# Patient Record
Sex: MALE | Race: OTHER | Employment: OTHER | URBAN - METROPOLITAN AREA
[De-identification: names, ages, dates, MRNs, and addresses within clinical notes are randomized per-mention and may not be internally consistent; named-entity substitution may affect disease eponyms.]

---

## 2017-01-03 ENCOUNTER — TELEPHONE (OUTPATIENT)
Dept: CARDIOLOGY CLINIC | Age: 62
End: 2017-01-03

## 2017-01-03 RX ORDER — BENZONATATE 100 MG/1
CAPSULE ORAL
Qty: 30 CAPSULE | Refills: 0 | Status: ON HOLD | OUTPATIENT
Start: 2017-01-03 | End: 2018-04-27 | Stop reason: CLARIF

## 2017-01-09 ENCOUNTER — TELEPHONE (OUTPATIENT)
Dept: CARDIOLOGY CLINIC | Age: 62
End: 2017-01-09

## 2017-01-17 ENCOUNTER — TELEPHONE (OUTPATIENT)
Dept: CARDIOLOGY CLINIC | Age: 62
End: 2017-01-17

## 2017-01-17 ENCOUNTER — TELEPHONE (OUTPATIENT)
Dept: CARDIAC REHAB | Age: 62
End: 2017-01-17

## 2017-01-23 ENCOUNTER — TELEPHONE (OUTPATIENT)
Dept: CARDIOLOGY CLINIC | Age: 62
End: 2017-01-23

## 2017-02-01 ENCOUNTER — OFFICE VISIT (OUTPATIENT)
Dept: CARDIOLOGY CLINIC | Age: 62
End: 2017-02-01

## 2017-02-01 VITALS
OXYGEN SATURATION: 95 % | HEART RATE: 62 BPM | SYSTOLIC BLOOD PRESSURE: 120 MMHG | BODY MASS INDEX: 45.48 KG/M2 | WEIGHT: 308 LBS | DIASTOLIC BLOOD PRESSURE: 54 MMHG

## 2017-02-01 DIAGNOSIS — Z79.4 TYPE 2 DIABETES MELLITUS WITH STAGE 3 CHRONIC KIDNEY DISEASE, WITH LONG-TERM CURRENT USE OF INSULIN (HCC): ICD-10-CM

## 2017-02-01 DIAGNOSIS — I50.32 CHRONIC DIASTOLIC HEART FAILURE (HCC): Primary | ICD-10-CM

## 2017-02-01 DIAGNOSIS — E11.22 TYPE 2 DIABETES MELLITUS WITH STAGE 3 CHRONIC KIDNEY DISEASE, WITH LONG-TERM CURRENT USE OF INSULIN (HCC): ICD-10-CM

## 2017-02-01 DIAGNOSIS — I25.10 CORONARY ARTERY DISEASE INVOLVING NATIVE CORONARY ARTERY OF NATIVE HEART WITHOUT ANGINA PECTORIS: ICD-10-CM

## 2017-02-01 DIAGNOSIS — E78.2 MIXED HYPERLIPIDEMIA: ICD-10-CM

## 2017-02-01 DIAGNOSIS — I15.9 SECONDARY HYPERTENSION: ICD-10-CM

## 2017-02-01 DIAGNOSIS — D63.8 ANEMIA OF CHRONIC DISEASE: ICD-10-CM

## 2017-02-01 DIAGNOSIS — I27.22 PULMONARY HYPERTENSION DUE TO LEFT VENTRICULAR DIASTOLIC DYSFUNCTION (HCC): ICD-10-CM

## 2017-02-01 DIAGNOSIS — J96.11 CHRONIC RESPIRATORY FAILURE WITH HYPOXIA (HCC): ICD-10-CM

## 2017-02-01 DIAGNOSIS — N18.30 TYPE 2 DIABETES MELLITUS WITH STAGE 3 CHRONIC KIDNEY DISEASE, WITH LONG-TERM CURRENT USE OF INSULIN (HCC): ICD-10-CM

## 2017-02-01 DIAGNOSIS — Z95.1 S/P CABG X 3: ICD-10-CM

## 2017-02-01 DIAGNOSIS — N18.3 CKD (CHRONIC KIDNEY DISEASE), STAGE 3 (MODERATE): ICD-10-CM

## 2017-02-01 PROCEDURE — 99214 OFFICE O/P EST MOD 30 MIN: CPT | Performed by: NURSE PRACTITIONER

## 2017-02-01 RX ORDER — METOLAZONE 5 MG/1
5 TABLET ORAL PRN
Qty: 15 TABLET | Refills: 3
Start: 2017-02-01 | End: 2017-02-15 | Stop reason: ALTCHOICE

## 2017-02-02 ENCOUNTER — TELEPHONE (OUTPATIENT)
Dept: CARDIOLOGY CLINIC | Age: 62
End: 2017-02-02

## 2017-02-03 ENCOUNTER — TELEPHONE (OUTPATIENT)
Dept: CARDIOLOGY CLINIC | Age: 62
End: 2017-02-03

## 2017-02-07 ENCOUNTER — TELEPHONE (OUTPATIENT)
Dept: CARDIOLOGY CLINIC | Age: 62
End: 2017-02-07

## 2017-02-15 ENCOUNTER — OFFICE VISIT (OUTPATIENT)
Dept: CARDIOLOGY CLINIC | Age: 62
End: 2017-02-15

## 2017-02-15 VITALS
BODY MASS INDEX: 45.42 KG/M2 | HEART RATE: 61 BPM | WEIGHT: 307.6 LBS | DIASTOLIC BLOOD PRESSURE: 56 MMHG | SYSTOLIC BLOOD PRESSURE: 124 MMHG | OXYGEN SATURATION: 95 %

## 2017-02-15 DIAGNOSIS — I10 ESSENTIAL HYPERTENSION: ICD-10-CM

## 2017-02-15 DIAGNOSIS — N18.30 CKD (CHRONIC KIDNEY DISEASE) STAGE 3, GFR 30-59 ML/MIN (HCC): ICD-10-CM

## 2017-02-15 DIAGNOSIS — I50.32 CHRONIC DIASTOLIC HEART FAILURE (HCC): Primary | ICD-10-CM

## 2017-02-15 DIAGNOSIS — I25.10 CORONARY ARTERY DISEASE INVOLVING NATIVE CORONARY ARTERY OF NATIVE HEART WITHOUT ANGINA PECTORIS: Chronic | ICD-10-CM

## 2017-02-15 PROCEDURE — 99214 OFFICE O/P EST MOD 30 MIN: CPT | Performed by: NURSE PRACTITIONER

## 2017-03-22 ENCOUNTER — OFFICE VISIT (OUTPATIENT)
Dept: ORTHOPEDIC SURGERY | Age: 62
End: 2017-03-22

## 2017-03-22 VITALS
DIASTOLIC BLOOD PRESSURE: 61 MMHG | BODY MASS INDEX: 45.55 KG/M2 | HEART RATE: 53 BPM | WEIGHT: 307.54 LBS | SYSTOLIC BLOOD PRESSURE: 149 MMHG | HEIGHT: 69 IN

## 2017-03-22 DIAGNOSIS — M48.062 LUMBAR STENOSIS WITH NEUROGENIC CLAUDICATION: Primary | ICD-10-CM

## 2017-03-22 PROCEDURE — 99213 OFFICE O/P EST LOW 20 MIN: CPT | Performed by: PHYSICAL MEDICINE & REHABILITATION

## 2017-03-22 RX ORDER — OXYCODONE HCL 10 MG/1
TABLET, FILM COATED, EXTENDED RELEASE ORAL
Qty: 60 TABLET | Refills: 0 | Status: SHIPPED | OUTPATIENT
Start: 2017-03-22 | End: 2017-05-05 | Stop reason: RX

## 2017-03-31 ENCOUNTER — OFFICE VISIT (OUTPATIENT)
Dept: ORTHOPEDIC SURGERY | Age: 62
End: 2017-03-31

## 2017-03-31 VITALS
WEIGHT: 299 LBS | HEART RATE: 78 BPM | BODY MASS INDEX: 44.28 KG/M2 | DIASTOLIC BLOOD PRESSURE: 84 MMHG | SYSTOLIC BLOOD PRESSURE: 132 MMHG | HEIGHT: 69 IN

## 2017-03-31 DIAGNOSIS — S83.242A TEAR OF MEDIAL MENISCUS OF LEFT KNEE, UNSPECIFIED TEAR TYPE, UNSPECIFIED WHETHER OLD OR CURRENT TEAR, INITIAL ENCOUNTER: ICD-10-CM

## 2017-03-31 DIAGNOSIS — M25.562 LEFT KNEE PAIN, UNSPECIFIED CHRONICITY: Primary | ICD-10-CM

## 2017-03-31 PROCEDURE — L1812 KO ELASTIC W/JOINTS PRE OTS: HCPCS | Performed by: PHYSICIAN ASSISTANT

## 2017-03-31 PROCEDURE — 99213 OFFICE O/P EST LOW 20 MIN: CPT | Performed by: PHYSICIAN ASSISTANT

## 2017-03-31 PROCEDURE — 73564 X-RAY EXAM KNEE 4 OR MORE: CPT | Performed by: PHYSICIAN ASSISTANT

## 2017-04-05 RX ORDER — OXYCODONE HYDROCHLORIDE AND ACETAMINOPHEN 5; 325 MG/1; MG/1
1 TABLET ORAL EVERY 6 HOURS PRN
Qty: 120 TABLET | Refills: 0 | Status: SHIPPED | OUTPATIENT
Start: 2017-04-05 | End: 2017-05-15

## 2017-05-01 ENCOUNTER — TELEPHONE (OUTPATIENT)
Dept: CARDIOLOGY CLINIC | Age: 62
End: 2017-05-01

## 2017-05-05 ENCOUNTER — OFFICE VISIT (OUTPATIENT)
Dept: CARDIOLOGY CLINIC | Age: 62
End: 2017-05-05

## 2017-05-05 ENCOUNTER — TELEPHONE (OUTPATIENT)
Dept: CARDIOLOGY | Age: 62
End: 2017-05-05

## 2017-05-05 VITALS
HEIGHT: 69 IN | WEIGHT: 303 LBS | SYSTOLIC BLOOD PRESSURE: 120 MMHG | DIASTOLIC BLOOD PRESSURE: 52 MMHG | BODY MASS INDEX: 44.88 KG/M2 | OXYGEN SATURATION: 95 % | HEART RATE: 56 BPM

## 2017-05-05 DIAGNOSIS — I10 ESSENTIAL HYPERTENSION: ICD-10-CM

## 2017-05-05 DIAGNOSIS — I25.10 CORONARY ARTERY DISEASE INVOLVING NATIVE CORONARY ARTERY OF NATIVE HEART WITHOUT ANGINA PECTORIS: ICD-10-CM

## 2017-05-05 DIAGNOSIS — I42.9 CARDIOMYOPATHY (HCC): ICD-10-CM

## 2017-05-05 DIAGNOSIS — N18.30 CKD (CHRONIC KIDNEY DISEASE) STAGE 3, GFR 30-59 ML/MIN (HCC): ICD-10-CM

## 2017-05-05 DIAGNOSIS — I50.32 CHRONIC DIASTOLIC HEART FAILURE (HCC): Primary | ICD-10-CM

## 2017-05-05 PROCEDURE — 99214 OFFICE O/P EST MOD 30 MIN: CPT | Performed by: NURSE PRACTITIONER

## 2017-05-09 ENCOUNTER — TELEPHONE (OUTPATIENT)
Dept: CARDIOLOGY CLINIC | Age: 62
End: 2017-05-09

## 2017-05-15 ENCOUNTER — OFFICE VISIT (OUTPATIENT)
Dept: ORTHOPEDIC SURGERY | Age: 62
End: 2017-05-15

## 2017-05-15 VITALS — HEIGHT: 69 IN | BODY MASS INDEX: 44.73 KG/M2 | WEIGHT: 302 LBS

## 2017-05-15 DIAGNOSIS — M48.061 LUMBAR STENOSIS: Primary | ICD-10-CM

## 2017-05-15 PROCEDURE — 99213 OFFICE O/P EST LOW 20 MIN: CPT | Performed by: PHYSICAL MEDICINE & REHABILITATION

## 2017-05-15 RX ORDER — FENTANYL 12 UG/H
1 PATCH TRANSDERMAL
Qty: 10 PATCH | Refills: 0 | Status: SHIPPED | OUTPATIENT
Start: 2017-05-15 | End: 2017-06-14

## 2017-06-12 ENCOUNTER — OFFICE VISIT (OUTPATIENT)
Dept: ORTHOPEDIC SURGERY | Age: 62
End: 2017-06-12

## 2017-06-12 VITALS
HEIGHT: 69 IN | WEIGHT: 302.03 LBS | DIASTOLIC BLOOD PRESSURE: 67 MMHG | HEART RATE: 60 BPM | BODY MASS INDEX: 44.73 KG/M2 | SYSTOLIC BLOOD PRESSURE: 146 MMHG

## 2017-06-12 DIAGNOSIS — M51.36 DDD (DEGENERATIVE DISC DISEASE), LUMBAR: Primary | ICD-10-CM

## 2017-06-12 DIAGNOSIS — M54.16 LUMBAR RADICULITIS: ICD-10-CM

## 2017-06-12 DIAGNOSIS — M48.061 LUMBAR STENOSIS: ICD-10-CM

## 2017-06-12 PROCEDURE — 99213 OFFICE O/P EST LOW 20 MIN: CPT | Performed by: PHYSICIAN ASSISTANT

## 2017-06-12 RX ORDER — OXYCODONE HYDROCHLORIDE AND ACETAMINOPHEN 5; 325 MG/1; MG/1
TABLET ORAL
Qty: 120 TABLET | Refills: 0 | Status: SHIPPED | OUTPATIENT
Start: 2017-08-07 | End: 2018-03-14 | Stop reason: SDUPTHER

## 2017-06-12 RX ORDER — OXYCODONE HYDROCHLORIDE AND ACETAMINOPHEN 5; 325 MG/1; MG/1
TABLET ORAL
Qty: 120 TABLET | Refills: 0 | Status: SHIPPED | OUTPATIENT
Start: 2017-06-12 | End: 2017-08-10 | Stop reason: SDUPTHER

## 2017-06-12 RX ORDER — OXYCODONE HYDROCHLORIDE AND ACETAMINOPHEN 5; 325 MG/1; MG/1
TABLET ORAL
Qty: 120 TABLET | Refills: 0 | Status: SHIPPED | OUTPATIENT
Start: 2017-07-10 | End: 2017-08-10 | Stop reason: SDUPTHER

## 2017-06-19 ENCOUNTER — TELEPHONE (OUTPATIENT)
Dept: CARDIOLOGY CLINIC | Age: 62
End: 2017-06-19

## 2017-07-01 ENCOUNTER — HOSPITAL ENCOUNTER (OUTPATIENT)
Dept: OTHER | Age: 62
Discharge: HOME | End: 2017-07-31
Attending: INTERNAL MEDICINE | Admitting: INTERNAL MEDICINE

## 2017-07-05 LAB
ANION GAP SERPL CALCULATED.3IONS-SCNC: 13 MMOL/L (ref 3–16)
BUN BLDV-MCNC: 69 MG/DL (ref 7–20)
CALCIUM SERPL-MCNC: 9.2 MG/DL (ref 8.3–10.6)
CHLORIDE BLD-SCNC: 92 MMOL/L (ref 99–110)
CO2: 28 MMOL/L (ref 21–32)
CREAT SERPL-MCNC: 1.5 MG/DL (ref 0.8–1.3)
GFR AFRICAN AMERICAN: 57
GFR NON-AFRICAN AMERICAN: 47
GLUCOSE BLD-MCNC: 178 MG/DL (ref 70–99)
POTASSIUM SERPL-SCNC: 4.1 MMOL/L (ref 3.5–5.1)
SODIUM BLD-SCNC: 133 MMOL/L (ref 136–145)

## 2017-07-13 ENCOUNTER — TELEPHONE (OUTPATIENT)
Dept: CARDIOLOGY CLINIC | Age: 62
End: 2017-07-13

## 2017-08-01 RX ORDER — ATORVASTATIN CALCIUM 40 MG/1
40 TABLET, FILM COATED ORAL DAILY
Qty: 90 TABLET | Refills: 3 | Status: SHIPPED | OUTPATIENT
Start: 2017-08-01 | End: 2021-04-20

## 2017-08-04 RX ORDER — ATORVASTATIN CALCIUM 40 MG/1
40 TABLET, FILM COATED ORAL DAILY
Qty: 90 TABLET | Refills: 3 | OUTPATIENT
Start: 2017-08-04

## 2017-08-10 ENCOUNTER — OFFICE VISIT (OUTPATIENT)
Dept: CARDIOLOGY CLINIC | Age: 62
End: 2017-08-10

## 2017-08-10 VITALS
DIASTOLIC BLOOD PRESSURE: 50 MMHG | SYSTOLIC BLOOD PRESSURE: 100 MMHG | WEIGHT: 315 LBS | BODY MASS INDEX: 47.74 KG/M2 | HEIGHT: 68 IN | HEART RATE: 60 BPM | OXYGEN SATURATION: 95 %

## 2017-08-10 DIAGNOSIS — I27.22 PULMONARY HYPERTENSION DUE TO LEFT VENTRICULAR DIASTOLIC DYSFUNCTION (HCC): ICD-10-CM

## 2017-08-10 DIAGNOSIS — I25.10 CORONARY ARTERY DISEASE INVOLVING NATIVE CORONARY ARTERY OF NATIVE HEART WITHOUT ANGINA PECTORIS: ICD-10-CM

## 2017-08-10 DIAGNOSIS — I50.32 CHRONIC DIASTOLIC HEART FAILURE (HCC): Primary | ICD-10-CM

## 2017-08-10 DIAGNOSIS — I10 ESSENTIAL HYPERTENSION: ICD-10-CM

## 2017-08-10 DIAGNOSIS — G47.19 EXCESSIVE DAYTIME SLEEPINESS: ICD-10-CM

## 2017-08-10 PROCEDURE — 99214 OFFICE O/P EST MOD 30 MIN: CPT | Performed by: NURSE PRACTITIONER

## 2017-08-10 RX ORDER — COLCHICINE 0.6 MG/1
0.6 TABLET ORAL DAILY
Status: ON HOLD | COMMUNITY
End: 2018-04-27 | Stop reason: CLARIF

## 2017-08-10 RX ORDER — ALLOPURINOL 100 MG/1
300 TABLET ORAL
Status: ON HOLD | COMMUNITY
End: 2022-03-14 | Stop reason: HOSPADM

## 2017-08-15 ENCOUNTER — TELEPHONE (OUTPATIENT)
Dept: CARDIOLOGY CLINIC | Age: 62
End: 2017-08-15

## 2017-08-22 ENCOUNTER — OFFICE VISIT (OUTPATIENT)
Dept: PULMONOLOGY | Age: 62
End: 2017-08-22

## 2017-08-22 VITALS
OXYGEN SATURATION: 92 % | SYSTOLIC BLOOD PRESSURE: 115 MMHG | DIASTOLIC BLOOD PRESSURE: 52 MMHG | HEIGHT: 68 IN | WEIGHT: 315 LBS | BODY MASS INDEX: 47.74 KG/M2 | HEART RATE: 64 BPM

## 2017-08-22 DIAGNOSIS — R06.83 SNORING: ICD-10-CM

## 2017-08-22 DIAGNOSIS — R53.83 FATIGUE, UNSPECIFIED TYPE: Primary | ICD-10-CM

## 2017-08-22 DIAGNOSIS — Z72.821 INADEQUATE SLEEP HYGIENE: ICD-10-CM

## 2017-08-22 PROCEDURE — 99214 OFFICE O/P EST MOD 30 MIN: CPT | Performed by: INTERNAL MEDICINE

## 2017-08-22 ASSESSMENT — SLEEP AND FATIGUE QUESTIONNAIRES
HOW LIKELY ARE YOU TO NOD OFF OR FALL ASLEEP WHILE SITTING QUIETLY AFTER LUNCH WITHOUT ALCOHOL: 3
HOW LIKELY ARE YOU TO NOD OFF OR FALL ASLEEP WHILE SITTING AND READING: 2
HOW LIKELY ARE YOU TO NOD OFF OR FALL ASLEEP WHEN YOU ARE A PASSENGER IN A CAR FOR AN HOUR WITHOUT A BREAK: 0
NECK CIRCUMFERENCE (INCHES): 20.5
HOW LIKELY ARE YOU TO NOD OFF OR FALL ASLEEP WHILE LYING DOWN TO REST IN THE AFTERNOON WHEN CIRCUMSTANCES PERMIT: 2
ESS TOTAL SCORE: 9
HOW LIKELY ARE YOU TO NOD OFF OR FALL ASLEEP WHILE SITTING AND TALKING TO SOMEONE: 0
HOW LIKELY ARE YOU TO NOD OFF OR FALL ASLEEP WHILE WATCHING TV: 2
HOW LIKELY ARE YOU TO NOD OFF OR FALL ASLEEP IN A CAR, WHILE STOPPED FOR A FEW MINUTES IN TRAFFIC: 0
HOW LIKELY ARE YOU TO NOD OFF OR FALL ASLEEP WHILE SITTING INACTIVE IN A PUBLIC PLACE: 0

## 2017-09-13 ENCOUNTER — OFFICE VISIT (OUTPATIENT)
Dept: ORTHOPEDIC SURGERY | Age: 62
End: 2017-09-13

## 2017-09-13 VITALS
HEART RATE: 62 BPM | DIASTOLIC BLOOD PRESSURE: 53 MMHG | SYSTOLIC BLOOD PRESSURE: 141 MMHG | BODY MASS INDEX: 47.74 KG/M2 | HEIGHT: 68 IN | WEIGHT: 315 LBS

## 2017-09-13 DIAGNOSIS — Z79.891 LONG TERM (CURRENT) USE OF OPIATE ANALGESIC: ICD-10-CM

## 2017-09-13 DIAGNOSIS — M51.36 DDD (DEGENERATIVE DISC DISEASE), LUMBAR: Primary | ICD-10-CM

## 2017-09-13 DIAGNOSIS — G89.29 OTHER CHRONIC PAIN: ICD-10-CM

## 2017-09-13 DIAGNOSIS — M48.061 LUMBAR STENOSIS: ICD-10-CM

## 2017-09-13 DIAGNOSIS — M54.16 LUMBAR RADICULITIS: ICD-10-CM

## 2017-09-13 PROCEDURE — 99213 OFFICE O/P EST LOW 20 MIN: CPT | Performed by: PHYSICIAN ASSISTANT

## 2017-09-13 RX ORDER — OXYCODONE HYDROCHLORIDE AND ACETAMINOPHEN 5; 325 MG/1; MG/1
TABLET ORAL
Qty: 120 TABLET | Refills: 0 | Status: SHIPPED | OUTPATIENT
Start: 2017-10-11 | End: 2017-12-13 | Stop reason: SDUPTHER

## 2017-09-13 RX ORDER — OXYCODONE HYDROCHLORIDE AND ACETAMINOPHEN 5; 325 MG/1; MG/1
TABLET ORAL
Qty: 120 TABLET | Refills: 0 | Status: SHIPPED | OUTPATIENT
Start: 2017-09-13 | End: 2017-12-13 | Stop reason: SDUPTHER

## 2017-09-13 RX ORDER — OXYCODONE HYDROCHLORIDE AND ACETAMINOPHEN 5; 325 MG/1; MG/1
TABLET ORAL
Qty: 120 TABLET | Refills: 0 | Status: SHIPPED | OUTPATIENT
Start: 2017-11-09 | End: 2017-12-13 | Stop reason: SDUPTHER

## 2017-09-28 ENCOUNTER — HOSPITAL ENCOUNTER (OUTPATIENT)
Dept: SLEEP MEDICINE | Age: 62
Discharge: HOME | End: 2017-09-28

## 2017-09-28 DIAGNOSIS — R53.83 FATIGUE, UNSPECIFIED TYPE: ICD-10-CM

## 2017-09-28 DIAGNOSIS — R06.83 SNORING: ICD-10-CM

## 2017-09-29 ENCOUNTER — HOSPITAL ENCOUNTER (OUTPATIENT)
Dept: OTHER | Age: 62
Discharge: HOME | End: 2017-09-30
Attending: INTERNAL MEDICINE | Admitting: INTERNAL MEDICINE

## 2017-10-02 ENCOUNTER — TELEPHONE (OUTPATIENT)
Dept: PULMONOLOGY | Age: 62
End: 2017-10-02

## 2017-10-02 DIAGNOSIS — G47.33 OSA (OBSTRUCTIVE SLEEP APNEA): Primary | ICD-10-CM

## 2017-11-16 ENCOUNTER — HOSPITAL ENCOUNTER (OUTPATIENT)
Dept: SLEEP MEDICINE | Age: 62
Discharge: HOME | End: 2017-11-18
Attending: INTERNAL MEDICINE | Admitting: INTERNAL MEDICINE

## 2017-11-16 DIAGNOSIS — G47.33 OSA (OBSTRUCTIVE SLEEP APNEA): ICD-10-CM

## 2017-11-20 DIAGNOSIS — G47.33 OSA (OBSTRUCTIVE SLEEP APNEA): Primary | ICD-10-CM

## 2017-12-13 ENCOUNTER — OFFICE VISIT (OUTPATIENT)
Dept: ORTHOPEDIC SURGERY | Age: 62
End: 2017-12-13

## 2017-12-13 VITALS
DIASTOLIC BLOOD PRESSURE: 55 MMHG | SYSTOLIC BLOOD PRESSURE: 123 MMHG | BODY MASS INDEX: 47.74 KG/M2 | WEIGHT: 315 LBS | HEIGHT: 68 IN | HEART RATE: 62 BPM

## 2017-12-13 DIAGNOSIS — M48.061 SPINAL STENOSIS OF LUMBAR REGION, UNSPECIFIED WHETHER NEUROGENIC CLAUDICATION PRESENT: ICD-10-CM

## 2017-12-13 DIAGNOSIS — M54.16 LUMBAR RADICULITIS: ICD-10-CM

## 2017-12-13 DIAGNOSIS — Z79.891 LONG TERM (CURRENT) USE OF OPIATE ANALGESIC: ICD-10-CM

## 2017-12-13 DIAGNOSIS — G89.29 OTHER CHRONIC PAIN: ICD-10-CM

## 2017-12-13 DIAGNOSIS — M51.36 DDD (DEGENERATIVE DISC DISEASE), LUMBAR: ICD-10-CM

## 2017-12-13 PROCEDURE — G8598 ASA/ANTIPLAT THER USED: HCPCS | Performed by: PHYSICIAN ASSISTANT

## 2017-12-13 PROCEDURE — G8427 DOCREV CUR MEDS BY ELIG CLIN: HCPCS | Performed by: PHYSICIAN ASSISTANT

## 2017-12-13 PROCEDURE — 1036F TOBACCO NON-USER: CPT | Performed by: PHYSICIAN ASSISTANT

## 2017-12-13 PROCEDURE — 99214 OFFICE O/P EST MOD 30 MIN: CPT | Performed by: PHYSICIAN ASSISTANT

## 2017-12-13 PROCEDURE — 3017F COLORECTAL CA SCREEN DOC REV: CPT | Performed by: PHYSICIAN ASSISTANT

## 2017-12-13 PROCEDURE — G8484 FLU IMMUNIZE NO ADMIN: HCPCS | Performed by: PHYSICIAN ASSISTANT

## 2017-12-13 PROCEDURE — G8417 CALC BMI ABV UP PARAM F/U: HCPCS | Performed by: PHYSICIAN ASSISTANT

## 2017-12-13 RX ORDER — OXYCODONE HYDROCHLORIDE AND ACETAMINOPHEN 5; 325 MG/1; MG/1
TABLET ORAL
Qty: 120 TABLET | Refills: 0 | Status: SHIPPED | OUTPATIENT
Start: 2018-02-09 | End: 2018-02-05 | Stop reason: SDUPTHER

## 2017-12-13 RX ORDER — OXYCODONE HYDROCHLORIDE AND ACETAMINOPHEN 5; 325 MG/1; MG/1
TABLET ORAL
Qty: 120 TABLET | Refills: 0 | Status: SHIPPED | OUTPATIENT
Start: 2017-12-13 | End: 2018-02-05 | Stop reason: SDUPTHER

## 2017-12-13 RX ORDER — OXYCODONE HYDROCHLORIDE AND ACETAMINOPHEN 5; 325 MG/1; MG/1
TABLET ORAL
Qty: 120 TABLET | Refills: 0 | Status: SHIPPED | OUTPATIENT
Start: 2018-01-11 | End: 2018-02-05 | Stop reason: SDUPTHER

## 2017-12-13 NOTE — PROGRESS NOTES
identified? NO  · Potential aberrant behavior identified? NO  · Reports loss are stolen prescriptions? NO  · Insist on certain medications by name? NO  · Purposeful oversedation? NO  · Increased dose without authorization? NO  · MED: 30  · Last UDS: , as expected    Side Effects: Denies    Past Medical History: Medical history form was reviewd today & scanned into the Media tab  Past Medical History:   Diagnosis Date    Anemia     Angina     Arthritis     CAD (coronary artery disease)     CHF (congestive heart failure) (HCC)     CKD (chronic kidney disease) stage 3, GFR 30-59 ml/min     Clostridium difficile diarrhea 3/16/12; 2/29/12    positive stool toxin    Diabetes mellitus ([facility] [address]     Diabetic neuropathy (HCC)     feet and legs    Disease of blood and blood forming organ     GERD (gastroesophageal reflux disease)     gastric ulcer    Hyperlipidemia     Hypertension     Kidney stone 2002    Refusal of blood transfusions as patient is Yazidism     Venous ulcer     LLE    Wound, open 1/13/2012        REVIEW OF SYSTEMS:   CONSTITUTIONAL: Denies unexplained weight loss, fevers, chills or fatigue  NEUROLOGIC: Denies tremors or seizures         PHYSICAL EXAM:    Vitals: Blood pressure (!) 123/55, pulse 62, height 5' 8.11\" (1.73 m), weight (!) 326 lb 1 oz (147.9 kg). GENERAL EXAM:  · General Apparence: Obesity. Patient is adequately groomed with no evidence of malnutrition. · Orientation: The patient is oriented to time, place and person. · Mood & Affect:The patient's mood and affect are appropriate   · Sensation: Sensation is intact without deficit  LUMBAR/SACRAL EXAMINATION:  · Inspection: Local inspection shows no step-off or bruising. Lumbar alignment is normal.  Sagittal and Coronal balance is neutral.      · Palpation:   No evidence of tenderness at the midline. No tenderness bilaterally at the paraspinal or trochanters. There is no step-off or paraspinal spasm.    · Range of Motion:  Able to sit forward flex without pain   · Strength:   Strength testing is 5/5 in all muscle groups tested. · Special Tests:   Straight leg raise and crossed SLR negative. Leg length and pelvis level.  0 out of 5 Poppy's signs. · Reflexes: Reflexes are symmetrically trace at the patellar and ankle tendons. Clonus absent bilaterally at the feet. · Gait & station: Forward flexed with cane     · Additional Examinations:   · RIGHT LOWER EXTREMITY: Inspection/examination of the right lower extremity does not show any tenderness, deformity or injury. Range of motion is full. There is no gross instability. There are no rashes, ulcerations or lesions. Strength and tone are normal.  · LEFT LOWER EXTREMITY:  Inspection/examination of the left lower extremity does not show any tenderness, deformity or injury. Range of motion is full. There is no gross instability. There are no rashes, ulcerations or lesions. Strength and tone are normal.    Diagnostic Testing:   UDS 12/13/2017 + OXY as expected    UDS 12/21/16 +OXY      Updated lumbar MRI scan and report reviewed from April 18, 2016, T12 Schmorl's node deformity with mild edema suggesting acute component, interval increase in now moderate L2-3 central stenosis; otherwise stable multilevel foraminal narrowing.  Stable remote L3 compression fracture      2 views left hip 1/22/2015 show moderate hip OA without definitive fracture      Lumbar radiographs 10-13-14: slightly worsening L3 compression fracture, lumbar DDD            Impression:  1) Chronic mechanical back pain, left radiculitis, moderate L2-3 central stenosis-stable   2) Left leg weakness, multi-factorial  3) Chronic knee/hip pain from OA, Dr. Adela Sales   4) h/o CHF, CKD, CHF, CAD, DM, peripheral neuropathy   5) Sx consult, Dr. Bari Ospina sx   6) medication maintenance with compliance, low risk per ORT  7) Previous ESIs & RFN w/out lasting benefit         Plan:    1) UDS today +OXY as expected   2) Percocet 5/325 I po QID PRN #120 x 3 scripts for chronic pain  3) F/u 3mo    The risks and use of maintenance opiate medication were reviewed. These include the risk of tolerance, addition, and abuse. Potential side effects were also discussed. Medications are to be taken as prescribed and not escalated without prior agreement. GREG/DIONNE reviewed & appropriate.    Opiate medications will only be prescribed through the office of BRANNON Cabrales unless notified otherwise             [facility]

## 2018-01-24 ENCOUNTER — TELEPHONE (OUTPATIENT)
Dept: PULMONOLOGY | Age: 63
End: 2018-01-24

## 2018-02-05 ENCOUNTER — OFFICE VISIT (OUTPATIENT)
Dept: CARDIOLOGY CLINIC | Age: 63
End: 2018-02-05

## 2018-02-05 VITALS
DIASTOLIC BLOOD PRESSURE: 80 MMHG | BODY MASS INDEX: 47.74 KG/M2 | OXYGEN SATURATION: 94 % | SYSTOLIC BLOOD PRESSURE: 132 MMHG | WEIGHT: 315 LBS | HEART RATE: 64 BPM | HEIGHT: 68 IN

## 2018-02-05 DIAGNOSIS — I50.32 CHRONIC DIASTOLIC HEART FAILURE (HCC): Primary | ICD-10-CM

## 2018-02-05 DIAGNOSIS — D63.8 ANEMIA OF CHRONIC DISEASE: ICD-10-CM

## 2018-02-05 DIAGNOSIS — I25.10 CORONARY ARTERY DISEASE INVOLVING NATIVE CORONARY ARTERY OF NATIVE HEART WITHOUT ANGINA PECTORIS: ICD-10-CM

## 2018-02-05 DIAGNOSIS — I10 ESSENTIAL HYPERTENSION: ICD-10-CM

## 2018-02-05 PROCEDURE — 3017F COLORECTAL CA SCREEN DOC REV: CPT | Performed by: NURSE PRACTITIONER

## 2018-02-05 PROCEDURE — G8484 FLU IMMUNIZE NO ADMIN: HCPCS | Performed by: NURSE PRACTITIONER

## 2018-02-05 PROCEDURE — 99213 OFFICE O/P EST LOW 20 MIN: CPT | Performed by: NURSE PRACTITIONER

## 2018-02-05 PROCEDURE — G8427 DOCREV CUR MEDS BY ELIG CLIN: HCPCS | Performed by: NURSE PRACTITIONER

## 2018-02-05 PROCEDURE — 1036F TOBACCO NON-USER: CPT | Performed by: NURSE PRACTITIONER

## 2018-02-05 PROCEDURE — G8417 CALC BMI ABV UP PARAM F/U: HCPCS | Performed by: NURSE PRACTITIONER

## 2018-02-05 PROCEDURE — G8599 NO ASA/ANTIPLAT THER USE RNG: HCPCS | Performed by: NURSE PRACTITIONER

## 2018-02-05 NOTE — PATIENT INSTRUCTIONS
Plan:     1. No changes to medications today  2. Follow up with nephrology as planned with adjustments in torsemide for weight gain  3. Continue daily weights  4.  Follow up with Dr. Mahendra Reeves in 6 months

## 2018-02-05 NOTE — PROGRESS NOTES
impulses not displaced  · no murmur/rub/gallop  · Regular rate and rhythm, S1,S2 distant  · Radial pulses 2+ and equal bilaterally  · +1  BLE edema  · Pedal Pulses: 2+ and equal   Abdomen: obese  · No masses or tenderness  · Liver: No Abnormalities Noted  Musculoskeletal/Skin:  · Walks with cane  · There is no clubbing, cyanosis of the extremities  · Skin is warm and dry  · Moves all extremities well  Neurological/Psychiatric:  · Alert and oriented in all spheres  · No abnormalities of mood, affect, memory, mentation, or behavior are noted    Lab Data:  CBC:   Lab Results   Component Value Date    WBC 8.0 01/23/2017    WBC 6.4 01/11/2017    WBC 7.7 01/10/2017    RBC 3.86 01/23/2017    RBC 3.83 01/11/2017    RBC 3.86 01/10/2017    HGB 10.2 01/23/2017    HGB 10.4 01/11/2017    HGB 10.5 01/10/2017    HCT 31.6 01/23/2017    HCT 33.1 01/11/2017    HCT 33.3 01/10/2017    MCV 81.9 01/23/2017    MCV 86.5 01/11/2017    MCV 86.3 01/10/2017    RDW 17.6 01/23/2017    RDW 18.4 01/11/2017    RDW 18.5 01/10/2017     01/23/2017     01/11/2017     01/10/2017     BMP:   Lab Results   Component Value Date     08/31/2017     08/15/2017     08/01/2017    K 4.1 08/31/2017    K 3.9 08/15/2017    K 4.1 08/01/2017    CL 97 08/31/2017    CL 96 08/15/2017    CL 94 08/01/2017    CO2 28 08/31/2017    CO2 30 08/15/2017    CO2 26 08/01/2017    PHOS 3.8 01/15/2017    PHOS 3.1 01/14/2017    PHOS 3.7 12/12/2016    BUN 50 08/31/2017    BUN 56 08/15/2017    BUN 45 08/01/2017    CREATININE 1.5 08/31/2017    CREATININE 1.46 08/15/2017    CREATININE 1.4 08/01/2017     BNP:   Lab Results   Component Value Date    PROBNP 688 02/27/2017    PROBNP 935 01/30/2017    PROBNP 1,306 01/27/2017       Recent Testing:  Echo 9/2016  Left ventricular systolic function is normal with ejection fraction   estimated at 55 %. Diastolic septal flattening consistent with right ventricular overload.    Diastolic filling parameters

## 2018-02-20 ENCOUNTER — TELEPHONE (OUTPATIENT)
Dept: PULMONOLOGY | Age: 63
End: 2018-02-20

## 2018-03-07 ENCOUNTER — TELEPHONE (OUTPATIENT)
Dept: PULMONOLOGY | Age: 63
End: 2018-03-07

## 2018-03-14 ENCOUNTER — OFFICE VISIT (OUTPATIENT)
Dept: ORTHOPEDIC SURGERY | Age: 63
End: 2018-03-14

## 2018-03-14 VITALS
WEIGHT: 315 LBS | SYSTOLIC BLOOD PRESSURE: 120 MMHG | DIASTOLIC BLOOD PRESSURE: 66 MMHG | HEIGHT: 68 IN | HEART RATE: 64 BPM | BODY MASS INDEX: 47.74 KG/M2

## 2018-03-14 DIAGNOSIS — M48.062 SPINAL STENOSIS OF LUMBAR REGION WITH NEUROGENIC CLAUDICATION: ICD-10-CM

## 2018-03-14 DIAGNOSIS — M54.16 LUMBAR RADICULITIS: ICD-10-CM

## 2018-03-14 DIAGNOSIS — M51.36 DDD (DEGENERATIVE DISC DISEASE), LUMBAR: ICD-10-CM

## 2018-03-14 PROCEDURE — G8599 NO ASA/ANTIPLAT THER USE RNG: HCPCS | Performed by: PHYSICAL MEDICINE & REHABILITATION

## 2018-03-14 PROCEDURE — 3017F COLORECTAL CA SCREEN DOC REV: CPT | Performed by: PHYSICAL MEDICINE & REHABILITATION

## 2018-03-14 PROCEDURE — 1036F TOBACCO NON-USER: CPT | Performed by: PHYSICAL MEDICINE & REHABILITATION

## 2018-03-14 PROCEDURE — G8427 DOCREV CUR MEDS BY ELIG CLIN: HCPCS | Performed by: PHYSICAL MEDICINE & REHABILITATION

## 2018-03-14 PROCEDURE — 99213 OFFICE O/P EST LOW 20 MIN: CPT | Performed by: PHYSICAL MEDICINE & REHABILITATION

## 2018-03-14 PROCEDURE — G8417 CALC BMI ABV UP PARAM F/U: HCPCS | Performed by: PHYSICAL MEDICINE & REHABILITATION

## 2018-03-14 PROCEDURE — G8484 FLU IMMUNIZE NO ADMIN: HCPCS | Performed by: PHYSICAL MEDICINE & REHABILITATION

## 2018-03-14 RX ORDER — OXYCODONE HYDROCHLORIDE AND ACETAMINOPHEN 5; 325 MG/1; MG/1
TABLET ORAL
Qty: 120 TABLET | Refills: 0 | Status: ON HOLD | OUTPATIENT
Start: 2018-05-10 | End: 2018-05-01 | Stop reason: HOSPADM

## 2018-03-14 RX ORDER — OXYCODONE HYDROCHLORIDE AND ACETAMINOPHEN 5; 325 MG/1; MG/1
TABLET ORAL
Qty: 120 TABLET | Refills: 0 | Status: SHIPPED | OUTPATIENT
Start: 2018-04-12 | End: 2018-06-04 | Stop reason: SDUPTHER

## 2018-03-14 RX ORDER — OXYCODONE HYDROCHLORIDE AND ACETAMINOPHEN 5; 325 MG/1; MG/1
TABLET ORAL
Qty: 120 TABLET | Refills: 0 | Status: SHIPPED | OUTPATIENT
Start: 2018-03-14 | End: 2018-06-04 | Stop reason: SDUPTHER

## 2018-03-14 NOTE — PROGRESS NOTES
paraspinal spasm. · Range of Motion:  Moderate loss of flexion extension   · Strength:   Strength testing is 5/5 in all muscle groups tested. · Special Tests:   Straight leg raise and crossed SLR negative. Leg length and pelvis level.  0 out of 5 Poppy's signs. · Skin: There are no rashes, ulcerations or lesions. · Reflexes: Reflexes are symmetrically trace at the patellar and ankle tendons. Clonus absent bilaterally at the feet. · Gait & station: Normal gait   · Additional Examinations:   · RIGHT LOWER EXTREMITY: Inspection/examination of the right lower extremity does not show any tenderness, deformity or injury. Range of motion is full. There is no gross instability. There are no rashes, ulcerations or lesions. Strength and tone are normal.  · LEFT LOWER EXTREMITY:  Inspection/examination of the left lower extremity does not show any tenderness, deformity or injury. Range of motion is full. There is no gross instability. There are no rashes, ulcerations or lesions. Strength and tone are normal.    Diagnostic Testing:     UDS 12/13/2017 + OXY as expected     UDS 12/21/16 +OXY      Updated lumbar MRI scan and report reviewed today from April 18, 2016, T12 Schmorl's node deformity with mild edema suggesting acute component, interval increase in now moderate L2-3 central stenosis; otherwise stable multilevel foraminal narrowing. Stable remote L3 compression fracture      2 views left hip 1/22/2015 show moderate hip OA without definitive fracture      Lumbar radiographs 10-13-14: slightly worsening L3 compression fracture, lumbar DDD    Impression:    Lumbar stenosis  Opioid maintenance with compliance,low risk, ORT=3  History of obesity CHF chronic kidney disease coronary artery disease peripheral neuropathy diabetes      Plan:      Controlled Substances Monitoring: The Prescription Monitoring Report for this patient was reviewed today.  (Liliam Poe MD)    Possible medication side

## 2018-04-03 ENCOUNTER — OFFICE VISIT (OUTPATIENT)
Dept: PULMONOLOGY | Age: 63
End: 2018-04-03

## 2018-04-03 VITALS
SYSTOLIC BLOOD PRESSURE: 128 MMHG | HEART RATE: 66 BPM | OXYGEN SATURATION: 93 % | HEIGHT: 68 IN | WEIGHT: 315 LBS | BODY MASS INDEX: 47.74 KG/M2 | RESPIRATION RATE: 16 BRPM | TEMPERATURE: 98.4 F | DIASTOLIC BLOOD PRESSURE: 61 MMHG

## 2018-04-03 DIAGNOSIS — G47.10 HYPERSOMNIA: ICD-10-CM

## 2018-04-03 DIAGNOSIS — G47.33 OSA (OBSTRUCTIVE SLEEP APNEA): Primary | ICD-10-CM

## 2018-04-03 DIAGNOSIS — G47.01 INSOMNIA DUE TO MEDICAL CONDITION: ICD-10-CM

## 2018-04-03 DIAGNOSIS — E66.01 OBESITY, CLASS III, BMI 40-49.9 (MORBID OBESITY) (HCC): ICD-10-CM

## 2018-04-03 DIAGNOSIS — Z72.821 POOR SLEEP HYGIENE: ICD-10-CM

## 2018-04-03 PROCEDURE — G8427 DOCREV CUR MEDS BY ELIG CLIN: HCPCS | Performed by: NURSE PRACTITIONER

## 2018-04-03 PROCEDURE — G8599 NO ASA/ANTIPLAT THER USE RNG: HCPCS | Performed by: NURSE PRACTITIONER

## 2018-04-03 PROCEDURE — 99214 OFFICE O/P EST MOD 30 MIN: CPT | Performed by: NURSE PRACTITIONER

## 2018-04-03 PROCEDURE — 1036F TOBACCO NON-USER: CPT | Performed by: NURSE PRACTITIONER

## 2018-04-03 PROCEDURE — G8417 CALC BMI ABV UP PARAM F/U: HCPCS | Performed by: NURSE PRACTITIONER

## 2018-04-03 PROCEDURE — 3017F COLORECTAL CA SCREEN DOC REV: CPT | Performed by: NURSE PRACTITIONER

## 2018-04-03 ASSESSMENT — SLEEP AND FATIGUE QUESTIONNAIRES
HOW LIKELY ARE YOU TO NOD OFF OR FALL ASLEEP WHILE WATCHING TV: 3
HOW LIKELY ARE YOU TO NOD OFF OR FALL ASLEEP IN A CAR, WHILE STOPPED FOR A FEW MINUTES IN TRAFFIC: 0
HOW LIKELY ARE YOU TO NOD OFF OR FALL ASLEEP WHILE SITTING QUIETLY AFTER LUNCH WITHOUT ALCOHOL: 3
HOW LIKELY ARE YOU TO NOD OFF OR FALL ASLEEP WHILE SITTING AND READING: 3
HOW LIKELY ARE YOU TO NOD OFF OR FALL ASLEEP WHILE SITTING AND TALKING TO SOMEONE: 0
ESS TOTAL SCORE: 15
HOW LIKELY ARE YOU TO NOD OFF OR FALL ASLEEP WHILE LYING DOWN TO REST IN THE AFTERNOON WHEN CIRCUMSTANCES PERMIT: 3
NECK CIRCUMFERENCE (INCHES): 20.5
HOW LIKELY ARE YOU TO NOD OFF OR FALL ASLEEP WHEN YOU ARE A PASSENGER IN A CAR FOR AN HOUR WITHOUT A BREAK: 3
HOW LIKELY ARE YOU TO NOD OFF OR FALL ASLEEP WHILE SITTING INACTIVE IN A PUBLIC PLACE: 0

## 2018-04-24 PROBLEM — I50.31 ACUTE DIASTOLIC CONGESTIVE HEART FAILURE (HCC): Status: ACTIVE | Noted: 2018-04-24

## 2018-05-02 ENCOUNTER — TELEPHONE (OUTPATIENT)
Dept: PULMONOLOGY | Age: 63
End: 2018-05-02

## 2018-05-03 ENCOUNTER — TELEPHONE (OUTPATIENT)
Dept: CARDIAC REHAB | Age: 63
End: 2018-05-03

## 2018-05-17 ENCOUNTER — OFFICE VISIT (OUTPATIENT)
Dept: PULMONOLOGY | Age: 63
End: 2018-05-17

## 2018-05-17 VITALS
WEIGHT: 314 LBS | HEIGHT: 68 IN | TEMPERATURE: 97.3 F | BODY MASS INDEX: 47.59 KG/M2 | RESPIRATION RATE: 16 BRPM | OXYGEN SATURATION: 94 % | DIASTOLIC BLOOD PRESSURE: 59 MMHG | HEART RATE: 71 BPM | SYSTOLIC BLOOD PRESSURE: 128 MMHG

## 2018-05-17 DIAGNOSIS — G47.33 OSA (OBSTRUCTIVE SLEEP APNEA): Primary | ICD-10-CM

## 2018-05-17 DIAGNOSIS — Z72.821 POOR SLEEP HYGIENE: ICD-10-CM

## 2018-05-17 DIAGNOSIS — G47.10 HYPERSOMNIA: ICD-10-CM

## 2018-05-17 DIAGNOSIS — Z71.89 ENCOUNTER FOR BIPAP USE COUNSELING: ICD-10-CM

## 2018-05-17 DIAGNOSIS — E66.01 OBESITY, CLASS III, BMI 40-49.9 (MORBID OBESITY) (HCC): ICD-10-CM

## 2018-05-17 PROCEDURE — 1111F DSCHRG MED/CURRENT MED MERGE: CPT | Performed by: NURSE PRACTITIONER

## 2018-05-17 PROCEDURE — G8427 DOCREV CUR MEDS BY ELIG CLIN: HCPCS | Performed by: NURSE PRACTITIONER

## 2018-05-17 PROCEDURE — G8417 CALC BMI ABV UP PARAM F/U: HCPCS | Performed by: NURSE PRACTITIONER

## 2018-05-17 PROCEDURE — 3017F COLORECTAL CA SCREEN DOC REV: CPT | Performed by: NURSE PRACTITIONER

## 2018-05-17 PROCEDURE — 1036F TOBACCO NON-USER: CPT | Performed by: NURSE PRACTITIONER

## 2018-05-17 PROCEDURE — 99214 OFFICE O/P EST MOD 30 MIN: CPT | Performed by: NURSE PRACTITIONER

## 2018-05-17 PROCEDURE — G8598 ASA/ANTIPLAT THER USED: HCPCS | Performed by: NURSE PRACTITIONER

## 2018-05-17 ASSESSMENT — SLEEP AND FATIGUE QUESTIONNAIRES
HOW LIKELY ARE YOU TO NOD OFF OR FALL ASLEEP IN A CAR, WHILE STOPPED FOR A FEW MINUTES IN TRAFFIC: 0
HOW LIKELY ARE YOU TO NOD OFF OR FALL ASLEEP WHILE SITTING INACTIVE IN A PUBLIC PLACE: 2
HOW LIKELY ARE YOU TO NOD OFF OR FALL ASLEEP WHEN YOU ARE A PASSENGER IN A CAR FOR AN HOUR WITHOUT A BREAK: 0
HOW LIKELY ARE YOU TO NOD OFF OR FALL ASLEEP WHILE SITTING AND READING: 2
HOW LIKELY ARE YOU TO NOD OFF OR FALL ASLEEP WHILE LYING DOWN TO REST IN THE AFTERNOON WHEN CIRCUMSTANCES PERMIT: 3
HOW LIKELY ARE YOU TO NOD OFF OR FALL ASLEEP WHILE WATCHING TV: 2
ESS TOTAL SCORE: 11
HOW LIKELY ARE YOU TO NOD OFF OR FALL ASLEEP WHILE SITTING AND TALKING TO SOMEONE: 0
NECK CIRCUMFERENCE (INCHES): 20.25
HOW LIKELY ARE YOU TO NOD OFF OR FALL ASLEEP WHILE SITTING QUIETLY AFTER LUNCH WITHOUT ALCOHOL: 2

## 2018-05-22 ENCOUNTER — HOSPITAL ENCOUNTER (OUTPATIENT)
Dept: OTHER | Age: 63
Discharge: HOME | End: 2018-05-31
Attending: FAMILY MEDICINE | Admitting: FAMILY MEDICINE

## 2018-05-24 ENCOUNTER — TELEPHONE (OUTPATIENT)
Dept: ORTHOPEDIC SURGERY | Age: 63
End: 2018-05-24

## 2018-06-01 ENCOUNTER — HOSPITAL ENCOUNTER (OUTPATIENT)
Dept: OTHER | Age: 63
Discharge: HOME | End: 2018-06-30
Attending: FAMILY MEDICINE | Admitting: FAMILY MEDICINE

## 2018-06-04 ENCOUNTER — TELEPHONE (OUTPATIENT)
Dept: ORTHOPEDIC SURGERY | Age: 63
End: 2018-06-04

## 2018-06-08 ENCOUNTER — OFFICE VISIT (OUTPATIENT)
Dept: ORTHOPEDIC SURGERY | Age: 63
End: 2018-06-08

## 2018-06-08 VITALS
DIASTOLIC BLOOD PRESSURE: 64 MMHG | SYSTOLIC BLOOD PRESSURE: 119 MMHG | BODY MASS INDEX: 47.58 KG/M2 | HEIGHT: 68 IN | WEIGHT: 313.94 LBS | HEART RATE: 65 BPM

## 2018-06-08 DIAGNOSIS — M48.061 SPINAL STENOSIS OF LUMBAR REGION, UNSPECIFIED WHETHER NEUROGENIC CLAUDICATION PRESENT: ICD-10-CM

## 2018-06-08 DIAGNOSIS — M51.36 DDD (DEGENERATIVE DISC DISEASE), LUMBAR: ICD-10-CM

## 2018-06-08 DIAGNOSIS — Z79.891 LONG TERM (CURRENT) USE OF OPIATE ANALGESIC: Primary | ICD-10-CM

## 2018-06-08 DIAGNOSIS — M48.062 SPINAL STENOSIS OF LUMBAR REGION WITH NEUROGENIC CLAUDICATION: ICD-10-CM

## 2018-06-08 DIAGNOSIS — G89.29 OTHER CHRONIC PAIN: ICD-10-CM

## 2018-06-08 DIAGNOSIS — M54.16 LUMBAR RADICULITIS: ICD-10-CM

## 2018-06-08 PROCEDURE — G8417 CALC BMI ABV UP PARAM F/U: HCPCS | Performed by: PHYSICAL MEDICINE & REHABILITATION

## 2018-06-08 PROCEDURE — 99214 OFFICE O/P EST MOD 30 MIN: CPT | Performed by: PHYSICAL MEDICINE & REHABILITATION

## 2018-06-08 PROCEDURE — G8427 DOCREV CUR MEDS BY ELIG CLIN: HCPCS | Performed by: PHYSICAL MEDICINE & REHABILITATION

## 2018-06-08 PROCEDURE — 1036F TOBACCO NON-USER: CPT | Performed by: PHYSICAL MEDICINE & REHABILITATION

## 2018-06-08 PROCEDURE — 3017F COLORECTAL CA SCREEN DOC REV: CPT | Performed by: PHYSICAL MEDICINE & REHABILITATION

## 2018-06-08 PROCEDURE — G8598 ASA/ANTIPLAT THER USED: HCPCS | Performed by: PHYSICAL MEDICINE & REHABILITATION

## 2018-06-08 RX ORDER — OXYCODONE HYDROCHLORIDE AND ACETAMINOPHEN 5; 325 MG/1; MG/1
1 TABLET ORAL 4 TIMES DAILY PRN
Qty: 120 TABLET | Refills: 0 | Status: SHIPPED | OUTPATIENT
Start: 2018-07-14 | End: 2018-08-13

## 2018-06-08 RX ORDER — OXYCODONE HYDROCHLORIDE AND ACETAMINOPHEN 5; 325 MG/1; MG/1
1 TABLET ORAL 4 TIMES DAILY PRN
Qty: 120 TABLET | Refills: 0 | Status: SHIPPED | OUTPATIENT
Start: 2018-08-14 | End: 2018-09-13

## 2018-06-08 RX ORDER — OXYCODONE HYDROCHLORIDE AND ACETAMINOPHEN 5; 325 MG/1; MG/1
1 TABLET ORAL 4 TIMES DAILY PRN
Qty: 120 TABLET | Refills: 0 | Status: SHIPPED | OUTPATIENT
Start: 2018-06-14 | End: 2018-07-14

## 2018-06-09 ENCOUNTER — HOSPITAL ENCOUNTER (OUTPATIENT)
Dept: OTHER | Age: 63
Discharge: HOME | End: 2018-06-30

## 2018-06-09 LAB
ANION GAP SERPL CALCULATED.3IONS-SCNC: 17 MMOL/L (ref 3–16)
BUN BLDV-MCNC: 121 MG/DL (ref 7–20)
CALCIUM SERPL-MCNC: 9.4 MG/DL (ref 8.3–10.6)
CHLORIDE BLD-SCNC: 86 MMOL/L (ref 99–110)
CO2: 31 MMOL/L (ref 21–32)
CREAT SERPL-MCNC: 1.4 MG/DL (ref 0.8–1.3)
GFR AFRICAN AMERICAN: >60
GFR NON-AFRICAN AMERICAN: 51
GLUCOSE BLD-MCNC: 254 MG/DL (ref 70–99)
POTASSIUM SERPL-SCNC: 3 MMOL/L (ref 3.5–5.1)
SODIUM BLD-SCNC: 134 MMOL/L (ref 136–145)

## 2018-06-18 LAB
A/G RATIO: 1.6 (ref 1.1–2.2)
ALBUMIN SERPL-MCNC: 4.3 G/DL (ref 3.4–5)
ALP BLD-CCNC: 96 U/L (ref 40–129)
ALT SERPL-CCNC: 30 U/L (ref 10–40)
ANION GAP SERPL CALCULATED.3IONS-SCNC: 13 MMOL/L (ref 3–16)
AST SERPL-CCNC: 31 U/L (ref 15–37)
BILIRUB SERPL-MCNC: 0.4 MG/DL (ref 0–1)
BUN BLDV-MCNC: 83 MG/DL (ref 7–20)
CALCIUM SERPL-MCNC: 9.5 MG/DL (ref 8.3–10.6)
CHLORIDE BLD-SCNC: 95 MMOL/L (ref 99–110)
CO2: 31 MMOL/L (ref 21–32)
CREAT SERPL-MCNC: 1.3 MG/DL (ref 0.8–1.3)
GFR AFRICAN AMERICAN: >60
GFR NON-AFRICAN AMERICAN: 56
GLOBULIN: 2.7 G/DL
GLUCOSE BLD-MCNC: 257 MG/DL (ref 70–99)
POTASSIUM SERPL-SCNC: 4.1 MMOL/L (ref 3.5–5.1)
SODIUM BLD-SCNC: 139 MMOL/L (ref 136–145)
TOTAL PROTEIN: 7 G/DL (ref 6.4–8.2)

## 2018-07-01 ENCOUNTER — HOSPITAL ENCOUNTER (OUTPATIENT)
Dept: OTHER | Age: 63
Discharge: HOME | End: 2018-07-01
Attending: INTERNAL MEDICINE | Admitting: INTERNAL MEDICINE

## 2018-07-03 ENCOUNTER — TELEPHONE (OUTPATIENT)
Dept: PULMONOLOGY | Age: 63
End: 2018-07-03

## 2018-07-03 NOTE — TELEPHONE ENCOUNTER
hypertension, cardiovascular morbidities, car accidents and all cause mortality.  -Patient education handout provided regarding sleep tips and PAP cleaning recommendations      Follow up: 6 weeks for compliance/AHI, sooner if needed

## 2018-07-11 ENCOUNTER — OFFICE VISIT (OUTPATIENT)
Dept: PULMONOLOGY | Age: 63
End: 2018-07-11

## 2018-07-11 VITALS
BODY MASS INDEX: 47.74 KG/M2 | WEIGHT: 315 LBS | SYSTOLIC BLOOD PRESSURE: 129 MMHG | HEIGHT: 68 IN | DIASTOLIC BLOOD PRESSURE: 58 MMHG | OXYGEN SATURATION: 95 % | HEART RATE: 61 BPM | RESPIRATION RATE: 20 BRPM | TEMPERATURE: 98 F

## 2018-07-11 DIAGNOSIS — Z71.89 ENCOUNTER FOR BIPAP USE COUNSELING: ICD-10-CM

## 2018-07-11 DIAGNOSIS — G47.10 HYPERSOMNIA: ICD-10-CM

## 2018-07-11 DIAGNOSIS — R68.2 DRY MOUTH: ICD-10-CM

## 2018-07-11 DIAGNOSIS — G47.33 OSA (OBSTRUCTIVE SLEEP APNEA): Primary | ICD-10-CM

## 2018-07-11 DIAGNOSIS — E66.01 OBESITY, CLASS III, BMI 40-49.9 (MORBID OBESITY) (HCC): ICD-10-CM

## 2018-07-11 DIAGNOSIS — Z72.821 POOR SLEEP HYGIENE: ICD-10-CM

## 2018-07-11 PROCEDURE — 99214 OFFICE O/P EST MOD 30 MIN: CPT | Performed by: NURSE PRACTITIONER

## 2018-07-11 PROCEDURE — 3017F COLORECTAL CA SCREEN DOC REV: CPT | Performed by: NURSE PRACTITIONER

## 2018-07-11 PROCEDURE — G8417 CALC BMI ABV UP PARAM F/U: HCPCS | Performed by: NURSE PRACTITIONER

## 2018-07-11 PROCEDURE — G8427 DOCREV CUR MEDS BY ELIG CLIN: HCPCS | Performed by: NURSE PRACTITIONER

## 2018-07-11 PROCEDURE — G8598 ASA/ANTIPLAT THER USED: HCPCS | Performed by: NURSE PRACTITIONER

## 2018-07-11 PROCEDURE — 1036F TOBACCO NON-USER: CPT | Performed by: NURSE PRACTITIONER

## 2018-07-11 ASSESSMENT — SLEEP AND FATIGUE QUESTIONNAIRES
HOW LIKELY ARE YOU TO NOD OFF OR FALL ASLEEP WHILE SITTING QUIETLY AFTER LUNCH WITHOUT ALCOHOL: 2
NECK CIRCUMFERENCE (INCHES): 20.5
HOW LIKELY ARE YOU TO NOD OFF OR FALL ASLEEP WHILE LYING DOWN TO REST IN THE AFTERNOON WHEN CIRCUMSTANCES PERMIT: 3
HOW LIKELY ARE YOU TO NOD OFF OR FALL ASLEEP WHILE SITTING AND TALKING TO SOMEONE: 0
HOW LIKELY ARE YOU TO NOD OFF OR FALL ASLEEP IN A CAR, WHILE STOPPED FOR A FEW MINUTES IN TRAFFIC: 0
HOW LIKELY ARE YOU TO NOD OFF OR FALL ASLEEP WHILE SITTING AND READING: 2
HOW LIKELY ARE YOU TO NOD OFF OR FALL ASLEEP WHILE WATCHING TV: 2
HOW LIKELY ARE YOU TO NOD OFF OR FALL ASLEEP WHEN YOU ARE A PASSENGER IN A CAR FOR AN HOUR WITHOUT A BREAK: 0
ESS TOTAL SCORE: 10
HOW LIKELY ARE YOU TO NOD OFF OR FALL ASLEEP WHILE SITTING INACTIVE IN A PUBLIC PLACE: 1

## 2018-07-11 NOTE — PROGRESS NOTES
.Up to date with Influenza vaccine.   Orders verbalized by Artur Walters CNP;  6 mo F/U  Orders faxed to [facility]: nasal mask Airfit P10

## 2018-07-11 NOTE — PROGRESS NOTES
MCG/ACT nasal spray, 1 spray by Nasal route nightly as needed. , Disp: , Rfl:     aspirin 81 MG chewable tablet, Take 1 tablet by mouth daily. , Disp: 30 tablet, Rfl:     isosorbide mononitrate (IMDUR) 30 MG CR tablet, Take 1 tablet by mouth daily. , Disp: 30 tablet, Rfl: 0    ferrous sulfate 325 (65 FE) MG tablet, Take 325 mg by mouth 3 times daily (with meals). , Disp: , Rfl:     omeprazole (PRILOSEC) 20 MG capsule, Take 20 mg by mouth 2 times daily. , Disp: , Rfl:       Objective:   PHYSICAL EXAM:    BP (!) 129/58 (Site: Left Arm, Position: Sitting)   Pulse 61   Temp 98 °F (36.7 °C) (Oral)   Resp 20   Ht 5' 8\" (1.727 m)   Wt (!) 317 lb (143.8 kg)   SpO2 95%   BMI 48.20 kg/m²     Physical exam:  Gen: No acute distress. Obese. BMI of 48.20  Eyes: PERRL. No sclera icterus. No conjunctival injection. ENT: No discharge. Pharynx clear. Mallampati class IV. Neck: Trachea midline. No obvious mass. Neck circumference 20.5\"  Resp: No accessory muscle use. No crackles. No wheezes. No rhonchi. CV: Regular rate. Regular rhythm. No murmur or rub. Skin: Warm and dry. M/S: No cyanosis. Uses cane  Neuro: Awake. Alert. Moves all four extremities. Psych: Oriented x 3. No anxiety. DATA:   9/28 17 PSG AHI 35.4/REM AHI 45.7, PLMS 38, low SPO2 73%  11/16/17 titration-controlled sleep-related breathing with BiPAP, PLMS 86.8 recommendations BiPAP 14/8 cm H2O    BIPAP compliance data:  Compliance download report from 3/4/18 to 4/2/18 reviewed today by me and showed patient is using machine 3:23 hrs/night with 30% compliance and AHI 0.8 within this time frame. 9/30days with greater than 4 hours of machine use. 30/30days with any BIPAP use  BIPAP 14/8 cm H20    Compliance download report from 4/17/18 to 5/16/18 showed patient is using machine 5:27 hrs/night with 63.3% compliance and AHI 5.1 within this time frame. 19/30days with greater than 4 hours of machine use.   BIPAP 14/8 cm H20    Compliance download report from 6/11/18 to 7/10/18 reviewed today by me and showed patient is using machine 6:42 hrs/night with 100% compliance and AHI 1.4 within this time frame. 30/30days with greater than 4 hours of machine use. BIPAP 14/8 cm H20    Assessment:      Severe HENNA. BIPAP 14/8 cm H2O  Optimal compliance and efficacy on review today. · Hypersomnia- sedating medications, poor sleep hygiene, co morbid conditions, HENNA likely contributing-improving. · Irregular sleep pattern  · Poor sleep hygiene  · Obesity   · Cardiomyopathy, CAD s/p CABG x3, pulmonary HTN, neuropathy, DM, HTN  · Chronic pain- followed by pain management    Plan:      -Continue BiPAP 14/8 cm H2O  -Congratulated on compliance  -Discussed severity of sleep apnea and importance of BiPAP use  -Mask fitting in office with RT- likes airfit P10 large  -Chin strapif not using full face mask  - Advised to use BiPAP 6-8 hrs at night and during naps-continue to increase use. - Replacement of mask, tubing, head straps every 3-6 months or sooner if damaged. - Patient instructed to contact [employer] for any mask, tubing or machine trouble shooting if problems arise.  - Sleep hygiene  -Continue to work with pain management for chronic pain  -Cognitive behavioral therapy was discussed with patient including stimulus control and sleep restriction   -Recommend set bed/wake times, no naps during the daytime. Use BiPAP when sleeping.  - Avoid sedatives, alcohol and caffeinated drinks at bed time. - Patient counseled to never drive or operate heavy machinery while fatigue, drowsy or sleepy- patient verbalized understanding and agrees.    - Weight loss is recommended as a long-term intervention.    - Complications of HENNA if not treated were discussed with patient patient, including: systemic hypertension, pulmonary hypertension, cardiovascular morbidities, car accidents and all cause mortality.  -Patient education handout provided regarding sleep tips and PAP cleaning recommendations     Follow up: 6 weeks for compliance/AHI, sooner if needed    More than 25 minutes of time was spent in direct patient contact during this visit, more than 50% of which was spent counseling and/or coordinating care.

## 2018-07-11 NOTE — PATIENT INSTRUCTIONS
is a stimulant effect and you will experience fragmented sleep. 6- Take a hot bath 90 minutes before bedtime:  A hot bath will raise your body temperature, but it is the drop in body temperature that may leave you feeling sleepy  7- Develop sleep rituals: it is important to give your body cues that it is time to slow down and sleep. Listen to relaxing music, read something soothing for 15 minutes, have a cup of caffeine free tea, or do relaxation exercises such as yoga or deep breathing help relieve anxiety and reduce muscle tension. 8- Fix a bedtime and an awakening time: Even on weekends! When your sleep cycle has a regular rhythm, you will feel better. 9- Sleep only when sleepy: This reduces the time you are awake in bed. 10- Get into your favorite sleeping position: If you can't fall asleep within 15-30 minutes, get up and do something boring until you feel sleepy. Sit quietly in the dark or read the warranty on your refrigerator. Don't expose yourself to bright light while you are up, it gives cues to your brain that it is time to wake up. 11- Only use your bed for sleeping: Dont use the bed as an office, workroom or recreation room. Let your body \"know\" that the bed is associated with sleeping  12- Use comfortable bedding. Uncomfortable bedding can prevent good sleep. Evaluate whether or not this is a source of your problem, and make appropriate changes. 13- Make sure your bed and bedroom are quiet and comfortable: A hot room can be uncomfortable. A cooler room, along with enough blankets to stay warm is recommended. Get a blackout shade or wear a slumber mask and wear earplugs or get a \"white noise\" machine for light and noise distractions. 14- Use sunlight to set your biological clock: When you get up in the morning, go outside and turn your face to the sun for 15 minutes. 13- Dont take your worries to bed: Leave worries about job, school, daily life, etc., behind when you go to bed.  Some people find it useful to assign a \"worry period\" during the evening or afternoon for these issues. CPAP Equipment Cleaning and Disinfecting Schedule  Equipment Cleaning Frequency Instructions  Disinfecting Frequency   Non-Disposable Filters  Weekly Mild soapy water, Rinse, Air Dry Not Required   Disposable Filters Change as needed  2-4 weeks Do Not Wash Not Required   Hose/tubing Daily Mild soapy water, Rinse, Air Dry Once a week   Mask / Nasal Pillows Daily Mild soapy water, Rinse, Air Dry Once a week   Headgear Weekly Hand wash, Mild soapy water, Rinse, Dry  Not Required   Humidifier Daily Empty water daily  Mild soapy water, Rinse well, Air Dry  Once a week   CPAP Unit As Needed Dust with damp cloth,  No detergents or sprays Not Required         Disinfect (per schedule) with 1 part white vinegar and 3 parts water- soak mask and water chamber for 30 minutes every 1-2 weeks, more often if sick. Allow water/vinegar mixture to run through tubing. Allow all equipment to air dry. Drying Hints:   Always hang tubing away from direct sunlight, as this will cause the tubing to become yellow, brittle and crack over a period of time. DO NOT attach the wet tubing to your CPAP unit to blow-dry it. The moisture from the tubing can drain back into your machine. Moisture in your unit can cause sudden pressure increases or short circuits  DO's and DON'Ts:  - Don't use alcohol-based products to clean your mask, because it can cause the materials to become hard and brittle. - Don't put headgear in the washer or dryer  - Don't use any caustic or household cleaning solutions such as bleach on your CPAP   equipment.  - Do follow the recommended cleaning schedule. - Do change your disposable filter frequently. Adapted From: [employer]/cleaning. shtm.   These are general suggestions for all models please follow specific s recommendations and specific instructions

## 2018-07-16 ENCOUNTER — HOSPITAL ENCOUNTER (OUTPATIENT)
Age: 63
Discharge: HOME | End: 2018-07-16
Payer: MEDICARE

## 2018-07-16 LAB
ANION GAP SERPL CALCULATED.3IONS-SCNC: 16 MMOL/L (ref 3–16)
BUN BLDV-MCNC: 65 MG/DL (ref 7–20)
CALCIUM SERPL-MCNC: 9.1 MG/DL (ref 8.3–10.6)
CHLORIDE BLD-SCNC: 96 MMOL/L (ref 99–110)
CO2: 27 MMOL/L (ref 21–32)
CREAT SERPL-MCNC: 1.3 MG/DL (ref 0.8–1.3)
GFR AFRICAN AMERICAN: >60
GFR NON-AFRICAN AMERICAN: 56
GLUCOSE BLD-MCNC: 254 MG/DL (ref 70–99)
POTASSIUM SERPL-SCNC: 3.4 MMOL/L (ref 3.5–5.1)
SODIUM BLD-SCNC: 139 MMOL/L (ref 136–145)

## 2018-07-16 PROCEDURE — 80048 BASIC METABOLIC PNL TOTAL CA: CPT

## 2018-07-16 PROCEDURE — 36415 COLL VENOUS BLD VENIPUNCTURE: CPT

## 2018-09-06 ENCOUNTER — TELEPHONE (OUTPATIENT)
Dept: ORTHOPEDIC SURGERY | Age: 63
End: 2018-09-06

## 2018-09-10 ENCOUNTER — OFFICE VISIT (OUTPATIENT)
Dept: ORTHOPEDIC SURGERY | Age: 63
End: 2018-09-10

## 2018-09-10 VITALS
HEART RATE: 68 BPM | BODY MASS INDEX: 47.44 KG/M2 | HEIGHT: 68 IN | DIASTOLIC BLOOD PRESSURE: 70 MMHG | SYSTOLIC BLOOD PRESSURE: 150 MMHG | WEIGHT: 313 LBS

## 2018-09-10 DIAGNOSIS — M48.061 SPINAL STENOSIS OF LUMBAR REGION, UNSPECIFIED WHETHER NEUROGENIC CLAUDICATION PRESENT: ICD-10-CM

## 2018-09-10 DIAGNOSIS — G89.4 CHRONIC PAIN SYNDROME: Primary | ICD-10-CM

## 2018-09-10 PROCEDURE — G8598 ASA/ANTIPLAT THER USED: HCPCS | Performed by: PHYSICIAN ASSISTANT

## 2018-09-10 PROCEDURE — 1036F TOBACCO NON-USER: CPT | Performed by: PHYSICIAN ASSISTANT

## 2018-09-10 PROCEDURE — 99213 OFFICE O/P EST LOW 20 MIN: CPT | Performed by: PHYSICIAN ASSISTANT

## 2018-09-10 PROCEDURE — G8417 CALC BMI ABV UP PARAM F/U: HCPCS | Performed by: PHYSICIAN ASSISTANT

## 2018-09-10 PROCEDURE — G8427 DOCREV CUR MEDS BY ELIG CLIN: HCPCS | Performed by: PHYSICIAN ASSISTANT

## 2018-09-10 PROCEDURE — 3017F COLORECTAL CA SCREEN DOC REV: CPT | Performed by: PHYSICIAN ASSISTANT

## 2018-09-10 RX ORDER — OXYCODONE HYDROCHLORIDE AND ACETAMINOPHEN 5; 325 MG/1; MG/1
1 TABLET ORAL 4 TIMES DAILY PRN
Qty: 120 TABLET | Refills: 0 | Status: SHIPPED | OUTPATIENT
Start: 2018-11-10 | End: 2018-12-10

## 2018-09-10 RX ORDER — OXYCODONE HYDROCHLORIDE AND ACETAMINOPHEN 5; 325 MG/1; MG/1
1 TABLET ORAL 4 TIMES DAILY PRN
Qty: 120 TABLET | Refills: 0 | Status: SHIPPED | OUTPATIENT
Start: 2018-09-13 | End: 2018-10-13

## 2018-09-10 RX ORDER — OXYCODONE HYDROCHLORIDE AND ACETAMINOPHEN 5; 325 MG/1; MG/1
1 TABLET ORAL 4 TIMES DAILY PRN
Qty: 120 TABLET | Refills: 0 | Status: SHIPPED | OUTPATIENT
Start: 2018-10-12 | End: 2018-11-11

## 2018-09-10 NOTE — PROGRESS NOTES
activity: Yes    Pain Scale: 1-10  With Meds:   7/10  Pain Scale: 1-10 Without Meds: 9+/10     Potential aberrant drug-related behavior:  · Aberrant behavior identified? NO  · Potential aberrant behavior identified? NO  · Reports loss are stolen prescriptions? NO  · Insist on certain medications by name? NO  · Purposeful oversedation? NO  · Increased dose without authorization? NO  · MED: 30  · Last UDS: , as expected    Side Effects: Denies    Past Medical History: Medical history form was reviewd today & scanned into the Media tab  Past Medical History:   Diagnosis Date    Anemia     Angina     Arthritis     CAD (coronary artery disease)     CHF (congestive heart failure) (HCC)     CKD (chronic kidney disease) stage 3, GFR 30-59 ml/min     Clostridium difficile diarrhea 3/16/12; 2/29/12    positive stool toxin    Diabetes mellitus ([address]     Diabetic neuropathy (HCC)     feet and legs    Disease of blood and blood forming organ     GERD (gastroesophageal reflux disease)     gastric ulcer    Hyperlipidemia     Hypertension     Kidney stone 2002    Refusal of blood transfusions as patient is Mormonism     Sleep apnea     Venous ulcer ([facility] [address]     LLE    Wound, open 1/13/2012        REVIEW OF SYSTEMS:   CONSTITUTIONAL: Denies unexplained weight loss, fevers, chills or fatigue  NEUROLOGIC: Denies tremors or seizures         PHYSICAL EXAM:    Vitals: Blood pressure (!) 150/70, pulse 68, height 5' 7.99\" (1.727 m), weight (!) 313 lb (142 kg). GENERAL EXAM:  · General Apparence: Obesity. Patient is adequately groomed with no evidence of malnutrition. · Orientation: The patient is oriented to time, place and person. · Mood & Affect:The patient's mood and affect are appropriate   · Sensation: Sensation is intact without deficit  LUMBAR/SACRAL EXAMINATION:  · Inspection: Local inspection shows no step-off or bruising. Kyphosis      · Palpation:   No evidence of tenderness at the midline. [place]   4) h/o CHF, CKD, CHF, CAD, DM, peripheral neuropathy   5) Sx consult, Dr. Ponce Cooper sx   6) medication maintenance with compliance, low risk per ORT=3  7) Previous ESIs & RFN w/out lasting benefit         Plan:    1) Percocet 5/325 I po QID PRN #120 x 3 scripts for chronic pain. We discussed referral to Dr. Viji Castro to further manage his chronic pain. He is not a good surgical candidate and has failed other conservative modalities listed above     Controlled Substances Monitoring:     RX Monitoring 9/10/2018   Attestation The Prescription Monitoring Report for this patient was reviewed today. Documentation Possible medication side effects, risk of tolerance/dependence & alternative treatments discussed. ;Obtaining appropriate analgesic effect of treatment. ;No signs of potential drug abuse or diversion identified. Chronic Pain Treatment objectives documented - patient is progressing appropriately. ;Functional status reviewed - continues with improved or maintaining ADL's.;Reviewed the patient's functional status and documentation. Medication Contracts -             The risks and use of maintenance opiate medication were reviewed. These include the risk of tolerance, addition, and abuse. Potential side effects were also discussed. Medications are to be taken as prescribed and not escalated without prior agreement. OARRS/DIONNE reviewed & appropriate.    Opiate medications will only be prescribed through the office of BRANNON Lai unless notified otherwise             [facility]

## 2018-09-12 ENCOUNTER — HOSPITAL ENCOUNTER (OUTPATIENT)
Age: 63
Discharge: HOME | End: 2018-09-12
Payer: MEDICARE

## 2018-09-12 LAB
ANION GAP SERPL CALCULATED.3IONS-SCNC: 18 MMOL/L (ref 3–16)
BUN BLDV-MCNC: 73 MG/DL (ref 7–20)
CALCIUM SERPL-MCNC: 9.5 MG/DL (ref 8.3–10.6)
CHLORIDE BLD-SCNC: 86 MMOL/L (ref 99–110)
CO2: 29 MMOL/L (ref 21–32)
CREAT SERPL-MCNC: 1.6 MG/DL (ref 0.8–1.3)
GFR AFRICAN AMERICAN: 53
GFR NON-AFRICAN AMERICAN: 44
GLUCOSE BLD-MCNC: 381 MG/DL (ref 70–99)
POTASSIUM SERPL-SCNC: 3.3 MMOL/L (ref 3.5–5.1)
SODIUM BLD-SCNC: 133 MMOL/L (ref 136–145)

## 2018-09-12 PROCEDURE — 36415 COLL VENOUS BLD VENIPUNCTURE: CPT

## 2018-09-12 PROCEDURE — 80048 BASIC METABOLIC PNL TOTAL CA: CPT

## 2018-09-14 NOTE — TELEPHONE ENCOUNTER
SCANNED INTO O;  [facility] RECORDS/ITEMIZED BILLING FOR ALEXANDER Bruner ([address] [place] [address]) FROM 4/15/2018 TO PRESENT.

## 2018-09-24 PROBLEM — M51.379 DEGENERATION OF LUMBAR OR LUMBOSACRAL INTERVERTEBRAL DISC: Chronic | Status: ACTIVE | Noted: 2018-09-24

## 2018-09-24 PROBLEM — M51.379 DEGENERATION OF LUMBAR OR LUMBOSACRAL INTERVERTEBRAL DISC: Status: ACTIVE | Noted: 2018-09-24

## 2018-09-24 PROBLEM — M47.817 LUMBOSACRAL SPONDYLOSIS WITHOUT MYELOPATHY: Status: ACTIVE | Noted: 2018-09-24

## 2018-09-24 PROBLEM — M51.37 DEGENERATION OF LUMBAR OR LUMBOSACRAL INTERVERTEBRAL DISC: Status: ACTIVE | Noted: 2018-09-24

## 2018-09-24 PROBLEM — M47.817 LUMBOSACRAL SPONDYLOSIS WITHOUT MYELOPATHY: Chronic | Status: ACTIVE | Noted: 2018-09-24

## 2018-09-24 PROBLEM — M51.26 DISPLACEMENT OF LUMBAR INTERVERTEBRAL DISC WITHOUT MYELOPATHY: Chronic | Status: ACTIVE | Noted: 2018-09-24

## 2018-09-24 PROBLEM — M51.37 DEGENERATION OF LUMBAR OR LUMBOSACRAL INTERVERTEBRAL DISC: Chronic | Status: ACTIVE | Noted: 2018-09-24

## 2018-09-24 PROBLEM — M51.26 DISPLACEMENT OF LUMBAR INTERVERTEBRAL DISC WITHOUT MYELOPATHY: Status: ACTIVE | Noted: 2018-09-24

## 2018-10-08 ENCOUNTER — HOSPITAL ENCOUNTER (OUTPATIENT)
Age: 63
Discharge: HOME | End: 2018-10-08
Payer: MEDICARE

## 2018-10-08 LAB
A/G RATIO: 1.6 (ref 1.1–2.2)
ALBUMIN SERPL-MCNC: 4.2 G/DL (ref 3.4–5)
ALP BLD-CCNC: 120 U/L (ref 40–129)
ALT SERPL-CCNC: 27 U/L (ref 10–40)
ANION GAP SERPL CALCULATED.3IONS-SCNC: 16 MMOL/L (ref 3–16)
AST SERPL-CCNC: 28 U/L (ref 15–37)
BILIRUB SERPL-MCNC: 0.5 MG/DL (ref 0–1)
BUN BLDV-MCNC: 69 MG/DL (ref 7–20)
CALCIUM SERPL-MCNC: 9 MG/DL (ref 8.3–10.6)
CHLORIDE BLD-SCNC: 94 MMOL/L (ref 99–110)
CO2: 27 MMOL/L (ref 21–32)
CREAT SERPL-MCNC: 1.4 MG/DL (ref 0.8–1.3)
GFR AFRICAN AMERICAN: >60
GFR NON-AFRICAN AMERICAN: 51
GLOBULIN: 2.6 G/DL
GLUCOSE BLD-MCNC: 304 MG/DL (ref 70–99)
POTASSIUM SERPL-SCNC: 3.7 MMOL/L (ref 3.5–5.1)
SODIUM BLD-SCNC: 137 MMOL/L (ref 136–145)
TOTAL PROTEIN: 6.8 G/DL (ref 6.4–8.2)

## 2018-10-08 PROCEDURE — 36415 COLL VENOUS BLD VENIPUNCTURE: CPT

## 2018-10-08 PROCEDURE — 80053 COMPREHEN METABOLIC PANEL: CPT

## 2018-11-15 ENCOUNTER — HOSPITAL ENCOUNTER (OUTPATIENT)
Age: 63
Setting detail: SPECIMEN
Discharge: HOME | End: 2018-11-15
Payer: MEDICARE

## 2018-11-15 LAB
A/G RATIO: 1.5 (ref 1.1–2.2)
ALBUMIN SERPL-MCNC: 4.3 G/DL (ref 3.4–5)
ALP BLD-CCNC: 109 U/L (ref 40–129)
ALT SERPL-CCNC: 21 U/L (ref 10–40)
ANION GAP SERPL CALCULATED.3IONS-SCNC: 17 MMOL/L (ref 3–16)
AST SERPL-CCNC: 24 U/L (ref 15–37)
BILIRUB SERPL-MCNC: 0.5 MG/DL (ref 0–1)
BUN BLDV-MCNC: 85 MG/DL (ref 7–20)
CALCIUM SERPL-MCNC: 9.6 MG/DL (ref 8.3–10.6)
CHLORIDE BLD-SCNC: 90 MMOL/L (ref 99–110)
CO2: 31 MMOL/L (ref 21–32)
CREAT SERPL-MCNC: 1.4 MG/DL (ref 0.8–1.3)
GFR AFRICAN AMERICAN: >60
GFR NON-AFRICAN AMERICAN: 51
GLOBULIN: 2.8 G/DL
GLUCOSE BLD-MCNC: 320 MG/DL (ref 70–99)
POTASSIUM SERPL-SCNC: 3.9 MMOL/L (ref 3.5–5.1)
SODIUM BLD-SCNC: 138 MMOL/L (ref 136–145)
TOTAL PROTEIN: 7.1 G/DL (ref 6.4–8.2)

## 2018-11-15 PROCEDURE — 80053 COMPREHEN METABOLIC PANEL: CPT

## 2018-11-15 PROCEDURE — 36415 COLL VENOUS BLD VENIPUNCTURE: CPT

## 2018-12-20 ENCOUNTER — HOSPITAL ENCOUNTER (OUTPATIENT)
Age: 63
Setting detail: SPECIMEN
Discharge: HOME | End: 2018-12-20
Payer: MEDICARE

## 2018-12-20 LAB
ANION GAP SERPL CALCULATED.3IONS-SCNC: 14 MMOL/L (ref 3–16)
BUN BLDV-MCNC: 63 MG/DL (ref 7–20)
CALCIUM SERPL-MCNC: 9.5 MG/DL (ref 8.3–10.6)
CHLORIDE BLD-SCNC: 95 MMOL/L (ref 99–110)
CO2: 27 MMOL/L (ref 21–32)
CREAT SERPL-MCNC: 1.6 MG/DL (ref 0.8–1.3)
GFR AFRICAN AMERICAN: 53
GFR NON-AFRICAN AMERICAN: 44
GLUCOSE BLD-MCNC: 271 MG/DL (ref 70–99)
POTASSIUM SERPL-SCNC: 5 MMOL/L (ref 3.5–5.1)
SODIUM BLD-SCNC: 136 MMOL/L (ref 136–145)

## 2018-12-20 PROCEDURE — 36415 COLL VENOUS BLD VENIPUNCTURE: CPT

## 2018-12-20 PROCEDURE — 80048 BASIC METABOLIC PNL TOTAL CA: CPT

## 2019-01-10 ENCOUNTER — TELEPHONE (OUTPATIENT)
Dept: PULMONOLOGY | Age: 64
End: 2019-01-10

## 2019-01-14 LAB
AVERAGE GLUCOSE: NORMAL
HBA1C MFR BLD: 8.8 %

## 2019-02-05 ENCOUNTER — TELEPHONE (OUTPATIENT)
Dept: PULMONOLOGY | Age: 64
End: 2019-02-05

## 2019-02-11 PROBLEM — M47.816 LUMBAR FACET ARTHROPATHY: Status: ACTIVE | Noted: 2019-02-11

## 2019-02-11 PROBLEM — M51.26 DISC DISPLACEMENT, LUMBAR: Chronic | Status: ACTIVE | Noted: 2019-02-11

## 2019-02-11 PROBLEM — M47.816 LUMBAR FACET ARTHROPATHY: Chronic | Status: ACTIVE | Noted: 2019-02-11

## 2019-02-11 PROBLEM — M48.061 SPINAL STENOSIS OF LUMBAR REGION: Chronic | Status: ACTIVE | Noted: 2019-02-11

## 2019-02-11 PROBLEM — M51.26 DISC DISPLACEMENT, LUMBAR: Status: ACTIVE | Noted: 2019-02-11

## 2019-02-11 PROBLEM — M48.061 SPINAL STENOSIS OF LUMBAR REGION: Status: ACTIVE | Noted: 2019-02-11

## 2019-04-02 ENCOUNTER — OFFICE VISIT (OUTPATIENT)
Dept: PULMONOLOGY | Age: 64
End: 2019-04-02
Payer: MEDICARE

## 2019-04-02 ENCOUNTER — TELEPHONE (OUTPATIENT)
Dept: PULMONOLOGY | Age: 64
End: 2019-04-02

## 2019-04-02 VITALS
TEMPERATURE: 97.7 F | RESPIRATION RATE: 16 BRPM | OXYGEN SATURATION: 95 % | HEART RATE: 72 BPM | WEIGHT: 315 LBS | BODY MASS INDEX: 47.74 KG/M2 | SYSTOLIC BLOOD PRESSURE: 134 MMHG | DIASTOLIC BLOOD PRESSURE: 62 MMHG | HEIGHT: 68 IN

## 2019-04-02 DIAGNOSIS — R53.83 OTHER FATIGUE: ICD-10-CM

## 2019-04-02 DIAGNOSIS — G47.10 HYPERSOMNIA: ICD-10-CM

## 2019-04-02 DIAGNOSIS — G47.33 OSA (OBSTRUCTIVE SLEEP APNEA): Primary | ICD-10-CM

## 2019-04-02 DIAGNOSIS — E66.01 OBESITY, CLASS III, BMI 40-49.9 (MORBID OBESITY) (HCC): ICD-10-CM

## 2019-04-02 DIAGNOSIS — Z71.89 ENCOUNTER FOR BIPAP USE COUNSELING: ICD-10-CM

## 2019-04-02 DIAGNOSIS — Z72.821 POOR SLEEP HYGIENE: ICD-10-CM

## 2019-04-02 PROCEDURE — 4040F PNEUMOC VAC/ADMIN/RCVD: CPT | Performed by: NURSE PRACTITIONER

## 2019-04-02 PROCEDURE — 3017F COLORECTAL CA SCREEN DOC REV: CPT | Performed by: NURSE PRACTITIONER

## 2019-04-02 PROCEDURE — G8427 DOCREV CUR MEDS BY ELIG CLIN: HCPCS | Performed by: NURSE PRACTITIONER

## 2019-04-02 PROCEDURE — G8599 NO ASA/ANTIPLAT THER USE RNG: HCPCS | Performed by: NURSE PRACTITIONER

## 2019-04-02 PROCEDURE — 1036F TOBACCO NON-USER: CPT | Performed by: NURSE PRACTITIONER

## 2019-04-02 PROCEDURE — G8417 CALC BMI ABV UP PARAM F/U: HCPCS | Performed by: NURSE PRACTITIONER

## 2019-04-02 PROCEDURE — 1123F ACP DISCUSS/DSCN MKR DOCD: CPT | Performed by: NURSE PRACTITIONER

## 2019-04-02 PROCEDURE — 99214 OFFICE O/P EST MOD 30 MIN: CPT | Performed by: NURSE PRACTITIONER

## 2019-04-02 ASSESSMENT — SLEEP AND FATIGUE QUESTIONNAIRES
HOW LIKELY ARE YOU TO NOD OFF OR FALL ASLEEP WHILE SITTING QUIETLY AFTER LUNCH WITHOUT ALCOHOL: 2
HOW LIKELY ARE YOU TO NOD OFF OR FALL ASLEEP WHILE SITTING AND READING: 3
HOW LIKELY ARE YOU TO NOD OFF OR FALL ASLEEP WHILE SITTING AND TALKING TO SOMEONE: 0
ESS TOTAL SCORE: 11
HOW LIKELY ARE YOU TO NOD OFF OR FALL ASLEEP WHILE SITTING INACTIVE IN A PUBLIC PLACE: 0
HOW LIKELY ARE YOU TO NOD OFF OR FALL ASLEEP IN A CAR, WHILE STOPPED FOR A FEW MINUTES IN TRAFFIC: 0
HOW LIKELY ARE YOU TO NOD OFF OR FALL ASLEEP WHEN YOU ARE A PASSENGER IN A CAR FOR AN HOUR WITHOUT A BREAK: 0
NECK CIRCUMFERENCE (INCHES): 20.5
HOW LIKELY ARE YOU TO NOD OFF OR FALL ASLEEP WHILE LYING DOWN TO REST IN THE AFTERNOON WHEN CIRCUMSTANCES PERMIT: 3
HOW LIKELY ARE YOU TO NOD OFF OR FALL ASLEEP WHILE WATCHING TV: 3

## 2019-04-02 NOTE — PATIENT INSTRUCTIONS
before bed: Having an empty stomach can interfere with your sleep. Dairy products and turkey contain tryptophan, which acts as a natural sleep inducer. 5- Stay away from caffeine, nicotine and alcohol at least 4-6 hours before bed: Caffeine and nicotine are stimulants that interfere with your ability to fall asleep. While alcohol has an immediate sleep-inducing effect, a few hours later, as alcohol levels in your blood start to fall, there is a stimulant effect and you will experience fragmented sleep. 6- Take a hot bath 90 minutes before bedtime:  A hot bath will raise your body temperature, but it is the drop in body temperature that may leave you feeling sleepy  7- Develop sleep rituals: it is important to give your body cues that it is time to slow down and sleep. Listen to relaxing music, read something soothing for 15 minutes, have a cup of caffeine free tea, or do relaxation exercises such as yoga or deep breathing help relieve anxiety and reduce muscle tension. 8- Fix a bedtime and an awakening time: Even on weekends! When your sleep cycle has a regular rhythm, you will feel better. 9- Sleep only when sleepy: This reduces the time you are awake in bed. 10- Get into your favorite sleeping position: If you can't fall asleep within 15-30 minutes, get up and do something boring until you feel sleepy. Sit quietly in the dark or read the warranty on your refrigerator. Don't expose yourself to bright light while you are up, it gives cues to your brain that it is time to wake up. 11- Only use your bed for sleeping: Dont use the bed as an office, workroom or recreation room. Let your body \"know\" that the bed is associated with sleeping  12- Use comfortable bedding. Uncomfortable bedding can prevent good sleep. Evaluate whether or not this is a source of your problem, and make appropriate changes. 13- Make sure your bed and bedroom are quiet and comfortable: A hot room can be uncomfortable.  A cooler room, along with enough blankets to stay warm is recommended. Get a blackout shade or wear a slumber mask and wear earplugs or get a \"white noise\" machine for light and noise distractions. 14- Use sunlight to set your biological clock: When you get up in the morning, go outside and turn your face to the sun for 15 minutes. 13- Dont take your worries to bed: Leave worries about job, school, daily life, etc., behind when you go to bed. Some people find it useful to assign a \"worry period\" during the evening or afternoon for these issues. CPAP Equipment Cleaning and Disinfecting Schedule  Equipment Cleaning Frequency Instructions  Disinfecting Frequency   Non-Disposable Filters  Weekly Mild soapy water, Rinse, Air Dry Not Required   Disposable Filters Change as needed  2-4 weeks Do Not Wash Not Required   Hose/tubing Daily Mild soapy water, Rinse, Air Dry Once a week   Mask / Nasal Pillows Daily Mild soapy water, Rinse, Air Dry Once a week   Headgear Weekly Hand wash, Mild soapy water, Rinse, Dry  Not Required   Humidifier Daily Empty water daily  Mild soapy water, Rinse well, Air Dry  Once a week   CPAP Unit As Needed Dust with damp cloth,  No detergents or sprays Not Required         Disinfect (per schedule) with 1 part white vinegar and 3 parts water- soak mask and water chamber for 30 minutes every 1-2 weeks, more often if sick. Allow water/vinegar mixture to run through tubing. Allow all equipment to air dry. Drying Hints:   Always hang tubing away from direct sunlight, as this will cause the tubing to become yellow, brittle and crack over a period of time. DO NOT attach the wet tubing to your CPAP unit to blow-dry it. The moisture from the tubing can drain back into your machine. Moisture in your unit can cause sudden pressure increases or short circuits  DO's and DON'Ts:  - Don't use alcohol-based products to clean your mask, because it can cause the materials to become hard and brittle.    - Don't put headgear in the washer or dryer  - Don't use any caustic or household cleaning solutions such as bleach on your CPAP   equipment.  - Do follow the recommended cleaning schedule. - Do change your disposable filter frequently. Adapted From: [email or web address]. shtm.   These are general suggestions for all models please follow specific s recommendations and specific instructions

## 2019-04-02 NOTE — PROGRESS NOTES
Orders verbalized by Loi Julian CNP;  3-4 mo f/u  Orders faxed to DME: Samm nasal pillows mask  Phone note

## 2019-04-02 NOTE — PROGRESS NOTES
Patient ID: Justin Flores is a 71 y.o. male who is being seen today for   Chief Complaint   Patient presents with    Sleep Apnea     6 mo         HPI:     Justin Flores is a 71 y.o. male in office for HENNA follow up. States he is not doing as well with BiPAP, states pressure does not feel the same as it used to. States is not sleeping as well. He was compliant at last visit, he is not now. Patient is using BiPAP 1-2 hrs/night. Using humidifier. No snoring on BiPAP. The pressure feels too low. The mask is comfortable-nasal pillow. Unsure if mask leak. Some daytime sleepiness. No driving. No dry nose or throat. No fatigue. States his sleep schedule is not regular. States pain medication makes him sleepy, naps in the day. No headache in am. No weight gain. No caffienated beverages during the day. No alcohol. ESS is 1.       Sleep Medicine 7/11/2018 5/17/2018 4/3/2018 8/22/2017   Sitting and reading 2 2 3 2   Watching TV 2 2 3 2   Sitting, inactive in a public place (e.g. a theatre or a meeting) 1 2 0 0   As a passenger in a car for an hour without a break 0 0 3 0   Lying down to rest in the afternoon when circumstances permit 3 3 3 2   Sitting and talking to someone 0 0 0 0   Sitting quietly after a lunch without alcohol 2 2 3 3   In a car, while stopped for a few minutes in traffic 0 0 0 0   Total score 10 11 15 9   Neck circumference 20.5 20.25 20.5 20.5       Past Medical History:  Past Medical History:   Diagnosis Date    Anemia     Angina     Arthritis     CAD (coronary artery disease)     [facility]     CKD (chronic kidney disease) stage 3, GFR 30-59 ml/min ([facility]     Clostridium difficile diarrhea 3/16/12; 2/29/12    positive stool toxin    Diabetes mellitus ([facility]     Diabetic neuropathy ([facility])     feet and legs    Disease of blood and blood forming organ     GERD (gastroesophageal reflux disease)     gastric ulcer    Hyperlipidemia     Hypertension     Kidney stone 2002    Refusal of blood transfusions as patient is Confucianism     Sleep apnea     Venous ulcer ([facility] [address]     LLE    Wound, open 1/13/2012       Past Surgical History:        Procedure Laterality Date    CARDIAC SURGERY      Dec 27 2010, triple bypass    CHOLECYSTECTOMY  9/2011    HERNIA REPAIR  age1    OTHER SURGICAL HISTORY  11-16-11 REPAIR LOWER STERNAL INCISION, REMOVAL OF ONE STERNAL WIRE,    OTHER SURGICAL HISTORY  10/10/2014    phacoemulsification of cataract with intraocular lens implant left eye    UPPER GASTROINTESTINAL ENDOSCOPY         Allergies:  is allergic to amitriptyline; celecoxib; cymbalta [duloxetine hcl]; elavil [amitriptyline hcl]; gabapentin; and nsaids. Social History:    TOBACCO:   reports that he quit smoking about 31 years ago. He has a 16.00 pack-year smoking history. He has never used smokeless tobacco.  ETOH:   reports that he does not drink alcohol. Family History:       Problem Relation Age of Onset    Heart Disease Mother     Heart Disease Father     Cancer Father     Diabetes Brother     Diabetes Brother        Current Medications:    Current Outpatient Medications:     oxyCODONE-acetaminophen (PERCOCET) 5-325 MG per tablet, Take 1 tablet by mouth 4 times daily for 30 days. Baldomero Sinha Date: 3/12/19, Disp: 120 tablet, Rfl: 0    naloxone 4 MG/0.1ML LIQD nasal spray, 1 spray by Nasal route as needed for Opioid Reversal, Disp: 1 each, Rfl: 0    colchicine (COLCRYS) 0.6 MG tablet, Take 1 tablet by mouth daily, Disp: 30 tablet, Rfl: 3    docusate sodium (COLACE, DULCOLAX) 100 MG CAPS, Take 100 mg by mouth 2 times daily, Disp: , Rfl:     metolazone (ZAROXOLYN) 5 MG tablet, Take 1 tablet by mouth as needed (weight gain > 3#), Disp: 10 tablet, Rfl: 0    potassium chloride (KLOR-CON M) 20 MEQ TBCR extended release tablet, Take 1 tablet by mouth 2 times daily, Disp: 60 tablet, Rfl: 3    torsemide (DEMADEX) 100 MG tablet, Take 1 tablet by mouth 2 times daily, Disp: 30 tablet, Rfl: 3    insulin aspart (NOVOLOG) 100 UNIT/ML injection vial, Inject into the skin 3 times daily (before meals) 50 units with breakfast, 20 units with lunch and 60 units with dinner, Disp: , Rfl:     allopurinol (ZYLOPRIM) 100 MG tablet, Take 300 mg by mouth , Disp: , Rfl:     atorvastatin (LIPITOR) 40 MG tablet, Take 1 tablet by mouth daily, Disp: 90 tablet, Rfl: 3    insulin glargine (LANTUS) 100 UNIT/ML injection vial, Inject 55 Units into the skin 2 times daily (Patient taking differently: Inject 80 Units into the skin 2 times daily ), Disp: 1 vial, Rfl: 1    Compression Stockings MISC, by Does not apply route 2 pair, knee high compression stockings, fitted/ zippered, Disp: 2 each, Rfl: 1    pregabalin (LYRICA) 50 MG capsule, Take 1 capsule by mouth Daily with supper (Patient taking differently: Take 25 mg by mouth Daily with supper ), Disp: 60 capsule, Rfl: 3    silver sulfADIAZINE (SILVADENE) 1 % cream, Apply to BL lower leg ulcers daily, Disp: 20 g, Rfl: 0    carvedilol (COREG) 25 MG tablet, Take 1 tablet by mouth 2 times daily (with meals), Disp: 180 tablet, Rfl: 3    nitroGLYCERIN (NITROSTAT) 0.4 MG SL tablet, Place 1 tablet under the tongue every 5 minutes as needed, Disp: 25 tablet, Rfl: 1    fluticasone (FLONASE) 50 MCG/ACT nasal spray, 1 spray by Nasal route nightly as needed. , Disp: , Rfl:     aspirin 81 MG chewable tablet, Take 1 tablet by mouth daily. , Disp: 30 tablet, Rfl:     isosorbide mononitrate (IMDUR) 30 MG CR tablet, Take 1 tablet by mouth daily. , Disp: 30 tablet, Rfl: 0    ferrous sulfate 325 (65 FE) MG tablet, Take 325 mg by mouth 3 times daily (with meals). , Disp: , Rfl:     omeprazole (PRILOSEC) 20 MG capsule, Take 20 mg by mouth 2 times daily. , Disp: , Rfl:     polyethylene glycol (MIRALAX) packet, Take 17 g by mouth daily. , Disp: , Rfl:       Objective:   PHYSICAL EXAM:    Resp 16   Ht 5' 8\" (1.727 m)   Wt (!) 319 lb (144.7 kg)   SpO2 95%   BMI 48.50 kg/m²     Physical exam:  Gen: No acute distress. Obese. BMI of 48.50  Eyes: PERRL. No sclera icterus. No conjunctival injection. ENT: No discharge. Pharynx clear. Mallampati class IV. Neck: Trachea midline. No obvious mass. Neck circumference 20.5\"  Resp: No accessory muscle use. No crackles. No wheezes. No rhonchi. CV: Regular rate. Regular rhythm. No murmur or rub. Skin: Warm and dry. M/S: No cyanosis. Uses cane  Neuro: Awake. Alert. Moves all four extremities. Psych: Oriented x 3. No anxiety. DATA:   9/28 17 PSG AHI 35.4/REM AHI 45.7, PLMS 38, low SPO2 73%  11/16/17 titration-controlled sleep-related breathing with BiPAP, PLMS 86.8 recommendations BiPAP 14/8 cm H2O    BIPAP compliance data:  Compliance download report from 3/4/18 to 4/2/18 reviewed today by me and showed patient is using machine 3:23 hrs/night with 30% compliance and AHI 0.8 within this time frame. 9/30days with greater than 4 hours of machine use. 30/30days with any BIPAP use  BIPAP 14/8 cm H20    Compliance download report from 4/17/18 to 5/16/18 showed patient is using machine 5:27 hrs/night with 63.3% compliance and AHI 5.1 within this time frame. 19/30days with greater than 4 hours of machine use. BIPAP 14/8 cm H20    Compliance download report from 6/11/18 to 7/10/18 reviewed today by me and showed patient is using machine 6:42 hrs/night with 100% compliance and AHI 1.4 within this time frame. 30/30days with greater than 4 hours of machine use. BIPAP 14/8 cm H20    Compliance download report from 3/2/19 to 3/31/19 reviewed today by me and showed patient is using machine 14 min/night with 0% compliance and AHI 5.5 within this time frame. 0/30days with greater than 4 hours of machine use. BIPAP 14/8 cm H20    Assessment:      Severe HENNA. BIPAP 14/8 cm H2O  Poor compliance on review today. · Hypersomnia- sedating medications, poor sleep hygiene, co morbid conditions, HENNA likely contributing-improving. Follow up: 6-8 weeks for compliance/AHI, sooner if needed    More than 25 minutes of time was spent in direct patient contact during this visit, more than 50% of which was spent counseling and/or coordinating care.

## 2019-05-03 NOTE — TELEPHONE ENCOUNTER
BiPAP compliance report from 4/3/19-5/2/19 on BiPAP 14/8 cm H2O reviewed. Compliance is poor 36%. AHI is good 1.7.    Please call patient and inform should use BIPAP at least 4 hours a night and ideally all night every night for maximum benefit

## 2019-07-08 ENCOUNTER — APPOINTMENT (OUTPATIENT)
Dept: GENERAL RADIOLOGY | Age: 64
End: 2019-07-08
Payer: MEDICARE

## 2019-07-08 ENCOUNTER — HOSPITAL ENCOUNTER (EMERGENCY)
Age: 64
Discharge: HOME | End: 2019-07-09
Payer: MEDICARE

## 2019-07-08 VITALS
HEART RATE: 62 BPM | RESPIRATION RATE: 16 BRPM | OXYGEN SATURATION: 97 % | DIASTOLIC BLOOD PRESSURE: 66 MMHG | SYSTOLIC BLOOD PRESSURE: 126 MMHG | TEMPERATURE: 98.4 F

## 2019-07-08 DIAGNOSIS — R14.0 ABDOMINAL DISTENTION: ICD-10-CM

## 2019-07-08 DIAGNOSIS — K59.03 DRUG-INDUCED CONSTIPATION: Primary | ICD-10-CM

## 2019-07-08 LAB
A/G RATIO: 1.1 (ref 1.1–2.2)
ALBUMIN SERPL-MCNC: 4.5 G/DL (ref 3.4–5)
ALP BLD-CCNC: 100 U/L (ref 40–129)
ALT SERPL-CCNC: 19 U/L (ref 10–40)
ANION GAP SERPL CALCULATED.3IONS-SCNC: 17 MMOL/L (ref 3–16)
AST SERPL-CCNC: 20 U/L (ref 15–37)
BASOPHILS ABSOLUTE: 0.1 K/UL (ref 0–0.2)
BASOPHILS RELATIVE PERCENT: 0.9 %
BILIRUB SERPL-MCNC: 0.5 MG/DL (ref 0–1)
BILIRUBIN URINE: NEGATIVE
BLOOD, URINE: NEGATIVE
BUN BLDV-MCNC: 113 MG/DL (ref 7–20)
CALCIUM SERPL-MCNC: 10.1 MG/DL (ref 8.3–10.6)
CHLORIDE BLD-SCNC: 88 MMOL/L (ref 99–110)
CLARITY: CLEAR
CO2: 30 MMOL/L (ref 21–32)
COLOR: YELLOW
CREAT SERPL-MCNC: 1.9 MG/DL (ref 0.8–1.3)
EOSINOPHILS ABSOLUTE: 0.1 K/UL (ref 0–0.6)
EOSINOPHILS RELATIVE PERCENT: 0.5 %
GFR AFRICAN AMERICAN: 43
GFR NON-AFRICAN AMERICAN: 36
GLOBULIN: 4 G/DL
GLUCOSE BLD-MCNC: 107 MG/DL (ref 70–99)
GLUCOSE URINE: NEGATIVE MG/DL
HCT VFR BLD CALC: 38.3 % (ref 40.5–52.5)
HEMOGLOBIN: 12.8 G/DL (ref 13.5–17.5)
KETONES, URINE: NEGATIVE MG/DL
LEUKOCYTE ESTERASE, URINE: NEGATIVE
LIPASE: 48 U/L (ref 13–60)
LYMPHOCYTES ABSOLUTE: 0.8 K/UL (ref 1–5.1)
LYMPHOCYTES RELATIVE PERCENT: 5.2 %
MCH RBC QN AUTO: 30.1 PG (ref 26–34)
MCHC RBC AUTO-ENTMCNC: 33.5 G/DL (ref 31–36)
MCV RBC AUTO: 89.7 FL (ref 80–100)
MICROSCOPIC EXAMINATION: NORMAL
MONOCYTES ABSOLUTE: 0.8 K/UL (ref 0–1.3)
MONOCYTES RELATIVE PERCENT: 5.7 %
NEUTROPHILS ABSOLUTE: 12.7 K/UL (ref 1.7–7.7)
NEUTROPHILS RELATIVE PERCENT: 87.7 %
NITRITE, URINE: NEGATIVE
PDW BLD-RTO: 17 % (ref 12.4–15.4)
PH UA: 6.5 (ref 5–8)
PLATELET # BLD: 236 K/UL (ref 135–450)
PMV BLD AUTO: 8.8 FL (ref 5–10.5)
POTASSIUM SERPL-SCNC: 4.2 MMOL/L (ref 3.5–5.1)
PROTEIN UA: NEGATIVE MG/DL
RBC # BLD: 4.27 M/UL (ref 4.2–5.9)
SODIUM BLD-SCNC: 135 MMOL/L (ref 136–145)
SPECIFIC GRAVITY UA: 1.01 (ref 1–1.03)
TOTAL PROTEIN: 8.5 G/DL (ref 6.4–8.2)
URINE REFLEX TO CULTURE: NORMAL
URINE TYPE: NORMAL
UROBILINOGEN, URINE: 0.2 E.U./DL
WBC # BLD: 14.5 K/UL (ref 4–11)

## 2019-07-08 PROCEDURE — 99284 EMERGENCY DEPT VISIT MOD MDM: CPT

## 2019-07-08 PROCEDURE — 85025 COMPLETE CBC W/AUTO DIFF WBC: CPT

## 2019-07-08 PROCEDURE — 6360000002 HC RX W HCPCS: Performed by: NURSE PRACTITIONER

## 2019-07-08 PROCEDURE — 81003 URINALYSIS AUTO W/O SCOPE: CPT

## 2019-07-08 PROCEDURE — 96372 THER/PROPH/DIAG INJ SC/IM: CPT

## 2019-07-08 PROCEDURE — 80053 COMPREHEN METABOLIC PANEL: CPT

## 2019-07-08 PROCEDURE — 83690 ASSAY OF LIPASE: CPT

## 2019-07-08 PROCEDURE — 74019 RADEX ABDOMEN 2 VIEWS: CPT

## 2019-07-08 RX ORDER — SENNOSIDES 8.6 MG
1 TABLET ORAL 2 TIMES DAILY
Qty: 120 TABLET | Refills: 0 | Status: SHIPPED | OUTPATIENT
Start: 2019-07-08

## 2019-07-08 RX ADMIN — METHYLNALTREXONE BROMIDE 12 MG: 12 INJECTION, SOLUTION SUBCUTANEOUS at 23:07

## 2019-07-08 ASSESSMENT — PAIN DESCRIPTION - PAIN TYPE: TYPE: ACUTE PAIN

## 2019-07-08 ASSESSMENT — ENCOUNTER SYMPTOMS
DIARRHEA: 0
WHEEZING: 0
CONSTIPATION: 1
ABDOMINAL DISTENTION: 1
COUGH: 0
SHORTNESS OF BREATH: 0
BACK PAIN: 0
NAUSEA: 0
COLOR CHANGE: 0
ABDOMINAL PAIN: 1
VOMITING: 0

## 2019-07-08 ASSESSMENT — PAIN SCALES - GENERAL: PAINLEVEL_OUTOF10: 8

## 2019-07-08 ASSESSMENT — PAIN DESCRIPTION - LOCATION: LOCATION: ABDOMEN

## 2019-07-09 NOTE — ED NOTES
Pt in bathroom to attempt to have BM, will try to give a urine sample.      Nikki Mauro RN  07/08/19 3656

## 2019-07-10 ENCOUNTER — TELEPHONE (OUTPATIENT)
Dept: PULMONOLOGY | Age: 64
End: 2019-07-10

## 2019-07-10 NOTE — TELEPHONE ENCOUNTER
Patient cancelled appointment on 7/11/19 with Lola Hyatt for 3/4/mo sleep follow up. Reason: has another doctors appt    Patient did not reschedule appointment. He will call back to reschedule when he finds out when he is able to come. Last OV 4/2/19    Assessment:       · Severe HENNA. BIPAP 14/8 cm H2O  Poor compliance on review today. · Hypersomnia- sedating medications, poor sleep hygiene, co morbid conditions, HENNA likely contributing-improving. · Irregular sleep pattern  · Poor sleep hygiene  · Obesity   · Cardiomyopathy, CAD s/p CABG x3, pulmonary HTN, neuropathy, DM, HTN  · Chronic pain- followed by pain management     Plan:       -Continue BiPAP 14/8 cm L2F-anas to increase use  - Upon inspection of CPAP, filter was stuffed with ?cat? Hair- which was likely impeding airflow, also seal on humidifier to unit was not on tightly. Filter was cleaned disposable filter replaced and seal was put on properly. Patient educated on recommended cleaning and replacement of supplies and discussed filter in detail. Patient put CPAP on in office after filter cleaned and seal fixed and he states pressure feels much better.  -Discussed severity of sleep apnea and importance of BiPAP use  -airfit P10 large currently- can change masks if needed  -Chin strap if not using full face mask  - Advised to use BiPAP 6-8 hrs at night and during naps-continue to increase use. - Replacement of mask, tubing, head straps every 3-6 months or sooner if damaged. - Patient instructed to contact [employer] for any mask, tubing or machine trouble shooting if problems arise.  - Sleep hygiene  -Continue to work with pain management for chronic pain  -Cognitive behavioral therapy was discussed with patient including stimulus control and sleep restriction   -Recommend set bed/wake times, no naps during the daytime. Use BiPAP when sleeping.  - Avoid sedatives, alcohol and caffeinated drinks at bed time.   - Patient counseled to

## 2019-08-05 ENCOUNTER — APPOINTMENT (OUTPATIENT)
Dept: GENERAL RADIOLOGY | Age: 64
End: 2019-08-05
Payer: MEDICARE

## 2019-08-05 ENCOUNTER — HOSPITAL ENCOUNTER (EMERGENCY)
Age: 64
Discharge: HOME | End: 2019-08-05
Attending: EMERGENCY MEDICINE
Payer: MEDICARE

## 2019-08-05 VITALS
TEMPERATURE: 98.2 F | HEIGHT: 68 IN | SYSTOLIC BLOOD PRESSURE: 127 MMHG | DIASTOLIC BLOOD PRESSURE: 67 MMHG | WEIGHT: 280 LBS | BODY MASS INDEX: 42.44 KG/M2 | HEART RATE: 62 BPM | OXYGEN SATURATION: 97 % | RESPIRATION RATE: 14 BRPM

## 2019-08-05 DIAGNOSIS — M25.552 LEFT HIP PAIN: ICD-10-CM

## 2019-08-05 DIAGNOSIS — M54.50 LUMBAR BACK PAIN: Primary | ICD-10-CM

## 2019-08-05 PROCEDURE — 6370000000 HC RX 637 (ALT 250 FOR IP): Performed by: EMERGENCY MEDICINE

## 2019-08-05 PROCEDURE — 72100 X-RAY EXAM L-S SPINE 2/3 VWS: CPT

## 2019-08-05 PROCEDURE — 99283 EMERGENCY DEPT VISIT LOW MDM: CPT

## 2019-08-05 PROCEDURE — 73502 X-RAY EXAM HIP UNI 2-3 VIEWS: CPT

## 2019-08-05 RX ORDER — OXYCODONE HYDROCHLORIDE 5 MG/1
5 TABLET ORAL EVERY 8 HOURS PRN
Qty: 9 TABLET | Refills: 0 | Status: SHIPPED | OUTPATIENT
Start: 2019-08-05 | End: 2019-08-08

## 2019-08-05 RX ORDER — OXYCODONE HYDROCHLORIDE 5 MG/1
5 TABLET ORAL ONCE
Status: COMPLETED | OUTPATIENT
Start: 2019-08-05 | End: 2019-08-05

## 2019-08-05 RX ORDER — CYCLOBENZAPRINE HCL 10 MG
10 TABLET ORAL ONCE
Status: COMPLETED | OUTPATIENT
Start: 2019-08-05 | End: 2019-08-05

## 2019-08-05 RX ORDER — CYCLOBENZAPRINE HCL 10 MG
10 TABLET ORAL 2 TIMES DAILY PRN
Qty: 20 TABLET | Refills: 0 | Status: SHIPPED | OUTPATIENT
Start: 2019-08-05 | End: 2019-08-15

## 2019-08-05 RX ADMIN — CYCLOBENZAPRINE HYDROCHLORIDE 10 MG: 10 TABLET, FILM COATED ORAL at 16:07

## 2019-08-05 RX ADMIN — OXYCODONE HYDROCHLORIDE 5 MG: 5 TABLET ORAL at 16:07

## 2019-08-05 ASSESSMENT — ENCOUNTER SYMPTOMS
SHORTNESS OF BREATH: 0
FACIAL SWELLING: 0
COLOR CHANGE: 0
ABDOMINAL PAIN: 0
VOICE CHANGE: 0
PHOTOPHOBIA: 0
NAUSEA: 0
BACK PAIN: 1
WHEEZING: 0
BLOOD IN STOOL: 0
TROUBLE SWALLOWING: 0
VOMITING: 0
STRIDOR: 0

## 2019-08-05 ASSESSMENT — PAIN DESCRIPTION - PAIN TYPE: TYPE: ACUTE PAIN

## 2019-08-05 ASSESSMENT — PAIN SCALES - GENERAL
PAINLEVEL_OUTOF10: 8
PAINLEVEL_OUTOF10: 8
PAINLEVEL_OUTOF10: 6

## 2019-08-05 ASSESSMENT — PAIN DESCRIPTION - ORIENTATION: ORIENTATION: LOWER

## 2019-08-05 ASSESSMENT — PAIN DESCRIPTION - LOCATION: LOCATION: BACK

## 2019-08-05 NOTE — ED PROVIDER NOTES
[facility]  ED  eMERGENCY dEPARTMENT eNCOUnter      Pt Name: Tucker Martinez  MRN: [id number]  [place] 1955  Date of evaluation: 8/5/2019  Provider: Hayley Cunningham MD    [address] [place] [address]       Chief Complaint   Patient presents with    Back Pain     related to fall on this past Thursday, called the squad at the time for help getting up; back pain and neuropathy progressively worse since then          HISTORY OF PRESENT ILLNESS   (Location/Symptom, Timing/Onset, Context/Setting, Quality, Duration, Modifying Factors, Severity)  Note limiting factors. Tucker Martinez is a 71 y.o. male with complex medical history including chronic back pain from spinal stenosis which he has seen back specialist for in the past who presents for continued back pain as well as a new fall 4 days ago. The patient reports that 4 days ago he leaned over too far while sitting on his bed and fell off of his bed onto a carpeted floor landing on his left hip. He denies any direct trauma to his lower back and also denies any head or neck trauma or pain. He reports he needed help getting up at first but refused transport to the emergency department. He reports that his symptoms have improved since the initial fall however he still does have 8 out of 10 back pain which is worse than usual as well as left hip pain. He reports he is able to ambulate with his cane and walker at home at his previous baseline with no new neurologic symptoms. He denies any urinary or bowel incontinence, change in the ability to ablate from his baseline, or saddle anesthesia. Reports his symptoms are severe, chronic, constant, and worsening. He reports movement and bending worsens his pain and pain medication improves his pain. HPI    Nursing Notes were reviewed.     REVIEW OFSYSTEMS    (2-9 systems for level 4, 10 or more for level 5)     Review of Systems   Constitutional: Negative for appetite change, fever and unexpected weight GLYCOL (MIRALAX) PACKET    Take 17 g by mouth daily. POTASSIUM CHLORIDE (KLOR-CON M) 20 MEQ TBCR EXTENDED RELEASE TABLET    Take 1 tablet by mouth 2 times daily    PREGABALIN (LYRICA) 50 MG CAPSULE    Take 1 capsule by mouth Daily with supper    SENNA (SENOKOT) 8.6 MG TABS TABLET    Take 1 tablet by mouth 2 times daily    SILVER SULFADIAZINE (SILVADENE) 1 % CREAM    Apply to BL lower leg ulcers daily    TORSEMIDE (DEMADEX) 100 MG TABLET    Take 1 tablet by mouth 2 times daily       ALLERGIES     Amitriptyline; Celecoxib; Cymbalta [duloxetine hcl];  Elavil [amitriptyline hcl]; Gabapentin; and Nsaids    FAMILY HISTORY       Family History   Problem Relation Age of Onset    Heart Disease Mother     Heart Disease Father     Cancer Father     Diabetes Brother     Diabetes Brother           SOCIAL HISTORY       Social History     Socioeconomic History    Marital status: Single     Spouse name: Not on file    Number of children: Not on file    Years of education: Not on file    Highest education level: Not on file   Occupational History    Not on file   Social Needs    Financial resource strain: Not on file    Food insecurity:     Worry: Not on file     Inability: Not on file    Transportation needs:     Medical: Not on file     Non-medical: Not on file   Tobacco Use    Smoking status: Former Smoker     Packs/day: 1.00     Years: 16.00     Pack years: 16.00     Last attempt to quit: 1/10/1988     Years since quittin.5    Smokeless tobacco: Never Used    Tobacco comment: 22 yrs ago   Substance and Sexual Activity    Alcohol use: No     Alcohol/week: 0.0 standard drinks    Drug use: No    Sexual activity: Not on file   Lifestyle    Physical activity:     Days per week: Not on file     Minutes per session: Not on file    Stress: Not on file   Relationships    Social connections:     Talks on phone: Not on file     Gets together: Not on file     Attends [employer] service: Not on file     Active fracture. Patient is able to ambulate in the emergency department even prior to pain medication and after being given additional pain medication he reports substantial improvement in his symptoms and ability to ambulate. There is no signs of cauda equina, cord compression, or emergent symptoms at this time. Patient has no urinary retention is able to urinate normally. I do not suspect acute lumbar fracture, central nervous system compression or acute infection at this time. I have discussed the possibility of increased dizziness and increased fall risk with oxycodone and Flexeril and therefore the patient is given both of these medications in the emergency department, monitored for over 2 hours, and ambulated after taking the medication. He denies any lightheadedness, tremors, or difficulty ambulating. Patient does have 2 friends with him that are present during this conversation and we have discussed the importance of never driving on these medications, taking it very slow, acknowledging that he has an elevated risk for falls because of these medications, and taking special precautions to avoid falls. He understands that there is an increased risk of falls due to the fact that he is on his medications and states that he is okay with this risk due to the increased pain control. He will follow-up with Dr. Flor Musa as an outpatient to discuss possible brace or additional treatments for his height loss and back pain. The patient expresses understanding and agreement with this plan and is discharged home with strict strict ER return precautions. I estimate there is low risk for Abdominal aortic aneurysm, cauda equina syndrome, epidural mass lesion, thus I consider the discharge disposition reasonable. We have discussed the diagnosis and risks, and we agree with discharging home to follow-up with their primary doctor.  We also discussed returning to the Emergency Department immediately if new or worsening

## 2019-08-06 ENCOUNTER — APPOINTMENT (OUTPATIENT)
Dept: GENERAL RADIOLOGY | Age: 64
End: 2019-08-06
Payer: MEDICARE

## 2019-08-06 ENCOUNTER — HOSPITAL ENCOUNTER (EMERGENCY)
Age: 64
Discharge: HOME | End: 2019-08-07
Attending: EMERGENCY MEDICINE
Payer: MEDICARE

## 2019-08-06 DIAGNOSIS — K59.03 CONSTIPATION DUE TO OPIOID THERAPY: Primary | ICD-10-CM

## 2019-08-06 DIAGNOSIS — T40.2X5A CONSTIPATION DUE TO OPIOID THERAPY: Primary | ICD-10-CM

## 2019-08-06 DIAGNOSIS — K64.4 EXTERNAL HEMORRHOID: ICD-10-CM

## 2019-08-06 PROCEDURE — 96372 THER/PROPH/DIAG INJ SC/IM: CPT

## 2019-08-06 PROCEDURE — 74018 RADEX ABDOMEN 1 VIEW: CPT

## 2019-08-06 PROCEDURE — 99283 EMERGENCY DEPT VISIT LOW MDM: CPT

## 2019-08-06 PROCEDURE — 6360000002 HC RX W HCPCS: Performed by: NURSE PRACTITIONER

## 2019-08-06 RX ADMIN — METHYLNALTREXONE BROMIDE 12 MG: 12 INJECTION, SOLUTION SUBCUTANEOUS at 23:50

## 2019-08-06 ASSESSMENT — ENCOUNTER SYMPTOMS
VOMITING: 0
BACK PAIN: 0
DIARRHEA: 0
ALLERGIC/IMMUNOLOGIC NEGATIVE: 1
ABDOMINAL DISTENTION: 0
SHORTNESS OF BREATH: 0
EYES NEGATIVE: 1
ABDOMINAL PAIN: 0
BLOOD IN STOOL: 0
WHEEZING: 0
CONSTIPATION: 1
COUGH: 0
NAUSEA: 0

## 2019-08-06 ASSESSMENT — PAIN DESCRIPTION - LOCATION: LOCATION: BACK

## 2019-08-06 ASSESSMENT — PAIN SCALES - GENERAL: PAINLEVEL_OUTOF10: 9

## 2019-08-07 VITALS
HEIGHT: 68 IN | WEIGHT: 280 LBS | HEART RATE: 72 BPM | DIASTOLIC BLOOD PRESSURE: 65 MMHG | BODY MASS INDEX: 42.44 KG/M2 | TEMPERATURE: 98 F | RESPIRATION RATE: 18 BRPM | SYSTOLIC BLOOD PRESSURE: 130 MMHG | OXYGEN SATURATION: 98 %

## 2019-08-07 PROCEDURE — 6370000000 HC RX 637 (ALT 250 FOR IP): Performed by: NURSE PRACTITIONER

## 2019-08-07 RX ORDER — PREGABALIN 25 MG/1
50 CAPSULE ORAL ONCE
Status: COMPLETED | OUTPATIENT
Start: 2019-08-07 | End: 2019-08-07

## 2019-08-07 RX ADMIN — PREGABALIN 50 MG: 25 CAPSULE ORAL at 00:57

## 2019-08-07 ASSESSMENT — ENCOUNTER SYMPTOMS
EYES NEGATIVE: 1
BACK PAIN: 0
VOMITING: 0
ABDOMINAL DISTENTION: 0
WHEEZING: 0
COUGH: 0
BLOOD IN STOOL: 0
DIARRHEA: 0
CONSTIPATION: 1
ALLERGIC/IMMUNOLOGIC NEGATIVE: 1
ABDOMINAL PAIN: 0
NAUSEA: 0
SHORTNESS OF BREATH: 0

## 2019-08-07 ASSESSMENT — PAIN DESCRIPTION - LOCATION: LOCATION: BACK

## 2019-08-07 ASSESSMENT — PAIN DESCRIPTION - PAIN TYPE: TYPE: ACUTE PAIN

## 2019-08-07 ASSESSMENT — PAIN SCALES - GENERAL: PAINLEVEL_OUTOF10: 6

## 2019-08-07 NOTE — ED PROVIDER NOTES
Negative. Neurological: Negative for dizziness, seizures, syncope, speech difficulty, weakness, light-headedness, numbness and headaches. Hematological: Negative. Psychiatric/Behavioral: Negative. Positives and Pertinent negatives as per HPI. Except as noted abovein the ROS, all other systems were reviewed and negative. PAST MEDICAL HISTORY     Past Medical History:   Diagnosis Date    Anemia     Angina     Arthritis     CAD (coronary artery disease)     [facility]     CKD (chronic kidney disease) stage 3, GFR 30-59 ml/min ([facility]     Clostridium difficile diarrhea 3/16/12; 2/29/12    positive stool toxin    Diabetes mellitus ([address]     Diabetic neuropathy ([address]     feet and legs    Disease of blood and blood forming organ     GERD (gastroesophageal reflux disease)     gastric ulcer    Hyperlipidemia     Hypertension     Kidney stone 2002    Refusal of blood transfusions as patient is Congregational     Sleep apnea     Venous ulcer ([facility] [address]     LLE    Wound, open 1/13/2012         SURGICAL HISTORY     Past Surgical History:   Procedure Laterality Date    CARDIAC SURGERY      Dec 27 2010, triple bypass    CHOLECYSTECTOMY  9/2011    HERNIA REPAIR  age1    OTHER SURGICAL HISTORY  11-16-11 REPAIR LOWER STERNAL INCISION, REMOVAL OF ONE STERNAL WIRE,    OTHER SURGICAL HISTORY  10/10/2014    phacoemulsification of cataract with intraocular lens implant left eye    UPPER GASTROINTESTINAL ENDOSCOPY           CURRENTMEDICATIONS       Previous Medications    ALLOPURINOL (ZYLOPRIM) 100 MG TABLET    Take 300 mg by mouth     ASPIRIN 81 MG CHEWABLE TABLET    Take 1 tablet by mouth daily.     ATORVASTATIN (LIPITOR) 40 MG TABLET    Take 1 tablet by mouth daily    CARVEDILOL (COREG) 25 MG TABLET    Take 1 tablet by mouth 2 times daily (with meals)    COLCHICINE (COLCRYS) 0.6 MG TABLET    Take 1 tablet by mouth daily    COMPRESSION STOCKINGS MISC    by Does not apply

## 2019-08-07 NOTE — ED NOTES
--Patient provided with discharge instructions and any prescriptions. --Instructions, dosing, and follow-up appointments reviewed with patient/family. No further questions or needs at this time. --Vital signs and patient stable upon discharge. --Patient ambulatory to [facility].        Kamla Elaine RN  08/07/19 0720

## 2019-09-03 ENCOUNTER — OFFICE VISIT (OUTPATIENT)
Dept: PULMONOLOGY | Age: 64
End: 2019-09-03
Payer: MEDICARE

## 2019-09-03 VITALS
RESPIRATION RATE: 16 BRPM | BODY MASS INDEX: 43.19 KG/M2 | SYSTOLIC BLOOD PRESSURE: 136 MMHG | OXYGEN SATURATION: 96 % | TEMPERATURE: 98.7 F | HEART RATE: 84 BPM | DIASTOLIC BLOOD PRESSURE: 64 MMHG | HEIGHT: 68 IN | WEIGHT: 285 LBS

## 2019-09-03 DIAGNOSIS — G47.33 SEVERE OBSTRUCTIVE SLEEP APNEA: Primary | ICD-10-CM

## 2019-09-03 DIAGNOSIS — Z72.821 POOR SLEEP HYGIENE: ICD-10-CM

## 2019-09-03 DIAGNOSIS — Z71.89 ENCOUNTER FOR BIPAP USE COUNSELING: ICD-10-CM

## 2019-09-03 PROCEDURE — G8427 DOCREV CUR MEDS BY ELIG CLIN: HCPCS | Performed by: NURSE PRACTITIONER

## 2019-09-03 PROCEDURE — 1123F ACP DISCUSS/DSCN MKR DOCD: CPT | Performed by: NURSE PRACTITIONER

## 2019-09-03 PROCEDURE — 1036F TOBACCO NON-USER: CPT | Performed by: NURSE PRACTITIONER

## 2019-09-03 PROCEDURE — 99213 OFFICE O/P EST LOW 20 MIN: CPT | Performed by: NURSE PRACTITIONER

## 2019-09-03 PROCEDURE — 4040F PNEUMOC VAC/ADMIN/RCVD: CPT | Performed by: NURSE PRACTITIONER

## 2019-09-03 PROCEDURE — G8599 NO ASA/ANTIPLAT THER USE RNG: HCPCS | Performed by: NURSE PRACTITIONER

## 2019-09-03 PROCEDURE — G8417 CALC BMI ABV UP PARAM F/U: HCPCS | Performed by: NURSE PRACTITIONER

## 2019-09-03 PROCEDURE — 3017F COLORECTAL CA SCREEN DOC REV: CPT | Performed by: NURSE PRACTITIONER

## 2019-09-03 ASSESSMENT — SLEEP AND FATIGUE QUESTIONNAIRES
NECK CIRCUMFERENCE (INCHES): 20.5
ESS TOTAL SCORE: 10
HOW LIKELY ARE YOU TO NOD OFF OR FALL ASLEEP WHILE SITTING INACTIVE IN A PUBLIC PLACE: 0
HOW LIKELY ARE YOU TO NOD OFF OR FALL ASLEEP WHILE LYING DOWN TO REST IN THE AFTERNOON WHEN CIRCUMSTANCES PERMIT: 3
HOW LIKELY ARE YOU TO NOD OFF OR FALL ASLEEP WHILE SITTING AND READING: 2
HOW LIKELY ARE YOU TO NOD OFF OR FALL ASLEEP WHILE SITTING QUIETLY AFTER LUNCH WITHOUT ALCOHOL: 2
HOW LIKELY ARE YOU TO NOD OFF OR FALL ASLEEP WHILE SITTING AND TALKING TO SOMEONE: 0
HOW LIKELY ARE YOU TO NOD OFF OR FALL ASLEEP WHILE WATCHING TV: 3
HOW LIKELY ARE YOU TO NOD OFF OR FALL ASLEEP IN A CAR, WHILE STOPPED FOR A FEW MINUTES IN TRAFFIC: 0
HOW LIKELY ARE YOU TO NOD OFF OR FALL ASLEEP WHEN YOU ARE A PASSENGER IN A CAR FOR AN HOUR WITHOUT A BREAK: 0

## 2019-09-03 NOTE — PROGRESS NOTES
, Rfl:     fluticasone (FLONASE) 50 MCG/ACT nasal spray, 1 spray by Nasal route nightly as needed. , Disp: , Rfl:     aspirin 81 MG chewable tablet, Take 1 tablet by mouth daily. , Disp: 30 tablet, Rfl:     isosorbide mononitrate (IMDUR) 30 MG CR tablet, Take 1 tablet by mouth daily. , Disp: 30 tablet, Rfl: 0    ferrous sulfate 325 (65 FE) MG tablet, Take 325 mg by mouth 3 times daily (with meals). , Disp: , Rfl:     omeprazole (PRILOSEC) 20 MG capsule, Take 20 mg by mouth 2 times daily. , Disp: , Rfl:       Objective:   PHYSICAL EXAM:    /64 (Site: Left Lower Arm, Position: Sitting)   Pulse 84   Temp 98.7 °F (37.1 °C) (Oral)   Resp 16   Ht 5' 8\" (1.727 m)   Wt 285 lb (129.3 kg)   SpO2 96%   BMI 43.33 kg/m²     Physical exam:  Gen: No acute distress. Obese. BMI of 43.33  Eyes: PERRL. No sclera icterus. No conjunctival injection. ENT: No discharge. Pharynx clear. Mallampati class IV. Neck: Trachea midline. No obvious mass. Neck circumference 20.5\"  Resp: No accessory muscle use. No crackles. No wheezes. No rhonchi. CV: Regular rate. Regular rhythm. No murmur or rub. Skin: Warm and dry. M/S: No cyanosis. Uses cane  Neuro: Awake. Alert. Moves all four extremities. Psych: Oriented x 3. No anxiety. DATA:   9/28 17 PSG AHI 35.4/REM AHI 45.7, PLMS 38, low SPO2 73%  11/16/17 titration-controlled sleep-related breathing with BiPAP, PLMS 86.8 recommendations BiPAP 14/8 cm H2O    BIPAP compliance data:  Compliance download report from 3/4/18 to 4/2/18 reviewed today by me and showed patient is using machine 3:23 hrs/night with 30% compliance and AHI 0.8 within this time frame. 9/30days with greater than 4 hours of machine use. 30/30days with any BIPAP use  BIPAP 14/8 cm H20    Compliance download report from 4/17/18 to 5/16/18 showed patient is using machine 5:27 hrs/night with 63.3% compliance and AHI 5.1 within this time frame. 19/30days with greater than 4 hours of machine use.   BIPAP 14/8 cm

## 2019-09-03 NOTE — PATIENT INSTRUCTIONS
your ability to fall asleep. Regular exercise is recommended to help you deepen the sleep. 3- Avoid heavy, spicy, or sugary foods 4-6 hours before bedtime: These can affect your ability to stay asleep. 4- Have a light snack before bed: Having an empty stomach can interfere with your sleep. Dairy products and turkey contain tryptophan, which acts as a natural sleep inducer. 5- Stay away from caffeine, nicotine and alcohol at least 4-6 hours before bed: Caffeine and nicotine are stimulants that interfere with your ability to fall asleep. While alcohol has an immediate sleep-inducing effect, a few hours later, as alcohol levels in your blood start to fall, there is a stimulant effect and you will experience fragmented sleep. 6- Take a hot bath 90 minutes before bedtime:  A hot bath will raise your body temperature, but it is the drop in body temperature that may leave you feeling sleepy  7- Develop sleep rituals: it is important to give your body cues that it is time to slow down and sleep. Listen to relaxing music, read something soothing for 15 minutes, have a cup of caffeine free tea, or do relaxation exercises such as yoga or deep breathing help relieve anxiety and reduce muscle tension. 8- Fix a bedtime and an awakening time: Even on weekends! When your sleep cycle has a regular rhythm, you will feel better. 9- Sleep only when sleepy: This reduces the time you are awake in bed. 10- Get into your favorite sleeping position: If you can't fall asleep within 15-30 minutes, get up and do something boring until you feel sleepy. Sit quietly in the dark or read the warranty on your refrigerator. Don't expose yourself to bright light while you are up, it gives cues to your brain that it is time to wake up. 11- Only use your bed for sleeping: Dont use the bed as an office, workroom or recreation room. Let your body \"know\" that the bed is associated with sleeping  12- Use comfortable bedding.  Uncomfortable

## 2019-09-06 ENCOUNTER — HOSPITAL ENCOUNTER (OUTPATIENT)
Age: 64
Setting detail: SPECIMEN
Discharge: HOME | End: 2019-09-06
Payer: MEDICARE

## 2019-09-06 LAB
ALBUMIN SERPL-MCNC: 4.5 G/DL (ref 3.4–5)
ANION GAP SERPL CALCULATED.3IONS-SCNC: 17 MMOL/L (ref 3–16)
BUN BLDV-MCNC: 112 MG/DL (ref 7–20)
CALCIUM SERPL-MCNC: 9.4 MG/DL (ref 8.3–10.6)
CHLORIDE BLD-SCNC: 92 MMOL/L (ref 99–110)
CO2: 26 MMOL/L (ref 21–32)
CREAT SERPL-MCNC: 2.2 MG/DL (ref 0.8–1.3)
GFR AFRICAN AMERICAN: 36
GFR NON-AFRICAN AMERICAN: 30
GLUCOSE BLD-MCNC: 144 MG/DL (ref 70–99)
MAGNESIUM: 2 MG/DL (ref 1.8–2.4)
PHOSPHORUS: 5.5 MG/DL (ref 2.5–4.9)
POTASSIUM SERPL-SCNC: 4.2 MMOL/L (ref 3.5–5.1)
SODIUM BLD-SCNC: 135 MMOL/L (ref 136–145)

## 2019-09-06 PROCEDURE — 83735 ASSAY OF MAGNESIUM: CPT

## 2019-09-06 PROCEDURE — 80069 RENAL FUNCTION PANEL: CPT

## 2019-09-06 PROCEDURE — 83036 HEMOGLOBIN GLYCOSYLATED A1C: CPT

## 2019-09-06 PROCEDURE — 36415 COLL VENOUS BLD VENIPUNCTURE: CPT

## 2019-09-07 LAB
ESTIMATED AVERAGE GLUCOSE: 145.6 MG/DL
HBA1C MFR BLD: 6.7 %

## 2019-09-13 ENCOUNTER — HOSPITAL ENCOUNTER (OUTPATIENT)
Age: 64
Setting detail: SPECIMEN
Discharge: HOME | End: 2019-09-13
Payer: MEDICARE

## 2019-09-13 LAB
ANION GAP SERPL CALCULATED.3IONS-SCNC: 19 MMOL/L (ref 3–16)
BUN BLDV-MCNC: 100 MG/DL (ref 7–20)
CALCIUM SERPL-MCNC: 9.5 MG/DL (ref 8.3–10.6)
CHLORIDE BLD-SCNC: 97 MMOL/L (ref 99–110)
CO2: 20 MMOL/L (ref 21–32)
CREAT SERPL-MCNC: 1.8 MG/DL (ref 0.8–1.3)
GFR AFRICAN AMERICAN: 46
GFR NON-AFRICAN AMERICAN: 38
GLUCOSE BLD-MCNC: 185 MG/DL (ref 70–99)
MAGNESIUM: 1.7 MG/DL (ref 1.8–2.4)
POTASSIUM SERPL-SCNC: 4.7 MMOL/L (ref 3.5–5.1)
SODIUM BLD-SCNC: 136 MMOL/L (ref 136–145)

## 2019-09-13 PROCEDURE — 36415 COLL VENOUS BLD VENIPUNCTURE: CPT

## 2019-09-13 PROCEDURE — 80048 BASIC METABOLIC PNL TOTAL CA: CPT

## 2019-09-13 PROCEDURE — 83735 ASSAY OF MAGNESIUM: CPT

## 2019-09-20 ENCOUNTER — HOSPITAL ENCOUNTER (OUTPATIENT)
Age: 64
Setting detail: SPECIMEN
Discharge: HOME | End: 2019-09-20
Payer: MEDICARE

## 2019-09-20 PROCEDURE — 36415 COLL VENOUS BLD VENIPUNCTURE: CPT

## 2019-09-20 PROCEDURE — 83036 HEMOGLOBIN GLYCOSYLATED A1C: CPT

## 2019-09-21 LAB
ESTIMATED AVERAGE GLUCOSE: 139.9 MG/DL
HBA1C MFR BLD: 6.5 %

## 2019-09-27 ENCOUNTER — HOSPITAL ENCOUNTER (OUTPATIENT)
Age: 64
Discharge: HOME | End: 2019-09-27
Payer: MEDICARE

## 2019-09-27 LAB
ANION GAP SERPL CALCULATED.3IONS-SCNC: 19 MMOL/L (ref 3–16)
BUN BLDV-MCNC: 82 MG/DL (ref 7–20)
CALCIUM SERPL-MCNC: 9.7 MG/DL (ref 8.3–10.6)
CHLORIDE BLD-SCNC: 97 MMOL/L (ref 99–110)
CO2: 20 MMOL/L (ref 21–32)
CREAT SERPL-MCNC: 1.5 MG/DL (ref 0.8–1.3)
GFR AFRICAN AMERICAN: 57
GFR NON-AFRICAN AMERICAN: 47
GLUCOSE BLD-MCNC: 186 MG/DL (ref 70–99)
MAGNESIUM: 1.8 MG/DL (ref 1.8–2.4)
POTASSIUM SERPL-SCNC: 5.6 MMOL/L (ref 3.5–5.1)
SODIUM BLD-SCNC: 136 MMOL/L (ref 136–145)

## 2019-09-27 PROCEDURE — 80048 BASIC METABOLIC PNL TOTAL CA: CPT

## 2019-09-27 PROCEDURE — 36415 COLL VENOUS BLD VENIPUNCTURE: CPT

## 2019-09-27 PROCEDURE — 83735 ASSAY OF MAGNESIUM: CPT

## 2019-10-04 ENCOUNTER — HOSPITAL ENCOUNTER (OUTPATIENT)
Age: 64
Setting detail: SPECIMEN
Discharge: HOME | End: 2019-10-04
Payer: MEDICARE

## 2019-10-04 LAB
ALBUMIN SERPL-MCNC: 4.8 G/DL (ref 3.4–5)
ANION GAP SERPL CALCULATED.3IONS-SCNC: 15 MMOL/L (ref 3–16)
BUN BLDV-MCNC: 85 MG/DL (ref 7–20)
CALCIUM SERPL-MCNC: 10.1 MG/DL (ref 8.3–10.6)
CHLORIDE BLD-SCNC: 93 MMOL/L (ref 99–110)
CO2: 26 MMOL/L (ref 21–32)
CREAT SERPL-MCNC: 1.8 MG/DL (ref 0.8–1.3)
GFR AFRICAN AMERICAN: 46
GFR NON-AFRICAN AMERICAN: 38
GLUCOSE BLD-MCNC: 160 MG/DL (ref 70–99)
MAGNESIUM: 2.1 MG/DL (ref 1.8–2.4)
PHOSPHORUS: 5 MG/DL (ref 2.5–4.9)
POTASSIUM SERPL-SCNC: 5.1 MMOL/L (ref 3.5–5.1)
SODIUM BLD-SCNC: 134 MMOL/L (ref 136–145)

## 2019-10-04 PROCEDURE — 80069 RENAL FUNCTION PANEL: CPT

## 2019-10-04 PROCEDURE — 36415 COLL VENOUS BLD VENIPUNCTURE: CPT

## 2019-10-04 PROCEDURE — 83735 ASSAY OF MAGNESIUM: CPT

## 2019-10-11 ENCOUNTER — HOSPITAL ENCOUNTER (OUTPATIENT)
Age: 64
Discharge: HOME | End: 2019-10-11
Payer: MEDICARE

## 2019-10-11 LAB
ANION GAP SERPL CALCULATED.3IONS-SCNC: 16 MMOL/L (ref 3–16)
BUN BLDV-MCNC: 103 MG/DL (ref 7–20)
CALCIUM SERPL-MCNC: 10.2 MG/DL (ref 8.3–10.6)
CHLORIDE BLD-SCNC: 95 MMOL/L (ref 99–110)
CO2: 24 MMOL/L (ref 21–32)
CREAT SERPL-MCNC: 1.9 MG/DL (ref 0.8–1.3)
GFR AFRICAN AMERICAN: 43
GFR NON-AFRICAN AMERICAN: 36
GLUCOSE BLD-MCNC: 210 MG/DL (ref 70–99)
MAGNESIUM: 2 MG/DL (ref 1.8–2.4)
POTASSIUM SERPL-SCNC: 6 MMOL/L (ref 3.5–5.1)
SODIUM BLD-SCNC: 135 MMOL/L (ref 136–145)

## 2019-10-11 PROCEDURE — 80048 BASIC METABOLIC PNL TOTAL CA: CPT

## 2019-10-11 PROCEDURE — 36415 COLL VENOUS BLD VENIPUNCTURE: CPT

## 2019-10-11 PROCEDURE — 83735 ASSAY OF MAGNESIUM: CPT

## 2019-10-17 ENCOUNTER — HOSPITAL ENCOUNTER (OUTPATIENT)
Age: 64
Setting detail: SPECIMEN
Discharge: HOME | End: 2019-10-17
Payer: MEDICARE

## 2019-10-17 LAB
ALBUMIN SERPL-MCNC: 4.7 G/DL (ref 3.4–5)
ANION GAP SERPL CALCULATED.3IONS-SCNC: 14 MMOL/L (ref 3–16)
BUN BLDV-MCNC: 98 MG/DL (ref 7–20)
CALCIUM SERPL-MCNC: 9.9 MG/DL (ref 8.3–10.6)
CHLORIDE BLD-SCNC: 91 MMOL/L (ref 99–110)
CO2: 27 MMOL/L (ref 21–32)
CREAT SERPL-MCNC: 1.7 MG/DL (ref 0.8–1.3)
GFR AFRICAN AMERICAN: 49
GFR NON-AFRICAN AMERICAN: 41
GLUCOSE BLD-MCNC: 196 MG/DL (ref 70–99)
MAGNESIUM: 1.9 MG/DL (ref 1.8–2.4)
PHOSPHORUS: 3.7 MG/DL (ref 2.5–4.9)
POTASSIUM SERPL-SCNC: 4.7 MMOL/L (ref 3.5–5.1)
SODIUM BLD-SCNC: 132 MMOL/L (ref 136–145)

## 2019-10-17 PROCEDURE — 36415 COLL VENOUS BLD VENIPUNCTURE: CPT

## 2019-10-17 PROCEDURE — 83735 ASSAY OF MAGNESIUM: CPT

## 2019-10-17 PROCEDURE — 80069 RENAL FUNCTION PANEL: CPT

## 2019-10-24 ENCOUNTER — HOSPITAL ENCOUNTER (OUTPATIENT)
Age: 64
Setting detail: SPECIMEN
Discharge: HOME | End: 2019-10-24
Payer: MEDICARE

## 2019-10-24 LAB
ALBUMIN SERPL-MCNC: 4.5 G/DL (ref 3.4–5)
ANION GAP SERPL CALCULATED.3IONS-SCNC: 16 MMOL/L (ref 3–16)
BUN BLDV-MCNC: 131 MG/DL (ref 7–20)
CALCIUM SERPL-MCNC: 9.8 MG/DL (ref 8.3–10.6)
CHLORIDE BLD-SCNC: 90 MMOL/L (ref 99–110)
CO2: 25 MMOL/L (ref 21–32)
CREAT SERPL-MCNC: 2.2 MG/DL (ref 0.8–1.3)
GFR AFRICAN AMERICAN: 36
GFR NON-AFRICAN AMERICAN: 30
GLUCOSE BLD-MCNC: 223 MG/DL (ref 70–99)
MAGNESIUM: 1.9 MG/DL (ref 1.8–2.4)
PHOSPHORUS: 5 MG/DL (ref 2.5–4.9)
POTASSIUM SERPL-SCNC: 4.5 MMOL/L (ref 3.5–5.1)
SODIUM BLD-SCNC: 131 MMOL/L (ref 136–145)

## 2019-10-24 PROCEDURE — 83735 ASSAY OF MAGNESIUM: CPT

## 2019-10-24 PROCEDURE — 36415 COLL VENOUS BLD VENIPUNCTURE: CPT

## 2019-10-24 PROCEDURE — 80069 RENAL FUNCTION PANEL: CPT

## 2019-10-30 ENCOUNTER — HOSPITAL ENCOUNTER (OUTPATIENT)
Age: 64
Setting detail: SPECIMEN
Discharge: HOME | End: 2019-10-30
Payer: MEDICARE

## 2019-10-30 LAB
ALBUMIN SERPL-MCNC: 4.3 G/DL (ref 3.4–5)
ANION GAP SERPL CALCULATED.3IONS-SCNC: 14 MMOL/L (ref 3–16)
BUN BLDV-MCNC: 98 MG/DL (ref 7–20)
CALCIUM SERPL-MCNC: 9.9 MG/DL (ref 8.3–10.6)
CHLORIDE BLD-SCNC: 91 MMOL/L (ref 99–110)
CO2: 29 MMOL/L (ref 21–32)
CREAT SERPL-MCNC: 1.9 MG/DL (ref 0.8–1.3)
GFR AFRICAN AMERICAN: 43
GFR NON-AFRICAN AMERICAN: 36
GLUCOSE BLD-MCNC: 128 MG/DL (ref 70–99)
MAGNESIUM: 1.9 MG/DL (ref 1.8–2.4)
PHOSPHORUS: 4.5 MG/DL (ref 2.5–4.9)
POTASSIUM SERPL-SCNC: 4.7 MMOL/L (ref 3.5–5.1)
SODIUM BLD-SCNC: 134 MMOL/L (ref 136–145)

## 2019-10-30 PROCEDURE — 83735 ASSAY OF MAGNESIUM: CPT

## 2019-10-30 PROCEDURE — 36415 COLL VENOUS BLD VENIPUNCTURE: CPT

## 2019-10-30 PROCEDURE — 80069 RENAL FUNCTION PANEL: CPT

## 2019-11-08 ENCOUNTER — HOSPITAL ENCOUNTER (OUTPATIENT)
Age: 64
Setting detail: SPECIMEN
Discharge: HOME | End: 2019-11-08
Payer: MEDICARE

## 2019-11-08 PROCEDURE — 80048 BASIC METABOLIC PNL TOTAL CA: CPT

## 2019-11-08 PROCEDURE — 36415 COLL VENOUS BLD VENIPUNCTURE: CPT

## 2019-11-08 PROCEDURE — 83735 ASSAY OF MAGNESIUM: CPT

## 2019-11-09 LAB
ANION GAP SERPL CALCULATED.3IONS-SCNC: 16 MMOL/L (ref 3–16)
BUN BLDV-MCNC: 80 MG/DL (ref 7–20)
CALCIUM SERPL-MCNC: 9.2 MG/DL (ref 8.3–10.6)
CHLORIDE BLD-SCNC: 98 MMOL/L (ref 99–110)
CO2: 23 MMOL/L (ref 21–32)
CREAT SERPL-MCNC: 1.4 MG/DL (ref 0.8–1.3)
GFR AFRICAN AMERICAN: >60
GFR NON-AFRICAN AMERICAN: 51
GLUCOSE BLD-MCNC: 146 MG/DL (ref 70–99)
MAGNESIUM: 1.9 MG/DL (ref 1.8–2.4)
POTASSIUM SERPL-SCNC: 5.2 MMOL/L (ref 3.5–5.1)
SODIUM BLD-SCNC: 137 MMOL/L (ref 136–145)

## 2019-11-13 ENCOUNTER — HOSPITAL ENCOUNTER (OUTPATIENT)
Age: 64
Setting detail: SPECIMEN
Discharge: HOME | End: 2019-11-13
Payer: MEDICARE

## 2019-11-13 LAB
ALBUMIN SERPL-MCNC: 4.1 G/DL (ref 3.4–5)
ANION GAP SERPL CALCULATED.3IONS-SCNC: 14 MMOL/L (ref 3–16)
BUN BLDV-MCNC: 84 MG/DL (ref 7–20)
CALCIUM SERPL-MCNC: 9.7 MG/DL (ref 8.3–10.6)
CHLORIDE BLD-SCNC: 95 MMOL/L (ref 99–110)
CO2: 22 MMOL/L (ref 21–32)
CREAT SERPL-MCNC: 1.5 MG/DL (ref 0.8–1.3)
GFR AFRICAN AMERICAN: 57
GFR NON-AFRICAN AMERICAN: 47
GLUCOSE BLD-MCNC: 232 MG/DL (ref 70–99)
MAGNESIUM: 1.7 MG/DL (ref 1.8–2.4)
PHOSPHORUS: 3.1 MG/DL (ref 2.5–4.9)
POTASSIUM SERPL-SCNC: 5.2 MMOL/L (ref 3.5–5.1)
SODIUM BLD-SCNC: 131 MMOL/L (ref 136–145)

## 2019-11-13 PROCEDURE — 36415 COLL VENOUS BLD VENIPUNCTURE: CPT

## 2019-11-13 PROCEDURE — 80069 RENAL FUNCTION PANEL: CPT

## 2019-11-13 PROCEDURE — 83735 ASSAY OF MAGNESIUM: CPT

## 2019-11-21 ENCOUNTER — HOSPITAL ENCOUNTER (OUTPATIENT)
Age: 64
Setting detail: SPECIMEN
Discharge: HOME | End: 2019-11-21
Payer: MEDICARE

## 2019-11-21 LAB
ALBUMIN SERPL-MCNC: 4.2 G/DL (ref 3.4–5)
ANION GAP SERPL CALCULATED.3IONS-SCNC: 13 MMOL/L (ref 3–16)
BUN BLDV-MCNC: 85 MG/DL (ref 7–20)
CALCIUM SERPL-MCNC: 9.9 MG/DL (ref 8.3–10.6)
CHLORIDE BLD-SCNC: 94 MMOL/L (ref 99–110)
CO2: 24 MMOL/L (ref 21–32)
CREAT SERPL-MCNC: 1.4 MG/DL (ref 0.8–1.3)
GFR AFRICAN AMERICAN: >60
GFR NON-AFRICAN AMERICAN: 51
GLUCOSE BLD-MCNC: 263 MG/DL (ref 70–99)
MAGNESIUM: 1.9 MG/DL (ref 1.8–2.4)
PHOSPHORUS: 3.5 MG/DL (ref 2.5–4.9)
POTASSIUM SERPL-SCNC: 5.5 MMOL/L (ref 3.5–5.1)
SODIUM BLD-SCNC: 131 MMOL/L (ref 136–145)

## 2019-11-21 PROCEDURE — 80069 RENAL FUNCTION PANEL: CPT

## 2019-11-21 PROCEDURE — 36415 COLL VENOUS BLD VENIPUNCTURE: CPT

## 2019-11-21 PROCEDURE — 83735 ASSAY OF MAGNESIUM: CPT

## 2019-11-29 ENCOUNTER — HOSPITAL ENCOUNTER (OUTPATIENT)
Age: 64
Discharge: HOME | End: 2019-11-29
Payer: MEDICARE

## 2019-11-29 LAB
ALBUMIN SERPL-MCNC: 4.2 G/DL (ref 3.4–5)
ANION GAP SERPL CALCULATED.3IONS-SCNC: 13 MMOL/L (ref 3–16)
BUN BLDV-MCNC: 93 MG/DL (ref 7–20)
CALCIUM SERPL-MCNC: 9.9 MG/DL (ref 8.3–10.6)
CHLORIDE BLD-SCNC: 94 MMOL/L (ref 99–110)
CO2: 26 MMOL/L (ref 21–32)
CREAT SERPL-MCNC: 1.7 MG/DL (ref 0.8–1.3)
GFR AFRICAN AMERICAN: 49
GFR NON-AFRICAN AMERICAN: 41
GLUCOSE BLD-MCNC: 221 MG/DL (ref 70–99)
MAGNESIUM: 2.1 MG/DL (ref 1.8–2.4)
PHOSPHORUS: 3.8 MG/DL (ref 2.5–4.9)
POTASSIUM SERPL-SCNC: 5.1 MMOL/L (ref 3.5–5.1)
SODIUM BLD-SCNC: 133 MMOL/L (ref 136–145)

## 2019-11-29 PROCEDURE — 80069 RENAL FUNCTION PANEL: CPT

## 2019-11-29 PROCEDURE — 83735 ASSAY OF MAGNESIUM: CPT

## 2019-11-29 PROCEDURE — 36415 COLL VENOUS BLD VENIPUNCTURE: CPT

## 2019-12-05 ENCOUNTER — HOSPITAL ENCOUNTER (OUTPATIENT)
Age: 64
Setting detail: SPECIMEN
Discharge: HOME | End: 2019-12-05
Payer: MEDICARE

## 2019-12-05 LAB
ALBUMIN SERPL-MCNC: 4.4 G/DL (ref 3.4–5)
ANION GAP SERPL CALCULATED.3IONS-SCNC: 15 MMOL/L (ref 3–16)
BUN BLDV-MCNC: 77 MG/DL (ref 7–20)
CALCIUM SERPL-MCNC: 9.9 MG/DL (ref 8.3–10.6)
CHLORIDE BLD-SCNC: 94 MMOL/L (ref 99–110)
CO2: 24 MMOL/L (ref 21–32)
CREAT SERPL-MCNC: 1.3 MG/DL (ref 0.8–1.3)
GFR AFRICAN AMERICAN: >60
GFR NON-AFRICAN AMERICAN: 55
GLUCOSE BLD-MCNC: 214 MG/DL (ref 70–99)
MAGNESIUM: 2 MG/DL (ref 1.8–2.4)
PHOSPHORUS: 3.5 MG/DL (ref 2.5–4.9)
POTASSIUM SERPL-SCNC: 4.9 MMOL/L (ref 3.5–5.1)
SODIUM BLD-SCNC: 133 MMOL/L (ref 136–145)

## 2019-12-05 PROCEDURE — 83735 ASSAY OF MAGNESIUM: CPT

## 2019-12-05 PROCEDURE — 80069 RENAL FUNCTION PANEL: CPT

## 2019-12-05 PROCEDURE — 36415 COLL VENOUS BLD VENIPUNCTURE: CPT

## 2019-12-12 ENCOUNTER — HOSPITAL ENCOUNTER (OUTPATIENT)
Age: 64
Setting detail: SPECIMEN
Discharge: HOME | End: 2019-12-12
Payer: MEDICARE

## 2019-12-12 LAB
ALBUMIN SERPL-MCNC: 4.5 G/DL (ref 3.4–5)
ANION GAP SERPL CALCULATED.3IONS-SCNC: 15 MMOL/L (ref 3–16)
BUN BLDV-MCNC: 92 MG/DL (ref 7–20)
CALCIUM SERPL-MCNC: 9.9 MG/DL (ref 8.3–10.6)
CHLORIDE BLD-SCNC: 95 MMOL/L (ref 99–110)
CO2: 27 MMOL/L (ref 21–32)
CREAT SERPL-MCNC: 1.5 MG/DL (ref 0.8–1.3)
GFR AFRICAN AMERICAN: 57
GFR NON-AFRICAN AMERICAN: 47
GLUCOSE BLD-MCNC: 244 MG/DL (ref 70–99)
MAGNESIUM: 2 MG/DL (ref 1.8–2.4)
PHOSPHORUS: 3.6 MG/DL (ref 2.5–4.9)
POTASSIUM SERPL-SCNC: 4.3 MMOL/L (ref 3.5–5.1)
SODIUM BLD-SCNC: 137 MMOL/L (ref 136–145)

## 2019-12-12 PROCEDURE — 80069 RENAL FUNCTION PANEL: CPT

## 2019-12-12 PROCEDURE — 83735 ASSAY OF MAGNESIUM: CPT

## 2019-12-12 PROCEDURE — 36415 COLL VENOUS BLD VENIPUNCTURE: CPT

## 2019-12-19 ENCOUNTER — HOSPITAL ENCOUNTER (OUTPATIENT)
Age: 64
Setting detail: SPECIMEN
Discharge: HOME | End: 2019-12-19
Payer: MEDICARE

## 2019-12-19 LAB
ALBUMIN SERPL-MCNC: 4.1 G/DL (ref 3.4–5)
ANION GAP SERPL CALCULATED.3IONS-SCNC: 16 MMOL/L (ref 3–16)
BUN BLDV-MCNC: 107 MG/DL (ref 7–20)
CALCIUM SERPL-MCNC: 9.1 MG/DL (ref 8.3–10.6)
CHLORIDE BLD-SCNC: 91 MMOL/L (ref 99–110)
CO2: 23 MMOL/L (ref 21–32)
CREAT SERPL-MCNC: 1.5 MG/DL (ref 0.8–1.3)
GFR AFRICAN AMERICAN: 57
GFR NON-AFRICAN AMERICAN: 47
GLUCOSE BLD-MCNC: 305 MG/DL (ref 70–99)
MAGNESIUM: 2 MG/DL (ref 1.8–2.4)
PHOSPHORUS: 3.9 MG/DL (ref 2.5–4.9)
POTASSIUM SERPL-SCNC: 4.9 MMOL/L (ref 3.5–5.1)
SODIUM BLD-SCNC: 130 MMOL/L (ref 136–145)

## 2019-12-19 PROCEDURE — 83735 ASSAY OF MAGNESIUM: CPT

## 2019-12-19 PROCEDURE — 80069 RENAL FUNCTION PANEL: CPT

## 2019-12-19 PROCEDURE — 36415 COLL VENOUS BLD VENIPUNCTURE: CPT

## 2019-12-24 ENCOUNTER — HOSPITAL ENCOUNTER (OUTPATIENT)
Age: 64
Discharge: HOME | End: 2019-12-24
Payer: MEDICARE

## 2019-12-24 LAB
ALBUMIN SERPL-MCNC: 4.3 G/DL (ref 3.4–5)
ANION GAP SERPL CALCULATED.3IONS-SCNC: 13 MMOL/L (ref 3–16)
BUN BLDV-MCNC: 100 MG/DL (ref 7–20)
CALCIUM SERPL-MCNC: 9.6 MG/DL (ref 8.3–10.6)
CHLORIDE BLD-SCNC: 93 MMOL/L (ref 99–110)
CO2: 26 MMOL/L (ref 21–32)
CREAT SERPL-MCNC: 1.6 MG/DL (ref 0.8–1.3)
GFR AFRICAN AMERICAN: 53
GFR NON-AFRICAN AMERICAN: 43
GLUCOSE BLD-MCNC: 141 MG/DL (ref 70–99)
MAGNESIUM: 2 MG/DL (ref 1.8–2.4)
PHOSPHORUS: 3.8 MG/DL (ref 2.5–4.9)
POTASSIUM SERPL-SCNC: 4.2 MMOL/L (ref 3.5–5.1)
SODIUM BLD-SCNC: 132 MMOL/L (ref 136–145)

## 2019-12-24 PROCEDURE — 83735 ASSAY OF MAGNESIUM: CPT

## 2019-12-24 PROCEDURE — 36415 COLL VENOUS BLD VENIPUNCTURE: CPT

## 2019-12-24 PROCEDURE — 80069 RENAL FUNCTION PANEL: CPT

## 2020-03-02 ENCOUNTER — TELEPHONE (OUTPATIENT)
Dept: PULMONOLOGY | Age: 65
End: 2020-03-02

## 2020-03-02 NOTE — TELEPHONE ENCOUNTER
Patient cancelled follow up appt with Liborio Quiroz CNP. Patient will have to call back to R/S when patient secures transportation.

## 2020-04-01 ENCOUNTER — TELEPHONE (OUTPATIENT)
Dept: PULMONOLOGY | Age: 65
End: 2020-04-01

## 2020-04-08 ENCOUNTER — VIRTUAL VISIT (OUTPATIENT)
Dept: PULMONOLOGY | Age: 65
End: 2020-04-08
Payer: MEDICARE

## 2020-04-08 PROCEDURE — G2012 BRIEF CHECK IN BY MD/QHP: HCPCS | Performed by: NURSE PRACTITIONER

## 2020-04-08 ASSESSMENT — SLEEP AND FATIGUE QUESTIONNAIRES
HOW LIKELY ARE YOU TO NOD OFF OR FALL ASLEEP WHILE SITTING AND TALKING TO SOMEONE: 0
HOW LIKELY ARE YOU TO NOD OFF OR FALL ASLEEP WHILE LYING DOWN TO REST IN THE AFTERNOON WHEN CIRCUMSTANCES PERMIT: 3
HOW LIKELY ARE YOU TO NOD OFF OR FALL ASLEEP WHILE WATCHING TV: 2
HOW LIKELY ARE YOU TO NOD OFF OR FALL ASLEEP IN A CAR, WHILE STOPPED FOR A FEW MINUTES IN TRAFFIC: 2
HOW LIKELY ARE YOU TO NOD OFF OR FALL ASLEEP WHILE SITTING QUIETLY AFTER LUNCH WITHOUT ALCOHOL: 2
HOW LIKELY ARE YOU TO NOD OFF OR FALL ASLEEP WHILE SITTING INACTIVE IN A PUBLIC PLACE: 0
HOW LIKELY ARE YOU TO NOD OFF OR FALL ASLEEP WHILE WATCHING TV: 2
ESS TOTAL SCORE: 11
HOW LIKELY ARE YOU TO NOD OFF OR FALL ASLEEP WHEN YOU ARE A PASSENGER IN A CAR FOR AN HOUR WITHOUT A BREAK: 0
HOW LIKELY ARE YOU TO NOD OFF OR FALL ASLEEP WHEN YOU ARE A PASSENGER IN A CAR FOR AN HOUR WITHOUT A BREAK: 0
HOW LIKELY ARE YOU TO NOD OFF OR FALL ASLEEP WHILE LYING DOWN TO REST IN THE AFTERNOON WHEN CIRCUMSTANCES PERMIT: 3
HOW LIKELY ARE YOU TO NOD OFF OR FALL ASLEEP IN A CAR, WHILE STOPPED FOR A FEW MINUTES IN TRAFFIC: 0
ESS TOTAL SCORE: 9
HOW LIKELY ARE YOU TO NOD OFF OR FALL ASLEEP WHILE SITTING AND TALKING TO SOMEONE: 0
HOW LIKELY ARE YOU TO NOD OFF OR FALL ASLEEP WHILE SITTING QUIETLY AFTER LUNCH WITHOUT ALCOHOL: 2
HOW LIKELY ARE YOU TO NOD OFF OR FALL ASLEEP WHILE SITTING AND READING: 2
HOW LIKELY ARE YOU TO NOD OFF OR FALL ASLEEP WHILE SITTING AND READING: 2
HOW LIKELY ARE YOU TO NOD OFF OR FALL ASLEEP WHILE SITTING INACTIVE IN A PUBLIC PLACE: 0

## 2020-04-08 NOTE — PROGRESS NOTES
Medicine 4/8/2020 4/8/2020 9/3/2019 4/2/2019 7/11/2018 5/17/2018 4/3/2018   Sitting and reading 2 2 2 3 2 2 3   Watching TV 2 2 3 3 2 2 3   Sitting, inactive in a public place (e.g. a theatre or a meeting) 0 0 0 0 1 2 0   As a passenger in a car for an hour without a break 0 0 0 0 0 0 3   Lying down to rest in the afternoon when circumstances permit 3 3 3 3 3 3 3   Sitting and talking to someone 0 0 0 0 0 0 0   Sitting quietly after a lunch without alcohol 2 2 2 2 2 2 3   In a car, while stopped for a few minutes in traffic 0 2 0 0 0 0 0   Total score 9 11 10 11 10 11 15   Neck circumference - - 20.5 20.5 20.5 20.25 20.5       Past Medical History:  Past Medical History:   Diagnosis Date    Anemia     Angina     Arthritis     CAD (coronary artery disease)     CHF (congestive heart failure) (formerly [facility])     CKD (chronic kidney disease) stage 3, GFR 30-59 ml/min (formerly [facility])     Clostridium difficile diarrhea 3/16/12; 2/29/12    positive stool toxin    Diabetes mellitus ([address]     Diabetic neuropathy (formerly [facility])     feet and legs    Disease of blood and blood forming organ     GERD (gastroesophageal reflux disease)     gastric ulcer    Hyperlipidemia     Hypertension     Kidney stone 2002    Refusal of blood transfusions as patient is Yarsanism     Sleep apnea     Venous ulcer ([address]     LLE    Wound, open 1/13/2012       Past Surgical History:        Procedure Laterality Date    CARDIAC SURGERY      Dec 27 2010, triple bypass    CHOLECYSTECTOMY  9/2011    HERNIA REPAIR  age1    OTHER SURGICAL HISTORY  11-16-11 REPAIR LOWER STERNAL INCISION, REMOVAL OF ONE STERNAL WIRE,    OTHER SURGICAL HISTORY  10/10/2014    phacoemulsification of cataract with intraocular lens implant left eye    UPPER GASTROINTESTINAL ENDOSCOPY         Allergies:  is allergic to amitriptyline; celecoxib; cymbalta [duloxetine hcl]; elavil [amitriptyline hcl]; gabapentin; and nsaids.   Social History:    TOBACCO:   reports that he quit UNIT/ML injection vial, Inject 55 Units into the skin 2 times daily (Patient taking differently: Inject 80 Units into the skin 2 times daily ), Disp: 1 vial, Rfl: 1    Compression Stockings MISC, by Does not apply route 2 pair, knee high compression stockings, fitted/ zippered, Disp: 2 each, Rfl: 1    pregabalin (LYRICA) 50 MG capsule, Take 1 capsule by mouth Daily with supper (Patient taking differently: Take 25 mg by mouth Daily with supper ), Disp: 60 capsule, Rfl: 3    silver sulfADIAZINE (SILVADENE) 1 % cream, Apply to BL lower leg ulcers daily, Disp: 20 g, Rfl: 0    carvedilol (COREG) 25 MG tablet, Take 1 tablet by mouth 2 times daily (with meals), Disp: 180 tablet, Rfl: 3    nitroGLYCERIN (NITROSTAT) 0.4 MG SL tablet, Place 1 tablet under the tongue every 5 minutes as needed, Disp: 25 tablet, Rfl: 1    polyethylene glycol (MIRALAX) packet, Take 17 g by mouth daily. , Disp: , Rfl:     fluticasone (FLONASE) 50 MCG/ACT nasal spray, 1 spray by Nasal route nightly as needed. , Disp: , Rfl:     aspirin 81 MG chewable tablet, Take 1 tablet by mouth daily. , Disp: 30 tablet, Rfl:     isosorbide mononitrate (IMDUR) 30 MG CR tablet, Take 1 tablet by mouth daily. , Disp: 30 tablet, Rfl: 0    ferrous sulfate 325 (65 FE) MG tablet, Take 325 mg by mouth 3 times daily (with meals). , Disp: , Rfl:     omeprazole (PRILOSEC) 20 MG capsule, Take 20 mg by mouth 2 times daily. , Disp: , Rfl:       Objective:   PHYSICAL EXAM:    There were no vitals taken for this visit. DATA:   9/28 17 PSG AHI 35.4/REM AHI 45.7, PLMS 38, low SPO2 73%  11/16/17 titration-controlled sleep-related breathing with BiPAP, PLMS 86.8 recommendations BiPAP 14/8 cm H2O    BIPAP compliance data:  Compliance download report from 3/4/18 to 4/2/18 reviewed today by me and showed patient is using machine 3:23 hrs/night with 30% compliance and AHI 0.8 within this time frame. 9/30days with greater than 4 hours of machine use. 30/30days with any BIPAP use  BIPAP 14/8 cm H20    Compliance download report from 4/17/18 to 5/16/18 showed patient is using machine 5:27 hrs/night with 63.3% compliance and AHI 5.1 within this time frame. 19/30days with greater than 4 hours of machine use. BIPAP 14/8 cm H20    Compliance download report from 6/11/18 to 7/10/18 showed patient is using machine 6:42 hrs/night with 100% compliance and AHI 1.4 within this time frame. 30/30days with greater than 4 hours of machine use. BIPAP 14/8 cm H20    Compliance download report from 3/2/19 to 3/31/19 showed patient is using machine 14 min/night with 0% compliance and AHI 5.5 within this time frame. 0/30days with greater than 4 hours of machine use. BIPAP 14/8 cm H20    Compliance download report from 8/4/19 to 9/2/19 showed patient is using machine 2:47 hrs/night with 10% compliance and AHI 1.1 within this time frame. 3/30days with greater than 4 hours of machine use. 29/30 days with any BIPAP use. BIPAP 14/8 cm H20    Compliance download report from 3/4/20 to 4/3/20 reviewed today by me and showed patient is using machine 4:58 hrs/night with 74.2% compliance and AHI 1.2 within this time frame. 21/31 days with greater than 4 hours of machine use. BIPAP 14/8 cm H20    Assessment:      Severe HENNA. BIPAP 14/8 cm H2O  Optimal compliance on review today. · Hypersomnia- sedating medications, poor sleep hygiene, co morbid conditions, likely contributing-improving. · Irregular sleep pattern due to pain  · Poor sleep hygiene  · Obesity   · Cardiomyopathy, CAD s/p CABG x3, pulmonary HTN, neuropathy, DM, HTN  · Chronic pain- followed by pain management.     Plan:      -Continue BiPAP 14/8 cm [id number] to increase use  -Discussed severity of sleep apnea and importance of BiPAP use  -airfit P10 large currently- can change masks if needed  -Supply order to DME of patient's choice  -Chin strap if not using full face mask  - Advised to use BiPAP 6-8 hrs at night and during

## 2020-04-08 NOTE — PROGRESS NOTES
Orders verbalized by Maryanne Brittle CNP;  1 yr  Orders faxed to DME: List mailed to pt.  Will call back

## 2020-04-08 NOTE — PATIENT INSTRUCTIONS
.Please keep all of your future appointments scheduled by [facility]. Out of respect for other patients and providers, you may be asked to reschedule your appointment if you arrive later than your scheduled appointment time. Appointments cancelled less than 24hrs in advance will be considered a no show. Patients with three missed appointments within 1 year or four missed appointments within 2 years can be dismissed from the practice. You may receive a survey regarding the care you received during your visit. Your input is valuable to us. We encourage you to complete and return your survey. We hope you will choose us in the future for your healthcare needs. Remember to bring your sleep machine, cord and mask to each appointment    You should have a follow up appointment scheduled with a sleep medicine provider within 12 months. Routine parts, masks, tubing and filters should all be obtainable from your DME (equipment) provider. Any problems related to mask fit, comfort or functioning of equipment should also be addressed directly with your DME provider. If you are unable to resolve problems, then please notify our office and schedule an appointment to be seen sooner than the usual follow up appointment. For all patients who are evaluated for sleep disorders, never drive or operate motorized equipment while sleepy, fatigued or groggy. Please bring in all of your sleep equipment to all of your appointments, including machine, mask, cords and hoses. Here are some tips to to getting better sleep  1- Avoid napping during the day: This will ensure you are tired at bedtime. If you have to take a nap, sleep less than one hour, before 3 pm.   2- Exercise regularly, but not right before bed: but the timing of the workout is important. Exercising in the morning or early afternoon will not interfere with sleep.   Exercising within two hours before bedtime can decrease bedding can prevent good sleep. Evaluate whether or not this is a source of your problem, and make appropriate changes. 13- Make sure your bed and bedroom are quiet and comfortable: A hot room can be uncomfortable. A cooler room, along with enough blankets to stay warm is recommended. Get a blackout shade or wear a slumber mask and wear earplugs or get a \"white noise\" machine for light and noise distractions. 14- Use sunlight to set your biological clock: When you get up in the morning, go outside and turn your face to the sun for 15 minutes. 13- Dont take your worries to bed: Leave worries about job, school, daily life, etc., behind when you go to bed. Some people find it useful to assign a \"worry period\" during the evening or afternoon for these issues. CPAP Equipment Cleaning and Disinfecting Schedule  Equipment Cleaning Frequency Instructions  Disinfecting Frequency   Non-Disposable Filters  Weekly Mild soapy water, Rinse, Air Dry Not Required   Disposable Filters Change as needed  2-4 weeks Do Not Wash Not Required   Hose/tubing Daily Mild soapy water, Rinse, Air Dry Once a week   Mask / Nasal Pillows Daily Mild soapy water, Rinse, Air Dry Once a week   Headgear Weekly Hand wash, Mild soapy water, Rinse, Dry  Not Required   Humidifier Daily Empty water daily  Mild soapy water, Rinse well, Air Dry  Once a week   CPAP Unit As Needed Dust with damp cloth,  No detergents or sprays Not Required         Disinfect (per schedule) with 1 part white vinegar and 3 parts water- soak mask and water chamber for 30 minutes every 1-2 weeks, more often if sick. Allow water/vinegar mixture to run through tubing. Allow all equipment to air dry. Drying Hints:   Always hang tubing away from direct sunlight, as this will cause the tubing to become yellow, brittle and crack over a period of time. DO NOT attach the wet tubing to your CPAP unit to blow-dry it. The moisture from the tubing can drain back into your machine. Moisture in your unit can cause sudden pressure increases or short circuits  DO's and DON'Ts:  - Don't use alcohol-based products to clean your mask, because it can cause the materials to become hard and brittle. - Don't put headgear in the washer or dryer  - Don't use any caustic or household cleaning solutions such as bleach on your CPAP   equipment.  - Do follow the recommended cleaning schedule. - Do change your disposable filter frequently. Adapted From: [email or web address]. shtm.   These are general suggestions for all models please follow specific s recommendations and specific instructions

## 2020-09-17 ENCOUNTER — HOSPITAL ENCOUNTER (EMERGENCY)
Age: 65
Discharge: HOME | End: 2020-09-17
Attending: EMERGENCY MEDICINE
Payer: MEDICARE

## 2020-09-17 ENCOUNTER — APPOINTMENT (OUTPATIENT)
Dept: CT IMAGING | Age: 65
End: 2020-09-17
Payer: MEDICARE

## 2020-09-17 VITALS
SYSTOLIC BLOOD PRESSURE: 120 MMHG | BODY MASS INDEX: 43.69 KG/M2 | HEIGHT: 69 IN | RESPIRATION RATE: 17 BRPM | WEIGHT: 295 LBS | DIASTOLIC BLOOD PRESSURE: 52 MMHG | TEMPERATURE: 97.7 F | OXYGEN SATURATION: 94 % | HEART RATE: 88 BPM

## 2020-09-17 LAB
ANION GAP SERPL CALCULATED.3IONS-SCNC: 11 MMOL/L (ref 3–16)
BASOPHILS ABSOLUTE: 0.1 K/UL (ref 0–0.2)
BASOPHILS RELATIVE PERCENT: 0.6 %
BUN BLDV-MCNC: 57 MG/DL (ref 7–20)
CALCIUM SERPL-MCNC: 10 MG/DL (ref 8.3–10.6)
CHLORIDE BLD-SCNC: 96 MMOL/L (ref 99–110)
CO2: 27 MMOL/L (ref 21–32)
CREAT SERPL-MCNC: 1.6 MG/DL (ref 0.8–1.3)
EOSINOPHILS ABSOLUTE: 0.5 K/UL (ref 0–0.6)
EOSINOPHILS RELATIVE PERCENT: 4.9 %
GFR AFRICAN AMERICAN: 53
GFR NON-AFRICAN AMERICAN: 43
GLUCOSE BLD-MCNC: 170 MG/DL (ref 70–99)
HCT VFR BLD CALC: 37.8 % (ref 40.5–52.5)
HEMOGLOBIN: 12.7 G/DL (ref 13.5–17.5)
LACTIC ACID: 0.7 MMOL/L (ref 0.4–2)
LYMPHOCYTES ABSOLUTE: 1.1 K/UL (ref 1–5.1)
LYMPHOCYTES RELATIVE PERCENT: 10.7 %
MCH RBC QN AUTO: 29.4 PG (ref 26–34)
MCHC RBC AUTO-ENTMCNC: 33.5 G/DL (ref 31–36)
MCV RBC AUTO: 87.8 FL (ref 80–100)
MONOCYTES ABSOLUTE: 0.7 K/UL (ref 0–1.3)
MONOCYTES RELATIVE PERCENT: 6.9 %
NEUTROPHILS ABSOLUTE: 7.6 K/UL (ref 1.7–7.7)
NEUTROPHILS RELATIVE PERCENT: 76.9 %
PDW BLD-RTO: 17.6 % (ref 12.4–15.4)
PLATELET # BLD: 193 K/UL (ref 135–450)
PMV BLD AUTO: 8.8 FL (ref 5–10.5)
POTASSIUM REFLEX MAGNESIUM: 4.3 MMOL/L (ref 3.5–5.1)
RBC # BLD: 4.31 M/UL (ref 4.2–5.9)
SODIUM BLD-SCNC: 134 MMOL/L (ref 136–145)
WBC # BLD: 9.8 K/UL (ref 4–11)

## 2020-09-17 PROCEDURE — 83605 ASSAY OF LACTIC ACID: CPT

## 2020-09-17 PROCEDURE — 2580000003 HC RX 258: Performed by: NURSE PRACTITIONER

## 2020-09-17 PROCEDURE — 6360000002 HC RX W HCPCS: Performed by: NURSE PRACTITIONER

## 2020-09-17 PROCEDURE — 99283 EMERGENCY DEPT VISIT LOW MDM: CPT

## 2020-09-17 PROCEDURE — 70450 CT HEAD/BRAIN W/O DYE: CPT

## 2020-09-17 PROCEDURE — 96365 THER/PROPH/DIAG IV INF INIT: CPT

## 2020-09-17 PROCEDURE — 80048 BASIC METABOLIC PNL TOTAL CA: CPT

## 2020-09-17 PROCEDURE — 85025 COMPLETE CBC W/AUTO DIFF WBC: CPT

## 2020-09-17 RX ORDER — ACETAMINOPHEN 500 MG
1000 TABLET ORAL 4 TIMES DAILY PRN
Qty: 60 TABLET | Refills: 0 | Status: ON HOLD | OUTPATIENT
Start: 2020-09-17 | End: 2022-10-23 | Stop reason: CLARIF

## 2020-09-17 RX ORDER — AMOXICILLIN AND CLAVULANATE POTASSIUM 875; 125 MG/1; MG/1
1 TABLET, FILM COATED ORAL 2 TIMES DAILY
Qty: 20 TABLET | Refills: 0 | Status: SHIPPED | OUTPATIENT
Start: 2020-09-17 | End: 2020-09-27

## 2020-09-17 RX ADMIN — CEFTRIAXONE SODIUM 2 G: 2 INJECTION, POWDER, FOR SOLUTION INTRAMUSCULAR; INTRAVENOUS at 19:40

## 2020-09-17 ASSESSMENT — PAIN DESCRIPTION - ORIENTATION: ORIENTATION: LEFT

## 2020-09-17 ASSESSMENT — PAIN DESCRIPTION - LOCATION: LOCATION: FACE

## 2020-09-17 ASSESSMENT — PAIN SCALES - GENERAL: PAINLEVEL_OUTOF10: 7

## 2020-09-17 NOTE — ED PROVIDER NOTES
I independently performed a history and physical on [facility]. All diagnostic, treatment, and disposition decisions were made by myself in conjunction with the advanced practice provider. Briefly, this is a 76 y.o. male here for left ear pain and decreased hearing. Ongoing for 1 month. Progressively worsening. Refractory to eardrops as well as Augmentin prescribed by his primary care doctor. Not having fevers at this time. Symptoms moderate intensity. Has headache. On exam,   General: Patient is in no acute distress  Skin: No cyanosis  HEENT: Moist mucous membranes. Cannot visualize left TM possibly is ruptured. There is copious white/yellow appearing drainage from the ear. Mastoid does not appear erythematous on left side. No percussion tenderness. Heart: Regular rate, regular rhythm  Lung: No respiratory distress  Abdomen: Soft, nontender  Neuro: Moving all extremities, no facial droop, no slurred speech, answers questions appropriately        Screenings            MDM  Patient is a 70-year-old man who presents with left ear pain. Obtained CT which showed possible evidence of mastoiditis. Given treatment failure as an outpatient as well as mastoiditis which typically does not respond to outpatient antibiotics, will consult ENT for possible transfer    ENT was consulted. They are arranging f/u tomorrow and advised augmentin and ciprodex. Patient is tolerating oral intake, pain controlled, and seems reliable. Objectively they appear to be in no acute distress. Discharge instructions, follow up instructions, and return precautions were discussed with the patient and any available family. All questions were answered.     Patient Referrals:  Loyd Parnell MD  [address]  [phone]    Call in 1 day  Call after 8 AM (when they open) to be seen tomorrow    [facility]  [address]  Go to   As needed, If symptoms worsen      Discharge Medications:  Discharge Medication List as of 9/17/2020  7:58 PM      START taking these medications    Details   amoxicillin-clavulanate (AUGMENTIN) 875-125 MG per tablet Take 1 tablet by mouth 2 times daily for 10 days, Disp-20 tablet,R-0Print      ciprofloxacin-dexamethasone (CIPRODEX) 0.3-0.1 % otic suspension Place 4 drops in ear(s) 2 times daily for 10 days, Disp-1 Bottle,R-0Print      acetaminophen (TYLENOL) 500 MG tablet Take 2 tablets by mouth 4 times daily as needed for Pain, Disp-60 tablet,R-0Print             FINAL IMPRESSION  1. Acute otitis media, unspecified otitis media type    2. Mastoiditis of left side        Blood pressure (!) 120/52, pulse 88, temperature 97.7 °F (36.5 °C), temperature source Oral, resp. rate 17, height 5' 9\" (1.753 m), weight 295 lb (133.8 kg), SpO2 94 %.      For further details of [facility] emergency department encounter, please see documentation by advanced practice provider, Demetrius Sweet MD  09/17/20 [id number]

## 2020-09-17 NOTE — ED NOTES
Fall risk screening completed.  Fall risk bracelet applied to patient.  Non-skid socks provided and placed on patient. Sanjuana Newman fall risk is indicated using  dome light .  Based on score, a bed alarm was indicated and applied.  The call light is within the patient's reach, and instructions for use were provided.  The bed is in the lowest position with wheels locked.  The patient has been advised to notify staff, using the call light, if there is a need to get up or use restroom.  The patient verbalized understanding of safety precautions and how to contact staff for assistance.          Narcisa Landry RN  09/17/20 [id number]

## 2020-09-17 NOTE — ED PROVIDER NOTES
[address] [place] [address]  ED  800 [facility] [id number]  Dept: [phone]  Dept Fax: [phone]  Loc: [facility]        This patient was seen and evaluated per myself in conjunction with ED attending Dr. Luiza Kamara. CHIEF COMPLAINT    Chief Complaint   Patient presents with    Facial Pain     treated for left ear infection a month ago, c/o left sided facial pain/tingling - has appt with ENT on 10/8       [facility]    Quentin Bassett is a 76 y.o. male who presents with ear pain, localized on the left side. Onset was a month ago, states that it lessened after a course of Augmentin but did not completely go away. States that he continued to have ear drainage and pain even after the course of Augmentin was placed on eardrops. He states that the pain never again completely subsided and was given a referral for ENT. He has an appointment but it is not until 10/8/2020. He denies any fevers or chills. States that the left ear pain does have associated drainage, is yellowish in nature. Does have an associated headache with this on the left side only. The duration has been intermittent since the onset. No aggravating factors. States that he came to the ED for further evaluation and treatment.     REVIEW OF SYSTEMS    Pulmonary: No difficulty breathing   General: No fevers  GI: No vomiting  ENT: see HPI    PAST MEDICAL AND SURGICAL HISTORY    Past Medical History:   Diagnosis Date    Anemia     Angina     Arthritis     CAD (coronary artery disease)     CHF (congestive heart failure) ([facility])     CKD (chronic kidney disease) stage 3, GFR 30-59 ml/min (formerly [facility])     Clostridium difficile diarrhea 3/16/12; 2/29/12    positive stool toxin    Diabetes mellitus ([facility] [address]     Diabetic neuropathy ([facility])     feet and legs    Disease of blood and blood forming organ     GERD (gastroesophageal reflux disease)     gastric ulcer    Hyperlipidemia     Hypertension     Kidney stone 2002    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     Sleep apnea     Venous ulcer ([facility] [address]     LLE    Wound, open 1/13/2012     Past Surgical History:   Procedure Laterality Date    CARDIAC SURGERY      Dec 27 2010, triple bypass    CHOLECYSTECTOMY  9/2011    HERNIA REPAIR  age2    OTHER SURGICAL HISTORY  11-16-11 REPAIR LOWER STERNAL INCISION, REMOVAL OF ONE STERNAL WIRE,    OTHER SURGICAL HISTORY  10/10/2014    phacoemulsification of cataract with intraocular lens implant left eye    UPPER GASTROINTESTINAL ENDOSCOPY         CURRENT MEDICATIONS  (may include discharge medications prescribed in the ED)  Current Outpatient Rx   Medication Sig Dispense Refill    amoxicillin-clavulanate (AUGMENTIN) 875-125 MG per tablet Take 1 tablet by mouth 2 times daily for 10 days 20 tablet 0    ciprofloxacin-dexamethasone (CIPRODEX) 0.3-0.1 % otic suspension Place 4 drops in ear(s) 2 times daily for 10 days 1 Bottle 0    acetaminophen (TYLENOL) 500 MG tablet Take 2 tablets by mouth 4 times daily as needed for Pain 60 tablet 0    oxyCODONE-acetaminophen (PERCOCET) 5-325 MG per tablet Take 1 tablet by mouth 4 times daily for 30 days. 120 tablet 0    hydrocortisone (ANUSOL-HC) 2.5 % rectal cream Place rectally 2 times daily.  1 Tube 0    senna (SENOKOT) 8.6 MG TABS tablet Take 1 tablet by mouth 2 times daily 120 tablet 0    naloxone 4 MG/0.1ML LIQD nasal spray 1 spray by Nasal route as needed for Opioid Reversal 1 each 0    colchicine (COLCRYS) 0.6 MG tablet Take 1 tablet by mouth daily 30 tablet 3    docusate sodium (COLACE, DULCOLAX) 100 MG CAPS Take 100 mg by mouth 2 times daily      metolazone (ZAROXOLYN) 5 MG tablet Take 1 tablet by mouth as needed (weight gain > 3#) 10 tablet 0    potassium chloride (KLOR-CON M) 20 MEQ TBCR extended release tablet Take 1 tablet by mouth 2 times daily 60 tablet 3    torsemide (DEMADEX) 100 MG tablet Take 1 tablet by mouth 2 times daily 30 tablet 3    insulin aspart (NOVOLOG) 100 UNIT/ML injection vial Inject into the skin 3 times daily (before meals) 50 units with breakfast, 20 units with lunch and 60 units with dinner      allopurinol (ZYLOPRIM) 100 MG tablet Take 300 mg by mouth       atorvastatin (LIPITOR) 40 MG tablet Take 1 tablet by mouth daily 90 tablet 3    insulin glargine (LANTUS) 100 UNIT/ML injection vial Inject 55 Units into the skin 2 times daily (Patient taking differently: Inject 80 Units into the skin 2 times daily ) 1 vial 1    Compression Stockings MISC by Does not apply route 2 pair, knee high compression stockings, fitted/ zippered 2 each 1    pregabalin (LYRICA) 50 MG capsule Take 1 capsule by mouth Daily with supper (Patient taking differently: Take 25 mg by mouth Daily with supper ) 60 capsule 3    silver sulfADIAZINE (SILVADENE) 1 % cream Apply to BL lower leg ulcers daily 20 g 0    carvedilol (COREG) 25 MG tablet Take 1 tablet by mouth 2 times daily (with meals) 180 tablet 3    nitroGLYCERIN (NITROSTAT) 0.4 MG SL tablet Place 1 tablet under the tongue every 5 minutes as needed 25 tablet 1    polyethylene glycol (MIRALAX) packet Take 17 g by mouth daily.  fluticasone (FLONASE) 50 MCG/ACT nasal spray 1 spray by Nasal route nightly as needed.  aspirin 81 MG chewable tablet Take 1 tablet by mouth daily. 30 tablet     isosorbide mononitrate (IMDUR) 30 MG CR tablet Take 1 tablet by mouth daily. 30 tablet 0    ferrous sulfate 325 (65 FE) MG tablet Take 325 mg by mouth 3 times daily (with meals).  omeprazole (PRILOSEC) 20 MG capsule Take 20 mg by mouth 2 times daily.          ALLERGIES    Allergies   Allergen Reactions    Amitriptyline Other (See Comments)     tremor    Celecoxib      Hyperkalemia    Cymbalta [Duloxetine Hcl] Other (See Comments)     Tremors and slurred speech    Elavil [Amitriptyline Hcl]      tremor    Gabapentin      Tremor at high dose    Nsaids      Gastric ulcer FAMILY AND SOCIAL HISTORY    Family History   Problem Relation Age of Onset    Heart Disease Mother     Heart Disease Father     Cancer Father     Diabetes Brother     Diabetes Brother      Social History     Socioeconomic History    Marital status: Single     Spouse name: None    Number of children: None    Years of education: None    Highest education level: None   Occupational History    None   Social Needs    Financial resource strain: None    Food insecurity     Worry: None     Inability: None    Transportation needs     Medical: None     Non-medical: None   Tobacco Use    Smoking status: Former Smoker     Packs/day: 1.00     Years: 16.00     Pack years: 16.00     Last attempt to quit: 1/10/1988     Years since quittin.7    Smokeless tobacco: Never Used    Tobacco comment: 22 yrs ago   Substance and Sexual Activity    Alcohol use: No     Alcohol/week: 0.0 standard drinks    Drug use: No    Sexual activity: None   Lifestyle    Physical activity     Days per week: None     Minutes per session: None    Stress: None   Relationships    Social connections     Talks on phone: None     Gets together: None     Attends Protestant service: None     Active member of club or organization: None     Attends meetings of clubs or organizations: None     Relationship status: None    Intimate partner violence     Fear of current or ex partner: None     Emotionally abused: None     Physically abused: None     Forced sexual activity: None   Other Topics Concern    None   Social History Narrative    None       PHYSICAL EXAM    VITAL SIGNS: BP (!) 120/52   Pulse 88   Temp 97.7 °F (36.5 °C) (Oral)   Resp 17   Ht 5' 9\" (1.753 m)   Wt 295 lb (133.8 kg)   SpO2 94%   BMI 43.56 kg/m²   Constitutional:  Well nourished, no acute distress  Eyes: Sclera nonicteric, conjunctiva normal   Throat/Face:  nonerythematous throat, no trismus  Ears:  The left ear canal appears erythematous and inflamed and the ipsilateral TM is unable to be visualized d/t yellow-white thick drainage in canal, mastoid and pinna are without redness or swelling, no mastoid tenderness   Respiratory:  No retractions  Cardiovascular:  No JVD, regular rate  Neurologic: Awake, alert, no slurred speech      LABS  Results for orders placed or performed during the hospital encounter of 09/17/20   Lactic Acid, Plasma   Result Value Ref Range    Lactic Acid 0.7 0.4 - 2.0 mmol/L   CBC Auto Differential   Result Value Ref Range    WBC 9.8 4.0 - 11.0 K/uL    RBC 4.31 4.20 - 5.90 M/uL    Hemoglobin 12.7 (L) 13.5 - 17.5 g/dL    Hematocrit 37.8 (L) 40.5 - 52.5 %    MCV 87.8 80.0 - 100.0 fL    MCH 29.4 26.0 - 34.0 pg    MCHC 33.5 31.0 - 36.0 g/dL    RDW 17.6 (H) 12.4 - 15.4 %    Platelets 355 671 - 531 K/uL    MPV 8.8 5.0 - 10.5 fL    Neutrophils % 76.9 %    Lymphocytes % 10.7 %    Monocytes % 6.9 %    Eosinophils % 4.9 %    Basophils % 0.6 %    Neutrophils Absolute 7.6 1.7 - 7.7 K/uL    Lymphocytes Absolute 1.1 1.0 - 5.1 K/uL    Monocytes Absolute 0.7 0.0 - 1.3 K/uL    Eosinophils Absolute 0.5 0.0 - 0.6 K/uL    Basophils Absolute 0.1 0.0 - 0.2 K/uL   Basic Metabolic Panel w/ Reflex to MG   Result Value Ref Range    Sodium 134 (L) 136 - 145 mmol/L    Potassium reflex Magnesium 4.3 3.5 - 5.1 mmol/L    Chloride 96 (L) 99 - 110 mmol/L    CO2 27 21 - 32 mmol/L    Anion Gap 11 3 - 16    Glucose 170 (H) 70 - 99 mg/dL    BUN 57 (H) 7 - 20 mg/dL    CREATININE 1.6 (H) 0.8 - 1.3 mg/dL    GFR Non- 43 (A) >60    GFR  53 (A) >60    Calcium 10.0 8.3 - 10.6 mg/dL         RADIOLOGY   CT Head WO Contrast   Final Result   Findings suspicious for a left otomastoiditis. No erosive changes noted. .      No intracranial abnormality identified. ED COURSE & MEDICAL DECISION MAKING    Please see the medical record for medications prescribed.     Differential diagnoses: Mastoiditis, Auricular cellulitis, Malignant otitis externa, Otitis media, Subarachnoid hemorrhage, Odontogenic infection, TMJ syndrome, eustachian tube dysfunction, other. Patient is afebrile and nontoxic in appearance. I was going to discharge the patient home with oral and antibiotic eardrops to have follow-up with ENT as scheduled. However he was very insistent on obtaining a CT, given that this is a new headache. I believe that the headache is mostly related due to his otitis media. However I did obtain a head CT, full radiology read as above. Given the results for the findings suspicious for a left otomastoiditis I did obtain blood work. He is afebrile and hemodynamically stable. However he does have a history of diabetes, and states that he has been on oral and antibiotic eardrops without any relief. Patient has no lactic acidosis or leukocytosis. Stable anemia. CKD is at his baseline, no severe electrolyte derangements noted. I did speak to Dr. Shukri Mayo with ENT. Given that patient does not have any mastoid pain, swelling or erythema he has a low suspicion that this is mastoiditis, believes that it is a refractory otitis media. He recommended the patient be placed back on oral Augmentin and antibiotic eardrops. He will contact the [facility] office to have the patient be seen tomorrow with 1 of his coworkers. I therefore spoke back with the patient regarding this, he is in agreement with the plan of discharge and follow-up tomorrow with ENT at their [facility] office. He is to call after 8 AM to be seen tomorrow. He is to return immediately for any new or worsening symptoms. He remained afebrile and hemodynamically stable throughout his entire ED course and will be discharged home in stable condition. The patient was instructed to follow up as an outpatient in 2 days. The patient was instructed to return to the ED immediately for any new or worsening symptoms. The patient verbalized understanding. FINAL IMPRESSION    1.  Acute otitis media, unspecified otitis media type    2.  Mastoiditis of left side        PLAN  Discharge with outpatient follow-up and discharge Instructions (see EMR)      (Please note that this note was completed with a voice recognition program.  Every attempt was made to edit the dictations, but inevitably there remain words that are mis-transcribed.)          PATRICK Hall - GILBERTO  09/17/20 2010

## 2020-09-18 ENCOUNTER — OFFICE VISIT (OUTPATIENT)
Dept: ENT CLINIC | Age: 65
End: 2020-09-18
Payer: MEDICARE

## 2020-09-18 VITALS
TEMPERATURE: 96.6 F | WEIGHT: 295 LBS | HEART RATE: 104 BPM | BODY MASS INDEX: 43.69 KG/M2 | HEIGHT: 69 IN | RESPIRATION RATE: 16 BRPM | DIASTOLIC BLOOD PRESSURE: 62 MMHG | SYSTOLIC BLOOD PRESSURE: 108 MMHG

## 2020-09-18 PROCEDURE — G8417 CALC BMI ABV UP PARAM F/U: HCPCS | Performed by: OTOLARYNGOLOGY

## 2020-09-18 PROCEDURE — 1036F TOBACCO NON-USER: CPT | Performed by: OTOLARYNGOLOGY

## 2020-09-18 PROCEDURE — 92504 EAR MICROSCOPY EXAMINATION: CPT | Performed by: OTOLARYNGOLOGY

## 2020-09-18 PROCEDURE — 4040F PNEUMOC VAC/ADMIN/RCVD: CPT | Performed by: OTOLARYNGOLOGY

## 2020-09-18 PROCEDURE — 1123F ACP DISCUSS/DSCN MKR DOCD: CPT | Performed by: OTOLARYNGOLOGY

## 2020-09-18 PROCEDURE — 99203 OFFICE O/P NEW LOW 30 MIN: CPT | Performed by: OTOLARYNGOLOGY

## 2020-09-18 PROCEDURE — 3017F COLORECTAL CA SCREEN DOC REV: CPT | Performed by: OTOLARYNGOLOGY

## 2020-09-18 PROCEDURE — G8427 DOCREV CUR MEDS BY ELIG CLIN: HCPCS | Performed by: OTOLARYNGOLOGY

## 2020-09-18 NOTE — PROGRESS NOTES
[address], [address], [facility] Lucio Anderson  P: [phone]       Patient     Quentin Bassett  1955    ChiefComplaint     Chief Complaint   Patient presents with    Ear Problem     Patient states that he has had infection in his left ear f0r 1.5 months, states his PCP has put him on ear drops, steroids, and oral ATBs. States that helped, but he was still having a lot of pain. States he called his PCP back who wrote him a new RX, which did not work. Patient states that after this newest medication did not work, his PCP told him to go see a specialist. States that he has a lot of pain and drainage in the ear. States that his PCP has been \"ducking him\" for the past several weeks    Tinnitus     States that he has ringing in both ears, and has had for several years       History of Present Illness     Juno Sousa is a 50-year-old male here today for evaluation of left acute otitis media with active otorrhea. He reports for 1 month left-sided ear pain. Was evaluated by PCP and started on eardrops without improvement. He was then given 2 different courses of oral antibiotics without improvement. Pain had worsened so therefore he presented to the ED yesterday where he had a CT of the head performed. Demonstrates partial opacification of left mastoid, complete opacification of left middle ear. There was no evidence of bony erosion. Denies previous ear surgery or previous ear infections. States hearing feels clogged on the left side. Does have history of longstanding bilateral tinnitus. Reports persistent headache for the last month that has largely remained unchanged. Treats with Excedrin Migraine. Denies confusion, light sensitivity, sound sensitivity or change in vision.     Past Medical History     Past Medical History:   Diagnosis Date    Anemia     Angina     Arthritis     CAD (coronary artery disease)     CHF (congestive heart failure) (HCC)     CKD (chronic kidney disease) stage 3, GFR 30-59 ml/min ([facility])     Clostridium difficile diarrhea 3/16/12; 12    positive stool toxin    Diabetes mellitus ([address]     Diabetic neuropathy ([address]     feet and legs    Disease of blood and blood forming organ     Dizziness     When he moves his head/ loses his balance    GERD (gastroesophageal reflux disease)     gastric ulcer    Headache     Hearing loss     Hyperlipidemia     Hypertension     Kidney stone     Refusal of blood transfusions as patient is Nondenominational     Sleep apnea     Tinnitus     Venous ulcer ([facility] [address]     LLE    Wound, open 2012       Past Surgical History     Past Surgical History:   Procedure Laterality Date    CARDIAC SURGERY      Dec 27 2010, triple bypass    CHOLECYSTECTOMY  2011    HERNIA REPAIR  age2    OTHER SURGICAL HISTORY  11 REPAIR LOWER STERNAL INCISION, REMOVAL OF ONE STERNAL WIRE,    OTHER SURGICAL HISTORY  10/10/2014    phacoemulsification of cataract with intraocular lens implant left eye    UPPER GASTROINTESTINAL ENDOSCOPY         Family History     Family History   Problem Relation Age of Onset    Heart Disease Mother     Heart Disease Father     Cancer Father     Diabetes Brother     Diabetes Brother        Social History     Social History     Tobacco Use    Smoking status: Former Smoker     Packs/day: 1.00     Years: 16.00     Pack years: 16.00     Last attempt to quit: 1/10/1988     Years since quittin.7    Smokeless tobacco: Never Used    Tobacco comment: 22 yrs ago   Substance Use Topics    Alcohol use: No     Alcohol/week: 0.0 standard drinks    Drug use: No        Allergies     Allergies   Allergen Reactions    Amitriptyline Other (See Comments)     tremor    Celecoxib      Hyperkalemia    Cymbalta [Duloxetine Hcl] Other (See Comments)     Tremors and slurred speech    Elavil [Amitriptyline Hcl]      tremor    Gabapentin      Tremor at high dose    Nsaids      Gastric ulcer Medications     Current Outpatient Medications   Medication Sig Dispense Refill    amoxicillin-clavulanate (AUGMENTIN) 875-125 MG per tablet Take 1 tablet by mouth 2 times daily for 10 days 20 tablet 0    ciprofloxacin-dexamethasone (CIPRODEX) 0.3-0.1 % otic suspension Place 4 drops in ear(s) 2 times daily for 10 days 1 Bottle 0    acetaminophen (TYLENOL) 500 MG tablet Take 2 tablets by mouth 4 times daily as needed for Pain 60 tablet 0    oxyCODONE-acetaminophen (PERCOCET) 5-325 MG per tablet Take 1 tablet by mouth 4 times daily for 30 days. 120 tablet 0    hydrocortisone (ANUSOL-HC) 2.5 % rectal cream Place rectally 2 times daily.  1 Tube 0    senna (SENOKOT) 8.6 MG TABS tablet Take 1 tablet by mouth 2 times daily 120 tablet 0    naloxone 4 MG/0.1ML LIQD nasal spray 1 spray by Nasal route as needed for Opioid Reversal 1 each 0    colchicine (COLCRYS) 0.6 MG tablet Take 1 tablet by mouth daily 30 tablet 3    docusate sodium (COLACE, DULCOLAX) 100 MG CAPS Take 100 mg by mouth 2 times daily      metolazone (ZAROXOLYN) 5 MG tablet Take 1 tablet by mouth as needed (weight gain > 3#) 10 tablet 0    torsemide (DEMADEX) 100 MG tablet Take 1 tablet by mouth 2 times daily 30 tablet 3    insulin aspart (NOVOLOG) 100 UNIT/ML injection vial Inject into the skin 3 times daily (before meals) 50 units with breakfast, 20 units with lunch and 60 units with dinner      allopurinol (ZYLOPRIM) 100 MG tablet Take 300 mg by mouth       atorvastatin (LIPITOR) 40 MG tablet Take 1 tablet by mouth daily 90 tablet 3    insulin glargine (LANTUS) 100 UNIT/ML injection vial Inject 55 Units into the skin 2 times daily (Patient taking differently: Inject 80 Units into the skin 2 times daily ) 1 vial 1    Compression Stockings MISC by Does not apply route 2 pair, knee high compression stockings, fitted/ zippered 2 each 1    silver sulfADIAZINE (SILVADENE) 1 % cream Apply to BL lower leg ulcers daily 20 g 0    carvedilol (COREG) 25 MG tablet Take 1 tablet by mouth 2 times daily (with meals) 180 tablet 3    nitroGLYCERIN (NITROSTAT) 0.4 MG SL tablet Place 1 tablet under the tongue every 5 minutes as needed 25 tablet 1    polyethylene glycol (MIRALAX) packet Take 17 g by mouth daily.  aspirin 81 MG chewable tablet Take 1 tablet by mouth daily. 30 tablet     isosorbide mononitrate (IMDUR) 30 MG CR tablet Take 1 tablet by mouth daily. 30 tablet 0    ferrous sulfate 325 (65 FE) MG tablet Take 325 mg by mouth 3 times daily (with meals).  potassium chloride (KLOR-CON M) 20 MEQ TBCR extended release tablet Take 1 tablet by mouth 2 times daily (Patient not taking: Reported on 9/18/2020) 60 tablet 3    pregabalin (LYRICA) 50 MG capsule Take 1 capsule by mouth Daily with supper (Patient not taking: Reported on 9/18/2020) 60 capsule 3    fluticasone (FLONASE) 50 MCG/ACT nasal spray 1 spray by Nasal route nightly as needed.  omeprazole (PRILOSEC) 20 MG capsule Take 20 mg by mouth 2 times daily. No current facility-administered medications for this visit. Review of Systems     Review of Systems      PhysicalExam     Vitals:    09/18/20 1441   BP: 108/62   Site: Right Upper Arm   Position: Sitting   Cuff Size: Large Adult   Pulse: 104   Resp: 16   Temp: 96.6 °F (35.9 °C)   TempSrc: Infrared   Weight: 295 lb (133.8 kg)   Height: 5' 9\" (1.753 m)       Physical Exam  Constitutional:       General: He is not in acute distress. Appearance: He is well-developed. HENT:      Head: Normocephalic and atraumatic. Right Ear: Tympanic membrane, ear canal and external ear normal. No drainage. No middle ear effusion. Tympanic membrane is not bulging. Tympanic membrane has normal mobility. Left Ear: Ear canal and external ear normal. Drainage (Completely occluding EAC) present. Nose: No septal deviation, mucosal edema or rhinorrhea. Mouth/Throat:      Lips: Pink.       Mouth: Mucous membranes are moist. Pharynx: Uvula midline. Tonsils: No tonsillar exudate. 1+ on the right. 1+ on the left. Eyes:      Pupils: Pupils are equal, round, and reactive to light. Neck:      Musculoskeletal: Full passive range of motion without pain. Thyroid: No thyroid mass or thyromegaly. Trachea: Trachea and phonation normal.   Cardiovascular:      Pulses: Normal pulses. Pulmonary:      Effort: Pulmonary effort is normal. No accessory muscle usage or respiratory distress. Breath sounds: No stridor. Lymphadenopathy:      Head:      Right side of head: No submental or submandibular adenopathy. Left side of head: No submental or submandibular adenopathy. Cervical: No cervical adenopathy. Right cervical: No superficial, deep or posterior cervical adenopathy. Left cervical: No superficial, deep or posterior cervical adenopathy. Skin:     General: Skin is warm and dry. Neurological:      Mental Status: He is alert and oriented to person, place, and time. Cranial Nerves: Cranial nerves are intact. No cranial nerve deficit or facial asymmetry. Coordination: Coordination normal.      Gait: Gait normal.   Psychiatric:         Thought Content: Thought content normal.           Procedure     Microscopic evaluation of ear with debridement    An operating microscope was utilized to visualize the external auditory canals using a 2 mm speculum. Purulence was suctioned from left EAC down to the tympanic membrane. Anterior superior perforation identified with active purulent otorrhea. Assessment and Plan     1.  Acute otitis media of left ear with perforation  -CT head reviewed-demonstrates opacification of left middle ear and partial opacification of left mastoid with septations intact without evidence of bony coalescence or erosion  -Agree with Augmentin, Cipro and Ciprodex drops  -Follow-up in 1 week to assess for improvement  -Discussed warning signs that would result in him

## 2020-09-20 NOTE — PROGRESS NOTES
Sensitivity: Within normal limits through 1.5kHz sloping to a mild sensorineural hearing loss. Speech Recognition Threshold: 20 dB HL  Word Recognition: Excellent (100%), based on NU-6 25-word list at 60 dBHL using recorded speech stimuli. Tympanometry: Normal peak pressure and compliance, Type A tympanogram, consistent with normal middle ear function. LEFT EAR:  Hearing Sensitivity: Moderate sloping to moderately-severe through 2kHz rising to moderately-severe through 6kHz sloping to a severe mixed conductive and sensorineural hearing loss through [facility]. Speech Recognition Threshold: 60 dB HL  Word Recognition: Excellent (100%), based on NU-6 25-word list at 100 dBHL masked using recorded speech stimuli. Tympanometry: Flat, no peak pressure or compliance, Type B tympanogram, with large ear canal volume, consistent with TM perforation. Reliability: Good  Transducer: Inserts (checked with headphones)     **NOTE: Conductive components of > 10 dBHL noted from 500-4000Hz in left ear. See scanned audiogram dated 9/25/2020  for results. PATIENT EDUCATION:       The following items were discussed with the patient:    - Good Communication Strategies,  - Hearing Loss and Hearing Aids and  - Tinnitus Management Strategies    Educational information was shared in the After Visit Summary. RECOMMENDATIONS:                                                                                                                                                                                                                                                            The following items are recommended based on patient report and results from today's appointment:   - Continue medical follow-up with Temo Escobar DO.   - Retest hearing as medically indicated and/or sooner if a change in hearing is noted.    - If desired, schedule a Hearing Aid Evaluation (HAE) appointment to discuss hearing aid options. - Utilize \"Good Communication Strategies\" as discussed to assist in speech understanding with communication partners. - Maintain a sound enriched environment to assist in the management of tinnitus symptoms.          Neri Ramos  Audiologist    Chart CC'd to: Nilsa Sullivaning, DO      Degree of   Hearing Sensitivity dB Range   Within Normal Limits (WNL) 0 - 20   Mild 20 - 40   Moderate 40 - 55   Moderately-Severe 55 - 70   Severe 70 - 90   Profound 90 +

## 2020-09-22 ENCOUNTER — TELEPHONE (OUTPATIENT)
Dept: ENT CLINIC | Age: 65
End: 2020-09-22

## 2020-09-22 LAB
ANAEROBIC CULTURE: ABNORMAL
GRAM STAIN RESULT: ABNORMAL
ORGANISM: ABNORMAL
ORGANISM: ABNORMAL
WOUND/ABSCESS: ABNORMAL
WOUND/ABSCESS: ABNORMAL

## 2020-09-22 NOTE — TELEPHONE ENCOUNTER
Spoke with  Chip regarding left ear. He states overall he feels the ear is improving. Continues to have some drainage. Has been taking medications without side effect. Informed him of culture result. He will follow-up later this week.

## 2020-09-25 ENCOUNTER — OFFICE VISIT (OUTPATIENT)
Dept: ENT CLINIC | Age: 65
End: 2020-09-25
Payer: MEDICARE

## 2020-09-25 ENCOUNTER — PROCEDURE VISIT (OUTPATIENT)
Dept: AUDIOLOGY | Age: 65
End: 2020-09-25
Payer: MEDICARE

## 2020-09-25 VITALS
WEIGHT: 301.8 LBS | SYSTOLIC BLOOD PRESSURE: 109 MMHG | HEIGHT: 69 IN | DIASTOLIC BLOOD PRESSURE: 49 MMHG | HEART RATE: 75 BPM | TEMPERATURE: 96.4 F | BODY MASS INDEX: 44.7 KG/M2

## 2020-09-25 PROCEDURE — 92567 TYMPANOMETRY: CPT | Performed by: AUDIOLOGIST

## 2020-09-25 PROCEDURE — 1036F TOBACCO NON-USER: CPT | Performed by: OTOLARYNGOLOGY

## 2020-09-25 PROCEDURE — 99213 OFFICE O/P EST LOW 20 MIN: CPT | Performed by: OTOLARYNGOLOGY

## 2020-09-25 PROCEDURE — G8427 DOCREV CUR MEDS BY ELIG CLIN: HCPCS | Performed by: OTOLARYNGOLOGY

## 2020-09-25 PROCEDURE — 3017F COLORECTAL CA SCREEN DOC REV: CPT | Performed by: OTOLARYNGOLOGY

## 2020-09-25 PROCEDURE — 92557 COMPREHENSIVE HEARING TEST: CPT | Performed by: AUDIOLOGIST

## 2020-09-25 PROCEDURE — 1123F ACP DISCUSS/DSCN MKR DOCD: CPT | Performed by: OTOLARYNGOLOGY

## 2020-09-25 PROCEDURE — 4040F PNEUMOC VAC/ADMIN/RCVD: CPT | Performed by: OTOLARYNGOLOGY

## 2020-09-25 PROCEDURE — G8417 CALC BMI ABV UP PARAM F/U: HCPCS | Performed by: OTOLARYNGOLOGY

## 2020-09-25 ASSESSMENT — ENCOUNTER SYMPTOMS
APNEA: 0
COUGH: 0
SINUS PRESSURE: 0
EYE ITCHING: 0
VOICE CHANGE: 0
SHORTNESS OF BREATH: 0
FACIAL SWELLING: 0
SORE THROAT: 0
TROUBLE SWALLOWING: 0

## 2020-09-25 NOTE — PROGRESS NOTES
[address], [address], [address] [place] [address]  P: [phone]       Patient     Viviana Roldan  1955    ChiefComplaint     Chief Complaint   Patient presents with    Follow-up     Ear has been feeling better since last visit. States he has been using the ear drops that were given to him at his last appointment, but he has ran out states they were supposed to last 10 days. History of Present Illness     Durga Xie is a 58-year-old male here for follow-up regarding his left ear. Since last seen by me he is taken oral antibiotics and eardrops as prescribed. Reports resolution of left ear drainage and significant improvement of left-sided headache. Continues to feel his hearing is decreased bilaterally but left greater than right. Again reports no previous ear drainage or ear surgery.   Past Medical History     Past Medical History:   Diagnosis Date    Anemia     Angina     Arthritis     CAD (coronary artery disease)     [facility]     CKD (chronic kidney disease) stage 3, GFR 30-59 ml/min ([facility]     Clostridium difficile diarrhea 3/16/12; 2/29/12    positive stool toxin    Diabetes mellitus ([address]     Diabetic neuropathy ([facility])     feet and legs    Disease of blood and blood forming organ     Dizziness     When he moves his head/ loses his balance    GERD (gastroesophageal reflux disease)     gastric ulcer    Headache     Hearing loss     Hyperlipidemia     Hypertension     Kidney stone 2002    Refusal of blood transfusions as patient is Mandaen     Sleep apnea     Tinnitus     Venous ulcer ([address]     LLE    Wound, open 1/13/2012       Past Surgical History     Past Surgical History:   Procedure Laterality Date    CARDIAC SURGERY      Dec 27 2010, triple bypass    CHOLECYSTECTOMY  9/2011    HERNIA REPAIR  age1    OTHER SURGICAL HISTORY  11-16-11 REPAIR LOWER STERNAL INCISION, REMOVAL OF ONE STERNAL WIRE,    OTHER MG/0.1ML LIQD nasal spray 1 spray by Nasal route as needed for Opioid Reversal 1 each 0    colchicine (COLCRYS) 0.6 MG tablet Take 1 tablet by mouth daily 30 tablet 3    docusate sodium (COLACE, DULCOLAX) 100 MG CAPS Take 100 mg by mouth 2 times daily      metolazone (ZAROXOLYN) 5 MG tablet Take 1 tablet by mouth as needed (weight gain > 3#) 10 tablet 0    potassium chloride (KLOR-CON M) 20 MEQ TBCR extended release tablet Take 1 tablet by mouth 2 times daily 60 tablet 3    torsemide (DEMADEX) 100 MG tablet Take 1 tablet by mouth 2 times daily 30 tablet 3    insulin aspart (NOVOLOG) 100 UNIT/ML injection vial Inject into the skin 3 times daily (before meals) 50 units with breakfast, 20 units with lunch and 60 units with dinner      allopurinol (ZYLOPRIM) 100 MG tablet Take 300 mg by mouth       atorvastatin (LIPITOR) 40 MG tablet Take 1 tablet by mouth daily 90 tablet 3    insulin glargine (LANTUS) 100 UNIT/ML injection vial Inject 55 Units into the skin 2 times daily (Patient taking differently: Inject 80 Units into the skin 2 times daily ) 1 vial 1    Compression Stockings MISC by Does not apply route 2 pair, knee high compression stockings, fitted/ zippered 2 each 1    pregabalin (LYRICA) 50 MG capsule Take 1 capsule by mouth Daily with supper 60 capsule 3    silver sulfADIAZINE (SILVADENE) 1 % cream Apply to BL lower leg ulcers daily 20 g 0    carvedilol (COREG) 25 MG tablet Take 1 tablet by mouth 2 times daily (with meals) 180 tablet 3    nitroGLYCERIN (NITROSTAT) 0.4 MG SL tablet Place 1 tablet under the tongue every 5 minutes as needed 25 tablet 1    polyethylene glycol (MIRALAX) packet Take 17 g by mouth daily.  fluticasone (FLONASE) 50 MCG/ACT nasal spray 1 spray by Nasal route nightly as needed.  aspirin 81 MG chewable tablet Take 1 tablet by mouth daily. 30 tablet     isosorbide mononitrate (IMDUR) 30 MG CR tablet Take 1 tablet by mouth daily.  30 tablet 0    ferrous sulfate 325 (65 FE) MG tablet Take 325 mg by mouth 3 times daily (with meals).  omeprazole (PRILOSEC) 20 MG capsule Take 20 mg by mouth 2 times daily. No current facility-administered medications for this visit. Review of Systems     Review of Systems   Constitutional: Negative for appetite change, chills, fatigue, fever and unexpected weight change. HENT: Positive for hearing loss. Negative for congestion, ear discharge, ear pain, facial swelling, nosebleeds, postnasal drip, sinus pressure, sneezing, sore throat, tinnitus, trouble swallowing and voice change. Eyes: Negative for itching. Respiratory: Negative for apnea, cough and shortness of breath. Gastrointestinal:        Negative for dysphasia   Endocrine: Negative for cold intolerance and heat intolerance. Musculoskeletal: Negative for myalgias and neck pain. Skin: Negative for rash. Allergic/Immunologic: Negative for environmental allergies. Neurological: Positive for headaches (Improving). Negative for dizziness. Psychiatric/Behavioral: Negative for confusion, decreased concentration and sleep disturbance. PhysicalExam     Vitals:    09/25/20 0958   BP: (!) 109/49   Site: Left Upper Arm   Position: Sitting   Cuff Size: Large Adult   Pulse: 75   Temp: 96.4 °F (35.8 °C)   TempSrc: Infrared   Weight: (!) 301 lb 12.8 oz (136.9 kg)   Height: 5' 9\" (1.753 m)       Constitutional:       Appearance: Normal appearance. HENT:      Head: Normocephalic. Nose: Nose normal.   Eyes:      Extraocular Movements: Extraocular movements intact. Ears: Right EAC clear, TM normal, middle ear clear. Left EAC clear, near-total anterior perforation. No evidence of otorrhea. Neck:      Musculoskeletal: Normal range of motion. Neurological:      General: No focal deficit present. Mental Status: She is alert and oriented to person, place, and time.    Psychiatric:         Mood and Affect: Mood normal.         Behavior: Behavior normal.         Thought Content: Thought content normal.           Assessment and Plan     1. Mixed conductive and sensorineural hearing loss of left ear with restricted hearing of right ear  -Audiogram reviewed-moderate conductive hearing loss of left ear with mild high-frequency sensorineural component bilaterally  - Sindhu Cortez [place]. D., Audiology, [facility]    2. Acute otitis media of left ear with perforation  -Acute infection resolved but persistent perforation  -Advised ongoing dry ear precautions  -Informed him to call should he have any redevelopment of pain or drainage      Follow-up in 3 months for reevaluation of left TM    Allison Harris DO  9/25/20      Portions of this note were dictated using Dragon.  There may be linguistic errors secondary to the use of this program.

## 2020-09-25 NOTE — PATIENT INSTRUCTIONS

## 2020-09-25 NOTE — Clinical Note
Dr. Jono Piper,    Thank you for your referral for audiometric testing on this patient. Please see the scanned audiogram (under \"Media\" tab) and encounter note for details. If you have any questions, or if there is anything else you need, please let me know.       Angela Heimlich, AuD  Audiologist  ---  [address]  [facility] - Audiology

## 2020-09-26 RX ORDER — CIPROFLOXACIN AND DEXAMETHASONE 3; 1 MG/ML; MG/ML
4 SUSPENSION/ DROPS AURICULAR (OTIC) 2 TIMES DAILY
Qty: 1 BOTTLE | Refills: 0 | Status: SHIPPED | OUTPATIENT
Start: 2020-09-26 | End: 2020-10-03

## 2020-10-02 ENCOUNTER — TELEPHONE (OUTPATIENT)
Dept: ENT CLINIC | Age: 65
End: 2020-10-02

## 2020-10-02 NOTE — TELEPHONE ENCOUNTER
This patient called in to the office this morning. Patient states that his left ear is warm and full, states that the left side of his head feels warm, but he has no pain. Patient states that he thinks he needs more amoxicillin. Patient prefers to speak with doctor Raymundo Proctor, advises that he wants to describe his symptoms to her directly. Patient states that he cannot come in for an appointment today because he does not have a ride.      Please call patient back at [phone] or [phone]

## 2020-10-02 NOTE — TELEPHONE ENCOUNTER
Attempted to return patient's phone call. First phone number continue to ring without ever being answered or going to voicemail. Second number call was unable to be completed.

## 2020-10-02 NOTE — TELEPHONE ENCOUNTER
Patient called in to the office to follow up in regards to earlier call. Writer had spoken with Dr. Malka Rubio who tried to reach patient via phone with no success. Writer spoke with patient and advised that per Dr. Malka Rubio, he is to complete his ear drops as scheduled and then follow up with Dr. Mingo Jones on Monday. This writer assisted patient in scheduling patient for an appointment on Monday 10/05/20 at 1pm. Patient requesting that more Amox be prescribed, explained to patient that Dr. Malka Rubio was going on leave and would be unable to prescribe him medications as she would be unable to see him. Patient voiced understanding. Patient states that his \"ear still feels very warm, and if it got too bad over the weekend he would go to ER\" but \"would try to stick it out until Monday\".

## 2020-10-05 ENCOUNTER — OFFICE VISIT (OUTPATIENT)
Dept: ENT CLINIC | Age: 65
End: 2020-10-05
Payer: MEDICARE

## 2020-10-05 ENCOUNTER — TELEPHONE (OUTPATIENT)
Dept: ENT CLINIC | Age: 65
End: 2020-10-05

## 2020-10-05 VITALS
TEMPERATURE: 97.2 F | SYSTOLIC BLOOD PRESSURE: 127 MMHG | BODY MASS INDEX: 44.88 KG/M2 | HEART RATE: 82 BPM | WEIGHT: 303 LBS | DIASTOLIC BLOOD PRESSURE: 58 MMHG | HEIGHT: 69 IN

## 2020-10-05 PROCEDURE — 1036F TOBACCO NON-USER: CPT | Performed by: OTOLARYNGOLOGY

## 2020-10-05 PROCEDURE — G8484 FLU IMMUNIZE NO ADMIN: HCPCS | Performed by: OTOLARYNGOLOGY

## 2020-10-05 PROCEDURE — 3017F COLORECTAL CA SCREEN DOC REV: CPT | Performed by: OTOLARYNGOLOGY

## 2020-10-05 PROCEDURE — G8417 CALC BMI ABV UP PARAM F/U: HCPCS | Performed by: OTOLARYNGOLOGY

## 2020-10-05 PROCEDURE — 99213 OFFICE O/P EST LOW 20 MIN: CPT | Performed by: OTOLARYNGOLOGY

## 2020-10-05 PROCEDURE — 4040F PNEUMOC VAC/ADMIN/RCVD: CPT | Performed by: OTOLARYNGOLOGY

## 2020-10-05 PROCEDURE — 1123F ACP DISCUSS/DSCN MKR DOCD: CPT | Performed by: OTOLARYNGOLOGY

## 2020-10-05 PROCEDURE — 4130F TOPICAL PREP RX AOE: CPT | Performed by: OTOLARYNGOLOGY

## 2020-10-05 PROCEDURE — G8427 DOCREV CUR MEDS BY ELIG CLIN: HCPCS | Performed by: OTOLARYNGOLOGY

## 2020-10-05 PROCEDURE — 92504 EAR MICROSCOPY EXAMINATION: CPT | Performed by: OTOLARYNGOLOGY

## 2020-10-05 RX ORDER — CLOTRIMAZOLE 1 G/ML
SOLUTION TOPICAL
Qty: 29.57 ML | Refills: 1 | Status: ON HOLD | OUTPATIENT
Start: 2020-10-05 | End: 2022-03-14

## 2020-10-05 NOTE — TELEPHONE ENCOUNTER
Amrik Posada with [address] called back and said to disregard the previous message, the system was trying to run the medication as brand name, but when it was ran as generic the insurance covered the medication. Chief Complaint   Patient presents with    Seizure    New Patient       Corine Hadley. presents today for   Chief Complaint   Patient presents with    Seizure    New Patient       Is someone accompanying this pt? Yes, Miley Felix    Is the patient using any DME equipment during OV? Yes, first choice    Depression Screening:  No flowsheet data found. Learning Assessment:  No flowsheet data found. Abuse Screening:  No flowsheet data found. Fall Risk  No flowsheet data found. Coordination of Care:  1. Have you been to the ER, urgent care clinic since your last visit? Hospitalized since your last visit? Yes, [facility]    2. Have you seen or consulted any other health care providers outside of the [address] [place] [address] since your last visit? Include any pap smears or colon screening.  Manjula Garzaught Dr. Zakia Philip

## 2020-10-05 NOTE — TELEPHONE ENCOUNTER
Pharmacy called in regard to the clotrimazole Dr. Richard Eisenberg ordered. The pharmacy stated the patients insurance does not cover the medication and it would cost $1400. The pharmacy was wondering if there was a different medication that could be ordered. I told the pharmacy I would speak with Dr. Richard Eisenberg and either the provider would give them a call, I would give them a thea after speaking with Dr. Richard Eisenberg or Dr. Richard Eisenberg would send in a different medication.

## 2020-10-05 NOTE — PROGRESS NOTES
[address] UP VISIT      Patient Name: Clau Trejo  Medical Record Number:  [id number]  Primary Care Physician:  Sruthi Ko MD    ChiefComplaint     Chief Complaint   Patient presents with    Ear Problem     States the pain has gone away, feels like his head is stuffed, and it feels like his ear has air in it. States he has finished the cipro, not sure if he needs more. History of Present Illness     Shay Franks is an 76 y.o. male previously seen for left sided headache and drainage with suspected acute otitis media by Dr. Michael Nguyễn. Interval History: Since last visit, states significantly improved otalgia without otorrhea; denies headache. Continues to have left-sided aural fullness and left-sided hearing loss. No vertigo, fevers, chills, night sweats. Patient is a diabetic, last A1c 6.5 on 9/20/2019.     Past Medical History     Past Medical History:   Diagnosis Date    Anemia     Angina     Arthritis     CAD (coronary artery disease)     [facility]     CKD (chronic kidney disease) stage 3, GFR 30-59 ml/min     Clostridium difficile diarrhea 3/16/12; 2/29/12    positive stool toxin    Diabetes mellitus ([address]     Diabetic neuropathy ([facility])     feet and legs    Disease of blood and blood forming organ     Dizziness     When he moves his head/ loses his balance    GERD (gastroesophageal reflux disease)     gastric ulcer    Headache     Hearing loss     Hyperlipidemia     Hypertension     Kidney stone 2002    Refusal of blood transfusions as patient is Sabianist     Sleep apnea     Tinnitus     Venous ulcer ([address]     LLE    Wound, open 1/13/2012       Past Surgical History     Past Surgical History:   Procedure Laterality Date    CARDIAC SURGERY      Dec 27 2010, triple bypass    CHOLECYSTECTOMY  9/2011    HERNIA REPAIR  age1    OTHER SURGICAL HISTORY  11-16-11 REPAIR LOWER STERNAL INCISION, REMOVAL OF ONE STERNAL WIRE,    OTHER SURGICAL HISTORY  10/10/2014    phacoemulsification of cataract with intraocular lens implant left eye    UPPER GASTROINTESTINAL ENDOSCOPY         Family History     Family History   Problem Relation Age of Onset    Heart Disease Mother     Heart Disease Father     Cancer Father     Diabetes Brother     Diabetes Brother        Social History     Social History     Tobacco Use    Smoking status: Former Smoker     Packs/day: 1.00     Years: 16.00     Pack years: 16.00     Last attempt to quit: 1/10/1988     Years since quittin.7    Smokeless tobacco: Never Used    Tobacco comment: 22 yrs ago   Substance Use Topics    Alcohol use: No     Alcohol/week: 0.0 standard drinks    Drug use: No        Allergies     Allergies   Allergen Reactions    Amitriptyline Other (See Comments)     tremor    Celecoxib      Hyperkalemia    Cymbalta [Duloxetine Hcl] Other (See Comments)     Tremors and slurred speech    Elavil [Amitriptyline Hcl]      tremor    Gabapentin      Tremor at high dose    Nsaids      Gastric ulcer       Medications     Current Outpatient Medications   Medication Sig Dispense Refill    clotrimazole (LOTRIMIN) 1 % external solution Apply 4 drops to the left ear twice daily (morning and evening) for 10 days 29.57 mL 1    oxyCODONE-acetaminophen (PERCOCET) 5-325 MG per tablet Take 1 tablet by mouth 4 times daily for 30 days. 120 tablet 0    [START ON 10/19/2020] oxyCODONE-acetaminophen (PERCOCET) 5-325 MG per tablet Take 1 tablet by mouth 4 times daily for 30 days. 120 tablet 0    acetaminophen (TYLENOL) 500 MG tablet Take 2 tablets by mouth 4 times daily as needed for Pain 60 tablet 0    hydrocortisone (ANUSOL-HC) 2.5 % rectal cream Place rectally 2 times daily.  1 Tube 0    senna (SENOKOT) 8.6 MG TABS tablet Take 1 tablet by mouth 2 times daily 120 tablet 0    naloxone 4 MG/0.1ML LIQD nasal spray 1 spray by Nasal route as needed for Opioid Reversal 1 each 0    colchicine (COLCRYS) 0.6 MG tablet Take 1 tablet by mouth daily 30 tablet 3    docusate sodium (COLACE, DULCOLAX) 100 MG CAPS Take 100 mg by mouth 2 times daily      metolazone (ZAROXOLYN) 5 MG tablet Take 1 tablet by mouth as needed (weight gain > 3#) 10 tablet 0    potassium chloride (KLOR-CON M) 20 MEQ TBCR extended release tablet Take 1 tablet by mouth 2 times daily 60 tablet 3    torsemide (DEMADEX) 100 MG tablet Take 1 tablet by mouth 2 times daily 30 tablet 3    insulin aspart (NOVOLOG) 100 UNIT/ML injection vial Inject into the skin 3 times daily (before meals) 50 units with breakfast, 20 units with lunch and 60 units with dinner      allopurinol (ZYLOPRIM) 100 MG tablet Take 300 mg by mouth       atorvastatin (LIPITOR) 40 MG tablet Take 1 tablet by mouth daily 90 tablet 3    insulin glargine (LANTUS) 100 UNIT/ML injection vial Inject 55 Units into the skin 2 times daily (Patient taking differently: Inject 80 Units into the skin 2 times daily ) 1 vial 1    Compression Stockings MISC by Does not apply route 2 pair, knee high compression stockings, fitted/ zippered 2 each 1    pregabalin (LYRICA) 50 MG capsule Take 1 capsule by mouth Daily with supper 60 capsule 3    silver sulfADIAZINE (SILVADENE) 1 % cream Apply to BL lower leg ulcers daily 20 g 0    carvedilol (COREG) 25 MG tablet Take 1 tablet by mouth 2 times daily (with meals) 180 tablet 3    nitroGLYCERIN (NITROSTAT) 0.4 MG SL tablet Place 1 tablet under the tongue every 5 minutes as needed 25 tablet 1    polyethylene glycol (MIRALAX) packet Take 17 g by mouth daily.  fluticasone (FLONASE) 50 MCG/ACT nasal spray 1 spray by Nasal route nightly as needed.  aspirin 81 MG chewable tablet Take 1 tablet by mouth daily. 30 tablet     isosorbide mononitrate (IMDUR) 30 MG CR tablet Take 1 tablet by mouth daily.  30 tablet 0    ferrous sulfate 325 (65 FE) MG tablet Take 325 mg by mouth 3 times daily (with meals).  omeprazole (PRILOSEC) 20 MG capsule Take 20 mg by mouth 2 times daily. No current facility-administered medications for this visit.         Review of Systems     REVIEW OF SYSTEMS  The following systems were reviewed and revealed the following in addition to any already discussed in the HPI:    CONSTITUTIONAL: No weight loss, no fever, no night sweats, no chills  EYES: no vision changes, no blurry vision  EARS: no changes in hearing, + otalgia (1/10), +aural fullness  NOSE: no epistaxis, no rhinorrhea  RESPIRATORY: No difficulty breathing, no shortness of breath  CV: no chest pain, no peripheral vascular disease  HEME: No coagulation disorder, no bleeding disorder  NEURO: No TIA or stroke-like symptoms  SKIN: No new rashes in the head and neck, no recent skin cancers  MOUTH: Delete no new ulcers, no recent teeth infections  GASTROINTESTINAL: No diarrhea, stomach pain  PSYCH: No anxiety, no depression    PhysicalExam     Vitals:    10/05/20 1307   BP: (!) 127/58   Site: Left Upper Arm   Position: Sitting   Cuff Size: Large Adult   Pulse: 82   Temp: 97.2 °F (36.2 °C)   TempSrc: Infrared   Weight: (!) 303 lb (137.4 kg)   Height: 5' 9\" (1.753 m)       PHYSICAL EXAM  BP (!) 127/58 (Site: Left Upper Arm, Position: Sitting, Cuff Size: Large Adult)   Pulse 82   Temp 97.2 °F (36.2 °C) (Infrared)   Ht 5' 9\" (1.753 m)   Wt (!) 303 lb (137.4 kg)   BMI 44.75 kg/m²     GENERAL: No Acute Distress, Alert and Oriented, no hoarseness  EYES: EOMI, Anti-icteric  NOSE: On anterior rhinoscopy there is no epistaxis, nasal mucosa within normal limits, no purulent drainage  EARS: Normal external appearance; on portable otomicroscopy:  -Right ear: External auditory canal without stenosis, tympanic membrane clear, no middle ear effusions or retractions  -Left ear: External auditory canal without stenosis, fungal hyphae noted  FACE: 1/6 House-Brackmann Scale, symmetric, sensation equal bilaterally  ORAL CAVITY: No masses or lesions palpated, uvula is midline, moist mucous membranes, tongue midline/mobile  NECK: Normal range of motion, no thyromegaly, trachea is midline, no lymphadenopathy, no neck masses, no crepitus  CHEST: Normal respiratory effort, no retractions, breathing comfortably  SKIN: No rashes, normal appearing skin, no evidence of skin lesions/tumors  NEURO: CN 2, 3, 4, 5, 6, 7, 11, 12 intact bilaterally       Procedure     Binocular Otomicroscopy     Pre op: Left fungal otitis externa  Post op: Left fungal otitis externa, tympanic membrane perforation  Procedure : Binocular otomicroscopy  Surgeon: RODRIGUEZ Bhakta  Estimated Blood Loss: None    After obtaining consent, the patient was placed in the examination chair in the reclined position.      -Left ear: External auditory canal with fungal otitis externa/hyphae in the external auditory canal, bradycardia with #5 Albanian suction, tympanic membrane showing 40-50% central perforation of the anterior daksha-tympanum, round window niche appreciated, poor visualization of the malleus/manubrium     * Patient tolerated the procedure well with no complications      Assessment and Plan     1. Otitis externa, fungal, left ear  Patient with prior acute otitis media/externa, now with fungal otitis externa. We will start him on clotrimazole drops, and he is to continue dry ear precautions to the left ear  - clotrimazole (LOTRIMIN) 1 % external solution; Apply 4 drops to the left ear twice daily (morning and evening) for 10 days  Dispense: 29.57 mL; Refill: 1    2. Mixed conductive and sensorineural hearing loss of left ear with restricted hearing of right ear  Patient with mixed hearing loss of left ear, with significant conductive gap-likely due to left tympanic membrane perforation but may also have an element of ossicular chain discontinuity/fixation. Will attempt to get a better ear examination once fungal otitis externa is resolved    3.  Tympanic membrane perforation,

## 2020-10-06 ENCOUNTER — TELEPHONE (OUTPATIENT)
Dept: ENT CLINIC | Age: 65
End: 2020-10-06

## 2020-10-06 NOTE — TELEPHONE ENCOUNTER
Patient called to let us know that the Pharmacy got it all worked out and that he no longer needs prior approval.

## 2020-10-06 NOTE — TELEPHONE ENCOUNTER
Patient called because his insurance declined the medication and it will cost the patient over a $100 and they isnt any way that he can afford that. Is there another medication that he can take or a way for us to his insurance company know that he needs this medication so they can approve it. Patient would like Dr. Violette Hernandez to call back and talk to him about a few questions.

## 2020-10-15 ENCOUNTER — OFFICE VISIT (OUTPATIENT)
Dept: ENT CLINIC | Age: 65
End: 2020-10-15
Payer: MEDICARE

## 2020-10-15 VITALS
BODY MASS INDEX: 45.18 KG/M2 | DIASTOLIC BLOOD PRESSURE: 61 MMHG | TEMPERATURE: 97 F | HEART RATE: 81 BPM | WEIGHT: 305 LBS | HEIGHT: 69 IN | SYSTOLIC BLOOD PRESSURE: 117 MMHG

## 2020-10-15 PROBLEM — B36.9 OTITIS EXTERNA, FUNGAL, LEFT EAR: Status: ACTIVE | Noted: 2020-10-15

## 2020-10-15 PROBLEM — H90.A32 MIXED CONDUCTIVE AND SENSORINEURAL HEARING LOSS OF LEFT EAR WITH RESTRICTED HEARING OF RIGHT EAR: Status: ACTIVE | Noted: 2020-10-15

## 2020-10-15 PROBLEM — H72.92 TYMPANIC MEMBRANE PERFORATION, LEFT: Status: ACTIVE | Noted: 2020-10-15

## 2020-10-15 PROBLEM — H62.42 OTITIS EXTERNA, FUNGAL, LEFT EAR: Status: ACTIVE | Noted: 2020-10-15

## 2020-10-15 PROCEDURE — 4130F TOPICAL PREP RX AOE: CPT | Performed by: OTOLARYNGOLOGY

## 2020-10-15 PROCEDURE — G8427 DOCREV CUR MEDS BY ELIG CLIN: HCPCS | Performed by: OTOLARYNGOLOGY

## 2020-10-15 PROCEDURE — 4040F PNEUMOC VAC/ADMIN/RCVD: CPT | Performed by: OTOLARYNGOLOGY

## 2020-10-15 PROCEDURE — 1036F TOBACCO NON-USER: CPT | Performed by: OTOLARYNGOLOGY

## 2020-10-15 PROCEDURE — G8484 FLU IMMUNIZE NO ADMIN: HCPCS | Performed by: OTOLARYNGOLOGY

## 2020-10-15 PROCEDURE — 1123F ACP DISCUSS/DSCN MKR DOCD: CPT | Performed by: OTOLARYNGOLOGY

## 2020-10-15 PROCEDURE — 3017F COLORECTAL CA SCREEN DOC REV: CPT | Performed by: OTOLARYNGOLOGY

## 2020-10-15 PROCEDURE — 99213 OFFICE O/P EST LOW 20 MIN: CPT | Performed by: OTOLARYNGOLOGY

## 2020-10-15 PROCEDURE — G8417 CALC BMI ABV UP PARAM F/U: HCPCS | Performed by: OTOLARYNGOLOGY

## 2020-10-15 NOTE — PROGRESS NOTES
[address] [facility] UP VISIT      Patient Name: Shaan Rubio  Medical Record Number:  [id number]  Primary Care Physician:  Sasha Moeller MD    ChiefComplaint     Chief Complaint   Patient presents with    Follow-up     States pain in ear is improving, states he has felt stuffed between his ears would like to discuss this today. States he feels like he can not hear that well out of his left ear as he can in his right ear       History of Present Illness     Shay Redding is an 76 y.o. male previously seen for left sided headache and drainage with suspected acute otitis media by Dr. Yovanny Gomes. Interval History 10/5/2020: Since last visit, states significantly improved otalgia without otorrhea; denies headache. Continues to have left-sided aural fullness and left-sided hearing loss. No vertigo, fevers, chills, night sweats. Patient is a diabetic, last A1c 6.5 on 9/20/2019. Interval history 10/15/2020: Since last visit, patient states minimal otorrhea from the left ear and resolution of pruritus. Continues to state poor hearing in the left ear. Intermittent head congestion, has been taking Excedrin which improves this.     Past Medical History     Past Medical History:   Diagnosis Date    Anemia     Angina     Arthritis     CAD (coronary artery disease)     CH[facility]congestive [facility] failure) ([facility]     CKD (chronic kidney disease) stage 3, GFR 30-59 ml/min     Clostridium difficile diarrhea 3/16/12; 2/29/12    positive stool toxin    Diabetes mellitus ([address]     Diabetic neuropathy ([address]     feet and legs    Disease of blood and blood forming organ     Dizziness     When he moves his head/ loses his balance    GERD (gastroesophageal reflux disease)     gastric ulcer    Headache     Hearing loss     Hyperlipidemia     Hypertension     Kidney stone 2002    Refusal of blood transfusions as patient is Sikhism     Sleep apnea  Tinnitus     Venous ulcer ([facility] [address]     LLE    Wound, open 2012       Past Surgical History     Past Surgical History:   Procedure Laterality Date    CARDIAC SURGERY      Dec 27 2010, triple bypass    CHOLECYSTECTOMY  2011    HERNIA REPAIR  age2    OTHER SURGICAL HISTORY  11 REPAIR LOWER STERNAL INCISION, REMOVAL OF ONE STERNAL WIRE,    OTHER SURGICAL HISTORY  10/10/2014    phacoemulsification of cataract with intraocular lens implant left eye    UPPER GASTROINTESTINAL ENDOSCOPY         Family History     Family History   Problem Relation Age of Onset    Heart Disease Mother     Heart Disease Father     Cancer Father     Diabetes Brother     Diabetes Brother        Social History     Social History     Tobacco Use    Smoking status: Former Smoker     Packs/day: 1.00     Years: 16.00     Pack years: 16.00     Last attempt to quit: 1/10/1988     Years since quittin.7    Smokeless tobacco: Never Used    Tobacco comment: 22 yrs ago   Substance Use Topics    Alcohol use: No     Alcohol/week: 0.0 standard drinks    Drug use: No        Allergies     Allergies   Allergen Reactions    Amitriptyline Other (See Comments)     tremor    Celecoxib      Hyperkalemia    Cymbalta [Duloxetine Hcl] Other (See Comments)     Tremors and slurred speech    Elavil [Amitriptyline Hcl]      tremor    Gabapentin      Tremor at high dose    Nsaids      Gastric ulcer       Medications     Current Outpatient Medications   Medication Sig Dispense Refill    clotrimazole (LOTRIMIN) 1 % external solution Apply 4 drops to the left ear twice daily (morning and evening) for 10 days 29.57 mL 1    oxyCODONE-acetaminophen (PERCOCET) 5-325 MG per tablet Take 1 tablet by mouth 4 times daily for 30 days. 120 tablet 0    [START ON 10/19/2020] oxyCODONE-acetaminophen (PERCOCET) 5-325 MG per tablet Take 1 tablet by mouth 4 times daily for 30 days.  120 tablet 0    acetaminophen (TYLENOL) 500 MG tablet Take 2 tablets by mouth 4 times daily as needed for Pain 60 tablet 0    hydrocortisone (ANUSOL-HC) 2.5 % rectal cream Place rectally 2 times daily. 1 Tube 0    senna (SENOKOT) 8.6 MG TABS tablet Take 1 tablet by mouth 2 times daily 120 tablet 0    naloxone 4 MG/0.1ML LIQD nasal spray 1 spray by Nasal route as needed for Opioid Reversal 1 each 0    colchicine (COLCRYS) 0.6 MG tablet Take 1 tablet by mouth daily 30 tablet 3    docusate sodium (COLACE, DULCOLAX) 100 MG CAPS Take 100 mg by mouth 2 times daily      metolazone (ZAROXOLYN) 5 MG tablet Take 1 tablet by mouth as needed (weight gain > 3#) 10 tablet 0    potassium chloride (KLOR-CON M) 20 MEQ TBCR extended release tablet Take 1 tablet by mouth 2 times daily 60 tablet 3    torsemide (DEMADEX) 100 MG tablet Take 1 tablet by mouth 2 times daily 30 tablet 3    insulin aspart (NOVOLOG) 100 UNIT/ML injection vial Inject into the skin 3 times daily (before meals) 50 units with breakfast, 20 units with lunch and 60 units with dinner      allopurinol (ZYLOPRIM) 100 MG tablet Take 300 mg by mouth       atorvastatin (LIPITOR) 40 MG tablet Take 1 tablet by mouth daily 90 tablet 3    insulin glargine (LANTUS) 100 UNIT/ML injection vial Inject 55 Units into the skin 2 times daily (Patient taking differently: Inject 80 Units into the skin 2 times daily ) 1 vial 1    Compression Stockings MISC by Does not apply route 2 pair, knee high compression stockings, fitted/ zippered 2 each 1    pregabalin (LYRICA) 50 MG capsule Take 1 capsule by mouth Daily with supper 60 capsule 3    silver sulfADIAZINE (SILVADENE) 1 % cream Apply to BL lower leg ulcers daily 20 g 0    carvedilol (COREG) 25 MG tablet Take 1 tablet by mouth 2 times daily (with meals) 180 tablet 3    nitroGLYCERIN (NITROSTAT) 0.4 MG SL tablet Place 1 tablet under the tongue every 5 minutes as needed 25 tablet 1    polyethylene glycol (MIRALAX) packet Take 17 g by mouth daily.       fluticasone (FLONASE) 50 MCG/ACT nasal spray 1 spray by Nasal route nightly as needed.  aspirin 81 MG chewable tablet Take 1 tablet by mouth daily. 30 tablet     isosorbide mononitrate (IMDUR) 30 MG CR tablet Take 1 tablet by mouth daily. 30 tablet 0    ferrous sulfate 325 (65 FE) MG tablet Take 325 mg by mouth 3 times daily (with meals).  omeprazole (PRILOSEC) 20 MG capsule Take 20 mg by mouth 2 times daily. No current facility-administered medications for this visit.         Review of Systems     REVIEW OF SYSTEMS  The following systems were reviewed and revealed the following in addition to any already discussed in the HPI:    CONSTITUTIONAL: No weight loss, no fever, no night sweats, no chills  EYES: no vision changes, no blurry vision  EARS: no changes in hearing, aural fullness  NOSE: no epistaxis, no rhinorrhea  RESPIRATORY: No difficulty breathing, no shortness of breath  CV: no chest pain, no peripheral vascular disease  HEME: No coagulation disorder, no bleeding disorder  NEURO: No TIA or stroke-like symptoms  SKIN: No new rashes in the head and neck, no recent skin cancers  MOUTH: Delete no new ulcers, no recent teeth infections  GASTROINTESTINAL: No diarrhea, stomach pain  PSYCH: No anxiety, no depression    PhysicalExam     Vitals:    10/15/20 1312   BP: 117/61   Site: Left Upper Arm   Position: Sitting   Cuff Size: Large Adult   Pulse: 81   Temp: 97 °F (36.1 °C)   TempSrc: Infrared   Weight: (!) 305 lb (138.3 kg)   Height: 5' 9\" (1.753 m)       PHYSICAL EXAM  /61 (Site: Left Upper Arm, Position: Sitting, Cuff Size: Large Adult)   Pulse 81   Temp 97 °F (36.1 °C) (Infrared)   Ht 5' 9\" (1.753 m)   Wt (!) 305 lb (138.3 kg)   BMI 45.04 kg/m²     GENERAL: No Acute Distress, Alert and Oriented, no hoarseness  EYES: EOMI, Anti-icteric  NOSE: On anterior rhinoscopy there is no epistaxis, nasal mucosa within normal limits, no purulent drainage  EARS: Normal external appearance; on portable otomicroscopy:  -Right ear: conductive gap-likely due to left tympanic membrane perforation but may also have an element of ossicular chain discontinuity/fixation. We will see him back in approximately 2 months to see if his tympanic membrane perforation has improved. 3. Tympanic membrane perforation, left  Left tympanic membrane, 40-50%, central-no cholesteatoma visualized in the middle ear, we will see patient back in approximately 2 months to determine if tympanic membrane patient has decreased in size    Return in about 2 months (around 12/15/2020). Mireya Ontiveros MD  [address] [place] [address],[address]  Department of Otolaryngology/Head and Neck Surgery  10/15/20    I have performed a head and neck physical exam personally or was physically present during the key or critical portions of the service. Medical Decision Making: The following items were considered in medical decision making:  Independent review of images  Review / order clinical lab tests  Review / order radiology tests  Decision to obtain old records  Review and summation of old records as accessed through [facility] (a summary of my findings in these old records: None)     Portions of this note were dictated using Dragon.  There may be linguistic errors secondary to the use of this program.

## 2020-12-15 ENCOUNTER — OFFICE VISIT (OUTPATIENT)
Dept: ENT CLINIC | Age: 65
End: 2020-12-15
Payer: MEDICARE

## 2020-12-15 VITALS
HEIGHT: 68 IN | HEART RATE: 98 BPM | BODY MASS INDEX: 46.83 KG/M2 | DIASTOLIC BLOOD PRESSURE: 66 MMHG | SYSTOLIC BLOOD PRESSURE: 120 MMHG | TEMPERATURE: 97.8 F | WEIGHT: 309 LBS

## 2020-12-15 PROBLEM — B36.9 OTITIS EXTERNA, FUNGAL, LEFT EAR: Status: RESOLVED | Noted: 2020-10-15 | Resolved: 2020-12-15

## 2020-12-15 PROBLEM — H62.42 OTITIS EXTERNA, FUNGAL, LEFT EAR: Status: RESOLVED | Noted: 2020-10-15 | Resolved: 2020-12-15

## 2020-12-15 PROCEDURE — G8484 FLU IMMUNIZE NO ADMIN: HCPCS | Performed by: OTOLARYNGOLOGY

## 2020-12-15 PROCEDURE — 3017F COLORECTAL CA SCREEN DOC REV: CPT | Performed by: OTOLARYNGOLOGY

## 2020-12-15 PROCEDURE — 99213 OFFICE O/P EST LOW 20 MIN: CPT | Performed by: OTOLARYNGOLOGY

## 2020-12-15 PROCEDURE — 1123F ACP DISCUSS/DSCN MKR DOCD: CPT | Performed by: OTOLARYNGOLOGY

## 2020-12-15 PROCEDURE — 4040F PNEUMOC VAC/ADMIN/RCVD: CPT | Performed by: OTOLARYNGOLOGY

## 2020-12-15 PROCEDURE — G8427 DOCREV CUR MEDS BY ELIG CLIN: HCPCS | Performed by: OTOLARYNGOLOGY

## 2020-12-15 PROCEDURE — G8417 CALC BMI ABV UP PARAM F/U: HCPCS | Performed by: OTOLARYNGOLOGY

## 2020-12-15 PROCEDURE — 1036F TOBACCO NON-USER: CPT | Performed by: OTOLARYNGOLOGY

## 2020-12-15 NOTE — PROGRESS NOTES
[address] [facility] UP VISIT      Patient Name: Kit Brantley  Medical Record Number:  [id number]  Primary Care Physician:  Jose Manuel Mcfadden MD    ChiefComplaint     Chief Complaint   Patient presents with    Follow-up     Ears feel stuffed, when he bends his head he feels dizzy. States ear drops helped, but it was hard to get the drops out of the bottle. History of Present Illness     Shay Pierce is an 76 y.o. male previously seen for left sided headache and drainage with suspected acute otitis media by Dr. Kendra Hernandez. Interval History 10/5/2020: Since last visit, states significantly improved otalgia without otorrhea; denies headache. Continues to have left-sided aural fullness and left-sided hearing loss. No vertigo, fevers, chills, night sweats. Patient is a diabetic, last A1c 6.5 on 9/20/2019. Interval history 10/15/2020: Since last visit, patient states minimal otorrhea from the left ear and resolution of pruritus. Continues to state poor hearing in the left ear. Intermittent head congestion, has been taking Excedrin which improves this. Interval history 12/15/2020: No interval pruritus, pain, otorrhea. Continues to have poor hearing in the left ear-states he is left-handed, and has difficulty using the telephone in the left ear.     Past Medical History     Past Medical History:   Diagnosis Date    Anemia     Angina     Arthritis     CAD (coronary artery disease)     CHF (congestive heart failure) (HCC)     CKD (chronic kidney disease) stage 3, GFR 30-59 ml/min     Clostridium difficile diarrhea 3/16/12; 2/29/12    positive stool toxin    Diabetes mellitus ([address]     Diabetic neuropathy ([facility])     feet and legs    Disease of blood and blood forming organ     Dizziness     When he moves his head/ loses his balance    GERD (gastroesophageal reflux disease)     gastric ulcer    Headache     Hearing loss     Hyperlipidemia     Hypertension     Kidney stone     Refusal of blood transfusions as patient is Gnosticist     Sleep apnea     Tinnitus     Venous ulcer (HCC)     LLE    Wound, open 2012       Past Surgical History     Past Surgical History:   Procedure Laterality Date    CARDIAC SURGERY      Dec 27 2010, triple bypass    CHOLECYSTECTOMY  2011    HERNIA REPAIR  age1    OTHER SURGICAL HISTORY  11 REPAIR LOWER STERNAL INCISION, REMOVAL OF ONE STERNAL WIRE,    OTHER SURGICAL HISTORY  10/10/2014    phacoemulsification of cataract with intraocular lens implant left eye    UPPER GASTROINTESTINAL ENDOSCOPY         Family History     Family History   Problem Relation Age of Onset    Heart Disease Mother     Heart Disease Father     Cancer Father     Diabetes Brother     Diabetes Brother        Social History     Social History     Tobacco Use    Smoking status: Former Smoker     Packs/day: 1.00     Years: 16.00     Pack years: 16.00     Quit date: 1/10/1988     Years since quittin.9    Smokeless tobacco: Never Used    Tobacco comment: 22 yrs ago   Substance Use Topics    Alcohol use: No     Alcohol/week: 0.0 standard drinks    Drug use: No        Allergies     Allergies   Allergen Reactions    Amitriptyline Other (See Comments)     tremor    Celecoxib      Hyperkalemia    Cymbalta [Duloxetine Hcl] Other (See Comments)     Tremors and slurred speech    Elavil [Amitriptyline Hcl]      tremor    Gabapentin      Tremor at high dose    Nsaids      Gastric ulcer       Medications     Current Outpatient Medications   Medication Sig Dispense Refill    naloxone 4 MG/0.1ML LIQD nasal spray 1 spray by Nasal route as needed for Opioid Reversal 1 each 0    oxyCODONE-acetaminophen (PERCOCET) 5-325 MG per tablet Take 1 tablet by mouth 4 times daily for 30 days.  120 tablet 0    [START ON 2020] oxyCODONE-acetaminophen (PERCOCET) 5-325 MG per tablet Take 1 tablet by mouth 4 times daily for 30 days. 120 tablet 0    clotrimazole (LOTRIMIN) 1 % external solution Apply 4 drops to the left ear twice daily (morning and evening) for 10 days 29.57 mL 1    acetaminophen (TYLENOL) 500 MG tablet Take 2 tablets by mouth 4 times daily as needed for Pain 60 tablet 0    hydrocortisone (ANUSOL-HC) 2.5 % rectal cream Place rectally 2 times daily.  1 Tube 0    senna (SENOKOT) 8.6 MG TABS tablet Take 1 tablet by mouth 2 times daily 120 tablet 0    naloxone 4 MG/0.1ML LIQD nasal spray 1 spray by Nasal route as needed for Opioid Reversal 1 each 0    colchicine (COLCRYS) 0.6 MG tablet Take 1 tablet by mouth daily 30 tablet 3    docusate sodium (COLACE, DULCOLAX) 100 MG CAPS Take 100 mg by mouth 2 times daily      metolazone (ZAROXOLYN) 5 MG tablet Take 1 tablet by mouth as needed (weight gain > 3#) 10 tablet 0    potassium chloride (KLOR-CON M) 20 MEQ TBCR extended release tablet Take 1 tablet by mouth 2 times daily 60 tablet 3    torsemide (DEMADEX) 100 MG tablet Take 1 tablet by mouth 2 times daily 30 tablet 3    insulin aspart (NOVOLOG) 100 UNIT/ML injection vial Inject into the skin 3 times daily (before meals) 50 units with breakfast, 20 units with lunch and 60 units with dinner      allopurinol (ZYLOPRIM) 100 MG tablet Take 300 mg by mouth       atorvastatin (LIPITOR) 40 MG tablet Take 1 tablet by mouth daily 90 tablet 3    insulin glargine (LANTUS) 100 UNIT/ML injection vial Inject 55 Units into the skin 2 times daily (Patient taking differently: Inject 80 Units into the skin 2 times daily ) 1 vial 1    Compression Stockings MISC by Does not apply route 2 pair, knee high compression stockings, fitted/ zippered 2 each 1    pregabalin (LYRICA) 50 MG capsule Take 1 capsule by mouth Daily with supper 60 capsule 3    silver sulfADIAZINE (SILVADENE) 1 % cream Apply to BL lower leg ulcers daily 20 g 0    carvedilol (COREG) 25 MG tablet Take 1 tablet by mouth 2 times daily (with meals) 180 tablet 3    nitroGLYCERIN (NITROSTAT) 0.4 MG SL tablet Place 1 tablet under the tongue every 5 minutes as needed 25 tablet 1    polyethylene glycol (MIRALAX) packet Take 17 g by mouth daily.  fluticasone (FLONASE) 50 MCG/ACT nasal spray 1 spray by Nasal route nightly as needed.  aspirin 81 MG chewable tablet Take 1 tablet by mouth daily. 30 tablet     isosorbide mononitrate (IMDUR) 30 MG CR tablet Take 1 tablet by mouth daily. 30 tablet 0    ferrous sulfate 325 (65 FE) MG tablet Take 325 mg by mouth 3 times daily (with meals).  omeprazole (PRILOSEC) 20 MG capsule Take 20 mg by mouth 2 times daily. No current facility-administered medications for this visit.         Review of Systems     REVIEW OF SYSTEMS  The following systems were reviewed and revealed the following in addition to any already discussed in the HPI:    CONSTITUTIONAL: No weight loss, no fever, no night sweats, no chills  EYES: no vision changes, no blurry vision  EARS: no changes in hearing, aural fullness  NOSE: no epistaxis, no rhinorrhea  RESPIRATORY: No difficulty breathing, no shortness of breath  CV: no chest pain, no peripheral vascular disease  HEME: No coagulation disorder, no bleeding disorder  NEURO: No TIA or stroke-like symptoms  SKIN: No new rashes in the head and neck, no recent skin cancers  MOUTH: Delete no new ulcers, no recent teeth infections  GASTROINTESTINAL: No diarrhea, stomach pain  PSYCH: No anxiety, no depression    PhysicalExam     Vitals:    12/15/20 1343   BP: 120/66   Site: Left Upper Arm   Position: Sitting   Cuff Size: Large Adult   Pulse: 98   Temp: 97.8 °F (36.6 °C)   TempSrc: Infrared   Weight: (!) 309 lb (140.2 kg)   Height: 5' 8\" (1.727 m)       PHYSICAL EXAM  /66 (Site: Left Upper Arm, Position: Sitting, Cuff Size: Large Adult)   Pulse 98   Temp 97.8 °F (36.6 °C) (Infrared)   Ht 5' 8\" (1.727 m)   Wt (!) 309 lb (140.2 kg)   BMI 46.98 kg/m²     GENERAL: No Acute Distress, Alert and Oriented, no hoarseness  EYES: EOMI, Anti-icteric  NOSE: On anterior rhinoscopy there is no epistaxis, nasal mucosa within normal limits, no purulent drainage  EARS: Normal external appearance; on portable otomicroscopy:  -Right ear: External auditory canal without stenosis, tympanic membrane clear, no middle ear effusions or retractions  -Left ear: External auditory canal without stenosis, tympanic membrane perforation noted  FACE: 1/6 House-Brackmann Scale, symmetric, sensation equal bilaterally  ORAL CAVITY: No masses or lesions palpated, uvula is midline, moist mucous membranes, tongue midline/mobile  NECK: Normal range of motion, no thyromegaly, trachea is midline, no lymphadenopathy, no neck masses, no crepitus  CHEST: Normal respiratory effort, no retractions, breathing comfortably  SKIN: No rashes, normal appearing skin, no evidence of skin lesions/tumors  NEURO: CN 2, 3, 4, 5, 6, 7, 11, 12 intact bilaterally       Procedure     Binocular Otomicroscopy     Pre op: Left fungal otitis externa  Post op: Left fungal otitis externa, tympanic membrane perforation  Procedure : Binocular otomicroscopy  Surgeon: RODRIGUEZ Bhakta  Estimated Blood Loss: None    After obtaining consent, the patient was placed in the examination chair in the reclined position.      -Left ear: External auditory canal with slight stenosis, tympanic membrane showing 40-50% central perforation of the anterior daksha-tympanum, round window niche and umbo appreciated, the malleus manubrium appears medialized and disarticulated from the drum     * Patient tolerated the procedure well with no complications      Assessment and Plan     1. Otitis externa, fungal, left ear  Resolved    2.  Mixed conductive and sensorineural hearing loss of left ear with restricted hearing of right ear  Patient with mixed hearing loss of left ear, with significant conductive gap-likely due to left tympanic membrane perforation but may also have an element of ossicular chain discontinuity/fixation. His tympanic membrane remains perforated, and we discussed tympanoplasty with possible acicular chain reconstruction. The patient is concerned about the ongoing pandemic, given his history of congestive heart failure. He would like to wait several months before proceeding with surgery, and we will see him back in 3 months for further evaluation    3. Tympanic membrane perforation, left  Left tympanic membrane, 40-50%, central-no cholesteatoma visualized in the middle ear, we will see patient back in approximately 3 months to determine if he would like to proceed with tympanoplasty    Return in about 3 months (around 3/15/2021). Samy Johns MD  [facility]  Department of Otolaryngology/Head and Neck Surgery  12/15/20    I have performed a head and neck physical exam personally or was physically present during the key or critical portions of the service. Medical Decision Making: The following items were considered in medical decision making:  Independent review of images  Review / order clinical lab tests  Review / order radiology tests  Decision to obtain old records  Review and summation of old records as accessed through [facility] (a summary of my findings in these old records: None)     Portions of this note were dictated using Dragon.  There may be linguistic errors secondary to the use of this program.

## 2020-12-15 NOTE — TELEPHONE ENCOUNTER
Patient cancelled appointment on 2/21/18 with Rafael for 31-90 day follow up. Reason: He has home health nurse coming tomorrow, he said we waited too long to call him for reminder, he said we should have called yesterday so he could cancel the home nurse visit. Patient did not reschedule appointment. He is asking for a call back to discuss where he can be worked back in to the schedule. Last OV 8/22/17    Assessment:       · Snoring  · Fatigue  · Irregular sleep cycle    · Inadequate sleep hygiene   · Chronic pain syndrome on Percocet   · DHF, CAD post CABG and Pulm HTN      Plan:       · PSG evaluate for sleep related breathing disorder. Treatment options were discussed with patient if PSG reveals HENNA, including CPAP therapy, oral appliances, upper airway surgery and hypoglossal nerve stimulation. · Discussed with patient the pathophysiology of apnea. · Sleep hygiene  · Cognitive behavioral therapy was discussed with patient including stimulus control and sleep restriction. · Avoid sedatives, alcohol and caffeinated drinks at bed time. · No driving motorized vehicles or operating heavy machinery while fatigue, drowsy or sleepy. · Weight loss is also recommended as a long-term intervention. · Complications of HENNA if not treated were discussed with patient patient to include systemic hypertension, pulmonary hypertension, cardiovascular morbidities, car accidents and all cause mortality. Subjective:      Patient ID: Jaime Lund is a 50 y.o. male.    Chief Complaint:Follow-up      History of Present Illness    A 50-year-old man with lumbar spine fusion, intrathecal pain pin, s/p pain pump extraction due to MSSA infection, post operative wound infection with abscess formation who is seen as a follow up for ongoing management. Mr. Lund had IR drain tube exchange on 12/9 where he was noted to have persistent abscess cavity. He had drain replacement however since then has had very little output. No issues with his PICC line and is tolerating his antibiotic therapy.     Review of Systems   Constitution: Negative for chills, decreased appetite, fever, malaise/fatigue, night sweats, weight gain and weight loss.   HENT: Negative for congestion, ear pain, hearing loss, hoarse voice, sore throat and tinnitus.    Eyes: Negative for blurred vision, redness and visual disturbance.   Cardiovascular: Negative for chest pain, leg swelling and palpitations.   Respiratory: Negative for cough, hemoptysis, shortness of breath and sputum production.    Hematologic/Lymphatic: Negative for adenopathy. Does not bruise/bleed easily.   Skin: Negative for dry skin, itching, rash and suspicious lesions.   Musculoskeletal: Positive for back pain. Negative for joint pain, myalgias and neck pain.   Gastrointestinal: Negative for abdominal pain, constipation, diarrhea, heartburn, nausea and vomiting.   Genitourinary: Negative for dysuria, flank pain, frequency, hematuria, hesitancy and urgency.   Neurological: Negative for dizziness, headaches, numbness, paresthesias and weakness.   Psychiatric/Behavioral: Negative for depression and memory loss. The patient does not have insomnia and is not nervous/anxious.      Objective:   Physical Exam  Vitals signs reviewed.   Constitutional:       Appearance: He is well-developed.   HENT:      Head: Normocephalic and atraumatic.      Right Ear: External ear normal.      Left Ear:  External ear normal.   Eyes:      Conjunctiva/sclera: Conjunctivae normal.   Neck:      Musculoskeletal: Normal range of motion and neck supple.      Thyroid: No thyromegaly.   Cardiovascular:      Rate and Rhythm: Normal rate and regular rhythm.      Heart sounds: Normal heart sounds. No murmur.   Pulmonary:      Effort: Pulmonary effort is normal.      Breath sounds: Normal breath sounds. No wheezing or rales.   Abdominal:      General: Bowel sounds are normal.      Palpations: Abdomen is soft. There is no mass.      Tenderness: There is no abdominal tenderness. There is no rebound.      Comments: Drain exiting midline abdomen. Minimal serosanguinous fluid in collection bag.   Musculoskeletal: Normal range of motion.      Comments: RUE PICC in place. Dressing edges rolling upward.   Lymphadenopathy:      Cervical: No cervical adenopathy.   Skin:     General: Skin is warm and dry.   Neurological:      Mental Status: He is alert and oriented to person, place, and time.   Psychiatric:         Behavior: Behavior normal.             Assessment:       1. Surgical wound infection    2. MSSA (methicillin susceptible Staphylococcus aureus) infection          Plan:   Patient with abscess formation post pain pump removal. S/P IR drain placement and exchange on 12/9. Abscess cavity still noted. CT done on 12/7 showed abscess size to be 7.1 x 3.2 x 5.8 cm. He does not have drainage in tube bag.   · Continue cefazolin.   · Will plan for a full 6 week course. EOC: 1/8/2021.  · May need to consider repeat imaging at follow up.   · Pending laboratory work up and PICC dressing change tomorrow.   · Follow up with IR for drain tube management.   · RTC in January for EOC.

## 2021-01-04 ENCOUNTER — TELEPHONE (OUTPATIENT)
Dept: PULMONOLOGY | Age: 66
End: 2021-01-04

## 2021-01-04 ENCOUNTER — TELEPHONE (OUTPATIENT)
Dept: ENT CLINIC | Age: 66
End: 2021-01-04

## 2021-01-04 DIAGNOSIS — G47.33 SEVERE OBSTRUCTIVE SLEEP APNEA: Primary | ICD-10-CM

## 2021-01-04 DIAGNOSIS — Z71.89 ENCOUNTER FOR BIPAP USE COUNSELING: ICD-10-CM

## 2021-01-04 NOTE — TELEPHONE ENCOUNTER
Patient calling to have script for new machine sent to [facility] as patient's machine is broken and old (stopped working yesterday). LOV: 4/8/20  Assessment:       · Severe HENNA. BIPAP 14/8 cm H2O  Optimal compliance on review today. · Hypersomnia- sedating medications, poor sleep hygiene, co morbid conditions, likely contributing-improving. · Irregular sleep pattern due to pain  · Poor sleep hygiene  · Obesity   · Cardiomyopathy, CAD s/p CABG x3, pulmonary HTN, neuropathy, DM, HTN  · Chronic pain- followed by pain management.     Plan:       -Continue BiPAP 14/8 cm [id number] to increase use  -Discussed severity of sleep apnea and importance of BiPAP use  -airfit P10 large currently- can change masks if needed  -Supply order to DME of patient's choice  -Chin strap if not using full face mask  - Advised to use BiPAP 6-8 hrs at night and during naps-continue to increase use. - Replacement of mask, tubing, head straps every 3-6 months or sooner if damaged. - Patient instructed to contact DME company for any mask, tubing or machine trouble shooting if problems arise.  - Sleep hygiene  -Continue to work with pain management for chronic pain  -Cognitive behavioral therapy was discussed with patient including stimulus control and sleep restriction   -Recommend set bed/wake times, no naps during the daytime. Use BiPAP when sleeping.  - Avoid sedatives, alcohol and caffeinated drinks at bed time. - Patient counseled to never drive or operate heavy machinery while fatigue, drowsy or sleepy- patient verbalized understanding and agrees.    - Weight loss is recommended as a long-term intervention.    - Complications of HENNA if not treated were discussed with patient patient, including: systemic hypertension, pulmonary hypertension, cardiovascular morbidities, car accidents and all cause mortality.  -Patient education regarding sleep tips and PAP cleaning recommendations

## 2021-01-04 NOTE — TELEPHONE ENCOUNTER
Patient called to state he is experiencing some irritation in his ear and would like to know what  [facility] recommends him to do.  Please call patient at [phone]

## 2021-01-05 NOTE — TELEPHONE ENCOUNTER
Returned call to patient at home - he states left ear fullness with slight discomfort - no nelida pain, no otorrhea. He is concerned that water in his ear after showering is causing this (he uses q-tips) - we asked him to obtain silicon earplugs for use while showering, which he stated he would purchase. If symptoms are not improved he states he will call us in 1 week. Otherwise, we have made an appointment for him in 3 weeks.

## 2021-01-06 NOTE — TELEPHONE ENCOUNTER
Patient called with message left for patient to call back to office. Pt needs to be scheduled for a 31-90    Order pended.

## 2021-01-12 NOTE — TELEPHONE ENCOUNTER
Called and spoke to pt to schedule 31-90 day follow up. Patient said that Vanessa Rojo from [facility] came to his house and explained to him that he is not eligible for a new machine as he rec'd last machine 12/1/2017. Vanessa Rojo explained to patient that due to his insurance he will need to send his machine for repairs and cost of repairs. If machine is broken beyond repair or repair costs are more than worth of machine pt will be eligible. It is the patients responsibility to follow up with [employer] and then [employer] will follow up with office.

## 2021-01-28 ENCOUNTER — TELEPHONE (OUTPATIENT)
Dept: PULMONOLOGY | Age: 66
End: 2021-01-28

## 2021-01-28 NOTE — TELEPHONE ENCOUNTER
Patient called requesting an order for new cpap machine that his is broken that blower is not working. Patient sent for repair and will cost $240.00. Spoke with [employer] they will need copy of estimated repair, Office Visit notes stating whats wrong with machine, age of machine, and that patient is continuing to benefit from machine. Patient states that he will have estimate faxed to office. Assessment: 4/8/20      · Severe HENNA. BIPAP 14/8 cm H2O  Optimal compliance on review today. · Hypersomnia- sedating medications, poor sleep hygiene, co morbid conditions, likely contributing-improving. · Irregular sleep pattern due to pain  · Poor sleep hygiene  · Obesity   · Cardiomyopathy, CAD s/p CABG x3, pulmonary HTN, neuropathy, DM, HTN  · Chronic pain- followed by pain management.     Plan:       -Continue BiPAP 14/8 cm [id number] to increase use  -Discussed severity of sleep apnea and importance of BiPAP use  -airfit P10 large currently- can change masks if needed  -Supply order to DME of patient's choice  -Chin strap if not using full face mask  - Advised to use BiPAP 6-8 hrs at night and during naps-continue to increase use. - Replacement of mask, tubing, head straps every 3-6 months or sooner if damaged. - Patient instructed to contact DME company for any mask, tubing or machine trouble shooting if problems arise.  - Sleep hygiene  -Continue to work with pain management for chronic pain  -Cognitive behavioral therapy was discussed with patient including stimulus control and sleep restriction   -Recommend set bed/wake times, no naps during the daytime. Use BiPAP when sleeping.  - Avoid sedatives, alcohol and caffeinated drinks at bed time. - Patient counseled to never drive or operate heavy machinery while fatigue, drowsy or sleepy- patient verbalized understanding and agrees.    - Weight loss is recommended as a long-term intervention.    - Complications of HENNA if not treated were discussed with patient patient, including: systemic hypertension, pulmonary hypertension, cardiovascular morbidities, car accidents and all cause mortality.  -Patient education regarding sleep tips and PAP cleaning recommendations

## 2021-02-01 ENCOUNTER — TELEPHONE (OUTPATIENT)
Dept: PULMONOLOGY | Age: 66
End: 2021-02-01

## 2021-02-01 ENCOUNTER — VIRTUAL VISIT (OUTPATIENT)
Dept: PULMONOLOGY | Age: 66
End: 2021-02-01
Payer: MEDICARE

## 2021-02-01 DIAGNOSIS — Z71.89 ENCOUNTER FOR BIPAP USE COUNSELING: ICD-10-CM

## 2021-02-01 DIAGNOSIS — F51.04 PSYCHOPHYSIOLOGICAL INSOMNIA: ICD-10-CM

## 2021-02-01 DIAGNOSIS — Z72.821 POOR SLEEP HYGIENE: ICD-10-CM

## 2021-02-01 DIAGNOSIS — G89.4 CHRONIC PAIN SYNDROME: ICD-10-CM

## 2021-02-01 DIAGNOSIS — G47.33 SEVERE OBSTRUCTIVE SLEEP APNEA: Primary | ICD-10-CM

## 2021-02-01 DIAGNOSIS — E66.01 OBESITY, CLASS III, BMI 40-49.9 (MORBID OBESITY) (HCC): ICD-10-CM

## 2021-02-01 PROCEDURE — 99442 PR PHYS/QHP TELEPHONE EVALUATION 11-20 MIN: CPT | Performed by: NURSE PRACTITIONER

## 2021-02-01 ASSESSMENT — SLEEP AND FATIGUE QUESTIONNAIRES
ESS TOTAL SCORE: 10
HOW LIKELY ARE YOU TO NOD OFF OR FALL ASLEEP WHILE SITTING AND TALKING TO SOMEONE: 0

## 2021-02-01 NOTE — PATIENT INSTRUCTIONS

## 2021-02-01 NOTE — TELEPHONE ENCOUNTER
Narinder Starr MA         2:51 PM  Note     Patient called requesting an order for new cpap machine that his is broken that blower is not working. Patient sent for repair and will cost $240.00. Spoke with [facility] they will need copy of estimated repair, Office Visit notes stating whats wrong with machine, age of machine, and that patient is continuing to benefit from machine. Patient states that he will have estimate faxed to office. Pt had a visit with Екатерина Puckett today. I will send over all needed information to [facility]. Pt is to call and get a copy of the estimated cost of repairs for BiPAP machine as [facility] needs this information. Please follow up.

## 2021-02-01 NOTE — TELEPHONE ENCOUNTER
.Within this Telehealth Consent, the terms you and yours refer to the person using the Telehealth Service (Service), or in the case of a use of the Service by or on behalf of a minor, you and yours refer to and include (i) the parent or legal guardian who provides consent to the use of the Service by such minor or uses the Service on behalf of such minor, and (ii) the minor for whom consent is being provided or on whose behalf the Service is being utilized. When using Service, you will be consulting with your health care providers via the use of Telehealth.   Telehealth involves the delivery of healthcare services using electronic communications, information technology or other means between a healthcare provider and a patient who are not in the same physical location. Telehealth may be used for diagnosis, treatment, follow-up and/or patient education, and may include, but is not limited to, one or more of the following:    Electronic transmission of medical records, photo images, personal health information or other data between a patient and a healthcare provider    Interactions between a patient and healthcare provider via audio, video and/or data communications    Use of output data from medical devices, sound and video files    Anticipated Benefits   The use of Telehealth by your Provider(s) through the Service may have the following possible benefits:    Making it easier and more efficient for you to access medical care and treatment for the conditions treated by such Provider(s) utilizing the Service    Allowing you to obtain medical care and treatment by Provider(s) at times that are convenient for you    Enabling you to interact with Provider(s) without the necessity of an in-office appointment     Possible Risks   While the use of Telehealth can provide potential benefits for you, there are also potential risks associated with the use of Telehealth.  These risks include, but may not be the provision of medical care and treatment via Telehealth and the Service and you may not be able to receive diagnosis and/or treatment through the Service for every condition for which you seek diagnosis and/or treatment. 11. There are potential risks to the use of Telehealth, including but not limited to the risks described in this Telehealth Consent. 12. Your Provider(s) have discussed the use of Telehealth and the Service with you, including the benefits and risks of such and you have provided oral consent to your Provider(s) for the use of Telehealth and the Service. 15. You understand that it is your duty to provide your Provider(s) truthful, accurate and complete information, including all relevant information regarding care that you may have received or may be receiving from other healthcare providers outside of the Service. 14. You understand that each of your Provider(s) may determine in his or sole discretion that your condition is not suitable for diagnosis and/or treatment using the Service, and that you may need to seek medical care and treatment a specialist or other healthcare provider, outside of the Service. 15. You understand that you are fully responsible for payment for all services provided by Provider(s) or through use of the Service and that you may not be able to use third-party insurance. 16. You represent that (a) you have read this Telehealth Consent carefully, (b) you understand the risks and benefits of the Service and the use of Telehealth in the medical care and treatment provided to you by Provider(s) using the Service, and (c) you have the legal capacity and authority to provide this consent for yourself and/or the minor for which you are consenting under applicable federal and state laws, including laws relating to the age of [de-identified] and/or parental/guardian consent.    17. You give your informed consent to the use of Telehealth by Provider(s) using the Service under the terms described in the Terms of Service and this Telehealth Consent. The patient was read the following statement and has consented to the visit as of 2/1/21. The patient has been scheduled for their first telehealth visit on 2/1/21 with Karine Ayala.

## 2021-02-01 NOTE — PROGRESS NOTES
Patient ID: Jf Shirley is a 76 y.o. male who is being seen today for   Chief Complaint   Patient presents with    Sleep Apnea     discuss new machine         HPI:   Jf Shirley is a 76 y.o. male for telephone only virtual visit for HENNA follow up. States his BIPAP stopped working about 1 month ago (he does have a loaner currently from [facility]). States on/off button would not work. States he sent it away to be repaied. He states he was told the blower went out and was told it would be about $240 to fix it. States he is unable to pay that much money for the repair. He is requesting new BIPAP. He has had current BIPAP about 3.5 years. States he cannot sleep without BIPAP. He is using and benefiting from BIPAP. Patient is using BiPAP 4-6 hrs/night. Using humidifier. No snoring on BiPAP. The pressure is well tolerated. The mask is comfortable- nasal pillows. No mask leak. Some daytime sleepiness, medications, poor sleep hygiene. No driving. +fatigue. Bedtimes and wakes times vary due to pain. Sleep onset is 5-10 minutes. Wakes up 3-4 times at night total.  STates has to sleep in increments due to pain. 3-4 nocturia. It takes few- unknown minutes to fall back a sleep. Multiple naps during the day. Rare headache in am. No weight gain. 1-2 caffienated beverages during the day. No alcohol. ESS is 10. Previous HPI 4/8/20  Jf Shirley is a 76 y.o. male for telephone only virtual visit for HENNA follow up. States he is doing better with BIPAP. Patient is using BiPAP 3-5 hrs/night. Using humidifier. No snoring on BiPAP. The pressure is well tolerated. The mask is comfortable-nasal pillows. No mask leak. States he might want to change DME. Some daytime sleepiness- pain meds, chronic conditions contributing. No nodding off when driving. No dry nose or throat.  +fatigue. Bedtime is variable. States does not keep a set schedule.   States pain pain can affect his sleep, can only sleep 3-4 hours at a Disp: 29.57 mL, Rfl: 1    acetaminophen (TYLENOL) 500 MG tablet, Take 2 tablets by mouth 4 times daily as needed for Pain, Disp: 60 tablet, Rfl: 0    hydrocortisone (ANUSOL-HC) 2.5 % rectal cream, Place rectally 2 times daily. , Disp: 1 Tube, Rfl: 0    senna (SENOKOT) 8.6 MG TABS tablet, Take 1 tablet by mouth 2 times daily, Disp: 120 tablet, Rfl: 0    colchicine (COLCRYS) 0.6 MG tablet, Take 1 tablet by mouth daily, Disp: 30 tablet, Rfl: 3    docusate sodium (COLACE, DULCOLAX) 100 MG CAPS, Take 100 mg by mouth 2 times daily, Disp: , Rfl:     metolazone (ZAROXOLYN) 5 MG tablet, Take 1 tablet by mouth as needed (weight gain > 3#), Disp: 10 tablet, Rfl: 0    torsemide (DEMADEX) 100 MG tablet, Take 1 tablet by mouth 2 times daily, Disp: 30 tablet, Rfl: 3    insulin aspart (NOVOLOG) 100 UNIT/ML injection vial, Inject into the skin 3 times daily (before meals) 50 units with breakfast, 20 units with lunch and 60 units with dinner, Disp: , Rfl:     allopurinol (ZYLOPRIM) 100 MG tablet, Take 300 mg by mouth , Disp: , Rfl:     atorvastatin (LIPITOR) 40 MG tablet, Take 1 tablet by mouth daily, Disp: 90 tablet, Rfl: 3    insulin glargine (LANTUS) 100 UNIT/ML injection vial, Inject 55 Units into the skin 2 times daily (Patient taking differently: Inject 80 Units into the skin 2 times daily ), Disp: 1 vial, Rfl: 1    Compression Stockings MISC, by Does not apply route 2 pair, knee high compression stockings, fitted/ zippered, Disp: 2 each, Rfl: 1    pregabalin (LYRICA) 50 MG capsule, Take 1 capsule by mouth Daily with supper, Disp: 60 capsule, Rfl: 3    silver sulfADIAZINE (SILVADENE) 1 % cream, Apply to BL lower leg ulcers daily, Disp: 20 g, Rfl: 0    carvedilol (COREG) 25 MG tablet, Take 1 tablet by mouth 2 times daily (with meals), Disp: 180 tablet, Rfl: 3    nitroGLYCERIN (NITROSTAT) 0.4 MG SL tablet, Place 1 tablet under the tongue every 5 minutes as needed, Disp: 25 tablet, Rfl: 1    polyethylene glycol (MIRALAX) packet, Take 17 g by mouth daily. , Disp: , Rfl:     fluticasone (FLONASE) 50 MCG/ACT nasal spray, 1 spray by Nasal route nightly as needed. , Disp: , Rfl:     aspirin 81 MG chewable tablet, Take 1 tablet by mouth daily. , Disp: 30 tablet, Rfl:     isosorbide mononitrate (IMDUR) 30 MG CR tablet, Take 1 tablet by mouth daily. , Disp: 30 tablet, Rfl: 0    ferrous sulfate 325 (65 FE) MG tablet, Take 325 mg by mouth 3 times daily (with meals). , Disp: , Rfl:     omeprazole (PRILOSEC) 20 MG capsule, Take 20 mg by mouth 2 times daily. , Disp: , Rfl:     [START ON 2/26/2021] oxyCODONE-acetaminophen (PERCOCET) 5-325 MG per tablet, Take 1 tablet by mouth 4 times daily for 30 days. (Patient not taking: Reported on 2/1/2021), Disp: 120 tablet, Rfl: 0    naloxone 4 MG/0.1ML LIQD nasal spray, 1 spray by Nasal route as needed for Opioid Reversal (Patient not taking: Reported on 2/1/2021), Disp: 1 each, Rfl: 0    naloxone 4 MG/0.1ML LIQD nasal spray, 1 spray by Nasal route as needed for Opioid Reversal (Patient not taking: Reported on 2/1/2021), Disp: 1 each, Rfl: 0    potassium chloride (KLOR-CON M) 20 MEQ TBCR extended release tablet, Take 1 tablet by mouth 2 times daily (Patient not taking: Reported on 2/1/2021), Disp: 60 tablet, Rfl: 3      Objective:   PHYSICAL EXAM:    There were no vitals taken for this visit. DATA:   9/28 17 PSG AHI 35.4/REM AHI 45.7, PLMS 38, low SPO2 73%  11/16/17 titrationcontrolled sleep-related breathing with BiPAP, PLMS 86.8 recommendations BiPAP 14/8 cm H2O    BIPAP compliance data:  Compliance download report from 3/4/18 to 4/2/18 reviewed today by me and showed patient is using machine 3:23 hrs/night with 30% compliance and AHI 0.8 within this time frame. 9/30days with greater than 4 hours of machine use. 30/30days with any BIPAP use  BIPAP 14/8 cm H20    Compliance download report from 4/17/18 to 5/16/18 showed patient is using machine 5:27 hrs/night with 63.3% compliance and AHI 5.1 within this time frame. 19/30days with greater than 4 hours of machine use. BIPAP 14/8 cm H20    Compliance download report from 6/11/18 to 7/10/18 showed patient is using machine 6:42 hrs/night with 100% compliance and AHI 1.4 within this time frame. 30/30days with greater than 4 hours of machine use. BIPAP 14/8 cm H20    Compliance download report from 3/2/19 to 3/31/19 showed patient is using machine 14 min/night with 0% compliance and AHI 5.5 within this time frame. 0/30days with greater than 4 hours of machine use. BIPAP 14/8 cm H20    Compliance download report from 8/4/19 to 9/2/19 showed patient is using machine 2:47 hrs/night with 10% compliance and AHI 1.1 within this time frame. 3/30days with greater than 4 hours of machine use. 29/30 days with any BIPAP use. BIPAP 14/8 cm H20    Compliance download report from 3/4/20 to 4/3/20 reviewed today by me and showed patient is using machine 4:58 hrs/night with 74.2% compliance and AHI 1.2 within this time frame. 21/31 days with greater than 4 hours of machine use. BIPAP 14/8 cm H20    2/1/2021unable to obtain BiPAP download report. Patient's current BiPAP is broken, he does have loaner from DME. Assessment:      Severe HENNA. BIPAP 14/8 cm H2O  Compliant per patient , needs new BIPAP  · Hypersomnia- sedating medications, poor sleep hygiene, co morbid conditions, likely contributing-improving. · Irregular sleep pattern due to pain  · Poor sleep hygiene  · Obesity   · Cardiomyopathy, CAD s/p CABG x3, pulmonary HTN, neuropathy, DM, HTN  · Chronic pain- followed by pain management. Plan:      -Order new BiPAP 14/8 cm H2O  -Discussed severity of sleep apnea and importance of BiPAP use  airfit P10 large currently- can change masks if needed  -Supply order to DME of patient's choice  -Chin strap if not using full face mask  - Advised to use BiPAP 6-8 hrs at night and during naps-continue to increase use.   - Replacement of mask, tubing, head straps every 3-6 months or sooner if damaged. - Patient instructed to contact [employer] for any mask, tubing or machine trouble shooting if problems arise.  - Sleep hygiene  -Continue to work with pain management for chronic pain  -Cognitive behavioral therapy was discussed with patient including stimulus control and sleep restriction   -Recommend set bed/wake times, no naps during the daytime. Use BiPAP when sleeping.  - Avoid sedatives, alcohol and caffeinated drinks at bed time. - Patient counseled to never drive or operate heavy machinery while fatigue, drowsy or sleepy- patient verbalized understanding and agrees. - Weight loss is recommended as a long-term intervention.    - Complications of HENNA if not treated were discussed with patient patient, including: systemic hypertension, pulmonary hypertension, cardiovascular morbidities, car accidents and all cause mortality.  -Patient education regarding sleep tips and PAP cleaning recommendations     Consent for telehealth visit was obtained and is noted in chart    Pod Koko Finn is a 76 y.o. male evaluated via telephone on 2/1/2021. Consent:  He and/or health care decision maker is aware that that he may receive a bill for this telephone service, depending on his insurance coverage, and has provided verbal consent to proceed: Yes          I affirm this is a Patient Initiated Episode with a Patient who has not had a related appointment within my department in the past 7 days or scheduled within the next 24 hours.     Patient identification was verified at the start of the visit: Yes    Total Time: minutes: 21-30 minutes    Note: not billable if this call serves to triage the patient into an appointment for the relevant concern      [address]

## 2021-02-09 NOTE — TELEPHONE ENCOUNTER
Spoke to Marko at [facility] and she said that the RT has to make a home visit, pt is scheduled for a home visit on 2/12/21.

## 2021-02-18 NOTE — TELEPHONE ENCOUNTER
Spoke to Kristin Kohler and she said that the pt was set up with a new machine when the RT went out for the home visit.

## 2021-02-27 ENCOUNTER — TELEPHONE (OUTPATIENT)
Dept: OTOLARYNGOLOGY | Age: 66
End: 2021-02-27

## 2021-02-27 RX ORDER — AMOXICILLIN 500 MG/1
500 CAPSULE ORAL 2 TIMES DAILY
Qty: 14 CAPSULE | Refills: 0 | Status: SHIPPED | OUTPATIENT
Start: 2021-02-27 | End: 2021-03-06

## 2021-02-27 NOTE — TELEPHONE ENCOUNTER
Pt left message with called service. I called the patient back and spoke with him over the phone. Left ear pain and otorrhea started last night. Similar to pain felt in the past when he was infected. I told him that I would send him an antibiotic eardrop but he said that he did not want that and he wants oral amoxicillin because that is the only thing that helps his ears. In that case I have sent in amoxicillin to his pharmacy. I have told him to take ibuprofen for pain if he is able. And I have told him to call Dr. Jose Narayanan office on Monday to schedule a follow-up for next week.

## 2021-03-05 ENCOUNTER — TELEPHONE (OUTPATIENT)
Dept: ENT CLINIC | Age: 66
End: 2021-03-05

## 2021-03-05 NOTE — TELEPHONE ENCOUNTER
Patient called and wants to talk to Dr. Rita Menezes about getting on some other type of Medication. Patient says that he has draining out of his ear, that never stops.      Please call back at [phone]

## 2021-03-09 ENCOUNTER — OFFICE VISIT (OUTPATIENT)
Dept: ENT CLINIC | Age: 66
End: 2021-03-09
Payer: MEDICARE

## 2021-03-09 VITALS — TEMPERATURE: 97.2 F | BODY MASS INDEX: 45.77 KG/M2 | HEIGHT: 69 IN | WEIGHT: 309 LBS

## 2021-03-09 DIAGNOSIS — H66.015 RECURRENT ACUTE SUPPURATIVE OTITIS MEDIA WITH SPONTANEOUS RUPTURE OF LEFT TYMPANIC MEMBRANE: Primary | ICD-10-CM

## 2021-03-09 PROCEDURE — 3017F COLORECTAL CA SCREEN DOC REV: CPT | Performed by: OTOLARYNGOLOGY

## 2021-03-09 PROCEDURE — G8417 CALC BMI ABV UP PARAM F/U: HCPCS | Performed by: OTOLARYNGOLOGY

## 2021-03-09 PROCEDURE — G8482 FLU IMMUNIZE ORDER/ADMIN: HCPCS | Performed by: OTOLARYNGOLOGY

## 2021-03-09 PROCEDURE — 92504 EAR MICROSCOPY EXAMINATION: CPT | Performed by: OTOLARYNGOLOGY

## 2021-03-09 PROCEDURE — G8427 DOCREV CUR MEDS BY ELIG CLIN: HCPCS | Performed by: OTOLARYNGOLOGY

## 2021-03-09 PROCEDURE — 1123F ACP DISCUSS/DSCN MKR DOCD: CPT | Performed by: OTOLARYNGOLOGY

## 2021-03-09 PROCEDURE — 4040F PNEUMOC VAC/ADMIN/RCVD: CPT | Performed by: OTOLARYNGOLOGY

## 2021-03-09 PROCEDURE — 1036F TOBACCO NON-USER: CPT | Performed by: OTOLARYNGOLOGY

## 2021-03-09 RX ORDER — CIPROFLOXACIN AND DEXAMETHASONE 3; 1 MG/ML; MG/ML
4 SUSPENSION/ DROPS AURICULAR (OTIC) 2 TIMES DAILY
Qty: 7.5 ML | Refills: 0 | Status: SHIPPED | OUTPATIENT
Start: 2021-03-09 | End: 2021-03-14

## 2021-03-09 RX ORDER — CIPROFLOXACIN AND DEXAMETHASONE 3; 1 MG/ML; MG/ML
4 SUSPENSION/ DROPS AURICULAR (OTIC) 2 TIMES DAILY
Qty: 7.5 ML | Refills: 0 | Status: SHIPPED | OUTPATIENT
Start: 2021-03-09 | End: 2021-03-09

## 2021-03-09 NOTE — PROGRESS NOTES
[address] UP VISIT      Patient Name: Masha Watts  Medical Record Number:  [id number]  Primary Care Physician:  Amari Tijerina MD    ChiefComplaint     Chief Complaint   Patient presents with    Ear Problem     Patient complains of left sided otorrhea that started a couple of weeks ago. History of Present Illness     Shay Elizondo is an 76 y.o. male previously seen for left sided headache and drainage with suspected acute otitis media by Dr. Qasim Romero. Interval History 10/5/2020: Since last visit, states significantly improved otalgia without otorrhea; denies headache. Continues to have left-sided aural fullness and left-sided hearing loss. No vertigo, fevers, chills, night sweats. Patient is a diabetic, last A1c 6.5 on 9/20/2019. Interval history 10/15/2020: Since last visit, patient states minimal otorrhea from the left ear and resolution of pruritus. Continues to state poor hearing in the left ear. Intermittent head congestion, has been taking Excedrin which improves this. Interval history 12/15/2020: No interval pruritus, pain, otorrhea. Continues to have poor hearing in the left ear-states he is left-handed, and has difficulty using the telephone in the left ear. Interval history 3/9/2021: Patient with left ear otorrhea/pain that has been ongoing for the past two weeks. Improvement with course of Augmentin, however continued mild pain and drainage.      Past Medical History     Past Medical History:   Diagnosis Date    Anemia     Angina     Arthritis     CAD (coronary artery disease)     [facility]     CKD (chronic kidney disease) stage 3, GFR 30-59 ml/min     Clostridium difficile diarrhea 3/16/12; 2/29/12    positive stool toxin    Diabetes mellitus ([address]     Diabetic neuropathy ([facility] [address] [address]     feet and legs    Disease of blood and blood forming organ     Dizziness     When he tablet by mouth 4 times daily for 30 days. 120 tablet 0    naloxone 4 MG/0.1ML LIQD nasal spray 1 spray by Nasal route as needed for Opioid Reversal 1 each 0    clotrimazole (LOTRIMIN) 1 % external solution Apply 4 drops to the left ear twice daily (morning and evening) for 10 days 29.57 mL 1    acetaminophen (TYLENOL) 500 MG tablet Take 2 tablets by mouth 4 times daily as needed for Pain 60 tablet 0    hydrocortisone (ANUSOL-HC) 2.5 % rectal cream Place rectally 2 times daily.  1 Tube 0    senna (SENOKOT) 8.6 MG TABS tablet Take 1 tablet by mouth 2 times daily 120 tablet 0    naloxone 4 MG/0.1ML LIQD nasal spray 1 spray by Nasal route as needed for Opioid Reversal 1 each 0    colchicine (COLCRYS) 0.6 MG tablet Take 1 tablet by mouth daily 30 tablet 3    docusate sodium (COLACE, DULCOLAX) 100 MG CAPS Take 100 mg by mouth 2 times daily      metolazone (ZAROXOLYN) 5 MG tablet Take 1 tablet by mouth as needed (weight gain > 3#) 10 tablet 0    potassium chloride (KLOR-CON M) 20 MEQ TBCR extended release tablet Take 1 tablet by mouth 2 times daily 60 tablet 3    torsemide (DEMADEX) 100 MG tablet Take 1 tablet by mouth 2 times daily 30 tablet 3    insulin aspart (NOVOLOG) 100 UNIT/ML injection vial Inject into the skin 3 times daily (before meals) 50 units with breakfast, 20 units with lunch and 60 units with dinner      allopurinol (ZYLOPRIM) 100 MG tablet Take 300 mg by mouth       atorvastatin (LIPITOR) 40 MG tablet Take 1 tablet by mouth daily 90 tablet 3    insulin glargine (LANTUS) 100 UNIT/ML injection vial Inject 55 Units into the skin 2 times daily (Patient taking differently: Inject 80 Units into the skin 2 times daily ) 1 vial 1    Compression Stockings MISC by Does not apply route 2 pair, knee high compression stockings, fitted/ zippered 2 each 1    pregabalin (LYRICA) 50 MG capsule Take 1 capsule by mouth Daily with supper 60 capsule 3    silver sulfADIAZINE (SILVADENE) 1 % cream Apply to BL there is no epistaxis, nasal mucosa within normal limits, no purulent drainage  EARS: Normal external appearance; on portable otomicroscopy:  -Right ear: External auditory canal without stenosis, tympanic membrane clear, no middle ear effusions or retractions  -Left ear: External auditory canal without stenosis, purulent otorrhea and left central tympanic membrane perforation noted  FACE: 1/6 House-Brackmann Scale, symmetric, sensation equal bilaterally  ORAL CAVITY: No masses or lesions palpated, uvula is midline, moist mucous membranes, tongue midline/mobile  NECK: Normal range of motion, no thyromegaly, trachea is midline, no lymphadenopathy, no neck masses, no crepitus  CHEST: Normal respiratory effort, no retractions, breathing comfortably  SKIN: No rashes, normal appearing skin, no evidence of skin lesions/tumors  NEURO: CN 2, 3, 4, 5, 6, 7, 11, 12 intact bilaterally       Procedure     Binocular Otomicroscopy     Pre op: Left otitis media  Post op: Left otitis media, tympanic membrane perforation  Procedure : Binocular otomicroscopy  Surgeon: RODRIGUEZ Bhakta  Estimated Blood Loss: None    After obtaining consent, the patient was placed in the examination chair in the reclined position.      -Left ear: External auditory canal with slight stenosis, purulent otorrhea noted, suction, tympanic membrane showing 40-50% central perforation of the anterior daksha-tympanum   * Patient tolerated the procedure well with no complications      Assessment and Plan     1. Otitis ADL  Patient previously presented to our clinic with fungal otitis externa, however today he appears to have suppurative otitis media with purulent secretions draining from the middle ear into the external auditory canal.  We will start him on Ciprodex eardrops twice daily for 5 days, and see him back in 6 weeks for discussion of tympanoplasty. Dry ear precautions have been outlined.     2. Mixed conductive and sensorineural hearing loss of left ear with restricted hearing of right ear  Patient with mixed hearing loss of left ear, with significant conductive gap-likely due to left tympanic membrane perforation but may also have an element of ossicular chain discontinuity/fixation. His tympanic membrane remains perforated, and we discussed tympanoplasty with possible ossicular chain reconstruction. The patient is concerned about the ongoing pandemic, given his history of congestive heart failure. He would like to wait several months before proceeding with surgery, and we will see him back in 3 months for further evaluation    3. Tympanic membrane perforation, left  Left tympanic membrane, 40-50%, central-no cholesteatoma visualized in the middle ear however difficult due to current otitis media    Return in about 6 weeks (around 4/20/2021), or if symptoms worsen or fail to improve. Olivia Kelly MD  [address] [place] [address],[address]  Department of Otolaryngology/Head and Neck Surgery  3/9/21    I have performed a head and neck physical exam personally or was physically present during the key or critical portions of the service. Medical Decision Making: The following items were considered in medical decision making:  Independent review of images  Review / order clinical lab tests  Review / order radiology tests  Decision to obtain old records  Review and summation of [facility] (a summary of my findings in these old records: None)     Portions of this note were dictated using Dragon.  There may be linguistic errors secondary to the use of this program.

## 2021-03-22 ENCOUNTER — TELEPHONE (OUTPATIENT)
Dept: ENT CLINIC | Age: 66
End: 2021-03-22

## 2021-03-22 NOTE — TELEPHONE ENCOUNTER
Call patient-she does not currently have ear drainage, but is worried about drainage in the future; as he does not have any symptoms of acute otitis media/externa, we recommended keeping the ear dry, and waiting until he has otorrhea before we prescribe him eardrops. He agreed to this plan.

## 2021-03-22 NOTE — TELEPHONE ENCOUNTER
Patient would like to know if Dr Sina Reynoso can refill the prescription for ear drops to the [facility] on [address]. Please call patient back at [phone].

## 2021-03-27 ENCOUNTER — TELEPHONE (OUTPATIENT)
Dept: ENT CLINIC | Age: 66
End: 2021-03-27

## 2021-03-27 RX ORDER — AMOXICILLIN AND CLAVULANATE POTASSIUM 875; 125 MG/1; MG/1
1 TABLET, FILM COATED ORAL 2 TIMES DAILY
Qty: 20 TABLET | Refills: 0 | Status: SHIPPED | OUTPATIENT
Start: 2021-03-27 | End: 2021-04-06

## 2021-03-27 NOTE — TELEPHONE ENCOUNTER
Patient of Dr. Marion Kitchen calling for ear pain. Hx of otitis externa. Has been on drops, says it is getting worse. Says amox helps. Called in Augmentin. Looks like he has a hx of fungal otitis externa at some point as well. Tried to explain that Augmentin would not cover this, so he should follow up with Dr. Marion Kitchen next week. He said he would.

## 2021-04-13 ENCOUNTER — VIRTUAL VISIT (OUTPATIENT)
Dept: PULMONOLOGY | Age: 66
End: 2021-04-13
Payer: MEDICARE

## 2021-04-13 DIAGNOSIS — R53.83 OTHER FATIGUE: ICD-10-CM

## 2021-04-13 DIAGNOSIS — G47.33 SEVERE OBSTRUCTIVE SLEEP APNEA: Primary | ICD-10-CM

## 2021-04-13 DIAGNOSIS — Z71.89 ENCOUNTER FOR BIPAP USE COUNSELING: ICD-10-CM

## 2021-04-13 DIAGNOSIS — Z72.821 POOR SLEEP HYGIENE: ICD-10-CM

## 2021-04-13 DIAGNOSIS — E66.01 OBESITY, CLASS III, BMI 40-49.9 (MORBID OBESITY) (HCC): ICD-10-CM

## 2021-04-13 PROCEDURE — 99442 PR PHYS/QHP TELEPHONE EVALUATION 11-20 MIN: CPT | Performed by: NURSE PRACTITIONER

## 2021-04-13 ASSESSMENT — SLEEP AND FATIGUE QUESTIONNAIRES
HOW LIKELY ARE YOU TO NOD OFF OR FALL ASLEEP WHILE SITTING INACTIVE IN A PUBLIC PLACE: 0
HOW LIKELY ARE YOU TO NOD OFF OR FALL ASLEEP WHILE SITTING AND TALKING TO SOMEONE: 2
HOW LIKELY ARE YOU TO NOD OFF OR FALL ASLEEP WHILE WATCHING TV: 2
HOW LIKELY ARE YOU TO NOD OFF OR FALL ASLEEP WHEN YOU ARE A PASSENGER IN A CAR FOR AN HOUR WITHOUT A BREAK: 0
HOW LIKELY ARE YOU TO NOD OFF OR FALL ASLEEP IN A CAR, WHILE STOPPED FOR A FEW MINUTES IN TRAFFIC: 0
HOW LIKELY ARE YOU TO NOD OFF OR FALL ASLEEP WHILE SITTING QUIETLY AFTER LUNCH WITHOUT ALCOHOL: 3
HOW LIKELY ARE YOU TO NOD OFF OR FALL ASLEEP WHILE LYING DOWN TO REST IN THE AFTERNOON WHEN CIRCUMSTANCES PERMIT: 2

## 2021-04-13 NOTE — PATIENT INSTRUCTIONS

## 2021-04-13 NOTE — PROGRESS NOTES
Patient ID: Kaycee Umana is a 78 y.o. male who is being seen today for   Chief Complaint   Patient presents with    Sleep Apnea     31-90         HPI:     Kaycee Umana is a 78 y.o. male for telephone only virtual visit for HENNA follow up. He received new BIPAP after last visit. States he got new BIPAP. States he is doing well with it. Patient is using BiPAP   3-6 hrs/night. Using humidifier. No snoring on BiPAP. The pressure is well tolerated. The mask is comfortable- nasal pillows. No mask leak. Some daytime sleepiness. No driving. Some fatigue. Bedtimes and wakes times vary due to pain. Sleep onset is 5-10 minutes. Wakes up 3-4 times at night total.  STates has to sleep in increments due to pain. 3-4 nocturia. It takes few- unknown minutes to fall back a sleep. Multiple naps during the day. No headache specifically in am.  No weight gain. 0-1 caffienated beverages during the day. No alcohol. ESS is 11      Previous HPI 2/1/21  Kaycee Umana is a 78 y.o. male for telephone only virtual visit for HENNA follow up. States his BIPAP stopped working about 1 month ago (he does have a loaner currently from [employer]). States on/off button would not work. States he sent it away to be repaied. He states he was told the blower went out and was told it would be about $240 to fix it. States he is unable to pay that much money for the repair. He is requesting new BIPAP. He has had current BIPAP about 3.5 years. States he cannot sleep without BIPAP. He is using and benefiting from BIPAP. Patient is using BiPAP 4-6 hrs/night. Using humidifier. No snoring on BiPAP. The pressure is well tolerated. The mask is comfortable- nasal pillows. No mask leak. Some daytime sleepiness, medications, poor sleep hygiene. No driving. +fatigue. Bedtimes and wakes times vary due to pain. Sleep onset is 5-10 minutes. Wakes up 3-4 times at night total.  STates has to sleep in increments due to pain. 3-4 nocturia.  It takes few- unknown minutes to fall back a sleep. Multiple naps during the day. Rare headache in am. No weight gain. 1-2 caffienated beverages during the day. No alcohol. ESS is 10. Previous HPI 4/8/20  Lanora Brittle is a 78 y.o. male for telephone only virtual visit for HENNA follow up. States he is doing better with BIPAP. Patient is using BiPAP 3-5 hrs/night. Using humidifier. No snoring on BiPAP. The pressure is well tolerated. The mask is comfortable-nasal pillows. No mask leak. States he might want to change DME. Some daytime sleepiness- pain meds, chronic conditions contributing. No nodding off when driving. No dry nose or throat.  +fatigue. Bedtime is variable. States does not keep a set schedule. States pain pain can affect his sleep, can only sleep 3-4 hours at a time due to pain. Sleep onset is few minutes. Wakes up 3 times at night total. 3 nocturia. It takes few- unknown minutes to fall back a sleep. Few naps during the day. No headache in am. No weight gain. No caffienated beverages during the day. No alcohol. ESS is 9- discussed with patient. Previous HPI 9/3/20  Lanora Brittle is a 78 y.o. male in office for HENNA follow up. States he is doing okay with BIPAP. States he has to switch BIPAP from bed to living room due to sleep schedule is broken due to pain. States he is going to get an injection this week and hopes that will help the pain. States he cannot stay in bed for more than 3 hours due to pain, then goes to chair in living room (states chair will not fit in bedroom). Patient is using BiPAP 2-5 hrs/night. Using humidifier. No snoring on BiPAP. The pressure is well tolerated. The mask is comfortable-nasal pillows. No mask leak. Some daytime sleepiness. No driving. No dry nose or throat. Some fatigue. States sleep schedule varies, has to get up do to back pain. .  States he did get a new bed.   +naps during the day. No headache in am. No weight gain.  No caffienated beverages during the day. No alcohol. ESS is 10.         Sleep Medicine 4/13/2021 2/1/2021 4/8/2020 4/8/2020 9/3/2019 4/2/2019 7/11/2018   Sitting and reading 2 2 2 2 2 3 2   Watching TV 2 3 2 2 3 3 2   Sitting, inactive in a public place (e.g. a theatre or a meeting) 0 0 0 0 0 0 1   As a passenger in a car for an hour without a break 0 0 0 0 0 0 0   Lying down to rest in the afternoon when circumstances permit 2 3 3 3 3 3 3   Sitting and talking to someone 2 0 0 0 0 0 0   Sitting quietly after a lunch without alcohol 3 2 2 2 2 2 2   In a car, while stopped for a few minutes in traffic 0 0 0 2 0 0 0   Total score 11 10 9 11 10 11 10   Neck circumference - - - - 20.5 20.5 20.5       Past Medical History:  Past Medical History:   Diagnosis Date    Anemia     Angina     Arthritis     CAD (coronary artery disease)     [facility]     CKD (chronic kidney disease) stage 3, GFR 30-59 ml/min     Clostridium difficile diarrhea 3/16/12; 2/29/12    positive stool toxin    Diabetes mellitus ([address]     Diabetic neuropathy ([facility])     feet and legs    Disease of blood and blood forming organ     Dizziness     When he moves his head/ loses his balance    GERD (gastroesophageal reflux disease)     gastric ulcer    Headache     Hearing loss     Hyperlipidemia     Hypertension     Kidney stone 2002    Refusal of blood transfusions as patient is [employer]     Sleep apnea     Tinnitus     Venous ulcer ([address]     LLE    Wound, open 1/13/2012       Past Surgical History:        Procedure Laterality Date    CARDIAC SURGERY      Dec 27 2010, triple bypass    CHOLECYSTECTOMY  9/2011    HERNIA REPAIR  age2    OTHER SURGICAL HISTORY  11-16-11 REPAIR LOWER STERNAL INCISION, REMOVAL OF ONE STERNAL WIRE,    OTHER SURGICAL HISTORY  10/10/2014    phacoemulsification of cataract with intraocular lens implant left eye    UPPER GASTROINTESTINAL ENDOSCOPY         Allergies:  is allergic to amitriptyline; celecoxib; cymbalta [duloxetine hcl]; elavil [amitriptyline hcl]; gabapentin; and nsaids. Social History:    TOBACCO:   reports that he quit smoking about 33 years ago. He has a 16.00 pack-year smoking history. He has never used smokeless tobacco.  ETOH:   reports no history of alcohol use. Family History:       Problem Relation Age of Onset    Heart Disease Mother     Heart Disease Father     Cancer Father     Diabetes Brother     Diabetes Brother        Current Medications:    Current Outpatient Medications:     oxyCODONE-acetaminophen (PERCOCET) 5-325 MG per tablet, Take 1 tablet by mouth 4 times daily for 30 days. , Disp: 120 tablet, Rfl: 0    clotrimazole (LOTRIMIN) 1 % external solution, Apply 4 drops to the left ear twice daily (morning and evening) for 10 days, Disp: 29.57 mL, Rfl: 1    acetaminophen (TYLENOL) 500 MG tablet, Take 2 tablets by mouth 4 times daily as needed for Pain, Disp: 60 tablet, Rfl: 0    hydrocortisone (ANUSOL-HC) 2.5 % rectal cream, Place rectally 2 times daily. , Disp: 1 Tube, Rfl: 0    senna (SENOKOT) 8.6 MG TABS tablet, Take 1 tablet by mouth 2 times daily, Disp: 120 tablet, Rfl: 0    colchicine (COLCRYS) 0.6 MG tablet, Take 1 tablet by mouth daily, Disp: 30 tablet, Rfl: 3    docusate sodium (COLACE, DULCOLAX) 100 MG CAPS, Take 100 mg by mouth 2 times daily, Disp: , Rfl:     metolazone (ZAROXOLYN) 5 MG tablet, Take 1 tablet by mouth as needed (weight gain > 3#), Disp: 10 tablet, Rfl: 0    torsemide (DEMADEX) 100 MG tablet, Take 1 tablet by mouth 2 times daily, Disp: 30 tablet, Rfl: 3    insulin aspart (NOVOLOG) 100 UNIT/ML injection vial, Inject into the skin 3 times daily (before meals) 50 units with breakfast, 20 units with lunch and 60 units with dinner, Disp: , Rfl:     allopurinol (ZYLOPRIM) 100 MG tablet, Take 300 mg by mouth , Disp: , Rfl:     insulin glargine (LANTUS) 100 UNIT/ML injection vial, Inject 55 Units into the skin 2 times daily (Patient taking differently: Inject 80 Units into the skin 2 times daily ), Disp: 1 vial, Rfl: 1    Compression Stockings MISC, by Does not apply route 2 pair, knee high compression stockings, fitted/ zippered, Disp: 2 each, Rfl: 1    pregabalin (LYRICA) 50 MG capsule, Take 1 capsule by mouth Daily with supper, Disp: 60 capsule, Rfl: 3    silver sulfADIAZINE (SILVADENE) 1 % cream, Apply to BL lower leg ulcers daily, Disp: 20 g, Rfl: 0    carvedilol (COREG) 25 MG tablet, Take 1 tablet by mouth 2 times daily (with meals), Disp: 180 tablet, Rfl: 3    nitroGLYCERIN (NITROSTAT) 0.4 MG SL tablet, Place 1 tablet under the tongue every 5 minutes as needed, Disp: 25 tablet, Rfl: 1    polyethylene glycol (MIRALAX) packet, Take 17 g by mouth daily. , Disp: , Rfl:     fluticasone (FLONASE) 50 MCG/ACT nasal spray, 1 spray by Nasal route nightly as needed. , Disp: , Rfl:     aspirin 81 MG chewable tablet, Take 1 tablet by mouth daily. , Disp: 30 tablet, Rfl:     isosorbide mononitrate (IMDUR) 30 MG CR tablet, Take 1 tablet by mouth daily. , Disp: 30 tablet, Rfl: 0    ferrous sulfate 325 (65 FE) MG tablet, Take 325 mg by mouth 3 times daily (with meals). , Disp: , Rfl:     omeprazole (PRILOSEC) 20 MG capsule, Take 20 mg by mouth 2 times daily. , Disp: , Rfl:     [START ON 4/23/2021] oxyCODONE-acetaminophen (PERCOCET) 5-325 MG per tablet, Take 1 tablet by mouth 4 times daily for 30 days.  (Patient not taking: Reported on 4/13/2021), Disp: 120 tablet, Rfl: 0    naloxone 4 MG/0.1ML LIQD nasal spray, 1 spray by Nasal route as needed for Opioid Reversal (Patient not taking: Reported on 4/13/2021), Disp: 1 each, Rfl: 0    naloxone 4 MG/0.1ML LIQD nasal spray, 1 spray by Nasal route as needed for Opioid Reversal (Patient not taking: Reported on 4/13/2021), Disp: 1 each, Rfl: 0    potassium chloride (KLOR-CON M) 20 MEQ TBCR extended release tablet, Take 1 tablet by mouth 2 times daily (Patient not taking: Reported on 4/13/2021), Disp: 60 tablet, Rfl: 3    atorvastatin (LIPITOR) 40 MG tablet, Take 1 tablet by mouth daily (Patient not taking: Reported on 4/13/2021), Disp: 90 tablet, Rfl: 3      Objective:   PHYSICAL EXAM:    There were no vitals taken for this visit. DATA:   9/28 17 PSG AHI 35.4/REM AHI 45.7, PLMS 38, low SPO2 73%  11/16/17 titrationcontrolled sleep-related breathing with BiPAP, PLMS 86.8 recommendations BiPAP 14/8 cm H2O    BIPAP compliance data:  Compliance download report from 3/4/18 to 4/2/18 reviewed today by me and showed patient is using machine 3:23 hrs/night with 30% compliance and AHI 0.8 within this time frame. 9/30days with greater than 4 hours of machine use. 30/30days with any BIPAP use  BIPAP 14/8 cm H20    Compliance download report from 4/17/18 to 5/16/18 showed patient is using machine 5:27 hrs/night with 63.3% compliance and AHI 5.1 within this time frame. 19/30days with greater than 4 hours of machine use. BIPAP 14/8 cm H20    Compliance download report from 6/11/18 to 7/10/18 showed patient is using machine 6:42 hrs/night with 100% compliance and AHI 1.4 within this time frame. 30/30days with greater than 4 hours of machine use. BIPAP 14/8 cm H20    Compliance download report from 3/2/19 to 3/31/19 showed patient is using machine 14 min/night with 0% compliance and AHI 5.5 within this time frame. 0/30days with greater than 4 hours of machine use. BIPAP 14/8 cm H20    Compliance download report from 8/4/19 to 9/2/19 showed patient is using machine 2:47 hrs/night with 10% compliance and AHI 1.1 within this time frame. 3/30days with greater than 4 hours of machine use. 29/30 days with any BIPAP use. BIPAP 14/8 cm H20    Compliance download report from 3/4/20 to 4/3/20 reviewed today by me and showed patient is using machine 4:58 hrs/night with 74.2% compliance and AHI 1.2 within this time frame. 21/31 days with greater than 4 hours of machine use.   BIPAP 14/8 cm H20    2/1/2021unable to obtain BiPAP download report. Patient's current BiPAP is broken, he does have loaner from DME. New BIPAP:  Compliance download report from 3/6/21 to 4/4/21 reviewed today by me and showed patient is using machine 3:50 hrs/night with 46% compliance and AHI 1.2 within this time frame. 14/30days with greater than 4 hours of machine use. BIPAP 14/6 cm H20     Assessment:      Severe HENNA. BIPAP 14/8 cm H2O. nearing compliance. Patient uses BiPAP nightly however does not always get enough time each night with it  · Hypersomnia- sedating medications, poor sleep hygiene, co morbid conditions, likely contributing-improving. · Irregular sleep pattern due to pain  · Poor sleep hygiene  · Obesity   · Cardiomyopathy, CAD s/p CABG x3, pulmonary HTN, neuropathy, DM, HTN  · Chronic pain- followed by pain management. Plan:      -COntinueBiPAP 14/8 cm H2O  -Discussed severity of sleep apnea and importance of BiPAP use  airfit P10 large currently- can change masks if needed  -Supply order to DME of patient's choice  -Chin strap if not using full face mask  - Advised to use BiPAP 6-8 hrs at night and during naps-continue to increase use. - Replacement of mask, tubing, head straps every 3-6 months or sooner if damaged. - Patient instructed to contact DME company for any mask, tubing or machine trouble shooting if problems arise.  - Sleep hygiene  -Continue to work with pain management for chronic pain  -Cognitive behavioral therapy was discussed with patient including stimulus control and sleep restriction   -Recommend set bed/wake times, no naps during the daytime. Use BiPAP when sleeping.  - Avoid sedatives, alcohol and caffeinated drinks at bed time. - Patient counseled to never drive or operate heavy machinery while fatigue, drowsy or sleepy- patient verbalized understanding and agrees.    - Weight loss is recommended as a long-term intervention.    - Complications of HENNA if not treated were discussed with patient patient, including: systemic hypertension, pulmonary hypertension, cardiovascular morbidities, car accidents and all cause mortality.  -Patient education regarding sleep tips and PAP cleaning recommendations     Consent for telehealth visit was obtained and is noted in chart    Pod Koko Finn is a 78 y.o. male evaluated via telephone on 4/13/2021. Consent:  He and/or health care decision maker is aware that that he may receive a bill for this telephone service, depending on his insurance coverage, and has provided verbal consent to proceed: Yes          I affirm this is a Patient Initiated Episode with a Patient who has not had a related appointment within my department in the past 7 days or scheduled within the next 24 hours.     Patient identification was verified at the start of the visit: Yes    Total Time: minutes: 11-20 minutes    Note: not billable if this call serves to triage the patient into an appointment for the relevant concern      [address]

## 2021-04-20 ENCOUNTER — OFFICE VISIT (OUTPATIENT)
Dept: ENT CLINIC | Age: 66
End: 2021-04-20
Payer: MEDICARE

## 2021-04-20 DIAGNOSIS — H90.A32 MIXED CONDUCTIVE AND SENSORINEURAL HEARING LOSS OF LEFT EAR WITH RESTRICTED HEARING OF RIGHT EAR: ICD-10-CM

## 2021-04-20 DIAGNOSIS — H72.92 TYMPANIC MEMBRANE PERFORATION, LEFT: ICD-10-CM

## 2021-04-20 DIAGNOSIS — H66.015 RECURRENT ACUTE SUPPURATIVE OTITIS MEDIA WITH SPONTANEOUS RUPTURE OF LEFT TYMPANIC MEMBRANE: Primary | ICD-10-CM

## 2021-04-20 PROCEDURE — 4040F PNEUMOC VAC/ADMIN/RCVD: CPT | Performed by: OTOLARYNGOLOGY

## 2021-04-20 PROCEDURE — G8427 DOCREV CUR MEDS BY ELIG CLIN: HCPCS | Performed by: OTOLARYNGOLOGY

## 2021-04-20 PROCEDURE — 99213 OFFICE O/P EST LOW 20 MIN: CPT | Performed by: OTOLARYNGOLOGY

## 2021-04-20 PROCEDURE — 1123F ACP DISCUSS/DSCN MKR DOCD: CPT | Performed by: OTOLARYNGOLOGY

## 2021-04-20 PROCEDURE — G8417 CALC BMI ABV UP PARAM F/U: HCPCS | Performed by: OTOLARYNGOLOGY

## 2021-04-20 PROCEDURE — 1036F TOBACCO NON-USER: CPT | Performed by: OTOLARYNGOLOGY

## 2021-04-20 PROCEDURE — 92504 EAR MICROSCOPY EXAMINATION: CPT | Performed by: OTOLARYNGOLOGY

## 2021-04-20 PROCEDURE — 3017F COLORECTAL CA SCREEN DOC REV: CPT | Performed by: OTOLARYNGOLOGY

## 2021-04-20 RX ORDER — CIPROFLOXACIN HYDROCHLORIDE 3.5 MG/ML
4 SOLUTION/ DROPS TOPICAL 2 TIMES DAILY
Qty: 40 DROP | Refills: 3 | Status: SHIPPED | OUTPATIENT
Start: 2021-04-20 | End: 2021-04-25

## 2021-04-20 NOTE — PROGRESS NOTES
[address] [facility] [address] UP VISIT      Patient Name: Deep Kerr  Medical Record Number:  [id number]  Primary Care Physician:  Gwendolyn Shin MD    ChiefComplaint     Chief Complaint   Patient presents with    Ear Problem     Patient is here to follow up on his ears. He complains of left ear pain that started about 3 weeks ago       History of Present Illness     Shay Hanks is an 78 y.o. male previously seen for left sided headache and drainage with suspected acute otitis media by Dr. Garfield Soto. Interval History 10/5/2020: Since last visit, states significantly improved otalgia without otorrhea; denies headache. Continues to have left-sided aural fullness and left-sided hearing loss. No vertigo, fevers, chills, night sweats. Patient is a diabetic, last A1c 6.5 on 9/20/2019. Interval history 10/15/2020: Since last visit, patient states minimal otorrhea from the left ear and resolution of pruritus. Continues to state poor hearing in the left ear. Intermittent head congestion, has been taking Excedrin which improves this. Interval history 12/15/2020: No interval pruritus, pain, otorrhea. Continues to have poor hearing in the left ear-states he is left-handed, and has difficulty using the telephone in the left ear. Interval history 3/9/2021: Patient with left ear otorrhea/pain that has been ongoing for the past two weeks. Improvement with course of Augmentin, however continued mild pain and drainage. Interval history 4/20/2021: Left ear otorrhea two weeks ago, improved with amoxicillin. No recent drainage.      Past Medical History     Past Medical History:   Diagnosis Date    Anemia     Angina     Arthritis     CAD (coronary artery disease)     CHF (congestive heart failure) (HCC)     CKD (chronic kidney disease) stage 3, GFR 30-59 ml/min     Clostridium difficile diarrhea 3/16/12; 2/29/12    positive stool toxin    [facility]     Diabetic neuropathy ([facility]     feet and legs    Disease of blood and blood forming organ     Dizziness     When he moves his head/ loses his balance    GERD (gastroesophageal reflux disease)     gastric ulcer    Headache     Hearing loss     Hyperlipidemia     Hypertension     Kidney stone     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     Sleep apnea     Tinnitus     Venous ulcer ([facility]     LLE    Wound, open 2012       Past Surgical History     Past Surgical History:   Procedure Laterality Date    CARDIAC SURGERY      Dec 27 2010, triple bypass    CHOLECYSTECTOMY  2011    HERNIA REPAIR  age1    OTHER SURGICAL HISTORY  11 REPAIR LOWER STERNAL INCISION, REMOVAL OF ONE STERNAL WIRE,    OTHER SURGICAL HISTORY  10/10/2014    phacoemulsification of cataract with intraocular lens implant left eye    UPPER GASTROINTESTINAL ENDOSCOPY         Family History     Family History   Problem Relation Age of Onset    Heart Disease Mother     Heart Disease Father     Cancer Father     Diabetes Brother     Diabetes Brother        Social History     Social History     Tobacco Use    Smoking status: Former Smoker     Packs/day: 1.00     Years: 16.00     Pack years: 16.00     Quit date: 1/10/1988     Years since quittin.2    Smokeless tobacco: Never Used    Tobacco comment: 22 yrs ago   Substance Use Topics    Alcohol use: No     Alcohol/week: 0.0 standard drinks    Drug use: No        Allergies     Allergies   Allergen Reactions    Amitriptyline Other (See Comments)     tremor    Celecoxib      Hyperkalemia    Cymbalta [Duloxetine Hcl] Other (See Comments)     Tremors and slurred speech    Elavil [Amitriptyline Hcl]      tremor    Gabapentin      Tremor at high dose    Nsaids      Gastric ulcer       Medications     Current Outpatient Medications   Medication Sig Dispense Refill    ciprofloxacin (CILOXAN) 0.3 % ophthalmic solution Place 4 drops in ear(s) 2 times daily for 5 days 40 drop 3    oxyCODONE-acetaminophen (PERCOCET) 5-325 MG per tablet Take 1 tablet by mouth 4 times daily for 30 days. 120 tablet 0    clotrimazole (LOTRIMIN) 1 % external solution Apply 4 drops to the left ear twice daily (morning and evening) for 10 days 29.57 mL 1    acetaminophen (TYLENOL) 500 MG tablet Take 2 tablets by mouth 4 times daily as needed for Pain 60 tablet 0    hydrocortisone (ANUSOL-HC) 2.5 % rectal cream Place rectally 2 times daily.  1 Tube 0    senna (SENOKOT) 8.6 MG TABS tablet Take 1 tablet by mouth 2 times daily 120 tablet 0    colchicine (COLCRYS) 0.6 MG tablet Take 1 tablet by mouth daily 30 tablet 3    docusate sodium (COLACE, DULCOLAX) 100 MG CAPS Take 100 mg by mouth 2 times daily      metolazone (ZAROXOLYN) 5 MG tablet Take 1 tablet by mouth as needed (weight gain > 3#) 10 tablet 0    potassium chloride (KLOR-CON M) 20 MEQ TBCR extended release tablet Take 1 tablet by mouth 2 times daily 60 tablet 3    torsemide (DEMADEX) 100 MG tablet Take 1 tablet by mouth 2 times daily 30 tablet 3    insulin aspart (NOVOLOG) 100 UNIT/ML injection vial Inject into the skin 3 times daily (before meals) 50 units with breakfast, 20 units with lunch and 60 units with dinner      allopurinol (ZYLOPRIM) 100 MG tablet Take 300 mg by mouth       insulin glargine (LANTUS) 100 UNIT/ML injection vial Inject 55 Units into the skin 2 times daily (Patient taking differently: Inject 80 Units into the skin 2 times daily ) 1 vial 1    Compression Stockings MISC by Does not apply route 2 pair, knee high compression stockings, fitted/ zippered 2 each 1    pregabalin (LYRICA) 50 MG capsule Take 1 capsule by mouth Daily with supper 60 capsule 3    silver sulfADIAZINE (SILVADENE) 1 % cream Apply to BL lower leg ulcers daily 20 g 0    carvedilol (COREG) 25 MG tablet Take 1 tablet by mouth 2 times daily (with meals) 180 tablet 3    nitroGLYCERIN (NITROSTAT) 0.4 MG SL canal without stenosis, scant purulent otorrhea over the central tympanic membrane and left central tympanic membrane perforation noted  FACE: 1/6 House-Brackmann Scale, symmetric, sensation equal bilaterally  ORAL CAVITY: No masses or lesions palpated, uvula is midline, moist mucous membranes, tongue midline/mobile  NECK: Normal range of motion, no thyromegaly, trachea is midline, no lymphadenopathy, no neck masses, no crepitus  CHEST: Normal respiratory effort, no retractions, breathing comfortably  SKIN: No rashes, normal appearing skin, no evidence of skin lesions/tumors  NEURO: CN 2, 3, 4, 5, 6, 7, 11, 12 intact bilaterally       Procedure     Binocular Otomicroscopy     Pre op: Left otitis media  Post op: Left otitis media, tympanic membrane perforation  Procedure : Binocular otomicroscopy  Surgeon: RODRIGUEZ Bhakta  Estimated Blood Loss: None    After obtaining consent, the patient was placed in the examination chair in the reclined position.      -Left ear: External auditory canal with slight stenosis, purulent otorrhea noted, suctioned, tympanic membrane showing 40-50% central perforation of the anterior daksha-tympanum   * Patient tolerated the procedure well with no complications    Assessment and Plan     1. Mixed conductive and sensorineural hearing loss of left ear with restricted hearing of right ear  Patient with mixed hearing loss of left ear, with significant conductive gap-likely due to left tympanic membrane perforation but may also have an element of ossicular chain discontinuity/fixation. His tympanic membrane remains perforated, and we discussed tympanoplasty with possible ossicular chain reconstruction. The patient remains concerned about the ongoing pandemic, given his history of congestive heart failure. He would like to wait several months before proceeding with surgery, and we will see him back in 6 months for further evaluation    2.  Tympanic membrane perforation, left  Left tympanic membrane, 40-50%, central-no cholesteatoma visualized in the middle ear however difficult due to current otitis media    3. Recurrent acute otitis media  Patient with recurrent left acute otitis media with tympanic membrane perforation. CT imaging 09/2020 without erosive changes. He may benefit from middle ear exploration to rule out cholesteatoma, however he would like to defer any surgery at this time. We we will therefore prescribe Floxin drops to be used when he has purulent otorrhea. Return in about 6 months (around 10/20/2021). Kamar Benjamin MD  [address] [place] [address],[address]  Department of Otolaryngology/Head and Neck Surgery  4/20/21    I have performed a head and neck physical exam personally or was physically present during the key or critical portions of the service. Medical Decision Making: The following items were considered in medical decision making:  Independent review of images  Review / order clinical lab tests  Review / order radiology tests  Decision to obtain old records  Review and summation of old records as accessed through [facility] (a summary of my findings in these old records: None)     Portions of this note were dictated using Dragon.  There may be linguistic errors secondary to the use of this program.

## 2021-05-27 ENCOUNTER — VIRTUAL VISIT (OUTPATIENT)
Dept: PULMONOLOGY | Age: 66
End: 2021-05-27
Payer: MEDICARE

## 2021-05-27 DIAGNOSIS — Z72.821 POOR SLEEP HYGIENE: ICD-10-CM

## 2021-05-27 DIAGNOSIS — R53.83 OTHER FATIGUE: ICD-10-CM

## 2021-05-27 DIAGNOSIS — G47.33 SEVERE OBSTRUCTIVE SLEEP APNEA: Primary | ICD-10-CM

## 2021-05-27 DIAGNOSIS — Z71.89 ENCOUNTER FOR BIPAP USE COUNSELING: ICD-10-CM

## 2021-05-27 DIAGNOSIS — E66.01 OBESITY, CLASS III, BMI 40-49.9 (MORBID OBESITY) (HCC): ICD-10-CM

## 2021-05-27 PROCEDURE — 99443 PR PHYS/QHP TELEPHONE EVALUATION 21-30 MIN: CPT | Performed by: NURSE PRACTITIONER

## 2021-05-27 ASSESSMENT — SLEEP AND FATIGUE QUESTIONNAIRES
ESS TOTAL SCORE: 12
HOW LIKELY ARE YOU TO NOD OFF OR FALL ASLEEP WHILE LYING DOWN TO REST IN THE AFTERNOON WHEN CIRCUMSTANCES PERMIT: 3
HOW LIKELY ARE YOU TO NOD OFF OR FALL ASLEEP WHILE SITTING AND READING: 2
HOW LIKELY ARE YOU TO NOD OFF OR FALL ASLEEP IN A CAR, WHILE STOPPED FOR A FEW MINUTES IN TRAFFIC: 0

## 2021-05-27 NOTE — PROGRESS NOTES
Patient ID: Debra Joiner is a 78 y.o. male who is being seen today for   Chief Complaint   Patient presents with    Sleep Apnea     6 week          HPI:     Debra Joiner is a 78 y.o. male for televideo appointment via video and audio doxy. me virtual visit for HENNA follow up. States he is doing okay with BIPAP. States his circumstances are difficult with his medical conditions and he tries to use BIPAP as much as he can. States he goes from bedroom to chair to sleep due to pain. Patient is using BiPAP   2-5 hrs/night. Using humidifier. No snoring on BiPAP. The pressure is well tolerated. The mask is comfortable-nasal pillows. No mask leak. Some daytime sleepiness. No driving.  +fatigue. Bedtime and wakes times vary due to pain, cannot sleep more than a few hours at time due to the pain. STates is going to have MRI in a few weeks. Sleep onset is 5-10 minutes. Wakes up 3-4 times at night total.  STates has to sleep in increments due to pain. 3-4 nocturia. It takes few-unknown minutes to fall back a sleep. Multiple naps during the day. No headache in am. No weight gain. 0-1 caffienated beverages during the day. ESS is 12      Previous HPI 4/13/21  Debra Joiner is a 78 y.o. male for telephone only virtual visit for HENNA follow up. He received new BIPAP after last visit. States he got new BIPAP. States he is doing well with it. Patient is using BiPAP   3-6 hrs/night. Using humidifier. No snoring on BiPAP. The pressure is well tolerated. The mask is comfortable- nasal pillows. No mask leak. Some daytime sleepiness. No driving. Some fatigue. Bedtimes and wakes times vary due to pain. Sleep onset is 5-10 minutes. Wakes up 3-4 times at night total.  STates has to sleep in increments due to pain. 3-4 nocturia. It takes few- unknown minutes to fall back a sleep. Multiple naps during the day. No headache specifically in am.  No weight gain. 0-1 caffienated beverages during the day. No alcohol.  ESS is 11      Previous HPI 2/1/21  Eugenia Antoine is a 78 y.o. male for telephone only virtual visit for HENNA follow up. States his BIPAP stopped working about 1 month ago (he does have a loaner currently from [facility] States on/off button would not work. States he sent it away to be repaied. He states he was told the blower went out and was told it would be about $240 to fix it. States he is unable to pay that much money for the repair. He is requesting new BIPAP. He has had current BIPAP about 3.5 years. States he cannot sleep without BIPAP. He is using and benefiting from BIPAP. Patient is using BiPAP 4-6 hrs/night. Using humidifier. No snoring on BiPAP. The pressure is well tolerated. The mask is comfortable- nasal pillows. No mask leak. Some daytime sleepiness, medications, poor sleep hygiene. No driving. +fatigue. Bedtimes and wakes times vary due to pain. Sleep onset is 5-10 minutes. Wakes up 3-4 times at night total.  STates has to sleep in increments due to pain. 3-4 nocturia. It takes few- unknown minutes to fall back a sleep. Multiple naps during the day. Rare headache in am. No weight gain. 1-2 caffienated beverages during the day. No alcohol. ESS is 10. Previous HPI 4/8/20  Eugenia Antoine is a 78 y.o. male for telephone only virtual visit for HENNA follow up. States he is doing better with BIPAP. Patient is using BiPAP 3-5 hrs/night. Using humidifier. No snoring on BiPAP. The pressure is well tolerated. The mask is comfortable-nasal pillows. No mask leak. States he might want to change DME. Some daytime sleepiness- pain meds, chronic conditions contributing. No nodding off when driving. No dry nose or throat.  +fatigue. Bedtime is variable. States does not keep a set schedule. States pain pain can affect his sleep, can only sleep 3-4 hours at a time due to pain. Sleep onset is few minutes. Wakes up 3 times at night total. 3 nocturia. It takes few- unknown minutes to fall back a sleep. Few naps during the day. No headache in am. No weight gain. No caffienated beverages during the day. No alcohol. ESS is 9- discussed with patient. Previous HPI 9/3/20  Zoraida Gil is a 78 y.o. male in office for HENNA follow up. States he is doing okay with BIPAP. States he has to switch BIPAP from bed to living room due to sleep schedule is broken due to pain. States he is going to get an injection this week and hopes that will help the pain. States he cannot stay in bed for more than 3 hours due to pain, then goes to chair in living room (states chair will not fit in bedroom). Patient is using BiPAP 2-5 hrs/night. Using humidifier. No snoring on BiPAP. The pressure is well tolerated. The mask is comfortable-nasal pillows. No mask leak. Some daytime sleepiness. No driving. No dry nose or throat. Some fatigue. States sleep schedule varies, has to get up do to back pain. .  States he did get a new bed.   +naps during the day. No headache in am. No weight gain. No caffienated beverages during the day. No alcohol. ESS is 10.         Sleep Medicine 5/27/2021 4/13/2021 2/1/2021 4/8/2020 4/8/2020 9/3/2019 4/2/2019   Sitting and reading 2 2 2 2 2 2 3   Watching TV 3 2 3 2 2 3 3   Sitting, inactive in a public place (e.g. a theatre or a meeting) 0 0 0 0 0 0 0   As a passenger in a car for an hour without a break 1 0 0 0 0 0 0   Lying down to rest in the afternoon when circumstances permit 3 2 3 3 3 3 3   Sitting and talking to someone 0 2 0 0 0 0 0   Sitting quietly after a lunch without alcohol 3 3 2 2 2 2 2   In a car, while stopped for a few minutes in traffic 0 0 0 0 2 0 0   Total score 12 11 10 9 11 10 11   Neck circumference - - - - - 20.5 20.5       Past Medical History:  Past Medical History:   Diagnosis Date    Anemia     Angina     Arthritis     CAD (coronary artery disease)     CHF (congestive heart failure) (HCC)     CKD (chronic kidney disease) stage 3, GFR 30-59 ml/min     Clostridium difficile diarrhea 3/16/12; 2/29/12    positive stool toxin    Diabetes mellitus ([address]     Diabetic neuropathy ([facility] [address]     feet and legs    Disease of blood and blood forming organ     Dizziness     When he moves his head/ loses his balance    GERD (gastroesophageal reflux disease)     gastric ulcer    Headache     Hearing loss     Hyperlipidemia     Hypertension     Kidney stone 2002    Refusal of blood transfusions as patient is Orthodox     Sleep apnea     Tinnitus     Venous ulcer ([facility] [address]     LLE    Wound, open 1/13/2012       Past Surgical History:        Procedure Laterality Date    CARDIAC SURGERY      Dec 27 2010, triple bypass    CHOLECYSTECTOMY  9/2011    HERNIA REPAIR  age1    OTHER SURGICAL HISTORY  11-16-11 REPAIR LOWER STERNAL INCISION, REMOVAL OF ONE STERNAL WIRE,    OTHER SURGICAL HISTORY  10/10/2014    phacoemulsification of cataract with intraocular lens implant left eye    UPPER GASTROINTESTINAL ENDOSCOPY         Allergies:  is allergic to amitriptyline, celecoxib, cymbalta [duloxetine hcl], elavil [amitriptyline hcl], gabapentin, and nsaids. Social History:    TOBACCO:   reports that he quit smoking about 33 years ago. He has a 16.00 pack-year smoking history. He has never used smokeless tobacco.  ETOH:   reports no history of alcohol use. Family History:       Problem Relation Age of Onset    Heart Disease Mother     Heart Disease Father     Cancer Father     Diabetes Brother     Diabetes Brother        Current Medications:    Current Outpatient Medications:     linaCLOtide (LINZESS PO), Take by mouth, Disp: , Rfl:     oxyCODONE-acetaminophen (PERCOCET) 5-325 MG per tablet, Take 1 tablet by mouth 4 times daily for 30 days. , Disp: 120 tablet, Rfl: 0    [START ON 6/25/2021] oxyCODONE-acetaminophen (PERCOCET) 5-325 MG per tablet, Take 1 tablet by mouth 4 times daily for 30 days. , Disp: 120 tablet, Rfl: 0    clotrimazole (LOTRIMIN) 1 % external solution, Apply 4 drops to the left ear twice daily (morning and evening) for 10 days, Disp: 29.57 mL, Rfl: 1    acetaminophen (TYLENOL) 500 MG tablet, Take 2 tablets by mouth 4 times daily as needed for Pain, Disp: 60 tablet, Rfl: 0    hydrocortisone (ANUSOL-HC) 2.5 % rectal cream, Place rectally 2 times daily. , Disp: 1 Tube, Rfl: 0    senna (SENOKOT) 8.6 MG TABS tablet, Take 1 tablet by mouth 2 times daily, Disp: 120 tablet, Rfl: 0    colchicine (COLCRYS) 0.6 MG tablet, Take 1 tablet by mouth daily, Disp: 30 tablet, Rfl: 3    metolazone (ZAROXOLYN) 5 MG tablet, Take 1 tablet by mouth as needed (weight gain > 3#), Disp: 10 tablet, Rfl: 0    torsemide (DEMADEX) 100 MG tablet, Take 1 tablet by mouth 2 times daily, Disp: 30 tablet, Rfl: 3    insulin aspart (NOVOLOG) 100 UNIT/ML injection vial, Inject into the skin 3 times daily (before meals) 50 units with breakfast, 20 units with lunch and 60 units with dinner, Disp: , Rfl:     allopurinol (ZYLOPRIM) 100 MG tablet, Take 300 mg by mouth , Disp: , Rfl:     insulin glargine (LANTUS) 100 UNIT/ML injection vial, Inject 55 Units into the skin 2 times daily (Patient taking differently: Inject 80 Units into the skin 2 times daily ), Disp: 1 vial, Rfl: 1    Compression Stockings MISC, by Does not apply route 2 pair, knee high compression stockings, fitted/ zippered, Disp: 2 each, Rfl: 1    pregabalin (LYRICA) 50 MG capsule, Take 1 capsule by mouth Daily with supper, Disp: 60 capsule, Rfl: 3    silver sulfADIAZINE (SILVADENE) 1 % cream, Apply to BL lower leg ulcers daily, Disp: 20 g, Rfl: 0    carvedilol (COREG) 25 MG tablet, Take 1 tablet by mouth 2 times daily (with meals), Disp: 180 tablet, Rfl: 3    nitroGLYCERIN (NITROSTAT) 0.4 MG SL tablet, Place 1 tablet under the tongue every 5 minutes as needed, Disp: 25 tablet, Rfl: 1    polyethylene glycol (MIRALAX) packet, Take 17 g by mouth daily. , Disp: , Rfl:     fluticasone (FLONASE) 50 MCG/ACT nasal spray, 1 spray by Nasal route nightly as needed. , Disp: , Rfl:     aspirin 81 MG chewable tablet, Take 1 tablet by mouth daily. , Disp: 30 tablet, Rfl:     isosorbide mononitrate (IMDUR) 30 MG CR tablet, Take 1 tablet by mouth daily. , Disp: 30 tablet, Rfl: 0    ferrous sulfate 325 (65 FE) MG tablet, Take 325 mg by mouth 3 times daily (with meals). , Disp: , Rfl:     omeprazole (PRILOSEC) 20 MG capsule, Take 20 mg by mouth 2 times daily. , Disp: , Rfl:     docusate sodium (COLACE, DULCOLAX) 100 MG CAPS, Take 100 mg by mouth 2 times daily (Patient not taking: Reported on 5/27/2021), Disp: , Rfl:     potassium chloride (KLOR-CON M) 20 MEQ TBCR extended release tablet, Take 1 tablet by mouth 2 times daily (Patient not taking: Reported on 5/27/2021), Disp: 60 tablet, Rfl: 3      Objective:   PHYSICAL EXAM:    There were no vitals taken for this visit. DATA:   9/28 17 PSG AHI 35.4/REM AHI 45.7, PLMS 38, low SPO2 73%  11/16/17 titrationcontrolled sleep-related breathing with BiPAP, PLMS 86.8 recommendations BiPAP 14/8 cm H2O    BIPAP compliance data:  Compliance download report from 3/4/18 to 4/2/18 reviewed today by me and showed patient is using machine 3:23 hrs/night with 30% compliance and AHI 0.8 within this time frame. 9/30days with greater than 4 hours of machine use. 30/30days with any BIPAP use  BIPAP 14/8 cm H20    Compliance download report from 4/17/18 to 5/16/18 showed patient is using machine 5:27 hrs/night with 63.3% compliance and AHI 5.1 within this time frame. 19/30days with greater than 4 hours of machine use. BIPAP 14/8 cm H20    Compliance download report from 6/11/18 to 7/10/18 showed patient is using machine 6:42 hrs/night with 100% compliance and AHI 1.4 within this time frame. 30/30days with greater than 4 hours of machine use. BIPAP 14/8 cm H20    Compliance download report from 3/2/19 to 3/31/19 showed patient is using machine 14 min/night with 0% compliance and AHI 5.5 within this time frame.   0/30days with greater than 4 hours of machine use. BIPAP 14/8 cm H20    Compliance download report from 8/4/19 to 9/2/19 showed patient is using machine 2:47 hrs/night with 10% compliance and AHI 1.1 within this time frame. 3/30days with greater than 4 hours of machine use. 29/30 days with any BIPAP use. BIPAP 14/8 cm H20    Compliance download report from 3/4/20 to 4/3/20 reviewed today by me and showed patient is using machine 4:58 hrs/night with 74.2% compliance and AHI 1.2 within this time frame. 21/31 days with greater than 4 hours of machine use. BIPAP 14/8 cm H20    2/1/2021unable to obtain BiPAP download report. Patient's current BiPAP is broken, he does have loaner from DME. New BIPAP:  Compliance download report from 3/6/21 to 4/4/21 reviewed today by me and showed patient is using machine 3:50 hrs/night with 46% compliance and AHI 1.2 within this time frame. 14/30days with greater than 4 hours of machine use. BIPAP 14/6 cm H20     Compliance download report from 4/25/21 to 5/24/21 reviewed today by me and showed patient is using machine 3:01 hrs/night with 27% compliance and AHI 1.1 within this time frame. 8/30days with greater than 4 hours of machine use. 30/30 days with any BiPAP use  BIPAP 14/6 cm H20       Assessment:      Severe HENNA. BIPAP 14/6 cm H2O (should be 14/8). Patient uses BiPAP nightly however does not always get enough time each night with it. He is benefiting from BIPAP  · Hypersomnia- sedating medications, poor sleep hygiene, co morbid conditions, likely contributing-improving. · Irregular sleep pattern due to pain  · Poor sleep hygiene  · Obesity   · Cardiomyopathy, CAD s/p CABG x3, pulmonary HTN, neuropathy, DM, HTN  · Chronic pain- followed by pain management.     Plan:      -COntinueBiPAP 14/8 cm H2O- call DME to correct pressure  -Discussed severity of sleep apnea and importance of BiPAP use  airfit P10 large currently- can change masks if needed  -Supply order to DME of patient's choice  -Chin strap if not using full face mask  - Advised to use BiPAP 6-8 hrs at night and during naps-continue to increase use. - Replacement of mask, tubing, head straps every 3-6 months or sooner if damaged. - Patient instructed to contact [employer] company for any mask, tubing or machine trouble shooting if problems arise.  - Sleep hygiene  -Continue to work with pain management for chronic pain  -Cognitive behavioral therapy was discussed with patient including stimulus control and sleep restriction   -Recommend set bed/wake times, no naps during the daytime. Use BiPAP when sleeping.  - Avoid sedatives, alcohol and caffeinated drinks at bed time. - Patient counseled to never drive or operate heavy machinery while fatigue, drowsy or sleepy- patient verbalized understanding and agrees. - Weight loss is recommended as a long-term intervention.    - Complications of HENNA if not treated were discussed with patient patient, including: systemic hypertension, pulmonary hypertension, cardiovascular morbidities, car accidents and all cause mortality.  -Patient education regarding sleep tips and PAP cleaning recommendations     Consent for telehealth visit was obtained and is noted in chart    Pod Koko Finn is a 78 y.o. male evaluated via telephone on 5/27/2021. Consent:  He and/or health care decision maker is aware that that he may receive a bill for this telephone service, depending on his insurance coverage, and has provided verbal consent to proceed: Yes          I affirm this is a Patient Initiated Episode with a Patient who has not had a related appointment within my department in the past 7 days or scheduled within the next 24 hours.     Patient identification was verified at the start of the visit: Yes    Total Time: minutes: 21-30 minutes    Note: not billable if this call serves to triage the patient into an appointment for the relevant concern      Goldie Ganser, APRN - GILBERTO Frost STACY, APRN - CNP

## 2021-05-27 NOTE — PATIENT INSTRUCTIONS

## 2021-06-30 ENCOUNTER — HOSPITAL ENCOUNTER (OUTPATIENT)
Age: 66
Setting detail: SPECIMEN
Discharge: HOME | End: 2021-06-30
Payer: MEDICARE

## 2021-06-30 LAB
A/G RATIO: 1.6 (ref 1.1–2.2)
ALBUMIN SERPL-MCNC: 4.1 G/DL (ref 3.4–5)
ALP BLD-CCNC: 99 U/L (ref 40–129)
ALT SERPL-CCNC: 15 U/L (ref 10–40)
ANION GAP SERPL CALCULATED.3IONS-SCNC: 10 MMOL/L (ref 3–16)
AST SERPL-CCNC: 16 U/L (ref 15–37)
BASOPHILS ABSOLUTE: 0 K/UL (ref 0–0.2)
BASOPHILS RELATIVE PERCENT: 0.5 %
BILIRUB SERPL-MCNC: 0.4 MG/DL (ref 0–1)
BUN BLDV-MCNC: 59 MG/DL (ref 7–20)
CALCIUM SERPL-MCNC: 9.5 MG/DL (ref 8.3–10.6)
CHLORIDE BLD-SCNC: 99 MMOL/L (ref 99–110)
CO2: 32 MMOL/L (ref 21–32)
CREAT SERPL-MCNC: 1.6 MG/DL (ref 0.8–1.3)
EOSINOPHILS ABSOLUTE: 0.2 K/UL (ref 0–0.6)
EOSINOPHILS RELATIVE PERCENT: 3.1 %
GFR AFRICAN AMERICAN: 52
GFR NON-AFRICAN AMERICAN: 43
GLOBULIN: 2.5 G/DL
GLUCOSE BLD-MCNC: 131 MG/DL (ref 70–99)
HCT VFR BLD CALC: 35.6 % (ref 40.5–52.5)
HEMOGLOBIN: 11.6 G/DL (ref 13.5–17.5)
LYMPHOCYTES ABSOLUTE: 0.7 K/UL (ref 1–5.1)
LYMPHOCYTES RELATIVE PERCENT: 9.6 %
MCH RBC QN AUTO: 29.2 PG (ref 26–34)
MCHC RBC AUTO-ENTMCNC: 32.5 G/DL (ref 31–36)
MCV RBC AUTO: 89.9 FL (ref 80–100)
MONOCYTES ABSOLUTE: 0.4 K/UL (ref 0–1.3)
MONOCYTES RELATIVE PERCENT: 5.3 %
NEUTROPHILS ABSOLUTE: 6 K/UL (ref 1.7–7.7)
NEUTROPHILS RELATIVE PERCENT: 81.5 %
PDW BLD-RTO: 17.5 % (ref 12.4–15.4)
PLATELET # BLD: 141 K/UL (ref 135–450)
PMV BLD AUTO: 9.9 FL (ref 5–10.5)
POTASSIUM SERPL-SCNC: 4 MMOL/L (ref 3.5–5.1)
RBC # BLD: 3.96 M/UL (ref 4.2–5.9)
SODIUM BLD-SCNC: 141 MMOL/L (ref 136–145)
TOTAL PROTEIN: 6.6 G/DL (ref 6.4–8.2)
URIC ACID, SERUM: 6.5 MG/DL (ref 3.5–7.2)
WBC # BLD: 7.3 K/UL (ref 4–11)

## 2021-06-30 PROCEDURE — 84443 ASSAY THYROID STIM HORMONE: CPT

## 2021-06-30 PROCEDURE — 84439 ASSAY OF FREE THYROXINE: CPT

## 2021-06-30 PROCEDURE — 83036 HEMOGLOBIN GLYCOSYLATED A1C: CPT

## 2021-06-30 PROCEDURE — 84550 ASSAY OF BLOOD/URIC ACID: CPT

## 2021-06-30 PROCEDURE — 85025 COMPLETE CBC W/AUTO DIFF WBC: CPT

## 2021-06-30 PROCEDURE — 36415 COLL VENOUS BLD VENIPUNCTURE: CPT

## 2021-06-30 PROCEDURE — 80053 COMPREHEN METABOLIC PANEL: CPT

## 2021-06-30 PROCEDURE — 82746 ASSAY OF FOLIC ACID SERUM: CPT

## 2021-06-30 PROCEDURE — 80061 LIPID PANEL: CPT

## 2021-06-30 PROCEDURE — 82306 VITAMIN D 25 HYDROXY: CPT

## 2021-06-30 PROCEDURE — 82607 VITAMIN B-12: CPT

## 2021-07-01 LAB
CHOLESTEROL, TOTAL: 120 MG/DL (ref 0–199)
ESTIMATED AVERAGE GLUCOSE: 194.4 MG/DL
FOLATE: 5.31 NG/ML (ref 4.78–24.2)
HBA1C MFR BLD: 8.4 %
HDLC SERPL-MCNC: 35 MG/DL (ref 40–60)
LDL CHOLESTEROL CALCULATED: 61 MG/DL
T4 FREE: 1.2 NG/DL (ref 0.9–1.8)
TRIGL SERPL-MCNC: 118 MG/DL (ref 0–150)
TSH SERPL DL<=0.05 MIU/L-ACNC: 0.99 UIU/ML (ref 0.27–4.2)
VITAMIN B-12: 702 PG/ML (ref 211–911)
VITAMIN D 25-HYDROXY: 54.7 NG/ML
VLDLC SERPL CALC-MCNC: 24 MG/DL

## 2021-07-28 ENCOUNTER — TELEPHONE (OUTPATIENT)
Dept: ENT CLINIC | Age: 66
End: 2021-07-28

## 2021-08-12 ENCOUNTER — HOSPITAL ENCOUNTER (OUTPATIENT)
Age: 66
Setting detail: SPECIMEN
Discharge: HOME | End: 2021-08-12
Payer: MEDICARE

## 2021-08-12 LAB
A/G RATIO: 1.4 (ref 1.1–2.2)
ALBUMIN SERPL-MCNC: 4.3 G/DL (ref 3.4–5)
ALP BLD-CCNC: 95 U/L (ref 40–129)
ALT SERPL-CCNC: 13 U/L (ref 10–40)
ANION GAP SERPL CALCULATED.3IONS-SCNC: 13 MMOL/L (ref 3–16)
AST SERPL-CCNC: 14 U/L (ref 15–37)
BASOPHILS ABSOLUTE: 0.1 K/UL (ref 0–0.2)
BASOPHILS RELATIVE PERCENT: 0.7 %
BILIRUB SERPL-MCNC: 0.5 MG/DL (ref 0–1)
BUN BLDV-MCNC: 54 MG/DL (ref 7–20)
CALCIUM SERPL-MCNC: 9.9 MG/DL (ref 8.3–10.6)
CHLORIDE BLD-SCNC: 94 MMOL/L (ref 99–110)
CHOLESTEROL, TOTAL: 123 MG/DL (ref 0–199)
CO2: 32 MMOL/L (ref 21–32)
CREAT SERPL-MCNC: 1.4 MG/DL (ref 0.8–1.3)
EOSINOPHILS ABSOLUTE: 0.3 K/UL (ref 0–0.6)
EOSINOPHILS RELATIVE PERCENT: 3.4 %
FOLATE: 8.26 NG/ML (ref 4.78–24.2)
GFR AFRICAN AMERICAN: >60
GFR NON-AFRICAN AMERICAN: 50
GLOBULIN: 3 G/DL
GLUCOSE BLD-MCNC: 195 MG/DL (ref 70–99)
HCT VFR BLD CALC: 39.6 % (ref 40.5–52.5)
HDLC SERPL-MCNC: 31 MG/DL (ref 40–60)
HEMOGLOBIN: 12.8 G/DL (ref 13.5–17.5)
LDL CHOLESTEROL CALCULATED: 64 MG/DL
LYMPHOCYTES ABSOLUTE: 0.9 K/UL (ref 1–5.1)
LYMPHOCYTES RELATIVE PERCENT: 11.3 %
MCH RBC QN AUTO: 28.1 PG (ref 26–34)
MCHC RBC AUTO-ENTMCNC: 32.2 G/DL (ref 31–36)
MCV RBC AUTO: 87.3 FL (ref 80–100)
MONOCYTES ABSOLUTE: 0.5 K/UL (ref 0–1.3)
MONOCYTES RELATIVE PERCENT: 7 %
NEUTROPHILS ABSOLUTE: 6 K/UL (ref 1.7–7.7)
NEUTROPHILS RELATIVE PERCENT: 77.6 %
PDW BLD-RTO: 18.2 % (ref 12.4–15.4)
PLATELET # BLD: 162 K/UL (ref 135–450)
PMV BLD AUTO: 9.1 FL (ref 5–10.5)
POTASSIUM SERPL-SCNC: 4.2 MMOL/L (ref 3.5–5.1)
RBC # BLD: 4.54 M/UL (ref 4.2–5.9)
SODIUM BLD-SCNC: 139 MMOL/L (ref 136–145)
T4 FREE: 1.5 NG/DL (ref 0.9–1.8)
TOTAL PROTEIN: 7.3 G/DL (ref 6.4–8.2)
TRIGL SERPL-MCNC: 141 MG/DL (ref 0–150)
TSH SERPL DL<=0.05 MIU/L-ACNC: 1.51 UIU/ML (ref 0.27–4.2)
URIC ACID, SERUM: 6.5 MG/DL (ref 3.5–7.2)
VITAMIN B-12: 942 PG/ML (ref 211–911)
VITAMIN D 25-HYDROXY: 57 NG/ML
VLDLC SERPL CALC-MCNC: 28 MG/DL
WBC # BLD: 7.7 K/UL (ref 4–11)

## 2021-08-12 PROCEDURE — 84439 ASSAY OF FREE THYROXINE: CPT

## 2021-08-12 PROCEDURE — 82746 ASSAY OF FOLIC ACID SERUM: CPT

## 2021-08-12 PROCEDURE — 80061 LIPID PANEL: CPT

## 2021-08-12 PROCEDURE — 83036 HEMOGLOBIN GLYCOSYLATED A1C: CPT

## 2021-08-12 PROCEDURE — 84550 ASSAY OF BLOOD/URIC ACID: CPT

## 2021-08-12 PROCEDURE — 82306 VITAMIN D 25 HYDROXY: CPT

## 2021-08-12 PROCEDURE — 80053 COMPREHEN METABOLIC PANEL: CPT

## 2021-08-12 PROCEDURE — 85025 COMPLETE CBC W/AUTO DIFF WBC: CPT

## 2021-08-12 PROCEDURE — 36415 COLL VENOUS BLD VENIPUNCTURE: CPT

## 2021-08-12 PROCEDURE — 82607 VITAMIN B-12: CPT

## 2021-08-12 PROCEDURE — 84443 ASSAY THYROID STIM HORMONE: CPT

## 2021-08-13 LAB
ESTIMATED AVERAGE GLUCOSE: 197.3 MG/DL
HBA1C MFR BLD: 8.5 %

## 2021-08-20 ENCOUNTER — TELEPHONE (OUTPATIENT)
Dept: ENT CLINIC | Age: 66
End: 2021-08-20

## 2021-08-23 ENCOUNTER — APPOINTMENT (OUTPATIENT)
Dept: GENERAL RADIOLOGY | Age: 66
DRG: 291 | End: 2021-08-23
Payer: MEDICARE

## 2021-08-23 ENCOUNTER — HOSPITAL ENCOUNTER (INPATIENT)
Age: 66
LOS: 8 days | Discharge: HOME | DRG: 291 | End: 2021-08-31
Attending: EMERGENCY MEDICINE | Admitting: INTERNAL MEDICINE
Payer: MEDICARE

## 2021-08-23 DIAGNOSIS — I27.81 COR PULMONALE, CHRONIC (HCC): ICD-10-CM

## 2021-08-23 DIAGNOSIS — R77.8 ELEVATED TROPONIN: ICD-10-CM

## 2021-08-23 DIAGNOSIS — I50.9 ACUTE ON CHRONIC CONGESTIVE HEART FAILURE, UNSPECIFIED HEART FAILURE TYPE (HCC): ICD-10-CM

## 2021-08-23 DIAGNOSIS — I50.32 CHRONIC DIASTOLIC HEART FAILURE (HCC): ICD-10-CM

## 2021-08-23 DIAGNOSIS — R07.9 CHEST PAIN, UNSPECIFIED TYPE: Primary | ICD-10-CM

## 2021-08-23 DIAGNOSIS — I35.0 NONRHEUMATIC AORTIC VALVE STENOSIS: ICD-10-CM

## 2021-08-23 LAB
A/G RATIO: 1.1 (ref 1.1–2.2)
ALBUMIN SERPL-MCNC: 3.6 G/DL (ref 3.4–5)
ALP BLD-CCNC: 93 U/L (ref 40–129)
ALT SERPL-CCNC: 12 U/L (ref 10–40)
ANION GAP SERPL CALCULATED.3IONS-SCNC: 8 MMOL/L (ref 3–16)
AST SERPL-CCNC: 16 U/L (ref 15–37)
BASOPHILS ABSOLUTE: 0.1 K/UL (ref 0–0.2)
BASOPHILS RELATIVE PERCENT: 0.8 %
BILIRUB SERPL-MCNC: 0.4 MG/DL (ref 0–1)
BUN BLDV-MCNC: 61 MG/DL (ref 7–20)
CALCIUM SERPL-MCNC: 9.1 MG/DL (ref 8.3–10.6)
CHLORIDE BLD-SCNC: 98 MMOL/L (ref 99–110)
CO2: 32 MMOL/L (ref 21–32)
CREAT SERPL-MCNC: 1.5 MG/DL (ref 0.8–1.3)
EOSINOPHILS ABSOLUTE: 0.2 K/UL (ref 0–0.6)
EOSINOPHILS RELATIVE PERCENT: 2.4 %
GFR AFRICAN AMERICAN: 56
GFR NON-AFRICAN AMERICAN: 47
GLOBULIN: 3.3 G/DL
GLUCOSE BLD-MCNC: 207 MG/DL (ref 70–99)
HCT VFR BLD CALC: 35.8 % (ref 40.5–52.5)
HEMOGLOBIN: 11.9 G/DL (ref 13.5–17.5)
LYMPHOCYTES ABSOLUTE: 0.7 K/UL (ref 1–5.1)
LYMPHOCYTES RELATIVE PERCENT: 8.3 %
MCH RBC QN AUTO: 28.8 PG (ref 26–34)
MCHC RBC AUTO-ENTMCNC: 33.1 G/DL (ref 31–36)
MCV RBC AUTO: 86.8 FL (ref 80–100)
MONOCYTES ABSOLUTE: 0.5 K/UL (ref 0–1.3)
MONOCYTES RELATIVE PERCENT: 6.2 %
NEUTROPHILS ABSOLUTE: 6.9 K/UL (ref 1.7–7.7)
NEUTROPHILS RELATIVE PERCENT: 82.3 %
PDW BLD-RTO: 17.9 % (ref 12.4–15.4)
PLATELET # BLD: 154 K/UL (ref 135–450)
PMV BLD AUTO: 8.2 FL (ref 5–10.5)
POTASSIUM REFLEX MAGNESIUM: 4 MMOL/L (ref 3.5–5.1)
PRO-BNP: 1446 PG/ML (ref 0–124)
RBC # BLD: 4.13 M/UL (ref 4.2–5.9)
SARS-COV-2, NAAT: NOT DETECTED
SODIUM BLD-SCNC: 138 MMOL/L (ref 136–145)
TOTAL PROTEIN: 6.9 G/DL (ref 6.4–8.2)
TROPONIN: 0.03 NG/ML
TROPONIN: 0.04 NG/ML
WBC # BLD: 8.4 K/UL (ref 4–11)

## 2021-08-23 PROCEDURE — 84484 ASSAY OF TROPONIN QUANT: CPT

## 2021-08-23 PROCEDURE — 96375 TX/PRO/DX INJ NEW DRUG ADDON: CPT

## 2021-08-23 PROCEDURE — 85025 COMPLETE CBC W/AUTO DIFF WBC: CPT

## 2021-08-23 PROCEDURE — 6370000000 HC RX 637 (ALT 250 FOR IP): Performed by: PHYSICIAN ASSISTANT

## 2021-08-23 PROCEDURE — 2500000003 HC RX 250 WO HCPCS: Performed by: PHYSICIAN ASSISTANT

## 2021-08-23 PROCEDURE — 2700000000 HC OXYGEN THERAPY PER DAY

## 2021-08-23 PROCEDURE — 71045 X-RAY EXAM CHEST 1 VIEW: CPT

## 2021-08-23 PROCEDURE — 99284 EMERGENCY DEPT VISIT MOD MDM: CPT

## 2021-08-23 PROCEDURE — 93005 ELECTROCARDIOGRAM TRACING: CPT | Performed by: EMERGENCY MEDICINE

## 2021-08-23 PROCEDURE — 83880 ASSAY OF NATRIURETIC PEPTIDE: CPT

## 2021-08-23 PROCEDURE — 1200000000 HC SEMI PRIVATE

## 2021-08-23 PROCEDURE — 80053 COMPREHEN METABOLIC PANEL: CPT

## 2021-08-23 PROCEDURE — 96374 THER/PROPH/DIAG INJ IV PUSH: CPT

## 2021-08-23 PROCEDURE — 6360000002 HC RX W HCPCS: Performed by: PHYSICIAN ASSISTANT

## 2021-08-23 PROCEDURE — 87635 SARS-COV-2 COVID-19 AMP PRB: CPT

## 2021-08-23 RX ORDER — FUROSEMIDE 10 MG/ML
40 INJECTION INTRAMUSCULAR; INTRAVENOUS ONCE
Status: COMPLETED | OUTPATIENT
Start: 2021-08-23 | End: 2021-08-23

## 2021-08-23 RX ADMIN — FAMOTIDINE 20 MG: 10 INJECTION, SOLUTION INTRAVENOUS at 16:03

## 2021-08-23 RX ADMIN — MAGNESIUM HYDROXIDE/ALUMINUM HYDROXICE/SIMETHICONE: 120; 1200; 1200 SUSPENSION ORAL at 16:03

## 2021-08-23 RX ADMIN — FUROSEMIDE 40 MG: 10 INJECTION, SOLUTION INTRAMUSCULAR; INTRAVENOUS at 16:46

## 2021-08-23 ASSESSMENT — PAIN SCALES - GENERAL: PAINLEVEL_OUTOF10: 7

## 2021-08-23 ASSESSMENT — ENCOUNTER SYMPTOMS: TACHYPNEA: 1

## 2021-08-23 NOTE — H&P
Hospital Medicine History & Physical      PCP: Laith Patton MD    Date of Admission: 8/23/2021    Date of Service: Pt seen/examined on 8/23/2021 and Admitted to Inpatient with expected LOS greater than two midnights due to medical therapy. Chief Complaint: Chest pain      History Of Present Illness:    78 y.o. male who presented to [facility] with above complaints  Patient with PMH of CAD s/p CABG, diastolic CHF, CKD, HTN, DM 2, HLD, sleep apnea, pulmonary hypertension presented to the ED today with complaints of chest pain. Patient reports retrosternal chest pain, burning in nature, feels like GERD, took PPI without much relief. Pain started few hours prior to arrival.  Very minimal shortness of breath associated with this. Denies any nausea vomiting or diaphoresis. No cough. No subjective fevers chills or rigors. No abdominal pain. Patient reportedly took 4 baby aspirin prior to arrival.  Did not take any NTG. Patient wears 4 L oxygen at home at baseline. Denied any palpitations or dizziness. He is a prior smoker, but currently denies any active smoking. He reports he has chronic leg wounds that are wrapped in Ace wrap, recently got it checked on Friday. Some mild chronic leg swelling. Patient reports he sleeps in a recliner all the time. Patient reports he is weight has been increasing the past few weeks, he reports his nephrologist put him back on torsemide and metolazone which she has been taking. He reports he usually weighs 285 pounds, currently at 297 pounds.     Past Medical History:          Diagnosis Date    Anemia     Angina     Arthritis     CAD (coronary artery disease)     CHF (congestive heart failure) ([facility]     CKD (chronic kidney disease) stage 3, GFR 30-59 ml/min ([facility])     Clostridium difficile diarrhea 3/16/12; 2/29/12    positive stool toxin    Diabetes mellitus ([facility]     Diabetic neuropathy ([facility]     feet and legs    Disease of blood and blood forming organ  Dizziness     When he moves his head/ loses his balance    GERD (gastroesophageal reflux disease)     gastric ulcer    Headache     Hearing loss     Hyperlipidemia     Hypertension     Kidney stone 2002    Refusal of blood transfusions as patient is Christianity     Sleep apnea     Tinnitus     Venous ulcer ([address]     LLE    Wound, open 1/13/2012       Past Surgical History:          Procedure Laterality Date    CARDIAC SURGERY      Dec 27 2010, triple bypass    CHOLECYSTECTOMY  9/2011    HERNIA REPAIR  age1    OTHER SURGICAL HISTORY  11-16-11 REPAIR LOWER STERNAL INCISION, REMOVAL OF ONE STERNAL WIRE,    OTHER SURGICAL HISTORY  10/10/2014    phacoemulsification of cataract with intraocular lens implant left eye    UPPER GASTROINTESTINAL ENDOSCOPY         Medications Prior to Admission:      Prior to Admission medications    Medication Sig Start Date End Date Taking? Authorizing Provider   oxyCODONE-acetaminophen (PERCOCET) 5-325 MG per tablet Take 1 tablet by mouth 4 times daily for 30 days. 7/28/21 8/27/21  Dinesh Alvarez MD   oxyCODONE-acetaminophen (PERCOCET) 5-325 MG per tablet Take 1 tablet by mouth 4 times daily for 30 days. 8/26/21 9/25/21  Dinesh Alvarez MD   linaCLOtide (LINZESS PO) Take by mouth    Historical Provider, MD   clotrimazole (LOTRIMIN) 1 % external solution Apply 4 drops to the left ear twice daily (morning and evening) for 10 days 10/5/20   Blair Frankel MD   acetaminophen (TYLENOL) 500 MG tablet Take 2 tablets by mouth 4 times daily as needed for Pain 9/17/20   PATRICK Basilio CNP   hydrocortisone (ANUSOL-HC) 2.5 % rectal cream Place rectally 2 times daily.  8/7/19   PATRICK Parsons CNP   senna (SENOKOT) 8.6 MG TABS tablet Take 1 tablet by mouth 2 times daily 7/8/19   PATRICK Medeiros CNP   colchicine (COLCRYS) 0.6 MG tablet Take 1 tablet by mouth daily 5/2/18   PATRICK Louise - CNP   docusate sodium (COLACE, DULCOLAX) 100 MG CAPS Take 100 mg by mouth 2 times daily  Patient not taking: Reported on 5/27/2021 5/1/18   PATRICK Hyde CNP   metolazone (ZAROXOLYN) 5 MG tablet Take 1 tablet by mouth as needed (weight gain > 3#) 5/1/18   PATRICK Hyde CNP   potassium chloride (KLOR-CON M) 20 MEQ TBCR extended release tablet Take 1 tablet by mouth 2 times daily  Patient not taking: Reported on 5/27/2021 5/1/18   PATRICK Hyde CNP   torsemide (DEMADEX) 100 MG tablet Take 1 tablet by mouth 2 times daily 5/2/18   PATRICK Hyde CNP   insulin aspart (NOVOLOG) 100 UNIT/ML injection vial Inject into the skin 3 times daily (before meals) 50 units with breakfast, 20 units with lunch and 60 units with dinner    Historical Provider, MD   allopurinol (ZYLOPRIM) 100 MG tablet Take 300 mg by mouth     Historical Provider, MD   insulin glargine (LANTUS) 100 UNIT/ML injection vial Inject 55 Units into the skin 2 times daily  Patient taking differently: Inject 80 Units into the skin 2 times daily  1/15/17   Pura Krabbe, MD   Compression Stockings MISC by Does not apply route 2 pair, knee high compression stockings, fitted/ zippered 6/15/16   Nicki Merlin, APRN - CNP   pregabalin (LYRICA) 50 MG capsule Take 1 capsule by mouth Daily with supper 2/9/16   Mckayla Anaya MD   silver sulfADIAZINE (SILVADENE) 1 % cream Apply to BL lower leg ulcers daily 2/9/16   Mckayla Anaya MD   carvedilol (COREG) 25 MG tablet Take 1 tablet by mouth 2 times daily (with meals) 8/31/15   Nicki Merlin, APRN - CNP   nitroGLYCERIN (NITROSTAT) 0.4 MG SL tablet Place 1 tablet under the tongue every 5 minutes as needed 8/17/15   Nicki Merlin, APRN - CNP   polyethylene glycol (MIRALAX) packet Take 17 g by mouth daily. 9/2/14   Historical Provider, MD   fluticasone (FLONASE) 50 MCG/ACT nasal spray 1 spray by Nasal route nightly as needed. Historical Provider, MD   aspirin 81 MG chewable tablet Take 1 tablet by mouth daily. 1/11/13   Denilson Sampson MD   isosorbide mononitrate (IMDUR) 30 MG CR tablet Take 1 tablet by mouth daily. 1/11/13   Denilson Sampson MD   ferrous sulfate 325 (65 FE) MG tablet Take 325 mg by mouth 3 times daily (with meals). 12/22/10   Historical Provider, MD   omeprazole (PRILOSEC) 20 MG capsule Take 20 mg by mouth 2 times daily. 12/22/10   Historical Provider, MD       Allergies:  Amitriptyline, Celecoxib, Cymbalta [duloxetine hcl], Elavil [amitriptyline hcl], Gabapentin, and Nsaids    Social History:      The patient currently lives at home    TOBACCO:   reports that he quit smoking about 33 years ago. He has a 16.00 pack-year smoking history. He has never used smokeless tobacco.  ETOH:   reports no history of alcohol use. E-Cigarettes/Vaping Use     Questions Responses    E-Cigarette/Vaping Use Never User    Start Date     Passive Exposure     Quit Date     Counseling Given     Comments             Family History:    Positive as follows:        Problem Relation Age of Onset    Heart Disease Mother     Heart Disease Father     Cancer Father     Diabetes Brother     Diabetes Brother        REVIEW OF SYSTEMS COMPLETED:   Pertinent positives as noted in the HPI. All other systems reviewed and negative. PHYSICAL EXAM PERFORMED:    /69   Pulse 82   Resp 19   Ht 5' 9\" (1.753 m)   Wt 295 lb (133.8 kg)   SpO2 98%   BMI 43.56 kg/m²     General appearance:  No apparent distress, appears stated age and cooperative. HEENT:  Normal cephalic, atraumatic without obvious deformity. Pupils equal, round, and reactive to light. Extra ocular muscles intact. Conjunctivae/corneas clear. Neck: Supple, with full range of motion. No jugular venous distention. Trachea midline. Respiratory:  Normal respiratory effort. Clear to auscultation, bilaterally without Rales/Wheezes/Rhonchi. Cardiovascular:  Regular rate and rhythm with normal S1/S2 without murmurs, rubs or gallops.   Abdomen: Soft, non-tender, non-distended with normal bowel sounds. Musculoskeletal:  No clubbing, cyanosis or edema bilaterally. Full range of motion without deformity. Skin: Skin color, texture, turgor normal.    Bilateral leg Ace wrap for chronic venous stasis ulcers  Neurologic:  Neurovascularly intact without any focal sensory/motor deficits. Cranial nerves: II-XII intact, grossly non-focal.  Psychiatric:  Alert and oriented, thought content appropriate, normal insight  Capillary Refill: Brisk,3 seconds, normal  Peripheral Pulses: +2 palpable, equal bilaterally       Labs:     Recent Labs     08/23/21  1422   WBC 8.4   HGB 11.9*   HCT 35.8*        Recent Labs     08/23/21  1422      K 4.0   CL 98*   CO2 32   BUN 61*   CREATININE 1.5*   CALCIUM 9.1     Recent Labs     08/23/21  1422   AST 16   ALT 12   BILITOT 0.4   ALKPHOS 93     No results for input(s): INR in the last 72 hours. Recent Labs     08/23/21  1422 08/23/21  1802   TROPONINI 0.04* 0.03*       Urinalysis:      Lab Results   Component Value Date    NITRU Negative 07/08/2019    WBCUA 0-2 11/05/2013    BACTERIA Rare 11/05/2013    RBCUA 0-2 11/05/2013    BLOODU Negative 07/08/2019    SPECGRAV 1.010 07/08/2019    GLUCOSEU Negative 07/08/2019    GLUCOSEU NEGATIVE 02/29/2012       Radiology:     CXR: I have reviewed the CXR with the following interpretation: Pulmonary edema, cardiomegaly  EKG:  I have reviewed the EKG with the following interpretation: Accelerated junctional rhythm, no acute ischemic changes, junctional rhythm is new compared to prior EKG from 2018    XR CHEST PORTABLE   Final Result   Pulmonary vascular congestion/interstitial edema. Stable cardiomegaly.              ASSESSMENT:PLAN:    Acute diastolic congestive heart failure  BNP 1446, CXR showing pulmonary edema, vascular congestion  Echo 4/2018 showed LVEF 55 to 60%, grade 2 DD  IV Lasix 40 mg twice daily initiated  Strict I's/O and daily weights  Cardiology consulted  2D echo ordered    Chest pain w/ elevated troponin  CAD s/p CABG x3  Troponin 0.04 --> 0.03. Likely due to CHF. Follow troponin trend  Last cath in 2012 showed widely patent grafts  Cardiology consulted    Severe HENNA-resume home BiPAP    DM type II-controlled, A1c 8.5  resume home insulin regimen, added SSI with Accu-Cheks, hypoglycemia protocol    Hyperlipidemia-statin resumed    Morbid obesity BMI 82.40  Complicating assessment and treatment, placing patient at high risk for multiple co-morbidities as well as early death and contributing to the patient's presentation. Counseled on weight loss. Hypertension-controlled, resume home medication regimen    CKD stage III-stable, monitor. Followed by Dr. Karey Cervantes outpatient    DVT Prophylaxis: Lovenox  Diet: No diet orders on file  Code Status: Full code  PT/OT Eval Status: Ambulatory    Dispo -IP stay       Alejandro Santos MD    Thank you Tona Alicea MD for the opportunity to be involved in this patient's care. If you have any questions or concerns please feel free to contact me at [phone].

## 2021-08-23 NOTE — ED PROVIDER NOTES
 Sleep apnea     Tinnitus     Venous ulcer (HCC)     LLE    Wound, open 2012     Past Surgical History:   Procedure Laterality Date    CARDIAC SURGERY      Dec 27 2010, triple bypass    CHOLECYSTECTOMY  2011    HERNIA REPAIR  age1    OTHER SURGICAL HISTORY  11 REPAIR LOWER STERNAL INCISION, REMOVAL OF ONE STERNAL WIRE,    OTHER SURGICAL HISTORY  10/10/2014    phacoemulsification of cataract with intraocular lens implant left eye    UPPER GASTROINTESTINAL ENDOSCOPY       Family History   Problem Relation Age of Onset    Heart Disease Mother     Heart Disease Father     Cancer Father     Diabetes Brother     Diabetes Brother      Social History     Socioeconomic History    Marital status: Single     Spouse name: Not on file    Number of children: Not on file    Years of education: Not on file    Highest education level: Not on file   Occupational History    Not on file   Tobacco Use    Smoking status: Former Smoker     Packs/day: 1.00     Years: 16.00     Pack years: 16.00     Quit date: 1/10/1988     Years since quittin.6    Smokeless tobacco: Never Used    Tobacco comment: 22 yrs ago   Vaping Use    Vaping Use: Never used   Substance and Sexual Activity    Alcohol use: No     Alcohol/week: 0.0 standard drinks    Drug use: No    Sexual activity: Not on file   Other Topics Concern    Not on file   Social History Narrative    Not on file     Social Determinants of Health     Financial Resource Strain:     Difficulty of Paying Living Expenses:    Food Insecurity:     Worried About Running Out of Food in the Last Year:     Ran Out of Food in the Last Year:    Transportation Needs:     Lack of Transportation (Medical):      Lack of Transportation (Non-Medical):    Physical Activity:     Days of Exercise per Week:     Minutes of Exercise per Session:    Stress:     Feeling of Stress :    Social Connections:     Frequency of Communication with Friends and Family:     Frequency of Social Gatherings with Friends and Family:     Attends Zoroastrianism Services:     Active Member of Clubs or Organizations:     Attends Club or Organization Meetings:     Marital Status:    Intimate Partner Violence:     Fear of Current or Ex-Partner:     Emotionally Abused:     Physically Abused:     Sexually Abused:      Current Facility-Administered Medications   Medication Dose Route Frequency Provider Last Rate Last Admin    famotidine (PEPCID) injection 20 mg  20 mg Intravenous Once Martine Hopkins, [address]        aluminum & magnesium hydroxide-simethicone (MAALOX) 30 mL, lidocaine viscous hcl (XYLOCAINE) 5 mL (GI COCKTAIL)   Oral Once EYAD Zayas         Current Outpatient Medications   Medication Sig Dispense Refill    oxyCODONE-acetaminophen (PERCOCET) 5-325 MG per tablet Take 1 tablet by mouth 4 times daily for 30 days. 120 tablet 0    [START ON 8/26/2021] oxyCODONE-acetaminophen (PERCOCET) 5-325 MG per tablet Take 1 tablet by mouth 4 times daily for 30 days. 120 tablet 0    linaCLOtide (LINZESS PO) Take by mouth      clotrimazole (LOTRIMIN) 1 % external solution Apply 4 drops to the left ear twice daily (morning and evening) for 10 days 29.57 mL 1    acetaminophen (TYLENOL) 500 MG tablet Take 2 tablets by mouth 4 times daily as needed for Pain 60 tablet 0    hydrocortisone (ANUSOL-HC) 2.5 % rectal cream Place rectally 2 times daily.  1 Tube 0    senna (SENOKOT) 8.6 MG TABS tablet Take 1 tablet by mouth 2 times daily 120 tablet 0    colchicine (COLCRYS) 0.6 MG tablet Take 1 tablet by mouth daily 30 tablet 3    docusate sodium (COLACE, DULCOLAX) 100 MG CAPS Take 100 mg by mouth 2 times daily (Patient not taking: Reported on 5/27/2021)      metolazone (ZAROXOLYN) 5 MG tablet Take 1 tablet by mouth as needed (weight gain > 3#) 10 tablet 0    potassium chloride (KLOR-CON M) 20 MEQ TBCR extended release tablet Take 1 tablet by mouth 2 times daily (Patient not taking: Reported on 5/27/2021) 60 tablet 3    torsemide (DEMADEX) 100 MG tablet Take 1 tablet by mouth 2 times daily 30 tablet 3    insulin aspart (NOVOLOG) 100 UNIT/ML injection vial Inject into the skin 3 times daily (before meals) 50 units with breakfast, 20 units with lunch and 60 units with dinner      allopurinol (ZYLOPRIM) 100 MG tablet Take 300 mg by mouth       insulin glargine (LANTUS) 100 UNIT/ML injection vial Inject 55 Units into the skin 2 times daily (Patient taking differently: Inject 80 Units into the skin 2 times daily ) 1 vial 1    Compression Stockings MISC by Does not apply route 2 pair, knee high compression stockings, fitted/ zippered 2 each 1    pregabalin (LYRICA) 50 MG capsule Take 1 capsule by mouth Daily with supper 60 capsule 3    silver sulfADIAZINE (SILVADENE) 1 % cream Apply to BL lower leg ulcers daily 20 g 0    carvedilol (COREG) 25 MG tablet Take 1 tablet by mouth 2 times daily (with meals) 180 tablet 3    nitroGLYCERIN (NITROSTAT) 0.4 MG SL tablet Place 1 tablet under the tongue every 5 minutes as needed 25 tablet 1    polyethylene glycol (MIRALAX) packet Take 17 g by mouth daily.  fluticasone (FLONASE) 50 MCG/ACT nasal spray 1 spray by Nasal route nightly as needed.  aspirin 81 MG chewable tablet Take 1 tablet by mouth daily. 30 tablet     isosorbide mononitrate (IMDUR) 30 MG CR tablet Take 1 tablet by mouth daily. 30 tablet 0    ferrous sulfate 325 (65 FE) MG tablet Take 325 mg by mouth 3 times daily (with meals).  omeprazole (PRILOSEC) 20 MG capsule Take 20 mg by mouth 2 times daily.        Allergies   Allergen Reactions    Amitriptyline Other (See Comments)     tremor    Celecoxib      Hyperkalemia    Cymbalta [Duloxetine Hcl] Other (See Comments)     Tremors and slurred speech    Elavil [Amitriptyline Hcl]      tremor    Gabapentin      Tremor at high dose    Nsaids      Gastric ulcer       REVIEW OF SYSTEMS  10 systems reviewed, pertinent positives per HPI otherwise noted to be negative    PHYSICAL EXAM  /63   Pulse 79   Resp 16   Ht 5' 9\" (1.753 m)   Wt 295 lb (133.8 kg)   SpO2 93%   BMI 43.56 kg/m²   GENERAL APPEARANCE: Awake and alert. Cooperative. No acute distress. HEAD: Normocephalic. Atraumatic. EYES: PERRL. EOM's grossly intact. ENT: Mucous membranes are moist.   NECK: Supple. No JVD. No tracheal tenderness or deviation. No crepitus. HEART: RRR. No murmurs. No chest wall tenderness. LUNGS: Respirations unlabored. CTAB. Good air exchange. Speaking comfortably in full sentences. No wheezing, rhonchi, rales. ABDOMEN: Soft. Non-distended. Non-tender. No guarding or rebound. No midline pulsatile mass. EXTREMITIES: No peripheral edema. Legs bandage from venous stasis ulcers. States that wounds were checked Friday and he reports that they are improving. He is requesting that I do not remove the bandages. No pitting edema present. Pedal pulses palpable and cap refill less than 5 seconds. Moves all extremities equally. All extremities neurovascularly intact. SKIN: Warm and dry. No acute rashes. NEUROLOGICAL: Alert and oriented. CN's 2-12 intact. No gross facial drooping. Strength 5/5, sensation intact. PSYCHIATRIC: Normal mood and affect. RADIOLOGY  XR CHEST PORTABLE    Result Date: 8/23/2021  EXAMINATION: ONE XRAY VIEW OF THE CHEST 8/23/2021 2:29 pm COMPARISON: Chest x-ray date 04/24/2018. HISTORY: ORDERING SYSTEM PROVIDED HISTORY: chest pain TECHNOLOGIST PROVIDED HISTORY: Reason for exam:->chest pain Reason for Exam: cp, acid reflux problems Acuity: Acute Type of Exam: Initial FINDINGS: HEART/MEDIASTINUM: The cardiac silhouette is enlarged, but stable. Median sternotomy wires are noted. PLEURA/LUNGS: There is pulmonary vascular congestion/interstitial edema. There are no focal consolidations or pleural effusions. There is no appreciable pneumothorax. BONES/SOFT TISSUE: No acute abnormality.      Pulmonary vascular congestion/interstitial edema. Stable cardiomegaly. ED COURSE  Patient received GI cocktail and Pepcid for pain, with good relief. Triage vitals stable. CBC without leukocytosis or anemia. CMP showing baseline kidney dysfunction which appears unchanged. Troponin elevated at 0.04. Patient received aspirin and nitro in route. Chest x-ray showing pulmonary vascular congestion versus interstitial edema with stable cardiomegaly. EKG was a sinus rhythm without evidence for acute ischemia. Rapid Covid was negative. Patient given 40 mEq of Lasix IV as suspecting that symptomology is secondary to CHF exacerbation. Repeat troponin pending. Discussed recommendations for admission with hospital medicine and orders placed. A discussion was had with Mr. Rosette Nolen regarding chest discomfort and shortness of breath, ED findings and recommendations for admission. Risk management discussed and shared decision making had with patient and/or surrogate. All questions were answered. Patient in agreement.     MDM  Results for orders placed or performed during the hospital encounter of 08/23/21   COVID-19, Rapid    Specimen: Nasopharyngeal Swab   Result Value Ref Range    SARS-CoV-2, NAAT Not Detected Not Detected   CBC auto differential   Result Value Ref Range    WBC 8.4 4.0 - 11.0 K/uL    RBC 4.13 (L) 4.20 - 5.90 M/uL    Hemoglobin 11.9 (L) 13.5 - 17.5 g/dL    Hematocrit 35.8 (L) 40.5 - 52.5 %    MCV 86.8 80.0 - 100.0 fL    MCH 28.8 26.0 - 34.0 pg    MCHC 33.1 31.0 - 36.0 g/dL    RDW 17.9 (H) 12.4 - 15.4 %    Platelets 784 874 - 105 K/uL    MPV 8.2 5.0 - 10.5 fL    Neutrophils % 82.3 %    Lymphocytes % 8.3 %    Monocytes % 6.2 %    Eosinophils % 2.4 %    Basophils % 0.8 %    Neutrophils Absolute 6.9 1.7 - 7.7 K/uL    Lymphocytes Absolute 0.7 (L) 1.0 - 5.1 K/uL    Monocytes Absolute 0.5 0.0 - 1.3 K/uL    Eosinophils Absolute 0.2 0.0 - 0.6 K/uL    Basophils Absolute 0.1 0.0 - 0.2 K/uL   Comprehensive Metabolic Panel w/ Reflex to MG   Result Value Ref Range    Sodium 138 136 - 145 mmol/L    Potassium reflex Magnesium 4.0 3.5 - 5.1 mmol/L    Chloride 98 (L) 99 - 110 mmol/L    CO2 32 21 - 32 mmol/L    Anion Gap 8 3 - 16    Glucose 207 (H) 70 - 99 mg/dL    BUN 61 (H) 7 - 20 mg/dL    CREATININE 1.5 (H) 0.8 - 1.3 mg/dL    GFR Non- 47 (A) >60    GFR  56 (A) >60    Calcium 9.1 8.3 - 10.6 mg/dL    Total Protein 6.9 6.4 - 8.2 g/dL    Albumin 3.6 3.4 - 5.0 g/dL    Albumin/Globulin Ratio 1.1 1.1 - 2.2    Total Bilirubin 0.4 0.0 - 1.0 mg/dL    Alkaline Phosphatase 93 40 - 129 U/L    ALT 12 10 - 40 U/L    AST 16 15 - 37 U/L    Globulin 3.3 g/dL   Troponin   Result Value Ref Range    Troponin 0.04 (H) <0.01 ng/mL   Brain Natriuretic Peptide   Result Value Ref Range    Pro-BNP 1,446 (H) 0 - 124 pg/mL   EKG 12 Lead   Result Value Ref Range    Ventricular Rate 86 BPM    Atrial Rate 99 BPM    QRS Duration 82 ms    Q-T Interval 348 ms    QTc Calculation (Bazett) 416 ms    R Axis -71 degrees    T Axis 132 degrees    Diagnosis       Sinus rhythm with A-V dissociation and Accelerated Junctional rhythmLeft axis deviationPulmonary disease patternSeptal infarct , age undeterminedST & T wave abnormality, consider lateral ischemiaAbnormal ECGWhen compared with ECG of 24-APR-2018 13:09,Junctional rhythm has replaced Sinus rhythmQRS axis Shifted leftSeptal infarct is now Present     I spoke with Darryl Sanchez and Perez . We thoroughly discussed the history, physical exam, laboratory and imaging studies, as well as, emergency department course. Based upon that discussion, we've decided to admit Shaan Rubio to the hospital for further observation, evaluation and treatment. Final Impression  1. Chest pain, unspecified type    2. Acute on chronic congestive heart failure, unspecified heart failure type (HCC)    3. Elevated troponin      Blood pressure 123/68, pulse 76, resp.  rate 18, height 5' 9\" (1.753 m), weight 295 lb (133.8 kg), SpO2 98 %. DISPOSITION  Patient was admitted to the hospital in stable condition.           Chely Perdomo, [id number]  08/23/21 [id number]

## 2021-08-23 NOTE — ED PROVIDER NOTES
I independently performed a history and physical on [facility]. All diagnostic, treatment, and disposition decisions were made by myself in conjunction with the advanced practice provider. I have participated in the medical decision making and directed the treatment plan and disposition of the patient. For further details of Peter Guerra emergency department encounter, please see the advanced practice provider's documentation. CHIEF COMPLAINT  Chief Complaint   Patient presents with    Chest Pain     pt to ED via EMS from home with chest pain. hx of CHF. denies SOB. took 4 baby aspirin PTA and denied nitro. wears 4 L O2 at home at baseline       Briefly, [facility] is a 78 y.o. male  who presents to the ED complaining of chest pain and shortness of breath    FOCUSED PHYSICAL EXAMINATION  /68   Pulse 76   Resp 18   Ht 5' 9\" (1.753 m)   Wt 295 lb (133.8 kg)   SpO2 98%   BMI 43.56 kg/m²      Focused physical examination:  General appearance:  Cooperative. No acute distress. Skin:  Warm. Dry. Eye:  Extraocular movements intact. Ears, nose, mouth and throat:  Oral mucosa moist,  Neck:  Trachea midline. Heart:  Regular rate and rhythm  Perfusion:  intact  Respiratory:  Lungs clear to auscultation bilaterally. Respirations nonlabored. Abdominal:   Non distended. Nontender  Neurological:  Alert and oriented x 3. Moves all extremities spontaneously  Musculoskeletal:   Normal ROM, no deformities bilateral lower extremity pitting edema with legs and wraps          Psychiatric:  Normal mood      EKG: EKG: Shows likely sinus rhythm however there is some A-V dissociation at a rate of 86 bpm.  No missed beats but variable IN intervals. Left axis deviation with pulmonary disease pattern and septal Q waves. Nonspecific biphasic T waves laterally with flipped T waves in 1 and aVL. Prior EKG from 1/10/2017 is difficult to interpret due to very poor underlying baseline.   No ST elevation today    MDM: Patient presents emergency department today with some noted chest pain shortness of breath. Worsening peripheral edema worsening shortness of breath. BNP, troponin is up. Chest x-ray concerning for fluid overload. Suspect CHF. Given Lasix and already received aspirin and do plan to admit to the hospital.  No chest pain at present. Resting comfortably    During the patient's ED course, the patient was given:  Medications   famotidine (PEPCID) injection 20 mg (20 mg Intravenous Given 8/23/21 1603)   aluminum & magnesium hydroxide-simethicone (MAALOX) 30 mL, lidocaine viscous hcl (XYLOCAINE) 5 mL (GI COCKTAIL) ( Oral Given 8/23/21 1603)   furosemide (LASIX) injection 40 mg (40 mg Intravenous Given 8/23/21 1646)        CLINICAL IMPRESSION  1. Chest pain, unspecified type    2. Acute on chronic congestive heart failure, unspecified heart failure type (HCC)    3. Elevated troponin        DISPOSITION  Admission      This chart was created using Dragon dictation software. Efforts were made by me to ensure accuracy, however some errors may be present due to limitations of this technology.             Saranya Millard MD  08/23/21 [id number]

## 2021-08-24 LAB
ANION GAP SERPL CALCULATED.3IONS-SCNC: 10 MMOL/L (ref 3–16)
BUN BLDV-MCNC: 50 MG/DL (ref 7–20)
CALCIUM SERPL-MCNC: 9.6 MG/DL (ref 8.3–10.6)
CHLORIDE BLD-SCNC: 97 MMOL/L (ref 99–110)
CHOLESTEROL, TOTAL: 104 MG/DL (ref 0–199)
CO2: 34 MMOL/L (ref 21–32)
CREAT SERPL-MCNC: 1.5 MG/DL (ref 0.8–1.3)
EKG ATRIAL RATE: 99 BPM
EKG DIAGNOSIS: NORMAL
EKG Q-T INTERVAL: 348 MS
EKG QRS DURATION: 82 MS
EKG QTC CALCULATION (BAZETT): 416 MS
EKG R AXIS: -71 DEGREES
EKG T AXIS: 132 DEGREES
EKG VENTRICULAR RATE: 86 BPM
GFR AFRICAN AMERICAN: 56
GFR NON-AFRICAN AMERICAN: 47
GLUCOSE BLD-MCNC: 134 MG/DL (ref 70–99)
GLUCOSE BLD-MCNC: 186 MG/DL (ref 70–99)
GLUCOSE BLD-MCNC: 200 MG/DL (ref 70–99)
GLUCOSE BLD-MCNC: 247 MG/DL (ref 70–99)
GLUCOSE BLD-MCNC: 277 MG/DL (ref 70–99)
GLUCOSE BLD-MCNC: 280 MG/DL (ref 70–99)
GLUCOSE BLD-MCNC: 301 MG/DL (ref 70–99)
GLUCOSE BLD-MCNC: 386 MG/DL (ref 70–99)
HDLC SERPL-MCNC: 33 MG/DL (ref 40–60)
LDL CHOLESTEROL CALCULATED: 49 MG/DL
MAGNESIUM: 2.2 MG/DL (ref 1.8–2.4)
PERFORMED ON: ABNORMAL
POTASSIUM SERPL-SCNC: 3.8 MMOL/L (ref 3.5–5.1)
SODIUM BLD-SCNC: 141 MMOL/L (ref 136–145)
TRIGL SERPL-MCNC: 110 MG/DL (ref 0–150)
TROPONIN: 0.03 NG/ML
TROPONIN: 0.04 NG/ML
TSH REFLEX FT4: 0.67 UIU/ML (ref 0.27–4.2)
TSH SERPL DL<=0.05 MIU/L-ACNC: 0.64 UIU/ML (ref 0.27–4.2)
VLDLC SERPL CALC-MCNC: 22 MG/DL

## 2021-08-24 PROCEDURE — 99223 1ST HOSP IP/OBS HIGH 75: CPT | Performed by: INTERNAL MEDICINE

## 2021-08-24 PROCEDURE — 84443 ASSAY THYROID STIM HORMONE: CPT

## 2021-08-24 PROCEDURE — 2580000003 HC RX 258: Performed by: INTERNAL MEDICINE

## 2021-08-24 PROCEDURE — 84484 ASSAY OF TROPONIN QUANT: CPT

## 2021-08-24 PROCEDURE — 1200000000 HC SEMI PRIVATE

## 2021-08-24 PROCEDURE — 6370000000 HC RX 637 (ALT 250 FOR IP): Performed by: INTERNAL MEDICINE

## 2021-08-24 PROCEDURE — 2700000000 HC OXYGEN THERAPY PER DAY

## 2021-08-24 PROCEDURE — 83735 ASSAY OF MAGNESIUM: CPT

## 2021-08-24 PROCEDURE — 80061 LIPID PANEL: CPT

## 2021-08-24 PROCEDURE — 6370000000 HC RX 637 (ALT 250 FOR IP): Performed by: REGISTERED NURSE

## 2021-08-24 PROCEDURE — 93010 ELECTROCARDIOGRAM REPORT: CPT | Performed by: INTERNAL MEDICINE

## 2021-08-24 PROCEDURE — 94761 N-INVAS EAR/PLS OXIMETRY MLT: CPT

## 2021-08-24 PROCEDURE — 6360000002 HC RX W HCPCS: Performed by: INTERNAL MEDICINE

## 2021-08-24 PROCEDURE — 80048 BASIC METABOLIC PNL TOTAL CA: CPT

## 2021-08-24 PROCEDURE — 36415 COLL VENOUS BLD VENIPUNCTURE: CPT

## 2021-08-24 RX ORDER — INSULIN GLARGINE 100 [IU]/ML
80 INJECTION, SOLUTION SUBCUTANEOUS 2 TIMES DAILY
Status: DISCONTINUED | OUTPATIENT
Start: 2021-08-24 | End: 2021-08-24

## 2021-08-24 RX ORDER — FUROSEMIDE 10 MG/ML
40 INJECTION INTRAMUSCULAR; INTRAVENOUS 2 TIMES DAILY
Status: DISCONTINUED | OUTPATIENT
Start: 2021-08-24 | End: 2021-08-27

## 2021-08-24 RX ORDER — POTASSIUM CHLORIDE 7.45 MG/ML
10 INJECTION INTRAVENOUS PRN
Status: DISCONTINUED | OUTPATIENT
Start: 2021-08-24 | End: 2021-08-24

## 2021-08-24 RX ORDER — SPIRONOLACTONE 25 MG/1
25 TABLET ORAL DAILY
Status: DISCONTINUED | OUTPATIENT
Start: 2021-08-24 | End: 2021-08-31 | Stop reason: HOSPADM

## 2021-08-24 RX ORDER — METOLAZONE 2.5 MG/1
2.5 TABLET ORAL DAILY
Status: DISCONTINUED | OUTPATIENT
Start: 2021-08-24 | End: 2021-08-31 | Stop reason: HOSPADM

## 2021-08-24 RX ORDER — POTASSIUM CHLORIDE 20 MEQ/1
40 TABLET, EXTENDED RELEASE ORAL PRN
Status: DISCONTINUED | OUTPATIENT
Start: 2021-08-24 | End: 2021-08-24

## 2021-08-24 RX ORDER — MORPHINE SULFATE 2 MG/ML
2 INJECTION, SOLUTION INTRAMUSCULAR; INTRAVENOUS EVERY 4 HOURS PRN
Status: DISCONTINUED | OUTPATIENT
Start: 2021-08-24 | End: 2021-08-31 | Stop reason: HOSPADM

## 2021-08-24 RX ORDER — SODIUM CHLORIDE 0.9 % (FLUSH) 0.9 %
5-40 SYRINGE (ML) INJECTION EVERY 12 HOURS SCHEDULED
Status: DISCONTINUED | OUTPATIENT
Start: 2021-08-24 | End: 2021-08-31 | Stop reason: HOSPADM

## 2021-08-24 RX ORDER — POLYETHYLENE GLYCOL 3350 17 G/17G
17 POWDER, FOR SOLUTION ORAL DAILY
Status: DISCONTINUED | OUTPATIENT
Start: 2021-08-24 | End: 2021-08-31 | Stop reason: HOSPADM

## 2021-08-24 RX ORDER — NITROGLYCERIN 0.4 MG/1
0.4 TABLET SUBLINGUAL EVERY 5 MIN PRN
Status: DISCONTINUED | OUTPATIENT
Start: 2021-08-24 | End: 2021-08-31 | Stop reason: HOSPADM

## 2021-08-24 RX ORDER — ONDANSETRON 2 MG/ML
4 INJECTION INTRAMUSCULAR; INTRAVENOUS EVERY 6 HOURS PRN
Status: DISCONTINUED | OUTPATIENT
Start: 2021-08-24 | End: 2021-08-31 | Stop reason: HOSPADM

## 2021-08-24 RX ORDER — FERROUS SULFATE 325(65) MG
325 TABLET ORAL
Status: DISCONTINUED | OUTPATIENT
Start: 2021-08-24 | End: 2021-08-31 | Stop reason: HOSPADM

## 2021-08-24 RX ORDER — ACETAMINOPHEN 325 MG/1
650 TABLET ORAL EVERY 6 HOURS PRN
Status: DISCONTINUED | OUTPATIENT
Start: 2021-08-24 | End: 2021-08-31 | Stop reason: HOSPADM

## 2021-08-24 RX ORDER — POTASSIUM CHLORIDE 750 MG/1
20 TABLET, EXTENDED RELEASE ORAL 2 TIMES DAILY
Status: DISCONTINUED | OUTPATIENT
Start: 2021-08-24 | End: 2021-08-25

## 2021-08-24 RX ORDER — ACETAMINOPHEN 500 MG
1000 TABLET ORAL 4 TIMES DAILY PRN
Status: DISCONTINUED | OUTPATIENT
Start: 2021-08-24 | End: 2021-08-24

## 2021-08-24 RX ORDER — ALLOPURINOL 300 MG/1
300 TABLET ORAL DAILY
Status: DISCONTINUED | OUTPATIENT
Start: 2021-08-24 | End: 2021-08-31 | Stop reason: HOSPADM

## 2021-08-24 RX ORDER — ASPIRIN 81 MG/1
81 TABLET, CHEWABLE ORAL DAILY
Status: DISCONTINUED | OUTPATIENT
Start: 2021-08-24 | End: 2021-08-31 | Stop reason: HOSPADM

## 2021-08-24 RX ORDER — ATORVASTATIN CALCIUM 40 MG/1
40 TABLET, FILM COATED ORAL NIGHTLY
Status: DISCONTINUED | OUTPATIENT
Start: 2021-08-24 | End: 2021-08-31 | Stop reason: HOSPADM

## 2021-08-24 RX ORDER — NICOTINE POLACRILEX 4 MG
15 LOZENGE BUCCAL PRN
Status: DISCONTINUED | OUTPATIENT
Start: 2021-08-24 | End: 2021-08-31 | Stop reason: HOSPADM

## 2021-08-24 RX ORDER — SENNA PLUS 8.6 MG/1
1 TABLET ORAL 2 TIMES DAILY
Status: DISCONTINUED | OUTPATIENT
Start: 2021-08-24 | End: 2021-08-31 | Stop reason: HOSPADM

## 2021-08-24 RX ORDER — ONDANSETRON 4 MG/1
4 TABLET, ORALLY DISINTEGRATING ORAL EVERY 8 HOURS PRN
Status: DISCONTINUED | OUTPATIENT
Start: 2021-08-24 | End: 2021-08-31 | Stop reason: HOSPADM

## 2021-08-24 RX ORDER — PREGABALIN 25 MG/1
50 CAPSULE ORAL
Status: DISCONTINUED | OUTPATIENT
Start: 2021-08-24 | End: 2021-08-31 | Stop reason: HOSPADM

## 2021-08-24 RX ORDER — INSULIN GLARGINE 100 [IU]/ML
0.25 INJECTION, SOLUTION SUBCUTANEOUS NIGHTLY
Status: DISCONTINUED | OUTPATIENT
Start: 2021-08-24 | End: 2021-08-25

## 2021-08-24 RX ORDER — HEPARIN SODIUM 5000 [USP'U]/ML
5000 INJECTION, SOLUTION INTRAVENOUS; SUBCUTANEOUS EVERY 8 HOURS SCHEDULED
Status: DISCONTINUED | OUTPATIENT
Start: 2021-08-24 | End: 2021-08-31 | Stop reason: HOSPADM

## 2021-08-24 RX ORDER — MAGNESIUM SULFATE IN WATER 40 MG/ML
2000 INJECTION, SOLUTION INTRAVENOUS PRN
Status: DISCONTINUED | OUTPATIENT
Start: 2021-08-24 | End: 2021-08-24

## 2021-08-24 RX ORDER — ISOSORBIDE MONONITRATE 30 MG/1
30 TABLET, EXTENDED RELEASE ORAL DAILY
Status: DISCONTINUED | OUTPATIENT
Start: 2021-08-24 | End: 2021-08-31 | Stop reason: HOSPADM

## 2021-08-24 RX ORDER — COLCHICINE 0.6 MG/1
0.6 TABLET ORAL DAILY
Status: DISCONTINUED | OUTPATIENT
Start: 2021-08-24 | End: 2021-08-31 | Stop reason: HOSPADM

## 2021-08-24 RX ORDER — OXYCODONE HYDROCHLORIDE AND ACETAMINOPHEN 5; 325 MG/1; MG/1
1 TABLET ORAL 4 TIMES DAILY
Status: DISCONTINUED | OUTPATIENT
Start: 2021-08-24 | End: 2021-08-31 | Stop reason: HOSPADM

## 2021-08-24 RX ORDER — DEXTROSE MONOHYDRATE 50 MG/ML
100 INJECTION, SOLUTION INTRAVENOUS PRN
Status: DISCONTINUED | OUTPATIENT
Start: 2021-08-24 | End: 2021-08-31 | Stop reason: HOSPADM

## 2021-08-24 RX ORDER — SODIUM CHLORIDE 9 MG/ML
25 INJECTION, SOLUTION INTRAVENOUS PRN
Status: DISCONTINUED | OUTPATIENT
Start: 2021-08-24 | End: 2021-08-31 | Stop reason: HOSPADM

## 2021-08-24 RX ORDER — ACETAMINOPHEN 650 MG/1
650 SUPPOSITORY RECTAL EVERY 6 HOURS PRN
Status: DISCONTINUED | OUTPATIENT
Start: 2021-08-24 | End: 2021-08-31 | Stop reason: HOSPADM

## 2021-08-24 RX ORDER — POLYETHYLENE GLYCOL 3350 17 G/17G
17 POWDER, FOR SOLUTION ORAL DAILY PRN
Status: DISCONTINUED | OUTPATIENT
Start: 2021-08-24 | End: 2021-08-31 | Stop reason: HOSPADM

## 2021-08-24 RX ORDER — SODIUM CHLORIDE 0.9 % (FLUSH) 0.9 %
5-40 SYRINGE (ML) INJECTION PRN
Status: DISCONTINUED | OUTPATIENT
Start: 2021-08-24 | End: 2021-08-31 | Stop reason: HOSPADM

## 2021-08-24 RX ORDER — NITROGLYCERIN 0.4 MG/1
0.4 TABLET SUBLINGUAL EVERY 5 MIN PRN
Status: DISCONTINUED | OUTPATIENT
Start: 2021-08-24 | End: 2021-08-24 | Stop reason: SDUPTHER

## 2021-08-24 RX ORDER — CARVEDILOL 25 MG/1
25 TABLET ORAL 2 TIMES DAILY WITH MEALS
Status: DISCONTINUED | OUTPATIENT
Start: 2021-08-24 | End: 2021-08-25

## 2021-08-24 RX ORDER — DEXTROSE MONOHYDRATE 25 G/50ML
12.5 INJECTION, SOLUTION INTRAVENOUS PRN
Status: DISCONTINUED | OUTPATIENT
Start: 2021-08-24 | End: 2021-08-31 | Stop reason: HOSPADM

## 2021-08-24 RX ADMIN — INSULIN LISPRO 8 UNITS: 100 INJECTION, SOLUTION INTRAVENOUS; SUBCUTANEOUS at 12:34

## 2021-08-24 RX ADMIN — ASPIRIN 81 MG 81 MG: 81 TABLET ORAL at 09:58

## 2021-08-24 RX ADMIN — CARVEDILOL 25 MG: 25 TABLET, FILM COATED ORAL at 09:59

## 2021-08-24 RX ADMIN — POLYETHYLENE GLYCOL 3350 17 G: 17 POWDER, FOR SOLUTION ORAL at 09:59

## 2021-08-24 RX ADMIN — HEPARIN SODIUM 5000 UNITS: 5000 INJECTION INTRAVENOUS; SUBCUTANEOUS at 06:07

## 2021-08-24 RX ADMIN — SENNOSIDES 8.6 MG: 8.6 TABLET, FILM COATED ORAL at 01:15

## 2021-08-24 RX ADMIN — OXYCODONE AND ACETAMINOPHEN 1 TABLET: 5; 325 TABLET ORAL at 01:15

## 2021-08-24 RX ADMIN — FERROUS SULFATE TAB 325 MG (65 MG ELEMENTAL FE) 325 MG: 325 (65 FE) TAB at 16:23

## 2021-08-24 RX ADMIN — POTASSIUM CHLORIDE 20 MEQ: 10 TABLET, EXTENDED RELEASE ORAL at 20:40

## 2021-08-24 RX ADMIN — PREGABALIN 50 MG: 25 CAPSULE ORAL at 17:20

## 2021-08-24 RX ADMIN — FERROUS SULFATE TAB 325 MG (65 MG ELEMENTAL FE) 325 MG: 325 (65 FE) TAB at 12:34

## 2021-08-24 RX ADMIN — ATORVASTATIN CALCIUM 40 MG: 40 TABLET, FILM COATED ORAL at 20:40

## 2021-08-24 RX ADMIN — CARVEDILOL 25 MG: 25 TABLET, FILM COATED ORAL at 16:23

## 2021-08-24 RX ADMIN — METOLAZONE 2.5 MG: 2.5 TABLET ORAL at 09:59

## 2021-08-24 RX ADMIN — ISOSORBIDE MONONITRATE 30 MG: 30 TABLET, EXTENDED RELEASE ORAL at 09:58

## 2021-08-24 RX ADMIN — INSULIN LISPRO 11 UNITS: 100 INJECTION, SOLUTION INTRAVENOUS; SUBCUTANEOUS at 12:34

## 2021-08-24 RX ADMIN — OXYCODONE AND ACETAMINOPHEN 1 TABLET: 5; 325 TABLET ORAL at 17:20

## 2021-08-24 RX ADMIN — FUROSEMIDE 40 MG: 10 INJECTION, SOLUTION INTRAMUSCULAR; INTRAVENOUS at 09:59

## 2021-08-24 RX ADMIN — COLCHICINE 0.6 MG: 0.6 TABLET ORAL at 09:59

## 2021-08-24 RX ADMIN — INSULIN LISPRO 2 UNITS: 100 INJECTION, SOLUTION INTRAVENOUS; SUBCUTANEOUS at 01:11

## 2021-08-24 RX ADMIN — INSULIN LISPRO 11 UNITS: 100 INJECTION, SOLUTION INTRAVENOUS; SUBCUTANEOUS at 10:17

## 2021-08-24 RX ADMIN — FERROUS SULFATE TAB 325 MG (65 MG ELEMENTAL FE) 325 MG: 325 (65 FE) TAB at 09:59

## 2021-08-24 RX ADMIN — FUROSEMIDE 40 MG: 10 INJECTION, SOLUTION INTRAMUSCULAR; INTRAVENOUS at 17:20

## 2021-08-24 RX ADMIN — SPIRONOLACTONE 25 MG: 25 TABLET, FILM COATED ORAL at 16:23

## 2021-08-24 RX ADMIN — INSULIN LISPRO 10 UNITS: 100 INJECTION, SOLUTION INTRAVENOUS; SUBCUTANEOUS at 10:16

## 2021-08-24 RX ADMIN — SENNOSIDES 8.6 MG: 8.6 TABLET, FILM COATED ORAL at 20:40

## 2021-08-24 RX ADMIN — SODIUM CHLORIDE, PRESERVATIVE FREE 10 ML: 5 INJECTION INTRAVENOUS at 10:05

## 2021-08-24 RX ADMIN — INSULIN LISPRO 3 UNITS: 100 INJECTION, SOLUTION INTRAVENOUS; SUBCUTANEOUS at 20:50

## 2021-08-24 RX ADMIN — SODIUM CHLORIDE, PRESERVATIVE FREE 10 ML: 5 INJECTION INTRAVENOUS at 20:43

## 2021-08-24 RX ADMIN — OXYCODONE AND ACETAMINOPHEN 1 TABLET: 5; 325 TABLET ORAL at 14:31

## 2021-08-24 RX ADMIN — ALLOPURINOL 300 MG: 300 TABLET ORAL at 09:58

## 2021-08-24 RX ADMIN — SENNOSIDES 8.6 MG: 8.6 TABLET, FILM COATED ORAL at 09:59

## 2021-08-24 RX ADMIN — HEPARIN SODIUM 5000 UNITS: 5000 INJECTION INTRAVENOUS; SUBCUTANEOUS at 20:46

## 2021-08-24 RX ADMIN — OXYCODONE AND ACETAMINOPHEN 1 TABLET: 5; 325 TABLET ORAL at 20:40

## 2021-08-24 RX ADMIN — INSULIN GLARGINE 33 UNITS: 100 INJECTION, SOLUTION SUBCUTANEOUS at 04:04

## 2021-08-24 RX ADMIN — OXYCODONE AND ACETAMINOPHEN 1 TABLET: 5; 325 TABLET ORAL at 09:59

## 2021-08-24 RX ADMIN — POTASSIUM CHLORIDE 20 MEQ: 10 TABLET, EXTENDED RELEASE ORAL at 09:59

## 2021-08-24 RX ADMIN — HEPARIN SODIUM 5000 UNITS: 5000 INJECTION INTRAVENOUS; SUBCUTANEOUS at 14:30

## 2021-08-24 ASSESSMENT — PAIN SCALES - GENERAL
PAINLEVEL_OUTOF10: 5
PAINLEVEL_OUTOF10: 8
PAINLEVEL_OUTOF10: 9
PAINLEVEL_OUTOF10: 8
PAINLEVEL_OUTOF10: 10

## 2021-08-24 NOTE — CONSULTS
Comprehensive Nutrition Assessment    Type and Reason for Visit:  Initial, Positive Nutrition Screen, Wound    Nutrition Recommendations/Plan:   1. Continue low sodium diet  2. Add carb control restriction  3. Monitor po intakes, nutrition adequacy, weights, pertinent labs, BMs    Nutrition Assessment:  Consult for diet education. Positive nutrition screen for wounds. Provided pt with written and verbal education on CHF diet. Briefly discussed low sodium diet. Pt reports that he does not salt his foods. Unable to provide further education as pt was difficult to redirect. Handout also discusses fluid restriction and weight monitoring. Pt currently on a low sodium diet with po intakes %. Pt noted to have some open wounds. Encourged po intake. Will monitor. Malnutrition Assessment:  Malnutrition Status:  No malnutrition      Nutrition Related Findings:  Non-pitting BLE edema. Receives MOW at home (5 meals/week)      Wounds:  Open Wounds       Current Nutrition Therapies:    ADULT DIET; Regular;  Low Sodium (2 gm)    Anthropometric Measures:  · Height: 5' 9\" (175.3 cm)  · Current Body Weight: 295 lb (133.8 kg)   · Ideal Body Weight: 160 lbs  · BMI: 43.5  · BMI Categories: Obese Class 3 (BMI 40.0 or greater)       Nutrition Diagnosis:   · Limited adherence to nutrition-related recommendations related to food and nutrition related knowledge deficit as evidenced by poor intake prior to admission      Nutrition Interventions:   Food and/or Nutrient Delivery:  Continue Current Diet  Nutrition Education/Counseling:  No recommendation at this time   Coordination of Nutrition Care:  Continue to monitor while inpatient    Goals:  Pt will have po intakes 50% or greater this admission       Nutrition Monitoring and Evaluation:   Behavioral-Environmental Outcomes:  None Identified   Food/Nutrient Intake Outcomes:  Food and Nutrient Intake  Physical Signs/Symptoms Outcomes:  Weight     Discharge Planning:    Continue current diet     Electronically signed by Magnolia Rankin RD, LD on 8/24/21 at 12:06 PM EDT    Contact: [phone]

## 2021-08-24 NOTE — CONSULTS
[facility] Continence Nurse  Consult Note       NAME:  Maria E Millard [facility]  MEDICAL RECORD NUMBER:  [id number]  AGE: 78 y.o. GENDER: male  : 1955  TODAY'S DATE:  2021    Subjective: I have a home care nurse that comes once a week to change the dressing. Reason for WOCN Evaluation and Assessment: Bilateral Lower Extremities - Venous Ulcer      Shay Colorado is a 78 y.o. male referred by:   [] Physician  [x] Nursing  [] Other:     Wound Identification:  Wound Type: venous  Contributing Factors: edema, venous stasis, diabetes, chronic pressure, decreased mobility, obesity and CKD Stage 3    Wound History: 78 y.o. male who presented to [facility] with above complaints  Patient with PMH of CAD s/p CABG, diastolic CHF, CKD, HTN, DM 2, HLD, sleep apnea, pulmonary hypertension presented to the ED today with complaints of chest pain. Patient reports retrosternal chest pain, burning in nature, feels like GERD, took PPI without much relief. Pain started few hours prior to arrival.  Very minimal shortness of breath associated with this. Denies any nausea vomiting or diaphoresis. No cough. No subjective fevers chills or rigors. No abdominal pain. Patient reportedly took 4 baby aspirin prior to arrival.  Did not take any NTG. Patient wears 4 L oxygen at home at baseline. Denied any palpitations or dizziness. He is a prior smoker, but currently denies any active smoking. He reports he has chronic leg wounds that are wrapped in Ace wrap, recently got it checked on Friday. Some mild chronic leg swelling. Patient reports he sleeps in a recliner all the time. Patient reports he is weight has been increasing the past few weeks, he reports his nephrologist put him back on torsemide and metolazone which she has been taking.   He reports he usually weighs 285 pounds, currently at 297 pounds  Current Wound Care Treatment: Bilateral Lower Extremities - alginate ag, dry dressing, roll gauze, ace wrap    Patient Goal of Care:  [x] Wound Healing  [] Odor Control  [] Palliative Care  [] Pain Control   [] Other:         PAST MEDICAL HISTORY        Diagnosis Date    Anemia     Angina     Arthritis     CAD (coronary artery disease)     [facility]est[facility] failure) [facility]     CKD (chronic kidney disease) stage 3, GFR 30-59 ml/min ([facility])     Clostridium difficile diarrhea 3/16/12; 12    positive stool toxin    Diabetes mellitus ([address]     Diabetic neuropathy ([address]     feet and legs    Disease of blood and blood forming organ     Dizziness     When he moves his head/ loses his balance    GERD (gastroesophageal reflux disease)     gastric ulcer    Headache     Hearing loss     Hyperlipidemia     Hypertension     Kidney stone     Refusal of blood transfusions as patient is [employer]     Sleep apnea     Tinnitus     Venous ulcer ([address]     LLE    Wound, open 2012       PAST SURGICAL HISTORY    Past Surgical History:   Procedure Laterality Date    CARDIAC SURGERY      Dec 27 2010, triple bypass    CHOLECYSTECTOMY  2011    HERNIA REPAIR  age1    OTHER SURGICAL HISTORY  11-16-11 REPAIR LOWER STERNAL INCISION, REMOVAL OF ONE STERNAL WIRE,    OTHER SURGICAL HISTORY  10/10/2014    phacoemulsification of cataract with intraocular lens implant left eye    UPPER GASTROINTESTINAL ENDOSCOPY         FAMILY HISTORY    Family History   Problem Relation Age of Onset    Heart Disease Mother     Heart Disease Father     Cancer Father     Diabetes Brother     Diabetes Brother        SOCIAL HISTORY    Social History     Tobacco Use    Smoking status: Former Smoker     Packs/day: 1.00     Years: 16.00     Pack years: 16.00     Quit date: 1/10/1988     Years since quittin.6    Smokeless tobacco: Never Used    Tobacco comment: 22 yrs ago   Vaping Use    Vaping Use: Never used   Substance Use Topics    Alcohol use: No     Alcohol/week: 0.0 standard drinks    Drug use:  No ALLERGIES    Allergies   Allergen Reactions    Amitriptyline Other (See Comments)     tremor    Celecoxib      Hyperkalemia    Cymbalta [Duloxetine Hcl] Other (See Comments)     Tremors and slurred speech    Elavil [Amitriptyline Hcl]      tremor    Gabapentin      Tremor at high dose    Nsaids      Gastric ulcer       MEDICATIONS    No current facility-administered medications on file prior to encounter. Current Outpatient Medications on File Prior to Encounter   Medication Sig Dispense Refill    oxyCODONE-acetaminophen (PERCOCET) 5-325 MG per tablet Take 1 tablet by mouth 4 times daily for 30 days. 120 tablet 0    [START ON 8/26/2021] oxyCODONE-acetaminophen (PERCOCET) 5-325 MG per tablet Take 1 tablet by mouth 4 times daily for 30 days. 120 tablet 0    linaCLOtide (LINZESS PO) Take by mouth      clotrimazole (LOTRIMIN) 1 % external solution Apply 4 drops to the left ear twice daily (morning and evening) for 10 days 29.57 mL 1    acetaminophen (TYLENOL) 500 MG tablet Take 2 tablets by mouth 4 times daily as needed for Pain 60 tablet 0    hydrocortisone (ANUSOL-HC) 2.5 % rectal cream Place rectally 2 times daily.  1 Tube 0    docusate sodium (COLACE, DULCOLAX) 100 MG CAPS Take 100 mg by mouth 2 times daily      metolazone (ZAROXOLYN) 5 MG tablet Take 1 tablet by mouth as needed (weight gain > 3#) 10 tablet 0    torsemide (DEMADEX) 100 MG tablet Take 1 tablet by mouth 2 times daily 30 tablet 3    insulin aspart (NOVOLOG) 100 UNIT/ML injection vial Inject into the skin 3 times daily (before meals) 50 units with breakfast, 20 units with lunch and 60 units with dinner      allopurinol (ZYLOPRIM) 100 MG tablet Take 300 mg by mouth       insulin glargine (LANTUS) 100 UNIT/ML injection vial Inject 55 Units into the skin 2 times daily (Patient taking differently: Inject 80 Units into the skin 2 times daily ) 1 vial 1    pregabalin (LYRICA) 50 MG capsule Take 1 capsule by mouth Daily with supper 60 capsule 3    carvedilol (COREG) 25 MG tablet Take 1 tablet by mouth 2 times daily (with meals) 180 tablet 3    fluticasone (FLONASE) 50 MCG/ACT nasal spray 1 spray by Nasal route nightly as needed.  aspirin 81 MG chewable tablet Take 1 tablet by mouth daily. 30 tablet     isosorbide mononitrate (IMDUR) 30 MG CR tablet Take 1 tablet by mouth daily. 30 tablet 0    ferrous sulfate 325 (65 FE) MG tablet Take 325 mg by mouth 3 times daily (with meals).  omeprazole (PRILOSEC) 20 MG capsule Take 20 mg by mouth 2 times daily.       senna (SENOKOT) 8.6 MG TABS tablet Take 1 tablet by mouth 2 times daily 120 tablet 0    colchicine (COLCRYS) 0.6 MG tablet Take 1 tablet by mouth daily 30 tablet 3    potassium chloride (KLOR-CON M) 20 MEQ TBCR extended release tablet Take 1 tablet by mouth 2 times daily (Patient not taking: Reported on 5/27/2021) 60 tablet 3    Compression Stockings MISC by Does not apply route 2 pair, knee high compression stockings, fitted/ zippered 2 each 1    silver sulfADIAZINE (SILVADENE) 1 % cream Apply to BL lower leg ulcers daily 20 g 0    nitroGLYCERIN (NITROSTAT) 0.4 MG SL tablet Place 1 tablet under the tongue every 5 minutes as needed 25 tablet 1       Objective: A/O lying on the couch, bilateral lower extremities wrapped    /79   Pulse 78   Temp 98 °F (36.7 °C) (Oral)   Resp 16   Ht 5' 9\" (1.753 m)   Wt 295 lb (133.8 kg)   SpO2 (!) 80%   BMI 43.56 kg/m²     LABS:  WBC:    Lab Results   Component Value Date    WBC 8.4 08/23/2021     H/H:    Lab Results   Component Value Date    HGB 11.9 08/23/2021    HCT 35.8 08/23/2021     PTT:    Lab Results   Component Value Date    APTT 32.9 01/05/2013   [APTT}  PT/INR:    Lab Results   Component Value Date    PROTIME 13.3 09/17/2015    INR 1.22 09/17/2015     HgBA1c:    Lab Results   Component Value Date    LABA1C 8.5 08/12/2021       Assessment: Bilateral Lower Extremities - 1 venous ulcer on each leg, red wound bed, periwound hemosiderin staining   John Risk Score: John Scale Score: 19    Patient Active Problem List   Diagnosis Code    DM (diabetes mellitus) ([facility] E11.9    Coronary artery disease involving native coronary artery of native heart without angina pectoris I25.10    Anemia of chronic disease D63.8    Essential hypertension I10    Hyperkalemia E87.5    Chronic diastolic heart failure ([facility]) I50.32    Pulmonary hypertension due to left ventricular diastolic dysfunction ([facility]) I27.22    Morbid obesity due to excess calories ([facility]) E66.01    S/P CABG x 3 Z95.1    DM2 (diabetes mellitus, type 2) ([facility]) E11.9    Morbid obesity with BMI of 50.0-59.9, adult ([facility]) E66.01, Z68.43    HLD (hyperlipidemia) E78.5    Cardiomyopathy ([facility]) I42.9    Productive cough R05    Chronic pain G89.29    Severe obstructive sleep apnea G47.33    Obesity, Class III, BMI 40-49.9 (morbid obesity) ([facility]) E66.01    Acute diastolic congestive heart failure ([facility]) I50.31    Degeneration of lumbar or lumbosacral intervertebral disc M51.37    Displacement of lumbar intervertebral disc without myelopathy M51.26    Lumbosacral spondylosis without myelopathy M47.817    Lumbar facet arthropathy M47.816    Disc displacement, lumbar M51.26    Spinal stenosis of lumbar region M48.061    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    Tympanic membrane perforation, left H72.92    Chest pain R07.9       Measurements:  Wound 08/24/21 Leg Left; Lower (Active)   Wound Image   08/24/21 1542   Wound Etiology Venous 08/24/21 1542   Dressing Status New drainage noted;New dressing applied 08/24/21 1542   Wound Cleansed Cleansed with saline 08/24/21 1542   Dressing/Treatment Alginate with Ag;Dry dressing;Roll gauze; Ace wrap 08/24/21 1542   Dressing Change Due 08/25/21 08/24/21 1542   Wound Length (cm) 2 cm 08/24/21 1542   Wound Width (cm) 2 cm 08/24/21 1542   Wound Depth (cm) 0.1 cm 08/24/21 1542   Wound Surface Area (cm^2) 4 cm^2 08/24/21 1542   Wound Volume (cm^3) 0.4 cm^3 08/24/21 1542   Wound Assessment Pink/red 08/24/21 1542   Drainage Amount Small 08/24/21 1542   Drainage Description Purulent; Thin 08/24/21 1542   Odor None 08/24/21 1542   Radha-wound Assessment Hemosiderin staining (brown yellow) 08/24/21 1542   Margins Defined edges 08/24/21 1542   Number of days: 0    L Lower Leg:         Wound 08/24/21 Leg Right; Lower (Active)   Wound Image   08/24/21 1542   Wound Etiology Venous 08/24/21 1542   Dressing Status New drainage noted;New dressing applied 08/24/21 1542   Wound Cleansed Cleansed with saline 08/24/21 1542   Dressing/Treatment Alginate with Ag;Dry dressing;Roll gauze; Ace wrap 08/24/21 1542   Dressing Change Due 08/25/21 08/24/21 1542   Wound Length (cm) 0.5 cm 08/24/21 1542   Wound Width (cm) 0.5 cm 08/24/21 1542   Wound Depth (cm) 0.1 cm 08/24/21 1542   Wound Surface Area (cm^2) 0.25 cm^2 08/24/21 1542   Wound Volume (cm^3) 0.025 cm^3 08/24/21 1542   Wound Assessment Bleeding;Pink/red 08/24/21 1542   Drainage Amount Small 08/24/21 1542   Drainage Description Sanguinous 08/24/21 1542   Odor None 08/24/21 1542   Radha-wound Assessment Hemosiderin staining (brown yellow) 08/24/21 1542   Margins Attached edges; Defined edges 08/24/21 1542   Number of days: 0   R Lower Leg:      Response to treatment:  Well tolerated by patient. Pain Assessment:  Severity:  0 / 10  Quality of pain: N/A  Wound Pain Timing/Severity: none  Premedicated: No    Plan   Plan of Care: Wound 08/24/21 Leg Left; Lower-Dressing/Treatment: Alginate with Ag, Dry dressing, Roll gauze, Ace wrap  Wound 08/24/21 Leg Right; Lower-Dressing/Treatment: Alginate with Ag, Dry dressing, Roll gauze, Ace wrap   Recommendation: Bilateral Lower Extremities - clean with NS, dry, alginate ag, dry dressing, roll gauze, ace wrap from toes - knee.  Change daily  Call Wound Care for deterioration [phone]    Zaid gutierrez to be applied by Wound Care 8/25/21    Specialty Bed Required : No   [] Low Air Loss   [] Pressure Redistribution  [] Fluid Immersion  [] Bariatric  [] Total Pressure Relief  [] Other:     Current Diet: ADULT DIET; Regular;  Low Sodium (2 gm); 1800 ml  Dietician consult:  Yes    Discharge Plan:  Placement for patient upon discharge: home with support    Patient appropriate for Outpatient [address]: Yes    Referrals:  [x]   [] [facility]  [] Supplies  [] Other    Patient/Caregiver Teaching:  Level of patient/caregiver understanding able to:   [] Indicates understanding       [] Needs reinforcement  [] Unsuccessful      [] Verbal Understanding  [] Demonstrated understanding       [] No evidence of learning  [] Refused teaching         [] N/A       Electronically signed by Anitra Young RN, Pan Cardona on 8/24/2021 at 3:48 PM

## 2021-08-24 NOTE — PROGRESS NOTES
Physician Progress Note      Dedra Mehta  CSN #:                  [id number]  :                       1955  ADMIT DATE:       2021 3:02 PM  [address] [place] DATE:  RESPONDING  PROVIDER #:        Makenna De León CNP          QUERY TEXT:    Pt admitted with CHF. Pt noted to wear home O2 at 4 L NC baseline and during   admission. If possible, please document in the progress notes and discharge   summary if you are evaluating and/or treating any of the following: The medical record reflects the following:  Risk Factors: CHF, obesity, HENNA,    Clinical Indicators: Per H&P and ED provider notes- \"Patient wears 4 L oxygen   at home at baseline\", CXR-Pulmonary vascular congestion/interstitial edema. No   blood gases done. Per nursing VS flow sheet- RR 15-23, 4 L NC with sats   90-96%  Per ED providers- \"Lungs clear to auscultation bilaterally. Respirations   nonlabored. Bula Dayhoff Bula Dayhoff Respirations unlabored. CTAB. Good air exchange. Speaking   comfortably in full sentences. \"  Per cardiologist - \"Clear to auscultation bilaterally, respirations unlabored\"    Treatment: O2 4L/NC, CXR, Cardiology consult, supportive care/monitoring   ongoing with CHF education    Thank you,  Ney Edwards RN, BSN, CRCR  [email or web address]  Options provided:  -- Chronic respiratory failure with hypoxia  -- Acute on chronic respiratory failure with hypoxia  -- Other - I will add my own diagnosis  -- Disagree - Not applicable / Not valid  -- Disagree - Clinically unable to determine / Unknown  -- Refer to Clinical Documentation Reviewer    PROVIDER RESPONSE TEXT:    This patient is in acute on chronic respiratory failure with hypoxia.     Query created by: Shailesh Hidalgo on 2021 2:02 PM      Electronically signed by:  Makenna De León CNP 2021 3:42 PM

## 2021-08-24 NOTE — PROGRESS NOTES
4 Eyes Skin Assessment     The patient is being assess for   Admission    I agree that 2 RN's have performed a thorough Head to Toe Skin Assessment on the patient. ALL assessment sites listed below have been assessed. Areas assessed for pressure by both nurses:   [x]   Head, Face, and Ears   [x]   Shoulders, Back, and Chest, Abdomen  [x]   Arms, Elbows, and Hands   [x]   Coccyx, Sacrum, and Ischium  [x]   Legs, Feet, and Heels               **SHARE this note so that the co-signing nurse is able to place an eSignature**    Co-signer eSignature: Electronically signed by Alexa Aguilera RN on 8/24/21 at 2:14 AM EDT    Does the Patient have Skin Breakdown related to pressure?   No            John Prevention initiated:  NA   Wound Care Orders initiated:  Yes      [id number]  nurse consulted for Pressure Injury (Stage 3,4, Unstageable, DTI, NWPT, Complex wounds)and New or Established Ostomies:  Yes      Primary Nurse eSignature: Electronically signed by Wellington Silverio RN on 8/24/21 at 1:57 AM EDT

## 2021-08-24 NOTE — CONSULTS
[address] [facility]  [phone]        Reason for Consultation/Chief Complaint: \"I have been asked to see him for CHF exacerbation and chest pain. Roberth Vuong \"    History of Present Illness:  Alexander Khan is a 78 y.o. patient who presented to the hospital with complaints of  Having increased shortness of breath and swelling of both legs for last two weeks. Legs have blisters and painful. He has chronic CHF. He feels no angina but acid reflux symptoms that is chronic  He has known CAD 4 vessel bypass 2011, post op infection chest wound. He has DM CKD obesity HENNA. HBP Hyperlipidemia  He follows with nephrologist and his PCP DR Biju Brandon. He followed with  Ifrah Moon CNP for heart failure but then stopped due to expense of co pay. He says he is compliant with meds and diet. H/O sleep apnea- BIPAP     Past Medical History:   has a past medical history of Anemia, Angina, Arthritis, CAD (coronary artery disease), CHF (congestive heart failure) ([address] CKD (chronic kidney disease) stage 3, GFR 30-59 ml/min ([facility]), Clostridium difficile diarrhea, Diabetes mellitus ([address] Diabetic neuropathy ([address] Disease of blood and blood forming organ, Dizziness, GERD (gastroesophageal reflux disease), Headache, Hearing loss, Hyperlipidemia, Hypertension, Kidney stone, Refusal of blood transfusions as patient is Islam, Sleep apnea, Tinnitus, Venous ulcer ([address] and Wound, open. Surgical History:   has a past surgical history that includes hernia repair (age1); Cholecystectomy (9/2011); other surgical history (11-16-11 REPAIR LOWER STERNAL INCISION, REMOVAL OF ONE STERNAL WIRE,); Upper gastrointestinal endoscopy; Cardiac surgery; and other surgical history (10/10/2014). Social History:   reports that he quit smoking about 33 years ago. He has a 16.00 pack-year smoking history. He has never used smokeless tobacco. He reports that he does not drink alcohol and does not use drugs.      Family History:  family history includes Cancer in his father; Diabetes in his brother and brother; Heart Disease in his father and mother. Home Medications:  Were reviewed and are listed in nursing record. and/or listed below  Prior to Admission medications    Medication Sig Start Date End Date Taking? Authorizing Provider   oxyCODONE-acetaminophen (PERCOCET) 5-325 MG per tablet Take 1 tablet by mouth 4 times daily for 30 days. 7/28/21 8/27/21 Yes Ronny Madrid MD   oxyCODONE-acetaminophen (PERCOCET) 5-325 MG per tablet Take 1 tablet by mouth 4 times daily for 30 days. 8/26/21 9/25/21 Yes Ronny Madrid MD   linaCLOtide (LINZESS PO) Take by mouth   Yes Historical Provider, MD   clotrimazole (LOTRIMIN) 1 % external solution Apply 4 drops to the left ear twice daily (morning and evening) for 10 days 10/5/20  Yes Fatimah Rivas MD   acetaminophen (TYLENOL) 500 MG tablet Take 2 tablets by mouth 4 times daily as needed for Pain 9/17/20  Yes PATRICK Beard CNP   hydrocortisone (ANUSOL-HC) 2.5 % rectal cream Place rectally 2 times daily.  8/7/19  Yes PATRICK King CNP   docusate sodium (COLACE, DULCOLAX) 100 MG CAPS Take 100 mg by mouth 2 times daily 5/1/18  Yes PATRICK Carrizales CNP   metolazone (ZAROXOLYN) 5 MG tablet Take 1 tablet by mouth as needed (weight gain > 3#) 5/1/18  Yes APTRICK Carrizales CNP   torsemide (DEMADEX) 100 MG tablet Take 1 tablet by mouth 2 times daily 5/2/18  Yes PATRICK Carrizales CNP   insulin aspart (NOVOLOG) 100 UNIT/ML injection vial Inject into the skin 3 times daily (before meals) 50 units with breakfast, 20 units with lunch and 60 units with dinner   Yes Historical Provider, MD   allopurinol (ZYLOPRIM) 100 MG tablet Take 300 mg by mouth    Yes Historical Provider, MD   insulin glargine (LANTUS) 100 UNIT/ML injection vial Inject 55 Units into the skin 2 times daily  Patient taking differently: Inject 80 Units into the skin 2 times daily  1/15/17  Yes Raphael Mosley MD   pregabalin (LYRICA) 50 MG capsule Take 1 capsule by mouth Daily with supper 2/9/16  Yes Niko Linda MD   carvedilol (COREG) 25 MG tablet Take 1 tablet by mouth 2 times daily (with meals) 8/31/15  Yes PATRICK Simmons CNP   fluticasone (FLONASE) 50 MCG/ACT nasal spray 1 spray by Nasal route nightly as needed. Yes Historical Provider, MD   aspirin 81 MG chewable tablet Take 1 tablet by mouth daily. 1/11/13  Yes Alonso Nowak MD   isosorbide mononitrate (IMDUR) 30 MG CR tablet Take 1 tablet by mouth daily. 1/11/13  Yes Alonso Nowak MD   ferrous sulfate 325 (65 FE) MG tablet Take 325 mg by mouth 3 times daily (with meals). 12/22/10  Yes Historical Provider, MD   omeprazole (PRILOSEC) 20 MG capsule Take 20 mg by mouth 2 times daily.  12/22/10  Yes Historical Provider, MD   senna (SENOKOT) 8.6 MG TABS tablet Take 1 tablet by mouth 2 times daily 7/8/19   PATRICK Medeiros CNP   colchicine (COLCRYS) 0.6 MG tablet Take 1 tablet by mouth daily 5/2/18   PATRICK Wesley CNP   potassium chloride (KLOR-CON M) 20 MEQ TBCR extended release tablet Take 1 tablet by mouth 2 times daily  Patient not taking: Reported on 5/27/2021 5/1/18   PATRICK Wesley CNP   Compression Stockings MISC by Does not apply route 2 pair, knee high compression stockings, fitted/ zippered 6/15/16   PATRICK Simmons CNP   silver sulfADIAZINE (SILVADENE) 1 % cream Apply to BL lower leg ulcers daily 2/9/16   Niko Linda MD   nitroGLYCERIN (NITROSTAT) 0.4 MG SL tablet Place 1 tablet under the tongue every 5 minutes as needed 8/17/15   PATRICK Simmons CNP        Allergies:  Amitriptyline, Celecoxib, Cymbalta [duloxetine hcl], Elavil [amitriptyline hcl], Gabapentin, and Nsaids     Review of Systems:   12 point ROS negative in all areas as listed below except as in [place]  Constitutional, EENT, GI, , Musculoskeletal, skin, neurological, hematological, endocrine, obesity due to excess calories ([facility]    S/P CABG x 3    DM2 (diabetes mellitus, type 2) ([facility]    Morbid obesity with BMI of 50.0-59.9, adult ([facility]    HLD (hyperlipidemia)    Cardiomyopathy ([facility]    Productive cough    Chronic pain    Severe obstructive sleep apnea    Obesity, Class III, BMI 40-49.9 (morbid obesity) (HCC)    Acute diastolic congestive heart failure (HCC)    Degeneration of lumbar or lumbosacral intervertebral disc    Displacement of lumbar intervertebral disc without myelopathy    Lumbosacral spondylosis without myelopathy    Lumbar facet arthropathy    Disc displacement, lumbar    Spinal stenosis of lumbar region    Mixed conductive and sensorineural hearing loss of left ear with restricted hearing of right ear    Tympanic membrane perforation, left    Chest pain

## 2021-08-24 NOTE — ED NOTES
Pt arrived for reports of chest pain since this morning. Pt stated that he took aspirin without relief. Pt arrived on 4L NC which is a baseline dose. Pt has BLE swelling. Respirations even and unlabored denies N/V/D. VSS at this time.       Morey Primrose, RN  08/23/21 5307

## 2021-08-24 NOTE — PROGRESS NOTES
Hospitalist Progress Note      PCP: Monik Washburn MD    Date of Admission: 8/23/2021    Chief Complaint: Chest pain     Hospital Course:   78 y.o. male who presented to [facility] with above complaints. Pt with PMH of CAD s/p CABG, diastolic CHF, CKD, HTN, DM 2, HLD, sleep apnea, pulmonary hypertension presented to the ED today with complaints of chest pain. Pt reports retrosternal chest pain, burning in nature, feels like GERD, took PPI without much relief. Pain started few hours prior to arrival. Very minimal shortness of breath associated with this. Denies any nausea vomiting or diaphoresis. No cough. No subjective fevers chills or rigors. No abdominal pain. Pt reportedly took 4 baby aspirin prior to arrival. Did not take any NTG. Pt wears 4 L oxygen at home at baseline. Denied any palpitations or dizziness. He is a prior smoker, but currently denies any active smoking. He reports he has chronic leg wounds that are wrapped in Ace wrap, recently got it checked on Friday. Some mild chronic leg swelling. Mary Hernandezolya reports he sleeps in a recliner all the time. Pt reports he is weight has been increasing the past few weeks, he reports his nephrologist put him back on torsemide and metolazone which she has been taking. He reports he usually weighs 285 pounds, currently at 297 pounds. Subjective:   Pt is on 4 LPM. Afebrile. VSS. No dyspnea at rest. No chest pain.       Medications:  Reviewed    Infusion Medications    sodium chloride      dextrose       Scheduled Medications    sodium chloride flush  5-40 mL IntraVENous 2 times per day    heparin (porcine)  5,000 Units Subcutaneous 3 times per day    furosemide  40 mg IntraVENous BID    allopurinol  300 mg Oral Daily    aspirin  81 mg Oral Daily    carvedilol  25 mg Oral BID WC    colchicine  0.6 mg Oral Daily    ferrous sulfate  325 mg Oral TID WC    isosorbide mononitrate  30 mg Oral Daily    oxyCODONE-acetaminophen  1 tablet Oral 4x Daily    polyethylene glycol  17 g Oral Daily    potassium chloride  20 mEq Oral BID    pregabalin  50 mg Oral Dinner    senna  1 tablet Oral BID    metOLazone  2.5 mg Oral Daily    insulin glargine  0.25 Units/kg Subcutaneous Nightly    insulin lispro  0.08 Units/kg Subcutaneous TID WC    insulin lispro  0-12 Units Subcutaneous TID WC    insulin lispro  0-6 Units Subcutaneous Nightly    atorvastatin  40 mg Oral Nightly    spironolactone  25 mg Oral Daily     PRN Meds: sodium chloride flush, sodium chloride, ondansetron **OR** ondansetron, polyethylene glycol, acetaminophen **OR** acetaminophen, perflutren lipid microspheres, potassium chloride **OR** potassium alternative oral replacement **OR** potassium chloride, magnesium sulfate, nitroGLYCERIN, morphine, glucose, dextrose, glucagon (rDNA), dextrose      Intake/Output Summary (Last 24 hours) at 8/24/2021 1553  Last data filed at 8/24/2021 1243  Gross per 24 hour   Intake 120 ml   Output 3800 ml   Net -3680 ml       Physical Exam Performed:    /79   Pulse 78   Temp 98 °F (36.7 °C) (Oral)   Resp 16   Ht 5' 9\" (1.753 m)   Wt 295 lb (133.8 kg)   SpO2 (!) 80%   BMI 43.56 kg/m²     General appearance: Obese male in no apparent distress, appears stated age and cooperative. HEENT: Pupils equal, round, and reactive to light. Conjunctivae/corneas clear. Neck: Supple, with full range of motion. No jugular venous distention. Trachea midline. Respiratory:  Normal respiratory effort. Clear to auscultation, bilaterally without Rales/Wheezes/Rhonchi. Cardiovascular: Regular rate and rhythm with normal S1/S2 without murmurs, rubs or gallops. Abdomen: Soft, non-tender, non-distended with normal bowel sounds. Musculoskeletal: No clubbing, cyanosis. ++BLE edema. Skin: Skin color, texture, turgor normal.  No rashes or lesions. Neurologic:  Neurovascularly intact without any focal sensory/motor deficits.  Cranial nerves: II-XII intact, grossly non-focal.  Psychiatric: Alert and oriented, thought content appropriate, normal insight  Capillary Refill: Brisk,3 seconds, normal   Peripheral Pulses: +2 palpable, equal bilaterally       Labs:   Recent Labs     08/23/21  1422   WBC 8.4   HGB 11.9*   HCT 35.8*        Recent Labs     08/23/21  1422 08/24/21  0546    141   K 4.0 3.8   CL 98* 97*   CO2 32 34*   BUN 61* 50*   CREATININE 1.5* 1.5*   CALCIUM 9.1 9.6     Recent Labs     08/23/21  1422   AST 16   ALT 12   BILITOT 0.4   ALKPHOS 93     Recent Labs     08/23/21  1802 08/24/21  0056 08/24/21  0546   TROPONINI 0.03* 0.04* 0.03*       Urinalysis:      Lab Results   Component Value Date    NITRU Negative 07/08/2019    WBCUA 0-2 11/05/2013    BACTERIA Rare 11/05/2013    RBCUA 0-2 11/05/2013    BLOODU Negative 07/08/2019    SPECGRAV 1.010 07/08/2019    GLUCOSEU Negative 07/08/2019    GLUCOSEU NEGATIVE 02/29/2012       Radiology:  XR CHEST PORTABLE   Final Result   Pulmonary vascular congestion/interstitial edema. Stable cardiomegaly. Assessment/Plan:    Active Hospital Problems    Diagnosis     Chest pain [R07.9]     Acute diastolic congestive heart failure (HCC) [I50.31]     Severe obstructive sleep apnea [G47.33]     HLD (hyperlipidemia) [E78.5]     Morbid obesity due to excess calories ([facility] [E66.01]     S/P CABG x 3 [Z95.1]     Essential hypertension [I10]     DM (diabetes mellitus) ([facility] [E11.9]     Coronary artery disease involving native coronary artery of native heart without angina pectoris [I25.10]        Acute diastolic CHF:  - BNP 3708, CXR showing pulmonary edema, vascular congestion.  - Echo 4/2018 showed LVEF 55 to 60%, grade 2 DD.  - Continue IV Lasix 40 mg twice daily.  - Strict I's/O and daily weights. - Cardiology consulted -- appreciate. - Obtain updated 2D echo.     Chest pain w/ elevated troponin in pt with CAD s/p CABG x3  - Troponin 0.04 --> 0.03.   Likely due to CHF.    - Last cath in 2012 showed widely patent grafts. - Cardiology consulted. - Monitor pt on telemetry. - Continue aspirin, statin, carvedilol, imdur. HTN:  - Controlled, continue home medication regimen.     CKD stage 3:  - Stable, monitor. Followed by Dr. Ayad Oconnell outpatient.     DM2:  - Unontrolled, A1c 8.5  - Continue home insulin regimen, added SSI with Accu-Cheks, hypoglycemia protocol.  - Carb-control diet.      HLD:  - Continue statin. Severe HENNA:  -Continue home BiPAP.     Morbid obesity:  - With Body mass index is 43.56 kg/m². Complicating assessment and treatment. Placing patient at risk for multiple co-morbidities as well as early death and contributing to the patient's presentation. Counseled on weight loss.        DVT Prophylaxis: SQ heparin   Diet: ADULT DIET; Regular;  Low Sodium (2 gm); 1800 ml  Code Status: Full Code    PT/OT Eval Status: not ordered     Dispo - 2-4 days pending clinical course     PATRICK Mustafa - CNP

## 2021-08-24 NOTE — CARE COORDINATION
CASE MANAGEMENT INITIAL ASSESSMENT      Reviewed chart and completed assessment via telephone with:Pt at bedside  Explained Case Management role/services. yes    Primary contact information:    Health Care Decision Maker :   Primary Decision Maker: Rodrigo Morrissey - [place] - [phone]          Can this person be reached and be able to respond quickly, such as within a few minutes or hours? Yes  Who would be your back-up decision maker? Name   Phone Number:    Admit date/status:8/23/2021 IP  Diagnosis:CP, CHF  Is this a Readmission?:  No      Insurance:[facility]   Precert required for SNF: Yes       3 night stay required: No    Living arrangements, Adls, care needs, prior to admission:Lives alone in apt w no VENITA. Primarily IPTA- has Rohit [address] [RN] unsure of agency. Has hskpg services thru [facility]- would like to have HHA also if possible. Has wheeled walker and cane- O2 states w [employer]. Also has cpap. [facility]    Durable Medical Equipment at home:  Walker_x_Cane_x_RTS__ BSC__Shower Chair__  02__ HHN__ CPAP_x_  BiPap__  Hospital Bed__ W/C___ Other__________  Services in the home and/or outpatient, prior to [facility]- unsure of agency and housekeeping thru [address]. ·     PT/OT recs:None yet    Hospital Exemption Notification (HEN):If SNF    Barriers to discharge:Lives alone    Plan/comments:Plans home at this time w resumption of services. Call placed to Roshan Lackey [phone] to determine what services pt receiving, if eligible for HHA, and if they know who skilled agency is- message left. CM will follow.        ECOC on chart for MD maximilian Watkins, RN

## 2021-08-25 LAB
ANION GAP SERPL CALCULATED.3IONS-SCNC: 11 MMOL/L (ref 3–16)
BUN BLDV-MCNC: 44 MG/DL (ref 7–20)
CALCIUM SERPL-MCNC: 9.7 MG/DL (ref 8.3–10.6)
CHLORIDE BLD-SCNC: 97 MMOL/L (ref 99–110)
CO2: 30 MMOL/L (ref 21–32)
CREAT SERPL-MCNC: 1.3 MG/DL (ref 0.8–1.3)
EKG ATRIAL RATE: 202 BPM
EKG DIAGNOSIS: NORMAL
EKG P AXIS: 259 DEGREES
EKG Q-T INTERVAL: 400 MS
EKG QRS DURATION: 82 MS
EKG QTC CALCULATION (BAZETT): 446 MS
EKG R AXIS: -42 DEGREES
EKG T AXIS: 118 DEGREES
EKG VENTRICULAR RATE: 75 BPM
GFR AFRICAN AMERICAN: >60
GFR NON-AFRICAN AMERICAN: 55
GLUCOSE BLD-MCNC: 232 MG/DL (ref 70–99)
GLUCOSE BLD-MCNC: 237 MG/DL (ref 70–99)
GLUCOSE BLD-MCNC: 280 MG/DL (ref 70–99)
GLUCOSE BLD-MCNC: 297 MG/DL (ref 70–99)
GLUCOSE BLD-MCNC: 327 MG/DL (ref 70–99)
LV EF: 58 %
LVEF MODALITY: NORMAL
MAGNESIUM: 2.3 MG/DL (ref 1.8–2.4)
PERFORMED ON: ABNORMAL
POTASSIUM SERPL-SCNC: 4.2 MMOL/L (ref 3.5–5.1)
SODIUM BLD-SCNC: 138 MMOL/L (ref 136–145)

## 2021-08-25 PROCEDURE — 80048 BASIC METABOLIC PNL TOTAL CA: CPT

## 2021-08-25 PROCEDURE — 94761 N-INVAS EAR/PLS OXIMETRY MLT: CPT

## 2021-08-25 PROCEDURE — 6370000000 HC RX 637 (ALT 250 FOR IP): Performed by: INTERNAL MEDICINE

## 2021-08-25 PROCEDURE — 97530 THERAPEUTIC ACTIVITIES: CPT

## 2021-08-25 PROCEDURE — 99233 SBSQ HOSP IP/OBS HIGH 50: CPT | Performed by: INTERNAL MEDICINE

## 2021-08-25 PROCEDURE — 83735 ASSAY OF MAGNESIUM: CPT

## 2021-08-25 PROCEDURE — 2500000003 HC RX 250 WO HCPCS: Performed by: REGISTERED NURSE

## 2021-08-25 PROCEDURE — C8929 TTE W OR WO FOL WCON,DOPPLER: HCPCS

## 2021-08-25 PROCEDURE — 97116 GAIT TRAINING THERAPY: CPT

## 2021-08-25 PROCEDURE — 6370000000 HC RX 637 (ALT 250 FOR IP): Performed by: REGISTERED NURSE

## 2021-08-25 PROCEDURE — 6360000002 HC RX W HCPCS: Performed by: INTERNAL MEDICINE

## 2021-08-25 PROCEDURE — 2580000003 HC RX 258: Performed by: INTERNAL MEDICINE

## 2021-08-25 PROCEDURE — 2700000000 HC OXYGEN THERAPY PER DAY

## 2021-08-25 PROCEDURE — 93010 ELECTROCARDIOGRAM REPORT: CPT | Performed by: INTERNAL MEDICINE

## 2021-08-25 PROCEDURE — 97166 OT EVAL MOD COMPLEX 45 MIN: CPT

## 2021-08-25 PROCEDURE — 1200000000 HC SEMI PRIVATE

## 2021-08-25 PROCEDURE — 36415 COLL VENOUS BLD VENIPUNCTURE: CPT

## 2021-08-25 PROCEDURE — 93005 ELECTROCARDIOGRAM TRACING: CPT | Performed by: INTERNAL MEDICINE

## 2021-08-25 PROCEDURE — 97535 SELF CARE MNGMENT TRAINING: CPT

## 2021-08-25 PROCEDURE — 97161 PT EVAL LOW COMPLEX 20 MIN: CPT

## 2021-08-25 RX ORDER — CARVEDILOL 6.25 MG/1
12.5 TABLET ORAL 2 TIMES DAILY WITH MEALS
Status: DISCONTINUED | OUTPATIENT
Start: 2021-08-25 | End: 2021-08-31 | Stop reason: HOSPADM

## 2021-08-25 RX ORDER — POTASSIUM CHLORIDE 20 MEQ/1
20 TABLET, EXTENDED RELEASE ORAL DAILY
Status: DISCONTINUED | OUTPATIENT
Start: 2021-08-26 | End: 2021-08-31 | Stop reason: HOSPADM

## 2021-08-25 RX ORDER — INSULIN GLARGINE 100 [IU]/ML
40 INJECTION, SOLUTION SUBCUTANEOUS NIGHTLY
Status: DISCONTINUED | OUTPATIENT
Start: 2021-08-25 | End: 2021-08-31 | Stop reason: HOSPADM

## 2021-08-25 RX ADMIN — SODIUM CHLORIDE, PRESERVATIVE FREE 10 ML: 5 INJECTION INTRAVENOUS at 09:48

## 2021-08-25 RX ADMIN — SODIUM CHLORIDE, PRESERVATIVE FREE 10 ML: 5 INJECTION INTRAVENOUS at 21:07

## 2021-08-25 RX ADMIN — INSULIN LISPRO 9 UNITS: 100 INJECTION, SOLUTION INTRAVENOUS; SUBCUTANEOUS at 18:11

## 2021-08-25 RX ADMIN — ALLOPURINOL 300 MG: 300 TABLET ORAL at 09:48

## 2021-08-25 RX ADMIN — ATORVASTATIN CALCIUM 40 MG: 40 TABLET, FILM COATED ORAL at 21:06

## 2021-08-25 RX ADMIN — FUROSEMIDE 40 MG: 10 INJECTION, SOLUTION INTRAMUSCULAR; INTRAVENOUS at 09:48

## 2021-08-25 RX ADMIN — OXYCODONE AND ACETAMINOPHEN 1 TABLET: 5; 325 TABLET ORAL at 09:48

## 2021-08-25 RX ADMIN — FERROUS SULFATE TAB 325 MG (65 MG ELEMENTAL FE) 325 MG: 325 (65 FE) TAB at 18:10

## 2021-08-25 RX ADMIN — OXYCODONE AND ACETAMINOPHEN 1 TABLET: 5; 325 TABLET ORAL at 21:06

## 2021-08-25 RX ADMIN — Medication 1 EACH: at 16:31

## 2021-08-25 RX ADMIN — OXYCODONE AND ACETAMINOPHEN 1 TABLET: 5; 325 TABLET ORAL at 12:45

## 2021-08-25 RX ADMIN — HEPARIN SODIUM 5000 UNITS: 5000 INJECTION INTRAVENOUS; SUBCUTANEOUS at 21:07

## 2021-08-25 RX ADMIN — POLYETHYLENE GLYCOL 3350 17 G: 17 POWDER, FOR SOLUTION ORAL at 05:49

## 2021-08-25 RX ADMIN — ACETAMINOPHEN 650 MG: 325 TABLET ORAL at 05:49

## 2021-08-25 RX ADMIN — SENNOSIDES 8.6 MG: 8.6 TABLET, FILM COATED ORAL at 09:48

## 2021-08-25 RX ADMIN — ASPIRIN 81 MG 81 MG: 81 TABLET ORAL at 09:48

## 2021-08-25 RX ADMIN — PREGABALIN 50 MG: 25 CAPSULE ORAL at 18:10

## 2021-08-25 RX ADMIN — CARVEDILOL 12.5 MG: 6.25 TABLET, FILM COATED ORAL at 18:10

## 2021-08-25 RX ADMIN — INSULIN LISPRO 5 UNITS: 100 INJECTION, SOLUTION INTRAVENOUS; SUBCUTANEOUS at 21:07

## 2021-08-25 RX ADMIN — SPIRONOLACTONE 25 MG: 25 TABLET, FILM COATED ORAL at 09:48

## 2021-08-25 RX ADMIN — HEPARIN SODIUM 5000 UNITS: 5000 INJECTION INTRAVENOUS; SUBCUTANEOUS at 14:19

## 2021-08-25 RX ADMIN — POTASSIUM CHLORIDE 20 MEQ: 10 TABLET, EXTENDED RELEASE ORAL at 09:48

## 2021-08-25 RX ADMIN — INSULIN LISPRO 9 UNITS: 100 INJECTION, SOLUTION INTRAVENOUS; SUBCUTANEOUS at 17:46

## 2021-08-25 RX ADMIN — INSULIN LISPRO 5 UNITS: 100 INJECTION, SOLUTION INTRAVENOUS; SUBCUTANEOUS at 18:11

## 2021-08-25 RX ADMIN — FUROSEMIDE 40 MG: 10 INJECTION, SOLUTION INTRAMUSCULAR; INTRAVENOUS at 18:10

## 2021-08-25 RX ADMIN — HEPARIN SODIUM 5000 UNITS: 5000 INJECTION INTRAVENOUS; SUBCUTANEOUS at 05:39

## 2021-08-25 RX ADMIN — INSULIN GLARGINE 40 UNITS: 100 INJECTION, SOLUTION SUBCUTANEOUS at 21:06

## 2021-08-25 RX ADMIN — SENNOSIDES 8.6 MG: 8.6 TABLET, FILM COATED ORAL at 21:06

## 2021-08-25 RX ADMIN — METOLAZONE 2.5 MG: 2.5 TABLET ORAL at 09:48

## 2021-08-25 RX ADMIN — POLYETHYLENE GLYCOL 3350 17 G: 17 POWDER, FOR SOLUTION ORAL at 09:48

## 2021-08-25 RX ADMIN — FERROUS SULFATE TAB 325 MG (65 MG ELEMENTAL FE) 325 MG: 325 (65 FE) TAB at 14:22

## 2021-08-25 RX ADMIN — COLCHICINE 0.6 MG: 0.6 TABLET ORAL at 09:48

## 2021-08-25 RX ADMIN — CARVEDILOL 25 MG: 25 TABLET, FILM COATED ORAL at 09:48

## 2021-08-25 RX ADMIN — OXYCODONE AND ACETAMINOPHEN 1 TABLET: 5; 325 TABLET ORAL at 18:10

## 2021-08-25 RX ADMIN — ISOSORBIDE MONONITRATE 30 MG: 30 TABLET, EXTENDED RELEASE ORAL at 09:48

## 2021-08-25 RX ADMIN — FERROUS SULFATE TAB 325 MG (65 MG ELEMENTAL FE) 325 MG: 325 (65 FE) TAB at 09:48

## 2021-08-25 ASSESSMENT — PAIN DESCRIPTION - LOCATION
LOCATION: LEG
LOCATION: LEG

## 2021-08-25 ASSESSMENT — PAIN DESCRIPTION - ORIENTATION
ORIENTATION: RIGHT;LEFT
ORIENTATION: LEFT;RIGHT

## 2021-08-25 ASSESSMENT — PAIN SCALES - GENERAL
PAINLEVEL_OUTOF10: 3
PAINLEVEL_OUTOF10: 6
PAINLEVEL_OUTOF10: 6
PAINLEVEL_OUTOF10: 7
PAINLEVEL_OUTOF10: 8
PAINLEVEL_OUTOF10: 6
PAINLEVEL_OUTOF10: 8

## 2021-08-25 ASSESSMENT — PAIN DESCRIPTION - PAIN TYPE
TYPE: ACUTE PAIN
TYPE: ACUTE PAIN

## 2021-08-25 NOTE — PROGRESS NOTES
Occupational Therapy   Occupational Therapy Initial Assessment and Treatment Note 1x  Date: 2021   Patient Name: Colin Clayton  MRN: [id number]     : 1955    Date of Service: 2021    Discharge Recommendations:  Home with assist PRN, Home with Home health OT     Assessment   Assessment: OT eval completed. Pt admitted for chest pain. He lives alone and was Ind with self-care and ambulation with AD prior to onset. During OT session, pt was Mod I with in-room ambulation, transfers and LE ADL tasks. Pt can reach to LE for ADLs with increased time and in seated position. Recommend HHOT for home safety, transfers and ADLs. OT in acute care will sign off. Decision Making: Medium Complexity  OT Education: OT Role;IADL Safety;Equipment  Disease Specific Education: Pt educated on importance of OOB mobility, prevention of complications of bedrest, and general safety during hospitalization. Pt verbalized understanding    REQUIRES OT FOLLOW UP: Yes  Activity Tolerance  Activity Tolerance: Patient Tolerated treatment well  Safety Devices  Safety Devices in place: Yes  Type of devices: Gait belt;Call light within reach; Left in bed;Nurse notified         Patient Diagnosis(es): The primary encounter diagnosis was Chest pain, unspecified type. Diagnoses of Acute on chronic congestive heart failure, unspecified heart failure type (HCC) and Elevated troponin were also pertinent to this visit.      has a past medical history of Anemia, Angina, Arthritis, CAD (coronary artery disease), CHF (congestive heart failure) ([facility] CKD (chronic kidney disease) stage 3, GFR 30-59 ml/min ([facility]), Clostridium difficile diarrhea, Diabetes mellitus ([address] Diabetic neuropathy ([facility] Disease of blood and blood forming organ, Dizziness, GERD (gastroesophageal reflux disease), Headache, Hearing loss, Hyperlipidemia, Hypertension, Kidney stone, Refusal of blood transfusions as patient is Hoahaoism, Sleep apnea, Tinnitus, Venous ulcer ([address] and Wound, open. has a past surgical history that includes hernia repair (age2); Cholecystectomy (9/2011); other surgical history (11-16-11 REPAIR LOWER STERNAL INCISION, REMOVAL OF ONE STERNAL WIRE,); Upper gastrointestinal endoscopy; Cardiac surgery; and other surgical history (10/10/2014). Restrictions  Restrictions/Precautions  Restrictions/Precautions: General Precautions, Up as Tolerated  Position Activity Restriction  Other position/activity restrictions: 3LO2    Subjective   General  Chart Reviewed: Yes  Patient assessed for rehabilitation services?: Yes  Additional Pertinent Hx: hx venous stasis wounds BLE  Family / Caregiver Present: No  Referring Practitioner: BAUTISTA Carney  Diagnosis: chest pain  Subjective  Subjective: Pt agreeable to OT  General Comment  Comments: RN approved therapy  Patient Currently in Pain: Yes  Pain Assessment  Pain Assessment: 0-10  Pain Level: 6  Pain Type: Acute pain  Pain Location: Leg  Pain Orientation: Left;Right  Non-Pharmaceutical Pain Intervention(s): Ambulation/Increased Activity;Repositioned; Therapeutic presence  Response to Pain Intervention: Patient Satisfied  Vital Signs  Pulse: 78  Heart Rate Source: Monitor  BP: 123/66  BP Location: Right upper arm  Patient Position: Sitting  Level of Consciousness: Alert (0)  Patient Currently in Pain: Yes  Oxygen Therapy  SpO2: 94 %  Pulse Oximeter Device Mode: Intermittent  Pulse Oximeter Device Location: Finger  Social/Functional History  Social/Functional History  Lives With: Alone  Type of Home: Apartment  Home Layout: One level  Home Access: Stairs to enter with rails  Entrance Stairs - Number of Steps: 2  Entrance Stairs - Rails: Both  Bathroom Shower/Tub: Tub/Shower unit  Bathroom Toilet: Standard  Bathroom Equipment: Grab bars in shower  Home Equipment: 4 wheeled walker, Cane, Oxygen (3-4LO2 at night)  Receives Help From: Home health ([address] [place] [address] and HHPT and RN for wound care)  ADL Assistance: Independent  Homemaking Assistance: Needs assistance (Pt can use microwave for meals. 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 and 60% vision in L eye)  Hearing: Exceptions to [facility]  Hearing Exceptions: Hard of hearing/hearing concerns; No hearing aid    Orientation  Overall Orientation Status: Within Functional Limits     Balance  Sitting Balance: Independent  Standing Balance: Modified independent  (SW)  Standing Balance  Time: 3 min x2  Activity: Pt able to  object from floor and retrieve items from closet  Functional Mobility  Functional - Mobility Device: Standard Walker  Activity: To/from bathroom;Transport items; Retrieve items (in room)  Assist Level: Modified independent   Toilet Transfers  Toilet - Technique: Ambulating  Equipment Used: Grab bars  Toilet Transfer: Modified independent (sw)  ADL  Feeding: Independent  Grooming: Independent  UE Dressing: Independent  LE Dressing: Independent (able to reach to BLE for dressing)  Tone RUE  RUE Tone: Normotonic  Tone LUE  LUE Tone: Normotonic  Coordination  Movements Are Fluid And Coordinated: Yes  Quality of Movement Other  Comment: hx of B RTC injuries        Transfers  Stand Pivot Transfers: Modified independent (sw)  Sit to stand: Modified independent  Stand to sit: Modified independent     Cognition  Overall Cognitive Status: WFL       LUE AROM (degrees)  LUE AROM : WFL  RUE AROM (degrees)  RUE AROM : WFL  LUE Strength  Gross LUE Strength: WFL  RUE Strength  Gross RUE Strength: WFL     AM-PAC Score   AM-PAC Inpatient Daily Activity Raw Score: 23 (08/25/21 1736)  AM-PAC Inpatient ADL T-Scale Score : 51.12 (08/25/21 1736)  ADL

## 2021-08-25 NOTE — PROGRESS NOTES
Writer attempted to see patient at bedside. Pt asleep on sofa. Attempted verbal stimuli and pt did not wake, was snoring. Will attempt to see patient again at another time.  Miguel Pa RN

## 2021-08-25 NOTE — PROGRESS NOTES
[facility] Wound Ostomy Continence Nurse  Follow-up Progress Note       NAME:  Caridad Cerrato  MEDICAL RECORD NUMBER:  [id number]  AGE:  78 y.o. GENDER:  male  :  1955  TODAY'S DATE:  2021    Subjective:  I heard you were going to put my wraps back on. This will be the 5th week. Wound Identification:  Wound Type: venous  Contributing Factors: edema, venous stasis, diabetes, chronic pressure, decreased mobility, obesity and CKD Stage 3         Patient Goal of Care:  [x] Wound Healing  [] Odor Control   [] Palliative Care  [] Pain Control   [] Other:     Objective:  Sitting on side of the bed. Ace wraps in place on both lower legs. /65   Pulse 83   Temp 97.7 °F (36.5 °C) (Oral)   Resp 18   Ht 5' 9\" (1.753 m)   Wt (!) 311 lb (141.1 kg)   SpO2 94%   BMI 45.93 kg/m²   John Risk Score: John Scale Score: 19  Assessment:  Bilateral Lower Extremities - 1 venous ulcer on each leg, red wound bed, periwound hemosiderin staining   Measurements:  Wound 21 Leg Left; Lower (Active)   Wound Image   21 1542   Wound Etiology Venous 21 1634   Dressing Status New drainage noted;New dressing applied 21 1634   Wound Cleansed Wound cleanser;Cleansed with saline 21 1634   Dressing/Treatment Alginate with Ag;Calmoseptine/zinc oxide with menthol;Coban/self-adherent bandages 21 1634   Dressing Change Due 21 1634   Wound Length (cm) 2 cm 21 1542   Wound Width (cm) 2 cm 21 1542   Wound Depth (cm) 0.1 cm 21 1542   Wound Surface Area (cm^2) 4 cm^2 21 1542   Wound Volume (cm^3) 0.4 cm^3 21 1542   Distance Tunneling (cm) 0 cm 21 1634   Tunneling Position ___ O'Clock 0 21 1634   Undermining Starts ___ O'Clock 0 21 1634   Undermining Ends___ O'Clock 0 21 1634   Wound Assessment Pink/red 21 1634   Drainage Amount Small 21 1634   Drainage Description Purulent; Thin 21 1634   Odor None 08/25/21 1634   Radha-wound Assessment Hemosiderin staining (brown yellow) 08/25/21 1634   Margins Defined edges 08/25/21 1634   Wound Thickness Description not for Pressure Injury Partial thickness 08/25/21 1634   Number of days: 1       Wound 08/24/21 Leg Right; Lower (Active)   Wound Image   08/24/21 1542   Wound Etiology Venous 08/25/21 1634   Dressing Status New drainage noted;New dressing applied 08/25/21 1634   Wound Cleansed Cleansed with saline 08/25/21 1634   Dressing/Treatment Alginate with Ag;Dry dressing;Roll gauze; Ace wrap 08/25/21 1634   Dressing Change Due 09/01/21 08/25/21 1634   Wound Length (cm) 0.5 cm 08/24/21 1542   Wound Width (cm) 0.5 cm 08/24/21 1542   Wound Depth (cm) 0.1 cm 08/24/21 1542   Wound Surface Area (cm^2) 0.25 cm^2 08/24/21 1542   Wound Volume (cm^3) 0.025 cm^3 08/24/21 1542   Distance Tunneling (cm) 0 cm 08/25/21 1634   Tunneling Position ___ O'Clock 0 08/25/21 1634   Undermining Starts ___ O'Clock 0 08/25/21 1634   Undermining Ends___ O'Clock 0 08/25/21 1634   Undermining Maxium Distance (cm) 0 08/25/21 1634   Wound Assessment Pink/red 08/25/21 1634   Drainage Amount Small 08/25/21 1634   Drainage Description Serosanguinous 08/25/21 1634   Odor None 08/25/21 1634   Radha-wound Assessment Hemosiderin staining (brown yellow) 08/25/21 1634   Margins Attached edges; Defined edges 08/25/21 1634   Wound Thickness Description not for Pressure Injury Partial thickness 08/25/21 1634   Number of days: 1          Response to treatment:  Well tolerated by patient. Pain Assessment:  Severity:  0 / 10  Quality of pain: N/A  Wound Pain Timing/Severity: none  Premedicated: No  Plan:   Plan of Care: Wound 08/24/21 Leg Left; Lower-Dressing/Treatment: Alginate with Ag, Calmoseptine/zinc oxide with menthol, Coban/self-adherent bandages  Wound 08/24/21 Leg Right; Lower-Dressing/Treatment: Alginate with Ag, Dry dressing, Roll gauze, Ace wrap    Current dressing removed.   Sliver Alginate applied over the 2 open wounds on right and left lower leg.  2 layer unna boot with gel cast and coban wraps applied to both legs (per order). Plan to change on 9/1/21. Wound care to follow and re-apply if still hospitalized. Specialty Bed Required : Yes   [] Low Air Loss   [x] Pressure Redistribution  [] Fluid Immersion  [] Bariatric  [] Total Pressure Relief  [] Other:     Current Diet: ADULT DIET; Regular; 5 carb choices (75 gm/meal); Low Sodium (2 gm); 1800 ml  Dietician consult:  Yes    Discharge Plan:  Placement for patient upon discharge: home with support   Patient appropriate for [address]: Yes states he has a nurse practitioner that comes to his home.     Referrals:  []  following  [] [facility]  [] Supplies  [] Other    Patient/Caregiver Teaching:   Level of patient/caregiver understanding able to:   [] Indicates understanding       [] Needs reinforcement  [] Unsuccessful      [] Verbal Understanding  [] Demonstrated understanding       [] No evidence of learning  [] Refused teaching         [x] N/A       Electronically signed by Meera Mixon RN, MSN, Aaron Acuna on 8/25/2021 at 4:37 PM

## 2021-08-25 NOTE — PROGRESS NOTES
[facility] Daily Progress Note      Admit Date:  8/23/2021    Subjective:  Mr. Sara Henry is seen for cardiology follow up.        Reason for Consultation/Chief Complaint: \"I have been asked to see him for CHF exacerbation and chest pain. Harleen Money \"  Sharda Agosto is sitting on side of bed c/o constipation related to his narcotic use. History of Present Illness:  Michelle Wallace is a 78 y.o. patient who presented to the hospital with complaints of  Having increased shortness of breath and swelling of both legs for last two weeks. Legs have blisters and painful. He has chronic CHF. He feels no angina but acid reflux symptoms that is chronic  He has known CAD 4 vessel bypass 2011, post op infection chest wound. He has DM CKD obesity HENNA. HBP Hyperlipidemia  He follows with nephrologist and his PCP DR Maribel Mora. He followed with  Kwaku Loera CNP for heart failure but then stopped due to expense of co pay.   He says he is compliant with meds and diet.     H/O sleep apnea- BIPAP      ROS:  12 point ROS negative in all areas as listed below except as in [place]  Constitutional, EENT, , Musculoskeletal, skin, neurological, hematological, endocrine, Psychiatric    Past Medical History:   Diagnosis Date    Anemia     Angina     Arthritis     CAD (coronary artery disease)     CHF (congestive heart failure) (HCC)     CKD (chronic kidney disease) stage 3, GFR 30-59 ml/min ([facility])     Clostridium difficile diarrhea 3/16/12; 2/29/12    positive stool toxin    Diabetes mellitus ([address]     Diabetic neuropathy (HCC)     feet and legs    Disease of blood and blood forming organ     Dizziness     When he moves his head/ loses his balance    GERD (gastroesophageal reflux disease)     gastric ulcer    Headache     Hearing loss     Hyperlipidemia     Hypertension     Kidney stone 2002    Refusal of blood transfusions as patient is Anabaptist     Sleep apnea     Tinnitus     Venous ulcer ([address]     LLE    Wound, open 1/13/2012 Past Surgical History:   Procedure Laterality Date   [facility] CARDIAC SURGERY      Dec 27 2010, triple bypass    CHOLECYSTECTOMY  9/2011    HERNIA REPAIR  age1    OTHER SURGICAL HISTORY  11-16-11 REPAIR LOWER STERNAL INCISION, REMOVAL OF ONE STERNAL WIRE,    OTHER SURGICAL HISTORY  10/10/2014    phacoemulsification of cataract with intraocular lens implant left eye    UPPER GASTROINTESTINAL ENDOSCOPY         Objective:   /65   Pulse 83   Temp 97.7 °F (36.5 °C) (Oral)   Resp 18   Ht 5' 9\" (1.753 m)   Wt (!) 311 lb (141.1 kg)   SpO2 94%   BMI 45.93 kg/m²       Intake/Output Summary (Last 24 hours) at 8/25/2021 0949  Last data filed at 8/25/2021 0810  Gross per 24 hour   Intake 480 ml   Output 3275 ml   Net -2795 ml       TELEMETRY:  Slightly irreg ? Flutter or fib  Physical Exam:  Morbidly obese  General: No Respiratory distress, appears well developed and well nourished. Eyes:  Sclera nonicteric  Nose/Sinuses:  negative findings: nose shows no deformity, asymmetry, or inflammation, nasal mucosa normal, septum midline with no perforation or bleeding  Back:  no pain to palpation  Joint:  no active joint inflammation  Musculoskeletal:  negative  Skin:  Warm and dry  Neck:  Negative for JVD and Carotid Bruits. Chest:  Clear to auscultation, respiration easy  Cardiovascular:  RRR, S1S2 normal, 2/6 JEMIMA  At base, no rub or thrill. Abdomen:   Morbidly obese  Extremities:   No edema, clubbing, cyanosis,  Pulses: legs are wrapped   Neuro: intact    Medications:    sodium chloride flush  5-40 mL IntraVENous 2 times per day    heparin (porcine)  5,000 Units Subcutaneous 3 times per day    furosemide  40 mg IntraVENous BID    allopurinol  300 mg Oral Daily    aspirin  81 mg Oral Daily    carvedilol  25 mg Oral BID WC    colchicine  0.6 mg Oral Daily    ferrous sulfate  325 mg Oral TID WC    isosorbide mononitrate  30 mg Oral Daily    oxyCODONE-acetaminophen  1 tablet Oral 4x Daily    polyethylene on this study    The right ventricle is moderately enlarged.    Right ventricular systolic function is moderately reduced .    Mild to moderate tricuspid regurgitation.    Systolic pulmonic artery pressure (SPAP) is estimated at 65 mmHg consistent    with severe pulmonary hypertension (Right atrial pressure of 15 mmHg),    however difficult to accurately measure pressures on this study.    The right atrium is moderately dilated.       Assessment:  1. CHF acute diastolic   2. Corpulmonale chronic, severe pulm hypertension  3. HENNA  4.  Aortic stenosis moderate   Patient Active Problem List    Diagnosis Date Noted    Chest pain 08/23/2021    Mixed conductive and sensorineural hearing loss of left ear with restricted hearing of right ear 10/15/2020    Tympanic membrane perforation, left 10/15/2020    Lumbar facet arthropathy 02/11/2019    Disc displacement, lumbar 02/11/2019    Spinal stenosis of lumbar region 02/11/2019    Degeneration of lumbar or lumbosacral intervertebral disc 09/24/2018    Displacement of lumbar intervertebral disc without myelopathy 09/24/2018    Lumbosacral spondylosis without myelopathy 09/24/2018    Acute diastolic congestive heart failure ([address] 04/24/2018    Severe obstructive sleep apnea 04/03/2018    Obesity, Class III, BMI 40-49.9 (morbid obesity) ([address] 04/03/2018    Chronic pain 09/13/2017    Productive cough     Cardiomyopathy ([address] 10/19/2015    HLD (hyperlipidemia) 06/24/2015    Morbid obesity with BMI of 50.0-59.9, adult ([address]     DM2 (diabetes mellitus, type 2) ([address] 04/14/2015    Pulmonary hypertension due to left ventricular diastolic dysfunction ([address] 01/07/2013    Morbid obesity due to excess calories ([address] 01/07/2013    S/P CABG x 3 01/07/2013    Chronic diastolic heart failure ([address] 01/03/2013    Hyperkalemia 09/01/2012    Essential hypertension 02/06/2012    Anemia of chronic disease 02/04/2012    DM (diabetes mellitus) ([address] 01/11/2012    Coronary artery disease involving native coronary artery of native heart without angina pectoris 01/11/2012       Plan:  1. Continue diuresis  2. He had an episode of low BP yesterday so I am reducing his coreg  3. He has irreg heart rhythm on monitor I ordered a repeat EKG  4. No specific RX at this time for aortic stenosis lydia diaz recommended  5. Since he is on aldactone I reduced his potassium supplement to have may not need it at all.   6. CPAP with Rupesh Howell MD, MD 8/25/2021 9:49 AM

## 2021-08-25 NOTE — PROGRESS NOTES
Hospitalist Progress Note      PCP: Sasha Moeller MD    Date of Admission: 8/23/2021    Chief Complaint: Chest pain     Hospital Course:   78 y.o. male who presented to [facility] with above complaints. Pt with PMH of CAD s/p CABG, diastolic CHF, CKD, HTN, DM 2, HLD, sleep apnea, pulmonary hypertension presented to the ED today with complaints of chest pain. Pt reports retrosternal chest pain, burning in nature, feels like GERD, took PPI without much relief. Pain started few hours prior to arrival. Very minimal shortness of breath associated with this. Denies any nausea vomiting or diaphoresis. No cough. No subjective fevers chills or rigors. No abdominal pain. Pt reportedly took 4 baby aspirin prior to arrival. Did not take any NTG. Pt wears 4 L oxygen at home at baseline. Denied any palpitations or dizziness. He is a prior smoker, but currently denies any active smoking. He reports he has chronic leg wounds that are wrapped in Ace wrap, recently got it checked on Friday. Some mild chronic leg swelling. Satish Chavez reports he sleeps in a recliner all the time. Pt reports he is weight has been increasing the past few weeks, he reports his nephrologist put him back on torsemide and metolazone which she has been taking. He reports he usually weighs 285 pounds, currently at 297 pounds. Subjective:   Pt is on 3 LPM. Afebrile. VSS. No dyspnea at rest. No chest pain. Diuresed 2.2 liters yesterday and 1.9 liters thus far today. Weight 311 lbs today.      Medications:  Reviewed    Infusion Medications    sodium chloride      dextrose       Scheduled Medications    carvedilol  12.5 mg Oral BID     [START ON 8/26/2021] potassium chloride  20 mEq Oral Daily    sodium chloride flush  5-40 mL IntraVENous 2 times per day    heparin (porcine)  5,000 Units Subcutaneous 3 times per day    furosemide  40 mg IntraVENous BID    allopurinol  300 mg Oral Daily    aspirin  81 mg Oral Daily    colchicine  0.6 mg Oral Daily  ferrous sulfate  325 mg Oral TID     isosorbide mononitrate  30 mg Oral Daily    oxyCODONE-acetaminophen  1 tablet Oral 4x Daily    polyethylene glycol  17 g Oral Daily    pregabalin  50 mg Oral Dinner    senna  1 tablet Oral BID    metOLazone  2.5 mg Oral Daily    insulin glargine  0.25 Units/kg Subcutaneous Nightly    atorvastatin  40 mg Oral Nightly    spironolactone  25 mg Oral Daily    insulin lispro  0-18 Units Subcutaneous TID     insulin lispro  0-9 Units Subcutaneous Nightly    gelocast unnas boot  1 each Other Once    gelocast unnas boot  1 each Other Once     PRN Meds: sodium chloride flush, sodium chloride, ondansetron **OR** ondansetron, polyethylene glycol, acetaminophen **OR** acetaminophen, perflutren lipid microspheres, nitroGLYCERIN, morphine, glucose, dextrose, glucagon (rDNA), dextrose      Intake/Output Summary (Last 24 hours) at 8/25/2021 1455  Last data filed at 8/25/2021 1135  Gross per 24 hour   Intake 480 ml   Output 2975 ml   Net -2495 ml       Physical Exam Performed:    /65   Pulse 83   Temp 97.7 °F (36.5 °C) (Oral)   Resp 18   Ht 5' 9\" (1.753 m)   Wt (!) 311 lb (141.1 kg)   SpO2 94%   BMI 45.93 kg/m²     General appearance: Obese male in no apparent distress, appears stated age and cooperative. HEENT: Pupils equal, round, and reactive to light. Conjunctivae/corneas clear. Neck: Supple, with full range of motion. No jugular venous distention. Trachea midline. Respiratory:  Normal respiratory effort. Clear to auscultation, bilaterally without Rales/Wheezes/Rhonchi. Cardiovascular: Regular rate and rhythm with normal S1/S2 without murmurs, rubs or gallops. Abdomen: Soft, non-tender, non-distended with normal bowel sounds. Musculoskeletal: No clubbing, cyanosis. ++BLE edema. Skin: Skin color, texture, turgor normal.  No rashes or lesions. Neurologic:  Neurovascularly intact without any focal sensory/motor deficits.  Cranial nerves: II-XII intact, AS, RVSF mod reduced, mild-mod TR, severe PHTN. - Defer diuresis to Cardiology. Currently on lasix 40 mg IV BID, aldactone and metolazone. - Strict I's/O, daily weights and daily BMP with diuresis. Acute on chronic hypoxic respiratory failure:  - Secondary to above. - Wean supplemental O2 as tolerated. He wear 3-4 LPM at night. Currently requiring 3 LPM during the day.      Chest pain w/ elevated troponin in pt with CAD s/p CABG x3  - Troponin 0.04 --> 0.03. Likely due to CHF.    - Last cath in 2012 showed widely patent grafts. - Cardiology consulted. - Monitor pt on telemetry. - Continue aspirin, statin, carvedilol, imdur. HTN:  - Controlled, continue home medication regimen.     CKD stage 3:  - Stable, monitor. Followed by Dr. Tremaine Smith outpatient.     DM2:  - Unontrolled, A1c 8.5  - Continue basal bolus insulin -- monitor and adjust as needed. - Carb-control diet.      HLD:  - Continue statin. Severe HENNA:  -Continue home BiPAP.     Morbid obesity:  - With Body mass index is 45.93 kg/m². Complicating assessment and treatment. Placing patient at risk for multiple co-morbidities as well as early death and contributing to the patient's presentation. Counseled on weight loss.        DVT Prophylaxis: SQ heparin   Diet: ADULT DIET; Regular; 5 carb choices (75 gm/meal);  Low Sodium (2 gm); 1800 ml  Code Status: Full Code    PT/OT Eval Status: not ordered     Dispo - 2-4 days pending clinical course     Haley Brewer, APRN - CNP

## 2021-08-25 NOTE — PROGRESS NOTES
Physical Therapy    Facility/Department: [facility] C5 - MED SURG/ORTHO  Initial Assessment and Treatment    NAME: Aiden Jameson  : 1955  MRN: [id number]    Date of Service: 2021    Discharge Recommendations:  Home with assist PRN, Home with Home health PT   PT Equipment Recommendations  Equipment Needed: No  Other: pt owns 4WW    Assessment   Body structures, Functions, Activity limitations: Decreased functional mobility   Assessment: Pt is a 77 y/o male presenting to [facility] with chest pain. PTA, pt was IND with 9OG and lives in an apartment alone. Pt currently is Mod I with all mobility with a SW in the room. Pt completed a curb step with supervision initially and progressing to Mod I. Pt's SpO2 dropped to 85% during session and requires cues for PLB and pacing. Pt appears to be close to his baseline and is Mod I for all observed mobility and acute PT will sign off. If pt's status changes, please reach out. Recommend home with PRN assist and Home PT. Treatment Diagnosis: Decreased functional mobility  Prognosis: Good  Decision Making: Low Complexity  PT Education: Goals;PT Role;Plan of Care;Home Exercise Program;Gait Training  Patient Education: Pt verbalized understanding for all education and demos decreased carryover. Pt may benefit from reinforcement for pacing and activity tolerance. No Skilled PT: At baseline function  REQUIRES PT FOLLOW UP: No  Activity Tolerance  Activity Tolerance: Patient Tolerated treatment well  Activity Tolerance: Moderate fatigue with SpO2 dropping to 85% at the end of session, pt denies fatigue or SOB. Pt encouraged for PLB and remaining quite with poor carryover. Patient Diagnosis(es): The primary encounter diagnosis was Chest pain, unspecified type. Diagnoses of Acute on chronic congestive heart failure, unspecified heart failure type (HCC) and Elevated troponin were also pertinent to this visit.      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 has a past surgical history that includes hernia repair (age2); Cholecystectomy (9/2011); other surgical history (11-16-11 REPAIR LOWER STERNAL INCISION, REMOVAL OF ONE STERNAL WIRE,); Upper gastrointestinal endoscopy; Cardiac surgery; and other surgical history (10/10/2014). 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 Pt has a PMH of CAD s/p CABG, diastolic CHF, CKD, HTN, DM 2, HLD, sleep apnea, pulmonary hypertension  Response To Previous Treatment: Not applicable  Family / Caregiver Present: No  Referring Practitioner: PATRICK Mcelroy CNP  Referral Date : 08/25/21  Subjective  Subjective: Pt agreeable to therapy, very talkative.   Pain Screening  Patient Currently in Pain: Yes  Pain Assessment  Pain Assessment: 0-10  Pain Level: 6  Pain Type: Acute pain  Pain Location: Leg  Pain Orientation: Right;Left  Non-Pharmaceutical Pain Intervention(s): Ambulation/Increased Activity;Repositioned  Vital Signs  Patient Currently in Pain: Yes       Orientation  Orientation  Overall Orientation Status: Within Normal Limits  Social/Functional History  Social/Functional History  Lives With: Alone  Type of Home: Apartment  Home Layout: One level  Home Access: Stairs to enter with rails  Entrance Stairs - Number of Steps: 2  Entrance Stairs - Rails: Both  Bathroom Shower/Tub: Tub/Shower unit  Bathroom Toilet: Standard  Bathroom Equipment: Grab bars in shower  Home Equipment: 4 wheeled walker, Cane, Oxygen (3-4LO2 at night)  Receives Help From: Home health ([address] and [address] and RN for wound care)  ADL Assistance: [address] [facility]: Needs assistance (Pt can use microwave for meals. Has help from Mj Motley for cleaning and laundry every 2 weeks)  Ambulation Assistance: Independent (Uses 4WW in apt and then cane to walk outside)  Transfer Assistance: Independent  Active : No  Additional Comments: No falls in past 6 months  Cognition   Cognition  Overall Cognitive Status: WFL  Cognition Comment: Cues to redirect at times d/t verbose. Objective          AROM RLE (degrees)  RLE AROM: WFL  AROM LLE (degrees)  LLE AROM : WFL  Strength RLE  Strength RLE: WFL  Comment: per observed mobility, no overpressure d/t bilateral wraps for wounds  Strength LLE  Strength LLE: WFL  Comment: per observed mobility, no overpressure d/t bilateral wraps for wounds  Tone RLE  RLE Tone: Normotonic  Tone LLE  LLE Tone: Normotonic  Sensation  Overall Sensation Status: Impaired (BLE neuropathy from feet to knees bilaterally; equal on both sides with no intact sensation per pt with assessment.)  Bed mobility  Supine to Sit: Modified independent (with HOB elevated and bed rail used)  Sit to Supine: Modified independent (with bed rail used, and HOB flat)  Transfers  Sit to Stand: Modified independent  Stand to sit: Modified independent  Bed to Chair: Modified independent  Comment:  Mod I to SW; slow to complete  Ambulation  Ambulation?: Yes  More Ambulation?: No  Ambulation 1  Surface: level tile  Device: Standard Walker  Assistance: Modified Independent  Quality of Gait: Slow to complete, wide CHAPARRITA, no LOB  Gait Deviations: Slow Chrissy; Increased CHAPARRITA  Distance: 50 feet  Comments: No SOB observed or reported from the pt. Stairs/Curb  Stairs?: Yes  Stairs  # Steps : 2  Stairs Height: 6\"  Rails: None  Device: Standard walker  Assistance: Modified independent ;Supervision  Comment: Supervision initially for curb step with cues for safety, pt able to complete 2nd time with Mod I and no LOB. Pt reports this is his typical navigation for steps to enter apartment. Balance  Posture: Fair  Sitting - Static: Good  Sitting - Dynamic: Good  Standing - Static: Good  Standing - Dynamic: Good;-  Comments: with SW  Exercises  Comments: Pt reports he has ther ex he completes at home each day from prior therapies. Plan   Plan  Times per week: 1x only  Safety Devices  Type of devices: Left in bed, Call light within reach, Nurse notified (Pt sitting on the EOB without alarm)    AM-PAC Score  AM-PAC Inpatient Mobility Raw Score : 23 (08/25/21 1808)  AM-PAC Inpatient T-Scale Score : 56.93 (08/25/21 1808)  Mobility Inpatient CMS 0-100% Score: 11.2 (08/25/21 1808)  Mobility Inpatient CMS G-Code Modifier : CI (08/25/21 1808)        Goals  Short term goals  Time Frame for Short term goals: 1x only, all goals met. Short term goal 1: Pt will complete curb step with Mod I and LRAD. GOAL MET with SW.  Short term goal 2: Pt will ambulate 50 feet with LRAD and Mod I. GOAL MET with SW  Patient Goals   Patient goals : To go home. Therapy Time   Individual Concurrent Group Co-treatment   Time In 1634         Time Out 1707         Minutes 33         Timed Code Treatment Minutes: 23 Minutes (10 minute eval)  This will serve as the discharge summary.      Kathia Rausch, PT

## 2021-08-26 LAB
ANION GAP SERPL CALCULATED.3IONS-SCNC: 10 MMOL/L (ref 3–16)
BUN BLDV-MCNC: 46 MG/DL (ref 7–20)
CALCIUM SERPL-MCNC: 9.6 MG/DL (ref 8.3–10.6)
CHLORIDE BLD-SCNC: 97 MMOL/L (ref 99–110)
CO2: 32 MMOL/L (ref 21–32)
CREAT SERPL-MCNC: 1.3 MG/DL (ref 0.8–1.3)
EKG ATRIAL RATE: 220 BPM
EKG ATRIAL RATE: 241 BPM
EKG DIAGNOSIS: NORMAL
EKG DIAGNOSIS: NORMAL
EKG P AXIS: 252 DEGREES
EKG Q-T INTERVAL: 378 MS
EKG Q-T INTERVAL: 392 MS
EKG QRS DURATION: 84 MS
EKG QRS DURATION: 86 MS
EKG QTC CALCULATION (BAZETT): 435 MS
EKG QTC CALCULATION (BAZETT): 452 MS
EKG R AXIS: -12 DEGREES
EKG R AXIS: -28 DEGREES
EKG T AXIS: 115 DEGREES
EKG T AXIS: 117 DEGREES
EKG VENTRICULAR RATE: 80 BPM
EKG VENTRICULAR RATE: 80 BPM
GFR AFRICAN AMERICAN: >60
GFR NON-AFRICAN AMERICAN: 55
GLUCOSE BLD-MCNC: 166 MG/DL (ref 70–99)
GLUCOSE BLD-MCNC: 217 MG/DL (ref 70–99)
GLUCOSE BLD-MCNC: 231 MG/DL (ref 70–99)
GLUCOSE BLD-MCNC: 247 MG/DL (ref 70–99)
GLUCOSE BLD-MCNC: 338 MG/DL (ref 70–99)
MAGNESIUM: 2.3 MG/DL (ref 1.8–2.4)
PERFORMED ON: ABNORMAL
POTASSIUM SERPL-SCNC: 4.3 MMOL/L (ref 3.5–5.1)
PRO-BNP: 1593 PG/ML (ref 0–124)
SODIUM BLD-SCNC: 139 MMOL/L (ref 136–145)

## 2021-08-26 PROCEDURE — 2700000000 HC OXYGEN THERAPY PER DAY

## 2021-08-26 PROCEDURE — 6360000002 HC RX W HCPCS: Performed by: INTERNAL MEDICINE

## 2021-08-26 PROCEDURE — 83735 ASSAY OF MAGNESIUM: CPT

## 2021-08-26 PROCEDURE — 93005 ELECTROCARDIOGRAM TRACING: CPT | Performed by: NURSE PRACTITIONER

## 2021-08-26 PROCEDURE — 93005 ELECTROCARDIOGRAM TRACING: CPT | Performed by: INTERNAL MEDICINE

## 2021-08-26 PROCEDURE — 6370000000 HC RX 637 (ALT 250 FOR IP): Performed by: INTERNAL MEDICINE

## 2021-08-26 PROCEDURE — 94761 N-INVAS EAR/PLS OXIMETRY MLT: CPT

## 2021-08-26 PROCEDURE — 93010 ELECTROCARDIOGRAM REPORT: CPT | Performed by: INTERNAL MEDICINE

## 2021-08-26 PROCEDURE — 36415 COLL VENOUS BLD VENIPUNCTURE: CPT

## 2021-08-26 PROCEDURE — 1200000000 HC SEMI PRIVATE

## 2021-08-26 PROCEDURE — 99233 SBSQ HOSP IP/OBS HIGH 50: CPT | Performed by: NURSE PRACTITIONER

## 2021-08-26 PROCEDURE — 99223 1ST HOSP IP/OBS HIGH 75: CPT | Performed by: INTERNAL MEDICINE

## 2021-08-26 PROCEDURE — 2580000003 HC RX 258: Performed by: INTERNAL MEDICINE

## 2021-08-26 PROCEDURE — 80048 BASIC METABOLIC PNL TOTAL CA: CPT

## 2021-08-26 PROCEDURE — 83880 ASSAY OF NATRIURETIC PEPTIDE: CPT

## 2021-08-26 PROCEDURE — 6370000000 HC RX 637 (ALT 250 FOR IP): Performed by: REGISTERED NURSE

## 2021-08-26 RX ORDER — ADENOSINE 3 MG/ML
12 INJECTION, SOLUTION INTRAVENOUS ONCE
Status: COMPLETED | OUTPATIENT
Start: 2021-08-26 | End: 2021-08-26

## 2021-08-26 RX ORDER — ADENOSINE 3 MG/ML
18 INJECTION, SOLUTION INTRAVENOUS ONCE
Status: COMPLETED | OUTPATIENT
Start: 2021-08-26 | End: 2021-08-26

## 2021-08-26 RX ADMIN — HEPARIN SODIUM 5000 UNITS: 5000 INJECTION INTRAVENOUS; SUBCUTANEOUS at 15:55

## 2021-08-26 RX ADMIN — FUROSEMIDE 40 MG: 10 INJECTION, SOLUTION INTRAMUSCULAR; INTRAVENOUS at 17:59

## 2021-08-26 RX ADMIN — FUROSEMIDE 40 MG: 10 INJECTION, SOLUTION INTRAMUSCULAR; INTRAVENOUS at 08:54

## 2021-08-26 RX ADMIN — COLCHICINE 0.6 MG: 0.6 TABLET ORAL at 08:53

## 2021-08-26 RX ADMIN — SODIUM CHLORIDE, PRESERVATIVE FREE 10 ML: 5 INJECTION INTRAVENOUS at 22:33

## 2021-08-26 RX ADMIN — ADENOSINE 12 MG: 3 INJECTION, SOLUTION INTRAVENOUS at 17:38

## 2021-08-26 RX ADMIN — ATORVASTATIN CALCIUM 40 MG: 40 TABLET, FILM COATED ORAL at 22:22

## 2021-08-26 RX ADMIN — SODIUM CHLORIDE, PRESERVATIVE FREE 10 ML: 5 INJECTION INTRAVENOUS at 08:52

## 2021-08-26 RX ADMIN — ALLOPURINOL 300 MG: 300 TABLET ORAL at 08:52

## 2021-08-26 RX ADMIN — POTASSIUM CHLORIDE 20 MEQ: 20 TABLET, EXTENDED RELEASE ORAL at 08:52

## 2021-08-26 RX ADMIN — FERROUS SULFATE TAB 325 MG (65 MG ELEMENTAL FE) 325 MG: 325 (65 FE) TAB at 08:53

## 2021-08-26 RX ADMIN — SENNOSIDES 8.6 MG: 8.6 TABLET, FILM COATED ORAL at 08:52

## 2021-08-26 RX ADMIN — INSULIN LISPRO 6 UNITS: 100 INJECTION, SOLUTION INTRAVENOUS; SUBCUTANEOUS at 08:56

## 2021-08-26 RX ADMIN — SENNOSIDES 8.6 MG: 8.6 TABLET, FILM COATED ORAL at 22:22

## 2021-08-26 RX ADMIN — ADENOSINE 18 MG: 3 INJECTION, SOLUTION INTRAVENOUS at 17:48

## 2021-08-26 RX ADMIN — FERROUS SULFATE TAB 325 MG (65 MG ELEMENTAL FE) 325 MG: 325 (65 FE) TAB at 15:48

## 2021-08-26 RX ADMIN — OXYCODONE AND ACETAMINOPHEN 1 TABLET: 5; 325 TABLET ORAL at 08:54

## 2021-08-26 RX ADMIN — POLYETHYLENE GLYCOL 3350 17 G: 17 POWDER, FOR SOLUTION ORAL at 08:53

## 2021-08-26 RX ADMIN — METOLAZONE 2.5 MG: 2.5 TABLET ORAL at 08:52

## 2021-08-26 RX ADMIN — ISOSORBIDE MONONITRATE 30 MG: 30 TABLET, EXTENDED RELEASE ORAL at 08:58

## 2021-08-26 RX ADMIN — OXYCODONE AND ACETAMINOPHEN 1 TABLET: 5; 325 TABLET ORAL at 15:48

## 2021-08-26 RX ADMIN — INSULIN LISPRO 3 UNITS: 100 INJECTION, SOLUTION INTRAVENOUS; SUBCUTANEOUS at 22:23

## 2021-08-26 RX ADMIN — INSULIN LISPRO 12 UNITS: 100 INJECTION, SOLUTION INTRAVENOUS; SUBCUTANEOUS at 12:25

## 2021-08-26 RX ADMIN — CARVEDILOL 12.5 MG: 6.25 TABLET, FILM COATED ORAL at 15:51

## 2021-08-26 RX ADMIN — INSULIN LISPRO 3 UNITS: 100 INJECTION, SOLUTION INTRAVENOUS; SUBCUTANEOUS at 17:59

## 2021-08-26 RX ADMIN — OXYCODONE AND ACETAMINOPHEN 1 TABLET: 5; 325 TABLET ORAL at 22:22

## 2021-08-26 RX ADMIN — INSULIN LISPRO 5 UNITS: 100 INJECTION, SOLUTION INTRAVENOUS; SUBCUTANEOUS at 08:55

## 2021-08-26 RX ADMIN — FERROUS SULFATE TAB 325 MG (65 MG ELEMENTAL FE) 325 MG: 325 (65 FE) TAB at 22:39

## 2021-08-26 RX ADMIN — CARVEDILOL 12.5 MG: 6.25 TABLET, FILM COATED ORAL at 08:53

## 2021-08-26 RX ADMIN — INSULIN LISPRO 10 UNITS: 100 INJECTION, SOLUTION INTRAVENOUS; SUBCUTANEOUS at 12:24

## 2021-08-26 RX ADMIN — INSULIN LISPRO 10 UNITS: 100 INJECTION, SOLUTION INTRAVENOUS; SUBCUTANEOUS at 17:58

## 2021-08-26 RX ADMIN — HEPARIN SODIUM 5000 UNITS: 5000 INJECTION INTRAVENOUS; SUBCUTANEOUS at 22:22

## 2021-08-26 RX ADMIN — HEPARIN SODIUM 5000 UNITS: 5000 INJECTION INTRAVENOUS; SUBCUTANEOUS at 06:37

## 2021-08-26 RX ADMIN — SPIRONOLACTONE 25 MG: 25 TABLET, FILM COATED ORAL at 08:53

## 2021-08-26 RX ADMIN — PREGABALIN 50 MG: 25 CAPSULE ORAL at 15:47

## 2021-08-26 RX ADMIN — ASPIRIN 81 MG 81 MG: 81 TABLET ORAL at 08:53

## 2021-08-26 ASSESSMENT — PAIN SCALES - GENERAL
PAINLEVEL_OUTOF10: 7
PAINLEVEL_OUTOF10: 5

## 2021-08-26 NOTE — DISCHARGE INSTR - COC
Continuity of Care Form    Patient Name: Yunior Martinez   :  1955  MRN:  [id number]    [address] [place] [address] date:  2021  Discharge date:  2021    Code Status Order: Full Code   Advance Directives:      Admitting Physician:  Micheal Knight MD  PCP: Shweta Garcia MD    Discharging Nurse: [facility] Unit/Room#: 4157/2449-50  Discharging Unit Phone Number: [phone]    Emergency Contact:   Extended Emergency Contact Information  Primary Emergency Contact: [address] of [address] [place] [address] Phone: [phone]  Mobile Phone: [phone]  Relation: Other  Secondary Emergency Contact: [facility] of [address] [place] [address] Phone: [phone]  Relation: Other    Past Surgical History:  Past Surgical History:   Procedure Laterality Date   Everevleioe Chaparrita CARDIAC SURGERY      Dec 27 2010, triple bypass    CHOLECYSTECTOMY  2011    HERNIA REPAIR  age2    OTHER SURGICAL HISTORY  11 REPAIR LOWER STERNAL INCISION, REMOVAL OF ONE STERNAL WIRE,    OTHER SURGICAL HISTORY  10/10/2014    phacoemulsification of cataract with intraocular lens implant left eye    UPPER GASTROINTESTINAL ENDOSCOPY         Immunization History:   Immunization History   Administered Date(s) Administered    COVID-19, Eloy Marquis PF, 30mcg/0.3mL 2021, 2021    Influenza Virus Vaccine 2012, 10/02/2017, 10/01/2018, 2019, 2020    Influenza, High Dose (Fluzone 65 yrs and older) 10/01/2019    Influenza, MDCK Quadv, IM, PF (Flucelvax 4 yrs and older) 10/05/2018    Influenza, Quadv, IM, PF (6 mo and older Fluzone, Flulaval, Fluarix, and 3 yrs and older Afluria) 2016, 10/02/2017, 2020    Influenza, Quadv, adjuvanted, 65 yrs +, IM, PF (Fluad) 2020    Influenza, Triv, inactivated, subunit, adjuvanted, IM (Fluad 65 yrs and older) 2019    Pneumococcal Conjugate 13-valent ([id number]) 2019    Pneumococcal Polysaccharide ([id number]) 2011, 11/27/2014, 12/17/2020       Active Problems:  Patient Active Problem List   Diagnosis Code    DM (diabetes mellitus) ([facility] E11.9    Coronary artery disease involving native coronary artery of native heart without angina pectoris I25.10    Anemia of chronic disease D63.8    Essential hypertension I10    Hyperkalemia E87.5    Chronic diastolic heart failure ([facility]) I50.32    Pulmonary hypertension due to left ventricular diastolic dysfunction ([facility]) I27.22    Morbid obesity due to excess calories ([facility]) E66.01    S/P CABG x 3 Z95.1    DM2 (diabetes mellitus, type 2) ([facility]) E11.9    Morbid obesity with BMI of 50.0-59.9, adult ([facility]) E66.01, Z68.43    HLD (hyperlipidemia) E78.5    Cardiomyopathy ([facility]) I42.9    Productive cough R05    Chronic pain G89.29    Severe obstructive sleep apnea G47.33    Obesity, Class III, BMI 40-49.9 (morbid obesity) ([facility]) E66.01    Acute diastolic congestive heart failure ([facility]) I50.31    Degeneration of lumbar or lumbosacral intervertebral disc M51.37    Displacement of lumbar intervertebral disc without myelopathy M51.26    Lumbosacral spondylosis without myelopathy M47.817    Lumbar facet arthropathy M47.816    Disc displacement, lumbar M51.26    Spinal stenosis of lumbar region M48.061    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    Tympanic membrane perforation, left H72.92    Chest pain R07.9    Cor pulmonale, chronic ([facility]) I27.81    Pulmonary hypertension due to alveolar hypoventilation disorder ([facility]) I27.23    Nonrheumatic aortic valve stenosis I35.0       Isolation/Infection:   Isolation            No Isolation          Patient Infection Status       Infection Onset Added Last Indicated Last Indicated By Review Planned Expiration Resolved Resolved By    None active    Resolved    COVID-19 Rule Out 08/23/21 08/23/21 08/23/21 COVID-19, Rapid (Ordered)   08/23/21 Rule-Out Test Resulted Nurse Assessment:  Last Vital Signs: /62   Pulse 78   Temp 97.4 °F (36.3 °C) (Oral)   Resp 20   Ht 5' 9\" (1.753 m)   Wt (!) 308 lb 8 oz (139.9 kg)   SpO2 92%   BMI 45.56 kg/m²     Last documented pain score (0-10 scale): Pain Level: 7  Last Weight:   Wt Readings from Last 1 Encounters:   08/26/21 (!) 308 lb 8 oz (139.9 kg)     Mental Status:  oriented and alert    IV Access:  - None    Nursing Mobility/ADLs:  Walking   Independent  Transfer  Independent  Bathing  Independent  Dressing  Independent  Toileting  Independent  Feeding  Independent  Med Admin  Independent  Med Delivery   none and whole    Wound Care Documentation and Therapy:  Wound 08/24/21 Leg Left; Lower (Active)   Wound Image   08/24/21 1542   Wound Etiology Venous 08/25/21 1634   Dressing Status New drainage noted;New dressing applied 08/25/21 1634   Wound Cleansed Wound cleanser;Cleansed with saline 08/25/21 1634   Dressing/Treatment Alginate with Ag;Calmoseptine/zinc oxide with menthol;Coban/self-adherent bandages 08/25/21 1634   Dressing Change Due 09/01/21 08/26/21 0830   Wound Length (cm) 2 cm 08/24/21 1542   Wound Width (cm) 2 cm 08/24/21 1542   Wound Depth (cm) 0.1 cm 08/24/21 1542   Wound Surface Area (cm^2) 4 cm^2 08/24/21 1542   Wound Volume (cm^3) 0.4 cm^3 08/24/21 1542   Distance Tunneling (cm) 0 cm 08/25/21 1634   Tunneling Position ___ O'Clock 0 08/25/21 1634   Undermining Starts ___ O'Clock 0 08/25/21 1634   Undermining Ends___ O'Clock 0 08/25/21 1634   Wound Assessment Pink/red 08/25/21 1634   Drainage Amount Small 08/25/21 1634   Drainage Description Purulent; Thin 08/25/21 1634   Odor None 08/25/21 1634   Radha-wound Assessment Hemosiderin staining (brown yellow) 08/25/21 1634   Margins Defined edges 08/25/21 1634   Wound Thickness Description not for Pressure Injury Partial thickness 08/25/21 0724   Number of days: 2       Wound 08/24/21 Leg Right; Lower (Active)   Wound Image   08/24/21 1542   Wound Etiology Venous 08/26/21 0830   Dressing Status New drainage noted;New dressing applied 08/26/21 0830   Wound Cleansed Cleansed with saline 08/26/21 0830   Dressing/Treatment Alginate with Ag;Dry dressing;Roll gauze; Ace wrap 08/26/21 0830   Dressing Change Due 08/25/21 08/26/21 0830   Wound Length (cm) 0.5 cm 08/24/21 1542   Wound Width (cm) 0.5 cm 08/24/21 1542   Wound Depth (cm) 0.1 cm 08/24/21 1542   Wound Surface Area (cm^2) 0.25 cm^2 08/24/21 1542   Wound Volume (cm^3) 0.025 cm^3 08/24/21 1542   Distance Tunneling (cm) 0 cm 08/25/21 1634   Tunneling Position ___ O'Clock 0 08/25/21 1634   Undermining Starts ___ O'Clock 0 08/25/21 1634   Undermining Ends___ O'Clock 0 08/25/21 1634   Undermining Maxium Distance (cm) 0 08/25/21 1634   Wound Assessment Bleeding;Pink/red 08/26/21 0830   Drainage Amount Small 08/26/21 0830   Drainage Description Sanguinous 08/26/21 0830   Odor None 08/26/21 0830   Radha-wound Assessment Hemosiderin staining (brown yellow) 08/26/21 0830   Margins Attached edges; Defined edges 08/26/21 0830   Wound Thickness Description not for Pressure Injury Partial thickness 08/25/21 1634   Number of days: 2        Elimination:  Continence:   · Bowel: No  · Bladder: No  Urinary Catheter: None   Colostomy/Ileostomy/Ileal Conduit: No       Date of Last BM: 8/31/21    Intake/Output Summary (Last 24 hours) at 8/26/2021 1554  Last data filed at 8/26/2021 1028  Gross per 24 hour   Intake 1020 ml   Output 2400 ml   Net -1380 ml     I/O last 3 completed shifts: In: 1020 [P.O.:1020]  Out: 2400 [Urine:2400]    Safety Concerns:      At Risk for Falls    Impairments/Disabilities:      None    Nutrition Therapy:  Current Nutrition Therapy:   - Oral Diet:  Carb Control 3 carbs/meal (1500kcals/day)    Routes of Feeding: Oral  Liquids: No Restrictions  Daily Fluid Restriction: no  Last Modified Barium Swallow with Video (Video Swallowing Test): not done    Treatments at the Time of Hospital Discharge:   Respiratory Treatments: ***  Oxygen Therapy:  is on oxygen at 2-4 L/min per nasal cannula. Ventilator:    - No ventilator support    Rehab Therapies: Physical Therapy and Occupational Therapy  Weight Bearing Status/Restrictions: No weight bearing restirctions  Other Medical Equipment (for information only, NOT a DME order):  cane  Other Treatments: ***    Patient's personal belongings (please select all that are sent with patient):  None    RN SIGNATURE:  Electronically signed by Cherry Stringer RN on 8/31/21 at 2:44 PM EDT    CASE MANAGEMENT/SOCIAL WORK SECTION    Inpatient Status Date: ***    Readmission Risk Assessment Score:  Readmission Risk              Risk of Unplanned Readmission:  22           Discharging to Facility/ [place] [id number]   [address] [place] [address]   [phone]       / signature: Electronically signed by Deann Perez RN on 8/31/21 at 3:01 PM EDT    PHYSICIAN SECTION    Prognosis: Good    Condition at Discharge: Stable    Rehab Potential (if transferring to Rehab): Good    Recommended Labs or Other Treatments After Discharge: None    Physician Certification: I certify the above information and transfer of Martha Robertson  is necessary for the continuing treatment of the diagnosis listed and that he requires [address] for less 30 days.      Update Admission H&P: No change in H&P    PHYSICIAN SIGNATURE:  Electronically signed by Eric Ospina MD on 8/31/21 at 12:50 PM EDT

## 2021-08-26 NOTE — FLOWSHEET NOTE
Adenosine 12mg IVP administered by Nuria Beltran Supervisor RN with Rusty Alonzo RN at bedside. Pt connected to life pack and continuous EKG. Dr. Pippa Verde at bedside. CMU aware. Pt had no response to Adensoine administration.

## 2021-08-26 NOTE — CARE COORDINATION
CM updated by Ti Vences NP pt likely discharge in three days as pt is still being diuresed admitted with CHF. PTA pt independent and active with [employer] for home O2. Met with pt at bedside and pt does have portable tank available when discharged. Pt also active with [facility] non-skilled [facility]. Therapy recs home PT/OT. Spoke to Kiley Hurley with [facility] and they have skilled available and will follow pt for SN, PT and OT once discharged. HERMAN gilliam-Leighann Torres RN     ADDENDUM 3052: HERMAN spoke to Sd Leyva with [facility] and pt is open with [facility]. Discussed pt getting front loaded and CHF HC Vitals at home.  HERMAN davison-Leighann Torres, RN

## 2021-08-26 NOTE — PROGRESS NOTES
[place]   Daily Progress Note    Admit Date:  8/23/2021  HPI:    Chief Complaint   Patient presents with    Chest Pain     pt to ED via EMS from home with chest pain. hx of CHF. denies SOB. took 4 baby aspirin PTA and denied nitro. wears 4 L O2 at home at baseline        Interval history: Kimmie Cruz is being followed for shorntess of breath, chest pain and CHF. Subjective:  Mr. Emerson Speed breathing is improved. Denies any chest pain. Main complaint is the padilla boot on the left leg digging into the back of his leg- placed per wound care, nursing is aware. Objective:   /64   Pulse 80   Temp 99 °F (37.2 °C) (Oral)   Resp 18   Ht 5' 9\" (1.753 m)   Wt (!) 308 lb 8 oz (139.9 kg)   SpO2 96%   BMI 45.56 kg/m²       Intake/Output Summary (Last 24 hours) at 8/26/2021 0849  Last data filed at 8/26/2021 0640  Gross per 24 hour   Intake 900 ml   Output 2700 ml   Net -1800 ml       NYHA: III    Physical Exam:  General:  Awake, alert, NAD, sitting on the side of the bed.    Skin:  Warm and dry  Neck:  JVD difficult to assess   Chest:  Dim to auscultation, no wheezes/rhonchi/ few rales in the bases  Telemetry: appears sinus at times, but poor data with baseline artifact   Cardiovascular:  RRR S1S2, no m/r/g   Abdomen:  Soft, nontender, +bowel sounds  Extremities:  ++ bLE bilateral lower extremity edema    Medications:    carvedilol  12.5 mg Oral BID WC    potassium chloride  20 mEq Oral Daily    insulin glargine  40 Units Subcutaneous Nightly    insulin lispro  5 Units Subcutaneous TID WC    sodium chloride flush  5-40 mL IntraVENous 2 times per day    heparin (porcine)  5,000 Units Subcutaneous 3 times per day    furosemide  40 mg IntraVENous BID    allopurinol  300 mg Oral Daily    aspirin  81 mg Oral Daily    colchicine  0.6 mg Oral Daily    ferrous sulfate  325 mg Oral TID WC    isosorbide mononitrate  30 mg Oral Daily    oxyCODONE-acetaminophen  1 tablet Oral 4x Daily    polyethylene glycol  17 g Oral Daily    pregabalin  50 mg Oral Dinner    senna  1 tablet Oral BID    metOLazone  2.5 mg Oral Daily    atorvastatin  40 mg Oral Nightly    spironolactone  25 mg Oral Daily    insulin lispro  0-18 Units Subcutaneous TID WC    insulin lispro  0-9 Units Subcutaneous Nightly      sodium chloride      dextrose         Lab Data:  CBC:   Recent Labs     08/23/21  1422   WBC 8.4   HGB 11.9*        BMP:    Recent Labs     08/24/21  0546 08/25/21  0627 08/26/21  0714    138 139   K 3.8 4.2 4.3   CO2 34* 30 32   BUN 50* 44* 46*   CREATININE 1.5* 1.3 1.3     INR:  No results for input(s): INR in the last 72 hours. BNP:    Recent Labs     08/23/21  1422 08/26/21  0714   PROBNP 1,446* 1,593*     Lab Results   Component Value Date    LVEF 58 04/26/2018       Testing:     echo  8/24/21   Technically difficult examination.    Normal left ventricular systolic function with ejection fraction of 55-60%.    Paradoxic septal motion    Mild concentric left ventricular hypertrophy.    Left ventricular diastolic filling pressure is elevated.    Compared to previous study from 4- no changes noted in left    ventricular function.    Mild mitral and pulmonic regurgitation.    Bi-atrial enlargement.    Moderate aortic stenosis, difficult to fully evaluate on this study    The right ventricle is moderately enlarged.    Right ventricular systolic function is moderately reduced .    Mild to moderate tricuspid regurgitation.    Systolic pulmonic artery pressure (SPAP) is estimated at 65 mmHg consistent    with severe pulmonary hypertension (Right atrial pressure of 15 mmHg),    however difficult to accurately measure pressures on this study.    The right atrium is moderately dilated.       08/26/21 0302  308 lb 8 oz (139.9 kg)  Standing scale     08/25/21 0612  311 lb (141.1 kg)  Standing scale     08/23/21 1412  295 lb (133.8 kg)  Stated       Active Problems:    DM (diabetes mellitus) [facility])    Coronary artery disease involving native coronary artery of native heart without angina pectoris    Essential hypertension    Morbid obesity due to excess calories (HCC)    S/P CABG x 3    HLD (hyperlipidemia)    Severe obstructive sleep apnea    Acute diastolic congestive heart failure (HCC)    Chest pain    Cor pulmonale, chronic ([address]    Pulmonary hypertension due to alveolar hypoventilation disorder (HCC)    Nonrheumatic aortic valve stenosis  Resolved Problems:    * No resolved hospital problems.  *    Assessment:  Acute on chronic diastolic heart failure  Acute on Chronic cor pulmonale  HENNA  Moderate aortic stenosis   Atrial arrhythmia   Obese  CAD s/p CABG  HTN  DM  HLD  CKD stage 3    Plan:  Continue daily weights, 1800ml fluid restriction  Continue lasix 40mg IV BID and Spironolactone (aldactone) 25mg daily and metolazone 2.5mg daily   Continue coreg 12.5mg BID  Asked EP to evaluate atrial arrhythmia- appreciate assistance       PATRICK Pineda - CNP, CNP, 8/26/2021, 1:18 PM

## 2021-08-26 NOTE — PROGRESS NOTES
Hospitalist Progress Note      PCP: Sruthi Ko MD    Date of Admission: 8/23/2021    Chief Complaint: Chest pain     Hospital Course:   78 y.o. male who presented to [facility] with above complaints. Pt with PMH of CAD s/p CABG, diastolic CHF, CKD, HTN, DM 2, HLD, sleep apnea, pulmonary hypertension presented to the ED today with complaints of chest pain. Pt reports retrosternal chest pain, burning in nature, feels like GERD, took PPI without much relief. Pain started few hours prior to arrival. Very minimal shortness of breath associated with this. Denies any nausea vomiting or diaphoresis. No cough. No subjective fevers chills or rigors. No abdominal pain. Pt reportedly took 4 baby aspirin prior to arrival. Did not take any NTG. Pt wears 4 L oxygen at home at baseline. Denied any palpitations or dizziness. He is a prior smoker, but currently denies any active smoking. He reports he has chronic leg wounds that are wrapped in Ace wrap, recently got it checked on Friday. Some mild chronic leg swelling. Elio Vietnamese reports he sleeps in a recliner all the time. Pt reports he is weight has been increasing the past few weeks, he reports his nephrologist put him back on torsemide and metolazone which she has been taking. He reports he usually weighs 285 pounds, currently at 297 pounds. Subjective:   Pt is on 3 LPM. Afebrile. VSS. No dyspnea at rest. No chest pain. Diuresed 3.7 liters yesterday. Weight 308 lbs today.      Medications:  Reviewed    Infusion Medications    sodium chloride      dextrose       Scheduled Medications    insulin lispro  10 Units Subcutaneous TID WC    carvedilol  12.5 mg Oral BID WC    potassium chloride  20 mEq Oral Daily    insulin glargine  40 Units Subcutaneous Nightly    sodium chloride flush  5-40 mL IntraVENous 2 times per day    heparin (porcine)  5,000 Units Subcutaneous 3 times per day    furosemide  40 mg IntraVENous BID    allopurinol  300 mg Oral Daily    aspirin  81 mg Oral Daily    colchicine  0.6 mg Oral Daily    ferrous sulfate  325 mg Oral TID     isosorbide mononitrate  30 mg Oral Daily    oxyCODONE-acetaminophen  1 tablet Oral 4x Daily    polyethylene glycol  17 g Oral Daily    pregabalin  50 mg Oral Dinner    senna  1 tablet Oral BID    metOLazone  2.5 mg Oral Daily    atorvastatin  40 mg Oral Nightly    spironolactone  25 mg Oral Daily    insulin lispro  0-18 Units Subcutaneous TID     insulin lispro  0-9 Units Subcutaneous Nightly     PRN Meds: sodium chloride flush, sodium chloride, ondansetron **OR** ondansetron, polyethylene glycol, acetaminophen **OR** acetaminophen, perflutren lipid microspheres, nitroGLYCERIN, morphine, glucose, dextrose, glucagon (rDNA), dextrose      Intake/Output Summary (Last 24 hours) at 8/26/2021 1207  Last data filed at 8/26/2021 1028  Gross per 24 hour   Intake 1020 ml   Output 2400 ml   Net -1380 ml       Physical Exam Performed:    BP (!) 144/80   Pulse 89   Temp 98.6 °F (37 °C) (Oral)   Resp 18   Ht 5' 9\" (1.753 m)   Wt (!) 308 lb 8 oz (139.9 kg)   SpO2 95%   BMI 45.56 kg/m²     General appearance: Obese male in no apparent distress, appears stated age and cooperative. HEENT: Pupils equal, round, and reactive to light. Conjunctivae/corneas clear. Neck: Supple, with full range of motion. No jugular venous distention. Trachea midline. Respiratory:  Normal respiratory effort. Clear to auscultation, bilaterally without Rales/Wheezes/Rhonchi. Cardiovascular: Regular rate and rhythm with normal S1/S2 without murmurs, rubs or gallops. Abdomen: Soft, non-tender, non-distended with normal bowel sounds. Musculoskeletal: No clubbing, cyanosis. ++BLE edema. Skin: Skin color, texture, turgor normal.  No rashes or lesions. Neurologic:  Neurovascularly intact without any focal sensory/motor deficits.  Cranial nerves: II-XII intact, grossly non-focal.  Psychiatric: Alert and oriented, thought content appropriate, normal insight  Capillary Refill: Brisk,3 seconds, normal   Peripheral Pulses: +2 palpable, equal bilaterally       Labs:   Recent Labs     08/23/21  1422   WBC 8.4   HGB 11.9*   HCT 35.8*        Recent Labs     08/24/21  0546 08/25/21  0627 08/26/21  0714    138 139   K 3.8 4.2 4.3   CL 97* 97* 97*   CO2 34* 30 32   BUN 50* 44* 46*   CREATININE 1.5* 1.3 1.3   CALCIUM 9.6 9.7 9.6     Recent Labs     08/23/21  1422   AST 16   ALT 12   BILITOT 0.4   ALKPHOS 93     Recent Labs     08/23/21  1802 08/24/21  0056 08/24/21  0546   TROPONINI 0.03* 0.04* 0.03*       Urinalysis:      Lab Results   Component Value Date    NITRU Negative 07/08/2019    WBCUA 0-2 11/05/2013    BACTERIA Rare 11/05/2013    RBCUA 0-2 11/05/2013    BLOODU Negative 07/08/2019    SPECGRAV 1.010 07/08/2019    GLUCOSEU Negative 07/08/2019    GLUCOSEU NEGATIVE 02/29/2012       Radiology:  XR CHEST PORTABLE   Final Result   Pulmonary vascular congestion/interstitial edema. Stable cardiomegaly. Assessment/Plan:    Active Hospital Problems    Diagnosis     Cor pulmonale, chronic (HCC) [I27.81]     Pulmonary hypertension due to alveolar hypoventilation disorder (HCC) [I27.23]     Nonrheumatic aortic valve stenosis [I35.0]     Chest pain [R07.9]     Acute diastolic congestive heart failure (HCC) [I50.31]     Severe obstructive sleep apnea [G47.33]     HLD (hyperlipidemia) [E78.5]     Morbid obesity due to excess calories ([facility] [address] [E66.01]     S/P CABG x 3 [Z95.1]     Essential hypertension [I10]     DM (diabetes mellitus) ([facility] [address] [E11.9]     Coronary artery disease involving native coronary artery of native heart without angina pectoris [I25.10]        Acute diastolic CHF:  - BNP 8221, CXR showing pulmonary edema, vascular congestion.  - Echo 4/2018 showed LVEF 55 to 60%, grade 2 DD. Updated ECHO showed normal LVEF of 55-60%, elevated LVDFP, mod AS, RVSF mod reduced, mild-mod TR, severe PHTN. - Defer diuresis to Cardiology.  Currently on lasix 40 mg IV BID, aldactone and metolazone. - Strict I's/O, daily weights and daily BMP with diuresis. Acute on chronic hypoxic respiratory failure:  - Secondary to above. - Wean supplemental O2 as tolerated. He wears 3-4 LPM at night. Currently requiring 3 LPM during the day.      Chest pain w/ elevated troponin in pt with CAD s/p CABG x3  - Troponin 0.04 --> 0.03. Likely due to CHF.    - Last cath in 2012 showed widely patent grafts. - Cardiology consulted. - Monitor pt on telemetry. - Continue aspirin, statin, carvedilol, imdur. HTN:  - Controlled, continue home medication regimen.     CKD stage 3:  - Stable, monitor. Followed by Dr. Marleen Hdz outpatient.     DM2:  - Unontrolled, A1c 8.5  - Continue basal bolus insulin -- monitor and adjust as needed. - Carb-control diet.      HLD:  - Continue statin. Severe HENNA:  -Continue home BiPAP.     Morbid obesity:  - With Body mass index is 45.56 kg/m². Complicating assessment and treatment. Placing patient at risk for multiple co-morbidities as well as early death and contributing to the patient's presentation. Counseled on weight loss.        DVT Prophylaxis: SQ heparin   Diet: ADULT DIET; Regular; 5 carb choices (75 gm/meal);  Low Sodium (2 gm); 1800 ml  Code Status: Full Code    PT/OT Eval Status: ordered with recs for [facility] [place] PT/OT    Dispo - 2-4 days pending clinical course     Haley Larios, APRN - CNP

## 2021-08-27 LAB
ANION GAP SERPL CALCULATED.3IONS-SCNC: 9 MMOL/L (ref 3–16)
BUN BLDV-MCNC: 45 MG/DL (ref 7–20)
CALCIUM SERPL-MCNC: 10.3 MG/DL (ref 8.3–10.6)
CHLORIDE BLD-SCNC: 97 MMOL/L (ref 99–110)
CO2: 33 MMOL/L (ref 21–32)
CREAT SERPL-MCNC: 1.2 MG/DL (ref 0.8–1.3)
GFR AFRICAN AMERICAN: >60
GFR NON-AFRICAN AMERICAN: >60
GLUCOSE BLD-MCNC: 172 MG/DL (ref 70–99)
GLUCOSE BLD-MCNC: 204 MG/DL (ref 70–99)
GLUCOSE BLD-MCNC: 204 MG/DL (ref 70–99)
GLUCOSE BLD-MCNC: 221 MG/DL (ref 70–99)
GLUCOSE BLD-MCNC: 263 MG/DL (ref 70–99)
MAGNESIUM: 2.1 MG/DL (ref 1.8–2.4)
PERFORMED ON: ABNORMAL
POTASSIUM SERPL-SCNC: 4.4 MMOL/L (ref 3.5–5.1)
SODIUM BLD-SCNC: 139 MMOL/L (ref 136–145)

## 2021-08-27 PROCEDURE — 99232 SBSQ HOSP IP/OBS MODERATE 35: CPT | Performed by: NURSE PRACTITIONER

## 2021-08-27 PROCEDURE — 1200000000 HC SEMI PRIVATE

## 2021-08-27 PROCEDURE — 6360000002 HC RX W HCPCS: Performed by: NURSE PRACTITIONER

## 2021-08-27 PROCEDURE — 2700000000 HC OXYGEN THERAPY PER DAY

## 2021-08-27 PROCEDURE — 6370000000 HC RX 637 (ALT 250 FOR IP): Performed by: REGISTERED NURSE

## 2021-08-27 PROCEDURE — 6370000000 HC RX 637 (ALT 250 FOR IP): Performed by: NURSE PRACTITIONER

## 2021-08-27 PROCEDURE — 6360000002 HC RX W HCPCS: Performed by: INTERNAL MEDICINE

## 2021-08-27 PROCEDURE — 94761 N-INVAS EAR/PLS OXIMETRY MLT: CPT

## 2021-08-27 PROCEDURE — 6370000000 HC RX 637 (ALT 250 FOR IP): Performed by: INTERNAL MEDICINE

## 2021-08-27 PROCEDURE — 36415 COLL VENOUS BLD VENIPUNCTURE: CPT

## 2021-08-27 PROCEDURE — 2580000003 HC RX 258: Performed by: INTERNAL MEDICINE

## 2021-08-27 PROCEDURE — 80048 BASIC METABOLIC PNL TOTAL CA: CPT

## 2021-08-27 PROCEDURE — 83735 ASSAY OF MAGNESIUM: CPT

## 2021-08-27 RX ORDER — METOLAZONE 2.5 MG/1
2.5 TABLET ORAL ONCE
Status: COMPLETED | OUTPATIENT
Start: 2021-08-27 | End: 2021-08-27

## 2021-08-27 RX ORDER — FUROSEMIDE 10 MG/ML
60 INJECTION INTRAMUSCULAR; INTRAVENOUS 2 TIMES DAILY
Status: COMPLETED | OUTPATIENT
Start: 2021-08-27 | End: 2021-08-30

## 2021-08-27 RX ADMIN — ISOSORBIDE MONONITRATE 30 MG: 30 TABLET, EXTENDED RELEASE ORAL at 08:17

## 2021-08-27 RX ADMIN — POLYETHYLENE GLYCOL 3350 17 G: 17 POWDER, FOR SOLUTION ORAL at 08:19

## 2021-08-27 RX ADMIN — FERROUS SULFATE TAB 325 MG (65 MG ELEMENTAL FE) 325 MG: 325 (65 FE) TAB at 08:16

## 2021-08-27 RX ADMIN — INSULIN LISPRO 3 UNITS: 100 INJECTION, SOLUTION INTRAVENOUS; SUBCUTANEOUS at 17:35

## 2021-08-27 RX ADMIN — SPIRONOLACTONE 25 MG: 25 TABLET, FILM COATED ORAL at 08:18

## 2021-08-27 RX ADMIN — COLCHICINE 0.6 MG: 0.6 TABLET ORAL at 08:16

## 2021-08-27 RX ADMIN — FUROSEMIDE 60 MG: 10 INJECTION, SOLUTION INTRAMUSCULAR; INTRAVENOUS at 17:32

## 2021-08-27 RX ADMIN — METOLAZONE 2.5 MG: 2.5 TABLET ORAL at 14:46

## 2021-08-27 RX ADMIN — PREGABALIN 50 MG: 25 CAPSULE ORAL at 17:28

## 2021-08-27 RX ADMIN — CARVEDILOL 12.5 MG: 6.25 TABLET, FILM COATED ORAL at 08:15

## 2021-08-27 RX ADMIN — FERROUS SULFATE TAB 325 MG (65 MG ELEMENTAL FE) 325 MG: 325 (65 FE) TAB at 12:20

## 2021-08-27 RX ADMIN — HEPARIN SODIUM 5000 UNITS: 5000 INJECTION INTRAVENOUS; SUBCUTANEOUS at 05:32

## 2021-08-27 RX ADMIN — SENNOSIDES 8.6 MG: 8.6 TABLET, FILM COATED ORAL at 20:17

## 2021-08-27 RX ADMIN — ALLOPURINOL 300 MG: 300 TABLET ORAL at 08:16

## 2021-08-27 RX ADMIN — ATORVASTATIN CALCIUM 40 MG: 40 TABLET, FILM COATED ORAL at 20:16

## 2021-08-27 RX ADMIN — INSULIN LISPRO 6 UNITS: 100 INJECTION, SOLUTION INTRAVENOUS; SUBCUTANEOUS at 08:28

## 2021-08-27 RX ADMIN — OXYCODONE AND ACETAMINOPHEN 1 TABLET: 5; 325 TABLET ORAL at 08:17

## 2021-08-27 RX ADMIN — SENNOSIDES 8.6 MG: 8.6 TABLET, FILM COATED ORAL at 08:17

## 2021-08-27 RX ADMIN — INSULIN GLARGINE 40 UNITS: 100 INJECTION, SOLUTION SUBCUTANEOUS at 20:20

## 2021-08-27 RX ADMIN — INSULIN LISPRO 10 UNITS: 100 INJECTION, SOLUTION INTRAVENOUS; SUBCUTANEOUS at 17:35

## 2021-08-27 RX ADMIN — POTASSIUM CHLORIDE 20 MEQ: 20 TABLET, EXTENDED RELEASE ORAL at 08:18

## 2021-08-27 RX ADMIN — OXYCODONE AND ACETAMINOPHEN 1 TABLET: 5; 325 TABLET ORAL at 17:28

## 2021-08-27 RX ADMIN — FUROSEMIDE 40 MG: 10 INJECTION, SOLUTION INTRAMUSCULAR; INTRAVENOUS at 08:26

## 2021-08-27 RX ADMIN — CARVEDILOL 12.5 MG: 6.25 TABLET, FILM COATED ORAL at 17:28

## 2021-08-27 RX ADMIN — SODIUM CHLORIDE, PRESERVATIVE FREE 10 ML: 5 INJECTION INTRAVENOUS at 20:17

## 2021-08-27 RX ADMIN — INSULIN LISPRO 10 UNITS: 100 INJECTION, SOLUTION INTRAVENOUS; SUBCUTANEOUS at 08:28

## 2021-08-27 RX ADMIN — FERROUS SULFATE TAB 325 MG (65 MG ELEMENTAL FE) 325 MG: 325 (65 FE) TAB at 17:29

## 2021-08-27 RX ADMIN — INSULIN GLARGINE 40 UNITS: 100 INJECTION, SOLUTION SUBCUTANEOUS at 00:23

## 2021-08-27 RX ADMIN — INSULIN LISPRO 3 UNITS: 100 INJECTION, SOLUTION INTRAVENOUS; SUBCUTANEOUS at 20:14

## 2021-08-27 RX ADMIN — INSULIN LISPRO 9 UNITS: 100 INJECTION, SOLUTION INTRAVENOUS; SUBCUTANEOUS at 12:14

## 2021-08-27 RX ADMIN — SODIUM CHLORIDE, PRESERVATIVE FREE 10 ML: 5 INJECTION INTRAVENOUS at 08:25

## 2021-08-27 RX ADMIN — ASPIRIN 81 MG 81 MG: 81 TABLET ORAL at 08:16

## 2021-08-27 RX ADMIN — METOLAZONE 2.5 MG: 2.5 TABLET ORAL at 08:17

## 2021-08-27 RX ADMIN — INSULIN LISPRO 10 UNITS: 100 INJECTION, SOLUTION INTRAVENOUS; SUBCUTANEOUS at 12:14

## 2021-08-27 RX ADMIN — OXYCODONE AND ACETAMINOPHEN 1 TABLET: 5; 325 TABLET ORAL at 12:20

## 2021-08-27 ASSESSMENT — PAIN SCALES - GENERAL
PAINLEVEL_OUTOF10: 0
PAINLEVEL_OUTOF10: 8
PAINLEVEL_OUTOF10: 8
PAINLEVEL_OUTOF10: 0
PAINLEVEL_OUTOF10: 7
PAINLEVEL_OUTOF10: 6

## 2021-08-27 ASSESSMENT — ENCOUNTER SYMPTOMS
GASTROINTESTINAL NEGATIVE: 1
SHORTNESS OF BREATH: 1

## 2021-08-27 NOTE — PROGRESS NOTES
Hospitalist Progress Note      PCP: Gerald Garcia MD    Date of Admission: 8/23/2021    Chief Complaint: Chest pain     Hospital Course:   78 y.o. male who presented to [facility] with above complaints. Pt with PMH of CAD s/p CABG, diastolic CHF, CKD, HTN, DM 2, HLD, sleep apnea, pulmonary hypertension presented to the ED today with complaints of chest pain. Pt reports retrosternal chest pain, burning in nature, feels like GERD, took PPI without much relief. Pain started few hours prior to arrival. Very minimal shortness of breath associated with this. Denies any nausea vomiting or diaphoresis. No cough. No subjective fevers chills or rigors. No abdominal pain. Pt reportedly took 4 baby aspirin prior to arrival. Did not take any NTG. Pt wears 4 L oxygen at home at baseline. Denied any palpitations or dizziness. He is a prior smoker, but currently denies any active smoking. He reports he has chronic leg wounds that are wrapped in Ace wrap, recently got it checked on Friday. Some mild chronic leg swelling. Kranthi Caban reports he sleeps in a recliner all the time. Pt reports he is weight has been increasing the past few weeks, he reports his nephrologist put him back on torsemide and metolazone which she has been taking. He reports he usually weighs 285 pounds, currently at 297 pounds. Subjective:   Pt is on 2 LPM. Afebrile. VSS. No dyspnea at rest. No chest pain. Diuresed 2 liters yesterday. Weight remains the same today at 308 lbs.      Medications:  Reviewed    Infusion Medications    sodium chloride      dextrose       Scheduled Medications    furosemide  60 mg IntraVENous BID    insulin lispro  10 Units Subcutaneous TID WC    carvedilol  12.5 mg Oral BID WC    potassium chloride  20 mEq Oral Daily    insulin glargine  40 Units Subcutaneous Nightly    sodium chloride flush  5-40 mL IntraVENous 2 times per day    heparin (porcine)  5,000 Units Subcutaneous 3 times per day    allopurinol  300 mg Oral Daily    aspirin  81 mg Oral Daily    colchicine  0.6 mg Oral Daily    ferrous sulfate  325 mg Oral TID     isosorbide mononitrate  30 mg Oral Daily    oxyCODONE-acetaminophen  1 tablet Oral 4x Daily    polyethylene glycol  17 g Oral Daily    pregabalin  50 mg Oral Dinner    senna  1 tablet Oral BID    metOLazone  2.5 mg Oral Daily    atorvastatin  40 mg Oral Nightly    spironolactone  25 mg Oral Daily    insulin lispro  0-18 Units Subcutaneous TID     insulin lispro  0-9 Units Subcutaneous Nightly     PRN Meds: sodium chloride flush, sodium chloride, ondansetron **OR** ondansetron, polyethylene glycol, acetaminophen **OR** acetaminophen, perflutren lipid microspheres, nitroGLYCERIN, morphine, glucose, dextrose, glucagon (rDNA), dextrose      Intake/Output Summary (Last 24 hours) at 8/27/2021 1454  Last data filed at 8/26/2021 2305  Gross per 24 hour   Intake 720 ml   Output 1500 ml   Net -780 ml       Physical Exam Performed:    /74   Pulse 105   Temp 97.9 °F (36.6 °C) (Oral)   Resp 16   Ht 5' 9\" (1.753 m)   Wt (!) 308 lb 10.3 oz (140 kg)   SpO2 95%   BMI 45.58 kg/m²     General appearance: Obese male in no apparent distress, appears stated age and cooperative. HEENT: Pupils equal, round, and reactive to light. Conjunctivae/corneas clear. Neck: Supple, with full range of motion. No jugular venous distention. Trachea midline. Respiratory:  Normal respiratory effort. Clear to auscultation, bilaterally without Rales/Wheezes/Rhonchi. Cardiovascular: Regular rate and rhythm with normal S1/S2 without murmurs, rubs or gallops. Abdomen: Soft, non-tender, non-distended with normal bowel sounds. Musculoskeletal: No clubbing, cyanosis. ++BLE edema. Skin: Skin color, texture, turgor normal.  No rashes or lesions. Neurologic:  Neurovascularly intact without any focal sensory/motor deficits.  Cranial nerves: II-XII intact, grossly non-focal.  Psychiatric: Alert and oriented, thought content appropriate, normal insight  Capillary Refill: Brisk,3 seconds, normal   Peripheral Pulses: +2 palpable, equal bilaterally       Labs:   Recent Labs     08/25/21  0627 08/26/21  0714 08/27/21  0550    139 139   K 4.2 4.3 4.4   CL 97* 97* 97*   CO2 30 32 33*   BUN 44* 46* 45*   CREATININE 1.3 1.3 1.2   CALCIUM 9.7 9.6 10.3     Urinalysis:      Lab Results   Component Value Date    NITRU Negative 07/08/2019    WBCUA 0-2 11/05/2013    BACTERIA Rare 11/05/2013    RBCUA 0-2 11/05/2013    BLOODU Negative 07/08/2019    SPECGRAV 1.010 07/08/2019    GLUCOSEU Negative 07/08/2019    GLUCOSEU NEGATIVE 02/29/2012       Radiology:  XR CHEST PORTABLE   Final Result   Pulmonary vascular congestion/interstitial edema. Stable cardiomegaly. Assessment/Plan:    Active Hospital Problems    Diagnosis     Cor pulmonale, chronic (HCC) [I27.81]     Pulmonary hypertension due to alveolar hypoventilation disorder (HCC) [I27.23]     Nonrheumatic aortic valve stenosis [I35.0]     Chest pain [R07.9]     Acute diastolic congestive heart failure (HCC) [I50.31]     Severe obstructive sleep apnea [G47.33]     HLD (hyperlipidemia) [E78.5]     Morbid obesity due to excess calories ([facility] [address] [E66.01]     S/P CABG x 3 [Z95.1]     Essential hypertension [I10]     DM (diabetes mellitus) ([facility] [address] [E11.9]     Coronary artery disease involving native coronary artery of native heart without angina pectoris [I25.10]        Acute diastolic CHF:  - BNP 3696, CXR showing pulmonary edema, vascular congestion.  - Echo 4/2018 showed LVEF 55 to 60%, grade 2 DD. Updated ECHO showed normal LVEF of 55-60%, elevated LVDFP, mod AS, RVSF mod reduced, mild-mod TR, severe PHTN. - Defer diuresis to Cardiology. Currently on lasix 60 mg IV BID, aldactone and metolazone. - Strict I's/O, daily weights and daily BMP with diuresis. Acute on chronic hypoxic respiratory failure:  - Secondary to above. - Wean supplemental O2 as tolerated.  He wears 3-4 LPM at night. Currently requiring 2 LPM during the day.      Chest pain w/ elevated troponin in pt with CAD s/p CABG x3  - Troponin 0.04 --> 0.03. Likely due to CHF.    - Last cath in 2012 showed widely patent grafts. - Cardiology consulted. - Monitor pt on telemetry. - Continue aspirin, statin, carvedilol, imdur.  - ECHO as noted above. HTN:  - Controlled, continue home medication regimen.     CKD stage 3:  - Stable, monitor. Followed by Dr. Karla Baez outpatient.     DM2:  - Unontrolled, A1c 8.5  - Continue basal bolus insulin -- monitor and adjust as needed. - Carb-control diet.      HLD:  - Continue statin. Severe HENNA:  -Continue home BiPAP.     Morbid obesity:  - With Body mass index is 45.58 kg/m². Complicating assessment and treatment. Placing patient at risk for multiple co-morbidities as well as early death and contributing to the patient's presentation. Counseled on weight loss.        DVT Prophylaxis: SQ heparin   Diet: ADULT DIET; Regular; 5 carb choices (75 gm/meal);  Low Sodium (2 gm); 1800 ml  Code Status: Full Code    PT/OT Eval Status: ordered with recs for [facility] PT/OT    Dispo - 2-3 days pending clinical course     PATRICK Hernandez - CNP

## 2021-08-27 NOTE — PROGRESS NOTES
[address]  Cardiology  Progress Note    Admission date:  2021    Reason for follow up visit: CHF    HPI/CC: Som Meadows is a 78 y.o. male who was admitted 2021 for shortness of breath and treated for CHF. Echo showed EF 55-60%, moderate AS, severe pulmonary HTN. Rhythm has been SR with AT. Subjective: States shortness of breath is the same, on 2L NC which he states he is on at home. No chest pain. Vitals:  Blood pressure 114/74, pulse 105, temperature 97.9 °F (36.6 °C), temperature source Oral, resp. rate 16, height 5' 9\" (1.753 m), weight (!) 308 lb 10.3 oz (140 kg), SpO2 95 %.   Temp  Av.8 °F (36.6 °C)  Min: 97.4 °F (36.3 °C)  Max: 98 °F (36.7 °C)  Pulse  Av  Min: 78  Max: 105  BP  Min: 104/65  Max: 119/69  SpO2  Av.7 %  Min: 92 %  Max: 97 %    24 hour I/O    Intake/Output Summary (Last 24 hours) at 2021 1043  Last data filed at 2021 2305  Gross per 24 hour   Intake 720 ml   Output 1500 ml   Net -780 ml     Current Facility-Administered Medications   Medication Dose Route Frequency Provider Last Rate Last Admin    insulin lispro (HUMALOG) injection vial 10 Units  10 Units Subcutaneous TID  Haley Nuñez, APRN - CNP   10 Units at 21 5074    carvedilol (COREG) tablet 12.5 mg  12.5 mg Oral BID  Carrington Alexander MD   12.5 mg at 21 0815    potassium chloride (KLOR-CON M) extended release tablet 20 mEq  20 mEq Oral Daily Carrington Alexander MD   20 mEq at 21 0818    insulin glargine (LANTUS) injection vial 40 Units  40 Units Subcutaneous Nightly [facility], APRN - CNP   40 Units at 21 0023    sodium chloride flush 0.9 % injection 5-40 mL  5-40 mL IntraVENous 2 times per day Christos Lane MD   10 mL at 21 0825    sodium chloride flush 0.9 % injection 5-40 mL  5-40 mL IntraVENous PRN Christos Lane MD        0.9 % sodium chloride infusion  25 mL IntraVENous SIMINN Christos Lane MD       [employer] ondansetron (ZOFRAN-ODT) disintegrating tablet 4 mg  4 mg Oral Q8H PRN Krysten Mejia MD        Or    ondansetron (ZOFRAN) injection 4 mg  4 mg IntraVENous Q6H PRN Krysten Mejia MD        polyethylene glycol (GLYCOLAX) packet 17 g  17 g Oral Daily PRN Krysten Mejia MD   17 g at 08/25/21 0549    acetaminophen (TYLENOL) tablet 650 mg  650 mg Oral Q6H PRN Krysten Mejia MD   650 mg at 08/25/21 0549    Or    acetaminophen (TYLENOL) suppository 650 mg  650 mg Rectal Q6H PRN Krysten Mejia MD        perflutren lipid microspheres (DEFINITY) injection 1.65 mg  1.5 mL IntraVENous ONCE PRN Krysten Mejia MD        heparin (porcine) injection 5,000 Units  5,000 Units Subcutaneous 3 times per day Krysten Mejia MD   5,000 Units at 08/27/21 0532    furosemide (LASIX) injection 40 mg  40 mg IntraVENous BID Krysten Mejia MD   40 mg at 08/27/21 0826    nitroGLYCERIN (NITROSTAT) SL tablet 0.4 mg  0.4 mg Sublingual Q5 Min PRN Krysten Mejia MD        morphine (PF) injection 2 mg  2 mg IntraVENous Q4H PRN Krysten Mejia MD        allopurinol (ZYLOPRIM) tablet 300 mg  300 mg Oral Daily Krysten Mejia MD   300 mg at 08/27/21 0816    aspirin chewable tablet 81 mg  81 mg Oral Daily Krysten Mejia MD   81 mg at 08/27/21 0816    colchicine (COLCRYS) tablet 0.6 mg  0.6 mg Oral Daily Krysten Mejia MD   0.6 mg at 08/27/21 0816    ferrous sulfate (IRON 325) tablet 325 mg  325 mg Oral TID WC Krysten Mejia MD   325 mg at 08/27/21 0816    glucose (GLUTOSE) 40 % oral gel 15 g  15 g Oral PRN Krysten Mejia MD        dextrose 50 % IV solution  12.5 g IntraVENous PRN Krysten Mejia MD        glucagon (rDNA) injection 1 mg  1 mg IntraMUSCular PRN Krysten Mejia MD        dextrose 5 % solution  100 mL/hr IntraVENous PRN Krysten Mejia MD        isosorbide mononitrate (IMDUR) extended release tablet 30 mg  30 mg Oral Daily Darcy Daly MD   30 mg at 08/27/21 0817    oxyCODONE-acetaminophen (PERCOCET) 5-325 MG per tablet 1 tablet  1 tablet Oral 4x Daily Darcy Daly MD   1 tablet at 08/27/21 0817    polyethylene glycol (GLYCOLAX) packet 17 g  17 g Oral Daily Darcy Daly MD   17 g at 08/27/21 0819    pregabalin (LYRICA) capsule 50 mg  50 mg Oral Dinner Darcy Daly MD   50 mg at 08/26/21 1547    senna (SENOKOT) tablet 8.6 mg  1 tablet Oral BID Darcy Daly MD   8.6 mg at 08/27/21 0817    metOLazone (ZAROXOLYN) tablet 2.5 mg  2.5 mg Oral Daily Darcy Daly MD   2.5 mg at 08/27/21 0817    atorvastatin (LIPITOR) tablet 40 mg  40 mg Oral Nightly Alee Don MD   40 mg at 08/26/21 2222    spironolactone (ALDACTONE) tablet 25 mg  25 mg Oral Daily Alee Don MD   25 mg at 08/27/21 0818    insulin lispro (HUMALOG) injection vial 0-18 Units  0-18 Units Subcutaneous TID [facility] Haley Emilio Layer, APRN - CNP   6 Units at 08/27/21 5832    insulin lispro (HUMALOG) injection vial 0-9 Units  0-9 Units Subcutaneous Nightly [facility] Kee, APRN - CNP   3 Units at 08/26/21 2223     Review of Systems   Constitutional: Negative. Respiratory: Positive for shortness of breath. Cardiovascular: Positive for leg swelling. Negative for chest pain. Gastrointestinal: Negative. Neurological: Negative.       Objective:     Telemetry monitor: SR vs AT    Physical Exam:  Constitutional:  Comfortable and alert, NAD, appears older than stated age  Eyes: PERRL, sclera nonicteric  Neck:  Supple, no masses, no thyroidmegaly, JVD difficult to assess  Skin:  Warm and dry; no rash or lesions  Heart: Regular, normal apex, S1 and S2 normal, no M/G/R  Lungs:  Normal respiratory effort; diminished  Abdomen: soft, non tender, + bowel sounds  Extremities:  +BLE edema and redness  Neuro: alert and oriented, moves legs and arms equally, normal mood and affect    Data Reviewed:    Echo 8/25/2021: Technically difficult examination. Normal left ventricular systolic function with ejection fraction of 55-60%. Paradoxic septal motion   Mild concentric left ventricular hypertrophy. Left ventricular diastolic filling pressure is elevated. Compared to previous study from 4- no changes noted in left   ventricular function. Mild mitral and pulmonic regurgitation. Bi-atrial enlargement. Moderate aortic stenosis, difficult to fully evaluate on this study   The right ventricle is moderately enlarged. Right ventricular systolic function is moderately reduced . Mild to moderate tricuspid regurgitation. Systolic pulmonic artery pressure (SPAP) is estimated at 65 mmHg consistent   with severe pulmonary hypertension (Right atrial pressure of 15 mmHg),   however difficult to accurately measure pressures on this study. The right atrium is moderately dilated. Coronary angiogram 2012:  1. The right coronary artery proximally has an 80% lesion. Distally there  is a 90% lesion. 2.  The left main artery has luminal irregularities. The LAD is occluded  proximally. 3.  The circumflex artery has 2 large marginal arteries that have diffuse 40%  lesion. 4.  The KAVYA to the distal LAD is widely patent. 5.  The SVG to the first diagonal is widely patent. 6.  The SVG to the RPL is widely patent. IMPRESSION:   1. Normal left ventricular function with normal cardiac output. 2.  Severely elevated left ventricular end diastolic pressure. 3.  Normal cardiac output. 4.  Severe pulmonary venous hypertension. PLAN:    1. Continue diuresis and afterload reduction. 2.  Salt restriction. 3.  Evaluation with a sleep study.     Lab Reviewed:     Renal Profile:  Lab Results   Component Value Date    CREATININE 1.2 08/27/2021    BUN 45 08/27/2021     08/27/2021    K 4.4 08/27/2021    K 4.0 08/23/2021    CL 97 08/27/2021    CO2 33 08/27/2021     CBC:    Lab Results   Component Value Date    WBC 8.4 08/23/2021    RBC 4.13 08/23/2021    HGB 11.9 08/23/2021    HCT 35.8 08/23/2021    MCV 86.8 08/23/2021    RDW 17.9 08/23/2021     08/23/2021     BNP:    Lab Results   Component Value Date    PROBNP 1,593 08/26/2021    PROBNP 1,446 08/23/2021    PROBNP 234 04/29/2018    PROBNP 496 04/27/2018    PROBNP 1,093 04/25/2018     Fasting Lipid Panel:    Lab Results   Component Value Date    CHOL 104 08/24/2021    HDL 33 08/24/2021    HDL 31 01/11/2012    TRIG 110 08/24/2021     Cardiac Enzymes:  CK/MbTroponin  Lab Results   Component Value Date    CKTOTAL 149 11/16/2012    TROPONINI 0.03 08/24/2021     PT/ INR   Lab Results   Component Value Date    INR 1.22 09/17/2015    INR 1.18 11/24/2014    INR 1.10 01/05/2013    PROTIME 13.3 09/17/2015    PROTIME 12.9 11/24/2014    PROTIME 12.5 01/05/2013     PTT No results found for: PTT   Lab Results   Component Value Date    MG 2.10 08/27/2021      Lab Results   Component Value Date    TSH 0.64 08/24/2021     All labs and imaging reviewed today    Assessment:  Acute on chronic diastolic CHF: ongoing, -4.9U  CAD: patent grafts on angiogram 2012; s/p CABG 2011  Aortic stenosis: moderate  Atrial tachycardia: ongoing but stable, evaluated by EP, no AF/aflutter noted  Pulmonary HTN: severe  HTN: stable  HLD  HENNA  CKD  DM  Obesity    Plan:   1. Increase lasix to 60 mg BID and give additional metolazone today, ongoing edema and no weight loss overnight  2. Continue aspirin, statin, carvedilol, imdur, spironolactone   3.  Daily weights, strict I/Os    PATRICK Correa-GILBERTO  [address]  [phone]

## 2021-08-27 NOTE — CONSULTS
Electrophysiology Consultation   Date: 8/26/2021  Admit Date:  8/23/2021  Reason for Consultation: Arrhythmia  Consult Requesting Physician: Valeri Moore MD     Chief Complaint   Patient presents with    Chest Pain     pt to ED via EMS from home with chest pain. hx of CHF. denies SOB. took 4 baby aspirin PTA and denied nitro. wears 4 L O2 at home at baseline     HPI:   Mr. Elo King is a pleasant 78year old [address] with a medical history significant for ischemic cardiomyopathy status post CABG (2011), heart failure with preserved ejection fraction, diabetes mellitus type II, hypertension, morbid obesity, hyperlipidemia, and obstructive sleep apnea who presented from home with worsening shortness of breath, dyspnea on exertion and left ear pain (working with ENT). According patient has been dealing with left ear pain admitted with ENT. He actually presented to [facility] with chest pain more so than shortness of breath. He was admitted and seen by cardiology. Diuresis was initiated and patient was placed on telemetry. EKG showed arrhythmia concerning for possible atrial fibrillation (poor p waves given size). Telemetry showed variable conduction and possible trial fibrillation without significant tachycardia. Electrophysiology was consulted to evaluate patient.     Patient denies fevers, abdominal swelling, chills, visual changes, headaches, sore throat, cough, abdominal pain, nausea, vomiting, bleeding, bruising, dysuria, muscle/joint pain, confusion, depression, anxiety, skin lesions, etc.    Past Medical History:   Diagnosis Date    Anemia     Angina     Arthritis     CAD (coronary artery disease)     CHF (congestive heart failure) (HCC)     CKD (chronic kidney disease) stage 3, GFR 30-59 ml/min ([facility])     Clostridium difficile diarrhea 3/16/12; 2/29/12    positive stool toxin    Diabetes mellitus ([facility] [address]     Diabetic neuropathy ([facility])     feet and legs    Disease of blood and blood negative for - bleeding, blood clots, bruising or jaundice  · Genito-Urinary:  negative for - Dysuria or incontinence  · Musculoskeletal: negative for - Joint swelling, muscle pain  · Neurological: negative for - confusion, dizziness, headaches   · Psychiatric: No anxiety, no depression. · Dermatological: negative for - rash    Physical Examination:  Vitals:    21   BP: 104/65   Pulse: 81   Resp: 20   Temp: 97.8 °F (36.6 °C)   SpO2: 95%        Intake/Output Summary (Last 24 hours) at 2021  Last data filed at 2021  Gross per 24 hour   Intake 1740 ml   Output 2300 ml   Net -560 ml     In: 1020 [P.O.:1020]  Out: 2300    Wt Readings from Last 3 Encounters:   21 (!) 308 lb 8 oz (139.9 kg)   21 (!) 309 lb (140.2 kg)   12/15/20 (!) 309 lb (140.2 kg)     Temp  Av.1 °F (36.7 °C)  Min: 97.4 °F (36.3 °C)  Max: 99 °F (37.2 °C)  Pulse  Av  Min: 78  Max: 89  BP  Min: 104/65  Max: 144/80  SpO2  Av.3 %  Min: 92 %  Max: 96 %    · Telemetry: Atrial arrhythmia, likely atrial tachycardia. · Constitutional: Alert. Oriented to person, place, and time. No distress. · Head: Normocephalic and atraumatic. · Mouth/Throat: Lips appear moist. Oropharynx is clear and moist.  · Eyes: Conjunctivae normal. EOM are normal.   · Neck: Neck supple. No lymphadenopathy. No rigidity. No JVD present. · Cardiovascular: Distant heart sounds. RRR w/o M/G/R  · Pulmonary/Chest: Bilateral respiratory sounds present. No respiratory accessory muscle use. · Abdominal: Soft. Normal bowel sounds present. No distension, No tenderness. No splenomegaly. No hernia. · Musculoskeletal: No tenderness. No edema    · Neurological: Alert and oriented. Cranial nerve II-XII grossly intact. · Skin: Skin is warm and dry. No rash, lesions, ulcerations noted. · Psychiatric: No anxiety nor agitation. Labs:  Reviewed.    Recent Labs     21  0546 21  0627 21  0714    138 139   K 3.8 4.2 4.3   CL 97* 97* 97*   CO2 34* 30 32   BUN 50* 44* 46*   CREATININE 1.5* 1.3 1.3     No results for input(s): WBC, HGB, HCT, MCV, PLT in the last 72 hours. Lab Results   Component Value Date    CKTOTAL 149 11/16/2012    TROPONINI 0.03 08/24/2021     Lab Results   Component Value Date    .0 11/23/2016    .4 11/10/2016    BNP 77.6 10/24/2016     Lab Results   Component Value Date    PROTIME 13.3 09/17/2015    PROTIME 12.9 11/24/2014    PROTIME 12.5 01/05/2013    INR 1.22 09/17/2015    INR 1.18 11/24/2014    INR 1.10 01/05/2013     Lab Results   Component Value Date    CHOL 104 08/24/2021    HDL 33 08/24/2021    HDL 31 01/11/2012    TRIG 110 08/24/2021       Diagnostic and imaging results reviewed. ECG: Likely atrial tachycardia (based on adenosine challenge). Echo: 08/25/2021   Summary   Technically difficult examination. Normal left ventricular systolic function with ejection fraction of 55-60%. Paradoxic septal motion   Mild concentric left ventricular hypertrophy. Left ventricular diastolic filling pressure is elevated. Compared to previous study from 4- no changes noted in left   ventricular function. Mild mitral and pulmonic regurgitation. Bi-atrial enlargement. Moderate aortic stenosis, difficult to fully evaluate on this study   The right ventricle is moderately enlarged. Right ventricular systolic function is moderately reduced . Mild to moderate tricuspid regurgitation. Systolic pulmonic artery pressure (SPAP) is estimated at 65 mmHg consistent   with severe pulmonary hypertension (Right atrial pressure of 15 mmHg),   however difficult to accurately measure pressures on this study. The right atrium is moderately dilated. I independently reviewed the ECG and telemetry.     Scheduled Meds:   insulin lispro  10 Units Subcutaneous TID WC    carvedilol  12.5 mg Oral BID WC    potassium chloride  20 mEq Oral Daily    insulin glargine  40 Units Subcutaneous Nightly    sodium chloride flush  5-40 mL IntraVENous 2 times per day    heparin (porcine)  5,000 Units Subcutaneous 3 times per day    furosemide  40 mg IntraVENous BID    allopurinol  300 mg Oral Daily    aspirin  81 mg Oral Daily    colchicine  0.6 mg Oral Daily    ferrous sulfate  325 mg Oral TID WC    isosorbide mononitrate  30 mg Oral Daily    oxyCODONE-acetaminophen  1 tablet Oral 4x Daily    polyethylene glycol  17 g Oral Daily    pregabalin  50 mg Oral Dinner    senna  1 tablet Oral BID    metOLazone  2.5 mg Oral Daily    atorvastatin  40 mg Oral Nightly    spironolactone  25 mg Oral Daily    insulin lispro  0-18 Units Subcutaneous TID     insulin lispro  0-9 Units Subcutaneous Nightly     Continuous Infusions:   sodium chloride      dextrose       PRN Meds:.sodium chloride flush, sodium chloride, ondansetron **OR** ondansetron, polyethylene glycol, acetaminophen **OR** acetaminophen, perflutren lipid microspheres, nitroGLYCERIN, morphine, glucose, dextrose, glucagon (rDNA), dextrose     Assessment:   Patient Active Problem List    Diagnosis Date Noted    Cor pulmonale, chronic (HCC)     Pulmonary hypertension due to alveolar hypoventilation disorder (HCC)     Nonrheumatic aortic valve stenosis     Chest pain 08/23/2021    Mixed conductive and sensorineural hearing loss of left ear with restricted hearing of right ear 10/15/2020    Tympanic membrane perforation, left 10/15/2020    Lumbar facet arthropathy 02/11/2019    Disc displacement, lumbar 02/11/2019    Spinal stenosis of lumbar region 02/11/2019    Degeneration of lumbar or lumbosacral intervertebral disc 09/24/2018    Displacement of lumbar intervertebral disc without myelopathy 09/24/2018    Lumbosacral spondylosis without myelopathy 09/24/2018    Acute diastolic congestive heart failure ([address] 04/24/2018    Severe obstructive sleep apnea 04/03/2018    Obesity, Class III, BMI 40-49.9 (morbid obesity) ([address] 04/03/2018    Chronic pain 09/13/2017    Productive cough     Cardiomyopathy ([facility] [address] 10/19/2015    HLD (hyperlipidemia) 06/24/2015    Morbid obesity with BMI of 50.0-59.9, adult ([facility] [address]     DM2 (diabetes mellitus, type 2) ([facility] [address] 04/14/2015    Pulmonary hypertension due to left ventricular diastolic dysfunction ([address] 01/07/2013    Morbid obesity due to excess calories ([facility] [address] 01/07/2013    S/P CABG x 3 01/07/2013    Chronic diastolic heart failure ([facility] [address] 01/03/2013    Hyperkalemia 09/01/2012    Essential hypertension 02/06/2012    Anemia of chronic disease 02/04/2012    DM (diabetes mellitus) ([facility] [address] 01/11/2012    Coronary artery disease involving native coronary artery of native heart without angina pectoris 01/11/2012      Active Hospital Problems    Diagnosis Date Noted    Cor pulmonale, chronic (HCC) [I27.81]     Pulmonary hypertension due to alveolar hypoventilation disorder (HCC) [I27.23]     Nonrheumatic aortic valve stenosis [I35.0]     Chest pain [R07.9] 08/23/2021    Acute diastolic congestive heart failure ([facility] [address] [I50.31] 04/24/2018    Severe obstructive sleep apnea [G47.33] 04/03/2018    HLD (hyperlipidemia) [E78.5] 06/24/2015    Morbid obesity due to excess calories ([facility] [address] [E66.01] 01/07/2013    S/P CABG x 3 [Z95.1] 01/07/2013    Essential hypertension [I10] 02/06/2012    DM (diabetes mellitus) ([facility] [address] [E11.9] 01/11/2012    Coronary artery disease involving native coronary artery of native heart without angina pectoris [I25.10] 01/11/2012       Mr. Юлия Vallejo is a pleasant 78year old [employer] with a medical history significant for ischemic cardiomyopathy status post CABG (2011), heart failure with preserved ejection fraction, diabetes mellitus type II, hypertension, morbid obesity, hyperlipidemia, and obstructive sleep apnea who presented from home with worsening shortness of breath, dyspnea on exertion and left ear pain (working with ENT). Problem List:  1. Atrial tachycardia.   2.

## 2021-08-28 LAB
ANION GAP SERPL CALCULATED.3IONS-SCNC: 12 MMOL/L (ref 3–16)
BUN BLDV-MCNC: 43 MG/DL (ref 7–20)
CALCIUM SERPL-MCNC: 9.9 MG/DL (ref 8.3–10.6)
CHLORIDE BLD-SCNC: 95 MMOL/L (ref 99–110)
CO2: 30 MMOL/L (ref 21–32)
CREAT SERPL-MCNC: 1.3 MG/DL (ref 0.8–1.3)
GFR AFRICAN AMERICAN: >60
GFR NON-AFRICAN AMERICAN: 55
GLUCOSE BLD-MCNC: 173 MG/DL (ref 70–99)
GLUCOSE BLD-MCNC: 174 MG/DL (ref 70–99)
GLUCOSE BLD-MCNC: 207 MG/DL (ref 70–99)
GLUCOSE BLD-MCNC: 216 MG/DL (ref 70–99)
GLUCOSE BLD-MCNC: 260 MG/DL (ref 70–99)
MAGNESIUM: 2.1 MG/DL (ref 1.8–2.4)
PERFORMED ON: ABNORMAL
POTASSIUM SERPL-SCNC: 4.1 MMOL/L (ref 3.5–5.1)
SODIUM BLD-SCNC: 137 MMOL/L (ref 136–145)

## 2021-08-28 PROCEDURE — 2700000000 HC OXYGEN THERAPY PER DAY

## 2021-08-28 PROCEDURE — 36415 COLL VENOUS BLD VENIPUNCTURE: CPT

## 2021-08-28 PROCEDURE — 6360000002 HC RX W HCPCS: Performed by: NURSE PRACTITIONER

## 2021-08-28 PROCEDURE — 6370000000 HC RX 637 (ALT 250 FOR IP): Performed by: REGISTERED NURSE

## 2021-08-28 PROCEDURE — 80048 BASIC METABOLIC PNL TOTAL CA: CPT

## 2021-08-28 PROCEDURE — 99232 SBSQ HOSP IP/OBS MODERATE 35: CPT | Performed by: NURSE PRACTITIONER

## 2021-08-28 PROCEDURE — 83735 ASSAY OF MAGNESIUM: CPT

## 2021-08-28 PROCEDURE — 1200000000 HC SEMI PRIVATE

## 2021-08-28 PROCEDURE — 6370000000 HC RX 637 (ALT 250 FOR IP): Performed by: NURSE PRACTITIONER

## 2021-08-28 PROCEDURE — 94761 N-INVAS EAR/PLS OXIMETRY MLT: CPT

## 2021-08-28 PROCEDURE — 6370000000 HC RX 637 (ALT 250 FOR IP): Performed by: INTERNAL MEDICINE

## 2021-08-28 PROCEDURE — 6360000002 HC RX W HCPCS: Performed by: INTERNAL MEDICINE

## 2021-08-28 PROCEDURE — 2580000003 HC RX 258: Performed by: INTERNAL MEDICINE

## 2021-08-28 RX ORDER — OXYCODONE HYDROCHLORIDE AND ACETAMINOPHEN 5; 325 MG/1; MG/1
1 TABLET ORAL ONCE
Status: COMPLETED | OUTPATIENT
Start: 2021-08-28 | End: 2021-08-28

## 2021-08-28 RX ADMIN — INSULIN LISPRO 3 UNITS: 100 INJECTION, SOLUTION INTRAVENOUS; SUBCUTANEOUS at 09:23

## 2021-08-28 RX ADMIN — POTASSIUM CHLORIDE 20 MEQ: 20 TABLET, EXTENDED RELEASE ORAL at 09:12

## 2021-08-28 RX ADMIN — SODIUM CHLORIDE, PRESERVATIVE FREE 10 ML: 5 INJECTION INTRAVENOUS at 09:20

## 2021-08-28 RX ADMIN — SENNOSIDES 8.6 MG: 8.6 TABLET, FILM COATED ORAL at 21:32

## 2021-08-28 RX ADMIN — INSULIN LISPRO 6 UNITS: 100 INJECTION, SOLUTION INTRAVENOUS; SUBCUTANEOUS at 17:38

## 2021-08-28 RX ADMIN — FUROSEMIDE 60 MG: 10 INJECTION, SOLUTION INTRAMUSCULAR; INTRAVENOUS at 19:23

## 2021-08-28 RX ADMIN — SPIRONOLACTONE 25 MG: 25 TABLET, FILM COATED ORAL at 09:12

## 2021-08-28 RX ADMIN — INSULIN GLARGINE 40 UNITS: 100 INJECTION, SOLUTION SUBCUTANEOUS at 21:39

## 2021-08-28 RX ADMIN — METOLAZONE 2.5 MG: 2.5 TABLET ORAL at 09:12

## 2021-08-28 RX ADMIN — OXYCODONE AND ACETAMINOPHEN 1 TABLET: 5; 325 TABLET ORAL at 09:12

## 2021-08-28 RX ADMIN — ISOSORBIDE MONONITRATE 30 MG: 30 TABLET, EXTENDED RELEASE ORAL at 09:12

## 2021-08-28 RX ADMIN — OXYCODONE AND ACETAMINOPHEN 1 TABLET: 5; 325 TABLET ORAL at 17:07

## 2021-08-28 RX ADMIN — OXYCODONE HYDROCHLORIDE AND ACETAMINOPHEN 1 TABLET: 5; 325 TABLET ORAL at 04:14

## 2021-08-28 RX ADMIN — HEPARIN SODIUM 5000 UNITS: 5000 INJECTION INTRAVENOUS; SUBCUTANEOUS at 21:35

## 2021-08-28 RX ADMIN — FERROUS SULFATE TAB 325 MG (65 MG ELEMENTAL FE) 325 MG: 325 (65 FE) TAB at 12:34

## 2021-08-28 RX ADMIN — ALLOPURINOL 300 MG: 300 TABLET ORAL at 09:12

## 2021-08-28 RX ADMIN — FERROUS SULFATE TAB 325 MG (65 MG ELEMENTAL FE) 325 MG: 325 (65 FE) TAB at 17:07

## 2021-08-28 RX ADMIN — ATORVASTATIN CALCIUM 40 MG: 40 TABLET, FILM COATED ORAL at 21:32

## 2021-08-28 RX ADMIN — INSULIN LISPRO 10 UNITS: 100 INJECTION, SOLUTION INTRAVENOUS; SUBCUTANEOUS at 09:23

## 2021-08-28 RX ADMIN — CARVEDILOL 12.5 MG: 6.25 TABLET, FILM COATED ORAL at 17:07

## 2021-08-28 RX ADMIN — HEPARIN SODIUM 5000 UNITS: 5000 INJECTION INTRAVENOUS; SUBCUTANEOUS at 14:16

## 2021-08-28 RX ADMIN — FUROSEMIDE 60 MG: 10 INJECTION, SOLUTION INTRAMUSCULAR; INTRAVENOUS at 09:18

## 2021-08-28 RX ADMIN — ASPIRIN 81 MG 81 MG: 81 TABLET ORAL at 09:12

## 2021-08-28 RX ADMIN — COLCHICINE 0.6 MG: 0.6 TABLET ORAL at 09:17

## 2021-08-28 RX ADMIN — INSULIN LISPRO 10 UNITS: 100 INJECTION, SOLUTION INTRAVENOUS; SUBCUTANEOUS at 17:38

## 2021-08-28 RX ADMIN — OXYCODONE AND ACETAMINOPHEN 1 TABLET: 5; 325 TABLET ORAL at 13:14

## 2021-08-28 RX ADMIN — PREGABALIN 50 MG: 25 CAPSULE ORAL at 17:07

## 2021-08-28 RX ADMIN — FERROUS SULFATE TAB 325 MG (65 MG ELEMENTAL FE) 325 MG: 325 (65 FE) TAB at 08:04

## 2021-08-28 RX ADMIN — INSULIN LISPRO 10 UNITS: 100 INJECTION, SOLUTION INTRAVENOUS; SUBCUTANEOUS at 12:38

## 2021-08-28 RX ADMIN — SODIUM CHLORIDE, PRESERVATIVE FREE 10 ML: 5 INJECTION INTRAVENOUS at 21:35

## 2021-08-28 RX ADMIN — INSULIN LISPRO 3 UNITS: 100 INJECTION, SOLUTION INTRAVENOUS; SUBCUTANEOUS at 21:35

## 2021-08-28 RX ADMIN — INSULIN LISPRO 9 UNITS: 100 INJECTION, SOLUTION INTRAVENOUS; SUBCUTANEOUS at 12:39

## 2021-08-28 RX ADMIN — POLYETHYLENE GLYCOL 3350 17 G: 17 POWDER, FOR SOLUTION ORAL at 09:11

## 2021-08-28 RX ADMIN — CARVEDILOL 12.5 MG: 6.25 TABLET, FILM COATED ORAL at 08:04

## 2021-08-28 RX ADMIN — OXYCODONE AND ACETAMINOPHEN 1 TABLET: 5; 325 TABLET ORAL at 21:32

## 2021-08-28 RX ADMIN — SENNOSIDES 8.6 MG: 8.6 TABLET, FILM COATED ORAL at 09:12

## 2021-08-28 ASSESSMENT — PAIN SCALES - GENERAL
PAINLEVEL_OUTOF10: 7
PAINLEVEL_OUTOF10: 7
PAINLEVEL_OUTOF10: 6
PAINLEVEL_OUTOF10: 8
PAINLEVEL_OUTOF10: 8
PAINLEVEL_OUTOF10: 7
PAINLEVEL_OUTOF10: 8
PAINLEVEL_OUTOF10: 9
PAINLEVEL_OUTOF10: 9

## 2021-08-28 ASSESSMENT — PAIN DESCRIPTION - ORIENTATION
ORIENTATION: RIGHT;LEFT
ORIENTATION: RIGHT;LEFT

## 2021-08-28 ASSESSMENT — PAIN DESCRIPTION - PAIN TYPE
TYPE: ACUTE PAIN

## 2021-08-28 ASSESSMENT — ENCOUNTER SYMPTOMS
GASTROINTESTINAL NEGATIVE: 1
SHORTNESS OF BREATH: 1

## 2021-08-28 ASSESSMENT — PAIN DESCRIPTION - LOCATION
LOCATION: LEG

## 2021-08-28 ASSESSMENT — PAIN DESCRIPTION - DESCRIPTORS
DESCRIPTORS: ACHING;THROBBING
DESCRIPTORS: ACHING;THROBBING

## 2021-08-28 NOTE — PLAN OF CARE
Problem: OXYGENATION/RESPIRATORY FUNCTION  Goal: Patient will maintain patent airway  Outcome: Ongoing  Note:   Patient's EF (Ejection Fraction) is greater than 40%    Heart Failure Medications:  Diuretics[de-identified] Furosemide, Spironolactone, and Metalozone    (One of the following REQUIRED for EF <40%/SYSTOLIC FAILURE but MAY be used in EF% >40%/DIASTOLIC FAILURE)        ACE[de-identified] None        ARB[de-identified] None         ARNI[de-identified] None    (Beta Blockers)  NON- Evidenced Based Beta Blocker (for EF% >40%/DIASTOLIC FAILURE): None    Evidenced Based Beta Blocker::(REQUIRED for EF% <40%/SYSTOLIC FAILURE) Carvedilol- Coreg  . .................................................................................................................................................. Patient's weights and intake/output reviewed: Yes    Patient's Last Weight: 308 lbs obtained by  method unknown . Difference of 0 lbs less than last documented weight. Intake/Output Summary (Last 24 hours) at 8/28/2021 0514  Last data filed at 8/28/2021 0401  Gross per 24 hour   Intake 120 ml   Output 700 ml   Net -580 ml       Comorbidities Reviewed Yes    Patient has a past medical history of Anemia, Angina, Arthritis, CAD (coronary artery disease), CHF (congestive heart failure) ([facility] [address] CKD (chronic kidney disease) stage 3, GFR 30-59 ml/min ([facility]), Clostridium difficile diarrhea, Diabetes mellitus ([facility] [address] Diabetic neuropathy ([facility] [address] Disease of blood and blood forming organ, Dizziness, GERD (gastroesophageal reflux disease), Headache, Hearing loss, Hyperlipidemia, Hypertension, Kidney stone, Refusal of blood transfusions as patient is [employer], Sleep apnea, Tinnitus, Venous ulcer ([address] and Wound, open.      >>For CHF and Comorbidity documentation on Education Time and Topics, please see Education Tab    Progressive Mobility Assessment:  What is this patient's Current Level of Mobility?: Ambulatory-Up Ad Mami  How was this patient Mobilized today?: Edge of Bed, Up to Chair,  Up to Toilet/Shower, and Up in Room                 With Whom? Self                 Level of Difficulty/Assistance: Independent     Pt resting in bed at this time on  3 L O2. Pt denies shortness of breath. Pt with nonpitting lower extremity edema. Patient and/or Family's stated Goal of Care this Admission: increase activity tolerance, be more comfortable, and reduce lower extremity edema prior to discharge        :      Problem: Metabolic:  Goal: Ability to maintain appropriate glucose levels will improve  Description: Ability to maintain appropriate glucose levels will improve  Outcome: Ongoing  Note: Pt will have accuchecks before meals and at bedtime with sliding scale insulin in place for coverage. Will continue to monitor for signs and symptoms of hypoglycemia and hyperglycemia throughout shift. Problem: Pain:  Goal: Pain level will decrease  Description: Pain level will decrease  Outcome: Ongoing  Note: Pt will be satisfied with pain control. Pt uses numeric pain rating scale with reassessments after pain med administration. Will continue to monitor progression throughout shift.       Problem: Skin Integrity:  Goal: Will show no infection signs and symptoms  Description: Will show no infection signs and symptoms  Outcome: Ongoing

## 2021-08-28 NOTE — PROGRESS NOTES
PRN Michael Xavier MD        0.9 % sodium chloride infusion  25 mL IntraVENous PRN Michael Xavier MD        ondansetron (ZOFRAN-ODT) disintegrating tablet 4 mg  4 mg Oral Q8H PRN Michael Xavier MD        Or    ondansetron (ZOFRAN) injection 4 mg  4 mg IntraVENous Q6H PRN Michael Xavier MD        polyethylene glycol (GLYCOLAX) packet 17 g  17 g Oral Daily PRN Michael Xavier MD   17 g at 08/25/21 0549    acetaminophen (TYLENOL) tablet 650 mg  650 mg Oral Q6H PRN Michael Xavier MD   650 mg at 08/25/21 0549    Or    acetaminophen (TYLENOL) suppository 650 mg  650 mg Rectal Q6H PRN Michael Xavier MD        perflutren lipid microspheres (DEFINITY) injection 1.65 mg  1.5 mL IntraVENous ONCE PRN Michael Xavier MD        heparin (porcine) injection 5,000 Units  5,000 Units Subcutaneous 3 times per day Michael Xavier MD   5,000 Units at 08/27/21 0532    nitroGLYCERIN (NITROSTAT) SL tablet 0.4 mg  0.4 mg Sublingual Q5 Min PRN Michael Xavier MD        morphine (PF) injection 2 mg  2 mg IntraVENous Q4H PRN Michael Xavier MD        allopurinol (ZYLOPRIM) tablet 300 mg  300 mg Oral Daily Michael Xavier MD   300 mg at 08/28/21 0912    aspirin chewable tablet 81 mg  81 mg Oral Daily Michael Xavier MD   81 mg at 08/28/21 0912    colchicine (COLCRYS) tablet 0.6 mg  0.6 mg Oral Daily Michael Xavier MD   0.6 mg at 08/28/21 9400    ferrous sulfate (IRON 325) tablet 325 mg  325 mg Oral TID WC Michael Xavier MD   325 mg at 08/28/21 0804    glucose (GLUTOSE) 40 % oral gel 15 g  15 g Oral PRN Michael Xavier MD        dextrose 50 % IV solution  12.5 g IntraVENous PRN Michael Xavier MD        glucagon (rDNA) injection 1 mg  1 mg IntraMUSCular PRN Michael Xavier MD        dextrose 5 % solution  100 mL/hr IntraVENous PRN Michael Xavier MD        isosorbide mononitrate (IMDUR) extended release tablet 30 mg  30 mg Oral Daily Nicole Ayala MD   30 mg at 08/28/21 0912    oxyCODONE-acetaminophen (PERCOCET) 5-325 MG per tablet 1 tablet  1 tablet Oral 4x Daily Nicole Ayala MD   1 tablet at 08/28/21 0912    polyethylene glycol (GLYCOLAX) packet 17 g  17 g Oral Daily Nicole Ayala MD   17 g at 08/28/21 0911    pregabalin (LYRICA) capsule 50 mg  50 mg Oral Dinner Nicole Ayala MD   50 mg at 08/27/21 1728    senna (SENOKOT) tablet 8.6 mg  1 tablet Oral BID Nicole Ayala MD   8.6 mg at 08/28/21 0912    metOLazone (ZAROXOLYN) tablet 2.5 mg  2.5 mg Oral Daily Nicole Ayala MD   2.5 mg at 08/28/21 0912    atorvastatin (LIPITOR) tablet 40 mg  40 mg Oral Nightly Mckay Mckeon MD   40 mg at 08/27/21 2016    spironolactone (ALDACTONE) tablet 25 mg  25 mg Oral Daily Mckay Mckeon MD   25 mg at 08/28/21 0912    insulin lispro (HUMALOG) injection vial 0-18 Units  0-18 Units Subcutaneous TID WC Haley Paxton Winkler, APRN - CNP   3 Units at 08/27/21 1735    insulin lispro (HUMALOG) injection vial 0-9 Units  0-9 Units Subcutaneous Nightly [facility] Kee, APRN - CNP   3 Units at 08/27/21 2014     Review of Systems   Constitutional: Negative. Respiratory: Positive for shortness of breath. Cardiovascular: Positive for leg swelling. Negative for chest pain. Gastrointestinal: Negative. Neurological: Negative.       Objective:     Telemetry monitor: SR vs AT    Physical Exam:  Constitutional:  Comfortable and alert, NAD, appears older than stated age  Eyes: PERRL, sclera nonicteric  Neck:  Supple, no masses, no thyroidmegaly, JVD difficult to assess  Skin:  Warm and dry; no rash or lesions  Heart: Regular, normal apex, S1 and S2 normal, no M/G/R  Lungs:  Normal respiratory effort; diminished  Abdomen: soft, non tender, + bowel sounds  Extremities:  +BLE edema and redness  Neuro: alert and oriented, moves legs and arms equally, normal mood and affect    Data Reviewed:    Echo Component Value Date    WBC 8.4 08/23/2021    RBC 4.13 08/23/2021    HGB 11.9 08/23/2021    HCT 35.8 08/23/2021    MCV 86.8 08/23/2021    RDW 17.9 08/23/2021     08/23/2021     BNP:    Lab Results   Component Value Date    PROBNP 1,593 08/26/2021    PROBNP 1,446 08/23/2021    PROBNP 234 04/29/2018    PROBNP 496 04/27/2018    PROBNP 1,093 04/25/2018     Fasting Lipid Panel:    Lab Results   Component Value Date    CHOL 104 08/24/2021    HDL 33 08/24/2021    HDL 31 01/11/2012    TRIG 110 08/24/2021     Cardiac Enzymes:  CK/MbTroponin  Lab Results   Component Value Date    CKTOTAL 149 11/16/2012    TROPONINI 0.03 08/24/2021     PT/ INR   Lab Results   Component Value Date    INR 1.22 09/17/2015    INR 1.18 11/24/2014    INR 1.10 01/05/2013    PROTIME 13.3 09/17/2015    PROTIME 12.9 11/24/2014    PROTIME 12.5 01/05/2013     PTT No results found for: PTT   Lab Results   Component Value Date    MG 2.10 08/28/2021      Lab Results   Component Value Date    TSH 0.64 08/24/2021     All labs and imaging reviewed today    Assessment:  Acute on chronic diastolic CHF: ongoing, -4.3R and -7lbs overnight  CAD: patent grafts on angiogram 2012; s/p CABG 2011  Aortic stenosis: moderate  Atrial tachycardia: ongoing but stable, evaluated by EP, no AF/aflutter noted  Pulmonary HTN: severe  HTN: stable  HLD  HENNA  CKD  DM  Obesity    Plan:   1. Continue lV lasix 60 mg BID as renal function and BP allow  2. Continue aspirin, statin, carvedilol, imdur, spironolactone   3. Daily weights, strict I/Os  4.  Monitor renal function    PATR[address]  [phone]

## 2021-08-28 NOTE — PLAN OF CARE
Problem: Falls - Risk of:  Goal: Will remain free from falls  Description: Will remain free from falls  Outcome: Ongoing  Goal: Absence of physical injury  Description: Absence of physical injury  Outcome: Ongoing     Problem: Skin Integrity:  Goal: Will show no infection signs and symptoms  Description: Will show no infection signs and symptoms  8/28/2021 1438 by Nadia Walsh RN  Outcome: Ongoing     Problem: Skin Integrity:  Goal: Absence of new skin breakdown  Description: Absence of new skin breakdown  Outcome: Ongoing     Problem: Pain:  Goal: Pain level will decrease  Description: Pain level will decrease  8/28/2021 1438 by Nadia Walsh RN  Outcome: Ongoing     Problem: Pain:  Goal: Control of acute pain  Description: Control of acute pain  Outcome: Ongoing     Problem: Pain:  Goal: Control of chronic pain  Description: Control of chronic pain  Outcome: Ongoing     Problem: Metabolic:  Goal: Ability to maintain appropriate glucose levels will improve  Description: Ability to maintain appropriate glucose levels will improve  8/28/2021 1438 by Nadia Walsh RN  Outcome: Ongoing     Problem: OXYGENATION/RESPIRATORY FUNCTION  Goal: Patient will maintain patent airway  8/28/2021 1438 by Nadia Walsh RN  Outcome: Ongoing     Problem: OXYGENATION/RESPIRATORY FUNCTION  Goal: Patient will achieve/maintain normal respiratory rate/effort  Description: Respiratory rate and effort will be within normal limits for the patient  Outcome: Ongoing

## 2021-08-28 NOTE — PROGRESS NOTES
Pt refused 2100 dose of scheduled percocet. Around [phone], pt called out stating he was in severe leg pain and requested his percocet. RN informed pt that since he refused scheduled dose at 2100, he did not have a dose due until 0900 and provided heat packs to help manage pain. Perfect served cross cover to request one time dose of pain medication. RN administered one time dose of percocet as ordered. Pt states he is upset that RN did not wake him up throughout the night to continue to offer his 2100 dose of percocet. RN educated pt that since he refused the medication, it is not normal policy for RN to continue to offer the medication. Pt still stating he is upset and frustrated at how his pain is being managed in the hospital. RN informed pt that next percocet is due at 0900. Will continue to monitor.

## 2021-08-28 NOTE — PROGRESS NOTES
Hospitalist Progress Note      PCP: Nirmal Garland MD    Date of Admission: 8/23/2021    Chief Complaint: Chest pain     Hospital Course:   78 y.o. male who presented to [facility] with above complaints. Pt with PMH of CAD s/p CABG, diastolic CHF, CKD, HTN, DM 2, HLD, sleep apnea, pulmonary hypertension presented to the ED today with complaints of chest pain. Pt reports retrosternal chest pain, burning in nature, feels like GERD, took PPI without much relief. Pain started few hours prior to arrival. Very minimal shortness of breath associated with this. Denies any nausea vomiting or diaphoresis. No cough. No subjective fevers chills or rigors. No abdominal pain. Pt reportedly took 4 baby aspirin prior to arrival. Did not take any NTG. Pt wears 4 L oxygen at home at baseline. Denied any palpitations or dizziness. He is a prior smoker, but currently denies any active smoking. He reports he has chronic leg wounds that are wrapped in Ace wrap, recently got it checked on Friday. Some mild chronic leg swelling. Corby Susy reports he sleeps in a recliner all the time. Pt reports he is weight has been increasing the past few weeks, he reports his nephrologist put him back on torsemide and metolazone which she has been taking. He reports he usually weighs 285 pounds, currently at 297 pounds. Subjective:   Pt is on 3 LPM. Afebrile. VSS. No dyspnea at rest. No chest pain. Documented urine output appears incomplete. Weight is 301 lbs today.     Medications:  Reviewed    Infusion Medications    sodium chloride      dextrose       Scheduled Medications    furosemide  60 mg IntraVENous BID    insulin lispro  10 Units Subcutaneous TID WC    carvedilol  12.5 mg Oral BID     potassium chloride  20 mEq Oral Daily    insulin glargine  40 Units Subcutaneous Nightly    sodium chloride flush  5-40 mL IntraVENous 2 times per day    heparin (porcine)  5,000 Units Subcutaneous 3 times per day    allopurinol  300 mg Oral Daily  aspirin  81 mg Oral Daily    colchicine  0.6 mg Oral Daily    ferrous sulfate  325 mg Oral TID     isosorbide mononitrate  30 mg Oral Daily    oxyCODONE-acetaminophen  1 tablet Oral 4x Daily    polyethylene glycol  17 g Oral Daily    pregabalin  50 mg Oral Dinner    senna  1 tablet Oral BID    metOLazone  2.5 mg Oral Daily    atorvastatin  40 mg Oral Nightly    spironolactone  25 mg Oral Daily    insulin lispro  0-18 Units Subcutaneous TID     insulin lispro  0-9 Units Subcutaneous Nightly     PRN Meds: sodium chloride flush, sodium chloride, ondansetron **OR** ondansetron, polyethylene glycol, acetaminophen **OR** acetaminophen, perflutren lipid microspheres, nitroGLYCERIN, morphine, glucose, dextrose, glucagon (rDNA), dextrose      Intake/Output Summary (Last 24 hours) at 8/28/2021 1606  Last data filed at 8/28/2021 1315  Gross per 24 hour   Intake 342 ml   Output 700 ml   Net -358 ml       Physical Exam Performed:    /64   Pulse 88   Temp 98 °F (36.7 °C) (Oral)   Resp 16   Ht 5' 9\" (1.753 m)   Wt (!) 301 lb 12.8 oz (136.9 kg)   SpO2 90%   BMI 44.57 kg/m²     General appearance: Obese male in no apparent distress, appears stated age and cooperative. HEENT: Pupils equal, round, and reactive to light. Conjunctivae/corneas clear. Neck: Supple, with full range of motion. No jugular venous distention. Trachea midline. Respiratory:  Normal respiratory effort. Clear to auscultation, bilaterally without Rales/Wheezes/Rhonchi. Cardiovascular: Regular rate and rhythm with normal S1/S2 without murmurs, rubs or gallops. Abdomen: Soft, non-tender, non-distended with normal bowel sounds. Musculoskeletal: No clubbing, cyanosis. ++BLE edema. Skin: Skin color, texture, turgor normal.  No rashes or lesions. Neurologic:  Neurovascularly intact without any focal sensory/motor deficits.  Cranial nerves: II-XII intact, grossly non-focal.  Psychiatric: Alert and oriented, thought content appropriate, normal insight  Capillary Refill: Brisk,3 seconds, normal   Peripheral Pulses: +2 palpable, equal bilaterally       Labs:   Recent Labs     08/26/21  0714 08/27/21  0550 08/28/21  0802    139 137   K 4.3 4.4 4.1   CL 97* 97* 95*   CO2 32 33* 30   BUN 46* 45* 43*   CREATININE 1.3 1.2 1.3   CALCIUM 9.6 10.3 9.9     Urinalysis:      Lab Results   Component Value Date    NITRU Negative 07/08/2019    WBCUA 0-2 11/05/2013    BACTERIA Rare 11/05/2013    RBCUA 0-2 11/05/2013    BLOODU Negative 07/08/2019    SPECGRAV 1.010 07/08/2019    GLUCOSEU Negative 07/08/2019    GLUCOSEU NEGATIVE 02/29/2012       Radiology:  XR CHEST PORTABLE   Final Result   Pulmonary vascular congestion/interstitial edema. Stable cardiomegaly. Assessment/Plan:    Active Hospital Problems    Diagnosis     Cor pulmonale, chronic (HCC) [I27.81]     Pulmonary hypertension due to alveolar hypoventilation disorder (HCC) [I27.23]     Nonrheumatic aortic valve stenosis [I35.0]     Chest pain [R07.9]     Acute diastolic congestive heart failure (HCC) [I50.31]     Severe obstructive sleep apnea [G47.33]     HLD (hyperlipidemia) [E78.5]     Morbid obesity due to excess calories ([facility] [address] [E66.01]     S/P CABG x 3 [Z95.1]     Essential hypertension [I10]     DM (diabetes mellitus) ([facility] [address] [E11.9]     Coronary artery disease involving native coronary artery of native heart without angina pectoris [I25.10]        Acute diastolic CHF:  - BNP 6900, CXR showing pulmonary edema, vascular congestion.  - Echo 4/2018 showed LVEF 55 to 60%, grade 2 DD. Updated ECHO showed normal LVEF of 55-60%, elevated LVDFP, mod AS, RVSF mod reduced, mild-mod TR, severe PHTN. - Defer diuresis to Cardiology. Currently on lasix 60 mg IV BID, aldactone and metolazone. - Strict I's/O, daily weights and daily BMP with diuresis. Acute on chronic hypoxic respiratory failure:  - Secondary to above. - Wean supplemental O2 as tolerated.  He wears 3-4 LPM at night. Currently requiring 2 LPM during the day.      Chest pain w/ elevated troponin in pt with CAD s/p CABG x3  - Troponin 0.04 --> 0.03. Likely due to CHF.    - Last cath in 2012 showed widely patent grafts. - Cardiology consulted. - Monitor pt on telemetry. - Continue aspirin, statin, carvedilol, imdur.  - ECHO as noted above. HTN:  - Controlled, continue home medication regimen.     CKD stage 3:  - Stable, monitor. Followed by Dr. Ayad Oconnell outpatient.     DM2:  - Unontrolled, A1c 8.5  - Continue basal bolus insulin -- monitor and adjust as needed. - Carb-control diet.      HLD:  - Continue statin. Severe HENNA:  -Continue home BiPAP.     Morbid obesity:  - With Body mass index is 44.57 kg/m². Complicating assessment and treatment. Placing patient at risk for multiple co-morbidities as well as early death and contributing to the patient's presentation. Counseled on weight loss.        DVT Prophylaxis: SQ heparin   Diet: ADULT DIET; Regular; 5 carb choices (75 gm/meal);  Low Sodium (2 gm); 1800 ml  Code Status: Full Code    PT/OT Eval Status: ordered with recs for Rohit Aguayo PT/OT    Dispo - ~2 days pending clinical course     PATRICK Mustafa - CNP

## 2021-08-29 LAB
ANION GAP SERPL CALCULATED.3IONS-SCNC: 9 MMOL/L (ref 3–16)
BUN BLDV-MCNC: 44 MG/DL (ref 7–20)
CALCIUM SERPL-MCNC: 10 MG/DL (ref 8.3–10.6)
CHLORIDE BLD-SCNC: 97 MMOL/L (ref 99–110)
CO2: 32 MMOL/L (ref 21–32)
CREAT SERPL-MCNC: 1.3 MG/DL (ref 0.8–1.3)
GFR AFRICAN AMERICAN: >60
GFR NON-AFRICAN AMERICAN: 55
GLUCOSE BLD-MCNC: 166 MG/DL (ref 70–99)
GLUCOSE BLD-MCNC: 170 MG/DL (ref 70–99)
GLUCOSE BLD-MCNC: 177 MG/DL (ref 70–99)
GLUCOSE BLD-MCNC: 197 MG/DL (ref 70–99)
GLUCOSE BLD-MCNC: 288 MG/DL (ref 70–99)
PERFORMED ON: ABNORMAL
POTASSIUM SERPL-SCNC: 4.4 MMOL/L (ref 3.5–5.1)
PRO-BNP: 1000 PG/ML (ref 0–124)
SODIUM BLD-SCNC: 138 MMOL/L (ref 136–145)

## 2021-08-29 PROCEDURE — 80048 BASIC METABOLIC PNL TOTAL CA: CPT

## 2021-08-29 PROCEDURE — 6360000002 HC RX W HCPCS: Performed by: NURSE PRACTITIONER

## 2021-08-29 PROCEDURE — 6360000002 HC RX W HCPCS: Performed by: INTERNAL MEDICINE

## 2021-08-29 PROCEDURE — 1200000000 HC SEMI PRIVATE

## 2021-08-29 PROCEDURE — 6370000000 HC RX 637 (ALT 250 FOR IP): Performed by: INTERNAL MEDICINE

## 2021-08-29 PROCEDURE — 36415 COLL VENOUS BLD VENIPUNCTURE: CPT

## 2021-08-29 PROCEDURE — 94761 N-INVAS EAR/PLS OXIMETRY MLT: CPT

## 2021-08-29 PROCEDURE — 99232 SBSQ HOSP IP/OBS MODERATE 35: CPT | Performed by: NURSE PRACTITIONER

## 2021-08-29 PROCEDURE — 2700000000 HC OXYGEN THERAPY PER DAY

## 2021-08-29 PROCEDURE — 2580000003 HC RX 258: Performed by: INTERNAL MEDICINE

## 2021-08-29 PROCEDURE — 83880 ASSAY OF NATRIURETIC PEPTIDE: CPT

## 2021-08-29 PROCEDURE — 6370000000 HC RX 637 (ALT 250 FOR IP): Performed by: REGISTERED NURSE

## 2021-08-29 RX ADMIN — FERROUS SULFATE TAB 325 MG (65 MG ELEMENTAL FE) 325 MG: 325 (65 FE) TAB at 12:40

## 2021-08-29 RX ADMIN — INSULIN LISPRO 2 UNITS: 100 INJECTION, SOLUTION INTRAVENOUS; SUBCUTANEOUS at 21:27

## 2021-08-29 RX ADMIN — ALLOPURINOL 300 MG: 300 TABLET ORAL at 09:40

## 2021-08-29 RX ADMIN — OXYCODONE AND ACETAMINOPHEN 1 TABLET: 5; 325 TABLET ORAL at 17:19

## 2021-08-29 RX ADMIN — ISOSORBIDE MONONITRATE 30 MG: 30 TABLET, EXTENDED RELEASE ORAL at 09:40

## 2021-08-29 RX ADMIN — OXYCODONE AND ACETAMINOPHEN 1 TABLET: 5; 325 TABLET ORAL at 21:15

## 2021-08-29 RX ADMIN — INSULIN LISPRO 3 UNITS: 100 INJECTION, SOLUTION INTRAVENOUS; SUBCUTANEOUS at 09:44

## 2021-08-29 RX ADMIN — OXYCODONE AND ACETAMINOPHEN 1 TABLET: 5; 325 TABLET ORAL at 09:31

## 2021-08-29 RX ADMIN — CARVEDILOL 12.5 MG: 6.25 TABLET, FILM COATED ORAL at 09:40

## 2021-08-29 RX ADMIN — CARVEDILOL 12.5 MG: 6.25 TABLET, FILM COATED ORAL at 17:18

## 2021-08-29 RX ADMIN — COLCHICINE 0.6 MG: 0.6 TABLET ORAL at 09:40

## 2021-08-29 RX ADMIN — HEPARIN SODIUM 5000 UNITS: 5000 INJECTION INTRAVENOUS; SUBCUTANEOUS at 14:23

## 2021-08-29 RX ADMIN — ATORVASTATIN CALCIUM 40 MG: 40 TABLET, FILM COATED ORAL at 21:15

## 2021-08-29 RX ADMIN — OXYCODONE AND ACETAMINOPHEN 1 TABLET: 5; 325 TABLET ORAL at 12:40

## 2021-08-29 RX ADMIN — INSULIN LISPRO 10 UNITS: 100 INJECTION, SOLUTION INTRAVENOUS; SUBCUTANEOUS at 17:20

## 2021-08-29 RX ADMIN — SPIRONOLACTONE 25 MG: 25 TABLET, FILM COATED ORAL at 09:40

## 2021-08-29 RX ADMIN — FERROUS SULFATE TAB 325 MG (65 MG ELEMENTAL FE) 325 MG: 325 (65 FE) TAB at 09:31

## 2021-08-29 RX ADMIN — HEPARIN SODIUM 5000 UNITS: 5000 INJECTION INTRAVENOUS; SUBCUTANEOUS at 21:27

## 2021-08-29 RX ADMIN — FUROSEMIDE 60 MG: 10 INJECTION, SOLUTION INTRAMUSCULAR; INTRAVENOUS at 19:03

## 2021-08-29 RX ADMIN — PREGABALIN 50 MG: 25 CAPSULE ORAL at 17:18

## 2021-08-29 RX ADMIN — FUROSEMIDE 60 MG: 10 INJECTION, SOLUTION INTRAMUSCULAR; INTRAVENOUS at 09:41

## 2021-08-29 RX ADMIN — ASPIRIN 81 MG 81 MG: 81 TABLET ORAL at 09:31

## 2021-08-29 RX ADMIN — SENNOSIDES 8.6 MG: 8.6 TABLET, FILM COATED ORAL at 09:31

## 2021-08-29 RX ADMIN — POTASSIUM CHLORIDE 20 MEQ: 20 TABLET, EXTENDED RELEASE ORAL at 09:40

## 2021-08-29 RX ADMIN — INSULIN GLARGINE 40 UNITS: 100 INJECTION, SOLUTION SUBCUTANEOUS at 21:28

## 2021-08-29 RX ADMIN — METOLAZONE 2.5 MG: 2.5 TABLET ORAL at 09:40

## 2021-08-29 RX ADMIN — SENNOSIDES 8.6 MG: 8.6 TABLET, FILM COATED ORAL at 21:15

## 2021-08-29 RX ADMIN — ACETAMINOPHEN 650 MG: 325 TABLET ORAL at 04:08

## 2021-08-29 RX ADMIN — INSULIN LISPRO 9 UNITS: 100 INJECTION, SOLUTION INTRAVENOUS; SUBCUTANEOUS at 12:41

## 2021-08-29 RX ADMIN — INSULIN LISPRO 10 UNITS: 100 INJECTION, SOLUTION INTRAVENOUS; SUBCUTANEOUS at 09:44

## 2021-08-29 RX ADMIN — SODIUM CHLORIDE, PRESERVATIVE FREE 10 ML: 5 INJECTION INTRAVENOUS at 21:15

## 2021-08-29 RX ADMIN — INSULIN LISPRO 10 UNITS: 100 INJECTION, SOLUTION INTRAVENOUS; SUBCUTANEOUS at 12:41

## 2021-08-29 RX ADMIN — SODIUM CHLORIDE, PRESERVATIVE FREE 10 ML: 5 INJECTION INTRAVENOUS at 09:41

## 2021-08-29 RX ADMIN — INSULIN LISPRO 3 UNITS: 100 INJECTION, SOLUTION INTRAVENOUS; SUBCUTANEOUS at 17:20

## 2021-08-29 RX ADMIN — POLYETHYLENE GLYCOL 3350 17 G: 17 POWDER, FOR SOLUTION ORAL at 09:40

## 2021-08-29 RX ADMIN — FERROUS SULFATE TAB 325 MG (65 MG ELEMENTAL FE) 325 MG: 325 (65 FE) TAB at 17:18

## 2021-08-29 RX ADMIN — HEPARIN SODIUM 5000 UNITS: 5000 INJECTION INTRAVENOUS; SUBCUTANEOUS at 06:12

## 2021-08-29 ASSESSMENT — PAIN SCALES - GENERAL
PAINLEVEL_OUTOF10: 8
PAINLEVEL_OUTOF10: 7
PAINLEVEL_OUTOF10: 7
PAINLEVEL_OUTOF10: 8
PAINLEVEL_OUTOF10: 7
PAINLEVEL_OUTOF10: 7
PAINLEVEL_OUTOF10: 8

## 2021-08-29 ASSESSMENT — PAIN DESCRIPTION - LOCATION
LOCATION: FOOT;LEG

## 2021-08-29 ASSESSMENT — PAIN DESCRIPTION - DESCRIPTORS
DESCRIPTORS: ACHING;THROBBING

## 2021-08-29 ASSESSMENT — ENCOUNTER SYMPTOMS
SHORTNESS OF BREATH: 1
GASTROINTESTINAL NEGATIVE: 1

## 2021-08-29 ASSESSMENT — PAIN DESCRIPTION - ORIENTATION
ORIENTATION: RIGHT;LEFT

## 2021-08-29 ASSESSMENT — PAIN DESCRIPTION - PAIN TYPE
TYPE: CHRONIC PAIN;NEUROPATHIC PAIN
TYPE: ACUTE PAIN;NEUROPATHIC PAIN
TYPE: CHRONIC PAIN
TYPE: CHRONIC PAIN
TYPE: NEUROPATHIC PAIN;CHRONIC PAIN

## 2021-08-29 NOTE — PLAN OF CARE
Protect patient from fall injury by keeping bed in low position, use of non skid socks and bed alarm system. Keep belongings and call light with reach at all times and following fall protocol. Observe skin for break down. Monitor skin folds, pad O2 cannula on ears   Keep  heels off bed. Encourage patient to ask for pain medication before pain is above a 5 on the 1/10 pain scale. Medicate before PT or increased activity. Use alternatives such as ice (if ordered) or distraction as often as possible to minimize  need for medication. Monitor Respiratory status, breath sounds, Pulse Ox, titrate 02 as needed. Strict I&O.

## 2021-08-29 NOTE — PROGRESS NOTES
[address]  Cardiology  Progress Note    Admission date:  2021    Reason for follow up visit: CHF    HPI/CC: Karsten Ramirez is a 78 y.o. male who was admitted 2021 for shortness of breath and treated for CHF. Echo showed EF 55-60%, moderate AS, severe pulmonary HTN. Rhythm has been SR with AT. Subjective: States edema and shortness of breath is improving, on 2L NC which he states he is on at home. No chest pain. Vitals:  Blood pressure (!) 142/79, pulse 107, temperature 97.7 °F (36.5 °C), temperature source Oral, resp. rate 18, height 5' 9\" (1.753 m), weight 298 lb 12.8 oz (135.5 kg), SpO2 93 %.   Temp  Av.9 °F (36.6 °C)  Min: 97.7 °F (36.5 °C)  Max: 98.1 °F (36.7 °C)  Pulse  Av.6  Min: 76  Max: 107  BP  Min: 108/64  Max: 151/74  SpO2  Av.3 %  Min: 90 %  Max: 96 %    24 hour I/O    Intake/Output Summary (Last 24 hours) at 2021 1233  Last data filed at 2021 0900  Gross per 24 hour   Intake 582 ml   Output 1150 ml   Net -568 ml     Current Facility-Administered Medications   Medication Dose Route Frequency Provider Last Rate Last Admin    furosemide (LASIX) injection 60 mg  60 mg IntraVENous BID PATRICK Shankar CNP   60 mg at 21 0941    insulin lispro (HUMALOG) injection vial 10 Units  10 Units Subcutaneous TID  PATRICK Mustafa CNP   10 Units at 21 0944    carvedilol (COREG) tablet 12.5 mg  12.5 mg Oral BID  Sandor Velasquez MD   12.5 mg at 21 0940    potassium chloride (KLOR-CON M) extended release tablet 20 mEq  20 mEq Oral Daily Sandor Velasquez MD   20 mEq at 21 0940    insulin glargine (LANTUS) injection vial 40 Units  40 Units Subcutaneous Nightly [address] PATRICK Sweet CNP   40 Units at 21 2139    sodium chloride flush 0.9 % injection 5-40 mL  5-40 mL IntraVENous 2 times per day Jess Matthew MD   10 mL at 21 0941    sodium chloride flush 0.9 % injection 5-40 mL  5-40 mL IntraVENous PRN Daily Edd Mar MD   30 mg at 08/29/21 0940    oxyCODONE-acetaminophen (PERCOCET) 5-325 MG per tablet 1 tablet  1 tablet Oral 4x Daily Edd Mar MD   1 tablet at 08/29/21 0931    polyethylene glycol (GLYCOLAX) packet 17 g  17 g Oral Daily Edd Mar MD   17 g at 08/29/21 0940    pregabalin (LYRICA) capsule 50 mg  50 mg Oral Dinner Edd Mar MD   50 mg at 08/28/21 1707    senna (SENOKOT) tablet 8.6 mg  1 tablet Oral BID Edd Mar MD   8.6 mg at 08/29/21 0931    metOLazone (ZAROXOLYN) tablet 2.5 mg  2.5 mg Oral Daily Edd Mar MD   2.5 mg at 08/29/21 0940    atorvastatin (LIPITOR) tablet 40 mg  40 mg Oral Nightly Ness Kellogg MD   40 mg at 08/28/21 2132    spironolactone (ALDACTONE) tablet 25 mg  25 mg Oral Daily Ness Kellogg MD   25 mg at 08/29/21 0940    insulin lispro (HUMALOG) injection vial 0-18 Units  0-18 Units Subcutaneous TID R [address] Haley Radha Goel, APRN - CNP   3 Units at 08/29/21 0944    insulin lispro (HUMALOG) injection vial 0-9 Units  0-9 Units Subcutaneous Nightly [address] [facility] Kee, APRN - CNP   3 Units at 08/28/21 2135     Review of Systems   Constitutional: Negative. Respiratory: Positive for shortness of breath. Cardiovascular: Positive for leg swelling. Negative for chest pain. Gastrointestinal: Negative. Neurological: Negative.       Objective:     Telemetry monitor: SR vs AT    Physical Exam:  Constitutional:  Comfortable and alert, NAD, appears older than stated age  Eyes: PERRL, sclera nonicteric  Neck:  Supple, no masses, no thyroidmegaly, JVD difficult to assess  Skin:  Warm and dry; no rash or lesions  Heart: Regular, normal apex, S1 and S2 normal, no M/G/R  Lungs:  Normal respiratory effort; diminished  Abdomen: soft, non tender, + bowel sounds  Extremities:  +BLE edema and redness  Neuro: alert and oriented, moves legs and arms equally, normal mood and affect    Data Reviewed:    Echo 8/25/2021: Technically difficult examination. Normal left ventricular systolic function with ejection fraction of 55-60%. Paradoxic septal motion   Mild concentric left ventricular hypertrophy. Left ventricular diastolic filling pressure is elevated. Compared to previous study from 4- no changes noted in left   ventricular function. Mild mitral and pulmonic regurgitation. Bi-atrial enlargement. Moderate aortic stenosis, difficult to fully evaluate on this study   The right ventricle is moderately enlarged. Right ventricular systolic function is moderately reduced . Mild to moderate tricuspid regurgitation. Systolic pulmonic artery pressure (SPAP) is estimated at 65 mmHg consistent   with severe pulmonary hypertension (Right atrial pressure of 15 mmHg),   however difficult to accurately measure pressures on this study. The right atrium is moderately dilated. Coronary angiogram 2012:  1. The right coronary artery proximally has an 80% lesion. Distally there  is a 90% lesion. 2.  The left main artery has luminal irregularities. The LAD is occluded  proximally. 3.  The circumflex artery has 2 large marginal arteries that have diffuse 40%  lesion. 4.  The KAVYA to the distal LAD is widely patent. 5.  The SVG to the first diagonal is widely patent. 6.  The SVG to the RPL is widely patent. IMPRESSION:   1. Normal left ventricular function with normal cardiac output. 2.  Severely elevated left ventricular end diastolic pressure. 3.  Normal cardiac output. 4.  Severe pulmonary venous hypertension. PLAN:    1. Continue diuresis and afterload reduction. 2.  Salt restriction. 3.  Evaluation with a sleep study.     Lab Reviewed:     Renal Profile:  Lab Results   Component Value Date    CREATININE 1.3 08/29/2021    BUN 44 08/29/2021     08/29/2021    K 4.4 08/29/2021    K 4.0 08/23/2021    CL 97 08/29/2021    CO2 32 08/29/2021     CBC:    Lab Results   Component Value Date    WBC 8.4 08/23/2021    RBC 4.13 08/23/2021    HGB 11.9 08/23/2021    HCT 35.8 08/23/2021    MCV 86.8 08/23/2021    RDW 17.9 08/23/2021     08/23/2021     BNP:    Lab Results   Component Value Date    PROBNP 1,000 08/29/2021    PROBNP 1,593 08/26/2021    PROBNP 1,446 08/23/2021    PROBNP 234 04/29/2018    PROBNP 496 04/27/2018     Fasting Lipid Panel:    Lab Results   Component Value Date    CHOL 104 08/24/2021    HDL 33 08/24/2021    HDL 31 01/11/2012    TRIG 110 08/24/2021     Cardiac Enzymes:  CK/MbTroponin  Lab Results   Component Value Date    CKTOTAL 149 11/16/2012    TROPONINI 0.03 08/24/2021     PT/ INR   Lab Results   Component Value Date    INR 1.22 09/17/2015    INR 1.18 11/24/2014    INR 1.10 01/05/2013    PROTIME 13.3 09/17/2015    PROTIME 12.9 11/24/2014    PROTIME 12.5 01/05/2013     PTT No results found for: PTT   Lab Results   Component Value Date    MG 2.10 08/28/2021      Lab Results   Component Value Date    TSH 0.64 08/24/2021     All labs and imaging reviewed today    Assessment:  Acute on chronic diastolic CHF: ongoing, -3.4K and -13 lbs   CAD: patent grafts on angiogram 2012; s/p CABG 2011  Aortic stenosis: moderate  Atrial tachycardia: ongoing but stable, evaluated by EP, no AF/aflutter noted  Pulmonary HTN: severe  HTN: stable  HLD  HENNA  CKD  DM  Obesity    Plan:   1. Continue lV lasix 60 mg BID as renal function and BP allow  2. Continue aspirin, statin, carvedilol, imdur, spironolactone, metolazone   3. Daily weights, strict I/Os  4. Monitor renal function  5.  Hopeful discharge in 1-2 days pending diuresis    [address]  [phone]

## 2021-08-29 NOTE — PROGRESS NOTES
Hospitalist Progress Note      PCP: Shweta Garcia MD    Date of Admission: 8/23/2021    Chief Complaint: Chest pain     Hospital Course:   78 y.o. male who presented to [facility] with above complaints. Pt with PMH of CAD s/p CABG, diastolic CHF, CKD, HTN, DM 2, HLD, sleep apnea, pulmonary hypertension presented to the ED today with complaints of chest pain. Pt reports retrosternal chest pain, burning in nature, feels like GERD, took PPI without much relief. Pain started few hours prior to arrival. Very minimal shortness of breath associated with this. Denies any nausea vomiting or diaphoresis. No cough. No subjective fevers chills or rigors. No abdominal pain. Pt reportedly took 4 baby aspirin prior to arrival. Did not take any NTG. Pt wears 4 L oxygen at home at baseline. Denied any palpitations or dizziness. He is a prior smoker, but currently denies any active smoking. He reports he has chronic leg wounds that are wrapped in Ace wrap, recently got it checked on Friday. Some mild chronic leg swelling. Jer Michele reports he sleeps in a recliner all the time. Pt reports he is weight has been increasing the past few weeks, he reports his nephrologist put him back on torsemide and metolazone which she has been taking. He reports he usually weighs 285 pounds, currently at 297 pounds. Subjective:   Pt is on 3 LPM. Afebrile. VSS. No dyspnea at rest. No chest pain. Weight is 298 lbs today.     Medications:  Reviewed    Infusion Medications    sodium chloride      dextrose       Scheduled Medications    furosemide  60 mg IntraVENous BID    insulin lispro  10 Units Subcutaneous TID WC    carvedilol  12.5 mg Oral BID WC    potassium chloride  20 mEq Oral Daily    insulin glargine  40 Units Subcutaneous Nightly    sodium chloride flush  5-40 mL IntraVENous 2 times per day    heparin (porcine)  5,000 Units Subcutaneous 3 times per day    allopurinol  300 mg Oral Daily    aspirin  81 mg Oral Daily    colchicine 0.6 mg Oral Daily    ferrous sulfate  325 mg Oral TID     isosorbide mononitrate  30 mg Oral Daily    oxyCODONE-acetaminophen  1 tablet Oral 4x Daily    polyethylene glycol  17 g Oral Daily    pregabalin  50 mg Oral Dinner    senna  1 tablet Oral BID    metOLazone  2.5 mg Oral Daily    atorvastatin  40 mg Oral Nightly    spironolactone  25 mg Oral Daily    insulin lispro  0-18 Units Subcutaneous TID     insulin lispro  0-9 Units Subcutaneous Nightly     PRN Meds: sodium chloride flush, sodium chloride, ondansetron **OR** ondansetron, polyethylene glycol, acetaminophen **OR** acetaminophen, perflutren lipid microspheres, nitroGLYCERIN, morphine, glucose, dextrose, glucagon (rDNA), dextrose      Intake/Output Summary (Last 24 hours) at 8/29/2021 1515  Last data filed at 8/29/2021 0900  Gross per 24 hour   Intake 360 ml   Output 1150 ml   Net -790 ml       Physical Exam Performed:    BP (!) 110/53   Pulse 91   Temp 97.5 °F (36.4 °C) (Oral)   Resp 18   Ht 5' 9\" (1.753 m)   Wt 298 lb 12.8 oz (135.5 kg)   SpO2 (!) 80% Comment: put o2 back on patient  BMI 44.13 kg/m²     General appearance: Obese male in no apparent distress, appears stated age and cooperative. HEENT: Pupils equal, round, and reactive to light. Conjunctivae/corneas clear. Neck: Supple, with full range of motion. No jugular venous distention. Trachea midline. Respiratory:  Normal respiratory effort. Clear to auscultation, bilaterally without Rales/Wheezes/Rhonchi. Cardiovascular: Regular rate and rhythm with normal S1/S2 without murmurs, rubs or gallops. Abdomen: Soft, non-tender, non-distended with normal bowel sounds. Musculoskeletal: No clubbing, cyanosis. ++BLE edema. Skin: Skin color, texture, turgor normal.  No rashes or lesions. Neurologic:  Neurovascularly intact without any focal sensory/motor deficits.  Cranial nerves: II-XII intact, grossly non-focal.  Psychiatric: Alert and oriented, thought content appropriate, normal insight  Capillary Refill: Brisk,3 seconds, normal   Peripheral Pulses: +2 palpable, equal bilaterally       Labs:   Recent Labs     08/27/21  0550 08/28/21  0802 08/29/21  0637    137 138   K 4.4 4.1 4.4   CL 97* 95* 97*   CO2 33* 30 32   BUN 45* 43* 44*   CREATININE 1.2 1.3 1.3   CALCIUM 10.3 9.9 10.0     Urinalysis:      Lab Results   Component Value Date    NITRU Negative 07/08/2019    WBCUA 0-2 11/05/2013    BACTERIA Rare 11/05/2013    RBCUA 0-2 11/05/2013    BLOODU Negative 07/08/2019    SPECGRAV 1.010 07/08/2019    GLUCOSEU Negative 07/08/2019    GLUCOSEU NEGATIVE 02/29/2012       Radiology:  XR CHEST PORTABLE   Final Result   Pulmonary vascular congestion/interstitial edema. Stable cardiomegaly. Assessment/Plan:    Active Hospital Problems    Diagnosis     Cor pulmonale, chronic (HCC) [I27.81]     Pulmonary hypertension due to alveolar hypoventilation disorder (HCC) [I27.23]     Nonrheumatic aortic valve stenosis [I35.0]     Chest pain [R07.9]     Acute diastolic congestive heart failure (HCC) [I50.31]     Severe obstructive sleep apnea [G47.33]     HLD (hyperlipidemia) [E78.5]     Morbid obesity due to excess calories ([facility] [address] [E66.01]     S/P CABG x 3 [Z95.1]     Essential hypertension [I10]     DM (diabetes mellitus) ([facility] [address] [E11.9]     Coronary artery disease involving native coronary artery of native heart without angina pectoris [I25.10]        Acute diastolic CHF:  - BNP 5517, CXR showing pulmonary edema, vascular congestion.  - Echo 4/2018 showed LVEF 55 to 60%, grade 2 DD. Updated ECHO showed normal LVEF of 55-60%, elevated LVDFP, mod AS, RVSF mod reduced, mild-mod TR, severe PHTN. - Defer diuresis to Cardiology. Currently on lasix 60 mg IV BID, aldactone and metolazone. - Strict I's/O, daily weights and daily BMP with diuresis. Admission weigh 311 lbs -> 298 lbs currently. Acute on chronic hypoxic respiratory failure:  - Secondary to above.    - Wean supplemental O2 as tolerated. He wears 3-4 LPM at night. Currently requiring 2 LPM during the day.      Chest pain w/ elevated troponin in pt with CAD s/p CABG x3  - Troponin 0.04 --> 0.03. Likely due to CHF.    - Last cath in 2012 showed widely patent grafts. - Cardiology consulted. - Monitor pt on telemetry. - Continue aspirin, statin, carvedilol, imdur.  - ECHO as noted above. HTN:  - Controlled, continue home medication regimen.     CKD stage 3:  - Stable, monitor. Followed by Dr. Karey Cervantes outpatient.     DM2:  - Unontrolled, A1c 8.5  - Continue basal bolus insulin -- monitor and adjust as needed. - Carb-control diet.      HLD:  - Continue statin. Severe HENNA:  -Continue home BiPAP.     Morbid obesity:  - With Body mass index is 44.13 kg/m². Complicating assessment and treatment. Placing patient at risk for multiple co-morbidities as well as early death and contributing to the patient's presentation. Counseled on weight loss.        DVT Prophylaxis: SQ heparin   Diet: ADULT DIET; Regular; 5 carb choices (75 gm/meal);  Low Sodium (2 gm); 1800 ml  Code Status: Full Code    PT/OT Eval Status: ordered with recs for Rohit Aguayo PT/OT    Dispo - ~1-2 days pending clinical course     Haley Conde, APRN - CNP

## 2021-08-29 NOTE — PLAN OF CARE
Problem: Falls - Risk of:  Goal: Will remain free from falls  Description: Will remain free from falls  Outcome: Ongoing  Goal: Absence of physical injury  Description: Absence of physical injury  Outcome: Ongoing     Problem: Skin Integrity:  Goal: Will show no infection signs and symptoms  Description: Will show no infection signs and symptoms  Outcome: Ongoing  Goal: Absence of new skin breakdown  Description: Absence of new skin breakdown  Outcome: Ongoing     Problem: Pain:  Goal: Pain level will decrease  Description: Pain level will decrease  Outcome: Ongoing  Goal: Control of acute pain  Description: Control of acute pain  Outcome: Ongoing  Goal: Control of chronic pain  Description: Control of chronic pain  Outcome: Ongoing     Problem: Metabolic:  Goal: Ability to maintain appropriate glucose levels will improve  Description: Ability to maintain appropriate glucose levels will improve  Outcome: Ongoing     Problem: OXYGENATION/RESPIRATORY FUNCTION  Goal: Patient will maintain patent airway  Outcome: Ongoing  Goal: Patient will achieve/maintain normal respiratory rate/effort  Description: Respiratory rate and effort will be within normal limits for the patient  Outcome: Ongoing

## 2021-08-30 LAB
ANION GAP SERPL CALCULATED.3IONS-SCNC: 10 MMOL/L (ref 3–16)
BUN BLDV-MCNC: 48 MG/DL (ref 7–20)
CALCIUM SERPL-MCNC: 9.9 MG/DL (ref 8.3–10.6)
CHLORIDE BLD-SCNC: 95 MMOL/L (ref 99–110)
CO2: 33 MMOL/L (ref 21–32)
CREAT SERPL-MCNC: 1.4 MG/DL (ref 0.8–1.3)
GFR AFRICAN AMERICAN: >60
GFR NON-AFRICAN AMERICAN: 50
GLUCOSE BLD-MCNC: 193 MG/DL (ref 70–99)
GLUCOSE BLD-MCNC: 199 MG/DL (ref 70–99)
GLUCOSE BLD-MCNC: 223 MG/DL (ref 70–99)
GLUCOSE BLD-MCNC: 246 MG/DL (ref 70–99)
GLUCOSE BLD-MCNC: 299 MG/DL (ref 70–99)
PERFORMED ON: ABNORMAL
POTASSIUM SERPL-SCNC: 4.2 MMOL/L (ref 3.5–5.1)
SODIUM BLD-SCNC: 138 MMOL/L (ref 136–145)

## 2021-08-30 PROCEDURE — 6370000000 HC RX 637 (ALT 250 FOR IP): Performed by: INTERNAL MEDICINE

## 2021-08-30 PROCEDURE — 6360000002 HC RX W HCPCS: Performed by: NURSE PRACTITIONER

## 2021-08-30 PROCEDURE — 80048 BASIC METABOLIC PNL TOTAL CA: CPT

## 2021-08-30 PROCEDURE — 94761 N-INVAS EAR/PLS OXIMETRY MLT: CPT

## 2021-08-30 PROCEDURE — 2580000003 HC RX 258: Performed by: INTERNAL MEDICINE

## 2021-08-30 PROCEDURE — 6370000000 HC RX 637 (ALT 250 FOR IP): Performed by: REGISTERED NURSE

## 2021-08-30 PROCEDURE — 36415 COLL VENOUS BLD VENIPUNCTURE: CPT

## 2021-08-30 PROCEDURE — 99233 SBSQ HOSP IP/OBS HIGH 50: CPT | Performed by: NURSE PRACTITIONER

## 2021-08-30 PROCEDURE — 1200000000 HC SEMI PRIVATE

## 2021-08-30 PROCEDURE — 6360000002 HC RX W HCPCS: Performed by: INTERNAL MEDICINE

## 2021-08-30 PROCEDURE — 2700000000 HC OXYGEN THERAPY PER DAY

## 2021-08-30 RX ORDER — TORSEMIDE 100 MG/1
50 TABLET ORAL 2 TIMES DAILY
Status: DISCONTINUED | OUTPATIENT
Start: 2021-08-31 | End: 2021-08-31 | Stop reason: HOSPADM

## 2021-08-30 RX ADMIN — ASPIRIN 81 MG 81 MG: 81 TABLET ORAL at 09:10

## 2021-08-30 RX ADMIN — OXYCODONE AND ACETAMINOPHEN 1 TABLET: 5; 325 TABLET ORAL at 09:01

## 2021-08-30 RX ADMIN — SENNOSIDES 8.6 MG: 8.6 TABLET, FILM COATED ORAL at 20:08

## 2021-08-30 RX ADMIN — SODIUM CHLORIDE, PRESERVATIVE FREE 10 ML: 5 INJECTION INTRAVENOUS at 09:12

## 2021-08-30 RX ADMIN — OXYCODONE AND ACETAMINOPHEN 1 TABLET: 5; 325 TABLET ORAL at 18:03

## 2021-08-30 RX ADMIN — HEPARIN SODIUM 5000 UNITS: 5000 INJECTION INTRAVENOUS; SUBCUTANEOUS at 14:11

## 2021-08-30 RX ADMIN — OXYCODONE AND ACETAMINOPHEN 1 TABLET: 5; 325 TABLET ORAL at 23:03

## 2021-08-30 RX ADMIN — ACETAMINOPHEN 650 MG: 325 TABLET ORAL at 00:04

## 2021-08-30 RX ADMIN — ATORVASTATIN CALCIUM 40 MG: 40 TABLET, FILM COATED ORAL at 20:08

## 2021-08-30 RX ADMIN — HEPARIN SODIUM 5000 UNITS: 5000 INJECTION INTRAVENOUS; SUBCUTANEOUS at 06:34

## 2021-08-30 RX ADMIN — SODIUM CHLORIDE, PRESERVATIVE FREE 10 ML: 5 INJECTION INTRAVENOUS at 20:08

## 2021-08-30 RX ADMIN — INSULIN LISPRO 10 UNITS: 100 INJECTION, SOLUTION INTRAVENOUS; SUBCUTANEOUS at 18:05

## 2021-08-30 RX ADMIN — SPIRONOLACTONE 25 MG: 25 TABLET, FILM COATED ORAL at 09:10

## 2021-08-30 RX ADMIN — INSULIN LISPRO 10 UNITS: 100 INJECTION, SOLUTION INTRAVENOUS; SUBCUTANEOUS at 12:52

## 2021-08-30 RX ADMIN — FERROUS SULFATE TAB 325 MG (65 MG ELEMENTAL FE) 325 MG: 325 (65 FE) TAB at 12:52

## 2021-08-30 RX ADMIN — ACETAMINOPHEN 650 MG: 325 TABLET ORAL at 06:34

## 2021-08-30 RX ADMIN — FERROUS SULFATE TAB 325 MG (65 MG ELEMENTAL FE) 325 MG: 325 (65 FE) TAB at 09:01

## 2021-08-30 RX ADMIN — INSULIN LISPRO 3 UNITS: 100 INJECTION, SOLUTION INTRAVENOUS; SUBCUTANEOUS at 09:16

## 2021-08-30 RX ADMIN — OXYCODONE AND ACETAMINOPHEN 1 TABLET: 5; 325 TABLET ORAL at 12:52

## 2021-08-30 RX ADMIN — INSULIN LISPRO 10 UNITS: 100 INJECTION, SOLUTION INTRAVENOUS; SUBCUTANEOUS at 09:16

## 2021-08-30 RX ADMIN — COLCHICINE 0.6 MG: 0.6 TABLET ORAL at 09:15

## 2021-08-30 RX ADMIN — HEPARIN SODIUM 5000 UNITS: 5000 INJECTION INTRAVENOUS; SUBCUTANEOUS at 23:02

## 2021-08-30 RX ADMIN — POTASSIUM CHLORIDE 20 MEQ: 20 TABLET, EXTENDED RELEASE ORAL at 09:10

## 2021-08-30 RX ADMIN — FUROSEMIDE 60 MG: 10 INJECTION, SOLUTION INTRAMUSCULAR; INTRAVENOUS at 09:12

## 2021-08-30 RX ADMIN — SENNOSIDES 8.6 MG: 8.6 TABLET, FILM COATED ORAL at 09:13

## 2021-08-30 RX ADMIN — INSULIN LISPRO 3 UNITS: 100 INJECTION, SOLUTION INTRAVENOUS; SUBCUTANEOUS at 20:14

## 2021-08-30 RX ADMIN — ISOSORBIDE MONONITRATE 30 MG: 30 TABLET, EXTENDED RELEASE ORAL at 09:10

## 2021-08-30 RX ADMIN — METOLAZONE 2.5 MG: 2.5 TABLET ORAL at 09:11

## 2021-08-30 RX ADMIN — INSULIN LISPRO 6 UNITS: 100 INJECTION, SOLUTION INTRAVENOUS; SUBCUTANEOUS at 18:05

## 2021-08-30 RX ADMIN — PREGABALIN 50 MG: 25 CAPSULE ORAL at 18:02

## 2021-08-30 RX ADMIN — INSULIN LISPRO 9 UNITS: 100 INJECTION, SOLUTION INTRAVENOUS; SUBCUTANEOUS at 12:53

## 2021-08-30 RX ADMIN — ALLOPURINOL 300 MG: 300 TABLET ORAL at 09:11

## 2021-08-30 RX ADMIN — INSULIN GLARGINE 40 UNITS: 100 INJECTION, SOLUTION SUBCUTANEOUS at 20:08

## 2021-08-30 RX ADMIN — POLYETHYLENE GLYCOL 3350 17 G: 17 POWDER, FOR SOLUTION ORAL at 09:01

## 2021-08-30 RX ADMIN — FUROSEMIDE 60 MG: 10 INJECTION, SOLUTION INTRAMUSCULAR; INTRAVENOUS at 18:56

## 2021-08-30 RX ADMIN — CARVEDILOL 12.5 MG: 6.25 TABLET, FILM COATED ORAL at 09:10

## 2021-08-30 RX ADMIN — FERROUS SULFATE TAB 325 MG (65 MG ELEMENTAL FE) 325 MG: 325 (65 FE) TAB at 18:03

## 2021-08-30 RX ADMIN — CARVEDILOL 12.5 MG: 6.25 TABLET, FILM COATED ORAL at 18:03

## 2021-08-30 ASSESSMENT — PAIN DESCRIPTION - PAIN TYPE
TYPE: CHRONIC PAIN

## 2021-08-30 ASSESSMENT — PAIN SCALES - GENERAL
PAINLEVEL_OUTOF10: 7
PAINLEVEL_OUTOF10: 6
PAINLEVEL_OUTOF10: 0
PAINLEVEL_OUTOF10: 8
PAINLEVEL_OUTOF10: 7
PAINLEVEL_OUTOF10: 7
PAINLEVEL_OUTOF10: 8
PAINLEVEL_OUTOF10: 7

## 2021-08-30 ASSESSMENT — PAIN DESCRIPTION - LOCATION
LOCATION: FOOT;LEG
LOCATION: FOOT;LEG
LOCATION: LEG

## 2021-08-30 ASSESSMENT — PAIN DESCRIPTION - DESCRIPTORS
DESCRIPTORS: SORE;ACHING
DESCRIPTORS: ACHING;THROBBING
DESCRIPTORS: ACHING;THROBBING

## 2021-08-30 ASSESSMENT — PAIN DESCRIPTION - ORIENTATION
ORIENTATION: RIGHT;LEFT

## 2021-08-30 NOTE — CARE COORDINATION
CM met with pt at bedside to discuss DCP. Pt plans to return back home with skilled and non-skilled [facility] and has a ride home and able to get his portable oxygen tank. CM following-Leighann Torres RN

## 2021-08-30 NOTE — PLAN OF CARE
Protect patient from fall injury by keeping bed in low position, use of non skid socks and bed alarm system. Keep belongings and call light with reach at all times and following fall protocol. Monitor Respiratory status, lung sounds, O2 saturation. Monitor for chest pain. Monitor electrolytes and keep strict I and O. Assess skin for signs of breakdown. Use silver fabric in skin folds. Keep O2 tubing padded and assess red areas behind ears . Assess ulcers on legs and follow wound care's plan of care. Encourage patient to ask for pain medication before pain is above a 5 on the 1/10 pain scale. Medicate before PT or increased activity. Use alternatives such as ice (if ordered) or distraction as often as possible to minimize  need for medication.

## 2021-08-30 NOTE — PROGRESS NOTES
Hospitalist Progress Note      PCP: Jose Manuel Mcfadden MD    Date of Admission: 8/23/2021    Chief Complaint: Chest Pain     Subjective: no new c/o. Medications:  Reviewed    Infusion Medications    sodium chloride      dextrose       Scheduled Medications    furosemide  60 mg IntraVENous BID    insulin lispro  10 Units SubCUTAneous TID WC    carvedilol  12.5 mg Oral BID WC    potassium chloride  20 mEq Oral Daily    insulin glargine  40 Units SubCUTAneous Nightly    sodium chloride flush  5-40 mL IntraVENous 2 times per day    heparin (porcine)  5,000 Units SubCUTAneous 3 times per day    allopurinol  300 mg Oral Daily    aspirin  81 mg Oral Daily    colchicine  0.6 mg Oral Daily    ferrous sulfate  325 mg Oral TID WC    isosorbide mononitrate  30 mg Oral Daily    oxyCODONE-acetaminophen  1 tablet Oral 4x Daily    polyethylene glycol  17 g Oral Daily    pregabalin  50 mg Oral Dinner    senna  1 tablet Oral BID    metOLazone  2.5 mg Oral Daily    atorvastatin  40 mg Oral Nightly    spironolactone  25 mg Oral Daily    insulin lispro  0-18 Units SubCUTAneous TID WC    insulin lispro  0-9 Units SubCUTAneous Nightly     PRN Meds: sodium chloride flush, sodium chloride, ondansetron **OR** ondansetron, polyethylene glycol, acetaminophen **OR** acetaminophen, perflutren lipid microspheres, nitroGLYCERIN, morphine, glucose, dextrose, glucagon (rDNA), dextrose      Intake/Output Summary (Last 24 hours) at 8/30/2021 1034  Last data filed at 8/30/2021 0920  Gross per 24 hour   Intake 978 ml   Output 2700 ml   Net -1722 ml       Physical Exam Performed:    BP (!) 144/79   Pulse 100   Temp 97.9 °F (36.6 °C) (Axillary)   Resp 20   Ht 5' 9\" (1.753 m)   Wt 298 lb 14.4 oz (135.6 kg)   SpO2 93%   BMI 44.14 kg/m²     General appearance: No apparent distress, appears stated age and cooperative. HEENT: Pupils equal, round, and reactive to light. Conjunctivae/corneas clear.   Neck: Supple, with full range of motion. No jugular venous distention. Trachea midline. Respiratory:  Normal respiratory effort. Clear to auscultation, bilaterally without Rales/Wheezes/Rhonchi. Cardiovascular: Regular rate and rhythm with normal S1/S2 without murmurs, rubs or gallops. Abdomen: Soft, non-tender, non-distended with normal bowel sounds. Musculoskeletal: No clubbing, cyanosis or edema bilaterally. Full range of motion without deformity. Skin: Skin color, texture, turgor normal.  No rashes or lesions. Neurologic:  Neurovascularly intact without any focal sensory/motor deficits. Cranial nerves: II-XII intact, grossly non-focal.  Psychiatric: Alert and oriented, thought content appropriate, normal insight  Capillary Refill: Brisk,< 3 seconds   Peripheral Pulses: +2 palpable, equal bilaterally       Labs:   No results for input(s): WBC, HGB, HCT, PLT in the last 72 hours. Recent Labs     08/28/21  0802 08/29/21  0637 08/30/21  0620    138 138   K 4.1 4.4 4.2   CL 95* 97* 95*   CO2 30 32 33*   BUN 43* 44* 48*   CREATININE 1.3 1.3 1.4*   CALCIUM 9.9 10.0 9.9     No results for input(s): AST, ALT, BILIDIR, BILITOT, ALKPHOS in the last 72 hours. No results for input(s): INR in the last 72 hours. No results for input(s): Sharri Belts in the last 72 hours.     Urinalysis:      Lab Results   Component Value Date    NITRU Negative 07/08/2019    WBCUA 0-2 11/05/2013    BACTERIA Rare 11/05/2013    RBCUA 0-2 11/05/2013    BLOODU Negative 07/08/2019    SPECGRAV 1.010 07/08/2019    GLUCOSEU Negative 07/08/2019    GLUCOSEU NEGATIVE 02/29/2012       Consults:    IP CONSULT TO HOSPITALIST  IP CONSULT TO HEART FAILURE NURSE/COORDINATOR  IP CONSULT TO DIETITIAN  IP CONSULT TO CARDIOLOGY  IP CONSULT TO CARDIOLOGY      Assessment/Plan:    Active Hospital Problems    Diagnosis     Cor pulmonale, chronic (HCC) [I27.81]     Pulmonary hypertension due to alveolar hypoventilation disorder (HCC) [I27.23]     Nonrheumatic aortic Complicating assessment and treatment. Placing patient at risk for multiple co-morbidities as well as early death and contributing to the patient's presentation. Counseled on weight loss. HENNA - likely 2nd to obesity. Controlled on home CPAP/BiPap - continued, w/ outpatient f/u as previously arranged.         DVT Prophylaxis: SQ Heparin     No results for input(s): PLT in the last 72 hours. Diet: ADULT DIET; Regular; Low Sodium (2 gm); 1800 ml  Code Status: Full Code      PT/OT Eval Status: seen w/ rec for home w/ assist    Dispo - possibly Monday/Tues 30/31 August pending clinical course and subspecialty recs.      Jolie Lombard, MD

## 2021-08-31 VITALS
DIASTOLIC BLOOD PRESSURE: 61 MMHG | BODY MASS INDEX: 44 KG/M2 | TEMPERATURE: 97.7 F | OXYGEN SATURATION: 94 % | WEIGHT: 297.1 LBS | HEART RATE: 88 BPM | SYSTOLIC BLOOD PRESSURE: 104 MMHG | RESPIRATION RATE: 16 BRPM | HEIGHT: 69 IN

## 2021-08-31 LAB
ANION GAP SERPL CALCULATED.3IONS-SCNC: 11 MMOL/L (ref 3–16)
BUN BLDV-MCNC: 52 MG/DL (ref 7–20)
CALCIUM SERPL-MCNC: 10 MG/DL (ref 8.3–10.6)
CHLORIDE BLD-SCNC: 94 MMOL/L (ref 99–110)
CO2: 31 MMOL/L (ref 21–32)
CREAT SERPL-MCNC: 1.6 MG/DL (ref 0.8–1.3)
GFR AFRICAN AMERICAN: 52
GFR NON-AFRICAN AMERICAN: 43
GLUCOSE BLD-MCNC: 246 MG/DL (ref 70–99)
GLUCOSE BLD-MCNC: 386 MG/DL (ref 70–99)
GLUCOSE BLD-MCNC: 402 MG/DL (ref 70–99)
HCT VFR BLD CALC: 36.7 % (ref 40.5–52.5)
HEMOGLOBIN: 12.1 G/DL (ref 13.5–17.5)
MCH RBC QN AUTO: 28.5 PG (ref 26–34)
MCHC RBC AUTO-ENTMCNC: 33.1 G/DL (ref 31–36)
MCV RBC AUTO: 86.2 FL (ref 80–100)
PDW BLD-RTO: 17.5 % (ref 12.4–15.4)
PERFORMED ON: ABNORMAL
PERFORMED ON: ABNORMAL
PLATELET # BLD: 175 K/UL (ref 135–450)
PMV BLD AUTO: 8.3 FL (ref 5–10.5)
POTASSIUM SERPL-SCNC: 4.4 MMOL/L (ref 3.5–5.1)
RBC # BLD: 4.25 M/UL (ref 4.2–5.9)
SODIUM BLD-SCNC: 136 MMOL/L (ref 136–145)
WBC # BLD: 6.2 K/UL (ref 4–11)

## 2021-08-31 PROCEDURE — 6370000000 HC RX 637 (ALT 250 FOR IP): Performed by: NURSE PRACTITIONER

## 2021-08-31 PROCEDURE — 36415 COLL VENOUS BLD VENIPUNCTURE: CPT

## 2021-08-31 PROCEDURE — 85027 COMPLETE CBC AUTOMATED: CPT

## 2021-08-31 PROCEDURE — 6360000002 HC RX W HCPCS: Performed by: INTERNAL MEDICINE

## 2021-08-31 PROCEDURE — 6370000000 HC RX 637 (ALT 250 FOR IP): Performed by: INTERNAL MEDICINE

## 2021-08-31 PROCEDURE — 99232 SBSQ HOSP IP/OBS MODERATE 35: CPT | Performed by: NURSE PRACTITIONER

## 2021-08-31 PROCEDURE — 94761 N-INVAS EAR/PLS OXIMETRY MLT: CPT

## 2021-08-31 PROCEDURE — 2580000003 HC RX 258: Performed by: INTERNAL MEDICINE

## 2021-08-31 PROCEDURE — 6370000000 HC RX 637 (ALT 250 FOR IP): Performed by: REGISTERED NURSE

## 2021-08-31 PROCEDURE — 2700000000 HC OXYGEN THERAPY PER DAY

## 2021-08-31 PROCEDURE — 80048 BASIC METABOLIC PNL TOTAL CA: CPT

## 2021-08-31 RX ORDER — METOLAZONE 2.5 MG/1
2.5 TABLET ORAL EVERY OTHER DAY
Qty: 15 TABLET | Refills: 3 | Status: ON HOLD | OUTPATIENT
Start: 2021-08-31 | End: 2022-03-14 | Stop reason: SDUPTHER

## 2021-08-31 RX ORDER — SPIRONOLACTONE 25 MG/1
25 TABLET ORAL DAILY
Qty: 30 TABLET | Refills: 3 | Status: ON HOLD | OUTPATIENT
Start: 2021-09-01 | End: 2022-04-09 | Stop reason: HOSPADM

## 2021-08-31 RX ORDER — TORSEMIDE 100 MG/1
50 TABLET ORAL 2 TIMES DAILY
Qty: 30 TABLET | Refills: 3 | Status: ON HOLD | OUTPATIENT
Start: 2021-08-31 | End: 2022-01-17 | Stop reason: SDUPTHER

## 2021-08-31 RX ORDER — CARVEDILOL 12.5 MG/1
12.5 TABLET ORAL 2 TIMES DAILY WITH MEALS
Qty: 60 TABLET | Refills: 3 | Status: ON HOLD | OUTPATIENT
Start: 2021-08-31 | End: 2022-03-14 | Stop reason: HOSPADM

## 2021-08-31 RX ORDER — ATORVASTATIN CALCIUM 40 MG/1
40 TABLET, FILM COATED ORAL NIGHTLY
Qty: 30 TABLET | Refills: 3 | Status: ON HOLD | OUTPATIENT
Start: 2021-08-31 | End: 2022-03-14 | Stop reason: HOSPADM

## 2021-08-31 RX ADMIN — INSULIN LISPRO 18 UNITS: 100 INJECTION, SOLUTION INTRAVENOUS; SUBCUTANEOUS at 10:03

## 2021-08-31 RX ADMIN — SPIRONOLACTONE 25 MG: 25 TABLET, FILM COATED ORAL at 08:17

## 2021-08-31 RX ADMIN — COLCHICINE 0.6 MG: 0.6 TABLET ORAL at 08:17

## 2021-08-31 RX ADMIN — FERROUS SULFATE TAB 325 MG (65 MG ELEMENTAL FE) 325 MG: 325 (65 FE) TAB at 15:03

## 2021-08-31 RX ADMIN — ASPIRIN 81 MG 81 MG: 81 TABLET ORAL at 08:17

## 2021-08-31 RX ADMIN — METOLAZONE 2.5 MG: 2.5 TABLET ORAL at 08:17

## 2021-08-31 RX ADMIN — CARVEDILOL 12.5 MG: 6.25 TABLET, FILM COATED ORAL at 08:17

## 2021-08-31 RX ADMIN — HEPARIN SODIUM 5000 UNITS: 5000 INJECTION INTRAVENOUS; SUBCUTANEOUS at 06:28

## 2021-08-31 RX ADMIN — POLYETHYLENE GLYCOL 3350 17 G: 17 POWDER, FOR SOLUTION ORAL at 08:16

## 2021-08-31 RX ADMIN — POTASSIUM CHLORIDE 20 MEQ: 20 TABLET, EXTENDED RELEASE ORAL at 08:17

## 2021-08-31 RX ADMIN — SENNOSIDES 8.6 MG: 8.6 TABLET, FILM COATED ORAL at 08:16

## 2021-08-31 RX ADMIN — SODIUM CHLORIDE, PRESERVATIVE FREE 10 ML: 5 INJECTION INTRAVENOUS at 08:18

## 2021-08-31 RX ADMIN — INSULIN LISPRO 10 UNITS: 100 INJECTION, SOLUTION INTRAVENOUS; SUBCUTANEOUS at 10:02

## 2021-08-31 RX ADMIN — INSULIN LISPRO 10 UNITS: 100 INJECTION, SOLUTION INTRAVENOUS; SUBCUTANEOUS at 12:10

## 2021-08-31 RX ADMIN — TORSEMIDE 50 MG: 100 TABLET ORAL at 08:17

## 2021-08-31 RX ADMIN — ISOSORBIDE MONONITRATE 30 MG: 30 TABLET, EXTENDED RELEASE ORAL at 08:16

## 2021-08-31 RX ADMIN — INSULIN LISPRO 15 UNITS: 100 INJECTION, SOLUTION INTRAVENOUS; SUBCUTANEOUS at 12:10

## 2021-08-31 RX ADMIN — ALLOPURINOL 300 MG: 300 TABLET ORAL at 08:17

## 2021-08-31 RX ADMIN — OXYCODONE AND ACETAMINOPHEN 1 TABLET: 5; 325 TABLET ORAL at 08:17

## 2021-08-31 RX ADMIN — OXYCODONE AND ACETAMINOPHEN 1 TABLET: 5; 325 TABLET ORAL at 15:03

## 2021-08-31 RX ADMIN — FERROUS SULFATE TAB 325 MG (65 MG ELEMENTAL FE) 325 MG: 325 (65 FE) TAB at 08:16

## 2021-08-31 ASSESSMENT — PAIN DESCRIPTION - LOCATION: LOCATION: LEG

## 2021-08-31 ASSESSMENT — PAIN SCALES - GENERAL: PAINLEVEL_OUTOF10: 5

## 2021-08-31 ASSESSMENT — PAIN DESCRIPTION - ORIENTATION: ORIENTATION: LEFT;RIGHT

## 2021-08-31 ASSESSMENT — PAIN DESCRIPTION - PAIN TYPE: TYPE: CHRONIC PAIN

## 2021-08-31 NOTE — PROGRESS NOTES
Hospitalist Progress Note      PCP: Denzel Domingo MD    Date of Admission: 8/23/2021    Chief Complaint: Chest Pain     Subjective: no new c/o. Medications:  Reviewed    Infusion Medications    sodium chloride      dextrose       Scheduled Medications    torsemide  50 mg Oral BID    insulin lispro  10 Units SubCUTAneous TID WC    carvedilol  12.5 mg Oral BID WC    potassium chloride  20 mEq Oral Daily    insulin glargine  40 Units SubCUTAneous Nightly    sodium chloride flush  5-40 mL IntraVENous 2 times per day    heparin (porcine)  5,000 Units SubCUTAneous 3 times per day    allopurinol  300 mg Oral Daily    aspirin  81 mg Oral Daily    colchicine  0.6 mg Oral Daily    ferrous sulfate  325 mg Oral TID WC    isosorbide mononitrate  30 mg Oral Daily    oxyCODONE-acetaminophen  1 tablet Oral 4x Daily    polyethylene glycol  17 g Oral Daily    pregabalin  50 mg Oral Dinner    senna  1 tablet Oral BID    metOLazone  2.5 mg Oral Daily    atorvastatin  40 mg Oral Nightly    spironolactone  25 mg Oral Daily    insulin lispro  0-18 Units SubCUTAneous TID WC    insulin lispro  0-9 Units SubCUTAneous Nightly     PRN Meds: sodium chloride flush, sodium chloride, ondansetron **OR** ondansetron, polyethylene glycol, acetaminophen **OR** acetaminophen, perflutren lipid microspheres, nitroGLYCERIN, morphine, glucose, dextrose, glucagon (rDNA), dextrose      Intake/Output Summary (Last 24 hours) at 8/31/2021 0825  Last data filed at 8/31/2021 9097  Gross per 24 hour   Intake 958 ml   Output 1250 ml   Net -292 ml       Physical Exam Performed:    /61   Pulse 88   Temp 97.7 °F (36.5 °C) (Oral)   Resp 16   Ht 5' 9\" (1.753 m)   Wt 297 lb 1.6 oz (134.8 kg)   SpO2 94%   BMI 43.87 kg/m²     General appearance: No apparent distress, appears stated age and cooperative. HEENT: Pupils equal, round, and reactive to light. Conjunctivae/corneas clear. Neck: Supple, with full range of motion.  No jugular venous distention. Trachea midline. Respiratory:  Normal respiratory effort. Clear to auscultation, bilaterally without Rales/Wheezes/Rhonchi. Cardiovascular: Regular rate and rhythm with normal S1/S2 without murmurs, rubs or gallops. Abdomen: Soft, non-tender, non-distended with normal bowel sounds. Musculoskeletal: No clubbing, cyanosis or edema bilaterally. Full range of motion without deformity. Skin: Skin color, texture, turgor normal.  No rashes or lesions. Neurologic:  Neurovascularly intact without any focal sensory/motor deficits. Cranial nerves: II-XII intact, grossly non-focal.  Psychiatric: Alert and oriented, thought content appropriate, normal insight  Capillary Refill: Brisk,< 3 seconds   Peripheral Pulses: +2 palpable, equal bilaterally       Labs:   Recent Labs     08/31/21  0611   WBC 6.2   HGB 12.1*   HCT 36.7*        Recent Labs     08/29/21  0637 08/30/21  0620 08/31/21  0611    138 136   K 4.4 4.2 4.4   CL 97* 95* 94*   CO2 32 33* 31   BUN 44* 48* 52*   CREATININE 1.3 1.4* 1.6*   CALCIUM 10.0 9.9 10.0     No results for input(s): AST, ALT, BILIDIR, BILITOT, ALKPHOS in the last 72 hours. No results for input(s): INR in the last 72 hours. No results for input(s): Gemini Lowers in the last 72 hours.     Urinalysis:      Lab Results   Component Value Date    NITRU Negative 07/08/2019    WBCUA 0-2 11/05/2013    BACTERIA Rare 11/05/2013    RBCUA 0-2 11/05/2013    BLOODU Negative 07/08/2019    SPECGRAV 1.010 07/08/2019    GLUCOSEU Negative 07/08/2019    GLUCOSEU NEGATIVE 02/29/2012       Consults:    IP CONSULT TO HOSPITALIST  IP CONSULT TO HEART FAILURE NURSE/COORDINATOR  IP CONSULT TO DIETITIAN  IP CONSULT TO CARDIOLOGY  IP CONSULT TO CARDIOLOGY      Assessment/Plan:    Active Hospital Problems    Diagnosis     Cor pulmonale, chronic (HCC) [I27.81]     Pulmonary hypertension due to alveolar hypoventilation disorder (HCC) [I27.23]     Nonrheumatic aortic valve stenosis [I35.0]     Chest pain [R07.9]     Acute diastolic congestive heart failure (HCC) [I50.31]     Severe obstructive sleep apnea [G47.33]     HLD (hyperlipidemia) [E78.5]     Morbid obesity due to excess calories ([facility] [address] [E66.01]     S/P CABG x 3 [Z95.1]     Essential hypertension [I10]     DM (diabetes mellitus) ([facility] [address] [E11.9]     Coronary artery disease involving native coronary artery of native heart without angina pectoris [I25.10]        CHF - acute on chronic diastolic failure w/ preserved EF 55-60% by Echo this admission. Likely due to hypertensive heart disease. Continue current medical management and follow I/O as well as clinical response. Defer diuresis to Cardiology.     Acute on chronic hypoxic respiratory failure:  - Secondary to above. - Wean supplemental O2 as tolerated. He wears 3-4 LPM at night. Currently requiring 2 LPM during the day.      Chest pain w/ elevated troponin in pt with CAD s/p CABG x3  - Troponin 0.04 --> 0.03.  Likely due to CHF.    - Last cath in 2012 showed widely patent grafts. - Cardiology consulted. - Continue aspirin, statin, carvedilol, imdur.     HTN/CAD - w/ known CAD s/p prior CABG x 3, but no evidence of active signs/sxs of ischemia/failure. Currently controlled on home meds w/ vitals reviewed and documented as above. HyperLipidemia - controlled on home Statin. Continue, w/ f/u and med adjustment w/ PCP    CKD - baseline stage 3. Follow serial labs. Reviewed and documented as above. Follows outpt w/ Dr. Karey Cervantes.      DM2:  - Unontrolled, A1c 8.5  - Continue basal bolus insulin -- monitor and adjust as needed. - Carb-control diet.      Morbid Obesity -  With Body mass index is 43.87 kg/m². Complicating assessment and treatment. Placing patient at risk for multiple co-morbidities as well as early death and contributing to the patient's presentation. Counseled on weight loss. HENNA - likely 2nd to obesity.   Controlled on home CPAP/BiPap - continued, w/ outpatient f/u as previously arranged.         DVT Prophylaxis: SQ Heparin     Recent Labs     08/31/21  0611        Diet: ADULT DIET; Regular; Low Sodium (2 gm); 1800 ml  Code Status: Full Code      PT/OT Eval Status: seen w/ rec for home w/ assist    Dispo - possibly Tues/Wed 31 August/1 Sept pending clinical course and subspecialty recs.      Bennett Varghese MD

## 2021-08-31 NOTE — PROGRESS NOTES
Pt assessment completed and charted. VSS. Pt a/ox4. Pt denies any pain at this time. Bed in lowest position and wheels locked. Call light within reach. Bedside table within reach. Non-skid footwear in place. Pt denies any other needs at this time. Pt calls out appropriately. Will continue to monitor.

## 2021-08-31 NOTE — CARE COORDINATION
CASE MANAGEMENT DISCHARGE SUMMARY      Discharge to: Home with JANICE thru [facility]. SN, PT and OT and Non-Skilled Services with [facility]. IMM given:  8/30/21     New Durable Medical Equipment ordered/agency: None    Transportation: Friend and he will bring in portable oxygen tank. Confirmed discharge plan with: Met with pt at bedside and confirmed. Patient: yes   Facility/Agency, name:  [facility] faxed- [facility] and they have access to epic. RN, name: Trevor Ferrell    Note: Discharging nurse to complete [facility], reconcile AVS, and place final copy with patient's discharge packet. RN to ensure that written prescriptions for  Level II medications are sent with patient to the facility as per protocol.

## 2021-08-31 NOTE — PROGRESS NOTES
[facility]   Daily Progress Note    Admit Date:  8/23/2021  HPI:    Chief Complaint   Patient presents with    Chest Pain     pt to ED via EMS from home with chest pain. hx of CHF. denies SOB. took 4 baby aspirin PTA and denied nitro. wears 4 L O2 at home at baseline        Interval history: Michelle Wallace is being followed for shorntess of breath, chest pain and CHF. Subjective:  Mr. Sara Henry  Ready to go home.  Breathing is improved    Objective:   /61   Pulse 88   Temp 97.7 °F (36.5 °C) (Oral)   Resp 16   Ht 5' 9\" (1.753 m)   Wt 297 lb 1.6 oz (134.8 kg)   SpO2 94%   BMI 43.87 kg/m²       Intake/Output Summary (Last 24 hours) at 8/31/2021 0846  Last data filed at 8/31/2021 0494  Gross per 24 hour   Intake 958 ml   Output 1250 ml   Net -292 ml       NYHA: III    Physical Exam:  General:  Awake, alert, NAD, sitting on the side of the bed  Skin:  Warm and dry  Neck:  JVD difficult to assess    Chest:  Dim to auscultation, no wheezes/rhonchi/ rales  Telemetry: sinus arrhythmia , tachy rate 100  Cardiovascular:  RRR S1S2, no m/r/g   Abdomen:  Soft, nontender, +bowel sounds  Extremities:  + bLE bilateral lower extremity edema- legs wrapped     Medications:    torsemide  50 mg Oral BID    insulin lispro  10 Units SubCUTAneous TID WC    carvedilol  12.5 mg Oral BID WC    potassium chloride  20 mEq Oral Daily    insulin glargine  40 Units SubCUTAneous Nightly    sodium chloride flush  5-40 mL IntraVENous 2 times per day    heparin (porcine)  5,000 Units SubCUTAneous 3 times per day    allopurinol  300 mg Oral Daily    aspirin  81 mg Oral Daily    colchicine  0.6 mg Oral Daily    ferrous sulfate  325 mg Oral TID WC    isosorbide mononitrate  30 mg Oral Daily    oxyCODONE-acetaminophen  1 tablet Oral 4x Daily    polyethylene glycol  17 g Oral Daily    pregabalin  50 mg Oral Dinner    senna  1 tablet Oral BID    metOLazone  2.5 mg Oral Daily    atorvastatin  40 mg Oral Nightly  spironolactone  25 mg Oral Daily    insulin lispro  0-18 Units SubCUTAneous TID     insulin lispro  0-9 Units SubCUTAneous Nightly      sodium chloride      dextrose         Lab Data:  CBC:   Recent Labs     08/31/21  0611   WBC 6.2   HGB 12.1*        BMP:    Recent Labs     08/29/21  0637 08/30/21  0620 08/31/21  0611    138 136   K 4.4 4.2 4.4   CO2 32 33* 31   BUN 44* 48* 52*   CREATININE 1.3 1.4* 1.6*     INR:  No results for input(s): INR in the last 72 hours. BNP:    Recent Labs     08/29/21  0637   PROBNP 1,000*     Lab Results   Component Value Date    LVEF 58 04/26/2018       Testing:   echo  8/24/21   Technically difficult examination.    Normal left ventricular systolic function with ejection fraction of 55-60%.    Paradoxic septal motion    Mild concentric left ventricular hypertrophy.    Left ventricular diastolic filling pressure is elevated.    Compared to previous study from 4- no changes noted in left    ventricular function.    Mild mitral and pulmonic regurgitation.    Bi-atrial enlargement.    Moderate aortic stenosis, difficult to fully evaluate on this study    The right ventricle is moderately enlarged.    Right ventricular systolic function is moderately reduced .    Mild to moderate tricuspid regurgitation.    Systolic pulmonic artery pressure (SPAP) is estimated at 65 mmHg consistent    with severe pulmonary hypertension (Right atrial pressure of 15 mmHg),    however difficult to accurately measure pressures on this study.    The right atrium is moderately dilated.      08/30/21 0211  298 lb 14.4 oz (135.6 kg)  Actual;Standing scale      08/29/21 0345  298 lb 12.8 oz (135.5 kg)  --     08/28/21 0505  301 lb 12.8 oz (136.9 kg)  Actual;Standing scale     08/27/21 0533  308 lb 10.3 oz (140 kg)  --     08/26/21 0302  308 lb 8 oz (139.9 kg)  Standing scale     08/25/21 0612  311 lb (141.1 kg)  Standing scale     08/23/21 1412  295 lb (133.8 kg)  Stated     Principal Problem:    Chest pain  Active Problems:    DM (diabetes mellitus) ([facility] [address]    Coronary artery disease involving native coronary artery of native heart without angina pectoris    Essential hypertension    Morbid obesity due to excess calories (HCC)    S/P CABG x 3    HLD (hyperlipidemia)    Severe obstructive sleep apnea    Acute diastolic congestive heart failure (HCC)    Cor pulmonale, chronic (HCC)    Pulmonary hypertension due to alveolar hypoventilation disorder (HCC)    Nonrheumatic aortic valve stenosis  Resolved Problems:    * No resolved hospital problems.  *    Assessment:  Acute on chronic diastolic heart failure  Acute on Chronic cor pulmonale  HENNA  Moderate aortic stenosis   Atrial arrhythmia   Obese  CAD s/p CABG  HTN  DM  HLD  CKD stage 3- stable     Plan:  Continue daily weights, 1800ml fluid restriction  Spironolactone (aldactone) 25mg daily  Hold metolazone today and then continue 2.5mg every other day    Continue coreg 12.5mg BID  Torsemide 50mg twice a day for now  Stop potassium at discharge     Once a week labs through home care    [id number] Mary Jeff for discharge today     PATRICK Harris - CNP, CNP, 8/31/2021, 9:20 AM

## 2021-09-01 ENCOUNTER — TELEPHONE (OUTPATIENT)
Dept: TELEMETRY | Age: 66
End: 2021-09-01

## 2021-09-01 NOTE — TELEPHONE ENCOUNTER
1st Attempt; No Answer- unable to leave voicemail. Will attempt follow up call again at another time.  Kalli Hays RN

## 2021-09-01 NOTE — DISCHARGE SUMMARY
and med adjustment w/ PCP     CKD - baseline stage 3. Followed serial labs. Follows outpt w/ Dr. Stacey Harp.      DM2:  - Unontrolled, A1c 8.5  - Continue basal bolus insulin -- monitor and adjust as needed. - Carb-control diet.      Morbid Obesity -  With Body mass index is 43.87 kg/m². Complicating assessment and treatment. Placing patient at risk for multiple co-morbidities as well as early death and contributing to the patient's presentation. Counseled on weight loss.      HENNA - likely 2nd to obesity. Controlled on home CPAP/BiPap - continued, w/ outpatient f/u as previously arranged.        Labs: For convenience and continuity at follow-up the following most recent labs are provided:      CBC:    Lab Results   Component Value Date    WBC 6.2 08/31/2021    HGB 12.1 08/31/2021    HCT 36.7 08/31/2021     08/31/2021       Renal:    Lab Results   Component Value Date     08/31/2021    K 4.4 08/31/2021    K 4.0 08/23/2021    CL 94 08/31/2021    CO2 31 08/31/2021    BUN 52 08/31/2021    CREATININE 1.6 08/31/2021    CALCIUM 10.0 08/31/2021    PHOS 3.8 12/24/2019         Significant Diagnostic Studies    Radiology:   XR CHEST PORTABLE   Final Result   Pulmonary vascular congestion/interstitial edema. Stable cardiomegaly. Consults:     IP CONSULT TO HOSPITALIST  IP CONSULT TO HEART FAILURE NURSE/COORDINATOR  IP CONSULT TO DIETITIAN  IP CONSULT TO CARDIOLOGY  IP CONSULT TO CARDIOLOGY  IP CONSULT TO HOME CARE NEEDS    Disposition: home     Condition at Discharge: Stable    Discharge Instructions/Follow-up:  w/ PCP 1-2 weeks and subspecialists as arranged.      Code Status:  Full code    Activity: activity as tolerated    Diet: regular diet      Discharge Medications:     Discharge Medication List as of 8/31/2021  3:18 PM           Details   spironolactone (ALDACTONE) 25 MG tablet Take 1 tablet by mouth daily, Disp-30 tablet, R-3Normal      atorvastatin (LIPITOR) 40 MG tablet Take 1 tablet by mouth nightly, Disp-30 tablet, R-3Normal              Details   carvedilol (COREG) 12.5 MG tablet Take 1 tablet by mouth 2 times daily (with meals), Disp-60 tablet, R-3Normal      metOLazone (ZAROXOLYN) 2.5 MG tablet Take 1 tablet by mouth every other day, Disp-15 tablet, R-3Normal      torsemide (DEMADEX) 100 MG tablet Take 0.5 tablets by mouth 2 times daily, Disp-30 tablet, R-3Normal              Details   oxyCODONE-acetaminophen (PERCOCET) 5-325 MG per tablet Take 1 tablet by mouth 4 times daily for 30 days. , Disp-120 tablet, R-0Normal      linaCLOtide (LINZESS PO) Take by mouthHistorical Med      clotrimazole (LOTRIMIN) 1 % external solution Apply 4 drops to the left ear twice daily (morning and evening) for 10 days, Disp-29.57 mL,R-1, Normal      acetaminophen (TYLENOL) 500 MG tablet Take 2 tablets by mouth 4 times daily as needed for Pain, Disp-60 tablet,R-0Print      hydrocortisone (ANUSOL-HC) 2.5 % rectal cream Place rectally 2 times daily. , Disp-1 Tube, R-0, Print      senna (SENOKOT) 8.6 MG TABS tablet Take 1 tablet by mouth 2 times daily, Disp-120 tablet, R-0Print      colchicine (COLCRYS) 0.6 MG tablet Take 1 tablet by mouth daily, Disp-30 tablet, R-3Normal      docusate sodium (COLACE, DULCOLAX) 100 MG CAPS Take 100 mg by mouth 2 times dailyOTC      insulin aspart (NOVOLOG) 100 UNIT/ML injection vial Inject into the skin 3 times daily (before meals) 50 units with breakfast, 20 units with lunch and 60 units with dinnerHistorical Med      allopurinol (ZYLOPRIM) 100 MG tablet Take 300 mg by mouth Historical Med      insulin glargine (LANTUS) 100 UNIT/ML injection vial Inject 55 Units into the skin 2 times daily, Disp-1 vial, R-1      Compression Stockings MISC Starting 6/15/2016, Until Discontinued, Disp-2 each, R-1, Print2 pair, knee high compression stockings, fitted/ zippered      pregabalin (LYRICA) 50 MG capsule Take 1 capsule by mouth Daily with supper, Disp-60 capsule, R-3      silver sulfADIAZINE (SILVADENE) 1 % cream Apply to BL lower leg ulcers daily, Disp-20 g, R-0, Print      nitroGLYCERIN (NITROSTAT) 0.4 MG SL tablet Place 1 tablet under the tongue every 5 minutes as needed, Disp-25 tablet, R-1      fluticasone (FLONASE) 50 MCG/ACT nasal spray 1 spray by Nasal route nightly as needed. aspirin 81 MG chewable tablet Take 1 tablet by mouth daily. , Disp-30 tablet      isosorbide mononitrate (IMDUR) 30 MG CR tablet Take 1 tablet by mouth daily. , Disp-30 tablet, R-0      ferrous sulfate 325 (65 FE) MG tablet Take 325 mg by mouth 3 times daily (with meals). omeprazole (PRILOSEC) 20 MG capsule Take 20 mg by mouth 2 times daily. Time Spent on discharge is more than 30 minutes in the examination, evaluation, counseling and review of medications and discharge plan. Signed:    Antoine Esquivel MD   9/1/2021      Thank you Garrett Urbina MD for the opportunity to be involved in this patient's care. If you have any questions or concerns please feel free to contact me at [phone].

## 2021-09-02 ENCOUNTER — TELEPHONE (OUTPATIENT)
Dept: TELEMETRY | Age: 66
End: 2021-09-02

## 2021-09-02 NOTE — TELEPHONE ENCOUNTER
2nd Attempt; No Answer- Left HIPAA compliant voicemail with Non-Urgent Heart Failure Resource Line number for call back.   Tristen Waller RN

## 2021-09-03 ENCOUNTER — TELEPHONE (OUTPATIENT)
Dept: TELEMETRY | Age: 66
End: 2021-09-03

## 2021-09-03 NOTE — TELEPHONE ENCOUNTER
3rd and final attempt; No Answer- Left HIPAA compliant voicemail with Non-Urgent Heart Failure Resource Line number for call back.     Mike Escobar RN

## 2021-09-13 ENCOUNTER — OFFICE VISIT (OUTPATIENT)
Dept: ENT CLINIC | Age: 66
End: 2021-09-13
Payer: MEDICARE

## 2021-09-13 VITALS — HEIGHT: 69 IN | BODY MASS INDEX: 43.99 KG/M2 | WEIGHT: 297 LBS | TEMPERATURE: 97.3 F

## 2021-09-13 DIAGNOSIS — H72.92 TYMPANIC MEMBRANE PERFORATION, LEFT: ICD-10-CM

## 2021-09-13 DIAGNOSIS — H90.A32 MIXED CONDUCTIVE AND SENSORINEURAL HEARING LOSS OF LEFT EAR WITH RESTRICTED HEARING OF RIGHT EAR: Primary | ICD-10-CM

## 2021-09-13 DIAGNOSIS — M26.622 ARTHRALGIA OF LEFT TEMPOROMANDIBULAR JOINT: ICD-10-CM

## 2021-09-13 PROCEDURE — 1036F TOBACCO NON-USER: CPT | Performed by: OTOLARYNGOLOGY

## 2021-09-13 PROCEDURE — 3017F COLORECTAL CA SCREEN DOC REV: CPT | Performed by: OTOLARYNGOLOGY

## 2021-09-13 PROCEDURE — 1111F DSCHRG MED/CURRENT MED MERGE: CPT | Performed by: OTOLARYNGOLOGY

## 2021-09-13 PROCEDURE — 99213 OFFICE O/P EST LOW 20 MIN: CPT | Performed by: OTOLARYNGOLOGY

## 2021-09-13 PROCEDURE — 1123F ACP DISCUSS/DSCN MKR DOCD: CPT | Performed by: OTOLARYNGOLOGY

## 2021-09-13 PROCEDURE — 4040F PNEUMOC VAC/ADMIN/RCVD: CPT | Performed by: OTOLARYNGOLOGY

## 2021-09-13 PROCEDURE — G8417 CALC BMI ABV UP PARAM F/U: HCPCS | Performed by: OTOLARYNGOLOGY

## 2021-09-13 PROCEDURE — G8427 DOCREV CUR MEDS BY ELIG CLIN: HCPCS | Performed by: OTOLARYNGOLOGY

## 2021-09-13 NOTE — PATIENT INSTRUCTIONS
Jaw joint recommendations as follows:  -Tylenol 500mg every 6 hours for left jaw/ear pain  -Soft (non-chewing) diet x 7 days  -Warm compresses to the left jaw joint (in front of the left ear) 3-4x daily for 7 days  -May continue using CPAP at night

## 2021-09-13 NOTE — PROGRESS NOTES
[address] [facility] [address] UP VISIT      Patient Name: Syeda Barksdale  Medical Record Number:  [id number]  Primary Care Physician:  Deysi Silverman MD    ChiefComplaint     Chief Complaint   Patient presents with    Ear Problem     Has pain in LT ear, comes and goes, LT side of face gets hot, feels like he is getting an infection some times. History of Present Illness     Shay Andrade is an 78 y.o. male previously seen for left sided headache and drainage with suspected acute otitis media by Dr. Arron Lesches. Interval History 10/5/2020: Since last visit, states significantly improved otalgia without otorrhea; denies headache. Continues to have left-sided aural fullness and left-sided hearing loss. No vertigo, fevers, chills, night sweats. Patient is a diabetic, last A1c 6.5 on 9/20/2019. Interval history 10/15/2020: Since last visit, patient states minimal otorrhea from the left ear and resolution of pruritus. Continues to state poor hearing in the left ear. Intermittent head congestion, has been taking Excedrin which improves this. Interval history 12/15/2020: No interval pruritus, pain, otorrhea. Continues to have poor hearing in the left ear-states he is left-handed, and has difficulty using the telephone in the left ear. Interval history 3/9/2021: Patient with left ear otorrhea/pain that has been ongoing for the past two weeks. Improvement with course of Augmentin, however continued mild pain and drainage. Interval history 4/20/2021: Left ear otorrhea two weeks ago, improved with amoxicillin. No recent drainage.      Interval history 9/13/2021: Patient states throbbing pain in the left ear, ongoing for the past 2-3 weeks, with radiation down the mandible and into the temporal scalp.  4-5/10 intensity, no otorrhea or hearing loss    Past Medical History     Past Medical History:   Diagnosis Date    Anemia     Angina     Arthritis  CAD (coronary artery disease)     [facility]     CKD (chronic kidney disease) stage 3, GFR 30-59 ml/min ([facility])     Clostridium difficile diarrhea 3/16/12; 12    positive stool toxin    Diabetes mellitus ([facility]     Diabetic neuropathy ([facility]     feet and legs    Disease of blood and blood forming organ     Dizziness     When he moves his head/ loses his balance    GERD (gastroesophageal reflux disease)     gastric ulcer    Headache     Hearing loss     Hyperlipidemia     Hypertension     Kidney stone     Refusal of blood transfusions as patient is Bahai     Sleep apnea     Tinnitus     Venous ulcer ([facility]     LLE    Wound, open 2012       Past Surgical History     Past Surgical History:   Procedure Laterality Date    CARDIAC SURGERY      Dec 27 2010, triple bypass    CHOLECYSTECTOMY  2011    HERNIA REPAIR  age1    OTHER SURGICAL HISTORY  11 REPAIR LOWER STERNAL INCISION, REMOVAL OF ONE STERNAL WIRE,    OTHER SURGICAL HISTORY  10/10/2014    phacoemulsification of cataract with intraocular lens implant left eye    UPPER GASTROINTESTINAL ENDOSCOPY         Family History     Family History   Problem Relation Age of Onset    Heart Disease Mother     Heart Disease Father     Cancer Father     Diabetes Brother     Diabetes Brother        Social History     Social History     Tobacco Use    Smoking status: Former Smoker     Packs/day: 1.00     Years: 16.00     Pack years: 16.00     Quit date: 1/10/1988     Years since quittin.6    Smokeless tobacco: Never Used    Tobacco comment: 22 yrs ago   Vaping Use    Vaping Use: Never used   Substance Use Topics    Alcohol use: No     Alcohol/week: 0.0 standard drinks    Drug use: No        Allergies     Allergies   Allergen Reactions    Amitriptyline Other (See Comments)     tremor    Celecoxib      Hyperkalemia    Cymbalta [Duloxetine Hcl] Other (See Comments)     Tremors and slurred speech    Elavil [Amitriptyline Hcl]      tremor    Gabapentin      Tremor at high dose    Nsaids      Gastric ulcer       Medications     Current Outpatient Medications   Medication Sig Dispense Refill    carvedilol (COREG) 12.5 MG tablet Take 1 tablet by mouth 2 times daily (with meals) 60 tablet 3    spironolactone (ALDACTONE) 25 MG tablet Take 1 tablet by mouth daily 30 tablet 3    metOLazone (ZAROXOLYN) 2.5 MG tablet Take 1 tablet by mouth every other day 15 tablet 3    torsemide (DEMADEX) 100 MG tablet Take 0.5 tablets by mouth 2 times daily 30 tablet 3    atorvastatin (LIPITOR) 40 MG tablet Take 1 tablet by mouth nightly 30 tablet 3    oxyCODONE-acetaminophen (PERCOCET) 5-325 MG per tablet Take 1 tablet by mouth 4 times daily for 30 days. 120 tablet 0    linaCLOtide (LINZESS PO) Take by mouth      clotrimazole (LOTRIMIN) 1 % external solution Apply 4 drops to the left ear twice daily (morning and evening) for 10 days 29.57 mL 1    acetaminophen (TYLENOL) 500 MG tablet Take 2 tablets by mouth 4 times daily as needed for Pain 60 tablet 0    hydrocortisone (ANUSOL-HC) 2.5 % rectal cream Place rectally 2 times daily.  1 Tube 0    senna (SENOKOT) 8.6 MG TABS tablet Take 1 tablet by mouth 2 times daily 120 tablet 0    colchicine (COLCRYS) 0.6 MG tablet Take 1 tablet by mouth daily 30 tablet 3    docusate sodium (COLACE, DULCOLAX) 100 MG CAPS Take 100 mg by mouth 2 times daily      insulin aspart (NOVOLOG) 100 UNIT/ML injection vial Inject into the skin 3 times daily (before meals) 50 units with breakfast, 20 units with lunch and 60 units with dinner      allopurinol (ZYLOPRIM) 100 MG tablet Take 300 mg by mouth       insulin glargine (LANTUS) 100 UNIT/ML injection vial Inject 55 Units into the skin 2 times daily (Patient taking differently: Inject 80 Units into the skin 2 times daily ) 1 vial 1    Compression Stockings MISC by Does not apply route 2 pair, knee high compression stockings, anterior rhinoscopy there is no epistaxis, nasal mucosa within normal limits, no purulent drainage  EARS: Normal external appearance -tenderness to palpation of the left temporomandibular joint both externally and within the external auditory canal; on portable otomicroscopy:  -Right ear: External auditory canal without stenosis, tympanic membrane clear, no middle ear effusions or retractions  -Left ear: External auditory canal without stenosis, left central tympanic membrane perforation noted, middle ear appears clear  FACE: 1/6 House-Brackmann Scale, symmetric, sensation equal bilaterally  ORAL CAVITY: No masses or lesions palpated, uvula is midline, moist mucous membranes, tongue midline/mobile  NECK: Normal range of motion, no thyromegaly, trachea is midline, no lymphadenopathy, no neck masses, no crepitus  CHEST: Normal respiratory effort, no retractions, breathing comfortably  SKIN: No rashes, normal appearing skin, no evidence of skin lesions/tumors  NEURO: CN 2, 3, 4, 5, 6, 7, 11, 12 intact bilaterally       Procedure     Binocular Otomicroscopy     Pre op: Left otitis media  Post op: Left otitis media, tympanic membrane perforation  Procedure : Binocular otomicroscopy  Surgeon: RODRIGUEZ Bhakta  Estimated Blood Loss: None    After obtaining consent, the patient was placed in the examination chair in the reclined position.      -Left ear: External auditory canal with slight stenosis, purulent otorrhea noted, suctioned, tympanic membrane showing 40-50% central perforation of the anterior daksha-tympanum   * Patient tolerated the procedure well with no complications    Assessment and Plan     1. Mixed conductive and sensorineural hearing loss of left ear with restricted hearing of right ear  Patient with mixed hearing loss of left ear, with significant conductive gap-likely due to left tympanic membrane perforation but may also have an element of ossicular chain discontinuity/fixation.   His tympanic membrane remains perforated, and we discussed tympanoplasty with possible ossicular chain reconstruction. The patient remains concerned about the ongoing pandemic, given his history of congestive heart failure. He would like to wait several months before proceeding with surgery, and we will see him back in 6 months for further evaluation    2. Tympanic membrane perforation, left  Left tympanic membrane, 40-50%, central-no cholesteatoma visualized in the middle ear     3. TMJ arthralgia  Patient with suspected left temporomandibular joint arthralgia-middle ear appears clear, no chronic suppurative otitis media noted, no cholesteatoma. Conservative measures outlined. He may follow-up with us on an as-needed basis. No follow-ups on file. Reina Gomez MD  [address] [place] [address],[address]  Department of Otolaryngology/Head and Neck Surgery  9/13/21    I have performed a head and neck physical exam personally or was physically present during the key or critical portions of the service. Medical Decision Making: The following items were considered in medical decision making:  Independent review of images  Review / order clinical lab tests  Review / order radiology tests  Decision to obtain old records  Review and summation of old records as accessed through [facility] (a summary of my findings in these old records: None)     Portions of this note were dictated using Dragon.  There may be linguistic errors secondary to the use of this program.

## 2021-09-29 PROBLEM — G89.29 CHRONIC NECK PAIN: Status: ACTIVE | Noted: 2021-09-29

## 2021-09-29 PROBLEM — M54.2 CHRONIC NECK PAIN: Status: ACTIVE | Noted: 2021-09-29

## 2021-11-22 ENCOUNTER — HOSPITAL ENCOUNTER (OUTPATIENT)
Dept: MRI IMAGING | Age: 66
Discharge: HOME | End: 2021-11-22
Payer: MEDICARE

## 2021-11-22 DIAGNOSIS — M54.2 CERVICALGIA: ICD-10-CM

## 2021-11-22 PROCEDURE — 72141 MRI NECK SPINE W/O DYE: CPT

## 2021-11-24 ENCOUNTER — TELEPHONE (OUTPATIENT)
Dept: PULMONOLOGY | Age: 66
End: 2021-11-24

## 2021-11-24 NOTE — TELEPHONE ENCOUNTER
Patient did not show for 6 month follow up  with Yinka Telles on 11/24/21. Reason:  n/a    This is patient's first no show. Patient was ano show on: n/a. Patient did not reschedule.   Reschedule date:  n/a

## 2021-12-02 NOTE — TELEPHONE ENCOUNTER
Patient states that he does not have transportation and cannot find any one to bring him to the office. Offered patient a VV and he declined at this time. States he will call at the start of the year to schedule.

## 2021-12-02 NOTE — TELEPHONE ENCOUNTER
Noted. Patient did not keep follow up appointment as was recommended. Please schedule a follow up visit for this patient when he calls requesting such appointment. Please call patient and inform should use BIPAP at least 4 hours a night and ideally all night every night for maximum benefit     -[employer] recently recalled some Pap units. Patient should call DME/ to see if his unit is on recall and register it if so. Patient has severe HENNA    At this time, if patients have moderate to severe sleep apnea or have severe daytime sleepiness, it is recommended continue therapy until the machine can be replaced. Based upon the information we have so far, it appears the risk of stopping therapy may be higher than the risks associated with foam degradation. However, there are still many unknown variables and each patient will have to make a final determination on his or her own. Please only use cleaning methods recommended by .     I am more than happy to further discuss risks/benefits of continuing or discontinuing PAP and/or alternative treatments if patient would like appt to discuss

## 2021-12-10 ENCOUNTER — VIRTUAL VISIT (OUTPATIENT)
Dept: PULMONOLOGY | Age: 66
End: 2021-12-10
Payer: MEDICARE

## 2021-12-10 DIAGNOSIS — Z72.821 POOR SLEEP HYGIENE: ICD-10-CM

## 2021-12-10 DIAGNOSIS — Z71.89 ENCOUNTER FOR BIPAP USE COUNSELING: ICD-10-CM

## 2021-12-10 DIAGNOSIS — E66.01 OBESITY, CLASS III, BMI 40-49.9 (MORBID OBESITY) (HCC): ICD-10-CM

## 2021-12-10 DIAGNOSIS — G47.10 HYPERSOMNIA: ICD-10-CM

## 2021-12-10 DIAGNOSIS — Z78.9 DIFFICULTY WITH BIPAP USE: ICD-10-CM

## 2021-12-10 DIAGNOSIS — R53.83 OTHER FATIGUE: ICD-10-CM

## 2021-12-10 DIAGNOSIS — G47.33 SEVERE OBSTRUCTIVE SLEEP APNEA: Primary | ICD-10-CM

## 2021-12-10 PROCEDURE — 99443 PR PHYS/QHP TELEPHONE EVALUATION 21-30 MIN: CPT | Performed by: NURSE PRACTITIONER

## 2021-12-10 ASSESSMENT — SLEEP AND FATIGUE QUESTIONNAIRES
HOW LIKELY ARE YOU TO NOD OFF OR FALL ASLEEP WHILE LYING DOWN TO REST IN THE AFTERNOON WHEN CIRCUMSTANCES PERMIT: 3
HOW LIKELY ARE YOU TO NOD OFF OR FALL ASLEEP WHEN YOU ARE A PASSENGER IN A CAR FOR AN HOUR WITHOUT A BREAK: 0
ESS TOTAL SCORE: 15
HOW LIKELY ARE YOU TO NOD OFF OR FALL ASLEEP IN A CAR, WHILE STOPPED FOR A FEW MINUTES IN TRAFFIC: 0
HOW LIKELY ARE YOU TO NOD OFF OR FALL ASLEEP WHILE SITTING INACTIVE IN A PUBLIC PLACE: 2
HOW LIKELY ARE YOU TO NOD OFF OR FALL ASLEEP WHILE WATCHING TV: 3
HOW LIKELY ARE YOU TO NOD OFF OR FALL ASLEEP WHILE SITTING AND TALKING TO SOMEONE: 2
HOW LIKELY ARE YOU TO NOD OFF OR FALL ASLEEP WHILE SITTING QUIETLY AFTER LUNCH WITHOUT ALCOHOL: 2
HOW LIKELY ARE YOU TO NOD OFF OR FALL ASLEEP WHILE SITTING AND READING: 3

## 2021-12-10 NOTE — PROGRESS NOTES
Patient ID: Carina Clements is a 78 y.o. male who is being seen today for   Chief Complaint   Patient presents with    Sleep Apnea     increased daytime sleepiness, dozing off          HPI:     Carina Clements is a 78 y.o. male for televideo appointment via video and audio doxy. me virtual visit for HENNA follow up. States he was in the hospital and he can home with continuous oxygen. States he is not using BIPAP states he is using O2. States he is not tolerating the BIPAP.     +daytime sleepiness. No driving. +fatigue. Bedtime and wake time varies due to pain, states he sleeps in recliner. Occasional naps during the day. No headache in am. No weight gain. 0-1 caffienated beverages during the day. ESS is 15. Previous 5/27/21  Shay Reynolds is a 78 y.o. male for televideo appointment via video and audio doxy. me virtual visit for HENNA follow up. States he is doing okay with BIPAP. States his circumstances are difficult with his medical conditions and he tries to use BIPAP as much as he can. States he goes from bedroom to chair to sleep due to pain. Patient is using BiPAP   2-5 hrs/night. Using humidifier. No snoring on BiPAP. The pressure is well tolerated. The mask is comfortable-nasal pillows. No mask leak. Some daytime sleepiness. No driving.  +fatigue. Bedtime and wakes times vary due to pain, cannot sleep more than a few hours at time due to the pain. STates is going to have MRI in a few weeks. Sleep onset is 5-10 minutes. Wakes up 3-4 times at night total.  STates has to sleep in increments due to pain. 3-4 nocturia. It takes few-unknown minutes to fall back a sleep. Multiple naps during the day. No headache in am. No weight gain. 0-1 caffienated beverages during the day. ESS is 12      Previous HPI 4/13/21  Carina Clements is a 78 y.o. male for telephone only virtual visit for HENNA follow up. He received new BIPAP after last visit. States he got new BIPAP. States he is doing well with it.       Patient is using BiPAP   3-6 hrs/night. Using humidifier. No snoring on BiPAP. The pressure is well tolerated. The mask is comfortable- nasal pillows. No mask leak. Some daytime sleepiness. No driving. Some fatigue. Bedtimes and wakes times vary due to pain. Sleep onset is 5-10 minutes. Wakes up 3-4 times at night total.  STates has to sleep in increments due to pain. 3-4 nocturia. It takes few- unknown minutes to fall back a sleep. Multiple naps during the day. No headache specifically in am.  No weight gain. 0-1 caffienated beverages during the day. No alcohol. ESS is 11      Previous HPI 2/1/21  Meryle Nay is a 78 y.o. male for telephone only virtual visit for HENNA follow up. States his BIPAP stopped working about 1 month ago (he does have a loaner currently from [employer]). States on/off button would not work. States he sent it away to be repaied. He states he was told the blower went out and was told it would be about $240 to fix it. States he is unable to pay that much money for the repair. He is requesting new BIPAP. He has had current BIPAP about 3.5 years. States he cannot sleep without BIPAP. He is using and benefiting from BIPAP. Patient is using BiPAP 4-6 hrs/night. Using humidifier. No snoring on BiPAP. The pressure is well tolerated. The mask is comfortable- nasal pillows. No mask leak. Some daytime sleepiness, medications, poor sleep hygiene. No driving. +fatigue. Bedtimes and wakes times vary due to pain. Sleep onset is 5-10 minutes. Wakes up 3-4 times at night total.  STates has to sleep in increments due to pain. 3-4 nocturia. It takes few- unknown minutes to fall back a sleep. Multiple naps during the day. Rare headache in am. No weight gain. 1-2 caffienated beverages during the day. No alcohol. ESS is 10. Previous HPI 4/8/20  Meryle Nay is a 78 y.o. male for telephone only virtual visit for HENNA follow up. States he is doing better with BIPAP.   Patient is using BiPAP 3-5 hrs/night. Using humidifier. No snoring on BiPAP. The pressure is well tolerated. The mask is comfortable-nasal pillows. No mask leak. States he might want to change DME. Some daytime sleepiness- pain meds, chronic conditions contributing. No nodding off when driving. No dry nose or throat.  +fatigue. Bedtime is variable. States does not keep a set schedule. States pain pain can affect his sleep, can only sleep 3-4 hours at a time due to pain. Sleep onset is few minutes. Wakes up 3 times at night total. 3 nocturia. It takes few- unknown minutes to fall back a sleep. Few naps during the day. No headache in am. No weight gain. No caffienated beverages during the day. No alcohol. ESS is 9- discussed with patient. Previous HPI 9/3/20  Eleazar Chatterjee is a 78 y.o. male in office for HENNA follow up. States he is doing okay with BIPAP. States he has to switch BIPAP from bed to living room due to sleep schedule is broken due to pain. States he is going to get an injection this week and hopes that will help the pain. States he cannot stay in bed for more than 3 hours due to pain, then goes to chair in living room (states chair will not fit in bedroom). Patient is using BiPAP 2-5 hrs/night. Using humidifier. No snoring on BiPAP. The pressure is well tolerated. The mask is comfortable-nasal pillows. No mask leak. Some daytime sleepiness. No driving. No dry nose or throat. Some fatigue. States sleep schedule varies, has to get up do to back pain. .  States he did get a new bed.   +naps during the day. No headache in am. No weight gain. No caffienated beverages during the day. No alcohol. ESS is 10.         Sleep Medicine 12/10/2021 5/27/2021 4/13/2021 2/1/2021 4/8/2020 4/8/2020 9/3/2019   Sitting and reading 3 2 2 2 2 2 2   Watching TV 3 3 2 3 2 2 3   Sitting, inactive in a public place (e.g. a theatre or a meeting) 2 0 0 0 0 0 0   As a passenger in a car for an hour without a break 0 1 0 0 0 0 0   Lying down to rest in the afternoon when circumstances permit 3 3 2 3 3 3 3   Sitting and talking to someone 2 0 2 0 0 0 0   Sitting quietly after a lunch without alcohol 2 3 3 2 2 2 2   In a car, while stopped for a few minutes in traffic 0 0 0 0 0 2 0   Total score 15 12 11 10 9 11 10   Neck circumference - - - - - - 20.5       Past Medical History:  Past Medical History:   Diagnosis Date    Anemia     Angina     Arthritis     CAD (coronary artery disease)     [facility]estive [facility] failure[facility]     CKD (chronic kidney disease) stage 3, GFR 30-59 ml/min ([facility])     Clostridium difficile diarrhea 3/16/12; 2/29/12    positive stool toxin    Diabetes mellitus ([facility]     Diabetic neuropathy ([facility])     feet and legs    Disease of blood and blood forming organ     Dizziness     When he moves his head/ loses his balance    GERD (gastroesophageal reflux disease)     gastric ulcer    Headache     Hearing loss     Hyperlipidemia     Hypertension     Kidney stone 2002    Refusal of blood transfusions as patient is Christianity     Sleep apnea     Tinnitus     Venous ulcer ([facility]     LLE    Wound, open 1/13/2012       Past Surgical History:        Procedure Laterality Date    CARDIAC SURGERY      Dec 27 2010, triple bypass    CHOLECYSTECTOMY  9/2011    HERNIA REPAIR  age1    OTHER SURGICAL HISTORY  11-16-11 REPAIR LOWER STERNAL INCISION, REMOVAL OF ONE STERNAL WIRE,    OTHER SURGICAL HISTORY  10/10/2014    phacoemulsification of cataract with intraocular lens implant left eye    UPPER GASTROINTESTINAL ENDOSCOPY         Allergies:  is allergic to amitriptyline, celecoxib, cymbalta [duloxetine hcl], elavil [amitriptyline hcl], gabapentin, and nsaids. Social History:    TOBACCO:   reports that he quit smoking about 33 years ago. He has a 16.00 pack-year smoking history. He has never used smokeless tobacco.  ETOH:   reports no history of alcohol use.     Family History:       Problem Relation Age of Onset    Heart Disease Mother     Heart Disease Father     Cancer Father     Diabetes Brother     Diabetes Brother        Current Medications:    Current Outpatient Medications:     [START ON 12/21/2021] oxyCODONE-acetaminophen (PERCOCET) 5-325 MG per tablet, Take 1 tablet by mouth 4 times daily for 30 days. , Disp: 120 tablet, Rfl: 0    carvedilol (COREG) 12.5 MG tablet, Take 1 tablet by mouth 2 times daily (with meals), Disp: 60 tablet, Rfl: 3    spironolactone (ALDACTONE) 25 MG tablet, Take 1 tablet by mouth daily, Disp: 30 tablet, Rfl: 3    metOLazone (ZAROXOLYN) 2.5 MG tablet, Take 1 tablet by mouth every other day, Disp: 15 tablet, Rfl: 3    torsemide (DEMADEX) 100 MG tablet, Take 0.5 tablets by mouth 2 times daily, Disp: 30 tablet, Rfl: 3    atorvastatin (LIPITOR) 40 MG tablet, Take 1 tablet by mouth nightly, Disp: 30 tablet, Rfl: 3    linaCLOtide (LINZESS PO), Take by mouth, Disp: , Rfl:     clotrimazole (LOTRIMIN) 1 % external solution, Apply 4 drops to the left ear twice daily (morning and evening) for 10 days, Disp: 29.57 mL, Rfl: 1    acetaminophen (TYLENOL) 500 MG tablet, Take 2 tablets by mouth 4 times daily as needed for Pain, Disp: 60 tablet, Rfl: 0    hydrocortisone (ANUSOL-HC) 2.5 % rectal cream, Place rectally 2 times daily. , Disp: 1 Tube, Rfl: 0    senna (SENOKOT) 8.6 MG TABS tablet, Take 1 tablet by mouth 2 times daily, Disp: 120 tablet, Rfl: 0    colchicine (COLCRYS) 0.6 MG tablet, Take 1 tablet by mouth daily, Disp: 30 tablet, Rfl: 3    docusate sodium (COLACE, DULCOLAX) 100 MG CAPS, Take 100 mg by mouth 2 times daily, Disp: , Rfl:     insulin aspart (NOVOLOG) 100 UNIT/ML injection vial, Inject into the skin 3 times daily (before meals) 50 units with breakfast, 20 units with lunch and 60 units with dinner, Disp: , Rfl:     allopurinol (ZYLOPRIM) 100 MG tablet, Take 300 mg by mouth , Disp: , Rfl:     insulin glargine (LANTUS) 100 UNIT/ML injection vial, Inject 55 Units into the skin 2 times daily (Patient taking differently: Inject 80 Units into the skin 2 times daily ), Disp: 1 vial, Rfl: 1    pregabalin (LYRICA) 50 MG capsule, Take 1 capsule by mouth Daily with supper, Disp: 60 capsule, Rfl: 3    fluticasone (FLONASE) 50 MCG/ACT nasal spray, 1 spray by Nasal route nightly as needed. , Disp: , Rfl:     aspirin 81 MG chewable tablet, Take 1 tablet by mouth daily. , Disp: 30 tablet, Rfl:     isosorbide mononitrate (IMDUR) 30 MG CR tablet, Take 1 tablet by mouth daily. , Disp: 30 tablet, Rfl: 0    ferrous sulfate 325 (65 FE) MG tablet, Take 325 mg by mouth 3 times daily (with meals). , Disp: , Rfl:     omeprazole (PRILOSEC) 20 MG capsule, Take 20 mg by mouth 2 times daily. , Disp: , Rfl:     oxyCODONE-acetaminophen (PERCOCET) 5-325 MG per tablet, Take 1 tablet by mouth 4 times daily for 30 days. , Disp: 120 tablet, Rfl: 0    naloxone 4 MG/0.1ML LIQD nasal spray, 1 spray by Nasal route as needed for Opioid Reversal, Disp: 1 each, Rfl: 0    Compression Stockings MISC, by Does not apply route 2 pair, knee high compression stockings, fitted/ zippered, Disp: 2 each, Rfl: 1    silver sulfADIAZINE (SILVADENE) 1 % cream, Apply to BL lower leg ulcers daily, Disp: 20 g, Rfl: 0    nitroGLYCERIN (NITROSTAT) 0.4 MG SL tablet, Place 1 tablet under the tongue every 5 minutes as needed, Disp: 25 tablet, Rfl: 1      Objective:   PHYSICAL EXAM:    There were no vitals taken for this visit. DATA:   9/28 17 PSG AHI 35.4/REM AHI 45.7, PLMS 38, low SPO2 73%  11/16/17 titration-controlled sleep-related breathing with BiPAP, PLMS 86.8 recommendations BiPAP 14/8 cm H2O  8/25/21 echo EF 55-60%    BIPAP compliance data:  Compliance download report from 3/4/18 to 4/2/18 reviewed today by me and showed patient is using machine 3:23 hrs/night with 30% compliance and AHI 0.8 within this time frame. 9/30days with greater than 4 hours of machine use. 30/30days with any BIPAP use  BIPAP 14/8 cm H20    Compliance download report from 4/17/18 to 5/16/18 showed patient is using machine 5:27 hrs/night with 63.3% compliance and AHI 5.1 within this time frame. 19/30days with greater than 4 hours of machine use. BIPAP 14/8 cm H20    Compliance download report from 6/11/18 to 7/10/18 showed patient is using machine 6:42 hrs/night with 100% compliance and AHI 1.4 within this time frame. 30/30days with greater than 4 hours of machine use. BIPAP 14/8 cm H20    Compliance download report from 3/2/19 to 3/31/19 showed patient is using machine 14 min/night with 0% compliance and AHI 5.5 within this time frame. 0/30days with greater than 4 hours of machine use. BIPAP 14/8 cm H20    Compliance download report from 8/4/19 to 9/2/19 showed patient is using machine 2:47 hrs/night with 10% compliance and AHI 1.1 within this time frame. 3/30days with greater than 4 hours of machine use. 29/30 days with any BIPAP use. BIPAP 14/8 cm H20    Compliance download report from 3/4/20 to 4/3/20 reviewed today by me and showed patient is using machine 4:58 hrs/night with 74.2% compliance and AHI 1.2 within this time frame. 21/31 days with greater than 4 hours of machine use. BIPAP 14/8 cm H20    2/1/2021-unable to obtain BiPAP download report. Patient's current BiPAP is broken, he does have loaner from DME. New BIPAP:  Compliance download report from 3/6/21 to 4/4/21 reviewed today by me and showed patient is using machine 3:50 hrs/night with 46% compliance and AHI 1.2 within this time frame. 14/30days with greater than 4 hours of machine use. BIPAP 14/6 cm H20     Compliance download report from 4/25/21 to 5/24/21 reviewed today by me and showed patient is using machine 3:01 hrs/night with 27% compliance and AHI 1.1 within this time frame. 8/30days with greater than 4 hours of machine use. 30/30 days with any BiPAP use  BIPAP 14/6 cm H20     12/10/2021-no BiPAP use in past 30+ days      Assessment:      Severe HENNA.   BIPAP 14/8 cm H2O. patient not tolerating BiPAP, states he is using O2 at night currently  · Hypersomnia- sedating medications, poor sleep hygiene, co morbid conditions, likely contributing-improving. · Irregular sleep pattern due to pain  · Poor sleep hygiene  · Obesity   · Cardiomyopathy, CAD s/p CABG x3, pulmonary HTN, neuropathy, DM, HTN  · Chronic pain- followed by pain management. Plan:      -BiPAP titration as patient is not tolerating BiPAP and to evaluate for O2 needs if any  -Discussed severity of sleep apnea and importance of BiPAP use  -Supply order to DME of patient's choice  -Chin strap if not using full face mask  - Advised to use BiPAP 6-8 hrs at night and during naps-continue to increase use. - Replacement of mask, tubing, head straps every 3-6 months or sooner if damaged. - Patient instructed to contact DME company for any mask, tubing or machine trouble shooting if problems arise.  - Sleep hygiene  -Continue to work with pain management for chronic pain  -Cognitive behavioral therapy was discussed with patient including stimulus control and sleep restriction   -Recommend set bed/wake times, no naps during the daytime. Use BiPAP when sleeping.  - Avoid sedatives, alcohol and caffeinated drinks at bed time. - Patient counseled to never drive or operate heavy machinery while fatigue, drowsy or sleepy- patient verbalized understanding and agrees. - Weight loss is recommended as a long-term intervention.    - Complications of HENNA if not treated were discussed with patient patient, including: systemic hypertension, pulmonary hypertension, cardiovascular morbidities, car accidents and all cause mortality.  -Patient education regarding sleep tips and PAP cleaning recommendations     Kristian Meadows is a 78 y.o. male evaluated via telephone on 12/10/2021.       Consent:  He and/or health care decision maker is aware that that he may receive a bill for this telephone service, depending on his insurance coverage, and has provided verbal consent to proceed: NA - Consent obtained within past 12 months      I affirm this is a Patient Initiated Episode with a Patient who has not had a related appointment within my department in the past 7 days or scheduled within the next 24 hours. Patient identification was verified at the start of the visit: Yes    Total Time: minutes: 21-30 minutes    The visit was conducted pursuant to the emergency declaration under the [address] [place] [address] and the [employer] and [employer] Act. Patient identification was verified, and a caregiver was present when appropriate. The patient was located in a state where the provider was credentialed to provide care.     Note: not billable if this call serves to triage the patient into an appointment for the relevant concern      Nga Can, PATRICK - CNP

## 2022-01-13 ENCOUNTER — APPOINTMENT (OUTPATIENT)
Dept: GENERAL RADIOLOGY | Age: 67
DRG: 291 | End: 2022-01-13
Payer: MEDICARE

## 2022-01-13 ENCOUNTER — HOSPITAL ENCOUNTER (INPATIENT)
Age: 67
LOS: 4 days | Discharge: HOME HEALTH | DRG: 291 | End: 2022-01-17
Attending: EMERGENCY MEDICINE | Admitting: INTERNAL MEDICINE
Payer: MEDICARE

## 2022-01-13 ENCOUNTER — APPOINTMENT (OUTPATIENT)
Dept: CT IMAGING | Age: 67
DRG: 291 | End: 2022-01-13
Payer: MEDICARE

## 2022-01-13 DIAGNOSIS — J96.01 ACUTE RESPIRATORY FAILURE WITH HYPOXIA (HCC): Primary | ICD-10-CM

## 2022-01-13 PROBLEM — I50.31 ACUTE DIASTOLIC CHF (CONGESTIVE HEART FAILURE) (HCC): Status: ACTIVE | Noted: 2022-01-13

## 2022-01-13 PROBLEM — R06.00 DYSPNEA: Status: ACTIVE | Noted: 2022-01-13

## 2022-01-13 LAB
A/G RATIO: 1 (ref 1.1–2.2)
ALBUMIN SERPL-MCNC: 3.7 G/DL (ref 3.4–5)
ALP BLD-CCNC: 98 U/L (ref 40–129)
ALT SERPL-CCNC: 11 U/L (ref 10–40)
ANION GAP SERPL CALCULATED.3IONS-SCNC: 12 MMOL/L (ref 3–16)
AST SERPL-CCNC: 27 U/L (ref 15–37)
BASOPHILS ABSOLUTE: 0 K/UL (ref 0–0.2)
BASOPHILS RELATIVE PERCENT: 0.5 %
BILIRUB SERPL-MCNC: 0.5 MG/DL (ref 0–1)
BUN BLDV-MCNC: 63 MG/DL (ref 7–20)
CALCIUM SERPL-MCNC: 9.3 MG/DL (ref 8.3–10.6)
CHLORIDE BLD-SCNC: 94 MMOL/L (ref 99–110)
CO2: 28 MMOL/L (ref 21–32)
CREAT SERPL-MCNC: 1.4 MG/DL (ref 0.8–1.3)
EOSINOPHILS ABSOLUTE: 0.2 K/UL (ref 0–0.6)
EOSINOPHILS RELATIVE PERCENT: 2.3 %
GFR AFRICAN AMERICAN: >60
GFR NON-AFRICAN AMERICAN: 50
GLUCOSE BLD-MCNC: 190 MG/DL (ref 70–99)
GLUCOSE BLD-MCNC: 309 MG/DL (ref 70–99)
HCT VFR BLD CALC: 33.1 % (ref 40.5–52.5)
HEMOGLOBIN: 10.7 G/DL (ref 13.5–17.5)
LYMPHOCYTES ABSOLUTE: 0.5 K/UL (ref 1–5.1)
LYMPHOCYTES RELATIVE PERCENT: 7.2 %
MCH RBC QN AUTO: 28.8 PG (ref 26–34)
MCHC RBC AUTO-ENTMCNC: 32.4 G/DL (ref 31–36)
MCV RBC AUTO: 89 FL (ref 80–100)
MONOCYTES ABSOLUTE: 0.4 K/UL (ref 0–1.3)
MONOCYTES RELATIVE PERCENT: 6 %
NEUTROPHILS ABSOLUTE: 6 K/UL (ref 1.7–7.7)
NEUTROPHILS RELATIVE PERCENT: 84 %
PDW BLD-RTO: 17.8 % (ref 12.4–15.4)
PERFORMED ON: ABNORMAL
PLATELET # BLD: 186 K/UL (ref 135–450)
PMV BLD AUTO: 8.7 FL (ref 5–10.5)
POTASSIUM SERPL-SCNC: 5.4 MMOL/L (ref 3.5–5.1)
PRO-BNP: 1606 PG/ML (ref 0–124)
RAPID INFLUENZA  B AGN: NEGATIVE
RAPID INFLUENZA A AGN: NEGATIVE
RBC # BLD: 3.72 M/UL (ref 4.2–5.9)
SARS-COV-2, NAAT: NOT DETECTED
SODIUM BLD-SCNC: 134 MMOL/L (ref 136–145)
TOTAL PROTEIN: 7.3 G/DL (ref 6.4–8.2)
TROPONIN: 0.04 NG/ML
WBC # BLD: 7.2 K/UL (ref 4–11)

## 2022-01-13 PROCEDURE — 2580000003 HC RX 258: Performed by: INTERNAL MEDICINE

## 2022-01-13 PROCEDURE — 83880 ASSAY OF NATRIURETIC PEPTIDE: CPT

## 2022-01-13 PROCEDURE — 87635 SARS-COV-2 COVID-19 AMP PRB: CPT

## 2022-01-13 PROCEDURE — 80053 COMPREHEN METABOLIC PANEL: CPT

## 2022-01-13 PROCEDURE — 93005 ELECTROCARDIOGRAM TRACING: CPT | Performed by: EMERGENCY MEDICINE

## 2022-01-13 PROCEDURE — 87804 INFLUENZA ASSAY W/OPTIC: CPT

## 2022-01-13 PROCEDURE — 83036 HEMOGLOBIN GLYCOSYLATED A1C: CPT

## 2022-01-13 PROCEDURE — 6360000004 HC RX CONTRAST MEDICATION: Performed by: NURSE PRACTITIONER

## 2022-01-13 PROCEDURE — 6360000002 HC RX W HCPCS: Performed by: INTERNAL MEDICINE

## 2022-01-13 PROCEDURE — 84484 ASSAY OF TROPONIN QUANT: CPT

## 2022-01-13 PROCEDURE — 94761 N-INVAS EAR/PLS OXIMETRY MLT: CPT

## 2022-01-13 PROCEDURE — 85025 COMPLETE CBC W/AUTO DIFF WBC: CPT

## 2022-01-13 PROCEDURE — 1200000000 HC SEMI PRIVATE

## 2022-01-13 PROCEDURE — 99284 EMERGENCY DEPT VISIT MOD MDM: CPT

## 2022-01-13 PROCEDURE — 6370000000 HC RX 637 (ALT 250 FOR IP): Performed by: INTERNAL MEDICINE

## 2022-01-13 PROCEDURE — 2700000000 HC OXYGEN THERAPY PER DAY

## 2022-01-13 PROCEDURE — 71260 CT THORAX DX C+: CPT

## 2022-01-13 PROCEDURE — 71045 X-RAY EXAM CHEST 1 VIEW: CPT

## 2022-01-13 RX ORDER — NICOTINE POLACRILEX 4 MG
15 LOZENGE BUCCAL PRN
Status: DISCONTINUED | OUTPATIENT
Start: 2022-01-13 | End: 2022-01-17 | Stop reason: HOSPADM

## 2022-01-13 RX ORDER — DOCUSATE SODIUM 100 MG/1
100 CAPSULE, LIQUID FILLED ORAL 2 TIMES DAILY
Status: DISCONTINUED | OUTPATIENT
Start: 2022-01-13 | End: 2022-01-17 | Stop reason: HOSPADM

## 2022-01-13 RX ORDER — ACETAMINOPHEN 650 MG/1
650 SUPPOSITORY RECTAL EVERY 6 HOURS PRN
Status: DISCONTINUED | OUTPATIENT
Start: 2022-01-13 | End: 2022-01-17 | Stop reason: HOSPADM

## 2022-01-13 RX ORDER — CALCIUM CARBONATE 200(500)MG
1000 TABLET,CHEWABLE ORAL 3 TIMES DAILY PRN
Status: DISCONTINUED | OUTPATIENT
Start: 2022-01-13 | End: 2022-01-17 | Stop reason: HOSPADM

## 2022-01-13 RX ORDER — ASPIRIN 81 MG/1
81 TABLET, CHEWABLE ORAL DAILY
Status: DISCONTINUED | OUTPATIENT
Start: 2022-01-13 | End: 2022-01-17 | Stop reason: HOSPADM

## 2022-01-13 RX ORDER — PANTOPRAZOLE SODIUM 40 MG/1
40 TABLET, DELAYED RELEASE ORAL
Status: DISCONTINUED | OUTPATIENT
Start: 2022-01-14 | End: 2022-01-17 | Stop reason: HOSPADM

## 2022-01-13 RX ORDER — SODIUM CHLORIDE 9 MG/ML
25 INJECTION, SOLUTION INTRAVENOUS PRN
Status: DISCONTINUED | OUTPATIENT
Start: 2022-01-13 | End: 2022-01-17 | Stop reason: HOSPADM

## 2022-01-13 RX ORDER — DEXTROSE MONOHYDRATE 50 MG/ML
100 INJECTION, SOLUTION INTRAVENOUS PRN
Status: DISCONTINUED | OUTPATIENT
Start: 2022-01-13 | End: 2022-01-17 | Stop reason: HOSPADM

## 2022-01-13 RX ORDER — HYDRALAZINE HYDROCHLORIDE 10 MG/1
10 TABLET, FILM COATED ORAL EVERY 8 HOURS SCHEDULED
Status: DISCONTINUED | OUTPATIENT
Start: 2022-01-13 | End: 2022-01-17 | Stop reason: HOSPADM

## 2022-01-13 RX ORDER — ATORVASTATIN CALCIUM 40 MG/1
40 TABLET, FILM COATED ORAL NIGHTLY
Status: DISCONTINUED | OUTPATIENT
Start: 2022-01-13 | End: 2022-01-17 | Stop reason: HOSPADM

## 2022-01-13 RX ORDER — PROCHLORPERAZINE EDISYLATE 5 MG/ML
10 INJECTION INTRAMUSCULAR; INTRAVENOUS EVERY 6 HOURS PRN
Status: DISCONTINUED | OUTPATIENT
Start: 2022-01-13 | End: 2022-01-17 | Stop reason: HOSPADM

## 2022-01-13 RX ORDER — FERROUS SULFATE 325(65) MG
325 TABLET ORAL
Status: DISCONTINUED | OUTPATIENT
Start: 2022-01-13 | End: 2022-01-14

## 2022-01-13 RX ORDER — OXYCODONE HYDROCHLORIDE AND ACETAMINOPHEN 5; 325 MG/1; MG/1
1 TABLET ORAL EVERY 4 HOURS PRN
Status: DISCONTINUED | OUTPATIENT
Start: 2022-01-13 | End: 2022-01-17 | Stop reason: HOSPADM

## 2022-01-13 RX ORDER — SODIUM CHLORIDE 0.9 % (FLUSH) 0.9 %
5-40 SYRINGE (ML) INJECTION EVERY 12 HOURS SCHEDULED
Status: DISCONTINUED | OUTPATIENT
Start: 2022-01-13 | End: 2022-01-17 | Stop reason: HOSPADM

## 2022-01-13 RX ORDER — HYDRALAZINE HYDROCHLORIDE 20 MG/ML
5 INJECTION INTRAMUSCULAR; INTRAVENOUS EVERY 4 HOURS PRN
Status: DISCONTINUED | OUTPATIENT
Start: 2022-01-13 | End: 2022-01-17 | Stop reason: HOSPADM

## 2022-01-13 RX ORDER — SPIRONOLACTONE 25 MG/1
25 TABLET ORAL DAILY
Status: DISCONTINUED | OUTPATIENT
Start: 2022-01-13 | End: 2022-01-17 | Stop reason: HOSPADM

## 2022-01-13 RX ORDER — METOLAZONE 2.5 MG/1
2.5 TABLET ORAL EVERY OTHER DAY
Status: DISCONTINUED | OUTPATIENT
Start: 2022-01-14 | End: 2022-01-15

## 2022-01-13 RX ORDER — ALLOPURINOL 300 MG/1
300 TABLET ORAL DAILY
Status: DISCONTINUED | OUTPATIENT
Start: 2022-01-13 | End: 2022-01-17 | Stop reason: HOSPADM

## 2022-01-13 RX ORDER — SODIUM CHLORIDE 0.9 % (FLUSH) 0.9 %
5-40 SYRINGE (ML) INJECTION PRN
Status: DISCONTINUED | OUTPATIENT
Start: 2022-01-13 | End: 2022-01-17 | Stop reason: HOSPADM

## 2022-01-13 RX ORDER — POLYETHYLENE GLYCOL 3350 17 G/17G
17 POWDER, FOR SOLUTION ORAL DAILY PRN
Status: DISCONTINUED | OUTPATIENT
Start: 2022-01-13 | End: 2022-01-17 | Stop reason: HOSPADM

## 2022-01-13 RX ORDER — ACETAMINOPHEN 325 MG/1
650 TABLET ORAL EVERY 6 HOURS PRN
Status: DISCONTINUED | OUTPATIENT
Start: 2022-01-13 | End: 2022-01-17 | Stop reason: HOSPADM

## 2022-01-13 RX ORDER — ISOSORBIDE MONONITRATE 30 MG/1
30 TABLET, EXTENDED RELEASE ORAL DAILY
Status: DISCONTINUED | OUTPATIENT
Start: 2022-01-14 | End: 2022-01-17 | Stop reason: HOSPADM

## 2022-01-13 RX ORDER — ACETAMINOPHEN 325 MG/1
650 TABLET ORAL EVERY 4 HOURS PRN
Status: DISCONTINUED | OUTPATIENT
Start: 2022-01-13 | End: 2022-01-13 | Stop reason: SDUPTHER

## 2022-01-13 RX ORDER — DEXTROSE MONOHYDRATE 25 G/50ML
12.5 INJECTION, SOLUTION INTRAVENOUS PRN
Status: DISCONTINUED | OUTPATIENT
Start: 2022-01-13 | End: 2022-01-17 | Stop reason: HOSPADM

## 2022-01-13 RX ORDER — COLCHICINE 0.6 MG/1
0.6 TABLET ORAL DAILY
Status: DISCONTINUED | OUTPATIENT
Start: 2022-01-13 | End: 2022-01-17 | Stop reason: HOSPADM

## 2022-01-13 RX ORDER — INSULIN GLARGINE 100 [IU]/ML
40 INJECTION, SOLUTION SUBCUTANEOUS 2 TIMES DAILY
Status: DISCONTINUED | OUTPATIENT
Start: 2022-01-13 | End: 2022-01-14

## 2022-01-13 RX ORDER — SENNA PLUS 8.6 MG/1
1 TABLET ORAL 2 TIMES DAILY
Status: DISCONTINUED | OUTPATIENT
Start: 2022-01-13 | End: 2022-01-17 | Stop reason: HOSPADM

## 2022-01-13 RX ORDER — PREGABALIN 25 MG/1
50 CAPSULE ORAL
Status: DISCONTINUED | OUTPATIENT
Start: 2022-01-13 | End: 2022-01-16

## 2022-01-13 RX ORDER — CARVEDILOL 3.12 MG/1
12.5 TABLET ORAL 2 TIMES DAILY WITH MEALS
Status: DISCONTINUED | OUTPATIENT
Start: 2022-01-13 | End: 2022-01-17 | Stop reason: HOSPADM

## 2022-01-13 RX ADMIN — SENNOSIDES 8.6 MG: 8.6 TABLET, COATED ORAL at 23:08

## 2022-01-13 RX ADMIN — INSULIN GLARGINE 40 UNITS: 100 INJECTION, SOLUTION SUBCUTANEOUS at 23:06

## 2022-01-13 RX ADMIN — ATORVASTATIN CALCIUM 40 MG: 40 TABLET, FILM COATED ORAL at 23:08

## 2022-01-13 RX ADMIN — CARVEDILOL 12.5 MG: 3.12 TABLET, FILM COATED ORAL at 23:08

## 2022-01-13 RX ADMIN — FUROSEMIDE 5 MG/HR: 10 INJECTION, SOLUTION INTRAMUSCULAR; INTRAVENOUS at 23:22

## 2022-01-13 RX ADMIN — INSULIN LISPRO 2 UNITS: 100 INJECTION, SOLUTION INTRAVENOUS; SUBCUTANEOUS at 23:06

## 2022-01-13 RX ADMIN — Medication 10 ML: at 23:09

## 2022-01-13 RX ADMIN — DOCUSATE SODIUM 100 MG: 100 CAPSULE ORAL at 23:08

## 2022-01-13 RX ADMIN — IOPAMIDOL 75 ML: 755 INJECTION, SOLUTION INTRAVENOUS at 14:43

## 2022-01-13 ASSESSMENT — PAIN SCALES - GENERAL
PAINLEVEL_OUTOF10: 3
PAINLEVEL_OUTOF10: 7
PAINLEVEL_OUTOF10: 0

## 2022-01-13 ASSESSMENT — PAIN DESCRIPTION - LOCATION
LOCATION: CHEST
LOCATION: GENERALIZED

## 2022-01-13 ASSESSMENT — PAIN DESCRIPTION - PAIN TYPE: TYPE: CHRONIC PAIN

## 2022-01-13 ASSESSMENT — PAIN DESCRIPTION - PROGRESSION: CLINICAL_PROGRESSION: GRADUALLY WORSENING

## 2022-01-13 NOTE — ED PROVIDER NOTES
Patient was seen and evaluated by myself in conjunction with nurse practitioner. History and physical exam were independently performed on [facility]. All diagnostic, treatment, and disposition decisions were made by myself in conjunction with nurse practitioner. Please see note completed by Bandar Finley NP for full details of patient's visit. Patient presents to the emergency department complaining of shortness of breath. Patient reportedly had O2 sats of approximately 70% upon arrival. Patient denies fevers, chills, or sweats. He notes chronic lower extremity swelling/edema. No obvious sick contacts. Patient is vaccinated for COVID. Physical exam: General: Respiratory distress. Obesity. Heart: Regular rate and rhythm. Normal S1-S2. Lungs: Clear to auscultation bilaterally. No wheezes, rales, rhonchi. Abdomen: Soft. Extremities: Mild edema. EKG: Normal sinus rhythm with a rate of 65. Left axis deviation. Lateral nonspecific ST changes.  This is replaced accelerated junctional rhythm from previous EKG on 8/26/2021.      1. Acute respiratory failure with hypoxia ([facility] [address]           Josefa Carlson DO  01/13/22 [id number]

## 2022-01-13 NOTE — H&P
Hospital Medicine History & Physical      PCP: Lucia Cranker, PATRICK - CNP    Date of Admission: 1/13/2022    Date of Service: Pt seen/examined on 01/13/22 and Admitted to Inpatient with expected LOS greater than two midnights due to medical therapy. Chief Complaint:  dyspnea    History Of Present Illness: The patient is a pleasant 78 Y M with a h/o former smoking, HTN, HLD, DM2, CAD s/p CABG in 2010, and CHF. He presents with about a week of worsening dyspnea. Significant orthopnea and paroxysmal nocturnal dyspnea. He feels edematous, particularly in his abdomen. He has been gaining an unknown amount of weight. He is compliant with his medications. Denies significant cough. No fevers or chills. CT in the ED confirmed pulmonary edema and he had prominent rales on exam.  No chest pain.         Past Medical History:          Diagnosis Date    Anemia     Angina     Arthritis     CAD (coronary artery disease)     [facility]     CKD (chronic kidney disease) stage 3, GFR 30-59 ml/min ([facility])     Clostridium difficile diarrhea 3/16/12; 2/29/12    positive stool toxin    Diabetes mellitus ([address]     Diabetic neuropathy ([address]     feet and legs    Disease of blood and blood forming organ     Dizziness     When he moves his head/ loses his balance    GERD (gastroesophageal reflux disease)     gastric ulcer    Headache     Hearing loss     Hyperlipidemia     Hypertension     Kidney stone 2002    Refusal of blood transfusions as patient is L[employer]-day     Sleep apnea     Tinnitus     Venous ulcer ([address]     LLE    Wound, open 1/13/2012       Past Surgical History:          Procedure Laterality Date    CARDIAC SURGERY      Dec 27 2010, triple bypass    CHOLECYSTECTOMY  9/2011    HERNIA REPAIR  age1    OTHER SURGICAL HISTORY  11-16-11 REPAIR LOWER STERNAL INCISION, REMOVAL OF ONE STERNAL WIRE,    OTHER SURGICAL HISTORY  10/10/2014 phacoemulsification of cataract with intraocular lens implant left eye    UPPER GASTROINTESTINAL ENDOSCOPY         Medications Prior to Admission:      Prior to Admission medications    Medication Sig Start Date End Date Taking? Authorizing Provider   oxyCODONE-acetaminophen (PERCOCET) 5-325 MG per tablet Take 1 tablet by mouth 4 times daily for 30 days. 12/21/21 1/20/22  Kandy Travis MD   naloxone 4 MG/0.1ML LIQD nasal spray 1 spray by Nasal route as needed for Opioid Reversal 11/23/21   Kandy Travis MD   carvedilol (COREG) 12.5 MG tablet Take 1 tablet by mouth 2 times daily (with meals) 8/31/21   PATRICK Garcia CNP   spironolactone (ALDACTONE) 25 MG tablet Take 1 tablet by mouth daily 9/1/21   PATRICK Garcia CNP   metOLazone (ZAROXOLYN) 2.5 MG tablet Take 1 tablet by mouth every other day 8/31/21   PATRICK Garcia CNP   torsemide [facility]) 100 MG tablet Take 0.5 tablets by mouth 2 times daily 8/31/21   PATRICK Garcia CNP   atorvastatin (LIPITOR) 40 MG tablet Take 1 tablet by mouth nightly 8/31/21   PATRICK Garcia CNP   linaCLOtide (LINZESS PO) Take by mouth    Historical Provider, MD   clotrimazole (LOTRIMIN) 1 % external solution Apply 4 drops to the left ear twice daily (morning and evening) for 10 days 10/5/20   Eboni Lee MD   acetaminophen (TYLENOL) 500 MG tablet Take 2 tablets by mouth 4 times daily as needed for Pain 9/17/20   PATRICK Conley CNP   hydrocortisone (ANUSOL-HC) 2.5 % rectal cream Place rectally 2 times daily.  8/7/19   PATRICK Cabrera CNP   senna (SENOKOT) 8.6 MG TABS tablet Take 1 tablet by mouth 2 times daily 7/8/19   PATRICK Medeiros CNP   colchicine (COLCRYS) 0.6 MG tablet Take 1 tablet by mouth daily 5/2/18   Alverna ArpitaPATRICK bloom CNP   docusate sodium (COLACE, DULCOLAX) 100 MG CAPS Take 100 mg by mouth 2 times daily 5/1/18   Oneil Palm APRN - CNP   insulin aspart (NOVOLOG) 100 UNIT/ML injection vial Inject into the skin 3 times daily (before meals) 50 units with breakfast, 20 units with lunch and 60 units with dinner    Historical Provider, MD   allopurinol (ZYLOPRIM) 100 MG tablet Take 300 mg by mouth     Historical Provider, MD   insulin glargine (LANTUS) 100 UNIT/ML injection vial Inject 55 Units into the skin 2 times daily  Patient taking differently: Inject 80 Units into the skin 2 times daily  1/15/17   Lorraine Waite MD   Compression Stockings MISC by Does not apply route 2 pair, knee high compression stockings, fitted/ zippered 6/15/16   Lewis Dominion, APRN - CNP   pregabalin (LYRICA) 50 MG capsule Take 1 capsule by mouth Daily with supper 2/9/16   Mathieu Luna MD   silver sulfADIAZINE (SILVADENE) 1 % cream Apply to BL lower leg ulcers daily 2/9/16   Mathieu Luna MD   nitroGLYCERIN (NITROSTAT) 0.4 MG SL tablet Place 1 tablet under the tongue every 5 minutes as needed 8/17/15   Lewis Dominion, APRN - CNP   fluticasone (FLONASE) 50 MCG/ACT nasal spray 1 spray by Nasal route nightly as needed. Historical Provider, MD   aspirin 81 MG chewable tablet Take 1 tablet by mouth daily. 1/11/13   Rome Spatz, MD   isosorbide mononitrate (IMDUR) 30 MG CR tablet Take 1 tablet by mouth daily. 1/11/13   Rome Spatz, MD   ferrous sulfate 325 (65 FE) MG tablet Take 325 mg by mouth 3 times daily (with meals). 12/22/10   Historical Provider, MD   omeprazole (PRILOSEC) 20 MG capsule Take 20 mg by mouth 2 times daily. 12/22/10   Historical Provider, MD       Allergies:  Amitriptyline, Celecoxib, Cymbalta [duloxetine hcl], Elavil [amitriptyline hcl], Gabapentin, and Nsaids    Social History:      The patient currently lives at home    TOBACCO:   reports that he quit smoking about 34 years ago. He has a 16.00 pack-year smoking history. He has never used smokeless tobacco.  ETOH:   reports no history of alcohol use.   E-Cigarettes/Vaping Use     Questions Responses    E-Cigarette/Vaping Use Never User    Start Date     Passive Exposure     Quit Date     Counseling Given     Comments             Family History:      Reviewed in detail and negative for ESRD. Positive as follows:        Problem Relation Age of Onset    Heart Disease Mother     Heart Disease Father     Cancer Father     Diabetes Brother     Diabetes Brother        REVIEW OF SYSTEMS COMPLETED:   Pertinent positives as noted in the HPI. All other systems reviewed and negative. PHYSICAL EXAM PERFORMED:    BP (!) 144/51   Pulse 74   Temp 98.1 °F (36.7 °C) (Oral)   Resp 23   Wt 295 lb (133.8 kg)   SpO2 92%   BMI 44.85 kg/m²       General appearance:  No apparent distress, appears stated age and cooperative. HEENT:  Normal cephalic, atraumatic without obvious deformity. Pupils equal, round, and reactive to light. Extra ocular muscles intact. Conjunctivae/corneas clear. 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 Neck: Supple, with full range of motion. No jugular venous distention. Trachea midline. Respiratory:  Normal respiratory effort. Bilaterally without Wheezes/Rhonchi. Prominent bibasilar rales. Cardiovascular:  Regular rate and rhythm with normal S1/S2 without rubs or gallops. 3/6 systolic murmur at the LSB. Abdomen: Soft, non-tender, non-distended with normal bowel sounds. Musculoskeletal:  No clubbing, cyanosis. BLE pitting edema. Full range of motion without deformity. Skin: Skin color, texture, turgor normal.  No rashes or lesions. Neurologic:  Neurovascularly intact without any focal sensory/motor deficits.  Cranial nerves: II-XII intact except as above, grossly non-focal.  Psychiatric:  Alert and oriented, thought content appropriate, excellent insight and historian  Capillary Refill: Brisk,3 seconds, normal  Peripheral Pulses: +2 palpable, equal bilaterally       Labs:     Recent Labs     01/13/22  1220   WBC 7.2   HGB 10.7*   HCT 33.1*        Recent Labs     01/13/22  1220   *   K 5.4* CL 94*   CO2 28   BUN 63*   CREATININE 1.4*   CALCIUM 9.3     Recent Labs     01/13/22  1220   AST 27   ALT 11   BILITOT 0.5   ALKPHOS 98     No results for input(s): INR in the last 72 hours. Recent Labs     01/13/22  1220   TROPONINI 0.04*       Urinalysis:      Lab Results   Component Value Date    NITRU Negative 07/08/2019    WBCUA 0-2 11/05/2013    BACTERIA Rare 11/05/2013    RBCUA 0-2 11/05/2013    BLOODU Negative 07/08/2019    SPECGRAV 1.010 07/08/2019    GLUCOSEU Negative 07/08/2019    GLUCOSEU NEGATIVE 02/29/2012       Radiology:     CXR: I have reviewed the CXR with the following interpretation: edema  EKG:  I have reviewed the EKG with the following interpretation: nonspecific abnormalities    CT CHEST PULMONARY EMBOLISM W CONTRAST   Preliminary Result   1. No CT evidence of a pulmonary embolism. 2. Findings are consistent with mild to moderate CHF, including small   bilateral pleural effusions, mild-to-moderate interstitial pulmonary edema,   and cardiomegaly. 3. Multifocal ground-glass consolidative opacities likely reflect a   combination of atelectasis, asymmetric edema, and mild multifocal pneumonia. 4. Mild mediastinal and bilateral hilar lymphadenopathy is most likely benign   and reactive in etiology given the patient's lung findings. 5. New scattered subcentimeter nodules throughout both lungs, the largest   measuring approximately 7 mm on average within the right upper lobe. These   are most likely benign and infectious or inflammatory in etiology. However,   further follow-up of these nodules is as advised below. 6. Stable moderate splenomegaly. RECOMMENDATIONS:   Fleischner Society guidelines for follow-up and management of incidentally   detected pulmonary nodules:      Multiple Solid Nodules:      Nodule size equals 6-8 mm   In a low-risk patient, CT at 3-6 months, then consider CT at 18-24 months. In a high-risk patient, CT at 3-6 months, then CT at 18-24 months. Radiology 2017 http://pubs. rsna.org/doi/full/10.1148/radiol. [id number]         XR CHEST PORTABLE   Final Result   Cardiomegaly with vascular congestion and airspace disease. While this could   all represent congestive failure, there is asymmetric airspace disease right   parahilar concerning for possible superimposed pneumonia or aspiration. In   addition, there is a somewhat nodular appearance to some of the findings. RECOMMENDATION:   Follow-up is recommended to include baseline films after resolution of   symptoms. If the nodular component persists, follow-up with CT recommended. ASSESSMENT:    Active Hospital Problems    Diagnosis Date Noted    Dyspnea [R06.00] 01/13/2022    [facility] [I50.31] 01/13/2022         PLAN:      Acute on chronic diastolic CHF  - he normally takes torsemide 50 BID (changed to furosemide gtt here), metolazone 2.5 QOD (continued here), and spironolactone 25 QD (continued here). - he isn't on ACEi/ARB due to unstable renal function. Started hydralazine. Continue ISMN. - continued carvedilol  - in 8/2021 he was admitted with CHF and diuresed from 311 to 297 lbs over the course of a week. - TTE 8/2021 showed normal EF and moderate AS. F/u repeat TTE.  - he has pulmonary HTN and isn't able to tolerate BiPAP for his HENNA. Acute on chronic hypoxic respiratory failure. Due to above, treat accordingly. Wean to baseline 3LNC. CKD3. Baseline Cr perhaps about 1.4, monitor. DM2. Adjusted insulin regimen while here. F/u A1c.    CAD. Aspirin, statin, carvedilol, ISMN. Incidental finding of small lung nodules. Due to his smoking history, radiology recommended that he have another Ct in 3-6 months. Defer to PCP. Of note, he is [employer] and does not want transfusions.         DVT Prophylaxis: enoxaparin  Diet: No diet orders on file  Code Status: Prior    PT/OT Eval Status: eval ordered    Dispo - when he is adequately diuresed. Perhaps 1/17 - 1/22. He lives at home and has [address]. Ana Moore MD    Thank you Treasa Gottron, APRN - CNP for the opportunity to be involved in this patient's care. If you have any questions or concerns please feel free to contact me at [phone].

## 2022-01-13 NOTE — ED PROVIDER NOTES
EMERGENCY DEPARTMENT ENCOUNTER      This patient was not seen and evaluated by the attending physician. Pt Name: Margret Lepe  MRN: [id number]  [place] 1955  Date of evaluation: 1/13/2022  Provider: PATRICK Krishnan  PCP: Treasa Gottron, APRN - CNP  ED Attending: Dr. Dafne Moreno    History provided by the patient. CHIEF COMPLAINT:     Chief Complaint   Patient presents with    Shortness of Breath     Pt arrived via EMS, pt c/o SOB pt SPO2 in 80s on 6lpm NC. EMS transported on NRB. Pt was c/o chest pain, negative 12 lead per EMS. HISTORY OF PRESENT ILLNESS:      Margret Lepe is a 78 y.o. male who presents [facility]  ED with complaints of shortness of breath. Patient complaining of shortness of breath has been going on for the last week or so patient was in the 80s on the pulse oximeter on 6 L nasal cannula, patient also has some chest pressure which she says started the same time and shortness of breath. Patient has a history of CHF, he is on multiple diuretics. He denies fevers denies cough. He is here for further evaluation    Location:chest  Quality:ache  Severity:3  Duration:a week  Modifying factors:none noted    Nursing Notes were reviewed     REVIEW OF SYSTEMS:     Review of Systems  All systems, atotal of 10, are reviewed and negative except for those that were just noted in history present illness.         PAST MEDICAL HISTORY:     Past Medical History:   Diagnosis Date    Anemia     Angina     Arthritis     CAD (coronary artery disease)     [facility]est[facility] failure) [facility]     CKD (chronic kidney disease) stage 3, GFR 30-59 ml/min ([facility])     Clostridium difficile diarrhea 3/16/12; 2/29/12    positive stool toxin    Diabetes mellitus [facility] [address]     Diabetic neuropathy ([facility])     feet and legs    Disease of blood and blood forming organ     Dizziness     When he moves his head/ loses his balance    GERD (gastroesophageal reflux disease)     gastric ulcer    Headache     Hearing loss     Hyperlipidemia     Hypertension     Kidney stone 2002    Refusal of blood transfusions as patient is Buddhism     Sleep apnea     Tinnitus     Venous ulcer ([facility] [address]     LLE    Wound, open 1/13/2012         SURGICAL HISTORY:      Past Surgical History:   Procedure Laterality Date    CARDIAC SURGERY      Dec 27 2010, triple bypass    CHOLECYSTECTOMY  9/2011    HERNIA REPAIR  age2    OTHER SURGICAL HISTORY  11-16-11 REPAIR LOWER STERNAL INCISION, REMOVAL OF ONE STERNAL WIRE,    OTHER SURGICAL HISTORY  10/10/2014    phacoemulsification of cataract with intraocular lens implant left eye    UPPER GASTROINTESTINAL ENDOSCOPY           CURRENT MEDICATIONS:       Previous Medications    ACETAMINOPHEN (TYLENOL) 500 MG TABLET    Take 2 tablets by mouth 4 times daily as needed for Pain    ALLOPURINOL (ZYLOPRIM) 100 MG TABLET    Take 300 mg by mouth     ASPIRIN 81 MG CHEWABLE TABLET    Take 1 tablet by mouth daily. ATORVASTATIN (LIPITOR) 40 MG TABLET    Take 1 tablet by mouth nightly    CARVEDILOL (COREG) 12.5 MG TABLET    Take 1 tablet by mouth 2 times daily (with meals)    CLOTRIMAZOLE (LOTRIMIN) 1 % EXTERNAL SOLUTION    Apply 4 drops to the left ear twice daily (morning and evening) for 10 days    COLCHICINE (COLCRYS) 0.6 MG TABLET    Take 1 tablet by mouth daily    COMPRESSION STOCKINGS MISC    by Does not apply route 2 pair, knee high compression stockings, fitted/ zippered    DOCUSATE SODIUM (COLACE, DULCOLAX) 100 MG CAPS    Take 100 mg by mouth 2 times daily    FERROUS SULFATE 325 (65 FE) MG TABLET    Take 325 mg by mouth 3 times daily (with meals). FLUTICASONE (FLONASE) 50 MCG/ACT NASAL SPRAY    1 spray by Nasal route nightly as needed. HYDROCORTISONE (ANUSOL-HC) 2.5 % RECTAL CREAM    Place rectally 2 times daily.     INSULIN ASPART (NOVOLOG) 100 UNIT/ML INJECTION VIAL    Inject into the skin 3 times daily (before meals) 50 units with breakfast, 20 units with lunch and 60 units with dinner    INSULIN GLARGINE (LANTUS) 100 UNIT/ML INJECTION VIAL    Inject 55 Units into the skin 2 times daily    ISOSORBIDE MONONITRATE (IMDUR) 30 MG CR TABLET    Take 1 tablet by mouth daily. LINACLOTIDE (LINZESS PO)    Take by mouth    METOLAZONE (ZAROXOLYN) 2.5 MG TABLET    Take 1 tablet by mouth every other day    NALOXONE 4 MG/0.1ML LIQD NASAL SPRAY    1 spray by Nasal route as needed for Opioid Reversal    NITROGLYCERIN (NITROSTAT) 0.4 MG SL TABLET    Place 1 tablet under the tongue every 5 minutes as needed    OMEPRAZOLE (PRILOSEC) 20 MG CAPSULE    Take 20 mg by mouth 2 times daily. OXYCODONE-ACETAMINOPHEN (PERCOCET) 5-325 MG PER TABLET    Take 1 tablet by mouth 4 times daily for 30 days.     PREGABALIN (LYRICA) 50 MG CAPSULE    Take 1 capsule by mouth Daily with supper    SENNA (SENOKOT) 8.6 MG TABS TABLET    Take 1 tablet by mouth 2 times daily    SILVER SULFADIAZINE (SILVADENE) 1 % CREAM    Apply to BL lower leg ulcers daily    SPIRONOLACTONE (ALDACTONE) 25 MG TABLET    Take 1 tablet by mouth daily    TORSEMIDE (DEMADEX) 100 MG TABLET    Take 0.5 tablets by mouth 2 times daily         ALLERGIES:    Amitriptyline, Celecoxib, Cymbalta [duloxetine hcl], Elavil [amitriptyline hcl], Gabapentin, and Nsaids    FAMILY HISTORY:       Family History   Problem Relation Age of Onset    Heart Disease Mother     Heart Disease Father     Cancer Father     Diabetes Brother     Diabetes Brother           SOCIAL HISTORY:       Social History     Socioeconomic History    Marital status: Single     Spouse name: None    Number of children: None    Years of education: None    Highest education level: None   Occupational History    None   Tobacco Use    Smoking status: Former Smoker     Packs/day: 1.00     Years: 16.00     Pack years: 16.00     Quit date: 1/10/1988     Years since quittin.0    Smokeless tobacco: Never Used    Tobacco comment: 22 yrs ago Vaping Use    Vaping Use: Never used   Substance and Sexual Activity    Alcohol use: No     Alcohol/week: 0.0 standard drinks    Drug use: No    Sexual activity: None   Other Topics Concern    None   Social History Narrative    None     Social Determinants of Health     Financial Resource Strain:     Difficulty of Paying Living Expenses: Not on file   Food Insecurity:     Worried About Running Out of Food in the Last Year: Not on file    James of Food in the Last Year: Not on file   Transportation Needs:     Lack of Transportation (Medical): Not on file    Lack of Transportation (Non-Medical): Not on file   Physical Activity:     Days of Exercise per Week: Not on file    Minutes of Exercise per Session: Not on file   Stress:     Feeling of Stress : Not on file   Social Connections:     Frequency of Communication with Friends and Family: Not on file    Frequency of Social Gatherings with Friends and Family: Not on file    Attends [employer] Services: Not on file    Active Member of [address] [place] [address] or Organizations: Not on file    Attends Club or Organization Meetings: Not on file    Marital Status: Not on file   Intimate Partner Violence:     Fear of Current or Ex-Partner: Not on file    Emotionally Abused: Not on file    Physically Abused: Not on file    Sexually Abused: Not on file   Housing Stability:     Unable to Pay for Housing in the Last Year: Not on file    Number of [place] in the Last Year: Not on file    Unstable Housing in the Last Year: Not on file       SCREENINGS:            PHYSICAL EXAM:       ED Triage Vitals [01/13/22 1208]   BP Temp Temp Source Pulse Resp SpO2 Height Weight   130/60 98.1 °F (36.7 °C) Oral 66 (!) 32 (!) 70 % -- 295 lb (133.8 kg)       Physical Exam    CONSTITUTIONAL: Awake and alert. Cooperative. Well-developed. Well-nourished.    Vitals:    01/13/22 1208 01/13/22 1211   BP: 130/60    Pulse: 66    Resp: (!) 32    Temp: 98.1 °F (36.7 °C)    TempSrc: Oral SpO2: (!) 70% 95%   Weight: 295 lb (133.8 kg)      HENT: Normocephalic. Atraumatic. External ears normal, without discharge. TMs clear bilaterally. No nasal discharge. Oropharynx clear, no erythema. Mucous membranes moist.  EYES: Conjunctiva non-injected, no lid abnormalities noted. No scleral icterus. PERRL. EOM's grossly intact. Anterior chambers clear. NECK: Supple. Normal ROM. No meningismus. No thyroid tenderness or swelling noted. CARDIOVASCULAR: RRR. No Murmer. PULMONARY/CHEST WALL: Slight increase in respiratory effort, tachypneic. Lungs clear to ausculation. ABDOMEN: Normal BS. Soft. Nondistended. No tenderness to palpate. No guarding. No hernias noted. No splenomegaly. Back: Spine is midline. No ecchymosis. No crepitus on palpation. No obvious subluxation of vertebral column. No saddle anesthesia or evidence of cauda equina. /ANORECTAL: Not assessed  MUSKULOSKELETAL: Normal ROM. No acute deformities. There is some pedal edema bilaterally but difficult to assess given the patient body habitus. no tenderness to palpate. SKIN: Warm and dry. NEUROLOGICAL:  GCS 15. CN II-XII grossly intact. Strength is 5/5 in allextremities and sensation is intact. PSYCHIATRIC: Normal affect, normal insight and judgement. Alert andoriented x 3.         DIAGNOSTIC RESULTS:     LABS:    Results for orders placed or performed during the hospital encounter of 01/13/22   EKG 12 Lead   Result Value Ref Range    Ventricular Rate 65 BPM    Atrial Rate 65 BPM    P-R Interval 196 ms    QRS Duration 82 ms    Q-T Interval 426 ms    QTc Calculation (Bazett) 443 ms    P Axis 119 degrees    R Axis -35 degrees    T Axis 131 degrees    Diagnosis       Normal sinus rhythmLeft axis deviationPulmonary disease patternST & T wave abnormality, consider lateral ischemiaAbnormal ECGWhen compared with ECG of 26-AUG-2021 17:35,Sinus rhythm has replaced Junctional rhythm         RADIOLOGY:  All x-ray studies are viewed/reviewedby me.  Formal interpretations per the radiologist are as follows:      CT CHEST PULMONARY EMBOLISM W CONTRAST   Preliminary Result   1. No CT evidence of a pulmonary embolism. 2. Findings are consistent with mild to moderate CHF, including small   bilateral pleural effusions, mild-to-moderate interstitial pulmonary edema,   and cardiomegaly. 3. Multifocal ground-glass consolidative opacities likely reflect a   combination of atelectasis, asymmetric edema, and mild multifocal pneumonia. 4. Mild mediastinal and bilateral hilar lymphadenopathy is most likely benign   and reactive in etiology given the patient's lung findings. 5. New scattered subcentimeter nodules throughout both lungs, the largest   measuring approximately 7 mm on average within the right upper lobe. These   are most likely benign and infectious or inflammatory in etiology. However,   further follow-up of these nodules is as advised below. 6. Stable moderate splenomegaly. RECOMMENDATIONS:   Fleischner Society guidelines for follow-up and management of incidentally   detected pulmonary nodules:      Multiple Solid Nodules:      Nodule size equals 6-8 mm   In a low-risk patient, CT at 3-6 months, then consider CT at 18-24 months. In a high-risk patient, CT at 3-6 months, then CT at 18-24 months. Radiology 2017 http://pubs. rsna.org/doi/full/10.1148/radiol. 0758721424         XR CHEST PORTABLE   Final Result   Cardiomegaly with vascular congestion and airspace disease. While this could   all represent congestive failure, there is asymmetric airspace disease right   parahilar concerning for possible superimposed pneumonia or aspiration. In   addition, there is a somewhat nodular appearance to some of the findings. RECOMMENDATION:   Follow-up is recommended to include baseline films after resolution of   symptoms. If the nodular component persists, follow-up with CT recommended.                  EKG:  See EKG interpretation by an attending phsyician      PROCEDURES:   N/A    CRITICAL CARE TIME:   Total critical care time today provided was at least 38 minutes. This excludes seperately billable procedure. Critical care time provided for acute respiratory failure that required close evaluation and/or intervention with concern for patient decompensation. CONSULTS:  None      EMERGENCYDEPARTMENT COURSE and DIFFERENTIAL DIAGNOSIS/MDM:   Vitals:    Vitals:    01/13/22 1510 01/13/22 1536 01/13/22 1538 01/13/22 1740   BP: (!) 144/51   125/88   Pulse: 64 67 74    Resp: 22 19 23    Temp:       TempSrc:       SpO2: 93% 91% 92% 91%   Weight:           Patient was given the following medications:  Medications - No data to display      Patient was evaluated by both myself and Dr. Darryl Gutiérrez  Patient presented to the emergency room today with complaints of shortness of breath. Patient has been hypoxic here even on a 6 L of oxygen he is on 3 L at baseline. Patient was concerned about CHF as was I however BNP is not significantly elevated with only 1600, CT of his chest showed no PE but did show some multifocal groundglass opacities concern for possible multifocal pneumonia or could be edema, he is in chronic renal disease with a creatinine of 1.4, chronic troponin leak 0.04. His flu was negative COVID negative, he had no leukocytosis and was afebrile. I do feel the patient will need to be admitted to the hospital for acute respiratory failure, unsure of the etiology at this time I discussed the case with the hospitalist who feel that it is likely CHF but he will manage from this point on. Patient agreeable with admission. Patient laboratory studies, radiographic imaging, andassessment were all discussed with the patient and/or patient family. There was shared decision-making between myself, the attending physician, as well as the patient and/or their surrogate and we are all in agreement with admission.   There was an opportunity for questions and all questions were answered to the best of my ability and to the satisfaction of the patient and/or patient family. FINAL IMPRESSION:      1. Acute respiratory failure with hypoxia (HCC)          DISPOSITION/PLAN:   DISPOSITION      PATIENT REFERRED TO:  No follow-up provider specified.     DISCHARGE MEDICATIONS:  New Prescriptions    No medications on file                  (Please note that portions of this note were completed with a voice recognition program.  Efforts were made to edit the dictations, but occasionally words are mis-transcribed.)    PATRICK Marley CNP-C (electronically signed)        PATRICK Marley CNP  01/13/22 3757

## 2022-01-14 LAB
ANION GAP SERPL CALCULATED.3IONS-SCNC: 9 MMOL/L (ref 3–16)
BUN BLDV-MCNC: 53 MG/DL (ref 7–20)
CALCIUM SERPL-MCNC: 9.4 MG/DL (ref 8.3–10.6)
CHLORIDE BLD-SCNC: 99 MMOL/L (ref 99–110)
CO2: 33 MMOL/L (ref 21–32)
CREAT SERPL-MCNC: 1.3 MG/DL (ref 0.8–1.3)
EKG ATRIAL RATE: 65 BPM
EKG DIAGNOSIS: NORMAL
EKG P AXIS: 119 DEGREES
EKG P-R INTERVAL: 196 MS
EKG Q-T INTERVAL: 426 MS
EKG QRS DURATION: 82 MS
EKG QTC CALCULATION (BAZETT): 443 MS
EKG R AXIS: -35 DEGREES
EKG T AXIS: 131 DEGREES
EKG VENTRICULAR RATE: 65 BPM
ESTIMATED AVERAGE GLUCOSE: 171.4 MG/DL
GFR AFRICAN AMERICAN: >60
GFR NON-AFRICAN AMERICAN: 55
GLUCOSE BLD-MCNC: 254 MG/DL (ref 70–99)
GLUCOSE BLD-MCNC: 263 MG/DL (ref 70–99)
GLUCOSE BLD-MCNC: 267 MG/DL (ref 70–99)
GLUCOSE BLD-MCNC: 280 MG/DL (ref 70–99)
GLUCOSE BLD-MCNC: 324 MG/DL (ref 70–99)
HBA1C MFR BLD: 7.6 %
HCT VFR BLD CALC: 32.1 % (ref 40.5–52.5)
HEMOGLOBIN: 10.5 G/DL (ref 13.5–17.5)
IRON SATURATION: 13 % (ref 20–50)
IRON: 33 UG/DL (ref 59–158)
MAGNESIUM: 2.2 MG/DL (ref 1.8–2.4)
MCH RBC QN AUTO: 28.9 PG (ref 26–34)
MCHC RBC AUTO-ENTMCNC: 32.6 G/DL (ref 31–36)
MCV RBC AUTO: 88.6 FL (ref 80–100)
PDW BLD-RTO: 17.1 % (ref 12.4–15.4)
PERFORMED ON: ABNORMAL
PLATELET # BLD: 189 K/UL (ref 135–450)
PMV BLD AUTO: 8.4 FL (ref 5–10.5)
POTASSIUM SERPL-SCNC: 4.6 MMOL/L (ref 3.5–5.1)
RBC # BLD: 3.63 M/UL (ref 4.2–5.9)
SODIUM BLD-SCNC: 141 MMOL/L (ref 136–145)
TOTAL IRON BINDING CAPACITY: 255 UG/DL (ref 260–445)
WBC # BLD: 7.6 K/UL (ref 4–11)

## 2022-01-14 PROCEDURE — 1200000000 HC SEMI PRIVATE

## 2022-01-14 PROCEDURE — 2700000000 HC OXYGEN THERAPY PER DAY

## 2022-01-14 PROCEDURE — 97530 THERAPEUTIC ACTIVITIES: CPT

## 2022-01-14 PROCEDURE — 93010 ELECTROCARDIOGRAM REPORT: CPT | Performed by: INTERNAL MEDICINE

## 2022-01-14 PROCEDURE — 85027 COMPLETE CBC AUTOMATED: CPT

## 2022-01-14 PROCEDURE — 6370000000 HC RX 637 (ALT 250 FOR IP): Performed by: INTERNAL MEDICINE

## 2022-01-14 PROCEDURE — 6360000002 HC RX W HCPCS: Performed by: INTERNAL MEDICINE

## 2022-01-14 PROCEDURE — 2580000003 HC RX 258: Performed by: INTERNAL MEDICINE

## 2022-01-14 PROCEDURE — C8923 2D TTE W OR W/O FOL W/CON,CO: HCPCS

## 2022-01-14 PROCEDURE — 83540 ASSAY OF IRON: CPT

## 2022-01-14 PROCEDURE — 2500000003 HC RX 250 WO HCPCS: Performed by: INTERNAL MEDICINE

## 2022-01-14 PROCEDURE — 97162 PT EVAL MOD COMPLEX 30 MIN: CPT

## 2022-01-14 PROCEDURE — 80048 BASIC METABOLIC PNL TOTAL CA: CPT

## 2022-01-14 PROCEDURE — 97166 OT EVAL MOD COMPLEX 45 MIN: CPT

## 2022-01-14 PROCEDURE — 83735 ASSAY OF MAGNESIUM: CPT

## 2022-01-14 PROCEDURE — 83550 IRON BINDING TEST: CPT

## 2022-01-14 PROCEDURE — 94761 N-INVAS EAR/PLS OXIMETRY MLT: CPT

## 2022-01-14 PROCEDURE — 36415 COLL VENOUS BLD VENIPUNCTURE: CPT

## 2022-01-14 RX ORDER — INSULIN GLARGINE 100 [IU]/ML
55 INJECTION, SOLUTION SUBCUTANEOUS 2 TIMES DAILY
Status: DISCONTINUED | OUTPATIENT
Start: 2022-01-14 | End: 2022-01-15

## 2022-01-14 RX ADMIN — INSULIN LISPRO 10 UNITS: 100 INJECTION, SOLUTION INTRAVENOUS; SUBCUTANEOUS at 12:35

## 2022-01-14 RX ADMIN — INSULIN LISPRO 5 UNITS: 100 INJECTION, SOLUTION INTRAVENOUS; SUBCUTANEOUS at 22:39

## 2022-01-14 RX ADMIN — PANTOPRAZOLE SODIUM 40 MG: 40 TABLET, DELAYED RELEASE ORAL at 06:15

## 2022-01-14 RX ADMIN — OXYCODONE HYDROCHLORIDE AND ACETAMINOPHEN 1 TABLET: 5; 325 TABLET ORAL at 00:36

## 2022-01-14 RX ADMIN — ENOXAPARIN SODIUM 40 MG: 40 INJECTION SUBCUTANEOUS at 12:31

## 2022-01-14 RX ADMIN — INSULIN LISPRO 12 UNITS: 100 INJECTION, SOLUTION INTRAVENOUS; SUBCUTANEOUS at 12:34

## 2022-01-14 RX ADMIN — DOCUSATE SODIUM 100 MG: 100 CAPSULE ORAL at 12:30

## 2022-01-14 RX ADMIN — METOLAZONE 2.5 MG: 2.5 TABLET ORAL at 12:30

## 2022-01-14 RX ADMIN — ISOSORBIDE MONONITRATE 30 MG: 30 TABLET, EXTENDED RELEASE ORAL at 12:29

## 2022-01-14 RX ADMIN — PREGABALIN 50 MG: 25 CAPSULE ORAL at 00:36

## 2022-01-14 RX ADMIN — INSULIN GLARGINE 40 UNITS: 100 INJECTION, SOLUTION SUBCUTANEOUS at 12:34

## 2022-01-14 RX ADMIN — CARVEDILOL 12.5 MG: 3.12 TABLET, FILM COATED ORAL at 12:33

## 2022-01-14 RX ADMIN — SENNOSIDES 8.6 MG: 8.6 TABLET, COATED ORAL at 12:30

## 2022-01-14 RX ADMIN — HYDRALAZINE HYDROCHLORIDE 10 MG: 10 TABLET, FILM COATED ORAL at 06:15

## 2022-01-14 RX ADMIN — ASPIRIN 81 MG 81 MG: 81 TABLET ORAL at 12:30

## 2022-01-14 RX ADMIN — ATORVASTATIN CALCIUM 40 MG: 40 TABLET, FILM COATED ORAL at 22:38

## 2022-01-14 RX ADMIN — HYDRALAZINE HYDROCHLORIDE 10 MG: 10 TABLET, FILM COATED ORAL at 22:38

## 2022-01-14 RX ADMIN — ALLOPURINOL 300 MG: 300 TABLET ORAL at 12:30

## 2022-01-14 RX ADMIN — SPIRONOLACTONE 25 MG: 25 TABLET, FILM COATED ORAL at 12:30

## 2022-01-14 RX ADMIN — Medication 10 ML: at 22:39

## 2022-01-14 RX ADMIN — FUROSEMIDE 10 MG/HR: 10 INJECTION, SOLUTION INTRAMUSCULAR; INTRAVENOUS at 22:57

## 2022-01-14 RX ADMIN — INSULIN GLARGINE 55 UNITS: 100 INJECTION, SOLUTION SUBCUTANEOUS at 22:39

## 2022-01-14 RX ADMIN — SENNOSIDES 8.6 MG: 8.6 TABLET, COATED ORAL at 22:38

## 2022-01-14 RX ADMIN — IRON SUCROSE 100 MG: 20 INJECTION, SOLUTION INTRAVENOUS at 23:00

## 2022-01-14 RX ADMIN — Medication 1 EACH: at 18:16

## 2022-01-14 RX ADMIN — CARVEDILOL 12.5 MG: 3.12 TABLET, FILM COATED ORAL at 18:51

## 2022-01-14 RX ADMIN — DOCUSATE SODIUM 100 MG: 100 CAPSULE ORAL at 22:38

## 2022-01-14 ASSESSMENT — PAIN SCALES - GENERAL
PAINLEVEL_OUTOF10: 8
PAINLEVEL_OUTOF10: 0

## 2022-01-14 ASSESSMENT — PAIN DESCRIPTION - PROGRESSION: CLINICAL_PROGRESSION: GRADUALLY WORSENING

## 2022-01-14 NOTE — PROGRESS NOTES
30-59 ml/min ([facility] [address] Clostridium difficile diarrhea, Diabetes mellitus ([facility] [address] Diabetic neuropathy ([facility] [address] Disease of blood and blood forming organ, Dizziness, GERD (gastroesophageal reflux disease), Headache, Hearing loss, Hyperlipidemia, Hypertension, Kidney stone, Refusal of blood transfusions as patient is Hindu, Sleep apnea, Tinnitus, Venous ulcer ([facility] [address] and Wound, open. has a past surgical history that includes hernia repair (age1); Cholecystectomy (9/2011); other surgical history (11-16-11 REPAIR LOWER STERNAL INCISION, REMOVAL OF ONE STERNAL WIRE,); Upper gastrointestinal endoscopy; Cardiac surgery; and other surgical history (10/10/2014).     Restrictions  Restrictions/Precautions  Restrictions/Precautions: General Precautions,Fall Risk,Up as Tolerated  Position Activity Restriction  Other position/activity restrictions: 5  L O 2, telemtry, IV, purewick  Vision/Hearing  Vision: Impaired  Vision Exceptions:  (R eye blind)  Hearing: Within functional limits     Subjective  General  Chart Reviewed: Yes  Patient assessed for rehabilitation services?: Yes  Response To Previous Treatment: Not applicable  Family / Caregiver Present: No  Referring Practitioner: [place]  Referral Date : 01/13/22  Subjective  Subjective: pt in bed, fatigued but agreeable to transfer to chair  Pain Screening  Patient Currently in Pain: Denies  Vital Signs  Patient Currently in Pain: Denies       Orientation  Orientation  Overall Orientation Status: Within Functional Limits  Social/Functional History  Social/Functional History  Lives With: Alone  Type of Home: Apartment  Home Layout: One level  Home Access: Stairs to enter without rails  Entrance Stairs - Number of Steps: 2  Bathroom Shower/Tub: Tub/Shower unit  Bathroom Equipment: Grab bars in shower  Home Equipment: Cane,4 wheeled walker  Receives Help From: Friend(s),Other (comment) (friend assists with grocery shopping, [address] for laundry, cleaning;)  ADL Assistance: Independent (sponge bathes)  Homemaking Assistance: Needs assistance [facility] cleaning services, they complete laundry, assist for grocery shopping, makes microwave meals)  Homemaking Responsibilities: Yes  Meal Prep Responsibility: Primary  Bill Paying/Finance Responsibility: Primary  Ambulation Assistance: Independent ([address] in community, no AD in home)  Transfer Assistance: Independent  Active : No  Occupation: On disability,Retired  Type of occupation: [employer]  Additional Comments: 3 L O2 baseline, denies recent falls; Nurse visits for LE wound care/dressing changes    Objective     Observation/Palpation  Posture: Fair    AROM RLE (degrees)  RLE AROM: WFL  AROM LLE (degrees)  LLE AROM : WFL  Strength RLE  Strength RLE: WFL  Strength LLE  Strength LLE: WFL     Sensation  Overall Sensation Status: Impaired (neuropathy)  Bed mobility  Supine to Sit: Contact guard assistance  Sit to Supine: Unable to assess  Transfers  Sit to Stand: Contact guard assistance  Stand to sit: Contact guard assistance  Bed to Chair: Contact guard assistance (with SPC)  Ambulation  Ambulation?: No  Stairs/Curb  Stairs?: No        Exercises  Hip Flexion: 1x 15 BLE seated marches  Knee Long Arc Quad: 1x 15 BLE  Ankle Pumps: 1x 15 BLE     Plan   Plan  Times per week: 3-5  Plan weeks: 1/21/22  Specific instructions for Next Treatment: progress mobility  Current Treatment Recommendations: Strengthening,Transfer Training,Endurance Training,Neuromuscular Re-education,Patient/Caregiver Education & Training,Balance Training,Gait Training,Home Exercise Program,Functional Mobility Training,Safety Education & Training  Safety Devices  Type of devices:  All fall risk precautions in place,Patient at risk for falls,Call light within reach,Left in chair,Chair alarm in place,Gait belt,Nurse notified  Restraints  Initially in place: No    AM-PAC Score     AM-PAC Inpatient Mobility without Stair Climbing Raw Score : 16 (01/14/22 1340)  AM-PAC Inpatient without Stair Climbing T-Scale Score : 45.54 (01/14/22 1340)  Mobility Inpatient CMS 0-100% Score: 40.64 (01/14/22 1340)  Mobility Inpatient without Stair CMS G-Code Modifier : CK (01/14/22 1340)       Goals  Short term goals  Time Frame for Short term goals: 1/21/22  Short term goal 1: Pt will complete transfers with LRAD mod I. Short term goal 2: Pt will ambulate 50 ft with LRAD mod I. Short term goal 3: Pt will complete 12-15 reps of  LE Exercises by 1/17; goal met 1/14  Short term goal 4: Pt will complete bed mobility independently. Patient Goals   Patient goals : \"Go home\"       Therapy Time   Individual Concurrent Group Co-treatment   Time In 0827         Time Out 0855         Minutes 28         Timed Code Treatment Minutes: 18 Minutes (10 min eval)     If pt is unable to be seen after this session, please let this note serve as discharge summary. Please see case management note for discharge disposition. Thank you.     Susanna Pérez, PT

## 2022-01-14 NOTE — PROGRESS NOTES
Occupational Therapy   Occupational Therapy Initial Assessment  Date: 2022   Patient Name: Prince Mims  MRN: [id number]     : 1955    Date of Service: 2022    Discharge Recommendations:  Home with Home health OT       Assessment   Performance deficits / Impairments: Decreased functional mobility ; Decreased ADL status; Decreased endurance;Decreased high-level IADLs  Assessment: pt reports normally independent with functional mobility in apt without AD & performs BADL's independently on 3 L O 2; pt now on 5 L O 2, requiring CGA with stand pivot transfers/max assist with UE dressing, set up to feed self; pt to benefit from skilled OT services  OT Education: OT Role;Plan of Care;Precautions  Patient Education: disease specific: importance of OOB to chair for meals/ADL's for short periods of time with Assist from staff, use of RED/nurse call light for assist  Barriers to Learning: decreased vision  REQUIRES OT FOLLOW UP: Yes  Activity Tolerance  Activity Tolerance: Patient Tolerated treatment well  Activity Tolerance: sitting in chair on 5 L O 2:  BP = 115/73, HR = 64, 91-93 % O 2 sats; Safety Devices  Safety Devices in place: Yes  Type of devices: Call light within reach; Chair alarm in place; Left in chair;Nurse notified           Patient Diagnosis(es): The encounter diagnosis was Acute respiratory failure with hypoxia ([address] has a past medical history of Anemia, Angina, Arthritis, CAD (coronary artery disease), CHF (congestive heart failure) ([address] CKD (chronic kidney disease) stage 3, GFR 30-59 ml/min ([facility]), Clostridium difficile diarrhea, Diabetes mellitus ([address] Diabetic neuropathy ([address] Disease of blood and blood forming organ, Dizziness, GERD (gastroesophageal reflux disease), Headache, Hearing loss, Hyperlipidemia, Hypertension, Kidney stone, Refusal of blood transfusions as patient is Hoahaoism, Sleep apnea, Tinnitus, Venous ulcer ([address] and Wound, open.    has a past surgical history that includes hernia repair (age2); Cholecystectomy (9/2011); other surgical history (11-16-11 REPAIR LOWER STERNAL INCISION, REMOVAL OF ONE STERNAL WIRE,); Upper gastrointestinal endoscopy; Cardiac surgery; and other surgical history (10/10/2014).            Restrictions  Restrictions/Precautions  Restrictions/Precautions: General Precautions,Fall Risk,Up as Tolerated  Position Activity Restriction  Other position/activity restrictions: 5  L O 2, telemtry, IV, purewick    Subjective   General  Chart Reviewed: Yes  Patient assessed for rehabilitation services?: Yes  Family / Caregiver Present: No  Referring Practitioner: Dr. Farhad Manuel  Diagnosis: Acute respiratory failure, CHF excerbation  General Comment  Comments: RN cleared pt for OT eval; pt awake in bed, agreeable to get up to chair for breakfast    Pain: Denies    Social/Functional History  Social/Functional History  Lives With: Alone  Type of Home: Apartment  Home Layout: One level  Home Access: Stairs to enter without rails  Entrance Stairs - Number of Steps: 2  Bathroom Shower/Tub: Tub/Shower unit  Bathroom Equipment: Grab bars in shower  Home Equipment: Cane,4 wheeled walker  Receives Help From: Friend(s),Other (comment) (friend assists with grocery shopping, [address] for laundry, cleaning;)  ADL Assistance: Independent (sponge bathes)  Homemaking Assistance: Needs assistance [facility] [employer], they complete laundry, assist for grocery shopping, makes microwave meals)  Homemaking Responsibilities: Yes  Meal Prep Responsibility: Primary  Bill Paying/Finance Responsibility: Primary  Ambulation Assistance: Independent ([facility] in [facility], no AD in home)  Transfer Assistance: Independent  Active : No  Occupation: On disability,Retired  Type of occupation: marce [employer]  Additional Comments: 3 L O2 baseline, denies recent falls; Nurse visits for LE wound care/dressing changes       Objective        Orientation  Overall Orientation Status: Within Functional Limits     Balance  Sitting Balance: Supervision  Standing Balance: Contact guard assistance (with gait belt & cane)  Standing Balance  Activity: bed-->chair  ADL  Feeding: Setup (to open small packages on tray)  Grooming: Setup (seated in chair to wash face &  hands)  LE Dressing: Maximum assistance  Toileting: Dependent/Total (Purewick)    RUE Tone: Normotonic  LUE Tone: Normotonic  Coordination  Movements Are Fluid And Coordinated: Yes     Bed mobility  Supine to Sit: Contact guard assistance  Sit to Supine: Unable to assess (Left up in chair for breakfast, pt agreeable)  Scooting: Modified independent  Transfers  Stand Pivot Transfers: Contact guard assistance  Sit to stand: Contact guard assistance  Stand to sit: Contact guard assistance     Cognition  Overall Cognitive Status: WFL        Sensation  Overall Sensation Status: Impaired (neuropathy)    Type of ROM/Therapeutic Exercise: AROM  Comment: BUE seated in chair   Hand flex/ext: x 10   Reps  Elbow flex/ext:  X 10    Reps  Shld flex/ext:  x  10  Reps    LUE AROM : WFL  RUE AROM : WFL     Plan   Plan  Times per week: 3-5x/ week  Current Treatment Recommendations: Balance Training,Functional Mobility Training,Safety Education & Training,Positioning,Self-Care / ADL,Endurance Training             AM-PAC Score        AM-[facility] Inpatient Daily Activity Raw Score: 13 (01/14/22 0947)  AM-PAC Inpatient ADL T-Scale Score : 32.03 (01/14/22 0947)  ADL Inpatient CMS 0-100% Score: 63.03 (01/14/22 0947)  ADL Inpatient CMS G-Code Modifier : CL (01/14/22 7066)    Goals  Short term goals  Time Frame for Short term goals: 1 week(1-21-22)  Short term goal 1: supervision with bathroom mobility by 1-21-22  Short term goal 2: supervision standing ADL's for 5 minutes by 1-20-22  Short term goal 3: supervision with functional/toilet transfers  Short term goal 4: set up only for UE self care  Patient Goals   Patient goals : go home when able Therapy Time   Individual Concurrent Group Co-treatment   Time In 0825         Time Out 0856         Minutes 111 [address] [place] [address]

## 2022-01-14 NOTE — PROGRESS NOTES
Patient admitted to room 208 from ED. Patient oriented to room, call light, bed rails, phone, lights and bathroom. Patient instructed about the schedule of the day including: vital sign frequency, lab draws, possible tests, frequency of MD and staff rounds, including RN/MD rounding together at bedside, daily weights, and I &O's. Patient instructed about prescribed diet, how to use 8MENU, and television. bed alarm in place, patient aware of placement and reason. Telemetry box  in place, patient aware of placement and reason. Bed locked, in lowest position, side rails up 2/4, call light within reach. Will continue to monitor.         Vitals:    01/13/22 1915   BP: (!) 157/71   Pulse: 74   Resp: 16   Temp: 97.8 °F (36.6 °C)   SpO2:

## 2022-01-14 NOTE — CONSULTS
[address] [id number] Continence Nurse  Consult Note       NAME:  Tung Partida [facility]  MEDICAL RECORD NUMBER:  [id number]  AGE: 78 y.o. GENDER: male  : 1955  TODAY'S DATE:  2022    Subjective  I have a home care RN that comes to my house twice a week and changes my leg dressings. Reason for WOCN Evaluation and Assessment: Bilateral lower extremities      Shay Be is a 78 y.o. male referred by:   [x] Physician  [x] Nursing  [] Other:     Wound Identification:  Wound Type: Chronic venous ulcers bilateral lower legs. Contributing Factors: edema, diabetes, chronic pressure, decreased mobility, shear force, obesity and incontinence of urine    Wound History: The patient is a pleasant 78 Y M with a h/o former smoking, HTN, HLD, DM2, CAD s/p CABG in , and CHF. He presents with about a week of worsening dyspnea. Significant orthopnea and paroxysmal nocturnal dyspnea. He feels edematous, particularly in his abdomen. He has been gaining an unknown amount of weight. He is compliant with his medications. Known to wound care and last seen in 2021. Current Wound Care Treatment:  Unna boots bilateral lwer legs    Patient Goal of Care:  [x] Wound Healing  [] Odor Control  [] Palliative Care  [] Pain Control   [x] Other:prevention of swelling in lower legs and treatment of venous ulcers.         PAST MEDICAL HISTORY        Diagnosis Date    Anemia     Angina     Arthritis     CAD (coronary artery disease)     CHF [facility]congestive [facility] failure) [facility])     CKD (chronic kidney disease) stage 3, GFR 30-59 ml/min ([facility])     Clostridium difficile diarrhea 3/16/12; 12    positive stool toxin    Diabetes mellitus [facility]     Diabetic neuropathy ([facility])     feet and legs    Disease of blood and blood forming organ     Dizziness     When he moves his head/ loses his balance    GERD (gastroesophageal reflux disease)     gastric ulcer    Headache     Hearing loss     Hyperlipidemia     Hypertension     Kidney stone     Refusal of blood transfusions as patient is Denominational     Sleep apnea     Tinnitus     Venous ulcer ([facility]     LLE    Wound, open 2012       PAST SURGICAL HISTORY    Past Surgical History:   Procedure Laterality Date    CARDIAC SURGERY      Dec 27 2010, triple bypass    CHOLECYSTECTOMY  2011    HERNIA REPAIR  age2    OTHER SURGICAL HISTORY  11-16-11 REPAIR LOWER STERNAL INCISION, REMOVAL OF ONE STERNAL WIRE,    OTHER SURGICAL HISTORY  10/10/2014    phacoemulsification of cataract with intraocular lens implant left eye    UPPER GASTROINTESTINAL ENDOSCOPY         FAMILY HISTORY    Family History   Problem Relation Age of Onset    Heart Disease Mother     Heart Disease Father     Cancer Father     Diabetes Brother     Diabetes Brother        SOCIAL HISTORY    Social History     Tobacco Use    Smoking status: Former Smoker     Packs/day: 1.00     Years: 16.00     Pack years: 16.00     Quit date: 1/10/1988     Years since quittin.0    Smokeless tobacco: Never Used    Tobacco comment: 22 yrs ago   Vaping Use    Vaping Use: Never used   Substance Use Topics    Alcohol use: No     Alcohol/week: 0.0 standard drinks    Drug use: No       ALLERGIES    Allergies   Allergen Reactions    Amitriptyline Other (See Comments)     tremor    Celecoxib      Hyperkalemia    Cymbalta [Duloxetine Hcl] Other (See Comments)     Tremors and slurred speech    Elavil [Amitriptyline Hcl]      tremor    Gabapentin      Tremor at high dose    Nsaids      Gastric ulcer       MEDICATIONS    No current facility-administered medications on file prior to encounter. Current Outpatient Medications on File Prior to Encounter   Medication Sig Dispense Refill    oxyCODONE-acetaminophen (PERCOCET) 5-325 MG per tablet Take 1 tablet by mouth 4 times daily for 30 days.  120 tablet 0    naloxone 4 MG/0.1ML LIQD nasal spray 1 spray by Nasal route as needed for Opioid Reversal 1 each 0    carvedilol (COREG) 12.5 MG tablet Take 1 tablet by mouth 2 times daily (with meals) 60 tablet 3    spironolactone (ALDACTONE) 25 MG tablet Take 1 tablet by mouth daily 30 tablet 3    metOLazone (ZAROXOLYN) 2.5 MG tablet Take 1 tablet by mouth every other day 15 tablet 3    torsemide (DEMADEX) 100 MG tablet Take 0.5 tablets by mouth 2 times daily 30 tablet 3    atorvastatin (LIPITOR) 40 MG tablet Take 1 tablet by mouth nightly 30 tablet 3    linaCLOtide (LINZESS PO) Take by mouth      clotrimazole (LOTRIMIN) 1 % external solution Apply 4 drops to the left ear twice daily (morning and evening) for 10 days 29.57 mL 1    acetaminophen (TYLENOL) 500 MG tablet Take 2 tablets by mouth 4 times daily as needed for Pain 60 tablet 0    hydrocortisone (ANUSOL-HC) 2.5 % rectal cream Place rectally 2 times daily.  1 Tube 0    senna (SENOKOT) 8.6 MG TABS tablet Take 1 tablet by mouth 2 times daily 120 tablet 0    colchicine (COLCRYS) 0.6 MG tablet Take 1 tablet by mouth daily 30 tablet 3    docusate sodium (COLACE, DULCOLAX) 100 MG CAPS Take 100 mg by mouth 2 times daily      insulin aspart (NOVOLOG) 100 UNIT/ML injection vial Inject into the skin 3 times daily (before meals) 50 units with breakfast, 20 units with lunch and 60 units with dinner      allopurinol (ZYLOPRIM) 100 MG tablet Take 300 mg by mouth       insulin glargine (LANTUS) 100 UNIT/ML injection vial Inject 55 Units into the skin 2 times daily (Patient taking differently: Inject 80 Units into the skin 2 times daily ) 1 vial 1    Compression Stockings MISC by Does not apply route 2 pair, knee high compression stockings, fitted/ zippered 2 each 1    pregabalin (LYRICA) 50 MG capsule Take 1 capsule by mouth Daily with supper 60 capsule 3    silver sulfADIAZINE (SILVADENE) 1 % cream Apply to BL lower leg ulcers daily 20 g 0    nitroGLYCERIN (NITROSTAT) 0.4 MG SL tablet Place 1 tablet under the tongue every 5 minutes as needed 25 tablet 1    fluticasone (FLONASE) 50 MCG/ACT nasal spray 1 spray by Nasal route nightly as needed.  aspirin 81 MG chewable tablet Take 1 tablet by mouth daily. 30 tablet     isosorbide mononitrate (IMDUR) 30 MG CR tablet Take 1 tablet by mouth daily. 30 tablet 0    ferrous sulfate 325 (65 FE) MG tablet Take 325 mg by mouth 3 times daily (with meals).  omeprazole (PRILOSEC) 20 MG capsule Take 20 mg by mouth 2 times daily. Objective  Sitting up in chair. Returned to bed for evaluation. /73   Pulse 64   Temp 98.6 °F (37 °C) (Oral)   Resp 18   Wt 295 lb 8 oz (134 kg)   SpO2 91%   BMI 44.93 kg/m²     LABS:  WBC:    Lab Results   Component Value Date    WBC 7.6 01/14/2022     H/H:    Lab Results   Component Value Date    HGB 10.5 01/14/2022    HCT 32.1 01/14/2022     PTT:    Lab Results   Component Value Date    APTT 32.9 01/05/2013   [APTT}  PT/INR:    Lab Results   Component Value Date    PROTIME 13.3 09/17/2015    INR 1.22 09/17/2015     HgBA1c:    Lab Results   Component Value Date    LABA1C 7.6 01/13/2022       Assessment  Only noted open ulcers on left lower inner and outer leg. Right lower leg a few dried ulcers. Left foot toe bumped it and a small skin tear present. See photos. Hemosiderin staining and edema in lower legs.    John Risk Score:      Patient Active Problem List   Diagnosis    DM (diabetes mellitus) ([address]    Coronary artery disease involving native coronary artery of native heart without angina pectoris    Anemia of chronic disease    Essential hypertension    Hyperkalemia    Chronic diastolic heart failure ([address]    Pulmonary hypertension due to left ventricular diastolic dysfunction ([address]    Morbid obesity due to excess calories ([address]    S/P CABG x 3    DM2 (diabetes mellitus, type 2) ([address]    Morbid obesity with BMI of 50.0-59.9, adult (HCC)    HLD (hyperlipidemia)    Cardiomyopathy (HCC)    Productive cough    Chronic pain    Severe Description not for Pressure Injury Partial thickness 01/14/22 1822   Number of days: 0      Left lower inner leg:           Wound 01/14/22 Leg Left; Lower; Lateral (Active)   Wound Image   01/14/22 1822   Wound Etiology Venous 01/14/22 1822   Dressing Status New dressing applied 01/14/22 1822   Wound Cleansed Soap and water 01/14/22 1822   Dressing/Treatment Cast padding;Calmoseptine/zinc oxide with menthol;Roll gauze 01/14/22 1822   Dressing Change Due 01/18/22 01/14/22 1822   Wound Length (cm) 2 cm 01/14/22 1822   Wound Width (cm) 1 cm 01/14/22 1822   Wound Depth (cm) 0.1 cm 01/14/22 1822   Wound Surface Area (cm^2) 2 cm^2 01/14/22 1822   Wound Volume (cm^3) 0.2 cm^3 01/14/22 1822   Distance Tunneling (cm) 0 cm 01/14/22 1822   Tunneling Position ___ O'Clock 0 01/14/22 1822   Undermining Starts ___ O'Clock 0 01/14/22 1822   Undermining Ends___ O'Clock 0 01/14/22 1822   Undermining Maxium Distance (cm) 0 01/14/22 1822   Wound Assessment Pink/red 01/14/22 1822   Drainage Amount Small 01/14/22 1822   Drainage Description Serosanguinous 01/14/22 1822   Odor None 01/14/22 1822   Radha-wound Assessment Fragile; Hemosiderin staining (brown yellow);Edematous 01/14/22 1822   Margins Attached edges; Defined edges;Flat/open edges 01/14/22 1822   Wound Thickness Description not for Pressure Injury Partial thickness 01/14/22 1822   Number of days: 0     Left lower lateral leg:      Right lower lateral leg:             Response to treatment:  Well tolerated by patient. Pain Assessment:  Severity:  0 / 10  Quality of pain: N/A  Wound Pain Timing/Severity: none  Premedicated: No    Plan   Plan of Care: Wound 01/14/22 Leg Left; Lower; Inner-Dressing/Treatment: Cast padding,Roll gauze  Wound 01/14/22 Leg Left; Lower; Lateral-Dressing/Treatment: Cast padding,Calmoseptine/zinc oxide with menthol,Roll gauze (unna boot)     Discussed case with MD and Dr Rebecca Frederick ordered unna boots to be changes 2 times a week.     Recommend:  Wound Care RN to place unna boots twice weekly to bilateral lower legs on Tuesdays ad Fridays. Alginate Ag to open ulcers prior to application. Cover with roll guaze. First application done today    Specialty Bed Required : Yes   [] Low Air Loss   [x] Pressure Redistribution  [] Fluid Immersion  [] Bariatric  [] Total Pressure Relief  [] Other:     Current Diet: ADULT DIET; Regular; 4 carb choices (60 gm/meal); Low Sodium (2 gm)  Dietician consult:  Yes    Discharge Plan:  Placement for patient upon discharge: skilled nursing    Patient appropriate for Outpatient [address]: Yes    Referrals:  []  / discharge planner following  [] [address] [place] [facility]  [] Supplies  [] Other    Patient/Caregiver Teaching: Instructed to continue treatment as ordered at home.   Level of patient/caregiver understanding able to:   [x] Indicates understanding       [] Needs reinforcement  [] Unsuccessful      [] Verbal Understanding  [] Demonstrated understanding       [] No evidence of learning  [] Refused teaching         [] N/A       Electronically signed by Osiris Samaniego, RN, MSN, Fransico Corley on 1/14/2022 at 6:27 PM

## 2022-01-14 NOTE — PROGRESS NOTES
Hospitalist Progress Note      PCP: Mariola Fernandez, APRN - CNP    Date of Admission: 1/13/2022    Chief Complaint: dyspnea       Subjective:  He isn't having a good day. Overwhelmed with all the inconveniences of being in the hospital.  Unclear how much he might be diuresing. I think he probably needs to lose more, increased the gtt.         Medications:  Reviewed    Infusion Medications    dextrose      sodium chloride      furosemide (LASIX) 1mg/ml infusion 5 mg/hr (01/13/22 2322)     Scheduled Medications    insulin glargine  55 Units SubCUTAneous BID    iron sucrose (VENOFER) iv piggyback 100 mL (Admin over 60 minutes)  100 mg IntraVENous Q24H    allopurinol  300 mg Oral Daily    aspirin  81 mg Oral Daily    atorvastatin  40 mg Oral Nightly    carvedilol  12.5 mg Oral BID WC    colchicine  0.6 mg Oral Daily    docusate sodium  100 mg Oral BID    insulin lispro  10 Units SubCUTAneous TID WC    sodium chloride flush  5-40 mL IntraVENous 2 times per day    enoxaparin  40 mg SubCUTAneous Daily    insulin lispro  0-18 Units SubCUTAneous TID WC    insulin lispro  0-9 Units SubCUTAneous Nightly    isosorbide mononitrate  30 mg Oral Daily    metOLazone  2.5 mg Oral Every Other Day    pantoprazole  40 mg Oral QAM AC    pregabalin  50 mg Oral Dinner    senna  1 tablet Oral BID    spironolactone  25 mg Oral Daily    hydrALAZINE  10 mg Oral 3 times per day     PRN Meds: glucose, dextrose, glucagon (rDNA), dextrose, sodium chloride flush, sodium chloride, polyethylene glycol, acetaminophen **OR** acetaminophen, prochlorperazine, hydrALAZINE, oxyCODONE-acetaminophen, calcium carbonate, perflutren lipid microspheres      Intake/Output Summary (Last 24 hours) at 1/14/2022 1500  Last data filed at 1/14/2022 0034  Gross per 24 hour   Intake --   Output 1000 ml   Net -1000 ml       Physical Exam Performed:    /73   Pulse 64   Temp 98.6 °F (37 °C) (Oral)   Resp 18   Wt 295 lb 8 oz (134 kg)   SpO2 91%   BMI 44.93 kg/m²       General appearance:  No apparent distress, appears stated age. HEENT:  Normal cephalic, atraumatic without obvious deformity. Pupils equal, round, and reactive to light. Extra ocular muscles intact. Conjunctivae/corneas clear. 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 Neck: Supple, with full range of motion. No jugular venous distention. Trachea midline. Respiratory:  Normal respiratory effort. Bilaterally without Wheezes/Rhonchi. Prominent bibasilar rales. Cardiovascular:  Regular rate and rhythm with normal S1/S2 without rubs or gallops. 3/6 systolic murmur at the LSB. Abdomen: Soft, non-tender, non-distended with normal bowel sounds. Musculoskeletal:  No clubbing, cyanosis. BLE pitting edema. Full range of motion without deformity. Skin: Skin color, texture, turgor normal.  No rashes or lesions. Neurologic:  Neurovascularly intact without any focal sensory/motor deficits. Cranial nerves: II-XII intact except as above, grossly non-focal.  Psychiatric:  Alert and oriented, thought content appropriate, excellent insight and historian. Overwhelmed, anxious. Capillary Refill: Brisk,3 seconds, normal  Peripheral Pulses: +2 palpable, equal bilaterally       Labs:   Recent Labs     01/13/22  1220 01/14/22  0736   WBC 7.2 7.6   HGB 10.7* 10.5*   HCT 33.1* 32.1*    189     Recent Labs     01/13/22  1220 01/14/22  0735   * 141   K 5.4* 4.6   CL 94* 99   CO2 28 33*   BUN 63* 53*   CREATININE 1.4* 1.3   CALCIUM 9.3 9.4     Recent Labs     01/13/22  1220   AST 27   ALT 11   BILITOT 0.5   ALKPHOS 98     No results for input(s): INR in the last 72 hours.   Recent Labs     01/13/22  1220   TROPONINI 0.04*       Urinalysis:      Lab Results   Component Value Date    NITRU Negative 07/08/2019    WBCUA 0-2 11/05/2013    BACTERIA Rare 11/05/2013    RBCUA 0-2 11/05/2013    BLOODU Negative 07/08/2019    SPECGRAV 1.010 07/08/2019    GLUCOSEU Negative 07/08/2019 GLUCOSEU NEGATIVE 02/29/2012       Radiology:  CT CHEST PULMONARY EMBOLISM W CONTRAST   Final Result   1. No CT evidence of a pulmonary embolism. 2. Findings are consistent with mild to moderate CHF, including small   bilateral pleural effusions, mild-to-moderate interstitial pulmonary edema,   and cardiomegaly. 3. Multifocal ground-glass and consolidative opacities likely reflect a   combination of atelectasis, asymmetric edema, and mild multifocal pneumonia. 4. Mild mediastinal and bilateral hilar lymphadenopathy is most likely benign   and reactive in etiology given the patient's lung findings. 5. New scattered subcentimeter nodules throughout both lungs, the largest   measuring approximately 7 mm on average within the right upper lobe. These   are most likely benign and infectious or inflammatory in etiology. However,   further follow-up of these nodules is as advised below. 6. Stable moderate splenomegaly. RECOMMENDATIONS:   Fleischner Society guidelines for follow-up and management of incidentally   detected pulmonary nodules:      Multiple Solid Nodules:      Nodule size equals 6-8 mm   In a low-risk patient, CT at 3-6 months, then consider CT at 18-24 months. In a high-risk patient, CT at 3-6 months, then CT at 18-24 months. Radiology 2017 http://pubs. rsna.org/doi/full/10.1148/radiol. 8993392744         XR CHEST PORTABLE   Final Result   Cardiomegaly with vascular congestion and airspace disease. While this could   all represent congestive failure, there is asymmetric airspace disease right   parahilar concerning for possible superimposed pneumonia or aspiration. In   addition, there is a somewhat nodular appearance to some of the findings. RECOMMENDATION:   Follow-up is recommended to include baseline films after resolution of   symptoms. If the nodular component persists, follow-up with CT recommended.                  Assessment/Plan:    Active Hospital Problems    Diagnosis  Dyspnea [R06.00]     Acute diastolic CHF (congestive heart failure) (HCC) [I50.31]        The patient is a pleasant 78 Y M with a h/o former smoking, HTN, HLD, DM2, CAD s/p CABG in 2010, and CHF. He presents with about a week of worsening dyspnea. Significant orthopnea and paroxysmal nocturnal dyspnea. He feels edematous, particularly in his abdomen. He has been gaining an unknown amount of weight. He is compliant with his medications. Denies significant cough. No fevers or chills. CT in the ED confirmed pulmonary edema and he had prominent rales on exam.  No chest pain. Acute on chronic diastolic CHF  - he normally takes torsemide 50 BID (changed to furosemide gtt at 10mg/hr here), metolazone 2.5 QOD (continued here), and spironolactone 25 QD (continued here). - he isn't on ACEi/ARB due to unstable renal function. Started hydralazine. Continue ISMN. - continued carvedilol  - in 8/2021 he was admitted with CHF and diuresed from 311 to 297 lbs over the course of a week. - TTE 8/2021 showed normal EF and moderate AS. F/u repeat TTE.  - he has pulmonary HTN and isn't able to tolerate BiPAP for his HENNA. - iron sucrose IV, then resume oral med     Acute on chronic hypoxic respiratory failure. Due to above, treat accordingly. Wean to baseline 3LNC.     CKD3. Baseline Cr perhaps about 1.4, monitor.     DM2. Adjusted insulin regimen while here. A1c 7.6.     CAD. Aspirin, statin, carvedilol, ISMN.    Incidental finding of small lung nodules. Due to his smoking history, radiology recommended that he have another CT in 3-6 months. Defer to PCP.     Of note, he is [employer]'s witness and does not want transfusions. DVT Prophylaxis: enoxaparin  Diet: ADULT DIET; Regular; 4 carb choices (60 gm/meal); Low Sodium (2 gm)  Code Status: Full Code    PT/OT Eval Status: rec'd home PT/OT. Dispo - when he is adequately diuresed. Perhaps 1/16 - 1/22. He lives at home and has [facility].       JASON PONCE Kevin Garvin MD

## 2022-01-15 LAB
ANION GAP SERPL CALCULATED.3IONS-SCNC: 10 MMOL/L (ref 3–16)
BUN BLDV-MCNC: 48 MG/DL (ref 7–20)
CALCIUM SERPL-MCNC: 9.9 MG/DL (ref 8.3–10.6)
CHLORIDE BLD-SCNC: 97 MMOL/L (ref 99–110)
CO2: 34 MMOL/L (ref 21–32)
CREAT SERPL-MCNC: 1.4 MG/DL (ref 0.8–1.3)
GFR AFRICAN AMERICAN: >60
GFR NON-AFRICAN AMERICAN: 50
GLUCOSE BLD-MCNC: 188 MG/DL (ref 70–99)
GLUCOSE BLD-MCNC: 188 MG/DL (ref 70–99)
GLUCOSE BLD-MCNC: 252 MG/DL (ref 70–99)
GLUCOSE BLD-MCNC: 277 MG/DL (ref 70–99)
GLUCOSE BLD-MCNC: 292 MG/DL (ref 70–99)
MAGNESIUM: 1.9 MG/DL (ref 1.8–2.4)
PERFORMED ON: ABNORMAL
POTASSIUM SERPL-SCNC: 3.8 MMOL/L (ref 3.5–5.1)
SODIUM BLD-SCNC: 141 MMOL/L (ref 136–145)

## 2022-01-15 PROCEDURE — 36415 COLL VENOUS BLD VENIPUNCTURE: CPT

## 2022-01-15 PROCEDURE — 1200000000 HC SEMI PRIVATE

## 2022-01-15 PROCEDURE — 83735 ASSAY OF MAGNESIUM: CPT

## 2022-01-15 PROCEDURE — 6360000002 HC RX W HCPCS: Performed by: INTERNAL MEDICINE

## 2022-01-15 PROCEDURE — 6370000000 HC RX 637 (ALT 250 FOR IP): Performed by: INTERNAL MEDICINE

## 2022-01-15 PROCEDURE — 2700000000 HC OXYGEN THERAPY PER DAY

## 2022-01-15 PROCEDURE — 2580000003 HC RX 258: Performed by: INTERNAL MEDICINE

## 2022-01-15 PROCEDURE — 94761 N-INVAS EAR/PLS OXIMETRY MLT: CPT

## 2022-01-15 PROCEDURE — 80048 BASIC METABOLIC PNL TOTAL CA: CPT

## 2022-01-15 RX ORDER — METOLAZONE 2.5 MG/1
2.5 TABLET ORAL EVERY OTHER DAY
Status: DISCONTINUED | OUTPATIENT
Start: 2022-01-15 | End: 2022-01-17 | Stop reason: HOSPADM

## 2022-01-15 RX ORDER — INSULIN GLARGINE 100 [IU]/ML
60 INJECTION, SOLUTION SUBCUTANEOUS 2 TIMES DAILY
Status: DISCONTINUED | OUTPATIENT
Start: 2022-01-15 | End: 2022-01-17 | Stop reason: HOSPADM

## 2022-01-15 RX ADMIN — CARVEDILOL 12.5 MG: 3.12 TABLET, FILM COATED ORAL at 17:22

## 2022-01-15 RX ADMIN — HYDRALAZINE HYDROCHLORIDE 10 MG: 10 TABLET, FILM COATED ORAL at 07:51

## 2022-01-15 RX ADMIN — INSULIN LISPRO 10 UNITS: 100 INJECTION, SOLUTION INTRAVENOUS; SUBCUTANEOUS at 08:23

## 2022-01-15 RX ADMIN — INSULIN GLARGINE 55 UNITS: 100 INJECTION, SOLUTION SUBCUTANEOUS at 13:04

## 2022-01-15 RX ADMIN — DOCUSATE SODIUM 100 MG: 100 CAPSULE ORAL at 20:52

## 2022-01-15 RX ADMIN — HYDRALAZINE HYDROCHLORIDE 10 MG: 10 TABLET, FILM COATED ORAL at 20:53

## 2022-01-15 RX ADMIN — INSULIN LISPRO 10 UNITS: 100 INJECTION, SOLUTION INTRAVENOUS; SUBCUTANEOUS at 17:22

## 2022-01-15 RX ADMIN — PANTOPRAZOLE SODIUM 40 MG: 40 TABLET, DELAYED RELEASE ORAL at 07:51

## 2022-01-15 RX ADMIN — SENNOSIDES 8.6 MG: 8.6 TABLET, COATED ORAL at 09:08

## 2022-01-15 RX ADMIN — INSULIN LISPRO 2 UNITS: 100 INJECTION, SOLUTION INTRAVENOUS; SUBCUTANEOUS at 20:55

## 2022-01-15 RX ADMIN — CARVEDILOL 12.5 MG: 3.12 TABLET, FILM COATED ORAL at 09:08

## 2022-01-15 RX ADMIN — INSULIN LISPRO 9 UNITS: 100 INJECTION, SOLUTION INTRAVENOUS; SUBCUTANEOUS at 08:23

## 2022-01-15 RX ADMIN — SENNOSIDES 8.6 MG: 8.6 TABLET, COATED ORAL at 20:52

## 2022-01-15 RX ADMIN — IRON SUCROSE 100 MG: 20 INJECTION, SOLUTION INTRAVENOUS at 21:03

## 2022-01-15 RX ADMIN — ENOXAPARIN SODIUM 40 MG: 40 INJECTION SUBCUTANEOUS at 13:10

## 2022-01-15 RX ADMIN — OXYCODONE HYDROCHLORIDE AND ACETAMINOPHEN 1 TABLET: 5; 325 TABLET ORAL at 03:57

## 2022-01-15 RX ADMIN — INSULIN LISPRO 10 UNITS: 100 INJECTION, SOLUTION INTRAVENOUS; SUBCUTANEOUS at 13:04

## 2022-01-15 RX ADMIN — ASPIRIN 81 MG 81 MG: 81 TABLET ORAL at 09:08

## 2022-01-15 RX ADMIN — INSULIN GLARGINE 60 UNITS: 100 INJECTION, SOLUTION SUBCUTANEOUS at 20:56

## 2022-01-15 RX ADMIN — INSULIN LISPRO 9 UNITS: 100 INJECTION, SOLUTION INTRAVENOUS; SUBCUTANEOUS at 13:03

## 2022-01-15 RX ADMIN — DOCUSATE SODIUM 100 MG: 100 CAPSULE ORAL at 09:08

## 2022-01-15 RX ADMIN — ALLOPURINOL 300 MG: 300 TABLET ORAL at 09:08

## 2022-01-15 RX ADMIN — INSULIN LISPRO 3 UNITS: 100 INJECTION, SOLUTION INTRAVENOUS; SUBCUTANEOUS at 17:22

## 2022-01-15 RX ADMIN — SPIRONOLACTONE 25 MG: 25 TABLET, FILM COATED ORAL at 09:08

## 2022-01-15 RX ADMIN — ISOSORBIDE MONONITRATE 30 MG: 30 TABLET, EXTENDED RELEASE ORAL at 09:08

## 2022-01-15 RX ADMIN — Medication 10 ML: at 21:01

## 2022-01-15 RX ADMIN — ATORVASTATIN CALCIUM 40 MG: 40 TABLET, FILM COATED ORAL at 20:53

## 2022-01-15 ASSESSMENT — PAIN SCALES - GENERAL: PAINLEVEL_OUTOF10: 8

## 2022-01-15 NOTE — PROGRESS NOTES
Hospitalist Progress Note      PCP: Adalberto Stephen APRN - CNP    Date of Admission: 1/13/2022    Chief Complaint: dyspnea       Subjective:  He seems to be diuresing well. Renal panel ordered.   He is in slightly better spirits, but still frustrated in general.      Medications:  Reviewed    Infusion Medications    dextrose      sodium chloride      furosemide (LASIX) 1mg/ml infusion 10 mg/hr (01/14/22 7330)     Scheduled Medications    metOLazone  2.5 mg Oral Every Other Day    linaclotide  290 mcg Oral QAM AC    insulin glargine  55 Units SubCUTAneous BID    iron sucrose (VENOFER) iv piggyback 100 mL (Admin over 60 minutes)  100 mg IntraVENous Q24H    gelocast unnas boot  2 each Other Once per day on Tue Fri    allopurinol  300 mg Oral Daily    aspirin  81 mg Oral Daily    atorvastatin  40 mg Oral Nightly    carvedilol  12.5 mg Oral BID WC    colchicine  0.6 mg Oral Daily    docusate sodium  100 mg Oral BID    insulin lispro  10 Units SubCUTAneous TID WC    sodium chloride flush  5-40 mL IntraVENous 2 times per day    enoxaparin  40 mg SubCUTAneous Daily    insulin lispro  0-18 Units SubCUTAneous TID WC    insulin lispro  0-9 Units SubCUTAneous Nightly    isosorbide mononitrate  30 mg Oral Daily    pantoprazole  40 mg Oral QAM AC    pregabalin  50 mg Oral Dinner    senna  1 tablet Oral BID    spironolactone  25 mg Oral Daily    hydrALAZINE  10 mg Oral 3 times per day     PRN Meds: glucose, dextrose, glucagon (rDNA), dextrose, sodium chloride flush, sodium chloride, polyethylene glycol, acetaminophen **OR** acetaminophen, prochlorperazine, hydrALAZINE, oxyCODONE-acetaminophen, calcium carbonate, perflutren lipid microspheres      Intake/Output Summary (Last 24 hours) at 1/15/2022 1303  Last data filed at 1/15/2022 0402  Gross per 24 hour   Intake 785.15 ml   Output 2400 ml   Net -1614.85 ml       Physical Exam Performed:    BP (!) 145/75   Pulse 77   Temp 98 °F (36.7 °C) (Oral)   Resp 18   Wt 291 lb 14.4 oz (132.4 kg)   SpO2 95%   BMI 44.38 kg/m²       General appearance:  No apparent distress, appears stated age. HEENT:  Normal cephalic, atraumatic without obvious deformity. Pupils equal, round, and reactive to light. Extra ocular muscles intact. Conjunctivae/corneas clear. 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 Neck: Supple, with full range of motion. No jugular venous distention. Trachea midline. Respiratory:  Normal respiratory effort. Bilaterally without Wheezes/Rhonchi. Prominent bibasilar rales. Cardiovascular:  Regular rate and rhythm with normal S1/S2 without rubs or gallops. 2/6 systolic murmur at the LSB. Abdomen: Soft, non-tender, non-distended with normal bowel sounds. Musculoskeletal:  No clubbing, cyanosis. BLE pitting edema. Full range of motion without deformity. Skin: Skin color, texture, turgor normal.  No rashes or lesions. Neurologic:  Neurovascularly intact without any focal sensory/motor deficits. Cranial nerves: II-XII intact except as above, grossly non-focal.  Psychiatric:  Alert and oriented, thought content appropriate, excellent insight and historian. Overwhelmed, anxious. Capillary Refill: Brisk,3 seconds, normal  Peripheral Pulses: +2 palpable, equal bilaterally       Labs:   Recent Labs     01/13/22  1220 01/14/22  0736   WBC 7.2 7.6   HGB 10.7* 10.5*   HCT 33.1* 32.1*    189     Recent Labs     01/13/22  1220 01/14/22  0735   * 141   K 5.4* 4.6   CL 94* 99   CO2 28 33*   BUN 63* 53*   CREATININE 1.4* 1.3   CALCIUM 9.3 9.4     Recent Labs     01/13/22  1220   AST 27   ALT 11   BILITOT 0.5   ALKPHOS 98     No results for input(s): INR in the last 72 hours.   Recent Labs     01/13/22  1220   TROPONINI 0.04*       Urinalysis:      Lab Results   Component Value Date    NITRU Negative 07/08/2019    WBCUA 0-2 11/05/2013    BACTERIA Rare 11/05/2013    RBCUA 0-2 11/05/2013    BLOODU Negative 07/08/2019    SPECGRAV 1.010 07/08/2019    GLUCOSEU Negative 07/08/2019    GLUCOSEU NEGATIVE 02/29/2012       Radiology:  CT CHEST PULMONARY EMBOLISM W CONTRAST   Final Result   1. No CT evidence of a pulmonary embolism. 2. Findings are consistent with mild to moderate CHF, including small   bilateral pleural effusions, mild-to-moderate interstitial pulmonary edema,   and cardiomegaly. 3. Multifocal ground-glass and consolidative opacities likely reflect a   combination of atelectasis, asymmetric edema, and mild multifocal pneumonia. 4. Mild mediastinal and bilateral hilar lymphadenopathy is most likely benign   and reactive in etiology given the patient's lung findings. 5. New scattered subcentimeter nodules throughout both lungs, the largest   measuring approximately 7 mm on average within the right upper lobe. These   are most likely benign and infectious or inflammatory in etiology. However,   further follow-up of these nodules is as advised below. 6. Stable moderate splenomegaly. RECOMMENDATIONS:   Fleischner Society guidelines for follow-up and management of incidentally   detected pulmonary nodules:      Multiple Solid Nodules:      Nodule size equals 6-8 mm   In a low-risk patient, CT at 3-6 months, then consider CT at 18-24 months. In a high-risk patient, CT at 3-6 months, then CT at 18-24 months. Radiology 2017 http://pubs. rsna.org/doi/full/10.1148/radiol. [id number]         XR CHEST PORTABLE   Final Result   Cardiomegaly with vascular congestion and airspace disease. While this could   all represent congestive failure, there is asymmetric airspace disease right   parahilar concerning for possible superimposed pneumonia or aspiration. In   addition, there is a somewhat nodular appearance to some of the findings. RECOMMENDATION:   Follow-up is recommended to include baseline films after resolution of   symptoms. If the nodular component persists, follow-up with CT recommended. Assessment/Plan:    Active Hospital Problems    Diagnosis     Dyspnea [R06.00]     Acute diastolic CHF (congestive heart failure) ([facility]) [I50.31]        The patient is a pleasant 78 Y M with a h/o former smoking, HTN, HLD, DM2, CAD s/p CABG in 2010, and CHF. He presents with about a week of worsening dyspnea. Significant orthopnea and paroxysmal nocturnal dyspnea. He feels edematous, particularly in his abdomen. He has been gaining an unknown amount of weight. He is compliant with his medications. Denies significant cough. No fevers or chills. CT in the ED confirmed pulmonary edema and he had prominent rales on exam.  No chest pain. Acute on chronic diastolic CHF  - he normally takes torsemide 50 BID (changed to furosemide gtt at 10mg/hr here), metolazone 2.5 QOD (continued here), and spironolactone 25 QD (continued here). - he isn't on ACEi/ARB due to unstable renal function. Started hydralazine. Continue ISMN. - continued carvedilol  - in 8/2021 he was admitted with CHF and diuresed from 311 to 297 lbs over the course of a week. - TTE 8/2021 showed normal EF and moderate AS. Repeat TTE here didn't show any significant changes. - he has pulmonary HTN but isn't able to tolerate BiPAP for his HENNA, which is complicating his CHF. - iron sucrose IV, then resume oral med     Acute on chronic hypoxic respiratory failure. Due to above, treat accordingly. Wean to baseline 3.5 LNC.     CKD3. Baseline Cr perhaps about 1.4, monitor.     DM2. Adjusted insulin regimen while here. A1c 7.6.     Constipation. Bowel regimen. May consider enema or relistor if no progress. CAD. Aspirin, statin, carvedilol, ISMN.    Incidental finding of small lung nodules. Due to his smoking history, radiology recommended that he have another CT in 3-6 months. Defer to PCP.     Of note, he is [employer]'s witness and does not want transfusions. DVT Prophylaxis: enoxaparin  Diet: ADULT DIET;  Regular; 4 carb choices (60 gm/meal); Low Sodium (2 gm)  Code Status: Full Code    PT/OT Eval Status: rec'd home PT/OT. Dispo - when he is adequately diuresed. Perhaps 1/16 - 1/22. [facility]       Sanket Lorenzana MD

## 2022-01-15 NOTE — PROGRESS NOTES
Patient refused lyrica after already being removed from the package. Wasted in drug buster container and marked as waste in the [employer] with [employer] as witness.

## 2022-01-16 LAB
ANION GAP SERPL CALCULATED.3IONS-SCNC: 12 MMOL/L (ref 3–16)
BUN BLDV-MCNC: 49 MG/DL (ref 7–20)
CALCIUM SERPL-MCNC: 10.1 MG/DL (ref 8.3–10.6)
CHLORIDE BLD-SCNC: 95 MMOL/L (ref 99–110)
CO2: 35 MMOL/L (ref 21–32)
CREAT SERPL-MCNC: 1.5 MG/DL (ref 0.8–1.3)
GFR AFRICAN AMERICAN: 56
GFR NON-AFRICAN AMERICAN: 47
GLUCOSE BLD-MCNC: 123 MG/DL (ref 70–99)
GLUCOSE BLD-MCNC: 144 MG/DL (ref 70–99)
GLUCOSE BLD-MCNC: 191 MG/DL (ref 70–99)
GLUCOSE BLD-MCNC: 264 MG/DL (ref 70–99)
GLUCOSE BLD-MCNC: 264 MG/DL (ref 70–99)
HCT VFR BLD CALC: 36.6 % (ref 40.5–52.5)
HEMOGLOBIN: 11.8 G/DL (ref 13.5–17.5)
MAGNESIUM: 1.9 MG/DL (ref 1.8–2.4)
MCH RBC QN AUTO: 28.6 PG (ref 26–34)
MCHC RBC AUTO-ENTMCNC: 32.3 G/DL (ref 31–36)
MCV RBC AUTO: 88.6 FL (ref 80–100)
PDW BLD-RTO: 17.7 % (ref 12.4–15.4)
PERFORMED ON: ABNORMAL
PLATELET # BLD: 221 K/UL (ref 135–450)
PMV BLD AUTO: 8.2 FL (ref 5–10.5)
POTASSIUM SERPL-SCNC: 3.9 MMOL/L (ref 3.5–5.1)
RBC # BLD: 4.13 M/UL (ref 4.2–5.9)
SODIUM BLD-SCNC: 142 MMOL/L (ref 136–145)
WBC # BLD: 7 K/UL (ref 4–11)

## 2022-01-16 PROCEDURE — 94761 N-INVAS EAR/PLS OXIMETRY MLT: CPT

## 2022-01-16 PROCEDURE — 6370000000 HC RX 637 (ALT 250 FOR IP): Performed by: INTERNAL MEDICINE

## 2022-01-16 PROCEDURE — 85027 COMPLETE CBC AUTOMATED: CPT

## 2022-01-16 PROCEDURE — 2700000000 HC OXYGEN THERAPY PER DAY

## 2022-01-16 PROCEDURE — 2580000003 HC RX 258: Performed by: INTERNAL MEDICINE

## 2022-01-16 PROCEDURE — 80048 BASIC METABOLIC PNL TOTAL CA: CPT

## 2022-01-16 PROCEDURE — 83735 ASSAY OF MAGNESIUM: CPT

## 2022-01-16 PROCEDURE — 36415 COLL VENOUS BLD VENIPUNCTURE: CPT

## 2022-01-16 PROCEDURE — 1200000000 HC SEMI PRIVATE

## 2022-01-16 PROCEDURE — 6360000002 HC RX W HCPCS: Performed by: INTERNAL MEDICINE

## 2022-01-16 RX ADMIN — HYDRALAZINE HYDROCHLORIDE 10 MG: 10 TABLET, FILM COATED ORAL at 06:02

## 2022-01-16 RX ADMIN — INSULIN GLARGINE 60 UNITS: 100 INJECTION, SOLUTION SUBCUTANEOUS at 09:57

## 2022-01-16 RX ADMIN — ISOSORBIDE MONONITRATE 30 MG: 30 TABLET, EXTENDED RELEASE ORAL at 09:55

## 2022-01-16 RX ADMIN — PANTOPRAZOLE SODIUM 40 MG: 40 TABLET, DELAYED RELEASE ORAL at 06:02

## 2022-01-16 RX ADMIN — INSULIN LISPRO 10 UNITS: 100 INJECTION, SOLUTION INTRAVENOUS; SUBCUTANEOUS at 09:56

## 2022-01-16 RX ADMIN — INSULIN GLARGINE 60 UNITS: 100 INJECTION, SOLUTION SUBCUTANEOUS at 21:15

## 2022-01-16 RX ADMIN — ASPIRIN 81 MG 81 MG: 81 TABLET ORAL at 09:55

## 2022-01-16 RX ADMIN — INSULIN LISPRO 9 UNITS: 100 INJECTION, SOLUTION INTRAVENOUS; SUBCUTANEOUS at 13:09

## 2022-01-16 RX ADMIN — INSULIN LISPRO 10 UNITS: 100 INJECTION, SOLUTION INTRAVENOUS; SUBCUTANEOUS at 13:09

## 2022-01-16 RX ADMIN — INSULIN LISPRO 10 UNITS: 100 INJECTION, SOLUTION INTRAVENOUS; SUBCUTANEOUS at 17:54

## 2022-01-16 RX ADMIN — OXYCODONE HYDROCHLORIDE AND ACETAMINOPHEN 1 TABLET: 5; 325 TABLET ORAL at 21:07

## 2022-01-16 RX ADMIN — DOCUSATE SODIUM 100 MG: 100 CAPSULE ORAL at 21:07

## 2022-01-16 RX ADMIN — ATORVASTATIN CALCIUM 40 MG: 40 TABLET, FILM COATED ORAL at 21:07

## 2022-01-16 RX ADMIN — OXYCODONE HYDROCHLORIDE AND ACETAMINOPHEN 1 TABLET: 5; 325 TABLET ORAL at 01:03

## 2022-01-16 RX ADMIN — ALLOPURINOL 300 MG: 300 TABLET ORAL at 09:55

## 2022-01-16 RX ADMIN — ENOXAPARIN SODIUM 40 MG: 40 INJECTION SUBCUTANEOUS at 09:55

## 2022-01-16 RX ADMIN — CARVEDILOL 12.5 MG: 3.12 TABLET, FILM COATED ORAL at 17:52

## 2022-01-16 RX ADMIN — HYDRALAZINE HYDROCHLORIDE 10 MG: 10 TABLET, FILM COATED ORAL at 21:07

## 2022-01-16 RX ADMIN — SENNOSIDES 8.6 MG: 8.6 TABLET, COATED ORAL at 21:07

## 2022-01-16 RX ADMIN — IRON SUCROSE 100 MG: 20 INJECTION, SOLUTION INTRAVENOUS at 21:00

## 2022-01-16 RX ADMIN — CARVEDILOL 12.5 MG: 3.12 TABLET, FILM COATED ORAL at 10:00

## 2022-01-16 RX ADMIN — INSULIN LISPRO 9 UNITS: 100 INJECTION, SOLUTION INTRAVENOUS; SUBCUTANEOUS at 17:53

## 2022-01-16 RX ADMIN — INSULIN LISPRO 2 UNITS: 100 INJECTION, SOLUTION INTRAVENOUS; SUBCUTANEOUS at 21:15

## 2022-01-16 RX ADMIN — FUROSEMIDE 10 MG/HR: 10 INJECTION, SOLUTION INTRAMUSCULAR; INTRAVENOUS at 01:05

## 2022-01-16 RX ADMIN — Medication 10 ML: at 09:58

## 2022-01-16 RX ADMIN — DOCUSATE SODIUM 100 MG: 100 CAPSULE ORAL at 09:55

## 2022-01-16 RX ADMIN — SENNOSIDES 8.6 MG: 8.6 TABLET, COATED ORAL at 09:55

## 2022-01-16 RX ADMIN — SPIRONOLACTONE 25 MG: 25 TABLET, FILM COATED ORAL at 09:55

## 2022-01-16 ASSESSMENT — PAIN - FUNCTIONAL ASSESSMENT: PAIN_FUNCTIONAL_ASSESSMENT: PREVENTS OR INTERFERES SOME ACTIVE ACTIVITIES AND ADLS

## 2022-01-16 ASSESSMENT — PAIN SCALES - GENERAL
PAINLEVEL_OUTOF10: 8
PAINLEVEL_OUTOF10: 8

## 2022-01-16 ASSESSMENT — PAIN DESCRIPTION - DESCRIPTORS: DESCRIPTORS: ACHING;SHARP;CONSTANT

## 2022-01-16 ASSESSMENT — PAIN DESCRIPTION - ORIENTATION: ORIENTATION: LOWER

## 2022-01-16 ASSESSMENT — PAIN DESCRIPTION - LOCATION: LOCATION: GENERALIZED

## 2022-01-16 ASSESSMENT — PAIN DESCRIPTION - FREQUENCY: FREQUENCY: CONTINUOUS

## 2022-01-16 ASSESSMENT — PAIN DESCRIPTION - PAIN TYPE: TYPE: CHRONIC PAIN

## 2022-01-16 ASSESSMENT — PAIN DESCRIPTION - ONSET: ONSET: ON-GOING

## 2022-01-16 ASSESSMENT — PAIN DESCRIPTION - PROGRESSION: CLINICAL_PROGRESSION: GRADUALLY WORSENING

## 2022-01-16 NOTE — PROGRESS NOTES
Hospitalist Progress Note      PCP: Laura Alves, APRN - CNP    Date of Admission: 1/13/2022    Chief Complaint: dyspnea       Subjective:  He seems to be diuresing well, lost another 4 lbs. UOP probably inaccurate. He is feeling better, but still has mild dyspnea above baseline. He feels like he had more water to lose, but he is already 10 lbs under his discharge weight from last time. Will leave furosemide for now.        Medications:  Reviewed    Infusion Medications    dextrose      sodium chloride      furosemide (LASIX) 1mg/ml infusion 10 mg/hr (01/16/22 0105)     Scheduled Medications    metOLazone  2.5 mg Oral Every Other Day    linaclotide  290 mcg Oral QAM AC    insulin glargine  60 Units SubCUTAneous BID    iron sucrose (VENOFER) iv piggyback 100 mL (Admin over 60 minutes)  100 mg IntraVENous Q24H    gelocast unnas boot  2 each Other Once per day on Tue Fri    allopurinol  300 mg Oral Daily    aspirin  81 mg Oral Daily    atorvastatin  40 mg Oral Nightly    carvedilol  12.5 mg Oral BID WC    colchicine  0.6 mg Oral Daily    docusate sodium  100 mg Oral BID    insulin lispro  10 Units SubCUTAneous TID WC    sodium chloride flush  5-40 mL IntraVENous 2 times per day    enoxaparin  40 mg SubCUTAneous Daily    insulin lispro  0-18 Units SubCUTAneous TID WC    insulin lispro  0-9 Units SubCUTAneous Nightly    isosorbide mononitrate  30 mg Oral Daily    pantoprazole  40 mg Oral QAM AC    pregabalin  50 mg Oral Dinner    senna  1 tablet Oral BID    spironolactone  25 mg Oral Daily    hydrALAZINE  10 mg Oral 3 times per day     PRN Meds: glucose, dextrose, glucagon (rDNA), dextrose, sodium chloride flush, sodium chloride, polyethylene glycol, acetaminophen **OR** acetaminophen, prochlorperazine, hydrALAZINE, oxyCODONE-acetaminophen, calcium carbonate, perflutren lipid microspheres      Intake/Output Summary (Last 24 hours) at 1/16/2022 1100  Last data filed at 1/16/2022 1829  Gross per 24 hour   Intake 1437.91 ml   Output 1100 ml   Net 337.91 ml       Physical Exam Performed:    BP (!) 124/58   Pulse 62   Temp 97.9 °F (36.6 °C) (Oral)   Resp 16   Wt 287 lb (130.2 kg)   SpO2 97%   BMI 43.64 kg/m²       General appearance:  No apparent distress, appears stated age. HEENT:  Normal cephalic, atraumatic without obvious deformity. Pupils equal, round, and reactive to light. Extra ocular muscles intact. Conjunctivae/corneas clear. 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 Neck: Supple, with full range of motion. No jugular venous distention. Trachea midline. Respiratory:  Normal respiratory effort. Bilaterally without Wheezes/Rhonchi. Now only mild bibasilar rales. Cardiovascular:  Regular rate and rhythm with normal S1/S2 without rubs or gallops. 2/6 systolic murmur at the LSB. Abdomen: Soft, non-tender, non-distended with normal bowel sounds. Musculoskeletal:  No clubbing, cyanosis. BLE pitting edema has improved. Full range of motion without deformity. Skin: Skin color, texture, turgor normal.  No rashes or lesions. Neurologic:  Neurovascularly intact without any focal sensory/motor deficits. Cranial nerves: II-XII intact except as above, grossly non-focal.  Psychiatric:  Alert and oriented, thought content appropriate, excellent insight and historian. Overwhelmed, anxious. Capillary Refill: Brisk,3 seconds, normal  Peripheral Pulses: +2 palpable, equal bilaterally       Labs:   Recent Labs     01/13/22  1220 01/14/22  0736 01/16/22  0903   WBC 7.2 7.6 7.0   HGB 10.7* 10.5* 11.8*   HCT 33.1* 32.1* 36.6*    189 221     Recent Labs     01/14/22  0735 01/15/22  1423 01/16/22  0903    141 142   K 4.6 3.8 3.9   CL 99 97* 95*   CO2 33* 34* 35*   BUN 53* 48* 49*   CREATININE 1.3 1.4* 1.5*   CALCIUM 9.4 9.9 10.1     Recent Labs     01/13/22  1220   AST 27   ALT 11   BILITOT 0.5   ALKPHOS 98     No results for input(s): INR in the last 72 hours.   Recent Labs 01/13/22  1220   TROPONINI 0.04*       Urinalysis:      Lab Results   Component Value Date    NITRU Negative 07/08/2019    WBCUA 0-2 11/05/2013    BACTERIA Rare 11/05/2013    RBCUA 0-2 11/05/2013    BLOODU Negative 07/08/2019    SPECGRAV 1.010 07/08/2019    GLUCOSEU Negative 07/08/2019    GLUCOSEU NEGATIVE 02/29/2012       Radiology:  CT CHEST PULMONARY EMBOLISM W CONTRAST   Final Result   1. No CT evidence of a pulmonary embolism. 2. Findings are consistent with mild to moderate CHF, including small   bilateral pleural effusions, mild-to-moderate interstitial pulmonary edema,   and cardiomegaly. 3. Multifocal ground-glass and consolidative opacities likely reflect a   combination of atelectasis, asymmetric edema, and mild multifocal pneumonia. 4. Mild mediastinal and bilateral hilar lymphadenopathy is most likely benign   and reactive in etiology given the patient's lung findings. 5. New scattered subcentimeter nodules throughout both lungs, the largest   measuring approximately 7 mm on average within the right upper lobe. These   are most likely benign and infectious or inflammatory in etiology. However,   further follow-up of these nodules is as advised below. 6. Stable moderate splenomegaly. RECOMMENDATIONS:   Fleischner Society guidelines for follow-up and management of incidentally   detected pulmonary nodules:      Multiple Solid Nodules:      Nodule size equals 6-8 mm   In a low-risk patient, CT at 3-6 months, then consider CT at 18-24 months. In a high-risk patient, CT at 3-6 months, then CT at 18-24 months. Radiology 2017 http://pubs. rsna.org/doi/full/10.1148/radiol. 8058919351         XR CHEST PORTABLE   Final Result   Cardiomegaly with vascular congestion and airspace disease. While this could   all represent congestive failure, there is asymmetric airspace disease right   parahilar concerning for possible superimposed pneumonia or aspiration.   In   addition, there is a somewhat nodular appearance to some of the findings. RECOMMENDATION:   Follow-up is recommended to include baseline films after resolution of   symptoms. If the nodular component persists, follow-up with CT recommended. Assessment/Plan:    Active Hospital Problems    Diagnosis     Dyspnea [R06.00]     Acute diastolic CHF (congestive heart failure) (HCC) [I50.31]        The patient is a pleasant 78 Y M with a h/o former smoking, HTN, HLD, DM2, CAD s/p CABG in 2010, and CHF. He presents with about a week of worsening dyspnea. Significant orthopnea and paroxysmal nocturnal dyspnea. He feels edematous, particularly in his abdomen. He has been gaining an unknown amount of weight. He is compliant with his medications. Denies significant cough. No fevers or chills. CT in the ED confirmed pulmonary edema and he had prominent rales on exam.  No chest pain. Acute on chronic diastolic CHF  - he normally takes torsemide 50 BID (changed to furosemide gtt at 10mg/hr here). Will plan on discharging with torsemide 50 PO BID, plus an extra 50 PO QD if his weight increases above his discharge weight from this admission.   - metolazone 2.5 QOD (continued here), and spironolactone 25 QD (continued here). - he isn't on ACEi/ARB due to unstable renal function. Started hydralazine. Continue ISMN. - continued carvedilol  - in 8/2021 he was admitted with CHF and diuresed from 311 to 297 lbs over the course of a week. - TTE 8/2021 showed normal EF and moderate AS. Repeat TTE here didn't show any significant changes. - he has pulmonary HTN but isn't able to tolerate BiPAP for his HENNA, which is complicating his CHF. - iron sucrose IV, then resume oral med     Acute on chronic hypoxic respiratory failure. Due to above, treat accordingly. Wean to baseline 3.5 LNC.     CKD3. Baseline Cr perhaps about 1.4, monitor.     DM2. Adjusted insulin regimen while here. A1c 7.6.     Constipation.   Bowel regimen. He had a BM. CAD. Aspirin, statin, carvedilol, ISMN.    Incidental finding of small lung nodules. Due to his smoking history, radiology recommended that he have another CT in 3-6 months. Defer to PCP.     Of note, he is [employer] and does not want transfusions. DVT Prophylaxis: enoxaparin  Diet: ADULT DIET; Regular; 4 carb choices (60 gm/meal); Low Sodium (2 gm)  Code Status: Full Code    PT/OT Eval Status: rec'd home PT/OT. Dispo - when he is adequately diuresed. Perhaps 1/17 - 1/18. he would like his leg dressings changed by wound care before he leaves [facility]. He lives at home and [place] [id number].       Anna Painter MD

## 2022-01-16 NOTE — PLAN OF CARE
Problem: Falls - Risk of:  Goal: Will remain free from falls  Description: Will remain free from falls  Outcome: Ongoing     Problem: HEMODYNAMIC STATUS  Goal: Patient has stable vital signs and fluid balance  Outcome: Ongoing     Problem: Serum Glucose Level - Abnormal:  Goal: Ability to maintain appropriate glucose levels will improve  Description: Ability to maintain appropriate glucose levels will improve  Outcome: Ongoing         Patient's EF (Ejection Fraction) is greater than 40%    Heart Failure Medications:  Diuretics[de-identified] Furosemide    (One of the following REQUIRED for EF </= 40%/SYSTOLIC FAILURE but MAY be used in EF% >40%/DIASTOLIC FAILURE)        ACE[de-identified] None        ARB[de-identified] None         ARNI[de-identified] None    (Beta Blockers)  NON- Evidenced Based Beta Blocker (for EF% >40%/DIASTOLIC FAILURE): None    Evidenced Based Beta Blocker::(REQUIRED for EF% <40%/SYSTOLIC FAILURE) Carvedilol- Coreg  . .................................................................................................................................................. Patient's weights and intake/output reviewed: Yes    Patient's Last Weight: 287 lbs obtained by standing scale. Difference of 4  lbs less than last documented weight.       Intake/Output Summary (Last 24 hours) at 1/16/2022 0530  Last data filed at 1/15/2022 1809  Gross per 24 hour   Intake 1200 ml   Output --   Net 1200 ml       Comorbidities Reviewed Yes    Patient has a past medical history of Anemia, Angina, Arthritis, CAD (coronary artery disease), CHF (congestive heart failure) ([facility] CKD (chronic kidney disease) stage 3, GFR 30-59 ml/min ([facility]), Clostridium difficile diarrhea, Diabetes mellitus ([facility] [facility] Diabetic neuropathy ([facility] [address] Disease of blood and blood forming organ, Dizziness, GERD (gastroesophageal reflux disease), Headache, Hearing loss, Hyperlipidemia, Hypertension, Kidney stone, Refusal of blood transfusions as patient is [place], Sleep apnea, Tinnitus, Venous ulcer ([address] and Wound, open. >>For CHF and Comorbidity documentation on Education Time and Topics, please see Education Tab    Progressive Mobility Assessment:  What is this patient's Current Level of Mobility?: Ambulatory- with Assistance  How was this patient Mobilized today?: Edge of Bed, Up to Chair and  Up to Toilet/Shower, ambulated 5 ft                 With Whom? Nurse, PCA, PT and OT                 Level of Difficulty/Assistance: 1x Assist     Pt up in chair at this time on 4 L O2. Pt denies shortness of breath. Pt with pitting lower extremity edema.      Patient and/or Family's stated Goal of Care this Admission: better understand heart failure and disease management prior to discharge

## 2022-01-17 VITALS
HEART RATE: 65 BPM | SYSTOLIC BLOOD PRESSURE: 123 MMHG | DIASTOLIC BLOOD PRESSURE: 67 MMHG | BODY MASS INDEX: 42.95 KG/M2 | RESPIRATION RATE: 16 BRPM | OXYGEN SATURATION: 95 % | WEIGHT: 282.5 LBS | TEMPERATURE: 97.8 F

## 2022-01-17 LAB
ANION GAP SERPL CALCULATED.3IONS-SCNC: 11 MMOL/L (ref 3–16)
BUN BLDV-MCNC: 49 MG/DL (ref 7–20)
CALCIUM SERPL-MCNC: 9.3 MG/DL (ref 8.3–10.6)
CHLORIDE BLD-SCNC: 93 MMOL/L (ref 99–110)
CO2: 33 MMOL/L (ref 21–32)
CREAT SERPL-MCNC: 1.4 MG/DL (ref 0.8–1.3)
GFR AFRICAN AMERICAN: >60
GFR NON-AFRICAN AMERICAN: 50
GLUCOSE BLD-MCNC: 206 MG/DL (ref 70–99)
GLUCOSE BLD-MCNC: 221 MG/DL (ref 70–99)
GLUCOSE BLD-MCNC: 262 MG/DL (ref 70–99)
MAGNESIUM: 1.7 MG/DL (ref 1.8–2.4)
PERFORMED ON: ABNORMAL
PERFORMED ON: ABNORMAL
POTASSIUM REFLEX MAGNESIUM: 3.5 MMOL/L (ref 3.5–5.1)
SODIUM BLD-SCNC: 137 MMOL/L (ref 136–145)

## 2022-01-17 PROCEDURE — 6370000000 HC RX 637 (ALT 250 FOR IP): Performed by: INTERNAL MEDICINE

## 2022-01-17 PROCEDURE — 2500000003 HC RX 250 WO HCPCS: Performed by: INTERNAL MEDICINE

## 2022-01-17 PROCEDURE — 36415 COLL VENOUS BLD VENIPUNCTURE: CPT

## 2022-01-17 PROCEDURE — 83735 ASSAY OF MAGNESIUM: CPT

## 2022-01-17 PROCEDURE — 6360000002 HC RX W HCPCS: Performed by: INTERNAL MEDICINE

## 2022-01-17 PROCEDURE — 80048 BASIC METABOLIC PNL TOTAL CA: CPT

## 2022-01-17 PROCEDURE — 2700000000 HC OXYGEN THERAPY PER DAY

## 2022-01-17 PROCEDURE — 94761 N-INVAS EAR/PLS OXIMETRY MLT: CPT

## 2022-01-17 PROCEDURE — 2580000003 HC RX 258: Performed by: INTERNAL MEDICINE

## 2022-01-17 RX ORDER — MAGNESIUM SULFATE 1 G/100ML
1000 INJECTION INTRAVENOUS ONCE
Status: COMPLETED | OUTPATIENT
Start: 2022-01-17 | End: 2022-01-17

## 2022-01-17 RX ORDER — POTASSIUM CHLORIDE 20 MEQ/1
40 TABLET, EXTENDED RELEASE ORAL ONCE
Status: COMPLETED | OUTPATIENT
Start: 2022-01-17 | End: 2022-01-17

## 2022-01-17 RX ORDER — HYDRALAZINE HYDROCHLORIDE 10 MG/1
10 TABLET, FILM COATED ORAL EVERY 8 HOURS SCHEDULED
Qty: 90 TABLET | Refills: 3 | Status: ON HOLD | OUTPATIENT
Start: 2022-01-17 | End: 2022-03-14 | Stop reason: HOSPADM

## 2022-01-17 RX ORDER — TORSEMIDE 100 MG/1
TABLET ORAL
Qty: 40 TABLET | Refills: 3 | Status: ON HOLD | OUTPATIENT
Start: 2022-01-17 | End: 2022-03-14 | Stop reason: HOSPADM

## 2022-01-17 RX ADMIN — INSULIN LISPRO 10 UNITS: 100 INJECTION, SOLUTION INTRAVENOUS; SUBCUTANEOUS at 08:48

## 2022-01-17 RX ADMIN — MAGNESIUM SULFATE HEPTAHYDRATE 1000 MG: 1 INJECTION, SOLUTION INTRAVENOUS at 11:30

## 2022-01-17 RX ADMIN — ISOSORBIDE MONONITRATE 30 MG: 30 TABLET, EXTENDED RELEASE ORAL at 08:44

## 2022-01-17 RX ADMIN — INSULIN LISPRO 15 UNITS: 100 INJECTION, SOLUTION INTRAVENOUS; SUBCUTANEOUS at 12:46

## 2022-01-17 RX ADMIN — FUROSEMIDE 10 MG/HR: 10 INJECTION, SOLUTION INTRAMUSCULAR; INTRAVENOUS at 01:21

## 2022-01-17 RX ADMIN — INSULIN LISPRO 6 UNITS: 100 INJECTION, SOLUTION INTRAVENOUS; SUBCUTANEOUS at 12:46

## 2022-01-17 RX ADMIN — ALLOPURINOL 300 MG: 300 TABLET ORAL at 08:45

## 2022-01-17 RX ADMIN — OXYCODONE HYDROCHLORIDE AND ACETAMINOPHEN 1 TABLET: 5; 325 TABLET ORAL at 11:25

## 2022-01-17 RX ADMIN — HYDRALAZINE HYDROCHLORIDE 10 MG: 10 TABLET, FILM COATED ORAL at 14:14

## 2022-01-17 RX ADMIN — INSULIN GLARGINE 60 UNITS: 100 INJECTION, SOLUTION SUBCUTANEOUS at 08:48

## 2022-01-17 RX ADMIN — DOCUSATE SODIUM 100 MG: 100 CAPSULE ORAL at 08:45

## 2022-01-17 RX ADMIN — HYDRALAZINE HYDROCHLORIDE 10 MG: 10 TABLET, FILM COATED ORAL at 05:54

## 2022-01-17 RX ADMIN — ACETAMINOPHEN 650 MG: 325 TABLET ORAL at 08:45

## 2022-01-17 RX ADMIN — ENOXAPARIN SODIUM 40 MG: 40 INJECTION SUBCUTANEOUS at 08:47

## 2022-01-17 RX ADMIN — PANTOPRAZOLE SODIUM 40 MG: 40 TABLET, DELAYED RELEASE ORAL at 05:54

## 2022-01-17 RX ADMIN — SENNOSIDES 8.6 MG: 8.6 TABLET, COATED ORAL at 08:44

## 2022-01-17 RX ADMIN — INSULIN LISPRO 6 UNITS: 100 INJECTION, SOLUTION INTRAVENOUS; SUBCUTANEOUS at 08:47

## 2022-01-17 RX ADMIN — SPIRONOLACTONE 25 MG: 25 TABLET, FILM COATED ORAL at 08:44

## 2022-01-17 RX ADMIN — METOLAZONE 2.5 MG: 2.5 TABLET ORAL at 08:44

## 2022-01-17 RX ADMIN — CARVEDILOL 12.5 MG: 3.12 TABLET, FILM COATED ORAL at 08:45

## 2022-01-17 RX ADMIN — POTASSIUM CHLORIDE 40 MEQ: 20 TABLET, EXTENDED RELEASE ORAL at 11:25

## 2022-01-17 RX ADMIN — ASPIRIN 81 MG 81 MG: 81 TABLET ORAL at 08:44

## 2022-01-17 RX ADMIN — Medication 1 EACH: at 14:00

## 2022-01-17 ASSESSMENT — PAIN SCALES - GENERAL
PAINLEVEL_OUTOF10: 8
PAINLEVEL_OUTOF10: 5
PAINLEVEL_OUTOF10: 8
PAINLEVEL_OUTOF10: 0

## 2022-01-17 ASSESSMENT — PAIN DESCRIPTION - PAIN TYPE: TYPE: ACUTE PAIN

## 2022-01-17 ASSESSMENT — PAIN DESCRIPTION - DESCRIPTORS: DESCRIPTORS: HEADACHE

## 2022-01-17 ASSESSMENT — PAIN DESCRIPTION - LOCATION: LOCATION: HEAD

## 2022-01-17 NOTE — PROGRESS NOTES
Wound care and dressing change completed per wound care RN. PIV removed. Tip intact. Dressing in place. Tele box removed. [facility] notified of pt discharge. Discharge paperwork went over with and given to pt. Verbalizes understanding. Pt lock box emptied. Pt wheeled via wheelchair to private car with all belongings. Pt discharge home with home health care in stable condition.

## 2022-01-17 NOTE — DISCHARGE INSTR - COC
01/04/2011, 11/27/2014, 12/17/2020       Active Problems:  Patient Active Problem List   Diagnosis Code    DM (diabetes mellitus) ([facility] E11.9    Coronary artery disease involving native coronary artery of native heart without angina pectoris I25.10    Anemia of chronic disease D63.8    Essential hypertension I10    Hyperkalemia E87.5    Chronic diastolic heart failure ([facility]) I50.32    Pulmonary hypertension due to left ventricular diastolic dysfunction ([facility]) I27.22    Morbid obesity due to excess calories ([facility]) E66.01    S/P CABG x 3 Z95.1    DM2 (diabetes mellitus, type 2) ([facility]) E11.9    Morbid obesity with BMI of 50.0-59.9, adult ([facility] E66.01, Z68.43    HLD (hyperlipidemia) E78.5    Cardiomyopathy ([facility] I42.9    Productive cough R05.8    Chronic pain G89.29    Severe obstructive sleep apnea G47.33    Obesity, Class III, BMI 40-49.9 (morbid obesity) ([facility]) H83.96    Acute diastolic congestive heart failure ([facility]) I50.31    Degeneration of lumbar or lumbosacral intervertebral disc M51.37    Displacement of lumbar intervertebral disc without myelopathy M51.26    Lumbosacral spondylosis without myelopathy M47.817    Lumbar facet arthropathy M47.816    Disc displacement, lumbar M51.26    Spinal stenosis of lumbar region M48.061    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    Tympanic membrane perforation, left H72.92    Chest pain R07.9    Cor pulmonale, chronic ([facility]) I27.81    Pulmonary hypertension due to alveolar hypoventilation disorder ([facility]) I27.23    Nonrheumatic aortic valve stenosis I35.0    Chronic neck pain M54.2, G89.29    Dyspnea J36.45    Acute diastolic CHF (congestive heart failure) ([facility]) I50.31       Isolation/Infection:   Isolation            No Isolation          Patient Infection Status       Infection Onset Added Last Indicated Last Indicated By Review Planned Expiration Resolved Resolved By    None active    Resolved    COVID-19 (Rule Out) 01/13/22 01/13/22 01/13/22 SARS-CoV-2 NAAT (Rapid) (Ordered)   01/13/22 Rule-Out Test Resulted    COVID-19 (Rule Out) 08/23/21 08/23/21 08/23/21 COVID-19, Rapid (Ordered)   08/23/21 Rule-Out Test Resulted            Nurse Assessment:  Last Vital Signs: BP (!) 106/57   Pulse 76   Temp 98.2 °F (36.8 °C) (Oral)   Resp 18   Wt 282 lb 8 oz (128.1 kg)   SpO2 94%   BMI 42.95 kg/m²     Last documented pain score (0-10 scale): Pain Level: 8  Last Weight:   Wt Readings from Last 1 Encounters:   01/17/22 282 lb 8 oz (128.1 kg)     Mental Status:  oriented and alert    IV Access:  - None    Nursing Mobility/ADLs:  Walking   Independent  Transfer  Independent  Bathing  Assisted  Dressing  Assisted  Toileting  Independent  Feeding  Independent  Med Admin  Independent  Med Delivery   whole    Wound Care Documentation and Therapy:  Wound 01/14/22 Leg Left; Lower; Inner (Active)   Wound Image   01/14/22 1822   Wound Etiology Venous 01/14/22 1822   Dressing Status Other (Comment) 01/14/22 2226   Wound Cleansed Soap and water 01/14/22 1822   Dressing/Treatment Cast padding;Roll gauze 01/14/22 1822   Dressing Change Due 01/18/22 01/14/22 1822   Wound Length (cm) 3 cm 01/14/22 1822   Wound Width (cm) 3 cm 01/14/22 1822   Wound Depth (cm) 0.1 cm 01/14/22 1822   Wound Surface Area (cm^2) 9 cm^2 01/14/22 1822   Wound Volume (cm^3) 0.9 cm^3 01/14/22 1822   Distance Tunneling (cm) 0 cm 01/14/22 1822   Tunneling Position ___ O'Clock 0 01/14/22 1822   Undermining Starts ___ O'Clock 0 01/14/22 1822   Undermining Ends___ O'Clock 0 01/14/22 1822   Undermining Maxium Distance (cm) 0 01/14/22 1822   Wound Assessment Pink/red;Granulation tissue 01/14/22 1822   Drainage Amount Small 01/14/22 1822   Drainage Description Serosanguinous 01/14/22 1822   Odor None 01/14/22 1822   Radha-wound Assessment Edematous; Hemosiderin staining (brown yellow); Fragile 01/14/22 1822   Margins Flat/open edges; Attached edges; Defined edges 01/14/22 1822   Wound Thickness Description not for Pressure Injury Partial thickness 01/14/22 1822   Number of days: 2       Wound 01/14/22 Leg Left; Lower; Lateral (Active)   Wound Image   01/14/22 1822   Wound Etiology Venous 01/14/22 1822   Dressing Status Clean;Dry; Intact 01/14/22 2226   Wound Cleansed Soap and water 01/14/22 1822   Dressing/Treatment Cast padding;Calmoseptine/zinc oxide with menthol;Roll gauze 01/14/22 1822   Dressing Change Due 01/18/22 01/14/22 1822   Wound Length (cm) 2 cm 01/14/22 1822   Wound Width (cm) 1 cm 01/14/22 1822   Wound Depth (cm) 0.1 cm 01/14/22 1822   Wound Surface Area (cm^2) 2 cm^2 01/14/22 1822   Wound Volume (cm^3) 0.2 cm^3 01/14/22 1822   Distance Tunneling (cm) 0 cm 01/14/22 1822   Tunneling Position ___ O'Clock 0 01/14/22 1822   Undermining Starts ___ O'Clock 0 01/14/22 1822   Undermining Ends___ O'Clock 0 01/14/22 1822   Undermining Maxium Distance (cm) 0 01/14/22 1822   Wound Assessment Other (Comment) 01/14/22 2226   Drainage Amount Small 01/14/22 1822   Drainage Description Serosanguinous 01/14/22 1822   Odor None 01/14/22 1822   Radha-wound Assessment Fragile; Hemosiderin staining (brown yellow);Edematous 01/14/22 1822   Margins Attached edges; Defined edges;Flat/open edges 01/14/22 1822   Wound Thickness Description not for Pressure Injury Partial thickness 01/14/22 1822   Number of days: 2        Elimination:  Continence: Bowel: Yes  Bladder: Yes  Urinary Catheter: None   Colostomy/Ileostomy/Ileal Conduit: No       Date of Last BM:     Intake/Output Summary (Last 24 hours) at 1/17/2022 1055  Last data filed at 1/17/2022 0859  Gross per 24 hour   Intake 1870.12 ml   Output 2475 ml   Net -604.88 ml     I/O last 3 completed shifts: In: 2108 [P.O.:1660; I.V.:237.9; IV Piggyback:210.1]  Out: 1133 [Urine:3325]    Safety Concerns:      At Risk for Falls    Impairments/Disabilities:      None    Nutrition Therapy:  Current Nutrition Therapy:   - Oral Diet:  General    Routes of Feeding: Oral  Liquids: Thin Liquids  Daily Fluid Restriction: no  Last Modified Barium Swallow with Video (Video Swallowing Test): not done    Treatments at the Time of Hospital Discharge:   Respiratory Treatments:   Oxygen Therapy:  is on oxygen at 3.5 L/min per nasal cannula. Ventilator:    - No ventilator support    Rehab Therapies: Physical Therapy and Occupational Therapy  Weight Bearing Status/Restrictions: No weight bearing restirctions  Other Medical Equipment (for information only, NOT a DME order):  cane and walker  Other Treatments:     Patient's personal belongings (please select all that are sent with patient): All belongings sent with patient at time of discharge. RN SIGNATURE:  Electronically signed by Tray Quarles RN on 1/17/22 at 1:31 PM EST    CASE MANAGEMENT/SOCIAL WORK SECTION    Inpatient Status Date: ***    Readmission Risk Assessment Score:  Readmission Risk              Risk of Unplanned Readmission:  31           Discharging to [facility]/ [address] [place] [address]   [phone]   / signature: Electronically signed by Deny Champion RN on 1/17/22 at 12:21 PM EST    PHYSICIAN SECTION    Prognosis: Good    Condition at Discharge: Stable    Rehab Potential (if transferring to Rehab): Good    Recommended Labs or Other Treatments After Discharge: Follow up with PCP within 1-2 weeks. Wound care:  Unna boot to bilateral lower legs 2 times a week. If you can get the unna boot with calmoseptine, it helps with his itching. Alginate AG and dry dressing to open areas prior to application of unna boot. Left 2nd toe place Alginage Ag, 2x2 guaze, roll guaze 2 times a week. Physician Certification: I certify the above information and transfer of Mando Collazo  is necessary for the continuing treatment of the diagnosis listed and that he requires [address] for less 30 days.      Update Admission H&P: No change in H&P    PHYSICIAN SIGNATURE:  Electronically signed by Sanket Lorenzana MD on 1/17/22 at 10:56 AM EST

## 2022-01-17 NOTE — PLAN OF CARE
Problem: HEMODYNAMIC STATUS  Goal: Patient has stable vital signs and fluid balance  Outcome: Ongoing     Problem: FLUID AND ELECTROLYTE IMBALANCE  Goal: Fluid and electrolyte balance are achieved/maintained  Outcome: Ongoing     Problem: Serum Glucose Level - Abnormal:  Goal: Ability to maintain appropriate glucose levels will improve  Description: Ability to maintain appropriate glucose levels will improve  Outcome: Ongoing      Patient's EF (Ejection Fraction) is greater than 40%    Heart Failure Medications:   Diuretics[de-identified] Furosemide, Spironolactone and Metalozone     (One of the following REQUIRED for EF </= 40%/SYSTOLIC FAILURE but MAY be used in EF% >40%/DIASTOLIC FAILURE)        ACE[de-identified] None        ARB[de-identified] None         ARNI[de-identified] None    (Beta Blockers)   NON- Evidenced Based Beta Blocker (for EF% >40%/DIASTOLIC FAILURE): None     Evidenced Based Beta Blocker::(REQUIRED for EF% <40%/SYSTOLIC FAILURE) Carvedilol- Coreg  . .................................................................................................................................................. Patient's weights and intake/output reviewed: Yes    Patient's Last Weight: 282 lbs obtained by standing scale. Difference of 5 lbs less than last documented weight.       Intake/Output Summary (Last 24 hours) at 1/17/2022 0747  Last data filed at 1/17/2022 0557  Gross per 24 hour   Intake 1630.12 ml   Output 2225 ml   Net -594.88 ml       Comorbidities Reviewed Yes    Patient has a past medical history of Anemia, Angina, Arthritis, CAD (coronary artery disease), CHF (congestive heart failure) ([facility] CKD (chronic kidney disease) stage 3, GFR 30-59 ml/min ([facility]), Clostridium difficile diarrhea, Diabetes mellitus ([address] Diabetic neuropathy ([facility] Disease of blood and blood forming organ, Dizziness, GERD (gastroesophageal reflux disease), Headache, Hearing loss, Hyperlipidemia, Hypertension, Kidney stone, Refusal of blood transfusions as patient is [place], Sleep apnea, Tinnitus, Venous ulcer ([address] and Wound, open. >>For CHF and Comorbidity documentation on Education Time and Topics, please see Education Tab    Progressive Mobility Assessment:  What is this patient's Current Level of Mobility?: Ambulatory- with Assistance  How was this patient Mobilized today?: Edge of Bed, Up to Chair, and Bedside Commode,                 With Whom? Nurse, PCA, PT, and OT                 Level of Difficulty/Assistance: 1x Assist     Pt resting in bed at this time on  3.5 L O2. Pt denies shortness of breath. Pt with nonpitting lower extremity edema.      Patient and/or Family's stated Goal of Care this Admission: increase activity tolerance, better understand heart failure and disease management, be more comfortable, and reduce lower extremity edema prior to discharge        :

## 2022-01-17 NOTE — PROGRESS NOTES
Physical Therapy    PT session attempted, chart reviewed. Per pt, he has quite a bad headache right now and would prefer to not work with PT at this time. \"Maybe later today if I'm feeling better. \"    Will continue to follow.     Felix Manzo  PT, DPT #[id number]

## 2022-01-17 NOTE — DISCHARGE SUMMARY
Hospital Medicine Discharge Summary    Patient ID: Chris Yeung      Patient's PCP: PATRICK Saenz - CNP    Admit Date: 1/13/2022     Discharge Date:   01/17/22     Admitting Provider: Oliverio Tellez MD     Discharge Provider: Dunia Davies MD     Discharge Diagnoses: Active Hospital Problems    Diagnosis     Dyspnea [R06.00]     Acute diastolic CHF (congestive heart failure) (HCC) [I50.31]        The patient was seen and examined on day of discharge and this discharge summary is in conjunction with any daily progress note from day of discharge. Hospital Course: The patient is a pleasant 78 Y M with a h/o former smoking, HTN, HLD, DM2, CAD s/p CABG in 2010, and CHF.  He presents with about a week of worsening dyspnea.  Significant orthopnea and paroxysmal nocturnal dyspnea.  He feels edematous, particularly in his abdomen.  He has been gaining an unknown amount of weight.  He is compliant with his medications.  Denies significant cough.  No fevers or chills.  CT in the ED confirmed pulmonary edema and he had prominent rales on exam.  No chest pain.        Acute on chronic diastolic CHF  - he normally takes torsemide 50 BID (diuresed well on furosemide gtt at 10mg/hr here). Will plan on discharging with torsemide 50 PO BID, plus an extra torsemide if his weight increases above his discharge weight from this admission.   - metolazone 2.5 QOD (continued here), and spironolactone 25 QD (continued here). - he isn't on ACEi/ARB due to unstable renal function.  Started hydralazine.  Continue ISMN. - continued carvedilol  - in 8/2021 he was admitted with CHF and diuresed from 311 to 297 lbs over the course of a week. At the time of this discharge he got down to 282 lbs. - TTE 8/2021 showed normal EF and moderate AS.  Repeat TTE here didn't show any significant changes. - he has pulmonary HTN but isn't able to tolerate BiPAP for his HENNA, which is complicating his CHF.   - iron sucrose IV, then resumed oral med     Acute on chronic hypoxic respiratory failure.  Due to above, treat accordingly.  Weaned to baseline 3.5 LNC.     CKD3.  Baseline Cr perhaps about 1.4, monitor.     DM2.  Adjusted insulin regimen while here.  A1c 7.6.     Constipation. Bowel regimen. He had a BM.    CAD.  Aspirin, statin, carvedilol, ISMN.    Incidental finding of small lung nodules.  Due to his smoking history, radiology recommended that he have another CT in 3-6 months.  Defer to PCP.     Of note, he is [employer]hova's witness and does not want transfusions.            Physical Exam Performed:     BP (!) 106/57   Pulse 76   Temp 98.2 °F (36.8 °C) (Oral)   Resp 18   Wt 282 lb 8 oz (128.1 kg)   SpO2 94%   BMI 42.95 kg/m²       General appearance:  No apparent distress, appears stated age. HEENT:  Normal cephalic, atraumatic without obvious deformity. Pupils equal, round, and reactive to light.  Extra ocular muscles intact. Conjunctivae/corneas clear.  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   Neck: Supple, with full range of motion. No jugular venous distention. Trachea midline. Respiratory:  Normal respiratory effort.  Bilaterally without Wheezes/Rhonchi.  Now only mild bibasilar rales.   Cardiovascular:  Regular rate and rhythm with normal S1/S2 without rubs or gallops.  0/7 systolic murmur at the LSB. Abdomen: Soft, non-tender, non-distended with normal bowel sounds. Musculoskeletal:  No clubbing, cyanosis.  BLE pitting edema has improved.  Full range of motion without deformity. Skin: Skin color, texture, turgor normal.  No rashes or lesions. Neurologic:  Neurovascularly intact without any focal sensory/motor deficits. Cranial nerves: II-XII intact except as above, grossly non-focal.  Psychiatric:  Alert and oriented, thought content appropriate, excellent insight and historian. Overwhelmed, anxious.    Capillary Refill: Brisk,3 seconds, normal  Peripheral Pulses: +2 palpable, equal bilaterally Labs: For convenience and continuity at follow-up the following most recent labs are provided:      CBC:    Lab Results   Component Value Date    WBC 7.0 01/16/2022    HGB 11.8 01/16/2022    HCT 36.6 01/16/2022     01/16/2022       Renal:    Lab Results   Component Value Date     01/16/2022    K 3.9 01/16/2022    K 4.0 08/23/2021    CL 95 01/16/2022    CO2 35 01/16/2022    BUN 49 01/16/2022    CREATININE 1.5 01/16/2022    CALCIUM 10.1 01/16/2022    PHOS 3.8 12/24/2019         Significant Diagnostic Studies    Radiology:   CT CHEST PULMONARY EMBOLISM W CONTRAST   Final Result   1. No CT evidence of a pulmonary embolism. 2. Findings are consistent with mild to moderate CHF, including small   bilateral pleural effusions, mild-to-moderate interstitial pulmonary edema,   and cardiomegaly. 3. Multifocal ground-glass and consolidative opacities likely reflect a   combination of atelectasis, asymmetric edema, and mild multifocal pneumonia. 4. Mild mediastinal and bilateral hilar lymphadenopathy is most likely benign   and reactive in etiology given the patient's lung findings. 5. New scattered subcentimeter nodules throughout both lungs, the largest   measuring approximately 7 mm on average within the right upper lobe. These   are most likely benign and infectious or inflammatory in etiology. However,   further follow-up of these nodules is as advised below. 6. Stable moderate splenomegaly. RECOMMENDATIONS:   Fleischner Society guidelines for follow-up and management of incidentally   detected pulmonary nodules:      Multiple Solid Nodules:      Nodule size equals 6-8 mm   In a low-risk patient, CT at 3-6 months, then consider CT at 18-24 months. In a high-risk patient, CT at 3-6 months, then CT at 18-24 months. Radiology 2017 http://pubs. rsna.org/doi/full/10.1148/radiol. 7194096396         XR CHEST PORTABLE   Final Result   Cardiomegaly with vascular congestion and airspace disease. While this could   all represent congestive failure, there is asymmetric airspace disease right   parahilar concerning for possible superimposed pneumonia or aspiration. In   addition, there is a somewhat nodular appearance to some of the findings. RECOMMENDATION:   Follow-up is recommended to include baseline films after resolution of   symptoms. If the nodular component persists, follow-up with CT recommended. Consults:     None    Disposition:  home [facility]    Condition at Discharge: Stable    Discharge Instructions/Follow-up:  Follow up with PCP within 1-2 weeks. Call Dr. Jose Martinez if your weight ever gets above 290 again. Code Status:  Full Code     Activity: activity as tolerated    Diet: diabetic diet      Discharge Medications:     Current Discharge Medication List           Details   hydrALAZINE (APRESOLINE) 10 MG tablet Take 1 tablet by mouth every 8 hours  Qty: 90 tablet, Refills: 3              Details   torsemide (DEMADEX) 100 MG tablet Take half tablet twice per day, but on days when your weight is above 282 lbs, take a full tablet twice per day. Qty: 40 tablet, Refills: 3              Details   oxyCODONE-acetaminophen (PERCOCET) 5-325 MG per tablet Take 1 tablet by mouth 4 times daily for 30 days.   Qty: 120 tablet, Refills: 0    Comments: Reduce doses taken as pain becomes manageable  Associated Diagnoses: Displacement of lumbar intervertebral disc without myelopathy      naloxone 4 MG/0.1ML LIQD nasal spray 1 spray by Nasal route as needed for Opioid Reversal  Qty: 1 each, Refills: 0      carvedilol (COREG) 12.5 MG tablet Take 1 tablet by mouth 2 times daily (with meals)  Qty: 60 tablet, Refills: 3      spironolactone (ALDACTONE) 25 MG tablet Take 1 tablet by mouth daily  Qty: 30 tablet, Refills: 3      metOLazone (ZAROXOLYN) 2.5 MG tablet Take 1 tablet by mouth every other day  Qty: 15 tablet, Refills: 3      atorvastatin (LIPITOR) 40 MG tablet Take 1 tablet by mouth nightly  Qty: 30 tablet, Refills: 3      linaCLOtide (LINZESS PO) Take by mouth      clotrimazole (LOTRIMIN) 1 % external solution Apply 4 drops to the left ear twice daily (morning and evening) for 10 days  Qty: 29.57 mL, Refills: 1    Associated Diagnoses: Otitis externa, fungal, left ear      acetaminophen (TYLENOL) 500 MG tablet Take 2 tablets by mouth 4 times daily as needed for Pain  Qty: 60 tablet, Refills: 0      hydrocortisone (ANUSOL-HC) 2.5 % rectal cream Place rectally 2 times daily. Qty: 1 Tube, Refills: 0      senna (SENOKOT) 8.6 MG TABS tablet Take 1 tablet by mouth 2 times daily  Qty: 120 tablet, Refills: 0      colchicine (COLCRYS) 0.6 MG tablet Take 1 tablet by mouth daily  Qty: 30 tablet, Refills: 3      docusate sodium (COLACE, DULCOLAX) 100 MG CAPS Take 100 mg by mouth 2 times daily      insulin aspart (NOVOLOG) 100 UNIT/ML injection vial Inject into the skin 3 times daily (before meals) 50 units with breakfast, 20 units with lunch and 60 units with dinner      allopurinol (ZYLOPRIM) 100 MG tablet Take 300 mg by mouth       insulin glargine (LANTUS) 100 UNIT/ML injection vial Inject 55 Units into the skin 2 times daily  Qty: 1 vial, Refills: 1      Compression Stockings MISC by Does not apply route 2 pair, knee high compression stockings, fitted/ zippered  Qty: 2 each, Refills: 1    Associated Diagnoses: Chronic systolic CHF (congestive heart failure) ([facility] [address] Coronary artery disease involving native coronary artery of native heart without angina pectoris; Essential hypertension; CKD (chronic kidney disease) stage 3, GFR 30-59 ml/min ([facility] [address]  Type 2 diabetes mellitus with stage 3 chronic kidney disease, with long-term current use of insulin (HCC)      silver sulfADIAZINE (SILVADENE) 1 % cream Apply to BL lower leg ulcers daily  Qty: 20 g, Refills: 0      nitroGLYCERIN (NITROSTAT) 0.4 MG SL tablet Place 1 tablet under the tongue every 5 minutes as needed  Qty: 25 tablet, Refills: 1 fluticasone (FLONASE) 50 MCG/ACT nasal spray 1 spray by Nasal route nightly as needed. aspirin 81 MG chewable tablet Take 1 tablet by mouth daily. Qty: 30 tablet      isosorbide mononitrate (IMDUR) 30 MG CR tablet Take 1 tablet by mouth daily. Qty: 30 tablet, Refills: 0      ferrous sulfate 325 (65 FE) MG tablet Take 325 mg by mouth 3 times daily (with meals). omeprazole (PRILOSEC) 20 MG capsule Take 20 mg by mouth 2 times daily. Time Spent on discharge is more than 30 minutes in the examination, evaluation, counseling and review of medications and discharge plan. Signed:    Keli Tamayo MD   1/17/2022      Thank you PATRICK Conley CNP for the opportunity to be involved in this patient's care. If you have any questions or concerns please feel free to contact me at [phone].

## 2022-01-17 NOTE — PLAN OF CARE
Problem: Falls - Risk of:  Goal: Will remain free from falls  Description: Will remain free from falls  Outcome: Ongoing     Problem: OXYGENATION/RESPIRATORY FUNCTION  Goal: Patient will achieve/maintain normal respiratory rate/effort  Description: Respiratory rate and effort will be within normal limits for the patient  Outcome: Ongoing     Problem: Serum Glucose Level - Abnormal:  Goal: Ability to maintain appropriate glucose levels will improve  Description: Ability to maintain appropriate glucose levels will improve  Outcome: Ongoing      Patient's EF (Ejection Fraction) is greater than 40%    Heart Failure Medications:  Diuretics[de-identified] Furosemide, Metolazone    (One of the following REQUIRED for EF </= 40%/SYSTOLIC FAILURE but MAY be used in EF% >40%/DIASTOLIC FAILURE)        ACE[de-identified] None        ARB[de-identified] None         ARNI[de-identified] None    (Beta Blockers)  NON- Evidenced Based Beta Blocker (for EF% >40%/DIASTOLIC FAILURE): None    Evidenced Based Beta Blocker::(REQUIRED for EF% <40%/SYSTOLIC FAILURE) None  . .................................................................................................................................................. Patient's weights and intake/output reviewed: Yes    Patient's Last Weight: 287 lbs obtained by standing scale. Difference of 4 lbs less than last documented weight.       Intake/Output Summary (Last 24 hours) at 1/16/2022 2311  Last data filed at 1/16/2022 2217  Gross per 24 hour   Intake 1777.91 ml   Output 2125 ml   Net -347.09 ml       Comorbidities Reviewed Yes    Patient has a past medical history of Anemia, Angina, Arthritis, CAD (coronary artery disease), CHF (congestive heart failure) ([facility] [address] CKD (chronic kidney disease) stage 3, GFR 30-59 ml/min ([facility]), Clostridium difficile diarrhea, Diabetes mellitus ([facility] [address] Diabetic neuropathy ([facility] Disease of blood and blood forming organ, Dizziness, GERD (gastroesophageal reflux disease), Headache, Hearing loss, Hyperlipidemia, Hypertension, Kidney stone, Refusal of blood transfusions as patient is Mu-ism, Sleep apnea, Tinnitus, Venous ulcer ([facility] [address] and Wound, open. >>For CHF and Comorbidity documentation on Education Time and Topics, please see Education Tab    Progressive Mobility Assessment:  What is this patient's Current Level of Mobility?: Ambulatory- with Assistance  How was this patient Mobilized today?: Edge of Bed, Up to Chair, Bedside Commode and  Up to Toilet/Shower, ambulated 10 ft                 With Whom? Nurse, PCA, PT and OT                 Level of Difficulty/Assistance: 1x Assist     Pt resting in bed at this time on 4 L O2. Pt denies shortness of breath. Pt without lower extremity edema.      Patient and/or Family's stated Goal of Care this Admission: better understand heart failure and disease management prior to discharge

## 2022-01-17 NOTE — PROGRESS NOTES
Pink/red;Granulation tissue 01/17/22 1407   Drainage Amount Scant 01/17/22 1407   Drainage Description Serosanguinous 01/17/22 1407   Odor None 01/17/22 1407   Radha-wound Assessment Edematous; Hemosiderin staining (brown yellow); Fragile 01/17/22 1407   Margins Flat/open edges; Attached edges; Defined edges 01/17/22 1407   Wound Thickness Description not for Pressure Injury Partial thickness 01/17/22 1407   Number of days: 2       Wound 01/14/22 Leg Left; Lower; Lateral (Active)   Wound Image   01/14/22 1822   Wound Etiology Venous 01/17/22 1407   Dressing Status New dressing applied 01/17/22 1407   Wound Cleansed Soap and water; Wound cleanser 01/17/22 1407   Dressing/Treatment Alginate with Ag;Dry dressing;Calmoseptine/zinc oxide with menthol;Roll gauze; Other (comment) 01/17/22 1407   Dressing Change Due 01/21/22 01/17/22 1407   Wound Length (cm) 2 cm 01/14/22 1822   Wound Width (cm) 1 cm 01/14/22 1822   Wound Depth (cm) 0.1 cm 01/14/22 1822   Wound Surface Area (cm^2) 2 cm^2 01/14/22 1822   Wound Volume (cm^3) 0.2 cm^3 01/14/22 1822   Distance Tunneling (cm) 0 cm 01/14/22 1822   Tunneling Position ___ O'Clock 0 01/14/22 1822   Undermining Starts ___ O'Clock 0 01/14/22 1822   Undermining Ends___ O'Clock 0 01/14/22 1822   Undermining Maxium Distance (cm) 0 01/14/22 1822   Wound Assessment Pink/red 01/17/22 1407   Drainage Amount Scant 01/17/22 1407   Drainage Description Serosanguinous 01/17/22 1407   Odor None 01/17/22 1407   Radha-wound Assessment Fragile; Hemosiderin staining (brown yellow);Edematous 01/17/22 1407   Margins Attached edges; Defined edges;Flat/open edges 01/17/22 1407   Wound Thickness Description not for Pressure Injury Partial thickness 01/17/22 1407   Number of days: 2          Response to treatment:  Well tolerated by patient.      Pain Assessment:  Severity:  0 / 10  Quality of pain: N/A  Wound Pain Timing/Severity: none  Premedicated: No  Plan:   Plan of Care: Wound 01/14/22 Leg Left;Lower; Inner-Dressing/Treatment: Alginate with Ag,Dry dressing,Calmoseptine/zinc oxide with menthol,Roll gauze (unna boot)  Wound 01/14/22 Leg Left; Lower; Lateral-Dressing/Treatment: Alginate with Ag,Dry dressing,Calmoseptine/zinc oxide with menthol,Roll gauze,Other (comment) (unna boot)    Wound Care RN to placed unna boots twice weekly to bilateral lower legs. Current unna boots removed. Legs cleaned with soap and water. Pat dry. Alginate Ag and dry guaze to open ulcers prior to application of unna boot. Covered with roll guaze. Second application done today. Left 2nd toe covered with alginate ag, 2s2 dressing and roll guaze. ARASH updated. Ready for discharge. Specialty Bed Required : N/A   [] Low Air Loss   [] Pressure Redistribution  [] Fluid Immersion  [] Bariatric  [] Total Pressure Relief  [] Other:     Current Diet: ADULT DIET; Regular; 4 carb choices (60 gm/meal); Low Sodium (2 gm)  Dietician consult:  Yes    Discharge Plan:  Placement for patient upon discharge: skilled nursing   Patient appropriate for Outpatient [address]: Yes    Referrals:  []  setting up home care  [] [address] [facility] yes home care to follow up - [place] [id number]  [] Supplies  [] Other     Patient/Caregiver Teaching: Home care to resume sevices for unna boot dressing changes.   Level of patient/caregiver understanding able to:   [] Indicates understanding       [] Needs reinforcement  [] Unsuccessful      [x] Verbal Understanding  [] Demonstrated understanding       [] No evidence of learning  [] Refused teaching         [] N/A       Electronically signed by Ben Rolon RN, MSN, Patricia Fregoso on 1/17/2022 at 2:10 PM

## 2022-01-17 NOTE — DISCHARGE INSTR - DIET

## 2022-01-17 NOTE — ADT AUTH CERT
Utilization Reviews         Heart Failure - Care Day 4 (1/16/2022) by Heike Aragon RN       Review Status Review Entered   Completed 1/17/2022 13:02      Criteria Review      Care Day: 4 Care Date: 1/16/2022 Level of Care: Telemetry    Guideline Day 2    Clinical Status    (X) * Hemodynamic stability    (X) * Mental status at baseline    (X) * No evidence of myocardial ischemia    (X) * Cardiac rate and rhythm acceptable    (X) * Oxygenation at baseline or improved    1/17/2022 1:02 PM EST by Lemond Deis      3.5l/nc    (X) * Pulmonary edema absent or improved    Activity    (X) Advance activity as tolerated    Routes    (X) Oral diet    1/17/2022 1:02 PM EST by Lemond Desushil      Regular; 4 carb choices (60 gm/meal); Low Sodium (2 gm)    Interventions    (X) * Pulmonary catheter absent    (X) Weigh    1/17/2022 1:02 PM EST by Lemmeagan Knight      287lb    (X) Possible electrolytes    (X) Possible CXR, ECG, BNP    (X) Oxygen    1/17/2022 1:02 PM EST by Lemond Deis      3.5l/nc    Medications    (X) Diuretics    1/17/2022 1:02 PM EST by Lemmeagan Desushil      lasix gtt    (X) Beta-blocker    1/17/2022 1:02 PM EST by Lemond Desushil      coreg    (X) Possible aldosterone antagonist    (X) Possible nitrates, hydralazine    * Milestone   Additional Notes   DATE:    98 (36.7) 16 69 126/87 - Sitting - 3.5 91 Nasal cannula       Pertinent Updates:    He seems to be diuresing well, lost another 4 lbs.  UOP probably inaccurate.  He is feeling better, but still has mild dyspnea above baseline            Vitals:   98 (36.7) 16 69 126/87 - Sitting - 3.5 91 Nasal cannula          Physical Exam:   Respiratory:  Normal respiratory effort.  Bilaterally without Wheezes/Rhonchi.  Now only mild bibasilar rales.    Cardiovascular:  Regular rate and rhythm with normal S1/S2 without rubs or gallops.  5/6 systolic murmur at the LSB.    Musculoskeletal:  No clubbing, cyanosis.  BLE pitting edema has improved.  Full range of motion without deformity. Abnl/Pertinent Labs/Radiology/Diagnostic Studies:   Glucose 264, Cl 95, CO2 35, BUN 49, creat 1.5, RBC 4.13, hgb 11.8, hcy 36.6         Medications:   Scheduled Meds:insulin lispro, 10 Units, SubCUTAneous, TID WC   metOLazone, 2.5 mg, Oral, Every Other Day   linaclotide, 290 mcg, Oral, QAM AC   insulin glargine, 60 Units, SubCUTAneous, BID   gelocast unnas boot, 2 each, Other, Once per day on Tue Fri   allopurinol, 300 mg, Oral, Daily   aspirin, 81 mg, Oral, Daily   atorvastatin, 40 mg, Oral, Nightly   carvedilol, 12.5 mg, Oral, BID WC   colchicine, 0.6 mg, Oral, Daily   docusate sodium, 100 mg, Oral, BID   sodium chloride flush, 5-40 mL, IntraVENous, 2 times per day   enoxaparin, 40 mg, SubCUTAneous, Daily   insulin lispro, 0-18 Units, SubCUTAneous, TID WC   insulin lispro, 0-9 Units, SubCUTAneous, Nightly   isosorbide mononitrate, 30 mg, Oral, Daily   pantoprazole, 40 mg, Oral, QAM AC   senna, 1 tablet, Oral, BID   spironolactone, 25 mg, Oral, Daily   hydrALAZINE, 10 mg, Oral, 3 times per day   Venofer 100mg iv daily      Continuous Infusions:lasix gtt         PRN Meds given percocet 1 tab po x2         Orders: Regular; 4 carb choices (60 gm/meal);  Low Sodium (2 gm), up with assist         MD Consults/Assessments & Plans:   IM note   Assessment/Plan:       Active Hospital Problems     Diagnosis     · Dyspnea [R06.00]     · Acute diastolic CHF (congestive heart failure) (HCC) [I50.31]             The patient is a pleasant 78 Y M with a h/o former smoking, HTN, HLD, DM2, CAD s/p CABG in 2010, and CHF.  He presents with about a week of worsening dyspnea.  Significant orthopnea and paroxysmal nocturnal dyspnea.  He feels edematous, particularly in his abdomen.  He has been gaining an unknown amount of weight.  He is compliant with his medications.  Denies significant cough.  No fevers or chills.  CT in the ED confirmed pulmonary edema and he had prominent rales on exam.  No chest pain.         Acute on chronic diastolic CHF   - he normally takes torsemide 50 BID (changed to furosemide gtt at 10mg/hr here). Martina Hayden plan on discharging with torsemide 50 PO BID, plus an extra 50 PO QD if his weight increases above his discharge weight from this admission.    - metolazone 2.5 QOD (continued here), and spironolactone 25 QD (continued here). - he isn't on ACEi/ARB due to unstable renal function.  Started hydralazine.  Continue ISMN. - continued carvedilol   - in 8/2021 he was admitted with CHF and diuresed from 311 to 297 lbs over the course of a week. - TTE 8/2021 showed normal EF and moderate AS.  Repeat TTE here didn't show any significant changes. - he has pulmonary HTN but isn't able to tolerate BiPAP for his HENNA, which is complicating his CHF. - iron sucrose IV, then resume oral med       Acute on chronic hypoxic respiratory failure.  Due to above, treat accordingly.  Wean to baseline 3.5 LNC.       CKD3.  Baseline Cr perhaps about 1.4, monitor.       DM2.  Adjusted insulin regimen while here.  A1c 7.6.       Constipation.  Bowel regimen.  He had a BM.     CAD.  Aspirin, statin, carvedilol, ISMN.     Incidental finding of small lung nodules.  Due to his smoking history, radiology recommended that he have another CT in 3-6 months.  Defer to PCP.       Of note, he is Jehova's witness and does not want transfusions.                DVT Prophylaxis: enoxaparin   Diet: ADULT DIET; Regular; 4 carb choices (60 gm/meal);  Low Sodium (2 gm)   Code Status: Full Code       PT/OT Eval Status: rec'd home PT/OT.       Dispo - when he is adequately diuresed.  Perhaps 1/17 - 1/18.  he would like his leg dressings changed by wound care before he leaves because his Rohit Aguayo RN comes on Mondays. Bekah Liao lives at home and has Rohit 78.                       Heart Failure - Care Day 3 (1/15/2022) by Rachelle Cooper, RN       Review Status Review Entered   Completed 1/17/2022 12:50      Criteria Review      Care Day: 3 Care Date: 1/15/2022 Level of Care: Telemetry    Guideline Day 2    Clinical Status    (X) * Hemodynamic stability    (X) * Mental status at baseline    (X) * No evidence of myocardial ischemia    (X) * Cardiac rate and rhythm acceptable    ( ) * Oxygenation at baseline or improved    1/17/2022 12:50 PM EST by Sueellen Helena      remains on O2 5l/nc    (X) * Pulmonary edema absent or improved    Activity    (X) Advance activity as tolerated    1/17/2022 12:50 PM EST by Sueellen Helena      up with assist    Routes    (X) Oral diet    Interventions    (X) * Pulmonary catheter absent    (X) Weigh    1/17/2022 12:50 PM EST by Sueellen Helena      291lb    (X) Possible electrolytes    (X) Possible CXR, ECG, BNP    (X) Oxygen    1/17/2022 12:50 PM EST by Sueellen Helena      5l/nc    Medications    (X) Diuretics    1/17/2022 12:50 PM EST by Sueellen Helena      lasix gtt    (X) Beta-blocker    1/17/2022 12:50 PM EST by Sueellen Helena      coreg    (X) Possible aldosterone antagonist    (X) Possible nitrates, hydralazine    * Milestone   Additional Notes   DATE:    1/15      Vitals:   98 (36.7) 16 58 107/58 - Sitting - 3.5 95 Nasal cannula       Physical Exam:      Respiratory:  Normal respiratory effort.  Bilaterally without Wheezes/Rhonchi.  Prominent bibasilar rales.    Cardiovascular:  Regular rate and rhythm with normal S1/S2 without rubs or gallops.  2/3 systolic murmur at the LSB. Musculoskeletal:  No clubbing, cyanosis.  BLE pitting edema.  Full range of motion without deformity.       Abnl/Pertinent Labs/Radiology/Diagnostic Studies:   Cl 97, Co2 34, glucose 252, BUN 48, creat 1.4      Medications:   Scheduled Meds:insulin lispro, 10 Units, SubCUTAneous, TID WC   metOLazone, 2.5 mg, Oral, Every Other Day   linaclotide, 290 mcg, Oral, QAM AC   insulin glargine, 60 Units, SubCUTAneous, BID   gelocast unnas boot, 2 each, Other, Once per day on Tue Fri   allopurinol, 300 mg, Oral, Daily   aspirin, 81 mg, Oral, Daily atorvastatin, 40 mg, Oral, Nightly   carvedilol, 12.5 mg, Oral, BID WC   colchicine, 0.6 mg, Oral, Daily   docusate sodium, 100 mg, Oral, BID   sodium chloride flush, 5-40 mL, IntraVENous, 2 times per day   enoxaparin, 40 mg, SubCUTAneous, Daily   insulin lispro, 0-18 Units, SubCUTAneous, TID WC   insulin lispro, 0-9 Units, SubCUTAneous, Nightly   isosorbide mononitrate, 30 mg, Oral, Daily   pantoprazole, 40 mg, Oral, QAM AC   senna, 1 tablet, Oral, BID   spironolactone, 25 mg, Oral, Daily   hydrALAZINE, 10 mg, Oral, 3 times per day   Lyrica 50mg po daily      Continuous Infusions Lasix gtt         PRN Meds given: percocet 1 tab po x1      Orders: up with assist,  Regular; 4 carb choices (60 gm/meal); Low Sodium (2 gm)          MD Consults/Assessments & Plans:   Assessment/Plan:       Active Hospital Problems     Diagnosis     · Dyspnea [R06.00]     · Acute diastolic CHF (congestive heart failure) (HCC) [I50.31]             The patient is a pleasant 78 Y M with a h/o former smoking, HTN, HLD, DM2, CAD s/p CABG in 2010, and CHF.  He presents with about a week of worsening dyspnea.  Significant orthopnea and paroxysmal nocturnal dyspnea.  He feels edematous, particularly in his abdomen.  He has been gaining an unknown amount of weight.  He is compliant with his medications.  Denies significant cough.  No fevers or chills.  CT in the ED confirmed pulmonary edema and he had prominent rales on exam.  No chest pain.           Acute on chronic diastolic CHF   - he normally takes torsemide 50 BID (changed to furosemide gtt at 10mg/hr here), metolazone 2.5 QOD (continued here), and spironolactone 25 QD (continued here). - he isn't on ACEi/ARB due to unstable renal function.  Started hydralazine.  Continue ISMN. - continued carvedilol   - in 8/2021 he was admitted with CHF and diuresed from 311 to 297 lbs over the course of a week.    - TTE 8/2021 showed normal EF and moderate AS.  Repeat TTE here didn't show any significant changes. - he has pulmonary HTN but isn't able to tolerate BiPAP for his HENNA, which is complicating his CHF. - iron sucrose IV, then resume oral med       Acute on chronic hypoxic respiratory failure.  Due to above, treat accordingly.  Wean to baseline 3.5 LNC.       CKD3.  Baseline Cr perhaps about 1.4, monitor.       DM2.  Adjusted insulin regimen while here.  A1c 7.6.       Constipation.  Bowel regimen.  May consider enema or relistor if no progress.       CAD.  Aspirin, statin, carvedilol, ISMN.     Incidental finding of small lung nodules.  Due to his smoking history, radiology recommended that he have another CT in 3-6 months.  Defer to PCP.       Of note, he is Jehova's witness and does not want transfusions.                DVT Prophylaxis: enoxaparin   Diet: ADULT DIET; Regular; 4 carb choices (60 gm/meal); Low Sodium (2 gm)   Code Status: Full Code       PT/OT Eval Status: rec'd home PT/OT.       Dispo - when he is adequately diuresed.  Perhaps 1/16 - 1/22.  He lives at home and has [facility].

## 2022-01-17 NOTE — CARE COORDINATION
CASE MANAGEMENT DISCHARGE SUMMARY      Discharge to: home w/[facility] [place]  IMM given: (date) 01/17/2022    New Durable Medical Equipment ordered/agency: [facility]; active w/[facility] for nonskilled and has home O2, friends bringing tanks    Transportation: private  Confirmed discharge plan with:     Patient: yes   RN, name: Teresita Fleming RN    Note: Lum Basilio from [facility] will pull orders. Family bringing tanks. [facility] informed of pt discharging.   Jelly Simmons, RN

## 2022-01-17 NOTE — PROGRESS NOTES
Perfect serve to MD Chilango  I saw DC order. Pt still is on Lasix drip. Can we get an order for discontinuation? Also, his mag resulted at 1.7. Do you want replacement before he leaves? Thanks!     Addendum: Per MD Chilango, order 1g magnesium sulfate, discontinue lasix gtt

## 2022-01-19 ENCOUNTER — FOLLOWUP TELEPHONE ENCOUNTER (OUTPATIENT)
Dept: TELEMETRY | Age: 67
End: 2022-01-19

## 2022-01-19 PROBLEM — M50.30 DEGENERATION OF CERVICAL INTERVERTEBRAL DISC: Status: ACTIVE | Noted: 2022-01-19

## 2022-01-19 PROBLEM — M48.02 CERVICAL STENOSIS OF SPINAL CANAL: Status: ACTIVE | Noted: 2022-01-19

## 2022-01-19 NOTE — TELEPHONE ENCOUNTER
1st Attempt; No Answer- Left HIPAA compliant voicemail with Non-Urgent Heart Failure Resource Line number for call back.        Pili Lujan RN

## 2022-01-20 ENCOUNTER — FOLLOWUP TELEPHONE ENCOUNTER (OUTPATIENT)
Dept: TELEMETRY | Age: 67
End: 2022-01-20

## 2022-01-20 NOTE — TELEPHONE ENCOUNTER
Spoke to patient for hospital follow up. Pt states he is doing well. Weight is maintaining stable. Denies any needs.  Dante Giles RN

## 2022-01-25 ENCOUNTER — TELEPHONE (OUTPATIENT)
Dept: PULMONOLOGY | Age: 67
End: 2022-01-25

## 2022-01-26 NOTE — TELEPHONE ENCOUNTER
BiPAP download report from 12/12/2021-1/10/2022 on BiPAP 14/8 cm H2O reviewed. Compliance is poor 0%. Patient has used BiPAP 6/30 days however 0 days with greater than 4 hours of use. AHI is good 2.1. I recommend BiPAP titration as soon as possible to evaluate need for O2 with BiPAP (this was ordered at last visit). He can request connector from DME to bleed in O2 to BiPAP in the meantime if he was told to use O2 24/7 at hospital discharge.       I recommend he use BIPAP at least 4 hours a night and ideally all night every night for maximum benefit
I called patient and explained Shannan's message. Patient verbalized understanding.
including: systemic hypertension, pulmonary hypertension, cardiovascular morbidities, car accidents and all cause mortality.  -Patient education regarding sleep tips and PAP cleaning recommendations

## 2022-02-10 ENCOUNTER — HOSPITAL ENCOUNTER (OUTPATIENT)
Age: 67
Setting detail: OUTPATIENT SURGERY
Discharge: HOME | End: 2022-02-10
Attending: PAIN MEDICINE | Admitting: PAIN MEDICINE
Payer: MEDICARE

## 2022-02-10 VITALS
TEMPERATURE: 98 F | HEIGHT: 69 IN | WEIGHT: 280 LBS | BODY MASS INDEX: 41.47 KG/M2 | RESPIRATION RATE: 16 BRPM | DIASTOLIC BLOOD PRESSURE: 55 MMHG | HEART RATE: 70 BPM | OXYGEN SATURATION: 92 % | SYSTOLIC BLOOD PRESSURE: 119 MMHG

## 2022-02-10 LAB
GLUCOSE BLD-MCNC: 277 MG/DL (ref 70–99)
PERFORMED ON: ABNORMAL

## 2022-02-10 PROCEDURE — 7100000010 HC PHASE II RECOVERY - FIRST 15 MIN: Performed by: PAIN MEDICINE

## 2022-02-10 PROCEDURE — 62321 NJX INTERLAMINAR CRV/THRC: CPT | Performed by: PAIN MEDICINE

## 2022-02-10 PROCEDURE — 3600000012 HC SURGERY LEVEL 2 ADDTL 15MIN: Performed by: PAIN MEDICINE

## 2022-02-10 PROCEDURE — 2500000003 HC RX 250 WO HCPCS: Performed by: PAIN MEDICINE

## 2022-02-10 PROCEDURE — 3600000002 HC SURGERY LEVEL 2 BASE: Performed by: PAIN MEDICINE

## 2022-02-10 PROCEDURE — 7100000011 HC PHASE II RECOVERY - ADDTL 15 MIN: Performed by: PAIN MEDICINE

## 2022-02-10 PROCEDURE — 2709999900 HC NON-CHARGEABLE SUPPLY: Performed by: PAIN MEDICINE

## 2022-02-10 PROCEDURE — 6360000002 HC RX W HCPCS: Performed by: PAIN MEDICINE

## 2022-02-10 RX ORDER — LIDOCAINE HYDROCHLORIDE 10 MG/ML
INJECTION, SOLUTION EPIDURAL; INFILTRATION; INTRACAUDAL; PERINEURAL PRN
Status: DISCONTINUED | OUTPATIENT
Start: 2022-02-10 | End: 2022-02-10 | Stop reason: ALTCHOICE

## 2022-02-10 RX ORDER — BETAMETHASONE SODIUM PHOSPHATE AND BETAMETHASONE ACETATE 3; 3 MG/ML; MG/ML
INJECTION, SUSPENSION INTRA-ARTICULAR; INTRALESIONAL; INTRAMUSCULAR; SOFT TISSUE
Status: DISCONTINUED
Start: 2022-02-10 | End: 2022-02-10 | Stop reason: HOSPADM

## 2022-02-10 RX ORDER — ACETAMINOPHEN, ASPIRIN AND CAFFEINE 250; 250; 65 MG/1; MG/1; MG/1
1 TABLET, FILM COATED ORAL EVERY 6 HOURS PRN
Status: ON HOLD | COMMUNITY
End: 2022-04-09 | Stop reason: HOSPADM

## 2022-02-10 ASSESSMENT — PAIN - FUNCTIONAL ASSESSMENT
PAIN_FUNCTIONAL_ASSESSMENT: 0-10
PAIN_FUNCTIONAL_ASSESSMENT: PREVENTS OR INTERFERES WITH MANY ACTIVE NOT PASSIVE ACTIVITIES

## 2022-02-10 ASSESSMENT — PAIN DESCRIPTION - DESCRIPTORS: DESCRIPTORS: ACHING;CONSTANT

## 2022-02-10 NOTE — PROGRESS NOTES
Pt arrived to [facility], states neck is stiff and with movements level 9/10, vitals stable, site unremarkable

## 2022-02-10 NOTE — PROCEDURES
Skyler Thomas is a 78 y.o. male patient. No diagnosis found. Past Medical History:   Diagnosis Date    Anemia     Angina     Arthritis     CAD (coronary artery disease)     [facility]     CKD (chronic kidney disease) stage 3, GFR 30-59 ml/min (HCC)     Clostridium difficile diarrhea 3/16/12; 2/29/12    positive stool toxin    Diabetes mellitus ([address]     Diabetic neuropathy ([facility])     feet and legs    Disease of blood and blood forming organ     Dizziness     When he moves his head/ loses his balance    GERD (gastroesophageal reflux disease)     gastric ulcer    Headache     Hearing loss     Hyperlipidemia     Hypertension     Kidney stone 2002    Refusal of blood transfusions as patient is Jehovah's witness     Sleep apnea     Tinnitus     Venous ulcer ([facility])     LLE    Wound, open 1/13/2012     Blood pressure (!) 142/63, pulse 74, temperature 98 °F (36.7 °C), temperature source Temporal, resp. rate 22, height 5' 9\" (1.753 m), weight 280 lb (127 kg), SpO2 92 %. El Tony MD  2/10/2022      DICTATING PHYSICIAN: Cheri Flores M.D        PREOPERATIVE DIAGNOSES: Cervical radiculopathy 723.4, M54.12       POSTOPERATIVE DIAGNOSES: Cervical radiculopathy       OPERATIVE PROCEDURES:  Cervical epidural steroid injection under fluoroscopic guidance at _M C67__ level, intra-laminar approach. [id number] with [address] [place] [address] Road: Cheri Flores M.D   INDICATIONS:  Cervical radiculopathy  OPERATIVE PROCEDURE:  Consent signed by patient after risks and benefits explained. Patient was in prone position. Neck was prepped with Prevail and draped. Aseptic technique used at every stage of the procedure. Skin wheal with 1% Lidocaine with 30-gauge needle. _18__ -gauge Tuohy needle placed under AP fluoroscopic guidance to contact the superior part of the lamina at _C7__ level on the _R__ side. The spinous process was in midline.  The needle was then advanced anteriorly and superiorly under fluoroscopic guidance into the interlaminar epidural space with the help of loss of resistance technique. Aspiration was negative for CSF and blood. Injection of Omnipaque dye(0.5cc) showed appropriate spread confirming epidural needle placement. _0_ cc of dye with _9 mg of CELESTONE was injected. Needle pulled out intact. Patient tolerated procedure well. Discharge instructions were given. Injection under low pressure. Patient was monitored and stable.      ESTIMATED BLOOD LOSS:  Less than 1 cc    <<Signature User>>   ________________________________  Benoit Shaffer M.D.

## 2022-02-21 ENCOUNTER — TELEPHONE (OUTPATIENT)
Dept: PULMONOLOGY | Age: 67
End: 2022-02-21

## 2022-02-21 NOTE — TELEPHONE ENCOUNTER
Patient cancelled appointment on 2/22/22 with Rossy Mulligan CNP for 6 week after titration. Reason: pt has home care nurse coming and won't be able to complete appt    Patient did reschedule appointment. Appointment rescheduled for 3/29/22. Last VV 12/10/21:    Assessment:       · Severe HENNA. BIPAP 14/8 cm H2O. patient not tolerating BiPAP, states he is using O2 at night currently  · Hypersomnia- sedating medications, poor sleep hygiene, co morbid conditions, likely contributing-improving. · Irregular sleep pattern due to pain  · Poor sleep hygiene  · Obesity   · Cardiomyopathy, CAD s/p CABG x3, pulmonary HTN, neuropathy, DM, HTN  · Chronic pain- followed by pain management.     Plan:       -BiPAP titration as patient is not tolerating BiPAP and to evaluate for O2 needs if any  -Discussed severity of sleep apnea and importance of BiPAP use  -Supply order to DME of patient's choice  -Chin strap if not using full face mask  - Advised to use BiPAP 6-8 hrs at night and during naps-continue to increase use. - Replacement of mask, tubing, head straps every 3-6 months or sooner if damaged. - Patient instructed to contact DME company for any mask, tubing or machine trouble shooting if problems arise.  - Sleep hygiene  -Continue to work with pain management for chronic pain  -Cognitive behavioral therapy was discussed with patient including stimulus control and sleep restriction   -Recommend set bed/wake times, no naps during the daytime. Use BiPAP when sleeping.  - Avoid sedatives, alcohol and caffeinated drinks at bed time. - Patient counseled to never drive or operate heavy machinery while fatigue, drowsy or sleepy- patient verbalized understanding and agrees.    - Weight loss is recommended as a long-term intervention.    - Complications of HENNA if not treated were discussed with patient patient, including: systemic hypertension, pulmonary hypertension, cardiovascular morbidities, car accidents and all cause mortality.  -Patient education regarding sleep tips and PAP cleaning recommendations

## 2022-02-23 NOTE — TELEPHONE ENCOUNTER
Patient returned called states he cannot have titration is unable to find a ride there and does not want to have study completed.

## 2022-03-01 ENCOUNTER — TELEPHONE (OUTPATIENT)
Dept: ENT CLINIC | Age: 67
End: 2022-03-01

## 2022-03-01 DIAGNOSIS — H92.02 OTALGIA OF LEFT EAR: Primary | ICD-10-CM

## 2022-03-01 RX ORDER — CIPROFLOXACIN HYDROCHLORIDE 3.5 MG/ML
SOLUTION/ DROPS TOPICAL
Qty: 10 ML | Refills: 0 | Status: ON HOLD | OUTPATIENT
Start: 2022-03-01 | End: 2022-03-14 | Stop reason: HOSPADM

## 2022-03-01 NOTE — TELEPHONE ENCOUNTER
Patient called and wants Dr. Oziel Valencia that he is in a lot of pain. Patient call back: [phone]    I explained that Dr. Oziel Valencia is in clinic and will try and responded when he has a chance.

## 2022-03-01 NOTE — TELEPHONE ENCOUNTER
Patient would like to know if he can have a prescription for antibiotics, feels he is getting an ear infection.     Last office visit was 9-13-21    Patient call back [phone]

## 2022-03-01 NOTE — TELEPHONE ENCOUNTER
Patient states left ear otalgia - no otorrhea, will prescribe him ciprofloxacin ear drops and he states he will call tomorrow morning to arrange for follow up ASAP

## 2022-03-02 ENCOUNTER — TELEPHONE (OUTPATIENT)
Dept: ENT CLINIC | Age: 67
End: 2022-03-02

## 2022-03-02 DIAGNOSIS — H92.02 OTALGIA OF LEFT EAR: Primary | ICD-10-CM

## 2022-03-02 RX ORDER — AMOXICILLIN AND CLAVULANATE POTASSIUM 875; 125 MG/1; MG/1
1 TABLET, FILM COATED ORAL 2 TIMES DAILY
Qty: 14 TABLET | Refills: 0 | Status: ON HOLD | OUTPATIENT
Start: 2022-03-02 | End: 2022-03-14 | Stop reason: HOSPADM

## 2022-03-02 NOTE — TELEPHONE ENCOUNTER
Patient really wants Dr. Libra Mcqueen to call him back as soon as possible. [phone]    Please look at yesterday phone encounter to get details.

## 2022-03-02 NOTE — TELEPHONE ENCOUNTER
Patient is calling and really would like to talk to Dr. Oziel Valencia.     Patient number is [phone]

## 2022-03-03 NOTE — TELEPHONE ENCOUNTER
Patient called and stated his is a lot of pain. I offered the patient an appointment today or on Monday with Dr. Jose Noble. Patient declined both days. Patient was unsure what he wanted to do.

## 2022-03-07 ENCOUNTER — HOSPITAL ENCOUNTER (INPATIENT)
Age: 67
LOS: 7 days | Discharge: HOME HEALTH | DRG: 291 | End: 2022-03-14
Attending: EMERGENCY MEDICINE
Payer: MEDICARE

## 2022-03-07 ENCOUNTER — APPOINTMENT (OUTPATIENT)
Dept: GENERAL RADIOLOGY | Age: 67
DRG: 291 | End: 2022-03-07
Payer: MEDICARE

## 2022-03-07 DIAGNOSIS — I50.33 ACUTE ON CHRONIC DIASTOLIC CHF (CONGESTIVE HEART FAILURE) (HCC): ICD-10-CM

## 2022-03-07 DIAGNOSIS — I50.9 ACUTE ON CHRONIC CONGESTIVE HEART FAILURE, UNSPECIFIED HEART FAILURE TYPE (HCC): Primary | ICD-10-CM

## 2022-03-07 DIAGNOSIS — R06.09 DOE (DYSPNEA ON EXERTION): ICD-10-CM

## 2022-03-07 DIAGNOSIS — H66.90 ACUTE OTITIS MEDIA, UNSPECIFIED OTITIS MEDIA TYPE: ICD-10-CM

## 2022-03-07 DIAGNOSIS — G89.4 CHRONIC PAIN SYNDROME: ICD-10-CM

## 2022-03-07 LAB
A/G RATIO: 1.4 (ref 1.1–2.2)
ALBUMIN SERPL-MCNC: 4.1 G/DL (ref 3.4–5)
ALP BLD-CCNC: 109 U/L (ref 40–129)
ALT SERPL-CCNC: 12 U/L (ref 10–40)
ANION GAP SERPL CALCULATED.3IONS-SCNC: 15 MMOL/L (ref 3–16)
AST SERPL-CCNC: 14 U/L (ref 15–37)
BASE EXCESS VENOUS: 7.2 MMOL/L (ref -3–3)
BASOPHILS ABSOLUTE: 0 K/UL (ref 0–0.2)
BASOPHILS RELATIVE PERCENT: 0.5 %
BILIRUB SERPL-MCNC: 0.4 MG/DL (ref 0–1)
BUN BLDV-MCNC: 76 MG/DL (ref 7–20)
CALCIUM SERPL-MCNC: 9.2 MG/DL (ref 8.3–10.6)
CARBOXYHEMOGLOBIN: 3 % (ref 0–1.5)
CHLORIDE BLD-SCNC: 88 MMOL/L (ref 99–110)
CO2: 31 MMOL/L (ref 21–32)
CREAT SERPL-MCNC: 1.7 MG/DL (ref 0.8–1.3)
EKG ATRIAL RATE: 60 BPM
EKG DIAGNOSIS: NORMAL
EKG P AXIS: 113 DEGREES
EKG P-R INTERVAL: 188 MS
EKG Q-T INTERVAL: 424 MS
EKG QRS DURATION: 88 MS
EKG QTC CALCULATION (BAZETT): 424 MS
EKG R AXIS: -54 DEGREES
EKG T AXIS: 117 DEGREES
EKG VENTRICULAR RATE: 60 BPM
EOSINOPHILS ABSOLUTE: 0.1 K/UL (ref 0–0.6)
EOSINOPHILS RELATIVE PERCENT: 1.8 %
GFR AFRICAN AMERICAN: 49
GFR NON-AFRICAN AMERICAN: 40
GLUCOSE BLD-MCNC: 228 MG/DL (ref 70–99)
GLUCOSE BLD-MCNC: 268 MG/DL (ref 70–99)
HCO3 VENOUS: 33.3 MMOL/L (ref 23–29)
HCT VFR BLD CALC: 32.1 % (ref 40.5–52.5)
HEMOGLOBIN: 10.6 G/DL (ref 13.5–17.5)
LACTIC ACID: 0.9 MMOL/L (ref 0.4–2)
LYMPHOCYTES ABSOLUTE: 0.4 K/UL (ref 1–5.1)
LYMPHOCYTES RELATIVE PERCENT: 5.2 %
MCH RBC QN AUTO: 28.6 PG (ref 26–34)
MCHC RBC AUTO-ENTMCNC: 33.1 G/DL (ref 31–36)
MCV RBC AUTO: 86.3 FL (ref 80–100)
METHEMOGLOBIN VENOUS: 0.5 %
MONOCYTES ABSOLUTE: 0.5 K/UL (ref 0–1.3)
MONOCYTES RELATIVE PERCENT: 6.1 %
NEUTROPHILS ABSOLUTE: 6.7 K/UL (ref 1.7–7.7)
NEUTROPHILS RELATIVE PERCENT: 86.4 %
O2 SAT, VEN: 89 %
O2 THERAPY: ABNORMAL
PCO2, VEN: 54.9 MMHG (ref 40–50)
PDW BLD-RTO: 17.2 % (ref 12.4–15.4)
PERFORMED ON: ABNORMAL
PH VENOUS: 7.4 (ref 7.35–7.45)
PLATELET # BLD: 190 K/UL (ref 135–450)
PMV BLD AUTO: 7.7 FL (ref 5–10.5)
PO2, VEN: 58.8 MMHG (ref 25–40)
POTASSIUM REFLEX MAGNESIUM: 3.7 MMOL/L (ref 3.5–5.1)
PRO-BNP: 1723 PG/ML (ref 0–124)
PROCALCITONIN: 0.3 NG/ML (ref 0–0.15)
RBC # BLD: 3.72 M/UL (ref 4.2–5.9)
SARS-COV-2, NAAT: NOT DETECTED
SODIUM BLD-SCNC: 134 MMOL/L (ref 136–145)
TCO2 CALC VENOUS: 35 MMOL/L
TOTAL PROTEIN: 7 G/DL (ref 6.4–8.2)
TROPONIN: 0.05 NG/ML
WBC # BLD: 7.8 K/UL (ref 4–11)

## 2022-03-07 PROCEDURE — 83605 ASSAY OF LACTIC ACID: CPT

## 2022-03-07 PROCEDURE — 93010 ELECTROCARDIOGRAM REPORT: CPT | Performed by: INTERNAL MEDICINE

## 2022-03-07 PROCEDURE — 99284 EMERGENCY DEPT VISIT MOD MDM: CPT

## 2022-03-07 PROCEDURE — 84484 ASSAY OF TROPONIN QUANT: CPT

## 2022-03-07 PROCEDURE — 82803 BLOOD GASES ANY COMBINATION: CPT

## 2022-03-07 PROCEDURE — 93005 ELECTROCARDIOGRAM TRACING: CPT | Performed by: EMERGENCY MEDICINE

## 2022-03-07 PROCEDURE — 87635 SARS-COV-2 COVID-19 AMP PRB: CPT

## 2022-03-07 PROCEDURE — 6360000002 HC RX W HCPCS: Performed by: EMERGENCY MEDICINE

## 2022-03-07 PROCEDURE — 96375 TX/PRO/DX INJ NEW DRUG ADDON: CPT

## 2022-03-07 PROCEDURE — 2700000000 HC OXYGEN THERAPY PER DAY

## 2022-03-07 PROCEDURE — 94761 N-INVAS EAR/PLS OXIMETRY MLT: CPT

## 2022-03-07 PROCEDURE — 83550 IRON BINDING TEST: CPT

## 2022-03-07 PROCEDURE — 6370000000 HC RX 637 (ALT 250 FOR IP): Performed by: INTERNAL MEDICINE

## 2022-03-07 PROCEDURE — 71045 X-RAY EXAM CHEST 1 VIEW: CPT

## 2022-03-07 PROCEDURE — 83540 ASSAY OF IRON: CPT

## 2022-03-07 PROCEDURE — 83880 ASSAY OF NATRIURETIC PEPTIDE: CPT

## 2022-03-07 PROCEDURE — 85025 COMPLETE CBC W/AUTO DIFF WBC: CPT

## 2022-03-07 PROCEDURE — 1200000000 HC SEMI PRIVATE

## 2022-03-07 PROCEDURE — 84145 PROCALCITONIN (PCT): CPT

## 2022-03-07 PROCEDURE — 2580000003 HC RX 258: Performed by: EMERGENCY MEDICINE

## 2022-03-07 PROCEDURE — 80053 COMPREHEN METABOLIC PANEL: CPT

## 2022-03-07 PROCEDURE — 96374 THER/PROPH/DIAG INJ IV PUSH: CPT

## 2022-03-07 PROCEDURE — 2580000003 HC RX 258: Performed by: INTERNAL MEDICINE

## 2022-03-07 PROCEDURE — 87040 BLOOD CULTURE FOR BACTERIA: CPT

## 2022-03-07 RX ORDER — CARVEDILOL 6.25 MG/1
12.5 TABLET ORAL 2 TIMES DAILY WITH MEALS
Status: DISCONTINUED | OUTPATIENT
Start: 2022-03-07 | End: 2022-03-08

## 2022-03-07 RX ORDER — OXYCODONE HYDROCHLORIDE AND ACETAMINOPHEN 5; 325 MG/1; MG/1
1 TABLET ORAL EVERY 6 HOURS PRN
Status: DISCONTINUED | OUTPATIENT
Start: 2022-03-07 | End: 2022-03-14 | Stop reason: HOSPADM

## 2022-03-07 RX ORDER — ISOSORBIDE MONONITRATE 30 MG/1
30 TABLET, EXTENDED RELEASE ORAL DAILY
Status: DISCONTINUED | OUTPATIENT
Start: 2022-03-08 | End: 2022-03-14 | Stop reason: HOSPADM

## 2022-03-07 RX ORDER — DEXTROSE MONOHYDRATE 25 G/50ML
12.5 INJECTION, SOLUTION INTRAVENOUS PRN
Status: DISCONTINUED | OUTPATIENT
Start: 2022-03-07 | End: 2022-03-07 | Stop reason: CLARIF

## 2022-03-07 RX ORDER — SODIUM CHLORIDE 0.9 % (FLUSH) 0.9 %
5-40 SYRINGE (ML) INJECTION PRN
Status: DISCONTINUED | OUTPATIENT
Start: 2022-03-07 | End: 2022-03-14 | Stop reason: HOSPADM

## 2022-03-07 RX ORDER — CIPROFLOXACIN HYDROCHLORIDE 3.5 MG/ML
4 SOLUTION/ DROPS TOPICAL 2 TIMES DAILY
Status: DISCONTINUED | OUTPATIENT
Start: 2022-03-07 | End: 2022-03-08

## 2022-03-07 RX ORDER — ACETAMINOPHEN 325 MG/1
650 TABLET ORAL EVERY 6 HOURS PRN
Status: DISCONTINUED | OUTPATIENT
Start: 2022-03-07 | End: 2022-03-14 | Stop reason: HOSPADM

## 2022-03-07 RX ORDER — ALLOPURINOL 300 MG/1
300 TABLET ORAL DAILY
Status: DISCONTINUED | OUTPATIENT
Start: 2022-03-08 | End: 2022-03-14 | Stop reason: HOSPADM

## 2022-03-07 RX ORDER — AMOXICILLIN AND CLAVULANATE POTASSIUM 875; 125 MG/1; MG/1
1 TABLET, FILM COATED ORAL 2 TIMES DAILY
Status: DISCONTINUED | OUTPATIENT
Start: 2022-03-07 | End: 2022-03-08

## 2022-03-07 RX ORDER — SODIUM CHLORIDE 9 MG/ML
25 INJECTION, SOLUTION INTRAVENOUS PRN
Status: DISCONTINUED | OUTPATIENT
Start: 2022-03-07 | End: 2022-03-14 | Stop reason: HOSPADM

## 2022-03-07 RX ORDER — DEXTROSE MONOHYDRATE 50 MG/ML
100 INJECTION, SOLUTION INTRAVENOUS PRN
Status: DISCONTINUED | OUTPATIENT
Start: 2022-03-07 | End: 2022-03-14 | Stop reason: HOSPADM

## 2022-03-07 RX ORDER — POLYETHYLENE GLYCOL 3350 17 G/17G
17 POWDER, FOR SOLUTION ORAL DAILY PRN
Status: DISCONTINUED | OUTPATIENT
Start: 2022-03-07 | End: 2022-03-14 | Stop reason: HOSPADM

## 2022-03-07 RX ORDER — ASPIRIN 81 MG/1
81 TABLET, CHEWABLE ORAL DAILY
Status: DISCONTINUED | OUTPATIENT
Start: 2022-03-08 | End: 2022-03-14 | Stop reason: HOSPADM

## 2022-03-07 RX ORDER — NICOTINE POLACRILEX 4 MG
15 LOZENGE BUCCAL PRN
Status: DISCONTINUED | OUTPATIENT
Start: 2022-03-07 | End: 2022-03-14 | Stop reason: HOSPADM

## 2022-03-07 RX ORDER — COLCHICINE 0.6 MG/1
0.6 TABLET ORAL DAILY
Status: DISCONTINUED | OUTPATIENT
Start: 2022-03-08 | End: 2022-03-10

## 2022-03-07 RX ORDER — ACETAMINOPHEN 650 MG/1
650 SUPPOSITORY RECTAL EVERY 6 HOURS PRN
Status: DISCONTINUED | OUTPATIENT
Start: 2022-03-07 | End: 2022-03-14 | Stop reason: HOSPADM

## 2022-03-07 RX ORDER — SODIUM CHLORIDE 0.9 % (FLUSH) 0.9 %
5-40 SYRINGE (ML) INJECTION EVERY 12 HOURS SCHEDULED
Status: DISCONTINUED | OUTPATIENT
Start: 2022-03-07 | End: 2022-03-14 | Stop reason: HOSPADM

## 2022-03-07 RX ORDER — FUROSEMIDE 10 MG/ML
20 INJECTION INTRAMUSCULAR; INTRAVENOUS ONCE
Status: COMPLETED | OUTPATIENT
Start: 2022-03-07 | End: 2022-03-07

## 2022-03-07 RX ORDER — INSULIN GLARGINE 100 [IU]/ML
60 INJECTION, SOLUTION SUBCUTANEOUS 2 TIMES DAILY
Status: DISCONTINUED | OUTPATIENT
Start: 2022-03-07 | End: 2022-03-14 | Stop reason: HOSPADM

## 2022-03-07 RX ORDER — ONDANSETRON 2 MG/ML
4 INJECTION INTRAMUSCULAR; INTRAVENOUS EVERY 6 HOURS PRN
Status: DISCONTINUED | OUTPATIENT
Start: 2022-03-07 | End: 2022-03-14 | Stop reason: HOSPADM

## 2022-03-07 RX ORDER — ONDANSETRON 4 MG/1
4 TABLET, ORALLY DISINTEGRATING ORAL EVERY 8 HOURS PRN
Status: DISCONTINUED | OUTPATIENT
Start: 2022-03-07 | End: 2022-03-14 | Stop reason: HOSPADM

## 2022-03-07 RX ORDER — HYDRALAZINE HYDROCHLORIDE 10 MG/1
10 TABLET, FILM COATED ORAL EVERY 8 HOURS SCHEDULED
Status: DISCONTINUED | OUTPATIENT
Start: 2022-03-07 | End: 2022-03-14 | Stop reason: HOSPADM

## 2022-03-07 RX ORDER — PANTOPRAZOLE SODIUM 40 MG/1
40 TABLET, DELAYED RELEASE ORAL
Status: DISCONTINUED | OUTPATIENT
Start: 2022-03-08 | End: 2022-03-14 | Stop reason: HOSPADM

## 2022-03-07 RX ORDER — ATORVASTATIN CALCIUM 40 MG/1
40 TABLET, FILM COATED ORAL NIGHTLY
Status: DISCONTINUED | OUTPATIENT
Start: 2022-03-07 | End: 2022-03-10

## 2022-03-07 RX ORDER — SENNA PLUS 8.6 MG/1
1 TABLET ORAL 2 TIMES DAILY
Status: DISCONTINUED | OUTPATIENT
Start: 2022-03-07 | End: 2022-03-14 | Stop reason: HOSPADM

## 2022-03-07 RX ORDER — FUROSEMIDE 10 MG/ML
40 INJECTION INTRAMUSCULAR; INTRAVENOUS 2 TIMES DAILY
Status: DISCONTINUED | OUTPATIENT
Start: 2022-03-08 | End: 2022-03-08

## 2022-03-07 RX ADMIN — OXYCODONE AND ACETAMINOPHEN 1 TABLET: 5; 325 TABLET ORAL at 21:23

## 2022-03-07 RX ADMIN — INSULIN GLARGINE 60 UNITS: 100 INJECTION, SOLUTION SUBCUTANEOUS at 21:33

## 2022-03-07 RX ADMIN — AMOXICILLIN AND CLAVULANATE POTASSIUM 1 TABLET: 875; 125 TABLET, FILM COATED ORAL at 21:23

## 2022-03-07 RX ADMIN — HYDRALAZINE HYDROCHLORIDE 10 MG: 10 TABLET, FILM COATED ORAL at 21:24

## 2022-03-07 RX ADMIN — CIPROFLOXACIN 4 DROP: 3 SOLUTION OPHTHALMIC at 21:33

## 2022-03-07 RX ADMIN — FUROSEMIDE 20 MG: 10 INJECTION, SOLUTION INTRAMUSCULAR; INTRAVENOUS at 16:32

## 2022-03-07 RX ADMIN — CEFTRIAXONE SODIUM 1000 MG: 1 INJECTION, POWDER, FOR SOLUTION INTRAMUSCULAR; INTRAVENOUS at 16:32

## 2022-03-07 RX ADMIN — CARVEDILOL 12.5 MG: 6.25 TABLET, FILM COATED ORAL at 21:32

## 2022-03-07 RX ADMIN — ATORVASTATIN CALCIUM 40 MG: 40 TABLET, FILM COATED ORAL at 21:23

## 2022-03-07 RX ADMIN — AZITHROMYCIN MONOHYDRATE 500 MG: 500 INJECTION, POWDER, LYOPHILIZED, FOR SOLUTION INTRAVENOUS at 17:10

## 2022-03-07 RX ADMIN — INSULIN LISPRO 3 UNITS: 100 INJECTION, SOLUTION INTRAVENOUS; SUBCUTANEOUS at 21:33

## 2022-03-07 RX ADMIN — SENNOSIDES 8.6 MG: 8.6 TABLET, COATED ORAL at 21:23

## 2022-03-07 RX ADMIN — SODIUM CHLORIDE, PRESERVATIVE FREE 10 ML: 5 INJECTION INTRAVENOUS at 21:27

## 2022-03-07 ASSESSMENT — PAIN SCALES - GENERAL
PAINLEVEL_OUTOF10: 3
PAINLEVEL_OUTOF10: 8

## 2022-03-07 NOTE — H&P
Hospital Medicine History & Physical      PCP: Rajat Marcano, APRN - CNP    Date of Admission: 3/7/2022    Date of Service: Pt seen/examined on 3/7/2022 and Admitted to Inpatient with expected LOS greater than two midnights due to medical therapy. Chief Complaint:  SOB    History Of Present Illness:   78 y.o. male who presented to [facility] with SOB. PMHx significant for former smoking, HTN, HLD, DM2, CAD s/p CABG in 2010, and dCHF. He has had recurrent hospitalizations for CHF, most recently 1/2022. He reports compliance with his diuretic regimen of Torsemide, Aldactone and Metolazone. He reports progressive increase in SOB/TAYLOR as well as worsening LE edema. He also notes recent issues with left ear infection. He was initially started on Cipro drops and later Augmentin (3/2/22) by ENT Dr. Beverly Lawrence.  He is concerned that the ear infection has not resolved as he has continued pain     Past Medical History:        Diagnosis Date    Anemia     Angina     Arthritis     CAD (coronary artery disease)     [facility]     CKD (chronic kidney disease) stage 3, GFR 30-59 ml/min ([facility])     Clostridium difficile diarrhea 3/16/12; 2/29/12    positive stool toxin    Diabetes mellitus ([address]     Diabetic neuropathy ([address]     feet and legs    Disease of blood and blood forming organ     Dizziness     When he moves his head/ loses his balance    GERD (gastroesophageal reflux disease)     gastric ulcer    Headache     Hearing loss     Hyperlipidemia     Hypertension     Kidney stone 2002    Refusal of blood transfusions as patient is [employer]     Sleep apnea     Tinnitus     Venous ulcer ([address]     LLE    Wound, open 1/13/2012       Past Surgical History:        Procedure Laterality Date    CARDIAC SURGERY      Dec 27 2010, triple bypass    CHOLECYSTECTOMY  9/2011    HERNIA REPAIR  age2    OTHER SURGICAL HISTORY  11-16-11 REPAIR LOWER STERNAL INCISION, REMOVAL OF ONE STERNAL WIRE,    OTHER SURGICAL HISTORY  10/10/2014    phacoemulsification of cataract with intraocular lens implant left eye    PAIN MANAGEMENT PROCEDURE N/A 2/10/2022    MIDLINE CERVICAL SIX SEVEN EPIDURAL STEROID INJECTION SITE CONFIRMED BY FLUOROSCOPY performed by Wisam Stein MD at [address] [place] [address],[address] ENDOSCOPY         Medications Prior to Admission:   Prior to Admission medications    Medication Sig Start Date End Date Taking? Authorizing Provider   amoxicillin-clavulanate (AUGMENTIN) 875-125 MG per tablet Take 1 tablet by mouth 2 times daily for 7 days 3/2/22 3/9/22  Dev Moon MD   ciprofloxacin (CILOXAN) 0.3 % ophthalmic solution Apply 4 drops twice daily to the left ear 3/1/22   Dev Moon MD   aspirin-acetaminophen-caffeine (EXCEDRIN MIGRAINE) 702-573-83 MG per tablet Take 1 tablet by mouth every 6 hours as needed for Headaches    Historical Provider, MD   oxyCODONE-acetaminophen (PERCOCET) 5-325 MG per tablet Take 1 tablet by mouth 4 times daily for 30 days. 2/28/22 3/30/22  PATRICK Larsen CNP   hydrALAZINE (APRESOLINE) 10 MG tablet Take 1 tablet by mouth every 8 hours 1/17/22   Juno Norwood MD   torsemide (DEMADEX) 100 MG tablet Take half tablet twice per day, but on days when your weight is above 282 lbs, take a full tablet twice per day.  1/17/22   Juno Norwood MD   naloxone 4 MG/0.1ML LIQD nasal spray 1 spray by Nasal route as needed for Opioid Reversal 11/23/21   Wisam Stein MD   carvedilol (COREG) 12.5 MG tablet Take 1 tablet by mouth 2 times daily (with meals) 8/31/21   Lucio Guanaco APRN - CNP   spironolactone (ALDACTONE) 25 MG tablet Take 1 tablet by mouth daily 9/1/21   Lucio Guanaco APRLITTLE - CNP   metOLazone (ZAROXOLYN) 2.5 MG tablet Take 1 tablet by mouth every other day 8/31/21   Lucio Stains APRLITTLE - CNP   atorvastatin (LIPITOR) 40 MG tablet Take 1 tablet by mouth nightly 8/31/21   Lucio PATRICK Blanc - GILBERTO linaCLOtide (LINZESS PO) Take by mouth    Historical Provider, MD   clotrimazole (LOTRIMIN) 1 % external solution Apply 4 drops to the left ear twice daily (morning and evening) for 10 days 10/5/20   Son Araujo MD   acetaminophen (TYLENOL) 500 MG tablet Take 2 tablets by mouth 4 times daily as needed for Pain 9/17/20   PATRICK Hollingsworth CNP   hydrocortisone (ANUSOL-HC) 2.5 % rectal cream Place rectally 2 times daily. 8/7/19   PATRICK Manning CNP   senna (SENOKOT) 8.6 MG TABS tablet Take 1 tablet by mouth 2 times daily 7/8/19   PATRICK Medeiros CNP   colchicine (COLCRYS) 0.6 MG tablet Take 1 tablet by mouth daily 5/2/18   PATRICK Alvarado CNP   docusate sodium (COLACE, DULCOLAX) 100 MG CAPS Take 100 mg by mouth 2 times daily 5/1/18   PATRICK Alvarado CNP   insulin aspart (NOVOLOG) 100 UNIT/ML injection vial Inject into the skin 3 times daily (before meals) 50 units with breakfast, 20 units with lunch and 60 units with dinner    Historical Provider, MD   allopurinol (ZYLOPRIM) 100 MG tablet Take 300 mg by mouth     Historical Provider, MD   insulin glargine (LANTUS) 100 UNIT/ML injection vial Inject 55 Units into the skin 2 times daily  Patient taking differently: Inject 80 Units into the skin 2 times daily  1/15/17   Tricia Guallpa MD   Compression Stockings MISC by Does not apply route 2 pair, knee high compression stockings, fitted/ zippered 6/15/16   PATRICK Pham CNP   silver sulfADIAZINE (SILVADENE) 1 % cream Apply to BL lower leg ulcers daily 2/9/16   Roland Tee MD   nitroGLYCERIN (NITROSTAT) 0.4 MG SL tablet Place 1 tablet under the tongue every 5 minutes as needed 8/17/15   PATRICK Pham CNP   fluticasone (FLONASE) 50 MCG/ACT nasal spray 1 spray by Nasal route nightly as needed. Historical Provider, MD   aspirin 81 MG chewable tablet Take 1 tablet by mouth daily.  1/11/13   Cher Araya MD   isosorbide mononitrate (IMDUR) 30 MG CR tablet Take 1 tablet by mouth daily. 1/11/13   Ronnie Guillen MD   ferrous sulfate 325 (65 FE) MG tablet Take 325 mg by mouth 3 times daily (with meals). 12/22/10   Historical Provider, MD   omeprazole (PRILOSEC) 20 MG capsule Take 20 mg by mouth 2 times daily. 12/22/10   Historical Provider, MD       Allergies:   Amitriptyline, Celecoxib, Cymbalta [duloxetine hcl], Elavil [amitriptyline hcl], Gabapentin, and Nsaids    Social History:    TOBACCO:   reports that he quit smoking about 34 years ago. He has a 16.00 pack-year smoking history. He has never used smokeless tobacco.  ETOH:   reports no history of alcohol use. E-Cigarettes/Vaping Use     Questions Responses    E-Cigarette/Vaping Use Never User    Start Date     Passive Exposure     Quit Date     Counseling Given     Comments             Family History:          Problem Relation Age of Onset    Heart Disease Mother     Heart Disease Father     Cancer Father     Diabetes Brother     Diabetes Brother        REVIEW OF SYSTEMS:   Pertinent positives as noted in the HPI. All other systems reviewed with patient as able and negative. Physical Exam Performed:  /72   Pulse 66   Temp 98.3 °F (36.8 °C) (Oral)   Resp 20   Ht 5' 9\" (1.753 m)   Wt 283 lb (128.4 kg)   SpO2 98%   BMI 41.79 kg/m²   General appearance:  No apparent distress, appears stated age and cooperative. Morbidly obese. HEENT:  Pupils equal, round, and reactive to light. Conjunctivae/corneas clear. Neck:  Supple, no jugular venous distention. Trachea midline with full range of motion. Respiratory:  Normal respiratory effort. Diminished but clear to auscultation, bilaterally without Rales/Wheezes/Rhonchi. Cardiovascular:  Regular rate and rhythm with normal S1/S2 without murmurs, rubs or gallops. Abdomen:  Soft, non-tender, non-distended with normal bowel sounds. Musculoskelatal:  No clubbing, cyanosis. Tight LE edema bilaterally.   Full range of motion without deformity. Neurologic:  Neurovascularly intact without any focal sensory/motor deficits. Cranial nerves: II-XII intact, grossly non-focal.  Psychiatric:  Alert and oriented, thought content appropriate, normal insight  Skin:  Skin color, texture, turgor normal.  No rashes or lesions. Capillary Refill:  Brisk,< 3 seconds   Peripheral Pulses:  +2 palpable, equal bilaterally     Labs:     Recent Labs     03/07/22  1457   WBC 7.8   HGB 10.6*   HCT 32.1*        Recent Labs     03/07/22  1457   *   K 3.7   CL 88*   CO2 31   BUN 76*   CREATININE 1.7*   CALCIUM 9.2     Recent Labs     03/07/22  1457   AST 14*   ALT 12   BILITOT 0.4   ALKPHOS 109     No results for input(s): INR in the last 72 hours. Recent Labs     03/07/22  1457   TROPONINI 0.05*       Radiology:     CXR: I have reviewed the CXR with the following interpretation: Diffuse bilateral airspace disease most consistent with Pulmonary Edema. EKG:  I have reviewed the EKG with the following interpretation: SR with sinus arrhythmia, non-specific ST-T changes. XR CHEST PORTABLE    Result Date: 3/7/2022  EXAMINATION: ONE XRAY VIEW OF THE CHEST 3/7/2022 2:27 pm COMPARISON: Chest x-ray dated 13 January 2022 HISTORY: ORDERING SYSTEM PROVIDED HISTORY: SOB/TAYLOR TECHNOLOGIST PROVIDED HISTORY: Reason for exam:->SOB/TAYLOR Reason for Exam: SOB FINDINGS: The patient is rotated. Status post median sternotomy. Stable cardiomegaly. Similar appearance of diffuse airspace opacities. No pneumothorax or definite pleural effusion     Diffuse airspace opacities consistent with multifocal pneumonia. Stable cardiomegaly. ASSESSMENT:    Principal Problem:    Acute on chronic diastolic CHF (congestive heart failure) ([facility])  Active Problems:    DM (diabetes mellitus) ([address]    Essential hypertension    Morbid obesity ([facility]  Resolved Problems:    * No resolved hospital problems.  *      PLAN:    Acute on chronic diastolic CHF (congestive heart failure): Recurrent admissions for same, most recently 1/2022. Worsening dyspnea, LE edema, elevated BNP. CXR findings most consistent with CHF as appearance is similar to some of his recent admissions for CHF. Procal is borderline elevated but in setting of CKD. Do not feel he needs further Abx for Pneumonia, which has been ruled out. Continue IV Diuresis with Lasix. Monitor I/O, weights, BMP. Consider Cardiology evaluation pending clinical course. L Ear Otitis: He reports persistent left ear pain despite recent course of topical Cipro and PO Augmentin (started 3/2/22 by Dr. Karel Ren). Can ask for ENT input tomorrow. Chronic hypoxic respiratory failure: Monitor. Baseline 3.5 LNC.     CKD3. Monitor with diuresis.  Baseline Cr perhaps about 1.4, monitor.     Diabetes Mellitus, Type 2: Controlled. Continue basal-bolus Insulin regimen. Monitor blood sugar and adjust regimen as needed. Diabetic (Carb-controlled) diet when able. CAD.  Aspirin, statin, carvedilol, ISMN. Morbid Obesity -  With Body mass index is 41.79 kg/m². Complicating assessment and treatment.  Placing patient at risk for multiple co-morbidities as well as early death and contributing to the patient's presentation.     Incidental finding of small lung nodules.  Due to his smoking history, radiology recommended that he have another CT in 3-6 months.  Defer to PCP.     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      DVT Prophylaxis: Lovenox  Diet: Carb control  Code Status: Full  PT/OT Eval Status: NA  Dispo - 2-3 days     Keshav Fernandez MD

## 2022-03-08 LAB
ANION GAP SERPL CALCULATED.3IONS-SCNC: 11 MMOL/L (ref 3–16)
BUN BLDV-MCNC: 75 MG/DL (ref 7–20)
CALCIUM SERPL-MCNC: 9.2 MG/DL (ref 8.3–10.6)
CHLORIDE BLD-SCNC: 93 MMOL/L (ref 99–110)
CO2: 34 MMOL/L (ref 21–32)
CREAT SERPL-MCNC: 1.7 MG/DL (ref 0.8–1.3)
EKG ATRIAL RATE: 63 BPM
EKG DIAGNOSIS: NORMAL
EKG P AXIS: 151 DEGREES
EKG P-R INTERVAL: 184 MS
EKG Q-T INTERVAL: 438 MS
EKG QRS DURATION: 90 MS
EKG QTC CALCULATION (BAZETT): 448 MS
EKG R AXIS: -10 DEGREES
EKG T AXIS: 126 DEGREES
EKG VENTRICULAR RATE: 63 BPM
GFR AFRICAN AMERICAN: 49
GFR NON-AFRICAN AMERICAN: 40
GLUCOSE BLD-MCNC: 241 MG/DL (ref 70–99)
GLUCOSE BLD-MCNC: 246 MG/DL (ref 70–99)
GLUCOSE BLD-MCNC: 252 MG/DL (ref 70–99)
GLUCOSE BLD-MCNC: 275 MG/DL (ref 70–99)
GLUCOSE BLD-MCNC: 362 MG/DL (ref 70–99)
IRON SATURATION: 15 % (ref 20–50)
IRON: 37 UG/DL (ref 59–158)
MAGNESIUM: 2.4 MG/DL (ref 1.8–2.4)
PERFORMED ON: ABNORMAL
POTASSIUM SERPL-SCNC: 3.8 MMOL/L (ref 3.5–5.1)
SODIUM BLD-SCNC: 138 MMOL/L (ref 136–145)
TOTAL IRON BINDING CAPACITY: 249 UG/DL (ref 260–445)
TROPONIN: 0.05 NG/ML

## 2022-03-08 PROCEDURE — 6370000000 HC RX 637 (ALT 250 FOR IP): Performed by: INTERNAL MEDICINE

## 2022-03-08 PROCEDURE — 94761 N-INVAS EAR/PLS OXIMETRY MLT: CPT

## 2022-03-08 PROCEDURE — 2580000003 HC RX 258: Performed by: INTERNAL MEDICINE

## 2022-03-08 PROCEDURE — 2700000000 HC OXYGEN THERAPY PER DAY

## 2022-03-08 PROCEDURE — 93005 ELECTROCARDIOGRAM TRACING: CPT | Performed by: INTERNAL MEDICINE

## 2022-03-08 PROCEDURE — 6360000002 HC RX W HCPCS: Performed by: INTERNAL MEDICINE

## 2022-03-08 PROCEDURE — 36415 COLL VENOUS BLD VENIPUNCTURE: CPT

## 2022-03-08 PROCEDURE — 80048 BASIC METABOLIC PNL TOTAL CA: CPT

## 2022-03-08 PROCEDURE — 84484 ASSAY OF TROPONIN QUANT: CPT

## 2022-03-08 PROCEDURE — 83735 ASSAY OF MAGNESIUM: CPT

## 2022-03-08 PROCEDURE — 99231 SBSQ HOSP IP/OBS SF/LOW 25: CPT | Performed by: OTOLARYNGOLOGY

## 2022-03-08 PROCEDURE — 1200000000 HC SEMI PRIVATE

## 2022-03-08 PROCEDURE — 93010 ELECTROCARDIOGRAM REPORT: CPT | Performed by: INTERNAL MEDICINE

## 2022-03-08 PROCEDURE — 99222 1ST HOSP IP/OBS MODERATE 55: CPT | Performed by: INTERNAL MEDICINE

## 2022-03-08 RX ORDER — CARVEDILOL 6.25 MG/1
6.25 TABLET ORAL 2 TIMES DAILY WITH MEALS
Status: DISCONTINUED | OUTPATIENT
Start: 2022-03-08 | End: 2022-03-14 | Stop reason: HOSPADM

## 2022-03-08 RX ADMIN — SENNOSIDES 8.6 MG: 8.6 TABLET, COATED ORAL at 08:18

## 2022-03-08 RX ADMIN — INSULIN LISPRO 7 UNITS: 100 INJECTION, SOLUTION INTRAVENOUS; SUBCUTANEOUS at 20:44

## 2022-03-08 RX ADMIN — OXYCODONE AND ACETAMINOPHEN 1 TABLET: 5; 325 TABLET ORAL at 03:50

## 2022-03-08 RX ADMIN — INSULIN GLARGINE 60 UNITS: 100 INJECTION, SOLUTION SUBCUTANEOUS at 08:18

## 2022-03-08 RX ADMIN — INSULIN LISPRO 9 UNITS: 100 INJECTION, SOLUTION INTRAVENOUS; SUBCUTANEOUS at 11:28

## 2022-03-08 RX ADMIN — HYDRALAZINE HYDROCHLORIDE 10 MG: 10 TABLET, FILM COATED ORAL at 05:54

## 2022-03-08 RX ADMIN — CIPROFLOXACIN 4 DROP: 3 SOLUTION OPHTHALMIC at 08:18

## 2022-03-08 RX ADMIN — ALLOPURINOL 300 MG: 300 TABLET ORAL at 08:18

## 2022-03-08 RX ADMIN — CARVEDILOL 12.5 MG: 6.25 TABLET, FILM COATED ORAL at 08:18

## 2022-03-08 RX ADMIN — INSULIN LISPRO 6 UNITS: 100 INJECTION, SOLUTION INTRAVENOUS; SUBCUTANEOUS at 08:20

## 2022-03-08 RX ADMIN — AMOXICILLIN AND CLAVULANATE POTASSIUM 1 TABLET: 875; 125 TABLET, FILM COATED ORAL at 08:18

## 2022-03-08 RX ADMIN — SODIUM CHLORIDE, PRESERVATIVE FREE 10 ML: 5 INJECTION INTRAVENOUS at 08:19

## 2022-03-08 RX ADMIN — OXYCODONE AND ACETAMINOPHEN 1 TABLET: 5; 325 TABLET ORAL at 09:24

## 2022-03-08 RX ADMIN — ASPIRIN 81 MG 81 MG: 81 TABLET ORAL at 08:18

## 2022-03-08 RX ADMIN — INSULIN GLARGINE 60 UNITS: 100 INJECTION, SOLUTION SUBCUTANEOUS at 20:45

## 2022-03-08 RX ADMIN — OXYCODONE AND ACETAMINOPHEN 1 TABLET: 5; 325 TABLET ORAL at 17:31

## 2022-03-08 RX ADMIN — FUROSEMIDE 40 MG: 10 INJECTION, SOLUTION INTRAMUSCULAR; INTRAVENOUS at 08:18

## 2022-03-08 RX ADMIN — FUROSEMIDE 5 MG/HR: 10 INJECTION, SOLUTION INTRAMUSCULAR; INTRAVENOUS at 23:38

## 2022-03-08 RX ADMIN — PANTOPRAZOLE SODIUM 40 MG: 40 TABLET, DELAYED RELEASE ORAL at 08:18

## 2022-03-08 RX ADMIN — SENNOSIDES 8.6 MG: 8.6 TABLET, COATED ORAL at 20:43

## 2022-03-08 RX ADMIN — FUROSEMIDE 5 MG/HR: 10 INJECTION, SOLUTION INTRAMUSCULAR; INTRAVENOUS at 11:27

## 2022-03-08 RX ADMIN — INSULIN LISPRO 9 UNITS: 100 INJECTION, SOLUTION INTRAVENOUS; SUBCUTANEOUS at 17:31

## 2022-03-08 RX ADMIN — ENOXAPARIN SODIUM 40 MG: 40 INJECTION SUBCUTANEOUS at 08:18

## 2022-03-08 RX ADMIN — CARVEDILOL 6.25 MG: 6.25 TABLET, FILM COATED ORAL at 17:31

## 2022-03-08 RX ADMIN — ISOSORBIDE MONONITRATE 30 MG: 30 TABLET, EXTENDED RELEASE ORAL at 08:18

## 2022-03-08 RX ADMIN — ATORVASTATIN CALCIUM 40 MG: 40 TABLET, FILM COATED ORAL at 20:44

## 2022-03-08 ASSESSMENT — PAIN DESCRIPTION - PROGRESSION
CLINICAL_PROGRESSION: GRADUALLY WORSENING

## 2022-03-08 ASSESSMENT — PAIN SCALES - GENERAL
PAINLEVEL_OUTOF10: 2
PAINLEVEL_OUTOF10: 4
PAINLEVEL_OUTOF10: 7
PAINLEVEL_OUTOF10: 7
PAINLEVEL_OUTOF10: 3
PAINLEVEL_OUTOF10: 7

## 2022-03-08 ASSESSMENT — PAIN - FUNCTIONAL ASSESSMENT: PAIN_FUNCTIONAL_ASSESSMENT: ACTIVITIES ARE NOT PREVENTED

## 2022-03-08 NOTE — PLAN OF CARE
Problem: Falls - Risk of:  Goal: Will remain free from falls  Description: Will remain free from falls  Outcome: Ongoing     Problem: HEMODYNAMIC STATUS  Goal: Patient has stable vital signs and fluid balance  Outcome: Ongoing     Problem: Serum Glucose Level - Abnormal:  Goal: Ability to maintain appropriate glucose levels will improve  Description: Ability to maintain appropriate glucose levels will improve  Outcome: Ongoing         Patient's EF (Ejection Fraction) is greater than 40%    Heart Failure Medications:  Diuretics[de-identified] Furosemide    (One of the following REQUIRED for EF </= 40%/SYSTOLIC FAILURE but MAY be used in EF% >40%/DIASTOLIC FAILURE)        ACE[de-identified] None        ARB[de-identified] None         ARNI[de-identified] None    (Beta Blockers)  NON- Evidenced Based Beta Blocker (for EF% >40%/DIASTOLIC FAILURE): None    Evidenced Based Beta Blocker::(REQUIRED for EF% <40%/SYSTOLIC FAILURE) Carvedilol- Coreg  . .................................................................................................................................................. Patient's weights and intake/output reviewed: Yes    Patient's Last Weight: 293 lbs obtained by standing scale. Difference of 0 lbs less than last documented weight.       Intake/Output Summary (Last 24 hours) at 3/7/2022 2251  Last data filed at 3/7/2022 2110  Gross per 24 hour   Intake --   Output 350 ml   Net -350 ml       Comorbidities Reviewed Yes    Patient has a past medical history of Anemia, Angina, Arthritis, CAD (coronary artery disease), CHF (congestive heart failure) ([facility] CKD (chronic kidney disease) stage 3, GFR 30-59 ml/min ([facility]), Clostridium difficile diarrhea, Diabetes mellitus ([facility] Diabetic neuropathy ([address] Disease of blood and blood forming organ, Dizziness, GERD (gastroesophageal reflux disease), Headache, Hearing loss, Hyperlipidemia, Hypertension, Kidney stone, Refusal of blood transfusions as patient is [employer], Sleep apnea, Tinnitus, Venous ulcer ([facility] and Wound, open. >>For CHF and Comorbidity documentation on Education Time and Topics, please see Education Tab    Progressive Mobility Assessment:  What is this patient's Current Level of Mobility?: Ambulatory- with Assistance  How was this patient Mobilized today?: Edge of Bed, Up to Chair, Bedside Commode,  Up to Toilet/Shower, Up in Room and Up in Hallway, ambulated 5 ft                 With Whom? Nurse, PCA, PT and OT                 Level of Difficulty/Assistance: 1x Assist     Pt up in chair at this time on 4 L O2. Pt denies shortness of breath. Pt with pitting lower extremity edema.      Patient and/or Family's stated Goal of Care this Admission: reduce shortness of breath and better understand heart failure and disease management prior to discharge

## 2022-03-08 NOTE — ED PROVIDER NOTES
CHIEF COMPLAINT  Shortness of Breath (patient with dyspnea on exertion since last evening. Patient denies pain, home O2 level of 3.5lnc. )      HISTORY OF PRESENT ILLNESS  Shay Gallegos is a 78 y.o. male with a history of CABG, DM, HTN, CHF, CKD, anemia, morbid obesity who presents to the ED complaining of shortness of breath. Patient states he has experienced worsening shortness of breath for the past 2 weeks. States primarily he is dyspneic when ambulatory for short distances. He also reports a sensation of GERD and reports left ear pain. States he is currently being treated for left ear infection with oral antibiotics and eardrops but cannot recall what type. He denies chest pain, fever, nausea, vomiting, diarrhea, dysuria, abdominal pain. Has not taken anything prior to arrival.   No other complaints, modifying factors or associated symptoms. I have reviewed the following from the nursing documentation.     Past Medical History:   Diagnosis Date    Anemia     Angina     Arthritis     CAD (coronary artery disease)     [facility] failure) [facility]     CKD (chronic kidney disease) stage 3, GFR 30-59 ml/min ([facility])     Clostridium difficile diarrhea 3/16/12; 2/29/12    positive stool toxin    Diabetes mellitus ([address]     Diabetic neuropathy ([address]     feet and legs    Disease of blood and blood forming organ     Dizziness     When he moves his head/ loses his balance    GERD (gastroesophageal reflux disease)     gastric ulcer    Headache     Hearing loss     Hyperlipidemia     Hypertension     Kidney stone 2002    Refusal of blood transfusions as patient is Yazdanism     Sleep apnea     Tinnitus     Venous ulcer ([address]     LLE    Wound, open 1/13/2012     Past Surgical History:   Procedure Laterality Date    CARDIAC SURGERY      Dec 27 2010, triple bypass    CHOLECYSTECTOMY  9/2011    HERNIA REPAIR  age2    OTHER SURGICAL HISTORY  11-16-11 REPAIR LOWER STERNAL INCISION, REMOVAL OF ONE STERNAL WIRE,    OTHER SURGICAL HISTORY  10/10/2014    phacoemulsification of cataract with intraocular lens implant left eye    PAIN MANAGEMENT PROCEDURE N/A 2/10/2022    MIDLINE CERVICAL SIX SEVEN EPIDURAL STEROID INJECTION SITE CONFIRMED BY FLUOROSCOPY performed by Monica Saenz MD at [facility] OR    UPPER GASTROINTESTINAL ENDOSCOPY       Family History   Problem Relation Age of Onset    Heart Disease Mother     Heart Disease Father     Cancer Father     Diabetes Brother     Diabetes Brother      Social History     Socioeconomic History    Marital status: Single     Spouse name: Not on file    Number of children: Not on file    Years of education: Not on file    Highest education level: Not on file   Occupational History    Not on file   Tobacco Use    Smoking status: Former Smoker     Packs/day: 1.00     Years: 16.00     Pack years: 16.00     Quit date: 1/10/1988     Years since quittin.1    Smokeless tobacco: Never Used    Tobacco comment: 25 yrs ago   Vaping Use    Vaping Use: Never used   Substance and Sexual Activity    Alcohol use: No     Alcohol/week: 0.0 standard drinks    Drug use: No    Sexual activity: Not on file   Other Topics Concern    Not on file   Social History Narrative    Not on file     Social Determinants of Health     Financial Resource Strain:     Difficulty of Paying Living Expenses: Not on file   Food Insecurity:     Worried About [address] in the Last Year: Not on file    [address] in the Last Year: Not on file   Transportation Needs:     Lack of Transportation (Medical): Not on file    Lack of Transportation (Non-Medical):  Not on file   Physical Activity:     Days of Exercise per Week: Not on file    Minutes of Exercise per Session: Not on file   Stress:     Feeling of Stress : Not on file   Social Connections:     Frequency of Communication with Friends and Family: Not on file    Frequency of Social Gatherings with Friends and Family: Not on file    Attends Cheondoism Services: Not on file    Active Member of Clubs or Organizations: Not on file    Attends Club or Organization Meetings: Not on file    Marital Status: Not on file   Intimate Partner Violence:     Fear of Current or Ex-Partner: Not on file    Emotionally Abused: Not on file    Physically Abused: Not on file    Sexually Abused: Not on file   Housing Stability:     Unable to Pay for Housing in the Last Year: Not on file    Number of [place] in the Last Year: Not on file    Unstable Housing in the Last Year: Not on file     Current Facility-Administered Medications   Medication Dose Route Frequency Provider Last Rate Last Admin    [START ON 3/8/2022] allopurinol (ZYLOPRIM) tablet 300 mg  300 mg Oral Daily Kathyl Safe, MD        amoxicillin-clavulanate (AUGMENTIN) 875-125 MG per tablet 1 tablet  1 tablet Oral BID Osiris Sanderson MD        [START ON 3/8/2022] aspirin chewable tablet 81 mg  81 mg Oral Daily Cherl Safe, MD        atorvastatin (LIPITOR) tablet 40 mg  40 mg Oral Nightly Kathyl Safe, MD        carvedilol (COREG) tablet 12.5 mg  12.5 mg Oral BID WC Kathyl Safe, MD Ilia Askew ON 3/8/2022] colchicine (COLCRYS) tablet 0.6 mg  0.6 mg Oral Daily Cherl Safe, MD        hydrALAZINE (APRESOLINE) tablet 10 mg  10 mg Oral 3 times per day Cherl Safe, MD Ilia Askew ON 3/8/2022] isosorbide mononitrate (IMDUR) extended release tablet 30 mg  30 mg Oral Daily Cherl SafeMD Ilia ON 3/8/2022] pantoprazole (PROTONIX) tablet 40 mg  40 mg Oral QAM AC Osiris Sanderson, MD        oxyCODONE-acetaminophen (PERCOCET) 5-325 MG per tablet 1 tablet  1 tablet Oral Q6H PRN Kathyl Safe, MD [facility]) tablet 8.6 mg  1 tablet Oral BID Cherl Safe, MD        glucose (GLUTOSE) 40 % oral gel 15 g  15 g Oral PRN Kathyl Safe, MD        dextrose 50 % IV solution  12.5 g IntraVENous PRN Donny Schmidt MD        glucagon (rDNA) injection 1 mg  1 mg IntraMUSCular PRN Donny Schmidt MD        dextrose 5 % solution  100 mL/hr IntraVENous PRN Donny Schmidt MD        sodium chloride flush 0.9 % injection 5-40 mL  5-40 mL IntraVENous 2 times per day Donny Schmidt MD        sodium chloride flush 0.9 % injection 5-40 mL  5-40 mL IntraVENous PRN Donny Schmidt MD        0.9 % sodium chloride infusion  25 mL IntraVENous PRN Donny Schmidt MD        ondansetron (ZOFRAN-ODT) disintegrating tablet 4 mg  4 mg Oral Q8H PRN Donny Schmidt MD        Or    [facility] injection 4 mg  4 mg IntraVENous Q6H PRN Donny Schmidt MD        polyethylene glycol Karilyn Freeman) packet 17 g  17 g Oral Daily PRN Donny Schmidt MD        acetaminophen (TYLENOL) tablet 650 mg  650 mg Oral Q6H PRN Donny Schmidt MD        Or    acetaminophen (TYLENOL) suppository 650 mg  650 mg Rectal Q6H PRN MD Leonid Ruiz ON 3/8/2022] enoxaparin (LOVENOX) injection 40 mg  40 mg SubCUTAneous Daily Donny Schmidt MD        insulin glargine (LANTUS) injection vial 60 Units  60 Units SubCUTAneous BID MD Leonid Ruiz ON 3/8/2022] insulin lispro (HUMALOG) injection vial 0-18 Units  0-18 Units SubCUTAneous TID WC Donny Schmidt MD        insulin lispro (HUMALOG) injection vial 0-9 Units  0-9 Units SubCUTAneous Nightly MD Dea Ruiz ON 3/8/2022] furosemide (LASIX) injection 40 mg  40 mg IntraVENous BID Donny Schmidt MD        ciprofloxacin (CILOXAN) 0.3 % ophthalmic solution 4 drop  4 drop Left Ear BID Donny Schmidt MD         Allergies   Allergen Reactions    Amitriptyline Other (See Comments)     tremor    Celecoxib      Hyperkalemia    Cymbalta [Duloxetine Hcl] Other (See Comments)     Tremors and slurred speech    Elavil [Amitriptyline Hcl]      tremor    Gabapentin      Tremor at high dose    Nsaids Gastric ulcer       REVIEW OF SYSTEMS  10 systems reviewed, pertinent positives per HPI otherwise noted to be negative. PHYSICAL EXAM  /64   Pulse 61   Temp 98.3 °F (36.8 °C) (Oral)   Resp 18   Ht 5' 9\" (1.753 m)   Wt 293 lb 6.4 oz (133.1 kg)   SpO2 91%   BMI 43.33 kg/m²   GENERAL APPEARANCE: Awake and alert. Cooperative. Appears chronically ill, morbidly obese, dyspneic when moving around the room. HEAD: Normocephalic. Atraumatic. EYES: PERRL. EOM's grossly intact. ENT: Mucous membranes are moist.  Erythema around the margins of the left TM with moisture within the ear canal and significant pain with exam.  NECK: Supple, trachea midline. HEART: RRR. 2 out of 6 systolic murmur. LUNGS: Respirations unlabored. Moderate air movement with bibasilar rales. No wheezing or rhonchi. Speaking comfortably in full sentences. ABDOMEN: Soft. Non-distended. Non-tender. No guarding or rebound. Normal Bowel sounds. EXTREMITIES: 2+ peripheral edema. MAEE. No acute deformities. SKIN: Warm and dry. No acute rashes. NEUROLOGICAL: Alert and oriented X 3. CN II-XII intact. No gross facial drooping. Strength symmetrical.   PSYCHIATRIC: Normal mood and affect. LABS  I have reviewed all labs for this visit.    Results for orders placed or performed during the hospital encounter of 03/07/22   COVID-19, Rapid    Specimen: Nasopharyngeal Swab   Result Value Ref Range    SARS-CoV-2, NAAT Not Detected Not Detected   CBC with Auto Differential   Result Value Ref Range    WBC 7.8 4.0 - 11.0 K/uL    RBC 3.72 (L) 4.20 - 5.90 M/uL    Hemoglobin 10.6 (L) 13.5 - 17.5 g/dL    Hematocrit 32.1 (L) 40.5 - 52.5 %    MCV 86.3 80.0 - 100.0 fL    MCH 28.6 26.0 - 34.0 pg    MCHC 33.1 31.0 - 36.0 g/dL    RDW 17.2 (H) 12.4 - 15.4 %    Platelets 722 456 - 874 K/uL    MPV 7.7 5.0 - 10.5 fL    Neutrophils % 86.4 %    Lymphocytes % 5.2 %    Monocytes % 6.1 %    Eosinophils % 1.8 %    Basophils % 0.5 %    Neutrophils Absolute 6.7 1.7 - 7.7 K/uL    Lymphocytes Absolute 0.4 (L) 1.0 - 5.1 K/uL    Monocytes Absolute 0.5 0.0 - 1.3 K/uL    Eosinophils Absolute 0.1 0.0 - 0.6 K/uL    Basophils Absolute 0.0 0.0 - 0.2 K/uL   Comprehensive Metabolic Panel w/ Reflex to MG   Result Value Ref Range    Sodium 134 (L) 136 - 145 mmol/L    Potassium reflex Magnesium 3.7 3.5 - 5.1 mmol/L    Chloride 88 (L) 99 - 110 mmol/L    CO2 31 21 - 32 mmol/L    Anion Gap 15 3 - 16    Glucose 268 (H) 70 - 99 mg/dL    BUN 76 (H) 7 - 20 mg/dL    CREATININE 1.7 (H) 0.8 - 1.3 mg/dL    GFR Non- 40 (A) >60    GFR  49 (A) >60    Calcium 9.2 8.3 - 10.6 mg/dL    Total Protein 7.0 6.4 - 8.2 g/dL    Albumin 4.1 3.4 - 5.0 g/dL    Albumin/Globulin Ratio 1.4 1.1 - 2.2    Total Bilirubin 0.4 0.0 - 1.0 mg/dL    Alkaline Phosphatase 109 40 - 129 U/L    ALT 12 10 - 40 U/L    AST 14 (L) 15 - 37 U/L   Troponin   Result Value Ref Range    Troponin 0.05 (H) <0.01 ng/mL   Brain Natriuretic Peptide   Result Value Ref Range    Pro-BNP 1,723 (H) 0 - 124 pg/mL   Blood Gas, Venous   Result Value Ref Range    pH, Angel 7.401 7.350 - 7.450    pCO2, Angel 54.9 (H) 40.0 - 50.0 mmHg    pO2, Angel 58.8 (H) 25.0 - 40.0 mmHg    HCO3, Venous 33.3 (H) 23.0 - 29.0 mmol/L    Base Excess, Angel 7.2 (H) -3.0 - 3.0 mmol/L    O2 Sat, Angel 89 Not Established %    Carboxyhemoglobin 3.0 (H) 0.0 - 1.5 %    MetHgb, Angel 0.5 <1.5 %    TC02 (Calc), Angel 35 Not Established mmol/L    O2 Therapy Unknown    Lactic Acid   Result Value Ref Range    Lactic Acid 0.9 0.4 - 2.0 mmol/L   Procalcitonin   Result Value Ref Range    Procalcitonin 0.30 (H) 0.00 - 0.15 ng/mL   EKG 12 Lead   Result Value Ref Range    Ventricular Rate 60 BPM    Atrial Rate 60 BPM    P-R Interval 188 ms    QRS Duration 88 ms    Q-T Interval 424 ms    QTc Calculation (Bazett) 424 ms    P Axis 113 degrees    R Axis -54 degrees    T Axis 117 degrees    Diagnosis       Sinus rhythm with marked sinus arrhythmiaLeft axis deviationPulmonary disease patternST & T wave abnormality, consider lateral ischemiaAbnormal ECGConfirmed by Salome Saenz MD, Elvis Burrell ([id number]) on 3/7/2022 3:58:54 PM       EKG  NSR, rate 60, leftward axis, QTC within normal limits, no acute ST or T wave changes from prior on 1/13/2022      RADIOLOGY  X-RAYS:  I have reviewed radiologic plain film image(s). ALL OTHER NON-PLAIN FILM IMAGES SUCH AS CT, ULTRASOUND AND MRI HAVE BEEN READ BY THE RADIOLOGIST. XR CHEST PORTABLE   Final Result   Diffuse airspace opacities consistent with multifocal pneumonia. Stable cardiomegaly. Rechecks: Physical assessment performed. Unchanged. ED COURSE/MDM  Patient seen and evaluated. Old records reviewed. Labs and imaging reviewed and results discussed with patient. 57-year-old male with multiple medical conditions is here with worsening dyspnea on exertion and a sensation of GERD. Patient also with left otitis media. Work-up suggestive of congestive heart failure exacerbation. Chest x-ray raises the possibility of multifocal pneumonia however he has no leukocytosis or fever. Treatment initiated with Lasix and patient also covered with antibiotics to cover his lungs and his OTM. Plan for hospitalist admission. Current Discharge Medication List          CLINICAL IMPRESSION  1. Acute on chronic congestive heart failure, unspecified heart failure type ([address]    2. TAYLOR (dyspnea on exertion)    3. Acute otitis media, unspecified otitis media type        Blood pressure 121/64, pulse 61, temperature 98.3 °F (36.8 °C), temperature source Oral, resp. rate 18, height 5' 9\" (1.753 m), weight 293 lb 6.4 oz (133.1 kg), SpO2 91 %. DISPOSITION  Shiloh Madrigal was admitted in stable condition.         Osiris Godwin MD  03/07/22 2019

## 2022-03-08 NOTE — CARE COORDINATION
CASE MANAGEMENT INITIAL ASSESSMENT      Reviewed chart and completed assessment with patient:at bedside  Explained Case Management role/services. Primary contact information:see below    Health Care Decision Maker :   Primary Decision Maker: Rumalda Skiff - [place] - [phone]          Can this person be reached and be able to respond quickly, such as within a few minutes or hours? Yes  Admit date/status:03/07/222  Diagnosis:TAYLOR   Is this a Readmission?:  No      Insurance:[employer] Medicare   Precert required for SNF: Yes       3 night stay required: No    Living arrangements, Adls, care needs, prior to admission:Pt lives alone in an apartment w/2 VENITA, has assistance w/ADL's, friends drive    Transportation:private     Durable Medical Equipment at home:  Walker_x_Cane_x_RTS__ BSC__Shower Chair__  02_x 3.5 L [employer]_ HHN__ CPAP__  BiPap__  Hospital Bed__ W/C___ Other__________    · Services in the home and/or outpatient, prior to admission:[employer] 3/5 L O2, [facility] non skilled; [facility] skilled    PT/OT recs:none    [address] Notification (HEN):not initiated    Barriers to discharge:none    Plan/comments:Pt plans to d/c home w/services in place, but is interested in transitioning to an AL, possibly. Pt has a Medicaid  who is working on this at this time for him. CM will continue to follow for needs and will assist as needed.   Jelly Simmons RN       ECOC on chart for MD signature

## 2022-03-08 NOTE — PROGRESS NOTES
Patient admitted to room 206 from ER. Patient oriented to room, call light, bed rails, phone, lights and bathroom. Patient instructed about the schedule of the day including: vital sign frequency, lab draws, possible tests, frequency of MD and staff rounds, including RN/MD rounding together at bedside, daily weights, and I &O's. Patient instructed about prescribed diet, how to use television. bed alarm in place, patient aware of placement and reason. Telemetry box in place, patient aware of placement and reason. Bed locked, in lowest position, side rails up 2/4, call light within reach. Will continue to monitor.

## 2022-03-08 NOTE — PROGRESS NOTES
Hospitalist Progress Note    Date of Admission: 3/7/2022    Chief Complaint: SOB    Hospital Course:   78 y.o. male who presented to [facility] with SOB. PMHx significant for former smoking, HTN, HLD, DM2, CAD s/p CABG in 2010, and dCHF. He has had recurrent hospitalizations for CHF, most recently 1/2022. He reports compliance with his diuretic regimen of Torsemide, Aldactone and Metolazone. He reports progressive increase in SOB/TAYLOR as well as worsening LE edema. He also notes recent issues with left ear infection. He was initially started on Cipro drops and later Augmentin (3/2/22) by ENT Dr. Patricia Santizo. He is concerned that the ear infection has not resolved as he has continued pain     Subjective: Still with some SOB at rest. Ongoing LE edema. No chest pain. Left ear pain. Labs:   Recent Labs     03/07/22  1457   WBC 7.8   HGB 10.6*   HCT 32.1*        Recent Labs     03/07/22  1457 03/08/22  0710   * 138   K 3.7 3.8   CL 88* 93*   CO2 31 34*   BUN 76* 75*   CREATININE 1.7* 1.7*   CALCIUM 9.2 9.2     Recent Labs     03/07/22  1457   AST 14*   ALT 12   BILITOT 0.4   ALKPHOS 109     No results for input(s): INR in the last 72 hours. Physical Exam Performed:    BP (!) 121/57   Pulse 60   Temp 97.9 °F (36.6 °C) (Oral)   Resp 16   Ht 5' 9\" (1.753 m)   Wt 291 lb (132 kg)   SpO2 (!) 87%   BMI 42.97 kg/m²   General appearance:  No apparent distress, appears stated age and cooperative. Morbidly obese. HEENT:  Pupils equal, round, and reactive to light. Conjunctivae/corneas clear. Neck:  Supple, no jugular venous distention. Trachea midline with full range of motion. Respiratory:  Normal respiratory effort. Diminished but clear to auscultation, bilaterally without Rales/Wheezes/Rhonchi. Cardiovascular:  Regular rate and rhythm with normal S1/S2 without murmurs, rubs or gallops. Abdomen:  Soft, non-tender, non-distended with normal bowel sounds.   Musculoskelatal:  No clubbing, cyanosis. Tight LE edema bilaterally. Full range of motion without deformity. Neurologic:  Neurovascularly intact without any focal sensory/motor deficits. Cranial nerves: II-XII intact, grossly non-focal.  Psychiatric:  Alert and oriented, thought content appropriate, normal insight  Skin:  Skin color, texture, turgor normal.  No rashes or lesions. Capillary Refill:  Brisk,< 3 seconds   Peripheral Pulses:  +2 palpable, equal bilaterally     Assessment/Plan:    Active Hospital Problems    Diagnosis     Acute on chronic diastolic CHF (congestive heart failure) ([facility]) [I50.33]     Morbid obesity ([facility] [E66.01]     Essential hypertension [I10]     DM (diabetes mellitus) ([facility] [E11.9]      Acute on chronic diastolic CHF (congestive heart failure): Recurrent admissions for same, most recently 1/2022. Worsening dyspnea, LE edema, elevated BNP. CXR findings most consistent with CHF as appearance is similar to some of his recent admissions for CHF. Procal is borderline elevated but in setting of CKD. Do not feel he needs further Abx for Pneumonia, which has been ruled out. Cardiology following. Continue IV Diuresis with Lasix drip. Monitor I/O, weights, BMP. Plan for NM Stress test.     L Ear Otitis: He reports persistent left ear pain despite recent course of topical Cipro and PO Augmentin (started 3/2/22 by Dr. Carolyn Glaser). Consulted ENT for input. Chronic hypoxic respiratory failure: Monitor. Baseline 3.5 LNC.     CKD3. Near baseline. Monitor with diuresis.  Monitor.     Diabetes Mellitus, Type 2: Controlled. Continue basal-bolus Insulin regimen. Monitor blood sugar and adjust regimen as needed. Diabetic (Carb-controlled) diet when able. CAD.  Aspirin, statin, carvedilol, ISMN. Morbid Obesity -  With Body mass index is 42.97 kg/m². Complicating assessment and treatment.  Placing patient at risk for multiple co-morbidities as well as early death and contributing to the patient's presentation.     Incidental finding of small lung nodules.  Due to his smoking history, radiology recommended that he have another CT in 3-6 months.  Defer to PCP.     Of note, he is Jehova's witness and does not want transfusions.      DVT Prophylaxis: Lovenox  Diet: ADULT DIET; Regular; 4 carb choices (60 gm/meal);  Low Sodium (2 gm)  Code Status: Full Code  PT/OT Eval Status: Pending course    Dispo - 2-3 days at least    Radha Redd MD

## 2022-03-08 NOTE — CONSULTS
Consult placed    Who: Dr. Hilliard Scales AM        Electronically signed by Prudence Harris on 3/8/2022 at 8:29 AM

## 2022-03-08 NOTE — CONSULTS
[facility]      Patient Name: Rahul Irby  Medical Record Number:  [id number]  Primary Care Physician:  Hannah Galvez, APRN - CNP  Date of Consultation: 3/8/2022    Chief Complaint: Left ear otalgia    HISTORY OF Fanta Richardson is a(n) 78 y.o. male who presents to the hospital with CHF exacerbation, with left ear otalgia - no improvement with antibiotic eardrops, Augmentin.      Patient Active Problem List   Diagnosis    DM (diabetes mellitus) ([address]    Coronary artery disease involving native coronary artery of native heart without angina pectoris    Anemia of chronic disease    Essential hypertension    Hyperkalemia    Chronic diastolic heart failure ([address]    Pulmonary hypertension due to left ventricular diastolic dysfunction ([address]    Morbid obesity ([address]    S/P CABG x 3    DM2 (diabetes mellitus, type 2) ([address]    Morbid obesity with BMI of 50.0-59.9, adult ([address]    HLD (hyperlipidemia)    Cardiomyopathy ([address]    Productive cough    Chronic pain    Severe obstructive sleep apnea    Obesity, Class III, BMI 40-49.9 (morbid obesity) (HCC)    Acute diastolic congestive heart failure (HCC)    Degeneration of lumbar or lumbosacral intervertebral disc    Displacement of lumbar intervertebral disc without myelopathy    Lumbosacral spondylosis without myelopathy    Lumbar facet arthropathy    Disc displacement, lumbar    Spinal stenosis of lumbar region    Mixed conductive and sensorineural hearing loss of left ear with restricted hearing of right ear    Tympanic membrane perforation, left    Chest pain    Cor pulmonale, chronic ([address]    Pulmonary hypertension due to alveolar hypoventilation disorder (HCC)    Nonrheumatic aortic valve stenosis    Chronic neck pain    Dyspnea    Acute diastolic CHF (congestive heart failure) (HCC)    Cervical stenosis of spinal canal    Degeneration of cervical intervertebral disc  Cervical radiculitis    Acute on chronic diastolic CHF (congestive heart failure) [facility])     Past Surgical History:   Procedure Laterality Date   Ilia Noble CARDIAC SURGERY      Dec 27 2010, triple bypass    CHOLECYSTECTOMY  2011    HERNIA REPAIR  age2    OTHER SURGICAL HISTORY  11 REPAIR LOWER STERNAL INCISION, REMOVAL OF ONE STERNAL WIRE,    OTHER SURGICAL HISTORY  10/10/2014    phacoemulsification of cataract with intraocular lens implant left eye    PAIN MANAGEMENT PROCEDURE N/A 2/10/2022    MIDLINE CERVICAL SIX SEVEN EPIDURAL STEROID INJECTION SITE CONFIRMED BY FLUOROSCOPY performed by Kamaljit Russell MD at [address] [facility] OR    UPPER GASTROINTESTINAL ENDOSCOPY       Family History   Problem Relation Age of Onset    Heart Disease Mother     Heart Disease Father     Cancer Father     Diabetes Brother     Diabetes Brother      Social History     Socioeconomic History    Marital status: Single     Spouse name: Not on file    Number of children: Not on file    Years of education: Not on file    Highest education level: Not on file   Occupational History    Not on file   Tobacco Use    Smoking status: Former Smoker     Packs/day: 1.00     Years: 16.00     Pack years: 16.00     Quit date: 1/10/1988     Years since quittin.1    Smokeless tobacco: Never Used    Tobacco comment: 25 yrs ago   Vaping Use    Vaping Use: Never used   Substance and Sexual Activity    Alcohol use: No     Alcohol/week: 0.0 standard drinks    Drug use: No    Sexual activity: Not on file   Other Topics Concern    Not on file   Social History Narrative    Not on file     Social Determinants of Health     Financial Resource Strain:     Difficulty of Paying Living Expenses: Not on file   Food Insecurity:     Worried About [address] in the Last Year: Not on file    James of Food in the Last Year: Not on file   Transportation Needs:     Lack of Transportation (Medical):  Not on file    Lack of Transportation (Non-Medical): Not on file   Physical Activity:     Days of Exercise per Week: Not on file    Minutes of Exercise per Session: Not on file   Stress:     Feeling of Stress : Not on file   Social Connections:     Frequency of Communication with Friends and Family: Not on file    Frequency of Social Gatherings with Friends and Family: Not on file    Attends Religion Services: Not on file    Active Member of [address] [place] [address] or Organizations: Not on file    Attends Club or Organization Meetings: Not on file    Marital Status: Not on file   Intimate Partner Violence:     Fear of Current or Ex-Partner: Not on file    Emotionally Abused: Not on file    Physically Abused: Not on file    Sexually Abused: Not on file   Housing Stability:     Unable to Pay for Housing in the Last Year: Not on file    Number of [place] in the Last Year: Not on file    Unstable Housing in the Last Year: Not on file       DRUG/FOOD ALLERGIES: Amitriptyline, Celecoxib, Cymbalta [duloxetine hcl], Elavil [amitriptyline hcl], Gabapentin, and Nsaids    CURRENT MEDICATIONS  Prior to Admission medications    Medication Sig Start Date End Date Taking? Authorizing Provider   amoxicillin-clavulanate (AUGMENTIN) 875-125 MG per tablet Take 1 tablet by mouth 2 times daily for 7 days 3/2/22 3/9/22  Wesley Ramos MD   ciprofloxacin (CILOXAN) 0.3 % ophthalmic solution Apply 4 drops twice daily to the left ear 3/1/22   Wesley Shoulders, MD   aspirin-acetaminophen-caffeine (EXCEDRIN MIGRAINE) 978-286-56 MG per tablet Take 1 tablet by mouth every 6 hours as needed for Headaches    Historical Provider, MD   oxyCODONE-acetaminophen (PERCOCET) 5-325 MG per tablet Take 1 tablet by mouth 4 times daily for 30 days.  2/28/22 3/30/22  Para Payer, APRN - CNP   hydrALAZINE (APRESOLINE) 10 MG tablet Take 1 tablet by mouth every 8 hours 1/17/22   Santos Newton MD   torsemide (DEMADEX) 100 MG tablet Take half tablet twice per day, but on days when your weight is above 282 lbs, take a full tablet twice per day. 1/17/22   Radha Melton MD   naloxone 4 MG/0.1ML LIQD nasal spray 1 spray by Nasal route as needed for Opioid Reversal 11/23/21   Yvonne العلي MD   carvedilol (COREG) 12.5 MG tablet Take 1 tablet by mouth 2 times daily (with meals) 8/31/21   PATRICK Rawls CNP   spironolactone (ALDACTONE) 25 MG tablet Take 1 tablet by mouth daily 9/1/21   PATRICK Rawls CNP   metOLazone (ZAROXOLYN) 2.5 MG tablet Take 1 tablet by mouth every other day 8/31/21   PATRICK Rawls CNP   atorvastatin (LIPITOR) 40 MG tablet Take 1 tablet by mouth nightly 8/31/21   PATRICK Rawls CNP   linaCLOtide (LINZESS PO) Take by mouth    Historical Provider, MD   clotrimazole (LOTRIMIN) 1 % external solution Apply 4 drops to the left ear twice daily (morning and evening) for 10 days 10/5/20   Kelly Mauricio MD   acetaminophen (TYLENOL) 500 MG tablet Take 2 tablets by mouth 4 times daily as needed for Pain 9/17/20   PATRICK Holcomb CNP   hydrocortisone (ANUSOL-HC) 2.5 % rectal cream Place rectally 2 times daily.  8/7/19   PATRICK Mosqueda CNP   senna (SENOKOT) 8.6 MG TABS tablet Take 1 tablet by mouth 2 times daily 7/8/19   PATRICK Medeiros CNP   colchicine (COLCRYS) 0.6 MG tablet Take 1 tablet by mouth daily 5/2/18   PATRICK Little CNP   docusate sodium (COLACE, DULCOLAX) 100 MG CAPS Take 100 mg by mouth 2 times daily 5/1/18   PATRICK Little CNP   insulin aspart (NOVOLOG) 100 UNIT/ML injection vial Inject into the skin 3 times daily (before meals) 50 units with breakfast, 20 units with lunch and 60 units with dinner    Historical Provider, MD   allopurinol (ZYLOPRIM) 100 MG tablet Take 300 mg by mouth     Historical Provider, MD   insulin glargine (LANTUS) 100 UNIT/ML injection vial Inject 55 Units into the skin 2 times daily  Patient taking differently: Inject 80 Units into the skin 2 times daily  1/15/17 Aquiles Portillo MD   Compression Stockings MISC by Does not apply route 2 pair, knee high compression stockings, fitted/ zippered 6/15/16   PATRICK Marroquin CNP   silver sulfADIAZINE (SILVADENE) 1 % cream Apply to BL lower leg ulcers daily 2/9/16   Anabel Green MD   nitroGLYCERIN (NITROSTAT) 0.4 MG SL tablet Place 1 tablet under the tongue every 5 minutes as needed 8/17/15   PATRICK Marroquin CNP   fluticasone (FLONASE) 50 MCG/ACT nasal spray 1 spray by Nasal route nightly as needed. Historical Provider, MD   aspirin 81 MG chewable tablet Take 1 tablet by mouth daily. 1/11/13   Aym Phillips MD   isosorbide mononitrate (IMDUR) 30 MG CR tablet Take 1 tablet by mouth daily. 1/11/13   Amy Phillips MD   ferrous sulfate 325 (65 FE) MG tablet Take 325 mg by mouth 3 times daily (with meals). 12/22/10   Historical Provider, MD   omeprazole (PRILOSEC) 20 MG capsule Take 20 mg by mouth 2 times daily. 12/22/10   Historical Provider, MD       PHYSICAL EXAM  BP (!) 112/59   Pulse 53   Temp 97.6 °F (36.4 °C) (Oral)   Resp 20   Ht 5' 9\" (1.753 m)   Wt 291 lb (132 kg)   SpO2 93%   BMI 42.97 kg/m²     GENERAL: No Acute Distress, Alert and Oriented, no hoarseness  EYES: EOMI, Anti-icteric  NOSE: No epistaxis, nasal mucosa within normal limits, no purulent drainage  EARS: Left preauricular pain - fungal hyphae/debris noted in the left EAC.  Right EAC clear, TM intact, no middle ear effusions  FACE: 1/6 House-Brackmann Scale, symmetric, sensation equal bilaterally    LABS  Lab Results   Component Value Date    WBC 7.8 03/07/2022    HGB 10.6 (L) 03/07/2022    HCT 32.1 (L) 03/07/2022    MCV 86.3 03/07/2022     03/07/2022     Lab Results   Component Value Date     03/08/2022    K 3.8 03/08/2022    K 3.7 03/07/2022    CL 93 03/08/2022    CO2 34 03/08/2022    BUN 75 03/08/2022    CREATININE 1.7 03/08/2022    GLUCOSE 246 03/08/2022    CALCIUM 9.2 03/08/2022        ASSESSMENT/PLAN  Viola Blanca is a very pleasant 78 y.o. male with left acute fungal otitis externa  -Stop antibiotic ear drops, Augmentin  -Start antifungal ear drops  -Will debride ears 3/9/22       I have performed a head and neck physical exam personally or was physically present during the key or critical portions of the service. Medical Decision Making:   The following items were considered in medical decision making:  Independent review of images  Review / order clinical lab tests  Review / order radiology tests  Decision to obtain old records  Review and summation of old records as accessed through [facility] (a summary of my findings in these old records: none)

## 2022-03-08 NOTE — ACP (ADVANCE CARE PLANNING)
Advance Care Planning     General Advance Care Planning (ACP) Conversation    Date of Conversation: 3/7/2022  Conducted with: Patient with Decision Making Capacity    Healthcare Decision Maker:    Primary Decision Maker: Beatriz Harris - Other - [phone]    Primary Decision Maker: Lisa Bishop - Other - [phone]  Click here to complete [address] including selection of the Healthcare Decision Maker Relationship (ie \"Primary\"). Today we discussed Healthcare Decision Makers. The patient is considering options.     Content/Action Overview:  Has NO ACP documents/care preferences - information provided, considering goals and options  Reviewed DNR/DNI and patient elects Full Code (Attempt Resuscitation)    Patient does not want any blood products    Length of Voluntary ACP Conversation in minutes:  <16 minutes (Non-Billable)    Debora Silverio RN

## 2022-03-08 NOTE — CONSULTS
[address] [address]                                  CONSULTATION    PATIENT NAME: Chaitanya KILLIAN                    :        1955  MED REC NO:   [id number]                          ROOM:       0206  ACCOUNT NO:   [de-identified]                           ADMIT DATE: 2022  PROVIDER:     Silke Craft. Celestine Bhat MD    CONSULT DATE:  2022    REASON FOR CONSULTATION:  Shortness of breath, congestive heart failure. HISTORY OF PRESENT ILLNESS:  A 58-year-old male presented to the  hospital with chief complaint of shortness of breath. He has chronic  shortness of breath, which waxes and wanes in severity. Over the past  several days prior to admission, it gotten significantly worse,  similarly he has had congestive heart failure in the past.  He has  associated weight gain and increased swelling. No chest discomfort. He  feels a little bit better since admission. He has had improvement with  diuretics. The shortness of breath is constant, worse with any  activity, worse when he lays down. PAST MEDICAL HISTORY:  1. Chronic diastolic congestive heart failure. Most recent ejection  fraction 50% by echocardiogram in 2022.  2.  Coronary artery disease, status post coronary artery bypass surgery  in , most recent cardiac catheterization in  showed patent  graft. 3.  Diabetes. 4.  Hypertension. 5.  Hyperlipidemia. 6.  Chronic kidney disease. 7.  Obesity. 8.  Aortic stenosis, moderate. 9.  History of atrial arrhythmias. 10.  Sleep apnea. 11.  Anemia. SOCIAL HISTORY:  He is a former smoker. He is not . FAMILY HISTORY:  Positive for heart disease, cancer, and diabetes. REVIEW OF SYSTEMS:  He complains of severe left ear pain. No fevers or  chills. No cough. No focal neurologic symptoms. No headache or visual  changes. No recent GI or  bleeding.   No recent or upcoming surgeries. All other systems are negative except as present illness. ALLERGIES:  See list in the chart, which I have reviewed. MEDICATIONS:  See list in the chart, which I have reviewed. PHYSICAL EXAMINATION:  VITALS:  Blood pressure is 146/72, heart rate 67, respirations 18, and  temperature 97.6. He is 92% saturated on oxygen. GENERAL:  Morbidly obese white male, in no acute distress. HEENT:  Normocephalic and atraumatic. Oropharynx clear. Moist mucous  membranes. NECK:  Supple. CHEST:  Rales. CARDIAC:  Regular S1 and S2. There is no S3 or S4 gallop. There is  systolic murmur. Jugular venous pressure is elevated. ABDOMEN:  Soft and nontender. Positive bowel sounds. EXTREMITIES:  Edematous. NEUROLOGIC:  Grossly nonfocal.  SKIN:  Warm and dry. PSYCHIATRIC:  Affect calm. Peripheral pulses intact. DIAGNOSTIC DATA:  Telemetry normal sinus rhythm. Creatinine 1.7,  troponin 0.05. ProBNP 1723. Chest x-ray airspace disease. EKG, sinus  rhythm. Left axis deviation. ST and T-wave abnormalities. COVID not  detected. IMPRESSION:  1. Shortness of breath - multifactorial including pulmonary edema. 2.  Acute on chronic diastolic congestive heart failure. Ejection  fraction 50% by echocardiogram in 01/2022. 3.  Coronary artery disease status post coronary artery bypass surgery  in 2010. Most recent ischemic evaluation in 2012 was cardiac catheterization that showed patent grafts. 4.  Hypertension, elevated this morning, but overall seems controlled. We will continue to monitor. 5.  Diabetes. 6.  Hyperlipidemia. 7.  Chronic kidney disease. 8.  Morbid obesity. 9.  Otitis. 10.  Aortic stenosis, moderate. 11.  History of atrial arrhythmias. 12.  Obstructive sleep apnea. 13.  Anemia. 14.  Elevated troponin, likely due to supply-demand imbalance  exacerbated by acute on chronic kidney disease. RECOMMENDATIONS:  1.   Diuresis, we will change IV push Lasix to continuous Lasix drip.  2.  Monitor labs daily. 3.  Lexiscan Myoview stress test.        EDIN Piña MD    D: 03/08/2022 9:36:17       T: 03/08/2022 10:49:45     [id number]  Job#: [id number]     Doc#: [id number]    CC:

## 2022-03-08 NOTE — PLAN OF CARE
Problem: Falls - Risk of:  Goal: Will remain free from falls  Description: Will remain free from falls  3/8/2022 0859 by Andrea Osborne RN  Outcome: Ongoing     Problem: Pain:  Goal: Pain level will decrease  Description: Pain level will decrease  Outcome: Ongoing     Problem: HEMODYNAMIC STATUS  Goal: Patient has stable vital signs and fluid balance  3/8/2022 0859 by Andrea Osborne RN  Outcome: Ongoing     Problem: OXYGENATION/RESPIRATORY FUNCTION  Goal: Patient will maintain patent airway  Outcome: Ongoing     Problem: Serum Glucose Level - Abnormal:  Goal: Ability to maintain appropriate glucose levels will improve  Description: Ability to maintain appropriate glucose levels will improve  3/7/2022 2252 by Sohail Wall RN  Outcome: Ongoing

## 2022-03-08 NOTE — PLAN OF CARE
Difficulty/Assistance: stand by     Pt up in chair at this time on 4 L O2. Pt denies shortness of breath. Pt with pitting lower extremity edema. Patient and/or Family's stated Goal of Care this Admission: reduce shortness of breath, increase activity tolerance, better understand heart failure and disease management and reduce lower extremity edema prior to discharge.

## 2022-03-08 NOTE — CONSULTS
Nutrition Education    Consulted for CHF diet education. Provided pt with written and verbal instruction on HF nutrition therapy. Discussed low sodium diet. Pt distracted this visit, d/t ear pain. Will further attempt diet education on a later date. Pt frequently eating high sodium canned meats d/t difficulty standing to cook. RD to create lower sodium canned protein options to assist with decreasing sodium intakes. Will follow. Time spent: 5 minutes      · Verbally reviewed information with Patient  · Educated on CHF  · Written educational materials provided. · Contact name and number provided. · Refer to Patient Education activity for more details.     Electronically signed by Vanessa Back, MS, RD, LD on 3/8/22 at 2:29 PM EST    Contact: [phone]

## 2022-03-08 NOTE — CONSULTS
[id number]    SOB  Elev trop  CAD  CABG 2010  Cath 2012 patent grafts   DM  HTN  HLD  C dCHF, EF 50% 1/2022  CKD  Obesity  Otitis  AS - mod  Atrial arrhythmia  HENNA  Anemia    Diuresis  Stress test

## 2022-03-09 ENCOUNTER — APPOINTMENT (OUTPATIENT)
Dept: NUCLEAR MEDICINE | Age: 67
DRG: 291 | End: 2022-03-09
Payer: MEDICARE

## 2022-03-09 LAB
ANION GAP SERPL CALCULATED.3IONS-SCNC: 12 MMOL/L (ref 3–16)
BUN BLDV-MCNC: 64 MG/DL (ref 7–20)
CALCIUM SERPL-MCNC: 9.5 MG/DL (ref 8.3–10.6)
CHLORIDE BLD-SCNC: 97 MMOL/L (ref 99–110)
CO2: 33 MMOL/L (ref 21–32)
CREAT SERPL-MCNC: 1.5 MG/DL (ref 0.8–1.3)
GFR AFRICAN AMERICAN: 56
GFR NON-AFRICAN AMERICAN: 47
GLUCOSE BLD-MCNC: 163 MG/DL (ref 70–99)
GLUCOSE BLD-MCNC: 170 MG/DL (ref 70–99)
GLUCOSE BLD-MCNC: 245 MG/DL (ref 70–99)
GLUCOSE BLD-MCNC: 326 MG/DL (ref 70–99)
LV EF: 67 %
LVEF MODALITY: NORMAL
MAGNESIUM: 2.3 MG/DL (ref 1.8–2.4)
PERFORMED ON: ABNORMAL
POTASSIUM SERPL-SCNC: 3.7 MMOL/L (ref 3.5–5.1)
SODIUM BLD-SCNC: 142 MMOL/L (ref 136–145)

## 2022-03-09 PROCEDURE — 83735 ASSAY OF MAGNESIUM: CPT

## 2022-03-09 PROCEDURE — 6370000000 HC RX 637 (ALT 250 FOR IP): Performed by: INTERNAL MEDICINE

## 2022-03-09 PROCEDURE — 6360000002 HC RX W HCPCS: Performed by: INTERNAL MEDICINE

## 2022-03-09 PROCEDURE — 78452 HT MUSCLE IMAGE SPECT MULT: CPT

## 2022-03-09 PROCEDURE — 80048 BASIC METABOLIC PNL TOTAL CA: CPT

## 2022-03-09 PROCEDURE — 93017 CV STRESS TEST TRACING ONLY: CPT

## 2022-03-09 PROCEDURE — 99232 SBSQ HOSP IP/OBS MODERATE 35: CPT | Performed by: INTERNAL MEDICINE

## 2022-03-09 PROCEDURE — 36415 COLL VENOUS BLD VENIPUNCTURE: CPT

## 2022-03-09 PROCEDURE — A9502 TC99M TETROFOSMIN: HCPCS | Performed by: INTERNAL MEDICINE

## 2022-03-09 PROCEDURE — 3430000000 HC RX DIAGNOSTIC RADIOPHARMACEUTICAL: Performed by: INTERNAL MEDICINE

## 2022-03-09 PROCEDURE — 94761 N-INVAS EAR/PLS OXIMETRY MLT: CPT

## 2022-03-09 PROCEDURE — 99232 SBSQ HOSP IP/OBS MODERATE 35: CPT | Performed by: OTOLARYNGOLOGY

## 2022-03-09 PROCEDURE — 1200000000 HC SEMI PRIVATE

## 2022-03-09 PROCEDURE — 2700000000 HC OXYGEN THERAPY PER DAY

## 2022-03-09 RX ADMIN — ENOXAPARIN SODIUM 30 MG: 30 INJECTION SUBCUTANEOUS at 20:41

## 2022-03-09 RX ADMIN — OXYCODONE AND ACETAMINOPHEN 1 TABLET: 5; 325 TABLET ORAL at 23:21

## 2022-03-09 RX ADMIN — OXYCODONE AND ACETAMINOPHEN 1 TABLET: 5; 325 TABLET ORAL at 12:51

## 2022-03-09 RX ADMIN — ATORVASTATIN CALCIUM 40 MG: 40 TABLET, FILM COATED ORAL at 20:41

## 2022-03-09 RX ADMIN — REGADENOSON 0.4 MG: 0.08 INJECTION, SOLUTION INTRAVENOUS at 11:55

## 2022-03-09 RX ADMIN — INSULIN GLARGINE 60 UNITS: 100 INJECTION, SOLUTION SUBCUTANEOUS at 20:46

## 2022-03-09 RX ADMIN — INSULIN LISPRO 6 UNITS: 100 INJECTION, SOLUTION INTRAVENOUS; SUBCUTANEOUS at 20:45

## 2022-03-09 RX ADMIN — HYDRALAZINE HYDROCHLORIDE 10 MG: 10 TABLET, FILM COATED ORAL at 20:41

## 2022-03-09 RX ADMIN — CARVEDILOL 6.25 MG: 6.25 TABLET, FILM COATED ORAL at 18:49

## 2022-03-09 RX ADMIN — OXYCODONE AND ACETAMINOPHEN 1 TABLET: 5; 325 TABLET ORAL at 02:50

## 2022-03-09 RX ADMIN — HYDRALAZINE HYDROCHLORIDE 10 MG: 10 TABLET, FILM COATED ORAL at 05:36

## 2022-03-09 RX ADMIN — SENNOSIDES 8.6 MG: 8.6 TABLET, COATED ORAL at 20:41

## 2022-03-09 RX ADMIN — ACETAMINOPHEN 650 MG: 325 TABLET ORAL at 05:36

## 2022-03-09 RX ADMIN — TETROFOSMIN 33 MILLICURIE: 1.38 INJECTION, POWDER, LYOPHILIZED, FOR SOLUTION INTRAVENOUS at 11:55

## 2022-03-09 RX ADMIN — INSULIN LISPRO 6 UNITS: 100 INJECTION, SOLUTION INTRAVENOUS; SUBCUTANEOUS at 17:37

## 2022-03-09 ASSESSMENT — PAIN SCALES - GENERAL
PAINLEVEL_OUTOF10: 9
PAINLEVEL_OUTOF10: 4
PAINLEVEL_OUTOF10: 8
PAINLEVEL_OUTOF10: 9
PAINLEVEL_OUTOF10: 9

## 2022-03-09 NOTE — PLAN OF CARE
Problem: OXYGENATION/RESPIRATORY FUNCTION  Goal: Patient will maintain patent airway  3/8/2022 2211 by Mauricio Campbell RN  Outcome: Ongoing  Note:   Patient's EF (Ejection Fraction) is greater than 40%    Heart Failure Medications:  Diuretics[de-identified] Furosemide    (One of the following REQUIRED for EF </= 40%/SYSTOLIC FAILURE but MAY be used in EF% >40%/DIASTOLIC FAILURE)        ACE[de-identified] None        ARB[de-identified] None         ARNI[de-identified] None    (Beta Blockers)  NON- Evidenced Based Beta Blocker (for EF% >40%/DIASTOLIC FAILURE): None    Evidenced Based Beta Blocker::(REQUIRED for EF% <40%/SYSTOLIC FAILURE) Carvedilol- Coreg  . .................................................................................................................................................. Patient's weights and intake/output reviewed: Yes    Patient's Last Weight: 291 lbs obtained by standing scale. Difference of 2 lbs less than last documented weight. Intake/Output Summary (Last 24 hours) at 3/8/2022 2211  Last data filed at 3/8/2022 2048  Gross per 24 hour   Intake 477 ml   Output 1225 ml   Net -748 ml       Comorbidities Reviewed Yes    Patient has a past medical history of Anemia, Angina, Arthritis, CAD (coronary artery disease), CHF (congestive heart failure) ([facility] CKD (chronic kidney disease) stage 3, GFR 30-59 ml/min ([facility]), Clostridium difficile diarrhea, Diabetes mellitus ([facility] Diabetic neuropathy ([facility] Disease of blood and blood forming organ, Dizziness, GERD (gastroesophageal reflux disease), Headache, Hearing loss, Hyperlipidemia, Hypertension, Kidney stone, Refusal of blood transfusions as patient is Voodoo, Sleep apnea, Tinnitus, Venous ulcer ([facility] and Wound, open.      >>For CHF and Comorbidity documentation on Education Time and Topics, please see Education Tab    Progressive Mobility Assessment:  What is this patient's Current Level of Mobility?: Ambulatory- with Assistance  How was this patient Mobilized today?: Edge of Bed, Up to Chair, Bedside Commode, and  Up to Toilet/Shower, ambulated 10 ft                 With Whom? Nurse, PCA, PT, and OT                 Level of Difficulty/Assistance: 1x Assist     Pt resting in bed at this time on  2.5 L O2. Pt denies shortness of breath. Pt with pitting lower extremity edema. Patient and/or Family's stated Goal of Care this Admission: reduce shortness of breath, increase activity tolerance, better understand heart failure and disease management, be more comfortable, and reduce lower extremity edema prior to discharge        :     Problem: Serum Glucose Level - Abnormal:  Goal: Ability to maintain appropriate glucose levels will improve  Description: Ability to maintain appropriate glucose levels will improve  Outcome: Ongoing  Note: Pt will have accuchecks before meals and at bedtime with sliding scale insulin in place for coverage. Will continue to monitor for signs and symptoms of hypoglycemia and hyperglycemia throughout shift.

## 2022-03-09 NOTE — CARE COORDINATION
Pt active w/[employer] for home O2, on 3 L at baseline, currently on 2.5 L. [facility]. Pt is currently working w/his [employer]  to find an [address] or [address] apartment. CM attempted to call [employer] for referral, but was unable to reach someone. CM will continue to assist as needed and will continue to try to find additional assistance if possible for patient.   Deny Champion, RN

## 2022-03-09 NOTE — PROGRESS NOTES
[address]   Progress Note  Cardiology    CC: SOB    HPI: breathing about the same. No chest pain     Past Medical History   has a past medical history of Anemia, Angina, Arthritis, CAD (coronary artery disease), CHF (congestive heart failure) ([address] CKD (chronic kidney disease) stage 3, GFR 30-59 ml/min ([facility]), Clostridium difficile diarrhea, Diabetes mellitus ([address] Diabetic neuropathy ([facility] [facility] Disease of blood and blood forming organ, Dizziness, GERD (gastroesophageal reflux disease), Headache, Hearing loss, Hyperlipidemia, Hypertension, Kidney stone, Refusal of blood transfusions as patient is Restorationism, Sleep apnea, Tinnitus, Venous ulcer ([address] and Wound, open. Past Surgical History   has a past surgical history that includes hernia repair (age2); Cholecystectomy (9/2011); other surgical history (11-16-11 REPAIR LOWER STERNAL INCISION, REMOVAL OF ONE STERNAL WIRE,); Upper gastrointestinal endoscopy; Cardiac surgery; other surgical history (10/10/2014); and Pain management procedure (N/A, 2/10/2022). Social History   reports that he quit smoking about 34 years ago. He has a 16.00 pack-year smoking history. He has never used smokeless tobacco. He reports that he does not drink alcohol and does not use drugs. Family History  family history includes Cancer in his father; Diabetes in his brother and brother; Heart Disease in his father and mother. Medications  Prior to Admission medications    Medication Sig Start Date End Date Taking?  Authorizing Provider   amoxicillin-clavulanate (AUGMENTIN) 875-125 MG per tablet Take 1 tablet by mouth 2 times daily for 7 days 3/2/22 3/9/22  Raad Zhang MD   ciprofloxacin (CILOXAN) 0.3 % ophthalmic solution Apply 4 drops twice daily to the left ear 3/1/22   Raad Zhang MD   aspirin-acetaminophen-caffeine (EXCEDRIN MIGRAINE) 079-024-48 MG per tablet Take 1 tablet by mouth every 6 hours as needed for Headaches    Historical Provider, MD oxyCODONE-acetaminophen (PERCOCET) 5-325 MG per tablet Take 1 tablet by mouth 4 times daily for 30 days. 2/28/22 3/30/22  PATRICK Rob CNP   hydrALAZINE (APRESOLINE) 10 MG tablet Take 1 tablet by mouth every 8 hours 1/17/22   Franchesca Thompson MD   torsemide (DEMADEX) 100 MG tablet Take half tablet twice per day, but on days when your weight is above 282 lbs, take a full tablet twice per day. 1/17/22   Franchesca Thompson MD   naloxone 4 MG/0.1ML LIQD nasal spray 1 spray by Nasal route as needed for Opioid Reversal 11/23/21   Erick Davis MD   carvedilol (COREG) 12.5 MG tablet Take 1 tablet by mouth 2 times daily (with meals) 8/31/21   PATRICK Cooper CNP   spironolactone (ALDACTONE) 25 MG tablet Take 1 tablet by mouth daily 9/1/21   PATRICK Cooper CNP   metOLazone (ZAROXOLYN) 2.5 MG tablet Take 1 tablet by mouth every other day 8/31/21   PATRICK Cooper CNP   atorvastatin (LIPITOR) 40 MG tablet Take 1 tablet by mouth nightly 8/31/21   PATRICK Cooper CNP   linaCLOtide (LINZESS PO) Take by mouth    Historical Provider, MD   clotrimazole (LOTRIMIN) 1 % external solution Apply 4 drops to the left ear twice daily (morning and evening) for 10 days 10/5/20   Jeannette Mauro MD   acetaminophen (TYLENOL) 500 MG tablet Take 2 tablets by mouth 4 times daily as needed for Pain 9/17/20   PATRICK Sierra CNP   hydrocortisone (ANUSOL-HC) 2.5 % rectal cream Place rectally 2 times daily.  8/7/19   PATRICK Evangelista CNP   senna (SENOKOT) 8.6 MG TABS tablet Take 1 tablet by mouth 2 times daily 7/8/19   PATRICK Medeiros CNP   colchicine (COLCRYS) 0.6 MG tablet Take 1 tablet by mouth daily 5/2/18   PATRICK David CNP   docusate sodium (COLACE, DULCOLAX) 100 MG CAPS Take 100 mg by mouth 2 times daily 5/1/18   PATRICK David CNP   insulin aspart (NOVOLOG) 100 UNIT/ML injection vial Inject into the skin 3 times daily (before meals) 50 units with breakfast, 20 units with lunch and 60 units with dinner    Historical Provider, MD   allopurinol (ZYLOPRIM) 100 MG tablet Take 300 mg by mouth     Historical Provider, MD   insulin glargine (LANTUS) 100 UNIT/ML injection vial Inject 55 Units into the skin 2 times daily  Patient taking differently: Inject 80 Units into the skin 2 times daily  1/15/17   Ximena Brandon MD   Compression Stockings MISC by Does not apply route 2 pair, knee high compression stockings, fitted/ zippered 6/15/16   PATRICK Villeda CNP   silver sulfADIAZINE (SILVADENE) 1 % cream Apply to BL lower leg ulcers daily 2/9/16   Chasidy Baeza MD   nitroGLYCERIN (NITROSTAT) 0.4 MG SL tablet Place 1 tablet under the tongue every 5 minutes as needed 8/17/15   PATRICK Villeda CNP   fluticasone (FLONASE) 50 MCG/ACT nasal spray 1 spray by Nasal route nightly as needed. Historical Provider, MD   aspirin 81 MG chewable tablet Take 1 tablet by mouth daily. 1/11/13   Tonio Woo MD   isosorbide mononitrate (IMDUR) 30 MG CR tablet Take 1 tablet by mouth daily. 1/11/13   Tonio Woo MD   ferrous sulfate 325 (65 FE) MG tablet Take 325 mg by mouth 3 times daily (with meals). 12/22/10   Historical Provider, MD   omeprazole (PRILOSEC) 20 MG capsule Take 20 mg by mouth 2 times daily. 12/22/10   Historical Provider, MD       Allergies  Amitriptyline, Celecoxib, Cymbalta [duloxetine hcl], Elavil [amitriptyline hcl], Gabapentin, and Nsaids    Review of Systems:   Reviewed.  No changes except as noted in HPI and A/P      Lab Results   Component Value Date    WBC 7.8 03/07/2022    HGB 10.6 (L) 03/07/2022    HCT 32.1 (L) 03/07/2022    MCV 86.3 03/07/2022     03/07/2022     Lab Results   Component Value Date    CREATININE 1.5 (H) 03/09/2022    BUN 64 (H) 03/09/2022     03/09/2022    K 3.7 03/09/2022    CL 97 (L) 03/09/2022    CO2 33 (H) 03/09/2022    .0 (H) 11/23/2016     Lab Results   Component Value Date    INR 1.22 (H) 09/17/2015    PROTIME 13.3 (H) 09/17/2015       I reviewed EKGs and radiology imaging. Pertinent findings and changes as described in assessment below. Physical Examination:    BP (!) 147/68   Pulse 68   Temp 98.3 °F (36.8 °C) (Oral)   Resp 18   Ht 5' 9\" (1.753 m)   Wt 286 lb 9.6 oz (130 kg)   SpO2 90%   BMI 42.32 kg/m²      General Appearance:  Alert, no distress, appears stated age   Head:  Normocephalic, without obvious abnormality, atraumatic   Eyes:  PERRL, conjunctiva/corneas clear         Nose: Nares normal, no drainage or sinus tenderness   Throat: Lips, mucosa, and tongue normal   Neck: Supple, symmetrical, trachea midline, no adenopathy      elevated JVP   Lungs:   rales to auscultation bilaterally    Chest Wall:  No tenderness or deformity   Heart:  Regular rate and rhythm, S1, S2 normal, no murmur, rub or gallop   Abdomen:   Soft, non tender, normal bowel sounds                Extremities: No cyanosis, +edema   Pulses: 2+ and symmetric   Skin: Skin color, texture, turgor normal, no rashes    Pysch: Normal mood and affect   Neurologic: Normal gross motor and sensory exam.      Tele: NSR    Assessment:    SOB - multifactorial including pulmonary edema. About the same   Elev trop - due to supply-demand imbalance exacerbated by acute on chronic kidney disease. stable  CAD - possible progression.  Await stress test results   CABG 2010  Cath 2012 patent grafts   DM  HTN - labile, will monitor   HLD  C dCHF, EF 50% 1/2022  A/CKD - stable, Cr slight decreased   Obesity  Otitis  AS - mod  Atrial arrhythmia  HENNA  Anemia     Plan  Diuresis, lasix drip - continue  Stress test pending today  Monitor labs radha Alexis MD, 3/9/2022 8:28 AM

## 2022-03-09 NOTE — PROGRESS NOTES
[address] [facility] SURGERY  PROGRESS NOTE      Patient Name: Chris Yeung  Medical Record Number:  [id number]  Primary Care Physician:  PATRICK Saenz CNP  Date of Consultation: 3/9/2022    Chief Complaint: Left ear otalgia    INTERVAL HISTORY  Erby Homans is a(n) 78 y.o. male who presents to the hospital with CHF exacerbation, with left ear otalgia - no improvement with antibiotic eardrops, Augmentin. Left ear suctioned/debrided today - tympanic membrane perforation noted. Will start antifungal drops as outpatient as inpatient pharmacy does not carry them.      Patient Active Problem List   Diagnosis    DM (diabetes mellitus) ([address]    Coronary artery disease involving native coronary artery of native heart without angina pectoris    Anemia of chronic disease    Essential hypertension    Hyperkalemia    Chronic diastolic heart failure ([address]    Pulmonary hypertension due to left ventricular diastolic dysfunction ([address]    Morbid obesity ([address]    S/P CABG x 3    DM2 (diabetes mellitus, type 2) ([address]    Morbid obesity with BMI of 50.0-59.9, adult ([address]    HLD (hyperlipidemia)    Cardiomyopathy ([address]    Productive cough    Chronic pain    Severe obstructive sleep apnea    Obesity, Class III, BMI 40-49.9 (morbid obesity) (HCC)    Acute diastolic congestive heart failure (HCC)    Degeneration of lumbar or lumbosacral intervertebral disc    Displacement of lumbar intervertebral disc without myelopathy    Lumbosacral spondylosis without myelopathy    Lumbar facet arthropathy    Disc displacement, lumbar    Spinal stenosis of lumbar region    Mixed conductive and sensorineural hearing loss of left ear with restricted hearing of right ear    Tympanic membrane perforation, left    Chest pain    Cor pulmonale, chronic (HCC)    Pulmonary hypertension due to alveolar hypoventilation disorder (HCC)    Nonrheumatic aortic valve stenosis    Chronic neck pain  Dyspnea    Acute diastolic CHF (congestive heart failure) (HCC)    Cervical stenosis of spinal canal    Degeneration of cervical intervertebral disc    Cervical radiculitis    Acute on chronic diastolic CHF (congestive heart failure) [facility])     Past Surgical History:   Procedure Laterality Date   Jess High CARDIAC SURGERY      Dec 27 2010, triple bypass    CHOLECYSTECTOMY  2011    HERNIA REPAIR  age2    OTHER SURGICAL HISTORY  11 REPAIR LOWER STERNAL INCISION, REMOVAL OF ONE STERNAL WIRE,    OTHER SURGICAL HISTORY  10/10/2014    phacoemulsification of cataract with intraocular lens implant left eye    PAIN MANAGEMENT PROCEDURE N/A 2/10/2022    MIDLINE CERVICAL SIX SEVEN EPIDURAL STEROID INJECTION SITE CONFIRMED BY FLUOROSCOPY performed by Tata Smith MD at [address] [facility] OR    UPPER GASTROINTESTINAL ENDOSCOPY       Family History   Problem Relation Age of Onset    Heart Disease Mother     Heart Disease Father     Cancer Father     Diabetes Brother     Diabetes Brother      Social History     Socioeconomic History    Marital status: Single     Spouse name: Not on file    Number of children: Not on file    Years of education: Not on file    Highest education level: Not on file   Occupational History    Not on file   Tobacco Use    Smoking status: Former Smoker     Packs/day: 1.00     Years: 16.00     Pack years: 16.00     Quit date: 1/10/1988     Years since quittin.1    Smokeless tobacco: Never Used    Tobacco comment: 22 yrs ago   Vaping Use    Vaping Use: Never used   Substance and Sexual Activity    Alcohol use: No     Alcohol/week: 0.0 standard drinks    Drug use: No    Sexual activity: Not on file   Other Topics Concern    Not on file   Social History Narrative    Not on file     Social Determinants of Health     Financial Resource Strain:     Difficulty of Paying Living Expenses: Not on file   Food Insecurity:     Worried About Running Out of Food in the Last Year: Not on tablet by mouth every 8 hours 1/17/22   Nick Trujillo MD   torsemide (DEMADEX) 100 MG tablet Take half tablet twice per day, but on days when your weight is above 282 lbs, take a full tablet twice per day. 1/17/22   Nick Trujillo MD   naloxone 4 MG/0.1ML LIQD nasal spray 1 spray by Nasal route as needed for Opioid Reversal 11/23/21   Arabella Alfaro MD   carvedilol (COREG) 12.5 MG tablet Take 1 tablet by mouth 2 times daily (with meals) 8/31/21   PATRICK Lazo CNP   spironolactone (ALDACTONE) 25 MG tablet Take 1 tablet by mouth daily 9/1/21   PATRICK Lazo CNP   metOLazone (ZAROXOLYN) 2.5 MG tablet Take 1 tablet by mouth every other day 8/31/21   PATRICK Lazo CNP   atorvastatin (LIPITOR) 40 MG tablet Take 1 tablet by mouth nightly 8/31/21   PATRICK Lazo CNP   linaCLOtide (LINZESS PO) Take by mouth    Historical Provider, MD   clotrimazole (LOTRIMIN) 1 % external solution Apply 4 drops to the left ear twice daily (morning and evening) for 10 days 10/5/20   Son Araujo MD   acetaminophen (TYLENOL) 500 MG tablet Take 2 tablets by mouth 4 times daily as needed for Pain 9/17/20   PATRICK Hollingsworth CNP   hydrocortisone (ANUSOL-HC) 2.5 % rectal cream Place rectally 2 times daily.  8/7/19   PATRICK Manning CNP   senna (SENOKOT) 8.6 MG TABS tablet Take 1 tablet by mouth 2 times daily 7/8/19   PATRICK Medeiros CNP   docusate sodium (COLACE, DULCOLAX) 100 MG CAPS Take 100 mg by mouth 2 times daily 5/1/18   PATRICK Alvarado CNP   insulin aspart (NOVOLOG) 100 UNIT/ML injection vial Inject into the skin 3 times daily (before meals) 50 units with breakfast, 20 units with lunch and 60 units with dinner    Historical Provider, MD   allopurinol (ZYLOPRIM) 100 MG tablet Take 300 mg by mouth     Historical Provider, MD   insulin glargine (LANTUS) 100 UNIT/ML injection vial Inject 55 Units into the skin 2 times daily  Patient taking differently: Inject 80 Units into the skin 2 times daily  1/15/17   Mckenna Jain MD   Compression Stockings MISC by Does not apply route 2 pair, knee high compression stockings, fitted/ zippered 6/15/16   PATRICK Tirado CNP   silver sulfADIAZINE (SILVADENE) 1 % cream Apply to BL lower leg ulcers daily 2/9/16   Joseph Sanchez MD   nitroGLYCERIN (NITROSTAT) 0.4 MG SL tablet Place 1 tablet under the tongue every 5 minutes as needed 8/17/15   PATRICK Tirado CNP   fluticasone (FLONASE) 50 MCG/ACT nasal spray 1 spray by Nasal route nightly as needed. Historical Provider, MD   aspirin 81 MG chewable tablet Take 1 tablet by mouth daily. 1/11/13   Alexi Wilkerson MD   isosorbide mononitrate (IMDUR) 30 MG CR tablet Take 1 tablet by mouth daily. 1/11/13   Alexi Wilkerson MD   ferrous sulfate 325 (65 FE) MG tablet Take 325 mg by mouth 3 times daily (with meals). 12/22/10   Historical Provider, MD   omeprazole (PRILOSEC) 20 MG capsule Take 20 mg by mouth 2 times daily. 12/22/10   Historical Provider, MD       PHYSICAL EXAM  BP (!) 147/68   Pulse 68   Temp 98.3 °F (36.8 °C) (Oral)   Resp 18   Ht 5' 9\" (1.753 m)   Wt 286 lb 9.6 oz (130 kg)   SpO2 90%   BMI 42.32 kg/m²     GENERAL: No Acute Distress, Alert and Oriented, no hoarseness  EYES: EOMI, Anti-icteric  NOSE: No epistaxis, nasal mucosa within normal limits, no purulent drainage  EARS: Left preauricular pain - fungal hyphae/debris noted in the left EAC - suctioned.  Right EAC clear, TM intact, no middle ear effusions  FACE: 1/6 House-Brackmann Scale, symmetric, sensation equal bilaterally    LABS  Lab Results   Component Value Date    WBC 7.8 03/07/2022    HGB 10.6 (L) 03/07/2022    HCT 32.1 (L) 03/07/2022    MCV 86.3 03/07/2022     03/07/2022     Lab Results   Component Value Date     03/09/2022    K 3.7 03/09/2022    K 3.7 03/07/2022    CL 97 03/09/2022    CO2 33 03/09/2022    BUN 64 03/09/2022    CREATININE 1.5 03/09/2022    GLUCOSE 163 03/09/2022    CALCIUM 9.5 03/09/2022        ASSESSMENT/PLAN  Romeo Monaco is a very pleasant 78 y.o. male with left acute fungal otitis externa  -Ears debrided  -Start antifungal drops as outpatient  -Keep ears dry - no q-tips, water exposure

## 2022-03-09 NOTE — PLAN OF CARE
Problem: Falls - Risk of:  Goal: Will remain free from falls  Description: Will remain free from falls  3/8/2022 2211 by Shanti Chacon RN  Outcome: Ongoing  Note: Pt will remain free from falls throughout hospital stay. Fall precautions in place, bed alarm on, bed in lowest position with wheels locked and side rails 2/4 up. Room door open and hourly rounding completed. Will continue to monitor throughout shift. Problem: Pain:  Goal: Pain level will decrease  Description: Pain level will decrease  3/8/2022 2211 by Shanti Chacon RN  Outcome: Ongoing  Note: Pt will be satisfied with pain control. Pt uses numeric pain rating scale with reassessments after pain med administration. Will continue to monitor progression throughout shift.

## 2022-03-09 NOTE — PROGRESS NOTES
Hospitalist Progress Note    Date of Admission: 3/7/2022    Chief Complaint: SOB    Hospital Course:   78 y.o. male who presented to [facility] with SOB. PMHx significant for former smoking, HTN, HLD, DM2, CAD s/p CABG in 2010, and dCHF. He has had recurrent hospitalizations for CHF, most recently 1/2022. He reports compliance with his diuretic regimen of Torsemide, Aldactone and Metolazone. He reports progressive increase in SOB/TAYLOR as well as worsening LE edema. He also notes recent issues with left ear infection. He was initially started on Cipro drops and later Augmentin (3/2/22) by ENT Dr. Carlotta Ramirez. He is concerned that the ear infection has not resolved as he has continued pain     Subjective: Improving and only mild SOB. Improving LE edema. No chest pain. Stress test planned. Still has severe left ear pain. Labs:   Recent Labs     03/07/22  1457   WBC 7.8   HGB 10.6*   HCT 32.1*        Recent Labs     03/07/22  1457 03/08/22  0710 03/09/22  0720   * 138 142   K 3.7 3.8 3.7   CL 88* 93* 97*   CO2 31 34* 33*   BUN 76* 75* 64*   CREATININE 1.7* 1.7* 1.5*   CALCIUM 9.2 9.2 9.5     Recent Labs     03/07/22  1457   AST 14*   ALT 12   BILITOT 0.4   ALKPHOS 109     No results for input(s): INR in the last 72 hours. Physical Exam Performed:    BP (!) 147/68   Pulse 68   Temp 98.3 °F (36.8 °C) (Oral)   Resp 18   Ht 5' 9\" (1.753 m)   Wt 286 lb 9.6 oz (130 kg)   SpO2 90%   BMI 42.32 kg/m²   General appearance:  No apparent distress, appears stated age and cooperative. Morbidly obese. HEENT:  Pupils equal, round, and reactive to light. Conjunctivae/corneas clear. Neck:  Supple, no jugular venous distention. Trachea midline with full range of motion. Respiratory:  Normal respiratory effort. Diminished but clear to auscultation, bilaterally without Rales/Wheezes/Rhonchi. Cardiovascular:  Regular rate and rhythm with normal S1/S2 without murmurs, rubs or gallops.   Abdomen:  Soft, non-tender, non-distended with normal bowel sounds. Musculoskelatal:  No clubbing, cyanosis. Improving LE edema bilaterally. Full range of motion without deformity. Neurologic:  Neurovascularly intact without any focal sensory/motor deficits. Cranial nerves: II-XII intact, grossly non-focal.  Psychiatric:  Alert and oriented, thought content appropriate, normal insight  Skin:  Skin color, texture, turgor normal.  No rashes or lesions. Capillary Refill:  Brisk,< 3 seconds   Peripheral Pulses:  +2 palpable, equal bilaterally     Assessment/Plan:    Active Hospital Problems    Diagnosis     Acute on chronic diastolic CHF (congestive heart failure) ([facility]) [I50.33]     Morbid obesity ([facility] [E66.01]     Essential hypertension [I10]     DM (diabetes mellitus) ([facility] [E11.9]      Acute on chronic diastolic CHF (congestive heart failure): Recurrent admissions for same, most recently 1/2022. Worsening dyspnea, LE edema, elevated BNP. CXR findings most consistent with CHF as appearance is similar to some of his recent admissions for CHF. Procal is borderline elevated but in setting of CKD. Do not feel he needs further Abx for Pneumonia, which has been ruled out. Cardiology following. Continue IV Diuresis with Lasix drip. Monitor I/O, weights, BMP. Plan for NM Stress test.     L Ear Otitis: He reports persistent left ear pain despite recent course of topical Cipro and PO Augmentin (started 3/2/22 by Dr. Carlotta Ramirez). Consulted ENT here and diagnosed with fungal otitis. S/p debridement on 3/9 and fungal ear drops recommended after discharge (?not available inpatient). Chronic hypoxic respiratory failure: Monitor. Baseline 3.5 LNC.     CKD3. Near baseline. Monitor with diuresis.  Monitor.     Diabetes Mellitus, Type 2: Controlled. Continue basal-bolus Insulin regimen. Monitor blood sugar and adjust regimen as needed. Diabetic (Carb-controlled) diet when able. CAD.  Aspirin, statin, carvedilol, ISMN.     Morbid Obesity - With Body mass index is 42.97 kg/m². Complicating assessment and treatment.  Placing patient at risk for multiple co-morbidities as well as early death and contributing to the patient's presentation.     Incidental finding of small lung nodules.  Due to his smoking history, radiology recommended that he have another CT in 3-6 months.  Defer to PCP.     Of note, he is Jailyn Poot witness and does not want transfusions.      DVT Prophylaxis: Lovenox  Diet: Diet NPO  Code Status: Full Code  PT/OT Eval Status: Pending course    Dispo - 2-3 days at least    Peyton Beckham MD

## 2022-03-09 NOTE — PLAN OF CARE
Problem: Falls - Risk of:  Goal: Will remain free from falls  Description: Will remain free from falls  3/9/2022 0734 by Richelle Barrientos RN  Outcome: Ongoing     Problem: Pain:  Goal: Pain level will decrease  Description: Pain level will decrease  3/9/2022 0734 by Richelle Barrientos RN  Outcome: Ongoing     Problem: OXYGENATION/RESPIRATORY FUNCTION  Goal: Patient will maintain patent airway  3/9/2022 0734 by Richelle Barrientos RN  Outcome: Ongoing     Problem: HEMODYNAMIC STATUS  Goal: Patient has stable vital signs and fluid balance  Outcome: Ongoing     Problem: FLUID AND ELECTROLYTE IMBALANCE  Goal: Fluid and electrolyte balance are achieved/maintained  Outcome: Ongoing

## 2022-03-09 NOTE — PLAN OF CARE
Patient's EF (Ejection Fraction) is greater than 40%    Heart Failure Medications:   Diuretics[de-identified] Furosemide     (One of the following REQUIRED for EF </= 40%/SYSTOLIC FAILURE but MAY be used in EF% >40%/DIASTOLIC FAILURE)        ACE[de-identified] None        ARB[de-identified] None         ARNI[de-identified] None    (Beta Blockers)   NON- Evidenced Based Beta Blocker (for EF% >40%/DIASTOLIC FAILURE): None     Evidenced Based Beta Blocker::(REQUIRED for EF% <40%/SYSTOLIC FAILURE) Carvedilol- Coreg  . .................................................................................................................................................. Patient's weights and intake/output reviewed: Yes    Patient's Last Weight: 286 lbs obtained by standing scale. Difference of 5 lbs less than last documented weight. Intake/Output Summary (Last 24 hours) at 3/9/2022 0732  Last data filed at 3/8/2022 2340  Gross per 24 hour   Intake 477 ml   Output 1175 ml   Net -698 ml       Comorbidities Reviewed Yes    Patient has a past medical history of Anemia, Angina, Arthritis, CAD (coronary artery disease), CHF (congestive heart failure) ([address] CKD (chronic kidney disease) stage 3, GFR 30-59 ml/min ([facility]), Clostridium difficile diarrhea, Diabetes mellitus ([address] Diabetic neuropathy ([address] Disease of blood and blood forming organ, Dizziness, GERD (gastroesophageal reflux disease), Headache, Hearing loss, Hyperlipidemia, Hypertension, Kidney stone, Refusal of blood transfusions as patient is Bahai, Sleep apnea, Tinnitus, Venous ulcer ([address] and Wound, open. >>For CHF and Comorbidity documentation on Education Time and Topics, please see Education Tab    Progressive Mobility Assessment:  What is this patient's Current Level of Mobility?: Ambulatory- with Assistance  How was this patient Mobilized today?: Edge of Bed, Up to Chair,  Up to Toilet/Shower and Up in Room, ambulated 30 ft                 With Whom?  Nurse, PCA and Self                 Level of Difficulty/Assistance: standby     Pt resting in bed at this time on 4 L O2. Pt denies shortness of breath. Pt with pitting lower extremity edema. Patient and/or Family's stated Goal of Care this Admission: reduce shortness of breath, increase activity tolerance, better understand heart failure and disease management and reduce lower extremity edema prior to discharge.

## 2022-03-10 ENCOUNTER — APPOINTMENT (OUTPATIENT)
Dept: NUCLEAR MEDICINE | Age: 67
DRG: 291 | End: 2022-03-10
Payer: MEDICARE

## 2022-03-10 LAB
ANION GAP SERPL CALCULATED.3IONS-SCNC: 10 MMOL/L (ref 3–16)
BUN BLDV-MCNC: 52 MG/DL (ref 7–20)
CALCIUM SERPL-MCNC: 9.5 MG/DL (ref 8.3–10.6)
CHLORIDE BLD-SCNC: 97 MMOL/L (ref 99–110)
CO2: 34 MMOL/L (ref 21–32)
CREAT SERPL-MCNC: 1.2 MG/DL (ref 0.8–1.3)
GFR AFRICAN AMERICAN: >60
GFR NON-AFRICAN AMERICAN: >60
GLUCOSE BLD-MCNC: 199 MG/DL (ref 70–99)
GLUCOSE BLD-MCNC: 200 MG/DL (ref 70–99)
GLUCOSE BLD-MCNC: 243 MG/DL (ref 70–99)
GLUCOSE BLD-MCNC: 249 MG/DL (ref 70–99)
GLUCOSE BLD-MCNC: 252 MG/DL (ref 70–99)
GLUCOSE BLD-MCNC: 328 MG/DL (ref 70–99)
MAGNESIUM: 2.1 MG/DL (ref 1.8–2.4)
PERFORMED ON: ABNORMAL
POTASSIUM SERPL-SCNC: 3.6 MMOL/L (ref 3.5–5.1)
PRO-BNP: 1280 PG/ML (ref 0–124)
SODIUM BLD-SCNC: 141 MMOL/L (ref 136–145)

## 2022-03-10 PROCEDURE — 6360000002 HC RX W HCPCS: Performed by: INTERNAL MEDICINE

## 2022-03-10 PROCEDURE — 36415 COLL VENOUS BLD VENIPUNCTURE: CPT

## 2022-03-10 PROCEDURE — 99233 SBSQ HOSP IP/OBS HIGH 50: CPT | Performed by: NURSE PRACTITIONER

## 2022-03-10 PROCEDURE — 94761 N-INVAS EAR/PLS OXIMETRY MLT: CPT

## 2022-03-10 PROCEDURE — 2580000003 HC RX 258: Performed by: INTERNAL MEDICINE

## 2022-03-10 PROCEDURE — 99231 SBSQ HOSP IP/OBS SF/LOW 25: CPT | Performed by: OTOLARYNGOLOGY

## 2022-03-10 PROCEDURE — 80048 BASIC METABOLIC PNL TOTAL CA: CPT

## 2022-03-10 PROCEDURE — A9502 TC99M TETROFOSMIN: HCPCS | Performed by: INTERNAL MEDICINE

## 2022-03-10 PROCEDURE — 83735 ASSAY OF MAGNESIUM: CPT

## 2022-03-10 PROCEDURE — 83880 ASSAY OF NATRIURETIC PEPTIDE: CPT

## 2022-03-10 PROCEDURE — 6370000000 HC RX 637 (ALT 250 FOR IP): Performed by: INTERNAL MEDICINE

## 2022-03-10 PROCEDURE — 3430000000 HC RX DIAGNOSTIC RADIOPHARMACEUTICAL: Performed by: INTERNAL MEDICINE

## 2022-03-10 PROCEDURE — 1200000000 HC SEMI PRIVATE

## 2022-03-10 PROCEDURE — 2700000000 HC OXYGEN THERAPY PER DAY

## 2022-03-10 PROCEDURE — 6370000000 HC RX 637 (ALT 250 FOR IP): Performed by: NURSE PRACTITIONER

## 2022-03-10 PROCEDURE — 6370000000 HC RX 637 (ALT 250 FOR IP): Performed by: FAMILY MEDICINE

## 2022-03-10 RX ORDER — PREGABALIN 25 MG/1
25 CAPSULE ORAL 2 TIMES DAILY
Status: DISCONTINUED | OUTPATIENT
Start: 2022-03-10 | End: 2022-03-14 | Stop reason: HOSPADM

## 2022-03-10 RX ORDER — ATORVASTATIN CALCIUM 80 MG/1
80 TABLET, FILM COATED ORAL NIGHTLY
Status: DISCONTINUED | OUTPATIENT
Start: 2022-03-10 | End: 2022-03-14 | Stop reason: HOSPADM

## 2022-03-10 RX ORDER — PREGABALIN 75 MG/1
75 CAPSULE ORAL ONCE
Status: DISCONTINUED | OUTPATIENT
Start: 2022-03-10 | End: 2022-03-10

## 2022-03-10 RX ADMIN — INSULIN LISPRO 5 UNITS: 100 INJECTION, SOLUTION INTRAVENOUS; SUBCUTANEOUS at 17:21

## 2022-03-10 RX ADMIN — OXYCODONE AND ACETAMINOPHEN 1 TABLET: 5; 325 TABLET ORAL at 10:46

## 2022-03-10 RX ADMIN — FUROSEMIDE 5 MG/HR: 10 INJECTION, SOLUTION INTRAMUSCULAR; INTRAVENOUS at 17:20

## 2022-03-10 RX ADMIN — ATORVASTATIN CALCIUM 80 MG: 80 TABLET, FILM COATED ORAL at 21:36

## 2022-03-10 RX ADMIN — INSULIN LISPRO 6 UNITS: 100 INJECTION, SOLUTION INTRAVENOUS; SUBCUTANEOUS at 11:46

## 2022-03-10 RX ADMIN — SENNOSIDES 8.6 MG: 8.6 TABLET, COATED ORAL at 21:36

## 2022-03-10 RX ADMIN — ISOSORBIDE MONONITRATE 30 MG: 30 TABLET, EXTENDED RELEASE ORAL at 10:46

## 2022-03-10 RX ADMIN — PREGABALIN 25 MG: 25 CAPSULE ORAL at 10:46

## 2022-03-10 RX ADMIN — ENOXAPARIN SODIUM 30 MG: 30 INJECTION SUBCUTANEOUS at 21:36

## 2022-03-10 RX ADMIN — ALLOPURINOL 300 MG: 300 TABLET ORAL at 10:46

## 2022-03-10 RX ADMIN — CARVEDILOL 6.25 MG: 6.25 TABLET, FILM COATED ORAL at 17:20

## 2022-03-10 RX ADMIN — ASPIRIN 81 MG 81 MG: 81 TABLET ORAL at 10:46

## 2022-03-10 RX ADMIN — TETROFOSMIN 32.5 MILLICURIE: 1.38 INJECTION, POWDER, LYOPHILIZED, FOR SOLUTION INTRAVENOUS at 09:07

## 2022-03-10 RX ADMIN — FUROSEMIDE 5 MG/HR: 10 INJECTION, SOLUTION INTRAMUSCULAR; INTRAVENOUS at 02:56

## 2022-03-10 RX ADMIN — INSULIN LISPRO 6 UNITS: 100 INJECTION, SOLUTION INTRAVENOUS; SUBCUTANEOUS at 21:37

## 2022-03-10 RX ADMIN — SENNOSIDES 8.6 MG: 8.6 TABLET, COATED ORAL at 10:46

## 2022-03-10 RX ADMIN — INSULIN GLARGINE 60 UNITS: 100 INJECTION, SOLUTION SUBCUTANEOUS at 21:38

## 2022-03-10 RX ADMIN — INSULIN LISPRO 9 UNITS: 100 INJECTION, SOLUTION INTRAVENOUS; SUBCUTANEOUS at 17:21

## 2022-03-10 RX ADMIN — PREGABALIN 25 MG: 25 CAPSULE ORAL at 23:30

## 2022-03-10 RX ADMIN — OXYCODONE AND ACETAMINOPHEN 1 TABLET: 5; 325 TABLET ORAL at 17:41

## 2022-03-10 ASSESSMENT — PAIN SCALES - GENERAL
PAINLEVEL_OUTOF10: 8
PAINLEVEL_OUTOF10: 0
PAINLEVEL_OUTOF10: 6
PAINLEVEL_OUTOF10: 2
PAINLEVEL_OUTOF10: 0
PAINLEVEL_OUTOF10: 2

## 2022-03-10 ASSESSMENT — PAIN DESCRIPTION - PAIN TYPE
TYPE: ACUTE PAIN
TYPE: CHRONIC PAIN
TYPE: CHRONIC PAIN

## 2022-03-10 ASSESSMENT — PAIN DESCRIPTION - PROGRESSION
CLINICAL_PROGRESSION: GRADUALLY WORSENING
CLINICAL_PROGRESSION: GRADUALLY WORSENING

## 2022-03-10 ASSESSMENT — PAIN DESCRIPTION - LOCATION
LOCATION: EAR
LOCATION: EAR

## 2022-03-10 ASSESSMENT — PAIN DESCRIPTION - ORIENTATION
ORIENTATION: LEFT
ORIENTATION: LEFT

## 2022-03-10 NOTE — PLAN OF CARE
Problem: Falls - Risk of:  Goal: Will remain free from falls  Description: Will remain free from falls  3/10/2022 1113 by Tia Juárez RN  Outcome: Ongoing    Problem: Pain:  Goal: Pain level will decrease  Description: Pain level will decrease  3/10/2022 1113 by Tia Juárez RN  Outcome: Ongoing    Problem: Serum Glucose Level - Abnormal:  Goal: Ability to maintain appropriate glucose levels will improve  Description: Ability to maintain appropriate glucose levels will improve  3/10/2022 1113 by Tia Juárez RN  Outcome: Ongoing

## 2022-03-10 NOTE — PROGRESS NOTES
Hospitalist Progress Note    Date of Admission: 3/7/2022    Chief Complaint: SOB    Hospital Course:   78 y.o. male who presented to [facility] with SOB. PMHx significant for former smoking, HTN, HLD, DM2, CAD s/p CABG in 2010, and dCHF. He has had recurrent hospitalizations for CHF, most recently 1/2022. He reports compliance with his diuretic regimen of Torsemide, Aldactone and Metolazone. He reports progressive increase in SOB/TAYLOR as well as worsening LE edema. He also notes recent issues with left ear infection. He was initially started on Cipro drops and later Augmentin (3/2/22) by ENT Dr. Annie Mchugh. He is concerned that the ear infection has not resolved as he has continued pain     Subjective: Continues to have edema. Thirsty. Still with ear pain. Labs:   Recent Labs     03/07/22  1457   WBC 7.8   HGB 10.6*   HCT 32.1*        Recent Labs     03/08/22  0710 03/09/22  0720 03/10/22  0704    142 141   K 3.8 3.7 3.6   CL 93* 97* 97*   CO2 34* 33* 34*   BUN 75* 64* 52*   CREATININE 1.7* 1.5* 1.2   CALCIUM 9.2 9.5 9.5     Recent Labs     03/07/22  1457   AST 14*   ALT 12   BILITOT 0.4   ALKPHOS 109     No results for input(s): INR in the last 72 hours. Physical Exam Performed:    BP (!) 145/69   Pulse 65   Temp 98.1 °F (36.7 °C) (Oral)   Resp 18   Ht 5' 9\" (1.753 m)   Wt 281 lb 4.8 oz (127.6 kg)   SpO2 95%   BMI 41.54 kg/m²   General appearance:  No apparent distress, appears stated age and cooperative. Morbidly obese. HEENT:  Pupils equal, round, and reactive to light. Conjunctivae/corneas clear. Neck:  Supple, no jugular venous distention. Trachea midline with full range of motion. Respiratory:  Normal respiratory effort. Diminished but clear to auscultation, bilaterally without Rales/Wheezes/Rhonchi. Cardiovascular:  Regular rate and rhythm with normal S1/S2 without murmurs, rubs or gallops.   Abdomen:  Soft, non-tender, non-distended with normal bowel sounds. Musculoskelatal:  No clubbing, cyanosis. Improving LE edema bilaterally. Full range of motion without deformity. Generalized weakness. Neurologic:  Neurovascularly intact without any focal sensory/motor deficits. Cranial nerves: II-XII intact, grossly non-focal.  Psychiatric:  Alert and oriented, thought content appropriate, normal insight  Skin:  Skin color, texture, turgor normal.  No rashes or lesions. Capillary Refill:  Brisk,< 3 seconds   Peripheral Pulses:  +2 palpable, equal bilaterally     Assessment/Plan:    Active Hospital Problems    Diagnosis     Acute on chronic diastolic CHF (congestive heart failure) ([facility]) [I50.33]     Morbid obesity ([facility] [E66.01]     Essential hypertension [I10]     DM (diabetes mellitus) ([facility] [E11.9]      Acute on chronic diastolic CHF (congestive heart failure): Recurrent admissions for same, most recently 1/2022. Worsening dyspnea, LE edema, elevated BNP. CXR findings most consistent with CHF as appearance is similar to some of his recent admissions for CHF. Procal is borderline elevated but in setting of CKD. Do not feel he needs further Abx for Pneumonia, which has been ruled out. Cardiology following. Continue IV Diuresis with Lasix drip. Monitor I/O, weights, BMP. Plan for NM Stress test 3/10/22 - abnormal/moderate risk. Plan to medically manage due to MARGARITO on CKD and medical non-compliance. L Ear Otitis: He reports persistent left ear pain despite recent course of topical Cipro and PO Augmentin (started 3/2/22 by Dr. Angel Hill). Consulted ENT here and diagnosed with fungal otitis. S/p debridement on 3/9 and fungal ear drops recommended after discharge (?not available inpatient). Chronic hypoxic respiratory failure: Monitor. Baseline 3.5 LNC.     CKD3. Near baseline (1.2-1.5). Monitor with diuresis.  Monitor.     Diabetes Mellitus, Type 2: Controlled. Continue basal-bolus Insulin regimen. Prandial insulin (5units with meals) added 3/10/22.  Monitor blood sugar and adjust regimen as needed. Diabetic (Carb-controlled) diet when able. CAD.  Aspirin, statin, carvedilol, ISMN. Morbid Obesity -  With Body mass index is 42.97 kg/m². Complicating assessment and treatment. Placing patient at risk for multiple co-morbidities as well as early death and contributing to the patient's presentation.     Incidental finding of small lung nodules.  Due to his smoking history, radiology recommended that he have another CT in 3-6 months.  Defer to PCP.     Of note, he is Jehova's witness and does not want transfusions.      Chronic Pain - continued lyrica 25mg BID and oxycodone 5mg q6hr prn pain. Confirmed on OARRS on 3/10/22. DVT Prophylaxis: Lovenox  Diet: Diet NPO  Code Status: Full Code  PT/OT Eval Status: Pending course    Dispo - likely 2-3 more days.     Keesha Mcneil MD

## 2022-03-10 NOTE — PLAN OF CARE
Problem: Falls - Risk of:  Goal: Will remain free from falls  Description: Will remain free from falls  Outcome: Ongoing  Note: Pt will remain free from falls throughout hospital stay. Fall precautions in place, bed alarm on, bed in lowest position with wheels locked and side rails 2/4 up. Room door open and hourly rounding completed. Will continue to monitor throughout shift. Problem: Pain:  Goal: Pain level will decrease  Description: Pain level will decrease  Outcome: Ongoing  Note: Pt will be satisfied with pain control. Pt uses numeric pain rating scale with reassessments after pain med administration. Will continue to monitor progression throughout shift. Problem: Serum Glucose Level - Abnormal:  Goal: Ability to maintain appropriate glucose levels will improve  Description: Ability to maintain appropriate glucose levels will improve  Outcome: Ongoing  Note: Pt will have accuchecks before meals and at bedtime with sliding scale insulin in place for coverage. Will continue to monitor for signs and symptoms of hypoglycemia and hyperglycemia throughout shift.

## 2022-03-10 NOTE — PROGRESS NOTES
Patient refusing bed alarm at this time. Patient stated \"I will call for help to the bathroom but from bed to chair is limiting my person freedom. \" Patient educated on importance of bed alarm for patient's safety. Patient verbalized understanding but continues to refuse bed alarms.

## 2022-03-10 NOTE — PLAN OF CARE
Problem: OXYGENATION/RESPIRATORY FUNCTION  Goal: Patient will maintain patent airway  Outcome: Ongoing  Note:   Patient's EF (Ejection Fraction) is greater than 40%    Heart Failure Medications:  Diuretics[de-identified] Furosemide    (One of the following REQUIRED for EF </= 40%/SYSTOLIC FAILURE but MAY be used in EF% >40%/DIASTOLIC FAILURE)        ACE[de-identified] None        ARB[de-identified] None         ARNI[de-identified] None    (Beta Blockers)  NON- Evidenced Based Beta Blocker (for EF% >40%/DIASTOLIC FAILURE): None    Evidenced Based Beta Blocker::(REQUIRED for EF% <40%/SYSTOLIC FAILURE) Carvedilol- Coreg  . .................................................................................................................................................. Patient's weights and intake/output reviewed: Yes    Patient's Last Weight: 289 lbs obtained by standing scale. Difference of 5 lbs less than last documented weight. Intake/Output Summary (Last 24 hours) at 3/9/2022 2143  Last data filed at 3/9/2022 2051  Gross per 24 hour   Intake 480 ml   Output 1875 ml   Net -1395 ml       Comorbidities Reviewed Yes    Patient has a past medical history of Anemia, Angina, Arthritis, CAD (coronary artery disease), CHF (congestive heart failure) ([facility] CKD (chronic kidney disease) stage 3, GFR 30-59 ml/min ([facility]), Clostridium difficile diarrhea, Diabetes mellitus ([facility] Diabetic neuropathy ([facility] Disease of blood and blood forming organ, Dizziness, GERD (gastroesophageal reflux disease), Headache, Hearing loss, Hyperlipidemia, Hypertension, Kidney stone, Refusal of blood transfusions as patient is Evangelical, Sleep apnea, Tinnitus, Venous ulcer ([address] and Wound, open.      >>For CHF and Comorbidity documentation on Education Time and Topics, please see Education Tab    Progressive Mobility Assessment:  What is this patient's Current Level of Mobility?: Ambulatory- with Assistance  How was this patient Mobilized today?: Edge of Bed, Up to Chair, Bedside Commode, Up to Toilet/Shower, and Up in Room, ambulated 50 ft                 With Whom? Nurse, PCA, PT, and OT                 Level of Difficulty/Assistance: 1x Assist     Pt resting in bed at this time on  4 L O2. Pt denies shortness of breath. Pt with pitting lower extremity edema.      Patient and/or Family's stated Goal of Care this Admission: reduce shortness of breath, increase activity tolerance, better understand heart failure and disease management, be more comfortable, and reduce lower extremity edema prior to discharge        :

## 2022-03-10 NOTE — PROGRESS NOTES
[address] [facility] SURGERY  PROGRESS NOTE      Patient Name: Tona Aldrich  Medical Record Number:  [id number]  Primary Care Physician:  Dennie Muzzy, APRN - CNP  Date of Consultation: 3/10/2022    Chief Complaint: Left ear otalgia    INTERVAL HISTORY  Jacobo Saunders is a(n) 78 y.o. male who presents to the hospital with CHF exacerbation, with left ear otalgia - no improvement with antibiotic eardrops, Augmentin. Left ear suctioned/debrided yesterday - significant improvement in pain, using warm compresses to the left temporomandibular joint.      Patient Active Problem List   Diagnosis    DM (diabetes mellitus) ([address]    Coronary artery disease involving native coronary artery of native heart without angina pectoris    Anemia of chronic disease    Essential hypertension    Hyperkalemia    Chronic diastolic heart failure ([address]    Pulmonary hypertension due to left ventricular diastolic dysfunction ([address]    Morbid obesity ([address]    S/P CABG x 3    DM2 (diabetes mellitus, type 2) ([address]    Morbid obesity with BMI of 50.0-59.9, adult ([address]    HLD (hyperlipidemia)    Cardiomyopathy ([address]    Productive cough    Chronic pain    Severe obstructive sleep apnea    Obesity, Class III, BMI 40-49.9 (morbid obesity) (HCC)    Acute diastolic congestive heart failure (HCC)    Degeneration of lumbar or lumbosacral intervertebral disc    Displacement of lumbar intervertebral disc without myelopathy    Lumbosacral spondylosis without myelopathy    Lumbar facet arthropathy    Disc displacement, lumbar    Spinal stenosis of lumbar region    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)     Past Surgical History:   Procedure Laterality Date   Angelic Jayshree CARDIAC SURGERY      Dec 27 2010, triple bypass    CHOLECYSTECTOMY  2011    HERNIA REPAIR  age2    OTHER SURGICAL HISTORY  11-16-11 REPAIR LOWER STERNAL INCISION, REMOVAL OF ONE STERNAL WIRE,    OTHER SURGICAL HISTORY  10/10/2014    phacoemulsification of cataract with intraocular lens implant left eye    PAIN MANAGEMENT PROCEDURE N/A 2/10/2022    MIDLINE CERVICAL SIX SEVEN EPIDURAL STEROID INJECTION SITE CONFIRMED BY FLUOROSCOPY performed by Nena Quezada MD at [facility] OR    UPPER GASTROINTESTINAL ENDOSCOPY       Family History   Problem Relation Age of Onset    Heart Disease Mother     Heart Disease Father     Cancer Father     Diabetes Brother     Diabetes Brother      Social History     Socioeconomic History    Marital status: Single     Spouse name: Not on file    Number of children: Not on file    Years of education: Not on file    Highest education level: Not on file   Occupational History    Not on file   Tobacco Use    Smoking status: Former Smoker     Packs/day: 1.00     Years: 16.00     Pack years: 16.00     Quit date: 1/10/1988     Years since quittin.1    Smokeless tobacco: Never Used    Tobacco comment: 22 yrs ago   Vaping Use    Vaping Use: Never used   Substance and Sexual Activity    Alcohol use: No     Alcohol/week: 0.0 standard drinks    Drug use: No    Sexual activity: Not on file   Other Topics Concern    Not on file   Social History Narrative    Not on file     Social Determinants of Health     Financial Resource Strain:     Difficulty of Paying Living Expenses: Not on file   Food Insecurity:     Worried About Running Out of Food in the Last Year: Not on file    James rob Food in the Last Year: Not on file   Transportation Needs:     Lack of Transportation (Medical): Not on file    Lack of Transportation (Non-Medical): Not on file   Physical Activity:     Days of Exercise per Week: Not on file    Minutes of Exercise per Session: Not on file   Stress:     Feeling of Stress : Not on file   Social Connections:     Frequency of Communication with Friends and Family: Not on file    Frequency of Social Gatherings with Friends and Family: Not on file    Attends Caodaism Services: Not on file    Active Member of [address] [place] [address] Popular Pays or Organizations: Not on file    Attends Club or Organization Meetings: Not on file    Marital Status: Not on file   Intimate Partner Violence:     Fear of Current or Ex-Partner: Not on file    Emotionally Abused: Not on file    Physically Abused: Not on file    Sexually Abused: Not on file   Housing Stability:     Unable to Pay for Housing in the Last Year: Not on file    Number of [place] in the Last Year: Not on file    Unstable Housing in the Last Year: Not on file       DRUG/FOOD ALLERGIES: Amitriptyline, Celecoxib, Cymbalta [duloxetine hcl], Elavil [amitriptyline hcl], Gabapentin, and Nsaids    CURRENT MEDICATIONS  Prior to Admission medications    Medication Sig Start Date End Date Taking? Authorizing Provider   ciprofloxacin (CILOXAN) 0.3 % ophthalmic solution Apply 4 drops twice daily to the left ear 3/1/22   Ginny Collins MD   aspirin-acetaminophen-caffeine (EXCEDRIN MIGRAINE) 285-342-40 MG per tablet Take 1 tablet by mouth every 6 hours as needed for Headaches    Historical Provider, MD   oxyCODONE-acetaminophen (PERCOCET) 5-325 MG per tablet Take 1 tablet by mouth 4 times daily for 30 days.  2/28/22 3/30/22  Padmini Ni, PATRICK - CNP   hydrALAZINE (APRESOLINE) 10 MG tablet Take 1 tablet by mouth every 8 hours 1/17/22   Em Leyva MD   torsemide (DEMADEX) 100 MG tablet Take half tablet twice per day, but on days when your weight is above 282 lbs, take a full tablet twice per day. 1/17/22   Vania Castaneda MD   naloxone 4 MG/0.1ML LIQD nasal spray 1 spray by Nasal route as needed for Opioid Reversal 11/23/21   Lenore Stanley MD   carvedilol (COREG) 12.5 MG tablet Take 1 tablet by mouth 2 times daily (with meals) 8/31/21   PATRICK Short CNP   spironolactone (ALDACTONE) 25 MG tablet Take 1 tablet by mouth daily 9/1/21   PATRICK Short CNP   metOLazone (ZAROXOLYN) 2.5 MG tablet Take 1 tablet by mouth every other day 8/31/21   PATRICK Short CNP   atorvastatin (LIPITOR) 40 MG tablet Take 1 tablet by mouth nightly 8/31/21   PATRICK Short CNP   linaCLOtide (LINZESS PO) Take by mouth    Historical Provider, MD   clotrimazole (LOTRIMIN) 1 % external solution Apply 4 drops to the left ear twice daily (morning and evening) for 10 days 10/5/20   Henna Rush MD   acetaminophen (TYLENOL) 500 MG tablet Take 2 tablets by mouth 4 times daily as needed for Pain 9/17/20   PATRICK Valdivia CNP   hydrocortisone (ANUSOL-HC) 2.5 % rectal cream Place rectally 2 times daily.  8/7/19   PATRICK Mchugh CNP   senna (SENOKOT) 8.6 MG TABS tablet Take 1 tablet by mouth 2 times daily 7/8/19   PATRICK Medeiros CNP   docusate sodium (COLACE, DULCOLAX) 100 MG CAPS Take 100 mg by mouth 2 times daily 5/1/18   PATRICK Chakraborty CNP   insulin aspart (NOVOLOG) 100 UNIT/ML injection vial Inject into the skin 3 times daily (before meals) 50 units with breakfast, 20 units with lunch and 60 units with dinner    Historical Provider, MD   allopurinol (ZYLOPRIM) 100 MG tablet Take 300 mg by mouth     Historical Provider, MD   insulin glargine (LANTUS) 100 UNIT/ML injection vial Inject 55 Units into the skin 2 times daily  Patient taking differently: Inject 80 Units into the skin 2 times daily  1/15/17   Annie Patino MD   Compression Stockings MISC by Does not apply route 2 pair, knee high compression stockings, fitted/ zippered 6/15/16   PATRICK Mendosa CNP   silver sulfADIAZINE (SILVADENE) 1 % cream Apply to BL lower leg ulcers daily 2/9/16   Vasu Ramirez MD   nitroGLYCERIN (NITROSTAT) 0.4 MG SL tablet Place 1 tablet under the tongue every 5 minutes as needed 8/17/15   PATRICK Mendosa CNP   fluticasone (FLONASE) 50 MCG/ACT nasal spray 1 spray by Nasal route nightly as needed. Historical Provider, MD   aspirin 81 MG chewable tablet Take 1 tablet by mouth daily. 1/11/13   Ailin Patel MD   isosorbide mononitrate (IMDUR) 30 MG CR tablet Take 1 tablet by mouth daily. 1/11/13   Ailin Patel MD   ferrous sulfate 325 (65 FE) MG tablet Take 325 mg by mouth 3 times daily (with meals). 12/22/10   Historical Provider, MD   omeprazole (PRILOSEC) 20 MG capsule Take 20 mg by mouth 2 times daily.  12/22/10   Historical Provider, MD       PHYSICAL EXAM  /64   Pulse 93   Temp 98.1 °F (36.7 °C) (Oral)   Resp 16   Ht 5' 9\" (1.753 m)   Wt 281 lb 4.8 oz (127.6 kg)   SpO2 93%   BMI 41.54 kg/m²     GENERAL: No Acute Distress, Alert and Oriented, no hoarseness  EYES: EOMI, Anti-icteric  NOSE: No epistaxis, nasal mucosa within normal limits, no purulent drainage  EARS: Left preauricular pain - no further fungal hyphae/debris noted in the left EAC - tympanic membrane with 30-40% central perforation  Right EAC clear, TM intact, no middle ear effusions  FACE: 1/6 House-Brackmann Scale, symmetric, sensation equal bilaterally    LABS  Lab Results   Component Value Date    WBC 7.8 03/07/2022    HGB 10.6 (L) 03/07/2022    HCT 32.1 (L) 03/07/2022    MCV 86.3 03/07/2022     03/07/2022     Lab Results   Component Value Date     03/10/2022    K 3.6 03/10/2022    K 3.7 03/07/2022    CL 97 03/10/2022    CO2 34 03/10/2022    BUN 52 03/10/2022    CREATININE 1.2 03/10/2022    GLUCOSE 199 03/10/2022    CALCIUM 9.5 03/10/2022        ASSESSMENT/PLAN  Debrasetheliot Dickens is a very pleasant 78 y.o. male with left acute fungal otitis externa  -Ears debrided  -Keep ears dry - no q-tips, water exposure  -Continue warm compresses to the left temporomandibular joint  -Will follow

## 2022-03-10 NOTE — PROGRESS NOTES
[address]   Daily Progress Note    Admit Date:  3/7/2022  HPI:    Chief Complaint   Patient presents with    Shortness of Breath     patient with dyspnea on exertion since last evening. Patient denies pain, home O2 level of 3.5lnc. Interval history: Mary Kate Bautista is being followed for shortness of breath. Treated for CHF. Currently on Lasix infusion. Completed stress testing. Subjective:  Mr. Art Mei denies any chest pain, prior to admission. Breathing is stable. Continues with edema. Discusses lyrica dosing. Completed stress testing today.      Objective:   BP (!) 145/69   Pulse 65   Temp 98.1 °F (36.7 °C) (Oral)   Resp 18   Ht 5' 9\" (1.753 m)   Wt 281 lb 4.8 oz (127.6 kg)   SpO2 95%   BMI 41.54 kg/m²       Intake/Output Summary (Last 24 hours) at 3/10/2022 1219  Last data filed at 3/10/2022 1159  Gross per 24 hour   Intake 480 ml   Output 2975 ml   Net -2495 ml       NYHA: III    Physical Exam:  General:  Awake, alert, NAD, obese  Skin:  Warm and dry  Neck:  JVD difficult to assess due to body habitus  Chest:  Clear to auscultation, no wheezes/rhonchi/rales  Cardiovascular:  RRR S1S2, + murmur no r/g   Abdomen:  Soft, nontender, +bowel sounds  Extremities:  +  bilateral lower extremity edema    Medications:    pregabalin  25 mg Oral BID    insulin lispro  5 Units SubCUTAneous TID     enoxaparin  30 mg SubCUTAneous BID    carvedilol  6.25 mg Oral BID WC    linaclotide  290 mcg Oral QAM AC    allopurinol  300 mg Oral Daily    aspirin  81 mg Oral Daily    atorvastatin  40 mg Oral Nightly    hydrALAZINE  10 mg Oral 3 times per day    isosorbide mononitrate  30 mg Oral Daily    pantoprazole  40 mg Oral QAM AC    senna  1 tablet Oral BID    sodium chloride flush  5-40 mL IntraVENous 2 times per day    insulin glargine  60 Units SubCUTAneous BID    insulin lispro  0-18 Units SubCUTAneous TID WC    insulin lispro  0-9 Units SubCUTAneous Nightly      furosemide (LASIX) 1mg/ml infusion 5 mg/hr (03/10/22 0256)    dextrose      sodium chloride         Lab Data:  CBC:   Recent Labs     03/07/22  1457   WBC 7.8   HGB 10.6*        BMP:    Recent Labs     03/08/22  0710 03/09/22  0720 03/10/22  0704    142 141   K 3.8 3.7 3.6   CO2 34* 33* 34*   BUN 75* 64* 52*   CREATININE 1.7* 1.5* 1.2     INR:  No results for input(s): INR in the last 72 hours. BNP:    Recent Labs     03/07/22  1457 03/10/22  0704   PROBNP 1,723* 1,280*     Lab Results   Component Value Date    LVEF 58 08/25/2021       Testing:  Echo 1/2022  Limited only f/u for LVEF and aortic valve stenosis. Technically difficult examination. Low normal left ventricular systolic function with ejection fraction of 50%. Moderate aortic stenosis. Aortic stenosis values appear approximately same when compared to echo on 8-. Mild to moderate aortic regurgitation. Stress test 3/10/22      Summary    Abnormal moderate risk myocardial perfusion study.   Dotty Pilon is a large area of mixed ischemia and scar within the infero-apical,    apical-lateral, lateral, posterior-lateral, and posterior-basal segments.    The left ventricular size is moderately dilated.    The estimated left ventricular function is 67%.      03/10/22 0507 281 lb 4.8 oz (127.6 kg) Actual;Standing scale     03/09/22 0350 286 lb 9.6 oz (130 kg) Standing scale     03/08/22 03:44:58 291 lb (132 kg) Actual;Standing scale     03/07/22 2008 293 lb 6.4 oz (133.1 kg) Standing scale     03/07/22 1342 283 lb (128.4 kg) Stated           Principal Problem:    Acute on chronic diastolic CHF (congestive heart failure) (HCC)  Active Problems:    DM (diabetes mellitus) ([facility] [address]    Essential hypertension    Morbid obesity ([facility] [address]  Resolved Problems:    * No resolved hospital problems.  *      Assessment:  Acute on chronic diastolic heart failure  Elevated troponin  HTN  MOderate aortic stenosis  CAD s/p CABG 2010, s/p LHC 2012 patent grafts  DM  MARGARITO on CKD  Obese  HENNA  Anemia      Plan:  Okay to eat  Stress test with moderate risk study. Given underlying MARGARITO on CKD along with medical non-compliance/follow up recommend medical management of CAD. Patient has likely had some progression of his Native CAD, however is not/has not been having any chest pain or angina. Continue aspirin  Adjust Lipitor to 80mg daily   Continue coreg 6.25mg BID  Hydralazine and nitrates  continue lasix infusion 5mg/hr as his renal function continues to improve with diuresis     Discussed with DR. Jacqui Olivares   Dispo: 1-2 more days     Gustavo Aguero, APRN - CNP,  3/10/2022, 12:47 PM

## 2022-03-10 NOTE — PROGRESS NOTES
Difficulty/Assistance: 1x Assist     Pt resting in bed at this time on 3 L O2. Pt denies shortness of breath. Pt with pitting lower extremity edema.      Patient and/or Family's stated Goal of Care this Admission: reduce shortness of breath, increase activity tolerance, better understand heart failure and disease management, be more comfortable and reduce lower extremity edema prior to discharge        :

## 2022-03-10 NOTE — PROGRESS NOTES
MD paged, \"Pt states he takes Lyrica 25mg BID. This however is not his home med list. Can we still have this ordered for him? Pt complaining of neuropathy in the legs. Thank you. \"

## 2022-03-11 ENCOUNTER — TELEPHONE (OUTPATIENT)
Dept: PULMONOLOGY | Age: 67
End: 2022-03-11

## 2022-03-11 LAB
ANION GAP SERPL CALCULATED.3IONS-SCNC: 11 MMOL/L (ref 3–16)
BASOPHILS ABSOLUTE: 0 K/UL (ref 0–0.2)
BASOPHILS RELATIVE PERCENT: 0.5 %
BLOOD CULTURE, ROUTINE: NORMAL
BUN BLDV-MCNC: 49 MG/DL (ref 7–20)
CALCIUM SERPL-MCNC: 10.1 MG/DL (ref 8.3–10.6)
CHLORIDE BLD-SCNC: 96 MMOL/L (ref 99–110)
CO2: 34 MMOL/L (ref 21–32)
CREAT SERPL-MCNC: 1.3 MG/DL (ref 0.8–1.3)
CULTURE, BLOOD 2: NORMAL
EOSINOPHILS ABSOLUTE: 0.3 K/UL (ref 0–0.6)
EOSINOPHILS RELATIVE PERCENT: 3.4 %
GFR AFRICAN AMERICAN: >60
GFR NON-AFRICAN AMERICAN: 55
GLUCOSE BLD-MCNC: 112 MG/DL (ref 70–99)
GLUCOSE BLD-MCNC: 128 MG/DL (ref 70–99)
GLUCOSE BLD-MCNC: 140 MG/DL (ref 70–99)
GLUCOSE BLD-MCNC: 165 MG/DL (ref 70–99)
GLUCOSE BLD-MCNC: 298 MG/DL (ref 70–99)
HCT VFR BLD CALC: 35 % (ref 40.5–52.5)
HEMOGLOBIN: 11.2 G/DL (ref 13.5–17.5)
LYMPHOCYTES ABSOLUTE: 1 K/UL (ref 1–5.1)
LYMPHOCYTES RELATIVE PERCENT: 12.2 %
MAGNESIUM: 1.9 MG/DL (ref 1.8–2.4)
MCH RBC QN AUTO: 27.4 PG (ref 26–34)
MCHC RBC AUTO-ENTMCNC: 32.1 G/DL (ref 31–36)
MCV RBC AUTO: 85.3 FL (ref 80–100)
MONOCYTES ABSOLUTE: 0.6 K/UL (ref 0–1.3)
MONOCYTES RELATIVE PERCENT: 7.7 %
NEUTROPHILS ABSOLUTE: 5.9 K/UL (ref 1.7–7.7)
NEUTROPHILS RELATIVE PERCENT: 76.2 %
PDW BLD-RTO: 17.6 % (ref 12.4–15.4)
PERFORMED ON: ABNORMAL
PLATELET # BLD: 257 K/UL (ref 135–450)
PMV BLD AUTO: 7.8 FL (ref 5–10.5)
POTASSIUM SERPL-SCNC: 4.1 MMOL/L (ref 3.5–5.1)
RBC # BLD: 4.1 M/UL (ref 4.2–5.9)
SODIUM BLD-SCNC: 141 MMOL/L (ref 136–145)
WBC # BLD: 7.8 K/UL (ref 4–11)

## 2022-03-11 PROCEDURE — 6370000000 HC RX 637 (ALT 250 FOR IP): Performed by: INTERNAL MEDICINE

## 2022-03-11 PROCEDURE — 1200000000 HC SEMI PRIVATE

## 2022-03-11 PROCEDURE — 83735 ASSAY OF MAGNESIUM: CPT

## 2022-03-11 PROCEDURE — 97535 SELF CARE MNGMENT TRAINING: CPT

## 2022-03-11 PROCEDURE — 6360000002 HC RX W HCPCS: Performed by: INTERNAL MEDICINE

## 2022-03-11 PROCEDURE — 97110 THERAPEUTIC EXERCISES: CPT

## 2022-03-11 PROCEDURE — 97161 PT EVAL LOW COMPLEX 20 MIN: CPT

## 2022-03-11 PROCEDURE — 99231 SBSQ HOSP IP/OBS SF/LOW 25: CPT | Performed by: OTOLARYNGOLOGY

## 2022-03-11 PROCEDURE — 36415 COLL VENOUS BLD VENIPUNCTURE: CPT

## 2022-03-11 PROCEDURE — 94761 N-INVAS EAR/PLS OXIMETRY MLT: CPT

## 2022-03-11 PROCEDURE — 6370000000 HC RX 637 (ALT 250 FOR IP): Performed by: NURSE PRACTITIONER

## 2022-03-11 PROCEDURE — 85025 COMPLETE CBC W/AUTO DIFF WBC: CPT

## 2022-03-11 PROCEDURE — 97166 OT EVAL MOD COMPLEX 45 MIN: CPT

## 2022-03-11 PROCEDURE — 80048 BASIC METABOLIC PNL TOTAL CA: CPT

## 2022-03-11 PROCEDURE — 6370000000 HC RX 637 (ALT 250 FOR IP): Performed by: FAMILY MEDICINE

## 2022-03-11 PROCEDURE — 99232 SBSQ HOSP IP/OBS MODERATE 35: CPT | Performed by: NURSE PRACTITIONER

## 2022-03-11 PROCEDURE — 2580000003 HC RX 258: Performed by: INTERNAL MEDICINE

## 2022-03-11 PROCEDURE — 2700000000 HC OXYGEN THERAPY PER DAY

## 2022-03-11 PROCEDURE — 97530 THERAPEUTIC ACTIVITIES: CPT

## 2022-03-11 RX ADMIN — ENOXAPARIN SODIUM 30 MG: 30 INJECTION SUBCUTANEOUS at 21:32

## 2022-03-11 RX ADMIN — FUROSEMIDE 5 MG/HR: 10 INJECTION, SOLUTION INTRAMUSCULAR; INTRAVENOUS at 18:57

## 2022-03-11 RX ADMIN — ISOSORBIDE MONONITRATE 30 MG: 30 TABLET, EXTENDED RELEASE ORAL at 10:23

## 2022-03-11 RX ADMIN — HYDRALAZINE HYDROCHLORIDE 10 MG: 10 TABLET, FILM COATED ORAL at 21:31

## 2022-03-11 RX ADMIN — PANTOPRAZOLE SODIUM 40 MG: 40 TABLET, DELAYED RELEASE ORAL at 10:23

## 2022-03-11 RX ADMIN — ALLOPURINOL 300 MG: 300 TABLET ORAL at 10:23

## 2022-03-11 RX ADMIN — SODIUM CHLORIDE, PRESERVATIVE FREE 10 ML: 5 INJECTION INTRAVENOUS at 21:33

## 2022-03-11 RX ADMIN — INSULIN GLARGINE 60 UNITS: 100 INJECTION, SOLUTION SUBCUTANEOUS at 21:32

## 2022-03-11 RX ADMIN — SENNOSIDES 8.6 MG: 8.6 TABLET, COATED ORAL at 10:23

## 2022-03-11 RX ADMIN — HYDRALAZINE HYDROCHLORIDE 10 MG: 10 TABLET, FILM COATED ORAL at 05:06

## 2022-03-11 RX ADMIN — ACETAMINOPHEN 650 MG: 325 TABLET ORAL at 10:24

## 2022-03-11 RX ADMIN — PREGABALIN 25 MG: 25 CAPSULE ORAL at 21:31

## 2022-03-11 RX ADMIN — OXYCODONE AND ACETAMINOPHEN 1 TABLET: 5; 325 TABLET ORAL at 15:52

## 2022-03-11 RX ADMIN — PREGABALIN 25 MG: 25 CAPSULE ORAL at 10:23

## 2022-03-11 RX ADMIN — INSULIN LISPRO 9 UNITS: 100 INJECTION, SOLUTION INTRAVENOUS; SUBCUTANEOUS at 12:47

## 2022-03-11 RX ADMIN — INSULIN LISPRO 5 UNITS: 100 INJECTION, SOLUTION INTRAVENOUS; SUBCUTANEOUS at 10:27

## 2022-03-11 RX ADMIN — CARVEDILOL 6.25 MG: 6.25 TABLET, FILM COATED ORAL at 17:35

## 2022-03-11 RX ADMIN — ATORVASTATIN CALCIUM 80 MG: 80 TABLET, FILM COATED ORAL at 21:31

## 2022-03-11 RX ADMIN — INSULIN GLARGINE 60 UNITS: 100 INJECTION, SOLUTION SUBCUTANEOUS at 10:28

## 2022-03-11 RX ADMIN — CARVEDILOL 6.25 MG: 6.25 TABLET, FILM COATED ORAL at 10:23

## 2022-03-11 RX ADMIN — INSULIN LISPRO 2 UNITS: 100 INJECTION, SOLUTION INTRAVENOUS; SUBCUTANEOUS at 21:32

## 2022-03-11 RX ADMIN — INSULIN LISPRO 5 UNITS: 100 INJECTION, SOLUTION INTRAVENOUS; SUBCUTANEOUS at 12:47

## 2022-03-11 RX ADMIN — SENNOSIDES 8.6 MG: 8.6 TABLET, COATED ORAL at 21:32

## 2022-03-11 RX ADMIN — OXYCODONE AND ACETAMINOPHEN 1 TABLET: 5; 325 TABLET ORAL at 00:00

## 2022-03-11 RX ADMIN — ASPIRIN 81 MG 81 MG: 81 TABLET ORAL at 10:23

## 2022-03-11 ASSESSMENT — PAIN SCALES - GENERAL
PAINLEVEL_OUTOF10: 8
PAINLEVEL_OUTOF10: 8
PAINLEVEL_OUTOF10: 7
PAINLEVEL_OUTOF10: 8

## 2022-03-11 ASSESSMENT — PAIN DESCRIPTION - PAIN TYPE: TYPE: ACUTE PAIN

## 2022-03-11 ASSESSMENT — PAIN - FUNCTIONAL ASSESSMENT: PAIN_FUNCTIONAL_ASSESSMENT: PREVENTS OR INTERFERES SOME ACTIVE ACTIVITIES AND ADLS

## 2022-03-11 ASSESSMENT — PAIN DESCRIPTION - PROGRESSION
CLINICAL_PROGRESSION: GRADUALLY WORSENING

## 2022-03-11 ASSESSMENT — PAIN DESCRIPTION - LOCATION: LOCATION: EAR

## 2022-03-11 ASSESSMENT — PAIN DESCRIPTION - ORIENTATION: ORIENTATION: LEFT

## 2022-03-11 NOTE — TELEPHONE ENCOUNTER
I recommend patient treat severe sleep apnea. BiPAP titration was ordered at last visit which I would recommend he complete if he is having trouble tolerating BiPAP.

## 2022-03-11 NOTE — PROGRESS NOTES
Physical Therapy    Facility/Department: [facility] A2 CARD TELEMETRY  Initial Assessment and Treatment    NAME: Jose Lazo  : 1955  MRN: [id number]    Date of Service: 3/11/2022    Discharge Recommendations:  Home with assist PRN,Home with Home health PT   PT Equipment Recommendations  Equipment Needed: No  Other: patient said he already has a walker at home. If pt is unable to be seen after this session, please let this note serve as discharge summary. Please see case management note for discharge disposition. Thank you. Assessment   Body structures, Functions, Activity limitations: Decreased functional mobility ; Decreased endurance;Decreased posture; Increased pain  Assessment: Patient is a 75year old male who was admitted to [facility] on 3/7/22 with CHF and left ear pain. Patient is normally able to ambulate household distances with a walker. Today the patient ambulated 15 feet x 2 with a rolling walker and SBA. Patient functioning below baseline and would benefit from skilled therapy to address current deficits mentioned above. Recommend home PT to address his therapy deficits. Patient is safe for home, when medically stable, with PRN supervision/assist, Home PT and continued use of his walker. PT to continue to follow. Treatment Diagnosis: Decreased independence with functional mobility  Specific instructions for Next Treatment: progress mobility as tolerated  Prognosis: Good  Decision Making: Low Complexity  PT Education: Goals; Functional Mobility Training;Precautions;PT Role;Transfer Training;Pressure Relief; Injury Prevention;Gait Training;Equipment;Plan of Care;General Safety; Disease Specific Education;Home Exercise Program  Patient Education: Disease Specific Education: Patient educated on importance of OOB mobility, prevention of complications of bedrest, and general safety during hospitalization.  Patient verbalized understanding  Barriers to Learning: none  REQUIRES PT FOLLOW UP: Yes  Activity Tolerance  Activity Tolerance: Patient Tolerated treatment well;Patient limited by pain  Activity Tolerance: Vitals: 129/67 64 BPM 94% on 2.5 L       Patient Diagnosis(es): The primary encounter diagnosis was Acute on chronic congestive heart failure, unspecified heart failure type ([address] Diagnoses of TAYLOR (dyspnea on exertion) and Acute otitis media, unspecified otitis media type were also pertinent to this visit. has a past medical history of Anemia, Angina, Arthritis, CAD (coronary artery disease), CHF (congestive heart failure) ([address] CKD (chronic kidney disease) stage 3, GFR 30-59 ml/min ([facility]), Clostridium difficile diarrhea, Diabetes mellitus ([address] Diabetic neuropathy ([address] Disease of blood and blood forming organ, Dizziness, GERD (gastroesophageal reflux disease), Headache, Hearing loss, Hyperlipidemia, Hypertension, Kidney stone, Refusal of blood transfusions as patient is Oriental orthodox, Sleep apnea, Tinnitus, Venous ulcer ([address] and Wound, open. has a past surgical history that includes hernia repair (age1); Cholecystectomy (9/2011); other surgical history (11-16-11 REPAIR LOWER STERNAL INCISION, REMOVAL OF ONE STERNAL WIRE,); Upper gastrointestinal endoscopy; Cardiac surgery; other surgical history (10/10/2014); and Pain management procedure (N/A, 2/10/2022). Restrictions  Restrictions/Precautions  Restrictions/Precautions: General Precautions,Fall Risk  Position Activity Restriction  Other position/activity restrictions: Up with assist., IV, 2.5 L O 2, telemetry  Vision/Hearing  Vision: Impaired  Vision Exceptions: Legally blind;Wears glasses for reading  Hearing: Within functional limits     Subjective  General  Chart Reviewed: Yes  Patient assessed for rehabilitation services?: Yes  Additional Pertinent Hx: HPI per chart, Keesha Thompson is a(n) 75 y.o. male who presents to the hospital with CHF exacerbation, with left ear otalgia - no improvement with antibiotic eardrops, Augmentin.  Left ear suctioned/debrided\"  Response To Previous Treatment: Not applicable  Family / Caregiver Present: No  Referring Practitioner: Alfa Miller MD  Referral Date : 03/11/22  Follows Commands: Within Functional Limits  General Comment  Comments: Seated at edge of bed upon entry of therapy. Subjective  Subjective: Cleared for therapy by RN. Patient agreed to participate. Pain Screening  Patient Currently in Pain: Yes  Pain Assessment  Pain Assessment: 0-10  Pain Level: 8  Pain Type: Acute pain  Pain Location: Ear  Pain Orientation: Left  Functional Pain Assessment: Prevents or interferes some active activities and ADLs  Non-Pharmaceutical Pain Intervention(s): Ambulation/Increased Activity;Repositioned; Rest  Response to Pain Intervention: Patient Satisfied  Vital Signs  Patient Currently in Pain: Yes  Pre Treatment Pain Screening  Intervention List: Patient able to continue with treatment    Orientation  Orientation  Overall Orientation Status: Within Functional Limits  Social/Functional History  Social/Functional History  Lives With: Alone  Type of Home: Apartment  Home Layout: One level  Home Access: Stairs to enter without rails  Entrance Stairs - Number of Steps: 2  Bathroom Shower/Tub: Tub/Shower unit  Bathroom Toilet: Standard  Bathroom Equipment: Grab bars around toilet,Grab bars in shower  Bathroom Accessibility: Not accessible  Home Equipment: Cane,4 wheeled walker (electric recliner(Not a LiFT chair))  Receives Help From: Friend(s),Other (comment) (friend assists with grocery shopping; [facility] Services for cleaning & laundry every 2 weeks)  ADL Assistance: Independent (with increased time to [employer])  Ambulation Assistance: Independent (with [employer])  Occupation: Retired  Type of occupation: deli resturant owner  Additional Comments: sleeps in [address] [facility]; denies falls  Cognition   Cognition  Overall Cognitive Status: Exceptions  Arousal/Alertness: Appropriate responses to stimuli  Following Commands:  Follows Strengthening,Functional Mobility Training,Equipment Evaluation, Education, & procurement,Safety Education & Training,Gait Training,ROM,Transfer Training,Balance [place] Management,Patient/Caregiver Education & Training,Home Exercise Program  Safety Devices  Type of devices: All fall risk precautions in place,Bed alarm in place,Gait belt,Call light within reach,Patient at risk for falls,Left in bed,Nurse notified    AM-PAC Score     AM-PAC Inpatient Mobility without Stair Climbing Raw Score : 17 (03/11/22 1807)  AM-PAC Inpatient without Stair Climbing T-Scale Score : 48.47 (03/11/22 1807)  Mobility Inpatient CMS 0-100% Score: 32.72 (03/11/22 1807)  Mobility Inpatient without Stair CMS G-Code Modifier : Em Ana M (03/11/22 1807)       Goals  Short term goals  Time Frame for Short term goals: 1 week 3/18/22  Short term goal 1: Sit <> stand with modified independence  Short term goal 2: Bed <> chair with least restrictive assistive device and modified independence  Short term goal 3: Ambulate 150 feet with least restrictive assistive device and modified independence. Short term goal 4: Ascend 3 steps with rail and modified independence  Short term goal 5: By 3/15/22 Patient will tolerate 12-15 reps of his exercises to maximize his strength/endurance  Patient Goals   Patient goals : To become stronger. To go to assisted living.        Therapy Time   Individual Concurrent Group Co-treatment   Time In 3890         Time Out 1544         Minutes 27         Timed Code Treatment Minutes: 17 Minutes (10 minute evaluation)       Tara Topete, PT

## 2022-03-11 NOTE — PLAN OF CARE
Problem: OXYGENATION/RESPIRATORY FUNCTION  Goal: Patient will maintain patent airway  Outcome: Ongoing  Note:   Patient's EF (Ejection Fraction) is greater than 40%    Heart Failure Medications:  Diuretics[de-identified] Furosemide    (One of the following REQUIRED for EF </= 40%/SYSTOLIC FAILURE but MAY be used in EF% >40%/DIASTOLIC FAILURE)        ACE[de-identified] None        ARB[de-identified] None         ARNI[de-identified] None    (Beta Blockers)  NON- Evidenced Based Beta Blocker (for EF% >40%/DIASTOLIC FAILURE): None    Evidenced Based Beta Blocker::(REQUIRED for EF% <40%/SYSTOLIC FAILURE) Carvedilol- Coreg  . .................................................................................................................................................. Patient's weights and intake/output reviewed: Yes    Patient's Last Weight: 281 lbs obtained by standing scale. Difference of 5 lbs less than last documented weight. Intake/Output Summary (Last 24 hours) at 3/10/2022 2207  Last data filed at 3/10/2022 2144  Gross per 24 hour   Intake 1040 ml   Output 3100 ml   Net -2060 ml       Comorbidities Reviewed Yes    Patient has a past medical history of Anemia, Angina, Arthritis, CAD (coronary artery disease), CHF (congestive heart failure) ([facility] [address] CKD (chronic kidney disease) stage 3, GFR 30-59 ml/min ([facility]), Clostridium difficile diarrhea, Diabetes mellitus ([facility] [address] Diabetic neuropathy ([facility] [address] Disease of blood and blood forming organ, Dizziness, GERD (gastroesophageal reflux disease), Headache, Hearing loss, Hyperlipidemia, Hypertension, Kidney stone, Refusal of blood transfusions as patient is Latter-day, Sleep apnea, Tinnitus, Venous ulcer ([facility] [address] and Wound, open.      >>For CHF and Comorbidity documentation on Education Time and Topics, please see Education Tab    Progressive Mobility Assessment:  What is this patient's Current Level of Mobility?: Ambulatory-Up Ad Mami  How was this patient Mobilized today?: Edge of Bed, Up to Chair, Bedside Commode, and Up to Toilet/Shower, ambulated 48 ft                 With Whom? Nurse, PCA, PT, OT, and Self                 Level of Difficulty/Assistance: Independent     Pt resting in bed at this time on  4 L O2. Pt denies shortness of breath. Pt with pitting lower extremity edema. Patient and/or Family's stated Goal of Care this Admission: reduce shortness of breath, increase activity tolerance, better understand heart failure and disease management, be more comfortable, and reduce lower extremity edema prior to discharge        :      Problem: Serum Glucose Level - Abnormal:  Goal: Ability to maintain appropriate glucose levels will improve  Description: Ability to maintain appropriate glucose levels will improve  3/10/2022 2208 by Jayne Champion RN  Outcome: Ongoing  Note: Pt will have accuchecks before meals and at bedtime with sliding scale insulin in place for coverage. Will continue to monitor for signs and symptoms of hypoglycemia and hyperglycemia throughout shift.

## 2022-03-11 NOTE — DISCHARGE INSTR - COC
Continuity of Care Form    Patient Name: Mando Collazo   :  1955  MRN:  [id number]    [facility] date:  3/7/2022  Discharge date:  3/14/2022    Code Status Order: Full Code   Advance Directives:      Admitting Physician:  No admitting provider for patient encounter.   PCP: PATRICK oCnteh CNP    Discharging Nurse: [facility] Unit/Room#: 0206/0206-02  Discharging Unit Phone Number: [phone]    Emergency Contact:   Extended Emergency Contact Information  Primary Emergency Contact: [address] Phone: [phone]  Mobile Phone: [phone]  Relation: Other  Secondary Emergency Contact: China Clemons[address] Phone: [phone]  Relation: Other    Past Surgical History:  Past Surgical History:   Procedure Laterality Date    CARDIAC SURGERY      Dec 27 2010, triple bypass    CHOLECYSTECTOMY  2011    HERNIA REPAIR  age2    OTHER SURGICAL HISTORY  11 REPAIR LOWER STERNAL INCISION, REMOVAL OF ONE STERNAL WIRE,    OTHER SURGICAL HISTORY  10/10/2014    phacoemulsification of cataract with intraocular lens implant left eye    PAIN MANAGEMENT PROCEDURE N/A 2/10/2022    MIDLINE CERVICAL SIX SEVEN EPIDURAL STEROID INJECTION SITE CONFIRMED BY FLUOROSCOPY performed by Queen Monica MD at [address] ENDOSCOPY         Immunization History:   Immunization History   Administered Date(s) Administered    COVID-19, Pfizer Purple top, DILUTE for use, 12+ yrs, 30mcg/0.3mL dose 2021, 2021, 2021    Influenza Virus Vaccine 2012, 10/02/2017, 10/01/2018, 2019, 2020    Influenza, High Dose (Fluzone 65 yrs and older) 10/01/2019    Influenza, MDCK Quadv, IM, PF (Flucelvax 2 yrs and older) 10/05/2018    Influenza, Quadv, IM, PF (6 mo and older Fluzone, Flulaval, Fluarix, and 3 yrs and older Afluria) 2016, 10/02/2017, 2020    Influenza, Quadv, adjuvanted, 65 yrs +, IM, PF (Fluad) 09/29/2020    Influenza, Triv, inactivated, subunit, adjuvanted, IM (Fluad 65 yrs and older) 09/13/2019    Pneumococcal Conjugate 13-valent (Natalijerilyn Owenswolf) 09/13/2019    Pneumococcal Polysaccharide ([id number]) 01/04/2011, 11/27/2014, 12/17/2020       Active Problems:  Patient Active Problem List   Diagnosis Code    DM (diabetes mellitus) ([facility] E11.9    Coronary artery disease involving native coronary artery of native heart without angina pectoris I25.10    Anemia of chronic disease D63.8    Essential hypertension I10    Hyperkalemia E87.5    Chronic diastolic heart failure ([facility]) I50.32    Pulmonary hypertension due to left ventricular diastolic dysfunction ([facility]) I27.22    Morbid obesity ([facility]) E66.01    S/P CABG x 3 Z95.1    DM2 (diabetes mellitus, type 2) ([facility]) E11.9    Morbid obesity with BMI of 50.0-59.9, adult ([facility] E66.01, Z68.43    HLD (hyperlipidemia) E78.5    Cardiomyopathy ([facility] I42.9    Productive cough R05.8    Chronic pain G89.29    Severe obstructive sleep apnea G47.33    Obesity, Class III, BMI 40-49.9 (morbid obesity) ([facility]) K94.17    Acute diastolic congestive heart failure ([facility]) I50.31    Degeneration of lumbar or lumbosacral intervertebral disc M51.37    Displacement of lumbar intervertebral disc without myelopathy M51.26    Lumbosacral spondylosis without myelopathy M47.817    Lumbar facet arthropathy M47.816    Disc displacement, lumbar M51.26    Spinal stenosis of lumbar region M48.061    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    Tympanic membrane perforation, left H72.92    Chest pain R07.9    Cor pulmonale, chronic ([facility]) I27.81    Pulmonary hypertension due to alveolar hypoventilation disorder ([facility]) I27.23    Nonrheumatic aortic valve stenosis I35.0    Chronic neck pain M54.2, G89.29    Dyspnea F30.62    Acute diastolic CHF (congestive heart failure) ([facility]) I50.31    Cervical stenosis of spinal canal M48.02 Degeneration of cervical intervertebral disc M50.30    Cervical radiculitis M54.12    Acute on chronic diastolic CHF (congestive heart failure) ([facility]) I50.33       Isolation/Infection:   Isolation            No Isolation          Patient Infection Status       Infection Onset Added Last Indicated Last Indicated By Review Planned Expiration Resolved Resolved By    None active    Resolved    COVID-19 (Rule Out) 03/07/22 03/07/22 03/07/22 COVID-19, Rapid (Ordered)   03/07/22 Rule-Out Test Resulted    COVID-19 (Rule Out) 01/13/22 01/13/22 01/13/22 SARS-CoV-2 NAAT (Rapid) (Ordered)   01/13/22 Rule-Out Test Resulted    COVID-19 (Rule Out) 08/23/21 08/23/21 08/23/21 COVID-19, Rapid (Ordered)   08/23/21 Rule-Out Test Resulted            Nurse Assessment:  Last Vital Signs: /66   Pulse 67   Temp 98.5 °F (36.9 °C) (Oral)   Resp 18   Ht 5' 9\" (1.753 m)   Wt 281 lb 14.4 oz (127.9 kg)   SpO2 95%   BMI 41.63 kg/m²     Last documented pain score (0-10 scale): Pain Level: 8  Last Weight:   Wt Readings from Last 1 Encounters:   03/11/22 281 lb 14.4 oz (127.9 kg)     Mental Status:  oriented, alert, coherent, logical, thought processes intact, and able to concentrate and follow conversation    IV Access:  - None    Nursing Mobility/ADLs:  Walking   Independent  Transfer  Independent  Bathing  Independent  Dressing  Independent  [address]  Med Delivery   whole    Wound Care Documentation and Therapy:  Wound 01/14/22 Leg Left; Lower; Inner (Active)   Number of days: 55       Wound 01/14/22 Leg Left; Lower; Lateral (Active)   Number of days: 55        Elimination:  Continence:    Bowel: Yes  Bladder: Yes  Urinary Catheter: None   Colostomy/Ileostomy/Ileal Conduit: No       Date of Last BM:     Intake/Output Summary (Last 24 hours) at 3/11/2022 1531  Last data filed at 3/11/2022 1359  Gross per 24 hour   Intake 920 ml   Output 2200 ml   Net -1280 ml     I/O last 3 completed shifts: In: 1520 [P.O.:1520]  Out: 1015 Peggy Mejia [Urine:6816]    Safety Concerns: At Risk for Falls    Impairments/Disabilities:      None    Nutrition Therapy:  Current Nutrition Therapy:   - Oral Diet:  Carb Control 4 carbs/meal (1800kcals/day)    Routes of Feeding: Oral  Liquids: No Restrictions  Daily Fluid Restriction: no  Last Modified Barium Swallow with Video (Video Swallowing Test): not done    Treatments at the Time of Hospital Discharge:   Respiratory Treatments: N/A  Oxygen Therapy:  is on oxygen at 3 L/min per nasal cannula. Ventilator:    - No ventilator support    Rehab Therapies: Physical Therapy and Occupational Therapy  Weight Bearing Status/Restrictions: No weight bearing restrictions  Other Medical Equipment (for information only, NOT a DME order):  walker  Other Treatments: N/A    Patient's personal belongings (please select all that are sent with patient):  Pt discharged with all belongings    RN SIGNATURE:  Electronically signed by María Eric RN on 3/14/22 at 4:36 PM EDT    CASE MANAGEMENT/SOCIAL WORK SECTION    Inpatient Status Date: 03/07/2022    Readmission Risk Assessment Score:  Readmission Risk              Risk of Unplanned Readmission:  26           Discharging to Facility/ [address] [place] [address]   [address] [place] [address] [id number] [place] [address] and [facility] for non skilled  [phone]  Fax:  [phone]  / signature: Electronically signed by Babatunde Walker RN on 3/14/22 at 11:44 AM EDT    PHYSICIAN SECTION    Prognosis: Fair    Condition at Discharge: Stable    Rehab Potential (if transferring to Rehab):  Fair    Recommended Labs or Other Treatments After Discharge:   -Keep ears dry - no q-tips, water exposure  -Continue warm compresses to the left temporomandibular joint  -Follow up in 1 week with ENT  -Follow up with cardiology as instructed    Physician Certification: I certify the above information and transfer of Margret Lepe  is necessary for the continuing treatment of the diagnosis listed and that he requires [address] for less 30 days.      Update Admission H&P: No change in H&P    PHYSICIAN SIGNATURE:  Electronically signed by Rk Rausch MD on 3/14/22 at 4:04 PM EDT

## 2022-03-11 NOTE — PROGRESS NOTES
Hospitalist Progress Note    Date of Admission: 3/7/2022    Chief Complaint: SOB    Hospital Course:   78 y.o. male who presented to [facility] with SOB. PMHx significant for former smoking, HTN, HLD, DM2, CAD s/p CABG in 2010, and dCHF. He has had recurrent hospitalizations for CHF, most recently 1/2022. He reports compliance with his diuretic regimen of Torsemide, Aldactone and Metolazone. He reports progressive increase in SOB/TAYLOR as well as worsening LE edema. He also notes recent issues with left ear infection. He was initially started on Cipro drops and later Augmentin (3/2/22) by ENT Dr. Carlotta Ramirez. He is concerned that the ear infection has not resolved as he has continued pain     Subjective: Pt tired today. Doesn't like chair or bed alarm. More PO. Labs:   Recent Labs     03/11/22  0635   WBC 7.8   HGB 11.2*   HCT 35.0*        Recent Labs     03/09/22  0720 03/10/22  0704 03/11/22  0635    141 141   K 3.7 3.6 4.1   CL 97* 97* 96*   CO2 33* 34* 34*   BUN 64* 52* 49*   CREATININE 1.5* 1.2 1.3   CALCIUM 9.5 9.5 10.1     No results for input(s): AST, ALT, BILIDIR, BILITOT, ALKPHOS in the last 72 hours. No results for input(s): INR in the last 72 hours. Physical Exam Performed:    /66   Pulse 67   Temp 98.5 °F (36.9 °C) (Oral)   Resp 18   Ht 5' 9\" (1.753 m)   Wt 281 lb 14.4 oz (127.9 kg)   SpO2 95%   BMI 41.63 kg/m²   General appearance:  No apparent distress, appears stated age and cooperative. Morbidly obese. HEENT:  Pupils equal, round, and reactive to light. Conjunctivae/corneas clear. Neck:  Supple, no jugular venous distention. Trachea midline with full range of motion. Respiratory:  Normal respiratory effort. Diminished but clear to auscultation, bilaterally without Rales/Wheezes/Rhonchi. Cardiovascular:  Regular rate and rhythm with normal S1/S2 without murmurs, rubs or gallops.   Abdomen:  Soft, non-tender, non-distended with normal bowel sounds. Musculoskelatal:  No clubbing, cyanosis. Improving LE edema bilaterally. Full range of motion without deformity. Generalized weakness. Neurologic:  Neurovascularly intact without any focal sensory/motor deficits. Cranial nerves: II-XII intact, grossly non-focal.  Psychiatric:  Alert and oriented, thought content appropriate, normal insight  Skin:  Skin color, texture, turgor normal.  No rashes or lesions. Capillary Refill:  Brisk,< 3 seconds   Peripheral Pulses:  +2 palpable, equal bilaterally     Assessment/Plan:    Active Hospital Problems    Diagnosis     Acute on chronic diastolic CHF (congestive heart failure) ([facility]) [I50.33]     Morbid obesity ([facility] [E66.01]     Essential hypertension [I10]     DM (diabetes mellitus) ([facility] [E11.9]      Acute on chronic diastolic CHF (congestive heart failure): Recurrent admissions for same, most recently 1/2022. Worsening dyspnea, LE edema, elevated BNP. CXR findings most consistent with CHF as appearance is similar to some of his recent admissions for CHF. Procal is borderline elevated but in setting of CKD. Do not feel he needs further Abx for Pneumonia, which has been ruled out. Cardiology following. Continue IV Diuresis with Lasix drip. Monitor I/O, weights, BMP. Plan for NM Stress test 3/10/22 - abnormal/moderate risk. Plan to medically manage due to MARGARITO on CKD and medical non-compliance. -4.9L. L Ear Otitis: He reports persistent left ear pain despite recent course of topical Cipro and PO Augmentin (started 3/2/22 by Dr. Carlotta Ramirez). Consulted ENT here and diagnosed with fungal otitis. S/p debridement on 3/9 and fungal ear drops recommended after discharge (?not available inpatient). Chronic hypoxic respiratory failure: Monitor. Baseline 3.5 LNC.     CKD3. Near baseline (1.2-1.5). Monitor with diuresis.  Monitor.     Diabetes Mellitus, Type 2: Controlled. Continue basal-bolus Insulin regimen. Prandial insulin (5units with meals) added 3/10/22.  Monitor blood sugar and adjust regimen as needed. Diabetic (Carb-controlled) diet when able. CAD.  Aspirin, statin, carvedilol, ISMN. Morbid Obesity -  With Body mass index is 42.97 kg/m². Complicating assessment and treatment. Placing patient at risk for multiple co-morbidities as well as early death and contributing to the patient's presentation.     Incidental finding of small lung nodules.  Due to his smoking history, radiology recommended that he have another CT in 3-6 months.  Defer to PCP.     Of note, he is Jehova's witness and does not want transfusions.      Chronic Pain - continued lyrica 25mg BID and oxycodone 5mg q6hr prn pain. Confirmed on OARRS on 3/10/22. DVT Prophylaxis: Lovenox  Diet: ADULT DIET; Regular; 4 carb choices (60 gm/meal); Low Fat/Low Chol/High Fiber/2 gm Na  Code Status: Full Code  PT/OT Eval Status: Pending course    Dispo - likely 2-3 more days.     Alfa Miller MD

## 2022-03-11 NOTE — PLAN OF CARE
Problem: Falls - Risk of:  Goal: Will remain free from falls  Description: Will remain free from falls  3/10/2022 2207 by Carie Carlson RN  Outcome: Ongoing  Note: Pt will remain free from falls throughout hospital stay. Fall precautions in place, bed alarm on, bed in lowest position with wheels locked and side rails 2/4 up. Room door open and hourly rounding completed. Will continue to monitor throughout shift. Problem: Pain:  Goal: Pain level will decrease  Description: Pain level will decrease  3/10/2022 2207 by Carie Carlson RN  Outcome: Ongoing  Note: Pt will be satisfied with pain control. Pt uses numeric pain rating scale with reassessments after pain med administration. Will continue to monitor progression throughout shift.

## 2022-03-11 NOTE — PROGRESS NOTES
[address] [facility] SURGERY  PROGRESS NOTE      Patient Name: Wanda Morales  Medical Record Number:  [id number]  Primary Care Physician:  PATRICK Dean CNP  Date of Consultation: 3/11/2022    Chief Complaint: Left ear otalgia    INTERVAL HISTORY  Nito Gastelum is a(n) 75 y.o. male who presents to the hospital with CHF exacerbation, with left ear otalgia - no improvement with antibiotic eardrops, Augmentin. Left ear suctioned/debrided yesterday - significant improvement in pain, using warm compresses to the left temporomandibular joint.      Patient Active Problem List   Diagnosis    DM (diabetes mellitus) ([address]    Coronary artery disease involving native coronary artery of native heart without angina pectoris    Anemia of chronic disease    Essential hypertension    Hyperkalemia    Chronic diastolic heart failure ([address]    Pulmonary hypertension due to left ventricular diastolic dysfunction ([address]    Morbid obesity ([address]    S/P CABG x 3    DM2 (diabetes mellitus, type 2) ([address]    Morbid obesity with BMI of 50.0-59.9, adult ([address]    HLD (hyperlipidemia)    Cardiomyopathy ([address]    Productive cough    Chronic pain    Severe obstructive sleep apnea    Obesity, Class III, BMI 40-49.9 (morbid obesity) (HCC)    Acute diastolic congestive heart failure (HCC)    Degeneration of lumbar or lumbosacral intervertebral disc    Displacement of lumbar intervertebral disc without myelopathy    Lumbosacral spondylosis without myelopathy    Lumbar facet arthropathy    Disc displacement, lumbar    Spinal stenosis of lumbar region    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)     Past Surgical History:   Procedure Laterality Date   [place] CARDIAC SURGERY      Dec 27 2010, triple bypass    CHOLECYSTECTOMY  2011    HERNIA REPAIR  age2    OTHER SURGICAL HISTORY  11-16-11 REPAIR LOWER STERNAL INCISION, REMOVAL OF ONE STERNAL WIRE,    OTHER SURGICAL HISTORY  10/10/2014    phacoemulsification of cataract with intraocular lens implant left eye    PAIN MANAGEMENT PROCEDURE N/A 2/10/2022    MIDLINE CERVICAL SIX SEVEN EPIDURAL STEROID INJECTION SITE CONFIRMED BY FLUOROSCOPY performed by Fletcher Vidal MD at [address] EG OR    UPPER GASTROINTESTINAL ENDOSCOPY       Family History   Problem Relation Age of Onset    Heart Disease Mother     Heart Disease Father     Cancer Father     Diabetes Brother     Diabetes Brother      Social History     Socioeconomic History    Marital status: Single     Spouse name: Not on file    Number of children: Not on file    Years of education: Not on file    Highest education level: Not on file   Occupational History    Not on file   Tobacco Use    Smoking status: Former Smoker     Packs/day: 1.00     Years: 16.00     Pack years: 16.00     Quit date: 1/10/1988     Years since quittin.1    Smokeless tobacco: Never Used    Tobacco comment: 22 yrs ago   Vaping Use    Vaping Use: Never used   Substance and Sexual Activity    Alcohol use: No     Alcohol/week: 0.0 standard drinks    Drug use: No    Sexual activity: Not on file   Other Topics Concern    Not on file   Social History Narrative    Not on file     Social Determinants of Health     Financial Resource Strain:     Difficulty of Paying Living Expenses: Not on file   Food Insecurity:     Worried About Running Out of Food in the Last Year: Not on file    James rob Food in the Last Year: Not on file   Transportation Needs:     Lack of Transportation (Medical): Not on file    Lack of Transportation (Non-Medical): Not on file   Physical Activity:     Days of Exercise per Week: Not on file    Minutes of Exercise per Session: Not on file   Stress:     Feeling of Stress : Not on file   Social Connections:     Frequency of Communication with Friends and Family: Not on file    Frequency of Social Gatherings with Friends and Family: Not on file    Attends Druze Services: Not on file    Active Member of [address] [place] [address] [employer] or Organizations: Not on file    Attends Club or Organization Meetings: Not on file    Marital Status: Not on file   Intimate Partner Violence:     Fear of Current or Ex-Partner: Not on file    Emotionally Abused: Not on file    Physically Abused: Not on file    Sexually Abused: Not on file   Housing Stability:     Unable to Pay for Housing in the Last Year: Not on file    Number of [place] in the Last Year: Not on file    Unstable Housing in the Last Year: Not on file       DRUG/FOOD ALLERGIES: Amitriptyline, Celecoxib, Cymbalta [duloxetine hcl], Elavil [amitriptyline hcl], Gabapentin, and Nsaids    CURRENT MEDICATIONS  Prior to Admission medications    Medication Sig Start Date End Date Taking? Authorizing Provider   ciprofloxacin (CILOXAN) 0.3 % ophthalmic solution Apply 4 drops twice daily to the left ear 3/1/22   Laura Valdes MD   aspirin-acetaminophen-caffeine (EXCEDRIN MIGRAINE) 078-327-76 MG per tablet Take 1 tablet by mouth every 6 hours as needed for Headaches    Historical Provider, MD   oxyCODONE-acetaminophen (PERCOCET) 5-325 MG per tablet Take 1 tablet by mouth 4 times daily for 30 days.  2/28/22 3/30/22  PATRICK Asher - CNP   hydrALAZINE (APRESOLINE) 10 MG tablet Take 1 tablet by mouth every 8 hours 1/17/22   Zechariah Friedman MD   torsemide (DEMADEX) 100 MG tablet Take half tablet twice per day, but on days when your weight is above 282 lbs, take a full tablet twice per day. 1/17/22   Kimberlee Salinas MD   naloxone 4 MG/0.1ML LIQD nasal spray 1 spray by Nasal route as needed for Opioid Reversal 11/23/21   John Ortiz MD   carvedilol (COREG) 12.5 MG tablet Take 1 tablet by mouth 2 times daily (with meals) 8/31/21   PATRICK Terrell CNP   spironolactone (ALDACTONE) 25 MG tablet Take 1 tablet by mouth daily 9/1/21   PATRICK Terrell CNP   metOLazone (ZAROXOLYN) 2.5 MG tablet Take 1 tablet by mouth every other day 8/31/21   PATRICK Terrell CNP   atorvastatin (LIPITOR) 40 MG tablet Take 1 tablet by mouth nightly 8/31/21   PATRICK Terrell CNP   linaCLOtide (LINZESS PO) Take by mouth    Historical Provider, MD   clotrimazole (LOTRIMIN) 1 % external solution Apply 4 drops to the left ear twice daily (morning and evening) for 10 days 10/5/20   Andres Dey MD   acetaminophen (TYLENOL) 500 MG tablet Take 2 tablets by mouth 4 times daily as needed for Pain 9/17/20   PATRICK Acosta CNP   hydrocortisone (ANUSOL-HC) 2.5 % rectal cream Place rectally 2 times daily.  8/7/19   Christopher Saucer, APRN - CNP   senna (SENOKOT) 8.6 MG TABS tablet Take 1 tablet by mouth 2 times daily 7/8/19   PATRICK Medeiros CNP   docusate sodium (COLACE, DULCOLAX) 100 MG CAPS Take 100 mg by mouth 2 times daily 5/1/18   PATRICK Andrea CNP   insulin aspart (NOVOLOG) 100 UNIT/ML injection vial Inject into the skin 3 times daily (before meals) 50 units with breakfast, 20 units with lunch and 60 units with dinner    Historical Provider, MD   allopurinol (ZYLOPRIM) 100 MG tablet Take 300 mg by mouth     Historical Provider, MD   insulin glargine (LANTUS) 100 UNIT/ML injection vial Inject 55 Units into the skin 2 times daily  Patient taking differently: Inject 80 Units into the skin 2 times daily  1/15/17   Angelica Patino MD   Compression Stockings MISC by Does not apply route 2 pair, knee high compression stockings, fitted/ zippered 6/15/16   PATRICK Arevalo CNP   silver sulfADIAZINE (SILVADENE) 1 % cream Apply to BL lower leg ulcers daily 2/9/16   Arti Ortega MD   nitroGLYCERIN (NITROSTAT) 0.4 MG SL tablet Place 1 tablet under the tongue every 5 minutes as needed 8/17/15   PATRICK Arevalo CNP   fluticasone (FLONASE) 50 MCG/ACT nasal spray 1 spray by Nasal route nightly as needed. Historical Provider, MD   aspirin 81 MG chewable tablet Take 1 tablet by mouth daily. 1/11/13   Cody Casas MD   isosorbide mononitrate (IMDUR) 30 MG CR tablet Take 1 tablet by mouth daily. 1/11/13   Cody Casas MD   ferrous sulfate 325 (65 FE) MG tablet Take 325 mg by mouth 3 times daily (with meals). 12/22/10   Historical Provider, MD   omeprazole (PRILOSEC) 20 MG capsule Take 20 mg by mouth 2 times daily.  12/22/10   Historical Provider, MD       PHYSICAL EXAM  /66   Pulse 67   Temp 98.5 °F (36.9 °C) (Oral)   Resp 18   Ht 5' 9\" (1.753 m)   Wt 281 lb 14.4 oz (127.9 kg)   SpO2 95%   BMI 41.63 kg/m²     GENERAL: No Acute Distress, Alert and Oriented, no hoarseness  EYES: EOMI, Anti-icteric  NOSE: No epistaxis, nasal mucosa within normal limits, no purulent drainage  EARS: Left preauricular pain - scant fungal hyphae noted in the left EAC - tympanic membrane with 30-40% central perforation  Right EAC clear, TM intact, no middle ear effusions  FACE: 1/6 House-Brackmann Scale, symmetric, sensation equal bilaterally    LABS  Lab Results   Component Value Date    WBC 7.8 03/11/2022    HGB 11.2 (L) 03/11/2022    HCT 35.0 (L) 03/11/2022    MCV 85.3 03/11/2022     03/11/2022     Lab Results   Component Value Date     03/11/2022    K 4.1 03/11/2022    K 3.7 03/07/2022    CL 96 03/11/2022    CO2 34 03/11/2022    BUN 49 03/11/2022    CREATININE 1.3 03/11/2022    GLUCOSE 140 03/11/2022    CALCIUM 10.1 03/11/2022        ASSESSMENT/PLAN  Bronson Friendly is a very pleasant 75 y.o. male with left acute fungal otitis externa  -Ears debrided  -Keep ears dry - no q-tips, water exposure  -Continue warm compresses to the left temporomandibular joint  -Will prescribe clotrimazole gtt as outpatient - will debride ears on 3/14/22 if patient is still admitted

## 2022-03-11 NOTE — PROGRESS NOTES
Occupational Therapy   Occupational Therapy Initial/discharge Assessment  1 x only    Date: 3/11/2022   Patient Name: Skyler Thomas  MRN: [id number]     : 1955    Date of Service: 3/11/2022    Discharge Recommendations:  Home with assist PRN       Assessment      OT Education: OT Role;Plan of Care;Precautions  Patient Education: disease specific: importance of OOB to chair for meals & ADL's; use of RED/nurse call light for Assist with transfers  Barriers to Learning: decreased vision/memory  No Skilled OT: At baseline function  REQUIRES OT FOLLOW UP: No  Activity Tolerance  Activity Tolerance: Patient Tolerated treatment well  Activity Tolerance: sitting EOB on 2. 5 L O 2:  BP = 129/67,95 % O 2 sats  Safety Devices  Safety Devices in place: Yes  Type of devices: Call light within reach; Left in bed;Nurse notified           Patient Diagnosis(es): The primary encounter diagnosis was Acute on chronic congestive heart failure, unspecified heart failure type ([facility] [address] Diagnoses of TAYLOR (dyspnea on exertion) and Acute otitis media, unspecified otitis media type were also pertinent to this visit. has a past medical history of Anemia, Angina, Arthritis, CAD (coronary artery disease), CHF (congestive heart failure) ([address] CKD (chronic kidney disease) stage 3, GFR 30-59 ml/min ([facility]), Clostridium difficile diarrhea, Diabetes mellitus ([address] Diabetic neuropathy ([address] Disease of blood and blood forming organ, Dizziness, GERD (gastroesophageal reflux disease), Headache, Hearing loss, Hyperlipidemia, Hypertension, Kidney stone, Refusal of blood transfusions as patient is Tenriism, Sleep apnea, Tinnitus, Venous ulcer ([address] and Wound, open. has a past surgical history that includes hernia repair (age2);  Cholecystectomy (2011); other surgical history (-- REPAIR LOWER STERNAL INCISION, REMOVAL OF ONE STERNAL WIRE,); Upper gastrointestinal endoscopy; Cardiac surgery; other surgical history (10/10/2014); and Pain management procedure (N/A, 2/10/2022). Restrictions  Restrictions/Precautions  Restrictions/Precautions: General Precautions,Fall Risk  Position Activity Restriction  Other position/activity restrictions: Up with assist., IV, 2.5 L O 2, telemetry    Subjective   General  Chart Reviewed: Yes  Patient assessed for rehabilitation services?: Yes  Family / Caregiver Present: No  Referring Practitioner: Dr. Michelle Landers  Diagnosis: Dyspnea on exertion, Acute on chronic CHF  General Comment  Comments: RN cleared pt for OT eval; pt resting in bed agreeable to therapy  Patient Currently in Pain: Yes  Pain Assessment  Pain Assessment: 0-10  Pain Level: 7  Pain Type: Acute pain  Pain Location: Ear  Pain Orientation: Left  Functional Pain Assessment: Prevents or interferes some active activities and ADLs  Non-Pharmaceutical Pain Intervention(s): Ambulation/Increased Activity;Repositioned; Rest  Response to Pain Intervention: Patient Satisfied    Social/Functional History  Social/Functional History  Lives With: Alone  Type of Home: Apartment  Home Layout: One level  Home Access: Stairs to enter without rails  Entrance Stairs - Number of Steps: 2  Bathroom Shower/Tub: Tub/Shower unit  Bathroom Toilet: Standard  Bathroom Equipment: Grab bars around toilet,Grab bars in shower  Bathroom Accessibility: Not accessible  Home Equipment: Cane,4 wheeled walker (electric recliner(Not a LiFT chair))  Receives Help From: Friend(s),Other (comment) (friend assists with grocery shopping; [facility] for cleaning & laundry every 2 weeks)  ADL Assistance: Independent (with increased time to [employer])  Ambulation Assistance: Independent (with [employer])  Occupation: Retired  Type of occupation: deli resturant owner  Additional Comments: sleeps in [address] [place] [address]; denies falls       Objective   Vision: Impaired  Vision Exceptions: Legally blind;Wears glasses for reading  Hearing: Within functional limits    Orientation  Overall Orientation Status: Within Functional Limits     Balance  Sitting Balance: Supervision  Standing Balance: Stand by assistance (with RW due to O 2 tubing & IV pole)  Standing Balance  Time: 1-2 minutes x 2  Activity: bathroom mobility  Functional Mobility  Functional - Mobility Device: Rolling Walker  Activity: To/from bathroom  Assist Level: Stand by assistance  ADL  Grooming: Stand by assistance  LE Dressing: Maximum assistance (to doff/starla socks)  Toileting: Stand by assistance (standing at toilet to urinate)    RUE Tone: Normotonic  LUE Tone: Normotonic     Bed mobility  Supine to Sit: Modified independent  Sit to Supine: Modified independent  Transfers  Sit to stand: Stand by assistance  Stand to sit: Stand by assistance     Vision - Basic Assessment  Prior Vision: Wears glasses only for reading     Cognition  Overall Cognitive Status: Exceptions  Arousal/Alertness: Appropriate responses to stimuli  Following Commands:  Follows one step commands with increased time  Attention Span: Appears intact  Memory: Decreased short term memory  Safety Judgement: Decreased awareness of need for safety  Insights: Fully aware of deficits  Initiation: Requires cues for some  Sequencing: Does not require cues        Sensation  Overall Sensation Status: Impaired (neuropathy in bilateral legs)     Plan   OT eval only      AM-PAC Score        AM-PAC Inpatient Daily Activity Raw Score: 19 (03/11/22 1633)  AM-PAC Inpatient ADL T-Scale Score : 40.22 (03/11/22 1633)  ADL Inpatient CMS 0-100% Score: 42.8 (03/11/22 1633)  ADL Inpatient CMS G-Code Modifier : CK (03/11/22 1633)    Goals  Patient Goals   Patient goals : go home       Therapy Time   Individual Concurrent Group Co-treatment   Time In [address]         Time Out 1545         Minutes y 12 & Janee Jeff,Uli.  [address] [place] [address]

## 2022-03-11 NOTE — PROGRESS NOTES
[facility]  Cardiology  Progress Note    Admission date:  3/7/2022    Reason for follow up visit: CHF    HPI/CC: Gene Jeff is a 75 y.o. male who was admitted 3/7/2022 for shortness of breath. He has been treated for CHF. Medical management for abnormal stress test. Rhythm has been sinus. Subjective: Feeling better, shortness of breath improved. No chest pain. On 4L NC    Vitals:  Blood pressure 118/66, pulse 67, temperature 98.5 °F (36.9 °C), temperature source Oral, resp. rate 18, height 5' 9\" (1.753 m), weight 281 lb 14.4 oz (127.9 kg), SpO2 95 %.   Temp  Av.1 °F (36.7 °C)  Min: 97.7 °F (36.5 °C)  Max: 98.5 °F (36.9 °C)  Pulse  Av.8  Min: 62  Max: 93  BP  Min: 117/64  Max: 141/80  SpO2  Av.3 %  Min: 93 %  Max: 99 %    24 hour I/O    Intake/Output Summary (Last 24 hours) at 3/11/2022 1332  Last data filed at 3/11/2022 1108  Gross per 24 hour   Intake 1040 ml   Output 1550 ml   Net -510 ml     Current Facility-Administered Medications   Medication Dose Route Frequency Provider Last Rate Last Admin    pregabalin (LYRICA) capsule 25 mg  25 mg Oral BID Cathi Fernandez MD   25 mg at 22 1023    insulin lispro (HUMALOG) injection vial 5 Units  5 Units SubCUTAneous TID  Cathi Fernandez MD   5 Units at 22 1247    atorvastatin (LIPITOR) tablet 80 mg  80 mg Oral Nightly PATRICK Terrell - CNP   80 mg at 03/10/22 2136    furosemide (LASIX) 100 mg in dextrose 5 % 100 mL infusion  5 mg/hr IntraVENous Continuous Devang Goldberg MD 5 mL/hr at 03/10/22 1720 5 mg/hr at 03/10/22 1720    enoxaparin (LOVENOX) injection 30 mg  30 mg SubCUTAneous BID Alicia Llamas MD   30 mg at 03/10/22 2136    carvedilol (COREG) tablet 6.25 mg  6.25 mg Oral BID  Devang Goldberg MD   6.25 mg at 22 1023    linaclotide (LINZESS) capsule 290 mcg  290 mcg Oral QAM AC Alicia Llamas MD        allopurinol (ZYLOPRIM) tablet 300 mg  300 mg Oral Daily Alicia Llamas MD 300 mg at 03/11/22 1023    aspirin chewable tablet 81 mg  81 mg Oral Daily Donald Lewis MD   81 mg at 03/11/22 1023    hydrALAZINE (APRESOLINE) tablet 10 mg  10 mg Oral 3 times per day Donald Lewis MD   10 mg at 03/11/22 0506    isosorbide mononitrate (IMDUR) extended release tablet 30 mg  30 mg Oral Daily Donald Lewis MD   30 mg at 03/11/22 1023    pantoprazole (PROTONIX) tablet 40 mg  40 mg Oral QAM AC Donald Lewis MD   40 mg at 03/11/22 1023    oxyCODONE-acetaminophen (PERCOCET) 5-325 MG per tablet 1 tablet  1 tablet Oral Q6H PRN Donald Lewis MD   1 tablet at 03/11/22 0000    senna (SENOKOT) tablet 8.6 mg  1 tablet Oral BID Donald Lewis MD   8.6 mg at 03/11/22 1023    glucose (GLUTOSE) 40 % oral gel 15 g  15 g Oral PRN Donald Lewis MD        glucagon (rDNA) injection 1 mg  1 mg IntraMUSCular PRN Donald Lweis MD        dextrose 5 % solution  100 mL/hr IntraVENous PRN Donald Lewis MD        sodium chloride flush 0.9 % injection 5-40 mL  5-40 mL IntraVENous 2 times per day Donald Lewis MD   10 mL at 03/08/22 0819    sodium chloride flush 0.9 % injection 5-40 mL  5-40 mL IntraVENous PRLITTLE Lewis MD        0.9 % sodium chloride infusion  25 mL IntraVENous PRLITTLE Lewis MD        ondansetron (ZOFRAN-ODT) disintegrating tablet 4 mg  4 mg Oral Q8H PRN Donald Lewis MD        Or    ondansetron [facility]) injection 4 mg  4 mg IntraVENous Q6H PRLITTLE Lewis MD        polyethylene glycol [facility]) packet 17 g  17 g Oral Daily PRN Donald Lewis MD        acetaminophen (TYLENOL) tablet 650 mg  650 mg Oral Q6H PRN Donald Lewis MD   650 mg at 03/11/22 1024    Or    acetaminophen (TYLENOL) suppository 650 mg  650 mg Rectal Q6H PRN Donald Lewis MD        insulin glargine (LANTUS) injection vial 60 Units  60 Units SubCUTAneous BID Donald Lewis MD   60 Units at 03/11/22 1028    insulin lispro (HUMALOG) injection vial 0-18 Units  0-18 Units SubCUTAneous TID WC Alicia Llamas MD   9 Units at 03/11/22 1247    insulin lispro (HUMALOG) injection vial 0-9 Units  0-9 Units SubCUTAneous Nightly Alicia Llamas MD   6 Units at 03/10/22 2137    dextrose bolus (hypoglycemia) 10% 125 mL  125 mL IntraVENous PRN Alicia Llamas MD        Or    dextrose bolus (hypoglycemia) 10% 250 mL  250 mL IntraVENous PRN Alicia Llamas MD         Review of Systems    Objective:     Telemetry monitor: SR    Physical Exam:  Constitutional:  Comfortable and alert, NAD, appears older than stated age  Eyes: PERRL, sclera nonicteric  Neck:  Supple, no masses, no thyroidmegaly, JVD difficult to assess  Skin:  Warm and dry; no rash or lesions  Heart: Regular, normal apex, S1 and S2 normal, no M/G/R  Lungs:  Normal respiratory effort; clear; no wheezing/rhonchi/rales  Abdomen: soft, non tender, + bowel sounds  Extremities:  Trace BLE edema  Neuro: alert and oriented, moves legs and arms equally, normal mood and affect    Data Reviewed:    Echo 1/2022  Limited only f/u for LVEF and aortic valve stenosis. Technically difficult examination. Low normal left ventricular systolic function with ejection fraction of 50%. Moderate aortic stenosis. Aortic stenosis values appear approximately same when compared to echo on 8-. Mild to moderate aortic regurgitation.     Stress test 3/10/22       Summary    Abnormal moderate risk myocardial perfusion study.    There is a large area of mixed ischemia and scar within the infero-apical,    apical-lateral, lateral, posterior-lateral, and posterior-basal segments.    The left ventricular size is moderately dilated.    The estimated left ventricular function is 67%.      Echo 8/25/2021:   Technically difficult examination.   Normal left ventricular systolic function with ejection fraction of 55-60%.   Paradoxic septal motion   Mild concentric left ventricular hypertrophy.   Left ventricular diastolic filling pressure is elevated.   Compared to previous study from 4- no changes noted in left   ventricular function.   Mild mitral and pulmonic regurgitation.   Bi-atrial enlargement.   Moderate aortic stenosis, difficult to fully evaluate on this study   The right ventricle is moderately enlarged.   Right ventricular systolic function is moderately reduced .   Mild to moderate tricuspid regurgitation.   Systolic pulmonic artery pressure (SPAP) is estimated at 65 mmHg consistent   with severe pulmonary hypertension (Right atrial pressure of 15 mmHg),   however difficult to accurately measure pressures on this study.   The right atrium is moderately dilated.     Coronary angiogram 2012:  1.  The right coronary artery proximally has an 80% lesion.  Distally there  is a 90% lesion.    2.  The left main artery has luminal irregularities.  The LAD is occluded  proximally. 3.  The circumflex artery has 2 large marginal arteries that have diffuse 40%  lesion.    4.  The KAVYA to the distal LAD is widely patent.    5.  The SVG to the first diagonal is widely patent.    6.  The SVG to the RPL is widely patent. IMPRESSION:   1.  Normal left ventricular function with normal cardiac output.    2.  Severely elevated left ventricular end diastolic pressure. 3.  Normal cardiac output.    4.  Severe pulmonary venous hypertension.    PLAN:    1.  Continue diuresis and afterload reduction.    2.  Salt restriction. 3.  Evaluation with a sleep study.     Lab Reviewed:     Renal Profile:  Lab Results   Component Value Date    CREATININE 1.3 03/11/2022    BUN 49 03/11/2022     03/11/2022    K 4.1 03/11/2022    K 3.7 03/07/2022    CL 96 03/11/2022    CO2 34 03/11/2022     CBC:    Lab Results   Component Value Date    WBC 7.8 03/11/2022    RBC 4.10 03/11/2022    HGB 11.2 03/11/2022    HCT 35.0 03/11/2022    MCV 85.3 03/11/2022    RDW 17.6 03/11/2022     03/11/2022     BNP:    Lab Results   Component Value Date    PROBNP 1,280 03/10/2022    PROBNP 1,723 03/07/2022    PROBNP 1,606 01/13/2022    PROBNP 1,000 08/29/2021    PROBNP 1,593 08/26/2021     Fasting Lipid Panel:    Lab Results   Component Value Date    CHOL 104 08/24/2021    HDL 33 08/24/2021    HDL 31 01/11/2012    TRIG 110 08/24/2021     Cardiac Enzymes:  CK/MbTroponin  Lab Results   Component Value Date    CKTOTAL 149 11/16/2012    TROPONINI 0.05 03/08/2022     PT/ INR   Lab Results   Component Value Date    INR 1.22 09/17/2015    INR 1.18 11/24/2014    INR 1.10 01/05/2013    PROTIME 13.3 09/17/2015    PROTIME 12.9 11/24/2014    PROTIME 12.5 01/05/2013     PTT No results found for: PTT   Lab Results   Component Value Date    MG 1.90 03/11/2022      Lab Results   Component Value Date    TSH 0.64 08/24/2021     All labs and imaging reviewed today    Assessment:  Elevated troponin  Acute on chronic diastolic CHF: 7.8T and -12 lbs  CAD: abnormal stress test 3/10/2022; patent grafts on angiogram 2012; s/p CABG 2011  Aortic stenosis: moderate  Atrial tachycardia: ongoing but stable, evaluated by EP, no AF/aflutter noted  Pulmonary HTN  HTN  HLD  DM  CKD  HENNA  Obesity  Anemia    Plan:   1. Continue lasix gtt today given stable renal function, likely change to po tomorrow  2. Continue aspirin, statin, carvedilol, hydralazine, nitrates  3. Medical management for abnormal stress test given anemia, A/CKD, lack of angina  4.  Monitor BMP    Colt Messina, APRN-CNP  [facility]  [phone]

## 2022-03-12 LAB
ANION GAP SERPL CALCULATED.3IONS-SCNC: 12 MMOL/L (ref 3–16)
BUN BLDV-MCNC: 47 MG/DL (ref 7–20)
CALCIUM SERPL-MCNC: 9.4 MG/DL (ref 8.3–10.6)
CHLORIDE BLD-SCNC: 95 MMOL/L (ref 99–110)
CO2: 30 MMOL/L (ref 21–32)
CREAT SERPL-MCNC: 1.3 MG/DL (ref 0.8–1.3)
GFR AFRICAN AMERICAN: >60
GFR NON-AFRICAN AMERICAN: 55
GLUCOSE BLD-MCNC: 105 MG/DL (ref 70–99)
GLUCOSE BLD-MCNC: 155 MG/DL (ref 70–99)
GLUCOSE BLD-MCNC: 200 MG/DL (ref 70–99)
GLUCOSE BLD-MCNC: 213 MG/DL (ref 70–99)
GLUCOSE BLD-MCNC: 244 MG/DL (ref 70–99)
MAGNESIUM: 1.7 MG/DL (ref 1.8–2.4)
PERFORMED ON: ABNORMAL
POTASSIUM SERPL-SCNC: 3.9 MMOL/L (ref 3.5–5.1)
SODIUM BLD-SCNC: 137 MMOL/L (ref 136–145)

## 2022-03-12 PROCEDURE — 94761 N-INVAS EAR/PLS OXIMETRY MLT: CPT

## 2022-03-12 PROCEDURE — 6370000000 HC RX 637 (ALT 250 FOR IP): Performed by: INTERNAL MEDICINE

## 2022-03-12 PROCEDURE — 2580000003 HC RX 258: Performed by: INTERNAL MEDICINE

## 2022-03-12 PROCEDURE — 36415 COLL VENOUS BLD VENIPUNCTURE: CPT

## 2022-03-12 PROCEDURE — 6360000002 HC RX W HCPCS: Performed by: INTERNAL MEDICINE

## 2022-03-12 PROCEDURE — 83735 ASSAY OF MAGNESIUM: CPT

## 2022-03-12 PROCEDURE — 80048 BASIC METABOLIC PNL TOTAL CA: CPT

## 2022-03-12 PROCEDURE — 6370000000 HC RX 637 (ALT 250 FOR IP): Performed by: NURSE PRACTITIONER

## 2022-03-12 PROCEDURE — 6370000000 HC RX 637 (ALT 250 FOR IP): Performed by: FAMILY MEDICINE

## 2022-03-12 PROCEDURE — 2700000000 HC OXYGEN THERAPY PER DAY

## 2022-03-12 PROCEDURE — 99233 SBSQ HOSP IP/OBS HIGH 50: CPT | Performed by: NURSE PRACTITIONER

## 2022-03-12 PROCEDURE — 1200000000 HC SEMI PRIVATE

## 2022-03-12 RX ORDER — SPIRONOLACTONE 25 MG/1
25 TABLET ORAL DAILY
Status: DISCONTINUED | OUTPATIENT
Start: 2022-03-12 | End: 2022-03-14 | Stop reason: HOSPADM

## 2022-03-12 RX ORDER — BUTALBITAL, ACETAMINOPHEN AND CAFFEINE 50; 325; 40 MG/1; MG/1; MG/1
1 TABLET ORAL EVERY 6 HOURS PRN
Status: DISPENSED | OUTPATIENT
Start: 2022-03-12 | End: 2022-03-14

## 2022-03-12 RX ORDER — BUTALBITAL, ACETAMINOPHEN AND CAFFEINE 50; 325; 40 MG/1; MG/1; MG/1
1 TABLET ORAL ONCE
Status: COMPLETED | OUTPATIENT
Start: 2022-03-12 | End: 2022-03-12

## 2022-03-12 RX ORDER — TORSEMIDE 100 MG/1
50 TABLET ORAL 2 TIMES DAILY
Status: DISCONTINUED | OUTPATIENT
Start: 2022-03-12 | End: 2022-03-14 | Stop reason: HOSPADM

## 2022-03-12 RX ADMIN — ASPIRIN 81 MG 81 MG: 81 TABLET ORAL at 09:34

## 2022-03-12 RX ADMIN — INSULIN LISPRO 6 UNITS: 100 INJECTION, SOLUTION INTRAVENOUS; SUBCUTANEOUS at 09:35

## 2022-03-12 RX ADMIN — INSULIN LISPRO 5 UNITS: 100 INJECTION, SOLUTION INTRAVENOUS; SUBCUTANEOUS at 17:43

## 2022-03-12 RX ADMIN — BUTALBITAL, ACETAMINOPHEN, AND CAFFEINE 1 TABLET: 50; 325; 40 TABLET ORAL at 17:42

## 2022-03-12 RX ADMIN — INSULIN LISPRO 5 UNITS: 100 INJECTION, SOLUTION INTRAVENOUS; SUBCUTANEOUS at 09:34

## 2022-03-12 RX ADMIN — SENNOSIDES 8.6 MG: 8.6 TABLET, COATED ORAL at 09:33

## 2022-03-12 RX ADMIN — ALLOPURINOL 300 MG: 300 TABLET ORAL at 09:34

## 2022-03-12 RX ADMIN — OXYCODONE AND ACETAMINOPHEN 1 TABLET: 5; 325 TABLET ORAL at 06:26

## 2022-03-12 RX ADMIN — OXYCODONE AND ACETAMINOPHEN 1 TABLET: 5; 325 TABLET ORAL at 17:42

## 2022-03-12 RX ADMIN — ATORVASTATIN CALCIUM 80 MG: 80 TABLET, FILM COATED ORAL at 20:38

## 2022-03-12 RX ADMIN — INSULIN LISPRO 2 UNITS: 100 INJECTION, SOLUTION INTRAVENOUS; SUBCUTANEOUS at 20:39

## 2022-03-12 RX ADMIN — BUTALBITAL, ACETAMINOPHEN, AND CAFFEINE 1 TABLET: 50; 325; 40 TABLET ORAL at 23:54

## 2022-03-12 RX ADMIN — SENNOSIDES 8.6 MG: 8.6 TABLET, COATED ORAL at 20:38

## 2022-03-12 RX ADMIN — CARVEDILOL 6.25 MG: 6.25 TABLET, FILM COATED ORAL at 09:33

## 2022-03-12 RX ADMIN — ENOXAPARIN SODIUM 30 MG: 30 INJECTION SUBCUTANEOUS at 09:34

## 2022-03-12 RX ADMIN — INSULIN GLARGINE 60 UNITS: 100 INJECTION, SOLUTION SUBCUTANEOUS at 09:35

## 2022-03-12 RX ADMIN — OXYCODONE AND ACETAMINOPHEN 1 TABLET: 5; 325 TABLET ORAL at 00:22

## 2022-03-12 RX ADMIN — PREGABALIN 25 MG: 25 CAPSULE ORAL at 09:33

## 2022-03-12 RX ADMIN — TORSEMIDE 50 MG: 100 TABLET ORAL at 17:42

## 2022-03-12 RX ADMIN — ENOXAPARIN SODIUM 30 MG: 30 INJECTION SUBCUTANEOUS at 20:39

## 2022-03-12 RX ADMIN — PREGABALIN 25 MG: 25 CAPSULE ORAL at 20:38

## 2022-03-12 RX ADMIN — ISOSORBIDE MONONITRATE 30 MG: 30 TABLET, EXTENDED RELEASE ORAL at 09:33

## 2022-03-12 RX ADMIN — PANTOPRAZOLE SODIUM 40 MG: 40 TABLET, DELAYED RELEASE ORAL at 09:33

## 2022-03-12 RX ADMIN — HYDRALAZINE HYDROCHLORIDE 10 MG: 10 TABLET, FILM COATED ORAL at 20:38

## 2022-03-12 RX ADMIN — OXYCODONE AND ACETAMINOPHEN 1 TABLET: 5; 325 TABLET ORAL at 23:54

## 2022-03-12 RX ADMIN — SODIUM CHLORIDE, PRESERVATIVE FREE 10 ML: 5 INJECTION INTRAVENOUS at 20:44

## 2022-03-12 RX ADMIN — FUROSEMIDE 5 MG/HR: 10 INJECTION, SOLUTION INTRAMUSCULAR; INTRAVENOUS at 05:31

## 2022-03-12 RX ADMIN — BUTALBITAL, ACETAMINOPHEN, AND CAFFEINE 1 TABLET: 50; 325; 40 TABLET ORAL at 05:27

## 2022-03-12 RX ADMIN — INSULIN LISPRO 6 UNITS: 100 INJECTION, SOLUTION INTRAVENOUS; SUBCUTANEOUS at 12:46

## 2022-03-12 RX ADMIN — INSULIN GLARGINE 60 UNITS: 100 INJECTION, SOLUTION SUBCUTANEOUS at 20:39

## 2022-03-12 RX ADMIN — INSULIN LISPRO 5 UNITS: 100 INJECTION, SOLUTION INTRAVENOUS; SUBCUTANEOUS at 12:46

## 2022-03-12 RX ADMIN — BUTALBITAL, ACETAMINOPHEN, AND CAFFEINE 1 TABLET: 50; 325; 40 TABLET ORAL at 10:54

## 2022-03-12 RX ADMIN — HYDRALAZINE HYDROCHLORIDE 10 MG: 10 TABLET, FILM COATED ORAL at 06:26

## 2022-03-12 RX ADMIN — HYDRALAZINE HYDROCHLORIDE 10 MG: 10 TABLET, FILM COATED ORAL at 13:26

## 2022-03-12 RX ADMIN — SPIRONOLACTONE 25 MG: 25 TABLET ORAL at 13:25

## 2022-03-12 ASSESSMENT — PAIN SCALES - GENERAL
PAINLEVEL_OUTOF10: 8
PAINLEVEL_OUTOF10: 0
PAINLEVEL_OUTOF10: 8
PAINLEVEL_OUTOF10: 6
PAINLEVEL_OUTOF10: 6
PAINLEVEL_OUTOF10: 2
PAINLEVEL_OUTOF10: 8
PAINLEVEL_OUTOF10: 2
PAINLEVEL_OUTOF10: 0
PAINLEVEL_OUTOF10: 0
PAINLEVEL_OUTOF10: 8
PAINLEVEL_OUTOF10: 8

## 2022-03-12 NOTE — PLAN OF CARE
Problem: OXYGENATION/RESPIRATORY FUNCTION  Goal: Patient will maintain patent airway  Outcome: Ongoing  Note:   Patient's EF (Ejection Fraction) is greater than 40%    Heart Failure Medications:  Diuretics[de-identified] Furosemide, Torsemide, and Spironolactone    (One of the following REQUIRED for EF </= 40%/SYSTOLIC FAILURE but MAY be used in EF% >40%/DIASTOLIC FAILURE)        ACE[de-identified] None        ARB[de-identified] None         ARNI[de-identified] None    (Beta Blockers)  NON- Evidenced Based Beta Blocker (for EF% >40%/DIASTOLIC FAILURE): None    Evidenced Based Beta Blocker::(REQUIRED for EF% <40%/SYSTOLIC FAILURE) Carvedilol- Coreg  . .................................................................................................................................................. Patient's weights and intake/output reviewed: Yes    Patient's Last Weight: 281 lbs obtained by standing scale. Difference of 0 lbs less than last documented weight. Intake/Output Summary (Last 24 hours) at 3/12/2022 1559  Last data filed at 3/12/2022 1325  Gross per 24 hour   Intake 600 ml   Output 2025 ml   Net -1425 ml       Comorbidities Reviewed Yes    Patient has a past medical history of Anemia, Angina, Arthritis, CAD (coronary artery disease), CHF (congestive heart failure) ([facility] [address] CKD (chronic kidney disease) stage 3, GFR 30-59 ml/min ([facility]), Clostridium difficile diarrhea, Diabetes mellitus ([facility] [address] Diabetic neuropathy ([facility] [address] Disease of blood and blood forming organ, Dizziness, GERD (gastroesophageal reflux disease), Headache, Hearing loss, Hyperlipidemia, Hypertension, Kidney stone, Refusal of blood transfusions as patient is [place], Sleep apnea, Tinnitus, Venous ulcer ([address] and Wound, open.      >>For CHF and Comorbidity documentation on Education Time and Topics, please see Education Tab    Progressive Mobility Assessment:  What is this patient's Current Level of Mobility?: Ambulatory-Up Ad Mami  How was this patient Mobilized today?: Edge of Bed,  Up to Toilet/Shower, and Up in Room, ambulated 25 ft                 With Whom? Self                 Level of Difficulty/Assistance: Independent     Pt resting in bed at this time on  3 L O2. Pt denies shortness of breath. Pt with pitting lower extremity edema. Patient and/or Family's stated Goal of Care this Admission: increase activity tolerance, be more comfortable, and reduce lower extremity edema prior to discharge        :      Problem: Pain:  Goal: Pain level will decrease  Description: Pain level will decrease  3/12/2022 1559 by Sandy Barba RN  Outcome: Ongoing  Note: Pt will be satisfied with pain control. Pt uses numeric pain rating scale with reassessments after pain med administration. Will continue to monitor progression throughout shift. Problem: Falls - Risk of:  Goal: Will remain free from falls  Description: Will remain free from falls  Outcome: Ongoing  Note: Pt refusing bed alarm. Problem: Serum Glucose Level - Abnormal:  Goal: Ability to maintain appropriate glucose levels will improve  Description: Ability to maintain appropriate glucose levels will improve  3/12/2022 1559 by Sandy Barba RN  Outcome: Ongoing  Note: Pt will have accuchecks before meals and at bedtime with sliding scale insulin in place for coverage. Will continue to monitor for signs and symptoms of hypoglycemia and hyperglycemia throughout shift.       Problem: Skin Integrity:  Goal: Will show no infection signs and symptoms  Description: Will show no infection signs and symptoms  Outcome: Ongoing

## 2022-03-12 NOTE — PROGRESS NOTES
Sent the following to Dr. Jeffrey Sanders through a secure message: The patient has a headache with 8/10 pain. He states this is chronic issue, and he takes Excedrin Migraine at home. Can he have anything for this while he is here? Will look for orders. Thank you.

## 2022-03-12 NOTE — PROGRESS NOTES
Patient is refusing the use of the bed alarm. Patient is alert and oriented and has been educated on his fall risk assessment and the need for a bed alarm for his safety. Patient stated he assumes responsibility for any accident or injury that results from this refusal.

## 2022-03-12 NOTE — PROGRESS NOTES
Hospitalist Progress Note    Date of Admission: 3/7/2022    Chief Complaint: SOB    Hospital Course:   78 y.o. male who presented to [facility] with acute dyspnea. Past medical history significant for former smoking, HTN, HLD, DM2, CAD s/p CABG in 2010, and dCHF. He has had recurrent hospitalizations for CHF, most recently 1/2022. He reports compliance with his diuretic regimen of Torsemide, Aldactone and Metolazone. He reports progressive increase in shortness of breath and dyspnea with exertion, as well as worsening LE edema. He also notes recent issues with left ear infection. He was initially started on Cipro drops and later Augmentin (3/2/22) by ENT Dr. Faith Matos. He is concerned that the ear infection has not resolved as he has continued pain   Breathing better today. Still has ear infection. ENT to see the patient again on the 14th    Subjective: Tired, improving shortness of breath, still has left ear pain.   No chest pain, no nausea or vomiting    Scheduled Meds:   torsemide  50 mg Oral BID    spironolactone  25 mg Oral Daily    pregabalin  25 mg Oral BID    insulin lispro  5 Units SubCUTAneous TID WC    atorvastatin  80 mg Oral Nightly    enoxaparin  30 mg SubCUTAneous BID    carvedilol  6.25 mg Oral BID WC    linaclotide  290 mcg Oral QAM AC    allopurinol  300 mg Oral Daily    aspirin  81 mg Oral Daily    hydrALAZINE  10 mg Oral 3 times per day    isosorbide mononitrate  30 mg Oral Daily    pantoprazole  40 mg Oral QAM AC    senna  1 tablet Oral BID    sodium chloride flush  5-40 mL IntraVENous 2 times per day    insulin glargine  60 Units SubCUTAneous BID    insulin lispro  0-18 Units SubCUTAneous TID     insulin lispro  0-9 Units SubCUTAneous Nightly     Continuous Infusions:   dextrose      sodium chloride       PRN Meds:.butalbital-acetaminophen-caffeine, oxyCODONE-acetaminophen, glucose, glucagon (rDNA), dextrose, sodium chloride flush, sodium chloride, ondansetron **OR** ondansetron, polyethylene glycol, acetaminophen **OR** acetaminophen, dextrose bolus (hypoglycemia) **OR** dextrose bolus (hypoglycemia)       Intake/Output Summary (Last 24 hours) at 3/12/2022 1347  Last data filed at 3/12/2022 1159  Gross per 24 hour   Intake 720 ml   Output 2675 ml   Net -1955 ml         Labs:   Recent Labs     03/11/22  0635   WBC 7.8   HGB 11.2*   HCT 35.0*        Recent Labs     03/10/22  0704 03/11/22  0635 03/12/22  0623    141 137   K 3.6 4.1 3.9   CL 97* 96* 95*   CO2 34* 34* 30   BUN 52* 49* 47*   CREATININE 1.2 1.3 1.3   CALCIUM 9.5 10.1 9.4     No results for input(s): AST, ALT, BILIDIR, BILITOT, ALKPHOS in the last 72 hours. No results for input(s): INR in the last 72 hours. Physical Exam Performed:    /64   Pulse 66   Temp 98.1 °F (36.7 °C) (Oral)   Resp 16   Ht 5' 9\" (1.753 m)   Wt 281 lb 14.4 oz (127.9 kg)   SpO2 92%   BMI 41.63 kg/m²   General appearance:  No apparent distress, appears stated age and cooperative. Morbidly obese. HEENT:  Pupils equal, round, and reactive to light. Conjunctivae/corneas clear. Neck:  Supple, no jugular venous distention. Trachea midline with full range of motion. Respiratory:  Normal respiratory effort. Diminished but clear to auscultation, bilaterally without Rales/Wheezes/Rhonchi. Cardiovascular:  Regular rate and rhythm with normal S1/S2 without murmurs, rubs or gallops. Abdomen:  Soft, non-tender, non-distended with normal bowel sounds. Musculoskelatal:  No clubbing, cyanosis. Improving LE edema bilaterally. Full range of motion without deformity. Generalized weakness. Neurologic:  Neurovascularly intact without any focal sensory/motor deficits. Cranial nerves: II-XII intact, grossly non-focal.  Psychiatric:  Alert and oriented, thought content appropriate, normal insight  Skin:  Skin color, texture, turgor normal.  No rashes or lesions.   Capillary Refill:  Brisk,< 3 seconds   Peripheral Pulses:  +2 palpable, equal bilaterally     Assessment/Plan:    Active Hospital Problems    Diagnosis     Acute on chronic diastolic CHF (congestive heart failure) ([facility]) [I50.33]     Morbid obesity ([facility] [address] [E66.01]     Essential hypertension [I10]     DM [facility]di[facility] mellitus[facility] [E11.9]      PLAN    Acute on chronic diastolic congestive heart failure  Continue Lasix drip, pending cardiology decision to switch to oral.  I think this will be soon. Monitor daily weights intake and output. Overall fluid balance is negative. Patient feels better. Continue to monitor. Left ear otitis externa  Being followed by ENT. Repeat debridement being planned 2 days from now    Chronic hypoxemic respiratory failure  Oxygen requirement at baseline. Continue oxygen supplementations and monitor    Chronic kidney disease stage III  Baseline is 1.2-1.5 creatinine. Today creatinine is 1.3, which is in baseline. Follow-up daily basic metabolic panel    Diabetes mellitus type 2  Continue sliding scale insulin. Blood sugars slightly above and below 200. I will not make any changes in insulin regimen for today. Discussed with the patient. Questions answered    DVT Prophylaxis: Lovenox  Diet: ADULT DIET; Regular; 4 carb choices (60 gm/meal); Low Fat/Low Chol/High Fiber/2 gm Na  Code Status: Full Code  PT/OT Eval Status: In process    Dispo - likely 2-3 more days.     Jeanne Topete MD

## 2022-03-12 NOTE — PLAN OF CARE
Problem: OXYGENATION/RESPIRATORY FUNCTION  Goal: Patient will achieve/maintain normal respiratory rate/effort  Description: Respiratory rate and effort will be within normal limits for the patient  Outcome: Ongoing     Problem: FLUID AND ELECTROLYTE IMBALANCE  Goal: Fluid and electrolyte balance are achieved/maintained  Outcome: Ongoing     Problem: ACTIVITY INTOLERANCE/IMPAIRED MOBILITY  Goal: Mobility/activity is maintained at optimum level for patient  Outcome: Ongoing     Patient's EF (Ejection Fraction) is greater than 40%    Heart Failure Medications:  Diuretics[de-identified] Furosemide    (One of the following REQUIRED for EF </= 40%/SYSTOLIC FAILURE but MAY be used in EF% >40%/DIASTOLIC FAILURE)        ACE[de-identified] None        ARB[de-identified] None         ARNI[de-identified] None    (Beta Blockers)  NON- Evidenced Based Beta Blocker (for EF% >40%/DIASTOLIC FAILURE): Metoprolol TARTrate- Lopressor    Evidenced Based Beta Blocker::(REQUIRED for EF% <40%/SYSTOLIC FAILURE) Carvedilol- Coreg  . .................................................................................................................................................. Patient's weights and intake/output reviewed: Yes    Patient's Last Weight: 281 lbs obtained by standing scale. Difference of 0 lbs less than last documented weight.       Intake/Output Summary (Last 24 hours) at 3/11/2022 1918  Last data filed at 3/11/2022 1859  Gross per 24 hour   Intake 960 ml   Output 1600 ml   Net -640 ml       Comorbidities Reviewed Yes    Patient has a past medical history of Anemia, Angina, Arthritis, CAD (coronary artery disease), CHF (congestive heart failure) ([facility] [address] CKD (chronic kidney disease) stage 3, GFR 30-59 ml/min ([facility]), Clostridium difficile diarrhea, Diabetes mellitus ([address] Diabetic neuropathy ([facility] Disease of blood and blood forming organ, Dizziness, GERD (gastroesophageal reflux disease), Headache, Hearing loss, Hyperlipidemia, Hypertension, Kidney stone, Refusal of blood transfusions as patient is Faith, Sleep apnea, Tinnitus, Venous ulcer ([address] and Wound, open. >>For CHF and Comorbidity documentation on Education Time and Topics, please see Education Tab    Progressive Mobility Assessment:  What is this patient's Current Level of Mobility?: Ambulatory- with Assistance  How was this patient Mobilized today?: Edge of Bed and Up in Room, ambulated 10 ft                 With Whom? Nurse, PCA, PT, and OT                 Level of Difficulty/Assistance: 1x Assist     Pt sitting in bed at this time on  4 L O2. Pt denies shortness of breath. Pt with nonpitting lower extremity edema.      Patient and/or Family's stated Goal of Care this Admission: reduce shortness of breath, increase activity tolerance, better understand heart failure and disease management, be more comfortable, and reduce lower extremity edema prior to discharge        :

## 2022-03-12 NOTE — PLAN OF CARE
Problem: FLUID AND ELECTROLYTE IMBALANCE  Goal: Fluid and electrolyte balance are achieved/maintained  3/12/2022 0441 by Sepideh Amezquita RN  Outcome: Met This Shift     Problem: Pain:  Goal: Pain level will decrease  Description: Pain level will decrease  Outcome: Ongoing     Problem: OXYGENATION/RESPIRATORY FUNCTION  Goal: Patient will achieve/maintain normal respiratory rate/effort  Description: Respiratory rate and effort will be within normal limits for the patient  3/12/2022 0441 by Sepideh Amezquita RN  Outcome: Ongoing     Problem: HEMODYNAMIC STATUS  Goal: Patient has stable vital signs and fluid balance  3/12/2022 0441 by Sepideh Amezquita RN  Outcome: Ongoing     Problem: Serum Glucose Level - Abnormal:  Goal: Ability to maintain appropriate glucose levels will improve  Description: Ability to maintain appropriate glucose levels will improve  Outcome: Ongoing      Patient's EF (Ejection Fraction) is greater than 40%    Heart Failure Medications:  Diuretics[de-identified] Furosemide    (One of the following REQUIRED for EF </= 40%/SYSTOLIC FAILURE but MAY be used in EF% >40%/DIASTOLIC FAILURE)        ACE[de-identified] None        ARB[de-identified] None         ARNI[de-identified] None    (Beta Blockers)  NON- Evidenced Based Beta Blocker (for EF% >40%/DIASTOLIC FAILURE): None    Evidenced Based Beta Blocker::(REQUIRED for EF% <40%/SYSTOLIC FAILURE) Carvedilol- Coreg  . .................................................................................................................................................. Patient's weights and intake/output reviewed: Yes    Patient's Last Weight: 281 lbs obtained by standing scale. Difference of 0 lbs more than last documented weight.       Intake/Output Summary (Last 24 hours) at 3/12/2022 0445  Last data filed at 3/12/2022 9920  Gross per 24 hour   Intake 720 ml   Output 2050 ml   Net -1330 ml       Comorbidities Reviewed Yes    Patient has a past medical history of Anemia, Angina, Arthritis, CAD (coronary artery disease), CHF (congestive heart failure) ([facility] [address] CKD (chronic kidney disease) stage 3, GFR 30-59 ml/min ([facility]), Clostridium difficile diarrhea, Diabetes mellitus ([facility] [address] Diabetic neuropathy ([facility] [address] Disease of blood and blood forming organ, Dizziness, GERD (gastroesophageal reflux disease), Headache, Hearing loss, Hyperlipidemia, Hypertension, Kidney stone, Refusal of blood transfusions as patient is Episcopal, Sleep apnea, Tinnitus, Venous ulcer ([facility] [address] and Wound, open. >>For CHF and Comorbidity documentation on Education Time and Topics, please see Education Tab    Progressive Mobility Assessment:  What is this patient's Current Level of Mobility?: Ambulatory-Up Ad Mami  How was this patient Mobilized today?:  Up to Toilet/Shower and Up in Room, ambulated 15   ft                 With Whom? Nurse, PCA, and Self                 Level of Difficulty/Assistance:  Patient refusing assistance. Pt resting in bed at this time on  2 L O2. Pt with complaints of shortness of breath. Pt with pitting lower extremity edema. Patient and/or Family's stated Goal of Care this Admission: reduce shortness of breath, increase activity tolerance, better understand heart failure and disease management, be more comfortable, and reduce lower extremity edema prior to discharge    Diabetes education provided today:    Carbs: good carbs and bad carbs, importance of carb counting, incorporation of protein with each meal to reduce Glycemic index, importance of portions, Carb/insulin ratio.    Lucien Potts

## 2022-03-12 NOTE — PROGRESS NOTES
Wasted 1 percocet d/t it dropping on the floor in pt's room. Pulled 1 more and administered to pt.     Electronically signed by Peyman Hong RN on 3/12/2022 at 3:19 AM     Electronically signed by Paul Valencia RN on 3/12/22 at 3:20 AM EST

## 2022-03-12 NOTE — PROGRESS NOTES
[address]   Daily Progress Note    Admit Date:  3/7/2022  HPI:    Chief Complaint   Patient presents with    Shortness of Breath     patient with dyspnea on exertion since last evening. Patient denies pain, home O2 level of 3.5lnc. Interval history: Avtar Silva is being followed for shortness of breath. Treated for CHF. Currently on Lasix infusion. Completed stress testing. Subjective:  Mr. Suma Berry breathing is stable. Edema improved. Complains of the ear pain. - being seen by ENT.      Objective:   /75   Pulse 63   Temp 97.9 °F (36.6 °C) (Oral)   Resp 20   Ht 5' 9\" (1.753 m)   Wt 281 lb 14.4 oz (127.9 kg)   SpO2 94%   BMI 41.63 kg/m²       Intake/Output Summary (Last 24 hours) at 3/12/2022 1052  Last data filed at 3/12/2022 0436  Gross per 24 hour   Intake 960 ml   Output 2325 ml   Net -1365 ml       NYHA: III    Physical Exam:  General:  Awake, alert, NAD, obese  Skin:  Warm and dry  Neck:  JVD difficult to assess due to body habitus  Chest:  Clear to auscultation, no wheezes/rhonchi/rales  Cardiovascular:  RRR S1S2, + murmur no r/g   Abdomen:  Soft, nontender, +bowel sounds  Extremities:  + less  bilateral lower extremity edema    Medications:    pregabalin  25 mg Oral BID    insulin lispro  5 Units SubCUTAneous TID WC    atorvastatin  80 mg Oral Nightly    enoxaparin  30 mg SubCUTAneous BID    carvedilol  6.25 mg Oral BID WC    linaclotide  290 mcg Oral QAM AC    allopurinol  300 mg Oral Daily    aspirin  81 mg Oral Daily    hydrALAZINE  10 mg Oral 3 times per day    isosorbide mononitrate  30 mg Oral Daily    pantoprazole  40 mg Oral QAM AC    senna  1 tablet Oral BID    sodium chloride flush  5-40 mL IntraVENous 2 times per day    insulin glargine  60 Units SubCUTAneous BID    insulin lispro  0-18 Units SubCUTAneous TID     insulin lispro  0-9 Units SubCUTAneous Nightly      furosemide (LASIX) 1mg/ml infusion 5 mg/hr (03/12/22 0531)   [facility] dextrose      sodium chloride         Lab Data:  CBC:   Recent Labs     03/11/22  0635   WBC 7.8   HGB 11.2*        BMP:    Recent Labs     03/10/22  0704 03/11/22  0635 03/12/22  0623    141 137   K 3.6 4.1 3.9   CO2 34* 34* 30   BUN 52* 49* 47*   CREATININE 1.2 1.3 1.3     INR:  No results for input(s): INR in the last 72 hours. BNP:    Recent Labs     03/10/22  0704   PROBNP 1,280*     Lab Results   Component Value Date    LVEF 58 08/25/2021       Testing:  Echo 1/2022  Limited only f/u for LVEF and aortic valve stenosis. Technically difficult examination. Low normal left ventricular systolic function with ejection fraction of 50%. Moderate aortic stenosis. Aortic stenosis values appear approximately same when compared to echo on 8-. Mild to moderate aortic regurgitation. Stress test 3/10/22      Summary    Abnormal moderate risk myocardial perfusion study.   Iliana Glimpse is a large area of mixed ischemia and scar within the infero-apical,    apical-lateral, lateral, posterior-lateral, and posterior-basal segments.    The left ventricular size is moderately dilated.    The estimated left ventricular function is 67%.      03/12/22 0500 281 lb 14.4 oz (127.9 kg) Actual;Standing scale     03/11/22 03:41:03 281 lb 14.4 oz (127.9 kg) Standing scale     03/10/22 0507 281 lb 4.8 oz (127.6 kg) Actual;Standing scale     03/09/22 0350 286 lb 9.6 oz (130 kg) Standing scale     03/08/22 03:44:58 291 lb (132 kg) Actual;Standing scale     03/07/22 2008 293 lb 6.4 oz (133.1 kg) Standing scale     03/07/22 1342 283 lb (128.4 kg) Stated       Principal Problem:    Acute on chronic diastolic CHF (congestive heart failure) ([facility])  Active Problems:    DM (diabetes mellitus) ([facility] [address]    Essential hypertension    Morbid obesity ([facility]  Resolved Problems:    * No resolved hospital problems.  *      Assessment:  Acute on chronic diastolic heart failure  Elevated troponin  HTN  MOderate aortic stenosis  CAD s/p CABG 2010, s/p [facility] 2012 patent grafts  DM  MARGARITO on CKD  Obese  HENNA  Anemia      Plan:  Continue aspirin  Lipitor to 80mg daily   Continue coreg 6.25mg BID  Hydralazine and nitrates  Stop lasix infusion today and change to torsemide 50mg BID starting this evening  Restart Spironolactone (aldactone)   will decide on the metolazone tomorrow.      Discharge planning     Lucio Blanc, PATRICK - CNP,  3/12/2022, 12:53 PM

## 2022-03-13 LAB
GLUCOSE BLD-MCNC: 153 MG/DL (ref 70–99)
GLUCOSE BLD-MCNC: 160 MG/DL (ref 70–99)
GLUCOSE BLD-MCNC: 200 MG/DL (ref 70–99)
GLUCOSE BLD-MCNC: 213 MG/DL (ref 70–99)
PERFORMED ON: ABNORMAL
PRO-BNP: 857 PG/ML (ref 0–124)

## 2022-03-13 PROCEDURE — 6370000000 HC RX 637 (ALT 250 FOR IP): Performed by: NURSE PRACTITIONER

## 2022-03-13 PROCEDURE — 2580000003 HC RX 258: Performed by: INTERNAL MEDICINE

## 2022-03-13 PROCEDURE — 6370000000 HC RX 637 (ALT 250 FOR IP): Performed by: INTERNAL MEDICINE

## 2022-03-13 PROCEDURE — 83880 ASSAY OF NATRIURETIC PEPTIDE: CPT

## 2022-03-13 PROCEDURE — 99232 SBSQ HOSP IP/OBS MODERATE 35: CPT | Performed by: NURSE PRACTITIONER

## 2022-03-13 PROCEDURE — 6360000002 HC RX W HCPCS: Performed by: INTERNAL MEDICINE

## 2022-03-13 PROCEDURE — 6370000000 HC RX 637 (ALT 250 FOR IP): Performed by: FAMILY MEDICINE

## 2022-03-13 PROCEDURE — 94761 N-INVAS EAR/PLS OXIMETRY MLT: CPT

## 2022-03-13 PROCEDURE — 36415 COLL VENOUS BLD VENIPUNCTURE: CPT

## 2022-03-13 PROCEDURE — 2700000000 HC OXYGEN THERAPY PER DAY

## 2022-03-13 PROCEDURE — 1200000000 HC SEMI PRIVATE

## 2022-03-13 RX ADMIN — INSULIN LISPRO 6 UNITS: 100 INJECTION, SOLUTION INTRAVENOUS; SUBCUTANEOUS at 08:58

## 2022-03-13 RX ADMIN — ENOXAPARIN SODIUM 30 MG: 30 INJECTION SUBCUTANEOUS at 08:57

## 2022-03-13 RX ADMIN — PREGABALIN 25 MG: 25 CAPSULE ORAL at 22:19

## 2022-03-13 RX ADMIN — SODIUM CHLORIDE, PRESERVATIVE FREE 10 ML: 5 INJECTION INTRAVENOUS at 22:27

## 2022-03-13 RX ADMIN — SODIUM CHLORIDE, PRESERVATIVE FREE 10 ML: 5 INJECTION INTRAVENOUS at 08:57

## 2022-03-13 RX ADMIN — INSULIN LISPRO 3 UNITS: 100 INJECTION, SOLUTION INTRAVENOUS; SUBCUTANEOUS at 22:21

## 2022-03-13 RX ADMIN — SPIRONOLACTONE 25 MG: 25 TABLET ORAL at 08:56

## 2022-03-13 RX ADMIN — BUTALBITAL, ACETAMINOPHEN, AND CAFFEINE 1 TABLET: 50; 325; 40 TABLET ORAL at 22:40

## 2022-03-13 RX ADMIN — HYDRALAZINE HYDROCHLORIDE 10 MG: 10 TABLET, FILM COATED ORAL at 06:31

## 2022-03-13 RX ADMIN — POLYETHYLENE GLYCOL 3350 17 G: 17 POWDER, FOR SOLUTION ORAL at 22:35

## 2022-03-13 RX ADMIN — OXYCODONE AND ACETAMINOPHEN 1 TABLET: 5; 325 TABLET ORAL at 22:37

## 2022-03-13 RX ADMIN — ALLOPURINOL 300 MG: 300 TABLET ORAL at 08:57

## 2022-03-13 RX ADMIN — PANTOPRAZOLE SODIUM 40 MG: 40 TABLET, DELAYED RELEASE ORAL at 08:57

## 2022-03-13 RX ADMIN — PREGABALIN 25 MG: 25 CAPSULE ORAL at 08:56

## 2022-03-13 RX ADMIN — HYDRALAZINE HYDROCHLORIDE 10 MG: 10 TABLET, FILM COATED ORAL at 22:19

## 2022-03-13 RX ADMIN — SENNOSIDES 8.6 MG: 8.6 TABLET, COATED ORAL at 22:19

## 2022-03-13 RX ADMIN — HYDRALAZINE HYDROCHLORIDE 10 MG: 10 TABLET, FILM COATED ORAL at 15:18

## 2022-03-13 RX ADMIN — INSULIN GLARGINE 60 UNITS: 100 INJECTION, SOLUTION SUBCUTANEOUS at 22:23

## 2022-03-13 RX ADMIN — INSULIN LISPRO 5 UNITS: 100 INJECTION, SOLUTION INTRAVENOUS; SUBCUTANEOUS at 12:43

## 2022-03-13 RX ADMIN — INSULIN GLARGINE 60 UNITS: 100 INJECTION, SOLUTION SUBCUTANEOUS at 08:58

## 2022-03-13 RX ADMIN — INSULIN LISPRO 3 UNITS: 100 INJECTION, SOLUTION INTRAVENOUS; SUBCUTANEOUS at 18:01

## 2022-03-13 RX ADMIN — TORSEMIDE 50 MG: 100 TABLET ORAL at 18:00

## 2022-03-13 RX ADMIN — ATORVASTATIN CALCIUM 80 MG: 80 TABLET, FILM COATED ORAL at 22:19

## 2022-03-13 RX ADMIN — SENNOSIDES 8.6 MG: 8.6 TABLET, COATED ORAL at 08:56

## 2022-03-13 RX ADMIN — INSULIN LISPRO 5 UNITS: 100 INJECTION, SOLUTION INTRAVENOUS; SUBCUTANEOUS at 08:58

## 2022-03-13 RX ADMIN — INSULIN LISPRO 3 UNITS: 100 INJECTION, SOLUTION INTRAVENOUS; SUBCUTANEOUS at 12:43

## 2022-03-13 RX ADMIN — ASPIRIN 81 MG 81 MG: 81 TABLET ORAL at 08:57

## 2022-03-13 RX ADMIN — CARVEDILOL 6.25 MG: 6.25 TABLET, FILM COATED ORAL at 08:57

## 2022-03-13 RX ADMIN — TORSEMIDE 50 MG: 100 TABLET ORAL at 08:57

## 2022-03-13 RX ADMIN — ENOXAPARIN SODIUM 30 MG: 30 INJECTION SUBCUTANEOUS at 22:19

## 2022-03-13 RX ADMIN — ISOSORBIDE MONONITRATE 30 MG: 30 TABLET, EXTENDED RELEASE ORAL at 08:56

## 2022-03-13 RX ADMIN — INSULIN LISPRO 5 UNITS: 100 INJECTION, SOLUTION INTRAVENOUS; SUBCUTANEOUS at 18:01

## 2022-03-13 ASSESSMENT — PAIN DESCRIPTION - DESCRIPTORS: DESCRIPTORS: ACHING;CONSTANT

## 2022-03-13 ASSESSMENT — PAIN SCALES - GENERAL
PAINLEVEL_OUTOF10: 0
PAINLEVEL_OUTOF10: 7
PAINLEVEL_OUTOF10: 0

## 2022-03-13 ASSESSMENT — PAIN - FUNCTIONAL ASSESSMENT: PAIN_FUNCTIONAL_ASSESSMENT: PREVENTS OR INTERFERES SOME ACTIVE ACTIVITIES AND ADLS

## 2022-03-13 ASSESSMENT — PAIN DESCRIPTION - PROGRESSION: CLINICAL_PROGRESSION: GRADUALLY WORSENING

## 2022-03-13 ASSESSMENT — PAIN DESCRIPTION - LOCATION: LOCATION: GENERALIZED;EAR

## 2022-03-13 ASSESSMENT — PAIN DESCRIPTION - FREQUENCY: FREQUENCY: CONTINUOUS

## 2022-03-13 ASSESSMENT — PAIN DESCRIPTION - PAIN TYPE: TYPE: ACUTE PAIN;CHRONIC PAIN

## 2022-03-13 ASSESSMENT — PAIN DESCRIPTION - ONSET: ONSET: ON-GOING

## 2022-03-13 NOTE — PLAN OF CARE
Problem: FLUID AND ELECTROLYTE IMBALANCE  Goal: Fluid and electrolyte balance are achieved/maintained  Outcome: Ongoing     Problem: OXYGENATION/RESPIRATORY FUNCTION  Goal: Patient will achieve/maintain normal respiratory rate/effort  Description: Respiratory rate and effort will be within normal limits for the patient  Outcome: Ongoing     Problem: Serum Glucose Level - Abnormal:  Goal: Ability to maintain appropriate glucose levels will improve  Description: Ability to maintain appropriate glucose levels will improve      Patient's EF (Ejection Fraction) is greater than 40%    Heart Failure Medications:  Diuretics[de-identified] Torsemide and Spironolactone    (One of the following REQUIRED for EF </= 40%/SYSTOLIC FAILURE but MAY be used in EF% >40%/DIASTOLIC FAILURE)        ACE[de-identified] None        ARB[de-identified] None         ARNI[de-identified] None    (Beta Blockers)  NON- Evidenced Based Beta Blocker (for EF% >40%/DIASTOLIC FAILURE): None    Evidenced Based Beta Blocker::(REQUIRED for EF% <40%/SYSTOLIC FAILURE) Carvedilol- Coreg  . .................................................................................................................................................. Patient's weights and intake/output reviewed: Yes    Patient's Last Weight: 281 lbs obtained by standing scale. Difference of 0 lbs more than last documented weight.       Intake/Output Summary (Last 24 hours) at 3/13/2022 0438  Last data filed at 3/12/2022 1849  Gross per 24 hour   Intake 480 ml   Output 1225 ml   Net -745 ml       Comorbidities Reviewed Yes    Patient has a past medical history of Anemia, Angina, Arthritis, CAD (coronary artery disease), [facility]congestive [facility]) [facility] CKD (chronic kidney disease) stage 3, GFR 30-59 ml/min ([facility]), Clostridium difficile diarrhea, Diabetes mellitus ([facility] Diabetic neuropathy ([facility] Disease of blood and blood forming organ, Dizziness, GERD (gastroesophageal reflux disease), Headache, Hearing loss, Hyperlipidemia, Hypertension, Kidney stone, Refusal of blood transfusions as patient is Catholic, Sleep apnea, Tinnitus, Venous ulcer ([address] and Wound, open. >>For CHF and Comorbidity documentation on Education Time and Topics, please see Education Tab    Progressive Mobility Assessment:  What is this patient's Current Level of Mobility?: Ambulatory-Up Ad Mami  How was this patient Mobilized today?: Edge of Bed, Up to Chair, Bedside Commode,  Up to Toilet/Shower, Up in Room, Up in [place], Unable to Mobilize, and Patient Refuses to Mobilize, ambulated 115 ft                 With Whom? Nurse, PCA, PT, OT, and Self                 Level of Difficulty/Assistance: Independent     Pt resting in bed at this time on  2 L O2. Pt with complaints of shortness of breath. Pt with pitting lower extremity edema.      Patient and/or Family's stated Goal of Care this Admission: reduce shortness of breath, increase activity tolerance, better understand heart failure and disease management, be more comfortable, and reduce lower extremity edema prior to discharge        :

## 2022-03-13 NOTE — PROGRESS NOTES
Hospitalist Progress Note    Date of Admission: 3/7/2022    Chief Complaint: SOB    Hospital Course:   78 y.o. male who presented to [facility] with acute dyspnea. Past medical history significant for former smoking, HTN, HLD, DM2, CAD s/p CABG in 2010, and dCHF. He has had recurrent hospitalizations for CHF, most recently 1/2022. He reports compliance with his diuretic regimen of Torsemide, Aldactone and Metolazone. He reports progressive increase in shortness of breath and dyspnea with exertion, as well as worsening LE edema. He also notes recent issues with left ear infection. He was initially started on Cipro drops and later Augmentin (3/2/22) by ENT Dr. Nikia Hall. He is concerned that the ear infection has not resolved as he has continued pain   Still has ear infection. ENT to see the patient again on the 14th  Breathing better. Diuretics switched to oral by cardiology. Subjective: Subjectively stronger, improving shortness of breath, still has left ear pain.   No chest pain, no nausea or vomiting    Scheduled Meds:   torsemide  50 mg Oral BID    spironolactone  25 mg Oral Daily    pregabalin  25 mg Oral BID    insulin lispro  5 Units SubCUTAneous TID     atorvastatin  80 mg Oral Nightly    enoxaparin  30 mg SubCUTAneous BID    carvedilol  6.25 mg Oral BID WC    linaclotide  290 mcg Oral QAM AC    allopurinol  300 mg Oral Daily    aspirin  81 mg Oral Daily    hydrALAZINE  10 mg Oral 3 times per day    isosorbide mononitrate  30 mg Oral Daily    pantoprazole  40 mg Oral QAM AC    senna  1 tablet Oral BID    sodium chloride flush  5-40 mL IntraVENous 2 times per day    insulin glargine  60 Units SubCUTAneous BID    insulin lispro  0-18 Units SubCUTAneous TID     insulin lispro  0-9 Units SubCUTAneous Nightly     Continuous Infusions:   dextrose      sodium chloride       PRN Meds:.butalbital-acetaminophen-caffeine, oxyCODONE-acetaminophen, glucose, glucagon (rDNA), dextrose, sodium chloride flush, sodium chloride, ondansetron **OR** ondansetron, polyethylene glycol, acetaminophen **OR** acetaminophen, dextrose bolus (hypoglycemia) **OR** dextrose bolus (hypoglycemia)       Intake/Output Summary (Last 24 hours) at 3/13/2022 1210  Last data filed at 3/13/2022 0853  Gross per 24 hour   Intake 240 ml   Output 0 ml   Net 240 ml         Labs:   Recent Labs     03/11/22  0635   WBC 7.8   HGB 11.2*   HCT 35.0*        Recent Labs     03/11/22  0635 03/12/22  0623    137   K 4.1 3.9   CL 96* 95*   CO2 34* 30   BUN 49* 47*   CREATININE 1.3 1.3   CALCIUM 10.1 9.4     No results for input(s): AST, ALT, BILIDIR, BILITOT, ALKPHOS in the last 72 hours. No results for input(s): INR in the last 72 hours. Physical Exam Performed:    /69   Pulse 77   Temp 98.1 °F (36.7 °C) (Oral)   Resp 20   Ht 5' 9\" (1.753 m)   Wt 283 lb 1.6 oz (128.4 kg)   SpO2 96%   BMI 41.81 kg/m²   General appearance:  No apparent distress, appears stated age and cooperative. Morbidly obese. HEENT:  Pupils equal, round, and reactive to light. Conjunctivae/corneas clear. Neck:  Supple, no jugular venous distention. Trachea midline with full range of motion. Respiratory:  Normal respiratory effort. Diminished but clear to auscultation, bilaterally without Rales/Wheezes/Rhonchi. Cardiovascular:  Regular rate and rhythm with normal S1/S2 without murmurs, rubs or gallops. Abdomen:  Soft, non-tender, non-distended with normal bowel sounds. Musculoskelatal:  No clubbing, cyanosis. 1+ pitting LE edema bilaterally. Full range of motion without deformity. Generalized weakness. Neurologic:  Neurovascularly intact without any focal sensory/motor deficits. Cranial nerves: II-XII intact, grossly non-focal.  Psychiatric:  Alert and oriented, thought content appropriate, normal insight  Skin:  Skin color, texture, turgor normal.  No rashes or lesions.   Capillary Refill:  Brisk,< 3 seconds   Peripheral Pulses: +2 palpable, equal bilaterally     I examined the patient today (03/13/22). Physical exam is similar to yesterday (3/12). Assessment/Plan:    Active Hospital Problems    Diagnosis     Acute on chronic diastolic CHF (congestive heart failure) ([facility]) [I50.33]     Morbid obesity ([facility] [E66.01]     Essential hypertension [I10]     DM (diabetes mellitus) ([facility] [E11.9]      PLAN    Acute on chronic diastolic congestive heart failure  Diuretics switched to oral by cardiology  Continue to monitor daily weights, intake and output  Fluid balance measurement appears inaccurate. Patient feels better. Continue to monitor. Left ear otitis externa  Being followed by ENT. Repeat debridement being planned on March 14    Chronic hypoxemic respiratory failure  Oxygen requirement at baseline. Continue oxygen supplementation and monitor  Overall stable    Chronic kidney disease stage III  Baseline is 1.2-1.5 creatinine. Last creatinine is 1.3, which is in baseline. Follow-up daily basic metabolic panel. Will be rechecked tomorrow    Diabetes mellitus type 2  Continue sliding scale insulin. Blood sugars mostly below 200. I will continue to follow with no change in insulin dose. DVT Prophylaxis: Lovenox  Diet: ADULT DIET; Regular; 4 carb choices (60 gm/meal)  Code Status: Full Code  PT/OT Eval Status: In process    Dispo - likely 2-3 more days.     Stan Valencia MD

## 2022-03-13 NOTE — PLAN OF CARE
Problem: OXYGENATION/RESPIRATORY FUNCTION  Goal: Patient will maintain patent airway  Outcome: Ongoing  Note:   Patient's EF (Ejection Fraction) is greater than 40%    Heart Failure Medications:  Diuretics[de-identified] Torsemide and Spironolactone    (One of the following REQUIRED for EF </= 40%/SYSTOLIC FAILURE but MAY be used in EF% >40%/DIASTOLIC FAILURE)        ACE[de-identified] None        ARB[de-identified] None         ARNI[de-identified] None    (Beta Blockers)  NON- Evidenced Based Beta Blocker (for EF% >40%/DIASTOLIC FAILURE): None    Evidenced Based Beta Blocker::(REQUIRED for EF% <40%/SYSTOLIC FAILURE) Carvedilol- Coreg  . .................................................................................................................................................. Patient's weights and intake/output reviewed: Yes    Patient's Last Weight: 283 lbs obtained by standing scale. Difference of 2 lbs more than last documented weight. Intake/Output Summary (Last 24 hours) at 3/13/2022 1638  Last data filed at 3/13/2022 0853  Gross per 24 hour   Intake 120 ml   Output 0 ml   Net 120 ml       Comorbidities Reviewed Yes    Patient has a past medical history of Anemia, Angina, Arthritis, CAD (coronary artery disease), CHF (congestive heart failure) ([facility] [address] CKD (chronic kidney disease) stage 3, GFR 30-59 ml/min ([facility]), Clostridium difficile diarrhea, Diabetes mellitus ([facility] [address] Diabetic neuropathy ([facility] [address] Disease of blood and blood forming organ, Dizziness, GERD (gastroesophageal reflux disease), Headache, Hearing loss, Hyperlipidemia, Hypertension, Kidney stone, Refusal of blood transfusions as patient is Sabianism, Sleep apnea, Tinnitus, Venous ulcer ([facility] [address] and Wound, open.      >>For CHF and Comorbidity documentation on Education Time and Topics, please see Education Tab    Progressive Mobility Assessment:  What is this patient's Current Level of Mobility?: Ambulatory-Up Ad Mami  How was this patient Mobilized today?: Edge of Bed, Up to Chair,  Up to

## 2022-03-13 NOTE — PROGRESS NOTES
[address]   Daily Progress Note    Admit Date:  3/7/2022  HPI:    Chief Complaint   Patient presents with    Shortness of Breath     patient with dyspnea on exertion since last evening. Patient denies pain, home O2 level of 3.5lnc. Interval history: Jodi Conte is being followed for shortness of breath. Treated for CHF. Currently on Lasix infusion. Completed stress testing. Subjective:  Mr. Marivel Estrada tells me he was very hungry yesterday. Ate dinner, then turkey sandwich followed by a TV dinner.    Weight is up 2lb today  Concerned about ear pain and not being able to get the ear drops     Objective:   /62   Pulse 62   Temp 97.5 °F (36.4 °C) (Oral)   Resp 18   Ht 5' 9\" (1.753 m)   Wt 283 lb 1.6 oz (128.4 kg)   SpO2 98%   BMI 41.81 kg/m²       Intake/Output Summary (Last 24 hours) at 3/13/2022 8332  Last data filed at 3/12/2022 1849  Gross per 24 hour   Intake 480 ml   Output 1225 ml   Net -745 ml       NYHA: III    Physical Exam:  General:  Awake, alert, NAD, obese  Skin:  Warm and dry  Neck:  JVD difficult to assess due to body habitus  Chest:  Clear to auscultation, no wheezes/rhonchi/rales  Cardiovascular:  RRR S1S2, + murmur no r/g   Abdomen:  Soft, nontender, +bowel sounds  Extremities:  + less  bilateral lower extremity edema    Medications:    torsemide  50 mg Oral BID    spironolactone  25 mg Oral Daily    pregabalin  25 mg Oral BID    insulin lispro  5 Units SubCUTAneous TID WC    atorvastatin  80 mg Oral Nightly    enoxaparin  30 mg SubCUTAneous BID    carvedilol  6.25 mg Oral BID WC    linaclotide  290 mcg Oral QAM AC    allopurinol  300 mg Oral Daily    aspirin  81 mg Oral Daily    hydrALAZINE  10 mg Oral 3 times per day    isosorbide mononitrate  30 mg Oral Daily    pantoprazole  40 mg Oral QAM AC    senna  1 tablet Oral BID    sodium chloride flush  5-40 mL IntraVENous 2 times per day    insulin glargine  60 Units SubCUTAneous BID    insulin lispro  0-18 Units SubCUTAneous TID WC    insulin lispro  0-9 Units SubCUTAneous Nightly      dextrose      sodium chloride         Lab Data:  CBC:   Recent Labs     03/11/22  0635   WBC 7.8   HGB 11.2*        BMP:    Recent Labs     03/10/22  0704 03/11/22  0635 03/12/22  0623    141 137   K 3.6 4.1 3.9   CO2 34* 34* 30   BUN 52* 49* 47*   CREATININE 1.2 1.3 1.3     INR:  No results for input(s): INR in the last 72 hours. BNP:    Recent Labs     03/10/22  0704   PROBNP 1,280*     Lab Results   Component Value Date    LVEF 58 08/25/2021       Testing:  Echo 1/2022  Limited only f/u for LVEF and aortic valve stenosis. Technically difficult examination. Low normal left ventricular systolic function with ejection fraction of 50%. Moderate aortic stenosis. Aortic stenosis values appear approximately same when compared to echo on 8-. Mild to moderate aortic regurgitation. Stress test 3/10/22      Summary    Abnormal moderate risk myocardial perfusion study.   Deepika Plate is a large area of mixed ischemia and scar within the infero-apical,    apical-lateral, lateral, posterior-lateral, and posterior-basal segments.    The left ventricular size is moderately dilated.    The estimated left ventricular function is 67%.      03/12/22 0500 281 lb 14.4 oz (127.9 kg) Actual;Standing scale     03/11/22 03:41:03 281 lb 14.4 oz (127.9 kg) Standing scale     03/10/22 0507 281 lb 4.8 oz (127.6 kg) Actual;Standing scale     03/09/22 0350 286 lb 9.6 oz (130 kg) Standing scale     03/08/22 03:44:58 291 lb (132 kg) Actual;Standing scale     03/07/22 2008 293 lb 6.4 oz (133.1 kg) Standing scale     03/07/22 1342 283 lb (128.4 kg) Stated       Principal Problem:    Acute on chronic diastolic CHF (congestive heart failure) (HCC)  Active Problems:    DM (diabetes mellitus) ([facility] [address]    Essential hypertension    Morbid obesity ([facility]  Resolved Problems:    * No resolved hospital problems. *      Assessment:  Acute on chronic diastolic heart failure  Elevated troponin  HTN  MOderate aortic stenosis  CAD s/p CABG 2010, s/p LHC 2012 patent grafts  DM  MARGARITO on CKD  Obese  HENNA  Anemia      Plan:  Continue aspirin  Lipitor to 80mg daily   Continue coreg 6.25mg BID  Hydralazine and nitrates  Will continue torsemide 50mg BID for now; weight is up due to eating more food last night  Spironolactone (aldactone)    Dispo; likely tomorrow from cardiology perspective     Mariela Narvaez, PATRICK - CNP,  3/13/2022, 12:16 PM

## 2022-03-13 NOTE — PROGRESS NOTES
Educated pt on importance of measuring urine output while on diuretic medication. Pt states he prefers not to.

## 2022-03-14 VITALS
RESPIRATION RATE: 16 BRPM | DIASTOLIC BLOOD PRESSURE: 58 MMHG | HEART RATE: 86 BPM | HEIGHT: 69 IN | TEMPERATURE: 97.9 F | WEIGHT: 282.6 LBS | BODY MASS INDEX: 41.86 KG/M2 | OXYGEN SATURATION: 91 % | SYSTOLIC BLOOD PRESSURE: 123 MMHG

## 2022-03-14 DIAGNOSIS — B36.9 FUNGAL OTITIS EXTERNA: Primary | ICD-10-CM

## 2022-03-14 DIAGNOSIS — H62.40 FUNGAL OTITIS EXTERNA: Primary | ICD-10-CM

## 2022-03-14 LAB
A/G RATIO: 1.3 (ref 1.1–2.2)
ALBUMIN SERPL-MCNC: 3.7 G/DL (ref 3.4–5)
ALP BLD-CCNC: 95 U/L (ref 40–129)
ALT SERPL-CCNC: 8 U/L (ref 10–40)
ANION GAP SERPL CALCULATED.3IONS-SCNC: 11 MMOL/L (ref 3–16)
AST SERPL-CCNC: 12 U/L (ref 15–37)
BILIRUB SERPL-MCNC: 0.4 MG/DL (ref 0–1)
BUN BLDV-MCNC: 49 MG/DL (ref 7–20)
CALCIUM SERPL-MCNC: 8.9 MG/DL (ref 8.3–10.6)
CHLORIDE BLD-SCNC: 98 MMOL/L (ref 99–110)
CO2: 32 MMOL/L (ref 21–32)
CREAT SERPL-MCNC: 1.5 MG/DL (ref 0.8–1.3)
GFR AFRICAN AMERICAN: 56
GFR NON-AFRICAN AMERICAN: 47
GLUCOSE BLD-MCNC: 208 MG/DL (ref 70–99)
GLUCOSE BLD-MCNC: 221 MG/DL (ref 70–99)
GLUCOSE BLD-MCNC: 221 MG/DL (ref 70–99)
HCT VFR BLD CALC: 31.6 % (ref 40.5–52.5)
HEMOGLOBIN: 10.3 G/DL (ref 13.5–17.5)
MAGNESIUM: 1.6 MG/DL (ref 1.8–2.4)
MCH RBC QN AUTO: 28.1 PG (ref 26–34)
MCHC RBC AUTO-ENTMCNC: 32.7 G/DL (ref 31–36)
MCV RBC AUTO: 86 FL (ref 80–100)
PDW BLD-RTO: 17.1 % (ref 12.4–15.4)
PERFORMED ON: ABNORMAL
PERFORMED ON: ABNORMAL
PLATELET # BLD: 214 K/UL (ref 135–450)
PMV BLD AUTO: 7.7 FL (ref 5–10.5)
POTASSIUM SERPL-SCNC: 3.8 MMOL/L (ref 3.5–5.1)
RBC # BLD: 3.67 M/UL (ref 4.2–5.9)
SODIUM BLD-SCNC: 141 MMOL/L (ref 136–145)
TOTAL PROTEIN: 6.5 G/DL (ref 6.4–8.2)
WBC # BLD: 6.4 K/UL (ref 4–11)

## 2022-03-14 PROCEDURE — 97530 THERAPEUTIC ACTIVITIES: CPT

## 2022-03-14 PROCEDURE — 2580000003 HC RX 258: Performed by: INTERNAL MEDICINE

## 2022-03-14 PROCEDURE — 94761 N-INVAS EAR/PLS OXIMETRY MLT: CPT

## 2022-03-14 PROCEDURE — 99232 SBSQ HOSP IP/OBS MODERATE 35: CPT | Performed by: OTOLARYNGOLOGY

## 2022-03-14 PROCEDURE — 2700000000 HC OXYGEN THERAPY PER DAY

## 2022-03-14 PROCEDURE — 85027 COMPLETE CBC AUTOMATED: CPT

## 2022-03-14 PROCEDURE — 6360000002 HC RX W HCPCS: Performed by: INTERNAL MEDICINE

## 2022-03-14 PROCEDURE — 80053 COMPREHEN METABOLIC PANEL: CPT

## 2022-03-14 PROCEDURE — 6370000000 HC RX 637 (ALT 250 FOR IP): Performed by: INTERNAL MEDICINE

## 2022-03-14 PROCEDURE — 99232 SBSQ HOSP IP/OBS MODERATE 35: CPT | Performed by: NURSE PRACTITIONER

## 2022-03-14 PROCEDURE — 6370000000 HC RX 637 (ALT 250 FOR IP): Performed by: FAMILY MEDICINE

## 2022-03-14 PROCEDURE — 6370000000 HC RX 637 (ALT 250 FOR IP): Performed by: NURSE PRACTITIONER

## 2022-03-14 PROCEDURE — 36415 COLL VENOUS BLD VENIPUNCTURE: CPT

## 2022-03-14 PROCEDURE — 97116 GAIT TRAINING THERAPY: CPT

## 2022-03-14 PROCEDURE — 97110 THERAPEUTIC EXERCISES: CPT

## 2022-03-14 PROCEDURE — 83735 ASSAY OF MAGNESIUM: CPT

## 2022-03-14 RX ORDER — MAGNESIUM SULFATE 1 G/100ML
1000 INJECTION INTRAVENOUS ONCE
Status: COMPLETED | OUTPATIENT
Start: 2022-03-14 | End: 2022-03-14

## 2022-03-14 RX ORDER — CARVEDILOL 6.25 MG/1
6.25 TABLET ORAL 2 TIMES DAILY WITH MEALS
Qty: 60 TABLET | Refills: 3 | Status: ON HOLD | OUTPATIENT
Start: 2022-03-14 | End: 2022-04-09 | Stop reason: HOSPADM

## 2022-03-14 RX ORDER — PREGABALIN 25 MG/1
25 CAPSULE ORAL 2 TIMES DAILY
Qty: 2 CAPSULE | Refills: 0 | Status: ON HOLD
Start: 2022-03-14 | End: 2022-05-29

## 2022-03-14 RX ORDER — CLOTRIMAZOLE 1 G/ML
SOLUTION TOPICAL
Qty: 29.57 ML | Refills: 1 | Status: SHIPPED | OUTPATIENT
Start: 2022-03-14 | End: 2022-03-14 | Stop reason: HOSPADM

## 2022-03-14 RX ORDER — ATORVASTATIN CALCIUM 80 MG/1
80 TABLET, FILM COATED ORAL NIGHTLY
Qty: 30 TABLET | Refills: 3 | Status: SHIPPED | OUTPATIENT
Start: 2022-03-14

## 2022-03-14 RX ORDER — TORSEMIDE 100 MG/1
50 TABLET ORAL 2 TIMES DAILY
Qty: 30 TABLET | Refills: 2 | Status: ON HOLD | OUTPATIENT
Start: 2022-03-14 | End: 2022-04-09 | Stop reason: HOSPADM

## 2022-03-14 RX ORDER — ALLOPURINOL 300 MG/1
300 TABLET ORAL DAILY
Qty: 30 TABLET | Refills: 0
Start: 2022-03-15

## 2022-03-14 RX ORDER — METOLAZONE 5 MG/1
TABLET ORAL
Qty: 12 TABLET | Refills: 2 | Status: ON HOLD | OUTPATIENT
Start: 2022-03-14 | End: 2022-04-09 | Stop reason: HOSPADM

## 2022-03-14 RX ADMIN — ALLOPURINOL 300 MG: 300 TABLET ORAL at 09:50

## 2022-03-14 RX ADMIN — HYDRALAZINE HYDROCHLORIDE 10 MG: 10 TABLET, FILM COATED ORAL at 05:59

## 2022-03-14 RX ADMIN — ASPIRIN 81 MG 81 MG: 81 TABLET ORAL at 09:50

## 2022-03-14 RX ADMIN — INSULIN LISPRO 6 UNITS: 100 INJECTION, SOLUTION INTRAVENOUS; SUBCUTANEOUS at 09:51

## 2022-03-14 RX ADMIN — INSULIN GLARGINE 60 UNITS: 100 INJECTION, SOLUTION SUBCUTANEOUS at 09:52

## 2022-03-14 RX ADMIN — SODIUM CHLORIDE, PRESERVATIVE FREE 10 ML: 5 INJECTION INTRAVENOUS at 09:50

## 2022-03-14 RX ADMIN — INSULIN LISPRO 5 UNITS: 100 INJECTION, SOLUTION INTRAVENOUS; SUBCUTANEOUS at 09:51

## 2022-03-14 RX ADMIN — CARVEDILOL 6.25 MG: 6.25 TABLET, FILM COATED ORAL at 09:49

## 2022-03-14 RX ADMIN — MAGNESIUM SULFATE HEPTAHYDRATE 1000 MG: 1 INJECTION, SOLUTION INTRAVENOUS at 10:44

## 2022-03-14 RX ADMIN — TORSEMIDE 50 MG: 100 TABLET ORAL at 09:49

## 2022-03-14 RX ADMIN — ISOSORBIDE MONONITRATE 30 MG: 30 TABLET, EXTENDED RELEASE ORAL at 09:50

## 2022-03-14 RX ADMIN — INSULIN LISPRO 5 UNITS: 100 INJECTION, SOLUTION INTRAVENOUS; SUBCUTANEOUS at 13:13

## 2022-03-14 RX ADMIN — SENNOSIDES 8.6 MG: 8.6 TABLET, COATED ORAL at 09:50

## 2022-03-14 RX ADMIN — HYDRALAZINE HYDROCHLORIDE 10 MG: 10 TABLET, FILM COATED ORAL at 14:33

## 2022-03-14 RX ADMIN — INSULIN LISPRO 6 UNITS: 100 INJECTION, SOLUTION INTRAVENOUS; SUBCUTANEOUS at 13:13

## 2022-03-14 RX ADMIN — OXYCODONE AND ACETAMINOPHEN 1 TABLET: 5; 325 TABLET ORAL at 14:33

## 2022-03-14 RX ADMIN — OXYCODONE AND ACETAMINOPHEN 1 TABLET: 5; 325 TABLET ORAL at 04:33

## 2022-03-14 RX ADMIN — PREGABALIN 25 MG: 25 CAPSULE ORAL at 09:49

## 2022-03-14 RX ADMIN — PANTOPRAZOLE SODIUM 40 MG: 40 TABLET, DELAYED RELEASE ORAL at 09:49

## 2022-03-14 RX ADMIN — BUTALBITAL, ACETAMINOPHEN, AND CAFFEINE 1 TABLET: 50; 325; 40 TABLET ORAL at 04:33

## 2022-03-14 RX ADMIN — SPIRONOLACTONE 25 MG: 25 TABLET ORAL at 09:50

## 2022-03-14 ASSESSMENT — PAIN SCALES - GENERAL
PAINLEVEL_OUTOF10: 2
PAINLEVEL_OUTOF10: 0
PAINLEVEL_OUTOF10: 8
PAINLEVEL_OUTOF10: 8
PAINLEVEL_OUTOF10: 0
PAINLEVEL_OUTOF10: 0

## 2022-03-14 ASSESSMENT — PAIN DESCRIPTION - ORIENTATION: ORIENTATION: LEFT

## 2022-03-14 ASSESSMENT — PAIN DESCRIPTION - DESCRIPTORS: DESCRIPTORS: ACHING;CONSTANT

## 2022-03-14 ASSESSMENT — PAIN DESCRIPTION - LOCATION: LOCATION: GENERALIZED;EAR

## 2022-03-14 ASSESSMENT — PAIN DESCRIPTION - FREQUENCY: FREQUENCY: CONTINUOUS

## 2022-03-14 ASSESSMENT — PAIN DESCRIPTION - ONSET: ONSET: ON-GOING

## 2022-03-14 ASSESSMENT — PAIN DESCRIPTION - PAIN TYPE: TYPE: ACUTE PAIN;CHRONIC PAIN

## 2022-03-14 NOTE — PLAN OF CARE
Problem: OXYGENATION/RESPIRATORY FUNCTION  Goal: Patient will maintain patent airway  3/14/2022 1126 by Freida Hairston RN  Outcome: Ongoing  Note:   Patient's EF (Ejection Fraction) is greater than 40%    Heart Failure Medications:  Diuretics[de-identified] Torsemide and Spironolactone    (One of the following REQUIRED for EF </= 40%/SYSTOLIC FAILURE but MAY be used in EF% >40%/DIASTOLIC FAILURE)        ACE[de-identified] None        ARB[de-identified] None         ARNI[de-identified] None    (Beta Blockers)  NON- Evidenced Based Beta Blocker (for EF% >40%/DIASTOLIC FAILURE): None    Evidenced Based Beta Blocker::(REQUIRED for EF% <40%/SYSTOLIC FAILURE) Carvedilol- Coreg  . .................................................................................................................................................. Patient's weights and intake/output reviewed: Yes    Patient's Last Weight: 282 lbs obtained by standing scale. Difference of 1 lbs less than last documented weight. Intake/Output Summary (Last 24 hours) at 3/14/2022 1126  Last data filed at 3/14/2022 1119  Gross per 24 hour   Intake 600 ml   Output 0 ml   Net 600 ml       Comorbidities Reviewed Yes    Patient has a past medical history of Anemia, Angina, Arthritis, CAD (coronary artery disease), CHF (congestive heart failure) ([facility] CKD (chronic kidney disease) stage 3, GFR 30-59 ml/min ([facility]), Clostridium difficile diarrhea, Diabetes mellitus ([address] Diabetic neuropathy ([facility] Disease of blood and blood forming organ, Dizziness, GERD (gastroesophageal reflux disease), Headache, Hearing loss, Hyperlipidemia, Hypertension, Kidney stone, Refusal of blood transfusions as patient is Uatsdin, Sleep apnea, Tinnitus, Venous ulcer ([facility] and Wound, open.      >>For CHF and Comorbidity documentation on Education Time and Topics, please see Education Tab    Progressive Mobility Assessment:  What is this patient's Current Level of Mobility?: Ambulatory-Up Ad Mami  How was this patient Mobilized today?: Edge of Bed, Up to Chair,  Up to Toilet/Shower, and Up in Room, ambulated 20 ft                 With Whom? Nurse, PCA, PT, OT, and Self                 Level of Difficulty/Assistance: Independent     Pt resting in bed at this time on  3 L O2. Pt denies shortness of breath. Pt with pitting lower extremity edema. Patient and/or Family's stated Goal of Care this Admission: reduce shortness of breath, increase activity tolerance, better understand heart failure and disease management, be more comfortable, and reduce lower extremity edema prior to discharge        :      Problem: Pain:  Goal: Pain level will decrease  Description: Pain level will decrease  3/14/2022 1126 by Ravindra Sommer RN  Outcome: Ongoing  Note: Pt will be satisfied with pain control. Pt uses numeric pain rating scale with reassessments after pain med administration. Will continue to monitor progression throughout shift. Problem: Falls - Risk of:  Goal: Will remain free from falls  Description: Will remain free from falls  3/14/2022 1126 by Ravindra Sommer RN  Outcome: Ongoing  Note: Pt refusing bed alarm at this time. Problem: Serum Glucose Level - Abnormal:  Goal: Ability to maintain appropriate glucose levels will improve  Description: Ability to maintain appropriate glucose levels will improve  Outcome: Ongoing  Note: Pt will have accuchecks before meals and at bedtime with sliding scale insulin in place for coverage. Will continue to monitor for signs and symptoms of hypoglycemia and hyperglycemia throughout shift.       Problem: Skin Integrity:  Goal: Will show no infection signs and symptoms  Description: Will show no infection signs and symptoms  3/14/2022 1126 by Ravindra Sommer RN  Outcome: Ongoing

## 2022-03-14 NOTE — PLAN OF CARE
Problem: OXYGENATION/RESPIRATORY FUNCTION  Goal: Patient will maintain patent airway  3/14/2022 0154 by Kailee Garza RN  Outcome: Ongoing  Note:   Patient's EF (Ejection Fraction) is greater than 40%    Heart Failure Medications:  Diuretics[de-identified] Torsemide and Spironolactone    (One of the following REQUIRED for EF </= 40%/SYSTOLIC FAILURE but MAY be used in EF% >40%/DIASTOLIC FAILURE)        ACE[de-identified] None        ARB[de-identified] None         ARNI[de-identified] None    (Beta Blockers)  NON- Evidenced Based Beta Blocker (for EF% >40%/DIASTOLIC FAILURE): None    Evidenced Based Beta Blocker::(REQUIRED for EF% <40%/SYSTOLIC FAILURE) Carvedilol- Coreg  . .................................................................................................................................................. Patient's weights and intake/output reviewed: Yes    Patient's Last Weight: 283 lbs obtained by standing scale. Difference of 2 lbs more than last documented weight. Intake/Output Summary (Last 24 hours) at 3/14/2022 0153  Last data filed at 3/13/2022 1952  Gross per 24 hour   Intake 0 ml   Output 0 ml   Net 0 ml       Comorbidities Reviewed Yes    Patient has a past medical history of Anemia, Angina, Arthritis, CAD (coronary artery disease), CHF (congestive heart failure) ([facility] [address] CKD (chronic kidney disease) stage 3, GFR 30-59 ml/min ([facility]), Clostridium difficile diarrhea, Diabetes mellitus ([facility] [address] Diabetic neuropathy ([facility] [address] Disease of blood and blood forming organ, Dizziness, GERD (gastroesophageal reflux disease), Headache, Hearing loss, Hyperlipidemia, Hypertension, Kidney stone, Refusal of blood transfusions as patient is Confucianism, Sleep apnea, Tinnitus, Venous ulcer ([facility] and Wound, open.      >>For CHF and Comorbidity documentation on Education Time and Topics, please see Education Tab    Progressive Mobility Assessment:  What is this patient's Current Level of Mobility?: Ambulatory-Up Ad Mami  How was this patient Mobilized today?: Edge of Bed, Up to Chair,  Up to Toilet/Shower, and Up in Room, ambulated 10 ft                 With Whom? Nurse, PCA, and Self                 Level of Difficulty/Assistance: Independent     Pt resting in bed at this time on  3 L O2. Pt denies shortness of breath. Pt with pitting lower extremity edema. Patient and/or Family's stated Goal of Care this Admission: reduce shortness of breath, increase activity tolerance, better understand heart failure and disease management, be more comfortable, and reduce lower extremity edema prior to discharge        Intake/Output Summary (Last 24 hours) at 3/13/2022 1638  Last data filed at 3/13/2022 0853  Gross per 24 hour   Intake 120 ml   Output 0 ml   Net 120 ml       Comorbidities Reviewed Yes    Patient has a past medical history of Anemia, Angina, Arthritis, CAD (coronary artery disease), CHF (congestive heart failure) ([address] CKD (chronic kidney disease) stage 3, GFR 30-59 ml/min ([facility]), Clostridium difficile diarrhea, Diabetes mellitus ([address] Diabetic neuropathy ([address] Disease of blood and blood forming organ, Dizziness, GERD (gastroesophageal reflux disease), Headache, Hearing loss, Hyperlipidemia, Hypertension, Kidney stone, Refusal of blood transfusions as patient is Taoist, Sleep apnea, Tinnitus, Venous ulcer ([address] and Wound, open. >>For CHF and Comorbidity documentation on Education Time and Topics, please see Education Tab    Progressive Mobility Assessment:  What is this patient's Current Level of Mobility?: Ambulatory-Up Ad Mami  How was this patient Mobilized today?: Edge of Bed, Up to Chair,  Up to Toilet/Shower, and Up in Room, ambulated 40 ft                 With Whom? Self                 Level of Difficulty/Assistance: Independent     Pt resting in bed at this time on  3 L O2. Pt denies shortness of breath. Pt with pitting lower extremity edema.      Patient and/or Family's stated Goal of Care this Admission: increase activity tolerance, be more comfortable, and reduce lower extremity edema prior to discharge        :

## 2022-03-14 NOTE — PROGRESS NOTES
Physical Therapy  Facility/Department: [facility] A2 CARD TELEMETRY  Daily Treatment Note  NAME: Avtar Silva  : 1955  MRN: [id number]    Date of Service: 3/14/2022    Discharge Recommendations:  Home with assist PRN,Home with Home health PT   PT Equipment Recommendations  Equipment Needed: No  Other: patient said he already has a walker at home    Assessment   Body structures, Functions, Activity limitations: Decreased functional mobility ; Decreased endurance;Decreased posture  Assessment: Pt participated very well with PT today with no c/o pain. Participated well in seated LE ther ex with very minimal cues for technique. Able to amb increased distance with support of RW and performed bed mobility and transfers with (I)/near-(I). Recommend pt return home with assist PRN from family & home PT services. Treatment Diagnosis: Decreased independence with functional mobility  Specific instructions for Next Treatment: Progress mobility as tolerated  Prognosis: Good  Decision Making: Low Complexity  PT Education: Goals; Functional Mobility Training;Precautions;PT Role;Transfer Training;Pressure Relief; Injury Prevention;Gait Training;Equipment;Plan of Care;General Safety; Disease Specific Education;Home Exercise Program  Patient Education: Disease-Specific Education: Patient educated on importance of OOB mobility, prevention of complications of bedrest, and general safety during hospitalization. Patient verbalized understanding. REQUIRES PT FOLLOW UP: Yes  Activity Tolerance: Patient Tolerated treatment well  Activity Tolerance: Vitals while seated EOB on 3L O2: BP = 129/58, O2 sat = 95%, HR = 69 bpm.  After amb 75 feet on 3L O2, O2 sat = 95-96%. Patient Diagnosis(es): The primary encounter diagnosis was Acute on chronic congestive heart failure, unspecified heart failure type ([facility] [address]  Diagnoses of TAYLOR (dyspnea on exertion) and Acute otitis media, unspecified otitis media type were also pertinent to this visit.     has a past medical history of Anemia, Angina, Arthritis, CAD (coronary artery disease), CHF (congestive heart failure) ([facility] [address] CKD (chronic kidney disease) stage 3, GFR 30-59 ml/min ([facility]), Clostridium difficile diarrhea, Diabetes mellitus ([facility] [address] Diabetic neuropathy ([facility] [address] Disease of blood and blood forming organ, Dizziness, GERD (gastroesophageal reflux disease), Headache, Hearing loss, Hyperlipidemia, Hypertension, Kidney stone, Refusal of blood transfusions as patient is [employer], Sleep apnea, Tinnitus, Venous ulcer ([facility] [address] and Wound, open. has a past surgical history that includes hernia repair (age2); Cholecystectomy (9/2011); other surgical history (11-16-11 REPAIR LOWER STERNAL INCISION, REMOVAL OF ONE STERNAL WIRE,); Upper gastrointestinal endoscopy; Cardiac surgery; other surgical history (10/10/2014); and Pain management procedure (N/A, 2/10/2022). Restrictions  Restrictions/Precautions  Restrictions/Precautions: General Precautions,Fall Risk  Position Activity Restriction  Other position/activity restrictions: Up with assistance, IV, 3L O2, telemetry     Subjective   General  Chart Reviewed: Yes  Additional Pertinent Hx: HPI per chart, Antonina Ochoa is a(n) 75 y.o. male who presents to the hospital with CHF exacerbation, with left ear otalgia - no improvement with antibiotic eardrops, Augmentin. Left ear suctioned/debrided\"  Response To Previous Treatment: Patient with no complaints from previous session. Family / Caregiver Present: No  Referring Practitioner: Willard Cruz. MD Cat  Subjective  Subjective: Patient agreed to participate with PT this morning, very pleasant and cooperative. Sandi Stephen to know the \"plan for today. \"  General Comment  Comments: Cleared for therapy by RN. Pt resting in bed upon entry of PT.   Pain Screening  Patient Currently in Pain: Denies       Orientation  Orientation  Overall Orientation Status: Within Functional Limits    Objective   Bed mobility  Supine to Sit: Modified independent  Sit to Supine: Unable to assess (pt up in chair after PT session)  Scooting: Independent (to scoot forward to EOB and up in bed)     Transfers  Sit to Stand: Modified independent  Stand to sit: Modified independent  Bed to Chair: Supervision (using RW)     Ambulation  Surface: level tile  Device: Rolling Walker  Assistance: Supervision  Quality of Gait: Pt amb with more upright posture today, although still with slow amanuel and increased B stance time. Safe and steady, no LOB. Gait Deviations: Slow Amanuel;Decreased step length;Decreased step height  Distance: x 80 feet     Balance  Posture: Fair  Sitting - Static: Good  Sitting - Dynamic: Good  Standing - Static: Good  Standing - Dynamic: Good;-     Exercises  Hip Flexion: seated marching x 15 BLE  Hip Abduction: seated clamshells x 20 BLE  Knee Long Arc Quad: x 20 BLE (cues needed to move through exercises more slowly)  Ankle Pumps: x 20 BLE     AM-PAC Score  AM-PAC Inpatient Mobility without Stair Climbing Raw Score : 19 (03/14/22 1343)  AM-PAC Inpatient without Stair Climbing T-Scale Score : 56.04 (03/14/22 1343)  Mobility Inpatient CMS 0-100% Score: 12.25 (03/14/22 1343)  Mobility Inpatient without Stair CMS G-Code Modifier : CI (03/14/22 1343)    Goals  Short term goals  Time Frame for Short term goals: 1 week 3/18/22  Short term goal 1: Sit <> stand with modified independence - MET 3/14/22  Short term goal 2: Bed <> chair with least restrictive assistive device and modified independence  Short term goal 3: Ambulate 150 feet with least restrictive assistive device and modified independence. Short term goal 4: Ascend 3 steps with rail and modified independence  Short term goal 5: By 3/15/22 Patient will tolerate 12-15 reps of his exercises to maximize his strength/endurance - MET 3/14/22, goal ongoing  Patient Goals   Patient goals : To become stronger. To go to assisted living.     Plan    Times per week: 3-5x/week in acute care  Times per day: Daily  Plan weeks: 1 week 3/18/22  Specific instructions for Next Treatment: Progress mobility as tolerated  Current Treatment Recommendations: Strengthening,Functional Mobility Training,Equipment Evaluation, Education, & procurement,Safety Education & Training,Gait Training,ROM,Transfer Training,Balance Brady Borges Management,Patient/Caregiver Education & [employer] Exercise Program  Safety Devices: All fall risk precautions in place,Gait belt,Call light within reach,Nurse notified,Left in chair     Therapy Time   Individual Concurrent Group Co-treatment   Time In 1040         Time Out 1119         Minutes 39         Timed Code Treatment Minutes: [address], [place] #[id number]    If pt is unable to be seen after this session, please let this note serve as discharge summary. Please see case management note for discharge disposition. Thank you.

## 2022-03-14 NOTE — CARE COORDINATION
CASE MANAGEMENT DISCHARGE SUMMARY      Discharge to: home w/[facility], non skilled; [facility], skilled, [employer] [facility]  IMM given: (date) 03/14/2022    New Durable Medical Equipment ordered/agency: [employer] [facility], friend bringing tank    Transportation: private  Confirmed discharge plan with:     Patient: yes, pt stated he has already contacted his friends     RN, name: Mason Haney RN    Note: [facility] informed of pt d/c. [facility] will pull orders from [employer]. MD made aware of need for orders. Friends will bring tanks. Pt on baseline O2. [facility] [id number] and pt aware that they will contact him to set up tour.   Kassandra Sanchez, RN

## 2022-03-14 NOTE — PLAN OF CARE
Problem: Falls - Risk of:  Goal: Will remain free from falls  Description: Will remain free from falls  3/14/2022 0152 by Collette Sale, RN  Outcome: Ongoing     Problem: Pain:  Goal: Pain level will decrease  Description: Pain level will decrease  3/14/2022 0152 by Collette Sale, RN  Outcome: Ongoing    Problem: Discharge Planning:  Goal: Discharged to appropriate level of care  Description: Discharged to appropriate level of care  Outcome: Ongoing     Problem: Skin Integrity:  Goal: Will show no infection signs and symptoms  Description: Will show no infection signs and symptoms  3/14/2022 0152 by Collette Sale, RN  Outcome: Ongoing

## 2022-03-14 NOTE — DISCHARGE SUMMARY
Hospital Medicine Discharge Summary    Patient ID: Skyler Thomas      Patient's PCP: PATRICK Garcia CNP    Admit Date: 3/7/2022     Discharge Date: 3/14/2022      Admitting Provider: No admitting provider for patient encounter. Discharge Provider: Whitney Newberry MD     Discharge Diagnoses: Active Hospital Problems    Diagnosis     Acute on chronic diastolic CHF (congestive heart failure) ([facility] [I50.33]     Morbid obesity ([facility] [address] [E66.01]     Essential hypertension [I10]     DM (diabetes mellitus) ([facility] [address] [E11.9]        The patient was seen and examined on day of discharge and this discharge summary is in conjunction with any daily progress note from day of discharge. Hospital Course:   History Of Present Illness:   78 y.o. male who presented to [facility] with SOB. PMHx significant for former smoking, HTN, HLD, DM2, CAD s/p CABG in 2010, and dCHF. He has had recurrent hospitalizations for CHF, most recently 1/2022. He reports compliance with his diuretic regimen of Torsemide, Aldactone and Metolazone. He reports progressive increase in SOB/TAYLOR as well as worsening LE edema. He also notes recent issues with left ear infection. He was initially started on Cipro drops and later Augmentin (3/2/22) by ENT Dr. Deanna Martinez. He is concerned that the ear infection has not resolved as he has continued pain         Acute on chronic diastolic congestive heart failure  Diuretics switched to oral by cardiology  Continued to monitor daily weights, intake and output  Fluid balance measurement appears inaccurate. Patient feels better.  .     Left ear otitis externa  Being followed by ENT.  Repeat debridement  done on March 14   -ENT arranged outpt ear drops on discharge and will f/u as outpt    Chronic hypoxemic respiratory failure  Oxygen requirement at baseline.   Continue oxygen supplementation and monitored  Overall stable     Chronic kidney disease stage III  Baseline is 1.2-1.5 creatinine.  Last creatinine is 1.3, which is in baseline. Followed daily basic metabolic panel.       Chronic pain- continued home pain meds/lyrica    Diabetes mellitus type 2  Continue sliding scale insulin.  Blood sugars mostly below 200.  -continued to follow with no change in insulin dose.          Physical Exam Performed:     BP (!) 123/58   Pulse 86   Temp 97.9 °F (36.6 °C) (Oral)   Resp 16   Ht 5' 9\" (1.753 m)   Wt 282 lb 9.6 oz (128.2 kg)   SpO2 91%   BMI 41.73 kg/m²       General appearance:  No apparent distress, appears stated age and cooperative. HEENT:  Normal cephalic, atraumatic without obvious deformity. Pupils equal, round, and reactive to light. Extra ocular muscles intact. Conjunctivae/corneas clear. Neck: Supple, with full range of motion. No jugular venous distention. Trachea midline. Respiratory:  Normal respiratory effort. Clear to auscultation, bilaterally without Rales/Wheezes/Rhonchi. Cardiovascular:  Regular rate and rhythm with normal S1/S2 without murmurs, rubs or gallops. Abdomen: Soft, non-tender, non-distended with normal bowel sounds. Musculoskeletal:  No clubbing, cyanosis or edema bilaterally. Full range of motion without deformity. Skin: Skin color, texture, turgor normal.  No rashes or lesions. Neurologic:  Neurovascularly intact without any focal sensory/motor deficits. Cranial nerves: II-XII intact, grossly non-focal.  Psychiatric:  Alert and oriented, thought content appropriate, normal insight  Capillary Refill: Brisk,< 3 seconds   Peripheral Pulses: +2 palpable, equal bilaterally       Labs:  For convenience and continuity at follow-up the following most recent labs are provided:      CBC:    Lab Results   Component Value Date    WBC 6.4 03/14/2022    HGB 10.3 03/14/2022    HCT 31.6 03/14/2022     03/14/2022       Renal:    Lab Results   Component Value Date     03/14/2022    K 3.8 03/14/2022    K 3.7 03/07/2022    CL 98 03/14/2022    CO2 32 03/14/2022    BUN 49 03/14/2022    CREATININE 1.5 03/14/2022    CALCIUM 8.9 03/14/2022    PHOS 3.8 12/24/2019         Significant Diagnostic Studies    Radiology:   NM Cardiac Stress Test Nuclear Imaging   Final Result      XR CHEST PORTABLE   Final Result   Diffuse airspace opacities consistent with multifocal pneumonia. Stable cardiomegaly. Consults:     IP CONSULT TO HOSPITALIST  IP CONSULT TO HEART FAILURE NURSE/COORDINATOR  IP CONSULT TO DIETITIAN  IP CONSULT TO CARDIOLOGY  IP CONSULT TO OTOLARYNGOLOGY  IP CONSULT TO HOME CARE NEEDS    Disposition:  home     Condition at Discharge: Stable    Discharge Instructions/Follow-up:  Cards as outpt    Code Status:  Full Code     Activity: activity as tolerated    Diet: cardiac diet and low sodium, diabetic      Discharge Medications:     Discharge Medication List as of 3/14/2022  4:37 PM           Details   pregabalin (LYRICA) 25 MG capsule Take 1 capsule by mouth 2 times daily for 1 day., Disp-2 capsule, R-0NO PRINT              Details   atorvastatin (LIPITOR) 80 MG tablet Take 1 tablet by mouth nightly, Disp-30 tablet, R-3Normal      carvedilol (COREG) 6.25 MG tablet Take 1 tablet by mouth 2 times daily (with meals), Disp-60 tablet, R-3Normal      metOLazone (ZAROXOLYN) 5 MG tablet Once a week on thursdays, Disp-12 tablet, R-2Normal      torsemide (DEMADEX) 100 MG tablet Take 0.5 tablets by mouth 2 times daily, Disp-30 tablet, R-2Normal      allopurinol (ZYLOPRIM) 300 MG tablet Take 1 tablet by mouth daily, Disp-30 tablet, R-0NO PRINT              Details   aspirin-acetaminophen-caffeine (EXCEDRIN MIGRAINE) 250-250-65 MG per tablet Take 1 tablet by mouth every 6 hours as needed for HeadachesHistorical Med      oxyCODONE-acetaminophen (PERCOCET) 5-325 MG per tablet Take 1 tablet by mouth 4 times daily for 30 days. , Disp-120 tablet, R-0Normal      naloxone 4 MG/0.1ML LIQD nasal spray 1 spray by Nasal route as needed for Opioid Reversal, Disp-1 each, R-0Normal      spironolactone (ALDACTONE) 25 MG tablet Take 1 tablet by mouth daily, Disp-30 tablet, R-3Normal      linaCLOtide (LINZESS PO) Take by mouthHistorical Med      acetaminophen (TYLENOL) 500 MG tablet Take 2 tablets by mouth 4 times daily as needed for Pain, Disp-60 tablet,R-0Print      hydrocortisone (ANUSOL-HC) 2.5 % rectal cream Place rectally 2 times daily. , Disp-1 Tube, R-0, Print      senna (SENOKOT) 8.6 MG TABS tablet Take 1 tablet by mouth 2 times daily, Disp-120 tablet, R-0Print      docusate sodium (COLACE, DULCOLAX) 100 MG CAPS Take 100 mg by mouth 2 times dailyOTC      insulin aspart (NOVOLOG) 100 UNIT/ML injection vial Inject into the skin 3 times daily (before meals) 50 units with breakfast, 20 units with lunch and 60 units with dinnerHistorical Med      insulin glargine (LANTUS) 100 UNIT/ML injection vial Inject 55 Units into the skin 2 times daily, Disp-1 vial, R-1      Compression Stockings MISC Starting 6/15/2016, Until Discontinued, Disp-2 each, R-1, Print2 pair, knee high compression stockings, fitted/ zippered      silver sulfADIAZINE (SILVADENE) 1 % cream Apply to BL lower leg ulcers daily, Disp-20 g, R-0, Print      nitroGLYCERIN (NITROSTAT) 0.4 MG SL tablet Place 1 tablet under the tongue every 5 minutes as needed, Disp-25 tablet, R-1      fluticasone (FLONASE) 50 MCG/ACT nasal spray 1 spray by Nasal route nightly as needed. aspirin 81 MG chewable tablet Take 1 tablet by mouth daily. , Disp-30 tablet      isosorbide mononitrate (IMDUR) 30 MG CR tablet Take 1 tablet by mouth daily. , Disp-30 tablet, R-0      ferrous sulfate 325 (65 FE) MG tablet Take 325 mg by mouth 3 times daily (with meals). omeprazole (PRILOSEC) 20 MG capsule Take 20 mg by mouth 2 times daily. Time Spent on discharge is more than 30 minutes in the examination, evaluation, counseling and review of medications and discharge plan.       Signed:    Yakov Strong MD   3/14/2022      Thank you PATRICK Hansen CNP for the opportunity to be involved in this patient's care. If you have any questions or concerns please feel free to contact me at [phone].

## 2022-03-14 NOTE — PROGRESS NOTES
Comprehensive Nutrition Assessment    Type and Reason for Visit:  Initial,RD Nutrition Re-Screen/LOS    Nutrition Recommendations/Plan:   1. Modify diet to carb control w/ [place]   2. Monitor for any diet education questions   3. Monitor nutrition adequacy, pertinent labs, bowel habits, wt changes, and clinical progress    Nutrition Assessment:  LOS assessment: 75 y.o w/ PMH of DM2, CAD s/p CABG in 2010, and CHF admitted for SOB. Otolaryngology following for ear fungal infection. On carb control diet. Pt w/ good intake and appetite, consuming % of most meals. RD to add [place] restriction on diet. Pt would like double protein for meals, RD to add meal tray ticket. Previous CHF education provided on 3/8, pt had no other questions at this time. RD previously provided pt w/ handout about canned low sodium foods. Will continue to monitor. Malnutrition Assessment:  Malnutrition Status:  No malnutrition    Context:  Acute Illness       Nutrition Related Findings:  + BM yesterday, per pt. -221 x 24 hours, monitoring w/ insulin. hemoglobin A1C 7.6 on 1/31/22. Difficulty chewing, pt chooses softer food independently. + 1 pitting edema RLE, LLE      Wounds:  None (fungal infection in ear.)       Current Nutrition Therapies:    ADULT DIET;  Regular; 4 carb choices (60 gm/meal)    Anthropometric Measures:  · Height: 5' 9\" (175.3 cm)  · Current Body Weight: 282 lb (127.9 kg)     · Ideal Body Weight: 160 lbs; % Ideal Body Weight 176.3 %   · BMI: 41.6  · BMI Categories: Obese Class 3 (BMI 40.0 or greater)       Nutrition Diagnosis:   No nutrition diagnosis at this time     Nutrition Interventions:   Food and/or Nutrient Delivery:  Modify Current Diet  Nutrition Education/Counseling:  Education completed   Coordination of Nutrition Care:  Continue to monitor while inpatient    Goals:  N/a       Nutrition Monitoring and Evaluation:   Behavioral-Environmental Outcomes:  Beliefs and Attitutes,Knowledge or Skill,Readiness for Change   Food/Nutrient Intake Outcomes:  Food and Nutrient Intake  Physical Signs/Symptoms Outcomes:  Biochemical Data,Fluid Status or Edema     Discharge Planning:    Continue current diet     Electronically signed by Nicholas Adame MS, RD, LD on 3/14/22 at 1:50 PM EDT    Contact: Office: [phone]; [address] [place] [address] Road: [phone]

## 2022-03-14 NOTE — PROGRESS NOTES
[address] [facility] SURGERY  PROGRESS NOTE      Patient Name: Maria Guadalupe Escobar  Medical Record Number:  [id number]  Primary Care Physician:  PATRICK Soto - CNP  Date of Consultation: 3/14/2022    Chief Complaint: Left ear otalgia    INTERVAL HISTORY  Sravanthi Medley is a(n) 75 y.o. male who presents to the hospital with CHF exacerbation, with left ear otalgia - no improvement with antibiotic eardrops, Augmentin. Left ear suctioned/debrided last week- significant improvement in pain, using warm compresses to the left temporomandibular joint.      Patient Active Problem List   Diagnosis    DM (diabetes mellitus) ([address]    Coronary artery disease involving native coronary artery of native heart without angina pectoris    Anemia of chronic disease    Essential hypertension    Hyperkalemia    Chronic diastolic heart failure ([address]    Pulmonary hypertension due to left ventricular diastolic dysfunction ([address]    Morbid obesity ([address]    S/P CABG x 3    DM2 (diabetes mellitus, type 2) ([address]    Morbid obesity with BMI of 50.0-59.9, adult ([address]    HLD (hyperlipidemia)    Cardiomyopathy ([address]    Productive cough    Chronic pain    Severe obstructive sleep apnea    Obesity, Class III, BMI 40-49.9 (morbid obesity) (HCC)    Acute diastolic congestive heart failure (HCC)    Degeneration of lumbar or lumbosacral intervertebral disc    Displacement of lumbar intervertebral disc without myelopathy    Lumbosacral spondylosis without myelopathy    Lumbar facet arthropathy    Disc displacement, lumbar    Spinal stenosis of lumbar region    Mixed conductive and sensorineural hearing loss of left ear with restricted hearing of right ear    Tympanic membrane perforation, left    Chest pain    Cor pulmonale, chronic (HCC)    Pulmonary hypertension due to alveolar hypoventilation disorder (HCC)    Nonrheumatic aortic valve stenosis    Chronic neck pain    Dyspnea    Acute diastolic CHF (congestive heart failure) (HCC)    Cervical stenosis of spinal canal    Degeneration of cervical intervertebral disc    Cervical radiculitis    Acute on chronic diastolic CHF (congestive heart failure) [facility])     Past Surgical History:   Procedure Laterality Date   [facility] CARDIAC SURGERY      Dec 27 2010, triple bypass    CHOLECYSTECTOMY  2011    HERNIA REPAIR  age2    OTHER SURGICAL HISTORY  11-16-11 REPAIR LOWER STERNAL INCISION, REMOVAL OF ONE STERNAL WIRE,    OTHER SURGICAL HISTORY  10/10/2014    phacoemulsification of cataract with intraocular lens implant left eye    PAIN MANAGEMENT PROCEDURE N/A 2/10/2022    MIDLINE CERVICAL SIX SEVEN EPIDURAL STEROID INJECTION SITE CONFIRMED BY FLUOROSCOPY performed by Yvonne العلي MD at [facility] OR    UPPER GASTROINTESTINAL ENDOSCOPY       Family History   Problem Relation Age of Onset    Heart Disease Mother     Heart Disease Father     Cancer Father     Diabetes Brother     Diabetes Brother      Social History     Socioeconomic History    Marital status: Single     Spouse name: Not on file    Number of children: Not on file    Years of education: Not on file    Highest education level: Not on file   Occupational History    Not on file   Tobacco Use    Smoking status: Former Smoker     Packs/day: 1.00     Years: 16.00     Pack years: 16.00     Quit date: 1/10/1988     Years since quittin.1    Smokeless tobacco: Never Used    Tobacco comment: 22 yrs ago   Vaping Use    Vaping Use: Never used   Substance and Sexual Activity    Alcohol use: No     Alcohol/week: 0.0 standard drinks    Drug use: No    Sexual activity: Not on file   Other Topics Concern    Not on file   Social History Narrative    Not on file     Social Determinants of Health     Financial Resource Strain:     Difficulty of Paying Living Expenses: Not on file   Food Insecurity:     Worried About Running Out of Food in the Last Year: Not on file    James rob Food in the Last Year: Not on file   Transportation Needs:     Lack of Transportation (Medical): Not on file    Lack of Transportation (Non-Medical): Not on file   Physical Activity:     Days of Exercise per Week: Not on file    Minutes of Exercise per Session: Not on file   Stress:     Feeling of Stress : Not on file   Social Connections:     Frequency of Communication with Friends and Family: Not on file    Frequency of Social Gatherings with Friends and Family: Not on file    Attends Gnosticist Services: Not on file    Active Member of [address] [place] [address] [employer] or Organizations: Not on file    Attends Club or Organization Meetings: Not on file    Marital Status: Not on file   Intimate Partner Violence:     Fear of Current or Ex-Partner: Not on file    Emotionally Abused: Not on file    Physically Abused: Not on file    Sexually Abused: Not on file   Housing Stability:     Unable to Pay for Housing in the Last Year: Not on file    Number of [place] in the Last Year: Not on file    Unstable Housing in the Last Year: Not on file       DRUG/FOOD ALLERGIES: Amitriptyline, Celecoxib, Cymbalta [duloxetine hcl], Elavil [amitriptyline hcl], Gabapentin, and Nsaids    CURRENT MEDICATIONS  Prior to Admission medications    Medication Sig Start Date End Date Taking? Authorizing Provider   ciprofloxacin (CILOXAN) 0.3 % ophthalmic solution Apply 4 drops twice daily to the left ear 3/1/22   Henna Rush MD   aspirin-acetaminophen-caffeine (EXCEDRIN MIGRAINE) 318-627-84 MG per tablet Take 1 tablet by mouth every 6 hours as needed for Headaches    Historical Provider, MD   oxyCODONE-acetaminophen (PERCOCET) 5-325 MG per tablet Take 1 tablet by mouth 4 times daily for 30 days.  2/28/22 3/30/22  PATRICK Auguste - CNP   hydrALAZINE (APRESOLINE) 10 MG tablet Take 1 tablet by mouth every 8 hours 1/17/22   Vania Castaneda MD   torsemide (DEMADEX) 100 MG tablet Take half tablet twice per day, but on days when your weight is stockings, fitted/ zippered 6/15/16   PATRICK Mai CNP   silver sulfADIAZINE (SILVADENE) 1 % cream Apply to BL lower leg ulcers daily 2/9/16   Qiana Escamilla MD   nitroGLYCERIN (NITROSTAT) 0.4 MG SL tablet Place 1 tablet under the tongue every 5 minutes as needed 8/17/15   PATRICK Mai CNP   fluticasone (FLONASE) 50 MCG/ACT nasal spray 1 spray by Nasal route nightly as needed. Historical Provider, MD   aspirin 81 MG chewable tablet Take 1 tablet by mouth daily. 1/11/13   Mariana English MD   isosorbide mononitrate (IMDUR) 30 MG CR tablet Take 1 tablet by mouth daily. 1/11/13   Mariana English MD   ferrous sulfate 325 (65 FE) MG tablet Take 325 mg by mouth 3 times daily (with meals). 12/22/10   Historical Provider, MD   omeprazole (PRILOSEC) 20 MG capsule Take 20 mg by mouth 2 times daily.  12/22/10   Historical Provider, MD       PHYSICAL EXAM  /70   Pulse 66   Temp 97.4 °F (36.3 °C) (Axillary)   Resp 19   Ht 5' 9\" (1.753 m)   Wt 282 lb 9.6 oz (128.2 kg)   SpO2 94%   BMI 41.73 kg/m²     GENERAL: No Acute Distress, Alert and Oriented, no hoarseness  EYES: EOMI, Anti-icteric  NOSE: No epistaxis, nasal mucosa within normal limits, no purulent drainage  EARS: Left preauricular pain - EAC occluded with fungal hyphae, suctioned with #7Fr suction  Right EAC clear, TM intact, no middle ear effusions  FACE: 1/6 House-Brackmann Scale, symmetric, sensation equal bilaterally    LABS  Lab Results   Component Value Date    WBC 6.4 03/14/2022    HGB 10.3 (L) 03/14/2022    HCT 31.6 (L) 03/14/2022    MCV 86.0 03/14/2022     03/14/2022     Lab Results   Component Value Date     03/14/2022    K 3.8 03/14/2022    K 3.7 03/07/2022    CL 98 03/14/2022    CO2 32 03/14/2022    BUN 49 03/14/2022    CREATININE 1.5 03/14/2022    GLUCOSE 221 03/14/2022    CALCIUM 8.9 03/14/2022        ASSESSMENT/PLAN  Romeo Monaco is a very pleasant 75 y.o. male with left acute fungal otitis externa  -Ears debrided today  -Keep ears dry - no q-tips, water exposure  -Continue warm compresses to the left temporomandibular joint  -Will prescribe clotrimazole gtt as outpatient - it will come tomorrow if patient is here we will administer, else prescribe to his Miguel A Perdomo per his request  -Follow up in 1 week if discharged

## 2022-03-14 NOTE — PROGRESS NOTES
[facility]   Daily Progress Note    Admit Date:  3/7/2022  HPI:    Chief Complaint   Patient presents with    Shortness of Breath     patient with dyspnea on exertion since last evening. Patient denies pain, home O2 level of 3.5lnc. Interval history: Mau Cole is being followed for shortness of breath. Treated for CHF. Currently on Lasix infusion. Completed stress testing. Subjective:  Mr. Sierra Lopez weight is stable on PO torsemide and Spironolactone (aldactone). Edema is stable. Continues with ear pain.      Objective:   /70   Pulse 66   Temp 97.4 °F (36.3 °C) (Axillary)   Resp 19   Ht 5' 9\" (1.753 m)   Wt 282 lb 9.6 oz (128.2 kg)   SpO2 94%   BMI 41.73 kg/m²       Intake/Output Summary (Last 24 hours) at 3/14/2022 1327  Last data filed at 3/14/2022 1119  Gross per 24 hour   Intake 600 ml   Output 0 ml   Net 600 ml     NYHA: III    Physical Exam:  General:  Awake, alert, NAD, obese  Skin:  Warm and dry  Neck:  JVD difficult to assess due to body habitus  Chest:  Clear to auscultation, no wheezes/rhonchi/rales  Cardiovascular:  RRR S1S2, + murmur no r/g   Abdomen:  Soft, nontender, +bowel sounds  Extremities:  + less  bilateral lower extremity edema    Medications:    torsemide  50 mg Oral BID    spironolactone  25 mg Oral Daily    pregabalin  25 mg Oral BID    insulin lispro  5 Units SubCUTAneous TID WC    atorvastatin  80 mg Oral Nightly    enoxaparin  30 mg SubCUTAneous BID    carvedilol  6.25 mg Oral BID WC    linaclotide  290 mcg Oral QAM AC    allopurinol  300 mg Oral Daily    aspirin  81 mg Oral Daily    hydrALAZINE  10 mg Oral 3 times per day    isosorbide mononitrate  30 mg Oral Daily    pantoprazole  40 mg Oral QAM AC    senna  1 tablet Oral BID    sodium chloride flush  5-40 mL IntraVENous 2 times per day    insulin glargine  60 Units SubCUTAneous BID    insulin lispro  0-18 Units SubCUTAneous TID WC    insulin lispro  0-9 Units SubCUTAneous Nightly      dextrose      sodium chloride         Lab Data:  CBC:   Recent Labs     03/14/22  0713   WBC 6.4   HGB 10.3*        BMP:    Recent Labs     03/12/22  0623 03/14/22  0713    141   K 3.9 3.8   CO2 30 32   BUN 47* 49*   CREATININE 1.3 1.5*     INR:  No results for input(s): INR in the last 72 hours. BNP:    Recent Labs     03/13/22  1156   PROBNP 857*     Lab Results   Component Value Date    LVEF 58 08/25/2021     Testing:  Echo 1/2022  Limited only f/u for LVEF and aortic valve stenosis. Technically difficult examination. Low normal left ventricular systolic function with ejection fraction of 50%. Moderate aortic stenosis. Aortic stenosis values appear approximately same when compared to echo on 8-. Mild to moderate aortic regurgitation. Stress test 3/10/22      Summary    Abnormal moderate risk myocardial perfusion study.   Jillene Bergamo is a large area of mixed ischemia and scar within the infero-apical,    apical-lateral, lateral, posterior-lateral, and posterior-basal segments.    The left ventricular size is moderately dilated.    The estimated left ventricular function is 67%.      03/14/22 04:26:12 282 lb 9.6 oz (128.2 kg) Actual;Standing scale     03/13/22 0430 283 lb 1.6 oz (128.4 kg) Actual;Standing scale     03/12/22 0500 281 lb 14.4 oz (127.9 kg) Actual;Standing scale     03/11/22 03:41:03 281 lb 14.4 oz (127.9 kg) Standing scale     03/10/22 0507 281 lb 4.8 oz (127.6 kg) Actual;Standing scale     03/09/22 0350 286 lb 9.6 oz (130 kg) Standing scale     03/08/22 03:44:58 291 lb (132 kg) Actual;Standing scale     03/07/22 2008 293 lb 6.4 oz (133.1 kg) Standing scale     03/07/22 1342 283 lb (128.4 kg) Stated       Principal Problem:    Acute on chronic diastolic CHF (congestive heart failure) (HCC)  Active Problems:    DM (diabetes mellitus) ([facility]    Essential hypertension    Morbid obesity ([facility]  Resolved Problems:    * No resolved hospital problems.

## 2022-03-14 NOTE — DISCHARGE INSTR - ACTIVITY
As tolerated unless otherwise directed by a physician. 2. The status of comorbities. (See ED/admit documents)

## 2022-03-14 NOTE — PROGRESS NOTES
Pt d/c'd home. Removed peripheral IV and stopped bleeding. Catheter intact. Pt tolerated well. No redness noted at site. Notified [facility] and removed tele box. Reviewed d/c instructions, home meds, and  f/u information utilizing teach-back method.

## 2022-03-17 ENCOUNTER — FOLLOWUP TELEPHONE ENCOUNTER (OUTPATIENT)
Dept: TELEMETRY | Age: 67
End: 2022-03-17

## 2022-03-17 NOTE — TELEPHONE ENCOUNTER
Spoke to patient for hospital follow up. Pt states he is doing well. Medical house calls PCP is to visit pt today. Denies any current needs.   Rock Seferino RN

## 2022-03-21 ENCOUNTER — TELEPHONE (OUTPATIENT)
Dept: CARDIOLOGY CLINIC | Age: 67
End: 2022-03-21

## 2022-03-21 NOTE — TELEPHONE ENCOUNTER
Pt called and reported extreme SOB with very little exertion. Pt stated \" sometimes it is so bad I feel like I am going to  due to lack of oxygen\". I advised if SOB was that bad to go to ER. Pt stated he wanted to talk to [employer] [address] first. [employer] [address] came and seen pt while in the hospital on . Pt wants to know if his diuretic should be adjusted?

## 2022-03-21 NOTE — TELEPHONE ENCOUNTER
Spoke to patient, having SOB, edema. Hasn't been able to weigh himself, ordered a new scale. When sitting, he's ok. But if he walks at all, gets very SOB. currently on O2 - 3.5L- continuous. Taking Metolazone 5mg once a week (thurs)    Spoke to Dr. Vanessa Kulkarni on Saturday, he told him to take Metolazone one tab on Saturday and one tab today (Monday). Patient states he feels like it I helping. on Saturday he was in bad shape he said, and that was with his oxygen on. Spironolactone 25mg QD    Torsemide (100mg tab)  Taking 50mg BID    Patient does feel like it helped taking the extra metolazone Saturday and today. He wants to know how he should continue with it now. Please advise.

## 2022-03-22 NOTE — TELEPHONE ENCOUNTER
Please have him continue torsemide 50mg twice a day  Adjust the metolazone to three times a week this week and then go to twice a week next week. Needs a follow up appt.

## 2022-03-23 NOTE — TELEPHONE ENCOUNTER
Called and spoke with pt. Relayed NP RB message regarding torsemide and metolazone and helped pt get scheduled for follow up appt.     Pt scheduled for 4/25 @ 2:30 PM OV here at [place] [facility] with SYLVIE Lagos

## 2022-03-31 ENCOUNTER — HOSPITAL ENCOUNTER (INPATIENT)
Age: 67
LOS: 9 days | Discharge: HOME HEALTH | DRG: 291 | End: 2022-04-09
Attending: EMERGENCY MEDICINE | Admitting: INTERNAL MEDICINE
Payer: MEDICARE

## 2022-03-31 ENCOUNTER — APPOINTMENT (OUTPATIENT)
Dept: GENERAL RADIOLOGY | Age: 67
DRG: 291 | End: 2022-03-31
Payer: MEDICARE

## 2022-03-31 ENCOUNTER — TELEPHONE (OUTPATIENT)
Dept: OTHER | Facility: CLINIC | Age: 67
End: 2022-03-31

## 2022-03-31 DIAGNOSIS — I50.9 ACUTE ON CHRONIC CONGESTIVE HEART FAILURE, UNSPECIFIED HEART FAILURE TYPE (HCC): Primary | ICD-10-CM

## 2022-03-31 DIAGNOSIS — I48.0 PAF (PAROXYSMAL ATRIAL FIBRILLATION) (HCC): ICD-10-CM

## 2022-03-31 DIAGNOSIS — N28.9 ACUTE ON CHRONIC RENAL INSUFFICIENCY: ICD-10-CM

## 2022-03-31 DIAGNOSIS — J96.01 ACUTE RESPIRATORY FAILURE WITH HYPOXIA (HCC): ICD-10-CM

## 2022-03-31 DIAGNOSIS — N18.9 ACUTE ON CHRONIC RENAL INSUFFICIENCY: ICD-10-CM

## 2022-03-31 DIAGNOSIS — I48.0 PAROXYSMAL ATRIAL FIBRILLATION (HCC): ICD-10-CM

## 2022-03-31 LAB
A/G RATIO: 1.4 (ref 1.1–2.2)
ALBUMIN SERPL-MCNC: 4 G/DL (ref 3.4–5)
ALP BLD-CCNC: 92 U/L (ref 40–129)
ALT SERPL-CCNC: 9 U/L (ref 10–40)
ANION GAP SERPL CALCULATED.3IONS-SCNC: 10 MMOL/L (ref 3–16)
APTT: 39.2 SEC (ref 26.2–38.6)
AST SERPL-CCNC: 15 U/L (ref 15–37)
BASE EXCESS VENOUS: 14 MMOL/L (ref -3–3)
BASOPHILS ABSOLUTE: 0 K/UL (ref 0–0.2)
BASOPHILS RELATIVE PERCENT: 0.4 %
BILIRUB SERPL-MCNC: 0.4 MG/DL (ref 0–1)
BILIRUBIN URINE: NEGATIVE
BLOOD, URINE: NEGATIVE
BUN BLDV-MCNC: 100 MG/DL (ref 7–20)
CALCIUM SERPL-MCNC: 9 MG/DL (ref 8.3–10.6)
CARBOXYHEMOGLOBIN: 1.6 % (ref 0–1.5)
CHLORIDE BLD-SCNC: 86 MMOL/L (ref 99–110)
CLARITY: CLEAR
CO2: 39 MMOL/L (ref 21–32)
COLOR: YELLOW
CREAT SERPL-MCNC: 1.8 MG/DL (ref 0.8–1.3)
EOSINOPHILS ABSOLUTE: 0.1 K/UL (ref 0–0.6)
EOSINOPHILS RELATIVE PERCENT: 1.9 %
GFR AFRICAN AMERICAN: 46
GFR NON-AFRICAN AMERICAN: 38
GLUCOSE BLD-MCNC: 259 MG/DL (ref 70–99)
GLUCOSE URINE: NEGATIVE MG/DL
HCO3 VENOUS: 41.2 MMOL/L (ref 23–29)
HCT VFR BLD CALC: 31 % (ref 40.5–52.5)
HEMOGLOBIN: 10 G/DL (ref 13.5–17.5)
INR BLD: 1.29 (ref 0.88–1.12)
KETONES, URINE: NEGATIVE MG/DL
LEUKOCYTE ESTERASE, URINE: NEGATIVE
LYMPHOCYTES ABSOLUTE: 0.5 K/UL (ref 1–5.1)
LYMPHOCYTES RELATIVE PERCENT: 6.9 %
MAGNESIUM: 2.6 MG/DL (ref 1.8–2.4)
MCH RBC QN AUTO: 28.5 PG (ref 26–34)
MCHC RBC AUTO-ENTMCNC: 32.3 G/DL (ref 31–36)
MCV RBC AUTO: 88.2 FL (ref 80–100)
METHEMOGLOBIN VENOUS: 0.4 %
MICROSCOPIC EXAMINATION: NORMAL
MONOCYTES ABSOLUTE: 0.5 K/UL (ref 0–1.3)
MONOCYTES RELATIVE PERCENT: 6 %
NEUTROPHILS ABSOLUTE: 6.5 K/UL (ref 1.7–7.7)
NEUTROPHILS RELATIVE PERCENT: 84.8 %
NITRITE, URINE: NEGATIVE
O2 SAT, VEN: 82 %
O2 THERAPY: ABNORMAL
PCO2, VEN: 66.3 MMHG (ref 40–50)
PDW BLD-RTO: 19.7 % (ref 12.4–15.4)
PH UA: 8 (ref 5–8)
PH VENOUS: 7.41 (ref 7.35–7.45)
PLATELET # BLD: 155 K/UL (ref 135–450)
PMV BLD AUTO: 8.8 FL (ref 5–10.5)
PO2, VEN: 50 MMHG (ref 25–40)
POTASSIUM REFLEX MAGNESIUM: 3.4 MMOL/L (ref 3.5–5.1)
PRO-BNP: 2358 PG/ML (ref 0–124)
PROTEIN UA: NEGATIVE MG/DL
PROTHROMBIN TIME: 14.7 SEC (ref 9.9–12.7)
RBC # BLD: 3.52 M/UL (ref 4.2–5.9)
SODIUM BLD-SCNC: 135 MMOL/L (ref 136–145)
SPECIFIC GRAVITY UA: 1.01 (ref 1–1.03)
TCO2 CALC VENOUS: 43 MMOL/L
TOTAL PROTEIN: 6.8 G/DL (ref 6.4–8.2)
TROPONIN: 0.05 NG/ML
URINE REFLEX TO CULTURE: NORMAL
URINE TYPE: NORMAL
UROBILINOGEN, URINE: 0.2 E.U./DL
WBC # BLD: 7.6 K/UL (ref 4–11)

## 2022-03-31 PROCEDURE — 6360000002 HC RX W HCPCS: Performed by: EMERGENCY MEDICINE

## 2022-03-31 PROCEDURE — 83880 ASSAY OF NATRIURETIC PEPTIDE: CPT

## 2022-03-31 PROCEDURE — 85610 PROTHROMBIN TIME: CPT

## 2022-03-31 PROCEDURE — 85025 COMPLETE CBC W/AUTO DIFF WBC: CPT

## 2022-03-31 PROCEDURE — 84484 ASSAY OF TROPONIN QUANT: CPT

## 2022-03-31 PROCEDURE — 83735 ASSAY OF MAGNESIUM: CPT

## 2022-03-31 PROCEDURE — 2700000000 HC OXYGEN THERAPY PER DAY

## 2022-03-31 PROCEDURE — 80053 COMPREHEN METABOLIC PANEL: CPT

## 2022-03-31 PROCEDURE — 6370000000 HC RX 637 (ALT 250 FOR IP): Performed by: EMERGENCY MEDICINE

## 2022-03-31 PROCEDURE — 82803 BLOOD GASES ANY COMBINATION: CPT

## 2022-03-31 PROCEDURE — 99285 EMERGENCY DEPT VISIT HI MDM: CPT

## 2022-03-31 PROCEDURE — 93005 ELECTROCARDIOGRAM TRACING: CPT | Performed by: EMERGENCY MEDICINE

## 2022-03-31 PROCEDURE — 94761 N-INVAS EAR/PLS OXIMETRY MLT: CPT

## 2022-03-31 PROCEDURE — 81003 URINALYSIS AUTO W/O SCOPE: CPT

## 2022-03-31 PROCEDURE — 96374 THER/PROPH/DIAG INJ IV PUSH: CPT

## 2022-03-31 PROCEDURE — 71045 X-RAY EXAM CHEST 1 VIEW: CPT

## 2022-03-31 PROCEDURE — 1200000000 HC SEMI PRIVATE

## 2022-03-31 PROCEDURE — 85730 THROMBOPLASTIN TIME PARTIAL: CPT

## 2022-03-31 RX ORDER — FUROSEMIDE 10 MG/ML
40 INJECTION INTRAMUSCULAR; INTRAVENOUS ONCE
Status: COMPLETED | OUTPATIENT
Start: 2022-03-31 | End: 2022-03-31

## 2022-03-31 RX ADMIN — NITROGLYCERIN 0.5 INCH: 20 OINTMENT TOPICAL at 22:54

## 2022-03-31 RX ADMIN — FUROSEMIDE 40 MG: 10 INJECTION, SOLUTION INTRAMUSCULAR; INTRAVENOUS at 22:54

## 2022-03-31 ASSESSMENT — PAIN SCALES - GENERAL: PAINLEVEL_OUTOF10: 8

## 2022-03-31 ASSESSMENT — PAIN DESCRIPTION - PAIN TYPE: TYPE: ACUTE PAIN

## 2022-03-31 ASSESSMENT — PAIN DESCRIPTION - ORIENTATION: ORIENTATION: MID

## 2022-03-31 ASSESSMENT — PAIN DESCRIPTION - LOCATION: LOCATION: CHEST

## 2022-04-01 LAB
ANION GAP SERPL CALCULATED.3IONS-SCNC: 10 MMOL/L (ref 3–16)
BASOPHILS ABSOLUTE: 0 K/UL (ref 0–0.2)
BASOPHILS RELATIVE PERCENT: 0.4 %
BUN BLDV-MCNC: 96 MG/DL (ref 7–20)
CALCIUM SERPL-MCNC: 9.5 MG/DL (ref 8.3–10.6)
CHLORIDE BLD-SCNC: 89 MMOL/L (ref 99–110)
CHOLESTEROL, TOTAL: 113 MG/DL (ref 0–199)
CO2: 40 MMOL/L (ref 21–32)
CREAT SERPL-MCNC: 1.8 MG/DL (ref 0.8–1.3)
EKG ATRIAL RATE: 57 BPM
EKG DIAGNOSIS: NORMAL
EKG P AXIS: 28 DEGREES
EKG P-R INTERVAL: 190 MS
EKG Q-T INTERVAL: 436 MS
EKG QRS DURATION: 92 MS
EKG QTC CALCULATION (BAZETT): 424 MS
EKG R AXIS: -28 DEGREES
EKG T AXIS: 111 DEGREES
EKG VENTRICULAR RATE: 57 BPM
EOSINOPHILS ABSOLUTE: 0.2 K/UL (ref 0–0.6)
EOSINOPHILS RELATIVE PERCENT: 2.8 %
ESTIMATED AVERAGE GLUCOSE: 154.2 MG/DL
GFR AFRICAN AMERICAN: 46
GFR NON-AFRICAN AMERICAN: 38
GLUCOSE BLD-MCNC: 143 MG/DL (ref 70–99)
GLUCOSE BLD-MCNC: 145 MG/DL (ref 70–99)
GLUCOSE BLD-MCNC: 173 MG/DL (ref 70–99)
GLUCOSE BLD-MCNC: 186 MG/DL (ref 70–99)
GLUCOSE BLD-MCNC: 229 MG/DL (ref 70–99)
GLUCOSE BLD-MCNC: 274 MG/DL (ref 70–99)
HBA1C MFR BLD: 7 %
HCT VFR BLD CALC: 31.9 % (ref 40.5–52.5)
HDLC SERPL-MCNC: 30 MG/DL (ref 40–60)
HEMOGLOBIN: 10.3 G/DL (ref 13.5–17.5)
IRON SATURATION: 16 % (ref 20–50)
IRON: 42 UG/DL (ref 59–158)
LDL CHOLESTEROL CALCULATED: 65 MG/DL
LYMPHOCYTES ABSOLUTE: 0.6 K/UL (ref 1–5.1)
LYMPHOCYTES RELATIVE PERCENT: 8.1 %
MAGNESIUM: 2.9 MG/DL (ref 1.8–2.4)
MCH RBC QN AUTO: 28.2 PG (ref 26–34)
MCHC RBC AUTO-ENTMCNC: 32.4 G/DL (ref 31–36)
MCV RBC AUTO: 87.1 FL (ref 80–100)
MONOCYTES ABSOLUTE: 0.6 K/UL (ref 0–1.3)
MONOCYTES RELATIVE PERCENT: 7.5 %
NEUTROPHILS ABSOLUTE: 6.2 K/UL (ref 1.7–7.7)
NEUTROPHILS RELATIVE PERCENT: 81.2 %
PDW BLD-RTO: 19.2 % (ref 12.4–15.4)
PERFORMED ON: ABNORMAL
PLATELET # BLD: 152 K/UL (ref 135–450)
PMV BLD AUTO: 9 FL (ref 5–10.5)
POTASSIUM SERPL-SCNC: 3.5 MMOL/L (ref 3.5–5.1)
RBC # BLD: 3.66 M/UL (ref 4.2–5.9)
SODIUM BLD-SCNC: 139 MMOL/L (ref 136–145)
TOTAL IRON BINDING CAPACITY: 268 UG/DL (ref 260–445)
TRIGL SERPL-MCNC: 92 MG/DL (ref 0–150)
TROPONIN: 0.04 NG/ML
TROPONIN: 0.05 NG/ML
VLDLC SERPL CALC-MCNC: 18 MG/DL
WBC # BLD: 7.7 K/UL (ref 4–11)

## 2022-04-01 PROCEDURE — 99222 1ST HOSP IP/OBS MODERATE 55: CPT | Performed by: INTERNAL MEDICINE

## 2022-04-01 PROCEDURE — 80061 LIPID PANEL: CPT

## 2022-04-01 PROCEDURE — 2580000003 HC RX 258: Performed by: INTERNAL MEDICINE

## 2022-04-01 PROCEDURE — 6370000000 HC RX 637 (ALT 250 FOR IP): Performed by: NURSE PRACTITIONER

## 2022-04-01 PROCEDURE — 80048 BASIC METABOLIC PNL TOTAL CA: CPT

## 2022-04-01 PROCEDURE — 2500000003 HC RX 250 WO HCPCS: Performed by: NURSE PRACTITIONER

## 2022-04-01 PROCEDURE — 6360000002 HC RX W HCPCS: Performed by: NURSE PRACTITIONER

## 2022-04-01 PROCEDURE — 83036 HEMOGLOBIN GLYCOSYLATED A1C: CPT

## 2022-04-01 PROCEDURE — 83540 ASSAY OF IRON: CPT

## 2022-04-01 PROCEDURE — 84484 ASSAY OF TROPONIN QUANT: CPT

## 2022-04-01 PROCEDURE — 83735 ASSAY OF MAGNESIUM: CPT

## 2022-04-01 PROCEDURE — 85025 COMPLETE CBC W/AUTO DIFF WBC: CPT

## 2022-04-01 PROCEDURE — 83550 IRON BINDING TEST: CPT

## 2022-04-01 PROCEDURE — 6360000002 HC RX W HCPCS: Performed by: INTERNAL MEDICINE

## 2022-04-01 PROCEDURE — 1200000000 HC SEMI PRIVATE

## 2022-04-01 PROCEDURE — 2580000003 HC RX 258: Performed by: NURSE PRACTITIONER

## 2022-04-01 PROCEDURE — 93010 ELECTROCARDIOGRAM REPORT: CPT | Performed by: INTERNAL MEDICINE

## 2022-04-01 PROCEDURE — 36415 COLL VENOUS BLD VENIPUNCTURE: CPT

## 2022-04-01 PROCEDURE — 2700000000 HC OXYGEN THERAPY PER DAY

## 2022-04-01 PROCEDURE — 94761 N-INVAS EAR/PLS OXIMETRY MLT: CPT

## 2022-04-01 RX ORDER — ACETAMINOPHEN 325 MG/1
650 TABLET ORAL EVERY 6 HOURS PRN
Status: DISCONTINUED | OUTPATIENT
Start: 2022-04-01 | End: 2022-04-09 | Stop reason: HOSPADM

## 2022-04-01 RX ORDER — CARVEDILOL 6.25 MG/1
6.25 TABLET ORAL 2 TIMES DAILY WITH MEALS
Status: DISCONTINUED | OUTPATIENT
Start: 2022-04-01 | End: 2022-04-04

## 2022-04-01 RX ORDER — FUROSEMIDE 10 MG/ML
40 INJECTION INTRAMUSCULAR; INTRAVENOUS 2 TIMES DAILY
Status: DISCONTINUED | OUTPATIENT
Start: 2022-04-01 | End: 2022-04-01

## 2022-04-01 RX ORDER — FUROSEMIDE 10 MG/ML
40 INJECTION INTRAMUSCULAR; INTRAVENOUS ONCE
Status: COMPLETED | OUTPATIENT
Start: 2022-04-01 | End: 2022-04-01

## 2022-04-01 RX ORDER — NICOTINE POLACRILEX 4 MG
15 LOZENGE BUCCAL PRN
Status: DISCONTINUED | OUTPATIENT
Start: 2022-04-01 | End: 2022-04-09 | Stop reason: HOSPADM

## 2022-04-01 RX ORDER — OXYCODONE HYDROCHLORIDE AND ACETAMINOPHEN 5; 325 MG/1; MG/1
1 TABLET ORAL 4 TIMES DAILY
Status: DISCONTINUED | OUTPATIENT
Start: 2022-04-01 | End: 2022-04-06

## 2022-04-01 RX ORDER — POLYETHYLENE GLYCOL 3350 17 G/17G
17 POWDER, FOR SOLUTION ORAL DAILY PRN
Status: DISCONTINUED | OUTPATIENT
Start: 2022-04-01 | End: 2022-04-06

## 2022-04-01 RX ORDER — SODIUM CHLORIDE 9 MG/ML
INJECTION, SOLUTION INTRAVENOUS PRN
Status: DISCONTINUED | OUTPATIENT
Start: 2022-04-01 | End: 2022-04-09 | Stop reason: HOSPADM

## 2022-04-01 RX ORDER — ONDANSETRON 4 MG/1
4 TABLET, ORALLY DISINTEGRATING ORAL EVERY 8 HOURS PRN
Status: DISCONTINUED | OUTPATIENT
Start: 2022-04-01 | End: 2022-04-09 | Stop reason: HOSPADM

## 2022-04-01 RX ORDER — DEXTROSE MONOHYDRATE 25 G/50ML
12.5 INJECTION, SOLUTION INTRAVENOUS PRN
Status: DISCONTINUED | OUTPATIENT
Start: 2022-04-01 | End: 2022-04-01

## 2022-04-01 RX ORDER — INSULIN GLARGINE 100 [IU]/ML
80 INJECTION, SOLUTION SUBCUTANEOUS 2 TIMES DAILY
Status: DISCONTINUED | OUTPATIENT
Start: 2022-04-01 | End: 2022-04-04

## 2022-04-01 RX ORDER — PREGABALIN 25 MG/1
25 CAPSULE ORAL 2 TIMES DAILY
Status: DISCONTINUED | OUTPATIENT
Start: 2022-04-01 | End: 2022-04-09 | Stop reason: HOSPADM

## 2022-04-01 RX ORDER — SODIUM CHLORIDE 0.9 % (FLUSH) 0.9 %
5-40 SYRINGE (ML) INJECTION PRN
Status: DISCONTINUED | OUTPATIENT
Start: 2022-04-01 | End: 2022-04-09 | Stop reason: HOSPADM

## 2022-04-01 RX ORDER — ONDANSETRON 2 MG/ML
4 INJECTION INTRAMUSCULAR; INTRAVENOUS EVERY 6 HOURS PRN
Status: DISCONTINUED | OUTPATIENT
Start: 2022-04-01 | End: 2022-04-09 | Stop reason: HOSPADM

## 2022-04-01 RX ORDER — ISOSORBIDE MONONITRATE 30 MG/1
30 TABLET, EXTENDED RELEASE ORAL DAILY
Status: DISCONTINUED | OUTPATIENT
Start: 2022-04-01 | End: 2022-04-05

## 2022-04-01 RX ORDER — DEXTROSE MONOHYDRATE 50 MG/ML
100 INJECTION, SOLUTION INTRAVENOUS PRN
Status: DISCONTINUED | OUTPATIENT
Start: 2022-04-01 | End: 2022-04-09 | Stop reason: HOSPADM

## 2022-04-01 RX ORDER — ATORVASTATIN CALCIUM 80 MG/1
80 TABLET, FILM COATED ORAL NIGHTLY
Status: DISCONTINUED | OUTPATIENT
Start: 2022-04-01 | End: 2022-04-09 | Stop reason: HOSPADM

## 2022-04-01 RX ORDER — ACETAMINOPHEN 650 MG/1
650 SUPPOSITORY RECTAL EVERY 6 HOURS PRN
Status: DISCONTINUED | OUTPATIENT
Start: 2022-04-01 | End: 2022-04-09 | Stop reason: HOSPADM

## 2022-04-01 RX ORDER — FERROUS SULFATE 325(65) MG
325 TABLET ORAL
Status: DISCONTINUED | OUTPATIENT
Start: 2022-04-01 | End: 2022-04-04

## 2022-04-01 RX ORDER — ASPIRIN 81 MG/1
81 TABLET, CHEWABLE ORAL DAILY
Status: DISCONTINUED | OUTPATIENT
Start: 2022-04-01 | End: 2022-04-09 | Stop reason: HOSPADM

## 2022-04-01 RX ORDER — SODIUM CHLORIDE 0.9 % (FLUSH) 0.9 %
5-40 SYRINGE (ML) INJECTION EVERY 12 HOURS SCHEDULED
Status: DISCONTINUED | OUTPATIENT
Start: 2022-04-01 | End: 2022-04-09 | Stop reason: HOSPADM

## 2022-04-01 RX ADMIN — FERROUS SULFATE TAB 325 MG (65 MG ELEMENTAL FE) 325 MG: 325 (65 FE) TAB at 08:29

## 2022-04-01 RX ADMIN — OXYCODONE AND ACETAMINOPHEN 1 TABLET: 325; 5 TABLET ORAL at 16:54

## 2022-04-01 RX ADMIN — PREGABALIN 25 MG: 25 CAPSULE ORAL at 22:00

## 2022-04-01 RX ADMIN — PREGABALIN 25 MG: 25 CAPSULE ORAL at 08:29

## 2022-04-01 RX ADMIN — ENOXAPARIN SODIUM 30 MG: 100 INJECTION SUBCUTANEOUS at 22:00

## 2022-04-01 RX ADMIN — OXYCODONE AND ACETAMINOPHEN 1 TABLET: 325; 5 TABLET ORAL at 12:03

## 2022-04-01 RX ADMIN — INSULIN GLARGINE 80 UNITS: 100 INJECTION, SOLUTION SUBCUTANEOUS at 08:31

## 2022-04-01 RX ADMIN — OXYCODONE AND ACETAMINOPHEN 1 TABLET: 325; 5 TABLET ORAL at 08:29

## 2022-04-01 RX ADMIN — FUROSEMIDE 5 MG/HR: 10 INJECTION, SOLUTION INTRAMUSCULAR; INTRAVENOUS at 23:56

## 2022-04-01 RX ADMIN — ATORVASTATIN CALCIUM 80 MG: 80 TABLET, FILM COATED ORAL at 22:00

## 2022-04-01 RX ADMIN — PREGABALIN 25 MG: 25 CAPSULE ORAL at 01:51

## 2022-04-01 RX ADMIN — INSULIN LISPRO 3 UNITS: 100 INJECTION, SOLUTION INTRAVENOUS; SUBCUTANEOUS at 17:25

## 2022-04-01 RX ADMIN — FUROSEMIDE 40 MG: 10 INJECTION, SOLUTION INTRAMUSCULAR; INTRAVENOUS at 08:28

## 2022-04-01 RX ADMIN — FERROUS SULFATE TAB 325 MG (65 MG ELEMENTAL FE) 325 MG: 325 (65 FE) TAB at 16:54

## 2022-04-01 RX ADMIN — ENOXAPARIN SODIUM 30 MG: 100 INJECTION SUBCUTANEOUS at 08:28

## 2022-04-01 RX ADMIN — FUROSEMIDE 40 MG: 10 INJECTION, SOLUTION INTRAMUSCULAR; INTRAVENOUS at 16:54

## 2022-04-01 RX ADMIN — FERROUS SULFATE TAB 325 MG (65 MG ELEMENTAL FE) 325 MG: 325 (65 FE) TAB at 12:03

## 2022-04-01 RX ADMIN — INSULIN GLARGINE 80 UNITS: 100 INJECTION, SOLUTION SUBCUTANEOUS at 22:02

## 2022-04-01 RX ADMIN — Medication 10 ML: at 22:06

## 2022-04-01 RX ADMIN — INSULIN LISPRO 3 UNITS: 100 INJECTION, SOLUTION INTRAVENOUS; SUBCUTANEOUS at 08:36

## 2022-04-01 RX ADMIN — CARVEDILOL 6.25 MG: 6.25 TABLET, FILM COATED ORAL at 16:54

## 2022-04-01 RX ADMIN — ASPIRIN 81 MG 81 MG: 81 TABLET ORAL at 08:29

## 2022-04-01 RX ADMIN — FUROSEMIDE 5 MG/HR: 10 INJECTION, SOLUTION INTRAMUSCULAR; INTRAVENOUS at 17:17

## 2022-04-01 RX ADMIN — Medication 10 ML: at 08:30

## 2022-04-01 RX ADMIN — INSULIN LISPRO 3 UNITS: 100 INJECTION, SOLUTION INTRAVENOUS; SUBCUTANEOUS at 12:05

## 2022-04-01 RX ADMIN — SILVER SULFADIAZINE: 10 CREAM TOPICAL at 11:16

## 2022-04-01 RX ADMIN — INSULIN LISPRO 5 UNITS: 100 INJECTION, SOLUTION INTRAVENOUS; SUBCUTANEOUS at 22:01

## 2022-04-01 RX ADMIN — OXYCODONE AND ACETAMINOPHEN 1 TABLET: 325; 5 TABLET ORAL at 22:00

## 2022-04-01 ASSESSMENT — ENCOUNTER SYMPTOMS
SHORTNESS OF BREATH: 1
NAUSEA: 0
ABDOMINAL PAIN: 0
VOMITING: 0
COUGH: 0
BACK PAIN: 1

## 2022-04-01 ASSESSMENT — PAIN SCALES - GENERAL
PAINLEVEL_OUTOF10: 5
PAINLEVEL_OUTOF10: 0
PAINLEVEL_OUTOF10: 0
PAINLEVEL_OUTOF10: 8
PAINLEVEL_OUTOF10: 8

## 2022-04-01 NOTE — ED NOTES
Ambulated pt on 4 L pt O2 sat dropped to 88 and HR of 78bpm  At rest pt O2 was 94 and HR was 67 bpm gait was steady     Dale Cedeno  03/31/22 2209

## 2022-04-01 NOTE — H&P
Hospital Medicine History & Physical      PCP: PATRICK Ghotra - CNP    Date of Admission: 3/31/2022    Date of Service: Pt seen/examined on 3/31/2022 and Admitted to Inpatient with expected LOS greater than two midnights due to medical therapy. Chief Complaint:  Shortness of breath    History Of Present Illness:      75 y.o. male, with PMH of CHF, COPD, morbid obesity, HTN, CAD, HLD, CKD and DM, who presented to [facility] with shortness of breath. History obtained from the patient and review of EMR. The patient stated he has been experiencing shortness of breath for the last few days. He stated he wears 4.5 L NC supplemental oxygen at home, but has had to increase it occasionally. The patient stated his oxygen has been dipping down to 84% at rest and 74% on exertion. He stated he  was admitted here at [facility] 3/7-3/14/2022 for CHF exacerbation. He stated when he was admitted he was 296 pounds and at discharge he was 282 pounds. Per EMR the patient was on a lasix drip throughout his admission. However, the patient stated he did well for a few days after his discharge, but then he started to feel like he was \"collecting\" fluid again and experiencing shortness of breath. He stated he called Melissa Jack NP and was told to increase his diuretics, so he did. However, the patient stated he continued to be short of breath. The patient stated when he was discharged his weight was 282 pounds and when he got on the scale today it was 307 despite taking his diuretics so he called EMS to bring him to ED for further evaluation. When EMS arrived the patient was placed on non rebreather mask and given a duoneb treatment en route. On arrival to ED he was weaned down to [facility] and eventually back to his baseline of 4.5 L. In the ED a CXR was obtained that revealed nonspecific pulmonary opacities in bilateral lungs can be seen with CHF and his proBNP was 2,358, which is increased from 857 on 3/13/2022.  He was given 40 mg IV lasix. The patient will be admitted for further evaluation and treatment. Cardiology and CHF nurse have been consulted for the am.The patient denied any other associated symptoms as well as any aggravating and/or alleviating factors. At the time of this assessment, the patient was resting comfortably in bed. He currently denies any chest pain, back pain, abdominal pain, shortness of breath, numbness, tingling, N/V/C/D, fever and/or chills.      Past Medical History:          Diagnosis Date    Anemia     Angina     Arthritis     CAD (coronary artery disease)     [facility]congest[facility] failure[facility]     CKD (chronic kidney disease) stage 3, GFR 30-59 ml/min ([facility])     Clostridium difficile diarrhea 3/16/12; 2/29/12    positive stool toxin    Diabetes mellitus ([address]     Diabetic neuropathy ([address]     feet and legs    Disease of blood and blood forming organ     Dizziness     When he moves his head/ loses his balance    GERD (gastroesophageal reflux disease)     gastric ulcer    Headache     Hearing loss     Hyperlipidemia     Hypertension     Kidney stone 2002    Refusal of blood transfusions as patient is [employer]     Sleep apnea     Tinnitus     Venous ulcer ([address]     LLE    Wound, open 1/13/2012     Past Surgical History:          Procedure Laterality Date    CARDIAC SURGERY      Dec 27 2010, triple bypass    CHOLECYSTECTOMY  9/2011    HERNIA REPAIR  age2    OTHER SURGICAL HISTORY  11-16-11 REPAIR LOWER STERNAL INCISION, REMOVAL OF ONE STERNAL WIRE,    OTHER SURGICAL HISTORY  10/10/2014    phacoemulsification of cataract with intraocular lens implant left eye    PAIN MANAGEMENT PROCEDURE N/A 2/10/2022    MIDLINE CERVICAL SIX SEVEN EPIDURAL STEROID INJECTION SITE CONFIRMED BY FLUOROSCOPY performed by Vibha Cifuentes MD at [address] [place] [address],[address] ENDOSCOPY       Medications Prior to Admission:      Prior to Admission medications    Medication Sig Start Date End Date Taking? Authorizing Provider   oxyCODONE-acetaminophen (PERCOCET) 5-325 MG per tablet Take 1 tablet by mouth 4 times daily for 30 days. 3/28/22 4/27/22  Florin Alvarado MD   oxyCODONE-acetaminophen (PERCOCET) 5-325 MG per tablet Take 1 tablet by mouth 4 times daily for 30 days. 4/26/22 5/26/22  Florin Alvarado MD   atorvastatin (LIPITOR) 80 MG tablet Take 1 tablet by mouth nightly 3/14/22   PATRICK Resendiz CNP   carvedilol (COREG) 6.25 MG tablet Take 1 tablet by mouth 2 times daily (with meals) 3/14/22   PATRICK Resendiz CNP   metOLazone (ZAROXOLYN) 5 MG tablet Once a week on thursdays 3/14/22   PATRICK Resendiz CNP   torsemide [facility]) 100 MG tablet Take 0.5 tablets by mouth 2 times daily 3/14/22   PATRICK Resendiz CNP   pregabalin (LYRICA) 25 MG capsule Take 1 capsule by mouth 2 times daily for 1 day. 3/14/22 3/15/22  Faizan Borges MD   allopurinol (ZYLOPRIM) 300 MG tablet Take 1 tablet by mouth daily 3/15/22   Faizan Borges MD   aspirin-acetaminophen-caffeine [facility] MIGRAINE) 342-436-00 MG per tablet Take 1 tablet by mouth every 6 hours as needed for Headaches    Historical Provider, MD   naloxone 4 MG/0.1ML LIQD nasal spray 1 spray by Nasal route as needed for Opioid Reversal 11/23/21   Florin Alvarado MD   spironolactone (ALDACTONE) 25 MG tablet Take 1 tablet by mouth daily 9/1/21   PATRICK Resendiz CNP   linaCLOtide (LINZESS PO) Take by mouth    Historical Provider, MD   acetaminophen (TYLENOL) 500 MG tablet Take 2 tablets by mouth 4 times daily as needed for Pain 9/17/20   PATRICK Rojas CNP   hydrocortisone (ANUSOL-HC) 2.5 % rectal cream Place rectally 2 times daily.  8/7/19   StephaniePATRICK Kidd CNP   senna (SENOKOT) 8.6 MG TABS tablet Take 1 tablet by mouth 2 times daily 7/8/19   PATRICK Medeiros - CNP   docusate sodium (COLACE, DULCOLAX) 100 MG CAPS Take 100 mg by mouth 2 times daily 5/1/18   PATRICK Faust - CNP   insulin aspart (NOVOLOG) 100 UNIT/ML injection vial Inject into the skin 3 times daily (before meals) 50 units with breakfast, 20 units with lunch and 60 units with dinner    Historical Provider, MD   insulin glargine (LANTUS) 100 UNIT/ML injection vial Inject 55 Units into the skin 2 times daily  Patient taking differently: Inject 80 Units into the skin 2 times daily  1/15/17   Ronnell Jameson MD   Compression Stockings MISC by Does not apply route 2 pair, knee high compression stockings, fitted/ zippered 6/15/16   Beau Purpura, APRN - CNP   silver sulfADIAZINE (SILVADENE) 1 % cream Apply to BL lower leg ulcers daily 2/9/16   Lenka Greer MD   nitroGLYCERIN (NITROSTAT) 0.4 MG SL tablet Place 1 tablet under the tongue every 5 minutes as needed 8/17/15   Beau Purpura, APRN - CNP   fluticasone (FLONASE) 50 MCG/ACT nasal spray 1 spray by Nasal route nightly as needed. Historical Provider, MD   aspirin 81 MG chewable tablet Take 1 tablet by mouth daily. 1/11/13   Patricia Rosario MD   isosorbide mononitrate (IMDUR) 30 MG CR tablet Take 1 tablet by mouth daily. 1/11/13   Patricia Rosario MD   ferrous sulfate 325 (65 FE) MG tablet Take 325 mg by mouth 3 times daily (with meals). 12/22/10   Historical Provider, MD   omeprazole (PRILOSEC) 20 MG capsule Take 20 mg by mouth 2 times daily. 12/22/10   Historical Provider, MD     Allergies:  Amitriptyline, Celecoxib, Cymbalta [duloxetine hcl], Elavil [amitriptyline hcl], Gabapentin, and Nsaids    Social History:      The patient currently lives at home    TOBACCO:   reports that he quit smoking about 34 years ago. He has a 16.00 pack-year smoking history. He has never used smokeless tobacco.  ETOH:   reports no history of alcohol use. E-Cigarettes/Vaping Use     Questions Responses    E-Cigarette/Vaping Use Never User    Start Date     Passive Exposure     Quit Date     Counseling Given     Comments         Family History:      Reviewed in detail. Positive as follows:        Problem Relation Age of Onset    Heart Disease Mother     Heart Disease Father     Cancer Father     Diabetes Brother     Diabetes Brother      REVIEW OF SYSTEMS COMPLETED:   Pertinent positives as noted in the HPI. All other systems reviewed and negative. PHYSICAL EXAM PERFORMED:    BP (!) 107/57   Pulse 69   Temp 97.6 °F (36.4 °C) (Oral)   Resp 20   SpO2 97%     General appearance:  Pleasant, obese male in no apparent distress, appears stated age and cooperative. HEENT:  Pupils equal, round, and reactive to light. Extra ocular muscles intact. Conjunctivae/corneas clear. Neck: Supple, with full range of motion. No jugular venous distention. Trachea midline. Respiratory:  Normal respiratory effort. Diminished BS bilaterally t/o. Crackles bilateral bases. 4.5 L NC  Cardiovascular:  Regular rate and rhythm with normal S1/S2 without murmurs, rubs or gallops. Abdomen: Soft, non-tender, non-distended with normal bowel sounds. Musculoskeletal:  No clubbing, cyanosis or edema bilaterally. Full range of motion without deformity. Skin: Skin color, texture, turgor normal.  BLE 2+ pitting edema. Left leg red weeping  Neurologic:  Neurovascularly intact.  Cranial nerves: II-XII intact, grossly non-focal.  Psychiatric:  Alert and oriented, thought content appropriate, normal insight  Capillary Refill: Brisk,3 seconds, normal  Peripheral Pulses: +2 palpable, equal bilaterally     Labs:     Recent Labs     03/31/22 2003   WBC 7.6   HGB 10.0*   HCT 31.0*        Recent Labs     03/31/22 2003   *   K 3.4*   CL 86*   CO2 39*   *   CREATININE 1.8*   CALCIUM 9.0     Recent Labs     03/31/22 2003   AST 15   ALT 9*   BILITOT 0.4   ALKPHOS 92     Recent Labs     03/31/22 2003   INR 1.29*     Recent Labs     03/31/22 2003   TROPONINI 0.05*     Urinalysis:      Lab Results   Component Value Date    NITRU Negative 07/08/2019    WBCUA 0-2 11/05/2013    BACTERIA Rare 11/05/2013    RBCUA 0-2 11/05/2013    BLOODU Negative 07/08/2019    SPECGRAV 1.010 07/08/2019    GLUCOSEU Negative 07/08/2019    GLUCOSEU NEGATIVE 02/29/2012     Radiology:     CXR: I have reviewed the CXR with the following interpretation: non specific pulmonary opacities in bilateral lungs can be seen with CHF. EKG:  I have reviewed the EKG with the following interpretation:     XR CHEST PORTABLE   Final Result   1. Nonspecific pulmonary opacities in the bilateral lungs can be seen with   congestive heart failure on a background of smoking or atypical/viral   pneumonia. Similar appearance to prior studies. 2. Calcific atherosclerosis aorta. 3. Cardiomegaly. ASSESSMENT:    Active Hospital Problems    Diagnosis Date Noted    Acute on chronic diastolic CHF (congestive heart failure) [facility] [I50.33] 03/07/2022    Obesity, Class III, BMI 40-49.9 (morbid obesity) ([facility] [E66.01] 04/03/2018    HLD (hyperlipidemia) [E78.5] 06/24/2015    Essential hypertension [I10] 02/06/2012    Coronary artery disease involving native coronary artery of native heart without angina pectoris [I25.10] 01/11/2012    DM (diabetes mellitus) ([facility] [E11.9] 01/11/2012     PLAN:    Acute respiratory failure with hypoxia in patient with acute on chronic diastolic CHF. SPO2 84% on baseline 4 LNC, placed on NRB and weaned to 10 L HFNC and down to 4 LNC  -CXR revealed: nonspecific pulmonary opacities in bilateral lungs can be seen with CHF. -proBNP 2,358, increased from 857 on 3/13/2022  -40 mg IV lasix given in ED  -nitro paste in ED  -hold demadex and spironolactone for now  -strict I&O  -daily weights  -IV lasix BID  -cardiology consult - appreciate recommendations in advance  -CHF nurse consult - thank you  ECHO 1/14/2022  Limited only f/u for LVEF and aortic valve stenosis. Technically difficult examination. Low normal left ventricular systolic function with ejection fraction of 50%. Moderate aortic stenosis.    Aortic

## 2022-04-01 NOTE — ED PROVIDER NOTES
Emergency Department Provider Note  Location: [facility]  ED  3/31/2022     Patient Identification  Shay Cho Se is a 75 y.o. male    Chief Complaint  Shortness of Breath (medics report shortness of breath, pt on home oxygen 4 liters, pt placed on non rebreather. pt wheezing. pt given douneb. 10 liters  bs 268) and Leg Problem (pt seeing wound care for left leg wheeping. pt reports this was tuesday. )    HPI  (History provided by patient)  This is a 75 y.o. male with a PMH significant for CHF presented today for shortness of breath. The patient was admitted on March 7 and discharged on March 14 for CHF exacerbation. After he got home, he weighed himself on his scale and he was 282 pounds. He noticed gradual increase in weight and worsening leg edema despite taking his diuretics as prescribed. He contacted his cardiology office and was instructed to increase his torsemide. Patient said he did as instructed but despite the new dosage, his weight was 307 pounds today. He has worsening dyspnea on exertion. He denies chest pain. ROS  Review of Systems   Constitutional: Positive for fatigue and unexpected weight change (25 lb weight gain since March 14). Negative for chills and fever. HENT: Negative for congestion. Eyes: Negative for visual disturbance (No acute vision changes. Patient states he has chronic poor vision that he can't see the numbers on his scale so he had to get a scale that reads the numbers out loud to him. ). Respiratory: Positive for shortness of breath. Negative for cough. Cardiovascular: Positive for leg swelling. Gastrointestinal: Negative for abdominal pain, nausea and vomiting. Genitourinary: Negative for dysuria. Musculoskeletal: Positive for back pain (chronic). Skin: Positive for rash (patient is currently being treated for yeast infection in his skin folds) and wound (Chronic wounds on the lower leg bilaterally).    Neurological: Negative for headaches. I have reviewed the following nursing documentation:  Allergies: Allergies   Allergen Reactions    Amitriptyline Other (See Comments)     tremor    Celecoxib      Hyperkalemia    Cymbalta [Duloxetine Hcl] Other (See Comments)     Tremors and slurred speech    Elavil [Amitriptyline Hcl]      tremor    Gabapentin      Tremor at high dose    Nsaids      Gastric ulcer       Past medical history:  has a past medical history of Anemia, Angina, Arthritis, CAD (coronary artery disease), CHF (congestive heart failure) ([address] CKD (chronic kidney disease) stage 3, GFR 30-59 ml/min ([address] Clostridium difficile diarrhea (3/16/12; 2/29/12), Diabetes mellitus ([address] Diabetic neuropathy ([address] Disease of blood and blood forming organ, Dizziness, GERD (gastroesophageal reflux disease), Headache, Hearing loss, Hyperlipidemia, Hypertension, Kidney stone (2002), Refusal of blood transfusions as patient is [employer], Sleep apnea, Tinnitus, Venous ulcer ([address] and Wound, open (1/13/2012). Past surgical history:  has a past surgical history that includes hernia repair (age1); Cholecystectomy (9/2011); other surgical history (11-16-11 REPAIR LOWER STERNAL INCISION, REMOVAL OF ONE STERNAL WIRE,); Upper gastrointestinal endoscopy; Cardiac surgery; other surgical history (10/10/2014); and Pain management procedure (N/A, 2/10/2022). Home medications:   Prior to Admission medications    Medication Sig Start Date End Date Taking? Authorizing Provider   oxyCODONE-acetaminophen (PERCOCET) 5-325 MG per tablet Take 1 tablet by mouth 4 times daily for 30 days. 3/28/22 4/27/22  Gamaliel Hernandez MD   oxyCODONE-acetaminophen (PERCOCET) 5-325 MG per tablet Take 1 tablet by mouth 4 times daily for 30 days.  4/26/22 5/26/22  Gamaliel Hernandez MD   atorvastatin (LIPITOR) 80 MG tablet Take 1 tablet by mouth nightly 3/14/22   PATRICK Ramos - CNP   carvedilol (COREG) 6.25 MG tablet Take 1 tablet by mouth 2 times daily (with meals) 3/14/22   PATRICK Miller CNP   metOLazone (ZAROXOLYN) 5 MG tablet Once a week on thursdays 3/14/22   PATRICK Miller CNP   torsemide [facility]) 100 MG tablet Take 0.5 tablets by mouth 2 times daily 3/14/22   PATRICK Miller CNP   pregabalin (LYRICA) 25 MG capsule Take 1 capsule by mouth 2 times daily for 1 day. 3/14/22 3/15/22  Dom Issa MD   allopurinol (ZYLOPRIM) 300 MG tablet Take 1 tablet by mouth daily 3/15/22   Dom Issa MD   aspirin-acetaminophen-caffeine [facility] MIGRAINE) 252-943-41 MG per tablet Take 1 tablet by mouth every 6 hours as needed for Headaches    Historical Provider, MD   naloxone 4 MG/0.1ML LIQD nasal spray 1 spray by Nasal route as needed for Opioid Reversal 11/23/21   Brandie Carlos MD   spironolactone (ALDACTONE) 25 MG tablet Take 1 tablet by mouth daily 9/1/21   PATRICK Miller CNP   linaCLOtide (LINZESS PO) Take by mouth    Historical Provider, MD   acetaminophen (TYLENOL) 500 MG tablet Take 2 tablets by mouth 4 times daily as needed for Pain 9/17/20   PATRICK Garcia CNP   hydrocortisone (ANUSOL-HC) 2.5 % rectal cream Place rectally 2 times daily.  8/7/19   PATRICK Cifuentes CNP   senna (SENOKOT) 8.6 MG TABS tablet Take 1 tablet by mouth 2 times daily 7/8/19   PATRICK Medeiros CNP   docusate sodium (COLACE, DULCOLAX) 100 MG CAPS Take 100 mg by mouth 2 times daily 5/1/18   PATRICK Fermin CNP   insulin aspart (NOVOLOG) 100 UNIT/ML injection vial Inject into the skin 3 times daily (before meals) 50 units with breakfast, 20 units with lunch and 60 units with dinner    Historical Provider, MD   insulin glargine (LANTUS) 100 UNIT/ML injection vial Inject 55 Units into the skin 2 times daily  Patient taking differently: Inject 80 Units into the skin 2 times daily  1/15/17   German Patino MD   Compression Stockings MISC by Does not apply route 2 pair, knee high compression stockings, fitted/ zippered 6/15/16   PATRICK Gayle CNP   silver sulfADIAZINE (SILVADENE) 1 % cream Apply to BL lower leg ulcers daily 2/9/16   Ctilali Castelan MD   nitroGLYCERIN (NITROSTAT) 0.4 MG SL tablet Place 1 tablet under the tongue every 5 minutes as needed 8/17/15   PATRICK Gayle CNP   fluticasone (FLONASE) 50 MCG/ACT nasal spray 1 spray by Nasal route nightly as needed. Historical Provider, MD   aspirin 81 MG chewable tablet Take 1 tablet by mouth daily. 1/11/13   Joseph Arreola MD   isosorbide mononitrate (IMDUR) 30 MG CR tablet Take 1 tablet by mouth daily. 1/11/13   Joseph Arreola MD   ferrous sulfate 325 (65 FE) MG tablet Take 325 mg by mouth 3 times daily (with meals). 12/22/10   Historical Provider, MD   omeprazole (PRILOSEC) 20 MG capsule Take 20 mg by mouth 2 times daily. 12/22/10   Historical Provider, MD       Social history:  reports that he quit smoking about 34 years ago. He has a 16.00 pack-year smoking history. He has never used smokeless tobacco. He reports that he does not drink alcohol and does not use drugs. Family history:    Family History   Problem Relation Age of Onset    Heart Disease Mother     Heart Disease Father     Cancer Father     Diabetes Brother     Diabetes Brother        Exam  ED Triage Vitals [03/31/22 1957]   BP Temp Temp Source Pulse Resp SpO2 Height Weight   (!) 107/57 97.6 °F (36.4 °C) Oral 69 20 98 % -- --   Physical Exam  Vitals and nursing note reviewed. Constitutional:       General: He is not in acute distress. Appearance: He is well-developed. He is obese. He is not diaphoretic. HENT:      Head: Normocephalic and atraumatic. Eyes:      General: No scleral icterus. Right eye: No discharge. Left eye: No discharge. Conjunctiva/sclera: Conjunctivae normal.   Neck:      Trachea: No tracheal deviation. Cardiovascular:      Rate and Rhythm: Normal rate and regular rhythm. Heart sounds: Normal heart sounds. No murmur heard. Pulmonary:      Effort: Pulmonary effort is normal. Tachypnea present. Breath sounds: No stridor. Decreased breath sounds (in the bases) and rhonchi (bilaterally) present. No wheezing. Abdominal:      General: There is no distension. Palpations: Abdomen is soft. Tenderness: There is no abdominal tenderness. There is no guarding or rebound. Musculoskeletal:         General: No deformity. Cervical back: Neck supple. Right lower leg: Edema present. Left lower leg: Edema present. Comments: 3+ pitting edema in bilateral LE   Skin:     General: Skin is warm and dry. Findings: Wound (Chronic skin changes in bilateral lower extremities, with open wounds that are weeping serous fluid) present. No erythema or rash. Neurological:      Mental Status: He is alert and oriented to person, place, and time. Cranial Nerves: No dysarthria or facial asymmetry. Motor: No weakness or tremor. Coordination: Coordination normal.   Psychiatric:         Attention and Perception: Attention normal.         Speech: Speech normal.         Behavior: Behavior normal. Behavior is cooperative. MDM/ED Course  ED Medication Orders (From admission, onward)    Start Ordered     Status Ordering Provider    03/31/22 2300 03/31/22 2248  nitroglycerin (NITRO-BID) 2 % ointment 0.5 inch  ONCE         Last MAR action: Given - by Tierney Younger on 03/31/22 at [address]    03/31/22 2145 03/31/22 2134  furosemide (LASIX) injection 40 mg  ONCE         Last MAR action: Given - by Tierney Younger on 03/31/22 at [address] [facility]          EKG  The Ekg interpreted by me in the absence of a cardiologist shows. sinus bradycardia, rate=57    Axis is   Left axis deviation  QTc is  normal  Intervals and Durations are unremarkable. No specific ST-T wave changes appreciated. No evidence of acute ischemia.    No significant change from prior EKG dated 3/8/22        Radiology  XR CHEST PORTABLE    Result Date: 3/31/2022  EXAMINATION: ONE XRAY VIEW OF THE CHEST 3/31/2022 8:18 pm COMPARISON: Chest radiograph 03/07/2022 and CT chest 01/13/2022 HISTORY: Short of breath FINDINGS: The cardiac silhouette is enlarged. Calcifications involving the aorta reflect atherosclerosis. The mediastinal and hilar silhouettes appear unremarkable. Vascular engorgement and cephalization is demonstrated with bilateral peribronchial cuffing and perivascular haziness. Possible right pleural effusion. No dense consolidation. Chronic appearing coarse interstitial densities predominate perihilar regions and lung bases, typical of sequela from smoking or other previous infectious/inflammatory process. No pneumothorax is seen. No acute osseous abnormality is identified. Stable sequela from open-heart surgery. 1. Nonspecific pulmonary opacities in the bilateral lungs can be seen with congestive heart failure on a background of smoking or atypical/viral pneumonia. Similar appearance to prior studies. 2. Calcific atherosclerosis aorta. 3. Cardiomegaly.          Labs  Results for orders placed or performed during the hospital encounter of 03/31/22   CBC with Auto Differential   Result Value Ref Range    WBC 7.6 4.0 - 11.0 K/uL    RBC 3.52 (L) 4.20 - 5.90 M/uL    Hemoglobin 10.0 (L) 13.5 - 17.5 g/dL    Hematocrit 31.0 (L) 40.5 - 52.5 %    MCV 88.2 80.0 - 100.0 fL    MCH 28.5 26.0 - 34.0 pg    MCHC 32.3 31.0 - 36.0 g/dL    RDW 19.7 (H) 12.4 - 15.4 %    Platelets 884 278 - 453 K/uL    MPV 8.8 5.0 - 10.5 fL    Neutrophils % 84.8 %    Lymphocytes % 6.9 %    Monocytes % 6.0 %    Eosinophils % 1.9 %    Basophils % 0.4 %    Neutrophils Absolute 6.5 1.7 - 7.7 K/uL    Lymphocytes Absolute 0.5 (L) 1.0 - 5.1 K/uL    Monocytes Absolute 0.5 0.0 - 1.3 K/uL    Eosinophils Absolute 0.1 0.0 - 0.6 K/uL    Basophils Absolute 0.0 0.0 - 0.2 K/uL   Comprehensive Metabolic Panel w/ Reflex to MG   Result Value Ref Range    Sodium 135 (L) 136 - 145 mmol/L    Potassium reflex Magnesium 3.4 (L) 3.5 - 5.1 mmol/L    Chloride 86 (L) 99 - 110 mmol/L    CO2 39 (H) 21 - 32 mmol/L    Anion Gap 10 3 - 16    Glucose 259 (H) 70 - 99 mg/dL     (HH) 7 - 20 mg/dL    CREATININE 1.8 (H) 0.8 - 1.3 mg/dL    GFR Non- 38 (A) >60    GFR  46 (A) >60    Calcium 9.0 8.3 - 10.6 mg/dL    Total Protein 6.8 6.4 - 8.2 g/dL    Albumin 4.0 3.4 - 5.0 g/dL    Albumin/Globulin Ratio 1.4 1.1 - 2.2    Total Bilirubin 0.4 0.0 - 1.0 mg/dL    Alkaline Phosphatase 92 40 - 129 U/L    ALT 9 (L) 10 - 40 U/L    AST 15 15 - 37 U/L   Troponin   Result Value Ref Range    Troponin 0.05 (H) <0.01 ng/mL   Brain Natriuretic Peptide   Result Value Ref Range    Pro-BNP 2,358 (H) 0 - 124 pg/mL   Protime-INR   Result Value Ref Range    Protime 14.7 (H) 9.9 - 12.7 sec    INR 1.29 (H) 0.88 - 1.12   APTT   Result Value Ref Range    aPTT 39.2 (H) 26.2 - 38.6 sec   Blood Gas, Venous   Result Value Ref Range    pH, Angel 7.411 7.350 - 7.450    pCO2, Angel 66.3 (H) 40.0 - 50.0 mmHg    pO2, Angel 50.0 (H) 25.0 - 40.0 mmHg    HCO3, Venous 41.2 (H) 23.0 - 29.0 mmol/L    Base Excess, Angel 14.0 (H) -3.0 - 3.0 mmol/L    O2 Sat, Angel 82 Not Established %    Carboxyhemoglobin 1.6 (H) 0.0 - 1.5 %    MetHgb, Angel 0.4 <1.5 %    TC02 (Calc), Angel 43 Not Established mmol/L    O2 Therapy Unknown    Magnesium   Result Value Ref Range    Magnesium 2.60 (H) 1.80 - 2.40 mg/dL         - Patient seen and evaluated in room 25.  75 y.o. male presented for SOB, weight gain. Signs and symptoms are concerning for acute on chronic CHF exacerbation.  - Exam showed significant pitting edema bilateral lower extremity. Patient appears fluid overloaded. - Patient was placed on telemetry during his/her ED stay and no malignant dysrhythmia observed. - Pertinent old records reviewed, including review most recent hospital admission record.   Most recent echocardiogram in January 2022 showed LVEF 50% with moderate aortic stenosis. - Patient was given lasix and nitropaste in the ED.    - Diagnostic studies reviewed. Work-up consistent with CHF exacerbation with pulmonary edema seen on chest x-ray and significantly elevated BNP when compared to patient's baseline. Patient's renal function is also worse than his baseline today. His anemia is baseline.    - We ambulated the patient in the ED and he dropped his O2 sat to 88% with his baseline 4 L of oxygen. We had to temporarily increase his O2 to get his sat above 90% before titrating back down to 4L. Patient is also failing outpatient treatment with increased oral dosage of diuretics. We will plan on admission for IV diuretics. - I discussed the results with patient. We agreed to admission for CHF exacerbation. I spoke with Dr. Patrizia Maradiaga, hospitalist. We thoroughly discussed the history, physical exam, laboratory and imaging studies, as well as, emergency department course. Based upon that discussion, we've decided to admit Alexandra Valdez for further observation and evaluation of [address]'s dyspnea. As I have deemed necessary from their history, physical, and studies, I have considered and evaluated Alexandra Valdez for the following diagnoses:  ACUTE CORONARY SYNDROME, CHRONIC OBSTRUCTIVE PULMONARY DISEASE, CONGESTIVE HEART FAILURE, PERICARDIAL TAMPONADE, PNEUMONIA, PNEUMOTHORAX, PULMONARY EMBOLISM, SEPSIS, and THORACIC DISSECTION. Clinical Impression:  1. Acute on chronic congestive heart failure, unspecified heart failure type ([address]    2. Acute respiratory failure with hypoxia (HCC)    3. Acute on chronic renal insufficiency          Disposition:  Admit to telemetry in stable condition    Blood pressure (!) 107/57, pulse 69, temperature 97.6 °F (36.4 °C), temperature source Oral, resp. rate 20, SpO2 97 %. Total critical care time is 35 minutes, which excludes separately billable procedures and updating family.  Time spent is specifically for management of the presenting complaint and symptoms initially, direct bedside care, reevaluation, review of records, and consultation. There was a high probability of clinically significant life-threatening deterioration in the patient's condition, which required my urgent intervention. This chart was generated in part by using Dragon Dictation system and may contain errors related to that system including errors in grammar, punctuation, and spelling, as well as words and phrases that may be inappropriate. If there are any questions or concerns please feel free to contact the dictating provider for clarification.      Jarad Romero MD  [address] [place] [address] Jonathan Castro MD  04/01/22 2309

## 2022-04-01 NOTE — PROGRESS NOTES
Pt refusing bed alarm at this time. States, \"I'm a confined prisoner! \" and \"This crosses the line from caring to abuse! \"

## 2022-04-01 NOTE — CONSULTS
Nutrition Education    RD received consult for CHF nutrition education. Pt reports not ready to discuss diet modifications to decrease sodium intakes at this time. Pt continues to consume high sodium sandwiches, RD attempted to assist pt with substitutions and pt declined. Will continue to monitor per nutrition standards of care.      Armand Sands MS, RD, LD on 4/1/2022 at 12:11 PM  Office: [phone]

## 2022-04-01 NOTE — CARE COORDINATION
CASE MANAGEMENT INITIAL ASSESSMENT    Reviewed chart and completed assessment with patient:  Family present: None  Explained Case Management role/services. Health Care Decision Maker :   Primary Decision Maker: Urbano Rahman - [phone]        Admit date/status: 3/31 Inpatient  Diagnosis: Acute Respiratory Failure With Hypoxia   Is this a Readmission?:  Yes      Insurance: [id number] required for SNF: Yes       3 night stay required: No    Living arrangements, Adls, care needs, prior to admission: Pt lives in apartment alone, uses cane, has [employer] for home oxygen, active with [employer], and [facility] [employer]. Pt is on a waitlist for [facility] [employer] at [facility]. Current PCP: Slade FERRARA    Transportation needs: Friend drives     PT/OT recs: Not ordered    Barriers to discharge: None noted    Plan/comments: Pt said his friend called [employer] and they are working on getting him small portability instead of concentrator with tubing all over, since he has tunnel vision and is a fall risk. Pt wanted updated information on [employer], writer provided brochure from online. Pt denies any new needs, plans to resume home care services. CM will continue to follow pt's progress and coordinate discharge arrangements. [facility] on chart for MD signature.   PEYMAN Piper-RN

## 2022-04-01 NOTE — CONSULTS
CARDIOLOGY CONSULTATION        Patient Name: Faith Roman  Date of admission: 3/31/2022  7:52 PM  Admission Dx: Acute on chronic renal insufficiency [N28.9, N18.9]  Acute respiratory failure with hypoxia ([facility] [address] [J96.01]  Acute on chronic congestive heart failure, unspecified heart failure type [facility]) [I50.9]  Requesting Physician: No Castillo MD  Primary Care physician: Jhoan Kessler, APRN - CNP    Reason for Consultation/Chief Complaint: CHF    History of Present Illness:     Faith Roman is a 75 y.o. patient with a prior medical history notable for chronic HFpEF, most recent ejection fraction 50% 1/2022, coronary artery disease status post CABG in 2010 with most recent catheterization 2012 showing patent grafts, diabetes mellitus, hypertension, hyperlipidemia, chronic kidney disease, moderate aortic stenosis, sleep apnea and obesity, presenting for worsening shortness of breath. Noted patient was recently admitted 3/7-3/14 for CHF. Weight on day of discharge was 282 pounds. Patient was discharged with torsemide 50 mg twice daily, Aldactone and metolazone 2.5 mg once weekly on Thursdays for diuretics. He was noted to have a positive stress test with mixed ischemia/scar. ED course/Vital signs on presentation: Blood pressure 107/57, heart rate 69, afebrile, 98% on 10 L nasal cannula which was quickly weaned to 4 L. Chest x-ray showed nonspecific pulmonary opacities bilateral lungs consistent with congestive heart failure versus atypical pneumonia. Cardiomegaly noted. Potassium 3.4, creatinine 1.8 which is baseline, proBNP is 2058, troponin 0 0.05 with downtrend which is baseline, hemoglobin 10. Patient evaluated this afternoon. He has gained 14 lbs since last admission per our weights here at the hospital.  Patient has well-documented issues checking weight at home with eyesight and balance. States he was taking his diuretic appropriately.   He was supposed to be taking torsemide 50 mg twice daily. Noticed he was gaining weight getting more short of breath so he increases to 100 mg twice daily. He also increased his metolazone frequency to 3 times weekly. Unfortunately symptoms were not improving so he represented for medical attention. Does note that he has high salt intake with his meals at home. States oxygen is baseline 4 L. The patient denies chest pain, shortness of breath at rest and dyspnea with exertion. Denies palpitations, dizziness, near-syncope or nelida syncope. Former smoker. Past Medical History:   has a past medical history of Anemia, Angina, Arthritis, CAD (coronary artery disease), CHF (congestive heart failure) ([facility] [address] CKD (chronic kidney disease) stage 3, GFR 30-59 ml/min ([facility]), Clostridium difficile diarrhea, Diabetes mellitus ([facility] [address] Diabetic neuropathy ([facility] [address] Disease of blood and blood forming organ, Dizziness, GERD (gastroesophageal reflux disease), Headache, Hearing loss, Hyperlipidemia, Hypertension, Kidney stone, Refusal of blood transfusions as patient is [employer], Sleep apnea, Tinnitus, Venous ulcer ([address] and Wound, open. Surgical History:   has a past surgical history that includes hernia repair (age1); Cholecystectomy (9/2011); other surgical history (11-16-11 REPAIR LOWER STERNAL INCISION, REMOVAL OF ONE STERNAL WIRE,); Upper gastrointestinal endoscopy; Cardiac surgery; other surgical history (10/10/2014); and Pain management procedure (N/A, 2/10/2022). Social History:   reports that he quit smoking about 34 years ago. He has a 16.00 pack-year smoking history. He has never used smokeless tobacco. He reports that he does not drink alcohol and does not use drugs. Family History:  family history includes Cancer in his father; Diabetes in his brother and brother; Heart Disease in his father and mother.       Home Medications:  Were reviewed and are listed in nursing record and/or below  Prior to Admission medications Medication Sig Start Date End Date Taking? Authorizing Provider   oxyCODONE-acetaminophen (PERCOCET) 5-325 MG per tablet Take 1 tablet by mouth 4 times daily for 30 days. 3/28/22 4/27/22  Mat Phipps MD   oxyCODONE-acetaminophen (PERCOCET) 5-325 MG per tablet Take 1 tablet by mouth 4 times daily for 30 days. 4/26/22 5/26/22  Mat Phipps MD   atorvastatin (LIPITOR) 80 MG tablet Take 1 tablet by mouth nightly 3/14/22   PATRICK De La Rosa CNP   carvedilol (COREG) 6.25 MG tablet Take 1 tablet by mouth 2 times daily (with meals) 3/14/22   PATRICK De La Rosa CNP   metOLazone (ZAROXOLYN) 5 MG tablet Once a week on thursdays 3/14/22   PATRICK De La Rosa CNP   torsemide [facility]) 100 MG tablet Take 0.5 tablets by mouth 2 times daily 3/14/22   PATRICK De La Rosa CNP   pregabalin (LYRICA) 25 MG capsule Take 1 capsule by mouth 2 times daily for 1 day. 3/14/22 3/15/22  Guy Lau MD   allopurinol (ZYLOPRIM) 300 MG tablet Take 1 tablet by mouth daily 3/15/22   Guy Lau MD   aspirin-acetaminophen-caffeine [facility] MIGRAINE) 893-729-61 MG per tablet Take 1 tablet by mouth every 6 hours as needed for Headaches    Historical Provider, MD   naloxone 4 MG/0.1ML LIQD nasal spray 1 spray by Nasal route as needed for Opioid Reversal  Patient not taking: Reported on 4/1/2022 11/23/21   Mat Phipps MD   spironolactone (ALDACTONE) 25 MG tablet Take 1 tablet by mouth daily 9/1/21   PATRICK De La Rosa CNP   linaCLOtide (LINZESS PO) Take by mouth    Historical Provider, MD   acetaminophen (TYLENOL) 500 MG tablet Take 2 tablets by mouth 4 times daily as needed for Pain  Patient not taking: Reported on 4/1/2022 9/17/20   PATRICK Chou CNP   hydrocortisone (ANUSOL-HC) 2.5 % rectal cream Place rectally 2 times daily.  8/7/19   Cornelius Puentes APRN - CNP   senna (SENOKOT) 8.6 MG TABS tablet Take 1 tablet by mouth 2 times daily 7/8/19   Nany Mann, APRN - CNP docusate sodium (COLACE, DULCOLAX) 100 MG CAPS Take 100 mg by mouth 2 times daily 5/1/18   PATRICK Hyde CNP   insulin aspart (NOVOLOG) 100 UNIT/ML injection vial Inject into the skin 3 times daily (before meals) 50 units with breakfast, 20 units with lunch and 60 units with dinner    Historical Provider, MD   insulin glargine (LANTUS) 100 UNIT/ML injection vial Inject 55 Units into the skin 2 times daily  Patient taking differently: Inject 80 Units into the skin 2 times daily  1/15/17   Pura Krabbe, MD   Compression Stockings MISC by Does not apply route 2 pair, knee high compression stockings, fitted/ zippered 6/15/16   Nicki Merlin, APRN - CNP   silver sulfADIAZINE (SILVADENE) 1 % cream Apply to BL lower leg ulcers daily 2/9/16   Mckayla Anaya MD   nitroGLYCERIN (NITROSTAT) 0.4 MG SL tablet Place 1 tablet under the tongue every 5 minutes as needed 8/17/15   Nicki Merlin, APRN - CNP   fluticasone (FLONASE) 50 MCG/ACT nasal spray 1 spray by Nasal route nightly as needed. Historical Provider, MD   aspirin 81 MG chewable tablet Take 1 tablet by mouth daily. 1/11/13   Jesusa Phalen, MD   isosorbide mononitrate (IMDUR) 30 MG CR tablet Take 1 tablet by mouth daily. 1/11/13   Jesusa Phalen, MD   ferrous sulfate 325 (65 FE) MG tablet Take 325 mg by mouth 2 times daily  12/22/10   Historical Provider, MD   omeprazole (PRILOSEC) 20 MG capsule Take 20 mg by mouth 2 times daily.  12/22/10   Historical Provider, MD        CURRENT Medications:  aspirin chewable tablet 81 mg, Daily  atorvastatin (LIPITOR) tablet 80 mg, Nightly  carvedilol (COREG) tablet 6.25 mg, BID WC  ferrous sulfate (IRON 325) tablet 325 mg, TID WC  insulin glargine (LANTUS) injection vial 80 Units, BID  isosorbide mononitrate (IMDUR) extended release tablet 30 mg, Daily  oxyCODONE-acetaminophen (PERCOCET) 5-325 MG per tablet 1 tablet, 4x Daily  silver sulfADIAZINE (SILVADENE) 1 % cream, Daily  pregabalin (LYRICA) capsule 25 mg, BID  sodium chloride flush 0.9 % injection 5-40 mL, 2 times per day  sodium chloride flush 0.9 % injection 5-40 mL, PRN  0.9 % sodium chloride infusion, PRN  ondansetron (ZOFRAN-ODT) disintegrating tablet 4 mg, Q8H PRN   Or  ondansetron (ZOFRAN) injection 4 mg, Q6H PRN  polyethylene glycol (GLYCOLAX) packet 17 g, Daily PRN  acetaminophen (TYLENOL) tablet 650 mg, Q6H PRN   Or  acetaminophen (TYLENOL) suppository 650 mg, Q6H PRN  enoxaparin (LOVENOX) injection 30 mg, BID  insulin lispro (HUMALOG) injection vial 0-18 Units, TID WC  insulin lispro (HUMALOG) injection vial 0-9 Units, Nightly  glucose (GLUTOSE) 40 % oral gel 15 g, PRN  glucagon (rDNA) injection 1 mg, PRN  dextrose 5 % solution, PRN  furosemide (LASIX) injection 40 mg, BID  dextrose bolus (hypoglycemia) 10% 125 mL, PRN   Or  dextrose bolus (hypoglycemia) 10% 250 mL, PRN        Allergies:  Amitriptyline, Celecoxib, Cymbalta [duloxetine hcl], Elavil [amitriptyline hcl], Gabapentin, and Nsaids     Review of Systems:   A 14 point review of symptoms completed. Pertinent positives identified in the HPI, all other review of symptoms negative as below. Objective:     Vitals:    04/01/22 0342 04/01/22 0800 04/01/22 1211 04/01/22 1212   BP: 115/70 (!) 105/43 120/62    Pulse: 61 63 70 68   Resp: 18 18 18    Temp: 97.4 °F (36.3 °C) 98 °F (36.7 °C)     TempSrc: Oral Oral     SpO2: 96% 94% (!) 89% 91%   Weight:       Height:          Weight: 296 lb 8 oz (134.5 kg)       PHYSICAL EXAM:    General:  Alert, cooperative, no distress, appears stated age   Head:  Normocephalic, atraumatic   Eyes:  Conjunctiva/corneas clear, anicteric sclerae    Nose: Nares normal, no drainage or sinus tenderness   Throat: No abnormalities of the lips, oral mucosa or tongue. Neck: Trachea midline.  Neck supple with no lymphadenopathy, thyroid not enlarged, symmetric, no tenderness/mass/nodules    Lungs:    Bilateral rales mid lungs and bases, stable on 4 L oxygen   Chest Wall:  No deformity or tenderness to palpation   Heart:  Regular rate and rhythm, normal S1, normal S2, no murmur, no rub, no S3/S4, PMI non-displaced. Abdomen:    Obesity, soft, non-tender, with normoactive bowel sounds. No masses, no hepatosplenomegaly   Extremities: No cyanosis, clubbing tense pitting edema    Vascular: 2+ radial, brachial, femoral, dorsalis pedis and posterior tibial pulses bilaterally. Brisk carotid upstrokes without carotid bruit. Skin: Weeping left lower extremity wrapped, thickened skin and discoloration   Pysch: Euthymic mood, appropriate affect   Neurologic: Oriented to person, place and time. No slurred speech or facial asymmetry. No motor or sensory deficits on gross examination.          Labs:   CBC:   Lab Results   Component Value Date    WBC 7.7 04/01/2022    RBC 3.66 04/01/2022    HGB 10.3 04/01/2022    HCT 31.9 04/01/2022    MCV 87.1 04/01/2022    RDW 19.2 04/01/2022     04/01/2022     CMP:  Lab Results   Component Value Date     04/01/2022    K 3.5 04/01/2022    K 3.4 03/31/2022    CL 89 04/01/2022    CO2 40 04/01/2022    BUN 96 04/01/2022    CREATININE 1.8 04/01/2022    GFRAA 46 04/01/2022    GFRAA 53 06/06/2013    AGRATIO 1.4 03/31/2022    LABGLOM 38 04/01/2022    GLUCOSE 145 04/01/2022    PROT 6.8 03/31/2022    PROT 7.7 02/04/2013    CALCIUM 9.5 04/01/2022    BILITOT 0.4 03/31/2022    ALKPHOS 92 03/31/2022    AST 15 03/31/2022    ALT 9 03/31/2022     PT/INR:  No results found for: PTINR  HgBA1c:  Lab Results   Component Value Date    LABA1C 7.6 01/13/2022     Lab Results   Component Value Date    CKTOTAL 149 11/16/2012    TROPONINI 0.04 (H) 04/01/2022       Lab Results   Component Value Date    CHOL 104 08/24/2021    CHOL 123 08/12/2021    CHOL 120 06/30/2021     Lab Results   Component Value Date    TRIG 110 08/24/2021    TRIG 141 08/12/2021    TRIG 118 06/30/2021     Lab Results   Component Value Date    HDL 33 (L) 08/24/2021    HDL 31 (L) 08/12/2021    HDL 35 (L) 06/30/2021     Lab Results   Component Value Date    LDLCALC 49 08/24/2021    LDLCALC 64 08/12/2021    LDLCALC 61 06/30/2021     Lab Results   Component Value Date    LABVLDL 22 08/24/2021    LABVLDL 28 08/12/2021    LABVLDL 24 06/30/2021     No results found for: CHOLHDLRATIO     Cardiac Data:     EKG: Sinus bradycardia with nonspecific ST and T wave abnormality    Telemetry personally reviewed: No events    Echo: 1/13/22  Conclusions      Summary   Limited only f/u for LVEF and aortic valve stenosis. Technically difficult examination. Low normal left ventricular systolic function with ejection fraction of 50%. Moderate aortic stenosis. Aortic stenosis values appear approximately same when compared to echo on   8-. Mild to moderate aortic regurgitation. Stress Test: 3/10/22  Summary    Abnormal moderate risk myocardial perfusion study.   Marcin Spatz is a large area of mixed ischemia and scar within the infero-apical,    apical-lateral, lateral, posterior-lateral, and posterior-basal segments.    The left ventricular size is moderately dilated.    The estimated left ventricular function is 67%.             Impression and Plan:      1. Shortness of breath - multifactorial including pulmonary edema. 2.  Acute on chronic diastolic congestive heart failure with preserved EF  3. Coronary artery disease status post coronary artery bypass surgery  in 2010. Most recent ischemic evaluation in 2012 was cardiac catheterization that showed patent grafts. Recent abnormal stress test  last admission  4. Aortic stenosis, moderate. Mild to moderate AI  5. Elevated troponin, likely due to supply-demand imbalance  exacerbated by acute on chronic kidney disease. 6.  Hyperlipidemia. 7.  MARGARITO on Chronic kidney disease - 1.8 (baseline 1.2-1.3). 8.  Morbid obesity. 9.  Obstructive sleep apnea. 10. Diabetes  11. History of atrial arrhythmias. 12. Anemia.   13. Noncompliance     Patient Active Problem List Diagnosis    [facility]    Coronary artery disease involving native coronary artery of native heart without angina pectoris    Anemia of chronic disease    Essential hypertension    Hyperkalemia    Chronic diastolic heart failure [facility]    Pulmonary hypertension due to left ventricular diastolic dysfunction ([facility])    Morbid obesity ([facility])    S/P CABG x 3    DM2 (diabetes mellitus, type 2) ([facility])    Morbid obesity with BMI of 50.0-59.9, adult ([facility])    HLD (hyperlipidemia)    Cardiomyopathy ([facility]    Productive cough    Chronic pain    Severe obstructive sleep apnea    Obesity, Class III, BMI 40-49.9 (morbid obesity) ([facility])    Acute diastolic congestive heart failure ([facility])    Degeneration of lumbar or lumbosacral intervertebral disc    Displacement of lumbar intervertebral disc without myelopathy    Lumbosacral spondylosis without myelopathy    Lumbar facet arthropathy    Disc displacement, lumbar    Spinal stenosis of lumbar region    Mixed conductive and sensorineural hearing loss of left ear with restricted hearing of right ear    Tympanic membrane perforation, left    Chest pain    Cor pulmonale, chronic ([facility])    Pulmonary hypertension due to alveolar hypoventilation disorder ([facility])    Nonrheumatic aortic valve stenosis    Chronic neck pain    Dyspnea    Acute diastolic CHF (congestive heart failure) ([facility])    Cervical stenosis of spinal canal    Degeneration of cervical intervertebral disc    Cervical radiculitis    Acute on chronic diastolic CHF (congestive heart failure) ([facility])    Acute respiratory failure with hypoxia ([facility])         PLAN:  1. Lasix IV 40 mg once now with initiation of Lasix drip at 5 mg/h. patient's BUN noted 96, creatinine 1.8. Clearly remains volume overloaded with trial of diuresis. Will request nephrology consultation to follow along.   2. Strict I/O with external/internal catheter placement to accomplish this on case by case basis, daily standing weights, low salt/caridac diet, and CHF education recommended and discussed with the patient to guide our therapy in the inpatient setting. 3.  Continue Imdur, Coreg twice daily,  4. Continue aspirin, statin, beta-blocker for CAD. Given his lack of angina, renal function concerns, anemia, compliance issues, medically managed stress results. 5.  Replace lites as needed  6. Holding off on spironolactone with creatinine near 2      History of non-compliance with appt's with repeat admissions for CHF. Not seen in office with APNP since 2018. If he could prove compliance may consider cardio-mems for him given sight issues. Adjust diuresis based on I/O. Utilize metolazone as needed. Will follow      I will address the patient's cardiac risk factors and adjusted pharmacologic treatment as needed. In addition, I have reinforced the need for patient directed risk factor modification. All questions and concerns were addressed to the patient/family. Alternatives to my treatment were discussed. Thank you for allowing us to participate in the care of [facility]. Please call me with any questions [phone].     Rachel Sheth MD, [facility] [place]  Cardiovascular Disease  [facility], [place], operated by [facility]  [phone] [facility]  [phone] [address] [place] [address]  4/1/2022 2:25 PM

## 2022-04-01 NOTE — CARE COORDINATION
Writer attempted to assess pt, he is sleeping soundly in bed, will try again later today.   PEYMAN Herrera-RN

## 2022-04-01 NOTE — CONSULTS
[facility] Consult Note           Reason for Consult:  Acute on chronic kidney disease   Requesting Physician:  Dr. Clyde Hernandes    Chief Complaint:  Shortness of breath  History Obtained From:  patient, electronic medical record    History of Present Ilness:    75year old male with CHF, CKD 3, CAD, HTN, DM2, Nondenominational admitted with shortness of breath. We have been asked to assist in further mgmt of his A/CKD3. SCr low mid 1 range, last at 1.5 from mid-March 2022, up to 1.8 with predominant azotemia.  has had worsening shortness of breath, increasing lower extremity edema despite increase in his diuretics. Has gained ~15 pounds since last admission last month.  usually uses ~4 L NC at home. Given IV lasix in ER, started on lasix drip today. Denies any chest pain, abdominal pain, or difficulty with urination.     Past Medical History:        Diagnosis Date    Anemia     Angina     Arthritis     CAD (coronary artery disease)     [facility]     CKD (chronic kidney disease) stage 3, GFR 30-59 ml/min ([facility])     Clostridium difficile diarrhea 3/16/12; 2/29/12    positive stool toxin    Diabetes mellitus ([address]     Diabetic neuropathy ([address]     feet and legs    Disease of blood and blood forming organ     Dizziness     When he moves his head/ loses his balance    GERD (gastroesophageal reflux disease)     gastric ulcer    Headache     Hearing loss     Hyperlipidemia     Hypertension     Kidney stone 2002    Refusal of blood transfusions as patient is Sabianist     Sleep apnea     Tinnitus     Venous ulcer ([address]     LLE    Wound, open 1/13/2012       Past Surgical History:        Procedure Laterality Date    CARDIAC SURGERY      Dec 27 2010, triple bypass    CHOLECYSTECTOMY  9/2011    HERNIA REPAIR  age1    OTHER SURGICAL HISTORY  11-16-11 REPAIR LOWER STERNAL INCISION, REMOVAL OF ONE STERNAL WIRE,    OTHER SURGICAL HISTORY  10/10/2014    phacoemulsification of cataract with intraocular lens implant left eye    PAIN MANAGEMENT PROCEDURE N/A 2/10/2022    MIDLINE CERVICAL SIX SEVEN EPIDURAL STEROID INJECTION SITE CONFIRMED BY FLUOROSCOPY performed by Gala Adamson MD at [address] [facility] Medications:    No current facility-administered medications on file prior to encounter. Current Outpatient Medications on File Prior to Encounter   Medication Sig Dispense Refill    oxyCODONE-acetaminophen (PERCOCET) 5-325 MG per tablet Take 1 tablet by mouth 4 times daily for 30 days. 120 tablet 0    [START ON 4/26/2022] oxyCODONE-acetaminophen (PERCOCET) 5-325 MG per tablet Take 1 tablet by mouth 4 times daily for 30 days. 120 tablet 0    atorvastatin (LIPITOR) 80 MG tablet Take 1 tablet by mouth nightly 30 tablet 3    carvedilol (COREG) 6.25 MG tablet Take 1 tablet by mouth 2 times daily (with meals) 60 tablet 3    metOLazone (ZAROXOLYN) 5 MG tablet Once a week on thursdays 12 tablet 2    torsemide (DEMADEX) 100 MG tablet Take 0.5 tablets by mouth 2 times daily 30 tablet 2    pregabalin (LYRICA) 25 MG capsule Take 1 capsule by mouth 2 times daily for 1 day.  2 capsule 0    allopurinol (ZYLOPRIM) 300 MG tablet Take 1 tablet by mouth daily 30 tablet 0    aspirin-acetaminophen-caffeine (EXCEDRIN MIGRAINE) 250-250-65 MG per tablet Take 1 tablet by mouth every 6 hours as needed for Headaches      naloxone 4 MG/0.1ML LIQD nasal spray 1 spray by Nasal route as needed for Opioid Reversal (Patient not taking: Reported on 4/1/2022) 1 each 0    spironolactone (ALDACTONE) 25 MG tablet Take 1 tablet by mouth daily 30 tablet 3    linaCLOtide (LINZESS PO) Take by mouth      acetaminophen (TYLENOL) 500 MG tablet Take 2 tablets by mouth 4 times daily as needed for Pain (Patient not taking: Reported on 4/1/2022) 60 tablet 0    hydrocortisone (ANUSOL-HC) 2.5 % rectal cream Place rectally 2 times daily. 1 Tube 0    senna (SENOKOT) 8.6 MG TABS tablet Take 1 tablet by mouth 2 times daily 120 tablet 0    docusate sodium (COLACE, DULCOLAX) 100 MG CAPS Take 100 mg by mouth 2 times daily      insulin aspart (NOVOLOG) 100 UNIT/ML injection vial Inject into the skin 3 times daily (before meals) 50 units with breakfast, 20 units with lunch and 60 units with dinner      insulin glargine (LANTUS) 100 UNIT/ML injection vial Inject 55 Units into the skin 2 times daily (Patient taking differently: Inject 80 Units into the skin 2 times daily ) 1 vial 1    Compression Stockings MISC by Does not apply route 2 pair, knee high compression stockings, fitted/ zippered 2 each 1    silver sulfADIAZINE (SILVADENE) 1 % cream Apply to BL lower leg ulcers daily 20 g 0    nitroGLYCERIN (NITROSTAT) 0.4 MG SL tablet Place 1 tablet under the tongue every 5 minutes as needed 25 tablet 1    fluticasone (FLONASE) 50 MCG/ACT nasal spray 1 spray by Nasal route nightly as needed.  aspirin 81 MG chewable tablet Take 1 tablet by mouth daily. 30 tablet     isosorbide mononitrate (IMDUR) 30 MG CR tablet Take 1 tablet by mouth daily. 30 tablet 0    ferrous sulfate 325 (65 FE) MG tablet Take 325 mg by mouth 2 times daily       omeprazole (PRILOSEC) 20 MG capsule Take 20 mg by mouth 2 times daily.          Allergies:  Amitriptyline, Celecoxib, Cymbalta [duloxetine hcl], Elavil [amitriptyline hcl], Gabapentin, and Nsaids    Social History:    Social History     Socioeconomic History    Marital status: Single     Spouse name: Not on file    Number of children: Not on file    Years of education: Not on file    Highest education level: Not on file   Occupational History    Not on file   Tobacco Use    Smoking status: Former Smoker     Packs/day: 1.00     Years: 16.00     Pack years: 16.00     Quit date: 1/10/1988     Years since quittin.2    Smokeless tobacco: Never Used    Tobacco comment: 22 yrs ago Vaping Use    Vaping Use: Never used   Substance and Sexual Activity    Alcohol use: No     Alcohol/week: 0.0 standard drinks    Drug use: No    Sexual activity: Not on file   Other Topics Concern    Not on file   Social History Narrative    Not on file     Social Determinants of Health     Financial Resource Strain:     Difficulty of Paying Living Expenses: Not on file   Food Insecurity:     Worried About Running Out of Food in the Last Year: Not on file    James of Food in the Last Year: Not on file   Transportation Needs:     Lack of Transportation (Medical): Not on file    Lack of Transportation (Non-Medical): Not on file   Physical Activity:     Days of Exercise per Week: Not on file    Minutes of Exercise per Session: Not on file   Stress:     Feeling of Stress : Not on file   Social Connections:     Frequency of Communication with Friends and Family: Not on file    Frequency of Social Gatherings with Friends and Family: Not on file    Attends [employer] Services: Not on file    Active Member of [address] [place] [address] [employer] or Organizations: Not on file    Attends Club or Organization Meetings: Not on file    Marital Status: Not on file   Intimate Partner Violence:     Fear of Current or Ex-Partner: Not on file    Emotionally Abused: Not on file    Physically Abused: Not on file    Sexually Abused: Not on file   Housing Stability:     Unable to Pay for Housing in the Last Year: Not on file    Number of [place] in the Last Year: Not on file    Unstable Housing in the Last Year: Not on file       Family History:   Family History   Problem Relation Age of Onset    Heart Disease Mother     Heart Disease Father     Cancer Father     Diabetes Brother     Diabetes Brother        Review of Systems:   Pertinent positives stated above in HPI. Remainder of 10 point review of systems were reviewed and were negative.     Physical exam:   Constitutional:  VITALS:  BP (!) 143/67   Pulse 72   Temp 98.1 °F Ray Pope MD  [facility]  [email or web address]  Office: [phone]

## 2022-04-01 NOTE — PROGRESS NOTES
Hospitalist Progress Note      PCP: PATRICK Ghotra - CNP    Date of Admission: 3/31/2022    Chief Complaint: Shortness of breath, edema    Hospital Course: Patient with previously known congestive heart failure presented to emergency department due to worsening fluid retention shortness of breath and edema despite increasing his diuretic dose per cardiology recommendations. Started on IV diuretics. Also noted creatinine is elevated. Subjective: No chest pain, has shortness of breath and lower extremity edema. No abdominal pain, no nausea or vomiting.       Medications:  Reviewed    Infusion Medications    sodium chloride      dextrose       Scheduled Medications    aspirin  81 mg Oral Daily    atorvastatin  80 mg Oral Nightly    carvedilol  6.25 mg Oral BID WC    ferrous sulfate  325 mg Oral TID WC    insulin glargine  80 Units SubCUTAneous BID    isosorbide mononitrate  30 mg Oral Daily    oxyCODONE-acetaminophen  1 tablet Oral 4x Daily    silver sulfADIAZINE   Topical Daily    pregabalin  25 mg Oral BID    sodium chloride flush  5-40 mL IntraVENous 2 times per day    enoxaparin  30 mg SubCUTAneous BID    insulin lispro  0-18 Units SubCUTAneous TID WC    insulin lispro  0-9 Units SubCUTAneous Nightly    furosemide  40 mg IntraVENous BID     PRN Meds: sodium chloride flush, sodium chloride, ondansetron **OR** ondansetron, polyethylene glycol, acetaminophen **OR** acetaminophen, glucose, glucagon (rDNA), dextrose, dextrose bolus (hypoglycemia) **OR** dextrose bolus (hypoglycemia)      Intake/Output Summary (Last 24 hours) at 4/1/2022 1210  Last data filed at 4/1/2022 0909  Gross per 24 hour   Intake 360 ml   Output 200 ml   Net 160 ml       Physical Exam Performed:    BP (!) 105/43   Pulse 63   Temp 98 °F (36.7 °C) (Oral)   Resp 18   Ht 5' 9\" (1.753 m)   Wt 296 lb 8 oz (134.5 kg)   SpO2 94%   BMI 43.79 kg/m²     General appearance: No apparent distress, appears stated age and cooperative. HEENT: Pupils equal, round, and reactive to light. Conjunctivae/corneas clear. Neck: Supple, with full range of motion. No jugular venous distention. Trachea midline. Respiratory:  Normal respiratory effort. Bibasilar posterior inspiratory crackles. Cardiovascular: Regular rate and rhythm with normal S1/S2 without murmurs, rubs or gallops. Abdomen: Soft, non-tender, non-distended with normal bowel sounds. Musculoskeletal: No clubbing or cyanosis. 2-3+ lower extremity edema bilaterally. Full range of motion without deformity. Skin: Skin color, texture, turgor normal.  No rashes or lesions. Neurologic:  Neurovascularly intact without any focal sensory/motor deficits. Cranial nerves: II-XII intact, grossly non-focal.  Psychiatric: Alert and oriented, thought content appropriate, normal insight  Capillary Refill: Brisk,3 seconds, normal   Peripheral Pulses: +2 palpable, equal bilaterally       Labs:   Recent Labs     03/31/22 2003 04/01/22  0523   WBC 7.6 7.7   HGB 10.0* 10.3*   HCT 31.0* 31.9*    152     Recent Labs     03/31/22 2003 04/01/22  0523   * 139   K 3.4* 3.5   CL 86* 89*   CO2 39* 40*   * 96*   CREATININE 1.8* 1.8*   CALCIUM 9.0 9.5     Recent Labs     03/31/22 2003   AST 15   ALT 9*   BILITOT 0.4   ALKPHOS 92     Recent Labs     03/31/22 2003   INR 1.29*     Recent Labs     03/31/22 2003 04/01/22  0204 04/01/22  0523   TROPONINI 0.05* 0.05* 0.04*       Urinalysis:      Lab Results   Component Value Date    NITRU Negative 03/31/2022    WBCUA 0-2 11/05/2013    BACTERIA Rare 11/05/2013    RBCUA 0-2 11/05/2013    BLOODU Negative 03/31/2022    SPECGRAV 1.010 03/31/2022    GLUCOSEU Negative 03/31/2022    GLUCOSEU NEGATIVE 02/29/2012       Radiology:  XR CHEST PORTABLE   Final Result   1. Nonspecific pulmonary opacities in the bilateral lungs can be seen with   congestive heart failure on a background of smoking or atypical/viral   pneumonia.   Similar appearance to prior studies. 2. Calcific atherosclerosis aorta. 3. Cardiomegaly. Assessment/Plan:    Active Hospital Problems    Diagnosis     Acute respiratory failure with hypoxia ([facility]) [J96.01]     Acute on chronic diastolic CHF (congestive heart failure) ([facility]) [I50.33]     Obesity, Class III, BMI 40-49.9 (morbid obesity) ([facility] [E66.01]     HLD (hyperlipidemia) [E78.5]     Essential hypertension [I10]     Coronary artery disease involving native coronary artery of native heart without angina pectoris [I25.10]     DM (diabetes mellitus) ([facility] [E11.9]      PLAN:    Acute on chronic hypoxemic respiratory failure  I believe this is secondary to acute CHF. On arrival patient was requiring oxygen as much as 10 L. Baseline is 4 L. Currently down to baseline after initial intervention. Continue to monitor. Acute on chronic diastolic congestive heart failure  Patient noted that he was gaining weight and feeling short of breath at home. Called cardiologist and increased his diuretic dose, but did not help and had to come to the hospital for admission. Baseline left ventricular ejection fraction is about 50%, borderline low. Lasix switched to IV  Cardiology consulted, currently pending. Acute renal failure on CKD stage III  Baseline creatinine was 1.3, current creatinine is 1.8 and same as yesterday. Fluid retention associated with renal failure could have triggered the acute CHF episode. Nephrology consulted  Continue diuresis for now.     Coronary artery disease  No chest pain. Mild and flat troponin elevation is consistent with demand ischemia combined with poor renal clearance. Cardiology consulted for acute CHF. I any ischemic evaluation will be needed at this time, especially given acute renal failure. Dyslipidemia  Continue atorvastatin at home dose    Diabetes mellitus type 2 with hyperglycemia  Admitted with blood sugar above 200s. Currently blood sugar is more acceptable.   We will continue basal bolus insulin and calorie restrictions. There may be a component of poor adherence to calorie restrictions at home.     Morbid obesity  Body mass index is 43.79 kg/m². Complicating assessment and treatment, placing patient at high risk for multiple co-morbidities as well as early death and contributing to the patient's presentation. Counseled on weight loss.      Discussed with the patient. Questions answered. DVT Prophylaxis: Lovenox  Diet: ADULT DIET; Regular; 5 carb choices (75 gm/meal);  Low Sodium (2 gm)  Code Status: Full Code    PT/OT Eval Status: Ordered    Dispo -inpatient stay, 3 or 4 days anticipated    Renna Opitz, MD

## 2022-04-01 NOTE — PLAN OF CARE
Problem: OXYGENATION/RESPIRATORY FUNCTION  Goal: Patient will maintain patent airway  Outcome: Ongoing     Problem: OXYGENATION/RESPIRATORY FUNCTION  Goal: Patient will achieve/maintain normal respiratory rate/effort  Description: Respiratory rate and effort will be within normal limits for the patient  Outcome: Ongoing    Note: patient will maintain oxygen saturation at or above 90%. Will monitor pt throughout shift. Problem: Metabolic:  Goal: Ability to maintain clinical measurements within normal limits will improve  Description: Ability to maintain clinical measurements within normal limits will improve  Outcome: Ongoing     Note: patient's glucose levels will be monitored and will be monitored for signs and symptoms of hyperglycemia and hypoglycemia. Patient's EF (Ejection Fraction) is greater than 40%    Heart Failure Medications:   Diuretics[de-identified] Furosemide     (One of the following REQUIRED for EF </= 40%/SYSTOLIC FAILURE but MAY be used in EF% >40%/DIASTOLIC FAILURE)        ACE[de-identified] None        ARB[de-identified] None         ARNI[de-identified] None    (Beta Blockers)   NON- Evidenced Based Beta Blocker (for EF% >40%/DIASTOLIC FAILURE): None     Evidenced Based Beta Blocker::(REQUIRED for EF% <40%/SYSTOLIC FAILURE) Carvedilol- Coreg  . .................................................................................................................................................. Patient's weights and intake/output reviewed: Yes    Patient's Last Weight: 296 lbs obtained by standing scale. Difference of 0 lbs more than last documented weight.       Intake/Output Summary (Last 24 hours) at 4/1/2022 8077  Last data filed at 4/1/2022 0421  Gross per 24 hour   Intake --   Output 200 ml   Net -200 ml       Comorbidities Reviewed Yes    Patient has a past medical history of Anemia, Angina, Arthritis, CAD (coronary artery disease), CHF (congestive heart failure) ([address] CKD (chronic kidney disease) stage 3, GFR 30-59 ml/min ([facility] Clostridium difficile diarrhea, Diabetes mellitus ([address] Diabetic neuropathy ([address] Disease of blood and blood forming organ, Dizziness, GERD (gastroesophageal reflux disease), Headache, Hearing loss, Hyperlipidemia, Hypertension, Kidney stone, Refusal of blood transfusions as patient is Caodaism, Sleep apnea, Tinnitus, Venous ulcer ([address] and Wound, open. >>For CHF and Comorbidity documentation on Education Time and Topics, please see Education Tab    Progressive Mobility Assessment:  What is this patient's Current Level of Mobility?: Ambulatory-Up Ad Mami  How was this patient Mobilized today?: Edge of Bed, Up to Chair,  Up to Toilet/Shower and Up in Room, ambulated 5 ft                 With Whom? Nurse, PCA and Self                 Level of Difficulty/Assistance: 1x Assist     Pt up in chair at this time on 4 L O2. Pt with complaints of shortness of breath. Pt with pitting lower extremity edema.      Patient and/or Family's stated Goal of Care this Admission: reduce shortness of breath, increase activity tolerance, better understand heart failure and disease management, be more comfortable and reduce lower extremity edema prior to discharge        :

## 2022-04-01 NOTE — PROGRESS NOTES
4 Eyes Skin Assessment     The patient is being assess for  Admission    I agree that 2 RN's have performed a thorough Head to Toe Skin Assessment on the patient. ALL assessment sites listed below have been assessed. Areas assessed by both nurses: Romeo Amsler  [x]   Head, Face, and Ears   [x]   Shoulders, Back, and Chest  [x]   Arms, Elbows, and Hands   [x]   Coccyx, Sacrum, and IschIum  [x]   Legs, Feet, and Heels        Does the Patient have Skin Breakdown?   Yes LDA WOUND CARE was Initiated documentation include the Radha-wound, Wound Assessment, Measurements, Dressing Treatment, Drainage, and Color\",         John Prevention initiated:  Yes   Wound Care Orders initiated:  Yes      [id number]  nurse consulted for Pressure Injury (Stage 3,4, Unstageable, DTI, NWPT, and Complex wounds), New and Established Ostomies:  Yes    Nurse 1 eSignature: Electronically signed by Senia Correa RN on 4/1/22 at 6:45 AM EDT    **SHARE this note so that the co-signing nurse is able to place an eSignature**    Nurse 2 eSignature: Electronically signed by Ana Wall RN on 4/1/22 at 6:46 AM EDT

## 2022-04-01 NOTE — ED NOTES
Report given to floor RN, patient transported to floor via wheelchair by alina MATTA.      Mindy Santizo RN  03/31/22 0457

## 2022-04-02 LAB
ANION GAP SERPL CALCULATED.3IONS-SCNC: 10 MMOL/L (ref 3–16)
BASOPHILS ABSOLUTE: 0 K/UL (ref 0–0.2)
BASOPHILS RELATIVE PERCENT: 0.3 %
BUN BLDV-MCNC: 78 MG/DL (ref 7–20)
CALCIUM SERPL-MCNC: 9.4 MG/DL (ref 8.3–10.6)
CHLORIDE BLD-SCNC: 91 MMOL/L (ref 99–110)
CO2: 41 MMOL/L (ref 21–32)
CREAT SERPL-MCNC: 1.5 MG/DL (ref 0.8–1.3)
EOSINOPHILS ABSOLUTE: 0.2 K/UL (ref 0–0.6)
EOSINOPHILS RELATIVE PERCENT: 3.5 %
GFR AFRICAN AMERICAN: 56
GFR NON-AFRICAN AMERICAN: 47
GLUCOSE BLD-MCNC: 201 MG/DL (ref 70–99)
GLUCOSE BLD-MCNC: 216 MG/DL (ref 70–99)
GLUCOSE BLD-MCNC: 257 MG/DL (ref 70–99)
GLUCOSE BLD-MCNC: 259 MG/DL (ref 70–99)
GLUCOSE BLD-MCNC: 60 MG/DL (ref 70–99)
GLUCOSE BLD-MCNC: 78 MG/DL (ref 70–99)
HCT VFR BLD CALC: 31.9 % (ref 40.5–52.5)
HEMOGLOBIN: 10.3 G/DL (ref 13.5–17.5)
LYMPHOCYTES ABSOLUTE: 0.6 K/UL (ref 1–5.1)
LYMPHOCYTES RELATIVE PERCENT: 9.6 %
MAGNESIUM: 3 MG/DL (ref 1.8–2.4)
MCH RBC QN AUTO: 28.3 PG (ref 26–34)
MCHC RBC AUTO-ENTMCNC: 32.2 G/DL (ref 31–36)
MCV RBC AUTO: 87.7 FL (ref 80–100)
MONOCYTES ABSOLUTE: 0.5 K/UL (ref 0–1.3)
MONOCYTES RELATIVE PERCENT: 8.1 %
NEUTROPHILS ABSOLUTE: 4.7 K/UL (ref 1.7–7.7)
NEUTROPHILS RELATIVE PERCENT: 78.5 %
PDW BLD-RTO: 19.7 % (ref 12.4–15.4)
PERFORMED ON: ABNORMAL
PERFORMED ON: NORMAL
PLATELET # BLD: 152 K/UL (ref 135–450)
PMV BLD AUTO: 8.9 FL (ref 5–10.5)
POTASSIUM SERPL-SCNC: 3.8 MMOL/L (ref 3.5–5.1)
RBC # BLD: 3.63 M/UL (ref 4.2–5.9)
SODIUM BLD-SCNC: 142 MMOL/L (ref 136–145)
WBC # BLD: 6 K/UL (ref 4–11)

## 2022-04-02 PROCEDURE — 6370000000 HC RX 637 (ALT 250 FOR IP): Performed by: INTERNAL MEDICINE

## 2022-04-02 PROCEDURE — 36415 COLL VENOUS BLD VENIPUNCTURE: CPT

## 2022-04-02 PROCEDURE — 80048 BASIC METABOLIC PNL TOTAL CA: CPT

## 2022-04-02 PROCEDURE — 2580000003 HC RX 258: Performed by: INTERNAL MEDICINE

## 2022-04-02 PROCEDURE — 85025 COMPLETE CBC W/AUTO DIFF WBC: CPT

## 2022-04-02 PROCEDURE — 83735 ASSAY OF MAGNESIUM: CPT

## 2022-04-02 PROCEDURE — 6360000002 HC RX W HCPCS: Performed by: INTERNAL MEDICINE

## 2022-04-02 PROCEDURE — 99232 SBSQ HOSP IP/OBS MODERATE 35: CPT | Performed by: NURSE PRACTITIONER

## 2022-04-02 PROCEDURE — 6370000000 HC RX 637 (ALT 250 FOR IP): Performed by: NURSE PRACTITIONER

## 2022-04-02 PROCEDURE — 2700000000 HC OXYGEN THERAPY PER DAY

## 2022-04-02 PROCEDURE — 1200000000 HC SEMI PRIVATE

## 2022-04-02 PROCEDURE — 94761 N-INVAS EAR/PLS OXIMETRY MLT: CPT

## 2022-04-02 PROCEDURE — 6360000002 HC RX W HCPCS: Performed by: NURSE PRACTITIONER

## 2022-04-02 RX ORDER — TAMSULOSIN HYDROCHLORIDE 0.4 MG/1
0.8 CAPSULE ORAL DAILY
COMMUNITY
Start: 2021-12-30

## 2022-04-02 RX ORDER — LINACLOTIDE 290 UG/1
CAPSULE, GELATIN COATED ORAL
Status: ON HOLD | COMMUNITY
Start: 2021-12-30 | End: 2022-04-09 | Stop reason: HOSPADM

## 2022-04-02 RX ORDER — TAMSULOSIN HYDROCHLORIDE 0.4 MG/1
0.4 CAPSULE ORAL DAILY
Status: DISCONTINUED | OUTPATIENT
Start: 2022-04-02 | End: 2022-04-09 | Stop reason: HOSPADM

## 2022-04-02 RX ADMIN — OXYCODONE AND ACETAMINOPHEN 1 TABLET: 325; 5 TABLET ORAL at 18:12

## 2022-04-02 RX ADMIN — MICONAZOLE NITRATE: 20 CREAM TOPICAL at 18:14

## 2022-04-02 RX ADMIN — PREGABALIN 25 MG: 25 CAPSULE ORAL at 09:07

## 2022-04-02 RX ADMIN — ENOXAPARIN SODIUM 30 MG: 100 INJECTION SUBCUTANEOUS at 20:17

## 2022-04-02 RX ADMIN — FUROSEMIDE 5 MG/HR: 10 INJECTION, SOLUTION INTRAMUSCULAR; INTRAVENOUS at 22:32

## 2022-04-02 RX ADMIN — ENOXAPARIN SODIUM 30 MG: 100 INJECTION SUBCUTANEOUS at 09:07

## 2022-04-02 RX ADMIN — OXYCODONE AND ACETAMINOPHEN 1 TABLET: 325; 5 TABLET ORAL at 20:15

## 2022-04-02 RX ADMIN — SILVER SULFADIAZINE: 10 CREAM TOPICAL at 09:08

## 2022-04-02 RX ADMIN — PREGABALIN 25 MG: 25 CAPSULE ORAL at 20:15

## 2022-04-02 RX ADMIN — FERROUS SULFATE TAB 325 MG (65 MG ELEMENTAL FE) 325 MG: 325 (65 FE) TAB at 12:38

## 2022-04-02 RX ADMIN — FERROUS SULFATE TAB 325 MG (65 MG ELEMENTAL FE) 325 MG: 325 (65 FE) TAB at 09:07

## 2022-04-02 RX ADMIN — ISOSORBIDE MONONITRATE 30 MG: 30 TABLET, EXTENDED RELEASE ORAL at 09:06

## 2022-04-02 RX ADMIN — INSULIN LISPRO 10 UNITS: 100 INJECTION, SOLUTION INTRAVENOUS; SUBCUTANEOUS at 13:16

## 2022-04-02 RX ADMIN — FERROUS SULFATE TAB 325 MG (65 MG ELEMENTAL FE) 325 MG: 325 (65 FE) TAB at 18:12

## 2022-04-02 RX ADMIN — ATORVASTATIN CALCIUM 80 MG: 80 TABLET, FILM COATED ORAL at 20:15

## 2022-04-02 RX ADMIN — OXYCODONE AND ACETAMINOPHEN 1 TABLET: 325; 5 TABLET ORAL at 09:07

## 2022-04-02 RX ADMIN — OXYCODONE AND ACETAMINOPHEN 1 TABLET: 325; 5 TABLET ORAL at 12:38

## 2022-04-02 RX ADMIN — ASPIRIN 81 MG 81 MG: 81 TABLET ORAL at 09:07

## 2022-04-02 RX ADMIN — TAMSULOSIN HYDROCHLORIDE 0.4 MG: 0.4 CAPSULE ORAL at 13:16

## 2022-04-02 RX ADMIN — FUROSEMIDE 5 MG/HR: 10 INJECTION, SOLUTION INTRAMUSCULAR; INTRAVENOUS at 10:06

## 2022-04-02 RX ADMIN — MICONAZOLE NITRATE: 20 CREAM TOPICAL at 20:31

## 2022-04-02 RX ADMIN — INSULIN GLARGINE 80 UNITS: 100 INJECTION, SOLUTION SUBCUTANEOUS at 09:08

## 2022-04-02 RX ADMIN — INSULIN LISPRO 3 UNITS: 100 INJECTION, SOLUTION INTRAVENOUS; SUBCUTANEOUS at 20:20

## 2022-04-02 RX ADMIN — INSULIN LISPRO 6 UNITS: 100 INJECTION, SOLUTION INTRAVENOUS; SUBCUTANEOUS at 12:38

## 2022-04-02 RX ADMIN — INSULIN LISPRO 9 UNITS: 100 INJECTION, SOLUTION INTRAVENOUS; SUBCUTANEOUS at 09:07

## 2022-04-02 RX ADMIN — CARVEDILOL 6.25 MG: 6.25 TABLET, FILM COATED ORAL at 09:07

## 2022-04-02 RX ADMIN — INSULIN GLARGINE 80 UNITS: 100 INJECTION, SOLUTION SUBCUTANEOUS at 20:21

## 2022-04-02 ASSESSMENT — PAIN DESCRIPTION - PAIN TYPE
TYPE: CHRONIC PAIN
TYPE: CHRONIC PAIN

## 2022-04-02 ASSESSMENT — PAIN SCALES - GENERAL
PAINLEVEL_OUTOF10: 7
PAINLEVEL_OUTOF10: 7
PAINLEVEL_OUTOF10: 0
PAINLEVEL_OUTOF10: 8
PAINLEVEL_OUTOF10: 7

## 2022-04-02 ASSESSMENT — PAIN DESCRIPTION - LOCATION
LOCATION: GENERALIZED
LOCATION: GENERALIZED

## 2022-04-02 NOTE — PLAN OF CARE
Problem: OXYGENATION/RESPIRATORY FUNCTION  Goal: Patient will achieve/maintain normal respiratory rate/effort  Description: Respiratory rate and effort will be within normal limits for the patient  Outcome: Ongoing     Problem: HEMODYNAMIC STATUS  Goal: Patient has stable vital signs and fluid balance  Outcome: Ongoing     Patient's EF (Ejection Fraction) is greater than 40%    Heart Failure Medications:  Diuretics[de-identified] Furosemide    (One of the following REQUIRED for EF </= 40%/SYSTOLIC FAILURE but MAY be used in EF% >40%/DIASTOLIC FAILURE)        ACE[de-identified] None        ARB[de-identified] None         ARNI[de-identified] None    (Beta Blockers)  NON- Evidenced Based Beta Blocker (for EF% >40%/DIASTOLIC FAILURE): None    Evidenced Based Beta Blocker::(REQUIRED for EF% <40%/SYSTOLIC FAILURE) Carvedilol- Coreg  . .................................................................................................................................................. Patient's weights and intake/output reviewed: Yes    Patient's Last Weight: 296 lbs obtained by standing scale. Admission weight      Intake/Output Summary (Last 24 hours) at 4/2/2022 0153  Last data filed at 4/1/2022 1343  Gross per 24 hour   Intake 960 ml   Output 200 ml   Net 760 ml       Comorbidities Reviewed Yes    Patient has a past medical history of Anemia, Angina, Arthritis, CAD (coronary artery disease), CHF (congestive heart failure) ([facility] CKD (chronic kidney disease) stage 3, GFR 30-59 ml/min ([facility]), Clostridium difficile diarrhea, Diabetes mellitus ([facility] Diabetic neuropathy ([facility] Disease of blood and blood forming organ, Dizziness, GERD (gastroesophageal reflux disease), Headache, Hearing loss, Hyperlipidemia, Hypertension, Kidney stone, Refusal of blood transfusions as patient is Protestant, Sleep apnea, Tinnitus, Venous ulcer ([facility] and Wound, open.      >>For CHF and Comorbidity documentation on Education Time and Topics, please see Education Tab    Progressive Mobility Assessment:  What is this patient's Current Level of Mobility?: Ambulatory-Up Ad Mami  How was this patient Mobilized today?: Edge of Bed, Up to Chair, and  Up to Toilet/Shower, ambulated 48 ft                 With Whom? Self                 Level of Difficulty/Assistance: Independent     Pt resting in bed at this time on  4 L O2. Pt with complaints of shortness of breath. Pt with pitting lower extremity edema.      Patient and/or Family's stated Goal of Care this Admission: reduce shortness of breath, increase activity tolerance, better understand heart failure and disease management, be more comfortable, and reduce lower extremity edema prior to discharge        Problem: Metabolic:  Goal: Ability to maintain clinical measurements within normal limits will improve  Description: Ability to maintain clinical measurements within normal limits will improve  Outcome: Ongoing   Continuing to monitor blood glucose levels and administer ordered insulin as appropriate.    :

## 2022-04-02 NOTE — PROGRESS NOTES
Pt urinated in toilet without hat, reinforced education with pt on need for measuring output, hat placed in toilet.

## 2022-04-02 NOTE — PLAN OF CARE
Problem: Metabolic:  Goal: Ability to maintain clinical measurements within normal limits will improve  Description: Ability to maintain clinical measurements within normal limits will improve  4/2/2022 1516 by Suad Pérez RN  Outcome: Ongoing     Patient's EF (Ejection Fraction) is greater than 40%    Heart Failure Medications:  Diuretics[de-identified] Furosemide    (One of the following REQUIRED for EF </= 40%/SYSTOLIC FAILURE but MAY be used in EF% >40%/DIASTOLIC FAILURE)        ACE[de-identified] None        ARB[de-identified] None         ARNI[de-identified] None    (Beta Blockers)  NON- Evidenced Based Beta Blocker (for EF% >40%/DIASTOLIC FAILURE): None    Evidenced Based Beta Blocker::(REQUIRED for EF% <40%/SYSTOLIC FAILURE) Carvedilol- Coreg  . .................................................................................................................................................. Patient's weights and intake/output reviewed: Yes    Patient's Last Weight: 292 lbs obtained by standing scale. Difference of 4 lbs less than last documented weight. Intake/Output Summary (Last 24 hours) at 4/2/2022 1517  Last data filed at 4/2/2022 1422  Gross per 24 hour   Intake 1080 ml   Output 1950 ml   Net -870 ml       Comorbidities Reviewed Yes    Patient has a past medical history of Anemia, Angina, Arthritis, CAD (coronary artery disease), CHF (congestive heart failure) ([facility] [address] CKD (chronic kidney disease) stage 3, GFR 30-59 ml/min ([facility]), Clostridium difficile diarrhea, Diabetes mellitus ([facility] [address] Diabetic neuropathy ([facility] [address] Disease of blood and blood forming organ, Dizziness, GERD (gastroesophageal reflux disease), Headache, Hearing loss, Hyperlipidemia, Hypertension, Kidney stone, Refusal of blood transfusions as patient is Christianity, Sleep apnea, Tinnitus, Venous ulcer ([facility] [address] and Wound, open.      >>For CHF and Comorbidity documentation on Education Time and Topics, please see Education Tab    Progressive Mobility Assessment:  What is this patient's Current Level of Mobility?: Ambulatory- with Assistance  How was this patient Mobilized today?: Edge of Bed, Up to Chair,  Up to Toilet/Shower, and Up in Room, ambulated 15 ft                 With Whom? Nurse and PCA                 Level of Difficulty/Assistance: 1x Assist     Pt resting in bed at this time on  4 L O2. Pt with complaints of shortness of breath. Pt with pitting lower extremity edema.      Patient and/or Family's stated Goal of Care this Admission: reduce shortness of breath, increase activity tolerance, better understand heart failure and disease management, be more comfortable, and reduce lower extremity edema prior to discharge        :

## 2022-04-02 NOTE — PROGRESS NOTES
Report given to [facility] MADHU. Call light and bedside table within reach. No needs voiced at this time. Bed in lowest position, brakes locked. Pt continues to refuse bed alarm.

## 2022-04-02 NOTE — PROGRESS NOTES
Pt with skin tear to toe on left foot that continues to bleed after being wrapped with gauze, removed dressing and cleaned with saline then applied abd pad and wrapped with coban to apply pressure. Emphasized to pt the importance of calling out for assistance from staff before getting up to avoid slipping/falls.  Pt verbalized understanding, will monitor

## 2022-04-02 NOTE — PROGRESS NOTES
End of shift report given to [facility]. Call light within reach, bed in lowest position, no needs at this time.

## 2022-04-02 NOTE — PLAN OF CARE
Problem: Skin Integrity:  Goal: Absence of new skin breakdown  Description: Absence of new skin breakdown  Outcome: Ongoing   Pt remains absent from new skin breakdown during this shift. Pt educated on prevention measures.

## 2022-04-02 NOTE — PROGRESS NOTES
[address]   Daily Progress Note    Admit Date:  3/31/2022  HPI:    Chief Complaint   Patient presents with    Shortness of Breath     medics report shortness of breath, pt on home oxygen 4 liters, pt placed on non rebreather. pt wheezing. pt given douneb. 10 liters  bs 268    Leg Problem     pt seeing wound care for left leg wheeping. pt reports this was tuesday. Bhanu Boys resented with worsening shortness of breath. Hx of dCHF, CMP, CAD s/p CABG 2010, moderate AS, HTN, HLD as well as CKD, DM, HENNA, obesity. Subjective:  Mr. Oliverio Oakes sitting up on side of bed, talkative. Concerned that home diuretic regimen did not work for him. He is considering assisted living to help with meals and other needs. Objective:   Patient Vitals for the past 24 hrs:   BP Temp Temp src Pulse Resp SpO2 Weight   04/02/22 1209 (!) 102/54 98.2 °F (36.8 °C) Oral 58 16 91 % --   04/02/22 0905 (!) 123/58 98.3 °F (36.8 °C) Oral 67 22 98 % --   04/02/22 0332 122/77 97.8 °F (36.6 °C) Oral 59 18 93 % --   04/02/22 0329 -- -- -- -- -- -- 292 lb (132.5 kg)   04/01/22 2352 120/62 97.6 °F (36.4 °C) Oral 62 20 93 % --   04/01/22 2004 (!) 140/79 97.7 °F (36.5 °C) Oral 63 22 96 % --   04/01/22 1700 (!) 143/67 98.1 °F (36.7 °C) Oral 72 18 93 % --       Intake/Output Summary (Last 24 hours) at 4/2/2022 1316  Last data filed at 4/2/2022 0920  Gross per 24 hour   Intake 1680 ml   Output 1850 ml   Net -170 ml     Wt Readings from Last 3 Encounters:   04/02/22 292 lb (132.5 kg)   03/14/22 282 lb 9.6 oz (128.2 kg)   02/10/22 280 lb (127 kg)         ASSESSMENT:   1. Shortness of breath: 2/2 CHF  2. Acute on chronic diastolic CHF: weight down 4 lbs, I/O even, symptoms mildly improved  3. CAD: Prior CABG 2010, C in 2012 with patent grafts, mixed ischemia and scar on stress test March 2022 - medical management  4. Moderate aortic stenosis:   5. Elevated troponin: likely supply/ demand mismatch  6. CKD: improved  7.  HLD: LDL 65, on statin  8. Diabetes mellitus: A1c 7.0  9. HENNA:   10. Anemia: stable  11. Noncompliance: has not followed up in office, non-adherent to diet/ fluids      PLAN:  1. Continue IV Lasix infusion for diuresis  2. Continue Imdur, Coreg, asa and statin  3. Daily labs and weights  4.  Will need to determine home diuretic regimen    Belinda Leach, PATRICK - CNP, 4/2/2022, 1:16 PM  [facility]   [phone]       Telemetry: SR 50-60's  NYHA: III    Physical Exam:  General:  Awake, alert, NAD  Skin:  Warm and dry  Neck:  JVP 10 cm  Chest:  Clear to auscultation though diminished  Cardiovascular:  RRR, normal S1S2, 2/6 JEMIMA, no g/r  Abdomen:  Soft, nontender, +bowel sounds  Extremities:  2+ BLE edema with wounds        Medications:    insulin lispro  10 Units SubCUTAneous TID WC    tamsulosin  0.4 mg Oral Daily    miconazole   Topical BID    aspirin  81 mg Oral Daily    atorvastatin  80 mg Oral Nightly    carvedilol  6.25 mg Oral BID WC    ferrous sulfate  325 mg Oral TID WC    insulin glargine  80 Units SubCUTAneous BID    isosorbide mononitrate  30 mg Oral Daily    oxyCODONE-acetaminophen  1 tablet Oral 4x Daily    silver sulfADIAZINE   Topical Daily    pregabalin  25 mg Oral BID    sodium chloride flush  5-40 mL IntraVENous 2 times per day    enoxaparin  30 mg SubCUTAneous BID    insulin lispro  0-18 Units SubCUTAneous TID WC    insulin lispro  0-9 Units SubCUTAneous Nightly      sodium chloride      dextrose      furosemide (LASIX) 1mg/ml infusion 5 mg/hr (04/02/22 1006)       Lab Data: Lab results independently reviewed and analyzed by myself 4/2/2022    CBC:   Recent Labs     03/31/22 2003 04/01/22  0523 04/02/22  0745   WBC 7.6 7.7 6.0   HGB 10.0* 10.3* 10.3*    152 152     BMP:    Recent Labs     03/31/22 2003 04/01/22  0523 04/02/22  0745   * 139 142   K 3.4* 3.5 3.8   CO2 39* 40* 41*   * 96* 78*   CREATININE 1.8* 1.8* 1.5*     INR:    Recent Labs 60%, uniform wall motion. CORONARY ANGIOGRAM:   1. The right coronary artery proximally has an 80% lesion. Distally there is a 90% lesion. 2. The left main artery has luminal irregularities. The LAD is occluded proximally. 3. The circumflex artery has 2 large marginal arteries that have diffuse 40% lesion. 4. The KAVYA to the distal LAD is widely patent. 5. The SVG to the first diagonal is widely patent. 6. The SVG to the RPL is widely patent. IMPRESSION:   1. Normal left ventricular function with normal cardiac output. 2. Severely elevated left ventricular end diastolic pressure. 3. Normal cardiac output. 4. Severe pulmonary venous hypertension.

## 2022-04-02 NOTE — PLAN OF CARE
Problem: Nutritional:  Goal: Maintenance of adequate nutrition will improve  Description: Maintenance of adequate nutrition will improve  Outcome: Ongoing     Problem: OXYGENATION/RESPIRATORY FUNCTION  Goal: Patient will maintain patent airway  Outcome: Ongoing   Pt educated on the importance of a carb control diet. Monitoring pt's blood glucose AC/HS. Sliding scale insulin given as ordered according to pt's blood glucose. Will continue to monitor.

## 2022-04-02 NOTE — PROGRESS NOTES
Pt refusing bed alarm. Pt educated on importance of calling staff for help prior to ambulating in the room. Pt verbalizes understanding.

## 2022-04-02 NOTE — PROGRESS NOTES
Pt's blood glucose 60 before dinner. Pt given to orange juices. Pt's blood glucose 78 after. Pt eating dinner. Humalog held.

## 2022-04-02 NOTE — PROGRESS NOTES
Hospitalist Progress Note      PCP: Marce Daugherty, APRN - CNP    Date of Admission: 3/31/2022    Chief Complaint: Shortness of breath, edema    Hospital Course: Patient with previously known congestive heart failure presented to emergency department due to worsening fluid retention shortness of breath and edema despite increasing his diuretic dose per cardiology recommendations. Started on IV diuretics. Also noted creatinine is elevated. Evaluated by cardiology and nephrology. Diuretics adjusted and switched to Lasix drip. Noted increased diuresis. Patient feels subjectively better    Subjective: No chest pain, has shortness of breath and lower extremity edema which are improving. .  No abdominal pain, no nausea or vomiting.       Medications:  Reviewed    Infusion Medications    sodium chloride      dextrose      furosemide (LASIX) 1mg/ml infusion 5 mg/hr (04/02/22 1006)     Scheduled Medications    aspirin  81 mg Oral Daily    atorvastatin  80 mg Oral Nightly    carvedilol  6.25 mg Oral BID WC    ferrous sulfate  325 mg Oral TID WC    insulin glargine  80 Units SubCUTAneous BID    isosorbide mononitrate  30 mg Oral Daily    oxyCODONE-acetaminophen  1 tablet Oral 4x Daily    silver sulfADIAZINE   Topical Daily    pregabalin  25 mg Oral BID    sodium chloride flush  5-40 mL IntraVENous 2 times per day    enoxaparin  30 mg SubCUTAneous BID    insulin lispro  0-18 Units SubCUTAneous TID WC    insulin lispro  0-9 Units SubCUTAneous Nightly     PRN Meds: sodium chloride flush, sodium chloride, ondansetron **OR** ondansetron, polyethylene glycol, acetaminophen **OR** acetaminophen, glucose, glucagon (rDNA), dextrose, dextrose bolus (hypoglycemia) **OR** dextrose bolus (hypoglycemia)      Intake/Output Summary (Last 24 hours) at 4/2/2022 1247  Last data filed at 4/2/2022 0920  Gross per 24 hour   Intake 1680 ml   Output 1850 ml   Net -170 ml       Physical Exam Performed:    BP (!) 102/54 Pulse 58   Temp 98.2 °F (36.8 °C) (Oral)   Resp 16   Ht 5' 9\" (1.753 m)   Wt 292 lb (132.5 kg)   SpO2 91%   BMI 43.12 kg/m²     General appearance: No apparent distress, appears stated age and cooperative. HEENT: Pupils equal, round, and reactive to light. Conjunctivae/corneas clear. Neck: Supple, with full range of motion. No jugular venous distention. Trachea midline. Respiratory:  Normal respiratory effort. Bibasilar posterior inspiratory crackles. Cardiovascular: Regular rate and rhythm with normal S1/S2 without murmurs, rubs or gallops. Abdomen: Soft, non-tender, non-distended with normal bowel sounds. Musculoskeletal: No clubbing or cyanosis. 2-3+ lower extremity edema bilaterally. Full range of motion without deformity. Skin: Skin color, texture, turgor normal.  No rashes or lesions. Neurologic:  Neurovascularly intact without any focal sensory/motor deficits. Cranial nerves: II-XII intact, grossly non-focal.  Psychiatric: Alert and oriented, thought content appropriate, normal insight  Capillary Refill: Brisk,3 seconds, normal   Peripheral Pulses: +2 palpable, equal bilaterally     I examined the patient today (04/02/22). Physical exam is not significantly different compared to yesterday (4/1). Noted body weight has come down.     Labs:   Recent Labs     03/31/22 2003 04/01/22  0523 04/02/22  0745   WBC 7.6 7.7 6.0   HGB 10.0* 10.3* 10.3*   HCT 31.0* 31.9* 31.9*    152 152     Recent Labs     03/31/22 2003 04/01/22  0523 04/02/22  0745   * 139 142   K 3.4* 3.5 3.8   CL 86* 89* 91*   CO2 39* 40* 41*   * 96* 78*   CREATININE 1.8* 1.8* 1.5*   CALCIUM 9.0 9.5 9.4     Recent Labs     03/31/22 2003   AST 15   ALT 9*   BILITOT 0.4   ALKPHOS 92     Recent Labs     03/31/22 2003   INR 1.29*     Recent Labs     03/31/22 2003 04/01/22  0204 04/01/22  0523   TROPONINI 0.05* 0.05* 0.04*       Urinalysis:      Lab Results   Component Value Date    NITRU Negative 03/31/2022 WBCUA 0-2 11/05/2013    BACTERIA Rare 11/05/2013    RBCUA 0-2 11/05/2013    BLOODU Negative 03/31/2022    SPECGRAV 1.010 03/31/2022    GLUCOSEU Negative 03/31/2022    GLUCOSEU NEGATIVE 02/29/2012       Radiology:  XR CHEST PORTABLE   Final Result   1. Nonspecific pulmonary opacities in the bilateral lungs can be seen with   congestive heart failure on a background of smoking or atypical/viral   pneumonia. Similar appearance to prior studies. 2. Calcific atherosclerosis aorta. 3. Cardiomegaly. Assessment/Plan:    Active Hospital Problems    Diagnosis     Acute respiratory failure with hypoxia ([facility]) [J96.01]     Acute on chronic diastolic CHF (congestive heart failure) ([facility]) [I50.33]     Obesity, Class III, BMI 40-49.9 (morbid obesity) ([facility] [address] [E66.01]     HLD (hyperlipidemia) [E78.5]     Essential hypertension [I10]     Coronary artery disease involving native coronary artery of native heart without angina pectoris [I25.10]     DM [facility]diabetes mellitus) [facility] [E11.9]      PLAN:    Acute on chronic hypoxemic respiratory failure  I believe this is secondary to acute CHF. On arrival patient was requiring oxygen as much as 10 L. Baseline is 4 L. Currently down to baseline after initial intervention. Continue diuresis, continue to monitor. No changes in management. Stabilized    Acute on chronic diastolic congestive heart failure  Patient noted that he was gaining weight and feeling short of breath at home. Called cardiologist and increased his diuretic dose, but did not help and had to come to the hospital for admission. Baseline left ventricular ejection fraction is about 50%, borderline low. Currently on Lasix drip  Seen by cardiology  Cardiologist has noted that patient had been noncompliant with follow-up. Acute renal failure on CKD stage III  Peak creatinine 1.8, baseline creatinine 1.3. Today's creatinine is 1.5.   Diuresis is better after switch to Lasix drip  Nephrology following. Input appreciated     Coronary artery disease  No chest pain. Mild and flat troponin elevation is consistent with demand ischemia combined with poor renal clearance. Evaluated by cardiology for acute CHF. I doubt any ischemic evaluation will be needed at this time, especially given acute renal failure. Dyslipidemia  Continue atorvastatin at home dose. Tolerating well. Diabetes mellitus type 2 with hyperglycemia  Blood sugar remains above 200s. At home dose of long-acting insulin. I will add scheduled preprandial short-acting insulin.     Morbid obesity  Body mass index is 43.12 kg/m². Complicating assessment and treatment, placing patient at high risk for multiple co-morbidities as well as early death and contributing to the patient's presentation. Counseled on weight loss.      Discussed with the patient. Questions answered. DVT Prophylaxis: Lovenox  Diet: ADULT DIET; Regular; 5 carb choices (75 gm/meal);  Low Sodium (2 gm)  Code Status: Full Code    PT/OT Eval Status: Ordered    Dispo -inpatient stay, probably 2 or 3 more days    Dixie Armenta MD

## 2022-04-03 LAB
ANION GAP SERPL CALCULATED.3IONS-SCNC: 11 MMOL/L (ref 3–16)
BUN BLDV-MCNC: 74 MG/DL (ref 7–20)
CALCIUM SERPL-MCNC: 9.5 MG/DL (ref 8.3–10.6)
CHLORIDE BLD-SCNC: 89 MMOL/L (ref 99–110)
CO2: 39 MMOL/L (ref 21–32)
CREAT SERPL-MCNC: 1.5 MG/DL (ref 0.8–1.3)
GFR AFRICAN AMERICAN: 56
GFR NON-AFRICAN AMERICAN: 47
GLUCOSE BLD-MCNC: 115 MG/DL (ref 70–99)
GLUCOSE BLD-MCNC: 120 MG/DL (ref 70–99)
GLUCOSE BLD-MCNC: 331 MG/DL (ref 70–99)
GLUCOSE BLD-MCNC: 60 MG/DL (ref 70–99)
GLUCOSE BLD-MCNC: 70 MG/DL (ref 70–99)
GLUCOSE BLD-MCNC: 74 MG/DL (ref 70–99)
GLUCOSE BLD-MCNC: 94 MG/DL (ref 70–99)
HCT VFR BLD CALC: 33.9 % (ref 40.5–52.5)
HEMOGLOBIN: 10.9 G/DL (ref 13.5–17.5)
MAGNESIUM: 3.2 MG/DL (ref 1.8–2.4)
MCH RBC QN AUTO: 28.3 PG (ref 26–34)
MCHC RBC AUTO-ENTMCNC: 32.1 G/DL (ref 31–36)
MCV RBC AUTO: 88.2 FL (ref 80–100)
PDW BLD-RTO: 20.4 % (ref 12.4–15.4)
PERFORMED ON: ABNORMAL
PERFORMED ON: NORMAL
PERFORMED ON: NORMAL
PLATELET # BLD: 166 K/UL (ref 135–450)
PMV BLD AUTO: 8.6 FL (ref 5–10.5)
POTASSIUM SERPL-SCNC: 3.8 MMOL/L (ref 3.5–5.1)
PRO-BNP: 1899 PG/ML (ref 0–124)
RBC # BLD: 3.85 M/UL (ref 4.2–5.9)
SODIUM BLD-SCNC: 139 MMOL/L (ref 136–145)
WBC # BLD: 5.4 K/UL (ref 4–11)

## 2022-04-03 PROCEDURE — 6370000000 HC RX 637 (ALT 250 FOR IP): Performed by: INTERNAL MEDICINE

## 2022-04-03 PROCEDURE — 6360000002 HC RX W HCPCS: Performed by: INTERNAL MEDICINE

## 2022-04-03 PROCEDURE — 6370000000 HC RX 637 (ALT 250 FOR IP): Performed by: NURSE PRACTITIONER

## 2022-04-03 PROCEDURE — 6360000002 HC RX W HCPCS: Performed by: NURSE PRACTITIONER

## 2022-04-03 PROCEDURE — 94761 N-INVAS EAR/PLS OXIMETRY MLT: CPT

## 2022-04-03 PROCEDURE — 1200000000 HC SEMI PRIVATE

## 2022-04-03 PROCEDURE — 2580000003 HC RX 258: Performed by: INTERNAL MEDICINE

## 2022-04-03 PROCEDURE — 83880 ASSAY OF NATRIURETIC PEPTIDE: CPT

## 2022-04-03 PROCEDURE — 80048 BASIC METABOLIC PNL TOTAL CA: CPT

## 2022-04-03 PROCEDURE — 99233 SBSQ HOSP IP/OBS HIGH 50: CPT | Performed by: NURSE PRACTITIONER

## 2022-04-03 PROCEDURE — 2700000000 HC OXYGEN THERAPY PER DAY

## 2022-04-03 PROCEDURE — 85027 COMPLETE CBC AUTOMATED: CPT

## 2022-04-03 PROCEDURE — 83735 ASSAY OF MAGNESIUM: CPT

## 2022-04-03 PROCEDURE — 36415 COLL VENOUS BLD VENIPUNCTURE: CPT

## 2022-04-03 RX ORDER — POTASSIUM CHLORIDE 750 MG/1
10 TABLET, EXTENDED RELEASE ORAL 2 TIMES DAILY
Status: DISCONTINUED | OUTPATIENT
Start: 2022-04-03 | End: 2022-04-05

## 2022-04-03 RX ORDER — OXYCODONE HYDROCHLORIDE 5 MG/1
5 TABLET ORAL ONCE
Status: COMPLETED | OUTPATIENT
Start: 2022-04-03 | End: 2022-04-03

## 2022-04-03 RX ADMIN — FUROSEMIDE 5 MG/HR: 10 INJECTION, SOLUTION INTRAMUSCULAR; INTRAVENOUS at 13:10

## 2022-04-03 RX ADMIN — SILVER SULFADIAZINE: 10 CREAM TOPICAL at 13:11

## 2022-04-03 RX ADMIN — INSULIN GLARGINE 80 UNITS: 100 INJECTION, SOLUTION SUBCUTANEOUS at 21:06

## 2022-04-03 RX ADMIN — POLYETHYLENE GLYCOL 3350 17 G: 17 POWDER, FOR SOLUTION ORAL at 09:02

## 2022-04-03 RX ADMIN — PREGABALIN 25 MG: 25 CAPSULE ORAL at 21:05

## 2022-04-03 RX ADMIN — INSULIN GLARGINE 80 UNITS: 100 INJECTION, SOLUTION SUBCUTANEOUS at 09:06

## 2022-04-03 RX ADMIN — ISOSORBIDE MONONITRATE 30 MG: 30 TABLET, EXTENDED RELEASE ORAL at 09:02

## 2022-04-03 RX ADMIN — TAMSULOSIN HYDROCHLORIDE 0.4 MG: 0.4 CAPSULE ORAL at 06:56

## 2022-04-03 RX ADMIN — FERROUS SULFATE TAB 325 MG (65 MG ELEMENTAL FE) 325 MG: 325 (65 FE) TAB at 09:02

## 2022-04-03 RX ADMIN — OXYCODONE HYDROCHLORIDE 5 MG: 5 TABLET ORAL at 06:56

## 2022-04-03 RX ADMIN — OXYCODONE AND ACETAMINOPHEN 1 TABLET: 325; 5 TABLET ORAL at 21:05

## 2022-04-03 RX ADMIN — ASPIRIN 81 MG 81 MG: 81 TABLET ORAL at 09:02

## 2022-04-03 RX ADMIN — ENOXAPARIN SODIUM 30 MG: 100 INJECTION SUBCUTANEOUS at 09:02

## 2022-04-03 RX ADMIN — INSULIN LISPRO 10 UNITS: 100 INJECTION, SOLUTION INTRAVENOUS; SUBCUTANEOUS at 09:06

## 2022-04-03 RX ADMIN — ATORVASTATIN CALCIUM 80 MG: 80 TABLET, FILM COATED ORAL at 21:05

## 2022-04-03 RX ADMIN — INSULIN LISPRO 12 UNITS: 100 INJECTION, SOLUTION INTRAVENOUS; SUBCUTANEOUS at 09:06

## 2022-04-03 RX ADMIN — CARVEDILOL 6.25 MG: 6.25 TABLET, FILM COATED ORAL at 09:02

## 2022-04-03 RX ADMIN — SODIUM CHLORIDE 200 MG: 9 INJECTION, SOLUTION INTRAVENOUS at 15:04

## 2022-04-03 RX ADMIN — MICONAZOLE NITRATE: 20 CREAM TOPICAL at 09:03

## 2022-04-03 RX ADMIN — ENOXAPARIN SODIUM 30 MG: 100 INJECTION SUBCUTANEOUS at 21:05

## 2022-04-03 RX ADMIN — POTASSIUM CHLORIDE 10 MEQ: 10 TABLET, EXTENDED RELEASE ORAL at 21:05

## 2022-04-03 RX ADMIN — POTASSIUM CHLORIDE 10 MEQ: 10 TABLET, EXTENDED RELEASE ORAL at 18:03

## 2022-04-03 ASSESSMENT — PAIN SCALES - GENERAL
PAINLEVEL_OUTOF10: 8

## 2022-04-03 ASSESSMENT — PAIN DESCRIPTION - LOCATION: LOCATION: BACK;SHOULDER;GENERALIZED

## 2022-04-03 ASSESSMENT — PAIN DESCRIPTION - PAIN TYPE: TYPE: CHRONIC PAIN

## 2022-04-03 NOTE — PROGRESS NOTES
[address]   Daily Progress Note    Admit Date:  3/31/2022  HPI:    Chief Complaint   Patient presents with    Shortness of Breath     medics report shortness of breath, pt on home oxygen 4 liters, pt placed on non rebreather. pt wheezing. pt given douneb. 10 liters  bs 268    Leg Problem     pt seeing wound care for left leg wheeping. pt reports this was tuesday. Jose Reynolds resented with worsening shortness of breath. Hx of dCHF, CMP, CAD s/p CABG 2010, moderate AS, HTN, HLD as well as CKD, DM, HENNA, obesity. Subjective:  Mr. Irma Hennessy sitting up on side of bed, legs dangling down. States feels a bit better today but frustrated with no \"food\" after dinner and blood sugars running low. Has visual disturbance today with hypoglycemia. Having nosebleed from left nare. Objective:   Patient Vitals for the past 24 hrs:   BP Temp Temp src Pulse Resp SpO2 Weight   04/03/22 1308 124/63 97.7 °F (36.5 °C) Oral 62 16 94 % --   04/03/22 0834 135/73 98 °F (36.7 °C) Oral 68 18 90 % --   04/03/22 0326 127/63 98.2 °F (36.8 °C) Oral 87 18 90 % 293 lb (132.9 kg)   04/02/22 2345 120/64 -- -- -- -- -- --   04/02/22 2329 -- 98.6 °F (37 °C) Oral 69 20 92 % --   04/02/22 2005 (!) 132/57 98 °F (36.7 °C) Oral 70 20 92 % --   04/02/22 1609 (!) 96/56 98.1 °F (36.7 °C) Oral 71 16 99 % --       Intake/Output Summary (Last 24 hours) at 4/3/2022 1355  Last data filed at 4/3/2022 1309  Gross per 24 hour   Intake 1560 ml   Output 3000 ml   Net -1440 ml     Wt Readings from Last 3 Encounters:   04/03/22 293 lb (132.9 kg)   03/14/22 282 lb 9.6 oz (128.2 kg)   02/10/22 280 lb (127 kg)         ASSESSMENT:   1. Shortness of breath: 2/2 CHF  2. Acute on chronic diastolic CHF: weight up1 lbs, net -1L, symptoms mildly improved  3. CAD: Prior CABG 2010, [facility] in 2012 with patent grafts, mixed ischemia and scar on stress test March 2022 - medical management  4. Moderate aortic stenosis:   5.  Elevated troponin: likely supply/ demand mismatch  6. CKD: improved  7. HLD: LDL 65, on statin  8. Diabetes mellitus: A1c 7.0  9. HENNA:   10. Anemia: stable  11. Noncompliance: has not followed up in office, non-adherent to diet/ fluids      PLAN:  1. Increase IV Lasix to 10 mg/hr  2. Continue Imdur, Coreg, asa and statin  3. Daily labs and weights  4.  Will need to determine home diuretic regimen    Worth Leyden, PATRICK - GILBERTO, 4/3/2022, 1:55 PM  [address]   [phone]       Telemetry: SR 50-60's  NYHA: III    Physical Exam:  General:  Awake, alert, NAD  Skin:  Warm and dry  Neck:  JVP 10 cm  Chest:  Clear to auscultation though diminished  Cardiovascular:  RRR, normal S1S2, 2/6 JEMIMA, no g/r  Abdomen:  Soft, nontender, +bowel sounds  Extremities:  2+ BLE edema with wounds        Medications:    linaclotide  290 mcg Oral QAM AC    iron sucrose (VENOFER) iv piggyback 100 mL (Admin over 60 minutes)  200 mg IntraVENous Q24H    insulin lispro  10 Units SubCUTAneous TID WC    tamsulosin  0.4 mg Oral Daily    miconazole   Topical BID    aspirin  81 mg Oral Daily    atorvastatin  80 mg Oral Nightly    carvedilol  6.25 mg Oral BID WC    [Held by provider] ferrous sulfate  325 mg Oral TID WC    insulin glargine  80 Units SubCUTAneous BID    isosorbide mononitrate  30 mg Oral Daily    oxyCODONE-acetaminophen  1 tablet Oral 4x Daily    silver sulfADIAZINE   Topical Daily    pregabalin  25 mg Oral BID    sodium chloride flush  5-40 mL IntraVENous 2 times per day    enoxaparin  30 mg SubCUTAneous BID    insulin lispro  0-18 Units SubCUTAneous TID WC    insulin lispro  0-9 Units SubCUTAneous Nightly      sodium chloride      dextrose      furosemide (LASIX) 1mg/ml infusion 5 mg/hr (04/03/22 1310)       Lab Data: Lab results independently reviewed and analyzed by myself 4/3/2022    CBC:   Recent Labs     04/01/22  0523 04/02/22  0745 04/03/22  0844   WBC 7.7 6.0 5.4   HGB 10.3* 10.3* 10.9*    152 166     BMP: Recent Labs     04/01/22  0523 04/02/22  0745 04/03/22  0844    142 139   K 3.5 3.8 3.8   CO2 40* 41* 39*   BUN 96* 78* 74*   CREATININE 1.8* 1.5* 1.5*     INR:    Recent Labs     03/31/22 2003   INR 1.29*     BNP:    Recent Labs     03/31/22 2003 04/03/22  0844   PROBNP 2,358* 1,899*     Cardiac Enzymes:   Recent Labs     03/31/22 2003 04/01/22  0204 04/01/22  0523   TROPONINI 0.05* 0.05* 0.04*     Lipids:   Lab Results   Component Value Date    TRIG 92 04/01/2022    TRIG 110 08/24/2021    HDL 30 04/01/2022    HDL 33 08/24/2021    HDL 31 01/11/2012    HDL 27 07/27/2010    LDLCALC 65 04/01/2022    LDLCALC 49 08/24/2021       Cardiac Imaging:    Stress Test: 3/10/22  Summary    Abnormal moderate risk myocardial perfusion study. There is a large area of mixed ischemia and scar within the infero-apical,    apical-lateral, lateral, posterior-lateral, and posterior-basal segments. The left ventricular size is moderately dilated. The estimated left ventricular function is 67%. Echo: 1/13/22   Summary   Limited only f/u for LVEF and aortic valve stenosis. Technically difficult examination. Low normal left ventricular systolic function with ejection fraction of 50%. Moderate aortic stenosis. Aortic stenosis values appear approximately same when compared to echo on   8-. Mild to moderate aortic regurgitation. ECHO: 9/20/16  Left ventricular systolic function is normal with ejection fraction   estimated at 55 %. Diastolic septal flattening consistent with right ventricular overload. Diastolic filling parameters suggests grade II diastolic dysfunction. Mild aortic stenosis. Right ventricle is mildly enlarged. Right ventricular systolic function is normal, TAPSE 19 mm. Inadequate tricuspid regurgitation jet to estimate systolic pulmonary artery   pressure (SPAP). Cardiac Cath Jan 2012:   HEMODYNAMICS: RA pressure mean of 21. RV pressure is 70/26.  PA pressure is 73/27. The LV pressure is 135, EDP of 8. AO pressure is 140/60. The Anh cardiac output was 6.16 L/min. The pulmonary _____ 165 _____ per second. The left ventricular function is preserved. EF is 55% to 60%, uniform wall motion. CORONARY ANGIOGRAM:   1. The right coronary artery proximally has an 80% lesion. Distally there is a 90% lesion. 2. The left main artery has luminal irregularities. The LAD is occluded proximally. 3. The circumflex artery has 2 large marginal arteries that have diffuse 40% lesion. 4. The KAVYA to the distal LAD is widely patent. 5. The SVG to the first diagonal is widely patent. 6. The SVG to the RPL is widely patent. IMPRESSION:   1. Normal left ventricular function with normal cardiac output. 2. Severely elevated left ventricular end diastolic pressure. 3. Normal cardiac output. 4. Severe pulmonary venous hypertension.

## 2022-04-03 NOTE — PROGRESS NOTES
End of shift report given to [facility]. Call light within reach, bed in lowest position, no needs at this time.

## 2022-04-03 NOTE — PLAN OF CARE
Problem: Health Behavior:  Goal: Amount of time patient spends in regular exercise will increase  Description: Amount of time patient spends in regular exercise will increase  Outcome: Ongoing   Pt educated on the importance of a carb control diet. Monitoring pt's blood glucose AC/HS. Sliding scale insulin given as ordered according to pt's blood glucose. Will continue to monitor.

## 2022-04-03 NOTE — PLAN OF CARE
Problem: HEMODYNAMIC STATUS  Goal: Patient has stable vital signs and fluid balance  Outcome: Ongoing     Patient's EF (Ejection Fraction) is greater than 40%    Heart Failure Medications:  Diuretics[de-identified] Furosemide    (One of the following REQUIRED for EF </= 40%/SYSTOLIC FAILURE but MAY be used in EF% >40%/DIASTOLIC FAILURE)        ACE[de-identified] None        ARB[de-identified] None         ARNI[de-identified] None    (Beta Blockers)  NON- Evidenced Based Beta Blocker (for EF% >40%/DIASTOLIC FAILURE): None    Evidenced Based Beta Blocker::(REQUIRED for EF% <40%/SYSTOLIC FAILURE) Carvedilol- Coreg  . .................................................................................................................................................. Patient's weights and intake/output reviewed: Yes    Patient's Last Weight: 293 lbs obtained by standing scale. Difference of 1 lbs more than last documented weight. Intake/Output Summary (Last 24 hours) at 4/3/2022 1816  Last data filed at 4/3/2022 1740  Gross per 24 hour   Intake 1440 ml   Output 3100 ml   Net -1660 ml       Comorbidities Reviewed Yes    Patient has a past medical history of Anemia, Angina, Arthritis, CAD (coronary artery disease), CHF (congestive heart failure) ([facility] CKD (chronic kidney disease) stage 3, GFR 30-59 ml/min ([facility]), Clostridium difficile diarrhea, Diabetes mellitus ([facility] Diabetic neuropathy ([facility] Disease of blood and blood forming organ, Dizziness, GERD (gastroesophageal reflux disease), Headache, Hearing loss, Hyperlipidemia, Hypertension, Kidney stone, Refusal of blood transfusions as patient is Confucianist, Sleep apnea, Tinnitus, Venous ulcer ([address] and Wound, open.      >>For CHF and Comorbidity documentation on Education Time and Topics, please see Education Tab    Progressive Mobility Assessment:  What is this patient's Current Level of Mobility?: Ambulatory-Up Ad Mami  How was this patient Mobilized today?: Edge of Bed, Up to Chair,  Up to Toilet/Shower, and Up in Room, ambulated 15 ft                 With Whom? Nurse and PCA                 Level of Difficulty/Assistance: 1x Assist     Pt resting in bed at this time on  4.5 L O2. Pt with complaints of shortness of breath. Pt with pitting lower extremity edema.      Patient and/or Family's stated Goal of Care this Admission: reduce shortness of breath, increase activity tolerance, better understand heart failure and disease management, be more comfortable, and reduce lower extremity edema prior to discharge        :

## 2022-04-03 NOTE — PROGRESS NOTES
Pt continues to refuse bed alarm, reinforced education on importance of calling for staff assistance before getting out of bed.

## 2022-04-03 NOTE — PROGRESS NOTES
[facility] Progress Note           Subjective/   75y.o. year old male who we are seeing in consultation for MARGARITO/CKD-3. HPI:  Renal function better with diuresis, urine output of 2.7 liters over last 24 hours. States shortness of breath better, edema & weights trending down. ROS:  Intake adequate, +weak.     Objective/   GEN:  Chronically ill, BP (!) 119/52   Pulse 65   Temp 98.5 °F (36.9 °C) (Oral)   Resp 16   Ht 5' 9\" (1.753 m)   Wt 293 lb (132.9 kg)   SpO2 94%   BMI 43.27 kg/m²   HEENT: non-icteric, no JVD  CV: S1, S2 without m/r/g; ++ LE edema  RESP: CTA B without w/r/r; breathing wnl  ABD: +bs, soft, nt, no hsm  SKIN: warm, no rashes    Data/  Recent Labs     04/01/22  0523 04/02/22  0745 04/03/22  0844   WBC 7.7 6.0 5.4   HGB 10.3* 10.3* 10.9*   HCT 31.9* 31.9* 33.9*   MCV 87.1 87.7 88.2    152 166     Recent Labs     04/01/22  0523 04/02/22  0745 04/03/22  0844    142 139   K 3.5 3.8 3.8   CL 89* 91* 89*   CO2 40* 41* 39*   GLUCOSE 145* 259* 70   MG 2.90* 3.00* 3.20*   BUN 96* 78* 74*   CREATININE 1.8* 1.5* 1.5*   LABGLOM 38* 47* 47*   GFRAA 46* 56* 56*       Assessment/     - Acute on chronic kidney disease stage 3              Worsening in setting of CHF decompensation, hx of volatile fluctuations in levels   based on volume status & diuretic use              Background DM Nephropathy + Cardiorenal syndrome              Baseline SCr mid 1 range, 1.5-1.7              Follows with Dr. Jody Owens in office   Improving with diuresis                - dCHF exacerbation     - DM2 - poorly controlled     - Normocytic anemia, chronic - Hb at target     - Sabianist    Plan/     - Continue diuresis with lasix drip - agree with increase to 10 mg/hour  - Home diuretics with spironolactone, torsemide, & metolazone on hold  - Trend labs, weights, bp's, & urine output     ____________________________________  Fay Resendiz MD  [facility] [address] [place] [address]. [facility]  Office: [phone]

## 2022-04-03 NOTE — PROGRESS NOTES
Hospitalist Progress Note  4/3/2022 8:18 AM  Subjective:   Admit Date: 3/31/2022  PCP: Dory Aguiar CNP Status:  Inpatient  Interval History: Hospital Day: 4, admitted with acute hypoxic respiratory failure (10 LPM oxygen on baseline 4 LPM) secondary to diastolic heart failure and refractory to increased oral diuretic prior to admission on furosemide gtt exacerbated by acute kidney injury on CKD stage 3a and type 2 diabetes with episodic hypoglycemia on insulin. Epistaxis from nasal cannula oxygen, left nostril, similar to previous episode requiring cauterization with Silver nitrate. Usually happens with dry air in the winter. History of present illness:  Patient noted that he was gaining weight and feeling short of breath at home. Called cardiologist and increased his diuretic dose, but did not help and had to come to the hospital for admission. Baseline left ventricular ejection fraction is about 50%, borderline low. Lower extremity \"weeping\" edema, left > right. He is Taoism.       Diet: controlled carbohydrate (75 gm/meal)  Medications:   Furosemide at 5 mg/hr  linaclotide  290 mcg Oral QAM AC   insulin lispro  10 Units SubCUTAneous TID WC   tamsulosin  0.4 mg Oral Daily   aspirin  81 mg Oral Daily   atorvastatin  80 mg Oral Nightly   carvedilol  6.25 mg Oral BID WC   ferrous sulfate  325 mg Oral TID WC   insulin glargine  80 Units SubCUTAneous BID   isosorbide mononitrate  30 mg Oral Daily   oxycodone acetaminophen  1 tablet Oral 4x Daily   pregabalin  25 mg Oral BID   enoxaparin  30 mg SubCUTAneous BID   insulin lispro SSI   10 + 0-18 Units SubCUTAneous TID WC     Recent Labs     03/31/22 2003 04/01/22  0523 04/02/22  0745   WBC 7.6 7.7 6.0   HGB 10.0* 10.3* 10.3*    152 152   MCV 88.2 87.1 87.7     Recent Labs     03/31/22 2003 04/01/22  0523 04/02/22  0745   * 139 142   K 3.4* 3.5 3.8   CL 86* 89* 91*   CO2 39* 40* 41*   * 96* 78*   CREATININE 1.8* 1.8* 1.5* GLUCOSE 259* 145* 259*     Recent Labs     03/31/22 2003   AST 15   ALT 9*   BILITOT 0.4   ALKPHOS 92     Troponin T:   Recent Labs     03/31/22 2003 04/01/22  0204 04/01/22  0523   TROPONINI 0.05* 0.05* 0.04*     INR (3/31) 1.29  proBNP (3/21) 2,358 pg/mL  Magnesium (4/2) 3.00 mg/dL  Fe / TIBC (4/1) 42 / 268 = 16% iron saturation      POC Glucose:   Recent Labs     04/02/22  1718 04/02/22  1747 04/02/22  1924 04/03/22  0712   POCGLU 60* 78 216* 331*     TTE (1/13/22)  Limited only f/u for LVEF and aortic valve stenosis. Technically difficult examination.  Low normal left ventricular systolic function with ejection fraction of 50%. Moderate aortic stenosis. Aortic stenosis values appear approximately same when compared to echo on 8-. Mild to moderate aortic regurgitation. Objective:   Vitals:  /63   Pulse 87   Temp 98.2 °F (36.8 °C) (Oral)   Resp 18   Ht 5' 9\" (1.753 m)   Wt 293 lb (132.9 kg)   SpO2 90%   BMI 43.27 kg/m²   General appearance: alert and cooperative with exam  Lungs: diffuse rhonchi, bibasilar crackles  Heart: regular rate and rhythm, S1, S2 normal, no murmur, click, rub or gallop  Abdomen: soft, non-tender; bowel sounds normal; no masses,  no organomegaly  Extremities: 2+ edema with venous stasis, neurovascular status intact  Neurologic: No obvious focal neurologic deficits. Assessment and Plan:   1. Acute hypoxic respiratory failure initially on 10 LPM and now down to baseline 4 LPM with diuresis. 2. Diastolic heart failure on furosemide gtt. Continue carvedilol 6.25 mg BID, maintain SBP > 100. ACE/ARB contraindicated by MARGARITO/CKD. 3. Acute kidney injury on CKD stage 3a with improving creatinine to 1.5 (baseline 1.3). Followed by nephrology. 1. CAD on aspirin, statin, and beta blocker. Prior CABG 2010, [facility] in 2012 with patent grafts, mixed ischemia and scar on stress test March 2022 - medical management. Elevated troponin likely supply / demand mismatch.  No ischemic evaluation at this time per cardiology. 4. Iron deficiency anemia in setting of heart failure:  Venofer 200 mg IV daily x 3 days. He received 3 doses of IV Venofer in 2021. He has received IV iron infusions in the past. NO blood products. 5. Type 2 Diabetes (controlled, HgbA1c 7.0%). Cover with a \"sliding scale\" lispro moderate scale prandial correction insulin. 6. Dyslipidemia (LDL 65) on atorvastatin 80 mg nightly. 7. Opioid induced constipation on linaclotide. 8. HENNA baseline. 9. Class III obesity (BMI 32/4) complicates medical management. Advance Directive: Full Code  DVT prophylaxis with enoxaparin 30 mg sub-Q BID. Discharge plannin-2 more days inpatient status.   Will need to determine home diuretic regimen      Koby Carpenter , MD  [facility] Hospitalist

## 2022-04-03 NOTE — PLAN OF CARE
Problem: Metabolic:  Goal: Ability to maintain clinical measurements within normal limits will improve  Description: Ability to maintain clinical measurements within normal limits will improve  4/2/2022 2347 by Jordan Geller RN  Outcome: Ongoing   Continuing to monitor blood glucose levels and administer ordered insulin as appropriate. Problem: OXYGENATION/RESPIRATORY FUNCTION  Goal: Patient will achieve/maintain normal respiratory rate/effort  Description: Respiratory rate and effort will be within normal limits for the patient  Outcome: Ongoing     Patient's EF (Ejection Fraction) is greater than 40%    Heart Failure Medications:  Diuretics[de-identified] Furosemide    (One of the following REQUIRED for EF </= 40%/SYSTOLIC FAILURE but MAY be used in EF% >40%/DIASTOLIC FAILURE)        ACE[de-identified] None        ARB[de-identified] None         ARNI[de-identified] None    (Beta Blockers)  NON- Evidenced Based Beta Blocker (for EF% >40%/DIASTOLIC FAILURE): None    Evidenced Based Beta Blocker::(REQUIRED for EF% <40%/SYSTOLIC FAILURE) Carvedilol- Coreg  . .................................................................................................................................................. Patient's weights and intake/output reviewed: Yes    Patient's Last Weight: 292 lbs obtained by standing scale. Difference of 4 lbs less than last documented weight.       Intake/Output Summary (Last 24 hours) at 4/2/2022 2348  Last data filed at 4/2/2022 2233  Gross per 24 hour   Intake 1680 ml   Output 3050 ml   Net -1370 ml       Comorbidities Reviewed Yes    Patient has a past medical history of Anemia, Angina, Arthritis, CAD (coronary artery disease), CHF (congestive heart failure) ([facility] CKD (chronic kidney disease) stage 3, GFR 30-59 ml/min ([facility]), Clostridium difficile diarrhea, Diabetes mellitus ([facility] Diabetic neuropathy ([facility] Disease of blood and blood forming organ, Dizziness, GERD (gastroesophageal reflux disease), Headache, Hearing loss, Hyperlipidemia, Hypertension, Kidney stone, Refusal of blood transfusions as patient is Denominational, Sleep apnea, Tinnitus, Venous ulcer ([address] and Wound, open. >>For CHF and Comorbidity documentation on Education Time and Topics, please see Education Tab    Progressive Mobility Assessment:  What is this patient's Current Level of Mobility?: Ambulatory- with Assistance  How was this patient Mobilized today?: Edge of Bed, Up to Chair,  Up to Toilet/Shower, and Up in Room, ambulated 100 ft                 With Whom? Nurse and PCA                 Level of Difficulty/Assistance: 1x Assist     Pt resting in bed at this time on  4 L O2. Pt denies shortness of breath. Pt with pitting lower extremity edema.      Patient and/or Family's stated Goal of Care this Admission: reduce shortness of breath, increase activity tolerance, better understand heart failure and disease management, be more comfortable, and reduce lower extremity edema prior to discharge        :

## 2022-04-03 NOTE — PROGRESS NOTES
Perfect serve page to MD: Pt is c/o severe generalized arthritis pain, he has Percocet scheduled during the daytime but does not have anything PRN, could he have an additional dose of Percocet or something else for pain at this time? Thank you!

## 2022-04-03 NOTE — PROGRESS NOTES
Report given to [facility] MADHU. Call light and bedside table within reach. No needs voiced at this time. Bed in lowest position, brakes locked. Pt continues to refuse the bed alarm.

## 2022-04-04 ENCOUNTER — TELEPHONE (OUTPATIENT)
Dept: ENT CLINIC | Age: 67
End: 2022-04-04

## 2022-04-04 LAB
ANION GAP SERPL CALCULATED.3IONS-SCNC: 9 MMOL/L (ref 3–16)
BUN BLDV-MCNC: 65 MG/DL (ref 7–20)
CALCIUM SERPL-MCNC: 9.2 MG/DL (ref 8.3–10.6)
CHLORIDE BLD-SCNC: 90 MMOL/L (ref 99–110)
CO2: 38 MMOL/L (ref 21–32)
CREAT SERPL-MCNC: 1.4 MG/DL (ref 0.8–1.3)
GFR AFRICAN AMERICAN: >60
GFR NON-AFRICAN AMERICAN: 50
GLUCOSE BLD-MCNC: 110 MG/DL (ref 70–99)
GLUCOSE BLD-MCNC: 153 MG/DL (ref 70–99)
GLUCOSE BLD-MCNC: 175 MG/DL (ref 70–99)
GLUCOSE BLD-MCNC: 201 MG/DL (ref 70–99)
GLUCOSE BLD-MCNC: 205 MG/DL (ref 70–99)
GLUCOSE BLD-MCNC: 47 MG/DL (ref 70–99)
GLUCOSE BLD-MCNC: 77 MG/DL (ref 70–99)
GLUCOSE BLD-MCNC: 89 MG/DL (ref 70–99)
MAGNESIUM: 2.7 MG/DL (ref 1.8–2.4)
PERFORMED ON: ABNORMAL
PERFORMED ON: NORMAL
PERFORMED ON: NORMAL
POTASSIUM SERPL-SCNC: 3.6 MMOL/L (ref 3.5–5.1)
SODIUM BLD-SCNC: 137 MMOL/L (ref 136–145)

## 2022-04-04 PROCEDURE — 6370000000 HC RX 637 (ALT 250 FOR IP): Performed by: INTERNAL MEDICINE

## 2022-04-04 PROCEDURE — 2580000003 HC RX 258: Performed by: NURSE PRACTITIONER

## 2022-04-04 PROCEDURE — 6370000000 HC RX 637 (ALT 250 FOR IP): Performed by: NURSE PRACTITIONER

## 2022-04-04 PROCEDURE — 83735 ASSAY OF MAGNESIUM: CPT

## 2022-04-04 PROCEDURE — 1200000000 HC SEMI PRIVATE

## 2022-04-04 PROCEDURE — 6360000002 HC RX W HCPCS: Performed by: NURSE PRACTITIONER

## 2022-04-04 PROCEDURE — 36415 COLL VENOUS BLD VENIPUNCTURE: CPT

## 2022-04-04 PROCEDURE — 6370000000 HC RX 637 (ALT 250 FOR IP): Performed by: STUDENT IN AN ORGANIZED HEALTH CARE EDUCATION/TRAINING PROGRAM

## 2022-04-04 PROCEDURE — 99233 SBSQ HOSP IP/OBS HIGH 50: CPT | Performed by: NURSE PRACTITIONER

## 2022-04-04 PROCEDURE — 6360000002 HC RX W HCPCS: Performed by: INTERNAL MEDICINE

## 2022-04-04 PROCEDURE — 80048 BASIC METABOLIC PNL TOTAL CA: CPT

## 2022-04-04 PROCEDURE — 94761 N-INVAS EAR/PLS OXIMETRY MLT: CPT

## 2022-04-04 PROCEDURE — 2580000003 HC RX 258: Performed by: INTERNAL MEDICINE

## 2022-04-04 PROCEDURE — 2700000000 HC OXYGEN THERAPY PER DAY

## 2022-04-04 RX ORDER — INSULIN GLARGINE 100 [IU]/ML
60 INJECTION, SOLUTION SUBCUTANEOUS 2 TIMES DAILY
Status: DISCONTINUED | OUTPATIENT
Start: 2022-04-04 | End: 2022-04-05

## 2022-04-04 RX ORDER — CARVEDILOL 3.12 MG/1
3.12 TABLET ORAL 2 TIMES DAILY WITH MEALS
Status: DISCONTINUED | OUTPATIENT
Start: 2022-04-04 | End: 2022-04-09 | Stop reason: HOSPADM

## 2022-04-04 RX ORDER — OXYMETAZOLINE HYDROCHLORIDE 0.05 G/100ML
2 SPRAY NASAL 2 TIMES DAILY
Status: COMPLETED | OUTPATIENT
Start: 2022-04-04 | End: 2022-04-05

## 2022-04-04 RX ADMIN — SILVER SULFADIAZINE: 10 CREAM TOPICAL at 16:53

## 2022-04-04 RX ADMIN — OXYCODONE AND ACETAMINOPHEN 1 TABLET: 325; 5 TABLET ORAL at 14:16

## 2022-04-04 RX ADMIN — SODIUM CHLORIDE 200 MG: 9 INJECTION, SOLUTION INTRAVENOUS at 12:05

## 2022-04-04 RX ADMIN — FUROSEMIDE 10 MG/HR: 10 INJECTION, SOLUTION INTRAMUSCULAR; INTRAVENOUS at 00:07

## 2022-04-04 RX ADMIN — OXYMETAZOLINE HCL 2 SPRAY: 0.05 SPRAY NASAL at 20:40

## 2022-04-04 RX ADMIN — INSULIN LISPRO 10 UNITS: 100 INJECTION, SOLUTION INTRAVENOUS; SUBCUTANEOUS at 12:17

## 2022-04-04 RX ADMIN — ATORVASTATIN CALCIUM 80 MG: 80 TABLET, FILM COATED ORAL at 20:38

## 2022-04-04 RX ADMIN — MICONAZOLE NITRATE: 20 CREAM TOPICAL at 20:40

## 2022-04-04 RX ADMIN — INSULIN LISPRO 6 UNITS: 100 INJECTION, SOLUTION INTRAVENOUS; SUBCUTANEOUS at 09:59

## 2022-04-04 RX ADMIN — FUROSEMIDE 10 MG/HR: 10 INJECTION, SOLUTION INTRAMUSCULAR; INTRAVENOUS at 12:05

## 2022-04-04 RX ADMIN — OXYCODONE AND ACETAMINOPHEN 1 TABLET: 325; 5 TABLET ORAL at 20:38

## 2022-04-04 RX ADMIN — CARVEDILOL 3.12 MG: 3.12 TABLET, FILM COATED ORAL at 18:24

## 2022-04-04 RX ADMIN — INSULIN GLARGINE 60 UNITS: 100 INJECTION, SOLUTION SUBCUTANEOUS at 09:59

## 2022-04-04 RX ADMIN — ENOXAPARIN SODIUM 30 MG: 100 INJECTION SUBCUTANEOUS at 20:38

## 2022-04-04 RX ADMIN — ISOSORBIDE MONONITRATE 30 MG: 30 TABLET, EXTENDED RELEASE ORAL at 10:00

## 2022-04-04 RX ADMIN — OXYMETAZOLINE HCL 2 SPRAY: 0.05 SPRAY NASAL at 10:15

## 2022-04-04 RX ADMIN — INSULIN LISPRO 10 UNITS: 100 INJECTION, SOLUTION INTRAVENOUS; SUBCUTANEOUS at 18:25

## 2022-04-04 RX ADMIN — OXYCODONE AND ACETAMINOPHEN 1 TABLET: 325; 5 TABLET ORAL at 10:00

## 2022-04-04 RX ADMIN — INSULIN LISPRO 10 UNITS: 100 INJECTION, SOLUTION INTRAVENOUS; SUBCUTANEOUS at 09:59

## 2022-04-04 RX ADMIN — MICONAZOLE NITRATE: 20 CREAM TOPICAL at 10:01

## 2022-04-04 RX ADMIN — INSULIN LISPRO 3 UNITS: 100 INJECTION, SOLUTION INTRAVENOUS; SUBCUTANEOUS at 12:16

## 2022-04-04 RX ADMIN — INSULIN GLARGINE 30 UNITS: 100 INJECTION, SOLUTION SUBCUTANEOUS at 20:46

## 2022-04-04 RX ADMIN — ASPIRIN 81 MG 81 MG: 81 TABLET ORAL at 10:00

## 2022-04-04 RX ADMIN — ENOXAPARIN SODIUM 30 MG: 100 INJECTION SUBCUTANEOUS at 10:01

## 2022-04-04 RX ADMIN — TAMSULOSIN HYDROCHLORIDE 0.4 MG: 0.4 CAPSULE ORAL at 05:09

## 2022-04-04 RX ADMIN — PREGABALIN 25 MG: 25 CAPSULE ORAL at 20:38

## 2022-04-04 RX ADMIN — POTASSIUM CHLORIDE 10 MEQ: 10 TABLET, EXTENDED RELEASE ORAL at 20:38

## 2022-04-04 RX ADMIN — POTASSIUM CHLORIDE 10 MEQ: 10 TABLET, EXTENDED RELEASE ORAL at 10:00

## 2022-04-04 RX ADMIN — Medication 10 ML: at 20:38

## 2022-04-04 RX ADMIN — OXYCODONE AND ACETAMINOPHEN 1 TABLET: 325; 5 TABLET ORAL at 18:26

## 2022-04-04 ASSESSMENT — PAIN DESCRIPTION - PAIN TYPE
TYPE: CHRONIC PAIN
TYPE: CHRONIC PAIN

## 2022-04-04 ASSESSMENT — PAIN SCALES - GENERAL
PAINLEVEL_OUTOF10: 8
PAINLEVEL_OUTOF10: 7

## 2022-04-04 ASSESSMENT — PAIN DESCRIPTION - LOCATION
LOCATION: GENERALIZED
LOCATION: GENERALIZED

## 2022-04-04 NOTE — PLAN OF CARE
Problem: OXYGENATION/RESPIRATORY FUNCTION  Goal: Patient will maintain patent airway  Outcome: Ongoing     Patient's EF (Ejection Fraction) is greater than 40%    Heart Failure Medications:  Diuretics[de-identified] Furosemide    (One of the following REQUIRED for EF </= 40%/SYSTOLIC FAILURE but MAY be used in EF% >40%/DIASTOLIC FAILURE)        ACE[de-identified] None        ARB[de-identified] None         ARNI[de-identified] None    (Beta Blockers)  NON- Evidenced Based Beta Blocker (for EF% >40%/DIASTOLIC FAILURE): None    Evidenced Based Beta Blocker::(REQUIRED for EF% <40%/SYSTOLIC FAILURE) Carvedilol- Coreg  . .................................................................................................................................................. Patient's weights and intake/output reviewed: Yes    Patient's Last Weight: 291 lbs obtained by bed scale. Difference of 2 lbs less than last documented weight. Intake/Output Summary (Last 24 hours) at 4/4/2022 1120  Last data filed at 4/4/2022 1039  Gross per 24 hour   Intake 960 ml   Output 4650 ml   Net -3690 ml       Comorbidities Reviewed Yes    Patient has a past medical history of Anemia, Angina, Arthritis, CAD (coronary artery disease), CHF (congestive heart failure) ([facility] CKD (chronic kidney disease) stage 3, GFR 30-59 ml/min ([facility]), Clostridium difficile diarrhea, Diabetes mellitus ([facility] Diabetic neuropathy ([facility] Disease of blood and blood forming organ, Dizziness, GERD (gastroesophageal reflux disease), Headache, Hearing loss, Hyperlipidemia, Hypertension, Kidney stone, Refusal of blood transfusions as patient is Lutheran, Sleep apnea, Tinnitus, Venous ulcer ([address] and Wound, open.      >>For CHF and Comorbidity documentation on Education Time and Topics, please see Education Tab    Progressive Mobility Assessment:  What is this patient's Current Level of Mobility?: Ambulatory- with Assistance  How was this patient Mobilized today?: Edge of Bed,  Up to Toilet/Shower and Up in Room, ambulated 50 ft                 With Whom? Nurse and PCA                 Level of Difficulty/Assistance: 1x Assist     Pt resting in bed at this time on 4.5 L O2. Pt denies shortness of breath. Pt with pitting lower extremity edema. Patient and/or Family's stated Goal of Care this Admission: reduce shortness of breath, increase activity tolerance, better understand heart failure and disease management, be more comfortable and reduce lower extremity edema prior to discharge      Problem: Nutritional:  Goal: Maintenance of adequate nutrition will improve  Description: Maintenance of adequate nutrition will improve  Outcome: Ongoing   Patient with diagnosis of Diabetes. Active orders for ACHS glucose monitoring, SSI and Lantus. Instructed importance of following carb control diet to maintain stable gluocose levels.

## 2022-04-04 NOTE — CONSULTS
[facility] Wound Ostomy Continence Nurse  Consult Note       NAME:  Corrinne Kin [facility]  MEDICAL RECORD NUMBER:  [id number]  AGE: 75 y.o. GENDER: male  : 1955  TODAY'S DATE:  2022    Subjective  I was wondering where you have been. I was admitted last week and have not seen you yet. Reason for WOCN Evaluation and Assessment:       Colin Clayton is a 75 y.o. male referred by:   [x] Physician  [] Nursing  [] Other:     Wound Identification:  Wound Type: Venous ulcers right and left lower legs and traumatic wounds left 1st and 3rd dorsal toes. Contributing Factors: edema, venous stasis, diabetes, chronic pressure, decreased mobility, shear force and obesity    Wound History: Known patient to wound care. 75 y.o. male, with PMH of CHF, COPD, morbid obesity, HTN, CAD, HLD, CKD and DM, who presented to [facility] with shortness of breath. He has used unna boots in the past but reports lately after the home care places the boots his legs itch too much and it is difficult to leave the unna boots on. MD had already ordered silvadene cream.    Current Wound Care Treatment:  Foam, bandaid and roll guaze dressings.     Patient Goal of Care:  [x] Wound Healing  [] Odor Control  [] Palliative Care  [] Pain Control   [] Other:         PAST MEDICAL HISTORY        Diagnosis Date    Anemia     Angina     Arthritis     CAD (coronary artery disease)     CH[facility]congestive heart failure) ([facility]     CKD (chronic kidney disease) stage 3, GFR 30-59 ml/min ([facility])     Clostridium difficile diarrhea 3/16/12; 12    positive stool toxin    Diabetes mellitus ([facility] [address]     Diabetic neuropathy ([facility])     feet and legs    Disease of blood and blood forming organ     Dizziness     When he moves his head/ loses his balance    GERD (gastroesophageal reflux disease)     gastric ulcer    Headache     Hearing loss     Hyperlipidemia     Hypertension     Kidney stone     Refusal of blood transfusions as patient is [place]     Sleep apnea     Tinnitus     Venous ulcer (HCC)     LLE    Wound, open 2012       PAST SURGICAL HISTORY    Past Surgical History:   Procedure Laterality Date    CARDIAC SURGERY      Dec 27 2010, triple bypass    CHOLECYSTECTOMY  2011    HERNIA REPAIR  age1    OTHER SURGICAL HISTORY  11 REPAIR LOWER STERNAL INCISION, REMOVAL OF ONE STERNAL WIRE,    OTHER SURGICAL HISTORY  10/10/2014    phacoemulsification of cataract with intraocular lens implant left eye    PAIN MANAGEMENT PROCEDURE N/A 2/10/2022    MIDLINE CERVICAL SIX SEVEN EPIDURAL STEROID INJECTION SITE CONFIRMED BY FLUOROSCOPY performed by Bettie Sandy MD at [facility]    UPPER GASTROINTESTINAL ENDOSCOPY         FAMILY HISTORY    Family History   Problem Relation Age of Onset    Heart Disease Mother     Heart Disease Father     Cancer Father     Diabetes Brother     Diabetes Brother        SOCIAL HISTORY    Social History     Tobacco Use    Smoking status: Former Smoker     Packs/day: 1.00     Years: 16.00     Pack years: 16.00     Quit date: 1/10/1988     Years since quittin.2    Smokeless tobacco: Never Used    Tobacco comment: 22 yrs ago   Vaping Use    Vaping Use: Never used   Substance Use Topics    Alcohol use: No     Alcohol/week: 0.0 standard drinks    Drug use: No       ALLERGIES    Allergies   Allergen Reactions    Amitriptyline Other (See Comments)     tremor    Celecoxib      Hyperkalemia    Cymbalta [Duloxetine Hcl] Other (See Comments)     Tremors and slurred speech    Elavil [Amitriptyline Hcl]      tremor    Gabapentin      Tremor at high dose    Nsaids      Gastric ulcer       MEDICATIONS    No current facility-administered medications on file prior to encounter.      Current Outpatient Medications on File Prior to Encounter   Medication Sig Dispense Refill    tamsulosin (FLOMAX) 0.4 MG capsule Take 0.4 mg by mouth Daily      LINZESS 290 MCG CAPS capsule       oxyCODONE-acetaminophen (PERCOCET) 5-325 MG per tablet Take 1 tablet by mouth 4 times daily for 30 days. 120 tablet 0    [START ON 4/26/2022] oxyCODONE-acetaminophen (PERCOCET) 5-325 MG per tablet Take 1 tablet by mouth 4 times daily for 30 days. 120 tablet 0    atorvastatin (LIPITOR) 80 MG tablet Take 1 tablet by mouth nightly 30 tablet 3    carvedilol (COREG) 6.25 MG tablet Take 1 tablet by mouth 2 times daily (with meals) 60 tablet 3    metOLazone (ZAROXOLYN) 5 MG tablet Once a week on thursdays 12 tablet 2    torsemide (DEMADEX) 100 MG tablet Take 0.5 tablets by mouth 2 times daily 30 tablet 2    pregabalin (LYRICA) 25 MG capsule Take 1 capsule by mouth 2 times daily for 1 day. 2 capsule 0    allopurinol (ZYLOPRIM) 300 MG tablet Take 1 tablet by mouth daily 30 tablet 0    aspirin-acetaminophen-caffeine (EXCEDRIN MIGRAINE) 250-250-65 MG per tablet Take 1 tablet by mouth every 6 hours as needed for Headaches      naloxone 4 MG/0.1ML LIQD nasal spray 1 spray by Nasal route as needed for Opioid Reversal (Patient not taking: Reported on 4/1/2022) 1 each 0    spironolactone (ALDACTONE) 25 MG tablet Take 1 tablet by mouth daily 30 tablet 3    linaCLOtide (LINZESS PO) Take by mouth      acetaminophen (TYLENOL) 500 MG tablet Take 2 tablets by mouth 4 times daily as needed for Pain (Patient not taking: Reported on 4/1/2022) 60 tablet 0    hydrocortisone (ANUSOL-HC) 2.5 % rectal cream Place rectally 2 times daily.  1 Tube 0    senna (SENOKOT) 8.6 MG TABS tablet Take 1 tablet by mouth 2 times daily 120 tablet 0    docusate sodium (COLACE, DULCOLAX) 100 MG CAPS Take 100 mg by mouth 2 times daily      insulin aspart (NOVOLOG) 100 UNIT/ML injection vial Inject into the skin 3 times daily (before meals) 50 units with breakfast, 20 units with lunch and 60 units with dinner      insulin glargine (LANTUS) 100 UNIT/ML injection vial Inject 55 Units into the skin 2 times daily (Patient taking differently: Inject 80 Units into the skin 2 times daily ) 1 vial 1    Compression Stockings MISC by Does not apply route 2 pair, knee high compression stockings, fitted/ zippered 2 each 1    silver sulfADIAZINE (SILVADENE) 1 % cream Apply to BL lower leg ulcers daily 20 g 0    nitroGLYCERIN (NITROSTAT) 0.4 MG SL tablet Place 1 tablet under the tongue every 5 minutes as needed 25 tablet 1    fluticasone (FLONASE) 50 MCG/ACT nasal spray 1 spray by Nasal route nightly as needed.  aspirin 81 MG chewable tablet Take 1 tablet by mouth daily. 30 tablet     isosorbide mononitrate (IMDUR) 30 MG CR tablet Take 1 tablet by mouth daily. 30 tablet 0    ferrous sulfate 325 (65 FE) MG tablet Take 325 mg by mouth 2 times daily       omeprazole (PRILOSEC) 20 MG capsule Take 20 mg by mouth 2 times daily. Objective  Sitting on side of the bed. Feet dependent. /68   Pulse 65   Temp 97.9 °F (36.6 °C) (Oral)   Resp 16   Ht 5' 9\" (1.753 m)   Wt 291 lb (132 kg)   SpO2 93%   BMI 42.97 kg/m²     LABS:  WBC:    Lab Results   Component Value Date    WBC 5.4 04/03/2022     H/H:    Lab Results   Component Value Date    HGB 10.9 04/03/2022    HCT 33.9 04/03/2022     PTT:    Lab Results   Component Value Date    APTT 39.2 03/31/2022   [APTT}  PT/INR:    Lab Results   Component Value Date    PROTIME 14.7 03/31/2022    INR 1.29 03/31/2022     HgBA1c:    Lab Results   Component Value Date    LABA1C 7.0 04/01/2022       Assessment  Lower legs edematous, dusky due to poor venous return. Left lateral lower leg with deflated and de-roofed bulla. Patient states that happened right before he came into the hospital. He also reports he hit hs left toe on the bed side table and if broke open. Right lower leg cluster of 2 wounds open and some dry scabbed areas.   See photo's   John Risk Score: John Scale Score: 19    Patient Active Problem List   Diagnosis    DM (diabetes mellitus) ([facility] [address]    Coronary artery disease involving native coronary artery of native heart without angina pectoris    Anemia of chronic disease    Essential hypertension    Hyperkalemia    Chronic diastolic heart failure ([facility])    Pulmonary hypertension due to left ventricular diastolic dysfunction ([facility])    Morbid obesity ([facility])    S/P CABG x 3    DM2 (diabetes mellitus, type 2) ([facility])    Morbid obesity with BMI of 50.0-59.9, adult ([facility])    HLD (hyperlipidemia)    Cardiomyopathy ([facility])    Productive cough    Chronic pain    Severe obstructive sleep apnea    Obesity, Class III, BMI 40-49.9 (morbid obesity) ([facility])    Acute diastolic congestive heart failure ([facility])    Degeneration of lumbar or lumbosacral intervertebral disc    Displacement of lumbar intervertebral disc without myelopathy    Lumbosacral spondylosis without myelopathy    Lumbar facet arthropathy    Disc displacement, lumbar    Spinal stenosis of lumbar region    Mixed conductive and sensorineural hearing loss of left ear with restricted hearing of right ear    Tympanic membrane perforation, left    Chest pain    Cor pulmonale, chronic ([facility])    Pulmonary hypertension due to alveolar hypoventilation disorder ([facility])    Nonrheumatic aortic valve stenosis    Chronic neck pain    Dyspnea    Acute diastolic CHF (congestive heart failure) ([facility])    Cervical stenosis of spinal canal    Degeneration of cervical intervertebral disc    Cervical radiculitis    Acute on chronic diastolic CHF (congestive heart failure) ([facility])    Acute respiratory failure with hypoxia ([facility])    Acute on chronic renal insufficiency       Measurements:  Wound 01/14/22 Leg Left; Lower; Lateral (Active)   Wound Image   04/04/22 1654   Wound Etiology Venous 04/04/22 1654   Dressing Status New dressing applied;Clean;Dry; Intact 04/04/22 1654   Wound Cleansed Irrigated with saline 04/04/22 1654   Dressing/Treatment Pharmaceutical agent (see MAR); Dry dressing;Roll gauze 04/04/22 1654   Offloading for Diabetic Foot Ulcers Offloading ordered 04/04/22 1654   Dressing Change Due 04/05/22 04/04/22 1654   Wound Length (cm) 8 cm 04/04/22 1654   Wound Width (cm) 9 cm 04/04/22 1654   Wound Depth (cm) 0.1 cm 04/04/22 1654   Wound Surface Area (cm^2) 72 cm^2 04/04/22 1654   Change in Wound Size % (l*w) -700 04/04/22 1654   Wound Volume (cm^3) 7.2 cm^3 04/04/22 1654   Wound Healing % -700 04/04/22 1654   Distance Tunneling (cm) 0 cm 04/04/22 1654   Tunneling Position ___ O'Clock 0 04/04/22 1654   Undermining Starts ___ O'Clock 0 04/04/22 1654   Undermining Ends___ O'Clock 0 04/04/22 1654   Undermining Maxium Distance (cm) 0 04/04/22 1654   Wound Assessment Pink/red; Other (Comment) 04/04/22 1654   Drainage Amount Moderate 04/04/22 1654   Drainage Description Serosanguinous; Serous 04/04/22 1654   Odor None 04/04/22 1654   Radha-wound Assessment Edematous; Induration;Blanchable erythema 04/04/22 1654   Margins Attached edges; Defined edges 04/04/22 1654   Wound Thickness Description not for Pressure Injury Partial thickness 04/04/22 1654   Number of days: 79      Left lower lateral leg:                   Wound 04/04/22 Leg Right; Lower; Inner cluster of 2 (Active)   Wound Image   04/04/22 1655   Wound Etiology Venous 04/04/22 1655   Dressing Status New dressing applied;Clean;Dry; Intact 04/04/22 1655   Wound Cleansed Irrigated with saline 04/04/22 1655   Dressing/Treatment Pharmaceutical agent (see MAR); Dry dressing;Roll gauze 04/04/22 1655   Offloading for Diabetic Foot Ulcers Offloading ordered 04/04/22 1655   Dressing Change Due 04/05/22 04/04/22 1655   Wound Length (cm) 5 cm 04/04/22 1655   Wound Width (cm) 7.5 cm 04/04/22 1655   Wound Depth (cm) 0.1 cm 04/04/22 1655   Wound Surface Area (cm^2) 37.5 cm^2 04/04/22 1655   Wound Volume (cm^3) 3.75 cm^3 04/04/22 1655   Distance Tunneling (cm) 0 cm 04/04/22 1655   Tunneling Position ___ O'Clock 0 04/04/22 1655   Undermining Starts ___ O'Clock 0 04/04/22 1655   Undermining Ends___ O'Clock 0 04/04/22 1655 Pain Assessment:  Severity:  0 / 10  Quality of pain: N/A  Wound Pain Timing/Severity: none  Premedicated: No    Plan   Plan of Care: Wound 01/14/22 Leg Left; Lower; Lateral-Dressing/Treatment: Pharmaceutical agent (see MAR),Dry dressing,Roll gauze  Wound 04/04/22 Leg Right; Lower; Inner cluster of 2-Dressing/Treatment: Pharmaceutical agent (see MAR),Dry dressing,Roll gauze  Wound 04/04/22 Toe (Comment  which one) Anterior; Left-Dressing/Treatment: Pharmaceutical agent (see MAR),Dry dressing,Roll gauze     Recommend:  Clean lower legs with bath wipes. Clean wound beds with normal saline. Apply silvadene cream to open areas right and left lower leg and left 3rd and 1st toe dorsal wounds. Cover with dry dressing, abdominal pads if needed for increased drainage and roll guaze to secure dressing. Change daily. Call wound care for deterioration [phone] or call [phone] and leave message. Wound care to follow. Specialty Bed Required : Yes   [] Low Air Loss   [x] Pressure Redistribution  [] Fluid Immersion  [] Bariatric  [] Total Pressure Relief  [] Other:     Current Diet: ADULT DIET; Regular; 5 carb choices (75 gm/meal); Low Sodium (2 gm)  Dietician consult:  Yes    Discharge Plan:  Placement for patient upon discharge: skilled nursing    Patient appropriate for Outpatient [address] [facility] [address]: Yes    Referrals:  []  / discharge planner following  [] [address] [facility]  [] Supplies  [] Other    Patient/Caregiver Teaching: Instructed on treatment.   Level of patient/caregiver understanding able to:   [x] Indicates understanding       [x] Needs reinforcement  [] Unsuccessful      [] Verbal Understanding  [] Demonstrated understanding       [] No evidence of learning  [] Refused teaching         [] N/A       Electronically signed by Sami Vila, RN, MSN, Vasiliy Davalos on 4/4/2022 at 5:07 PM

## 2022-04-04 NOTE — PROGRESS NOTES
Hospitalist Progress Note  4/4/2022 8:33 AM  Subjective:   Admit Date: 3/31/2022  PCP: Shahzad Muñoz CNP Status:  Inpatient  Interval History: Hospital Day: 5, admitted with acute hypoxic respiratory failure (10 LPM oxygen on baseline 4 LPM) secondary to diastolic heart failure and refractory to increased oral diuretic prior to admission on furosemide gtt exacerbated by acute kidney injury on CKD stage 3a and type 2 diabetes with episodic hypoglycemia on insulin. Epistaxis from nasal cannula oxygen, left nostril, similar to previous episode requiring cauterization with Silver nitrate. Usually happens with dry air in the winter. Overall feels grossly unchanged from last 24 hours. No CP, new dyspnea or worsening leg swelling. History of present illness:  Patient noted that he was gaining weight and feeling short of breath at home. Called cardiologist and increased his diuretic dose, but did not help and had to come to the hospital for admission. Baseline left ventricular ejection fraction is about 50%, borderline low. Lower extremity \"weeping\" edema, left > right. He is Sh[employer].       Diet: controlled carbohydrate (75 gm/meal)  Medications:   Furosemide at 5 mg/hr  linaclotide  290 mcg Oral QAM AC   insulin lispro  10 Units SubCUTAneous TID WC   tamsulosin  0.4 mg Oral Daily   aspirin  81 mg Oral Daily   atorvastatin  80 mg Oral Nightly   carvedilol  6.25 mg Oral BID WC   ferrous sulfate  325 mg Oral TID WC   insulin glargine  80 Units SubCUTAneous BID   isosorbide mononitrate  30 mg Oral Daily   oxycodone acetaminophen  1 tablet Oral 4x Daily   pregabalin  25 mg Oral BID   enoxaparin  30 mg SubCUTAneous BID   insulin lispro SSI   10 + 0-18 Units SubCUTAneous TID WC     Recent Labs     04/02/22  0745 04/03/22  0844   WBC 6.0 5.4   HGB 10.3* 10.9*    166   MCV 87.7 88.2     Recent Labs     04/02/22  0745 04/03/22  0844 04/04/22  0720    139 137   K 3.8 3.8 3.6   CL 91* 89* 90* CO2 41* 39* 38*   BUN 78* 74* 65*   CREATININE 1.5* 1.5* 1.4*   GLUCOSE 259* 70 201*     No results for input(s): AST, ALT, ALB, BILITOT, ALKPHOS in the last 72 hours. Troponin T:   No results for input(s): TROPONINI in the last 72 hours. INR (3/31) 1.29  proBNP (3/21) 2,358 pg/mL  Magnesium (4/2) 3.00 mg/dL  Fe / TIBC (4/1) 42 / 268 = 16% iron saturation      POC Glucose:   Recent Labs     04/04/22  0108 04/04/22  0127 04/04/22  0508 04/04/22  0729   POCGLU 47* 89 153* 205*     TTE (1/13/22)  Limited only f/u for LVEF and aortic valve stenosis. Technically difficult examination.  Low normal left ventricular systolic function with ejection fraction of 50%. Moderate aortic stenosis. Aortic stenosis values appear approximately same when compared to echo on 8-. Mild to moderate aortic regurgitation. Objective:   Vitals:  /62   Pulse 69   Temp 98.1 °F (36.7 °C) (Oral)   Resp 18   Ht 5' 9\" (1.753 m)   Wt 291 lb (132 kg)   SpO2 97%   BMI 42.97 kg/m²   General appearance: alert and cooperative with exam  Lungs:  Very diminished breath sounds throughout on 4.5L NC  Heart: rrr no murmur  Abdomen: soft, non-tender; bowel sounds normal; no masses,  no organomegaly  Extremities: 2+pitting edema bilaterally with chronic venous insufficiency bilaterally, Left leg with weeping wound  Neurologic: No obvious focal neurologic deficits. Assessment and Plan:     Acute hypoxic respiratory failure initially on 10 LPM and now down to baseline 4 LPM with diuresis. Diastolic heart failure on furosemide gtt. Continue carvedilol 6.25 mg BID, maintain SBP > 100. ACE/ARB contraindicated by MARGARITO/CKD. Acute kidney injury on CKD stage 3a with improving creatinine to 1.5 (baseline 1.3). Followed by nephrology. CAD on aspirin, statin, and beta blocker. Prior CABG 2010, [facility] in 2012 with patent grafts, mixed ischemia and scar on stress test March 2022 - medical management.   Elevated troponin likely supply / demand mismatch. No ischemic evaluation at this time per cardiology. Iron deficiency anemia in setting of heart failure:  Venofer 200 mg IV daily x 3 days. He received 3 doses of IV Venofer in 2021. He has received IV iron infusions in the past. NO blood products. Type 2 Diabetes (controlled, HgbA1c 7.0%). Cover with a \"sliding scale\" lispro moderate scale prandial correction insulin. Complicated by episodes of hypoglycemia. Adjusted basal insulin today. Dyslipidemia (LDL 65) on atorvastatin 80 mg nightly. Opioid induced constipation on linaclotide. HENNA baseline. Class III obesity (BMI 79/0) complicates medical management. Epistaxis- likely related to dry air from continuous O2. Trial afrin x 3 days    Advance Directive: Full Code  DVT prophylaxis with enoxaparin 30 mg sub-Q BID. Discharge plannin-2 more days inpatient status.   Will need to determine home diuretic regimen      Karyn Stone MD  [address] [place] [address]

## 2022-04-04 NOTE — TELEPHONE ENCOUNTER
Patient called in to this office, states that he is currently admitted at [facility]. States that he would like Dr. Dat Rasmussen to come over to the hospital and check on him. States that he is still having a lot of trouble with both ears. Patient states that he has been quite ill, and no longer drives. Room number 202, on the second floor.  [phone]

## 2022-04-04 NOTE — PLAN OF CARE
Problem: Metabolic:  Goal: Ability to maintain clinical measurements within normal limits will improve  Description: Ability to maintain clinical measurements within normal limits will improve  Outcome: Ongoing   Continuing to monitor blood glucose levels and administer ordered insulin as appropriate. Problem: HEMODYNAMIC STATUS  Goal: Patient has stable vital signs and fluid balance  4/3/2022 2321 by Wyatt Vazquez RN  Outcome: Ongoing     Patient's EF (Ejection Fraction) is greater than 40%    Heart Failure Medications:  Diuretics[de-identified] Furosemide    (One of the following REQUIRED for EF </= 40%/SYSTOLIC FAILURE but MAY be used in EF% >40%/DIASTOLIC FAILURE)        ACE[de-identified] None        ARB[de-identified] None         ARNI[de-identified] None    (Beta Blockers)  NON- Evidenced Based Beta Blocker (for EF% >40%/DIASTOLIC FAILURE): None    Evidenced Based Beta Blocker::(REQUIRED for EF% <40%/SYSTOLIC FAILURE) Carvedilol- Coreg  . .................................................................................................................................................. Patient's weights and intake/output reviewed: Yes    Patient's Last Weight: 293 lbs obtained by standing scale. Difference of 1 lbs more than last documented weight.       Intake/Output Summary (Last 24 hours) at 4/3/2022 2321  Last data filed at 4/3/2022 2020  Gross per 24 hour   Intake 720 ml   Output 3300 ml   Net -2580 ml       Comorbidities Reviewed Yes    Patient has a past medical history of Anemia, Angina, Arthritis, CAD (coronary artery disease), CHF (congestive heart failure) ([facility] [address] CKD (chronic kidney disease) stage 3, GFR 30-59 ml/min ([facility]), Clostridium difficile diarrhea, Diabetes mellitus ([facility] [address] Diabetic neuropathy ([facility] [address] Disease of blood and blood forming organ, Dizziness, GERD (gastroesophageal reflux disease), Headache, Hearing loss, Hyperlipidemia, Hypertension, Kidney stone, Refusal of blood transfusions as patient is Religious, Sleep apnea, Tinnitus, Venous ulcer ([facility] [address] and Wound, open. >>For CHF and Comorbidity documentation on Education Time and Topics, please see Education Tab    Progressive Mobility Assessment:  What is this patient's Current Level of Mobility?: Ambulatory- with Assistance  How was this patient Mobilized today?: Edge of Bed, Up to Chair,  Up to Toilet/Shower, and Up in Room, ambulated 200 ft                 With Whom? Nurse and PCA                 Level of Difficulty/Assistance: 1x Assist     Pt resting in bed at this time on  4.5 L O2. Pt with complaints of shortness of breath. Pt with pitting lower extremity edema.      Patient and/or Family's stated Goal of Care this Admission: reduce shortness of breath, increase activity tolerance, better understand heart failure and disease management, be more comfortable, and reduce lower extremity edema prior to discharge        :

## 2022-04-04 NOTE — PROGRESS NOTES
[facility] Progress Note           Subjective/   75y.o. year old male who we are seeing in consultation for MARGARITO/CKD-3. HPI:  The patient was seen and examined; he feels well today with no CP, SOB, nausea or vomiting. ROS: No fever or chills. Social: No family at bedside.     Objective/   GEN:  Chronically ill, /62   Pulse 69   Temp 98.1 °F (36.7 °C) (Oral)   Resp 18   Ht 5' 9\" (1.753 m)   Wt 291 lb (132 kg)   SpO2 97%   BMI 42.97 kg/m²      HEENT: non-icteric, no JVD  CV: S1, S2 without m/r/g; ++ LE edema  RESP: CTA B without w/r/r; breathing wnl  ABD: +bs, soft, nt, no hsm  SKIN: warm, no rashes    Data/  Recent Labs     04/02/22  0745 04/03/22  0844   WBC 6.0 5.4   HGB 10.3* 10.9*   HCT 31.9* 33.9*   MCV 87.7 88.2    166     Recent Labs     04/02/22  0745 04/03/22  0844 04/04/22  0720    139 137   K 3.8 3.8 3.6   CL 91* 89* 90*   CO2 41* 39* 38*   GLUCOSE 259* 70 201*   MG 3.00* 3.20* 2.70*   BUN 78* 74* 65*   CREATININE 1.5* 1.5* 1.4*   LABGLOM 47* 47* 50*   GFRAA 56* 56* >60       Assessment/     - Acute on chronic kidney disease stage 3              Worsening in setting of CHF decompensation, hx of volatile fluctuations in levels   based on volume status & diuretic use              Background DM Nephropathy + Cardiorenal syndrome              Baseline SCr mid 1 range, 1.5-1.7              Follows with Dr. Olivia Vargas in office   Improving with diuresis                - dCHF exacerbation     - DM2 - poorly controlled     - Normocytic anemia, chronic - Hb at target     - Restorationism    Plan/     - Continue diuresis with lasix drip at 10 mg/hour  - Home diuretics with spironolactone, torsemide, & metolazone on hold  - Trend labs, weights, bp's, & urine output

## 2022-04-04 NOTE — PROGRESS NOTES
[address]   Daily Progress Note    Admit Date:  3/31/2022  HPI:    Chief Complaint   Patient presents with    Shortness of Breath     medics report shortness of breath, pt on home oxygen 4 liters, pt placed on non rebreather. pt wheezing. pt given douneb. 10 liters  bs 268    Leg Problem     pt seeing wound care for left leg wheeping. pt reports this was tuesday. Erin Duverney resented with worsening shortness of breath. Hx of dCHF, CMP, CAD s/p CABG 2010, moderate AS, HTN, HLD as well as CKD, DM, HENNA, obesity. Subjective:  Mr. Rosette Nolen sitting up on side of bed. Weight down but felt due to having a BM after 4 days. Does feel he had increased urine output overnight.      Objective:   Patient Vitals for the past 24 hrs:   BP Temp Temp src Pulse Resp SpO2 Weight   04/04/22 0826 127/62 98.1 °F (36.7 °C) Oral 69 18 97 % --   04/04/22 0312 138/72 98.2 °F (36.8 °C) Oral 61 18 98 % 291 lb (132 kg)   04/03/22 2319 108/60 97.8 °F (36.6 °C) Oral 61 20 96 % --   04/03/22 2103 116/61 99.7 °F (37.6 °C) Oral 63 18 94 % --   04/03/22 1802 (!) 119/52 -- -- 65 -- -- --   04/03/22 1637 136/66 -- -- 63 -- -- --   04/03/22 1613 (!) 97/46 98.5 °F (36.9 °C) Oral 66 16 94 % --   04/03/22 1308 124/63 97.7 °F (36.5 °C) Oral 62 16 94 % --       Intake/Output Summary (Last 24 hours) at 4/4/2022 0909  Last data filed at 4/4/2022 0831  Gross per 24 hour   Intake 840 ml   Output 4650 ml   Net -3810 ml     Wt Readings from Last 3 Encounters:   04/04/22 291 lb (132 kg)   03/14/22 282 lb 9.6 oz (128.2 kg)   02/10/22 280 lb (127 kg)     Weights(Current Encounter) (last 50 days)          Date/Time Weight Weight Method     04/04/22 03:12:25 291 lb (132 kg) Standing scale     04/03/22 03:26:10 293 lb (132.9 kg) Standing scale     04/02/22 0329 292 lb (132.5 kg) Standing scale     04/01/22 0015 296 lb 8 oz (134.5 kg) Standing scale             WEIGHTS LAST HOSPITAL STAY:    03/14/22 04:26:12 282 lb 9.6 oz (128.2 kg) Actual;Standing scale     03/13/22 0430 283 lb 1.6 oz (128.4 kg) Actual;Standing scale     03/12/22 0500 281 lb 14.4 oz (127.9 kg) Actual;Standing scale     03/11/22 03:41:03 281 lb 14.4 oz (127.9 kg) Standing scale     03/10/22 0507 281 lb 4.8 oz (127.6 kg) Actual;Standing scale     03/09/22 0350 286 lb 9.6 oz (130 kg) Standing scale     03/08/22 03:44:58 291 lb (132 kg) Actual;Standing scale     03/07/22 2008 293 lb 6.4 oz (133.1 kg) Standing scale     03/07/22 1342 283 lb (128.4 kg) Stated           ASSESSMENT:   1. Shortness of breath: 2/2 CHF  2. Acute on chronic diastolic CHF: weight down 2 lbs, net -4.8L, symptoms mildly improved  3. CAD: Prior CABG 2010, [facility] in 2012 with patent grafts, mixed ischemia and scar on stress test March 2022 - medical management  4. Moderate aortic stenosis:   5. Elevated troponin: likely supply/ demand mismatch  6. CKD: improved/ stable, per nephrology  7. HLD: LDL 65, on statin  8. Diabetes mellitus: A1c 7.0  9. HENNA:   10. Anemia: stable  11. Noncompliance: has not followed up in office, non-adherent to diet/ fluids  12. Jehovah witness      PLAN:  1. Continue IV Lasix 10 mg/hr  2. Continue Imdur, Coreg (dose decreased per nephrology), asa and statin  3. Daily labs and weights  4. Will need to determine home diuretic regimen after diuresis. Prior goal weight 281-283 lbs.    5. Consider care facility as he is having more difficulty with care at home given labile blood sugars and vision loss    Bianca Camacho, APRN - CNP, 4/4/2022, 9:09 AM  [address]   [phone]       Telemetry: SR 60-70  NYHA: III    Physical Exam:  General:  Awake, alert, NAD  Skin:  Warm and dry  Neck:  JVP 8-9cm  Chest:  Clear to auscultation though diminished  Cardiovascular:  RRR, normal S1S2, 2/6 JEMIMA, no g/r  Abdomen:  Soft, nontender, +bowel sounds  Extremities:  2+ BLE edema with wounds        Medications:    oxymetazoline  2 spray Each Nostril BID    insulin glargine  60 Units SubCUTAneous BID    carvedilol  3.125 mg Oral BID     linaclotide  290 mcg Oral QAM AC    iron sucrose (VENOFER) iv piggyback 100 mL (Admin over 60 minutes)  200 mg IntraVENous Q24H    potassium chloride  10 mEq Oral BID    insulin lispro  10 Units SubCUTAneous TID     tamsulosin  0.4 mg Oral Daily    miconazole   Topical BID    aspirin  81 mg Oral Daily    atorvastatin  80 mg Oral Nightly    isosorbide mononitrate  30 mg Oral Daily    oxyCODONE-acetaminophen  1 tablet Oral 4x Daily    silver sulfADIAZINE   Topical Daily    pregabalin  25 mg Oral BID    sodium chloride flush  5-40 mL IntraVENous 2 times per day    enoxaparin  30 mg SubCUTAneous BID    insulin lispro  0-18 Units SubCUTAneous TID     insulin lispro  0-9 Units SubCUTAneous Nightly      sodium chloride      dextrose      furosemide (LASIX) 1mg/ml infusion 10 mg/hr (04/04/22 0007)       Lab Data: Lab results independently reviewed and analyzed by myself 4/4/2022    CBC:   Recent Labs     04/02/22  0745 04/03/22  0844   WBC 6.0 5.4   HGB 10.3* 10.9*    166     BMP:    Recent Labs     04/02/22  0745 04/03/22  0844 04/04/22  0720    139 137   K 3.8 3.8 3.6   CO2 41* 39* 38*   BUN 78* 74* 65*   CREATININE 1.5* 1.5* 1.4*     INR:    No results for input(s): INR in the last 72 hours. BNP:    Recent Labs     04/03/22  0844   PROBNP 1,899*     Cardiac Enzymes:   No results for input(s): TROPONINI in the last 72 hours. Lipids:   Lab Results   Component Value Date    TRIG 92 04/01/2022    TRIG 110 08/24/2021    HDL 30 04/01/2022    HDL 33 08/24/2021    HDL 31 01/11/2012    HDL 27 07/27/2010    LDLCALC 65 04/01/2022    LDLCALC 49 08/24/2021       Cardiac Imaging:    Stress Test: 3/10/22  Summary    Abnormal moderate risk myocardial perfusion study. There is a large area of mixed ischemia and scar within the infero-apical,    apical-lateral, lateral, posterior-lateral, and posterior-basal segments.     The left ventricular size is moderately dilated. The estimated left ventricular function is 67%. Echo: 1/13/22   Summary   Limited only f/u for LVEF and aortic valve stenosis. Technically difficult examination. Low normal left ventricular systolic function with ejection fraction of 50%. Moderate aortic stenosis. Aortic stenosis values appear approximately same when compared to echo on   8-. Mild to moderate aortic regurgitation. ECHO: 9/20/16  Left ventricular systolic function is normal with ejection fraction   estimated at 55 %. Diastolic septal flattening consistent with right ventricular overload. Diastolic filling parameters suggests grade II diastolic dysfunction. Mild aortic stenosis. Right ventricle is mildly enlarged. Right ventricular systolic function is normal, TAPSE 19 mm. Inadequate tricuspid regurgitation jet to estimate systolic pulmonary artery   pressure (SPAP). Cardiac Cath Jan 2012:   HEMODYNAMICS: RA pressure mean of 21. RV pressure is 70/26. PA pressure is 73/27. The LV pressure is 135, EDP of 8. AO pressure is 140/60. The Anh cardiac output was 6.16 L/min. The pulmonary _____ 165 _____ per second. The left ventricular function is preserved. EF is 55% to 60%, uniform wall motion. CORONARY ANGIOGRAM:   1. The right coronary artery proximally has an 80% lesion. Distally there is a 90% lesion. 2. The left main artery has luminal irregularities. The LAD is occluded proximally. 3. The circumflex artery has 2 large marginal arteries that have diffuse 40% lesion. 4. The KAVYA to the distal LAD is widely patent. 5. The SVG to the first diagonal is widely patent. 6. The SVG to the RPL is widely patent. IMPRESSION:   1. Normal left ventricular function with normal cardiac output. 2. Severely elevated left ventricular end diastolic pressure. 3. Normal cardiac output. 4. Severe pulmonary venous hypertension.

## 2022-04-04 NOTE — CARE COORDINATION
Chart reviewed, pt discussed during huddle with primary RN, pt likely needs another 2days for diuresis. PT/OT not ordered yet, would help with discharge plan, as Cardio writes consider care facility. Pt from apartment with skilled and nonskilled home care, and home oxygen. CM will continue to follow pt's progress and coordinate discharge arrangements as appropriate.   PEYMAN Mandel-RN

## 2022-04-05 LAB
ANION GAP SERPL CALCULATED.3IONS-SCNC: 10 MMOL/L (ref 3–16)
BUN BLDV-MCNC: 67 MG/DL (ref 7–20)
CALCIUM SERPL-MCNC: 9.4 MG/DL (ref 8.3–10.6)
CHLORIDE BLD-SCNC: 92 MMOL/L (ref 99–110)
CO2: 36 MMOL/L (ref 21–32)
CREAT SERPL-MCNC: 1.8 MG/DL (ref 0.8–1.3)
GFR AFRICAN AMERICAN: 46
GFR NON-AFRICAN AMERICAN: 38
GLUCOSE BLD-MCNC: 110 MG/DL (ref 70–99)
GLUCOSE BLD-MCNC: 113 MG/DL (ref 70–99)
GLUCOSE BLD-MCNC: 132 MG/DL (ref 70–99)
GLUCOSE BLD-MCNC: 140 MG/DL (ref 70–99)
GLUCOSE BLD-MCNC: 180 MG/DL (ref 70–99)
GLUCOSE BLD-MCNC: 69 MG/DL (ref 70–99)
MAGNESIUM: 2.6 MG/DL (ref 1.8–2.4)
PERFORMED ON: ABNORMAL
POTASSIUM SERPL-SCNC: 3.9 MMOL/L (ref 3.5–5.1)
SODIUM BLD-SCNC: 138 MMOL/L (ref 136–145)

## 2022-04-05 PROCEDURE — 2580000003 HC RX 258: Performed by: INTERNAL MEDICINE

## 2022-04-05 PROCEDURE — 97535 SELF CARE MNGMENT TRAINING: CPT

## 2022-04-05 PROCEDURE — 6360000002 HC RX W HCPCS: Performed by: NURSE PRACTITIONER

## 2022-04-05 PROCEDURE — 2580000003 HC RX 258: Performed by: NURSE PRACTITIONER

## 2022-04-05 PROCEDURE — 6360000002 HC RX W HCPCS: Performed by: INTERNAL MEDICINE

## 2022-04-05 PROCEDURE — 1200000000 HC SEMI PRIVATE

## 2022-04-05 PROCEDURE — 6370000000 HC RX 637 (ALT 250 FOR IP): Performed by: INTERNAL MEDICINE

## 2022-04-05 PROCEDURE — 6370000000 HC RX 637 (ALT 250 FOR IP): Performed by: NURSE PRACTITIONER

## 2022-04-05 PROCEDURE — 97530 THERAPEUTIC ACTIVITIES: CPT

## 2022-04-05 PROCEDURE — 36415 COLL VENOUS BLD VENIPUNCTURE: CPT

## 2022-04-05 PROCEDURE — 97116 GAIT TRAINING THERAPY: CPT

## 2022-04-05 PROCEDURE — 83735 ASSAY OF MAGNESIUM: CPT

## 2022-04-05 PROCEDURE — 94761 N-INVAS EAR/PLS OXIMETRY MLT: CPT

## 2022-04-05 PROCEDURE — 2700000000 HC OXYGEN THERAPY PER DAY

## 2022-04-05 PROCEDURE — 80048 BASIC METABOLIC PNL TOTAL CA: CPT

## 2022-04-05 PROCEDURE — 99233 SBSQ HOSP IP/OBS HIGH 50: CPT | Performed by: NURSE PRACTITIONER

## 2022-04-05 PROCEDURE — 6370000000 HC RX 637 (ALT 250 FOR IP): Performed by: STUDENT IN AN ORGANIZED HEALTH CARE EDUCATION/TRAINING PROGRAM

## 2022-04-05 PROCEDURE — 97166 OT EVAL MOD COMPLEX 45 MIN: CPT

## 2022-04-05 PROCEDURE — 97161 PT EVAL LOW COMPLEX 20 MIN: CPT

## 2022-04-05 RX ORDER — OXYCODONE HYDROCHLORIDE AND ACETAMINOPHEN 5; 325 MG/1; MG/1
1 TABLET ORAL ONCE
Status: COMPLETED | OUTPATIENT
Start: 2022-04-05 | End: 2022-04-05

## 2022-04-05 RX ORDER — INSULIN GLARGINE 100 [IU]/ML
30 INJECTION, SOLUTION SUBCUTANEOUS EVERY MORNING
Status: DISCONTINUED | OUTPATIENT
Start: 2022-04-06 | End: 2022-04-08

## 2022-04-05 RX ORDER — ISOSORBIDE MONONITRATE 30 MG/1
15 TABLET, EXTENDED RELEASE ORAL DAILY
Status: DISCONTINUED | OUTPATIENT
Start: 2022-04-06 | End: 2022-04-09 | Stop reason: HOSPADM

## 2022-04-05 RX ORDER — ALBUMIN (HUMAN) 12.5 G/50ML
25 SOLUTION INTRAVENOUS EVERY 8 HOURS
Status: DISCONTINUED | OUTPATIENT
Start: 2022-04-05 | End: 2022-04-06

## 2022-04-05 RX ADMIN — OXYCODONE AND ACETAMINOPHEN 1 TABLET: 5; 325 TABLET ORAL at 00:40

## 2022-04-05 RX ADMIN — INSULIN LISPRO 10 UNITS: 100 INJECTION, SOLUTION INTRAVENOUS; SUBCUTANEOUS at 08:46

## 2022-04-05 RX ADMIN — ENOXAPARIN SODIUM 30 MG: 100 INJECTION SUBCUTANEOUS at 08:42

## 2022-04-05 RX ADMIN — ENOXAPARIN SODIUM 30 MG: 100 INJECTION SUBCUTANEOUS at 22:40

## 2022-04-05 RX ADMIN — POTASSIUM CHLORIDE 10 MEQ: 10 TABLET, EXTENDED RELEASE ORAL at 08:42

## 2022-04-05 RX ADMIN — SODIUM CHLORIDE 200 MG: 9 INJECTION, SOLUTION INTRAVENOUS at 11:32

## 2022-04-05 RX ADMIN — Medication 10 ML: at 08:44

## 2022-04-05 RX ADMIN — OXYCODONE AND ACETAMINOPHEN 1 TABLET: 325; 5 TABLET ORAL at 22:33

## 2022-04-05 RX ADMIN — INSULIN LISPRO 10 UNITS: 100 INJECTION, SOLUTION INTRAVENOUS; SUBCUTANEOUS at 18:04

## 2022-04-05 RX ADMIN — CARVEDILOL 3.12 MG: 3.12 TABLET, FILM COATED ORAL at 08:42

## 2022-04-05 RX ADMIN — PREGABALIN 25 MG: 25 CAPSULE ORAL at 22:36

## 2022-04-05 RX ADMIN — OXYCODONE AND ACETAMINOPHEN 1 TABLET: 325; 5 TABLET ORAL at 12:40

## 2022-04-05 RX ADMIN — FUROSEMIDE 10 MG/HR: 10 INJECTION, SOLUTION INTRAMUSCULAR; INTRAVENOUS at 00:11

## 2022-04-05 RX ADMIN — SILVER SULFADIAZINE: 10 CREAM TOPICAL at 08:43

## 2022-04-05 RX ADMIN — ATORVASTATIN CALCIUM 80 MG: 80 TABLET, FILM COATED ORAL at 22:36

## 2022-04-05 RX ADMIN — Medication 10 ML: at 22:37

## 2022-04-05 RX ADMIN — INSULIN GLARGINE 60 UNITS: 100 INJECTION, SOLUTION SUBCUTANEOUS at 08:46

## 2022-04-05 RX ADMIN — TAMSULOSIN HYDROCHLORIDE 0.4 MG: 0.4 CAPSULE ORAL at 06:52

## 2022-04-05 RX ADMIN — INSULIN LISPRO 2 UNITS: 100 INJECTION, SOLUTION INTRAVENOUS; SUBCUTANEOUS at 22:38

## 2022-04-05 RX ADMIN — PREGABALIN 25 MG: 25 CAPSULE ORAL at 08:42

## 2022-04-05 RX ADMIN — ASPIRIN 81 MG 81 MG: 81 TABLET ORAL at 08:42

## 2022-04-05 RX ADMIN — FUROSEMIDE 5 MG/HR: 10 INJECTION, SOLUTION INTRAMUSCULAR; INTRAVENOUS at 22:28

## 2022-04-05 RX ADMIN — MICONAZOLE NITRATE: 20 CREAM TOPICAL at 22:41

## 2022-04-05 RX ADMIN — ISOSORBIDE MONONITRATE 30 MG: 30 TABLET, EXTENDED RELEASE ORAL at 08:42

## 2022-04-05 RX ADMIN — INSULIN LISPRO 3 UNITS: 100 INJECTION, SOLUTION INTRAVENOUS; SUBCUTANEOUS at 18:03

## 2022-04-05 RX ADMIN — OXYCODONE AND ACETAMINOPHEN 1 TABLET: 325; 5 TABLET ORAL at 08:42

## 2022-04-05 RX ADMIN — OXYCODONE AND ACETAMINOPHEN 1 TABLET: 325; 5 TABLET ORAL at 17:31

## 2022-04-05 RX ADMIN — OXYMETAZOLINE HCL 2 SPRAY: 0.05 SPRAY NASAL at 08:44

## 2022-04-05 ASSESSMENT — PAIN SCALES - GENERAL
PAINLEVEL_OUTOF10: 10
PAINLEVEL_OUTOF10: 9
PAINLEVEL_OUTOF10: 8
PAINLEVEL_OUTOF10: 7
PAINLEVEL_OUTOF10: 8
PAINLEVEL_OUTOF10: 7
PAINLEVEL_OUTOF10: 7
PAINLEVEL_OUTOF10: 8
PAINLEVEL_OUTOF10: 7
PAINLEVEL_OUTOF10: 7

## 2022-04-05 ASSESSMENT — PAIN DESCRIPTION - PAIN TYPE
TYPE: CHRONIC PAIN
TYPE: CHRONIC PAIN

## 2022-04-05 ASSESSMENT — PAIN - FUNCTIONAL ASSESSMENT: PAIN_FUNCTIONAL_ASSESSMENT: ACTIVITIES ARE NOT PREVENTED

## 2022-04-05 ASSESSMENT — PAIN DESCRIPTION - FREQUENCY: FREQUENCY: CONTINUOUS

## 2022-04-05 ASSESSMENT — PAIN DESCRIPTION - DESCRIPTORS: DESCRIPTORS: DISCOMFORT

## 2022-04-05 ASSESSMENT — PAIN DESCRIPTION - LOCATION
LOCATION: GENERALIZED
LOCATION: GENERALIZED

## 2022-04-05 ASSESSMENT — PAIN DESCRIPTION - ONSET: ONSET: ON-GOING

## 2022-04-05 NOTE — PLAN OF CARE
Problem: Falls - Risk of:  Goal: Will remain free from falls  Description: Will remain free from falls  Outcome: Ongoing  Note: Pt at risk for falls. Educated on fall precautions. Pt alert and oriented, calls out appropriately. Non-skid socks on. Pt remains free from falls, will continue to monitor. Problem: HEMODYNAMIC STATUS  Goal: Patient has stable vital signs and fluid balance  Outcome: Ongoing  Note:   Patient's EF (Ejection Fraction) is greater than 40%    Heart Failure Medications:  Diuretics[de-identified] Furosemide    (One of the following REQUIRED for EF </= 40%/SYSTOLIC FAILURE but MAY be used in EF% >40%/DIASTOLIC FAILURE)        ACE[de-identified] None        ARB[de-identified] None         ARNI[de-identified] None    (Beta Blockers)  NON- Evidenced Based Beta Blocker (for EF% >40%/DIASTOLIC FAILURE): None    Evidenced Based Beta Blocker::(REQUIRED for EF% <40%/SYSTOLIC FAILURE) Carvedilol- Coreg  . .................................................................................................................................................. Patient's weights and intake/output reviewed: Yes    Patient's Last Weight: 291 lbs obtained by standing scale. Difference of 2 lbs less than last documented weight.       Intake/Output Summary (Last 24 hours) at 4/5/2022 0424  Last data filed at 4/5/2022 0422  Gross per 24 hour   Intake 1080 ml   Output 3900 ml   Net -2820 ml       Comorbidities Reviewed Yes    Patient has a past medical history of Anemia, Angina, Arthritis, CAD (coronary artery disease), CHF (congestive heart failure) ([facility] [address] CKD (chronic kidney disease) stage 3, GFR 30-59 ml/min ([facility]), Clostridium difficile diarrhea, Diabetes mellitus ([facility] [address] Diabetic neuropathy ([facility] [address] Disease of blood and blood forming organ, Dizziness, GERD (gastroesophageal reflux disease), Headache, Hearing loss, Hyperlipidemia, Hypertension, Kidney stone, Refusal of blood transfusions as patient is Gnosticist, Sleep apnea, Tinnitus, Venous ulcer ([facility] [address] and Wound, open. >>For CHF and Comorbidity documentation on Education Time and Topics, please see Education Tab    Progressive Mobility Assessment:  What is this patient's Current Level of Mobility?: Ambulatory- with Assistance  How was this patient Mobilized today?: Edge of Bed, Up to Chair, Bedside Commode,  Up to Toilet/Shower, and Up in Room, ambulated 10 ft                 With Whom? Nurse                 Level of Difficulty/Assistance: Independent     Pt resting in bed at this time on  4.5 L O2. Pt denies shortness of breath. Pt with nonpitting lower extremity edema. Patient and/or Family's stated Goal of Care this Admission: reduce shortness of breath, increase activity tolerance, better understand heart failure and disease management, be more comfortable, and reduce lower extremity edema prior to discharge        :       Problem: Serum Glucose Level - Abnormal:  Goal: Ability to maintain appropriate glucose levels will improve  Description: Ability to maintain appropriate glucose levels will improve  Outcome: Ongoing  Note: Pt at risk for elevated blood glucose levels r/t DM. Pt will have glucose levels monitored prior to breakfast, lunch, dinner, and bedtime. Elevated levels will be treated via SSI. Pt understands the need to monitor levels and has no further complaints at this time.

## 2022-04-05 NOTE — PROGRESS NOTES
Paged. Dr. Ginny Ibarra - patient is refusing IV albumin because it is a blood product and he is a Yazdanism. Thanks. \"

## 2022-04-05 NOTE — PLAN OF CARE
Problem: OXYGENATION/RESPIRATORY FUNCTION  Goal: Patient will maintain patent airway  Outcome: Ongoing  Note:   Patient's EF (Ejection Fraction) is greater than 40%    Heart Failure Medications:  Diuretics[de-identified] Furosemide    (One of the following REQUIRED for EF </= 40%/SYSTOLIC FAILURE but MAY be used in EF% >40%/DIASTOLIC FAILURE)        ACE[de-identified] None        ARB[de-identified] None         ARNI[de-identified] None    (Beta Blockers)  NON- Evidenced Based Beta Blocker (for EF% >40%/DIASTOLIC FAILURE): None    Evidenced Based Beta Blocker::(REQUIRED for EF% <40%/SYSTOLIC FAILURE) Carvedilol- Coreg  . .................................................................................................................................................. Patient's weights and intake/output reviewed: Yes    Patient's Last Weight: 289 lbs obtained by standing scale. Difference of 2 lbs less than last documented weight. Intake/Output Summary (Last 24 hours) at 4/5/2022 1006  Last data filed at 4/5/2022 0133  Gross per 24 hour   Intake 840 ml   Output 2900 ml   Net -2060 ml       Comorbidities Reviewed Yes    Patient has a past medical history of Anemia, Angina, Arthritis, CAD (coronary artery disease), CHF (congestive heart failure) ([facility] [address] CKD (chronic kidney disease) stage 3, GFR 30-59 ml/min ([facility]), Clostridium difficile diarrhea, Diabetes mellitus ([facility] [address] Diabetic neuropathy ([facility] [address] Disease of blood and blood forming organ, Dizziness, GERD (gastroesophageal reflux disease), Headache, Hearing loss, Hyperlipidemia, Hypertension, Kidney stone, Refusal of blood transfusions as patient is Sabianism, Sleep apnea, Tinnitus, Venous ulcer ([facility] [address] and Wound, open.      >>For CHF and Comorbidity documentation on Education Time and Topics, please see Education Tab    Progressive Mobility Assessment:  What is this patient's Current Level of Mobility?: Ambulatory- with Assistance  How was this patient Mobilized today?: Up in Room and Up in Hallway, ambulated 50 ft                 With Whom? PT and OT                 Level of Difficulty/Assistance: 1x Assist     Pt sitting in bed at this time on  4.5 L O2. Pt denies shortness of breath. Pt with pitting lower extremity edema.      Patient and/or Family's stated Goal of Care this Admission: reduce shortness of breath, increase activity tolerance, better understand heart failure and disease management, be more comfortable, and reduce lower extremity edema prior to discharge        :

## 2022-04-05 NOTE — PROGRESS NOTES
Occupational Therapy   Occupational Therapy Initial Assessment  Date: 2022   Patient Name: Johanna Benavidez  MRN: [id number]     : 1955    Date of Service: 2022    Discharge Recommendations:  Subacute/Skilled Nursing Facility       Assessment   Performance deficits / Impairments: Decreased functional mobility ; Decreased safe awareness;Decreased balance;Decreased ADL status; Decreased endurance;Decreased high-level IADLs  Assessment: Pt tolerated OT evaluation well. Pt reports that he has been experiencing a functional decline over the last several months and it is becoming increasingly difficult to complete I/ADLs at home. Pt is current with home health care and is planning to move to [facility] in the future. At baseline pt uses 4L O2. Pt currently requiring CGA with simple transfers. Pt unsteady and reaching for furniture during ambulation short distances with SPC requiring CGA. Pt with improved steadiness and tolerance using RW with CGA up to 150 feet. Pt currently requiring Mod-Max A with dressing, Min A toileting, SBA grooming. Pt requiring 5L O2 for SpO2 to remains >90% this date. Pt is below baseline level of occupational performance. Recommend continued OT services to increase safety and independence with ADLs and functional transfers. Anticipate pt will require SNF for continued OT services at discharge. Prognosis: Fair  Decision Making: Medium Complexity    OT Education: OT Role;Transfer Training;Equipment;Plan of Care;ADL Adaptive Strategies  Patient Education: home safety concerns, OT discharge recommendations, standing balance, recommend use of RW for ambulation, toilet transfers, safety with toileting  Barriers to Learning: pt demonstrates and verbalizes understanding.   REQUIRES OT FOLLOW UP: Yes    Activity Tolerance  Activity Tolerance: Patient Tolerated treatment well  Activity Tolerance: Vitals seated: 155/72, 70bpm, SpO2 remains >90% on 5L O2  Safety Devices  Safety Devices in place: Yes  Type of devices: Left in bed;Call light within reach;Nurse notified           Patient Diagnosis(es): The primary encounter diagnosis was Acute on chronic congestive heart failure, unspecified heart failure type ([address] Diagnoses of Acute respiratory failure with hypoxia (HCC) and Acute on chronic renal insufficiency were also pertinent to this visit. has a past medical history of Anemia, Angina, Arthritis, CAD (coronary artery disease), CHF (congestive heart failure) ([address] CKD (chronic kidney disease) stage 3, GFR 30-59 ml/min (HCC), Clostridium difficile diarrhea, Diabetes mellitus ([address] Diabetic neuropathy ([facility] [address] Disease of blood and blood forming organ, Dizziness, GERD (gastroesophageal reflux disease), Headache, Hearing loss, Hyperlipidemia, Hypertension, Kidney stone, Refusal of blood transfusions as patient is Evangelical, Sleep apnea, Tinnitus, Venous ulcer ([address] and Wound, open. has a past surgical history that includes hernia repair (age1); Cholecystectomy (9/2011); other surgical history (11-16-11 REPAIR LOWER STERNAL INCISION, REMOVAL OF ONE STERNAL WIRE,); Upper gastrointestinal endoscopy; Cardiac surgery; other surgical history (10/10/2014); and Pain management procedure (N/A, 2/10/2022). Restrictions  Restrictions/Precautions  Restrictions/Precautions: Up as Tolerated,General Precautions  Position Activity Restriction  Other position/activity restrictions: up as tolerated, O2, wounds BLE    Subjective   General  Chart Reviewed: Yes  Patient assessed for rehabilitation services?: Yes  Additional Pertinent Hx: arthritis, CAD, CHf, DM, neuropathy, anemia, CKd, hearing loss, CABG x3, hernia repair, GARY, venous ulcer, obesity. Family / Caregiver Present: No    Diagnosis: Pt presents with SOB. Pt admitted with acute on chronic diastolic heart failure, moderate aortic stenosis, elevated troponin, MARGARITO, acute hypoxic respiratory failure.     Subjective  Subjective: Pt seated EOB Balance: Contact guard assistance (SPC and RW)  Standing Balance  Time: 2-3 minutes x2  Activity: bathroom mobility, prn toileting, grooming at sink, ambulation 12 feet x2 using SPC, ambulation 150 feet with RW  Comment: CGA using SPC with cues for safety. Pt reaching for furniture with use of SPC. CGA using RW    Functional Mobility  Functional - Mobility Device: Rolling Walker  Activity: To/from bathroom; Other  Functional Mobility Comments: CGA using SPC 12 feet x2 with cues for safety. Pt unsteady and reaching for furniture with use of SPC. CGA using  feet. Pt using 5L O2    Toilet Transfers  Toilet - Technique: Ambulating  Equipment Used: Grab bars  Toilet Transfer: Contact guard assistance  Toilet Transfers Comments: SPC for approach, grab bar to/from toilet    ADL  Feeding: Independent  Grooming: Stand by assistance (standing at sink to wash hands)  UE Dressing: Moderate assistance  LE Dressing: Maximum assistance  Toileting: Minimal assistance     Tone RUE  RUE Tone: Normotonic  Tone LUE  LUE Tone: Normotonic  Coordination  Movements Are Fluid And Coordinated: Yes           Transfers  Stand Step Transfers: Contact guard assistance  Sit to stand: Contact guard assistance  Stand to sit: Contact guard assistance     Vision - Basic Assessment  Prior Vision: Wears glasses only for reading  Visual History: Glaucoma; Cataracts;Surgical intervention  Vision Comments: pt reports decreased peripheral vision. Pt reports low vision L eye due to glaucoma. Cognition  Overall Cognitive Status: WFL           Sensation  Overall Sensation Status: Impaired  Light Touch: Severe deficits in the RUE;Severe deficits in the LUE;Severe deficits in the RLE; Severe deficits in the LLE  Additional Comments: Pt reports numbness and tingling BUE/BLE        LUE AROM (degrees)  LUE AROM : WFL  RUE AROM (degrees)  RUE AROM : WFL     LUE Strength  Gross LUE Strength: Exceptions to [facility]  L Shoulder Flex: 4/5  L Elbow Flex: 4+/5  L Elbow Ext: 4+/5  L Hand General: 4+/5  RUE Strength  Gross RUE Strength: Exceptions to [facility]  R Shoulder Flex: 4/5  R Elbow Flex: 4+/5  R Elbow Ext: 4+/5  R Hand General: 4+/5                   Plan   Plan  Times per week: 3-5x/week  Current Treatment Recommendations: Strengthening,Endurance Training,Patient/Caregiver Education & Training,Equipment Evaluation, Education, & procurement,Self-Care / ADL,Balance Training,Gait Training,Home Management Training,Pain Management,Functional Mobility Training,Safety Education & Training,Positioning    AM-PAC Score        AM-PAC Inpatient Daily Activity Raw Score: 16 (04/05/22 1104)  AM-PAC Inpatient ADL T-Scale Score : 35.96 (04/05/22 1104)  ADL Inpatient CMS 0-100% Score: 53.32 (04/05/22 1104)  ADL Inpatient CMS G-Code Modifier : CK (04/05/22 1104)    Goals  Short term goals  Time Frame for Short term goals: 2 weeks (4/19/22)  Short term goal 1: Mod I functional tranfers to/from EOB, chair, toilet  Short term goal 2: SBA LB dressing with AE as needed  Short term goal 3: Mod I toileting  Short term goal 4: Mod I grooming in stance at sink       Therapy Time   Individual Concurrent Group Co-treatment   Time In 0856         Time Out 0942         Minutes 46         Timed Code Treatment Minutes: 31 Minutes (15 minute evaluation)     If pt is discharged prior to next OT session, this note will serve as the discharge summary.     Chang Castellano, OT

## 2022-04-05 NOTE — PROGRESS NOTES
Hospitalist Progress Note  4/5/2022 1:07 PM  Subjective:   Admit Date: 3/31/2022  PCP: Jaspreet Jacome CNP Status:  Inpatient  Interval History: Hospital Day: 6, admitted with acute hypoxic respiratory failure (10 LPM oxygen on baseline 4 LPM) secondary to diastolic heart failure and refractory to increased oral diuretic prior to admission on furosemide gtt exacerbated by acute kidney injury on CKD stage 3a and type 2 diabetes with episodic hypoglycemia on insulin. Epistaxis from nasal cannula oxygen, left nostril, similar to previous episode requiring cauterization with Silver nitrate. Subjective: feels well. Still with exertional dyspnea but overall better than on admission. Most pressing concern is hypoglycemia which he continues to feel and experience during this admission. We suspect it is related to change in diet while inpt. Pt takes lantus 40 BID with 20 units for lunch. No lows at home    Diet: controlled carbohydrate (75 gm/meal)  Medications:   Furosemide at 5 mg/hr  linaclotide  290 mcg Oral QAM AC   insulin lispro  10 Units SubCUTAneous TID WC   tamsulosin  0.4 mg Oral Daily   aspirin  81 mg Oral Daily   atorvastatin  80 mg Oral Nightly   carvedilol  6.25 mg Oral BID WC   ferrous sulfate  325 mg Oral TID WC   insulin glargine  80 Units SubCUTAneous BID   isosorbide mononitrate  30 mg Oral Daily   oxycodone acetaminophen  1 tablet Oral 4x Daily   pregabalin  25 mg Oral BID   enoxaparin  30 mg SubCUTAneous BID   insulin lispro SSI   10 + 0-18 Units SubCUTAneous TID WC     Recent Labs     04/03/22  0844   WBC 5.4   HGB 10.9*      MCV 88.2     Recent Labs     04/03/22  0844 04/04/22  0720 04/05/22  0651    137 138   K 3.8 3.6 3.9   CL 89* 90* 92*   CO2 39* 38* 36*   BUN 74* 65* 67*   CREATININE 1.5* 1.4* 1.8*   GLUCOSE 70 201* 132*     No results for input(s): AST, ALT, ALB, BILITOT, ALKPHOS in the last 72 hours.   Troponin T:   No results for input(s): TROPONINI in the last 72 hours.  INR (3/31) 1.29  proBNP (3/21) 2,358 pg/mL  Magnesium (4/2) 3.00 mg/dL  Fe / TIBC (4/1) 42 / 268 = 16% iron saturation      POC Glucose:   Recent Labs     04/04/22  1926 04/05/22  0752 04/05/22  1143 04/05/22  1238   POCGLU 110* 113* 69* 110*     TTE (1/13/22)  Limited only f/u for LVEF and aortic valve stenosis. Technically difficult examination.  Low normal left ventricular systolic function with ejection fraction of 50%. Moderate aortic stenosis. Aortic stenosis values appear approximately same when compared to echo on 8-. Mild to moderate aortic regurgitation. Objective:   Vitals:  /69   Pulse 65   Temp 99.1 °F (37.3 °C) (Oral)   Resp 18   Ht 5' 9\" (1.753 m)   Wt 289 lb 8 oz (131.3 kg)   SpO2 95%   BMI 42.75 kg/m²   General appearance: alert and cooperative with exam  Lungs: 4.5L NC. Improved aeration bilaterally  Heart: rrr 2/6 systolic murmur noted worst on inspiration   Abdomen: soft, non-tender; bowel sounds normal; no masses,  no organomegaly  Extremities: 2+pitting edema bilaterally with chronic venous insufficiency bilaterally, Left leg with weeping wound  Neurologic: No obvious focal neurologic deficits. Assessment and Plan:     Acute hypoxic respiratory failure initially on 10 LPM and now down to baseline 4 LPM with diuresis. Improving. Asked nursing to wean oxygen to >90% with least amount of oxygen. Diastolic heart failure on furosemide gtt. Continue carvedilol 6.25 mg BID, maintain SBP > 100. ACE/ARB contraindicated by MARGARITO/CKD. Acute kidney injury on CKD stage 3a. Improving. (baseline 1.5-1.7). Followed by nephrology. CAD on aspirin, statin, and beta blocker. Prior CABG 2010, [facility] in 2012 with patent grafts, mixed ischemia and scar on stress test March 2022 - medical management. Elevated troponin likely supply / demand mismatch. No ischemic evaluation at this time per cardiology.      Iron deficiency anemia in setting of heart failure:  Venofer 200 mg IV daily x 3 days. He received 3 doses of IV Venofer in 2021. He has received IV iron infusions in the past. NO blood products. Type 2 Diabetes (controlled, HgbA1c 7.0%) with episodes of hypoglycemia. Cover with a \"sliding scale\" lispro moderate scale prandial correction insulin. Complicated by episodes of hypoglycemia. Adjusted basal insulin today to wt based dosing. Dyslipidemia (LDL 65) on atorvastatin 80 mg nightly. Opioid induced constipation on linaclotide. HENNA baseline. Class III obesity (BMI 14/5) complicates medical management. Epistaxis- likely related to dry air from continuous O2. Resolving. Trial afrin x 3 days    Advance Directive: Full Code  DVT prophylaxis with enoxaparin 30 mg sub-Q BID. Discharge plannin-2 more days inpatient status.   Will need to determine home diuretic regimen      Florence Aly MD  [address] [place] [address]

## 2022-04-05 NOTE — FLOWSHEET NOTE
Cross cover page:  Pt c/o 9/10 LLE pain d/t a venous ulcer and his percocet is scheduled for 4x's daily and is not due to have it again till 9am.  Can something be ordered for now? Thanks      Addendum:  1x dose of percocet ordered and administered. Will continue to monitor.

## 2022-04-05 NOTE — PROGRESS NOTES
Physical Therapy    Facility/Department: [facility] A2 CARD TELEMETRY  Initial Assessment    NAME: Xavier Blair  : 1955  MRN: [id number]    Date of Service: 2022    Discharge Recommendations:  Subacute/Skilled Nursing Facility   PT Equipment Recommendations  Equipment Needed: No    Assessment   Body structures, Functions, Activity limitations: Decreased functional mobility ; Decreased balance;Decreased posture;Decreased strength;Decreased endurance;Decreased coordination  Assessment: Pt is 76 yo male who presents with diagnosis of renal insufficiency and SOB. Pt mod I with cane or 4WW at baseline. Grossly CGA for mobility with RW this date. Limited d/t decreased activity tolerance and impaired balance. Pt appears to be having difficulty caring for himself at home. Pt would benefit from continued skilled therapy to address deficits. Recommend SNF at d/t due to fall risk, impaired strength, and difficulty caring for himself at home alone. Treatment Diagnosis: impaired functional mobility  Specific instructions for Next Treatment: progress mobility as tolerated  Prognosis: Good  Decision Making: Low Complexity  PT Education: Goals; General Safety;Gait Training;PT Role;Disease Specific Education;Plan of Care; Functional Mobility Training;Transfer Training  Patient Education: Pt educated on role of PT, importance of OOB mobility, safe mobility with AD -- pt verbalized understanding  Barriers to Learning: none  REQUIRES PT FOLLOW UP: Yes  Activity Tolerance  Activity Tolerance: Patient Tolerated treatment well;Patient limited by fatigue;Patient limited by endurance  Activity Tolerance: /72, HR 70; SpO2 96% on 5L       Patient Diagnosis(es): The primary encounter diagnosis was Acute on chronic congestive heart failure, unspecified heart failure type ([facility] [address] Diagnoses of Acute respiratory failure with hypoxia (HCC) and Acute on chronic renal insufficiency were also pertinent to this visit.      has a past medical history of Anemia, Angina, Arthritis, CAD (coronary artery disease), CHF (congestive heart failure) ([facility] [address] CKD (chronic kidney disease) stage 3, GFR 30-59 ml/min ([facility]), Clostridium difficile diarrhea, Diabetes mellitus ([facility] [address] Diabetic neuropathy ([facility] [address] Disease of blood and blood forming organ, Dizziness, GERD (gastroesophageal reflux disease), Headache, Hearing loss, Hyperlipidemia, Hypertension, Kidney stone, Refusal of blood transfusions as patient is Adventism, Sleep apnea, Tinnitus, Venous ulcer ([facility] [address] and Wound, open. has a past surgical history that includes hernia repair (age1); Cholecystectomy (9/2011); other surgical history (11-16-11 REPAIR LOWER STERNAL INCISION, REMOVAL OF ONE STERNAL WIRE,); Upper gastrointestinal endoscopy; Cardiac surgery; other surgical history (10/10/2014); and Pain management procedure (N/A, 2/10/2022).     Restrictions  Restrictions/Precautions  Restrictions/Precautions: Up as Tolerated,General Precautions  Position Activity Restriction  Other position/activity restrictions: up as tolerated, O2, wounds BLE  Vision/Hearing  Vision: Impaired  Vision Exceptions: Wears glasses for reading (low vision, no vision in right eye)  Hearing: Exceptions to [facility]  Hearing Exceptions: Hard of hearing/hearing concerns     Subjective  General  Chart Reviewed: Yes  Patient assessed for rehabilitation services?: Yes  Response To Previous Treatment: Not applicable  Family / Caregiver Present: No  Referring Practitioner: Dr. Ben Engle MD  Referral Date : 04/05/22  Diagnosis: Acute on Chronic renal insufficiency; SOB  Follows Commands: Within Functional Limits  General Comment  Comments: Pt seated EOB on approach; RN cleared pt for therapy  Subjective  Subjective: pt agreeable to therapy  Pain Screening  Patient Currently in Pain: Yes  Pain Assessment  Pain Assessment: 0-10  Pain Level: 7  Pain Type: Chronic pain  Pain Location: Generalized  Non-Pharmaceutical Pain Intervention(s): Ambulation/Increased Activity;Repositioned  Response to Pain Intervention: Patient Satisfied    Orientation  Orientation  Overall Orientation Status: Within Functional Limits  Social/Functional History  Social/Functional History  Lives With: Alone  Type of Home: Apartment  Home Layout: One level  Home Access: Stairs to enter with rails  Entrance Stairs - Number of Steps: 1+1  Bathroom Shower/Tub: Tub/Shower unit  Bathroom Toilet: Standard  Bathroom Equipment: Grab bars in shower,Grab bars around toilet  Home Equipment: 4 wheeled walker,Cane  ADL Assistance: Independent  Homemaking Assistance: Needs assistance  Ambulation Assistance: Independent  Transfer Assistance: Independent  Additional Comments: Pt is on waitlist for [facility]. Pt current with home health PT/OT/RN. Pt has an aide every 2 weeks that help with housekeeping. Friend completes grocery shopping. Pt reports that he only complete simple meal prep. Pt reports that he has been struggling to complete ADLs recently but has beeen managing the best he can. Pt has been sponge bathing at home. Pt ambulates with [id number]. Pt reports using 2.5-4L O2 at baseline. Pt denies any falls in the last year. Objective   AROM RLE (degrees)  RLE AROM: WFL  RLE General AROM: PF/DF decreased ~25%  AROM LLE (degrees)  LLE AROM : WFL  LLE General AROM: PF/DF decreased ~25%  Strength RLE  Comment: Grossly 4/5 throughout  Strength LLE  Comment: Grossly 4/5 throughout        Sensation  Light Touch: Severe deficits in the RUE;Severe deficits in the LUE;Severe deficits in the RLE; Severe deficits in the LLE  Additional Comments: Pt reports numbness and tingling BUE/BLE     Bed mobility  Supine to Sit: Unable to assess  Sit to Supine: Unable to assess  Comment: pt seated EOB at start and end of session     Transfers  Sit to Stand: Contact guard assistance (x 2 reps from EOB with cues for safe hand placement)  Stand to sit: Contact guard assistance     Ambulation  Ambulation?: Yes  More with LRAD. Short term goal 3: Pt will ambulate 150 ft with SBA and LRAD. Short term goal 4: 4/09: Pt will partcipate in 12-15 reps of BLE exercises to promote strength and activity tolerance. Patient Goals   Patient goals : \"to walk and get stronger\"       Therapy Time   Individual Concurrent Group Co-treatment   Time In 0856         Time Out 0945         Minutes 49         Timed Code Treatment Minutes: Zelda 15, PT, DPT  If pt is unable to be seen after this session, please let this note serve as discharge summary. Please see case management note for discharge disposition. Thank you.

## 2022-04-05 NOTE — PLAN OF CARE
Problem: Falls - Risk of:  Goal: Will remain free from falls  Description: Will remain free from falls  4/5/2022 1005 by Alfa Millan RN  Outcome: Ongoing  Note: Pt will remain free from falls throughout hospital stay. Fall precautions in place, patient is refusing the bed alarm, bed in lowest position with wheels locked and side rails 2/4 up. Room door open and hourly rounding completed. Will continue to monitor throughout shift. Problem: Skin Integrity:  Goal: Will show no infection signs and symptoms  Description: Will show no infection signs and symptoms  Outcome: Ongoing  Note: Pt is at risk for skin breakdown. Pt will have skin assessments every shift, encourage safe ambulation, wound care orders followed, heels elevated off of the bed, and friction and shear prevented when possible. Will continue to monitor for signs of skin breakdown and enforce prevention measures. Problem: Pain:  Goal: Pain level will decrease  Description: Pain level will decrease  Outcome: Ongoing  Note: Pt will be satisfied with pain control with scheduled percocet. Pt uses numeric pain rating scale with reassessments after pain med administration. Will continue to monitor progression throughout shift.

## 2022-04-05 NOTE — PROGRESS NOTES
[facility]   Daily Progress Note    Admit Date:  3/31/2022  HPI:    Chief Complaint   Patient presents with    Shortness of Breath     medics report shortness of breath, pt on home oxygen 4 liters, pt placed on non rebreather. pt wheezing. pt given douneb. 10 liters  bs 268    Leg Problem     pt seeing wound care for left leg wheeping. pt reports this was tuesday. Illy Smoke resented with worsening shortness of breath. Hx of dCHF, CMP, CAD s/p CABG 2010, moderate AS, HTN, HLD as well as CKD, DM, HENNA, obesity. Subjective:  Mr. Nestor Granger seen up walking in [place] with therapy. Breathing and edema better. Still focused on blood sugars and carb diet. He reports good urine output overnight.      Objective:   Patient Vitals for the past 24 hrs:   BP Temp Temp src Pulse Resp SpO2 Weight   04/05/22 1144 126/69 99.1 °F (37.3 °C) Oral 65 18 95 % --   04/05/22 0841 127/67 98.4 °F (36.9 °C) Oral 66 18 97 % --   04/05/22 0328 135/77 98.2 °F (36.8 °C) Oral 64 18 94 % 289 lb 8 oz (131.3 kg)   04/04/22 2337 (!) 90/43 97.8 °F (36.6 °C) Oral 80 18 95 % --   04/04/22 2034 (!) 118/59 97.8 °F (36.6 °C) Oral 60 18 98 % --       Intake/Output Summary (Last 24 hours) at 4/5/2022 1153  Last data filed at 4/5/2022 0852  Gross per 24 hour   Intake 600 ml   Output 2100 ml   Net -1500 ml     Wt Readings from Last 3 Encounters:   04/05/22 289 lb 8 oz (131.3 kg)   03/14/22 282 lb 9.6 oz (128.2 kg)   02/10/22 280 lb (127 kg)        Date/Time Weight Weight Method     04/05/22 03:28:48 289 lb 8 oz (131.3 kg) Standing scale     04/04/22 03:12:25 291 lb (132 kg) Standing scale     04/03/22 03:26:10 293 lb (132.9 kg) Standing scale     04/02/22 0329 292 lb (132.5 kg) Standing scale     04/01/22 0015 296 lb 8 oz (134.5 kg) Standing scale             WEIGHTS LAST HOSPITAL STAY:    03/14/22 04:26:12 282 lb 9.6 oz (128.2 kg) Actual;Standing scale     03/13/22 0430 283 lb 1.6 oz (128.4 kg) Actual;Standing scale     03/12/22 IntraVENous Q8H    insulin glargine  60 Units SubCUTAneous BID    carvedilol  3.125 mg Oral BID     linaclotide  290 mcg Oral QAM AC    iron sucrose (VENOFER) iv piggyback 100 mL (Admin over 60 minutes)  200 mg IntraVENous Q24H    insulin lispro  10 Units SubCUTAneous TID     tamsulosin  0.4 mg Oral Daily    miconazole   Topical BID    aspirin  81 mg Oral Daily    atorvastatin  80 mg Oral Nightly    isosorbide mononitrate  30 mg Oral Daily    oxyCODONE-acetaminophen  1 tablet Oral 4x Daily    silver sulfADIAZINE   Topical Daily    pregabalin  25 mg Oral BID    sodium chloride flush  5-40 mL IntraVENous 2 times per day    enoxaparin  30 mg SubCUTAneous BID    insulin lispro  0-18 Units SubCUTAneous TID     insulin lispro  0-9 Units SubCUTAneous Nightly      sodium chloride      dextrose      furosemide (LASIX) 1mg/ml infusion 5 mg/hr (04/05/22 0947)       Lab Data: Lab results independently reviewed and analyzed by myself 4/5/2022    CBC:   Recent Labs     04/03/22  0844   WBC 5.4   HGB 10.9*        BMP:    Recent Labs     04/03/22  0844 04/04/22  0720 04/05/22  0651    137 138   K 3.8 3.6 3.9   CO2 39* 38* 36*   BUN 74* 65* 67*   CREATININE 1.5* 1.4* 1.8*     INR:    No results for input(s): INR in the last 72 hours. BNP:    Recent Labs     04/03/22  0844   PROBNP 1,899*     Cardiac Enzymes:   No results for input(s): TROPONINI in the last 72 hours. Lipids:   Lab Results   Component Value Date    TRIG 92 04/01/2022    TRIG 110 08/24/2021    HDL 30 04/01/2022    HDL 33 08/24/2021    HDL 31 01/11/2012    HDL 27 07/27/2010    LDLCALC 65 04/01/2022    LDLCALC 49 08/24/2021       Cardiac Imaging:    Stress Test: 3/10/22  Summary    Abnormal moderate risk myocardial perfusion study. There is a large area of mixed ischemia and scar within the infero-apical,    apical-lateral, lateral, posterior-lateral, and posterior-basal segments.     The left ventricular size is moderately dilated. The estimated left ventricular function is 67%. Echo: 1/13/22   Summary   Limited only f/u for LVEF and aortic valve stenosis. Technically difficult examination. Low normal left ventricular systolic function with ejection fraction of 50%. Moderate aortic stenosis. Aortic stenosis values appear approximately same when compared to echo on   8-. Mild to moderate aortic regurgitation. ECHO: 9/20/16  Left ventricular systolic function is normal with ejection fraction   estimated at 55 %. Diastolic septal flattening consistent with right ventricular overload. Diastolic filling parameters suggests grade II diastolic dysfunction. Mild aortic stenosis. Right ventricle is mildly enlarged. Right ventricular systolic function is normal, TAPSE 19 mm. Inadequate tricuspid regurgitation jet to estimate systolic pulmonary artery   pressure (SPAP). Cardiac Cath Jan 2012:   HEMODYNAMICS: RA pressure mean of 21. RV pressure is 70/26. PA pressure is 73/27. The LV pressure is 135, EDP of 8. AO pressure is 140/60. The Anh cardiac output was 6.16 L/min. The pulmonary _____ 165 _____ per second. The left ventricular function is preserved. EF is 55% to 60%, uniform wall motion. CORONARY ANGIOGRAM:   1. The right coronary artery proximally has an 80% lesion. Distally there is a 90% lesion. 2. The left main artery has luminal irregularities. The LAD is occluded proximally. 3. The circumflex artery has 2 large marginal arteries that have diffuse 40% lesion. 4. The KAVYA to the distal LAD is widely patent. 5. The SVG to the first diagonal is widely patent. 6. The SVG to the RPL is widely patent. IMPRESSION:   1. Normal left ventricular function with normal cardiac output. 2. Severely elevated left ventricular end diastolic pressure. 3. Normal cardiac output. 4. Severe pulmonary venous hypertension.

## 2022-04-05 NOTE — PROGRESS NOTES
[facility] Progress Note           Subjective/   75y.o. year old male who we are seeing in consultation for MARGARITO/CKD-3. HPI:  The patient was seen and examined; he feels well today with no CP, SOB, nausea or vomiting. ROS: No fever or chills. Social: Family at bedside.     Objective/   GEN:  Chronically ill, /67   Pulse 66   Temp 98.4 °F (36.9 °C) (Oral)   Resp 18   Ht 5' 9\" (1.753 m)   Wt 289 lb 8 oz (131.3 kg)   SpO2 97%   BMI 42.75 kg/m²      HEENT: non-icteric, no JVD  CV: S1, S2 without m/r/g; ++ LE edema  RESP: CTA B without w/r/r; breathing wnl  ABD: +bs, soft, nt, no hsm  SKIN: warm, no rashes    Data/  Recent Labs     04/03/22  0844   WBC 5.4   HGB 10.9*   HCT 33.9*   MCV 88.2        Recent Labs     04/03/22  0844 04/04/22  0720 04/05/22  0651    137 138   K 3.8 3.6 3.9   CL 89* 90* 92*   CO2 39* 38* 36*   GLUCOSE 70 201* 132*   MG 3.20* 2.70* 2.60*   BUN 74* 65* 67*   CREATININE 1.5* 1.4* 1.8*   LABGLOM 47* 50* 38*   GFRAA 56* >60 46*       Assessment/     - Acute on chronic kidney disease stage 3              Worsening in setting of CHF decompensation, hx of volatile fluctuations in levels   based on volume status & diuretic use              Background DM Nephropathy + Cardiorenal syndrome              Baseline SCr mid 1 range, 1.5-1.7              Follows with Dr. Cristiano Milligan in office   Improving with diuresis                - dCHF exacerbation     - DM2 - poorly controlled     - Normocytic anemia, chronic - Hb at target     - Confucianist    Plan/     - Decrease Lasix drip to 5 mg/hour  - Attempt a trial of IV Albumin   - Trend labs, weights, bp's, & urine output

## 2022-04-06 LAB
ANION GAP SERPL CALCULATED.3IONS-SCNC: 8 MMOL/L (ref 3–16)
BUN BLDV-MCNC: 64 MG/DL (ref 7–20)
CALCIUM SERPL-MCNC: 9.2 MG/DL (ref 8.3–10.6)
CHLORIDE BLD-SCNC: 95 MMOL/L (ref 99–110)
CO2: 34 MMOL/L (ref 21–32)
CREAT SERPL-MCNC: 1.6 MG/DL (ref 0.8–1.3)
GFR AFRICAN AMERICAN: 52
GFR NON-AFRICAN AMERICAN: 43
GLUCOSE BLD-MCNC: 103 MG/DL (ref 70–99)
GLUCOSE BLD-MCNC: 126 MG/DL (ref 70–99)
GLUCOSE BLD-MCNC: 127 MG/DL (ref 70–99)
GLUCOSE BLD-MCNC: 134 MG/DL (ref 70–99)
GLUCOSE BLD-MCNC: 146 MG/DL (ref 70–99)
GLUCOSE BLD-MCNC: 72 MG/DL (ref 70–99)
PERFORMED ON: ABNORMAL
PERFORMED ON: NORMAL
POTASSIUM SERPL-SCNC: 3.9 MMOL/L (ref 3.5–5.1)
PRO-BNP: 1089 PG/ML (ref 0–124)
SODIUM BLD-SCNC: 137 MMOL/L (ref 136–145)

## 2022-04-06 PROCEDURE — 36415 COLL VENOUS BLD VENIPUNCTURE: CPT

## 2022-04-06 PROCEDURE — 80048 BASIC METABOLIC PNL TOTAL CA: CPT

## 2022-04-06 PROCEDURE — 6370000000 HC RX 637 (ALT 250 FOR IP): Performed by: NURSE PRACTITIONER

## 2022-04-06 PROCEDURE — 2580000003 HC RX 258: Performed by: INTERNAL MEDICINE

## 2022-04-06 PROCEDURE — 6360000002 HC RX W HCPCS: Performed by: INTERNAL MEDICINE

## 2022-04-06 PROCEDURE — 6370000000 HC RX 637 (ALT 250 FOR IP): Performed by: STUDENT IN AN ORGANIZED HEALTH CARE EDUCATION/TRAINING PROGRAM

## 2022-04-06 PROCEDURE — 97535 SELF CARE MNGMENT TRAINING: CPT

## 2022-04-06 PROCEDURE — 83880 ASSAY OF NATRIURETIC PEPTIDE: CPT

## 2022-04-06 PROCEDURE — 99233 SBSQ HOSP IP/OBS HIGH 50: CPT | Performed by: INTERNAL MEDICINE

## 2022-04-06 PROCEDURE — 6370000000 HC RX 637 (ALT 250 FOR IP): Performed by: INTERNAL MEDICINE

## 2022-04-06 PROCEDURE — 1200000000 HC SEMI PRIVATE

## 2022-04-06 PROCEDURE — 97116 GAIT TRAINING THERAPY: CPT

## 2022-04-06 PROCEDURE — 6360000002 HC RX W HCPCS: Performed by: NURSE PRACTITIONER

## 2022-04-06 PROCEDURE — 2580000003 HC RX 258: Performed by: NURSE PRACTITIONER

## 2022-04-06 RX ORDER — OXYCODONE HYDROCHLORIDE AND ACETAMINOPHEN 5; 325 MG/1; MG/1
1 TABLET ORAL EVERY 6 HOURS PRN
Status: DISCONTINUED | OUTPATIENT
Start: 2022-04-06 | End: 2022-04-09 | Stop reason: HOSPADM

## 2022-04-06 RX ORDER — POLYETHYLENE GLYCOL 3350 17 G/17G
17 POWDER, FOR SOLUTION ORAL 2 TIMES DAILY
Status: DISCONTINUED | OUTPATIENT
Start: 2022-04-06 | End: 2022-04-09 | Stop reason: HOSPADM

## 2022-04-06 RX ORDER — POLYETHYLENE GLYCOL 3350 17 G/17G
17 POWDER, FOR SOLUTION ORAL 2 TIMES DAILY
Status: DISCONTINUED | OUTPATIENT
Start: 2022-04-06 | End: 2022-04-06

## 2022-04-06 RX ORDER — OXYCODONE AND ACETAMINOPHEN 7.5; 325 MG/1; MG/1
1 TABLET ORAL ONCE
Status: COMPLETED | OUTPATIENT
Start: 2022-04-06 | End: 2022-04-06

## 2022-04-06 RX ADMIN — SILVER SULFADIAZINE: 10 CREAM TOPICAL at 09:28

## 2022-04-06 RX ADMIN — INSULIN LISPRO 10 UNITS: 100 INJECTION, SOLUTION INTRAVENOUS; SUBCUTANEOUS at 12:40

## 2022-04-06 RX ADMIN — ATORVASTATIN CALCIUM 80 MG: 80 TABLET, FILM COATED ORAL at 20:49

## 2022-04-06 RX ADMIN — TAMSULOSIN HYDROCHLORIDE 0.4 MG: 0.4 CAPSULE ORAL at 06:55

## 2022-04-06 RX ADMIN — MICONAZOLE NITRATE: 20 CREAM TOPICAL at 09:28

## 2022-04-06 RX ADMIN — Medication 10 ML: at 20:57

## 2022-04-06 RX ADMIN — POLYETHYLENE GLYCOL 3350 17 G: 17 POWDER, FOR SOLUTION ORAL at 14:26

## 2022-04-06 RX ADMIN — ENOXAPARIN SODIUM 30 MG: 100 INJECTION SUBCUTANEOUS at 07:56

## 2022-04-06 RX ADMIN — INSULIN LISPRO 10 UNITS: 100 INJECTION, SOLUTION INTRAVENOUS; SUBCUTANEOUS at 08:38

## 2022-04-06 RX ADMIN — OXYCODONE AND ACETAMINOPHEN 1 TABLET: 7.5; 325 TABLET ORAL at 03:49

## 2022-04-06 RX ADMIN — POLYETHYLENE GLYCOL 3350 17 G: 17 POWDER, FOR SOLUTION ORAL at 20:50

## 2022-04-06 RX ADMIN — INSULIN LISPRO 2 UNITS: 100 INJECTION, SOLUTION INTRAVENOUS; SUBCUTANEOUS at 20:50

## 2022-04-06 RX ADMIN — FUROSEMIDE 5 MG/HR: 10 INJECTION, SOLUTION INTRAMUSCULAR; INTRAVENOUS at 09:27

## 2022-04-06 RX ADMIN — OXYCODONE AND ACETAMINOPHEN 1 TABLET: 5; 325 TABLET ORAL at 22:43

## 2022-04-06 RX ADMIN — FUROSEMIDE 5 MG/HR: 10 INJECTION, SOLUTION INTRAMUSCULAR; INTRAVENOUS at 22:53

## 2022-04-06 RX ADMIN — INSULIN GLARGINE 30 UNITS: 100 INJECTION, SOLUTION SUBCUTANEOUS at 08:38

## 2022-04-06 RX ADMIN — OXYCODONE AND ACETAMINOPHEN 1 TABLET: 325; 5 TABLET ORAL at 08:01

## 2022-04-06 RX ADMIN — MICONAZOLE NITRATE: 20 CREAM TOPICAL at 20:57

## 2022-04-06 RX ADMIN — PREGABALIN 25 MG: 25 CAPSULE ORAL at 07:54

## 2022-04-06 RX ADMIN — ENOXAPARIN SODIUM 30 MG: 100 INJECTION SUBCUTANEOUS at 20:49

## 2022-04-06 RX ADMIN — PREGABALIN 25 MG: 25 CAPSULE ORAL at 20:49

## 2022-04-06 RX ADMIN — INSULIN LISPRO 10 UNITS: 100 INJECTION, SOLUTION INTRAVENOUS; SUBCUTANEOUS at 18:07

## 2022-04-06 RX ADMIN — ASPIRIN 81 MG 81 MG: 81 TABLET ORAL at 07:55

## 2022-04-06 ASSESSMENT — PAIN DESCRIPTION - LOCATION
LOCATION: GENERALIZED
LOCATION: GENERALIZED;LEG

## 2022-04-06 ASSESSMENT — PAIN DESCRIPTION - PAIN TYPE
TYPE: CHRONIC PAIN
TYPE: CHRONIC PAIN

## 2022-04-06 ASSESSMENT — PAIN - FUNCTIONAL ASSESSMENT: PAIN_FUNCTIONAL_ASSESSMENT: ACTIVITIES ARE NOT PREVENTED

## 2022-04-06 ASSESSMENT — PAIN DESCRIPTION - ONSET: ONSET: ON-GOING

## 2022-04-06 ASSESSMENT — PAIN SCALES - GENERAL
PAINLEVEL_OUTOF10: 8
PAINLEVEL_OUTOF10: 0
PAINLEVEL_OUTOF10: 9
PAINLEVEL_OUTOF10: 9
PAINLEVEL_OUTOF10: 7
PAINLEVEL_OUTOF10: 9
PAINLEVEL_OUTOF10: 9
PAINLEVEL_OUTOF10: 7

## 2022-04-06 ASSESSMENT — PAIN DESCRIPTION - FREQUENCY: FREQUENCY: CONTINUOUS

## 2022-04-06 ASSESSMENT — PAIN DESCRIPTION - ORIENTATION: ORIENTATION: LEFT

## 2022-04-06 ASSESSMENT — PAIN DESCRIPTION - DESCRIPTORS: DESCRIPTORS: DISCOMFORT

## 2022-04-06 NOTE — PLAN OF CARE
Problem: Falls - Risk of:  Goal: Will remain free from falls  Description: Will remain free from falls  4/6/2022 0933 by Franki Cordova RN  Outcome: Ongoing  Note: Pt will remain free from falls throughout hospital stay. Fall precautions in place, bed in lowest position with wheels locked and side rails 2/4 up. Room door open and hourly rounding completed. Patient is currently refusing bed alarm. Will continue to monitor throughout shift. Problem: Skin Integrity:  Goal: Will show no infection signs and symptoms  Description: Will show no infection signs and symptoms  Outcome: Ongoing  Note: Pt is at risk for skin breakdown. Pt will have skin assessments every shift, encourage safe ambulation, heels elevated off of the bed, and friction and shear prevented when possible. Wound care orders completed over nightshift. Will continue to monitor for signs of skin breakdown and enforce prevention measures. Problem: Pain:  Goal: Pain level will decrease  Description: Pain level will decrease  Outcome: Ongoing  Note: Pt will be satisfied with pain control with scheduled percocet. Pt uses numeric pain rating scale with reassessments after pain med administration. Will continue to monitor progression throughout shift.

## 2022-04-06 NOTE — PLAN OF CARE
Problem: Falls - Risk of:  Goal: Will remain free from falls  Description: Will remain free from falls  4/5/2022 2314 by Jenn Chávez RN  Outcome: Ongoing  Note: Pt at risk for falls. Educated on fall precautions. Pt alert and oriented, calls out appropriately. Non-skid socks on. Pt remains free from falls, will continue to monitor. Problem: FLUID AND ELECTROLYTE IMBALANCE  Goal: Fluid and electrolyte balance are achieved/maintained  Outcome: Ongoing  Note:   Patient's EF (Ejection Fraction) is greater than 40%    Heart Failure Medications:  Diuretics[de-identified] Furosemide    (One of the following REQUIRED for EF </= 40%/SYSTOLIC FAILURE but MAY be used in EF% >40%/DIASTOLIC FAILURE)        ACE[de-identified] None        ARB[de-identified] None         ARNI[de-identified] None    (Beta Blockers)  NON- Evidenced Based Beta Blocker (for EF% >40%/DIASTOLIC FAILURE): None    Evidenced Based Beta Blocker::(REQUIRED for EF% <40%/SYSTOLIC FAILURE) Carvedilol- Coreg  . .................................................................................................................................................. Patient's weights and intake/output reviewed: Yes    Patient's Last Weight: 289 lbs obtained by standing scale. Difference of 2 lbs less than last documented weight.       Intake/Output Summary (Last 24 hours) at 4/5/2022 2317  Last data filed at 4/5/2022 2313  Gross per 24 hour   Intake 1080 ml   Output 1820 ml   Net -740 ml       Comorbidities Reviewed Yes    Patient has a past medical history of Anemia, Angina, Arthritis, CAD (coronary artery disease), CHF (congestive heart failure) ([facility] [address] CKD (chronic kidney disease) stage 3, GFR 30-59 ml/min ([facility]), Clostridium difficile diarrhea, Diabetes mellitus ([address] Diabetic neuropathy ([facility] [address] Disease of blood and blood forming organ, Dizziness, GERD (gastroesophageal reflux disease), Headache, Hearing loss, Hyperlipidemia, Hypertension, Kidney stone, 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 >>For CHF and Comorbidity documentation on Education Time and Topics, please see Education Tab    Progressive Mobility Assessment:  What is this patient's Current Level of Mobility?: Ambulatory- with Assistance  How was this patient Mobilized today?: Edge of Bed, Up to Chair, Bedside Commode,  Up to Toilet/Shower, and Up in Room, ambulated 10 ft                 With Whom? Nurse                 Level of Difficulty/Assistance: 1x Assist     Pt resting in bed at this time on  4 L O2. Pt with complaints of shortness of breath. Pt with pitting lower extremity edema. Patient and/or Family's stated Goal of Care this Admission: reduce shortness of breath, increase activity tolerance, better understand heart failure and disease management, be more comfortable, and reduce lower extremity edema prior to discharge        :       Problem: Serum Glucose Level - Abnormal:  Goal: Ability to maintain appropriate glucose levels will improve  Description: Ability to maintain appropriate glucose levels will improve  Outcome: Ongoing  Note: Pt at risk for elevated blood glucose levels r/t DM. Pt will have glucose levels monitored prior to breakfast, lunch, dinner, and bedtime. Elevated levels will be treated via SSI. Pt understands the need to monitor levels and has no further complaints at this time. Problem: Skin Integrity:  Goal: Absence of new skin breakdown  Description: Absence of new skin breakdown  Outcome: Ongoing  Note: Pt's skin will be assessed each shift to prevent further skin breakdown. Wound care will be administered on LLE to prevent further breakdown d/t venous ulcer.

## 2022-04-06 NOTE — FLOWSHEET NOTE
Cross cover paged:  Pt c/o 9/10 BLE pain d/t venous ulcers. He's received a 1x dose of Percocet overnight for the past 2 nights and is requesting another one tonight. Can this be ordered?  Thanks

## 2022-04-06 NOTE — PROGRESS NOTES
Physical Therapy  Facility/Department: [facility] A2 CARD TELEMETRY  Daily Treatment Note  NAME: Xavier Blair  : 1955  MRN: [id number]    Date of Service: 2022    Discharge Recommendations:  Subacute/Skilled Nursing Facility   PT Equipment Recommendations  Equipment Needed: No    Assessment   Body structures, Functions, Activity limitations: Decreased functional mobility ; Decreased balance;Decreased posture;Decreased strength;Decreased endurance;Decreased coordination  Assessment: Pt seen in am for PT tx, pt pleasant and agreeable. Pt dmonstrates good progress with mobility requiring SBA for t/f and ambulation up to 120' with RW. Pt is limited by decreased activity tolerance with seated rest to recover with gait activities. Pt will benefit from continued skilled PT in acute care setting to address above deficits. Treatment Diagnosis: impaired functional mobility  Specific instructions for Next Treatment: progress mobility as tolerated  Prognosis: Good  Decision Making: Low Complexity  PT Education: Goals; General Safety;Gait Training;PT Role;Disease Specific Education;Plan of Care; Functional Mobility Training;Transfer Training  Patient Education: Pt educated on role of PT, importance of OOB mobility, safe mobility with AD -- pt verbalized understanding  Barriers to Learning: none  REQUIRES PT FOLLOW UP: Yes  Activity Tolerance  Activity Tolerance: Patient Tolerated treatment well;Patient limited by fatigue;Patient limited by endurance  Activity Tolerance: Pt reports SOB with gait activities with seated rest break to recover     Patient Diagnosis(es): The primary encounter diagnosis was Acute on chronic congestive heart failure, unspecified heart failure type ([facility] [place] [address] Diagnoses of Acute respiratory failure with hypoxia (HCC) and Acute on chronic renal insufficiency were also pertinent to this visit.      has a past medical history of Anemia, Angina, Arthritis, CAD (coronary artery disease), CHF (congestive heart failure) ([facility] [address] CKD (chronic kidney disease) stage 3, GFR 30-59 ml/min ([facility]), Clostridium difficile diarrhea, Diabetes mellitus ([facility] [address] Diabetic neuropathy ([facility] Disease of blood and blood forming organ, Dizziness, GERD (gastroesophageal reflux disease), Headache, Hearing loss, Hyperlipidemia, Hypertension, Kidney stone, Refusal of blood transfusions as patient is [employer], Sleep apnea, Tinnitus, Venous ulcer ([facility] [address] and Wound, open. has a past surgical history that includes hernia repair (age2); Cholecystectomy (9/2011); other surgical history (11-16-11 REPAIR LOWER STERNAL INCISION, REMOVAL OF ONE STERNAL WIRE,); Upper gastrointestinal endoscopy; Cardiac surgery; other surgical history (10/10/2014); and Pain management procedure (N/A, 2/10/2022). Restrictions  Restrictions/Precautions  Restrictions/Precautions: Up as Tolerated,General Precautions  Position Activity Restriction  Other position/activity restrictions: up as tolerated, O2, wounds BLE  Subjective   General  Chart Reviewed: Yes  Response To Previous Treatment: Patient with no complaints from previous session.   Family / Caregiver Present: No  Referring Practitioner: Dr. Blair Leija MD  Subjective  Subjective: Pt seated in recliner on approach, pleasant and agreeable to PT tx  General Comment  Comments: RN cleared pt for session  Pain Screening  Patient Currently in Pain: Denies  Vital Signs  BP: (!) 98/51  BP Location: Left upper arm  Patient Position: Sitting  Patient Currently in Pain: Denies  Oxygen Therapy  SpO2: 93 %  O2 Device: Nasal cannula  O2 Flow Rate (L/min): 4 L/min       Orientation  Orientation  Overall Orientation Status: Within Functional Limits    Objective   Bed mobility  Supine to Sit: Unable to assess  Sit to Supine: Unable to assess  Comment: Pt seated in recliner at start and end of session     Transfers  Sit to Stand: Stand by assistance  Stand to sit: Stand by assistance  Comment: Recliner to RW, couch to RW, min cues for hand placement     Ambulation  Ambulation?: Yes  Ambulation 1  Surface: level tile  Device: Rolling Walker  Other Apparatus: O2 (4L O2 NC)  Assistance: Stand by assistance  Quality of Gait: Slow reciprocal pattern, B decreased toe clearance and heel strike, forward flexed trunk. Steady without overt LOB  Gait Deviations: Shuffles; Slow Chrissy;Decreased step length;Decreased step height; Increased CHAPARRITA  Distance: [de-identified]' + 120'        Balance  Posture: Fair  Sitting - Static: Good  Sitting - Dynamic: Good  Standing - Static: Fair;+  Standing - Dynamic: Fair  Comments: SBA with RW                             AM-PAC Score     AM-PAC Inpatient Mobility without Stair Climbing Raw Score : 15 (04/06/22 1204)  AM-PAC Inpatient without Stair Climbing T-Scale Score : 43.03 (04/06/22 1204)  Mobility Inpatient CMS 0-100% Score: 47.43 (04/06/22 1204)  Mobility Inpatient without Stair CMS G-Code Modifier : CK (04/06/22 1204)       Goals  Short term goals  Time Frame for Short term goals: 1 week (4/12) unless otherwise specified  Short term goal 1: Pt will be supervision with supine<>sit. -- 4/06: DNT  Short term goal 2: Pt will be supervision with transfers with LRAD. -- 4/06: SBA with RW  Short term goal 3: Pt will ambulate 150 ft with SBA and LRAD. -- 4/06: 120' with RW SBA  Short term goal 4: 4/09: Pt will partcipate in 12-15 reps of BLE exercises to promote strength and activity tolerance.  -- 4/06: DNT  Patient Goals   Patient goals : \"to walk and get stronger\"    Plan    Plan  Times per week: 3-5x/wk  Times per day: Daily  Specific instructions for Next Treatment: progress mobility as tolerated  Current Treatment Recommendations: Strengthening,Neuromuscular Re-education,Home Exercise Program,Balance Training,Endurance Training,Safety Education & Training,Functional Mobility Training,Transfer Training,Gait Training,Equipment Evaluation, Education, & procurement,Patient/Caregiver Education & Training  Safety Devices  Type of devices:  All fall risk precautions in place,Call light within reach,Gait belt,Nurse notified,Left in chair,Chair alarm in place,Patient at risk for falls     Therapy Time   Individual Concurrent Group Co-treatment   Time In 1113         Time Out 1141         Minutes 28         Timed Code Treatment Minutes: [address], PT, DPT

## 2022-04-06 NOTE — PLAN OF CARE
Problem: OXYGENATION/RESPIRATORY FUNCTION  Goal: Patient will maintain patent airway  Outcome: Ongoing  Note:   Patient's EF (Ejection Fraction) is greater than 40%    Heart Failure Medications:  Diuretics[de-identified] Furosemide    (One of the following REQUIRED for EF </= 40%/SYSTOLIC FAILURE but MAY be used in EF% >40%/DIASTOLIC FAILURE)        ACE[de-identified] None        ARB[de-identified] None         ARNI[de-identified] None    (Beta Blockers)  NON- Evidenced Based Beta Blocker (for EF% >40%/DIASTOLIC FAILURE): None    Evidenced Based Beta Blocker::(REQUIRED for EF% <40%/SYSTOLIC FAILURE) Carvedilol- Coreg  . .................................................................................................................................................. Patient's weights and intake/output reviewed: Yes    Patient's Last Weight: 289 lbs obtained by standing scale. Difference of 0 lbs     than last documented weight. Intake/Output Summary (Last 24 hours) at 4/6/2022 0975  Last data filed at 4/6/2022 0529  Gross per 24 hour   Intake 960 ml   Output 1620 ml   Net -660 ml       Comorbidities Reviewed Yes    Patient has a past medical history of Anemia, Angina, Arthritis, CAD (coronary artery disease), [facility]) [facility] CKD (chronic kidney disease) stage 3, GFR 30-59 ml/min ([facility]), Clostridium difficile diarrhea, Diabetes mellitus ([facility] Diabetic neuropathy ([facility] Disease of blood and blood forming organ, Dizziness, GERD (gastroesophageal reflux disease), Headache, Hearing loss, Hyperlipidemia, Hypertension, Kidney stone, 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      >>For CHF and Comorbidity documentation on Education Time and Topics, please see Education Tab    Progressive Mobility Assessment:  What is this patient's Current Level of Mobility?: Ambulatory-Up Ad Mami  How was this patient Mobilized today?: Edge of Bed,  Up to Toilet/Shower, and Up in Room, ambulated 20 ft                 With Whom? Nurse                 Level of Difficulty/Assistance: 1x Assist     Pt sitting in bed at this time on  3 L O2. Pt denies shortness of breath. Pt with pitting lower extremity edema.      Patient and/or Family's stated Goal of Care this Admission: reduce shortness of breath, increase activity tolerance, better understand heart failure and disease management, be more comfortable, and reduce lower extremity edema prior to discharge        :

## 2022-04-06 NOTE — PROGRESS NOTES
Occupational Therapy  Facility/Department: [facility] A2 CARD TELEMETRY  Daily Treatment Note  NAME: Johanna Benavidez  : 1955  MRN: [id number]    Date of Service: 2022    Discharge Recommendations:  Subacute/Skilled Nursing Facility       Assessment   Performance deficits / Impairments: Decreased functional mobility ; Decreased safe awareness;Decreased balance;Decreased ADL status; Decreased endurance;Decreased high-level IADLs  Assessment: Pt tolerated OT session well. Pt with improved standing tolerance and balance compared to session yesterday. SpO2 ranges 89%-94% on 4L O2. At baseline pt lives alone, Mod I with transfers and mobilty with cane prn. Pt reports functional decline over the last several months with increasing difficulty to complete I/ADLs. Pt current with home health care and is planning to move to [facility] in the future. Pt currently requiring SBA with simple transfers and ambulation using RW. Pt currently requiring Mod-Min A with LB ADLs and Min-SBA with UB ADLs. Pt is below baseline level of occupational performance. Recommend continued OT services to increase safety and independence with ADLs and functional transfers. Anticipate pt will require SNF for continued OT services upon discharge. Prognosis: Good    OT Education: OT Role;Transfer Training;Equipment;Plan of Care;ADL Adaptive Strategies; Low Vision Education  Patient Education: home safety concerns, OT discharge recommendations, standing balance, recommend use of RW for ambulation, toilet transfers, safety with toileting/dressing  Barriers to Learning: pt demonstrates and verbalizes understanding.   REQUIRES OT FOLLOW UP: Yes    Activity Tolerance  Activity Tolerance: Patient Tolerated treatment well  Activity Tolerance: Vitals seated: 143/63, 77bpm, SpO2 ranges 89%-94% on 4L O2  Safety Devices  Safety Devices in place: Yes  Type of devices: Left in chair;Call light within reach;Gait belt;Nurse notified         Patient Diagnosis(es): The primary encounter diagnosis was Acute on chronic congestive heart failure, unspecified heart failure type ([facility]). Diagnoses of Acute respiratory failure with hypoxia ([facility]) and Acute on chronic renal insufficiency were also pertinent to this visit. has a past medical history of Anemia, Angina, Arthritis, CAD (coronary artery disease), CHF (congestive heart failure) ([facility]), CKD (chronic kidney disease) stage 3, GFR 30-59 ml/min ([facility]), Clostridium difficile diarrhea, Diabetes mellitus ([facility]), Diabetic neuropathy ([facility]), Disease of blood and blood forming organ, Dizziness, GERD (gastroesophageal reflux disease), Headache, Hearing loss, Hyperlipidemia, Hypertension, Kidney stone, Refusal of blood transfusions as patient is [employer], Sleep apnea, Tinnitus, Venous ulcer ([facility]), and Wound, open. has a past surgical history that includes hernia repair (age2); Cholecystectomy (9/2011); other surgical history (11-16-11 REPAIR LOWER STERNAL INCISION, REMOVAL OF ONE STERNAL WIRE,); Upper gastrointestinal endoscopy; Cardiac surgery; other surgical history (10/10/2014); and Pain management procedure (N/A, 2/10/2022). Restrictions  Restrictions/Precautions  Restrictions/Precautions: Up as Tolerated,General Precautions  Position Activity Restriction  Other position/activity restrictions: up as tolerated, O2, wounds BLE     Subjective   General  Chart Reviewed: Yes  Patient assessed for rehabilitation services?: Yes  Additional Pertinent Hx: arthritis, CAD, CHf, DM, neuropathy, anemia, CKd, hearing loss, CABG x3, hernia repair, GARY, venous ulcer, obesity. Family / Caregiver Present: No    Diagnosis: Pt presents with SOB. Pt admitted with acute on chronic diastolic heart failure, moderate aortic stenosis, elevated troponin, MARGARITO, acute hypoxic respiratory failure. Subjective  Subjective: pt seated EOB upon OT arrival. Pt reports that he is feeling better than yesterday.   General Comment  Comments: RN agreeable to OT session. Pain Assessment  Pain Assessment: 0-10  Pain Level: 7  Patient's Stated Pain Goal: 3  Pain Type: Chronic pain  Pain Location: Generalized  Pain Descriptors: Discomfort  Pain Frequency: Continuous  Pain Onset: On-going  Functional Pain Assessment: Activities are not prevented  Non-Pharmaceutical Pain Intervention(s): Ambulation/Increased Activity; Distraction;Repositioned  Response to Pain Intervention: Patient Satisfied  Multiple Pain Sites: No  Pre Treatment Pain Screening  Intervention List: Patient able to continue with treatment;Nurse/Physician notified  Vital Signs  Patient Currently in Pain: Yes     Objective    ADL  Grooming: Stand by assistance (standing at sink to brush teeth and wash hands. Pt requiring cues for low vision strategies)  LE Dressing: Moderate assistance (doff/don shorts and socks)  Toileting: Minimal assistance (partial assist for thoroughness of pericare)           Balance  Sitting Balance: Independent  Standing Balance: Stand by assistance (RW)    Standing Balance  Time: 3-4 minutes x2  Activity: bathroom mobility, prn toileting, grooming at sink, ambulation 12 feet x2 using SPC, ambulation 150 feet with RW  Comment: SBA using RW    Functional Mobility  Functional - Mobility Device: Rolling Walker  Activity: To/from bathroom; Other  Functional Mobility Comments: SBA using  feet with 4L O2     Transfers  Stand Step Transfers: Stand by assistance  Sit to stand: Stand by assistance  Stand to sit: Stand by assistance  Transfer Comments: to/from EOB and chair using RW with cues for hand placement        Cognition  Overall Cognitive Status: [address]  Times per week: 3-5x/week  Current Treatment Recommendations: Strengthening,Endurance Training,Patient/Caregiver Education & Training,Equipment Evaluation, Education, & procurement,Self-Care / [place] [employer] Jennifer Wallace Mobility Training,Safety Education & Training,Positioning    AM-PAC Score        AM-PAC Inpatient Daily Activity Raw Score: 17 (04/06/22 1101)  AM-PAC Inpatient ADL T-Scale Score : 37.26 (04/06/22 1101)  ADL Inpatient CMS 0-100% Score: 50.11 (04/06/22 1101)  ADL Inpatient CMS G-Code Modifier : CK (04/06/22 1101)    Goals  Short term goals  Time Frame for Short term goals: 2 weeks (4/19/22)  Short term goal 1: Mod I functional tranfers to/from EOB, chair, toilet; ongoing, SBA 4/6  Short term goal 2: SBA LB dressing with AE as needed; ongoing, Mod A 4/6  Short term goal 3: Mod I toileting; ongoing, SBA 4/6  Short term goal 4: Mod I grooming in stance at sink; ongoing, SBA 4/6       Therapy Time   Individual Concurrent Group Co-treatment   Time In 1001         Time Out 1050         Minutes 49         Timed Code Treatment Minutes: 52 Minutes     If pt is discharged prior to next OT session, this note will serve as the discharge summary.     Erich Corey, OT

## 2022-04-06 NOTE — PROGRESS NOTES
Hospitalist Progress Note  4/6/2022 11:31 AM  Subjective:   Admit Date: 3/31/2022  PCP: Xin Menendez CNP Status:  Inpatient  Interval History: Hospital Day: 7, admitted with acute hypoxic respiratory failure (10 LPM oxygen on baseline 4 LPM) secondary to diastolic heart failure and refractory to increased oral diuretic prior to admission on furosemide gtt exacerbated by acute kidney injury on CKD stage 3a and type 2 diabetes with episodic hypoglycemia on insulin. Epistaxis from nasal cannula oxygen, left nostril, similar to previous episode requiring cauterization with Silver nitrate. Subjective: continues to feel well. No CP. Dyspnea on exertion and again increased to 4L NC. Feels that his uop is less. Legs have not changed much in last 24 hours. Feels constipated. Diet: controlled carbohydrate (75 gm/meal)  Medications:   Furosemide at 5 mg/hr  linaclotide  290 mcg Oral QAM AC   insulin lispro  10 Units SubCUTAneous TID WC   tamsulosin  0.4 mg Oral Daily   aspirin  81 mg Oral Daily   atorvastatin  80 mg Oral Nightly   carvedilol  6.25 mg Oral BID WC   ferrous sulfate  325 mg Oral TID WC   insulin glargine  80 Units SubCUTAneous BID   isosorbide mononitrate  30 mg Oral Daily   oxycodone acetaminophen  1 tablet Oral 4x Daily   pregabalin  25 mg Oral BID   enoxaparin  30 mg SubCUTAneous BID   insulin lispro SSI   10 + 0-18 Units SubCUTAneous TID WC     No results for input(s): WBC, HGB, PLT, MCV in the last 72 hours. Recent Labs     04/04/22  0720 04/05/22  0651 04/06/22  0710    138 137   K 3.6 3.9 3.9   CL 90* 92* 95*   CO2 38* 36* 34*   BUN 65* 67* 64*   CREATININE 1.4* 1.8* 1.6*   GLUCOSE 201* 132* 134*     No results for input(s): AST, ALT, ALB, BILITOT, ALKPHOS in the last 72 hours. Troponin T:   No results for input(s): TROPONINI in the last 72 hours.   INR (3/31) 1.29  proBNP (3/21) 2,358 pg/mL  Magnesium (4/2) 3.00 mg/dL  Fe / TIBC (4/1) 42 / 268 = 16% iron saturation      POC Glucose:   Recent Labs     04/05/22  1708 04/05/22  1934 04/06/22  0326 04/06/22  0750   POCGLU 140* 180* 72 127*     TTE (1/13/22)  Limited only f/u for LVEF and aortic valve stenosis. Technically difficult examination.  Low normal left ventricular systolic function with ejection fraction of 50%. Moderate aortic stenosis. Aortic stenosis values appear approximately same when compared to echo on 8-. Mild to moderate aortic regurgitation. Objective:   Vitals:  BP (!) 103/40   Pulse 58   Temp 98.5 °F (36.9 °C) (Oral)   Resp 21   Ht 5' 9\" (1.753 m)   Wt 289 lb 3.2 oz (131.2 kg)   SpO2 94%   BMI 42.71 kg/m²   General appearance: alert and cooperative with exam  Lungs: 4L NC. Bilateral crackles on exam  Heart: rrr 2/6 systolic murmur noted worst on inspiration   Abdomen: soft, non-tender; bowel sounds normal; no masses,  no organomegaly  Extremities: 2+pitting edema bilaterally with chronic venous insufficiency bilaterally, Left leg with weeping wound  Neurologic: No obvious focal neurologic deficits. Assessment and Plan:     Acute hypoxic respiratory failure initially on 10 LPM and now down to baseline 4 LPM with diuresis. Improving but weight has plateud in last 24 hours. Asked nursing to wean oxygen to >90% with least amount of oxygen. May need to add metolazone or diuril to lasix gtt. Diastolic heart failure on furosemide gtt. Continue carvedilol 6.25 mg BID, maintain SBP > 100. ACE/ARB contraindicated by MARGARITO/CKD. Acute kidney injury on CKD stage 3a. Improving. (baseline 1.5-1.7). Followed by nephrology. CAD on aspirin, statin, and beta blocker. Prior CABG 2010, [facility] in 2012 with patent grafts, mixed ischemia and scar on stress test March 2022 - medical management. Elevated troponin likely supply / demand mismatch. No ischemic evaluation at this time per cardiology. Iron deficiency anemia in setting of heart failure:  Venofer 200 mg IV daily x 3 days.   He received 3 doses of IV Venofer in 2021. He has received IV iron infusions in the past. NO blood products. Type 2 Diabetes (controlled, HgbA1c 7.0%) with episodes of hypoglycemia. Cover with a \"sliding scale\" lispro moderate scale prandial correction insulin. Complicated by episodes of hypoglycemia. Adjusted basal insulin  to wt based dosing. Much improved. Dyslipidemia (LDL 65) on atorvastatin 80 mg nightly. Opioid induced constipation on linaclotide. HENNA baseline. Class III obesity (BMI 90/3) complicates medical management. Epistaxis- likely related to dry air from continuous O2. Resolving. Trial afrin x 3 days    Noted temp of 100.2 in chart but pt without focal or systemic signs of infection. Monitor clinically    Advance Directive: Full Code  DVT prophylaxis with enoxaparin 30 mg sub-Q BID. Discharge plannin-2 more days inpatient status.   Will need to determine home diuretic regimen      Grace Mitchell MD  [address] [place] [address]

## 2022-04-06 NOTE — PROGRESS NOTES
[facility] Progress Note           Subjective/   75y.o. year old male who we are seeing in consultation for MARGARITO/CKD-3. HPI:  The patient was seen and examined; he feels well today with no CP, SOB, nausea or vomiting. ROS: No fever or chills. Social: No family at bedside. Objective/   GEN:  Chronically ill, BP (!) 98/51   Pulse 66   Temp 98.5 °F (36.9 °C) (Oral)   Resp 20   Ht 5' 9\" (1.753 m)   Wt 289 lb 3.2 oz (131.2 kg)   SpO2 93%   BMI 42.71 kg/m²      HEENT: non-icteric, no JVD  CV: S1, S2 without m/r/g; ++ LE edema  RESP: CTA B without w/r/r; breathing wnl  ABD: +bs, soft, nt, no hsm  SKIN: warm, no rashes    Data/  No results for input(s): WBC, HGB, HCT, MCV, PLT in the last 72 hours.   Recent Labs     04/04/22  0720 04/05/22  0651 04/06/22  0710    138 137   K 3.6 3.9 3.9   CL 90* 92* 95*   CO2 38* 36* 34*   GLUCOSE 201* 132* 134*   MG 2.70* 2.60*  --    BUN 65* 67* 64*   CREATININE 1.4* 1.8* 1.6*   LABGLOM 50* 38* 43*   GFRAA >60 46* 52*       Assessment/     - Acute on chronic kidney disease stage 3              Worsening in setting of CHF decompensation, hx of volatile fluctuations in levels   based on volume status & diuretic use              Background DM Nephropathy + Cardiorenal syndrome              Baseline SCr mid 1 range, 1.5-1.7              Follows with Dr. Collette Stade in office   Improving with diuresis                - dCHF exacerbation     - DM2 - poorly controlled     - Normocytic anemia, chronic - Hb at target     - Restoration    Plan/     - Continue Lasix drip at 5 mg/hour  - Trend labs, weights, bp's, & urine output

## 2022-04-06 NOTE — PROGRESS NOTES
[facility]   Daily Cardiovascular Progress Note    Admit Date: 3/31/2022    CC:Shortness of Breath (medics report shortness of breath, pt on home oxygen 4 liters, pt placed on non rebreather. pt wheezing. pt given douneb. 10 liters  bs 268) and Leg Problem (pt seeing wound care for left leg wheeping. pt reports this was tuesday. )      HPI: Melva Aparicio has no new complaints today.       Medications/Labs all Reviewed:  Patient Active Problem List   Diagnosis    DM (diabetes mellitus) ([address]    Coronary artery disease involving native coronary artery of native heart without angina pectoris    Anemia of chronic disease    Essential hypertension    Hyperkalemia    Chronic diastolic heart failure ([address]    Pulmonary hypertension due to left ventricular diastolic dysfunction ([address]    Morbid obesity ([address]    S/P CABG x 3    DM2 (diabetes mellitus, type 2) ([address]    Morbid obesity with BMI of 50.0-59.9, adult ([address]    HLD (hyperlipidemia)    Cardiomyopathy ([address]    Productive cough    Chronic pain    Severe obstructive sleep apnea    Obesity, Class III, BMI 40-49.9 (morbid obesity) (HCC)    Acute diastolic congestive heart failure (HCC)    Degeneration of lumbar or lumbosacral intervertebral disc    Displacement of lumbar intervertebral disc without myelopathy    Lumbosacral spondylosis without myelopathy    Lumbar facet arthropathy    Disc displacement, lumbar    Spinal stenosis of lumbar region    Mixed conductive and sensorineural hearing loss of left ear with restricted hearing of right ear    Tympanic membrane perforation, left    Chest pain    Cor pulmonale, chronic (HCC)    Pulmonary hypertension due to alveolar hypoventilation disorder (HCC)    Nonrheumatic aortic valve stenosis    Chronic neck pain    Dyspnea    Acute diastolic CHF (congestive heart failure) (HCC)    Cervical stenosis of spinal canal    Degeneration of cervical intervertebral disc    Cervical radiculitis    Acute on chronic diastolic CHF (congestive heart failure) (HCC)    Acute respiratory failure with hypoxia (HCC)    Acute on chronic renal insufficiency       Medications:   isosorbide mononitrate  15 mg Oral Daily    insulin glargine  30 Units SubCUTAneous QAM    carvedilol  3.125 mg Oral BID WC    linaclotide  290 mcg Oral QAM AC    insulin lispro  10 Units SubCUTAneous TID WC    tamsulosin  0.4 mg Oral Daily    miconazole   Topical BID    aspirin  81 mg Oral Daily    atorvastatin  80 mg Oral Nightly    oxyCODONE-acetaminophen  1 tablet Oral 4x Daily    silver sulfADIAZINE   Topical Daily    pregabalin  25 mg Oral BID    sodium chloride flush  5-40 mL IntraVENous 2 times per day    enoxaparin  30 mg SubCUTAneous BID    insulin lispro  0-18 Units SubCUTAneous TID WC    insulin lispro  0-9 Units SubCUTAneous Nightly       Infusion Medications:   sodium chloride      dextrose      furosemide (LASIX) 1mg/ml infusion 5 mg/hr (04/06/22 0927)       Labs:  Lab Results   Component Value Date    WBC 5.4 04/03/2022    HGB 10.9 (L) 04/03/2022    HCT 33.9 (L) 04/03/2022    MCV 88.2 04/03/2022     04/03/2022     Lab Results   Component Value Date    CREATININE 1.6 (H) 04/06/2022    BUN 64 (H) 04/06/2022     04/06/2022    K 3.9 04/06/2022    CL 95 (L) 04/06/2022    CO2 34 (H) 04/06/2022     Lab Results   Component Value Date    INR 1.29 (H) 03/31/2022    PROTIME 14.7 (H) 03/31/2022        Physical Examination:    BP (!) 103/40   Pulse 58   Temp 98.5 °F (36.9 °C) (Oral)   Resp 21   Ht 5' 9\" (1.753 m)   Wt 289 lb 3.2 oz (131.2 kg)   SpO2 94%   BMI 42.71 kg/m²    Wt Readings from Last 3 Encounters:   04/06/22 289 lb 3.2 oz (131.2 kg)   03/14/22 282 lb 9.6 oz (128.2 kg)   02/10/22 280 lb (127 kg)       Intake/Output Summary (Last 24 hours) at 4/6/2022 1047  Last data filed at 4/6/2022 0928  Gross per 24 hour   Intake 960 ml   Output 1620 ml   Net -660 ml Respiratory:  · Resp Assessment: Reduced respiratory effort   · Resp Auscultation: mild rales to auscultation bilaterally   Cardiovascular:  · Auscultation: regular rhythm and normal rate, normal S1S2, no murmur, rub or gallop  · Palpation:  Nl PMI  · JVP:  elevated  · Extremities:+ Edema  Abdomen:  · Soft, non-tender  · Normal bowel sounds  Extremities:  ·  No Cyanosis or Clubbing  Neurological/Psychiatric:  · Oriented to time, place, and person  · Fatigued appearing  Skin Warm and dry    Assessment:      Acute on chronic diastolic CHF (congestive heart failure) (HCC)    Acute on chronic renal insufficiency    Plan:  1. Continue diuresis for net negative fluid balance daily   ~lasix being managed by nephrology  2. Salt and fluid restriction as necessary  3. Advanced heart failure team to manage as OP      All questions and concerns were addressed to the patient/family. Alternatives to my treatment were discussed. The note was completed using EMR. Every effort was made to ensure accuracy; however, inadvertent computerized transcription errors may be present.     Elsy Dickerson MD, ALEXANDRU, [facility], [address] [place] [address]  4/6/2022 10:47 AM

## 2022-04-07 LAB
ANION GAP SERPL CALCULATED.3IONS-SCNC: 12 MMOL/L (ref 3–16)
BUN BLDV-MCNC: 57 MG/DL (ref 7–20)
CALCIUM SERPL-MCNC: 9.4 MG/DL (ref 8.3–10.6)
CHLORIDE BLD-SCNC: 96 MMOL/L (ref 99–110)
CO2: 34 MMOL/L (ref 21–32)
CREAT SERPL-MCNC: 1.4 MG/DL (ref 0.8–1.3)
GFR AFRICAN AMERICAN: >60
GFR NON-AFRICAN AMERICAN: 50
GLUCOSE BLD-MCNC: 162 MG/DL (ref 70–99)
GLUCOSE BLD-MCNC: 198 MG/DL (ref 70–99)
GLUCOSE BLD-MCNC: 203 MG/DL (ref 70–99)
GLUCOSE BLD-MCNC: 211 MG/DL (ref 70–99)
GLUCOSE BLD-MCNC: 212 MG/DL (ref 70–99)
PERFORMED ON: ABNORMAL
POTASSIUM SERPL-SCNC: 3.9 MMOL/L (ref 3.5–5.1)
SODIUM BLD-SCNC: 142 MMOL/L (ref 136–145)

## 2022-04-07 PROCEDURE — 80048 BASIC METABOLIC PNL TOTAL CA: CPT

## 2022-04-07 PROCEDURE — 6370000000 HC RX 637 (ALT 250 FOR IP): Performed by: INTERNAL MEDICINE

## 2022-04-07 PROCEDURE — 6360000002 HC RX W HCPCS: Performed by: INTERNAL MEDICINE

## 2022-04-07 PROCEDURE — 97110 THERAPEUTIC EXERCISES: CPT

## 2022-04-07 PROCEDURE — 2580000003 HC RX 258: Performed by: NURSE PRACTITIONER

## 2022-04-07 PROCEDURE — 6370000000 HC RX 637 (ALT 250 FOR IP): Performed by: NURSE PRACTITIONER

## 2022-04-07 PROCEDURE — 2580000003 HC RX 258: Performed by: INTERNAL MEDICINE

## 2022-04-07 PROCEDURE — 6370000000 HC RX 637 (ALT 250 FOR IP): Performed by: STUDENT IN AN ORGANIZED HEALTH CARE EDUCATION/TRAINING PROGRAM

## 2022-04-07 PROCEDURE — 94761 N-INVAS EAR/PLS OXIMETRY MLT: CPT

## 2022-04-07 PROCEDURE — 97116 GAIT TRAINING THERAPY: CPT

## 2022-04-07 PROCEDURE — 1200000000 HC SEMI PRIVATE

## 2022-04-07 PROCEDURE — 36415 COLL VENOUS BLD VENIPUNCTURE: CPT

## 2022-04-07 PROCEDURE — 97535 SELF CARE MNGMENT TRAINING: CPT

## 2022-04-07 PROCEDURE — 6360000002 HC RX W HCPCS: Performed by: NURSE PRACTITIONER

## 2022-04-07 PROCEDURE — 97530 THERAPEUTIC ACTIVITIES: CPT

## 2022-04-07 PROCEDURE — 2700000000 HC OXYGEN THERAPY PER DAY

## 2022-04-07 PROCEDURE — 99232 SBSQ HOSP IP/OBS MODERATE 35: CPT | Performed by: NURSE PRACTITIONER

## 2022-04-07 RX ORDER — TORSEMIDE 100 MG/1
100 TABLET ORAL DAILY
Status: DISCONTINUED | OUTPATIENT
Start: 2022-04-08 | End: 2022-04-09 | Stop reason: HOSPADM

## 2022-04-07 RX ADMIN — INSULIN LISPRO 6 UNITS: 100 INJECTION, SOLUTION INTRAVENOUS; SUBCUTANEOUS at 09:04

## 2022-04-07 RX ADMIN — MICONAZOLE NITRATE: 20 CREAM TOPICAL at 20:34

## 2022-04-07 RX ADMIN — PREGABALIN 25 MG: 25 CAPSULE ORAL at 20:15

## 2022-04-07 RX ADMIN — INSULIN LISPRO 10 UNITS: 100 INJECTION, SOLUTION INTRAVENOUS; SUBCUTANEOUS at 11:53

## 2022-04-07 RX ADMIN — INSULIN LISPRO 3 UNITS: 100 INJECTION, SOLUTION INTRAVENOUS; SUBCUTANEOUS at 20:21

## 2022-04-07 RX ADMIN — INSULIN LISPRO 10 UNITS: 100 INJECTION, SOLUTION INTRAVENOUS; SUBCUTANEOUS at 17:46

## 2022-04-07 RX ADMIN — INSULIN GLARGINE 30 UNITS: 100 INJECTION, SOLUTION SUBCUTANEOUS at 09:07

## 2022-04-07 RX ADMIN — INSULIN LISPRO 10 UNITS: 100 INJECTION, SOLUTION INTRAVENOUS; SUBCUTANEOUS at 09:03

## 2022-04-07 RX ADMIN — TAMSULOSIN HYDROCHLORIDE 0.4 MG: 0.4 CAPSULE ORAL at 05:54

## 2022-04-07 RX ADMIN — FUROSEMIDE 5 MG/HR: 10 INJECTION, SOLUTION INTRAMUSCULAR; INTRAVENOUS at 11:55

## 2022-04-07 RX ADMIN — MICONAZOLE NITRATE: 20 CREAM TOPICAL at 09:09

## 2022-04-07 RX ADMIN — OXYCODONE AND ACETAMINOPHEN 1 TABLET: 5; 325 TABLET ORAL at 17:45

## 2022-04-07 RX ADMIN — INSULIN LISPRO 3 UNITS: 100 INJECTION, SOLUTION INTRAVENOUS; SUBCUTANEOUS at 17:46

## 2022-04-07 RX ADMIN — ISOSORBIDE MONONITRATE 15 MG: 30 TABLET, EXTENDED RELEASE ORAL at 09:02

## 2022-04-07 RX ADMIN — POLYETHYLENE GLYCOL 3350 17 G: 17 POWDER, FOR SOLUTION ORAL at 09:03

## 2022-04-07 RX ADMIN — ENOXAPARIN SODIUM 30 MG: 100 INJECTION SUBCUTANEOUS at 20:16

## 2022-04-07 RX ADMIN — SILVER SULFADIAZINE: 10 CREAM TOPICAL at 09:08

## 2022-04-07 RX ADMIN — ASPIRIN 81 MG 81 MG: 81 TABLET ORAL at 09:02

## 2022-04-07 RX ADMIN — Medication 10 ML: at 20:19

## 2022-04-07 RX ADMIN — CARVEDILOL 3.12 MG: 3.12 TABLET, FILM COATED ORAL at 09:02

## 2022-04-07 RX ADMIN — ATORVASTATIN CALCIUM 80 MG: 80 TABLET, FILM COATED ORAL at 20:15

## 2022-04-07 RX ADMIN — INSULIN LISPRO 6 UNITS: 100 INJECTION, SOLUTION INTRAVENOUS; SUBCUTANEOUS at 11:53

## 2022-04-07 RX ADMIN — OXYCODONE AND ACETAMINOPHEN 1 TABLET: 5; 325 TABLET ORAL at 05:54

## 2022-04-07 RX ADMIN — PREGABALIN 25 MG: 25 CAPSULE ORAL at 09:02

## 2022-04-07 RX ADMIN — ENOXAPARIN SODIUM 30 MG: 100 INJECTION SUBCUTANEOUS at 09:03

## 2022-04-07 RX ADMIN — POLYETHYLENE GLYCOL 3350 17 G: 17 POWDER, FOR SOLUTION ORAL at 20:16

## 2022-04-07 ASSESSMENT — PAIN SCALES - GENERAL
PAINLEVEL_OUTOF10: 7
PAINLEVEL_OUTOF10: 8
PAINLEVEL_OUTOF10: 8
PAINLEVEL_OUTOF10: 0
PAINLEVEL_OUTOF10: 0

## 2022-04-07 ASSESSMENT — PAIN DESCRIPTION - LOCATION
LOCATION: BACK
LOCATION: GENERALIZED

## 2022-04-07 ASSESSMENT — PAIN DESCRIPTION - PAIN TYPE
TYPE: CHRONIC PAIN
TYPE: CHRONIC PAIN

## 2022-04-07 NOTE — PLAN OF CARE
Problem: Pain:  Goal: Control of chronic pain  Description: Control of chronic pain  Outcome: Ongoing   Control of chronic pain is being managed with PRN medications as ordered. Pt verbalizes understanding on how and when to notify RN when pain is uncontrolled.

## 2022-04-07 NOTE — PROGRESS NOTES
Hospitalist Progress Note  4/7/2022 4:53 PM  Subjective:   Admit Date: 3/31/2022  PCP: Wesley Vasquez CNP Status:  Inpatient  Interval History: Hospital Day: 8, admitted with acute hypoxic respiratory failure (10 LPM oxygen on baseline 4 LPM) secondary to diastolic heart failure and refractory to increased oral diuretic prior to admission on furosemide gtt exacerbated by acute kidney injury on CKD stage 3a and type 2 diabetes with episodic hypoglycemia on insulin. Epistaxis from nasal cannula oxygen, left nostril, similar to previous episode requiring cauterization with Silver nitrate. Subjective: ROS negative. Feels improved today. increased ease on breathing when ambulating. D/w pt changing home diet to reflect inpt diet to have better control of DM. Diet: controlled carbohydrate (75 gm/meal)  Medications:   Furosemide at 5 mg/hr  linaclotide  290 mcg Oral QAM AC   insulin lispro  10 Units SubCUTAneous TID WC   tamsulosin  0.4 mg Oral Daily   aspirin  81 mg Oral Daily   atorvastatin  80 mg Oral Nightly   carvedilol  6.25 mg Oral BID WC   ferrous sulfate  325 mg Oral TID WC   insulin glargine  80 Units SubCUTAneous BID   isosorbide mononitrate  30 mg Oral Daily   oxycodone acetaminophen  1 tablet Oral 4x Daily   pregabalin  25 mg Oral BID   enoxaparin  30 mg SubCUTAneous BID   insulin lispro SSI   10 + 0-18 Units SubCUTAneous TID WC     No results for input(s): WBC, HGB, PLT, MCV in the last 72 hours. Recent Labs     04/05/22  0651 04/06/22  0710 04/07/22  0615    137 142   K 3.9 3.9 3.9   CL 92* 95* 96*   CO2 36* 34* 34*   BUN 67* 64* 57*   CREATININE 1.8* 1.6* 1.4*   GLUCOSE 132* 134* 162*     No results for input(s): AST, ALT, ALB, BILITOT, ALKPHOS in the last 72 hours. Troponin T:   No results for input(s): TROPONINI in the last 72 hours.   INR (3/31) 1.29  proBNP (3/21) 2,358 pg/mL  Magnesium (4/2) 3.00 mg/dL  Fe / TIBC (4/1) 42 / 268 = 16% iron saturation      POC Glucose:   Recent Labs 04/06/22  1638 04/06/22 2026 04/07/22  0752 04/07/22  1133   POCGLU 103* 146* 203* 211*     TTE (1/13/22)  Limited only f/u for LVEF and aortic valve stenosis. Technically difficult examination.  Low normal left ventricular systolic function with ejection fraction of 50%. Moderate aortic stenosis. Aortic stenosis values appear approximately same when compared to echo on 8-. Mild to moderate aortic regurgitation. Objective:   Vitals:  BP (!) 108/55   Pulse 63   Temp 98.5 °F (36.9 °C) (Oral)   Resp 15   Ht 5' 9\" (1.753 m)   Wt 287 lb 1.6 oz (130.2 kg)   SpO2 95%   BMI 42.40 kg/m²   General appearance: alert and cooperative with exam  Lungs: 3L NC. Bilateral crackles on exam  Heart: rrr 2/6 systolic murmur noted worst on inspiration   Abdomen: soft, non-tender; bowel sounds normal; no masses,  no organomegaly  Extremities: 2+pitting edema bilaterally with chronic venous insufficiency bilaterally, Left leg with weeping wound  Neurologic: No obvious focal neurologic deficits. Assessment and Plan:     Acute hypoxic respiratory failure initially on 10 LPM and now down to baseline 4 LPM with diuresis. Improving. Asked nursing to wean oxygen to >90% with least amount of oxygen. Diastolic heart failure on furosemide gtt. Continue carvedilol 6.25 mg BID, maintain SBP > 100. ACE/ARB contraindicated by MARGARITO/CKD. Acute kidney injury on CKD stage 3a. Improving. (baseline 1.5-1.7). Followed by nephrology. CAD on aspirin, statin, and beta blocker. Prior CABG 2010, [facility] in 2012 with patent grafts, mixed ischemia and scar on stress test March 2022 - medical management. Elevated troponin likely supply / demand mismatch. No ischemic evaluation at this time per cardiology. Iron deficiency anemia in setting of heart failure:  Venofer 200 mg IV daily x 3 days. He received 3 doses of IV Venofer in August 2021. He has received IV iron infusions in the past. NO blood products.      Type 2 Diabetes (controlled, HgbA1c 7.0%) with episodes of hypoglycemia. Cover with a \"sliding scale\" lispro moderate scale prandial correction insulin. Complicated by episodes of hypoglycemia. Adjusted basal insulin  to wt based dosing. Improving. Will need outpt follow up. Dyslipidemia (LDL 65) on atorvastatin 80 mg nightly. Opioid induced constipation on linaclotide. HENNA baseline. Class III obesity (BMI 81/3) complicates medical management. Epistaxis- likely related to dry air from continuous O2. Resolving. Trial afrin x 3 days    Intermittent irregularly irregular rhythm noted on tele. reflect afib at times. Will have cardiology weigh in. CHADSVASc would be high. Noted temp of 100.2 04/05 in chart but pt without focal or systemic signs of infection. Monitor clinically    Advance Directive: Full Code  DVT prophylaxis with enoxaparin 30 mg sub-Q BID. Discharge plannin-2 more days inpatient status.   Will need to determine home diuretic regimen prior to [address], MD  [address] [place] [address]

## 2022-04-07 NOTE — PROGRESS NOTES
Bedside report given to [facility] MADHU. Call light and bedside table within reach. No needs voiced at this time. Bed in lowest position, brakes locked. Pt continues to refuse bed alarm.

## 2022-04-07 NOTE — CARE COORDINATION
Pt still considering options regarding home w/HHC vs SNF. [facility] is precerting pt at this time. CM will continue to follow for needs. Pt is currently on [place] waiting list for the [facility] and is active w/[employer] for home O2, [facility] and [facility].   Bentley Lynn RN

## 2022-04-07 NOTE — PROGRESS NOTES
[facility] Progress Note           Subjective/   75y.o. year old male who we are seeing in consultation for MARGARITO/CKD-3. HPI:  The patient was seen and examined; he feels well today with no CP, SOB, nausea or vomiting. ROS: No fever or chills. Social: No family at bedside. Objective/   GEN:  Chronically ill, BP (!) 115/54   Pulse 66   Temp 98.2 °F (36.8 °C) (Oral)   Resp 18   Ht 5' 9\" (1.753 m)   Wt 287 lb 1.6 oz (130.2 kg)   SpO2 92%   BMI 42.40 kg/m²      HEENT: non-icteric, no JVD  CV: S1, S2 without m/r/g; ++ LE edema  RESP: CTA B without w/r/r; breathing wnl  ABD: +bs, soft, nt, no hsm  SKIN: warm, no rashes    Data/  No results for input(s): WBC, HGB, HCT, MCV, PLT in the last 72 hours.   Recent Labs     04/05/22  0651 04/06/22  0710 04/07/22  0615    137 142   K 3.9 3.9 3.9   CL 92* 95* 96*   CO2 36* 34* 34*   GLUCOSE 132* 134* 162*   MG 2.60*  --   --    BUN 67* 64* 57*   CREATININE 1.8* 1.6* 1.4*   LABGLOM 38* 43* 50*   GFRAA 46* 52* >60       Assessment/     - Acute on chronic kidney disease stage 3              Worsening in setting of CHF decompensation, hx of volatile fluctuations in levels   based on volume status & diuretic use              Background DM Nephropathy + Cardiorenal syndrome              Baseline SCr mid 1 range, 1.5-1.7              Follows with Dr. Sangita Valentine in office   Improving with diuresis                - dCHF exacerbation     - DM2 - poorly controlled     - Normocytic anemia, chronic - Hb at target     - [place]    Plan/     - We will switch IV Lasix drip to daily Torsemide  - Trend labs, weights, bp's, & urine output

## 2022-04-07 NOTE — PROGRESS NOTES
Physical Therapy  Facility/Department: [facility] A2 CARD TELEMETRY  Daily Treatment Note  NAME: Sidra Eric  : 1955  MRN: [id number]    Date of Service: 2022    Discharge Recommendations:  Subacute/Skilled Nursing Facility   PT Equipment Recommendations  Equipment Needed: No    Assessment   Body structures, Functions, Activity limitations: Decreased functional mobility ; Decreased balance;Decreased posture;Decreased strength;Decreased endurance;Decreased coordination  Assessment: Pt tolerated therapy well this date. Motivated to participate and asking to walk more than once per day. Educated on asking nursing staff including RN and PCA to assist pt with ambulating in shen. Pt verbalized understanding. SBA for mobility this date. Tolerated seated exercises well. Limited d/t decreased activity tolerance and TAYLOR. Continue to recommend SNF at d/c due to current deficits and pt lives alone. Treatment Diagnosis: impaired functional mobility  Specific instructions for Next Treatment: progress mobility as tolerated  Prognosis: Good  Decision Making: Low Complexity  PT Education: Goals; General Safety;Gait Training;PT Role;Disease Specific Education;Plan of Care; Functional Mobility Training;Transfer Training;Home Exercise Program  Patient Education: Pt educated on role of PT, importance of OOB mobility, safe mobility with AD -- pt verbalized understanding  Barriers to Learning: none  REQUIRES PT FOLLOW UP: Yes  Activity Tolerance  Activity Tolerance: Patient Tolerated treatment well;Patient limited by fatigue;Patient limited by endurance  Activity Tolerance: /75; Post gait training HR 79; SpO2 81% on 3L. Increased to 92% with seated rest and cues for pursed lip breathing     Patient Diagnosis(es): The primary encounter diagnosis was Acute on chronic congestive heart failure, unspecified heart failure type ([facility] [address]  Diagnoses of Acute respiratory failure with hypoxia (HCC) and Acute on chronic renal insufficiency were also pertinent to this visit. has a past medical history of Anemia, Angina, Arthritis, CAD (coronary artery disease), CHF (congestive heart failure) ([facility] [address] CKD (chronic kidney disease) stage 3, GFR 30-59 ml/min ([facility]), Clostridium difficile diarrhea, Diabetes mellitus ([facility] [address] Diabetic neuropathy ([facility] [address] Disease of blood and blood forming organ, Dizziness, GERD (gastroesophageal reflux disease), Headache, Hearing loss, Hyperlipidemia, Hypertension, Kidney stone, Refusal of blood transfusions as patient is Yarsani, Sleep apnea, Tinnitus, Venous ulcer ([facility] [address] and Wound, open. has a past surgical history that includes hernia repair (age1); Cholecystectomy (9/2011); other surgical history (11-16-11 REPAIR LOWER STERNAL INCISION, REMOVAL OF ONE STERNAL WIRE,); Upper gastrointestinal endoscopy; Cardiac surgery; other surgical history (10/10/2014); and Pain management procedure (N/A, 2/10/2022). Restrictions  Restrictions/Precautions  Restrictions/Precautions: Up as Tolerated,General Precautions  Position Activity Restriction  Other position/activity restrictions: up as tolerated, O2, wounds BLE  Subjective   General  Chart Reviewed: Yes  Response To Previous Treatment: Patient with no complaints from previous session. Family / Caregiver Present: No  Referring Practitioner: Dr. Mrais Clarke MD  Subjective  Subjective: Pt seated EOB on approach; agreeable to therapy.  Wants to walk more during the day  General Comment  Comments: RN cleared pt for therapy  Pain Screening  Patient Currently in Pain: Denies        Objective   Bed mobility  Supine to Sit: Unable to assess  Sit to Supine: Unable to assess  Comment: pt seated EOB at start and end of session     Transfers  Sit to Stand: Stand by assistance  Stand to sit: Stand by assistance     Ambulation  Ambulation?: Yes  Ambulation 1  Surface: level tile  Device: Rolling Walker  Other Apparatus: O2 (3L)  Assistance: Stand by assistance  Quality of Gait: Slow reciprocal pattern, B decreased toe clearance and heel strike, forward flexed trunk. Steady without overt LOB; Cues for posture d/t forward flexion with improvement noted  Gait Deviations: Shuffles; Slow Chrissy;Decreased step length;Decreased step height; Increased CHAPARRITA  Distance: 120 ft  Comments: Distance limited d/t fatigue and TAYLOR           Exercises  Hip Flexion: Seated marches x 20 BLE  Hip Abduction: Seated clamshell x 20 BLE  Knee Long Arc Quad: x 20 BLE  Ankle Pumps: 2 x 20 BLE  Comments: Cues for technique and to slow pace of activity to improve control                                   AM-PAC Score     AM-PAC Inpatient Mobility without Stair Climbing Raw Score : 15 (04/07/22 0901)  AM-PAC Inpatient without Stair Climbing T-Scale Score : 43.03 (04/07/22 0901)  Mobility Inpatient CMS 0-100% Score: 47.43 (04/07/22 0901)  Mobility Inpatient without Stair CMS G-Code Modifier : CK (04/07/22 0901)       Goals  Short term goals  Time Frame for Short term goals: 1 week (4/12) unless otherwise specified  Short term goal 1: Pt will be supervision with supine<>sit. -- 4/07: DNT  Short term goal 2: Pt will be supervision with transfers with LRAD. -- 4/07: SBA with RW  Short term goal 3: Pt will ambulate 150 ft with SBA and LRAD. -- 4/07: 120' with RW SBA  Short term goal 4: 4/09: Pt will partcipate in 12-15 reps of BLE exercises to promote strength and activity tolerance.  -- GOAL MET 4/07  Patient Goals   Patient goals : \"to walk and get stronger\"    Plan    Plan  Times per week: 3-5x/wk  Times per day: Daily  Specific instructions for Next Treatment: progress mobility as tolerated  Current Treatment Recommendations: Strengthening,Neuromuscular Re-education,Home Exercise Program,Balance Training,Endurance Training,Safety Education & Training,Functional Mobility Training,Transfer Training,Gait Training,Equipment Evaluation, Education, & procurement,Patient/Caregiver Education & Training  Safety Devices  Type of devices: All fall risk precautions in place,Call light within reach,Gait belt,Nurse notified (pt left seated EOB, no alarm on approach -- pt refusing bed alarm)     Therapy Time   Individual Concurrent Group Co-treatment   Time In 0801         Time Out 0826         Minutes 25         Timed Code Treatment Minutes: 72 Isaac Rae, PT, DPT  If pt is unable to be seen after this session, please let this note serve as discharge summary. Please see case management note for discharge disposition. Thank you.

## 2022-04-07 NOTE — PLAN OF CARE
Problem: Serum Glucose Level - Abnormal:  Goal: Ability to maintain appropriate glucose levels will improve  Description: Ability to maintain appropriate glucose levels will improve  Outcome: Ongoing   Continuing to monitor blood glucose levels and administer ordered insulin as appropriate. Problem: FLUID AND ELECTROLYTE IMBALANCE  Goal: Fluid and electrolyte balance are achieved/maintained  Outcome: Ongoing     Patient's EF (Ejection Fraction) is greater than 40%    Heart Failure Medications:  Diuretics[de-identified] Furosemide    (One of the following REQUIRED for EF </= 40%/SYSTOLIC FAILURE but MAY be used in EF% >40%/DIASTOLIC FAILURE)        ACE[de-identified] None        ARB[de-identified] None         ARNI[de-identified] None    (Beta Blockers)  NON- Evidenced Based Beta Blocker (for EF% >40%/DIASTOLIC FAILURE): None    Evidenced Based Beta Blocker::(REQUIRED for EF% <40%/SYSTOLIC FAILURE) Carvedilol- Coreg  . .................................................................................................................................................. Patient's weights and intake/output reviewed: Yes    Patient's Last Weight: 289 lbs obtained by standing scale. Difference of 0 lbs less than last documented weight.       Intake/Output Summary (Last 24 hours) at 4/6/2022 2329  Last data filed at 4/6/2022 2321  Gross per 24 hour   Intake 2084.93 ml   Output 2650 ml   Net -565.07 ml       Comorbidities Reviewed Yes    Patient has a past medical history of Anemia, Angina, Arthritis, CAD (coronary artery disease), CHF (congestive heart failure) ([facility] [address] CKD (chronic kidney disease) stage 3, GFR 30-59 ml/min ([facility]), Clostridium difficile diarrhea, Diabetes mellitus ([facility] [address] Diabetic neuropathy ([facility] [address] Disease of blood and blood forming organ, Dizziness, GERD (gastroesophageal reflux disease), Headache, Hearing loss, Hyperlipidemia, Hypertension, Kidney stone, Refusal of blood transfusions as patient is Orthodoxy, Sleep apnea, Tinnitus, Venous ulcer ([facility] [address] and Wound, open. >>For CHF and Comorbidity documentation on Education Time and Topics, please see Education Tab    Progressive Mobility Assessment:  What is this patient's Current Level of Mobility?: Ambulatory-Up Ad Mami  How was this patient Mobilized today?: Edge of Bed, Up to Chair,  Up to Toilet/Shower, and Up in Room, ambulated 200 ft                 With Whom? Nurse, PCA, and Self                 Level of Difficulty/Assistance: 1x Assist     Pt resting in bed at this time on  3 L O2. Pt denies shortness of breath. Pt with pitting lower extremity edema.      Patient and/or Family's stated Goal of Care this Admission: reduce shortness of breath, increase activity tolerance, better understand heart failure and disease management, be more comfortable, and reduce lower extremity edema prior to discharge        :

## 2022-04-07 NOTE — PROGRESS NOTES
Nutrition Assessment     Type and Reason for Visit: Initial,RD Nutrition Re-Screen/LOS    Nutrition Recommendations/Plan:   Continue carb control, 2 gm sodium diet with 1500 mL FR    Monitor diet education needs   Monitor nutrition adequacy, pertinent labs, bowel habits, wt changes, and clinical progress    Nutrition Assessment:  Pt admitted with CHF. Hx of daibetes. Pt nutritionally stable AEB PO intakes of %. Pt sleeping soundly at time of visit. Weights trending down this admission likely r/t diuresis. Handouts on low sodium, fluid restriction and weight monitoring left at bedside. Will continue to monitor. Malnutrition Assessment:  Malnutrition Status: No malnutrition    Nutrition Related Findings: -7.5 L per I&Os this admission. Labs reviewed. +BS. Last BM on 4/6. +2 generalized and +2 pitting BLE edema. Current Nutrition Therapies:    ADULT DIET; Regular; 5 carb choices (75 gm/meal);  Low Sodium (2 gm); 1500 ml    Anthropometric Measures:  · Height: 5' 9\" (175.3 cm)  · Current Body Wt: 287 lb (130.2 kg)   · BMI: 42.4    Nutrition Diagnosis:   No nutrition diagnosis at this time     Nutrition Interventions:   Food and/or Nutrient Delivery:  Continue Current Diet  Nutrition Education/Counseling:  Education needed (previously declined this admission, handout left at bedside)   Coordination of Nutrition Care:  Continue to monitor while inpatient    Discharge Planning:    Continue current diet     Electronically signed by Emery Alvarado, MS, RD, LD on 4/7/22 at 1:49 PM EDT    Contact: [phone]

## 2022-04-07 NOTE — CONSULTS
[address] [facility] Eva Ernandez 1955    History:  Past Medical History:   Diagnosis Date    Anemia     Angina     Arthritis     CAD (coronary artery disease)     CHF (congestive heart failure) (HCC)     CKD (chronic kidney disease) stage 3, GFR 30-59 ml/min ([facility])     Clostridium difficile diarrhea 3/16/12; 2/29/12    positive stool toxin    Diabetes mellitus ([address]     Diabetic neuropathy ([facility] [address]     feet and legs    Disease of blood and blood forming organ     Dizziness     When he moves his head/ loses his balance    GERD (gastroesophageal reflux disease)     gastric ulcer    Headache     Hearing loss     Hyperlipidemia     Hypertension     Kidney stone 2002    Refusal of blood transfusions as patient is Confucianism     Sleep apnea     Tinnitus     Venous ulcer (HCC)     LLE    Wound, open 1/13/2012       ECHO:  1/14/22  EF 50%  HgA1C:  1/13/22  7.6  Iron Saturation:  4/1/22  16    ACE/ARB:   BB: Carvedilol 3.125mg BID    Imdur 15mg daily    Last Hospital Admission:  3/7/22  Acute/chronic CHF    Discharge plans: home vs SNF, plan has been for [facility] however pt considering return home. Advanced Directives: patient has advance directives scanned in the chart      Chart review completed. Patient a 75year old male, familiar to writer from previous admissions. Pt transitioning to PO lasix. Pt stated that he has been working to get into [facility]. He is considering going to the SNF and transitioning to [place]. There they will monitor for S/s and daily weights. Also will assist with diet and fluid restrictions. Pt barriers are getting to follow up appts as he is blind and has transportation barriers. Does follow with [facility].       Patient recent weights and intake/output reviewed:    Patient Vitals for the past 96 hrs (Last 3 readings):   Weight   04/07/22 0326 287 lb 1.6 oz (130.2 kg)   04/06/22 0522 289 lb 3.2 oz (131.2 kg)   04/05/22 0328 289 lb 8 oz (131.3 kg)         Intake/Output Summary (Last 24 hours) at 4/7/2022 1558  Last data filed at 4/7/2022 1422  Gross per 24 hour   Intake 1560 ml   Output 2525 ml   Net -965 ml     Education Time: chart review completed.       Miguel Pa RN       4/7/2022 3:58 PM

## 2022-04-07 NOTE — PLAN OF CARE
Problem: OXYGENATION/RESPIRATORY FUNCTION  Goal: Patient will maintain patent airway  Outcome: Ongoing     Patient's EF (Ejection Fraction) is greater than 40%    Heart Failure Medications:  Diuretics[de-identified] Furosemide    (One of the following REQUIRED for EF </= 40%/SYSTOLIC FAILURE but MAY be used in EF% >40%/DIASTOLIC FAILURE)        ACE[de-identified] None        ARB[de-identified] None         ARNI[de-identified] None    (Beta Blockers)  NON- Evidenced Based Beta Blocker (for EF% >40%/DIASTOLIC FAILURE): None    Evidenced Based Beta Blocker::(REQUIRED for EF% <40%/SYSTOLIC FAILURE) Carvedilol- Coreg  . .................................................................................................................................................. Patient's weights and intake/output reviewed: Yes    Patient's Last Weight: 287 lbs obtained by standing scale. Difference of 2 lbs less than last documented weight. Intake/Output Summary (Last 24 hours) at 4/7/2022 1334  Last data filed at 4/7/2022 1156  Gross per 24 hour   Intake 1680 ml   Output 2725 ml   Net -1045 ml       Comorbidities Reviewed Yes    Patient has a past medical history of Anemia, Angina, Arthritis, CAD (coronary artery disease), [facility]congest[facility]) [facility] CKD (chronic kidney disease) stage 3, GFR 30-59 ml/min ([facility]), Clostridium difficile diarrhea, Diabetes mellitus ([facility] [address] Diabetic neuropathy ([facility] Disease of blood and blood forming organ, Dizziness, GERD (gastroesophageal reflux disease), Headache, Hearing loss, Hyperlipidemia, Hypertension, Kidney stone, Refusal of blood transfusions as patient is Samaritan, Sleep apnea, Tinnitus, Venous ulcer ([address] and Wound, open.      >>For CHF and Comorbidity documentation on Education Time and Topics, please see Education Tab    Progressive Mobility Assessment:  What is this patient's Current Level of Mobility?: Ambulatory-Up Ad Mami  How was this patient Mobilized today?: Edge of Bed, Up to Chair,  Up to Toilet/Shower, and Up in Room, ambulated 15 ft                 With Whom? Nurse, PCA, PT, and OT                 Level of Difficulty/Assistance: 1x Assist     Pt resting in bed at this time on  3 L O2. Pt with complaints of shortness of breath. Pt with pitting lower extremity edema.      Patient and/or Family's stated Goal of Care this Admission: reduce shortness of breath, increase activity tolerance, better understand heart failure and disease management, be more comfortable, and reduce lower extremity edema prior to discharge        :

## 2022-04-07 NOTE — PROGRESS NOTES
Occupational Therapy  Facility/Department: [facility] A2 CARD TELEMETRY  Daily Treatment Note  NAME: Faith Roman  : 1955  MRN: [id number]    Date of Service: 2022    Discharge Recommendations:  Home with assist PRN,S Level 3,Home with nursing aide,Home with Home health OT  OT Equipment Recommendations  Other: None; pt has a reacher, sock aide, and tub transfer bench    Assessment   Performance deficits / Impairments: Decreased functional mobility ; Decreased safe awareness;Decreased balance;Decreased ADL status; Decreased endurance;Decreased high-level IADLs  Assessment: Pt continues to make good progress towards goals. Pt able to complete standing grooming tasks at sink with mod I. Pt tolerated additional mobility in hallway with RW and distance sup. Anticipate safe d/c home with PRN assist, [address] [place] [address], and nursing aide. Prognosis: Good  OT Education: OT Role;Transfer Training;Equipment;Plan of Care;ADL Adaptive Strategies; Low Vision Education  Patient Education: home safety concerns, OT discharge recommendations, standing balance, recommend use of RW for ambulation, toilet transfers, safety with toileting/dressing  Barriers to Learning: pt demonstrates and verbalizes understanding. Activity Tolerance  Activity Tolerance: Patient Tolerated treatment well  Activity Tolerance: Pt tolerated increased mobility and ADLs this date on baseline O2  Safety Devices  Safety Devices in place: Yes  Type of devices: Call light within reach;Gait belt;Nurse notified; All fall risk precautions in place; Left in bed;Bed alarm in place         Patient Diagnosis(es): The primary encounter diagnosis was Acute on chronic congestive heart failure, unspecified heart failure type ([facility] [address] Diagnoses of Acute respiratory failure with hypoxia (HCC) and Acute on chronic renal insufficiency were also pertinent to this visit.       has a past medical history of Anemia, Angina, Arthritis, CAD (coronary artery disease), CHF (congestive heart failure) ([facility] [address] CKD (chronic kidney disease) stage 3, GFR 30-59 ml/min ([facility]), Clostridium difficile diarrhea, Diabetes mellitus ([facility] [address] Diabetic neuropathy ([facility] [address] Disease of blood and blood forming organ, Dizziness, GERD (gastroesophageal reflux disease), Headache, Hearing loss, Hyperlipidemia, Hypertension, Kidney stone, Refusal of blood transfusions as patient is Gnosticist, Sleep apnea, Tinnitus, Venous ulcer ([facility] [address] and Wound, open. has a past surgical history that includes hernia repair (age1); Cholecystectomy (9/2011); other surgical history (11-16-11 REPAIR LOWER STERNAL INCISION, REMOVAL OF ONE STERNAL WIRE,); Upper gastrointestinal endoscopy; Cardiac surgery; other surgical history (10/10/2014); and Pain management procedure (N/A, 2/10/2022). Restrictions  Restrictions/Precautions  Restrictions/Precautions: Up as Tolerated,General Precautions  Position Activity Restriction  Other position/activity restrictions: up as tolerated, O2, wounds BLE  Subjective   General  Chart Reviewed: Yes  Patient assessed for rehabilitation services?: Yes  Additional Pertinent Hx: arthritis, CAD, CHf, DM, neuropathy, anemia, CKd, hearing loss, CABG x3, hernia repair, GARY, venous ulcer, obesity. Family / Caregiver Present: No  Diagnosis: Pt presents with SOB. Pt admitted with acute on chronic diastolic heart failure, moderate aortic stenosis, elevated troponin, MARGARITO, acute hypoxic respiratory failure. Subjective  Subjective: pt seated EOB upon OT arrival. Pt reports that he is feeling better than yesterday. Agreeable to OT. Pt reporting frustration with not being able to get out of bed without A  General Comment  Comments: RN agreeable to OT session.       Orientation  Orientation  Overall Orientation Status: Within Functional Limits  Objective    ADL  Grooming: Independent  UE Dressing: Supervision  Toileting: Supervision        Balance  Sitting Balance: Independent  Standing Balance: Supervision  Functional Mobility  Functional - Mobility Device: Rolling Walker  Activity: To/from bathroom; Other  Functional Mobility Comments: 3L of O2; baseline O2- Able to ambulate out to RN station and back  Bed mobility  Supine to Sit: Independent  Sit to Supine: Independent  Transfers  Sit to stand: Independent  Stand to sit:  Independent                       Cognition  Overall Cognitive Status: [facility]                                         Plan   Plan  Times per week: 3-5x/week  Current Treatment Recommendations: Strengthening,Endurance Training,Patient/Caregiver Education & Training,Equipment Evaluation, Education, & procurement,Self-Care / ADL,Balance Training,Home Management Training,Pain Management,Functional Mobility Training,Safety Education & Training,Positioning    Goals  Short term goals  Time Frame for Short term goals: 2 weeks (4/19/22)  Short term goal 1: Mod I functional tranfers to/from EOB, chair, toilet; - GOAL MET  Short term goal 2: SBA LB dressing with AE as needed; ongoing,  Short term goal 3: Mod I toileting; ongoing,  Short term goal 4: Mod I grooming in stance at sink; GOAL MET       Therapy Time   Individual Concurrent Group Co-treatment   Time In 1350         Time Out 1445         Minutes 55         Timed Code Treatment Minutes: [address], OTR/L

## 2022-04-07 NOTE — PROGRESS NOTES
[address]   Daily Progress Note    Admit Date:  3/31/2022  HPI:    Chief Complaint   Patient presents with    Shortness of Breath     medics report shortness of breath, pt on home oxygen 4 liters, pt placed on non rebreather. pt wheezing. pt given douneb. 10 liters  bs 268    Leg Problem     pt seeing wound care for left leg wheeping. pt reports this was tuesday. Interval history: Cari Ricks is being followed for shortness of breath. Has been treated with lasix infusion this admission. Subjective:  Mr. Travis Sanders continues with LE edema but improving.  Off of lasix infusion     Objective:   BP (!) 115/54   Pulse 66   Temp 98.2 °F (36.8 °C) (Oral)   Resp 18   Ht 5' 9\" (1.753 m)   Wt 287 lb 1.6 oz (130.2 kg)   SpO2 92%   BMI 42.40 kg/m²       Intake/Output Summary (Last 24 hours) at 4/7/2022 1457  Last data filed at 4/7/2022 1422  Gross per 24 hour   Intake 1560 ml   Output 2525 ml   Net -965 ml       NYHA: IV    Physical Exam:  General:  Awake, alert, NAD  Skin:  Warm and dry  Neck:  JVD ~ 10-12 cm H20   Chest:  Few crackles in the bases to auscultation, no wheezes/rhonchi  Telemetry: SR rate 70's   Cardiovascular:  RRR S1S2, + murmur, no r/g   Abdomen:  Soft, nontender, +bowel sounds  Extremities:  ++ bilateral lower extremity edema, + wrinkling of the BLE skin, + left leg wrapped     Medications:    [START ON 4/8/2022] torsemide  100 mg Oral Daily    polyethylene glycol  17 g Oral BID    isosorbide mononitrate  15 mg Oral Daily    insulin glargine  30 Units SubCUTAneous QAM    carvedilol  3.125 mg Oral BID WC    linaclotide  290 mcg Oral QAM AC    insulin lispro  10 Units SubCUTAneous TID WC    tamsulosin  0.4 mg Oral Daily    miconazole   Topical BID    aspirin  81 mg Oral Daily    atorvastatin  80 mg Oral Nightly    silver sulfADIAZINE   Topical Daily    pregabalin  25 mg Oral BID    sodium chloride flush  5-40 mL IntraVENous 2 times per day    enoxaparin  30 mg SubCUTAneous BID    insulin lispro  0-18 Units SubCUTAneous TID WC    insulin lispro  0-9 Units SubCUTAneous Nightly      sodium chloride      dextrose         Lab Data:  CBC: No results for input(s): WBC, HGB, PLT in the last 72 hours. BMP:    Recent Labs     04/05/22  0651 04/06/22  0710 04/07/22  0615    137 142   K 3.9 3.9 3.9   CO2 36* 34* 34*   BUN 67* 64* 57*   CREATININE 1.8* 1.6* 1.4*     INR:  No results for input(s): INR in the last 72 hours. BNP:    Recent Labs     04/06/22  0710   PROBNP 1,089*     Lab Results   Component Value Date    LVEF 67 03/10/2022       Testing:  Echo 1/13/22  Summary   Limited only f/u for LVEF and aortic valve stenosis. Technically difficult examination. Low normal left ventricular systolic function with ejection fraction of 50%. Moderate aortic stenosis. Aortic stenosis values appear approximately same when compared to echo on   8-. Mild to moderate aortic regurgitation. 04/07/22 0326 287 lb 1.6 oz (130.2 kg) Actual;Standing scale      04/06/22 05:22:23 289 lb 3.2 oz (131.2 kg) Standing scale     04/05/22 03:28:48 289 lb 8 oz (131.3 kg) Standing scale     04/04/22 03:12:25 291 lb (132 kg) Standing scale     04/03/22 03:26:10 293 lb (132.9 kg) Standing scale     04/02/22 0329 292 lb (132.5 kg) Standing scale     04/01/22 0015 296 lb 8 oz (134.5 kg) Standing scale       Principal Problem:    Acute respiratory failure with hypoxia (HCC)  Active Problems:    DM (diabetes mellitus) ([facility] [address]    Coronary artery disease involving native coronary artery of native heart without angina pectoris    Essential hypertension    HLD (hyperlipidemia)    Obesity, Class III, BMI 40-49.9 (morbid obesity) (HCC)    Acute on chronic diastolic CHF (congestive heart failure) ([facility] [address]    Acute on chronic renal insufficiency  Resolved Problems:    * No resolved hospital problems.  *    Assessment:  Acute on chronic diastolic heart failure  Acute on chronic hypoxemic respiratory failure  MARGARITO on CKD- improved with diuresis   CAD - med management recommended  DM  HLD  Iron def anemia   Severe HENNA  Hx of non-adherence to medication regimen     Plan:  Daily weights  Daily BMP  Strict I/o's  Lasix infusion per nephrology --> changing to PO torsemide  Continue aspirin and statin  Coreg with hold parameters  Continue imdur for CAD  NO ace/arb/arni given variable creatinine/MARGARITO - consider adding at discharge if okay with nephrology     Discussed blood sugars and diet today. He verbalizes the need for better control of his diet.  Education time spent 10 minutes     Discharge planning    PATRICK Harris - CNP,  4/7/2022, 4:15 PM

## 2022-04-08 ENCOUNTER — TELEPHONE (OUTPATIENT)
Dept: CARDIOLOGY | Age: 67
End: 2022-04-08

## 2022-04-08 LAB
ANION GAP SERPL CALCULATED.3IONS-SCNC: 9 MMOL/L (ref 3–16)
BUN BLDV-MCNC: 60 MG/DL (ref 7–20)
CALCIUM SERPL-MCNC: 9.3 MG/DL (ref 8.3–10.6)
CHLORIDE BLD-SCNC: 93 MMOL/L (ref 99–110)
CO2: 33 MMOL/L (ref 21–32)
CREAT SERPL-MCNC: 1.4 MG/DL (ref 0.8–1.3)
GFR AFRICAN AMERICAN: >60
GFR NON-AFRICAN AMERICAN: 50
GLUCOSE BLD-MCNC: 140 MG/DL (ref 70–99)
GLUCOSE BLD-MCNC: 177 MG/DL (ref 70–99)
GLUCOSE BLD-MCNC: 225 MG/DL (ref 70–99)
GLUCOSE BLD-MCNC: 230 MG/DL (ref 70–99)
GLUCOSE BLD-MCNC: 237 MG/DL (ref 70–99)
PERFORMED ON: ABNORMAL
POTASSIUM SERPL-SCNC: 4 MMOL/L (ref 3.5–5.1)
REASON FOR REJECTION: NORMAL
REJECTED TEST: NORMAL
SODIUM BLD-SCNC: 135 MMOL/L (ref 136–145)

## 2022-04-08 PROCEDURE — 99233 SBSQ HOSP IP/OBS HIGH 50: CPT | Performed by: INTERNAL MEDICINE

## 2022-04-08 PROCEDURE — 80048 BASIC METABOLIC PNL TOTAL CA: CPT

## 2022-04-08 PROCEDURE — 6360000002 HC RX W HCPCS: Performed by: NURSE PRACTITIONER

## 2022-04-08 PROCEDURE — 6370000000 HC RX 637 (ALT 250 FOR IP): Performed by: NURSE PRACTITIONER

## 2022-04-08 PROCEDURE — 36415 COLL VENOUS BLD VENIPUNCTURE: CPT

## 2022-04-08 PROCEDURE — 6370000000 HC RX 637 (ALT 250 FOR IP): Performed by: INTERNAL MEDICINE

## 2022-04-08 PROCEDURE — 2580000003 HC RX 258: Performed by: NURSE PRACTITIONER

## 2022-04-08 PROCEDURE — 6370000000 HC RX 637 (ALT 250 FOR IP): Performed by: STUDENT IN AN ORGANIZED HEALTH CARE EDUCATION/TRAINING PROGRAM

## 2022-04-08 PROCEDURE — 94761 N-INVAS EAR/PLS OXIMETRY MLT: CPT

## 2022-04-08 PROCEDURE — 2700000000 HC OXYGEN THERAPY PER DAY

## 2022-04-08 PROCEDURE — 97530 THERAPEUTIC ACTIVITIES: CPT

## 2022-04-08 PROCEDURE — 1200000000 HC SEMI PRIVATE

## 2022-04-08 RX ORDER — INSULIN GLARGINE 100 [IU]/ML
35 INJECTION, SOLUTION SUBCUTANEOUS EVERY MORNING
Status: DISCONTINUED | OUTPATIENT
Start: 2022-04-09 | End: 2022-04-09 | Stop reason: HOSPADM

## 2022-04-08 RX ORDER — INSULIN GLARGINE 100 [IU]/ML
32 INJECTION, SOLUTION SUBCUTANEOUS EVERY MORNING
Status: DISCONTINUED | OUTPATIENT
Start: 2022-04-08 | End: 2022-04-08

## 2022-04-08 RX ADMIN — INSULIN LISPRO 10 UNITS: 100 INJECTION, SOLUTION INTRAVENOUS; SUBCUTANEOUS at 09:16

## 2022-04-08 RX ADMIN — ATORVASTATIN CALCIUM 80 MG: 80 TABLET, FILM COATED ORAL at 21:22

## 2022-04-08 RX ADMIN — INSULIN GLARGINE 32 UNITS: 100 INJECTION, SOLUTION SUBCUTANEOUS at 09:16

## 2022-04-08 RX ADMIN — INSULIN LISPRO 10 UNITS: 100 INJECTION, SOLUTION INTRAVENOUS; SUBCUTANEOUS at 12:46

## 2022-04-08 RX ADMIN — PREGABALIN 25 MG: 25 CAPSULE ORAL at 21:22

## 2022-04-08 RX ADMIN — INSULIN LISPRO 6 UNITS: 100 INJECTION, SOLUTION INTRAVENOUS; SUBCUTANEOUS at 09:16

## 2022-04-08 RX ADMIN — ENOXAPARIN SODIUM 30 MG: 100 INJECTION SUBCUTANEOUS at 21:22

## 2022-04-08 RX ADMIN — INSULIN LISPRO 3 UNITS: 100 INJECTION, SOLUTION INTRAVENOUS; SUBCUTANEOUS at 17:54

## 2022-04-08 RX ADMIN — MICONAZOLE NITRATE: 20 CREAM TOPICAL at 22:03

## 2022-04-08 RX ADMIN — PREGABALIN 25 MG: 25 CAPSULE ORAL at 09:14

## 2022-04-08 RX ADMIN — Medication 10 ML: at 09:15

## 2022-04-08 RX ADMIN — OXYCODONE AND ACETAMINOPHEN 1 TABLET: 5; 325 TABLET ORAL at 03:07

## 2022-04-08 RX ADMIN — TORSEMIDE 100 MG: 100 TABLET ORAL at 09:14

## 2022-04-08 RX ADMIN — MICONAZOLE NITRATE: 20 CREAM TOPICAL at 09:21

## 2022-04-08 RX ADMIN — SILVER SULFADIAZINE: 10 CREAM TOPICAL at 12:48

## 2022-04-08 RX ADMIN — ENOXAPARIN SODIUM 30 MG: 100 INJECTION SUBCUTANEOUS at 09:14

## 2022-04-08 RX ADMIN — POLYETHYLENE GLYCOL 3350 17 G: 17 POWDER, FOR SOLUTION ORAL at 21:22

## 2022-04-08 RX ADMIN — INSULIN LISPRO 3 UNITS: 100 INJECTION, SOLUTION INTRAVENOUS; SUBCUTANEOUS at 21:22

## 2022-04-08 RX ADMIN — TAMSULOSIN HYDROCHLORIDE 0.4 MG: 0.4 CAPSULE ORAL at 06:12

## 2022-04-08 RX ADMIN — OXYCODONE AND ACETAMINOPHEN 1 TABLET: 5; 325 TABLET ORAL at 09:26

## 2022-04-08 RX ADMIN — OXYCODONE AND ACETAMINOPHEN 1 TABLET: 5; 325 TABLET ORAL at 18:35

## 2022-04-08 RX ADMIN — CARVEDILOL 3.12 MG: 3.12 TABLET, FILM COATED ORAL at 09:14

## 2022-04-08 RX ADMIN — ASPIRIN 81 MG 81 MG: 81 TABLET ORAL at 09:15

## 2022-04-08 RX ADMIN — ISOSORBIDE MONONITRATE 15 MG: 30 TABLET, EXTENDED RELEASE ORAL at 09:14

## 2022-04-08 RX ADMIN — INSULIN LISPRO 10 UNITS: 100 INJECTION, SOLUTION INTRAVENOUS; SUBCUTANEOUS at 17:53

## 2022-04-08 RX ADMIN — Medication 10 ML: at 21:22

## 2022-04-08 RX ADMIN — INSULIN LISPRO 3 UNITS: 100 INJECTION, SOLUTION INTRAVENOUS; SUBCUTANEOUS at 12:46

## 2022-04-08 ASSESSMENT — PAIN SCALES - GENERAL
PAINLEVEL_OUTOF10: 0
PAINLEVEL_OUTOF10: 8
PAINLEVEL_OUTOF10: 0

## 2022-04-08 ASSESSMENT — PAIN DESCRIPTION - ORIENTATION
ORIENTATION: RIGHT;LEFT
ORIENTATION: RIGHT;LEFT

## 2022-04-08 ASSESSMENT — PAIN DESCRIPTION - PAIN TYPE
TYPE: CHRONIC PAIN

## 2022-04-08 ASSESSMENT — PAIN DESCRIPTION - LOCATION
LOCATION: LEG
LOCATION: GENERALIZED;BACK
LOCATION: LEG

## 2022-04-08 NOTE — PROGRESS NOTES
Pt removed hat from toilet and urinated in toilet. Educated pt on the importance of using hat or urinal to accurately measure output. New hat placed in toilet, pt verbalized understanding.

## 2022-04-08 NOTE — PLAN OF CARE
Problem: Metabolic:  Goal: Complications related to the disease process, condition or treatment will be avoided or minimized  Description: Complications related to the disease process, condition or treatment will be avoided or minimized  Outcome: Ongoing   Pt educated on the importance of a carb control diet. Monitoring pt's blood glucose AC/HS. Sliding scale insulin given as ordered according to pt's blood glucose. Will continue to monitor.

## 2022-04-08 NOTE — PROGRESS NOTES
Bedside report given to [facility]. Call light and bedside table within reach. No needs voiced at this time. Bed in lowest position, brakes locked.

## 2022-04-08 NOTE — PLAN OF CARE
Problem: Serum Glucose Level - Abnormal:  Goal: Ability to maintain appropriate glucose levels will improve  Description: Ability to maintain appropriate glucose levels will improve  4/7/2022 2222 by Cassie Pizarro RN  Outcome: Ongoing   Continuing to monitor blood glucose levels and administer ordered insulin as appropriate. Problem: OXYGENATION/RESPIRATORY FUNCTION  Goal: Patient will achieve/maintain normal respiratory rate/effort  Description: Respiratory rate and effort will be within normal limits for the patient  Outcome: Ongoing     Patient's EF (Ejection Fraction) is greater than 40%    Heart Failure Medications:  Diuretics[de-identified] Torsemide    (One of the following REQUIRED for EF </= 40%/SYSTOLIC FAILURE but MAY be used in EF% >40%/DIASTOLIC FAILURE)        ACE[de-identified] None        ARB[de-identified] None         ARNI[de-identified] None    (Beta Blockers)  NON- Evidenced Based Beta Blocker (for EF% >40%/DIASTOLIC FAILURE): None    Evidenced Based Beta Blocker::(REQUIRED for EF% <40%/SYSTOLIC FAILURE) Carvedilol- Coreg  . .................................................................................................................................................. Patient's weights and intake/output reviewed: Yes    Patient's Last Weight: 287 lbs obtained by standing scale. Difference of 2 lbs less than last documented weight.       Intake/Output Summary (Last 24 hours) at 4/7/2022 2223  Last data filed at 4/7/2022 2220  Gross per 24 hour   Intake 1800 ml   Output 2175 ml   Net -375 ml       Comorbidities Reviewed Yes    Patient has a past medical history of Anemia, Angina, Arthritis, CAD (coronary artery disease), CHF (congestive heart failure) ([facility] [address] CKD (chronic kidney disease) stage 3, GFR 30-59 ml/min ([facility] Clostridium difficile diarrhea, Diabetes mellitus ([facility] [address] Diabetic neuropathy ([facility] Disease of blood and blood forming organ, Dizziness, GERD (gastroesophageal reflux disease), Headache, Hearing loss, Hyperlipidemia, Hypertension, Kidney stone, Refusal of blood transfusions as patient is Sabianism, Sleep apnea, Tinnitus, Venous ulcer ([facility] [address] and Wound, open. >>For CHF and Comorbidity documentation on Education Time and Topics, please see Education Tab    Progressive Mobility Assessment:  What is this patient's Current Level of Mobility?: Ambulatory- with Assistance  How was this patient Mobilized today?: Edge of Bed, Up to Chair,  Up to Toilet/Shower, and Up in Room, ambulated 300 ft                 With Whom? Nurse and PCA                 Level of Difficulty/Assistance: 1x Assist     Pt resting in bed at this time on  3 L O2. Pt denies shortness of breath. Pt with pitting lower extremity edema.      Patient and/or Family's stated Goal of Care this Admission: reduce shortness of breath, increase activity tolerance, better understand heart failure and disease management, be more comfortable, and reduce lower extremity edema prior to discharge        :

## 2022-04-08 NOTE — PLAN OF CARE
Problem: HEMODYNAMIC STATUS  Goal: Patient has stable vital signs and fluid balance  Outcome: Ongoing     Patient's EF (Ejection Fraction) is greater than 40%    Heart Failure Medications:  Diuretics[de-identified] Torsemide    (One of the following REQUIRED for EF </= 40%/SYSTOLIC FAILURE but MAY be used in EF% >40%/DIASTOLIC FAILURE)        ACE[de-identified] None        ARB[de-identified] None         ARNI[de-identified] None    (Beta Blockers)  NON- Evidenced Based Beta Blocker (for EF% >40%/DIASTOLIC FAILURE): None    Evidenced Based Beta Blocker::(REQUIRED for EF% <40%/SYSTOLIC FAILURE) Carvedilol- Coreg  . .................................................................................................................................................. Patient's weights and intake/output reviewed: Yes    Patient's Last Weight: 287 lbs obtained by standing scale. Difference of 0 lbs more/less than last documented weight. Intake/Output Summary (Last 24 hours) at 4/8/2022 7418  Last data filed at 4/8/2022 2428  Gross per 24 hour   Intake 1800 ml   Output 1300 ml   Net 500 ml       Comorbidities Reviewed Yes    Patient has a past medical history of Anemia, Angina, Arthritis, CAD (coronary artery disease), CHF (congestive heart failure) ([facility] [address] CKD (chronic kidney disease) stage 3, GFR 30-59 ml/min ([facility]), Clostridium difficile diarrhea, Diabetes mellitus ([facility] [address] Diabetic neuropathy ([facility] Disease of blood and blood forming organ, Dizziness, GERD (gastroesophageal reflux disease), Headache, Hearing loss, Hyperlipidemia, Hypertension, Kidney stone, Refusal of blood transfusions as patient is Latter-day, Sleep apnea, Tinnitus, Venous ulcer ([address] and Wound, open.      >>For CHF and Comorbidity documentation on Education Time and Topics, please see Education Tab    Progressive Mobility Assessment:  What is this patient's Current Level of Mobility?: Ambulatory- with Assistance  How was this patient Mobilized today?: Edge of Bed, Up to Chair,  Up to Toilet/Shower, and Up in Room, ambulated 15 ft                 With Whom? Nurse, PCA, PT, and OT                 Level of Difficulty/Assistance: 1x Assist     Pt resting in bed at this time on  3 L O2. Pt with complaints of shortness of breath. Pt with pitting lower extremity edema.      Patient and/or Family's stated Goal of Care this Admission: reduce shortness of breath, increase activity tolerance, better understand heart failure and disease management, be more comfortable, and reduce lower extremity edema prior to discharge        :

## 2022-04-08 NOTE — PROGRESS NOTES
End of shift report given to [facility]. Call light within reach, bed in lowest position, no needs at this time.

## 2022-04-08 NOTE — CARE COORDINATION
Pt now has recs to go home w/HHC vs SNF. Pt prefers to go home and would like to resume care w/[facility] and [facility] for non skilled services. CM discussed concerns regarding multiple readmissions and pt stated he did not want to burden his family. An ACP and POA conversation was had w/pt and he stated he wanted his  called; Pt has POA paperwork confirming this decision. CM will continue to follow for needs, including discussing diabetes and CHF management programs through his [facility] service at d/c. Pt has transport home and is active w/[facility] for his home O2. Pt is on the waiting list for the [place] [id number].    Lizzy Hernandez RN

## 2022-04-08 NOTE — TELEPHONE ENCOUNTER
Monitor company [employer]  Length of monitor 14 days  Monitor ordered by Dr. Kevin Maya number Lisandra Bade tech to registered and  Luis Cm RN applied at the time of discharge.

## 2022-04-08 NOTE — PROGRESS NOTES
Mike Epperson RN with Cardiology called regarding 2 week monitor. Mike Epperson RN stated [facility] could deliver it tomorrow morning for it to be activated at time of discharge.

## 2022-04-08 NOTE — ACP (ADVANCE CARE PLANNING)
Advance Care Planning     General Advance Care Planning (ACP) Conversation    Date of Conversation: 3/31/2022  Conducted with: Patient with Decision Making Capacity    Healthcare Decision Maker:    Primary Decision Maker: Ifrah [facility] - [phone]  Click here to complete [address] including selection of the Healthcare Decision Maker Relationship (ie \"Primary\"). Today we documented Decision Maker(s) consistent with ACP documents on file. Content/Action Overview: Has ACP document(s) on file - reflects the patient's care preferences  Reviewed DNR/DNI and patient elects Full Code (Attempt Resuscitation)  end of life care preferences (vegetative state/imminent death)  Pt states he does not want to be placed on machines in order to sustain life. Pt states that we are to contact his  for all decisions regarding his medical decision should he not be able to make those decisions on his own.     Length of Voluntary ACP Conversation in minutes:  <16 minutes (Non-Billable)    Debora Dumont RN

## 2022-04-08 NOTE — PROGRESS NOTES
Hospitalist Progress Note  4/8/2022 11:48 AM  Subjective:   Admit Date: 3/31/2022  PCP: Karel Wheeler CNP Status:  Inpatient  Interval History: Hospital Day: 9, admitted with acute hypoxic respiratory failure (10 LPM oxygen on baseline 4 LPM) secondary to diastolic heart failure and refractory to increased oral diuretic prior to admission on furosemide gtt exacerbated by acute kidney injury on CKD stage 3a and type 2 diabetes with episodic hypoglycemia on insulin. Epistaxis from nasal cannula oxygen, left nostril, similar to previous episode requiring cauterization with Silver nitrate. Subjective: long d/w pt today regarding DM mgmt, risk of bleeding with anticoagulation in light of pAF on tele and importance of adhering to diet and medications. Regarding DM- again he endorses a very confusing home regimen that he in a way created himself due to the worry of having low BG after eating yet worried about high BG so would inject himself as needed insulin. I think he would benefit greatly from an endocrinologist if his DM continues to be difficult to manage. Otherwise im hopeful that with our new simpler regimen his pcp should be able to manage. Plan is to inject 32-35 units of lantus with meal time 10 units. Check BG three times daily and keep a diary. He will check fasting in the morning and then before lunch and dinner. He will bring this to his doctor. hopefully he can be placed on CGM. Regarding pAF, this is new. We discussed risk and benefits of ac. He denies risk of bleeding or falls. Though he is very slow to move with a walker. Never been on AC. He is open to taking apixaban and following very closely with his PCP and cardiology. We discussed strict return precautions. He endorsed dyspnea on exertion and we discussed the possibility of this being related to afib.  We will place him on a cardiac monitor at [place] with EP follow up    He had diarrhea this morning but that has since resolved. Diet: controlled carbohydrate (75 gm/meal)  Medications:   Furosemide at 5 mg/hr  linaclotide  290 mcg Oral QAM AC   insulin lispro  10 Units SubCUTAneous TID WC   tamsulosin  0.4 mg Oral Daily   aspirin  81 mg Oral Daily   atorvastatin  80 mg Oral Nightly   carvedilol  6.25 mg Oral BID WC   ferrous sulfate  325 mg Oral TID WC   insulin glargine  80 Units SubCUTAneous BID   isosorbide mononitrate  30 mg Oral Daily   oxycodone acetaminophen  1 tablet Oral 4x Daily   pregabalin  25 mg Oral BID   enoxaparin  30 mg SubCUTAneous BID   insulin lispro SSI   10 + 0-18 Units SubCUTAneous TID      No results for input(s): WBC, HGB, PLT, MCV in the last 72 hours. Recent Labs     04/06/22  0710 04/07/22  0615 04/08/22  0805    142 135*   K 3.9 3.9 4.0   CL 95* 96* 93*   CO2 34* 34* 33*   BUN 64* 57* 60*   CREATININE 1.6* 1.4* 1.4*   GLUCOSE 134* 162* 237*     No results for input(s): AST, ALT, ALB, BILITOT, ALKPHOS in the last 72 hours. Troponin T:   No results for input(s): TROPONINI in the last 72 hours. INR (3/31) 1.29  proBNP (3/21) 2,358 pg/mL  Magnesium (4/2) 3.00 mg/dL  Fe / TIBC (4/1) 42 / 268 = 16% iron saturation      POC Glucose:   Recent Labs     04/07/22  1133 04/07/22  1739 04/07/22  1934 04/08/22  0830   POCGLU 211* 198* 212* 230*     TTE (1/13/22)  Limited only f/u for LVEF and aortic valve stenosis. Technically difficult examination.  Low normal left ventricular systolic function with ejection fraction of 50%. Moderate aortic stenosis. Aortic stenosis values appear approximately same when compared to echo on 8-. Mild to moderate aortic regurgitation. Objective:   Vitals:  BP (!) 156/74   Pulse 70   Temp 98.1 °F (36.7 °C) (Oral)   Resp 20   Ht 5' 9\" (1.753 m)   Wt 287 lb 3.2 oz (130.3 kg)   SpO2 92%   BMI 42.41 kg/m²   General appearance: alert and cooperative with exam  Lungs: 3L NC.  Bilateral crackles on exam but improving  Heart: rrr 2/6 systolic murmur   Abdomen: soft, non-tender; bowel sounds normal; no masses,  no organomegaly  Extremities: 2+pitting edema bilaterally with chronic venous insufficiency bilaterally, Left leg with weeping wound  Neurologic: No obvious focal neurologic deficits. Assessment and Plan:     Acute hypoxic respiratory failure initially on 10 LPM and now down to baseline LPM with diuresis. Improving. Asked nursing to wean oxygen to >90% with least amount of oxygen. Will monitor overnight on PO medications and hopefully dc in next 24 hours    Diastolic heart failure on furosemide gtt. Continue carvedilol 6.25 mg BID, maintain SBP > 100. ACE/ARB contraindicated by MARGARITO/CKD. Acute kidney injury on CKD stage 3a. Improving. (baseline 1.5-1.7). Followed by nephrology. CAD on aspirin, statin, and beta blocker. Prior CABG 2010, C in 2012 with patent grafts, mixed ischemia and scar on stress test March 2022 - medical management. Elevated troponin likely supply / demand mismatch. No ischemic evaluation at this time per cardiology. Iron deficiency anemia in setting of heart failure:  Venofer 200 mg IV daily x 3 days. He received 3 doses of IV Venofer in August 2021. He has received IV iron infusions in the past. NO blood products. No history of GIB    Type 2 Diabetes (controlled, HgbA1c 7.0%) with episodes of hypoglycemia. Cover with a \"sliding scale\" lispro moderate scale prandial correction insulin. Complicated by episodes of hypoglycemia. Adjusted basal insulin  to wt based dosing. Improving. Will need outpt follow up. Will need to check three times daily BG at home. Dyslipidemia (LDL 65) on atorvastatin 80 mg nightly. Opioid induced constipation on linaclotide. HENNA baseline. Class III obesity (BMI 62/0) complicates medical management. Epistaxis- likely related to dry air from continuous O2. Resolving. Trial afrin x 3 days    pAF- no hisotry of GIB or life threatening bleed.  Long d/w pt regarding risk and benefits of AC. He will follow up with cardiology 2 weeks post dc. Very strict return precautions provided. hI d/w cardiology starting him on [facility] and they feel no absolute CI present, simply a relative one if he is not compliant with medication. CHADSVASc >2. HAS-Bled score is also high. We discussed this with the pt and he is willing to start apixaban BID and cardiac monitor at dc. EP referral made. Addenda: I followed up with the pt and he actually changed his mind regarding AC due to the risk of bleeding being a [place] witness. He prefers to be seen by EP outpt with cardiac monitor data and d/w them the risk and benefits of bleeding and stroke. Noted temp of 100.2  in chart but pt without focal or systemic signs of infection.  Monitor clinically    Advance Directive: Full Code  DVT prophylaxis- enoxaparin  Discharge plannin/09     Kingsley Hawkins MD  [facility] Hospitalist

## 2022-04-08 NOTE — PROGRESS NOTES
[facility]   Daily Cardiovascular Progress Note    Admit Date: 3/31/2022    CC:Shortness of Breath (medics report shortness of breath, pt on home oxygen 4 liters, pt placed on non rebreather. pt wheezing. pt given douneb. 10 liters  bs 268) and Leg Problem (pt seeing wound care for left leg wheeping. pt reports this was tuesday. )      HPI: Coco Gray has no new complaints today. He is feeling morning aware of managing daily weights at home and dietary. PAF noted on monitor per nursing - patient without symptoms.       Medications/Labs all Reviewed:  Patient Active Problem List   Diagnosis    DM (diabetes mellitus) ([address]    Coronary artery disease involving native coronary artery of native heart without angina pectoris    Anemia of chronic disease    Essential hypertension    Hyperkalemia    Chronic diastolic heart failure ([address]    Pulmonary hypertension due to left ventricular diastolic dysfunction ([address]    Morbid obesity ([address]    S/P CABG x 3    DM2 (diabetes mellitus, type 2) ([address]    Morbid obesity with BMI of 50.0-59.9, adult ([address]    HLD (hyperlipidemia)    Cardiomyopathy ([address]    Productive cough    Chronic pain    Severe obstructive sleep apnea    Obesity, Class III, BMI 40-49.9 (morbid obesity) (HCC)    Acute diastolic congestive heart failure (HCC)    Degeneration of lumbar or lumbosacral intervertebral disc    Displacement of lumbar intervertebral disc without myelopathy    Lumbosacral spondylosis without myelopathy    Lumbar facet arthropathy    Disc displacement, lumbar    Spinal stenosis of lumbar region    Mixed conductive and sensorineural hearing loss of left ear with restricted hearing of right ear    Tympanic membrane perforation, left    Chest pain    Cor pulmonale, chronic (HCC)    Pulmonary hypertension due to alveolar hypoventilation disorder (HCC)    Nonrheumatic aortic valve stenosis    Chronic neck pain    Dyspnea    Acute diastolic CHF (congestive heart failure) (HCC)    Cervical stenosis of spinal canal    Degeneration of cervical intervertebral disc    Cervical radiculitis    Acute on chronic diastolic CHF (congestive heart failure) (HCC)    Acute respiratory failure with hypoxia (HCC)    Acute on chronic renal insufficiency       Medications:   insulin glargine  32 Units SubCUTAneous QAM    torsemide  100 mg Oral Daily    polyethylene glycol  17 g Oral BID    isosorbide mononitrate  15 mg Oral Daily    carvedilol  3.125 mg Oral BID WC    linaclotide  290 mcg Oral QAM AC    insulin lispro  10 Units SubCUTAneous TID WC    tamsulosin  0.4 mg Oral Daily    miconazole   Topical BID    aspirin  81 mg Oral Daily    atorvastatin  80 mg Oral Nightly    silver sulfADIAZINE   Topical Daily    pregabalin  25 mg Oral BID    sodium chloride flush  5-40 mL IntraVENous 2 times per day    enoxaparin  30 mg SubCUTAneous BID    insulin lispro  0-18 Units SubCUTAneous TID WC    insulin lispro  0-9 Units SubCUTAneous Nightly       Infusion Medications:   sodium chloride      dextrose         Labs:  Lab Results   Component Value Date    WBC 5.4 04/03/2022    HGB 10.9 (L) 04/03/2022    HCT 33.9 (L) 04/03/2022    MCV 88.2 04/03/2022     04/03/2022     Lab Results   Component Value Date    CREATININE 1.4 (H) 04/08/2022    BUN 60 (H) 04/08/2022     (L) 04/08/2022    K 4.0 04/08/2022    CL 93 (L) 04/08/2022    CO2 33 (H) 04/08/2022     Lab Results   Component Value Date    INR 1.29 (H) 03/31/2022    PROTIME 14.7 (H) 03/31/2022        Physical Examination:    BP (!) 156/74   Pulse 70   Temp 98.1 °F (36.7 °C) (Oral)   Resp 20   Ht 5' 9\" (1.753 m)   Wt 287 lb 3.2 oz (130.3 kg)   SpO2 92%   BMI 42.41 kg/m²    Wt Readings from Last 3 Encounters:   04/08/22 287 lb 3.2 oz (130.3 kg)   03/14/22 282 lb 9.6 oz (128.2 kg)   02/10/22 280 lb (127 kg)       Intake/Output Summary (Last 24 hours) at 4/8/2022 0846  Last data filed at 4/8/2022 0814  Gross per 24 hour   Intake 1800 ml   Output 1300 ml   Net 500 ml       Respiratory:  · Resp Assessment: Reduced respiratory effort   · Resp Auscultation: mild rales to auscultation bilaterally   Cardiovascular:  · Auscultation: regular rhythm and normal rate, normal S1S2, no murmur, rub or gallop  · Palpation:  Nl PMI  · JVP:  elevated  · Extremities:++Edema  Abdomen:  · Soft, non-tender  · Normal bowel sounds  Extremities:  ·  No Cyanosis or Clubbing  Neurological/Psychiatric:  · Oriented to time, place, and person  · Fatigued appearing  Skin Warm and dry    Assessment:    Acute on chronic diastolic CHF (congestive heart failure) (HCC)  Acute on chronic renal insufficiency  PAF     Plan:  1. Continue diuresis for net negative fluid balance daily   ~lasix being managed by nephrology  2. Salt and fluid restriction as necessary - reinforced verbally today. 3. Advanced heart failure team to manage as OP  4. No AC due to anemia and bleeding risk.   ~will need to see EP as OP for formal consultation and risk assessment. All questions and concerns were addressed to the patient/family. Alternatives to my treatment were discussed. The note was completed using EMR. Every effort was made to ensure accuracy; however, inadvertent computerized transcription errors may be present.     Nelly Vela MD, ALEXANDRU, [facility], [facility] [place] [address]  4/8/2022 8:46 AM

## 2022-04-08 NOTE — PROGRESS NOTES
Occupational Therapy  Facility/Department: [facility] A2 CARD TELEMETRY  Daily Treatment Note  NAME: Xavier Blair  : 1955  MRN: [id number]    Date of Service: 2022    Discharge Recommendations:  Subacute/Skilled Nursing Facility   Barriers to home discharge:   [x] Reported available assist at home upon discharge limited   [x] Patient requests DC to other than home            Assessment   Performance deficits / Impairments: Decreased functional mobility ; Decreased safe awareness;Decreased endurance;Decreased ADL status; Decreased high-level IADLs  Assessment: pt walking to/from bathroom without calling for assist to carry items, spilling water & decreased vision for spills; pt with poor endurance standing to complete UE grooming/ADL's, continues to benefit from OT services  OT Education: OT Role;Transfer Training;Equipment;Plan of Care;ADL Adaptive Strategies; Low Vision Education;Precautions  Patient Education: disease specific: importance of use or RED/nurse call light for assist with ADL & mobility needs for safety, due to low vision,  Barriers to Learning: impulsive, decreased vision/safety awareness  REQUIRES OT FOLLOW UP: Yes  Activity Tolerance  Activity Tolerance: Patient limited by pain; Patient limited by fatigue  Safety Devices  Safety Devices in place: Yes  Type of devices: Call light within reach; Left in bed;Nurse notified (per nursing pt refusing bed alarms)         Patient Diagnosis(es): The primary encounter diagnosis was Acute on chronic congestive heart failure, unspecified heart failure type ([address] Diagnoses of Acute respiratory failure with hypoxia ([address] Acute on chronic renal insufficiency, and PAF (paroxysmal atrial fibrillation) ([address] were also pertinent to this visit.       has a past medical history of Anemia, Angina, Arthritis, CAD (coronary artery disease), CHF (congestive heart failure) ([address] CKD (chronic kidney disease) stage 3, GFR 30-59 ml/min ([facility]), Clostridium difficile diarrhea, Diabetes mellitus ([facility] Diabetic neuropathy ([address] Disease of blood and blood forming organ, Dizziness, GERD (gastroesophageal reflux disease), Headache, Hearing loss, Hyperlipidemia, Hypertension, Kidney stone, Refusal of blood transfusions as patient is [employer], Sleep apnea, Tinnitus, Venous ulcer ([address] and Wound, open. has a past surgical history that includes hernia repair (age2); Cholecystectomy (9/2011); other surgical history (11-16-11 REPAIR LOWER STERNAL INCISION, REMOVAL OF ONE STERNAL WIRE,); Upper gastrointestinal endoscopy; Cardiac surgery; other surgical history (10/10/2014); and Pain management procedure (N/A, 2/10/2022). Restrictions  Restrictions/Precautions  Restrictions/Precautions: Up as Tolerated,General Precautions,Fall Risk  Position Activity Restriction  Other position/activity restrictions: up as tolerated, 3 L O2, wounds BLE  Subjective   General  Chart Reviewed: Yes  Patient assessed for rehabilitation services?: Yes  Additional Pertinent Hx: arthritis, CAD, CHf, DM, neuropathy, anemia, CKd, hearing loss, CABG x3, hernia repair, GARY, venous ulcer, obesity. Family / Caregiver Present: No  Diagnosis: Pt presents with SOB. Pt admitted with acute on chronic diastolic heart failure, moderate aortic stenosis, elevated troponin, MARGARITO, acute hypoxic respiratory failure. Subjective  Subjective: pt seated EOB upon OT arrival. Pt reports that he is feeling better than yesterday. Agreeable to OT.  Pt reporting frustration with not being able to get out of bed without A  General Comment  Comments: RN cleared pt for OT; pt sitting EOB shaving, large water spill on floor between bathroom & patients bed  Pain Assessment  Pain Assessment: 0-10  Pain Level: 8  Pain Type: Chronic pain  Pain Location: Leg  Pain Orientation: Right;Left  Non-Pharmaceutical Pain Intervention(s): Therapeutic presence;Repositioned  Pre Treatment Pain Screening  Intervention List: Patient able to continue with treatment  Vital Signs  Patient Currently in Pain: Yes   Orientation  Orientation  Overall Orientation Status: Within Functional Limits  Objective    ADL  LE Dressing: Maximum assistance (to starla non-skid socks, refused sock aid(\"I have one at home, you put them on for me here\"))        Balance  Sitting Balance: Independent  Functional Mobility  Functional Mobility Comments: from spill on floor pt needs assist, (unable to stand long enough to shave & spills carrying things to bed side with RW)  Bed mobility  Supine to Sit: Unable to assess (already sitting EOB)  Sit to Supine: Modified independent  Transfers  Sit to stand: Independent  Stand to sit: Independent      Cognition  Overall Cognitive Status: Exceptions (impulsive)  Arousal/Alertness: Appropriate responses to stimuli  Following Commands:  Follows one step commands consistently  Safety Judgement: Decreased awareness of need for safety;Decreased awareness of need for assistance  Insights: Decreased awareness of deficits  Initiation: Does not require cues       Plan   Plan  Times per week: 3-5x/week  Current Treatment Recommendations: Strengthening,Endurance Training,Patient/Caregiver Education & Training,Equipment Evaluation, Education, & procurement,Self-Care / ADL,Balance Training,Home Management Training,Pain Management,Functional Mobility Training,Safety Education & Training,Positioning    AM-PAC Score        AM-[facility] Inpatient Daily Activity Raw Score: 17 (04/08/22 1155)  AM-PAC Inpatient ADL T-Scale Score : 37.26 (04/08/22 1155)  ADL Inpatient CMS 0-100% Score: 50.11 (04/08/22 1155)  ADL Inpatient CMS G-Code Modifier : CK (04/08/22 1155)    Goals  Short term goals  Time Frame for Short term goals: 2 weeks (4/19/22)  Short term goal 1: Mod I functional tranfers to/from EOB, chair, toilet; - GOAL MET  Short term goal 2: SBA LB dressing with AE as needed; 4/08 max assist with LE dressing(refusing to wait for sock aid)  Short term goal 3: Mod I toileting;   Short term goal 4: Mod I grooming in stance at sink; 4/08 poor endurance for standing, seated EOB to shave       Therapy Time   Individual Concurrent Group Co-treatment   Time In 0958         Time Out 1015         Minutes [id number] [place], [place]

## 2022-04-08 NOTE — PROGRESS NOTES
Pt requested a bucket of warm water to soak his feet in, RN explained to pt that since he has minimal feeling in his feet and has open wounds that it would not be safe to soak his feet. Pt verbalized understanding. However, RN entered pt's bathroom and found urine collection hat sitting on the floor next to toilet. Explained to pt that he needs to urinate in the hat in the toilet or into a urinal to measure output. Pt states he removed the hat because \"he needed to soak his feet and get moisture into his feet somehow\". RN replaced hat in toilet and attempted to explain to pt that this is not safe, pt states \"I know, I know, but I had to do something. \" Pt continues to adamantly refuse bed alarm. Will monitor.

## 2022-04-08 NOTE — PROGRESS NOTES
[facility] Progress Note           Subjective/   75y.o. year old male who we are seeing in consultation for MARGARITO/CKD-3. HPI:  The patient was seen and examined; he feels well today with no CP, SOB, nausea or vomiting. ROS: No fever or chills. Social: No family at bedside. Objective/   GEN:  Chronically ill, /61   Pulse 67   Temp 97.9 °F (36.6 °C) (Oral)   Resp 18   Ht 5' 9\" (1.753 m)   Wt 287 lb 3.2 oz (130.3 kg)   SpO2 92%   BMI 42.41 kg/m²      HEENT: non-icteric, no JVD  CV: S1, S2 without m/r/g; ++ LE edema  RESP: CTA B without w/r/r; breathing wnl  ABD: +bs, soft, nt, no hsm  SKIN: warm, no rashes    Data/  No results for input(s): WBC, HGB, HCT, MCV, PLT in the last 72 hours.   Recent Labs     04/06/22  0710 04/07/22  0615 04/08/22  0805    142 135*   K 3.9 3.9 4.0   CL 95* 96* 93*   CO2 34* 34* 33*   GLUCOSE 134* 162* 237*   BUN 64* 57* 60*   CREATININE 1.6* 1.4* 1.4*   LABGLOM 43* 50* 50*   GFRAA 52* >60 >60       Assessment/     - Acute on chronic kidney disease stage 3              Worsening in setting of CHF decompensation, hx of volatile fluctuations in levels   based on volume status & diuretic use              Background DM Nephropathy + Cardiorenal syndrome              Baseline SCr mid 1 range, 1.5-1.7              Follows with Dr. Olivia Vargas in office   Improving with diuresis                - dCHF exacerbation     - DM2 - poorly controlled     - Normocytic anemia, chronic - Hb at target     - Quaker    Plan/     - We will continue daily Torsemide  - Trend labs, weights, bp's, & urine output

## 2022-04-09 VITALS
HEART RATE: 64 BPM | OXYGEN SATURATION: 93 % | HEIGHT: 69 IN | TEMPERATURE: 97.9 F | WEIGHT: 287.6 LBS | RESPIRATION RATE: 16 BRPM | BODY MASS INDEX: 42.6 KG/M2 | DIASTOLIC BLOOD PRESSURE: 70 MMHG | SYSTOLIC BLOOD PRESSURE: 125 MMHG

## 2022-04-09 LAB
ANION GAP SERPL CALCULATED.3IONS-SCNC: 10 MMOL/L (ref 3–16)
BUN BLDV-MCNC: 63 MG/DL (ref 7–20)
CALCIUM SERPL-MCNC: 9.7 MG/DL (ref 8.3–10.6)
CHLORIDE BLD-SCNC: 95 MMOL/L (ref 99–110)
CO2: 30 MMOL/L (ref 21–32)
CREAT SERPL-MCNC: 1.3 MG/DL (ref 0.8–1.3)
EKG ATRIAL RATE: 277 BPM
EKG DIAGNOSIS: NORMAL
EKG P AXIS: 104 DEGREES
EKG Q-T INTERVAL: 396 MS
EKG QRS DURATION: 98 MS
EKG QTC CALCULATION (BAZETT): 408 MS
EKG R AXIS: -30 DEGREES
EKG T AXIS: 109 DEGREES
EKG VENTRICULAR RATE: 64 BPM
GFR AFRICAN AMERICAN: >60
GFR NON-AFRICAN AMERICAN: 55
GLUCOSE BLD-MCNC: 205 MG/DL (ref 70–99)
GLUCOSE BLD-MCNC: 210 MG/DL (ref 70–99)
GLUCOSE BLD-MCNC: 225 MG/DL (ref 70–99)
MAGNESIUM: 2.4 MG/DL (ref 1.8–2.4)
PERFORMED ON: ABNORMAL
PERFORMED ON: ABNORMAL
POTASSIUM SERPL-SCNC: 4 MMOL/L (ref 3.5–5.1)
SODIUM BLD-SCNC: 135 MMOL/L (ref 136–145)

## 2022-04-09 PROCEDURE — 36415 COLL VENOUS BLD VENIPUNCTURE: CPT

## 2022-04-09 PROCEDURE — 6370000000 HC RX 637 (ALT 250 FOR IP): Performed by: STUDENT IN AN ORGANIZED HEALTH CARE EDUCATION/TRAINING PROGRAM

## 2022-04-09 PROCEDURE — 99233 SBSQ HOSP IP/OBS HIGH 50: CPT | Performed by: NURSE PRACTITIONER

## 2022-04-09 PROCEDURE — 6370000000 HC RX 637 (ALT 250 FOR IP): Performed by: NURSE PRACTITIONER

## 2022-04-09 PROCEDURE — 83735 ASSAY OF MAGNESIUM: CPT

## 2022-04-09 PROCEDURE — 6360000002 HC RX W HCPCS: Performed by: NURSE PRACTITIONER

## 2022-04-09 PROCEDURE — 6370000000 HC RX 637 (ALT 250 FOR IP): Performed by: INTERNAL MEDICINE

## 2022-04-09 PROCEDURE — 93010 ELECTROCARDIOGRAM REPORT: CPT | Performed by: INTERNAL MEDICINE

## 2022-04-09 PROCEDURE — 93005 ELECTROCARDIOGRAM TRACING: CPT | Performed by: NURSE PRACTITIONER

## 2022-04-09 PROCEDURE — 2580000003 HC RX 258: Performed by: NURSE PRACTITIONER

## 2022-04-09 PROCEDURE — 94761 N-INVAS EAR/PLS OXIMETRY MLT: CPT

## 2022-04-09 PROCEDURE — 2700000000 HC OXYGEN THERAPY PER DAY

## 2022-04-09 PROCEDURE — 80048 BASIC METABOLIC PNL TOTAL CA: CPT

## 2022-04-09 RX ORDER — POLYETHYLENE GLYCOL 3350 17 G/17G
17 POWDER, FOR SOLUTION ORAL 2 TIMES DAILY
Qty: 527 G | Refills: 1 | Status: SHIPPED | OUTPATIENT
Start: 2022-04-09 | End: 2022-05-09

## 2022-04-09 RX ORDER — CARVEDILOL 3.12 MG/1
3.12 TABLET ORAL 2 TIMES DAILY WITH MEALS
Qty: 60 TABLET | Refills: 3 | Status: SHIPPED | OUTPATIENT
Start: 2022-04-09

## 2022-04-09 RX ORDER — INSULIN GLARGINE 100 [IU]/ML
35 INJECTION, SOLUTION SUBCUTANEOUS DAILY
Qty: 5 PEN | Refills: 3 | Status: ON HOLD
Start: 2022-04-09 | End: 2022-10-23 | Stop reason: CLARIF

## 2022-04-09 RX ORDER — ISOSORBIDE MONONITRATE 30 MG/1
15 TABLET, EXTENDED RELEASE ORAL DAILY
Qty: 30 TABLET | Refills: 3 | Status: SHIPPED | OUTPATIENT
Start: 2022-04-10 | End: 2022-05-26

## 2022-04-09 RX ORDER — TORSEMIDE 100 MG/1
100 TABLET ORAL DAILY
Qty: 30 TABLET | Refills: 3 | Status: SHIPPED | OUTPATIENT
Start: 2022-04-10 | End: 2022-08-23 | Stop reason: DRUGHIGH

## 2022-04-09 RX ORDER — LINACLOTIDE 290 UG/1
290 CAPSULE, GELATIN COATED ORAL
Qty: 30 CAPSULE | Refills: 1 | Status: SHIPPED | OUTPATIENT
Start: 2022-04-10

## 2022-04-09 RX ADMIN — OXYCODONE AND ACETAMINOPHEN 1 TABLET: 5; 325 TABLET ORAL at 08:43

## 2022-04-09 RX ADMIN — TAMSULOSIN HYDROCHLORIDE 0.4 MG: 0.4 CAPSULE ORAL at 05:52

## 2022-04-09 RX ADMIN — OXYCODONE AND ACETAMINOPHEN 1 TABLET: 5; 325 TABLET ORAL at 14:44

## 2022-04-09 RX ADMIN — ASPIRIN 81 MG 81 MG: 81 TABLET ORAL at 08:36

## 2022-04-09 RX ADMIN — ACETAMINOPHEN 650 MG: 325 TABLET ORAL at 05:52

## 2022-04-09 RX ADMIN — INSULIN LISPRO 6 UNITS: 100 INJECTION, SOLUTION INTRAVENOUS; SUBCUTANEOUS at 11:38

## 2022-04-09 RX ADMIN — SILVER SULFADIAZINE: 10 CREAM TOPICAL at 08:37

## 2022-04-09 RX ADMIN — ISOSORBIDE MONONITRATE 15 MG: 30 TABLET, EXTENDED RELEASE ORAL at 08:36

## 2022-04-09 RX ADMIN — ENOXAPARIN SODIUM 30 MG: 100 INJECTION SUBCUTANEOUS at 08:36

## 2022-04-09 RX ADMIN — INSULIN LISPRO 6 UNITS: 100 INJECTION, SOLUTION INTRAVENOUS; SUBCUTANEOUS at 08:44

## 2022-04-09 RX ADMIN — PREGABALIN 25 MG: 25 CAPSULE ORAL at 08:36

## 2022-04-09 RX ADMIN — OXYCODONE AND ACETAMINOPHEN 1 TABLET: 5; 325 TABLET ORAL at 01:18

## 2022-04-09 RX ADMIN — CARVEDILOL 3.12 MG: 3.12 TABLET, FILM COATED ORAL at 08:36

## 2022-04-09 RX ADMIN — TORSEMIDE 100 MG: 100 TABLET ORAL at 08:36

## 2022-04-09 RX ADMIN — MICONAZOLE NITRATE: 20 CREAM TOPICAL at 08:43

## 2022-04-09 RX ADMIN — INSULIN LISPRO 10 UNITS: 100 INJECTION, SOLUTION INTRAVENOUS; SUBCUTANEOUS at 08:44

## 2022-04-09 RX ADMIN — INSULIN LISPRO 10 UNITS: 100 INJECTION, SOLUTION INTRAVENOUS; SUBCUTANEOUS at 11:37

## 2022-04-09 RX ADMIN — Medication 10 ML: at 08:36

## 2022-04-09 RX ADMIN — INSULIN GLARGINE 35 UNITS: 100 INJECTION, SOLUTION SUBCUTANEOUS at 08:43

## 2022-04-09 ASSESSMENT — PAIN SCALES - GENERAL
PAINLEVEL_OUTOF10: 8
PAINLEVEL_OUTOF10: 0
PAINLEVEL_OUTOF10: 8
PAINLEVEL_OUTOF10: 6
PAINLEVEL_OUTOF10: 9

## 2022-04-09 ASSESSMENT — PAIN DESCRIPTION - PAIN TYPE: TYPE: CHRONIC PAIN

## 2022-04-09 NOTE — DISCHARGE SUMMARY
Hospital Medicine Discharge Summary    Patient ID: Cari Ricks      Patient's PCP: Claudina Osler, APRN - CNP    Admit Date: 3/31/2022     Discharge Date: 4/9/2022      Admitting Provider: Lan De La Rosa MD     Discharge Provider: Zaria Patiño MD     Discharge Diagnoses: Active Hospital Problems    Diagnosis     PAF (paroxysmal atrial fibrillation) ([facility] [I48.0]     Acute on chronic renal insufficiency [N28.9, N18.9]     Acute respiratory failure with hypoxia ([facility]) [J96.01]     Acute on chronic diastolic CHF (congestive heart failure) ([facility]) [I50.33]     Obesity, Class III, BMI 40-49.9 (morbid obesity) ([facility] [E66.01]     HLD (hyperlipidemia) [E78.5]     Essential hypertension [I10]     Coronary artery disease involving native coronary artery of native heart without angina pectoris [I25.10]     DM (diabetes mellitus) ([facility] [E11.9]        The patient was seen and examined on day of discharge and this discharge summary is in conjunction with any daily progress note from day of discharge. Hospital Course: 07ZJO admitted with acute hypoxic respiratory failure (10 LPM oxygen on baseline 4 LPM) secondary to diastolic heart failure and refractory to increased oral diuretic prior to admission on furosemide gtt exacerbated by acute kidney injury on CKD stage 3a and type 2 diabetes with episodic hypoglycemia on insulin. Epistaxis from nasal cannula oxygen, left nostril, similar to previous episode requiring cauterization with Silver nitrate. Problem based hospital course and same day physical exam below:    Acute hypoxic respiratory failure initially on 10 LPM and now down to baseline LPM with diuresis. Improving. Asked nursing to wean oxygen to >90% with least amount of oxygen. Tolerated PO medications.     Diastolic heart failure on torsemide qd Continue carvedilol BID, maintain SBP > 100. ACE/ARB contraindicated by MARGARITO/CKD.       Acute kidney injury on CKD stage 3a. Improving. (baseline 1.5-1.7). Followed by nephrology.     CAD on aspirin, statin, and beta blocker. Prior CABG 2010, C in 2012 with patent grafts, mixed ischemia and scar on stress test March 2022 - medical management. Elevated troponin likely supply / demand mismatch. No ischemic evaluation at this time per cardiology.      Iron deficiency anemia in setting of heart failure:  Venofer 200 mg IV daily x 3 days. He received 3 doses of IV Venofer in August 2021. He has received IV iron infusions in the past. NO blood products. No history of GIB     Type 2 Diabetes (controlled, HgbA1c 7.0%) with episodes of hypoglycemia. Cover with a \"sliding scale\" lispro moderate scale prandial correction insulin. Complicated by episodes of hypoglycemia. Adjusted basal insulin  to wt based dosing. Improving. Will need outpt follow up. Will need to check three times daily BG at home. Long d/w pt regarding DM education. He has lantus and novolog at home. Plan:  - lantus 35 units daily (pt has pens at home)  - novolog 10 units TID before meals (pt has pens at home)  - check BG fasting in morning and before lunch and dinner. Will not adjust insulin based on reads but rather show his PCP in 1-2 weeks his readings at home  - if BG <100 instructed pt to eat and then recheck. If improving and asymptomatic and eating can continue regimen. If it does not improve he was instructed to notify healthcare  - Educated pt on hypoglycemia sxs, he understands.     Dyslipidemia (LDL 65) on atorvastatin 80 mg nightly.      Opioid induced constipation on linaclotide.      HENNA baseline.     Class III obesity (BMI 72/3) complicates medical management.      Epistaxis- likely related to dry air from continuous O2. Resolving. Trial afrin x 3 days     pAF- no history of GIB or life threatening bleed. Long d/w pt regarding risk and benefits of AC. He will follow up with cardiology 2 weeks post dc. Very strict return precautions provided.  Cardiac device monitor placed. CHADSVASc >2. HAS-Bled score is also high. D/t concerns regarding bleeding and not being markus to accept blood products he rather continue the discussion w/ EP at dc versus starting anticoagulation now. I made a referral to EP.     Noted temp of 100.2 04/05 in chart but pt without focal or systemic signs of infection. Monitor clinically         Physical Exam Performed:     /70   Pulse 64   Temp 97.9 °F (36.6 °C) (Oral)   Resp 16   Ht 5' 9\" (1.753 m)   Wt 287 lb 9.6 oz (130.5 kg)   SpO2 93%   BMI 42.47 kg/m²        General appearance: alert and cooperative with exam  Lungs: 3L NC. Improved aeration bilaterally minimal crackles  Heart: rrr 2/6 systolic murmur   Abdomen: soft, non-tender; bowel sounds normal; no masses,  no organomegaly  Extremities: 1+pitting edema bilaterally with chronic venous insufficiency bilaterally, Left leg with weeping wound  Neurologic: No obvious focal neurologic deficits. Labs: For convenience and continuity at follow-up the following most recent labs are provided:      CBC:    Lab Results   Component Value Date    WBC 5.4 04/03/2022    HGB 10.9 04/03/2022    HCT 33.9 04/03/2022     04/03/2022       Renal:    Lab Results   Component Value Date     04/09/2022    K 4.0 04/09/2022    K 3.4 03/31/2022    CL 95 04/09/2022    CO2 30 04/09/2022    BUN 63 04/09/2022    CREATININE 1.3 04/09/2022    CALCIUM 9.7 04/09/2022    PHOS 3.8 12/24/2019         Significant Diagnostic Studies    Radiology:   XR CHEST PORTABLE   Final Result   1. Nonspecific pulmonary opacities in the bilateral lungs can be seen with   congestive heart failure on a background of smoking or atypical/viral   pneumonia. Similar appearance to prior studies. 2. Calcific atherosclerosis aorta. 3. Cardiomegaly.                 Consults:     IP CONSULT TO NEPHROLOGY  IP CONSULT TO HEART FAILURE NURSE/COORDINATOR  IP CONSULT TO DIETITIAN  IP CONSULT TO CARDIOLOGY  IP CONSULT TO HOME CARE NEEDS    Disposition:  Home with Rohit Aguayo     Condition at Discharge: Stable    Discharge Instructions/Follow-up:  Cardiology and EP as well as PCP to discuss BG    Code Status:  Full Code     Activity: activity as tolerated    Diet: diabetic diet      Discharge Medications:     Discharge Medication List as of 4/9/2022  4:43 PM           Details   insulin glargine (LANTUS SOLOSTAR) 100 UNIT/ML injection pen Inject 35 Units into the skin daily, Disp-5 pen, R-3NO PRINT      insulin lispro (HUMALOG) 100 UNIT/ML injection vial Inject 10 Units into the skin 3 times daily (with meals), Disp-10 mL, R-0NO PRINT      polyethylene glycol (GLYCOLAX) 17 g packet Take 17 g by mouth 2 times daily, Disp-527 g, R-1Normal              Details   isosorbide mononitrate (IMDUR) 30 MG extended release tablet Take 0.5 tablets by mouth daily, Disp-30 tablet, R-3Normal      carvedilol (COREG) 3.125 MG tablet Take 1 tablet by mouth 2 times daily (with meals), Disp-60 tablet, R-3Normal      torsemide (DEMADEX) 100 MG tablet Take 1 tablet by mouth daily, Disp-30 tablet, R-3Normal      linaclotide (LINZESS) 290 MCG CAPS capsule Take 1 capsule by mouth every morning (before breakfast), Disp-30 capsule, R-1Normal              Details   tamsulosin (FLOMAX) 0.4 MG capsule Take 0.4 mg by mouth DailyHistorical Med      oxyCODONE-acetaminophen (PERCOCET) 5-325 MG per tablet Take 1 tablet by mouth 4 times daily for 30 days. , Disp-120 tablet, R-0Normal      atorvastatin (LIPITOR) 80 MG tablet Take 1 tablet by mouth nightly, Disp-30 tablet, R-3Normal      pregabalin (LYRICA) 25 MG capsule Take 1 capsule by mouth 2 times daily for 1 day., Disp-2 capsule, R-0NO PRINT      allopurinol (ZYLOPRIM) 300 MG tablet Take 1 tablet by mouth daily, Disp-30 tablet, R-0NO PRINT      naloxone 4 MG/0.1ML LIQD nasal spray 1 spray by Nasal route as needed for Opioid Reversal, Disp-1 each, R-0Normal      acetaminophen (TYLENOL) 500 MG tablet Take 2 tablets by mouth 4 times daily as needed for Pain, Disp-60 tablet,R-0Print      hydrocortisone (ANUSOL-HC) 2.5 % rectal cream Place rectally 2 times daily. , Disp-1 Tube, R-0, Print      senna (SENOKOT) 8.6 MG TABS tablet Take 1 tablet by mouth 2 times daily, Disp-120 tablet, R-0Print      docusate sodium (COLACE, DULCOLAX) 100 MG CAPS Take 100 mg by mouth 2 times dailyOTC      Compression Stockings MISC Starting 6/15/2016, Until Discontinued, Disp-2 each, R-1, Print2 pair, knee high compression stockings, fitted/ zippered      silver sulfADIAZINE (SILVADENE) 1 % cream Apply to BL lower leg ulcers daily, Disp-20 g, R-0, Print      nitroGLYCERIN (NITROSTAT) 0.4 MG SL tablet Place 1 tablet under the tongue every 5 minutes as needed, Disp-25 tablet, R-1      fluticasone (FLONASE) 50 MCG/ACT nasal spray 1 spray by Nasal route nightly as needed. aspirin 81 MG chewable tablet Take 1 tablet by mouth daily. , Disp-30 tablet      ferrous sulfate 325 (65 FE) MG tablet Take 325 mg by mouth 2 times daily Historical Med      omeprazole (PRILOSEC) 20 MG capsule Take 20 mg by mouth 2 times daily. Time Spent on discharge is more than 45 minutes in the examination, evaluation, counseling and review of medications and discharge plan. Signed:    Kingsley Hawkins MD   4/9/2022      Thank you PATRICK Taylor - GILBERTO for the opportunity to be involved in this patient's care. If you have any questions or concerns please feel free to contact me at [phone].

## 2022-04-09 NOTE — PROGRESS NOTES
Pt discharged per MD. Reviewed AVS with patient including home medications and insulin regime. Peripheral IV Removed and gauze applied. Tele box removed, CMU notified. Event monitor applied, explained instructions to patient and explained to pt how to report symptoms. Also explained to patient how to return box when the 2 weeks is up. Pt taken to car via wheelchair with all belongings.

## 2022-04-09 NOTE — PROGRESS NOTES
[facility], [place], operated by [facility]   Daily Progress Note    Admit Date:  3/31/2022  HPI:    Chief Complaint   Patient presents with    Shortness of Breath     medics report shortness of breath, pt on home oxygen 4 liters, pt placed on non rebreather. pt wheezing. pt given douneb. 10 liters  bs 268    Leg Problem     pt seeing wound care for left leg wheeping. pt reports this was tuesday. Interval history: Kamla Adames is being followed for shortness of breath. Has been treated with lasix infusion this admission. Subjective:  Mr. Solis Cortés edema is stable. No chest pain.      Objective:   /66   Pulse 64   Temp 97.9 °F (36.6 °C) (Oral)   Resp 18   Ht 5' 9\" (1.753 m)   Wt 287 lb 9.6 oz (130.5 kg)   SpO2 90%   BMI 42.47 kg/m²       Intake/Output Summary (Last 24 hours) at 4/9/2022 1130  Last data filed at 4/9/2022 0557  Gross per 24 hour   Intake 545 ml   Output 1150 ml   Net -605 ml       NYHA: IV    Physical Exam:  General:  Awake, alert, NAD  Skin:  Warm and dry  Neck:  JVD ~ 10-12 cm H20   Chest:  Few crackles in the bases to auscultation, no wheezes/rhonchi  Telemetry: SR rate 70's   Cardiovascular:  RRR S1S2, + murmur, no r/g   Abdomen:  Soft, nontender, +bowel sounds  Extremities:  ++ bilateral lower extremity edema, + wrinkling of the BLE skin, + left leg wrapped     Medications:    insulin glargine  35 Units SubCUTAneous QAM    torsemide  100 mg Oral Daily    polyethylene glycol  17 g Oral BID    isosorbide mononitrate  15 mg Oral Daily    carvedilol  3.125 mg Oral BID WC    linaclotide  290 mcg Oral QAM AC    insulin lispro  10 Units SubCUTAneous TID WC    tamsulosin  0.4 mg Oral Daily    miconazole   Topical BID    aspirin  81 mg Oral Daily    atorvastatin  80 mg Oral Nightly    silver sulfADIAZINE   Topical Daily    pregabalin  25 mg Oral BID    sodium chloride flush  5-40 mL IntraVENous 2 times per day    enoxaparin  30 mg SubCUTAneous BID    insulin lispro  0-18 Units SubCUTAneous TID WC    insulin lispro  0-9 Units SubCUTAneous Nightly      sodium chloride      dextrose         Lab Data:  CBC: No results for input(s): WBC, HGB, PLT in the last 72 hours. BMP:    Recent Labs     04/07/22  0615 04/08/22  0805 04/09/22  0811    135* 135*   K 3.9 4.0 4.0   CO2 34* 33* 30   BUN 57* 60* 63*   CREATININE 1.4* 1.4* 1.3     INR:  No results for input(s): INR in the last 72 hours. BNP:    No results for input(s): PROBNP in the last 72 hours. Lab Results   Component Value Date    LVEF 67 03/10/2022       Testing:  Echo 1/13/22  Summary   Limited only f/u for LVEF and aortic valve stenosis. Technically difficult examination. Low normal left ventricular systolic function with ejection fraction of 50%. Moderate aortic stenosis. Aortic stenosis values appear approximately same when compared to echo on   8-. Mild to moderate aortic regurgitation.     04/09/22 0333 287 lb 9.6 oz (130.5 kg) Actual;Standing scale      04/08/22 0306 287 lb 3.2 oz (130.3 kg) Actual;Standing scale     04/07/22 0326 287 lb 1.6 oz (130.2 kg) Actual;Standing scale     04/06/22 05:22:23 289 lb 3.2 oz (131.2 kg) Standing scale     04/05/22 03:28:48 289 lb 8 oz (131.3 kg) Standing scale     04/04/22 03:12:25 291 lb (132 kg) Standing scale     04/03/22 03:26:10 293 lb (132.9 kg) Standing scale     04/02/22 0329 292 lb (132.5 kg) Standing scale     04/01/22 0015 296 lb 8 oz (134.5 kg) Standing scale     Principal Problem:    Acute respiratory failure with hypoxia (HCC)  Active Problems:    DM (diabetes mellitus) ([facility]    Coronary artery disease involving native coronary artery of native heart without angina pectoris    Essential hypertension    HLD (hyperlipidemia)    Obesity, Class III, BMI 40-49.9 (morbid obesity) (HCC)    Acute on chronic diastolic CHF (congestive heart failure) (HCC)    Acute on chronic renal insufficiency    PAF (paroxysmal atrial fibrillation) ([facility]  Resolved Problems:    *

## 2022-04-09 NOTE — DISCHARGE INSTR - COC
Continuity of Care Form    Patient Name: Yunior Martinez   :  1955  MRN:  [id number]    [facility] date:  3/31/2022  Discharge date:  22    Code Status Order: Full Code   Advance Directives:      Admitting Physician:  Jacqueline Armando MD  PCP: PATRICK Cook CNP    Discharging Nurse: Arcelia Lyn Unit/Room#: 0373/3339-68  Discharging Unit Phone Number: [phone]    Emergency Contact:   Extended Emergency Contact Information  Primary Emergency Contact: [address]   [address] Phone: [phone]  Mobile Phone: [phone]  Relation: Other  Secondary Emergency Contact: Lacy [address] Phone: [phone]  Relation: Other    Past Surgical History:  Past Surgical History:   Procedure Laterality Date    CARDIAC SURGERY      Dec 27 2010, triple bypass    CHOLECYSTECTOMY  2011    HERNIA REPAIR  age2    OTHER SURGICAL HISTORY  10 REPAIR LOWER STERNAL INCISION, REMOVAL OF ONE STERNAL WIRE,    OTHER SURGICAL HISTORY  10/10/2014    phacoemulsification of cataract with intraocular lens implant left eye    PAIN MANAGEMENT PROCEDURE N/A 2/10/2022    MIDLINE CERVICAL SIX SEVEN EPIDURAL STEROID INJECTION SITE CONFIRMED BY FLUOROSCOPY performed by Taiwo Johnson MD at [address] ENDOSCOPY         Immunization History:   Immunization History   Administered Date(s) Administered    COVID-19, Pfizer Purple top, DILUTE for use, 12+ yrs, 30mcg/0.3mL dose 2021, 2021, 2021    Influenza Virus Vaccine 2012, 10/02/2017, 10/01/2018, 2019, 2020    Influenza, High Dose (Fluzone 65 yrs and older) 10/01/2019    Influenza, MDCK Quadv, IM, PF (Flucelvax 2 yrs and older) 10/05/2018    Influenza, Quadv, IM, PF (6 mo and older Fluzone, Flulaval, Fluarix, and 3 yrs and older Afluria) 2016, 10/02/2017, 2020    Influenza, Quadv, adjuvanted, 65 yrs +, IM, PF (Fluad) 09/29/2020    Influenza, Triv, inactivated, subunit, adjuvanted, IM (Fluad 65 yrs and older) 09/13/2019    Pneumococcal Conjugate 13-valent (Rosellen Sill) 09/13/2019    Pneumococcal Polysaccharide ([id number]) 01/04/2011, 11/27/2014, 12/17/2020       Active Problems:  Patient Active Problem List   Diagnosis Code    DM (diabetes mellitus) ([facility] E11.9    Coronary artery disease involving native coronary artery of native heart without angina pectoris I25.10    Anemia of chronic disease D63.8    Essential hypertension I10    Hyperkalemia E87.5    Chronic diastolic heart failure ([facility]) I50.32    Pulmonary hypertension due to left ventricular diastolic dysfunction ([facility]) I27.22    Morbid obesity ([facility]) E66.01    S/P CABG x 3 Z95.1    DM2 (diabetes mellitus, type 2) ([facility]) E11.9    Morbid obesity with BMI of 50.0-59.9, adult ([facility] E66.01, Z68.43    HLD (hyperlipidemia) E78.5    Cardiomyopathy ([facility] I42.9    Productive cough R05.8    Chronic pain G89.29    Severe obstructive sleep apnea G47.33    Obesity, Class III, BMI 40-49.9 (morbid obesity) ([facility]) V65.76    Acute diastolic congestive heart failure ([facility]) I50.31    Degeneration of lumbar or lumbosacral intervertebral disc M51.37    Displacement of lumbar intervertebral disc without myelopathy M51.26    Lumbosacral spondylosis without myelopathy M47.817    Lumbar facet arthropathy M47.816    Disc displacement, lumbar M51.26    Spinal stenosis of lumbar region M48.061    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    Tympanic membrane perforation, left H72.92    Chest pain R07.9    Cor pulmonale, chronic ([facility]) I27.81    Pulmonary hypertension due to alveolar hypoventilation disorder ([facility]) I27.23    Nonrheumatic aortic valve stenosis I35.0    Chronic neck pain M54.2, G89.29    Dyspnea F70.07    Acute diastolic CHF (congestive heart failure) ([facility]) I50.31    Cervical stenosis of spinal canal M48.02    Degeneration of cervical intervertebral disc M50.30    Cervical radiculitis M54.12    Acute on chronic diastolic CHF (congestive heart failure) ([facility]) I50.33    Acute respiratory failure with hypoxia ([facility]) J96.01    Acute on chronic renal insufficiency N28.9, N18.9    PAF (paroxysmal atrial fibrillation) ([facility]) I48.0       Isolation/Infection:   Isolation            No Isolation          Patient Infection Status       Infection Onset Added Last Indicated Last Indicated By Review Planned Expiration Resolved Resolved By    None active    Resolved    COVID-19 (Rule Out) 03/07/22 03/07/22 03/07/22 COVID-19, Rapid (Ordered)   03/07/22 Rule-Out Test Resulted    COVID-19 (Rule Out) 01/13/22 01/13/22 01/13/22 SARS-CoV-2 NAAT (Rapid) (Ordered)   01/13/22 Rule-Out Test Resulted    COVID-19 (Rule Out) 08/23/21 08/23/21 08/23/21 COVID-19, Rapid (Ordered)   08/23/21 Rule-Out Test Resulted            Nurse Assessment:  Last Vital Signs: /66   Pulse 64   Temp 97.9 °F (36.6 °C) (Oral)   Resp 18   Ht 5' 9\" (1.753 m)   Wt 287 lb 9.6 oz (130.5 kg)   SpO2 90%   BMI 42.47 kg/m²     Last documented pain score (0-10 scale): Pain Level: 6  Last Weight:   Wt Readings from Last 1 Encounters:   04/09/22 287 lb 9.6 oz (130.5 kg)     Mental Status:  oriented and alert    IV Access:  - None    Nursing Mobility/ADLs:  Walking   Independent  Transfer  Independent  Bathing  Assisted  Dressing  Assisted  Toileting  Assisted  Feeding  Independent  Med Admin  Assisted  Med Delivery   whole    Wound Care Documentation and Therapy:  Wound 01/14/22 Leg Left; Lower; Lateral (Active)   Wound Image   04/04/22 1654   Wound Etiology Venous 04/08/22 1442   Dressing Status Clean;Dry; Intact 04/08/22 1442   Wound Cleansed Irrigated with saline 04/08/22 1442   Dressing/Treatment Pharmaceutical agent (see MAR); Dry dressing;Roll gauze 04/08/22 1442   Offloading for Diabetic Foot Ulcers Offloading ordered 04/04/22 1654   Dressing Change Due 04/09/22 04/08/22 1442   Wound Length (cm) 8 cm 04/04/22 1654   Wound Width (cm) 9 cm 04/04/22 1654   Wound Depth (cm) 0.1 cm 04/04/22 1654   Wound Surface Area (cm^2) 72 cm^2 04/04/22 1654   Change in Wound Size % (l*w) -700 04/04/22 1654   Wound Volume (cm^3) 7.2 cm^3 04/04/22 1654   Wound Healing % -700 04/04/22 1654   Distance Tunneling (cm) 0 cm 04/04/22 1654   Tunneling Position ___ O'Clock 0 04/04/22 1654   Undermining Starts ___ O'Clock 0 04/04/22 1654   Undermining Ends___ O'Clock 0 04/04/22 1654   Undermining Maxium Distance (cm) 0 04/04/22 1654   Wound Assessment Pink/red; Other (Comment) 04/04/22 1654   Drainage Amount Moderate 04/04/22 1654   Drainage Description Serosanguinous; Serous 04/04/22 1654   Odor None 04/04/22 1654   Radha-wound Assessment Edematous; Induration;Blanchable erythema 04/04/22 1654   Margins Attached edges; Defined edges 04/04/22 1654   Wound Thickness Description not for Pressure Injury Partial thickness 04/04/22 1654   Number of days: 84       Wound 01/14/22 Leg Left; Lower; Lateral (Active)   Number of days: 84       Wound 04/04/22 Leg Right; Lower; Inner cluster of 2 (Active)   Wound Image   04/04/22 1655   Wound Etiology Venous 04/08/22 1442   Dressing Status Clean;Dry; Intact 04/08/22 1442   Wound Cleansed Irrigated with saline 04/08/22 1442   Dressing/Treatment Pharmaceutical agent (see MAR); Dry dressing;Roll gauze 04/08/22 1442   Offloading for Diabetic Foot Ulcers Offloading ordered 04/04/22 1655   Dressing Change Due 04/09/22 04/08/22 1442   Wound Length (cm) 5 cm 04/04/22 1655   Wound Width (cm) 7.5 cm 04/04/22 1655   Wound Depth (cm) 0.1 cm 04/04/22 1655   Wound Surface Area (cm^2) 37.5 cm^2 04/04/22 1655   Wound Volume (cm^3) 3.75 cm^3 04/04/22 1655   Distance Tunneling (cm) 0 cm 04/04/22 1655   Tunneling Position ___ O'Clock 0 04/04/22 1655   Undermining Starts ___ O'Clock 0 04/04/22 1655   Undermining Ends___ O'Clock 0 04/04/22 1655   Undermining Maxium Distance (cm) 0 04/04/22 1655   Wound Assessment Pink/red 04/08/22 1442   Drainage Amount Small 04/08/22 1442   Drainage Description Serosanguinous 04/08/22 1442   Odor None 04/08/22 1442   Radha-wound Assessment Hemosiderin staining (brown yellow);Edematous; Blanchable erythema; Induration 04/04/22 1655   Margins Attached edges; Defined edges 04/04/22 1655   Wound Thickness Description not for Pressure Injury Partial thickness 04/04/22 1655   Number of days: 4       Wound 04/04/22 Toe (Comment  which one) Anterior; Left (Active)   Wound Image   04/04/22 1656   Wound Etiology Traumatic 04/08/22 1442   Dressing Status Clean;Dry; Intact 04/08/22 1442   Wound Cleansed Cleansed with saline 04/08/22 1442   Dressing/Treatment Pharmaceutical agent (see MAR); Dry dressing;Roll gauze 04/08/22 1442   Offloading for Diabetic Foot Ulcers Offloading ordered 04/04/22 1656   Dressing Change Due 04/09/22 04/08/22 1442   Wound Length (cm) 2 cm 04/04/22 1656   Wound Width (cm) 1.6 cm 04/04/22 1656   Wound Depth (cm) 0.1 cm 04/04/22 1656   Wound Surface Area (cm^2) 3.2 cm^2 04/04/22 1656   Wound Volume (cm^3) 0.32 cm^3 04/04/22 1656   Distance Tunneling (cm) 0 cm 04/04/22 1656   Tunneling Position ___ O'Clock 0 04/04/22 1656   Undermining Starts ___ O'Clock 0 04/04/22 1656   Undermining Ends___ O'Clock 0 04/04/22 1656   Undermining Maxium Distance (cm) 0 04/04/22 1656   Wound Assessment Pink/red 04/04/22 1656   Drainage Amount Small 04/08/22 1442   Drainage Description Serous 04/08/22 1442   Odor None 04/04/22 1656   Radha-wound Assessment Edematous; Induration; Hemosiderin staining (brown yellow); Blanchable erythema 04/04/22 1656   Margins Attached edges; Defined edges 04/04/22 1656   Wound Thickness Description not for Pressure Injury Partial thickness 04/04/22 1656   Number of days: 4        Elimination:  Continence:    Bowel: Yes  Bladder: Yes  Urinary Catheter: None   Colostomy/Ileostomy/Ileal Conduit: No       Date of Last BM: 04/09/22    Intake/Output Summary (Last 24 hours) at 4/9/2022 2781  Last data filed at 4/9/2022 0557  Gross per 24 hour   Intake 545 ml   Output 1150 ml   Net -605 ml     I/O last 3 completed shifts: In: 4240 [P.O.:1620; I.V.:5]  Out: 1450 [Urine:1450]    Safety Concerns: At Risk for Falls    Impairments/Disabilities:      None    Nutrition Therapy:  Current Nutrition Therapy:   - Oral Diet:  Carb Control 4 carbs/meal (1800kcals/day)    Routes of Feeding: Oral  Liquids: Thin Liquids  Daily Fluid Restriction: yes - amount 1500  Last Modified Barium Swallow with Video (Video Swallowing Test): not done    Treatments at the Time of Hospital Discharge:   Respiratory Treatments:   Oxygen Therapy:  is on oxygen at 3-4 L/min per nasal cannula. Ventilator:    - No ventilator support    Rehab Therapies:    Weight Bearing Status/Restrictions: No weight bearing restrictions  Other Medical Equipment (for information only, NOT a DME order): Other Treatments:      Patient's personal belongings (please select all that are sent with patient):       RN SIGNATURE:  Electronically signed by Rafael Malone RN on 4/9/22 at 4:43 PM EDT    CASE MANAGEMENT/SOCIAL WORK SECTION    Inpatient Status Date: 3/31/22    Readmission Risk Assessment Score:  Readmission Risk              Risk of Unplanned Readmission:  33           Discharging to Facility/ Agency   Name: [facility]  Address:  Phone: [phone]  Fax:    Dialysis Facility (if applicable)   Name:  Address:  Dialysis Schedule:  Phone:  Fax:    / signature: Electronically signed by Cathy Hgaan RN on 4/9/22 at 3:51 PM EDT    PHYSICIAN SECTION    Prognosis: Good    Condition at Discharge: Stable    Rehab Potential (if transferring to Rehab): Good    Recommended Labs or Other Treatments After Discharge: follow up with PCP in 1-2 weeks. Continue to take lantus daily and novolog tid with meals.  Needs cardiology follow up within 2 weeks from dc    Physician Certification: I certify the above information and transfer of Cari Ricks  is necessary for the continuing treatment of the diagnosis listed and that he requires [address] for greater 30 days.      Update Admission H&P: No change in H&P    PHYSICIAN SIGNATURE:  Electronically signed by Zaria Patiño MD on 4/9/22 at 9:51 AM EDT

## 2022-04-09 NOTE — PROGRESS NOTES
Hospitalist Progress Note  4/9/2022 7:29 AM  Subjective:   Admit Date: 3/31/2022  PCP: Selina Boyd CNP Status:  Inpatient  Interval History: Hospital Day: 10, admitted with acute hypoxic respiratory failure (10 LPM oxygen on baseline 4 LPM) secondary to diastolic heart failure and refractory to increased oral diuretic prior to admission on furosemide gtt exacerbated by acute kidney injury on CKD stage 3a and type 2 diabetes with episodic hypoglycemia on insulin. Epistaxis from nasal cannula oxygen, left nostril, similar to previous episode requiring cauterization with Silver nitrate. Subjective:feels well. ROS negative. breathing improving. uop grossly unchanged per pt. Feels encouraged regarding current medication regimen. reviewed his dc med rec at length    24 hours events: no sig event overnight. sBP ~100-111, afebrile, HR 59-68 on 3L NC, wt unchanged at 287 x3 days, UOPP ~1 L with 100 of torsemide, negat 125cc, - 230 with 32 units of insulin 15 additional units of insulin administered with meals. Diet: controlled carbohydrate (75 gm/meal)  Medications:   Furosemide at 5 mg/hr  linaclotide  290 mcg Oral QAM AC   insulin lispro  10 Units SubCUTAneous TID WC   tamsulosin  0.4 mg Oral Daily   aspirin  81 mg Oral Daily   atorvastatin  80 mg Oral Nightly   carvedilol  6.25 mg Oral BID WC   ferrous sulfate  325 mg Oral TID WC   insulin glargine  80 Units SubCUTAneous BID   isosorbide mononitrate  30 mg Oral Daily   oxycodone acetaminophen  1 tablet Oral 4x Daily   pregabalin  25 mg Oral BID   enoxaparin  30 mg SubCUTAneous BID   insulin lispro SSI   10 + 0-18 Units SubCUTAneous TID WC     No results for input(s): WBC, HGB, PLT, MCV in the last 72 hours.   Recent Labs     04/07/22  0615 04/08/22  0805    135*   K 3.9 4.0   CL 96* 93*   CO2 34* 33*   BUN 57* 60*   CREATININE 1.4* 1.4*   GLUCOSE 162* 237*     No results for input(s): AST, ALT, ALB, BILITOT, ALKPHOS in the last 72 hours.  Troponin T:   No results for input(s): TROPONINI in the last 72 hours. INR (3/31) 1.29  proBNP (3/21) 2,358 pg/mL  Magnesium (4/2) 3.00 mg/dL  Fe / TIBC (4/1) 42 / 268 = 16% iron saturation      POC Glucose:   Recent Labs     04/08/22  0830 04/08/22  1234 04/08/22  1740 04/08/22 2019   POCGLU 230* 177* 140* 225*     TTE (1/13/22)  Limited only f/u for LVEF and aortic valve stenosis. Technically difficult examination.  Low normal left ventricular systolic function with ejection fraction of 50%. Moderate aortic stenosis. Aortic stenosis values appear approximately same when compared to echo on 8-. Mild to moderate aortic regurgitation. Objective:   Vitals:  BP (!) 101/56   Pulse 59   Temp 98 °F (36.7 °C) (Oral)   Resp 16   Ht 5' 9\" (1.753 m)   Wt 287 lb 9.6 oz (130.5 kg)   SpO2 91%   BMI 42.47 kg/m²     General appearance: alert and cooperative with exam  Lungs: 3L NC. Improved aeration bilaterally minimal crackles  Heart: rrr 2/6 systolic murmur   Abdomen: soft, non-tender; bowel sounds normal; no masses,  no organomegaly  Extremities: 1+pitting edema bilaterally with chronic venous insufficiency bilaterally, Left leg with weeping wound  Neurologic: No obvious focal neurologic deficits. Assessment and Plan:     Acute hypoxic respiratory failure initially on 10 LPM and now down to baseline LPM with diuresis. Improving. Asked nursing to wean oxygen to >90% with least amount of oxygen. Will monitor overnight on PO medications and hopefully dc today. Diastolic heart failure on torsemide qd Continue carvedilol BID, maintain SBP > 100. ACE/ARB contraindicated by MARGARITO/CKD. Acute kidney injury on CKD stage 3a. Improving. (baseline 1.5-1.7). Followed by nephrology. CAD on aspirin, statin, and beta blocker. Prior CABG 2010, [facility] in 2012 with patent grafts, mixed ischemia and scar on stress test March 2022 - medical management. Elevated troponin likely supply / demand mismatch.  No ischemic evaluation at this time per cardiology. Iron deficiency anemia in setting of heart failure:  Venofer 200 mg IV daily x 3 days. He received 3 doses of IV Venofer in August 2021. He has received IV iron infusions in the past. NO blood products. No history of GIB    Type 2 Diabetes (controlled, HgbA1c 7.0%) with episodes of hypoglycemia. Cover with a \"sliding scale\" lispro moderate scale prandial correction insulin. Complicated by episodes of hypoglycemia. Adjusted basal insulin  to wt based dosing. Improving. Will need outpt follow up. Will need to check three times daily BG at home. Long d/w pt regarding DM education. He has lantus and novolog at home. Plan:  - lantus 35 units daily  - novolog 10 units TID before meals  - check BG fasting in morning and before lunch and dinner. Will not adjust insulin based on reads but rather show his PCP in 1-2 weeks his readings at home  - if BG <100 instructed pt to eat and then recheck. If improving and asymptomatic and eating can continue regimen. If it does not improve he was instructed to notify healthcare  - Educated pt on hypoglycemia sxs, he understands. Dyslipidemia (LDL 65) on atorvastatin 80 mg nightly. Opioid induced constipation on linaclotide. HENNA baseline. Class III obesity (BMI 61/8) complicates medical management. Epistaxis- likely related to dry air from continuous O2. Resolving. Trial afrin x 3 days    pAF- no hisotry of GIB or life threatening bleed. Long d/w pt regarding risk and benefits of AC. He will follow up with cardiology 2 weeks post dc. Very strict return precautions provided. Cardiac device monitor placed. CHADSVASc >2. HAS-Bled score is also high. D/t concerns regarding bleeding and not being markus to accept blood products he rather continue the discussion w/ EP at dc versus starting anticoagulation now. I made a referral to EP.     Noted temp of 100.2 04/05 in chart but pt without focal or systemic signs of infection.  Monitor clinically    Advance Directive: Full Code  DVT prophylaxis- enoxaparin  Discharge plannin/09     Zaria Patiño MD  [facility]

## 2022-04-09 NOTE — CARE COORDINATION
CASE MANAGEMENT DISCHARGE SUMMARY      Discharge to: Home with [facility]    Transportation:    Family/car: yes     Confirmed discharge plan with:     Patient: yes     Family:  No per pt     Facility/Agency, name:  [facility] [address] [place] [address] faxed     RN, name: Amelia Linares    Confirmed with pt that he is discharging home and has transportation/o2 tank. Note: Discharging nurse to complete ARASH, reconcile AVS, and place final copy with patient's discharge packet. RN to ensure that written prescriptions for  Level II medications are sent with patient to the facility as per protocol.

## 2022-04-09 NOTE — PLAN OF CARE
Problem: Falls - Risk of:  Goal: Will remain free from falls  Description: Will remain free from falls  Outcome: Ongoing   Pt will remain free from falls throughout hospital stay. Fall precautions in place, bed alarm on, bed in lowest position with wheels locked and side rails 2/4 up. Room door open and hourly rounding completed. Will continue to monitor throughout shift. Problem: Skin Integrity:  Goal: Will show no infection signs and symptoms  Description: Will show no infection signs and symptoms  Outcome: Ongoing   Pt is at risk for skin breakdown. Pt will have skin assessments every shift, Q2 turns, heels elevated off of the bed, and friction and shear prevented when possible. Will continue to monitor for signs of skin breakdown and enforce prevention measures. Problem: Pain:  Goal: Pain level will decrease  Description: Pain level will decrease  Outcome: Ongoing   Pt will be satisfied with pain control. Pt uses numeric pain rating scale with reassessments after pain med administration. Will continue to monitor progression throughout shift. Problem: Serum Glucose Level - Abnormal:  Goal: Ability to maintain appropriate glucose levels will improve  Description: Ability to maintain appropriate glucose levels will improve  Outcome: Ongoing   Patient is able to self administer insulin when asked. Patient administered 3 units of insulin at night for the RN under supervision.      Problem: HEMODYNAMIC STATUS  Goal: Patient has stable vital signs and fluid balance  Outcome: Ongoing     Patient's EF (Ejection Fraction) is greater than 40%    Heart Failure Medications:  Diuretics[de-identified] Torsemide    (One of the following REQUIRED for EF </= 40%/SYSTOLIC FAILURE but MAY be used in EF% >40%/DIASTOLIC FAILURE)        ACE[de-identified] None        ARB[de-identified] None         ARNI[de-identified] None    (Beta Blockers)  NON- Evidenced Based Beta Blocker (for EF% >40%/DIASTOLIC FAILURE): None    Evidenced Based Beta Blocker::(REQUIRED for EF% <40%/SYSTOLIC FAILURE) Carvedilol- Coreg  . .................................................................................................................................................. Patient's weights and intake/output reviewed: Yes    Patient's Last Weight: 287 lbs obtained by standing scale. Difference of 0 lbs than last documented weight. Intake/Output Summary (Last 24 hours) at 4/9/2022 0019  Last data filed at 4/8/2022 2354  Gross per 24 hour   Intake 1265 ml   Output 1050 ml   Net 215 ml       Comorbidities Reviewed Yes    Patient has a past medical history of Anemia, Angina, Arthritis, CAD (coronary artery disease), CHF (congestive heart failure) ([address] CKD (chronic kidney disease) stage 3, GFR 30-59 ml/min ([facility]), Clostridium difficile diarrhea, Diabetes mellitus ([address] Diabetic neuropathy ([address] Disease of blood and blood forming organ, Dizziness, GERD (gastroesophageal reflux disease), Headache, Hearing loss, Hyperlipidemia, Hypertension, Kidney stone, Refusal of blood transfusions as patient is [employer]ism, Sleep apnea, Tinnitus, Venous ulcer ([address] and Wound, open. >>For CHF and Comorbidity documentation on Education Time and Topics, please see Education Tab    Progressive Mobility Assessment:  What is this patient's Current Level of Mobility?: Ambulatory- with Assistance  How was this patient Mobilized today?: Edge of Bed, Up to Chair, and  Up to Toilet/Shower, ambulated 30 ft                 With Whom? Nurse and Self                 Level of Difficulty/Assistance: 1x Assist     Pt resting in bed at this time on  3 L O2. Pt denies shortness of breath. Pt with pitting lower extremity edema.      Patient and/or Family's stated Goal of Care this Admission: better understand heart failure and disease management prior to discharge        :

## 2022-04-09 NOTE — PROGRESS NOTES
[facility] Progress Note           Subjective/   75y.o. year old male who we are seeing in consultation for MARGARITO/CKD-3. HPI:  The patient was seen and examined; he was resting comfortably with no acute events noted overnight. ROS: No fever or chills. Social: No family at bedside. Objective/   GEN:  Chronically ill, /66   Pulse 64   Temp 97.9 °F (36.6 °C) (Oral)   Resp 18   Ht 5' 9\" (1.753 m)   Wt 287 lb 9.6 oz (130.5 kg)   SpO2 90%   BMI 42.47 kg/m²      HEENT: non-icteric, no JVD  CV: S1, S2 without m/r/g; ++ LE edema  RESP: CTA B without w/r/r; breathing wnl  ABD: +bs, soft, nt, no hsm  SKIN: warm, no rashes    Data/  No results for input(s): WBC, HGB, HCT, MCV, PLT in the last 72 hours.   Recent Labs     04/07/22  0615 04/08/22  0805 04/09/22  0811    135* 135*   K 3.9 4.0 4.0   CL 96* 93* 95*   CO2 34* 33* 30   GLUCOSE 162* 237* 205*   MG  --   --  2.40   BUN 57* 60* 63*   CREATININE 1.4* 1.4* 1.3   LABGLOM 50* 50* 55*   GFRAA >60 >60 >60       Assessment/     - Acute on chronic kidney disease stage 3              Worsening in setting of CHF decompensation, hx of volatile fluctuations in levels   based on volume status & diuretic use              Background DM Nephropathy + Cardiorenal syndrome              Baseline SCr mid 1 range, 1.5-1.7              Follows with Dr. Karla Baez in office   Improving with diuresis                - dCHF exacerbation     - DM2 - poorly controlled     - Normocytic anemia, chronic - Hb at target     - Yarsanism    Plan/     - We will continue daily Torsemide  - Trend labs, weights, bp's, & urine output

## 2022-04-11 ENCOUNTER — FOLLOWUP TELEPHONE ENCOUNTER (OUTPATIENT)
Dept: TELEMETRY | Age: 67
End: 2022-04-11

## 2022-04-11 NOTE — TELEPHONE ENCOUNTER
Spoke to patient for hospital follow up. Pt states that he has been doing ok but still gets winded and is stating that he is not ready for SNF but states that he feels like he can not manage much longer on his own. States he was so tired this morning he turned away [facility] but realizes that that was a mistake. Writer discussed possibility of SNF with transition to [place]. Pt declines stating he does not want SNF that he would rather stay where he is. Writer offered to reach out Marlon Kelly to add a  to his care. Pt also asked that writer ask RN to come tomorrow. Spoke to Harish with [facility] who will address all of the above.  Chely Montenegro RN

## 2022-04-12 ENCOUNTER — TELEPHONE (OUTPATIENT)
Dept: PULMONOLOGY | Age: 67
End: 2022-04-12

## 2022-04-12 NOTE — TELEPHONE ENCOUNTER
Patient did not show for 6 week follow up after BIPAP titration  appointment  with Yunior Osorio CNP on 4/12/22    Same Day Cancellation: No    Patient rescheduled:  No    New appointment:     Patient was also no show on: 11/24/21  LOV   Assessment: 12/10/21      · Severe HENNA. BIPAP 14/8 cm H2O. patient not tolerating BiPAP, states he is using O2 at night currently  · Hypersomnia- sedating medications, poor sleep hygiene, co morbid conditions, likely contributing-improving. · Irregular sleep pattern due to pain  · Poor sleep hygiene  · Obesity   · Cardiomyopathy, CAD s/p CABG x3, pulmonary HTN, neuropathy, DM, HTN  · Chronic pain- followed by pain management.     Plan:       -BiPAP titration as patient is not tolerating BiPAP and to evaluate for O2 needs if any  -Discussed severity of sleep apnea and importance of BiPAP use  -Supply order to DME of patient's choice  -Chin strap if not using full face mask  - Advised to use BiPAP 6-8 hrs at night and during naps-continue to increase use. - Replacement of mask, tubing, head straps every 3-6 months or sooner if damaged. - Patient instructed to contact DME company for any mask, tubing or machine trouble shooting if problems arise.  - Sleep hygiene  -Continue to work with pain management for chronic pain  -Cognitive behavioral therapy was discussed with patient including stimulus control and sleep restriction   -Recommend set bed/wake times, no naps during the daytime. Use BiPAP when sleeping.  - Avoid sedatives, alcohol and caffeinated drinks at bed time. - Patient counseled to never drive or operate heavy machinery while fatigue, drowsy or sleepy- patient verbalized understanding and agrees.    - Weight loss is recommended as a long-term intervention.    - Complications of HENNA if not treated were discussed with patient patient, including: systemic hypertension, pulmonary hypertension, cardiovascular morbidities, car accidents and all cause mortality.  -Patient education regarding sleep tips and PAP cleaning recommendations      Shay Timoteo Restrepo is a 78 y.o. male evaluated via telephone on 12/10/2021.

## 2022-04-15 NOTE — TELEPHONE ENCOUNTER
Spoke to pt and he said he would discuss with todd at his appt. Stated he couldn't give us his word on completing due to his circumstances.

## 2022-04-20 ENCOUNTER — TELEPHONE (OUTPATIENT)
Dept: SLEEP MEDICINE | Age: 67
End: 2022-04-20

## 2022-04-20 ENCOUNTER — TELEMEDICINE (OUTPATIENT)
Dept: SLEEP MEDICINE | Age: 67
End: 2022-04-20
Payer: MEDICARE

## 2022-04-20 DIAGNOSIS — I48.0 PAF (PAROXYSMAL ATRIAL FIBRILLATION) (HCC): ICD-10-CM

## 2022-04-20 DIAGNOSIS — R53.83 OTHER FATIGUE: ICD-10-CM

## 2022-04-20 DIAGNOSIS — F51.04 PSYCHOPHYSIOLOGICAL INSOMNIA: ICD-10-CM

## 2022-04-20 DIAGNOSIS — G47.33 SEVERE OBSTRUCTIVE SLEEP APNEA: Primary | ICD-10-CM

## 2022-04-20 DIAGNOSIS — G47.10 HYPERSOMNIA: ICD-10-CM

## 2022-04-20 DIAGNOSIS — I50.31 ACUTE DIASTOLIC CHF (CONGESTIVE HEART FAILURE) (HCC): ICD-10-CM

## 2022-04-20 DIAGNOSIS — Z72.821 POOR SLEEP HYGIENE: ICD-10-CM

## 2022-04-20 DIAGNOSIS — Z78.9 DIFFICULTY WITH BIPAP USE: ICD-10-CM

## 2022-04-20 DIAGNOSIS — E66.01 OBESITY, CLASS III, BMI 40-49.9 (MORBID OBESITY) (HCC): ICD-10-CM

## 2022-04-20 PROCEDURE — 99443 PR PHYS/QHP TELEPHONE EVALUATION 21-30 MIN: CPT | Performed by: NURSE PRACTITIONER

## 2022-04-20 ASSESSMENT — SLEEP AND FATIGUE QUESTIONNAIRES
HOW LIKELY ARE YOU TO NOD OFF OR FALL ASLEEP WHEN YOU ARE A PASSENGER IN A CAR FOR AN HOUR WITHOUT A BREAK: 3
HOW LIKELY ARE YOU TO NOD OFF OR FALL ASLEEP WHILE SITTING QUIETLY AFTER LUNCH WITHOUT ALCOHOL: 3
HOW LIKELY ARE YOU TO NOD OFF OR FALL ASLEEP WHILE SITTING INACTIVE IN A PUBLIC PLACE: 3
HOW LIKELY ARE YOU TO NOD OFF OR FALL ASLEEP WHILE SITTING AND READING: 3
ESS TOTAL SCORE: 23
HOW LIKELY ARE YOU TO NOD OFF OR FALL ASLEEP WHILE WATCHING TV: 3
HOW LIKELY ARE YOU TO NOD OFF OR FALL ASLEEP IN A CAR, WHILE STOPPED FOR A FEW MINUTES IN TRAFFIC: 3
HOW LIKELY ARE YOU TO NOD OFF OR FALL ASLEEP WHILE SITTING AND TALKING TO SOMEONE: 2
HOW LIKELY ARE YOU TO NOD OFF OR FALL ASLEEP WHILE LYING DOWN TO REST IN THE AFTERNOON WHEN CIRCUMSTANCES PERMIT: 3

## 2022-04-20 NOTE — TELEPHONE ENCOUNTER
Within this Telehealth Consent, the terms you and yours refer to the person using the Telehealth Service (Service), or in the case of a use of the Service by or on behalf of a minor, you and yours refer to and include (i) the parent or legal guardian who provides consent to the use of the Service by such minor or uses the Service on behalf of such minor, and (ii) the minor for whom consent is being provided or on whose behalf the Service is being utilized. When using Service, you will be consulting with your health care providers via the use of Telehealth.   Telehealth involves the delivery of healthcare services using electronic communications, information technology or other means between a healthcare provider and a patient who are not in the same physical location. Telehealth may be used for diagnosis, treatment, follow-up and/or patient education, and may include, but is not limited to, one or more of the following:    Electronic transmission of medical records, photo images, personal health information or other data between a patient and a healthcare provider    Interactions between a patient and healthcare provider via audio, video and/or data communications    Use of output data from medical devices, sound and video files    Anticipated Benefits   The use of Telehealth by your Provider(s) through the Service may have the following possible benefits:    Making it easier and more efficient for you to access medical care and treatment for the conditions treated by such Provider(s) utilizing the Service    Allowing you to obtain medical care and treatment by Provider(s) at times that are convenient for you    Enabling you to interact with Provider(s) without the necessity of an in-office appointment     Possible Risks   While the use of Telehealth can provide potential benefits for you, there are also potential risks associated with the use of Telehealth.  These risks include, but may not be limited to the following:    Your Provider(s) may not able to provide medical treatment for your particular condition and you may be required to seek alternative healthcare or emergency care services.  The electronic systems or other security protocols or safeguards used in the Service could fail, causing a breach of privacy of your medical or other information.  Given regulatory requirements in certain jurisdictions, your Provider(s) diagnosis and/or treatment options, especially pertaining to certain prescriptions, may be limited. Acceptance   1. You understand that Services will be provided via Telehealth. This process involves the use of HIPAA compliant and secure, real-time audio-visual interfacing with a qualified and appropriately trained provider located at [facility]. 2. You understand that, under no circumstances, will this session be recorded. 3. You understand that the Provider(s) at [facility] and other clinical participants will be party to the information obtained during the Telehealth session in accordance with best medical practices. 4. You understand that the information obtained during the Telehealth session will be used to help determine the most appropriate treatment options. 5. You understand that You have the right to revoke this consent at any point in time. 6. You understand that Telehealth is voluntary, and that continued treatment is not dependent upon consent. 7. You understand that, in the event of non-consent to Telehealth services and/or technical difficulties, you will obtain services as typically provided in the absence of Telehealth technology. 8. You understand that this consent will be kept in Your medical record. 9. No potential benefits from the use of Telehealth or specific results can be guaranteed. Your condition may not be cured or improved and, in some cases, may get worse.    10. There are limitations in the provision of medical care and treatment via Telehealth and the Service and you may not be able to receive diagnosis and/or treatment through the Service for every condition for which you seek diagnosis and/or treatment. 11. There are potential risks to the use of Telehealth, including but not limited to the risks described in this Telehealth Consent. 12. Your Provider(s) have discussed the use of Telehealth and the Service with you, including the benefits and risks of such and you have provided oral consent to your Provider(s) for the use of Telehealth and the Service. 15. You understand that it is your duty to provide your Provider(s) truthful, accurate and complete information, including all relevant information regarding care that you may have received or may be receiving from other healthcare providers outside of the Service. 14. You understand that each of your Provider(s) may determine in his or sole discretion that your condition is not suitable for diagnosis and/or treatment using the Service, and that you may need to seek medical care and treatment a specialist or other healthcare provider, outside of the Service. 15. You understand that you are fully responsible for payment for all services provided by Provider(s) or through use of the Service and that you may not be able to use third-party insurance. 16. You represent that (a) you have read this Telehealth Consent carefully, (b) you understand the risks and benefits of the Service and the use of Telehealth in the medical care and treatment provided to you by Provider(s) using the Service, and (c) you have the legal capacity and authority to provide this consent for yourself and/or the minor for which you are consenting under applicable federal and state laws, including laws relating to the age of [de-identified] and/or parental/guardian consent.    17. You give your informed consent to the use of Telehealth by Provider(s) using the Service under the terms described in the Terms of Service and this Telehealth Consent. The patient was read the following statement and has consented to the visit as of 4/20/22. The patient has been scheduled for their first telehealth visit on 4.20.22 with Ivone Elise.

## 2022-04-20 NOTE — PROGRESS NOTES
Patient ID: Lady Villarreal is a 75 y.o. male who is being seen today for   Chief Complaint   Patient presents with    Sleep Apnea     discuss HENNA/completing BiPAP titration         HPI:     Lady Villarreal is a 75 y.o. male for telephone only virtual visit for HENNA follow up. Titration was ordered at last visit due to patient not tolerating BiPAP and to evaluate need for O2 with BiPAP at night however patient did not complete. States he has been in and out the hospital.       States he has not been using his BIPAP. States he has been using O2 continuous. States he has difficulty using BIPAP.     +daytime sleepiness. No driving. +fatigue. Bedtime and wake times vary. Sleep onset is few minutes. Sometimes naps during the day. States he has a lot of pain. No headache in am. No weight gain. ESS is 23        Previous HPI 12/10/22  Lady Villarreal is a 75 y.o. male for televideo appointment via video and audio doxy. me virtual visit for HENNA follow up. States he was in the hospital and he can home with continuous oxygen. States he is not using BIPAP states he is using O2. States he is not tolerating the BIPAP.     +daytime sleepiness. No driving. +fatigue. Bedtime and wake time varies due to pain, states he sleeps in recliner. Occasional naps during the day. No headache in am. No weight gain. 0-1 caffienated beverages during the day. ESS is 15. Previous 5/27/21  Shay Gaspar is a 75 y.o. male for televideo appointment via video and audio doxy. me virtual visit for HENNA follow up. States he is doing okay with BIPAP. States his circumstances are difficult with his medical conditions and he tries to use BIPAP as much as he can. States he goes from bedroom to chair to sleep due to pain. Patient is using BiPAP   2-5 hrs/night. Using humidifier. No snoring on BiPAP. The pressure is well tolerated. The mask is comfortable-nasal pillows. No mask leak. Some daytime sleepiness. No driving.  +fatigue.  Bedtime and wakes times vary due to pain, cannot sleep more than a few hours at time due to the pain. STates is going to have MRI in a few weeks. Sleep onset is 5-10 minutes. Wakes up 3-4 times at night total.  STates has to sleep in increments due to pain. 3-4 nocturia. It takes few-unknown minutes to fall back a sleep. Multiple naps during the day. No headache in am. No weight gain. 0-1 caffienated beverages during the day. ESS is 12      Previous HPI 4/13/21  Faith Roman is a 75 y.o. male for telephone only virtual visit for HENNA follow up. He received new BIPAP after last visit. States he got new BIPAP. States he is doing well with it. Patient is using BiPAP   3-6 hrs/night. Using humidifier. No snoring on BiPAP. The pressure is well tolerated. The mask is comfortable- nasal pillows. No mask leak. Some daytime sleepiness. No driving. Some fatigue. Bedtimes and wakes times vary due to pain. Sleep onset is 5-10 minutes. Wakes up 3-4 times at night total.  STates has to sleep in increments due to pain. 3-4 nocturia. It takes few- unknown minutes to fall back a sleep. Multiple naps during the day. No headache specifically in am.  No weight gain. 0-1 caffienated beverages during the day. No alcohol. ESS is 11      Previous HPI 2/1/21  Faith Roman is a 75 y.o. male for telephone only virtual visit for HENNA follow up. States his BIPAP stopped working about 1 month ago (he does have a loaner currently from [employer]). States on/off button would not work. States he sent it away to be repaied. He states he was told the blower went out and was told it would be about $240 to fix it. States he is unable to pay that much money for the repair. He is requesting new BIPAP. He has had current BIPAP about 3.5 years. States he cannot sleep without BIPAP. He is using and benefiting from BIPAP. Patient is using BiPAP 4-6 hrs/night. Using humidifier. No snoring on BiPAP. The pressure is well tolerated.  The mask is comfortable- nasal pillows. No mask leak. Some daytime sleepiness, medications, poor sleep hygiene. No driving. +fatigue. Bedtimes and wakes times vary due to pain. Sleep onset is 5-10 minutes. Wakes up 3-4 times at night total.  STates has to sleep in increments due to pain. 3-4 nocturia. It takes few- unknown minutes to fall back a sleep. Multiple naps during the day. Rare headache in am. No weight gain. 1-2 caffienated beverages during the day. No alcohol. ESS is 10. Previous HPI 4/8/20  Kit Brantley is a 75 y.o. male for telephone only virtual visit for HENNA follow up. States he is doing better with BIPAP. Patient is using BiPAP 3-5 hrs/night. Using humidifier. No snoring on BiPAP. The pressure is well tolerated. The mask is comfortable-nasal pillows. No mask leak. States he might want to change DME. Some daytime sleepiness- pain meds, chronic conditions contributing. No nodding off when driving. No dry nose or throat.  +fatigue. Bedtime is variable. States does not keep a set schedule. States pain pain can affect his sleep, can only sleep 3-4 hours at a time due to pain. Sleep onset is few minutes. Wakes up 3 times at night total. 3 nocturia. It takes few- unknown minutes to fall back a sleep. Few naps during the day. No headache in am. No weight gain. No caffienated beverages during the day. No alcohol. ESS is 9- discussed with patient. Previous HPI 9/3/20  Kit Brantley is a 75 y.o. male in office for HENNA follow up. States he is doing okay with BIPAP. States he has to switch BIPAP from bed to living room due to sleep schedule is broken due to pain. States he is going to get an injection this week and hopes that will help the pain. States he cannot stay in bed for more than 3 hours due to pain, then goes to chair in living room (states chair will not fit in bedroom). Patient is using BiPAP 2-5 hrs/night. Using humidifier. No snoring on BiPAP. The pressure is well tolerated.  The mask is comfortable-nasal pillows. No mask leak. Some daytime sleepiness. No driving. No dry nose or throat. Some fatigue. States sleep schedule varies, has to get up do to back pain. .  States he did get a new bed.   +naps during the day. No headache in am. No weight gain. No caffienated beverages during the day. No alcohol. ESS is 10.         Sleep Medicine 4/20/2022 12/10/2021 5/27/2021 4/13/2021 2/1/2021 4/8/2020 4/8/2020   Sitting and reading 3 3 2 2 2 2 2   Watching TV 3 3 3 2 3 2 2   Sitting, inactive in a public place (e.g. a theatre or a meeting) 3 2 0 0 0 0 0   As a passenger in a car for an hour without a break 3 0 1 0 0 0 0   Lying down to rest in the afternoon when circumstances permit 3 3 3 2 3 3 3   Sitting and talking to someone 2 2 0 2 0 0 0   Sitting quietly after a lunch without alcohol 3 2 3 3 2 2 2   In a car, while stopped for a few minutes in traffic 3 0 0 0 0 0 2   Total score 23 15 12 11 10 9 11   Neck circumference (Inches) - - - - - - -       Past Medical History:  Past Medical History:   Diagnosis Date    Anemia     Angina     Arthritis     CAD (coronary artery disease)     [facility]     CKD (chronic kidney disease) stage 3, GFR 30-59 ml/min ([facility])     Clostridium difficile diarrhea 3/16/12; 2/29/12    positive stool toxin    Diabetes mellitus ([facility]     Diabetic neuropathy ([facility])     feet and legs    Disease of blood and blood forming organ     Dizziness     When he moves his head/ loses his balance    GERD (gastroesophageal reflux disease)     gastric ulcer    Headache     Hearing loss     Hyperlipidemia     Hypertension     Kidney stone 2002    Refusal of blood transfusions as patient is Jehovah's witness     Sleep apnea     Tinnitus     Venous ulcer ([facility]     LLE    Wound, open 1/13/2012       Past Surgical History:        Procedure Laterality Date   [facility] CARDIAC SURGERY      Dec 27 2010, triple bypass    CHOLECYSTECTOMY  9/2011    HERNIA REPAIR oxyCODONE-acetaminophen (PERCOCET) 5-325 MG per tablet, Take 1 tablet by mouth 4 times daily for 30 days. , Disp: 120 tablet, Rfl: 0    atorvastatin (LIPITOR) 80 MG tablet, Take 1 tablet by mouth nightly, Disp: 30 tablet, Rfl: 3    pregabalin (LYRICA) 25 MG capsule, Take 1 capsule by mouth 2 times daily for 1 day., Disp: 2 capsule, Rfl: 0    allopurinol (ZYLOPRIM) 300 MG tablet, Take 1 tablet by mouth daily, Disp: 30 tablet, Rfl: 0    acetaminophen (TYLENOL) 500 MG tablet, Take 2 tablets by mouth 4 times daily as needed for Pain, Disp: 60 tablet, Rfl: 0    senna (SENOKOT) 8.6 MG TABS tablet, Take 1 tablet by mouth 2 times daily, Disp: 120 tablet, Rfl: 0    docusate sodium (COLACE, DULCOLAX) 100 MG CAPS, Take 100 mg by mouth 2 times daily, Disp: , Rfl:     Compression Stockings MISC, by Does not apply route 2 pair, knee high compression stockings, fitted/ zippered, Disp: 2 each, Rfl: 1    silver sulfADIAZINE (SILVADENE) 1 % cream, Apply to BL lower leg ulcers daily, Disp: 20 g, Rfl: 0    nitroGLYCERIN (NITROSTAT) 0.4 MG SL tablet, Place 1 tablet under the tongue every 5 minutes as needed, Disp: 25 tablet, Rfl: 1    fluticasone (FLONASE) 50 MCG/ACT nasal spray, 1 spray by Nasal route nightly as needed. , Disp: , Rfl:     aspirin 81 MG chewable tablet, Take 1 tablet by mouth daily. , Disp: 30 tablet, Rfl:     ferrous sulfate 325 (65 FE) MG tablet, Take 325 mg by mouth 2 times daily , Disp: , Rfl:     omeprazole (PRILOSEC) 20 MG capsule, Take 20 mg by mouth 2 times daily. , Disp: , Rfl:     naloxone 4 MG/0.1ML LIQD nasal spray, 1 spray by Nasal route as needed for Opioid Reversal (Patient not taking: Reported on 4/1/2022), Disp: 1 each, Rfl: 0    hydrocortisone (ANUSOL-HC) 2.5 % rectal cream, Place rectally 2 times daily. (Patient not taking: Reported on 4/20/2022), Disp: 1 Tube, Rfl: 0      Objective:   PHYSICAL EXAM:    There were no vitals taken for this visit.     DATA:   9/28 17 PSG AHI 35.4/REM AHI 45.7, PLMS 38, low SPO2 73%  11/16/17 titration-controlled sleep-related breathing with BiPAP, PLMS 86.8 recommendations BiPAP 14/8 cm H2O  8/25/21 echo EF 55-60%    BIPAP compliance data:  Compliance download report from 3/4/18 to 4/2/18 reviewed today by me and showed patient is using machine 3:23 hrs/night with 30% compliance and AHI 0.8 within this time frame. 9/30days with greater than 4 hours of machine use. 30/30days with any BIPAP use  BIPAP 14/8 cm H20    Compliance download report from 4/17/18 to 5/16/18 showed patient is using machine 5:27 hrs/night with 63.3% compliance and AHI 5.1 within this time frame. 19/30days with greater than 4 hours of machine use. BIPAP 14/8 cm H20    Compliance download report from 6/11/18 to 7/10/18 showed patient is using machine 6:42 hrs/night with 100% compliance and AHI 1.4 within this time frame. 30/30days with greater than 4 hours of machine use. BIPAP 14/8 cm H20    Compliance download report from 3/2/19 to 3/31/19 showed patient is using machine 14 min/night with 0% compliance and AHI 5.5 within this time frame. 0/30days with greater than 4 hours of machine use. BIPAP 14/8 cm H20    Compliance download report from 8/4/19 to 9/2/19 showed patient is using machine 2:47 hrs/night with 10% compliance and AHI 1.1 within this time frame. 3/30days with greater than 4 hours of machine use. 29/30 days with any BIPAP use. BIPAP 14/8 cm H20    Compliance download report from 3/4/20 to 4/3/20 reviewed today by me and showed patient is using machine 4:58 hrs/night with 74.2% compliance and AHI 1.2 within this time frame. 21/31 days with greater than 4 hours of machine use. BIPAP 14/8 cm H20    2/1/2021-unable to obtain BiPAP download report. Patient's current BiPAP is broken, he does have loaner from DME.     New BIPAP:  Compliance download report from 3/6/21 to 4/4/21 reviewed today by me and showed patient is using machine 3:50 hrs/night with 46% compliance and AHI 1.2 within this time frame. 14/30days with greater than 4 hours of machine use. BIPAP 14/6 cm H20     Compliance download report from 4/25/21 to 5/24/21 reviewed today by me and showed patient is using machine 3:01 hrs/night with 27% compliance and AHI 1.1 within this time frame. 8/30days with greater than 4 hours of machine use. 30/30 days with any BiPAP use  BIPAP 14/6 cm H20     12/10/2021-no BiPAP use in past 30+ days    4/20/22-no BiPAP use in past 30+ days      Assessment:      Severe HENNA. BIPAP 14/8 cm H2O. patient not tolerating BiPAP, states he is using O2 at night currently  · Hypersomnia- sedating medications, poor sleep hygiene, co morbid conditions, likely contributing-improving. · Irregular sleep pattern due to pain  · Poor sleep hygiene  · Obesity   · Cardiomyopathy, CAD s/p CABG x3, pulmonary HTN, neuropathy, DM, HTN  · Chronic pain- followed by pain management. Plan:      -BiPAP titration as patient is not tolerating BiPAP and to evaluate for O2 needs if any  -Discussed severity of sleep apnea and importance of BiPAP use especially in relation to patient's comorbid conditions  -Supply order to DME of patient's choice  -Chin strap if not using full face mask  - Advised to use BiPAP 6-8 hrs at night and during naps-continue to increase use. - Replacement of mask, tubing, head straps every 3-6 months or sooner if damaged. - Patient instructed to contact DME company for any mask, tubing or machine trouble shooting if problems arise.  - Sleep hygiene  -Continue to work with pain management for chronic pain  -Cognitive behavioral therapy was discussed with patient including stimulus control and sleep restriction   -Recommend set bed/wake times, no naps during the daytime. Use BiPAP when sleeping.  - Avoid sedatives, alcohol and caffeinated drinks at bed time.   - Patient counseled to never drive or operate heavy machinery while fatigue, drowsy or sleepy- patient verbalized understanding and agrees. - Weight loss is recommended as a long-term intervention.    - Complications of HENNA if not treated were discussed with patient patient, including: systemic hypertension, pulmonary hypertension, cardiovascular morbidities, car accidents and all cause mortality.  -Patient education regarding sleep tips and PAP cleaning recommendations     Karsten Ramirez is a 75 y.o. male evaluated via telephone on 4/20/2022 for Sleep Apnea (discuss HENNA/completing BiPAP titration)  . Total Time: minutes: 21-30 minutes    Karsten Ramirez was evaluated through a synchronous (real-time) audio encounter. Patient identification was verified at the start of the visit. He (or guardian if applicable) is aware that this is a billable service, which includes applicable co-pays. This visit was conducted with the patient's (and/or legal guardian's) verbal consent. He has not had a related appointment within my department in the past 7 days or scheduled within the next 24 hours. The patient was located in a state where the provider was licensed to provide care.     Note: not billable if this call serves to triage the patient into an appointment for the relevant concern    Thao Waldrop, PATRICK - CNP

## 2022-04-20 NOTE — PATIENT INSTRUCTIONS

## 2022-04-24 ENCOUNTER — APPOINTMENT (OUTPATIENT)
Dept: GENERAL RADIOLOGY | Age: 67
DRG: 291 | End: 2022-04-24
Payer: MEDICARE

## 2022-04-24 ENCOUNTER — HOSPITAL ENCOUNTER (INPATIENT)
Age: 67
LOS: 9 days | Discharge: HOME | DRG: 291 | End: 2022-05-03
Attending: EMERGENCY MEDICINE | Admitting: INTERNAL MEDICINE
Payer: MEDICARE

## 2022-04-24 DIAGNOSIS — I50.33 ACUTE ON CHRONIC DIASTOLIC CONGESTIVE HEART FAILURE (HCC): ICD-10-CM

## 2022-04-24 DIAGNOSIS — N17.9 AKI (ACUTE KIDNEY INJURY) (HCC): ICD-10-CM

## 2022-04-24 DIAGNOSIS — J96.01 ACUTE RESPIRATORY FAILURE WITH HYPOXIA (HCC): Primary | ICD-10-CM

## 2022-04-24 PROBLEM — I50.31 ACUTE DIASTOLIC HF (HEART FAILURE) (HCC): Status: ACTIVE | Noted: 2022-04-24

## 2022-04-24 LAB
A/G RATIO: 1.5 (ref 1.1–2.2)
ALBUMIN SERPL-MCNC: 4 G/DL (ref 3.4–5)
ALP BLD-CCNC: 81 U/L (ref 40–129)
ALT SERPL-CCNC: 8 U/L (ref 10–40)
ANION GAP SERPL CALCULATED.3IONS-SCNC: 12 MMOL/L (ref 3–16)
AST SERPL-CCNC: 17 U/L (ref 15–37)
BASE EXCESS VENOUS: 4.4 MMOL/L (ref -3–3)
BASOPHILS ABSOLUTE: 0 K/UL (ref 0–0.2)
BASOPHILS RELATIVE PERCENT: 0.6 %
BILIRUB SERPL-MCNC: 0.4 MG/DL (ref 0–1)
BUN BLDV-MCNC: 78 MG/DL (ref 7–20)
CALCIUM SERPL-MCNC: 9.5 MG/DL (ref 8.3–10.6)
CARBOXYHEMOGLOBIN: 2.1 % (ref 0–1.5)
CHLORIDE BLD-SCNC: 96 MMOL/L (ref 99–110)
CO2: 29 MMOL/L (ref 21–32)
CREAT SERPL-MCNC: 1.9 MG/DL (ref 0.8–1.3)
EOSINOPHILS ABSOLUTE: 0.1 K/UL (ref 0–0.6)
EOSINOPHILS RELATIVE PERCENT: 2.1 %
GFR AFRICAN AMERICAN: 43
GFR NON-AFRICAN AMERICAN: 35
GLUCOSE BLD-MCNC: 129 MG/DL (ref 70–99)
HCO3 VENOUS: 30.3 MMOL/L (ref 23–29)
HCT VFR BLD CALC: 31.9 % (ref 40.5–52.5)
HEMOGLOBIN: 10.1 G/DL (ref 13.5–17.5)
LYMPHOCYTES ABSOLUTE: 0.7 K/UL (ref 1–5.1)
LYMPHOCYTES RELATIVE PERCENT: 10 %
MCH RBC QN AUTO: 27.5 PG (ref 26–34)
MCHC RBC AUTO-ENTMCNC: 31.6 G/DL (ref 31–36)
MCV RBC AUTO: 87.2 FL (ref 80–100)
METHEMOGLOBIN VENOUS: 0.4 %
MONOCYTES ABSOLUTE: 0.6 K/UL (ref 0–1.3)
MONOCYTES RELATIVE PERCENT: 8.6 %
NEUTROPHILS ABSOLUTE: 5.5 K/UL (ref 1.7–7.7)
NEUTROPHILS RELATIVE PERCENT: 78.7 %
O2 SAT, VEN: 83 %
O2 THERAPY: ABNORMAL
PCO2, VEN: 50.9 MMHG (ref 40–50)
PDW BLD-RTO: 18.9 % (ref 12.4–15.4)
PH VENOUS: 7.39 (ref 7.35–7.45)
PLATELET # BLD: 191 K/UL (ref 135–450)
PMV BLD AUTO: 8.9 FL (ref 5–10.5)
PO2, VEN: 49.4 MMHG (ref 25–40)
POTASSIUM REFLEX MAGNESIUM: 4.7 MMOL/L (ref 3.5–5.1)
PRO-BNP: 4308 PG/ML (ref 0–124)
RBC # BLD: 3.66 M/UL (ref 4.2–5.9)
SODIUM BLD-SCNC: 137 MMOL/L (ref 136–145)
TCO2 CALC VENOUS: 32 MMOL/L
TOTAL PROTEIN: 6.6 G/DL (ref 6.4–8.2)
TROPONIN: 0.05 NG/ML
WBC # BLD: 6.9 K/UL (ref 4–11)

## 2022-04-24 PROCEDURE — 80053 COMPREHEN METABOLIC PANEL: CPT

## 2022-04-24 PROCEDURE — 2700000000 HC OXYGEN THERAPY PER DAY

## 2022-04-24 PROCEDURE — 71045 X-RAY EXAM CHEST 1 VIEW: CPT

## 2022-04-24 PROCEDURE — 85025 COMPLETE CBC W/AUTO DIFF WBC: CPT

## 2022-04-24 PROCEDURE — 83880 ASSAY OF NATRIURETIC PEPTIDE: CPT

## 2022-04-24 PROCEDURE — 82803 BLOOD GASES ANY COMBINATION: CPT

## 2022-04-24 PROCEDURE — 1200000000 HC SEMI PRIVATE

## 2022-04-24 PROCEDURE — 83550 IRON BINDING TEST: CPT

## 2022-04-24 PROCEDURE — 99285 EMERGENCY DEPT VISIT HI MDM: CPT

## 2022-04-24 PROCEDURE — 83540 ASSAY OF IRON: CPT

## 2022-04-24 PROCEDURE — 84484 ASSAY OF TROPONIN QUANT: CPT

## 2022-04-24 PROCEDURE — 6360000002 HC RX W HCPCS: Performed by: PHYSICIAN ASSISTANT

## 2022-04-24 PROCEDURE — 6370000000 HC RX 637 (ALT 250 FOR IP): Performed by: PHYSICIAN ASSISTANT

## 2022-04-24 PROCEDURE — 93005 ELECTROCARDIOGRAM TRACING: CPT | Performed by: EMERGENCY MEDICINE

## 2022-04-24 PROCEDURE — 96374 THER/PROPH/DIAG INJ IV PUSH: CPT

## 2022-04-24 RX ORDER — FUROSEMIDE 10 MG/ML
40 INJECTION INTRAMUSCULAR; INTRAVENOUS ONCE
Status: COMPLETED | OUTPATIENT
Start: 2022-04-24 | End: 2022-04-24

## 2022-04-24 RX ADMIN — FUROSEMIDE 40 MG: 10 INJECTION, SOLUTION INTRAMUSCULAR; INTRAVENOUS at 23:08

## 2022-04-24 RX ADMIN — NITROGLYCERIN 0.5 INCH: 20 OINTMENT TOPICAL at 23:08

## 2022-04-24 ASSESSMENT — ENCOUNTER SYMPTOMS
COUGH: 0
NAUSEA: 0
BACK PAIN: 0
ABDOMINAL PAIN: 0
ABDOMINAL DISTENTION: 1
COLOR CHANGE: 0
EYES NEGATIVE: 1
VOMITING: 0
SHORTNESS OF BREATH: 1

## 2022-04-24 ASSESSMENT — PAIN SCALES - GENERAL: PAINLEVEL_OUTOF10: 6

## 2022-04-25 PROBLEM — R77.8 ELEVATED TROPONIN: Status: ACTIVE | Noted: 2022-04-25

## 2022-04-25 PROBLEM — R79.89 ELEVATED TROPONIN: Status: ACTIVE | Noted: 2022-04-25

## 2022-04-25 LAB
ANION GAP SERPL CALCULATED.3IONS-SCNC: 12 MMOL/L (ref 3–16)
BUN BLDV-MCNC: 85 MG/DL (ref 7–20)
CALCIUM SERPL-MCNC: 9.5 MG/DL (ref 8.3–10.6)
CHLORIDE BLD-SCNC: 95 MMOL/L (ref 99–110)
CO2: 29 MMOL/L (ref 21–32)
CREAT SERPL-MCNC: 1.8 MG/DL (ref 0.8–1.3)
EKG ATRIAL RATE: 66 BPM
EKG DIAGNOSIS: NORMAL
EKG Q-T INTERVAL: 406 MS
EKG QRS DURATION: 74 MS
EKG QTC CALCULATION (BAZETT): 422 MS
EKG R AXIS: -33 DEGREES
EKG T AXIS: 115 DEGREES
EKG VENTRICULAR RATE: 65 BPM
GFR AFRICAN AMERICAN: 46
GFR NON-AFRICAN AMERICAN: 38
GLUCOSE BLD-MCNC: 111 MG/DL (ref 70–99)
GLUCOSE BLD-MCNC: 235 MG/DL (ref 70–99)
GLUCOSE BLD-MCNC: 245 MG/DL (ref 70–99)
GLUCOSE BLD-MCNC: 245 MG/DL (ref 70–99)
GLUCOSE BLD-MCNC: 268 MG/DL (ref 70–99)
GLUCOSE BLD-MCNC: 280 MG/DL (ref 70–99)
IRON SATURATION: 15 % (ref 20–50)
IRON: 36 UG/DL (ref 59–158)
PERFORMED ON: ABNORMAL
POTASSIUM REFLEX MAGNESIUM: 4.6 MMOL/L (ref 3.5–5.1)
SODIUM BLD-SCNC: 136 MMOL/L (ref 136–145)
TOTAL IRON BINDING CAPACITY: 239 UG/DL (ref 260–445)

## 2022-04-25 PROCEDURE — 2580000003 HC RX 258: Performed by: INTERNAL MEDICINE

## 2022-04-25 PROCEDURE — 99223 1ST HOSP IP/OBS HIGH 75: CPT | Performed by: INTERNAL MEDICINE

## 2022-04-25 PROCEDURE — 6370000000 HC RX 637 (ALT 250 FOR IP): Performed by: NURSE PRACTITIONER

## 2022-04-25 PROCEDURE — 6370000000 HC RX 637 (ALT 250 FOR IP): Performed by: INTERNAL MEDICINE

## 2022-04-25 PROCEDURE — 2700000000 HC OXYGEN THERAPY PER DAY

## 2022-04-25 PROCEDURE — 99291 CRITICAL CARE FIRST HOUR: CPT | Performed by: INTERNAL MEDICINE

## 2022-04-25 PROCEDURE — 2500000003 HC RX 250 WO HCPCS: Performed by: INTERNAL MEDICINE

## 2022-04-25 PROCEDURE — 1200000000 HC SEMI PRIVATE

## 2022-04-25 PROCEDURE — 6360000002 HC RX W HCPCS: Performed by: INTERNAL MEDICINE

## 2022-04-25 PROCEDURE — 36415 COLL VENOUS BLD VENIPUNCTURE: CPT

## 2022-04-25 PROCEDURE — 93010 ELECTROCARDIOGRAM REPORT: CPT | Performed by: INTERNAL MEDICINE

## 2022-04-25 PROCEDURE — 80048 BASIC METABOLIC PNL TOTAL CA: CPT

## 2022-04-25 RX ORDER — NICOTINE POLACRILEX 4 MG
15 LOZENGE BUCCAL PRN
Status: DISCONTINUED | OUTPATIENT
Start: 2022-04-25 | End: 2022-05-03 | Stop reason: HOSPADM

## 2022-04-25 RX ORDER — OXYMETAZOLINE HYDROCHLORIDE 0.05 G/100ML
2 SPRAY NASAL 2 TIMES DAILY PRN
Status: DISPENSED | OUTPATIENT
Start: 2022-04-25 | End: 2022-04-28

## 2022-04-25 RX ORDER — ASPIRIN 81 MG/1
81 TABLET, CHEWABLE ORAL DAILY
Status: DISCONTINUED | OUTPATIENT
Start: 2022-04-25 | End: 2022-05-03 | Stop reason: HOSPADM

## 2022-04-25 RX ORDER — PREGABALIN 25 MG/1
25 CAPSULE ORAL 2 TIMES DAILY
Status: DISCONTINUED | OUTPATIENT
Start: 2022-04-25 | End: 2022-05-03 | Stop reason: HOSPADM

## 2022-04-25 RX ORDER — ENOXAPARIN SODIUM 100 MG/ML
30 INJECTION SUBCUTANEOUS 2 TIMES DAILY
Status: DISCONTINUED | OUTPATIENT
Start: 2022-04-25 | End: 2022-04-29

## 2022-04-25 RX ORDER — ATORVASTATIN CALCIUM 80 MG/1
80 TABLET, FILM COATED ORAL NIGHTLY
Status: DISCONTINUED | OUTPATIENT
Start: 2022-04-25 | End: 2022-05-03 | Stop reason: HOSPADM

## 2022-04-25 RX ORDER — ISOSORBIDE MONONITRATE 30 MG/1
15 TABLET, EXTENDED RELEASE ORAL DAILY
Status: DISCONTINUED | OUTPATIENT
Start: 2022-04-25 | End: 2022-04-27

## 2022-04-25 RX ORDER — TAMSULOSIN HYDROCHLORIDE 0.4 MG/1
0.4 CAPSULE ORAL DAILY
Status: DISCONTINUED | OUTPATIENT
Start: 2022-04-25 | End: 2022-05-03 | Stop reason: HOSPADM

## 2022-04-25 RX ORDER — NITROGLYCERIN 0.4 MG/1
0.4 TABLET SUBLINGUAL EVERY 5 MIN PRN
Status: DISCONTINUED | OUTPATIENT
Start: 2022-04-25 | End: 2022-05-03 | Stop reason: HOSPADM

## 2022-04-25 RX ORDER — ACETAMINOPHEN 650 MG/1
650 SUPPOSITORY RECTAL EVERY 6 HOURS PRN
Status: DISCONTINUED | OUTPATIENT
Start: 2022-04-25 | End: 2022-05-03 | Stop reason: HOSPADM

## 2022-04-25 RX ORDER — SODIUM CHLORIDE 0.9 % (FLUSH) 0.9 %
5-40 SYRINGE (ML) INJECTION PRN
Status: DISCONTINUED | OUTPATIENT
Start: 2022-04-25 | End: 2022-05-03 | Stop reason: HOSPADM

## 2022-04-25 RX ORDER — ONDANSETRON 4 MG/1
4 TABLET, ORALLY DISINTEGRATING ORAL EVERY 8 HOURS PRN
Status: DISCONTINUED | OUTPATIENT
Start: 2022-04-25 | End: 2022-05-03 | Stop reason: HOSPADM

## 2022-04-25 RX ORDER — INSULIN LISPRO 100 [IU]/ML
0-6 INJECTION, SOLUTION INTRAVENOUS; SUBCUTANEOUS NIGHTLY
Status: DISCONTINUED | OUTPATIENT
Start: 2022-04-25 | End: 2022-05-03 | Stop reason: HOSPADM

## 2022-04-25 RX ORDER — PANTOPRAZOLE SODIUM 20 MG/1
20 TABLET, DELAYED RELEASE ORAL
Status: DISCONTINUED | OUTPATIENT
Start: 2022-04-25 | End: 2022-05-03 | Stop reason: HOSPADM

## 2022-04-25 RX ORDER — DOCUSATE SODIUM 100 MG/1
100 CAPSULE, LIQUID FILLED ORAL 2 TIMES DAILY
Status: DISCONTINUED | OUTPATIENT
Start: 2022-04-25 | End: 2022-05-03 | Stop reason: HOSPADM

## 2022-04-25 RX ORDER — CARVEDILOL 3.12 MG/1
3.12 TABLET ORAL 2 TIMES DAILY WITH MEALS
Status: DISCONTINUED | OUTPATIENT
Start: 2022-04-25 | End: 2022-05-03 | Stop reason: HOSPADM

## 2022-04-25 RX ORDER — DEXTROSE MONOHYDRATE 25 G/50ML
12.5 INJECTION, SOLUTION INTRAVENOUS PRN
Status: DISCONTINUED | OUTPATIENT
Start: 2022-04-25 | End: 2022-04-25

## 2022-04-25 RX ORDER — INSULIN GLARGINE 100 [IU]/ML
35 INJECTION, SOLUTION SUBCUTANEOUS DAILY
Status: DISCONTINUED | OUTPATIENT
Start: 2022-04-25 | End: 2022-05-03 | Stop reason: HOSPADM

## 2022-04-25 RX ORDER — SODIUM CHLORIDE 0.9 % (FLUSH) 0.9 %
5-40 SYRINGE (ML) INJECTION EVERY 12 HOURS SCHEDULED
Status: DISCONTINUED | OUTPATIENT
Start: 2022-04-25 | End: 2022-05-03 | Stop reason: HOSPADM

## 2022-04-25 RX ORDER — ACETAMINOPHEN 325 MG/1
650 TABLET ORAL EVERY 6 HOURS PRN
Status: DISCONTINUED | OUTPATIENT
Start: 2022-04-25 | End: 2022-05-03 | Stop reason: HOSPADM

## 2022-04-25 RX ORDER — SODIUM CHLORIDE 9 MG/ML
INJECTION, SOLUTION INTRAVENOUS PRN
Status: DISCONTINUED | OUTPATIENT
Start: 2022-04-25 | End: 2022-05-03 | Stop reason: HOSPADM

## 2022-04-25 RX ORDER — FERROUS SULFATE 325(65) MG
325 TABLET ORAL 2 TIMES DAILY
Status: DISCONTINUED | OUTPATIENT
Start: 2022-04-25 | End: 2022-05-03 | Stop reason: HOSPADM

## 2022-04-25 RX ORDER — OXYCODONE HYDROCHLORIDE AND ACETAMINOPHEN 5; 325 MG/1; MG/1
1 TABLET ORAL EVERY 6 HOURS PRN
Status: DISCONTINUED | OUTPATIENT
Start: 2022-04-25 | End: 2022-05-03 | Stop reason: HOSPADM

## 2022-04-25 RX ORDER — ONDANSETRON 2 MG/ML
4 INJECTION INTRAMUSCULAR; INTRAVENOUS EVERY 6 HOURS PRN
Status: DISCONTINUED | OUTPATIENT
Start: 2022-04-25 | End: 2022-05-03 | Stop reason: HOSPADM

## 2022-04-25 RX ORDER — SENNA PLUS 8.6 MG/1
1 TABLET ORAL 2 TIMES DAILY
Status: DISCONTINUED | OUTPATIENT
Start: 2022-04-25 | End: 2022-05-03 | Stop reason: HOSPADM

## 2022-04-25 RX ORDER — INSULIN LISPRO 100 [IU]/ML
0-12 INJECTION, SOLUTION INTRAVENOUS; SUBCUTANEOUS
Status: DISCONTINUED | OUTPATIENT
Start: 2022-04-25 | End: 2022-05-03 | Stop reason: HOSPADM

## 2022-04-25 RX ORDER — ALLOPURINOL 300 MG/1
300 TABLET ORAL DAILY
Status: DISCONTINUED | OUTPATIENT
Start: 2022-04-25 | End: 2022-05-03 | Stop reason: HOSPADM

## 2022-04-25 RX ORDER — POLYETHYLENE GLYCOL 3350 17 G/17G
17 POWDER, FOR SOLUTION ORAL DAILY PRN
Status: DISCONTINUED | OUTPATIENT
Start: 2022-04-25 | End: 2022-05-03 | Stop reason: HOSPADM

## 2022-04-25 RX ORDER — DEXTROSE MONOHYDRATE 50 MG/ML
100 INJECTION, SOLUTION INTRAVENOUS PRN
Status: DISCONTINUED | OUTPATIENT
Start: 2022-04-25 | End: 2022-05-03 | Stop reason: HOSPADM

## 2022-04-25 RX ADMIN — DOCUSATE SODIUM 100 MG: 100 CAPSULE, LIQUID FILLED ORAL at 23:40

## 2022-04-25 RX ADMIN — TAMSULOSIN HYDROCHLORIDE 0.4 MG: 0.4 CAPSULE ORAL at 06:29

## 2022-04-25 RX ADMIN — SENNOSIDES 8.6 MG: 8.6 TABLET, COATED ORAL at 10:14

## 2022-04-25 RX ADMIN — PREGABALIN 25 MG: 25 CAPSULE ORAL at 23:40

## 2022-04-25 RX ADMIN — SILVER SULFADIAZINE: 10 CREAM TOPICAL at 15:58

## 2022-04-25 RX ADMIN — DOCUSATE SODIUM 100 MG: 100 CAPSULE, LIQUID FILLED ORAL at 01:44

## 2022-04-25 RX ADMIN — FERROUS SULFATE TAB 325 MG (65 MG ELEMENTAL FE) 325 MG: 325 (65 FE) TAB at 01:44

## 2022-04-25 RX ADMIN — ATORVASTATIN CALCIUM 80 MG: 80 TABLET, FILM COATED ORAL at 23:39

## 2022-04-25 RX ADMIN — SENNOSIDES 8.6 MG: 8.6 TABLET, COATED ORAL at 23:39

## 2022-04-25 RX ADMIN — OXYMETAZOLINE HCL 2 SPRAY: 0.05 SPRAY NASAL at 15:57

## 2022-04-25 RX ADMIN — DOCUSATE SODIUM 100 MG: 100 CAPSULE, LIQUID FILLED ORAL at 10:14

## 2022-04-25 RX ADMIN — PREGABALIN 25 MG: 25 CAPSULE ORAL at 04:24

## 2022-04-25 RX ADMIN — PREGABALIN 25 MG: 25 CAPSULE ORAL at 10:14

## 2022-04-25 RX ADMIN — SODIUM CHLORIDE, PRESERVATIVE FREE 10 ML: 5 INJECTION INTRAVENOUS at 10:15

## 2022-04-25 RX ADMIN — INSULIN LISPRO 4 UNITS: 100 INJECTION, SOLUTION INTRAVENOUS; SUBCUTANEOUS at 10:19

## 2022-04-25 RX ADMIN — FUROSEMIDE 5 MG/HR: 10 INJECTION INTRAMUSCULAR; INTRAVENOUS at 15:57

## 2022-04-25 RX ADMIN — CARVEDILOL 3.12 MG: 3.12 TABLET, FILM COATED ORAL at 17:15

## 2022-04-25 RX ADMIN — ENOXAPARIN SODIUM 30 MG: 100 INJECTION SUBCUTANEOUS at 10:15

## 2022-04-25 RX ADMIN — INSULIN LISPRO 6 UNITS: 100 INJECTION, SOLUTION INTRAVENOUS; SUBCUTANEOUS at 12:21

## 2022-04-25 RX ADMIN — FERROUS SULFATE TAB 325 MG (65 MG ELEMENTAL FE) 325 MG: 325 (65 FE) TAB at 23:39

## 2022-04-25 RX ADMIN — INSULIN GLARGINE 35 UNITS: 100 INJECTION, SOLUTION SUBCUTANEOUS at 10:20

## 2022-04-25 RX ADMIN — PANTOPRAZOLE SODIUM 20 MG: 20 TABLET, DELAYED RELEASE ORAL at 16:11

## 2022-04-25 RX ADMIN — ASPIRIN 81 MG 81 MG: 81 TABLET ORAL at 10:14

## 2022-04-25 RX ADMIN — MICONAZOLE NITRATE: 2 POWDER TOPICAL at 23:53

## 2022-04-25 RX ADMIN — FERROUS SULFATE TAB 325 MG (65 MG ELEMENTAL FE) 325 MG: 325 (65 FE) TAB at 10:15

## 2022-04-25 RX ADMIN — OXYCODONE AND ACETAMINOPHEN 1 TABLET: 5; 325 TABLET ORAL at 12:01

## 2022-04-25 RX ADMIN — INSULIN LISPRO 3 UNITS: 100 INJECTION, SOLUTION INTRAVENOUS; SUBCUTANEOUS at 23:40

## 2022-04-25 RX ADMIN — ALLOPURINOL 300 MG: 300 TABLET ORAL at 10:14

## 2022-04-25 RX ADMIN — ISOSORBIDE MONONITRATE 15 MG: 30 TABLET, EXTENDED RELEASE ORAL at 10:15

## 2022-04-25 RX ADMIN — SENNOSIDES 8.6 MG: 8.6 TABLET, COATED ORAL at 01:44

## 2022-04-25 RX ADMIN — OXYCODONE AND ACETAMINOPHEN 1 TABLET: 5; 325 TABLET ORAL at 18:34

## 2022-04-25 RX ADMIN — INSULIN LISPRO 4 UNITS: 100 INJECTION, SOLUTION INTRAVENOUS; SUBCUTANEOUS at 17:15

## 2022-04-25 RX ADMIN — FUROSEMIDE 5 MG/HR: 10 INJECTION INTRAMUSCULAR; INTRAVENOUS at 01:48

## 2022-04-25 RX ADMIN — CARVEDILOL 3.12 MG: 3.12 TABLET, FILM COATED ORAL at 10:14

## 2022-04-25 RX ADMIN — OXYCODONE AND ACETAMINOPHEN 1 TABLET: 5; 325 TABLET ORAL at 04:24

## 2022-04-25 RX ADMIN — PANTOPRAZOLE SODIUM 20 MG: 20 TABLET, DELAYED RELEASE ORAL at 06:29

## 2022-04-25 RX ADMIN — ENOXAPARIN SODIUM 30 MG: 100 INJECTION SUBCUTANEOUS at 23:40

## 2022-04-25 ASSESSMENT — PAIN SCALES - GENERAL
PAINLEVEL_OUTOF10: 8
PAINLEVEL_OUTOF10: 9
PAINLEVEL_OUTOF10: 8
PAINLEVEL_OUTOF10: 7
PAINLEVEL_OUTOF10: 9
PAINLEVEL_OUTOF10: 9
PAINLEVEL_OUTOF10: 8

## 2022-04-25 ASSESSMENT — PAIN DESCRIPTION - LOCATION: LOCATION: LEG

## 2022-04-25 NOTE — CARE COORDINATION
CASE MANAGEMENT INITIAL ASSESSMENT      Reviewed chart and completed assessment with patient:at bedside  Family present: no  Explained Case Management role/services. yes    Primary contact information:see below    Health Care Decision Maker :   Primary Decision Maker: Jeffery Rahman - [phone]          Can this person be reached and be able to respond quickly, such as within a few minutes or hours? Yes  Admit date/status:04/24/2022  Diagnosis:Acute diastolic CHF   Is this a Readmission?:  Yes, pt returned w/weight gain r/t CHF and SOB. Insurance:[employer]   Precert required for SNF: Yes       3 night stay required: No    Living arrangements, Adls, care needs, prior to admission:Pt lives alone in an apartment w/2 VENITA, has assistance w/ADL's, friends drive     Durable Medical Equipment at home:  Walker_x_Cane_x_RTS__ BSC__Shower Chair__  02_x 3.5 L O2 through [employer]_ HHN__ CPAP__  BiPap__  Hospital Bed__ W/C___ Other_____    · Services in the home and/or outpatient, prior to admission:[employer] 3/5 L O2, [facility] non skilled; [facility] skilled      Current PCP:PATRICK Sawyer                            Medications:no concerns Prescription coverage? Yes Will pt require financial assistance with medications No     Transportation needs: friends will transport       PT/OT recs:will order when appropriate    Hospital Exemption Notification (HEN):not initiated    Barriers to discharge:none    Plan/comments: Pt lives at home w/services in place, but is interested in transitioning to an AL, possibly. Pt has a Medicaid  who is working on this at this time for him and he is on the waiting list for [place] [address]. Pt frequently here for readmissions, may need SNF, but will need PT/OT recs. CM will continue to follow for needs and will assist as needed.  Rocio Cowart RN        [facility] on chart for MD signature

## 2022-04-25 NOTE — PROGRESS NOTES
Patient admitted to room 216 from ED. Patient oriented to room, call light, bed rails, phone, lights and bathroom. Patient instructed about the schedule of the day including: vital sign frequency, lab draws, possible tests, frequency of MD and staff rounds, including RN/MD rounding together at bedside, daily weights, and I &O's. Patient instructed about prescribed diet, how to use 8MENU, and television. Bed alarm in refused. Telemetry box in place, patient aware of placement and reason. Bed locked, in lowest position, side rails up 2/4, call light within reach. Will continue to monitor.

## 2022-04-25 NOTE — ED PROVIDER NOTES
[facility]  ED  EMERGENCY DEPARTMENT ENCOUNTER        Pt Name: Bhanu Daigle  MRN: [id number]  Smileygfmitch 1955  Date of evaluation: 4/24/2022  Provider: Magda Lorenzo PA-C  PCP: PATRICK Christie - CNP  ED Attending: Lizabeth Butt DO      This patient was seen by the attending provider     History provided by the patient    CHIEF COMPLAINT:     Chief Complaint   Patient presents with    Shortness of Breath     Hx CHF. Feels swollen & SOB. 3-4L @ home. Currently on 6L n/c. HISTORY OF PRESENT ILLNESS:      Bhanu Daigle is a 75 y.o. male who arrives to the ED by EMS. This patient comes from home. He has a history of CHF. He wears oxygen at 3 to 4 L, has to sleep upright in a recliner and lives a very sedentary lifestyle as a result of his CHF. He had a hospitalization in late March 2022 for CHF exacerbation. He required a Lasix drip at that time as his home, oral diuretics were not controlling his symptoms. He is here in the ED today secondary to increased shortness of breath and increased oxygen requirement. He is now requiring 6 L and additionally is complaining of chest discomfort. He has had increased swelling into his legs and abdomen. Patient concerned about a another flare of his CHF. Patient has chronic kidney disease and the balance and managing his CHF and protecting his kidneys has been difficult. Patient very uncomfortable sitting in the bed reporting dyspnea as well as chronic neuropathic pain. Symptoms exacerbated with activity. No alleviating factors. Nursing Notes were reviewed     REVIEW OF SYSTEMS:     Review of Systems   Constitutional: Negative for chills and fever. HENT: Negative. Eyes: Negative. Respiratory: Positive for shortness of breath. Negative for cough. Cardiovascular: Positive for chest pain and leg swelling. Gastrointestinal: Positive for abdominal distention. Negative for abdominal pain, nausea and vomiting. Genitourinary: Negative. Musculoskeletal: Negative for back pain, gait problem and neck pain. Skin: Negative for color change. Neurological: Negative for weakness and headaches. All other systems reviewed and are negative. Except as noted above in the ROS, all other systems were reviewed and negative.          PAST MEDICAL HISTORY:     Past Medical History:   Diagnosis Date    Anemia     Angina     Arthritis     CAD (coronary artery disease)     [facility]congestive [facility] failure) [facility]     CKD (chronic kidney disease) stage 3, GFR 30-59 ml/min ([facility])     Clostridium difficile diarrhea 3/16/12; 2/29/12    positive stool toxin    Diabetes mellitus ([address]     Diabetic neuropathy ([address]     feet and legs    Disease of blood and blood forming organ     Dizziness     When he moves his head/ loses his balance    GERD (gastroesophageal reflux disease)     gastric ulcer    Headache     Hearing loss     Hyperlipidemia     Hypertension     Kidney stone 2002    Refusal of blood transfusions as patient is Confucianist     Sleep apnea     Tinnitus     Venous ulcer ([address]     LLE    Wound, open 1/13/2012         SURGICAL HISTORY:      Past Surgical History:   Procedure Laterality Date    CARDIAC SURGERY      Dec 27 2010, triple bypass    CHOLECYSTECTOMY  9/2011    HERNIA REPAIR  age1    OTHER SURGICAL HISTORY  11-16-11 REPAIR LOWER STERNAL INCISION, REMOVAL OF ONE STERNAL WIRE,    OTHER SURGICAL HISTORY  10/10/2014    phacoemulsification of cataract with intraocular lens implant left eye    PAIN MANAGEMENT PROCEDURE N/A 2/10/2022    MIDLINE CERVICAL SIX SEVEN EPIDURAL STEROID INJECTION SITE CONFIRMED BY FLUOROSCOPY performed by Princess Patel MD at [address]:       Previous Medications    ACETAMINOPHEN (TYLENOL) 500 MG TABLET    Take 2 tablets by mouth 4 times daily as needed for Pain    ALLOPURINOL (ZYLOPRIM) 300 MG TABLET    Take 1 tablet by mouth daily    ASPIRIN 81 MG CHEWABLE TABLET    Take 1 tablet by mouth daily. ATORVASTATIN (LIPITOR) 80 MG TABLET    Take 1 tablet by mouth nightly    CARVEDILOL (COREG) 3.125 MG TABLET    Take 1 tablet by mouth 2 times daily (with meals)    COMPRESSION STOCKINGS MISC    by Does not apply route 2 pair, knee high compression stockings, fitted/ zippered    DOCUSATE SODIUM (COLACE, DULCOLAX) 100 MG CAPS    Take 100 mg by mouth 2 times daily    FERROUS SULFATE 325 (65 FE) MG TABLET    Take 325 mg by mouth 2 times daily     FLUTICASONE (FLONASE) 50 MCG/ACT NASAL SPRAY    1 spray by Nasal route nightly as needed. HYDROCORTISONE (ANUSOL-HC) 2.5 % RECTAL CREAM    Place rectally 2 times daily. INSULIN GLARGINE (LANTUS SOLOSTAR) 100 UNIT/ML INJECTION PEN    Inject 35 Units into the skin daily    INSULIN LISPRO (HUMALOG) 100 UNIT/ML INJECTION VIAL    Inject 10 Units into the skin 3 times daily (with meals)    ISOSORBIDE MONONITRATE (IMDUR) 30 MG EXTENDED RELEASE TABLET    Take 0.5 tablets by mouth daily    LINACLOTIDE (LINZESS) 290 MCG CAPS CAPSULE    Take 1 capsule by mouth every morning (before breakfast)    NALOXONE 4 MG/0.1ML LIQD NASAL SPRAY    1 spray by Nasal route as needed for Opioid Reversal    NITROGLYCERIN (NITROSTAT) 0.4 MG SL TABLET    Place 1 tablet under the tongue every 5 minutes as needed    OMEPRAZOLE (PRILOSEC) 20 MG CAPSULE    Take 20 mg by mouth 2 times daily. OXYCODONE-ACETAMINOPHEN (PERCOCET) 5-325 MG PER TABLET    Take 1 tablet by mouth 4 times daily for 30 days. POLYETHYLENE GLYCOL (GLYCOLAX) 17 G PACKET    Take 17 g by mouth 2 times daily    PREGABALIN (LYRICA) 25 MG CAPSULE    Take 1 capsule by mouth 2 times daily for 1 day.     SENNA (SENOKOT) 8.6 MG TABS TABLET    Take 1 tablet by mouth 2 times daily    SILVER SULFADIAZINE (SILVADENE) 1 % CREAM    Apply to BL lower leg ulcers daily    TAMSULOSIN (FLOMAX) 0.4 MG CAPSULE    Take 0.4 mg by mouth Daily TORSEMIDE (DEMADEX) 100 MG TABLET    Take 1 tablet by mouth daily         ALLERGIES:    Amitriptyline, Celecoxib, Cymbalta [duloxetine hcl], Elavil [amitriptyline hcl], Gabapentin, and Nsaids    FAMILY HISTORY:       Family History   Problem Relation Age of Onset    Heart Disease Mother     Heart Disease Father     Cancer Father     Diabetes Brother     Diabetes Brother           SOCIAL HISTORY:       Social History     Socioeconomic History    Marital status: Single     Spouse name: None    Number of children: None    Years of education: None    Highest education level: None   Occupational History    None   Tobacco Use    Smoking status: Former Smoker     Packs/day: 1.00     Years: 16.00     Pack years: 16.00     Types: Cigarettes     Quit date: 1/10/1988     Years since quittin.3    Smokeless tobacco: Never Used    Tobacco comment: 25 yrs ago   Vaping Use    Vaping Use: Never used   Substance and Sexual Activity    Alcohol use: No     Alcohol/week: 0.0 standard drinks    Drug use: No    Sexual activity: None   Other Topics Concern    None   Social History Narrative    None     Social Determinants of Health     Financial Resource Strain:     Difficulty of Paying Living Expenses: Not on file   Food Insecurity:     Worried About Running Out of Food in the Last Year: Not on file    James of Food in the Last Year: Not on file   Transportation Needs:     Lack of Transportation (Medical): Not on file    Lack of Transportation (Non-Medical):  Not on file   Physical Activity:     Days of Exercise per Week: Not on file    Minutes of Exercise per Session: Not on file   Stress:     Feeling of Stress : Not on file   Social Connections:     Frequency of Communication with Friends and Family: Not on file    Frequency of Social Gatherings with Friends and Family: Not on file    Attends Catholic Services: Not on file    Active Member of Clubs or Organizations: Not on file    Attends Club or Organization Meetings: Not on file    Marital Status: Not on file   Intimate Partner Violence:     Fear of Current or Ex-Partner: Not on file    Emotionally Abused: Not on file    Physically Abused: Not on file    Sexually Abused: Not on file   Housing Stability:     Unable to Pay for Housing in the Last Year: Not on file    Number of Justino in the Last Year: Not on file    Unstable Housing in the Last Year: Not on file       SCREENINGS:    Jigar Coma Scale  Eye Opening: Spontaneous  Best Verbal Response: Oriented  Best Motor Response: Obeys commands  Jigar Coma Scale Score: 15        PHYSICAL EXAM:       ED Triage Vitals   BP Temp Temp Source Pulse Resp SpO2 Height Weight   04/24/22 2028 04/24/22 2011 04/24/22 2011 04/24/22 2011 04/24/22 2011 04/24/22 2011 04/24/22 2011 04/24/22 2011   (!) 115/50 98.1 °F (36.7 °C) Oral 71 30 94 % 5' 9\" (1.753 m) (!) 317 lb (143.8 kg)     Body mass index is 46.81 kg/m². Physical Exam    CONSTITUTIONAL: Obese. Awake and alert. Cooperative. Well-developed. Well-nourished. Non-toxic. No acute distress. HENT: Normocephalic. Atraumatic. External ears normal, without discharge. No nasal discharge. Oropharynx clear. Mucous membranes moist.  EYES: Conjunctiva non-injected. No scleral icterus. PERRL. EOM's grossly intact. NECK: Supple. Normal ROM. CARDIOVASCULAR: RRR. No Murmer. Intact distal pulses. PULMONARY/CHEST WALL: Effort normal. No tachypnea. Lungs clear to ausculation. ABDOMEN: Normal BS. Soft. Nondistended. No tenderness to palpate. No guarding. /ANORECTAL: Not assessed  MUSKULOSKELETAL: Normal ROM. No acute deformities. 3+ pitting edema bilateral LEs. No tenderness to palpate. SKIN: Warm and dry. No rash. NEUROLOGICAL: Alert and oriented x 3. GCS 15. CN II-XII grossly intact. Strength is 5/5 in all extremities and sensation is intact.    PSYCHIATRIC: Normal affect        DIAGNOSTICRESULTS:     LABS:    Results for orders placed or performed during the hospital encounter of 04/24/22   CBC with Auto Differential   Result Value Ref Range    WBC 6.9 4.0 - 11.0 K/uL    RBC 3.66 (L) 4.20 - 5.90 M/uL    Hemoglobin 10.1 (L) 13.5 - 17.5 g/dL    Hematocrit 31.9 (L) 40.5 - 52.5 %    MCV 87.2 80.0 - 100.0 fL    MCH 27.5 26.0 - 34.0 pg    MCHC 31.6 31.0 - 36.0 g/dL    RDW 18.9 (H) 12.4 - 15.4 %    Platelets 156 275 - 253 K/uL    MPV 8.9 5.0 - 10.5 fL    Neutrophils % 78.7 %    Lymphocytes % 10.0 %    Monocytes % 8.6 %    Eosinophils % 2.1 %    Basophils % 0.6 %    Neutrophils Absolute 5.5 1.7 - 7.7 K/uL    Lymphocytes Absolute 0.7 (L) 1.0 - 5.1 K/uL    Monocytes Absolute 0.6 0.0 - 1.3 K/uL    Eosinophils Absolute 0.1 0.0 - 0.6 K/uL    Basophils Absolute 0.0 0.0 - 0.2 K/uL   Comprehensive Metabolic Panel w/ Reflex to MG   Result Value Ref Range    Sodium 137 136 - 145 mmol/L    Potassium reflex Magnesium 4.7 3.5 - 5.1 mmol/L    Chloride 96 (L) 99 - 110 mmol/L    CO2 29 21 - 32 mmol/L    Anion Gap 12 3 - 16    Glucose 129 (H) 70 - 99 mg/dL    BUN 78 (H) 7 - 20 mg/dL    CREATININE 1.9 (H) 0.8 - 1.3 mg/dL    GFR Non-African American 35 (A) >60    GFR  43 (A) >60    Calcium 9.5 8.3 - 10.6 mg/dL    Total Protein 6.6 6.4 - 8.2 g/dL    Albumin 4.0 3.4 - 5.0 g/dL    Albumin/Globulin Ratio 1.5 1.1 - 2.2    Total Bilirubin 0.4 0.0 - 1.0 mg/dL    Alkaline Phosphatase 81 40 - 129 U/L    ALT 8 (L) 10 - 40 U/L    AST 17 15 - 37 U/L   Troponin   Result Value Ref Range    Troponin 0.05 (H) <0.01 ng/mL   Brain Natriuretic Peptide   Result Value Ref Range    Pro-BNP 4,308 (H) 0 - 124 pg/mL   Blood Gas, Venous   Result Value Ref Range    pH, Angel 7.392 7.350 - 7.450    pCO2, Angel 50.9 (H) 40.0 - 50.0 mmHg    pO2, Angel 49.4 (H) 25.0 - 40.0 mmHg    HCO3, Venous 30.3 (H) 23.0 - 29.0 mmol/L    Base Excess, Angel 4.4 (H) -3.0 - 3.0 mmol/L    O2 Sat, Angel 83 Not Established %    Carboxyhemoglobin 2.1 (H) 0.0 - 1.5 %    MetHgb, Angel 0.4 <1.5 %    TC02 (Calc), Angel 32 Not Established mmol/L    O2 Therapy Unknown    EKG 12 Lead   Result Value Ref Range    Ventricular Rate 65 BPM    Atrial Rate 66 BPM    QRS Duration 74 ms    Q-T Interval 406 ms    QTc Calculation (Bazett) 422 ms    R Axis -33 degrees    T Axis 115 degrees    Diagnosis       Junctional rhythmLeft axis deviationNonspecific ST and T wave abnormalityAbnormal ECGWhen compared with ECG of 09-APR-2022 11:32,Junctional rhythm has replaced Sinus rhythm         RADIOLOGY:  All x-ray studies areviewed/reviewed by me. Formal interpretations per the radiologist are as follows:      XR CHEST PORTABLE    Result Date: 3/31/2022  EXAMINATION: ONE XRAY VIEW OF THE CHEST 3/31/2022 8:18 pm COMPARISON: Chest radiograph 03/07/2022 and CT chest 01/13/2022 HISTORY: Short of breath FINDINGS: The cardiac silhouette is enlarged. Calcifications involving the aorta reflect atherosclerosis. The mediastinal and hilar silhouettes appear unremarkable. Vascular engorgement and cephalization is demonstrated with bilateral peribronchial cuffing and perivascular haziness. Possible right pleural effusion. No dense consolidation. Chronic appearing coarse interstitial densities predominate perihilar regions and lung bases, typical of sequela from smoking or other previous infectious/inflammatory process. No pneumothorax is seen. No acute osseous abnormality is identified. Stable sequela from open-heart surgery. 1. Nonspecific pulmonary opacities in the bilateral lungs can be seen with congestive heart failure on a background of smoking or atypical/viral pneumonia. Similar appearance to prior studies. 2. Calcific atherosclerosis aorta. 3. Cardiomegaly. EKG:     The Ekg interpreted by me in the absence of a cardiologist shows.    junctional rhythm with a rate of 65    See also interpretation by Albert Dickerson DO.      PROCEDURES:   N/A    CRITICAL CARE TIME:       Due to the immediate potential for life-threatening deterioration due to acute respiratory failure with hypoxia due to CHF exacerbation, I spent 34 minutes providing critical care. This time is excluding time spent performing procedures. CONSULTS:  IP CONSULT TO HOSPITALIST      EMERGENCY DEPARTMENT COURSE and DIFFERENTIAL DIAGNOSIS/MDM:   Vitals:    Vitals:    04/24/22 2011 04/24/22 2028 04/24/22 2111   BP:  (!) 115/50 (!) 146/76   Pulse: 71     Resp: 30     Temp: 98.1 °F (36.7 °C)     TempSrc: Oral     SpO2: 94%     Weight: (!) 317 lb (143.8 kg)     Height: 5' 9\" (1.753 m)         Patient was given the following medications:  Medications   furosemide (LASIX) injection 40 mg (has no administration in time range)   nitroglycerin (NITRO-BID) 2 % ointment 0.5 inch (has no administration in time range)         Patient was evaluated by both myself and Padmini Bowman DO. Old records were reviewed. This patient is here with increased shortness patient is fairly severe CHF and was hospitalized less than a month ago with an exacerbation. He required Lasix drip. He is requiring more oxygen than baseline today. He has increased swelling. He is sleeping upright in a recliner. Based on the history and on very review of his chart I ordered him a dose of IV Lasix and Nitropaste. Work-up initiated with chest x-ray, EKG, labs. Chest x-ray shows:   Prior sternotomy.  Marked cardiomegaly pulmonary vascular congestion and   trace effusions, most compatible with CHF.  Due to the degree of pulmonary   edema, underlying pneumonia could be hidden.  Slightly greater pulmonary   edema was noted when compared to 03/31/2022. EKG shows junctional rhythm rate 65 bpm  CBC white count 6.9 with H&H 10.1 and 31.9  CMP BUN is elevated at 78, creatinine 1.9 EGFR 35. Consistent with MARGARITO. Troponin 0.05  proBNP 4308  VBG normal pH 7.392    Ultimately, patient warrants hospitalization due to the increased oxygen requirement making him acute respiratory failure with hypoxia as well as a CHF exacerbation and MARGARITO.   We will talk with the hospitalist.  They may need to initiate a Lasix drip on this patient at some point. I spoke with Dr. Jailyn Wilkes. We thoroughly discussed the history, physical exam, laboratory and imaging studies, as well as, emergency department course. Based upon that discussion, we've decided to admit Don Poe for further observation and evaluation of Carole Townsend's acute respiratory failure hypoxia and CHF exacerbation. As I have deemed necessary from their history, physical, and studies, I have considered and evaluated Don Poe for the following diagnoses:  ACUTE CORONARY SYNDROME, PERICARDIAL TAMPONADE, CHF, SEPSIS, COPD EXACERBATION, PNEUMONIA, PNEUMOTHORAX, PULMONARY EMBOLISM, and THORACIC DISSECTION. FINAL IMPRESSION:      1. Acute respiratory failure with hypoxia ([facility]    2. Acute on chronic diastolic congestive heart failure ([facility]    3.  MARGARITO (acute kidney injury) [facility])          DISPOSITION/PLAN:   DISPOSITION Decision To Admit             (Please note thatportions of this note were completed with a voice recognition program.  Efforts were made to edit the dictations, but occasionally words are mis-transcribed.)    Thais Tovar PA-C (electronicallysigned)              Loi Brownma  04/24/22 [id number]

## 2022-04-25 NOTE — ED TRIAGE NOTES
Lives home alone. Feels swollen and SOB. Normally 3-4L n/c at home and couldn't catch his breath. Squad to ER. 83% when ambulating from stretcher to bed on 4L n/c. Currently 6L n/c. Per pt, has gained approximately 27 lbs of fluid in 1.5 wks. Wearing a wireless halter monitor. Uses a cane.

## 2022-04-25 NOTE — PROGRESS NOTES
Patient had two week holter monitor prior to admission. 2 weeks was finished yesterday 4/24 according to patient. Vi Santana with cardiology said to take monitor off and she will come to bedside to collect phone.

## 2022-04-25 NOTE — H&P
Hospital Medicine History & Physical      PCP: Claudina Osler, APRN - CNP    Date of Admission: 4/24/2022    Date of Service: Pt seen/examined on 4/25/2022 and Admitted to Inpatient with expected LOS greater than two midnights due to medical therapy. Chief Complaint: Shortness of breath, fluid overload      History Of Present Illness:    75 y.o. male who presented to [facility] with above complaints  Patient with PMH of CAD s/p CABG, diastolic CHF, CKD, HTN, DM 2, HLD, sleep apnea, pulmonary hypertension presented to the ED today with complaints of increasing shortness of breath. Has had to go up on his oxygen requirements at home up to 6 L, traditionally uses 3 to 4 L/min. Also reports increased weight gain and worsening leg swelling. Has had severe orthopnea sleeping upright in a recliner every night. Does have some chest heaviness. Feels like his abdomen is swollen. Has early satiety. Patient admits that since being discharged home he has been eating and drinking a lot more when he should not have been doing that. Patient reports increased dyspnea even with minimal activity. Denies any palpitations, nausea vomiting or abdominal pain. Reports he has been compliant with his diuretics at home. He was recently admitted to [facility] for CHF exacerbation earlier this month. He is a Orthodox and will not accept blood products. On discharge on 4/9/2022 he weighed 287 pounds, today he is weighing 316 pounds.     Past Medical History:          Diagnosis Date    Anemia     Angina     Arthritis     CAD (coronary artery disease)     [facility]est[facility] failure) [facility]     CKD (chronic kidney disease) stage 3, GFR 30-59 ml/min ([facility])     Clostridium difficile diarrhea 3/16/12; 2/29/12    positive stool toxin    Diabetes mellitus ([facility]     Diabetic neuropathy ([facility]     feet and legs    Disease of blood and blood forming organ     Dizziness     When he moves his head/ loses his balance  GERD (gastroesophageal reflux disease)     gastric ulcer    Headache     Hearing loss     Hyperlipidemia     Hypertension     Kidney stone 2002    Refusal of blood transfusions as patient is Jewish     Sleep apnea     Tinnitus     Venous ulcer ([address]     LLE    Wound, open 1/13/2012       Past Surgical History:          Procedure Laterality Date    CARDIAC SURGERY      Dec 27 2010, triple bypass    CHOLECYSTECTOMY  9/2011    HERNIA REPAIR  age2    OTHER SURGICAL HISTORY  11-16-11 REPAIR LOWER STERNAL INCISION, REMOVAL OF ONE STERNAL WIRE,    OTHER SURGICAL HISTORY  10/10/2014    phacoemulsification of cataract with intraocular lens implant left eye    PAIN MANAGEMENT PROCEDURE N/A 2/10/2022    MIDLINE CERVICAL SIX SEVEN EPIDURAL STEROID INJECTION SITE CONFIRMED BY FLUOROSCOPY performed by Dinesh Alvarez MD at [address] [facility],[address] ENDOSCOPY         Medications Prior to Admission:      Prior to Admission medications    Medication Sig Start Date End Date Taking?  Authorizing Provider   isosorbide mononitrate (IMDUR) 30 MG extended release tablet Take 0.5 tablets by mouth daily  Patient taking differently: Take 30 mg by mouth daily  4/10/22   Chloe Hamman, MD   insulin glargine (LANTUS SOLOSTAR) 100 UNIT/ML injection pen Inject 35 Units into the skin daily 4/9/22   Chloe Hamman, MD   carvedilol (COREG) 3.125 MG tablet Take 1 tablet by mouth 2 times daily (with meals) 4/9/22   Chloe Hamman, MD   insulin lispro (HUMALOG) 100 UNIT/ML injection vial Inject 10 Units into the skin 3 times daily (with meals) 4/9/22   Chloe Hamman, MD   torsemide [facility]) 100 MG tablet Take 1 tablet by mouth daily 4/10/22   Chloe Hamman, MD   polyethylene glycol (GLYCOLAX) 17 g packet Take 17 g by mouth 2 times daily 4/9/22 5/9/22  Chloe Hamman, MD   linaclotide Carlie Lard) 290 MCG CAPS capsule Take 1 capsule by mouth every morning (before breakfast) 4/10/22   Chloe Hamman, MD tamsulosin (FLOMAX) 0.4 MG capsule Take 0.4 mg by mouth Daily 12/30/21   Historical Provider, MD   oxyCODONE-acetaminophen (PERCOCET) 5-325 MG per tablet Take 1 tablet by mouth 4 times daily for 30 days. 4/26/22 5/26/22  Bettie Sandy MD   atorvastatin (LIPITOR) 80 MG tablet Take 1 tablet by mouth nightly 3/14/22   PATRICK Dodd CNP   pregabalin (LYRICA) 25 MG capsule Take 1 capsule by mouth 2 times daily for 1 day. 3/14/22 4/20/22  César Honeycutt MD   allopurinol (ZYLOPRIM) 300 MG tablet Take 1 tablet by mouth daily 3/15/22   César Honeycutt MD   naloxone 4 MG/0.1ML LIQD nasal spray 1 spray by Nasal route as needed for Opioid Reversal  Patient not taking: Reported on 4/1/2022 11/23/21   Bettie Sandy MD   acetaminophen (TYLENOL) 500 MG tablet Take 2 tablets by mouth 4 times daily as needed for Pain 9/17/20   PATRICK Ron CNP   hydrocortisone (ANUSOL-HC) 2.5 % rectal cream Place rectally 2 times daily. Patient not taking: Reported on 4/20/2022 8/7/19   Jordan PumpPATRICK CNP   senna (SENOKOT) 8.6 MG TABS tablet Take 1 tablet by mouth 2 times daily 7/8/19   PATRICK Medeiros CNP   docusate sodium (COLACE, DULCOLAX) 100 MG CAPS Take 100 mg by mouth 2 times daily 5/1/18   PATRICK Watts CNP   Compression Stockings MISC by Does not apply route 2 pair, knee high compression stockings, fitted/ zippered 6/15/16   PATRICK Yin CNP   silver sulfADIAZINE (SILVADENE) 1 % cream Apply to BL lower leg ulcers daily 2/9/16   Lilia Hollins MD   nitroGLYCERIN (NITROSTAT) 0.4 MG SL tablet Place 1 tablet under the tongue every 5 minutes as needed 8/17/15   PATRICK Yin CNP   fluticasone (FLONASE) 50 MCG/ACT nasal spray 1 spray by Nasal route nightly as needed. Historical Provider, MD   aspirin 81 MG chewable tablet Take 1 tablet by mouth daily.  1/11/13   Jose A Armas MD   ferrous sulfate 325 (65 FE) MG tablet Take 325 mg by mouth 2 times daily 12/22/10   Historical Provider, MD   omeprazole (PRILOSEC) 20 MG capsule Take 20 mg by mouth 2 times daily. 12/22/10   Historical Provider, MD       Allergies:  Amitriptyline, Celecoxib, Cymbalta [duloxetine hcl], Elavil [amitriptyline hcl], Gabapentin, and Nsaids    Social History:      The patient currently lives at home    TOBACCO:   reports that he quit smoking about 34 years ago. His smoking use included cigarettes. He has a 16.00 pack-year smoking history. He has never used smokeless tobacco.  ETOH:   reports no history of alcohol use. E-Cigarettes/Vaping Use     Questions Responses    E-Cigarette/Vaping Use Never User    Start Date     Passive Exposure     Quit Date     Counseling Given     Comments             Family History:   Positive as follows:        Problem Relation Age of Onset    Heart Disease Mother     Heart Disease Father     Cancer Father     Diabetes Brother     Diabetes Brother        REVIEW OF SYSTEMS COMPLETED:   Pertinent positives as noted in the HPI. All other systems reviewed and negative. PHYSICAL EXAM PERFORMED:    BP (!) 156/52   Pulse 64   Temp 98.1 °F (36.7 °C) (Oral)   Resp 18   Ht 5' 9\" (1.753 m)   Wt (!) 317 lb (143.8 kg)   SpO2 97%   BMI 46.81 kg/m²     General appearance: Very obese, no apparent distress, appears stated age and cooperative. HEENT:  Normal cephalic, atraumatic without obvious deformity. Pupils equal, round, and reactive to light. Extra ocular muscles intact. Conjunctivae/corneas clear. Neck: Supple, with full range of motion. JVP elevated. Trachea midline. Respiratory:  Normal respiratory effort. Clear to auscultation, bilaterally without Rales/Wheezes/Rhonchi. Cardiovascular:  Regular rate and rhythm with normal S1/S2 without murmurs, rubs or gallops. Abdomen: Soft, non-tender, distended with fluid thrill +, with normal bowel sounds. Musculoskeletal:  No clubbing, cyanosis  bilaterally. Full range of motion without deformity.   BLE edema 2+  Skin: Skin color, texture, turgor normal.  No rashes or lesions. Neurologic:  Neurovascularly intact without any focal sensory/motor deficits. Cranial nerves: II-XII intact, grossly non-focal.  Psychiatric:  Alert and oriented, thought content appropriate, normal insight  Capillary Refill: Brisk,3 seconds, normal  Peripheral Pulses: +2 palpable, equal bilaterally       Labs:     Recent Labs     04/24/22 2201   WBC 6.9   HGB 10.1*   HCT 31.9*        Recent Labs     04/24/22 2201      K 4.7   CL 96*   CO2 29   BUN 78*   CREATININE 1.9*   CALCIUM 9.5     Recent Labs     04/24/22 2201   AST 17   ALT 8*   BILITOT 0.4   ALKPHOS 81     No results for input(s): INR in the last 72 hours. Recent Labs     04/24/22 2201   TROPONINI 0.05*       Urinalysis:      Lab Results   Component Value Date    NITRU Negative 03/31/2022    WBCUA 0-2 11/05/2013    BACTERIA Rare 11/05/2013    RBCUA 0-2 11/05/2013    BLOODU Negative 03/31/2022    SPECGRAV 1.010 03/31/2022    GLUCOSEU Negative 03/31/2022    GLUCOSEU NEGATIVE 02/29/2012       Radiology:     CXR: I have reviewed the CXR with the following interpretation: Cardiomegaly, pulmonary vascular congestion, trace pleural effusions    EKG:  I have reviewed the EKG with the following interpretation: Seems like junctional rhythm, rate 65, no acute ischemic changes    XR CHEST PORTABLE   Final Result   Prior sternotomy. Marked cardiomegaly pulmonary vascular congestion and   trace effusions, most compatible with CHF. Due to the degree of pulmonary   edema, underlying pneumonia could be hidden. Slightly greater pulmonary   edema was noted when compared to 03/31/2022.              ASSESSMENT:PLAN:    Acute on chronic diastolic HF (heart failure)   Wt Readings from Last 5 Encounters:   04/25/22 (!) 317 lb (143.8 kg)   04/09/22 287 lb 9.6 oz (130.5 kg)   03/14/22 282 lb 9.6 oz (128.2 kg)   02/10/22 280 lb (127 kg)   01/17/22 282 lb 8 oz (128.1 kg)   30 pound weight gain in the last 2 weeks  BNP elevated 4308  CXR showing CHF changes  -Start Lasix gtt  -Consult cardiology/nephrology to assist with management  -Strict I's/O and daily weights  -1500 cc fluid restricted, low-sodium diet  -Last echo-in August 2021 showed LVEF 55%, elevated left ventricular diastolic pressures, moderate AS  -Heart failure nurse consult for patient education    CAD s/p CABG x3  Troponin 0.05   Likely due to CHF. Follow troponin trend  Last cath in 2012 showed widely patent grafts  Continue aspirin, statin, Imdur, Coreg    MARGARITO on CKD  Due to CHF, cardiorenal.  Consulted nephrology to assist with management, IV Lasix drip ordered  Daily renal panel    Acute on chronic hypoxemic respiratory failure  Currently on 6 L oxygen, usually on 3 to 4 L at home  Wean as tolerated to maintain sats >92%    Severe HENNA-resume home BiPAP    DM type II-controlled, A1c 8.5  resume home insulin regimen, added SSI with Accu-Cheks, hypoglycemia protocol     Hyperlipidemia-statin resumed     Morbid obesity BMI 53.77  Complicating assessment and treatment, placing patient at high risk for multiple co-morbidities as well as early death and contributing to the patient's presentation. Counseled on weight loss.     Hypertension-controlled, resume home medication regimen      Paroxysmal A. Fib -controlled on beta-blocker. Not on anticoagulation  [id number] score is 5 placing him at about 7% risk of stroke per year. Patient declines anticoagulation at this time     Anemia of chronic disease-Hb stable, monitor. Patient is a [employer]    DVT Prophylaxis: Subcu Lovenox  Diet: ADULT DIET; Regular; 4 carb choices (60 gm/meal)  Code Status: Full Code    PT/OT Eval Status: Ambulatory    Dispo -IP stay     Edd Mar MD    Thank you Eric Lucero APRN - CNP for the opportunity to be involved in this patient's care. If you have any questions or concerns please feel free to contact me at [phone].

## 2022-04-25 NOTE — CONSULTS
[address] [facility]   Cardiovascular Evaluation    PATIENT: Sidra Eric  DATE: 2022  MRN: [id number]  CSN: [id number]  : 1955    Primary Care Doctor/Referring provider: PATRICK Franklin CNP, Jose Maria Gordon MD     Reason for evaluation/Chief complaint:   Shortness of Breath (Hx CHF. Feels swollen & SOB. 3-4L @ home. Currently on 6L n/c. )      Subjective:    History of present illness on initial date of evaluation:   Sidra Eric is a 75 y.o. patient who presents for the evaluation of increasing shortness of breath and weight gain. The patient is well-known to our cardiovascular practice and has an extensive history of diastolic heart failure and associated cardio renal syndrome. Patient was most recently admitted to the hospital earlier this month and spent several days undergoing diuresis and management of his heart failure. He was discharged on a new regimen of oral diuretics. Unfortunately, patient states that over the past 3 to 4 days he noted increasing edema in his lower extremities and progressive shortness of breath. Patient states that he gets short of breath with minimal activity. Shortness of breath is associated with cough and increasing work of breathing. Due to these symptoms, the patient presented to the emergency room. He was seen and evaluated and diagnosed with acute decompensation of his chronic diastolic heart failure. He was admitted to the hospital and started on IV Lasix. Cardiology is asked to see the patient for further recommendations and management. Patient states that he was compliant with his diuretic regimen. He states that he is increasingly frustrated with the lack of his improvement.         Patient Active Problem List   Diagnosis    DM (diabetes mellitus) ([facility]    Coronary artery disease involving native coronary artery of native heart without angina pectoris    Anemia of chronic disease    Essential hypertension  Hyperkalemia    Chronic diastolic heart failure ([facility])    Pulmonary hypertension due to left ventricular diastolic dysfunction ([facility])    Morbid obesity ([facility])    S/P CABG x 3    DM2 (diabetes mellitus, type 2) ([facility])    Morbid obesity with BMI of 50.0-59.9, adult ([facility])    HLD (hyperlipidemia)    Cardiomyopathy ([facility])    Productive cough    Chronic pain    Severe obstructive sleep apnea    Obesity, Class III, BMI 40-49.9 (morbid obesity) ([facility])    Acute diastolic congestive heart failure ([facility])    Degeneration of lumbar or lumbosacral intervertebral disc    Displacement of lumbar intervertebral disc without myelopathy    Lumbosacral spondylosis without myelopathy    Lumbar facet arthropathy    Disc displacement, lumbar    Spinal stenosis of lumbar region    Mixed conductive and sensorineural hearing loss of left ear with restricted hearing of right ear    Tympanic membrane perforation, left    Chest pain    Cor pulmonale, chronic ([facility])    Pulmonary hypertension due to alveolar hypoventilation disorder ([facility])    Nonrheumatic aortic valve stenosis    Chronic neck pain    Dyspnea    Acute diastolic CHF (congestive heart failure) ([facility])    Cervical stenosis of spinal canal    Degeneration of cervical intervertebral disc    Cervical radiculitis    Acute on chronic diastolic CHF (congestive heart failure) ([facility])    Acute respiratory failure with hypoxia ([facility])    Acute on chronic renal insufficiency    PAF (paroxysmal atrial fibrillation) ([facility])    Acute diastolic HF (heart failure) ([address]         Cardiac Testing: I have reviewed the findings below.   EKG:  ECHO:   STRESS TEST:  CATH:  BYPASS:  VASCULAR:    Past Medical History:   has a past medical history of Anemia, Angina, Arthritis, CAD (coronary artery disease), CHF (congestive heart failure) ([address] CKD (chronic kidney disease) stage 3, GFR 30-59 ml/min ([facility]), Clostridium difficile diarrhea, Diabetes mellitus ([address] Diabetic neuropathy ([address] Disease of blood and blood forming organ, Dizziness, GERD (gastroesophageal reflux disease), Headache, Hearing loss, Hyperlipidemia, Hypertension, Kidney stone, Refusal of blood transfusions as patient is [employer], Sleep apnea, Tinnitus, Venous ulcer ([address] and Wound, open. Surgical History:   has a past surgical history that includes hernia repair (age2); Cholecystectomy (9/2011); other surgical history (11-16-11 REPAIR LOWER STERNAL INCISION, REMOVAL OF ONE STERNAL WIRE,); Upper gastrointestinal endoscopy; Cardiac surgery; other surgical history (10/10/2014); and Pain management procedure (N/A, 2/10/2022). Social History:   reports that he quit smoking about 34 years ago. His smoking use included cigarettes. He has a 16.00 pack-year smoking history. He has never used smokeless tobacco. He reports that he does not drink alcohol and does not use drugs. Family History:  No evidence for sudden cardiac death or premature CAD    Medications:  Reviewed and are listed in nursing record. and/or listed below  Outpatient Medications:  Prior to Admission medications    Medication Sig Start Date End Date Taking?  Authorizing Provider   isosorbide mononitrate (IMDUR) 30 MG extended release tablet Take 0.5 tablets by mouth daily  Patient taking differently: Take 30 mg by mouth daily  4/10/22   Sondra Islas MD   insulin glargine (LANTUS SOLOSTAR) 100 UNIT/ML injection pen Inject 35 Units into the skin daily 4/9/22   Sondra Islas MD   carvedilol (COREG) 3.125 MG tablet Take 1 tablet by mouth 2 times daily (with meals) 4/9/22   Sondra Islas MD   insulin lispro (HUMALOG) 100 UNIT/ML injection vial Inject 10 Units into the skin 3 times daily (with meals) 4/9/22   Sondra Islas MD   torsemide [facility]) 100 MG tablet Take 1 tablet by mouth daily 4/10/22   Sondra Islas MD   polyethylene glycol (GLYCOLAX) 17 g packet Take 17 g by mouth 2 times daily 4/9/22 5/9/22  Sondra Islas MD   linaclotide (LINZESS) 290 MCG CAPS capsule Take 1 capsule by mouth every morning (before breakfast) 4/10/22   Jose Guadalupe Keen MD   tamsulosin [facility]) 0.4 MG capsule Take 0.4 mg by mouth Daily 12/30/21   Historical Provider, MD   oxyCODONE-acetaminophen (PERCOCET) 5-325 MG per tablet Take 1 tablet by mouth 4 times daily for 30 days. 4/26/22 5/26/22  Richa Colorado MD   atorvastatin (LIPITOR) 80 MG tablet Take 1 tablet by mouth nightly 3/14/22   Dario PATRCIK Bowling CNP   pregabalin (LYRICA) 25 MG capsule Take 1 capsule by mouth 2 times daily for 1 day. 3/14/22 4/20/22  Daina Putnam MD   allopurinol (ZYLOPRIM) 300 MG tablet Take 1 tablet by mouth daily 3/15/22   Daina Putnam MD   naloxone 4 MG/0.1ML LIQD nasal spray 1 spray by Nasal route as needed for Opioid Reversal  Patient not taking: Reported on 4/1/2022 11/23/21   Rihca Colorado MD   acetaminophen (TYLENOL) 500 MG tablet Take 2 tablets by mouth 4 times daily as needed for Pain 9/17/20   PATRICK Bowling CNP   hydrocortisone (ANUSOL-HC) 2.5 % rectal cream Place rectally 2 times daily. Patient not taking: Reported on 4/20/2022 8/7/19   PATRICK Valerio CNP   senna (SENOKOT) 8.6 MG TABS tablet Take 1 tablet by mouth 2 times daily 7/8/19   PATRICK Medeiros CNP   docusate sodium (COLACE, DULCOLAX) 100 MG CAPS Take 100 mg by mouth 2 times daily 5/1/18   PATRICK Solorio CNP   Compression Stockings MISC by Does not apply route 2 pair, knee high compression stockings, fitted/ zippered 6/15/16   [facility] PATRICK Reno CNP   silver sulfADIAZINE (SILVADENE) 1 % cream Apply to BL lower leg ulcers daily 2/9/16   Loly Knapp MD   nitroGLYCERIN (NITROSTAT) 0.4 MG SL tablet Place 1 tablet under the tongue every 5 minutes as needed 8/17/15   Angelo Sites, APRN - CNP   fluticasone (FLONASE) 50 MCG/ACT nasal spray 1 spray by Nasal route nightly as needed.     Historical Provider, MD   aspirin 81 MG chewable tablet Take 1 tablet by mouth daily. 1/11/13   Carlos Larua MD   ferrous sulfate 325 (65 FE) MG tablet Take 325 mg by mouth 2 times daily  12/22/10   Historical Provider, MD   omeprazole (PRILOSEC) 20 MG capsule Take 20 mg by mouth 2 times daily. 12/22/10   Historical Provider, MD       In-patient schedule medications:   allopurinol  300 mg Oral Daily    aspirin  81 mg Oral Daily    atorvastatin  80 mg Oral Nightly    carvedilol  3.125 mg Oral BID WC    docusate sodium  100 mg Oral BID    ferrous sulfate  325 mg Oral BID    insulin glargine  35 Units SubCUTAneous Daily    insulin lispro  0-12 Units SubCUTAneous TID WC    insulin lispro  0-6 Units SubCUTAneous Nightly    isosorbide mononitrate  15 mg Oral Daily    pantoprazole  20 mg Oral BID AC    pregabalin  25 mg Oral BID    senna  1 tablet Oral BID    tamsulosin  0.4 mg Oral Daily    sodium chloride flush  5-40 mL IntraVENous 2 times per day    enoxaparin  30 mg SubCUTAneous BID         Infusion Medications:   dextrose      sodium chloride      furosemide (LASIX) 1mg/ml infusion 5 mg/hr (04/25/22 0148)         Allergies:  Amitriptyline, Celecoxib, Cymbalta [duloxetine hcl], Elavil [amitriptyline hcl], Gabapentin, and Nsaids     Review of Systems:   All 14 point review of symptoms completed. Pertinent positives identified in the HPI, all other review of symptoms findings as below.        Physical Examination:    [unfilled]  /65   Pulse 61   Temp 98.1 °F (36.7 °C) (Oral)   Resp 20   Ht 5' 9\" (1.753 m)   Wt (!) 317 lb (143.8 kg)   SpO2 93%   BMI 46.81 kg/m²    Weight: (!) 317 lb (143.8 kg)     Wt Readings from Last 3 Encounters:   04/25/22 (!) 317 lb (143.8 kg)   04/09/22 287 lb 9.6 oz (130.5 kg)   03/14/22 282 lb 9.6 oz (128.2 kg)       Intake/Output Summary (Last 24 hours) at 4/25/2022 1000  Last data filed at 4/25/2022 0912  Gross per 24 hour   Intake 30 ml   Output 450 ml   Net -420 ml     Physical Examination:    [unfilled]  /65   Pulse 61   Temp 98.1 °F (36.7 °C) (Oral)   Resp 20   Ht 5' 9\" (1.753 m)   Wt (!) 317 lb (143.8 kg)   SpO2 93%   BMI 46.81 kg/m²    Weight: (!) 317 lb (143.8 kg)     Wt Readings from Last 3 Encounters:   04/25/22 (!) 317 lb (143.8 kg)   04/09/22 287 lb 9.6 oz (130.5 kg)   03/14/22 282 lb 9.6 oz (128.2 kg)       Intake/Output Summary (Last 24 hours) at 4/25/2022 1003  Last data filed at 4/25/2022 0912  Gross per 24 hour   Intake 30 ml   Output 450 ml   Net -420 ml       General Appearance:  Alert, cooperative, mild distress, appears stated age   Head:  Normocephalic, without obvious abnormality, atraumatic   Eyes:  PERRL, conjunctiva/corneas clear       Nose: Nares normal, no drainage or sinus tenderness   Throat: Lips, mucosa, and tongue normal   Neck: Supple, symmetrical, trachea midline, no adenopathy, thyroid: not enlarged, symmetric, no tenderness/mass/nodules, no carotid bruit. + JVD       Lungs:   Reduced to auscultation with rales bilaterally, respirations mildly labored   Chest Wall:  No tenderness or deformity   Heart:  Regular rhythm and normal rate; S1, S2 are normal; no murmur noted; no rub or gallop   Abdomen:   Soft, non-tender, bowel sounds active all four quadrants,  no masses, no organomegaly           Extremities: Extremities atraumatic, no cyanosis. + 2 edema   Pulses: 2+ and symmetric   Skin: Skin color, texture, turgor normal, no rashes or lesions   Pysch: Normal mood and affect   Neurologic: Normal gross motor and sensory exam.             Labs  Recent Labs     04/24/22 2201   WBC 6.9   HGB 10.1*   HCT 31.9*   MCV 87.2        Recent Labs     04/24/22 2201   CREATININE 1.9*   BUN 78*      K 4.7   CL 96*   CO2 29     No results for input(s): INR, PROTIME in the last 72 hours. Recent Labs     04/24/22 2201   TROPONINI 0.05*     Invalid input(s): PRO-BNP  No results for input(s): CHOL, LDL, HDL in the last 72 hours.     Invalid input(s): TG      Imaging:  I have reviewed the below testing personally and my interpretation is below. [id number]     CXR:    Normal sinus rhythmBaseline artifactLeft axis deviationPulmonary disease patternNonspecific       Assessment:  75 y.o. patient with:   Acute diastolic HF (heart failure) (HCC)  Elevated troponin  CKD  Essential hypertension   Obesity    Plan:  1. Continue lasix infusion @ 5mg/hour   ~limitations by renal function  2. Nephrology consultation to assist with diuretics   ~should we consider ultrafiltration? 3. CHF education reinforced. ~salt restriction   ~fluid restriction   ~medication compliance   ~daily weights and notify of any significant weight gain/loss   ~establish with CHF nurse  4. Advanced heart failure team to consult for additional management options. 5.  Elevated troponin levels are due to poor renal clearance and not reflective of myocardial ischemia. Medical Decision Making: The following items were considered in medical decision making:  Independent review of images  Review / order clinical lab tests  Review / order radiology tests  Decision to obtain old records  Review and summation of old records as accessed through [employer] (a summary of my findings in these old records)      Due to the high probability of clinically significant life threating deterioration of the patient's condition that required my urgent intervention, a total critical care time 45 minutes was used. This time excludes any time that may have been spent performing procedures. This includes but not limited to vital sign monitoring, telemetry monitoring, continuous pulse oximety, IV medication, clinical response to the IV medications, documentation time , consultation time, interpretation of lab data, review of nursing notes and old record review. All questions and concerns were addressed to the patient/family. Alternatives to my treatment were discussed. The note was completed using EMR.  Every effort was made to ensure accuracy; however, inadvertent computerized transcription errors may be present.     Bushra Ross MD, [address], [facility] [place], [place]  [phone] [place] office  [phone] [facility]  4/25/2022  10:00 AM

## 2022-04-25 NOTE — PLAN OF CARE
Problem: Pain  Goal: Verbalizes/displays adequate comfort level or baseline comfort level  Outcome: Progressing  Note: Pt will be satisfied with pain control. Pt uses numeric pain rating scale with reassessments after pain med administration. Will continue to monitor progression throughout shift. Problem: Safety - Adult  Goal: Free from fall injury  Outcome: Not Progressing  Note: Pt will remain free from falls throughout hospital stay. Fall precautions in place, bed in lowest position with wheels locked and side rails 2/4 up. Room door open and hourly rounding completed. Patient is refusing alarms at this time. Will continue to monitor throughout shift. Problem: Chronic Conditions and Co-morbidities  Goal: Patient's chronic conditions and co-morbidity symptoms are monitored and maintained or improved  Outcome: Progressing  Note: Pt will have accuchecks before meals and at bedtime with sliding scale insulin in place for coverage and lantus. Will continue to monitor for signs and symptoms of hypoglycemia and hyperglycemia throughout shift.

## 2022-04-25 NOTE — PROGRESS NOTES
4 Eyes Skin Assessment     The patient is being assess for  Admission    I agree that 2 RN's have performed a thorough Head to Toe Skin Assessment on the patient. ALL assessment sites listed below have been assessed. Areas assessed by both nurses: Debora/Kolby  [x]   Head, Face, and Ears   [x]   Shoulders, Back, and Chest  [x]   Arms, Elbows, and Hands   [x]   Coccyx, Sacrum, and Ischum  [x]   Legs, Feet, and Heels        Does the Patient have Skin Breakdown? Yes a wound was noted on the Admission Assessment and an WOUND LDA was Initiated documentation include the Radha-wound, Wound Assessment, Measurements, Dressing Treatment, Drainage, and Color\",         John Prevention initiated:  Yes   Wound Care Orders initiated:  Yes      [id number]  nurse consulted for Pressure Injury (Stage 3,4, Unstageable, DTI, NWPT, and Complex wounds):  No    Multiple wounds noted on lower bilateral extremities and toes;  Swelling bilateral lower extremities     Nurse 1 eSignature: Electronically signed by Juno Clemens RN on 4/25/22 at 5:30 AM EDT    **SHARE this note so that the co-signing nurse is able to place an eSignature**    Nurse 2 eSignature: Electronically signed by Henrry Darby RN on 4/25/22 at 5:32 AM EDT

## 2022-04-25 NOTE — CONSULTS
Nutrition Education    Consulted for CHF diet education. Provided pt with written and verbal instruction on HF nutrition therapy. Discussed low sodium diet, daily weights, and fluid restriction. Pt eating multiple meals on wheels entrees at one time d/t feeling hungry after one meal. States he continues to eat his high sodium sandwiches, after being provided with substitutions on previous visit. Denies reading nutrition labels for sodium content. Concerns for increased fluid intakes at home. Handout left at bedside. Will monitor for further nutrition education needs. Time spent: 20 minutes      · Educated on CHF  · Learners: Patient  · Readiness: Acceptance  · Method: Explanation and Handout  · Response: Verbalizes Understanding  · Contact name and number provided.     Viral Headley MS, RD, LD  Contact Number: [phone]

## 2022-04-25 NOTE — PROGRESS NOTES
Paged Dr. Jens Sutherland \"critical result BUN 85, lasix drip running now. nephrology consulted.  Thanks\"

## 2022-04-25 NOTE — CONSULTS
[facility] Consult Note           Reason for Consult:  Acute on chronic kidney disease stage 3  Requesting Physician:  Dr. Amena Holt    Chief Complaint:  Shortness of breath  History Obtained From:  patient, electronic medical record    History of Present Ilness:    75year old male with CKD, diastolic CHF, HTN, CAD s/p CABG admitted with worsening shortness of breath, fluid overload. We have been asked to assist in further CKD, diuretic mgmt. Has gained ~30 pounds since recent discharge. States the torsemide that he was discharged on has not really worked. He admits to excessive intake of fluids, worsening shortness of breath with leg swelling and tenderness with clear drainage. +weak, intake adequate, no fevers.     Past Medical History:        Diagnosis Date    Anemia     Angina     Arthritis     CAD (coronary artery disease)     [facility] failure) [facility])     CKD (chronic kidney disease) stage 3, GFR 30-59 ml/min ([facility])     Clostridium difficile diarrhea 3/16/12; 2/29/12    positive stool toxin    Diabetes mellitus ([address]     Diabetic neuropathy ([address]     feet and legs    Disease of blood and blood forming organ     Dizziness     When he moves his head/ loses his balance    GERD (gastroesophageal reflux disease)     gastric ulcer    Headache     Hearing loss     Hyperlipidemia     Hypertension     Kidney stone 2002    Refusal of blood transfusions as patient is Sikhism     Sleep apnea     Tinnitus     Venous ulcer ([address]     LLE    Wound, open 1/13/2012       Past Surgical History:        Procedure Laterality Date    CARDIAC SURGERY      Dec 27 2010, triple bypass    CHOLECYSTECTOMY  9/2011    HERNIA REPAIR  age2    OTHER SURGICAL HISTORY  11-16-11 REPAIR LOWER STERNAL INCISION, REMOVAL OF ONE STERNAL WIRE,    OTHER SURGICAL HISTORY  10/10/2014    phacoemulsification of cataract with intraocular lens implant left eye  PAIN MANAGEMENT PROCEDURE N/A 2/10/2022    MIDLINE CERVICAL SIX SEVEN EPIDURAL STEROID INJECTION SITE CONFIRMED BY FLUOROSCOPY performed by Jorje Gill MD at [address] Medications:    No current facility-administered medications on file prior to encounter. Current Outpatient Medications on File Prior to Encounter   Medication Sig Dispense Refill    isosorbide mononitrate (IMDUR) 30 MG extended release tablet Take 0.5 tablets by mouth daily (Patient taking differently: Take 30 mg by mouth daily ) 30 tablet 3    insulin glargine (LANTUS SOLOSTAR) 100 UNIT/ML injection pen Inject 35 Units into the skin daily 5 pen 3    carvedilol (COREG) 3.125 MG tablet Take 1 tablet by mouth 2 times daily (with meals) 60 tablet 3    insulin lispro (HUMALOG) 100 UNIT/ML injection vial Inject 10 Units into the skin 3 times daily (with meals) 10 mL 0    torsemide (DEMADEX) 100 MG tablet Take 1 tablet by mouth daily 30 tablet 3    polyethylene glycol (GLYCOLAX) 17 g packet Take 17 g by mouth 2 times daily 527 g 1    linaclotide (LINZESS) 290 MCG CAPS capsule Take 1 capsule by mouth every morning (before breakfast) 30 capsule 1    tamsulosin (FLOMAX) 0.4 MG capsule Take 0.4 mg by mouth Daily      [START ON 4/26/2022] oxyCODONE-acetaminophen (PERCOCET) 5-325 MG per tablet Take 1 tablet by mouth 4 times daily for 30 days. 120 tablet 0    atorvastatin (LIPITOR) 80 MG tablet Take 1 tablet by mouth nightly 30 tablet 3    pregabalin (LYRICA) 25 MG capsule Take 1 capsule by mouth 2 times daily for 1 day.  2 capsule 0    allopurinol (ZYLOPRIM) 300 MG tablet Take 1 tablet by mouth daily 30 tablet 0    naloxone 4 MG/0.1ML LIQD nasal spray 1 spray by Nasal route as needed for Opioid Reversal (Patient not taking: Reported on 4/1/2022) 1 each 0    acetaminophen (TYLENOL) 500 MG tablet Take 2 tablets by mouth 4 times daily as needed for Pain 60 tablet 0    hydrocortisone (ANUSOL-HC) 2.5 % rectal cream Place rectally 2 times daily. (Patient not taking: Reported on 2022) 1 Tube 0    senna (SENOKOT) 8.6 MG TABS tablet Take 1 tablet by mouth 2 times daily 120 tablet 0    docusate sodium (COLACE, DULCOLAX) 100 MG CAPS Take 100 mg by mouth 2 times daily      Compression Stockings MISC by Does not apply route 2 pair, knee high compression stockings, fitted/ zippered 2 each 1    silver sulfADIAZINE (SILVADENE) 1 % cream Apply to BL lower leg ulcers daily 20 g 0    nitroGLYCERIN (NITROSTAT) 0.4 MG SL tablet Place 1 tablet under the tongue every 5 minutes as needed 25 tablet 1    fluticasone (FLONASE) 50 MCG/ACT nasal spray 1 spray by Nasal route nightly as needed.  aspirin 81 MG chewable tablet Take 1 tablet by mouth daily. 30 tablet     ferrous sulfate 325 (65 FE) MG tablet Take 325 mg by mouth 2 times daily       omeprazole (PRILOSEC) 20 MG capsule Take 20 mg by mouth 2 times daily.          Allergies:  Amitriptyline, Celecoxib, Cymbalta [duloxetine hcl], Elavil [amitriptyline hcl], Gabapentin, and Nsaids    Social History:    Social History     Socioeconomic History    Marital status: Single     Spouse name: Not on file    Number of children: Not on file    Years of education: Not on file    Highest education level: Not on file   Occupational History    Not on file   Tobacco Use    Smoking status: Former Smoker     Packs/day: 1.00     Years: 16.00     Pack years: 16.00     Types: Cigarettes     Quit date: 1/10/1988     Years since quittin.3    Smokeless tobacco: Never Used    Tobacco comment: 22 yrs ago   Vaping Use    Vaping Use: Never used   Substance and Sexual Activity    Alcohol use: No     Alcohol/week: 0.0 standard drinks    Drug use: No    Sexual activity: Not on file   Other Topics Concern    Not on file   Social History Narrative    Not on file     Social Determinants of Health     Financial Resource Strain:     Difficulty of Paying Living Expenses: Not on file   Food Insecurity:     Worried About Running Out of Food in the Last Year: Not on file    James of Food in the Last Year: Not on file   Transportation Needs:     Lack of Transportation (Medical): Not on file    Lack of Transportation (Non-Medical): Not on file   Physical Activity:     Days of Exercise per Week: Not on file    Minutes of Exercise per Session: Not on file   Stress:     Feeling of Stress : Not on file   Social Connections:     Frequency of Communication with Friends and Family: Not on file    Frequency of Social Gatherings with Friends and Family: Not on file    Attends Scientology Services: Not on file    Active Member of [address] [place] [address] gumi or Organizations: Not on file    Attends Club or Organization Meetings: Not on file    Marital Status: Not on file   Intimate Partner Violence:     Fear of Current or Ex-Partner: Not on file    Emotionally Abused: Not on file    Physically Abused: Not on file    Sexually Abused: Not on file   Housing Stability:     Unable to Pay for Housing in the Last Year: Not on file    Number of [place] in the Last Year: Not on file    Unstable Housing in the Last Year: Not on file       Family History:   Family History   Problem Relation Age of Onset    Heart Disease Mother     Heart Disease Father     Cancer Father     Diabetes Brother     Diabetes Brother        Review of Systems:   Pertinent positives stated above in HPI. Remainder of 10 point review of systems were reviewed and were negative.     Physical exam:   Constitutional:  VITALS:  /68   Pulse 61   Temp 97.7 °F (36.5 °C) (Oral)   Resp 20   Ht 5' 9\" (1.753 m)   Wt (!) 317 lb (143.8 kg)   SpO2 98%   BMI 46.81 kg/m²   Gen: alert, awake, nad  Skin: no rash, turgor wnl  Heent:  eomi, mmm  Neck: no bruits or jvd noted, thyroid normal  Cardiovascular:  S1, S2 without m/r/g  Respiratory: CTA B without w/r/r; respiratory effort normal  Abdomen:  +bs, soft, nt, nd, no hepatosplenomegaly  Ext: +++ lower extremity edema with erythema and clear drainage  Psychiatric: mood and affect appropriate; judgement and insight intact  Musculoskeletal:  Rom, muscular strength limited; digits, nails normal    Data/  CBC:   Lab Results   Component Value Date    WBC 6.9 04/24/2022    RBC 3.66 04/24/2022    HGB 10.1 04/24/2022    HCT 31.9 04/24/2022    MCV 87.2 04/24/2022    MCH 27.5 04/24/2022    MCHC 31.6 04/24/2022    RDW 18.9 04/24/2022     04/24/2022    MPV 8.9 04/24/2022     BMP:    Lab Results   Component Value Date     04/25/2022    K 4.6 04/25/2022    CL 95 04/25/2022    CO2 29 04/25/2022    BUN 85 04/25/2022    LABALBU 4.0 04/24/2022    CREATININE 1.8 04/25/2022    CALCIUM 9.5 04/25/2022    GFRAA 46 04/25/2022    GFRAA 53 06/06/2013    LABGLOM 38 04/25/2022    GLUCOSE 245 04/25/2022         Assessment/    - Acute on chronic kidney disease stage 3              Worsening in setting of CHF decompensation, hx of volatile fluctuations in levels              based on volume status & diuretic use              Background DM Nephropathy + Cardiorenal syndrome              Baseline SCr mid 1 range, 1.5-1. 7              Follows with Dr. Collette Stade in office              Improving with diuresis in past                - dCHF exacerbation     - DM2 - poorly controlled     - Normocytic anemia, chronic - Hb at target     - Caodaism      Plan/    - Continue lasix drip - titrate to 10 mg/hour, add prn dose of metolazone tomorrow if not overall better  - Discussed possibility of ultrafiltration treatments for further fluid removal - patient not excited about the idea as he does not think he could do long-term  - Trend labs, bp's, & urine output    Overall prognosis guarded      Thank you for the consultation. Please do not hesitate to call with questions. ____________________________________  Fidelina Sylvester MD  [facility]  [email or web address]  Office: [phone]

## 2022-04-25 NOTE — ED PROVIDER NOTES
I independently performed a history and physical on [facility]. All diagnostic, treatment, and disposition decisions were made by myself in conjunction with the mid-level provider. For further details of Sarah Posada emergency department encounter, please see EYAD Damian's documentation. Patient presents to the emergency department complaining of shortness of breath. Patient normally uses 3 to 4 L of oxygen via nasal cannula. Patient has O2 sats down to to 83% with transition from stretcher to bed. Stable on 6 L with O2 sats of 94%. Patient reports lower extremity swelling and edema which are unchanged. He has reportedly gained 27 pounds of fluid in the last 1.5 weeks. Physical exam: General: Moderate severe respiratory distress. Morbid obesity. Neck: JVD noted. Heart: Regular rate and rhythm. Normal S1-S2. Lungs: Distant lung sounds. Abdomen: Soft. Protuberant but not distended. No rebound, guarding. Extremities: 4+ bilateral lower extremity edema. EKG: Junctional rhythm with a rate of 65. Left axis deviation. Normal intervals and durations. Nonspecific ST and T wave changes noted. EKG is essentially unchanged compared to previous EKG on 4/9/2022. LABS  I have reviewed all labs for this visit. Results for orders placed or performed during the hospital encounter of 04/24/22   EKG 12 Lead   Result Value Ref Range    Ventricular Rate 65 BPM    Atrial Rate 66 BPM    QRS Duration 74 ms    Q-T Interval 406 ms    QTc Calculation (Bazett) 422 ms    R Axis -33 degrees    T Axis 115 degrees    Diagnosis       Junctional rhythmLeft axis deviationNonspecific ST and T wave abnormalityAbnormal ECGWhen compared with ECG of 09-APR-2022 11:32,Junctional rhythm has replaced Sinus rhythm           RADIOLOGY  X-RAYS:  I have reviewed radiologic plain film image(s).   ALL OTHER NON-PLAIN FILM IMAGES SUCH AS CT, ULTRASOUND AND MRI HAVE BEEN READ BY THE RADIOLOGIST. XR CHEST PORTABLE   Final Result   Prior sternotomy. Marked cardiomegaly pulmonary vascular congestion and   trace effusions, most compatible with CHF. Due to the degree of pulmonary   edema, underlying pneumonia could be hidden. Slightly greater pulmonary   edema was noted when compared to 03/31/2022. Rechecks: Physical assessment performed. 2112: O2 sats 96% on oxygen. 2120: O2 sats 95%. 2150: O2 sats 95%. Critical Care: Critical care 35 minutes was complete outpatient in addition to and excluding any procedures noted secondary to concerns for respiratory failure with hypoxia secondary to CHF exacerbation. ED COURSE/MDM  Patient seen and evaluated. Old records reviewed. Labs and imaging reviewed and results discussed with patient. Patient was given supplemental oxygen and Lasix in the ED with good symptomatic relief. Patient was reassessed as noted above . Of note, patient was extremely unhappy during exam complaining of lower extremity pain and demanding that we place the rail down so he can sit up in bed. Given patient's quick desaturation with even minimal movement, I am not comfortable with patient sitting up in bed secondary to fall risk. Patient's request was declined. Patient will be admitted to hospital staff for further evaluation and treatment. Plan of care discussed with patient and family. Patient and family in agreement with plan. Patient was given scripts for the following medications. I counseled patient how to take these medications. New Prescriptions    No medications on file       CLINICAL IMPRESSION  1. Acute respiratory failure with hypoxia ([address]    2. Acute on chronic diastolic congestive heart failure ([address]    3. MARGARITO (acute kidney injury) ([address]        Blood pressure (!) 146/76, pulse 71, temperature 98.1 °F (36.7 °C), temperature source Oral, resp.  rate 30, height 5' 9\" (1.753 m), weight (!) 317 lb (143.8 kg), SpO2 94 %. DISPOSITION  Colin Clayton was admitted in stable condition.               Madeleine Gonzalez DO  04/24/22 2411

## 2022-04-25 NOTE — PLAN OF CARE
Patient's EF (Ejection Fraction) is greater than 40%    Heart Failure Medications:   Diuretics[de-identified] Furosemide     (One of the following REQUIRED for EF </= 40%/SYSTOLIC FAILURE but MAY be used in EF% >40%/DIASTOLIC FAILURE)        ACE[de-identified] None        ARB[de-identified] None         ARNI[de-identified] None    (Beta Blockers)   NON- Evidenced Based Beta Blocker (for EF% >40%/DIASTOLIC FAILURE): None     Evidenced Based Beta Blocker::(REQUIRED for EF% <40%/SYSTOLIC FAILURE) Carvedilol- Coreg  . .................................................................................................................................................. Patient's weights and intake/output reviewed: Yes    Patient's Last Weight: 317 lbs obtained by standing scale. Difference of 0 lbs  than last documented weight. Intake/Output Summary (Last 24 hours) at 4/25/2022 1107  Last data filed at 4/25/2022 1043  Gross per 24 hour   Intake 30 ml   Output 450 ml   Net -420 ml       Comorbidities Reviewed Yes    Patient has a past medical history of Anemia, Angina, Arthritis, CAD (coronary artery disease), CHF (congestive heart failure) ([address] CKD (chronic kidney disease) stage 3, GFR 30-59 ml/min ([facility]), Clostridium difficile diarrhea, Diabetes mellitus ([address] Diabetic neuropathy ([address] Disease of blood and blood forming organ, Dizziness, GERD (gastroesophageal reflux disease), Headache, Hearing loss, Hyperlipidemia, Hypertension, Kidney stone, Refusal of blood transfusions as patient is Gnosticism, Sleep apnea, Tinnitus, Venous ulcer ([address] and Wound, open. >>For CHF and Comorbidity documentation on Education Time and Topics, please see Education Tab    Progressive Mobility Assessment:  What is this patient's Current Level of Mobility?: Ambulatory-Up Ad Mami  How was this patient Mobilized today?: Edge of Bed, Up to Chair,  Up to Toilet/Shower and Up in Room, ambulated 20 ft                 With Whom?  OT                 Level of Difficulty/Assistance: Independent     Pt resting in bed at this time on 5 L O2. Pt denies shortness of breath. Pt with pitting lower extremity edema.      Patient and/or Family's stated Goal of Care this Admission: reduce shortness of breath, increase activity tolerance, better understand heart failure and disease management, be more comfortable and reduce lower extremity edema prior to discharge        :

## 2022-04-25 NOTE — ACP (ADVANCE CARE PLANNING)
Advance Care Planning     General Advance Care Planning (ACP) Conversation    Date of Conversation: 4/24/2022  Conducted with: Patient with Decision Making Capacity    Healthcare Decision Maker:    Primary Decision Maker: Urbano Flowers - [place] - [phone]  Click here to complete [address] including selection of the Healthcare Decision Maker Relationship (ie \"Primary\"). Today we documented Decision Maker(s) consistent with ACP documents on file. Content/Action Overview:   Has ACP document(s) on file - do NOT reflect the patient's care preferences, patient to provide new document(s)  Reviewed DNR/DNI and patient elects DNR order - referred to ACP Clinical Specialist & placed order        Length of Voluntary ACP Conversation in minutes:  <16 minutes (Non-Billable)    Debora Ruiz RN

## 2022-04-26 LAB
ANION GAP SERPL CALCULATED.3IONS-SCNC: 9 MMOL/L (ref 3–16)
BUN BLDV-MCNC: 84 MG/DL (ref 7–20)
CALCIUM SERPL-MCNC: 10.4 MG/DL (ref 8.3–10.6)
CHLORIDE BLD-SCNC: 95 MMOL/L (ref 99–110)
CO2: 32 MMOL/L (ref 21–32)
CREAT SERPL-MCNC: 1.8 MG/DL (ref 0.8–1.3)
GFR AFRICAN AMERICAN: 46
GFR NON-AFRICAN AMERICAN: 38
GLUCOSE BLD-MCNC: 241 MG/DL (ref 70–99)
GLUCOSE BLD-MCNC: 242 MG/DL (ref 70–99)
GLUCOSE BLD-MCNC: 259 MG/DL (ref 70–99)
GLUCOSE BLD-MCNC: 287 MG/DL (ref 70–99)
GLUCOSE BLD-MCNC: 313 MG/DL (ref 70–99)
MAGNESIUM: 2.8 MG/DL (ref 1.8–2.4)
PERFORMED ON: ABNORMAL
POTASSIUM SERPL-SCNC: 4.6 MMOL/L (ref 3.5–5.1)
SODIUM BLD-SCNC: 136 MMOL/L (ref 136–145)

## 2022-04-26 PROCEDURE — 97162 PT EVAL MOD COMPLEX 30 MIN: CPT

## 2022-04-26 PROCEDURE — 6370000000 HC RX 637 (ALT 250 FOR IP): Performed by: NURSE PRACTITIONER

## 2022-04-26 PROCEDURE — 36415 COLL VENOUS BLD VENIPUNCTURE: CPT

## 2022-04-26 PROCEDURE — 97530 THERAPEUTIC ACTIVITIES: CPT

## 2022-04-26 PROCEDURE — 6360000002 HC RX W HCPCS: Performed by: INTERNAL MEDICINE

## 2022-04-26 PROCEDURE — 80048 BASIC METABOLIC PNL TOTAL CA: CPT

## 2022-04-26 PROCEDURE — 1200000000 HC SEMI PRIVATE

## 2022-04-26 PROCEDURE — 2580000003 HC RX 258: Performed by: INTERNAL MEDICINE

## 2022-04-26 PROCEDURE — 97116 GAIT TRAINING THERAPY: CPT

## 2022-04-26 PROCEDURE — 97166 OT EVAL MOD COMPLEX 45 MIN: CPT

## 2022-04-26 PROCEDURE — 6370000000 HC RX 637 (ALT 250 FOR IP): Performed by: INTERNAL MEDICINE

## 2022-04-26 PROCEDURE — 83735 ASSAY OF MAGNESIUM: CPT

## 2022-04-26 PROCEDURE — 94761 N-INVAS EAR/PLS OXIMETRY MLT: CPT

## 2022-04-26 PROCEDURE — 2700000000 HC OXYGEN THERAPY PER DAY

## 2022-04-26 RX ORDER — METOLAZONE 2.5 MG/1
5 TABLET ORAL ONCE
Status: COMPLETED | OUTPATIENT
Start: 2022-04-26 | End: 2022-04-26

## 2022-04-26 RX ADMIN — PANTOPRAZOLE SODIUM 20 MG: 20 TABLET, DELAYED RELEASE ORAL at 16:58

## 2022-04-26 RX ADMIN — OXYCODONE AND ACETAMINOPHEN 1 TABLET: 5; 325 TABLET ORAL at 22:29

## 2022-04-26 RX ADMIN — METOLAZONE 5 MG: 2.5 TABLET ORAL at 14:34

## 2022-04-26 RX ADMIN — FERROUS SULFATE TAB 325 MG (65 MG ELEMENTAL FE) 325 MG: 325 (65 FE) TAB at 21:01

## 2022-04-26 RX ADMIN — ENOXAPARIN SODIUM 30 MG: 100 INJECTION SUBCUTANEOUS at 09:13

## 2022-04-26 RX ADMIN — PANTOPRAZOLE SODIUM 20 MG: 20 TABLET, DELAYED RELEASE ORAL at 05:46

## 2022-04-26 RX ADMIN — TAMSULOSIN HYDROCHLORIDE 0.4 MG: 0.4 CAPSULE ORAL at 05:46

## 2022-04-26 RX ADMIN — ATORVASTATIN CALCIUM 80 MG: 80 TABLET, FILM COATED ORAL at 21:01

## 2022-04-26 RX ADMIN — INSULIN LISPRO 4 UNITS: 100 INJECTION, SOLUTION INTRAVENOUS; SUBCUTANEOUS at 21:02

## 2022-04-26 RX ADMIN — SILVER SULFADIAZINE: 10 CREAM TOPICAL at 09:13

## 2022-04-26 RX ADMIN — INSULIN LISPRO 6 UNITS: 100 INJECTION, SOLUTION INTRAVENOUS; SUBCUTANEOUS at 12:44

## 2022-04-26 RX ADMIN — PREGABALIN 25 MG: 25 CAPSULE ORAL at 21:01

## 2022-04-26 RX ADMIN — OXYCODONE AND ACETAMINOPHEN 1 TABLET: 5; 325 TABLET ORAL at 16:58

## 2022-04-26 RX ADMIN — FERROUS SULFATE TAB 325 MG (65 MG ELEMENTAL FE) 325 MG: 325 (65 FE) TAB at 09:13

## 2022-04-26 RX ADMIN — CARVEDILOL 3.12 MG: 3.12 TABLET, FILM COATED ORAL at 16:58

## 2022-04-26 RX ADMIN — CARVEDILOL 3.12 MG: 3.12 TABLET, FILM COATED ORAL at 09:19

## 2022-04-26 RX ADMIN — PREGABALIN 25 MG: 25 CAPSULE ORAL at 09:12

## 2022-04-26 RX ADMIN — ENOXAPARIN SODIUM 30 MG: 100 INJECTION SUBCUTANEOUS at 21:02

## 2022-04-26 RX ADMIN — INSULIN LISPRO 4 UNITS: 100 INJECTION, SOLUTION INTRAVENOUS; SUBCUTANEOUS at 17:24

## 2022-04-26 RX ADMIN — FUROSEMIDE 10 MG/HR: 10 INJECTION INTRAMUSCULAR; INTRAVENOUS at 17:00

## 2022-04-26 RX ADMIN — OXYCODONE AND ACETAMINOPHEN 1 TABLET: 5; 325 TABLET ORAL at 00:36

## 2022-04-26 RX ADMIN — ISOSORBIDE MONONITRATE 15 MG: 30 TABLET, EXTENDED RELEASE ORAL at 09:12

## 2022-04-26 RX ADMIN — OXYCODONE AND ACETAMINOPHEN 1 TABLET: 5; 325 TABLET ORAL at 09:13

## 2022-04-26 RX ADMIN — DOCUSATE SODIUM 100 MG: 100 CAPSULE, LIQUID FILLED ORAL at 21:01

## 2022-04-26 RX ADMIN — SENNOSIDES 8.6 MG: 8.6 TABLET, COATED ORAL at 09:13

## 2022-04-26 RX ADMIN — OXYMETAZOLINE HCL 2 SPRAY: 0.05 SPRAY NASAL at 09:13

## 2022-04-26 RX ADMIN — DOCUSATE SODIUM 100 MG: 100 CAPSULE, LIQUID FILLED ORAL at 09:13

## 2022-04-26 RX ADMIN — INSULIN GLARGINE 35 UNITS: 100 INJECTION, SOLUTION SUBCUTANEOUS at 09:15

## 2022-04-26 RX ADMIN — MICONAZOLE NITRATE: 2 POWDER TOPICAL at 09:13

## 2022-04-26 RX ADMIN — SENNOSIDES 8.6 MG: 8.6 TABLET, COATED ORAL at 21:01

## 2022-04-26 RX ADMIN — MICONAZOLE NITRATE: 2 POWDER TOPICAL at 21:01

## 2022-04-26 RX ADMIN — ASPIRIN 81 MG 81 MG: 81 TABLET ORAL at 09:13

## 2022-04-26 RX ADMIN — SILVER SULFADIAZINE: 10 CREAM TOPICAL at 11:29

## 2022-04-26 RX ADMIN — ALLOPURINOL 300 MG: 300 TABLET ORAL at 09:13

## 2022-04-26 RX ADMIN — INSULIN LISPRO 4 UNITS: 100 INJECTION, SOLUTION INTRAVENOUS; SUBCUTANEOUS at 09:14

## 2022-04-26 ASSESSMENT — PAIN SCALES - GENERAL
PAINLEVEL_OUTOF10: 8
PAINLEVEL_OUTOF10: 6
PAINLEVEL_OUTOF10: 7
PAINLEVEL_OUTOF10: 8

## 2022-04-26 ASSESSMENT — PAIN DESCRIPTION - LOCATION: LOCATION: LEG

## 2022-04-26 NOTE — PROGRESS NOTES
[facility] Progress Note           Subjective/   75y.o. year old male who we are seeing in consultation for MARGARITO/CKD-3. HPI:  Renal function stable with diuresis, urine output of 1,850 ml over last two shifts. States still short of breath, oxygen being weaned. ROS:  Intake adequate, +weak. Objective/   GEN:  Chronically ill, BP (!) 103/49   Pulse 67   Temp 97.7 °F (36.5 °C) (Oral)   Resp 18   Ht 5' 9\" (1.753 m)   Wt (!) 321 lb 1.6 oz (145.7 kg)   SpO2 97%   BMI 47.42 kg/m²   HEENT: non-icteric, no JVD  CV: S1, S2 without m/r/g; +++ LE edema  RESP: CTA B without w/r/r; breathing wnl  ABD: +bs, soft, nt, no hsm  SKIN: warm, no rashes    Data/  Recent Labs     04/24/22  2201   WBC 6.9   HGB 10.1*   HCT 31.9*   MCV 87.2        Recent Labs     04/24/22  2201 04/25/22  0826 04/26/22  0743    136 136   K 4.7 4.6 4.6   CL 96* 95* 95*   CO2 29 29 32   GLUCOSE 129* 245* 259*   MG  --   --  2.80*   BUN 78* 85* 84*   CREATININE 1.9* 1.8* 1.8*   LABGLOM 35* 38* 38*   GFRAA 43* 46* 46*       Assessment/     - Acute on chronic kidney disease stage 3              Worsening in setting of CHF decompensation, hx of volatile fluctuations in levels              based on volume status & diuretic use              Background DM Nephropathy + Cardiorenal syndrome              Baseline SCr mid 1 range, 1.5-1. 7              Follows with Dr. Tor Valdes in office              Improving with diuresis in past                - dCHF exacerbation     - DM2 - poorly controlled     - Normocytic anemia, chronic - Hb at target     - Lutheran    Plan/     - Continue lasix drip - titrated to 10 mg/hour, add prn dose of metolazone today  - Discussed possibility of ultrafiltration treatments for further fluid removal - patient not excited about the idea as he does not think he could do long-term  - Trend labs, bp's, & urine output     Overall prognosis ricardo    ____________________________________  Fidelina Sylvester MD  [facility]  [email or web address]  Office: [phone]

## 2022-04-26 NOTE — PROGRESS NOTES
Patient's EF (Ejection Fraction) is greater than 40%    Heart Failure Medications:   Diuretics[de-identified] Furosemide     (One of the following REQUIRED for EF </= 40%/SYSTOLIC FAILURE but MAY be used in EF% >40%/DIASTOLIC FAILURE)        ACE[de-identified] None        ARB[de-identified] None         ARNI[de-identified] None    (Beta Blockers)   NON- Evidenced Based Beta Blocker (for EF% >40%/DIASTOLIC FAILURE): None     Evidenced Based Beta Blocker::(REQUIRED for EF% <40%/SYSTOLIC FAILURE) None  . .................................................................................................................................................. Patient's weights and intake/output reviewed: Yes    Patient's Last Weight: 317 lbs obtained by standing scale. Difference of 0 lbs same than last documented weight. Intake/Output Summary (Last 24 hours) at 4/26/2022 0340  Last data filed at 4/26/2022 0056  Gross per 24 hour   Intake 750 ml   Output 950 ml   Net -200 ml       Comorbidities Reviewed Yes    Patient has a past medical history of Anemia, Angina, Arthritis, CAD (coronary artery disease), CHF (congestive heart failure) ([address] CKD (chronic kidney disease) stage 3, GFR 30-59 ml/min ([facility]), Clostridium difficile diarrhea, Diabetes mellitus ([address] Diabetic neuropathy ([address] Disease of blood and blood forming organ, Dizziness, GERD (gastroesophageal reflux disease), Headache, Hearing loss, Hyperlipidemia, Hypertension, Kidney stone, Refusal of blood transfusions as patient is Anglican, Sleep apnea, Tinnitus, Venous ulcer ([address] and Wound, open. >>For CHF and Comorbidity documentation on Education Time and Topics, please see Education Tab    Progressive Mobility Assessment:  What is this patient's Current Level of Mobility?: Ambulatory- with Assistance  How was this patient Mobilized today?: Edge of Bed, Up to Chair,  Up to Toilet/Shower and Up in Room, ambulated 0 ft                 With Whom?  Nurse and PCA                 Level of Difficulty/Assistance: 1x Assist     Pt up in chair at this time on 4 L O2. Pt denies shortness of breath. Pt with pitting lower extremity edema.      Patient and/or Family's stated Goal of Care this Admission: reduce shortness of breath, increase activity tolerance, better understand heart failure and disease management, be more comfortable and reduce lower extremity edema prior to discharge        :

## 2022-04-26 NOTE — CONSULTS
[facility] Wound Ostomy Continence Nurse  Consult Note       NAME:  Katalina [facility]  MEDICAL RECORD NUMBER:  [id number]  AGE: 75 y.o. GENDER: male  : 1955  TODAY'S DATE:  2022    Subjective   My left leg has been hurting quite a bit lately. Reason for WOCN Evaluation and Assessment: venous ulcers      Shay Kelley is a 75 y.o. male referred by:   [x] Physician  [x] Nursing  [] Other:     Wound Identification:  Wound Type: Venous ulcers right and left lower legs. Traumatic wounds left 1st and 3rd dorsal toes now healed and dry (LDA completed). Contributing Factors: edema, venous stasis, diabetes, chronic pressure, decreased mobility, shear force and obesity    Wound History: 75 y. o. male who presented to [facility] with SOB. Patient with PMH of CAD s/p CABG, diastolic CHF, CKD, HTN, DM 2, HLD, sleep apnea, pulmonary hypertension presented to the ED with complaints of increasing shortness of breath. He had to go up on his oxygen requirements at home up to 6 L, traditionally uses 3 to 4 L/min. Also reports increased weight gain and worsening leg swelling.  Has had severe orthopnea sleeping upright in a recliner every night and increased dyspnea even with minimal activity.   He is a J[employer] and will not accept blood products. On discharge on 2022 he weighed 287 pounds, today he is weighing 316 pounds .     He is well known to wound care, last seen 22. He has gone to Wound care MD in the past but now he has a nurse that comes once a week to change his leg dressings. He has used unna boots in the past but reported after the home care places the boots his legs itch too much and it is difficult to leave the unna boots on. On his last admission he was being treated with silvadene cream.  The wound on his left lateral leg has not improved using this product.  Today he has venous ulcers bilaterally on has lower legs.     Current Wound Care Treatment:      Patient Goal of Care:  [x] Wound Healing  [] Odor Control  [] Palliative Care  [] Pain Control   [x] Other: edema control        PAST MEDICAL HISTORY        Diagnosis Date    Anemia     Angina     Arthritis     CAD (coronary artery disease)     CH[facility]congest[facility] failure) [facility]     CKD (chronic kidney disease) stage 3, GFR 30-59 ml/min ([facility])     Clostridium difficile diarrhea 3/16/12; 12    positive stool toxin    Diabetes mellitus ([address]     Diabetic neuropathy ([address]     feet and legs    Disease of blood and blood forming organ     Dizziness     When he moves his head/ loses his balance    GERD (gastroesophageal reflux disease)     gastric ulcer    Headache     Hearing loss     Hyperlipidemia     Hypertension     Kidney stone     Refusal of blood transfusions as patient is Gnosticism     Sleep apnea     Tinnitus     Venous ulcer ([address]     LLE    Wound, open 2012       PAST SURGICAL HISTORY    Past Surgical History:   Procedure Laterality Date    CARDIAC SURGERY      Dec 27 2010, triple bypass    CHOLECYSTECTOMY  2011    HERNIA REPAIR  age2    OTHER SURGICAL HISTORY  11-16-11 REPAIR LOWER STERNAL INCISION, REMOVAL OF ONE STERNAL WIRE,    OTHER SURGICAL HISTORY  10/10/2014    phacoemulsification of cataract with intraocular lens implant left eye    PAIN MANAGEMENT PROCEDURE N/A 2/10/2022    MIDLINE CERVICAL SIX SEVEN EPIDURAL STEROID INJECTION SITE CONFIRMED BY FLUOROSCOPY performed by Melissa Denton MD at [facility] OR    UPPER GASTROINTESTINAL ENDOSCOPY         FAMILY HISTORY    Family History   Problem Relation Age of Onset    Heart Disease Mother     Heart Disease Father     Cancer Father     Diabetes Brother     Diabetes Brother        SOCIAL HISTORY    Social History     Tobacco Use    Smoking status: Former Smoker     Packs/day: 1.00     Years: 16.00     Pack years: 16.00     Types: Cigarettes     Quit date: 1/10/1988     Years since quittin.3    Smokeless tobacco: Never Used    Tobacco comment: 22 yrs ago   Vaping Use    Vaping Use: Never used   Substance Use Topics    Alcohol use: No     Alcohol/week: 0.0 standard drinks    Drug use: No       ALLERGIES    Allergies   Allergen Reactions    Amitriptyline Other (See Comments)     tremor    Celecoxib      Hyperkalemia    Cymbalta [Duloxetine Hcl] Other (See Comments)     Tremors and slurred speech    Elavil [Amitriptyline Hcl]      tremor    Gabapentin      Tremor at high dose    Nsaids      Gastric ulcer       MEDICATIONS    No current facility-administered medications on file prior to encounter. Current Outpatient Medications on File Prior to Encounter   Medication Sig Dispense Refill    isosorbide mononitrate (IMDUR) 30 MG extended release tablet Take 0.5 tablets by mouth daily (Patient taking differently: Take 30 mg by mouth daily ) 30 tablet 3    insulin glargine (LANTUS SOLOSTAR) 100 UNIT/ML injection pen Inject 35 Units into the skin daily 5 pen 3    carvedilol (COREG) 3.125 MG tablet Take 1 tablet by mouth 2 times daily (with meals) 60 tablet 3    insulin lispro (HUMALOG) 100 UNIT/ML injection vial Inject 10 Units into the skin 3 times daily (with meals) 10 mL 0    torsemide (DEMADEX) 100 MG tablet Take 1 tablet by mouth daily 30 tablet 3    polyethylene glycol (GLYCOLAX) 17 g packet Take 17 g by mouth 2 times daily 527 g 1    linaclotide (LINZESS) 290 MCG CAPS capsule Take 1 capsule by mouth every morning (before breakfast) 30 capsule 1    tamsulosin (FLOMAX) 0.4 MG capsule Take 0.4 mg by mouth Daily      oxyCODONE-acetaminophen (PERCOCET) 5-325 MG per tablet Take 1 tablet by mouth 4 times daily for 30 days. 120 tablet 0    atorvastatin (LIPITOR) 80 MG tablet Take 1 tablet by mouth nightly 30 tablet 3    pregabalin (LYRICA) 25 MG capsule Take 1 capsule by mouth 2 times daily for 1 day.  2 capsule 0    allopurinol (ZYLOPRIM) 300 MG tablet Take 1 tablet by mouth daily 30 tablet 0    naloxone 4 MG/0.1ML LIQD nasal spray 1 spray by Nasal route as needed for Opioid Reversal (Patient not taking: Reported on 4/1/2022) 1 each 0    acetaminophen (TYLENOL) 500 MG tablet Take 2 tablets by mouth 4 times daily as needed for Pain 60 tablet 0    hydrocortisone (ANUSOL-HC) 2.5 % rectal cream Place rectally 2 times daily. (Patient not taking: Reported on 4/20/2022) 1 Tube 0    senna (SENOKOT) 8.6 MG TABS tablet Take 1 tablet by mouth 2 times daily 120 tablet 0    docusate sodium (COLACE, DULCOLAX) 100 MG CAPS Take 100 mg by mouth 2 times daily      Compression Stockings MISC by Does not apply route 2 pair, knee high compression stockings, fitted/ zippered 2 each 1    silver sulfADIAZINE (SILVADENE) 1 % cream Apply to BL lower leg ulcers daily 20 g 0    nitroGLYCERIN (NITROSTAT) 0.4 MG SL tablet Place 1 tablet under the tongue every 5 minutes as needed 25 tablet 1    fluticasone (FLONASE) 50 MCG/ACT nasal spray 1 spray by Nasal route nightly as needed.  aspirin 81 MG chewable tablet Take 1 tablet by mouth daily. 30 tablet     ferrous sulfate 325 (65 FE) MG tablet Take 325 mg by mouth 2 times daily       omeprazole (PRILOSEC) 20 MG capsule Take 20 mg by mouth 2 times daily. Objective  Sitting on the side of the bed. In bed for evaluation but 1/2 way through the evaluation he had to get up to chair as he was C/O SOB. Voiding clear yellow urine in bucket next to bed.     BP (!) 103/49   Pulse 67   Temp 97.7 °F (36.5 °C) (Oral)   Resp 18   Ht 5' 9\" (1.753 m)   Wt (!) 321 lb 1.6 oz (145.7 kg)   SpO2 97%   BMI 47.42 kg/m²     LABS:  WBC:    Lab Results   Component Value Date    WBC 6.9 04/24/2022     H/H:    Lab Results   Component Value Date    HGB 10.1 04/24/2022    HCT 31.9 04/24/2022     PTT:    Lab Results   Component Value Date    APTT 39.2 03/31/2022   [APTT}  PT/INR:    Lab Results   Component Value Date    PROTIME 14.7 03/31/2022    INR 1.29 03/31/2022     HgBA1c:    Lab Results   Component Value Date    LABA1C 7.0 04/01/2022       Assessment  Left lateral lower leg wound with some small deflated blood blisters in the wound bed. He has edema in both legs. Wounds are pale pink in clusters.   See photo's   John Risk Score: John Scale Score: 20    Patient Active Problem List   Diagnosis    [facility]    Coronary artery disease involving native coronary artery of native heart without angina pectoris    Anemia of chronic disease    Essential hypertension    Hyperkalemia    Chronic diastolic heart failure ([address]    Pulmonary hypertension due to left ventricular diastolic dysfunction ([address]    Morbid obesity ([address]    S/P CABG x 3    DM2 (diabetes mellitus, type 2) ([facility])    Morbid obesity with BMI of 50.0-59.9, adult ([facility])    HLD (hyperlipidemia)    Cardiomyopathy [facility]    Productive cough    Chronic pain    Severe obstructive sleep apnea    Obesity, Class III, BMI 40-49.9 (morbid obesity) ([facility])    Acute diastolic congestive heart failure ([facility])    Degeneration of lumbar or lumbosacral intervertebral disc    Displacement of lumbar intervertebral disc without myelopathy    Lumbosacral spondylosis without myelopathy    Lumbar facet arthropathy    Disc displacement, lumbar    Spinal stenosis of lumbar region    Mixed conductive and sensorineural hearing loss of left ear with restricted hearing of right ear    Tympanic membrane perforation, left    Chest pain    Cor pulmonale, chronic ([facility])    Pulmonary hypertension due to alveolar hypoventilation disorder ([facility])    Nonrheumatic aortic valve stenosis    Chronic neck pain    Dyspnea    Acute diastolic CHF (congestive heart failure) ([facility])    Cervical stenosis of spinal canal    Degeneration of cervical intervertebral disc    Cervical radiculitis    Acute on chronic diastolic CHF (congestive heart failure) ([facility])    Acute respiratory failure with hypoxia ([facility])    CKD (chronic kidney disease)    PAF (paroxysmal atrial fibrillation) ([facility])    Acute diastolic HF (heart failure) ([facility])    Elevated troponin       Measurements:  Wound 01/14/22 Leg Left; Lower; Lateral (Active)   Wound Image   04/26/22 1153   Wound Etiology Venous 04/26/22 1153   Dressing Status New dressing applied 04/26/22 1153   Wound Cleansed Cleansed with saline 04/26/22 1153   Dressing/Treatment Xeroform;Dry dressing;Roll gauze;ABD; Ace wrap 04/26/22 1153   Offloading for Diabetic Foot Ulcers Offloading ordered 04/26/22 1153   Dressing Change Due 04/27/22 04/26/22 1153   Wound Length (cm) 9.5 cm 04/26/22 1153   Wound Width (cm) 8 cm 04/26/22 1153   Wound Depth (cm) 0.1 cm 04/26/22 1153   Wound Surface Area (cm^2) 76 cm^2 04/26/22 1153   Change in Wound Size % (l*w) -744.44 04/26/22 1153   Wound Volume (cm^3) 7.6 cm^3 04/26/22 1153   Wound Healing % -744 04/26/22 1153   Distance Tunneling (cm) 0 cm 04/26/22 1153   Tunneling Position ___ O'Clock 0 04/26/22 1153   Undermining Starts ___ O'Clock 0 04/26/22 1153   Undermining Ends___ O'Clock 0 04/26/22 1153   Undermining Maxium Distance (cm) 0 04/26/22 1153   Wound Assessment Bleeding;Blood filled blister;Slough 04/26/22 1153   Drainage Amount Moderate 04/26/22 1153   Drainage Description Serous 04/26/22 1153   Odor None 04/26/22 1153   Radha-wound Assessment Dry/flaky;Fragile; Fluctulant; Other (Comment) 04/26/22 1153   Margins Attached edges; Defined edges 04/26/22 1153   Wound Thickness Description not for Pressure Injury Partial thickness 04/26/22 1153   Number of days: 101   Left lateral lower leg:                          Wound 04/04/22 Leg Right; Lower; Inner;Lateral cluster of 2 (Active)   Wound Image   04/26/22 1153   Wound Etiology Venous 04/26/22 1153   Dressing Status New dressing applied 04/26/22 1153   Wound Cleansed Cleansed with saline 04/26/22 1153   Dressing/Treatment Xeroform;Dry dressing;ABD;Roll gauze; Ace wrap 04/26/22 1153   Offloading for Diabetic Foot Ulcers Offloading ordered 04/26/22 1153 Dressing Change Due 04/27/22 04/26/22 1153   Wound Length (cm) 13 cm 04/26/22 1153   Wound Width (cm) 13 cm 04/26/22 1153   Wound Depth (cm) 0.1 cm 04/26/22 1153   Wound Surface Area (cm^2) 169 cm^2 04/26/22 1153   Change in Wound Size % (l*w) -350.67 04/26/22 1153   Wound Volume (cm^3) 16.9 cm^3 04/26/22 1153   Wound Healing % -351 04/26/22 1153   Distance Tunneling (cm) 0 cm 04/26/22 1153   Tunneling Position ___ O'Clock 0 04/26/22 1153   Undermining Starts ___ O'Clock 0 04/26/22 1153   Undermining Ends___ O'Clock 0 04/26/22 1153   Undermining Maxium Distance (cm) 0 04/26/22 1153   Wound Assessment Pink/red 04/26/22 1153   Drainage Amount Small 04/26/22 1153   Drainage Description Serosanguinous 04/26/22 1153   Odor None 04/26/22 1153   Radha-wound Assessment Ecchymosis;Fragile;Edematous 04/26/22 1153   Margins Attached edges; Defined edges 04/26/22 1153   Wound Thickness Description not for Pressure Injury Partial thickness 04/26/22 1153   Number of days: 21      Right lower lateral and inner leg:           Wound 04/26/22 Leg Left; Inner open in clusters (Active)   Wound Image   04/26/22 1153   Wound Etiology Venous 04/26/22 1153   Dressing Status New dressing applied 04/26/22 1153   Wound Cleansed Cleansed with saline 04/26/22 1153   Dressing/Treatment Xeroform;Dry dressing;Roll gauze;ABD; Ace wrap 04/26/22 1153   Offloading for Diabetic Foot Ulcers Offloading ordered 04/26/22 1153   Dressing Change Due 04/27/22 04/26/22 1153   Wound Length (cm) 14 cm 04/26/22 1153   Wound Width (cm) 7 cm 04/26/22 1153   Wound Depth (cm) 0.1 cm 04/26/22 1153   Wound Surface Area (cm^2) 98 cm^2 04/26/22 1153   Wound Volume (cm^3) 9.8 cm^3 04/26/22 1153   Distance Tunneling (cm) 0 cm 04/26/22 1153   Tunneling Position ___ O'Clock 0 04/26/22 1153   Undermining Starts ___ O'Clock 0 04/26/22 1153   Undermining Ends___ O'Clock 0 04/26/22 1153   Undermining Maxium Distance (cm) 0 04/26/22 1153   Wound Assessment Pink/red;Pale granulation tissue 04/26/22 1153   Drainage Amount Moderate 04/26/22 1153   Drainage Description Serous 04/26/22 1153   Odor None 04/26/22 1153   Radha-wound Assessment Ecchymosis;Edematous; Fluctulant; Hyperpigmented; Warm 04/26/22 1153   Margins Attached edges; Defined edges 04/26/22 1153   Wound Thickness Description not for Pressure Injury Partial thickness 04/26/22 1153   Number of days: 0      Left medial lower leg:        Response to treatment:  With complaints of pain. Pain Assessment:  Severity:  3 / 10  Quality of pain: dull, aching, burning  Wound Pain Timing/Severity: intermittent  Premedicated: No    Plan   Plan of Care: Wound 04/04/22 Leg Right; Lower; Inner;Lateral cluster of 2-Dressing/Treatment: Virginia Prows gauze,Ace wrap  Wound 01/14/22 Leg Left; Lower; Lateral-Dressing/Treatment: Marlys Matteo gauze,ABD,Ace wrap  Wound 04/26/22 Leg Left; Inner open in clusters-Dressing/Treatment: Marlys Matteo gauze,ABD,Ace wrap      Dr Mirna Packer here discussing kidney issues with patient. Updated Dr Juan Hernandez regarding increased venous ulcers on lower legs. Recommend follow up with wound care MD.    Recommend:  Clean lower legs with foamy cleanser or soap and water around wound beds. Clean wound beds with normal saline. Pat dry. Apply xeroform to open wound beds on right and left lower legs. Cover with dry dressing, abd pads, and secure with 2 ace wraps from toes to knees, then roll guaze. OK to remove roll guaze a night but please re-apply during day. Wound care to follow. Call wound care for deterioration [phone] or call [phone] and leave message. Recommend he return to wound care MD for follow up. Specialty Bed Required : Yes   [] Low Air Loss   [x] Pressure Redistribution Isoflex gel therapy pressure redistribution mattress in place. [] Fluid Immersion  [] Bariatric  [] Total Pressure Relief  [] Other:     Current Diet: ADULT DIET;  Regular; 4 carb choices (60 gm/meal); Low Sodium (2 gm); 1500 ml  Dietician consult:  Yes    Discharge Plan:  Placement for patient upon discharge: skilled nursing    Patient appropriate for Outpatient [address]: Yes    Referrals:  []  / discharge planner following - active w/[place] [facility] and [facility]. PT/OT ref made.   [place]   [] [facility] active  [] Supplies  [] Other    Patient/Caregiver Teaching: Reminded patient to keep legs elevated when in chair  Level of patient/caregiver understanding able to:   [] Indicates understanding       [x] Needs reinforcement  [] Unsuccessful      [] Verbal Understanding  [] Demonstrated understanding       [] No evidence of learning  [] Refused teaching         [] N/A       Electronically signed by Michael Herrera RN, MSN, Grace Amin on 4/26/2022 at 12:15 PM

## 2022-04-26 NOTE — PLAN OF CARE
Problem: Discharge Planning  Goal: Discharge to home or other facility with appropriate resources  Outcome: Progressing  Note: Pt will have accuchecks before meals and at bedtime with sliding scale insulin in place for coverage. Will continue to monitor for signs and symptoms of hypoglycemia and hyperglycemia throughout shift. Problem: Pain  Goal: Verbalizes/displays adequate comfort level or baseline comfort level  Outcome: Progressing  Note: Pt will be satisfied with pain control with PRN Q6 oxycodone. Pt uses numeric pain rating scale with reassessments after pain med administration. Will continue to monitor progression throughout shift. Problem: Safety - Adult  Goal: Free from fall injury  Outcome: Progressing  Note: Pt will remain free from falls throughout hospital stay. Fall precautions in place, patient is refusing bed alarm, bed in lowest position with wheels locked and side rails 2/4 up. Room door open and hourly rounding completed. Will continue to monitor throughout shift.        Problem: Chronic Conditions and Co-morbidities  Goal: Patient's chronic conditions and co-morbidity symptoms are monitored and maintained or improved  4/26/2022 0337 by See Roy RN  Outcome: Progressing

## 2022-04-26 NOTE — PROGRESS NOTES
Physical Therapy  Facility/Department: [facility] A2 CARD TELEMETRY  Physical Therapy Initial Assessment    Name: Caridad Cerrato  : 1955  MRN: [id number]  Date of Service: 2022    Discharge Recommendations:  Home with assist PRN,Home with Home health PT   PT Equipment Recommendations  Equipment Needed: No      Patient Diagnosis(es): The primary encounter diagnosis was Acute respiratory failure with hypoxia ([address] Diagnoses of Acute on chronic diastolic congestive heart failure (HCC) and MARGARITO (acute kidney injury) ([address] were also pertinent to this visit. Past Medical History:  has a past medical history of Anemia, Angina, Arthritis, CAD (coronary artery disease), CHF (congestive heart failure) ([address] CKD (chronic kidney disease) stage 3, GFR 30-59 ml/min (HCC), Clostridium difficile diarrhea, Diabetes mellitus ([address] Diabetic neuropathy ([address] Disease of blood and blood forming organ, Dizziness, GERD (gastroesophageal reflux disease), Headache, Hearing loss, Hyperlipidemia, Hypertension, Kidney stone, Refusal of blood transfusions as patient is Confucianism, Sleep apnea, Tinnitus, Venous ulcer ([address] and Wound, open. Past Surgical History:  has a past surgical history that includes hernia repair (age2); Cholecystectomy (2011); other surgical history (-16- REPAIR LOWER STERNAL INCISION, REMOVAL OF ONE STERNAL WIRE,); Upper gastrointestinal endoscopy; Cardiac surgery; other surgical history (10/10/2014); and Pain management procedure (N/A, 2/10/2022). Assessment   Body Structures, Functions, Activity Limitations Requiring Skilled Therapeutic Intervention: Decreased functional mobility ; Decreased strength;Decreased endurance;Decreased balance;Decreased posture  Assessment: Pt referred for PT evaluation during current hospital stay with dx of acute diastolic CHF. Pt currently functioning below baseline, although just slightly, requiring CGA/SBA x 1 with use of walker for support with transfers. Pt declined to amb longer distance than from bed>chair today 2* fatigue and pain, although agreeable to attempt amb in hallway at next session. Recommend pt return home with assist PRN from family/friends and home PT services. Treatment Diagnosis: Decreased endurance and (I) with functional mobility  Specific Instructions for Next Treatment: Progress ther ex and mobility as tolerated  Therapy Prognosis: Good  Decision Making: Medium Complexity  Requires PT Follow-Up: Yes  Disease-specific education: Pt educated on role of PT in hospital, benefits of regular OOB activity to maintain level of endurance and (I) with mobility - pt verbalizes understanding. Activity Tolerance: Patient tolerated evaluation without incident;Patient tolerated treatment well;Patient limited by endurance     Plan   Plan: 3-5 times per week  Specific Instructions for Next Treatment: Progress ther ex and mobility as tolerated  Current Treatment Recommendations: Strengthening,Balance training,Functional mobility training,Transfer training,Endurance training,Gait training,Home exercise program,Safety education & training  Safety Devices: All fall risk precautions in place,Call light within reach,Gait belt,Nurse notified,Left in chair,Patient at risk for falls (pt declining bed/chair alarms per RN)     Restrictions  Restrictions/Precautions  Restrictions/Precautions: Fall Risk,General Precautions  Position Activity Restriction  Other position/activity restrictions: 4L O2, telemetry, IV lines     Subjective   General  Chart Reviewed: Yes  Patient assessed for rehabilitation services?: Yes  Family / Caregiver Present: No  Referring Practitioner: Dr. Kristal Merlos  Referral Date : 04/26/22  Diagnosis: Acute diastolic CHF  Follows Commands: Within Functional Limits  General Comment  Comments: Pt resting in bed upon entry of PT  Subjective  Subjective: Pt agreeable to work with PT this afternoon with min encouragment needed for OOB activity.   C/o \"generalized 8/10\" pain \"all over\" -- but especially in BLE. Vital Signs  Pulse: 63  BP: (!) 119/55  BP Location: Left upper arm  MAP (Calculated): 76.33  Patient Position: Up in chair;Sitting  Oxygen Therapy  SpO2: 93 %  O2 Device: Nasal cannula (4L O2)      Social/Functional History  Social/Functional History  Lives With: Alone  Type of Home: Apartment  Home Layout: One level  Home Access: Stairs to enter without rails  Entrance Stairs - Number of Steps: 2 VENITA  Bathroom Shower/Tub: Tub/Shower unit  Bathroom Toilet: Standard  Bathroom Equipment: Grab bars in shower,Grab bars around toilet  Home Equipment: Oxygen,Cane,Rollator (3L O2 at night)  Has the patient had two or more falls in the past year or any fall with injury in the past year?: No  Receives Help From: Friend(s),Other (comment),Home health  ADL Assistance: Independent  Ambulation Assistance: Independent (using [id number])  Transfer Assistance: Independent  Additional Comments: Pt is on waitlist for [facility]). Pt current with home health PT/OT/RN. Pt has an aide every 2 weeks that help with housekeeping. Friend completes grocery shopping. Pt reports that he only complete simple meal prep. Pt reports that he has been struggling to complete ADLs recently but has beeen managing the best he can. Pt has been sponge bathing at home. Pt ambulates with [id number]. Pt reports using 2.5-4L O2 at baseline. Pt denies any falls in the last year.      Vision/Hearing  Hearing: Within functional limits    Vision: Within functional limits    Cognition   Orientation  Overall Orientation Status: Within Normal Limits     Objective   Observation/Palpation  Posture: Fair  Gross Assessment  AROM: Generally decreased, functional  Strength: Generally decreased, functional  Coordination: Generally decreased, functional     Bed Mobility Training  Bed Mobility Training: Yes  Supine to Sit: Supervision  Scooting: Supervision    Balance  Sitting: Intact  Standing: Intact    Transfer Training  Transfer Training: Yes  Sit to Stand: Stand-by assistance  Stand to Sit: Stand-by assistance  Bed to Chair: Stand-by assistance (using std. Walker)    Gait Training  Gait Training: Yes  Gait  Overall Level of Assistance: Contact-guard assistance;Stand-by assistance; Additional time  Interventions: Verbal cues  Base of Support: Widened  Speed/Chrissy: Pace decreased (< 100 feet/min); Shuffled; Slow  Step Length: Left shortened;Right shortened  Gait Abnormalities: Decreased step clearance; Step to gait  Distance (ft): 3 Feet  Assistive Device: Walker    AM-PAC Score  AM-PAC Inpatient Mobility Raw Score : 19 (04/26/22 1716)  AM-PAC Inpatient T-Scale Score : 45.44 (04/26/22 1716)  Mobility Inpatient CMS 0-100% Score: 41.77 (04/26/22 1716)  Mobility Inpatient CMS G-Code Modifier : CK (04/26/22 1716)    Goals  Short Term Goals  Time Frame for Short term goals: 1 week, 5/03/22 (unless otherwise specified)  Short term goal 1: Pt will transfer supine <-> sit with modified(I)  Short term goal 2: Pt will transfer sit <-> stand and bed>chair using RW with supervision  Short term goal 3: Pt will ambulate x 125 feet using RW with supervision  Short term goal 4: By 4/29/22: Pt will tolerate 12-15 reps BLE exercise for strengthening, balance, and endurance  Patient Goals   Patient goals : \"To get stronger and walk in the hallway\"       Therapy Time   Individual Concurrent Group Co-treatment   Time In 1509         Time Out 1545         Minutes 36         Timed Code Treatment Minutes: [address] [place], [place] #[id number]    If pt is unable to be seen after this session, please let this note serve as discharge summary. Please see case management note for discharge disposition. Thank you.

## 2022-04-26 NOTE — PROGRESS NOTES
Hospitalist Progress Note    Date of Admission: 4/24/2022    Chief Complaint: SOB    Hospital Course:   75 y.o. male who presented to [facility] with above complaints  Patient with PMH of CAD s/p CABG, diastolic CHF, CKD, HTN, DM 2, HLD, sleep apnea, pulmonary hypertension presented to the ED today with complaints of increasing shortness of breath. Has had to go up on his oxygen requirements at home up to 6 L, traditionally uses 3 to 4 L/min. Also reports increased weight gain and worsening leg swelling. Has had severe orthopnea sleeping upright in a recliner every night. Does have some chest heaviness. Feels like his abdomen is swollen. Has early satiety. Patient admits that since being discharged home he has been eating and drinking a lot more when he should not have been doing that. Patient reports increased dyspnea even with minimal activity. Denies any palpitations, nausea vomiting or abdominal pain. Reports he has been compliant with his diuretics at home. He was recently admitted to [facility] for CHF exacerbation earlier this month. He is a Sikh and will not accept blood products. On discharge on 4/9/2022 he weighed 287 pounds, on re-admission weighed 316 pounds. Subjective: Weight up overnight but incongruent with intake/output. Diuresing well. SOB improved some. No chest pain. Labs:   Recent Labs     04/24/22 2201   WBC 6.9   HGB 10.1*   HCT 31.9*        Recent Labs     04/24/22  2201 04/25/22  0826 04/26/22  0743    136 136   K 4.7 4.6 4.6   CL 96* 95* 95*   CO2 29 29 32   BUN 78* 85* 84*   CREATININE 1.9* 1.8* 1.8*   CALCIUM 9.5 9.5 10.4     Recent Labs     04/24/22  2201   AST 17   ALT 8*   BILITOT 0.4   ALKPHOS 81     No results for input(s): INR in the last 72 hours.     Physical Exam Performed:    BP (!) 103/49   Pulse 67   Temp 97.7 °F (36.5 °C) (Oral)   Resp 18   Ht 5' 9\" (1.753 m)   Wt (!) 321 lb 1.6 oz (145.7 kg)   SpO2 97%   BMI 47.42 kg/m²   General appearance: Very obese, no apparent distress, appears stated age and cooperative. Morbidly obese. HEENT:  Normal cephalic, atraumatic without obvious deformity. Pupils equal, round, and reactive to light. Extra ocular muscles intact. Conjunctivae/corneas clear. Neck: Supple, with full range of motion. JVP elevated. Trachea midline. Respiratory:  Normal respiratory effort. Clear to auscultation, bilaterally without Rales/Wheezes/Rhonchi. Cardiovascular:  Regular rate and rhythm with normal S1/S2 without murmurs, rubs or gallops. Abdomen: Soft, non-tender, distended with fluid thrill +, with normal bowel sounds. Musculoskeletal:  No clubbing, cyanosis  bilaterally. Full range of motion without deformity. BLE edema improving  Skin: Skin color, texture, turgor normal.  No rashes or lesions. Neurologic:  Neurovascularly intact without any focal sensory/motor deficits.  Cranial nerves: II-XII intact, grossly non-focal.  Psychiatric:  Alert and oriented, thought content appropriate, normal insight  Capillary Refill: Brisk,3 seconds, normal  Peripheral Pulses: +2 palpable, equal bilaterally       Assessment/Plan:    Active Hospital Problems    Diagnosis     Elevated troponin [R77.8]      Priority: Medium    Acute diastolic HF (heart failure) ([facility]) [I50.31]      Priority: Medium    PAF (paroxysmal atrial fibrillation) ([facility]) [I48.0]     CKD (chronic kidney disease) [N18.9]     Severe obstructive sleep apnea [G47.33]     Obesity, Class III, BMI 40-49.9 (morbid obesity) ([facility] [E66.01]     HLD (hyperlipidemia) [E78.5]     Morbid obesity [facility] [E66.01]     S/P CABG x 3 [Z95.1]     Essential hypertension [I10]     Anemia of chronic disease [D63.8]     DM (diabetes mellitus) ([facility] [E11.9]     Coronary artery disease involving native coronary artery of native heart without angina pectoris [I25.10]        Acute on chronic diastolic HF (heart failure)   Wt Readings from Last 5 Encounters: 04/26/22 (!) 321 lb 1.6 oz (145.7 kg)   04/09/22 287 lb 9.6 oz (130.5 kg)   03/14/22 282 lb 9.6 oz (128.2 kg)   02/10/22 280 lb (127 kg)   01/17/22 282 lb 8 oz (128.1 kg)   30 pound weight gain in the last 2 weeks  BNP elevated 4308  CXR showing CHF changes  -Continue Lasix gtt per Nephrology  -Strict I's/O and daily weights  -1500 cc fluid restricted, low-sodium diet  -Last echo-in August 2021 showed LVEF 55%, elevated left ventricular diastolic pressures, moderate AS    CAD s/p CABG x3  Troponin 0.05   Likely due to CHF. Follow troponin trend  Last cath in 2012 showed widely patent grafts  Continue aspirin, statin, Imdur, Coreg    MARGARITO on CKD  Due to CHF, cardiorenal.  Consulted nephrology to assist with management, IV Lasix drip ordered  Daily renal panel    Acute on chronic hypoxemic respiratory failure  Currently on 6 L oxygen, usually on 3 to 4 L at home  Wean as tolerated to maintain sats >92%    Severe HENNA-resume home BiPAP    DM type II-controlled, A1c 8.5  resume home insulin regimen, added SSI with Accu-Cheks, hypoglycemia protocol     Hyperlipidemia-statin resumed     Morbid obesity BMI 70.69  Complicating assessment and treatment, placing patient at high risk for multiple co-morbidities as well as early death and contributing to the patient's presentation. Hypertension-controlled, resume home medication regimen    Paroxysmal A. Fib -controlled on beta-blocker. Not on anticoagulation  [id number] score is 5 placing him at about 7% risk of stroke per year. Patient declines anticoagulation at this time     Anemia of chronic disease-Hb stable, monitor. Patient is a Latter-day    DVT Prophylaxis: Subcu Lovenox  Diet: ADULT DIET; Regular; 4 carb choices (60 gm/meal);  Low Sodium (2 gm); 1500 ml  Code Status: Full Code  PT/OT Eval Status: NA    Dispo - > 2 days    Meng Gutiérrez MD

## 2022-04-26 NOTE — PROGRESS NOTES
Occupational Therapy  Facility/Department: [place] [address] TELEMETRY  Occupational Therapy Initial Assessment    Name: Pepper Whalen  : 1955  MRN: [id number]  Date of Service: 2022    Discharge Recommendations:  Home with [address] [place] [address]          Patient Diagnosis(es): The primary encounter diagnosis was Acute respiratory failure with hypoxia ([address] Diagnoses of Acute on chronic diastolic congestive heart failure (HCC) and MARGARITO (acute kidney injury) ([address] were also pertinent to this visit. Past Medical History:  has a past medical history of Anemia, Angina, Arthritis, CAD (coronary artery disease), CHF (congestive heart failure) ([address] CKD (chronic kidney disease) stage 3, GFR 30-59 ml/min (HCC), Clostridium difficile diarrhea, Diabetes mellitus ([address] Diabetic neuropathy ([address] Disease of blood and blood forming organ, Dizziness, GERD (gastroesophageal reflux disease), Headache, Hearing loss, Hyperlipidemia, Hypertension, Kidney stone, Refusal of blood transfusions as patient is Yarsani, Sleep apnea, Tinnitus, Venous ulcer ([address] and Wound, open. Past Surgical History:  has a past surgical history that includes hernia repair (age1); Cholecystectomy (2011); other surgical history (10 REPAIR LOWER STERNAL INCISION, REMOVAL OF ONE STERNAL WIRE,); Upper gastrointestinal endoscopy; Cardiac surgery; other surgical history (10/10/2014); and Pain management procedure (N/A, 2/10/2022). Assessment   Performance deficits / Impairments: Decreased functional mobility ; Decreased safe awareness;Decreased balance;Decreased ADL status; Decreased endurance;Decreased strength  Patient admitted from home, noted today decreased endurance and ADL. Educated patient on energy conservation and fluid intake d/t CHF dx. After evaluation, pt found to be presenting with the above mentioned occupational performance deficits which are affecting participation in daily living skills.  Pt would benefit from continued skilled occupational therapy to address ADLs, functional mobility, and safety while in acute care. Prognosis: Good  Decision Making: Medium Complexity  REQUIRES OT FOLLOW-UP: Yes  Activity Tolerance  Activity Tolerance: Patient Tolerated treatment well        Plan   Plan  Times per Week: 3-5x's a week while in acute care     Restrictions  Restrictions/Precautions  Restrictions/Precautions: Fall Risk,General Precautions  Position Activity Restriction  Other position/activity restrictions: 4L O2, telemetry, IV lines    Subjective   General  Chart Reviewed: Yes,Orders,Progress Notes,History and Physical  Patient assessed for rehabilitation services?: Yes  Family / Caregiver Present: No  Referring Practitioner: Michelle Kemp MD  Diagnosis: Acute diastolic CHF  Subjective  Subjective: Pt pleasant and agreeable to OT evaluation. \"I'm thinking I'm [employer]"  Pain Assessment  Pain Level: 7  Pre Treatment Pain Screening  Comments / Details: c/o \"generalized 8/10\" pain \"all over\" -- but especially in BLE.   Vital Signs  Temp: 98.1 °F (36.7 °C)  Temp Source: Oral  Pulse: 63  Resp: 18  BP: (!) 119/55  BP Location: Left upper arm  MAP (Calculated): 76.33  Patient Position: Up in chair;Sitting  Level of Consciousness: Alert (0)  MEWS Score: 1  Oxygen Therapy  SpO2: 93 %  O2 Device: Nasal cannula (4L O2)  O2 Flow Rate (L/min): 4 L/min  Social/Functional History  Social/Functional History  Lives With: Alone  Type of Home: Apartment  Home Layout: One level  Home Access: Stairs to enter without rails  Entrance Stairs - Number of Steps: 2 VENITA  Bathroom Shower/Tub: Tub/Shower unit  Bathroom Toilet: Standard  Bathroom Equipment: Grab bars in shower,Grab bars around toilet  Home Equipment: Oxygen,Cane,Rollator (3L O2 at night)  Has the patient had two or more falls in the past year or any fall with injury in the past year?: No  Receives Help From: Friend(s),Other (comment),Home health  ADL Assistance: Independent  Ambulation Assistance: Independent (using [id number])  Transfer Assistance: Independent  Additional Comments: [facility] Pt current with home health PT/OT/RN. Pt has an aide every 2 weeks that help with housekeeping. Friend completes grocery shopping. Pt reports that he only complete simple meal prep. Pt reports that he has been struggling to complete ADLs recently but has beeen managing the best he can. Pt has been sponge bathing at home. Pt ambulates with [id number]. Pt reports using 2.5-4L O2 at baseline. Pt denies any falls in the last year. Objective           Observation/Palpation  Posture: Fair  Safety Devices  Type of Devices: All fall risk precautions in place;Call light within reach;Gait belt;Nurse notified; Left in chair;Patient at risk for falls  Balance  Sitting Balance: Supervision  Standing Balance: Stand by assistance     ADL  Feeding: Setup  Grooming: Setup  UE Dressing: Moderate assistance  LE Dressing: Maximum assistance     Activity Tolerance  Activity Tolerance: Patient tolerated evaluation without incident;Patient tolerated treatment well;Patient limited by endurance  Bed mobility  Supine to Sit: Supervision  Sit to Supine: Unable to assess (up to chair)  Transfers  Stand Step Transfers: Stand by assistance  Sit to stand: Stand by assistance  Stand to sit: Stand by assistance     Cognition  Overall Cognitive Status: [facility]                  Education Given To: Patient  Education Provided: Role of Therapy; ADL Adaptive Strategies; Plan of Care;Transfer Training;Energy Conservation  Education Provided Comments: CHF   5 P's of Energy Conservation    Prioritize/Plan      Decide what needs to be done today, and what can wait for a later date. To do lists. Plan ahead to avoid extra trips. Gather supplies and equipment needed before starting an activity.    Position      Avoid tiring and awkward posture that may impair breathing  Pace      Slow and steady pace, never rushing! Pursed lip breathing.   Pursed Lip Breathing     \"Smell the roses, blow out the candles\"    Pt verbalized understanding after education:    [x] Yes  []  Needs reinforcement       LUE AROM (degrees)  LUE AROM : [facility]  RUE AROM (degrees)  RUE AROM : [facility]                  AM-PAC Score        AM-PAC Inpatient Daily Activity Raw Score: 15 (04/26/22 1734)  AM-PAC Inpatient ADL T-Scale Score : 34.69 (04/26/22 1734)  ADL Inpatient CMS 0-100% Score: 56.46 (04/26/22 1734)  ADL Inpatient CMS G-Code Modifier : CK (04/26/22 1734)    Goals  Short Term Goals  Time Frame for Short term goals: 1 week 5/4  Short Term Goal 1: Pt will complete toilet transfers with SBA by 5/1  Short Term Goal 2: Pt will complete standing level ADLs with supervision  Short Term Goal 3: Pt will complete LE dressing with Gustavo  Patient Goals   Patient goals : \"to get stronger\"       Therapy Time   Individual Concurrent Group Co-treatment   Time In 1509         Time Out 1545         Minutes 36         Timed Code Treatment Minutes: 32 Minutes       Cristofer Hahn, OT

## 2022-04-26 NOTE — CARE COORDINATION
Pt is from home, active w/[facility]. PT/OT ref made. Pt is on waiting list for [facility];  CM will continue to follow for needs.  Frankie Thompson RN

## 2022-04-26 NOTE — PROGRESS NOTES
Attempted to see patient, he was sleeping soundly. Patient is well known to me. Chart reviewed. Agree with current regimen. Will see tomorrow.      Dariel Melendrez CNP, 4/26/2022, 5:46 PM

## 2022-04-26 NOTE — CONSULTS
Introduced self to patient as new Wound Care in training nurse. Patient acceptable in allowing care to lower extremities.

## 2022-04-26 NOTE — PLAN OF CARE
Patient's EF (Ejection Fraction) is greater than 40%    Heart Failure Medications:   Diuretics[de-identified] Furosemide     (One of the following REQUIRED for EF </= 40%/SYSTOLIC FAILURE but MAY be used in EF% >40%/DIASTOLIC FAILURE)        ACE[de-identified] None        ARB[de-identified] None         ARNI[de-identified] None    (Beta Blockers)   NON- Evidenced Based Beta Blocker (for EF% >40%/DIASTOLIC FAILURE): None     Evidenced Based Beta Blocker::(REQUIRED for EF% <40%/SYSTOLIC FAILURE) Carvedilol- Coreg  . .................................................................................................................................................. Patient's weights and intake/output reviewed: Yes    Patient's Last Weight: 321 lbs obtained by standing scale. Difference of 4 lbs more than last documented weight. Intake/Output Summary (Last 24 hours) at 4/26/2022 1041  Last data filed at 4/26/2022 0943  Gross per 24 hour   Intake 840 ml   Output 1400 ml   Net -560 ml       Comorbidities Reviewed Yes    Patient has a past medical history of Anemia, Angina, Arthritis, CAD (coronary artery disease), CHF (congestive heart failure) ([address] CKD (chronic kidney disease) stage 3, GFR 30-59 ml/min ([facility]), Clostridium difficile diarrhea, Diabetes mellitus ([address] Diabetic neuropathy ([address] Disease of blood and blood forming organ, Dizziness, GERD (gastroesophageal reflux disease), Headache, Hearing loss, Hyperlipidemia, Hypertension, Kidney stone, Refusal of blood transfusions as patient is Sabianism, Sleep apnea, Tinnitus, Venous ulcer ([address] and Wound, open. >>For CHF and Comorbidity documentation on Education Time and Topics, please see Education Tab    Progressive Mobility Assessment:  What is this patient's Current Level of Mobility?: Ambulatory-Up Ad Mami  How was this patient Mobilized today?: Up to Chair,  Up to Toilet/Shower and Up in Room, ambulated 10 ft                 With Whom?  Self                 Level of Difficulty/Assistance: Independent     Pt resting in bed at this time on 4 L O2. Pt denies shortness of breath. Pt with pitting lower extremity edema.      Patient and/or Family's stated Goal of Care this Admission: reduce shortness of breath, increase activity tolerance, better understand heart failure and disease management, be more comfortable and reduce lower extremity edema prior to discharge        :

## 2022-04-27 LAB
ANION GAP SERPL CALCULATED.3IONS-SCNC: 11 MMOL/L (ref 3–16)
BUN BLDV-MCNC: 77 MG/DL (ref 7–20)
CALCIUM SERPL-MCNC: 9.8 MG/DL (ref 8.3–10.6)
CHLORIDE BLD-SCNC: 95 MMOL/L (ref 99–110)
CO2: 31 MMOL/L (ref 21–32)
CREAT SERPL-MCNC: 1.6 MG/DL (ref 0.8–1.3)
GFR AFRICAN AMERICAN: 52
GFR NON-AFRICAN AMERICAN: 43
GLUCOSE BLD-MCNC: 217 MG/DL (ref 70–99)
GLUCOSE BLD-MCNC: 229 MG/DL (ref 70–99)
GLUCOSE BLD-MCNC: 260 MG/DL (ref 70–99)
GLUCOSE BLD-MCNC: 292 MG/DL (ref 70–99)
GLUCOSE BLD-MCNC: 293 MG/DL (ref 70–99)
GLUCOSE BLD-MCNC: 309 MG/DL (ref 70–99)
MAGNESIUM: 2.7 MG/DL (ref 1.8–2.4)
PERFORMED ON: ABNORMAL
POTASSIUM SERPL-SCNC: 4.1 MMOL/L (ref 3.5–5.1)
SODIUM BLD-SCNC: 137 MMOL/L (ref 136–145)

## 2022-04-27 PROCEDURE — 6370000000 HC RX 637 (ALT 250 FOR IP): Performed by: NURSE PRACTITIONER

## 2022-04-27 PROCEDURE — 2700000000 HC OXYGEN THERAPY PER DAY

## 2022-04-27 PROCEDURE — 6360000002 HC RX W HCPCS: Performed by: NURSE PRACTITIONER

## 2022-04-27 PROCEDURE — 2580000003 HC RX 258: Performed by: NURSE PRACTITIONER

## 2022-04-27 PROCEDURE — 6360000002 HC RX W HCPCS: Performed by: INTERNAL MEDICINE

## 2022-04-27 PROCEDURE — 6370000000 HC RX 637 (ALT 250 FOR IP): Performed by: REGISTERED NURSE

## 2022-04-27 PROCEDURE — 36415 COLL VENOUS BLD VENIPUNCTURE: CPT

## 2022-04-27 PROCEDURE — 6370000000 HC RX 637 (ALT 250 FOR IP): Performed by: INTERNAL MEDICINE

## 2022-04-27 PROCEDURE — 1200000000 HC SEMI PRIVATE

## 2022-04-27 PROCEDURE — 80048 BASIC METABOLIC PNL TOTAL CA: CPT

## 2022-04-27 PROCEDURE — 2580000003 HC RX 258: Performed by: INTERNAL MEDICINE

## 2022-04-27 PROCEDURE — 97530 THERAPEUTIC ACTIVITIES: CPT

## 2022-04-27 PROCEDURE — 99233 SBSQ HOSP IP/OBS HIGH 50: CPT | Performed by: NURSE PRACTITIONER

## 2022-04-27 PROCEDURE — 94761 N-INVAS EAR/PLS OXIMETRY MLT: CPT

## 2022-04-27 PROCEDURE — 83735 ASSAY OF MAGNESIUM: CPT

## 2022-04-27 RX ORDER — ACETAMINOPHEN, ASPIRIN AND CAFFEINE 250; 250; 65 MG/1; MG/1; MG/1
1 TABLET, FILM COATED ORAL ONCE
Status: COMPLETED | OUTPATIENT
Start: 2022-04-27 | End: 2022-04-27

## 2022-04-27 RX ORDER — METOLAZONE 2.5 MG/1
5 TABLET ORAL ONCE
Status: COMPLETED | OUTPATIENT
Start: 2022-04-27 | End: 2022-04-27

## 2022-04-27 RX ADMIN — FERROUS SULFATE TAB 325 MG (65 MG ELEMENTAL FE) 325 MG: 325 (65 FE) TAB at 09:07

## 2022-04-27 RX ADMIN — ATORVASTATIN CALCIUM 80 MG: 80 TABLET, FILM COATED ORAL at 20:35

## 2022-04-27 RX ADMIN — ENOXAPARIN SODIUM 30 MG: 100 INJECTION SUBCUTANEOUS at 20:35

## 2022-04-27 RX ADMIN — TAMSULOSIN HYDROCHLORIDE 0.4 MG: 0.4 CAPSULE ORAL at 05:12

## 2022-04-27 RX ADMIN — INSULIN LISPRO 6 UNITS: 100 INJECTION, SOLUTION INTRAVENOUS; SUBCUTANEOUS at 09:11

## 2022-04-27 RX ADMIN — PREGABALIN 25 MG: 25 CAPSULE ORAL at 09:06

## 2022-04-27 RX ADMIN — MICONAZOLE NITRATE: 2 POWDER TOPICAL at 09:05

## 2022-04-27 RX ADMIN — INSULIN LISPRO 6 UNITS: 100 INJECTION, SOLUTION INTRAVENOUS; SUBCUTANEOUS at 13:26

## 2022-04-27 RX ADMIN — OXYCODONE AND ACETAMINOPHEN 1 TABLET: 5; 325 TABLET ORAL at 05:12

## 2022-04-27 RX ADMIN — INSULIN LISPRO 4 UNITS: 100 INJECTION, SOLUTION INTRAVENOUS; SUBCUTANEOUS at 17:55

## 2022-04-27 RX ADMIN — ASPIRIN 81 MG 81 MG: 81 TABLET ORAL at 09:08

## 2022-04-27 RX ADMIN — DOCUSATE SODIUM 100 MG: 100 CAPSULE, LIQUID FILLED ORAL at 09:08

## 2022-04-27 RX ADMIN — SENNOSIDES 8.6 MG: 8.6 TABLET, COATED ORAL at 20:35

## 2022-04-27 RX ADMIN — MICONAZOLE NITRATE: 2 POWDER TOPICAL at 20:40

## 2022-04-27 RX ADMIN — PANTOPRAZOLE SODIUM 20 MG: 20 TABLET, DELAYED RELEASE ORAL at 17:39

## 2022-04-27 RX ADMIN — INSULIN GLARGINE 35 UNITS: 100 INJECTION, SOLUTION SUBCUTANEOUS at 09:10

## 2022-04-27 RX ADMIN — OXYCODONE AND ACETAMINOPHEN 1 TABLET: 5; 325 TABLET ORAL at 22:48

## 2022-04-27 RX ADMIN — METOLAZONE 5 MG: 2.5 TABLET ORAL at 17:39

## 2022-04-27 RX ADMIN — CARVEDILOL 3.12 MG: 3.12 TABLET, FILM COATED ORAL at 09:08

## 2022-04-27 RX ADMIN — PREGABALIN 25 MG: 25 CAPSULE ORAL at 00:26

## 2022-04-27 RX ADMIN — POLYETHYLENE GLYCOL 3350 17 G: 17 POWDER, FOR SOLUTION ORAL at 21:53

## 2022-04-27 RX ADMIN — FUROSEMIDE 10 MG/HR: 10 INJECTION INTRAMUSCULAR; INTRAVENOUS at 21:53

## 2022-04-27 RX ADMIN — DOCUSATE SODIUM 100 MG: 100 CAPSULE, LIQUID FILLED ORAL at 20:35

## 2022-04-27 RX ADMIN — OXYCODONE AND ACETAMINOPHEN 1 TABLET: 5; 325 TABLET ORAL at 12:08

## 2022-04-27 RX ADMIN — ACETAMINOPHEN, ASPIRIN AND CAFFEINE 1 TABLET: 250; 250; 65 TABLET, FILM COATED ORAL at 12:08

## 2022-04-27 RX ADMIN — IRON SUCROSE 200 MG: 20 INJECTION, SOLUTION INTRAVENOUS at 18:08

## 2022-04-27 RX ADMIN — ENOXAPARIN SODIUM 30 MG: 100 INJECTION SUBCUTANEOUS at 09:04

## 2022-04-27 RX ADMIN — PANTOPRAZOLE SODIUM 20 MG: 20 TABLET, DELAYED RELEASE ORAL at 05:12

## 2022-04-27 RX ADMIN — ISOSORBIDE MONONITRATE 15 MG: 30 TABLET, EXTENDED RELEASE ORAL at 09:08

## 2022-04-27 RX ADMIN — FUROSEMIDE 10 MG/HR: 10 INJECTION INTRAMUSCULAR; INTRAVENOUS at 12:13

## 2022-04-27 RX ADMIN — SENNOSIDES 8.6 MG: 8.6 TABLET, COATED ORAL at 09:07

## 2022-04-27 RX ADMIN — CARVEDILOL 3.12 MG: 3.12 TABLET, FILM COATED ORAL at 17:35

## 2022-04-27 RX ADMIN — INSULIN LISPRO 3 UNITS: 100 INJECTION, SOLUTION INTRAVENOUS; SUBCUTANEOUS at 20:40

## 2022-04-27 RX ADMIN — ALLOPURINOL 300 MG: 300 TABLET ORAL at 09:09

## 2022-04-27 ASSESSMENT — PAIN DESCRIPTION - ORIENTATION: ORIENTATION: RIGHT;LEFT

## 2022-04-27 ASSESSMENT — PAIN SCALES - GENERAL
PAINLEVEL_OUTOF10: 7
PAINLEVEL_OUTOF10: 8
PAINLEVEL_OUTOF10: 8
PAINLEVEL_OUTOF10: 0
PAINLEVEL_OUTOF10: 6
PAINLEVEL_OUTOF10: 7

## 2022-04-27 ASSESSMENT — PAIN DESCRIPTION - LOCATION
LOCATION: FOOT
LOCATION: HEAD

## 2022-04-27 ASSESSMENT — PAIN DESCRIPTION - DESCRIPTORS
DESCRIPTORS: ACHING
DESCRIPTORS: ACHING

## 2022-04-27 NOTE — PROGRESS NOTES
Patient's EF (Ejection Fraction) is greater than 40%    Heart Failure Medications:   Diuretics[de-identified] Furosemide     (One of the following REQUIRED for EF </= 40%/SYSTOLIC FAILURE but MAY be used in EF% >40%/DIASTOLIC FAILURE)        ACE[de-identified] None        ARB[de-identified] None         ARNI[de-identified] None    (Beta Blockers)   NON- Evidenced Based Beta Blocker (for EF% >40%/DIASTOLIC FAILURE): None     Evidenced Based Beta Blocker::(REQUIRED for EF% <40%/SYSTOLIC FAILURE) None  . .................................................................................................................................................. Patient's weights and intake/output reviewed: Yes    Patient's Last Weight: 318 lbs obtained by standing scale. Difference of 3 lbs less than last documented weight. Intake/Output Summary (Last 24 hours) at 4/27/2022 0109  Last data filed at 4/26/2022 1854  Gross per 24 hour   Intake 779.44 ml   Output 1450 ml   Net -670.56 ml       Comorbidities Reviewed Yes    Patient has a past medical history of Anemia, Angina, Arthritis, CAD (coronary artery disease), CHF (congestive heart failure) ([address] CKD (chronic kidney disease) stage 3, GFR 30-59 ml/min ([facility]), Clostridium difficile diarrhea, Diabetes mellitus ([address] Diabetic neuropathy ([address] Disease of blood and blood forming organ, Dizziness, GERD (gastroesophageal reflux disease), Headache, Hearing loss, Hyperlipidemia, Hypertension, Kidney stone, Refusal of blood transfusions as patient is [employer], Sleep apnea, Tinnitus, Venous ulcer ([address] and Wound, open. >>For CHF and Comorbidity documentation on Education Time and Topics, please see Education Tab    Progressive Mobility Assessment:  What is this patient's Current Level of Mobility?: Ambulatory-Up Ad Mami  How was this patient Mobilized today?: Edge of Bed, Up to Chair,  Up to Toilet/Shower and Up in Room, ambulated 0 ft                 With Whom?  Nurse, PCA and Self                 Level of Difficulty/Assistance: 1x Assist     Pt resting in bed at this time on 4 L O2. Pt denies shortness of breath. Pt with pitting lower extremity edema.      Patient and/or Family's stated Goal of Care this Admission: reduce shortness of breath, increase activity tolerance, better understand heart failure and disease management, be more comfortable and reduce lower extremity edema prior to discharge        :

## 2022-04-27 NOTE — PROGRESS NOTES
[facility] Progress Note           Subjective/   75y.o. year old male who we are seeing in consultation for MARGARITO/CKD-3. HPI:  Renal function stable with diuresis, urine output of 1,450 ml over last two shifts. States still short of breath, oxygen being weaned. ROS:  Intake adequate, +weak. Objective/   GEN:  Chronically ill, /61   Pulse 64   Temp 98 °F (36.7 °C) (Oral)   Resp 20   Ht 5' 9\" (1.753 m)   Wt (!) 318 lb 11.2 oz (144.6 kg)   SpO2 94%   BMI 47.06 kg/m²   HEENT: non-icteric, no JVD  CV: S1, S2 without m/r/g; +++ LE edema  RESP: CTA B without w/r/r; breathing wnl  ABD: +bs, soft, nt, no hsm  SKIN: warm, no rashes    Data/  Recent Labs     04/24/22  2201   WBC 6.9   HGB 10.1*   HCT 31.9*   MCV 87.2        Recent Labs     04/24/22  2201 04/25/22  0826 04/26/22  0743    136 136   K 4.7 4.6 4.6   CL 96* 95* 95*   CO2 29 29 32   GLUCOSE 129* 245* 259*   MG  --   --  2.80*   BUN 78* 85* 84*   CREATININE 1.9* 1.8* 1.8*   LABGLOM 35* 38* 38*   GFRAA 43* 46* 46*       Assessment/     - Acute on chronic kidney disease stage 3              Worsening in setting of CHF decompensation, hx of volatile fluctuations in levels              based on volume status & diuretic use              Background DM Nephropathy + Cardiorenal syndrome              Baseline SCr mid 1 range, 1.5-1. 7              Follows with Dr. Stacey Harp in office              Improving with diuresis in past                - dCHF exacerbation     - DM2 - poorly controlled     - Normocytic anemia, chronic - Hb at target     - Yarsanism    Plan/     - Continue lasix drip - titrated to 10 mg/hour, prn dose of metolazone today  - Discussed possibility of ultrafiltration treatments for further fluid removal - patient not excited about the idea as he does not think he could do long-term  - Trend labs, bp's, weights, & urine output     Overall prognosis ricardo    ____________________________________  Migel Turner MD  [facility]  [email or web address]  Office: [phone]

## 2022-04-27 NOTE — PLAN OF CARE
Problem: Pain  Goal: Verbalizes/displays adequate comfort level or baseline comfort level  4/27/2022 0034 by Padmini Alberto RN  Outcome: Progressing   Pt will be satisfied with pain control. Pt uses numeric pain rating scale with reassessments after pain med administration. Will continue to monitor progression throughout shift.

## 2022-04-27 NOTE — PROGRESS NOTES
Paged cardiologist. Per Dr. Jose Barrera, okay to d/c telemetry box at this time. Pt has urinated on 7 boxes in the last 24 hours.

## 2022-04-27 NOTE — PROGRESS NOTES
Hospitalist Progress Note    Date of Admission: 4/24/2022    Chief Complaint: SOB    Hospital Course:   75 y.o. male who presented to [facility] with above complaints. Pt with PMH of CAD s/p CABG, diastolic CHF, CKD, HTN, DM 2, HLD, sleep apnea, pulmonary hypertension presented to the ED with complaints of increasing SOB. Has had to go up on his oxygen requirements at home up to 6 L, traditionally uses 3 to 4 L/min. Also reports increased weight gain and worsening leg swelling. Has had severe orthopnea sleeping upright in a recliner every night. Does have some chest heaviness. Feels like his abdomen is swollen. Has early satiety. Pt admits that since being discharged home he has been eating and drinking a lot more when he should not have been doing that. Pt reports increased dyspnea even with minimal activity. Denies any palpitations, nausea vomiting or abdominal pain. Reports he has been compliant with his diuretics at home. He was recently admitted to [facility] for CHF exacerbation earlier this month. He is a [employer] and will not accept blood products. On discharge on 4/9/2022 he weighed 287 pounds, on re-admission weighed 316 pounds. Subjective:   Pt is on 4 LPM. Afebrile. VSS. Complains of headache. Weight is 318 lbs today. On lasix gtt. Labs:   Recent Labs     04/24/22  2201   WBC 6.9   HGB 10.1*   HCT 31.9*        Recent Labs     04/25/22  0826 04/26/22  0743 04/27/22  0831    136 137   K 4.6 4.6 4.1   CL 95* 95* 95*   CO2 29 32 31   BUN 85* 84* 77*   CREATININE 1.8* 1.8* 1.6*   CALCIUM 9.5 10.4 9.8     Recent Labs     04/24/22  2201   AST 17   ALT 8*   BILITOT 0.4   ALKPHOS 81     Physical Exam Performed:    BP (!) 112/57   Pulse 57   Temp 98.1 °F (36.7 °C) (Oral)   Resp 16   Ht 5' 9\" (1.753 m)   Wt (!) 318 lb 11.2 oz (144.6 kg)   SpO2 95%   BMI 47.06 kg/m²      General appearance: Very obese, no apparent distress, appears stated age and cooperative. Morbidly obese. HEENT:  Normal cephalic, atraumatic without obvious deformity. Pupils equal, round, and reactive to light. Extra ocular muscles intact. Conjunctivae/corneas clear. Neck: Supple, with full range of motion. JVP elevated. Trachea midline. Respiratory:  Normal respiratory effort. Clear to auscultation, bilaterally without Rales/Wheezes/Rhonchi. Cardiovascular:  Regular rate and rhythm with normal S1/S2 without murmurs, rubs or gallops. Abdomen: Soft, non-tender, distended with fluid thrill +, with normal bowel sounds. Musculoskeletal:  No clubbing, cyanosis  bilaterally. Full range of motion without deformity. BLE edema improving  Skin: Skin color, texture, turgor normal.  No rashes or lesions. Neurologic:  Neurovascularly intact without any focal sensory/motor deficits. Cranial nerves: II-XII intact, grossly non-focal.  Psychiatric:  Alert and oriented, thought content appropriate, normal insight  Capillary Refill: Brisk,3 seconds, normal  Peripheral Pulses: +2 palpable, equal bilaterally       Assessment/Plan:    Active Hospital Problems    Diagnosis     Elevated troponin [R77.8]      Priority: Medium    Acute diastolic HF (heart failure) ([facility]) [I50.31]      Priority: Medium    PAF (paroxysmal atrial fibrillation) ([facility]) [I48.0]     CKD (chronic kidney disease) [N18.9]     Severe obstructive sleep apnea [G47.33]     Obesity, Class III, BMI 40-49.9 (morbid obesity) ([facility] [E66.01]     HLD (hyperlipidemia) [E78.5]     Morbid obesity ([facility] [E66.01]     S/P CABG x 3 [Z95.1]     Essential hypertension [I10]     Anemia of chronic disease [D63.8]     DM (diabetes mellitus) ([facility] [E11.9]     Coronary artery disease involving native coronary artery of native heart without angina pectoris [I25.10]        Acute on chronic diastolic CHF  -30 pound weight gain in the last 2 weeks, BNP elevated 4308, CXR showing CHF changes.  -Continue Lasix gtt per Nephrology.  Plan for metolazone today.   -Strict I's/O and daily weights. -1500 cc fluid restricted, low-sodium diet.  -Last ECHO in August 2021 showed LVEF 55%, elevated left ventricular diastolic pressures, moderate AS. CAD s/p CABG x3  -Troponin 0.05. Likely due to CHF. Flat trend not consistent with ACS. -Last cath in 2012 showed widely patent grafts. -Continue aspirin, statin, Imdur, Coreg. MARGARITO on CKD  -Due to CHF, cardiorenal.  -Consulted nephrology to assist with management, IV Lasix drip ordered.  -Daily renal panel. Acute on chronic hypoxemic respiratory failure  -Was up to 6 L oxygen, usually on 3 to 4 L at home, currently on 4 liters.   -Wean as tolerated to maintain sats >92%. Severe HENNA  -Continue home BiPAP. DM type II  -Controlled, A1c 8.5.  -Continue home insulin regimen, added SSI with Accu-Cheks, hypoglycemia protocol.     Hyperlipidemia  -Continue statin.      Morbid obesity   -BMI 46.81. Complicating assessment and treatment, placing patient at high risk for multiple co-morbidities as well as early death and contributing to the patient's presentation. Hypertension  -Controlled, continue home medication regimen. Paroxysmal A. Fib  -Controlled on beta-blocker. Not on anticoagulation. [id number] score is 5 placing him at about 7% risk of stroke per year. Pt declines anticoagulation at this time. Anemia of chronic disease  - Hb stable, monitor. Patient is a [employer]. DVT Prophylaxis: Subcu Lovenox  Diet: ADULT DIET; Regular; 4 carb choices (60 gm/meal);  Low Sodium (2 gm); 1500 ml  Code Status: Full Code  PT/OT Eval Status: NA    Dispo - > 2 days    PATRICK Smith - CNP

## 2022-04-27 NOTE — PROGRESS NOTES
[facility]   Daily Progress Note    Admit Date:  4/24/2022  HPI:    Chief Complaint   Patient presents with    Shortness of Breath     Hx CHF. Feels swollen & SOB. 3-4L @ home. Currently on 6L n/c. Interval history: Pepper Whalen is being followed for CHF. Known to our cardiovascular service. Recently admitted, discharge weight on 4/9/2022 was 287 pounds. Weight this admission at 316 pounds. Subjective:  Mr. Charlotte Waller is having bilateral lower extremity edema, left leg pain related to the sores. Continues on Lasix drip    Objective:   BP (!) 112/57   Pulse 57   Temp 98.1 °F (36.7 °C) (Oral)   Resp 16   Ht 5' 9\" (1.753 m)   Wt (!) 318 lb 11.2 oz (144.6 kg)   SpO2 95%   BMI 47.06 kg/m²       Intake/Output Summary (Last 24 hours) at 4/27/2022 1633  Last data filed at 4/27/2022 0847  Gross per 24 hour   Intake 419.44 ml   Output 350 ml   Net 69.44 ml       NYHA: IV    Physical Exam:  General:  Awake, alert, NAD, obese   Skin:  Warm and dry  Neck:  JVD elevated   Chest:  Dim to auscultation, no wheezes/rhonchi/ + few rales in the right lower base   Telemetry: not on telemetry   Cardiovascular:  RRR S1S2, + murmur, no r/g   Abdomen: nontender, +bowel sounds, tight anasarca   Extremities:  +++ bilateral lower extremity edema up to the thigh edema  Bilateral lower legs wrapped with ace wraps.      Medications:    metOLazone  5 mg Oral Once    allopurinol  300 mg Oral Daily    aspirin  81 mg Oral Daily    atorvastatin  80 mg Oral Nightly    carvedilol  3.125 mg Oral BID WC    docusate sodium  100 mg Oral BID    ferrous sulfate  325 mg Oral BID    insulin glargine  35 Units SubCUTAneous Daily    insulin lispro  0-12 Units SubCUTAneous TID WC    insulin lispro  0-6 Units SubCUTAneous Nightly    isosorbide mononitrate  15 mg Oral Daily    pantoprazole  20 mg Oral BID AC    pregabalin  25 mg Oral BID    senna  1 tablet Oral BID    tamsulosin  0.4 mg Oral Daily    sodium chloride flush  5-40 mL IntraVENous 2 times per day    enoxaparin  30 mg SubCUTAneous BID    miconazole   Topical BID      dextrose      sodium chloride      furosemide (LASIX) 1mg/ml infusion 10 mg/hr (04/27/22 1213)       Lab Data:  CBC:   Recent Labs     04/24/22 2201   WBC 6.9   HGB 10.1*        BMP:    Recent Labs     04/25/22  0826 04/26/22  0743 04/27/22  0831    136 137   K 4.6 4.6 4.1   CO2 29 32 31   BUN 85* 84* 77*   CREATININE 1.8* 1.8* 1.6*     INR:  No results for input(s): INR in the last 72 hours. BNP:    Recent Labs     04/24/22 2201   PROBNP 4,308*     Lab Results   Component Value Date    LVEF 67 03/10/2022       Testing:    Echocardiogram 1/13/2022   Summary   Limited only f/u for LVEF and aortic valve stenosis. Technically difficult examination. Low normal left ventricular systolic function with ejection fraction of 50%. Moderate aortic stenosis. Aortic stenosis values appear approximately same when compared to echo on   8-. Mild to moderate aortic regurgitation. Principal Problem:    Acute diastolic HF (heart failure) ([facility])  Active Problems:    Elevated troponin    DM (diabetes mellitus) ([facility]    Coronary artery disease involving native coronary artery of native heart without angina pectoris    Anemia of chronic disease    Essential hypertension    Morbid obesity (HCC)    S/P CABG x 3    HLD (hyperlipidemia)    Severe obstructive sleep apnea    Obesity, Class III, BMI 40-49.9 (morbid obesity) ([facility])    CKD (chronic kidney disease)    PAF (paroxysmal atrial fibrillation) ([facility]  Resolved Problems:    * No resolved hospital problems.  *      Assessment:  Acute on chronic diastolic heart failure  CAD s/p CABG x3  PAF; anticoagulation was discussed during his last admission, patient declines anticoagulation is willing to accept to stroke risk per year  Moderate aortic stenosis  MARGARITO on CKD  Acute on chronic hypoxemic respiratory failure  Severe HENNA  Diabetes  HLD  HTN  Anemia, patient is Restorationist    Plan:  Continue aspirin statin  Continue daily weights, daily BMP, strict I's and O's  Metolazone per nephrology  Lasix drip per nephrology; may need to increase Lasix drip tomorrow pending urine output response and renal function  Continue carvedilol 3.125 mg p.o. twice daily  Discontinue isosorbide given lower blood pressures  Discontinue p.o. iron and will give IV iron while here x3 doses      Vikash Cade, APRN - CNP,  4/27/2022, 5:16 PM

## 2022-04-27 NOTE — PROGRESS NOTES
Occupational Therapy  Facility/Department: [facility] A2 CARD TELEMETRY  Daily Treatment Note  NAME: Melva Posada  : 1955  MRN: [id number]    Date of Service: 2022    Discharge Recommendations:  Home with Home health OT,Home with assist PRN  OT Equipment Recommendations  Equipment Needed: No (CTA)      Patient Diagnosis(es): The primary encounter diagnosis was Acute respiratory failure with hypoxia ([facility] Diagnoses of Acute on chronic diastolic congestive heart failure (HCC) and MARGARITO (acute kidney injury) ([facility] were also pertinent to this visit. Assessment     Restrictions: 4L O2, IV lines, tele, general precautions, fall risk    Performance deficits / Impairments: Decreased functional mobility ; Decreased safe awareness;Decreased balance;Decreased ADL status; Decreased endurance;Decreased strength  Assessment: Melva Posada is progressing in therapy steadily and is currently limited by generalized deconditioning. Demo'd fair safety awareness throughout session.  Functional Mobility: Required SBA for functional transfers with Standard Donnamae Jay   ADL: dependent for LB Dressing with sock exchange while Seated in chair   TA: dynamic balance exercises while in stance with SW, see below  Activity was tolerated fairly well, pt required rest break after household distance of ambulation, desat to 87% on 4L with edu/demo of PLB, return to [facility] within 30-60 seconds. Continue OT POC to address performance deficits in ADLs and functional mobility. Recommend Home with PRN Assist  and HHOT upon d/c. Activity Tolerance: Patient limited by endurance  Discharge Recommendations: Home with Home health OT; Home with assist PRN  Equipment Needed: No (CTA)      Plan   Plan  Times per Week: 3-5x's a week while in acute care  Current Treatment Recommendations: Balance training;Functional mobility training; Safety education & training;Patient/Caregiver education & training;Equipment evaluation, education, & procurement;Positioning;Self-Care / ADL; Home management training     Subjective   Subjective  Subjective: Pt sitting EOB upon arrival, agreeable to OT  Pain: did not endorse pain  Cognition  Overall Cognitive Status: WFL  Cognition Comment: cues to redirect        Objective    Vitals  Vitals  Pulse: 57  Heart Rate Source: Monitor  BP: (!) 112/57  BP Location: Left upper arm  BP Method: Automatic  Patient Position: Sitting  MAP (Calculated): 75.33  SpO2: 95 %  O2 Device: Nasal cannula (4L)  Bed Mobility Training  Bed Mobility Training: No  Balance  Sitting: Intact  Standing: With support (SW)  Transfer Training  Transfer Training: Yes  Overall Level of Assistance: Stand-by assistance  Interventions: Verbal cues  Sit to Stand: Stand-by assistance  Stand to Sit: Stand-by assistance  Bed to Chair: Stand-by assistance (SW)     ADL  LE Dressing: Dependent/Total (sock exchange)  Toileting:  (use of bucket while sitting at chair)  OT Exercises  Exercise Treatment: Functional mobility: household distance with standard walker at pt's request with SBA  Dynamic Standing Balance Exercises: At standard walker: BUEx5 shoulder flexion to lift hand to touch target at midline, BUE x5 shoulder shrugs, BUE x5 scapular retraction  Breathing Techniques: PLB upon exertion        Patient Education  Education Given To: Patient  Education Provided: Role of Therapy; ADL Adaptive Strategies; Plan of Care;Transfer Training;Energy Conservation  Education Provided Comments: CHF disease specific education: reinforcement of 5 P's of energy conservation (plan, Pursed lip breathing, pace, etc)  Education Method: Verbal  Barriers to Learning: None  Education Outcome: Verbalized understanding;Demonstrated understanding      AM-PAC Daily Activity Inpatient      How much help for putting on and taking off regular lower body clothing? A Lot   How much help for Bathing? A Lot   How much help for Toileting?  A Lot   How much help for putting on and taking off regular upper body clothing? A Little   How much help for taking care of personal grooming? A Little   How much help for eating meals? A Little   AM-PAC Inpatient Daily Activity Raw Score 15   AM-PAC Inpatient ADL T-Scale Score  34.69   ADL Inpatient CMS 0-100% Score 56.46   ADL Inpatient CMS G-Code Modifier  CK       Goals  Short Term Goals  Time Frame for Short term goals: 1 week 5/4  Short Term Goal 1: Pt will complete toilet transfers with SBA by 5/1- ongoing 4/27  Short Term Goal 2: Pt will complete standing level ADLs with supervision- ongoing 4/27  Short Term Goal 3: Pt will complete LE dressing with Gustavo- ongoing 4/27  Patient Goals   Patient goals : \"to get stronger\"       Therapy Time   Individual Concurrent Group Co-treatment   Time In 1419         Time Out 2358         Minutes 34         Timed Code Treatment Minutes: 34 Minutes     If pt is unable to be seen after this session, please let this note serve as discharge summary. Please see case management note for discharge disposition. Thank you.     Shelly Jasso OT

## 2022-04-28 ENCOUNTER — TELEPHONE (OUTPATIENT)
Dept: ENT CLINIC | Age: 67
End: 2022-04-28

## 2022-04-28 LAB
ANION GAP SERPL CALCULATED.3IONS-SCNC: 9 MMOL/L (ref 3–16)
BUN BLDV-MCNC: 84 MG/DL (ref 7–20)
CALCIUM SERPL-MCNC: 9.9 MG/DL (ref 8.3–10.6)
CHLORIDE BLD-SCNC: 96 MMOL/L (ref 99–110)
CO2: 33 MMOL/L (ref 21–32)
CREAT SERPL-MCNC: 1.6 MG/DL (ref 0.8–1.3)
GFR AFRICAN AMERICAN: 52
GFR NON-AFRICAN AMERICAN: 43
GLUCOSE BLD-MCNC: 258 MG/DL (ref 70–99)
GLUCOSE BLD-MCNC: 263 MG/DL (ref 70–99)
GLUCOSE BLD-MCNC: 293 MG/DL (ref 70–99)
GLUCOSE BLD-MCNC: 297 MG/DL (ref 70–99)
GLUCOSE BLD-MCNC: 333 MG/DL (ref 70–99)
MAGNESIUM: 2.7 MG/DL (ref 1.8–2.4)
PERFORMED ON: ABNORMAL
POTASSIUM SERPL-SCNC: 4.3 MMOL/L (ref 3.5–5.1)
PRO-BNP: 4316 PG/ML (ref 0–124)
SODIUM BLD-SCNC: 138 MMOL/L (ref 136–145)

## 2022-04-28 PROCEDURE — 83880 ASSAY OF NATRIURETIC PEPTIDE: CPT

## 2022-04-28 PROCEDURE — 94761 N-INVAS EAR/PLS OXIMETRY MLT: CPT

## 2022-04-28 PROCEDURE — 36415 COLL VENOUS BLD VENIPUNCTURE: CPT

## 2022-04-28 PROCEDURE — 80048 BASIC METABOLIC PNL TOTAL CA: CPT

## 2022-04-28 PROCEDURE — 6360000002 HC RX W HCPCS: Performed by: NURSE PRACTITIONER

## 2022-04-28 PROCEDURE — 6370000000 HC RX 637 (ALT 250 FOR IP): Performed by: NURSE PRACTITIONER

## 2022-04-28 PROCEDURE — 2580000003 HC RX 258: Performed by: INTERNAL MEDICINE

## 2022-04-28 PROCEDURE — 6370000000 HC RX 637 (ALT 250 FOR IP): Performed by: INTERNAL MEDICINE

## 2022-04-28 PROCEDURE — 99233 SBSQ HOSP IP/OBS HIGH 50: CPT | Performed by: NURSE PRACTITIONER

## 2022-04-28 PROCEDURE — 1200000000 HC SEMI PRIVATE

## 2022-04-28 PROCEDURE — 6360000002 HC RX W HCPCS: Performed by: INTERNAL MEDICINE

## 2022-04-28 PROCEDURE — 2580000003 HC RX 258: Performed by: NURSE PRACTITIONER

## 2022-04-28 PROCEDURE — 2700000000 HC OXYGEN THERAPY PER DAY

## 2022-04-28 PROCEDURE — 97110 THERAPEUTIC EXERCISES: CPT

## 2022-04-28 PROCEDURE — 83735 ASSAY OF MAGNESIUM: CPT

## 2022-04-28 RX ORDER — METOLAZONE 2.5 MG/1
10 TABLET ORAL ONCE
Status: COMPLETED | OUTPATIENT
Start: 2022-04-28 | End: 2022-04-28

## 2022-04-28 RX ADMIN — INSULIN LISPRO 6 UNITS: 100 INJECTION, SOLUTION INTRAVENOUS; SUBCUTANEOUS at 12:36

## 2022-04-28 RX ADMIN — METOLAZONE 10 MG: 2.5 TABLET ORAL at 17:24

## 2022-04-28 RX ADMIN — MICONAZOLE NITRATE: 2 POWDER TOPICAL at 12:38

## 2022-04-28 RX ADMIN — SODIUM CHLORIDE, PRESERVATIVE FREE 10 ML: 5 INJECTION INTRAVENOUS at 10:05

## 2022-04-28 RX ADMIN — OXYCODONE AND ACETAMINOPHEN 1 TABLET: 5; 325 TABLET ORAL at 23:38

## 2022-04-28 RX ADMIN — PANTOPRAZOLE SODIUM 20 MG: 20 TABLET, DELAYED RELEASE ORAL at 17:18

## 2022-04-28 RX ADMIN — FUROSEMIDE 15 MG/HR: 10 INJECTION INTRAMUSCULAR; INTRAVENOUS at 18:00

## 2022-04-28 RX ADMIN — INSULIN LISPRO 6 UNITS: 100 INJECTION, SOLUTION INTRAVENOUS; SUBCUTANEOUS at 10:00

## 2022-04-28 RX ADMIN — OXYCODONE AND ACETAMINOPHEN 1 TABLET: 5; 325 TABLET ORAL at 17:24

## 2022-04-28 RX ADMIN — FUROSEMIDE 10 MG/HR: 10 INJECTION INTRAMUSCULAR; INTRAVENOUS at 10:08

## 2022-04-28 RX ADMIN — POLYETHYLENE GLYCOL 3350 17 G: 17 POWDER, FOR SOLUTION ORAL at 22:26

## 2022-04-28 RX ADMIN — PREGABALIN 25 MG: 25 CAPSULE ORAL at 09:59

## 2022-04-28 RX ADMIN — SENNOSIDES 8.6 MG: 8.6 TABLET, COATED ORAL at 09:59

## 2022-04-28 RX ADMIN — IRON SUCROSE 200 MG: 20 INJECTION, SOLUTION INTRAVENOUS at 18:17

## 2022-04-28 RX ADMIN — INSULIN LISPRO 6 UNITS: 100 INJECTION, SOLUTION INTRAVENOUS; SUBCUTANEOUS at 17:18

## 2022-04-28 RX ADMIN — ALLOPURINOL 300 MG: 300 TABLET ORAL at 09:59

## 2022-04-28 RX ADMIN — OXYCODONE AND ACETAMINOPHEN 1 TABLET: 5; 325 TABLET ORAL at 10:19

## 2022-04-28 RX ADMIN — DOCUSATE SODIUM 100 MG: 100 CAPSULE, LIQUID FILLED ORAL at 09:59

## 2022-04-28 RX ADMIN — PANTOPRAZOLE SODIUM 20 MG: 20 TABLET, DELAYED RELEASE ORAL at 06:21

## 2022-04-28 RX ADMIN — ATORVASTATIN CALCIUM 80 MG: 80 TABLET, FILM COATED ORAL at 21:08

## 2022-04-28 RX ADMIN — CARVEDILOL 3.12 MG: 3.12 TABLET, FILM COATED ORAL at 09:59

## 2022-04-28 RX ADMIN — MICONAZOLE NITRATE: 2 POWDER TOPICAL at 21:09

## 2022-04-28 RX ADMIN — INSULIN LISPRO 4 UNITS: 100 INJECTION, SOLUTION INTRAVENOUS; SUBCUTANEOUS at 21:08

## 2022-04-28 RX ADMIN — TAMSULOSIN HYDROCHLORIDE 0.4 MG: 0.4 CAPSULE ORAL at 06:21

## 2022-04-28 RX ADMIN — DOCUSATE SODIUM 100 MG: 100 CAPSULE, LIQUID FILLED ORAL at 21:08

## 2022-04-28 RX ADMIN — ASPIRIN 81 MG 81 MG: 81 TABLET ORAL at 09:59

## 2022-04-28 RX ADMIN — PREGABALIN 25 MG: 25 CAPSULE ORAL at 21:08

## 2022-04-28 RX ADMIN — INSULIN GLARGINE 35 UNITS: 100 INJECTION, SOLUTION SUBCUTANEOUS at 10:01

## 2022-04-28 RX ADMIN — SENNOSIDES 8.6 MG: 8.6 TABLET, COATED ORAL at 21:08

## 2022-04-28 ASSESSMENT — PAIN SCALES - GENERAL
PAINLEVEL_OUTOF10: 4
PAINLEVEL_OUTOF10: 0
PAINLEVEL_OUTOF10: 7
PAINLEVEL_OUTOF10: 0
PAINLEVEL_OUTOF10: 8
PAINLEVEL_OUTOF10: 8

## 2022-04-28 ASSESSMENT — PAIN DESCRIPTION - LOCATION: LOCATION: FOOT

## 2022-04-28 NOTE — PROGRESS NOTES
Nutrition Assessment     Type and Reason for Visit: Initial,RD Nutrition Re-Screen/LOS    Nutrition Recommendations/Plan:   1. Continue carb control, 2 gm sodium diet with 1500 mL FR      Malnutrition Assessment:  Malnutrition Status: No malnutrition    Nutrition Assessment:  Pt nutritionally stable AEB good appetite and PO intakes of % since admission. Blood sugars elevated, on carb control diet. Continues on IV lasix, on 2 gm sodium diet with fluid restriction. Encouraged continued good PO intakes. Denies any further questions on low sodium diet. Will continue to monitor. Nutrition Related Findings:   BM x1 on 4/26. Active BS. +3 BLE edema. Wound Type: Venous Stasis    Current Nutrition Therapies:    ADULT DIET; Regular; 4 carb choices (60 gm/meal);  Low Sodium (2 gm); 1500 ml    Anthropometric Measures:  · Height: 5' 9\" (175.3 cm)  · Current Body Wt: 317 lb (143.8 kg)   · BMI: 46.8    Nutrition Diagnosis:   No nutrition diagnosis at this time     Nutrition Interventions:   Food and/or Nutrient Delivery: Continue Current Diet  Nutrition Education/Counseling: Education completed  Coordination of Nutrition Care: Continue to monitor while inpatient  Plan of Care discussed with: Patient             Discharge Planning:    Continue current diet     Natacha Rizzo [address], [address],   Contact: [phone]

## 2022-04-28 NOTE — PROGRESS NOTES
Physical Therapy  Facility/Department: [facility] A2 CARD TELEMETRY  Daily Treatment Note  NAME: Katerina Cary  : 1955  MRN: [id number]    Date of Service: 2022    Discharge Recommendations:  Home with assist PRN,Home with Home health PT   PT Equipment Recommendations  Equipment Needed: No    Patient Diagnosis(es): The primary encounter diagnosis was Acute respiratory failure with hypoxia ([facility] [address] Diagnoses of Acute on chronic diastolic congestive heart failure (HCC) and MARGARTIO (acute kidney injury) ([facility] [address] were also pertinent to this visit. Assessment   Assessment: Pt seen this date for transfer training and BLE therex. Pt performs functional mobility with SBA this date; limited due to shortness of breath. Pt performs 10 reps of combined seated and standing exercises. Pt would benefit from continued skilled PT to address deficits listed above. Activity Tolerance: Patient limited by endurance  Equipment Needed: No     Plan    Plan  Plan: 3-5 times per week  Specific Instructions for Next Treatment: Progress ther ex and mobility as tolerated  Current Treatment Recommendations: Strengthening;Balance training;Functional mobility training;Transfer training; Endurance training;Gait training;Home exercise program;Safety education & training     Subjective    Subjective  Subjective: Pt sitting up in chair with sister present, agreeable to PT tx  Pain: Denies  Orientation  Overall Orientation Status: Within Normal Limits  Cognition  Overall Cognitive Status: WFL  Cognition Comment: cues to redirect     Objective      Bed Mobility Training  Bed Mobility Training: No (Pt up in chair at beginning and end of session)  Balance  Sitting: Intact  Standing: With support (SW)  Transfer Training  Transfer Training: Yes  Overall Level of Assistance: Stand-by assistance  Interventions: Verbal cues (for proper use of RW)  Sit to Stand: Stand-by assistance  Stand to Sit: Stand-by assistance     PT Exercises  A/AROM Exercises: Pt performed seated LAQ, ankle pumps x10 reps; standing marches, ankle pumps (poor ROM/strength) with BUE support on SW; Repeated STS x5 reps        Patient Education  Education Given To: Patient  Education Provided: Role of Therapy;Plan of Care;Transfer Training  Education Provided Comments: Pt educated on technique with exercises performed this date  Education Method: Demonstration;Verbal  Education Outcome: Verbalized understanding;Demonstrated understanding;Continued education needed     [facility] 6 Clicks Inpatient Mobility:  AM-PAC Mobility Inpatient   How much difficulty turning over in bed?: None  How much difficulty sitting down on / standing up from a chair with arms?: A Little  How much difficulty moving from lying on back to sitting on side of bed?: None  How much help from another person moving to and from a bed to a chair?: A Little  How much help from another person needed to walk in hospital room?: A Little  How much help from another person for climbing 3-5 steps with a railing?: A Lot  AM-PAC Inpatient Mobility Raw Score : 19  AM-PAC Inpatient T-Scale Score : 45.44  Mobility Inpatient CMS 0-100% Score: 41.77  Mobility Inpatient CMS G-Code Modifier : CK      Goals  Short Term Goals  Time Frame for Short term goals: 1 week, 5/03/22 (unless otherwise specified)  Short term goal 1: Pt will transfer supine <-> sit with modified(I)--4/28 NT  Short term goal 2: Pt will transfer sit <-> stand and bed>chair using RW with supervision--4/28 SBA  Short term goal 3: Pt will ambulate x 125 feet using RW with supervision--4/28 NT  Short term goal 4: By 4/29/22: Pt will tolerate 12-15 reps BLE exercise for strengthening, balance, and endurance--4/28 10 reps  Patient Goals   Patient goals :  \"To get stronger and walk in the hallway\"      Therapy Time   Individual Concurrent Group Co-treatment   Time In 1630         Time Out 1653         Minutes 23         Timed Code Treatment Minutes: 23 Minutes     If pt is unable to be seen after this session, please let this note serve as discharge summary. Please see case management note for discharge disposition. Thank you.     Ran Rob, PT

## 2022-04-28 NOTE — PROGRESS NOTES
[facility] Progress Note           Subjective/   75y.o. year old male who we are seeing in consultation for MARGARITO/CKD-3. HPI:  Renal function stable with diuresis, urine output of 550 ml over last 24 hours. States still short of breath, oxygen at 4-5 L NC.    ROS:  Intake adequate, +weak. Objective/   GEN:  Chronically ill, /60   Pulse 65   Temp 97.5 °F (36.4 °C)   Resp 20   Ht 5' 9\" (1.753 m)   Wt (!) 317 lb 12.8 oz (144.2 kg)   SpO2 93%   BMI 46.93 kg/m²   HEENT: non-icteric, no JVD  CV: S1, S2 without m/r/g; +++ LE edema  RESP: CTA B without w/r/r; breathing wnl  ABD: +bs, soft, nt, no hsm  SKIN: warm, no rashes    Data/  No results for input(s): WBC, HGB, HCT, MCV, PLT in the last 72 hours. Recent Labs     04/26/22  0743 04/27/22  0831 04/28/22  0610    137 138   K 4.6 4.1 4.3   CL 95* 95* 96*   CO2 32 31 33*   GLUCOSE 259* 309* 297*   MG 2.80* 2.70* 2.70*   BUN 84* 77* 84*   CREATININE 1.8* 1.6* 1.6*   LABGLOM 38* 43* 43*   GFRAA 46* 52* 52*       Assessment/     - Acute on chronic kidney disease stage 3              Worsening in setting of CHF decompensation, hx of volatile fluctuations in levels              based on volume status & diuretic use              Background DM Nephropathy + Cardiorenal syndrome              Baseline SCr mid 1 range, 1.5-1. 7              Follows with Dr. Kumar Orlando in office              Improving with diuresis in past                - dCHF exacerbation     - DM2 - poorly controlled     - Normocytic anemia, chronic - Hb at target     - Judaism    Plan/     - Continue lasix drip 10 mg/hour, prn dose of metolazone today, likely to need scheduled dose of metolazone to help with fluid control  - Discussed possibility of ultrafiltration treatments for further fluid removal - patient not excited about the idea as he does not think he could do long-term  - Trend labs, bp's, weights, & urine output     Overall prognosis ricardo    ____________________________________  Tereza Henry MD  [facility]  [email or web address]  Office: [phone]

## 2022-04-28 NOTE — TELEPHONE ENCOUNTER
Received another call from this patient advising that he is still admitted to [facility].     Patient states that he is still having a lot of trouble with his ears and his sinuses. Patient states that it is very difficult for him to get transportation and he would very much appreciate if Dr. Jackelyn Medina would come see him or at least call him while he is still admitted. Patient does not know the phone number to his room, but does know that he is in room 216.

## 2022-04-28 NOTE — PLAN OF CARE
Problem: Pain  Goal: Verbalizes/displays adequate comfort level or baseline comfort level  Outcome: Progressing   Pt will be satisfied with pain control. Pt uses numeric pain rating scale with reassessments after pain med administration. Will continue to monitor progression throughout shift. Problem: Safety - Adult  Goal: Free from fall injury  Outcome: Progressing  Pt high fall risk. Instructed to use call light before getting out of bed. Call light within reach. Bed in low position. Bed alarm on. Will continue to monitor.

## 2022-04-28 NOTE — CARE COORDINATION
Ref made to [facility] for possible LTC transition through [place]. If pt declines, pt can still return to him home w/[facility].   Niurka Kuo RN

## 2022-04-28 NOTE — CONSULTS
[address] [facility] [address] 1955    History:  Past Medical History:   Diagnosis Date    Anemia     Angina     Arthritis     CAD (coronary artery disease)     [facility]congestive [facility] failure) [facility]     CKD (chronic kidney disease) stage 3, GFR 30-59 ml/min ([facility])     Clostridium difficile diarrhea 3/16/12; 2/29/12    positive stool toxin    Diabetes mellitus ([address]     Diabetic neuropathy ([facility] [address]     feet and legs    Disease of blood and blood forming organ     Dizziness     When he moves his head/ loses his balance    GERD (gastroesophageal reflux disease)     gastric ulcer    Headache     Hearing loss     Hyperlipidemia     Hypertension     Kidney stone 2002    Refusal of blood transfusions as patient is Protestant     Sleep apnea     Tinnitus     Venous ulcer ([facility] [address]     LLE    Wound, open 1/13/2012       ECHO:  1/14/22  EF 50%  HgA1c:  4/1/22   7.0  Iron Saturation:  4/1/22  16    ACE/ARB: none due to renal function  BB: Coreg  3.125mg BID    Ferrous Sulfate 325mg BID     Venofer 200mg IV daily    Metolazone 10 mg once    Last Hospital Admission:  3/31/22  Acute on chronic CHF    Discharge plans:  Home vs SNF    Family Present: none  Advanced Directives: patient has advance directives scanned in the chart  Patient's stated goal of care: be more comfortable prior to discharge; long term goals include prevent hospitalizations  Lifestyle goal discussed: compliance, facility placement, palliative care/goals of care    Patient sitting on side of bed at this time on 6 L O2. Pt denies shortness of breath; no complaints of chest pain. Pt states he lives at home alone. Mentioned his diet largely consists of no added salt. Pt states he drinks less than 64 ounces of fluid per day. States he takes all his home medications as prescribed. Patient has a scale at home. Patient denies concerns paying for medications.     Spoke to patient at bedside for CHF education and goals of care discussion. Pt stating he is aware his CHF is worsening and now his kidneys are \"sick\" Pt very clear that due to his Zoroastrian beliefs he would not be open to dialysis. Discussed progression of CHF and that it may be time to look into a facility. Pt verbalized being open to assisted living but then meantioned going to [facility] which is only skilled nursing. Writer explained this at length to patient but he stated that it was ok due to location to the facility. During visit writer had to redirect him several times back to [facility] as he was very focused on events surrounding the ER and admission. Then became focused on [facility]. Writer discussed goals of care and palliative care with patient. He is open to discussions surrounding goals of care and what palliative care could offer. Writer will place consult. Spoke to Debora SUTTON regarding [facility]. She will reach out to facility to enquire about admission. Patient recent weights and intake/output reviewed:    Patient Vitals for the past 96 hrs (Last 3 readings):   Weight   04/28/22 0437 (!) 317 lb 12.8 oz (144.2 kg)   04/27/22 0421 (!) 318 lb 11.2 oz (144.6 kg)   04/26/22 1830 (!) 318 lb 12.8 oz (144.6 kg)         Intake/Output Summary (Last 24 hours) at 4/28/2022 1431  Last data filed at 4/28/2022 1104  Gross per 24 hour   Intake 720 ml   Output 200 ml   Net 520 ml       Notified patient to call the doctor post discharge if he experiences shortness of breath, chest pain, swelling, cough, or weight gain of 2-3 pounds in a day / 5 pounds in a week. Also notified patient to call the doctor if he feels dizzy, increased fatigue, decreased or difficulty urinating. Reviewed the red, yellow, green zones of heart failure self management. Encouraged patient to call the MD with early signs of an acute heart failure exacerbation or when in the yellow zone.  Patient provided with both written and verbal education on CHF signs/symptoms, causes, discharge medications, daily weights, low sodium diet, activity, fluid restriction, and follow-up. Pt verbalized understanding. No additional questions at this time. Stated he will alert his nurse with any questions. PATIENT/CAREGIVER TEACHING:    Level of patient/caregiver understanding able to:   [ María Dacosta Verbalize understanding [ ] Demonstrate understanding [ ] Teach back   [ ] Needs reinforcement [ ] Other:     Education Time: 30 min     Recommendations:   1. Encourage follow-up appointment compliance. 2. Educate further on fluid restriction 48 oz - 64 oz during inpatient stay so he can understand how to measure intake at home. 3. Review sodium restrictions. Encourage patient to not add table salt to his foods and avoid foods that are high in sodium. 4. Continue to educate on S/S. Stress the importance of calling the MD with the earliest signs of an acute exacerbation. 5. Emphasize daily weights - instruct patient to call the MD if he gains 2-3 lb in a day or 5 lb in a week. 6. Provided patient with CHF Resource Line for questions and concerns.        Kevin Goel RN   4/28/2022 2:31 PM

## 2022-04-28 NOTE — PROGRESS NOTES
[address]   Daily Progress Note    Admit Date:  4/24/2022  HPI:    Chief Complaint   Patient presents with    Shortness of Breath     Hx CHF. Feels swollen & SOB. 3-4L @ home. Currently on 6L n/c. Interval history: Car[facility] Cerrato is being followed for CHF. Known to our cardiovascular service. Recently admitted, discharge weight on 4/9/2022 was 287 pounds. Weight this admission at 316 pounds. Subjective:  Mr. Jamal Gay is sleepy today. Up in the chair. Objective:   BP (!) 124/58   Pulse 62   Temp 97.7 °F (36.5 °C) (Oral)   Resp 20   Ht 5' 9\" (1.753 m)   Wt (!) 317 lb 12.8 oz (144.2 kg)   SpO2 95%   BMI 46.93 kg/m²       Intake/Output Summary (Last 24 hours) at 4/28/2022 3362  Last data filed at 4/27/2022 2130  Gross per 24 hour   Intake 480 ml   Output 200 ml   Net 280 ml       NYHA: IV    Physical Exam:  General:  Awake, alert, NAD, obese, sleepy in the chair    Skin:  Warm and dry  Neck:  JVD elevated   Chest:  Dim to auscultation, no wheezes/rhonchi/ + few rales in the right lower base   Telemetry: not on telemetry   Cardiovascular:  RRR S1S2, + murmur, no r/g   Abdomen: nontender, +bowel sounds, tight anasarca   Extremities:  +++ bilateral lower extremity edema up to the thigh edema  Bilateral lower legs wrapped with ace wraps.      Medications:    iron sucrose (VENOFER) iv piggyback 100 mL (Admin over 60 minutes)  200 mg IntraVENous Q24H    allopurinol  300 mg Oral Daily    aspirin  81 mg Oral Daily    atorvastatin  80 mg Oral Nightly    carvedilol  3.125 mg Oral BID WC    docusate sodium  100 mg Oral BID    [Held by provider] ferrous sulfate  325 mg Oral BID    insulin glargine  35 Units SubCUTAneous Daily    insulin lispro  0-12 Units SubCUTAneous TID WC    insulin lispro  0-6 Units SubCUTAneous Nightly    pantoprazole  20 mg Oral BID AC    pregabalin  25 mg Oral BID    senna  1 tablet Oral BID    tamsulosin  0.4 mg Oral Daily    sodium chloride flush  5-40 mL IntraVENous 2 times per day    enoxaparin  30 mg SubCUTAneous BID    miconazole   Topical BID      dextrose      sodium chloride      furosemide (LASIX) 1mg/ml infusion 10 mg/hr (04/27/22 2153)       Lab Data:  CBC:   No results for input(s): WBC, HGB, PLT in the last 72 hours. BMP:    Recent Labs     04/26/22  0743 04/27/22  0831 04/28/22  0610    137 138   K 4.6 4.1 4.3   CO2 32 31 33*   BUN 84* 77* 84*   CREATININE 1.8* 1.6* 1.6*     INR:  No results for input(s): INR in the last 72 hours. BNP:    Recent Labs     04/28/22  0610   PROBNP 4,316*     Lab Results   Component Value Date    LVEF 67 03/10/2022       Testing:    Echocardiogram 1/13/2022   Summary   Limited only f/u for LVEF and aortic valve stenosis. Technically difficult examination. Low normal left ventricular systolic function with ejection fraction of 50%. Moderate aortic stenosis. Aortic stenosis values appear approximately same when compared to echo on   8-. Mild to moderate aortic regurgitation. Principal Problem:    Acute diastolic HF (heart failure) ([facility])  Active Problems:    Elevated troponin    DM (diabetes mellitus) ([facility] [address]    Coronary artery disease involving native coronary artery of native heart without angina pectoris    Anemia of chronic disease    Essential hypertension    Morbid obesity (HCC)    S/P CABG x 3    HLD (hyperlipidemia)    Severe obstructive sleep apnea    Obesity, Class III, BMI 40-49.9 (morbid obesity) ([facility])    CKD (chronic kidney disease)    PAF (paroxysmal atrial fibrillation) ([facility] [address]  Resolved Problems:    * No resolved hospital problems.  *      Assessment:  Acute on chronic diastolic heart failure  CAD s/p CABG x3  PAF; anticoagulation was discussed during his last admission, patient declines anticoagulation is willing to accept to stroke risk per year  Moderate aortic stenosis  MARGARITO on CKD  Acute on chronic hypoxemic respiratory failure  Severe HENNA  Diabetes  HLD  HTN  Anemia, patient is [employer]    Plan:  Continue aspirin statin  Continue daily weights, daily BMP, strict I's and O's  Metolazone 10mg x1 now   Adjust lasix infusion to 15mg/hr for now   Continue carvedilol 3.125 mg p.o. twice daily  Discontinue isosorbide given lower blood pressures  Discontinue p.o. iron and will give IV iron while here x3 doses      PATRICK Ghosh - CNP,  4/28/2022, 2:29 PM

## 2022-04-28 NOTE — PROGRESS NOTES
Hospitalist Progress Note    Date of Admission: 4/24/2022    Chief Complaint: SOB    Hospital Course:   75 y.o. male who [facility] to [facility] with above complaints. Pt with PMH of CAD s/p CABG, diastolic CHF, CKD, HTN, DM 2, HLD, sleep apnea, pulmonary hypertension presented to the ED with complaints of increasing SOB. Has had to go up on his oxygen requirements at home up to 6 L, traditionally uses 3 to 4 L/min. Also reports increased weight gain and worsening leg swelling. Has had severe orthopnea sleeping upright in a recliner every night. Does have some chest heaviness. Feels like his abdomen is swollen. Has early satiety. Pt admits that since being discharged home he has been eating and drinking a lot more when he should not have been doing that. Pt reports increased dyspnea even with minimal activity. Denies any palpitations, nausea vomiting or abdominal pain. Reports he has been compliant with his diuretics at home. He was recently admitted to [facility] for CHF exacerbation earlier this month. He is a Roman [employer] and will not accept blood products. On discharge on 4/9/2022 he weighed 287 pounds, on re-admission weighed 316 pounds. Subjective:   Pt is on 6 LPM. Afebrile. VSS. Weight is 317 lbs today. On lasix gtt. Labs:   No results for input(s): WBC, HGB, HCT, PLT in the last 72 hours. Recent Labs     04/26/22  0743 04/27/22  0831 04/28/22  0610    137 138   K 4.6 4.1 4.3   CL 95* 95* 96*   CO2 32 31 33*   BUN 84* 77* 84*   CREATININE 1.8* 1.6* 1.6*   CALCIUM 10.4 9.8 9.9     Physical Exam Performed:    BP (!) 124/58   Pulse 62   Temp 97.7 °F (36.5 °C) (Oral)   Resp 20   Ht 5' 9\" (1.753 m)   Wt (!) 317 lb 12.8 oz (144.2 kg)   SpO2 95%   BMI 46.93 kg/m²      General appearance: Very obese, no apparent distress, appears stated age and cooperative. Morbidly obese. HEENT:  Normal cephalic, atraumatic without obvious deformity. Pupils equal, round, and reactive to light.   Extra ocular muscles intact. Conjunctivae/corneas clear. Neck: Supple, with full range of motion. JVP elevated. Trachea midline. Respiratory:  Normal respiratory effort. Clear to auscultation, bilaterally without Rales/Wheezes/Rhonchi. Cardiovascular:  Regular rate and rhythm with normal S1/S2 without murmurs, rubs or gallops. Abdomen: Soft, non-tender, distended with fluid thrill +, with normal bowel sounds. Musculoskeletal:  No clubbing, cyanosis  bilaterally. Full range of motion without deformity. BLE edema improving  Skin: Skin color, texture, turgor normal.  No rashes or lesions. Neurologic:  Neurovascularly intact without any focal sensory/motor deficits. Cranial nerves: II-XII intact, grossly non-focal.  Psychiatric:  Alert and oriented, thought content appropriate, normal insight  Capillary Refill: Brisk,3 seconds, normal  Peripheral Pulses: +2 palpable, equal bilaterally       Assessment/Plan:    Active Hospital Problems    Diagnosis     Elevated troponin [R77.8]      Priority: Medium    Acute diastolic HF (heart failure) ([facility]) [I50.31]      Priority: Medium    PAF (paroxysmal atrial fibrillation) ([facility]) [I48.0]     CKD (chronic kidney disease) [N18.9]     Severe obstructive sleep apnea [G47.33]     Obesity, Class III, BMI 40-49.9 (morbid obesity) ([facility] [address] [E66.01]     HLD (hyperlipidemia) [E78.5]     Morbid obesity ([facility] [address] [E66.01]     S/P CABG x 3 [Z95.1]     Essential hypertension [I10]     Anemia of chronic disease [D63.8]     DM (diabetes mellitus) ([facility] [address] [E11.9]     Coronary artery disease involving native coronary artery of native heart without angina pectoris [I25.10]        Acute on chronic diastolic CHF  -30 pound weight gain in the last 2 weeks, BNP elevated 4308, CXR showing CHF changes.  -Continue Lasix gtt per Nephrology/Cardiology. Plan for metolazone again today.   -Strict I's/O and daily weights.   -1500 cc fluid restricted, low-sodium diet.  -Last ECHO in August 2021 showed LVEF 55%, elevated left ventricular diastolic pressures, moderate AS. CAD s/p CABG x3  -Troponin 0.05. Likely due to CHF. Flat trend not consistent with ACS. -Last cath in 2012 showed widely patent grafts. -Continue aspirin, statin, Coreg. Cards is holding imdur. MARGARITO on CKD  -Due to CHF, cardiorenal syndrome.  -Consulted nephrology to assist with management, IV Lasix drip ordered.  -Daily renal panel. Acute on chronic hypoxemic respiratory failure  -Was up to 6 L oxygen, usually on 3 to 4 L at home.  -Wean as tolerated to maintain sats >92%. Severe HENNA  -Continue home BiPAP. DM type II  -Controlled, A1c 8.5.  -Continue home insulin regimen, added SSI with Accu-Cheks, hypoglycemia protocol.  -Carb-control diet.      Hyperlipidemia  -Continue statin.      Morbid obesity   -BMI 46.81. Complicating assessment and treatment, placing patient at high risk for multiple co-morbidities as well as early death and contributing to the patient's presentation. Hypertension  -Controlled, continue home medication regimen. Paroxysmal A. Fib  -Controlled on beta-blocker. Not on anticoagulation. [id number] score is 5 placing him at about 7% risk of stroke per year. Pt declines anticoagulation at this time. Anemia of chronic disease  - Hb stable, monitor. Patient is a [employer]. DVT Prophylaxis: Subcu Lovenox  Diet: ADULT DIET; Regular; 4 carb choices (60 gm/meal);  Low Sodium (2 gm); 1500 ml  Code Status: Full Code  PT/OT Eval Status: NA    Dispo - > 2 days    PATRICK Cohen - CNP

## 2022-04-29 LAB
ANION GAP SERPL CALCULATED.3IONS-SCNC: 8 MMOL/L (ref 3–16)
BUN BLDV-MCNC: 86 MG/DL (ref 7–20)
CALCIUM SERPL-MCNC: 10.5 MG/DL (ref 8.3–10.6)
CHLORIDE BLD-SCNC: 95 MMOL/L (ref 99–110)
CO2: 35 MMOL/L (ref 21–32)
CREAT SERPL-MCNC: 1.8 MG/DL (ref 0.8–1.3)
GFR AFRICAN AMERICAN: 46
GFR NON-AFRICAN AMERICAN: 38
GLUCOSE BLD-MCNC: 238 MG/DL (ref 70–99)
GLUCOSE BLD-MCNC: 261 MG/DL (ref 70–99)
GLUCOSE BLD-MCNC: 286 MG/DL (ref 70–99)
GLUCOSE BLD-MCNC: 299 MG/DL (ref 70–99)
GLUCOSE BLD-MCNC: 316 MG/DL (ref 70–99)
MAGNESIUM: 2.4 MG/DL (ref 1.8–2.4)
PERFORMED ON: ABNORMAL
POTASSIUM SERPL-SCNC: 4.2 MMOL/L (ref 3.5–5.1)
SODIUM BLD-SCNC: 138 MMOL/L (ref 136–145)

## 2022-04-29 PROCEDURE — 6370000000 HC RX 637 (ALT 250 FOR IP): Performed by: NURSE PRACTITIONER

## 2022-04-29 PROCEDURE — 94761 N-INVAS EAR/PLS OXIMETRY MLT: CPT

## 2022-04-29 PROCEDURE — 2580000003 HC RX 258: Performed by: NURSE PRACTITIONER

## 2022-04-29 PROCEDURE — 36415 COLL VENOUS BLD VENIPUNCTURE: CPT

## 2022-04-29 PROCEDURE — 83735 ASSAY OF MAGNESIUM: CPT

## 2022-04-29 PROCEDURE — 6370000000 HC RX 637 (ALT 250 FOR IP): Performed by: REGISTERED NURSE

## 2022-04-29 PROCEDURE — 2580000003 HC RX 258: Performed by: INTERNAL MEDICINE

## 2022-04-29 PROCEDURE — 2700000000 HC OXYGEN THERAPY PER DAY

## 2022-04-29 PROCEDURE — 99233 SBSQ HOSP IP/OBS HIGH 50: CPT | Performed by: NURSE PRACTITIONER

## 2022-04-29 PROCEDURE — 6360000002 HC RX W HCPCS: Performed by: NURSE PRACTITIONER

## 2022-04-29 PROCEDURE — 6370000000 HC RX 637 (ALT 250 FOR IP): Performed by: INTERNAL MEDICINE

## 2022-04-29 PROCEDURE — 1200000000 HC SEMI PRIVATE

## 2022-04-29 PROCEDURE — 6360000002 HC RX W HCPCS: Performed by: INTERNAL MEDICINE

## 2022-04-29 PROCEDURE — 80048 BASIC METABOLIC PNL TOTAL CA: CPT

## 2022-04-29 RX ORDER — HEPARIN SODIUM 5000 [USP'U]/ML
5000 INJECTION, SOLUTION INTRAVENOUS; SUBCUTANEOUS EVERY 8 HOURS SCHEDULED
Status: DISCONTINUED | OUTPATIENT
Start: 2022-04-30 | End: 2022-05-03 | Stop reason: HOSPADM

## 2022-04-29 RX ORDER — FAMOTIDINE 20 MG/1
20 TABLET, FILM COATED ORAL DAILY PRN
Status: DISCONTINUED | OUTPATIENT
Start: 2022-04-29 | End: 2022-05-03 | Stop reason: HOSPADM

## 2022-04-29 RX ADMIN — INSULIN LISPRO 4 UNITS: 100 INJECTION, SOLUTION INTRAVENOUS; SUBCUTANEOUS at 20:25

## 2022-04-29 RX ADMIN — SODIUM CHLORIDE, PRESERVATIVE FREE 10 ML: 5 INJECTION INTRAVENOUS at 09:45

## 2022-04-29 RX ADMIN — TAMSULOSIN HYDROCHLORIDE 0.4 MG: 0.4 CAPSULE ORAL at 05:21

## 2022-04-29 RX ADMIN — DOCUSATE SODIUM 100 MG: 100 CAPSULE, LIQUID FILLED ORAL at 20:20

## 2022-04-29 RX ADMIN — INSULIN GLARGINE 35 UNITS: 100 INJECTION, SOLUTION SUBCUTANEOUS at 09:40

## 2022-04-29 RX ADMIN — FUROSEMIDE 15 MG/HR: 10 INJECTION INTRAMUSCULAR; INTRAVENOUS at 01:01

## 2022-04-29 RX ADMIN — PREGABALIN 25 MG: 25 CAPSULE ORAL at 20:20

## 2022-04-29 RX ADMIN — PANTOPRAZOLE SODIUM 20 MG: 20 TABLET, DELAYED RELEASE ORAL at 18:04

## 2022-04-29 RX ADMIN — IRON SUCROSE 200 MG: 20 INJECTION, SOLUTION INTRAVENOUS at 18:48

## 2022-04-29 RX ADMIN — SODIUM CHLORIDE, PRESERVATIVE FREE 10 ML: 5 INJECTION INTRAVENOUS at 20:23

## 2022-04-29 RX ADMIN — INSULIN LISPRO 4 UNITS: 100 INJECTION, SOLUTION INTRAVENOUS; SUBCUTANEOUS at 09:41

## 2022-04-29 RX ADMIN — OXYCODONE AND ACETAMINOPHEN 1 TABLET: 5; 325 TABLET ORAL at 20:21

## 2022-04-29 RX ADMIN — INSULIN LISPRO 6 UNITS: 100 INJECTION, SOLUTION INTRAVENOUS; SUBCUTANEOUS at 12:33

## 2022-04-29 RX ADMIN — PANTOPRAZOLE SODIUM 20 MG: 20 TABLET, DELAYED RELEASE ORAL at 05:21

## 2022-04-29 RX ADMIN — LIDOCAINE HYDROCHLORIDE: 20 SOLUTION ORAL; TOPICAL at 03:06

## 2022-04-29 RX ADMIN — OXYCODONE AND ACETAMINOPHEN 1 TABLET: 5; 325 TABLET ORAL at 09:40

## 2022-04-29 RX ADMIN — ACETAZOLAMIDE 250 MG: 500 INJECTION, POWDER, LYOPHILIZED, FOR SOLUTION INTRAVENOUS at 22:03

## 2022-04-29 RX ADMIN — FUROSEMIDE 15 MG/HR: 10 INJECTION INTRAMUSCULAR; INTRAVENOUS at 12:33

## 2022-04-29 RX ADMIN — CARVEDILOL 3.12 MG: 3.12 TABLET, FILM COATED ORAL at 09:40

## 2022-04-29 RX ADMIN — ATORVASTATIN CALCIUM 80 MG: 80 TABLET, FILM COATED ORAL at 20:20

## 2022-04-29 RX ADMIN — FAMOTIDINE 20 MG: 20 TABLET, FILM COATED ORAL at 12:33

## 2022-04-29 RX ADMIN — ASPIRIN 81 MG 81 MG: 81 TABLET ORAL at 09:40

## 2022-04-29 RX ADMIN — SENNOSIDES 8.6 MG: 8.6 TABLET, COATED ORAL at 20:20

## 2022-04-29 RX ADMIN — ALLOPURINOL 300 MG: 300 TABLET ORAL at 09:40

## 2022-04-29 RX ADMIN — PREGABALIN 25 MG: 25 CAPSULE ORAL at 09:40

## 2022-04-29 RX ADMIN — INSULIN LISPRO 6 UNITS: 100 INJECTION, SOLUTION INTRAVENOUS; SUBCUTANEOUS at 18:03

## 2022-04-29 ASSESSMENT — PAIN DESCRIPTION - LOCATION
LOCATION: GENERALIZED;HEAD
LOCATION: NECK

## 2022-04-29 ASSESSMENT — PAIN DESCRIPTION - DESCRIPTORS: DESCRIPTORS: ACHING

## 2022-04-29 ASSESSMENT — PAIN SCALES - GENERAL
PAINLEVEL_OUTOF10: 0
PAINLEVEL_OUTOF10: 8
PAINLEVEL_OUTOF10: 8

## 2022-04-29 NOTE — CONSULTS
Palliative Care Initial Note  Palliative Care Admit date:  4/29/22  Reason for c/s:  [address]    Advance Directives:  Reviewed the hcpoa pt completed and he designated Leno Zepeda as his healthcare proxy. Pt currently has a full code status. As writer discussed the components of resuscitation w/ pt, he was clear to say that he prefers to forego cpr. He will talk further w/ his hcpoa and [facility] elder re: intubation and apprise team if he wishes to limit that, as well. For now, dnrcc-a is most in line w/ pts wishes. Plan of care/goals:  Extensive d/w pt around his understanding of his status and expectations/goals moving forward. Mr. Ramirez Walnut weighs all healthcare decisions against how they align w/ his Restorationist beliefs and this is of greatest import to him. He verb'd feeling he obtains benefit from coming into the hospital and, while he verb'd feeling his life expectancy may well be limited, he is currently of the desire to weigh all options providers can offer. He stated he will not be able to agree to HD in the future but continues to ponder the ultrafiltration he and Nephrologist discussed. He feels the need for absolutes that \"no blood will mix\" in the process of ultrafiltration and that he won't lose any blood in the process either. Pt went on to acknowledge/reinforce Dr. Byron Vieira documentation that pt feels his reserves for tolerating such a procedure are limited. However, pt was not ready, in today's discussion, to eliminate that option. Social/Spiritual:  PTA, pt lived in an apt though states his preference is for placement upon d/c as he identifies his physical limitations at the present time. While he would like to return home eventually, he stated \"I know I won't get better and I may well need to remain in (long term care). Pt feels he needs to live into time in the ecf before he can make any decisions for the long term.       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 He weighs all decisions against Religious doctrine and is committed to making decisions which are in accord w/ Religious teachings. Plan: amend code status to [facility]. [facility] dnr form on A2, awaiting provider sig.    Pt verb'd anticipating that he will d/c to [facility]; writer apprised CM        Reason for consult:  _X_ Advance Care Planning  ___ Transition of Care Planning  ___ Psychosocial/Spiritual Support  ___ Symptom Management                                                                                                                                                                                                                                                                                                                              Juan Garcia RN

## 2022-04-29 NOTE — PROGRESS NOTES
[facility] Progress Note           Subjective/   75y.o. year old male who we are seeing in consultation for MARGARITO/CKD-3. HPI:  Renal function stable with diuresis, urine output of 1.4 liters over last 24 hours. States still short of breath, oxygen at 4-5 L NC.    ROS:  Intake adequate, +weak. Objective/   GEN:  Chronically ill, /65   Pulse 66   Temp 97.7 °F (36.5 °C) (Oral)   Resp 20   Ht 5' 9\" (1.753 m)   Wt (!) 704 lb 5.9 oz (319.5 kg)   SpO2 94%   .02 kg/m²   HEENT: non-icteric, no JVD  CV: S1, S2 without m/r/g; +++ LE edema  RESP: CTA B without w/r/r; breathing wnl  ABD: +bs, soft, nt, no hsm  SKIN: warm, no rashes    Data/  No results for input(s): WBC, HGB, HCT, MCV, PLT in the last 72 hours. Recent Labs     04/26/22  0743 04/27/22  0831 04/28/22  0610    137 138   K 4.6 4.1 4.3   CL 95* 95* 96*   CO2 32 31 33*   GLUCOSE 259* 309* 297*   MG 2.80* 2.70* 2.70*   BUN 84* 77* 84*   CREATININE 1.8* 1.6* 1.6*   LABGLOM 38* 43* 43*   GFRAA 46* 52* 52*       Assessment/     - Acute on chronic kidney disease stage 3              Worsening in setting of CHF decompensation, hx of volatile fluctuations in levels              based on volume status & diuretic use              Background DM Nephropathy + Cardiorenal syndrome              Baseline SCr mid 1 range, 1.5-1. 7              Follows with Dr. Sunil Jenkins in office              Improving with diuresis in past                - dCHF exacerbation     - DM2 - poorly controlled     - Normocytic anemia, chronic - Hb at target     - Faith    Plan/     - Hold lasix drip tonight   - Dose of diamox IV today  - Discussed possibility of ultrafiltration treatments for further fluid removal - patient not excited about the idea as he does not think he could do long-term  - Trend labs, bp's, weights, & urine output     Overall prognosis guarded    ____________________________________  Shade Belt, MD  [facility]  [email or web address]  Office: [phone]

## 2022-04-29 NOTE — PLAN OF CARE
Problem: Pain  Goal: Verbalizes/displays adequate comfort level or baseline comfort level  Outcome: Progressing   Pt will be satisfied with pain control. Pt uses numeric pain rating scale with reassessments after pain med administration. Will continue to monitor progression throughout shift. Problem: Safety - Adult  Goal: Free from fall injury  Outcome: Progressing   Pt will remain free from falls throughout hospital stay. Fall precautions in place, bed alarm on, bed in lowest position with wheels locked and side rails 2/4 up. Room door open and hourly rounding completed. Will continue to monitor throughout shift.

## 2022-04-29 NOTE — PLAN OF CARE
Patient's EF (Ejection Fraction) is greater than 40%    Heart Failure Medications:   Diuretics[de-identified] Furosemide     (One of the following REQUIRED for EF </= 40%/SYSTOLIC FAILURE but MAY be used in EF% >40%/DIASTOLIC FAILURE)        ACE[de-identified] None        ARB[de-identified] None         ARNI[de-identified] None    (Beta Blockers)   NON- Evidenced Based Beta Blocker (for EF% >40%/DIASTOLIC FAILURE): None     Evidenced Based Beta Blocker::(REQUIRED for EF% <40%/SYSTOLIC FAILURE) Metoprolol SUCCinate- Toprol XL  . .................................................................................................................................................. Patient's weights and intake/output reviewed: Yes    Patient's Last Weight: 317 lbs obtained by standing scale. Difference of 1 lbs less than last documented weight. Intake/Output Summary (Last 24 hours) at 4/28/2022 4791  Last data filed at 4/28/2022 2250  Gross per 24 hour   Intake 720 ml   Output 1425 ml   Net -705 ml       Comorbidities Reviewed Yes    Patient has a past medical history of Anemia, Angina, Arthritis, CAD (coronary artery disease), CHF (congestive heart failure) ([address] CKD (chronic kidney disease) stage 3, GFR 30-59 ml/min ([facility]), Clostridium difficile diarrhea, Diabetes mellitus ([address] Diabetic neuropathy ([address] Disease of blood and blood forming organ, Dizziness, GERD (gastroesophageal reflux disease), Headache, Hearing loss, Hyperlipidemia, Hypertension, Kidney stone, Refusal of blood transfusions as patient is Jehovah's witness, Sleep apnea, Tinnitus, Venous ulcer ([address] and Wound, open. >>For CHF and Comorbidity documentation on Education Time and Topics, please see Education Tab    Progressive Mobility Assessment:  What is this patient's Current Level of Mobility?: Ambulatory- with Assistance  How was this patient Mobilized today?: Edge of Bed, Up to Chair,  Up to Toilet/Shower and Up in Room, ambulated 20 ft                 With Whom?  Self Level of Difficulty/Assistance: Independent     Pt sitting in bed at this time on 6 L O2. Pt denies shortness of breath. Pt with pitting lower extremity edema.      Patient and/or Family's stated Goal of Care this Admission: reduce shortness of breath, increase activity tolerance, better understand heart failure and disease management, be more comfortable and reduce lower extremity edema prior to discharge        :

## 2022-04-29 NOTE — PROGRESS NOTES
[address]   Daily Progress Note    Admit Date:  4/24/2022  HPI:    Chief Complaint   Patient presents with    Shortness of Breath     Hx CHF. Feels swollen & SOB. 3-4L @ home. Currently on 6L n/c. Interval history: Cari Ricks is being followed for CHF. Known to our cardiovascular service. Recently admitted, discharge weight on 4/9/2022 was 287 pounds. Weight this admission at 316 pounds. Started on lasix infusion. Subjective:  Mr. Travis Sanders is frustrated with the lack of diuresis. Objective:   BP (!) 127/93   Pulse 69   Temp 98.1 °F (36.7 °C)   Resp 16   Ht 5' 9\" (1.753 m)   Wt (!) 317 lb 12.8 oz (144.2 kg)   SpO2 97%   BMI 46.93 kg/m²       Intake/Output Summary (Last 24 hours) at 4/29/2022 1135  Last data filed at 4/28/2022 2250  Gross per 24 hour   Intake 480 ml   Output 1425 ml   Net -945 ml       NYHA: IV    Physical Exam:  General:  Awake, alert, NAD, obese, in the chair    Skin:  Warm and dry  Neck:  JVD elevated   Chest:  Dim to auscultation, no wheezes/rhonchi/no  rales in the right lower base   Telemetry: not on telemetry   Cardiovascular:  RRR S1S2, + murmur, no r/g   Abdomen: nontender, +bowel sounds, tight anasarca   Extremities:  +++ bilateral lower extremity edema up to the thigh edema  Bilateral lower legs wrapped with ace wraps.      Medications:    [START ON 4/30/2022] heparin (porcine)  5,000 Units SubCUTAneous 3 times per day    iron sucrose (VENOFER) iv piggyback 100 mL (Admin over 60 minutes)  200 mg IntraVENous Q24H    allopurinol  300 mg Oral Daily    aspirin  81 mg Oral Daily    atorvastatin  80 mg Oral Nightly    carvedilol  3.125 mg Oral BID WC    docusate sodium  100 mg Oral BID    [Held by provider] ferrous sulfate  325 mg Oral BID    insulin glargine  35 Units SubCUTAneous Daily    insulin lispro  0-12 Units SubCUTAneous TID WC    insulin lispro  0-6 Units SubCUTAneous Nightly    pantoprazole  20 mg Oral BID AC    pregabalin  25 mg Oral BID    senna  1 tablet Oral BID    tamsulosin  0.4 mg Oral Daily    sodium chloride flush  5-40 mL IntraVENous 2 times per day    miconazole   Topical BID      dextrose      sodium chloride      furosemide (LASIX) 1mg/ml infusion 15 mg/hr (04/29/22 0101)       Lab Data:  CBC:   No results for input(s): WBC, HGB, PLT in the last 72 hours. BMP:    Recent Labs     04/27/22  0831 04/28/22  0610 04/29/22  0705    138 138   K 4.1 4.3 4.2   CO2 31 33* 35*   BUN 77* 84* 86*   CREATININE 1.6* 1.6* 1.8*     INR:  No results for input(s): INR in the last 72 hours. BNP:    Recent Labs     04/28/22  0610   PROBNP 4,316*     Lab Results   Component Value Date    LVEF 67 03/10/2022       Testing:    Echocardiogram 1/13/2022   Summary   Limited only f/u for LVEF and aortic valve stenosis. Technically difficult examination. Low normal left ventricular systolic function with ejection fraction of 50%. Moderate aortic stenosis. Aortic stenosis values appear approximately same when compared to echo on   8-. Mild to moderate aortic regurgitation. Principal Problem:    Acute diastolic HF (heart failure) ([facility])  Active Problems:    Elevated troponin    DM (diabetes mellitus) ([facility]    Coronary artery disease involving native coronary artery of native heart without angina pectoris    Anemia of chronic disease    Essential hypertension    Morbid obesity (HCC)    S/P CABG x 3    HLD (hyperlipidemia)    Severe obstructive sleep apnea    Obesity, Class III, BMI 40-49.9 (morbid obesity) ([facility])    CKD (chronic kidney disease)    PAF (paroxysmal atrial fibrillation) ([facility]  Resolved Problems:    * No resolved hospital problems.  *      Assessment:  Acute on chronic diastolic heart failure  CAD s/p CABG x3  PAF; anticoagulation was discussed during his last admission, patient declines anticoagulation is willing to accept to stroke risk per year  Moderate aortic stenosis  MARGARITO on CKD  Acute on chronic hypoxemic respiratory failure  Severe HENNA  Diabetes  HLD  HTN  Anemia, patient is Mosque    Plan:  Continue aspirin statin  Continue daily weights, daily BMP, strict I's and O's  lasix infusion to 15mg/hr-we will defer further diuresis to nephrology   Continue carvedilol 3.125 mg p.o. twice daily  IV iron     Cardiology team will be available if needed, otherwise will defer to further diuresis to nephrology over the weekend.   We will see again on Monday    PATRICK Dodd CNP,  4/29/2022, 2:01 PM

## 2022-04-29 NOTE — CARE COORDINATION
Addendum:  [facility] able to accept; Pt will need lashell bed, which will be ordered. If pt does not have home cpap, one will need to be ordered, as well. Maurilio Hein RN          Pt continues to diurese. Pt is from home, active w/[facility] and [facility]. Referral made to [facility] for possible LTC. They are considering pt still and are checking on long term beds. CM will continue to follow for needs.   Maurilio Hein RN

## 2022-04-29 NOTE — PROGRESS NOTES
Physical Therapy  Attempted PT tx session: Pt encountered standing to urinate. Declines participation in therapy at this time secondary to fatigue and not getting sleep overnight. Pt was assisted back to bed and positioned for comfort with lights dimmed to promote rest. Will re-attempt in PM.     Re-attempted 1530: Pt sleeping soundly in chair upon arrival, allowed to rest given earlier complaints. Will re-attempt as schedule allows.      Anabell Magallanes, PT, DPT

## 2022-04-29 NOTE — PROGRESS NOTES
Occupational Therapy  Attempted OT tx session: Presented seated in chair and sleeping. Attempted to encourage pt for ADLs/Transfer training however pt declining participation stating \" I did not get any sleep last night. Please let me sleep. \" Pt was repositioned in chair for comfort.  Will re-attempt as schedule allows.       Ceci Graves, OTR/L

## 2022-04-29 NOTE — PROGRESS NOTES
Hospitalist Progress Note    Date of Admission: 4/24/2022    Chief Complaint: SOB    Hospital Course:   75 y.o. male who presented to [facility] with above complaints. Pt with PMH of CAD s/p CABG, diastolic CHF, CKD, HTN, DM 2, HLD, sleep apnea, pulmonary hypertension presented to the ED with complaints of increasing SOB. Has had to go up on his oxygen requirements at home up to 6 L, traditionally uses 3 to 4 L/min. Also reports increased weight gain and worsening leg swelling. Has had severe orthopnea sleeping upright in a recliner every night. Does have some chest heaviness. Feels like his abdomen is swollen. Has early satiety. Pt admits that since being discharged home he has been eating and drinking a lot more when he should not have been doing that. Pt reports increased dyspnea even with minimal activity. Denies any palpitations, nausea vomiting or abdominal pain. Reports he has been compliant with his diuretics at home. He was recently admitted to [facility] for CHF exacerbation earlier this month. 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 On discharge on 4/9/2022 he weighed 287 pounds, on re-admission weighed 316 pounds. Subjective:   Pt is on 6 LPM. Afebrile. VSS. Weight is 317 lbs today. On lasix gtt. Labs:   No results for input(s): WBC, HGB, HCT, PLT in the last 72 hours. Recent Labs     04/27/22  0831 04/28/22  0610 04/29/22  0705    138 138   K 4.1 4.3 4.2   CL 95* 96* 95*   CO2 31 33* 35*   BUN 77* 84* 86*   CREATININE 1.6* 1.6* 1.8*   CALCIUM 9.8 9.9 10.5     Physical Exam Performed:    BP (!) 127/93   Pulse 69   Temp 98.1 °F (36.7 °C)   Resp 16   Ht 5' 9\" (1.753 m)   Wt (!) 317 lb 12.8 oz (144.2 kg)   SpO2 97%   BMI 46.93 kg/m²      General appearance: Very obese, no apparent distress, appears stated age and cooperative. Morbidly obese. HEENT:  Normal cephalic, atraumatic without obvious deformity. Pupils equal, round, and reactive to light.   Extra ocular muscles intact. Conjunctivae/corneas clear. Neck: Supple, with full range of motion. JVP elevated. Trachea midline. Respiratory:  Normal respiratory effort. Clear to auscultation, bilaterally without Rales/Wheezes/Rhonchi. Cardiovascular:  Regular rate and rhythm with normal S1/S2 without murmurs, rubs or gallops. Abdomen: Soft, non-tender, distended with fluid thrill +, with normal bowel sounds. Musculoskeletal:  No clubbing, cyanosis  bilaterally. Full range of motion without deformity. BLE edema improving  Skin: Skin color, texture, turgor normal.  No rashes or lesions. Neurologic:  Neurovascularly intact without any focal sensory/motor deficits. Cranial nerves: II-XII intact, grossly non-focal.  Psychiatric:  Alert and oriented, thought content appropriate, normal insight  Capillary Refill: Brisk,3 seconds, normal  Peripheral Pulses: +2 palpable, equal bilaterally       Assessment/Plan:    Active Hospital Problems    Diagnosis     Elevated troponin [R77.8]      Priority: Medium    Acute diastolic HF (heart failure) ([facility]) [I50.31]      Priority: Medium    PAF (paroxysmal atrial fibrillation) ([facility]) [I48.0]     CKD (chronic kidney disease) [N18.9]     Severe obstructive sleep apnea [G47.33]     Obesity, Class III, BMI 40-49.9 (morbid obesity) ([facility] [E66.01]     HLD (hyperlipidemia) [E78.5]     Morbid obesity ([facility] [E66.01]     S/P CABG x 3 [Z95.1]     Essential hypertension [I10]     Anemia of chronic disease [D63.8]     DM (diabetes mellitus) ([facility] [E11.9]     Coronary artery disease involving native coronary artery of native heart without angina pectoris [I25.10]        Acute on chronic diastolic CHF  -30 pound weight gain in the last 2 weeks, BNP elevated 4308, CXR showing CHF changes.  -Continue Lasix gtt per Nephrology/Cardiology. S/p metolazone.   -Strict I's/O and daily weights.   -1500 cc fluid restricted, low-sodium diet.  -Last ECHO in August 2021 showed LVEF 55%, elevated left ventricular diastolic pressures, moderate AS.  -  Palliative care consulted. Code status changed to DNR. CAD s/p CABG x3  -Troponin 0.05. Likely due to CHF. Flat trend not consistent with ACS. -Last cath in 2012 showed widely patent grafts. -Continue aspirin, statin, Coreg. Cards is holding imdur. MARGARITO on CKD  -Due to CHF, cardiorenal syndrome.  -Consulted nephrology to assist with management, IV Lasix drip ordered.  -Daily renal panel. Acute on chronic hypoxemic respiratory failure  -On 6 L oxygen, usually on 3 to 4 L at home.  -Wean as tolerated to maintain sats >92%. Severe HENNA  -Continue home BiPAP. DM type II  -Controlled, A1c 8.5.  -Continue home insulin regimen, added SSI with Accu-Cheks, hypoglycemia protocol.  -Carb-control diet.      Hyperlipidemia  -Continue statin.      Morbid obesity   -BMI 46.81. Complicating assessment and treatment, placing patient at high risk for multiple co-morbidities as well as early death and contributing to the patient's presentation. Hypertension  -Controlled, continue home medication regimen. Paroxysmal A. Fib  -Controlled on beta-blocker. Not on anticoagulation. [id number] score is 5 placing him at about 7% risk of stroke per year. Pt declines anticoagulation at this time. Anemia of chronic disease  - Hb stable, monitor. Patient is a Spiritism. DVT Prophylaxis: Subcu Lovenox  Diet: ADULT DIET; Regular; 4 carb choices (60 gm/meal);  Low Sodium (2 gm); 1500 ml  Code Status: DNR-CCA  PT/OT Eval Status: NA    Dispo - > 2 days    [facility], APRN - CNP

## 2022-04-30 LAB
ANION GAP SERPL CALCULATED.3IONS-SCNC: 10 MMOL/L (ref 3–16)
BUN BLDV-MCNC: 84 MG/DL (ref 7–20)
CALCIUM SERPL-MCNC: 10.5 MG/DL (ref 8.3–10.6)
CHLORIDE BLD-SCNC: 95 MMOL/L (ref 99–110)
CO2: 32 MMOL/L (ref 21–32)
CREAT SERPL-MCNC: 1.7 MG/DL (ref 0.8–1.3)
GFR AFRICAN AMERICAN: 49
GFR NON-AFRICAN AMERICAN: 40
GLUCOSE BLD-MCNC: 220 MG/DL (ref 70–99)
GLUCOSE BLD-MCNC: 241 MG/DL (ref 70–99)
GLUCOSE BLD-MCNC: 242 MG/DL (ref 70–99)
GLUCOSE BLD-MCNC: 253 MG/DL (ref 70–99)
GLUCOSE BLD-MCNC: 296 MG/DL (ref 70–99)
HCT VFR BLD CALC: 33.7 % (ref 40.5–52.5)
HEMOGLOBIN: 10.8 G/DL (ref 13.5–17.5)
MAGNESIUM: 2.5 MG/DL (ref 1.8–2.4)
PERFORMED ON: ABNORMAL
POTASSIUM SERPL-SCNC: 3.8 MMOL/L (ref 3.5–5.1)
SODIUM BLD-SCNC: 137 MMOL/L (ref 136–145)

## 2022-04-30 PROCEDURE — 6370000000 HC RX 637 (ALT 250 FOR IP): Performed by: INTERNAL MEDICINE

## 2022-04-30 PROCEDURE — 85014 HEMATOCRIT: CPT

## 2022-04-30 PROCEDURE — 80048 BASIC METABOLIC PNL TOTAL CA: CPT

## 2022-04-30 PROCEDURE — 2700000000 HC OXYGEN THERAPY PER DAY

## 2022-04-30 PROCEDURE — 2500000003 HC RX 250 WO HCPCS: Performed by: INTERNAL MEDICINE

## 2022-04-30 PROCEDURE — 2580000003 HC RX 258: Performed by: INTERNAL MEDICINE

## 2022-04-30 PROCEDURE — 6370000000 HC RX 637 (ALT 250 FOR IP): Performed by: NURSE PRACTITIONER

## 2022-04-30 PROCEDURE — 1200000000 HC SEMI PRIVATE

## 2022-04-30 PROCEDURE — 83735 ASSAY OF MAGNESIUM: CPT

## 2022-04-30 PROCEDURE — 36415 COLL VENOUS BLD VENIPUNCTURE: CPT

## 2022-04-30 PROCEDURE — 85018 HEMOGLOBIN: CPT

## 2022-04-30 PROCEDURE — 6360000002 HC RX W HCPCS: Performed by: INTERNAL MEDICINE

## 2022-04-30 PROCEDURE — 94761 N-INVAS EAR/PLS OXIMETRY MLT: CPT

## 2022-04-30 RX ADMIN — PREGABALIN 25 MG: 25 CAPSULE ORAL at 21:43

## 2022-04-30 RX ADMIN — OXYCODONE AND ACETAMINOPHEN 1 TABLET: 5; 325 TABLET ORAL at 03:46

## 2022-04-30 RX ADMIN — INSULIN LISPRO 4 UNITS: 100 INJECTION, SOLUTION INTRAVENOUS; SUBCUTANEOUS at 12:48

## 2022-04-30 RX ADMIN — ACETAZOLAMIDE 250 MG: 500 INJECTION, POWDER, LYOPHILIZED, FOR SOLUTION INTRAVENOUS at 18:25

## 2022-04-30 RX ADMIN — DOCUSATE SODIUM 100 MG: 100 CAPSULE, LIQUID FILLED ORAL at 09:27

## 2022-04-30 RX ADMIN — SODIUM CHLORIDE, PRESERVATIVE FREE 10 ML: 5 INJECTION INTRAVENOUS at 09:27

## 2022-04-30 RX ADMIN — SENNOSIDES 8.6 MG: 8.6 TABLET, COATED ORAL at 21:42

## 2022-04-30 RX ADMIN — INSULIN LISPRO 2 UNITS: 100 INJECTION, SOLUTION INTRAVENOUS; SUBCUTANEOUS at 21:43

## 2022-04-30 RX ADMIN — POLYETHYLENE GLYCOL 3350 17 G: 17 POWDER, FOR SOLUTION ORAL at 23:10

## 2022-04-30 RX ADMIN — ALLOPURINOL 300 MG: 300 TABLET ORAL at 09:27

## 2022-04-30 RX ADMIN — INSULIN GLARGINE 35 UNITS: 100 INJECTION, SOLUTION SUBCUTANEOUS at 09:35

## 2022-04-30 RX ADMIN — MICONAZOLE NITRATE: 2 POWDER TOPICAL at 09:33

## 2022-04-30 RX ADMIN — OXYCODONE AND ACETAMINOPHEN 1 TABLET: 5; 325 TABLET ORAL at 16:12

## 2022-04-30 RX ADMIN — INSULIN LISPRO 6 UNITS: 100 INJECTION, SOLUTION INTRAVENOUS; SUBCUTANEOUS at 09:30

## 2022-04-30 RX ADMIN — CARVEDILOL 3.12 MG: 3.12 TABLET, FILM COATED ORAL at 18:18

## 2022-04-30 RX ADMIN — DOCUSATE SODIUM 100 MG: 100 CAPSULE, LIQUID FILLED ORAL at 21:43

## 2022-04-30 RX ADMIN — SENNOSIDES 8.6 MG: 8.6 TABLET, COATED ORAL at 09:27

## 2022-04-30 RX ADMIN — CARVEDILOL 3.12 MG: 3.12 TABLET, FILM COATED ORAL at 09:27

## 2022-04-30 RX ADMIN — ASPIRIN 81 MG 81 MG: 81 TABLET ORAL at 09:27

## 2022-04-30 RX ADMIN — INSULIN LISPRO 4 UNITS: 100 INJECTION, SOLUTION INTRAVENOUS; SUBCUTANEOUS at 18:18

## 2022-04-30 RX ADMIN — SODIUM CHLORIDE, PRESERVATIVE FREE 10 ML: 5 INJECTION INTRAVENOUS at 21:47

## 2022-04-30 RX ADMIN — TAMSULOSIN HYDROCHLORIDE 0.4 MG: 0.4 CAPSULE ORAL at 06:47

## 2022-04-30 RX ADMIN — PANTOPRAZOLE SODIUM 20 MG: 20 TABLET, DELAYED RELEASE ORAL at 06:47

## 2022-04-30 RX ADMIN — PANTOPRAZOLE SODIUM 20 MG: 20 TABLET, DELAYED RELEASE ORAL at 16:29

## 2022-04-30 RX ADMIN — PREGABALIN 25 MG: 25 CAPSULE ORAL at 09:27

## 2022-04-30 RX ADMIN — ATORVASTATIN CALCIUM 80 MG: 80 TABLET, FILM COATED ORAL at 21:43

## 2022-04-30 RX ADMIN — OXYCODONE AND ACETAMINOPHEN 1 TABLET: 5; 325 TABLET ORAL at 21:42

## 2022-04-30 ASSESSMENT — PAIN SCALES - GENERAL
PAINLEVEL_OUTOF10: 8
PAINLEVEL_OUTOF10: 0
PAINLEVEL_OUTOF10: 8
PAINLEVEL_OUTOF10: 8

## 2022-04-30 ASSESSMENT — PAIN DESCRIPTION - DESCRIPTORS
DESCRIPTORS: ACHING;BURNING;CRAMPING
DESCRIPTORS: ACHING;CRAMPING

## 2022-04-30 ASSESSMENT — PAIN - FUNCTIONAL ASSESSMENT: PAIN_FUNCTIONAL_ASSESSMENT: PREVENTS OR INTERFERES SOME ACTIVE ACTIVITIES AND ADLS

## 2022-04-30 ASSESSMENT — PAIN SCALES - WONG BAKER: WONGBAKER_NUMERICALRESPONSE: 0

## 2022-04-30 ASSESSMENT — PAIN DESCRIPTION - ONSET: ONSET: ON-GOING

## 2022-04-30 ASSESSMENT — PAIN DESCRIPTION - PAIN TYPE: TYPE: CHRONIC PAIN

## 2022-04-30 ASSESSMENT — PAIN DESCRIPTION - FREQUENCY: FREQUENCY: CONTINUOUS

## 2022-04-30 ASSESSMENT — PAIN DESCRIPTION - ORIENTATION: ORIENTATION: OTHER (COMMENT)

## 2022-04-30 ASSESSMENT — PAIN DESCRIPTION - LOCATION: LOCATION: GENERALIZED

## 2022-04-30 NOTE — PROGRESS NOTES
Patient's EF (Ejection Fraction) is greater than 40%    Heart Failure Medications:   Diuretics[de-identified] acetazolamide     (One of the following REQUIRED for EF </= 40%/SYSTOLIC FAILURE but MAY be used in EF% >40%/DIASTOLIC FAILURE)        ACE[de-identified] None        ARB[de-identified] None         ARNI[de-identified] None    (Beta Blockers)   NON- Evidenced Based Beta Blocker (for EF% >40%/DIASTOLIC FAILURE): None     Evidenced Based Beta Blocker::(REQUIRED for EF% <40%/SYSTOLIC FAILURE) None  . .................................................................................................................................................. Patient's weights and intake/output reviewed: Yes    Patient's Last Weight: 315 lbs obtained by standing scale. Difference of 2 lbs less than last documented weight. Intake/Output Summary (Last 24 hours) at 4/30/2022 1946  Last data filed at 4/30/2022 1931  Gross per 24 hour   Intake 1680 ml   Output 2275 ml   Net -595 ml       Comorbidities Reviewed Yes    Patient has a past medical history of Anemia, Angina, Arthritis, CAD (coronary artery disease), CHF (congestive heart failure) ([address] CKD (chronic kidney disease) stage 3, GFR 30-59 ml/min ([facility]), Clostridium difficile diarrhea, Diabetes mellitus ([address] Diabetic neuropathy ([address] Disease of blood and blood forming organ, Dizziness, GERD (gastroesophageal reflux disease), Headache, Hearing loss, Hyperlipidemia, Hypertension, Kidney stone, Refusal of blood transfusions as patient is Jew, Sleep apnea, Tinnitus, Venous ulcer ([address] and Wound, open. >>For CHF and Comorbidity documentation on Education Time and Topics, please see Education Tab    Progressive Mobility Assessment:  What is this patient's Current Level of Mobility?: Ambulatory- with Assistance  How was this patient Mobilized today?: Edge of Bed, Up to Chair, Bedside Commode and  Up to Toilet/Shower, ambulated 20 ft                 With Whom?  Nurse, PCA, PT, OT and Self Level of Difficulty/Assistance: 2x Assist     Pt sitting in bed at this time on 4 L O2. Pt denies shortness of breath. Pt with nonpitting lower extremity edema.      Patient and/or Family's stated Goal of Care this Admission: reduce shortness of breath, increase activity tolerance, better understand heart failure and disease management, be more comfortable and reduce lower extremity edema prior to discharge        :

## 2022-04-30 NOTE — PROGRESS NOTES
End of shift report given to [address] [place] [address]. Call light within reach, bed in lowest position, no needs at this time.

## 2022-04-30 NOTE — PROGRESS NOTES
[facility] Progress Note           Subjective/   75y.o. year old male who we are seeing in consultation for MARGARITO/CKD-3. HPI:  Renal function stable with diuresis, urine output of 1.9 liters over last 24 hours with prn diamox. States still short of breath, oxygen at 4-5 L NC.    ROS:  Intake adequate, +weak. Objective/   GEN:  Chronically ill, BP (!) 101/58   Pulse 63   Temp 97.8 °F (36.6 °C) (Oral)   Resp 16   Ht 5' 9\" (1.753 m)   Wt (!) 315 lb 14.4 oz (143.3 kg)   SpO2 93%   BMI 46.65 kg/m²   HEENT: non-icteric, no JVD  CV: S1, S2 without m/r/g; +++ LE edema  RESP: CTA B without w/r/r; breathing wnl  ABD: +bs, soft, nt, no hsm  SKIN: warm, no rashes    Data/  Recent Labs     04/30/22  0652   HGB 10.8*   HCT 33.7*     Recent Labs     04/28/22  0610 04/29/22  0705 04/30/22  0652    138 137   K 4.3 4.2 3.8   CL 96* 95* 95*   CO2 33* 35* 32   GLUCOSE 297* 299* 296*   MG 2.70* 2.40 2.50*   BUN 84* 86* 84*   CREATININE 1.6* 1.8* 1.7*   LABGLOM 43* 38* 40*   GFRAA 52* 46* 49*       Assessment/     - Acute on chronic kidney disease stage 3              Worsening in setting of CHF decompensation, hx of volatile fluctuations in levels              based on volume status & diuretic use              Background DM Nephropathy + Cardiorenal syndrome              Baseline SCr mid 1 range, 1.5-1. 7              Follows with Dr. Sunil Jenkins in office              Improving with diuresis in past                - dCHF exacerbation     - DM2 - poorly controlled     - Normocytic anemia, chronic - Hb at target     - Voodoo    Plan/     - Repeat dose of diamox IV today  - Discussed possibility of ultrafiltration treatments for further fluid removal - patient not excited about the idea as he does not think he could do long-term  - Trend labs, bp's, weights, & urine output     Overall prognosis guarded    ____________________________________  Shade Belt, MD  [facility] [address] [place] [address]. [facility]  Office: [phone]

## 2022-04-30 NOTE — PROGRESS NOTES
Hospitalist Progress Note      PCP: Natalee Goldstein APRN - CNP    Date of Admission: 4/24/2022    Chief Complaint:   Edema    Hospital Course:      75 y.o. male who presented to [facility] with above complaints. Pt with PMH of CAD s/p CABG, diastolic CHF, CKD, HTN, DM 2, HLD, sleep apnea, pulmonary hypertension presented to the ED with complaints of increasing SOB. Has had to go up on his oxygen requirements at home up to 6 L, traditionally uses 3 to 4 L/min. Also reports increased weight gain and worsening leg swelling. Has had severe orthopnea sleeping upright in a recliner every night. Does have some chest heaviness. Feels like his abdomen is swollen. Has early satiety. Pt admits that since being discharged home he has been eating and drinking a lot more when he should not have been doing that. Pt reports increased dyspnea even with minimal activity. Denies any palpitations, nausea vomiting or abdominal pain. Reports he has been compliant with his diuretics at home. He was recently admitted to [facility] for CHF exacerbation earlier this month. He is a Hindu and will not accept blood products. On discharge on 4/9/2022 he weighed 287 pounds, on re-admission weighed 316 pounds. Subjective:   Patient still with edema. He thinks he had more output last PM. Off lasix drip. No nausea or vomiting. No fever or chills.  On 4LPM    Medications:  Reviewed    Infusion Medications    dextrose      sodium chloride       Scheduled Medications    heparin (porcine)  5,000 Units SubCUTAneous 3 times per day    allopurinol  300 mg Oral Daily    aspirin  81 mg Oral Daily    atorvastatin  80 mg Oral Nightly    carvedilol  3.125 mg Oral BID WC    docusate sodium  100 mg Oral BID    [Held by provider] ferrous sulfate  325 mg Oral BID    insulin glargine  35 Units SubCUTAneous Daily    insulin lispro  0-12 Units SubCUTAneous TID WC    insulin lispro  0-6 Units SubCUTAneous Nightly    pantoprazole  20 mg Oral BID AC    pregabalin  25 mg Oral BID    senna  1 tablet Oral BID    tamsulosin  0.4 mg Oral Daily    sodium chloride flush  5-40 mL IntraVENous 2 times per day    miconazole   Topical BID     PRN Meds: famotidine, glucose, glucagon (rDNA), dextrose, nitroGLYCERIN, sodium chloride flush, sodium chloride, ondansetron **OR** ondansetron, polyethylene glycol, acetaminophen **OR** acetaminophen, dextrose bolus (hypoglycemia) **OR** dextrose bolus (hypoglycemia), oxyCODONE-acetaminophen      Intake/Output Summary (Last 24 hours) at 4/30/2022 1154  Last data filed at 4/30/2022 1142  Gross per 24 hour   Intake 1080 ml   Output 2725 ml   Net -1645 ml       Physical Exam Performed:    /62   Pulse 73   Temp 97.5 °F (36.4 °C) (Oral)   Resp 18   Ht 5' 9\" (1.753 m)   Wt (!) 315 lb 14.4 oz (143.3 kg)   SpO2 92%   BMI 46.65 kg/m²     General appearance: No apparent distress, appears stated age and cooperative. Laying in bed. HEENT: Pupils equal, round, and reactive to light. Conjunctivae/corneas clear. Neck: Supple, with full range of motion. No jugular venous distention. Trachea midline. Respiratory:  Normal respiratory effort. Clear to auscultation, bilaterally without Rales/Wheezes/Rhonchi. Cardiovascular: Regular rate and rhythm with normal S1/S2 without murmurs, rubs or gallops. Abdomen: Abd distended/obese. Musculoskeletal: No clubbing, cyanosis. 4+ edema in BLE. Full range of motion without deformity. Skin: Skin color, texture, turgor normal.  Erythema bilateral lower extremities. Neurologic:  Neurovascularly intact without any focal sensory/motor deficits.  Cranial nerves: II-XII intact, grossly non-focal.  Psychiatric: Alert and oriented, thought content appropriate, normal insight  Capillary Refill: Brisk,3 seconds, normal   Peripheral Pulses: +2 palpable, equal bilaterally       Labs:   Recent Labs     04/30/22  0652   HGB 10.8*   HCT 33.7*     Recent Labs     04/28/22  0610 04/29/22  0705 04/30/22  0652    138 137   K 4.3 4.2 3.8   CL 96* 95* 95*   CO2 33* 35* 32   BUN 84* 86* 84*   CREATININE 1.6* 1.8* 1.7*   CALCIUM 9.9 10.5 10.5     No results for input(s): AST, ALT, BILIDIR, BILITOT, ALKPHOS in the last 72 hours. No results for input(s): INR in the last 72 hours. No results for input(s): Reyne Norwegian in the last 72 hours. Urinalysis:      Lab Results   Component Value Date    NITRU Negative 03/31/2022    WBCUA 0-2 11/05/2013    BACTERIA Rare 11/05/2013    RBCUA 0-2 11/05/2013    BLOODU Negative 03/31/2022    SPECGRAV 1.010 03/31/2022    GLUCOSEU Negative 03/31/2022    GLUCOSEU NEGATIVE 02/29/2012       Radiology:  XR CHEST PORTABLE   Final Result   Prior sternotomy. Marked cardiomegaly pulmonary vascular congestion and   trace effusions, most compatible with CHF. Due to the degree of pulmonary   edema, underlying pneumonia could be hidden. Slightly greater pulmonary   edema was noted when compared to 03/31/2022. Assessment/Plan:    Active Hospital Problems    Diagnosis     Elevated troponin [R77.8]      Priority: Medium    Acute diastolic HF (heart failure) ([facility]) [I50.31]      Priority: Medium    PAF (paroxysmal atrial fibrillation) ([facility]) [I48.0]     CKD (chronic kidney disease) [N18.9]     Severe obstructive sleep apnea [G47.33]     Obesity, Class III, BMI 40-49.9 (morbid obesity) ([facility] [E66.01]     HLD (hyperlipidemia) [E78.5]     Morbid obesity (HCC) [E66.01]     S/P CABG x 3 [Z95.1]     Essential hypertension [I10]     Anemia of chronic disease [D63.8]     DM (diabetes mellitus) ([facility] [E11.9]     Coronary artery disease involving native coronary artery of native heart without angina pectoris [I25.10]      1. Acute on chronic diastolic CHF - Continue diuresis per nephrology. On diovan drip. 2. CAD s/p CABG - Continue ASA and statin. 3. MARGARITO on CKD - Creatinine stable ~ 1.7.  4. DMII - Continue to monitor. Continue SS Insulin. 5. Hyperlipidemia - Continue Statin. DVT Prophylaxis: Lovenox  Diet: ADULT DIET; Regular; 4 carb choices (60 gm/meal); Low Sodium (2 gm); 1500 ml  Code Status: DNR-CCA    PT/OT Eval Status: Following    Dispo - SNF vs home when stable.      Sarah Woodruff MD

## 2022-05-01 LAB
ACETAMINOPHEN LEVEL: <5 UG/ML (ref 10–30)
ALBUMIN SERPL-MCNC: 3.7 G/DL (ref 3.4–5)
AMMONIA: 22 UMOL/L (ref 16–60)
AMPHETAMINE SCREEN, URINE: ABNORMAL
ANION GAP SERPL CALCULATED.3IONS-SCNC: 13 MMOL/L (ref 3–16)
BARBITURATE SCREEN URINE: ABNORMAL
BENZODIAZEPINE SCREEN, URINE: ABNORMAL
BILIRUBIN URINE: NEGATIVE
BLOOD, URINE: NEGATIVE
BUN BLDV-MCNC: 78 MG/DL (ref 7–20)
CALCIUM SERPL-MCNC: 10.3 MG/DL (ref 8.3–10.6)
CANNABINOID SCREEN URINE: ABNORMAL
CHLORIDE BLD-SCNC: 95 MMOL/L (ref 99–110)
CLARITY: CLEAR
CO2: 30 MMOL/L (ref 21–32)
COCAINE METABOLITE SCREEN URINE: ABNORMAL
COLOR: YELLOW
CREAT SERPL-MCNC: 1.6 MG/DL (ref 0.8–1.3)
GFR AFRICAN AMERICAN: 52
GFR NON-AFRICAN AMERICAN: 43
GLUCOSE BLD-MCNC: 211 MG/DL (ref 70–99)
GLUCOSE BLD-MCNC: 221 MG/DL (ref 70–99)
GLUCOSE BLD-MCNC: 227 MG/DL (ref 70–99)
GLUCOSE BLD-MCNC: 249 MG/DL (ref 70–99)
GLUCOSE BLD-MCNC: 314 MG/DL (ref 70–99)
GLUCOSE URINE: NEGATIVE MG/DL
KETONES, URINE: NEGATIVE MG/DL
LEUKOCYTE ESTERASE, URINE: NEGATIVE
Lab: ABNORMAL
METHADONE SCREEN, URINE: ABNORMAL
MICROSCOPIC EXAMINATION: NORMAL
NITRITE, URINE: NEGATIVE
OPIATE SCREEN URINE: ABNORMAL
OXYCODONE URINE: POSITIVE
PERFORMED ON: ABNORMAL
PH UA: 6.5
PH UA: 6.5 (ref 5–8)
PHENCYCLIDINE SCREEN URINE: ABNORMAL
PHOSPHORUS: 3.6 MG/DL (ref 2.5–4.9)
POTASSIUM SERPL-SCNC: 3.8 MMOL/L (ref 3.5–5.1)
PRO-BNP: 4695 PG/ML (ref 0–124)
PROPOXYPHENE SCREEN: ABNORMAL
PROTEIN UA: NEGATIVE MG/DL
SALICYLATE, SERUM: <0.3 MG/DL (ref 15–30)
SODIUM BLD-SCNC: 138 MMOL/L (ref 136–145)
SPECIFIC GRAVITY UA: 1.01 (ref 1–1.03)
URINE TYPE: NORMAL
UROBILINOGEN, URINE: 0.2 E.U./DL

## 2022-05-01 PROCEDURE — 80143 DRUG ASSAY ACETAMINOPHEN: CPT

## 2022-05-01 PROCEDURE — 82140 ASSAY OF AMMONIA: CPT

## 2022-05-01 PROCEDURE — 6370000000 HC RX 637 (ALT 250 FOR IP): Performed by: INTERNAL MEDICINE

## 2022-05-01 PROCEDURE — 83880 ASSAY OF NATRIURETIC PEPTIDE: CPT

## 2022-05-01 PROCEDURE — 1200000000 HC SEMI PRIVATE

## 2022-05-01 PROCEDURE — 2700000000 HC OXYGEN THERAPY PER DAY

## 2022-05-01 PROCEDURE — 6370000000 HC RX 637 (ALT 250 FOR IP): Performed by: NURSE PRACTITIONER

## 2022-05-01 PROCEDURE — 80069 RENAL FUNCTION PANEL: CPT

## 2022-05-01 PROCEDURE — 81003 URINALYSIS AUTO W/O SCOPE: CPT

## 2022-05-01 PROCEDURE — 94761 N-INVAS EAR/PLS OXIMETRY MLT: CPT

## 2022-05-01 PROCEDURE — 80179 DRUG ASSAY SALICYLATE: CPT

## 2022-05-01 PROCEDURE — 2580000003 HC RX 258: Performed by: INTERNAL MEDICINE

## 2022-05-01 PROCEDURE — 36415 COLL VENOUS BLD VENIPUNCTURE: CPT

## 2022-05-01 PROCEDURE — 80307 DRUG TEST PRSMV CHEM ANLYZR: CPT

## 2022-05-01 RX ORDER — TORSEMIDE 100 MG/1
100 TABLET ORAL DAILY
Status: DISCONTINUED | OUTPATIENT
Start: 2022-05-01 | End: 2022-05-03 | Stop reason: HOSPADM

## 2022-05-01 RX ORDER — LANOLIN ALCOHOL/MO/W.PET/CERES
3 CREAM (GRAM) TOPICAL NIGHTLY
Status: DISCONTINUED | OUTPATIENT
Start: 2022-05-01 | End: 2022-05-03 | Stop reason: HOSPADM

## 2022-05-01 RX ORDER — BRIMONIDINE TARTRATE, TIMOLOL MALEATE 2; 5 MG/ML; MG/ML
1 SOLUTION/ DROPS OPHTHALMIC NIGHTLY
Status: DISCONTINUED | OUTPATIENT
Start: 2022-05-01 | End: 2022-05-03 | Stop reason: HOSPADM

## 2022-05-01 RX ORDER — METOLAZONE 2.5 MG/1
5 TABLET ORAL ONCE
Status: COMPLETED | OUTPATIENT
Start: 2022-05-01 | End: 2022-05-01

## 2022-05-01 RX ADMIN — SODIUM CHLORIDE, PRESERVATIVE FREE 10 ML: 5 INJECTION INTRAVENOUS at 08:38

## 2022-05-01 RX ADMIN — Medication 3 MG: at 21:15

## 2022-05-01 RX ADMIN — PREGABALIN 25 MG: 25 CAPSULE ORAL at 21:15

## 2022-05-01 RX ADMIN — INSULIN GLARGINE 35 UNITS: 100 INJECTION, SOLUTION SUBCUTANEOUS at 08:39

## 2022-05-01 RX ADMIN — OXYCODONE AND ACETAMINOPHEN 1 TABLET: 5; 325 TABLET ORAL at 21:14

## 2022-05-01 RX ADMIN — ALLOPURINOL 300 MG: 300 TABLET ORAL at 08:38

## 2022-05-01 RX ADMIN — SENNOSIDES 8.6 MG: 8.6 TABLET, COATED ORAL at 21:14

## 2022-05-01 RX ADMIN — MICONAZOLE NITRATE: 2 POWDER TOPICAL at 12:18

## 2022-05-01 RX ADMIN — POLYETHYLENE GLYCOL 3350 17 G: 17 POWDER, FOR SOLUTION ORAL at 21:22

## 2022-05-01 RX ADMIN — DOCUSATE SODIUM 100 MG: 100 CAPSULE, LIQUID FILLED ORAL at 08:38

## 2022-05-01 RX ADMIN — DOCUSATE SODIUM 100 MG: 100 CAPSULE, LIQUID FILLED ORAL at 21:15

## 2022-05-01 RX ADMIN — INSULIN LISPRO 4 UNITS: 100 INJECTION, SOLUTION INTRAVENOUS; SUBCUTANEOUS at 08:39

## 2022-05-01 RX ADMIN — INSULIN LISPRO 4 UNITS: 100 INJECTION, SOLUTION INTRAVENOUS; SUBCUTANEOUS at 21:15

## 2022-05-01 RX ADMIN — TORSEMIDE 100 MG: 100 TABLET ORAL at 13:23

## 2022-05-01 RX ADMIN — INSULIN LISPRO 4 UNITS: 100 INJECTION, SOLUTION INTRAVENOUS; SUBCUTANEOUS at 12:18

## 2022-05-01 RX ADMIN — BRIMONIDINE TARTRATE, TIMOLOL MALEATE 1 DROP: 2; 5 SOLUTION/ DROPS OPHTHALMIC at 21:17

## 2022-05-01 RX ADMIN — METOLAZONE 5 MG: 2.5 TABLET ORAL at 13:23

## 2022-05-01 RX ADMIN — Medication 3 MG: at 00:37

## 2022-05-01 RX ADMIN — PREGABALIN 25 MG: 25 CAPSULE ORAL at 08:38

## 2022-05-01 RX ADMIN — PANTOPRAZOLE SODIUM 20 MG: 20 TABLET, DELAYED RELEASE ORAL at 06:09

## 2022-05-01 RX ADMIN — OXYCODONE AND ACETAMINOPHEN 1 TABLET: 5; 325 TABLET ORAL at 06:09

## 2022-05-01 RX ADMIN — INSULIN LISPRO 4 UNITS: 100 INJECTION, SOLUTION INTRAVENOUS; SUBCUTANEOUS at 18:01

## 2022-05-01 RX ADMIN — CARVEDILOL 3.12 MG: 3.12 TABLET, FILM COATED ORAL at 08:38

## 2022-05-01 RX ADMIN — ATORVASTATIN CALCIUM 80 MG: 80 TABLET, FILM COATED ORAL at 21:14

## 2022-05-01 RX ADMIN — SODIUM CHLORIDE, PRESERVATIVE FREE 10 ML: 5 INJECTION INTRAVENOUS at 21:23

## 2022-05-01 RX ADMIN — SENNOSIDES 8.6 MG: 8.6 TABLET, COATED ORAL at 08:38

## 2022-05-01 RX ADMIN — TAMSULOSIN HYDROCHLORIDE 0.4 MG: 0.4 CAPSULE ORAL at 06:09

## 2022-05-01 RX ADMIN — ASPIRIN 81 MG 81 MG: 81 TABLET ORAL at 08:38

## 2022-05-01 ASSESSMENT — PAIN DESCRIPTION - ORIENTATION
ORIENTATION: RIGHT;LEFT;LOWER
ORIENTATION: LOWER

## 2022-05-01 ASSESSMENT — PAIN DESCRIPTION - PAIN TYPE: TYPE: CHRONIC PAIN

## 2022-05-01 ASSESSMENT — PAIN SCALES - GENERAL
PAINLEVEL_OUTOF10: 0
PAINLEVEL_OUTOF10: 8
PAINLEVEL_OUTOF10: 8
PAINLEVEL_OUTOF10: 6
PAINLEVEL_OUTOF10: 0
PAINLEVEL_OUTOF10: 0

## 2022-05-01 ASSESSMENT — PAIN DESCRIPTION - FREQUENCY: FREQUENCY: CONTINUOUS

## 2022-05-01 ASSESSMENT — PAIN DESCRIPTION - ONSET: ONSET: ON-GOING

## 2022-05-01 ASSESSMENT — PAIN DESCRIPTION - LOCATION
LOCATION: FOOT;LEG
LOCATION: LEG

## 2022-05-01 ASSESSMENT — PAIN DESCRIPTION - DESCRIPTORS
DESCRIPTORS: ACHING;CRAMPING
DESCRIPTORS: ACHING;CRAMPING

## 2022-05-01 ASSESSMENT — PAIN SCALES - WONG BAKER
WONGBAKER_NUMERICALRESPONSE: 0
WONGBAKER_NUMERICALRESPONSE: 0

## 2022-05-01 ASSESSMENT — PAIN - FUNCTIONAL ASSESSMENT: PAIN_FUNCTIONAL_ASSESSMENT: PREVENTS OR INTERFERES SOME ACTIVE ACTIVITIES AND ADLS

## 2022-05-01 NOTE — PLAN OF CARE
Problem: Pain  Goal: Verbalizes/displays adequate comfort level or baseline comfort level  5/1/2022 1353 by Roc Griffiths RN  Outcome: Progressing  Note: Pt will be satisfied with pain control. Pt uses numeric pain rating scale with reassessments after pain med administration. Will continue to monitor progression throughout shift. Problem: Safety - Adult  Goal: Free from fall injury  5/1/2022 1353 by Roc Griffiths RN  Outcome: Progressing  Note: Pt will remain free from falls throughout hospital stay. Fall precautions in place, bed in lowest position with wheels locked and side rails 2/4 up. Room door open and hourly rounding completed. Will continue to monitor throughout shift. Pt refusing bed alarm. Problem: Chronic Conditions and Co-morbidities  Goal: Patient's chronic conditions and co-morbidity symptoms are monitored and maintained or improved  Outcome: Progressing  Note: Pt will have accuchecks before meals and at bedtime with sliding scale insulin in place for coverage. Will continue to monitor for signs and symptoms of hypoglycemia and hyperglycemia throughout shift. Problem: Skin/Tissue Integrity  Goal: Absence of new skin breakdown  Description: 1. Monitor for areas of redness and/or skin breakdown  2. Assess vascular access sites hourly  3. Every 4-6 hours minimum:  Change oxygen saturation probe site  4. Every 4-6 hours:  If on nasal continuous positive airway pressure, respiratory therapy assess nares and determine need for appliance change or resting period. Outcome: Progressing  Note: Pt is at risk for skin breakdown. Pt will have skin assessments every shift, Q2 turns, heels elevated off of the bed, and friction and shear prevented when possible. Will continue to monitor for signs of skin breakdown and enforce prevention measures.

## 2022-05-01 NOTE — PROGRESS NOTES
[facility] Progress Note           Subjective/   75y.o. year old male who we are seeing in consultation for MARGARITO/CKD-3. HPI:  Renal function stable with diuresis, urine output of 1.9 liters over last 24 hours with prn diamox. States still short of breath, oxygen at 4-5 L NC.    ROS:  Intake adequate, +weak. Objective/   GEN:  Chronically ill, BP (!) 115/56   Pulse 65   Temp 97.7 °F (36.5 °C) (Oral)   Resp 18   Ht 5' 9\" (1.753 m)   Wt (!) 317 lb 8 oz (144 kg)   SpO2 90%   BMI 46.89 kg/m²   HEENT: non-icteric, no JVD  CV: S1, S2 without m/r/g; +++ LE edema  RESP: CTA B without w/r/r; breathing wnl  ABD: +bs, soft, nt, no hsm  SKIN: warm, no rashes    Data/  Recent Labs     04/30/22  0652   HGB 10.8*   HCT 33.7*     Recent Labs     04/28/22  0610 04/29/22  0705 04/30/22  0652    138 137   K 4.3 4.2 3.8   CL 96* 95* 95*   CO2 33* 35* 32   GLUCOSE 297* 299* 296*   MG 2.70* 2.40 2.50*   BUN 84* 86* 84*   CREATININE 1.6* 1.8* 1.7*   LABGLOM 43* 38* 40*   GFRAA 52* 46* 49*       Assessment/     - Acute on chronic kidney disease stage 3              Worsening in setting of CHF decompensation, hx of volatile fluctuations in levels              based on volume status & diuretic use              Background DM Nephropathy + Cardiorenal syndrome              Baseline SCr mid 1 range, 1.5-1. 7              Follows with Dr. Chloe Ortiz in office              Improving with diuresis in past                - dCHF exacerbation     - DM2 - poorly controlled     - Normocytic anemia, chronic - Hb at target     - Christian    Plan/     - Retart home dose of torsemide 100 mg a day with addition of prn metolazone  - Discussed possibility of ultrafiltration treatments for further fluid removal - patient not excited about the idea as he does not think he could do long-term  - Trend labs, bp's, weights, & urine output     Overall prognosis poor  Not much more to offer here, have discussed his situation with him that he will continue  to have recurrent hospitalizations for fluid related issues    ____________________________________  Colton Brown MD  [facility]  [email or web address]  Office: [phone]

## 2022-05-01 NOTE — PROGRESS NOTES
RN witnessed pt taking his own supply of Excedrin. PT then allowed RN to go through pt's bag at bedside. The home meds were collected and placed in lock box. Pt mad aware that is unsafe to take medications from home while in the hospital. Will continue to monitor. Pt made aware he can not be given his prn pain medications until a hr from now, due to just taking pain meds.

## 2022-05-01 NOTE — PROGRESS NOTES
Hospitalist Progress Note      PCP: Yordy Odom, APRN - CNP    Date of Admission: 4/24/2022    Chief Complaint:   Edema    Hospital Course:      75 y.o. male who presented to [facility] with above complaints. Pt with PMH of CAD s/p CABG, diastolic CHF, CKD, HTN, DM 2, HLD, sleep apnea, pulmonary hypertension presented to the ED with complaints of increasing SOB. Has had to go up on his oxygen requirements at home up to 6 L, traditionally uses 3 to 4 L/min. Also reports increased weight gain and worsening leg swelling. Has had severe orthopnea sleeping upright in a recliner every night. Does have some chest heaviness. Feels like his abdomen is swollen. Has early satiety. Pt admits that since being discharged home he has been eating and drinking a lot more when he should not have been doing that. Pt reports increased dyspnea even with minimal activity. Denies any palpitations, nausea vomiting or abdominal pain. Reports he has been compliant with his diuretics at home. He was recently admitted to [facility] for CHF exacerbation earlier this month. He is a Bahai and will not accept blood products. On discharge on 4/9/2022 he weighed 287 pounds, on re-admission weighed 316 pounds. Subjective:   Patient still with edema. No fever or chills. No nausea.      Medications:  Reviewed    Infusion Medications    dextrose      sodium chloride       Scheduled Medications    melatonin  3 mg Oral Nightly    NONFORMULARY 1 drop  1 drop Both Eyes Nightly    NONFORMULARY 1 drop  1 drop Both Eyes Nightly    heparin (porcine)  5,000 Units SubCUTAneous 3 times per day    allopurinol  300 mg Oral Daily    aspirin  81 mg Oral Daily    atorvastatin  80 mg Oral Nightly    carvedilol  3.125 mg Oral BID     docusate sodium  100 mg Oral BID    [Held by provider] ferrous sulfate  325 mg Oral BID    insulin glargine  35 Units SubCUTAneous Daily    insulin lispro  0-12 Units SubCUTAneous TID     insulin lispro  0-6 Units SubCUTAneous Nightly    pantoprazole  20 mg Oral BID AC    pregabalin  25 mg Oral BID    senna  1 tablet Oral BID    tamsulosin  0.4 mg Oral Daily    sodium chloride flush  5-40 mL IntraVENous 2 times per day    miconazole   Topical BID     PRN Meds: famotidine, glucose, glucagon (rDNA), dextrose, nitroGLYCERIN, sodium chloride flush, sodium chloride, ondansetron **OR** ondansetron, polyethylene glycol, acetaminophen **OR** acetaminophen, dextrose bolus (hypoglycemia) **OR** dextrose bolus (hypoglycemia), oxyCODONE-acetaminophen      Intake/Output Summary (Last 24 hours) at 5/1/2022 1132  Last data filed at 5/1/2022 0944  Gross per 24 hour   Intake 1440 ml   Output 1775 ml   Net -335 ml       Physical Exam Performed:    BP (!) 120/56   Pulse 63   Temp 97.7 °F (36.5 °C) (Oral)   Resp 20   Ht 5' 9\" (1.753 m)   Wt (!) 317 lb 8 oz (144 kg)   SpO2 94%   BMI 46.89 kg/m²     General appearance: No apparent distress, appears stated age and cooperative. Laying in bed. HEENT: Pupils equal, round, and reactive to light. Conjunctivae/corneas clear. Neck: Supple, with full range of motion. No jugular venous distention. Trachea midline. Respiratory:  Normal respiratory effort. Clear to auscultation, bilaterally without Rales/Wheezes/Rhonchi. Cardiovascular: Regular rate and rhythm with normal S1/S2 without murmurs, rubs or gallops. Abdomen: Abd distended/obese. Musculoskeletal: No clubbing, cyanosis. 3+ edema in BLE. Full range of motion without deformity. Skin: Skin color, texture, turgor normal.  Erythema bilateral lower extremities. Neurologic:  Neurovascularly intact without any focal sensory/motor deficits.  Cranial nerves: II-XII intact, grossly non-focal.  Psychiatric: Alert and oriented, thought content appropriate, normal insight  Capillary Refill: Brisk,3 seconds, normal   Peripheral Pulses: +2 palpable, equal bilaterally       Labs:   Recent Labs     04/30/22  0652   HGB 10.8* HCT 33.7*     Recent Labs     04/29/22  0705 04/30/22  0652    137   K 4.2 3.8   CL 95* 95*   CO2 35* 32   BUN 86* 84*   CREATININE 1.8* 1.7*   CALCIUM 10.5 10.5     No results for input(s): AST, ALT, BILIDIR, BILITOT, ALKPHOS in the last 72 hours. No results for input(s): INR in the last 72 hours. No results for input(s): Herrera Torrance in the last 72 hours. Results for Geeta Salvador \"DON\" (MRN [id number]) as of 5/1/2022 11:45   Ref. Range 5/1/2022 08:07   Pro-BNP Latest Ref Range: 0 - 124 pg/mL 4,695 (H)     Urinalysis:      Lab Results   Component Value Date    NITRU Negative 03/31/2022    WBCUA 0-2 11/05/2013    BACTERIA Rare 11/05/2013    RBCUA 0-2 11/05/2013    BLOODU Negative 03/31/2022    SPECGRAV 1.010 03/31/2022    GLUCOSEU Negative 03/31/2022    GLUCOSEU NEGATIVE 02/29/2012       Radiology:  XR CHEST PORTABLE   Final Result   Prior sternotomy. Marked cardiomegaly pulmonary vascular congestion and   trace effusions, most compatible with CHF. Due to the degree of pulmonary   edema, underlying pneumonia could be hidden. Slightly greater pulmonary   edema was noted when compared to 03/31/2022. Assessment/Plan:    Active Hospital Problems    Diagnosis     Elevated troponin [R77.8]      Priority: Medium    Acute diastolic HF (heart failure) ([facility]) [I50.31]      Priority: Medium    PAF (paroxysmal atrial fibrillation) ([facility]) [I48.0]     CKD (chronic kidney disease) [N18.9]     Severe obstructive sleep apnea [G47.33]     Obesity, Class III, BMI 40-49.9 (morbid obesity) ([facility] [E66.01]     HLD (hyperlipidemia) [E78.5]     Morbid obesity (HCC) [E66.01]     S/P CABG x 3 [Z95.1]     Essential hypertension [I10]     Anemia of chronic disease [D63.8]     DM (diabetes mellitus) ([facility] [E11.9]     Coronary artery disease involving native coronary artery of native heart without angina pectoris [I25.10]      1. Acute on chronic diastolic CHF - Continue diuresis per nephrology. Received diamox. Monitor strict I/O's. Net 3.3 L out. 2. CAD s/p CABG - Continue ASA and statin. 3. MARGARITO on CKD - Creatinine stable ~ 1.7. Monitor. 4. DMII - Continue to monitor. Continue SS Insulin. 5. Hyperlipidemia - Continue Statin. DVT Prophylaxis: Lovenox  Diet: ADULT DIET; Regular; 4 carb choices (60 gm/meal); Low Sodium (2 gm); 1500 ml  Code Status: DNR-CCA    PT/OT Eval Status: Following    Dispo - SNF vs home when stable.      Joseph Lloyd MD

## 2022-05-01 NOTE — PROGRESS NOTES
Paged cross-cover at this time: \"Hey, I just caught pt taking home excedrine in his room. I went through and collected all his meds but he said he needs his eyedrops verified by pharmacy bc he takes them every night. Can we get the following ordered one gtt each eye every night; Combigan, Dorzolamide, Rhopressa. Thank you. \"

## 2022-05-01 NOTE — PLAN OF CARE
Problem: Pain  Goal: Verbalizes/displays adequate comfort level or baseline comfort level  Outcome: Progressing  Note: Pt will be satisfied with pain control. Pt uses numeric pain rating scale with reassessments after pain med administration. Will continue to monitor progression throughout shift. Problem: Safety - Adult  Goal: Free from fall injury  Outcome: Progressing  Note: Pt high fall risk. Instructed to use call light before getting out of bed. Call light within reach. Bed in low position. Bed alarm on. Will continue to monitor.

## 2022-05-01 NOTE — PLAN OF CARE
Patient's EF (Ejection Fraction) is greater than 40%    Heart Failure Medications:   Diuretics[de-identified] Other: Acetazolamide      (One of the following REQUIRED for EF </= 40%/SYSTOLIC FAILURE but MAY be used in EF% >40%/DIASTOLIC FAILURE)        ACE[de-identified] None        ARB[de-identified] None         ARNI[de-identified] None    (Beta Blockers)   NON- Evidenced Based Beta Blocker (for EF% >40%/DIASTOLIC FAILURE): None     Evidenced Based Beta Blocker::(REQUIRED for EF% <40%/SYSTOLIC FAILURE) None  . .................................................................................................................................................. Patient's weights and intake/output reviewed: Yes    Patient's Last Weight: 313 lbs obtained by standing scale. Difference of 2 lbs less than last documented weight. Intake/Output Summary (Last 24 hours) at 5/1/2022 0246  Last data filed at 4/30/2022 2325  Gross per 24 hour   Intake 1560 ml   Output 1875 ml   Net -315 ml       Comorbidities Reviewed Yes    Patient has a past medical history of Anemia, Angina, Arthritis, CAD (coronary artery disease), CHF (congestive heart failure) ([address] CKD (chronic kidney disease) stage 3, GFR 30-59 ml/min ([facility]), Clostridium difficile diarrhea, Diabetes mellitus ([address] Diabetic neuropathy ([address] Disease of blood and blood forming organ, Dizziness, GERD (gastroesophageal reflux disease), Headache, Hearing loss, Hyperlipidemia, Hypertension, Kidney stone, Refusal of blood transfusions as patient is Synagogue, Sleep apnea, Tinnitus, Venous ulcer ([address] and Wound, open. >>For CHF and Comorbidity documentation on Education Time and Topics, please see Education Tab    Progressive Mobility Assessment:  What is this patient's Current Level of Mobility?: Ambulatory-Up Ad Mami  How was this patient Mobilized today?: Edge of Bed, Up to Chair,  Up to Toilet/Shower and Up in Room, ambulated 10 ft                 With Whom?  Nurse and PCA                 Level of Difficulty/Assistance: 1x Assist     Pt sitting in bed at this time on 4 L O2. Pt denies shortness of breath. Pt with pitting lower extremity edema.      Patient and/or Family's stated Goal of Care this Admission: reduce shortness of breath, increase activity tolerance, better understand heart failure and disease management, be more comfortable and reduce lower extremity edema prior to discharge        :

## 2022-05-01 NOTE — PLAN OF CARE
Patient's EF (Ejection Fraction) is greater than 40%    Heart Failure Medications:   Diuretics[de-identified] Torsemide and Metalozone     (One of the following REQUIRED for EF </= 40%/SYSTOLIC FAILURE but MAY be used in EF% >40%/DIASTOLIC FAILURE)        ACE[de-identified] None        ARB[de-identified] None         ARNI[de-identified] None    (Beta Blockers)   NON- Evidenced Based Beta Blocker (for EF% >40%/DIASTOLIC FAILURE): None     Evidenced Based Beta Blocker::(REQUIRED for EF% <40%/SYSTOLIC FAILURE) Carvedilol- Coreg  . .................................................................................................................................................. Patient's weights and intake/output reviewed: Yes    Patient's Last Weight: 317 lbs obtained by standing scale. Difference of 2 lbs more than last documented weight. Intake/Output Summary (Last 24 hours) at 5/1/2022 1354  Last data filed at 5/1/2022 1224  Gross per 24 hour   Intake 1440 ml   Output 1775 ml   Net -335 ml       Comorbidities Reviewed Yes    Patient has a past medical history of Anemia, Angina, Arthritis, CAD (coronary artery disease), CHF (congestive heart failure) ([facility]), CKD (chronic kidney disease) stage 3, GFR 30-59 ml/min ([facility]), Clostridium difficile diarrhea, Diabetes mellitus ([facility]), Diabetic neuropathy ([facility]), Disease of blood and blood forming organ, Dizziness, GERD (gastroesophageal reflux disease), Headache, Hearing loss, Hyperlipidemia, Hypertension, Kidney stone, Refusal of blood transfusions as patient is Faith, Sleep apnea, Tinnitus, Venous ulcer ([facility]), and Wound, open. >>For CHF and Comorbidity documentation on Education Time and Topics, please see Education Tab    Progressive Mobility Assessment:  What is this patient's Current Level of Mobility?: Ambulatory-Up Ad Mami  How was this patient Mobilized today?: Edge of Bed, Up to Chair,  Up to Toilet/Shower and Up in Room, ambulated 30 ft                 With Whom?  Self                 Level of Difficulty/Assistance: Independent     Pt resting in bed at this time on 4 L O2. Pt with complaints of shortness of breath. Pt with pitting lower extremity edema.      Patient and/or Family's stated Goal of Care this Admission: reduce shortness of breath, increase activity tolerance, better understand heart failure and disease management, be more comfortable and reduce lower extremity edema prior to discharge        :

## 2022-05-01 NOTE — PROGRESS NOTES
Paged cross-cover at this time:     \"Pt is requesting something for sleep, has no prns. Can he get something ordered?  Thanks\"

## 2022-05-02 LAB
ALBUMIN SERPL-MCNC: 4 G/DL (ref 3.4–5)
ANION GAP SERPL CALCULATED.3IONS-SCNC: 10 MMOL/L (ref 3–16)
BUN BLDV-MCNC: 76 MG/DL (ref 7–20)
CALCIUM SERPL-MCNC: 10.5 MG/DL (ref 8.3–10.6)
CHLORIDE BLD-SCNC: 93 MMOL/L (ref 99–110)
CO2: 33 MMOL/L (ref 21–32)
CREAT SERPL-MCNC: 1.6 MG/DL (ref 0.8–1.3)
GFR AFRICAN AMERICAN: 52
GFR NON-AFRICAN AMERICAN: 43
GLUCOSE BLD-MCNC: 223 MG/DL (ref 70–99)
GLUCOSE BLD-MCNC: 228 MG/DL (ref 70–99)
GLUCOSE BLD-MCNC: 250 MG/DL (ref 70–99)
GLUCOSE BLD-MCNC: 272 MG/DL (ref 70–99)
GLUCOSE BLD-MCNC: 284 MG/DL (ref 70–99)
PERFORMED ON: ABNORMAL
PHOSPHORUS: 3.7 MG/DL (ref 2.5–4.9)
POTASSIUM SERPL-SCNC: 3.6 MMOL/L (ref 3.5–5.1)
SODIUM BLD-SCNC: 136 MMOL/L (ref 136–145)

## 2022-05-02 PROCEDURE — 36415 COLL VENOUS BLD VENIPUNCTURE: CPT

## 2022-05-02 PROCEDURE — 1200000000 HC SEMI PRIVATE

## 2022-05-02 PROCEDURE — 94761 N-INVAS EAR/PLS OXIMETRY MLT: CPT

## 2022-05-02 PROCEDURE — 2580000003 HC RX 258: Performed by: INTERNAL MEDICINE

## 2022-05-02 PROCEDURE — 6370000000 HC RX 637 (ALT 250 FOR IP): Performed by: INTERNAL MEDICINE

## 2022-05-02 PROCEDURE — 6370000000 HC RX 637 (ALT 250 FOR IP): Performed by: NURSE PRACTITIONER

## 2022-05-02 PROCEDURE — 80069 RENAL FUNCTION PANEL: CPT

## 2022-05-02 PROCEDURE — 93228 REMOTE 30 DAY ECG REV/REPORT: CPT | Performed by: INTERNAL MEDICINE

## 2022-05-02 PROCEDURE — 99232 SBSQ HOSP IP/OBS MODERATE 35: CPT | Performed by: NURSE PRACTITIONER

## 2022-05-02 PROCEDURE — 2700000000 HC OXYGEN THERAPY PER DAY

## 2022-05-02 RX ORDER — INSULIN LISPRO 100 [IU]/ML
10 INJECTION, SOLUTION INTRAVENOUS; SUBCUTANEOUS
Status: DISCONTINUED | OUTPATIENT
Start: 2022-05-02 | End: 2022-05-03 | Stop reason: HOSPADM

## 2022-05-02 RX ADMIN — PANTOPRAZOLE SODIUM 20 MG: 20 TABLET, DELAYED RELEASE ORAL at 17:39

## 2022-05-02 RX ADMIN — TORSEMIDE 100 MG: 100 TABLET ORAL at 09:39

## 2022-05-02 RX ADMIN — OXYCODONE AND ACETAMINOPHEN 1 TABLET: 5; 325 TABLET ORAL at 09:39

## 2022-05-02 RX ADMIN — OXYCODONE AND ACETAMINOPHEN 1 TABLET: 5; 325 TABLET ORAL at 17:39

## 2022-05-02 RX ADMIN — TAMSULOSIN HYDROCHLORIDE 0.4 MG: 0.4 CAPSULE ORAL at 05:24

## 2022-05-02 RX ADMIN — INSULIN LISPRO 3 UNITS: 100 INJECTION, SOLUTION INTRAVENOUS; SUBCUTANEOUS at 21:33

## 2022-05-02 RX ADMIN — SODIUM CHLORIDE, PRESERVATIVE FREE 10 ML: 5 INJECTION INTRAVENOUS at 21:35

## 2022-05-02 RX ADMIN — PREGABALIN 25 MG: 25 CAPSULE ORAL at 09:39

## 2022-05-02 RX ADMIN — DOCUSATE SODIUM 100 MG: 100 CAPSULE, LIQUID FILLED ORAL at 09:39

## 2022-05-02 RX ADMIN — INSULIN LISPRO 6 UNITS: 100 INJECTION, SOLUTION INTRAVENOUS; SUBCUTANEOUS at 09:42

## 2022-05-02 RX ADMIN — CARVEDILOL 3.12 MG: 3.12 TABLET, FILM COATED ORAL at 17:39

## 2022-05-02 RX ADMIN — OXYCODONE AND ACETAMINOPHEN 1 TABLET: 5; 325 TABLET ORAL at 03:43

## 2022-05-02 RX ADMIN — PANTOPRAZOLE SODIUM 20 MG: 20 TABLET, DELAYED RELEASE ORAL at 05:24

## 2022-05-02 RX ADMIN — ALLOPURINOL 300 MG: 300 TABLET ORAL at 09:39

## 2022-05-02 RX ADMIN — SENNOSIDES 8.6 MG: 8.6 TABLET, COATED ORAL at 21:30

## 2022-05-02 RX ADMIN — ATORVASTATIN CALCIUM 80 MG: 80 TABLET, FILM COATED ORAL at 21:30

## 2022-05-02 RX ADMIN — Medication 3 MG: at 21:30

## 2022-05-02 RX ADMIN — INSULIN GLARGINE 35 UNITS: 100 INJECTION, SOLUTION SUBCUTANEOUS at 09:42

## 2022-05-02 RX ADMIN — ASPIRIN 81 MG 81 MG: 81 TABLET ORAL at 09:39

## 2022-05-02 RX ADMIN — INSULIN LISPRO 4 UNITS: 100 INJECTION, SOLUTION INTRAVENOUS; SUBCUTANEOUS at 17:28

## 2022-05-02 RX ADMIN — INSULIN LISPRO 4 UNITS: 100 INJECTION, SOLUTION INTRAVENOUS; SUBCUTANEOUS at 12:23

## 2022-05-02 RX ADMIN — PREGABALIN 25 MG: 25 CAPSULE ORAL at 21:30

## 2022-05-02 RX ADMIN — MICONAZOLE NITRATE: 2 POWDER TOPICAL at 21:41

## 2022-05-02 RX ADMIN — DOCUSATE SODIUM 100 MG: 100 CAPSULE, LIQUID FILLED ORAL at 21:30

## 2022-05-02 RX ADMIN — BRIMONIDINE TARTRATE, TIMOLOL MALEATE 1 DROP: 2; 5 SOLUTION/ DROPS OPHTHALMIC at 21:38

## 2022-05-02 RX ADMIN — SENNOSIDES 8.6 MG: 8.6 TABLET, COATED ORAL at 09:39

## 2022-05-02 RX ADMIN — CARVEDILOL 3.12 MG: 3.12 TABLET, FILM COATED ORAL at 09:39

## 2022-05-02 ASSESSMENT — PAIN DESCRIPTION - ORIENTATION
ORIENTATION: RIGHT;LEFT;LOWER
ORIENTATION: RIGHT;LEFT;LOWER

## 2022-05-02 ASSESSMENT — PAIN DESCRIPTION - ONSET
ONSET: ON-GOING
ONSET: ON-GOING

## 2022-05-02 ASSESSMENT — PAIN DESCRIPTION - FREQUENCY
FREQUENCY: CONTINUOUS
FREQUENCY: CONTINUOUS

## 2022-05-02 ASSESSMENT — PAIN - FUNCTIONAL ASSESSMENT
PAIN_FUNCTIONAL_ASSESSMENT: PREVENTS OR INTERFERES SOME ACTIVE ACTIVITIES AND ADLS
PAIN_FUNCTIONAL_ASSESSMENT: PREVENTS OR INTERFERES SOME ACTIVE ACTIVITIES AND ADLS

## 2022-05-02 ASSESSMENT — PAIN SCALES - GENERAL
PAINLEVEL_OUTOF10: 7
PAINLEVEL_OUTOF10: 10
PAINLEVEL_OUTOF10: 7
PAINLEVEL_OUTOF10: 0

## 2022-05-02 ASSESSMENT — PAIN DESCRIPTION - PAIN TYPE
TYPE: CHRONIC PAIN
TYPE: CHRONIC PAIN

## 2022-05-02 ASSESSMENT — PAIN DESCRIPTION - LOCATION
LOCATION: FOOT;LEG
LOCATION: LEG;FOOT

## 2022-05-02 ASSESSMENT — PAIN DESCRIPTION - DESCRIPTORS
DESCRIPTORS: ACHING;CRAMPING
DESCRIPTORS: ACHING;CRAMPING

## 2022-05-02 NOTE — PROGRESS NOTES
Hospitalist Progress Note      PCP: PATRICK iY - CNP    Date of Admission: 4/24/2022    Chief Complaint:   Edema    Hospital Course: reviewed. Subjective:   Pt continues to endorse swelling. Feels his breathing is much better. Admits to not adhering to carb restricted diet or low fluid/low sodium diet. Home diuretic restarted inpt with good results. Pt would liek to go home and try again medical mgmt. I expressed my concerns regarding the pts multiple readmissions. He has met with [facility] and is appreciative.      Very long d/w pt regarding carb restricted diet and hf diet    Medications:  Reviewed    Infusion Medications    dextrose      sodium chloride       Scheduled Medications    melatonin  3 mg Oral Nightly    brimonidine-timolol  1 drop Both Eyes Nightly    Netarsudil Dimesylate  1 drop Both Eyes Nightly    torsemide  100 mg Oral Daily    heparin (porcine)  5,000 Units SubCUTAneous 3 times per day    allopurinol  300 mg Oral Daily    aspirin  81 mg Oral Daily    atorvastatin  80 mg Oral Nightly    carvedilol  3.125 mg Oral BID WC    docusate sodium  100 mg Oral BID    ferrous sulfate  325 mg Oral BID    insulin glargine  35 Units SubCUTAneous Daily    insulin lispro  0-12 Units SubCUTAneous TID WC    insulin lispro  0-6 Units SubCUTAneous Nightly    pantoprazole  20 mg Oral BID AC    pregabalin  25 mg Oral BID    senna  1 tablet Oral BID    tamsulosin  0.4 mg Oral Daily    sodium chloride flush  5-40 mL IntraVENous 2 times per day    miconazole   Topical BID     PRN Meds: famotidine, glucose, glucagon (rDNA), dextrose, nitroGLYCERIN, sodium chloride flush, sodium chloride, ondansetron **OR** ondansetron, polyethylene glycol, acetaminophen **OR** acetaminophen, dextrose bolus (hypoglycemia) **OR** dextrose bolus (hypoglycemia), oxyCODONE-acetaminophen      Intake/Output Summary (Last 24 hours) at 5/2/2022 1852  Last data filed at 5/2/2022 1613  Gross per 24 hour Intake 1320 ml   Output 3075 ml   Net -1755 ml       Physical Exam Performed:    /64   Pulse 68   Temp 97.6 °F (36.4 °C) (Oral)   Resp 18   Ht 5' 9\" (1.753 m)   Wt (!) 312 lb 9.6 oz (141.8 kg)   SpO2 98%   BMI 46.16 kg/m²     General appearance: No apparent distress, appears stated age and cooperative. stting at bedside  HEENT: Pupils equal, round, and reactive to light. Conjunctivae/corneas clear. Neck: Supple, with full range of motion. No jugular venous distention. Trachea midline. Respiratory:  Normal respiratory effort. On 4L NC  Cardiovascular: Regular rate and rhythm with normal S1/S2 without murmurs, rubs or gallops. Abdomen: Abd distended/obese. Musculoskeletal: No clubbing, cyanosis. 3+ edema in BLE. Full range of motion without deformity. Skin: Skin color, texture, turgor normal.  Erythema bilateral lower extremities. Neurologic:  Neurovascularly intact without any focal sensory/motor deficits. Cranial nerves: II-XII intact, grossly non-focal.  Psychiatric: Alert and oriented, thought content appropriate, normal insight  Capillary Refill: Brisk,3 seconds, normal   Peripheral Pulses: +2 palpable, equal bilaterally       Labs:   Recent Labs     04/30/22  0652   HGB 10.8*   HCT 33.7*     Recent Labs     04/30/22  0652 05/01/22  0807 05/02/22  0817    138 136   K 3.8 3.8 3.6   CL 95* 95* 93*   CO2 32 30 33*   BUN 84* 78* 76*   CREATININE 1.7* 1.6* 1.6*   CALCIUM 10.5 10.3 10.5   PHOS  --  3.6 3.7     No results for input(s): AST, ALT, BILIDIR, BILITOT, ALKPHOS in the last 72 hours. No results for input(s): INR in the last 72 hours. No results for input(s): Poppy Basques in the last 72 hours. Results for Remonia Spells \"DON\" (MRN [id number]) as of 5/1/2022 11:45   Ref.  Range 5/1/2022 08:07   Pro-BNP Latest Ref Range: 0 - 124 pg/mL 4,695 (H)     Urinalysis:      Lab Results   Component Value Date    NITRU Negative 05/01/2022    WBCUA 0-2 11/05/2013    BACTERIA Rare 11/05/2013    RBCUA 0-2 11/05/2013    BLOODU Negative 05/01/2022    SPECGRAV 1.010 05/01/2022    GLUCOSEU Negative 05/01/2022    GLUCOSEU NEGATIVE 02/29/2012       Radiology:  XR CHEST PORTABLE   Final Result   Prior sternotomy. Marked cardiomegaly pulmonary vascular congestion and   trace effusions, most compatible with CHF. Due to the degree of pulmonary   edema, underlying pneumonia could be hidden. Slightly greater pulmonary   edema was noted when compared to 03/31/2022. Assessment/Plan:    Active Hospital Problems    Diagnosis     Elevated troponin [R77.8]      Priority: Medium    Acute diastolic HF (heart failure) ([facility]) [I50.31]      Priority: Medium    PAF (paroxysmal atrial fibrillation) ([facility]) [I48.0]     CKD (chronic kidney disease) [N18.9]     Severe obstructive sleep apnea [G47.33]     Obesity, Class III, BMI 40-49.9 (morbid obesity) ([facility] [E66.01]     HLD (hyperlipidemia) [E78.5]     Morbid obesity (HCC) [E66.01]     S/P CABG x 3 [Z95.1]     Essential hypertension [I10]     Anemia of chronic disease [D63.8]     DM (diabetes mellitus) ([facility] [address] [E11.9]     Coronary artery disease involving native coronary artery of native heart without angina pectoris [I25.10]      1. Acute on chronic diastolic CHF - Continue diuresis per nephrology. Received metolazone. Monitor strict I/O's. Ry wt ~287. etiology for acute decompensation is diet non adherence  2. CAD s/p CABG - Continue ASA and statin. 3. MARGARITO on CKD - Creatinine stable ~ 1.7. Monitor. 4. DMII - Continue to monitor. Continue SS Insulin. 5. Hyperlipidemia - Continue Statin. DVT Prophylaxis: Lovenox  Diet: ADULT DIET; Regular; 4 carb choices (60 gm/meal); Low Sodium (2 gm); 1500 ml  Code Status: DNR-CCA    PT/OT Eval Status:  Following    Dispo - home likely 05/03 if BG controlled and good diuretic response with Mariana Valdes MD

## 2022-05-02 NOTE — PROGRESS NOTES
Sitter at bedside for pt safety at this time per clinical request due to pt refusing bed alarm and having ruined multiple tele boxes due to carelessness during this admission.

## 2022-05-02 NOTE — PROGRESS NOTES
[facility] Progress Note           Subjective/   75y.o. year old male who we are seeing in consultation for MARGARITO/CKD-3. HPI:  The patient was seen and examined; he feels well today with no CP, SOB, nausea or vomiting. ROS: No fever or chills. Social: No family at bedside. Objective/   GEN:  Chronically ill, /61   Pulse 68   Temp 97.6 °F (36.4 °C) (Axillary)   Resp 18   Ht 5' 9\" (1.753 m)   Wt (!) 312 lb 9.6 oz (141.8 kg)   SpO2 95%   BMI 46.16 kg/m²      HEENT: non-icteric, no JVD  CV: S1, S2 without m/r/g; +++ LE edema  RESP: CTA B without w/r/r; breathing wnl  ABD: +bs, soft, nt, no hsm  SKIN: warm, no rashes    Data/  Recent Labs     04/30/22  0652   HGB 10.8*   HCT 33.7*     Recent Labs     04/30/22  0652 05/01/22  0807 05/02/22  0817    138 136   K 3.8 3.8 3.6   CL 95* 95* 93*   CO2 32 30 33*   GLUCOSE 296* 227* 272*   PHOS  --  3.6 3.7   MG 2.50*  --   --    BUN 84* 78* 76*   CREATININE 1.7* 1.6* 1.6*   LABGLOM 40* 43* 43*   GFRAA 49* 52* 52*       Assessment/     - Acute on chronic kidney disease stage 3              Worsening in setting of CHF decompensation, hx of volatile fluctuations in levels              based on volume status & diuretic use              Background DM Nephropathy + Cardiorenal syndrome              Baseline SCr mid 1 range, 1.5-1. 7              Follows with Dr. Jessi Hastings in office              Improving with diuresis in past                - dCHF exacerbation     - DM2 - poorly controlled     - Normocytic anemia, chronic - Hb at target     - Pentec[employer]al    Plan/     - Retarted home dose Torsemide 100 mg with addition of PRN Metolazone  - Discussed possibility of ultrafiltration treatments for further fluid removal - patient not excited about the idea as he does not think he could do long-term  - Trend labs, bp's, weights, & urine output

## 2022-05-02 NOTE — PROGRESS NOTES
Occupational Therapy    Chart reviewed, attempted to see pt for follow up treatment this date; attempted x 2 this pm. Pt sleeping in bed, does not arouse easily.     Timmy Urias

## 2022-05-02 NOTE — PROGRESS NOTES
[address]   Daily Progress Note    Admit Date:  4/24/2022  HPI:    Chief Complaint   Patient presents with    Shortness of Breath     Hx CHF. Feels swollen & SOB. 3-4L @ home. Currently on 6L n/c. Interval history: Angelica Honeycutt is being followed for CHF. Known to our cardiovascular service. Recently admitted, discharge weight on 4/9/2022 was 287 pounds. Weight this admission at 316 pounds. Started on lasix infusion. Subjective:  Mr. Eber Sheehan is sleepy this afternoon. Weight is down 5lbs since yesterday. Objective:   /61   Pulse 68   Temp 97.6 °F (36.4 °C) (Axillary)   Resp 18   Ht 5' 9\" (1.753 m)   Wt (!) 312 lb 9.6 oz (141.8 kg)   SpO2 95%   BMI 46.16 kg/m²       Intake/Output Summary (Last 24 hours) at 5/2/2022 1513  Last data filed at 5/2/2022 1308  Gross per 24 hour   Intake 1080 ml   Output 3425 ml   Net -2345 ml       NYHA: IV    Physical Exam:  General:  Awake, alert, NAD, obese, laying on his right side in the bed   Skin:  Warm and dry  Chest:  Dim to auscultation throughout   Telemetry: not on telemetry   Cardiovascular:  RRR S1S2, + murmur, no r/g   Abdomen: nontender, +bowel sounds, tight anasarca   Extremities:  +++ bilateral lower extremity edema up to the thigh edema  Bilateral lower legs wrapped with ace wraps.      Medications:    melatonin  3 mg Oral Nightly    brimonidine-timolol  1 drop Both Eyes Nightly    Netarsudil Dimesylate  1 drop Both Eyes Nightly    torsemide  100 mg Oral Daily    heparin (porcine)  5,000 Units SubCUTAneous 3 times per day    allopurinol  300 mg Oral Daily    aspirin  81 mg Oral Daily    atorvastatin  80 mg Oral Nightly    carvedilol  3.125 mg Oral BID WC    docusate sodium  100 mg Oral BID    [Held by provider] ferrous sulfate  325 mg Oral BID    insulin glargine  35 Units SubCUTAneous Daily    insulin lispro  0-12 Units SubCUTAneous TID WC    insulin lispro  0-6 Units SubCUTAneous Nightly    pantoprazole 20 mg Oral BID AC    pregabalin  25 mg Oral BID    senna  1 tablet Oral BID    tamsulosin  0.4 mg Oral Daily    sodium chloride flush  5-40 mL IntraVENous 2 times per day    miconazole   Topical BID      dextrose      sodium chloride         Lab Data:  CBC:   Recent Labs     04/30/22  0652   HGB 10.8*     BMP:    Recent Labs     04/30/22  0652 05/01/22  0807 05/02/22  0817    138 136   K 3.8 3.8 3.6   CO2 32 30 33*   BUN 84* 78* 76*   CREATININE 1.7* 1.6* 1.6*     INR:  No results for input(s): INR in the last 72 hours. BNP:    Recent Labs     05/01/22  0807   PROBNP 4,695*     Lab Results   Component Value Date    LVEF 67 03/10/2022       Testing:    Echocardiogram 1/13/2022   Summary   Limited only f/u for LVEF and aortic valve stenosis. Technically difficult examination. Low normal left ventricular systolic function with ejection fraction of 50%. Moderate aortic stenosis. Aortic stenosis values appear approximately same when compared to echo on 8-. Mild to moderate aortic regurgitation. Principal Problem:    Acute diastolic HF (heart failure) ([facility])  Active Problems:    Elevated troponin    DM (diabetes mellitus) ([facility] [address]    Coronary artery disease involving native coronary artery of native heart without angina pectoris    Anemia of chronic disease    Essential hypertension    Morbid obesity ([facility])    S/P CABG x 3    HLD (hyperlipidemia)    Severe obstructive sleep apnea    Obesity, Class III, BMI 40-49.9 (morbid obesity) ([facility])    CKD (chronic kidney disease)    PAF (paroxysmal atrial fibrillation) ([facility] [address]  Resolved Problems:    * No resolved hospital problems.  *      Assessment:  Acute on chronic diastolic heart failure  CAD s/p CABG x3  PAF; anticoagulation was discussed during his last admission, patient declines anticoagulation is willing to accept to stroke risk per year  Moderate aortic stenosis  MARGARITO on CKD  Acute on chronic hypoxemic respiratory failure  Severe HENNA  Diabetes  HLD  HTN  Anemia, patient is Baptism    Plan:  Continue aspirin statin  Continue daily weights, daily BMP, strict I's and O's  Continue carvedilol 3.125 mg p.o. twice daily  Restart PO iron  Diuretics per nephrology     Cardiology will sign off  Patient has decided he is going to return back to home. He is high risk of readmission.      Conception Robyn, PATRICK - CNP,  5/2/2022, 5:12 PM

## 2022-05-02 NOTE — PLAN OF CARE
Problem: Pain  Goal: Verbalizes/displays adequate comfort level or baseline comfort level  5/2/2022 1050 by Dean Alarcon RN  Outcome: Progressing     Problem: Safety - Adult  Goal: Free from fall injury  5/2/2022 1050 by Dean Alarcon RN  Outcome: Progressing     Problem: Chronic Conditions and Co-morbidities  Goal: Patient's chronic conditions and co-morbidity symptoms are monitored and maintained or improved  Outcome: Progressing

## 2022-05-02 NOTE — PROGRESS NOTES
Sitter DCed per clinical. Pt placed 4th in line on [employer] wait list. Pt aware of updated plan of care and reasoning.

## 2022-05-02 NOTE — PROGRESS NOTES
Patient's EF (Ejection Fraction) is greater than 40%    Heart Failure Medications:   Diuretics[de-identified] Torsemide     (One of the following REQUIRED for EF </= 40%/SYSTOLIC FAILURE but MAY be used in EF% >40%/DIASTOLIC FAILURE)        ACE[de-identified] None        ARB[de-identified] None         ARNI[de-identified] None    (Beta Blockers)   NON- Evidenced Based Beta Blocker (for EF% >40%/DIASTOLIC FAILURE): None     Evidenced Based Beta Blocker::(REQUIRED for EF% <40%/SYSTOLIC FAILURE) Carvedilol- Coreg  . .................................................................................................................................................. Patient's weights and intake/output reviewed: Yes    Patient's Last Weight: 312 lbs obtained by standing scale. Difference of 5 lbs less than last documented weight. Intake/Output Summary (Last 24 hours) at 5/2/2022 1050  Last data filed at 5/2/2022 0754  Gross per 24 hour   Intake 360 ml   Output 3075 ml   Net -2715 ml       Comorbidities Reviewed Yes    Patient has a past medical history of Anemia, Angina, Arthritis, CAD (coronary artery disease), CHF (congestive heart failure) ([address] CKD (chronic kidney disease) stage 3, GFR 30-59 ml/min ([facility]), Clostridium difficile diarrhea, Diabetes mellitus ([address] Diabetic neuropathy ([address] Disease of blood and blood forming organ, Dizziness, GERD (gastroesophageal reflux disease), Headache, Hearing loss, Hyperlipidemia, Hypertension, Kidney stone, Refusal of blood transfusions as patient is Orthodoxy, Sleep apnea, Tinnitus, Venous ulcer ([address] and Wound, open. >>For CHF and Comorbidity documentation on Education Time and Topics, please see Education Tab    Progressive Mobility Assessment:  What is this patient's Current Level of Mobility?: Ambulatory- with Assistance  How was this patient Mobilized today?: Edge of Bed, Up to Chair, Bedside Commode and  Up to Toilet/Shower, ambulated 10 ft                 With Whom?  Nurse, PCA, PT, OT and Self Level of Difficulty/Assistance: 1x Assist     Pt sitting in bed at this time on 4 L O2. Pt with complaints of shortness of breath. Pt with pitting lower extremity edema.      Patient and/or Family's stated Goal of Care this Admission: reduce shortness of breath, increase activity tolerance, better understand heart failure and disease management, be more comfortable and reduce lower extremity edema prior to discharge        :

## 2022-05-02 NOTE — PROGRESS NOTES
NP paged,\"Pt has ruined over 11 telemetry boxes due to carelessness ( getting them wet by dropping them in water ect.) he is refusing a bed alarm and insists on getting up on his own. We have d/c tele before but it has been reorderd by cardiologist and he is in an active CHF exacerbation and does not seem approritate to pt safety to d/c tele at this time. Clinical is aware of the exessive damage and is requesting that we test pt. for UTI and ammonia levels in order to rule out any infection that might be causing confusion, before do any other interventions. Can we have these tests ordered for him? Thank you. \"

## 2022-05-02 NOTE — PLAN OF CARE
Problem: Pain  Goal: Verbalizes/displays adequate comfort level or baseline comfort level  5/2/2022 0205 by Nancy Ling RN  Outcome: Progressing  Note: Pt will be satisfied with pain control. Pt uses numeric pain rating scale with reassessments after pain med administration. Will continue to monitor progression throughout shift. Problem: Skin/Tissue Integrity  Goal: Absence of new skin breakdown  Description: 1. Monitor for areas of redness and/or skin breakdown  2. Assess vascular access sites hourly  3. Every 4-6 hours minimum:  Change oxygen saturation probe site  4. Every 4-6 hours:  If on nasal continuous positive airway pressure, respiratory therapy assess nares and determine need for appliance change or resting period. 5/2/2022 0205 by Nancy Ling RN  Outcome: Progressing  Note: Pt is at risk for skin breakdown. Pt will have skin assessments every shift, Q2 turns, heels elevated off of the bed, and friction and shear prevented when possible. Will continue to monitor for signs of skin breakdown and enforce prevention measures. Problem: Safety - Adult  Goal: Free from fall injury  5/2/2022 0205 by Nancy Ling RN  Outcome: Progressing  Note: Pt will remain free from falls throughout hospital stay. Fall precautions in place, bed alarm on, bed in lowest position with wheels locked and side rails 2/4 up. Room door open and hourly rounding completed. Will continue to monitor throughout shift. Problem: Chronic Conditions and Co-morbidities  Goal: Patient's chronic conditions and co-morbidity symptoms are monitored and maintained or improved  Outcome: Progressing  Note: Pt will have accuchecks before meals and at bedtime with sliding scale insulin in place for coverage. Will continue to monitor for signs and symptoms of hypoglycemia and hyperglycemia throughout shift.

## 2022-05-03 VITALS
SYSTOLIC BLOOD PRESSURE: 117 MMHG | HEART RATE: 64 BPM | TEMPERATURE: 97.9 F | WEIGHT: 309.56 LBS | DIASTOLIC BLOOD PRESSURE: 53 MMHG | OXYGEN SATURATION: 94 % | HEIGHT: 69 IN | RESPIRATION RATE: 14 BRPM | BODY MASS INDEX: 45.85 KG/M2

## 2022-05-03 LAB
ALBUMIN SERPL-MCNC: 4.1 G/DL (ref 3.4–5)
ANION GAP SERPL CALCULATED.3IONS-SCNC: 11 MMOL/L (ref 3–16)
BUN BLDV-MCNC: 73 MG/DL (ref 7–20)
CALCIUM SERPL-MCNC: 10.6 MG/DL (ref 8.3–10.6)
CHLORIDE BLD-SCNC: 93 MMOL/L (ref 99–110)
CO2: 35 MMOL/L (ref 21–32)
CREAT SERPL-MCNC: 1.5 MG/DL (ref 0.8–1.3)
GFR AFRICAN AMERICAN: 56
GFR NON-AFRICAN AMERICAN: 47
GLUCOSE BLD-MCNC: 271 MG/DL (ref 70–99)
GLUCOSE BLD-MCNC: 328 MG/DL (ref 70–99)
GLUCOSE BLD-MCNC: 329 MG/DL (ref 70–99)
PERFORMED ON: ABNORMAL
PERFORMED ON: ABNORMAL
PHOSPHORUS: 3.4 MG/DL (ref 2.5–4.9)
POTASSIUM SERPL-SCNC: 3.5 MMOL/L (ref 3.5–5.1)
SODIUM BLD-SCNC: 139 MMOL/L (ref 136–145)

## 2022-05-03 PROCEDURE — 36415 COLL VENOUS BLD VENIPUNCTURE: CPT

## 2022-05-03 PROCEDURE — 80069 RENAL FUNCTION PANEL: CPT

## 2022-05-03 PROCEDURE — 6370000000 HC RX 637 (ALT 250 FOR IP): Performed by: STUDENT IN AN ORGANIZED HEALTH CARE EDUCATION/TRAINING PROGRAM

## 2022-05-03 PROCEDURE — 94761 N-INVAS EAR/PLS OXIMETRY MLT: CPT

## 2022-05-03 PROCEDURE — 97530 THERAPEUTIC ACTIVITIES: CPT

## 2022-05-03 PROCEDURE — 6370000000 HC RX 637 (ALT 250 FOR IP): Performed by: INTERNAL MEDICINE

## 2022-05-03 PROCEDURE — 2700000000 HC OXYGEN THERAPY PER DAY

## 2022-05-03 PROCEDURE — 2580000003 HC RX 258: Performed by: INTERNAL MEDICINE

## 2022-05-03 RX ADMIN — INSULIN LISPRO 10 UNITS: 100 INJECTION, SOLUTION INTRAVENOUS; SUBCUTANEOUS at 09:23

## 2022-05-03 RX ADMIN — ASPIRIN 81 MG 81 MG: 81 TABLET ORAL at 09:18

## 2022-05-03 RX ADMIN — CARVEDILOL 3.12 MG: 3.12 TABLET, FILM COATED ORAL at 09:24

## 2022-05-03 RX ADMIN — FERROUS SULFATE TAB 325 MG (65 MG ELEMENTAL FE) 325 MG: 325 (65 FE) TAB at 09:18

## 2022-05-03 RX ADMIN — TAMSULOSIN HYDROCHLORIDE 0.4 MG: 0.4 CAPSULE ORAL at 06:48

## 2022-05-03 RX ADMIN — PREGABALIN 25 MG: 25 CAPSULE ORAL at 09:18

## 2022-05-03 RX ADMIN — SENNOSIDES 8.6 MG: 8.6 TABLET, COATED ORAL at 09:18

## 2022-05-03 RX ADMIN — DOCUSATE SODIUM 100 MG: 100 CAPSULE, LIQUID FILLED ORAL at 09:18

## 2022-05-03 RX ADMIN — ALLOPURINOL 300 MG: 300 TABLET ORAL at 09:17

## 2022-05-03 RX ADMIN — INSULIN LISPRO 6 UNITS: 100 INJECTION, SOLUTION INTRAVENOUS; SUBCUTANEOUS at 12:45

## 2022-05-03 RX ADMIN — SODIUM CHLORIDE, PRESERVATIVE FREE 10 ML: 5 INJECTION INTRAVENOUS at 09:18

## 2022-05-03 RX ADMIN — INSULIN GLARGINE 35 UNITS: 100 INJECTION, SOLUTION SUBCUTANEOUS at 10:24

## 2022-05-03 RX ADMIN — INSULIN LISPRO 8 UNITS: 100 INJECTION, SOLUTION INTRAVENOUS; SUBCUTANEOUS at 09:23

## 2022-05-03 RX ADMIN — INSULIN LISPRO 10 UNITS: 100 INJECTION, SOLUTION INTRAVENOUS; SUBCUTANEOUS at 12:44

## 2022-05-03 RX ADMIN — TORSEMIDE 100 MG: 100 TABLET ORAL at 09:18

## 2022-05-03 RX ADMIN — PANTOPRAZOLE SODIUM 20 MG: 20 TABLET, DELAYED RELEASE ORAL at 06:48

## 2022-05-03 NOTE — DISCHARGE INSTR - COC
Continuity of Care Form    Patient Name: Melva Posada   :  1955  MRN:  [id number]    [address] [place] [address] date:  2022  Discharge date:  ***    Code Status Order: DNR-CCA   Advance Directives:      Admitting Physician:  Brent Maldonado MD  PCP: PATRICK Child CNP    Discharging Nurse: [facility] Unit/Room#: 0216/0216-01  Discharging Unit Phone Number: ***    Emergency Contact:   Extended Emergency Contact Information  Primary Emergency Contact: [address] [place] [address] Phone: [phone]  Mobile Phone: [phone]  Relation: Other  Secondary Emergency Contact: [address] Phone: [phone]  Relation: Other    Past Surgical History:  Past Surgical History:   Procedure Laterality Date    CARDIAC SURGERY      Dec 27 2010, triple bypass    CHOLECYSTECTOMY  2011    HERNIA REPAIR  age2    OTHER SURGICAL HISTORY  10 REPAIR LOWER STERNAL INCISION, REMOVAL OF ONE STERNAL WIRE,    OTHER SURGICAL HISTORY  10/10/2014    phacoemulsification of cataract with intraocular lens implant left eye    PAIN MANAGEMENT PROCEDURE N/A 2/10/2022    MIDLINE CERVICAL SIX SEVEN EPIDURAL STEROID INJECTION SITE CONFIRMED BY FLUOROSCOPY performed by Robin Santillan MD at [address] ENDOSCOPY         Immunization History:   Immunization History   Administered Date(s) Administered    COVID-19, Pfizer Purple top, DILUTE for use, 12+ yrs, 30mcg/0.3mL dose 2021, 2021, 2021    Influenza Virus Vaccine 2012, 10/02/2017, 10/01/2018, 2019, 2020    Influenza, High Dose (Fluzone 65 yrs and older) 10/01/2019    Influenza, MDCK Quadv, IM, PF (Flucelvax 2 yrs and older) 10/05/2018    Influenza, Quadv, IM, PF (6 mo and older Fluzone, Flulaval, Fluarix, and 3 yrs and older Afluria) 2016, 10/02/2017, 2020    Influenza, Quadv, adjuvanted, 65 yrs +, IM, PF (Fluad) 2020, 2021 Influenza, Triv, inactivated, subunit, adjuvanted, IM (Fluad 65 yrs and older) 09/13/2019    Pneumococcal Conjugate 13-valent (Burnadette Barrera) 09/13/2019    Pneumococcal Polysaccharide ([id number]) 01/04/2011, 11/27/2014, 12/17/2020       Active Problems:  Patient Active Problem List   Diagnosis Code    DM (diabetes mellitus) ([facility] E11.9    Coronary artery disease involving native coronary artery of native heart without angina pectoris I25.10    Anemia of chronic disease D63.8    Essential hypertension I10    Hyperkalemia E87.5    Chronic diastolic heart failure (HCC) I50.32    Pulmonary hypertension due to left ventricular diastolic dysfunction ([facility]) I27.22    Morbid obesity ([facility]) E66.01    S/P CABG x 3 Z95.1    DM2 (diabetes mellitus, type 2) ([facility]) E11.9    Morbid obesity with BMI of 50.0-59.9, adult ([facility] E66.01, Z68.43    HLD (hyperlipidemia) E78.5    Cardiomyopathy ([facility] I42.9    Productive cough R05.8    Chronic pain G89.29    Severe obstructive sleep apnea G47.33    Obesity, Class III, BMI 40-49.9 (morbid obesity) ([facility]) V80.09    Acute diastolic congestive heart failure ([facility]) I50.31    Degeneration of lumbar or lumbosacral intervertebral disc M51.37    Displacement of lumbar intervertebral disc without myelopathy M51.26    Lumbosacral spondylosis without myelopathy M47.817    Lumbar facet arthropathy M47.816    Disc displacement, lumbar M51.26    Spinal stenosis of lumbar region M48.061    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    Tympanic membrane perforation, left H72.92    Chest pain R07.9    Cor pulmonale, chronic ([facility]) I27.81    Pulmonary hypertension due to alveolar hypoventilation disorder ([facility]) I27.23    Nonrheumatic aortic valve stenosis I35.0    Chronic neck pain M54.2, G89.29    Dyspnea B84.44    Acute diastolic CHF (congestive heart failure) ([facility]) I50.31    Cervical stenosis of spinal canal M48.02    Degeneration of cervical intervertebral disc M50.30    Cervical radiculitis M54.12    Acute on chronic diastolic CHF (congestive heart failure) ([facility]) I50.33    Acute respiratory failure with hypoxia ([facility]) J96.01    CKD (chronic kidney disease) N18.9    PAF (paroxysmal atrial fibrillation) ([facility]) K76.8    Acute diastolic HF (heart failure) ([facility]) I50.31    Elevated troponin R77.8       Isolation/Infection:   Isolation            No Isolation          Patient Infection Status       Infection Onset Added Last Indicated Last Indicated By Review Planned Expiration Resolved Resolved By    None active    Resolved    COVID-19 (Rule Out) 03/07/22 03/07/22 03/07/22 COVID-19, Rapid (Ordered)   03/07/22 Rule-Out Test Resulted    COVID-19 (Rule Out) 01/13/22 01/13/22 01/13/22 SARS-CoV-2 NAAT (Rapid) (Ordered)   01/13/22 Rule-Out Test Resulted    COVID-19 (Rule Out) 08/23/21 08/23/21 08/23/21 COVID-19, Rapid (Ordered)   08/23/21 Rule-Out Test Resulted            Nurse Assessment:  Last Vital Signs: BP (!) 117/53   Pulse 64   Temp 97.9 °F (36.6 °C) (Oral)   Resp 14   Ht 5' 9\" (1.753 m)   Wt (!) 309 lb 9 oz (140.4 kg)   SpO2 94%   BMI 45.71 kg/m²     Last documented pain score (0-10 scale): Pain Level: 7  Last Weight:   Wt Readings from Last 1 Encounters:   05/03/22 (!) 309 lb 9 oz (140.4 kg)     Mental Status:  oriented, alert, coherent, logical, and thought processes intact    IV Access:  - None    Nursing Mobility/ADLs:  Walking   Assisted  Transfer  Assisted  Bathing  Assisted  Dressing  Assisted  Toileting  Assisted  Feeding  Assisted  Med Admin  Assisted  Med Delivery   whole    Wound Care Documentation and Therapy:  Wound 01/14/22 Leg Left; Lower; Lateral (Active)   Wound Image   04/26/22 1153   Wound Etiology Venous 05/02/22 0939   Dressing Status Clean;Dry; Intact 05/02/22 0939   Wound Cleansed Cleansed with saline 05/02/22 9106   Dressing/Treatment Xeroform;Dry dressing;Roll gauze;ABD; Ace wrap 05/02/22 0939   Offloading for Diabetic Foot Ulcers Other (comment) 05/02/22 0939   Dressing Change Due 05/03/22 05/02/22 0939   Wound Length (cm) 9.5 cm 04/26/22 1153   Wound Width (cm) 8 cm 04/26/22 1153   Wound Depth (cm) 0.1 cm 04/26/22 1153   Wound Surface Area (cm^2) 76 cm^2 04/26/22 1153   Change in Wound Size % (l*w) -744.44 04/26/22 1153   Wound Volume (cm^3) 7.6 cm^3 04/26/22 1153   Wound Healing % -744 04/26/22 1153   Distance Tunneling (cm) 0 cm 04/26/22 1153   Tunneling Position ___ O'Clock 0 04/26/22 1153   Undermining Starts ___ O'Clock 0 04/26/22 1153   Undermining Ends___ O'Clock 0 04/26/22 1153   Undermining Maxium Distance (cm) 0 04/26/22 1153   Wound Assessment Bleeding;Blood filled blister;Slough 05/01/22 0835   Drainage Amount Moderate 04/30/22 2315   Drainage Description Serous 04/30/22 2315   Odor None 04/30/22 2315   Radha-wound Assessment Hemosiderin staining (brown yellow);Edematous 05/01/22 0835   Margins Defined edges 04/30/22 2315   Wound Thickness Description not for Pressure Injury Partial thickness 04/30/22 2315   Number of days: 108       Wound 01/14/22 Leg Left; Lower; Lateral (Active)   Number of days: 108       Wound 04/04/22 Leg Right; Lower; Inner;Lateral cluster of 2 (Active)   Wound Image   04/26/22 1153   Wound Etiology Venous 05/03/22 0958   Dressing Status Clean;Dry; Intact 05/03/22 0958   Wound Cleansed Cleansed with saline 05/02/22 1126   Dressing/Treatment Xeroform;Dry dressing;Roll gauze; Ace wrap 05/02/22 0939   Offloading for Diabetic Foot Ulcers Other (comment) 05/02/22 0939   Dressing Change Due 05/03/22 05/02/22 0939   Wound Length (cm) 13 cm 04/26/22 1153   Wound Width (cm) 13 cm 04/26/22 1153   Wound Depth (cm) 0.1 cm 04/26/22 1153   Wound Surface Area (cm^2) 169 cm^2 04/26/22 1153   Change in Wound Size % (l*w) -350.67 04/26/22 1153   Wound Volume (cm^3) 16.9 cm^3 04/26/22 1153   Wound Healing % -351 04/26/22 1153   Distance Tunneling (cm) 0 cm 04/30/22 1123   Tunneling Position ___ O'Clock 0 04/30/22 1123 Undermining Starts ___ O'Clock 0 04/30/22 1123   Undermining Ends___ O'Clock 0 04/30/22 1123   Undermining Maxium Distance (cm) 0 04/30/22 1123   Wound Assessment Pink/red 05/03/22 0958   Drainage Amount Small 05/03/22 0958   Drainage Description Serosanguinous 05/03/22 0958   Odor None 05/03/22 0958   Radha-wound Assessment Hemosiderin staining (brown yellow) 05/03/22 0958   Margins Defined edges 05/03/22 0958   Wound Thickness Description not for Pressure Injury Partial thickness 04/30/22 2315   Number of days: 28       Wound 04/26/22 Leg Left; Inner open in clusters (Active)   Wound Image   04/26/22 1153   Wound Etiology Venous 05/03/22 0958   Dressing Status Clean;Dry; Intact 05/03/22 0958   Wound Cleansed Cleansed with saline 05/03/22 0958   Dressing/Treatment Dry dressing;Gauze dressing/dressing sponge;ABD;Xeroform 05/03/22 0958   Offloading for Diabetic Foot Ulcers Offloading ordered 04/30/22 2315   Dressing Change Due 05/01/22 04/30/22 2315   Wound Length (cm) 14 cm 04/26/22 1153   Wound Width (cm) 7 cm 04/26/22 1153   Wound Depth (cm) 0.1 cm 04/26/22 1153   Wound Surface Area (cm^2) 98 cm^2 04/26/22 1153   Wound Volume (cm^3) 9.8 cm^3 04/26/22 1153   Distance Tunneling (cm) 0 cm 04/30/22 1123   Tunneling Position ___ O'Clock 0 04/30/22 1123   Undermining Starts ___ O'Clock 0 04/30/22 1123   Undermining Ends___ O'Clock 0 04/30/22 1123   Undermining Maxium Distance (cm) 0 04/30/22 1123   Wound Assessment Pink/red;Pale granulation tissue 05/01/22 0835   Drainage Amount Moderate 04/30/22 2315   Drainage Description Serous 04/30/22 2315   Odor None 04/30/22 2315   Radha-wound Assessment Warm;Edematous 05/01/22 0835   Margins Defined edges 04/30/22 2315   Wound Thickness Description not for Pressure Injury Partial thickness 04/30/22 1661   Number of days: 7        Elimination:  Continence:    Bowel: Yes  Bladder: Yes  Urinary Catheter: None   Colostomy/Ileostomy/Ileal Conduit: No       Date of Last BM: 05/02/2022    Intake/Output Summary (Last 24 hours) at 5/3/2022 1301  Last data filed at [address] [place] [address] 09  Gross per 24 hour   Intake 1340 ml   Output 2800 ml   Net -1460 ml     I/O last 3 completed shifts: In: 1075 [P.O.:1560; Other:200]  Out: 4775 [Urine:4775]    Safety Concerns: At Risk for Falls    Impairments/Disabilities:      Vision    Nutrition Therapy:  Current Nutrition Therapy:   - Oral Diet:  General, Carb Control 3 carbs/meal (1500kcals/day), and Low Sodium (3-4gm)    Routes of Feeding: Oral  Liquids: No Liquids  Daily Fluid Restriction: 1500ml  Last Modified Barium Swallow with Video (Video Swallowing Test): not done    Treatments at the Time of Hospital Discharge:   Respiratory Treatments:   Oxygen Therapy:  is on oxygen at 4 L/min per nasal cannula. Ventilator:    - CPAP   {[facility] CPAP Settings:[id number]}    Rehab Therapies: Physical Therapy and Occupational Therapy  Weight Bearing Status/Restrictions: No weight bearing restrictions  Other Medical Equipment (for information only, NOT a DME order): Other Treatments: ***    Patient's personal belongings (please select all that are sent with patient):  {[facility] DME Belongings:[id number]}    RN SIGNATURE:  {Esignature:[id number]}    CASE MANAGEMENT/SOCIAL WORK SECTION    Inpatient Status Date: 04/24/2022    Readmission Risk Assessment Score:  Readmission Risk              Risk of Unplanned Readmission:  40           Discharging to Facility/ [address] [id number]   [phone]  / signature: Electronically signed by Sahara Arredondo RN on 5/3/22 at 1:01 PM EDT    PHYSICIAN SECTION    Prognosis: Guarded    Condition at Discharge: Stable    Rehab Potential (if transferring to Rehab): Fair    Recommended Labs or Other Treatments After Discharge: daily weight and urine output. Titration of insuling to reach target blood glucose gaol of 100-140 fasting. follow up with PCP in 1-2 weeks. Cardiology follow up May 9th    Physician Certification: I certify the above information and transfer of Kit Brantley  is necessary for the continuing treatment of the diagnosis listed and that he requires [address] for greater 30 days.      Update Admission H&P: No change in H&P    PHYSICIAN SIGNATURE:  Electronically signed by Teja Briones MD on 5/3/22 at 1:06 PM EDT

## 2022-05-03 NOTE — PROGRESS NOTES
Pt d/c'd home. Removed  IV and stopped bleeding. Catheter intact. Pt tolerated well. No redness noted at site. Notified CMU and removed tele box. Reviewed d/c instructions, home meds, and  f/u information utilizing teach-back method. . Patient verbalized understanding.

## 2022-05-03 NOTE — PROGRESS NOTES
Occupational Therapy  Facility/Department: [address] [place] [address] A2 CARD TELEMETRY  Treatment Note      Name: Sarah Berman  : 1955  MRN: [id number]  Date of Service: 5/3/2022    Discharge Recommendations:  Home with Home health OT,Home with assist PRN          Patient Diagnosis(es): The primary encounter diagnosis was Acute respiratory failure with hypoxia ([address] Diagnoses of Acute on chronic diastolic congestive heart failure (HCC) and MARGARITO (acute kidney injury) ([address] were also pertinent to this visit. Past Medical History:  has a past medical history of Anemia, Angina, Arthritis, CAD (coronary artery disease), CHF (congestive heart failure) ([address] CKD (chronic kidney disease) stage 3, GFR 30-59 ml/min (HCC), Clostridium difficile diarrhea, Diabetes mellitus ([address] Diabetic neuropathy ([address] Disease of blood and blood forming organ, Dizziness, GERD (gastroesophageal reflux disease), Headache, Hearing loss, Hyperlipidemia, Hypertension, Kidney stone, Refusal of blood transfusions as patient is Congregation, Sleep apnea, Tinnitus, Venous ulcer ([address] and Wound, open. Past Surgical History:  has a past surgical history that includes hernia repair (age2); Cholecystectomy (2011); other surgical history (-16- REPAIR LOWER STERNAL INCISION, REMOVAL OF ONE STERNAL WIRE,); Upper gastrointestinal endoscopy; Cardiac surgery; other surgical history (10/10/2014); and Pain management procedure (N/A, 2/10/2022). Assessment   Performance deficits / Impairments: Decreased ADL status; Decreased safe awareness;Decreased functional mobility   Assessment: pt normally independent with BADL's except LE self care due to Desean LE cellulitis(ace wraps), decreased reach to feet & decreased vision, & Independent with functional mobility with 4 WW; pt readmitted with Heart failure, requiring SBA for functional transfers/mobility for safety; pt continues to benefit from skilled OT services  Activity Tolerance  Activity Tolerance: Patient Tolerated treatment well  Activity Tolerance: sitting EOB:  BP = 121/61, 92 % O 2 sats on 4 L O 2; HR = 69        Plan   Plan  Times per Week: 3-5x  Current Treatment Recommendations: Functional mobility training,Safety education & training,Patient/Caregiver education & training,Equipment evaluation, education, & procurement,Positioning,Self-Care / ADL,Home management training     Restrictions  Restrictions/Precautions  Restrictions/Precautions: Fall Risk,General Precautions  Position Activity Restriction  Other position/activity restrictions: 4L O2, telemetry,    Subjective   General  Chart Reviewed: Yes,Orders,Progress Notes,History and Physical  Patient assessed for rehabilitation services?: Yes  Family / Caregiver Present: No  Referring Practitioner: Mitchell Leger MD  Diagnosis: Acute diastolic CHF  Subjective  Subjective: \"call me a yellow cab to go home\"  General Comment  Comments: RN cleared pt for OT; pt awake in bed    Objective   Pulse: 75  Heart Rate Source: Monitor  BP: 121/61  BP Location: Left upper arm  Patient Position: Sitting  MAP (Calculated): 81  Resp: 18  SpO2: 93 %  O2 Device: Nasal cannula  Vision Exceptions: Legally blind  Hearing: Within functional limits          Safety Devices  Type of Devices: All kentrell prominences offloaded;Call light within reach; Left in chair;Chair alarm in place;Nurse notified  Bed Mobility Training  Bed Mobility Training: Yes  Overall Level of Assistance: Modified independent (from Right side lying)  Supine to Sit: Modified independent  Sit to Supine:  (NEERU left sitting up in chair for breakfast)        ADL  Feeding: Setup  Grooming: Stand by assistance (standing at sink to wash hands)        Bed mobility  Supine to Sit: Modified independent (from Right side lying)  Sit to Supine: Unable to assess (Left up in chair for breakfast)  Scooting: Modified independent  Transfers  Sit to stand: Stand by assistance  Stand to sit: Stand by assistance  Vision - Basic Assessment  Visual History: Diabetic retinopathy  Patient Visual Report: Blurring of print when reading  Cognition  Overall Cognitive Status: Exceptions  Arousal/Alertness: Appropriate responses to stimuli  Following Commands:  Follows one step commands consistently  Attention Span: Attends with cues to redirect  Memory: Decreased recall of precautions  Safety Judgement: Decreased awareness of need for safety;Decreased awareness of need for assistance  Insights: Fully aware of deficits  Initiation: Does not require cues  Sequencing: Does not require cues                  Education Given To: Patient  Education Provided: Role of Therapy;Plan of Care;Precautions  Education Provided Comments: disease specific: importance of use of RED/nurse call light for assist with transfers/ADL needs, up in chair for meals & short periods of time  Education Method: Verbal  Barriers to Learning: Vision;Cognition  Education Outcome: Demonstrated understanding;Continued education needed           AM-PAC Score        AM-[facility] Inpatient Daily Activity Raw Score: 18 (05/03/22 0909)  AM-PAC Inpatient ADL T-Scale Score : 38.66 (05/03/22 0909)  ADL Inpatient CMS 0-100% Score: 46.65 (05/03/22 0909)  ADL Inpatient CMS G-Code Modifier : CK (05/03/22 0909)    Goals  Short Term Goals  Time Frame for Short term goals: 1 week 5/4  Short Term Goal 1: Pt will complete toilet transfers with SBA by 5/1- 5/03 SBA with functional transfers with walker  Short Term Goal 2: Pt will complete standing level ADLs with supervision; 5/03 SBA standing ADL's at sink  Short Term Goal 3: Pt will complete LE dressing with Gustavo-  Patient Goals   Patient goals : \"to get stronger\"       Therapy Time   Individual Concurrent Group Co-treatment   Time In  810         Time Out 835         Minutes [address]

## 2022-05-03 NOTE — CARE COORDINATION
CASE MANAGEMENT DISCHARGE SUMMARY      Discharge to: home w/[facility] and [facility]    IMM given: (date) 05/02/2022  Transportation: private  Confirmed discharge plan with:     Patient: yes     Family:  yes    Name: Neftalyrosa maria MeenaTracee Millan Contact number: [phone]   RN, name: Liam Rizvi RN    Note: [facility] will pull orders from chart.   Martin Gee RN

## 2022-05-03 NOTE — PROGRESS NOTES
Patient's EF (Ejection Fraction) is greater than 40%    Heart Failure Medications:   Diuretics::torsemide, metolazone      (One of the following REQUIRED for EF </= 40%/SYSTOLIC FAILURE but MAY be used in EF% >40%/DIASTOLIC FAILURE)        ACE[de-identified] None        ARB[de-identified] None         ARNI[de-identified] None    (Beta Blockers)   NON- Evidenced Based Beta Blocker (for EF% >40%/DIASTOLIC FAILURE): None     Evidenced Based Beta Blocker::(REQUIRED for EF% <40%/SYSTOLIC FAILURE) Carvedilol- Coreg  . .................................................................................................................................................. Patient's weights and intake/output reviewed: Yes    Patient's Last Weight: 309.9 lbs obtained by standing scale. Difference of 3 lbs less than last documented weight. Intake/Output Summary (Last 24 hours) at 5/3/2022 1015  Last data filed at [address] [place] [address]  Gross per 24 hour   Intake 1340 ml   Output 2800 ml   Net -1460 ml       Comorbidities Reviewed Yes    Patient has a past medical history of Anemia, Angina, Arthritis, CAD (coronary artery disease), CHF (congestive heart failure) ([address] CKD (chronic kidney disease) stage 3, GFR 30-59 ml/min ([facility]), Clostridium difficile diarrhea, Diabetes mellitus ([address] Diabetic neuropathy ([address] Disease of blood and blood forming organ, Dizziness, GERD (gastroesophageal reflux disease), Headache, Hearing loss, Hyperlipidemia, Hypertension, Kidney stone, Refusal of blood transfusions as patient is Quaker, Sleep apnea, Tinnitus, Venous ulcer ([address] and Wound, open. >>For CHF and Comorbidity documentation on Education Time and Topics, please see Education Tab    Progressive Mobility Assessment:  What is this patient's Current Level of Mobility?: Ambulatory- with Assistance  How was this patient Mobilized today?: Edge of Bed, Up to Chair, Bedside Commode,  Up to Toilet/Shower and Up in Room, ambulated 15   ft                 With Whom?  Nurse, PCA, PT, OT and Self                 Level of Difficulty/Assistance: 1x Assist     Pt resting in bed at this time on 4 L O2. Pt denies shortness of breath. Pt with nonpitting lower extremity edema.      Patient and/or Family's stated Goal of Care this Admission: reduce shortness of breath, increase activity tolerance, better understand heart failure and disease management, be more comfortable and reduce lower extremity edema prior to discharge        :

## 2022-05-03 NOTE — PROGRESS NOTES
[facility] Progress Note           Subjective/   75y.o. year old male who we are seeing in consultation for MARGARITO/CKD-3. HPI:  The patient was seen and examined; he feels well today with no CP, SOB, nausea or vomiting. ROS: No fever or chills. Social: No family at bedside. Objective/   GEN:  Chronically ill, /61   Pulse 75   Temp 97.9 °F (36.6 °C) (Oral)   Resp 18   Ht 5' 9\" (1.753 m)   Wt (!) 309 lb 9 oz (140.4 kg)   SpO2 93%   BMI 45.71 kg/m²      HEENT: non-icteric, no JVD  CV: S1, S2 without m/r/g; +++ LE edema  RESP: CTA B without w/r/r; breathing wnl  ABD: +bs, soft, nt, no hsm  SKIN: warm, no rashes    Data/  No results for input(s): WBC, HGB, HCT, MCV, PLT in the last 72 hours. Recent Labs     05/01/22  0807 05/02/22  0817 05/03/22  0846    136 139   K 3.8 3.6 3.5   CL 95* 93* 93*   CO2 30 33* 35*   GLUCOSE 227* 272* 328*   PHOS 3.6 3.7 3.4   BUN 78* 76* 73*   CREATININE 1.6* 1.6* 1.5*   LABGLOM 43* 43* 47*   GFRAA 52* 52* 56*       Assessment/     - Acute on chronic kidney disease stage 3              Worsening in setting of CHF decompensation, hx of volatile fluctuations in levels              based on volume status & diuretic use              Background DM Nephropathy + Cardiorenal syndrome              Baseline SCr mid 1 range, 1.5-1. 7              Follows with Dr. Neil Boyce in office              Improving with diuresis in past                - dCHF exacerbation     - DM2 - poorly controlled     - Normocytic anemia, chronic - Hb at target     - Scientology    Plan/     - Retarted home dose Torsemide 100 mg with addition of PRN Metolazone  - Discussed possibility of ultrafiltration treatments for further fluid removal - patient not excited about the idea as he does not think he could do long-term  - Trend labs, bp's, weights, & urine output

## 2022-05-03 NOTE — PROGRESS NOTES
Pt continues with glucose monitoring to achieve adequate control. Will monitor for s/s of hypo/hyperglycemia.

## 2022-05-03 NOTE — DISCHARGE SUMMARY
Hospital Medicine Discharge Summary    Patient ID: Ethlyn Cleaves      Patient's PCP: Mari Schmidt, APRN - CNP    Admit Date: 4/24/2022     Discharge Date:   05/03/2022    Admitting Provider: Millie Mahmood MD     Discharge Provider: Marylou Ya MD     Discharge Diagnoses: Active Hospital Problems    Diagnosis     Elevated troponin [R77.8]      Priority: Medium    Acute diastolic HF (heart failure) (HCC) [I50.31]      Priority: Medium    PAF (paroxysmal atrial fibrillation) (HCC) [I48.0]     CKD (chronic kidney disease) [N18.9]     Severe obstructive sleep apnea [G47.33]     Obesity, Class III, BMI 40-49.9 (morbid obesity) ([facility] [E66.01]     HLD (hyperlipidemia) [E78.5]     Morbid obesity (HCC) [E66.01]     S/P CABG x 3 [Z95.1]     Essential hypertension [I10]     Anemia of chronic disease [D63.8]     DM [facility]diabetes mellitus) [facility] [E11.9]     Coronary artery disease involving native coronary artery of native heart without angina pectoris [I25.10]        The patient was seen and examined on day of discharge and this discharge summary is in conjunction with any daily progress note from day of discharge. Hospital Course: In brief this is a 75 yoM who was readmitted for mgmt of acute on chronic diastolic HF in the setting of lifestyle modification non compliance. Multiple GOC discussions had with the pt. No changes in current regimen. Discharge with close follow up. High risk of readmission as pt does not have the social support needed at home but he declines SNF and wishes to go home to continue medical mgmt    1. Acute on chronic diastolic CHF - Continue diuresis per nephrology with torsemide daily. Received metolazone prn this admission and may need in the future PRN (he has a supply at home and nephrology d/w him when to take) Monitor strict I/O's. Ry wt ~287. etiology for acute decompensation is diet non adherence.  Dc weight not at goal at time of dc but responding well to PO diuretic. Close follow up scheduled (may 9th)  2. CAD s/p CABG - Continue ASA and statin. 3. MARGARITO on CKD - Creatinine stable ~ baseline 1.7. Monitor. 4. DMII - Continue to monitor. Continue SS Insulin with basal and scheduled meal time insulin. Previously well controlled on this regimen. Titration instructions provided  5. Hyperlipidemia - Continue Statin. History Of Present Illness:    75 y.o. male who [facility] to [facility] with above complaints  Patient with PMH of CAD s/p CABG, diastolic CHF, CKD, HTN, DM 2, HLD, sleep apnea, pulmonary hypertension presented to the ED today with complaints of increasing shortness of breath. Has had to go up on his oxygen requirements at home up to 6 L, traditionally uses 3 to 4 L/min. Also reports increased weight gain and worsening leg swelling. Has had severe orthopnea sleeping upright in a recliner every night. Does have some chest heaviness. Feels like his abdomen is swollen. Has early satiety. Patient admits that since being discharged home he has been eating and drinking a lot more when he should not have been doing that. Patient reports increased dyspnea even with minimal activity. Denies any palpitations, nausea vomiting or abdominal pain. Reports he has been compliant with his diuretics at home. He was recently admitted to [facility] for CHF exacerbation earlier this month. He is a [employer] and will not accept blood products. On discharge on 4/9/2022 he weighed 287 pounds, today he is weighing 316 pounds.       Physical Exam Performed:     BP (!) 117/53   Pulse 64   Temp 97.9 °F (36.6 °C) (Oral)   Resp 14   Ht 5' 9\" (1.753 m)   Wt (!) 309 lb 9 oz (140.4 kg)   SpO2 94%   BMI 45.71 kg/m²       General appearance:  No apparent distress, appears stated age and cooperative. HEENT:  Normal cephalic, atraumatic without obvious deformity. Pupils equal, round, and reactive to light. Extra ocular muscles intact.  Conjunctivae/corneas clear.  Neck: Supple, with full range of motion. No jugular venous distention. Trachea midline. Respiratory:  Normal respiratory effort. Clear to auscultation, bilaterally without Rales/Wheezes/Rhonchi. Cardiovascular:  Regular rate and rhythm with normal S1/S2 without murmurs, rubs or gallops. Abdomen: Soft, non-tender, non-distended with normal bowel sounds. Musculoskeletal:  No clubbing, cyanosis or edema bilaterally. Full range of motion without deformity. Skin: Skin color, texture, turgor normal.  No rashes or lesions. Neurologic:  Neurovascularly intact without any focal sensory/motor deficits. Cranial nerves: II-XII intact, grossly non-focal.  Psychiatric:  Alert and oriented, thought content appropriate, normal insight  Capillary Refill: Brisk,< 3 seconds   Peripheral Pulses: +2 palpable, equal bilaterally       Labs: For convenience and continuity at follow-up the following most recent labs are provided:      CBC:    Lab Results   Component Value Date    WBC 6.9 04/24/2022    HGB 10.8 04/30/2022    HCT 33.7 04/30/2022     04/24/2022       Renal:    Lab Results   Component Value Date     05/03/2022    K 3.5 05/03/2022    K 4.6 04/25/2022    CL 93 05/03/2022    CO2 35 05/03/2022    BUN 73 05/03/2022    CREATININE 1.5 05/03/2022    CALCIUM 10.6 05/03/2022    PHOS 3.4 05/03/2022         Significant Diagnostic Studies    Radiology:   XR CHEST PORTABLE   Final Result   Prior sternotomy. Marked cardiomegaly pulmonary vascular congestion and   trace effusions, most compatible with CHF. Due to the degree of pulmonary   edema, underlying pneumonia could be hidden. Slightly greater pulmonary   edema was noted when compared to 03/31/2022.                 Consults:     IP CONSULT TO HOSPITALIST  IP CONSULT TO CARDIOLOGY  IP CONSULT TO HEART FAILURE NURSE/COORDINATOR  IP CONSULT TO NEPHROLOGY  IP CONSULT TO HEART FAILURE NURSE/COORDINATOR  IP CONSULT TO DIETITIAN  IP CONSULT TO PALLIATIVE CARE  IP CONSULT TO HOME CARE NEEDS    Disposition:  Home with Rohit Aguayo     Condition at Discharge: Stable    Discharge Instructions/Follow-up:  PCP, cardiology and nephrology    Code Status:  DNR-CCA     Activity: activity as tolerated    Diet: cardiac diet      Discharge Medications:     Current Discharge Medication List           Details   isosorbide mononitrate (IMDUR) 30 MG extended release tablet Take 0.5 tablets by mouth daily  Qty: 30 tablet, Refills: 3      insulin glargine (LANTUS SOLOSTAR) 100 UNIT/ML injection pen Inject 35 Units into the skin daily  Qty: 5 pen, Refills: 3      carvedilol (COREG) 3.125 MG tablet Take 1 tablet by mouth 2 times daily (with meals)  Qty: 60 tablet, Refills: 3      insulin lispro (HUMALOG) 100 UNIT/ML injection vial Inject 10 Units into the skin 3 times daily (with meals)  Qty: 10 mL, Refills: 0      torsemide (DEMADEX) 100 MG tablet Take 1 tablet by mouth daily  Qty: 30 tablet, Refills: 3      polyethylene glycol (GLYCOLAX) 17 g packet Take 17 g by mouth 2 times daily  Qty: 527 g, Refills: 1      linaclotide (LINZESS) 290 MCG CAPS capsule Take 1 capsule by mouth every morning (before breakfast)  Qty: 30 capsule, Refills: 1      tamsulosin (FLOMAX) 0.4 MG capsule Take 0.4 mg by mouth Daily      oxyCODONE-acetaminophen (PERCOCET) 5-325 MG per tablet Take 1 tablet by mouth 4 times daily for 30 days. Qty: 120 tablet, Refills: 0    Comments: Reduce doses taken as pain becomes manageable  Associated Diagnoses: Displacement of lumbar intervertebral disc without myelopathy      atorvastatin (LIPITOR) 80 MG tablet Take 1 tablet by mouth nightly  Qty: 30 tablet, Refills: 3      pregabalin (LYRICA) 25 MG capsule Take 1 capsule by mouth 2 times daily for 1 day.   Qty: 2 capsule, Refills: 0    Associated Diagnoses: Chronic pain syndrome      allopurinol (ZYLOPRIM) 300 MG tablet Take 1 tablet by mouth daily  Qty: 30 tablet, Refills: 0      naloxone 4 MG/0.1ML LIQD nasal spray 1 spray by Nasal route as needed for Opioid Reversal  Qty: 1 each, Refills: 0      acetaminophen (TYLENOL) 500 MG tablet Take 2 tablets by mouth 4 times daily as needed for Pain  Qty: 60 tablet, Refills: 0      senna (SENOKOT) 8.6 MG TABS tablet Take 1 tablet by mouth 2 times daily  Qty: 120 tablet, Refills: 0      docusate sodium (COLACE, DULCOLAX) 100 MG CAPS Take 100 mg by mouth 2 times daily      Compression Stockings MISC by Does not apply route 2 pair, knee high compression stockings, fitted/ zippered  Qty: 2 each, Refills: 1    Associated Diagnoses: Chronic systolic CHF (congestive heart failure) ([facility] Coronary artery disease involving native coronary artery of native heart without angina pectoris; Essential hypertension; CKD (chronic kidney disease) stage 3, GFR 30-59 ml/min ([facility] Type 2 diabetes mellitus with stage 3 chronic kidney disease, with long-term current use of insulin ([facility])      silver sulfADIAZINE (SILVADENE) 1 % cream Apply to BL lower leg ulcers daily  Qty: 20 g, Refills: 0      nitroGLYCERIN (NITROSTAT) 0.4 MG SL tablet Place 1 tablet under the tongue every 5 minutes as needed  Qty: 25 tablet, Refills: 1      fluticasone (FLONASE) 50 MCG/ACT nasal spray 1 spray by Nasal route nightly as needed. aspirin 81 MG chewable tablet Take 1 tablet by mouth daily. Qty: 30 tablet      ferrous sulfate 325 (65 FE) MG tablet Take 325 mg by mouth 2 times daily       omeprazole (PRILOSEC) 20 MG capsule Take 20 mg by mouth 2 times daily. Time Spent on discharge is more than 45 minutes in the examination, evaluation, counseling and review of medications and discharge plan. Signed:    Radha Ha MD   5/3/2022      Thank you PATRICK Mathur - GILBERTO for the opportunity to be involved in this patient's care. If you have any questions or concerns, please feel free to contact me at [phone].

## 2022-05-05 ENCOUNTER — TELEPHONE (OUTPATIENT)
Dept: CARDIOLOGY CLINIC | Age: 67
End: 2022-05-05

## 2022-05-05 NOTE — TELEPHONE ENCOUNTER
Ernesto [phone] contacted JEANNA Lawson requesting verbal orders from [facility] for skilled nursing PT/OT for pt. Please contact Ernesto and advise.

## 2022-05-06 ENCOUNTER — FOLLOWUP TELEPHONE ENCOUNTER (OUTPATIENT)
Dept: TELEMETRY | Age: 67
End: 2022-05-06

## 2022-05-06 NOTE — TELEPHONE ENCOUNTER
Writer spoke to friends for hospital follow up. They stated they are no longer going to be able to support him. They are attempting to assist him with placement in [facility] on Monday. They have an appt on Monday.  Chely Montenegro RN

## 2022-05-06 NOTE — TELEPHONE ENCOUNTER
1st Attempt; No Answer- no voicemail available.   Will attempt call again later today       Cody Burns RN

## 2022-05-13 DIAGNOSIS — I48.0 PAF (PAROXYSMAL ATRIAL FIBRILLATION) (HCC): ICD-10-CM

## 2022-05-25 PROBLEM — R79.89 ELEVATED TROPONIN: Status: RESOLVED | Noted: 2022-04-25 | Resolved: 2022-05-25

## 2022-05-25 PROBLEM — R77.8 ELEVATED TROPONIN: Status: RESOLVED | Noted: 2022-04-25 | Resolved: 2022-05-25

## 2022-05-26 ENCOUNTER — OFFICE VISIT (OUTPATIENT)
Dept: CARDIOLOGY CLINIC | Age: 67
End: 2022-05-26
Payer: MEDICARE

## 2022-05-26 VITALS
DIASTOLIC BLOOD PRESSURE: 58 MMHG | WEIGHT: 298 LBS | HEART RATE: 75 BPM | SYSTOLIC BLOOD PRESSURE: 134 MMHG | BODY MASS INDEX: 44.01 KG/M2 | OXYGEN SATURATION: 97 %

## 2022-05-26 DIAGNOSIS — I48.0 PAF (PAROXYSMAL ATRIAL FIBRILLATION) (HCC): ICD-10-CM

## 2022-05-26 DIAGNOSIS — I10 ESSENTIAL HYPERTENSION: ICD-10-CM

## 2022-05-26 DIAGNOSIS — I27.81 COR PULMONALE, CHRONIC (HCC): ICD-10-CM

## 2022-05-26 DIAGNOSIS — I27.22 PULMONARY HYPERTENSION DUE TO LEFT VENTRICULAR DIASTOLIC DYSFUNCTION (HCC): ICD-10-CM

## 2022-05-26 DIAGNOSIS — I50.32 CHRONIC DIASTOLIC HEART FAILURE (HCC): Primary | ICD-10-CM

## 2022-05-26 PROCEDURE — 1036F TOBACCO NON-USER: CPT | Performed by: NURSE PRACTITIONER

## 2022-05-26 PROCEDURE — 1123F ACP DISCUSS/DSCN MKR DOCD: CPT | Performed by: NURSE PRACTITIONER

## 2022-05-26 PROCEDURE — 1111F DSCHRG MED/CURRENT MED MERGE: CPT | Performed by: NURSE PRACTITIONER

## 2022-05-26 PROCEDURE — G8417 CALC BMI ABV UP PARAM F/U: HCPCS | Performed by: NURSE PRACTITIONER

## 2022-05-26 PROCEDURE — 99214 OFFICE O/P EST MOD 30 MIN: CPT | Performed by: NURSE PRACTITIONER

## 2022-05-26 PROCEDURE — G8427 DOCREV CUR MEDS BY ELIG CLIN: HCPCS | Performed by: NURSE PRACTITIONER

## 2022-05-26 PROCEDURE — 3017F COLORECTAL CA SCREEN DOC REV: CPT | Performed by: NURSE PRACTITIONER

## 2022-05-26 RX ORDER — METOLAZONE 5 MG/1
5 TABLET ORAL WEEKLY
Qty: 12 TABLET | Refills: 0 | Status: SHIPPED | OUTPATIENT
Start: 2022-05-26 | End: 2022-07-14

## 2022-05-26 RX ORDER — ISOSORBIDE MONONITRATE 30 MG/1
30 TABLET, EXTENDED RELEASE ORAL DAILY
Qty: 30 TABLET | Refills: 1
Start: 2022-05-26

## 2022-05-26 RX ORDER — NETARSUDIL 0.2 MG/ML
1 SOLUTION/ DROPS OPHTHALMIC; TOPICAL NIGHTLY
COMMUNITY
Start: 2022-05-19

## 2022-05-26 RX ORDER — BRIMONIDINE TARTRATE, TIMOLOL MALEATE 2; 5 MG/ML; MG/ML
1 SOLUTION/ DROPS OPHTHALMIC 2 TIMES DAILY
COMMUNITY
Start: 2022-05-20

## 2022-05-26 RX ORDER — DORZOLAMIDE HCL 20 MG/ML
1 SOLUTION/ DROPS OPHTHALMIC 2 TIMES DAILY
COMMUNITY
Start: 2022-05-21

## 2022-05-26 RX ORDER — ONDANSETRON 4 MG/1
4 TABLET, ORALLY DISINTEGRATING ORAL EVERY 6 HOURS PRN
COMMUNITY
Start: 2022-05-16

## 2022-05-26 NOTE — PATIENT INSTRUCTIONS
Plan:   1. Continue daily weights  2. Continue fluid restriction 1500ml a day  3. Add metolazone 5mg once a week on Fridays  4. Low sodium diet 2500mg a day   5. Repeat BMP and BNP in 2 weeks   6.  Follow up in 6 weeks

## 2022-05-26 NOTE — LETTER
Pancho Herrmann Kristian  1955    [facility]   Cardiac Follow-up    Primary Care Doctor:  Maryjane Zhou, APRN - CNP    Chief Complaint   Patient presents with    Follow-up    Congestive Heart Failure        History of Present Illness:   I had the pleasure of seeing Som [facility] in follow up for diastolic heart failure, HTN, CAD s/p CABG, pHTN. Has been admitted 5 times so far this year in 2022. His recent admission from 4/24/2022 through 5/3/2022, discharge summary reviewed  discharged to [address]. He had some rehab, no longer doing rehab. Caron Mireya He reports he doesn't like the diet. Breathing has been stable. Med list is incomplete from the nursing home at the time of the visit  He was discharged on torsemide 100mg daily. He is unsure of what he is taking but tells me he is taking 9-10 pills a day     Discharge weight was 287lbs. His weight increased back up after discharge and now back down now. He realizes that the sodium in the food is worse than the sugar. Continues with wound care in the lower legs. Pancho Herrmann Kristian describes symptoms including edema but denies chest pain, palpitations. Home weights: 291 lbs up to 298lbs today after eating [place] food ordered from the restaurant yesterday  Appetite: Good  Fluid intake: 1500ml fluid restriction at the nursing home. Diet: he reports that he gets some resturant food, that is ordered through the nursing home.    Will eat instant oatmal 2 packs every night       NYHA:   II  ACC/ AHA Stage:    C  Past Medical History:   has a past medical history of Anemia, Angina, Arthritis, CAD (coronary artery disease), CHF (congestive heart failure) ([address] CKD (chronic kidney disease) stage 3, GFR 30-59 ml/min ([facility]), Clostridium difficile diarrhea, Diabetes mellitus ([address] Diabetic neuropathy ([address] Disease of blood and blood forming organ, Dizziness, GERD (gastroesophageal reflux disease), Headache, Hearing loss, Hyperlipidemia, Hypertension, Kidney stone, Refusal of blood transfusions as patient is [employer], Sleep apnea, Tinnitus, Venous ulcer ([address] and Wound, open. Surgical History:   has a past surgical history that includes hernia repair (age1); Cholecystectomy (9/2011); other surgical history (11-16-11 REPAIR LOWER STERNAL INCISION, REMOVAL OF ONE STERNAL WIRE,); Upper gastrointestinal endoscopy; Cardiac surgery; other surgical history (10/10/2014); and Pain management procedure (N/A, 2/10/2022). Social History:   reports that he quit smoking about 34 years ago. His smoking use included cigarettes. He has a 16.00 pack-year smoking history. He has never used smokeless tobacco. He reports that he does not drink alcohol and does not use drugs. Family History:   Family History   Problem Relation Age of Onset    Heart Disease Mother     Heart Disease Father     Cancer Father     Diabetes Brother     Diabetes Brother        Home Medications:  Prior to Admission medications    Medication Sig Start Date End Date Taking?  Authorizing Provider   isosorbide mononitrate (IMDUR) 30 MG extended release tablet Take 0.5 tablets by mouth daily  Patient taking differently: Take 30 mg by mouth daily  4/10/22  Yes Colt Granda MD   insulin glargine (LANTUS SOLOSTAR) 100 UNIT/ML injection pen Inject 35 Units into the skin daily 4/9/22  Yes Colt Granda MD   carvedilol (COREG) 3.125 MG tablet Take 1 tablet by mouth 2 times daily (with meals) 4/9/22  Yes Colt Granda MD   insulin lispro (HUMALOG) 100 UNIT/ML injection vial Inject 10 Units into the skin 3 times daily (with meals) 4/9/22  Yes Colt Granda MD   torsemide [facility]) 100 MG tablet Take 1 tablet by mouth daily 4/10/22  Yes Colt Granda MD   linaclotide Iza Jessica) 290 MCG CAPS capsule Take 1 capsule by mouth every morning (before breakfast) 4/10/22  Yes Colt Granda MD   tamsulosin (FLOMAX) 0.4 MG capsule Take 0.4 mg by mouth Daily 12/30/21  Yes Historical Gabapentin, and Nsaids     Physical Examination:    Vitals:    05/26/22 1342   BP: (!) 134/58   Pulse: 75   SpO2: 97%   Weight: 298 lb (135.2 kg)        Constitutional and General Appearance: no apparent distress, obese  HEENT: non-icteric sclera, mask in place, disconjugate gaze  Neck: JVP difficult to assess   Respiratory:  · No use of accessory muscles  · Diminished breath sounds throughout, no wheezing, no crackles, no rhonchi  Cardiovascular:  · + 3/6 high pitched systolic murmur loudest at second right sternal border, no rub or gallop  · Regular rate and rhythm, S1,S2 distant  · Radial pulses 2+ and equal bilaterally  · +  2 BLE edema, bilateral lower extremities wrapped  · Pedal Pulses: 2+ and equal   Abdomen: obese  · No masses or tenderness  · Liver: No Abnormalities Noted  Musculoskeletal/Skin:  · Wheelchair   · There is no clubbing, cyanosis of the extremities  · Skin is warm and dry  · Moves all extremities   Neurological/Psychiatric:  · Alert and oriented in all spheres  · No abnormalities of mood, affect, memory, mentation, or behavior are noted    Lab Data:  CBC:   Lab Results   Component Value Date    WBC 6.9 04/24/2022    WBC 5.4 04/03/2022    WBC 6.0 04/02/2022    RBC 3.66 04/24/2022    RBC 3.85 04/03/2022    RBC 3.63 04/02/2022    HGB 10.8 04/30/2022    HGB 10.1 04/24/2022    HGB 10.9 04/03/2022    HCT 33.7 04/30/2022    HCT 31.9 04/24/2022    HCT 33.9 04/03/2022    MCV 87.2 04/24/2022    MCV 88.2 04/03/2022    MCV 87.7 04/02/2022    RDW 18.9 04/24/2022    RDW 20.4 04/03/2022    RDW 19.7 04/02/2022     04/24/2022     04/03/2022     04/02/2022     BMP:   Lab Results   Component Value Date     05/03/2022     05/02/2022     05/01/2022    K 3.5 05/03/2022    K 3.6 05/02/2022    K 3.8 05/01/2022    K 4.6 04/25/2022    K 4.7 04/24/2022    K 3.4 03/31/2022    CL 93 05/03/2022    CL 93 05/02/2022    CL 95 05/01/2022    CO2 35 05/03/2022    CO2 33 05/02/2022    CO2 30 05/01/2022    PHOS 3.4 05/03/2022    PHOS 3.7 05/02/2022    PHOS 3.6 05/01/2022    BUN 73 05/03/2022    BUN 76 05/02/2022    BUN 78 05/01/2022    CREATININE 1.5 05/03/2022    CREATININE 1.6 05/02/2022    CREATININE 1.6 05/01/2022     BNP:   Lab Results   Component Value Date    PROBNP 4,695 05/01/2022    PROBNP 4,316 04/28/2022    PROBNP 4,308 04/24/2022       Recent Testing:  Echo 9/2016  Left ventricular systolic function is normal with ejection fraction   estimated at 55 %. Diastolic septal flattening consistent with right ventricular overload. Diastolic filling parameters suggests grade II diastolic dysfunction. Mild aortic stenosis. Right ventricle is mildly enlarged. Right ventricular systolic function is normal, TAPSE 19 mm. Inadequate tricuspid regurgitation jet to estimate systolic pulmonary artery   pressure (SPAP).  Echocardiogram 1/13/2022   Summary   Limited only f/u for LVEF and aortic valve stenosis.   Technically difficult examination.   Low normal left ventricular systolic function with ejection fraction of 50%.   Moderate aortic stenosis.   Aortic stenosis values appear approximately same when compared to echo on 8-.   Mild to moderate aortic regurgitation. Assessment:   chronic diastolic heart failure  CAD s/p CABG x3  PAF; patient declines anticoagulation is willing to accept stroke risk per year  Moderate aortic stenosis  CKD - baseline creatinine 1.7   chronic hypoxemic respiratory failure  Severe HENNA  Diabetes  HLD  HTN  Anemia, patient is [employer]      1. Chronic diastolic heart failure ([address]    2. PAF (paroxysmal atrial fibrillation) ([address]    3. Cor pulmonale, chronic ([address]    4. Essential hypertension    5. Pulmonary hypertension due to left ventricular diastolic dysfunction (HCC)      Impression: Dietary indiscretions occurring at the nursing home, as he had take out yesterday and his weight is up from 291lbs to 298lbs.  He continues with edema of the BLE and weeping wounds. Reviewed labs from Nursing home shows stable creatinine 1.9. Plan:   1. Continue daily weights  2. Continue fluid restriction 1500ml a day  3. Add metolazone 5mg once a week on Fridays  4. Low sodium diet 2500mg a day   5. Repeat BMP and BNP in 2 weeks   6. Follow up in 6 weeks       I appreciate the opportunity for caring for this patient.      Vikash Cade, APRN - CNP, CNP, 5/26/2022, 4:25 PM

## 2022-05-26 NOTE — PROGRESS NOTES
[address]   Cardiac Follow-up    Primary Care Doctor:  PATRICK Ernandez - CNP    Chief Complaint   Patient presents with    Follow-up    Congestive Heart Failure        History of Present Illness:   I had the pleasure of seeing Suan Duverney in follow up for diastolic heart failure, HTN, CAD s/p CABG, pHTN. Has been admitted 5 times so far this year in 2022. His recent admission from 4/24/2022 through 5/3/2022, discharge summary reviewed  discharged to [address]. He had some rehab, no longer doing rehab. Khushbu Wilde He reports he doesn't like the diet. Breathing has been stable. Med list is incomplete from the nursing home at the time of the visit  He was discharged on torsemide 100mg daily. He is unsure of what he is taking but tells me he is taking 9-10 pills a day     Discharge weight was 287lbs. His weight increased back up after discharge and now back down now. He realizes that the sodium in the food is worse than the sugar. Continues with wound care in the lower legs. Rakesh Townsend describes symptoms including edema but denies chest pain, palpitations. Home weights: 291 lbs up to 298lbs today after eating [place] food ordered from the restaurant yesterday  Appetite: Good  Fluid intake: 1500ml fluid restriction at the nursing home. Diet: he reports that he gets some resturant food, that is ordered through the nursing home.    Will eat instant oatmal 2 packs every night       NYHA:   II  ACC/ AHA Stage:    C  Past Medical History:   has a past medical history of Anemia, Angina, Arthritis, CAD (coronary artery disease), CHF (congestive heart failure) ([facility] CKD (chronic kidney disease) stage 3, GFR 30-59 ml/min ([facility]), Clostridium difficile diarrhea, Diabetes mellitus ([address] Diabetic neuropathy ([facility] Disease of blood and blood forming organ, Dizziness, GERD (gastroesophageal reflux disease), Headache, Hearing loss, Hyperlipidemia, Hypertension, Kidney stone, Refusal of blood transfusions as patient is Denominational, Sleep apnea, Tinnitus, Venous ulcer ([address] and Wound, open. Surgical History:   has a past surgical history that includes hernia repair (age1); Cholecystectomy (9/2011); other surgical history (11-16-11 REPAIR LOWER STERNAL INCISION, REMOVAL OF ONE STERNAL WIRE,); Upper gastrointestinal endoscopy; Cardiac surgery; other surgical history (10/10/2014); and Pain management procedure (N/A, 2/10/2022). Social History:   reports that he quit smoking about 34 years ago. His smoking use included cigarettes. He has a 16.00 pack-year smoking history. He has never used smokeless tobacco. He reports that he does not drink alcohol and does not use drugs. Family History:   Family History   Problem Relation Age of Onset    Heart Disease Mother     Heart Disease Father     Cancer Father     Diabetes Brother     Diabetes Brother        Home Medications:  Prior to Admission medications    Medication Sig Start Date End Date Taking?  Authorizing Provider   isosorbide mononitrate (IMDUR) 30 MG extended release tablet Take 0.5 tablets by mouth daily  Patient taking differently: Take 30 mg by mouth daily  4/10/22  Yes Maggy David MD   insulin glargine (LANTUS SOLOSTAR) 100 UNIT/ML injection pen Inject 35 Units into the skin daily 4/9/22  Yes Maggy David MD   carvedilol (COREG) 3.125 MG tablet Take 1 tablet by mouth 2 times daily (with meals) 4/9/22  Yes Maggy David MD   insulin lispro (HUMALOG) 100 UNIT/ML injection vial Inject 10 Units into the skin 3 times daily (with meals) 4/9/22  Yes Maggy David MD   torsemide [facility]) 100 MG tablet Take 1 tablet by mouth daily 4/10/22  Yes Maggy David MD   linaclotide [facility]) 290 MCG CAPS capsule Take 1 capsule by mouth every morning (before breakfast) 4/10/22  Yes Maggy David MD   tamsulosin (FLOMAX) 0.4 MG capsule Take 0.4 mg by mouth Daily 12/30/21  Yes Historical Provider, MD oxyCODONE-acetaminophen (PERCOCET) 5-325 MG per tablet Take 1 tablet by mouth 4 times daily for 30 days. 4/26/22 5/26/22 Yes Harvey Locke MD   atorvastatin (LIPITOR) 80 MG tablet Take 1 tablet by mouth nightly 3/14/22  Yes PATRICK Harris CNP   allopurinol (ZYLOPRIM) 300 MG tablet Take 1 tablet by mouth daily 3/15/22  Yes Jodie Hastings MD   naloxone 4 MG/0.1ML LIQD nasal spray 1 spray by Nasal route as needed for Opioid Reversal 11/23/21  Yes Harvey Locke MD   acetaminophen (TYLENOL) 500 MG tablet Take 2 tablets by mouth 4 times daily as needed for Pain 9/17/20  Yes PATRICK Burnett CNP   senna (SENOKOT) 8.6 MG TABS tablet Take 1 tablet by mouth 2 times daily 7/8/19  Yes PATRICK Medeiros CNP   docusate sodium (COLACE, DULCOLAX) 100 MG CAPS Take 100 mg by mouth 2 times daily 5/1/18  Yes PATRICK Gabriel - CNP   silver sulfADIAZINE (SILVADENE) 1 % cream Apply to BL lower leg ulcers daily 2/9/16  Yes Natacha Zambrano MD   nitroGLYCERIN (NITROSTAT) 0.4 MG SL tablet Place 1 tablet under the tongue every 5 minutes as needed 8/17/15  Yes PATRICK Ortiz - CNP   fluticasone (FLONASE) 50 MCG/ACT nasal spray 1 spray by Nasal route nightly as needed. Yes Historical Provider, MD   aspirin 81 MG chewable tablet Take 1 tablet by mouth daily. 1/11/13  Yes Torsten Vieira MD   ferrous sulfate 325 (65 FE) MG tablet Take 325 mg by mouth 2 times daily  12/22/10  Yes Historical Provider, MD   omeprazole (PRILOSEC) 20 MG capsule Take 20 mg by mouth 2 times daily. 12/22/10  Yes Historical Provider, MD   pregabalin (LYRICA) 25 MG capsule Take 1 capsule by mouth 2 times daily for 1 day.  3/14/22 4/20/22  Jodie Hastings MD   Compression Stockings MISC by Does not apply route 2 pair, knee high compression stockings, fitted/ zippered 6/15/16   May Nitza, APRN - CNP        Allergies:  Amitriptyline, Celecoxib, Cymbalta [duloxetine hcl], Elavil [amitriptyline hcl], Gabapentin, and Nsaids     Physical Examination:    Vitals:    05/26/22 1342   BP: (!) 134/58   Pulse: 75   SpO2: 97%   Weight: 298 lb (135.2 kg)        Constitutional and General Appearance: no apparent distress, obese  HEENT: non-icteric sclera, mask in place, disconjugate gaze  Neck: JVP difficult to assess   Respiratory:  · No use of accessory muscles  · Diminished breath sounds throughout, no wheezing, no crackles, no rhonchi  Cardiovascular:  · + 3/6 high pitched systolic murmur loudest at second right sternal border, no rub or gallop  · Regular rate and rhythm, S1,S2 distant  · Radial pulses 2+ and equal bilaterally  · +  2 BLE edema, bilateral lower extremities wrapped  · Pedal Pulses: 2+ and equal   Abdomen: obese  · No masses or tenderness  · Liver: No Abnormalities Noted  Musculoskeletal/Skin:  · Wheelchair   · There is no clubbing, cyanosis of the extremities  · Skin is warm and dry  · Moves all extremities   Neurological/Psychiatric:  · Alert and oriented in all spheres  · No abnormalities of mood, affect, memory, mentation, or behavior are noted    Lab Data:  CBC:   Lab Results   Component Value Date    WBC 6.9 04/24/2022    WBC 5.4 04/03/2022    WBC 6.0 04/02/2022    RBC 3.66 04/24/2022    RBC 3.85 04/03/2022    RBC 3.63 04/02/2022    HGB 10.8 04/30/2022    HGB 10.1 04/24/2022    HGB 10.9 04/03/2022    HCT 33.7 04/30/2022    HCT 31.9 04/24/2022    HCT 33.9 04/03/2022    MCV 87.2 04/24/2022    MCV 88.2 04/03/2022    MCV 87.7 04/02/2022    RDW 18.9 04/24/2022    RDW 20.4 04/03/2022    RDW 19.7 04/02/2022     04/24/2022     04/03/2022     04/02/2022     BMP:   Lab Results   Component Value Date     05/03/2022     05/02/2022     05/01/2022    K 3.5 05/03/2022    K 3.6 05/02/2022    K 3.8 05/01/2022    K 4.6 04/25/2022    K 4.7 04/24/2022    K 3.4 03/31/2022    CL 93 05/03/2022    CL 93 05/02/2022    CL 95 05/01/2022    CO2 35 05/03/2022    CO2 33 05/02/2022    CO2 30 05/01/2022 PHOS 3.4 05/03/2022    PHOS 3.7 05/02/2022    PHOS 3.6 05/01/2022    BUN 73 05/03/2022    BUN 76 05/02/2022    BUN 78 05/01/2022    CREATININE 1.5 05/03/2022    CREATININE 1.6 05/02/2022    CREATININE 1.6 05/01/2022     BNP:   Lab Results   Component Value Date    PROBNP 4,695 05/01/2022    PROBNP 4,316 04/28/2022    PROBNP 4,308 04/24/2022       Recent Testing:  Echo 9/2016  Left ventricular systolic function is normal with ejection fraction   estimated at 55 %. Diastolic septal flattening consistent with right ventricular overload. Diastolic filling parameters suggests grade II diastolic dysfunction. Mild aortic stenosis. Right ventricle is mildly enlarged. Right ventricular systolic function is normal, TAPSE 19 mm. Inadequate tricuspid regurgitation jet to estimate systolic pulmonary artery   pressure (SPAP).  Echocardiogram 1/13/2022   Summary   Limited only f/u for LVEF and aortic valve stenosis.   Technically difficult examination.   Low normal left ventricular systolic function with ejection fraction of 50%.   Moderate aortic stenosis.   Aortic stenosis values appear approximately same when compared to echo on 8-.   Mild to moderate aortic regurgitation. Assessment:   chronic diastolic heart failure  CAD s/p CABG x3  PAF; patient declines anticoagulation is willing to accept stroke risk per year  Moderate aortic stenosis  CKD - baseline creatinine 1.7   chronic hypoxemic respiratory failure  Severe HENNA  Diabetes  HLD  HTN  Anemia, patient is Oriental orthodox      1. Chronic diastolic heart failure ([address]    2. PAF (paroxysmal atrial fibrillation) ([address]    3. Cor pulmonale, chronic ([address]    4. Essential hypertension    5. Pulmonary hypertension due to left ventricular diastolic dysfunction (HCC)      Impression: Dietary indiscretions occurring at the nursing home, as he had take out yesterday and his weight is up from 291lbs to 298lbs.  He continues with edema of the BLE and weeping wounds. Reviewed labs from Nursing home shows stable creatinine 1.9. Plan:   1. Continue daily weights  2. Continue fluid restriction 1500ml a day  3. Add metolazone 5mg once a week on Fridays  4. Low sodium diet 2500mg a day   5. Repeat BMP and BNP in 2 weeks   6. Follow up in 6 weeks       I appreciate the opportunity for caring for this patient.      PATRICK Garcia - CNP, CNP, 5/26/2022, 4:25 PM

## 2022-05-28 ENCOUNTER — APPOINTMENT (OUTPATIENT)
Dept: CT IMAGING | Age: 67
End: 2022-05-28
Payer: MEDICARE

## 2022-05-28 ENCOUNTER — HOSPITAL ENCOUNTER (EMERGENCY)
Age: 67
Discharge: ANOTHER HOSPITAL | End: 2022-05-29
Attending: EMERGENCY MEDICINE
Payer: MEDICARE
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
## 2022-05-28 DIAGNOSIS — S06.5XAA SUBDURAL HEMATOMA: Primary | ICD-10-CM

## 2022-05-28 LAB
A/G RATIO: 1.1 (ref 1.1–2.2)
ALBUMIN SERPL-MCNC: 3.9 G/DL (ref 3.4–5)
ALP BLD-CCNC: 102 U/L (ref 40–129)
ALT SERPL-CCNC: 7 U/L (ref 10–40)
ANION GAP SERPL CALCULATED.3IONS-SCNC: 12 MMOL/L (ref 3–16)
AST SERPL-CCNC: 14 U/L (ref 15–37)
BASOPHILS ABSOLUTE: 0 K/UL (ref 0–0.2)
BASOPHILS RELATIVE PERCENT: 0.4 %
BILIRUB SERPL-MCNC: 0.5 MG/DL (ref 0–1)
BUN BLDV-MCNC: 82 MG/DL (ref 7–20)
CALCIUM SERPL-MCNC: 9.6 MG/DL (ref 8.3–10.6)
CHLORIDE BLD-SCNC: 94 MMOL/L (ref 99–110)
CO2: 30 MMOL/L (ref 21–32)
CREAT SERPL-MCNC: 1.8 MG/DL (ref 0.8–1.3)
EOSINOPHILS ABSOLUTE: 0.2 K/UL (ref 0–0.6)
EOSINOPHILS RELATIVE PERCENT: 2.4 %
GFR AFRICAN AMERICAN: 46
GFR NON-AFRICAN AMERICAN: 38
GLUCOSE BLD-MCNC: 246 MG/DL (ref 70–99)
HCT VFR BLD CALC: 33.8 % (ref 40.5–52.5)
HEMOGLOBIN: 10.8 G/DL (ref 13.5–17.5)
LYMPHOCYTES ABSOLUTE: 0.4 K/UL (ref 1–5.1)
LYMPHOCYTES RELATIVE PERCENT: 6.4 %
MCH RBC QN AUTO: 26.6 PG (ref 26–34)
MCHC RBC AUTO-ENTMCNC: 32 G/DL (ref 31–36)
MCV RBC AUTO: 83.1 FL (ref 80–100)
MONOCYTES ABSOLUTE: 0.3 K/UL (ref 0–1.3)
MONOCYTES RELATIVE PERCENT: 4.4 %
NEUTROPHILS ABSOLUTE: 6 K/UL (ref 1.7–7.7)
NEUTROPHILS RELATIVE PERCENT: 86.4 %
PDW BLD-RTO: 18 % (ref 12.4–15.4)
PLATELET # BLD: 181 K/UL (ref 135–450)
PMV BLD AUTO: 9.4 FL (ref 5–10.5)
POTASSIUM REFLEX MAGNESIUM: 3.8 MMOL/L (ref 3.5–5.1)
RBC # BLD: 4.06 M/UL (ref 4.2–5.9)
SODIUM BLD-SCNC: 136 MMOL/L (ref 136–145)
TOTAL PROTEIN: 7.4 G/DL (ref 6.4–8.2)
WBC # BLD: 6.9 K/UL (ref 4–11)

## 2022-05-28 PROCEDURE — 99285 EMERGENCY DEPT VISIT HI MDM: CPT

## 2022-05-28 PROCEDURE — 85025 COMPLETE CBC W/AUTO DIFF WBC: CPT

## 2022-05-28 PROCEDURE — 80053 COMPREHEN METABOLIC PANEL: CPT

## 2022-05-28 PROCEDURE — 36415 COLL VENOUS BLD VENIPUNCTURE: CPT

## 2022-05-28 PROCEDURE — 70450 CT HEAD/BRAIN W/O DYE: CPT

## 2022-05-28 PROCEDURE — 72125 CT NECK SPINE W/O DYE: CPT

## 2022-05-28 ASSESSMENT — PAIN DESCRIPTION - ORIENTATION: ORIENTATION: RIGHT

## 2022-05-28 ASSESSMENT — PAIN DESCRIPTION - FREQUENCY: FREQUENCY: CONTINUOUS

## 2022-05-28 ASSESSMENT — PAIN DESCRIPTION - LOCATION: LOCATION: RIB CAGE

## 2022-05-28 ASSESSMENT — PAIN SCALES - GENERAL: PAINLEVEL_OUTOF10: 9

## 2022-05-28 ASSESSMENT — PAIN - FUNCTIONAL ASSESSMENT: PAIN_FUNCTIONAL_ASSESSMENT: 0-10

## 2022-05-29 ENCOUNTER — APPOINTMENT (OUTPATIENT)
Dept: CT IMAGING | Age: 67
End: 2022-05-29
Attending: INTERNAL MEDICINE
Payer: MEDICARE

## 2022-05-29 ENCOUNTER — HOSPITAL ENCOUNTER (OUTPATIENT)
Age: 67
Setting detail: OBSERVATION
Discharge: NURSING, REHAB OR LONG-TERM CARE | End: 2022-05-30
Attending: INTERNAL MEDICINE | Admitting: INTERNAL MEDICINE
Payer: MEDICARE

## 2022-05-29 VITALS
BODY MASS INDEX: 43.99 KG/M2 | HEART RATE: 79 BPM | TEMPERATURE: 98.4 F | HEIGHT: 69 IN | WEIGHT: 297 LBS | SYSTOLIC BLOOD PRESSURE: 139 MMHG | OXYGEN SATURATION: 93 % | RESPIRATION RATE: 18 BRPM | DIASTOLIC BLOOD PRESSURE: 64 MMHG

## 2022-05-29 DIAGNOSIS — G89.4 CHRONIC PAIN SYNDROME: ICD-10-CM

## 2022-05-29 PROBLEM — S06.5XAA SUBDURAL HEMATOMA (HCC): Status: ACTIVE | Noted: 2022-05-29

## 2022-05-29 PROBLEM — J96.11 CHRONIC RESPIRATORY FAILURE WITH HYPOXIA (HCC): Status: ACTIVE | Noted: 2022-05-29

## 2022-05-29 PROBLEM — S06.5XAA SDH (SUBDURAL HEMATOMA): Status: ACTIVE | Noted: 2022-05-29

## 2022-05-29 LAB
GLUCOSE BLD-MCNC: 232 MG/DL (ref 70–99)
GLUCOSE BLD-MCNC: 286 MG/DL (ref 70–99)
GLUCOSE BLD-MCNC: 304 MG/DL (ref 70–99)
PERFORMED ON: ABNORMAL

## 2022-05-29 PROCEDURE — 97535 SELF CARE MNGMENT TRAINING: CPT

## 2022-05-29 PROCEDURE — 94761 N-INVAS EAR/PLS OXIMETRY MLT: CPT

## 2022-05-29 PROCEDURE — 6370000000 HC RX 637 (ALT 250 FOR IP): Performed by: INTERNAL MEDICINE

## 2022-05-29 PROCEDURE — 70450 CT HEAD/BRAIN W/O DYE: CPT

## 2022-05-29 PROCEDURE — 97530 THERAPEUTIC ACTIVITIES: CPT

## 2022-05-29 PROCEDURE — 97166 OT EVAL MOD COMPLEX 45 MIN: CPT

## 2022-05-29 PROCEDURE — G0378 HOSPITAL OBSERVATION PER HR: HCPCS

## 2022-05-29 PROCEDURE — 97116 GAIT TRAINING THERAPY: CPT

## 2022-05-29 PROCEDURE — G0379 DIRECT REFER HOSPITAL OBSERV: HCPCS

## 2022-05-29 PROCEDURE — 2700000000 HC OXYGEN THERAPY PER DAY

## 2022-05-29 PROCEDURE — 97162 PT EVAL MOD COMPLEX 30 MIN: CPT

## 2022-05-29 PROCEDURE — 6370000000 HC RX 637 (ALT 250 FOR IP): Performed by: EMERGENCY MEDICINE

## 2022-05-29 PROCEDURE — 92610 EVALUATE SWALLOWING FUNCTION: CPT

## 2022-05-29 RX ORDER — ACETAMINOPHEN 325 MG/1
650 TABLET ORAL EVERY 4 HOURS PRN
Status: DISCONTINUED | OUTPATIENT
Start: 2022-05-29 | End: 2022-05-30 | Stop reason: HOSPADM

## 2022-05-29 RX ORDER — CARVEDILOL 3.12 MG/1
3.12 TABLET ORAL 2 TIMES DAILY WITH MEALS
Status: DISCONTINUED | OUTPATIENT
Start: 2022-05-29 | End: 2022-05-30 | Stop reason: HOSPADM

## 2022-05-29 RX ORDER — ACETAMINOPHEN 500 MG
1000 TABLET ORAL ONCE
Status: DISCONTINUED | OUTPATIENT
Start: 2022-05-29 | End: 2022-05-29 | Stop reason: HOSPADM

## 2022-05-29 RX ORDER — INSULIN LISPRO 100 [IU]/ML
0-3 INJECTION, SOLUTION INTRAVENOUS; SUBCUTANEOUS NIGHTLY
Status: DISCONTINUED | OUTPATIENT
Start: 2022-05-29 | End: 2022-05-30 | Stop reason: HOSPADM

## 2022-05-29 RX ORDER — OXYCODONE HYDROCHLORIDE AND ACETAMINOPHEN 5; 325 MG/1; MG/1
1 TABLET ORAL EVERY 6 HOURS PRN
Qty: 12 TABLET | Refills: 0 | Status: SHIPPED | OUTPATIENT
Start: 2022-05-29 | End: 2022-06-01

## 2022-05-29 RX ORDER — INSULIN LISPRO 100 [IU]/ML
0-6 INJECTION, SOLUTION INTRAVENOUS; SUBCUTANEOUS
Status: DISCONTINUED | OUTPATIENT
Start: 2022-05-29 | End: 2022-05-30 | Stop reason: HOSPADM

## 2022-05-29 RX ORDER — ONDANSETRON 4 MG/1
4 TABLET, ORALLY DISINTEGRATING ORAL EVERY 8 HOURS PRN
Status: DISCONTINUED | OUTPATIENT
Start: 2022-05-29 | End: 2022-05-30 | Stop reason: HOSPADM

## 2022-05-29 RX ORDER — ONDANSETRON 2 MG/ML
4 INJECTION INTRAMUSCULAR; INTRAVENOUS EVERY 6 HOURS PRN
Status: DISCONTINUED | OUTPATIENT
Start: 2022-05-29 | End: 2022-05-30 | Stop reason: HOSPADM

## 2022-05-29 RX ORDER — ATORVASTATIN CALCIUM 80 MG/1
80 TABLET, FILM COATED ORAL NIGHTLY
Status: DISCONTINUED | OUTPATIENT
Start: 2022-05-29 | End: 2022-05-30 | Stop reason: HOSPADM

## 2022-05-29 RX ORDER — PREGABALIN 25 MG/1
25 CAPSULE ORAL ONCE
Status: DISCONTINUED | OUTPATIENT
Start: 2022-05-29 | End: 2022-05-29 | Stop reason: HOSPADM

## 2022-05-29 RX ORDER — DOCUSATE SODIUM 100 MG/1
100 CAPSULE, LIQUID FILLED ORAL 2 TIMES DAILY
Status: DISCONTINUED | OUTPATIENT
Start: 2022-05-29 | End: 2022-05-30 | Stop reason: HOSPADM

## 2022-05-29 RX ORDER — PANTOPRAZOLE SODIUM 40 MG/1
40 TABLET, DELAYED RELEASE ORAL
Status: DISCONTINUED | OUTPATIENT
Start: 2022-05-29 | End: 2022-05-30 | Stop reason: HOSPADM

## 2022-05-29 RX ORDER — NIMODIPINE 30 MG/1
60 CAPSULE, LIQUID FILLED ORAL
Status: DISCONTINUED | OUTPATIENT
Start: 2022-05-29 | End: 2022-05-30 | Stop reason: HOSPADM

## 2022-05-29 RX ORDER — OXYCODONE HYDROCHLORIDE AND ACETAMINOPHEN 5; 325 MG/1; MG/1
1 TABLET ORAL ONCE
Status: COMPLETED | OUTPATIENT
Start: 2022-05-29 | End: 2022-05-29

## 2022-05-29 RX ORDER — ISOSORBIDE MONONITRATE 30 MG/1
30 TABLET, EXTENDED RELEASE ORAL DAILY
Status: DISCONTINUED | OUTPATIENT
Start: 2022-05-29 | End: 2022-05-30 | Stop reason: HOSPADM

## 2022-05-29 RX ORDER — PREGABALIN 25 MG/1
25 CAPSULE ORAL ONCE
Status: DISCONTINUED | OUTPATIENT
Start: 2022-05-29 | End: 2022-05-30 | Stop reason: SDUPTHER

## 2022-05-29 RX ORDER — PREGABALIN 25 MG/1
25 CAPSULE ORAL ONCE
Status: COMPLETED | OUTPATIENT
Start: 2022-05-29 | End: 2022-05-29

## 2022-05-29 RX ORDER — PREGABALIN 25 MG/1
25 CAPSULE ORAL 2 TIMES DAILY
Qty: 6 CAPSULE | Refills: 0 | Status: ON HOLD | OUTPATIENT
Start: 2022-05-29 | End: 2022-10-23 | Stop reason: CLARIF

## 2022-05-29 RX ORDER — OXYCODONE HYDROCHLORIDE AND ACETAMINOPHEN 5; 325 MG/1; MG/1
2 TABLET ORAL EVERY 6 HOURS PRN
Status: DISCONTINUED | OUTPATIENT
Start: 2022-05-29 | End: 2022-05-30 | Stop reason: HOSPADM

## 2022-05-29 RX ORDER — OXYCODONE HYDROCHLORIDE AND ACETAMINOPHEN 5; 325 MG/1; MG/1
1 TABLET ORAL EVERY 6 HOURS PRN
Status: DISCONTINUED | OUTPATIENT
Start: 2022-05-29 | End: 2022-05-30 | Stop reason: HOSPADM

## 2022-05-29 RX ORDER — DEXTROSE MONOHYDRATE 50 MG/ML
100 INJECTION, SOLUTION INTRAVENOUS PRN
Status: DISCONTINUED | OUTPATIENT
Start: 2022-05-29 | End: 2022-05-30 | Stop reason: HOSPADM

## 2022-05-29 RX ADMIN — ATORVASTATIN CALCIUM 80 MG: 80 TABLET, FILM COATED ORAL at 21:00

## 2022-05-29 RX ADMIN — PREGABALIN 25 MG: 25 CAPSULE ORAL at 06:43

## 2022-05-29 RX ADMIN — OXYCODONE HYDROCHLORIDE AND ACETAMINOPHEN 1 TABLET: 5; 325 TABLET ORAL at 01:51

## 2022-05-29 RX ADMIN — ACETAMINOPHEN 650 MG: 325 TABLET ORAL at 09:44

## 2022-05-29 RX ADMIN — NIMODIPINE 60 MG: 30 CAPSULE, LIQUID FILLED ORAL at 20:11

## 2022-05-29 RX ADMIN — NIMODIPINE 60 MG: 30 CAPSULE, LIQUID FILLED ORAL at 15:22

## 2022-05-29 RX ADMIN — NIMODIPINE 60 MG: 30 CAPSULE, LIQUID FILLED ORAL at 08:50

## 2022-05-29 RX ADMIN — DOCUSATE SODIUM 100 MG: 100 CAPSULE, LIQUID FILLED ORAL at 21:00

## 2022-05-29 RX ADMIN — INSULIN LISPRO 2 UNITS: 100 INJECTION, SOLUTION INTRAVENOUS; SUBCUTANEOUS at 10:34

## 2022-05-29 RX ADMIN — PANTOPRAZOLE SODIUM 40 MG: 40 TABLET, DELAYED RELEASE ORAL at 06:12

## 2022-05-29 RX ADMIN — OXYCODONE AND ACETAMINOPHEN 2 TABLET: 5; 325 TABLET ORAL at 14:13

## 2022-05-29 RX ADMIN — INSULIN LISPRO 2 UNITS: 100 INJECTION, SOLUTION INTRAVENOUS; SUBCUTANEOUS at 21:01

## 2022-05-29 RX ADMIN — CARVEDILOL 3.12 MG: 3.12 TABLET, FILM COATED ORAL at 08:50

## 2022-05-29 RX ADMIN — ISOSORBIDE MONONITRATE 30 MG: 30 TABLET, EXTENDED RELEASE ORAL at 08:50

## 2022-05-29 RX ADMIN — CARVEDILOL 3.12 MG: 3.12 TABLET, FILM COATED ORAL at 18:17

## 2022-05-29 RX ADMIN — OXYCODONE AND ACETAMINOPHEN 2 TABLET: 5; 325 TABLET ORAL at 22:10

## 2022-05-29 RX ADMIN — INSULIN GLARGINE 20 UNITS: 100 INJECTION, SOLUTION SUBCUTANEOUS at 22:14

## 2022-05-29 RX ADMIN — DOCUSATE SODIUM 100 MG: 100 CAPSULE, LIQUID FILLED ORAL at 08:50

## 2022-05-29 RX ADMIN — INSULIN LISPRO 4 UNITS: 100 INJECTION, SOLUTION INTRAVENOUS; SUBCUTANEOUS at 17:01

## 2022-05-29 ASSESSMENT — PAIN SCALES - GENERAL
PAINLEVEL_OUTOF10: 9
PAINLEVEL_OUTOF10: 9
PAINLEVEL_OUTOF10: 3
PAINLEVEL_OUTOF10: 10
PAINLEVEL_OUTOF10: 9
PAINLEVEL_OUTOF10: 0
PAINLEVEL_OUTOF10: 8
PAINLEVEL_OUTOF10: 5
PAINLEVEL_OUTOF10: 10

## 2022-05-29 ASSESSMENT — PAIN DESCRIPTION - DESCRIPTORS
DESCRIPTORS: ACHING;SHARP
DESCRIPTORS: SHARP;ACHING
DESCRIPTORS: ACHING;SHARP

## 2022-05-29 ASSESSMENT — PAIN - FUNCTIONAL ASSESSMENT: PAIN_FUNCTIONAL_ASSESSMENT: PREVENTS OR INTERFERES SOME ACTIVE ACTIVITIES AND ADLS

## 2022-05-29 ASSESSMENT — PAIN SCALES - WONG BAKER
WONGBAKER_NUMERICALRESPONSE: 8
WONGBAKER_NUMERICALRESPONSE: 2

## 2022-05-29 ASSESSMENT — PAIN DESCRIPTION - LOCATION
LOCATION: RIB CAGE
LOCATION: LEG
LOCATION: RIB CAGE
LOCATION: LEG

## 2022-05-29 ASSESSMENT — PAIN DESCRIPTION - ORIENTATION
ORIENTATION: RIGHT
ORIENTATION: RIGHT;LEFT
ORIENTATION: LEFT;RIGHT;DISTAL
ORIENTATION: RIGHT

## 2022-05-29 NOTE — ED NOTES
Call to Alvaro Padilla RN with [facility] to update on pt. Status.      Ace Solis RN  05/29/22 0026

## 2022-05-29 NOTE — DISCHARGE INSTR - COC
Continuity of Care Form    Patient Name: Faith Roman   :  1955  MRN:  [id number]    Admit date:  2022  Discharge date:  22    Code Status Order: DNR-CCA   Advance Directives:      Admitting Physician:  Lakia Duarte MD  PCP: No primary care provider on file. Discharging Nurse: [facility]/Room#: 7429/4380-56  Discharging Unit Phone Number: ***    Emergency Contact:   Extended Emergency Contact Information  Primary Emergency Contact: Mariia Kamara 6   [address] Phone: [phone]  Mobile Phone: [phone]  Relation: Other   needed?  No  Secondary Emergency Contact: Nesha Francisco  [place] Phone: [phone]  Mobile Phone: [phone]  Relation: Other    Past Surgical History:  Past Surgical History:   Procedure Laterality Date    CARDIAC SURGERY      Dec 27 2010, triple bypass    CHOLECYSTECTOMY  2011    HERNIA REPAIR  age1    OTHER SURGICAL HISTORY  10 REPAIR LOWER STERNAL INCISION, REMOVAL OF ONE STERNAL WIRE,    OTHER SURGICAL HISTORY  10/10/2014    phacoemulsification of cataract with intraocular lens implant left eye    PAIN MANAGEMENT PROCEDURE N/A 2/10/2022    MIDLINE CERVICAL SIX SEVEN EPIDURAL STEROID INJECTION SITE CONFIRMED BY FLUOROSCOPY performed by Shaneka Howard MD at [address] ENDOSCOPY         Immunization History:   Immunization History   Administered Date(s) Administered    COVID-19, Pfizer Purple top, DILUTE for use, 12+ yrs, 30mcg/0.3mL dose 2021, 2021, 2021    Influenza Virus Vaccine 2012, 10/02/2017, 10/01/2018, 2019, 2020    Influenza, High Dose (Fluzone 65 yrs and older) 10/01/2019    Influenza, MDCK Quadv, IM, PF (Flucelvax 2 yrs and older) 10/05/2018    Influenza, Quadv, IM, PF (6 mo and older Fluzone, Flulaval, Fluarix, and 3 yrs and older Afluria) 2016, 10/02/2017, 2020    Influenza, Quadv, adjuvanted, 65 yrs +, IM, PF (Fluad) 09/29/2020, 12/01/2021    Influenza, Triv, inactivated, subunit, adjuvanted, IM (Fluad 65 yrs and older) 09/13/2019    Pneumococcal Conjugate 13-valent ([place]) 09/13/2019    Pneumococcal Polysaccharide (Fiupvfexd27) 01/04/2011, 11/27/2014, 12/17/2020       Active Problems:  Patient Active Problem List   Diagnosis Code    DM (diabetes mellitus) ([facility] E11.9    Coronary artery disease involving native coronary artery of native heart without angina pectoris I25.10    Anemia of chronic disease D63.8    Essential hypertension I10    Hyperkalemia E87.5    Chronic diastolic heart failure (HCC) I50.32    Pulmonary hypertension due to left ventricular diastolic dysfunction ([facility]) I27.22    Morbid obesity ([facility]) E66.01    S/P CABG x 3 Z95.1    DM2 (diabetes mellitus, type 2) ([facility] E11.9    Morbid obesity with BMI of 50.0-59.9, adult ([facility] E66.01, Z68.43    HLD (hyperlipidemia) E78.5    Cardiomyopathy ([facility] I42.9    Productive cough R05.8    Chronic pain G89.29    Severe obstructive sleep apnea G47.33    Obesity, Class III, BMI 40-49.9 (morbid obesity) ([facility]) K61.56    Acute diastolic congestive heart failure ([facility]) I50.31    Degeneration of lumbar or lumbosacral intervertebral disc M51.37    Displacement of lumbar intervertebral disc without myelopathy M51.26    Lumbosacral spondylosis without myelopathy M47.817    Lumbar facet arthropathy M47.816    Disc displacement, lumbar M51.26    Spinal stenosis of lumbar region M48.061    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    Tympanic membrane perforation, left H72.92    Chest pain R07.9    Cor pulmonale, chronic ([facility]) I27.81    Pulmonary hypertension due to alveolar hypoventilation disorder ([facility]) I27.23    Nonrheumatic aortic valve stenosis I35.0    Chronic neck pain M54.2, G89.29    Dyspnea S68.56    Acute diastolic CHF (congestive heart failure) ([facility]) I50.31    Cervical stenosis of spinal canal M48.02    Degeneration of cervical intervertebral disc M50.30    Cervical radiculitis M54.12    Acute on chronic diastolic CHF (congestive heart failure) ([facility]) I50.33    Acute respiratory failure with hypoxia ([facility]) J96.01    CKD (chronic kidney disease) N18.9    PAF (paroxysmal atrial fibrillation) ([facility]) E69.3    Acute diastolic HF (heart failure) ([facility]) I50.31    Subdural hematoma ([facility]) S06.5X9A    SDH (subdural hematoma) ([facility] S06.5X9A       Isolation/Infection:   Isolation            No Isolation          Patient Infection Status       Infection Onset Added Last Indicated Last Indicated By Review Planned Expiration Resolved Resolved By    None active    Resolved    COVID-19 (Rule Out) 03/07/22 03/07/22 03/07/22 COVID-19, Rapid (Ordered)   03/07/22 Rule-Out Test Resulted    COVID-19 (Rule Out) 01/13/22 01/13/22 01/13/22 SARS-CoV-2 NAAT (Rapid) (Ordered)   01/13/22 Rule-Out Test Resulted    COVID-19 (Rule Out) 08/23/21 08/23/21 08/23/21 COVID-19, Rapid (Ordered)   08/23/21 Rule-Out Test Resulted            Nurse Assessment:  Last Vital Signs: BP (!) 135/58   Pulse 98   Temp 97.8 °F (36.6 °C) (Oral)   Resp 22   Ht 5' 9\" (1.753 m)   Wt (!) 305 lb 8.9 oz (138.6 kg)   SpO2 92%   BMI 45.12 kg/m²     Last documented pain score (0-10 scale): Pain Level: 3  Last Weight:   Wt Readings from Last 1 Encounters:   05/29/22 (!) 305 lb 8.9 oz (138.6 kg)     Mental Status:  {IP PT MENTAL STATUS:20030}    IV Access:  {[facility] IV ACCESS:049499094}    Nursing Mobility/ADLs:  Walking   {CHP DME NPZN:967456257}  Transfer  {CHP DME JEKH:702714425}  Bathing  {CHP DME PUAP:910076984}  Dressing  {CHP DME PQFZ:837394482}  Toileting  {CHP DME AWTO:450986954}  Feeding  {CHP DME NWCD:876630602}  Med Admin  {CHP DME JNNU:966171318}  Med Delivery   {[facility] MED Delivery:397223106}    Wound Care Documentation and Therapy:  Wound 01/14/22 Leg Left; Lower; Lateral (Active)   Wound Image   04/26/22 1153   Wound Etiology Venous 05/02/22 0939 Dressing Status Clean;Dry; Intact 05/02/22 0939   Wound Cleansed Cleansed with saline 05/02/22 8292   Dressing/Treatment Xeroform;Dry dressing;Roll gauze;ABD; Ace wrap 05/02/22 0939   Offloading for Diabetic Foot Ulcers Other (comment) 05/02/22 0939   Dressing Change Due 05/03/22 05/02/22 0939   Wound Length (cm) 9.5 cm 04/26/22 1153   Wound Width (cm) 8 cm 04/26/22 1153   Wound Depth (cm) 0.1 cm 04/26/22 1153   Wound Surface Area (cm^2) 76 cm^2 04/26/22 1153   Change in Wound Size % (l*w) -744.44 04/26/22 1153   Wound Volume (cm^3) 7.6 cm^3 04/26/22 1153   Wound Healing % -744 04/26/22 1153   Distance Tunneling (cm) 0 cm 04/26/22 1153   Tunneling Position ___ O'Clock 0 04/26/22 1153   Undermining Starts ___ O'Clock 0 04/26/22 1153   Undermining Ends___ O'Clock 0 04/26/22 1153   Undermining Maxium Distance (cm) 0 04/26/22 1153   Wound Assessment Bleeding;Blood filled blister;Slough 05/01/22 0835   Drainage Amount Moderate 04/30/22 2315   Drainage Description Serous 04/30/22 2315   Odor None 04/30/22 2315   Radha-wound Assessment Hemosiderin staining (brown yellow);Edematous 05/01/22 0835   Margins Defined edges 04/30/22 2315   Wound Thickness Description not for Pressure Injury Partial thickness 04/30/22 2315   Number of days: 134       Wound 01/14/22 Leg Left; Lower; Lateral (Active)   Number of days: 134       Wound 04/04/22 Leg Right; Lower; Inner;Lateral cluster of 2 (Active)   Wound Image   04/26/22 1153   Wound Etiology Venous 05/29/22 1601   Dressing Status Dry; Intact; Old drainage noted 05/29/22 1601   Wound Cleansed Cleansed with saline 05/02/22 6168   Dressing/Treatment Xeroform;Dry dressing;Roll gauze; Ace wrap 05/02/22 0939   Offloading for Diabetic Foot Ulcers Other (comment) 05/02/22 0939   Dressing Change Due 05/28/22 05/29/22 1601   Wound Length (cm) 13 cm 04/26/22 1153   Wound Width (cm) 13 cm 04/26/22 1153   Wound Depth (cm) 0.1 cm 04/26/22 1153   Wound Surface Area (cm^2) 169 cm^2 04/26/22 1153   Change in Wound Size % (l*w) -350.67 04/26/22 1153   Wound Volume (cm^3) 16.9 cm^3 04/26/22 1153   Wound Healing % -351 04/26/22 1153   Distance Tunneling (cm) 0 cm 04/30/22 1123   Tunneling Position ___ O'Clock 0 04/30/22 1123   Undermining Starts ___ O'Clock 0 04/30/22 1123   Undermining Ends___ O'Clock 0 04/30/22 1123   Undermining Maxium Distance (cm) 0 04/30/22 1123   Wound Assessment Pink/red;Slough; Erythema 05/29/22 1601   Drainage Amount Small 05/29/22 1601   Drainage Description Serous 05/29/22 1601   Odor None 05/29/22 1601   Radha-wound Assessment Hemosiderin staining (brown yellow) 05/03/22 0958   Margins Defined edges 05/03/22 0958   Wound Thickness Description not for Pressure Injury Partial thickness 04/30/22 2315   Number of days: 55       Wound 04/26/22 Leg Left; Inner open in clusters (Active)   Wound Image   04/26/22 1153   Wound Etiology Venous 05/29/22 1601   Dressing Status Clean;Dry; Intact 05/29/22 1601   Wound Cleansed Cleansed with saline 05/03/22 0958   Dressing/Treatment Dry dressing;Gauze dressing/dressing sponge;ABD;Xeroform 05/03/22 0958   Offloading for Diabetic Foot Ulcers Offloading ordered 04/30/22 2315   Dressing Change Due 05/01/22 04/30/22 2315   Wound Length (cm) 14 cm 04/26/22 1153   Wound Width (cm) 7 cm 04/26/22 1153   Wound Depth (cm) 0.1 cm 04/26/22 1153   Wound Surface Area (cm^2) 98 cm^2 04/26/22 1153   Wound Volume (cm^3) 9.8 cm^3 04/26/22 1153   Distance Tunneling (cm) 0 cm 04/30/22 1123   Tunneling Position ___ O'Clock 0 04/30/22 1123   Undermining Starts ___ O'Clock 0 04/30/22 1123   Undermining Ends___ O'Clock 0 04/30/22 1123   Undermining Maxium Distance (cm) 0 04/30/22 1123   Wound Assessment Erythema;Pink/red 05/29/22 1601   Drainage Amount Small 05/29/22 1601   Drainage Description Serous 05/29/22 1601   Odor None 05/29/22 1601   Radha-wound Assessment Blisters 05/29/22 1601   Margins Defined edges 04/30/22 2315   Wound Thickness Description not for Pressure Injury Partial thickness 04/30/22 2315   Number of days: 33        Elimination:  Continence: Bowel: {YES / ER:91453}  Bladder: {YES / YQ:12519}  Urinary Catheter: None   Colostomy/Ileostomy/Ileal Conduit: No       Date of Last BM: ***    Intake/Output Summary (Last 24 hours) at 5/29/2022 1813  Last data filed at 5/29/2022 1345  Gross per 24 hour   Intake 600 ml   Output 350 ml   Net 250 ml     I/O last 3 completed shifts:  In: -   Out: 350 [Urine:350]    Safety Concerns: At Risk for Falls    Impairments/Disabilities:      Vision and Hearing    Nutrition Therapy:  Current Nutrition Therapy:   - Oral Diet:  General    Routes of Feeding: Oral  Liquids: Thin Liquids  Daily Fluid Restriction: no  Last Modified Barium Swallow with Video (Video Swallowing Test): not done    Treatments at the Time of Hospital Discharge:   Respiratory Treatments: ***  Oxygen Therapy:  is not on home oxygen therapy.   Ventilator:    - No ventilator support    Rehab Therapies: Physical Therapy and Occupational Therapy  Weight Bearing Status/Restrictions: No weight bearing restrictions  Other Medical Equipment (for information only, NOT a DME order):  walker  Other Treatments: ***    Patient's personal belongings (please select all that are sent with patient):  Black Shorts and white t shirt     RN SIGNATURE:  Electronically signed by Abeba Greer RN on 5/30/22 at 3:49 PM EDT    CASE MANAGEMENT/SOCIAL WORK SECTION    Inpatient Status Date: ***    Readmission Risk Assessment Score:  Readmission Risk              Risk of Unplanned Readmission:  0           Discharging to Facility/ Agency   Name: [facility]  Address:  Phone: [phone]  Fax: [phone]      Dialysis Facility (if applicable)   Name:  Address:  Dialysis Schedule:  Phone:  Fax:    / signature: Electronically signed by CASSY Bill on 5/30/22 at 12:25 PM EDT    PHYSICIAN SECTION    Prognosis: Fair    Condition at Discharge: Stable    Rehab Potential (if transferring to Rehab): Fair    Recommended Labs or Other Treatments After Discharge:     Physician Certification: I certify the above information and transfer of Jaguar Chinchilla  is necessary for the continuing treatment of the diagnosis listed and that he requires [facility] for less 30 days.      Update Admission H&P: No change in H&P    PHYSICIAN SIGNATURE:  Electronically signed by Luis A Bella MD on 5/29/22 at 6:14 PM EDT

## 2022-05-29 NOTE — PROGRESS NOTES
Occupational Therapy  Facility/Department: [facility] 5T ORTHO/NEURO  Occupational Therapy Initial Assessment / Treatment / Discharge    Name: Angelica Honeycutt  : 1955  MRN: [id number]  Date of Service: 2022    Discharge Recommendations: Angelica Honeycutt scored a  on the AM-[facility] ADL Inpatient form. At this time, no further OT is recommended upon discharge due to being at/near baseline. Recommend patient returns to prior setting with prior services. Patient Diagnosis(es): There were no encounter diagnoses. Past Medical History:  has a past medical history of Anemia, Angina, Arthritis, CAD (coronary artery disease), CHF (congestive heart failure) ([facility] [address] [address] CKD (chronic kidney disease) stage 3, GFR 30-59 ml/min ([facility]), Clostridium difficile diarrhea, Diabetes mellitus ([address] Diabetic neuropathy ([facility] [address] Disease of blood and blood forming organ, Dizziness, GERD (gastroesophageal reflux disease), Headache, Hearing loss, Hyperlipidemia, Hypertension, Kidney stone, Refusal of blood transfusions as patient is Anabaptism, Sleep apnea, Tinnitus, Venous ulcer ([address] and Wound, open. Past Surgical History:  has a past surgical history that includes hernia repair (age1); Cholecystectomy (2011); other surgical history (- REPAIR LOWER STERNAL INCISION, REMOVAL OF ONE STERNAL WIRE,); Upper gastrointestinal endoscopy; Cardiac surgery; other surgical history (10/10/2014); and Pain management procedure (N/A, 2/10/2022). Treatment Diagnosis: Impared ADL      Assessment   Performance deficits / Impairments: Decreased ADL status  Assessment: Pt is 69y M from [facility], where he is IND w/ fxl mobility and has assist for ADLs. Pt presently SUPV in fxl mobiliyt - no needs requiring skilled OT identified, anticipate pt to retn to [facility] w/ services. Treatment Diagnosis: Impared ADL  Prognosis: Fair  Decision Making: Medium Complexity  No Skilled OT: At baseline function; No OT goals identified  Activity Tolerance  Activity Tolerance: Patient Tolerated treatment well        Restrictions  Position Activity Restriction  Other position/activity restrictions: Bedrest    Subjective   General  Chart Reviewed: Yes,Orders,Progress Notes  Family / Caregiver Present: No  Referring Practitioner: Cruz Rangel MD  Diagnosis: subdural hematoma  Subjective  Subjective: Pt up walking on arrival in room with  no socks on. OT/PT proceeded w/ eval as pt was already up     Social/Functional History  Social/Functional History  Lives With: Alone  Type of Home: Facility ([facility])  Home Layout: One level  Home Access: Level entry  Bathroom Shower/Tub: Walk-in shower  Bathroom Toilet: Handicap height  Bathroom Equipment: Grab bars in shower,Shower chair,Hand-held shower  Home Equipment: Oxygen,Rollator (3-4L baseline)  Has the patient had two or more falls in the past year or any fall with injury in the past year?: Yes (reason for hospitalization - no other falls)  Receives Help From: Other (comment) (staff)  ADL Assistance: Needs assistance  Homemaking Assistance: Needs assistance (dining room for meals, assist with cleaning/laundry)  Ambulation Assistance: Independent (uses rollator)  Transfer Assistance: Independent  Active : No  Type of Occupation: retired restaurant owner  Additional Comments: Pt has been at [address] [place] [address] ~2wk - asssit w/ bathing (and dressing as needed). Pt denies falls.        Objective   Heart Rate: 94  Heart Rate Source: Monitor  BP: 135/80  BP Location: Left upper arm  BP Method: Automatic  Patient Position: Sitting  MAP (Calculated): 98.33  Resp: 20  SpO2: 94 %  O2 Device: Nasal cannula             Safety Devices  Type of Devices: Left in chair;Chair alarm in place;Call light within reach;Nurse notified (LEs elevated)  Restraints  Restraints Initially in Place: No     Toilet Transfers  Toilet - Technique: Ambulating  Equipment Used: Grab bars (BSC placed over toilet as frame)  Toilet Transfer: Supervision ADL  Grooming: Supervision (oral care, face wash, hand hygeien in stance at sink)  LE Dressing: Dependent/Total (slipper socks)           Transfers  Sit to stand: Supervision  Stand to sit: Supervision                        Education Given To: Patient  Education Provided: Role of Therapy  Education Method: Demonstration;Verbal  Education Outcome: Verbalized understanding  LUE AROM (degrees)  LUE AROM : WFL  RUE AROM (degrees)  RUE AROM : [facility]          AM-PAC Score        AM-[facility] Inpatient Daily Activity Raw Score: 19 (05/29/22 1357)  AM-PAC Inpatient ADL T-Scale Score : 40.22 (05/29/22 1357)  ADL Inpatient CMS 0-100% Score: 42.8 (05/29/22 1357)  ADL Inpatient CMS G-Code Modifier : CK (05/29/22 1357)    Goals          Therapy Time   Individual Concurrent Group Co-treatment   Time In 8109         Time Out 1233         Minutes 38         Timed Code Treatment Minutes: 23 Minutes+15eval=38 tot     Joan Belts, MOT-OTR/L [id number]

## 2022-05-29 NOTE — PROGRESS NOTES
Clinical Pharmacy Progress Note     All IVs in Normal Saline - Management by Pharmacy    Consult Date(s): 5/29/22  Consulting Provider(s): Dr. Loura Scheuermann     Neurologic Injury (SDH) - All IVs in Normal Saline   Drips will be adjusted to normal saline as appropriate based on compatibility, in an effort to avoid fluid shifts, as D5W is osmotically active.  The following intermittent IV drips / infusions have been adjusted to saline:  o None at this time   Note: Patient has D5W ordered as part of hypoglycemia orderset.  Total IV fluid delivered to patient over last 24 hrs: 0 mL   RPh will follow daily to ensure all IVPBs / drips are in NS where possible. Thanks for consulting pharmacy!   Urbano Burgess PharmD  Pharmacy Resident   Please call with questions [phone]  5/29/2022 10:00 AM

## 2022-05-29 NOTE — PROGRESS NOTES
4 Eyes Admission Assessment     I agree as the admission nurse that 2 RN's have performed a thorough Head to Toe Skin Assessment on the patient. ALL assessment sites listed below have been assessed on admission. Areas assessed by both nurses:   [x]   Head, Face, and Ears   [x]   Shoulders, Back, and Chest  [x]   Arms, Elbows, and Hands   [x]   Coccyx, Sacrum, and Ischium  [x]   Legs, Feet, and Heels        Does the Patient have Skin Breakdown?   Yes a wound was noted on the Admission Assessment and an LDA was Initiated documentation include the Radha-wound, Wound Assessment, Measurements, Dressing Treatment, Drainage, and Color\",         John Prevention initiated:  Yes   Wound Care Orders initiated:  Yes      [id number]  nurse consulted for Pressure Injury (Stage 3,4, Unstageable, DTI, NWPT, and Complex wounds) or John score 18 or lower:  Yes      Nurse 1 eSignature: Electronically signed by Shy To RN on 5/29/22 at 4:29 AM EDT    **SHARE this note so that the co-signing nurse is able to place an eSignature**    Nurse 2 eSignature: Electronically signed by Silver Owens RN on 5/29/22 at 4:42 AM EDT

## 2022-05-29 NOTE — PLAN OF CARE
[place] neurosurgery note    66yo M with ground level fall today and found to have 4mm tentorial SDH on R. Neuro intact. Does take ASA. Plan:  1. Transfer to [facility]. 2. Repeat CT 6 hrs from initial scan  3. Hold ASA  4. Keep SBP <160    L.  Justin Tariq MD

## 2022-05-29 NOTE — PROGRESS NOTES
Patient admitted from [facility] ED for subdural hematoma. Patient A/O x4 and very talkative. VSS. Patient oriented to rm and staff.

## 2022-05-29 NOTE — PROGRESS NOTES
Spoke with Dr. Shukri Beckford. Dr. Shukri Beckford would like  to start working on 7 pm discharge time.

## 2022-05-29 NOTE — ACP (ADVANCE CARE PLANNING)
Advanced Care Planning Note. Purpose of Encounter: Advanced care planning in light of SDH  Parties In Attendance: Patient  Decisional Capacity: Yes  Subjective: Patient is weak and tired  Objective: Cr 1.8 on 22  Goals of Care Determination: Patient wants limited support (No CPR, no vent, no HD, no surgery, no trach, no PEG)  Plan:  NS consult. SNF placement  Code Status: DNR CCA/DNI   Time spent on Advanced care Plannin minutes  Advanced Care Planning Documents: Completed advanced directives on chart, Beto Poe is the POA.     Sarwat Mcintyre MD  2022 6:12 PM

## 2022-05-29 NOTE — H&P
HOSPITALISTS HISTORY AND PHYSICAL    5/29/2022 9:27 PM    Patient Information:  Joann Piña is a 75 y.o. male [id number]  PCP:  No primary care provider on file. (Tel: None )    Chief complaint:  Transfer from [facility] with R SDH    History of Present Illness:  Katerina Cary is a 75 y.o. male with history of CAD, DM 2, morbid obesity, chronic respiratory failure with hypoxia on 3 L NC, chronic cor pulmonale/diastolic CHF was sent from [facility] SN[facility] to [place] ER with dizziness and fall with R head trauma. Patient with R occipital scalp contusion. Found to have small acute SDH in R tentorium on CT Head. Patient on ASA. No CP, SOB, abdominal pain, palpitations, fevers, chills, NS, nausea, vomiting, dysphagia, change in speech, change in vision, cough or pleurisy. Transferred to Neuro ICU for SDH at [facility]. Otherwise complete ROS is negative unless listed above. REVIEW OF SYSTEMS:   Pertinent positives as noted in HPI. All other systems were reviewed and are negative. Past Medical History:   has a past medical history of Anemia, Angina, Arthritis, CAD (coronary artery disease), CHF (congestive heart failure) ([address] CKD (chronic kidney disease) stage 3, GFR 30-59 ml/min ([facility]), Clostridium difficile diarrhea, Diabetes mellitus ([address] Diabetic neuropathy ([address] Disease of blood and blood forming organ, Dizziness, GERD (gastroesophageal reflux disease), Headache, Hearing loss, Hyperlipidemia, Hypertension, Kidney stone, Refusal of blood transfusions as patient is Religious, Sleep apnea, Tinnitus, Venous ulcer ([address] and Wound, open. Past Surgical History:   has a past surgical history that includes hernia repair (age1);  Cholecystectomy (9/2011); other surgical history (11-16-11 REPAIR LOWER STERNAL INCISION, REMOVAL OF ONE STERNAL WIRE,); Upper gastrointestinal endoscopy; Cardiac surgery; other surgical history (10/10/2014); and Pain management procedure (N/A, 2/10/2022). Medications:  No current facility-administered medications on file prior to encounter.      Current Outpatient Medications on File Prior to Encounter   Medication Sig Dispense Refill    ondansetron (ZOFRAN-ODT) 4 MG disintegrating tablet       RHOPRESSA 0.02 % SOLN       dorzolamide (TRUSOPT) 2 % ophthalmic solution       brimonidine-timolol (COMBIGAN) 0.2-0.5 % ophthalmic solution       metOLazone (ZAROXOLYN) 5 MG tablet Take 1 tablet by mouth once a week On Fridays 12 tablet 0    isosorbide mononitrate (IMDUR) 30 MG extended release tablet Take 1 tablet by mouth daily (Patient taking differently: Take 30 mg by mouth daily 0.5 tablet once a day) 30 tablet 1    insulin glargine (LANTUS SOLOSTAR) 100 UNIT/ML injection pen Inject 35 Units into the skin daily 5 pen 3    carvedilol (COREG) 3.125 MG tablet Take 1 tablet by mouth 2 times daily (with meals) 60 tablet 3    insulin lispro (HUMALOG) 100 UNIT/ML injection vial Inject 10 Units into the skin 3 times daily (with meals) 10 mL 0    torsemide (DEMADEX) 100 MG tablet Take 1 tablet by mouth daily 30 tablet 3    linaclotide (LINZESS) 290 MCG CAPS capsule Take 1 capsule by mouth every morning (before breakfast) 30 capsule 1    tamsulosin (FLOMAX) 0.4 MG capsule Take 0.4 mg by mouth Daily      atorvastatin (LIPITOR) 80 MG tablet Take 1 tablet by mouth nightly 30 tablet 3    allopurinol (ZYLOPRIM) 300 MG tablet Take 1 tablet by mouth daily 30 tablet 0    naloxone 4 MG/0.1ML LIQD nasal spray 1 spray by Nasal route as needed for Opioid Reversal 1 each 0    acetaminophen (TYLENOL) 500 MG tablet Take 2 tablets by mouth 4 times daily as needed for Pain 60 tablet 0    senna (SENOKOT) 8.6 MG TABS tablet Take 1 tablet by mouth 2 times daily 120 tablet 0    docusate sodium (COLACE, DULCOLAX) 100 MG CAPS Take 100 mg by mouth 2 times daily      Compression Stockings MISC by Does not apply route 2 pair, knee high compression stockings, fitted/ zippered 2 each 1    silver sulfADIAZINE (SILVADENE) 1 % cream Apply to BL lower leg ulcers daily 20 g 0    nitroGLYCERIN (NITROSTAT) 0.4 MG SL tablet Place 1 tablet under the tongue every 5 minutes as needed 25 tablet 1    fluticasone (FLONASE) 50 MCG/ACT nasal spray 1 spray by Nasal route nightly as needed.  ferrous sulfate 325 (65 FE) MG tablet Take 325 mg by mouth 2 times daily       omeprazole (PRILOSEC) 20 MG capsule Take 20 mg by mouth 2 times daily. Allergies: Allergies   Allergen Reactions    Amitriptyline Other (See Comments)     tremor    Celecoxib      Hyperkalemia    Cymbalta [Duloxetine Hcl] Other (See Comments)     Tremors and slurred speech    Elavil [Amitriptyline Hcl]      tremor    Gabapentin      Tremor at high dose    Nsaids      Gastric ulcer        Social History:  Patient Lives [facility]   reports that he quit smoking about 34 years ago. His smoking use included cigarettes. He has a 16.00 pack-year smoking history. He has never used smokeless tobacco. He reports that he does not drink alcohol and does not use drugs. Family History:  family history includes Cancer in his father; Diabetes in his brother and brother; Heart Disease in his father and mother. Physical Exam:  BP (!) 106/58   Pulse 85   Temp 98 °F (36.7 °C) (Oral)   Resp 20   Ht 5' 9\" (1.753 m)   Wt (!) 305 lb 8.9 oz (138.6 kg)   SpO2 94%   BMI 45.12 kg/m²     General appearance:  Appears comfortable. Well nourished, obese, pleasant  Eyes: Sclera clear, pupils equal  ENT: Moist mucus membranes, no thrush. Trachea midline. Cardiovascular: Regular rhythm, normal S1, S2. No murmur, gallop, rub.  Lymphedema in BL lower extremities  Respiratory: Clear to auscultation bilaterally, no wheeze, good inspiratory effort  Gastrointestinal: Abdomen soft, obese, non-tender, not distended, normal bowel sounds  Musculoskeletal: No cyanosis in digits, neck supple  Neurology: Cranial nerves grossly intact. Alert and oriented in time, place and person. No speech or motor deficits  Psychiatry: Appropriate affect. Not agitated  Skin: Warm, dry, normal turgor, no rash  Brisk capillary refill, peripheral pulses palpable   Labs:  CBC:   Lab Results   Component Value Date    WBC 6.9 05/28/2022    RBC 4.06 05/28/2022    HGB 10.8 05/28/2022    HCT 33.8 05/28/2022    MCV 83.1 05/28/2022    MCH 26.6 05/28/2022    MCHC 32.0 05/28/2022    RDW 18.0 05/28/2022     05/28/2022    MPV 9.4 05/28/2022     BMP:    Lab Results   Component Value Date     05/28/2022    K 3.8 05/28/2022    CL 94 05/28/2022    CO2 30 05/28/2022    BUN 82 05/28/2022    CREATININE 1.8 05/28/2022    CALCIUM 9.6 05/28/2022    GFRAA 46 05/28/2022    GFRAA 53 06/06/2013    LABGLOM 38 05/28/2022    GLUCOSE 246 05/28/2022     CT HEAD WO CONTRAST   Final Result   1. Subdural hematoma along the right tentorium, measuring up to 6    mm in thickness. An addendum can be issued if the prior study could be provided for    comparison              Problem List  Principal Problem:    Subdural hematoma (HCC)  Active Problems:    SDH (subdural hematoma) (HCC)    Chronic respiratory failure with hypoxia ([address]    Coronary artery disease involving native coronary artery of native heart without angina pectoris    Essential hypertension    CKD (chronic kidney disease) stage 3, GFR 30-59 ml/min (HCC)    Chronic diastolic heart failure (HCC)    Morbid obesity (HCC)    DM2 (diabetes mellitus, type 2) (HCC)    Cor pulmonale, chronic (HCC)    PAF (paroxysmal atrial fibrillation) ([address]  Resolved Problems:    * No resolved hospital problems. *        Assessment/Plan:   1. Stop ASA  2. Neuro checks  3. Neurosurgery consult for SDH  4. PT/OT eval  5. Perc ocet PRN pain  6. Continue Imdur and Nimodipine  7. Lantus 20 U qhs  8.  Continue Lyrica      DVT prophylaxis SCD  Code status DNR-CCA/DNI  Diet General  IV access Peripheral  Little Catheter No    Place in Observation. I anticipate hospitalization spanning less than two midnights for investigation and treatment of the above medically necessary diagnoses. Discussed with patient and nursing.        Norman Sheth MD    5/29/2022 9:27 PM

## 2022-05-29 NOTE — ED PROVIDER NOTES
I independently performed a history and physical on [facility]. All diagnostic, treatment, and disposition decisions were made by myself in conjunction with the advanced practice provider. For further details of Carle Schwab emergency department encounter, please see Sindhu Pearl NP's documentation. Patient reports he was trying to clean out his left ear so he put his ear under the faucet and let water run and then he became very dizzy and could not grab a hold of anything and fell to the ground striking the right side of his head. He is here because of dizziness and head injury. On exam he does have abrasion and contusion of the right occipital scalp. He is neurologically intact otherwise. No C-spine tenderness. CT Head WO Contrast  1. Acute small subdural hemorrhage along the right tentorium measuring 4 mm in thickness. Findings were discussed with Dr. Author Lynn at 10:42pm on 5/28/2022. CT Cervical Spine WO Contrast  1. No acute abnormality of the cervical spine. 2. Degenerative changes predominating mid cervical spine with mild acquired cervical canal stenosis C4-5 and C5-6 as result of posterior disc osteophyte complex. 3. Nonspecific left mastoid effusion, nearly always benign, reactive, however mastoiditis cannot be excluded based on CT appearance. Correlate with history, presentation and physical findings. 4. 2 cm incidental right thyroid nodule. Recommend thyroid US. RECOMMENDATIONS: 2 cm incidental right thyroid nodule. Recommend thyroid US. Reference: J Am Adriana Radiol. 2015 Feb;12(2): 143-50     I personally saw this patient and performed a substantive portion of the visit including all aspects of the medical decision making. MDM  Given the patient's aspirin therapy and small subdural hematoma I believe that he would benefit from hospitalization in a facility with neurosurgery backup. For this reason I consulted neurosurgery and spoke with Dr. Kath Barber.   She agreed with plan for transfer to [facility]  Dr. Haresh Nguyen, the hospitalist, accepted the patient in transfer as well. I personally saw this patient and independently provided 40 minutes of non-concurrent critical care out of the total shared critical care time provided. I spoke with Dr. Haresh Nguyen, [facility] hospitalist. We thoroughly discussed the history, physical exam, laboratory and imaging studies, as well as, current course of treatment within the emergency department. Based upon that discussion, we've decided to transfer Kehinde Manriquez to [facility], for further observation and evaluation of Kehinde Manriquez current condition. As I have deemed necessary from their history, physical, and studies, I have considered and evaluated Kehinde Manriquez for the following diagnoses:      CLINICAL IMPRESSION:  1. Subdural hematoma ([address]    2.  Fall, initial encounter        BP (!) 129/54   Pulse 85   Temp 98.4 °F (36.9 °C) (Oral)   Resp 18   Ht 5' 9\" (1.753 m)   Wt 297 lb (134.7 kg)   SpO2 92%   BMI 43.86 kg/m²        Miguel Lucas MD  05/29/22 9709

## 2022-05-29 NOTE — CONSULTS
Neurosurgery Consult:    Patient Name: Alexandra Valdez YOB: 1955   Sex: Male Age: 75 yrs     Medical Record Number: [id number] Acct Number: [de-identified]   Room Number: 5178/7208-50 Hospital Day: Hospital Day: 1     Requesting physician: Jack Leo MD    Reason for consultation: tentorial sdh    History of present illness: Patient is a 75 y.o. male  has a past medical history of Anemia, Angina, Arthritis, CAD (coronary artery disease), CHF (congestive heart failure) ([address] CKD (chronic kidney disease) stage 3, GFR 30-59 ml/min ([address] Clostridium difficile diarrhea, Diabetes mellitus ([address] Diabetic neuropathy ([address] Disease of blood and blood forming organ, Dizziness, GERD (gastroesophageal reflux disease), Headache, Hearing loss, Hyperlipidemia, Hypertension, Kidney stone, Refusal of blood transfusions as patient is Christianity, Sleep apnea, Tinnitus, Venous ulcer ([address] and Wound, open. who presented after fall. He was cleaning his ears with cold water and got dizzy and fell. Head CT with R tentorial SDH.    ROS:   Denies fever or recent illness.    Denies chest pain  Denies shortness of breath  Denies changes in bowel or bladder function    VITAL SIGNS   /80   Pulse 94   Temp 98 °F (36.7 °C) (Oral)   Resp 20   Ht 5' 9\" (1.753 m)   Wt (!) 305 lb 8.9 oz (138.6 kg)   SpO2 94%   BMI 45.12 kg/m²    Height Height: 5' 9\" (175.3 cm)   Weight Weight: (!) 305 lb 8.9 oz (138.6 kg)        Allergies Allergies   Allergen Reactions    Amitriptyline Other (See Comments)     tremor    Celecoxib      Hyperkalemia    Cymbalta [Duloxetine Hcl] Other (See Comments)     Tremors and slurred speech    Elavil [Amitriptyline Hcl]      tremor    Gabapentin      Tremor at high dose    Nsaids      Gastric ulcer      NPO Status Diet NPO   Isolation No active isolations     MEDICAL HISTORY   Past Medical History       Diagnosis Date    Anemia     Angina     Arthritis     CAD (coronary artery disease)     CHF (congestive heart failure) ([facility])     CKD (chronic kidney disease) stage 3, GFR 30-59 ml/min ([facility])     Clostridium difficile diarrhea 3/16/12; 12    positive stool toxin    Diabetes mellitus [facility]     Diabetic neuropathy ([facility]     feet and legs    Disease of blood and blood forming organ     Dizziness     When he moves his head/ loses his balance    GERD (gastroesophageal reflux disease)     gastric ulcer    Headache     Hearing loss     Hyperlipidemia     Hypertension     Kidney stone     Refusal of blood transfusions as patient is Gnosticist     Sleep apnea     Tinnitus     Venous ulcer ([facility])     LLE    Wound, open 2012      Surgical History    has a past surgical history that includes hernia repair (age2); Cholecystectomy (2011); other surgical history (11 REPAIR LOWER STERNAL INCISION, REMOVAL OF ONE STERNAL WIRE,); Upper gastrointestinal endoscopy; Cardiac surgery; other surgical history (10/10/2014); and Pain management procedure (N/A, 2/10/2022). Social History   Social History     Occupational History    Not on file   Tobacco Use    Smoking status: Former Smoker     Packs/day: 1.00     Years: 16.00     Pack years: 16.00     Types: Cigarettes     Quit date: 1/10/1988     Years since quittin.4    Smokeless tobacco: Never Used    Tobacco comment: 22 yrs ago   Vaping Use    Vaping Use: Never used   Substance and Sexual Activity    Alcohol use: No     Alcohol/week: 0.0 standard drinks    Drug use: No    Sexual activity: Not Currently        The medical history was obtained from the patient & the medical records. The nursing notes, primary physician's notes, and old charts (if applicable) were reviewed.     LABS   Basic Metabolic Profile Recent Labs     22  2257      CL 94*   CO2 30   BUN 82*   CREATININE 1.8*   GLUCOSE 246*      Complete Blood Count Recent Labs     22  2257   WBC 6.9   RBC 4.06* Coagulation Studies No results for input(s): PTT, INR in the last 72 hours. Invalid input(s): PLATELETS, PROA, PT, PTTA     MEDICATIONS   Inpatient Medications      Infusions      PRN      Antibiotics   Recent Abx Admin      No antibiotic orders with administrations found. PHYSICAL EXAMINATION     GENERAL: NAD, alert, appears stated age, and cooperative, obese  HEENT: Normocephalic, PERRL, EOMI, neck supple, trachea midline, lips without lesions  RESP: on nasal cannula baseline, symmetric chest rise  EXTREMITIES: LE edematous and wrapped  SKIN: edematous in LEs  NEURO: A&Ox4, FC - appendicular   CN II-XII grossly intact: no facial droop, EOMI, tongue midline, voice wnl,  hearing intact   NEGRETE spontaneously, no drift   Gait exam deferred 2/2 patient condition    IMAGING   I personally reviewed the patient's imaging which consists of head CT x2 which shows stable R tentorial subdural.    ASSESSMENT & PLAN   64yo M on baby ASA s/p ground level fall with R tentorial subdural    Plan:  1. Repeat CT stable and neuro intact  2. Hold ASA and any other anticoagulation x 2 weeks  3. F/u neurosurgery in 1 month      Thank you for the consultation.     Elif Bowles. [address]

## 2022-05-29 NOTE — PROGRESS NOTES
[place] neurosurgery note    S: Doing ok this morning. C/o bed he has to sleep on at nursing facility    O: AFVSS  A&Ox4  F/cx4. No drift. Face symmetric, sees only out of L eye. A/P: s/p ground level fall with R tentorial sdh which is stable on repeat imaging. [facility] to d/c home from neurosurgery perspective  2. No ASA or blood thinners x 2 weeks  3. F/u as outpatient in 4 weeks with repeat head CT    NATO Tariq MD

## 2022-05-29 NOTE — PROGRESS NOTES
Physical Therapy  Facility/Department: [facility] 5T ORTHO/NEURO  Physical Therapy Initial Assessment and [address]    Name: Suan Duverney  : 1955  MRN: [id number]  Date of Service: 2022    Discharge Recommendations:  Suan Duverney scored a 23/24 on the AM-PAC short mobility form. At this time, no further PT is recommended upon discharge. Recommend patient returns to prior setting with prior services. PT Equipment Recommendations  Equipment Needed: No        Assessment   Assessment: Pt from assisted living with SDH s/p fall. No neuro deficits noted on PT exam.  Pt demonstrates transfers and gait at/near functional baseline. Pt in agreement and voices no concerns returning home. Recommend daily activity with RN staff to maintain strength in hospital setting. Will sign off. Decision Making: Medium Complexity        Plan   Plan: Discharge with evaluation only    Safety Devices  Type of Devices: Left in chair,Chair alarm in place,Call light within reach,Nurse notified (LEs elevated)    Restrictions  Position Activity Restriction  Other position/activity restrictions: Bedrest     Subjective   Chart Reviewed: Yes  Additional Pertinent Hx: Pt to [facility] ED  with fall. Imaging (+) for SDH. Pt transferred to [facility] for neurosurgery consult. Repeat Head CT stable. PMH:  anemia, DM, CHF, CAD, angina, CKD, HTN  Diagnosis: SDH    Subjective  Subjective: Pt found up ambulating in room with no socks on. \"I can't sit in that chair. I have to move around. \"  Pt denies pain.     Social/Functional History  Lives With: Alone  Type of Home: Facility ([facility])  Home Layout: One level  Home Access: Level entry  Bathroom Shower/Tub: Walk-in shower  Bathroom Toilet: Handicap height  Bathroom Equipment: Grab bars in shower,Shower chair,Hand-held shower  Home Equipment: Oxygen,Rollator (3-4L baseline)  Has the patient had two or more falls in the past year or any fall with injury in the past year?: Yes (reason for hospitalization - no other falls)  Receives Help From: Other (comment) (staff)  ADL Assistance: Needs assistance  Homemaking Assistance: Needs assistance (dining room for meals, assist with cleaning/laundry)  Ambulation Assistance: Independent (uses rollator)  Transfer Assistance: Independent  Active : No  Type of Occupation: retired restaurant owner  Additional Comments: [facility] w/ [facility] (and dressing as needed). Pt denies falls. Vision/Hearing  Hearing: Within functional limits    Vision:  Within functional limits    Cognition   Orientation  Overall Orientation Status: Within Functional Limits     Objective   Gross Assessment  AROM: Within functional limits  Strength:  Within functional limits     Transfers  Sit to Stand: Supervision  Stand to sit: Supervision     Ambulation  Device: Rolling Walker  Other Apparatus: O2 (5L)  Assistance: Supervision  Quality of Gait: steady gait  Gait Deviations: Slow Chrissy;Decreased step length;Decreased step height  Distance: 25ft     Balance  Sitting - Static: Good  Sitting - Dynamic: Good  Standing - Static: Good  Standing - Dynamic: Good (using rolling walker)      AM-PAC Score  AM-PAC Inpatient Mobility Raw Score : 23 (05/29/22 1355)  AM-PAC Inpatient T-Scale Score : 56.93 (05/29/22 1355)  Mobility Inpatient CMS 0-100% Score: 11.2 (05/29/22 1355)  Mobility Inpatient CMS G-Code Modifier : CI (05/29/22 1355)      Education  Patient Education  Education Given To: Patient  Education Provided: Role of Therapy  Education Method: Verbal  Barriers to Learning: None  Education Outcome: Verbalized understanding      Therapy Time   Individual Concurrent Group Co-treatment   Time In 1155         Time Out 1233         Minutes 38         Timed Code Treatment Minutes:  25  Total Treatment Minutes:  [address] [address], PT

## 2022-05-29 NOTE — PROGRESS NOTES
Clinical Pharmacy Progress Note     All IVs in Normal Saline - Management by Pharmacy    Consult Date(s): 5/29/22  Consulting Provider(s): Dr. Kaylee Pyle     Neurologic Injury (SDH) - All IVs in Normal Saline   Drips will be adjusted to normal saline as appropriate based on compatibility, in an effort to avoid fluid shifts, as D5W is osmotically active.  The following intermittent IV drips / infusions have been adjusted to saline:  o None at this time   The following medications must remain in D5W due to incompatibility with normal saline:  o Amphotericin  o Mycophenolate  o Nitroprusside  o Penicillin G Potassium   Note: Patient has D5W ordered as part of hypoglycemia orderset.  Total IV fluid delivered to patient over last 24 hrs: TBD   [facility] will follow daily to ensure all IVPBs / drips are in NS where possible. Thank you for consulting Pharmacy!   Shayna Liao, PharmD, BCPS

## 2022-05-29 NOTE — ED PROVIDER NOTES
[address] ED  EMERGENCY DEPARTMENT ENCOUNTER        Pt Name: Clau Trejo  MRN: [id number]  [place] 1955  Date of evaluation: 5/28/2022  Provider: PATRICK Cardenas CNP  PCP: No primary care provider on file. This patient was seen and evaluated by the attending physician Miguel Sauer MD.    I have evaluated this patient. My supervising physician was available for consultation. CHIEF COMPLAINT       Chief Complaint   Patient presents with    Fall     pt. fall from standing position earlier today. pt. c/o R. shoulder pain and pain from previous injury to R. rib. Pt. with small abrasion to back of head. HISTORY OF PRESENT ILLNESS   (Location/Symptom, Timing/Onset, Context/Setting, Quality, Duration, Modifying Factors, Severity)  Note limiting factors. Clau Trejo is a 75 y.o. male who presents via ambulance for  Evaluation after fall. Onset was this evening. Duration has been since the onset. Context includes patient reporting he was standing at the sink rinsing his ears with cold water and became dizzy and fell backwards from a standing position. He denies loss of consciousness or vomiting and denies that he takes blood thinners. He is complaining of head pain and pain to his right ear. He denies recent illness and denies dizziness prior to rinsing his ears with cold water. Quality is throbbing  with radiation to his head. Alleviating factors include nothing. Aggravating factors include movement  And palpation. Pain is 9/10. Percocet has been used for pain today. Chart review reveals pt has significant PMHx of anemia, angina, arthritis, coronary artery disease, congestive heart failure, chronic kidney disease, diabetes, GERD, hyperlipidemia, hypertension, sleep apnea, tinnitus, venous ulcers.  They take metolazone, imdur, lantus, carvedilol, humalog, demadex, linzess, flomax, lipitor, lyrica, allopurinol, tylenol, docusate, nitroglycerin, flonase, aspirin, prilosec . Nursing Notes were all reviewed and agreed with or any disagreements were addressed  in the HPI. Pt was seen during the [place] 19 pandemic. Appropriate PPE worn by ME during patient encounters. Pt seen during a time with constrained hospital bed capacity and other potential inpatient and outpatient resources were constrained due to the viral pandemic. REVIEW OF SYSTEMS    (2-9 systems for level 4, 10 or more for level 5)     Review of Systems   Constitutional: Negative for chills, diaphoresis and fever. HENT: Negative for congestion, rhinorrhea and sore throat. Eyes: Negative for pain and visual disturbance. Respiratory: Negative for cough and shortness of breath. Cardiovascular: Negative for chest pain and leg swelling. Gastrointestinal: Negative for abdominal pain, constipation, diarrhea, nausea and vomiting. Genitourinary: Negative for dysuria, frequency and urgency. Musculoskeletal: Negative for back pain, neck pain and neck stiffness. Skin: Positive for wound. Negative for rash. Abrasion to occipital region of head   Neurological: Positive for headaches. Negative for dizziness and light-headedness. Positives and Pertinent negatives as per HPI. Except as noted abovein the ROS, all other systems were reviewed and negative.        PAST MEDICAL HISTORY     Past Medical History:   Diagnosis Date    Anemia     Angina     Arthritis     CAD (coronary artery disease)     CHF (congestive heart failure) ([facility])     CKD (chronic kidney disease) stage 3, GFR 30-59 ml/min ([facility])     Clostridium difficile diarrhea 3/16/12; 2/29/12    positive stool toxin    Diabetes mellitus ([facility] [address]     Diabetic neuropathy ([facility])     feet and legs    Disease of blood and blood forming organ     Dizziness     When he moves his head/ loses his balance    GERD (gastroesophageal reflux disease)     gastric ulcer    Headache     Hearing loss     Hyperlipidemia     Hypertension     Kidney stone capsule by mouth every morning (before breakfast), Disp-30 capsule, R-1Normal      tamsulosin (FLOMAX) 0.4 MG capsule Take 0.4 mg by mouth DailyHistorical Med      atorvastatin (LIPITOR) 80 MG tablet Take 1 tablet by mouth nightly, Disp-30 tablet, R-3Normal      allopurinol (ZYLOPRIM) 300 MG tablet Take 1 tablet by mouth daily, Disp-30 tablet, R-0NO PRINT      pregabalin (LYRICA) 25 MG capsule Take 1 capsule by mouth 2 times daily for 1 day., Disp-2 capsule, R-0NO PRINT      naloxone 4 MG/0.1ML LIQD nasal spray 1 spray by Nasal route as needed for Opioid Reversal, Disp-1 each, R-0Normal      acetaminophen (TYLENOL) 500 MG tablet Take 2 tablets by mouth 4 times daily as needed for Pain, Disp-60 tablet,R-0Print      senna (SENOKOT) 8.6 MG TABS tablet Take 1 tablet by mouth 2 times daily, Disp-120 tablet, R-0Print      docusate sodium (COLACE, DULCOLAX) 100 MG CAPS Take 100 mg by mouth 2 times dailyOTC      Compression Stockings MISC Starting 6/15/2016, Until Discontinued, Disp-2 each, R-1, Print2 pair, knee high compression stockings, fitted/ zippered      silver sulfADIAZINE (SILVADENE) 1 % cream Apply to BL lower leg ulcers daily, Disp-20 g, R-0, Print      nitroGLYCERIN (NITROSTAT) 0.4 MG SL tablet Place 1 tablet under the tongue every 5 minutes as needed, Disp-25 tablet, R-1      fluticasone (FLONASE) 50 MCG/ACT nasal spray 1 spray by Nasal route nightly as needed. aspirin 81 MG chewable tablet Take 1 tablet by mouth daily. , Disp-30 tablet      ferrous sulfate 325 (65 FE) MG tablet Take 325 mg by mouth 2 times daily Historical Med      omeprazole (PRILOSEC) 20 MG capsule Take 20 mg by mouth 2 times daily.                ALLERGIES     Amitriptyline, Celecoxib, Cymbalta [duloxetine hcl], Elavil [amitriptyline hcl], Gabapentin, and Nsaids    FAMILYHISTORY       Family History   Problem Relation Age of Onset    Heart Disease Mother     Heart Disease Father     Cancer Father     Diabetes Brother     Diabetes Unstable Housing in the Last Year: Not on file       SCREENINGS    [place] Coma Scale  Eye Opening: Spontaneous  Best Verbal Response: Oriented  Best Motor Response: Obeys commands  Jigar Coma Scale Score: 15        PHYSICAL EXAM    (up to 7 for level 4, 8 or more for level 5)     ED Triage Vitals [05/28/22 2055]   BP Temp Temp Source Heart Rate Resp SpO2 Height Weight   (!) 122/48 98.4 °F (36.9 °C) Oral 73 18 92 % 5' 9\" (1.753 m) 297 lb (134.7 kg)       Physical Exam  Vitals and nursing note reviewed. Constitutional:       Appearance: Normal appearance. He is not ill-appearing or diaphoretic. HENT:      Head: Normocephalic. Abrasion and contusion present. No raccoon eyes, Mckeon's sign or laceration. Jaw: There is normal jaw occlusion. Right Ear: Hearing, tympanic membrane, ear canal and external ear normal. No mastoid tenderness. No hemotympanum. Left Ear: Hearing, ear canal and external ear normal. No mastoid tenderness. There is hemotympanum. Eyes:      General:         Right eye: No discharge. Left eye: No discharge. Extraocular Movements: Extraocular movements intact. Pupils: Pupils are equal, round, and reactive to light. Cardiovascular:      Rate and Rhythm: Normal rate and regular rhythm. Pulses: Normal pulses. Heart sounds: Normal heart sounds. No murmur heard. No friction rub. No gallop. Pulmonary:      Effort: Pulmonary effort is normal. No respiratory distress. Breath sounds: Normal breath sounds. No stridor. No wheezing, rhonchi or rales. Abdominal:      General: Abdomen is flat. Bowel sounds are normal.      Palpations: Abdomen is soft. Musculoskeletal:         General: Normal range of motion. Cervical back: Normal, normal range of motion and neck supple. Thoracic back: Normal.      Lumbar back: Normal.   Skin:     General: Skin is warm and dry. Capillary Refill: Capillary refill takes less than 2 seconds. Neurological:      General: No focal deficit present. Mental Status: He is alert and oriented to person, place, and time. GCS: GCS eye subscore is 4. GCS verbal subscore is 5. GCS motor subscore is 6. Cranial Nerves: Cranial nerves are intact. Sensory: Sensation is intact. Motor: Motor function is intact. Coordination: Coordination is intact. Psychiatric:         Mood and Affect: Mood normal.         Behavior: Behavior normal.       PHYSICAL EXAM  /64   Pulse 79   Temp 98.4 °F (36.9 °C) (Oral)   Resp 18   Ht 5' 9\" (1.753 m)   Wt 297 lb (134.7 kg)   SpO2 93%   BMI 43.86 kg/m²     DIAGNOSTIC RESULTS   LABS:    Labs Reviewed   CBC WITH AUTO DIFFERENTIAL - Abnormal; Notable for the following components:       Result Value    RBC 4.06 (*)     Hemoglobin 10.8 (*)     Hematocrit 33.8 (*)     RDW 18.0 (*)     Lymphocytes Absolute 0.4 (*)     All other components within normal limits   COMPREHENSIVE METABOLIC PANEL W/ REFLEX TO MG FOR LOW K - Abnormal; Notable for the following components:    Chloride 94 (*)     Glucose 246 (*)     BUN 82 (*)     CREATININE 1.8 (*)     GFR Non- 38 (*)     GFR  46 (*)     ALT 7 (*)     AST 14 (*)     All other components within normal limits    Narrative:     Britta Raskwaku tel. [phone],  Chemistry results called to and read back by Andrew Mac, 05/28/2022  23:42, by GENA       All other labs were within normal range or not returned as of this dictation. EKG: All EKG's are interpreted by the Emergency Department Physician who either signs orCo-signs this chart in the absence of a cardiologist.  Please see their note for interpretation of EKG.       RADIOLOGY:   Non-plain film images such as CT, Ultrasound and MRI are read by the radiologist. Plain radiographic images are visualized andpreliminarily interpreted by the  ED Provider with the below findings:        Interpretation perthe Radiologist below, if available at the time of this note:    CT Cervical Spine WO Contrast   Final Result   1. No acute abnormality of the cervical spine. 2. Degenerative changes predominating mid cervical spine with mild acquired   cervical canal stenosis C4-5 and C5-6 as result of posterior disc osteophyte   complex. 3. Nonspecific left mastoid effusion, nearly always benign, reactive, however   mastoiditis cannot be excluded based on CT appearance. Correlate with   history, presentation and physical findings. 4. 2 cm incidental right thyroid nodule. Recommend thyroid US. Reference: J   Am Adriana Radiol. 2015 Feb;12(2): 143-50      RECOMMENDATIONS:   2 cm incidental right thyroid nodule. Recommend thyroid US. Reference: J Am Adriana Radiol. 2015 Feb;12(2): 143-50         CT Head WO Contrast   Final Result   1. Acute small subdural hemorrhage along the right tentorium measuring 4 mm   in thickness. Findings were discussed with Dr. Medhat Dutton at 10:42pm on 5/28/2022. No results found. PROCEDURES   Unless otherwise noted below, none            CRITICAL CARE TIME   N/A    CONSULTS:  IP CONSULT TO NEUROSURGERY      EMERGENCY DEPARTMENT COURSE and DIFFERENTIALDIAGNOSIS/MDM:   Vitals:      Patient was given thefollowing medications:  Medications   oxyCODONE-acetaminophen (PERCOCET) 5-325 MG per tablet 1 tablet (1 tablet Oral Given 5/29/22 0151)       PDMP Monitoring:      Last Controlled Substance Monitoring Documentation        Office Visit from 3/28/2022 in [facility]   Periodic Controlled Substance Monitoring Possible medication side effects, risk of tolerance/dependence & alternative treatments discussed., No signs of potential drug abuse or diversion identified. , Assessed functional status., Obtaining appropriate analgesic effect of treatment.  filed at 03/28/2022 1530          Urine Drug Screenings (1 yr)       Drug screen multi urine  Collected: 5/1/2022  9:53 PM (Final result)      Complete Results                  Medication Contract and Consent for Opioid Use Documents Filed       Patient Documents       Type of Document Status Date Received Received By Description    Medication Contract Received 3/16/2018 11:16 AM CESARIO DOBSON MED CONTRACT    Medication Contract Received 6/8/2018  4:17 PM Shantel Taylor stephanie med contract    Medication Contract Received 10/15/2019  1:53 PM Luci LEMA                    MDM:   This patient was seen and evaluated by myself and my attending physician. He presents to the emergency department this evening for evaluation status post fall from a standing position. On exam he is alert and oriented hemodynamically stable and nontoxic in appearance. He does exhibit hemotympanum of the left ear without any mastoid tenderness bilaterally. He will have a work-up in the emergency department consisting of imaging. CT cervical spine without contrast interpreted by the radiologist for no acute abnormality of the cervical spine. Degenerative changes predominating mid cervical spine with mild acquired cervical canal stenosis C4-C5 and C5-C6 as result of posterior disc osteophyte complex. Nonspecific left mastoid effusion, nearly was benign, reactive, however mastoiditis cannot be excluded based on CT appearance. Correlate with history presentation and physical findings. 2 cm incidental right thyroid nodule. Recommend thyroid ultrasound. CT head without contrast interpreted by the radiologist as acute small subdural hemorrhage along the right tentorium measuring 4 mm in thickness. Findings discussed with Dr. Christelle Briseno at 10:42 PM.  Care of the patient transition to Dr. Christelle Briseno at this time. He will determine final disposition and treatment plan. Laboratory studies have been added on at this time.   I did speak with nursing staff he verified that that oxygen saturations of 85% and 87% were documented erroneously. Patient has not been hypoxic on home oxygen requirement. Is this patient to be included in the SEP-1 Core Measure due to severe sepsis or septic shock? No   Exclusion criteria - the patient is NOT to be included for SEP-1 Core Measure due to: Infection is not suspected    Discharge Time out:  CC Reviewed Yes   Test Results Yes     Vitals:    05/29/22 0133   BP: 139/64   Pulse: 79   Resp:    Temp:    SpO2: 93%              FINAL IMPRESSION      1. Subdural hematoma ([facility] [address]    2. Fall, initial encounter          DISPOSITION/PLAN   DISPOSITION Decision To Transfer 05/28/2022 10:49:20 PM      PATIENT REFERREDTO:  No follow-up provider specified.     DISCHARGE MEDICATIONS:  Discharge Medication List as of 5/29/2022  2:50 AM          DISCONTINUED MEDICATIONS:  Discharge Medication List as of 5/29/2022  2:50 AM                 (Please note that portions ofthis note were completed with a voice recognition program.  Efforts were made to edit the dictations but occasionally words are mis-transcribed.)    ourPATRICK Temple CNP (electronically signed)         [facility] HospitalPATRICK Temple CNP  06/29/22 4253

## 2022-05-29 NOTE — PROGRESS NOTES
Speech Language Pathology  Facility/Department: [facility] 5T ORTHO/NEURO   CLINICAL BEDSIDE SWALLOW EVALUATION  Speech, language, cognitive screen  Discharge     NAME: Kamla Adames  : 1955  MRN: [id number]    ADMISSION DATE: 2022  ADMITTING DIAGNOSIS: has DM (diabetes mellitus) ([address] Coronary artery disease involving native coronary artery of native heart without angina pectoris; Anemia of chronic disease; Essential hypertension; Hyperkalemia; Chronic diastolic heart failure ([address] Pulmonary hypertension due to left ventricular diastolic dysfunction ([address] Morbid obesity ([address] S/P CABG x 3; DM2 (diabetes mellitus, type 2) ([address] Morbid obesity with BMI of 50.0-59.9, adult ([address] HLD (hyperlipidemia); Cardiomyopathy ([address] Productive cough; Chronic pain; Severe obstructive sleep apnea; Obesity, Class III, BMI 40-49.9 (morbid obesity) ([address] Acute diastolic congestive heart failure ([address] Degeneration of lumbar or lumbosacral intervertebral disc; Displacement of lumbar intervertebral disc without myelopathy; Lumbosacral spondylosis without myelopathy; Lumbar facet arthropathy; Disc displacement, lumbar; Spinal stenosis of lumbar region; Mixed conductive and sensorineural hearing loss of left ear with restricted hearing of right ear; Tympanic membrane perforation, left; Chest pain; Cor pulmonale, chronic ([address] Pulmonary hypertension due to alveolar hypoventilation disorder ([address] Nonrheumatic aortic valve stenosis; Chronic neck pain; Dyspnea; Acute diastolic CHF (congestive heart failure) ([address] Cervical stenosis of spinal canal; Degeneration of cervical intervertebral disc; Cervical radiculitis; Acute on chronic diastolic CHF (congestive heart failure) ([address] Acute respiratory failure with hypoxia (HCC); CKD (chronic kidney disease); PAF (paroxysmal atrial fibrillation) ([address] Acute diastolic HF (heart failure) ([address]  Subdural hematoma (HCC); and SDH (subdural hematoma) ([facility]) on their problem list.  ONSET DATE: 5/29/22    Recent Chest Xray  Not performed this visit     CT of head 5/29/22  Impression   1.  Subdural hematoma along the right tentorium, measuring up to 6    mm in thickness.       An addendum can be issued if the prior study could be provided for    comparison             Date of Eval: 5/29/2022  Evaluating Therapist: [facility] [place]    Current Diet level:  Current Diet : NPO      Primary Complaint  Patient Complaint: pt c/o being tired    Pain:  Pt c/o rib pain- RN present and aware     Reason for Referral  Karsten Ramirez was referred for a bedside swallow evaluation to assess the efficiency of his swallow function, identify signs and symptoms of aspiration and make recommendations regarding safe dietary consistencies, effective compensatory strategies, and safe eating environment. HPI  Patient reports he was trying to clean out his left ear so he put his ear under the faucet and let water run and then he became very dizzy and could not grab a hold of anything and fell to the ground striking the right side of his head. He is here because of dizziness and head injury. On exam he does have abrasion and contusion of the right occipital scalp. He is neurologically intact otherwise. No C-spine tenderness. Impression  Pt was alert and oriented x3. Speech is clear with no instances of word finding difficulty. Pt able to follow commands and respond to questions appropriately. ROM of oral structures was [place] [place]. Pt had no difficulty closing lips around spoon or drawing liquid up a straw. Pt had mild difficulty with mastication with solids d/t lack of lower dentition. There was no anterior spillage or oral residue noted with any consistency. Pt demonstrated no s/s aspiration with any consistency presented. Pt able to initiate swallow without difficulty with laryngeal movement observed. Vocal quality remained clear throughout.   No coughing, throat clearing or choking observed with any consistency. Pt consumed 3oz water uninterrupted by cup without difficulty. Dysphagia Diagnosis: Mild oral stage dysphagia  Dysphagia Outcome Severity Scale: Level 5: Mild dysphagia- Distant supervision. May need one diet consistency restricted     Treatment Plan  Requires SLP Intervention: No     D/C Recommendations: No follow up therapy recommended post discharge       Recommended Diet and Intervention   recommend Regular diet with thin liquids  Recommended Form of Meds: PO          Compensatory Swallowing Strategies  Compensatory Swallowing Strategies : Upright as possible for all oral intake    Treatment/Goals   n/a    General  Chart Reviewed: Yes  Behavior/Cognition: Alert; Cooperative;Pleasant mood  Respiratory Status: O2 via nasual cannula  O2 Device: Nasal cannula  Communication Observation: Functional  Follows Directions: Complex  Dentition:  (no lower dentition)  Patient Positioning: Upright in bed  Baseline Vocal Quality: Normal  Volitional Cough: Strong  Prior Dysphagia History: no history of dysphagia         Vision/Hearing  Vision  Vision: Within Functional Limits  Hearing  Hearing: Within functional limits      Prognosis  Individuals consulted  Consulted and agree with results and recommendations: Patient;SIGRID  RN Name: Kendra Bedolla    Education  Patient Education: Role of SLP  Patient Education Response: Verbalizes understanding  Safety Devices in place: Yes  Type of devices: Call light within reach       Therapy Time  SLP Individual Minutes  Time In: 6955  Time Out: 1050  Minutes: 12        Pt's goal: pt denied difficulty with communication and swallowing so did not state goal       Plan:  Communication and swallowing appear to be [facility].  Pt discharged from [address] diet:regular with thin liquids   Total treatment time:12  Pt's discharge plan:to home   Discharge Plan: No further follow up indicated unless there is a change in status, then please re-refer   Discussed with Kendra MATTA

## 2022-05-30 VITALS
HEIGHT: 69 IN | DIASTOLIC BLOOD PRESSURE: 55 MMHG | TEMPERATURE: 97.5 F | WEIGHT: 305.56 LBS | BODY MASS INDEX: 45.26 KG/M2 | RESPIRATION RATE: 18 BRPM | SYSTOLIC BLOOD PRESSURE: 108 MMHG | OXYGEN SATURATION: 92 % | HEART RATE: 85 BPM

## 2022-05-30 LAB
ANION GAP SERPL CALCULATED.3IONS-SCNC: 14 MMOL/L (ref 3–16)
BUN BLDV-MCNC: 84 MG/DL (ref 7–20)
CALCIUM SERPL-MCNC: 9.6 MG/DL (ref 8.3–10.6)
CHLORIDE BLD-SCNC: 90 MMOL/L (ref 99–110)
CO2: 30 MMOL/L (ref 21–32)
CREAT SERPL-MCNC: 2 MG/DL (ref 0.8–1.3)
GFR AFRICAN AMERICAN: 40
GFR NON-AFRICAN AMERICAN: 33
GLUCOSE BLD-MCNC: 281 MG/DL (ref 70–99)
GLUCOSE BLD-MCNC: 288 MG/DL (ref 70–99)
GLUCOSE BLD-MCNC: 291 MG/DL (ref 70–99)
GLUCOSE BLD-MCNC: 377 MG/DL (ref 70–99)
HCT VFR BLD CALC: 32.6 % (ref 40.5–52.5)
HEMOGLOBIN: 10.4 G/DL (ref 13.5–17.5)
MCH RBC QN AUTO: 26.6 PG (ref 26–34)
MCHC RBC AUTO-ENTMCNC: 31.8 G/DL (ref 31–36)
MCV RBC AUTO: 83.6 FL (ref 80–100)
PDW BLD-RTO: 18 % (ref 12.4–15.4)
PERFORMED ON: ABNORMAL
PLATELET # BLD: 196 K/UL (ref 135–450)
PMV BLD AUTO: 9.8 FL (ref 5–10.5)
POTASSIUM SERPL-SCNC: 3.8 MMOL/L (ref 3.5–5.1)
RBC # BLD: 3.9 M/UL (ref 4.2–5.9)
SARS-COV-2, NAAT: NOT DETECTED
SODIUM BLD-SCNC: 134 MMOL/L (ref 136–145)
WBC # BLD: 6.7 K/UL (ref 4–11)

## 2022-05-30 PROCEDURE — G0378 HOSPITAL OBSERVATION PER HR: HCPCS

## 2022-05-30 PROCEDURE — 80048 BASIC METABOLIC PNL TOTAL CA: CPT

## 2022-05-30 PROCEDURE — 85027 COMPLETE CBC AUTOMATED: CPT

## 2022-05-30 PROCEDURE — 87635 SARS-COV-2 COVID-19 AMP PRB: CPT

## 2022-05-30 PROCEDURE — 6370000000 HC RX 637 (ALT 250 FOR IP): Performed by: INTERNAL MEDICINE

## 2022-05-30 PROCEDURE — 36415 COLL VENOUS BLD VENIPUNCTURE: CPT

## 2022-05-30 RX ORDER — TORSEMIDE 100 MG/1
100 TABLET ORAL DAILY
Status: DISCONTINUED | OUTPATIENT
Start: 2022-05-30 | End: 2022-05-30 | Stop reason: HOSPADM

## 2022-05-30 RX ORDER — PREGABALIN 25 MG/1
25 CAPSULE ORAL ONCE
Status: COMPLETED | OUTPATIENT
Start: 2022-05-30 | End: 2022-05-30

## 2022-05-30 RX ORDER — METOLAZONE 2.5 MG/1
5 TABLET ORAL WEEKLY
Status: CANCELLED | OUTPATIENT
Start: 2022-05-30

## 2022-05-30 RX ORDER — PREGABALIN 75 MG/1
75 CAPSULE ORAL ONCE
Status: DISCONTINUED | OUTPATIENT
Start: 2022-05-30 | End: 2022-05-30 | Stop reason: HOSPADM

## 2022-05-30 RX ADMIN — PANTOPRAZOLE SODIUM 40 MG: 40 TABLET, DELAYED RELEASE ORAL at 06:43

## 2022-05-30 RX ADMIN — ISOSORBIDE MONONITRATE 30 MG: 30 TABLET, EXTENDED RELEASE ORAL at 09:10

## 2022-05-30 RX ADMIN — CARVEDILOL 3.12 MG: 3.12 TABLET, FILM COATED ORAL at 09:10

## 2022-05-30 RX ADMIN — INSULIN LISPRO 3 UNITS: 100 INJECTION, SOLUTION INTRAVENOUS; SUBCUTANEOUS at 12:13

## 2022-05-30 RX ADMIN — NIMODIPINE 60 MG: 30 CAPSULE, LIQUID FILLED ORAL at 05:59

## 2022-05-30 RX ADMIN — NIMODIPINE 60 MG: 30 CAPSULE, LIQUID FILLED ORAL at 10:01

## 2022-05-30 RX ADMIN — DOCUSATE SODIUM 100 MG: 100 CAPSULE, LIQUID FILLED ORAL at 09:10

## 2022-05-30 RX ADMIN — OXYCODONE AND ACETAMINOPHEN 2 TABLET: 5; 325 TABLET ORAL at 06:43

## 2022-05-30 RX ADMIN — INSULIN LISPRO 3 UNITS: 100 INJECTION, SOLUTION INTRAVENOUS; SUBCUTANEOUS at 08:00

## 2022-05-30 RX ADMIN — PREGABALIN 25 MG: 25 CAPSULE ORAL at 02:34

## 2022-05-30 RX ADMIN — OXYCODONE AND ACETAMINOPHEN 2 TABLET: 5; 325 TABLET ORAL at 13:48

## 2022-05-30 RX ADMIN — TORSEMIDE 100 MG: 100 TABLET ORAL at 11:03

## 2022-05-30 RX ADMIN — NIMODIPINE 60 MG: 30 CAPSULE, LIQUID FILLED ORAL at 01:39

## 2022-05-30 RX ADMIN — NIMODIPINE 60 MG: 30 CAPSULE, LIQUID FILLED ORAL at 13:48

## 2022-05-30 ASSESSMENT — PAIN SCALES - GENERAL
PAINLEVEL_OUTOF10: 7
PAINLEVEL_OUTOF10: 9

## 2022-05-30 ASSESSMENT — PAIN DESCRIPTION - LOCATION
LOCATION: RIB CAGE
LOCATION: RIB CAGE

## 2022-05-30 ASSESSMENT — PAIN - FUNCTIONAL ASSESSMENT: PAIN_FUNCTIONAL_ASSESSMENT: ACTIVITIES ARE NOT PREVENTED

## 2022-05-30 ASSESSMENT — PAIN DESCRIPTION - ORIENTATION
ORIENTATION: RIGHT
ORIENTATION: RIGHT

## 2022-05-30 ASSESSMENT — PAIN DESCRIPTION - DESCRIPTORS
DESCRIPTORS: ACHING
DESCRIPTORS: ACHING

## 2022-05-30 NOTE — CARE COORDINATION
MIGUEL ANGEL is following Pt today, Monday, and received DC orders for Pt to return to SNF today. Pt is from [facility]. MIGUEL ANGEL called [facility] at [phone] and spoke with Nurse Manager who stated due to the [place], they do not have the support staff to accept Pt today. MIGUEL ANGEL asked for Admissions phone # but Nurse Manager would not provide # and stated she would call Admissions now and converse with her and then call MIGUEL ANGEL back. MIGUEL ANGEL will follow and advise. Rima Roe [place]  Case Management  [phone]      1248-Addendum  Admissions from [facility] called MIGUEL ANGEL back and stated Pt is from their long term care and can return this evening but needs a rapid COVID before returning - order placed. MIGUEL ANGEL arranged a 4PM run with [facility] and faxed [address] [place]. Staff RN aware of DC plan and to call report to [phone].      Rima Roe [place]  Case Management

## 2022-05-30 NOTE — PROGRESS NOTES
Endy note    No new complaints. Pt. Wants to go home. Exam unchanged from yesterday. Repeat imaging was stable. No anticoagulation x 2 weeks. Neurosurgery will sign off. Follow up recommendations in prior notes.      Eddie Barron MD

## 2022-05-30 NOTE — FLOWSHEET NOTE
Patient discharged via ems transport with belongings that considered of black shorts and a white t shirt. Iv removed and tele removed. VSS and neuro stable at discharge.

## 2022-05-30 NOTE — DISCHARGE SUMMARY
Hospital Medicine Discharge Summary    Patient: Kit Brantley     Gender: male  : 1955   Age: 75 y.o. MRN: [id number]    Admitting Physician: Hernandez Rao MD  Discharge Physician: Emory Lyons MD     Code Status: DNR-CCA     Admit Date: 2022   Discharge Date:   22    Disposition:  [facility]    Discharge Diagnoses: Active Hospital Problems    Diagnosis Date Noted    [facility] hematoma ([facility] [S06.5X9A] 2022     Priority: Medium    SDH (subdural hematoma) ([facility] [S06.5X9A] 2022     Priority: Medium    Chronic respiratory failure with hypoxia [facility]) [J96.11] 2022     Priority: Medium    PAF (paroxysmal atrial fibrillation) ([facility]) [I48.0]     Cor pulmonale, chronic ([facility]) [I27.81]     DM2 (diabetes mellitus, type 2) ([facility] [E11.9] 2015    Morbid obesity ([facility] [E66.01] 2013    Chronic diastolic heart failure (HCC) [I50.32] 2013    CKD (chronic kidney disease) stage 3, GFR 30-59 ml/min ([facility]) [N18.30] 2012    Essential hypertension [I10] 2012    Coronary artery disease involving native coronary artery of native heart without angina pectoris [I25.10] 2012       Follow-up appointments:  one week    Outpatient to do list: F/U with PCP and NS    Condition at Discharge:  [address] Course:    75 y.o. male with history of CAD, DM 2, morbid obesity, chronic respiratory failure with hypoxia on 3 L NC, chronic cor pulmonale/diastolic CHF was sent from [facility] to [place] [facility] with dizziness and fall with R head trauma. Patient with R occipital scalp contusion. Found to have small acute SDH in R tentorium on CT Head. Patient on ASA. No CP, SOB, abdominal pain, palpitations, fevers, chills, NS, nausea, vomiting, dysphagia, change in speech, change in vision, cough or pleurisy. Transferred to Neuro ICU for SDH at [facility]. Placed in observation. Seen by NS. Repeat CT Head stable.   No NSAIDS or anticoagulation for 2 weeks. Seen by PT/OT. Will go back to [facility] [place]. F/U with PCP and NS. Discharge Medications:   Current Discharge Medication List      START taking these medications    Details   oxyCODONE-acetaminophen (PERCOCET) 5-325 MG per tablet Take 1 tablet by mouth every 6 hours as needed for Pain for up to 3 days. Qty: 12 tablet, Refills: 0    Comments: Reduce doses taken as pain becomes manageable  Associated Diagnoses: Chronic pain syndrome           Current Discharge Medication List      CONTINUE these medications which have CHANGED    Details   pregabalin (LYRICA) 25 MG capsule Take 1 capsule by mouth 2 times daily for 3 days.   Qty: 6 capsule, Refills: 0    Associated Diagnoses: Chronic pain syndrome           Current Discharge Medication List      CONTINUE these medications which have NOT CHANGED    Details   ondansetron (ZOFRAN-ODT) 4 MG disintegrating tablet       RHOPRESSA 0.02 % SOLN       dorzolamide (TRUSOPT) 2 % ophthalmic solution       brimonidine-timolol (COMBIGAN) 0.2-0.5 % ophthalmic solution       metOLazone (ZAROXOLYN) 5 MG tablet Take 1 tablet by mouth once a week On Fridays  Qty: 12 tablet, Refills: 0      isosorbide mononitrate (IMDUR) 30 MG extended release tablet Take 1 tablet by mouth daily  Qty: 30 tablet, Refills: 1      insulin glargine (LANTUS SOLOSTAR) 100 UNIT/ML injection pen Inject 35 Units into the skin daily  Qty: 5 pen, Refills: 3      carvedilol (COREG) 3.125 MG tablet Take 1 tablet by mouth 2 times daily (with meals)  Qty: 60 tablet, Refills: 3      insulin lispro (HUMALOG) 100 UNIT/ML injection vial Inject 10 Units into the skin 3 times daily (with meals)  Qty: 10 mL, Refills: 0      torsemide (DEMADEX) 100 MG tablet Take 1 tablet by mouth daily  Qty: 30 tablet, Refills: 3      linaclotide (LINZESS) 290 MCG CAPS capsule Take 1 capsule by mouth every morning (before breakfast)  Qty: 30 capsule, Refills: 1      tamsulosin (FLOMAX) 0.4 MG capsule Take 0.4 mg by mouth Daily      atorvastatin (LIPITOR) 80 MG tablet Take 1 tablet by mouth nightly  Qty: 30 tablet, Refills: 3      allopurinol (ZYLOPRIM) 300 MG tablet Take 1 tablet by mouth daily  Qty: 30 tablet, Refills: 0      naloxone 4 MG/0.1ML LIQD nasal spray 1 spray by Nasal route as needed for Opioid Reversal  Qty: 1 each, Refills: 0      acetaminophen (TYLENOL) 500 MG tablet Take 2 tablets by mouth 4 times daily as needed for Pain  Qty: 60 tablet, Refills: 0      senna (SENOKOT) 8.6 MG TABS tablet Take 1 tablet by mouth 2 times daily  Qty: 120 tablet, Refills: 0      docusate sodium (COLACE, DULCOLAX) 100 MG CAPS Take 100 mg by mouth 2 times daily      Compression Stockings MISC by Does not apply route 2 pair, knee high compression stockings, fitted/ zippered  Qty: 2 each, Refills: 1    Associated Diagnoses: Chronic systolic CHF (congestive heart failure) ([facility] Coronary artery disease involving native coronary artery of native heart without angina pectoris; Essential hypertension; CKD (chronic kidney disease) stage 3, GFR 30-59 ml/min ([facility] Type 2 diabetes mellitus with stage 3 chronic kidney disease, with long-term current use of insulin ([facility])      silver sulfADIAZINE (SILVADENE) 1 % cream Apply to BL lower leg ulcers daily  Qty: 20 g, Refills: 0      nitroGLYCERIN (NITROSTAT) 0.4 MG SL tablet Place 1 tablet under the tongue every 5 minutes as needed  Qty: 25 tablet, Refills: 1      fluticasone (FLONASE) 50 MCG/ACT nasal spray 1 spray by Nasal route nightly as needed. ferrous sulfate 325 (65 FE) MG tablet Take 325 mg by mouth 2 times daily       omeprazole (PRILOSEC) 20 MG capsule Take 20 mg by mouth 2 times daily.            Current Discharge Medication List      STOP taking these medications       aspirin 81 MG chewable tablet Comments:   Reason for Stopping:                 Discharge Exam:    BP (!) 108/55   Pulse 85   Temp 97.5 °F (36.4 °C) (Oral)   Resp 18   Ht 5' 9\" (1.753 m)   Wt (!) 305 lb 8.9 oz (138.6 kg)   SpO2 92%   BMI 45.12 kg/m²   General appearance:  Appears comfortable. Well nourished, obese, pleasant  Eyes: Sclera clear, pupils equal  ENT: Moist mucus membranes, no thrush. Trachea midline. Cardiovascular: Regular rhythm, normal S1, S2. No murmur, gallop, rub. Lymphedema in BL lower extremities  Respiratory: Clear to auscultation bilaterally, no wheeze, good inspiratory effort  Gastrointestinal: Abdomen soft, obese, non-tender, not distended, normal bowel sounds  Musculoskeletal: No cyanosis in digits, neck supple  Neurology: Cranial nerves grossly intact. Alert and oriented in time, place and person. No speech or motor deficits  Psychiatry: Appropriate affect. Not agitated  Skin: Warm, dry, normal turgor, no rash  Brisk capillary refill, peripheral pulses palpable     Labs: For convenience and continuity at follow-up the following most recent labs are provided:    Lab Results   Component Value Date    WBC 6.7 05/30/2022    HGB 10.4 05/30/2022    HCT 32.6 05/30/2022    MCV 83.6 05/30/2022     05/30/2022     05/30/2022    K 3.8 05/30/2022    K 3.8 05/28/2022    CL 90 05/30/2022    CO2 30 05/30/2022    BUN 84 05/30/2022    CREATININE 2.0 05/30/2022    CALCIUM 9.6 05/30/2022    PHOS 3.4 05/03/2022    .0 11/23/2016    ALKPHOS 102 05/28/2022    ALT 7 05/28/2022    AST 14 05/28/2022    BILITOT 0.5 05/28/2022    BILIDIR 0.09 09/21/2011    LABALBU 3.9 05/28/2022    LDLCALC 65 04/01/2022    TRIG 92 04/01/2022     Lab Results   Component Value Date    INR 1.29 (H) 03/31/2022    INR 1.22 (H) 09/17/2015    INR 1.18 (H) 11/24/2014       Radiology:  CT HEAD WO CONTRAST    Result Date: 5/29/2022  Patient: Bennett Officer  Time Out: 06:26 Exam(s): CT HEAD Without Contrast  EXAM:   CT Head Without Intravenous Contrast  CLINICAL HISTORY:   Fall. SDH, follow up study.   TECHNIQUE:   Axial computed tomography images of the head/brain without intravenous contrast.  All CT scans at this facility use dose modulation, iterative reconstruction, and/or weight based dosing when appropriate to reduce radiation dose to as low as reasonably achievable. COMPARISON: The prior report is available. The prior CT images are not available for comparison  FINDINGS:   Brain:  There is subdural hematoma along the right tentorium, measuring up to 6 mm in thickness. Mild chronic periventricular ischemic demyelination changes seen due to small vessel disease. Ventricles:  Unremarkable. No ventriculomegaly. Bones/joints:  Unremarkable. No acute fracture. Soft tissues:  Unremarkable. Sinuses:  Unremarkable as visualized. No acute sinusitis. Mastoid air cells:  Unremarkable as visualized. No mastoid effusion. Electronically signed by Jenniffer Wright MD on [id number] at 1030    1. Subdural hematoma along the right tentorium, measuring up to 6 mm in thickness. An addendum can be issued if the prior study could be provided for comparison      CT Head WO Contrast    Result Date: 5/28/2022  EXAMINATION: CT OF THE HEAD WITHOUT CONTRAST  5/28/2022 9:35 pm TECHNIQUE: CT of the head was performed without the administration of intravenous contrast. Automated exposure control, iterative reconstruction, and/or weight based adjustment of the mA/kV was utilized to reduce the radiation dose to as low as reasonably achievable. COMPARISON: 09/17/2020 HISTORY: ORDERING SYSTEM PROVIDED HISTORY: Fall from standing. TECHNOLOGIST PROVIDED HISTORY: Reason for exam:->Fall from standing. Has a \"code stroke\" or \"stroke alert\" been called? ->No Decision Support Exception - unselect if not a suspected or confirmed emergency medical condition->Emergency Medical Condition (MA) Reason for Exam: fall, head injury, no LOC. FINDINGS: BRAIN/VENTRICLES: Acute thin subdural hemorrhage is present along the right tentorium measuring 4 mm in thickness. No additional acute intracranial hemorrhage. No midline shift or mass effect. No acute hydrocephalus.   Basilar cisterns are maintained. No evidence of acute intracranial infarct. Generalized brain atrophy. ORBITS: The visualized portion of the orbits demonstrate no acute abnormality. SINUSES: Partial opacification of several left mastoid air cells. Paranasal sinuses are otherwise clear. SOFT TISSUES/SKULL:  No acute abnormality of the visualized skull or soft tissues. 1. Acute small subdural hemorrhage along the right tentorium measuring 4 mm in thickness. Findings were discussed with Dr. Maddie Patrick at 10:42pm on 5/28/2022. CT Cervical Spine WO Contrast    Result Date: 5/28/2022  EXAMINATION: CT OF THE CERVICAL SPINE WITHOUT CONTRAST 5/28/2022 9:35 pm TECHNIQUE: CT of the cervical spine was performed without the administration of intravenous contrast. Multiplanar reformatted images are provided for review. Automated exposure control, iterative reconstruction, and/or weight based adjustment of the mA/kV was utilized to reduce the radiation dose to as low as reasonably achievable. COMPARISON: None. HISTORY: ORDERING SYSTEM PROVIDED HISTORY: fall from standing TECHNOLOGIST PROVIDED HISTORY: Reason for exam:->fall from standing Decision Support Exception - unselect if not a suspected or confirmed emergency medical condition->Emergency Medical Condition (MA) Reason for Exam: fall, neck pain FINDINGS: BONES/ALIGNMENT: There is no acute fracture or traumatic malalignment. DEGENERATIVE CHANGES: Diffuse mild-to-moderate degenerative changes. Posterior disc osteophyte complex predominates C4-5 and C5-6 with mild canal narrowing at these levels measuring 10 mm AP dimension. SOFT TISSUES: There is no prevertebral soft tissue swelling. Inferior medial right thyroid lobe hypoattenuating nodule is 2 cm axial series 603, image 67. LOWER HEAD: Unremarkable intracranial appearance. Nonspecific left mastoid effusion. 1. No acute abnormality of the cervical spine.  2. Degenerative changes predominating mid cervical spine with mild acquired cervical canal stenosis C4-5 and C5-6 as result of posterior disc osteophyte complex. 3. Nonspecific left mastoid effusion, nearly always benign, reactive, however mastoiditis cannot be excluded based on CT appearance. Correlate with history, presentation and physical findings. 4. 2 cm incidental right thyroid nodule. Recommend thyroid US. RECOMMENDATIONS: 2 cm incidental right thyroid nodule. Recommend thyroid US. Reference: J Am Adriana Radiol. 2015 Feb;12(2): 143-50         The patient was seen and examined on day of discharge and this discharge summary is in conjunction with any daily progress note from day of discharge. Time Spent on discharge is 45 minutes  in the examination, evaluation, counseling and review of medications and discharge plan. Note that more than 30 minutes was spent in preparing discharge papers, discussing discharge with patient, medication review, etc.       Signed:    Brandi Duggan MD   5/30/2022      Thank you No primary care provider on file. for the opportunity to be involved in this patient's care.  If you have any questions or concerns please feel free to contact me at [facility]

## 2022-05-30 NOTE — PROGRESS NOTES
Clinical Pharmacy Progress Note     All IVs in Normal Saline - Management by Pharmacy    Consult Date(s): 5/29/22  Consulting Provider(s): Dr. Gardner [facility]     Neurologic Injury (SDH) - All IVs in Normal Saline   Drips will be adjusted to normal saline as appropriate based on compatibility, in an effort to avoid fluid shifts, as D5W is osmotically active.  The following intermittent IV drips / infusions have been adjusted to saline:  o None at this time   Note: Patient has D5W ordered as part of hypoglycemia orderset.  Total IV fluid delivered to patient over last 24 hrs: 0 mL   RPh will follow daily to ensure all IVPBs / drips are in NS where possible. Thanks for consulting pharmacy!   Jess Rossi, PharmD  PGY-1 Pharmacy Resident  [id number]  5/30/2022 11:50 AM

## 2022-05-30 NOTE — PROGRESS NOTES
Pt alert and oriented x4, ambulated using walker and gait belt with staff to the bathroom, walked in room to stretch few times. Complained of pain to R flank stating that 'it is nerve pain I take lyrica for BID\". There was no lyrica ordered. Percocet given and on call Hospitalist notified, telephone ordred noted to give 25 mg once. Pt verbalized pain was controlled after receiving med. VSS. Had BM, adequate UO. BLE in wrap with ACE wrap. Pt educated to ralk deep breath frequently to keep O2 up;  Pt understands. Pt stayed up in chair throughout the shift stating that his side hurts when he lying in bed. No unmet needs at this time. Chair alarm on, wheels locked, call light within reach. Will monitor.

## 2022-05-30 NOTE — PROGRESS NOTES
Call received from [facility] regarding this pt called them telling he that he was waiting on taxi to be on his way to the facility. They wanted to confirm if pt was discharged using taxi for transpiration. Pt stated he wants to go to his place. Pt understood that discharge was postponed until tomorrow.

## 2022-05-31 LAB
ALBUMIN SERPL-MCNC: 3.5 G/DL
ALP BLD-CCNC: 81 U/L
ALT SERPL-CCNC: 7 U/L
ANION GAP SERPL CALCULATED.3IONS-SCNC: NORMAL MMOL/L
AST SERPL-CCNC: 12 U/L
BASOPHILS ABSOLUTE: ABNORMAL
BASOPHILS RELATIVE PERCENT: 0.2 %
BILIRUB SERPL-MCNC: 0.7 MG/DL (ref 0.1–1.4)
BUN BLDV-MCNC: 81 MG/DL
CALCIUM SERPL-MCNC: 8.6 MG/DL
CHLORIDE BLD-SCNC: 94 MMOL/L
CO2: 30 MMOL/L
CREAT SERPL-MCNC: 1.9 MG/DL
EOSINOPHILS ABSOLUTE: 0.1 /ΜL
EOSINOPHILS RELATIVE PERCENT: 1 %
GFR CALCULATED: 35
GLUCOSE BLD-MCNC: 319 MG/DL
HCT VFR BLD CALC: 33.1 % (ref 41–53)
HEMOGLOBIN: 10.4 G/DL (ref 13.5–17.5)
LYMPHOCYTES ABSOLUTE: 0.5 /ΜL
LYMPHOCYTES RELATIVE PERCENT: 6.9 %
MCH RBC QN AUTO: 26.5 PG
MCHC RBC AUTO-ENTMCNC: 31.5 G/DL
MCV RBC AUTO: 84.2 FL
MONOCYTES ABSOLUTE: 0.4 /ΜL
MONOCYTES RELATIVE PERCENT: 5.7 %
NEUTROPHILS ABSOLUTE: 6.4 /ΜL
NEUTROPHILS RELATIVE PERCENT: 86.2 %
PLATELET # BLD: 155 K/ΜL
PMV BLD AUTO: ABNORMAL FL
POTASSIUM SERPL-SCNC: 3.8 MMOL/L
RBC # BLD: 3.93 10^6/ΜL
SODIUM BLD-SCNC: 136 MMOL/L
TOTAL PROTEIN: 6.4
WBC # BLD: 7.4 10^3/ML

## 2022-06-03 NOTE — PROGRESS NOTES
Sb Benitez   1955, 75 y.o. male   [id number]       Referring Provider: Melvin Jones MD  Referral Type: In an order in [address] [place] [address]    Reason for Visit: Re-evaluation of disorder as deemed medically necessary by referring provider    ADULT AUDIOLOGIC EVALUATION      Shay Strong is a 75 y.o. male seen today, 6/6/2022 , for a recheck audiologic evaluation. Patient was seen by Melvin Jones MD following today's evaluation. Previous evaluation from 9/25/2020 viewable in medical record. AUDIOLOGIC AND OTHER PERTINENT MEDICAL HISTORY:      Kamla Adames noted hearing and tinnitus in left ear continue to be worse compared to right. He notes recent fall with head injury 1 month ago-imaging after fall reportedly unremarkable. 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 Medical history is reportedly significant for diabetes, congestive heart failure, and neuropathy. No additional significant otologic or medical history was reported. Sb Benitez denied dizziness, imbalance, history of falls, history of occupational/recreational noise exposure, history of head trauma, history of ear surgery and family history of hearing loss. Date: 6/6/2022     IMPRESSIONS:      Today's results revealed a sensorineural hearing loss in the right ear and mixed conductive and sensorineural hearing los sin the left ear with good word recognition, bilaterally. Air-bone gaps > 10 dBHL noted form 500-4000Hz in the left ear. Hearing essentially stable in right and improved in left compared to previous evaluation. Word recognition decreased, bilaterally.  Follow medical recommendations of Melvin Jones MD.     ASSESSMENT AND FINDINGS:     Otoscopy revealed: Clear ear canals bilaterally    RIGHT EAR:  Hearing Sensitivity: Within normal limits through 1kHz sloping to a mild to moderate sensorineural hearing loss through [facility]. Speech Recognition Threshold: 25 dB HL  Word Recognition: Good (80%), based on NU-6 25-word list at 65 dBHL using recorded speech stimuli. Tympanometry: Normal peak pressure and compliance, Type A tympanogram, consistent with normal middle ear function. LEFT EAR:  Hearing Sensitivity: Mild sloping to severe to profound mixed conductive and sensorineural hearing loss. Speech Recognition Threshold: 55 dB HL  Word Recognition: Good (84%), based on NU-6 25-word list at 100 dBHL masked using recorded speech stimuli. Tympanometry: Flat, no peak pressure or compliance, Type B tympanogram, with large ear canal volume, consistent with patent PE tube/TM perforation. Reliability: Good   Transducer: Inserts (checked with headphones)     **NOTE: Air-bone gaps > 10 dBHL noted form 500-4000Hz in the left ear. Hearing essentially stable in right and improved in left compared to previous evaluation. Word recognition decreased, bilaterally. See scanned audiogram dated 6/6/2022  for results. PATIENT EDUCATION:       The following items were discussed with the patient:   - Good Communication Strategies  - Hearing Loss and Hearing Aids  - Tinnitus Management Strategies      Educational information was shared in the After Visit Summary. RECOMMENDATIONS:                                                                                                                                                                                                                                                            The following items are recommended based on patient report and results from today's appointment:   - Continue medical follow-up with Kurt Betancourt MD.   - Retest hearing as medically indicated and/or sooner if a change in hearing is noted.   - If desired, schedule a Hearing Aid Evaluation (HAE) appointment to discuss hearing aid options. - Utilize \"Good Communication Strategies\" as discussed to assist in speech understanding with communication partners. - Maintain a sound enriched environment to assist in the management of tinnitus symptoms.        Brenda He  Audiologist    Chart CC'd to: Lan Priest MD      Degree of   Hearing Sensitivity dB Range   Within Normal Limits (WNL) 0 - 20   Mild 20 - 40   Moderate 40 - 55   Moderately-Severe 55 - 70   Severe 70 - 90   Profound 90 +

## 2022-06-06 ENCOUNTER — OFFICE VISIT (OUTPATIENT)
Dept: ENT CLINIC | Age: 67
End: 2022-06-06
Payer: MEDICARE

## 2022-06-06 ENCOUNTER — PROCEDURE VISIT (OUTPATIENT)
Dept: AUDIOLOGY | Age: 67
End: 2022-06-06
Payer: MEDICARE

## 2022-06-06 VITALS — BODY MASS INDEX: 43.84 KG/M2 | HEIGHT: 69 IN | TEMPERATURE: 98.3 F | RESPIRATION RATE: 16 BRPM | WEIGHT: 296 LBS

## 2022-06-06 DIAGNOSIS — H92.02 OTALGIA OF LEFT EAR: Primary | ICD-10-CM

## 2022-06-06 DIAGNOSIS — H72.92 TYMPANIC MEMBRANE PERFORATION, LEFT: ICD-10-CM

## 2022-06-06 DIAGNOSIS — H90.A21 SENSORINEURAL HEARING LOSS (SNHL) OF RIGHT EAR WITH RESTRICTED HEARING OF LEFT EAR: Primary | ICD-10-CM

## 2022-06-06 DIAGNOSIS — M26.622 TMJ TENDERNESS, LEFT: ICD-10-CM

## 2022-06-06 DIAGNOSIS — H93.13 TINNITUS, BILATERAL: ICD-10-CM

## 2022-06-06 DIAGNOSIS — H90.A32 MIXED CONDUCTIVE AND SENSORINEURAL HEARING LOSS OF LEFT EAR WITH RESTRICTED HEARING OF RIGHT EAR: ICD-10-CM

## 2022-06-06 DIAGNOSIS — H60.312 ACUTE DIFFUSE OTITIS EXTERNA OF LEFT EAR: Primary | ICD-10-CM

## 2022-06-06 PROCEDURE — 99213 OFFICE O/P EST LOW 20 MIN: CPT | Performed by: OTOLARYNGOLOGY

## 2022-06-06 PROCEDURE — 4130F TOPICAL PREP RX AOE: CPT | Performed by: OTOLARYNGOLOGY

## 2022-06-06 PROCEDURE — G8417 CALC BMI ABV UP PARAM F/U: HCPCS | Performed by: OTOLARYNGOLOGY

## 2022-06-06 PROCEDURE — 92557 COMPREHENSIVE HEARING TEST: CPT | Performed by: AUDIOLOGIST

## 2022-06-06 PROCEDURE — 1036F TOBACCO NON-USER: CPT | Performed by: OTOLARYNGOLOGY

## 2022-06-06 PROCEDURE — 92567 TYMPANOMETRY: CPT | Performed by: AUDIOLOGIST

## 2022-06-06 PROCEDURE — 3017F COLORECTAL CA SCREEN DOC REV: CPT | Performed by: OTOLARYNGOLOGY

## 2022-06-06 PROCEDURE — 1123F ACP DISCUSS/DSCN MKR DOCD: CPT | Performed by: OTOLARYNGOLOGY

## 2022-06-06 PROCEDURE — G8427 DOCREV CUR MEDS BY ELIG CLIN: HCPCS | Performed by: OTOLARYNGOLOGY

## 2022-06-06 RX ORDER — OXYCODONE HYDROCHLORIDE AND ACETAMINOPHEN 5; 325 MG/1; MG/1
1 TABLET ORAL EVERY 6 HOURS PRN
Status: ON HOLD | COMMUNITY
End: 2022-10-25 | Stop reason: SDUPTHER

## 2022-06-06 RX ORDER — LOPERAMIDE HYDROCHLORIDE 2 MG/1
2 CAPSULE ORAL PRN
COMMUNITY

## 2022-06-06 RX ORDER — INSULIN GLARGINE 100 [IU]/ML
10 INJECTION, SOLUTION SUBCUTANEOUS NIGHTLY
COMMUNITY
End: 2022-07-14

## 2022-06-06 RX ORDER — SODIUM PHOSPHATE, DIBASIC AND SODIUM PHOSPHATE, MONOBASIC 7; 19 G/133ML; G/133ML
1 ENEMA RECTAL DAILY PRN
COMMUNITY

## 2022-06-06 RX ORDER — DOXYCYCLINE HYCLATE 100 MG
TABLET ORAL
COMMUNITY
Start: 2022-05-27 | End: 2022-07-14

## 2022-06-06 RX ORDER — CIPROFLOXACIN AND DEXAMETHASONE 3; 1 MG/ML; MG/ML
4 SUSPENSION/ DROPS AURICULAR (OTIC) 2 TIMES DAILY
Qty: 7.5 ML | Refills: 0 | Status: SHIPPED | OUTPATIENT
Start: 2022-06-06 | End: 2022-06-11

## 2022-06-06 NOTE — Clinical Note
Dr. Ximena Marcelo,    Thank you for your referral for audiometric testing on this patient. Please see the scanned audiogram (under \"Media\" tab) and encounter note for details. If you have any questions, or if there is anything else you need, please let me know.       Neri Thacker  Audiologist  ---  [id number] [facility] - Audiology

## 2022-06-06 NOTE — PROGRESS NOTES
prodex  [address] UP VISIT      Patient Name: Sarah Berman  Medical Record Number:  [id number]  Primary Care Physician:  No primary care provider on file. ChiefComplaint     Chief Complaint   Patient presents with    Otalgia     States that he is still having pain in his ear     Cerumen Impaction     States that he thinks he has a lot of wax in his ear    Ear Problem     States that his ear was really hot and he was afraid that he might have another infection       History of Present Illness     Shay Flores is an 75 y.o. male previously seen for left sided headache and drainage with suspected acute otitis media by Dr. Ynes oCok. Interval History 10/5/2020: Since last visit, states significantly improved otalgia without otorrhea; denies headache. Continues to have left-sided aural fullness and left-sided hearing loss. No vertigo, fevers, chills, night sweats. Patient is a diabetic, last A1c 6.5 on 9/20/2019. Interval history 10/15/2020: Since last visit, patient states minimal otorrhea from the left ear and resolution of pruritus. Continues to state poor hearing in the left ear. Intermittent head congestion, has been taking Excedrin which improves this. Interval history 12/15/2020: No interval pruritus, pain, otorrhea. Continues to have poor hearing in the left ear-states he is left-handed, and has difficulty using the telephone in the left ear. Interval history 3/9/2021: Patient with left ear otorrhea/pain that has been ongoing for the past two weeks. Improvement with course of Augmentin, however continued mild pain and drainage. Interval history 4/20/2021: Left ear otorrhea two weeks ago, improved with amoxicillin. No recent drainage. Interval history 9/13/2021: Patient states throbbing pain in the left ear, ongoing for the past 2-3 weeks, with radiation down the mandible and into the temporal scalp. 4-5/10 intensity, no otorrhea or hearing loss    Interval history 6/6/2022: Patient states he has had 2 weeks of left ear pain, no drainage, pain was 8/10 last week however has decreased to 3-4/2010 today. Radiates to the scalp.   No vertigo, but recent mechanical fall with 6 mm right SDH noted on 5/29/2022    Past Medical History     Past Medical History:   Diagnosis Date    Anemia     Angina     Arthritis     CAD (coronary artery disease)     CH[facility]est[facility] failure) [facility]     CKD (chronic kidney disease) stage 3, GFR 30-59 ml/min ([facility])     Clostridium difficile diarrhea 3/16/12; 2/29/12    positive stool toxin    Diabetes mellitus ([address]     Diabetic neuropathy ([facility])     feet and legs    Disease of blood and blood forming organ     Dizziness     When he moves his head/ loses his balance    GERD (gastroesophageal reflux disease)     gastric ulcer    Headache     Hearing loss     Hyperlipidemia     Hypertension     Kidney stone 2002    Refusal of blood transfusions as patient is Confucianism     Sleep apnea     Tinnitus     Venous ulcer ([address]     LLE    Wound, open 1/13/2012       Past Surgical History     Past Surgical History:   Procedure Laterality Date    CARDIAC SURGERY      Dec 27 2010, triple bypass    CHOLECYSTECTOMY  9/2011    HERNIA REPAIR  age2    OTHER SURGICAL HISTORY  11-16-11 REPAIR LOWER STERNAL INCISION, REMOVAL OF ONE STERNAL WIRE,    OTHER SURGICAL HISTORY  10/10/2014    phacoemulsification of cataract with intraocular lens implant left eye    PAIN MANAGEMENT PROCEDURE N/A 2/10/2022    MIDLINE CERVICAL SIX SEVEN EPIDURAL STEROID INJECTION SITE CONFIRMED BY FLUOROSCOPY performed by Stephanie Valadez MD at [facility] OR    UPPER GASTROINTESTINAL ENDOSCOPY         Family History     Family History   Problem Relation Age of Onset    Heart Disease Mother     Heart Disease Father     Cancer Father     Diabetes Brother     Diabetes Brother        Social History Social History     Tobacco Use    Smoking status: Former Smoker     Packs/day: 1.00     Years: 16.00     Pack years: 16.00     Types: Cigarettes     Quit date: 1/10/1988     Years since quittin.4    Smokeless tobacco: Never Used    Tobacco comment: 25 yrs ago   Vaping Use    Vaping Use: Never used   Substance Use Topics    Alcohol use: No     Alcohol/week: 0.0 standard drinks    Drug use: No        Allergies     Allergies   Allergen Reactions    Amitriptyline Other (See Comments)     tremor    Celecoxib      Hyperkalemia    Cymbalta [Duloxetine Hcl] Other (See Comments)     Tremors and slurred speech    Elavil [Amitriptyline Hcl]      tremor    Gabapentin      Tremor at high dose    Nsaids      Gastric ulcer       Medications     Current Outpatient Medications   Medication Sig Dispense Refill    pregabalin (LYRICA) 25 MG capsule Take 1 capsule by mouth 2 times daily for 3 days.  6 capsule 0    ondansetron (ZOFRAN-ODT) 4 MG disintegrating tablet       RHOPRESSA 0.02 % SOLN       dorzolamide (TRUSOPT) 2 % ophthalmic solution       brimonidine-timolol (COMBIGAN) 0.2-0.5 % ophthalmic solution       metOLazone (ZAROXOLYN) 5 MG tablet Take 1 tablet by mouth once a week On  12 tablet 0    isosorbide mononitrate (IMDUR) 30 MG extended release tablet Take 1 tablet by mouth daily (Patient taking differently: Take 30 mg by mouth daily 0.5 tablet once a day) 30 tablet 1    insulin glargine (LANTUS SOLOSTAR) 100 UNIT/ML injection pen Inject 35 Units into the skin daily 5 pen 3    carvedilol (COREG) 3.125 MG tablet Take 1 tablet by mouth 2 times daily (with meals) 60 tablet 3    insulin lispro (HUMALOG) 100 UNIT/ML injection vial Inject 10 Units into the skin 3 times daily (with meals) 10 mL 0    torsemide (DEMADEX) 100 MG tablet Take 1 tablet by mouth daily 30 tablet 3    linaclotide (LINZESS) 290 MCG CAPS capsule Take 1 capsule by mouth every morning (before breakfast) 30 capsule 1    tamsulosin (FLOMAX) 0.4 MG capsule Take 0.4 mg by mouth Daily      atorvastatin (LIPITOR) 80 MG tablet Take 1 tablet by mouth nightly 30 tablet 3    allopurinol (ZYLOPRIM) 300 MG tablet Take 1 tablet by mouth daily 30 tablet 0    naloxone 4 MG/0.1ML LIQD nasal spray 1 spray by Nasal route as needed for Opioid Reversal 1 each 0    acetaminophen (TYLENOL) 500 MG tablet Take 2 tablets by mouth 4 times daily as needed for Pain 60 tablet 0    senna (SENOKOT) 8.6 MG TABS tablet Take 1 tablet by mouth 2 times daily 120 tablet 0    docusate sodium (COLACE, DULCOLAX) 100 MG CAPS Take 100 mg by mouth 2 times daily      Compression Stockings MISC by Does not apply route 2 pair, knee high compression stockings, fitted/ zippered 2 each 1    silver sulfADIAZINE (SILVADENE) 1 % cream Apply to BL lower leg ulcers daily 20 g 0    nitroGLYCERIN (NITROSTAT) 0.4 MG SL tablet Place 1 tablet under the tongue every 5 minutes as needed 25 tablet 1    fluticasone (FLONASE) 50 MCG/ACT nasal spray 1 spray by Nasal route nightly as needed.  ferrous sulfate 325 (65 FE) MG tablet Take 325 mg by mouth 2 times daily       omeprazole (PRILOSEC) 20 MG capsule Take 20 mg by mouth 2 times daily. No current facility-administered medications for this visit.        Review of Systems     REVIEW OF SYSTEMS  The following systems were reviewed and revealed the following in addition to any already discussed in the HPI:    CONSTITUTIONAL: No weight loss, no fever, no night sweats, no chills  EYES: no vision changes, no blurry vision  EARS: no changes in hearing, +otalgia  NOSE: no epistaxis, no rhinorrhea  RESPIRATORY: No difficulty breathing, no shortness of breath  CV: no chest pain, no peripheral vascular disease  HEME: No coagulation disorder, no bleeding disorder  NEURO: No TIA or stroke-like symptoms  SKIN: No new rashes in the head and neck, no recent skin cancers  MOUTH: Delete no new ulcers, no recent teeth daksha-tympanum   * Patient tolerated the procedure well with no complications    Assessment and Plan     1. Mixed conductive and sensorineural hearing loss of left ear with restricted hearing of right ear  Patient with mixed hearing loss of left ear, with significant conductive gap-likely due to left tympanic membrane perforation but may also have an element of ossicular chain discontinuity/fixation. His tympanic membrane remains perforated, and we discussed tympanoplasty with possible ossicular chain reconstruction. Patient states that once he gets his living situation stabilized he will call us if he desires surgery    2. Tympanic membrane perforation, left  Left tympanic membrane, 40-50%, central-no cholesteatoma visualized in the middle ear     3. TMJ arthralgia/tenderness left  Patient with suspected left temporomandibular joint arthralgia-middle ear appears clear, no chronic suppurative otitis media noted, no cholesteatoma. Conservative measures outlined. He may follow-up with us on an as-needed basis. 4. Acute otitis externa left  We will start eardrops for 4-5 days, dry ear precautions outlined    No follow-ups on file. Chase Mcclelland MD  [facility]  Department of Otolaryngology/Head and Neck Surgery  6/6/22    I have performed a head and neck physical exam personally or was physically present during the key or critical portions of the service. Medical Decision Making: The following items were considered in medical decision making:  Independent review of images  Review / order clinical lab tests  Review / order radiology tests  Decision to obtain old records  Review and summation of [facility] records as [facility] (a summary of my findings in these old records: None)     Portions of this note were dictated using Dragon.  There may be linguistic errors secondary to the use of this program.

## 2022-06-06 NOTE — PATIENT INSTRUCTIONS
-Use ear drops as directed - lie with right ear upright during instillation, and stay in this position for 15-20 minutes after placing the ear drops  -Dry ear precautions recommended as follows:   Patient to place either:  1. A silicone ear plug  2.  A cotton ball coated in Vaseline   into both ears during showering, instructed to remove afterwards to aerate ears     Patient instructed to avoid digital trauma to the ears   Instructed to notify the clinic immediately with any acute otalgia, hearing loss, purulent otorrhea

## 2022-06-20 DIAGNOSIS — H92.02 LEFT EAR PAIN: Primary | ICD-10-CM

## 2022-06-20 NOTE — TELEPHONE ENCOUNTER
Patient calling to ask if he can have a refill of ear drops, he has been in a lot of ear pain mostly in left ear. [facility] is at [facility] and would like the ear drops sent there for .      Last office visit 6/6/22

## 2022-06-21 RX ORDER — CIPROFLOXACIN AND DEXAMETHASONE 3; 1 MG/ML; MG/ML
4 SUSPENSION/ DROPS AURICULAR (OTIC) 2 TIMES DAILY
Qty: 7.5 ML | Refills: 0 | Status: SHIPPED | OUTPATIENT
Start: 2022-06-21 | End: 2022-06-26

## 2022-06-21 NOTE — TELEPHONE ENCOUNTER
I spoke with the patient. He is aware that the prescription was sent to [facility]. He states he will contact them to expedite the delivery.

## 2022-06-22 ENCOUNTER — TELEPHONE (OUTPATIENT)
Dept: ENT CLINIC | Age: 67
End: 2022-06-22

## 2022-06-22 NOTE — TELEPHONE ENCOUNTER
The patient called this morning and spoke with [facility]. He stated that he is having trouble getting the Ciprodex from [facility]. He stated that [facility] called him and told him that they needed more information from us. Per Amanda Arteaga LPN, the last prescription needed to be printed and given to [facility] where he resides. I called [facility]  [phone] and left a message for the ADON asking for a call back. I called [facility] and was told that the patients prescription did ship out this morning with an estimated delivery of 3-5 business days. I called and advised the patient that the prescription has been mailed.  I will wait for a phone call back from the ADON to see if there is anyway he an get a few days worth of the drops while he waits for his delivery

## 2022-06-24 ENCOUNTER — TELEPHONE (OUTPATIENT)
Dept: CARDIOLOGY CLINIC | Age: 67
End: 2022-06-24

## 2022-07-08 ENCOUNTER — OFFICE VISIT (OUTPATIENT)
Dept: VASCULAR SURGERY | Age: 67
End: 2022-07-08
Payer: MEDICARE

## 2022-07-08 VITALS
WEIGHT: 296 LBS | HEIGHT: 69 IN | DIASTOLIC BLOOD PRESSURE: 70 MMHG | BODY MASS INDEX: 43.84 KG/M2 | SYSTOLIC BLOOD PRESSURE: 142 MMHG

## 2022-07-08 DIAGNOSIS — I87.2 CHRONIC VENOUS STASIS DERMATITIS OF BOTH LOWER EXTREMITIES: Primary | ICD-10-CM

## 2022-07-08 DIAGNOSIS — E66.01 MORBID OBESITY (HCC): ICD-10-CM

## 2022-07-08 PROCEDURE — 99203 OFFICE O/P NEW LOW 30 MIN: CPT | Performed by: SURGERY

## 2022-07-08 PROCEDURE — G8427 DOCREV CUR MEDS BY ELIG CLIN: HCPCS | Performed by: SURGERY

## 2022-07-08 PROCEDURE — 1123F ACP DISCUSS/DSCN MKR DOCD: CPT | Performed by: SURGERY

## 2022-07-08 PROCEDURE — 3017F COLORECTAL CA SCREEN DOC REV: CPT | Performed by: SURGERY

## 2022-07-08 PROCEDURE — 1036F TOBACCO NON-USER: CPT | Performed by: SURGERY

## 2022-07-08 PROCEDURE — G8417 CALC BMI ABV UP PARAM F/U: HCPCS | Performed by: SURGERY

## 2022-07-08 NOTE — PROGRESS NOTES
Outpatient Consultation / H&P    Date of Consultation:  7/8/2022    PCP:  No primary care provider on file. Referring Provider:  Dr. Jacob Giraldo ref. provider found     Chief Complaint:   Chief Complaint   Patient presents with    Other     patient has ulcers both legs. no test done. pamlr        History of Present Illness:   Neo Girard is a 75 y.o. male who is a resident of [facility]. He has a long standing history of bilateral leg swelling and venous ulcerations. He reports having been treated for ~15 years with [employer]. He currently is receiving no treatment. He does not wear compression stockings. He reports a leg wound on the left lower extremity below the knee. He currently does not have any weeping fluid from the legs. He does not ambulate much. He is on continuous oxygen. He spends much of day sitting upright with legs in a dependent position. He does have known rather severe Pulmonary hypertension as documented by prior Echo's.         Past Medical History:  Past Medical History:   Diagnosis Date    Anemia     Angina     Arthritis     CAD (coronary artery disease)     [facility]     CKD (chronic kidney disease) stage 3, GFR 30-59 ml/min ([facility])     Clostridium difficile diarrhea 3/16/12; 2/29/12    positive stool toxin    Diabetes mellitus ([address]     Diabetic neuropathy ([facility])     feet and legs    Disease of blood and blood forming organ     Dizziness     When he moves his head/ loses his balance    GERD (gastroesophageal reflux disease)     gastric ulcer    Headache     Hearing loss     Hyperlipidemia     Hypertension     Kidney stone 2002    Refusal of blood transfusions as patient is Congregation     Sleep apnea     Tinnitus     Venous ulcer ([address]     LLE    Wound, open 1/13/2012       Past Surgical History:  Past Surgical History:   Procedure Laterality Date    CARDIAC SURGERY      Dec 27 2010, triple bypass    CHOLECYSTECTOMY 9/2011    HERNIA REPAIR  age2    OTHER SURGICAL HISTORY  11-16-11 REPAIR LOWER STERNAL INCISION, REMOVAL OF ONE STERNAL WIRE,    OTHER SURGICAL HISTORY  10/10/2014    phacoemulsification of cataract with intraocular lens implant left eye    PAIN MANAGEMENT PROCEDURE N/A 2/10/2022    MIDLINE CERVICAL SIX SEVEN EPIDURAL STEROID INJECTION SITE CONFIRMED BY FLUOROSCOPY performed by Clayton Valenzuela MD at [address] Medications:   Prior to Admission medications    Medication Sig Start Date End Date Taking? Authorizing Provider   doxycycline hyclate (VIBRA-TABS) 100 MG tablet  5/27/22  Yes Historical Provider, MD   Sodium Phosphates (FLEET) 7-19 GM/118ML Place 1 enema rectally daily as needed   Yes Historical Provider, MD   glucagon 1 MG injection Inject 1 kit into the muscle once Emergencies only   Yes Historical Provider, MD   Glycerin, Laxative, (GLYCERIN, ADULT,) 2 g SUPP suppository Place 1 suppository rectally as needed   Yes Historical Provider, MD   insulin glargine (LANTUS) 100 UNIT/ML injection vial Inject 10 Units into the skin nightly 10 units SQ at bedtime   Yes Historical Provider, MD   loperamide (IMODIUM) 2 MG capsule Take 2 mg by mouth as needed for Diarrhea   Yes Historical Provider, MD   magnesium hydroxide (MILK OF MAGNESIA) 400 MG/5ML suspension Take by mouth as needed for Constipation   Yes Historical Provider, MD   oxyCODONE-acetaminophen (PERCOCET) 5-325 MG per tablet Take 1 tablet by mouth every 6 hours as needed for Pain.    Yes Historical Provider, MD   ondansetron (ZOFRAN-ODT) 4 MG disintegrating tablet  5/16/22  Yes Historical Provider, MD   RHOPRESSA 0.02 % SOLN  5/19/22  Yes Historical Provider, MD   dorzolamide (TRUSOPT) 2 % ophthalmic solution  5/21/22  Yes Historical Provider, MD   brimonidine-timolol (COMBIGAN) 0.2-0.5 % ophthalmic solution  5/20/22  Yes Historical Provider, MD   metOLazone (ZAROXOLYN) 5 MG tablet Take 1 tablet by mouth once a week On Fridays 5/26/22  Yes PATRICK Guevara CNP   isosorbide mononitrate (IMDUR) 30 MG extended release tablet Take 1 tablet by mouth daily 5/26/22  Yes PATRICK Guevara CNP   insulin glargine (LANTUS SOLOSTAR) 100 UNIT/ML injection pen Inject 35 Units into the skin daily 4/9/22  Yes Chet Archer MD   carvedilol (COREG) 3.125 MG tablet Take 1 tablet by mouth 2 times daily (with meals) 4/9/22  Yes Chet Archer MD   insulin lispro (HUMALOG) 100 UNIT/ML injection vial Inject 10 Units into the skin 3 times daily (with meals) 4/9/22  Yes Chet Archer MD   torsemide (DEMADEX) 100 MG tablet Take 1 tablet by mouth daily 4/10/22  Yes Chet Archer MD   linaclotide Rosita Dopp) 290 MCG CAPS capsule Take 1 capsule by mouth every morning (before breakfast) 4/10/22  Yes Chet Archer MD   tamsulosin (FLOMAX) 0.4 MG capsule Take 0.4 mg by mouth Daily 12/30/21  Yes Hermelinda Alva MD   atorvastatin (LIPITOR) 80 MG tablet Take 1 tablet by mouth nightly 3/14/22  Yes PATRICK Guevara CNP   allopurinol (ZYLOPRIM) 300 MG tablet Take 1 tablet by mouth daily 3/15/22  Yes Dionna Howell MD   naloxone 4 MG/0.1ML LIQD nasal spray 1 spray by Nasal route as needed for Opioid Reversal 11/23/21  Yes Luiza Ocampo MD   acetaminophen (TYLENOL) 500 MG tablet Take 2 tablets by mouth 4 times daily as needed for Pain 9/17/20  Yes PATRICK Lorenzo CNP   senna (SENOKOT) 8.6 MG TABS tablet Take 1 tablet by mouth 2 times daily 7/8/19  Yes PATRICK Medeiros CNP   docusate sodium (COLACE, DULCOLAX) 100 MG CAPS Take 100 mg by mouth 2 times daily 5/1/18  Yes PATRICK Quintanilla CNP   Compression Stockings MISC by Does not apply route 2 pair, knee high compression stockings, fitted/ zippered 6/15/16  Yes PATRICK Quach CNP   silver sulfADIAZINE (SILVADENE) 1 % cream Apply to BL lower leg ulcers daily 2/9/16  Yes Dhaval Rae MD   nitroGLYCERIN (NITROSTAT) 0.4 MG (Medical): Not on file    Lack of Transportation (Non-Medical): Not on file   Physical Activity:     Days of Exercise per Week: Not on file    Minutes of Exercise per Session: Not on file   Stress:     Feeling of Stress : Not on file   Social Connections:     Frequency of Communication with Friends and Family: Not on file    Frequency of Social Gatherings with Friends and Family: Not on file    Attends Adventism Services: Not on file    Active Member of [address] [place] [address] [employer] or Organizations: Not on file    Attends Club or Organization Meetings: Not on file    Marital Status: Not on file   Intimate Partner Violence:     Fear of Current or Ex-Partner: Not on file    Emotionally Abused: Not on file    Physically Abused: Not on file    Sexually Abused: Not on file   Housing Stability:     Unable to Pay for Housing in the Last Year: Not on file    Number of [place] in the Last Year: Not on file    Unstable Housing in the Last Year: Not on file       Family History:        Problem Relation Age of Onset    Heart Disease Mother     Heart Disease Father     Cancer Father     Diabetes Brother     Diabetes Brother        Review of Systems:  A 14 point review of systems was completed. Pertinent positives identified in the HPI, all other review of systems negative. Physical Examination:    BP (!) 142/70 (Site: Right Upper Arm)   Ht 5' 9\" (1.753 m)   Wt 296 lb (134.3 kg)   BMI 43.71 kg/m²     Weight: 296 lb (134.3 kg)       General appearance: NAD, Oxygen by NC. Eyes: PERRLA  Neck: no JVD, no lymphadenopathy. Respiratory: effort is somewhat labored, no crackles, wheezes or rubs. Cardiovascular: regular,   Pulses:    DP PT   RIGHT doppler doppler   LEFT doppler doppler   GI: abdomen soft, nondistended, no organomegaly. Musculoskeletal: strength and tone normal.  Extremities: Severe bilateral lower extremity edema and extensive skin changes of venous stasis dermatitis.   No active venous ulceration or transudate. Small skin abrasion below knee on left. Neuro/psychiatric: grossly intact. Assessment:      Diagnosis Orders   1. Chronic venous stasis dermatitis of both lower extremities     2. Morbid obesity ([address]       Venous hypertension long standing. No active venous ulcerations. Recommendations/Plan:    Leg elevation above heart level as much as possible. Compression- to start either weekly Unna Boots or daily Ace wrap. Once edema under control he should be fitted for 30-40mmHg knee high compression stockings. I would recommend custom made zippered stocking to increase compliance with staff having to put them on daily. Obviously significant weight loss would be helpful but may be unrealistic.        Gio Yee MD, FACS

## 2022-07-11 ENCOUNTER — TELEPHONE (OUTPATIENT)
Dept: CARDIOLOGY CLINIC | Age: 67
End: 2022-07-11

## 2022-07-14 ENCOUNTER — OFFICE VISIT (OUTPATIENT)
Dept: CARDIOLOGY CLINIC | Age: 67
End: 2022-07-14
Payer: MEDICARE

## 2022-07-14 VITALS — HEART RATE: 81 BPM | DIASTOLIC BLOOD PRESSURE: 62 MMHG | OXYGEN SATURATION: 98 % | SYSTOLIC BLOOD PRESSURE: 110 MMHG

## 2022-07-14 DIAGNOSIS — I27.81 COR PULMONALE, CHRONIC (HCC): ICD-10-CM

## 2022-07-14 DIAGNOSIS — I50.32 CHRONIC DIASTOLIC HEART FAILURE (HCC): Primary | ICD-10-CM

## 2022-07-14 DIAGNOSIS — I10 ESSENTIAL HYPERTENSION: ICD-10-CM

## 2022-07-14 DIAGNOSIS — I27.22 PULMONARY HYPERTENSION DUE TO LEFT VENTRICULAR DIASTOLIC DYSFUNCTION (HCC): ICD-10-CM

## 2022-07-14 DIAGNOSIS — I48.0 PAF (PAROXYSMAL ATRIAL FIBRILLATION) (HCC): ICD-10-CM

## 2022-07-14 DIAGNOSIS — I25.10 ASHD (ARTERIOSCLEROTIC HEART DISEASE): ICD-10-CM

## 2022-07-14 PROCEDURE — 99214 OFFICE O/P EST MOD 30 MIN: CPT | Performed by: NURSE PRACTITIONER

## 2022-07-14 PROCEDURE — 3017F COLORECTAL CA SCREEN DOC REV: CPT | Performed by: NURSE PRACTITIONER

## 2022-07-14 PROCEDURE — G8427 DOCREV CUR MEDS BY ELIG CLIN: HCPCS | Performed by: NURSE PRACTITIONER

## 2022-07-14 PROCEDURE — G8417 CALC BMI ABV UP PARAM F/U: HCPCS | Performed by: NURSE PRACTITIONER

## 2022-07-14 PROCEDURE — 1036F TOBACCO NON-USER: CPT | Performed by: NURSE PRACTITIONER

## 2022-07-14 PROCEDURE — 1123F ACP DISCUSS/DSCN MKR DOCD: CPT | Performed by: NURSE PRACTITIONER

## 2022-07-14 RX ORDER — METOLAZONE 5 MG/1
TABLET ORAL
Qty: 24 TABLET | Refills: 0 | Status: ON HOLD | OUTPATIENT
Start: 2022-07-14 | End: 2022-09-01 | Stop reason: SDUPTHER

## 2022-07-14 NOTE — PATIENT INSTRUCTIONS
Plan:   1. Continue daily weights  2. Fax results of BMP to [phone]  3. Continue fluid restriction 1500ml a day  4. Continue metolazone 5mg - give dose tomorrow (Friday) and then adjust to 2 times a week (mondays and fridays)  5. Continue torsemide 100mg daily   6. Repeat BMP and BNP in 2 weeks   7. Low sodium diet 2500mg a day   8. Restart baby aspirin once shoulder procedure completed  9.  Follow up in 10 weeks

## 2022-07-14 NOTE — PROGRESS NOTES
[address]   Cardiac Follow-up    Primary Care Doctor:  No primary care provider on file. Chief Complaint   Patient presents with    Follow-up    Congestive Heart Failure        History of Present Illness:   I had the pleasure of seeing Verna June in follow up for diastolic heart failure, HTN, CAD s/p CABG, pHTN. Has been admitted 5 times so far this year in 2022. His recent admission from 4/24/2022 through 5/3/2022, discharge summary reviewed  discharged to [address]. Since last visit, remains at nursing home. He is frustrated with care. He remains on torsemide 100mg daily and metolazone 5mg on fridays   He took an additional dose of the metolazone today. He is having worse edema. abd is bloated. Complains of dry mouth- tries to sip the water. Using ice chips to help curb the dry mouth. Remains on 4L NC; continue with shortness of breath. Has been off of the aspirin since the the fall. Damian Townsend describes symptoms including dyspnea, fatigue, edema but denies syncope. Home weights: 306 lbs; was down to 292lbs   Appetite: good   Fluid intake:1500ml fluid restriction  Diet: nursing home     NYHA:   II  ACC/ AHA Stage:    C    Past Medical History:   has a past medical history of Anemia, Angina, Arthritis, CAD (coronary artery disease), CHF (congestive heart failure) ([address] CKD (chronic kidney disease) stage 3, GFR 30-59 ml/min ([facility]), Clostridium difficile diarrhea, Diabetes mellitus ([address] Diabetic neuropathy ([address] Disease of blood and blood forming organ, Dizziness, GERD (gastroesophageal reflux disease), Headache, Hearing loss, Hyperlipidemia, Hypertension, Kidney stone, Refusal of blood transfusions as patient is Gnosticist, Sleep apnea, Tinnitus, Venous ulcer ([address] and Wound, open. Surgical History:   has a past surgical history that includes hernia repair (age1);  Cholecystectomy (9/2011); other surgical history (11-16-11 REPAIR LOWER STERNAL INCISION, REMOVAL OF ONE STERNAL WIRE,); Upper gastrointestinal endoscopy; Cardiac surgery; other surgical history (10/10/2014); and Pain management procedure (N/A, 2/10/2022). Social History:   reports that he quit smoking about 34 years ago. His smoking use included cigarettes. He has a 16.00 pack-year smoking history. He has never used smokeless tobacco. He reports that he does not drink alcohol and does not use drugs. Family History:   Family History   Problem Relation Age of Onset    Heart Disease Mother     Heart Disease Father     Cancer Father     Diabetes Brother     Diabetes Brother        Home Medications:  Prior to Admission medications    Medication Sig Start Date End Date Taking? Authorizing Provider   Sodium Phosphates (FLEET) 7-19 GM/118ML Place 1 enema rectally daily as needed   Yes Historical Provider, MD   glucagon 1 MG injection Inject 1 kit into the muscle once Emergencies only   Yes Historical Provider, MD   Glycerin, Laxative, (GLYCERIN, ADULT,) 2 g SUPP suppository Place 1 suppository rectally as needed   Yes Historical Provider, MD   loperamide (IMODIUM) 2 MG capsule Take 2 mg by mouth as needed for Diarrhea   Yes Historical Provider, MD   magnesium hydroxide (MILK OF MAGNESIA) 400 MG/5ML suspension Take by mouth as needed for Constipation   Yes Historical Provider, MD   oxyCODONE-acetaminophen (PERCOCET) 5-325 MG per tablet Take 1 tablet by mouth every 6 hours as needed for Pain. Yes Historical Provider, MD   pregabalin (LYRICA) 25 MG capsule Take 1 capsule by mouth 2 times daily for 3 days. Patient taking differently: Take 25 mg by mouth 2 times daily.  Patient is still taking this medication through nursing home pharmacy as of 6/6/22 5/29/22 7/14/22 Yes Marianna Vazquez MD   ondansetron (ZOFRAN-ODT) 4 MG disintegrating tablet  5/16/22  Yes Historical Provider, MD   RHOPRESSA 0.02 % SOLN  5/19/22  Yes Historical Provider, MD   dorzolamide (TRUSOPT) 2 % ophthalmic solution  5/21/22 Yes Historical Provider, MD   brimonidine-timolol (COMBIGAN) 0.2-0.5 % ophthalmic solution  5/20/22  Yes Historical Provider, MD   metOLazone (ZAROXOLYN) 5 MG tablet Take 1 tablet by mouth once a week On Fridays 5/26/22  Yes PATRICK Castaneda CNP   isosorbide mononitrate (IMDUR) 30 MG extended release tablet Take 1 tablet by mouth daily 5/26/22  Yes PATRICK Castaneda CNP   insulin glargine (LANTUS SOLOSTAR) 100 UNIT/ML injection pen Inject 35 Units into the skin daily 4/9/22  Yes Kala Mary MD   carvedilol (COREG) 3.125 MG tablet Take 1 tablet by mouth 2 times daily (with meals) 4/9/22  Yes Kala Mary MD   insulin lispro (HUMALOG) 100 UNIT/ML injection vial Inject 10 Units into the skin 3 times daily (with meals) 4/9/22  Yes Kala Mary MD   torsemide (DEMADEX) 100 MG tablet Take 1 tablet by mouth daily 4/10/22  Yes Kala Mary MD   linaclotide [facility]) 290 MCG CAPS capsule Take 1 capsule by mouth every morning (before breakfast) 4/10/22  Yes Kala Mary MD   tamsulosin (FLOMAX) 0.4 MG capsule Take 0.4 mg by mouth Daily 12/30/21  Yes Historical Provider, MD   atorvastatin (LIPITOR) 80 MG tablet Take 1 tablet by mouth nightly 3/14/22  Yes PATRICK Castaneda CNP   allopurinol (ZYLOPRIM) 300 MG tablet Take 1 tablet by mouth daily 3/15/22  Yes Krupa Pierre MD   naloxone 4 MG/0.1ML LIQD nasal spray 1 spray by Nasal route as needed for Opioid Reversal 11/23/21  Yes Giovanna Vallejo MD   acetaminophen (TYLENOL) 500 MG tablet Take 2 tablets by mouth 4 times daily as needed for Pain 9/17/20  Yes PATRICK Gonsalez CNP   senna (SENOKOT) 8.6 MG TABS tablet Take 1 tablet by mouth 2 times daily 7/8/19  Yes PATRICK Medeiros CNP   docusate sodium (COLACE, DULCOLAX) 100 MG CAPS Take 100 mg by mouth 2 times daily 5/1/18  Yes Susanne Stein, APRN - CNP   nitroGLYCERIN (NITROSTAT) 0.4 MG SL tablet Place 1 tablet under the tongue every 5 minutes as needed 8/17/15  Yes PATRICK Christensen CNP   fluticasone (FLONASE) 50 MCG/ACT nasal spray 1 spray by Nasal route nightly as needed. Yes Historical Provider, MD   ferrous sulfate 325 (65 FE) MG tablet Take 325 mg by mouth 2 times daily  12/22/10  Yes Historical Provider, MD   omeprazole (PRILOSEC) 20 MG capsule Take 20 mg by mouth 2 times daily.  12/22/10  Yes Historical Provider, MD   doxycycline hyclate (VIBRA-TABS) 100 MG tablet  5/27/22   Historical Provider, MD   insulin glargine (LANTUS) 100 UNIT/ML injection vial Inject 10 Units into the skin nightly 10 units SQ at bedtime    Historical Provider, MD   Compression Stockings MISC by Does not apply route 2 pair, knee high compression stockings, fitted/ zippered 6/15/16   PATRICK Christensen CNP   silver sulfADIAZINE (SILVADENE) 1 % cream Apply to BL lower leg ulcers daily 2/9/16   Elina Chakraborty MD        Allergies:  Amitriptyline, Celecoxib, Cymbalta [duloxetine hcl], Elavil [amitriptyline hcl], Gabapentin, and Nsaids     Physical Examination:    Vitals:    07/14/22 1442   BP: 110/62   Pulse: 81   SpO2: 98%        Constitutional and General Appearance: no apparent distress, obese  HEENT: non-icteric sclera, mask in place, disconjugate gaze  Neck: JVP difficult to assess   Respiratory:  · No use of accessory muscles  · Diminished breath sounds throughout, no wheezing, no crackles, no rhonchi  Cardiovascular:  · + 3/6 high pitched systolic murmur loudest at second right sternal border, no rub or gallop  · Regular rate and rhythm, S1,S2 distant  · Radial pulses 2+ and equal bilaterally  · +  2 BLE edema, bilateral lower extremities with tubigrip sleeves on  Abdomen: obese  · No masses or tenderness, + edema of pannus   · Liver: No Abnormalities Noted  Musculoskeletal/Skin:  · Wheelchair   · There is no clubbing, cyanosis of the extremities  · Skin is warm and dry  · Moves all extremities   Neurological/Psychiatric:  · Alert and oriented in all spheres  · No abnormalities of mood, affect, memory, mentation, or behavior are noted    Lab Data:  CBC:   Lab Results   Component Value Date/Time    WBC 6.7 05/30/2022 07:31 AM    WBC 6.9 05/28/2022 10:57 PM    WBC 7.4 05/26/2022 12:00 AM    RBC 3.90 05/30/2022 07:31 AM    RBC 4.06 05/28/2022 10:57 PM    RBC 3.93 05/26/2022 12:00 AM    HGB 10.4 05/30/2022 07:31 AM    HGB 10.8 05/28/2022 10:57 PM    HGB 10.4 05/26/2022 12:00 AM    HCT 32.6 05/30/2022 07:31 AM    HCT 33.8 05/28/2022 10:57 PM    HCT 33.1 05/26/2022 12:00 AM    MCV 83.6 05/30/2022 07:31 AM    MCV 83.1 05/28/2022 10:57 PM    MCV 84.2 05/26/2022 12:00 AM    RDW 18.0 05/30/2022 07:31 AM    RDW 18.0 05/28/2022 10:57 PM    RDW 18.9 04/24/2022 10:01 PM     05/30/2022 07:31 AM     05/28/2022 10:57 PM     05/26/2022 12:00 AM     BMP:   Lab Results   Component Value Date/Time     05/30/2022 07:30 AM     05/28/2022 10:57 PM     05/26/2022 12:00 AM    K 3.8 05/30/2022 07:30 AM    K 3.8 05/28/2022 10:57 PM    K 3.8 05/26/2022 12:00 AM    K 3.5 05/03/2022 08:46 AM    K 4.6 04/25/2022 08:26 AM    K 4.7 04/24/2022 10:01 PM    CL 90 05/30/2022 07:30 AM    CL 94 05/28/2022 10:57 PM    CL 94 05/26/2022 12:00 AM    CO2 30 05/30/2022 07:30 AM    CO2 30 05/28/2022 10:57 PM    CO2 30 05/26/2022 12:00 AM    PHOS 3.4 05/03/2022 08:46 AM    PHOS 3.7 05/02/2022 08:17 AM    PHOS 3.6 05/01/2022 08:07 AM    BUN 84 05/30/2022 07:30 AM    BUN 82 05/28/2022 10:57 PM    BUN 81 05/26/2022 12:00 AM    CREATININE 2.0 05/30/2022 07:30 AM    CREATININE 1.8 05/28/2022 10:57 PM    CREATININE 1.9 05/26/2022 12:00 AM     BNP:   Lab Results   Component Value Date/Time    PROBNP 4,695 05/01/2022 08:07 AM    PROBNP 4,316 04/28/2022 06:10 AM    PROBNP 4,308 04/24/2022 10:01 PM       Recent Testing:  Echo 9/2016  Left ventricular systolic function is normal with ejection fraction   estimated at 55 %.    Diastolic septal flattening consistent with right ventricular overload. Diastolic filling parameters suggests grade II diastolic dysfunction. Mild aortic stenosis. Right ventricle is mildly enlarged. Right ventricular systolic function is normal, TAPSE 19 mm. Inadequate tricuspid regurgitation jet to estimate systolic pulmonary artery   pressure (SPAP).  Echocardiogram 1/13/2022   Summary   Limited only f/u for LVEF and aortic valve stenosis.   Technically difficult examination.   Low normal left ventricular systolic function with ejection fraction of 50%.   Moderate aortic stenosis.   Aortic stenosis values appear approximately same when compared to echo on 8-.   Mild to moderate aortic regurgitation. Assessment:   chronic diastolic heart failure  CAD s/p CABG x3  PAF; patient declines anticoagulation is willing to accept stroke risk per year  Moderate aortic stenosis  CKD - baseline creatinine 1.7   chronic hypoxemic respiratory failure  Severe HENNA  Diabetes  HLD  HTN  Anemia, patient is Buddhist  Fall s/p Subdural hematoma 5/28/22  Chronic venous stasis       1. Chronic diastolic heart failure ([address]    2. PAF (paroxysmal atrial fibrillation) ([address]    3. Cor pulmonale, chronic ([address]    4. Essential hypertension    5. Pulmonary hypertension due to left ventricular diastolic dysfunction ([address]    6. ASHD (arteriosclerotic heart disease)      Plan:   1. Continue daily weights  2. Fax results of BMP to [phone]  3. Continue fluid restriction 1500ml a day  4. Continue metolazone 5mg - give dose tomorrow (Friday) and then adjust to 2 times a week (mondays and fridays)  5. Continue torsemide 100mg daily   6. Repeat BMP and BNP in 2 weeks   7. Low sodium diet 2500mg a day   8. Restart baby aspirin once shoulder procedure completed  9. Follow up in 10 weeks       I appreciate the opportunity for caring for this patient.      Loida Ayala, PATRICK - CNP, CNP, 7/14/2022, 5:19 PM

## 2022-07-19 ENCOUNTER — TELEPHONE (OUTPATIENT)
Dept: PULMONOLOGY | Age: 67
End: 2022-07-19

## 2022-07-19 ENCOUNTER — SCHEDULED TELEPHONE ENCOUNTER (OUTPATIENT)
Dept: SLEEP MEDICINE | Age: 67
End: 2022-07-19
Payer: MEDICARE

## 2022-07-19 DIAGNOSIS — Z72.821 POOR SLEEP HYGIENE: ICD-10-CM

## 2022-07-19 DIAGNOSIS — Z71.89 ENCOUNTER FOR BIPAP USE COUNSELING: ICD-10-CM

## 2022-07-19 DIAGNOSIS — R53.83 OTHER FATIGUE: ICD-10-CM

## 2022-07-19 DIAGNOSIS — E66.01 OBESITY, CLASS III, BMI 40-49.9 (MORBID OBESITY) (HCC): ICD-10-CM

## 2022-07-19 DIAGNOSIS — G47.10 HYPERSOMNIA: ICD-10-CM

## 2022-07-19 DIAGNOSIS — F51.04 PSYCHOPHYSIOLOGICAL INSOMNIA: ICD-10-CM

## 2022-07-19 DIAGNOSIS — G47.33 SEVERE OBSTRUCTIVE SLEEP APNEA: Primary | ICD-10-CM

## 2022-07-19 PROCEDURE — 99443 PR PHYS/QHP TELEPHONE EVALUATION 21-30 MIN: CPT | Performed by: NURSE PRACTITIONER

## 2022-07-19 ASSESSMENT — SLEEP AND FATIGUE QUESTIONNAIRES
ESS TOTAL SCORE: 12
HOW LIKELY ARE YOU TO NOD OFF OR FALL ASLEEP WHILE SITTING QUIETLY AFTER LUNCH WITHOUT ALCOHOL: 2
HOW LIKELY ARE YOU TO NOD OFF OR FALL ASLEEP IN A CAR, WHILE STOPPED FOR A FEW MINUTES IN TRAFFIC: 0
HOW LIKELY ARE YOU TO NOD OFF OR FALL ASLEEP WHILE WATCHING TV: 3
HOW LIKELY ARE YOU TO NOD OFF OR FALL ASLEEP WHILE LYING DOWN TO REST IN THE AFTERNOON WHEN CIRCUMSTANCES PERMIT: 3
HOW LIKELY ARE YOU TO NOD OFF OR FALL ASLEEP WHILE SITTING AND READING: 3
HOW LIKELY ARE YOU TO NOD OFF OR FALL ASLEEP WHILE SITTING AND TALKING TO SOMEONE: 1
HOW LIKELY ARE YOU TO NOD OFF OR FALL ASLEEP WHILE SITTING INACTIVE IN A PUBLIC PLACE: 0
HOW LIKELY ARE YOU TO NOD OFF OR FALL ASLEEP WHEN YOU ARE A PASSENGER IN A CAR FOR AN HOUR WITHOUT A BREAK: 0

## 2022-07-19 NOTE — TELEPHONE ENCOUNTER
Within this Telehealth Consent, the terms you and yours refer to the person using the Telehealth Service (Service), or in the case of a use of the Service by or on behalf of a minor, you and yours refer to and include (i) the parent or legal guardian who provides consent to the use of the Service by such minor or uses the Service on behalf of such minor, and (ii) the minor for whom consent is being provided or on whose behalf the Service is being utilized. When using Service, you will be consulting with your health care providers via the use of Telehealth.   Telehealth involves the delivery of healthcare services using electronic communications, information technology or other means between a healthcare provider and a patient who are not in the same physical location. Telehealth may be used for diagnosis, treatment, follow-up and/or patient education, and may include, but is not limited to, one or more of the following:   Electronic transmission of medical records, photo images, personal health information or other data between a patient and a healthcare provider   Interactions between a patient and healthcare provider via audio, video and/or data communications   Use of output data from medical devices, sound and video files    Anticipated Benefits   The use of Telehealth by your Provider(s) through the Service may have the following possible benefits:   Making it easier and more efficient for you to access medical care and treatment for the conditions treated by such Provider(s) utilizing the Service   Allowing you to obtain medical care and treatment by Provider(s) at times that are convenient for you   Enabling you to interact with Provider(s) without the necessity of an in-office appointment     Possible Risks   While the use of Telehealth can provide potential benefits for you, there are also potential risks associated with the use of Telehealth.  These risks include, but may not be limited to the following: Your Provider(s) may not able to provide medical treatment for your particular condition and you may be required to seek alternative healthcare or emergency care services. The electronic systems or other security protocols or safeguards used in the Service could fail, causing a breach of privacy of your medical or other information. Given regulatory requirements in certain jurisdictions, your Provider(s) diagnosis and/or treatment options, especially pertaining to certain prescriptions, may be limited. Acceptance   You understand that Services will be provided via Telehealth. This process involves the use of HIPAA compliant and secure, real-time audio-visual interfacing with a qualified and appropriately trained provider located at [facility]. You understand that, under no circumstances, will this session be recorded. You understand that the Provider(s) at [facility] and other clinical participants will be party to the information obtained during the Telehealth session in accordance with best medical practices. You understand that the information obtained during the Telehealth session will be used to help determine the most appropriate treatment options. You understand that You have the right to revoke this consent at any point in time. You understand that Telehealth is voluntary, and that continued treatment is not dependent upon consent. You understand that, in the event of non-consent to Telehealth services and/or technical difficulties, you will obtain services as typically provided in the absence of Telehealth technology. You understand that this consent will be kept in Your medical record. No potential benefits from the use of Telehealth or specific results can be guaranteed. Your condition may not be cured or improved and, in some cases, may get worse.    There are limitations in the provision of medical care and treatment via Telehealth and the Service and you may not be able to receive diagnosis and/or treatment through the Service for every condition for which you seek diagnosis and/or treatment. There are potential risks to the use of Telehealth, including but not limited to the risks described in this Telehealth Consent. Your Provider(s) have discussed the use of Telehealth and the Service with you, including the benefits and risks of such and you have provided oral consent to your Provider(s) for the use of Telehealth and the Service. You understand that it is your duty to provide your Provider(s) truthful, accurate and complete information, including all relevant information regarding care that you may have received or may be receiving from other healthcare providers outside of the Service. You understand that each of your Provider(s) may determine in his or sole discretion that your condition is not suitable for diagnosis and/or treatment using the Service, and that you may need to seek medical care and treatment a specialist or other healthcare provider, outside of the Service. You understand that you are fully responsible for payment for all services provided by Provider(s) or through use of the Service and that you may not be able to use third-party insurance. You represent that (a) you have read this Telehealth Consent carefully, (b) you understand the risks and benefits of the Service and the use of Telehealth in the medical care and treatment provided to you by Provider(s) using the Service, and (c) you have the legal capacity and authority to provide this consent for yourself and/or the minor for which you are consenting under applicable federal and state laws, including laws relating to the age of [de-identified] and/or parental/guardian consent. You give your informed consent to the use of Telehealth by Provider(s) using the Service under the terms described in the Terms of Service and this Telehealth Consent. The patient was read the following statement and has consented to the visit as of 7/19/22. The patient has been scheduled for their first telehealth visit on 07/19/22 with Kacie Gloria CNP.

## 2022-07-19 NOTE — PROGRESS NOTES
Patient ID: Odalis Hernandez is a 75 y.o. male who is being seen today for   Chief Complaint   Patient presents with    Follow-up     3 month sleep after Bipap titration CR scanned          HPI:     Odalis Hernandez is a 75 y.o. male for telephone only virtual visit for HENNA follow up. States he has been in nursing home for 3 months. States he has a lot going on. States he has not given up on the BIPAP but is not really using it. States he does have it at nursing home. He has not completed BIPAP titration. States he is using O2 continuous. +daytime sleepiness. No driving. +fatigue. Sleep is very irregular due to pain and cannot lay in bed for long periods of time. States he does have a back procedure coming up soon. Sleep onset is usually few minutes. States up and down through the night. Sometimes headache in am. ESS is 12        Previous HPI 4/20/22  Odalis Hernandez is a 75 y.o. male for telephone only virtual visit for HENNA follow up. Titration was ordered at last visit due to patient not tolerating BiPAP and to evaluate need for O2 with BiPAP at night however patient did not complete. States he has been in and out the hospital.       States he has not been using his BIPAP. States he has been using O2 continuous. States he has difficulty using BIPAP.     +daytime sleepiness. No driving. +fatigue. Bedtime and wake times vary. Sleep onset is few minutes. Sometimes naps during the day. States he has a lot of pain. No headache in am. No weight gain. ESS is 23        Previous HPI 12/10/22  Odalis Hernandez is a 75 y.o. male for televideo appointment via video and audio doxy. me virtual visit for HENNA follow up. States he was in the hospital and he can home with continuous oxygen. States he is not using BIPAP states he is using O2. States he is not tolerating the BIPAP.     +daytime sleepiness. No driving. +fatigue. Bedtime and wake time varies due to pain, states he sleeps in recliner.  Occasional naps during the day. No headache in am. No weight gain. 0-1 caffienated beverages during the day. ESS is 15. Previous 5/27/21  Shay Robbins is a 75 y.o. male for televideo appointment via video and audio doxy. me virtual visit for HENNA follow up. States he is doing okay with BIPAP. States his circumstances are difficult with his medical conditions and he tries to use BIPAP as much as he can. States he goes from bedroom to chair to sleep due to pain. Patient is using BiPAP   2-5 hrs/night. Using humidifier. No snoring on BiPAP. The pressure is well tolerated. The mask is comfortable-nasal pillows. No mask leak. Some daytime sleepiness. No driving.  +fatigue. Bedtime and wakes times vary due to pain, cannot sleep more than a few hours at time due to the pain. STates is going to have MRI in a few weeks. Sleep onset is 5-10 minutes. Wakes up 3-4 times at night total.  STates has to sleep in increments due to pain. 3-4 nocturia. It takes few-unknown minutes to fall back a sleep. Multiple naps during the day. No headache in am. No weight gain. 0-1 caffienated beverages during the day. ESS is 12      Previous HPI 4/13/21  Vargas Good is a 75 y.o. male for telephone only virtual visit for HENNA follow up. He received new BIPAP after last visit. States he got new BIPAP. States he is doing well with it. Patient is using BiPAP   3-6 hrs/night. Using humidifier. No snoring on BiPAP. The pressure is well tolerated. The mask is comfortable- nasal pillows. No mask leak. Some daytime sleepiness. No driving. Some fatigue. Bedtimes and wakes times vary due to pain. Sleep onset is 5-10 minutes. Wakes up 3-4 times at night total.  STates has to sleep in increments due to pain. 3-4 nocturia. It takes few- unknown minutes to fall back a sleep. Multiple naps during the day. No headache specifically in am.  No weight gain. 0-1 caffienated beverages during the day. No alcohol.  ESS is 11      Previous HPI 2/1/21  Vargas Good is a 75 y.o. male for telephone only virtual visit for HENNA follow up. States his BIPAP stopped working about 1 month ago (he does have a loaner currently from [employer]). States on/off button would not work. States he sent it away to be repaied. He states he was told the blower went out and was told it would be about $240 to fix it. States he is unable to pay that much money for the repair. He is requesting new BIPAP. He has had current BIPAP about 3.5 years. States he cannot sleep without BIPAP. He is using and benefiting from BIPAP. Patient is using BiPAP 4-6 hrs/night. Using humidifier. No snoring on BiPAP. The pressure is well tolerated. The mask is comfortable- nasal pillows. No mask leak. Some daytime sleepiness, medications, poor sleep hygiene. No driving. +fatigue. Bedtimes and wakes times vary due to pain. Sleep onset is 5-10 minutes. Wakes up 3-4 times at night total.  STates has to sleep in increments due to pain. 3-4 nocturia. It takes few- unknown minutes to fall back a sleep. Multiple naps during the day. Rare headache in am. No weight gain. 1-2 caffienated beverages during the day. No alcohol. ESS is 10. Previous HPI 4/8/20  John Tomlin is a 75 y.o. male for telephone only virtual visit for HENNA follow up. States he is doing better with BIPAP. Patient is using BiPAP 3-5 hrs/night. Using humidifier. No snoring on BiPAP. The pressure is well tolerated. The mask is comfortable-nasal pillows. No mask leak. States he might want to change DME. Some daytime sleepiness- pain meds, chronic conditions contributing. No nodding off when driving. No dry nose or throat.  +fatigue. Bedtime is variable. States does not keep a set schedule. States pain pain can affect his sleep, can only sleep 3-4 hours at a time due to pain. Sleep onset is few minutes. Wakes up 3 times at night total. 3 nocturia. It takes few- unknown minutes to fall back a sleep. Few naps during the day.  No headache in am. No weight gain. No caffienated beverages during the day. No alcohol. ESS is 9- discussed with patient. Previous HPI 9/3/20  Carina Clements is a 75 y.o. male in office for HENNA follow up. States he is doing okay with BIPAP. States he has to switch BIPAP from bed to living room due to sleep schedule is broken due to pain. States he is going to get an injection this week and hopes that will help the pain. States he cannot stay in bed for more than 3 hours due to pain, then goes to chair in living room (states chair will not fit in bedroom). Patient is using BiPAP 2-5 hrs/night. Using humidifier. No snoring on BiPAP. The pressure is well tolerated. The mask is comfortable-nasal pillows. No mask leak. Some daytime sleepiness. No driving. No dry nose or throat. Some fatigue. States sleep schedule varies, has to get up do to back pain. .  States he did get a new bed.   +naps during the day. No headache in am. No weight gain. No caffienated beverages during the day. No alcohol. ESS is 10.         Sleep Medicine 7/19/2022 4/20/2022 12/10/2021 5/27/2021 4/13/2021 2/1/2021 4/8/2020   Sitting and reading 3 3 3 2 2 2 2   Watching TV 3 3 3 3 2 3 2   Sitting, inactive in a public place (e.g. a theatre or a meeting) 0 3 2 0 0 0 0   As a passenger in a car for an hour without a break 0 3 0 1 0 0 0   Lying down to rest in the afternoon when circumstances permit 3 3 3 3 2 3 3   Sitting and talking to someone 1 2 2 0 2 0 0   Sitting quietly after a lunch without alcohol 2 3 2 3 3 2 2   In a car, while stopped for a few minutes in traffic 0 3 0 0 0 0 0   Total score 12 23 15 12 11 10 9   Neck circumference (Inches) - - - - - - -       Past Medical History:  Past Medical History:   Diagnosis Date    Anemia     Angina     Arthritis     CAD (coronary artery disease)     CHF (congestive heart failure) ([facility])     CKD (chronic kidney disease) stage 3, GFR 30-59 ml/min ([facility])     Clostridium difficile diarrhea 3/16/12; 2/29/12    positive Emergencies only, Disp: , Rfl:     Glycerin, Laxative, (GLYCERIN, ADULT,) 2 g SUPP suppository, Place 1 suppository rectally as needed, Disp: , Rfl:     loperamide (IMODIUM) 2 MG capsule, Take 2 mg by mouth as needed for Diarrhea, Disp: , Rfl:     magnesium hydroxide (MILK OF MAGNESIA) 400 MG/5ML suspension, Take by mouth as needed for Constipation, Disp: , Rfl:     oxyCODONE-acetaminophen (PERCOCET) 5-325 MG per tablet, Take 1 tablet by mouth every 6 hours as needed for Pain., Disp: , Rfl:     ondansetron (ZOFRAN-ODT) 4 MG disintegrating tablet, , Disp: , Rfl:     RHOPRESSA 0.02 % SOLN, , Disp: , Rfl:     dorzolamide (TRUSOPT) 2 % ophthalmic solution, , Disp: , Rfl:     brimonidine-timolol (COMBIGAN) 0.2-0.5 % ophthalmic solution, , Disp: , Rfl:     isosorbide mononitrate (IMDUR) 30 MG extended release tablet, Take 1 tablet by mouth daily, Disp: 30 tablet, Rfl: 1    insulin glargine (LANTUS SOLOSTAR) 100 UNIT/ML injection pen, Inject 35 Units into the skin daily, Disp: 5 pen, Rfl: 3    carvedilol (COREG) 3.125 MG tablet, Take 1 tablet by mouth 2 times daily (with meals), Disp: 60 tablet, Rfl: 3    insulin lispro (HUMALOG) 100 UNIT/ML injection vial, Inject 10 Units into the skin 3 times daily (with meals), Disp: 10 mL, Rfl: 0    torsemide (DEMADEX) 100 MG tablet, Take 1 tablet by mouth daily, Disp: 30 tablet, Rfl: 3    linaclotide (LINZESS) 290 MCG CAPS capsule, Take 1 capsule by mouth every morning (before breakfast), Disp: 30 capsule, Rfl: 1    tamsulosin (FLOMAX) 0.4 MG capsule, Take 0.4 mg by mouth Daily, Disp: , Rfl:     atorvastatin (LIPITOR) 80 MG tablet, Take 1 tablet by mouth nightly, Disp: 30 tablet, Rfl: 3    allopurinol (ZYLOPRIM) 300 MG tablet, Take 1 tablet by mouth daily, Disp: 30 tablet, Rfl: 0    naloxone 4 MG/0.1ML LIQD nasal spray, 1 spray by Nasal route as needed for Opioid Reversal, Disp: 1 each, Rfl: 0    acetaminophen (TYLENOL) 500 MG tablet, Take 2 tablets by mouth 4 times daily as needed for Pain, Disp: 60 tablet, Rfl: 0    senna (SENOKOT) 8.6 MG TABS tablet, Take 1 tablet by mouth 2 times daily, Disp: 120 tablet, Rfl: 0    docusate sodium (COLACE, DULCOLAX) 100 MG CAPS, Take 100 mg by mouth 2 times daily, Disp: , Rfl:     Compression Stockings MISC, by Does not apply route 2 pair, knee high compression stockings, fitted/ zippered, Disp: 2 each, Rfl: 1    silver sulfADIAZINE (SILVADENE) 1 % cream, Apply to BL lower leg ulcers daily, Disp: 20 g, Rfl: 0    nitroGLYCERIN (NITROSTAT) 0.4 MG SL tablet, Place 1 tablet under the tongue every 5 minutes as needed, Disp: 25 tablet, Rfl: 1    fluticasone (FLONASE) 50 MCG/ACT nasal spray, 1 spray by Nasal route nightly as needed. , Disp: , Rfl:     ferrous sulfate 325 (65 FE) MG tablet, Take 325 mg by mouth 2 times daily , Disp: , Rfl:     omeprazole (PRILOSEC) 20 MG delayed release capsule, Take 20 mg by mouth 2 times daily. , Disp: , Rfl:     pregabalin (LYRICA) 25 MG capsule, Take 1 capsule by mouth 2 times daily for 3 days. (Patient taking differently: Take 25 mg by mouth 2 times daily. Patient is still taking this medication through nursing home pharmacy as of 6/6/22), Disp: 6 capsule, Rfl: 0      Objective:   PHYSICAL EXAM:    There were no vitals taken for this visit. DATA:   9/28 17 PSG AHI 35.4/REM AHI 45.7, PLMS 38, low SPO2 73%  11/16/17 titration-controlled sleep-related breathing with BiPAP, PLMS 86.8 recommendations BiPAP 14/8 cm H2O  8/25/21 echo EF 55-60%    BIPAP compliance data:  Compliance download report from 3/4/18 to 4/2/18 reviewed today by me and showed patient is using machine 3:23 hrs/night with 30% compliance and AHI 0.8 within this time frame. 9/30days with greater than 4 hours of machine use. 30/30days with any BIPAP use  BIPAP 14/8 cm H20    Compliance download report from 4/17/18 to 5/16/18 showed patient is using machine 5:27 hrs/night with 63.3% compliance and AHI 5.1 within this time frame.   19/30days with greater than 4 hours of machine use. BIPAP 14/8 cm H20    Compliance download report from 6/11/18 to 7/10/18 showed patient is using machine 6:42 hrs/night with 100% compliance and AHI 1.4 within this time frame. 30/30days with greater than 4 hours of machine use. BIPAP 14/8 cm H20    Compliance download report from 3/2/19 to 3/31/19 showed patient is using machine 14 min/night with 0% compliance and AHI 5.5 within this time frame. 0/30days with greater than 4 hours of machine use. BIPAP 14/8 cm H20    Compliance download report from 8/4/19 to 9/2/19 showed patient is using machine 2:47 hrs/night with 10% compliance and AHI 1.1 within this time frame. 3/30days with greater than 4 hours of machine use. 29/30 days with any BIPAP use. BIPAP 14/8 cm H20    Compliance download report from 3/4/20 to 4/3/20 reviewed today by me and showed patient is using machine 4:58 hrs/night with 74.2% compliance and AHI 1.2 within this time frame. 21/31 days with greater than 4 hours of machine use. BIPAP 14/8 cm H20    2/1/2021-unable to obtain BiPAP download report. Patient's current BiPAP is broken, he does have loaner from DME. New BIPAP:  Compliance download report from 3/6/21 to 4/4/21 reviewed today by me and showed patient is using machine 3:50 hrs/night with 46% compliance and AHI 1.2 within this time frame. 14/30days with greater than 4 hours of machine use. BIPAP 14/6 cm H20     Compliance download report from 4/25/21 to 5/24/21 reviewed today by me and showed patient is using machine 3:01 hrs/night with 27% compliance and AHI 1.1 within this time frame. 8/30days with greater than 4 hours of machine use. 30/30 days with any BiPAP use  BIPAP 14/6 cm H20     12/10/2021-no BiPAP use in past 30+ days    4/20/22-no BiPAP use in past 30+ days    7/19/2022-no significant BiPAP use in past 30+ days    Assessment:      Severe HENNA.   BIPAP 14/8 cm H2O. patient not tolerating BiPAP, states he is using O2 at night currently  Hypersomnia- identification was verified at the start of the visit. He (or guardian if applicable) is aware that this is a billable service, which includes applicable co-pays. This visit was conducted with the patient's (and/or legal guardian's) verbal consent. He has not had a related appointment within my department in the past 7 days or scheduled within the next 24 hours. The patient was located at Home: [address] [facility] [address] [facility] The provider was located at Home ([address]: [place].     Note: not billable if this call serves to triage the patient into an appointment for the relevant concern    Jessica Naqvi, APRN - CNP

## 2022-07-20 LAB — B-TYPE NATRIURETIC PEPTIDE: 347 PG/ML

## 2022-07-21 ENCOUNTER — HOSPITAL ENCOUNTER (OUTPATIENT)
Age: 67
Setting detail: OUTPATIENT SURGERY
Discharge: HOME | End: 2022-07-21
Attending: PAIN MEDICINE | Admitting: PAIN MEDICINE
Payer: MEDICARE

## 2022-07-21 VITALS
RESPIRATION RATE: 12 BRPM | OXYGEN SATURATION: 97 % | DIASTOLIC BLOOD PRESSURE: 67 MMHG | SYSTOLIC BLOOD PRESSURE: 109 MMHG | TEMPERATURE: 98.7 F | HEART RATE: 95 BPM

## 2022-07-21 LAB
GLUCOSE BLD-MCNC: 237 MG/DL (ref 70–99)
PERFORMED ON: ABNORMAL

## 2022-07-21 PROCEDURE — 2709999900 HC NON-CHARGEABLE SUPPLY: Performed by: PAIN MEDICINE

## 2022-07-21 PROCEDURE — 2500000003 HC RX 250 WO HCPCS: Performed by: PAIN MEDICINE

## 2022-07-21 PROCEDURE — 3600000002 HC SURGERY LEVEL 2 BASE: Performed by: PAIN MEDICINE

## 2022-07-21 PROCEDURE — 7100000011 HC PHASE II RECOVERY - ADDTL 15 MIN: Performed by: PAIN MEDICINE

## 2022-07-21 PROCEDURE — 6360000002 HC RX W HCPCS: Performed by: PAIN MEDICINE

## 2022-07-21 PROCEDURE — 7100000010 HC PHASE II RECOVERY - FIRST 15 MIN: Performed by: PAIN MEDICINE

## 2022-07-21 PROCEDURE — 62321 NJX INTERLAMINAR CRV/THRC: CPT | Performed by: PAIN MEDICINE

## 2022-07-21 RX ORDER — BETAMETHASONE SODIUM PHOSPHATE AND BETAMETHASONE ACETATE 3; 3 MG/ML; MG/ML
INJECTION, SUSPENSION INTRA-ARTICULAR; INTRALESIONAL; INTRAMUSCULAR; SOFT TISSUE PRN
Status: DISCONTINUED | OUTPATIENT
Start: 2022-07-21 | End: 2022-07-21 | Stop reason: ALTCHOICE

## 2022-07-21 RX ORDER — LIDOCAINE HYDROCHLORIDE 10 MG/ML
INJECTION, SOLUTION EPIDURAL; INFILTRATION; INTRACAUDAL; PERINEURAL PRN
Status: DISCONTINUED | OUTPATIENT
Start: 2022-07-21 | End: 2022-07-21 | Stop reason: ALTCHOICE

## 2022-07-21 ASSESSMENT — PAIN DESCRIPTION - DESCRIPTORS: DESCRIPTORS: ACHING

## 2022-07-21 ASSESSMENT — PAIN - FUNCTIONAL ASSESSMENT: PAIN_FUNCTIONAL_ASSESSMENT: 0-10

## 2022-07-21 NOTE — PROCEDURES
River Nowak is a 75 y.o. male patient. No diagnosis found. Past Medical History:   Diagnosis Date    Anemia     Angina     Arthritis     CAD (coronary artery disease)     CHF (congestive heart failure) ([facility])     CKD (chronic kidney disease) stage 3, GFR 30-59 ml/min ([facility])     Clostridium difficile diarrhea 3/16/12; 2/29/12    positive stool toxin    Diabetes mellitus ([address]     Diabetic neuropathy ([facility])     feet and legs    Disease of blood and blood forming organ     Dizziness     When he moves his head/ loses his balance    GERD (gastroesophageal reflux disease)     gastric ulcer    Headache     Hearing loss     Hyperlipidemia     Hypertension     Kidney stone 2002    Refusal of blood transfusions as patient is [employer]ism     Sleep apnea     Tinnitus     Venous ulcer ([facility])     LLE    Wound, open 1/13/2012     Blood pressure 130/71, pulse 95, temperature 98.7 °F (37.1 °C), temperature source Temporal, resp. rate 14, SpO2 98 %. Warden Jackelyn MD  7/21/2022      DICTATING PHYSICIAN: Moisés Dunne M.D        PREOPERATIVE DIAGNOSES: Cervical radiculopathy 723.4, M54.12       POSTOPERATIVE DIAGNOSES: Cervical radiculopathy       OPERATIVE PROCEDURES:  Cervical epidural steroid injection under fluoroscopic guidance at _M TO R C67__ level, intra-laminar approach. [id number] with [address]: Moisés Dunne M.D   INDICATIONS:  Cervical radiculopathy  OPERATIVE PROCEDURE:  Consent signed by patient after risks and benefits explained. Patient was in prone position. Neck was prepped with Prevail and draped. Aseptic technique used at every stage of the procedure. Skin wheal with 1% Lidocaine with 30-gauge needle. _18__ -gauge Tuohy needle placed under AP fluoroscopic guidance to contact the superior part of the lamina at _C7__ level on the _R__ side. The spinous process was in midline.  The needle was then advanced anteriorly and superiorly under fluoroscopic guidance into the interlaminar epidural space with the help of loss of resistance technique. Aspiration was negative for CSF and blood. Injection of Omnipaque dye(0.5cc) showed appropriate spread confirming epidural needle placement. Contralateral oblique view was done to confirm epidural placement. _0_ cc of dye with _9 mg of CELESTONE was injected. Needle pulled out intact. Patient tolerated procedure well. Discharge instructions were given. Injection under low pressure. Patient was monitored and stable.      ESTIMATED BLOOD LOSS:  Less than 1 cc    <<Signature User>>   ________________________________  Renea Warner M.D.

## 2022-07-21 NOTE — DISCHARGE INSTRUCTIONS
[address] [facility]    [phone]    Post Pain Management Injection    PATIENT INSTRUCTIONS:     -Resume Normal Diet  -Other    ACTIVITY:    -No driving or operating machinery for 8 hours post procedure without sedation and 24 hours with sedation. If you are seen driving during this time the proper authorities will be notified.  -Do not stay alone for 4-6 hours after the procedure.  -If you have had IV sedation, do not sign legal documents, make any major decisions, or be involved in work decisions for the remainder of the day. -May shower or bathe.  -Resume normal activity when full movement/sensation has returned in extremities. 3)  SITE CARE:    -Observe puncture site for signs of infection (redness, warmth swelling, drainage with a foul odor, fever or increased tenderness). 4)  EXPECTED SIDE EFFECTS:    -Numbness/tingling/weakness in extremities, if this lasts more than 6 hours notify Dr. Zack Govea. -Muscle stiffness, soreness at puncture site (soreness may last 2-4 days). DIABETIC PATIENTS ONLY:    -Increased glucose levels in all diabetic patients who have received a steroid injection. -Monitor blood sugars frequently for the first 5 days following procedure.      -Adjust medication accordingly. 6)  TO REACH DR. Duglas Wilkins: Call [phone]    ADDITIONAL INSTRUCTIONS:    Follow-up as scheduled or call for appointment if not already done. Patients taking Coumadin may resume taking as before the procedure.

## 2022-07-21 NOTE — PROGRESS NOTES
Discharge instructions given to pt and verbalized understanding, states pain is at a tolerable level, no numbness or weakness noted

## 2022-07-25 NOTE — TELEPHONE ENCOUNTER
Spoke to patient and he said that he is not in good shape and does not want to complete the BIPAP titration.

## 2022-07-28 ENCOUNTER — TELEPHONE (OUTPATIENT)
Dept: CARDIOLOGY CLINIC | Age: 67
End: 2022-07-28

## 2022-07-28 LAB — B-TYPE NATRIURETIC PEPTIDE: 237.8 PG/ML

## 2022-07-28 NOTE — TELEPHONE ENCOUNTER
Pt had his lab work drawn at [facility] and they called to report a critical lab of 237. [facility] will be faxing over results.

## 2022-07-29 ENCOUNTER — TELEPHONE (OUTPATIENT)
Dept: CARDIOLOGY CLINIC | Age: 67
End: 2022-07-29

## 2022-07-29 DIAGNOSIS — I50.32 CHRONIC DIASTOLIC HEART FAILURE (HCC): Primary | ICD-10-CM

## 2022-07-29 DIAGNOSIS — I10 ESSENTIAL HYPERTENSION: ICD-10-CM

## 2022-07-29 DIAGNOSIS — D64.9 ANEMIA, UNSPECIFIED TYPE: ICD-10-CM

## 2022-07-29 DIAGNOSIS — I27.81 COR PULMONALE, CHRONIC (HCC): ICD-10-CM

## 2022-07-29 DIAGNOSIS — I48.0 PAF (PAROXYSMAL ATRIAL FIBRILLATION) (HCC): ICD-10-CM

## 2022-07-29 NOTE — TELEPHONE ENCOUNTER
----- Message from PATRICK Hernández CNP sent at 7/29/2022  9:21 AM EDT -----  Reviewed, please have nursing home send renal panel

## 2022-08-01 NOTE — TELEPHONE ENCOUNTER
Tried to call nursing home, they answered and transferred me, then I got disconnected. I tried back and there's no answer multiple times.

## 2022-08-01 NOTE — TELEPHONE ENCOUNTER
Please get an update from the nursing home regarding his weights and swelling. Also have them fax over a current med list for review.    Thanks

## 2022-08-04 NOTE — TELEPHONE ENCOUNTER
Niles Watson from [facility] returned call. Pts current weight is 296.4. Pt does not have any worsening edema. Niles Watson would like to know when they need to do a repeat renal to check potassium. Niles Watson stated that it is easier for them to communicate through fax. Fax # is [phone].

## 2022-08-04 NOTE — TELEPHONE ENCOUNTER
Noted. Patient did not complete recommended testing. We have discussed importance of this at length.

## 2022-08-10 NOTE — TELEPHONE ENCOUNTER
Meds list received today. Reviewed meds and recent BMP. REcommend the following:   Reduce Metolazone to 2.5mg twice a week. Continue torsemide 100mg daily   INcrease potassium chloride to 40meq for 2 doses and then go back to 20meq.      Repeat BMP, BNP and CBC, ferritin, TIBC, iron saturation (lab orders placed)  in 1 week

## 2022-08-18 ENCOUNTER — TELEPHONE (OUTPATIENT)
Dept: CARDIOLOGY CLINIC | Age: 67
End: 2022-08-18

## 2022-08-18 ENCOUNTER — HOSPITAL ENCOUNTER (EMERGENCY)
Age: 67
Discharge: LEFT BEFORE DISCHARGE | End: 2022-08-18
Attending: EMERGENCY MEDICINE
Payer: MEDICARE

## 2022-08-18 ENCOUNTER — APPOINTMENT (OUTPATIENT)
Dept: GENERAL RADIOLOGY | Age: 67
End: 2022-08-18
Payer: MEDICARE

## 2022-08-18 VITALS
DIASTOLIC BLOOD PRESSURE: 53 MMHG | SYSTOLIC BLOOD PRESSURE: 106 MMHG | BODY MASS INDEX: 45.18 KG/M2 | WEIGHT: 305 LBS | RESPIRATION RATE: 22 BRPM | HEART RATE: 63 BPM | HEIGHT: 69 IN | TEMPERATURE: 98.7 F | OXYGEN SATURATION: 93 %

## 2022-08-18 DIAGNOSIS — E87.70 HYPERVOLEMIA, UNSPECIFIED HYPERVOLEMIA TYPE: ICD-10-CM

## 2022-08-18 DIAGNOSIS — I50.9 ACUTE ON CHRONIC CONGESTIVE HEART FAILURE, UNSPECIFIED HEART FAILURE TYPE (HCC): Primary | ICD-10-CM

## 2022-08-18 DIAGNOSIS — R06.09 DYSPNEA ON EXERTION: ICD-10-CM

## 2022-08-18 LAB
A/G RATIO: 1 (ref 1.1–2.2)
ALBUMIN SERPL-MCNC: 3.7 G/DL (ref 3.4–5)
ALP BLD-CCNC: 126 U/L (ref 40–129)
ALT SERPL-CCNC: 8 U/L (ref 10–40)
ANION GAP SERPL CALCULATED.3IONS-SCNC: 13 MMOL/L (ref 3–16)
AST SERPL-CCNC: 12 U/L (ref 15–37)
BILIRUB SERPL-MCNC: 0.6 MG/DL (ref 0–1)
BUN BLDV-MCNC: 111 MG/DL (ref 7–20)
CALCIUM SERPL-MCNC: 8.8 MG/DL (ref 8.3–10.6)
CHLORIDE BLD-SCNC: 91 MMOL/L (ref 99–110)
CO2: 27 MMOL/L (ref 21–32)
CREAT SERPL-MCNC: 1.9 MG/DL (ref 0.8–1.3)
GFR AFRICAN AMERICAN: 43
GFR NON-AFRICAN AMERICAN: 35
GLUCOSE BLD-MCNC: 394 MG/DL (ref 70–99)
HCT VFR BLD CALC: 30.6 % (ref 40.5–52.5)
HEMOGLOBIN: 9.8 G/DL (ref 13.5–17.5)
MCH RBC QN AUTO: 26.5 PG (ref 26–34)
MCHC RBC AUTO-ENTMCNC: 31.9 G/DL (ref 31–36)
MCV RBC AUTO: 83 FL (ref 80–100)
PDW BLD-RTO: 19.4 % (ref 12.4–15.4)
PLATELET # BLD: 164 K/UL (ref 135–450)
PMV BLD AUTO: 8.5 FL (ref 5–10.5)
POTASSIUM SERPL-SCNC: 4.1 MMOL/L (ref 3.5–5.1)
PRO-BNP: 6526 PG/ML (ref 0–124)
RBC # BLD: 3.69 M/UL (ref 4.2–5.9)
SODIUM BLD-SCNC: 131 MMOL/L (ref 136–145)
TOTAL PROTEIN: 7.4 G/DL (ref 6.4–8.2)
TROPONIN: 0.09 NG/ML
WBC # BLD: 5.5 K/UL (ref 4–11)

## 2022-08-18 PROCEDURE — 83880 ASSAY OF NATRIURETIC PEPTIDE: CPT

## 2022-08-18 PROCEDURE — 84484 ASSAY OF TROPONIN QUANT: CPT

## 2022-08-18 PROCEDURE — 93005 ELECTROCARDIOGRAM TRACING: CPT | Performed by: EMERGENCY MEDICINE

## 2022-08-18 PROCEDURE — 85027 COMPLETE CBC AUTOMATED: CPT

## 2022-08-18 PROCEDURE — 99285 EMERGENCY DEPT VISIT HI MDM: CPT

## 2022-08-18 PROCEDURE — 71045 X-RAY EXAM CHEST 1 VIEW: CPT

## 2022-08-18 PROCEDURE — 80053 COMPREHEN METABOLIC PANEL: CPT

## 2022-08-18 RX ORDER — ATORVASTATIN CALCIUM 40 MG/1
80 TABLET, FILM COATED ORAL NIGHTLY
Status: CANCELLED | OUTPATIENT
Start: 2022-08-18

## 2022-08-18 RX ORDER — FUROSEMIDE 10 MG/ML
40 INJECTION INTRAMUSCULAR; INTRAVENOUS ONCE
Status: DISCONTINUED | OUTPATIENT
Start: 2022-08-18 | End: 2022-08-19 | Stop reason: HOSPADM

## 2022-08-18 RX ORDER — ISOSORBIDE MONONITRATE 30 MG/1
30 TABLET, EXTENDED RELEASE ORAL DAILY
Status: CANCELLED | OUTPATIENT
Start: 2022-08-18

## 2022-08-18 RX ORDER — ALLOPURINOL 300 MG/1
300 TABLET ORAL DAILY
Status: CANCELLED | OUTPATIENT
Start: 2022-08-18

## 2022-08-18 RX ORDER — OXYCODONE HYDROCHLORIDE AND ACETAMINOPHEN 5; 325 MG/1; MG/1
1 TABLET ORAL EVERY 6 HOURS PRN
Status: CANCELLED | OUTPATIENT
Start: 2022-08-18

## 2022-08-18 RX ORDER — PANTOPRAZOLE SODIUM 40 MG/1
40 TABLET, DELAYED RELEASE ORAL
Status: CANCELLED | OUTPATIENT
Start: 2022-08-19

## 2022-08-18 RX ORDER — CARVEDILOL 3.12 MG/1
3.12 TABLET ORAL 2 TIMES DAILY WITH MEALS
Status: CANCELLED | OUTPATIENT
Start: 2022-08-18

## 2022-08-18 RX ORDER — BRIMONIDINE TARTRATE, TIMOLOL MALEATE 2; 5 MG/ML; MG/ML
1 SOLUTION/ DROPS OPHTHALMIC EVERY 12 HOURS
Status: CANCELLED | OUTPATIENT
Start: 2022-08-18

## 2022-08-18 RX ORDER — PSEUDOEPHEDRINE HCL 30 MG
100 TABLET ORAL 2 TIMES DAILY
Status: CANCELLED | OUTPATIENT
Start: 2022-08-18

## 2022-08-18 RX ORDER — TAMSULOSIN HYDROCHLORIDE 0.4 MG/1
0.4 CAPSULE ORAL DAILY
Status: CANCELLED | OUTPATIENT
Start: 2022-08-18

## 2022-08-18 RX ORDER — SENNOSIDES 8.6 MG
1 TABLET ORAL 2 TIMES DAILY
Status: CANCELLED | OUTPATIENT
Start: 2022-08-18

## 2022-08-18 RX ORDER — DORZOLAMIDE HCL 20 MG/ML
1 SOLUTION/ DROPS OPHTHALMIC 3 TIMES DAILY
Status: CANCELLED | OUTPATIENT
Start: 2022-08-18

## 2022-08-18 RX ORDER — PREGABALIN 25 MG/1
25 CAPSULE ORAL 2 TIMES DAILY
Status: CANCELLED | OUTPATIENT
Start: 2022-08-18

## 2022-08-18 ASSESSMENT — PAIN - FUNCTIONAL ASSESSMENT
PAIN_FUNCTIONAL_ASSESSMENT: NONE - DENIES PAIN
PAIN_FUNCTIONAL_ASSESSMENT: NONE - DENIES PAIN

## 2022-08-18 NOTE — ED NOTES
Pt asked to be helped back into bed. This RN helped pt back to bed and repositioned. Pt stated \"I just can not stay here, I understand I might die.  I will not blame you, the doctor or the hospital. I am taking that risk\"      Asher Hernandez RN  08/18/22 1440

## 2022-08-18 NOTE — TELEPHONE ENCOUNTER
Nursing home called. Patient gained 17 pounds this week and is swollen and SOB. I advised that he needs to go to ED.  They V/U    [facility] FYI

## 2022-08-18 NOTE — ED NOTES
Pt informed Oneyda Das was updated and stated pt would be leaving AMA. Pt stated \"A,B,C,D,E. I do not care. I am leaving. I will not stay in the emergency room any longer\" pt informed staff will work on his transportation.       Gerardo Rodriguez RN  08/18/22 [id number]

## 2022-08-18 NOTE — ED NOTES
This RN asked pt if staff can put in a garcia cath since he is being started on a lasix drip. Pt stated \"no I refuse, I will never let anyone do that. This place is horrible, I always have a horrible experience here. I have to stand up to pee. That was forced on me last time, dont make me call the police on you\" pt informed the garcia is so we can keep accurate measure of his urine since he will be on the drip but he can refuse. Pt stated \"No I will use the bucket until I get upstairs\" pt informed the hospital is full and he will be held in the ER. Pt stated \"the doctor told me I will go upstairs, let me talk to him since you cant understand anything. You know no one likes the place, everyone says how horrible it is here. People only come here because they are forced\" pt informed RN understands what he is saying but the hospital is full, and he will most likely not go upstairs tonight. Pt stated \"then tell the doctor I want to leave, I am not staying here. If I am not in a room in 1 hour I want to leave. \" pt informed he will not being in a room in the next hour. Pt stated \"ok then I am leaving, tell the doctor now\" Dr.Duncan brown, stated pt can leave AMA.            Verner Pontes, RN  08/18/22 1927

## 2022-08-18 NOTE — ED PROVIDER NOTES
CHIEF COMPLAINT  Abnormal Lab (Pt brought in by EMS from [facility] for creatinine of 1.8 and BUN of 113. Pt has HX of CHF. )      HISTORY OF PRESENT ILLNESS  Shay Velásquez is a 75 y.o. male with a history of CAD, CHF, chronic kidney disease stage III-IV, diabetes, diabetic neuropathy, hypertension, hyperlipidemia presenting for evaluation of abnormal kidney labs, weight gain. Patient has reportedly gained about 15 to 20 pounds of weight recently. He states that he has had increased swelling to his lower legs, abdomen. He has previously required IV diuresis for management of CHF exacerbation. He reports that he has been dyspneic on exertion. He has been taking his medications as prescribed. He is on metolazone, torsemide. He denies any chest pain. He reports that its been frustrating as it seems like he has not been able to be on a consistent regimen of medicines to adequately control his fluid. No other complaints, modifying factors or associated symptoms. I have reviewed the following from the nursing documentation.    Past Medical History:   Diagnosis Date    Anemia     Angina     Arthritis     CAD (coronary artery disease)     CHF (congestive heart failure) (HCC)     CKD (chronic kidney disease) stage 3, GFR 30-59 ml/min ([facility])     Clostridium difficile diarrhea 3/16/12; 2/29/12    positive stool toxin    Diabetes mellitus ([address]     Diabetic neuropathy ([facility])     feet and legs    Disease of blood and blood forming organ     Dizziness     When he moves his head/ loses his balance    GERD (gastroesophageal reflux disease)     gastric ulcer    Headache     Hearing loss     Hyperlipidemia     Hypertension     Kidney stone 2002    Refusal of blood transfusions as patient is Rastafarian     Sleep apnea     Tinnitus     Venous ulcer ([address]     LLE    Wound, open 1/13/2012     Past Surgical History:   Procedure Laterality Date    CARDIAC SURGERY      Dec 27 2010, triple bypass    CHOLECYSTECTOMY 2011    HERNIA REPAIR  age2    OTHER SURGICAL HISTORY  10 REPAIR LOWER STERNAL INCISION, REMOVAL OF ONE STERNAL WIRE,    OTHER SURGICAL HISTORY  10/10/2014    phacoemulsification of cataract with intraocular lens implant left eye    PAIN MANAGEMENT PROCEDURE N/A 2/10/2022    MIDLINE CERVICAL SIX SEVEN EPIDURAL STEROID INJECTION SITE CONFIRMED BY FLUOROSCOPY performed by Azeb Wood MD at [address] [facility] OR    PAIN MANAGEMENT PROCEDURE N/A 2022    MIDLINE TO RIGHT CERVICAL SIX SEVEN EPIDURAL STEROID INJECTION SITE CONFIRMED BY FLUOROSCOPY performed by Azeb Wood MD at [address] [facility] OR    UPPER GASTROINTESTINAL ENDOSCOPY       Family History   Problem Relation Age of Onset    Heart Disease Mother     Heart Disease Father     Cancer Father     Diabetes Brother     Diabetes Brother      Social History     Socioeconomic History    Marital status: Single     Spouse name: Not on file    Number of children: Not on file    Years of education: Not on file    Highest education level: Not on file   Occupational History    Not on file   Tobacco Use    Smoking status: Former     Packs/day: 1.00     Years: 16.00     Pack years: 16.00     Types: Cigarettes     Quit date: 1/10/1988     Years since quittin.6    Smokeless tobacco: Never    Tobacco comments:     22 yrs ago   Vaping Use    Vaping Use: Never used   Substance and Sexual Activity    Alcohol use: No     Alcohol/week: 0.0 standard drinks    Drug use: No    Sexual activity: Not Currently   Other Topics Concern    Not on file   Social History Narrative    Not on file     Social Determinants of Health     Financial Resource Strain: Not on file   Food Insecurity: Not on file   Transportation Needs: Not on file   Physical Activity: Not on file   Stress: Not on file   Social Connections: Not on file   Intimate Partner Violence: Not on file   Housing Stability: Not on file     Current Facility-Administered Medications   Medication Dose Route Frequency Provider Last Rate Last Admin    furosemide (LASIX) injection 40 mg  40 mg IntraVENous Once Yessi Ferraro MD        furosemide (LASIX) 100 mg in dextrose 5 % 100 mL infusion  5 mg/hr IntraVENous Continuous Yessi Ferraro MD         Current Outpatient Medications   Medication Sig Dispense Refill    metOLazone (ZAROXOLYN) 5 MG tablet 1 tab twice a week (Monday and fridays) 24 tablet 0    Sodium Phosphates (FLEET) 7-19 GM/118ML Place 1 enema rectally daily as needed      glucagon 1 MG injection Inject 1 kit into the muscle once Emergencies only      Glycerin, Laxative, (GLYCERIN, ADULT,) 2 g SUPP suppository Place 1 suppository rectally as needed      loperamide (IMODIUM) 2 MG capsule Take 2 mg by mouth as needed for Diarrhea      magnesium hydroxide (MILK OF MAGNESIA) 400 MG/5ML suspension Take by mouth as needed for Constipation      oxyCODONE-acetaminophen (PERCOCET) 5-325 MG per tablet Take 1 tablet by mouth every 6 hours as needed for Pain. pregabalin (LYRICA) 25 MG capsule Take 1 capsule by mouth 2 times daily for 3 days. (Patient taking differently: Take 25 mg by mouth in the morning and 25 mg before bedtime.  Patient is still taking this medication through nursing home pharmacy as of 6/6/22.) 6 capsule 0    ondansetron (ZOFRAN-ODT) 4 MG disintegrating tablet       RHOPRESSA 0.02 % SOLN       dorzolamide (TRUSOPT) 2 % ophthalmic solution       brimonidine-timolol (COMBIGAN) 0.2-0.5 % ophthalmic solution       isosorbide mononitrate (IMDUR) 30 MG extended release tablet Take 1 tablet by mouth daily 30 tablet 1    insulin glargine (LANTUS SOLOSTAR) 100 UNIT/ML injection pen Inject 35 Units into the skin daily 5 pen 3    carvedilol (COREG) 3.125 MG tablet Take 1 tablet by mouth 2 times daily (with meals) 60 tablet 3    insulin lispro (HUMALOG) 100 UNIT/ML injection vial Inject 10 Units into the skin 3 times daily (with meals) 10 mL 0    torsemide (DEMADEX) 100 MG tablet Take 1 tablet by mouth daily 30 tablet 3 linaclotide (LINZESS) 290 MCG CAPS capsule Take 1 capsule by mouth every morning (before breakfast) 30 capsule 1    tamsulosin (FLOMAX) 0.4 MG capsule Take 0.4 mg by mouth Daily      atorvastatin (LIPITOR) 80 MG tablet Take 1 tablet by mouth nightly 30 tablet 3    allopurinol (ZYLOPRIM) 300 MG tablet Take 1 tablet by mouth daily 30 tablet 0    naloxone 4 MG/0.1ML LIQD nasal spray 1 spray by Nasal route as needed for Opioid Reversal 1 each 0    acetaminophen (TYLENOL) 500 MG tablet Take 2 tablets by mouth 4 times daily as needed for Pain 60 tablet 0    senna (SENOKOT) 8.6 MG TABS tablet Take 1 tablet by mouth 2 times daily 120 tablet 0    docusate sodium (COLACE, DULCOLAX) 100 MG CAPS Take 100 mg by mouth 2 times daily      Compression Stockings MISC by Does not apply route 2 pair, knee high compression stockings, fitted/ zippered 2 each 1    silver sulfADIAZINE (SILVADENE) 1 % cream Apply to BL lower leg ulcers daily 20 g 0    nitroGLYCERIN (NITROSTAT) 0.4 MG SL tablet Place 1 tablet under the tongue every 5 minutes as needed 25 tablet 1    fluticasone (FLONASE) 50 MCG/ACT nasal spray 1 spray by Nasal route nightly as needed. ferrous sulfate 325 (65 FE) MG tablet Take 325 mg by mouth 2 times daily       omeprazole (PRILOSEC) 20 MG delayed release capsule Take 20 mg by mouth 2 times daily. Allergies   Allergen Reactions    Amitriptyline Other (See Comments)     tremor    Celecoxib      Hyperkalemia    Cymbalta [Duloxetine Hcl] Other (See Comments)     Tremors and slurred speech    Elavil [Amitriptyline Hcl]      tremor    Gabapentin      Tremor at high dose    Nsaids      Gastric ulcer       REVIEW OF SYSTEMS  Positive and pertinent negatives as per HPI. All other systems were reviewed and are negative.     PHYSICAL EXAM  BP (!) 106/53   Pulse 63   Temp 98.7 °F (37.1 °C) (Oral)   Resp 22   Ht 5' 9\" (1.753 m)   Wt (!) 305 lb (138.3 kg)   SpO2 93%   BMI 45.04 kg/m²   GENERAL APPEARANCE: Awake and alert. Cooperative. HEAD: Normocephalic. Atraumatic. EYES: PERRL. EOM's grossly intact. ENT: Mucous membranes are moist.   HEART: RRR. No harsh murmurs. Intact radial pulses 2+ bilaterally. LUNGS: Respirations unlabored without accessory muscle use. Speaking comfortably in full sentences. ABDOMEN: Soft. Non-distended. Non-tender. No guarding or rebound. Obese abdomen and anasarca  EXTREMITIES: 3+ pitting edema to lower extremities bilaterally, changes of chronic venous insufficiency  SKIN: Warm and dry. No acute rashes. NEUROLOGICAL: Alert and oriented X 3. No focal deficits. LABS  I have reviewed all labs for this visit.    Results for orders placed or performed during the hospital encounter of 08/18/22   CBC   Result Value Ref Range    WBC 5.5 4.0 - 11.0 K/uL    RBC 3.69 (L) 4.20 - 5.90 M/uL    Hemoglobin 9.8 (L) 13.5 - 17.5 g/dL    Hematocrit 30.6 (L) 40.5 - 52.5 %    MCV 83.0 80.0 - 100.0 fL    MCH 26.5 26.0 - 34.0 pg    MCHC 31.9 31.0 - 36.0 g/dL    RDW 19.4 (H) 12.4 - 15.4 %    Platelets 123 204 - 346 K/uL    MPV 8.5 5.0 - 10.5 fL   Comprehensive Metabolic Panel   Result Value Ref Range    Sodium 131 (L) 136 - 145 mmol/L    Potassium 4.1 3.5 - 5.1 mmol/L    Chloride 91 (L) 99 - 110 mmol/L    CO2 27 21 - 32 mmol/L    Anion Gap 13 3 - 16    Glucose 394 (H) 70 - 99 mg/dL     (HH) 7 - 20 mg/dL    Creatinine 1.9 (H) 0.8 - 1.3 mg/dL    GFR Non-African American 35 (A) >60    GFR  43 (A) >60    Calcium 8.8 8.3 - 10.6 mg/dL    Total Protein 7.4 6.4 - 8.2 g/dL    Albumin 3.7 3.4 - 5.0 g/dL    Albumin/Globulin Ratio 1.0 (L) 1.1 - 2.2    Total Bilirubin 0.6 0.0 - 1.0 mg/dL    Alkaline Phosphatase 126 40 - 129 U/L    ALT 8 (L) 10 - 40 U/L    AST 12 (L) 15 - 37 U/L   Troponin   Result Value Ref Range    Troponin 0.09 (H) <0.01 ng/mL   Brain Natriuretic Peptide   Result Value Ref Range    Pro-BNP 6,526 (H) 0 - 124 pg/mL   EKG 12 Lead   Result Value Ref Range    Ventricular Rate 73 BPM Atrial Rate 73 BPM    P-R Interval 148 ms    QRS Duration 84 ms    Q-T Interval 558 ms    QTc Calculation (Bazett) 614 ms    R Axis -35 degrees    T Axis 178 degrees    Diagnosis       Sinus rhythm with Premature atrial complexesLeft axis deviationNonspecific T wave abnormalityProlonged QTAbnormal ECGWhen compared with ECG of 24-APR-2022 20:24,Premature atrial complexes are now PresentNonspecific T wave abnormality, worse in Inferior leadsNonspecific T wave abnormality now evident in Anterior leadsQT has lengthened         EKG  The Ekg interpreted by myself in the emergency department in the absence of a cardiologist.  normal sinus rhythm with a rate of 73  Axis is   Left axis deviation  QTc is   614  Intervals and Durations are unremarkable. No specific ST-T wave changes appreciated. No evidence of acute ischemia. No significant change from prior EKG dated April 24, 2022      RADIOLOGY  X-RAYS:  I have reviewed radiologic plain film image(s). ALL OTHER NON-PLAIN FILM IMAGES SUCH AS CT, ULTRASOUND AND MRI HAVE BEEN READ BY THE RADIOLOGIST. XR CHEST PORTABLE   Final Result   Cardiomegaly with interstitial edema consistent with CHF. Underlying   superimposed pneumonia cannot be fully excluded. No results found. Critical Care: Total critical care time is 30 minutes, which excludes separately billable procedures and updating family. Time spent is specifically for management of the presenting complaint and symptoms initially, direct bedside care, reevaluation, review of records, and consultation. There was a high probability of clinically significant life-threatening deterioration in the patient's condition, which required my urgent intervention.              During the patient's ED course, the patient was given:  Medications   furosemide (LASIX) injection 40 mg (has no administration in time range) furosemide (LASIX) 100 mg in dextrose 5 % 100 mL infusion (has no administration in time range)        ED COURSE/MDM  Patient seen and evaluated. Old records reviewed. Labs and imaging reviewed and results discussed with patient. 68-year-old male presenting with signs and symptoms concerning for CHF exacerbation, volume overload. Blood pressure in the 053 systolics, SPO2 in the low to mid 90s. He has exam that is consistent with volume overload. Chest x-ray with pulmonary edema. Creatinine is stable at 1.9 however he is new uremia to 113. No symptoms of GI bleed. I spoke with on-call nephrology, Dr. Awilda Cuevas, who recommended Lasix infusion as this has previously worked well for the patient. Patient expressed frustration to nursing staff that his hospital bed was not immediately available. He states that he has chronic back pain and if he is not able to get to his hospital room soon, he wants to go back to his nursing facility. Myself and nursing staff explained to the patient that the hospital is full and we are holding patients in the emergency department. He does have elevated troponin, exam consistent with volume overload and his symptoms were only improved with Lasix infusion and he definitely requires inpatient hospitalization. Patient however does not want to listen to this and continues to want to leave [address] I have recommended admission to the hospital, but the patient refuses. The risks (including but not limited to suffering and death) as well as the benefits were explained to the patient. Questions were sought and answered, the patient voiced understanding and accepts these risks. I have encouraged the patient to return to have their evaluation completed as we are glad to do so. I have also instructed the patient on the importance of follow-up and to return for any worsening or worrisome concerns.  The patient appears to have capacity to make medical decisions at this time. AMA form signed and placed on the chart. Is this patient to be included in the SEP-1 Core Measure due to severe sepsis or septic shock? No   Exclusion criteria - the patient is NOT to be included for SEP-1 Core Measure due to: Alternative explanation for abnormal labs/vitals that do not relate to sepsis, see MDM for further explanation          CLINICAL IMPRESSION  1. Acute on chronic congestive heart failure, unspecified heart failure type ([address]    2. Hypervolemia, unspecified hypervolemia type    3. Dyspnea on exertion        Blood pressure (!) 106/53, pulse 63, temperature 98.7 °F (37.1 °C), temperature source Oral, resp. rate 22, height 5' 9\" (1.753 m), weight (!) 305 lb (138.3 kg), SpO2 93 %. DISPOSITION  Elvis Campos was discharged AMA. This chart was generated in part by using Dragon Dictation system and may contain errors related to that system including errors in grammar, punctuation, and spelling, as well as words and phrases that may be inappropriate. If there are any questions or concerns please feel free to contact the dictating provider for clarification.        Kaz Jacobs MD  08/18/22 0563

## 2022-08-18 NOTE — PLAN OF CARE
Patient was seen and examined but he decided to sign out AMA per ED staff before he could be admitted.     Emanuel Lazcano MD  8/18/2022  6:30 PM

## 2022-08-18 NOTE — ED NOTES
Pt has been pressing call light around every 5-10 minutes asking to sit up or lay down in bed. Once in room pt positions himself. Pt stated Oliverio Jc does it take 2 minutes for you to come after you answer the light\" pt informed ER is very busy but we are trying out best to keep him happy and safe.  Pt stated \"I need my 6pm percocet\" pt asked if he is still leaving AMA pt stated \"yes I am\"     Mitzy Zamora RN  08/18/22 1931

## 2022-08-18 NOTE — ED NOTES
Pt stated he wants taken off of monitor while he waits for transport.       Eri Escobar RN  08/18/22 0290

## 2022-08-19 ENCOUNTER — TELEPHONE (OUTPATIENT)
Dept: CARDIOLOGY CLINIC | Age: 67
End: 2022-08-19

## 2022-08-19 LAB
EKG ATRIAL RATE: 73 BPM
EKG DIAGNOSIS: NORMAL
EKG P-R INTERVAL: 148 MS
EKG Q-T INTERVAL: 558 MS
EKG QRS DURATION: 84 MS
EKG QTC CALCULATION (BAZETT): 614 MS
EKG R AXIS: -35 DEGREES
EKG T AXIS: 178 DEGREES
EKG VENTRICULAR RATE: 73 BPM

## 2022-08-19 PROCEDURE — 93010 ELECTROCARDIOGRAM REPORT: CPT | Performed by: INTERNAL MEDICINE

## 2022-08-19 NOTE — TELEPHONE ENCOUNTER
Spoke with nurse. Reviewed ER labs. Admission was recommended and he signed out AMA. He got metolazone this am as he gets mondays and thursdays. Recommend:   Adjust the tosremide to 100mg twice a day. Repeat labs on Monday; CBC, BMP, BNP. Would recommend transfer back to the ER if confused/encephalopathy occurs.

## 2022-08-19 NOTE — TELEPHONE ENCOUNTER
Hakan Frazier, Nurse for pt at [facility] calling in to ask what the next step for the pt is. Pt went to the ED due to advisement from RN SC. Pt was going to be admitted, however hospital did not have a room available for pt. Pt did not want to stay in ED overnight & left AMA. Please call phon number [phone] & ask for Hakan Frazier on [address]. Could we please advise & thank you.

## 2022-08-22 ENCOUNTER — TELEPHONE (OUTPATIENT)
Dept: CARDIOLOGY CLINIC | Age: 67
End: 2022-08-22

## 2022-08-22 NOTE — TELEPHONE ENCOUNTER
Bhanu Miranda from [facility] called with criticle lab results for pt. BNP- 517.3  BMP- 112    Yulissa stated that pt did go to the er but left ama. Bhanu Miranda will fax over the results once they have a paper copy.

## 2022-08-22 NOTE — TELEPHONE ENCOUNTER
TAYA---  Tried to call the nursing home back, no answer and unable to leave a message. We do already have the labs if she is talking about the ones from 08/18/2022. Patient was also in er on 08/18/2022.

## 2022-08-23 RX ORDER — TORSEMIDE 100 MG/1
100 TABLET ORAL 2 TIMES DAILY
Qty: 30 TABLET | Refills: 3 | Status: SHIPPED
Start: 2022-08-23 | End: 2022-10-26

## 2022-08-24 ENCOUNTER — TELEPHONE (OUTPATIENT)
Dept: CARDIOLOGY CLINIC | Age: 67
End: 2022-08-24

## 2022-08-24 ENCOUNTER — OFFICE VISIT (OUTPATIENT)
Dept: CARDIOLOGY CLINIC | Age: 67
End: 2022-08-24
Payer: MEDICARE

## 2022-08-24 ENCOUNTER — HOSPITAL ENCOUNTER (INPATIENT)
Age: 67
LOS: 8 days | Discharge: NURSING, REHAB OR LONG-TERM CARE | DRG: 291 | End: 2022-09-01
Attending: INTERNAL MEDICINE | Admitting: INTERNAL MEDICINE
Payer: MEDICARE

## 2022-08-24 ENCOUNTER — APPOINTMENT (OUTPATIENT)
Dept: CT IMAGING | Age: 67
DRG: 291 | End: 2022-08-24
Attending: INTERNAL MEDICINE
Payer: MEDICARE

## 2022-08-24 ENCOUNTER — APPOINTMENT (OUTPATIENT)
Dept: GENERAL RADIOLOGY | Age: 67
DRG: 291 | End: 2022-08-24
Attending: INTERNAL MEDICINE
Payer: MEDICARE

## 2022-08-24 VITALS
DIASTOLIC BLOOD PRESSURE: 64 MMHG | BODY MASS INDEX: 44.73 KG/M2 | OXYGEN SATURATION: 96 % | HEIGHT: 69 IN | WEIGHT: 302 LBS | SYSTOLIC BLOOD PRESSURE: 120 MMHG | HEART RATE: 73 BPM

## 2022-08-24 DIAGNOSIS — N18.4 STAGE 4 CHRONIC KIDNEY DISEASE (HCC): ICD-10-CM

## 2022-08-24 DIAGNOSIS — E87.70 HYPERVOLEMIA, UNSPECIFIED HYPERVOLEMIA TYPE: Primary | ICD-10-CM

## 2022-08-24 DIAGNOSIS — I50.33 ACUTE ON CHRONIC DIASTOLIC HEART FAILURE (HCC): ICD-10-CM

## 2022-08-24 DIAGNOSIS — I10 ESSENTIAL HYPERTENSION: ICD-10-CM

## 2022-08-24 DIAGNOSIS — I35.0 AORTIC VALVE STENOSIS, ETIOLOGY OF CARDIAC VALVE DISEASE UNSPECIFIED: ICD-10-CM

## 2022-08-24 LAB
ANION GAP SERPL CALCULATED.3IONS-SCNC: 11 MMOL/L (ref 3–16)
BASOPHILS ABSOLUTE: 0 K/UL (ref 0–0.2)
BASOPHILS RELATIVE PERCENT: 0.5 %
BUN BLDV-MCNC: 111 MG/DL (ref 7–20)
CALCIUM SERPL-MCNC: 9.4 MG/DL (ref 8.3–10.6)
CHLORIDE BLD-SCNC: 91 MMOL/L (ref 99–110)
CO2: 32 MMOL/L (ref 21–32)
CREAT SERPL-MCNC: 1.9 MG/DL (ref 0.8–1.3)
EOSINOPHILS ABSOLUTE: 0.1 K/UL (ref 0–0.6)
EOSINOPHILS RELATIVE PERCENT: 1.8 %
GFR AFRICAN AMERICAN: 43
GFR NON-AFRICAN AMERICAN: 35
GLUCOSE BLD-MCNC: 211 MG/DL (ref 70–99)
GLUCOSE BLD-MCNC: 220 MG/DL (ref 70–99)
GLUCOSE BLD-MCNC: 288 MG/DL (ref 70–99)
HCT VFR BLD CALC: 33.5 % (ref 40.5–52.5)
HEMOGLOBIN: 10.7 G/DL (ref 13.5–17.5)
LYMPHOCYTES ABSOLUTE: 0.5 K/UL (ref 1–5.1)
LYMPHOCYTES RELATIVE PERCENT: 7.5 %
MAGNESIUM: 1.8 MG/DL (ref 1.8–2.4)
MCH RBC QN AUTO: 26.4 PG (ref 26–34)
MCHC RBC AUTO-ENTMCNC: 31.9 G/DL (ref 31–36)
MCV RBC AUTO: 82.8 FL (ref 80–100)
MONOCYTES ABSOLUTE: 0.4 K/UL (ref 0–1.3)
MONOCYTES RELATIVE PERCENT: 7.1 %
NEUTROPHILS ABSOLUTE: 5.2 K/UL (ref 1.7–7.7)
NEUTROPHILS RELATIVE PERCENT: 83.1 %
PDW BLD-RTO: 19 % (ref 12.4–15.4)
PERFORMED ON: ABNORMAL
PERFORMED ON: ABNORMAL
PHOSPHORUS: 4.1 MG/DL (ref 2.5–4.9)
PLATELET # BLD: 162 K/UL (ref 135–450)
PMV BLD AUTO: 8.7 FL (ref 5–10.5)
POTASSIUM SERPL-SCNC: 3.4 MMOL/L (ref 3.5–5.1)
PRO-BNP: 6892 PG/ML (ref 0–124)
RBC # BLD: 4.04 M/UL (ref 4.2–5.9)
SODIUM BLD-SCNC: 134 MMOL/L (ref 136–145)
WBC # BLD: 6.2 K/UL (ref 4–11)

## 2022-08-24 PROCEDURE — 6370000000 HC RX 637 (ALT 250 FOR IP): Performed by: INTERNAL MEDICINE

## 2022-08-24 PROCEDURE — 80048 BASIC METABOLIC PNL TOTAL CA: CPT

## 2022-08-24 PROCEDURE — 2060000000 HC ICU INTERMEDIATE R&B

## 2022-08-24 PROCEDURE — 2580000003 HC RX 258: Performed by: INTERNAL MEDICINE

## 2022-08-24 PROCEDURE — 83880 ASSAY OF NATRIURETIC PEPTIDE: CPT

## 2022-08-24 PROCEDURE — 71045 X-RAY EXAM CHEST 1 VIEW: CPT

## 2022-08-24 PROCEDURE — 36415 COLL VENOUS BLD VENIPUNCTURE: CPT

## 2022-08-24 PROCEDURE — 6360000002 HC RX W HCPCS

## 2022-08-24 PROCEDURE — 6360000002 HC RX W HCPCS: Performed by: INTERNAL MEDICINE

## 2022-08-24 PROCEDURE — 6370000000 HC RX 637 (ALT 250 FOR IP)

## 2022-08-24 PROCEDURE — 99215 OFFICE O/P EST HI 40 MIN: CPT | Performed by: NURSE PRACTITIONER

## 2022-08-24 PROCEDURE — 94761 N-INVAS EAR/PLS OXIMETRY MLT: CPT

## 2022-08-24 PROCEDURE — 74176 CT ABD & PELVIS W/O CONTRAST: CPT

## 2022-08-24 PROCEDURE — 1123F ACP DISCUSS/DSCN MKR DOCD: CPT | Performed by: NURSE PRACTITIONER

## 2022-08-24 PROCEDURE — 85025 COMPLETE CBC W/AUTO DIFF WBC: CPT

## 2022-08-24 PROCEDURE — 83735 ASSAY OF MAGNESIUM: CPT

## 2022-08-24 PROCEDURE — 2580000003 HC RX 258

## 2022-08-24 PROCEDURE — 1200000000 HC SEMI PRIVATE

## 2022-08-24 PROCEDURE — 84100 ASSAY OF PHOSPHORUS: CPT

## 2022-08-24 PROCEDURE — 2700000000 HC OXYGEN THERAPY PER DAY

## 2022-08-24 RX ORDER — POTASSIUM CHLORIDE 20 MEQ/1
20 TABLET, EXTENDED RELEASE ORAL ONCE
Status: COMPLETED | OUTPATIENT
Start: 2022-08-24 | End: 2022-08-24

## 2022-08-24 RX ORDER — FERROUS SULFATE 325(65) MG
325 TABLET ORAL 2 TIMES DAILY
Status: DISCONTINUED | OUTPATIENT
Start: 2022-08-24 | End: 2022-09-01 | Stop reason: HOSPADM

## 2022-08-24 RX ORDER — MECLIZINE HYDROCHLORIDE 25 MG/1
25 TABLET ORAL EVERY 8 HOURS
Status: ON HOLD | COMMUNITY
Start: 2022-07-27 | End: 2022-10-23 | Stop reason: CLARIF

## 2022-08-24 RX ORDER — POLYETHYLENE GLYCOL 3350 17 G/17G
17 POWDER, FOR SOLUTION ORAL DAILY PRN
Status: DISCONTINUED | OUTPATIENT
Start: 2022-08-24 | End: 2022-09-01 | Stop reason: HOSPADM

## 2022-08-24 RX ORDER — ACETAMINOPHEN 650 MG/1
650 SUPPOSITORY RECTAL EVERY 6 HOURS PRN
Status: DISCONTINUED | OUTPATIENT
Start: 2022-08-24 | End: 2022-09-01 | Stop reason: HOSPADM

## 2022-08-24 RX ORDER — SODIUM CHLORIDE 0.9 % (FLUSH) 0.9 %
5-40 SYRINGE (ML) INJECTION PRN
Status: DISCONTINUED | OUTPATIENT
Start: 2022-08-24 | End: 2022-09-01 | Stop reason: HOSPADM

## 2022-08-24 RX ORDER — POLYETHYLENE GLYCOL 3350 17 G/17G
17 POWDER, FOR SOLUTION ORAL DAILY
Status: DISCONTINUED | OUTPATIENT
Start: 2022-08-24 | End: 2022-08-27

## 2022-08-24 RX ORDER — ATORVASTATIN CALCIUM 80 MG/1
80 TABLET, FILM COATED ORAL NIGHTLY
Status: DISCONTINUED | OUTPATIENT
Start: 2022-08-24 | End: 2022-09-01 | Stop reason: HOSPADM

## 2022-08-24 RX ORDER — TAMSULOSIN HYDROCHLORIDE 0.4 MG/1
0.4 CAPSULE ORAL DAILY
Status: DISCONTINUED | OUTPATIENT
Start: 2022-08-24 | End: 2022-09-01 | Stop reason: HOSPADM

## 2022-08-24 RX ORDER — ENOXAPARIN SODIUM 100 MG/ML
40 INJECTION SUBCUTANEOUS DAILY
Status: DISCONTINUED | OUTPATIENT
Start: 2022-08-24 | End: 2022-08-24 | Stop reason: DRUGHIGH

## 2022-08-24 RX ORDER — MIDODRINE HYDROCHLORIDE 5 MG/1
5 TABLET ORAL
Status: DISCONTINUED | OUTPATIENT
Start: 2022-08-24 | End: 2022-09-01 | Stop reason: HOSPADM

## 2022-08-24 RX ORDER — SPIRONOLACTONE 25 MG/1
TABLET ORAL
Status: ON HOLD | COMMUNITY
Start: 2022-07-20 | End: 2022-09-01 | Stop reason: HOSPADM

## 2022-08-24 RX ORDER — PREGABALIN 25 MG/1
25 CAPSULE ORAL 2 TIMES DAILY
Status: DISCONTINUED | OUTPATIENT
Start: 2022-08-24 | End: 2022-09-01 | Stop reason: HOSPADM

## 2022-08-24 RX ORDER — OXYCODONE HYDROCHLORIDE AND ACETAMINOPHEN 5; 325 MG/1; MG/1
1 TABLET ORAL EVERY 6 HOURS PRN
Status: DISCONTINUED | OUTPATIENT
Start: 2022-08-24 | End: 2022-08-28

## 2022-08-24 RX ORDER — ACETAMINOPHEN 325 MG/1
650 TABLET ORAL EVERY 6 HOURS PRN
Status: DISCONTINUED | OUTPATIENT
Start: 2022-08-24 | End: 2022-09-01 | Stop reason: HOSPADM

## 2022-08-24 RX ORDER — SODIUM CHLORIDE 9 MG/ML
INJECTION, SOLUTION INTRAVENOUS PRN
Status: DISCONTINUED | OUTPATIENT
Start: 2022-08-24 | End: 2022-09-01 | Stop reason: HOSPADM

## 2022-08-24 RX ORDER — ONDANSETRON 2 MG/ML
4 INJECTION INTRAMUSCULAR; INTRAVENOUS EVERY 6 HOURS PRN
Status: DISCONTINUED | OUTPATIENT
Start: 2022-08-24 | End: 2022-08-24

## 2022-08-24 RX ORDER — ONDANSETRON 4 MG/1
4 TABLET, ORALLY DISINTEGRATING ORAL EVERY 8 HOURS PRN
Status: DISCONTINUED | OUTPATIENT
Start: 2022-08-24 | End: 2022-08-24

## 2022-08-24 RX ORDER — SENNA PLUS 8.6 MG/1
1 TABLET ORAL NIGHTLY
Status: DISCONTINUED | OUTPATIENT
Start: 2022-08-24 | End: 2022-08-28

## 2022-08-24 RX ORDER — ENOXAPARIN SODIUM 100 MG/ML
30 INJECTION SUBCUTANEOUS 2 TIMES DAILY
Status: DISCONTINUED | OUTPATIENT
Start: 2022-08-24 | End: 2022-09-01 | Stop reason: HOSPADM

## 2022-08-24 RX ORDER — SODIUM CHLORIDE 0.9 % (FLUSH) 0.9 %
5-40 SYRINGE (ML) INJECTION EVERY 12 HOURS SCHEDULED
Status: DISCONTINUED | OUTPATIENT
Start: 2022-08-24 | End: 2022-09-01 | Stop reason: HOSPADM

## 2022-08-24 RX ORDER — HYDRALAZINE HYDROCHLORIDE 10 MG/1
TABLET, FILM COATED ORAL
Status: ON HOLD | COMMUNITY
Start: 2022-07-20 | End: 2022-09-01 | Stop reason: HOSPADM

## 2022-08-24 RX ORDER — DEXTROSE MONOHYDRATE 100 MG/ML
INJECTION, SOLUTION INTRAVENOUS CONTINUOUS PRN
Status: DISCONTINUED | OUTPATIENT
Start: 2022-08-24 | End: 2022-09-01 | Stop reason: HOSPADM

## 2022-08-24 RX ORDER — ALLOPURINOL 300 MG/1
300 TABLET ORAL DAILY
Status: DISCONTINUED | OUTPATIENT
Start: 2022-08-24 | End: 2022-09-01 | Stop reason: HOSPADM

## 2022-08-24 RX ORDER — PROCHLORPERAZINE EDISYLATE 5 MG/ML
10 INJECTION INTRAMUSCULAR; INTRAVENOUS EVERY 6 HOURS PRN
Status: DISCONTINUED | OUTPATIENT
Start: 2022-08-24 | End: 2022-09-01 | Stop reason: HOSPADM

## 2022-08-24 RX ORDER — INSULIN ASPART 100 [IU]/ML
INJECTION, SOLUTION INTRAVENOUS; SUBCUTANEOUS
Status: ON HOLD | COMMUNITY
Start: 2022-08-22 | End: 2022-09-01 | Stop reason: HOSPADM

## 2022-08-24 RX ORDER — FLUTICASONE PROPIONATE 50 MCG
1 SPRAY, SUSPENSION (ML) NASAL DAILY
Status: DISCONTINUED | OUTPATIENT
Start: 2022-08-24 | End: 2022-09-01 | Stop reason: HOSPADM

## 2022-08-24 RX ORDER — POTASSIUM CHLORIDE 20 MEQ/1
20 TABLET, EXTENDED RELEASE ORAL ONCE
Status: DISCONTINUED | OUTPATIENT
Start: 2022-08-24 | End: 2022-08-24

## 2022-08-24 RX ORDER — INSULIN LISPRO 100 [IU]/ML
0-8 INJECTION, SOLUTION INTRAVENOUS; SUBCUTANEOUS
Status: DISCONTINUED | OUTPATIENT
Start: 2022-08-24 | End: 2022-08-25

## 2022-08-24 RX ORDER — PANTOPRAZOLE SODIUM 40 MG/1
40 TABLET, DELAYED RELEASE ORAL
Status: DISCONTINUED | OUTPATIENT
Start: 2022-08-25 | End: 2022-09-01 | Stop reason: HOSPADM

## 2022-08-24 RX ORDER — CARVEDILOL 3.12 MG/1
3.12 TABLET ORAL 2 TIMES DAILY WITH MEALS
Status: DISCONTINUED | OUTPATIENT
Start: 2022-08-24 | End: 2022-09-01 | Stop reason: HOSPADM

## 2022-08-24 RX ORDER — POTASSIUM CHLORIDE 20 MEQ/1
20 TABLET, EXTENDED RELEASE ORAL DAILY
COMMUNITY
Start: 2022-07-29

## 2022-08-24 RX ORDER — INSULIN LISPRO 100 [IU]/ML
0-4 INJECTION, SOLUTION INTRAVENOUS; SUBCUTANEOUS NIGHTLY
Status: DISCONTINUED | OUTPATIENT
Start: 2022-08-24 | End: 2022-08-25

## 2022-08-24 RX ORDER — INSULIN GLARGINE 100 [IU]/ML
35 INJECTION, SOLUTION SUBCUTANEOUS NIGHTLY
Status: DISCONTINUED | OUTPATIENT
Start: 2022-08-24 | End: 2022-08-25

## 2022-08-24 RX ADMIN — OXYCODONE AND ACETAMINOPHEN 1 TABLET: 325; 5 TABLET ORAL at 14:27

## 2022-08-24 RX ADMIN — FERROUS SULFATE TAB 325 MG (65 MG ELEMENTAL FE) 325 MG: 325 (65 FE) TAB at 22:38

## 2022-08-24 RX ADMIN — SODIUM CHLORIDE, PRESERVATIVE FREE 10 ML: 5 INJECTION INTRAVENOUS at 22:30

## 2022-08-24 RX ADMIN — SENNOSIDES 8.6 MG: 8.6 TABLET, COATED ORAL at 22:36

## 2022-08-24 RX ADMIN — MIDODRINE HYDROCHLORIDE 5 MG: 5 TABLET ORAL at 17:06

## 2022-08-24 RX ADMIN — FUROSEMIDE 10 MG/HR: 10 INJECTION, SOLUTION INTRAMUSCULAR; INTRAVENOUS at 13:53

## 2022-08-24 RX ADMIN — ENOXAPARIN SODIUM 30 MG: 100 INJECTION SUBCUTANEOUS at 22:36

## 2022-08-24 RX ADMIN — ATORVASTATIN CALCIUM 80 MG: 80 TABLET, FILM COATED ORAL at 22:38

## 2022-08-24 RX ADMIN — POLYETHYLENE GLYCOL 3350 17 G: 17 POWDER, FOR SOLUTION ORAL at 14:27

## 2022-08-24 RX ADMIN — PREGABALIN 25 MG: 25 CAPSULE ORAL at 22:36

## 2022-08-24 RX ADMIN — INSULIN GLARGINE 35 UNITS: 100 INJECTION, SOLUTION SUBCUTANEOUS at 22:36

## 2022-08-24 RX ADMIN — FLUTICASONE PROPIONATE 1 SPRAY: 50 SPRAY, METERED NASAL at 17:06

## 2022-08-24 RX ADMIN — FUROSEMIDE 10 MG/HR: 10 INJECTION, SOLUTION INTRAMUSCULAR; INTRAVENOUS at 23:37

## 2022-08-24 RX ADMIN — INSULIN LISPRO 4 UNITS: 100 INJECTION, SOLUTION INTRAVENOUS; SUBCUTANEOUS at 17:32

## 2022-08-24 RX ADMIN — POTASSIUM CHLORIDE 20 MEQ: 1500 TABLET, EXTENDED RELEASE ORAL at 13:46

## 2022-08-24 RX ADMIN — ENOXAPARIN SODIUM 30 MG: 100 INJECTION SUBCUTANEOUS at 13:46

## 2022-08-24 ASSESSMENT — PAIN SCALES - GENERAL
PAINLEVEL_OUTOF10: 0
PAINLEVEL_OUTOF10: 8
PAINLEVEL_OUTOF10: 8
PAINLEVEL_OUTOF10: 0

## 2022-08-24 NOTE — H&P
Hospital Medicine History & Physical      PCP: No primary care provider on file. Date of Admission: 8/24/2022    Date of Service: Pt seen/examined on 8/24/22 and admitted to inpatient with expected LOS greater than two midnights due to medical therapy. Chief Complaint:  shortness of breath, weight gain      History Of Present Illness:  75 y.o. male with past medical history significant for CHF, CAD, CKD, DM2 with peripheral neuropathy, hypertension, hyperlipidemia, morbid obesity, constipation, chronic pain, CAD s/p CABG. He was in the ER 8/18 for similar complaints and left AMA due to wait time (he sated 32 hours). He went to his cardiologist appointment today for worsening BLE edema, dyspnea, and fluid in his abdomen. He was direct admitted from there in order to by-pass the ED. He reports a 20lb weight gain. He denies denies chest pain, n/v/d, dysuria, fever, chills, and headache; reports neuropathy, and constipation. There is no radiation of symptoms. No aggravating or alleviating factors.      Past Medical History:          Diagnosis Date    Anemia     Angina     Arthritis     CAD (coronary artery disease)     CHF (congestive heart failure) ([facility])     CKD (chronic kidney disease) stage 3, GFR 30-59 ml/min ([facility])     Clostridium difficile diarrhea 3/16/12; 2/29/12    positive stool toxin    Diabetes mellitus ([address]     Diabetic neuropathy ([address]     feet and legs    Disease of blood and blood forming organ     Dizziness     When he moves his head/ loses his balance    GERD (gastroesophageal reflux disease)     gastric ulcer    Headache     Hearing loss     Hyperlipidemia     Hypertension     Kidney stone 2002    Refusal of blood transfusions as patient is Yazidism     Sleep apnea     Tinnitus     Venous ulcer ([address]     LLE    Wound, open 1/13/2012       Past Surgical History:          Procedure Laterality Date    CARDIAC SURGERY      Dec 27 2010, triple bypass    CHOLECYSTECTOMY  9/2011 HERNIA REPAIR  age2    OTHER SURGICAL HISTORY  11-16-11 REPAIR LOWER STERNAL INCISION, REMOVAL OF ONE STERNAL WIRE,    OTHER SURGICAL HISTORY  10/10/2014    phacoemulsification of cataract with intraocular lens implant left eye    PAIN MANAGEMENT PROCEDURE N/A 2/10/2022    MIDLINE CERVICAL SIX SEVEN EPIDURAL STEROID INJECTION SITE CONFIRMED BY FLUOROSCOPY performed by Chanell Bucio MD at [facility] 18 N/A 7/21/2022    MIDLINE TO RIGHT CERVICAL SIX SEVEN EPIDURAL STEROID INJECTION SITE CONFIRMED BY FLUOROSCOPY performed by Chanell Bucio MD at [address] [place] [address] ENDOSCOPY         Medications Prior to Admission:      Prior to Admission medications    Medication Sig Start Date End Date Taking?  Authorizing Provider   potassium chloride (KLOR-CON M) 20 MEQ extended release tablet  7/29/22   Historical Provider, MD   spironolactone (ALDACTONE) 25 MG tablet  7/20/22   Historical Provider, MD   meclizine (ANTIVERT) 25 MG tablet  7/27/22   Historical Provider, MD   hydrALAZINE (APRESOLINE) 10 MG tablet  7/20/22   Historical Provider, MD   Tay Flank 100 UNIT/ML injection pen  8/22/22   Historical Provider, MD   torsemide (DEMADEX) 100 MG tablet Take 1 tablet by mouth in the morning and at bedtime 8/23/22   PATRICK Sutton CNP   metOLazone (ZAROXOLYN) 5 MG tablet 1 tab twice a week (Monday and fridays) 7/14/22   PATRICK Sutton CNP   Sodium Phosphates (FLEET) 7-19 GM/118ML Place 1 enema rectally daily as needed    Historical Provider, MD   glucagon 1 MG injection Inject 1 kit into the muscle once Emergencies only    Historical Provider, MD   Glycerin, Laxative, (GLYCERIN, ADULT,) 2 g SUPP suppository Place 1 suppository rectally as needed  Patient not taking: Reported on 8/24/2022    Historical Provider, MD   loperamide (IMODIUM) 2 MG capsule Take 2 mg by mouth as needed for Diarrhea    Historical Provider, MD   magnesium hydroxide (MILK OF MAGNESIA) 400 MG/5ML suspension Take by mouth as needed for Constipation    Historical Provider, MD   oxyCODONE-acetaminophen (PERCOCET) 5-325 MG per tablet Take 1 tablet by mouth every 6 hours as needed for Pain. Historical Provider, MD   pregabalin (LYRICA) 25 MG capsule Take 1 capsule by mouth 2 times daily for 3 days. Patient taking differently: Take 25 mg by mouth 2 times daily.  Patient is still taking this medication through nursing home pharmacy as of 6/6/22 5/29/22 8/24/22  Je Flores MD   ondansetron (ZOFRAN-ODT) 4 MG disintegrating tablet  5/16/22   Historical Provider, MD   RHOPRESSA 0.02 % SOLN  5/19/22   Historical Provider, MD   dorzolamide (TRUSOPT) 2 % ophthalmic solution  5/21/22   Historical Provider, MD   brimonidine-timolol (COMBIGAN) 0.2-0.5 % ophthalmic solution  5/20/22   Historical Provider, MD   isosorbide mononitrate (IMDUR) 30 MG extended release tablet Take 1 tablet by mouth daily 5/26/22   PATRICK Ash CNP   insulin glargine (LANTUS SOLOSTAR) 100 UNIT/ML injection pen Inject 35 Units into the skin daily 4/9/22   Joseph Hope MD   carvedilol (COREG) 3.125 MG tablet Take 1 tablet by mouth 2 times daily (with meals) 4/9/22   Joseph Hope MD   insulin lispro (HUMALOG) 100 UNIT/ML injection vial Inject 10 Units into the skin 3 times daily (with meals) 4/9/22   Joseph Hope MD   linaclotide Monique Ohms) 290 MCG CAPS capsule Take 1 capsule by mouth every morning (before breakfast) 4/10/22   Joseph Hope MD   tamsulosin [facility]) 0.4 MG capsule Take 0.4 mg by mouth Daily 12/30/21   Historical Provider, MD   atorvastatin (LIPITOR) 80 MG tablet Take 1 tablet by mouth nightly 3/14/22   Kalani Gandara APRLITTEL - CNP   allopurinol (ZYLOPRIM) 300 MG tablet Take 1 tablet by mouth daily 3/15/22   Mike Rueda MD   naloxone 4 MG/0.1ML LIQD nasal spray 1 spray by Nasal route as needed for Opioid Reversal 11/23/21   Maxim Bhardwaj MD   acetaminophen (TYLENOL) 500 MG tablet Take 2 tablets by mouth 4 times daily as needed for Pain 9/17/20   Candelario Jack, APRN - CNP   senna (SENOKOT) 8.6 MG TABS tablet Take 1 tablet by mouth 2 times daily 7/8/19   PATRICK Medeiros CNP   docusate sodium (COLACE, DULCOLAX) 100 MG CAPS Take 100 mg by mouth 2 times daily 5/1/18   PATRICK Martini CNP   Compression Stockings MISC by Does not apply route 2 pair, knee high compression stockings, fitted/ zippered 6/15/16   PATRICK Guzman CNP   silver sulfADIAZINE (SILVADENE) 1 % cream Apply to BL lower leg ulcers daily 2/9/16   Malcom Ramirez MD   nitroGLYCERIN (NITROSTAT) 0.4 MG SL tablet Place 1 tablet under the tongue every 5 minutes as needed  Patient not taking: Reported on 8/24/2022 8/17/15   PATRICK Guzman CNP   fluticasone (FLONASE) 50 MCG/ACT nasal spray 1 spray by Nasal route nightly as needed. Historical Provider, MD   ferrous sulfate 325 (65 FE) MG tablet Take 325 mg by mouth 2 times daily  12/22/10   Historical Provider, MD   omeprazole (PRILOSEC) 20 MG delayed release capsule Take 20 mg by mouth 2 times daily. 12/22/10   Historical Provider, MD       Allergies:  Amitriptyline, Celecoxib, Cymbalta [duloxetine hcl], Elavil [amitriptyline hcl], Gabapentin, and Nsaids    Social History:      The patient currently lives at [facility]:   reports that he quit smoking about 34 years ago. His smoking use included cigarettes. He has a 16.00 pack-year smoking history. He has never used smokeless tobacco.  ETOH:   reports no history of alcohol use. E-cigarette/Vaping       Questions Responses    E-cigarette/Vaping Use Never User    Start Date     Passive Exposure     Quit Date     Counseling Given     Comments               Family History:      Reviewed and negative in regards to presenting illness/complaint.         Problem Relation Age of Onset    Heart Disease Mother     Heart Disease Father     Cancer Father     Diabetes Brother     Diabetes Brother REVIEW OF SYSTEMS COMPLETED:   Pertinent positives as noted in the HPI. All other systems reviewed and negative. PHYSICAL EXAM PERFORMED:    BP (!) 96/44   Temp 98.4 °F (36.9 °C) (Oral)   Resp 18   Wt (!) 302 lb 3.2 oz (137.1 kg)   SpO2 96%   BMI 44.63 kg/m²     General appearance: Resting in bed, lying on left side. No apparent distress, appears stated age and cooperative. Obese. HEENT: Normal cephalic, atraumatic without obvious deformity. Pupils equal, round, and reactive to light. Extra ocular muscles intact. Conjunctivae/corneas clear. Neck: Supple, with full range of motion. No jugular venous distention. Trachea midline. Respiratory: Dyspnea at rest. Bibasilar crackles, other lung fields diminished. 4L per NC  Cardiovascular: Regular rate and rhythm with normal S1/S2 without murmurs, rubs or gallops. Abdomen: Soft, non-tender, non-distended with normal bowel sounds. Fluid filled  Musculoskeletal: No clubbing, cyanosis bilaterally. BLE with 3+ edema, and fluid filled areas of skin. No weeping at this time. Full range of motion without deformity. Skin: Skin color, texture, turgor normal. No rashes or lesions. Neurologic: Neurovascularly intact without any focal sensory/motor deficits. Cranial nerves: II-XII intact, grossly non-focal.  Psychiatric: Alert and oriented, thought content appropriate, normal insight  Capillary Refill: Brisk,3 seconds, normal  Peripheral Pulses: +2 palpable, equal bilaterally       Labs:     Recent Labs     08/24/22  1216   WBC 6.2   HGB 10.7*   HCT 33.5*        Recent Labs     08/24/22  1216   *   K 3.4*   CL 91*   CO2 32   *   CREATININE 1.9*   CALCIUM 9.4   PHOS 4.1     No results for input(s): AST, ALT, BILIDIR, BILITOT, ALKPHOS in the last 72 hours. No results for input(s): INR in the last 72 hours. No results for input(s): Rosie Shanks in the last 72 hours.     Urinalysis:      Lab Results   Component Value Date/Time    [facility] Negative 05/01/2022 09:53 PM    WBCUA 0-2 11/05/2013 10:31 AM    BACTERIA Rare 11/05/2013 10:31 AM    RBCUA 0-2 11/05/2013 10:31 AM    BLOODU Negative 05/01/2022 09:53 PM    SPECGRAV 1.010 05/01/2022 09:53 PM    GLUCOSEU Negative 05/01/2022 09:53 PM    GLUCOSEU NEGATIVE 02/29/2012 03:17 PM       Radiology:     CT ABDOMEN PELVIS WO CONTRAST Additional Contrast? None    (Results Pending)       Consults:    IP CONSULT TO HEART FAILURE NURSE/COORDINATOR  IP CONSULT TO DIETITIAN  IP CONSULT TO NEPHROLOGY  IP CONSULT TO CARDIOLOGY    ASSESSMENT & PLAN:    C/Jasmeet Narayanan 110[address] Problems    Diagnosis Date Noted    Fluid overload [E87.70] 08/24/2022     Priority: Medium     Acute on chronic diastolic heart failure  -Echo 1/2022: EF 50%; mod AS. Previous mention of elevated filling pressure  -Repeat echo ordered  -Daily weights. On admission 137.1kg  -Previous recent low of 292lb (per chart review)  -Strict I&O - pt refuses garcia  -No ACE d/t renal disease  -Continue beta blocker  -BNP on admission 6892  -CHF nurse consulted  -Cardiac diet w/ 1500mL fluid restriction  -Cardiology consulted, appreciate recommendations  -BLE to be wrapped with ACE wraps    Acute kidney injury on CKD stage 3b  -Baseline creat 1.5-1.7.  On admission, 1.9  -Likely due to venous congestion 2/2 heart failure  -Avoid nephrotoxics as able  -Nephrology following, appreciate recommendations  -Diuresis per nephrology   -Monitor    Azotemia  - on admission  -Nephro is following  -Monitor    Hypokalemia  -Monitor and replete as needed  -Monitor on telemetry  -Please call for replacement as needed, given renal disease will not place standing orders    Hyponatremia, mild  -Na 134 on arrival  -Monitor    Diabetes type II, sub optimal control, with peripheral neuropathy  -A1C 7 (4/2022)  -Basal bolus and sliding scale insulin, monitor and adjust as needed  -Carb control diet  -Continue home dose lyrica    Chronic constipation  -Daily miralax, senna    History of hypertension  -BP soft on admission  -Per renal, ideally keep SBP >120  -Add midodrine  -Monitor    Ascites  -CT abd ordered, follow for results  -Continue diuresis per renal    Hyperlipidemia, controlled  -LDL 65 (4/2022)  -Continue high-intensity statin therapy    Chronic hypoxic respiratory failure  -Wears 4L at baseline  -Stable on this at this time  -Monitor    Chronic pain  -Continue home regimen    Goals of care  -Discussed code status with pt, as during previous admission he was DNR-CCA. At this time, pt chose to allow for compressions, medications, and defibrillation. But does not want intubated. Order placed for Limited code with no intubation checked  -Can continue these discussions as needed    Morbid obesity  -Body mass index is 18.32 kg/m².   -Complicating assessment and treatment. Placing patient at risk for multiple co-morbidities as well as early death and contributing to the patient's presentation  -Counseled on weight loss. DVT Prophylaxis: lovenox  Diet: ADULT DIET; Regular; 4 carb choices (60 gm/meal); Low Sodium (2 gm)  Code Status: Limited    PT/OT Eval Status: consulted    Dispo - >2 days       PATRICK Wisdom - CNP    Thank you No primary care provider on file. for the opportunity to be involved in this patient's care. If you have any questions or concerns please feel free to contact me at [phone].

## 2022-08-24 NOTE — CONSULTS
Patient seen and examined, consult note dictated. Assessment and Plan:    1- A/CKD: The patient has chronic kidney disease with a baseline creatinine of 1.5 to 1.7 mg/dl. His MARGARITO is likely secondary to venous congestion in the setting of diastolic heart failure decompensation.  - Consider IV Lasix drip at 10 mg/h. - Avoid all nephrotoxic agents at this time. - Little catheter for strict intake and output. - Maintain systolic blood pressure > 120 mmHg. 2- Hypokalemia: PRN potassium replacement. 3- HTN: Blood pressure relatively low, we will apply parameters. 4- Anemia: Stable hemoglobin, monitor.

## 2022-08-24 NOTE — PLAN OF CARE
patient's Current Level of Mobility?: Ambulatory- with Assistance  How was this patient Mobilized today?: Edge of Bed, Up to Chair,  Up to Toilet/Shower, and Up in Room, ambulated 20 ft                 With Whom? Nurse and PCA                 Level of Difficulty/Assistance: 1x Assist     Pt up in chair at this time on  4 L O2. Pt with complaints of shortness of breath. Pt with pitting lower extremity edema.      Patient and/or Family's stated Goal of Care this Admission: reduce shortness of breath, increase activity tolerance, better understand heart failure and disease management, be more comfortable, and reduce lower extremity edema prior to discharge        :

## 2022-08-24 NOTE — PROGRESS NOTES
[address]   Cardiac Follow-up    Primary Care Doctor:  No primary care provider on file. Chief Complaint   Patient presents with    Follow-up    Congestive Heart Failure          History of Present Illness:   I had the pleasure of seeing Marianne Case in follow up for diastolic heart failure, HTN, CAD s/p CABG, pHTN, CKD. Since last visit, he was seen in the ER on 8/18/22. Admission was recommended, however he left AMA as he was in the ER for 8 hours and didn't want a garcia catheter. Continues to reside at [facility]   I discussed with the nursing home on 8/19/22 and torsemide was adjusted to 100mg BID. Repeat labs on Monday showed . Similar to the labs on 8/18/22. He is very swollen depsite increasing the torsemide to 100mg twice a day. Abd full of fluid, leg edema worse. Abd cramping. Short of breath   He is open to admission to the hospital if he doesn't have to go through the ER and if they do not put a garcia catheter in him. Triny Townsend describes symptoms including dyspnea, edema but denies syncope. Home weights: 302 lbs at the nursing home. NYHA:   IV  ACC/ AHA Stage:    C      Past Medical History:   has a past medical history of Anemia, Angina, Arthritis, CAD (coronary artery disease), CHF (congestive heart failure) ([address] CKD (chronic kidney disease) stage 3, GFR 30-59 ml/min ([facility]), Clostridium difficile diarrhea, Diabetes mellitus ([address] Diabetic neuropathy ([address] Disease of blood and blood forming organ, Dizziness, GERD (gastroesophageal reflux disease), Headache, Hearing loss, Hyperlipidemia, Hypertension, Kidney stone, Refusal of blood transfusions as patient is Taoism, Sleep apnea, Tinnitus, Venous ulcer ([address] and Wound, open. Surgical History:   has a past surgical history that includes hernia repair (age2);  Cholecystectomy (9/2011); other surgical history (11-16-11 REPAIR LOWER STERNAL INCISION, REMOVAL OF ONE STERNAL WIRE,); Upper MD   ondansetron (ZOFRAN-ODT) 4 MG disintegrating tablet  5/16/22  Yes Historical Provider, MD   RHOPRESSA 0.02 % SOLN  5/19/22  Yes Historical Provider, MD   dorzolamide (TRUSOPT) 2 % ophthalmic solution  5/21/22  Yes Historical Provider, MD   brimonidine-timolol (COMBIGAN) 0.2-0.5 % ophthalmic solution  5/20/22  Yes Historical Provider, MD   isosorbide mononitrate (IMDUR) 30 MG extended release tablet Take 1 tablet by mouth daily 5/26/22  Yes PATRICK Reyes CNP   insulin glargine (LANTUS SOLOSTAR) 100 UNIT/ML injection pen Inject 35 Units into the skin daily 4/9/22  Yes Renan Campbell MD   carvedilol (COREG) 3.125 MG tablet Take 1 tablet by mouth 2 times daily (with meals) 4/9/22  Yes Renan Campbell MD   insulin lispro (HUMALOG) 100 UNIT/ML injection vial Inject 10 Units into the skin 3 times daily (with meals) 4/9/22  Yes Renan Campbell MD   linaclotide [place]) 290 MCG CAPS capsule Take 1 capsule by mouth every morning (before breakfast) 4/10/22  Yes Renan Campbell MD   tamsulosin (FLOMAX) 0.4 MG capsule Take 0.4 mg by mouth Daily 12/30/21  Yes Historical Provider, MD   atorvastatin (LIPITOR) 80 MG tablet Take 1 tablet by mouth nightly 3/14/22  Yes PATRICK Reyes CNP   allopurinol (ZYLOPRIM) 300 MG tablet Take 1 tablet by mouth daily 3/15/22  Yes Wiley Brown MD   acetaminophen (TYLENOL) 500 MG tablet Take 2 tablets by mouth 4 times daily as needed for Pain 9/17/20  Yes PATRICK Hayward CNP   senna (SENOKOT) 8.6 MG TABS tablet Take 1 tablet by mouth 2 times daily 7/8/19  Yes PATRICK Medeiros CNP   docusate sodium (COLACE, DULCOLAX) 100 MG CAPS Take 100 mg by mouth 2 times daily 5/1/18  Yes PATRICK Martini CNP   silver sulfADIAZINE (SILVADENE) 1 % cream Apply to BL lower leg ulcers daily 2/9/16  Yes Malcom Ramirez MD   fluticasone (FLONASE) 50 MCG/ACT nasal spray 1 spray by Nasal route nightly as needed.    Yes Historical Provider, MD   ferrous sulfate 325 (65 FE) MG tablet Take 325 mg by mouth 2 times daily  12/22/10  Yes Historical Provider, MD   omeprazole (PRILOSEC) 20 MG delayed release capsule Take 20 mg by mouth 2 times daily.  12/22/10  Yes Historical Provider, MD   spironolactone (ALDACTONE) 25 MG tablet  7/20/22   Historical Provider, MD   meclizine (ANTIVERT) 25 MG tablet  7/27/22   Historical Provider, MD   hydrALAZINE (APRESOLINE) 10 MG tablet  7/20/22   Historical Provider, MD Marijane Blizzard 100 UNIT/ML injection pen  8/22/22   Historical Provider, MD   Glycerin, Laxative, (GLYCERIN, ADULT,) 2 g SUPP suppository Place 1 suppository rectally as needed  Patient not taking: Reported on 8/24/2022    Historical Provider, MD   naloxone 4 MG/0.1ML LIQD nasal spray 1 spray by Nasal route as needed for Opioid Reversal 11/23/21   Frankie Hidalgo MD   Compression Stockings MISC by Does not apply route 2 pair, knee high compression stockings, fitted/ zippered 6/15/16   PATRICK Montes CNP   nitroGLYCERIN (NITROSTAT) 0.4 MG SL tablet Place 1 tablet under the tongue every 5 minutes as needed  Patient not taking: Reported on 8/24/2022 8/17/15   PATRICK Montes CNP        Allergies:  Amitriptyline, Celecoxib, Cymbalta [duloxetine hcl], Elavil [amitriptyline hcl], Gabapentin, and Nsaids     Physical Examination:    Vitals:    08/24/22 0853   BP: 120/64   Pulse: 73   SpO2: 96%   Weight: (!) 302 lb (137 kg)   Height: 5' 9\" (1.753 m)          Constitutional and General Appearance: no apparent distress, obese  HEENT: non-icteric sclera, mask in place, disconjugate gaze  Neck: JVP difficult to assess   Respiratory:  No use of accessory muscles  Diminished breath sounds throughout, no wheezing, no crackles, no rhonchi  Cardiovascular:  + 3/6 high pitched systolic murmur loudest at second right sternal border, no rub or gallop  Regular rate and rhythm, S1,S2 distant  Radial pulses 2+ and equal bilaterally  +  2 BLE edema BLE  Abdomen: obese, anasarca of abd No masses or tenderness, + edema of pannus   Liver: No Abnormalities Noted  Musculoskeletal/Skin:  Wheelchair   There is no clubbing, cyanosis of the extremities  Skin is warm and dry  Moves all extremities   Neurological/Psychiatric:  Alert and oriented in all spheres  No abnormalities of mood, affect, memory, mentation, or behavior are noted    Lab Data:  CBC:   Lab Results   Component Value Date/Time    WBC 5.5 08/18/2022 03:28 PM    WBC 6.7 05/30/2022 07:31 AM    WBC 6.9 05/28/2022 10:57 PM    RBC 3.69 08/18/2022 03:28 PM    RBC 3.90 05/30/2022 07:31 AM    RBC 4.06 05/28/2022 10:57 PM    HGB 9.8 08/18/2022 03:28 PM    HGB 10.4 05/30/2022 07:31 AM    HGB 10.8 05/28/2022 10:57 PM    HCT 30.6 08/18/2022 03:28 PM    HCT 32.6 05/30/2022 07:31 AM    HCT 33.8 05/28/2022 10:57 PM    MCV 83.0 08/18/2022 03:28 PM    MCV 83.6 05/30/2022 07:31 AM    MCV 83.1 05/28/2022 10:57 PM    RDW 19.4 08/18/2022 03:28 PM    RDW 18.0 05/30/2022 07:31 AM    RDW 18.0 05/28/2022 10:57 PM     08/18/2022 03:28 PM     05/30/2022 07:31 AM     05/28/2022 10:57 PM     BMP:   Lab Results   Component Value Date/Time     08/18/2022 03:28 PM     05/30/2022 07:30 AM     05/28/2022 10:57 PM    K 4.1 08/18/2022 03:28 PM    K 3.8 05/30/2022 07:30 AM    K 3.8 05/28/2022 10:57 PM    K 3.8 05/26/2022 12:00 AM    K 4.6 04/25/2022 08:26 AM    K 4.7 04/24/2022 10:01 PM    CL 91 08/18/2022 03:28 PM    CL 90 05/30/2022 07:30 AM    CL 94 05/28/2022 10:57 PM    CO2 27 08/18/2022 03:28 PM    CO2 30 05/30/2022 07:30 AM    CO2 30 05/28/2022 10:57 PM    PHOS 3.4 05/03/2022 08:46 AM    PHOS 3.7 05/02/2022 08:17 AM    PHOS 3.6 05/01/2022 08:07 AM     08/18/2022 03:28 PM    BUN 84 05/30/2022 07:30 AM    BUN 82 05/28/2022 10:57 PM    CREATININE 1.9 08/18/2022 03:28 PM    CREATININE 2.0 05/30/2022 07:30 AM    CREATININE 1.8 05/28/2022 10:57 PM     BNP:   Lab Results   Component Value Date/Time    PROBNP 6,526 08/18/2022 03:28 PM    PROBNP 4,695 05/01/2022 08:07 AM    PROBNP 4,316 04/28/2022 06:10 AM       Recent Testing:  Echo 9/2016  Left ventricular systolic function is normal with ejection fraction   estimated at 55 %. Diastolic septal flattening consistent with right ventricular overload. Diastolic filling parameters suggests grade II diastolic dysfunction. Mild aortic stenosis. Right ventricle is mildly enlarged. Right ventricular systolic function is normal, TAPSE 19 mm. Inadequate tricuspid regurgitation jet to estimate systolic pulmonary artery   pressure (SPAP). Echocardiogram 1/13/2022   Summary   Limited only f/u for LVEF and aortic valve stenosis. Technically difficult examination. Low normal left ventricular systolic function with ejection fraction of 50%. Moderate aortic stenosis. Aortic stenosis values appear approximately same when compared to echo on 8-. Mild to moderate aortic regurgitation. Assessment:   chronic diastolic heart failure  CAD s/p CABG x3  PAF; patient declines anticoagulation is willing to accept stroke risk per year  Moderate aortic stenosis  CKD - baseline creatinine 1.7   chronic hypoxemic respiratory failure  Severe HENNA  Diabetes  HLD  HTN  Anemia, patient is Sabianism  Fall s/p Subdural hematoma 5/28/22  Chronic venous stasis       1. Hypervolemia, unspecified hypervolemia type    2. Acute on chronic diastolic heart failure (HCC)    3. Stage 4 chronic kidney disease ([address]    4. Essential hypertension    5.  Aortic valve stenosis, etiology of cardiac valve disease unspecified      Plan:   Call placed to access center for acceptance of patient as direct admission for CHF/CKD/cardiorenal syndrome/volume overload despite torsemide 100mg BID and metolazone twice a week  Spoke with access center; spoke with hospitalist who accepted pt for admission- appreciate assistance  I called the nursing home and spoke with [place] to get updated med list along with updated her on patient admission  Patient was transported via wheelchair to registration by medical staff. Likely need lasix infusion at 10mg/hr with nephrology consult. I appreciate the opportunity for caring for this patient.      PATRICK Flaherty - CNP, CNP, 8/24/2022, 12:34 PM

## 2022-08-24 NOTE — CONSULTS
[facility]                                  CONSULTATION    PATIENT NAME: Roma KILLIAN                    :        1955  MED REC NO:   [id number]                          ROOM:       0207  ACCOUNT NO:   [de-identified]                           ADMIT DATE: 2022  PROVIDER:     Yasemin Soria MD    CONSULT DATE:  2022    REASON FOR CONSULTATION:  Acute-on-chronic kidney disease. HISTORY OF PRESENT ILLNESS:  The patient is a 79-year-old  male  patient with past medical history significant for chronic kidney disease  and a baseline creatinine ranging between 1.5 and 1.7 mg/dL. The  patient presented to his outpatient cardiology office appointment and  was noted to have significantly volume overloaded with significant  peripheral edema and abdominal distention. His renal panel revealed an  acute-on-chronic kidney injury with an elevated BUN of 111, which  prompted his presentation to the hospital for further admission and  management. Nephrology was consulted for further assistance with  diuresis. PAST MEDICAL HISTORY:  1. Chronic kidney disease. 2.  Diastolic heart failure. 3.  Coronary artery disease. 4.  Osteoarthritis. 5.  Diabetes mellitus. 6.  Hyperlipidemia. PAST SURGICAL HISTORY:  1. Cholecystectomy. 2.  Hernia repair. ALLERGIES:  The patient is allergic to AMITRIPTYLINE, CELECOXIB,  CYMBALTA, ELAVIL, GABAPENTIN, and NONSTEROIDAL ANTI-INFLAMMATORY DRUGS. SOCIAL HISTORY:  The patient quit smoking many years ago and does not  drink alcohol. FAMILY HISTORY:  Significant for coronary artery disease and diabetes  mellitus. REVIEW OF SYSTEMS:  The patient denies any fever, chills, cough or  expectorations. Otherwise, a 10-point review of systems was relatively  unremarkable.     PHYSICAL EXAMINATION:  VITAL SIGNS:  Blood pressure 96/44, heart rate 82, respirations 16,  temperature 98.4 Fahrenheit. The patient is satting 96% on 4 liters  nasal cannula. GENERAL APPEARANCE:  The patient is alert and oriented x3, not in acute  distress. HEENT:  Eyes revealed normal conjunctivae, reactive pupils. NECK:  Revealed midline trachea, nonpalpable thyroid. LUNGS:  Clear to anterior auscultation bilaterally. Nonlabored  breathing. CARDIOVASCULAR:  Exam revealed S1 and S2, regular rate and rhythm. No  murmurs or rubs. 2+ lower extremities pitting edema. ABDOMEN:  Exam revealed a distended abdomen. No organomegaly. SKIN:  Revealed no lesions or rashes. Warm to touch. PSYCHIATRIC:  Revealed good judgment and insight. LYMPHATICS:  Revealed no cervical or axillary adenopathies. ASSESSMENT AND PLAN:  1. Acute-on-chronic kidney disease. The patient has chronic kidney  disease with a baseline creatinine of 1.5 to 1.7 mg/dL. His acute  kidney injury is likely secondary to venous congestion in the setting of  diastolic heart failure decompensation. RECOMMENDATIONS:  1. Consider IV Lasix drip at 10 mg per hour. 2.  Avoid all nephrotoxic agents at this time. 3.  Little catheter for strict intake and output. 4.  Maintain systolic blood pressure above 120 mmHg. 5.  Hypokalemia, p.r.n. potassium replacement. 6.  Hypertension, blood pressure relatively low, we will apply  parameters. 7.  Anemia, stable hemoglobin, monitor.         Vee Bhardwaj MD    D: 08/24/2022 15:51:07       T: 08/24/2022 15:54:40     [email or web address]  Job#: [id number]     Doc#: [id number]    CC:

## 2022-08-24 NOTE — TELEPHONE ENCOUNTER
Consult: Mendel Landsberg 1955. . CHF exacerbation. Brandy Silence [phone].  Pt sent from office this AM

## 2022-08-25 LAB
ALBUMIN SERPL-MCNC: 3.9 G/DL (ref 3.4–5)
ALP BLD-CCNC: 122 U/L (ref 40–129)
ALT SERPL-CCNC: 8 U/L (ref 10–40)
ANION GAP SERPL CALCULATED.3IONS-SCNC: 12 MMOL/L (ref 3–16)
AST SERPL-CCNC: 13 U/L (ref 15–37)
B-TYPE NATRIURETIC PEPTIDE: 517.3 PG/ML
BASOPHILS ABSOLUTE: 0 K/UL (ref 0–0.2)
BASOPHILS RELATIVE PERCENT: 0.5 %
BILIRUB SERPL-MCNC: 1 MG/DL (ref 0–1)
BILIRUBIN DIRECT: 0.4 MG/DL (ref 0–0.3)
BILIRUBIN, INDIRECT: 0.6 MG/DL (ref 0–1)
BUN BLDV-MCNC: 111 MG/DL (ref 7–20)
BUN BLDV-MCNC: 112 MG/DL
CALCIUM SERPL-MCNC: 9.1 MG/DL
CALCIUM SERPL-MCNC: 9.5 MG/DL (ref 8.3–10.6)
CHLORIDE BLD-SCNC: 92 MMOL/L (ref 99–110)
CHLORIDE BLD-SCNC: 93 MMOL/L
CHOLESTEROL, TOTAL: 95 MG/DL (ref 0–199)
CO2: 27 MMOL/L
CO2: 34 MMOL/L (ref 21–32)
CREAT SERPL-MCNC: 1.7 MG/DL
CREAT SERPL-MCNC: 1.7 MG/DL (ref 0.8–1.3)
EOSINOPHILS ABSOLUTE: 0.1 K/UL (ref 0–0.6)
EOSINOPHILS RELATIVE PERCENT: 1.9 %
ESTIMATED AVERAGE GLUCOSE: 208.7 MG/DL
GFR AFRICAN AMERICAN: 49
GFR CALCULATED: 40
GFR NON-AFRICAN AMERICAN: 40
GLUCOSE BLD-MCNC: 198 MG/DL (ref 70–99)
GLUCOSE BLD-MCNC: 203 MG/DL
GLUCOSE BLD-MCNC: 207 MG/DL (ref 70–99)
GLUCOSE BLD-MCNC: 218 MG/DL (ref 70–99)
GLUCOSE BLD-MCNC: 233 MG/DL (ref 70–99)
GLUCOSE BLD-MCNC: 236 MG/DL (ref 70–99)
HBA1C MFR BLD: 8.9 %
HCT VFR BLD CALC: 33.1 % (ref 40.5–52.5)
HDLC SERPL-MCNC: 29 MG/DL (ref 40–60)
HEMOGLOBIN: 10.7 G/DL (ref 13.5–17.5)
LDL CHOLESTEROL CALCULATED: 45 MG/DL
LYMPHOCYTES ABSOLUTE: 0.5 K/UL (ref 1–5.1)
LYMPHOCYTES RELATIVE PERCENT: 8.5 %
MAGNESIUM: 1.7 MG/DL (ref 1.8–2.4)
MCH RBC QN AUTO: 26.6 PG (ref 26–34)
MCHC RBC AUTO-ENTMCNC: 32.2 G/DL (ref 31–36)
MCV RBC AUTO: 82.4 FL (ref 80–100)
MONOCYTES ABSOLUTE: 0.4 K/UL (ref 0–1.3)
MONOCYTES RELATIVE PERCENT: 6.5 %
NEUTROPHILS ABSOLUTE: 5 K/UL (ref 1.7–7.7)
NEUTROPHILS RELATIVE PERCENT: 82.6 %
PDW BLD-RTO: 19.2 % (ref 12.4–15.4)
PERFORMED ON: ABNORMAL
PLATELET # BLD: 160 K/UL (ref 135–450)
PMV BLD AUTO: 9 FL (ref 5–10.5)
POTASSIUM SERPL-SCNC: 3 MMOL/L (ref 3.5–5.1)
POTASSIUM SERPL-SCNC: 3.2 MMOL/L
POTASSIUM SERPL-SCNC: 3.3 MMOL/L (ref 3.5–5.1)
RBC # BLD: 4.02 M/UL (ref 4.2–5.9)
SODIUM BLD-SCNC: 135 MMOL/L
SODIUM BLD-SCNC: 138 MMOL/L (ref 136–145)
TOTAL PROTEIN: 8 G/DL (ref 6.4–8.2)
TRIGL SERPL-MCNC: 105 MG/DL (ref 0–150)
VLDLC SERPL CALC-MCNC: 21 MG/DL
WBC # BLD: 6 K/UL (ref 4–11)

## 2022-08-25 PROCEDURE — 36415 COLL VENOUS BLD VENIPUNCTURE: CPT

## 2022-08-25 PROCEDURE — 2060000000 HC ICU INTERMEDIATE R&B

## 2022-08-25 PROCEDURE — 6360000002 HC RX W HCPCS

## 2022-08-25 PROCEDURE — 2700000000 HC OXYGEN THERAPY PER DAY

## 2022-08-25 PROCEDURE — 97116 GAIT TRAINING THERAPY: CPT

## 2022-08-25 PROCEDURE — 1200000000 HC SEMI PRIVATE

## 2022-08-25 PROCEDURE — 83036 HEMOGLOBIN GLYCOSYLATED A1C: CPT

## 2022-08-25 PROCEDURE — 94761 N-INVAS EAR/PLS OXIMETRY MLT: CPT

## 2022-08-25 PROCEDURE — 84132 ASSAY OF SERUM POTASSIUM: CPT

## 2022-08-25 PROCEDURE — 99223 1ST HOSP IP/OBS HIGH 75: CPT | Performed by: INTERNAL MEDICINE

## 2022-08-25 PROCEDURE — 85025 COMPLETE CBC W/AUTO DIFF WBC: CPT

## 2022-08-25 PROCEDURE — 80048 BASIC METABOLIC PNL TOTAL CA: CPT

## 2022-08-25 PROCEDURE — 6360000002 HC RX W HCPCS: Performed by: INTERNAL MEDICINE

## 2022-08-25 PROCEDURE — 6370000000 HC RX 637 (ALT 250 FOR IP)

## 2022-08-25 PROCEDURE — 97162 PT EVAL MOD COMPLEX 30 MIN: CPT

## 2022-08-25 PROCEDURE — 97530 THERAPEUTIC ACTIVITIES: CPT

## 2022-08-25 PROCEDURE — 6370000000 HC RX 637 (ALT 250 FOR IP): Performed by: INTERNAL MEDICINE

## 2022-08-25 PROCEDURE — 97535 SELF CARE MNGMENT TRAINING: CPT

## 2022-08-25 PROCEDURE — 80061 LIPID PANEL: CPT

## 2022-08-25 PROCEDURE — 83735 ASSAY OF MAGNESIUM: CPT

## 2022-08-25 PROCEDURE — 97166 OT EVAL MOD COMPLEX 45 MIN: CPT

## 2022-08-25 PROCEDURE — 80076 HEPATIC FUNCTION PANEL: CPT

## 2022-08-25 PROCEDURE — 2580000003 HC RX 258: Performed by: INTERNAL MEDICINE

## 2022-08-25 RX ORDER — CYCLOBENZAPRINE HCL 10 MG
5 TABLET ORAL 3 TIMES DAILY PRN
Status: DISCONTINUED | OUTPATIENT
Start: 2022-08-25 | End: 2022-09-01 | Stop reason: HOSPADM

## 2022-08-25 RX ORDER — INSULIN GLARGINE 100 [IU]/ML
50 INJECTION, SOLUTION SUBCUTANEOUS NIGHTLY
Status: DISCONTINUED | OUTPATIENT
Start: 2022-08-25 | End: 2022-09-01 | Stop reason: HOSPADM

## 2022-08-25 RX ORDER — INSULIN LISPRO 100 [IU]/ML
0-16 INJECTION, SOLUTION INTRAVENOUS; SUBCUTANEOUS
Status: DISCONTINUED | OUTPATIENT
Start: 2022-08-25 | End: 2022-09-01 | Stop reason: HOSPADM

## 2022-08-25 RX ORDER — POTASSIUM CHLORIDE 20 MEQ/1
40 TABLET, EXTENDED RELEASE ORAL ONCE
Status: COMPLETED | OUTPATIENT
Start: 2022-08-25 | End: 2022-08-25

## 2022-08-25 RX ORDER — LANOLIN ALCOHOL/MO/W.PET/CERES
400 CREAM (GRAM) TOPICAL 2 TIMES DAILY
Status: DISCONTINUED | OUTPATIENT
Start: 2022-08-25 | End: 2022-09-01 | Stop reason: HOSPADM

## 2022-08-25 RX ORDER — INSULIN LISPRO 100 [IU]/ML
5 INJECTION, SOLUTION INTRAVENOUS; SUBCUTANEOUS
Status: DISCONTINUED | OUTPATIENT
Start: 2022-08-25 | End: 2022-09-01 | Stop reason: HOSPADM

## 2022-08-25 RX ORDER — POTASSIUM CHLORIDE 20 MEQ/1
20 TABLET, EXTENDED RELEASE ORAL DAILY
Status: DISCONTINUED | OUTPATIENT
Start: 2022-08-26 | End: 2022-09-01 | Stop reason: HOSPADM

## 2022-08-25 RX ORDER — INSULIN LISPRO 100 [IU]/ML
0-4 INJECTION, SOLUTION INTRAVENOUS; SUBCUTANEOUS NIGHTLY
Status: DISCONTINUED | OUTPATIENT
Start: 2022-08-25 | End: 2022-09-01 | Stop reason: HOSPADM

## 2022-08-25 RX ORDER — INSULIN GLARGINE 100 [IU]/ML
40 INJECTION, SOLUTION SUBCUTANEOUS NIGHTLY
Status: DISCONTINUED | OUTPATIENT
Start: 2022-08-25 | End: 2022-08-25

## 2022-08-25 RX ADMIN — MIDODRINE HYDROCHLORIDE 5 MG: 5 TABLET ORAL at 17:06

## 2022-08-25 RX ADMIN — ATORVASTATIN CALCIUM 80 MG: 80 TABLET, FILM COATED ORAL at 20:46

## 2022-08-25 RX ADMIN — FLUTICASONE PROPIONATE 1 SPRAY: 50 SPRAY, METERED NASAL at 08:51

## 2022-08-25 RX ADMIN — ENOXAPARIN SODIUM 30 MG: 100 INJECTION SUBCUTANEOUS at 08:51

## 2022-08-25 RX ADMIN — OXYCODONE AND ACETAMINOPHEN 1 TABLET: 325; 5 TABLET ORAL at 22:57

## 2022-08-25 RX ADMIN — ENOXAPARIN SODIUM 30 MG: 100 INJECTION SUBCUTANEOUS at 20:46

## 2022-08-25 RX ADMIN — INSULIN GLARGINE 50 UNITS: 100 INJECTION, SOLUTION SUBCUTANEOUS at 20:46

## 2022-08-25 RX ADMIN — PANTOPRAZOLE SODIUM 40 MG: 40 TABLET, DELAYED RELEASE ORAL at 06:18

## 2022-08-25 RX ADMIN — INSULIN LISPRO 2 UNITS: 100 INJECTION, SOLUTION INTRAVENOUS; SUBCUTANEOUS at 12:43

## 2022-08-25 RX ADMIN — OXYCODONE AND ACETAMINOPHEN 1 TABLET: 325; 5 TABLET ORAL at 08:51

## 2022-08-25 RX ADMIN — FERROUS SULFATE TAB 325 MG (65 MG ELEMENTAL FE) 325 MG: 325 (65 FE) TAB at 08:51

## 2022-08-25 RX ADMIN — SENNOSIDES 8.6 MG: 8.6 TABLET, COATED ORAL at 20:46

## 2022-08-25 RX ADMIN — CYCLOBENZAPRINE 5 MG: 10 TABLET, FILM COATED ORAL at 17:06

## 2022-08-25 RX ADMIN — FUROSEMIDE 10 MG/HR: 10 INJECTION, SOLUTION INTRAMUSCULAR; INTRAVENOUS at 20:44

## 2022-08-25 RX ADMIN — ALLOPURINOL 300 MG: 300 TABLET ORAL at 08:51

## 2022-08-25 RX ADMIN — FERROUS SULFATE TAB 325 MG (65 MG ELEMENTAL FE) 325 MG: 325 (65 FE) TAB at 20:46

## 2022-08-25 RX ADMIN — CARVEDILOL 3.12 MG: 3.12 TABLET, FILM COATED ORAL at 08:51

## 2022-08-25 RX ADMIN — MIDODRINE HYDROCHLORIDE 5 MG: 5 TABLET ORAL at 08:51

## 2022-08-25 RX ADMIN — TAMSULOSIN HYDROCHLORIDE 0.4 MG: 0.4 CAPSULE ORAL at 06:18

## 2022-08-25 RX ADMIN — INSULIN LISPRO 2 UNITS: 100 INJECTION, SOLUTION INTRAVENOUS; SUBCUTANEOUS at 08:56

## 2022-08-25 RX ADMIN — MAGNESIUM HYDROXIDE 30 ML: 1200 LIQUID ORAL at 10:03

## 2022-08-25 RX ADMIN — PREGABALIN 25 MG: 25 CAPSULE ORAL at 20:46

## 2022-08-25 RX ADMIN — INSULIN LISPRO 5 UNITS: 100 INJECTION, SOLUTION INTRAVENOUS; SUBCUTANEOUS at 17:09

## 2022-08-25 RX ADMIN — OXYCODONE AND ACETAMINOPHEN 1 TABLET: 325; 5 TABLET ORAL at 02:29

## 2022-08-25 RX ADMIN — MIDODRINE HYDROCHLORIDE 5 MG: 5 TABLET ORAL at 12:43

## 2022-08-25 RX ADMIN — Medication 400 MG: at 20:46

## 2022-08-25 RX ADMIN — PREGABALIN 25 MG: 25 CAPSULE ORAL at 10:00

## 2022-08-25 RX ADMIN — CARVEDILOL 3.12 MG: 3.12 TABLET, FILM COATED ORAL at 17:06

## 2022-08-25 RX ADMIN — POTASSIUM CHLORIDE 40 MEQ: 1500 TABLET, EXTENDED RELEASE ORAL at 10:00

## 2022-08-25 RX ADMIN — OXYCODONE AND ACETAMINOPHEN 1 TABLET: 325; 5 TABLET ORAL at 15:50

## 2022-08-25 RX ADMIN — Medication 400 MG: at 10:00

## 2022-08-25 RX ADMIN — FUROSEMIDE 10 MG/HR: 10 INJECTION, SOLUTION INTRAMUSCULAR; INTRAVENOUS at 10:23

## 2022-08-25 RX ADMIN — INSULIN LISPRO 4 UNITS: 100 INJECTION, SOLUTION INTRAVENOUS; SUBCUTANEOUS at 17:07

## 2022-08-25 RX ADMIN — POLYETHYLENE GLYCOL 3350 17 G: 17 POWDER, FOR SOLUTION ORAL at 08:51

## 2022-08-25 ASSESSMENT — PAIN DESCRIPTION - DESCRIPTORS
DESCRIPTORS: ACHING;DISCOMFORT
DESCRIPTORS: ACHING;DISCOMFORT
DESCRIPTORS: CRAMPING;DISCOMFORT;ACHING
DESCRIPTORS: ACHING;DISCOMFORT

## 2022-08-25 ASSESSMENT — PAIN DESCRIPTION - LOCATION
LOCATION: NECK
LOCATION: BACK
LOCATION: GENERALIZED

## 2022-08-25 ASSESSMENT — PAIN SCALES - GENERAL
PAINLEVEL_OUTOF10: 8
PAINLEVEL_OUTOF10: 6
PAINLEVEL_OUTOF10: 6
PAINLEVEL_OUTOF10: 8
PAINLEVEL_OUTOF10: 0
PAINLEVEL_OUTOF10: 0
PAINLEVEL_OUTOF10: 9

## 2022-08-25 ASSESSMENT — PAIN DESCRIPTION - PAIN TYPE
TYPE: CHRONIC PAIN
TYPE: CHRONIC PAIN

## 2022-08-25 ASSESSMENT — PAIN - FUNCTIONAL ASSESSMENT
PAIN_FUNCTIONAL_ASSESSMENT: ACTIVITIES ARE NOT PREVENTED
PAIN_FUNCTIONAL_ASSESSMENT: ACTIVITIES ARE NOT PREVENTED

## 2022-08-25 ASSESSMENT — PAIN DESCRIPTION - ONSET: ONSET: ON-GOING

## 2022-08-25 ASSESSMENT — PAIN DESCRIPTION - FREQUENCY: FREQUENCY: CONTINUOUS

## 2022-08-25 NOTE — PLAN OF CARE
Problem: Pain  Goal: Verbalizes/displays adequate comfort level or baseline comfort level  Outcome: Progressing  Note: Pt will be satisfied with pain control. Pt uses numeric pain rating scale with reassessments after pain med administration. Will continue to monitor progression throughout shift. Problem: Safety - Adult  Goal: Free from fall injury  Outcome: Progressing  Note: Pt high fall risk. Instructed to use call light before getting out of bed. Call light within reach. Bed in low position. Bed alarm on. Will continue to monitor. Problem: Chronic Conditions and Co-morbidities  Goal: Patient's chronic conditions and co-morbidity symptoms are monitored and maintained or improved  Outcome: Progressing  Note: Will continue to monitor pts blood sugar and dietary intake. Patient's EF (Ejection Fraction) is greater than 40%    Heart Failure Medications:  Diuretics[de-identified] Furosemide    (One of the following REQUIRED for EF </= 40%/SYSTOLIC FAILURE but MAY be used in EF% >40%/DIASTOLIC FAILURE)        ACE[de-identified] None        ARB[de-identified] None         ARNI[de-identified] None    (Beta Blockers)  NON- Evidenced Based Beta Blocker (for EF% >40%/DIASTOLIC FAILURE): None    Evidenced Based Beta Blocker::(REQUIRED for EF% <40%/SYSTOLIC FAILURE) None  . .................................................................................................................................................. Patient's weights and intake/output reviewed: Yes    Patient's Last Weight: 302 lb lbs obtained by standing scale. Difference of 3 lbs less than last documented weight.       Intake/Output Summary (Last 24 hours) at 8/25/2022 1309  Last data filed at 8/25/2022 1200  Gross per 24 hour   Intake 920 ml   Output 3425 ml   Net -2505 ml       Comorbidities Reviewed Yes    Patient has a past medical history of Anemia, Angina, Arthritis, CAD (coronary artery disease), CHF (congestive heart failure) ([facility] CKD (chronic kidney disease) stage 3, GFR 30-59 ml/min ([address] Clostridium difficile diarrhea, Diabetes mellitus ([address] Diabetic neuropathy ([address] Disease of blood and blood forming organ, Dizziness, GERD (gastroesophageal reflux disease), Headache, Hearing loss, Hyperlipidemia, Hypertension, Kidney stone, Refusal of blood transfusions as patient is Lutheran, Sleep apnea, Tinnitus, Venous ulcer ([address] and Wound, open. >>For CHF and Comorbidity documentation on Education Time and Topics, please see Education Tab    Progressive Mobility Assessment:  What is this patient's Current Level of Mobility?: Ambulatory- with Assistance  How was this patient Mobilized today?: Edge of Bed, Up to Chair, and  Up to Toilet/Shower, ambulated 15 ft                 With Whom? Nurse and PCA                 Level of Difficulty/Assistance: 1x Assist     Pt up in chair at this time on  4 L O2. Pt with complaints of shortness of breath. Pt with pitting lower extremity edema.      Patient and/or Family's stated Goal of Care this Admission: reduce shortness of breath, increase activity tolerance, better understand heart failure and disease management, be more comfortable, and reduce lower extremity edema prior to discharge        :

## 2022-08-25 NOTE — CONSULTS
[address] [facility]   Cardiovascular Evaluation    PATIENT: Neo Girard  DATE: 2022  MRN: [id number]  CSN: [id number]  : 1955    Primary Care Doctor/Referring provider: No primary care provider on file., Shiva Kaufman MD     Reason for evaluation/Chief complaint:   SOB      Subjective:    History of present illness on initial date of evaluation:   Neo Girard is a 75 y.o. patient who presents for the evaluation of increasing shortness of breath and weight gain. The patient is well-known to our cardiovascular practice and has an extensive history of diastolic heart failure and associated cardio renal syndrome. Patient was seen in our outpatient office yesterday by our advanced heart failure team.  Due to the patient's symptomatology, he was directly admitted to the hospital and started on IV Lasix therapy. Furthermore, nephrology consultation has been obtained. Patient was most recently admitted to the hospital earlier this month and spent several days undergoing diuresis and management of his heart failure. He was discharged on a new regimen of oral diuretics. Unfortunately, patient states that over the past 3 to 4 days he noted increasing edema in his lower extremities and progressive shortness of breath. Patient states that he gets short of breath with minimal activity. Shortness of breath is associated with cough and increasing work of breathing. Due to these symptoms, the patient presented to the emergency room. He was seen and evaluated and diagnosed with acute decompensation of his chronic diastolic heart failure. He was admitted to the hospital and started on IV Lasix. Cardiology is asked to see the patient for further recommendations and management. Patient states that he was compliant with his diuretic regimen. He states that he is increasingly frustrated with the lack of his improvement.         Patient Active Problem List   Diagnosis    DM (diabetes mellitus) ([facility]    Coronary artery disease involving native coronary artery of native heart without angina pectoris    Anemia of chronic disease    Essential hypertension    Hyperkalemia    CKD (chronic kidney disease) stage 3, GFR 30-59 ml/min ([facility])    Chronic diastolic heart failure ([facility]    Pulmonary hypertension due to left ventricular diastolic dysfunction ([facility])    Morbid obesity ([facility])    S/P CABG x 3    DM2 (diabetes mellitus, type 2) ([facility]    Morbid obesity with BMI of 50.0-59.9, adult ([facility])    HLD (hyperlipidemia)    Cardiomyopathy ([facility])    Productive cough    Chronic pain    Severe obstructive sleep apnea    Obesity, Class III, BMI 40-49.9 (morbid obesity) ([facility])    Acute diastolic congestive heart failure ([facility])    Degeneration of lumbar or lumbosacral intervertebral disc    Displacement of lumbar intervertebral disc without myelopathy    Lumbosacral spondylosis without myelopathy    Lumbar facet arthropathy    Disc displacement, lumbar    Spinal stenosis of lumbar region    Mixed conductive and sensorineural hearing loss of left ear with restricted hearing of right ear    Tympanic membrane perforation, left    Chest pain    Cor pulmonale, chronic ([facility])    Pulmonary hypertension due to alveolar hypoventilation disorder ([facility])    Nonrheumatic aortic valve stenosis    Chronic neck pain    Dyspnea    Acute diastolic CHF (congestive heart failure) ([facility])    Cervical stenosis of spinal canal    Degeneration of cervical intervertebral disc    Cervical radiculitis    Acute on chronic diastolic CHF (congestive heart failure) ([facility])    Acute respiratory failure with hypoxia ([facility])    CKD (chronic kidney disease)    PAF (paroxysmal atrial fibrillation) ([facility])    Acute diastolic HF (heart failure) ([facility])    Subdural hematoma ([facility])    SDH (subdural hematoma) ([facility])    Chronic respiratory failure with hypoxia ([facility])    Fluid overload         Cardiac Testing: I have reviewed the findings below.   EKG:  ECHO:   STRESS TEST:  CATH:  BYPASS:  VASCULAR:    Past Medical History:   has a past medical history of Anemia, Angina, Arthritis, CAD (coronary artery disease), CHF (congestive heart failure) ([facility] CKD (chronic kidney disease) stage 3, GFR 30-59 ml/min ([facility]), Clostridium difficile diarrhea, Diabetes mellitus ([facility] Diabetic neuropathy ([facility] Disease of blood and blood forming organ, Dizziness, GERD (gastroesophageal reflux disease), Headache, Hearing loss, Hyperlipidemia, Hypertension, Kidney stone, Refusal of blood transfusions as patient is Hindu, Sleep apnea, Tinnitus, Venous ulcer ([facility] and Wound, open. Surgical History:   has a past surgical history that includes hernia repair (age2); Cholecystectomy (9/2011); other surgical history (11-16-11 REPAIR LOWER STERNAL INCISION, REMOVAL OF ONE STERNAL WIRE,); Upper gastrointestinal endoscopy; Cardiac surgery; other surgical history (10/10/2014); Pain management procedure (N/A, 2/10/2022); and Pain management procedure (N/A, 7/21/2022). Social History:   reports that he quit smoking about 34 years ago. His smoking use included cigarettes. He has a 16.00 pack-year smoking history. He has never used smokeless tobacco. He reports that he does not drink alcohol and does not use drugs. Family History:  No evidence for sudden cardiac death or premature CAD    Medications:  Reviewed and are listed in nursing record. and/or listed below  Outpatient Medications:  Prior to Admission medications    Medication Sig Start Date End Date Taking?  Authorizing Provider   potassium chloride (KLOR-CON M) 20 MEQ extended release tablet  7/29/22   Historical Provider, MD   spironolactone (ALDACTONE) 25 MG tablet  7/20/22   Historical Provider, MD   meclizine (ANTIVERT) 25 MG tablet  7/27/22   Historical Provider, MD   hydrALAZINE (APRESOLINE) 10 MG tablet  7/20/22   Historical Provider, MD   NOVOLOG FLEXPEN 100 UNIT/ML injection pen  8/22/22   Historical Provider, MD   torsemide [facility]) 100 MG tablet Take 1 tablet by mouth in the morning and at bedtime 8/23/22   PATRICK Mujica CNP   metOLazone (ZAROXOLYN) 5 MG tablet 1 tab twice a week (Monday and fridays) 7/14/22   PATRICK Mujica CNP   Sodium Phosphates (FLEET) 7-19 GM/118ML Place 1 enema rectally daily as needed    Historical Provider, MD   glucagon 1 MG injection Inject 1 kit into the muscle once Emergencies only    Historical Provider, MD   Glycerin, Laxative, (GLYCERIN, ADULT,) 2 g SUPP suppository Place 1 suppository rectally as needed  Patient not taking: No sig reported    Historical Provider, MD   loperamide (IMODIUM) 2 MG capsule Take 2 mg by mouth as needed for Diarrhea    Historical Provider, MD   magnesium hydroxide (MILK OF MAGNESIA) 400 MG/5ML suspension Take by mouth as needed for Constipation    Historical Provider, MD   oxyCODONE-acetaminophen (PERCOCET) 5-325 MG per tablet Take 1 tablet by mouth every 6 hours as needed for Pain. Historical Provider, MD   pregabalin (LYRICA) 25 MG capsule Take 1 capsule by mouth 2 times daily for 3 days. Patient taking differently: Take 25 mg by mouth 2 times daily.  Patient is still taking this medication through nursing home pharmacy as of 6/6/22 5/29/22 8/24/22  Amarjit Pennington MD   ondansetron (ZOFRAN-ODT) 4 MG disintegrating tablet  5/16/22   Historical Provider, MD   RHOPRESSA 0.02 % SOLN  5/19/22   Historical Provider, MD   dorzolamide (TRUSOPT) 2 % ophthalmic solution  5/21/22   Historical Provider, MD   brimonidine-timolol (COMBIGAN) 0.2-0.5 % ophthalmic solution  5/20/22   Historical Provider, MD   isosorbide mononitrate (IMDUR) 30 MG extended release tablet Take 1 tablet by mouth daily 5/26/22   PATRICK Mujica CNP   insulin glargine (LANTUS SOLOSTAR) 100 UNIT/ML injection pen Inject 35 Units into the skin daily  Patient taking differently: Inject 35 Units into the skin daily Takes 15 units at bedtime and 40 units in the AM 4/9/22   Peggyann Fothergill Cb Coronado MD   carvedilol (COREG) 3.125 MG tablet Take 1 tablet by mouth 2 times daily (with meals) 4/9/22   Liyah Phillips MD   insulin lispro (HUMALOG) 100 UNIT/ML injection vial Inject 10 Units into the skin 3 times daily (with meals) 4/9/22   Liyah Phillips MD   linaclotide [place] [facility]) 290 MCG CAPS capsule Take 1 capsule by mouth every morning (before breakfast) 4/10/22   Liyah Phillips MD   tamsulosin [facility]) 0.4 MG capsule Take 0.4 mg by mouth Daily 12/30/21   Historical Provider, MD   atorvastatin (LIPITOR) 80 MG tablet Take 1 tablet by mouth nightly 3/14/22   PATRICK Thomas CNP   allopurinol (ZYLOPRIM) 300 MG tablet Take 1 tablet by mouth daily 3/15/22   Hayden Mendes MD   naloxone 4 MG/0.1ML LIQD nasal spray 1 spray by Nasal route as needed for Opioid Reversal 11/23/21   Alayna Anguiano MD   acetaminophen (TYLENOL) 500 MG tablet Take 2 tablets by mouth 4 times daily as needed for Pain 9/17/20   PATRICK Killian CNP   senna (SENOKOT) 8.6 MG TABS tablet Take 1 tablet by mouth 2 times daily 7/8/19   PATRICK Medeiros - CNP   docusate sodium (COLACE, DULCOLAX) 100 MG CAPS Take 100 mg by mouth 2 times daily 5/1/18   PATRICK Atkins CNP   Compression Stockings MISC by Does not apply route 2 pair, knee high compression stockings, fitted/ zippered 6/15/16   Merle Lips, APRN - CNP   silver sulfADIAZINE (SILVADENE) 1 % cream Apply to BL lower leg ulcers daily 2/9/16   Carlyn Waters MD   nitroGLYCERIN (NITROSTAT) 0.4 MG SL tablet Place 1 tablet under the tongue every 5 minutes as needed  Patient not taking: No sig reported 8/17/15   Merle Lips, APRN - CNP   fluticasone (FLONASE) 50 MCG/ACT nasal spray 1 spray by Nasal route nightly as needed. Historical Provider, MD   ferrous sulfate 325 (65 FE) MG tablet Take 325 mg by mouth 2 times daily  12/22/10   Historical Provider, MD   omeprazole (PRILOSEC) 20 MG delayed release capsule Take 20 mg by mouth 2 times daily. 12/22/10   Historical Provider, MD       In-patient schedule medications:   magnesium oxide  400 mg Oral BID    insulin glargine  40 Units SubCUTAneous Nightly    [START ON 8/26/2022] potassium chloride  20 mEq Oral Daily    sodium chloride flush  5-40 mL IntraVENous 2 times per day    enoxaparin  30 mg SubCUTAneous BID    allopurinol  300 mg Oral Daily    atorvastatin  80 mg Oral Nightly    carvedilol  3.125 mg Oral BID WC    polyethylene glycol  17 g Oral Daily    ferrous sulfate  325 mg Oral BID    pantoprazole  40 mg Oral QAM AC    pregabalin  25 mg Oral BID    tamsulosin  0.4 mg Oral Daily    insulin lispro  0-8 Units SubCUTAneous TID WC    insulin lispro  0-4 Units SubCUTAneous Nightly    senna  1 tablet Oral Nightly    fluticasone  1 spray Each Nostril Daily    midodrine  5 mg Oral TID WC         Infusion Medications:   sodium chloride      furosemide (LASIX) 1mg/ml infusion 10 mg/hr (08/25/22 1023)    dextrose           Allergies:  Amitriptyline, Celecoxib, Cymbalta [duloxetine hcl], Elavil [amitriptyline hcl], Gabapentin, and Nsaids     Review of Systems:   All 14 point review of symptoms completed. Pertinent positives identified in the HPI, all other review of symptoms findings as below.        Physical Examination:    [unfilled]  BP (!) 115/54   Pulse 74   Temp 97.7 °F (36.5 °C) (Oral)   Resp 18   Ht 5' 9\" (1.753 m)   Wt 299 lb 3.2 oz (135.7 kg)   SpO2 99%   BMI 44.18 kg/m²    Weight: 299 lb 3.2 oz (135.7 kg)     Wt Readings from Last 3 Encounters:   08/25/22 299 lb 3.2 oz (135.7 kg)   08/24/22 (!) 302 lb (137 kg)   08/18/22 (!) 305 lb (138.3 kg)       Intake/Output Summary (Last 24 hours) at 8/25/2022 1312  Last data filed at 8/25/2022 1200  Gross per 24 hour   Intake 920 ml   Output 3425 ml   Net -2505 ml     Physical Examination:    [unfilled]  BP (!) 115/54   Pulse 74   Temp 97.7 °F (36.5 °C) (Oral)   Resp 18   Ht 5' 9\" (1.753 m)   Wt 299 lb 3.2 oz (135.7 kg)   SpO2 99%   BMI 44.18 kg/m² Weight: 299 lb 3.2 oz (135.7 kg)     Wt Readings from Last 3 Encounters:   08/25/22 299 lb 3.2 oz (135.7 kg)   08/24/22 (!) 302 lb (137 kg)   08/18/22 (!) 305 lb (138.3 kg)       Intake/Output Summary (Last 24 hours) at 8/25/2022 1312  Last data filed at 8/25/2022 1200  Gross per 24 hour   Intake 920 ml   Output 3425 ml   Net -2505 ml       General Appearance:  Alert, cooperative, no distress, appears stated age   Head:  Normocephalic, without obvious abnormality, atraumatic   Eyes:  PERRL, conjunctiva/corneas clear       Nose: Nares normal, no drainage or sinus tenderness   Throat: Lips, mucosa, and tongue normal   Neck: Supple, symmetrical, trachea midline, no adenopathy, thyroid: not enlarged, symmetric, no tenderness/mass/nodules, no carotid bruit. + JVD       Lungs:   Reduced to auscultation with rales bilaterally, respirations mildly labored   Chest Wall:  No tenderness or deformity   Heart:  Regular rhythm and normal rate; S1, S2 are normal; no murmur noted; no rub or gallop   Abdomen:   Soft, non-tender, bowel sounds active all four quadrants,  no masses, no organomegaly           Extremities: Extremities atraumatic, no cyanosis. + 2 edema   Pulses: 2+ and symmetric   Skin: Skin color, texture, turgor normal, no rashes or lesions   Pysch: Fatigued mood and affect   Neurologic: Normal gross motor and sensory exam.             Labs  Recent Labs     08/24/22  1216 08/25/22  0638   WBC 6.2 6.0   HGB 10.7* 10.7*   HCT 33.5* 33.1*   MCV 82.8 82.4    160     Recent Labs     08/24/22  1216 08/25/22  0638   CREATININE 1.9* 1.7*   * 111*   * 138   K 3.4* 3.0*   CL 91* 92*   CO2 32 34*     No results for input(s): INR, PROTIME in the last 72 hours. No results for input(s): TROPONINI in the last 72 hours. Invalid input(s): PRO-BNP  Recent Labs     08/25/22  0638   CHOL 95   HDL 29*         Imaging:  I have reviewed the below testing personally and my interpretation is below.   EKG: CXR:        Assessment:  75 y.o. patient with:   Acute diastolic HF (heart failure) (HCC)  CKD  Essential hypertension   Obesity    Plan:  1. Continue lasix infusion    ~limitations by renal function  2. Nephrology consultation to assist with diuretics   ~should we consider ultrafiltration? 3. CHF education reinforced. ~salt restriction   ~fluid restriction   ~medication compliance   ~daily weights and notify of any significant weight gain/loss   ~establish with CHF nurse  4. Advanced heart failure team to consult for additional management options. 5.  Elevated troponin levels are due to poor renal clearance and not reflective of myocardial ischemia. Medical Decision Making: The following items were considered in medical decision making:  Independent review of images  Review / order clinical lab tests  Review / order radiology tests  Decision to obtain old records  Review and summation of old records as accessed through [facility] (a summary of my findings in these old records)      All questions and concerns were addressed to the patient/family. Alternatives to my treatment were discussed. The note was completed using EMR. Every effort was made to ensure accuracy; however, inadvertent computerized transcription errors may be present.     Shi Bobby MD, [address], [place], [place]  [phone] [facility]  [phone] [facility]  8/25/2022  1:12 PM

## 2022-08-25 NOTE — CARE COORDINATION
CASE MANAGEMENT INITIAL ASSESSMENT    Reviewed chart and completed assessment with patient at bedside  Family present:  no  Explained Case Management role/services. yes    Primary contact information: see below    Brandon :   Primary Decision Maker: Geeta De Jesus Other - [phone]    Secondary Decision Maker: Kirstenganga Shon - Other - [phone]        Admit date/status: IPA 8/24/22  Diagnosis:fluid overload     Is this a Readmission?:  No      Insurance: [address] [place] [address] required for SNF: Yes       3 night stay required: No    Living arrangements, Adls, care needs, prior to admission:Long term care at [address] [place] [address] Rd at home:  Walker__Cane__RTS__ BSC__Shower Chair__  02__ HHN__ CPAP__  BiPap__  Hospital Bed__ W/C___ Other_____    Services in the home and/or outpatient, prior to admission: per ECF    Current PCP: per ECF               Medications: Prescription coverage? Yes Will pt require financial assistance with medications No     Transportation needs: ambulance back to ECF. No preference for agency     PT/OT recs: not completed at this time    [address] Notification (HEN): na - pt is long term care    Barriers to discharge: none    Plan/comments: pt intends to dc back to ECF. Evelina Aase at facility states no barriers to return. Rapid covid test needed w/in 48 hours of discharge. Disposition time frame unclear, as pt is on lasix gtt at 10 and currently requiring 5 liters of oxygen.       [facility] on chart for MD maximilian Stuart, RN

## 2022-08-25 NOTE — PROGRESS NOTES
Occupational Therapy  Facility/Department: [facility] A2 CARD TELEMETRY  Occupational Therapy Initial Assessment and Treatment    Name: Arti Pandey  : 1955  MRN: [id number]  Date of Service: 2022    Discharge Recommendations:  [address] with OT  OT Equipment Recommendations  Equipment Needed: No       Patient Diagnosis(es): There were no encounter diagnoses. Past Medical History:  has a past medical history of Anemia, Angina, Arthritis, CAD (coronary artery disease), CHF (congestive heart failure) ([address] CKD (chronic kidney disease) stage 3, GFR 30-59 ml/min ([facility]), Clostridium difficile diarrhea, Diabetes mellitus ([address] Diabetic neuropathy ([facility] [address] Disease of blood and blood forming organ, Dizziness, GERD (gastroesophageal reflux disease), Headache, Hearing loss, Hyperlipidemia, Hypertension, Kidney stone, Refusal of blood transfusions as patient is Quaker, Sleep apnea, Tinnitus, Venous ulcer ([facility] [address] and Wound, open. Past Surgical History:  has a past surgical history that includes hernia repair (age1); Cholecystectomy (2011); other surgical history (-16- REPAIR LOWER STERNAL INCISION, REMOVAL OF ONE STERNAL WIRE,); Upper gastrointestinal endoscopy; Cardiac surgery; other surgical history (10/10/2014); Pain management procedure (N/A, 2/10/2022); and Pain management procedure (N/A, 2022). Treatment Diagnosis: decreased participation in ADLs and functional mobility      Assessment   Performance deficits / Impairments: Decreased functional mobility ; Decreased endurance;Decreased coordination;Decreased ADL status; Decreased safe awareness;Decreased vision/visual deficit  Assessment: Arti Pandey is a(n) 75 y.o. male admitted to [facility] with c/o fluid overload from [address] [place] [address].   PLOF: requires A at baseline from staff for ADLs/IADLs with use of rollator and home O2 at 3L  Due to decreased functional balance, pt required:   Functional Mobility:  Min A for sit <> stand, CGA with RW to/from bathroom, CGA for toilet transfer (pt remained in stance to urinate)  ADL Training: CGA for dick care after urination, pt used wipe on anterior dick area, dep for LB dressing: sock adjustment (pt managed LB clothing with SBA during urination), set up for grooming (sani wipe while seated)  Pt required frequent verbal cues for safety, including importance of measuring output. Pt is functioning below baseline and would benefit from cont OT while in acute care. Once medically appropriate, rec  LTC with OT  upon d/c.      Treatment Diagnosis: decreased participation in ADLs and functional mobility  Prognosis: Fair  Decision Making: Medium Complexity  REQUIRES OT FOLLOW-UP: Yes  Activity Tolerance  Activity Tolerance: Patient Tolerated treatment well;Patient limited by fatigue        Plan   Plan  Times per Week: 3-4  Current Treatment Recommendations: Balance training, Functional mobility training, Endurance training, Equipment evaluation, education, & procurement, Patient/Caregiver education & training, Safety education & training, Self-Care / ADL     Restrictions  Restrictions/Precautions  Restrictions/Precautions: General Precautions  Position Activity Restriction  Other position/activity restrictions: up with assist, tele, 1500 ml fluid restriction, maintain heels off bed    Subjective   General  Chart Reviewed: Yes  Patient assessed for rehabilitation services?: Yes  Family / Caregiver Present: No  Referring Practitioner: PATRICK Felton CNP  Diagnosis: Fluid overload  Subjective  Subjective: pt in chair at beginning of session, agreeable to therapy; reporting posterior neck pain up to occcipital region but did not formally rate; RN approved       Social/Functional History  Social/Functional History  Lives With: Alone  Type of Home: Assisted living [facility]  Home Layout: One level  Home Access: Level entry  Bathroom Shower/Tub: Walk-in shower  Bathroom Toilet: Handicap height  Bathroom Equipment: Grab bars in shower, Shower chair, Hand-held shower  Home Equipment: Rollator, Oxygen (3-4 L O2 baseline)  Has the patient had two or more falls in the past year or any fall with injury in the past year?: No  Receives Help From:  (staff at [facility])  ADL Assistance: Needs assistance  Homemaking Assistance: Needs assistance  Homemaking Responsibilities: No  Ambulation Assistance: Independent (household distances with 5WR)  Transfer Assistance: Independent  Active : No  Occupation: Retired  Type of Occupation: restaurant owner  Additional Comments: 1 fall in the last year. Objective   Heart Rate: 74  Heart Rate Source: Monitor  BP: (!) 115/54  BP Location: Left upper arm  BP Method: Automatic  Patient Position: Sitting;Up in chair  MAP (Calculated): 74.33  Resp: 18  SpO2: 99 %  O2 Device: Nasal cannula          Observation/Palpation  Posture: Good  Safety Devices  Type of Devices: All fall risk precautions in place;Call light within reach;Gait belt;Patient at risk for falls; Bed alarm in place;Nurse notified; Left in chair;Chair alarm in place  Restraints  Restraints Initially in Place: No  Bed Mobility Training  Bed Mobility Training: No  Transfer Training  Transfer Training: Yes  Sit to Stand: Minimum assistance  Stand to Sit: Contact-guard assistance  Toilet Transfer: Contact-guard assistance (to in stance at toilet)       AROM: Generally decreased, functional  Strength: Generally decreased, functional  Coordination: Generally decreased, functional  Tone: Normal  Sensation:  (did not note numbness/tingling)    ADL  Grooming: Setup  Grooming Skilled Clinical Factors: sani wipes in chair  LE Dressing: Dependent/Total  LE Dressing Skilled Clinical Factors: adjusting socks  Toileting: Contact guard assistance  Toileting Skilled Clinical Factors: urination in stance     Activity Tolerance  Activity Tolerance: Patient tolerated evaluation without incident;Patient limited by fatigue;Patient limited by endurance; Patient limited by pain  Activity Tolerance Comments: 120/61, 77 bpm, 98%        Vision  Vision: Impaired (eyes do not track in similar directions when looking straight ahead)  Hearing  Hearing: Within functional limits    Cognition  Overall Cognitive Status: Exceptions  Arousal/Alertness: Appropriate responses to stimuli  Following Commands: Follows all commands without difficulty  Attention Span: Attends with cues to redirect  Safety Judgement: Decreased awareness of need for safety  Insights: Decreased awareness of deficits  Initiation: Does not require cues  Sequencing: Does not require cues  Cognition Comment: pt very talkative and required cues to redirect  Orientation  Overall Orientation Status: Within Functional Limits  Perception  Overall Perceptual Status: [facility]               Education Given To: Patient  Education Provided: Role of Therapy;Plan of Care;Precautions;Transfer Training;Energy Conservation; ADL Adaptive Strategies  Education Provided Comments: disease specific: CHF education  Education Method: Printed Information/Hand-outs; Teach Back;Verbal  Barriers to Learning: None  Education Outcome: Verbalized understanding                      OCCUPATIONAL THERAPY HEART FAILURE EDUCATION    OT Heart Failure Education Goals    Patient educated and demonstrates /verbalizes appropriate teach back with ability to self rate on MICHAEL and identify HF activity zone, prior to initiation of ADLs to appropriately determine need for daily activity level/ energy conservation/pacing. [] Goal Met, [] Goal Not Met, not addressed this date, CTA    Patient demonstrates /verbalizes understanding of appropriate employment of Energy Conservation with every day activity.    [] Goal Met, [] Goal Not Met, not addressed this date, CTA    Patient demonstrates/verbalizes ability to correctly identify mL to cups conversion, what constitutes FLUID in diet, and knowledge of when to communicate changes in weight to physician consistent with patient orders and HF education. [x] Goal Met, [] Goal Not Met, [] Goal not appropriate for pt     Pt voices and demonstrates appropriate teach back of Heart Failure Education Program with the use of:  [] Energy Conservation techniques                              [] Choosing daily activity level  [x] Importance of fluid restriction             [] Importance of managing medication with organization and scheduling. Pt will benefit from reinforcement of education due to   [] Readiness to learn                               [] Decreased cognition  [] Language barrier                                  [] Decreased vision/hearing  [x] First introduction to new information      [x] Current Living (LTC, SNF, etc)  []Other:    Education provided via:  [] Oral instruction  [] Demonstration  [] Written handout    ------------------------------------------------------------------------------------------------------------------------------------                    1)Heart Failure Zones Self Check Plan referencing Red/Yellow/Green Light Symbol with:  [] Green Zone/All Clear:   physical activity is normal for you,   No new swelling in feet, ankles, legs or stomach,   No weight gain of more than 2-3 pounds,   No chest pain or worsening of shortness of breath. (Continue daily: weight check, meds as directed, low salt eating, monitoring of fluid intake, balance activity, follow up visits)  [] Yellow Zone/Caution:   Increased cough or shortness of breath with activity  Increased swelling in your feet, ankles, legs or stomach from baseline  Weight gain or loss of more than 2-3 pounds in 1 day.   Increase in the number of pillows needed while sleeping  (Check In!: You need to contact your doctor or provider as soon as possible)  [] Red Zone/Medical Alert:  Unrelieved chest pain or shortness of breath, especially while resting  Increased Discomfort or swelling in the abdomen or lower body  Sudden weight gain of more than 5 pounds in a week  Increased cough with bubbly and/or pink sputum  (Warning: You need to be seen right away . If you cannot reach your physician, call 911)    Patient educated in and []demonstrated/[]verbalized understanding of identifying HF activity zone with the Self Check Plan referencing Red/Yellow/Green Light Symbol. *prior to ADL's/activity  to appropriately determine daily activity level*  ------------------------------------------------------------------------------------------------------------------------------------         2)Alana Rating of Perceived Exertion (RPE) Scale     The Alana rating scale ranges from 6 to 20, where 6 means \"no exertion at all\" and 20 means \"maximal exertion. \" The patient chooses the number that best describes their level of exertion. This will objectify the intensity level of the patient's activity, and the therapist can use this information to accelerate or decelerate activity levels to reach the desired range. The patient should appraise their feeling of exertion as honestly as possible, without thinking about what the actual physical load is. Their feeling of effort and exertion is important, and it should not be compared to the level of others. Alana RPE Scale  Activity Completed: n/a    [] 6  No exertion at all  [] 7  Extremely light  [] 8  [] 9  Very light (For a healthy person, it is like walking slowly at his or her own pace for some minutes)  []10  []11  Light  []12  []13  Somewhat hard (exercise, but it still feels OK to continue)  []14  []15  Hard (heavy)  []16  []17  Very hard (can still go on, but really has to push himself. It feels very heavy, and the person is very tired.)  []18  []19  Extremely hard (For most people this is the most strenuous exercise they have ever experienced.)  []20  Maximal exertion.     Patient educated in and []demonstrated/[]verbalized understanding []teach back  []Rating self on ALANA and   []Identifying HF activity zone with the Self Check Plan referencing Red/Yellow/Green Light Symbol *:prior to ADls/activity to appropriately determine daily activity level.    ------------------------------------------------------------------------------------------------------------------------------------         3) 5 P's of Energy Conservation    Pt was educated on the following aspects of Energy Conservation techniques. Prioritize/Plan: Decide what needs to be done today, and what can wait for a later date, write to do lists, Plan ahead to avoid extra trips, Gather supplies and equipment needed before starting an activity. Position: Avoid tiring and awkward posture that may impair breathing  Pace: Slow and steady pace, never rushing! Pursed lip breathing. Pursed Lip Breathing: \"Smell the roses, blow out the candles\"    Patient []demonstrates / []verbalizes understanding of appropriate employment of Energy Conservation with every day activity.    ------------------------------------------------------------------------------------------------------------------------------------         5) Fluid intake tracking    Patient  []demonstrates/[x]verbalizes ability to correctly identify mL to cups conversion, what constitutes FLUID in diet, and  knowledge of when to  communicate changes in weight to physician consistent with patient orders and HF education.       Pt participated in the following fluid tracking management   [x] Identifying 4, 8, and 12 ounces of fluid size  [x] Identify mL to cup conversion  [x] Understanding Jello, watermelon and Ice cream contributing to fluid intake   [x] Acknowledge current fluid restriction limits/orders  Pt required assistance or correction of error in the followin ml to Oz and what constitutes as liquid    [] Not appropriate for patient    AM-PAC Score        AM-PAC Inpatient Daily Activity Raw Score: 16 (22)  AM-PAC Inpatient ADL T-Scale Score : 35.96 (22)  ADL Inpatient CMS 0-100% Score: 53.32 (08/25/22 1329)  ADL Inpatient CMS G-Code Modifier : CK (08/25/22 1329)    Goals  Short Term Goals  Time Frame for Short term goals: within 7 days by 9/01 unless noted  Short Term Goal 1: pt will perform sit <>stand at toilet with CGA  Short Term Goal 2: pt will perform LB dressing including threading of clothing with Min A  Short Term Goal 3: pt will perform standing ADLs at sink with SBA and LRAD  Short Term Goal 4: pt will perform BUE ther ex x10 in stance to increase functional endurance with rest breaks PRN by 8/30  Short Term Goal 5: pt will meed 1 of 3 HF goals- GOAL MET 8/25  Patient Goals   Patient goals : to improve breathing       Therapy Time   Individual Concurrent Group Co-treatment   Time In 1156         Time Out 1220         Minutes 24         Timed Code Treatment Minutes: 14 Minutes (10 minute eval)       Mary Jacobs, OT

## 2022-08-25 NOTE — DISCHARGE INSTR - COC
Continuity of Care Form    Patient Name: Karyle Muscat   :  1955  MRN:  [id number]    Admit date:  2022  Discharge date:  2022    Code Status Order: Limited   Advance Directives:     Admitting Physician:  Adelia Troncoso MD  PCP: No primary care provider on file. Discharging Nurse: [facility] Unit/Room#: 0207/0207-01  Discharging Unit Phone Number: [phone]    Emergency Contact:   Extended Emergency Contact Information  Primary Emergency Contact: Mariia Weinerimuli 6   [address] Phone: [phone]  Mobile Phone: [phone]  Relation: Other   needed?  No  Secondary Emergency Contact: Morgan Vallejo Phone: [phone]  Mobile Phone: [phone]  Relation: Other    Past Surgical History:  Past Surgical History:   Procedure Laterality Date    CARDIAC SURGERY      Dec 27 2010, triple bypass    CHOLECYSTECTOMY  2011    HERNIA REPAIR  age2    OTHER SURGICAL HISTORY  11 REPAIR LOWER STERNAL INCISION, REMOVAL OF ONE STERNAL WIRE,    OTHER SURGICAL HISTORY  10/10/2014    phacoemulsification of cataract with intraocular lens implant left eye    PAIN MANAGEMENT PROCEDURE N/A 2/10/2022    MIDLINE CERVICAL SIX SEVEN EPIDURAL STEROID INJECTION SITE CONFIRMED BY FLUOROSCOPY performed by Camilo Tucker MD at [facility] 18 N/A 2022    MIDLINE TO RIGHT CERVICAL SIX SEVEN EPIDURAL STEROID INJECTION SITE CONFIRMED BY FLUOROSCOPY performed by Camilo Tucker MD at [facility] ENDOSCOPY         Immunization History:   Immunization History   Administered Date(s) Administered    COVID-19, PFIZER PURPLE top, DILUTE for use, (age 15 y+), 30mcg/0.3mL 2021, 2021, 2021    Influenza Virus Vaccine 2012, 10/02/2017, 10/01/2018, 2019, 2020    Influenza, FLUAD, (age 72 y+), Adjuvanted 2020, 2021    Influenza, FLUARIX, FLULAVAL, (age 10 mo+) AND Yolis Young (age 1 y+), PF 09/26/2016, 10/02/2017, 09/29/2020    Influenza, FLUCELVAX, (age 10 mo+), MDCK, PF 10/05/2018    Influenza, High Dose (Fluzone 65 yrs and older) 10/01/2019    Influenza, Triv, inactivated, subunit, adjuvanted, IM (Fluad 65 yrs and older) 09/13/2019    Pneumococcal Conjugate 13-valent (Fanjlyc20) 09/13/2019    Pneumococcal Polysaccharide (Wuedearix10) 01/04/2011, 11/27/2014, 12/17/2020       Active Problems:  Patient Active Problem List   Diagnosis Code    DM (diabetes mellitus) ([facility] E11.9    Coronary artery disease involving native coronary artery of native heart without angina pectoris I25.10    Anemia of chronic disease D63.8    Essential hypertension I10    Hyperkalemia E87.5    CKD (chronic kidney disease) stage 3, GFR 30-59 ml/min ([facility]) N18.30    Chronic diastolic heart failure ([facility]) I50.32    Pulmonary hypertension due to left ventricular diastolic dysfunction ([facility]) I27.22    Morbid obesity ([facility]) E66.01    S/P CABG x 3 Z95.1    DM2 (diabetes mellitus, type 2) ([facility]) E11.9    Morbid obesity with BMI of 50.0-59.9, adult ([facility]) E66.01, Z68.43    HLD (hyperlipidemia) E78.5    Cardiomyopathy ([facility]) I42.9    Productive cough R05.8    Chronic pain G89.29    Severe obstructive sleep apnea G47.33    Obesity, Class III, BMI 40-49.9 (morbid obesity) ([facility]) M87.64    Acute diastolic congestive heart failure ([facility]) I50.31    Degeneration of lumbar or lumbosacral intervertebral disc M51.37    Displacement of lumbar intervertebral disc without myelopathy M51.26    Lumbosacral spondylosis without myelopathy M47.817    Lumbar facet arthropathy M47.816    Disc displacement, lumbar M51.26    Spinal stenosis of lumbar region M48.061    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    Tympanic membrane perforation, left H72.92    Chest pain R07.9    Cor pulmonale, chronic ([facility]) I27.81    Pulmonary hypertension due to alveolar hypoventilation disorder ([facility] I27.23    Nonrheumatic aortic valve stenosis I35.0    Chronic neck pain M54.2, G89.29    Dyspnea Z46.96    Acute diastolic CHF (congestive heart failure) ([facility]) I50.31    Cervical stenosis of spinal canal M48.02    Degeneration of cervical intervertebral disc M50.30    Cervical radiculitis M54.12    Acute on chronic diastolic CHF (congestive heart failure) ([facility]) I50.33    Acute respiratory failure with hypoxia ([facility]) J96.01    CKD (chronic kidney disease) N18.9    PAF (paroxysmal atrial fibrillation) ([facility]) X85.2    Acute diastolic HF (heart failure) ([facility]) I50.31    Subdural hematoma ([facility]) S06.5X9A    SDH (subdural hematoma) ([facility]) S06.5X9A    Chronic respiratory failure with hypoxia ([facility]) J96.11    Fluid overload E87.70       Isolation/Infection:   Isolation            No Isolation          Patient Infection Status       Infection Onset Added Last Indicated Last Indicated By Review Planned Expiration Resolved Resolved By    None active    Resolved    COVID-19 (Rule Out) 03/07/22 03/07/22 03/07/22 COVID-19, Rapid (Ordered)   03/07/22 Rule-Out Test Resulted    COVID-19 (Rule Out) 01/13/22 01/13/22 01/13/22 SARS-CoV-2 NAAT (Rapid) (Ordered)   01/13/22 Rule-Out Test Resulted    COVID-19 (Rule Out) 08/23/21 08/23/21 08/23/21 COVID-19, Rapid (Ordered)   08/23/21 Rule-Out Test Resulted            Nurse Assessment:  Last Vital Signs: BP (!) 115/54   Pulse 74   Temp 97.7 °F (36.5 °C) (Oral)   Resp 18   Ht 5' 9\" (1.753 m)   Wt 299 lb 3.2 oz (135.7 kg)   SpO2 98%   BMI 44.18 kg/m²     Last documented pain score (0-10 scale): Pain Level: 8  Last Weight:   Wt Readings from Last 1 Encounters:   08/25/22 299 lb 3.2 oz (135.7 kg)     Mental Status:  oriented    IV Access:  - None    Nursing Mobility/ADLs:  Walking   Assisted  Transfer  Assisted  Bathing  Assisted  Dressing  Assisted  Toileting  Assisted  Feeding  Independent  Med Admin  Assisted  Med Delivery   whole    Wound Care Documentation and Therapy:  Wound 01/14/22 Leg Left; Lower; Lateral (Active)   Number of days: 222       Wound 01/14/22 Leg Left; Lower; Lateral (Active)   Number of days: 222       Wound 04/04/22 Leg Right; Lower; Inner;Lateral cluster of 2 (Active)   Number of days: 142       Wound 04/26/22 Leg Left; Inner open in clusters (Active)   Number of days: 120        Elimination:  Continence: Bowel: Yes  Bladder: Yes  Urinary Catheter: None   Colostomy/Ileostomy/Ileal Conduit: No       Date of Last BM: 9/1/2022    Intake/Output Summary (Last 24 hours) at 8/25/2022 1150  Last data filed at 8/25/2022 0852  Gross per 24 hour   Intake 800 ml   Output 3625 ml   Net -2825 ml     I/O last 3 completed shifts: In: 600 [P.O.:600]  Out: [address] [Urine:3325]    Safety Concerns: At Risk for Falls    Impairments/Disabilities:      None    Nutrition Therapy:  Current Nutrition Therapy:   - Oral Diet:  General    Routes of Feeding: Oral  Liquids: Thin Liquids  Daily Fluid Restriction: yes - amount 1500  Last Modified Barium Swallow with Video (Video Swallowing Test): not done    Treatments at the Time of Hospital Discharge:   Respiratory Treatments: None  Oxygen Therapy:  is on oxygen at 4 L/min per nasal cannula.   Ventilator:    - No ventilator support    Rehab Therapies: Physical Therapy and Occupational Therapy  Weight Bearing Status/Restrictions: No weight bearing restrictions  Other Medical Equipment (for information only, NOT a DME order):  walker  Other Treatments: None    Patient's personal belongings (please select all that are sent with patient):  Belongings sent home with friends    RN SIGNATURE:  Electronically signed by Charla Hashimoto, RN on 9/1/22 at 5:55 PM EDT    CASE MANAGEMENT/SOCIAL WORK SECTION    Inpatient Status Date: 8/24/22    Readmission Risk Assessment Score:  Readmission Risk              Risk of Unplanned Readmission:  53         Discharging to Facility/ Agency   Name: Chanell Leija   Address:  Phone: [phone]  RWX:[phone]    / signature: Electronically signed by Mitzi Deutsch RN on 9/1/22 at 1:01 PM EDT    PHYSICIAN SECTION    Prognosis: Poor    Condition at Discharge: Stable    Rehab Potential (if transferring to Rehab): Guarded    Recommended Labs or Other Treatments After Discharge: Follow-up with PCP as soon as possible. Follow-up with GI for hepatorenal syndrome and repeat paracentesis, call to make an appointment. Follow-up with Nephrology as soon as possible  Follow-up with Cardiology as soon as possible  Metolazone increased to three times weekly. Physician Certification: I certify the above information and transfer of Meryle Nay  is necessary for the continuing treatment of the diagnosis listed and that he requires {Admit to Appropriate Level of Care:02236} for {GREATER/LESS:586449384} 30 days.      Update Admission H&P: {CHP DME Changes in RGCRP:[id number]}    PHYSICIAN SIGNATURE:  {Esignature:[id number]}

## 2022-08-25 NOTE — PROGRESS NOTES
Department of Internal Medicine  Nephrology Progress Note        SUBJECTIVE:    We are following this patient for A/CKD. The patient was seen and examined; he was resting comfortably with no acute events noted overnight. ROS: No fever or chills. Social: No family at bedside. Physical Exam:    VITALS:  BP (!) 115/54   Pulse 74   Temp 97.7 °F (36.5 °C) (Oral)   Resp 18   Ht 5' 9\" (1.753 m)   Wt 299 lb 3.2 oz (135.7 kg)   SpO2 99%   BMI 44.18 kg/m²     General appearance: Seems comfortable, no acute distress. Neck: Trachea midline, thyroid normal.   Lungs:  Non labored breathing, CTA to anterior auscultation. Heart:  S1S2 normal, rub or gallop. ++ peripheral edema. Abdomen: Soft, non-tender, no organomegaly. Skin: No lesions or rashes, warm to touch.      DATA:    CBC with Differential:    Lab Results   Component Value Date/Time    WBC 6.0 08/25/2022 06:38 AM    RBC 4.02 08/25/2022 06:38 AM    HGB 10.7 08/25/2022 06:38 AM    HCT 33.1 08/25/2022 06:38 AM     08/25/2022 06:38 AM    MCV 82.4 08/25/2022 06:38 AM    MCH 26.6 08/25/2022 06:38 AM    MCHC 32.2 08/25/2022 06:38 AM    RDW 19.2 08/25/2022 06:38 AM    SEGSPCT 81 10/05/2016 12:04 PM    BANDSPCT 12.0 03/02/2012 06:30 AM    LYMPHOPCT 8.5 08/25/2022 06:38 AM    MONOPCT 6.5 08/25/2022 06:38 AM    EOSPCT 1.0 05/26/2022 12:00 AM    BASOPCT 0.5 08/25/2022 06:38 AM    MONOSABS 0.4 08/25/2022 06:38 AM    LYMPHSABS 0.5 08/25/2022 06:38 AM    EOSABS 0.1 08/25/2022 06:38 AM    BASOSABS 0.0 08/25/2022 06:38 AM    DIFFTYPE Auto-K 02/04/2013 10:11 AM     BMP:    Lab Results   Component Value Date/Time     08/25/2022 06:38 AM    K 3.0 08/25/2022 06:38 AM    K 3.8 05/28/2022 10:57 PM    CL 92 08/25/2022 06:38 AM    CO2 34 08/25/2022 06:38 AM     08/25/2022 06:38 AM    LABALBU 3.9 08/25/2022 06:38 AM    CREATININE 1.7 08/25/2022 06:38 AM    CALCIUM 9.5 08/25/2022 06:38 AM    GFRAA 49 08/25/2022 06:38 AM    GFRAA 53 06/06/2013 02:22 PM

## 2022-08-25 NOTE — CONSULTS
Nutrition Note    RD received consult for CHF nutrition education. Pt declines need for diet education at this time, pt c/o pain. Pt resides at [facility], food prepared for pt. Denies consuming high sodium content foods. Denies any food restrictions. Will continue to monitor per nutrition standards of care.      Itz Rocha MS, RD, LD on 8/25/2022 at 11:17 AM  Office: [phone]

## 2022-08-25 NOTE — PROGRESS NOTES
Physical Therapy  Facility/Department: [facility]  Physical Therapy Initial Assessment    Name: Dominic Collazo  : 1955  MRN: [id number]  Date of Service: 2022    Discharge Recommendations:  [address] with PT   PT Equipment Recommendations  Equipment Needed: No  Other: pt owns 4WW      Patient Diagnosis(es): There were no encounter diagnoses. Past Medical History:  has a past medical history of Anemia, Angina, Arthritis, CAD (coronary artery disease), CHF (congestive heart failure) ([address] CKD (chronic kidney disease) stage 3, GFR 30-59 ml/min ([facility]), Clostridium difficile diarrhea, Diabetes mellitus ([address] Diabetic neuropathy ([address] Disease of blood and blood forming organ, Dizziness, GERD (gastroesophageal reflux disease), Headache, Hearing loss, Hyperlipidemia, Hypertension, Kidney stone, Refusal of blood transfusions as patient is Denominational, Sleep apnea, Tinnitus, Venous ulcer ([address] and Wound, open. Past Surgical History:  has a past surgical history that includes hernia repair (age2); Cholecystectomy (2011); other surgical history (11-16-11 REPAIR LOWER STERNAL INCISION, REMOVAL OF ONE STERNAL WIRE,); Upper gastrointestinal endoscopy; Cardiac surgery; other surgical history (10/10/2014); Pain management procedure (N/A, 2/10/2022); and Pain management procedure (N/A, 2022). Assessment   Body Structures, Functions, Activity Limitations Requiring Skilled Therapeutic Intervention: Decreased functional mobility ; Decreased ADL status; Decreased balance;Decreased endurance;Decreased safe awareness;Decreased strength; Increased pain  Assessment: Patient seen for PT evaluation, gait, and standing balance. Patient cleared by RN for therapy participation this date. Patient agreeable to therapy. Patient admitted for acute on chronic CKD and CHF. Pt previously mod I with [id number] for household distances at [facility].  Patient completed mobility with SBA and RW short distances to/from bathroom. Pt requesting to return to bed at end of session d/t pain. P.T .will continue to follow throughout LOS. Recommending DC to ECF with PT given decreased endurance. Treatment Diagnosis: decreased endurance  Therapy Prognosis: Good  Decision Making: Low Complexity  Barriers to Learning: none  Requires PT Follow-Up: Yes  Activity Tolerance  Activity Tolerance: Patient tolerated evaluation without incident;Patient limited by fatigue;Patient limited by endurance; Patient limited by pain  Activity Tolerance Comments: 120/61, 77 bpm, 98%     Plan   Plan  Plan: 3-5 times per week  Current Treatment Recommendations: Strengthening, Balance training, Functional mobility training, Transfer training, Gait training, Endurance training, Patient/Caregiver education & training, Safety education & training, Home exercise program, Therapeutic activities  Safety Devices  Type of Devices: All fall risk precautions in place, Call light within reach, Gait belt, Patient at risk for falls, Left in bed, Bed alarm in place, Nurse notified  Restraints  Restraints Initially in Place: No     Restrictions  Restrictions/Precautions  Restrictions/Precautions: General Precautions  Position Activity Restriction  Other position/activity restrictions: up with assist, tele, 1500 ml fluid restriction, maintain heels off bed     Subjective   Pain: Pt reporting pain in back and neck, does not formally rate  General  Chart Reviewed: Yes  Patient assessed for rehabilitation services?: Yes  Response To Previous Treatment: Not applicable  Family / Caregiver Present: No  Referring Practitioner:  Candelaria  Referral Date : 08/24/22  Diagnosis: SOB  Follows Commands: Within Functional Limits  Subjective  Subjective: Pt standing with PCA, preparing to walk to toilet         Social/Functional History  Social/Functional History  Lives With: Alone  Type of Home: Assisted living [facility]  Home Layout: One level  Home Access: Level entry  [place] Shower/Tub: Walk-in shower  Bathroom Toilet: Handicap height  Bathroom Equipment: Grab bars in shower, Shower chair, Hand-held shower  Home Equipment: Rollator, Oxygen (3-4 L O2 baseline)  Has the patient had two or more falls in the past year or any fall with injury in the past year?: No  Receives Help From:  (staff at [facility])  ADL Assistance: Needs assistance  Homemaking Assistance: Needs assistance  Homemaking Responsibilities: No  Ambulation Assistance: Independent (household distances with 0CI)  Transfer Assistance: Independent  Active : No  Occupation: Retired  Type of Occupation: restaurant owner  Additional Comments: 1 fall in the last year. Vision/Hearing  Vision  Vision: Impaired    Cognition   Orientation  Overall Orientation Status: Within Functional Limits     Objective   Heart Rate: 77  Heart Rate Source: Monitor  BP: 120/61  BP Location: Left upper arm  BP Method: Automatic  Patient Position: Sitting  MAP (Calculated): 80.67  Resp: 18  SpO2: 98 %  O2 Device: Nasal cannula     Observation/Palpation  Posture: Good        AROM RLE (degrees)  RLE AROM: WFL  AROM LLE (degrees)  LLE AROM : WFL  Strength RLE  Strength RLE: WFL  Strength LLE  Strength LLE: WFL           Bed mobility  Sit to Supine: Supervision  Transfers  Sit to Stand: Stand by assistance  Stand to sit: Stand by assistance  Ambulation  Surface: level tile  Device: Rolling Walker  Assistance: Stand by assistance  Quality of Gait: Pt ambulates with slow amanuel, wide CHAPARRITA, short step length/height and increased trunk flexion without LOB. Gait Deviations: Slow Amanuel; Increased CHAPARRITA; Decreased step length;Decreased step height  Distance: 2x 10 ft               AM-PAC Score  AM-PAC Inpatient Mobility Raw Score : 16 (08/25/22 0958)  AM-PAC Inpatient T-Scale Score : 40.78 (08/25/22 0958)  Mobility Inpatient CMS 0-100% Score: 54.16 (08/25/22 0958)  Mobility Inpatient CMS G-Code Modifier : CK (08/25/22 8263)        Goals  Short Term Goals  Time Frame for Short term goals: 8/30/22  Short term goal 1: Pt will complete bed mobility mod I. Short term goal 2: Pt will complete transfers with walker mod I. Short term goal 3: Pt will ambulate 50 ft with walker mod I. Short term goal 4: Pt will tolerate 12-15 reps of LE Exercises to progress  strength and endurance by 8/30. Short term goal 5: Pt will meet 4/4 HF education goals. Patient Goals   Patient goals : Pt does not indicate. Education  Patient Education  Education Given To: Patient  Education Provided: Role of Therapy;Transfer Training;Plan of Care;Energy Conservation;Precautions;Orientation  Education Provided Comments: Pt educated on need for safety, safe use of RW - requires reinforcement  Education Method: Verbal  Barriers to Learning: None  Education Outcome: Verbalized understanding      Therapy Time   Individual Concurrent Group Co-treatment   Time In 0907         Time Out 0950         Minutes 43         Timed Code Treatment Minutes: 33 Minutes (10 min eval)     If pt is unable to be seen after this session, please let this note serve as discharge summary. Please see case management note for discharge disposition. Thank you.     Tari Anna, PT

## 2022-08-25 NOTE — PROGRESS NOTES
Hospitalist Progress Note      PCP: No primary care provider on file. Date of Admission: 8/24/2022    Chief Complaint: WEIGHT GAIN, SHORTNESS OF [facility] Course: 75 y.o. male with past medical history significant for CHF, CAD, CKD, DM2 with peripheral neuropathy, hypertension, hyperlipidemia, morbid obesity, constipation, chronic pain, CAD s/p CABG. He was in the ER 8/18 for similar complaints and left AMA due to wait time (he sated 32 hours). He went to his cardiologist appointment today for worsening BLE edema, dyspnea, and fluid in his abdomen. He was direct admitted from there in order to by-pass the ED. He reports a 20lb weight gain. He denies denies chest pain, n/v/d, dysuria, fever, chills, and headache; reports neuropathy, and constipation. There is no radiation of symptoms. No aggravating or alleviating factors. Subjective: denies chest pain, n/v/d, dysuria, fever, chills, headache.  Reports dyspnea, edema      Medications:  Reviewed    Infusion Medications    sodium chloride      furosemide (LASIX) 1mg/ml infusion 10 mg/hr (08/25/22 1023)    dextrose       Scheduled Medications    magnesium oxide  400 mg Oral BID    insulin glargine  40 Units SubCUTAneous Nightly    [START ON 8/26/2022] potassium chloride  20 mEq Oral Daily    sodium chloride flush  5-40 mL IntraVENous 2 times per day    enoxaparin  30 mg SubCUTAneous BID    allopurinol  300 mg Oral Daily    atorvastatin  80 mg Oral Nightly    carvedilol  3.125 mg Oral BID WC    polyethylene glycol  17 g Oral Daily    ferrous sulfate  325 mg Oral BID    pantoprazole  40 mg Oral QAM AC    pregabalin  25 mg Oral BID    tamsulosin  0.4 mg Oral Daily    insulin lispro  0-8 Units SubCUTAneous TID WC    insulin lispro  0-4 Units SubCUTAneous Nightly    senna  1 tablet Oral Nightly    fluticasone  1 spray Each Nostril Daily    midodrine  5 mg Oral TID WC     PRN Meds: magnesium hydroxide, cyclobenzaprine, sodium chloride flush, sodium chloride, polyethylene glycol, acetaminophen **OR** acetaminophen, perflutren lipid microspheres, oxyCODONE-acetaminophen, glucose, dextrose bolus **OR** dextrose bolus, glucagon (rDNA), dextrose, prochlorperazine      Intake/Output Summary (Last 24 hours) at 8/25/2022 1404  Last data filed at 8/25/2022 1200  Gross per 24 hour   Intake 920 ml   Output 3225 ml   Net -2305 ml       Physical Exam Performed:    BP (!) 115/54   Pulse 74   Temp 97.7 °F (36.5 °C) (Oral)   Resp 18   Ht 5' 9\" (1.753 m)   Wt 299 lb 3.2 oz (135.7 kg)   SpO2 99%   BMI 44.18 kg/m²     General appearance: Sitting in chair, comfortable. No apparent distress, appears stated age and cooperative. HEENT: Pupils equal, round, and reactive to light. Conjunctivae/corneas clear. Neck: Supple, with full range of motion. No jugular venous distention. Trachea midline. Respiratory: Normal respiratory effort. Clear to auscultation, bilaterally without Rales/Wheezes/Rhonchi. 4L per NC  Cardiovascular: Regular rate and rhythm with normal S1/S2 without murmurs, rubs or gallops. Abdomen: Soft, non-tender, non-distended with normal bowel sounds. Ascites present  Musculoskeletal: No clubbing, cyanosis bilaterally. BLE 3+ edema. Full range of motion without deformity. Skin: Skin color, texture, turgor normal. No rashes or lesions. Neurologic: Neurovascularly intact without any focal sensory/motor deficits.  Cranial nerves: II-XII intact, grossly non-focal.  Psychiatric: Alert and oriented, thought content appropriate, normal insight  Capillary Refill: Brisk,3 seconds, normal   Peripheral Pulses: +2 palpable, equal bilaterally       Labs:   Recent Labs     08/24/22  1216 08/25/22  0638   WBC 6.2 6.0   HGB 10.7* 10.7*   HCT 33.5* 33.1*    160     Recent Labs     08/24/22  1216 08/25/22  0638   * 138   K 3.4* 3.0*   CL 91* 92*   CO2 32 34*   * 111*   CREATININE 1.9* 1.7*   CALCIUM 9.4 9.5   PHOS 4.1  --      Recent Labs     08/25/22  0244 and treatment. Placing patient at risk for multiple co-morbidities as well as early death and contributing to the patient's presentation  -Counseled on weight loss. DVT Prophylaxis: lovenox  Diet: ADULT DIET; Regular; 4 carb choices (60 gm/meal);  Low Sodium (2 gm); 1500 ml  Code Status: Limited    PT/OT Eval Status: not indicated    Dispo - >2 days    Orma Pap, APRN - CNP

## 2022-08-26 LAB
ANION GAP SERPL CALCULATED.3IONS-SCNC: 11 MMOL/L (ref 3–16)
BASOPHILS ABSOLUTE: 0 K/UL (ref 0–0.2)
BASOPHILS RELATIVE PERCENT: 0.6 %
BUN BLDV-MCNC: 112 MG/DL (ref 7–20)
CALCIUM SERPL-MCNC: 9.7 MG/DL (ref 8.3–10.6)
CHLORIDE BLD-SCNC: 90 MMOL/L (ref 99–110)
CO2: 34 MMOL/L (ref 21–32)
CREAT SERPL-MCNC: 1.9 MG/DL (ref 0.8–1.3)
EOSINOPHILS ABSOLUTE: 0.1 K/UL (ref 0–0.6)
EOSINOPHILS RELATIVE PERCENT: 2.7 %
GFR AFRICAN AMERICAN: 43
GFR NON-AFRICAN AMERICAN: 35
GLUCOSE BLD-MCNC: 160 MG/DL (ref 70–99)
GLUCOSE BLD-MCNC: 219 MG/DL (ref 70–99)
GLUCOSE BLD-MCNC: 222 MG/DL (ref 70–99)
GLUCOSE BLD-MCNC: 222 MG/DL (ref 70–99)
GLUCOSE BLD-MCNC: 223 MG/DL (ref 70–99)
HCT VFR BLD CALC: 32.4 % (ref 40.5–52.5)
HEMOGLOBIN: 10.4 G/DL (ref 13.5–17.5)
LV EF: 53 %
LVEF MODALITY: NORMAL
LYMPHOCYTES ABSOLUTE: 0.5 K/UL (ref 1–5.1)
LYMPHOCYTES RELATIVE PERCENT: 10.3 %
MAGNESIUM: 2 MG/DL (ref 1.8–2.4)
MCH RBC QN AUTO: 26.5 PG (ref 26–34)
MCHC RBC AUTO-ENTMCNC: 32.2 G/DL (ref 31–36)
MCV RBC AUTO: 82.5 FL (ref 80–100)
MONOCYTES ABSOLUTE: 0.4 K/UL (ref 0–1.3)
MONOCYTES RELATIVE PERCENT: 8.3 %
NEUTROPHILS ABSOLUTE: 4 K/UL (ref 1.7–7.7)
NEUTROPHILS RELATIVE PERCENT: 78.1 %
PDW BLD-RTO: 18.9 % (ref 12.4–15.4)
PERFORMED ON: ABNORMAL
PLATELET # BLD: 154 K/UL (ref 135–450)
PMV BLD AUTO: 8.7 FL (ref 5–10.5)
POTASSIUM SERPL-SCNC: 3.5 MMOL/L (ref 3.5–5.1)
RBC # BLD: 3.93 M/UL (ref 4.2–5.9)
SODIUM BLD-SCNC: 135 MMOL/L (ref 136–145)
WBC # BLD: 5.1 K/UL (ref 4–11)

## 2022-08-26 PROCEDURE — C8929 TTE W OR WO FOL WCON,DOPPLER: HCPCS

## 2022-08-26 PROCEDURE — 83735 ASSAY OF MAGNESIUM: CPT

## 2022-08-26 PROCEDURE — 85025 COMPLETE CBC W/AUTO DIFF WBC: CPT

## 2022-08-26 PROCEDURE — 6360000002 HC RX W HCPCS

## 2022-08-26 PROCEDURE — 97116 GAIT TRAINING THERAPY: CPT

## 2022-08-26 PROCEDURE — 80048 BASIC METABOLIC PNL TOTAL CA: CPT

## 2022-08-26 PROCEDURE — 36415 COLL VENOUS BLD VENIPUNCTURE: CPT

## 2022-08-26 PROCEDURE — 97110 THERAPEUTIC EXERCISES: CPT

## 2022-08-26 PROCEDURE — 2580000003 HC RX 258: Performed by: INTERNAL MEDICINE

## 2022-08-26 PROCEDURE — 6360000002 HC RX W HCPCS: Performed by: INTERNAL MEDICINE

## 2022-08-26 PROCEDURE — 6370000000 HC RX 637 (ALT 250 FOR IP): Performed by: INTERNAL MEDICINE

## 2022-08-26 PROCEDURE — 6370000000 HC RX 637 (ALT 250 FOR IP)

## 2022-08-26 PROCEDURE — 6360000004 HC RX CONTRAST MEDICATION

## 2022-08-26 PROCEDURE — 2700000000 HC OXYGEN THERAPY PER DAY

## 2022-08-26 PROCEDURE — 2060000000 HC ICU INTERMEDIATE R&B

## 2022-08-26 PROCEDURE — 94761 N-INVAS EAR/PLS OXIMETRY MLT: CPT

## 2022-08-26 PROCEDURE — 2580000003 HC RX 258

## 2022-08-26 PROCEDURE — 99233 SBSQ HOSP IP/OBS HIGH 50: CPT | Performed by: NURSE PRACTITIONER

## 2022-08-26 RX ORDER — POTASSIUM CHLORIDE 20 MEQ/1
40 TABLET, EXTENDED RELEASE ORAL ONCE
Status: COMPLETED | OUTPATIENT
Start: 2022-08-26 | End: 2022-08-26

## 2022-08-26 RX ADMIN — INSULIN GLARGINE 50 UNITS: 100 INJECTION, SOLUTION SUBCUTANEOUS at 22:19

## 2022-08-26 RX ADMIN — INSULIN LISPRO 4 UNITS: 100 INJECTION, SOLUTION INTRAVENOUS; SUBCUTANEOUS at 12:54

## 2022-08-26 RX ADMIN — OXYCODONE AND ACETAMINOPHEN 1 TABLET: 325; 5 TABLET ORAL at 05:05

## 2022-08-26 RX ADMIN — ALLOPURINOL 300 MG: 300 TABLET ORAL at 09:40

## 2022-08-26 RX ADMIN — PANTOPRAZOLE SODIUM 40 MG: 40 TABLET, DELAYED RELEASE ORAL at 05:05

## 2022-08-26 RX ADMIN — POLYETHYLENE GLYCOL 3350 17 G: 17 POWDER, FOR SOLUTION ORAL at 09:41

## 2022-08-26 RX ADMIN — POTASSIUM CHLORIDE 40 MEQ: 1500 TABLET, EXTENDED RELEASE ORAL at 09:41

## 2022-08-26 RX ADMIN — FUROSEMIDE 10 MG/HR: 10 INJECTION, SOLUTION INTRAMUSCULAR; INTRAVENOUS at 15:19

## 2022-08-26 RX ADMIN — OXYCODONE AND ACETAMINOPHEN 1 TABLET: 325; 5 TABLET ORAL at 12:51

## 2022-08-26 RX ADMIN — MAGNESIUM HYDROXIDE 30 ML: 1200 LIQUID ORAL at 09:40

## 2022-08-26 RX ADMIN — ENOXAPARIN SODIUM 30 MG: 100 INJECTION SUBCUTANEOUS at 22:19

## 2022-08-26 RX ADMIN — METHYLNALTREXONE BROMIDE 20.2 MG: 12 INJECTION, SOLUTION SUBCUTANEOUS at 15:14

## 2022-08-26 RX ADMIN — FLUTICASONE PROPIONATE 1 SPRAY: 50 SPRAY, METERED NASAL at 09:42

## 2022-08-26 RX ADMIN — FERROUS SULFATE TAB 325 MG (65 MG ELEMENTAL FE) 325 MG: 325 (65 FE) TAB at 22:21

## 2022-08-26 RX ADMIN — Medication 400 MG: at 09:40

## 2022-08-26 RX ADMIN — INSULIN LISPRO 5 UNITS: 100 INJECTION, SOLUTION INTRAVENOUS; SUBCUTANEOUS at 09:49

## 2022-08-26 RX ADMIN — SENNOSIDES 8.6 MG: 8.6 TABLET, COATED ORAL at 22:21

## 2022-08-26 RX ADMIN — ATORVASTATIN CALCIUM 80 MG: 80 TABLET, FILM COATED ORAL at 22:21

## 2022-08-26 RX ADMIN — CARVEDILOL 3.12 MG: 3.12 TABLET, FILM COATED ORAL at 17:56

## 2022-08-26 RX ADMIN — MIDODRINE HYDROCHLORIDE 5 MG: 5 TABLET ORAL at 12:51

## 2022-08-26 RX ADMIN — CYCLOBENZAPRINE 5 MG: 10 TABLET, FILM COATED ORAL at 09:57

## 2022-08-26 RX ADMIN — SODIUM CHLORIDE, PRESERVATIVE FREE 10 ML: 5 INJECTION INTRAVENOUS at 22:21

## 2022-08-26 RX ADMIN — POLYETHYLENE GLYCOL 3350 17 G: 17 POWDER, FOR SOLUTION ORAL at 12:53

## 2022-08-26 RX ADMIN — CYCLOBENZAPRINE 5 MG: 10 TABLET, FILM COATED ORAL at 22:20

## 2022-08-26 RX ADMIN — Medication 400 MG: at 22:21

## 2022-08-26 RX ADMIN — FUROSEMIDE 10 MG/HR: 10 INJECTION, SOLUTION INTRAMUSCULAR; INTRAVENOUS at 05:07

## 2022-08-26 RX ADMIN — MIDODRINE HYDROCHLORIDE 5 MG: 5 TABLET ORAL at 09:57

## 2022-08-26 RX ADMIN — FERROUS SULFATE TAB 325 MG (65 MG ELEMENTAL FE) 325 MG: 325 (65 FE) TAB at 09:40

## 2022-08-26 RX ADMIN — INSULIN LISPRO 5 UNITS: 100 INJECTION, SOLUTION INTRAVENOUS; SUBCUTANEOUS at 12:55

## 2022-08-26 RX ADMIN — PREGABALIN 25 MG: 25 CAPSULE ORAL at 22:21

## 2022-08-26 RX ADMIN — MIDODRINE HYDROCHLORIDE 5 MG: 5 TABLET ORAL at 17:56

## 2022-08-26 RX ADMIN — ENOXAPARIN SODIUM 30 MG: 100 INJECTION SUBCUTANEOUS at 09:39

## 2022-08-26 RX ADMIN — INSULIN LISPRO 5 UNITS: 100 INJECTION, SOLUTION INTRAVENOUS; SUBCUTANEOUS at 17:56

## 2022-08-26 RX ADMIN — PERFLUTREN 1.65 MG: 6.52 INJECTION, SUSPENSION INTRAVENOUS at 15:19

## 2022-08-26 RX ADMIN — TAMSULOSIN HYDROCHLORIDE 0.4 MG: 0.4 CAPSULE ORAL at 05:05

## 2022-08-26 RX ADMIN — CARVEDILOL 3.12 MG: 3.12 TABLET, FILM COATED ORAL at 09:40

## 2022-08-26 RX ADMIN — OXYCODONE AND ACETAMINOPHEN 1 TABLET: 325; 5 TABLET ORAL at 22:21

## 2022-08-26 RX ADMIN — INSULIN LISPRO 4 UNITS: 100 INJECTION, SOLUTION INTRAVENOUS; SUBCUTANEOUS at 09:48

## 2022-08-26 ASSESSMENT — PAIN SCALES - GENERAL
PAINLEVEL_OUTOF10: 9
PAINLEVEL_OUTOF10: 0
PAINLEVEL_OUTOF10: 8
PAINLEVEL_OUTOF10: 5
PAINLEVEL_OUTOF10: 9
PAINLEVEL_OUTOF10: 6
PAINLEVEL_OUTOF10: 4

## 2022-08-26 ASSESSMENT — PAIN DESCRIPTION - DESCRIPTORS
DESCRIPTORS: PRESSURE
DESCRIPTORS: ACHING;DISCOMFORT;THROBBING
DESCRIPTORS: ACHING

## 2022-08-26 ASSESSMENT — PAIN DESCRIPTION - LOCATION
LOCATION: NECK
LOCATION: GENERALIZED
LOCATION: GENERALIZED
LOCATION: NECK

## 2022-08-26 ASSESSMENT — PAIN - FUNCTIONAL ASSESSMENT
PAIN_FUNCTIONAL_ASSESSMENT: ACTIVITIES ARE NOT PREVENTED
PAIN_FUNCTIONAL_ASSESSMENT: PREVENTS OR INTERFERES SOME ACTIVE ACTIVITIES AND ADLS

## 2022-08-26 ASSESSMENT — PAIN DESCRIPTION - PAIN TYPE
TYPE: CHRONIC PAIN
TYPE: CHRONIC PAIN

## 2022-08-26 NOTE — PROGRESS NOTES
[address]   Daily Progress Note    Admit Date:  8/24/2022  HPI:  No chief complaint on file. Interval history: Stacey Ch is being followed for CHF, CAD, mild AS. Admitted with anasarca and volume overload despite torsemide 100mg BID and metolazone twice a week. He is now living at [facility]. Subjective:  Mr. Susanna Ricks remains on lasix infusion. Remains with anasarca   No chest pain. Main complaint is constipation. Objective:   /82   Pulse 82   Temp 98.1 °F (36.7 °C) (Oral)   Resp 20   Ht 5' 9\" (1.753 m)   Wt 295 lb 9.6 oz (134.1 kg)   SpO2 97%   BMI 43.65 kg/m²     Intake/Output Summary (Last 24 hours) at 8/26/2022 1513  Last data filed at 8/26/2022 1329  Gross per 24 hour   Intake 821.24 ml   Output 2500 ml   Net -1678.76 ml       NYHA: IV     Physical Exam:  General:  Awake, alert, NAD.  Obese   Skin:  Warm and dry  Neck:  JVD difficult to assess  Chest:  Clear to auscultation, no wheezes/rhonchi/rales  Telemetry: afib   Cardiovascular:  irregular  S1S2, no m/r/g   Abdomen:  + edema of pannus  +bowel sounds  Extremities:  +++ bilateral lower extremity edema    Medications:    methylnaltrexone  0.15 mg/kg SubCUTAneous Once    magnesium oxide  400 mg Oral BID    potassium chloride  20 mEq Oral Daily    insulin glargine  50 Units SubCUTAneous Nightly    insulin lispro  0-16 Units SubCUTAneous TID WC    insulin lispro  0-4 Units SubCUTAneous Nightly    insulin lispro  5 Units SubCUTAneous TID     sodium chloride flush  5-40 mL IntraVENous 2 times per day    enoxaparin  30 mg SubCUTAneous BID    allopurinol  300 mg Oral Daily    atorvastatin  80 mg Oral Nightly    carvedilol  3.125 mg Oral BID WC    polyethylene glycol  17 g Oral Daily    ferrous sulfate  325 mg Oral BID    pantoprazole  40 mg Oral QAM AC    pregabalin  25 mg Oral BID    tamsulosin  0.4 mg Oral Daily    senna  1 tablet Oral Nightly    fluticasone  1 spray Each Nostril Daily    midodrine  5 mg Oral TID WC      sodium chloride      furosemide (LASIX) 1mg/ml infusion 10 mg/hr (08/26/22 0507)    dextrose         Lab Data:  CBC:   Recent Labs     08/24/22  1216 08/25/22  0638 08/26/22  0709   WBC 6.2 6.0 5.1   HGB 10.7* 10.7* 10.4*    160 154     BMP:    Recent Labs     08/24/22  1216 08/25/22  0638 08/25/22  1400 08/26/22  0709   * 138  --  135*   K 3.4* 3.0* 3.3* 3.5   CO2 32 34*  --  34*   * 111*  --  112*   CREATININE 1.9* 1.7*  --  1.9*     INR:  No results for input(s): INR in the last 72 hours. BNP:    Recent Labs     08/24/22  1216   PROBNP 6,892*     Lab Results   Component Value Date    LVEF 67 03/10/2022       Testing:    Echo 9/2016  Left ventricular systolic function is normal with ejection fraction   estimated at 55 %. Diastolic septal flattening consistent with right ventricular overload. Diastolic filling parameters suggests grade II diastolic dysfunction. Mild aortic stenosis. Right ventricle is mildly enlarged. Right ventricular systolic function is normal, TAPSE 19 mm. Inadequate tricuspid regurgitation jet to estimate systolic pulmonary artery   pressure (SPAP). Echocardiogram 1/13/2022   Summary   Limited only f/u for LVEF and aortic valve stenosis. Technically difficult examination. Low normal left ventricular systolic function with ejection fraction of 50%. Moderate aortic stenosis. Aortic stenosis values appear approximately same when compared to echo on 8-. Mild to moderate aortic regurgitation. Principal Problem:    Fluid overload  Active Problems:    Essential hypertension    CKD (chronic kidney disease) stage 3, GFR 30-59 ml/min ([facility])    Chronic diastolic heart failure ([facility])    Obesity, Class III, BMI 40-49.9 (morbid obesity) ([address]  Resolved Problems:    * No resolved hospital problems.  *      Assessment:  Fluid overload/anasarca  MARGARITO on CKD  Chronic diastolic heart failure  Obese  CAD s/p CABG  PAF- no anticoagulation per patient choice   moderate AS  Severe HENNA  Chronic hypoxemic respiratory failure  Anemia - pateint is a [employer]       Plan:  Daily weights and daily BMP  Diuretics per nephrology; agree with lasix infusion  Continue lipitor  Continue coreg 3.125mg BID  Consider abd U/s to see if large volume ascites that would warrant a paracentesis. Diuresis per nephrology- appreciate assistance. Cardiology will follow peripherally, please call with questions.        PATRICK Barboza - CNP,  8/26/2022, 3:13 PM

## 2022-08-26 NOTE — PROGRESS NOTES
Pt noncompliant with elevating legs and ace wraps. Pt educated on importance of this. He verbalized understanding but continues to refuse. Will continue to educate.

## 2022-08-26 NOTE — PLAN OF CARE
Problem: Pain  Goal: Verbalizes/displays adequate comfort level or baseline comfort level  Outcome: Progressing  Note: Pt will be satisfied with pain control. Pt uses numeric pain rating scale with reassessments after pain med administration. Will continue to monitor progression throughout shift. Problem: Safety - Adult  Goal: Free from fall injury  Outcome: Progressing  Note: Pt high fall risk. Instructed to use call light before getting out of bed. Call light within reach. Bed in low position. Bed alarm on. Will continue to monitor. Problem: Chronic Conditions and Co-morbidities  Goal: Patient's chronic conditions and co-morbidity symptoms are monitored and maintained or improved  Outcome: Progressing  Note: Pt will have accuchecks before meals and at bedtime with sliding scale insulin in place for coverage. Will continue to monitor for signs and symptoms of hypoglycemia and hyperglycemia throughout shift. Patient's EF (Ejection Fraction) is greater than 40%    Heart Failure Medications:  Diuretics[de-identified] Furosemide    (One of the following REQUIRED for EF </= 40%/SYSTOLIC FAILURE but MAY be used in EF% >40%/DIASTOLIC FAILURE)        ACE[de-identified] None        ARB[de-identified] None         ARNI[de-identified] None    (Beta Blockers)  NON- Evidenced Based Beta Blocker (for EF% >40%/DIASTOLIC FAILURE): None    Evidenced Based Beta Blocker::(REQUIRED for EF% <40%/SYSTOLIC FAILURE) Carvedilol- Coreg  . .................................................................................................................................................. Patient's weights and intake/output reviewed: Yes    Patient's Last Weight: 295 lbs obtained by standing scale. Difference of 4 lbs less than last documented weight.       Intake/Output Summary (Last 24 hours) at 8/26/2022 1942  Last data filed at 8/26/2022 1600  Gross per 24 hour   Intake 821.24 ml   Output 1600 ml   Net -778.76 ml       Comorbidities Reviewed Yes    Patient has a past medical history of Anemia, Angina, Arthritis, CAD (coronary artery disease), CHF (congestive heart failure) ([facility] [address] CKD (chronic kidney disease) stage 3, GFR 30-59 ml/min ([facility]), Clostridium difficile diarrhea, Diabetes mellitus ([facility] [address] Diabetic neuropathy ([facility] [address] Disease of blood and blood forming organ, Dizziness, GERD (gastroesophageal reflux disease), Headache, Hearing loss, Hyperlipidemia, Hypertension, Kidney stone, Refusal of blood transfusions as patient is [employer], Sleep apnea, Tinnitus, Venous ulcer ([facility] [address] and Wound, open. >>For CHF and Comorbidity documentation on Education Time and Topics, please see Education Tab    Progressive Mobility Assessment:  What is this patient's Current Level of Mobility?: Ambulatory- with Assistance  How was this patient Mobilized today?: Edge of Bed, Up to Chair, and  Up to Toilet/Shower, ambulated 10 ft                 With Whom? Nurse, PCA, PT, and OT                 Level of Difficulty/Assistance: 1x Assist     Pt up in chair at this time on  4 L O2. Pt with complaints of shortness of breath. Pt with pitting lower extremity edema.      Patient and/or Family's stated Goal of Care this Admission: reduce shortness of breath, increase activity tolerance, better understand heart failure and disease management, be more comfortable, and reduce lower extremity edema prior to discharge        :

## 2022-08-26 NOTE — PROGRESS NOTES
sputum  (Warning: You need to be seen right away . If you cannot reach your physician, call 911)    2)Alana Rating of Perceived Exertion (RPE) Scale     The Alana rating scale ranges from 6 to 20, where 6 means \"no exertion at all\" and 20 means \"maximal exertion. \" The patient chooses the number that best describes their level of exertion. This will objectify the intensity level of the patient's activity, and the therapist can use this information to accelerate or decelerate activity levels to reach the desired range. The patient should appraise their feeling of exertion as honestly as possible, without thinking about what the actual physical load is. Their feeling of effort and exertion is important, and it should not be compared to the level of others. Patient's should target exercises for very light to moderate (9-11/20) for this phase of their rehab. Alana RPE Scale    Activity Completed:  Seated exercises    []6  No exertion at all  []7  Extremely light  []8  [x] 9  Very light (For a healthy person, it is like walking slowly at his or her own pace for some minutes)  []10  []11  Light  []12  []13  Somewhat hard (exercise, but it still feels OK to continue)  []14  []15  Hard (heavy)  []16  []17  Very hard (can still go on, but really has to push himself. It feels very heavy, and the person is very tired. )[]18  []19  Extremely hard (For most people this is the most strenuous exercise they have ever experienced.)  []20  Maximal exertion. 3) Pt educated in and completed Therapeutic exercise (as indicated below for 1 set) to promote circulation and prevent complications of bedrest with patient verbalizes understanding of employing green zone, yellow zone and red zone to seek provider input and evaluation.   Educated patient in :  []Supine    Level 1: Bed Exercise 10-15 reps, 2x/day  []Ankle Pumps  []Heel Slides  []Hip Abduction  []Buttocks Squeeze  []Diaphragmatic Breathing with TA set  []Shoulder Shrugs  []Bicep Curl  []Hands open/close                          [x]Sitting    Level 2: Seated Exercises 10-15 reps, 2x/day  [x]Toe raise/heel raise  [x]Long Arc Quad  [x]Seated March  [x]Seated Clamshell  []Diaphragmatic Breathing with TA set and BUE ER and IR  [x]Shoulder Shrugs  [x]Bicep Curls  [x]Hands open/close                         []Standing   Level 3: Standing Exercises 10-15 reps, 2x/day     []Sit to/from stand  []Standing march  []Standing side steps  []Standing bilateral heel raise  []Diaphragmatic breathing with TA set and BUE flex/ext  []Shoulder Shrugs  []Bicep Curls  []Hands open/close                        [x]WalkingLevel 1: Educated patient in initiating walking when in the Green zone and to Start with 2-5 minutes daily and work up to 10 minutes per day. Walk at a slow, comfortable pace with your exertion level Very Light (MICHAEL 9) to Light (MICHAEL 11)   You should be able to comfortably hold a conversation while walking. 4)Daily Weight check/Stepping on Scale  [x]Pt educated in importance of checking body weight daily and []demonstrate/[]verbalizes safety in:stepping up to weigh self and complete downward gaze/head nod to read scale on standard scale  and safety stepping off scale. []Barriers to completion of Daily weight check: difficulty negotiating scale at home -- would benefit from practice and RW for increased support/safety      5)Pt voices and demonstrates appropriate teach back of Heart Failure Education Program with : use of the   [x]1. Identifying Appropriate Zone from HF Zone Self-Check Plan                          [x]2. Rating self on MICHAEL. [x]3. Therapeutic exercise/walking program      [x]4. Importance of taking daily weight measurement             []5.  Safely stepping on and off scale    []Pt will benefit from reinforcement of education due to   []Readiness to learn                               []Decreased cognition  []Language barrier []Decreased vision/hearing  []First introduction to new information    []Requires intermittent cues  []Other:    Education provided via:  [x]Oral instruction  [x]Demonstration  [x]Written handout            Education  Patient Education  Education Given To: Patient  Education Provided: Role of Therapy;Transfer Training;Plan of Care;Energy Conservation;Precautions  Education Provided Comments: HF education completed today  Education Method: Verbal;Demonstration;Printed Information/Hand-outs  Barriers to Learning: None  Education Outcome: Verbalized understanding    Therapy Time   Individual Concurrent Group Co-treatment   Time In 0953         Time Out 1016         Minutes 23         Timed Code Treatment Minutes: Tomy, PT, DPT  If pt is unable to be seen after this session, please let this note serve as discharge summary. Please see case management note for discharge disposition. Thank you.

## 2022-08-26 NOTE — PLAN OF CARE
Problem: Discharge Planning  Goal: Discharge to home or other facility with appropriate resources  Outcome: Progressing  Flowsheets (Taken 8/25/2022 2150)  Discharge to home or other facility with appropriate resources: Identify discharge learning needs (meds, wound care, etc)     Problem: Pain  Goal: Verbalizes/displays adequate comfort level or baseline comfort level  8/25/2022 2150 by Janeth Turner RN  Outcome: Progressing  Flowsheets (Taken 8/25/2022 2150)  Verbalizes/displays adequate comfort level or baseline comfort level:   Encourage patient to monitor pain and request assistance   Assess pain using appropriate pain scale     Problem: Skin/Tissue Integrity  Goal: Absence of new skin breakdown  Description: 1. Monitor for areas of redness and/or skin breakdown  2. Assess vascular access sites hourly  3. Every 4-6 hours minimum:  Change oxygen saturation probe site  4. Every 4-6 hours:  If on nasal continuous positive airway pressure, respiratory therapy assess nares and determine need for appliance change or resting period.   Outcome: Progressing     Problem: Safety - Adult  Goal: Free from fall injury  8/25/2022 2150 by Janeth Turner RN  Outcome: Progressing     Problem: Chronic Conditions and Co-morbidities  Goal: Patient's chronic conditions and co-morbidity symptoms are monitored and maintained or improved  8/25/2022 2150 by Janeth Turner RN  Outcome: Progressing  Flowsheets (Taken 8/25/2022 2150)  Care Plan - Patient's Chronic Conditions and Co-Morbidity Symptoms are Monitored and Maintained or Improved: Monitor and assess patient's chronic conditions and comorbid symptoms for stability, deterioration, or improvement     Problem: ABCDS Injury Assessment  Goal: Absence of physical injury  Outcome: Progressing  Flowsheets (Taken 8/25/2022 2150)  Absence of Physical Injury: Implement safety measures based on patient assessment     Problem: Respiratory - Adult  Goal: Achieves optimal ventilation and oxygenation  Outcome: Progressing  Flowsheets (Taken 8/25/2022 2150)  Achieves optimal ventilation and oxygenation: Assess for changes in respiratory status     Problem: Cardiovascular - Adult  Goal: Maintains optimal cardiac output and hemodynamic stability  Outcome: Progressing  Flowsheets (Taken 8/25/2022 2150)  Maintains optimal cardiac output and hemodynamic stability:   Monitor blood pressure and heart rate   Assess for signs of decreased cardiac output     Problem: Cardiovascular - Adult  Goal: Absence of cardiac dysrhythmias or at baseline  Outcome: Progressing  Flowsheets (Taken 8/25/2022 2150)  Absence of cardiac dysrhythmias or at baseline: Monitor cardiac rate and rhythm     Problem: Musculoskeletal - Adult  Goal: Return ADL status to a safe level of function  Outcome: Progressing  Flowsheets (Taken 8/25/2022 2150)  Return ADL Status to a Safe Level of Function: Assess activities of daily living deficits and provide assistive devices as needed     Problem: Gastrointestinal - Adult  Goal: Maintains or returns to baseline bowel function  Outcome: Progressing  Flowsheets (Taken 8/25/2022 2150)  Maintains or returns to baseline bowel function: Assess bowel function     Problem: Metabolic/Fluid and Electrolytes - Adult  Goal: Electrolytes maintained within normal limits  Outcome: Progressing  Flowsheets (Taken 8/25/2022 2150)  Electrolytes maintained within normal limits: Monitor labs and assess patient for signs and symptoms of electrolyte imbalances     Problem: Metabolic/Fluid and Electrolytes - Adult  Goal: Glucose maintained within prescribed range  Outcome: Progressing  Flowsheets (Taken 8/25/2022 2150)  Glucose maintained within prescribed range: Monitor blood glucose as ordered

## 2022-08-26 NOTE — FLOWSHEET NOTE
Pt assess completed per flow sheet. Pt vss. Lasix gtt infusing at 10. POC discussed with pt for night and all questions answered.

## 2022-08-26 NOTE — PROGRESS NOTES
Hospitalist Progress Note      PCP: No primary care provider on file. Date of Admission: 8/24/2022    Chief Complaint: WEIGHT GAIN, SHORTNESS OF [facility]: 75 y.o. male with past medical history significant for CHF, CAD, CKD, DM2 with peripheral neuropathy, hypertension, hyperlipidemia, morbid obesity, constipation, chronic pain, CAD s/p CABG. He was in the ER 8/18 for similar complaints and left AMA due to wait time (he sated 32 hours). He went to his cardiologist appointment today for worsening BLE edema, dyspnea, and fluid in his abdomen. He was direct admitted from there in order to by-pass the ED. He reports a 20lb weight gain. He denies denies chest pain, n/v/d, dysuria, fever, chills, and headache; reports neuropathy, and constipation. There is no radiation of symptoms. No aggravating or alleviating factors. Subjective: denies chest pain, n/v/d, dysuria, fever, chills, headache. Reports dyspnea, edema.  Reports feeling better      Medications:  Reviewed    Infusion Medications    sodium chloride      furosemide (LASIX) 1mg/ml infusion 10 mg/hr (08/26/22 0507)    dextrose       Scheduled Medications    methylnaltrexone  0.15 mg/kg SubCUTAneous Once    magnesium oxide  400 mg Oral BID    potassium chloride  20 mEq Oral Daily    insulin glargine  50 Units SubCUTAneous Nightly    insulin lispro  0-16 Units SubCUTAneous TID WC    insulin lispro  0-4 Units SubCUTAneous Nightly    insulin lispro  5 Units SubCUTAneous TID     sodium chloride flush  5-40 mL IntraVENous 2 times per day    enoxaparin  30 mg SubCUTAneous BID    allopurinol  300 mg Oral Daily    atorvastatin  80 mg Oral Nightly    carvedilol  3.125 mg Oral BID WC    polyethylene glycol  17 g Oral Daily    ferrous sulfate  325 mg Oral BID    pantoprazole  40 mg Oral QAM AC    pregabalin  25 mg Oral BID    tamsulosin  0.4 mg Oral Daily    senna  1 tablet Oral Nightly    fluticasone  1 spray Each Nostril Daily    midodrine 5 mg Oral TID      PRN Meds: cyclobenzaprine, sodium chloride flush, sodium chloride, polyethylene glycol, acetaminophen **OR** acetaminophen, perflutren lipid microspheres, oxyCODONE-acetaminophen, glucose, dextrose bolus **OR** dextrose bolus, glucagon (rDNA), dextrose, prochlorperazine      Intake/Output Summary (Last 24 hours) at 8/26/2022 1334  Last data filed at 8/26/2022 1329  Gross per 24 hour   Intake 821.24 ml   Output 2500 ml   Net -1678.76 ml       Physical Exam Performed:    /82   Pulse 82   Temp 98.1 °F (36.7 °C) (Oral)   Resp 20   Ht 5' 9\" (1.753 m)   Wt 295 lb 9.6 oz (134.1 kg)   SpO2 97%   BMI 43.65 kg/m²     General appearance: Sitting in chair, comfortable. No apparent distress, appears stated age and cooperative. HEENT: Pupils equal, round, and reactive to light. Conjunctivae/corneas clear. Neck: Supple, with full range of motion. No jugular venous distention. Trachea midline. Respiratory: Normal respiratory effort. Clear to auscultation, bilaterally without Rales/Wheezes/Rhonchi. 4L per NC  Cardiovascular: Regular rate and rhythm with normal S1/S2 without murmurs, rubs or gallops. Abdomen: Soft, non-tender, non-distended with normal bowel sounds. Ascites present  Musculoskeletal: No clubbing, cyanosis bilaterally. BLE 3+ edema, ACE wraps in place. Full range of motion without deformity. Skin: Skin color, texture, turgor normal. No rashes or lesions. Neurologic: Neurovascularly intact without any focal sensory/motor deficits.  Cranial nerves: II-XII intact, grossly non-focal.  Psychiatric: Alert and oriented, thought content appropriate, normal insight  Capillary Refill: Brisk,3 seconds, normal   Peripheral Pulses: +2 palpable, equal bilaterally       Labs:   Recent Labs     08/24/22  1216 08/25/22  0638 08/26/22  0709   WBC 6.2 6.0 5.1   HGB 10.7* 10.7* 10.4*   HCT 33.5* 33.1* 32.4*    160 154     Recent Labs     08/24/22  1216 08/25/22  0638 08/25/22  1400 08/26/22  0709   * 138  --  135*   K 3.4* 3.0* 3.3* 3.5   CL 91* 92*  --  90*   CO2 32 34*  --  34*   * 111*  --  112*   CREATININE 1.9* 1.7*  --  1.9*   CALCIUM 9.4 9.5  --  9.7   PHOS 4.1  --   --   --      Recent Labs     08/25/22  0638   AST 13*   ALT 8*   BILIDIR 0.4*   BILITOT 1.0   ALKPHOS 122     No results for input(s): INR in the last 72 hours. No results for input(s): Ardelle Chalk in the last 72 hours. Urinalysis:      Lab Results   Component Value Date/Time    NITRU Negative 05/01/2022 09:53 PM    WBCUA 0-2 11/05/2013 10:31 AM    BACTERIA Rare 11/05/2013 10:31 AM    RBCUA 0-2 11/05/2013 10:31 AM    BLOODU Negative 05/01/2022 09:53 PM    SPECGRAV 1.010 05/01/2022 09:53 PM    GLUCOSEU Negative 05/01/2022 09:53 PM    GLUCOSEU NEGATIVE 02/29/2012 03:17 PM       Radiology:  CT ABDOMEN PELVIS WO CONTRAST Additional Contrast? None   Final Result   1. Hepatosplenomegaly with large amount of ascites. 2. Sigmoid diverticulosis. 3. Cardiomegaly with pulmonary edema in the lung bases consistent with   congestive heart failure. 4. 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 5. Anasarca. 6. Gynecomastia. XR CHEST PORTABLE   Final Result   Worsening compared to the previous evaluation from August 18, 2022 with more   pronounced pulmonary edema related to fluid overload/CHF. Small right   effusion. Right lower lobe atelectasis/consolidation. Assessment/Plan:    Active Hospital Problems    Diagnosis     Fluid overload [E87.70]      Priority: Medium    Obesity, Class III, BMI 40-49.9 (morbid obesity) ([facility]) [E66.01]     Chronic diastolic heart failure ([facility]) [I50.32]     CKD (chronic kidney disease) stage 3, GFR 30-59 ml/min ([facility]) [N18.30]     Essential hypertension [I10]      Acute on chronic diastolic heart failure  -Echo 1/2022: EF 50%; mod AS. Previous mention of elevated filling pressure  -Repeat echo ordered, not done yet  -Daily weights.  On admission 137.1kg, down to 134.1kg  -Strict I&O - pt refuses garcia  -No ACE d/t renal disease  -Continue beta blocker  -BNP on admission 6892  -CHF nurse consulted  -Cardiac diet w/ 1500mL fluid restriction  -Cardiology consulted, appreciate recommendations     Acute kidney injury on CKD stage 3b  -Baseline creat 1.5-1.7. On admission, 1.9  -Likely due to venous congestion 2/2 heart failure  -Avoid nephrotoxics as able  -Nephrology following, appreciate recommendations  -Continue lasix gtt per nephrology   -Monitor    Anasarca  -CT abd/pelvis: hepatosplenomegaly, large amount of ascites; cardiomegaly w/ pulm edema;  -Continue diuresis per renal  -Likely due to cardio-renal syndrome    Azotemia  - on admission  -Nephro is following  -Monitor     Hypokalemia/magnesemia  -Monitor and replete as needed  -Monitor on telemetry  -Please call for replacement as needed, given renal disease will not place standing orders  -Will follow K closely while on lasix gtt     Hyponatremia, mild  -Na 134 on arrival, now 138  -Monitor     Diabetes type II, uncontrolled, with peripheral neuropathy  -A1C 8.9 this admission  -Basal bolus and sliding scale insulin, monitor and adjust as needed  -Prandial coverage added  -Carb control diet  -Continue home dose lyrica     Chronic constipation  -Daily miralax, senna  -Relistor x1, pt reports this has worked for him in the past     History of hypertension  -Borderline hypotension on admission with SBP 91  -Per renal, ideally keep SBP >120  -Continue midodrine  -Monitor     Hyperlipidemia, controlled  -LDL 65 (4/2022)  -Continue high-intensity statin therapy     Chronic hypoxic respiratory failure  -Wears 4L at baseline  -Stable on this at this time  -Monitor     Chronic pain  -Continue home regimen     Goals of care  -Discussed code status with pt, as during previous admission he was DNR-CCA. At this time, pt chose to allow for compressions, medications, and defibrillation.  But does not want intubated. Order placed for Limited code with no intubation checked  -Can continue these discussions as needed  -No need to consult palliative at this time     Morbid obesity  -Body mass index is 24.97 kg/m².   -Complicating assessment and treatment. Placing patient at risk for multiple co-morbidities as well as early death and contributing to the patient's presentation  -Counseled on weight loss. DVT Prophylaxis: lovenox  Diet: ADULT DIET; Regular; 4 carb choices (60 gm/meal);  Low Sodium (2 gm); 1500 ml  Code Status: Limited    PT/OT Eval Status: not indicated    Dispo - >2 days    PATRICK Raya - CNP

## 2022-08-26 NOTE — PROGRESS NOTES
Department of Internal Medicine  Nephrology Progress Note        SUBJECTIVE:    We are following this patient for A/CKD. The patient was seen and examined  C/o constipation  Last 24h uop 2.3l  Wt 295 lbs ( 302 on admission)    ROS: No fever or chills. Social: No family at bedside. Physical Exam:    VITALS:  /65   Pulse 93   Temp 98.1 °F (36.7 °C) (Oral)   Resp 16   Ht 5' 9\" (1.753 m)   Wt 295 lb 9.6 oz (134.1 kg)   SpO2 98%   BMI 43.65 kg/m²     General appearance: Seems comfortable, no acute distress. Neck: Trachea midline, thyroid normal.   Lungs:  Non labored breathing, CTA to anterior auscultation. Heart:  S1S2 normal, rub or gallop. ++ peripheral edema. Abdomen: Soft, non-tender, no organomegaly. Skin: No lesions or rashes, warm to touch.      DATA:    CBC with Differential:    Lab Results   Component Value Date/Time    WBC 5.1 08/26/2022 07:09 AM    RBC 3.93 08/26/2022 07:09 AM    HGB 10.4 08/26/2022 07:09 AM    HCT 32.4 08/26/2022 07:09 AM     08/26/2022 07:09 AM    MCV 82.5 08/26/2022 07:09 AM    MCH 26.5 08/26/2022 07:09 AM    MCHC 32.2 08/26/2022 07:09 AM    RDW 18.9 08/26/2022 07:09 AM    SEGSPCT 81 10/05/2016 12:04 PM    BANDSPCT 12.0 03/02/2012 06:30 AM    LYMPHOPCT 10.3 08/26/2022 07:09 AM    MONOPCT 8.3 08/26/2022 07:09 AM    EOSPCT 1.0 05/26/2022 12:00 AM    BASOPCT 0.6 08/26/2022 07:09 AM    MONOSABS 0.4 08/26/2022 07:09 AM    LYMPHSABS 0.5 08/26/2022 07:09 AM    EOSABS 0.1 08/26/2022 07:09 AM    BASOSABS 0.0 08/26/2022 07:09 AM    DIFFTYPE Auto-K 02/04/2013 10:11 AM     BMP:    Lab Results   Component Value Date/Time     08/25/2022 06:38 AM    K 3.3 08/25/2022 02:00 PM    K 3.8 05/28/2022 10:57 PM    CL 92 08/25/2022 06:38 AM    CO2 34 08/25/2022 06:38 AM     08/25/2022 06:38 AM    LABALBU 3.9 08/25/2022 06:38 AM    CREATININE 1.7 08/25/2022 06:38 AM    CALCIUM 9.5 08/25/2022 06:38 AM    GFRAA 49 08/25/2022 06:38 AM    GFRAA 53 06/06/2013 02:22 PM LABGLOM 40 08/25/2022 06:38 AM    GLUCOSE 198 08/25/2022 06:38 AM       IMPRESSION/RECOMMENDATIONS:      1- A/CKD: The patient has chronic kidney disease with a baseline creatinine of 1.5 to 1.7 mg/dl. His MARGARITO is likely secondary to venous congestion in the setting of diastolic heart failure decompensation. His urinary output is well maintained   -continue IV Lasix drip at 10 mg/h and monitor. 2- Hypokalemia: PRN potassium replacement. Total 60 meq on 8/26  3- HTN: Blood pressure improving with Midodrine. 4- Anemia: Stable hemoglobin, monitor.

## 2022-08-26 NOTE — CONSULTS
[address] [facility] Osiris Rasmussen 1955    History:  Past Medical History:   Diagnosis Date    Anemia     Angina     Arthritis     CAD (coronary artery disease)     CHF (congestive heart failure) ([facility])     CKD (chronic kidney disease) stage 3, GFR 30-59 ml/min ([facility])     Clostridium difficile diarrhea 3/16/12; 2/29/12    positive stool toxin    Diabetes mellitus ([address]     Diabetic neuropathy ([facility]     feet and legs    Disease of blood and blood forming organ     Dizziness     When he moves his head/ loses his balance    GERD (gastroesophageal reflux disease)     gastric ulcer    Headache     Hearing loss     Hyperlipidemia     Hypertension     Kidney stone 2002    Refusal of blood transfusions as patient is Islam     Sleep apnea     Tinnitus     Venous ulcer ([facility]     LLE    Wound, open 1/13/2012       ECHO:  1/14/22   EF 50%  HgA1C:  8/25/22   8.9  Iron Saturation:  4/24/22  15  Ferritin: 4/24/18   328.2    Sleep evaluation: 4/20/22  Pulmonologist:  Brandon Lane NP  Cpap/bipap: yes    ACE/ARB/ARNI:   BB: Carvedilol 3.125 mg BID  Spironolactone:  SGLT2:  Ferrous Sulfate 325mg BID    Last Hospital Admission:  5/29/22  chronic pain  Discharge plans: dandy R [address]: patient has advance directives scanned in the chart    Chart review completed. Patient a 75year old male, admitted for fluid overload. HOspital day 2, currently on A2. Pt was in the ER earilier this month and chose to leave AMA. [id number] [facility] admission pt was direct admit from Cardiology office. Pt admitted for IV lasix drip. Cardiology and nephrology working together for fluid overload. Pt very familiar to writer from previous admissions. Pt has within the last several months became a LTC resident at [facility]. There they help to monitor for s/s of CHF including daily weights. They assist with medication management and diet/fluid restrictions.        Patient recent weights and intake/output reviewed:    Patient Vitals for the past 96 hrs (Last 3 readings):   Weight   08/26/22 0244 295 lb 9.6 oz (134.1 kg)   08/25/22 0427 299 lb 3.2 oz (135.7 kg)   08/24/22 1132 (!) 302 lb 3.2 oz (137.1 kg)         Intake/Output Summary (Last 24 hours) at 8/26/2022 0909  Last data filed at 8/26/2022 0535  Gross per 24 hour   Intake 821.24 ml   Output 2000 ml   Net -1178.76 ml     Education Time: chart review completed.      Ana Rodriguez RN     8/26/2022 9:09 AM

## 2022-08-27 LAB
ANION GAP SERPL CALCULATED.3IONS-SCNC: 11 MMOL/L (ref 3–16)
BASOPHILS ABSOLUTE: 0 K/UL (ref 0–0.2)
BASOPHILS RELATIVE PERCENT: 0.7 %
BUN BLDV-MCNC: 122 MG/DL (ref 7–20)
CALCIUM SERPL-MCNC: 10.4 MG/DL (ref 8.3–10.6)
CHLORIDE BLD-SCNC: 94 MMOL/L (ref 99–110)
CO2: 33 MMOL/L (ref 21–32)
CREAT SERPL-MCNC: 2.1 MG/DL (ref 0.8–1.3)
EOSINOPHILS ABSOLUTE: 0.2 K/UL (ref 0–0.6)
EOSINOPHILS RELATIVE PERCENT: 3.6 %
GFR AFRICAN AMERICAN: 38
GFR NON-AFRICAN AMERICAN: 32
GLUCOSE BLD-MCNC: 121 MG/DL (ref 70–99)
GLUCOSE BLD-MCNC: 137 MG/DL (ref 70–99)
GLUCOSE BLD-MCNC: 142 MG/DL (ref 70–99)
GLUCOSE BLD-MCNC: 156 MG/DL (ref 70–99)
GLUCOSE BLD-MCNC: 158 MG/DL (ref 70–99)
HCT VFR BLD CALC: 29.8 % (ref 40.5–52.5)
HEMOGLOBIN: 9.7 G/DL (ref 13.5–17.5)
LYMPHOCYTES ABSOLUTE: 0.6 K/UL (ref 1–5.1)
LYMPHOCYTES RELATIVE PERCENT: 11.1 %
MAGNESIUM: 2.1 MG/DL (ref 1.8–2.4)
MCH RBC QN AUTO: 26.7 PG (ref 26–34)
MCHC RBC AUTO-ENTMCNC: 32.5 G/DL (ref 31–36)
MCV RBC AUTO: 82.2 FL (ref 80–100)
MONOCYTES ABSOLUTE: 0.4 K/UL (ref 0–1.3)
MONOCYTES RELATIVE PERCENT: 8.9 %
NEUTROPHILS ABSOLUTE: 3.8 K/UL (ref 1.7–7.7)
NEUTROPHILS RELATIVE PERCENT: 75.7 %
PDW BLD-RTO: 18.8 % (ref 12.4–15.4)
PERFORMED ON: ABNORMAL
PLATELET # BLD: 147 K/UL (ref 135–450)
PMV BLD AUTO: 8.5 FL (ref 5–10.5)
POTASSIUM SERPL-SCNC: 3.5 MMOL/L (ref 3.5–5.1)
RBC # BLD: 3.63 M/UL (ref 4.2–5.9)
SODIUM BLD-SCNC: 138 MMOL/L (ref 136–145)
WBC # BLD: 5 K/UL (ref 4–11)

## 2022-08-27 PROCEDURE — 6370000000 HC RX 637 (ALT 250 FOR IP): Performed by: INTERNAL MEDICINE

## 2022-08-27 PROCEDURE — 2580000003 HC RX 258

## 2022-08-27 PROCEDURE — 6360000002 HC RX W HCPCS

## 2022-08-27 PROCEDURE — 94761 N-INVAS EAR/PLS OXIMETRY MLT: CPT

## 2022-08-27 PROCEDURE — 2700000000 HC OXYGEN THERAPY PER DAY

## 2022-08-27 PROCEDURE — 6360000002 HC RX W HCPCS: Performed by: INTERNAL MEDICINE

## 2022-08-27 PROCEDURE — 6370000000 HC RX 637 (ALT 250 FOR IP)

## 2022-08-27 PROCEDURE — 80048 BASIC METABOLIC PNL TOTAL CA: CPT

## 2022-08-27 PROCEDURE — 83735 ASSAY OF MAGNESIUM: CPT

## 2022-08-27 PROCEDURE — 2060000000 HC ICU INTERMEDIATE R&B

## 2022-08-27 PROCEDURE — 85025 COMPLETE CBC W/AUTO DIFF WBC: CPT

## 2022-08-27 PROCEDURE — 97530 THERAPEUTIC ACTIVITIES: CPT

## 2022-08-27 PROCEDURE — 2580000003 HC RX 258: Performed by: INTERNAL MEDICINE

## 2022-08-27 PROCEDURE — 97110 THERAPEUTIC EXERCISES: CPT

## 2022-08-27 PROCEDURE — 36415 COLL VENOUS BLD VENIPUNCTURE: CPT

## 2022-08-27 RX ORDER — POLYETHYLENE GLYCOL 3350 17 G/17G
17 POWDER, FOR SOLUTION ORAL 2 TIMES DAILY
Status: DISCONTINUED | OUTPATIENT
Start: 2022-08-27 | End: 2022-09-01 | Stop reason: HOSPADM

## 2022-08-27 RX ADMIN — TAMSULOSIN HYDROCHLORIDE 0.4 MG: 0.4 CAPSULE ORAL at 10:29

## 2022-08-27 RX ADMIN — FERROUS SULFATE TAB 325 MG (65 MG ELEMENTAL FE) 325 MG: 325 (65 FE) TAB at 21:43

## 2022-08-27 RX ADMIN — ENOXAPARIN SODIUM 30 MG: 100 INJECTION SUBCUTANEOUS at 21:42

## 2022-08-27 RX ADMIN — POTASSIUM CHLORIDE 20 MEQ: 1500 TABLET, EXTENDED RELEASE ORAL at 10:29

## 2022-08-27 RX ADMIN — POLYETHYLENE GLYCOL 3350 17 G: 17 POWDER, FOR SOLUTION ORAL at 10:30

## 2022-08-27 RX ADMIN — ATORVASTATIN CALCIUM 80 MG: 80 TABLET, FILM COATED ORAL at 21:43

## 2022-08-27 RX ADMIN — PANTOPRAZOLE SODIUM 40 MG: 40 TABLET, DELAYED RELEASE ORAL at 10:29

## 2022-08-27 RX ADMIN — ALLOPURINOL 300 MG: 300 TABLET ORAL at 10:30

## 2022-08-27 RX ADMIN — INSULIN GLARGINE 50 UNITS: 100 INJECTION, SOLUTION SUBCUTANEOUS at 21:43

## 2022-08-27 RX ADMIN — FERROUS SULFATE TAB 325 MG (65 MG ELEMENTAL FE) 325 MG: 325 (65 FE) TAB at 10:30

## 2022-08-27 RX ADMIN — POLYETHYLENE GLYCOL 3350 17 G: 17 POWDER, FOR SOLUTION ORAL at 21:42

## 2022-08-27 RX ADMIN — MIDODRINE HYDROCHLORIDE 5 MG: 5 TABLET ORAL at 17:02

## 2022-08-27 RX ADMIN — OXYCODONE AND ACETAMINOPHEN 1 TABLET: 325; 5 TABLET ORAL at 17:02

## 2022-08-27 RX ADMIN — MIDODRINE HYDROCHLORIDE 5 MG: 5 TABLET ORAL at 13:16

## 2022-08-27 RX ADMIN — CARVEDILOL 3.12 MG: 3.12 TABLET, FILM COATED ORAL at 10:30

## 2022-08-27 RX ADMIN — INSULIN LISPRO 5 UNITS: 100 INJECTION, SOLUTION INTRAVENOUS; SUBCUTANEOUS at 17:34

## 2022-08-27 RX ADMIN — Medication 400 MG: at 10:29

## 2022-08-27 RX ADMIN — FUROSEMIDE 10 MG/HR: 10 INJECTION, SOLUTION INTRAMUSCULAR; INTRAVENOUS at 12:16

## 2022-08-27 RX ADMIN — SODIUM CHLORIDE, PRESERVATIVE FREE 10 ML: 5 INJECTION INTRAVENOUS at 10:31

## 2022-08-27 RX ADMIN — OXYCODONE AND ACETAMINOPHEN 1 TABLET: 325; 5 TABLET ORAL at 23:40

## 2022-08-27 RX ADMIN — INSULIN LISPRO 5 UNITS: 100 INJECTION, SOLUTION INTRAVENOUS; SUBCUTANEOUS at 13:16

## 2022-08-27 RX ADMIN — FLUTICASONE PROPIONATE 1 SPRAY: 50 SPRAY, METERED NASAL at 10:36

## 2022-08-27 RX ADMIN — ENOXAPARIN SODIUM 30 MG: 100 INJECTION SUBCUTANEOUS at 10:30

## 2022-08-27 RX ADMIN — OXYCODONE AND ACETAMINOPHEN 1 TABLET: 325; 5 TABLET ORAL at 10:29

## 2022-08-27 RX ADMIN — OXYCODONE AND ACETAMINOPHEN 1 TABLET: 325; 5 TABLET ORAL at 04:29

## 2022-08-27 RX ADMIN — MIDODRINE HYDROCHLORIDE 5 MG: 5 TABLET ORAL at 10:28

## 2022-08-27 RX ADMIN — INSULIN LISPRO 5 UNITS: 100 INJECTION, SOLUTION INTRAVENOUS; SUBCUTANEOUS at 10:34

## 2022-08-27 RX ADMIN — Medication 400 MG: at 21:43

## 2022-08-27 RX ADMIN — FUROSEMIDE 10 MG/HR: 10 INJECTION, SOLUTION INTRAMUSCULAR; INTRAVENOUS at 01:22

## 2022-08-27 RX ADMIN — SENNOSIDES 8.6 MG: 8.6 TABLET, COATED ORAL at 21:43

## 2022-08-27 ASSESSMENT — PAIN - FUNCTIONAL ASSESSMENT: PAIN_FUNCTIONAL_ASSESSMENT: ACTIVITIES ARE NOT PREVENTED

## 2022-08-27 ASSESSMENT — PAIN SCALES - WONG BAKER
WONGBAKER_NUMERICALRESPONSE: 0
WONGBAKER_NUMERICALRESPONSE: 0

## 2022-08-27 ASSESSMENT — PAIN SCALES - GENERAL
PAINLEVEL_OUTOF10: 8
PAINLEVEL_OUTOF10: 8
PAINLEVEL_OUTOF10: 0
PAINLEVEL_OUTOF10: 6

## 2022-08-27 ASSESSMENT — PAIN DESCRIPTION - LOCATION: LOCATION: GENERALIZED

## 2022-08-27 NOTE — PROGRESS NOTES
Spoke with Dr. Sander Muse regarding increase in kidney function. Per Dr. Sander Muse, ok to continue lasix gtt.

## 2022-08-27 NOTE — PROGRESS NOTES
Placed call to on call nephrology to notify of increased kidney function and clarify continuing lasix gtt. Awaiting response.

## 2022-08-27 NOTE — PROGRESS NOTES
Hospitalist Progress Note      PCP: No primary care provider on file. Date of Admission: 8/24/2022    Chief Complaint: WEIGHT GAIN, SHORTNESS OF [facility]: 75 y.o. male with past medical history significant for CHF, CAD, CKD, DM2 with peripheral neuropathy, hypertension, hyperlipidemia, morbid obesity, constipation, chronic pain, CAD s/p CABG. He was in the ER 8/18 for similar complaints and left AMA due to wait time (he sated 32 hours). He went to his cardiologist appointment today for worsening BLE edema, dyspnea, and fluid in his abdomen. He was direct admitted from there in order to by-pass the ED. He reports a 20lb weight gain. He denies denies chest pain, n/v/d, dysuria, fever, chills, and headache; reports neuropathy, and constipation. There is no radiation of symptoms. No aggravating or alleviating factors. Subjective: denies chest pain, n/v/d, dysuria, fever, chills, headache. Reports dyspnea, edema.  Reports BM yesterday      Medications:  Reviewed    Infusion Medications    sodium chloride      furosemide (LASIX) 1mg/ml infusion 10 mg/hr (08/27/22 1216)    dextrose       Scheduled Medications    polyethylene glycol  17 g Oral BID    magnesium oxide  400 mg Oral BID    potassium chloride  20 mEq Oral Daily    insulin glargine  50 Units SubCUTAneous Nightly    insulin lispro  0-16 Units SubCUTAneous TID WC    insulin lispro  0-4 Units SubCUTAneous Nightly    insulin lispro  5 Units SubCUTAneous TID     sodium chloride flush  5-40 mL IntraVENous 2 times per day    enoxaparin  30 mg SubCUTAneous BID    allopurinol  300 mg Oral Daily    atorvastatin  80 mg Oral Nightly    carvedilol  3.125 mg Oral BID WC    ferrous sulfate  325 mg Oral BID    pantoprazole  40 mg Oral QAM AC    pregabalin  25 mg Oral BID    tamsulosin  0.4 mg Oral Daily    senna  1 tablet Oral Nightly    fluticasone  1 spray Each Nostril Daily    midodrine  5 mg Oral TID WC     PRN Meds: cyclobenzaprine, sodium chloride flush, sodium chloride, polyethylene glycol, acetaminophen **OR** acetaminophen, oxyCODONE-acetaminophen, glucose, dextrose bolus **OR** dextrose bolus, glucagon (rDNA), dextrose, prochlorperazine      Intake/Output Summary (Last 24 hours) at 8/27/2022 1438  Last data filed at 8/27/2022 1340  Gross per 24 hour   Intake 840 ml   Output 2750 ml   Net -1910 ml       Physical Exam Performed:    /60   Pulse 90   Temp 98.2 °F (36.8 °C) (Oral)   Resp 18   Ht 5' 9\" (1.753 m)   Wt 296 lb 4.8 oz (134.4 kg)   SpO2 95%   BMI 43.76 kg/m²     General appearance: Sitting in chair, feet elevated. Appears comfortable. No apparent distress, appears stated age and cooperative. HEENT: Pupils equal, round, and reactive to light. Conjunctivae/corneas clear. Neck: Supple, with full range of motion. No jugular venous distention. Trachea midline. Respiratory: Normal respiratory effort. Clear to auscultation, bilaterally without Rales/Wheezes/Rhonchi. 4L per NC  Cardiovascular: Regular rate and rhythm with normal S1/S2 without murmurs, rubs or gallops. Abdomen: Soft, non-tender, non-distended with normal bowel sounds. Ascites present  Musculoskeletal: No clubbing, cyanosis bilaterally. BLE 3+ edema, erythematous. ACE wraps removed at time of exam. Full range of motion without deformity. Skin: Skin color, texture, turgor normal. No rashes or lesions. Neurologic: Neurovascularly intact without any focal sensory/motor deficits.  Cranial nerves: II-XII intact, grossly non-focal.  Psychiatric: Alert and oriented, thought content appropriate, normal insight  Capillary Refill: Brisk,3 seconds, normal   Peripheral Pulses: +2 palpable, equal bilaterally       Labs:   Recent Labs     08/25/22  0638 08/26/22  0709 08/27/22  0616   WBC 6.0 5.1 5.0   HGB 10.7* 10.4* 9.7*   HCT 33.1* 32.4* 29.8*    154 147     Recent Labs     08/25/22  0638 08/25/22  1400 08/26/22  0709 08/27/22  0616     --  135* 138   K 3. 0* 3.3* 3.5 3.5   CL 92*  --  90* 94*   CO2 34*  --  34* 33*   *  --  112* 122*   CREATININE 1.7*  --  1.9* 2.1*   CALCIUM 9.5  --  9.7 10.4     Recent Labs     08/25/22  0638   AST 13*   ALT 8*   BILIDIR 0.4*   BILITOT 1.0   ALKPHOS 122     No results for input(s): INR in the last 72 hours. No results for input(s): Prentis Revels in the last 72 hours. Urinalysis:      Lab Results   Component Value Date/Time    NITRU Negative 05/01/2022 09:53 PM    WBCUA 0-2 11/05/2013 10:31 AM    BACTERIA Rare 11/05/2013 10:31 AM    RBCUA 0-2 11/05/2013 10:31 AM    BLOODU Negative 05/01/2022 09:53 PM    SPECGRAV 1.010 05/01/2022 09:53 PM    GLUCOSEU Negative 05/01/2022 09:53 PM    GLUCOSEU NEGATIVE 02/29/2012 03:17 PM       Radiology:  CT ABDOMEN PELVIS WO CONTRAST Additional Contrast? None   Final Result   1. Hepatosplenomegaly with large amount of ascites. 2. Sigmoid diverticulosis. 3. Cardiomegaly with pulmonary edema in the lung bases consistent with   congestive heart failure. 4. 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 5. Anasarca. 6. Gynecomastia. XR CHEST PORTABLE   Final Result   Worsening compared to the previous evaluation from August 18, 2022 with more   pronounced pulmonary edema related to fluid overload/CHF. Small right   effusion. Right lower lobe atelectasis/consolidation. IR US GUIDED PARACENTESIS    (Results Pending)       Assessment/Plan:    Active Hospital Problems    Diagnosis     Fluid overload [E87.70]      Priority: Medium    Obesity, Class III, BMI 40-49.9 (morbid obesity) ([facility]) [E66.01]     Chronic diastolic heart failure ([facility]) [I50.32]     CKD (chronic kidney disease) stage 3, GFR 30-59 ml/min ([facility]) [N18.30]     Essential hypertension [I10]      Acute on chronic diastolic heart failure  -Echo: EF 50-55%; G3 DD; flattened interventricular septum, c/w R sided overload; severe pHTN  -Daily weights. On admission 137.1kg, down to 134.4kg.  Minimal change noted over the past 24 hours  -Strict I&O,diuresed ~2L yesterday  -No ACE d/t renal disease  -Continue beta blocker  -BNP on admission 6892  -CHF nurse consulted  -Cardiac diet w/ 1500mL fluid restriction  -Cardiology consulted, appreciate recommendations     Acute kidney injury on CKD stage 3b  -Baseline creat 1.5-1.7. On admission, 1.9  -Likely due to venous congestion 2/2 heart failure  -Avoid nephrotoxics as able  -Nephrology following, appreciate recommendations  -Continue lasix gtt per nephrology   -Monitor    Anasarca  -CT abd/pelvis: hepatosplenomegaly, large amount of ascites; cardiomegaly w/ pulm edema  -Order for paracentesis placed, as well as fluid studies  -Continue diuresis per renal  -Likely due to cardio-renal syndrome    Azotemia  - on admission  -Nephro is following  -Monitor     Hypokalemia/magnesemia  -Monitor and replete as needed  -Monitor on telemetry  -Please call for replacement as needed, given renal disease will not place standing orders  -Will follow K closely while on lasix gtt     Hyponatremia, mild  -Na 134 on arrival, now 138  -Monitor     Diabetes type II, uncontrolled, with peripheral neuropathy  -A1C 8.9 this admission  -Basal bolus and sliding scale insulin, monitor and adjust as needed  -Prandial coverage added  -Carb control diet  -Continue home dose lyrica     Chronic constipation  -Daily miralax, senna  -Relistor x1 on 8/26     History of hypertension  -Borderline hypotension on admission with SBP 91  -Per renal, ideally keep SBP >120  -Continue midodrine  -Monitor     Hyperlipidemia, controlled  -LDL 65 (4/2022)  -Continue high-intensity statin therapy     Chronic hypoxic respiratory failure  -Wears 4L at baseline  -Stable on this at this time  -Monitor     Chronic pain  -Continue home regimen     Goals of care  -Discussed code status with pt, as during previous admission he was DNR-CCA.  At this time, pt chose to allow for compressions, medications, and defibrillation. But does not want intubated. Order placed for Limited code with no intubation checked  -Can continue these discussions as needed  -No need to consult palliative at this time     Morbid obesity  -Body mass index is 17.31 kg/m².   -Complicating assessment and treatment. Placing patient at risk for multiple co-morbidities as well as early death and contributing to the patient's presentation  -Counseled on weight loss. DVT Prophylaxis: lovenox  Diet: ADULT DIET; Regular; 4 carb choices (60 gm/meal);  Low Sodium (2 gm); 1500 ml  Code Status: Limited    PT/OT Eval Status: not indicated    Dispo - >2 days    Jing Herman, APRN - CNP

## 2022-08-27 NOTE — PROGRESS NOTES
Department of Internal Medicine  Nephrology Progress Note        SUBJECTIVE:    We are following this patient for A/CKD-4  Renal function remains poor. Last 24h uop 2.1 liters with lasix drip. Wt 295 lbs ( 302 on admission)    ROS:   Intake adequate, +weak. Physical Exam:    VITALS:  BP (!) 91/44   Pulse 90   Temp 98.2 °F (36.8 °C) (Oral)   Resp 18   Ht 5' 9\" (1.753 m)   Wt 296 lb 4.8 oz (134.4 kg)   SpO2 94%   BMI 43.76 kg/m²   General appearance: Seems comfortable, no acute distress. Neck: Trachea midline, thyroid normal.   Lungs:  Non labored breathing, CTA to anterior auscultation. Heart:  S1S2 normal, rub or gallop. ++ peripheral edema. Abdomen: Soft, non-tender, no organomegaly, +distended. Skin: No lesions or rashes, warm to touch.      DATA:    CBC with Differential:    Lab Results   Component Value Date/Time    WBC 5.0 08/27/2022 06:16 AM    RBC 3.63 08/27/2022 06:16 AM    HGB 9.7 08/27/2022 06:16 AM    HCT 29.8 08/27/2022 06:16 AM     08/27/2022 06:16 AM    MCV 82.2 08/27/2022 06:16 AM    MCH 26.7 08/27/2022 06:16 AM    MCHC 32.5 08/27/2022 06:16 AM    RDW 18.8 08/27/2022 06:16 AM    SEGSPCT 81 10/05/2016 12:04 PM    BANDSPCT 12.0 03/02/2012 06:30 AM    LYMPHOPCT 11.1 08/27/2022 06:16 AM    MONOPCT 8.9 08/27/2022 06:16 AM    EOSPCT 1.0 05/26/2022 12:00 AM    BASOPCT 0.7 08/27/2022 06:16 AM    MONOSABS 0.4 08/27/2022 06:16 AM    LYMPHSABS 0.6 08/27/2022 06:16 AM    EOSABS 0.2 08/27/2022 06:16 AM    BASOSABS 0.0 08/27/2022 06:16 AM    DIFFTYPE Auto-K 02/04/2013 10:11 AM     BMP:    Lab Results   Component Value Date/Time     08/27/2022 06:16 AM    K 3.5 08/27/2022 06:16 AM    K 3.8 05/28/2022 10:57 PM    CL 94 08/27/2022 06:16 AM    CO2 33 08/27/2022 06:16 AM     08/27/2022 06:16 AM    LABALBU 3.9 08/25/2022 06:38 AM    CREATININE 2.1 08/27/2022 06:16 AM    CALCIUM 10.4 08/27/2022 06:16 AM    GFRAA 38 08/27/2022 06:16 AM    GFRAA 53 06/06/2013 02:22 PM    LABGLOM 32 08/27/2022 06:16 AM    GLUCOSE 158 08/27/2022 06:16 AM       IMPRESSION/RECOMMENDATIONS:      1- A/CKD: The patient has chronic kidney disease with a baseline creatinine of 1.5 to 1.7 mg/dl. His MARGARITO is likely secondary to venous congestion in the setting of diastolic heart failure decompensation. His urinary output is well maintained   -continue IV Lasix drip at 10 mg/h and monitor.  -consider abdominal US, possible paracentesis. 2- Hypokalemia: Started on maintenance K replacement 20 meq daily, PRN potassium replacement. 3- HTN: Blood pressure improving with Midodrine. 4- Anemia: Stable hemoglobin, monitor.       Overall prognosis poor

## 2022-08-27 NOTE — PLAN OF CARE
Problem: Chronic Conditions and Co-morbidities  Goal: Patient's chronic conditions and co-morbidity symptoms are monitored and maintained or improved  Outcome: Progressing  Note:   Patient's EF (Ejection Fraction) is greater than 40%    Heart Failure Medications:  Diuretics[de-identified] Furosemide    (One of the following REQUIRED for EF </= 40%/SYSTOLIC FAILURE but MAY be used in EF% >40%/DIASTOLIC FAILURE)        ACE[de-identified] None        ARB[de-identified] None         ARNI[de-identified] None    (Beta Blockers)  NON- Evidenced Based Beta Blocker (for EF% >40%/DIASTOLIC FAILURE): None    Evidenced Based Beta Blocker::(REQUIRED for EF% <40%/SYSTOLIC FAILURE) Carvedilol- Coreg  . .................................................................................................................................................. Patient's weights and intake/output reviewed: Yes    Patient's Last Weight: 296 lbs obtained by standing scale. Difference of 1 lbs more than last documented weight. Intake/Output Summary (Last 24 hours) at 8/27/2022 1835  Last data filed at 8/27/2022 1831  Gross per 24 hour   Intake 1080 ml   Output 2950 ml   Net -1870 ml       Comorbidities Reviewed Yes    Patient has a past medical history of Anemia, Angina, Arthritis, CAD (coronary artery disease), CHF (congestive heart failure) ([facility] [address] CKD (chronic kidney disease) stage 3, GFR 30-59 ml/min ([facility]), Clostridium difficile diarrhea, Diabetes mellitus ([facility] [address] Diabetic neuropathy ([facility] [address] Disease of blood and blood forming organ, Dizziness, GERD (gastroesophageal reflux disease), Headache, Hearing loss, Hyperlipidemia, Hypertension, Kidney stone, Refusal of blood transfusions as patient is Christian, Sleep apnea, Tinnitus, Venous ulcer ([address] and Wound, open.      >>For CHF and Comorbidity documentation on Education Time and Topics, please see Education Tab    Progressive Mobility Assessment:  What is this patient's Current Level of Mobility?: Ambulatory- with Assistance  How was this patient Mobilized today?: Edge of Bed, Up to Chair,  Up to Toilet/Shower, and Up in Room, ambulated 20 ft                 With Whom? Nurse, PCA, and Self                 Level of Difficulty/Assistance: 1x Assist     Pt up in chair at this time on  4 L O2. Pt with complaints of shortness of breath. Pt with pitting lower extremity edema. Patient and/or Family's stated Goal of Care this Admission: reduce shortness of breath, increase activity tolerance, better understand heart failure and disease management, be more comfortable, and reduce lower extremity edema prior to discharge        :      Problem: Safety - Adult  Goal: Free from fall injury  Outcome: Progressing  Note: Pt will remain free from falls throughout hospital stay. Fall precautions in place, bed alarm on, bed in lowest position with wheels locked and side rails 2/4 up. Room door open and hourly rounding completed. Will continue to monitor throughout shift. Problem: Skin/Tissue Integrity  Goal: Absence of new skin breakdown  Description: 1. Monitor for areas of redness and/or skin breakdown  2. Assess vascular access sites hourly  3. Every 4-6 hours minimum:  Change oxygen saturation probe site  4. Every 4-6 hours:  If on nasal continuous positive airway pressure, respiratory therapy assess nares and determine need for appliance change or resting period. Outcome: Progressing  Note: Pt is at risk for skin breakdown. Pt will have skin assessments every shift, Q2 turns, heels elevated off of the bed, and friction and shear prevented when possible. Will continue to monitor for signs of skin breakdown and enforce prevention measures. Problem: Pain  Goal: Verbalizes/displays adequate comfort level or baseline comfort level  Outcome: Progressing  Note: Pt will be satisfied with pain control. Pt uses numeric pain rating scale with reassessments after pain med administration. Will continue to monitor progression throughout shift. Problem: Discharge Planning  Goal: Discharge to home or other facility with appropriate resources  Outcome: Progressing  Note: Pt will have accuchecks before meals and at bedtime with sliding scale insulin in place for coverage. Will continue to monitor for signs and symptoms of hypoglycemia and hyperglycemia throughout shift.

## 2022-08-28 PROBLEM — N17.9 AKI (ACUTE KIDNEY INJURY) (HCC): Status: ACTIVE | Noted: 2022-08-28

## 2022-08-28 LAB
ANION GAP SERPL CALCULATED.3IONS-SCNC: 8 MMOL/L (ref 3–16)
BUN BLDV-MCNC: 110 MG/DL (ref 7–20)
CALCIUM SERPL-MCNC: 9.9 MG/DL (ref 8.3–10.6)
CHLORIDE BLD-SCNC: 92 MMOL/L (ref 99–110)
CO2: 38 MMOL/L (ref 21–32)
CREAT SERPL-MCNC: 1.8 MG/DL (ref 0.8–1.3)
GFR AFRICAN AMERICAN: 46
GFR NON-AFRICAN AMERICAN: 38
GLUCOSE BLD-MCNC: 129 MG/DL (ref 70–99)
GLUCOSE BLD-MCNC: 131 MG/DL (ref 70–99)
GLUCOSE BLD-MCNC: 136 MG/DL (ref 70–99)
GLUCOSE BLD-MCNC: 181 MG/DL (ref 70–99)
GLUCOSE BLD-MCNC: 205 MG/DL (ref 70–99)
GLUCOSE BLD-MCNC: 210 MG/DL (ref 70–99)
MAGNESIUM: 2.3 MG/DL (ref 1.8–2.4)
PERFORMED ON: ABNORMAL
POTASSIUM SERPL-SCNC: 3.4 MMOL/L (ref 3.5–5.1)
SODIUM BLD-SCNC: 138 MMOL/L (ref 136–145)

## 2022-08-28 PROCEDURE — 36415 COLL VENOUS BLD VENIPUNCTURE: CPT

## 2022-08-28 PROCEDURE — 2060000000 HC ICU INTERMEDIATE R&B

## 2022-08-28 PROCEDURE — 94761 N-INVAS EAR/PLS OXIMETRY MLT: CPT

## 2022-08-28 PROCEDURE — 6360000002 HC RX W HCPCS: Performed by: INTERNAL MEDICINE

## 2022-08-28 PROCEDURE — 2580000003 HC RX 258: Performed by: INTERNAL MEDICINE

## 2022-08-28 PROCEDURE — 83735 ASSAY OF MAGNESIUM: CPT

## 2022-08-28 PROCEDURE — 6360000002 HC RX W HCPCS

## 2022-08-28 PROCEDURE — 2700000000 HC OXYGEN THERAPY PER DAY

## 2022-08-28 PROCEDURE — 6370000000 HC RX 637 (ALT 250 FOR IP): Performed by: INTERNAL MEDICINE

## 2022-08-28 PROCEDURE — 6370000000 HC RX 637 (ALT 250 FOR IP)

## 2022-08-28 PROCEDURE — 80048 BASIC METABOLIC PNL TOTAL CA: CPT

## 2022-08-28 RX ORDER — OXYCODONE HYDROCHLORIDE AND ACETAMINOPHEN 5; 325 MG/1; MG/1
1 TABLET ORAL EVERY 4 HOURS PRN
Status: DISCONTINUED | OUTPATIENT
Start: 2022-08-28 | End: 2022-09-01 | Stop reason: HOSPADM

## 2022-08-28 RX ORDER — POTASSIUM CHLORIDE 20 MEQ/1
20 TABLET, EXTENDED RELEASE ORAL ONCE
Status: COMPLETED | OUTPATIENT
Start: 2022-08-28 | End: 2022-08-28

## 2022-08-28 RX ORDER — SENNA PLUS 8.6 MG/1
2 TABLET ORAL NIGHTLY
Status: DISCONTINUED | OUTPATIENT
Start: 2022-08-28 | End: 2022-09-01 | Stop reason: HOSPADM

## 2022-08-28 RX ADMIN — ATORVASTATIN CALCIUM 80 MG: 80 TABLET, FILM COATED ORAL at 21:08

## 2022-08-28 RX ADMIN — FLUTICASONE PROPIONATE 1 SPRAY: 50 SPRAY, METERED NASAL at 10:19

## 2022-08-28 RX ADMIN — MIDODRINE HYDROCHLORIDE 5 MG: 5 TABLET ORAL at 17:09

## 2022-08-28 RX ADMIN — FUROSEMIDE 10 MG/HR: 10 INJECTION, SOLUTION INTRAMUSCULAR; INTRAVENOUS at 14:06

## 2022-08-28 RX ADMIN — ENOXAPARIN SODIUM 30 MG: 100 INJECTION SUBCUTANEOUS at 21:07

## 2022-08-28 RX ADMIN — INSULIN LISPRO 4 UNITS: 100 INJECTION, SOLUTION INTRAVENOUS; SUBCUTANEOUS at 12:55

## 2022-08-28 RX ADMIN — FERROUS SULFATE TAB 325 MG (65 MG ELEMENTAL FE) 325 MG: 325 (65 FE) TAB at 09:25

## 2022-08-28 RX ADMIN — OXYCODONE AND ACETAMINOPHEN 1 TABLET: 325; 5 TABLET ORAL at 21:07

## 2022-08-28 RX ADMIN — INSULIN GLARGINE 50 UNITS: 100 INJECTION, SOLUTION SUBCUTANEOUS at 21:08

## 2022-08-28 RX ADMIN — POLYETHYLENE GLYCOL 3350 17 G: 17 POWDER, FOR SOLUTION ORAL at 21:07

## 2022-08-28 RX ADMIN — ENOXAPARIN SODIUM 30 MG: 100 INJECTION SUBCUTANEOUS at 09:25

## 2022-08-28 RX ADMIN — CARVEDILOL 3.12 MG: 3.12 TABLET, FILM COATED ORAL at 09:25

## 2022-08-28 RX ADMIN — ALLOPURINOL 300 MG: 300 TABLET ORAL at 09:25

## 2022-08-28 RX ADMIN — POLYETHYLENE GLYCOL 3350 17 G: 17 POWDER, FOR SOLUTION ORAL at 09:24

## 2022-08-28 RX ADMIN — MIDODRINE HYDROCHLORIDE 5 MG: 5 TABLET ORAL at 09:25

## 2022-08-28 RX ADMIN — INSULIN LISPRO 5 UNITS: 100 INJECTION, SOLUTION INTRAVENOUS; SUBCUTANEOUS at 12:56

## 2022-08-28 RX ADMIN — Medication 400 MG: at 09:25

## 2022-08-28 RX ADMIN — SENNOSIDES 17.2 MG: 8.6 TABLET, COATED ORAL at 21:07

## 2022-08-28 RX ADMIN — CYCLOBENZAPRINE 5 MG: 10 TABLET, FILM COATED ORAL at 21:08

## 2022-08-28 RX ADMIN — NALOXEGOL OXALATE 12.5 MG: 12.5 TABLET, FILM COATED ORAL at 14:46

## 2022-08-28 RX ADMIN — OXYCODONE AND ACETAMINOPHEN 1 TABLET: 325; 5 TABLET ORAL at 05:42

## 2022-08-28 RX ADMIN — FERROUS SULFATE TAB 325 MG (65 MG ELEMENTAL FE) 325 MG: 325 (65 FE) TAB at 21:07

## 2022-08-28 RX ADMIN — TAMSULOSIN HYDROCHLORIDE 0.4 MG: 0.4 CAPSULE ORAL at 05:42

## 2022-08-28 RX ADMIN — INSULIN LISPRO 5 UNITS: 100 INJECTION, SOLUTION INTRAVENOUS; SUBCUTANEOUS at 09:25

## 2022-08-28 RX ADMIN — PANTOPRAZOLE SODIUM 40 MG: 40 TABLET, DELAYED RELEASE ORAL at 05:42

## 2022-08-28 RX ADMIN — POTASSIUM CHLORIDE 20 MEQ: 1500 TABLET, EXTENDED RELEASE ORAL at 14:45

## 2022-08-28 RX ADMIN — MIDODRINE HYDROCHLORIDE 5 MG: 5 TABLET ORAL at 14:46

## 2022-08-28 RX ADMIN — OXYCODONE AND ACETAMINOPHEN 1 TABLET: 325; 5 TABLET ORAL at 12:51

## 2022-08-28 RX ADMIN — POTASSIUM CHLORIDE 20 MEQ: 1500 TABLET, EXTENDED RELEASE ORAL at 09:25

## 2022-08-28 RX ADMIN — Medication 400 MG: at 21:07

## 2022-08-28 RX ADMIN — OXYCODONE AND ACETAMINOPHEN 1 TABLET: 325; 5 TABLET ORAL at 17:09

## 2022-08-28 RX ADMIN — PREGABALIN 25 MG: 25 CAPSULE ORAL at 21:07

## 2022-08-28 RX ADMIN — INSULIN LISPRO 5 UNITS: 100 INJECTION, SOLUTION INTRAVENOUS; SUBCUTANEOUS at 18:20

## 2022-08-28 RX ADMIN — CYCLOBENZAPRINE 5 MG: 10 TABLET, FILM COATED ORAL at 09:25

## 2022-08-28 ASSESSMENT — PAIN SCALES - GENERAL
PAINLEVEL_OUTOF10: 6
PAINLEVEL_OUTOF10: 3
PAINLEVEL_OUTOF10: 9
PAINLEVEL_OUTOF10: 7
PAINLEVEL_OUTOF10: 0
PAINLEVEL_OUTOF10: 3
PAINLEVEL_OUTOF10: 0
PAINLEVEL_OUTOF10: 0

## 2022-08-28 ASSESSMENT — PAIN DESCRIPTION - LOCATION
LOCATION: GENERALIZED
LOCATION: NECK
LOCATION: GENERALIZED

## 2022-08-28 NOTE — PLAN OF CARE
Problem: Chronic Conditions and Co-morbidities  Goal: Patient's chronic conditions and co-morbidity symptoms are monitored and maintained or improved  Outcome: Progressing  Note:   Patient's EF (Ejection Fraction) is greater than 40%    Heart Failure Medications:  Diuretics[de-identified] Furosemide    (One of the following REQUIRED for EF </= 40%/SYSTOLIC FAILURE but MAY be used in EF% >40%/DIASTOLIC FAILURE)        ACE[de-identified] None        ARB[de-identified] None         ARNI[de-identified] None    (Beta Blockers)  NON- Evidenced Based Beta Blocker (for EF% >40%/DIASTOLIC FAILURE): None    Evidenced Based Beta Blocker::(REQUIRED for EF% <40%/SYSTOLIC FAILURE) Carvedilol- Coreg  . .................................................................................................................................................. Patient's weights and intake/output reviewed: Yes    Patient's Last Weight: 295 lbs obtained by standing scale. Difference of 1 lbs less than last documented weight. Intake/Output Summary (Last 24 hours) at 8/28/2022 0840  Last data filed at 8/28/2022 4934  Gross per 24 hour   Intake 840 ml   Output 3250 ml   Net -2410 ml       Comorbidities Reviewed Yes    Patient has a past medical history of Anemia, Angina, Arthritis, CAD (coronary artery disease), CHF (congestive heart failure) ([facility] [address] CKD (chronic kidney disease) stage 3, GFR 30-59 ml/min ([facility]), Clostridium difficile diarrhea, Diabetes mellitus ([facility] [address] Diabetic neuropathy ([facility] [address] Disease of blood and blood forming organ, Dizziness, GERD (gastroesophageal reflux disease), Headache, Hearing loss, Hyperlipidemia, Hypertension, Kidney stone, Refusal of blood transfusions as patient is Alev[place], Sleep apnea, Tinnitus, Venous ulcer ([address] and Wound, open.      >>For CHF and Comorbidity documentation on Education Time and Topics, please see Education Tab    Progressive Mobility Assessment:  What is this patient's Current Level of Mobility?: Ambulatory- with Assistance  How was this patient Mobilized today?: Edge of Bed, Up to Chair,  Up to Toilet/Shower, and Up in Room, ambulated 20 ft                 With Whom? Nurse, PCA, and Self                 Level of Difficulty/Assistance: 1x Assist     Pt up in chair at this time on  4 L O2. Pt with complaints of shortness of breath. Pt with pitting lower extremity edema. Patient and/or Family's stated Goal of Care this Admission: reduce shortness of breath, increase activity tolerance, better understand heart failure and disease management, be more comfortable, and reduce lower extremity edema prior to discharge        :      Problem: Safety - Adult  Goal: Free from fall injury  Outcome: Progressing  Note: Pt will remain free from falls throughout hospital stay. Fall precautions in place, bed alarm on, bed in lowest position with wheels locked and side rails 2/4 up. Room door open and hourly rounding completed. Will continue to monitor throughout shift. Problem: Pain  Goal: Verbalizes/displays adequate comfort level or baseline comfort level  Outcome: Progressing  Note: Pt will be satisfied with pain control. Pt uses numeric pain rating scale with reassessments after pain med administration. Will continue to monitor progression throughout shift. Problem: Discharge Planning  Goal: Discharge to home or other facility with appropriate resources  Outcome: Progressing  Note: Pt will have accuchecks before meals and at bedtime with sliding scale insulin in place for coverage. Will continue to monitor for signs and symptoms of hypoglycemia and hyperglycemia throughout shift.

## 2022-08-28 NOTE — PROGRESS NOTES
Hospitalist Progress Note      PCP: No primary care provider on file. Date of Admission: 8/24/2022    Chief Complaint: WEIGHT GAIN, SHORTNESS OF [facility] Course: 75 y.o. male with past medical history significant for CHF, CAD, CKD, DM2 with peripheral neuropathy, hypertension, hyperlipidemia, morbid obesity, constipation, chronic pain, CAD s/p CABG. He was in the ER 8/18 for similar complaints and left AMA due to wait time (he sated 32 hours). He went to his cardiologist appointment today for worsening BLE edema, dyspnea, and fluid in his abdomen. He was direct admitted from there in order to by-pass the ED. He reports a 20lb weight gain. He denies denies chest pain, n/v/d, dysuria, fever, chills, and headache; reports neuropathy, and constipation. There is no radiation of symptoms. No aggravating or alleviating factors. Subjective: denies chest pain, n/v/d, dysuria, fever, chills, headache. Reports dyspnea, edema.  Reports BM yesterday      Medications:  Reviewed    Infusion Medications    sodium chloride      furosemide (LASIX) 1mg/ml infusion 10 mg/hr (08/27/22 1216)    dextrose       Scheduled Medications    potassium chloride  20 mEq Oral Once    senna  2 tablet Oral Nightly    polyethylene glycol  17 g Oral BID    magnesium oxide  400 mg Oral BID    potassium chloride  20 mEq Oral Daily    insulin glargine  50 Units SubCUTAneous Nightly    insulin lispro  0-16 Units SubCUTAneous TID WC    insulin lispro  0-4 Units SubCUTAneous Nightly    insulin lispro  5 Units SubCUTAneous TID WC    sodium chloride flush  5-40 mL IntraVENous 2 times per day    enoxaparin  30 mg SubCUTAneous BID    allopurinol  300 mg Oral Daily    atorvastatin  80 mg Oral Nightly    carvedilol  3.125 mg Oral BID WC    ferrous sulfate  325 mg Oral BID    pantoprazole  40 mg Oral QAM AC    pregabalin  25 mg Oral BID    tamsulosin  0.4 mg Oral Daily    fluticasone  1 spray Each Nostril Daily    midodrine  5 mg Oral TID WC     PRN Meds: oxyCODONE-acetaminophen, sodium chloride, cyclobenzaprine, sodium chloride flush, sodium chloride, polyethylene glycol, acetaminophen **OR** acetaminophen, glucose, dextrose bolus **OR** dextrose bolus, glucagon (rDNA), dextrose, prochlorperazine      Intake/Output Summary (Last 24 hours) at 8/28/2022 1405  Last data filed at 8/28/2022 7829  Gross per 24 hour   Intake 720 ml   Output 2050 ml   Net -1330 ml       Physical Exam Performed:    /66   Pulse 91   Temp 98.1 °F (36.7 °C) (Oral)   Resp 19   Ht 5' 9\" (1.753 m)   Wt 295 lb (133.8 kg)   SpO2 95%   BMI 43.56 kg/m²     General appearance: Sitting in chair, feet elevated. Appears comfortable. No apparent distress, appears stated age and cooperative. HEENT: Pupils equal, round, and reactive to light. Conjunctivae/corneas clear. Neck: Supple, with full range of motion. No jugular venous distention. Trachea midline. Respiratory: Normal respiratory effort. Clear to auscultation, bilaterally without Rales/Wheezes/Rhonchi. 4L per NC  Cardiovascular: Regular rate and rhythm with normal S1/S2 without murmurs, rubs or gallops. Abdomen: Soft, non-tender, non-distended with normal bowel sounds. Ascites present  Musculoskeletal: No clubbing, cyanosis bilaterally. BLE 3+ edema, erythematous. ACE wraps removed at time of exam. Full range of motion without deformity. Skin: Skin color, texture, turgor normal. No rashes or lesions. Neurologic: Neurovascularly intact without any focal sensory/motor deficits.  Cranial nerves: II-XII intact, grossly non-focal.  Psychiatric: Alert and oriented, thought content appropriate, normal insight  Capillary Refill: Brisk,3 seconds, normal   Peripheral Pulses: +2 palpable, equal bilaterally       Labs:   Recent Labs     08/26/22  0709 08/27/22  0616   WBC 5.1 5.0   HGB 10.4* 9.7*   HCT 32.4* 29.8*    147     Recent Labs     08/26/22  0709 08/27/22  0616 08/28/22  0600   * 138 138   K 3.5 3.5 3.4* CL 90* 94* 92*   CO2 34* 33* 38*   * 122* 110*   CREATININE 1.9* 2.1* 1.8*   CALCIUM 9.7 10.4 9.9     No results for input(s): AST, ALT, BILIDIR, BILITOT, ALKPHOS in the last 72 hours. No results for input(s): INR in the last 72 hours. No results for input(s): Adonica Hoose in the last 72 hours. Urinalysis:      Lab Results   Component Value Date/Time    NITRU Negative 05/01/2022 09:53 PM    WBCUA 0-2 11/05/2013 10:31 AM    BACTERIA Rare 11/05/2013 10:31 AM    RBCUA 0-2 11/05/2013 10:31 AM    BLOODU Negative 05/01/2022 09:53 PM    SPECGRAV 1.010 05/01/2022 09:53 PM    GLUCOSEU Negative 05/01/2022 09:53 PM    GLUCOSEU NEGATIVE 02/29/2012 03:17 PM       Radiology:  CT ABDOMEN PELVIS WO CONTRAST Additional Contrast? None   Final Result   1. Hepatosplenomegaly with large amount of ascites. 2. Sigmoid diverticulosis. 3. Cardiomegaly with pulmonary edema in the lung bases consistent with   congestive heart failure. 4. 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 5. Anasarca. 6. Gynecomastia. XR CHEST PORTABLE   Final Result   Worsening compared to the previous evaluation from August 18, 2022 with more   pronounced pulmonary edema related to fluid overload/CHF. Small right   effusion. Right lower lobe atelectasis/consolidation. IR US GUIDED PARACENTESIS    (Results Pending)       Assessment/Plan:    Active Hospital Problems    Diagnosis     Fluid overload [E87.70]      Priority: Medium    Obesity, Class III, BMI 40-49.9 (morbid obesity) ([facility]) [E66.01]     Chronic diastolic heart failure ([facility]) [I50.32]     CKD (chronic kidney disease) stage 3, GFR 30-59 ml/min ([facility]) [N18.30]     Essential hypertension [I10]      Acute on chronic diastolic heart failure  -Echo: EF 50-55%; G3 DD; flattened interventricular septum, c/w R sided overload; severe pHTN  -Daily weights.  On admission 137.1kg, down to 133.8kg  -Strict I&O  -No ACE d/t renal disease  -Continue discussions as needed  -No need to consult palliative at this time     Morbid obesity  -Body mass index is 14.54 kg/m².   -Complicating assessment and treatment. Placing patient at risk for multiple co-morbidities as well as early death and contributing to the patient's presentation  -Counseled on weight loss. DVT Prophylaxis: lovenox  Diet: ADULT DIET; Regular; 4 carb choices (60 gm/meal);  Low Sodium (2 gm); 1500 ml  Diet NPO  Code Status: Limited    PT/OT Eval Status: not indicated    Dispo - >2 days    PATRICK Scott - CNP

## 2022-08-28 NOTE — PROGRESS NOTES
Department of Internal Medicine  Nephrology Progress Note        SUBJECTIVE:    We are following this patient for A/CKD-4  Renal function remains poor. Last 24h uop 2.7 liters with lasix drip. Wt 295 lbs ( 302 on admission)    ROS:   Intake adequate, +weak. Physical Exam:    VITALS:  /63   Pulse 88   Temp 97.9 °F (36.6 °C) (Oral)   Resp 18   Ht 5' 9\" (1.753 m)   Wt 295 lb (133.8 kg)   SpO2 99%   BMI 43.56 kg/m²   General appearance: Seems comfortable, no acute distress. Neck: Trachea midline, thyroid normal.   Lungs:  Non labored breathing, CTA to anterior auscultation. Heart:  S1S2 normal, rub or gallop. ++ peripheral edema. Abdomen: Soft, non-tender, no organomegaly, +distended. Skin: No lesions or rashes, warm to touch.      DATA:    CBC with Differential:    Lab Results   Component Value Date/Time    WBC 5.0 08/27/2022 06:16 AM    RBC 3.63 08/27/2022 06:16 AM    HGB 9.7 08/27/2022 06:16 AM    HCT 29.8 08/27/2022 06:16 AM     08/27/2022 06:16 AM    MCV 82.2 08/27/2022 06:16 AM    MCH 26.7 08/27/2022 06:16 AM    MCHC 32.5 08/27/2022 06:16 AM    RDW 18.8 08/27/2022 06:16 AM    SEGSPCT 81 10/05/2016 12:04 PM    BANDSPCT 12.0 03/02/2012 06:30 AM    LYMPHOPCT 11.1 08/27/2022 06:16 AM    MONOPCT 8.9 08/27/2022 06:16 AM    EOSPCT 1.0 05/26/2022 12:00 AM    BASOPCT 0.7 08/27/2022 06:16 AM    MONOSABS 0.4 08/27/2022 06:16 AM    LYMPHSABS 0.6 08/27/2022 06:16 AM    EOSABS 0.2 08/27/2022 06:16 AM    BASOSABS 0.0 08/27/2022 06:16 AM    DIFFTYPE Auto-K 02/04/2013 10:11 AM     BMP:    Lab Results   Component Value Date/Time     08/27/2022 06:16 AM    K 3.5 08/27/2022 06:16 AM    K 3.8 05/28/2022 10:57 PM    CL 94 08/27/2022 06:16 AM    CO2 33 08/27/2022 06:16 AM     08/27/2022 06:16 AM    LABALBU 3.9 08/25/2022 06:38 AM    CREATININE 2.1 08/27/2022 06:16 AM    CALCIUM 10.4 08/27/2022 06:16 AM    GFRAA 38 08/27/2022 06:16 AM    GFRAA 53 06/06/2013 02:22 PM    LABGLOM 32 08/27/2022 06:16 AM    GLUCOSE 158 08/27/2022 06:16 AM       IMPRESSION/RECOMMENDATIONS:      1- A/CKD: The patient has chronic kidney disease with a baseline creatinine of 1.5 to 1.7 mg/dl. His MARGARITO is likely secondary to venous congestion in the setting of diastolic heart failure decompensation. His urinary output is well maintained   -continue IV Lasix drip at 10 mg/h and monitor.  -consider abdominal US, possible paracentesis per Admitting team.    2- Hypokalemia: Started on maintenance K replacement 20 meq daily, PRN potassium replacement. 3- HTN: Blood pressure improving with Midodrine. 4- Anemia: Stable hemoglobin, monitor.       Overall prognosis poor

## 2022-08-29 ENCOUNTER — APPOINTMENT (OUTPATIENT)
Dept: ULTRASOUND IMAGING | Age: 67
DRG: 291 | End: 2022-08-29
Attending: INTERNAL MEDICINE
Payer: MEDICARE

## 2022-08-29 LAB
ALBUMIN FLUID: 2.5 G/DL
AMYLASE FLUID: 31 U/L
ANION GAP SERPL CALCULATED.3IONS-SCNC: 12 MMOL/L (ref 3–16)
APPEARANCE FLUID: NORMAL
BASOPHILS ABSOLUTE: 0 K/UL (ref 0–0.2)
BASOPHILS RELATIVE PERCENT: 0.9 %
BUN BLDV-MCNC: 104 MG/DL (ref 7–20)
CALCIUM SERPL-MCNC: 9.8 MG/DL (ref 8.3–10.6)
CELL COUNT FLUID TYPE: NORMAL
CHLORIDE BLD-SCNC: 92 MMOL/L (ref 99–110)
CLOT EVALUATION: NORMAL
CO2: 35 MMOL/L (ref 21–32)
COLOR FLUID: YELLOW
CREAT SERPL-MCNC: 1.7 MG/DL (ref 0.8–1.3)
EOSINOPHILS ABSOLUTE: 0.1 K/UL (ref 0–0.6)
EOSINOPHILS RELATIVE PERCENT: 3.2 %
FLUID TYPE: NORMAL
GFR AFRICAN AMERICAN: 49
GFR NON-AFRICAN AMERICAN: 40
GLUCOSE BLD-MCNC: 110 MG/DL (ref 70–99)
GLUCOSE BLD-MCNC: 136 MG/DL (ref 70–99)
GLUCOSE BLD-MCNC: 179 MG/DL (ref 70–99)
GLUCOSE BLD-MCNC: 180 MG/DL (ref 70–99)
GLUCOSE BLD-MCNC: 207 MG/DL (ref 70–99)
GLUCOSE BLD-MCNC: 213 MG/DL (ref 70–99)
GLUCOSE, FLUID: 149 MG/DL
HCT VFR BLD CALC: 33.1 % (ref 40.5–52.5)
HEMOGLOBIN: 10.6 G/DL (ref 13.5–17.5)
INR BLD: 1.31 (ref 0.87–1.14)
INR BLD: 1.4 (ref 0.87–1.14)
LD, FLUID: 130 U/L
LYMPHOCYTES ABSOLUTE: 0.5 K/UL (ref 1–5.1)
LYMPHOCYTES RELATIVE PERCENT: 11.4 %
LYMPHOCYTES, BODY FLUID: 25 %
MACROPHAGE FLUID: 56 %
MAGNESIUM: 2.3 MG/DL (ref 1.8–2.4)
MCH RBC QN AUTO: 26.5 PG (ref 26–34)
MCHC RBC AUTO-ENTMCNC: 31.9 G/DL (ref 31–36)
MCV RBC AUTO: 83.1 FL (ref 80–100)
MESOTHELIAL FLUID: 5 %
MONOCYTES ABSOLUTE: 0.3 K/UL (ref 0–1.3)
MONOCYTES RELATIVE PERCENT: 7.9 %
MONONUCLEAR UNIDENTIFIED CELLS FLUID: 2 %
NEUTROPHIL, FLUID: 12 %
NEUTROPHILS ABSOLUTE: 3.3 K/UL (ref 1.7–7.7)
NEUTROPHILS RELATIVE PERCENT: 76.6 %
NUCLEATED CELLS FLUID: 282 /CUMM
NUMBER OF CELLS COUNTED FLUID: 100
PDW BLD-RTO: 19.1 % (ref 12.4–15.4)
PERFORMED ON: ABNORMAL
PLATELET # BLD: 138 K/UL (ref 135–450)
PMV BLD AUTO: 8.9 FL (ref 5–10.5)
POTASSIUM SERPL-SCNC: 3.6 MMOL/L (ref 3.5–5.1)
PROTHROMBIN TIME: 16.1 SEC (ref 11.7–14.5)
PROTHROMBIN TIME: 17 SEC (ref 11.7–14.5)
RBC # BLD: 3.99 M/UL (ref 4.2–5.9)
RBC FLUID: 1900 /CUMM
SODIUM BLD-SCNC: 139 MMOL/L (ref 136–145)
WBC # BLD: 4.3 K/UL (ref 4–11)

## 2022-08-29 PROCEDURE — 82945 GLUCOSE OTHER FLUID: CPT

## 2022-08-29 PROCEDURE — 2580000003 HC RX 258

## 2022-08-29 PROCEDURE — 6370000000 HC RX 637 (ALT 250 FOR IP)

## 2022-08-29 PROCEDURE — 82042 OTHER SOURCE ALBUMIN QUAN EA: CPT

## 2022-08-29 PROCEDURE — 2060000000 HC ICU INTERMEDIATE R&B

## 2022-08-29 PROCEDURE — 6360000002 HC RX W HCPCS

## 2022-08-29 PROCEDURE — 2580000003 HC RX 258: Performed by: INTERNAL MEDICINE

## 2022-08-29 PROCEDURE — 88112 CYTOPATH CELL ENHANCE TECH: CPT

## 2022-08-29 PROCEDURE — 49083 ABD PARACENTESIS W/IMAGING: CPT

## 2022-08-29 PROCEDURE — 89051 BODY FLUID CELL COUNT: CPT

## 2022-08-29 PROCEDURE — 6360000002 HC RX W HCPCS: Performed by: INTERNAL MEDICINE

## 2022-08-29 PROCEDURE — 83735 ASSAY OF MAGNESIUM: CPT

## 2022-08-29 PROCEDURE — 80048 BASIC METABOLIC PNL TOTAL CA: CPT

## 2022-08-29 PROCEDURE — 85025 COMPLETE CBC W/AUTO DIFF WBC: CPT

## 2022-08-29 PROCEDURE — 85610 PROTHROMBIN TIME: CPT

## 2022-08-29 PROCEDURE — 97116 GAIT TRAINING THERAPY: CPT

## 2022-08-29 PROCEDURE — P9047 ALBUMIN (HUMAN), 25%, 50ML: HCPCS | Performed by: INTERNAL MEDICINE

## 2022-08-29 PROCEDURE — 0W9G3ZZ DRAINAGE OF PERITONEAL CAVITY, PERCUTANEOUS APPROACH: ICD-10-PCS | Performed by: RADIOLOGY

## 2022-08-29 PROCEDURE — 94761 N-INVAS EAR/PLS OXIMETRY MLT: CPT

## 2022-08-29 PROCEDURE — 6370000000 HC RX 637 (ALT 250 FOR IP): Performed by: INTERNAL MEDICINE

## 2022-08-29 PROCEDURE — 88305 TISSUE EXAM BY PATHOLOGIST: CPT

## 2022-08-29 PROCEDURE — 82150 ASSAY OF AMYLASE: CPT

## 2022-08-29 PROCEDURE — 2700000000 HC OXYGEN THERAPY PER DAY

## 2022-08-29 PROCEDURE — 83615 LACTATE (LD) (LDH) ENZYME: CPT

## 2022-08-29 PROCEDURE — 97110 THERAPEUTIC EXERCISES: CPT

## 2022-08-29 PROCEDURE — 36415 COLL VENOUS BLD VENIPUNCTURE: CPT

## 2022-08-29 RX ORDER — POTASSIUM CHLORIDE 20 MEQ/1
20 TABLET, EXTENDED RELEASE ORAL ONCE
Status: COMPLETED | OUTPATIENT
Start: 2022-08-29 | End: 2022-08-29

## 2022-08-29 RX ORDER — ALBUMIN (HUMAN) 12.5 G/50ML
25 SOLUTION INTRAVENOUS EVERY 6 HOURS
Status: COMPLETED | OUTPATIENT
Start: 2022-08-29 | End: 2022-08-30

## 2022-08-29 RX ORDER — ALBUMIN (HUMAN) 12.5 G/50ML
25 SOLUTION INTRAVENOUS EVERY 6 HOURS
Status: DISCONTINUED | OUTPATIENT
Start: 2022-08-29 | End: 2022-08-29

## 2022-08-29 RX ADMIN — POTASSIUM CHLORIDE 20 MEQ: 1500 TABLET, EXTENDED RELEASE ORAL at 12:10

## 2022-08-29 RX ADMIN — INSULIN GLARGINE 50 UNITS: 100 INJECTION, SOLUTION SUBCUTANEOUS at 21:16

## 2022-08-29 RX ADMIN — OXYCODONE AND ACETAMINOPHEN 1 TABLET: 325; 5 TABLET ORAL at 09:52

## 2022-08-29 RX ADMIN — ALLOPURINOL 300 MG: 300 TABLET ORAL at 08:45

## 2022-08-29 RX ADMIN — INSULIN LISPRO 4 UNITS: 100 INJECTION, SOLUTION INTRAVENOUS; SUBCUTANEOUS at 08:52

## 2022-08-29 RX ADMIN — FERROUS SULFATE TAB 325 MG (65 MG ELEMENTAL FE) 325 MG: 325 (65 FE) TAB at 21:19

## 2022-08-29 RX ADMIN — POLYETHYLENE GLYCOL 3350 17 G: 17 POWDER, FOR SOLUTION ORAL at 08:45

## 2022-08-29 RX ADMIN — MIDODRINE HYDROCHLORIDE 5 MG: 5 TABLET ORAL at 12:10

## 2022-08-29 RX ADMIN — INSULIN LISPRO 5 UNITS: 100 INJECTION, SOLUTION INTRAVENOUS; SUBCUTANEOUS at 18:37

## 2022-08-29 RX ADMIN — CARVEDILOL 3.12 MG: 3.12 TABLET, FILM COATED ORAL at 08:45

## 2022-08-29 RX ADMIN — FERROUS SULFATE TAB 325 MG (65 MG ELEMENTAL FE) 325 MG: 325 (65 FE) TAB at 08:45

## 2022-08-29 RX ADMIN — ALBUMIN (HUMAN) 25 G: 0.25 INJECTION, SOLUTION INTRAVENOUS at 17:52

## 2022-08-29 RX ADMIN — ENOXAPARIN SODIUM 30 MG: 100 INJECTION SUBCUTANEOUS at 21:17

## 2022-08-29 RX ADMIN — MIDODRINE HYDROCHLORIDE 5 MG: 5 TABLET ORAL at 17:27

## 2022-08-29 RX ADMIN — FLUTICASONE PROPIONATE 1 SPRAY: 50 SPRAY, METERED NASAL at 08:46

## 2022-08-29 RX ADMIN — CYCLOBENZAPRINE 5 MG: 10 TABLET, FILM COATED ORAL at 23:35

## 2022-08-29 RX ADMIN — INSULIN LISPRO 5 UNITS: 100 INJECTION, SOLUTION INTRAVENOUS; SUBCUTANEOUS at 08:53

## 2022-08-29 RX ADMIN — POLYETHYLENE GLYCOL 3350 17 G: 17 POWDER, FOR SOLUTION ORAL at 21:19

## 2022-08-29 RX ADMIN — PREGABALIN 25 MG: 25 CAPSULE ORAL at 21:19

## 2022-08-29 RX ADMIN — MIDODRINE HYDROCHLORIDE 5 MG: 5 TABLET ORAL at 08:45

## 2022-08-29 RX ADMIN — ALBUMIN (HUMAN) 25 G: 0.25 INJECTION, SOLUTION INTRAVENOUS at 23:25

## 2022-08-29 RX ADMIN — SODIUM CHLORIDE, PRESERVATIVE FREE 10 ML: 5 INJECTION INTRAVENOUS at 21:17

## 2022-08-29 RX ADMIN — OXYCODONE AND ACETAMINOPHEN 1 TABLET: 325; 5 TABLET ORAL at 21:19

## 2022-08-29 RX ADMIN — TAMSULOSIN HYDROCHLORIDE 0.4 MG: 0.4 CAPSULE ORAL at 05:34

## 2022-08-29 RX ADMIN — OXYCODONE AND ACETAMINOPHEN 1 TABLET: 325; 5 TABLET ORAL at 16:14

## 2022-08-29 RX ADMIN — CARVEDILOL 3.12 MG: 3.12 TABLET, FILM COATED ORAL at 17:27

## 2022-08-29 RX ADMIN — PANTOPRAZOLE SODIUM 40 MG: 40 TABLET, DELAYED RELEASE ORAL at 05:34

## 2022-08-29 RX ADMIN — POTASSIUM CHLORIDE 20 MEQ: 1500 TABLET, EXTENDED RELEASE ORAL at 08:45

## 2022-08-29 RX ADMIN — OXYCODONE AND ACETAMINOPHEN 1 TABLET: 325; 5 TABLET ORAL at 01:28

## 2022-08-29 RX ADMIN — Medication 400 MG: at 21:19

## 2022-08-29 RX ADMIN — NALOXEGOL OXALATE 12.5 MG: 12.5 TABLET, FILM COATED ORAL at 08:49

## 2022-08-29 RX ADMIN — ATORVASTATIN CALCIUM 80 MG: 80 TABLET, FILM COATED ORAL at 21:19

## 2022-08-29 RX ADMIN — ENOXAPARIN SODIUM 30 MG: 100 INJECTION SUBCUTANEOUS at 08:45

## 2022-08-29 RX ADMIN — OXYCODONE AND ACETAMINOPHEN 1 TABLET: 325; 5 TABLET ORAL at 05:34

## 2022-08-29 RX ADMIN — FUROSEMIDE 10 MG/HR: 10 INJECTION, SOLUTION INTRAMUSCULAR; INTRAVENOUS at 12:10

## 2022-08-29 RX ADMIN — Medication 400 MG: at 08:45

## 2022-08-29 RX ADMIN — SENNOSIDES 17.2 MG: 8.6 TABLET, COATED ORAL at 21:18

## 2022-08-29 ASSESSMENT — PAIN DESCRIPTION - ONSET
ONSET: ON-GOING
ONSET: ON-GOING

## 2022-08-29 ASSESSMENT — PAIN DESCRIPTION - LOCATION
LOCATION: ABDOMEN
LOCATION: GENERALIZED
LOCATION: BACK
LOCATION: ABDOMEN
LOCATION: GENERALIZED
LOCATION: NECK;SHOULDER

## 2022-08-29 ASSESSMENT — PAIN SCALES - GENERAL
PAINLEVEL_OUTOF10: 6
PAINLEVEL_OUTOF10: 6
PAINLEVEL_OUTOF10: 8
PAINLEVEL_OUTOF10: 6
PAINLEVEL_OUTOF10: 3
PAINLEVEL_OUTOF10: 0
PAINLEVEL_OUTOF10: 6

## 2022-08-29 ASSESSMENT — PAIN DESCRIPTION - DESCRIPTORS
DESCRIPTORS: ACHING
DESCRIPTORS: CRAMPING
DESCRIPTORS: CRAMPING
DESCRIPTORS: ACHING;DISCOMFORT

## 2022-08-29 ASSESSMENT — PAIN DESCRIPTION - FREQUENCY
FREQUENCY: CONTINUOUS
FREQUENCY: CONTINUOUS

## 2022-08-29 ASSESSMENT — PAIN - FUNCTIONAL ASSESSMENT
PAIN_FUNCTIONAL_ASSESSMENT: ACTIVITIES ARE NOT PREVENTED

## 2022-08-29 ASSESSMENT — PAIN DESCRIPTION - ORIENTATION
ORIENTATION: RIGHT;LEFT
ORIENTATION: LEFT;RIGHT
ORIENTATION: RIGHT;LEFT

## 2022-08-29 ASSESSMENT — PAIN DESCRIPTION - PAIN TYPE
TYPE: ACUTE PAIN
TYPE: ACUTE PAIN

## 2022-08-29 ASSESSMENT — PAIN SCALES - WONG BAKER: WONGBAKER_NUMERICALRESPONSE: 0

## 2022-08-29 NOTE — PLAN OF CARE
Problem: Pain  Goal: Verbalizes/displays adequate comfort level or baseline comfort level  Outcome: Progressing  Note: Pt will be satisfied with pain control. Pt uses numeric pain rating scale with reassessments after pain med administration. Will continue to monitor progression throughout shift. Problem: Safety - Adult  Goal: Free from fall injury  Outcome: Progressing  Note: Pt high fall risk. Instructed to use call light before getting out of bed. Call light within reach. Bed in low position. Bed alarm on. Will continue to monitor. Problem: Chronic Conditions and Co-morbidities  Goal: Patient's chronic conditions and co-morbidity symptoms are monitored and maintained or improved  Outcome: Progressing  Note: Pt will have accuchecks before meals and at bedtime with sliding scale insulin in place for coverage.  Will continue to monitor for signs and symptoms of hypoglycemia and hyperglycemia throughout shift.        :

## 2022-08-29 NOTE — PROGRESS NOTES
Hospitalist Progress Note      PCP: No primary care provider on file. Date of Admission: 8/24/2022    Chief Complaint:   Weight gain, shortness of breath    Hospital Course:   75 y.o. male with past medical history significant for CHF, CAD, CKD, DM2 with peripheral neuropathy, hypertension, hyperlipidemia, morbid obesity, constipation, chronic pain, CAD s/p CABG. He was in the ER 8/18 for similar complaints and left AMA due to wait time (he sated 32 hours). He went to his cardiologist appointment today for worsening BLE edema, dyspnea, and fluid in his abdomen. He was direct admitted from there in order to by-pass the ED. He reports a 20lb weight gain. He denies denies chest pain, n/v/d, dysuria, fever, chills, and headache; reports neuropathy, and constipation. There is no radiation of symptoms. No aggravating or alleviating factors. Subjective:   Seen resting in bed. No new complaints. Vital signs stable. Afebrile.       Medications:  Reviewed    Infusion Medications    sodium chloride      [Held by provider] furosemide (LASIX) 1mg/ml infusion Stopped (08/29/22 1721)    dextrose       Scheduled Medications    albumin human  25 g IntraVENous Q6H    senna  2 tablet Oral Nightly    naloxegol  12.5 mg Oral QAM AC    polyethylene glycol  17 g Oral BID    magnesium oxide  400 mg Oral BID    potassium chloride  20 mEq Oral Daily    insulin glargine  50 Units SubCUTAneous Nightly    insulin lispro  0-16 Units SubCUTAneous TID WC    insulin lispro  0-4 Units SubCUTAneous Nightly    insulin lispro  5 Units SubCUTAneous TID WC    sodium chloride flush  5-40 mL IntraVENous 2 times per day    enoxaparin  30 mg SubCUTAneous BID    allopurinol  300 mg Oral Daily    atorvastatin  80 mg Oral Nightly    carvedilol  3.125 mg Oral BID WC    ferrous sulfate  325 mg Oral BID    pantoprazole  40 mg Oral QAM AC    pregabalin  25 mg Oral BID    tamsulosin  0.4 mg Oral Daily    fluticasone  1 spray Each Nostril Daily midodrine  5 mg Oral TID      PRN Meds: oxyCODONE-acetaminophen, sodium chloride, cyclobenzaprine, sodium chloride flush, sodium chloride, polyethylene glycol, acetaminophen **OR** acetaminophen, glucose, dextrose bolus **OR** dextrose bolus, glucagon (rDNA), dextrose, prochlorperazine      Intake/Output Summary (Last 24 hours) at 8/29/2022 1935  Last data filed at 8/29/2022 1912  Gross per 24 hour   Intake 300 ml   Output 2300 ml   Net -2000 ml       Physical Exam Performed:    BP (!) 109/51   Pulse 80   Temp 98.1 °F (36.7 °C) (Oral)   Resp 18   Ht 5' 9\" (1.753 m)   Wt 293 lb 6.4 oz (133.1 kg)   SpO2 98%   BMI 43.33 kg/m²     General appearance: No apparent distress, appears stated age and cooperative. HEENT: Pupils equal, round, and reactive to light. Conjunctivae/corneas clear. Neck: Supple, with full range of motion. No jugular venous distention. Trachea midline. Respiratory: 4 L O2 via nasal cannula. Normal respiratory effort. Clear to auscultation, bilaterally without Rales/Wheezes/Rhonchi. Cardiovascular: Regular rate and rhythm with normal S1/S2 without murmurs, rubs or gallops. Abdomen: Ascites present. Soft, non-tender, non-distended with normal bowel sounds. Musculoskeletal: Bilateral lower extremity 3+ edema. No clubbing, cyanosis or edema bilaterally. Full range of motion without deformity. Skin: Skin color, texture, turgor normal.  No rashes or lesions. Neurologic:  Neurovascularly intact without any focal sensory/motor deficits.  Cranial nerves: II-XII intact, grossly non-focal.  Psychiatric: Alert and oriented, thought content appropriate, normal insight  Capillary Refill: Brisk,3 seconds, normal   Peripheral Pulses: +2 palpable, equal bilaterally       Labs:   Recent Labs     08/27/22  0616 08/29/22  0656   WBC 5.0 4.3   HGB 9.7* 10.6*   HCT 29.8* 33.1*    138     Recent Labs     08/27/22  0616 08/28/22  0600 08/29/22  0656    138 139   K 3.5 3.4* 3.6   CL 94* 92* 92*   CO2 33* 38* 35*   * 110* 104*   CREATININE 2.1* 1.8* 1.7*   CALCIUM 10.4 9.9 9.8     No results for input(s): AST, ALT, BILIDIR, BILITOT, ALKPHOS in the last 72 hours. Recent Labs     08/29/22  0656 08/29/22  0958   INR 1.31* 1.40*     No results for input(s): Lori Anna in the last 72 hours. Urinalysis:      Lab Results   Component Value Date/Time    NITRU Negative 05/01/2022 09:53 PM    WBCUA 0-2 11/05/2013 10:31 AM    BACTERIA Rare 11/05/2013 10:31 AM    RBCUA 0-2 11/05/2013 10:31 AM    BLOODU Negative 05/01/2022 09:53 PM    SPECGRAV 1.010 05/01/2022 09:53 PM    GLUCOSEU Negative 05/01/2022 09:53 PM    GLUCOSEU NEGATIVE 02/29/2012 03:17 PM       Radiology:  US GUIDED PARACENTESIS   Final Result   Successful paracentesis. CT ABDOMEN PELVIS WO CONTRAST Additional Contrast? None   Final Result   1. Hepatosplenomegaly with large amount of ascites. 2. Sigmoid diverticulosis. 3. Cardiomegaly with pulmonary edema in the lung bases consistent with   congestive heart failure. 4. 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 5. Anasarca. 6. Gynecomastia. XR CHEST PORTABLE   Final Result   Worsening compared to the previous evaluation from August 18, 2022 with more   pronounced pulmonary edema related to fluid overload/CHF. Small right   effusion. Right lower lobe atelectasis/consolidation.              Consults  IP CONSULT TO HEART FAILURE NURSE/COORDINATOR  IP CONSULT TO DIETITIAN  IP CONSULT TO NEPHROLOGY  IP CONSULT TO CARDIOLOGY  IP CONSULT TO SOCIAL WORK      Assessment/Plan:    Active Hospital Problems    Diagnosis     MARGARITO (acute kidney injury) ([facility] [address] [N17.9]      Priority: Medium    Fluid overload [E87.70]      Priority: Medium    Obesity, Class III, BMI 40-49.9 (morbid [facility]) [facility] [E66.01]     Chronic diastolic heart failure (HCC) [I50.32]     CKD (chronic kidney disease) stage 3, GFR 30-59 ml/min (HCC) [N18.30]     Essential hypertension [I10] Acute on chronic diastolic heart failure  -Echo: EF 50-55%; G3 DD; flattened interventricular septum, c/w R sided overload; severe pHTN  -Daily weights. On admission 137.1kg, down to 133.8kg  -Strict I&O  -No ACE d/t renal disease  -Continue carvedilol  -BNP on admission 6892  -CHF nurse consulted  -Cardiac diet w/ 1500mL fluid restriction  -Cardiology consulted, appreciate recommendations     Acute kidney injury on CKD stage 3b  -Baseline creat 1.5-1.7.  On admission, 1.9  -Likely due to venous congestion 2/2 heart failure  -Avoid nephrotoxics as able  -Nephrology following, appreciate recommendations  -Continue lasix gtt per nephrology   -Monitor     Ascites  -CT abd/pelvis: hepatosplenomegaly, large amount of ascites; cardiomegaly w/ pulm edema  -Order for paracentesis today, as well as fluid studies  -Continue diuresis per renal  -Likely due to cardio-renal syndrome     Azotemia  - on admission  -Nephro is following  -Monitor     Hypokalemia/magnesemia  -Monitor and replete as needed  -Monitor on telemetry  -Please call for replacement as needed, given renal disease will not place standing orders  -Will follow K closely while on lasix gtt     Hyponatremia, mild  -Na 134 on arrival, now 138  -Monitor     Diabetes type II, uncontrolled, with peripheral neuropathy  -A1C 8.9 this admission  -Basal bolus dose, prandial and sliding scale insulin, monitor and adjust as needed  -Carb control diet  -Continue home dose lyrica     Chronic constipation  -Daily miralax, senna  -Relistor x1 on 8/26     History of hypertension  -Borderline hypotension on admission with SBP 91  -Per renal, ideally keep SBP >120  -Continue midodrine  -Monitor     Hyperlipidemia, controlled  -LDL 65 (4/2022)  -Continue high-intensity statin therapy     Chronic hypoxic respiratory failure  -Wears 4L at baseline  -Stable on this at this time  -Monitor     Chronic pain  -Continue home regimen     Goals of care  -Discussed code status with pt, as during previous admission he was DNR-CCA. At this time, pt chose to allow for compressions, medications, and defibrillation. But does not want intubated. Order placed for Limited code with no intubation checked     Morbid obesity  -Body mass index is 46.92 kg/m².   -Complicating assessment and treatment. Placing patient at risk for multiple co-morbidities as well as early death and contributing to the patient's presentation  -Counseled on weight loss. DVT Prophylaxis: Lovenox  Diet: ADULT DIET; Regular; 4 carb choices (60 gm/meal);  Low Sodium (2 gm); 1500 ml  Code Status: Limited    PT/OT Eval Status: Long-term care with PT    Dispo -1 to 2 days pending clinical course    J Carlos Carr, [place]

## 2022-08-29 NOTE — BRIEF OP NOTE
Brief Postoperative Note    Ezequiel Cardoso  YOB: 1955  [id number]    Pre-operative Diagnosis: ascites    Post-operative Diagnosis: Same    Procedure: US-guided paracentesis    Anesthesia: Local    Surgeons: Rakel Silva MD    Estimated Blood Loss: Less than 5 mL    Complications: None    Specimens: ascites drained    Findings: Successful US-guided paracentesis.     Electronically signed by Rakel Silva MD on 8/29/2022 at 2:35 PM

## 2022-08-29 NOTE — PROGRESS NOTES
Physical Therapy  Facility/Department: [facility] A2 CARD TELEMETRY  Daily Treatment Note  NAME: Mariola Yuan  : 1955  MRN: [id number]    Date of Service: 2022    Discharge Recommendations:  [address] with PT   PT Equipment Recommendations  Equipment Needed: Yes  Mobility Devices: Renard Alasor: Inocencio      AMPAC 6 Clicks Inpatient Mobility:  AM-PAC Mobility Inpatient   How much difficulty turning over in bed?: A Little  How much difficulty sitting down on / standing up from a chair with arms?: A Little  How much difficulty moving from lying on back to sitting on side of bed?: A Little  How much help from another person moving to and from a bed to a chair?: A Little  How much help from another person needed to walk in hospital room?: A Little  How much help from another person for climbing 3-5 steps with a railing?: A Lot  AM-PAC Inpatient Mobility Raw Score : 17  AM-PAC Inpatient T-Scale Score : 42.13  Mobility Inpatient CMS 0-100% Score: 50.57  Mobility Inpatient CMS G-Code Modifier : CK    Patient Diagnosis(es): There were no encounter diagnoses. Assessment   Assessment: Pt grossly SBA for transfers and ambulation with RW. Pt continues to be limited by fatigue and decreased endurance. Pt able to complete seated ther ex. Will continue to progress mobility as pt tolerates. Recommend return to LTC with PT. Activity Tolerance: Patient limited by fatigue;Patient limited by endurance  Equipment Needed: Yes  Mobility Devices: [address]: 3-5 times per week  Current Treatment Recommendations: Strengthening;Balance training;Functional mobility training;Transfer training;Gait training; Endurance training;Patient/Caregiver education & training; Safety education & training;Home exercise program;Therapeutic activities; Equipment evaluation, education, & procurement; Neuromuscular re-education     Restrictions  Restrictions/Precautions  Restrictions/Precautions: General Precautions, Fall and endurance by 8/30. - PROGRESSING 8/26  Short term goal 5: Pt will meet 4/4 HF education goals. -- GOAL MET 8/26  Patient Goals   Patient goals : Pt does not indicate.     Education  Patient Education  Education Given To: Patient  Education Provided: Role of Therapy;Transfer Training;Plan of Care;Energy Conservation  Education Method: Verbal;Demonstration  Barriers to Learning: None  Education Outcome: Verbalized understanding    Therapy Time   Individual Concurrent Group Co-treatment   Time In 1130         Time Out 1158         Minutes 28         Timed Code Treatment Minutes: [id number] [place] [address]

## 2022-08-29 NOTE — CARE COORDINATION
Chart reviewed day #5. Per chart review and RN, plan is for patient to have a paracentesis today. He is still on a Lasix drip. Patient is long term at [facility]. Therapy recommending that he return to his facility skilled. Placed call to Laura Nath to update her on the above. She states they will take him back skilled but that there will be no barriers as he is a resident there.

## 2022-08-29 NOTE — PROGRESS NOTES
LABGLOM 40 08/29/2022 06:56 AM    GLUCOSE 213 08/29/2022 06:56 AM       IMPRESSION/RECOMMENDATIONS:      1- A/CKD: The patient has chronic kidney disease with a baseline creatinine of 1.5 to 1.7 mg/dl. His MARGARITO is likely secondary to venous congestion in the setting of diastolic heart failure decompensation. His urinary output is well maintained   -continue IV Lasix drip at 10 mg/h for next 24h and monitor--> will hold Lasix drip due to large volume paracentesis and will give IV albumin 25 g every 6h  -8 L paracentesis done afternoon on 8/29    2- Hypokalemia: Started on maintenance K replacement 20 meq daily, PRN potassium replacement. 3- HTN: Blood pressure improving with Midodrine. 4- Anemia: Stable hemoglobin, monitor.       Overall prognosis poor

## 2022-08-30 LAB
ANION GAP SERPL CALCULATED.3IONS-SCNC: 9 MMOL/L (ref 3–16)
BASOPHILS ABSOLUTE: 0 K/UL (ref 0–0.2)
BASOPHILS RELATIVE PERCENT: 0.7 %
BUN BLDV-MCNC: 96 MG/DL (ref 7–20)
CALCIUM SERPL-MCNC: 9.8 MG/DL (ref 8.3–10.6)
CHLORIDE BLD-SCNC: 93 MMOL/L (ref 99–110)
CO2: 35 MMOL/L (ref 21–32)
CREAT SERPL-MCNC: 1.7 MG/DL (ref 0.8–1.3)
EOSINOPHILS ABSOLUTE: 0.1 K/UL (ref 0–0.6)
EOSINOPHILS RELATIVE PERCENT: 3.1 %
GFR AFRICAN AMERICAN: 49
GFR NON-AFRICAN AMERICAN: 40
GLUCOSE BLD-MCNC: 181 MG/DL (ref 70–99)
GLUCOSE BLD-MCNC: 196 MG/DL (ref 70–99)
GLUCOSE BLD-MCNC: 225 MG/DL (ref 70–99)
GLUCOSE BLD-MCNC: 233 MG/DL (ref 70–99)
GLUCOSE BLD-MCNC: 320 MG/DL (ref 70–99)
HCT VFR BLD CALC: 31.4 % (ref 40.5–52.5)
HEMOGLOBIN: 9.9 G/DL (ref 13.5–17.5)
LYMPHOCYTES ABSOLUTE: 0.5 K/UL (ref 1–5.1)
LYMPHOCYTES RELATIVE PERCENT: 10.7 %
MCH RBC QN AUTO: 26.3 PG (ref 26–34)
MCHC RBC AUTO-ENTMCNC: 31.6 G/DL (ref 31–36)
MCV RBC AUTO: 83.2 FL (ref 80–100)
MONOCYTES ABSOLUTE: 0.3 K/UL (ref 0–1.3)
MONOCYTES RELATIVE PERCENT: 7.2 %
NEUTROPHILS ABSOLUTE: 3.7 K/UL (ref 1.7–7.7)
NEUTROPHILS RELATIVE PERCENT: 78.3 %
PDW BLD-RTO: 19.4 % (ref 12.4–15.4)
PERFORMED ON: ABNORMAL
PLATELET # BLD: 140 K/UL (ref 135–450)
PMV BLD AUTO: 8.7 FL (ref 5–10.5)
POTASSIUM SERPL-SCNC: 4 MMOL/L (ref 3.5–5.1)
RBC # BLD: 3.77 M/UL (ref 4.2–5.9)
SODIUM BLD-SCNC: 137 MMOL/L (ref 136–145)
WBC # BLD: 4.7 K/UL (ref 4–11)

## 2022-08-30 PROCEDURE — P9047 ALBUMIN (HUMAN), 25%, 50ML: HCPCS | Performed by: INTERNAL MEDICINE

## 2022-08-30 PROCEDURE — 85025 COMPLETE CBC W/AUTO DIFF WBC: CPT

## 2022-08-30 PROCEDURE — 2580000003 HC RX 258

## 2022-08-30 PROCEDURE — 6360000002 HC RX W HCPCS

## 2022-08-30 PROCEDURE — 97530 THERAPEUTIC ACTIVITIES: CPT

## 2022-08-30 PROCEDURE — 80048 BASIC METABOLIC PNL TOTAL CA: CPT

## 2022-08-30 PROCEDURE — 2700000000 HC OXYGEN THERAPY PER DAY

## 2022-08-30 PROCEDURE — 2060000000 HC ICU INTERMEDIATE R&B

## 2022-08-30 PROCEDURE — 36415 COLL VENOUS BLD VENIPUNCTURE: CPT

## 2022-08-30 PROCEDURE — 6360000002 HC RX W HCPCS: Performed by: INTERNAL MEDICINE

## 2022-08-30 PROCEDURE — 6370000000 HC RX 637 (ALT 250 FOR IP)

## 2022-08-30 PROCEDURE — 94761 N-INVAS EAR/PLS OXIMETRY MLT: CPT

## 2022-08-30 PROCEDURE — 97535 SELF CARE MNGMENT TRAINING: CPT

## 2022-08-30 PROCEDURE — 6370000000 HC RX 637 (ALT 250 FOR IP): Performed by: INTERNAL MEDICINE

## 2022-08-30 RX ORDER — FUROSEMIDE 10 MG/ML
40 INJECTION INTRAMUSCULAR; INTRAVENOUS 2 TIMES DAILY
Status: DISCONTINUED | OUTPATIENT
Start: 2022-08-30 | End: 2022-08-31

## 2022-08-30 RX ADMIN — FERROUS SULFATE TAB 325 MG (65 MG ELEMENTAL FE) 325 MG: 325 (65 FE) TAB at 21:18

## 2022-08-30 RX ADMIN — INSULIN LISPRO 5 UNITS: 100 INJECTION, SOLUTION INTRAVENOUS; SUBCUTANEOUS at 11:44

## 2022-08-30 RX ADMIN — INSULIN LISPRO 5 UNITS: 100 INJECTION, SOLUTION INTRAVENOUS; SUBCUTANEOUS at 08:40

## 2022-08-30 RX ADMIN — FUROSEMIDE 40 MG: 10 INJECTION, SOLUTION INTRAMUSCULAR; INTRAVENOUS at 08:34

## 2022-08-30 RX ADMIN — ALLOPURINOL 300 MG: 300 TABLET ORAL at 08:29

## 2022-08-30 RX ADMIN — CARVEDILOL 3.12 MG: 3.12 TABLET, FILM COATED ORAL at 17:27

## 2022-08-30 RX ADMIN — POLYETHYLENE GLYCOL 3350 17 G: 17 POWDER, FOR SOLUTION ORAL at 21:17

## 2022-08-30 RX ADMIN — FUROSEMIDE 40 MG: 10 INJECTION, SOLUTION INTRAMUSCULAR; INTRAVENOUS at 17:30

## 2022-08-30 RX ADMIN — ATORVASTATIN CALCIUM 80 MG: 80 TABLET, FILM COATED ORAL at 21:18

## 2022-08-30 RX ADMIN — CARVEDILOL 3.12 MG: 3.12 TABLET, FILM COATED ORAL at 08:29

## 2022-08-30 RX ADMIN — FLUTICASONE PROPIONATE 1 SPRAY: 50 SPRAY, METERED NASAL at 08:34

## 2022-08-30 RX ADMIN — MIDODRINE HYDROCHLORIDE 5 MG: 5 TABLET ORAL at 17:27

## 2022-08-30 RX ADMIN — OXYCODONE AND ACETAMINOPHEN 1 TABLET: 325; 5 TABLET ORAL at 01:38

## 2022-08-30 RX ADMIN — PREGABALIN 25 MG: 25 CAPSULE ORAL at 21:18

## 2022-08-30 RX ADMIN — INSULIN LISPRO 12 UNITS: 100 INJECTION, SOLUTION INTRAVENOUS; SUBCUTANEOUS at 11:44

## 2022-08-30 RX ADMIN — MIDODRINE HYDROCHLORIDE 5 MG: 5 TABLET ORAL at 11:44

## 2022-08-30 RX ADMIN — INSULIN LISPRO 5 UNITS: 100 INJECTION, SOLUTION INTRAVENOUS; SUBCUTANEOUS at 17:27

## 2022-08-30 RX ADMIN — ALBUMIN (HUMAN) 25 G: 0.25 INJECTION, SOLUTION INTRAVENOUS at 05:08

## 2022-08-30 RX ADMIN — NALOXEGOL OXALATE 12.5 MG: 12.5 TABLET, FILM COATED ORAL at 05:39

## 2022-08-30 RX ADMIN — Medication 400 MG: at 08:29

## 2022-08-30 RX ADMIN — PREGABALIN 25 MG: 25 CAPSULE ORAL at 08:29

## 2022-08-30 RX ADMIN — POLYETHYLENE GLYCOL 3350 17 G: 17 POWDER, FOR SOLUTION ORAL at 08:28

## 2022-08-30 RX ADMIN — ALBUMIN (HUMAN) 25 G: 0.25 INJECTION, SOLUTION INTRAVENOUS at 10:22

## 2022-08-30 RX ADMIN — CYCLOBENZAPRINE 5 MG: 10 TABLET, FILM COATED ORAL at 18:24

## 2022-08-30 RX ADMIN — OXYCODONE AND ACETAMINOPHEN 1 TABLET: 325; 5 TABLET ORAL at 16:11

## 2022-08-30 RX ADMIN — POTASSIUM CHLORIDE 20 MEQ: 1500 TABLET, EXTENDED RELEASE ORAL at 08:29

## 2022-08-30 RX ADMIN — TAMSULOSIN HYDROCHLORIDE 0.4 MG: 0.4 CAPSULE ORAL at 05:39

## 2022-08-30 RX ADMIN — OXYCODONE AND ACETAMINOPHEN 1 TABLET: 325; 5 TABLET ORAL at 21:26

## 2022-08-30 RX ADMIN — SENNOSIDES 17.2 MG: 8.6 TABLET, COATED ORAL at 21:18

## 2022-08-30 RX ADMIN — OXYCODONE AND ACETAMINOPHEN 1 TABLET: 325; 5 TABLET ORAL at 10:19

## 2022-08-30 RX ADMIN — FERROUS SULFATE TAB 325 MG (65 MG ELEMENTAL FE) 325 MG: 325 (65 FE) TAB at 08:29

## 2022-08-30 RX ADMIN — INSULIN LISPRO 4 UNITS: 100 INJECTION, SOLUTION INTRAVENOUS; SUBCUTANEOUS at 08:38

## 2022-08-30 RX ADMIN — Medication 400 MG: at 21:18

## 2022-08-30 RX ADMIN — ENOXAPARIN SODIUM 30 MG: 100 INJECTION SUBCUTANEOUS at 21:17

## 2022-08-30 RX ADMIN — PANTOPRAZOLE SODIUM 40 MG: 40 TABLET, DELAYED RELEASE ORAL at 05:39

## 2022-08-30 RX ADMIN — INSULIN GLARGINE 50 UNITS: 100 INJECTION, SOLUTION SUBCUTANEOUS at 21:19

## 2022-08-30 RX ADMIN — ENOXAPARIN SODIUM 30 MG: 100 INJECTION SUBCUTANEOUS at 08:29

## 2022-08-30 RX ADMIN — SODIUM CHLORIDE, PRESERVATIVE FREE 10 ML: 5 INJECTION INTRAVENOUS at 08:29

## 2022-08-30 RX ADMIN — SODIUM CHLORIDE, PRESERVATIVE FREE 10 ML: 5 INJECTION INTRAVENOUS at 21:18

## 2022-08-30 RX ADMIN — MIDODRINE HYDROCHLORIDE 5 MG: 5 TABLET ORAL at 08:29

## 2022-08-30 RX ADMIN — OXYCODONE AND ACETAMINOPHEN 1 TABLET: 325; 5 TABLET ORAL at 05:39

## 2022-08-30 ASSESSMENT — PAIN SCALES - GENERAL
PAINLEVEL_OUTOF10: 7
PAINLEVEL_OUTOF10: 7
PAINLEVEL_OUTOF10: 8
PAINLEVEL_OUTOF10: 8
PAINLEVEL_OUTOF10: 3
PAINLEVEL_OUTOF10: 0
PAINLEVEL_OUTOF10: 0
PAINLEVEL_OUTOF10: 4
PAINLEVEL_OUTOF10: 0
PAINLEVEL_OUTOF10: 4
PAINLEVEL_OUTOF10: 8

## 2022-08-30 ASSESSMENT — PAIN DESCRIPTION - DESCRIPTORS
DESCRIPTORS: ACHING
DESCRIPTORS: CRAMPING
DESCRIPTORS: ACHING

## 2022-08-30 ASSESSMENT — PAIN DESCRIPTION - ONSET
ONSET: ON-GOING

## 2022-08-30 ASSESSMENT — PAIN DESCRIPTION - LOCATION
LOCATION: GENERALIZED
LOCATION: ABDOMEN

## 2022-08-30 ASSESSMENT — PAIN DESCRIPTION - ORIENTATION
ORIENTATION: LEFT;RIGHT
ORIENTATION: RIGHT;LEFT

## 2022-08-30 ASSESSMENT — PAIN DESCRIPTION - PAIN TYPE
TYPE: ACUTE PAIN
TYPE: CHRONIC PAIN
TYPE: ACUTE PAIN

## 2022-08-30 ASSESSMENT — PAIN SCALES - WONG BAKER: WONGBAKER_NUMERICALRESPONSE: 0

## 2022-08-30 ASSESSMENT — PAIN DESCRIPTION - FREQUENCY
FREQUENCY: CONTINUOUS

## 2022-08-30 NOTE — PROGRESS NOTES
Hospitalist Progress Note      PCP: No primary care provider on file. Date of Admission: 8/24/2022    Chief Complaint:   Weight gain, shortness of breath    Hospital Course:   75 y.o. male with past medical history significant for CHF, CAD, CKD, DM2 with peripheral neuropathy, hypertension, hyperlipidemia, morbid obesity, constipation, chronic pain, CAD s/p CABG. He was in the ER 8/18 for similar complaints and left AMA due to wait time (he sated 32 hours). He went to his cardiologist appointment today for worsening BLE edema, dyspnea, and fluid in his abdomen. He was direct admitted from there in order to by-pass the ED. He reports a 20lb weight gain. He denies denies chest pain, n/v/d, dysuria, fever, chills, and headache; reports neuropathy, and constipation. There is no radiation of symptoms. No aggravating or alleviating factors. Subjective:   Seen sitting up in chair at bedside working with occupational therapist.  No new complaints. Vital signs stable. Afebrile.       Medications:  Reviewed    Infusion Medications    sodium chloride      dextrose       Scheduled Medications    furosemide  40 mg IntraVENous BID    senna  2 tablet Oral Nightly    naloxegol  12.5 mg Oral QAM AC    polyethylene glycol  17 g Oral BID    magnesium oxide  400 mg Oral BID    potassium chloride  20 mEq Oral Daily    insulin glargine  50 Units SubCUTAneous Nightly    insulin lispro  0-16 Units SubCUTAneous TID     insulin lispro  0-4 Units SubCUTAneous Nightly    insulin lispro  5 Units SubCUTAneous TID     sodium chloride flush  5-40 mL IntraVENous 2 times per day    enoxaparin  30 mg SubCUTAneous BID    allopurinol  300 mg Oral Daily    atorvastatin  80 mg Oral Nightly    carvedilol  3.125 mg Oral BID WC    ferrous sulfate  325 mg Oral BID    pantoprazole  40 mg Oral QAM AC    pregabalin  25 mg Oral BID    tamsulosin  0.4 mg Oral Daily    fluticasone  1 spray Each Nostril Daily    midodrine  5 mg Oral TID  PRN Meds: oxyCODONE-acetaminophen, sodium chloride, cyclobenzaprine, sodium chloride flush, sodium chloride, polyethylene glycol, acetaminophen **OR** acetaminophen, glucose, dextrose bolus **OR** dextrose bolus, glucagon (rDNA), dextrose, prochlorperazine      Intake/Output Summary (Last 24 hours) at 8/30/2022 1920  Last data filed at 8/30/2022 1916  Gross per 24 hour   Intake 2335 ml   Output 2550 ml   Net -215 ml       Physical Exam Performed:    /60   Pulse 100   Temp 98.1 °F (36.7 °C) (Oral)   Resp 20   Ht 5' 9\" (1.753 m)   Wt 277 lb 3.2 oz (125.7 kg)   SpO2 90%   BMI 40.94 kg/m²     General appearance: No apparent distress, appears stated age and cooperative. HEENT: Pupils equal, round, and reactive to light. Conjunctivae/corneas clear. Neck: Supple, with full range of motion. No jugular venous distention. Trachea midline. Respiratory: 4 L O2 via nasal cannula. Normal respiratory effort. Clear to auscultation, bilaterally without Rales/Wheezes/Rhonchi. Cardiovascular: Regular rate and rhythm with normal S1/S2 without murmurs, rubs or gallops. Abdomen: Ascites present. Soft, non-tender, non-distended with normal bowel sounds. Musculoskeletal: Bilateral lower extremity 3+ edema. No clubbing, cyanosis or edema bilaterally. Full range of motion without deformity. Skin: Skin color, texture, turgor normal.  No rashes or lesions. Neurologic:  Neurovascularly intact without any focal sensory/motor deficits.  Cranial nerves: II-XII intact, grossly non-focal.  Psychiatric: Alert and oriented, thought content appropriate, normal insight  Capillary Refill: Brisk,3 seconds, normal   Peripheral Pulses: +2 palpable, equal bilaterally       Labs:   Recent Labs     08/29/22  0656 08/30/22  0733   WBC 4.3 4.7   HGB 10.6* 9.9*   HCT 33.1* 31.4*    140     Recent Labs     08/28/22  0600 08/29/22  0656 08/30/22  0733    139 137   K 3.4* 3.6 4.0   CL 92* 92* 93*   CO2 38* 35* 35*   * 104* 96*   CREATININE 1.8* 1.7* 1.7*   CALCIUM 9.9 9.8 9.8     No results for input(s): AST, ALT, BILIDIR, BILITOT, ALKPHOS in the last 72 hours. Recent Labs     08/29/22  0656 08/29/22  0958   INR 1.31* 1.40*     No results for input(s): [place] in the last 72 hours. Urinalysis:      Lab Results   Component Value Date/Time    NITRU Negative 05/01/2022 09:53 PM    WBCUA 0-2 11/05/2013 10:31 AM    BACTERIA Rare 11/05/2013 10:31 AM    RBCUA 0-2 11/05/2013 10:31 AM    BLOODU Negative 05/01/2022 09:53 PM    SPECGRAV 1.010 05/01/2022 09:53 PM    GLUCOSEU Negative 05/01/2022 09:53 PM    GLUCOSEU NEGATIVE 02/29/2012 03:17 PM       Radiology:  US GUIDED PARACENTESIS   Final Result   Successful paracentesis. CT ABDOMEN PELVIS WO CONTRAST Additional Contrast? None   Final Result   1. Hepatosplenomegaly with large amount of ascites. 2. Sigmoid diverticulosis. 3. Cardiomegaly with pulmonary edema in the lung bases consistent with   congestive heart failure. 4. 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 5. Anasarca. 6. Gynecomastia. XR CHEST PORTABLE   Final Result   Worsening compared to the previous evaluation from August 18, 2022 with more   pronounced pulmonary edema related to fluid overload/CHF. Small right   effusion. Right lower lobe atelectasis/consolidation.              Consults  IP CONSULT TO HEART FAILURE NURSE/COORDINATOR  IP CONSULT TO DIETITIAN  IP CONSULT TO NEPHROLOGY  IP CONSULT TO CARDIOLOGY  IP CONSULT TO SOCIAL WORK      Assessment/Plan:    Active Hospital Problems    Diagnosis     MARGARITO (acute kidney injury) ([facility] [address] [N17.9]      Priority: Medium    Fluid overload [E87.70]      Priority: Medium    Obesity, Class III, BMI 40-49.9 (morbid obesity) ([facility] [address] [E66.01]     Chronic diastolic heart failure (HCC) [I50.32]     CKD (chronic kidney disease) stage 3, GFR 30-59 ml/min (HCC) [N18.30]     Essential hypertension [I10]      Acute on chronic diastolic heart failure  -Echo: EF 50-55%; G3 DD; flattened interventricular septum, c/w R sided overload; severe pHTN  -Daily weights. On admission 137.1kg, down to 133.8kg  -Strict I&O  -No ACE d/t renal disease  -Continue carvedilol  -BNP on admission 6892  -CHF nurse consulted  -Cardiac diet w/ 1500mL fluid restriction  -Cardiology consulted, appreciate recommendations  - Still on IV Lasix with plans to transition to torsemide tomorrow     Acute kidney injury on CKD stage 3b  -Baseline creat 1.5-1.7. On admission, 1.9  -Likely due to venous congestion 2/2 heart failure  -Avoid nephrotoxics as able  -Nephrology following, appreciate recommendations     Ascites  -CT abd/pelvis: hepatosplenomegaly, large amount of ascites; cardiomegaly w/ pulm edema  -Paracentesis on 8/29/2022 with removal of 8 L with fluid studies pending  -Continue diuresis per renal  -Likely due to cardio-renal syndrome     Azotemia  - on admission  -Nephro is following  -Monitor     Hypokalemia/magnesemia  -Monitor and replete as needed  -Monitor on telemetry  -Will follow K closely while on lasix      Hyponatremia, mild  -Na 134 on arrival, now 137  -Monitor     Diabetes type II, uncontrolled, with peripheral neuropathy  -A1C 8.9 this admission  -Basal bolus dose, prandial and sliding scale insulin, monitor and adjust as needed  -Carb control diet  -Continue home dose lyrica     Chronic constipation  -Daily miralax, senna  -Relistor x1 on 8/26     History of hypertension  -Borderline hypotension on admission with SBP 91  -Per renal, ideally keep SBP >120  -Continue midodrine  -Monitor     Hyperlipidemia, controlled  -LDL 65 (4/2022)  -Continue high-intensity statin therapy     Chronic hypoxic respiratory failure  -Wears 4L at baseline  -Stable on this at this time  -Monitor     Chronic pain  -Continue home regimen     Goals of care  -Discussed code status with pt, as during previous admission he was DNR-CCA.  At this time, pt chose to allow for compressions, medications, and defibrillation. But does not want intubated. Order placed for Limited code with no intubation checked     Morbid obesity  -Body mass index is 54.32 kg/m².   -Complicating assessment and treatment. Placing patient at risk for multiple co-morbidities as well as early death and contributing to the patient's presentation  -Counseled on weight loss. DVT Prophylaxis: Lovenox  Diet: ADULT DIET; Regular; 4 carb choices (60 gm/meal);  Low Sodium (2 gm); 1500 ml  Code Status: Limited    PT/OT Eval Status: Long-term care with PT/OT    Dispo -1 to 2 days pending clinical course    Donell Gautam

## 2022-08-30 NOTE — ACP (ADVANCE CARE PLANNING)
Advance Care Planning     Advance Care Planning Activator (Inpatient)  Conversation Note      Date of ACP Conversation: 8/30/2022     Conversation Conducted with: Patient with Decision Making Capacity    ACP Activator: Ceferino Aguilar RN    {    Health Care Decision Maker:     Current Designated Health Care Decision Maker:     Primary Decision Maker: Fransico Olivier - [phone]    Secondary Decision Maker: Hudson Moore Genet Rahman - [phone]      Care Preferences    Ventilation: \"If you were in your present state of health and suddenly became very ill and were unable to breathe on your own, what would your preference be about the use of a ventilator (breathing machine) if it were available to you? \"      Would the patient desire the use of ventilator (breathing machine)?: no    \"If your health worsens and it becomes clear that your chance of recovery is unlikely, what would your preference be about the use of a ventilator (breathing machine) if it were available to you? \"     Would the patient desire the use of ventilator (breathing machine)?: No      Resuscitation  \"CPR works best to restart the heart when there is a sudden event, like a heart attack, in someone who is otherwise healthy. Unfortunately, CPR does not typically restart the heart for people who have serious health conditions or who are very sick. \"    \"In the event your heart stopped as a result of an underlying serious health condition, would you want attempts to be made to restart your heart (answer \"yes\" for attempt to resuscitate) or would you prefer a natural death (answer \"no\" for do not attempt to resuscitate)? \" yes       [] Yes   [] No   Educated Patient / Markus Liu regarding differences between Advance Directives and portable DNR orders.     Length of ACP Conversation in minutes:      Conversation Outcomes:  [] ACP discussion completed  [x] Existing advance directive reviewed with patient; no changes to patient's previously recorded wishes  [] New Advance Directive completed  [] Portable Do Not Rescitate prepared for Provider review and signature  [] POLST/POST/MOLST/MOST prepared for Provider review and signature      Follow-up plan:    [] Schedule follow-up conversation to continue planning  [x] Referred individual to Provider for additional questions/concerns   [] Advised patient/agent/surrogate to review completed ACP document and update if needed with changes in condition, patient preferences or care setting    [] This note routed to one or more involved healthcare providers    María Armando RN

## 2022-08-30 NOTE — CARE COORDINATION
Spoke with RN and PA both who state patient will get another day of diuresing. Patient still on Midodrine. Patient from [facility] SYS [facility]EL[facility] and will return with no barriers from CM standpoint.

## 2022-08-30 NOTE — PLAN OF CARE
Patient's EF (Ejection Fraction) is greater than 40%    Heart Failure Medications:  Diuretics[de-identified] Furosemide    (One of the following REQUIRED for EF </= 40%/SYSTOLIC FAILURE but MAY be used in EF% >40%/DIASTOLIC FAILURE)        ACE[de-identified] None        ARB[de-identified] None         ARNI[de-identified] None    (Beta Blockers)  NON- Evidenced Based Beta Blocker (for EF% >40%/DIASTOLIC FAILURE): None    Evidenced Based Beta Blocker::(REQUIRED for EF% <40%/SYSTOLIC FAILURE) Carvedilol- Coreg  . .................................................................................................................................................. Patient's weights and intake/output reviewed: Yes    Patient's Last Weight: 277 lbs obtained by standing scale. Difference of 16 lbs less than last documented weight. Intake/Output Summary (Last 24 hours) at 8/30/2022 1854  Last data filed at 8/30/2022 1755  Gross per 24 hour   Intake 2215 ml   Output 2850 ml   Net -635 ml       Comorbidities Reviewed Yes    Patient has a past medical history of Anemia, Angina, Arthritis, CAD (coronary artery disease), CHF (congestive heart failure) ([address] CKD (chronic kidney disease) stage 3, GFR 30-59 ml/min ([facility]), Clostridium difficile diarrhea, Diabetes mellitus ([address] Diabetic neuropathy ([address] Disease of blood and blood forming organ, Dizziness, GERD (gastroesophageal reflux disease), Headache, Hearing loss, Hyperlipidemia, Hypertension, Kidney stone, Refusal of blood transfusions as patient is [employer], Sleep apnea, Tinnitus, Venous ulcer ([address] and Wound, open. >>For CHF and Comorbidity documentation on Education Time and Topics, please see Education Tab    Progressive Mobility Assessment:  What is this patient's Current Level of Mobility?: Ambulatory- with Assistance  How was this patient Mobilized today?: Edge of Bed, Up to Chair, Bedside Commode,  Up to Toilet/Shower, and Up in Room, ambulated 20 ft                 With Whom?  Nurse, PCA, PT, OT, and Self                 Level of Difficulty/Assistance: 1x Assist     Pt resting in bed at this time on  4 L O2. Pt denies shortness of breath. Pt with nonpitting lower extremity edema.      Patient and/or Family's stated Goal of Care this Admission: reduce shortness of breath, increase activity tolerance, better understand heart failure and disease management, be more comfortable, and reduce lower extremity edema prior to discharge        :

## 2022-08-30 NOTE — PROGRESS NOTES
Nephrology Progress Note   [facility]      Sub/interval history    C/o intermittently cramps  Paracentesis of 8l on 8/29  Receiving iv albumin  Lasix gtt turned off after para on 8/29  Cr 1.7    Last 24h uop 2.85 liters   Wt 295-->293--> 277 lbs ( 302 on admission)    ROS: No chest pain/shortness of breath/fever/nausea/vomiting  PSFH: No visitor    Scheduled Meds:   albumin human  25 g IntraVENous Q6H    senna  2 tablet Oral Nightly    naloxegol  12.5 mg Oral QAM AC    polyethylene glycol  17 g Oral BID    magnesium oxide  400 mg Oral BID    potassium chloride  20 mEq Oral Daily    insulin glargine  50 Units SubCUTAneous Nightly    insulin lispro  0-16 Units SubCUTAneous TID WC    insulin lispro  0-4 Units SubCUTAneous Nightly    insulin lispro  5 Units SubCUTAneous TID WC    sodium chloride flush  5-40 mL IntraVENous 2 times per day    enoxaparin  30 mg SubCUTAneous BID    allopurinol  300 mg Oral Daily    atorvastatin  80 mg Oral Nightly    carvedilol  3.125 mg Oral BID WC    ferrous sulfate  325 mg Oral BID    pantoprazole  40 mg Oral QAM AC    pregabalin  25 mg Oral BID    tamsulosin  0.4 mg Oral Daily    fluticasone  1 spray Each Nostril Daily    midodrine  5 mg Oral TID WC     Continuous Infusions:   sodium chloride      [Held by provider] furosemide (LASIX) 1mg/ml infusion Stopped (08/29/22 1721)    dextrose       PRN Meds:.oxyCODONE-acetaminophen, sodium chloride, cyclobenzaprine, sodium chloride flush, sodium chloride, polyethylene glycol, acetaminophen **OR** acetaminophen, glucose, dextrose bolus **OR** dextrose bolus, glucagon (rDNA), dextrose, prochlorperazine    Objective/     Vitals:    08/30/22 0154 08/30/22 0208 08/30/22 0455 08/30/22 0609   BP:   114/66    Pulse:   86    Resp:  18 18 16   Temp:   97.6 °F (36.4 °C)    TempSrc:   Oral    SpO2:   95%    Weight: 277 lb 3.2 oz (125.7 kg)      Height:         24HR INTAKE/OUTPUT:    Intake/Output Summary (Last 24 hours) at 8/30/2022 0813  Last data filed at 8/30/2022 0718  Gross per 24 hour   Intake 900 ml   Output 3050 ml   Net -2150 ml     Gen: alert, awake  Neck: No JVD  Skin: Unremarkable  Cardiovascular:  S1, S2 without m/r/g   Respiratory: CTA B without w/r/r; respiratory effort normal  Abdomen:  soft, nt, nd,   Extremities: 1+ lower extremity edema  Neuro/Psy: AAoriented times 3 ; moves all 4 ext    Data/  Recent Labs     08/29/22  0656 08/30/22  0733   WBC 4.3 4.7   HGB 10.6* 9.9*   HCT 33.1* 31.4*   MCV 83.1 83.2    140     Recent Labs     08/28/22  0600 08/29/22  0656 08/30/22  0733    139 137   K 3.4* 3.6 4.0   CL 92* 92* 93*   CO2 38* 35* 35*   GLUCOSE 129* 213* 233*   MG 2.30 2.30  --    * 104* 96*   CREATININE 1.8* 1.7* 1.7*   LABGLOM 38* 40* 40*   GFRAA 46* 49* 49*       Assessment/   -MARGARITO  from cardio-renal syndrome leading to venous congestion in the setting of diastolic heart failure decompensation  -CKD III baseline creatinine of 1.5 to 1.7  -Hypokalemia  -HTN  -Anemia  - Ascites from cardio renal syndrome/cardiac cirrhosis; CT scan from 8/24/22 w hepatosplenomegaly noted but no evidence of cirrhosis commented    Plan/   -iv lasix 40 mg BID, can transition to torsemide home dose in next 24h  -complete iv albumin   -cont kcl maintenance of 20 meq daily   -Serial renal panel  -daily wts and strict i/o  -renal dose medications   -avoid nephrotoxins    Overall prognosis is poor    David Meadows MD  Office: [phone]  Fax:    Koby Perez [employer] [place]

## 2022-08-30 NOTE — PLAN OF CARE
Problem: Pain  Goal: Verbalizes/displays adequate comfort level or baseline comfort level  Outcome: Progressing  Note: Pt will be satisfied with pain control. Pt uses numeric pain rating scale with reassessments after pain med administration. Will continue to monitor progression throughout shift. Problem: Safety - Adult  Goal: Free from fall injury  Problem: Chronic Conditions and Co-morbidities  Goal: Patient's chronic conditions and co-morbidity symptoms are monitored and maintained or improvedOutcome: Progressing  Note: Pt high fall risk. Instructed to use call light before getting out of bed. Call light within reach. Bed in low position. Bed alarm on. Will continue to monitor.

## 2022-08-30 NOTE — PROGRESS NOTES
Occupational Therapy  Facility/Department: [facility] A2 CARD TELEMETRY  Daily Treatment Note  NAME: Negrita Greene  : 1955  MRN: [id number]    Date of Service: 2022    Discharge Recommendations:  [address] [place] [address] with OT  OT Equipment Recommendations  Equipment Needed: No      Patient Diagnosis(es): There were no encounter diagnoses. Assessment    Assessment: Negrita Greene is progressing in therapy steadily, currently limited by generalized deconditioning. Demo'd fair safety awareness, requiring cues for safety with O2 lines and demo'd lack of insight as evidenced by dumping water on floor during sponge bathing at sink. Functional Mobility: CGA-SBA with RW for chair to/from bathroom and on/off BSC at sink during grooming  ADL: A with grooming/sponge bathing at sink; A with threading pants and dep for socks/BLE wraps  Pt with wounds to L great toe and 2nd R toe, RN notified, pt left with feet elevated, cleansed and unwrapped. Activity was tolerated fairly well, pt required increased time to complete activities and inc recovery time. Continue OT POC to address performance deficits in ADLs and functional mobility. AM-PAC Score  AM-PAC Inpatient Daily Activity Raw Score: 16 (22 132)  AM-PAC Inpatient ADL T-Scale Score : 35.96 (22)  ADL Inpatient CMS 0-100% Score: 53.32 (22)  ADL Inpatient CMS G-Code Modifier : CK (22)     Activity Tolerance: Patient limited by fatigue;Patient limited by endurance  Discharge Recommendations: [address] [facility] with OT  Equipment Needed: No      Plan   Plan  Times per Week: 3-4x/wk  Current Treatment Recommendations: Balance training;Functional mobility training; Endurance training;Equipment evaluation, education, & procurement;Patient/Caregiver education & training; Safety education & training;Self-Care / ADL     Restrictions  Restrictions/Precautions  Restrictions/Precautions: General Precautions; Fall Risk  Position Activity Restriction  Other position/activity restrictions: up with assist, tele, 1500 ml fluid restriction, maintain heels off bed    Subjective   Subjective  Subjective: pt seated in chair at arrival, requesting to sponge bathe at sink; RN approved  Pain: denied acute pain, admitted to chronic pain but did not rate  Orientation  Overall Orientation Status: Within Functional Limits  Orientation Level: Oriented X4  Cognition  Overall Cognitive Status: Exceptions  Arousal/Alertness: Appropriate responses to stimuli  Following Commands: Follows all commands without difficulty  Attention Span: Attends with cues to redirect  Memory: Appears intact  Safety Judgement: Decreased awareness of need for safety;Decreased awareness of need for assistance  Problem Solving: Assistance required to generate solutions  Insights: Decreased awareness of deficits  Initiation: Does not require cues  Sequencing: Does not require cues  Cognition Comment: pt very talkative and required cues to redirect        Objective    Vitals: BP: 113/59, SPO2 93% on 4L, HR 88     Bed Mobility Training  Bed Mobility Training: No  Transfer Training  Transfer Training: Yes  Interventions: Verbal cues  Sit to Stand: Contact-guard assistance  Stand to Sit: Stand-by assistance  Toilet Transfer: Stand-by assistance (in stance at toilet)       ADL  Grooming: Contact guard assistance  Grooming Skilled Clinical Factors: in stance at sink for face washing  UE Bathing:  Moderate assistance  UE Bathing Skilled Clinical Factors: A with drying/set up/posterior torso washing  LE Bathing: Contact guard assistance  LE Bathing Skilled Clinical Factors: seated on BSC for sponge bathing with set up  UE Dressing: Minimal assistance  UE Dressing Skilled Clinical Factors: donning personal undershirt and t shirt; pt with increased time and ultimate failure to orient clothing properly d/t low vision  LE Dressing: Dependent/Total  LE Dressing Skilled Clinical Factors: threading shorts

## 2022-08-31 LAB
ANION GAP SERPL CALCULATED.3IONS-SCNC: 10 MMOL/L (ref 3–16)
BASOPHILS ABSOLUTE: 0 K/UL (ref 0–0.2)
BASOPHILS RELATIVE PERCENT: 0.6 %
BUN BLDV-MCNC: 95 MG/DL (ref 7–20)
CALCIUM SERPL-MCNC: 9.5 MG/DL (ref 8.3–10.6)
CHLORIDE BLD-SCNC: 92 MMOL/L (ref 99–110)
CO2: 33 MMOL/L (ref 21–32)
CREAT SERPL-MCNC: 1.8 MG/DL (ref 0.8–1.3)
EOSINOPHILS ABSOLUTE: 0.2 K/UL (ref 0–0.6)
EOSINOPHILS RELATIVE PERCENT: 3 %
GFR AFRICAN AMERICAN: 46
GFR NON-AFRICAN AMERICAN: 38
GLUCOSE BLD-MCNC: 179 MG/DL (ref 70–99)
GLUCOSE BLD-MCNC: 192 MG/DL (ref 70–99)
GLUCOSE BLD-MCNC: 194 MG/DL (ref 70–99)
GLUCOSE BLD-MCNC: 195 MG/DL (ref 70–99)
GLUCOSE BLD-MCNC: 216 MG/DL (ref 70–99)
HCT VFR BLD CALC: 29.1 % (ref 40.5–52.5)
HEMOGLOBIN: 9.4 G/DL (ref 13.5–17.5)
LYMPHOCYTES ABSOLUTE: 0.5 K/UL (ref 1–5.1)
LYMPHOCYTES RELATIVE PERCENT: 9 %
MCH RBC QN AUTO: 26.8 PG (ref 26–34)
MCHC RBC AUTO-ENTMCNC: 32.5 G/DL (ref 31–36)
MCV RBC AUTO: 82.5 FL (ref 80–100)
MONOCYTES ABSOLUTE: 0.4 K/UL (ref 0–1.3)
MONOCYTES RELATIVE PERCENT: 7.6 %
NEUTROPHILS ABSOLUTE: 4.3 K/UL (ref 1.7–7.7)
NEUTROPHILS RELATIVE PERCENT: 79.8 %
PDW BLD-RTO: 19 % (ref 12.4–15.4)
PERFORMED ON: ABNORMAL
PLATELET # BLD: 133 K/UL (ref 135–450)
PMV BLD AUTO: 8.9 FL (ref 5–10.5)
POTASSIUM SERPL-SCNC: 3.6 MMOL/L (ref 3.5–5.1)
RBC # BLD: 3.52 M/UL (ref 4.2–5.9)
SODIUM BLD-SCNC: 135 MMOL/L (ref 136–145)
WBC # BLD: 5.3 K/UL (ref 4–11)

## 2022-08-31 PROCEDURE — 6370000000 HC RX 637 (ALT 250 FOR IP): Performed by: INTERNAL MEDICINE

## 2022-08-31 PROCEDURE — 6360000002 HC RX W HCPCS: Performed by: INTERNAL MEDICINE

## 2022-08-31 PROCEDURE — 2580000003 HC RX 258

## 2022-08-31 PROCEDURE — 97530 THERAPEUTIC ACTIVITIES: CPT

## 2022-08-31 PROCEDURE — 36415 COLL VENOUS BLD VENIPUNCTURE: CPT

## 2022-08-31 PROCEDURE — 80048 BASIC METABOLIC PNL TOTAL CA: CPT

## 2022-08-31 PROCEDURE — 6370000000 HC RX 637 (ALT 250 FOR IP)

## 2022-08-31 PROCEDURE — 2060000000 HC ICU INTERMEDIATE R&B

## 2022-08-31 PROCEDURE — 97116 GAIT TRAINING THERAPY: CPT

## 2022-08-31 PROCEDURE — 85025 COMPLETE CBC W/AUTO DIFF WBC: CPT

## 2022-08-31 PROCEDURE — 6360000002 HC RX W HCPCS

## 2022-08-31 RX ORDER — SPIRONOLACTONE 25 MG/1
25 TABLET ORAL DAILY
Status: DISCONTINUED | OUTPATIENT
Start: 2022-08-31 | End: 2022-09-01 | Stop reason: HOSPADM

## 2022-08-31 RX ORDER — TORSEMIDE 100 MG/1
100 TABLET ORAL 2 TIMES DAILY
Status: DISCONTINUED | OUTPATIENT
Start: 2022-08-31 | End: 2022-09-01 | Stop reason: HOSPADM

## 2022-08-31 RX ADMIN — POTASSIUM CHLORIDE 20 MEQ: 1500 TABLET, EXTENDED RELEASE ORAL at 09:15

## 2022-08-31 RX ADMIN — TAMSULOSIN HYDROCHLORIDE 0.4 MG: 0.4 CAPSULE ORAL at 03:54

## 2022-08-31 RX ADMIN — INSULIN LISPRO 5 UNITS: 100 INJECTION, SOLUTION INTRAVENOUS; SUBCUTANEOUS at 18:43

## 2022-08-31 RX ADMIN — ENOXAPARIN SODIUM 30 MG: 100 INJECTION SUBCUTANEOUS at 09:16

## 2022-08-31 RX ADMIN — ATORVASTATIN CALCIUM 80 MG: 80 TABLET, FILM COATED ORAL at 20:14

## 2022-08-31 RX ADMIN — CYCLOBENZAPRINE 5 MG: 10 TABLET, FILM COATED ORAL at 20:15

## 2022-08-31 RX ADMIN — SODIUM CHLORIDE, PRESERVATIVE FREE 10 ML: 5 INJECTION INTRAVENOUS at 09:17

## 2022-08-31 RX ADMIN — MIDODRINE HYDROCHLORIDE 5 MG: 5 TABLET ORAL at 09:16

## 2022-08-31 RX ADMIN — SPIRONOLACTONE 25 MG: 25 TABLET ORAL at 13:42

## 2022-08-31 RX ADMIN — PANTOPRAZOLE SODIUM 40 MG: 40 TABLET, DELAYED RELEASE ORAL at 03:55

## 2022-08-31 RX ADMIN — NALOXEGOL OXALATE 12.5 MG: 12.5 TABLET, FILM COATED ORAL at 05:34

## 2022-08-31 RX ADMIN — SENNOSIDES 17.2 MG: 8.6 TABLET, COATED ORAL at 20:15

## 2022-08-31 RX ADMIN — TORSEMIDE 100 MG: 100 TABLET ORAL at 16:12

## 2022-08-31 RX ADMIN — POLYETHYLENE GLYCOL 3350 17 G: 17 POWDER, FOR SOLUTION ORAL at 20:15

## 2022-08-31 RX ADMIN — Medication 400 MG: at 09:15

## 2022-08-31 RX ADMIN — FLUTICASONE PROPIONATE 1 SPRAY: 50 SPRAY, METERED NASAL at 09:17

## 2022-08-31 RX ADMIN — INSULIN LISPRO 5 UNITS: 100 INJECTION, SOLUTION INTRAVENOUS; SUBCUTANEOUS at 12:24

## 2022-08-31 RX ADMIN — MIDODRINE HYDROCHLORIDE 5 MG: 5 TABLET ORAL at 18:42

## 2022-08-31 RX ADMIN — OXYCODONE AND ACETAMINOPHEN 1 TABLET: 325; 5 TABLET ORAL at 20:14

## 2022-08-31 RX ADMIN — INSULIN LISPRO 5 UNITS: 100 INJECTION, SOLUTION INTRAVENOUS; SUBCUTANEOUS at 09:18

## 2022-08-31 RX ADMIN — FERROUS SULFATE TAB 325 MG (65 MG ELEMENTAL FE) 325 MG: 325 (65 FE) TAB at 09:16

## 2022-08-31 RX ADMIN — FERROUS SULFATE TAB 325 MG (65 MG ELEMENTAL FE) 325 MG: 325 (65 FE) TAB at 20:15

## 2022-08-31 RX ADMIN — ALLOPURINOL 300 MG: 300 TABLET ORAL at 09:16

## 2022-08-31 RX ADMIN — MIDODRINE HYDROCHLORIDE 5 MG: 5 TABLET ORAL at 12:24

## 2022-08-31 RX ADMIN — FUROSEMIDE 40 MG: 10 INJECTION, SOLUTION INTRAMUSCULAR; INTRAVENOUS at 09:16

## 2022-08-31 RX ADMIN — ENOXAPARIN SODIUM 30 MG: 100 INJECTION SUBCUTANEOUS at 20:15

## 2022-08-31 RX ADMIN — Medication 400 MG: at 20:14

## 2022-08-31 RX ADMIN — PREGABALIN 25 MG: 25 CAPSULE ORAL at 20:15

## 2022-08-31 RX ADMIN — SODIUM CHLORIDE, PRESERVATIVE FREE 10 ML: 5 INJECTION INTRAVENOUS at 21:38

## 2022-08-31 RX ADMIN — PREGABALIN 25 MG: 25 CAPSULE ORAL at 09:15

## 2022-08-31 RX ADMIN — OXYCODONE AND ACETAMINOPHEN 1 TABLET: 325; 5 TABLET ORAL at 03:53

## 2022-08-31 RX ADMIN — INSULIN GLARGINE 50 UNITS: 100 INJECTION, SOLUTION SUBCUTANEOUS at 20:18

## 2022-08-31 RX ADMIN — OXYCODONE AND ACETAMINOPHEN 1 TABLET: 325; 5 TABLET ORAL at 09:15

## 2022-08-31 ASSESSMENT — PAIN SCALES - GENERAL
PAINLEVEL_OUTOF10: 7
PAINLEVEL_OUTOF10: 7
PAINLEVEL_OUTOF10: 4
PAINLEVEL_OUTOF10: 7
PAINLEVEL_OUTOF10: 5

## 2022-08-31 ASSESSMENT — PAIN DESCRIPTION - LOCATION
LOCATION: GENERALIZED
LOCATION: GENERALIZED
LOCATION: NECK

## 2022-08-31 ASSESSMENT — PAIN SCALES - WONG BAKER: WONGBAKER_NUMERICALRESPONSE: 0

## 2022-08-31 ASSESSMENT — PAIN DESCRIPTION - DESCRIPTORS
DESCRIPTORS: ACHING
DESCRIPTORS: ACHING

## 2022-08-31 ASSESSMENT — PAIN DESCRIPTION - FREQUENCY: FREQUENCY: CONTINUOUS

## 2022-08-31 ASSESSMENT — PAIN - FUNCTIONAL ASSESSMENT: PAIN_FUNCTIONAL_ASSESSMENT: PREVENTS OR INTERFERES SOME ACTIVE ACTIVITIES AND ADLS

## 2022-08-31 ASSESSMENT — PAIN DESCRIPTION - ONSET: ONSET: ON-GOING

## 2022-08-31 ASSESSMENT — PAIN DESCRIPTION - PAIN TYPE: TYPE: CHRONIC PAIN

## 2022-08-31 NOTE — PROGRESS NOTES
Physical Therapy  Facility/Department: [facility] A2 CARD TELEMETRY  Daily Treatment Note  NAME: Sabine Session  : 1955  MRN: [id number]    Date of Service: 2022    Discharge Recommendations:  [address] with PT   PT Equipment Recommendations  Equipment Needed: Yes  Mobility Devices: Adilene Inches: Rolling    Patient Diagnosis(es): There were no encounter diagnoses. Assessment   Assessment: Pt tolerated therapy well this date. Grossly SBA for mobility with RW and able to increase gait training distance this date. Reviewed HF education and pt negotiated scale today. Pt would benefit from continued skilled therapy to address deficits. Continue to recommend LTC with PT at d/c. Activity Tolerance: Patient limited by fatigue;Patient limited by endurance; Patient tolerated treatment well  Equipment Needed: Yes  Mobility Devices: [employer]: 3-5 times per week  Current Treatment Recommendations: Strengthening;Balance training;Functional mobility training;Transfer training;Gait training; Endurance training;Patient/Caregiver education & training; Safety education & training;Home exercise program;Therapeutic activities; Equipment evaluation, education, & procurement; Neuromuscular re-education     Restrictions  Restrictions/Precautions  Restrictions/Precautions: General Precautions, Fall Risk  Position Activity Restriction  Other position/activity restrictions: up with assist, tele, 1500 ml fluid restriction, maintain heels off bed     Subjective    Subjective  Subjective: Pt agreeable to therapy  Pain: Denies pain     Objective   Vitals  Heart Rate: 99  BP: 116/72  MAP (Calculated): 86.67  SpO2: 95 %  O2 Device: Nasal cannula  Comment: 4L    Bed Mobility Training  Bed Mobility Training: No (pt up in chair at start and end of session)    Balance  Sitting: Intact  Standing: With support (RW; difficulty balancing on scale d/t narrow CHAPARRITA without UEs.  Pt states his scale at home is wider)    Transfer Training  Transfer Training: Yes  Overall Level of Assistance: Stand-by assistance  Interventions: Safety awareness training;Verbal cues  Sit to Stand: Stand-by assistance  Stand to Sit: Stand-by assistance    Gait Training  Gait Training: Yes  Gait  Overall Level of Assistance: Stand-by assistance; Adaptive equipment; Additional time  Interventions: Verbal cues  Base of Support: Widened  Speed/Chrissy: Pace decreased (< 100 feet/min); Shuffled; Slow  Step Length: Left shortened;Right shortened  Gait Abnormalities: Decreased step clearance; Step to gait  Distance (ft): 100 Feet  Assistive Device: Walker, rolling;Gait belt    Stairs - Level of Assistance:  (pt negotiated scale x 2 reps with RW and CGA. Cues for safety and technique)           Safety Devices  Type of Devices: All fall risk precautions in place;Call light within reach; Chair alarm in place;Gait belt;Nurse notified; Left in chair       Goals  Short Term Goals  Time Frame for Short term goals: 8/30/22 -- GOALs extended to 9/6 d/t prolonged hospital stay  Short term goal 1: Pt will complete bed mobility mod I. - PROGRESSING 8/31  Short term goal 2: Pt will complete transfers with walker mod I. - PROGRESSING 8/31  Short term goal 3: Pt will ambulate 50 ft with walker mod I. - PROGRESSING 8/26  Short term goal 4: Pt will tolerate 12-15 reps of LE Exercises to progress  strength and endurance by 8/31. - PROGRESSING 8/31 focused on functional mobility and scale negotiation this date. Short term goal 5: Pt will meet 4/4 HF education goals. -- GOAL MET 8/26  Patient Goals   Patient goals : Pt does not indicate.     Education  Patient Education  Education Given To: Patient  Education Provided: Role of Therapy;Transfer Training;Plan of Care;Energy Conservation  Education Provided Comments: HF education reviewed  Education Method: Verbal;Demonstration  Barriers to Learning: None  Education Outcome: Verbalized understanding    [facility] 6 Clicks Inpatient Mobility:  AM-PAC Mobility Inpatient   How much difficulty turning over in bed?: A Little  How much difficulty sitting down on / standing up from a chair with arms?: A Little  How much difficulty moving from lying on back to sitting on side of bed?: A Little  How much help from another person moving to and from a bed to a chair?: A Little  How much help from another person needed to walk in hospital room?: A Little  How much help from another person for climbing 3-5 steps with a railing?: A Little  AM-[facility] Inpatient Mobility Raw Score : 18  AM-PAC Inpatient T-Scale Score : 43.63  Mobility Inpatient CMS 0-100% Score: 46.58  Mobility Inpatient CMS G-Code Modifier : CK    Therapy Time   Individual Concurrent Group Co-treatment   Time In 0836         Time Out 0916         Minutes 40         Timed Code Treatment Minutes: 36 Minutes       Nannette Posey, PT, DPT  If pt is unable to be seen after this session, please let this note serve as discharge summary. Please see case management note for discharge disposition. Thank you.

## 2022-08-31 NOTE — PROGRESS NOTES
Hospitalist Progress Note      PCP: No primary care provider on file. Date of Admission: 8/24/2022    Chief Complaint:   Weight gain, shortness of breath    Hospital Course:   75 y.o. male with past medical history significant for CHF, CAD, CKD, DM2 with peripheral neuropathy, hypertension, hyperlipidemia, morbid obesity, constipation, chronic pain, CAD s/p CABG. He was in the ER 8/18 for similar complaints and left AMA due to wait time (he sated 32 hours). He went to his cardiologist appointment today for worsening BLE edema, dyspnea, and fluid in his abdomen. He was direct admitted from there in order to by-pass the ED. He reports a 20lb weight gain. He denies denies chest pain, n/v/d, dysuria, fever, chills, and headache; reports neuropathy, and constipation. There is no radiation of symptoms. No aggravating or alleviating factors. Subjective:   Seen resting in chair at bedside. States he feels well today. Denies shortness of breath or chest pain. Denies any pain at this time. No new complaints. Afebrile. Vital signs stable. On 4 L O2 via nasal cannula.       Medications:  Reviewed    Infusion Medications    sodium chloride      dextrose       Scheduled Medications    torsemide  100 mg Oral BID    spironolactone  25 mg Oral Daily    senna  2 tablet Oral Nightly    naloxegol  12.5 mg Oral QAM AC    polyethylene glycol  17 g Oral BID    magnesium oxide  400 mg Oral BID    potassium chloride  20 mEq Oral Daily    insulin glargine  50 Units SubCUTAneous Nightly    insulin lispro  0-16 Units SubCUTAneous TID WC    insulin lispro  0-4 Units SubCUTAneous Nightly    insulin lispro  5 Units SubCUTAneous TID     sodium chloride flush  5-40 mL IntraVENous 2 times per day    enoxaparin  30 mg SubCUTAneous BID    allopurinol  300 mg Oral Daily    atorvastatin  80 mg Oral Nightly    carvedilol  3.125 mg Oral BID WC    ferrous sulfate  325 mg Oral BID    pantoprazole  40 mg Oral QAM AC    pregabalin  25 mg Oral BID    tamsulosin  0.4 mg Oral Daily    fluticasone  1 spray Each Nostril Daily    midodrine  5 mg Oral TID      PRN Meds: oxyCODONE-acetaminophen, sodium chloride, cyclobenzaprine, sodium chloride flush, sodium chloride, polyethylene glycol, acetaminophen **OR** acetaminophen, glucose, dextrose bolus **OR** dextrose bolus, glucagon (rDNA), dextrose, prochlorperazine      Intake/Output Summary (Last 24 hours) at 8/31/2022 1819  Last data filed at 8/31/2022 1805  Gross per 24 hour   Intake 1301 ml   Output 1000 ml   Net 301 ml       Physical Exam Performed:    BP (!) 95/52   Pulse 96   Temp 98.2 °F (36.8 °C) (Oral)   Resp 18   Ht 5' 9\" (1.753 m)   Wt 279 lb 14.4 oz (127 kg)   SpO2 96%   BMI 41.33 kg/m²     General appearance: No apparent distress, appears stated age and cooperative. HEENT: Pupils equal, round, and reactive to light. Conjunctivae/corneas clear. Neck: Supple, with full range of motion. No jugular venous distention. Trachea midline. Respiratory: 4 L O2 via nasal cannula. Normal respiratory effort. Clear to auscultation, bilaterally without Rales/Wheezes/Rhonchi. Cardiovascular: Regular rate and rhythm with normal S1/S2 without murmurs, rubs or gallops. Abdomen: Moderately distended. Otherwise soft, nontender with normal bowel sounds. Musculoskeletal: Bilateral lower extremity 3+ edema. No clubbing, cyanosis or edema bilaterally. Full range of motion without deformity. Skin: Skin color, texture, turgor normal.  No rashes or lesions. Neurologic:  Neurovascularly intact without any focal sensory/motor deficits.  Cranial nerves: II-XII intact, grossly non-focal.  Psychiatric: Alert and oriented, thought content appropriate, normal insight  Capillary Refill: Brisk,3 seconds, normal   Peripheral Pulses: +2 palpable, equal bilaterally       Labs:   Recent Labs     08/29/22  0656 08/30/22  0733 08/31/22  0707   WBC 4.3 4.7 5.3   HGB 10.6* 9.9* 9.4*   HCT 33.1* 31.4* 29.1*    140 133*     Recent Labs     08/29/22  0656 08/30/22  0733 08/31/22  0707    137 135*   K 3.6 4.0 3.6   CL 92* 93* 92*   CO2 35* 35* 33*   * 96* 95*   CREATININE 1.7* 1.7* 1.8*   CALCIUM 9.8 9.8 9.5     No results for input(s): AST, ALT, BILIDIR, BILITOT, ALKPHOS in the last 72 hours. Recent Labs     08/29/22  0656 08/29/22  0958   INR 1.31* 1.40*     No results for input(s): Patrisha Meigs in the last 72 hours. Urinalysis:      Lab Results   Component Value Date/Time    NITRU Negative 05/01/2022 09:53 PM    WBCUA 0-2 11/05/2013 10:31 AM    BACTERIA Rare 11/05/2013 10:31 AM    RBCUA 0-2 11/05/2013 10:31 AM    BLOODU Negative 05/01/2022 09:53 PM    SPECGRAV 1.010 05/01/2022 09:53 PM    GLUCOSEU Negative 05/01/2022 09:53 PM    GLUCOSEU NEGATIVE 02/29/2012 03:17 PM       Radiology:  US GUIDED PARACENTESIS   Final Result   Successful paracentesis. CT ABDOMEN PELVIS WO CONTRAST Additional Contrast? None   Final Result   1. Hepatosplenomegaly with large amount of ascites. 2. Sigmoid diverticulosis. 3. Cardiomegaly with pulmonary edema in the lung bases consistent with   congestive heart failure. 4. 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 5. Anasarca. 6. Gynecomastia. XR CHEST PORTABLE   Final Result   Worsening compared to the previous evaluation from August 18, 2022 with more   pronounced pulmonary edema related to fluid overload/CHF. Small right   effusion. Right lower lobe atelectasis/consolidation.              Consults  IP CONSULT TO HEART FAILURE NURSE/COORDINATOR  IP CONSULT TO DIETITIAN  IP CONSULT TO NEPHROLOGY  IP CONSULT TO CARDIOLOGY  IP CONSULT TO SOCIAL WORK  IP CONSULT TO PALLIATIVE CARE      Assessment/Plan:    Active Hospital Problems    Diagnosis     MARGARITO (acute kidney injury) ([facility] [N17.9]      Priority: Medium    Fluid overload [E87.70]      Priority: Medium    Obesity, Class III, BMI 40-49.9 (morbid obesity) ([facility] [E66.01]     Chronic diastolic heart failure (HCC) [I50.32]     CKD (chronic kidney disease) stage 3, GFR 30-59 ml/min ([facility]) [N18.30]     Essential hypertension [I10]      Acute on chronic diastolic heart failure  -Echo: EF 50-55%; G3 DD; flattened interventricular septum, c/w R sided overload; severe pHTN  -Daily weights.  On admission 137.1kg, down to 133.8kg  -Strict I&O  -No ACE d/t renal disease  -Continue carvedilol  -BNP on admission 6892  -CHF nurse consulted  -Cardiac diet w/ 1500mL fluid restriction  -Cardiology consulted, appreciate recommendations  -Transition to torsemide 100 mg twice daily, spironolactone 25 mg daily, metolazone 5 mg 3 times weekly per nephrology     Acute kidney injury on CKD stage 3b  -Baseline creat 1.5-1.7  -Likely due to venous congestion 2/2 heart failure  -Avoid nephrotoxics as able  -Nephrology following, appreciate recommendations     Ascites  -CT abd/pelvis: hepatosplenomegaly, large amount of ascites; cardiomegaly w/ pulm edema  -Paracentesis on 8/29/2022 with removal of 8 L with fluid studies nonacute  -Continue diuresis per renal  -Likely due to cardio-renal syndrome     Azotemia  - on admission  -Nephro is following  -Monitor     Hypokalemia/magnesemia  -Monitor and replete as needed  -Monitor on telemetry  -Will follow K closely while on lasix      Hyponatremia, mild  -Stable  -Monitor     Diabetes type II, uncontrolled, with peripheral neuropathy  -A1C 8.9 this admission  -Basal bolus dose, prandial and sliding scale insulin, monitor and adjust as needed  -Carb control diet  -Continue home dose lyrica     Chronic constipation  -Daily miralax, senna  -Relistor x1 on 8/26     History of hypertension  -Borderline hypotension on admission with SBP 91  -Per renal, ideally keep SBP >120  -Continue midodrine  -Monitor     Hyperlipidemia, controlled  -LDL 65 (4/2022)  -Continue high-intensity statin therapy     Chronic hypoxic respiratory failure  -Wears 4L at baseline  -Stable on this at this time  -Monitor     Chronic pain  -Continue home regimen     Goals of care  -Discussed code status with pt, as during previous admission he was DNR-CCA. At this time, pt chose to allow for compressions, medications, and defibrillation. But does not want intubated. Order placed for Limited code with no intubation checked. Due to overall poor prognosis palliative care consult placed. Morbid obesity  -Body mass index is 26.00 kg/m².   -Complicating assessment and treatment. Placing patient at risk for multiple co-morbidities as well as early death and contributing to the patient's presentation  -Counseled on weight loss. DVT Prophylaxis: Lovenox  Diet: ADULT DIET; Regular; 4 carb choices (60 gm/meal);  Low Sodium (2 gm); 1500 ml  Code Status: Limited    PT/OT Eval Status: Long-term care with PT/OT    Dispo -1 to 2 days pending clinical course, palliative care discussion    EYAD Martinez

## 2022-08-31 NOTE — PROGRESS NOTES
Pt A/O. VSS. Assessment complete as charted. Repositioned for comfort. Call light in reach. Denies needs. Will continue to monitor.

## 2022-08-31 NOTE — PROGRESS NOTES
Nutrition Assessment     Type and Reason for Visit: Initial, RD Nutrition Re-Screen/LOS    Nutrition Recommendations/Plan:   Continue carb control, 2 gm sodium diet with 1500 mL FR   Note for pt to receive pepper and herb seasoning with meals   Monitor further diet education needs   Monitor nutrition adequacy, pertinent labs, bowel habits, wt changes, and clinical progress     Malnutrition Assessment:  Malnutrition Status: No malnutrition    Nutrition Assessment:  Pt admitted with CHF. S/p paracentesis on 8/29 with 8 L removed. Pt nutritionally stable AEB good appetite and PO intakes of mainly % this admission. Encouraged continued good PO intakes. Briefly discussed low sodium diet, pt aware of restrictions. Weights trending down r/t fluid loss. No further nutrition questions or concerns at this time. Nutrition Related Findings:   -11 L per I&Os. BM x2 on 8/30. Wound Type: None    Current Nutrition Therapies:    ADULT DIET; Regular; 4 carb choices (60 gm/meal); Low Sodium (2 gm); 1500 ml    Anthropometric Measures:  Height: 5' 9\" (175.3 cm)  Current Body Wt: 279 lb (126.6 kg)   BMI: 41.2    Nutrition Diagnosis:   No nutrition diagnosis at this time     Nutrition Interventions:   Food and/or Nutrient Delivery: Continue Current Diet  Nutrition Education/Counseling: Education initiated  Coordination of Nutrition Care: Continue to monitor while inpatient  Plan of Care discussed with: Patient    Goals:     Goals: PO intake 50% or greater, prior to discharge       Nutrition Education:  Educated on CHF, 5 minutes  Learners: Patient  Readiness: Acceptance  Method: Explanation  Response: Verbalizes Understanding  Contact name and number provided.     Discharge Planning:    Continue current diet     Som Melvin MS, RD, LD  Contact: [phone]

## 2022-08-31 NOTE — PLAN OF CARE
Problem: Pain  Goal: Verbalizes/displays adequate comfort level or baseline comfort level  Outcome: Progressing     Problem: Safety - Adult  Goal: Free from fall injury  Outcome: Progressing     Problem: Chronic Conditions and Co-morbidities  Goal: Patient's chronic conditions and co-morbidity symptoms are monitored and maintained or improved  Flowsheets (Taken 8/31/2022 0131)  Care Plan - Patient's Chronic Conditions and Co-Morbidity Symptoms are Monitored and Maintained or Improved: Update acute care plan with appropriate goals if chronic or comorbid symptoms are exacerbated and prevent overall improvement and discharge     Patient's EF (Ejection Fraction) is greater than 40%    Heart Failure Medications:  Diuretics[de-identified] Furosemide    (One of the following REQUIRED for EF </= 40%/SYSTOLIC FAILURE but MAY be used in EF% >40%/DIASTOLIC FAILURE)        ACE[de-identified] None        ARB[de-identified] None         ARNI[de-identified] None    (Beta Blockers)  NON- Evidenced Based Beta Blocker (for EF% >40%/DIASTOLIC FAILURE): None    Evidenced Based Beta Blocker::(REQUIRED for EF% <40%/SYSTOLIC FAILURE) Carvedilol- Coreg  . .................................................................................................................................................. Patient's weights and intake/output reviewed: Yes    Patient's Last Weight: 277 lbs obtained by standing scale. Difference of 16 lbs less than last documented weight.       Intake/Output Summary (Last 24 hours) at 8/31/2022 0131  Last data filed at 8/30/2022 2322  Gross per 24 hour   Intake 2335 ml   Output 2200 ml   Net 135 ml       Comorbidities Reviewed Yes    Patient has a past medical history of Anemia, Angina, Arthritis, CAD (coronary artery disease), CHF (congestive heart failure) ([facility] CKD (chronic kidney disease) stage 3, GFR 30-59 ml/min ([facility]), Clostridium difficile diarrhea, Diabetes mellitus ([facility] Diabetic neuropathy ([facility] Disease of blood and blood forming organ, Dizziness, GERD (gastroesophageal reflux disease), Headache, Hearing loss, Hyperlipidemia, Hypertension, Kidney stone, Refusal of blood transfusions as patient is Restorationism, Sleep apnea, Tinnitus, Venous ulcer ([address] and Wound, open. >>For CHF and Comorbidity documentation on Education Time and Topics, please see Education Tab    Progressive Mobility Assessment:  What is this patient's Current Level of Mobility?: Ambulatory- with Assistance  How was this patient Mobilized today?: Edge of Bed, Up to Chair, Bedside Commode, and Up in Room, ambulated 5 ft                 With Whom? Nurse, PCA, PT, and OT                 Level of Difficulty/Assistance: 1x Assist     Pt up in chair at this time on  4 L O2. Pt denies shortness of breath. Pt with pitting lower extremity edema.      Patient and/or Family's stated Goal of Care this Admission: reduce shortness of breath, increase activity tolerance, better understand heart failure and disease management, be more comfortable, and reduce lower extremity edema prior to discharge      The patient will demonstrate an understanding of blood sugar control, by verbalizing,  with the goal of completion at discharge.   :

## 2022-09-01 VITALS
BODY MASS INDEX: 41.75 KG/M2 | RESPIRATION RATE: 18 BRPM | TEMPERATURE: 98.1 F | SYSTOLIC BLOOD PRESSURE: 116 MMHG | WEIGHT: 281.9 LBS | OXYGEN SATURATION: 91 % | HEART RATE: 89 BPM | HEIGHT: 69 IN | DIASTOLIC BLOOD PRESSURE: 68 MMHG

## 2022-09-01 LAB
ALBUMIN SERPL-MCNC: 4 G/DL (ref 3.4–5)
ANION GAP SERPL CALCULATED.3IONS-SCNC: 8 MMOL/L (ref 3–16)
BUN BLDV-MCNC: 97 MG/DL (ref 7–20)
CALCIUM SERPL-MCNC: 10.9 MG/DL (ref 8.3–10.6)
CHLORIDE BLD-SCNC: 90 MMOL/L (ref 99–110)
CO2: 35 MMOL/L (ref 21–32)
CREAT SERPL-MCNC: 1.9 MG/DL (ref 0.8–1.3)
GFR AFRICAN AMERICAN: 43
GFR NON-AFRICAN AMERICAN: 35
GLUCOSE BLD-MCNC: 158 MG/DL (ref 70–99)
GLUCOSE BLD-MCNC: 162 MG/DL (ref 70–99)
GLUCOSE BLD-MCNC: 173 MG/DL (ref 70–99)
GLUCOSE BLD-MCNC: 182 MG/DL (ref 70–99)
GLUCOSE BLD-MCNC: 212 MG/DL (ref 70–99)
PERFORMED ON: ABNORMAL
PHOSPHORUS: 3.4 MG/DL (ref 2.5–4.9)
POTASSIUM SERPL-SCNC: 3.9 MMOL/L (ref 3.5–5.1)
SODIUM BLD-SCNC: 133 MMOL/L (ref 136–145)

## 2022-09-01 PROCEDURE — 6360000002 HC RX W HCPCS

## 2022-09-01 PROCEDURE — 80069 RENAL FUNCTION PANEL: CPT

## 2022-09-01 PROCEDURE — 6370000000 HC RX 637 (ALT 250 FOR IP): Performed by: INTERNAL MEDICINE

## 2022-09-01 PROCEDURE — 36415 COLL VENOUS BLD VENIPUNCTURE: CPT

## 2022-09-01 PROCEDURE — 6370000000 HC RX 637 (ALT 250 FOR IP)

## 2022-09-01 PROCEDURE — 2580000003 HC RX 258

## 2022-09-01 RX ORDER — METOLAZONE 5 MG/1
TABLET ORAL
Qty: 24 TABLET | Refills: 0 | Status: SHIPPED | OUTPATIENT
Start: 2022-09-01 | End: 2022-10-26 | Stop reason: HOSPADM

## 2022-09-01 RX ORDER — LANOLIN ALCOHOL/MO/W.PET/CERES
400 CREAM (GRAM) TOPICAL 2 TIMES DAILY
Qty: 30 TABLET | Refills: 0 | Status: SHIPPED | OUTPATIENT
Start: 2022-09-01

## 2022-09-01 RX ORDER — SPIRONOLACTONE 25 MG/1
25 TABLET ORAL DAILY
Qty: 30 TABLET | Refills: 3 | Status: SHIPPED | OUTPATIENT
Start: 2022-09-02

## 2022-09-01 RX ADMIN — MIDODRINE HYDROCHLORIDE 5 MG: 5 TABLET ORAL at 11:58

## 2022-09-01 RX ADMIN — FLUTICASONE PROPIONATE 1 SPRAY: 50 SPRAY, METERED NASAL at 10:02

## 2022-09-01 RX ADMIN — FERROUS SULFATE TAB 325 MG (65 MG ELEMENTAL FE) 325 MG: 325 (65 FE) TAB at 10:02

## 2022-09-01 RX ADMIN — SPIRONOLACTONE 25 MG: 25 TABLET ORAL at 10:02

## 2022-09-01 RX ADMIN — PREGABALIN 25 MG: 25 CAPSULE ORAL at 15:42

## 2022-09-01 RX ADMIN — ALLOPURINOL 300 MG: 300 TABLET ORAL at 10:02

## 2022-09-01 RX ADMIN — TORSEMIDE 100 MG: 100 TABLET ORAL at 17:22

## 2022-09-01 RX ADMIN — OXYCODONE AND ACETAMINOPHEN 1 TABLET: 325; 5 TABLET ORAL at 02:46

## 2022-09-01 RX ADMIN — INSULIN LISPRO 5 UNITS: 100 INJECTION, SOLUTION INTRAVENOUS; SUBCUTANEOUS at 17:23

## 2022-09-01 RX ADMIN — OXYCODONE AND ACETAMINOPHEN 1 TABLET: 325; 5 TABLET ORAL at 11:58

## 2022-09-01 RX ADMIN — PANTOPRAZOLE SODIUM 40 MG: 40 TABLET, DELAYED RELEASE ORAL at 07:03

## 2022-09-01 RX ADMIN — MIDODRINE HYDROCHLORIDE 5 MG: 5 TABLET ORAL at 17:22

## 2022-09-01 RX ADMIN — POTASSIUM CHLORIDE 20 MEQ: 1500 TABLET, EXTENDED RELEASE ORAL at 10:01

## 2022-09-01 RX ADMIN — SODIUM CHLORIDE, PRESERVATIVE FREE 10 ML: 5 INJECTION INTRAVENOUS at 10:07

## 2022-09-01 RX ADMIN — ENOXAPARIN SODIUM 30 MG: 100 INJECTION SUBCUTANEOUS at 10:02

## 2022-09-01 RX ADMIN — TORSEMIDE 100 MG: 100 TABLET ORAL at 10:02

## 2022-09-01 RX ADMIN — INSULIN LISPRO 4 UNITS: 100 INJECTION, SOLUTION INTRAVENOUS; SUBCUTANEOUS at 11:58

## 2022-09-01 RX ADMIN — TAMSULOSIN HYDROCHLORIDE 0.4 MG: 0.4 CAPSULE ORAL at 07:03

## 2022-09-01 RX ADMIN — NALOXEGOL OXALATE 12.5 MG: 12.5 TABLET, FILM COATED ORAL at 07:04

## 2022-09-01 RX ADMIN — INSULIN LISPRO 5 UNITS: 100 INJECTION, SOLUTION INTRAVENOUS; SUBCUTANEOUS at 11:59

## 2022-09-01 RX ADMIN — MIDODRINE HYDROCHLORIDE 5 MG: 5 TABLET ORAL at 10:01

## 2022-09-01 RX ADMIN — INSULIN LISPRO 5 UNITS: 100 INJECTION, SOLUTION INTRAVENOUS; SUBCUTANEOUS at 10:03

## 2022-09-01 RX ADMIN — Medication 400 MG: at 10:01

## 2022-09-01 ASSESSMENT — ENCOUNTER SYMPTOMS
CHEST TIGHTNESS: 1
NAUSEA: 0
WHEEZING: 0
CONSTIPATION: 1
SORE THROAT: 0
VOICE CHANGE: 0
VOMITING: 0
DIARRHEA: 0
STRIDOR: 0
ABDOMINAL DISTENTION: 1
SHORTNESS OF BREATH: 1
FACIAL SWELLING: 0
COUGH: 0
CHOKING: 0
TROUBLE SWALLOWING: 0
ABDOMINAL PAIN: 1

## 2022-09-01 ASSESSMENT — PAIN DESCRIPTION - DESCRIPTORS: DESCRIPTORS: DISCOMFORT;ACHING;THROBBING

## 2022-09-01 ASSESSMENT — PAIN SCALES - GENERAL
PAINLEVEL_OUTOF10: 8
PAINLEVEL_OUTOF10: 4
PAINLEVEL_OUTOF10: 4
PAINLEVEL_OUTOF10: 7

## 2022-09-01 ASSESSMENT — PAIN - FUNCTIONAL ASSESSMENT: PAIN_FUNCTIONAL_ASSESSMENT: ACTIVITIES ARE NOT PREVENTED

## 2022-09-01 ASSESSMENT — PAIN DESCRIPTION - LOCATION
LOCATION: GENERALIZED;NECK
LOCATION: BACK

## 2022-09-01 ASSESSMENT — PAIN DESCRIPTION - ORIENTATION: ORIENTATION: LOWER

## 2022-09-01 NOTE — PROGRESS NOTES
Nephrology Progress Note   [email or web address]      Sub/interval history    Feels fine   Paracentesis of 8l on 8/29 s/p iv albumin  Lasix gtt turned off after para on 8/29; iv 40 mg BID on 8/30 n po torsemide from 8/31  Cr 1.8-->1.9    Last 24h uop 1.9 liters   Wt 295-->293--> 277-->279-->281 lbs ( 302 on admission)    ROS: No chest pain/fever/nausea/vomiting; remains on 4l NC  PSFH: No visitor    Scheduled Meds:   torsemide  100 mg Oral BID    spironolactone  25 mg Oral Daily    senna  2 tablet Oral Nightly    naloxegol  12.5 mg Oral QAM AC    polyethylene glycol  17 g Oral BID    magnesium oxide  400 mg Oral BID    potassium chloride  20 mEq Oral Daily    insulin glargine  50 Units SubCUTAneous Nightly    insulin lispro  0-16 Units SubCUTAneous TID WC    insulin lispro  0-4 Units SubCUTAneous Nightly    insulin lispro  5 Units SubCUTAneous TID WC    sodium chloride flush  5-40 mL IntraVENous 2 times per day    enoxaparin  30 mg SubCUTAneous BID    allopurinol  300 mg Oral Daily    atorvastatin  80 mg Oral Nightly    carvedilol  3.125 mg Oral BID WC    ferrous sulfate  325 mg Oral BID    pantoprazole  40 mg Oral QAM AC    pregabalin  25 mg Oral BID    tamsulosin  0.4 mg Oral Daily    fluticasone  1 spray Each Nostril Daily    midodrine  5 mg Oral TID WC     Continuous Infusions:   sodium chloride      dextrose       PRN Meds:.oxyCODONE-acetaminophen, sodium chloride, cyclobenzaprine, sodium chloride flush, sodium chloride, polyethylene glycol, acetaminophen **OR** acetaminophen, glucose, dextrose bolus **OR** dextrose bolus, glucagon (rDNA), dextrose, prochlorperazine    Objective/     Vitals:    09/01/22 0246 09/01/22 0316 09/01/22 0438 09/01/22 0845   BP:   113/64 114/60   Pulse:   97 91   Resp: 18 18 18 18   Temp:   97.7 °F (36.5 °C) 99.3 °F (37.4 °C)   TempSrc:   Axillary Oral   SpO2:   96% 99%   Weight:   281 lb 14.4 oz (127.9 kg)    Height:         24HR INTAKE/OUTPUT:    Intake/Output Summary (Last 24 hours) at 9/1/2022 1114  Last data filed at 9/1/2022 0246  Gross per 24 hour   Intake 521 ml   Output 1900 ml   Net -1379 ml       Gen: alert, awake  Neck: No JVD  Skin: Unremarkable  Cardiovascular:  S1, S2 without m/r/g   Respiratory: CTA B without w/r/r; respiratory effort normal  Abdomen:  soft, nt, nd,   Extremities: 1+ lower extremity edema  Neuro/Psy: AAoriented times 3 ; moves all 4 ext    Data/  Recent Labs     08/30/22  0733 08/31/22  0707   WBC 4.7 5.3   HGB 9.9* 9.4*   HCT 31.4* 29.1*   MCV 83.2 82.5    133*       Recent Labs     08/30/22  0733 08/31/22  0707 09/01/22  0820    135* 133*   K 4.0 3.6 3.9   CL 93* 92* 90*   CO2 35* 33* 35*   GLUCOSE 233* 195* 162*   PHOS  --   --  3.4   BUN 96* 95* 97*   CREATININE 1.7* 1.8* 1.9*   LABGLOM 40* 38* 35*   GFRAA 49* 46* 43*         Assessment/   -MARGARITO  from cardio-renal syndrome leading to venous congestion in the setting of diastolic heart failure decompensation  -CKD III baseline creatinine of 1.5 to 1.7; new baseline 1.7-2.1  -Hypokalemia  -HTN  -Anemia  - Ascites from cardio renal syndrome/cardiac cirrhosis; CT scan from 8/24/22 w hepatosplenomegaly noted but no evidence of cirrhosis commented  -Hypotension   Stable on midodrine    Plan/   -torsemide 100 mg BID, spironolactone 25 mg daily and metolazone 5 mg three times a week for wt gain >5 lbs in 24h (from 8/31/22)  -follow palliative care recs  -strict 2 gm sodium diet and 1500 ml /day  -cont kcl maintenance of 20 meq daily   -Serial renal panel  -daily wts and strict i/o  -renal dose medications   -avoid nephrotoxins    Overall prognosis is poor; goals of care discussion with palliative approach is reasonable    Landen Loera MD  Office: [phone]  Fax:    [email or web address] [email or web address]

## 2022-09-01 NOTE — CARE COORDINATION
CASE MANAGEMENT DISCHARGE SUMMARY      Discharge to: [address]  completed: [address] Exemption Notification (HENS) completed: N/A    IMM given: (date) 9/1/22      Transportation:     Agency used: [facility] up time:1900   Ambulance form completed: Yes    Confirmed discharge plan with: RN & Leopold Fritz @ [facility]     Patient: yes     Family:  patient wants to inform family     Facility/Agency, name:  [facility] faxed   Phone number for report to facility: [phone]     RN, name: Mari Horta    Note: Discharging nurse to complete [facility], reconcile [facility], and place final copy with patient's discharge packet. RN to ensure that written prescriptions for  Level II medications are sent with patient to the facility as per protocol.

## 2022-09-01 NOTE — CONSULTS
PALLIATIVE MEDICINE CONSULTATION     Patient name:Shay Zarco   [facility]:[id number]    IEE:[id number]  Room/Bed:0207/0207-01   LOS: 8 days         Date of consult:9/1/2022    Consult Information  Palliative Medicine Consult performed by: PATRICK Perez CNP      Inpatient consult to Palliative Care  Consult performed by: PATRICK Davey CNP  Consult ordered by: EYAD Martinez  Reason for consult: goals of care, family support    Number of admissions past 12 months: 8      ASSESSMENT/RECOMMENDATIONS     75 y.o. male with CHF, CAD, CKD III admitted with cardio renal syndrome      Symptom Management:  Goals of Care - Extensive GOC conversation with patient today. He feels he has no quality of life and does not like coming into the hospital and would never agree to dialysis; however, he is also not accepting of his poor prognosis at this time and seems to lack insight into the severity of his disease. He wants to go back to [facility] and try to make some improvements in his lifestyle and diet with hopes of stabilizing his heart failure. He wants to continue with disease modifying treatments. He is interested in the idea of hospice and comfort care eventually, but does not feel this is the right time. He did agree to home palliative care for ongoing [address] discussions and symptom management. He could potentially benefit from the advanced cardiac care program through [place], but he is not interested in that at this time. Home palliative care consult has been placed. Shortness of breath - due to CRS. Had paracentesis 8/29/22 with 8 L drained and this has provided relief. Feels SOB is worse when his stomach is distended. Currently on 4L and able to maintain sats comfortably while talking. Debility - related to his heart failure, CKD, and neuropathy.   Moved to [facility] about 3 months ago because he was needing help with big ADLs and his he wished our country allowed for euthanasia because he would do it if he could. He hates coming into the hospital and feels like he is suffering. He feels like a prisoner having his bed and chair alarmed and he hates his diet and feeling short of breath. He doesn't feel like he has much quality of life and is more or less just existing in life. We talked about his option to shift his goals to comfort care. We talked about what comfort care would look like for him. He is very interested in hospice and says when its \"his time\" he thinks he will want hospice. He does not think its time for comfort care/hospice yet because he thinks there is a chance he could still show improvement if he could do better with his sodium intake and diet. I shared with him that I was concerned \"the time\" to consider comfort care might be very close, maybe even now. He was surprised and said he felt like he still had time to try to improve his health. Although he hates coming into the hospital, he is not ready to give that up. He hopes to have another paracentesis if needed. He does draw the line at dialysis and says he would never agree to dialysis based on his issues around blood transfusions. Eventually he was able to tell me that if the time between his hospitalizations continued to shrink, that would probably mean he was actually getting worse instead of staying about the same. Discussed code status. Educated patient that although in some cases it does save lives, CPR (cardiopulmonary resuscitation) frequently is not successful or does not benefit those who receive it, especially elderly people or those with serious medical conditions. Even if revived, the person can be left with painful injuries, or in a debilitated state, or with brain damage resulting from oxygen deprivation.  Resuscitation can involve such things as drugs, forcefully pressing on the chest, giving electric shocks to restart the heart or placing a tube down the nose or throat to provide artificial breathing. People with terminal illnesses or other serious health conditions may prefer not to be resuscitated. Patient tells me he would not want intubation. He does think he would still want chest compressions, defibrillation and ACLS meds. He would never want dialysis or a feeding tube. Disposition/Discharge Plan:   - pending medical course  -An outpatient [facility] referral was sent for post-discharge follow up when patient returns home    Advance Directives:  Surrogate Decision Maker: Earnest Da Silva  Code status:  Limited x1 no (no to intubation only)      Palliative Performance Scale:     [] 60%  Amb reduced; Sig dz. Can't do hobbies/housework; Intake normal or reduced, Occasional assist; LOC full/confusion   [] 50%  Mainly sit/lie; Extensive disease. Mainly assist, Intake normal or reduced; Occasional assist; LOC full/confusion   [x] 40%  Mainly in bed; Extensive disease; Mainly assist; Intake normal or reduced; Occasional assist; LOC full/confusion   [] 30%  Bed bound, Extensive disease; Total care; Intake reduced; LOC full/confusion   [] 20%  Bed bound; Extensive disease; Total care; Intake minimal; Drowsy/coma   [] 10%  Bed bound; Extensive disease; Total care; Mouth care only; Drowsy/coma   []  0%   Death      Case discussed with: patient, floor RN, case management, nephrology  Thank you for allowing us to participate in the care of this patient. HISTORY     CC: edema  HPI: The patient is a 75 y.o. male with PMHx for CHF, CAD, CKD, DM2 with peripheral neuropathy, hypertension, Morbid Obesity, chronic pain. Frequent admission for CHF, although has been out for about 3 months now. Presented to Cardiology for evaluation and referred for admission. Reports weight gain, profound swelling, mild SOB. Currently resides in an ECF. Palliative Medicine SymptomScreening/ROS:    Review of Systems   Constitutional: Negative.   Negative for is normal. No respiratory distress. Breath sounds: No stridor. No wheezing. Comments: 4 L NC. Apple to speak in long sentences comfortably   Abdominal:      Palpations: Abdomen is soft. Comments: obsese   Musculoskeletal:         General: Normal range of motion. Cervical back: Normal range of motion and neck supple. Right lower leg: Edema present. Left lower leg: Edema present. Skin:     General: Skin is warm and dry. Capillary Refill: Capillary refill takes less than 2 seconds. Neurological:      Mental Status: He is alert and oriented to person, place, and time. Mental status is at baseline. Psychiatric:         Mood and Affect: Mood normal.         Behavior: Behavior normal.         Thought Content:  Thought content normal.         Judgment: Judgment normal.      Comments:             Current labs in the epic chart reviewed as of 9/1/2022   Review of previous notes, admits, labs, radiology and testing relevant to this consult done in this chart today 9/1/2022      Total time: 122 minutes  >50% of time spent counseling patient at bedside or POA/family member if applicable , reviewing information and discussing care, coordinating with care team  Signed By: Electronically signed by PATRICK Cool CNP on 9/1/2022 at 10:04 AM  Palliative Medicine   [phone]    September 1, 2022

## 2022-09-01 NOTE — PROGRESS NOTES
Have attempted to call report to facility x2 with no response. Will fax AVS/ARASH and send along with pt. Will continue to attempt to call report to facility.

## 2022-09-19 NOTE — DISCHARGE SUMMARY
.      Hospital Medicine Discharge Summary    Patient ID: Dominic Collazo      Patient's PCP: No primary care provider on file. Admit Date: 8/24/2022     Discharge Date: 9/1/2022      Admitting Provider: Ashok Kim MD     Discharge Provider: EYAD Valles     Discharge Diagnoses: Active Hospital Problems    Diagnosis     MARGARITO (acute kidney injury) ([facility] [address] [N17.9]      Priority: Medium    Fluid overload [E87.70]      Priority: Medium    Obesity, Class III, BMI 40-49.9 (morbid obesity) ([facility]) [E66.01]     Chronic diastolic heart failure ([facility]) [I50.32]     CKD (chronic kidney disease) stage 3, GFR 30-59 ml/min ([facility]) [N18.30]     Essential hypertension [I10]        The patient was seen and examined on day of discharge and this discharge summary is in conjunction with any daily progress note from day of discharge. Hospital Course:   75 y.o. male with past medical history significant for CHF, CAD, CKD, DM2 with peripheral neuropathy, hypertension, hyperlipidemia, morbid obesity, constipation, chronic pain, CAD s/p CABG. He was in the ER 8/18 for similar complaints and left AMA due to wait time (he sated 32 hours). He went to his cardiologist appointment today for worsening BLE edema, dyspnea, and fluid in his abdomen. He was direct admitted from there in order to by-pass the ED. He reports a 20lb weight gain. He denies denies chest pain, n/v/d, dysuria, fever, chills, and headache; reports neuropathy, and constipation. There is no radiation of symptoms. No aggravating or alleviating factors.           Acute on chronic diastolic heart failure  -Echo: EF 50-55%; G3 DD; flattened interventricular septum, c/w R sided overload; severe pHTN  -No ACE d/t renal disease  -Continue carvedilol  -BNP on admission 3426  -CHF nurse consulted  -Cardiac diet w/ 1500mL fluid restriction  -Cardiology consulted during admission, maintain outpatient follow-up  -Transition to torsemide 100 mg twice daily, spironolactone 25 mg daily, metolazone 5 mg 3 times weekly per nephrology     Acute kidney injury on CKD stage 3b  -Baseline creat 1.5-1.7  -Likely due to venous congestion 2/2 heart failure  -Avoid nephrotoxics as able  -Nephrology consulted during admission     Ascites  -CT abd/pelvis: hepatosplenomegaly, large amount of ascites; cardiomegaly w/ pulm edema  -Paracentesis on 8/29/2022 with removal of 8 L with fluid studies, nonacute  -Likely due to cardio-renal syndrome     Azotemia  - on admission     Hypokalemia/magnesemia, resolved     Hyponatremia, mild  -Stable     Diabetes type II, uncontrolled, with peripheral neuropathy  -A1C 8.9 this admission  -Resume home regimen upon discharge  -Carb control diet  -Continue home dose lyrica     Chronic constipation  -Daily miralax, senna  -Relistor x1 on 8/26     History of hypertension  -Borderline hypotension on admission with SBP 91  -Per renal, ideally keep SBP >120  -Continue midodrine     Hyperlipidemia, controlled  -LDL 65 (4/2022)  -Continue high-intensity statin therapy     Chronic hypoxic respiratory failure  -Wears 4L at baseline  -Stable on this at this time  -Monitor     Chronic pain  -Continue home regimen     Goals of care  -Discussed code status with pt, as during previous admission he was DNR-CCA. At this time, pt chose to allow for compressions, medications, and defibrillation. But does not want intubated. Order placed for Limited code with no intubation checked. Due to overall poor prognosis palliative care consult placed. Morbid obesity  -Body mass index is 45.09 kg/m².   -Complicating assessment and treatment. Placing patient at risk for multiple co-morbidities as well as early death and contributing to the patient's presentation  -Counseled on weight loss.          Physical Exam Performed:     /68   Pulse 89   Temp 98.1 °F (36.7 °C)   Resp 18   Ht 5' 9\" (1.753 m)   Wt 281 lb 14.4 oz (127.9 kg)   SpO2 91%   BMI 41.63 kg/m² General appearance:  No apparent distress, appears stated age and cooperative. HEENT:  Normal cephalic, atraumatic without obvious deformity. Pupils equal, round, and reactive to light. Extra ocular muscles intact. Conjunctivae/corneas clear. Neck: Supple, with full range of motion. No jugular venous distention. Trachea midline. Respiratory:  Normal respiratory effort. Clear to auscultation, bilaterally without Rales/Wheezes/Rhonchi. Cardiovascular:  Regular rate and rhythm with normal S1/S2 without murmurs, rubs or gallops. Abdomen: Soft, non-tender, mildly distended with normal bowel sounds. Musculoskeletal: Bilateral leg edema no clubbing, Ace wraps in place, no cyanosis bilaterally. Full range of motion without deformity. Skin: Skin color, texture, turgor normal.  No rashes or lesions. Neurologic:  Neurovascularly intact without any focal sensory/motor deficits. Cranial nerves: II-XII intact, grossly non-focal.  Psychiatric:  Alert and oriented, thought content appropriate, normal insight  Capillary Refill: Brisk,< 3 seconds   Peripheral Pulses: +2 palpable, equal bilaterally       Labs: For convenience and continuity at follow-up the following most recent labs are provided:      CBC:    Lab Results   Component Value Date/Time    WBC 5.3 08/31/2022 07:07 AM    HGB 9.4 08/31/2022 07:07 AM    HCT 29.1 08/31/2022 07:07 AM     08/31/2022 07:07 AM       Renal:    Lab Results   Component Value Date/Time     09/01/2022 08:20 AM    K 3.9 09/01/2022 08:20 AM    K 3.8 05/28/2022 10:57 PM    CL 90 09/01/2022 08:20 AM    CO2 35 09/01/2022 08:20 AM    BUN 97 09/01/2022 08:20 AM    CREATININE 1.9 09/01/2022 08:20 AM    CALCIUM 10.9 09/01/2022 08:20 AM    PHOS 3.4 09/01/2022 08:20 AM         Significant Diagnostic Studies    Radiology:   US GUIDED PARACENTESIS   Final Result   Successful paracentesis. CT ABDOMEN PELVIS WO CONTRAST Additional Contrast? None   Final Result   1. Hepatosplenomegaly with large amount of ascites. 2. Sigmoid diverticulosis. 3. Cardiomegaly with pulmonary edema in the lung bases consistent with   congestive heart failure. 4. 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 5. Anasarca. 6. Gynecomastia. XR CHEST PORTABLE   Final Result   Worsening compared to the previous evaluation from August 18, 2022 with more   pronounced pulmonary edema related to fluid overload/CHF. Small right   effusion. Right lower lobe atelectasis/consolidation.                 Consults:     IP CONSULT TO HEART FAILURE NURSE/COORDINATOR  IP CONSULT TO DIETITIAN  IP CONSULT TO NEPHROLOGY  IP CONSULT TO CARDIOLOGY  IP CONSULT TO SOCIAL WORK  IP CONSULT TO PALLIATIVE CARE    Disposition:  [address]     Condition at Discharge: Stable    Discharge Instructions/Follow-up:    Follow-up with cardiology  Follow-up with PCP in 1 week  Follow-up with nephrology    Code Status:  Prior limited no x4    Activity: activity as tolerated    Diet: cardiac diet, diabetic diet, low fat, low cholesterol diet, and renal diet      Discharge Medications:     Discharge Medication List as of 9/1/2022  5:55 PM             Details   magnesium oxide (MAG-OX) 400 (240 Mg) MG tablet Take 1 tablet by mouth 2 times daily, Disp-30 tablet, R-0Normal                Details   spironolactone (ALDACTONE) 25 MG tablet Take 1 tablet by mouth daily, Disp-30 tablet, R-3Normal      metOLazone (ZAROXOLYN) 5 MG tablet 1 tab three times a week (Monday, Wednesday, and fridays), Disp-24 tablet, R-0Normal                Details   potassium chloride (KLOR-CON M) 20 MEQ extended release tablet Historical Med      meclizine (ANTIVERT) 25 MG tablet Historical Med      torsemide (DEMADEX) 100 MG tablet Take 1 tablet by mouth in the morning and at bedtime, Disp-30 tablet, R-3Adjust Sig      Sodium Phosphates (FLEET) 7-19 GM/118ML Place 1 enema rectally daily as neededHistorical Med      glucagon 1 MG injection Inject 1 kit into the muscle once Emergencies onlyHistorical Med      Glycerin, Laxative, (GLYCERIN, ADULT,) 2 g SUPP suppository Place 1 suppository rectally as neededHistorical Med      loperamide (IMODIUM) 2 MG capsule Take 2 mg by mouth as needed for DiarrheaHistorical Med      magnesium hydroxide (MILK OF MAGNESIA) 400 MG/5ML suspension Take by mouth as needed for ConstipationHistorical Med      oxyCODONE-acetaminophen (PERCOCET) 5-325 MG per tablet Take 1 tablet by mouth every 6 hours as needed for Pain. Historical Med      pregabalin (LYRICA) 25 MG capsule Take 1 capsule by mouth 2 times daily for 3 days. , Disp-6 capsule, R-0Print      ondansetron (ZOFRAN-ODT) 4 MG disintegrating tablet Historical Med      RHOPRESSA 0.02 % SOLN DAWHistorical Med      dorzolamide (TRUSOPT) 2 % ophthalmic solution Historical Med      brimonidine-timolol (COMBIGAN) 0.2-0.5 % ophthalmic solution Historical Med      isosorbide mononitrate (IMDUR) 30 MG extended release tablet Take 1 tablet by mouth daily, Disp-30 tablet, R-1Adjust Sig      insulin glargine (LANTUS SOLOSTAR) 100 UNIT/ML injection pen Inject 35 Units into the skin daily, Disp-5 pen, R-3NO PRINT      carvedilol (COREG) 3.125 MG tablet Take 1 tablet by mouth 2 times daily (with meals), Disp-60 tablet, R-3Normal      insulin lispro (HUMALOG) 100 UNIT/ML injection vial Inject 10 Units into the skin 3 times daily (with meals), Disp-10 mL, R-0NO PRINT      linaclotide (LINZESS) 290 MCG CAPS capsule Take 1 capsule by mouth every morning (before breakfast), Disp-30 capsule, R-1Normal      tamsulosin (FLOMAX) 0.4 MG capsule Take 0.4 mg by mouth DailyHistorical Med      atorvastatin (LIPITOR) 80 MG tablet Take 1 tablet by mouth nightly, Disp-30 tablet, R-3Normal      allopurinol (ZYLOPRIM) 300 MG tablet Take 1 tablet by mouth daily, Disp-30 tablet, R-0NO PRINT      naloxone 4 MG/0.1ML LIQD nasal spray 1 spray by Nasal route as needed for Opioid Reversal, Disp-1 each, R-0Normal acetaminophen (TYLENOL) 500 MG tablet Take 2 tablets by mouth 4 times daily as needed for Pain, Disp-60 tablet,R-0Print      senna (SENOKOT) 8.6 MG TABS tablet Take 1 tablet by mouth 2 times daily, Disp-120 tablet, R-0Print      docusate sodium (COLACE, DULCOLAX) 100 MG CAPS Take 100 mg by mouth 2 times dailyOTC      Compression Stockings MISC Starting 6/15/2016, Until Discontinued, Disp-2 each, R-1, Print2 pair, knee high compression stockings, fitted/ zippered      silver sulfADIAZINE (SILVADENE) 1 % cream Apply to BL lower leg ulcers daily, Disp-20 g, R-0, Print      fluticasone (FLONASE) 50 MCG/ACT nasal spray 1 spray by Nasal route nightly as needed. Historical Med      ferrous sulfate 325 (65 FE) MG tablet Take 325 mg by mouth 2 times daily Historical Med      omeprazole (PRILOSEC) 20 MG delayed release capsule Take 20 mg by mouth 2 times daily. Historical Med             Time Spent on discharge is more than 30 minutes in the examination, evaluation, counseling and review of medications and discharge plan. Signed:    EYAD Martinez   9/19/2022      Thank you No primary care provider on file. for the opportunity to be involved in this patient's care. If you have any questions or concerns, please feel free to contact me at [phone].

## 2022-09-27 ENCOUNTER — CLINICAL DOCUMENTATION (OUTPATIENT)
Dept: CASE MANAGEMENT | Age: 67
End: 2022-09-27

## 2022-09-27 NOTE — MANAGEMENT PLAN
Patient Management Plan          Pt. Name: Dariel Arteaga  : 1955          Management Plan entered by: SHANTELLE Greenberg      Date plan entered: 22      Patients Physicians:    Primary Care : No primary care provider on file. Contact #:    Specialists:    Contact #:              Summary      Reason for Referral: This patient has been provided a management plan for Medical Needs            Patient has had frequent visits for:    PMHx for CHF, CAD, CKD, DM2 with peripheral neuropathy, hypertension, Morbid Obesity, chronic pain. Anemia       Angina      Arthritis      CAD (coronary artery disease)      CHF (congestive heart failure) (HCC)      CKD (chronic kidney disease) stage 3, GFR 30-59 ml/min (HCC)      Clostridium difficile diarrhea 3/16/12; 12     positive stool toxin    Diabetes mellitus ([facility] [address]      Diabetic neuropathy (HCC)       feet and legs    Disease of blood and blood forming organ      Dizziness       When he moves his head/ loses his balance    GERD (gastroesophageal reflux disease)       gastric ulcer    Headache      Hearing loss      Hyperlipidemia      Hypertension      Kidney stone     Refusal of blood transfusions as patient is [place]      Sleep apnea      Tinnitus      Venous ulcer (HCC)       LLE    Wound, open                Warnings/Safety Alerts:         Situation: Chronic Conditions Summary:     Religion  One admission a month this year. Always brings up an issue with a garcia being inserted when he refused. Noncompliant with CHF, diet, etc.  Currently at [facility], [address] [place] [address].    22  ED COURSE/MDM  Patient seen and evaluated. Old records reviewed. Labs and imaging reviewed and results discussed with patient. 76-year-old male presenting with signs and symptoms concerning for CHF exacerbation, volume overload. Blood pressure in the 109 systolics, SPO2 in the low to mid 90s. He has exam that is consistent with volume overload.   Chest x-ray with pulmonary edema. Creatinine is stable at 1.9 however he is new uremia to 113. No symptoms of GI bleed. I spoke with on-call nephrology, Dr. Swapna Coles, who recommended Lasix infusion as this has previously worked well for the patient. Patient expressed frustration to nursing staff that his hospital bed was not immediately available. He states that he has chronic back pain and if he is not able to get to his hospital room soon, he wants to go back to his nursing facility. Myself and nursing staff explained to the patient that the hospital is full and we are holding patients in the emergency department. He does have elevated troponin, exam consistent with volume overload and his symptoms were only improved with Lasix infusion and he definitely requires inpatient hospitalization. Patient however does not want to listen to this and continues to want to leave [address] I have recommended admission to the hospital, but the patient refuses. The risks (including but not limited to suffering and death) as well as the benefits were explained to the patient. Questions were sought and answered, the patient voiced understanding and accepts these risks. I have encouraged the patient to return to have their evaluation completed as we are glad to do so. I have also instructed the patient on the importance of follow-up and to return for any worsening or worrisome concerns. The patient appears to have capacity to make medical decisions at this time. AMA form signed and placed on the chart. Goals/Interventions:     DO NOT INSERT DEMPSEY, patient will refuse    If in ER in CHF, renal panel and if stable  consider one time dose of IV Lasix and reevaluate in 2 hours. In ER Consult CHF SIGRID Mcguire call [phone]  Consult cardiology if needed  Nephrology consult if needed    If inpatient consider Palliative Care consult    Return to [facility] on d/c.        During Business Hours:     After Hours:       Challenges for Self Management: Motivational level (not engaged, denial of conditions, level of adherence and likelihood of followup)         Medication Management: Complications from poor adherence           Advanced Care Plan: None                         * This plan has been created by the Jordy , a multi-disciplinary team. The patient and their physician were invited to participate in this plan. This management plan is intended to provide consistent evaluation and treatment for this patient not to supersede physician judgment. *

## 2022-10-06 ENCOUNTER — HOSPITAL ENCOUNTER (OUTPATIENT)
Age: 67
Setting detail: OUTPATIENT SURGERY
Discharge: HOME | End: 2022-10-06
Attending: PAIN MEDICINE | Admitting: PAIN MEDICINE
Payer: MEDICARE

## 2022-10-06 VITALS
SYSTOLIC BLOOD PRESSURE: 137 MMHG | TEMPERATURE: 97.8 F | HEART RATE: 86 BPM | DIASTOLIC BLOOD PRESSURE: 99 MMHG | OXYGEN SATURATION: 97 % | RESPIRATION RATE: 16 BRPM

## 2022-10-06 PROBLEM — M25.511 CHRONIC PAIN IN RIGHT SHOULDER: Status: ACTIVE | Noted: 2017-09-13

## 2022-10-06 LAB
GLUCOSE BLD-MCNC: 197 MG/DL (ref 70–99)
PERFORMED ON: ABNORMAL

## 2022-10-06 PROCEDURE — 6360000004 HC RX CONTRAST MEDICATION: Performed by: PAIN MEDICINE

## 2022-10-06 PROCEDURE — 2500000003 HC RX 250 WO HCPCS: Performed by: PAIN MEDICINE

## 2022-10-06 PROCEDURE — 7100000011 HC PHASE II RECOVERY - ADDTL 15 MIN: Performed by: PAIN MEDICINE

## 2022-10-06 PROCEDURE — 2709999900 HC NON-CHARGEABLE SUPPLY: Performed by: PAIN MEDICINE

## 2022-10-06 PROCEDURE — 7100000010 HC PHASE II RECOVERY - FIRST 15 MIN: Performed by: PAIN MEDICINE

## 2022-10-06 PROCEDURE — 20610 DRAIN/INJ JOINT/BURSA W/O US: CPT | Performed by: PAIN MEDICINE

## 2022-10-06 PROCEDURE — 6360000002 HC RX W HCPCS: Performed by: PAIN MEDICINE

## 2022-10-06 PROCEDURE — 3600000002 HC SURGERY LEVEL 2 BASE: Performed by: PAIN MEDICINE

## 2022-10-06 RX ORDER — LIDOCAINE HYDROCHLORIDE 10 MG/ML
INJECTION, SOLUTION EPIDURAL; INFILTRATION; INTRACAUDAL; PERINEURAL PRN
Status: DISCONTINUED | OUTPATIENT
Start: 2022-10-06 | End: 2022-10-06 | Stop reason: ALTCHOICE

## 2022-10-06 RX ORDER — BETAMETHASONE SODIUM PHOSPHATE AND BETAMETHASONE ACETATE 3; 3 MG/ML; MG/ML
INJECTION, SUSPENSION INTRA-ARTICULAR; INTRALESIONAL; INTRAMUSCULAR; SOFT TISSUE
Status: DISCONTINUED
Start: 2022-10-06 | End: 2022-10-06 | Stop reason: HOSPADM

## 2022-10-06 ASSESSMENT — PAIN SCALES - GENERAL
PAINLEVEL_OUTOF10: 9
PAINLEVEL_OUTOF10: 6

## 2022-10-06 ASSESSMENT — PAIN DESCRIPTION - DESCRIPTORS: DESCRIPTORS: SHARP

## 2022-10-06 ASSESSMENT — PAIN - FUNCTIONAL ASSESSMENT
PAIN_FUNCTIONAL_ASSESSMENT: PREVENTS OR INTERFERES SOME ACTIVE ACTIVITIES AND ADLS
PAIN_FUNCTIONAL_ASSESSMENT: 0-10

## 2022-10-06 NOTE — DISCHARGE INSTRUCTIONS
[address] [facility]    [phone]    Post Pain Management Injection    PATIENT INSTRUCTIONS:     -Resume Normal Diet  -Other    ACTIVITY:    -No driving or operating machinery for 8 hours post procedure without sedation and 24 hours with sedation. If you are seen driving during this time the proper authorities will be notified.  -Do not stay alone for 4-6 hours after the procedure.  -If you have had IV sedation, do not sign legal documents, make any major decisions, or be involved in work decisions for the remainder of the day. -May shower or bathe.  -Resume normal activity when full movement/sensation has returned in extremities. 3)  SITE CARE:    -Observe puncture site for signs of infection (redness, warmth swelling, drainage with a foul odor, fever or increased tenderness). 4)  EXPECTED SIDE EFFECTS:    -Numbness/tingling/weakness in extremities, if this lasts more than 6 hours notify Dr. Lorena Kc. -Muscle stiffness, soreness at puncture site (soreness may last 2-4 days). DIABETIC PATIENTS ONLY:    -Increased glucose levels in all diabetic patients who have received a steroid injection. -Monitor blood sugars frequently for the first 5 days following procedure.      -Adjust medication accordingly. 6)  TO REACH DR. Collette Heymann: Call [phone]    ADDITIONAL INSTRUCTIONS:    Follow-up as scheduled or call for appointment if not already done. Patients taking Coumadin may resume taking as before the procedure.

## 2022-10-06 NOTE — PROGRESS NOTES
Discharge  instructions reviewed. Pt verbalize understanding with no further questions. VSS. Pt discharged via [facility] to car. Assessment unchanged.

## 2022-10-06 NOTE — PROCEDURES
Yeiosn Perez is a 75 y.o. male patient. No diagnosis found. Past Medical History:   Diagnosis Date    Anemia     Angina     Arthritis     CAD (coronary artery disease)     CHF (congestive heart failure) ([facility])     CKD (chronic kidney disease) stage 3, GFR 30-59 ml/min ([facility])     Clostridium difficile diarrhea 3/16/12; 2/29/12    positive stool toxin    Diabetes mellitus ([address]     Diabetic neuropathy ([facility])     feet and legs    Disease of blood and blood forming organ     Dizziness     When he moves his head/ loses his balance    GERD (gastroesophageal reflux disease)     gastric ulcer    Headache     Hearing loss     Hyperlipidemia     Hypertension     Kidney stone 2002    Refusal of blood transfusions as patient is [employer]     Sleep apnea     Tinnitus     Venous ulcer ([facility])     LLE    Wound, open 1/13/2012     Blood pressure 111/69, pulse 89, temperature 97.8 °F (36.6 °C), resp. rate 20, SpO2 94 %. Procedures    Avery David MD  10/6/2022  R SHOULDER INJECTION 20610 with 10096    DX: 719.41, M25.511, M25.512    PT IN SUPINE POSITION. R SHOULDER PREPPED AND DRAPED. AFTER LOCAL INFILTRATION, 22G 5 INCH NEEDLE PLACED UNDER FLOURO AT THE 11 O CLOCK POSITION OF THE HUMERAL HEAD. INJECTION OF 0.5 CC OMNIPAQUE SHOWED APPROPRIATE SPREAD. 1 CC OF 1% LIDO WITH 80MG DEPOMEDROL INJECTED AFTER NEGATIVE ASPIRATION FOR BLOOD. NEEDLE PULLED OUT INTACT. PT TOLERATED PROCEDURE WELL. DC INSTRUCTIONS GIVEN.     ESTIMATED BLOOD LOSS:  Less than 1 cc  R

## 2022-10-21 ENCOUNTER — HOSPITAL ENCOUNTER (OUTPATIENT)
Age: 67
Setting detail: OBSERVATION
Discharge: NURSING, REHAB OR LONG-TERM CARE | End: 2022-10-25
Attending: EMERGENCY MEDICINE
Payer: MEDICARE

## 2022-10-21 DIAGNOSIS — N18.9 ACUTE RENAL FAILURE SUPERIMPOSED ON CHRONIC KIDNEY DISEASE, UNSPECIFIED CKD STAGE, UNSPECIFIED ACUTE RENAL FAILURE TYPE (HCC): Primary | ICD-10-CM

## 2022-10-21 DIAGNOSIS — G89.29 CHRONIC PAIN IN RIGHT SHOULDER: ICD-10-CM

## 2022-10-21 DIAGNOSIS — N17.9 ACUTE RENAL FAILURE SUPERIMPOSED ON CHRONIC KIDNEY DISEASE, UNSPECIFIED CKD STAGE, UNSPECIFIED ACUTE RENAL FAILURE TYPE (HCC): Primary | ICD-10-CM

## 2022-10-21 DIAGNOSIS — M25.511 CHRONIC PAIN IN RIGHT SHOULDER: ICD-10-CM

## 2022-10-21 PROBLEM — E86.0 DEHYDRATION: Status: ACTIVE | Noted: 2022-10-21

## 2022-10-21 LAB
A/G RATIO: 1 (ref 1.1–2.2)
ALBUMIN SERPL-MCNC: 4.1 G/DL (ref 3.4–5)
ALP BLD-CCNC: 104 U/L (ref 40–129)
ALT SERPL-CCNC: 19 U/L (ref 10–40)
ANION GAP SERPL CALCULATED.3IONS-SCNC: 12 MMOL/L (ref 3–16)
ANION GAP SERPL CALCULATED.3IONS-SCNC: 9 MMOL/L (ref 3–16)
AST SERPL-CCNC: 27 U/L (ref 15–37)
BASOPHILS ABSOLUTE: 0.1 K/UL (ref 0–0.2)
BASOPHILS RELATIVE PERCENT: 1 %
BILIRUB SERPL-MCNC: 1 MG/DL (ref 0–1)
BILIRUBIN URINE: NEGATIVE
BLOOD, URINE: NEGATIVE
BUN BLDV-MCNC: 136 MG/DL (ref 7–20)
BUN BLDV-MCNC: 154 MG/DL (ref 7–20)
CALCIUM SERPL-MCNC: 10.5 MG/DL (ref 8.3–10.6)
CALCIUM SERPL-MCNC: 9.9 MG/DL (ref 8.3–10.6)
CHLORIDE BLD-SCNC: 77 MMOL/L (ref 99–110)
CHLORIDE BLD-SCNC: 82 MMOL/L (ref 99–110)
CLARITY: CLEAR
CO2: 31 MMOL/L (ref 21–32)
CO2: 35 MMOL/L (ref 21–32)
COLOR: YELLOW
CREAT SERPL-MCNC: 1.7 MG/DL (ref 0.8–1.3)
CREAT SERPL-MCNC: 1.9 MG/DL (ref 0.8–1.3)
EOSINOPHILS ABSOLUTE: 0.2 K/UL (ref 0–0.6)
EOSINOPHILS RELATIVE PERCENT: 2.4 %
GFR SERPL CREATININE-BSD FRML MDRD: 38 ML/MIN/{1.73_M2}
GFR SERPL CREATININE-BSD FRML MDRD: 43 ML/MIN/{1.73_M2}
GLUCOSE BLD-MCNC: 389 MG/DL (ref 70–99)
GLUCOSE BLD-MCNC: 446 MG/DL (ref 70–99)
GLUCOSE BLD-MCNC: 463 MG/DL (ref 70–99)
GLUCOSE BLD-MCNC: 469 MG/DL (ref 70–99)
GLUCOSE BLD-MCNC: 478 MG/DL (ref 70–99)
GLUCOSE BLD-MCNC: 504 MG/DL (ref 70–99)
GLUCOSE URINE: 250 MG/DL
HCT VFR BLD CALC: 35.5 % (ref 40.5–52.5)
HEMOGLOBIN: 11.9 G/DL (ref 13.5–17.5)
KETONES, URINE: NEGATIVE MG/DL
LEUKOCYTE ESTERASE, URINE: NEGATIVE
LYMPHOCYTES ABSOLUTE: 1 K/UL (ref 1–5.1)
LYMPHOCYTES RELATIVE PERCENT: 12.4 %
MCH RBC QN AUTO: 27.2 PG (ref 26–34)
MCHC RBC AUTO-ENTMCNC: 33.4 G/DL (ref 31–36)
MCV RBC AUTO: 81.5 FL (ref 80–100)
MICROSCOPIC EXAMINATION: ABNORMAL
MONOCYTES ABSOLUTE: 0.7 K/UL (ref 0–1.3)
MONOCYTES RELATIVE PERCENT: 8.7 %
NEUTROPHILS ABSOLUTE: 6.1 K/UL (ref 1.7–7.7)
NEUTROPHILS RELATIVE PERCENT: 75.5 %
NITRITE, URINE: NEGATIVE
PDW BLD-RTO: 18.8 % (ref 12.4–15.4)
PERFORMED ON: ABNORMAL
PH UA: 6 (ref 5–8)
PLATELET # BLD: 174 K/UL (ref 135–450)
PMV BLD AUTO: 9.5 FL (ref 5–10.5)
POTASSIUM REFLEX MAGNESIUM: 4 MMOL/L (ref 3.5–5.1)
POTASSIUM SERPL-SCNC: 3.8 MMOL/L (ref 3.5–5.1)
PROTEIN UA: NEGATIVE MG/DL
RBC # BLD: 4.35 M/UL (ref 4.2–5.9)
SODIUM BLD-SCNC: 121 MMOL/L (ref 136–145)
SODIUM BLD-SCNC: 125 MMOL/L (ref 136–145)
SPECIFIC GRAVITY UA: 1.01 (ref 1–1.03)
TOTAL PROTEIN: 8.1 G/DL (ref 6.4–8.2)
URINE REFLEX TO CULTURE: ABNORMAL
URINE TYPE: ABNORMAL
UROBILINOGEN, URINE: 0.2 E.U./DL
WBC # BLD: 8.1 K/UL (ref 4–11)

## 2022-10-21 PROCEDURE — 96360 HYDRATION IV INFUSION INIT: CPT

## 2022-10-21 PROCEDURE — 81003 URINALYSIS AUTO W/O SCOPE: CPT

## 2022-10-21 PROCEDURE — G0378 HOSPITAL OBSERVATION PER HR: HCPCS

## 2022-10-21 PROCEDURE — 96361 HYDRATE IV INFUSION ADD-ON: CPT

## 2022-10-21 PROCEDURE — 2700000000 HC OXYGEN THERAPY PER DAY

## 2022-10-21 PROCEDURE — 99285 EMERGENCY DEPT VISIT HI MDM: CPT

## 2022-10-21 PROCEDURE — 2580000003 HC RX 258: Performed by: INTERNAL MEDICINE

## 2022-10-21 PROCEDURE — 85025 COMPLETE CBC W/AUTO DIFF WBC: CPT

## 2022-10-21 PROCEDURE — 36415 COLL VENOUS BLD VENIPUNCTURE: CPT

## 2022-10-21 PROCEDURE — 96372 THER/PROPH/DIAG INJ SC/IM: CPT

## 2022-10-21 PROCEDURE — 6360000002 HC RX W HCPCS: Performed by: NURSE PRACTITIONER

## 2022-10-21 PROCEDURE — 94761 N-INVAS EAR/PLS OXIMETRY MLT: CPT

## 2022-10-21 PROCEDURE — 6370000000 HC RX 637 (ALT 250 FOR IP): Performed by: NURSE PRACTITIONER

## 2022-10-21 PROCEDURE — 80053 COMPREHEN METABOLIC PANEL: CPT

## 2022-10-21 RX ORDER — DEXTROSE MONOHYDRATE 100 MG/ML
INJECTION, SOLUTION INTRAVENOUS CONTINUOUS PRN
Status: DISCONTINUED | OUTPATIENT
Start: 2022-10-21 | End: 2022-10-25 | Stop reason: HOSPADM

## 2022-10-21 RX ORDER — INSULIN GLARGINE 100 [IU]/ML
35 INJECTION, SOLUTION SUBCUTANEOUS DAILY
Status: DISCONTINUED | OUTPATIENT
Start: 2022-10-21 | End: 2022-10-25 | Stop reason: HOSPADM

## 2022-10-21 RX ORDER — SODIUM CHLORIDE 9 MG/ML
INJECTION, SOLUTION INTRAVENOUS CONTINUOUS
Status: DISCONTINUED | OUTPATIENT
Start: 2022-10-21 | End: 2022-10-23

## 2022-10-21 RX ORDER — FERROUS SULFATE 325(65) MG
325 TABLET ORAL 2 TIMES DAILY
Status: DISCONTINUED | OUTPATIENT
Start: 2022-10-21 | End: 2022-10-25 | Stop reason: HOSPADM

## 2022-10-21 RX ORDER — INSULIN LISPRO 100 [IU]/ML
0-4 INJECTION, SOLUTION INTRAVENOUS; SUBCUTANEOUS NIGHTLY
Status: DISCONTINUED | OUTPATIENT
Start: 2022-10-21 | End: 2022-10-25 | Stop reason: HOSPADM

## 2022-10-21 RX ORDER — ISOSORBIDE MONONITRATE 30 MG/1
30 TABLET, EXTENDED RELEASE ORAL DAILY
Status: DISCONTINUED | OUTPATIENT
Start: 2022-10-21 | End: 2022-10-25 | Stop reason: HOSPADM

## 2022-10-21 RX ORDER — POLYETHYLENE GLYCOL 3350 17 G/17G
17 POWDER, FOR SOLUTION ORAL DAILY PRN
Status: DISCONTINUED | OUTPATIENT
Start: 2022-10-21 | End: 2022-10-25 | Stop reason: HOSPADM

## 2022-10-21 RX ORDER — ENOXAPARIN SODIUM 100 MG/ML
30 INJECTION SUBCUTANEOUS 2 TIMES DAILY
Status: DISCONTINUED | OUTPATIENT
Start: 2022-10-21 | End: 2022-10-22

## 2022-10-21 RX ORDER — TAMSULOSIN HYDROCHLORIDE 0.4 MG/1
0.4 CAPSULE ORAL DAILY
Status: DISCONTINUED | OUTPATIENT
Start: 2022-10-22 | End: 2022-10-25 | Stop reason: HOSPADM

## 2022-10-21 RX ORDER — ATORVASTATIN CALCIUM 80 MG/1
80 TABLET, FILM COATED ORAL NIGHTLY
Status: DISCONTINUED | OUTPATIENT
Start: 2022-10-21 | End: 2022-10-25 | Stop reason: HOSPADM

## 2022-10-21 RX ORDER — ONDANSETRON 4 MG/1
4 TABLET, ORALLY DISINTEGRATING ORAL EVERY 8 HOURS PRN
Status: DISCONTINUED | OUTPATIENT
Start: 2022-10-21 | End: 2022-10-25 | Stop reason: HOSPADM

## 2022-10-21 RX ORDER — SODIUM CHLORIDE 0.9 % (FLUSH) 0.9 %
5-40 SYRINGE (ML) INJECTION EVERY 12 HOURS SCHEDULED
Status: DISCONTINUED | OUTPATIENT
Start: 2022-10-21 | End: 2022-10-25 | Stop reason: HOSPADM

## 2022-10-21 RX ORDER — LANOLIN ALCOHOL/MO/W.PET/CERES
400 CREAM (GRAM) TOPICAL 2 TIMES DAILY
Status: DISCONTINUED | OUTPATIENT
Start: 2022-10-21 | End: 2022-10-22

## 2022-10-21 RX ORDER — ACETAMINOPHEN 500 MG
1000 TABLET ORAL 4 TIMES DAILY PRN
Status: DISCONTINUED | OUTPATIENT
Start: 2022-10-21 | End: 2022-10-25

## 2022-10-21 RX ORDER — ACETAMINOPHEN 325 MG/1
650 TABLET ORAL EVERY 6 HOURS PRN
Status: DISCONTINUED | OUTPATIENT
Start: 2022-10-21 | End: 2022-10-25 | Stop reason: HOSPADM

## 2022-10-21 RX ORDER — OXYCODONE HYDROCHLORIDE AND ACETAMINOPHEN 5; 325 MG/1; MG/1
1 TABLET ORAL EVERY 6 HOURS PRN
Status: DISCONTINUED | OUTPATIENT
Start: 2022-10-21 | End: 2022-10-25 | Stop reason: HOSPADM

## 2022-10-21 RX ORDER — ONDANSETRON 2 MG/ML
4 INJECTION INTRAMUSCULAR; INTRAVENOUS EVERY 6 HOURS PRN
Status: DISCONTINUED | OUTPATIENT
Start: 2022-10-21 | End: 2022-10-25 | Stop reason: HOSPADM

## 2022-10-21 RX ORDER — ACETAMINOPHEN 650 MG/1
650 SUPPOSITORY RECTAL EVERY 6 HOURS PRN
Status: DISCONTINUED | OUTPATIENT
Start: 2022-10-21 | End: 2022-10-25 | Stop reason: HOSPADM

## 2022-10-21 RX ORDER — SODIUM CHLORIDE 0.9 % (FLUSH) 0.9 %
5-40 SYRINGE (ML) INJECTION PRN
Status: DISCONTINUED | OUTPATIENT
Start: 2022-10-21 | End: 2022-10-25 | Stop reason: HOSPADM

## 2022-10-21 RX ORDER — SODIUM CHLORIDE 9 MG/ML
INJECTION, SOLUTION INTRAVENOUS PRN
Status: DISCONTINUED | OUTPATIENT
Start: 2022-10-21 | End: 2022-10-25 | Stop reason: HOSPADM

## 2022-10-21 RX ORDER — CARVEDILOL 3.12 MG/1
3.12 TABLET ORAL 2 TIMES DAILY WITH MEALS
Status: DISCONTINUED | OUTPATIENT
Start: 2022-10-21 | End: 2022-10-25 | Stop reason: HOSPADM

## 2022-10-21 RX ORDER — INSULIN LISPRO 100 [IU]/ML
0-16 INJECTION, SOLUTION INTRAVENOUS; SUBCUTANEOUS
Status: DISCONTINUED | OUTPATIENT
Start: 2022-10-21 | End: 2022-10-25 | Stop reason: HOSPADM

## 2022-10-21 RX ADMIN — Medication 400 MG: at 22:02

## 2022-10-21 RX ADMIN — FERROUS SULFATE TAB 325 MG (65 MG ELEMENTAL FE) 325 MG: 325 (65 FE) TAB at 22:02

## 2022-10-21 RX ADMIN — ENOXAPARIN SODIUM 30 MG: 100 INJECTION SUBCUTANEOUS at 22:36

## 2022-10-21 RX ADMIN — INSULIN LISPRO 4 UNITS: 100 INJECTION, SOLUTION INTRAVENOUS; SUBCUTANEOUS at 22:06

## 2022-10-21 RX ADMIN — OXYCODONE AND ACETAMINOPHEN 1 TABLET: 325; 5 TABLET ORAL at 22:02

## 2022-10-21 RX ADMIN — ATORVASTATIN CALCIUM 80 MG: 80 TABLET, FILM COATED ORAL at 22:03

## 2022-10-21 RX ADMIN — CARVEDILOL 3.12 MG: 3.12 TABLET, FILM COATED ORAL at 22:02

## 2022-10-21 RX ADMIN — SODIUM CHLORIDE: 9 INJECTION, SOLUTION INTRAVENOUS at 14:43

## 2022-10-21 RX ADMIN — ISOSORBIDE MONONITRATE 30 MG: 30 TABLET, EXTENDED RELEASE ORAL at 22:32

## 2022-10-21 RX ADMIN — INSULIN GLARGINE 35 UNITS: 100 INJECTION, SOLUTION SUBCUTANEOUS at 22:07

## 2022-10-21 ASSESSMENT — PAIN - FUNCTIONAL ASSESSMENT: PAIN_FUNCTIONAL_ASSESSMENT: NONE - DENIES PAIN

## 2022-10-21 ASSESSMENT — PAIN SCALES - GENERAL
PAINLEVEL_OUTOF10: 8
PAINLEVEL_OUTOF10: 5
PAINLEVEL_OUTOF10: 5

## 2022-10-21 ASSESSMENT — PAIN DESCRIPTION - LOCATION
LOCATION: HEAD
LOCATION: HEAD

## 2022-10-21 NOTE — H&P
Hospital Medicine History & Physical      PCP: No primary care provider on file. Date of Admission: 10/21/2022    Date of Service: Pt seen/examined on 10/21/2022 and Admitted to observation with expected LOS less than two midnights due to medical therapy. Chief Complaint: Abnormal lab work    History Of Present Illness:      75 y.o. male, with past medical history of CKD, hypertension, CAD, obesity and CHF, who presented to [facility] with abnormal lab work. History obtained from the patient and review of EMR. The patient stated he has been experiencing fatigue for the last couple of days. He states that he has been eating and drinking within his normal limits, but is still not feeling well. The patient reported that he did have blood work done and was told he had some abnormal renal labs and should be evaluated at the emergency room. Upon arrival to the emergency room the patient was found to have a BUN of 154 and creatinine of 1.9. The patient does have a history of chronic renal failure with a baseline creatinine of 1.5-1.7. This is likely prerenal secondary to dehydration. Nephrology was consulted in the emergency room. The patient was admitted for further evaluation and treatment. Normal saline was initiated at 60 mL/h. The patient denied any other associated symptoms as well as any aggravating and/or alleviating factors. At the time of this assessment, the patient was resting comfortably in bed. He currently denies any chest pain, back pain, abdominal pain, shortness of breath, numbness, tingling, N/V/C/D, fever and/or chills.      Past Medical History:          Diagnosis Date    Anemia     Angina     Arthritis     CAD (coronary artery disease)     CHF (congestive heart failure) ([facility])     CKD (chronic kidney disease) stage 3, GFR 30-59 ml/min ([facility])     Clostridium difficile diarrhea 3/16/12; 2/29/12    positive stool toxin    Diabetes mellitus ([facility]     Diabetic neuropathy ([facility] feet and legs    Disease of blood and blood forming organ     Dizziness     When he moves his head/ loses his balance    GERD (gastroesophageal reflux disease)     gastric ulcer    Headache     Hearing loss     Hyperlipidemia     Hypertension     Kidney stone 2002    Refusal of blood transfusions as patient is [employer]     Sleep apnea     Tinnitus     Venous ulcer ([address]     LLE    Wound, open 1/13/2012     Past Surgical History:          Procedure Laterality Date    CARDIAC SURGERY      Dec 27 2010, triple bypass    CHOLECYSTECTOMY  9/2011    HERNIA REPAIR  age1    KNEE ARTHROCENTESIS Right 10/6/2022    RIGHT SHOULDER INTRA ARTICULAR INJECTION SITE CONFIRMED BY FLUOROSCOPY performed by Asuncion James MD at [facility] EG OR    OTHER SURGICAL HISTORY  11-16-11 REPAIR LOWER STERNAL INCISION, REMOVAL OF ONE STERNAL WIRE,    OTHER SURGICAL HISTORY  10/10/2014    phacoemulsification of cataract with intraocular lens implant left eye    PAIN MANAGEMENT PROCEDURE N/A 2/10/2022    MIDLINE CERVICAL SIX SEVEN EPIDURAL STEROID INJECTION SITE CONFIRMED BY FLUOROSCOPY performed by Asuncion James MD at [place] [address] N/A 7/21/2022    MIDLINE TO RIGHT CERVICAL SIX SEVEN EPIDURAL STEROID INJECTION SITE CONFIRMED BY FLUOROSCOPY performed by Asuncion James MD at [address] [place] [address] ENDOSCOPY       Medications Prior to Admission:      Prior to Admission medications    Medication Sig Start Date End Date Taking?  Authorizing Provider   magnesium oxide (MAG-OX) 400 (240 Mg) MG tablet Take 1 tablet by mouth 2 times daily 9/1/22   EYAD Benavides   spironolactone (ALDACTONE) 25 MG tablet Take 1 tablet by mouth daily 9/2/22   EYAD Benavides   metOLazone (ZAROXOLYN) 5 MG tablet 1 tab three times a week (Monday, Wednesday, and fridays) 9/1/22   EYAD Benavides   potassium chloride (KLOR-CON M) 20 MEQ extended release tablet  7/29/22   Historical Provider, MD   meclizine (ANTIVERT) 25 MG tablet  7/27/22   Historical Provider, MD   torsemide (DEMADEX) 100 MG tablet Take 1 tablet by mouth in the morning and at bedtime 8/23/22   PATRICK Wharton CNP   Sodium Phosphates (FLEET) 7-19 GM/118ML Place 1 enema rectally daily as needed    Historical Provider, MD   glucagon 1 MG injection Inject 1 kit into the muscle once Emergencies only    Historical Provider, MD   Glycerin, Laxative, (GLYCERIN, ADULT,) 2 g SUPP suppository Place 1 suppository rectally as needed  Patient not taking: No sig reported    Historical Provider, MD   loperamide (IMODIUM) 2 MG capsule Take 2 mg by mouth as needed for Diarrhea    Historical Provider, MD   magnesium hydroxide (MILK OF MAGNESIA) 400 MG/5ML suspension Take by mouth as needed for Constipation    Historical Provider, MD   oxyCODONE-acetaminophen (PERCOCET) 5-325 MG per tablet Take 1 tablet by mouth every 6 hours as needed for Pain. Historical Provider, MD   pregabalin (LYRICA) 25 MG capsule Take 1 capsule by mouth 2 times daily for 3 days. Patient taking differently: Take 25 mg by mouth 2 times daily.  Patient is still taking this medication through nursing home pharmacy as of 6/6/22 5/29/22 10/6/22  Carla Mcknight MD   ondansetron (ZOFRAN-ODT) 4 MG disintegrating tablet  5/16/22   Historical Provider, MD   RHOPRESSA 0.02 % SOLN  5/19/22   Historical Provider, MD   dorzolamide (TRUSOPT) 2 % ophthalmic solution  5/21/22   Historical Provider, MD   brimonidine-timolol (COMBIGAN) 0.2-0.5 % ophthalmic solution  5/20/22   Historical Provider, MD   isosorbide mononitrate (IMDUR) 30 MG extended release tablet Take 1 tablet by mouth daily 5/26/22   PATRICK Wharton CNP   insulin glargine (LANTUS SOLOSTAR) 100 UNIT/ML injection pen Inject 35 Units into the skin daily  Patient taking differently: Inject 35 Units into the skin daily Takes 15 units at bedtime and 40 units in the AM 4/9/22   Gauri Michael MD   carvedilol (COREG) 3.125 MG tablet Take 1 tablet by mouth 2 times daily (with meals) 22   Carlos Cook MD   insulin lispro (HUMALOG) 100 UNIT/ML injection vial Inject 10 Units into the skin 3 times daily (with meals) 22   Carlos Cook MD   linaclotide Lisa Labella) 290 MCG CAPS capsule Take 1 capsule by mouth every morning (before breakfast) 4/10/22   Carlos Cook MD   tamsulosin [facility]) 0.4 MG capsule Take 0.4 mg by mouth Daily 21   Historical Provider, MD   atorvastatin (LIPITOR) 80 MG tablet Take 1 tablet by mouth nightly 3/14/22   PATRICK Nino CNP   allopurinol (ZYLOPRIM) 300 MG tablet Take 1 tablet by mouth daily 3/15/22   Whitney Benítez MD   naloxone 4 MG/0.1ML LIQD nasal spray 1 spray by Nasal route as needed for Opioid Reversal 21   Zoltan Doyle MD   acetaminophen (TYLENOL) 500 MG tablet Take 2 tablets by mouth 4 times daily as needed for Pain 20   PATRCIK Meza CNP   senna (SENOKOT) 8.6 MG TABS tablet Take 1 tablet by mouth 2 times daily 19   PATRICK Medeiros CNP   docusate sodium (COLACE, DULCOLAX) 100 MG CAPS Take 100 mg by mouth 2 times daily 18   PATRICK Walker CNP   Compression Stockings MISC by Does not apply route 2 pair, knee high compression stockings, fitted/ zippered 6/15/16   PATRICK Diamond CNP   silver sulfADIAZINE (SILVADENE) 1 % cream Apply to BL lower leg ulcers daily 16   David Valdes MD   nitroGLYCERIN (NITROSTAT) 0.4 MG SL tablet Place 1 tablet under the tongue every 5 minutes as needed  Patient not taking: No sig reported 8/17/15 9/1/22  PATRICK Diamond CNP   fluticasone (FLONASE) 50 MCG/ACT nasal spray 1 spray by Nasal route nightly as needed. Historical Provider, MD   ferrous sulfate 325 (65 FE) MG tablet Take 325 mg by mouth 2 times daily  12/22/10   Historical Provider, MD   omeprazole (PRILOSEC) 20 MG delayed release capsule Take 20 mg by mouth 2 times daily.  12/22/10   Historical Provider, MD Allergies:  Amitriptyline, Celecoxib, Cymbalta [duloxetine hcl], Elavil [amitriptyline hcl], Gabapentin, and Nsaids    Social History:      The patient currently lives in a nursing home    TOBACCO:   reports that he quit smoking about 34 years ago. His smoking use included cigarettes. He has a 16.00 pack-year smoking history. He has never used smokeless tobacco.  ETOH:   reports no history of alcohol use. E-cigarette/Vaping       Questions Responses    E-cigarette/Vaping Use Never User    Start Date     Passive Exposure     Quit Date     Counseling Given     Comments           Family History:      Reviewed and negative in regards to presenting illness/complaint. Problem Relation Age of Onset    Heart Disease Mother     Heart Disease Father     Cancer Father     Diabetes Brother     Diabetes Brother      REVIEW OF SYSTEMS COMPLETED:   Pertinent positives as noted in the HPI. All other systems reviewed and negative. PHYSICAL EXAM PERFORMED:    /61   Pulse 75   Temp 97.8 °F (36.6 °C) (Oral)   Resp 17   Ht 5' 9\" (1.753 m)   Wt 232 lb (105.2 kg)   SpO2 99%   BMI 34.26 kg/m²     General appearance: Pleasant, obese male in no apparent distress, appears stated age and cooperative. HEENT: Pupils equal, round, and reactive to light. Extra ocular muscles intact. Conjunctivae/corneas clear. Neck: Supple, with full range of motion. No jugular venous distention. Trachea midline. Respiratory:  Normal respiratory effort. Clear to auscultation, bilaterally without Rales/Wheezes/Rhonchi. Cardiovascular:  Regular rate and rhythm with normal S1/S2 without murmurs, rubs or gallops. Abdomen: Soft, obese, round non-tender, non-distended with normal bowel sounds. Musculoskeletal:  No clubbing, cyanosis or edema bilaterally. Full range of motion without deformity. Skin: Skin color, texture, turgor normal.  No significant rashes or lesions. Neurologic:  Neurovascularly intact.  Cranial nerves: II-XII intact, grossly non-focal.  Psychiatric:  Alert and oriented, thought content appropriate, normal insight  Capillary Refill: Brisk,3 seconds, normal  Peripheral Pulses: +2 palpable, equal bilaterally     Labs:     Recent Labs     10/21/22  1150   WBC 8.1   HGB 11.9*   HCT 35.5*        Recent Labs     10/21/22  1150   *   K 4.0   CL 77*   CO2 35*   *   CREATININE 1.9*   CALCIUM 10.5     Recent Labs     10/21/22  1150   AST 27   ALT 19   BILITOT 1.0   ALKPHOS 104     Urinalysis:      Lab Results   Component Value Date/Time    NITRU Negative 10/21/2022 01:20 PM    WBCUA 0-2 11/05/2013 10:31 AM    BACTERIA Rare 11/05/2013 10:31 AM    RBCUA 0-2 11/05/2013 10:31 AM    BLOODU Negative 10/21/2022 01:20 PM    SPECGRAV 1.010 10/21/2022 01:20 PM    GLUCOSEU 250 10/21/2022 01:20 PM    GLUCOSEU NEGATIVE 02/29/2012 03:17 PM     Radiology:     CXR: I have reviewed the CXR with the following interpretation: N/A    EKG:  I have reviewed the EKG with the following interpretation: N/A    No orders to display     Consults:    IP CONSULT TO NEPHROLOGY    ASSESSMENT:    Active Hospital Problems    Diagnosis Date Noted    Obesity [E66.9] 01/07/2013    CKD (chronic kidney disease) stage 3, GFR 30-59 ml/min ([facility] [N18.30] 09/01/2012    Essential hypertension [I10] 02/06/2012    DM [facility]diabetes mellitus) [facility] [E11.9] 01/11/2012    Coronary artery disease involving native coronary artery of native heart without angina pectoris [I25.10] 01/11/2012     PLAN:    Dehydration in patient with known CKD stage III  -NS @ 60/HR  -Nephrology consult-appreciate recommendations in advance  -BMP in a.m.  -Hold diuretics at this time  -Avoid nephrotoxic agents    Essential hypertension in setting of known CAD  -Continue Coreg   -Continue Imdur    Hyperlipidemia  -Continue atorvastatin    dCHF, stable  -Does not appear to be in acute exacerbation at this time  -Strict I and O  -Daily weights  -Hold spironolactone and torsemide at this time    Obesity  With Body mass index is 34.2 kg/m². Complicating assessment and treatment. Placing patient at risk for multiple co-morbidities as well as early death and contributing to the patient's presentation. Counseled on weight loss    DM2, uncontrolled  -  -hemglobin a1c 8.9 on 8/25/2020  -Continue Lantus  -hdssi  -poct ac/hs  -hypoglycemia protocol  -carb control diet    Chronic normocytic anemia  -No signs or symptoms of bleeding at this time  -CBC in a.m.  -Continue ferrous sulfate    DVT Prophylaxis: heparin    Diet: No diet orders on file    Code Status: Prior    PT/OT Eval Status: pt/ot    Dispo - 2-3 days pending clinical     PATRICK Lund - CNP    Thank you No primary care provider on file. for the opportunity to be involved in this patient's care.  If you have any questions or concerns please feel free to contact me at [phone].  --------------------Anticipated Dr. George reed-----------------------------------

## 2022-10-21 NOTE — CONSULTS
Patient seen and examined, consult note dictated. Assessment and Plan:     1- A/CKD: The patient has chronic kidney disease with a baseline creatinine of 1.5 to 1.7 mg/dl. His MARGARITO is likely secondary to a pre-renal component, although an ATN injury cannot be ruled out at this time. - Hold diuretics. - Attempt gentle volume expansion .  - Avoid all nephrotoxic agents at this time. - Maintain systolic blood pressure > 120 mmHg. 2- Hyponatremia: Secondary to hypovolemia, will start 0.9% NaCl and monitor serial labs to avoid overcorrection. 3- HTN: Blood pressure within acceptable range. 4- Anemia: Stable hemoglobin, monitor.

## 2022-10-21 NOTE — ACP (ADVANCE CARE PLANNING)
Advance Care Planning     General Advance Care Planning (ACP) Conversation    Date of Conversation: 10/21/2022  Conducted with: Patient with Decision Making Capacity    Healthcare Decision Maker:    Primary Decision Maker: Neo Pradhan - [phone]    Secondary Decision Maker: Sadie Perez - [facility] - [phone]  Click here to complete [address] including selection of the Healthcare Decision Maker Relationship (ie \"Primary\"). Today we documented desired Decision Maker(s), who is (are) NOT Legal Next of Kin. ACP documents are required for decision maker authority.     Content/Action Overview:  Has NO ACP documents/care preferences - information provided, considering goals and options          Length of Voluntary ACP Conversation in minutes:  <16 minutes (Non-Billable)    ABELINO Anderson

## 2022-10-21 NOTE — ED NOTES
Perfect Serve sent [phone] EDT without any admit orders. A second perfect serve sent to hospitalist at 1550. Awaiting orders and response.           Rea Ott RN  10/21/22 [id number]

## 2022-10-21 NOTE — CONSULTS
[facility]                                  CONSULTATION    PATIENT NAME: Geo KILLIAN                    :        1955  MED REC NO:   [id number]                          ROOM:       24  ACCOUNT NO:   [de-identified]                           ADMIT DATE: 10/21/2022  PROVIDER:     Tato Urrutia MD    CONSULT DATE:  10/21/2022    REASON FOR CONSULTATION:  Acute-on-chronic kidney injury. HISTORY OF PRESENT ILLNESS:  The patient is a 77-year-old  male  patient with past medical history significant for chronic kidney disease  and a baseline creatinine ranging between 1.5 and 1.7 mg/dL. The  patient has had repeated hospitalizations for volume overload and  diastolic heart failure exacerbation. He was recently admitted in  2022 and was discharged on a diuretic regimen consisting of torsemide  and Metolazone. The patient underwent routine laboratories as outpatient and  was noted to have a significantly worsening kidney function with a serum  creatinine trending up to 1.9 and a BUN of 154, which prompted his  presentation. PAST MEDICAL HISTORY:  1. Chronic kidney disease. 2.  Diastolic heart failure. 3.  Coronary artery disease. 4.  Arthritis. 5.  Diabetes mellitus. 6.  Hypertension. 7.  Hyperlipidemia. 8.  Nephrolithiasis. PAST SURGICAL HISTORY:  1. Cholecystectomy. 2.  Coronary artery bypass graft. 3.  Hernia repair. ALLERGIES:  The patient is allergic to AMITRIPTYLINE, CYMBALTA,  CELECOXIB, and GABAPENTIN. SOCIAL HISTORY:  The patient quit smoking many years ago and does not  drink alcohol. FAMILY HISTORY:  Significant for coronary artery disease in his mother  and father. REVIEW OF SYSTEMS:  The patient denied any nausea, vomiting or abdominal  pain. Otherwise, a 10-point review of systems was relatively  unremarkable.     PHYSICAL EXAMINATION:  VITAL SIGNS:  Blood pressure 109/63, heart rate 75, respirations 18,  temperature 97.8 Fahrenheit. The patient is satting 99% on room air. GENERAL APPEARANCE:  The patient is alert and oriented x3, not in acute  distress. HEENT:  Eyes revealed normal conjunctivae, reactive pupils. NECK:  Revealed midline trachea, nonpalpable thyroid. LUNGS:  Clear to anterior auscultation bilaterally, non-labored  breathing. CARDIOVASCULAR:  Exam revealed S1 and S2, regular rate and rhythm. No  murmurs or rubs. No peripheral edema. ABDOMEN:  Exam revealed a soft abdomen. No organomegaly. SKIN:  Revealed no lesions or rashes. Warm to touch. PSYCHIATRIC:  Revealed good judgment and insight. LYMPHATICS:  Revealed no cervical or axillary adenopathies. ASSESSMENT AND PLAN:  1. Acute-on-chronic kidney disease. The patient has chronic kidney  disease with a baseline creatinine of 1.5 to 1.7 mg/dL. His acute  kidney injury is likely secondary to a prerenal component, although an  ATN injury cannot be ruled out at this time. RECOMMENDATIONS:  1. Hold diuretics. 2.  Attempt gentle volume expansion. 3.  Avoid all nephrotoxic agents at this time. 4.  Maintain systolic blood pressure above 120 mmHg. 5.  Hyponatremia secondary to hypovolemia, will start 0.9% normal saline  and monitor serial labs to avoid overcorrection. 6.  Hypertension, blood pressure within acceptable range. 7.  Anemia, stable hemoglobin, monitor.         Nereida Maguire MD    D: 10/21/2022 15:04:11       T: 10/21/2022 15:08:41     [email or web address]  Job#: [id number]     Doc#: [id number]    CC:

## 2022-10-21 NOTE — CARE COORDINATION
CASE MANAGEMENT INITIAL ASSESSMENT      Reviewed chart and completed assessment with patient:yes  Family present: NA  Explained Case Management role/services. yes    Primary contact information:Sloan Mejia Decision Maker :   Primary Decision Maker: Teetee Other - [phone]    Secondary Decision Maker: Scott De León Other - [phone]          Can this person be reached and be able to respond quickly, such as within a few minutes or hours? Yes      Admit date/status:10/21/22  Diagnosis:ARF   Is this a Readmission?:  Yes      Insurance:[employer] medicare  Precert required for SNF: Yes       3 night stay required: No    Living arrangements, Adls, care needs, prior to admission:from [address] at home:  Walker__Cane__RTS__ BSC__Shower Chair__  02__ HHN__ CPAP__  BiPap__  Hospital Bed__ W/C___ Other_____    Services in the home and/or outpatient, prior to admission:provided by facility    Current PCP:Dr. Lily Bobby                               Medications:yes Prescription coverage? Yes Will pt require financial assistance with medications No     Transportation needs: stretcher     Dialysis Facility (if applicable)   Name:  Address:  Dialysis Schedule:  Phone:  Fax:    PT/OT recs:    Hospital Exemption Notification (HEN):    Barriers to discharge:medical complications    Plan/comments:Referred to patient for d/c planning. Spoke to patient. Patient is a 75year old male admitted for ARF. Patient resides at [facility]. Plan to return on d/c. Facility updated. No other needs at this time.   Electronically signed by ABELINO Pichardo on 10/21/2022 at 4:55 PM      [address] on chart for MD signature

## 2022-10-21 NOTE — ED NOTES
Bed alarm on, non-skid socks placed on pt. Call light within reach.      Indra Chase RN  10/21/22 6803

## 2022-10-21 NOTE — ED PROVIDER NOTES
Emergency Department Attending Provider Note  Location: [facility]  ED  10/21/2022     Patient Identification  Adnan Suzanne Heimlich is a 75 y.o. male      Husam Barreto was evaluated in the Emergency Department for \"abnormal lab. \"Patient presents to the emergency department reporting fatigue. He denies fevers, chills, or sweats. He states he has been eating and drinking within normal limits but denies additional complaints. Per report, patient had abnormal renal function labs. . Although initial history and physical exam information was obtained by JENNIE/NPP/MD/ (who also dictated a record of this visit), I personally saw the patient and performed a substantive portion of the visit including all aspects of the medical decision making. PHYSICAL EXAM:  Somnolent but easily arousable. HEENT: Dry mucous membranes. Heart: Regular rate and rhythm. Normal S1-S2. Lungs: Clear to auscultation bilaterally. No wheezes, rales, rhonchi. Abdomen: Soft. Patient seen and evaluated. Relevant records reviewed. Abnormal BUN/creatinine. CLINICAL IMPRESSION  1. Acute renal failure superimposed on chronic kidney disease, unspecified CKD stage, unspecified acute renal failure type ([facility] [address]          I, Liz Thornton DO, am the primary clinician of record. I personally saw the patient and independently provided 0 minutes of non-concurrent critical care out of the total shared critical care time provided. This chart was generated in part by using Dragon Dictation system and may contain errors related to that system including errors in grammar, punctuation, and spelling, as well as words and phrases that may be inappropriate. If there are any questions or concerns please feel free to contact the dictating provider for clarification.      Jeovanny Casillas DO   [employer]       Jeovanny Casillas DO  10/21/22 7477

## 2022-10-22 LAB
ANION GAP SERPL CALCULATED.3IONS-SCNC: 11 MMOL/L (ref 3–16)
ANION GAP SERPL CALCULATED.3IONS-SCNC: 13 MMOL/L (ref 3–16)
ANION GAP SERPL CALCULATED.3IONS-SCNC: 8 MMOL/L (ref 3–16)
ANION GAP SERPL CALCULATED.3IONS-SCNC: 9 MMOL/L (ref 3–16)
BASOPHILS ABSOLUTE: 0 K/UL (ref 0–0.2)
BASOPHILS RELATIVE PERCENT: 0.6 %
BUN BLDV-MCNC: 121 MG/DL (ref 7–20)
BUN BLDV-MCNC: 131 MG/DL (ref 7–20)
BUN BLDV-MCNC: 132 MG/DL (ref 7–20)
BUN BLDV-MCNC: 136 MG/DL (ref 7–20)
CALCIUM SERPL-MCNC: 10.2 MG/DL (ref 8.3–10.6)
CALCIUM SERPL-MCNC: 10.2 MG/DL (ref 8.3–10.6)
CALCIUM SERPL-MCNC: 9.5 MG/DL (ref 8.3–10.6)
CALCIUM SERPL-MCNC: 9.9 MG/DL (ref 8.3–10.6)
CHLORIDE BLD-SCNC: 82 MMOL/L (ref 99–110)
CHLORIDE BLD-SCNC: 84 MMOL/L (ref 99–110)
CO2: 31 MMOL/L (ref 21–32)
CO2: 32 MMOL/L (ref 21–32)
CO2: 34 MMOL/L (ref 21–32)
CO2: 35 MMOL/L (ref 21–32)
CREAT SERPL-MCNC: 1.6 MG/DL (ref 0.8–1.3)
CREAT SERPL-MCNC: 1.7 MG/DL (ref 0.8–1.3)
CREAT SERPL-MCNC: 1.7 MG/DL (ref 0.8–1.3)
CREAT SERPL-MCNC: 1.8 MG/DL (ref 0.8–1.3)
EOSINOPHILS ABSOLUTE: 0.2 K/UL (ref 0–0.6)
EOSINOPHILS RELATIVE PERCENT: 2.6 %
GFR SERPL CREATININE-BSD FRML MDRD: 40 ML/MIN/{1.73_M2}
GFR SERPL CREATININE-BSD FRML MDRD: 43 ML/MIN/{1.73_M2}
GFR SERPL CREATININE-BSD FRML MDRD: 43 ML/MIN/{1.73_M2}
GFR SERPL CREATININE-BSD FRML MDRD: 46 ML/MIN/{1.73_M2}
GLUCOSE BLD-MCNC: 290 MG/DL (ref 70–99)
GLUCOSE BLD-MCNC: 295 MG/DL (ref 70–99)
GLUCOSE BLD-MCNC: 306 MG/DL (ref 70–99)
GLUCOSE BLD-MCNC: 335 MG/DL (ref 70–99)
GLUCOSE BLD-MCNC: 340 MG/DL (ref 70–99)
GLUCOSE BLD-MCNC: 345 MG/DL (ref 70–99)
GLUCOSE BLD-MCNC: 346 MG/DL (ref 70–99)
GLUCOSE BLD-MCNC: 393 MG/DL (ref 70–99)
GLUCOSE BLD-MCNC: 420 MG/DL (ref 70–99)
GLUCOSE BLD-MCNC: 436 MG/DL (ref 70–99)
HCT VFR BLD CALC: 36.4 % (ref 40.5–52.5)
HEMOGLOBIN: 12 G/DL (ref 13.5–17.5)
LYMPHOCYTES ABSOLUTE: 0.9 K/UL (ref 1–5.1)
LYMPHOCYTES RELATIVE PERCENT: 11.3 %
MAGNESIUM: 2.8 MG/DL (ref 1.8–2.4)
MCH RBC QN AUTO: 27.1 PG (ref 26–34)
MCHC RBC AUTO-ENTMCNC: 32.9 G/DL (ref 31–36)
MCV RBC AUTO: 82.5 FL (ref 80–100)
MONOCYTES ABSOLUTE: 0.6 K/UL (ref 0–1.3)
MONOCYTES RELATIVE PERCENT: 7.8 %
NEUTROPHILS ABSOLUTE: 6.3 K/UL (ref 1.7–7.7)
NEUTROPHILS RELATIVE PERCENT: 77.7 %
PDW BLD-RTO: 18.4 % (ref 12.4–15.4)
PERFORMED ON: ABNORMAL
PLATELET # BLD: 192 K/UL (ref 135–450)
PMV BLD AUTO: 9.3 FL (ref 5–10.5)
POTASSIUM SERPL-SCNC: 3.6 MMOL/L (ref 3.5–5.1)
POTASSIUM SERPL-SCNC: 3.6 MMOL/L (ref 3.5–5.1)
POTASSIUM SERPL-SCNC: 3.7 MMOL/L (ref 3.5–5.1)
POTASSIUM SERPL-SCNC: 3.7 MMOL/L (ref 3.5–5.1)
RBC # BLD: 4.41 M/UL (ref 4.2–5.9)
SODIUM BLD-SCNC: 124 MMOL/L (ref 136–145)
SODIUM BLD-SCNC: 126 MMOL/L (ref 136–145)
SODIUM BLD-SCNC: 128 MMOL/L (ref 136–145)
SODIUM BLD-SCNC: 129 MMOL/L (ref 136–145)
WBC # BLD: 8.1 K/UL (ref 4–11)

## 2022-10-22 PROCEDURE — G0378 HOSPITAL OBSERVATION PER HR: HCPCS

## 2022-10-22 PROCEDURE — 6370000000 HC RX 637 (ALT 250 FOR IP): Performed by: INTERNAL MEDICINE

## 2022-10-22 PROCEDURE — 85025 COMPLETE CBC W/AUTO DIFF WBC: CPT

## 2022-10-22 PROCEDURE — 2580000003 HC RX 258: Performed by: NURSE PRACTITIONER

## 2022-10-22 PROCEDURE — 80048 BASIC METABOLIC PNL TOTAL CA: CPT

## 2022-10-22 PROCEDURE — 96374 THER/PROPH/DIAG INJ IV PUSH: CPT

## 2022-10-22 PROCEDURE — 83735 ASSAY OF MAGNESIUM: CPT

## 2022-10-22 PROCEDURE — 6370000000 HC RX 637 (ALT 250 FOR IP): Performed by: HOSPITALIST

## 2022-10-22 PROCEDURE — 94761 N-INVAS EAR/PLS OXIMETRY MLT: CPT

## 2022-10-22 PROCEDURE — 36415 COLL VENOUS BLD VENIPUNCTURE: CPT

## 2022-10-22 PROCEDURE — 96361 HYDRATE IV INFUSION ADD-ON: CPT

## 2022-10-22 PROCEDURE — 6360000002 HC RX W HCPCS: Performed by: HOSPITALIST

## 2022-10-22 PROCEDURE — 6370000000 HC RX 637 (ALT 250 FOR IP): Performed by: NURSE PRACTITIONER

## 2022-10-22 PROCEDURE — 2700000000 HC OXYGEN THERAPY PER DAY

## 2022-10-22 PROCEDURE — 96372 THER/PROPH/DIAG INJ SC/IM: CPT

## 2022-10-22 RX ORDER — ENOXAPARIN SODIUM 100 MG/ML
40 INJECTION SUBCUTANEOUS DAILY
Status: DISCONTINUED | OUTPATIENT
Start: 2022-10-22 | End: 2022-10-25 | Stop reason: HOSPADM

## 2022-10-22 RX ADMIN — OXYCODONE AND ACETAMINOPHEN 1 TABLET: 325; 5 TABLET ORAL at 21:16

## 2022-10-22 RX ADMIN — SODIUM CHLORIDE 950 ML: 9 INJECTION, SOLUTION INTRAVENOUS at 06:45

## 2022-10-22 RX ADMIN — INSULIN LISPRO 12 UNITS: 100 INJECTION, SOLUTION INTRAVENOUS; SUBCUTANEOUS at 08:55

## 2022-10-22 RX ADMIN — INSULIN LISPRO 4 UNITS: 100 INJECTION, SOLUTION INTRAVENOUS; SUBCUTANEOUS at 21:17

## 2022-10-22 RX ADMIN — ATORVASTATIN CALCIUM 80 MG: 80 TABLET, FILM COATED ORAL at 21:16

## 2022-10-22 RX ADMIN — Medication 400 MG: at 10:18

## 2022-10-22 RX ADMIN — OXYCODONE AND ACETAMINOPHEN 1 TABLET: 325; 5 TABLET ORAL at 04:11

## 2022-10-22 RX ADMIN — OXYCODONE AND ACETAMINOPHEN 1 TABLET: 325; 5 TABLET ORAL at 13:37

## 2022-10-22 RX ADMIN — Medication 10 ML: at 21:23

## 2022-10-22 RX ADMIN — CARVEDILOL 3.12 MG: 3.12 TABLET, FILM COATED ORAL at 10:24

## 2022-10-22 RX ADMIN — FERROUS SULFATE TAB 325 MG (65 MG ELEMENTAL FE) 325 MG: 325 (65 FE) TAB at 21:16

## 2022-10-22 RX ADMIN — TAMSULOSIN HYDROCHLORIDE 0.4 MG: 0.4 CAPSULE ORAL at 06:17

## 2022-10-22 RX ADMIN — INSULIN LISPRO 8 UNITS: 100 INJECTION, SOLUTION INTRAVENOUS; SUBCUTANEOUS at 13:24

## 2022-10-22 RX ADMIN — CARVEDILOL 3.12 MG: 3.12 TABLET, FILM COATED ORAL at 18:12

## 2022-10-22 RX ADMIN — SALINE NASAL SPRAY 2 SPRAY: 1.5 SOLUTION NASAL at 16:36

## 2022-10-22 RX ADMIN — FERROUS SULFATE TAB 325 MG (65 MG ELEMENTAL FE) 325 MG: 325 (65 FE) TAB at 10:19

## 2022-10-22 RX ADMIN — INSULIN LISPRO 12 UNITS: 100 INJECTION, SOLUTION INTRAVENOUS; SUBCUTANEOUS at 18:11

## 2022-10-22 RX ADMIN — INSULIN HUMAN 10 UNITS: 100 INJECTION, SOLUTION PARENTERAL at 00:05

## 2022-10-22 RX ADMIN — ISOSORBIDE MONONITRATE 30 MG: 30 TABLET, EXTENDED RELEASE ORAL at 10:19

## 2022-10-22 RX ADMIN — ENOXAPARIN SODIUM 40 MG: 100 INJECTION SUBCUTANEOUS at 10:23

## 2022-10-22 RX ADMIN — INSULIN GLARGINE 35 UNITS: 100 INJECTION, SOLUTION SUBCUTANEOUS at 10:28

## 2022-10-22 RX ADMIN — POLYETHYLENE GLYCOL 3350 17 G: 17 POWDER, FOR SOLUTION ORAL at 21:29

## 2022-10-22 ASSESSMENT — PAIN DESCRIPTION - LOCATION
LOCATION: BACK

## 2022-10-22 ASSESSMENT — PAIN DESCRIPTION - DESCRIPTORS
DESCRIPTORS: ACHING
DESCRIPTORS: ACHING

## 2022-10-22 ASSESSMENT — PAIN DESCRIPTION - ORIENTATION: ORIENTATION: LOWER

## 2022-10-22 ASSESSMENT — PAIN SCALES - WONG BAKER: WONGBAKER_NUMERICALRESPONSE: 2

## 2022-10-22 ASSESSMENT — PAIN SCALES - GENERAL
PAINLEVEL_OUTOF10: 9
PAINLEVEL_OUTOF10: 5
PAINLEVEL_OUTOF10: 3
PAINLEVEL_OUTOF10: 8
PAINLEVEL_OUTOF10: 5
PAINLEVEL_OUTOF10: 5
PAINLEVEL_OUTOF10: 4
PAINLEVEL_OUTOF10: 6

## 2022-10-22 ASSESSMENT — PAIN DESCRIPTION - FREQUENCY: FREQUENCY: CONTINUOUS

## 2022-10-22 NOTE — PROGRESS NOTES
Department of Internal Medicine  Nephrology Progress Note        SUBJECTIVE:    We are following this patient for A/CKD. The patient was seen and examined; he was resting comfortably today with no acute events noted overnight. ROS: No fever or chills. Social: No family at bedside. Physical Exam:    VITALS:  /60   Pulse 68   Temp 97.5 °F (36.4 °C) (Axillary)   Resp 18   Ht 5' 9\" (1.753 m)   Wt 194 lb (88 kg)   SpO2 100%   BMI 28.65 kg/m²     General appearance: Seems comfortable, no acute distress. Neck: Trachea midline, thyroid normal.   Lungs:  Non labored breathing, CTA to anterior auscultation. Heart:  S1S2 normal, rub or gallop. No peripheral edema. Abdomen: Soft, non-tender, no organomegaly. Skin: No lesions or rashes, warm to touch.      DATA:    CBC with Differential:    Lab Results   Component Value Date/Time    WBC 8.1 10/22/2022 07:01 AM    RBC 4.41 10/22/2022 07:01 AM    HGB 12.0 10/22/2022 07:01 AM    HCT 36.4 10/22/2022 07:01 AM     10/22/2022 07:01 AM    MCV 82.5 10/22/2022 07:01 AM    MCH 27.1 10/22/2022 07:01 AM    MCHC 32.9 10/22/2022 07:01 AM    RDW 18.4 10/22/2022 07:01 AM    SEGSPCT 81 10/05/2016 12:04 PM    BANDSPCT 12.0 03/02/2012 06:30 AM    LYMPHOPCT 11.3 10/22/2022 07:01 AM    MONOPCT 7.8 10/22/2022 07:01 AM    EOSPCT 1.0 05/26/2022 12:00 AM    BASOPCT 0.6 10/22/2022 07:01 AM    MONOSABS 0.6 10/22/2022 07:01 AM    LYMPHSABS 0.9 10/22/2022 07:01 AM    EOSABS 0.2 10/22/2022 07:01 AM    BASOSABS 0.0 10/22/2022 07:01 AM    DIFFTYPE Auto-K 02/04/2013 10:11 AM     BMP:    Lab Results   Component Value Date/Time     10/22/2022 07:01 AM    K 3.7 10/22/2022 07:01 AM    K 4.0 10/21/2022 11:50 AM    CL 84 10/22/2022 07:01 AM    CO2 32 10/22/2022 07:01 AM     10/22/2022 07:01 AM    LABALBU 4.1 10/21/2022 11:50 AM    CREATININE 1.6 10/22/2022 07:01 AM    CALCIUM 10.2 10/22/2022 07:01 AM    GFRAA 43 09/01/2022 08:20 AM    GFRAA 53 06/06/2013 02:22 PM LABGLOM 46 10/22/2022 07:01 AM    GLUCOSE 346 10/22/2022 07:01 AM       IMPRESSION/RECOMMENDATIONS:       1- A/CKD: The patient has chronic kidney disease with a baseline creatinine of 1.5 to 1.7 mg/dl. His MARGARITO is likely secondary to a pre-renal component, although an ATN injury cannot be ruled out at this time. His urinary output is well maintained and his serum creatinine/BUN are slowly trending down; we will continue gentle volume expansion and monitor. 2- Hyponatremia: Secondary to hypovolemia and improving with isotonic saline. 3- HTN: Blood pressure within acceptable range. 4- Anemia: Stable hemoglobin, monitor.

## 2022-10-22 NOTE — PROGRESS NOTES
Hospitalist Progress Note      PCP: Justa Velarde MD    Date of Admission: 10/21/2022    Chief Complaint: Abnormal lab work     Hospital Course: This is a 65-year-old male with a past medical history of chronic kidney disease hypertension, coronary artery disease obesity, CHF presented to [facility] with abnormal lab work, in the emergency room patient was noted to have BUN of 154 creatinine of 1.9 nephrology consulted and following    Subjective: Patient is sitting denies any chest pain or shortness of breath no nausea vomiting stated has been nursing home for 7 months      Medications:  Reviewed    Infusion Medications    sodium chloride 950 mL (10/22/22 0645)    sodium chloride      dextrose       Scheduled Medications    atorvastatin  80 mg Oral Nightly    carvedilol  3.125 mg Oral BID WC    ferrous sulfate  325 mg Oral BID    insulin glargine  35 Units SubCUTAneous Daily    isosorbide mononitrate  30 mg Oral Daily    linaclotide  290 mcg Oral QAM AC    magnesium oxide  400 mg Oral BID    tamsulosin  0.4 mg Oral Daily    sodium chloride flush  5-40 mL IntraVENous 2 times per day    enoxaparin  30 mg SubCUTAneous BID    insulin lispro  0-16 Units SubCUTAneous TID WC    insulin lispro  0-4 Units SubCUTAneous Nightly     PRN Meds: acetaminophen, oxyCODONE-acetaminophen, sodium chloride flush, sodium chloride, ondansetron **OR** ondansetron, polyethylene glycol, acetaminophen **OR** acetaminophen, glucose, dextrose bolus **OR** dextrose bolus, glucagon (rDNA), dextrose      Intake/Output Summary (Last 24 hours) at 10/22/2022 0903  Last data filed at 10/22/2022 7065  Gross per 24 hour   Intake 582 ml   Output 1950 ml   Net -1368 ml       Physical Exam Performed:    /63   Pulse 82   Temp 97.5 °F (36.4 °C) (Axillary)   Resp 18   Ht 5' 9\" (1.753 m)   Wt 194 lb (88 kg)   SpO2 100%   BMI 28.65 kg/m²     General appearance: No apparent distress, appears stated age and cooperative.   HEENT: Pupils equal, round, and reactive to light. Conjunctivae/corneas clear. Neck: Supple, with full range of motion. No jugular venous distention. Trachea midline. Respiratory:  Normal respiratory effort. Clear to auscultation, bilaterally without Rales/Wheezes/Rhonchi. Cardiovascular: Regular rate and rhythm with normal S1/S2 without murmurs, rubs or gallops. Abdomen: Soft, non-tender, non-distended with normal bowel sounds. Musculoskeletal: No clubbing, cyanosis or edema bilaterally. Full range of motion without deformity. Chronic venous stasis bilateral lower extremity. Skin: Skin color, texture, turgor normal.  No rashes or lesions. Neurologic:  Neurovascularly intact without any focal sensory/motor deficits. Cranial nerves: II-XII intact, grossly non-focal.  Psychiatric: Alert and oriented, thought content appropriate, normal insight  Capillary Refill: Brisk, 3 seconds, normal   Peripheral Pulses: +2 palpable, equal bilaterally       Labs:   Recent Labs     10/21/22  1150 10/22/22  0701   WBC 8.1 8.1   HGB 11.9* 12.0*   HCT 35.5* 36.4*    192     Recent Labs     10/21/22  2041 10/22/22  0112 10/22/22  0701   * 124* 129*   K 3.8 3.7 3.7   CL 82* 82* 84*   CO2 31 31 32   * 131* 136*   CREATININE 1.7* 1.7* 1.6*   CALCIUM 9.9 9.9 10.2     Recent Labs     10/21/22  1150   AST 27   ALT 19   BILITOT 1.0   ALKPHOS 104     No results for input(s): INR in the last 72 hours. No results for input(s): Othelia Nutley in the last 72 hours.     Urinalysis:      Lab Results   Component Value Date/Time    NITRU Negative 10/21/2022 01:20 PM    WBCUA 0-2 11/05/2013 10:31 AM    BACTERIA Rare 11/05/2013 10:31 AM    RBCUA 0-2 11/05/2013 10:31 AM    BLOODU Negative 10/21/2022 01:20 PM    SPECGRAV 1.010 10/21/2022 01:20 PM    GLUCOSEU 250 10/21/2022 01:20 PM    GLUCOSEU NEGATIVE 02/29/2012 03:17 PM       Radiology:  No orders to display           Assessment/Plan:    Active Hospital Problems    Diagnosis Dehydration [E86.0]      Priority: Medium    Obesity [E66.9]     CKD (chronic kidney disease) stage 3, GFR 30-59 ml/min ([facility]) [N18.30]     Essential hypertension [I10]     DM (diabetes mellitus) ([facility] [address] [E11.9]     Coronary artery disease involving native coronary artery of native heart without angina pectoris [I25.10]      This is a 69-year-old male admitted with dehydration with a chronic kidney disease stage III continue with IV fluid nephrology consulted and following hold diuretic check daily BMP. Hypertension continue with home medication monitor blood pressure closely. Hyperlipidemia on statin. Diabetes mellitus type 2 continue with current care check hemoglobin A1c. Diastolic CHF Aldactone and torsemide on hold. Chronic normocytic anemia monitor closely. Chronic venous stasis bilateral lower extremity wound care consult. DVT Prophylaxis: Heparin subcu  Diet: ADULT DIET;  Regular; 3 carb choices (45 gm/meal)  Code Status: Limited  PT/OT Eval Status:     Dispo -     Appropriate for A1 Discharge Unit: No      Shruthi Rahman MD

## 2022-10-22 NOTE — PROGRESS NOTES
Patient's EF (Ejection Fraction) is greater than 40%    Heart Failure Medications:  Diuretics[de-identified] None    (One of the following REQUIRED for EF </= 40%/SYSTOLIC FAILURE but MAY be used in EF% >40%/DIASTOLIC FAILURE)        ACE[de-identified] None        ARB[de-identified] None         ARNI[de-identified] None    (Beta Blockers)  NON- Evidenced Based Beta Blocker (for EF% >40%/DIASTOLIC FAILURE): None    Evidenced Based Beta Blocker::(REQUIRED for EF% <40%/SYSTOLIC FAILURE) Carvedilol- Coreg  . .................................................................................................................................................. Patient's weights and intake/output reviewed: Yes    Patient's Last Weight: 194 lbs obtained by standing scale. Intake/Output Summary (Last 24 hours) at 10/22/2022 0323  Last data filed at 10/22/2022 0141  Gross per 24 hour   Intake 240 ml   Output 1450 ml   Net -1210 ml       Education Booklet Provided: yes    Comorbidities Reviewed Yes    Patient has a past medical history of Anemia, Angina, Arthritis, CAD (coronary artery disease), CHF (congestive heart failure) ([address] CKD (chronic kidney disease) stage 3, GFR 30-59 ml/min ([address] Clostridium difficile diarrhea, Diabetes mellitus ([address] Diabetic neuropathy ([address] Disease of blood and blood forming organ, Dizziness, GERD (gastroesophageal reflux disease), Headache, Hearing loss, Hyperlipidemia, Hypertension, Kidney stone, 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 >>For CHF and Comorbidity documentation on Education Time and Topics, please see Education Tab    Progressive Mobility Assessment:  What is this patient's Current Level of Mobility?: Ambulatory- with Assistance  How was this patient Mobilized today?: Edge of Bed, Up to Chair,  Up to Toilet/Shower, and Up in Room, ambulated 50 ft                 With Whom?  Nurse                 Level of Difficulty/Assistance: 1x Assist Pt resting in bed at this time on  2 L O2. Pt denies shortness of breath. Pt without lower extremity edema.      Patient and/or Family's stated Goal of Care this Admission: reduce shortness of breath, increase activity tolerance, better understand heart failure and disease management, and be more comfortable prior to discharge        :

## 2022-10-23 LAB
ANION GAP SERPL CALCULATED.3IONS-SCNC: 10 MMOL/L (ref 3–16)
ANION GAP SERPL CALCULATED.3IONS-SCNC: 8 MMOL/L (ref 3–16)
ANION GAP SERPL CALCULATED.3IONS-SCNC: 9 MMOL/L (ref 3–16)
ANION GAP SERPL CALCULATED.3IONS-SCNC: 9 MMOL/L (ref 3–16)
BUN BLDV-MCNC: 106 MG/DL (ref 7–20)
BUN BLDV-MCNC: 109 MG/DL (ref 7–20)
BUN BLDV-MCNC: 121 MG/DL (ref 7–20)
BUN BLDV-MCNC: 89 MG/DL (ref 7–20)
CALCIUM SERPL-MCNC: 10.2 MG/DL (ref 8.3–10.6)
CALCIUM SERPL-MCNC: 9.1 MG/DL (ref 8.3–10.6)
CALCIUM SERPL-MCNC: 9.7 MG/DL (ref 8.3–10.6)
CALCIUM SERPL-MCNC: 9.9 MG/DL (ref 8.3–10.6)
CHLORIDE BLD-SCNC: 87 MMOL/L (ref 99–110)
CHLORIDE BLD-SCNC: 88 MMOL/L (ref 99–110)
CHLORIDE BLD-SCNC: 89 MMOL/L (ref 99–110)
CHLORIDE BLD-SCNC: 91 MMOL/L (ref 99–110)
CO2: 30 MMOL/L (ref 21–32)
CO2: 31 MMOL/L (ref 21–32)
CO2: 32 MMOL/L (ref 21–32)
CO2: 34 MMOL/L (ref 21–32)
CREAT SERPL-MCNC: 1.5 MG/DL (ref 0.8–1.3)
CREAT SERPL-MCNC: 1.6 MG/DL (ref 0.8–1.3)
GFR SERPL CREATININE-BSD FRML MDRD: 46 ML/MIN/{1.73_M2}
GFR SERPL CREATININE-BSD FRML MDRD: 50 ML/MIN/{1.73_M2}
GLUCOSE BLD-MCNC: 288 MG/DL (ref 70–99)
GLUCOSE BLD-MCNC: 290 MG/DL (ref 70–99)
GLUCOSE BLD-MCNC: 307 MG/DL (ref 70–99)
GLUCOSE BLD-MCNC: 313 MG/DL (ref 70–99)
GLUCOSE BLD-MCNC: 326 MG/DL (ref 70–99)
GLUCOSE BLD-MCNC: 336 MG/DL (ref 70–99)
GLUCOSE BLD-MCNC: 393 MG/DL (ref 70–99)
GLUCOSE BLD-MCNC: 444 MG/DL (ref 70–99)
GLUCOSE BLD-MCNC: 450 MG/DL (ref 70–99)
MAGNESIUM: 2.6 MG/DL (ref 1.8–2.4)
PERFORMED ON: ABNORMAL
POTASSIUM SERPL-SCNC: 3.6 MMOL/L (ref 3.5–5.1)
POTASSIUM SERPL-SCNC: 3.7 MMOL/L (ref 3.5–5.1)
POTASSIUM SERPL-SCNC: 3.7 MMOL/L (ref 3.5–5.1)
POTASSIUM SERPL-SCNC: 4.1 MMOL/L (ref 3.5–5.1)
SODIUM BLD-SCNC: 128 MMOL/L (ref 136–145)
SODIUM BLD-SCNC: 129 MMOL/L (ref 136–145)
SODIUM BLD-SCNC: 129 MMOL/L (ref 136–145)
SODIUM BLD-SCNC: 132 MMOL/L (ref 136–145)

## 2022-10-23 PROCEDURE — 83735 ASSAY OF MAGNESIUM: CPT

## 2022-10-23 PROCEDURE — 6370000000 HC RX 637 (ALT 250 FOR IP): Performed by: NURSE PRACTITIONER

## 2022-10-23 PROCEDURE — 36415 COLL VENOUS BLD VENIPUNCTURE: CPT

## 2022-10-23 PROCEDURE — 2580000003 HC RX 258: Performed by: NURSE PRACTITIONER

## 2022-10-23 PROCEDURE — 94761 N-INVAS EAR/PLS OXIMETRY MLT: CPT

## 2022-10-23 PROCEDURE — 6370000000 HC RX 637 (ALT 250 FOR IP): Performed by: INTERNAL MEDICINE

## 2022-10-23 PROCEDURE — 2700000000 HC OXYGEN THERAPY PER DAY

## 2022-10-23 PROCEDURE — 96372 THER/PROPH/DIAG INJ SC/IM: CPT

## 2022-10-23 PROCEDURE — 80048 BASIC METABOLIC PNL TOTAL CA: CPT

## 2022-10-23 PROCEDURE — 96361 HYDRATE IV INFUSION ADD-ON: CPT

## 2022-10-23 PROCEDURE — G0378 HOSPITAL OBSERVATION PER HR: HCPCS

## 2022-10-23 PROCEDURE — 6360000002 HC RX W HCPCS: Performed by: HOSPITALIST

## 2022-10-23 RX ORDER — PREGABALIN 25 MG/1
25 CAPSULE ORAL EVERY 12 HOURS PRN
COMMUNITY

## 2022-10-23 RX ORDER — CYCLOBENZAPRINE HCL 5 MG
5 TABLET ORAL EVERY 8 HOURS PRN
COMMUNITY

## 2022-10-23 RX ORDER — PROPYLENE GLYCOL/PEG 400 0.3 %-0.4%
1 DROPS OPHTHALMIC (EYE) 4 TIMES DAILY PRN
COMMUNITY

## 2022-10-23 RX ORDER — POLYETHYLENE GLYCOL 3350 17 G/17G
17 POWDER, FOR SOLUTION ORAL DAILY PRN
COMMUNITY

## 2022-10-23 RX ORDER — ACETAMINOPHEN 325 MG/1
650 TABLET ORAL EVERY 4 HOURS PRN
COMMUNITY

## 2022-10-23 RX ORDER — INSULIN LISPRO 100 [IU]/ML
INJECTION, SOLUTION INTRAVENOUS; SUBCUTANEOUS 3 TIMES DAILY
COMMUNITY

## 2022-10-23 RX ORDER — MECLIZINE HYDROCHLORIDE 25 MG/1
25 TABLET ORAL EVERY 8 HOURS PRN
COMMUNITY

## 2022-10-23 RX ORDER — INSULIN GLARGINE 100 [IU]/ML
40 INJECTION, SOLUTION SUBCUTANEOUS DAILY
COMMUNITY

## 2022-10-23 RX ADMIN — INSULIN LISPRO 4 UNITS: 100 INJECTION, SOLUTION INTRAVENOUS; SUBCUTANEOUS at 23:38

## 2022-10-23 RX ADMIN — Medication 10 ML: at 20:27

## 2022-10-23 RX ADMIN — SALINE NASAL SPRAY 2 SPRAY: 1.5 SOLUTION NASAL at 09:53

## 2022-10-23 RX ADMIN — OXYCODONE AND ACETAMINOPHEN 1 TABLET: 325; 5 TABLET ORAL at 20:27

## 2022-10-23 RX ADMIN — ISOSORBIDE MONONITRATE 30 MG: 30 TABLET, EXTENDED RELEASE ORAL at 09:28

## 2022-10-23 RX ADMIN — CARVEDILOL 3.12 MG: 3.12 TABLET, FILM COATED ORAL at 09:28

## 2022-10-23 RX ADMIN — FERROUS SULFATE TAB 325 MG (65 MG ELEMENTAL FE) 325 MG: 325 (65 FE) TAB at 20:27

## 2022-10-23 RX ADMIN — INSULIN LISPRO 8 UNITS: 100 INJECTION, SOLUTION INTRAVENOUS; SUBCUTANEOUS at 13:12

## 2022-10-23 RX ADMIN — TAMSULOSIN HYDROCHLORIDE 0.4 MG: 0.4 CAPSULE ORAL at 06:49

## 2022-10-23 RX ADMIN — OXYCODONE AND ACETAMINOPHEN 1 TABLET: 325; 5 TABLET ORAL at 06:49

## 2022-10-23 RX ADMIN — INSULIN LISPRO 12 UNITS: 100 INJECTION, SOLUTION INTRAVENOUS; SUBCUTANEOUS at 18:17

## 2022-10-23 RX ADMIN — CARVEDILOL 3.12 MG: 3.12 TABLET, FILM COATED ORAL at 18:18

## 2022-10-23 RX ADMIN — INSULIN LISPRO 8 UNITS: 100 INJECTION, SOLUTION INTRAVENOUS; SUBCUTANEOUS at 09:30

## 2022-10-23 RX ADMIN — OXYCODONE AND ACETAMINOPHEN 1 TABLET: 325; 5 TABLET ORAL at 14:33

## 2022-10-23 RX ADMIN — FERROUS SULFATE TAB 325 MG (65 MG ELEMENTAL FE) 325 MG: 325 (65 FE) TAB at 09:28

## 2022-10-23 RX ADMIN — ENOXAPARIN SODIUM 40 MG: 100 INJECTION SUBCUTANEOUS at 09:28

## 2022-10-23 RX ADMIN — ATORVASTATIN CALCIUM 80 MG: 80 TABLET, FILM COATED ORAL at 20:27

## 2022-10-23 RX ADMIN — INSULIN GLARGINE 35 UNITS: 100 INJECTION, SOLUTION SUBCUTANEOUS at 09:30

## 2022-10-23 ASSESSMENT — PAIN SCALES - GENERAL
PAINLEVEL_OUTOF10: 8
PAINLEVEL_OUTOF10: 6
PAINLEVEL_OUTOF10: 6
PAINLEVEL_OUTOF10: 2
PAINLEVEL_OUTOF10: 6
PAINLEVEL_OUTOF10: 2
PAINLEVEL_OUTOF10: 0

## 2022-10-23 ASSESSMENT — PAIN SCALES - WONG BAKER
WONGBAKER_NUMERICALRESPONSE: 2
WONGBAKER_NUMERICALRESPONSE: 2

## 2022-10-23 ASSESSMENT — PAIN DESCRIPTION - DESCRIPTORS: DESCRIPTORS: ACHING

## 2022-10-23 NOTE — PROGRESS NOTES
Department of Internal Medicine  Nephrology Progress Note        SUBJECTIVE:    We are following this patient for A/CKD. The patient was seen and examined; he feels well today with no CP, SOB, nausea or vomiting. ROS: No fever or chills. Social: No family at bedside. Physical Exam:    VITALS:  BP (!) 117/55   Pulse 72   Temp 97.8 °F (36.6 °C) (Oral)   Resp 18   Ht 5' 9\" (1.753 m)   Wt 209 lb 4.8 oz (94.9 kg)   SpO2 100%   BMI 30.91 kg/m²     General appearance: Seems comfortable, no acute distress. Neck: Trachea midline, thyroid normal.   Lungs:  Non labored breathing, CTA to anterior auscultation. Heart:  S1S2 normal, rub or gallop. No peripheral edema. Abdomen: Soft, non-tender, no organomegaly. Skin: No lesions or rashes, warm to touch.      DATA:    CBC with Differential:    Lab Results   Component Value Date/Time    WBC 8.1 10/22/2022 07:01 AM    RBC 4.41 10/22/2022 07:01 AM    HGB 12.0 10/22/2022 07:01 AM    HCT 36.4 10/22/2022 07:01 AM     10/22/2022 07:01 AM    MCV 82.5 10/22/2022 07:01 AM    MCH 27.1 10/22/2022 07:01 AM    MCHC 32.9 10/22/2022 07:01 AM    RDW 18.4 10/22/2022 07:01 AM    SEGSPCT 81 10/05/2016 12:04 PM    BANDSPCT 12.0 03/02/2012 06:30 AM    LYMPHOPCT 11.3 10/22/2022 07:01 AM    MONOPCT 7.8 10/22/2022 07:01 AM    EOSPCT 1.0 05/26/2022 12:00 AM    BASOPCT 0.6 10/22/2022 07:01 AM    MONOSABS 0.6 10/22/2022 07:01 AM    LYMPHSABS 0.9 10/22/2022 07:01 AM    EOSABS 0.2 10/22/2022 07:01 AM    BASOSABS 0.0 10/22/2022 07:01 AM    DIFFTYPE Auto-K 02/04/2013 10:11 AM     BMP:    Lab Results   Component Value Date/Time     10/23/2022 07:34 AM    K 3.7 10/23/2022 07:34 AM    K 4.0 10/21/2022 11:50 AM    CL 89 10/23/2022 07:34 AM    CO2 34 10/23/2022 07:34 AM     10/23/2022 07:34 AM    LABALBU 4.1 10/21/2022 11:50 AM    CREATININE 1.5 10/23/2022 07:34 AM    CALCIUM 10.2 10/23/2022 07:34 AM    GFRAA 43 09/01/2022 08:20 AM    GFRAA 53 06/06/2013 02:22 PM    LABGLOM 50 10/23/2022 07:34 AM    GLUCOSE 313 10/23/2022 07:34 AM       IMPRESSION/RECOMMENDATIONS:       1- A/CKD: The patient has chronic kidney disease with a baseline creatinine of 1.5 to 1.7 mg/dl. His MARGARITO is likely secondary to a pre-renal component, although an ATN injury cannot be ruled out at this time. His urinary output is well maintained and his serum creatinine/BUN are slowly trending down; we will discontinue IV fluids and monitor. 2- Hyponatremia: Secondary to hypovolemia and improving with isotonic saline. 3- HTN: Blood pressure within acceptable range. 4- Anemia: Stable hemoglobin, monitor.

## 2022-10-23 NOTE — PROGRESS NOTES
Hospitalist Progress Note      PCP: Rayburn Schwab, MD    Date of Admission: 10/21/2022    Chief Complaint: Abnormal lab work    Hospital Course:   This is a 59-year-old male with a past medical history of chronic kidney disease hypertension, coronary artery disease obesity, CHF presented to [address] [place] [address] with abnormal lab work, in the emergency room patient was noted to have BUN of 154 creatinine of 1.9 nephrology consulted and following    Subjective: Patient denies any chest pain no shortness of breath no nausea vomiting stated history of chronic pain on Lyrica 25 mg 1 p.o. twice daily      Medications:  Reviewed    Infusion Medications    sodium chloride 75 mL/hr at 10/22/22 1320    sodium chloride      dextrose       Scheduled Medications    enoxaparin  40 mg SubCUTAneous Daily    atorvastatin  80 mg Oral Nightly    carvedilol  3.125 mg Oral BID WC    ferrous sulfate  325 mg Oral BID    insulin glargine  35 Units SubCUTAneous Daily    isosorbide mononitrate  30 mg Oral Daily    linaclotide  290 mcg Oral QAM AC    tamsulosin  0.4 mg Oral Daily    sodium chloride flush  5-40 mL IntraVENous 2 times per day    insulin lispro  0-16 Units SubCUTAneous TID WC    insulin lispro  0-4 Units SubCUTAneous Nightly     PRN Meds: magnesium hydroxide, sodium chloride, acetaminophen, oxyCODONE-acetaminophen, sodium chloride flush, sodium chloride, ondansetron **OR** ondansetron, polyethylene glycol, acetaminophen **OR** acetaminophen, glucose, dextrose bolus **OR** dextrose bolus, glucagon (rDNA), dextrose      Intake/Output Summary (Last 24 hours) at 10/23/2022 0954  Last data filed at 10/23/2022 0920  Gross per 24 hour   Intake 1925 ml   Output 3650 ml   Net -1725 ml       Physical Exam Performed:    BP (!) 117/55   Pulse 72   Temp 97.8 °F (36.6 °C) (Oral)   Resp 18   Ht 5' 9\" (1.753 m)   Wt 209 lb 4.8 oz (94.9 kg)   SpO2 100%   BMI 30.91 kg/m²     General appearance: No apparent distress, appears stated age and cooperative. HEENT: Pupils equal, round, and reactive to light. Conjunctivae/corneas clear. Neck: Supple, with full range of motion. No jugular venous distention. Trachea midline. Respiratory:  Normal respiratory effort. Clear to auscultation, bilaterally without Rales/Wheezes/Rhonchi. Cardiovascular: Regular rate and rhythm with normal S1/S2 without murmurs, rubs or gallops. Abdomen: Soft, non-tender, non-distended with normal bowel sounds. Musculoskeletal: No clubbing, cyanosis or edema bilaterally. Full range of motion without deformity. Ace wrap bilateral lower extremity  Skin: Skin color, texture, turgor normal.  No rashes or lesions. Neurologic:  Neurovascularly intact without any focal sensory/motor deficits. Cranial nerves: II-XII intact, grossly non-focal.  Psychiatric: Alert and oriented, thought content appropriate, normal insight  Capillary Refill: Brisk, 3 seconds, normal   Peripheral Pulses: +2 palpable, equal bilaterally       Labs:   Recent Labs     10/21/22  1150 10/22/22  0701   WBC 8.1 8.1   HGB 11.9* 12.0*   HCT 35.5* 36.4*    192     Recent Labs     10/22/22  1912 10/23/22  0133 10/23/22  0734   * 128* 132*   K 3.6 4.1 3.7   CL 84* 87* 89*   CO2 34* 32 34*   * 121* 109*   CREATININE 1.8* 1.6* 1.5*   CALCIUM 9.5 9.9 10.2     Recent Labs     10/21/22  1150   AST 27   ALT 19   BILITOT 1.0   ALKPHOS 104     No results for input(s): INR in the last 72 hours. No results for input(s): Ashly Nasuti in the last 72 hours.     Urinalysis:      Lab Results   Component Value Date/Time    NITRU Negative 10/21/2022 01:20 PM    [address] 0-2 11/05/2013 10:31 AM    BACTERIA Rare 11/05/2013 10:31 AM    RBCUA 0-2 11/05/2013 10:31 AM    BLOODU Negative 10/21/2022 01:20 PM    SPECGRAV 1.010 10/21/2022 01:20 PM    GLUCOSEU 250 10/21/2022 01:20 PM    GLUCOSEU NEGATIVE 02/29/2012 03:17 PM       Radiology:  No orders to display           Assessment/Plan:    Active Hospital Problems Diagnosis     Dehydration [E86.0]      Priority: Medium    Obesity [E66.9]     CKD (chronic kidney disease) stage 3, GFR 30-59 ml/min ([facility]) [N18.30]     Essential hypertension [I10]     DM (diabetes mellitus) ([facility] [address] [E11.9]     Coronary artery disease involving native coronary artery of native heart without angina pectoris [I25.10]      This is a 59-year-old male admitted with dehydration with a chronic kidney disease stage III continue with IV fluid nephrology consulted and following hold diuretic check daily BMP. Today creatinine 1.5  Hypertension continue with home medication monitor blood pressure closely. Hyperlipidemia on statin. Diabetes mellitus type 2 continue with current care check hemoglobin F2Y pending  Diastolic CHF Aldactone and torsemide on hold. Chronic normocytic anemia monitor closely. Chronic venous stasis bilateral lower extremity wound care consult. History of neuropathy chronic pain continue with home medication. DVT Prophylaxis: Heparin subcu  Diet: ADULT DIET;  Regular; 3 carb choices (45 gm/meal)  Code Status: Limited  PT/OT Eval Status: Consulted    Dispo -     Appropriate for A1 Discharge Unit: Sierra Westfall MD

## 2022-10-23 NOTE — PLAN OF CARE
Problem: Discharge Planning  Goal: Discharge to home or other facility with appropriate resources  Outcome: Progressing     Problem: Pain  Goal: Verbalizes/displays adequate comfort level or baseline comfort level  Outcome: Progressing     Problem: Skin/Tissue Integrity  Goal: Absence of new skin breakdown  Description: 1. Monitor for areas of redness and/or skin breakdown  2. Assess vascular access sites hourly  3. Every 4-6 hours minimum:  Change oxygen saturation probe site  4. Every 4-6 hours:  If on nasal continuous positive airway pressure, respiratory therapy assess nares and determine need for appliance change or resting period.   Outcome: Progressing     Problem: ABCDS Injury Assessment  Goal: Absence of physical injury  Outcome: Progressing     Problem: Safety - Adult  Goal: Free from fall injury  Outcome: Progressing     Problem: Chronic Conditions and Co-morbidities  Goal: Patient's chronic conditions and co-morbidity symptoms are monitored and maintained or improved  Outcome: Progressing     Problem: Respiratory - Adult  Goal: Achieves optimal ventilation and oxygenation  Outcome: Progressing     Problem: Cardiovascular - Adult  Goal: Maintains optimal cardiac output and hemodynamic stability  Outcome: Progressing  Goal: Absence of cardiac dysrhythmias or at baseline  Outcome: Progressing     Problem: Skin/Tissue Integrity - Adult  Goal: Skin integrity remains intact  Outcome: Progressing     Problem: Musculoskeletal - Adult  Goal: Return mobility to safest level of function  Outcome: Progressing     Problem: Metabolic/Fluid and Electrolytes - Adult  Goal: Electrolytes maintained within normal limits  Outcome: Progressing     Problem: Hematologic - Adult  Goal: Maintains hematologic stability  Outcome: Progressing

## 2022-10-24 ENCOUNTER — APPOINTMENT (OUTPATIENT)
Dept: GENERAL RADIOLOGY | Age: 67
End: 2022-10-24
Payer: MEDICARE

## 2022-10-24 LAB
ANION GAP SERPL CALCULATED.3IONS-SCNC: 7 MMOL/L (ref 3–16)
ANION GAP SERPL CALCULATED.3IONS-SCNC: 8 MMOL/L (ref 3–16)
BUN BLDV-MCNC: 76 MG/DL (ref 7–20)
BUN BLDV-MCNC: 82 MG/DL (ref 7–20)
CALCIUM SERPL-MCNC: 9.7 MG/DL (ref 8.3–10.6)
CALCIUM SERPL-MCNC: 9.9 MG/DL (ref 8.3–10.6)
CHLORIDE BLD-SCNC: 91 MMOL/L (ref 99–110)
CHLORIDE BLD-SCNC: 92 MMOL/L (ref 99–110)
CO2: 30 MMOL/L (ref 21–32)
CO2: 31 MMOL/L (ref 21–32)
CREAT SERPL-MCNC: 1.3 MG/DL (ref 0.8–1.3)
CREAT SERPL-MCNC: 1.5 MG/DL (ref 0.8–1.3)
GFR SERPL CREATININE-BSD FRML MDRD: 50 ML/MIN/{1.73_M2}
GFR SERPL CREATININE-BSD FRML MDRD: 60 ML/MIN/{1.73_M2}
GLUCOSE BLD-MCNC: 193 MG/DL (ref 70–99)
GLUCOSE BLD-MCNC: 239 MG/DL (ref 70–99)
GLUCOSE BLD-MCNC: 266 MG/DL (ref 70–99)
GLUCOSE BLD-MCNC: 276 MG/DL (ref 70–99)
GLUCOSE BLD-MCNC: 303 MG/DL (ref 70–99)
GLUCOSE BLD-MCNC: 372 MG/DL (ref 70–99)
MAGNESIUM: 2.4 MG/DL (ref 1.8–2.4)
PERFORMED ON: ABNORMAL
POTASSIUM SERPL-SCNC: 3.6 MMOL/L (ref 3.5–5.1)
POTASSIUM SERPL-SCNC: 3.9 MMOL/L (ref 3.5–5.1)
PRO-BNP: 2317 PG/ML (ref 0–124)
SODIUM BLD-SCNC: 129 MMOL/L (ref 136–145)
SODIUM BLD-SCNC: 130 MMOL/L (ref 136–145)

## 2022-10-24 PROCEDURE — 97535 SELF CARE MNGMENT TRAINING: CPT

## 2022-10-24 PROCEDURE — 36415 COLL VENOUS BLD VENIPUNCTURE: CPT

## 2022-10-24 PROCEDURE — 80048 BASIC METABOLIC PNL TOTAL CA: CPT

## 2022-10-24 PROCEDURE — 96372 THER/PROPH/DIAG INJ SC/IM: CPT

## 2022-10-24 PROCEDURE — 6370000000 HC RX 637 (ALT 250 FOR IP): Performed by: INTERNAL MEDICINE

## 2022-10-24 PROCEDURE — G0378 HOSPITAL OBSERVATION PER HR: HCPCS

## 2022-10-24 PROCEDURE — 6370000000 HC RX 637 (ALT 250 FOR IP): Performed by: NURSE PRACTITIONER

## 2022-10-24 PROCEDURE — 71045 X-RAY EXAM CHEST 1 VIEW: CPT

## 2022-10-24 PROCEDURE — 97110 THERAPEUTIC EXERCISES: CPT

## 2022-10-24 PROCEDURE — 2580000003 HC RX 258: Performed by: NURSE PRACTITIONER

## 2022-10-24 PROCEDURE — 83735 ASSAY OF MAGNESIUM: CPT

## 2022-10-24 PROCEDURE — 94761 N-INVAS EAR/PLS OXIMETRY MLT: CPT

## 2022-10-24 PROCEDURE — 97166 OT EVAL MOD COMPLEX 45 MIN: CPT

## 2022-10-24 PROCEDURE — 83880 ASSAY OF NATRIURETIC PEPTIDE: CPT

## 2022-10-24 PROCEDURE — 97116 GAIT TRAINING THERAPY: CPT

## 2022-10-24 PROCEDURE — 97162 PT EVAL MOD COMPLEX 30 MIN: CPT

## 2022-10-24 PROCEDURE — 6360000002 HC RX W HCPCS: Performed by: HOSPITALIST

## 2022-10-24 PROCEDURE — 2700000000 HC OXYGEN THERAPY PER DAY

## 2022-10-24 PROCEDURE — 97530 THERAPEUTIC ACTIVITIES: CPT

## 2022-10-24 RX ORDER — HALOPERIDOL 5 MG/ML
5 INJECTION INTRAMUSCULAR ONCE
Status: DISCONTINUED | OUTPATIENT
Start: 2022-10-24 | End: 2022-10-25 | Stop reason: HOSPADM

## 2022-10-24 RX ORDER — MECOBALAMIN 5000 MCG
5 TABLET,DISINTEGRATING ORAL NIGHTLY
Status: DISCONTINUED | OUTPATIENT
Start: 2022-10-24 | End: 2022-10-25 | Stop reason: HOSPADM

## 2022-10-24 RX ORDER — TRAMADOL HYDROCHLORIDE 50 MG/1
50 TABLET ORAL ONCE
Status: COMPLETED | OUTPATIENT
Start: 2022-10-24 | End: 2022-10-24

## 2022-10-24 RX ORDER — POTASSIUM CHLORIDE 750 MG/1
10 TABLET, EXTENDED RELEASE ORAL DAILY
Status: DISCONTINUED | OUTPATIENT
Start: 2022-10-24 | End: 2022-10-25 | Stop reason: HOSPADM

## 2022-10-24 RX ORDER — TORSEMIDE 100 MG/1
50 TABLET ORAL DAILY
Status: DISCONTINUED | OUTPATIENT
Start: 2022-10-24 | End: 2022-10-25 | Stop reason: HOSPADM

## 2022-10-24 RX ORDER — HYDROXYZINE HYDROCHLORIDE 10 MG/1
10 TABLET, FILM COATED ORAL 3 TIMES DAILY PRN
Status: DISCONTINUED | OUTPATIENT
Start: 2022-10-24 | End: 2022-10-25 | Stop reason: HOSPADM

## 2022-10-24 RX ORDER — INSULIN LISPRO 100 [IU]/ML
10 INJECTION, SOLUTION INTRAVENOUS; SUBCUTANEOUS
Status: DISCONTINUED | OUTPATIENT
Start: 2022-10-24 | End: 2022-10-25 | Stop reason: HOSPADM

## 2022-10-24 RX ORDER — ACETAMINOPHEN 650 MG
TABLET, EXTENDED RELEASE ORAL DAILY
Status: DISCONTINUED | OUTPATIENT
Start: 2022-10-25 | End: 2022-10-25 | Stop reason: HOSPADM

## 2022-10-24 RX ADMIN — OXYCODONE AND ACETAMINOPHEN 1 TABLET: 325; 5 TABLET ORAL at 09:54

## 2022-10-24 RX ADMIN — ATORVASTATIN CALCIUM 80 MG: 80 TABLET, FILM COATED ORAL at 23:07

## 2022-10-24 RX ADMIN — Medication 10 ML: at 08:25

## 2022-10-24 RX ADMIN — INSULIN LISPRO 4 UNITS: 100 INJECTION, SOLUTION INTRAVENOUS; SUBCUTANEOUS at 13:03

## 2022-10-24 RX ADMIN — TRAMADOL HYDROCHLORIDE 50 MG: 50 TABLET, FILM COATED ORAL at 06:00

## 2022-10-24 RX ADMIN — ISOSORBIDE MONONITRATE 30 MG: 30 TABLET, EXTENDED RELEASE ORAL at 08:23

## 2022-10-24 RX ADMIN — POTASSIUM CHLORIDE 10 MEQ: 750 TABLET, EXTENDED RELEASE ORAL at 12:16

## 2022-10-24 RX ADMIN — FERROUS SULFATE TAB 325 MG (65 MG ELEMENTAL FE) 325 MG: 325 (65 FE) TAB at 23:06

## 2022-10-24 RX ADMIN — OXYCODONE AND ACETAMINOPHEN 1 TABLET: 325; 5 TABLET ORAL at 03:26

## 2022-10-24 RX ADMIN — INSULIN LISPRO 10 UNITS: 100 INJECTION, SOLUTION INTRAVENOUS; SUBCUTANEOUS at 13:03

## 2022-10-24 RX ADMIN — INSULIN LISPRO 8 UNITS: 100 INJECTION, SOLUTION INTRAVENOUS; SUBCUTANEOUS at 17:50

## 2022-10-24 RX ADMIN — TORSEMIDE 50 MG: 100 TABLET ORAL at 12:21

## 2022-10-24 RX ADMIN — Medication 5 MG: at 23:20

## 2022-10-24 RX ADMIN — INSULIN LISPRO 12 UNITS: 100 INJECTION, SOLUTION INTRAVENOUS; SUBCUTANEOUS at 08:19

## 2022-10-24 RX ADMIN — FERROUS SULFATE TAB 325 MG (65 MG ELEMENTAL FE) 325 MG: 325 (65 FE) TAB at 08:23

## 2022-10-24 RX ADMIN — CARVEDILOL 3.12 MG: 3.12 TABLET, FILM COATED ORAL at 17:49

## 2022-10-24 RX ADMIN — HYDROXYZINE HYDROCHLORIDE 10 MG: 10 TABLET ORAL at 18:21

## 2022-10-24 RX ADMIN — HYDROXYZINE HYDROCHLORIDE 10 MG: 10 TABLET ORAL at 08:41

## 2022-10-24 RX ADMIN — INSULIN GLARGINE 35 UNITS: 100 INJECTION, SOLUTION SUBCUTANEOUS at 08:19

## 2022-10-24 RX ADMIN — CARVEDILOL 3.12 MG: 3.12 TABLET, FILM COATED ORAL at 08:23

## 2022-10-24 RX ADMIN — OXYCODONE AND ACETAMINOPHEN 1 TABLET: 325; 5 TABLET ORAL at 23:06

## 2022-10-24 RX ADMIN — INSULIN LISPRO 10 UNITS: 100 INJECTION, SOLUTION INTRAVENOUS; SUBCUTANEOUS at 17:51

## 2022-10-24 RX ADMIN — TAMSULOSIN HYDROCHLORIDE 0.4 MG: 0.4 CAPSULE ORAL at 08:15

## 2022-10-24 RX ADMIN — ENOXAPARIN SODIUM 40 MG: 100 INJECTION SUBCUTANEOUS at 08:22

## 2022-10-24 ASSESSMENT — PAIN SCALES - GENERAL
PAINLEVEL_OUTOF10: 8
PAINLEVEL_OUTOF10: 7
PAINLEVEL_OUTOF10: 8
PAINLEVEL_OUTOF10: 8

## 2022-10-24 ASSESSMENT — PAIN DESCRIPTION - LOCATION
LOCATION: CHEST
LOCATION: BACK

## 2022-10-24 NOTE — CONSULTS
Leslie Lantigua Wound Ostomy Continence Nurse  Consult Note       NAME:  Tamera Brady [facility]  MEDICAL RECORD NUMBER:  [id number]  AGE: 75 y.o. GENDER: male  : 1955  TODAY'S DATE:  10/24/2022    Subjective I do wear support stockings at the nursing home. Did not bring marty with me to the hospital.   Reason for WOCN Evaluation and Assessment: Diabetic lower extremity wound      Shay Marques is a 75 y.o. male referred by:   [x] Physician  [] Nursing  [] Other:     Wound Identification:  Wound Type: Venous ulcers (small, dry and scabbed) scattered lower legs. None open. Contributing Factors: edema, venous stasis, diabetes, chronic pressure, decreased mobility, shear force, and obesity    Wound History: known to wound care. Long history of venous ulcers lower legs    This is a 77-year-old male with a past medical history of chronic kidney disease hypertension, coronary artery disease obesity, CHF presented to [facility] with abnormal lab work, in the emergency room patient was noted to have BUN of 154 creatinine of 1.9 nephrology following    Current Wound Care Treatment:  kerlex left lower leg. Patient Goal of Care:  [x] Wound Healing  [] Odor Control  [] Palliative Care  [] Pain Control   [x] Other: maintain control of swelling.         PAST MEDICAL HISTORY        Diagnosis Date    Anemia     Angina     Arthritis     CAD (coronary artery disease)     CHF (congestive heart failure) ([facility])     CKD (chronic kidney disease) stage 3, GFR 30-59 ml/min (formerly [facility])     Clostridium difficile diarrhea 3/16/12; 12    positive stool toxin    Diabetes mellitus ([address]     Diabetic neuropathy (formerly [facility])     feet and legs    Disease of blood and blood forming organ     Dizziness     When he moves his head/ loses his balance    GERD (gastroesophageal reflux disease)     gastric ulcer    Headache     Hearing loss     Hyperlipidemia     Hypertension     Kidney stone     Refusal of blood transfusions as patient is Caodaism Sleep apnea     Tinnitus     Venous ulcer (HCC)     LLE    Wound, open 2012       PAST SURGICAL HISTORY    Past Surgical History:   Procedure Laterality Date    CARDIAC SURGERY      Dec 27 2010, triple bypass    CHOLECYSTECTOMY  2011    HERNIA REPAIR  age1    KNEE ARTHROCENTESIS Right 10/6/2022    RIGHT SHOULDER INTRA ARTICULAR INJECTION SITE CONFIRMED BY FLUOROSCOPY performed by Sil Romero MD at [facility]    OTHER SURGICAL HISTORY  11 REPAIR LOWER STERNAL INCISION, REMOVAL OF ONE STERNAL WIRE,    OTHER SURGICAL HISTORY  10/10/2014    phacoemulsification of cataract with intraocular lens implant left eye    PAIN MANAGEMENT PROCEDURE N/A 2/10/2022    MIDLINE CERVICAL SIX SEVEN EPIDURAL STEROID INJECTION SITE CONFIRMED BY FLUOROSCOPY performed by Sil Romero MD at [facility]    PAIN MANAGEMENT PROCEDURE N/A 2022    MIDLINE TO RIGHT CERVICAL SIX SEVEN EPIDURAL STEROID INJECTION SITE CONFIRMED BY FLUOROSCOPY performed by Sil Romero MD at [facility]    UPPER GASTROINTESTINAL ENDOSCOPY         FAMILY HISTORY    Family History   Problem Relation Age of Onset    Heart Disease Mother     Heart Disease Father     Cancer Father     Diabetes Brother     Diabetes Brother        SOCIAL HISTORY    Social History     Tobacco Use    Smoking status: Former     Packs/day: 1.00     Years: 16.00     Pack years: 16.00     Types: Cigarettes     Quit date: 1/10/1988     Years since quittin.8    Smokeless tobacco: Never    Tobacco comments:     22 yrs ago   Vaping Use    Vaping Use: Never used   Substance Use Topics    Alcohol use: No     Alcohol/week: 0.0 standard drinks    Drug use: No       ALLERGIES    Allergies   Allergen Reactions    Amitriptyline Other (See Comments)     tremor    Celecoxib      Hyperkalemia    Cymbalta [Duloxetine Hcl] Other (See Comments)     Tremors and slurred speech    Elavil [Amitriptyline Hcl]      tremor    Gabapentin      Tremor at high dose    Nsaids      Gastric ulcer       MEDICATIONS    No current facility-administered medications on file prior to encounter. Current Outpatient Medications on File Prior to Encounter   Medication Sig Dispense Refill    acetaminophen (TYLENOL) 325 MG tablet Take 650 mg by mouth every 4 hours as needed for Pain      cyclobenzaprine (FLEXERIL) 5 MG tablet Take 5 mg by mouth every 8 hours as needed for Muscle spasms      insulin glargine (LANTUS SOLOSTAR) 100 UNIT/ML injection pen Inject 40 Units into the skin daily      insulin lispro, 1 Unit Dial, (HUMALOG KWIKPEN) 100 UNIT/ML SOPN Inject into the skin 3 times daily Per sliding scale      meclizine (ANTIVERT) 25 MG tablet Take 25 mg by mouth every 8 hours as needed for Dizziness      pregabalin (LYRICA) 25 MG capsule Take 25 mg by mouth every 12 hours as needed (pain).       polyethyl glycol-propyl glycol 0.4-0.3 % (SYSTANE ULTRA) 0.4-0.3 % ophthalmic solution Place 1 drop into both eyes 4 times daily as needed for Dry Eyes      polyethylene glycol (GLYCOLAX) 17 GM/SCOOP powder Take 17 g by mouth daily as needed (constipation)      magnesium oxide (MAG-OX) 400 (240 Mg) MG tablet Take 1 tablet by mouth 2 times daily 30 tablet 0    spironolactone (ALDACTONE) 25 MG tablet Take 1 tablet by mouth daily 30 tablet 3    metOLazone (ZAROXOLYN) 5 MG tablet 1 tab three times a week (Monday, Wednesday, and fridays) 24 tablet 0    potassium chloride (KLOR-CON M) 20 MEQ extended release tablet Take 20 mEq by mouth daily      torsemide (DEMADEX) 100 MG tablet Take 1 tablet by mouth in the morning and at bedtime 30 tablet 3    Sodium Phosphates (FLEET) 7-19 GM/118ML Place 1 enema rectally daily as needed      glucagon 1 MG injection Inject 1 kit into the muscle as needed (for hypoglycemia) Emergencies only      loperamide (IMODIUM) 2 MG capsule Take 2 mg by mouth as needed for Diarrhea      magnesium hydroxide (MILK OF MAGNESIA) 400 MG/5ML suspension Take 30 mLs by mouth daily as needed for Constipation oxyCODONE-acetaminophen (PERCOCET) 5-325 MG per tablet Take 1 tablet by mouth every 6 hours as needed for Pain. ondansetron (ZOFRAN-ODT) 4 MG disintegrating tablet Take 4 mg by mouth every 6 hours as needed for Nausea      RHOPRESSA 0.02 % SOLN Place 1 drop into the left eye nightly      dorzolamide (TRUSOPT) 2 % ophthalmic solution Place 1 drop into the left eye 2 times daily      brimonidine-timolol (COMBIGAN) 0.2-0.5 % ophthalmic solution Place 1 drop into the left eye 2 times daily      isosorbide mononitrate (IMDUR) 30 MG extended release tablet Take 1 tablet by mouth daily 30 tablet 1    carvedilol (COREG) 3.125 MG tablet Take 1 tablet by mouth 2 times daily (with meals) 60 tablet 3    linaclotide (LINZESS) 290 MCG CAPS capsule Take 1 capsule by mouth every morning (before breakfast) 30 capsule 1    tamsulosin (FLOMAX) 0.4 MG capsule Take 0.8 mg by mouth Daily      atorvastatin (LIPITOR) 80 MG tablet Take 1 tablet by mouth nightly 30 tablet 3    allopurinol (ZYLOPRIM) 300 MG tablet Take 1 tablet by mouth daily 30 tablet 0    naloxone 4 MG/0.1ML LIQD nasal spray 1 spray by Nasal route as needed for Opioid Reversal 1 each 0    senna (SENOKOT) 8.6 MG TABS tablet Take 1 tablet by mouth 2 times daily 120 tablet 0    docusate sodium (COLACE, DULCOLAX) 100 MG CAPS Take 100 mg by mouth 2 times daily      silver sulfADIAZINE (SILVADENE) 1 % cream Apply to BL lower leg ulcers daily 20 g 0    [DISCONTINUED] nitroGLYCERIN (NITROSTAT) 0.4 MG SL tablet Place 1 tablet under the tongue every 5 minutes as needed (Patient not taking: No sig reported) 25 tablet 1    fluticasone (FLONASE) 50 MCG/ACT nasal spray 1 spray by Each Nostril route daily as needed for Allergies      ferrous sulfate 325 (65 FE) MG tablet Take 325 mg by mouth 2 times daily       omeprazole (PRILOSEC) 20 MG delayed release capsule Take 20 mg by mouth 2 times daily. Objective  Sitting up in chair. Legs dependent.     BP (!) 100/51   Pulse 82 Temp 97.9 °F (36.6 °C) (Oral)   Resp 16   Ht 5' 9\" (1.753 m)   Wt 223 lb 4.8 oz (101.3 kg)   SpO2 96%   BMI 32.98 kg/m²     LABS:  WBC:    Lab Results   Component Value Date/Time    WBC 8.1 10/22/2022 07:01 AM     H/H:    Lab Results   Component Value Date/Time    HGB 12.0 10/22/2022 07:01 AM    HCT 36.4 10/22/2022 07:01 AM     PTT:    Lab Results   Component Value Date/Time    APTT 39.2 03/31/2022 08:03 PM   [APTT}  PT/INR:    Lab Results   Component Value Date/Time    PROTIME 17.0 08/29/2022 09:58 AM    INR 1.40 08/29/2022 09:58 AM     HgBA1c:    Lab Results   Component Value Date/Time    LABA1C 8.9 08/25/2022 06:38 AM       Assessment  Edema bilateral lower legs right > L. Few dry small scabs anterior lower legs. NO open areas. Hemasiderin staining lower legs as well with purplish tint.      John Risk Score: John Scale Score: 18    Patient Active Problem List   Diagnosis    DM (diabetes mellitus) ([facility]    Coronary artery disease involving native coronary artery of native heart without angina pectoris    Anemia of chronic disease    Essential hypertension    Hyperkalemia    CKD (chronic kidney disease) stage 3, GFR 30-59 ml/min ([facility])    Chronic diastolic heart failure ([facility]    Pulmonary hypertension due to left ventricular diastolic dysfunction (HCC)    Obesity    S/P CABG x 3    DM2 (diabetes mellitus, type 2) ([facility]    Morbid obesity with BMI of 50.0-59.9, adult (HCC)    HLD (hyperlipidemia)    Cardiomyopathy (HCC)    Productive cough    Chronic pain in right shoulder    Severe obstructive sleep apnea    Obesity, Class III, BMI 40-49.9 (morbid obesity) ([facility])    Acute diastolic congestive heart failure (HCC)    Degeneration of lumbar or lumbosacral intervertebral disc    Displacement of lumbar intervertebral disc without myelopathy    Lumbosacral spondylosis without myelopathy    Lumbar facet arthropathy    Disc displacement, lumbar    Spinal stenosis of lumbar region    Mixed conductive and sensorineural hearing loss of left ear with restricted hearing of right ear    Tympanic membrane perforation, left    Chest pain    Cor pulmonale, chronic (HCC)    Pulmonary hypertension due to alveolar hypoventilation disorder (HCC)    Nonrheumatic aortic valve stenosis    Chronic neck pain    Dyspnea    Acute diastolic CHF (congestive heart failure) (HCC)    Cervical stenosis of spinal canal    Degeneration of cervical intervertebral disc    Cervical radiculitis    Acute on chronic diastolic CHF (congestive heart failure) (HCC)    Acute respiratory failure with hypoxia ([facility])    CKD (chronic kidney disease)    PAF (paroxysmal atrial fibrillation) ([facility])    Acute diastolic HF (heart failure) ([facility])    Subdural hematoma    SDH (subdural hematoma)    Chronic respiratory failure with hypoxia ([facility])    Fluid overload    MARGARITO (acute kidney injury) ([facility]    Dehydration       Measurements:      No open sores at this time      Response to treatment:  Well tolerated by patient. Pain Assessment:  Severity:  0 / 10  Quality of pain: N/A  Wound Pain Timing/Severity: none  Premedicated: No    Plan   Plan of Care:      Dr Kayleigh Edwards notified of legs with out any open areas today. Recommend:  Clean lower legs with foamy cleanser and bath wipes or soap and water. Pat dry. Paint scabbed areas on right ald left lower anterior legs with betadine. Apply moisturizing cream to lower legs and feet. Apply ace wraps from toes to knees in the am and remove at night. Encourage patient to elevated legs if sitting for extended period of times. Wound care to sign off. Re-consult for deterioration. Specialty Bed Required : Yes   [] Low Air Loss   [x] Pressure Redistribution  [] Fluid Immersion  [] Bariatric  [] Total Pressure Relief  [] Other:     Current Diet: ADULT DIET; Regular; 3 carb choices (45 gm/meal);  Low Sodium (2 gm); 1800 ml  Dietician consult:  Yes    Discharge Plan:  Placement for patient upon discharge: intermediate care facility    Patient appropriate for Outpatient [address]: No    Referrals:  []  / discharge planner back to SNF  [] [facility]  [] Supplies  [] Other    Patient/Caregiver Teaching: encourage to continue elevating legs while sitting up for extended period of time.      Level of patient/caregiver understanding able to:   [] Indicates understanding       [] Needs reinforcement  [] Unsuccessful      [x] Verbal Understanding  [] Demonstrated understanding       [] No evidence of learning  [] Refused teaching         [] N/A       Electronically signed by Kateryna Siegel RN, MSN, Corey Frost on 10/24/2022 at 3:52 PM

## 2022-10-24 NOTE — PROGRESS NOTES
Occupational Therapy  Facility/Department: [place] [address] - MED SURG  Occupational Therapy Initial Assessment    Name: Lesa Kawasaki  : 1955  MRN: [id number]  Date of Service: 10/24/2022    Discharge Recommendations:  [address] with OT  OT Equipment Recommendations  Equipment Needed: No       Patient Diagnosis(es): The encounter diagnosis was Acute renal failure superimposed on chronic kidney disease, unspecified CKD stage, unspecified acute renal failure type ([address] Past Medical History:  has a past medical history of Anemia, Angina, Arthritis, CAD (coronary artery disease), CHF (congestive heart failure) ([address] CKD (chronic kidney disease) stage 3, GFR 30-59 ml/min ([facility]), Clostridium difficile diarrhea, Diabetes mellitus ([address] Diabetic neuropathy ([address] Disease of blood and blood forming organ, Dizziness, GERD (gastroesophageal reflux disease), Headache, Hearing loss, Hyperlipidemia, Hypertension, Kidney stone, Refusal of blood transfusions as patient is Alevism, Sleep apnea, Tinnitus, Venous ulcer ([address] and Wound, open. Past Surgical History:  has a past surgical history that includes hernia repair (age2); Cholecystectomy (2011); other surgical history (- REPAIR LOWER STERNAL INCISION, REMOVAL OF ONE STERNAL WIRE,); Upper gastrointestinal endoscopy; Cardiac surgery; other surgical history (10/10/2014); Pain management procedure (N/A, 2/10/2022); Pain management procedure (N/A, 2022); and Knee Arthrocentesis (Right, 10/6/2022). Treatment Diagnosis: impaired mobility      Assessment   Performance deficits / Impairments: Decreased functional mobility ; Decreased strength;Decreased endurance;Decreased vision/visual deficit; Decreased balance;Decreased ADL status; Decreased safe awareness  Assessment:Pt is a 75 y.o. yo male admitted to [facility] with a dx of dehydration.   PTA, pt was living in a [place] [address] and reports requiring setup for showers and IND with dressing, toileting, and functional mobility with 4WW. After evaluation and treatment, pt found to be presenting with the above mentioned deficits. Pt is currently functioning below baseline and is SBA for standing level UB ADLs, CGA for LB ADLs, CGA for functional transfers, and CGA for functional mobility with SW. Pt most limited by decreased activity tolerance reporting mod fatigue after standing for <3 mins. Pt also demo'd poor safety awareness for O2 line management requiring frequent cues. Pt would benefit from continued skilled occupational therapy to address these deficits, increasing safety and independence with ADL and functional mobility. Recommend pt to return home with LTC with OT. Treatment Diagnosis: impaired mobility  Prognosis: Fair  Decision Making: Medium Complexity  REQUIRES OT FOLLOW-UP: Yes  Activity Tolerance  Activity Tolerance: Patient limited by fatigue  Activity Tolerance Comments: Pt reporting mod fatigue, declining functional mobility trial however requesting toileting during session.  SOB noted with SPO2 sats remaining >90%        Plan   Occupational Therapy Plan  Times Per Week: 3-5x  Current Treatment Recommendations: Strengthening, Balance training, Gait training, Functional mobility training, Endurance training, Safety education & training, Patient/Caregiver education & training, Equipment evaluation, education, & procurement, Self-Care / ADL     Restrictions  Restrictions/Precautions  Restrictions/Precautions: General Precautions  Position Activity Restriction  Other position/activity restrictions: tele    Subjective   General  Patient assessed for rehabilitation services?: Yes     Social/Functional History  Social/Functional History  Lives With: Alone  Type of Home: Assisted living [facility]  Home Layout: One level  Home Access: Level entry  Bathroom Shower/Tub: Walk-in shower  Bathroom Toilet: Handicap height  Bathroom Equipment: Grab bars in shower, Shower chair, Hand-held shower  Bathroom Accessibility: Walker accessible  Home Equipment: Isabela Vianney, 4 wheeled, Oxygen (3-4 L O2 at baseline)  Has the patient had two or more falls in the past year or any fall with injury in the past year?: Yes (1 fall in the restroom d/t wet floor)  ADL Assistance: Needs assistance  Bath: Minimal assistance (setup-min A 2/2 to blindness)  Dressing: Independent  Grooming: Independent  Feeding: Independent  Toileting: Independent  Meal Prep Responsibility: No (meals provided)  Laundry Responsibility: No  Cleaning Responsibility:  (reports cleaning services comes \"A few days a week\")  Shopping Responsibility: No  Health Care Management: No (RN provides scheduled pain meds at facility)  Ambulation Assistance: Independent  Transfer Assistance: Independent  Active : No  Occupation: Retired  Type of Occupation: Restaurant owner  [address] [place] [address] Avenue: Watch tv or have visitor stop by       Objective   Heart Rate: 82  Heart Rate Source: Monitor  BP: (!) 100/51  BP Location: Left upper arm  Patient Position: Sitting;Up in chair  MAP (Calculated): 67.33  Resp: 16  SpO2: 96 %  O2 Device: None (Room air)          Observation/Palpation  Posture: Good  Edema: LUE/LLE edema  Safety Devices  Type of Devices: Chair alarm in place;Call light within reach; All fall risk precautions in place; Left in chair;Nurse notified;Gait belt;Patient at risk for falls  Bed Mobility Training  Bed Mobility Training: No (up in chair upon arrival/exit)  Balance  Sitting: Intact  Standing: With support (SBA with RW)  Transfer Training  Transfer Training: Yes  Overall Level of Assistance: Supervision  Sit to Stand: Supervision  Stand to Sit: Supervision  Toilet Transfer: Contact-guard assistance (chair<>toilet.  Cues required for safety d/t pt attempting to ambulate with O2 tubing around ankle)       Sensation: Impaired (Pt endorsing N/T in B hands and feet)  ADL  Feeding: Independent  Grooming: Stand by assistance  Grooming Skilled Clinical Factors: in stance at sink with SW  LE Dressing: Contact guard assistance  LE Dressing Skilled Clinical Factors: pt completed dynamic reach in stance to reach pants on floor with CGA provided for balance     Activity Tolerance  Activity Tolerance: Patient tolerated evaluation without incident;Patient limited by fatigue;Patient limited by endurance        Vision  Vision: Impaired  Vision Exceptions: Legally blind (Legally blind B eyes, no vision R eye 50% L eye)  Hearing  Hearing: Exceptions to [facility] (Pt reports \"I lost my ear drum last year\")  Hearing Exceptions: Hard of hearing/hearing concerns; No hearing aid  Cognition  Overall Cognitive Status: Exceptions  Arousal/Alertness: Appropriate responses to stimuli  Following Commands: Follows all commands without difficulty  Attention Span: Attends with cues to redirect  Memory: Appears intact  Safety Judgement: Decreased awareness of need for assistance;Decreased awareness of need for safety  Problem Solving: Assistance required to correct errors made  Insights: Not aware of deficits  Cognition Comment: Pt attempting to ambulate with O2 tubing around R ankle. RN aware of safety concerns and reporting pt refusing chair alarm. Pt educated on O2 line management during mobility and continues to adamently refuse chair alarm.  RN aware  Orientation  Overall Orientation Status: Within Functional Limits  Orientation Level: Oriented X4                  Education Given To: Patient  Education Provided: Role of Therapy;Plan of Care;Transfer Training;IADL Safety  Education Method: Verbal  Barriers to Learning: None  Education Outcome: Verbalized understanding;Continued education needed         AM-PAC Score  AM-PAC Inpatient Daily Activity Raw Score: 19 (10/24/22 1243)  AM-PAC Inpatient ADL T-Scale Score : 40.22 (10/24/22 1243)  ADL Inpatient CMS 0-100% Score: 42.8 (10/24/22 1243)  ADL Inpatient CMS G-Code Modifier : CK (10/24/22 1243)      Goals  Short Term Goals  Time Frame for Short Term Goals: 1 week (10/31) unless noted  Short Term Goal 1: Pt will complete toileting with mod I  Short Term Goal 2: Pt will complete x10 reps of BUE exercises for edema management  Short Term Goal 3: Pt will verbalize 3 energy conservation techniques to implement during ADLs  Short Term Goal 4: Pt will tolerate >5 min stand during standing level ADLs with mod I  Patient Goals   Patient goals : \"I want to get to the bathroom by myself\"       Therapy Time   Individual Concurrent Group Co-treatment   Time In 0952         Time Out 1025         Minutes 33         Timed Code Treatment Minutes: 23 Minutes (10 min eval)     If pt is unable to be seen after this session, please let this note serve as discharge summary. Please see case management note for discharge disposition. Thank you.     Luisa Mejia OT

## 2022-10-24 NOTE — PLAN OF CARE
Problem: Discharge Planning  Goal: Discharge to home or other facility with appropriate resources  Outcome: Progressing     Problem: Pain  Goal: Verbalizes/displays adequate comfort level or baseline comfort level  Outcome: Progressing  Flowsheets (Taken 10/23/2022 1145 by America Waldrop RN)  Verbalizes/displays adequate comfort level or baseline comfort level: Assess pain using appropriate pain scale     Problem: Skin/Tissue Integrity  Goal: Absence of new skin breakdown  Description: 1. Monitor for areas of redness and/or skin breakdown  2. Assess vascular access sites hourly  3. Every 4-6 hours minimum:  Change oxygen saturation probe site  4. Every 4-6 hours:  If on nasal continuous positive airway pressure, respiratory therapy assess nares and determine need for appliance change or resting period.   Outcome: Progressing     Problem: ABCDS Injury Assessment  Goal: Absence of physical injury  Outcome: Progressing     Problem: Safety - Adult  Goal: Free from fall injury  Outcome: Progressing     Problem: Chronic Conditions and Co-morbidities  Goal: Patient's chronic conditions and co-morbidity symptoms are monitored and maintained or improved  Outcome: Progressing     Problem: Respiratory - Adult  Goal: Achieves optimal ventilation and oxygenation  Outcome: Progressing     Problem: Cardiovascular - Adult  Goal: Maintains optimal cardiac output and hemodynamic stability  Outcome: Progressing  Goal: Absence of cardiac dysrhythmias or at baseline  Outcome: Progressing     Problem: Skin/Tissue Integrity - Adult  Goal: Skin integrity remains intact  Outcome: Progressing     Problem: Musculoskeletal - Adult  Goal: Return mobility to safest level of function  Outcome: Progressing     Problem: Metabolic/Fluid and Electrolytes - Adult  Goal: Electrolytes maintained within normal limits  Outcome: Progressing     Problem: Hematologic - Adult  Goal: Maintains hematologic stability  Outcome: Progressing

## 2022-10-24 NOTE — PROGRESS NOTES
Physical Therapy  Facility/Department: [place] [id number] - MED SURG  Physical Therapy Initial Assessment    Name: Trevin Hughes  : 1955  MRN: [id number]  Date of Service: 10/24/2022    Discharge Recommendations:  Home with Home health PT (return to AL)   PT Equipment Recommendations  Equipment Needed: No      Patient Diagnosis(es): The encounter diagnosis was Acute renal failure superimposed on chronic kidney disease, unspecified CKD stage, unspecified acute renal failure type ([address] Past Medical History:  has a past medical history of Anemia, Angina, Arthritis, CAD (coronary artery disease), CHF (congestive heart failure) ([address] CKD (chronic kidney disease) stage 3, GFR 30-59 ml/min ([facility], [facility] within [facility]), Clostridium difficile diarrhea, Diabetes mellitus ([address] Diabetic neuropathy ([address] Disease of blood and blood forming organ, Dizziness, GERD (gastroesophageal reflux disease), Headache, Hearing loss, Hyperlipidemia, Hypertension, Kidney stone, Refusal of blood transfusions as patient is Congregational, Sleep apnea, Tinnitus, Venous ulcer ([address] and Wound, open. Past Surgical History:  has a past surgical history that includes hernia repair (age2); Cholecystectomy (2011); other surgical history (- REPAIR LOWER STERNAL INCISION, REMOVAL OF ONE STERNAL WIRE,); Upper gastrointestinal endoscopy; Cardiac surgery; other surgical history (10/10/2014); Pain management procedure (N/A, 2/10/2022); Pain management procedure (N/A, 2022); and Knee Arthrocentesis (Right, 10/6/2022). Assessment   Body Structures, Functions, Activity Limitations Requiring Skilled Therapeutic Intervention: Decreased functional mobility ; Decreased endurance  Assessment: Pt is a 76 y/o male who presents with acute renal failure. Pt was living in assisted living prior to admit and was independent with ambulation with use of rollator. Pt currently requires SBA/supervision for transfers and ambulation.  Pt would benefit from continued skilled PT to address these limtiations. Recommend return to AL with therapy. Treatment Diagnosis: impaired functional mobility  Therapy Prognosis: Good  Decision Making: Medium Complexity  Requires PT Follow-Up: Yes  Activity Tolerance  Activity Tolerance: Patient tolerated evaluation without incident;Patient limited by fatigue;Patient limited by endurance     Plan   Physcial Therapy Plan  General Plan: 2-3 times per week  Current Treatment Recommendations: Strengthening, Functional mobility training, Balance training, Gait training, Transfer training, Endurance training, Home exercise program, Therapeutic activities  Safety Devices  Type of Devices: Chair alarm in place, Call light within reach, All fall risk precautions in place, Left in chair, Nurse notified, Gait belt, Patient at risk for falls     Restrictions  Restrictions/Precautions  Restrictions/Precautions: General Precautions  Position Activity Restriction  Other position/activity restrictions: tele     Subjective   General  Chart Reviewed: Yes  Patient assessed for rehabilitation services?: Yes  Family / Caregiver Present: No  Referring Practitioner: Mai Carvajal MD  Referral Date : 10/23/22  Diagnosis: dehydration  Follows Commands: Within Functional Limits  Subjective  Subjective: Pt agreeable to evaluation. Denies pain.          Social/Functional History  Social/Functional History  Lives With: Alone  Type of Home: Assisted living [facility]  Home Layout: One level  Home Access: Level entry  Bathroom Shower/Tub: Walk-in shower  Bathroom Toilet: Handicap height  Bathroom Equipment: Grab bars in shower, Shower chair, Hand-held shower  Bathroom Accessibility: Walker accessible  Home Equipment: Andrade Siaac, 4 wheeled, Oxygen (3-4 L O2 at baseline)  Has the patient had two or more falls in the past year or any fall with injury in the past year?: Yes (1 fall in the restroom d/t wet floor)  ADL Assistance: Needs assistance  Bath: Minimal assistance (setup-min A 2/2 to blindness)  Dressing: Independent  Grooming: Independent  Feeding: Independent  Toileting: Independent  Meal Prep Responsibility: No (meals provided)  Laundry Responsibility: No  Cleaning Responsibility:  (reports cleaning services comes \"A few days a week\")  Shopping Responsibility: No  Health Care Management: No (RN provides scheduled pain meds at facility)  Ambulation Assistance: Independent  Transfer Assistance: Independent  Active : No  Occupation: Retired  Type of Occupation: Restaurant owner  [facility]: Watch tv or have visitor stop by  Vision/Hearing       Cognition         Objective   Heart Rate: 68  Heart Rate Source: Monitor  BP: 136/78  BP Location: Left Arm  BP Method: Automatic  Patient Position: Sitting  MAP (Calculated): 97.33  Resp: 16  SpO2: 97 %  O2 Device: Nasal cannula        Gross Assessment  Strength: Generally decreased, functional  Sensation: Impaired (pt reports numbness in legs but unable to elaborate)                 Bed Mobility Training  Bed Mobility Training: No (up in chair upon arrival/exit)  Balance  Sitting: Intact  Standing: With support (SBA with RW)  Transfer Training  Transfer Training: Yes  Overall Level of Assistance: Supervision  Sit to Stand: Supervision  Stand to Sit: Supervision  Gait Training  Gait Training: Yes  Gait  Overall Level of Assistance: Adaptive equipment;Stand-by assistance; Additional time  Interventions: Verbal cues  Base of Support: Widened  Speed/Chrissy: Slow;Shuffled;Pace decreased (< 100 feet/min)  Step Length: Left shortened;Right shortened  Gait Abnormalities: Decreased step clearance; Step to gait  Distance (ft): 20 Feet (distance limited by fatigue)  Assistive Device: Walker, rolling;Gait belt                 Exercise Treatment: Seated ther ex x10 reps: ankle pumps, LAQ, hip flexion, hip abd/add, glute sets; finger flex/ext, wrist flex/ext, elbow flex/ext, shoulder shrugs; rest breaks required between exercises due to fatigue AM-PAC Score  AM-PAC Inpatient Mobility Raw Score : 21 (10/24/22 1114)  AM-PAC Inpatient T-Scale Score : 50.25 (10/24/22 1114)  Mobility Inpatient CMS 0-100% Score: 28.97 (10/24/22 1114)  Mobility Inpatient CMS G-Code Modifier : CJ (10/24/22 1114)            Goals  Short Term Goals  Time Frame for Short Term Goals: 5 days (10/29/22)  Short Term Goal 1: Pt will perform bed mobility mod I  Short Term Goal 2: Pt will perform transfers mod I  Short Term Goal 3: Pt will ambulate 150' with RW and supervision  Short Term Goal 4: By 10/27/22, pt will perform 15 reps of LE ther ex for improved strength and activity tolerance  Patient Goals   Patient Goals : \"to be able to go back home\"       Education  Patient Education  Education Given To: Patient  Education Provided: Role of Therapy;Home Exercise Program  Education Provided Comments: Safety with mobility; HF education reinforced this date to include ther ex - pt declines need for further education as this was covered in recent admission  Education Method: Demonstration;Verbal  Barriers to Learning: None  Education Outcome: Verbalized understanding;Demonstrated understanding      Therapy Time   Individual Concurrent Group Co-treatment   Time In 1029         Time Out 1104         Minutes 35         Timed Code Treatment Minutes: [facility],   [facility]

## 2022-10-24 NOTE — PROGRESS NOTES
Hospitalist Progress Note      PCP: Tatyana Patino MD    Date of Admission: 10/21/2022    Chief Complaint: Abnormal lab work    Hospital Course: This is a 57-year-old male with a past medical history of chronic kidney disease hypertension, coronary artery disease obesity, CHF presented to [facility] with abnormal lab work, in the emergency room patient was noted to have BUN of 154 creatinine of 1.9 nephrology consulted and following    Subjective: still having SOB. Restarted diuretic today.       Medications:  Reviewed    Infusion Medications    sodium chloride      dextrose       Scheduled Medications    haloperidol lactate  5 mg IntraMUSCular Once    insulin lispro  10 Units SubCUTAneous TID WC    torsemide  50 mg Oral Daily    potassium chloride  10 mEq Oral Daily    enoxaparin  40 mg SubCUTAneous Daily    atorvastatin  80 mg Oral Nightly    carvedilol  3.125 mg Oral BID WC    ferrous sulfate  325 mg Oral BID    insulin glargine  35 Units SubCUTAneous Daily    isosorbide mononitrate  30 mg Oral Daily    linaclotide  290 mcg Oral QAM AC    tamsulosin  0.4 mg Oral Daily    sodium chloride flush  5-40 mL IntraVENous 2 times per day    insulin lispro  0-16 Units SubCUTAneous TID WC    insulin lispro  0-4 Units SubCUTAneous Nightly     PRN Meds: hydrOXYzine HCl, magnesium hydroxide, sodium chloride, acetaminophen, oxyCODONE-acetaminophen, sodium chloride flush, sodium chloride, ondansetron **OR** ondansetron, polyethylene glycol, acetaminophen **OR** acetaminophen, glucose, dextrose bolus **OR** dextrose bolus, glucagon (rDNA), dextrose      Intake/Output Summary (Last 24 hours) at 10/24/2022 1424  Last data filed at 10/24/2022 0759  Gross per 24 hour   Intake 75 ml   Output 3100 ml   Net -3025 ml       Physical Exam Performed:    BP (!) 100/51   Pulse 82   Temp 97.9 °F (36.6 °C) (Oral)   Resp 16   Ht 5' 9\" (1.753 m)   Wt 223 lb 4.8 oz (101.3 kg)   SpO2 96%   BMI 32.98 kg/m²     General appearance: No apparent distress, appears stated age and cooperative. HEENT: Pupils equal, round, and reactive to light. Conjunctivae/corneas clear. Neck: Supple, with full range of motion. No jugular venous distention. Trachea midline. Respiratory:  Normal respiratory effort. Clear to auscultation, bilaterally without Rales/Wheezes/Rhonchi. Cardiovascular: Regular rate and rhythm with normal S1/S2 without murmurs, rubs or gallops. Abdomen: Soft, non-tender, non-distended with normal bowel sounds. Musculoskeletal: No clubbing, cyanosis or edema bilaterally. Full range of motion without deformity. Ace wrap bilateral lower extremity  Skin: Skin color, texture, turgor normal.  No rashes or lesions. Neurologic:  Neurovascularly intact without any focal sensory/motor deficits. Cranial nerves: II-XII intact, grossly non-focal.  Psychiatric: Alert and oriented, thought content appropriate, normal insight  Capillary Refill: Brisk, 3 seconds, normal   Peripheral Pulses: +2 palpable, equal bilaterally       Labs:   Recent Labs     10/22/22  0701   WBC 8.1   HGB 12.0*   HCT 36.4*        Recent Labs     10/23/22  1924 10/24/22  0025 10/24/22  1151   * 129* 130*   K 3.7 3.9 3.6   CL 91* 91* 92*   CO2 30 31 30   BUN 89* 82* 76*   CREATININE 1.5* 1.5* 1.3   CALCIUM 9.1 9.7 9.9     No results for input(s): AST, ALT, BILIDIR, BILITOT, ALKPHOS in the last 72 hours. No results for input(s): INR in the last 72 hours. No results for input(s): Ashly Nasuti in the last 72 hours.     Urinalysis:      Lab Results   Component Value Date/Time    NITRU Negative 10/21/2022 01:20 PM    WBCUA 0-2 11/05/2013 10:31 AM    BACTERIA Rare 11/05/2013 10:31 AM    RBCUA 0-2 11/05/2013 10:31 AM    BLOODU Negative 10/21/2022 01:20 PM    SPECGRAV 1.010 10/21/2022 01:20 PM    GLUCOSEU 250 10/21/2022 01:20 PM    GLUCOSEU NEGATIVE 02/29/2012 03:17 PM       Radiology:  XR CHEST PORTABLE   Final Result   Cardiomegaly with pulmonary vascular congestion. No evidence of overt edema                 Assessment/Plan:    Active Hospital Problems    Diagnosis     Dehydration [E86.0]      Priority: Medium    Obesity [E66.9]     CKD (chronic kidney disease) stage 3, GFR 30-59 ml/min ([facility]) [N18.30]     Essential hypertension [I10]     DM (diabetes mellitus) ([facility] [E11.9]     Coronary artery disease involving native coronary artery of native heart without angina pectoris [I25.10]      MARGARITO on CKD  - nephrology consulted  - 2/2 overdiuresis  - s/p IVF  - restarting home diuretic  - continue to monitor    Chronic diastolic heart failure  - appears euvolemic  - restarting home torsemide. Holding aldactone    HTN  - well controlled  - continue coreg. Holding aldactone    HLD  - continue statin    DMII  - poorly controlled  - continue basal bolus insulin    Anemia  - chronic, stable  - continue to monitor    DVT Prophylaxis: Heparin subcu  Diet: ADULT DIET; Regular; 3 carb choices (45 gm/meal);  Low Sodium (2 gm); 1800 ml  Code Status: Limited  PT/OT Eval Status: Consulted    Dispo - LTC likely tomorrow    Appropriate for A1 Discharge Unit: Yes      Moriah Yo MD

## 2022-10-24 NOTE — PROGRESS NOTES
Pt educated on multiple occasions about the importance of a bed alarm and maintaining safety during hospital stay. Pt remains adamant that he is capable of making it to the restroom without assistance.

## 2022-10-24 NOTE — PROGRESS NOTES
Nephrology Progress Note   [facility]. [employer]      This patient is a 75year old male whom we are following for MARGARITO on CKD. Subjective: The patient was seen and examined. Eating breakfast. Complains of mild SOB, on RA. Weight up to 223lbs from 209lbs. Family History: No family at bedside  ROS: No nausea or vomiting      Vitals:  /78   Pulse 73   Temp 98.6 °F (37 °C) (Oral)   Resp 16   Ht 5' 9\" (1.753 m)   Wt 223 lb 4.8 oz (101.3 kg)   SpO2 97%   BMI 32.98 kg/m²   I/O last 3 completed shifts: In: 390 [P.O.:390]  Out: 6250 [Urine:6250]  I/O this shift:  In: -   Out: 500 [Urine:500]      Physical Exam  Vitals reviewed. Constitutional:       General: He is not in acute distress. HENT:      Head: Normocephalic and atraumatic. Eyes:      General: No scleral icterus. Conjunctiva/sclera: Conjunctivae normal.   Cardiovascular:      Rate and Rhythm: Normal rate. Heart sounds: No friction rub. Pulmonary:      Effort: Pulmonary effort is normal. No respiratory distress. Breath sounds: Rales present. Abdominal:      General: Bowel sounds are normal. There is no distension. Tenderness: There is no abdominal tenderness. Musculoskeletal:      Right lower leg: Edema present. Left lower leg: Edema present. Neurological:      Mental Status: He is alert.          Medications:   haloperidol lactate  5 mg IntraMUSCular Once    insulin lispro  10 Units SubCUTAneous TID WC    enoxaparin  40 mg SubCUTAneous Daily    atorvastatin  80 mg Oral Nightly    carvedilol  3.125 mg Oral BID WC    ferrous sulfate  325 mg Oral BID    insulin glargine  35 Units SubCUTAneous Daily    isosorbide mononitrate  30 mg Oral Daily    linaclotide  290 mcg Oral QAM AC    tamsulosin  0.4 mg Oral Daily    sodium chloride flush  5-40 mL IntraVENous 2 times per day    insulin lispro  0-16 Units SubCUTAneous TID WC    insulin lispro  0-4 Units SubCUTAneous Nightly         Labs:  Recent Labs 10/21/22  1150 10/22/22  0701   WBC 8.1 8.1   HGB 11.9* 12.0*   HCT 35.5* 36.4*   MCV 81.5 82.5    192     Recent Labs     10/22/22  0701 10/22/22  1330 10/23/22  0734 10/23/22  1329 10/23/22  1924 10/24/22  0025   *   < > 132* 129* 129* 129*   K 3.7   < > 3.7 3.6 3.7 3.9   CL 84*   < > 89* 88* 91* 91*   CO2 32   < > 34* 31 30 31   GLUCOSE 346*   < > 313* 307* 450* 372*   MG 2.80*  --  2.60*  --   --   --    *   < > 109* 106* 89* 82*   CREATININE 1.6*   < > 1.5* 1.5* 1.5* 1.5*   LABGLOM 46*   < > 50* 50* 50* 50*    < > = values in this interval not displayed. Assessment/Plan:    MARGARITO on CKD stage 3b. - Seen by Dr. Meredith Guzman in the office in 03/2021. CKD presumed secondary to DM nephropathy and CRS. - Prior baseline serum creatinine was 1.7mg/dL but recently had been trending higher. Azotemic with need for chronic diuretics. - MARGARITO thought to be from overdiuresis, improved with holding diuretics and IVF hydration.  - Kidney function has been stable the past couple of days.  - Restart diuretic as below. - Avoid hypotension and nephrotoxic medications. Hyponatremia.  - Initially from hypovolemia with impaired free water excretion in the setting of renal dysfunction. Now seems hypervolemic + component of pseudohyponatremia from hyperglycemia.  - Monitor after starting diuretic. Hypertension.  - Episodes of low BP.  - On Carvedilol and Imdur. CHF.  - Restart Torsemide 50mg daily.  - Sodium and fluid restriction.  - Daily weights. Please do not hesitate to contact me at [phone] if with questions. Thank you! Stoney Betancourt MD  [facility] ORTHOPEDIC [facility] [employer]  10/24/2022

## 2022-10-25 VITALS
HEIGHT: 69 IN | RESPIRATION RATE: 16 BRPM | OXYGEN SATURATION: 89 % | HEART RATE: 96 BPM | SYSTOLIC BLOOD PRESSURE: 100 MMHG | DIASTOLIC BLOOD PRESSURE: 55 MMHG | TEMPERATURE: 98.1 F | WEIGHT: 221.9 LBS | BODY MASS INDEX: 32.87 KG/M2

## 2022-10-25 LAB
ANION GAP SERPL CALCULATED.3IONS-SCNC: 12 MMOL/L (ref 3–16)
BUN BLDV-MCNC: 72 MG/DL (ref 7–20)
CALCIUM SERPL-MCNC: 10 MG/DL (ref 8.3–10.6)
CHLORIDE BLD-SCNC: 90 MMOL/L (ref 99–110)
CO2: 30 MMOL/L (ref 21–32)
CREAT SERPL-MCNC: 1.4 MG/DL (ref 0.8–1.3)
GFR SERPL CREATININE-BSD FRML MDRD: 55 ML/MIN/{1.73_M2}
GLUCOSE BLD-MCNC: 248 MG/DL (ref 70–99)
GLUCOSE BLD-MCNC: 249 MG/DL (ref 70–99)
GLUCOSE BLD-MCNC: 283 MG/DL (ref 70–99)
MAGNESIUM: 2 MG/DL (ref 1.8–2.4)
PERFORMED ON: ABNORMAL
PERFORMED ON: ABNORMAL
POTASSIUM SERPL-SCNC: 3.9 MMOL/L (ref 3.5–5.1)
SODIUM BLD-SCNC: 132 MMOL/L (ref 136–145)

## 2022-10-25 PROCEDURE — 2580000003 HC RX 258: Performed by: NURSE PRACTITIONER

## 2022-10-25 PROCEDURE — 6370000000 HC RX 637 (ALT 250 FOR IP): Performed by: INTERNAL MEDICINE

## 2022-10-25 PROCEDURE — G0378 HOSPITAL OBSERVATION PER HR: HCPCS

## 2022-10-25 PROCEDURE — 36415 COLL VENOUS BLD VENIPUNCTURE: CPT

## 2022-10-25 PROCEDURE — 6370000000 HC RX 637 (ALT 250 FOR IP): Performed by: NURSE PRACTITIONER

## 2022-10-25 PROCEDURE — 80048 BASIC METABOLIC PNL TOTAL CA: CPT

## 2022-10-25 PROCEDURE — 6360000002 HC RX W HCPCS: Performed by: HOSPITALIST

## 2022-10-25 PROCEDURE — 83735 ASSAY OF MAGNESIUM: CPT

## 2022-10-25 PROCEDURE — 96372 THER/PROPH/DIAG INJ SC/IM: CPT

## 2022-10-25 RX ORDER — HYDROXYZINE HYDROCHLORIDE 10 MG/1
10 TABLET, FILM COATED ORAL EVERY 6 HOURS PRN
Qty: 28 TABLET | Refills: 0
Start: 2022-10-25 | End: 2022-11-01

## 2022-10-25 RX ORDER — OXYCODONE HYDROCHLORIDE AND ACETAMINOPHEN 5; 325 MG/1; MG/1
1 TABLET ORAL EVERY 6 HOURS PRN
Qty: 12 TABLET | Refills: 0 | Status: SHIPPED | OUTPATIENT
Start: 2022-10-25 | End: 2022-10-28

## 2022-10-25 RX ADMIN — CARVEDILOL 3.12 MG: 3.12 TABLET, FILM COATED ORAL at 09:16

## 2022-10-25 RX ADMIN — HYDROXYZINE HYDROCHLORIDE 10 MG: 10 TABLET ORAL at 05:45

## 2022-10-25 RX ADMIN — FERROUS SULFATE TAB 325 MG (65 MG ELEMENTAL FE) 325 MG: 325 (65 FE) TAB at 09:16

## 2022-10-25 RX ADMIN — TORSEMIDE 50 MG: 100 TABLET ORAL at 09:17

## 2022-10-25 RX ADMIN — OXYCODONE AND ACETAMINOPHEN 1 TABLET: 325; 5 TABLET ORAL at 05:45

## 2022-10-25 RX ADMIN — Medication 10 ML: at 09:24

## 2022-10-25 RX ADMIN — POTASSIUM CHLORIDE 10 MEQ: 750 TABLET, EXTENDED RELEASE ORAL at 09:16

## 2022-10-25 RX ADMIN — ACETAMINOPHEN 650 MG: 325 TABLET ORAL at 11:43

## 2022-10-25 RX ADMIN — ISOSORBIDE MONONITRATE 30 MG: 30 TABLET, EXTENDED RELEASE ORAL at 09:17

## 2022-10-25 RX ADMIN — INSULIN LISPRO 4 UNITS: 100 INJECTION, SOLUTION INTRAVENOUS; SUBCUTANEOUS at 09:20

## 2022-10-25 RX ADMIN — ENOXAPARIN SODIUM 40 MG: 100 INJECTION SUBCUTANEOUS at 09:17

## 2022-10-25 RX ADMIN — INSULIN GLARGINE 35 UNITS: 100 INJECTION, SOLUTION SUBCUTANEOUS at 09:21

## 2022-10-25 RX ADMIN — TAMSULOSIN HYDROCHLORIDE 0.4 MG: 0.4 CAPSULE ORAL at 05:45

## 2022-10-25 RX ADMIN — HYDROXYZINE HYDROCHLORIDE 10 MG: 10 TABLET ORAL at 10:47

## 2022-10-25 ASSESSMENT — PAIN DESCRIPTION - LOCATION: LOCATION: BACK

## 2022-10-25 ASSESSMENT — PAIN SCALES - GENERAL
PAINLEVEL_OUTOF10: 6
PAINLEVEL_OUTOF10: 2

## 2022-10-25 NOTE — DISCHARGE INSTR - DIET

## 2022-10-25 NOTE — PLAN OF CARE
Problem: Discharge Planning  Goal: Discharge to home or other facility with appropriate resources  10/25/2022 0022 by Nathalie Cai RN  Outcome: Progressing     Problem: Pain  Goal: Verbalizes/displays adequate comfort level or baseline comfort level  10/25/2022 0022 by Nathalie Cai RN  Outcome: Progressing     Problem: Skin/Tissue Integrity  Goal: Absence of new skin breakdown  Description: 1. Monitor for areas of redness and/or skin breakdown  2. Assess vascular access sites hourly  3. Every 4-6 hours minimum:  Change oxygen saturation probe site  4. Every 4-6 hours:  If on nasal continuous positive airway pressure, respiratory therapy assess nares and determine need for appliance change or resting period.   10/25/2022 0022 by Nathalie Cai RN  Outcome: Progressing     Problem: ABCDS Injury Assessment  Goal: Absence of physical injury  10/25/2022 0022 by Nathalie Cai RN  Outcome: Progressing     Problem: Safety - Adult  Goal: Free from fall injury  10/25/2022 0022 by Nathalie Cai RN  Outcome: Progressing     Problem: Chronic Conditions and Co-morbidities  Goal: Patient's chronic conditions and co-morbidity symptoms are monitored and maintained or improved  10/25/2022 0022 by Nathalie Cai RN  Outcome: Progressing     Problem: Respiratory - Adult  Goal: Achieves optimal ventilation and oxygenation  10/25/2022 0022 by Nathalie Cai RN  Outcome: Progressing  Flowsheets (Taken 10/25/2022 0021)  Achieves optimal ventilation and oxygenation: Assess for changes in respiratory status     Problem: Cardiovascular - Adult  Goal: Maintains optimal cardiac output and hemodynamic stability  10/25/2022 0022 by Nathalie Cai RN  Outcome: Progressing  Flowsheets (Taken 10/25/2022 0021)  Maintains optimal cardiac output and hemodynamic stability: Monitor blood pressure and heart rate  Goal: Absence of cardiac dysrhythmias or at baseline  10/25/2022 0022 by Nathalie Cai RN  Outcome: Progressing Problem: Skin/Tissue Integrity - Adult  Goal: Skin integrity remains intact  10/25/2022 0022 by Deny Baker RN  Outcome: Progressing     Problem: Musculoskeletal - Adult  Goal: Return mobility to safest level of function  10/25/2022 0022 by Deny Baker RN  Outcome: Progressing     Problem: Metabolic/Fluid and Electrolytes - Adult  Goal: Electrolytes maintained within normal limits  10/25/2022 0022 by Deny Baker RN  Outcome: Progressing     Problem: Hematologic - Adult  Goal: Maintains hematologic stability  10/25/2022 0022 by Deny Baker RN  Outcome: Progressing

## 2022-10-25 NOTE — PROGRESS NOTES
Nephrology Progress Note   [facility]      This patient is a 75year old male whom we are following for MARGARITO on CKD. Subjective: The patient was seen and examined. Complains of anxiety  Is on RA. Weight up to 223lbs from 209lbs on 10/24. Wt decreased to 221 on 10/25 after torsemide initiation    Family History: No family at bedside  ROS: No nausea or vomiting      Vitals:  /68   Pulse 72   Temp 97.8 °F (36.6 °C) (Oral)   Resp 16   Ht 5' 9\" (1.753 m)   Wt 221 lb 14.4 oz (100.7 kg)   SpO2 99%   BMI 32.77 kg/m²   I/O last 3 completed shifts: In: 044 [P.O.:870]  Out: 3400 [Urine:3400]  I/O this shift:  In: 10 [I.V.:10]  Out: -       Physical Exam  Vitals reviewed. Constitutional:       General: He is not in acute distress. HENT:      Head: Normocephalic and atraumatic. Eyes:      General: No scleral icterus. Conjunctiva/sclera: Conjunctivae normal.   Cardiovascular:      Rate and Rhythm: Normal rate. Heart sounds: No friction rub. Pulmonary:      Effort: Pulmonary effort is normal. No respiratory distress. Breath sounds: No rales. Abdominal:      General: Bowel sounds are normal. There is no distension. Tenderness: There is no abdominal tenderness. Musculoskeletal:      Right lower leg: Edema present. Left lower leg: Edema present. Neurological:      Mental Status: He is alert.          Medications:   haloperidol lactate  5 mg IntraMUSCular Once    insulin lispro  10 Units SubCUTAneous TID WC    torsemide  50 mg Oral Daily    potassium chloride  10 mEq Oral Daily    povidone-iodine   Topical Daily    melatonin  5 mg Oral Nightly    enoxaparin  40 mg SubCUTAneous Daily    atorvastatin  80 mg Oral Nightly    carvedilol  3.125 mg Oral BID WC    ferrous sulfate  325 mg Oral BID    insulin glargine  35 Units SubCUTAneous Daily    isosorbide mononitrate  30 mg Oral Daily    linaclotide  290 mcg Oral QAM AC    tamsulosin  0.4 mg Oral Daily    sodium chloride flush  5-40 mL IntraVENous 2 times per day    insulin lispro  0-16 Units SubCUTAneous TID WC    insulin lispro  0-4 Units SubCUTAneous Nightly         Labs:  No results for input(s): WBC, HGB, HCT, MCV, PLT in the last 72 hours. Recent Labs     10/23/22  0734 10/23/22  1329 10/24/22  0025 10/24/22  1151 10/25/22  0730   *   < > 129* 130* 132*   K 3.7   < > 3.9 3.6 3.9   CL 89*   < > 91* 92* 90*   CO2 34*   < > 31 30 30   GLUCOSE 313*   < > 372* 276* 249*   MG 2.60*  --   --  2.40 2.00   *   < > 82* 76* 72*   CREATININE 1.5*   < > 1.5* 1.3 1.4*   LABGLOM 50*   < > 50* 60* 55*    < > = values in this interval not displayed. Assessment/Plan:    MARGARITO on CKD stage 3b. - Seen by Dr. Aj Olivares in the office in 03/2021. CKD presumed secondary to DM nephropathy and CRS. - Prior baseline serum creatinine was 1.7mg/dL but recently had been trending higher. Azotemic with need for chronic diuretics. - MARGARITO thought to be from overdiuresis, improved with holding diuretics and IVF hydration.  - Kidney function has been stable the past couple of days. - Avoid hypotension and nephrotoxic medications. Hyponatremia.  - Initially from hypovolemia with impaired free water excretion in the setting of renal dysfunction. Now seems hypervolemic + component of pseudohyponatremia from hyperglycemia.  - Monitor after starting diuretic. Hypertension.  - Episodes of low BP.  - On Carvedilol and Imdur. CHF.  - Restarted Torsemide 50mg daily from 10/24/22  - Sodium and fluid restriction.  - Daily weights. Dispo- ok to dc from renal std pt    Please do not hesitate to contact me at [phone] if with questions. Thank you! Dao Townsend MD  [facility] ORTHOPEDIC [facility] [employer]  10/25/2022

## 2022-10-25 NOTE — DISCHARGE INSTR - COC
Continuity of Care Form    Patient Name: Arsenio Almeida   :  1955  MRN:  [id number]    Admit date:  10/21/2022  Discharge date:  10/25/22  Code Status Order: Limited   Advance Directives: Limited Advanced Directives; No intubation    Admitting Physician:  No admitting provider for patient encounter. PCP: Ar Isaacs MD    Discharging Nurse: Jabari Gianna Mejia Unit/Room#: 1996/8654-44  Discharging Unit Phone Number: [phone]    Emergency Contact:   Extended Emergency Contact Information  Primary Emergency Contact: [address] of [address] [place] [address] Phone: [phone]  Mobile Phone: [phone]  Relation: Other   needed?  No  Secondary Emergency Contact: Sloan Dey  [place] Phone: [phone]  Mobile Phone: [phone]  Relation: Other    Past Surgical History:  Past Surgical History:   Procedure Laterality Date    CARDIAC SURGERY      Dec 27 2010, triple bypass    CHOLECYSTECTOMY  2011    HERNIA REPAIR  age2    KNEE ARTHROCENTESIS Right 10/6/2022    RIGHT SHOULDER INTRA ARTICULAR INJECTION SITE CONFIRMED BY FLUOROSCOPY performed by Momo Renteria MD at [facility] EG OR    OTHER SURGICAL HISTORY  11 REPAIR LOWER STERNAL INCISION, REMOVAL OF ONE STERNAL WIRE,    OTHER SURGICAL HISTORY  10/10/2014    phacoemulsification of cataract with intraocular lens implant left eye    PAIN MANAGEMENT PROCEDURE N/A 2/10/2022    MIDLINE CERVICAL SIX SEVEN EPIDURAL STEROID INJECTION SITE CONFIRMED BY FLUOROSCOPY performed by Momo Renteria MD at [place] [address] N/A 2022    MIDLINE TO RIGHT CERVICAL SIX SEVEN EPIDURAL STEROID INJECTION SITE CONFIRMED BY FLUOROSCOPY performed by Momo Renteria MD at [address] [place] [address] ENDOSCOPY         Immunization History:   Immunization History   Administered Date(s) Administered    COVID-19, PFIZER PURPLE top, DILUTE for use, (age 15 y+), 30mcg/0.3mL 2021, 04/06/2021, 12/01/2021    Influenza Virus Vaccine 11/03/2012, 10/02/2017, 10/01/2018, 09/23/2019, 09/29/2020    Influenza, FLUAD, (age 72 y+), Adjuvanted, 0.5mL 09/29/2020, 12/01/2021    Influenza, FLUARIX, FLULAVAL, FLUZONE (age 10 mo+) AND AFLURIA, (age 1 y+), PF, 0.5mL 09/26/2016, 10/02/2017, 09/29/2020    Influenza, FLUCELVAX, (age 10 mo+), MDCK, PF, 0.5mL 10/05/2018    Influenza, High Dose (Fluzone 65 yrs and older) 10/01/2019    Influenza, Triv, inactivated, subunit, adjuvanted, IM (Fluad 65 yrs and older) 09/13/2019    Pneumococcal Conjugate 13-valent ([id number]) 09/13/2019    Pneumococcal Polysaccharide ([id number]) 01/04/2011, 11/27/2014, 12/17/2020       Active Problems:  Patient Active Problem List   Diagnosis Code    DM (diabetes mellitus) ([facility] E11.9    Coronary artery disease involving native coronary artery of native heart without angina pectoris I25.10    Anemia of chronic disease D63.8    Essential hypertension I10    Hyperkalemia E87.5    CKD (chronic kidney disease) stage 3, GFR 30-59 ml/min ([facility]) N18.30    Chronic diastolic heart failure ([facility]) I50.32    Pulmonary hypertension due to left ventricular diastolic dysfunction ([facility]) I27.22    Obesity E66.9    S/P CABG x 3 Z95.1    DM2 (diabetes mellitus, type 2) ([facility]) E11.9    Morbid obesity with BMI of 50.0-59.9, adult ([facility]) E66.01, Z68.43    HLD (hyperlipidemia) E78.5    Cardiomyopathy ([facility] I42.9    Productive cough R05.8    Chronic pain in right shoulder M25.511, G89.29    Severe obstructive sleep apnea G47.33    Obesity, Class III, BMI 40-49.9 (morbid obesity) ([facility]) Z56.21    Acute diastolic congestive heart failure ([facility]) I50.31    Degeneration of lumbar or lumbosacral intervertebral disc M51.37    Displacement of lumbar intervertebral disc without myelopathy M51.26    Lumbosacral spondylosis without myelopathy M47.817    Lumbar facet arthropathy M47.816    Disc displacement, lumbar M51.26    Spinal stenosis of lumbar region M48.061    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    Tympanic membrane perforation, left H72.92    Chest pain R07.9    Cor pulmonale, chronic ([facility]) I27.81    Pulmonary hypertension due to alveolar hypoventilation disorder ([facility]) I27.23    Nonrheumatic aortic valve stenosis I35.0    Chronic neck pain M54.2, G89.29    Dyspnea T31.72    Acute diastolic CHF (congestive heart failure) ([facility]) I50.31    Cervical stenosis of spinal canal M48.02    Degeneration of cervical intervertebral disc M50.30    Cervical radiculitis M54.12    Acute on chronic diastolic CHF (congestive heart failure) ([facility]) I50.33    Acute respiratory failure with hypoxia ([facility]) J96.01    CKD (chronic kidney disease) N18.9    PAF (paroxysmal atrial fibrillation) ([facility]) Z30.0    Acute diastolic HF (heart failure) ([facility]) I50.31    Subdural hematoma S06. 5XAA    SDH (subdural hematoma) S06. 5XAA    Chronic respiratory failure with hypoxia ([facility]) J96.11    Fluid overload E87.70    MARGARITO (acute kidney injury) ([facility] N17.9    Dehydration E86.0       Isolation/Infection:   Isolation            No Isolation          Patient Infection Status       Infection Onset Added Last Indicated Last Indicated By Review Planned Expiration Resolved Resolved By    None active    Resolved    COVID-19 (Rule Out) 03/07/22 03/07/22 03/07/22 COVID-19, Rapid (Ordered)   03/07/22 Rule-Out Test Resulted    COVID-19 (Rule Out) 01/13/22 01/13/22 01/13/22 SARS-CoV-2 NAAT (Rapid) (Ordered)   01/13/22 Rule-Out Test Resulted    COVID-19 (Rule Out) 08/23/21 08/23/21 08/23/21 COVID-19, Rapid (Ordered)   08/23/21 Rule-Out Test Resulted            Nurse Assessment:  Last Vital Signs: /68   Pulse 72   Temp 97.8 °F (36.6 °C) (Oral)   Resp 16   Ht 5' 9\" (1.753 m)   Wt 221 lb 14.4 oz (100.7 kg)   SpO2 99%   BMI 32.77 kg/m²     Last documented pain score (0-10 scale): Pain Level: 2  Last Weight:   Wt Readings from Last 1 Encounters:   10/25/22 221 lb 14.4 oz (100.7 kg)     Mental Status:  {IP PT MENTAL STATUS:}    IV Access:  { [facility] IV ACCESS:183423337}    Nursing Mobility/ADLs:  Walking   {[facility] DME DCWM:192609330}  Transfer  {P DME SWMK:044388148}  Bathing  {P DME XURV:954951282}  Dressing  {P DME AGNQ:772485037}  Toileting  {P DME NVIQ:954811677}  Feeding  {[facility] DME DWAU:287773620}  Med Admin  {[facility] DME IVQF:345384321}  Med Delivery   { [facility] MED Delivery:410263040}    Wound Care Documentation and Therapy:        Elimination:  Continence: Bowel: {YES / BQ:79155}  Bladder: {YES / UV:16218}  Urinary Catheter: {Urinary Catheter:313800608}   Colostomy/Ileostomy/Ileal Conduit: {YES / NW:95618}       Date of Last BM: ***    Intake/Output Summary (Last 24 hours) at 10/25/2022 1041  Last data filed at 10/25/2022 0924  Gross per 24 hour   Intake 805 ml   Output 1500 ml   Net -695 ml     I/O last 3 completed shifts:   In: 80 [P.O.:870]  Out: 46 [Urine:3400]    Safety Concerns:     [address] Safety Concerns:009731942}    Impairments/Disabilities:      [address] Impairments/Disabilities:307080132}    Nutrition Therapy:  Current Nutrition Therapy:   [address] Diet List:090915267}    Routes of Feeding: {[facility] DME Other Feedings:322491986}  Liquids: {Slp liquid thickness:53686}  Daily Fluid Restriction: {P DME Yes amt example:104570714}  Last Modified Barium Swallow with Video (Video Swallowing Test): {Done Not Done DSNT:990104446}    Treatments at the Time of Hospital Discharge:   Respiratory Treatments: ***  Oxygen Therapy:  {Therapy; copd oxygen:69416}  Ventilator:    { CC Vent BBIT:578903573}    Rehab Therapies: {THERAPEUTIC INTERVENTION:7460523745}  Weight Bearing Status/Restrictions: [address] [employer]  Weight Bearin}  Other Medical Equipment (for information only, NOT a DME order):  {EQUIPMENT:079725271}  Other Treatments: ***    Patient's personal belongings (please select all that are sent with patient):  {[facility] DME Belongings:532691118}    RN SIGNATURE: {Esignature:742035654}    CASE MANAGEMENT/SOCIAL WORK SECTION    Inpatient Status Date: 10/21/22    Readmission Risk Assessment Score:  Readmission Risk              Risk of Unplanned Readmission:  0           [facility]   [address] [facility] [address]   [phone]     / signature: Electronically signed by Janki Michele RN on 10/25/22 at 11:00 AM EDT    PHYSICIAN SECTION    Prognosis: Good    Condition at Discharge: Stable    Rehab Potential (if transferring to Rehab): Good    Recommended Labs or Other Treatments After Discharge:     Physician Certification: I certify the above information and transfer of Trevin Hughes  is necessary for the continuing treatment of the diagnosis listed and that he requires Intermediate Nursing Care for greater 30 days.      Update Admission H&P: No change in H&P    PHYSICIAN SIGNATURE:  Electronically signed by Tk Thompson MD on 10/25/22 at 10:42 AM EDT2

## 2022-10-25 NOTE — PLAN OF CARE
Problem: Discharge Planning  Goal: Discharge to home or other facility with appropriate resources  10/25/2022 1339 by João Bingham RN  Outcome: Completed  10/25/2022 0022 by Rhina Alejo RN  Outcome: Progressing     Problem: Pain  Goal: Verbalizes/displays adequate comfort level or baseline comfort level  10/25/2022 1339 by João Bingham RN  Outcome: Completed  10/25/2022 0022 by Rhina Alejo RN  Outcome: Progressing     Problem: Skin/Tissue Integrity  Goal: Absence of new skin breakdown  Description: 1. Monitor for areas of redness and/or skin breakdown  2. Assess vascular access sites hourly  3. Every 4-6 hours minimum:  Change oxygen saturation probe site  4. Every 4-6 hours:  If on nasal continuous positive airway pressure, respiratory therapy assess nares and determine need for appliance change or resting period.   10/25/2022 1339 by João Bingham RN  Outcome: Completed  10/25/2022 0022 by Rhina Alejo RN  Outcome: Progressing     Problem: ABCDS Injury Assessment  Goal: Absence of physical injury  10/25/2022 1339 by João Bingham RN  Outcome: Completed  10/25/2022 0022 by Rhina Alejo RN  Outcome: Progressing     Problem: Safety - Adult  Goal: Free from fall injury  10/25/2022 1339 by João Bingham RN  Outcome: Completed  10/25/2022 0022 by Rhina Alejo RN  Outcome: Progressing     Problem: Chronic Conditions and Co-morbidities  Goal: Patient's chronic conditions and co-morbidity symptoms are monitored and maintained or improved  10/25/2022 1339 by João Bingham RN  Outcome: Completed  10/25/2022 0022 by Rhina Alejo RN  Outcome: Progressing     Problem: Respiratory - Adult  Goal: Achieves optimal ventilation and oxygenation  10/25/2022 1339 by João Bingham RN  Outcome: Completed  10/25/2022 0022 by Rhina Alejo RN  Outcome: Progressing  Flowsheets (Taken 10/25/2022 0021)  Achieves optimal ventilation and oxygenation: Assess for changes in respiratory status     Problem: Cardiovascular - Adult  Goal: Maintains optimal cardiac output and hemodynamic stability  10/25/2022 1339 by Nafisa Granado RN  Outcome: Completed  10/25/2022 0022 by Lucas Kelly RN  Outcome: Progressing  Flowsheets (Taken 10/25/2022 0021)  Maintains optimal cardiac output and hemodynamic stability: Monitor blood pressure and heart rate  Goal: Absence of cardiac dysrhythmias or at baseline  10/25/2022 1339 by Nafisa Granado RN  Outcome: Completed  10/25/2022 0022 by Lucas Kelly RN  Outcome: Progressing     Problem: Skin/Tissue Integrity - Adult  Goal: Skin integrity remains intact  10/25/2022 1339 by Nafisa Granado RN  Outcome: Completed  10/25/2022 0022 by Lucas Kelly RN  Outcome: Progressing     Problem: Musculoskeletal - Adult  Goal: Return mobility to safest level of function  10/25/2022 1339 by Nafisa Granado RN  Outcome: Completed  10/25/2022 0022 by Lucas Kelly RN  Outcome: Progressing     Problem: Metabolic/Fluid and Electrolytes - Adult  Goal: Electrolytes maintained within normal limits  10/25/2022 1339 by Nafisa Granado RN  Outcome: Completed  10/25/2022 0022 by Lucas Kelly RN  Outcome: Progressing     Problem: Hematologic - Adult  Goal: Maintains hematologic stability  10/25/2022 1339 by Nafisa Granado RN  Outcome: Completed  10/25/2022 0022 by Lucas Kelly RN  Outcome: Progressing

## 2022-10-25 NOTE — CARE COORDINATION
CASE MANAGEMENT DISCHARGE SUMMARY      Discharge to: [facility] completed: [facility] Exemption Notification (HENS) completed: NA    IMM given: (date) OBS    New Durable Medical Equipment ordered/agency:     Transportation:       Medical Transport explained to [facility]. Pt/family voice no agency preference. Agency used: [facility] up time: 12:45PM   Ambulance form completed: Yes    Confirmed discharge plan with:     Patient: yes     Family:  pt will notify     Facility/Agency, name:  [facility] faxed   Phone number for report to facility: [phone]     RN, name: Florin    Note: Discharging nurse to complete [facility], reconcile [facility], and place final copy with patient's discharge packet. RN to ensure that written prescriptions for  Level II medications are sent with patient to the facility as per protocol.

## 2022-10-25 NOTE — PROGRESS NOTES
Patient discharged/returning to [facility]. Attempted to call report but no answer. Iv removed, tele removed, report given to transport staff. Belongings sent with patient.

## 2022-10-26 ENCOUNTER — OFFICE VISIT (OUTPATIENT)
Dept: CARDIOLOGY CLINIC | Age: 67
End: 2022-10-26
Payer: MEDICARE

## 2022-10-26 VITALS
HEIGHT: 69 IN | OXYGEN SATURATION: 94 % | DIASTOLIC BLOOD PRESSURE: 58 MMHG | SYSTOLIC BLOOD PRESSURE: 102 MMHG | WEIGHT: 221 LBS | BODY MASS INDEX: 32.73 KG/M2 | HEART RATE: 82 BPM

## 2022-10-26 DIAGNOSIS — N18.4 STAGE 4 CHRONIC KIDNEY DISEASE (HCC): ICD-10-CM

## 2022-10-26 DIAGNOSIS — I50.32 CHRONIC DIASTOLIC HEART FAILURE (HCC): Primary | ICD-10-CM

## 2022-10-26 DIAGNOSIS — I27.81 COR PULMONALE, CHRONIC (HCC): ICD-10-CM

## 2022-10-26 DIAGNOSIS — I48.0 PAF (PAROXYSMAL ATRIAL FIBRILLATION) (HCC): ICD-10-CM

## 2022-10-26 DIAGNOSIS — I10 ESSENTIAL HYPERTENSION: ICD-10-CM

## 2022-10-26 PROCEDURE — 3078F DIAST BP <80 MM HG: CPT | Performed by: NURSE PRACTITIONER

## 2022-10-26 PROCEDURE — G8417 CALC BMI ABV UP PARAM F/U: HCPCS | Performed by: NURSE PRACTITIONER

## 2022-10-26 PROCEDURE — 1123F ACP DISCUSS/DSCN MKR DOCD: CPT | Performed by: NURSE PRACTITIONER

## 2022-10-26 PROCEDURE — 1036F TOBACCO NON-USER: CPT | Performed by: NURSE PRACTITIONER

## 2022-10-26 PROCEDURE — G8427 DOCREV CUR MEDS BY ELIG CLIN: HCPCS | Performed by: NURSE PRACTITIONER

## 2022-10-26 PROCEDURE — 3074F SYST BP LT 130 MM HG: CPT | Performed by: NURSE PRACTITIONER

## 2022-10-26 PROCEDURE — 3017F COLORECTAL CA SCREEN DOC REV: CPT | Performed by: NURSE PRACTITIONER

## 2022-10-26 PROCEDURE — 99214 OFFICE O/P EST MOD 30 MIN: CPT | Performed by: NURSE PRACTITIONER

## 2022-10-26 PROCEDURE — G8484 FLU IMMUNIZE NO ADMIN: HCPCS | Performed by: NURSE PRACTITIONER

## 2022-10-26 RX ORDER — TORSEMIDE 100 MG/1
50 TABLET ORAL DAILY
Qty: 15 TABLET | Refills: 3 | Status: SHIPPED | OUTPATIENT
Start: 2022-10-26

## 2022-10-26 NOTE — PATIENT INSTRUCTIONS
Plan:   Continue daily weights  Adjust torsemide from 50mg twice a day to 50mg once a day per nephrology recommendations at discharge yesterday.    Hold further metolazone  Reduce the potassium to 10meq daily   Repeat BMP, BNP, magnesium weekly for 2 weeks - fax results to [phone]  Follow up in 6 weeks

## 2022-10-26 NOTE — PROGRESS NOTES
[address]   Cardiac Follow-up    Primary Care Doctor:  Gio Costa MD    Chief Complaint   Patient presents with    3 Month Follow-Up    Congestive Heart Failure            History of Present Illness:   I had the pleasure of seeing Enma Quintero in follow up for diastolic heart failure, HTN, CAD s/p CABG, pHTN, CKD. Since last visit, he was directly admitted from my office to [facility] for volume overload, treated with lasix infusion on 8/24/22-9/1/22. Admission weight up to 302lbs; He had 8L paracentesis. Discharged on torsemide 100mg BID and metolazone three times a week per nephrology. Recently admitted  10/21/22- 10/25/22 with fatigue and abnormal labs with  and creatinine 1.9. treated with IVFs. Admissions weight  was 194lbs  He was discahrged yesterday to the nursing home. He was very frustrated with the waiting and the bed in the hospital. He was having a lot of anxiety. Treated for anxiety for the shortness of breath. Was having a lot of palpitations once the anxiety medication wears off. Having nightmares. Nephrology recommended torsemide 50mg daily on 10/24/22. Nursing home med list has 50mg Twice a day and metolazone three times a week     Edema is very stable for him. He is off of oxygen. Abd feels better     Enma Quintero describes symptoms including edema but denies chest pain, dyspnea, palpitations, syncope.      Home weights: 221 lbs at nursing home   Appetite:   Fluid intake:  Diet: nursing home diet     NYHA:   III  ACC/ AHA Stage:    C      Past Medical History:   has a past medical history of Anemia, Angina, Arthritis, CAD (coronary artery disease), CHF (congestive heart failure) ([address] CKD (chronic kidney disease) stage 3, GFR 30-59 ml/min ([facility]), Clostridium difficile diarrhea, Diabetes mellitus ([address] Diabetic neuropathy ([address] Disease of blood and blood forming organ, Dizziness, GERD (gastroesophageal reflux disease), Headache, Hearing loss, Hyperlipidemia, Hypertension, Kidney stone, Refusal of blood transfusions as patient is Yarsani, Sleep apnea, Tinnitus, Venous ulcer ([address] and Wound, open. Surgical History:   has a past surgical history that includes hernia repair (age1); Cholecystectomy (9/2011); other surgical history (11-16-11 REPAIR LOWER STERNAL INCISION, REMOVAL OF ONE STERNAL WIRE,); Upper gastrointestinal endoscopy; Cardiac surgery; other surgical history (10/10/2014); Pain management procedure (N/A, 2/10/2022); Pain management procedure (N/A, 7/21/2022); and Knee Arthrocentesis (Right, 10/6/2022). Social History:   reports that he quit smoking about 34 years ago. His smoking use included cigarettes. He has a 16.00 pack-year smoking history. He has never used smokeless tobacco. He reports that he does not drink alcohol and does not use drugs. Family History:   Family History   Problem Relation Age of Onset    Heart Disease Mother     Heart Disease Father     Cancer Father     Diabetes Brother     Diabetes Brother        Home Medications:  Prior to Admission medications    Medication Sig Start Date End Date Taking? Authorizing Provider   oxyCODONE-acetaminophen (PERCOCET) 5-325 MG per tablet Take 1 tablet by mouth every 6 hours as needed for Pain for up to 3 days.  10/25/22 10/28/22 Yes Shyam Valenzuela MD   hydrOXYzine HCl (ATARAX) 10 MG tablet Take 1 tablet by mouth every 6 hours as needed for Anxiety 10/25/22 11/1/22 Yes Shyam Valenzuela MD   acetaminophen (TYLENOL) 325 MG tablet Take 650 mg by mouth every 4 hours as needed for Pain   Yes Historical Provider, MD   cyclobenzaprine (FLEXERIL) 5 MG tablet Take 5 mg by mouth every 8 hours as needed for Muscle spasms   Yes Historical Provider, MD   insulin glargine (LANTUS SOLOSTAR) 100 UNIT/ML injection pen Inject 40 Units into the skin daily   Yes Historical Provider, MD   insulin lispro, 1 Unit Dial, (HUMALOG/ADMELOG) 100 UNIT/ML SOPN Inject into the skin 3 times daily Per sliding scale   Yes Historical Provider, MD   meclizine (ANTIVERT) 25 MG tablet Take 25 mg by mouth every 8 hours as needed for Dizziness   Yes Historical Provider, MD   pregabalin (LYRICA) 25 MG capsule Take 25 mg by mouth every 12 hours as needed (pain).    Yes Historical Provider, MD   polyethyl glycol-propyl glycol 0.4-0.3 % (SYSTANE ULTRA) 0.4-0.3 % ophthalmic solution Place 1 drop into both eyes 4 times daily as needed for Dry Eyes   Yes Historical Provider, MD   polyethylene glycol (GLYCOLAX) 17 GM/SCOOP powder Take 17 g by mouth daily as needed (constipation)   Yes Historical Provider, MD   magnesium oxide (MAG-OX) 400 (240 Mg) MG tablet Take 1 tablet by mouth 2 times daily 9/1/22  Yes EYAD Maddox   spironolactone (ALDACTONE) 25 MG tablet Take 1 tablet by mouth daily 9/2/22  Yes EYAD Maddox   metOLazone (ZAROXOLYN) 5 MG tablet 1 tab three times a week (Monday, Wednesday, and fridays) 9/1/22  Yes EYAD Maddox   potassium chloride (KLOR-CON M) 20 MEQ extended release tablet Take 20 mEq by mouth daily 7/29/22  Yes Historical Provider, MD   torsemide (DEMADEX) 100 MG tablet Take 1 tablet by mouth in the morning and at bedtime 8/23/22  Yes Ade Muse, APRN - CNP   glucagon 1 MG injection Inject 1 kit into the muscle as needed (for hypoglycemia) Emergencies only   Yes Historical Provider, MD   loperamide (IMODIUM) 2 MG capsule Take 2 mg by mouth as needed for Diarrhea   Yes Historical Provider, MD   magnesium hydroxide (MILK OF MAGNESIA) 400 MG/5ML suspension Take 30 mLs by mouth daily as needed for Constipation   Yes Historical Provider, MD   ondansetron (ZOFRAN-ODT) 4 MG disintegrating tablet Take 4 mg by mouth every 6 hours as needed for Nausea 5/16/22  Yes Historical Provider, MD   RHOPRESSA 0.02 % SOLN Place 1 drop into the left eye nightly 5/19/22  Yes Historical Provider, MD   dorzolamide (TRUSOPT) 2 % ophthalmic solution Place 1 drop into the left eye 2 times daily 5/21/22  Yes Historical Provider, MD   brimonidine-timolol (COMBIGAN) 0.2-0.5 % ophthalmic solution Place 1 drop into the left eye 2 times daily 5/20/22  Yes Historical Provider, MD   isosorbide mononitrate (IMDUR) 30 MG extended release tablet Take 1 tablet by mouth daily 5/26/22  Yes PATRICK Queen CNP   carvedilol (COREG) 3.125 MG tablet Take 1 tablet by mouth 2 times daily (with meals) 4/9/22  Yes Alvaro Baird MD   linaclotide Gwen Estrada) 290 MCG CAPS capsule Take 1 capsule by mouth every morning (before breakfast) 4/10/22  Yes Alvaro Barid MD   tamsulosin (FLOMAX) 0.4 MG capsule Take 0.8 mg by mouth Daily 12/30/21  Yes Historical Provider, MD   atorvastatin (LIPITOR) 80 MG tablet Take 1 tablet by mouth nightly 3/14/22  Yes PATRICK Queen CNP   allopurinol (ZYLOPRIM) 300 MG tablet Take 1 tablet by mouth daily 3/15/22  Yes Deysi Whitley MD   naloxone 4 MG/0.1ML LIQD nasal spray 1 spray by Nasal route as needed for Opioid Reversal 11/23/21  Yes Osbaldo Sawyer MD   senna (SENOKOT) 8.6 MG TABS tablet Take 1 tablet by mouth 2 times daily 7/8/19  Yes PATRICK Medeiros - CNP   docusate sodium (COLACE, DULCOLAX) 100 MG CAPS Take 100 mg by mouth 2 times daily 5/1/18  Yes [facility] Hospital[facility] C[facility]   silver sulfADIAZINE (SILVADENE) 1 % cream Apply to BL lower leg ulcers daily 2/9/16  Yes Rachid Locke MD   fluticasone (FLONASE) 50 MCG/ACT nasal spray 1 spray by Each Nostril route daily as needed for Allergies   Yes Historical Provider, MD   ferrous sulfate 325 (65 FE) MG tablet Take 325 mg by mouth 2 times daily  12/22/10  Yes Historical Provider, MD   omeprazole (PRILOSEC) 20 MG delayed release capsule Take 20 mg by mouth 2 times daily.  12/22/10  Yes Historical Provider, MD   Sodium Phosphates (FLEET) 7-19 GM/118ML Place 1 enema rectally daily as needed  Patient not taking: Reported on 10/26/2022    Historical Provider, MD   nitroGLYCERIN (NITROSTAT) 0.4 MG SL tablet Place 1 tablet under the tongue every 5 minutes as needed  Patient not taking: No sig reported 8/17/15 9/1/22  PATRICK Toussaint CNP        Allergies:  Amitriptyline, Celecoxib, Cymbalta [duloxetine hcl], Elavil [amitriptyline hcl], Gabapentin, and Nsaids     Physical Examination:    Vitals:    10/26/22 0952 10/26/22 1000   BP: (!) 108/58 (!) 102/58   Site: Left Upper Arm Left Upper Arm   Pulse: 82    SpO2: 94%    Weight: 221 lb (100.2 kg)    Height: 5' 9\" (1.753 m)             Constitutional and General Appearance: no apparent distress, obese  HEENT: non-icteric sclera,  disconjugate gaze  Neck: JVP difficult to assess   Respiratory:  No use of accessory muscles  Diminished breath sounds throughout, no wheezing, no crackles, no rhonchi  Cardiovascular:  + 3/6 high pitched systolic murmur loudest at second right sternal border, no rub or gallop  Regular rate and rhythm, S1,S2 distant  Radial pulses 2+ and equal bilaterally  +  1 BLE edema BLE  Abdomen:  No masses or tenderness  Liver: No Abnormalities Noted  Musculoskeletal/Skin:  Wheelchair   There is no clubbing, cyanosis of the extremities  Skin is warm and dry  Moves all extremities   Neurological/Psychiatric:  Alert and oriented in all spheres  No abnormalities of mood, affect, memory, mentation, or behavior are noted    Lab Data:  CBC:   Lab Results   Component Value Date/Time    WBC 8.1 10/22/2022 07:01 AM    WBC 8.1 10/21/2022 11:50 AM    WBC 5.3 08/31/2022 07:07 AM    RBC 4.41 10/22/2022 07:01 AM    RBC 4.35 10/21/2022 11:50 AM    RBC 3.52 08/31/2022 07:07 AM    HGB 12.0 10/22/2022 07:01 AM    HGB 11.9 10/21/2022 11:50 AM    HGB 9.4 08/31/2022 07:07 AM    HCT 36.4 10/22/2022 07:01 AM    HCT 35.5 10/21/2022 11:50 AM    HCT 29.1 08/31/2022 07:07 AM    MCV 82.5 10/22/2022 07:01 AM    MCV 81.5 10/21/2022 11:50 AM    MCV 82.5 08/31/2022 07:07 AM    RDW 18.4 10/22/2022 07:01 AM    RDW 18.8 10/21/2022 11:50 AM    RDW 19.0 08/31/2022 07:07 AM     10/22/2022 07:01 AM    PLT 174 10/21/2022 11:50 AM     08/31/2022 07:07 AM     BMP:   Lab Results   Component Value Date/Time     10/25/2022 07:30 AM     10/24/2022 11:51 AM     10/24/2022 12:25 AM    K 3.9 10/25/2022 07:30 AM    K 3.6 10/24/2022 11:51 AM    K 3.9 10/24/2022 12:25 AM    K 4.0 10/21/2022 11:50 AM    K 3.8 05/28/2022 10:57 PM    K 4.6 04/25/2022 08:26 AM    CL 90 10/25/2022 07:30 AM    CL 92 10/24/2022 11:51 AM    CL 91 10/24/2022 12:25 AM    CO2 30 10/25/2022 07:30 AM    CO2 30 10/24/2022 11:51 AM    CO2 31 10/24/2022 12:25 AM    PHOS 3.4 09/01/2022 08:20 AM    PHOS 4.1 08/24/2022 12:16 PM    PHOS 3.4 05/03/2022 08:46 AM    BUN 72 10/25/2022 07:30 AM    BUN 76 10/24/2022 11:51 AM    BUN 82 10/24/2022 12:25 AM    CREATININE 1.4 10/25/2022 07:30 AM    CREATININE 1.3 10/24/2022 11:51 AM    CREATININE 1.5 10/24/2022 12:25 AM     BNP:   Lab Results   Component Value Date/Time    PROBNP 2,317 10/24/2022 11:51 AM    PROBNP 6,892 08/24/2022 12:16 PM    PROBNP 6,526 08/18/2022 03:28 PM       Recent Testing:  Echo 9/2016  Left ventricular systolic function is normal with ejection fraction   estimated at 55 %. Diastolic septal flattening consistent with right ventricular overload. Diastolic filling parameters suggests grade II diastolic dysfunction. Mild aortic stenosis. Right ventricle is mildly enlarged. Right ventricular systolic function is normal, TAPSE 19 mm. Inadequate tricuspid regurgitation jet to estimate systolic pulmonary artery   pressure (SPAP). Echocardiogram 1/13/2022   Summary   Limited only f/u for LVEF and aortic valve stenosis. Technically difficult examination. Low normal left ventricular systolic function with ejection fraction of 50%. Moderate aortic stenosis. Aortic stenosis values appear approximately same when compared to echo on 8-. Mild to moderate aortic regurgitation.     Assessment:   chronic diastolic heart failure  CAD s/p CABG x3  PAF; patient declines anticoagulation is willing to accept stroke risk per year  Moderate aortic stenosis  CKD - baseline creatinine 1.7   chronic hypoxemic respiratory failure  Severe HENNA  Diabetes  HLD  HTN  Anemia, patient is Sikhism  Fall s/p Subdural hematoma 5/28/22  Chronic venous stasis     1. Chronic diastolic heart failure (HCC)    2. Stage 4 chronic kidney disease ([facility] [address]    3. Cor pulmonale, chronic ([facility] [address]    4. Essential hypertension    5. PAF (paroxysmal atrial fibrillation) (HCC)      Plan:   Continue daily weights  Adjust torsemide from 50mg twice a day to 50mg once a day per nephrology recommendations at discharge yesterday. Hold further metolazone  Reduce the potassium to 10meq daily   Repeat BMP, BNP, magnesium weekly for 2 weeks - fax results to [phone]  Follow up in 6 weeks     I appreciate the opportunity for caring for this patient.      PATRICK Batista - CNP, CNP, 10/26/2022, 2:46 PM

## 2022-10-26 NOTE — DISCHARGE SUMMARY
Hospital Medicine Discharge Summary    Patient ID: Lawrence Arnett      Patient's PCP: Maty Reyez MD    Admit Date: 10/21/2022     Discharge Date: 10/25/2022      Admitting Provider: No admitting provider for patient encounter. Discharge Provider: Karlee Woo MD     Discharge Diagnoses: Active Hospital Problems    Diagnosis     Dehydration [E86.0]      Priority: Medium    Obesity [E66.9]     CKD (chronic kidney disease) stage 3, GFR 30-59 ml/min ([facility]) [N18.30]     Essential hypertension [I10]     DM (diabetes mellitus) ([facility] [E11.9]     Coronary artery disease involving native coronary artery of native heart without angina pectoris [I25.10]        The patient was seen and examined on day of discharge and this discharge summary is in conjunction with any daily progress note from day of discharge. Hospital Course:   75 y.o. male, with past medical history of CKD, hypertension, CAD, obesity and CHF, who presented to [facility] with abnormal lab work. History obtained from the patient and review of EMR. The patient stated he has been experiencing fatigue for the last couple of days. He states that he has been eating and drinking within his normal limits, but is still not feeling well. The patient reported that he did have blood work done and was told he had some abnormal renal labs and should be evaluated at the emergency room. Upon arrival to the emergency room the patient was found to have a BUN of 154 and creatinine of 1.9. The patient does have a history of chronic renal failure with a baseline creatinine of 1.5-1.7. This is likely prerenal secondary to dehydration. Nephrology was consulted in the emergency room. The patient was admitted for further evaluation and treatment. Normal saline was initiated at 60 mL/h. The patient denied any other associated symptoms as well as any aggravating and/or alleviating factors.  At the time of this assessment, the patient was resting comfortably in bed. He currently denies any chest pain, back pain, abdominal pain, shortness of breath, numbness, tingling, N/V/C/D, fever and/or chills. MARGARITO on CKD  - nephrology consulted  - 2/2 overdiuresis  - s/p IVF  - restarted home diuretic     Chronic diastolic heart failure  - appears euvolemic  - continue home torsemide, aldactone     HTN  - well controlled  - continue coreg. , aldactone     HLD  - continue statin     DMII  - poorly controlled  - resume home regimen on discharge     Anemia  - chronic, stable    Physical Exam Performed:     BP (!) 100/55   Pulse 96   Temp 98.1 °F (36.7 °C) (Oral)   Resp 16   Ht 5' 9\" (1.753 m)   Wt 221 lb 14.4 oz (100.7 kg)   SpO2 (!) 89%   BMI 32.77 kg/m²       General appearance: No apparent distress, appears stated age and cooperative. HEENT: Pupils equal, round, and reactive to light. Conjunctivae/corneas clear. Neck: Supple, with full range of motion. No jugular venous distention. Trachea midline. Respiratory:  Normal respiratory effort. Clear to auscultation, bilaterally without Rales/Wheezes/Rhonchi. Cardiovascular: Regular rate and rhythm with normal S1/S2 without murmurs, rubs or gallops. Abdomen: Soft, non-tender, non-distended with normal bowel sounds. Musculoskeletal: No clubbing, cyanosis or edema bilaterally. Full range of motion without deformity. Ace wrap bilateral lower extremity  Skin: Skin color, texture, turgor normal.  No rashes or lesions. Neurologic:  Neurovascularly intact without any focal sensory/motor deficits. Cranial nerves: II-XII intact, grossly non-focal.  Psychiatric: Alert and oriented, thought content appropriate, normal insight  Capillary Refill: Brisk, 3 seconds, normal   Peripheral Pulses: +2 palpable, equal bilaterally       Labs:  For convenience and continuity at follow-up the following most recent labs are provided:      CBC:    Lab Results   Component Value Date/Time    WBC 8.1 10/22/2022 07:01 AM    HGB 12.0 10/22/2022 07:01 AM    HCT 36.4 10/22/2022 07:01 AM     10/22/2022 07:01 AM       Renal:    Lab Results   Component Value Date/Time     10/25/2022 07:30 AM    K 3.9 10/25/2022 07:30 AM    K 4.0 10/21/2022 11:50 AM    CL 90 10/25/2022 07:30 AM    CO2 30 10/25/2022 07:30 AM    BUN 72 10/25/2022 07:30 AM    CREATININE 1.4 10/25/2022 07:30 AM    CALCIUM 10.0 10/25/2022 07:30 AM    PHOS 3.4 09/01/2022 08:20 AM         Significant Diagnostic Studies    Radiology:   XR CHEST PORTABLE   Final Result   Cardiomegaly with pulmonary vascular congestion. No evidence of overt edema                Consults:     IP CONSULT TO NEPHROLOGY  IP CONSULT TO NEPHROLOGY  IP CONSULT TO PHARMACY    Disposition:  [facility]    Condition at Discharge: Stable    Discharge Instructions/Follow-up:  Follow up with PCP, nephrology within 1-2 weeks    Code Status:  full code    Activity: activity as tolerated    Diet: diabetic diet      Discharge Medications:     Discharge Medication List as of 10/25/2022  1:00 PM             Details   hydrOXYzine HCl (ATARAX) 10 MG tablet Take 1 tablet by mouth every 6 hours as needed for Anxiety, Disp-28 tablet, R-0NO PRINT                Details   oxyCODONE-acetaminophen (PERCOCET) 5-325 MG per tablet Take 1 tablet by mouth every 6 hours as needed for Pain for up to 3 days. , Disp-12 tablet, R-0Print                Details   acetaminophen (TYLENOL) 325 MG tablet Take 650 mg by mouth every 4 hours as needed for PainHistorical Med      cyclobenzaprine (FLEXERIL) 5 MG tablet Take 5 mg by mouth every 8 hours as needed for Muscle spasmsHistorical Med      insulin glargine (LANTUS SOLOSTAR) 100 UNIT/ML injection pen Inject 40 Units into the skin dailyHistorical Med      insulin lispro, 1 Unit Dial, (HUMALOG KWIKPEN) 100 UNIT/ML SOPN Inject into the skin 3 times daily Per sliding scaleHistorical Med      meclizine (ANTIVERT) 25 MG tablet Take 25 mg by mouth every 8 hours as needed for DizzinessHistorical Med      pregabalin (LYRICA) 25 MG capsule Take 25 mg by mouth every 12 hours as needed (pain). Historical Med      polyethyl glycol-propyl glycol 0.4-0.3 % (SYSTANE ULTRA) 0.4-0.3 % ophthalmic solution Place 1 drop into both eyes 4 times daily as needed for Dry EyesHistorical Med      polyethylene glycol (GLYCOLAX) 17 GM/SCOOP powder Take 17 g by mouth daily as needed (constipation)Historical Med      magnesium oxide (MAG-OX) 400 (240 Mg) MG tablet Take 1 tablet by mouth 2 times daily, Disp-30 tablet, R-0Normal      spironolactone (ALDACTONE) 25 MG tablet Take 1 tablet by mouth daily, Disp-30 tablet, R-3Normal      metOLazone (ZAROXOLYN) 5 MG tablet 1 tab three times a week (Monday, Wednesday, and fridays), Disp-24 tablet, R-0Normal      potassium chloride (KLOR-CON M) 20 MEQ extended release tablet Take 20 mEq by mouth dailyHistorical Med      torsemide (DEMADEX) 100 MG tablet Take 1 tablet by mouth in the morning and at bedtime, Disp-30 tablet, R-3Adjust Sig      Sodium Phosphates (FLEET) 7-19 GM/118ML Place 1 enema rectally daily as neededHistorical Med      glucagon 1 MG injection Inject 1 kit into the muscle as needed (for hypoglycemia) Emergencies onlyHistorical Med      loperamide (IMODIUM) 2 MG capsule Take 2 mg by mouth as needed for DiarrheaHistorical Med      magnesium hydroxide (MILK OF MAGNESIA) 400 MG/5ML suspension Take 30 mLs by mouth daily as needed for ConstipationHistorical Med      ondansetron (ZOFRAN-ODT) 4 MG disintegrating tablet Take 4 mg by mouth every 6 hours as needed for NauseaHistorical Med      RHOPRESSA 0.02 % SOLN Place 1 drop into the left eye nightly, DAWHistorical Med      dorzolamide (TRUSOPT) 2 % ophthalmic solution Place 1 drop into the left eye 2 times dailyHistorical Med      brimonidine-timolol (COMBIGAN) 0.2-0.5 % ophthalmic solution Place 1 drop into the left eye 2 times dailyHistorical Med      isosorbide mononitrate (IMDUR) 30 MG extended release tablet Take 1 tablet by mouth daily, Disp-30 tablet, R-1Adjust Sig      carvedilol (COREG) 3.125 MG tablet Take 1 tablet by mouth 2 times daily (with meals), Disp-60 tablet, R-3Normal      linaclotide (LINZESS) 290 MCG CAPS capsule Take 1 capsule by mouth every morning (before breakfast), Disp-30 capsule, R-1Normal      tamsulosin (FLOMAX) 0.4 MG capsule Take 0.8 mg by mouth DailyHistorical Med      atorvastatin (LIPITOR) 80 MG tablet Take 1 tablet by mouth nightly, Disp-30 tablet, R-3Normal      allopurinol (ZYLOPRIM) 300 MG tablet Take 1 tablet by mouth daily, Disp-30 tablet, R-0NO PRINT      naloxone 4 MG/0.1ML LIQD nasal spray 1 spray by Nasal route as needed for Opioid Reversal, Disp-1 each, R-0Normal      senna (SENOKOT) 8.6 MG TABS tablet Take 1 tablet by mouth 2 times daily, Disp-120 tablet, R-0Print      docusate sodium (COLACE, DULCOLAX) 100 MG CAPS Take 100 mg by mouth 2 times dailyOTC      silver sulfADIAZINE (SILVADENE) 1 % cream Apply to BL lower leg ulcers daily, Disp-20 g, R-0, Print      fluticasone (FLONASE) 50 MCG/ACT nasal spray 1 spray by Each Nostril route daily as needed for AllergiesHistorical Med      ferrous sulfate 325 (65 FE) MG tablet Take 325 mg by mouth 2 times daily Historical Med      omeprazole (PRILOSEC) 20 MG delayed release capsule Take 20 mg by mouth 2 times daily. Historical Med             Time Spent on discharge is more than 30 minutes in the examination, evaluation, counseling and review of medications and discharge plan. Signed:    Claudia Temple MD   10/25/2022      Thank you Augusto Auguste MD for the opportunity to be involved in this patient's care. If you have any questions or concerns, please feel free to contact me at [phone].

## 2022-10-28 ASSESSMENT — ENCOUNTER SYMPTOMS
BACK PAIN: 0
COUGH: 0
SORE THROAT: 0
EYE PAIN: 0
VOMITING: 0
SHORTNESS OF BREATH: 0
CONSTIPATION: 0
NAUSEA: 0
RHINORRHEA: 0
ABDOMINAL PAIN: 0
DIARRHEA: 0

## 2022-10-28 NOTE — ED PROVIDER NOTES
EMERGENCY DEPARTMENT ENCOUNTER        Pt Name: Lesa Kawasaki  MRN: [id number]  [place] 1955  Date of evaluation: 10/21/2022  Provider: Casi Batista PA-C  PCP: Estelle Mendoza MD  Note Started: 12:29 PM EDT       I have seen and evaluated this patient with my supervising physician Mo Walsh      Triage Georjean Simple       Chief Complaint   Patient presents with    Abnormal Lab     Pt brought in by EMS from [facility] for abnormal labs. Labs showed elevated creatinine and BUN, decreased sodium         HISTORY OF PRESENT ILLNESS   (Location/Symptom, Timing/Onset, Context/Setting, Quality, Duration, Modifying Factors, Severity)  Note limiting factors. Chief Complaint: Abnormal lab    Lesa Kawasaki is a 75 y.o. male who presents to the emergency department via EMS. He presents from the nursing home, labs recently were done, and it showed elevated BUN and creatinine, his nephrologist was called and they recommended that he come to the emergency department for evaluation. He also noted that his sodium was decreased. The patient states that he was recently admitted, and then discharged, states that he was supposed to see cardiology and nephrology however the [facility] home has been unable to get him to these appointments. He has continued to take the same amount of diuretic since he left the hospital, and states that he thinks that he has taken too much of the diuretic and now he is dehydrated when normally he is here for CHF exacerbations. Nursing Notes were all reviewed and agreed with or any disagreements were addressed in the HPI. REVIEW OF SYSTEMS    (2-9 systems for level 4, 10 or more for level 5)     Review of Systems   Constitutional:  Negative for chills, diaphoresis and fever. HENT:  Negative for congestion, ear pain, rhinorrhea and sore throat. Eyes:  Negative for pain and visual disturbance. Respiratory:  Negative for cough and shortness of breath. Cardiovascular:  Negative for chest pain, palpitations and leg swelling. Gastrointestinal:  Negative for abdominal pain, constipation, diarrhea, nausea and vomiting. Genitourinary:  Negative for decreased urine volume, dysuria, frequency and urgency. Musculoskeletal:  Negative for back pain and neck pain. Skin:  Negative for rash and wound. Neurological:  Negative for dizziness and light-headedness.      PAST MEDICAL HISTORY     Past Medical History:   Diagnosis Date    Anemia     Angina     Arthritis     CAD (coronary artery disease)     CHF (congestive heart failure) ([facility])     CKD (chronic kidney disease) stage 3, GFR 30-59 ml/min ([facility])     Clostridium difficile diarrhea 3/16/12; 2/29/12    positive stool toxin    Diabetes mellitus ([address]     Diabetic neuropathy ([address]     feet and legs    Disease of blood and blood forming organ     Dizziness     When he moves his head/ loses his balance    GERD (gastroesophageal reflux disease)     gastric ulcer    Headache     Hearing loss     Hyperlipidemia     Hypertension     Kidney stone 2002    Refusal of blood transfusions as patient is [employer]     Sleep apnea     Tinnitus     Venous ulcer ([address]     LLE    Wound, open 1/13/2012       SURGICAL HISTORY     Past Surgical History:   Procedure Laterality Date    CARDIAC SURGERY      Dec 27 2010, triple bypass    CHOLECYSTECTOMY  9/2011    HERNIA REPAIR  age1    KNEE ARTHROCENTESIS Right 10/6/2022    RIGHT SHOULDER INTRA ARTICULAR INJECTION SITE CONFIRMED BY FLUOROSCOPY performed by Jennifer Bauer MD at [facility] OR    OTHER SURGICAL HISTORY  11-16-11 REPAIR LOWER STERNAL INCISION, REMOVAL OF ONE STERNAL WIRE,    OTHER SURGICAL HISTORY  10/10/2014    phacoemulsification of cataract with intraocular lens implant left eye    PAIN MANAGEMENT PROCEDURE N/A 2/10/2022    MIDLINE CERVICAL SIX SEVEN EPIDURAL STEROID INJECTION SITE CONFIRMED BY FLUOROSCOPY performed by Jennifer Bauer MD at [address] [place] [address] MANAGEMENT PROCEDURE N/A 7/21/2022    MIDLINE TO RIGHT CERVICAL SIX SEVEN EPIDURAL STEROID INJECTION SITE CONFIRMED BY FLUOROSCOPY performed by Nichole Fuentes MD at [address]       Discharge Medication List as of 10/25/2022  1:00 PM        CONTINUE these medications which have NOT CHANGED    Details   acetaminophen (TYLENOL) 325 MG tablet Take 650 mg by mouth every 4 hours as needed for PainHistorical Med      cyclobenzaprine (FLEXERIL) 5 MG tablet Take 5 mg by mouth every 8 hours as needed for Muscle spasmsHistorical Med      insulin glargine (LANTUS SOLOSTAR) 100 UNIT/ML injection pen Inject 40 Units into the skin dailyHistorical Med      insulin lispro, 1 Unit Dial, (HUMALOG/ADMELOG) 100 UNIT/ML SOPN Inject into the skin 3 times daily Per sliding scaleHistorical Med      meclizine (ANTIVERT) 25 MG tablet Take 25 mg by mouth every 8 hours as needed for DizzinessHistorical Med      pregabalin (LYRICA) 25 MG capsule Take 25 mg by mouth every 12 hours as needed (pain). Historical Med      polyethyl glycol-propyl glycol 0.4-0.3 % (SYSTANE ULTRA) 0.4-0.3 % ophthalmic solution Place 1 drop into both eyes 4 times daily as needed for Dry EyesHistorical Med      polyethylene glycol (GLYCOLAX) 17 GM/SCOOP powder Take 17 g by mouth daily as needed (constipation)Historical Med      magnesium oxide (MAG-OX) 400 (240 Mg) MG tablet Take 1 tablet by mouth 2 times daily, Disp-30 tablet, R-0Normal      spironolactone (ALDACTONE) 25 MG tablet Take 1 tablet by mouth daily, Disp-30 tablet, R-3Normal      metOLazone (ZAROXOLYN) 5 MG tablet 1 tab three times a week (Monday, Wednesday, and fridays), Disp-24 tablet, R-0Normal      potassium chloride (KLOR-CON M) 20 MEQ extended release tablet Take 20 mEq by mouth dailyHistorical Med      torsemide (DEMADEX) 100 MG tablet Take 1 tablet by mouth in the morning and at bedtime, Disp-30 tablet, R-3Adjust Sig      Sodium Phosphates (FLEET) 7-19 GM/118ML Place 1 enema rectally daily as neededHistorical Med      glucagon 1 MG injection Inject 1 kit into the muscle as needed (for hypoglycemia) Emergencies onlyHistorical Med      loperamide (IMODIUM) 2 MG capsule Take 2 mg by mouth as needed for DiarrheaHistorical Med      magnesium hydroxide (MILK OF MAGNESIA) 400 MG/5ML suspension Take 30 mLs by mouth daily as needed for ConstipationHistorical Med      ondansetron (ZOFRAN-ODT) 4 MG disintegrating tablet Take 4 mg by mouth every 6 hours as needed for NauseaHistorical Med      RHOPRESSA 0.02 % SOLN Place 1 drop into the left eye nightly, DAWHistorical Med      dorzolamide (TRUSOPT) 2 % ophthalmic solution Place 1 drop into the left eye 2 times dailyHistorical Med      brimonidine-timolol (COMBIGAN) 0.2-0.5 % ophthalmic solution Place 1 drop into the left eye 2 times dailyHistorical Med      isosorbide mononitrate (IMDUR) 30 MG extended release tablet Take 1 tablet by mouth daily, Disp-30 tablet, R-1Adjust Sig      carvedilol (COREG) 3.125 MG tablet Take 1 tablet by mouth 2 times daily (with meals), Disp-60 tablet, R-3Normal      linaclotide (LINZESS) 290 MCG CAPS capsule Take 1 capsule by mouth every morning (before breakfast), Disp-30 capsule, R-1Normal      tamsulosin (FLOMAX) 0.4 MG capsule Take 0.8 mg by mouth DailyHistorical Med      atorvastatin (LIPITOR) 80 MG tablet Take 1 tablet by mouth nightly, Disp-30 tablet, R-3Normal      allopurinol (ZYLOPRIM) 300 MG tablet Take 1 tablet by mouth daily, Disp-30 tablet, R-0NO PRINT      naloxone 4 MG/0.1ML LIQD nasal spray 1 spray by Nasal route as needed for Opioid Reversal, Disp-1 each, R-0Normal      senna (SENOKOT) 8.6 MG TABS tablet Take 1 tablet by mouth 2 times daily, Disp-120 tablet, R-0Print      docusate sodium (COLACE, DULCOLAX) 100 MG CAPS Take 100 mg by mouth 2 times dailyOTC      silver sulfADIAZINE (SILVADENE) 1 % cream Apply to BL lower leg ulcers daily, Disp-20 g, R-0, Print      fluticasone (FLONASE) 50 MCG/ACT nasal spray 1 spray by Each Nostril route daily as needed for AllergiesHistorical Med      ferrous sulfate 325 (65 FE) MG tablet Take 325 mg by mouth 2 times daily Historical Med      omeprazole (PRILOSEC) 20 MG delayed release capsule Take 20 mg by mouth 2 times daily. Historical Med             ALLERGIES     Amitriptyline, Celecoxib, Cymbalta [duloxetine hcl], Elavil [amitriptyline hcl], Gabapentin, and Nsaids    FAMILYHISTORY       Family History   Problem Relation Age of Onset    Heart Disease Mother     Heart Disease Father     Cancer Father     Diabetes Brother     Diabetes Brother         SOCIAL HISTORY       Social History     Socioeconomic History    Marital status: Single     Spouse name: None    Number of children: None    Years of education: None    Highest education level: None   Tobacco Use    Smoking status: Former     Packs/day: 1.00     Years: 16.00     Pack years: 16.00     Types: Cigarettes     Quit date: 1/10/1988     Years since quittin.8    Smokeless tobacco: Never    Tobacco comments:     22 yrs ago   Vaping Use    Vaping Use: Never used   Substance and Sexual Activity    Alcohol use: No     Alcohol/week: 0.0 standard drinks    Drug use: No    Sexual activity: Not Currently       SCREENINGS    Jigar Coma Scale  Eye Opening: Spontaneous  Best Verbal Response: Oriented  Best Motor Response: Obeys commands  Jigar Coma Scale Score: 15        PHYSICAL EXAM    (up to 7 for level 4, 8 or more for level 5)     ED Triage Vitals [10/21/22 1141]   BP Temp Temp Source Heart Rate Resp SpO2 Height Weight   109/63 97.8 °F (36.6 °C) Oral 74 18 99 % 5' 9\" (1.753 m) 232 lb (105.2 kg)       Physical Exam  Vitals and nursing note reviewed. Constitutional:       Appearance: Normal appearance. He is well-developed. He is not ill-appearing or diaphoretic. HENT:      Head: Normocephalic and atraumatic.       Right Ear: External ear normal.      Left Ear: External ear normal.      Nose: Nose normal.   Eyes:      General:         Right eye: No discharge. Left eye: No discharge. Cardiovascular:      Rate and Rhythm: Normal rate and regular rhythm. Heart sounds: Normal heart sounds. No murmur heard. No friction rub. No gallop. Pulmonary:      Effort: Pulmonary effort is normal. No respiratory distress. Breath sounds: Normal breath sounds. No stridor. No wheezing or rales. Chest:      Chest wall: No tenderness. Abdominal:      General: Bowel sounds are normal. There is no distension. Palpations: Abdomen is soft. There is no mass. Tenderness: There is no abdominal tenderness. There is no guarding or rebound. Musculoskeletal:         General: Normal range of motion. Cervical back: Normal range of motion and neck supple. Skin:     General: Skin is warm and dry. Coloration: Skin is not pale. Neurological:      General: No focal deficit present. Mental Status: He is alert.    Psychiatric:         Mood and Affect: Mood normal.         Behavior: Behavior normal.       DIAGNOSTIC RESULTS   LABS:    Labs Reviewed   CBC WITH AUTO DIFFERENTIAL - Abnormal; Notable for the following components:       Result Value    Hemoglobin 11.9 (*)     Hematocrit 35.5 (*)     RDW 18.8 (*)     All other components within normal limits   COMPREHENSIVE METABOLIC PANEL W/ REFLEX TO MG FOR LOW K - Abnormal; Notable for the following components:    Sodium 121 (*)     Chloride 77 (*)     CO2 35 (*)     Glucose 389 (*)      (*)     Creatinine 1.9 (*)     Est, Glom Filt Rate 38 (*)     Albumin/Globulin Ratio 1.0 (*)     All other components within normal limits    Narrative:     CALL  Jasso  [facility] tel. [phone],  Chemistry results called to and read back by SIGRID Miller, 10/21/2022  13:16, by Zachary Mejias   URINALYSIS WITH REFLEX TO CULTURE - Abnormal; Notable for the following components:    Glucose, Ur 250 (*)     All other components within normal limits   MAGNESIUM - Abnormal; Notable for the following components:    Magnesium 2.80 (*)     All other components within normal limits   CBC WITH AUTO DIFFERENTIAL - Abnormal; Notable for the following components:    Hemoglobin 12.0 (*)     Hematocrit 36.4 (*)     RDW 18.4 (*)     Lymphocytes Absolute 0.9 (*)     All other components within normal limits   BASIC METABOLIC PANEL - Abnormal; Notable for the following components:    Sodium 125 (*)     Chloride 82 (*)     Glucose 446 (*)      (*)     Creatinine 1.7 (*)     Est, Glom Filt Rate 43 (*)     All other components within normal limits    Narrative:     CALL  [place] [address] tel. [phone],  Chemistry results called to and read back by Hilario Torres RN, 10/21/2022  22:00, by Ernie - Abnormal; Notable for the following components:    Sodium 124 (*)     Chloride 82 (*)     Glucose 420 (*)      (*)     Creatinine 1.7 (*)     Est, Glom Filt Rate 43 (*)     All other components within normal limits    Narrative:     CALL  [place] [address] tel. [phone],  Previous panic on this admission - call not needed per SOP, 10/22/2022 02:01,  by Ernie - Abnormal; Notable for the following components:    Sodium 129 (*)     Chloride 84 (*)     Glucose 346 (*)      (*)     Creatinine 1.6 (*)     Est, Glom Filt Rate 46 (*)     All other components within normal limits    Narrative:     CALL  [place] [address] tel. [phone],  Previous panic on this admission - call not needed per SOP, 10/22/2022 08:49,  by [address] [place], [place] [address] - Abnormal; Notable for the following components:    Sodium 128 (*)     Chloride 84 (*)     CO2 35 (*)     Glucose 290 (*)      (*)     Creatinine 1.7 (*)     Est, Glom Filt Rate 43 (*)     All other components within normal limits    Narrative:     CALL  Jasso  [facility] tel. [phone],  Previous panic on this admission - call not needed per SOP, 10/22/2022 14:06,  by [address] [place] [address] METABOLIC PANEL - Abnormal; Notable for the following components:    Sodium 126 (*)     Chloride 84 (*)     CO2 34 (*)     Glucose 340 (*)      (*)     Creatinine 1.8 (*)     Est, Glom Filt Rate 40 (*)     All other components within normal limits    Narrative:     CALL  [place] [address] tel. [phone],  Previous panic on this admission - call not needed per SOP, 10/22/2022 19:51,  by Joselyn [place] - Abnormal; Notable for the following components:    Sodium 128 (*)     Chloride 87 (*)     Glucose 326 (*)      (*)     Creatinine 1.6 (*)     Est, Glom Filt Rate 46 (*)     All other components within normal limits    Narrative:     CALL  Jasso  [facility] tel. [phone],  Previous panic on this admission - call not needed per SOP, 10/23/2022 02:06,  by [address] [place] [address] - Abnormal; Notable for the following components:    Sodium 132 (*)     Chloride 89 (*)     CO2 34 (*)     Glucose 313 (*)      (*)     Creatinine 1.5 (*)     Est, Glom Filt Rate 50 (*)     All other components within normal limits    Narrative:     [place] [address] tel. [phone],  Previous panic on this admission - call not needed per SOP, 10/23/2022 08:53,  by NAVID   MAGNESIUM - Abnormal; Notable for the following components:    Magnesium 2.60 (*)     All other components within normal limits    Narrative:     [place] [address] tel. [phone],  Previous panic on this admission - call not needed per SOP, 10/23/2022 08:53,  by [address] - Abnormal; Notable for the following components:    Sodium 129 (*)     Chloride 88 (*)     Glucose 307 (*)      (*)     Creatinine 1.5 (*)     Est, Glom Filt Rate 50 (*)     All other components within normal limits    Narrative:     [place] [address] tel. [phone],  Previous panic on this admission - call not needed per SOP, 10/23/2022 14:03,  by [address] - Abnormal; Notable for the following components:    Sodium 129 (*) Chloride 91 (*)     Glucose 450 (*)     BUN 89 (*)     Creatinine 1.5 (*)     Est, Glom Filt Rate 50 (*)     All other components within normal limits    Narrative:     CALL  Jasso  [facility] tel. [phone],  Previous panic on this admission - call not needed per SOP, 10/23/2022 19:54,  by [address] - Abnormal; Notable for the following components:    Sodium 129 (*)     Chloride 91 (*)     Glucose 372 (*)     BUN 82 (*)     Creatinine 1.5 (*)     Est, Glom Filt Rate 50 (*)     All other components within normal limits    Narrative:     CALL  [place]asso  [facility] tel. [phone],  Chemistry results called to and read back by Lisa Miguel RN, 10/24/2022  01:06, by Ernie - Abnormal; Notable for the following components:    Sodium 130 (*)     Chloride 92 (*)     Glucose 276 (*)     BUN 76 (*)     Est, Glom Filt Rate 60 (*)     All other components within normal limits    Narrative:     Collection has been rescheduled by [facility] at 10/24/2022 07:51 Reason:   Failed attempt at venipuncture  Collection has been rescheduled by [facility] at 10/24/2022 07:51 Reason:   Failed attempt at venipuncture   [address] - Abnormal; Notable for the following components:    Pro-BNP 2,317 (*)     All other components within normal limits    Narrative:     Collection has been rescheduled by [facility] at 10/24/2022 07:51 Reason:   Failed attempt at venipuncture  Collection has been rescheduled by [facility] at 10/24/2022 07:51 Reason:   Failed attempt at venipuncture   BASIC METABOLIC PANEL - Abnormal; Notable for the following components:    Sodium 132 (*)     Chloride 90 (*)     Glucose 249 (*)     BUN 72 (*)     Creatinine 1.4 (*)     Est, Glom Filt Rate 55 (*)     All other components within normal limits   POCT GLUCOSE - Abnormal; Notable for the following components:    POC Glucose 463 (*)     All other components within normal limits   POCT GLUCOSE - Abnormal; Notable for the following components: POC Glucose 469 (*)     All other components within normal limits   POCT GLUCOSE - Abnormal; Notable for the following components:    POC Glucose 504 (*)     All other components within normal limits   POCT GLUCOSE - Abnormal; Notable for the following components:    POC Glucose 478 (*)     All other components within normal limits   POCT GLUCOSE - Abnormal; Notable for the following components:    POC Glucose 436 (*)     All other components within normal limits   POCT GLUCOSE - Abnormal; Notable for the following components:    POC Glucose 393 (*)     All other components within normal limits   POCT GLUCOSE - Abnormal; Notable for the following components:    POC Glucose 335 (*)     All other components within normal limits   POCT GLUCOSE - Abnormal; Notable for the following components:    POC Glucose 295 (*)     All other components within normal limits   POCT GLUCOSE - Abnormal; Notable for the following components:    POC Glucose 306 (*)     All other components within normal limits   POCT GLUCOSE - Abnormal; Notable for the following components:    POC Glucose 345 (*)     All other components within normal limits   POCT GLUCOSE - Abnormal; Notable for the following components:    POC Glucose 290 (*)     All other components within normal limits   POCT GLUCOSE - Abnormal; Notable for the following components:    POC Glucose 288 (*)     All other components within normal limits   POCT GLUCOSE - Abnormal; Notable for the following components:    POC Glucose 336 (*)     All other components within normal limits   POCT GLUCOSE - Abnormal; Notable for the following components:    POC Glucose 444 (*)     All other components within normal limits   POCT GLUCOSE - Abnormal; Notable for the following components:    POC Glucose 393 (*)     All other components within normal limits   POCT GLUCOSE - Abnormal; Notable for the following components:    POC Glucose 303 (*)     All other components within normal limits   POCT GLUCOSE - Abnormal; Notable for the following components:    POC Glucose 239 (*)     All other components within normal limits   POCT GLUCOSE - Abnormal; Notable for the following components:    POC Glucose 266 (*)     All other components within normal limits   POCT GLUCOSE - Abnormal; Notable for the following components:    POC Glucose 193 (*)     All other components within normal limits   POCT GLUCOSE - Abnormal; Notable for the following components:    POC Glucose 248 (*)     All other components within normal limits   POCT GLUCOSE - Abnormal; Notable for the following components:    POC Glucose 283 (*)     All other components within normal limits   MAGNESIUM    Narrative:     Collection has been rescheduled by [facility] at 10/24/2022 07:51 Reason:   Failed attempt at venipuncture  Collection has been rescheduled by [facility] at 10/24/2022 07:51 Reason:   Failed attempt at venipuncture   MAGNESIUM   POCT GLUCOSE   POCT GLUCOSE   POCT GLUCOSE   POCT GLUCOSE   POCT GLUCOSE   POCT GLUCOSE   POCT GLUCOSE   POCT GLUCOSE   POCT GLUCOSE   POCT GLUCOSE   POCT GLUCOSE   POCT GLUCOSE   POCT GLUCOSE   POCT GLUCOSE   POCT GLUCOSE       When ordered, only abnormal lab results are displayed. All other labs were within normal range or not returned as of this dictation. EKG: When ordered, EKG's are interpreted by the Emergency Department Physician in the absence of a cardiologist.  Please see their note for interpretation of EKG. RADIOLOGY:   Non-plain film images such as CT, Ultrasound and MRI are read by the radiologist. Plain radiographic images are visualized andpreliminarily interpreted by the  ED Provider with the below findings:        Interpretation perthe Radiologist below, if available at the time of this note:    XR CHEST PORTABLE   Final Result   Cardiomegaly with pulmonary vascular congestion. No evidence of overt edema           No results found.       PROCEDURES   Unless otherwise noted below, none Procedures    CRITICAL CARE TIME   N/A    CONSULTS:  IP CONSULT TO NEPHROLOGY  IP CONSULT TO NEPHROLOGY  IP CONSULT TO PHARMACY      EMERGENCY DEPARTMENT COURSE and DIFFERENTIAL DIAGNOSIS/MDM:   Vitals:    Vitals:    10/24/22 2315 10/25/22 0737 10/25/22 0830 10/25/22 1146   BP: (!) 111/54  118/68 (!) 100/55   Pulse: 79  72 96   Resp: 17  16 16   Temp: 97.4 °F (36.3 °C)  97.8 °F (36.6 °C) 98.1 °F (36.7 °C)   TempSrc: Oral  Oral Oral   SpO2: 99%   (!) 89%   Weight:  221 lb 14.4 oz (100.7 kg)     Height:           Patient was given thefollowing medications:  Medications   insulin regular (HUMULIN R;NOVOLIN R) injection 10 Units (10 Units IntraVENous Given 10/22/22 0005)   traMADol (ULTRAM) tablet 50 mg (50 mg Oral Given by Other 10/24/22 0600)         Is this patient to be included in the SEP-1 Core Measure due to severe sepsis or septic shock? No   Exclusion criteria - the patient is NOT to be included for SEP-1 Core Measure due to: Infection is not suspected    This patient has what appears to be acute on chronic renal failure. I discussed his care with the hospitalist and with the nephrology group. He will be admitted to the hospital for gentle rehydration, and electrolyte maintenance. No evidence of GI bleed at this time, no evidence of sepsis. I am the Primary Clinician of Record. FINAL IMPRESSION      1. Acute renal failure superimposed on chronic kidney disease, unspecified CKD stage, unspecified acute renal failure type ([address]    2.  Chronic pain in right shoulder          DISPOSITION/PLAN   DISPOSITION Admitted 10/21/2022 04:06:58 PM      PATIENT REFERREDTO:  [facility] SYSHERRELL VARELA  [facility] [address], [facility] [address]  [phone]    Follow up in 1 week(s)        DISCHARGE MEDICATIONS:  Discharge Medication List as of 10/25/2022  1:00 PM        START taking these medications    Details   hydrOXYzine HCl (ATARAX) 10 MG tablet Take 1 tablet by mouth every 6 hours as needed for Anxiety, Disp-28 tablet, R-0NO PRINT             DISCONTINUED MEDICATIONS:  Discharge Medication List as of 10/25/2022  1:00 PM        STOP taking these medications       nitroGLYCERIN (NITROSTAT) 0.4 MG SL tablet Comments:   Reason for Stopping:                      (Please note that portions ofthis note were completed with a voice recognition program.  Efforts were made to edit the dictations but occasionally words are mis-transcribed.)    Elli Clarke PA-C (electronically signed)             Elli Clarke PA-C  10/28/22 0950

## 2022-10-29 ENCOUNTER — APPOINTMENT (OUTPATIENT)
Dept: CT IMAGING | Age: 67
End: 2022-10-29
Payer: COMMERCIAL

## 2022-10-29 ENCOUNTER — TELEPHONE (OUTPATIENT)
Dept: OTHER | Facility: CLINIC | Age: 67
End: 2022-10-29

## 2022-10-29 ENCOUNTER — HOSPITAL ENCOUNTER (EMERGENCY)
Age: 67
Discharge: HOME | End: 2022-10-29
Attending: EMERGENCY MEDICINE
Payer: COMMERCIAL

## 2022-10-29 ENCOUNTER — APPOINTMENT (OUTPATIENT)
Dept: GENERAL RADIOLOGY | Age: 67
End: 2022-10-29
Payer: COMMERCIAL

## 2022-10-29 VITALS
BODY MASS INDEX: 32.85 KG/M2 | WEIGHT: 221.8 LBS | RESPIRATION RATE: 21 BRPM | HEART RATE: 88 BPM | DIASTOLIC BLOOD PRESSURE: 74 MMHG | SYSTOLIC BLOOD PRESSURE: 110 MMHG | OXYGEN SATURATION: 96 % | TEMPERATURE: 98.6 F | HEIGHT: 69 IN

## 2022-10-29 DIAGNOSIS — N18.9 ACUTE RENAL FAILURE SUPERIMPOSED ON CHRONIC KIDNEY DISEASE, UNSPECIFIED CKD STAGE, UNSPECIFIED ACUTE RENAL FAILURE TYPE (HCC): Primary | ICD-10-CM

## 2022-10-29 DIAGNOSIS — N17.9 ACUTE RENAL FAILURE SUPERIMPOSED ON CHRONIC KIDNEY DISEASE, UNSPECIFIED CKD STAGE, UNSPECIFIED ACUTE RENAL FAILURE TYPE (HCC): Primary | ICD-10-CM

## 2022-10-29 DIAGNOSIS — R77.8 ELEVATED TROPONIN: ICD-10-CM

## 2022-10-29 LAB
A/G RATIO: 1 (ref 1.1–2.2)
ALBUMIN SERPL-MCNC: 3.9 G/DL (ref 3.4–5)
ALP BLD-CCNC: 103 U/L (ref 40–129)
ALT SERPL-CCNC: 19 U/L (ref 10–40)
ANION GAP SERPL CALCULATED.3IONS-SCNC: 14 MMOL/L (ref 3–16)
AST SERPL-CCNC: 28 U/L (ref 15–37)
BASOPHILS ABSOLUTE: 0 K/UL (ref 0–0.2)
BASOPHILS RELATIVE PERCENT: 0.7 %
BILIRUB SERPL-MCNC: 0.8 MG/DL (ref 0–1)
BILIRUBIN URINE: NEGATIVE
BLOOD, URINE: NEGATIVE
BUN BLDV-MCNC: 83 MG/DL (ref 7–20)
CALCIUM SERPL-MCNC: 9.8 MG/DL (ref 8.3–10.6)
CHLORIDE BLD-SCNC: 89 MMOL/L (ref 99–110)
CLARITY: CLEAR
CO2: 29 MMOL/L (ref 21–32)
COLOR: YELLOW
CREAT SERPL-MCNC: 1.5 MG/DL (ref 0.8–1.3)
EKG ATRIAL RATE: 55 BPM
EKG DIAGNOSIS: NORMAL
EKG Q-T INTERVAL: 370 MS
EKG QRS DURATION: 88 MS
EKG QTC CALCULATION (BAZETT): 432 MS
EKG R AXIS: -44 DEGREES
EKG T AXIS: 125 DEGREES
EKG VENTRICULAR RATE: 82 BPM
EOSINOPHILS ABSOLUTE: 0.2 K/UL (ref 0–0.6)
EOSINOPHILS RELATIVE PERCENT: 2.3 %
GFR SERPL CREATININE-BSD FRML MDRD: 50 ML/MIN/{1.73_M2}
GLUCOSE BLD-MCNC: 264 MG/DL (ref 70–99)
GLUCOSE URINE: NEGATIVE MG/DL
HCT VFR BLD CALC: 34.5 % (ref 40.5–52.5)
HEMOGLOBIN: 11.4 G/DL (ref 13.5–17.5)
KETONES, URINE: NEGATIVE MG/DL
LEUKOCYTE ESTERASE, URINE: NEGATIVE
LYMPHOCYTES ABSOLUTE: 1 K/UL (ref 1–5.1)
LYMPHOCYTES RELATIVE PERCENT: 13.5 %
MAGNESIUM: 1.5 MG/DL (ref 1.8–2.4)
MCH RBC QN AUTO: 27.9 PG (ref 26–34)
MCHC RBC AUTO-ENTMCNC: 33.1 G/DL (ref 31–36)
MCV RBC AUTO: 84.1 FL (ref 80–100)
MICROSCOPIC EXAMINATION: NORMAL
MONOCYTES ABSOLUTE: 0.5 K/UL (ref 0–1.3)
MONOCYTES RELATIVE PERCENT: 7.2 %
NEUTROPHILS ABSOLUTE: 5.4 K/UL (ref 1.7–7.7)
NEUTROPHILS RELATIVE PERCENT: 76.3 %
NITRITE, URINE: NEGATIVE
PDW BLD-RTO: 19.6 % (ref 12.4–15.4)
PH UA: 6.5 (ref 5–8)
PLATELET # BLD: 198 K/UL (ref 135–450)
PMV BLD AUTO: 8.7 FL (ref 5–10.5)
POTASSIUM REFLEX MAGNESIUM: 3.3 MMOL/L (ref 3.5–5.1)
PRO-BNP: 2082 PG/ML (ref 0–124)
PROTEIN UA: NEGATIVE MG/DL
RBC # BLD: 4.1 M/UL (ref 4.2–5.9)
SODIUM BLD-SCNC: 132 MMOL/L (ref 136–145)
SPECIFIC GRAVITY UA: 1.01 (ref 1–1.03)
TOTAL PROTEIN: 7.8 G/DL (ref 6.4–8.2)
TROPONIN: 0.12 NG/ML
TROPONIN: 0.14 NG/ML
URINE REFLEX TO CULTURE: NORMAL
URINE TYPE: NORMAL
UROBILINOGEN, URINE: 0.2 E.U./DL
WBC # BLD: 7.1 K/UL (ref 4–11)

## 2022-10-29 PROCEDURE — 99285 EMERGENCY DEPT VISIT HI MDM: CPT

## 2022-10-29 PROCEDURE — 93005 ELECTROCARDIOGRAM TRACING: CPT | Performed by: EMERGENCY MEDICINE

## 2022-10-29 PROCEDURE — 96365 THER/PROPH/DIAG IV INF INIT: CPT

## 2022-10-29 PROCEDURE — 83880 ASSAY OF NATRIURETIC PEPTIDE: CPT

## 2022-10-29 PROCEDURE — 74176 CT ABD & PELVIS W/O CONTRAST: CPT

## 2022-10-29 PROCEDURE — 80053 COMPREHEN METABOLIC PANEL: CPT

## 2022-10-29 PROCEDURE — 85025 COMPLETE CBC W/AUTO DIFF WBC: CPT

## 2022-10-29 PROCEDURE — 83735 ASSAY OF MAGNESIUM: CPT

## 2022-10-29 PROCEDURE — 93005 ELECTROCARDIOGRAM TRACING: CPT | Performed by: PHYSICIAN ASSISTANT

## 2022-10-29 PROCEDURE — 84484 ASSAY OF TROPONIN QUANT: CPT

## 2022-10-29 PROCEDURE — 70450 CT HEAD/BRAIN W/O DYE: CPT

## 2022-10-29 PROCEDURE — 81003 URINALYSIS AUTO W/O SCOPE: CPT

## 2022-10-29 PROCEDURE — 71045 X-RAY EXAM CHEST 1 VIEW: CPT

## 2022-10-29 PROCEDURE — 6360000002 HC RX W HCPCS: Performed by: EMERGENCY MEDICINE

## 2022-10-29 PROCEDURE — 2580000003 HC RX 258: Performed by: PHYSICIAN ASSISTANT

## 2022-10-29 PROCEDURE — 96366 THER/PROPH/DIAG IV INF ADDON: CPT

## 2022-10-29 PROCEDURE — 93010 ELECTROCARDIOGRAM REPORT: CPT | Performed by: INTERNAL MEDICINE

## 2022-10-29 PROCEDURE — 6370000000 HC RX 637 (ALT 250 FOR IP): Performed by: EMERGENCY MEDICINE

## 2022-10-29 PROCEDURE — 6370000000 HC RX 637 (ALT 250 FOR IP): Performed by: PHYSICIAN ASSISTANT

## 2022-10-29 RX ORDER — 0.9 % SODIUM CHLORIDE 0.9 %
50 INTRAVENOUS SOLUTION INTRAVENOUS ONCE
Status: COMPLETED | OUTPATIENT
Start: 2022-10-29 | End: 2022-10-29

## 2022-10-29 RX ORDER — POTASSIUM CHLORIDE 20 MEQ/1
40 TABLET, EXTENDED RELEASE ORAL ONCE
Status: COMPLETED | OUTPATIENT
Start: 2022-10-29 | End: 2022-10-29

## 2022-10-29 RX ORDER — MAGNESIUM SULFATE 1 G/100ML
1000 INJECTION INTRAVENOUS ONCE
Status: COMPLETED | OUTPATIENT
Start: 2022-10-29 | End: 2022-10-29

## 2022-10-29 RX ORDER — LIDOCAINE 4 G/G
1 PATCH TOPICAL ONCE
Status: DISCONTINUED | OUTPATIENT
Start: 2022-10-29 | End: 2022-10-29 | Stop reason: HOSPADM

## 2022-10-29 RX ADMIN — POTASSIUM CHLORIDE 40 MEQ: 1500 TABLET, EXTENDED RELEASE ORAL at 15:08

## 2022-10-29 RX ADMIN — SODIUM CHLORIDE 50 ML: 9 INJECTION, SOLUTION INTRAVENOUS at 12:39

## 2022-10-29 RX ADMIN — MAGNESIUM SULFATE HEPTAHYDRATE 1000 MG: 1 INJECTION, SOLUTION INTRAVENOUS at 15:07

## 2022-10-29 ASSESSMENT — ENCOUNTER SYMPTOMS
COLOR CHANGE: 0
COUGH: 0
VOMITING: 0
DIARRHEA: 0
SHORTNESS OF BREATH: 0
NAUSEA: 0
ABDOMINAL PAIN: 0
CHEST TIGHTNESS: 0

## 2022-10-29 ASSESSMENT — PAIN DESCRIPTION - LOCATION: LOCATION: GENERALIZED

## 2022-10-29 ASSESSMENT — PAIN - FUNCTIONAL ASSESSMENT: PAIN_FUNCTIONAL_ASSESSMENT: 0-10

## 2022-10-29 ASSESSMENT — PAIN DESCRIPTION - FREQUENCY: FREQUENCY: CONTINUOUS

## 2022-10-29 ASSESSMENT — LIFESTYLE VARIABLES
HOW OFTEN DO YOU HAVE A DRINK CONTAINING ALCOHOL: NEVER
HOW MANY STANDARD DRINKS CONTAINING ALCOHOL DO YOU HAVE ON A TYPICAL DAY: PATIENT DOES NOT DRINK

## 2022-10-29 ASSESSMENT — PAIN DESCRIPTION - DESCRIPTORS: DESCRIPTORS: ACHING

## 2022-10-29 ASSESSMENT — PAIN SCALES - GENERAL: PAINLEVEL_OUTOF10: 10

## 2022-10-29 ASSESSMENT — PAIN DESCRIPTION - PAIN TYPE: TYPE: CHRONIC PAIN

## 2022-10-29 NOTE — CONSULTS
[facility] Nephrology @0153. Per Burke CASTANO  Re; acute on chronic renal failure  Dr. Angel Mccarty responded @7410.

## 2022-10-29 NOTE — TELEPHONE ENCOUNTER
Writer contacted MIKAL Kyle to inform of 30 day readmission risk. Writer's attempt to contact MIKAL Kyle was unsuccessful.      Call Back: If you need to call back to inform of disposition you can contact me at [phone]

## 2022-10-29 NOTE — DISCHARGE INSTRUCTIONS
Kidney labs today are consistent with your baseline kidney labs. We spoke with  (nephrologist)and also on call cardiologist, no indication for admission today. Nephrology advised you do not need urgent dialysis. It is something that can be set up as an outpatient if indicated. Continue to weigh yourself daily. If your weights go above 225 pounds then contact their office regarding your torsemide dosing. If your weights go under 221 pounds please reach out regarding your diuretic dosing. Otherwise no acute findings at this time.   Follow-up with PCP

## 2022-10-29 NOTE — CONSULTS
Called I @0538  Per Kaitlin CASTANO. Re; elevated troponin, renal failure, ekg changes  Dr. Viola Nazario @7000.

## 2022-10-29 NOTE — ED NOTES
Pt called out stating he is having difficulty urinating and unable to obtain sample. Pt also states that he does not want to have any type of catheterization to obtain sample. MIKAL aware.      Anabelle Abbasi RN  10/29/22 6152

## 2022-10-29 NOTE — ED NOTES
Pt SpO2 86% on RA while sleeping. Placed on 3L NC which pt states he wears at night normally, with SpO2 rising to 93%.      Eileen Servin RN  10/29/22 7237

## 2022-10-29 NOTE — ED NOTES
Report to SNF. Spoke with Joan Lopez, [facility] charge nurse. All questions answered.      Chioma Armando RN  10/29/22 7302

## 2022-10-29 NOTE — CONSULTS
Called [facility] @7381  RE: make sure got EKG does he have an opinion?  Per Kaylah Castillo called back @ 4349

## 2022-10-29 NOTE — ED PROVIDER NOTES
[facility]  ED  EMERGENCY DEPARTMENT ENCOUNTER        Pt Name: Lesa Kawasaki  MRN: [id number]  Arm[place] 1955  Date of evaluation: 10/29/2022  Provider: Katerina Thompson PA-C  PCP: Estelle Mendoza MD  ED Attending: Alysia Andre      This patient was seen and evaluated by the attending physician     I have not independently evaluated this patient. CHIEF COMPLAINT       Chief Complaint   Patient presents with    Other     Pt arrives from [facility] for multiple concerns. Reports increased weakness and confusion over past week. Pt was evaluated and stroke workup negative, d/c back to SNF. SNF did bloodwork yesterday and found elevated renal labs (Creat 1.6, BUN 79, GFR 43); Medics report SNF discussed dialysis with pt, who initially refused. Pt reports tremors have increased over past few days. HISTORY OF PRESENT ILLNESS   (Location/Symptom, Timing/Onset, Context/Setting, Quality, Duration, Modifying Factors, Severity)  Note limiting factors. Lesa Kawasaki is a 75 y.o. male with a pMH of MARGARITO on CKD, hypertension, CAD, obesity and CHF, who presented to [facility] with abnormal lab work, as drawn routinely at skilled nursing facility. Report having creatinine of 1.6 a bun of 79 and GFR 43. Reported to have discussed initiating dialysis with the patient who had refused (patient is a Jehovah witness and will not receive any blood products however once he  Presents from SNF. Patient complains about having chronic headaches, neck pain, worsening of chronic resting tremor. Nursing Notes were all reviewed and agreed with or any disagreements were addressed  in the HPI. REVIEW OF SYSTEMS  (2-9 systems for level 4, 10 or more for level 5)     Review of Systems   Constitutional:  Negative for chills, fatigue and fever. HENT: Negative. Respiratory:  Negative for cough, chest tightness and shortness of breath. Cardiovascular:  Negative for chest pain. Gastrointestinal:  Negative for abdominal pain, diarrhea, nausea and vomiting. Genitourinary: Negative. Musculoskeletal:  Positive for arthralgias and myalgias. Skin:  Negative for color change and rash. Neurological:  Positive for tremors, weakness and headaches. Hematological: Negative. Psychiatric/Behavioral: Negative. All other systems reviewed and are negative. Positivesand Pertinent negatives as per HPI. Except as noted above in the ROS, all other systems were reviewed and negative.        PAST MEDICAL HISTORY     Past Medical History:   Diagnosis Date    Anemia     Angina     Arthritis     CAD (coronary artery disease)     CHF (congestive heart failure) ([facility])     CKD (chronic kidney disease) stage 3, GFR 30-59 ml/min ([facility])     Clostridium difficile diarrhea 3/16/12; 2/29/12    positive stool toxin    Diabetes mellitus ([address]     Diabetic neuropathy ([address]     feet and legs    Disease of blood and blood forming organ     Dizziness     When he moves his head/ loses his balance    GERD (gastroesophageal reflux disease)     gastric ulcer    Headache     Hearing loss     Hyperlipidemia     Hypertension     Kidney stone 2002    Refusal of blood transfusions as patient is Quaker     Sleep apnea     Tinnitus     Venous ulcer ([address]     LLE    Wound, open 1/13/2012         SURGICAL HISTORY       Past Surgical History:   Procedure Laterality Date    CARDIAC SURGERY      Dec 27 2010, triple bypass    CHOLECYSTECTOMY  9/2011    HERNIA REPAIR  age1    KNEE ARTHROCENTESIS Right 10/6/2022    RIGHT SHOULDER INTRA ARTICULAR INJECTION SITE CONFIRMED BY FLUOROSCOPY performed by Sharon James MD at [address] [place] [address]  11-16-11 REPAIR LOWER STERNAL INCISION, REMOVAL OF ONE STERNAL WIRE,    OTHER SURGICAL HISTORY  10/10/2014    phacoemulsification of cataract with intraocular lens implant left eye    PAIN MANAGEMENT PROCEDURE N/A 2/10/2022    MIDLINE CERVICAL SIX SEVEN EPIDURAL STEROID INJECTION SITE CONFIRMED BY FLUOROSCOPY performed by Shayla Alas MD at [facility] OR    PAIN MANAGEMENT PROCEDURE N/A 7/21/2022    MIDLINE TO RIGHT CERVICAL SIX SEVEN EPIDURAL STEROID INJECTION SITE CONFIRMED BY FLUOROSCOPY performed by Shayla Alas MD at [place] [address]       Previous Medications    ACETAMINOPHEN (TYLENOL) 325 MG TABLET    Take 650 mg by mouth every 4 hours as needed for Pain    ALLOPURINOL (ZYLOPRIM) 300 MG TABLET    Take 1 tablet by mouth daily    ATORVASTATIN (LIPITOR) 80 MG TABLET    Take 1 tablet by mouth nightly    BRIMONIDINE-TIMOLOL (COMBIGAN) 0.2-0.5 % OPHTHALMIC SOLUTION    Place 1 drop into the left eye 2 times daily    CARVEDILOL (COREG) 3.125 MG TABLET    Take 1 tablet by mouth 2 times daily (with meals)    CYCLOBENZAPRINE (FLEXERIL) 5 MG TABLET    Take 5 mg by mouth every 8 hours as needed for Muscle spasms    DOCUSATE SODIUM (COLACE, DULCOLAX) 100 MG CAPS    Take 100 mg by mouth 2 times daily    DORZOLAMIDE (TRUSOPT) 2 % OPHTHALMIC SOLUTION    Place 1 drop into the left eye 2 times daily    FERROUS SULFATE 325 (65 FE) MG TABLET    Take 325 mg by mouth 2 times daily     FLUTICASONE (FLONASE) 50 MCG/ACT NASAL SPRAY    1 spray by Each Nostril route daily as needed for Allergies    GLUCAGON 1 MG INJECTION    Inject 1 kit into the muscle as needed (for hypoglycemia) Emergencies only    HYDROXYZINE HCL (ATARAX) 10 MG TABLET    Take 1 tablet by mouth every 6 hours as needed for Anxiety    INSULIN GLARGINE (LANTUS SOLOSTAR) 100 UNIT/ML INJECTION PEN    Inject 40 Units into the skin daily    INSULIN LISPRO, 1 UNIT DIAL, (HUMALOG/ADMELOG) 100 UNIT/ML SOPN    Inject into the skin 3 times daily Per sliding scale    ISOSORBIDE MONONITRATE (IMDUR) 30 MG EXTENDED RELEASE TABLET    Take 1 tablet by mouth daily    LINACLOTIDE (LINZESS) 290 MCG CAPS CAPSULE    Take 1 capsule by mouth every morning (before breakfast) LOPERAMIDE (IMODIUM) 2 MG CAPSULE    Take 2 mg by mouth as needed for Diarrhea    MAGNESIUM HYDROXIDE (MILK OF MAGNESIA) 400 MG/5ML SUSPENSION    Take 30 mLs by mouth daily as needed for Constipation    MAGNESIUM OXIDE (MAG-OX) 400 (240 MG) MG TABLET    Take 1 tablet by mouth 2 times daily    MECLIZINE (ANTIVERT) 25 MG TABLET    Take 25 mg by mouth every 8 hours as needed for Dizziness    NALOXONE 4 MG/0.1ML LIQD NASAL SPRAY    1 spray by Nasal route as needed for Opioid Reversal    OMEPRAZOLE (PRILOSEC) 20 MG DELAYED RELEASE CAPSULE    Take 20 mg by mouth 2 times daily. ONDANSETRON (ZOFRAN-ODT) 4 MG DISINTEGRATING TABLET    Take 4 mg by mouth every 6 hours as needed for Nausea    POLYETHYL GLYCOL-PROPYL GLYCOL 0.4-0.3 % (SYSTANE ULTRA) 0.4-0.3 % OPHTHALMIC SOLUTION    Place 1 drop into both eyes 4 times daily as needed for Dry Eyes    POLYETHYLENE GLYCOL (GLYCOLAX) 17 GM/SCOOP POWDER    Take 17 g by mouth daily as needed (constipation)    POTASSIUM CHLORIDE (KLOR-CON M) 20 MEQ EXTENDED RELEASE TABLET    Take 20 mEq by mouth daily    PREGABALIN (LYRICA) 25 MG CAPSULE    Take 25 mg by mouth every 12 hours as needed (pain).     RHOPRESSA 0.02 % SOLN    Place 1 drop into the left eye nightly    SENNA (SENOKOT) 8.6 MG TABS TABLET    Take 1 tablet by mouth 2 times daily    SILVER SULFADIAZINE (SILVADENE) 1 % CREAM    Apply to BL lower leg ulcers daily    SODIUM PHOSPHATES (FLEET) 7-19 GM/118ML    Place 1 enema rectally daily as needed    SPIRONOLACTONE (ALDACTONE) 25 MG TABLET    Take 1 tablet by mouth daily    TAMSULOSIN (FLOMAX) 0.4 MG CAPSULE    Take 0.8 mg by mouth Daily    TORSEMIDE (DEMADEX) 100 MG TABLET    Take 0.5 tablets by mouth daily         ALLERGIES     Amitriptyline, Celecoxib, Cymbalta [duloxetine hcl], Elavil [amitriptyline hcl], Gabapentin, and Nsaids    FAMILY HISTORY       Family History   Problem Relation Age of Onset    Heart Disease Mother     Heart Disease Father     Cancer Father     Diabetes Brother     Diabetes Brother          SOCIAL HISTORY       Social History     Socioeconomic History    Marital status: Single     Spouse name: None    Number of children: None    Years of education: None    Highest education level: None   Tobacco Use    Smoking status: Former     Packs/day: 1.00     Years: 16.00     Pack years: 16.00     Types: Cigarettes     Quit date: 1/10/1988     Years since quittin.8    Smokeless tobacco: Never    Tobacco comments:     22 yrs ago   Vaping Use    Vaping Use: Never used   Substance and Sexual Activity    Alcohol use: No     Alcohol/week: 0.0 standard drinks    Drug use: No    Sexual activity: Not Currently       SCREENINGS     NIH Score       Glascow Melissa Coma Scale  Eye Opening: Spontaneous  Best Verbal Response: Oriented  Best Motor Response: Obeys commands  Melissa Coma Scale Score: 15    [facility]     Heart Score         PHYSICAL EXAM    (up to 7 for level 4, 8 ormore for level 5)     ED Triage Vitals [10/29/22 1152]   BP Temp Temp Source Heart Rate Resp SpO2 Height Weight   107/66 98.6 °F (37 °C) Oral 88 18 96 % 5' 9\" (1.753 m) 221 lb (100.2 kg)       Physical Exam  Vitals and nursing note reviewed. Constitutional:       Appearance: He is well-developed. He is obese. He is ill-appearing. He is not diaphoretic. HENT:      Head: Normocephalic and atraumatic. Nose: Nose normal.      Mouth/Throat:      Mouth: Mucous membranes are moist.      Pharynx: No oropharyngeal exudate. Eyes:      General:         Right eye: No discharge. Left eye: No discharge. Extraocular Movements: Extraocular movements intact. Conjunctiva/sclera: Conjunctivae normal.      Pupils: Pupils are equal, round, and reactive to light. Cardiovascular:      Rate and Rhythm: Normal rate and regular rhythm. Heart sounds: Normal heart sounds. No murmur heard. No friction rub. No gallop. Pulmonary:      Effort: Pulmonary effort is normal. No respiratory distress. Breath sounds: Normal breath sounds. No wheezing. Abdominal:      General: Bowel sounds are normal. There is no distension. Palpations: Abdomen is soft. Tenderness: There is no abdominal tenderness. Musculoskeletal:         General: Tenderness present. No deformity or signs of injury. Normal range of motion. Cervical back: Normal range of motion. Lumbar back: Tenderness present. No swelling, edema, deformity, lacerations, spasms or bony tenderness. Normal range of motion. Comments: Bilateral LE: SILT, Neuro Intact, DTR brisk, Pedal Pulses Palpable and equal, Strength 5/5, Capillary Refill <2 sec   Skin:     General: Skin is warm and dry. Capillary Refill: Capillary refill takes less than 2 seconds. Neurological:      General: No focal deficit present. Mental Status: He is alert and oriented to person, place, and time. Mental status is at baseline. Motor: Tremor present. Psychiatric:         Mood and Affect: Mood normal.         Behavior: Behavior normal.       DIAGNOSTIC RESULTS   LABS:    Labs Reviewed - No data to display    All other labs were within normal range or notreturned as of this dictation. EKG: All EKG's are interpreted by the Emergency Department Physician who either signs or Co-signs this chart in the absence of a cardiologist.  Please see their note for interpretation of EKG. RADIOLOGY:         Interpretation per the Radiologist below, if available at the time of this note:    No orders to display     No results found. PROCEDURES   Unless otherwise noted below, none     Procedures    CRITICAL CARE TIME   N/A    Is this patient to be included in the SEP-1 Core Measure due to severe sepsis or septic shock?    No   Exclusion criteria - the patient is NOT to be included for SEP-1 Core Measure due to:  2+ SIRS criteria are not met     CONSULTS:  None      EMERGENCY DEPARTMENT COURSE and DIFFERENTIAL DIAGNOSIS/MDM:   Vitals:    Vitals: 10/29/22 1152   BP: 107/66   Pulse: 88   Resp: 18   Temp: 98.6 °F (37 °C)   TempSrc: Oral   SpO2: 96%   Weight: 221 lb (100.2 kg)   Height: 5' 9\" (1.753 m)       Patient was given the following medications:  Medications - No data to display      Afebrile, stable, patient presents to the ED for evaluation. Seen in conjunction with attending ED provider who agrees with assessment and plan. ED Course as of 10/30/22 0124   Sat Oct 29, 2022   1503 I spoke with Dr. Reese Viramontes, with nephrology. He reviewed the patient's chart. He is familiar with the patient. He does not feel that the patient meets the need for dialysis at this time. He has had higher elevated GFR in the past.  He feels that patient's had been over diuresed prior to being admitted at his last admission. However patient appears to be back at his baseline. He advised at his last discharge date decrease the torsemide the patient was on. He was being provided with too much torsemide. He advised that going forward using patient's weight is the best indicator of whether or not to diurese. If he has 225 pounds or heavier than he should be taking torosemide as needed if he is 221 pounds or less then we will need to decrease the torsemide. [AR]   1505 From nephrology standpoint nothing further at this time inpatient, unless signifigant weight change [AR]   1633 Discussed the patient with Cardiology, Dr Abhijit Albarran, he requested I send him the EKGs to evaluate.    [AR]      ED Course User Index  [AR] Radha Kyle PA-C       Patients SPO2 is 96% on room air they are not hypoxic. Care plan evaluated. Labs evaluated reveal kidney function testing consistent with patient's baseline. Urinalysis is evaluated which shows no evidence of an acute bacterial infection. Patient has elevation of his troponin therefore this is trended. Patient has had past elevated troponins.   I called and I discussed cardiology, who advised that they did not feel that new acute EKG changes are present. Imaging of the patient's abdomen and pelvis in addition to his head are evaluated which show no acute findings. Also discussed patient with nephrology. At this time we do not feel that acute admission criteria are met. Patient is from a skilled nursing facility discussed a low suspicion for return to our department and close follow-up with patient's PCP. All questions are answered. Indications for return to the ED are discussed. Patient is advised if any new or worsening symptoms arise they should immediately return to the emergency room. Follow-up with primary care in 1-2 days. The patient tolerated their visit well. They were seen and evaluated by the attendingphysician, No att. providers found who agreed with the assessment and plan. The patient and / or the family were informed of the results of any tests, a time was given to answer questions, a plan was proposed and they agreed Donnalee Paling.     Results for orders placed or performed during the hospital encounter of 10/29/22   CBC with Auto Differential   Result Value Ref Range    WBC 7.1 4.0 - 11.0 K/uL    RBC 4.10 (L) 4.20 - 5.90 M/uL    Hemoglobin 11.4 (L) 13.5 - 17.5 g/dL    Hematocrit 34.5 (L) 40.5 - 52.5 %    MCV 84.1 80.0 - 100.0 fL    MCH 27.9 26.0 - 34.0 pg    MCHC 33.1 31.0 - 36.0 g/dL    RDW 19.6 (H) 12.4 - 15.4 %    Platelets 624 477 - 348 K/uL    MPV 8.7 5.0 - 10.5 fL    Neutrophils % 76.3 %    Lymphocytes % 13.5 %    Monocytes % 7.2 %    Eosinophils % 2.3 %    Basophils % 0.7 %    Neutrophils Absolute 5.4 1.7 - 7.7 K/uL    Lymphocytes Absolute 1.0 1.0 - 5.1 K/uL    Monocytes Absolute 0.5 0.0 - 1.3 K/uL    Eosinophils Absolute 0.2 0.0 - 0.6 K/uL    Basophils Absolute 0.0 0.0 - 0.2 K/uL   Comprehensive Metabolic Panel w/ Reflex to MG   Result Value Ref Range    Sodium 132 (L) 136 - 145 mmol/L    Potassium reflex Magnesium 3.3 (L) 3.5 - 5.1 mmol/L    Chloride 89 (L) 99 - 110 mmol/L    CO2 29 21 - 32 mmol/L    Anion Gap 14 3 - 16 Glucose 264 (H) 70 - 99 mg/dL    BUN 83 (HH) 7 - 20 mg/dL    Creatinine 1.5 (H) 0.8 - 1.3 mg/dL    Est, Glom Filt Rate 50 (A) >60    Calcium 9.8 8.3 - 10.6 mg/dL    Total Protein 7.8 6.4 - 8.2 g/dL    Albumin 3.9 3.4 - 5.0 g/dL    Albumin/Globulin Ratio 1.0 (L) 1.1 - 2.2    Total Bilirubin 0.8 0.0 - 1.0 mg/dL    Alkaline Phosphatase 103 40 - 129 U/L    ALT 19 10 - 40 U/L    AST 28 15 - 37 U/L   Urinalysis with Reflex to Culture    Specimen: Urine   Result Value Ref Range    Color, UA Yellow Straw/Yellow    Clarity, UA Clear Clear    Glucose, Ur Negative Negative mg/dL    Bilirubin Urine Negative Negative    Ketones, Urine Negative Negative mg/dL    Specific Gravity, UA 1.010 1.005 - 1.030    Blood, Urine Negative Negative    pH, UA 6.5 5.0 - 8.0    Protein, UA Negative Negative mg/dL    Urobilinogen, Urine 0.2 <2.0 E.U./dL    Nitrite, Urine Negative Negative    Leukocyte Esterase, Urine Negative Negative    Microscopic Examination Not Indicated     Urine Type NotGiven     Urine Reflex to Culture Not Indicated    Brain Natriuretic Peptide   Result Value Ref Range    Pro-BNP 2,082 (H) 0 - 124 pg/mL   Troponin   Result Value Ref Range    Troponin 0.14 (H) <0.01 ng/mL   Magnesium   Result Value Ref Range    Magnesium 1.50 (L) 1.80 - 2.40 mg/dL   Troponin   Result Value Ref Range    Troponin 0.12 (H) <0.01 ng/mL   EKG 12 Lead   Result Value Ref Range    Ventricular Rate 82 BPM    Atrial Rate 55 BPM    QRS Duration 88 ms    Q-T Interval 370 ms    QTc Calculation (Bazett) 432 ms    R Axis -44 degrees    T Axis 125 degrees    Diagnosis       Atrial fibrillationLeft axis deviationSeptal infarct , age undeterminedNonspecific ST abnormalityWhen compared with ECG of 18-AUG-2022 16:23,Atrial fibrillation has replaced Sinus rhythm with PAC'sSeptal infarct is now PresentConfirmed by ARSALAN VOSS MD ([id number]) on 10/29/2022 3:39:28 PM     EKG 12 Lead   Result Value Ref Range    Ventricular Rate 75 BPM    Atrial Rate 65 BPM    QRS Duration 88 ms    Q-T Interval 368 ms    QTc Calculation (Bazett) 410 ms    R Axis -42 degrees    T Axis 114 degrees    Diagnosis       Atrial fibrillationLeft axis deviationSeptal infarct (cited on or before 29-OCT-2022)Abnormal ECGWhen compared with ECG of 29-OCT-2022 12:43,Questionable change in initial forces of Septal leads         I estimate there is LOW risk for ACUTE APPENDICITIS, BOWEL OBSTRUCTION, CHOLECYSTITIS, DIVERTICULITIS, INCARCERATED HERNIA, PANCREATITIS, or PERFORATED BOWEL or ULCER, thus I consider the discharge disposition reasonable. Also, there is no evidence or peritonitis, sepsis, or toxicity. Lawrence Arnett and I have discussed the diagnosis and risks, and we agree with discharging home to follow-up with their primary doctor. We also discussed returning to the Emergency Department immediately if new or worsening symptoms occur. We have discussed the symptoms which are most concerning (e.g., bloody stool, fever, changing or worsening pain, vomiting) that necessitate immediate return. FINAL Impression    1. Acute renal failure superimposed on chronic kidney disease, unspecified CKD stage, unspecified acute renal failure type (HCC)    2. Elevated troponin        Blood pressure 110/74, pulse 88, temperature 98.6 °F (37 °C), temperature source Oral, resp. rate 21, height 5' 9\" (1.753 m), weight 221 lb 12.8 oz (100.6 kg), SpO2 96 %. FINAL IMPRESSION    No diagnosis found. DISPOSITION/PLAN   DISPOSITION        PATIENT REFERRED TO:  No follow-up provider specified. DISCHARGE MEDICATIONS:  New Prescriptions    No medications on file       DISCONTINUED MEDICATIONS:  Discontinued Medications    No medications on file              Pt was seen during the [place] 19 pandemic. Appropriate PPE worn by ME during patient encounters. Pt seen during a time with constrained hospital bed capacity and other potential inpatient and outpatient resources were constrained due to the viral pandemic. Please note that portions of this note were completed with a voice recognition program.  Efforts were made to edit the dictations but occasionally words are mis-transcribed.)    Leno Martinez PA-C (electronically signed)       Leno Martinez PA-C  10/30/22 0127

## 2022-10-30 LAB
EKG ATRIAL RATE: 65 BPM
EKG DIAGNOSIS: NORMAL
EKG Q-T INTERVAL: 368 MS
EKG QRS DURATION: 88 MS
EKG QTC CALCULATION (BAZETT): 410 MS
EKG R AXIS: -42 DEGREES
EKG T AXIS: 114 DEGREES
EKG VENTRICULAR RATE: 75 BPM

## 2022-10-30 PROCEDURE — 93010 ELECTROCARDIOGRAM REPORT: CPT | Performed by: INTERNAL MEDICINE

## 2022-10-30 NOTE — ED NOTES
Pt d/c. AVS reviewed and signed, all questions answered. NAD noted.      Vinicio Armando RN  10/29/22 2110

## 2022-10-31 ENCOUNTER — TELEPHONE (OUTPATIENT)
Dept: OTHER | Facility: CLINIC | Age: 67
End: 2022-10-31

## 2022-10-31 NOTE — TELEPHONE ENCOUNTER
RN access attempted to contact patient regarding need for ED follow-up appointment. Attempt was not successful. Left message to callback for help scheduling ED follow-up appointment.
Headache and dizziness

## 2022-11-02 ENCOUNTER — OFFICE VISIT (OUTPATIENT)
Dept: ENT CLINIC | Age: 67
End: 2022-11-02
Payer: MEDICARE

## 2022-11-02 VITALS — WEIGHT: 221 LBS | TEMPERATURE: 97.9 F | HEIGHT: 69 IN | RESPIRATION RATE: 16 BRPM | BODY MASS INDEX: 32.73 KG/M2

## 2022-11-02 DIAGNOSIS — H72.92 TYMPANIC MEMBRANE PERFORATION, LEFT: Primary | ICD-10-CM

## 2022-11-02 DIAGNOSIS — H65.492 COME (CHRONIC OTITIS MEDIA WITH EFFUSION), LEFT: ICD-10-CM

## 2022-11-02 PROCEDURE — 99213 OFFICE O/P EST LOW 20 MIN: CPT | Performed by: OTOLARYNGOLOGY

## 2022-11-02 PROCEDURE — 1123F ACP DISCUSS/DSCN MKR DOCD: CPT | Performed by: OTOLARYNGOLOGY

## 2022-11-02 PROCEDURE — 3017F COLORECTAL CA SCREEN DOC REV: CPT | Performed by: OTOLARYNGOLOGY

## 2022-11-02 PROCEDURE — G8427 DOCREV CUR MEDS BY ELIG CLIN: HCPCS | Performed by: OTOLARYNGOLOGY

## 2022-11-02 PROCEDURE — G8484 FLU IMMUNIZE NO ADMIN: HCPCS | Performed by: OTOLARYNGOLOGY

## 2022-11-02 PROCEDURE — G8417 CALC BMI ABV UP PARAM F/U: HCPCS | Performed by: OTOLARYNGOLOGY

## 2022-11-02 PROCEDURE — 1036F TOBACCO NON-USER: CPT | Performed by: OTOLARYNGOLOGY

## 2022-11-02 RX ORDER — CIPROFLOXACIN AND DEXAMETHASONE 3; 1 MG/ML; MG/ML
4 SUSPENSION/ DROPS AURICULAR (OTIC) 2 TIMES DAILY
Qty: 7.5 ML | Refills: 0 | Status: SHIPPED | OUTPATIENT
Start: 2022-11-02 | End: 2022-11-06

## 2022-11-02 NOTE — PROGRESS NOTES
prodex  [address] UP VISIT      Patient Name: Byron Rivera  Medical Record Number:  [id number]  Primary Care Physician:  Chanda Siegel MD    ChiefComplaint     Chief Complaint   Patient presents with    Cerumen Impaction     States that he needs his ears cleaned- States that he does not think he has an infection or fungus. History of Present Illness     Adnan Robyne Primrose is an 75 y.o. male previously seen for left sided headache and drainage with suspected acute otitis media by Dr. Kit Shore. Interval History 10/5/2020: Since last visit, states significantly improved otalgia without otorrhea; denies headache. Continues to have left-sided aural fullness and left-sided hearing loss. No vertigo, fevers, chills, night sweats. Patient is a diabetic, last A1c 6.5 on 9/20/2019. Interval history 10/15/2020: Since last visit, patient states minimal otorrhea from the left ear and resolution of pruritus. Continues to state poor hearing in the left ear. Intermittent head congestion, has been taking Excedrin which improves this. Interval history 12/15/2020: No interval pruritus, pain, otorrhea. Continues to have poor hearing in the left ear-states he is left-handed, and has difficulty using the telephone in the left ear. Interval history 3/9/2021: Patient with left ear otorrhea/pain that has been ongoing for the past two weeks. Improvement with course of Augmentin, however continued mild pain and drainage. Interval history 4/20/2021: Left ear otorrhea two weeks ago, improved with amoxicillin. No recent drainage.      Interval history 9/13/2021: Patient states throbbing pain in the left ear, ongoing for the past 2-3 weeks, with radiation down the mandible and into the temporal scalp.  4-5/10 intensity, no otorrhea or hearing loss    Interval history 6/6/2022: Patient states he has had 2 weeks of left ear pain, no drainage, pain was 8/10 last week however has decreased to 3-4/2010 today. Radiates to the scalp. No vertigo, but recent mechanical fall with 6 mm right SDH noted on 5/29/2022    Interval history 11/2/2022: Significant improvement in overall function-currently in a skilled nursing facility, has lost over 100 pounds, chest congestion significantly improved. States that he has had drainage from the ear over the last few days, no pain.      Past Medical History     Past Medical History:   Diagnosis Date    Anemia     Angina     Arthritis     CAD (coronary artery disease)     CHF (congestive heart failure) ([facility])     CKD (chronic kidney disease) stage 3, GFR 30-59 ml/min ([facility])     Clostridium difficile diarrhea 3/16/12; 2/29/12    positive stool toxin    Diabetes mellitus ([address]     Diabetic neuropathy ([facility])     feet and legs    Disease of blood and blood forming organ     Dizziness     When he moves his head/ loses his balance    GERD (gastroesophageal reflux disease)     gastric ulcer    Headache     Hearing loss     Hyperlipidemia     Hypertension     Kidney stone 2002    Refusal of blood transfusions as patient is [employer]     Sleep apnea     Tinnitus     Venous ulcer ([address]     LLE    Wound, open 1/13/2012       Past Surgical History     Past Surgical History:   Procedure Laterality Date    CARDIAC SURGERY      Dec 27 2010, triple bypass    CHOLECYSTECTOMY  9/2011    HERNIA REPAIR  age1    KNEE ARTHROCENTESIS Right 10/6/2022    RIGHT SHOULDER INTRA ARTICULAR INJECTION SITE CONFIRMED BY FLUOROSCOPY performed by Erwin Councilman, MD at [address]  OR    OTHER SURGICAL HISTORY  11-16-11 REPAIR LOWER STERNAL INCISION, REMOVAL OF ONE STERNAL WIRE,    OTHER SURGICAL HISTORY  10/10/2014    phacoemulsification of cataract with intraocular lens implant left eye    PAIN MANAGEMENT PROCEDURE N/A 2/10/2022    MIDLINE CERVICAL SIX SEVEN EPIDURAL STEROID INJECTION SITE CONFIRMED BY FLUOROSCOPY performed by Erwin Councilman, MD at [address] [facility] PAIN MANAGEMENT PROCEDURE N/A 2022    MIDLINE TO RIGHT CERVICAL SIX SEVEN EPIDURAL STEROID INJECTION SITE CONFIRMED BY FLUOROSCOPY performed by Raul Almanzar MD at [facility] OR    UPPER GASTROINTESTINAL ENDOSCOPY         Family History     Family History   Problem Relation Age of Onset    Heart Disease Mother     Heart Disease Father     Cancer Father     Diabetes Brother     Diabetes Brother        Social History     Social History     Tobacco Use    Smoking status: Former     Packs/day: 1.00     Years: 16.00     Pack years: 16.00     Types: Cigarettes     Quit date: 1/10/1988     Years since quittin.8    Smokeless tobacco: Never    Tobacco comments:     22 yrs ago   Vaping Use    Vaping Use: Never used   Substance Use Topics    Alcohol use: No     Alcohol/week: 0.0 standard drinks    Drug use: No        Allergies     Allergies   Allergen Reactions    Amitriptyline Other (See Comments)     tremor    Celecoxib      Hyperkalemia    Cymbalta [Duloxetine Hcl] Other (See Comments)     Tremors and slurred speech    Elavil [Amitriptyline Hcl]      tremor    Gabapentin      Tremor at high dose    Nsaids      Gastric ulcer       Medications     Current Outpatient Medications   Medication Sig Dispense Refill    ciprofloxacin-dexamethasone (CIPRODEX) 0.3-0.1 % otic suspension Place 4 drops into the left ear 2 times daily for 4 days 7.5 mL 0    torsemide (DEMADEX) 100 MG tablet Take 0.5 tablets by mouth daily 15 tablet 3    acetaminophen (TYLENOL) 325 MG tablet Take 650 mg by mouth every 4 hours as needed for Pain      cyclobenzaprine (FLEXERIL) 5 MG tablet Take 5 mg by mouth every 8 hours as needed for Muscle spasms      insulin glargine (LANTUS SOLOSTAR) 100 UNIT/ML injection pen Inject 40 Units into the skin daily      insulin lispro, 1 Unit Dial, (HUMALOG/ADMELOG) 100 UNIT/ML SOPN Inject into the skin 3 times daily Per sliding scale      meclizine (ANTIVERT) 25 MG tablet Take 25 mg by mouth every 8 hours as needed for Dizziness      pregabalin (LYRICA) 25 MG capsule Take 25 mg by mouth every 12 hours as needed (pain).       polyethyl glycol-propyl glycol 0.4-0.3 % (SYSTANE ULTRA) 0.4-0.3 % ophthalmic solution Place 1 drop into both eyes 4 times daily as needed for Dry Eyes      polyethylene glycol (GLYCOLAX) 17 GM/SCOOP powder Take 17 g by mouth daily as needed (constipation)      magnesium oxide (MAG-OX) 400 (240 Mg) MG tablet Take 1 tablet by mouth 2 times daily 30 tablet 0    spironolactone (ALDACTONE) 25 MG tablet Take 1 tablet by mouth daily 30 tablet 3    potassium chloride (KLOR-CON M) 20 MEQ extended release tablet Take 20 mEq by mouth daily      Sodium Phosphates (FLEET) 7-19 GM/118ML Place 1 enema rectally daily as needed      glucagon 1 MG injection Inject 1 kit into the muscle as needed (for hypoglycemia) Emergencies only      loperamide (IMODIUM) 2 MG capsule Take 2 mg by mouth as needed for Diarrhea      magnesium hydroxide (MILK OF MAGNESIA) 400 MG/5ML suspension Take 30 mLs by mouth daily as needed for Constipation      ondansetron (ZOFRAN-ODT) 4 MG disintegrating tablet Take 4 mg by mouth every 6 hours as needed for Nausea      RHOPRESSA 0.02 % SOLN Place 1 drop into the left eye nightly      dorzolamide (TRUSOPT) 2 % ophthalmic solution Place 1 drop into the left eye 2 times daily      brimonidine-timolol (COMBIGAN) 0.2-0.5 % ophthalmic solution Place 1 drop into the left eye 2 times daily      isosorbide mononitrate (IMDUR) 30 MG extended release tablet Take 1 tablet by mouth daily 30 tablet 1    carvedilol (COREG) 3.125 MG tablet Take 1 tablet by mouth 2 times daily (with meals) 60 tablet 3    linaclotide (LINZESS) 290 MCG CAPS capsule Take 1 capsule by mouth every morning (before breakfast) 30 capsule 1    tamsulosin (FLOMAX) 0.4 MG capsule Take 0.8 mg by mouth Daily      atorvastatin (LIPITOR) 80 MG tablet Take 1 tablet by mouth nightly 30 tablet 3    allopurinol (ZYLOPRIM) 300 MG tablet Take 1 tablet by mouth daily 30 tablet 0    naloxone 4 MG/0.1ML LIQD nasal spray 1 spray by Nasal route as needed for Opioid Reversal 1 each 0    senna (SENOKOT) 8.6 MG TABS tablet Take 1 tablet by mouth 2 times daily 120 tablet 0    docusate sodium (COLACE, DULCOLAX) 100 MG CAPS Take 100 mg by mouth 2 times daily      silver sulfADIAZINE (SILVADENE) 1 % cream Apply to BL lower leg ulcers daily 20 g 0    fluticasone (FLONASE) 50 MCG/ACT nasal spray 1 spray by Each Nostril route daily as needed for Allergies      ferrous sulfate 325 (65 FE) MG tablet Take 325 mg by mouth 2 times daily       omeprazole (PRILOSEC) 20 MG delayed release capsule Take 20 mg by mouth 2 times daily. No current facility-administered medications for this visit.        Review of Systems     REVIEW OF SYSTEMS  The following systems were reviewed and revealed the following in addition to any already discussed in the HPI:    CONSTITUTIONAL: No weight loss, no fever, no night sweats, no chills  EYES: no vision changes, no blurry vision  EARS: no changes in hearing, +otalgia  NOSE: no epistaxis, no rhinorrhea  RESPIRATORY: No difficulty breathing, no shortness of breath  CV: no chest pain, no peripheral vascular disease  HEME: No coagulation disorder, no bleeding disorder  NEURO: No TIA or stroke-like symptoms  SKIN: No new rashes in the head and neck, no recent skin cancers  MOUTH: Delete no new ulcers, no recent teeth infections  GASTROINTESTINAL: No diarrhea, stomach pain  PSYCH: No anxiety, no depression    PhysicalExam     Vitals:    11/02/22 1403   Resp: 16   Temp: 97.9 °F (36.6 °C)   TempSrc: Infrared   Weight: 221 lb (100.2 kg)   Height: 5' 9\" (1.753 m)       PHYSICAL EXAM  Temp 97.9 °F (36.6 °C) (Infrared)   Resp 16   Ht 5' 9\" (1.753 m)   Wt 221 lb (100.2 kg)   BMI 32.64 kg/m²     GENERAL: No Acute Distress, Alert and Oriented, no hoarseness  EYES: EOMI, Anti-icteric  NOSE: On anterior rhinoscopy there is no epistaxis, nasal mucosa within normal limits, no purulent drainage  EARS: Normal external appearance; on portable otomicroscopy:  -Right ear: External auditory canal without stenosis, tympanic membrane clear, no middle ear effusions or retractions  -Left ear: External auditory canal with anterior-inferior tympanic membrane perforation, 20%, central; purulence noted in the inferior tympanic sulcus  FACE: 1/6 House-Brackmann Scale, symmetric, sensation equal bilaterally  ORAL CAVITY: No masses or lesions palpated, uvula is midline, moist mucous membranes, tongue midline/mobile  NECK: Normal range of motion, no thyromegaly, trachea is midline, no lymphadenopathy, no neck masses, no crepitus  CHEST: Normal respiratory effort, no retractions, breathing comfortably  SKIN: No rashes, normal appearing skin, no evidence of skin lesions/tumors  NEURO: CN 2, 3, 4, 5, 6, 7, 11, 12 intact bilaterally     Audiogram 6/6/22:         Procedure     Assessment and Plan     1. Mixed conductive and sensorineural hearing loss of left ear with restricted hearing of right ear  Patient with mixed hearing loss of left ear, with significant conductive gap-likely due to left tympanic membrane perforation but may also have an element of ossicular chain discontinuity/fixation. His tympanic membrane remains perforated, and we discussed tympanoplasty with possible ossicular chain reconstruction. Patient states that once he gets his living situation stabilized he will call us if he desires surgery    2. Tympanic membrane perforation, left  Left tympanic membrane, present, appears to be improving - now only 20%    3. Left ear COME  Will treat with eardrops for 4 days, see him back in 3 months    Return in about 3 months (around 2/2/2023).     Leetta Shone, MD  [facility]  Department of Otolaryngology/Head and Neck Surgery  11/2/22    I have performed a head and neck physical exam personally or was physically present during the key or critical portions of the service. Medical Decision Making: The following items were considered in medical decision making:  Independent review of images  Review / order clinical lab tests  Review / order radiology tests  Decision to obtain old records  Review and summation of old records as accessed through [facility] (a summary of my findings in these old records: None)     Portions of this note were dictated using Dragon.  There may be linguistic errors secondary to the use of this program.

## 2022-11-20 PROBLEM — E86.0 DEHYDRATION: Status: RESOLVED | Noted: 2022-10-21 | Resolved: 2022-11-20

## 2022-11-25 NOTE — PLAN OF CARE
Patient invited to attend morning community meeting. Patient declined to attend psycho-education group. Will continue to invite to attend patient to groups. Patient's EF (Ejection Fraction) is greater than 40%    Heart Failure Medications:   Diuretics[de-identified] Torsemide and Metalozone     (One of the following REQUIRED for EF </= 40%/SYSTOLIC FAILURE but MAY be used in EF% >40%/DIASTOLIC FAILURE)        ACE[de-identified] None        ARB[de-identified] None         ARNI[de-identified] None    (Beta Blockers)   NON- Evidenced Based Beta Blocker (for EF% >40%/DIASTOLIC FAILURE): None     Evidenced Based Beta Blocker::(REQUIRED for EF% <40%/SYSTOLIC FAILURE) Carvedilol- Coreg  . .................................................................................................................................................. Patient's weights and intake/output reviewed: Yes    Patient's Last Weight: 317 lbs obtained by standing scale. Difference of 2 lbs more than last documented weight. Intake/Output Summary (Last 24 hours) at 5/2/2022 0155  Last data filed at 5/2/2022 1215  Gross per 24 hour   Intake 840 ml   Output 3475 ml   Net -2635 ml       Comorbidities Reviewed Yes    Patient has a past medical history of Anemia, Angina, Arthritis, CAD (coronary artery disease), CHF (congestive heart failure) ([address] CKD (chronic kidney disease) stage 3, GFR 30-59 ml/min ([facility]), Clostridium difficile diarrhea, Diabetes mellitus ([address] Diabetic neuropathy ([address] Disease of blood and blood forming organ, Dizziness, GERD (gastroesophageal reflux disease), Headache, Hearing loss, Hyperlipidemia, Hypertension, Kidney stone, Refusal of blood transfusions as patient is [employer], Sleep apnea, Tinnitus, Venous ulcer ([address] and Wound, open. >>For CHF and Comorbidity documentation on Education Time and Topics, please see Education Tab    Progressive Mobility Assessment:  What is this patient's Current Level of Mobility?: Ambulatory-Up Ad Mami  How was this patient Mobilized today?: Edge of Bed, Up to Chair,  Up to Toilet/Shower and Up in Room, ambulated 30 ft                 With Whom?  Nurse, PCA and Self Level of Difficulty/Assistance: 1x Assist     Pt resting in bed at this time on 4 L O2. Pt with complaints of shortness of breath. Pt with pitting lower extremity edema.      Patient and/or Family's stated Goal of Care this Admission: reduce shortness of breath, increase activity tolerance, better understand heart failure and disease management, be more comfortable and reduce lower extremity edema prior to discharge        :

## 2022-11-30 DIAGNOSIS — K42.9 UMBILICAL HERNIA WITHOUT OBSTRUCTION AND WITHOUT GANGRENE: Primary | ICD-10-CM

## 2022-12-08 ENCOUNTER — OFFICE VISIT (OUTPATIENT)
Dept: CARDIOLOGY CLINIC | Age: 67
End: 2022-12-08
Payer: MEDICARE

## 2022-12-08 ENCOUNTER — TELEPHONE (OUTPATIENT)
Dept: CARDIOLOGY CLINIC | Age: 67
End: 2022-12-08

## 2022-12-08 VITALS — SYSTOLIC BLOOD PRESSURE: 110 MMHG | OXYGEN SATURATION: 94 % | HEART RATE: 77 BPM | DIASTOLIC BLOOD PRESSURE: 60 MMHG

## 2022-12-08 DIAGNOSIS — I48.0 PAF (PAROXYSMAL ATRIAL FIBRILLATION) (HCC): ICD-10-CM

## 2022-12-08 DIAGNOSIS — N18.4 STAGE 4 CHRONIC KIDNEY DISEASE (HCC): ICD-10-CM

## 2022-12-08 DIAGNOSIS — I50.32 CHRONIC DIASTOLIC HEART FAILURE (HCC): Primary | ICD-10-CM

## 2022-12-08 DIAGNOSIS — I27.81 COR PULMONALE, CHRONIC (HCC): ICD-10-CM

## 2022-12-08 PROCEDURE — 3017F COLORECTAL CA SCREEN DOC REV: CPT | Performed by: NURSE PRACTITIONER

## 2022-12-08 PROCEDURE — 3074F SYST BP LT 130 MM HG: CPT | Performed by: NURSE PRACTITIONER

## 2022-12-08 PROCEDURE — G8484 FLU IMMUNIZE NO ADMIN: HCPCS | Performed by: NURSE PRACTITIONER

## 2022-12-08 PROCEDURE — 1123F ACP DISCUSS/DSCN MKR DOCD: CPT | Performed by: NURSE PRACTITIONER

## 2022-12-08 PROCEDURE — 1036F TOBACCO NON-USER: CPT | Performed by: NURSE PRACTITIONER

## 2022-12-08 PROCEDURE — 99213 OFFICE O/P EST LOW 20 MIN: CPT | Performed by: NURSE PRACTITIONER

## 2022-12-08 PROCEDURE — 3078F DIAST BP <80 MM HG: CPT | Performed by: NURSE PRACTITIONER

## 2022-12-08 PROCEDURE — G8427 DOCREV CUR MEDS BY ELIG CLIN: HCPCS | Performed by: NURSE PRACTITIONER

## 2022-12-08 PROCEDURE — G8417 CALC BMI ABV UP PARAM F/U: HCPCS | Performed by: NURSE PRACTITIONER

## 2022-12-08 NOTE — TELEPHONE ENCOUNTER
Maddy Robles with [facility] called to say that he is working on sending the labs from their facility so that nprb can review. Maddy Robles stated that he will call i in the morning on 12/9 to check if we have received them.

## 2022-12-08 NOTE — PROGRESS NOTES
[address]   Cardiac Follow-up    Primary Care Doctor:  Eugenia Davis MD    No chief complaint on file. History of Present Illness:   I had the pleasure of seeing Ade Jensen in follow up for diastolic heart failure, HTN, CAD s/p CABG, pHTN, CKD. Directly admitted from my office to [facility] for volume overload, treated with lasix infusion on 8/24/22-9/1/22. Admission weight up to 302lbs; He had 8L paracentesis. Discharged on torsemide 100mg BID and metolazone three times a week per nephrology. Recently admitted  10/21/22- 10/25/22 with fatigue and abnormal labs with  and creatinine 1.9. treated with IVFs. Admissions weight  was 194lb    Since last visit, he had COVID last month he had a lot of fatigue. In the ER 10/29/22. He is talking about needing to start dialysis. He feels like he is not getting enough food. Edema is stable. He is not using the oxygen anymore. No shortness of breath. No chest pain. Just had labs on Tuesday. Ade Jensen describes symptoms including fatigue, edema  but denies chest pain, palpitations, orthopnea, syncope. Home weights: 224 lbs, he would like his weight to be back down to the 200's. Appetite: good   Fluid intake:  Diet: nursing home diet. He is frustrated with the amount of food he is getting (not enough)  He has his own small cooler with cheese and bread. Although he wants to stay away from carbohydrates.      NYHA:   III  ACC/ AHA Stage:    C    Past Medical History:   has a past medical history of Anemia, Angina, Arthritis, CAD (coronary artery disease), CHF (congestive heart failure) ([address] CKD (chronic kidney disease) stage 3, GFR 30-59 ml/min ([facility]), Clostridium difficile diarrhea, Diabetes mellitus ([address] Diabetic neuropathy ([facility] [address] Disease of blood and blood forming organ, Dizziness, GERD (gastroesophageal reflux disease), Headache, Hearing loss, Hyperlipidemia, Hypertension, Kidney stone, Refusal of blood transfusions as patient is [employer], Sleep apnea, Tinnitus, Venous ulcer ([address] and Wound, open. Surgical History:   has a past surgical history that includes hernia repair (age1); Cholecystectomy (9/2011); other surgical history (11-16-11 REPAIR LOWER STERNAL INCISION, REMOVAL OF ONE STERNAL WIRE,); Upper gastrointestinal endoscopy; Cardiac surgery; other surgical history (10/10/2014); Pain management procedure (N/A, 2/10/2022); Pain management procedure (N/A, 7/21/2022); and Knee Arthrocentesis (Right, 10/6/2022). Social History:   reports that he quit smoking about 34 years ago. His smoking use included cigarettes. He has a 16.00 pack-year smoking history. He has never used smokeless tobacco. He reports that he does not drink alcohol and does not use drugs. Family History:   Family History   Problem Relation Age of Onset    Heart Disease Mother     Heart Disease Father     Cancer Father     Diabetes Brother     Diabetes Brother        Home Medications:  Prior to Admission medications    Medication Sig Start Date End Date Taking? Authorizing Provider   torsemide (DEMADEX) 100 MG tablet Take 0.5 tablets by mouth daily 10/26/22   PATRICK Little CNP   acetaminophen (TYLENOL) 325 MG tablet Take 650 mg by mouth every 4 hours as needed for Pain    Historical Provider, MD   cyclobenzaprine (FLEXERIL) 5 MG tablet Take 5 mg by mouth every 8 hours as needed for Muscle spasms    Historical Provider, MD   insulin glargine (LANTUS SOLOSTAR) 100 UNIT/ML injection pen Inject 40 Units into the skin daily    Historical Provider, MD   insulin lispro, 1 Unit Dial, (HUMALOG/ADMELOG) 100 UNIT/ML SOPN Inject into the skin 3 times daily Per sliding scale    Historical Provider, MD   meclizine (ANTIVERT) 25 MG tablet Take 25 mg by mouth every 8 hours as needed for Dizziness    Historical Provider, MD   pregabalin (LYRICA) 25 MG capsule Take 25 mg by mouth every 12 hours as needed (pain).     Historical Provider, MD   polyethyl glycol-propyl glycol 0.4-0.3 % (SYSTANE ULTRA) 0.4-0.3 % ophthalmic solution Place 1 drop into both eyes 4 times daily as needed for Dry Eyes    Historical Provider, MD   polyethylene glycol (GLYCOLAX) 17 GM/SCOOP powder Take 17 g by mouth daily as needed (constipation)    Historical Provider, MD   magnesium oxide (MAG-OX) 400 (240 Mg) MG tablet Take 1 tablet by mouth 2 times daily 9/1/22   EYAD Burr   spironolactone (ALDACTONE) 25 MG tablet Take 1 tablet by mouth daily 9/2/22   EYAD Burr   potassium chloride (KLOR-CON M) 20 MEQ extended release tablet Take 20 mEq by mouth daily 7/29/22   Historical Provider, MD   Sodium Phosphates (FLEET) 7-19 GM/118ML Place 1 enema rectally daily as needed    Historical Provider, MD   glucagon 1 MG injection Inject 1 kit into the muscle as needed (for hypoglycemia) Emergencies only    Historical Provider, MD   loperamide (IMODIUM) 2 MG capsule Take 2 mg by mouth as needed for Diarrhea    Historical Provider, MD   magnesium hydroxide (MILK OF MAGNESIA) 400 MG/5ML suspension Take 30 mLs by mouth daily as needed for Constipation    Historical Provider, MD   ondansetron (ZOFRAN-ODT) 4 MG disintegrating tablet Take 4 mg by mouth every 6 hours as needed for Nausea 5/16/22   Historical Provider, MD   RHOPRESSA 0.02 % SOLN Place 1 drop into the left eye nightly 5/19/22   Historical Provider, MD   dorzolamide (TRUSOPT) 2 % ophthalmic solution Place 1 drop into the left eye 2 times daily 5/21/22   Historical Provider, MD   brimonidine-timolol (COMBIGAN) 0.2-0.5 % ophthalmic solution Place 1 drop into the left eye 2 times daily 5/20/22   Historical Provider, MD   isosorbide mononitrate (IMDUR) 30 MG extended release tablet Take 1 tablet by mouth daily 5/26/22   PATRICK Lees - CNP   carvedilol (COREG) 3.125 MG tablet Take 1 tablet by mouth 2 times daily (with meals) 4/9/22   Dominic Farris MD   linaclotide [address]) 290 MCG CAPS capsule Take 1 capsule by mouth every morning (before breakfast) 4/10/22   Marquis Lopez MD   tamsulosin [facility]) 0.4 MG capsule Take 0.8 mg by mouth Daily 12/30/21   Historical Provider, MD   atorvastatin (LIPITOR) 80 MG tablet Take 1 tablet by mouth nightly 3/14/22   PATRICK Whyte CNP   allopurinol (ZYLOPRIM) 300 MG tablet Take 1 tablet by mouth daily 3/15/22   Steffanie Sanchez MD   naloxone 4 MG/0.1ML LIQD nasal spray 1 spray by Nasal route as needed for Opioid Reversal 11/23/21   Hilda Alex MD   senna (SENOKOT) 8.6 MG TABS tablet Take 1 tablet by mouth 2 times daily 7/8/19   PATRICK Medeiros CNP   docusate sodium (COLACE, DULCOLAX) 100 MG CAPS Take 100 mg by mouth 2 times daily 5/1/18   PATRICK Mensah CNP   silver sulfADIAZINE (SILVADENE) 1 % cream Apply to BL lower leg ulcers daily 2/9/16   Barbara Salinas MD   nitroGLYCERIN (NITROSTAT) 0.4 MG SL tablet Place 1 tablet under the tongue every 5 minutes as needed  Patient not taking: No sig reported 8/17/15 9/1/22  PATRICK Swan CNP   fluticasone (FLONASE) 50 MCG/ACT nasal spray 1 spray by Each Nostril route daily as needed for Allergies    Historical Provider, MD   ferrous sulfate 325 (65 FE) MG tablet Take 325 mg by mouth 2 times daily  12/22/10   Historical Provider, MD   omeprazole (PRILOSEC) 20 MG delayed release capsule Take 20 mg by mouth 2 times daily.  12/22/10   Historical Provider, MD        Allergies:  Amitriptyline, Celecoxib, Cymbalta [duloxetine hcl], Elavil [amitriptyline hcl], Gabapentin, and Nsaids     Physical Examination:    Vitals:    12/08/22 1536   BP: 110/60   Pulse: 77   SpO2: 94%     Physical exam was reviewed and updated as below at the time of the visit:     Constitutional and General Appearance: no apparent distress, obese  HEENT: non-icteric sclera,  disconjugate gaze  Neck: JVP difficult to assess   Respiratory:  No use of accessory muscles  Diminished breath sounds throughout, no wheezing, no crackles, no rhonchi  Cardiovascular:  + 3/6 high pitched systolic murmur loudest at second right sternal border, no rub or gallop  Regular rate and rhythm, S1,S2 distant  Radial pulses 2+ and equal bilaterally  +  1 BLE edema R>L, legs look better than I have seen in a while for him.    Abdomen:  No masses or tenderness  Liver: No Abnormalities Noted  Musculoskeletal/Skin:  Wheelchair   There is no clubbing, cyanosis of the extremities  Skin is warm and dry  Moves all extremities   Neurological/Psychiatric:  Alert and oriented in all spheres  No abnormalities of mood, affect, memory, mentation, or behavior are noted    Lab Data:  CBC:   Lab Results   Component Value Date/Time    WBC 7.1 10/29/2022 12:41 PM    WBC 8.1 10/22/2022 07:01 AM    WBC 8.1 10/21/2022 11:50 AM    RBC 4.10 10/29/2022 12:41 PM    RBC 4.41 10/22/2022 07:01 AM    RBC 4.35 10/21/2022 11:50 AM    HGB 11.4 10/29/2022 12:41 PM    HGB 12.0 10/22/2022 07:01 AM    HGB 11.9 10/21/2022 11:50 AM    HCT 34.5 10/29/2022 12:41 PM    HCT 36.4 10/22/2022 07:01 AM    HCT 35.5 10/21/2022 11:50 AM    MCV 84.1 10/29/2022 12:41 PM    MCV 82.5 10/22/2022 07:01 AM    MCV 81.5 10/21/2022 11:50 AM    RDW 19.6 10/29/2022 12:41 PM    RDW 18.4 10/22/2022 07:01 AM    RDW 18.8 10/21/2022 11:50 AM     10/29/2022 12:41 PM     10/22/2022 07:01 AM     10/21/2022 11:50 AM     BMP:   Lab Results   Component Value Date/Time     10/29/2022 12:41 PM     10/25/2022 07:30 AM     10/24/2022 11:51 AM    K 3.3 10/29/2022 12:41 PM    K 3.9 10/25/2022 07:30 AM    K 3.6 10/24/2022 11:51 AM    K 3.9 10/24/2022 12:25 AM    K 4.0 10/21/2022 11:50 AM    K 3.8 05/28/2022 10:57 PM    CL 89 10/29/2022 12:41 PM    CL 90 10/25/2022 07:30 AM    CL 92 10/24/2022 11:51 AM    CO2 29 10/29/2022 12:41 PM    CO2 30 10/25/2022 07:30 AM    CO2 30 10/24/2022 11:51 AM    PHOS 3.4 09/01/2022 08:20 AM    PHOS 4.1 08/24/2022 12:16 PM    PHOS 3.4 05/03/2022 08:46 AM    BUN 83 10/29/2022 12:41 PM    BUN 72 10/25/2022 07:30 AM    BUN 76 10/24/2022 11:51 AM    CREATININE 1.5 10/29/2022 12:41 PM    CREATININE 1.4 10/25/2022 07:30 AM    CREATININE 1.3 10/24/2022 11:51 AM     BNP:   Lab Results   Component Value Date/Time    PROBNP 2,082 10/29/2022 12:41 PM    PROBNP 2,317 10/24/2022 11:51 AM    PROBNP 6,892 08/24/2022 12:16 PM       Recent Testing:  Echo 9/2016  Left ventricular systolic function is normal with ejection fraction   estimated at 55 %. Diastolic septal flattening consistent with right ventricular overload. Diastolic filling parameters suggests grade II diastolic dysfunction. Mild aortic stenosis. Right ventricle is mildly enlarged. Right ventricular systolic function is normal, TAPSE 19 mm. Inadequate tricuspid regurgitation jet to estimate systolic pulmonary artery   pressure (SPAP). Echocardiogram 1/13/2022   Summary   Limited only f/u for LVEF and aortic valve stenosis. Technically difficult examination. Low normal left ventricular systolic function with ejection fraction of 50%. Moderate aortic stenosis. Aortic stenosis values appear approximately same when compared to echo on 8-. Mild to moderate aortic regurgitation. Assessment:   chronic diastolic heart failure  CAD s/p CABG x3  PAF; patient declines anticoagulation is willing to accept stroke risk per year  Moderate aortic stenosis  CKD - baseline creatinine 1.7   chronic hypoxemic respiratory failure- currently off of oxygen   Severe HENNA  Diabetes  HLD  HTN  Anemia, patient is [employer]  Fall s/p Subdural hematoma 5/28/22  Chronic venous stasis     1. Chronic diastolic heart failure (HCC)    2. Stage 4 chronic kidney disease ([address]    3. Cor pulmonale, chronic ([address]    4. PAF (paroxysmal atrial fibrillation) (HCC)      Plan:   Continue daily weights  Torsemide 50mg daily for now; he is asking for a higher dose of the torsemide to get his weight back down.  I do not see that

## 2022-12-08 NOTE — PATIENT INSTRUCTIONS
Plan:   Continue daily weights  Torsemide 50mg daily for now  Continue coreg 3.125mg twice a day    potassium to 10meq daily until labs reviewed  Follow up with nephrology as planned   Follow up 3 months

## 2022-12-12 LAB
B-TYPE NATRIURETIC PEPTIDE: 98.5 PG/ML
BUN BLDV-MCNC: 56 MG/DL
CALCIUM SERPL-MCNC: 9.2 MG/DL
CHLORIDE BLD-SCNC: 103 MMOL/L
CO2: 24 MMOL/L
CREAT SERPL-MCNC: 1.1 MG/DL
GFR CALCULATED: 67
GLUCOSE BLD-MCNC: 148 MG/DL
MAGNESIUM: 2 MG/DL
POTASSIUM SERPL-SCNC: 4.1 MMOL/L
SODIUM BLD-SCNC: 138 MMOL/L

## 2022-12-13 ENCOUNTER — TELEPHONE (OUTPATIENT)
Dept: CARDIOLOGY CLINIC | Age: 67
End: 2022-12-13

## 2022-12-13 NOTE — TELEPHONE ENCOUNTER
----- Message from PATRICK Batista CNP sent at 12/12/2022  3:54 PM EST -----  Reviewed, WOW this labs look great on current regimen.

## 2022-12-19 LAB
BUN BLDV-MCNC: 54 MG/DL
CALCIUM SERPL-MCNC: 9.4 MG/DL
CHLORIDE BLD-SCNC: 101 MMOL/L
CO2: 26 MMOL/L
CREAT SERPL-MCNC: 1.2 MG/DL
GFR CALCULATED: 60
GLUCOSE BLD-MCNC: 221 MG/DL
MAGNESIUM: 1.8 MG/DL
POTASSIUM SERPL-SCNC: 4.1 MMOL/L
SODIUM BLD-SCNC: 139 MMOL/L

## 2022-12-20 LAB
BUN BLDV-MCNC: 66 MG/DL
CALCIUM SERPL-MCNC: 9.3 MG/DL
CHLORIDE BLD-SCNC: 103 MMOL/L
CO2: 24 MMOL/L
CREAT SERPL-MCNC: 1.3 MG/DL
GFR CALCULATED: 55
GLUCOSE BLD-MCNC: 133 MG/DL
POTASSIUM SERPL-SCNC: 4.3 MMOL/L
SODIUM BLD-SCNC: 140 MMOL/L

## 2022-12-23 ENCOUNTER — TELEPHONE (OUTPATIENT)
Dept: CARDIOLOGY CLINIC | Age: 67
End: 2022-12-23

## 2022-12-23 NOTE — TELEPHONE ENCOUNTER
----- Message from PATRICK Ochoa CNP sent at 12/23/2022 11:34 AM EST -----  Reviewed. Labs are stable for him. No changes. Please call nursing home.  Thanks

## 2023-01-04 NOTE — PROGRESS NOTES
Updates provided to Durga Gee and Juan Jose Laurent at this time. They are requesting to speak with CM after pt makes decision of home vs. ECF. Verbal bedside shift change report given to Natalia Portillo (oncoming nurse) by Jaylene Ferraro (offgoing nurse). Report included the following information SBAR, Kardex, Intake/Output, MAR, Recent Results, Cardiac Rhythm Sinus Tachy, and Alarm Parameters. 2115 , hospitalist contacted, ordered additional 4 units for coverage    TRANSFER - OUT REPORT: 0114    Verbal report given to RN (name) on Tyron [place]  being transferred to [facility](unit) for routine progression of care       Report consisted of patients Situation, Background, Assessment and   Recommendations(SBAR). Information from the following report(s) SBAR, Kardex, Intake/Output, MAR, Recent Results, Med Rec Status, and Cardiac Rhythm Sinus rhythm  was reviewed with the receiving nurse. Lines:   Peripheral IV 12/22/22 Left Antecubital (Active)   Site Assessment Clean, dry, & intact 01/04/23 0000   Phlebitis Assessment 0 01/04/23 0000   Infiltration Assessment 0 01/04/23 0000   Dressing Status Clean, dry, & intact 01/04/23 0000   Dressing Type Transparent 01/04/23 0000   Hub Color/Line Status Pink;Capped 01/04/23 0000   Action Taken Open ports on tubing capped 01/04/23 0000   Alcohol Cap Used Yes 01/04/23 0000       Peripheral IV 01/03/23 Anterior;Proximal;Right Forearm (Active)   Site Assessment Clean, dry, & intact 01/04/23 0000   Phlebitis Assessment 0 01/04/23 0000   Infiltration Assessment 0 01/04/23 0000   Dressing Status Clean, dry, & intact 01/04/23 0000   Dressing Type Transparent 01/04/23 0000   Hub Color/Line Status Blue 01/04/23 0000   Action Taken Open ports on tubing capped 01/04/23 0000   Alcohol Cap Used Yes 01/04/23 0000        Opportunity for questions and clarification was provided.       Patient transported with:   Monitor  Patient-specific medications from Pharmacy  Registered Nurse    [id number] Patient's wife updated with room change

## 2023-02-06 ENCOUNTER — OFFICE VISIT (OUTPATIENT)
Dept: ENT CLINIC | Age: 68
End: 2023-02-06
Payer: MEDICARE

## 2023-02-06 VITALS — BODY MASS INDEX: 33.62 KG/M2 | TEMPERATURE: 98.1 F | RESPIRATION RATE: 16 BRPM | HEIGHT: 69 IN | WEIGHT: 227 LBS

## 2023-02-06 DIAGNOSIS — H66.015 RECURRENT ACUTE SUPPURATIVE OTITIS MEDIA WITH SPONTANEOUS RUPTURE OF LEFT TYMPANIC MEMBRANE: ICD-10-CM

## 2023-02-06 DIAGNOSIS — H90.A32 MIXED CONDUCTIVE AND SENSORINEURAL HEARING LOSS OF LEFT EAR WITH RESTRICTED HEARING OF RIGHT EAR: ICD-10-CM

## 2023-02-06 DIAGNOSIS — R04.0 EPISTAXIS: Primary | ICD-10-CM

## 2023-02-06 DIAGNOSIS — H72.92 TYMPANIC MEMBRANE PERFORATION, LEFT: ICD-10-CM

## 2023-02-06 PROCEDURE — G8484 FLU IMMUNIZE NO ADMIN: HCPCS | Performed by: OTOLARYNGOLOGY

## 2023-02-06 PROCEDURE — 3017F COLORECTAL CA SCREEN DOC REV: CPT | Performed by: OTOLARYNGOLOGY

## 2023-02-06 PROCEDURE — G8427 DOCREV CUR MEDS BY ELIG CLIN: HCPCS | Performed by: OTOLARYNGOLOGY

## 2023-02-06 PROCEDURE — 1036F TOBACCO NON-USER: CPT | Performed by: OTOLARYNGOLOGY

## 2023-02-06 PROCEDURE — 31238 NSL/SINS NDSC SRG NSL HEMRRG: CPT | Performed by: OTOLARYNGOLOGY

## 2023-02-06 PROCEDURE — 1123F ACP DISCUSS/DSCN MKR DOCD: CPT | Performed by: OTOLARYNGOLOGY

## 2023-02-06 PROCEDURE — G8417 CALC BMI ABV UP PARAM F/U: HCPCS | Performed by: OTOLARYNGOLOGY

## 2023-02-06 PROCEDURE — 99214 OFFICE O/P EST MOD 30 MIN: CPT | Performed by: OTOLARYNGOLOGY

## 2023-02-06 RX ORDER — CIPROFLOXACIN HYDROCHLORIDE 3.5 MG/ML
4 SOLUTION/ DROPS TOPICAL 2 TIMES DAILY
Qty: 5 ML | Refills: 3 | Status: SHIPPED | OUTPATIENT
Start: 2023-02-06 | End: 2023-02-10

## 2023-02-06 RX ORDER — ECHINACEA PURPUREA EXTRACT 125 MG
1 TABLET ORAL
Qty: 60 ML | Refills: 5 | Status: SHIPPED | OUTPATIENT
Start: 2023-02-06 | End: 2023-03-08

## 2023-02-06 NOTE — PATIENT INSTRUCTIONS
Nose Instructions:  -Use salt water sprays to the nose every 2-3 hours  -Use antibiotic ointment in the nose every night for 5-7 days, then switch to vaseline  -Humidify the room if possible    Ear Instructions:  -Use eardrops in the left ear as prescribed for the next 4 days

## 2023-02-06 NOTE — PROGRESS NOTES
[facility]  [address] UP VISIT      Patient Name: Chadwick Reese  Medical Record Number:  [id number]  Primary Care Physician:  Rosa Sommer MD    ChiefComplaint     Chief Complaint   Patient presents with    Ear Problem     States that he has wax in his ears- States that his ears have been leaking wax, had been using Debrox. States that he wanted the staff at the SNF to flush ears but the nurses would not. Epistaxis     States that he has been getting several nose bleeds recently. States that he has generic afrin which helped. History of Present Illness     Shay Oconnor is an 75 y.o. male previously seen for left sided headache and drainage with suspected acute otitis media by Dr. Piotr Demarco. Interval History 10/5/2020: Since last visit, states significantly improved otalgia without otorrhea; denies headache. Continues to have left-sided aural fullness and left-sided hearing loss. No vertigo, fevers, chills, night sweats. Patient is a diabetic, last A1c 6.5 on 9/20/2019. Interval history 10/15/2020: Since last visit, patient states minimal otorrhea from the left ear and resolution of pruritus. Continues to state poor hearing in the left ear. Intermittent head congestion, has been taking Excedrin which improves this. Interval history 12/15/2020: No interval pruritus, pain, otorrhea. Continues to have poor hearing in the left ear-states he is left-handed, and has difficulty using the telephone in the left ear. Interval history 3/9/2021: Patient with left ear otorrhea/pain that has been ongoing for the past two weeks. Improvement with course of Augmentin, however continued mild pain and drainage. Interval history 4/20/2021: Left ear otorrhea two weeks ago, improved with amoxicillin. No recent drainage.      Interval history 9/13/2021: Patient states throbbing pain in the left ear, ongoing for the past 2-3 weeks, with radiation down the mandible and into the temporal scalp.  4-5/10 intensity, no otorrhea or hearing loss    Interval history 6/6/2022: Patient states he has had 2 weeks of left ear pain, no drainage, pain was 8/10 last week however has decreased to 3-4/2010 today. Radiates to the scalp. No vertigo, but recent mechanical fall with 6 mm right SDH noted on 5/29/2022    Interval history 11/2/2022: Significant improvement in overall function-currently in a skilled nursing facility, has lost over 100 pounds, chest congestion significantly improved. States that he has had drainage from the ear over the last few days, no pain.      Past Medical History     Past Medical History:   Diagnosis Date    Anemia     Angina     Arthritis     CAD (coronary artery disease)     CHF (congestive heart failure) ([facility])     CKD (chronic kidney disease) stage 3, GFR 30-59 ml/min ([facility])     Clostridium difficile diarrhea 3/16/12; 2/29/12    positive stool toxin    Diabetes mellitus ([address]     Diabetic neuropathy ([facility])     feet and legs    Disease of blood and blood forming organ     Dizziness     When he moves his head/ loses his balance    GERD (gastroesophageal reflux disease)     gastric ulcer    Headache     Hearing loss     Hyperlipidemia     Hypertension     Kidney stone 2002    Refusal of blood transfusions as patient is Muslim     Sleep apnea     Tinnitus     Venous ulcer ([address]     LLE    Wound, open 1/13/2012       Past Surgical History     Past Surgical History:   Procedure Laterality Date    CARDIAC SURGERY      Dec 27 2010, triple bypass    CHOLECYSTECTOMY  9/2011    HERNIA REPAIR  age1    KNEE ARTHROCENTESIS Right 10/6/2022    RIGHT SHOULDER INTRA ARTICULAR INJECTION SITE CONFIRMED BY FLUOROSCOPY performed by Chrissie Desir MD at [facility]    OTHER SURGICAL HISTORY  11-16-11 REPAIR LOWER STERNAL INCISION, REMOVAL OF ONE STERNAL WIRE,    OTHER SURGICAL HISTORY  10/10/2014    phacoemulsification of cataract with intraocular lens implant left eye    PAIN MANAGEMENT PROCEDURE N/A 2/10/2022    MIDLINE CERVICAL SIX SEVEN EPIDURAL STEROID INJECTION SITE CONFIRMED BY FLUOROSCOPY performed by Shelli Philippe MD at [facility]    PAIN MANAGEMENT PROCEDURE N/A 2022    MIDLINE TO RIGHT CERVICAL SIX SEVEN EPIDURAL STEROID INJECTION SITE CONFIRMED BY FLUOROSCOPY performed by Shelli Philippe MD at [facility]    UPPER GASTROINTESTINAL ENDOSCOPY         Family History     Family History   Problem Relation Age of Onset    Heart Disease Mother     Heart Disease Father     Cancer Father     Diabetes Brother     Diabetes Brother        Social History     Social History     Tobacco Use    Smoking status: Former     Packs/day: 1.00     Years: 16.00     Pack years: 16.00     Types: Cigarettes     Quit date: 1/10/1988     Years since quittin.0    Smokeless tobacco: Never    Tobacco comments:     22 yrs ago   Vaping Use    Vaping Use: Never used   Substance Use Topics    Alcohol use: No     Alcohol/week: 0.0 standard drinks    Drug use: No        Allergies     Allergies   Allergen Reactions    Amitriptyline Other (See Comments)     tremor    Celecoxib      Hyperkalemia    Cymbalta [Duloxetine Hcl] Other (See Comments)     Tremors and slurred speech    Elavil [Amitriptyline Hcl]      tremor    Gabapentin      Tremor at high dose    Nsaids      Gastric ulcer       Medications     Current Outpatient Medications   Medication Sig Dispense Refill    ciprofloxacin (CILOXAN) 0.3 % ophthalmic solution Place 4 drops in ear(s) 2 times daily for 4 days 5 mL 3    sodium chloride (ALTAMIST SPRAY) 0.65 % nasal spray 1 spray by Nasal route every 2 hours (while awake) 60 mL 5    ZINC PO Take by mouth      torsemide (DEMADEX) 100 MG tablet Take 0.5 tablets by mouth daily 15 tablet 3    acetaminophen (TYLENOL) 325 MG tablet Take 650 mg by mouth every 4 hours as needed for Pain      cyclobenzaprine (FLEXERIL) 5 MG tablet Take 5 mg by mouth every 8 hours as needed for Muscle spasms      insulin glargine (LANTUS SOLOSTAR) 100 UNIT/ML injection pen Inject 40 Units into the skin daily      insulin lispro, 1 Unit Dial, (HUMALOG/ADMELOG) 100 UNIT/ML SOPN Inject into the skin 3 times daily Per sliding scale      meclizine (ANTIVERT) 25 MG tablet Take 25 mg by mouth every 8 hours as needed for Dizziness      pregabalin (LYRICA) 25 MG capsule Take 25 mg by mouth every 12 hours as needed (pain).      polyethylene glycol (GLYCOLAX) 17 GM/SCOOP powder Take 17 g by mouth daily as needed (constipation)      magnesium oxide (MAG-OX) 400 (240 Mg) MG tablet Take 1 tablet by mouth 2 times daily 30 tablet 0    spironolactone (ALDACTONE) 25 MG tablet Take 1 tablet by mouth daily 30 tablet 3    glucagon 1 MG injection Inject 1 kit into the muscle as needed (for hypoglycemia) Emergencies only      loperamide (IMODIUM) 2 MG capsule Take 2 mg by mouth as needed for Diarrhea      magnesium hydroxide (MILK OF MAGNESIA) 400 MG/5ML suspension Take 30 mLs by mouth daily as needed for Constipation      ondansetron (ZOFRAN-ODT) 4 MG disintegrating tablet Take 4 mg by mouth every 6 hours as needed for Nausea      RHOPRESSA 0.02 % SOLN Place 1 drop into the left eye nightly      dorzolamide (TRUSOPT) 2 % ophthalmic solution Place 1 drop into the left eye 2 times daily      brimonidine-timolol (COMBIGAN) 0.2-0.5 % ophthalmic solution Place 1 drop into the left eye 2 times daily      isosorbide mononitrate (IMDUR) 30 MG extended release tablet Take 1 tablet by mouth daily 30 tablet 1    carvedilol (COREG) 3.125 MG tablet Take 1 tablet by mouth 2 times daily (with meals) 60 tablet 3    linaclotide (LINZESS) 290 MCG CAPS capsule Take 1 capsule by mouth every morning (before breakfast) 30 capsule 1    tamsulosin (FLOMAX) 0.4 MG capsule Take 0.8 mg by mouth Daily      atorvastatin (LIPITOR) 80 MG tablet Take 1 tablet by mouth nightly 30 tablet 3    allopurinol (ZYLOPRIM) 300 MG tablet Take 1 tablet by  mouth daily 30 tablet 0    naloxone 4 MG/0.1ML LIQD nasal spray 1 spray by Nasal route as needed for Opioid Reversal 1 each 0    senna (SENOKOT) 8.6 MG TABS tablet Take 1 tablet by mouth 2 times daily 120 tablet 0    docusate sodium (COLACE, DULCOLAX) 100 MG CAPS Take 100 mg by mouth 2 times daily      silver sulfADIAZINE (SILVADENE) 1 % cream Apply to BL lower leg ulcers daily 20 g 0    fluticasone (FLONASE) 50 MCG/ACT nasal spray 1 spray by Each Nostril route daily as needed for Allergies      ferrous sulfate 325 (65 FE) MG tablet Take 325 mg by mouth 2 times daily       polyethyl glycol-propyl glycol 0.4-0.3 % (SYSTANE ULTRA) 0.4-0.3 % ophthalmic solution Place 1 drop into both eyes 4 times daily as needed for Dry Eyes (Patient not taking: No sig reported)      potassium chloride (KLOR-CON M) 20 MEQ extended release tablet Take 20 mEq by mouth daily (Patient not taking: No sig reported)      Sodium Phosphates (FLEET) 7-19 GM/118ML Place 1 enema rectally daily as needed (Patient not taking: No sig reported)      omeprazole (PRILOSEC) 20 MG delayed release capsule Take 20 mg by mouth 2 times daily. (Patient not taking: No sig reported)       No current facility-administered medications for this visit.        Review of Systems     REVIEW OF SYSTEMS  The following systems were reviewed and revealed the following in addition to any already discussed in the HPI:    CONSTITUTIONAL: No weight loss, no fever, no night sweats, no chills  EYES: no vision changes, no blurry vision  EARS: no changes in hearing, +otalgia  NOSE: no epistaxis, no rhinorrhea  RESPIRATORY: No difficulty breathing, no shortness of breath  CV: no chest pain, no peripheral vascular disease  HEME: No coagulation disorder, no bleeding disorder  NEURO: No TIA or stroke-like symptoms  SKIN: No new rashes in the head and neck, no recent skin cancers  MOUTH: Delete no new ulcers, no recent teeth infections  GASTROINTESTINAL: No diarrhea, stomach pain  PSYCH: No anxiety, no depression    PhysicalExam     Vitals:    02/06/23 1408   Resp: 16   Temp: 98.1 °F (36.7 °C)   TempSrc: Infrared   Weight: 227 lb (103 kg)   Height: 5' 9\" (1.753 m)       PHYSICAL EXAM  Temp 98.1 °F (36.7 °C) (Infrared)   Resp 16   Ht 5' 9\" (1.753 m)   Wt 227 lb (103 kg)   BMI 33.52 kg/m²     GENERAL: No Acute Distress, Alert and Oriented, no hoarseness  EYES: EOMI, Anti-icteric  NOSE: On anterior rhinoscopy there is no epistaxis, nasal mucosa within normal limits, no purulent drainage  EARS: Normal external appearance; on portable otomicroscopy:  -Right ear: External auditory canal without stenosis, tympanic membrane clear, no middle ear effusions or retractions  -Left ear: External auditory canal with anterior-inferior tympanic membrane perforation, 20%, central; purulence noted in the external auditory canal-this was suctioned under binocular otomicroscopy, and overall drops placed. A cottonball coated in antibiotic ointment was used to occlude the external auditory meatus  FACE: 1/6 House-Brackmann Scale, symmetric, sensation equal bilaterally  ORAL CAVITY: No masses or lesions palpated, uvula is midline, moist mucous membranes, tongue midline/mobile  NECK: Normal range of motion, no thyromegaly, trachea is midline, no lymphadenopathy, no neck masses, no crepitus  CHEST: Normal respiratory effort, no retractions, breathing comfortably  SKIN: No rashes, normal appearing skin, no evidence of skin lesions/tumors  NEURO: CN 2, 3, 4, 5, 6, 7, 11, 12 intact bilaterally     Audiogram 6/6/22:         Procedure     Nasal Endoscopy    Pre OP: Epistaxis  Post OP: Left-sided epistaxis    Verbal consent was received. After topical anesthesia and decongestion had been obtained using aerosolized 1% lidocaine and oxymetazoline, a 45 degree rigid endoscope was placed into both nares with the patient in a sitting position.  The following was observed:    Right Nasal Cavity and Paranasal Sinuses:  Polyp score = 0 (0 = no polyps, 1 = small polyps in middle meatus not reaching below the inferior border of the middle geoffrey, 2 = polyps reaching below the middle border of the middle turbinate, 3= large polyps reaching the lower border of the inferior turbinate or polyps medial to the middle geoffrey, 4= large polyps causing almost complete congestion/obstruction of the interior meatus)  Edema score = 1 (0 = absent, 1 = mild, 2 = severe)  Discharge score = 1 (0 = no discharge, 1 = clear thin discharge, 2 = thick purulent discharge)    Left Nasal Cavity and Paranasal Sinuses:    Polyp score = 0 (0 = no polyps, 1 = small polyps in middle meatus not reaching below the inferior border of the middle geoffrey, 2 = polyps reaching below the middle border of the middle turbinate, 3= large polyps reaching the lower border of the inferior turbinate or polyps medial to the middle geoffrey, 4= large polyps causing almost complete congestion/obstruction of the interior meatus)  Edema score = 1 (0 = absent, 1 = mild, 2 = severe)  Discharge score = 1 (0 = no discharge, 1 = clear thin discharge, 2 = thick purulent discharge)    Nasopharynx:     Eustachean tube orifices, Fossae of Rosenmuller and posterior nasopharyngeal wall clear without masses    Septum: intact and deviated to the right in the midportion, to the left caudally with a large left-sided caudal septal spur-there is bloody oozing from the left Kiesselbach's plexus through diffuse portion of the cartilaginous nasal septum. This was managed with Surgicel coated in antibiotic ointment, with cessation of oozing. No posterior hemorrhage, nor any bleeding from the right side with no  Other: The inferior and middle turbinates were examined. The middle meatus, and sphenoethmoid recess was examined bilaterally. Cultures were not obtained from the nasal passages. There were no complications. Tolerated well without complication.  I attest that I was present for and did the entire procedure myself. Assessment and Plan     1. Mixed conductive and sensorineural hearing loss of left ear with restricted hearing of right ear  Patient with mixed hearing loss of left ear, with significant conductive gap-likely due to left tympanic membrane perforation but may also have an element of ossicular chain discontinuity/fixation. His tympanic membrane remains perforated, and we discussed tympanoplasty with possible ossicular chain reconstruction. Patient states that once he gets his living situation stabilized he will call us if he desires surgery    2. Tympanic membrane perforation, left  Left tympanic membrane, present, appears to be improving - now only 20%    3. Left ear COME  Will treat with eardrops for 4 days, see him back on an as needed basis    4. Epistaxis  Treated with Surgicel dressing placed over the left septum under endoscopic guidance, nasal endoscopy without any posterior bleeds on the left side, no bleed from the right nasal passage however significant right mid and left caudal septal deviation with bony spur on the left side. Recommended emollients, nasal saline sprays we will see him back on an as-needed basis. No follow-ups on file. Oscar Lacey MD  [facility]  Department of Otolaryngology/Head and Neck Surgery  2/6/23    I have performed a head and neck physical exam personally or was physically present during the key or critical portions of the service. Medical Decision Making: The following items were considered in medical decision making:  Independent review of images  Review / order clinical lab tests  Review / order radiology tests  Decision to obtain old records  Review and summation of old records as accessed through [facility] (a summary of my findings in these old records: None)     Portions of this note were dictated using Dragon.  There may be linguistic errors secondary to the use of this program.

## 2023-02-07 LAB
B-TYPE NATRIURETIC PEPTIDE: 111.5 PG/ML
BASOPHILS ABSOLUTE: 0 /ΜL
BASOPHILS RELATIVE PERCENT: 0.4 %
BUN BLDV-MCNC: 108 MG/DL
CALCIUM SERPL-MCNC: 9 MG/DL
CHLORIDE BLD-SCNC: 98 MMOL/L
CO2: 28 MMOL/L
CREAT SERPL-MCNC: 1.7 MG/DL
EGFR: 40
EOSINOPHILS ABSOLUTE: 0.2 /ΜL
EOSINOPHILS RELATIVE PERCENT: 3.2 %
GLUCOSE BLD-MCNC: 205 MG/DL
HCT VFR BLD CALC: 34 % (ref 41–53)
HEMOGLOBIN: 11.4 G/DL (ref 13.5–17.5)
LYMPHOCYTES ABSOLUTE: 0.9 /ΜL
LYMPHOCYTES RELATIVE PERCENT: 14.7 %
MCH RBC QN AUTO: 29.1 PG
MCHC RBC AUTO-ENTMCNC: 33.5 G/DL
MCV RBC AUTO: 86.8 FL
MONOCYTES ABSOLUTE: 0.5 /ΜL
MONOCYTES RELATIVE PERCENT: 8 %
NEUTROPHILS ABSOLUTE: 4.7 /ΜL
NEUTROPHILS RELATIVE PERCENT: 73.7 %
PLATELET # BLD: 134 K/ΜL
PMV BLD AUTO: 9.4 FL
POTASSIUM SERPL-SCNC: 4.5 MMOL/L
RBC # BLD: 3.92 10^6/ΜL
SODIUM BLD-SCNC: 137 MMOL/L
WBC # BLD: 6.3 10^3/ML

## 2023-02-16 ENCOUNTER — TELEPHONE (OUTPATIENT)
Dept: CARDIOLOGY CLINIC | Age: 68
End: 2023-02-16

## 2023-02-16 NOTE — TELEPHONE ENCOUNTER
----- Message from PATRICK Wooten CNP sent at 2/14/2023  3:10 PM EST -----  Please get an updated med list from the nursing home faxed to us for review. Recommend holding torsemide if they haven't already. Needs weights as well.

## 2023-02-16 NOTE — TELEPHONE ENCOUNTER
[address] and asked for the pts nurse, message given, they will fax med list and weight log.

## 2023-02-20 ENCOUNTER — HOSPITAL ENCOUNTER (INPATIENT)
Age: 68
LOS: 3 days | Discharge: NURSING, REHAB OR LONG-TERM CARE | End: 2023-02-23
Attending: STUDENT IN AN ORGANIZED HEALTH CARE EDUCATION/TRAINING PROGRAM | Admitting: INTERNAL MEDICINE
Payer: MEDICARE

## 2023-02-20 ENCOUNTER — APPOINTMENT (OUTPATIENT)
Dept: GENERAL RADIOLOGY | Age: 68
End: 2023-02-20
Payer: MEDICARE

## 2023-02-20 DIAGNOSIS — I50.9 ACUTE ON CHRONIC CONGESTIVE HEART FAILURE, UNSPECIFIED HEART FAILURE TYPE (HCC): Primary | ICD-10-CM

## 2023-02-20 DIAGNOSIS — R63.5 WEIGHT GAIN: ICD-10-CM

## 2023-02-20 DIAGNOSIS — R77.8 ELEVATED TROPONIN: ICD-10-CM

## 2023-02-20 DIAGNOSIS — N18.9 CHRONIC KIDNEY DISEASE, UNSPECIFIED CKD STAGE: ICD-10-CM

## 2023-02-20 DIAGNOSIS — J96.01 ACUTE RESPIRATORY FAILURE WITH HYPOXIA (HCC): ICD-10-CM

## 2023-02-20 LAB
A/G RATIO: 1 (ref 1.1–2.2)
ALBUMIN SERPL-MCNC: 4 G/DL (ref 3.4–5)
ALP BLD-CCNC: 121 U/L (ref 40–129)
ALT SERPL-CCNC: 12 U/L (ref 10–40)
ANION GAP SERPL CALCULATED.3IONS-SCNC: 7 MMOL/L (ref 3–16)
AST SERPL-CCNC: 19 U/L (ref 15–37)
B-TYPE NATRIURETIC PEPTIDE: 212.7 PG/ML
BASE EXCESS VENOUS: 2.8 MMOL/L (ref -3–3)
BASOPHILS ABSOLUTE: 0 K/UL (ref 0–0.2)
BASOPHILS RELATIVE PERCENT: 0.6 %
BILIRUB SERPL-MCNC: 0.6 MG/DL (ref 0–1)
BILIRUBIN URINE: NEGATIVE
BLOOD, URINE: NEGATIVE
BUN BLDV-MCNC: 120 MG/DL (ref 7–20)
BUN BLDV-MCNC: 123 MG/DL
C DIFF TOXIN/ANTIGEN: NORMAL
CALCIUM SERPL-MCNC: 8.9 MG/DL (ref 8.3–10.6)
CALCIUM SERPL-MCNC: 9.4 MG/DL
CARBOXYHEMOGLOBIN: 3.9 % (ref 0–1.5)
CHLORIDE BLD-SCNC: 93 MMOL/L (ref 99–110)
CHLORIDE BLD-SCNC: 97 MMOL/L
CHP ED QC CHECK: YES
CLARITY: CLEAR
CO2: 26 MMOL/L
CO2: 31 MMOL/L (ref 21–32)
COLOR: YELLOW
CREAT SERPL-MCNC: 1.7 MG/DL (ref 0.8–1.3)
CREAT SERPL-MCNC: 1.8 MG/DL
EGFR: 38
EKG ATRIAL RATE: 53 BPM
EKG DIAGNOSIS: NORMAL
EKG P-R INTERVAL: 166 MS
EKG Q-T INTERVAL: 452 MS
EKG QRS DURATION: 84 MS
EKG QTC CALCULATION (BAZETT): 424 MS
EKG R AXIS: -54 DEGREES
EKG T AXIS: 109 DEGREES
EKG VENTRICULAR RATE: 53 BPM
EOSINOPHILS ABSOLUTE: 0.3 K/UL (ref 0–0.6)
EOSINOPHILS RELATIVE PERCENT: 4 %
ESTIMATED AVERAGE GLUCOSE: 182.9 MG/DL
GFR SERPL CREATININE-BSD FRML MDRD: 43 ML/MIN/{1.73_M2}
GLUCOSE BLD-MCNC: 125 MG/DL (ref 70–99)
GLUCOSE BLD-MCNC: 134 MG/DL
GLUCOSE BLD-MCNC: 134 MG/DL (ref 70–99)
GLUCOSE BLD-MCNC: 145 MG/DL (ref 70–99)
GLUCOSE BLD-MCNC: 231 MG/DL
GLUCOSE BLD-MCNC: 237 MG/DL (ref 70–99)
GLUCOSE URINE: NEGATIVE MG/DL
HBA1C MFR BLD: 8 %
HCO3 VENOUS: 29.2 MMOL/L (ref 23–29)
HCT VFR BLD CALC: 36.2 % (ref 40.5–52.5)
HEMOGLOBIN: 11.7 G/DL (ref 13.5–17.5)
INFLUENZA A: NOT DETECTED
INFLUENZA B: NOT DETECTED
KETONES, URINE: NEGATIVE MG/DL
LACTIC ACID: 1 MMOL/L (ref 0.4–2)
LEUKOCYTE ESTERASE, URINE: NEGATIVE
LIPASE: 24 U/L (ref 13–60)
LYMPHOCYTES ABSOLUTE: 1 K/UL (ref 1–5.1)
LYMPHOCYTES RELATIVE PERCENT: 13.7 %
MCH RBC QN AUTO: 28.2 PG (ref 26–34)
MCHC RBC AUTO-ENTMCNC: 32.3 G/DL (ref 31–36)
MCV RBC AUTO: 87.4 FL (ref 80–100)
METHEMOGLOBIN VENOUS: 0.3 %
MICROSCOPIC EXAMINATION: NORMAL
MONOCYTES ABSOLUTE: 0.5 K/UL (ref 0–1.3)
MONOCYTES RELATIVE PERCENT: 7.7 %
NEUTROPHILS ABSOLUTE: 5.2 K/UL (ref 1.7–7.7)
NEUTROPHILS RELATIVE PERCENT: 74 %
NITRITE, URINE: NEGATIVE
O2 CONTENT, VEN: 14 VOL %
O2 SAT, VEN: 75 %
O2 THERAPY: ABNORMAL
PCO2, VEN: 52.6 MMHG (ref 40–50)
PDW BLD-RTO: 17.4 % (ref 12.4–15.4)
PERFORMED ON: ABNORMAL
PH UA: 6 (ref 5–8)
PH VENOUS: 7.36 (ref 7.35–7.45)
PLATELET # BLD: 147 K/UL (ref 135–450)
PMV BLD AUTO: 9 FL (ref 5–10.5)
PO2, VEN: 41.7 MMHG (ref 25–40)
POTASSIUM REFLEX MAGNESIUM: 4.1 MMOL/L (ref 3.5–5.1)
POTASSIUM SERPL-SCNC: 3.9 MMOL/L
PRO-BNP: 2682 PG/ML (ref 0–124)
PROTEIN UA: NEGATIVE MG/DL
RBC # BLD: 4.15 M/UL (ref 4.2–5.9)
SARS-COV-2 RNA, RT PCR: NOT DETECTED
SODIUM BLD-SCNC: 131 MMOL/L (ref 136–145)
SODIUM BLD-SCNC: 137 MMOL/L
SPECIFIC GRAVITY UA: <=1.005 (ref 1–1.03)
TCO2 CALC VENOUS: 31 MMOL/L
TOTAL PROTEIN: 7.9 G/DL (ref 6.4–8.2)
TROPONIN: 0.05 NG/ML
TSH SERPL DL<=0.05 MIU/L-ACNC: 1.31 UIU/ML (ref 0.27–4.2)
URINE REFLEX TO CULTURE: NORMAL
URINE TYPE: NORMAL
UROBILINOGEN, URINE: 0.2 E.U./DL
WBC # BLD: 7 K/UL (ref 4–11)

## 2023-02-20 PROCEDURE — 87636 SARSCOV2 & INF A&B AMP PRB: CPT

## 2023-02-20 PROCEDURE — 99285 EMERGENCY DEPT VISIT HI MDM: CPT

## 2023-02-20 PROCEDURE — 93010 ELECTROCARDIOGRAM REPORT: CPT | Performed by: INTERNAL MEDICINE

## 2023-02-20 PROCEDURE — 6360000002 HC RX W HCPCS: Performed by: NURSE PRACTITIONER

## 2023-02-20 PROCEDURE — 2580000003 HC RX 258

## 2023-02-20 PROCEDURE — 96374 THER/PROPH/DIAG INJ IV PUSH: CPT

## 2023-02-20 PROCEDURE — 99223 1ST HOSP IP/OBS HIGH 75: CPT

## 2023-02-20 PROCEDURE — 6370000000 HC RX 637 (ALT 250 FOR IP)

## 2023-02-20 PROCEDURE — 87324 CLOSTRIDIUM AG IA: CPT

## 2023-02-20 PROCEDURE — 82803 BLOOD GASES ANY COMBINATION: CPT

## 2023-02-20 PROCEDURE — 83880 ASSAY OF NATRIURETIC PEPTIDE: CPT

## 2023-02-20 PROCEDURE — 2700000000 HC OXYGEN THERAPY PER DAY

## 2023-02-20 PROCEDURE — 83036 HEMOGLOBIN GLYCOSYLATED A1C: CPT

## 2023-02-20 PROCEDURE — 80053 COMPREHEN METABOLIC PANEL: CPT

## 2023-02-20 PROCEDURE — 2060000000 HC ICU INTERMEDIATE R&B

## 2023-02-20 PROCEDURE — 81003 URINALYSIS AUTO W/O SCOPE: CPT

## 2023-02-20 PROCEDURE — 84443 ASSAY THYROID STIM HORMONE: CPT

## 2023-02-20 PROCEDURE — 84484 ASSAY OF TROPONIN QUANT: CPT

## 2023-02-20 PROCEDURE — 6360000002 HC RX W HCPCS

## 2023-02-20 PROCEDURE — 83605 ASSAY OF LACTIC ACID: CPT

## 2023-02-20 PROCEDURE — 85025 COMPLETE CBC W/AUTO DIFF WBC: CPT

## 2023-02-20 PROCEDURE — 71045 X-RAY EXAM CHEST 1 VIEW: CPT

## 2023-02-20 PROCEDURE — 36415 COLL VENOUS BLD VENIPUNCTURE: CPT

## 2023-02-20 PROCEDURE — 93005 ELECTROCARDIOGRAM TRACING: CPT | Performed by: STUDENT IN AN ORGANIZED HEALTH CARE EDUCATION/TRAINING PROGRAM

## 2023-02-20 PROCEDURE — 83690 ASSAY OF LIPASE: CPT

## 2023-02-20 PROCEDURE — 87449 NOS EACH ORGANISM AG IA: CPT

## 2023-02-20 RX ORDER — SODIUM CHLORIDE 0.9 % (FLUSH) 0.9 %
5-40 SYRINGE (ML) INJECTION EVERY 12 HOURS SCHEDULED
Status: DISCONTINUED | OUTPATIENT
Start: 2023-02-20 | End: 2023-02-23 | Stop reason: HOSPADM

## 2023-02-20 RX ORDER — ATORVASTATIN CALCIUM 40 MG/1
80 TABLET, FILM COATED ORAL NIGHTLY
Status: DISCONTINUED | OUTPATIENT
Start: 2023-02-20 | End: 2023-02-23 | Stop reason: HOSPADM

## 2023-02-20 RX ORDER — ACETAMINOPHEN 325 MG/1
650 TABLET ORAL EVERY 6 HOURS PRN
Status: DISCONTINUED | OUTPATIENT
Start: 2023-02-20 | End: 2023-02-23 | Stop reason: HOSPADM

## 2023-02-20 RX ORDER — POTASSIUM CHLORIDE 20 MEQ/1
40 TABLET, EXTENDED RELEASE ORAL PRN
Status: DISCONTINUED | OUTPATIENT
Start: 2023-02-20 | End: 2023-02-23 | Stop reason: HOSPADM

## 2023-02-20 RX ORDER — MAGNESIUM SULFATE IN WATER 40 MG/ML
2000 INJECTION, SOLUTION INTRAVENOUS PRN
Status: DISCONTINUED | OUTPATIENT
Start: 2023-02-20 | End: 2023-02-23 | Stop reason: HOSPADM

## 2023-02-20 RX ORDER — TIMOLOL MALEATE 5 MG/ML
1 SOLUTION/ DROPS OPHTHALMIC 2 TIMES DAILY
Status: DISCONTINUED | OUTPATIENT
Start: 2023-02-20 | End: 2023-02-23 | Stop reason: HOSPADM

## 2023-02-20 RX ORDER — LOPERAMIDE HYDROCHLORIDE 2 MG/1
2 CAPSULE ORAL 4 TIMES DAILY PRN
Status: DISCONTINUED | OUTPATIENT
Start: 2023-02-20 | End: 2023-02-21

## 2023-02-20 RX ORDER — PREGABALIN 25 MG/1
25 CAPSULE ORAL EVERY 12 HOURS PRN
Status: DISCONTINUED | OUTPATIENT
Start: 2023-02-20 | End: 2023-02-23 | Stop reason: HOSPADM

## 2023-02-20 RX ORDER — SENNOSIDES 8.6 MG
1 TABLET ORAL 2 TIMES DAILY
Status: DISCONTINUED | OUTPATIENT
Start: 2023-02-20 | End: 2023-02-20

## 2023-02-20 RX ORDER — FERROUS SULFATE 325(65) MG
325 TABLET ORAL 2 TIMES DAILY
Status: DISCONTINUED | OUTPATIENT
Start: 2023-02-20 | End: 2023-02-23 | Stop reason: HOSPADM

## 2023-02-20 RX ORDER — INSULIN LISPRO 100 [IU]/ML
0-4 INJECTION, SOLUTION INTRAVENOUS; SUBCUTANEOUS
Status: DISCONTINUED | OUTPATIENT
Start: 2023-02-20 | End: 2023-02-22

## 2023-02-20 RX ORDER — DOCUSATE SODIUM 100 MG/1
100 CAPSULE, LIQUID FILLED ORAL 2 TIMES DAILY
Status: DISCONTINUED | OUTPATIENT
Start: 2023-02-20 | End: 2023-02-23 | Stop reason: HOSPADM

## 2023-02-20 RX ORDER — DEXTROSE MONOHYDRATE 100 MG/ML
INJECTION, SOLUTION INTRAVENOUS CONTINUOUS PRN
Status: DISCONTINUED | OUTPATIENT
Start: 2023-02-20 | End: 2023-02-23 | Stop reason: HOSPADM

## 2023-02-20 RX ORDER — POTASSIUM CHLORIDE 7.45 MG/ML
10 INJECTION INTRAVENOUS PRN
Status: DISCONTINUED | OUTPATIENT
Start: 2023-02-20 | End: 2023-02-23 | Stop reason: HOSPADM

## 2023-02-20 RX ORDER — BRIMONIDINE TARTRATE AND TIMOLOL MALEATE 2; 5 MG/ML; MG/ML
1 SOLUTION OPHTHALMIC 2 TIMES DAILY
Status: DISCONTINUED | OUTPATIENT
Start: 2023-02-20 | End: 2023-02-20 | Stop reason: CLARIF

## 2023-02-20 RX ORDER — CYCLOBENZAPRINE HCL 10 MG
5 TABLET ORAL EVERY 8 HOURS PRN
Status: DISCONTINUED | OUTPATIENT
Start: 2023-02-20 | End: 2023-02-23 | Stop reason: HOSPADM

## 2023-02-20 RX ORDER — SODIUM CHLORIDE 9 MG/ML
INJECTION, SOLUTION INTRAVENOUS PRN
Status: DISCONTINUED | OUTPATIENT
Start: 2023-02-20 | End: 2023-02-23 | Stop reason: HOSPADM

## 2023-02-20 RX ORDER — SODIUM CHLORIDE 0.9 % (FLUSH) 0.9 %
10 SYRINGE (ML) INJECTION PRN
Status: DISCONTINUED | OUTPATIENT
Start: 2023-02-20 | End: 2023-02-23 | Stop reason: HOSPADM

## 2023-02-20 RX ORDER — ACETAMINOPHEN 650 MG/1
650 SUPPOSITORY RECTAL EVERY 6 HOURS PRN
Status: DISCONTINUED | OUTPATIENT
Start: 2023-02-20 | End: 2023-02-23 | Stop reason: HOSPADM

## 2023-02-20 RX ORDER — ENOXAPARIN SODIUM 100 MG/ML
30 INJECTION SUBCUTANEOUS 2 TIMES DAILY
Status: DISCONTINUED | OUTPATIENT
Start: 2023-02-20 | End: 2023-02-23 | Stop reason: HOSPADM

## 2023-02-20 RX ORDER — SPIRONOLACTONE 25 MG/1
25 TABLET ORAL DAILY
Status: DISCONTINUED | OUTPATIENT
Start: 2023-02-20 | End: 2023-02-22

## 2023-02-20 RX ORDER — FUROSEMIDE 10 MG/ML
40 INJECTION INTRAMUSCULAR; INTRAVENOUS ONCE
Status: COMPLETED | OUTPATIENT
Start: 2023-02-20 | End: 2023-02-20

## 2023-02-20 RX ORDER — CARVEDILOL 6.25 MG/1
3.12 TABLET ORAL 2 TIMES DAILY WITH MEALS
Status: DISCONTINUED | OUTPATIENT
Start: 2023-02-20 | End: 2023-02-23 | Stop reason: HOSPADM

## 2023-02-20 RX ORDER — POLYETHYLENE GLYCOL 3350 17 G/17G
17 POWDER, FOR SOLUTION ORAL DAILY PRN
Status: DISCONTINUED | OUTPATIENT
Start: 2023-02-20 | End: 2023-02-23 | Stop reason: HOSPADM

## 2023-02-20 RX ORDER — ONDANSETRON 4 MG/1
4 TABLET, ORALLY DISINTEGRATING ORAL EVERY 8 HOURS PRN
Status: DISCONTINUED | OUTPATIENT
Start: 2023-02-20 | End: 2023-02-23 | Stop reason: HOSPADM

## 2023-02-20 RX ORDER — FUROSEMIDE 10 MG/ML
40 INJECTION INTRAMUSCULAR; INTRAVENOUS DAILY
Status: DISCONTINUED | OUTPATIENT
Start: 2023-02-21 | End: 2023-02-21

## 2023-02-20 RX ORDER — ALLOPURINOL 100 MG/1
300 TABLET ORAL DAILY
Status: DISCONTINUED | OUTPATIENT
Start: 2023-02-20 | End: 2023-02-23 | Stop reason: HOSPADM

## 2023-02-20 RX ORDER — BRIMONIDINE TARTRATE 2 MG/ML
1 SOLUTION/ DROPS OPHTHALMIC 2 TIMES DAILY
Status: DISCONTINUED | OUTPATIENT
Start: 2023-02-20 | End: 2023-02-23 | Stop reason: HOSPADM

## 2023-02-20 RX ORDER — DIMETHICONE, OXYBENZONE, AND PADIMATE O 2; 2.5; 6.6 G/100G; G/100G; G/100G
STICK TOPICAL
Status: DISCONTINUED
Start: 2023-02-20 | End: 2023-02-21 | Stop reason: ALTCHOICE

## 2023-02-20 RX ORDER — INSULIN LISPRO 100 [IU]/ML
0-4 INJECTION, SOLUTION INTRAVENOUS; SUBCUTANEOUS NIGHTLY
Status: DISCONTINUED | OUTPATIENT
Start: 2023-02-20 | End: 2023-02-22

## 2023-02-20 RX ORDER — ONDANSETRON 2 MG/ML
4 INJECTION INTRAMUSCULAR; INTRAVENOUS EVERY 6 HOURS PRN
Status: DISCONTINUED | OUTPATIENT
Start: 2023-02-20 | End: 2023-02-23 | Stop reason: HOSPADM

## 2023-02-20 RX ORDER — INSULIN GLARGINE 100 [IU]/ML
40 INJECTION, SOLUTION SUBCUTANEOUS DAILY
Status: DISCONTINUED | OUTPATIENT
Start: 2023-02-20 | End: 2023-02-23 | Stop reason: HOSPADM

## 2023-02-20 RX ADMIN — ALLOPURINOL 300 MG: 100 TABLET ORAL at 18:33

## 2023-02-20 RX ADMIN — FERROUS SULFATE TAB 325 MG (65 MG ELEMENTAL FE) 325 MG: 325 (65 FE) TAB at 18:32

## 2023-02-20 RX ADMIN — FERROUS SULFATE TAB 325 MG (65 MG ELEMENTAL FE) 325 MG: 325 (65 FE) TAB at 20:29

## 2023-02-20 RX ADMIN — ENOXAPARIN SODIUM 30 MG: 100 INJECTION SUBCUTANEOUS at 18:33

## 2023-02-20 RX ADMIN — PREGABALIN 25 MG: 25 CAPSULE ORAL at 21:17

## 2023-02-20 RX ADMIN — TIMOLOL MALEATE 1 DROP: 5 SOLUTION OPHTHALMIC at 21:19

## 2023-02-20 RX ADMIN — SPIRONOLACTONE 25 MG: 25 TABLET ORAL at 18:32

## 2023-02-20 RX ADMIN — BRIMONIDINE TARTRATE 1 DROP: 2 SOLUTION OPHTHALMIC at 21:17

## 2023-02-20 RX ADMIN — SODIUM CHLORIDE, PRESERVATIVE FREE 10 ML: 5 INJECTION INTRAVENOUS at 20:30

## 2023-02-20 RX ADMIN — FUROSEMIDE 40 MG: 10 INJECTION, SOLUTION INTRAMUSCULAR; INTRAVENOUS at 10:00

## 2023-02-20 RX ADMIN — ENOXAPARIN SODIUM 30 MG: 100 INJECTION SUBCUTANEOUS at 20:29

## 2023-02-20 RX ADMIN — ATORVASTATIN CALCIUM 80 MG: 40 TABLET, FILM COATED ORAL at 20:29

## 2023-02-20 ASSESSMENT — PAIN SCALES - GENERAL: PAINLEVEL_OUTOF10: 8

## 2023-02-20 ASSESSMENT — PAIN DESCRIPTION - LOCATION: LOCATION: GENERALIZED

## 2023-02-20 ASSESSMENT — ENCOUNTER SYMPTOMS
SHORTNESS OF BREATH: 1
DIARRHEA: 0
SORE THROAT: 0
RHINORRHEA: 0
ABDOMINAL PAIN: 0
VOMITING: 0
COLOR CHANGE: 0
NAUSEA: 0
FACIAL SWELLING: 0

## 2023-02-20 ASSESSMENT — LIFESTYLE VARIABLES
HOW MANY STANDARD DRINKS CONTAINING ALCOHOL DO YOU HAVE ON A TYPICAL DAY: PATIENT DOES NOT DRINK
HOW OFTEN DO YOU HAVE A DRINK CONTAINING ALCOHOL: NEVER

## 2023-02-20 ASSESSMENT — PAIN DESCRIPTION - DESCRIPTORS: DESCRIPTORS: DISCOMFORT

## 2023-02-20 ASSESSMENT — PAIN - FUNCTIONAL ASSESSMENT: PAIN_FUNCTIONAL_ASSESSMENT: 0-10

## 2023-02-20 ASSESSMENT — PAIN DESCRIPTION - PAIN TYPE: TYPE: CHRONIC PAIN

## 2023-02-20 ASSESSMENT — PAIN DESCRIPTION - FREQUENCY: FREQUENCY: CONTINUOUS

## 2023-02-20 NOTE — ED PROVIDER NOTES
[address] ED  EMERGENCY DEPARTMENT ENCOUNTER      I am the Primary Clinician of Record    Note started: 9:26 AM EST 2/20/23     I have seen and evaluated this patient with my supervising physician Adama Hodge DO.        Pt Name: Enma Quintero  MRN: [id number]  [place] 1955  [address] evaluation: 2/20/2023  Provider: Joby Alvarez, APRN - CNP  PCP: Gio Costa MD  ED Attending: No att. providers found      [address] [place] [address]       Chief Complaint   Patient presents with    Abnormal Lab     Pt sent in by nursing facility due to elevated BUN and Creatinine. Pt reports 6lb weight increase since lastnight. HISTORY OF PRESENTILLNESS   (Location/Symptom, Timing/Onset, Context/Setting, Quality, Duration, Modifying Factors, Severity)  Note limiting factors. Enma Quintero is a 75 y.o. male for weight gain. Onset was 1 day. Context includes patient reports that he was sent in from the nursing home for increased weight gain. Patient reports that he gained 6 pounds overnight. Patient has history of CHF and has recently been stopped off of his diuretics due to kidney function. Patient reports over the last 2 weeks he has gained 15 to 20 pounds. Patient arrives to the ED hypoxic and does not use oxygen at baseline. Alleviating factors include nothing. Aggravating factors include nothing. Pain is 8/10. Nothing has been used for pain today. Nursing Notes were all reviewed and agreed with or any disagreements were addressed  in the HPI. REVIEW OF SYSTEMS       Review of Systems   Constitutional:  Positive for unexpected weight change. Negative for activity change, appetite change and fever. HENT:  Negative for congestion, facial swelling, rhinorrhea and sore throat. Eyes:  Negative for visual disturbance. Respiratory:  Positive for shortness of breath. Cardiovascular:  Negative for chest pain.    Gastrointestinal:  Negative for abdominal pain, diarrhea, nausea and vomiting. Genitourinary:  Negative for difficulty urinating. Musculoskeletal:  Negative for arthralgias and myalgias. Skin:  Negative for color change and rash. Neurological:  Negative for dizziness and light-headedness. Psychiatric/Behavioral:  Negative for agitation. All other systems reviewed and are negative. Positives and Pertinent negatives as per HPI. Except as noted above in the ROS, all other systems were reviewed and negative.        PAST MEDICAL HISTORY     Past Medical History:   Diagnosis Date    Anemia     Angina     Arthritis     CAD (coronary artery disease)     CHF (congestive heart failure) ([facility])     CKD (chronic kidney disease) stage 3, GFR 30-59 ml/min ([facility])     Clostridium difficile diarrhea 3/16/12; 2/29/12    positive stool toxin    Diabetes mellitus ([address]     Diabetic neuropathy ([address]     feet and legs    Disease of blood and blood forming organ     Dizziness     When he moves his head/ loses his balance    GERD (gastroesophageal reflux disease)     gastric ulcer    Headache     Hearing loss     Hyperlipidemia     Hypertension     Kidney stone 2002    Refusal of blood transfusions as patient is Evangelical     Sleep apnea     Tinnitus     Venous ulcer ([address]     LLE    Wound, open 1/13/2012         SURGICAL HISTORY       Past Surgical History:   Procedure Laterality Date    CARDIAC SURGERY      Dec 27 2010, triple bypass    CHOLECYSTECTOMY  9/2011    HERNIA REPAIR  age1    KNEE ARTHROCENTESIS Right 10/6/2022    RIGHT SHOULDER INTRA ARTICULAR INJECTION SITE CONFIRMED BY FLUOROSCOPY performed by Nichole Fuentes MD at [address] [place] [address]  11-16-11 REPAIR LOWER STERNAL INCISION, REMOVAL OF ONE STERNAL WIRE,    OTHER SURGICAL HISTORY  10/10/2014    phacoemulsification of cataract with intraocular lens implant left eye    PAIN MANAGEMENT PROCEDURE N/A 2/10/2022    MIDLINE CERVICAL SIX SEVEN EPIDURAL STEROID INJECTION SITE CONFIRMED BY FLUOROSCOPY performed by Raul Almanzar MD at [facility] N/A 7/21/2022    MIDLINE TO RIGHT CERVICAL SIX SEVEN EPIDURAL STEROID INJECTION SITE CONFIRMED BY FLUOROSCOPY performed by Raul Almanzar MD at [place] [address]       Previous Medications    ACETAMINOPHEN (TYLENOL) 325 MG TABLET    Take 650 mg by mouth every 4 hours as needed for Pain    ALLOPURINOL (ZYLOPRIM) 300 MG TABLET    Take 1 tablet by mouth daily    ATORVASTATIN (LIPITOR) 80 MG TABLET    Take 1 tablet by mouth nightly    BRIMONIDINE-TIMOLOL (COMBIGAN) 0.2-0.5 % OPHTHALMIC SOLUTION    Place 1 drop into the left eye 2 times daily    CARVEDILOL (COREG) 3.125 MG TABLET    Take 1 tablet by mouth 2 times daily (with meals)    CYCLOBENZAPRINE (FLEXERIL) 5 MG TABLET    Take 5 mg by mouth every 8 hours as needed for Muscle spasms    DOCUSATE SODIUM (COLACE, DULCOLAX) 100 MG CAPS    Take 100 mg by mouth 2 times daily    DORZOLAMIDE (TRUSOPT) 2 % OPHTHALMIC SOLUTION    Place 1 drop into the left eye 2 times daily    FERROUS SULFATE 325 (65 FE) MG TABLET    Take 325 mg by mouth 2 times daily     FLUTICASONE (FLONASE) 50 MCG/ACT NASAL SPRAY    1 spray by Each Nostril route daily as needed for Allergies    GLUCAGON 1 MG INJECTION    Inject 1 kit into the muscle as needed (for hypoglycemia) Emergencies only    INSULIN GLARGINE (LANTUS SOLOSTAR) 100 UNIT/ML INJECTION PEN    Inject 40 Units into the skin daily    INSULIN LISPRO, 1 UNIT DIAL, (HUMALOG/ADMELOG) 100 UNIT/ML SOPN    Inject into the skin 3 times daily Per sliding scale    ISOSORBIDE MONONITRATE (IMDUR) 30 MG EXTENDED RELEASE TABLET    Take 1 tablet by mouth daily    LINACLOTIDE (LINZESS) 290 MCG CAPS CAPSULE    Take 1 capsule by mouth every morning (before breakfast)    LOPERAMIDE (IMODIUM) 2 MG CAPSULE    Take 2 mg by mouth as needed for Diarrhea    MAGNESIUM HYDROXIDE (MILK OF MAGNESIA) 400 MG/5ML SUSPENSION    Take 30 mLs by mouth daily as needed for Constipation    MAGNESIUM OXIDE (MAG-OX) 400 (240 MG) MG TABLET    Take 1 tablet by mouth 2 times daily    MECLIZINE (ANTIVERT) 25 MG TABLET    Take 25 mg by mouth every 8 hours as needed for Dizziness    NALOXONE 4 MG/0.1ML LIQD NASAL SPRAY    1 spray by Nasal route as needed for Opioid Reversal    OMEPRAZOLE (PRILOSEC) 20 MG DELAYED RELEASE CAPSULE    Take 20 mg by mouth 2 times daily. ONDANSETRON (ZOFRAN-ODT) 4 MG DISINTEGRATING TABLET    Take 4 mg by mouth every 6 hours as needed for Nausea    POLYETHYL GLYCOL-PROPYL GLYCOL 0.4-0.3 % (SYSTANE ULTRA) 0.4-0.3 % OPHTHALMIC SOLUTION    Place 1 drop into both eyes 4 times daily as needed for Dry Eyes    POLYETHYLENE GLYCOL (GLYCOLAX) 17 GM/SCOOP POWDER    Take 17 g by mouth daily as needed (constipation)    POTASSIUM CHLORIDE (KLOR-CON M) 20 MEQ EXTENDED RELEASE TABLET    Take 20 mEq by mouth daily    PREGABALIN (LYRICA) 25 MG CAPSULE    Take 25 mg by mouth every 12 hours as needed (pain).     RHOPRESSA 0.02 % SOLN    Place 1 drop into the left eye nightly    SENNA (SENOKOT) 8.6 MG TABS TABLET    Take 1 tablet by mouth 2 times daily    SILVER SULFADIAZINE (SILVADENE) 1 % CREAM    Apply to BL lower leg ulcers daily    SODIUM CHLORIDE (ALTAMIST SPRAY) 0.65 % NASAL SPRAY    1 spray by Nasal route every 2 hours (while awake)    SODIUM PHOSPHATES (FLEET) 7-19 GM/118ML    Place 1 enema rectally daily as needed    SPIRONOLACTONE (ALDACTONE) 25 MG TABLET    Take 1 tablet by mouth daily    TAMSULOSIN (FLOMAX) 0.4 MG CAPSULE    Take 0.8 mg by mouth Daily    TORSEMIDE (DEMADEX) 100 MG TABLET    Take 0.5 tablets by mouth daily    ZINC PO    Take by mouth         ALLERGIES     Amitriptyline, Celecoxib, Cymbalta [duloxetine hcl], Elavil [amitriptyline hcl], Gabapentin, and Nsaids      FAMILY HISTORY       Family History   Problem Relation Age of Onset    Heart Disease Mother     Heart Disease Father     Cancer Father     Diabetes Brother     Diabetes Brother           SOCIAL HISTORY       Social History     Socioeconomic History    Marital status: Single     Spouse name: None    Number of children: None    Years of education: None    Highest education level: None   Tobacco Use    Smoking status: Former     Packs/day: 1.00     Years: 16.00     Pack years: 16.00     Types: Cigarettes     Quit date: 1/10/1988     Years since quittin.1    Smokeless tobacco: Never    Tobacco comments:     22 yrs ago   Vaping Use    Vaping Use: Never used   Substance and Sexual Activity    Alcohol use: No     Alcohol/week: 0.0 standard drinks    Drug use: No    Sexual activity: Not Currently         SCREENINGS    Jigar Coma Scale  Eye Opening: Spontaneous  Best Verbal Response: Oriented  Best Motor Response: Obeys commands  Jigar Coma Scale Score: 15      CIWA Assessment  BP: (!) 108/51  Heart Rate: (!) 48          PHYSICAL EXAM     ED Triage Vitals   BP Temp Temp Source Heart Rate Resp SpO2 Height Weight   23 0836 23 0836 23 0836 23 0836 23 0836 23 0827 23 0836 23 0836   (!) 113/51 98.2 °F (36.8 °C) Oral 59 24 (!) 87 % 5' 9\" (1.753 m) 242 lb (109.8 kg)       Physical Exam  Constitutional:       Appearance: Normal appearance. He is well-developed. He is obese. HENT:      Head: Normocephalic and atraumatic. Cardiovascular:      Rate and Rhythm: Normal rate. Pulmonary:      Effort: Pulmonary effort is normal. No respiratory distress. Abdominal:      General: There is no distension. Palpations: Abdomen is soft. Tenderness: There is no abdominal tenderness. Musculoskeletal:         General: Normal range of motion. Cervical back: Normal range of motion. Skin:     General: Skin is warm and dry. Neurological:      Mental Status: He is alert and oriented to person, place, and time.        DIAGNOSTIC RESULTS   LABS:    Labs Reviewed   CBC WITH AUTO DIFFERENTIAL - Abnormal; Notable for the following components: Result Value    RBC 4.15 (*)     Hemoglobin 11.7 (*)     Hematocrit 36.2 (*)     RDW 17.4 (*)     All other components within normal limits   COMPREHENSIVE METABOLIC PANEL W/ REFLEX TO MG FOR LOW K - Abnormal; Notable for the following components:    Sodium 131 (*)     Chloride 93 (*)     Glucose 145 (*)     Creatinine 1.7 (*)     Est, Glom Filt Rate 43 (*)     Albumin/Globulin Ratio 1.0 (*)     All other components within normal limits   TROPONIN - Abnormal; Notable for the following components:    Troponin 0.05 (*)     All other components within normal limits   BRAIN NATRIURETIC PEPTIDE - Abnormal; Notable for the following components:    Pro-BNP 2,682 (*)     All other components within normal limits   BLOOD GAS, VENOUS - Abnormal; Notable for the following components:    pCO2, Angel 52.6 (*)     pO2, Angel 41.7 (*)     HCO3, Venous 29.2 (*)     Carboxyhemoglobin 3.9 (*)     All other components within normal limits   COVID-19 & INFLUENZA COMBO   C DIFF TOXIN/ANTIGEN   TSH   URINALYSIS WITH REFLEX TO CULTURE   LACTIC ACID   LIPASE   TROPONIN   TROPONIN   HEMOGLOBIN A1C   POCT GLUCOSE   POCT GLUCOSE         All other labs were within normal range or not returned as of this dictation. EKG: All EKG's are interpreted by the Emergency Department Physician who either signs or Co-signs this chart in the absence of a cardiologist.  Please see their note for interpretation of EKG. RADIOLOGY:   Non plain film images ans such as CT, ultrasound, and MRI are read by the radiologist. Plain radiographic images are visualized and preliminarily interpreted by the ED Provider with the below findings:      Chest x-ray interpreted by radiologist for  FINDINGS:   The heart is enlarged. Prior CABG. Severe perihilar opacities centrally   with scattered ground-glass airspace opacities in both lungs centrally. No   significant pleural fluid. No skeletal finding.                      Interpretation per the Radiologist below, if available at the time of this note:    XR CHEST PORTABLE   Final Result   CHF and pulmonary edema. XR CHEST PORTABLE    Result Date: 2/20/2023  EXAMINATION: ONE XRAY VIEW OF THE CHEST 2/20/2023 8:29 am COMPARISON: 10/29/2022 radiograph HISTORY: ORDERING SYSTEM PROVIDED HISTORY: weakness TECHNOLOGIST PROVIDED HISTORY: Reason for exam:->weakness FINDINGS: The heart is enlarged. Prior CABG. Severe perihilar opacities centrally with scattered ground-glass airspace opacities in both lungs centrally. No significant pleural fluid. No skeletal finding. CHF and pulmonary edema. XR CHEST PORTABLE    Result Date: 2/20/2023  EXAMINATION: ONE XRAY VIEW OF THE CHEST 2/20/2023 8:29 am COMPARISON: 10/29/2022 radiograph HISTORY: ORDERING SYSTEM PROVIDED HISTORY: weakness TECHNOLOGIST PROVIDED HISTORY: Reason for exam:->weakness FINDINGS: The heart is enlarged. Prior CABG. Severe perihilar opacities centrally with scattered ground-glass airspace opacities in both lungs centrally. No significant pleural fluid. No skeletal finding. CHF and pulmonary edema. No results found. PROCEDURES   Unless otherwise noted below, none     Procedures     CRITICAL CARE TIME   CRITICAL CARE NOTE:  There was a high probability of clinically significant life-threatening deterioration of the patient's condition requiring my urgent intervention. Total critical care time is 35 minutes. This includes vital sign monitoring, pulse oximetry monitoring, telemetry monitoring, clinical response to the IV medications, reviewing the nursing notes, consultation time, dictation/documentation time, and interpretation of the labwork.          EMERGENCYDEPARTMENT COURSE/MDM:     Vitals:    Vitals:    02/20/23 0923 02/20/23 1007 02/20/23 1032 02/20/23 1102   BP: (!) 116/44 100/65 (!) 110/53 (!) 108/51   Pulse: 52 56 51 (!) 48   Resp: 17 18 11 10   Temp:       TempSrc:       SpO2: 99% 98% 96%    Weight:       Height: Patient was seen and evaluated by myself and Dr. Julienne Telles. Patient here for concerns for increased weight gain. Patient reports he has a history of CHF and he is recently stopped all of his diuretics due to kidney function issues. Patient presents to the ED today for complaints of shortness of breath and weight gain. Patient reports that he has gained 15 to 20 pounds in the last 2 weeks. Patient was sent in from the nursing home for a 6 pound weight gain overnight. On exam he is awake and alert. Patient was found to be hypoxic on arrival to the ED. He was placed on 4 L of nasal cannula oxygen and had improved oxygen saturations. Lab values have been reviewed and interpreted. Patient was given Lasix in the ED. Chest x-ray and EKG have also been reviewed. Patient's labs revealed chronic kidney disease and elevated BNP. Patient was given 40 mg of Lasix in the ED. Consult was placed to the hospitalist for admission. Hospitalist has accepted. Patient's care was transferred to the inpatient unit.       Patient was given the following medications:  Medications   sodium chloride flush 0.9 % injection 5-40 mL (has no administration in time range)   sodium chloride flush 0.9 % injection 10 mL (has no administration in time range)   0.9 % sodium chloride infusion (has no administration in time range)   ondansetron (ZOFRAN-ODT) disintegrating tablet 4 mg (has no administration in time range)     Or   ondansetron (ZOFRAN) injection 4 mg (has no administration in time range)   polyethylene glycol (GLYCOLAX) packet 17 g (has no administration in time range)   acetaminophen (TYLENOL) tablet 650 mg (has no administration in time range)     Or   acetaminophen (TYLENOL) suppository 650 mg (has no administration in time range)   enoxaparin Sodium (LOVENOX) injection 30 mg (has no administration in time range)   potassium chloride (KLOR-CON M) extended release tablet 40 mEq (has no administration in time range) Or   potassium bicarb-citric acid (EFFER-K) effervescent tablet 40 mEq (has no administration in time range)     Or   potassium chloride 10 mEq/100 mL IVPB (Peripheral Line) (has no administration in time range)   magnesium sulfate 2000 mg in 50 mL IVPB premix (has no administration in time range)   glucose chewable tablet 16 g (has no administration in time range)   dextrose bolus 10% 125 mL (has no administration in time range)     Or   dextrose bolus 10% 250 mL (has no administration in time range)   glucagon (rDNA) injection 1 mg (has no administration in time range)   dextrose 10 % infusion (has no administration in time range)   insulin lispro (HUMALOG) injection vial 0-4 Units (has no administration in time range)   insulin lispro (HUMALOG) injection vial 0-4 Units (has no administration in time range)   allopurinol (ZYLOPRIM) tablet 300 mg (has no administration in time range)   atorvastatin (LIPITOR) tablet 80 mg (has no administration in time range)   carvedilol (COREG) tablet 3.125 mg ( Oral Automatically Held 2/23/23 1700)   cyclobenzaprine (FLEXERIL) tablet 5 mg (has no administration in time range)   docusate sodium (COLACE) capsule 100 mg ( Oral Automatically Held 2/23/23 2100)   ferrous sulfate (IRON 325) tablet 325 mg (has no administration in time range)   insulin glargine (LANTUS) injection vial 40 Units (has no administration in time range)   linaclotide (LINZESS) capsule 290 mcg (has no administration in time range)   pregabalin (LYRICA) capsule 25 mg (has no administration in time range)   Netarsudil Dimesylate 0.02 % SOLN 1 drop (has no administration in time range)   spironolactone (ALDACTONE) tablet 25 mg (has no administration in time range)   furosemide (LASIX) injection 40 mg (has no administration in time range)   brimonidine (ALPHAGAN) 0.2 % ophthalmic solution 1 drop (has no administration in time range)   timolol (TIMOPTIC) 0.5 % ophthalmic solution 1 drop (has no administration in time range)   furosemide (LASIX) injection 40 mg (40 mg IntraVENous Given 2/20/23 1000)            CONSULTS:  [facility] NURSE/COORDINATOR  IP CONSULT TO CARDIOLOGY  IP CONSULT TO NEPHROLOGY      Discussion with other professionals -hospitalist    Social determinants - none    Records Reviewed - none    History from - patient    Limitations to history- none    Chronic conditions: has a past medical history of Anemia, Angina, Arthritis, CAD (coronary artery disease), CHF (congestive heart failure) ([address] CKD (chronic kidney disease) stage 3, GFR 30-59 ml/min ([address] Clostridium difficile diarrhea (3/16/12; 2/29/12), Diabetes mellitus ([address] Diabetic neuropathy ([facility] Disease of blood and blood forming organ, Dizziness, GERD (gastroesophageal reflux disease), Headache, Hearing loss, Hyperlipidemia, Hypertension, Kidney stone (2002), Refusal of blood transfusions as patient is Gnosticist, Sleep apnea, Tinnitus, Venous ulcer ([facility] and Wound, open (1/13/2012). Is this patient to be included in the SEP-1 Core Measure due to severe sepsis or septic shock? No   Exclusion criteria - the patient is NOT to be included for SEP-1 Core Measure due to:  2+ SIRS criteria are not met         The patient tolerated their visit well. I have evaluated this patient. My supervising physician was available for consultation. The patient and / or the family were informed of the results of any tests, a time was given to answer questions, a plan was proposed and they agreed with plan. FINAL IMPRESSION      1. Acute on chronic congestive heart failure, unspecified heart failure type ([address]    2. Weight gain    3. Acute respiratory failure with hypoxia (HCC)    4. Chronic kidney disease, unspecified CKD stage    5. Elevated troponin          DISPOSITION/PLAN   DISPOSITION Admitted 02/20/2023 10:45:36 AM      PATIENT REFERRED TO:  No follow-up provider specified.     DISCHARGE MEDICATIONS:  New Prescriptions    No medications on file       DISCONTINUED MEDICATIONS:  Discontinued Medications    No medications on file              (Please note that portions of this note were completed with a voice recognition program.  Efforts were made to edit the dictations but occasionally words are mis-transcribed.)    Patient was seen during the time of the [place] pandemic. N95 and appropriate PPE was worn during the visit.       PATRICK Will CNP (electronically signed)        PATRICK Will CNP  02/20/23 [facility] PATRICK Wilkins CNP  02/20/23 122

## 2023-02-20 NOTE — PLAN OF CARE
Hx of CHF, has had weight gain 15-20 lb   Hypoxic, requiring 4 L   Recently stopped taking diuretics   Elevated BNP and trop   CXR with pulm edema   Cards and nephro consulted     IV lasix 40 in ED       PCU     [facility] AT [place], [place]  02/20/23  11:32 AM

## 2023-02-20 NOTE — H&P
Hospital Medicine History & Physical      PCP: Otoniel Johnson MD    Date of Admission: 2/20/2023    Date of Service: Pt seen/examined on 02/20/23       Chief Complaint:    Chief Complaint   Patient presents with    Abnormal Lab     Pt sent in by nursing facility due to elevated BUN and Creatinine. Pt reports 6lb weight increase since lastnight. History Of Present Illness: The patient is a 75 y.o. male with PMHx as below who presented to [facility] ED from NH with complaint of lethargy. Patient reports having blood work done at [facility], which showed elevated BUN and creatinine. He states he has had progressive lethargy over the last few days and had a 6 lb weight gain overnight. To his knowledge, he had been taking all his medications as prescribed, however, per ED note, patient stopped on diuretics for decline in kidney function. Denies fever, chills, nausea, vomiting, diarrhea, constipation, abdominal pain, chest pain, cough, or SOB.     Past Medical History:        Diagnosis Date    Anemia     Angina     Arthritis     CAD (coronary artery disease)     CHF (congestive heart failure) ([facility])     CKD (chronic kidney disease) stage 3, GFR 30-59 ml/min ([facility])     Clostridium difficile diarrhea 3/16/12; 2/29/12    positive stool toxin    Diabetes mellitus ([address]     Diabetic neuropathy ([address]     feet and legs    Disease of blood and blood forming organ     Dizziness     When he moves his head/ loses his balance    GERD (gastroesophageal reflux disease)     gastric ulcer    Headache     Hearing loss     Hyperlipidemia     Hypertension     Kidney stone 2002    Refusal of blood transfusions as patient is [place]     Sleep apnea     Tinnitus     Venous ulcer ([address]     LLE    Wound, open 1/13/2012       Past Surgical History:        Procedure Laterality Date    CARDIAC SURGERY      Dec 27 2010, triple bypass    CHOLECYSTECTOMY  9/2011    HERNIA REPAIR  age1    KNEE ARTHROCENTESIS Right 10/6/2022    RIGHT SHOULDER INTRA ARTICULAR INJECTION SITE CONFIRMED BY FLUOROSCOPY performed by Adonay Muniz MD at [facility] OR    OTHER SURGICAL HISTORY  11-16-11 REPAIR LOWER STERNAL INCISION, REMOVAL OF ONE STERNAL WIRE,    OTHER SURGICAL HISTORY  10/10/2014    phacoemulsification of cataract with intraocular lens implant left eye    PAIN MANAGEMENT PROCEDURE N/A 2/10/2022    MIDLINE CERVICAL SIX SEVEN EPIDURAL STEROID INJECTION SITE CONFIRMED BY FLUOROSCOPY performed by Adonay Muniz MD at [facility] N/A 7/21/2022    MIDLINE TO RIGHT CERVICAL SIX SEVEN EPIDURAL STEROID INJECTION SITE CONFIRMED BY FLUOROSCOPY performed by Adonay Muniz MD at [address] ENDOSCOPY         Medications Prior to Admission:    Prior to Admission medications    Medication Sig Start Date End Date Taking? Authorizing Provider   sodium chloride (ALTAMIST SPRAY) 0.65 % nasal spray 1 spray by Nasal route every 2 hours (while awake) 2/6/23 3/8/23  Paz Harvey MD   ZINC PO Take by mouth    Historical Provider, MD   torsemide (DEMADEX) 100 MG tablet Take 0.5 tablets by mouth daily 10/26/22   PATRICK Ceja CNP   acetaminophen (TYLENOL) 325 MG tablet Take 650 mg by mouth every 4 hours as needed for Pain    Historical Provider, MD   cyclobenzaprine (FLEXERIL) 5 MG tablet Take 5 mg by mouth every 8 hours as needed for Muscle spasms    Historical Provider, MD   insulin glargine (LANTUS SOLOSTAR) 100 UNIT/ML injection pen Inject 40 Units into the skin daily    Historical Provider, MD   insulin lispro, 1 Unit Dial, (HUMALOG/ADMELOG) 100 UNIT/ML SOPN Inject into the skin 3 times daily Per sliding scale    Historical Provider, MD   meclizine (ANTIVERT) 25 MG tablet Take 25 mg by mouth every 8 hours as needed for Dizziness    Historical Provider, MD   pregabalin (LYRICA) 25 MG capsule Take 25 mg by mouth every 12 hours as needed (pain).     Historical Provider, MD   polyethyl glycol-propyl glycol 0.4-0.3 % (SYSTANE ULTRA) 0.4-0.3 % ophthalmic solution Place 1 drop into both eyes 4 times daily as needed for Dry Eyes  Patient not taking: No sig reported    Historical Provider, MD   polyethylene glycol (GLYCOLAX) 17 GM/SCOOP powder Take 17 g by mouth daily as needed (constipation)    Historical Provider, MD   magnesium oxide (MAG-OX) 400 (240 Mg) MG tablet Take 1 tablet by mouth 2 times daily 9/1/22   EYAD Joe   spironolactone (ALDACTONE) 25 MG tablet Take 1 tablet by mouth daily 9/2/22   EYAD Joe   potassium chloride (KLOR-CON M) 20 MEQ extended release tablet Take 20 mEq by mouth daily  Patient not taking: No sig reported 7/29/22   Historical Provider, MD   Sodium Phosphates (FLEET) 7-19 GM/118ML Place 1 enema rectally daily as needed  Patient not taking: No sig reported    Historical Provider, MD   glucagon 1 MG injection Inject 1 kit into the muscle as needed (for hypoglycemia) Emergencies only    Historical Provider, MD   loperamide (IMODIUM) 2 MG capsule Take 2 mg by mouth as needed for Diarrhea    Historical Provider, MD   magnesium hydroxide (MILK OF MAGNESIA) 400 MG/5ML suspension Take 30 mLs by mouth daily as needed for Constipation    Historical Provider, MD   ondansetron (ZOFRAN-ODT) 4 MG disintegrating tablet Take 4 mg by mouth every 6 hours as needed for Nausea 5/16/22   Historical Provider, MD   RHOPRESSA 0.02 % SOLN Place 1 drop into the left eye nightly 5/19/22   Historical Provider, MD   dorzolamide (TRUSOPT) 2 % ophthalmic solution Place 1 drop into the left eye 2 times daily 5/21/22   Historical Provider, MD   brimonidine-timolol (COMBIGAN) 0.2-0.5 % ophthalmic solution Place 1 drop into the left eye 2 times daily 5/20/22   Historical Provider, MD   isosorbide mononitrate (IMDUR) 30 MG extended release tablet Take 1 tablet by mouth daily 5/26/22   PATRICK Iqbal - CNP   carvedilol (COREG) 3.125 MG tablet Take 1 tablet by mouth 2 times daily (with meals) 4/9/22 Jordan Church MD   linaclotide [facility]) 290 MCG CAPS capsule Take 1 capsule by mouth every morning (before breakfast) 4/10/22   Jordan Church MD   tamsulosin [facility]) 0.4 MG capsule Take 0.8 mg by mouth Daily 12/30/21   Historical Provider, MD   atorvastatin (LIPITOR) 80 MG tablet Take 1 tablet by mouth nightly 3/14/22   PATRICK Martinez CNP   allopurinol (ZYLOPRIM) 300 MG tablet Take 1 tablet by mouth daily 3/15/22   Min Guallpa MD   naloxone 4 MG/0.1ML LIQD nasal spray 1 spray by Nasal route as needed for Opioid Reversal 11/23/21   Sil Romero MD   senna (SENOKOT) 8.6 MG TABS tablet Take 1 tablet by mouth 2 times daily 7/8/19   PATRICK Medeiros CNP   docusate sodium (COLACE, DULCOLAX) 100 MG CAPS Take 100 mg by mouth 2 times daily 5/1/18   PATRICK Lainez CNP   silver sulfADIAZINE (SILVADENE) 1 % cream Apply to BL lower leg ulcers daily 2/9/16   Iliana Islas MD   nitroGLYCERIN (NITROSTAT) 0.4 MG SL tablet Place 1 tablet under the tongue every 5 minutes as needed  Patient not taking: No sig reported 8/17/15 9/1/22  PATRICK Ann CNP   fluticasone (FLONASE) 50 MCG/ACT nasal spray 1 spray by Each Nostril route daily as needed for Allergies    Historical Provider, MD   ferrous sulfate 325 (65 FE) MG tablet Take 325 mg by mouth 2 times daily  12/22/10   Historical Provider, MD   omeprazole (PRILOSEC) 20 MG delayed release capsule Take 20 mg by mouth 2 times daily. Patient not taking: No sig reported 12/22/10   Historical Provider, MD       Allergies:  Amitriptyline, Celecoxib, Cymbalta [duloxetine hcl], Elavil [amitriptyline hcl], Gabapentin, and Nsaids    Social History:  The patient currently lives at [address] [place] [address] [place]:   reports that he quit smoking about 35 years ago. His smoking use included cigarettes. He has a 16.00 pack-year smoking history. He has never used smokeless tobacco.  ETOH:   reports no history of alcohol use.       Family History:   Positive as follows:        Problem Relation Age of Onset    Heart Disease Mother     Heart Disease Father     Cancer Father     Diabetes Brother     Diabetes Brother        REVIEW OF SYSTEMS:     Constitutional: Negative for fever   HENT: Negative for sore throat   Eyes: Negative for redness   Respiratory: Negative  for dyspnea, cough   Cardiovascular: Negative for chest pain   Gastrointestinal: Negative for vomiting, diarrhea   Genitourinary: Negative for hematuria   Musculoskeletal: Negative for arthralgias   Skin: Negative for rash   Neurological: Negative for syncope   Hematological: Negative for adenopathy   Psychiatric/Behavorial: Negative for anxiety    PHYSICAL EXAM:    BP (!) 103/52   Pulse 55   Temp 98.2 °F (36.8 °C) (Oral)   Resp 12   Ht 5' 9\" (1.753 m)   Wt 242 lb (109.8 kg)   SpO2 100%   BMI 35.74 kg/m²   Gen: No distress. Alert. Obese. Eyes: PERRL. No sclera icterus. No conjunctival injection. Neck: No JVD. No Carotid Bruit. Trachea midline. Resp: No accessory muscle use. No crackles. No wheezes. No rhonchi. +Overall reduced breath sounds   CV: Bradycardic rate. Regular rhythm. No murmur. No rub. +BLE edema  Peripheral Pulses: +2 palpable, equal bilaterally   GI: Non-tender. Non-distended. No masses. No organomegaly. Normal bowel sounds. No hernia. Skin: Warm and dry. No nodule on exposed extremities. No rash on exposed extremities. M/S: No cyanosis. No joint deformity. No clubbing. Neuro: Awake. Grossly nonfocal    Psych: Oriented x 3. No anxiety or agitation. CBC:   Recent Labs     02/20/23  0907   WBC 7.0   HGB 11.7*   HCT 36.2*   MCV 87.4        BMP:   Recent Labs     02/20/23  0907   *   K 4.1   CL 93*   CO2 31   *   CREATININE 1.7*     LIVER PROFILE:   Recent Labs     02/20/23  0907   AST 19   ALT 12   LIPASE 24.0   BILITOT 0.6   ALKPHOS 121     PT/INR: No results for input(s): PROTIME, INR in the last 72 hours. APTT: No results for input(s):  APTT in the last 72 hours.  UA:  Recent Labs     02/20/23  0907   COLORU Yellow   PHUR 6.0   CLARITYU Clear   SPECGRAV <=1.005   LEUKOCYTESUR Negative   UROBILINOGEN 0.2   BILIRUBINUR Negative   BLOODU Negative   GLUCOSEU Negative          CARDIAC ENZYMES  Recent Labs     02/20/23  0907 02/20/23  1255   TROPONINI 0.05* 0.05*       U/A:    Lab Results   Component Value Date/Time    NITRITE neg 09/01/2012 12:41 PM    COLORU Yellow 02/20/2023 09:07 AM    WBCUA 0-2 11/05/2013 10:31 AM    RBCUA 0-2 11/05/2013 10:31 AM    MUCUS 2+ 02/29/2012 03:17 PM    BACTERIA Rare 11/05/2013 10:31 AM    CLARITYU Clear 02/20/2023 09:07 AM    SPECGRAV <=1.005 02/20/2023 09:07 AM    LEUKOCYTESUR Negative 02/20/2023 09:07 AM    BLOODU Negative 02/20/2023 09:07 AM    GLUCOSEU Negative 02/20/2023 09:07 AM    GLUCOSEU NEGATIVE 02/29/2012 03:17 PM    AMORPHOUS Rare 08/02/2013 09:07 AM       ABG    Lab Results   Component Value Date/Time    IXW6SFF 27.3 01/11/2012 12:50 AM    BEART 2.4 01/11/2012 12:50 AM    B2KFCXKR 95.4 01/11/2012 12:50 AM    PHART 7.416 01/11/2012 12:50 AM    THGBART 10.5 01/11/2012 12:50 AM    ARF3PWE 43.4 01/11/2012 12:50 AM    PO2ART 76.5 01/11/2012 12:50 AM    DHM0ZZB 28.6 01/11/2012 12:50 AM    JYG1SSJ 26 12/28/2010 06:36 AM       CULTURES  Covid/influenza not detected       EKG:    Sinus bradycardiaLeft axis deviationSeptal infarct (cited on or before 29-OCT-2022)Abnormal ECGWhen compared with ECG of 29-OCT-2022 15:48,Sinus rhythm has replaced Atrial fibrillationQuestionable change in initial forces of Septal leads    RADIOLOGY  XR CHEST PORTABLE   Final Result   CHF and pulmonary edema. ECHO 8/24/2022   Summary   Limited visualization due to body habitus. Definity® used for myocardial   border enhancement. Left ventricular systolic function is low normal with a visually estimated   ejection fraction of 50-55%. The left ventricle is normal in size with mild concentric hypertrophy.    The interventricular septum during systole and diastole is flattened. This   is consistent with right ventricular pressure and volume overload. Grade III diastolic dysfunction with elevated LV pressure. There is no evidence of a left ventricular thrombus. The right ventricle is mildly dilated. Right ventricular systolic function is reduced. The left atrium appears severely enlarged. The right atrium is mildly enlarged. The aortic valve appears functionally bicuspid with severely calcified and   fused left and non-coronary cusps. Moderate aortic stenosis and regurgitation. Mild mitral, pulmonic, and tricuspid regurgitation. Systolic pulmonary artery pressure (SPAP) is elevated and estimated at 81   mmHg (right atrial pressure 15 mmHg) consistent with severe pulmonary   hypertension. The IVC is dilated in size (>2.1 cm) and collapses <50% with respiration   consistent with markedly elevated right atrial pressures (15 mmHg)       ASSESSMENT/PLAN:  #Acute on chronic diastolic CHF  #Hypoxia  -reportedly recently taken off diuretics 2/2 decline in kidney function  -newly requiring 4L O2, no supplemental O2 needs at baseline  -BNP >2k  -CXR shows pulmonary edema   -last echo as above, EF 50-55%  -lasix 40 daily   -low Na diet, strict I&Os, daily weights  -repeat echo pending   -cardiology consulted    #Bradycardia  -HR 40-50s  -holding coreg   -cardiology consulted     #MARGARITO on CKD  -Cr 1.7,   -nephrology consulted    #Diarrhea  -3 episodes diarrhea this AM after drinking shake  -c diff negative   -imodium prn       #HLD  #CAD, s/p CABG  -chronically elevated troponin, trending   -EKG without acute ischemic change  -holding beta blocker, continue statin     #HTN  -now with softer pressures  -holding coreg      #Type II DM  -lantus 40 daily + SSI  -carb control diet  -monitor POC glucose    #Anemia  -chronic, stable  -continue iron supplement      DVT Prophylaxis: Lovenox   Diet: ADULT DIET; Regular;  No Added Salt (3-4 gm); 1800 ml  Code Status: Janene Tucker PA-C  2/20/2023  2:22 PM

## 2023-02-20 NOTE — DISCHARGE INSTRUCTIONS
Heart Failure Resources:    Heart Failure Interactive Workbook:   Go to [email or web address] for a Free Heart Failure Interactive Workbook provided by [employer]. This interactive workbook will provide information on Healthier Living with Heart Failure. Please copy and paste link into search bar. Use your mouse to scroll through the pages. HF [place] carmina:   Heart Failure Free smart phone carmina available for iPhone and Android download. Use your phone to track sodium intake, fluid intake, symptoms, and weight. Low Sodium Diet:  Go to www. [employer]. org website for Branch which is Low Sodium! [employer] is a dialysis company, but this website offers free seasonal cookbooks. Each quarter, they will release 25-30 new recipes with a breakdown of calories, sodium, and glucose. Recipes:   Go to [email or web address]/recipes website for free recipes.                Please get repeat BMP every 2 weeks

## 2023-02-21 ENCOUNTER — APPOINTMENT (OUTPATIENT)
Dept: ULTRASOUND IMAGING | Age: 68
End: 2023-02-21
Payer: MEDICARE

## 2023-02-21 LAB
ANION GAP SERPL CALCULATED.3IONS-SCNC: 13 MMOL/L (ref 3–16)
APTT: 49.4 SEC (ref 23–34.3)
BASOPHILS ABSOLUTE: 0 K/UL (ref 0–0.2)
BASOPHILS RELATIVE PERCENT: 0.5 %
BUN BLDV-MCNC: 109 MG/DL (ref 7–20)
CALCIUM SERPL-MCNC: 8.8 MG/DL (ref 8.3–10.6)
CHLORIDE BLD-SCNC: 100 MMOL/L (ref 99–110)
CHOLESTEROL, TOTAL: 90 MG/DL (ref 0–199)
CO2: 27 MMOL/L (ref 21–32)
CREAT SERPL-MCNC: 1.3 MG/DL (ref 0.8–1.3)
EOSINOPHILS ABSOLUTE: 0.2 K/UL (ref 0–0.6)
EOSINOPHILS RELATIVE PERCENT: 4.1 %
GFR SERPL CREATININE-BSD FRML MDRD: 59 ML/MIN/{1.73_M2}
GLUCOSE BLD-MCNC: 138 MG/DL (ref 70–99)
GLUCOSE BLD-MCNC: 142 MG/DL (ref 70–99)
GLUCOSE BLD-MCNC: 249 MG/DL (ref 70–99)
GLUCOSE BLD-MCNC: 258 MG/DL (ref 70–99)
GLUCOSE BLD-MCNC: 283 MG/DL (ref 70–99)
HCT VFR BLD CALC: 36.1 % (ref 40.5–52.5)
HDLC SERPL-MCNC: 30 MG/DL (ref 40–60)
HEMOGLOBIN: 12 G/DL (ref 13.5–17.5)
INR BLD: 1.33 (ref 0.87–1.14)
LDL CHOLESTEROL CALCULATED: 38 MG/DL
LYMPHOCYTES ABSOLUTE: 0.8 K/UL (ref 1–5.1)
LYMPHOCYTES RELATIVE PERCENT: 14.3 %
MCH RBC QN AUTO: 28.9 PG (ref 26–34)
MCHC RBC AUTO-ENTMCNC: 33.4 G/DL (ref 31–36)
MCV RBC AUTO: 86.6 FL (ref 80–100)
MONOCYTES ABSOLUTE: 0.5 K/UL (ref 0–1.3)
MONOCYTES RELATIVE PERCENT: 8.2 %
NEUTROPHILS ABSOLUTE: 4.3 K/UL (ref 1.7–7.7)
NEUTROPHILS RELATIVE PERCENT: 72.9 %
PDW BLD-RTO: 17.3 % (ref 12.4–15.4)
PERFORMED ON: ABNORMAL
PLATELET # BLD: 152 K/UL (ref 135–450)
PMV BLD AUTO: 9.5 FL (ref 5–10.5)
POTASSIUM REFLEX MAGNESIUM: 4 MMOL/L (ref 3.5–5.1)
PROTHROMBIN TIME: 16.3 SEC (ref 11.7–14.5)
RBC # BLD: 4.16 M/UL (ref 4.2–5.9)
SODIUM BLD-SCNC: 140 MMOL/L (ref 136–145)
TRIGL SERPL-MCNC: 110 MG/DL (ref 0–150)
VLDLC SERPL CALC-MCNC: 22 MG/DL
WBC # BLD: 5.9 K/UL (ref 4–11)

## 2023-02-21 PROCEDURE — 6370000000 HC RX 637 (ALT 250 FOR IP)

## 2023-02-21 PROCEDURE — 76705 ECHO EXAM OF ABDOMEN: CPT

## 2023-02-21 PROCEDURE — 85025 COMPLETE CBC W/AUTO DIFF WBC: CPT

## 2023-02-21 PROCEDURE — 2060000000 HC ICU INTERMEDIATE R&B

## 2023-02-21 PROCEDURE — 99223 1ST HOSP IP/OBS HIGH 75: CPT | Performed by: INTERNAL MEDICINE

## 2023-02-21 PROCEDURE — 6360000002 HC RX W HCPCS

## 2023-02-21 PROCEDURE — 85610 PROTHROMBIN TIME: CPT

## 2023-02-21 PROCEDURE — 6370000000 HC RX 637 (ALT 250 FOR IP): Performed by: INTERNAL MEDICINE

## 2023-02-21 PROCEDURE — 80048 BASIC METABOLIC PNL TOTAL CA: CPT

## 2023-02-21 PROCEDURE — 99233 SBSQ HOSP IP/OBS HIGH 50: CPT

## 2023-02-21 PROCEDURE — 36415 COLL VENOUS BLD VENIPUNCTURE: CPT

## 2023-02-21 PROCEDURE — 2580000003 HC RX 258

## 2023-02-21 PROCEDURE — 80061 LIPID PANEL: CPT

## 2023-02-21 PROCEDURE — 6360000002 HC RX W HCPCS: Performed by: INTERNAL MEDICINE

## 2023-02-21 PROCEDURE — 85730 THROMBOPLASTIN TIME PARTIAL: CPT

## 2023-02-21 RX ORDER — FUROSEMIDE 10 MG/ML
40 INJECTION INTRAMUSCULAR; INTRAVENOUS 2 TIMES DAILY
Status: DISCONTINUED | OUTPATIENT
Start: 2023-02-21 | End: 2023-02-21

## 2023-02-21 RX ORDER — FUROSEMIDE 10 MG/ML
80 INJECTION INTRAMUSCULAR; INTRAVENOUS 2 TIMES DAILY
Status: DISCONTINUED | OUTPATIENT
Start: 2023-02-21 | End: 2023-02-22

## 2023-02-21 RX ORDER — PANTOPRAZOLE SODIUM 40 MG/1
40 TABLET, DELAYED RELEASE ORAL DAILY
COMMUNITY

## 2023-02-21 RX ORDER — OXYCODONE HYDROCHLORIDE AND ACETAMINOPHEN 5; 325 MG/1; MG/1
1 TABLET ORAL EVERY 6 HOURS PRN
COMMUNITY

## 2023-02-21 RX ORDER — METOLAZONE 2.5 MG/1
5 TABLET ORAL ONCE
Status: COMPLETED | OUTPATIENT
Start: 2023-02-21 | End: 2023-02-21

## 2023-02-21 RX ORDER — ASCORBIC ACID 500 MG
500 TABLET ORAL DAILY
COMMUNITY

## 2023-02-21 RX ORDER — NICOTINE POLACRILEX 4 MG
15 LOZENGE BUCCAL SEE ADMIN INSTRUCTIONS
COMMUNITY

## 2023-02-21 RX ORDER — GUAIFENESIN 600 MG/1
600 TABLET, EXTENDED RELEASE ORAL 2 TIMES DAILY
COMMUNITY

## 2023-02-21 RX ORDER — MAG HYDROX/ALUMINUM HYD/SIMETH 400-400-40
1 SUSPENSION, ORAL (FINAL DOSE FORM) ORAL
COMMUNITY

## 2023-02-21 RX ORDER — OXYMETAZOLINE HYDROCHLORIDE 0.05 G/100ML
2 SPRAY NASAL 2 TIMES DAILY PRN
Status: DISPENSED | OUTPATIENT
Start: 2023-02-21 | End: 2023-02-22

## 2023-02-21 RX ORDER — METOLAZONE 5 MG/1
5 TABLET ORAL WEEKLY
Status: ON HOLD | COMMUNITY
End: 2023-02-23 | Stop reason: SDUPTHER

## 2023-02-21 RX ADMIN — FUROSEMIDE 40 MG: 10 INJECTION, SOLUTION INTRAMUSCULAR; INTRAVENOUS at 08:39

## 2023-02-21 RX ADMIN — CYCLOBENZAPRINE 5 MG: 10 TABLET, FILM COATED ORAL at 23:21

## 2023-02-21 RX ADMIN — SPIRONOLACTONE 25 MG: 25 TABLET ORAL at 08:46

## 2023-02-21 RX ADMIN — PREGABALIN 25 MG: 25 CAPSULE ORAL at 23:21

## 2023-02-21 RX ADMIN — INSULIN GLARGINE 40 UNITS: 100 INJECTION, SOLUTION SUBCUTANEOUS at 08:40

## 2023-02-21 RX ADMIN — ENOXAPARIN SODIUM 30 MG: 100 INJECTION SUBCUTANEOUS at 08:39

## 2023-02-21 RX ADMIN — BRIMONIDINE TARTRATE 1 DROP: 2 SOLUTION OPHTHALMIC at 20:57

## 2023-02-21 RX ADMIN — INSULIN LISPRO 1 UNITS: 100 INJECTION, SOLUTION INTRAVENOUS; SUBCUTANEOUS at 12:05

## 2023-02-21 RX ADMIN — FERROUS SULFATE TAB 325 MG (65 MG ELEMENTAL FE) 325 MG: 325 (65 FE) TAB at 08:40

## 2023-02-21 RX ADMIN — FERROUS SULFATE TAB 325 MG (65 MG ELEMENTAL FE) 325 MG: 325 (65 FE) TAB at 20:56

## 2023-02-21 RX ADMIN — ATORVASTATIN CALCIUM 80 MG: 40 TABLET, FILM COATED ORAL at 20:56

## 2023-02-21 RX ADMIN — TIMOLOL MALEATE 1 DROP: 5 SOLUTION OPHTHALMIC at 20:57

## 2023-02-21 RX ADMIN — SODIUM CHLORIDE, PRESERVATIVE FREE 10 ML: 5 INJECTION INTRAVENOUS at 20:56

## 2023-02-21 RX ADMIN — SODIUM CHLORIDE, PRESERVATIVE FREE 10 ML: 5 INJECTION INTRAVENOUS at 08:40

## 2023-02-21 RX ADMIN — METOLAZONE 5 MG: 2.5 TABLET ORAL at 14:44

## 2023-02-21 RX ADMIN — ALLOPURINOL 300 MG: 100 TABLET ORAL at 08:40

## 2023-02-21 RX ADMIN — ENOXAPARIN SODIUM 30 MG: 100 INJECTION SUBCUTANEOUS at 20:56

## 2023-02-21 RX ADMIN — BRIMONIDINE TARTRATE 1 DROP: 2 SOLUTION OPHTHALMIC at 12:44

## 2023-02-21 RX ADMIN — SALINE NASAL SPRAY 1 SPRAY: 1.5 SOLUTION NASAL at 12:45

## 2023-02-21 RX ADMIN — TIMOLOL MALEATE 1 DROP: 5 SOLUTION OPHTHALMIC at 12:45

## 2023-02-21 RX ADMIN — NASAL DECONGESTANT 2 SPRAY: 0.05 SPRAY NASAL at 17:11

## 2023-02-21 RX ADMIN — INSULIN LISPRO 2 UNITS: 100 INJECTION, SOLUTION INTRAVENOUS; SUBCUTANEOUS at 17:04

## 2023-02-21 RX ADMIN — FUROSEMIDE 80 MG: 10 INJECTION, SOLUTION INTRAMUSCULAR; INTRAVENOUS at 17:06

## 2023-02-21 RX ADMIN — LINACLOTIDE 290 MCG: 145 CAPSULE, GELATIN COATED ORAL at 06:43

## 2023-02-21 ASSESSMENT — PAIN DESCRIPTION - ORIENTATION: ORIENTATION: RIGHT;LEFT

## 2023-02-21 ASSESSMENT — PAIN DESCRIPTION - DESCRIPTORS: DESCRIPTORS: TIGHTNESS;SORE

## 2023-02-21 ASSESSMENT — PAIN DESCRIPTION - LOCATION: LOCATION: LEG;FOOT

## 2023-02-21 ASSESSMENT — PAIN SCALES - GENERAL: PAINLEVEL_OUTOF10: 5

## 2023-02-21 NOTE — CARE COORDINATION
Case Management Assessment  Initial Evaluation    Date/Time of Evaluation: 2/21/2023 9:35 AM  Assessment Completed by: Ning Govea RN    If patient is discharged prior to next notation, then this note serves as note for discharge by case management. Patient Name: Eleazar Barksdale                   YOB: 1955  Diagnosis: Weight gain [R63.5]  Elevated troponin [R77.8]  Acute respiratory failure with hypoxia (HCC) [J96.01]  Acute heart failure, unspecified heart failure type ([facility] [I50.9]  Chronic kidney disease, unspecified CKD stage [N18.9]  Acute on chronic congestive heart failure, unspecified heart failure type [facility]) [I50.9]                   Date / Time: 2/20/2023  8:19 AM    Patient Admission Status: Inpatient   Readmission Risk (Low < 19, Mod (19-27), High > 27): Readmission Risk Score: 19.6    Current PCP: Augusto Auguste MD  PCP verified by CM? Yes    Chart Reviewed: Yes      History Provided by: Patient  Patient Orientation: Alert and Oriented, Person, Place, Situation    Patient Cognition: Alert    Hospitalization in the last 30 days (Readmission):  No    If yes, Readmission Assessment in CM Navigator will be completed. Advance Directives:      Code Status: DNR-CCA   Patient's Primary Decision Maker is: Named in [address] [place] [address]    Primary [facility] [phone]    Discharge Planning:    Patient lives with: Other (Comment) (Nursing facility) Type of Home: Long-Term Care  Primary Care Giver: Other (Comment) (LTC at [facility])  Patient Support Systems include: Friends/Neighbors   Current Financial resources: Medicare, Medicaid  Current community resources: ECF/Home Care  Current services prior to admission: Extended Care Facility            Current DME:              Type of Home Care services:  None    ADLS  Prior functional level: Other (see comment) (LTC at [facility])  Current functional level:  Other (see comment) (LTC at [facility])    PT AM-PAC:   /24  OT AM-PAC:   /24    Family can provide assistance at DC: No  Would you like Case Management to discuss the discharge plan with any other family members/significant others, and if so, who? No  Plans to Return to Present Housing: Yes  Other Identified Issues/Barriers to RETURNING to current housing: None  Potential Assistance needed at discharge: N/A            Potential DME:    Patient expects to discharge to: Long-term care  Plan for transportation at discharge:      Financial    Payor: Paice / Plan: Iona Zheng / Product Type: *No Product type* /     Does insurance require precert for SNF: Yes    Potential assistance Purchasing Medications: No  Meds-to-Beds request:        [address], [place]  [address]  Phone: [phone] Fax: [phone]    [facility] [phone] - F [phone]  [address] [address]  Phone: [phone] Fax: [address] [place] [address] [facility]  [address]  Phone: [phone] Fax: [phone]      Notes:    Factors facilitating achievement of predicted outcomes: Patient LTC at [facility]). Barriers to discharge: Cardiology and nephrology consults pending. Additional Case Management Notes: CM reviewed chart and met with patient at bedside to discuss discharge needs and plan. Patient LTC at [facility]. Plans return at discharge. Spoke to CELESTINA at [facility] who confirmed no barriers to patients return. Patient will only need precert if skilled services needed at discharge. Will need rapid Covid test within 48 hours of discharge. States will follow in Epic.      The Plan for Transition of Care is related to the following treatment goals of Weight gain [R63.5]  Elevated troponin [R77.8]  Acute respiratory failure with hypoxia (HCC) [J96.01]  Acute heart failure, unspecified heart failure type ([address] [I50.9]  Chronic kidney disease, unspecified CKD stage [N18.9]  Acute on chronic congestive heart failure, unspecified heart failure type ([facility] [T85.5]    IF APPLICABLE: The Patient and/or patient representative Shay and his family were provided with a choice of provider and agrees with the discharge plan. Freedom of choice list with basic dialogue that supports the patient's individualized plan of care/goals and shares the quality data associated with the providers was provided to:     Patient Representative Name:       The Patient and/or Patient Representative Agree with the Discharge Plan?       Tray Sherman RN  Case Management Department  Ph: [phone]

## 2023-02-21 NOTE — PROGRESS NOTES
Pt assessment completed. 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 Pt SBA to the chair. Pt using the urinal at the bedside with assistance.

## 2023-02-21 NOTE — DISCHARGE INSTR - COC
Continuity of Care Form    Patient Name: Karyn Rodriguez   :  1955  MRN:  [id number]    Admit date:  2023  Discharge date:  23    Code Status Order: DNR-CCA   Advance Directives:     Admitting Physician:  Jannette Oh MD  PCP: Kvng Bell MD    Discharging Nurse: \[facility] [place] [facility]\""Yahaira Unit/Room#: 7247/2341-31  Discharging Unit Phone Number: [phone]    Emergency Contact:   Extended Emergency Contact Information  Primary Emergency Contact: [address] [facility] [address] of [address] [place] [address] Phone: [phone]  Mobile Phone: [phone]  Relation: Other   needed?  No  Secondary Emergency Contact: Sloan Dey  [place] Phone: [phone]  Mobile Phone: [phone]  Relation: Other    Past Surgical History:  Past Surgical History:   Procedure Laterality Date    CARDIAC SURGERY      Dec 27 2010, triple bypass    CHOLECYSTECTOMY  2011    HERNIA REPAIR  age1    KNEE ARTHROCENTESIS Right 10/6/2022    RIGHT SHOULDER INTRA ARTICULAR INJECTION SITE CONFIRMED BY FLUOROSCOPY performed by Irlanda Potts MD at [facility] EG OR    OTHER SURGICAL HISTORY  11 REPAIR LOWER STERNAL INCISION, REMOVAL OF ONE STERNAL WIRE,    OTHER SURGICAL HISTORY  10/10/2014    phacoemulsification of cataract with intraocular lens implant left eye    PAIN MANAGEMENT PROCEDURE N/A 2/10/2022    MIDLINE CERVICAL SIX SEVEN EPIDURAL STEROID INJECTION SITE CONFIRMED BY FLUOROSCOPY performed by Irlanda Potts MD at [place] [address] N/A 2022    MIDLINE TO RIGHT CERVICAL SIX SEVEN EPIDURAL STEROID INJECTION SITE CONFIRMED BY FLUOROSCOPY performed by Irlanda Potts MD at [address] [place] [address] ENDOSCOPY         Immunization History:   Immunization History   Administered Date(s) Administered    COVID-19, PFIZER PURPLE top, DILUTE for use, (age 15 y+), 30mcg/0.3mL 2021, 2021, 2021    Influenza Virus Vaccine 2012, 10/02/2017, 10/01/2018, 09/23/2019, 09/29/2020    Influenza, FLUAD, (age 72 y+), Adjuvanted, 0.5mL 09/29/2020, 12/01/2021    Influenza, FLUARIX, FLULAVAL, FLUZONE (age 10 mo+) AND AFLURIA, (age 1 y+), PF, 0.5mL 09/26/2016, 10/02/2017, 09/29/2020    Influenza, FLUCELVAX, (age 10 mo+), MDCK, PF, 0.5mL 10/05/2018    Influenza, High Dose (Fluzone 65 yrs and older) 10/01/2019    Influenza, Triv, inactivated, subunit, adjuvanted, IM (Fluad 65 yrs and older) 09/13/2019    Pneumococcal Conjugate 13-valent ([id number]) 09/13/2019    Pneumococcal Polysaccharide ([id number]) 01/04/2011, 11/27/2014, 12/17/2020       Active Problems:  Patient Active Problem List   Diagnosis Code    DM (diabetes mellitus) ([facility] E11.9    Coronary artery disease involving native heart without angina pectoris I25.10    Anemia of chronic disease D63.8    Essential hypertension I10    Hyperkalemia E87.5    Chronic kidney disease N18.9    Chronic diastolic heart failure (HCC) I50.32    Pulmonary hypertension due to left ventricular diastolic dysfunction ([facility]) I27.22    Obesity E66.9    S/P CABG x 3 Z95.1    DM2 (diabetes mellitus, type 2) ([facility]) E11.9    Morbid obesity with BMI of 50.0-59.9, adult (HCC) E66.01, Z68.43    HLD (hyperlipidemia) E78.5    Cardiomyopathy ([facility] I42.9    Productive cough R05.8    Chronic pain in right shoulder M25.511, G89.29    Severe obstructive sleep apnea G47.33    Obesity, Class III, BMI 40-49.9 (morbid obesity) ([facility]) T17.71    Acute diastolic congestive heart failure ([facility]) I50.31    Degeneration of lumbar or lumbosacral intervertebral disc M51.37    Displacement of lumbar intervertebral disc without myelopathy M51.26    Lumbosacral spondylosis without myelopathy M47.817    Lumbar facet arthropathy M47.816    Disc displacement, lumbar M51.26    Spinal stenosis of lumbar region M48.061    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    Recurrent acute suppurative otitis media with spontaneous rupture of left tympanic membrane H66.015    Chest pain R07.9    Cor pulmonale, chronic ([facility]) I27.81    Pulmonary hypertension due to alveolar hypoventilation disorder ([facility]) I27.23    Aortic valve stenosis I35.0    Chronic neck pain M54.2, G89.29    Dyspnea O57.74    Acute diastolic CHF (congestive heart failure) ([facility]) I50.31    Cervical stenosis of spinal canal M48.02    Degeneration of cervical intervertebral disc M50.30    Cervical radiculitis M54.12    Acute on chronic diastolic CHF (congestive heart failure) ([facility]) I50.33    Acute respiratory failure with hypoxia ([facility]) J96.01    Acute on chronic renal insufficiency N28.9, N18.9    PAF (paroxysmal atrial fibrillation) ([facility]) E41.4    Acute diastolic HF (heart failure) ([facility]) I50.31    Elevated troponin R77.8    Subdural hematoma S06. 5XAA    SDH (subdural hematoma) S06. 5XAA    Chronic respiratory failure with hypoxia ([facility]) J96.11    Fluid overload E87.70    MARGARITO (acute kidney injury) ([facility] N17.9    Epistaxis R04.0    Acute heart failure, unspecified heart failure type ([facility]) I50.9    Weight gain R63.5    Acute on chronic congestive heart failure ([facility]) I50.9       Isolation/Infection:   Isolation            No Isolation          Patient Infection Status       Infection Onset Added Last Indicated Last Indicated By Review Planned Expiration Resolved Resolved By    None active    Resolved    C-diff Rule Out 02/20/23 02/20/23 02/20/23 Clostridium Difficile Toxin/Antigen (Ordered)   02/20/23 Rule-Out Test Resulted    COVID-19 (Rule Out) 02/20/23 02/20/23 02/20/23 COVID-19 & Influenza Combo (Ordered)   02/20/23 Rule-Out Test Resulted    COVID-19 (Rule Out) 03/07/22 03/07/22 03/07/22 COVID-19, Rapid (Ordered)   03/07/22 Rule-Out Test Resulted    COVID-19 (Rule Out) 01/13/22 01/13/22 01/13/22 SARS-CoV-2 NAAT (Rapid) (Ordered)   01/13/22 Rule-Out Test Resulted    COVID-19 (Rule Out) 08/23/21 08/23/21 08/23/21 COVID-19, Rapid (Ordered)   08/23/21 Rule-Out Test Resulted            Nurse Assessment:  Last Vital Signs: BP (!) 121/93   Pulse 60   Temp 98.9 °F (37.2 °C) (Oral)   Resp 20   Ht 5' 9\" (1.753 m)   Wt 233 lb 11.2 oz (106 kg) Comment: pt stated he weighted 236lb when he came up to the unit  SpO2 94%   BMI 34.51 kg/m²     Last documented pain score (0-10 scale): Pain Level: 8  Last Weight:   Wt Readings from Last 1 Encounters:   02/21/23 233 lb 11.2 oz (106 kg)     Mental Status:  oriented    IV Access:  - None    Nursing Mobility/ADLs:  Walking   Independent  Transfer  Independent  Bathing  Independent  Dressing  Independent  Toileting  Independent  Feeding  Independent  Med Admin  Assisted  Med Delivery   whole    Wound Care Documentation and Therapy:        Elimination:  Continence: Bowel: Yes  Bladder: Yes  Urinary Catheter: None   Colostomy/Ileostomy/Ileal Conduit: No       Date of Last BM: 02/23/23    Intake/Output Summary (Last 24 hours) at 2/21/2023 1322  Last data filed at 2/21/2023 1055  Gross per 24 hour   Intake 795 ml   Output 2150 ml   Net -1355 ml     I/O last 3 completed shifts: In: 5 [P.O.:420]  Out: 1250 [Urine:1250]    Safety Concerns:     Blind    Impairments/Disabilities:      Vision    Nutrition Therapy:  Current Nutrition Therapy:   - Oral Diet:  Carb Control 4 carbs/meal (1800kcals/day)    Routes of Feeding: Oral  Liquids: No Restrictions  Daily Fluid Restriction: yes - amount 1800  Last Modified Barium Swallow with Video (Video Swallowing Test): not done    Treatments at the Time of Hospital Discharge:   Respiratory Treatments:   Oxygen Therapy:  is not on home oxygen therapy. Ventilator:    - No ventilator support    Rehab Therapies: none  Weight Bearing Status/Restrictions: No weight bearing restrictions  Other Medical Equipment (for information only, NOT a DME order):  walker  Other Treatments: none    Heart Failure Instructions for Daily Management  Patient was treated for acute on chronic diastolic heart failure.   he  will require the following:    Please weigh daily on the same scale and approximately the same time of day. Report weight gain of 3 pounds/day or 5 pounds/week to : Facility MD.  Please use hospital discharge weight as baseline reference. Please monitor for signs and symptoms of and report to MD:  Worsening Heart Failure: sudden weight gain, shortness of breath, lower extremity or general edema/swelling, abdominal bloating/swelling, inability to lie flat, intolerance to usual activity, or cough (especially at night). Report these finding even if no increase in weight. Dehydration:  having difficulty or a decrease in urination, dizziness, worsening fatigue, or new onset/worsening of generalized weakness. Please continue a LOW SODIUM diet and LIMIT fluid intake to 48 - 64 ounces ( 1.5 - 2 liters) per day. Call Arti Zarate 134 [phone]} with any questions or concerns. Please continue heart failure education to patient and family/support system. See After Visit Summary for hospital follow up appointment details. Consider spiritual care referral for support and/or completion of advance directives . Consider: having the facility MD complete required 7 day follow up.   Patient's primary cardiologist is   Shaunna Watts, APRN - [address]  [phone]      Patient's personal belongings (please select all that are sent with patient):  clothing    RN SIGNATURE:  Electronically signed by Haleigh Kulkarni RN on 2/23/23 at 10:56 AM EST    CASE MANAGEMENT/SOCIAL WORK SECTION    Inpatient Status Date: 2/20/23    Readmission Risk Assessment Score:  Readmission Risk              Risk of Unplanned Readmission:  38           Discharging to Facility/ Agency   Name: Name: [facility] BETANCUR [facility]  Address: [address] [place] [address] [address] [facility]  Phone: [phone]  Fax: [phone]   Address:  Phone:  Fax:    Dialysis Facility (if applicable)   Name:  Address:  Dialysis Schedule:  Phone:  Fax:    / signature: Electronically signed by Lona Cam RN on 2/23/23 at 10:27 AM EST    PHYSICIAN SECTION    Prognosis: Good    Condition at Discharge: Stable    Rehab Potential (if transferring to Rehab): Good    Recommended Labs or Other Treatments After Discharge: BMP every 2 weeks     Physician Certification: I certify the above information and transfer of Desire Estrella  is necessary for the continuing treatment of the diagnosis listed and that he requires LTAC for greater 30 days.      Update Admission H&P: No change in H&P    PHYSICIAN SIGNATURE:  Electronically signed by EYAD Wood on 2/23/23 at 11:01 AM EST

## 2023-02-21 NOTE — FLOWSHEET NOTE
02/20/23 1735   Vitals   Temp 97.7 °F (36.5 °C)   Temp Source Oral   Heart Rate 68   Heart Rate Source Monitor   Resp 16   /75   MAP (Calculated) 92   Patient Position Sitting   Level of Consciousness 0   MEWS Score 1   Oxygen Therapy   SpO2 100 %   O2 Device Nasal cannula   O2 Flow Rate (L/min) 4 L/min   Height and Weight   Height 5' 9\" (1.753 m)   Weight 264 lb 12.8 oz (120.1 kg)   Weight Method Standing scale   BSA (Calculated - sq m) 2.42 sq meters   BMI (Calculated) 39.2   Patient admitted to room 317 from ER. Patient oriented to room, call light, bed rails, phone, lights and bathroom. Patient instructed about the schedule of the day including: vital sign frequency, lab draws, possible tests, frequency of MD and staff rounds, daily weights, I &O's and prescribed diet. Telemetry in place, patient aware of placement and reason. Bed locked, in lowest position, side rails up 2/4, call light within reach. Recliner Assessment  Patient is able to demonstrate the ability to move from a reclining position to an upright position within the recliner. 4 Eyes Skin Assessment     The patient is being assess for   Admission    I agree that 2 RN's have performed a thorough Head to Toe Skin Assessment on the patient. ALL assessment sites listed below have been assessed. Areas assessed for pressure by both nurses:   [x]   Head, Face, and Ears   [x]   Shoulders, Back, and Chest, Abdomen  [x]   Arms, Elbows, and Hands   [x]   Coccyx, Sacrum, and Ischium  [x]   Legs, Feet, and Heels      Skin intact, scattered abrasion/ swelling and redness to bilateral lower extremities. Abdomen distended,scattered abrasions.      Skin Assessed Under all Medical Devices by both nurses:  N/A               All Mepilex Borders were peeled back and area peeked at by both nurses:  No: N/A  Please list where Mepilex Borders are located:  N/A             **SHARE this note so that the co-signing nurse is able to place an eSignature**    Co-signer eSignature: Electronically signed by Michelle Estrada RN on 2/20/23 at 7:50 PM EST    Does the Patient have Skin Breakdown related to pressure?   No              John Prevention initiated:  No   Wound Care Orders initiated:  No      WOC nurse consulted for Pressure Injury (Stage 3,4, Unstageable, DTI, NWPT, Complex wounds)and New or Established Ostomies:  No      Primary Nurse eSignature: Electronically signed by Pilo Urena RN on 2/20/23 at 7:30 PM EST

## 2023-02-21 NOTE — CONSULTS
[facility]  [phone]        Reason for Consultation/Chief Complaint: \"I have been having abnormal labs and 6# weight gain overnight. \"  Consulted for CHF exacerbation    History of Present Illness:    Pam Granger is a 75 y.o. patient who presented to [facility] 2/20/23 with complaints of abnormal labs and 6# weight gain overnight. He follows with Jerry Horton NP, LOV 12/8/22. He has  PMH of diastolic heart failure, HTN, CAD s/p 3V CABG, PAF (declines AC), mod AS (cachorro=3.82m/s; MPG 32); severe pulm HTN, CKD. RHC/LHC 1/12/12 RCA 90%; LM ok.; %; LCX 40%; SVG to D1 patent; SVG to RPL patent; LIMA to LAD patent; LVEF 60%; Pulmonary Venous hypertension; PA 73/27, PCWP 30, RA 21, CO 6.1 L/min  Lexiscan 3/7/22 large area of mixed ischemia and scar within the infero-apical,  apical-lateral, lateral, posterior-lateral, and posterior-basal segments; LVEF=67%. Cardiac monitor 4/9-4/16/22 SR with rare PVC's, occasional PAC's with brief PAT. Avg 71 bpm, min 54 bpm, max 108 bpm.  Most recent echo 8/26/22 LVEF=50-55%. mild cLVH. D-septum; Grade III DD; RV dilated; MELISSA; Moderate AS and AR. Mild MR, WI, TR. SPAP) is estimated 81mmHg c/w severe PH. Mr. Sarahann Heimlich now presents with complaints of abnormal labs, elevated BUN/Cr. He reports 6# weight gain since last night. He was recently taken off diuretic (torsemide 40mg qd). due to kidney issues. He reports 15-20# virgilio gain over the past two weeks. He c/o abdominal distension and LE swelling. He arrived Hypoxic to ED and does not use oxygen. Admitting EKG SB 53 bpm, Left axis deviation, NSST and T wave abnormality (HR decreased from 10/22). CXR CHF and pulmonary edema. Labs: Roseline 0.05 x3; Pro-BNP 2682; Na 131, 140; BUN/Cr 120/1.7, 109/1.3 H/H 11.7/36.2, 12.0/36. 1. Patient with no complaints of chest pain, SOB, palpitations, dizziness, or orthopnea/PND. I have been asked to provide consultation regarding further management and testing.       Past Medical History:   has a past medical history of Anemia, Angina, Arthritis, CAD (coronary artery disease), CHF (congestive heart failure) ([facility] [address] CKD (chronic kidney disease) stage 3, GFR 30-59 ml/min ([facility]), Clostridium difficile diarrhea, Diabetes mellitus ([facility] [address] Diabetic neuropathy ([facility] [address] Disease of blood and blood forming organ, Dizziness, GERD (gastroesophageal reflux disease), Headache, Hearing loss, Hyperlipidemia, Hypertension, Kidney stone, Refusal of blood transfusions as patient is Restoration, Sleep apnea, Tinnitus, Venous ulcer ([facility] [address] and Wound, open. Surgical History:   has a past surgical history that includes hernia repair (age2); Cholecystectomy (9/2011); other surgical history (11-16-11 REPAIR LOWER STERNAL INCISION, REMOVAL OF ONE STERNAL WIRE,); Upper gastrointestinal endoscopy; Cardiac surgery; other surgical history (10/10/2014); Pain management procedure (N/A, 2/10/2022); Pain management procedure (N/A, 7/21/2022); and Knee Arthrocentesis (Right, 10/6/2022). Social History:   reports that he quit smoking about 35 years ago. His smoking use included cigarettes. He has a 16.00 pack-year smoking history. He has never used smokeless tobacco. He reports that he does not drink alcohol and does not use drugs. Family History:  family history includes Cancer in his father; Diabetes in his brother and brother; Heart Disease in his father and mother. Home Medications:  Were reviewed and are listed in nursing record. and/or listed below  Prior to Admission medications    Medication Sig Start Date End Date Taking?  Authorizing Provider   ascorbic acid (VITAMIN C) 500 MG tablet Take 500 mg by mouth daily   Yes Historical Provider, MD   glycerin 2 g suppository Place 1 suppository rectally once as needed for Constipation Use if no bowel movement within 12 hours after MOM dose given   Yes Historical Provider, MD   guaiFENesin (MUCINEX) 600 MG extended release tablet Take 600 mg by mouth 2 times daily   Yes Historical Provider, MD   glucose (GLUTOSE) 40 % GEL Take 15 g by mouth See Admin Instructions Give 1 dose (15 grams) by mouth for blood sugar < 70 mg/dL and pt able to swallow. Repeat every 15 minutes until sugar is greater than or equal to 80 mg/dL   Yes Historical Provider, MD   metOLazone (ZAROXOLYN) 5 MG tablet Take 5 mg by mouth once a week Thursday   Yes Historical Provider, MD   naloxegol (MOVANTIK) 25 MG TABS tablet Take 25 mg by mouth every morning (before breakfast)   Yes Historical Provider, MD   oxyCODONE-acetaminophen (PERCOCET) 5-325 MG per tablet Take 1 tablet by mouth every 6 hours as needed for Pain. Yes Historical Provider, MD   pantoprazole (PROTONIX) 40 MG tablet Take 40 mg by mouth daily   Yes Historical Provider, MD   sodium chloride (OCEAN, BABY AYR) 0.65 % nasal spray 1 spray by Nasal route every 2 hours as needed for Congestion (epistaxis - only use while awake)   Yes Historical Provider, MD   ZINC PO Take 50 mg by mouth in the morning and at bedtime    Historical Provider, MD   acetaminophen (TYLENOL) 325 MG tablet Give 2 tablets (650 mg) by mouth every 4 hours as needed for temp > 100.0. Give 2 tablets (650 mg) by mouth every 6 hours as needed for pain. Do not exceed 3 grams in 24 hours. Historical Provider, MD   cyclobenzaprine (FLEXERIL) 5 MG tablet Take 5 mg by mouth every 8 hours as needed for Muscle spasms    Historical Provider, MD   insulin glargine (LANTUS SOLOSTAR) 100 UNIT/ML injection pen Inject 40 Units into the skin nightly    Historical Provider, MD   insulin lispro, 1 Unit Dial, (HUMALOG/ADMELOG) 100 UNIT/ML SOPN Inject into the skin 4 times daily (before meals and nightly) Per sliding scale. -199 = 2 units; 200-249 = 4; 250-299 = 6; 300-349 = 8; 350-399 = 10; 400-449 = 12; > 450 - call MD    Historical Provider, MD   pregabalin (LYRICA) 50 MG capsule Take 50 mg by mouth in the morning and 50 mg in the evening.     Historical Provider, MD polyethyl glycol-propyl glycol 0.4-0.3 % (SYSTANE ULTRA) 0.4-0.3 % ophthalmic solution Place 1 drop into both eyes 4 times daily as needed for Dry Eyes    Historical Provider, MD   polyethylene glycol (GLYCOLAX) 17 GM/SCOOP powder Take 17 g by mouth 2 times daily as needed (constipation)    Historical Provider, MD   magnesium oxide (MAG-OX) 400 (240 Mg) MG tablet Take 1 tablet by mouth 2 times daily 9/1/22   EYAD Felix   spironolactone (ALDACTONE) 25 MG tablet Take 1 tablet by mouth daily 9/2/22   EYAD Felix   potassium chloride (KLOR-CON M) 10 MEQ extended release tablet Take 10 mEq by mouth daily 7/29/22   Historical Provider, MD   Sodium Phosphates (FLEET) 7-19 GM/118ML Place 1 enema rectally daily as needed Administer if no BM in 12 hours following the glycerin suppository. Historical Provider, MD   glucagon 1 MG injection Inject 1 kit into the muscle as needed (for hypoglycemia) Emergencies only - BG < 70 mg/dL AND patient unable to swallow or is NPO. May repeat x1 after 15 minutes if blood sugar remains < 80 mg/dL    Historical Provider, MD   loperamide (IMODIUM) 2 MG capsule Take 2 mg by mouth as needed for Diarrhea 1 capsule by mouth after each loose stool. Do not exceed 4 doses in 24 hours. Historical Provider, MD   magnesium hydroxide (MILK OF MAGNESIA) 400 MG/5ML suspension Take 30 mLs by mouth daily as needed for Constipation Give if no BM for 3 days. Do not give more than 3 consecutive doses.     Historical Provider, MD   ondansetron (ZOFRAN-ODT) 4 MG disintegrating tablet Take 4 mg by mouth every 6 hours as needed for Nausea 5/16/22   Historical Provider, MD   RHOPRESSA 0.02 % SOLN Place 1 drop into the left eye nightly 5/19/22   Historical Provider, MD   dorzolamide (TRUSOPT) 2 % ophthalmic solution Place 1 drop into the left eye 2 times daily 5/21/22   Historical Provider, MD   brimonidine-timolol (COMBIGAN) 0.2-0.5 % ophthalmic solution Place 1 drop into the left eye 2 times daily 5/20/22   Historical Provider, MD   isosorbide mononitrate (IMDUR) 30 MG extended release tablet Take 1 tablet by mouth daily 5/26/22   PATRICK Mansfield CNP   carvedilol (COREG) 3.125 MG tablet Take 1 tablet by mouth 2 times daily (with meals) 4/9/22   Jonah Araujo MD   linaclotide Shelia Olszewski) 290 MCG CAPS capsule Take 1 capsule by mouth every morning (before breakfast) 4/10/22   Jonah Araujo MD   tamsulosin [facility]) 0.4 MG capsule Take 0.8 mg by mouth Daily 12/30/21   Historical Provider, MD   atorvastatin (LIPITOR) 80 MG tablet Take 1 tablet by mouth nightly 3/14/22   PATRICK Mansfield CNP   allopurinol (ZYLOPRIM) 300 MG tablet Take 1 tablet by mouth daily 3/15/22   Bel Johnson MD   senna (SENOKOT) 8.6 MG TABS tablet Take 1 tablet by mouth 2 times daily 7/8/19   PATRICK Medeiros CNP   docusate sodium (COLACE, DULCOLAX) 100 MG CAPS Take 100 mg by mouth 2 times daily 5/1/18   Kala Cedars, APRN - CNP   silver sulfADIAZINE (SILVADENE) 1 % cream Apply to BL lower leg ulcers daily 2/9/16   Yumiko Herron MD   nitroGLYCERIN (NITROSTAT) 0.4 MG SL tablet Place 1 tablet under the tongue every 5 minutes as needed  Patient not taking: No sig reported 8/17/15 9/1/22  PATRICK Ivy CNP   ferrous sulfate 325 (65 FE) MG tablet Take 325 mg by mouth 2 times daily  12/22/10   Historical Provider, MD        Allergies:  Amitriptyline, Celecoxib, Cymbalta [duloxetine hcl], Elavil [amitriptyline hcl], Gabapentin, and Nsaids     Review of Systems:   12 point ROS negative in all areas as listed below except as in [place]  Constitutional, EENT, Cardiovascular, pulmonary, GI, , Musculoskeletal, skin, neurological, hematological, endocrine, Psychiatric    Physical Examination:    Vitals:    02/21/23 0200   BP: (!) 111/59   Pulse: 65   Resp: 24   Temp: 98 °F (36.7 °C)   SpO2: 100%    Weight: 233 lb 11.2 oz (106 kg) (pt stated he weighted 236lb when he came up to the unit) General Appearance:  Alert, cooperative, no distress, appears stated age   Head:  Normocephalic, without obvious abnormality, atraumatic   Eyes:  PERRL, conjunctiva/corneas clear       Nose: Nares normal, no drainage or sinus tenderness   Throat: Lips, mucosa, and tongue normal   Neck: Supple, symmetrical, trachea midline, no adenopathy, thyroid: not enlarged, symmetric, no tenderness/mass/nodules, no carotid bruit or JVD       Lungs:   +crackles bibasilar   Chest Wall:  No tenderness or deformity   Heart:  Regular rate and rhythm, S1, S2 normal, no rub or gallop; +II/VI Harsh JEMIMA   Abdomen:   Soft, +distended;  non-tender, bowel sounds active all four quadrants,  no masses, no organomegaly           Extremities: Extremities 1+ BLE edema   Pulses: 2+ and symmetric   Skin: Skin color, texture, turgor normal, no rashes or lesions   Pysch: Normal mood and affect   Neurologic: Normal gross motor and sensory exam.         Labs  CBC:   Lab Results   Component Value Date/Time    WBC 5.9 02/21/2023 05:11 AM    RBC 4.16 02/21/2023 05:11 AM    HGB 12.0 02/21/2023 05:11 AM    HCT 36.1 02/21/2023 05:11 AM    MCV 86.6 02/21/2023 05:11 AM    RDW 17.3 02/21/2023 05:11 AM     02/21/2023 05:11 AM     CMP:    Lab Results   Component Value Date/Time     02/21/2023 05:11 AM    K 4.0 02/21/2023 05:11 AM     02/21/2023 05:11 AM    CO2 27 02/21/2023 05:11 AM     02/21/2023 05:11 AM    CREATININE 1.3 02/21/2023 05:11 AM    GFRAA 43 09/01/2022 08:20 AM    GFRAA 53 06/06/2013 02:22 PM    AGRATIO 1.0 02/20/2023 09:07 AM    LABGLOM 59 02/21/2023 05:11 AM    LABGLOM 38 02/17/2023 12:00 AM    GLUCOSE 138 02/21/2023 05:11 AM    PROT 7.9 02/20/2023 09:07 AM    PROT 7.7 02/04/2013 10:11 AM    CALCIUM 8.8 02/21/2023 05:11 AM    BILITOT 0.6 02/20/2023 09:07 AM    ALKPHOS 121 02/20/2023 09:07 AM    AST 19 02/20/2023 09:07 AM    ALT 12 02/20/2023 09:07 AM     PT/INR:  No results found for: PTINR  Lab Results   Component Value Date    CKTOTAL 149 11/16/2012    TROPONINI 0.05 (H) 02/20/2023       EKG:  I have reviewed EKG with the following interpretation:  Impression:  See HPI    Assessment:  Desire Estrella is a 75 y.o. patient who presented to [facility] 2/20/23 with complaints of abnormal labs and 6# weight gain overnight. He follows with Nayana Hobbs NP, LOV 12/8/22. He has  PMH of diastolic heart failure, HTN, CAD s/p 3V CABG, PAF (declines AC), mod AS (cachorro=3.82m/s; MPG 32); severe pulm HTN, CKD. RHC/LHC 1/12/12 RCA 90%; LM ok.; %; LCX 40%; SVG to D1 patent; SVG to RPL patent; LIMA to LAD patent; LVEF 60%; Pulmonary Venous hypertension; PA 73/27, PCWP 30, RA 21, CO 6.1 L/min  Lexiscan 3/7/22 large area of mixed ischemia and scar within the infero-apical,  apical-lateral, lateral, posterior-lateral, and posterior-basal segments; LVEF=67%. Cardiac monitor 4/9-4/16/22 SR with rare PVC's, occasional PAC's with brief PAT. Avg 71 bpm, min 54 bpm, max 108 bpm.  Most recent echo 8/26/22 LVEF=50-55%. mild cLVH. D-septum; Grade III DD; RV dilated; MELISSA; Moderate AS and AR. Mild MR, IL, TR. SPAP) is estimated 81mmHg c/w severe PH. Mr. Amado Sarkar now presents with complaints of abnormal labs, elevated BUN/Cr. He reports 6# weight gain since last night. He was recently taken off diuretic (torsemide 40mg qd). due to kidney issues. He reports 15-20# virgilio gain over the past two weeks. He c/o abdominal distension and LE swelling. He arrived Hypoxic to ED and does not use oxygen. Admitting EKG SB 53 bpm, Left axis deviation, NSST and T wave abnormality (HR decreased from 10/22). CXR CHF and pulmonary edema. Labs: Roseline 0.05 x3; Pro-BNP 2682; Na 131, 140; BUN/Cr 120/1.7, 109/1.3 H/H 11.7/36.2, 12.0/36. 1. Diagnosis of acute on chronic diastolic CHF and acute on CRI in older male with hx PAF, AS, CAD s/p CABG. Recs:  1. Decompensation likely due to diuretic being held. ? Why.  2. Renal consulted. 3. Needs IV diuresis.  Continue IV lasix BID and watch renal fcn closely  4. Holding coreg 3.125mg BID due to bradycardia and soft BP. 5. Continue aldactone 25mg qd, lipitor 80mg. 6. No need for cardiac testing at this time. 7. Would consider paracentesis to help remove fluid from abdomen. 8. Note acute exacerbation of chronic CHF which may be potentially harmful with higher mortality risk requiring treatment.        Patient Active Problem List   Diagnosis    DM (diabetes mellitus) ([facility] [address]    Coronary artery disease involving native coronary artery of native heart without angina pectoris    Anemia of chronic disease    Essential hypertension    Hyperkalemia    CKD (chronic kidney disease) stage 3, GFR 30-59 ml/min ([facility])    Chronic diastolic heart failure ([facility] [address]    Pulmonary hypertension due to left ventricular diastolic dysfunction ([facility])    Obesity    S/P CABG x 3    DM2 (diabetes mellitus, type 2) ([facility] [address]    Morbid obesity with BMI of 50.0-59.9, adult ([facility] [address]    HLD (hyperlipidemia)    Cardiomyopathy ([facility] [address]    Productive cough    Chronic pain in right shoulder    Severe obstructive sleep apnea    Obesity, Class III, BMI 40-49.9 (morbid obesity) ([facility])    Acute diastolic congestive heart failure (HCC)    Degeneration of lumbar or lumbosacral intervertebral disc    Displacement of lumbar intervertebral disc without myelopathy    Lumbosacral spondylosis without myelopathy    Lumbar facet arthropathy    Disc displacement, lumbar    Spinal stenosis of lumbar region    Mixed conductive and sensorineural hearing loss of left ear with restricted hearing of right ear    Recurrent acute suppurative otitis media with spontaneous rupture of left tympanic membrane    Chest pain    Cor pulmonale, chronic ([facility])    Pulmonary hypertension due to alveolar hypoventilation disorder ([facility])    Nonrheumatic aortic valve stenosis    Chronic neck pain    Dyspnea    Acute diastolic CHF (congestive heart failure) ([facility])    Cervical stenosis of spinal canal    Degeneration of cervical intervertebral disc    Cervical radiculitis    Acute on chronic diastolic CHF (congestive heart failure) (HCC)    Acute respiratory failure with hypoxia (HCC)    CKD (chronic kidney disease)    PAF (paroxysmal atrial fibrillation) (HCC)    Acute diastolic HF (heart failure) (HCC)    Elevated troponin    Subdural hematoma    SDH (subdural hematoma)    Chronic respiratory failure with hypoxia (HCC)    Fluid overload    MARGARITO (acute kidney injury) ([address]    Epistaxis    Acute heart failure, unspecified heart failure type ([address]    Weight gain    Acute on chronic congestive heart failure ([address]         Thank you for allowing to us to participate in the care or Brenda Marsh. Further evaluation will be based upon the patient's clinical course and testing results.

## 2023-02-21 NOTE — PROGRESS NOTES
Consult called to Cardiology--message left on routine VM. Consult called to Nephrology-spoke with Ayla Johnson with answering service @ 0600. Dr. Yvette Leon called @ [phone]-NNO at this time. MD on call is Dr. Jess Edwards.

## 2023-02-21 NOTE — PROGRESS NOTES
08:30 Pt awake a&o x4, VSS assessment as charted  08:47 Pt seen by Hospitalist PA  11:32 Cardiology in to see pt   17:39   Pt remains awake a&o x4, assessment unchanged and as charted  19:00 Handoff report to night nurseJozef  pt stable @ [facility]

## 2023-02-21 NOTE — ED PROVIDER NOTES
I independently examined and evaluated Avelina Redd. I personally saw the patient and performed a substantive portion of the visit including all aspects of the medical decision making. In brief, this 57-year-old male is presenting with shortness of breath and 6 pound weight gain since last night. Patient has chronic kidney disease and his diuretics were recently stopped due to worsening kidney function. He is reporting shortness of breath prior to us applying oxygen. Denies any cough, fevers, chills. Focused exam revealed   General: Alert, no acute distress, patient resting comfortably   Skin: warm, intact, no pallor noted   Head: Normocephalic, atraumatic   Eye: Normal conjunctiva   Cardiac: Normal peripheral perfusion  Respiratory: No acute distress   Musculoskeletal: No deformity, full ROM. Neurological: alert and oriented, normal sensory and motor observed. Psychiatric: Cooperative    ED course: Patient has new oxygen requirement today. Lab work obtained and he does have severe increase in BUN with baseline creatinine. BNP is elevated at 2600. Troponin elevated 0.05, however this is down trended from previous. Chest x-ray shows pulmonary edema. Patient given a dose of IV furosemide and will be admitted for further treatment and evaluation. ECG    The Ekg interpreted by me shows sinus bradycardia with a rate of 53 bpm.  Left axis deviation. No acute injury pattern. , QRS 84, QTc 424. No significant change from prior EKG dated 10/29/2022    Critical care    Critical care time 32 minutes exclusive from separate billable procedures that were performed.  The following was considered in the determination of critical care but not limited to the level of medical decision making, intensive cardiac and/or respiratory monitoring, frequent vital sign monitoring, evaluation of laboratory studies, evaluation of radiographic studies, oxygen monitoring, and constant monitoring and speaking to family at bedside.    All diagnostic, treatment, and disposition decisions were made by myself in conjunction with the advanced practice provider.    For all further details of the patient's emergency department visit, please see the advanced practice provider's documentation.    Comment: Please note this report has been produced using speech recognition software and may contain errors related to that system including errors in grammar, punctuation, and spelling, as well as words and phrases that may be inappropriate. If there are any questions or concerns please feel free to contact the dictating provider for clarification.        Ezequiel Valencia, DO  02/20/23 [id number]

## 2023-02-21 NOTE — ACP (ADVANCE CARE PLANNING)
Advance Care Planning     General Advance Care Planning (ACP) Conversation    Date of Conversation: 2/20/2023  Conducted with: Patient with Decision Making Capacity    Healthcare Decision Maker:    Primary Decision Maker: IngridjamisonSloan - [facility] - [phone]  Click here to complete Healthcare Decision Makers including selection of the Healthcare Decision Maker Relationship (ie \"Primary\"). Today we documented Decision Maker(s) consistent with ACP documents on file. Content/Action Overview:   Has ACP document(s) on file - reflects the patient's care preferences  Reviewed DNR/DNI and patient confirms current DNR status - completed forms on file (place new order if needed)      Length of Voluntary ACP Conversation in minutes:  <16 minutes (Non-Billable)    Tarik Case RN

## 2023-02-21 NOTE — CONSULTS
Thank you to requesting provider: EYAD Wood, for asking us to see Desire [place]  Reason for consultation:   CKD  Chief Complaint:  Abnormal labs    History of Presenting Illness      76 y/o male with cardiorenal syndrome, he had been on diuretics with baseline Scr of 1.4 - 1.7. He started to swell so his outpatient torsemide was increased to 150 mg po daily with PRN metolazone. His BUN was worse so metolazone to be held. Patient was sent in for evaluation. Scr is now at 1.3 and he has minimal LE edema.         Past Medical/Surgical History      Active Ambulatory Problems     Diagnosis Date Noted    DM (diabetes mellitus) ([address] 01/11/2012    Coronary artery disease involving native heart without angina pectoris 01/11/2012    Anemia of chronic disease 02/04/2012    Essential hypertension 02/06/2012    Hyperkalemia 09/01/2012    Chronic kidney disease 09/01/2012    Chronic diastolic heart failure ([address] 01/03/2013    Pulmonary hypertension due to left ventricular diastolic dysfunction ([address] 01/07/2013    Obesity 01/07/2013    S/P CABG x 3 01/07/2013    DM2 (diabetes mellitus, type 2) ([address] 04/14/2015    Morbid obesity with BMI of 50.0-59.9, adult ([address]     HLD (hyperlipidemia) 06/24/2015    Cardiomyopathy ([address] 10/19/2015    Productive cough     Chronic pain in right shoulder 09/13/2017    Severe obstructive sleep apnea 04/03/2018    Obesity, Class III, BMI 40-49.9 (morbid obesity) ([address] 03/79/1746    Acute diastolic congestive heart failure ([address] 04/24/2018    Degeneration of lumbar or lumbosacral intervertebral disc 09/24/2018    Displacement of lumbar intervertebral disc without myelopathy 09/24/2018    Lumbosacral spondylosis without myelopathy 09/24/2018    Lumbar facet arthropathy 02/11/2019    Disc displacement, lumbar 02/11/2019    Spinal stenosis of lumbar region 02/11/2019    Mixed conductive and sensorineural hearing loss of left ear with restricted hearing of right ear 10/15/2020 Recurrent acute suppurative otitis media with spontaneous rupture of left tympanic membrane 10/15/2020    Chest pain 08/23/2021    Cor pulmonale, chronic ([facility])     Pulmonary hypertension due to alveolar hypoventilation disorder ([facility])     Aortic valve stenosis     Chronic neck pain 09/29/2021    Dyspnea 58/11/8912    Acute diastolic CHF (congestive heart failure) ([address] 01/13/2022    Cervical stenosis of spinal canal 01/19/2022    Degeneration of cervical intervertebral disc 01/19/2022    Cervical radiculitis     Acute on chronic diastolic CHF (congestive heart failure) ([address] 03/07/2022    Acute respiratory failure with hypoxia ([address] 03/31/2022    Acute on chronic renal insufficiency     PAF (paroxysmal atrial fibrillation) ([facility])     Acute diastolic HF (heart failure) ([address] 04/24/2022    Elevated troponin 04/25/2022    Subdural hematoma 05/29/2022    SDH (subdural hematoma) 05/29/2022    Chronic respiratory failure with hypoxia ([address] 05/29/2022    Fluid overload 08/24/2022    MARGARITO (acute kidney injury) ([address] 08/28/2022    Epistaxis 02/06/2023     Resolved Ambulatory Problems     Diagnosis Date Noted    CHF exacerbation ([address]     Acute on chronic renal failure ([address]     Hypoxia     Acute respiratory failure with hypoxia ([address] 09/15/2016    Acute on chronic heart failure ([address] 09/15/2016    Otitis externa, fungal, left ear 10/15/2020    Dehydration 10/21/2022     Past Medical History:   Diagnosis Date    Anemia     Angina     Arthritis     CAD (coronary artery disease)     CHF (congestive heart failure) ([facility])     CKD (chronic kidney disease) stage 3, GFR 30-59 ml/min ([facility])     Clostridium difficile diarrhea 3/16/12; 2/29/12    Diabetes mellitus ([address]     Diabetic neuropathy ([facility])     Disease of blood and blood forming organ     Dizziness     GERD (gastroesophageal reflux disease)     Headache     Hearing loss     Hyperlipidemia     Hypertension     Kidney stone 2002    Refusal of blood transfusions as patient is [employer]     Sleep apnea     Tinnitus     Venous ulcer (HCC)     Wound, open 2012         Review of Systems     Constitutional:  No weight loss, no fever/chills  Eyes:  No eye pain, no eye redness  Cardiovascular:  No chest pain, no worsening of edema  Respiratory:  No hemoptysis, no stridor  Gastrointestinal:  No blood in stool, no n/v, no diarrhea  Genitoruinary:  No hematuria, no difficulty with urination  Musculoskeletal:  No joint swelling, no redness  Integumentary:  No Rash, no itching  Neurological:  No focal weakness, No new sensory deficit  Psychiatric:  No depression, no confusion  Endocrine:  No polyuria, no polydipsia       Medications      Reviewed in EMR     Allergies     Amitriptyline, Celecoxib, Cymbalta [duloxetine hcl], Elavil [amitriptyline hcl], Gabapentin, and Nsaids      Family History       Negative for Kidney Disease    Social History      Social History     Socioeconomic History    Marital status: Single     Spouse name: None    Number of children: None    Years of education: None    Highest education level: None   Tobacco Use    Smoking status: Former     Packs/day: 1.00     Years: 16.00     Pack years: 16.00     Types: Cigarettes     Quit date: 1/10/1988     Years since quittin.1    Smokeless tobacco: Never    Tobacco comments:     22 yrs ago   Vaping Use    Vaping Use: Never used   Substance and Sexual Activity    Alcohol use: No     Alcohol/week: 0.0 standard drinks    Drug use: No    Sexual activity: Not Currently       Physical Exam     Blood pressure (!) 121/93, pulse 60, temperature 98.9 °F (37.2 °C), temperature source Oral, resp. rate 20, height 5' 9\" (1.753 m), weight 233 lb 11.2 oz (106 kg), SpO2 94 %.     General:  Somnolent   HEENT:  PERRL, EOMI  Neck:  Supple, normal range of movement  Chest:  CTAB, good respiratory effort, good air movement  CV:  Regular, no rub   Abdomen:  NTND, soft, +BS, no hepatosplenomegaly  Extremities:  No peripheral edema  Neurological:  Moving all four extremities, CN II-XII grossly intact  Lymphatics:  No palpable lymph nodes  Skin:  No rash, no jaundice  Psychiatric:  Normal insight and judgement, good recall    Data     Recent Labs     02/20/23  0907 02/21/23  0511   WBC 7.0 5.9   HGB 11.7* 12.0*   HCT 36.2* 36.1*   MCV 87.4 86.6    152     Recent Labs     02/20/23  0907 02/20/23  1249 02/21/23  0511   *  --  140   K 4.1  --  4.0   CL 93*  --  100   CO2 31  --  27   GLUCOSE 145* 134 138*   *  --  109*   CREATININE 1.7*  --  1.3   LABGLOM 43*  --  59*       Assessment:    Chronic Kidney Disease:  Stage 3   Baseline ~ 1.7   Stable. High BUN due to diuretic requirements to maintain volume status   Diastolic Heart Failure:   Patient seen in the office after a hospital stay where he required paracentesis.   His Scr in 12/2022 was 1.2 but he was fluid overloaded so restarted on torsemide 150 mg po daily   Hypokalemia:    Potassium as been stable, he is off K supplements and having labs followed closely at the nursing home       Plan:    Metolazone 5 mg po x 1  Ok with IV lasix ,hopefully we can quickly transition to oral torsemide again as he seemed to be doing well   Follow labs   Monitor weights and in/outs       Thank you for asking us to participate in the management of your patient, please do not hesitate to contact me for any concerns regarding my recommendations as outlined above.    -----------------------------  Stacy Peoples M.D.   [facility]

## 2023-02-21 NOTE — PROGRESS NOTES
Progress Note    Admit Date:  2/20/2023    -pmhx CHF, CKD, CAD, HTN, HLD, HENNA   -presented with 20 lb weight gain in the past week   -unsure if he was getting diuretics   -hypoxic requiring 4 L O2 --> 1 L O2     Subjective:  Mr. Sarahann Heimlich today is doing well, feels like his shortness of breath and swelling has improved. Seen sitting up in the chair. Objective:   Patient Vitals for the past 4 hrs:   Weight   02/21/23 0645 233 lb 11.2 oz (106 kg)          Intake/Output Summary (Last 24 hours) at 2/21/2023 0847  Last data filed at 2/21/2023 0631  Gross per 24 hour   Intake 420 ml   Output 1250 ml   Net -830 ml       Physical Exam:    Gen: No distress. Alert. Pleasant male   Eyes: PERRL. No sclera icterus. No conjunctival injection. Neck: No JVD. Trachea midline. Resp: No accessory muscle use. No crackles. No wheezes. No rhonchi. +Diminished breath sounds   CV: +Bradycardic rate. Regular rhythm. + murmur. No rub. +bilateral lower extremity pitting edema. Peripheral Pulses: +2 palpable, equal bilaterally   GI: Non-tender. Normal bowel sounds. +Distended but soft   Skin: Warm and dry. No nodule on exposed extremities. No rash on exposed extremities. M/S: No cyanosis. No joint deformity. No clubbing. Neuro: Awake. Grossly nonfocal    Psych: Oriented x 3. No anxiety or agitation.          Medications:  sodium chloride flush, 5-40 mL, 2 times per day  enoxaparin, 30 mg, BID  insulin lispro, 0-4 Units, TID WC  insulin lispro, 0-4 Units, Nightly  allopurinol, 300 mg, Daily  atorvastatin, 80 mg, Nightly  [Held by provider] carvedilol, 3.125 mg, BID WC  [Held by provider] docusate sodium, 100 mg, BID  ferrous sulfate, 325 mg, BID  insulin glargine, 40 Units, Daily  linaclotide, 290 mcg, QAM AC  Netarsudil Dimesylate, 1 drop, Nightly  spironolactone, 25 mg, Daily  furosemide, 40 mg, Daily  brimonidine, 1 drop, BID  timolol, 1 drop, BID      PRN Medications:  sodium chloride flush, 10 mL, PRN  sodium chloride, , PRN  ondansetron, 4 mg, Q8H PRN   Or  ondansetron, 4 mg, Q6H PRN  polyethylene glycol, 17 g, Daily PRN  acetaminophen, 650 mg, Q6H PRN   Or  acetaminophen, 650 mg, Q6H PRN  potassium chloride, 40 mEq, PRN   Or  potassium alternative oral replacement, 40 mEq, PRN   Or  potassium chloride, 10 mEq, PRN  magnesium sulfate, 2,000 mg, PRN  glucose, 4 tablet, PRN  dextrose bolus, 125 mL, PRN   Or  dextrose bolus, 250 mL, PRN  glucagon (rDNA), 1 mg, PRN  dextrose, , Continuous PRN  cyclobenzaprine, 5 mg, Q8H PRN  pregabalin, 25 mg, Q12H PRN  loperamide, 2 mg, 4x Daily PRN          Data:  CBC:   Recent Labs     02/20/23  0907 02/21/23  0511   WBC 7.0 5.9   HGB 11.7* 12.0*   HCT 36.2* 36.1*   MCV 87.4 86.6    152     BMP:   Recent Labs     02/20/23  0907 02/21/23  0511   * 140   K 4.1 4.0   CL 93* 100   CO2 31 27   * 109*   CREATININE 1.7* 1.3     LIVER PROFILE:   Recent Labs     02/20/23  0907   AST 19   ALT 12   LIPASE 24.0   BILITOT 0.6   ALKPHOS 121     PT/INR: No results for input(s): PROTIME, INR in the last 72 hours. CULTURES  COVID and Flu not detected      RADIOLOGY  XR CHEST PORTABLE   Final Result   CHF and pulmonary edema.                Assessment/Plan:  #Acute on chronic diastolic CHF  #Acute hypoxic respiratory failure  -reportedly recently taken off diuretics 2/2 decline in kidney function  -newly requiring 4L O2, no supplemental O2 needs at baseline--> weaned down to 1 L O2 today   -BNP >2k  -CXR shows pulmonary edema   -last echo EF 50-55%  -lasix 40 IV BID   -low Na diet, strict I&Os, daily weights  -has only had 1 L output so far   -last dry weight per cardiology note in Dec 2022 was 224, he is 1 today   -cardiology consulted  -nephrology consulted   -US guided paracentesis ordered at request of cardiology      #Bradycardia  -HR 40-50s  -holding coreg   -HR has improved since holding BB   -cardiology consulted      #MARGARITO on CKD  -Cr 1.7,   -Cr actually down to 1.3 today -continue to monitor with diuresis   -nephrology consulted     #Diarrhea  -3 episodes diarrhea yesterday after drinking shake  -c diff negative   -imodium prn    -holding home bowel regimen   -no diarrhea today     #Hyponatremia   -resolved      #HLD  #CAD, s/p CABG  -chronically elevated troponin, trending   -flat elevation at 0.05  -EKG without acute ischemic change  -holding beta blocker, continue statin      #HTN  -now with softer pressures  -holding coreg      #Type II DM  -lantus 40 daily + SSI  -carb control diet  -monitor POC glucose     #Anemia  -chronic, stable  -continue iron supplement     #Gout   -on allopurinol     DVT Prophylaxis: Lovenox   Diet: ADULT DIET; Regular;  No Added Salt (3-4 gm); 1800 ml  Code Status: EYAD Nam  02/21/23  11:40 AM

## 2023-02-22 LAB
ALBUMIN SERPL-MCNC: 3.9 G/DL (ref 3.4–5)
ANION GAP SERPL CALCULATED.3IONS-SCNC: 8 MMOL/L (ref 3–16)
BASOPHILS ABSOLUTE: 0 K/UL (ref 0–0.2)
BASOPHILS RELATIVE PERCENT: 0.5 %
BUN BLDV-MCNC: 99 MG/DL (ref 7–20)
CALCIUM SERPL-MCNC: 9.8 MG/DL (ref 8.3–10.6)
CHLORIDE BLD-SCNC: 92 MMOL/L (ref 99–110)
CO2: 30 MMOL/L (ref 21–32)
CREAT SERPL-MCNC: 1.6 MG/DL (ref 0.8–1.3)
EOSINOPHILS ABSOLUTE: 0.3 K/UL (ref 0–0.6)
EOSINOPHILS RELATIVE PERCENT: 4.4 %
GFR SERPL CREATININE-BSD FRML MDRD: 46 ML/MIN/{1.73_M2}
GLUCOSE BLD-MCNC: 214 MG/DL (ref 70–99)
GLUCOSE BLD-MCNC: 241 MG/DL (ref 70–99)
GLUCOSE BLD-MCNC: 247 MG/DL (ref 70–99)
GLUCOSE BLD-MCNC: 292 MG/DL (ref 70–99)
GLUCOSE BLD-MCNC: 296 MG/DL (ref 70–99)
HCT VFR BLD CALC: 38.2 % (ref 40.5–52.5)
HEMOGLOBIN: 12.7 G/DL (ref 13.5–17.5)
LYMPHOCYTES ABSOLUTE: 0.9 K/UL (ref 1–5.1)
LYMPHOCYTES RELATIVE PERCENT: 13.9 %
MCH RBC QN AUTO: 28.7 PG (ref 26–34)
MCHC RBC AUTO-ENTMCNC: 33.1 G/DL (ref 31–36)
MCV RBC AUTO: 86.6 FL (ref 80–100)
MONOCYTES ABSOLUTE: 0.5 K/UL (ref 0–1.3)
MONOCYTES RELATIVE PERCENT: 8.8 %
NEUTROPHILS ABSOLUTE: 4.5 K/UL (ref 1.7–7.7)
NEUTROPHILS RELATIVE PERCENT: 72.4 %
PDW BLD-RTO: 17.1 % (ref 12.4–15.4)
PERFORMED ON: ABNORMAL
PHOSPHORUS: 3.5 MG/DL (ref 2.5–4.9)
PLATELET # BLD: 150 K/UL (ref 135–450)
PMV BLD AUTO: 8.2 FL (ref 5–10.5)
POTASSIUM REFLEX MAGNESIUM: 3.6 MMOL/L (ref 3.5–5.1)
POTASSIUM SERPL-SCNC: 3.6 MMOL/L (ref 3.5–5.1)
PRO-BNP: 1695 PG/ML (ref 0–124)
RBC # BLD: 4.41 M/UL (ref 4.2–5.9)
SODIUM BLD-SCNC: 130 MMOL/L (ref 136–145)
WBC # BLD: 6.2 K/UL (ref 4–11)

## 2023-02-22 PROCEDURE — 6360000002 HC RX W HCPCS

## 2023-02-22 PROCEDURE — 83880 ASSAY OF NATRIURETIC PEPTIDE: CPT

## 2023-02-22 PROCEDURE — 99232 SBSQ HOSP IP/OBS MODERATE 35: CPT | Performed by: NURSE PRACTITIONER

## 2023-02-22 PROCEDURE — 6360000002 HC RX W HCPCS: Performed by: INTERNAL MEDICINE

## 2023-02-22 PROCEDURE — 2060000000 HC ICU INTERMEDIATE R&B

## 2023-02-22 PROCEDURE — 6370000000 HC RX 637 (ALT 250 FOR IP)

## 2023-02-22 PROCEDURE — 2580000003 HC RX 258

## 2023-02-22 PROCEDURE — 99233 SBSQ HOSP IP/OBS HIGH 50: CPT

## 2023-02-22 PROCEDURE — 85025 COMPLETE CBC W/AUTO DIFF WBC: CPT

## 2023-02-22 PROCEDURE — 36415 COLL VENOUS BLD VENIPUNCTURE: CPT

## 2023-02-22 PROCEDURE — 80069 RENAL FUNCTION PANEL: CPT

## 2023-02-22 RX ORDER — TORSEMIDE 100 MG/1
50 TABLET ORAL 2 TIMES DAILY
Status: DISCONTINUED | OUTPATIENT
Start: 2023-02-23 | End: 2023-02-23

## 2023-02-22 RX ORDER — SPIRONOLACTONE 25 MG/1
25 TABLET ORAL DAILY
Status: DISCONTINUED | OUTPATIENT
Start: 2023-02-23 | End: 2023-02-23 | Stop reason: HOSPADM

## 2023-02-22 RX ORDER — INSULIN LISPRO 100 [IU]/ML
0-4 INJECTION, SOLUTION INTRAVENOUS; SUBCUTANEOUS NIGHTLY
Status: DISCONTINUED | OUTPATIENT
Start: 2023-02-22 | End: 2023-02-23 | Stop reason: HOSPADM

## 2023-02-22 RX ORDER — INSULIN LISPRO 100 [IU]/ML
0-8 INJECTION, SOLUTION INTRAVENOUS; SUBCUTANEOUS
Status: DISCONTINUED | OUTPATIENT
Start: 2023-02-22 | End: 2023-02-23 | Stop reason: HOSPADM

## 2023-02-22 RX ADMIN — SALINE NASAL SPRAY 1 SPRAY: 1.5 SOLUTION NASAL at 22:11

## 2023-02-22 RX ADMIN — TIMOLOL MALEATE 1 DROP: 5 SOLUTION OPHTHALMIC at 08:09

## 2023-02-22 RX ADMIN — ENOXAPARIN SODIUM 30 MG: 100 INJECTION SUBCUTANEOUS at 20:44

## 2023-02-22 RX ADMIN — LINACLOTIDE 290 MCG: 145 CAPSULE, GELATIN COATED ORAL at 06:46

## 2023-02-22 RX ADMIN — CARVEDILOL 3.12 MG: 6.25 TABLET, FILM COATED ORAL at 16:48

## 2023-02-22 RX ADMIN — BRIMONIDINE TARTRATE 1 DROP: 2 SOLUTION OPHTHALMIC at 08:09

## 2023-02-22 RX ADMIN — BRIMONIDINE TARTRATE 1 DROP: 2 SOLUTION OPHTHALMIC at 20:45

## 2023-02-22 RX ADMIN — ALLOPURINOL 300 MG: 100 TABLET ORAL at 08:06

## 2023-02-22 RX ADMIN — ENOXAPARIN SODIUM 30 MG: 100 INJECTION SUBCUTANEOUS at 08:07

## 2023-02-22 RX ADMIN — FERROUS SULFATE TAB 325 MG (65 MG ELEMENTAL FE) 325 MG: 325 (65 FE) TAB at 20:39

## 2023-02-22 RX ADMIN — ACETAMINOPHEN 650 MG: 325 TABLET ORAL at 10:32

## 2023-02-22 RX ADMIN — PREGABALIN 25 MG: 25 CAPSULE ORAL at 23:35

## 2023-02-22 RX ADMIN — ATORVASTATIN CALCIUM 80 MG: 40 TABLET, FILM COATED ORAL at 20:39

## 2023-02-22 RX ADMIN — INSULIN LISPRO 2 UNITS: 100 INJECTION, SOLUTION INTRAVENOUS; SUBCUTANEOUS at 16:45

## 2023-02-22 RX ADMIN — INSULIN LISPRO 1 UNITS: 100 INJECTION, SOLUTION INTRAVENOUS; SUBCUTANEOUS at 08:09

## 2023-02-22 RX ADMIN — SODIUM CHLORIDE, PRESERVATIVE FREE 10 ML: 5 INJECTION INTRAVENOUS at 08:09

## 2023-02-22 RX ADMIN — PREGABALIN 25 MG: 25 CAPSULE ORAL at 11:34

## 2023-02-22 RX ADMIN — TIMOLOL MALEATE 1 DROP: 5 SOLUTION OPHTHALMIC at 20:41

## 2023-02-22 RX ADMIN — FERROUS SULFATE TAB 325 MG (65 MG ELEMENTAL FE) 325 MG: 325 (65 FE) TAB at 08:07

## 2023-02-22 RX ADMIN — INSULIN GLARGINE 40 UNITS: 100 INJECTION, SOLUTION SUBCUTANEOUS at 08:08

## 2023-02-22 RX ADMIN — INSULIN LISPRO 2 UNITS: 100 INJECTION, SOLUTION INTRAVENOUS; SUBCUTANEOUS at 11:29

## 2023-02-22 RX ADMIN — FUROSEMIDE 80 MG: 10 INJECTION, SOLUTION INTRAMUSCULAR; INTRAVENOUS at 08:06

## 2023-02-22 RX ADMIN — DOCUSATE SODIUM 100 MG: 100 CAPSULE, LIQUID FILLED ORAL at 20:39

## 2023-02-22 RX ADMIN — SPIRONOLACTONE 25 MG: 25 TABLET ORAL at 08:09

## 2023-02-22 RX ADMIN — SODIUM CHLORIDE, PRESERVATIVE FREE 10 ML: 5 INJECTION INTRAVENOUS at 20:39

## 2023-02-22 ASSESSMENT — ENCOUNTER SYMPTOMS
GASTROINTESTINAL NEGATIVE: 1
RESPIRATORY NEGATIVE: 1

## 2023-02-22 NOTE — PROGRESS NOTES
Progress Note    Admit Date:  2/20/2023    -pmhx CHF, CKD, CAD, HTN, HLD, HENNA   -presented with 20 lb weight gain in the past week   -unsure if he was getting diuretics   -hypoxic requiring 4 L O2 --> 1 L O2 --> on RA     Subjective:  Mr. Candelario Brink today is doing well, feels like his shortness of breath and swelling has improved. He is on room air, having good urine output. Down to 230 lb today   Seen sitting up in the chair. Objective:     Vitals:    02/21/23 2004 02/22/23 0224 02/22/23 0458 02/22/23 0821   BP: (!) 120/52 (!) 120/49  123/62   Pulse: 50 53  56   Resp: 18 19  20   Temp: 98.9 °F (37.2 °C) 97.7 °F (36.5 °C)  97.9 °F (36.6 °C)   TempSrc: Oral Oral  Oral   SpO2: 96% 96%  94%   Weight:   230 lb 11.2 oz (104.6 kg)    Height:             No data found. Intake/Output Summary (Last 24 hours) at 2/22/2023 1354  Last data filed at 2/22/2023 1019  Gross per 24 hour   Intake 1102 ml   Output 3660 ml   Net -2558 ml       Physical Exam:    Gen: No distress. Alert. Pleasant male   Eyes: PERRL. No sclera icterus. No conjunctival injection. Neck: No JVD. Trachea midline. Resp: No accessory muscle use. No crackles. No wheezes. No rhonchi. +Diminished breath sounds   CV: +Bradycardic rate. Regular rhythm. + murmur. No rub. +bilateral lower extremity pitting edema, appears chronic   Peripheral Pulses: +2 palpable, equal bilaterally   GI: Non-tender. Normal bowel sounds. +Distended but soft   Skin: Warm and dry. No nodule on exposed extremities. No rash on exposed extremities. M/S: No cyanosis. No joint deformity. No clubbing. Neuro: Awake. Grossly nonfocal    Psych: Oriented x 3. No anxiety or agitation.          Medications:  sodium chloride flush, 5-40 mL, 2 times per day  enoxaparin, 30 mg, BID  insulin lispro, 0-4 Units, TID WC  insulin lispro, 0-4 Units, Nightly  allopurinol, 300 mg, Daily  atorvastatin, 80 mg, Nightly  [Held by provider] carvedilol, 3.125 mg, BID WC  [Held by provider] docusate sodium, 100 mg, BID  ferrous sulfate, 325 mg, BID  insulin glargine, 40 Units, Daily  linaclotide, 290 mcg, QAM AC  spironolactone, 25 mg, Daily  brimonidine, 1 drop, BID  timolol, 1 drop, BID    PRN Medications:  sodium chloride, 1 spray, Q2H PRN  oxymetazoline, 2 spray, BID PRN  sodium chloride flush, 10 mL, PRN  sodium chloride, , PRN  ondansetron, 4 mg, Q8H PRN   Or  ondansetron, 4 mg, Q6H PRN  polyethylene glycol, 17 g, Daily PRN  acetaminophen, 650 mg, Q6H PRN   Or  acetaminophen, 650 mg, Q6H PRN  potassium chloride, 40 mEq, PRN   Or  potassium alternative oral replacement, 40 mEq, PRN   Or  potassium chloride, 10 mEq, PRN  magnesium sulfate, 2,000 mg, PRN  glucose, 4 tablet, PRN  dextrose bolus, 125 mL, PRN   Or  dextrose bolus, 250 mL, PRN  glucagon (rDNA), 1 mg, PRN  dextrose, , Continuous PRN  cyclobenzaprine, 5 mg, Q8H PRN  pregabalin, 25 mg, Q12H PRN        Data:  CBC:   Recent Labs     02/20/23  0907 02/21/23  0511 02/22/23  0545   WBC 7.0 5.9 6.2   HGB 11.7* 12.0* 12.7*   HCT 36.2* 36.1* 38.2*   MCV 87.4 86.6 86.6    152 150     BMP:   Recent Labs     02/20/23  0907 02/21/23  0511 02/22/23  0544   * 140 130*   K 4.1 4.0 3.6  3.6   CL 93* 100 92*   CO2 31 27 30   PHOS  --   --  3.5   * 109* 99*   CREATININE 1.7* 1.3 1.6*     LIVER PROFILE:   Recent Labs     02/20/23  0907   AST 19   ALT 12   LIPASE 24.0   BILITOT 0.6   ALKPHOS 121     PT/INR:   Recent Labs     02/21/23  1247   PROTIME 16.3*   INR 1.33*       CULTURES  COVID and Flu not detected      RADIOLOGY  US ABDOMEN LIMITED   Final Result   No evidence of abdominal or pelvic ascites. XR CHEST PORTABLE   Final Result   CHF and pulmonary edema.                Assessment/Plan:  #Acute on chronic diastolic CHF  #Acute hypoxic respiratory failure  -reportedly recently taken off diuretics 2/2 decline in kidney function  -newly requiring 4L O2, no supplemental O2 needs at baseline--> weaned down to 1 L O2 today --> now on RA -BNP >2k  -CXR shows pulmonary edema   -last echo EF 50-55%  -lasix 40 mg BID--> lasix 80 mg BID IV   -low Na diet, strict I&Os, daily weights  -last dry weight per cardiology note in Dec 2022 was 224, he is 0 today   -has had -2 L output   -cardiology consulted  -nephrology consulted   -US guided paracentesis ordered at request of cardiology --> not able to take any fluid off   -can transition to oral torsemide tomorrow AM   -seems to be close to euvolemia, and almost at base weight      #Bradycardia  -HR 40-50s  -holding coreg   -HR has improved since holding BB --> resumed coreg   -cardiology consulted      #MARGARITO on CKD  -Cr 1.7,   -slightly up from baseline--> 1.6   -continue to monitor with diuresis   -nephrology consulted     #Diarrhea  Resolved   -3 episodes diarrhea yesterday after drinking shake  -c diff negative   -imodium prn    -holding home bowel regimen   -no diarrhea today     #Hyponatremia   -resolved      #HLD  #CAD, s/p CABG  -chronically elevated troponin, trending   -flat elevation at 0.05  -EKG without acute ischemic change  -beta blocker, continue statin      #HTN  -now with softer pressures  -holding coreg   -resumed coreg and aldactone      #Type II DM  -lantus 40 daily + SSI  -carb control diet  -monitor POC glucose     #Anemia  -chronic, stable  -continue iron supplement     #Gout   -on allopurinol     DVT Prophylaxis: Lovenox   Diet: ADULT DIET; Regular; 4 carb choices (60 gm/meal);  No Added Salt (3-4 gm); 1800 ml  Code Status: EYAD Clarke  02/22/23  1:54 PM

## 2023-02-22 NOTE — PROGRESS NOTES
[facility]. Transfer of care done at this time.

## 2023-02-22 NOTE — PLAN OF CARE
Problem: Discharge Planning  Goal: Discharge to home or other facility with appropriate resources  2/22/2023 0554 by Jane Oneal RN  Outcome: Progressing  2/21/2023 1801 by Tyson Wolfe RN  Outcome: Progressing  2/21/2023 1800 by Tyson Wolfe RN  Outcome: Not Progressing     Problem: Pain  Goal: Verbalizes/displays adequate comfort level or baseline comfort level  2/22/2023 0554 by Jane Oneal RN  Outcome: Progressing  2/21/2023 1801 by Tyson Wolfe RN  Outcome: Progressing  2/21/2023 1800 by Tyson Wolfe RN  Outcome: Not Progressing     Problem: Safety - Adult  Goal: Free from fall injury  2/22/2023 0554 by Jane Oenal RN  Outcome: Progressing  2/21/2023 1801 by Tyson Wolfe RN  Outcome: Progressing  2/21/2023 1800 by Tyson Wolfe RN  Outcome: Not Progressing     Problem: ABCDS Injury Assessment  Goal: Absence of physical injury  2/22/2023 0554 by Jane Oneal RN  Outcome: Progressing  2/21/2023 1801 by Tyson Wolfe RN  Outcome: Progressing  2/21/2023 1800 by Tyson Wolfe RN  Outcome: Not Progressing     Problem: Chronic Conditions and Co-morbidities  Goal: Patient's chronic conditions and co-morbidity symptoms are monitored and maintained or improved  2/22/2023 0554 by Jane Oneal RN  Outcome: Progressing  Note:   HEART FAILURE CARE PLAN:    Comorbidities Reviewed: Yes   Patient has a past medical history of Anemia, Angina, Arthritis, CAD (coronary artery disease), CHF (congestive heart failure) ([facility] [address] CKD (chronic kidney disease) stage 3, GFR 30-59 ml/min ([facility] [address] Clostridium difficile diarrhea, Diabetes mellitus ([address] Diabetic neuropathy ([address] Disease of blood and blood forming organ, Dizziness, GERD (gastroesophageal reflux disease), Headache, Hearing loss, Hyperlipidemia, Hypertension, Kidney stone, Refusal of blood transfusions as patient is Orthodoxy, Sleep apnea, Tinnitus, Venous ulcer ([address] and Wound, open. ECHOCARDIOGRAM Reviewed:  Yes Patient's Ejection Fraction (EF) is greater than 40%    Weights Reviewed: Yes   Admission weight: 242 lb (109.8 kg)   Wt Readings from Last 3 Encounters:   02/22/23 230 lb 11.2 oz (104.6 kg)   02/06/23 227 lb (103 kg)   11/02/22 221 lb (100.2 kg)     Intake & Output Reviewed: Yes     Intake/Output Summary (Last 24 hours) at 2/22/2023 0554  Last data filed at 2/22/2023 4621  Gross per 24 hour   Intake 1817 ml   Output 3760 ml   Net -1943 ml     Medications Reviewed: Yes   SCHEDULED HOSPITAL MEDICATIONS:   furosemide  80 mg IntraVENous BID    sodium chloride flush  5-40 mL IntraVENous 2 times per day    enoxaparin  30 mg SubCUTAneous BID    insulin lispro  0-4 Units SubCUTAneous TID WC    insulin lispro  0-4 Units SubCUTAneous Nightly    allopurinol  300 mg Oral Daily    atorvastatin  80 mg Oral Nightly    [Held by provider] carvedilol  3.125 mg Oral BID WC    [Held by provider] docusate sodium  100 mg Oral BID    ferrous sulfate  325 mg Oral BID    insulin glargine  40 Units SubCUTAneous Daily    linaclotide  290 mcg Oral QAM AC    Netarsudil Dimesylate  1 drop Left Eye Nightly    spironolactone  25 mg Oral Daily    brimonidine  1 drop Left Eye BID    timolol  1 drop Left Eye BID     ACE/ARB/ARNI is REQUIRED for EF </= 33% SYSTOLIC FAILURE:   ACE[de-identified] None  ARB[de-identified] None  ARNI[de-identified] None    Evidenced-Based Beta Blocker is REQUIRED for EF </= 56% SYSTOLIC FAILURE:   [de-identified] Carvedilol- Coreg    Diuretics:  [de-identified] Furosemide and Spironolactone    Diet Reviewed: Yes   ADULT DIET; Regular; 4 carb choices (60 gm/meal); No Added Salt (3-4 gm); 1800 ml    Goal of Care Reviewed: Yes   Patient and/or Family's stated Goal of Care this Admission: reduce shortness of breath, increase activity tolerance, better understand heart failure and disease management, be more comfortable, and reduce lower extremity edema prior to discharge.     2/21/2023 1801 by Bhupendra Davalos RN  Outcome: Progressing  2/21/2023 1800 by Bhupendra Davalos RN  Outcome: Not Progressing     Problem: Cardiovascular - Adult  Goal: Maintains optimal cardiac output and hemodynamic stability  2/22/2023 0554 by Kenny De La Vega RN  Outcome: Progressing  2/21/2023 1801 by Khushbu Aguilar RN  Outcome: Progressing  2/21/2023 1800 by Khushbu Aguilar RN  Outcome: Not Progressing  Goal: Absence of cardiac dysrhythmias or at baseline  2/22/2023 0554 by Kenny De La Vega RN  Outcome: Progressing  2/21/2023 1801 by Khushbu Aguilar RN  Outcome: Progressing  2/21/2023 1800 by Khushbu Aguilar RN  Outcome: Not Progressing     Problem: Discharge Planning  Goal: Discharge to home or other facility with appropriate resources  2/22/2023 0554 by Kenny De La Vega RN  Outcome: Progressing  2/21/2023 1801 by Khushbu Aguilar RN  Outcome: Progressing  2/21/2023 1800 by Khushbu Aguilar RN  Outcome: Not Progressing     Problem: Pain  Goal: Verbalizes/displays adequate comfort level or baseline comfort level  2/22/2023 0554 by Kenny De La Vega RN  Outcome: Progressing  2/21/2023 1801 by Khushbu Aguilar RN  Outcome: Progressing  2/21/2023 1800 by Khushbu Aguilar RN  Outcome: Not Progressing     Problem: Safety - Adult  Goal: Free from fall injury  2/22/2023 0554 by Kenny De La Vega RN  Outcome: Progressing  2/21/2023 1801 by Khushbu Aguilar RN  Outcome: Progressing  2/21/2023 1800 by Khushbu Aguilar RN  Outcome: Not Progressing     Problem: ABCDS Injury Assessment  Goal: Absence of physical injury  2/22/2023 0554 by Kenny De La Vega RN  Outcome: Progressing  2/21/2023 1801 by Khushbu Aguilar RN  Outcome: Progressing  2/21/2023 1800 by Khushbu Aguilar RN  Outcome: Not Progressing     Problem: Chronic Conditions and Co-morbidities  Goal: Patient's chronic conditions and co-morbidity symptoms are monitored and maintained or improved  2/22/2023 0554 by Kenny De La Vega RN  Outcome: Progressing  Note:   HEART FAILURE CARE PLAN:    Comorbidities Reviewed: Yes   Patient has a past medical history of Anemia, Angina, Arthritis, CAD (coronary artery disease), CHF (congestive heart failure) ([facility] CKD (chronic kidney disease) stage 3, GFR 30-59 ml/min ([facility]), Clostridium difficile diarrhea, Diabetes mellitus ([facility] Diabetic neuropathy ([facility] Disease of blood and blood forming organ, Dizziness, GERD (gastroesophageal reflux disease), Headache, Hearing loss, Hyperlipidemia, Hypertension, Kidney stone, Refusal of blood transfusions as patient is Amish, Sleep apnea, Tinnitus, Venous ulcer ([facility] and Wound, open. ECHOCARDIOGRAM Reviewed: Yes   Patient's Ejection Fraction (EF) is greater than 40%    Weights Reviewed:  Yes   Admission weight: 242 lb (109.8 kg)   Wt Readings from Last 3 Encounters:   02/22/23 230 lb 11.2 oz (104.6 kg)   02/06/23 227 lb (103 kg)   11/02/22 221 lb (100.2 kg)     Intake & Output Reviewed: Yes     Intake/Output Summary (Last 24 hours) at 2/22/2023 0554  Last data filed at 2/22/2023 9926  Gross per 24 hour   Intake 1817 ml   Output 3760 ml   Net -1943 ml     Medications Reviewed: Yes   SCHEDULED HOSPITAL MEDICATIONS:   furosemide  80 mg IntraVENous BID    sodium chloride flush  5-40 mL IntraVENous 2 times per day    enoxaparin  30 mg SubCUTAneous BID    insulin lispro  0-4 Units SubCUTAneous TID WC    insulin lispro  0-4 Units SubCUTAneous Nightly    allopurinol  300 mg Oral Daily    atorvastatin  80 mg Oral Nightly    [Held by provider] carvedilol  3.125 mg Oral BID WC    [Held by provider] docusate sodium  100 mg Oral BID    ferrous sulfate  325 mg Oral BID    insulin glargine  40 Units SubCUTAneous Daily    linaclotide  290 mcg Oral QAM AC    Netarsudil Dimesylate  1 drop Left Eye Nightly    spironolactone  25 mg Oral Daily    brimonidine  1 drop Left Eye BID    timolol  1 drop Left Eye BID     ACE/ARB/ARNI is REQUIRED for EF </= 28% SYSTOLIC FAILURE:   ACE[de-identified] None  ARB[de-identified] None  ARNI[de-identified] None    Evidenced-Based Beta Blocker is REQUIRED for EF </= 21% SYSTOLIC FAILURE:   [de-identified] Carvedilol- Coreg    Diuretics:  [de-identified] Furosemide and Spironolactone    Diet Reviewed: Yes   ADULT DIET; Regular; 4 carb choices (60 gm/meal); No Added Salt (3-4 gm); 1800 ml    Goal of Care Reviewed: Yes   Patient and/or Family's stated Goal of Care this Admission: reduce shortness of breath, increase activity tolerance, better understand heart failure and disease management, be more comfortable, and reduce lower extremity edema prior to discharge.     2/21/2023 1801 by Jesús Bhatia RN  Outcome: Progressing  2/21/2023 1800 by Jesús Bhatia RN  Outcome: Not Progressing     Problem: Cardiovascular - Adult  Goal: Maintains optimal cardiac output and hemodynamic stability  2/22/2023 0554 by Gladys Ashby RN  Outcome: Progressing  2/21/2023 1801 by Jesús Bhatia RN  Outcome: Progressing  2/21/2023 1800 by Jesús Bhatia RN  Outcome: Not Progressing  Goal: Absence of cardiac dysrhythmias or at baseline  2/22/2023 0554 by Gladys Ashby RN  Outcome: Progressing  2/21/2023 1801 by Jesús Bhatia RN  Outcome: Progressing  2/21/2023 1800 by Jesús Bhatia RN  Outcome: Not Progressing

## 2023-02-22 NOTE — PROGRESS NOTES
Progress Note    HISTORY     CC:   Abnormal labs           We are following for CKD and diuretic management        Subjective/   HPI:  He is doing better. He is on room air. Edema is mild. Scr is 1.6.       ROS:  Constitutional:  No fevers, No Chills, + weakness  Cardiovascular:  No palpations, + edema  Respiratory:  No wheezing, no cough  Skin:  No rash, no itching  :  No hematuria, no dysuria     Social Hx:  No Family at the bedside     Past Medical and Surgical History:  - Reviewed, no changes     EXAM       Objective/     Vitals:    02/21/23 2004 02/22/23 0224 02/22/23 0458 02/22/23 0821   BP: (!) 120/52 (!) 120/49  123/62   Pulse: 50 53  56   Resp: 18 19  20   Temp: 98.9 °F (37.2 °C) 97.7 °F (36.5 °C)  97.9 °F (36.6 °C)   TempSrc: Oral Oral  Oral   SpO2: 96% 96%  94%   Weight:   230 lb 11.2 oz (104.6 kg)    Height:         24HR INTAKE/OUTPUT:    Intake/Output Summary (Last 24 hours) at 2/22/2023 1344  Last data filed at 2/22/2023 1019  Gross per 24 hour   Intake 1102 ml   Output 3660 ml   Net -2558 ml     Constitutional:  Alert, awake, no apparent distress  Eyes:  Pupils reactive, sclera clear   Neck:  Normal thyroid, no masses   Cardiovascular:  Regular, no rub  Respiratory:  No distress, no wheezing  Psychiatry:  Appropriate mood/affect, alert  Abdomen: +bs, soft, nt, no masses   Musculoskeletal: + LE edema, no clubbing   Lymphatics:  No LAD in neck, no supraclavicular nodes       MEDICAL DECISION MAKING       Data/  Recent Labs     02/20/23  0907 02/21/23  0511 02/22/23  0545   WBC 7.0 5.9 6.2   HGB 11.7* 12.0* 12.7*   HCT 36.2* 36.1* 38.2*   MCV 87.4 86.6 86.6    152 150     Recent Labs     02/20/23  0907 02/20/23  1249 02/21/23  0511 02/22/23  0544   *  --  140 130*   K 4.1  --  4.0 3.6  3.6   CL 93*  --  100 92*   CO2 31  --  27 30   GLUCOSE 145* 134 138* 292*   PHOS  --   --   --  3.5   *  --  109* 99*   CREATININE 1.7*  --  1.3 1.6*   LABGLOM 43*  --  59* 46* Assessment/     Chronic Kidney Disease:  Stage 3              Baseline ~ 1.7              Stable. High BUN due to diuretic requirements to maintain volume status   Diastolic Heart Failure:              Patient seen in the office after a hospital stay where he required paracentesis.   His Scr in 12/2022 was 1.2 but he was fluid overloaded so restarted on torsemide 150 mg po daily   Hypokalemia:               Potassium as been stable, he is off K supplements and having labs followed closely at the nursing home     Plan/     I think he is well compensated, his current weight is 230 lbs so I suspect that 228 - 235 lbs is his target weight  Would suggest trying torsemide 100 mg po daily to keep in target range with metolazone PRN weight > 235 lbs  D/c IV lasix  Perhaps discharge planning tomorrow     Thank you for asking us to participate in the management of your patient, please do not hesitate to contact me for any concerns regarding my recommendations as outlined above.    -----------------------------  Munira Flores M.D.   [facility]

## 2023-02-22 NOTE — PROGRESS NOTES
Pt sitting up in chair watching TV. He denies any pain or SOB. Assessment complete-see flowsheet. Medications given-see MAR. Pt denies further needs, call light and bedside table within reach. Will continue to monitor.

## 2023-02-22 NOTE — PROGRESS NOTES
[facility]  Cardiology  Progress Note    Admission date:  2023    Reason for follow up visit: CHF    HPI/CC: Enma Quintero is a 75 y.o. male who presented 2023 for weight gain. He has been treated for acute on chronic HFpEF and A/CKD. Rhythm has been sinus 50s-60s. Subjective: Has shoulder pain but SOB improved, on room air. No chest pain. Vitals:  Blood pressure 123/62, pulse 56, temperature 97.9 °F (36.6 °C), temperature source Oral, resp. rate 20, height 5' 9\" (1.753 m), weight 230 lb 11.2 oz (104.6 kg), SpO2 94 %.   Temp  Av.2 °F (36.8 °C)  Min: 97.7 °F (36.5 °C)  Max: 98.9 °F (37.2 °C)  Pulse  Av.6  Min: 50  Max: 60  BP  Min: 120/49  Max: 123/62  SpO2  Av.6 %  Min: 88 %  Max: 99 %    24 hour I/O    Intake/Output Summary (Last 24 hours) at 2023 1002  Last data filed at 2023 0438  Gross per 24 hour   Intake 1397 ml   Output 3360 ml   Net -1963 ml     Current Facility-Administered Medications   Medication Dose Route Frequency Provider Last Rate Last Admin    sodium chloride (OCEAN, BABY AYR) 0.65 % nasal spray 1 spray  1 spray Each Nostril Q2H PRN EYAD Rodriguez   1 spray at 23 1245    furosemide (LASIX) injection 80 mg  80 mg IntraVENous BID Jett Gonzales MD   80 mg at 23 0806    oxymetazoline (AFRIN) 0.05 % nasal spray 2 spray  2 spray Each Nostril BID PRN EYAD Rodriguez   2 spray at 23 1711    sodium chloride flush 0.9 % injection 5-40 mL  5-40 mL IntraVENous 2 times per day EYAD Rodriguez   10 mL at 23 0809    sodium chloride flush 0.9 % injection 10 mL  10 mL IntraVENous PRN EYAD Beyer        0.9 % sodium chloride infusion   IntraVENous PRN EYDA Rodriguez        ondansetron (ZOFRAN-ODT) disintegrating tablet 4 mg  4 mg Oral Q8H PRN EYAD Rodriguez        Or    ondansetron (ZOFRAN) injection 4 mg  4 mg IntraVENous Q6H PRN EYAD Beyer        polyethylene glycol (GLYCOLAX) packet 17 g  17 g Oral Daily PRN EYAD Hansen        acetaminophen (TYLENOL) tablet 650 mg  650 mg Oral Q6H PRN EYAD Hansen        Or    acetaminophen (TYLENOL) suppository 650 mg  650 mg Rectal Q6H PRN EYAD Hansen        enoxaparin Sodium (LOVENOX) injection 30 mg  30 mg SubCUTAneous BID EYAD Hansen   30 mg at 02/22/23 0807    potassium chloride (KLOR-CON M) extended release tablet 40 mEq  40 mEq Oral PRN EYAD Hansen        Or    potassium bicarb-citric acid (EFFER-K) effervescent tablet 40 mEq  40 mEq Oral PRN EYAD Hansen        Or    potassium chloride 10 mEq/100 mL IVPB (Peripheral Line)  10 mEq IntraVENous PRN EYAD Hansen        magnesium sulfate 2000 mg in 50 mL IVPB premix  2,000 mg IntraVENous PRN EYAD Hansen        glucose chewable tablet 16 g  4 tablet Oral PRN EYAD Beyer        dextrose bolus 10% 125 mL  125 mL IntraVENous PRN EYAD Hansen        Or    dextrose bolus 10% 250 mL  250 mL IntraVENous PRN EYAD Beyer        glucagon (rDNA) injection 1 mg  1 mg SubCUTAneous PRN EYAD Beyer        dextrose 10 % infusion   IntraVENous Continuous PRN EYAD Beyer        insulin lispro (HUMALOG) injection vial 0-4 Units  0-4 Units SubCUTAneous TID  EYAD Hansen   1 Units at 02/22/23 0809    insulin lispro (HUMALOG) injection vial 0-4 Units  0-4 Units SubCUTAneous Nightly EYAD Beyer        allopurinol (ZYLOPRIM) tablet 300 mg  300 mg Oral Daily EYAD Beyer   300 mg at 02/22/23 0806    atorvastatin (LIPITOR) tablet 80 mg  80 mg Oral Nightly EYAD Hansen   80 mg at 02/21/23 2056    [Held by provider] carvedilol (COREG) tablet 3.125 mg  3.125 mg Oral BID  EYAD Beyer        cyclobenzaprine (FLEXERIL) tablet 5 mg  5 mg Oral Q8H PRN EYAD Hansen   5 mg at 02/21/23 2321    [Held by provider] docusate sodium (COLACE) capsule 100 mg  100 mg Oral BID EYAD Hansen        ferrous sulfate (IRON 325) tablet 325 mg  325 mg Oral BID Valdivia Hum, PA   325 mg at 02/22/23 0807    insulin glargine (LANTUS) injection vial 40 Units  40 Units SubCUTAneous Daily Valdivia Hum, PA   40 Units at 02/22/23 1773    linaclotide (LINZESS) capsule 290 mcg  290 mcg Oral QAM AC Chichi Charlesempo, PA   290 mcg at 02/22/23 0646    pregabalin (LYRICA) capsule 25 mg  25 mg Oral Q12H PRN Valdivia Hum, PA   25 mg at 02/21/23 2321    spironolactone (ALDACTONE) tablet 25 mg  25 mg Oral Daily Chichi Charlesempo, PA   25 mg at 02/22/23 0809    brimonidine (ALPHAGAN) 0.2 % ophthalmic solution 1 drop  1 drop Left Eye BID Valdivia Hum, PA   1 drop at 02/22/23 0809    timolol (TIMOPTIC) 0.5 % ophthalmic solution 1 drop  1 drop Left Eye BID Valdivia Hum, PA   1 drop at 02/22/23 0809     Review of Systems   Constitutional:  Positive for fatigue. Respiratory: Negative. Cardiovascular: Negative. Gastrointestinal: Negative. Neurological: Negative. Objective:     Telemetry monitor: SR    Physical Exam:  Constitutional:  Comfortable and alert, NAD, appears older than stated age, chronically ill  Eyes: PERRL, sclera nonicteric  Neck:  Supple, no masses, no thyroidmegaly, no JVD  Skin:  Warm and dry; no rash or lesions  Heart:  Regular, normal apex, S1 and S2 normal, no M/G/R  Lungs:  Normal respiratory effort; clear; no wheezing/rhonchi/rales  Abdomen: soft, non tender, + bowel sounds  Extremities:  Trace BLE edema  Neuro: alert and oriented, moves legs and arms equally, normal mood and affect    Data Reviewed:    Echo 8/2022:   Limited visualization due to body habitus. Definity® used for myocardial   border enhancement. Left ventricular systolic function is low normal with a visually estimated   ejection fraction of 50-55%. The left ventricle is normal in size with mild concentric hypertrophy. The interventricular septum during systole and diastole is flattened.  This   is consistent with right ventricular pressure and volume overload. Grade III diastolic dysfunction with elevated LV pressure. There is no evidence of a left ventricular thrombus. The right ventricle is mildly dilated. Right ventricular systolic function is reduced. The left atrium appears severely enlarged. The right atrium is mildly enlarged. The aortic valve appears functionally bicuspid with severely calcified and   fused left and non-coronary cusps. Moderate aortic stenosis and regurgitation. Mild mitral, pulmonic, and tricuspid regurgitation. Systolic pulmonary artery pressure (SPAP) is elevated and estimated at 81   mmHg (right atrial pressure 15 mmHg) consistent with severe pulmonary   hypertension. The IVC is dilated in size (>2.1 cm) and collapses <50% with respiration   consistent with markedly elevated right atrial pressures (15 mmHg) . Echo 1/2022  Limited only f/u for LVEF and aortic valve stenosis. Technically difficult examination. Low normal left ventricular systolic function with ejection fraction of 50%. Moderate aortic stenosis. Aortic stenosis values appear approximately same when compared to echo on 8-. Mild to moderate aortic regurgitation. Stress test 3/10/22       Summary    Abnormal moderate risk myocardial perfusion study. There is a large area of mixed ischemia and scar within the infero-apical,    apical-lateral, lateral, posterior-lateral, and posterior-basal segments. The left ventricular size is moderately dilated. The estimated left ventricular function is 67%. Echo 8/25/2021:   Technically difficult examination. Normal left ventricular systolic function with ejection fraction of 55-60%. Paradoxic septal motion   Mild concentric left ventricular hypertrophy. Left ventricular diastolic filling pressure is elevated. Compared to previous study from 4- no changes noted in left   ventricular function. Mild mitral and pulmonic regurgitation.    Bi-atrial enlargement. Moderate aortic stenosis, difficult to fully evaluate on this study   The right ventricle is moderately enlarged. Right ventricular systolic function is moderately reduced . Mild to moderate tricuspid regurgitation. Systolic pulmonic artery pressure (SPAP) is estimated at 65 mmHg consistent   with severe pulmonary hypertension (Right atrial pressure of 15 mmHg),   however difficult to accurately measure pressures on this study. The right atrium is moderately dilated. Coronary angiogram 2012:  1. The right coronary artery proximally has an 80% lesion. Distally there  is a 90% lesion. 2.  The left main artery has luminal irregularities. The LAD is occluded  proximally. 3.  The circumflex artery has 2 large marginal arteries that have diffuse 40%  lesion. 4.  The KAVYA to the distal LAD is widely patent. 5.  The SVG to the first diagonal is widely patent. 6.  The SVG to the RPL is widely patent. IMPRESSION:   1. Normal left ventricular function with normal cardiac output. 2.  Severely elevated left ventricular end diastolic pressure. 3.  Normal cardiac output. 4.  Severe pulmonary venous hypertension. PLAN:    1. Continue diuresis and afterload reduction. 2.  Salt restriction. 3.  Evaluation with a sleep study.       Lab Reviewed:     Renal Profile:  Lab Results   Component Value Date/Time    CREATININE 1.6 02/22/2023 05:44 AM    BUN 99 02/22/2023 05:44 AM     02/22/2023 05:44 AM    K 3.6 02/22/2023 05:44 AM    K 3.6 02/22/2023 05:44 AM    CL 92 02/22/2023 05:44 AM    CO2 30 02/22/2023 05:44 AM     CBC:    Lab Results   Component Value Date/Time    WBC 6.2 02/22/2023 05:45 AM    RBC 4.41 02/22/2023 05:45 AM    HGB 12.7 02/22/2023 05:45 AM    HCT 38.2 02/22/2023 05:45 AM    MCV 86.6 02/22/2023 05:45 AM    RDW 17.1 02/22/2023 05:45 AM     02/22/2023 05:45 AM     BNP:    Lab Results   Component Value Date/Time    PROBNP 1,695 02/22/2023 05:44 AM PROBNP 2,682 02/20/2023 09:07 AM    PROBNP 2,082 10/29/2022 12:41 PM    PROBNP 2,317 10/24/2022 11:51 AM    PROBNP 6,892 08/24/2022 12:16 PM     Fasting Lipid Panel:    Lab Results   Component Value Date/Time    CHOL 90 02/21/2023 05:11 AM    HDL 30 02/21/2023 05:11 AM    HDL 31 01/11/2012 06:04 AM    TRIG 110 02/21/2023 05:11 AM     Cardiac Enzymes:  CK/MbTroponin  Lab Results   Component Value Date/Time    CKTOTAL 149 11/16/2012 09:26 PM    TROPONINI 0.05 02/20/2023 05:42 PM     PT/ INR   Lab Results   Component Value Date/Time    INR 1.33 02/21/2023 12:47 PM    INR 1.40 08/29/2022 09:58 AM    INR 1.31 08/29/2022 06:56 AM    PROTIME 16.3 02/21/2023 12:47 PM    PROTIME 17.0 08/29/2022 09:58 AM    PROTIME 16.1 08/29/2022 06:56 AM     PTT No results found for: PTT   Lab Results   Component Value Date/Time    MG 1.8 12/13/2022 12:00 AM      Lab Results   Component Value Date/Time    TSH 1.31 02/20/2023 09:07 AM     All labs and imaging reviewed today    Assessment:  Acute on chronic diastolic CHF: improved  CAD: abnormal stress test 3/10/2022; patent grafts on angiogram 2012; s/p CABG 2011  Aortic stenosis/regurgitation: moderate on echo 8/2022  Atrial tachycardia: stable  Pulmonary HTN: severe  HTN  HLD  DM  CKD  HENNA  Obesity  Anemia    Plan:   1. Good diuresis and weight down to 230 lbs today; agree with transitioning to torsemide 100 mg daily tomorrow  2. Continue statin, spironolactone   3. Resume carvedilol  4. Monitor renal function, I/Os, weights  5. Cardiology will sign off, please call if needed.  He will follow up with PATRICK Camarena CNP-CNP  [facility]  [phone]

## 2023-02-22 NOTE — CARE COORDINATION
INTERDISCIPLINARY PLAN OF CARE CONFERENCE    Date/Time: 2/22/2023 9:12 AM  Completed by: Jd Rehman RN, Case Management      Patient Name:  Araseli Lizama  YOB: 1955  Admitting Diagnosis: Weight gain [R63.5]  Elevated troponin [R77.8]  Acute respiratory failure with hypoxia ([facility] [address] [J96.01]  Acute heart failure, unspecified heart failure type ([address] [I50.9]  Chronic kidney disease, unspecified CKD stage [N18.9]  Acute on chronic congestive heart failure, unspecified heart failure type ([address] [I50.9]     Admit Date/Time:  2/20/2023  8:19 AM    Chart reviewed. Interdisciplinary team contacted or reviewed plan related to patient progress and discharge plans. Disciplines included Case Management, Nursing, and Dietitian. Current Status:inpt  PT/OT recommendation for discharge plan of care: tbd    Expected D/C Disposition:  Nursing Home    Discharge Plan Comments: Reviewed chart. Pt from [facility]. CM spoke with Marija Vazquez from [facility] yesterday and she states bed available at d/c and no pre-cert needed unless skilled services needed. Will require rapid C-19 test within 48 hrs of d/c. Following.     Home O2 in place on admit: per facility

## 2023-02-23 VITALS
SYSTOLIC BLOOD PRESSURE: 129 MMHG | TEMPERATURE: 98.5 F | WEIGHT: 231.5 LBS | OXYGEN SATURATION: 92 % | BODY MASS INDEX: 34.29 KG/M2 | DIASTOLIC BLOOD PRESSURE: 53 MMHG | RESPIRATION RATE: 18 BRPM | HEIGHT: 69 IN | HEART RATE: 91 BPM

## 2023-02-23 LAB
ALBUMIN SERPL-MCNC: 4 G/DL (ref 3.4–5)
ANION GAP SERPL CALCULATED.3IONS-SCNC: 12 MMOL/L (ref 3–16)
BASOPHILS ABSOLUTE: 0 K/UL (ref 0–0.2)
BASOPHILS RELATIVE PERCENT: 0.5 %
BUN BLDV-MCNC: 91 MG/DL (ref 7–20)
CALCIUM SERPL-MCNC: 9.8 MG/DL (ref 8.3–10.6)
CHLORIDE BLD-SCNC: 99 MMOL/L (ref 99–110)
CO2: 26 MMOL/L (ref 21–32)
CREAT SERPL-MCNC: 1.3 MG/DL (ref 0.8–1.3)
EOSINOPHILS ABSOLUTE: 0.2 K/UL (ref 0–0.6)
EOSINOPHILS RELATIVE PERCENT: 4.1 %
GFR SERPL CREATININE-BSD FRML MDRD: 59 ML/MIN/{1.73_M2}
GLUCOSE BLD-MCNC: 212 MG/DL (ref 70–99)
GLUCOSE BLD-MCNC: 219 MG/DL (ref 70–99)
HCT VFR BLD CALC: 38 % (ref 40.5–52.5)
HEMOGLOBIN: 12.8 G/DL (ref 13.5–17.5)
LYMPHOCYTES ABSOLUTE: 1 K/UL (ref 1–5.1)
LYMPHOCYTES RELATIVE PERCENT: 16.5 %
MCH RBC QN AUTO: 28.9 PG (ref 26–34)
MCHC RBC AUTO-ENTMCNC: 33.8 G/DL (ref 31–36)
MCV RBC AUTO: 85.4 FL (ref 80–100)
MONOCYTES ABSOLUTE: 0.5 K/UL (ref 0–1.3)
MONOCYTES RELATIVE PERCENT: 8.7 %
NEUTROPHILS ABSOLUTE: 4.2 K/UL (ref 1.7–7.7)
NEUTROPHILS RELATIVE PERCENT: 70.2 %
PDW BLD-RTO: 17 % (ref 12.4–15.4)
PERFORMED ON: ABNORMAL
PHOSPHORUS: 3.7 MG/DL (ref 2.5–4.9)
PLATELET # BLD: 146 K/UL (ref 135–450)
PMV BLD AUTO: 8.7 FL (ref 5–10.5)
POTASSIUM REFLEX MAGNESIUM: 3.7 MMOL/L (ref 3.5–5.1)
POTASSIUM SERPL-SCNC: 3.7 MMOL/L (ref 3.5–5.1)
RBC # BLD: 4.45 M/UL (ref 4.2–5.9)
SARS-COV-2, NAAT: NOT DETECTED
SODIUM BLD-SCNC: 137 MMOL/L (ref 136–145)
WBC # BLD: 6 K/UL (ref 4–11)

## 2023-02-23 PROCEDURE — 36415 COLL VENOUS BLD VENIPUNCTURE: CPT

## 2023-02-23 PROCEDURE — 87635 SARS-COV-2 COVID-19 AMP PRB: CPT

## 2023-02-23 PROCEDURE — 6370000000 HC RX 637 (ALT 250 FOR IP): Performed by: INTERNAL MEDICINE

## 2023-02-23 PROCEDURE — 6370000000 HC RX 637 (ALT 250 FOR IP)

## 2023-02-23 PROCEDURE — 99238 HOSP IP/OBS DSCHRG MGMT 30/<: CPT

## 2023-02-23 PROCEDURE — 80069 RENAL FUNCTION PANEL: CPT

## 2023-02-23 PROCEDURE — 6360000002 HC RX W HCPCS

## 2023-02-23 PROCEDURE — 2580000003 HC RX 258

## 2023-02-23 PROCEDURE — 85025 COMPLETE CBC W/AUTO DIFF WBC: CPT

## 2023-02-23 RX ORDER — TORSEMIDE 100 MG/1
50 TABLET ORAL ONCE
Status: COMPLETED | OUTPATIENT
Start: 2023-02-23 | End: 2023-02-23

## 2023-02-23 RX ORDER — TORSEMIDE 100 MG/1
100 TABLET ORAL DAILY
Qty: 30 TABLET | Refills: 0
Start: 2023-02-24

## 2023-02-23 RX ORDER — TORSEMIDE 100 MG/1
100 TABLET ORAL DAILY
Status: DISCONTINUED | OUTPATIENT
Start: 2023-02-24 | End: 2023-02-23 | Stop reason: HOSPADM

## 2023-02-23 RX ORDER — INSULIN GLARGINE 100 [IU]/ML
INJECTION, SOLUTION SUBCUTANEOUS
Status: DISCONTINUED
Start: 2023-02-23 | End: 2023-02-23 | Stop reason: HOSPADM

## 2023-02-23 RX ORDER — METOLAZONE 5 MG/1
5 TABLET ORAL PRN
Qty: 30 TABLET | Refills: 0
Start: 2023-02-23

## 2023-02-23 RX ADMIN — INSULIN LISPRO 2 UNITS: 100 INJECTION, SOLUTION INTRAVENOUS; SUBCUTANEOUS at 08:25

## 2023-02-23 RX ADMIN — ALLOPURINOL 300 MG: 100 TABLET ORAL at 08:32

## 2023-02-23 RX ADMIN — SODIUM CHLORIDE, PRESERVATIVE FREE 10 ML: 5 INJECTION INTRAVENOUS at 08:52

## 2023-02-23 RX ADMIN — FERROUS SULFATE TAB 325 MG (65 MG ELEMENTAL FE) 325 MG: 325 (65 FE) TAB at 08:32

## 2023-02-23 RX ADMIN — INSULIN GLARGINE 40 UNITS: 100 INJECTION, SOLUTION SUBCUTANEOUS at 08:45

## 2023-02-23 RX ADMIN — CARVEDILOL 3.12 MG: 6.25 TABLET, FILM COATED ORAL at 08:32

## 2023-02-23 RX ADMIN — TIMOLOL MALEATE 1 DROP: 5 SOLUTION OPHTHALMIC at 08:34

## 2023-02-23 RX ADMIN — ENOXAPARIN SODIUM 30 MG: 100 INJECTION SUBCUTANEOUS at 08:32

## 2023-02-23 RX ADMIN — BRIMONIDINE TARTRATE 1 DROP: 2 SOLUTION OPHTHALMIC at 08:35

## 2023-02-23 RX ADMIN — TORSEMIDE 50 MG: 100 TABLET ORAL at 10:45

## 2023-02-23 RX ADMIN — LINACLOTIDE 290 MCG: 145 CAPSULE, GELATIN COATED ORAL at 06:23

## 2023-02-23 RX ADMIN — DOCUSATE SODIUM 100 MG: 100 CAPSULE, LIQUID FILLED ORAL at 08:32

## 2023-02-23 RX ADMIN — CYCLOBENZAPRINE 5 MG: 10 TABLET, FILM COATED ORAL at 03:14

## 2023-02-23 RX ADMIN — TORSEMIDE 50 MG: 100 TABLET ORAL at 08:32

## 2023-02-23 RX ADMIN — TIMOLOL MALEATE 1 DROP: 5 SOLUTION OPHTHALMIC at 08:33

## 2023-02-23 RX ADMIN — SPIRONOLACTONE 25 MG: 25 TABLET, FILM COATED ORAL at 08:33

## 2023-02-23 NOTE — PROGRESS NOTES
Hand off report given to  Valentino Belts, RN. Patient is stable showing no signs of distress and has no current needs at this time. Call light is in reach and bed is in lowest position. Care is transferred at this time.

## 2023-02-23 NOTE — FLOWSHEET NOTE
02/22/23 2030   Vital Signs   Temp 98.2 °F (36.8 °C)   Temp Source Oral   Heart Rate 54   Heart Rate Source Monitor   Resp 16   /69   MAP (Calculated) 92   BP Location Right upper arm   BP Method Automatic   Patient Position Sitting   Level of Consciousness 0   MEWS Score 1   Oxygen Therapy   SpO2 96 %   O2 Device None (Room air)   Shift assessment completed see flow sheet. Patient in bed alert and oriented X4. Patient on room air, showing no signs of distress. Evening medications given per order. No insuline needed. New gown and sheets provided. Patient has No other needs at this time. Call light in reach.

## 2023-02-23 NOTE — PROGRESS NOTES
Patient discharged to facility [facility] attempted to call report and was transferred to patients [place] in which no one answered.

## 2023-02-23 NOTE — CARE COORDINATION
DISCHARGE ORDER  Date/Time 2023 11:22 AM  Completed by: Vikram Estrada, RN, Case Management    Patient Name: Jaycob Saxena    : 1955      Admit order Date and Status: 23 INPT  Noted discharge order. (verify MD's last order for status of admission/Traditional Medicare 3 [place] Inpatient qualifying stay required for SNF)    Confirmed discharge plan with:              Patient:  Yes              When pt confirms DC plan does any support person need to be contacted by CM Yes if yes who______                      Discharge to Facility: [facility] phone number for staff giving report: [phone]   Pre-certification completed:    Hospital Exemption Notification (HENS) completed: n/a  Discharge orders and Continuity of Care faxed to facility:  facility to obtain from [facility]      Transportation:               Medical Transport explained with choice list offered to pt/family. Choice:(no preference)  Agency used: [employer]   time:   1130      Pt/family/Nursing/Facility aware of  time:   Yes Names: pt, nurse, facility  Ambulance form completed:  yes:      Date Last IMM Given: 23    Comments:-    Pt is being d/c'd to [facility] today. Pt's O2 sats are 92% on roomair. Discharge timeout done with Rico Locks. All discharge needs and concerns addressed. Discharging nurse to complete ARASH, reconcile AVS, and place final copy with patient's discharge packet. Discharging RN to ensure that written prescriptions for  Level II medications are sent with patient to the facility as per protocol.

## 2023-02-23 NOTE — DISCHARGE SUMMARY
Name:  Saúl Henrandez  Room:  3017/3017-01  MRN:    [id number]    Discharge Summary      This discharge summary is in conjunction with a complete physical exam done on the day of discharge. Discharging Provider: Shea Morales PA-C   Discharging Attending Physician: Dr. Jordan Sears: 2/20/2023  Discharge:  2/23/2023    HPI taken from admission H&P:    The patient is a 75 y.o. male with PMHx as below who presented to [facility] ED from [facility] with complaint of lethargy. Patient reports having blood work done at [facility], which showed elevated BUN and creatinine. He states he has had progressive lethargy over the last few days and had a 6 lb weight gain overnight. To his knowledge, he had been taking all his medications as prescribed, however, per ED note, patient stopped on diuretics for decline in kidney function. Denies fever, chills, nausea, vomiting, diarrhea, constipation, abdominal pain, chest pain, cough, or SOB.      Diagnoses this Admission and Hospital Course   #Acute on chronic diastolic CHF  #Acute hypoxic respiratory failure  -reportedly recently taken off diuretics 2/2 decline in kidney function  -newly requiring 4L O2, no supplemental O2 needs at baseline--> weaned down to 1 L O2 today --> now on RA   -BNP >2k  -CXR shows pulmonary edema   -last echo EF 50-55%  -lasix 40 mg BID--> lasix 80 mg BID IV   -low Na diet, strict I&Os, daily weights  -last dry weight per cardiology note in Dec 2022 was 224, he is 230 today   -has had -2 L output   -cardiology consulted  -nephrology consulted   -US guided paracentesis ordered at request of cardiology --> not able to take any fluid off   -can transition to oral torsemide tomorrow AM   -seems to be close to euvolemia, and almost at base weight     2/23  -pt appears to be at baseline weight, 131  -resume home torsemide today, continue 100 mg daily, continue aldactone, metolazone prn for weight gain   -Cr is 1.3 today      #Bradycardia  -HR 40-50s  -holding coreg   -HR has improved since holding BB --> resumed coreg   -cardiology consulted      #MARGARITO on CKD  -Cr 1.7,   -slightly up from baseline--> 1.6   -continue to monitor with diuresis   -nephrology consulted  -Cr 1.3 today   -will need labs at [facility]      #Diarrhea  Resolved   -3 episodes diarrhea yesterday after drinking shake  -c diff negative   -imodium prn    -holding home bowel regimen   -no diarrhea today      #Hyponatremia   -resolved      #HLD  #CAD, s/p CABG  -chronically elevated troponin, trending   -flat elevation at 0.05  -EKG without acute ischemic change  -beta blocker, continue statin      #HTN  -now with softer pressures  -holding coreg   -resumed coreg and aldactone   -stop imdur   -continue torsemide and aldactone, coreg      #Type II DM  -lantus 40 daily + SSI  -carb control diet  -monitor POC glucose     #Anemia  -chronic, stable  -continue iron supplement     #Gout   -on allopurinol     Procedures (Please Review Full Report for Details)  N/A    Consults    Nephrology   Cardiology       Physical Exam at Discharge:    /65   Pulse 55   Temp 98 °F (36.7 °C) (Oral)   Resp 20   Ht 5' 9\" (1.753 m)   Wt 231 lb 8 oz (105 kg)   SpO2 92%   BMI 34.19 kg/m²   Gen: No distress. Alert. Pleasant male   Eyes: PERRL. No sclera icterus. No conjunctival injection. Neck: No JVD. Trachea midline. Resp: No accessory muscle use. No crackles. No wheezes. No rhonchi. +Diminished breath sounds   CV: +Bradycardic rate. Regular rhythm. + murmur. No rub. +bilateral lower extremity pitting edema, appears chronic   Peripheral Pulses: +2 palpable, equal bilaterally   GI: Non-tender. Normal bowel sounds. +Distended but soft   Skin: Warm and dry. No nodule on exposed extremities. No rash on exposed extremities. M/S: No cyanosis. No joint deformity. No clubbing. Neuro: Awake. Grossly nonfocal    Psych: Oriented x 3. No anxiety or agitation.      Lab Results   Component Value Date    WBC 6.0 02/23/2023    HGB 12.8 (L) 02/23/2023    HCT 38.0 (L) 02/23/2023    MCV 85.4 02/23/2023     02/23/2023     Lab Results   Component Value Date     02/23/2023    K 3.7 02/23/2023    K 3.7 02/23/2023    CL 99 02/23/2023    CO2 26 02/23/2023    BUN 91 (HH) 02/23/2023    CREATININE 1.3 02/23/2023    GLUCOSE 212 (H) 02/23/2023    CALCIUM 9.8 02/23/2023    PROT 7.9 02/20/2023    LABALBU 4.0 02/23/2023    BILITOT 0.6 02/20/2023    ALKPHOS 121 02/20/2023    AST 19 02/20/2023    ALT 12 02/20/2023    LABGLOM 59 (A) 02/23/2023    GFRAA 43 (A) 09/01/2022    AGRATIO 1.0 (L) 02/20/2023    GLOB 3.3 08/23/2021           CULTURES  COVID and Flu not detected     RADIOLOGY  US ABDOMEN LIMITED   Final Result   No evidence of abdominal or pelvic ascites. XR CHEST PORTABLE   Final Result   CHF and pulmonary edema. Discharge Medications     Medication List        CHANGE how you take these medications      metOLazone 5 MG tablet  Commonly known as: ZAROXOLYN  Take 1 tablet by mouth as needed (more than 5 pound weight gain over 2 day time span OR weight >235 lbs) Thursday  What changed:   when to take this  reasons to take this     sodium chloride 0.65 % nasal spray  Commonly known as: JADA, BABY AYR  What changed: Another medication with the same name was removed. Continue taking this medication, and follow the directions you see here.      torsemide 100 MG tablet  Commonly known as: DEMADEX  Take 1 tablet by mouth daily  Start taking on: February 24, 2023  What changed: how much to take            CONTINUE taking these medications      acetaminophen 325 MG tablet  Commonly known as: TYLENOL     allopurinol 300 MG tablet  Commonly known as: ZYLOPRIM  Take 1 tablet by mouth daily     ascorbic acid 500 MG tablet  Commonly known as: VITAMIN C     atorvastatin 80 MG tablet  Commonly known as: LIPITOR  Take 1 tablet by mouth nightly     brimonidine-timolol 0.2-0.5 % ophthalmic solution  Commonly known as: COMBIGAN     carvedilol 3.125 MG tablet  Commonly known as: COREG  Take 1 tablet by mouth 2 times daily (with meals)     cyclobenzaprine 5 MG tablet  Commonly known as: FLEXERIL     docusate 100 MG Caps  Commonly known as: COLACE, DULCOLAX  Take 100 mg by mouth 2 times daily     dorzolamide 2 % ophthalmic solution  Commonly known as: TRUSOPT     ferrous sulfate 325 (65 Fe) MG tablet  Commonly known as: IRON 325     glucagon 1 MG injection     glucose 40 % Gel  Commonly known as: GLUTOSE     glycerin 2 g suppository     guaiFENesin 600 MG extended release tablet  Commonly known as: MUCINEX     insulin lispro (1 Unit Dial) 100 UNIT/ML Sopn  Commonly known as: HUMALOG/ADMELOG     Lantus SoloStar 100 UNIT/ML injection pen  Generic drug: insulin glargine     Linzess 290 MCG Caps capsule  Generic drug: linaclotide  Take 1 capsule by mouth every morning (before breakfast)     loperamide 2 MG capsule  Commonly known as: IMODIUM     magnesium hydroxide 400 MG/5ML suspension  Commonly known as: MILK OF MAGNESIA     magnesium oxide 400 (240 Mg) MG tablet  Commonly known as: MAG-OX  Take 1 tablet by mouth 2 times daily     Movantik 25 MG Tabs tablet  Generic drug: naloxegol     ondansetron 4 MG disintegrating tablet  Commonly known as: ZOFRAN-ODT     oxyCODONE-acetaminophen 5-325 MG per tablet  Commonly known as: PERCOCET     pantoprazole 40 MG tablet  Commonly known as: PROTONIX     polyethylene glycol 17 GM/SCOOP powder  Commonly known as: GLYCOLAX     potassium chloride 10 MEQ extended release tablet  Commonly known as: KLOR-CON M     pregabalin 50 MG capsule  Commonly known as: LYRICA     Rhopressa 0.02 % Soln  Generic drug: Netarsudil Dimesylate     senna 8.6 MG Tabs tablet  Commonly known as: SENOKOT  Take 1 tablet by mouth 2 times daily     silver sulfADIAZINE 1 % cream  Commonly known as: SILVADENE  Apply to BL lower leg ulcers daily     sodium phosphate 7-19 GM/118ML     spironolactone 25 MG tablet  Commonly known as: ALDACTONE  Take 1 tablet by mouth daily     Systane Ultra 0.4-0.3 % ophthalmic solution  Generic drug: polyethyl glycol-propyl glycol 0.4-0.3 %     tamsulosin 0.4 MG capsule  Commonly known as: FLOMAX     ZINC PO            STOP taking these medications      fluticasone 50 MCG/ACT nasal spray  Commonly known as: FLONASE     isosorbide mononitrate 30 MG extended release tablet  Commonly known as: IMDUR     meclizine 25 MG tablet  Commonly known as: ANTIVERT     naloxone 4 MG/0.1ML Liqd nasal spray     nitroGLYCERIN 0.4 MG SL tablet  Commonly known as: NITROSTAT     omeprazole 20 MG delayed release capsule  Commonly known as: PRILOSEC               Where to Get Your Medications        Information about where to get these medications is not yet available    Ask your nurse or doctor about these medications  metOLazone 5 MG tablet  torsemide 100 MG tablet           Discharged in stable condition back to [facility]     Follow Up:   Follow up with  physician at [place], [place]  02/23/23  9:49 AM

## 2023-02-23 NOTE — PLAN OF CARE
HEART FAILURE CARE PLAN:    Comorbidities Reviewed: Yes   Patient has a past medical history of Anemia, Angina, Arthritis, CAD (coronary artery disease), CHF (congestive heart failure) ([facility] [address] CKD (chronic kidney disease) stage 3, GFR 30-59 ml/min ([facility]), Clostridium difficile diarrhea, Diabetes mellitus ([facility] [address] Diabetic neuropathy ([facility] Disease of blood and blood forming organ, Dizziness, GERD (gastroesophageal reflux disease), Headache, Hearing loss, Hyperlipidemia, Hypertension, Kidney stone, Refusal of blood transfusions as patient is Baptism, Sleep apnea, Tinnitus, Venous ulcer ([facility] [address] and Wound, open. ECHOCARDIOGRAM Reviewed: Yes   Patient's Ejection Fraction (EF) is greater than 40%    Weights Reviewed:  Yes   Admission weight: 242 lb (109.8 kg)   Wt Readings from Last 3 Encounters:   02/22/23 230 lb 11.2 oz (104.6 kg)   02/06/23 227 lb (103 kg)   11/02/22 221 lb (100.2 kg)     Intake & Output Reviewed: Yes     Intake/Output Summary (Last 24 hours) at 2/22/2023 2152  Last data filed at 2/22/2023 2050  Gross per 24 hour   Intake 401 ml   Output 3725 ml   Net -3324 ml     Medications Reviewed: Yes   SCHEDULED HOSPITAL MEDICATIONS:   insulin lispro  0-8 Units SubCUTAneous TID WC    insulin lispro  0-4 Units SubCUTAneous Nightly    [START ON 2/23/2023] spironolactone  25 mg Oral Daily    [START ON 2/23/2023] torsemide  50 mg Oral BID    sodium chloride flush  5-40 mL IntraVENous 2 times per day    enoxaparin  30 mg SubCUTAneous BID    allopurinol  300 mg Oral Daily    atorvastatin  80 mg Oral Nightly    carvedilol  3.125 mg Oral BID WC    docusate sodium  100 mg Oral BID    ferrous sulfate  325 mg Oral BID    insulin glargine  40 Units SubCUTAneous Daily    linaclotide  290 mcg Oral QAM AC    brimonidine  1 drop Left Eye BID    timolol  1 drop Left Eye BID     ACE/ARB/ARNI is REQUIRED for EF </= 68% SYSTOLIC FAILURE:   ACE[de-identified] None  ARB[de-identified] None  ARNI[de-identified] None    Evidenced-Based Beta Blocker is REQUIRED for EF </= 54% SYSTOLIC FAILURE:   [de-identified] Carvedilol- Coreg    Diuretics:  [de-identified]  furosemide -now D/C, Torsemide, and Spironolactone future med 2/23    Diet Reviewed: Yes   ADULT DIET; Regular; 4 carb choices (60 gm/meal); No Added Salt (3-4 gm); 1800 ml    Goal of Care Reviewed: Yes   Patient and/or Family's stated Goal of Care this Admission: be more comfortable prior to discharge.

## 2023-02-23 NOTE — PLAN OF CARE
Problem: Pain  Goal: Verbalizes/displays adequate comfort level or baseline comfort level  Outcome: Progressing   Patient is able to state their pain level and express their needs.

## 2023-02-23 NOTE — FLOWSHEET NOTE
02/23/23 0109   Vital Signs   Temp 98 °F (36.7 °C)   Temp Source Oral   Heart Rate 55   Heart Rate Source Monitor   Resp 20   /65   MAP (Calculated) 86   BP Location Right upper arm   BP Method Automatic   Patient Position Semi fowlers; Lying right side   Level of Consciousness 0   MEWS Score 1   Oxygen Therapy   SpO2 92 %   O2 Device None (Room air)   Height and Weight   Weight 231 lb 8 oz (105 kg)   Weight Method Actual;Standing scale   BMI (Calculated) 34.3     Vitals taken, shown above. Patient is stable with no current needs at this time. Call light in reach. Bed in lowest position.

## 2023-02-23 NOTE — PLAN OF CARE
Problem: Discharge Planning  Goal: Discharge to home or other facility with appropriate resources  Outcome: Adequate for Discharge  Flowsheets (Taken 2/23/2023 0800)  Discharge to home or other facility with appropriate resources: Identify barriers to discharge with patient and caregiver     Problem: Pain  Goal: Verbalizes/displays adequate comfort level or baseline comfort level  2/23/2023 1049 by Hasmukh Jarrett RN  Outcome: Adequate for Discharge  2/23/2023 0034 by Twan Isaac RN  Outcome: Progressing     Problem: Safety - Adult  Goal: Free from fall injury  Outcome: Adequate for Discharge     Problem: ABCDS Injury Assessment  Goal: Absence of physical injury  Outcome: Adequate for Discharge     Problem: Chronic Conditions and Co-morbidities  Goal: Patient's chronic conditions and co-morbidity symptoms are monitored and maintained or improved  Outcome: Adequate for Discharge  Flowsheets (Taken 2/23/2023 0800)  Care Plan - Patient's Chronic Conditions and Co-Morbidity Symptoms are Monitored and Maintained or Improved: Monitor and assess patient's chronic conditions and comorbid symptoms for stability, deterioration, or improvement     Problem: Cardiovascular - Adult  Goal: Maintains optimal cardiac output and hemodynamic stability  Outcome: Adequate for Discharge  Goal: Absence of cardiac dysrhythmias or at baseline  2/23/2023 1049 by Hasmukh Jarrett RN  Outcome: Adequate for Discharge  2/23/2023 0032 by Twan Isaac RN  Outcome: Progressing

## 2023-02-23 NOTE — PROGRESS NOTES
Progress Note    HISTORY     CC:   Abnormal labs           We are following for CKD and diuretic management        Subjective/   HPI:  He is doing better. He is on room air. Edema is mild.   Scr is 1.3 and weight is 231 lbs     ROS:  Constitutional:  No fevers, No Chills, + weakness  Cardiovascular:  No palpations, + edema  Respiratory:  No wheezing, no cough  Skin:  No rash, no itching  :  No hematuria, no dysuria     Social Hx:  No Family at the bedside     Past Medical and Surgical History:  - Reviewed, no changes     EXAM       Objective/     Vitals:    02/22/23 0821 02/22/23 1648 02/22/23 2030 02/23/23 0109   BP: 123/62 (!) 116/52 138/69 127/65   Pulse: 56 60 54 55   Resp: 20  16 20   Temp: 97.9 °F (36.6 °C) 97.4 °F (36.3 °C) 98.2 °F (36.8 °C) 98 °F (36.7 °C)   TempSrc: Oral Oral Oral Oral   SpO2: 94% 94% 96% 92%   Weight:    231 lb 8 oz (105 kg)   Height:         24HR INTAKE/OUTPUT:    Intake/Output Summary (Last 24 hours) at 2/23/2023 9208  Last data filed at 2/23/2023 8627  Gross per 24 hour   Intake --   Output 2125 ml   Net -2125 ml       Constitutional:  Alert, awake, no apparent distress  Eyes:  Pupils reactive, sclera clear   Neck:  Normal thyroid, no masses   Cardiovascular:  Regular, no rub  Respiratory:  No distress, no wheezing  Psychiatry:  Appropriate mood/affect, alert  Abdomen: +bs, soft, nt, no masses   Musculoskeletal: + LE edema, no clubbing   Lymphatics:  No LAD in neck, no supraclavicular nodes       MEDICAL DECISION MAKING       Data/  Recent Labs     02/21/23  0511 02/22/23  0545 02/23/23  0511   WBC 5.9 6.2 6.0   HGB 12.0* 12.7* 12.8*   HCT 36.1* 38.2* 38.0*   MCV 86.6 86.6 85.4    150 146       Recent Labs     02/21/23  0511 02/22/23  0544 02/23/23  0511    130* 137   K 4.0 3.6  3.6 3.7  3.7    92* 99   CO2 27 30 26   GLUCOSE 138* 292* 212*   PHOS  --  3.5 3.7   * 99* 91*   CREATININE 1.3 1.6* 1.3   LABGLOM 59* 46* 59*         Assessment/ Chronic Kidney Disease:  Stage 3              Baseline ~ 1.7              Stable. High BUN due to diuretic requirements to maintain volume status   Diastolic Heart Failure:              Patient seen in the office after a hospital stay where he required paracentesis.   His Scr in 12/2022 was 1.2 but he was fluid overloaded so restarted on torsemide 150 mg po daily   Hypokalemia:               Potassium as been stable, he is off K supplements and having labs followed closely at the nursing home     Plan/     I think he is well compensated, his current weight is 230 lbs so I suspect that 228 - 235 lbs is his target weight  Would suggest trying torsemide 100 mg po daily to keep in target range with metolazone PRN weight > 235 lbs  Labs q 2 weeks to monitor renal function with daily weights  Ok for discharge     Thank you for asking us to participate in the management of your patient, please do not hesitate to contact me for any concerns regarding my recommendations as outlined above.    -----------------------------  Valencia Malone M.D.   [facility]

## 2023-03-20 ENCOUNTER — TELEPHONE (OUTPATIENT)
Dept: CARDIOLOGY CLINIC | Age: 68
End: 2023-03-20

## 2023-03-20 LAB
B-TYPE NATRIURETIC PEPTIDE: 257.1 PG/ML
BUN BLDV-MCNC: 103 MG/DL
CALCIUM SERPL-MCNC: 8.8 MG/DL
CHLORIDE BLD-SCNC: 96 MMOL/L
CO2: 33 MMOL/L
CREAT SERPL-MCNC: 1.5 MG/DL
EGFR: 46
GLUCOSE BLD-MCNC: 340 MG/DL
POTASSIUM SERPL-SCNC: 4 MMOL/L
SODIUM BLD-SCNC: 135 MMOL/L

## 2023-03-20 NOTE — TELEPHONE ENCOUNTER
----- Message from PATRICK Cheung CNP sent at 3/20/2023  3:57 PM EDT -----  Please call nursing home and get update on his weights and symptoms. Kidney function is worse . Electrolytes are stable.

## 2023-03-22 PROBLEM — R79.89 ELEVATED TROPONIN: Status: RESOLVED | Noted: 2022-04-25 | Resolved: 2023-03-22

## 2023-03-22 PROBLEM — R77.8 ELEVATED TROPONIN: Status: RESOLVED | Noted: 2022-04-25 | Resolved: 2023-03-22

## 2023-03-27 ENCOUNTER — TELEPHONE (OUTPATIENT)
Dept: CARDIOLOGY CLINIC | Age: 68
End: 2023-03-27

## 2023-03-27 LAB
B-TYPE NATRIURETIC PEPTIDE: 310.5 PG/ML
BASOPHILS ABSOLUTE: ABNORMAL
BASOPHILS RELATIVE PERCENT: 0.7 %
BUN BLDV-MCNC: 137 MG/DL
CALCIUM SERPL-MCNC: 8.8 MG/DL
CHLORIDE BLD-SCNC: 93 MMOL/L
CO2: 26 MMOL/L
CREAT SERPL-MCNC: 2.2 MG/DL
EGFR: 30
EOSINOPHILS ABSOLUTE: 0.1 /ΜL
EOSINOPHILS RELATIVE PERCENT: 1.3 %
GLUCOSE BLD-MCNC: 345 MG/DL
HCT VFR BLD CALC: 33.6 % (ref 41–53)
HEMOGLOBIN: 11.1 G/DL (ref 13.5–17.5)
LYMPHOCYTES ABSOLUTE: 0.5 /ΜL
LYMPHOCYTES RELATIVE PERCENT: 9 %
MCH RBC QN AUTO: 28.4 PG
MCHC RBC AUTO-ENTMCNC: 32.9 G/DL
MCV RBC AUTO: 86.3 FL
MONOCYTES ABSOLUTE: 0.5 /ΜL
MONOCYTES RELATIVE PERCENT: 8 %
NEUTROPHILS ABSOLUTE: 5 /ΜL
NEUTROPHILS RELATIVE PERCENT: 81 %
PLATELET # BLD: 122 K/ΜL
PMV BLD AUTO: 9.7 FL
POTASSIUM SERPL-SCNC: 4 MMOL/L
RBC # BLD: 3.9 10^6/ΜL
SODIUM BLD-SCNC: 131 MMOL/L
WBC # BLD: 6.1 10^3/ML

## 2023-03-27 NOTE — TELEPHONE ENCOUNTER
Called and spoke with Jazmin Ray RN at [facility], she reports STAT labs were ordered for patient this AM, no results back yet.   ~ Weight log  03/25/23 239.4  03/26/23 240.0   03/27/23 237.0    States patient has R> L edema to bilateral lower extremities, per patient improved to prior. Has a cough. Denies SOB. Patient has been taking torsemide 50 mg once daily per nursing staff. Metolazone 5 mg as needed was last administered on 03/25/23.

## 2023-03-27 NOTE — TELEPHONE ENCOUNTER
----- Message from PATRICK Ledesma CNP sent at 3/27/2023 12:55 PM EDT -----  Please call nursing home. Kidney function is worse. Electrolytes are stable. Need an update on his weights, status, and meds he is taking if they haven't sent him to the hospital yet.

## 2023-03-28 NOTE — TELEPHONE ENCOUNTER
Pt nursing home called for clarification on metolazone. Per NP RB message, instructed nurse to further hold metolazone, that no d/c order was mentioned/placed.

## 2023-03-28 NOTE — TELEPHONE ENCOUNTER
Called and spoke with [facility] RN, notified of Leatha Damon NP message. She VU.  Given office fax to send today at [phone] attention RB NP she boyd

## 2023-03-28 NOTE — TELEPHONE ENCOUNTER
Please have nursing home hold further metolazone. Please have them send us yesterday's labs.    Thanks

## 2023-03-30 LAB
ALBUMIN SERPL-MCNC: NORMAL G/DL
ALP BLD-CCNC: NORMAL U/L
ALT SERPL-CCNC: NORMAL U/L
ANION GAP SERPL CALCULATED.3IONS-SCNC: NORMAL MMOL/L
AST SERPL-CCNC: NORMAL U/L
B-TYPE NATRIURETIC PEPTIDE: 134.8 PG/ML
BASOPHILS ABSOLUTE: ABNORMAL
BASOPHILS RELATIVE PERCENT: 0.7 %
BILIRUB SERPL-MCNC: NORMAL MG/DL
BUN BLDV-MCNC: 85 MG/DL
BUN BLDV-MCNC: 85 MG/DL
CALCIUM SERPL-MCNC: 9.3 MG/DL
CALCIUM SERPL-MCNC: 9.3 MG/DL
CHLORIDE BLD-SCNC: 99 MMOL/L
CHLORIDE BLD-SCNC: 99 MMOL/L
CO2: 28 MMOL/L
CO2: NORMAL
CREAT SERPL-MCNC: 1.4 MG/DL
CREAT SERPL-MCNC: 1.4 MG/DL
EGFR: NORMAL
EGFR: NORMAL
EOSINOPHILS ABSOLUTE: 0.2 /ΜL
EOSINOPHILS RELATIVE PERCENT: 3.8 %
GLUCOSE BLD-MCNC: 210 MG/DL
GLUCOSE BLD-MCNC: NORMAL MG/DL
HCT VFR BLD CALC: 35 % (ref 41–53)
HEMOGLOBIN: 11.7 G/DL (ref 13.5–17.5)
LYMPHOCYTES ABSOLUTE: 1 /ΜL
LYMPHOCYTES RELATIVE PERCENT: 15.1 %
MCH RBC QN AUTO: 28.1 PG
MCHC RBC AUTO-ENTMCNC: 33.4 G/DL
MCV RBC AUTO: 84.2 FL
MONOCYTES ABSOLUTE: 0.4 /ΜL
MONOCYTES RELATIVE PERCENT: 5.8 %
NEUTROPHILS ABSOLUTE: 4.7 /ΜL
NEUTROPHILS RELATIVE PERCENT: 74.6 %
PLATELET # BLD: 152 K/ΜL
PMV BLD AUTO: ABNORMAL FL
POTASSIUM SERPL-SCNC: 4.3 MMOL/L
POTASSIUM SERPL-SCNC: 4.3 MMOL/L
RBC # BLD: 4.16 10^6/ΜL
SODIUM BLD-SCNC: 136 MMOL/L
SODIUM BLD-SCNC: 136 MMOL/L
TOTAL PROTEIN: NORMAL
WBC # BLD: 6.3 10^3/ML

## 2023-03-31 ENCOUNTER — TELEPHONE (OUTPATIENT)
Dept: CARDIOLOGY CLINIC | Age: 68
End: 2023-03-31

## 2023-04-06 LAB
ALBUMIN SERPL-MCNC: NORMAL G/DL
ALP BLD-CCNC: NORMAL U/L
ALT SERPL-CCNC: NORMAL U/L
ANION GAP SERPL CALCULATED.3IONS-SCNC: NORMAL MMOL/L
AST SERPL-CCNC: NORMAL U/L
BASOPHILS ABSOLUTE: ABNORMAL
BASOPHILS RELATIVE PERCENT: 0.3 %
BILIRUB SERPL-MCNC: NORMAL MG/DL
BUN BLDV-MCNC: 76 MG/DL
CALCIUM SERPL-MCNC: 8.9 MG/DL
CHLORIDE BLD-SCNC: 102 MMOL/L
CO2: 23 MMOL/L
CREAT SERPL-MCNC: 1.3 MG/DL
EGFR: NORMAL
EOSINOPHILS ABSOLUTE: 0.2 /ΜL
EOSINOPHILS RELATIVE PERCENT: 8.4 %
GLUCOSE BLD-MCNC: 274 MG/DL
HCT VFR BLD CALC: 35.8 % (ref 41–53)
HEMOGLOBIN: 11.8 G/DL (ref 13.5–17.5)
LYMPHOCYTES ABSOLUTE: 1.1 /ΜL
LYMPHOCYTES RELATIVE PERCENT: 20.1 %
MCH RBC QN AUTO: 28.3 PG
MCHC RBC AUTO-ENTMCNC: 33 G/DL
MCV RBC AUTO: 85.9 FL
MONOCYTES ABSOLUTE: 0.4 /ΜL
MONOCYTES RELATIVE PERCENT: 7.6 %
NEUTROPHILS ABSOLUTE: 3.9 /ΜL
NEUTROPHILS RELATIVE PERCENT: 68.6 %
PLATELET # BLD: 162 K/ΜL
PMV BLD AUTO: 9.4 FL
POTASSIUM SERPL-SCNC: 4.5 MMOL/L
RBC # BLD: 4.17 10^6/ΜL
SODIUM BLD-SCNC: 136 MMOL/L
TOTAL PROTEIN: NORMAL
WBC # BLD: 5.7 10^3/ML

## 2023-04-13 LAB
ALBUMIN SERPL-MCNC: NORMAL G/DL
ALP BLD-CCNC: NORMAL U/L
ALT SERPL-CCNC: NORMAL U/L
ANION GAP SERPL CALCULATED.3IONS-SCNC: NORMAL MMOL/L
AST SERPL-CCNC: NORMAL U/L
BASOPHILS ABSOLUTE: ABNORMAL
BASOPHILS RELATIVE PERCENT: 0.7 %
BILIRUB SERPL-MCNC: NORMAL MG/DL
BUN BLDV-MCNC: 62 MG/DL
CALCIUM SERPL-MCNC: 9.6 MG/DL
CHLORIDE BLD-SCNC: 101 MMOL/L
CO2: 34 MMOL/L
CREAT SERPL-MCNC: 1.3 MG/DL
EGFR: NORMAL
EOSINOPHILS ABSOLUTE: 0.2 /ΜL
EOSINOPHILS RELATIVE PERCENT: 4.2 %
GLUCOSE BLD-MCNC: 184 MG/DL
HCT VFR BLD CALC: 39 % (ref 41–53)
HEMOGLOBIN: 12.5 G/DL (ref 13.5–17.5)
LYMPHOCYTES ABSOLUTE: 1 /ΜL
LYMPHOCYTES RELATIVE PERCENT: 21.4 %
MCH RBC QN AUTO: 28 PG
MCHC RBC AUTO-ENTMCNC: 32.1 G/DL
MCV RBC AUTO: 87.3 FL
MONOCYTES ABSOLUTE: 0.3 /ΜL
MONOCYTES RELATIVE PERCENT: 7.4 %
NEUTROPHILS ABSOLUTE: 3.1 /ΜL
NEUTROPHILS RELATIVE PERCENT: 66.3 %
PLATELET # BLD: 124 K/ΜL
PMV BLD AUTO: 8.8 FL
POTASSIUM SERPL-SCNC: 4 MMOL/L
RBC # BLD: 4.47 10^6/ΜL
SODIUM BLD-SCNC: 137 MMOL/L
TOTAL PROTEIN: NORMAL
WBC # BLD: 4.7 10^3/ML

## 2023-05-02 ENCOUNTER — HOSPITAL ENCOUNTER (OUTPATIENT)
Age: 68
Discharge: HOME | End: 2023-05-02
Payer: MEDICARE

## 2023-05-02 ENCOUNTER — HOSPITAL ENCOUNTER (OUTPATIENT)
Dept: ULTRASOUND IMAGING | Age: 68
Discharge: HOME | End: 2023-05-02
Payer: MEDICARE

## 2023-05-02 DIAGNOSIS — R18.0 ASCITES, MALIGNANT: ICD-10-CM

## 2023-05-02 LAB
DEPRECATED RDW RBC AUTO: 18.6 % (ref 12.4–15.4)
HCT VFR BLD AUTO: 37.6 % (ref 40.5–52.5)
HGB BLD-MCNC: 12.3 G/DL (ref 13.5–17.5)
INR PPP: 1.19 (ref 0.84–1.16)
MCH RBC QN AUTO: 28.3 PG (ref 26–34)
MCHC RBC AUTO-ENTMCNC: 32.7 G/DL (ref 31–36)
MCV RBC AUTO: 86.8 FL (ref 80–100)
PLATELET # BLD AUTO: 129 K/UL (ref 135–450)
PMV BLD AUTO: 8.2 FL (ref 5–10.5)
PROTHROMBIN TIME: 15.1 SEC (ref 11.5–14.8)
RBC # BLD AUTO: 4.33 M/UL (ref 4.2–5.9)
WBC # BLD AUTO: 6.4 K/UL (ref 4–11)

## 2023-05-02 PROCEDURE — 76705 ECHO EXAM OF ABDOMEN: CPT

## 2023-05-02 PROCEDURE — 85610 PROTHROMBIN TIME: CPT

## 2023-05-02 PROCEDURE — 36415 COLL VENOUS BLD VENIPUNCTURE: CPT

## 2023-05-02 PROCEDURE — 85027 COMPLETE CBC AUTOMATED: CPT

## 2023-05-03 LAB
BASOPHILS ABSOLUTE: ABNORMAL
BASOPHILS RELATIVE PERCENT: 0.8 %
BUN BLDV-MCNC: 66 MG/DL
CALCIUM SERPL-MCNC: 9.1 MG/DL
CHLORIDE BLD-SCNC: 104 MMOL/L
CO2: 19 MMOL/L
CREAT SERPL-MCNC: 1.3 MG/DL
EGFR: 55
EOSINOPHILS ABSOLUTE: 0.2 /ΜL
EOSINOPHILS RELATIVE PERCENT: 4.2 %
GLUCOSE BLD-MCNC: 300 MG/DL
HCT VFR BLD CALC: 35.7 % (ref 41–53)
HEMOGLOBIN: 11.5 G/DL (ref 13.5–17.5)
LYMPHOCYTES ABSOLUTE: 0.8 /ΜL
LYMPHOCYTES RELATIVE PERCENT: 16.6 %
MCH RBC QN AUTO: 28 PG
MCHC RBC AUTO-ENTMCNC: 32.2 G/DL
MCV RBC AUTO: 87.1 FL
MONOCYTES ABSOLUTE: 0.4 /ΜL
MONOCYTES RELATIVE PERCENT: 8.4 %
NEUTROPHILS ABSOLUTE: 3.4 /ΜL
NEUTROPHILS RELATIVE PERCENT: 70 %
PLATELET # BLD: 11.3 K/ΜL
PMV BLD AUTO: 9.7 FL
POTASSIUM SERPL-SCNC: 4.1 MMOL/L
RBC # BLD: 4.1 10^6/ΜL
SODIUM BLD-SCNC: 136 MMOL/L
WBC # BLD: 4.9 10^3/ML

## 2023-05-09 ENCOUNTER — TELEPHONE (OUTPATIENT)
Dept: CARDIOLOGY CLINIC | Age: 68
End: 2023-05-09

## 2023-05-09 NOTE — TELEPHONE ENCOUNTER
[facility] stated that pts weight has steadily increased. Pts weight on 05/01 was 247.4 and on 5/9 it was 253.4. In April 2023 his weight was in the 240s. At the time of the call they did not have any pts symptoms.

## 2023-05-10 ENCOUNTER — TELEPHONE (OUTPATIENT)
Dept: CARDIOLOGY CLINIC | Age: 68
End: 2023-05-10

## 2023-05-10 NOTE — TELEPHONE ENCOUNTER
[id number] Amarilis Mejia for updated med list. I was transferred to pt.'s nurse. The line rang for 5 minutes with no answer.  Office staff will try to call back

## 2023-05-10 NOTE — TELEPHONE ENCOUNTER
Med list from nursing home. Shows that he is taking torsemide 50 mg a day  There is no metolazone ordered  And he is on spironolactone 25 mg daily    Most recent lab work from 5/4/2023 showed creatinine 1.3, BUN 68, potassium 4.3  His H&H is stable at 11.3 and 34  BNP is 149      Plan:  reCommend we increase torsemide back to 100 mg daily due to weight gain on 5/9/23 up to 253 pounds from 247 pounds in 1 week.     Orders faxed to nursing home by me

## 2023-05-11 ENCOUNTER — OFFICE VISIT (OUTPATIENT)
Dept: ENT CLINIC | Age: 68
End: 2023-05-11
Payer: MEDICARE

## 2023-05-11 DIAGNOSIS — R04.0 EPISTAXIS: Primary | ICD-10-CM

## 2023-05-11 PROCEDURE — G8427 DOCREV CUR MEDS BY ELIG CLIN: HCPCS | Performed by: OTOLARYNGOLOGY

## 2023-05-11 PROCEDURE — 1036F TOBACCO NON-USER: CPT | Performed by: OTOLARYNGOLOGY

## 2023-05-11 PROCEDURE — 3017F COLORECTAL CA SCREEN DOC REV: CPT | Performed by: OTOLARYNGOLOGY

## 2023-05-11 PROCEDURE — 1123F ACP DISCUSS/DSCN MKR DOCD: CPT | Performed by: OTOLARYNGOLOGY

## 2023-05-11 PROCEDURE — 31238 NSL/SINS NDSC SRG NSL HEMRRG: CPT | Performed by: OTOLARYNGOLOGY

## 2023-05-11 PROCEDURE — G8417 CALC BMI ABV UP PARAM F/U: HCPCS | Performed by: OTOLARYNGOLOGY

## 2023-05-11 PROCEDURE — 99212 OFFICE O/P EST SF 10 MIN: CPT | Performed by: OTOLARYNGOLOGY

## 2023-05-11 RX ORDER — PETROLATUM 42 G/100G
OINTMENT TOPICAL
Qty: 100 G | Refills: 1 | Status: SHIPPED | OUTPATIENT
Start: 2023-05-11

## 2023-05-11 NOTE — PROGRESS NOTES
was removed with diffuse oozing, cauterized with AgNO3 and Surgicel placed over the site. Tolerated well without complication. I attest that I was present for and did the entire procedure myself. Assessment and Plan     1. Epistaxis  Treated with Surgicel dressing and AgNO3 cauterization of the left inferior turbinate head, has left septal deviation caudal septal spur but no epistaxis over the site. Have asked him to restart nasal saline sprays, emollients, he is to call back with any worsening. Sobeida Carbajal MD  [facility]  Department of Otolaryngology/Head and Neck Surgery  5/11/23    I have performed a head and neck physical exam personally or was physically present during the key or critical portions of the service. Medical Decision Making: The following items were considered in medical decision making:  Independent review of images  Review / order clinical lab tests  Review / order radiology tests  Decision to obtain old records  Review and summation of old records as accessed through [facility] (a summary of my findings in these old records: None)     Portions of this note were dictated using Dragon.  There may be linguistic errors secondary to the use of this program.

## 2023-05-17 ENCOUNTER — TELEPHONE (OUTPATIENT)
Dept: CARDIOLOGY CLINIC | Age: 68
End: 2023-05-17

## 2023-05-17 NOTE — TELEPHONE ENCOUNTER
Spoke with SIGRID Romero at [facility] WILLEM VARELA. Per NPDD give one time order for metolazone 5 mg.  She V/U of [employer] previous message

## 2023-05-17 NOTE — TELEPHONE ENCOUNTER
Lets give him 1 dose of metolazone 5 mg first thing in a.m. prior to his a.m. dose of torsemide.   Thank you, Sharon Kwok

## 2023-05-17 NOTE — TELEPHONE ENCOUNTER
[facility] Rom[facility] and stated since increasing torsemide pts weight has still went up 13 lbs in 8 days and is SOB & swollen.  Please advise

## 2023-05-18 LAB
B-TYPE NATRIURETIC PEPTIDE: 258.1 PG/ML
BUN BLDV-MCNC: 93 MG/DL
CALCIUM SERPL-MCNC: 9.2 MG/DL
CHLORIDE BLD-SCNC: 99 MMOL/L
CO2: 25 MMOL/L
CREAT SERPL-MCNC: 1.7 MG/DL
EGFR: NORMAL
GLUCOSE BLD-MCNC: 397 MG/DL
POTASSIUM SERPL-SCNC: 4.7 MMOL/L
SODIUM BLD-SCNC: 133 MMOL/L

## 2023-05-19 ENCOUNTER — APPOINTMENT (OUTPATIENT)
Dept: GENERAL RADIOLOGY | Age: 68
DRG: 291 | End: 2023-05-19
Payer: MEDICARE

## 2023-05-19 ENCOUNTER — HOSPITAL ENCOUNTER (INPATIENT)
Age: 68
LOS: 5 days | Discharge: NURSING, REHAB OR LONG-TERM CARE | DRG: 291 | End: 2023-05-24
Attending: STUDENT IN AN ORGANIZED HEALTH CARE EDUCATION/TRAINING PROGRAM | Admitting: HOSPITALIST
Payer: MEDICARE

## 2023-05-19 DIAGNOSIS — N18.9 ACUTE KIDNEY INJURY SUPERIMPOSED ON CKD (HCC): ICD-10-CM

## 2023-05-19 DIAGNOSIS — I50.9 ACUTE ON CHRONIC CONGESTIVE HEART FAILURE, UNSPECIFIED HEART FAILURE TYPE (HCC): Primary | ICD-10-CM

## 2023-05-19 DIAGNOSIS — J96.21 ACUTE ON CHRONIC RESPIRATORY FAILURE WITH HYPOXEMIA (HCC): ICD-10-CM

## 2023-05-19 DIAGNOSIS — G89.29 CHRONIC NECK PAIN: ICD-10-CM

## 2023-05-19 DIAGNOSIS — M54.2 CHRONIC NECK PAIN: ICD-10-CM

## 2023-05-19 DIAGNOSIS — N17.9 ACUTE KIDNEY INJURY SUPERIMPOSED ON CKD (HCC): ICD-10-CM

## 2023-05-19 PROBLEM — I50.21 ACUTE CLINICAL SYSTOLIC HEART FAILURE (HCC): Status: RESOLVED | Noted: 2023-05-19 | Resolved: 2023-05-19

## 2023-05-19 PROBLEM — I50.33 ACUTE ON CHRONIC DIASTOLIC (CONGESTIVE) HEART FAILURE (HCC): Status: ACTIVE | Noted: 2023-05-19

## 2023-05-19 PROBLEM — I50.21 ACUTE CLINICAL SYSTOLIC HEART FAILURE (HCC): Status: ACTIVE | Noted: 2023-05-19

## 2023-05-19 LAB
ALBUMIN SERPL-MCNC: 3.7 G/DL (ref 3.4–5)
ALBUMIN/GLOB SERPL: 0.9 {RATIO} (ref 1.1–2.2)
ALP SERPL-CCNC: 159 U/L (ref 40–129)
ALT SERPL-CCNC: 11 U/L (ref 10–40)
ANION GAP SERPL CALCULATED.3IONS-SCNC: 12 MMOL/L (ref 3–16)
AST SERPL-CCNC: 14 U/L (ref 15–37)
BASOPHILS # BLD: 0.1 K/UL (ref 0–0.2)
BASOPHILS NFR BLD: 1.1 %
BILIRUB SERPL-MCNC: 0.6 MG/DL (ref 0–1)
BILIRUB UR QL STRIP.AUTO: NEGATIVE
BUN SERPL-MCNC: 100 MG/DL (ref 7–20)
CALCIUM SERPL-MCNC: 9.3 MG/DL (ref 8.3–10.6)
CHLORIDE SERPL-SCNC: 92 MMOL/L (ref 99–110)
CLARITY UR: CLEAR
CO2 SERPL-SCNC: 27 MMOL/L (ref 21–32)
COLOR UR: NORMAL
CREAT SERPL-MCNC: 1.7 MG/DL (ref 0.8–1.3)
DEPRECATED RDW RBC AUTO: 18.2 % (ref 12.4–15.4)
EOSINOPHIL # BLD: 0.2 K/UL (ref 0–0.6)
EOSINOPHIL NFR BLD: 2.4 %
GFR SERPLBLD CREATININE-BSD FMLA CKD-EPI: 43 ML/MIN/{1.73_M2}
GLUCOSE BLD-MCNC: 302 MG/DL (ref 70–99)
GLUCOSE SERPL-MCNC: 393 MG/DL (ref 70–99)
GLUCOSE UR STRIP.AUTO-MCNC: NEGATIVE MG/DL
HCT VFR BLD AUTO: 37.4 % (ref 40.5–52.5)
HGB BLD-MCNC: 11.9 G/DL (ref 13.5–17.5)
HGB UR QL STRIP.AUTO: NEGATIVE
KETONES UR STRIP.AUTO-MCNC: NEGATIVE MG/DL
LACTATE BLDV-SCNC: 0.9 MMOL/L (ref 0.4–2)
LEUKOCYTE ESTERASE UR QL STRIP.AUTO: NEGATIVE
LYMPHOCYTES # BLD: 0.5 K/UL (ref 1–5.1)
LYMPHOCYTES NFR BLD: 6.5 %
MCH RBC QN AUTO: 27 PG (ref 26–34)
MCHC RBC AUTO-ENTMCNC: 31.9 G/DL (ref 31–36)
MCV RBC AUTO: 84.8 FL (ref 80–100)
MONOCYTES # BLD: 0.5 K/UL (ref 0–1.3)
MONOCYTES NFR BLD: 6.1 %
NEUTROPHILS # BLD: 7 K/UL (ref 1.7–7.7)
NEUTROPHILS NFR BLD: 83.9 %
NITRITE UR QL STRIP.AUTO: NEGATIVE
NT-PROBNP SERPL-MCNC: 5430 PG/ML (ref 0–124)
PERFORMED ON: ABNORMAL
PH UR STRIP.AUTO: 5.5 [PH] (ref 5–8)
PLATELET # BLD AUTO: 195 K/UL (ref 135–450)
PMV BLD AUTO: 9.3 FL (ref 5–10.5)
POTASSIUM SERPL-SCNC: 4.4 MMOL/L (ref 3.5–5.1)
PROCALCITONIN SERPL IA-MCNC: 0.24 NG/ML (ref 0–0.15)
PROT SERPL-MCNC: 7.6 G/DL (ref 6.4–8.2)
PROT UR STRIP.AUTO-MCNC: NEGATIVE MG/DL
RBC # BLD AUTO: 4.41 M/UL (ref 4.2–5.9)
REASON FOR REJECTION: NORMAL
REJECTED TEST: NORMAL
SODIUM SERPL-SCNC: 131 MMOL/L (ref 136–145)
SP GR UR STRIP.AUTO: 1.01 (ref 1–1.03)
TROPONIN, HIGH SENSITIVITY: 61 NG/L (ref 0–22)
TROPONIN, HIGH SENSITIVITY: 64 NG/L (ref 0–22)
UA COMPLETE W REFLEX CULTURE PNL UR: NORMAL
UA DIPSTICK W REFLEX MICRO PNL UR: NORMAL
URN SPEC COLLECT METH UR: NORMAL
UROBILINOGEN UR STRIP-ACNC: 0.2 E.U./DL
WBC # BLD AUTO: 8.3 K/UL (ref 4–11)

## 2023-05-19 PROCEDURE — 93005 ELECTROCARDIOGRAM TRACING: CPT | Performed by: PHYSICIAN ASSISTANT

## 2023-05-19 PROCEDURE — 2700000000 HC OXYGEN THERAPY PER DAY

## 2023-05-19 PROCEDURE — 85025 COMPLETE CBC W/AUTO DIFF WBC: CPT

## 2023-05-19 PROCEDURE — 83605 ASSAY OF LACTIC ACID: CPT

## 2023-05-19 PROCEDURE — 99285 EMERGENCY DEPT VISIT HI MDM: CPT

## 2023-05-19 PROCEDURE — 6370000000 HC RX 637 (ALT 250 FOR IP): Performed by: NURSE PRACTITIONER

## 2023-05-19 PROCEDURE — 71045 X-RAY EXAM CHEST 1 VIEW: CPT

## 2023-05-19 PROCEDURE — 2060000000 HC ICU INTERMEDIATE R&B

## 2023-05-19 PROCEDURE — 83880 ASSAY OF NATRIURETIC PEPTIDE: CPT

## 2023-05-19 PROCEDURE — 80053 COMPREHEN METABOLIC PANEL: CPT

## 2023-05-19 PROCEDURE — 6360000002 HC RX W HCPCS: Performed by: PHYSICIAN ASSISTANT

## 2023-05-19 PROCEDURE — 96374 THER/PROPH/DIAG INJ IV PUSH: CPT

## 2023-05-19 PROCEDURE — 2580000003 HC RX 258: Performed by: NURSE PRACTITIONER

## 2023-05-19 PROCEDURE — 84484 ASSAY OF TROPONIN QUANT: CPT

## 2023-05-19 PROCEDURE — 81003 URINALYSIS AUTO W/O SCOPE: CPT

## 2023-05-19 PROCEDURE — 36415 COLL VENOUS BLD VENIPUNCTURE: CPT

## 2023-05-19 PROCEDURE — 84145 PROCALCITONIN (PCT): CPT

## 2023-05-19 RX ORDER — ATORVASTATIN CALCIUM 80 MG/1
80 TABLET, FILM COATED ORAL NIGHTLY
Status: DISCONTINUED | OUTPATIENT
Start: 2023-05-19 | End: 2023-05-24 | Stop reason: HOSPADM

## 2023-05-19 RX ORDER — SPIRONOLACTONE 25 MG/1
25 TABLET ORAL DAILY
Status: DISCONTINUED | OUTPATIENT
Start: 2023-05-20 | End: 2023-05-24 | Stop reason: HOSPADM

## 2023-05-19 RX ORDER — ENOXAPARIN SODIUM 100 MG/ML
40 INJECTION SUBCUTANEOUS DAILY
Status: DISCONTINUED | OUTPATIENT
Start: 2023-05-20 | End: 2023-05-23

## 2023-05-19 RX ORDER — DORZOLAMIDE HCL 20 MG/ML
1 SOLUTION/ DROPS OPHTHALMIC 2 TIMES DAILY
Status: DISCONTINUED | OUTPATIENT
Start: 2023-05-19 | End: 2023-05-24 | Stop reason: HOSPADM

## 2023-05-19 RX ORDER — SODIUM CHLORIDE 0.9 % (FLUSH) 0.9 %
5-40 SYRINGE (ML) INJECTION PRN
Status: DISCONTINUED | OUTPATIENT
Start: 2023-05-19 | End: 2023-05-24 | Stop reason: HOSPADM

## 2023-05-19 RX ORDER — POLYETHYLENE GLYCOL 3350 17 G/17G
17 POWDER, FOR SOLUTION ORAL DAILY PRN
Status: DISCONTINUED | OUTPATIENT
Start: 2023-05-19 | End: 2023-05-24 | Stop reason: HOSPADM

## 2023-05-19 RX ORDER — SODIUM CHLORIDE 9 MG/ML
INJECTION, SOLUTION INTRAVENOUS PRN
Status: DISCONTINUED | OUTPATIENT
Start: 2023-05-19 | End: 2023-05-24 | Stop reason: HOSPADM

## 2023-05-19 RX ORDER — TIMOLOL MALEATE 5 MG/ML
1 SOLUTION/ DROPS OPHTHALMIC 2 TIMES DAILY
Status: DISCONTINUED | OUTPATIENT
Start: 2023-05-19 | End: 2023-05-20

## 2023-05-19 RX ORDER — INSULIN LISPRO 100 [IU]/ML
0-4 INJECTION, SOLUTION INTRAVENOUS; SUBCUTANEOUS NIGHTLY
Status: DISCONTINUED | OUTPATIENT
Start: 2023-05-19 | End: 2023-05-22

## 2023-05-19 RX ORDER — GUAIFENESIN 600 MG/1
600 TABLET, EXTENDED RELEASE ORAL 2 TIMES DAILY
Status: DISCONTINUED | OUTPATIENT
Start: 2023-05-19 | End: 2023-05-24 | Stop reason: HOSPADM

## 2023-05-19 RX ORDER — PANTOPRAZOLE SODIUM 40 MG/1
40 TABLET, DELAYED RELEASE ORAL DAILY
Status: DISCONTINUED | OUTPATIENT
Start: 2023-05-20 | End: 2023-05-24 | Stop reason: HOSPADM

## 2023-05-19 RX ORDER — BRIMONIDINE TARTRATE 2 MG/ML
1 SOLUTION/ DROPS OPHTHALMIC 2 TIMES DAILY
Status: DISCONTINUED | OUTPATIENT
Start: 2023-05-19 | End: 2023-05-20

## 2023-05-19 RX ORDER — FERROUS SULFATE 325(65) MG
325 TABLET ORAL 2 TIMES DAILY
Status: DISCONTINUED | OUTPATIENT
Start: 2023-05-19 | End: 2023-05-24 | Stop reason: HOSPADM

## 2023-05-19 RX ORDER — FUROSEMIDE 10 MG/ML
40 INJECTION INTRAMUSCULAR; INTRAVENOUS 2 TIMES DAILY
Status: DISCONTINUED | OUTPATIENT
Start: 2023-05-20 | End: 2023-05-23

## 2023-05-19 RX ORDER — ACETAMINOPHEN 325 MG/1
650 TABLET ORAL EVERY 6 HOURS PRN
Status: DISCONTINUED | OUTPATIENT
Start: 2023-05-19 | End: 2023-05-24 | Stop reason: HOSPADM

## 2023-05-19 RX ORDER — BRIMONIDINE TARTRATE AND TIMOLOL MALEATE 2; 5 MG/ML; MG/ML
1 SOLUTION OPHTHALMIC 2 TIMES DAILY
Status: DISCONTINUED | OUTPATIENT
Start: 2023-05-19 | End: 2023-05-19 | Stop reason: CLARIF

## 2023-05-19 RX ORDER — PREGABALIN 25 MG/1
50 CAPSULE ORAL EVERY 12 HOURS
Status: DISCONTINUED | OUTPATIENT
Start: 2023-05-19 | End: 2023-05-24 | Stop reason: HOSPADM

## 2023-05-19 RX ORDER — CARVEDILOL 3.12 MG/1
3.12 TABLET ORAL 2 TIMES DAILY WITH MEALS
Status: DISCONTINUED | OUTPATIENT
Start: 2023-05-20 | End: 2023-05-24 | Stop reason: HOSPADM

## 2023-05-19 RX ORDER — INSULIN GLARGINE 100 [IU]/ML
40 INJECTION, SOLUTION SUBCUTANEOUS NIGHTLY
Status: DISCONTINUED | OUTPATIENT
Start: 2023-05-19 | End: 2023-05-24 | Stop reason: HOSPADM

## 2023-05-19 RX ORDER — ACETAMINOPHEN 650 MG/1
650 SUPPOSITORY RECTAL EVERY 6 HOURS PRN
Status: DISCONTINUED | OUTPATIENT
Start: 2023-05-19 | End: 2023-05-24 | Stop reason: HOSPADM

## 2023-05-19 RX ORDER — DOCUSATE SODIUM 100 MG/1
100 CAPSULE, LIQUID FILLED ORAL 2 TIMES DAILY
Status: DISCONTINUED | OUTPATIENT
Start: 2023-05-19 | End: 2023-05-24 | Stop reason: HOSPADM

## 2023-05-19 RX ORDER — DEXTROSE MONOHYDRATE 100 MG/ML
INJECTION, SOLUTION INTRAVENOUS CONTINUOUS PRN
Status: DISCONTINUED | OUTPATIENT
Start: 2023-05-19 | End: 2023-05-24 | Stop reason: HOSPADM

## 2023-05-19 RX ORDER — CARBOXYMETHYLCELLULOSE SODIUM 10 MG/ML
1 GEL OPHTHALMIC 4 TIMES DAILY PRN
Status: DISCONTINUED | OUTPATIENT
Start: 2023-05-19 | End: 2023-05-24 | Stop reason: HOSPADM

## 2023-05-19 RX ORDER — SENNA PLUS 8.6 MG/1
1 TABLET ORAL 2 TIMES DAILY
Status: DISCONTINUED | OUTPATIENT
Start: 2023-05-19 | End: 2023-05-24 | Stop reason: HOSPADM

## 2023-05-19 RX ORDER — ONDANSETRON 2 MG/ML
4 INJECTION INTRAMUSCULAR; INTRAVENOUS EVERY 6 HOURS PRN
Status: DISCONTINUED | OUTPATIENT
Start: 2023-05-19 | End: 2023-05-24 | Stop reason: HOSPADM

## 2023-05-19 RX ORDER — FUROSEMIDE 10 MG/ML
80 INJECTION INTRAMUSCULAR; INTRAVENOUS ONCE
Status: COMPLETED | OUTPATIENT
Start: 2023-05-19 | End: 2023-05-19

## 2023-05-19 RX ORDER — ASCORBIC ACID 500 MG
500 TABLET ORAL DAILY
Status: DISCONTINUED | OUTPATIENT
Start: 2023-05-20 | End: 2023-05-24 | Stop reason: HOSPADM

## 2023-05-19 RX ORDER — ALLOPURINOL 300 MG/1
300 TABLET ORAL DAILY
Status: DISCONTINUED | OUTPATIENT
Start: 2023-05-20 | End: 2023-05-24 | Stop reason: HOSPADM

## 2023-05-19 RX ORDER — TAMSULOSIN HYDROCHLORIDE 0.4 MG/1
0.8 CAPSULE ORAL DAILY
Status: DISCONTINUED | OUTPATIENT
Start: 2023-05-20 | End: 2023-05-24 | Stop reason: HOSPADM

## 2023-05-19 RX ORDER — SODIUM CHLORIDE 0.9 % (FLUSH) 0.9 %
5-40 SYRINGE (ML) INJECTION EVERY 12 HOURS SCHEDULED
Status: DISCONTINUED | OUTPATIENT
Start: 2023-05-19 | End: 2023-05-24 | Stop reason: HOSPADM

## 2023-05-19 RX ORDER — ONDANSETRON 4 MG/1
4 TABLET, ORALLY DISINTEGRATING ORAL EVERY 8 HOURS PRN
Status: DISCONTINUED | OUTPATIENT
Start: 2023-05-19 | End: 2023-05-24 | Stop reason: HOSPADM

## 2023-05-19 RX ORDER — INSULIN LISPRO 100 [IU]/ML
0-8 INJECTION, SOLUTION INTRAVENOUS; SUBCUTANEOUS
Status: DISCONTINUED | OUTPATIENT
Start: 2023-05-20 | End: 2023-05-22

## 2023-05-19 RX ADMIN — SENNOSIDES 8.6 MG: 8.6 TABLET, COATED ORAL at 23:09

## 2023-05-19 RX ADMIN — PREGABALIN 50 MG: 25 CAPSULE ORAL at 23:09

## 2023-05-19 RX ADMIN — GUAIFENESIN 600 MG: 600 TABLET, EXTENDED RELEASE ORAL at 23:09

## 2023-05-19 RX ADMIN — DORZOLAMIDE HCL 1 DROP: 20 SOLUTION/ DROPS OPHTHALMIC at 23:35

## 2023-05-19 RX ADMIN — BRIMONIDINE TARTRATE 1 DROP: 2 SOLUTION/ DROPS OPHTHALMIC at 23:35

## 2023-05-19 RX ADMIN — INSULIN GLARGINE 40 UNITS: 100 INJECTION, SOLUTION SUBCUTANEOUS at 23:14

## 2023-05-19 RX ADMIN — ATORVASTATIN CALCIUM 80 MG: 80 TABLET, FILM COATED ORAL at 23:09

## 2023-05-19 RX ADMIN — TIMOLOL MALEATE 1 DROP: 5 SOLUTION/ DROPS OPHTHALMIC at 23:15

## 2023-05-19 RX ADMIN — ACETAMINOPHEN 650 MG: 325 TABLET ORAL at 23:21

## 2023-05-19 RX ADMIN — DOCUSATE SODIUM 100 MG: 100 CAPSULE, LIQUID FILLED ORAL at 23:09

## 2023-05-19 RX ADMIN — FERROUS SULFATE TAB 325 MG (65 MG ELEMENTAL FE) 325 MG: 325 (65 FE) TAB at 23:09

## 2023-05-19 RX ADMIN — SODIUM CHLORIDE, PRESERVATIVE FREE 10 ML: 5 INJECTION INTRAVENOUS at 23:10

## 2023-05-19 RX ADMIN — FUROSEMIDE 80 MG: 10 INJECTION, SOLUTION INTRAMUSCULAR; INTRAVENOUS at 19:32

## 2023-05-19 RX ADMIN — INSULIN LISPRO 4 UNITS: 100 INJECTION, SOLUTION INTRAVENOUS; SUBCUTANEOUS at 23:15

## 2023-05-19 ASSESSMENT — PAIN SCALES - GENERAL
PAINLEVEL_OUTOF10: 0

## 2023-05-19 ASSESSMENT — PAIN - FUNCTIONAL ASSESSMENT: PAIN_FUNCTIONAL_ASSESSMENT: 0-10

## 2023-05-19 ASSESSMENT — PAIN DESCRIPTION - ORIENTATION: ORIENTATION: RIGHT;LEFT;LOWER

## 2023-05-19 ASSESSMENT — PAIN DESCRIPTION - LOCATION: LOCATION: LEG

## 2023-05-19 NOTE — TELEPHONE ENCOUNTER
Please advise. Labs abstracted and placed into chart. RN Fransisco will be scanning them into media ASAP due to me not having scanner.

## 2023-05-19 NOTE — TELEPHONE ENCOUNTER
[facility]ILLEM VARELA called and stated that pts weight as of 5/19 is 262. Pt is still having swelling and mild sob. They did the one time dose of Metolazone 5mg. Labs were sent over on 5/18. [facility] WILLEM VARELA can be reached at [phone]. Please advise.

## 2023-05-19 NOTE — TELEPHONE ENCOUNTER
Reviewed chart and prior labs: given his continued symptoms and increase in his BUN/Cr to 93/1.7, recommend he go to ED for further evaluation of his CHF. He will probably need IV diuretic with close monitoring.     Please call [facility]LEM VAREL[facility]

## 2023-05-20 LAB
ANION GAP SERPL CALCULATED.3IONS-SCNC: 12 MMOL/L (ref 3–16)
BUN SERPL-MCNC: 98 MG/DL (ref 7–20)
CALCIUM SERPL-MCNC: 9.3 MG/DL (ref 8.3–10.6)
CHLORIDE SERPL-SCNC: 94 MMOL/L (ref 99–110)
CHOLEST SERPL-MCNC: 85 MG/DL (ref 0–199)
CO2 SERPL-SCNC: 28 MMOL/L (ref 21–32)
CREAT SERPL-MCNC: 1.6 MG/DL (ref 0.8–1.3)
EKG ATRIAL RATE: 63 BPM
EKG DIAGNOSIS: NORMAL
EKG Q-T INTERVAL: 394 MS
EKG QRS DURATION: 86 MS
EKG QTC CALCULATION (BAZETT): 492 MS
EKG R AXIS: 231 DEGREES
EKG T AXIS: 84 DEGREES
EKG VENTRICULAR RATE: 94 BPM
GFR SERPLBLD CREATININE-BSD FMLA CKD-EPI: 46 ML/MIN/{1.73_M2}
GLUCOSE BLD-MCNC: 198 MG/DL (ref 70–99)
GLUCOSE BLD-MCNC: 260 MG/DL (ref 70–99)
GLUCOSE BLD-MCNC: 311 MG/DL (ref 70–99)
GLUCOSE BLD-MCNC: 357 MG/DL (ref 70–99)
GLUCOSE BLD-MCNC: 383 MG/DL (ref 70–99)
GLUCOSE SERPL-MCNC: 233 MG/DL (ref 70–99)
HDLC SERPL-MCNC: 29 MG/DL (ref 40–60)
LDLC SERPL CALC-MCNC: 44 MG/DL
MAGNESIUM SERPL-MCNC: 2.5 MG/DL (ref 1.8–2.4)
PERFORMED ON: ABNORMAL
POTASSIUM SERPL-SCNC: 3.7 MMOL/L (ref 3.5–5.1)
SODIUM SERPL-SCNC: 134 MMOL/L (ref 136–145)
TRIGL SERPL-MCNC: 62 MG/DL (ref 0–150)
TROPONIN, HIGH SENSITIVITY: 72 NG/L (ref 0–22)
VLDLC SERPL CALC-MCNC: 12 MG/DL

## 2023-05-20 PROCEDURE — 80061 LIPID PANEL: CPT

## 2023-05-20 PROCEDURE — 93010 ELECTROCARDIOGRAM REPORT: CPT | Performed by: INTERNAL MEDICINE

## 2023-05-20 PROCEDURE — 2700000000 HC OXYGEN THERAPY PER DAY

## 2023-05-20 PROCEDURE — 6360000002 HC RX W HCPCS: Performed by: NURSE PRACTITIONER

## 2023-05-20 PROCEDURE — 2060000000 HC ICU INTERMEDIATE R&B

## 2023-05-20 PROCEDURE — 36415 COLL VENOUS BLD VENIPUNCTURE: CPT

## 2023-05-20 PROCEDURE — 2580000003 HC RX 258: Performed by: NURSE PRACTITIONER

## 2023-05-20 PROCEDURE — 97161 PT EVAL LOW COMPLEX 20 MIN: CPT

## 2023-05-20 PROCEDURE — 97110 THERAPEUTIC EXERCISES: CPT

## 2023-05-20 PROCEDURE — 84484 ASSAY OF TROPONIN QUANT: CPT

## 2023-05-20 PROCEDURE — 6370000000 HC RX 637 (ALT 250 FOR IP): Performed by: NURSE PRACTITIONER

## 2023-05-20 PROCEDURE — 83735 ASSAY OF MAGNESIUM: CPT

## 2023-05-20 PROCEDURE — 97530 THERAPEUTIC ACTIVITIES: CPT

## 2023-05-20 PROCEDURE — 99222 1ST HOSP IP/OBS MODERATE 55: CPT | Performed by: INTERNAL MEDICINE

## 2023-05-20 PROCEDURE — 80048 BASIC METABOLIC PNL TOTAL CA: CPT

## 2023-05-20 PROCEDURE — 6370000000 HC RX 637 (ALT 250 FOR IP): Performed by: HOSPITALIST

## 2023-05-20 RX ORDER — BRIMONIDINE TARTRATE AND TIMOLOL MALEATE 2; 5 MG/ML; MG/ML
1 SOLUTION OPHTHALMIC EVERY 12 HOURS
Status: DISCONTINUED | OUTPATIENT
Start: 2023-05-20 | End: 2023-05-24 | Stop reason: HOSPADM

## 2023-05-20 RX ORDER — HYDROCODONE BITARTRATE AND ACETAMINOPHEN 7.5; 325 MG/1; MG/1
1 TABLET ORAL EVERY 6 HOURS PRN
Status: DISCONTINUED | OUTPATIENT
Start: 2023-05-20 | End: 2023-05-21

## 2023-05-20 RX ADMIN — INSULIN LISPRO 4 UNITS: 100 INJECTION, SOLUTION INTRAVENOUS; SUBCUTANEOUS at 12:19

## 2023-05-20 RX ADMIN — GUAIFENESIN 600 MG: 600 TABLET, EXTENDED RELEASE ORAL at 22:48

## 2023-05-20 RX ADMIN — ATORVASTATIN CALCIUM 80 MG: 80 TABLET, FILM COATED ORAL at 22:48

## 2023-05-20 RX ADMIN — SENNOSIDES 8.6 MG: 8.6 TABLET, COATED ORAL at 07:46

## 2023-05-20 RX ADMIN — DOCUSATE SODIUM 100 MG: 100 CAPSULE, LIQUID FILLED ORAL at 22:48

## 2023-05-20 RX ADMIN — PANTOPRAZOLE SODIUM 40 MG: 40 TABLET, DELAYED RELEASE ORAL at 07:46

## 2023-05-20 RX ADMIN — HYDROCODONE BITARTRATE AND ACETAMINOPHEN 1 TABLET: 7.5; 325 TABLET ORAL at 14:46

## 2023-05-20 RX ADMIN — CARVEDILOL 3.12 MG: 3.12 TABLET, FILM COATED ORAL at 17:53

## 2023-05-20 RX ADMIN — DOCUSATE SODIUM 100 MG: 100 CAPSULE, LIQUID FILLED ORAL at 07:46

## 2023-05-20 RX ADMIN — ALLOPURINOL 300 MG: 300 TABLET ORAL at 07:46

## 2023-05-20 RX ADMIN — DORZOLAMIDE HCL 1 DROP: 20 SOLUTION/ DROPS OPHTHALMIC at 22:41

## 2023-05-20 RX ADMIN — SODIUM CHLORIDE, PRESERVATIVE FREE 10 ML: 5 INJECTION INTRAVENOUS at 07:51

## 2023-05-20 RX ADMIN — FUROSEMIDE 40 MG: 10 INJECTION, SOLUTION INTRAMUSCULAR; INTRAVENOUS at 07:46

## 2023-05-20 RX ADMIN — BRIMONIDINE TARTRATE AND TIMOLOL MALEATE 1 DROP: 2; 5 SOLUTION OPHTHALMIC at 22:42

## 2023-05-20 RX ADMIN — INSULIN GLARGINE 40 UNITS: 100 INJECTION, SOLUTION SUBCUTANEOUS at 22:50

## 2023-05-20 RX ADMIN — OXYCODONE HYDROCHLORIDE AND ACETAMINOPHEN 500 MG: 500 TABLET ORAL at 07:46

## 2023-05-20 RX ADMIN — PREGABALIN 50 MG: 25 CAPSULE ORAL at 10:34

## 2023-05-20 RX ADMIN — PREGABALIN 50 MG: 25 CAPSULE ORAL at 22:38

## 2023-05-20 RX ADMIN — CARVEDILOL 3.12 MG: 3.12 TABLET, FILM COATED ORAL at 07:46

## 2023-05-20 RX ADMIN — FUROSEMIDE 40 MG: 10 INJECTION, SOLUTION INTRAMUSCULAR; INTRAVENOUS at 17:53

## 2023-05-20 RX ADMIN — FERROUS SULFATE TAB 325 MG (65 MG ELEMENTAL FE) 325 MG: 325 (65 FE) TAB at 07:46

## 2023-05-20 RX ADMIN — SENNOSIDES 8.6 MG: 8.6 TABLET, COATED ORAL at 22:48

## 2023-05-20 RX ADMIN — BRIMONIDINE TARTRATE AND TIMOLOL MALEATE 1 DROP: 2; 5 SOLUTION OPHTHALMIC at 07:51

## 2023-05-20 RX ADMIN — INSULIN LISPRO 6 UNITS: 100 INJECTION, SOLUTION INTRAVENOUS; SUBCUTANEOUS at 16:38

## 2023-05-20 RX ADMIN — SPIRONOLACTONE 25 MG: 25 TABLET ORAL at 07:46

## 2023-05-20 RX ADMIN — NALOXEGOL OXALATE 25 MG: 25 TABLET, FILM COATED ORAL at 07:00

## 2023-05-20 RX ADMIN — GUAIFENESIN 600 MG: 600 TABLET, EXTENDED RELEASE ORAL at 07:46

## 2023-05-20 RX ADMIN — SODIUM CHLORIDE, PRESERVATIVE FREE 10 ML: 5 INJECTION INTRAVENOUS at 22:39

## 2023-05-20 RX ADMIN — FERROUS SULFATE TAB 325 MG (65 MG ELEMENTAL FE) 325 MG: 325 (65 FE) TAB at 22:48

## 2023-05-20 RX ADMIN — DORZOLAMIDE HCL 1 DROP: 20 SOLUTION/ DROPS OPHTHALMIC at 07:51

## 2023-05-20 RX ADMIN — TAMSULOSIN HYDROCHLORIDE 0.8 MG: 0.4 CAPSULE ORAL at 07:00

## 2023-05-20 RX ADMIN — ENOXAPARIN SODIUM 40 MG: 100 INJECTION SUBCUTANEOUS at 08:42

## 2023-05-20 ASSESSMENT — PAIN SCALES - GENERAL
PAINLEVEL_OUTOF10: 8
PAINLEVEL_OUTOF10: 0

## 2023-05-20 ASSESSMENT — PAIN DESCRIPTION - DESCRIPTORS
DESCRIPTORS: DISCOMFORT;PINS AND NEEDLES
DESCRIPTORS: ACHING;DISCOMFORT
DESCRIPTORS: ACHING;DISCOMFORT

## 2023-05-20 ASSESSMENT — PAIN DESCRIPTION - ORIENTATION
ORIENTATION: RIGHT;LEFT

## 2023-05-20 ASSESSMENT — PAIN DESCRIPTION - PAIN TYPE
TYPE: NEUROPATHIC PAIN
TYPE: NEUROPATHIC PAIN

## 2023-05-20 ASSESSMENT — PAIN DESCRIPTION - LOCATION
LOCATION: BACK;SHOULDER
LOCATION: NECK;BACK;LEG
LOCATION: BACK;NECK;ARM
LOCATION: LEG

## 2023-05-21 LAB
ALBUMIN SERPL-MCNC: 3.6 G/DL (ref 3.4–5)
ANION GAP SERPL CALCULATED.3IONS-SCNC: 10 MMOL/L (ref 3–16)
BUN SERPL-MCNC: 92 MG/DL (ref 7–20)
CALCIUM SERPL-MCNC: 9.1 MG/DL (ref 8.3–10.6)
CHLORIDE SERPL-SCNC: 92 MMOL/L (ref 99–110)
CO2 SERPL-SCNC: 30 MMOL/L (ref 21–32)
CREAT SERPL-MCNC: 1.8 MG/DL (ref 0.8–1.3)
GFR SERPLBLD CREATININE-BSD FMLA CKD-EPI: 40 ML/MIN/{1.73_M2}
GLUCOSE BLD-MCNC: 216 MG/DL (ref 70–99)
GLUCOSE BLD-MCNC: 261 MG/DL (ref 70–99)
GLUCOSE BLD-MCNC: 274 MG/DL (ref 70–99)
GLUCOSE BLD-MCNC: 339 MG/DL (ref 70–99)
GLUCOSE SERPL-MCNC: 259 MG/DL (ref 70–99)
MAGNESIUM SERPL-MCNC: 2.4 MG/DL (ref 1.8–2.4)
PERFORMED ON: ABNORMAL
PHOSPHATE SERPL-MCNC: 4.1 MG/DL (ref 2.5–4.9)
POTASSIUM SERPL-SCNC: 4.2 MMOL/L (ref 3.5–5.1)
SODIUM SERPL-SCNC: 132 MMOL/L (ref 136–145)

## 2023-05-21 PROCEDURE — 6370000000 HC RX 637 (ALT 250 FOR IP): Performed by: HOSPITALIST

## 2023-05-21 PROCEDURE — 6370000000 HC RX 637 (ALT 250 FOR IP): Performed by: NURSE PRACTITIONER

## 2023-05-21 PROCEDURE — 6360000002 HC RX W HCPCS: Performed by: NURSE PRACTITIONER

## 2023-05-21 PROCEDURE — 2700000000 HC OXYGEN THERAPY PER DAY

## 2023-05-21 PROCEDURE — 2580000003 HC RX 258: Performed by: NURSE PRACTITIONER

## 2023-05-21 PROCEDURE — 83735 ASSAY OF MAGNESIUM: CPT

## 2023-05-21 PROCEDURE — 36415 COLL VENOUS BLD VENIPUNCTURE: CPT

## 2023-05-21 PROCEDURE — 2060000000 HC ICU INTERMEDIATE R&B

## 2023-05-21 PROCEDURE — 94761 N-INVAS EAR/PLS OXIMETRY MLT: CPT

## 2023-05-21 PROCEDURE — 80069 RENAL FUNCTION PANEL: CPT

## 2023-05-21 PROCEDURE — 99232 SBSQ HOSP IP/OBS MODERATE 35: CPT | Performed by: INTERNAL MEDICINE

## 2023-05-21 RX ORDER — OXYCODONE HYDROCHLORIDE AND ACETAMINOPHEN 5; 325 MG/1; MG/1
1 TABLET ORAL EVERY 6 HOURS PRN
Status: DISCONTINUED | OUTPATIENT
Start: 2023-05-21 | End: 2023-05-24 | Stop reason: HOSPADM

## 2023-05-21 RX ADMIN — CARVEDILOL 3.12 MG: 3.12 TABLET, FILM COATED ORAL at 08:04

## 2023-05-21 RX ADMIN — INSULIN LISPRO 4 UNITS: 100 INJECTION, SOLUTION INTRAVENOUS; SUBCUTANEOUS at 21:37

## 2023-05-21 RX ADMIN — INSULIN LISPRO 4 UNITS: 100 INJECTION, SOLUTION INTRAVENOUS; SUBCUTANEOUS at 00:55

## 2023-05-21 RX ADMIN — DORZOLAMIDE HCL 1 DROP: 20 SOLUTION/ DROPS OPHTHALMIC at 21:43

## 2023-05-21 RX ADMIN — SPIRONOLACTONE 25 MG: 25 TABLET ORAL at 08:03

## 2023-05-21 RX ADMIN — INSULIN GLARGINE 40 UNITS: 100 INJECTION, SOLUTION SUBCUTANEOUS at 21:37

## 2023-05-21 RX ADMIN — BRIMONIDINE TARTRATE AND TIMOLOL MALEATE 1 DROP: 2; 5 SOLUTION OPHTHALMIC at 08:09

## 2023-05-21 RX ADMIN — ATORVASTATIN CALCIUM 80 MG: 80 TABLET, FILM COATED ORAL at 21:37

## 2023-05-21 RX ADMIN — HYDROCODONE BITARTRATE AND ACETAMINOPHEN 1 TABLET: 7.5; 325 TABLET ORAL at 08:03

## 2023-05-21 RX ADMIN — BRIMONIDINE TARTRATE AND TIMOLOL MALEATE 1 DROP: 2; 5 SOLUTION OPHTHALMIC at 21:42

## 2023-05-21 RX ADMIN — DOCUSATE SODIUM 100 MG: 100 CAPSULE, LIQUID FILLED ORAL at 08:03

## 2023-05-21 RX ADMIN — INSULIN LISPRO 4 UNITS: 100 INJECTION, SOLUTION INTRAVENOUS; SUBCUTANEOUS at 11:51

## 2023-05-21 RX ADMIN — HYDROCODONE BITARTRATE AND ACETAMINOPHEN 1 TABLET: 7.5; 325 TABLET ORAL at 01:01

## 2023-05-21 RX ADMIN — INSULIN LISPRO 2 UNITS: 100 INJECTION, SOLUTION INTRAVENOUS; SUBCUTANEOUS at 08:18

## 2023-05-21 RX ADMIN — FERROUS SULFATE TAB 325 MG (65 MG ELEMENTAL FE) 325 MG: 325 (65 FE) TAB at 08:04

## 2023-05-21 RX ADMIN — FERROUS SULFATE TAB 325 MG (65 MG ELEMENTAL FE) 325 MG: 325 (65 FE) TAB at 21:37

## 2023-05-21 RX ADMIN — PREGABALIN 50 MG: 25 CAPSULE ORAL at 21:36

## 2023-05-21 RX ADMIN — PANTOPRAZOLE SODIUM 40 MG: 40 TABLET, DELAYED RELEASE ORAL at 08:03

## 2023-05-21 RX ADMIN — FUROSEMIDE 40 MG: 10 INJECTION, SOLUTION INTRAMUSCULAR; INTRAVENOUS at 17:08

## 2023-05-21 RX ADMIN — SENNOSIDES 8.6 MG: 8.6 TABLET, COATED ORAL at 21:37

## 2023-05-21 RX ADMIN — ALLOPURINOL 300 MG: 300 TABLET ORAL at 08:03

## 2023-05-21 RX ADMIN — GUAIFENESIN 600 MG: 600 TABLET, EXTENDED RELEASE ORAL at 21:50

## 2023-05-21 RX ADMIN — HYDROCODONE BITARTRATE AND ACETAMINOPHEN 1 TABLET: 7.5; 325 TABLET ORAL at 15:34

## 2023-05-21 RX ADMIN — INSULIN LISPRO 4 UNITS: 100 INJECTION, SOLUTION INTRAVENOUS; SUBCUTANEOUS at 17:07

## 2023-05-21 RX ADMIN — DOCUSATE SODIUM 100 MG: 100 CAPSULE, LIQUID FILLED ORAL at 21:37

## 2023-05-21 RX ADMIN — CARVEDILOL 3.12 MG: 3.12 TABLET, FILM COATED ORAL at 17:07

## 2023-05-21 RX ADMIN — DORZOLAMIDE HCL 1 DROP: 20 SOLUTION/ DROPS OPHTHALMIC at 08:09

## 2023-05-21 RX ADMIN — NALOXEGOL OXALATE 25 MG: 25 TABLET, FILM COATED ORAL at 08:05

## 2023-05-21 RX ADMIN — SODIUM CHLORIDE, PRESERVATIVE FREE 10 ML: 5 INJECTION INTRAVENOUS at 08:10

## 2023-05-21 RX ADMIN — OXYCODONE HYDROCHLORIDE AND ACETAMINOPHEN 500 MG: 500 TABLET ORAL at 08:04

## 2023-05-21 RX ADMIN — TAMSULOSIN HYDROCHLORIDE 0.8 MG: 0.4 CAPSULE ORAL at 08:04

## 2023-05-21 RX ADMIN — PREGABALIN 50 MG: 25 CAPSULE ORAL at 09:40

## 2023-05-21 RX ADMIN — ENOXAPARIN SODIUM 40 MG: 100 INJECTION SUBCUTANEOUS at 08:05

## 2023-05-21 RX ADMIN — GUAIFENESIN 600 MG: 600 TABLET, EXTENDED RELEASE ORAL at 08:03

## 2023-05-21 RX ADMIN — SENNOSIDES 8.6 MG: 8.6 TABLET, COATED ORAL at 08:04

## 2023-05-21 RX ADMIN — FUROSEMIDE 40 MG: 10 INJECTION, SOLUTION INTRAMUSCULAR; INTRAVENOUS at 08:05

## 2023-05-21 ASSESSMENT — PAIN SCALES - GENERAL
PAINLEVEL_OUTOF10: 8

## 2023-05-21 ASSESSMENT — PAIN DESCRIPTION - LOCATION
LOCATION: LEG
LOCATION: SHOULDER;LEG

## 2023-05-21 ASSESSMENT — PAIN DESCRIPTION - DESCRIPTORS
DESCRIPTORS: ACHING;DISCOMFORT

## 2023-05-21 ASSESSMENT — PAIN DESCRIPTION - ORIENTATION
ORIENTATION: RIGHT;LEFT
ORIENTATION: RIGHT;LEFT
ORIENTATION: RIGHT
ORIENTATION: LEFT;RIGHT

## 2023-05-21 ASSESSMENT — PAIN DESCRIPTION - PAIN TYPE
TYPE: NEUROPATHIC PAIN
TYPE: NEUROPATHIC PAIN

## 2023-05-22 ENCOUNTER — APPOINTMENT (OUTPATIENT)
Dept: ULTRASOUND IMAGING | Age: 68
DRG: 291 | End: 2023-05-22
Payer: MEDICARE

## 2023-05-22 PROBLEM — N18.32 ANEMIA DUE TO STAGE 3B CHRONIC KIDNEY DISEASE (HCC): Status: ACTIVE | Noted: 2023-05-22

## 2023-05-22 PROBLEM — D63.1 ANEMIA DUE TO STAGE 3B CHRONIC KIDNEY DISEASE (HCC): Status: ACTIVE | Noted: 2023-05-22

## 2023-05-22 PROBLEM — J96.21 ACUTE ON CHRONIC RESPIRATORY FAILURE WITH HYPOXEMIA (HCC): Status: ACTIVE | Noted: 2022-05-29

## 2023-05-22 LAB
ALBUMIN SERPL-MCNC: 3.5 G/DL (ref 3.4–5)
ANION GAP SERPL CALCULATED.3IONS-SCNC: 10 MMOL/L (ref 3–16)
BUN SERPL-MCNC: 88 MG/DL (ref 7–20)
CALCIUM SERPL-MCNC: 9 MG/DL (ref 8.3–10.6)
CHLORIDE SERPL-SCNC: 91 MMOL/L (ref 99–110)
CO2 SERPL-SCNC: 29 MMOL/L (ref 21–32)
CREAT SERPL-MCNC: 1.5 MG/DL (ref 0.8–1.3)
GFR SERPLBLD CREATININE-BSD FMLA CKD-EPI: 50 ML/MIN/{1.73_M2}
GLUCOSE BLD-MCNC: 262 MG/DL (ref 70–99)
GLUCOSE BLD-MCNC: 280 MG/DL (ref 70–99)
GLUCOSE BLD-MCNC: 297 MG/DL (ref 70–99)
GLUCOSE BLD-MCNC: 307 MG/DL (ref 70–99)
GLUCOSE SERPL-MCNC: 327 MG/DL (ref 70–99)
MAGNESIUM SERPL-MCNC: 2.3 MG/DL (ref 1.8–2.4)
NT-PROBNP SERPL-MCNC: 4219 PG/ML (ref 0–124)
PERFORMED ON: ABNORMAL
PHOSPHATE SERPL-MCNC: 4.1 MG/DL (ref 2.5–4.9)
POTASSIUM SERPL-SCNC: 4 MMOL/L (ref 3.5–5.1)
SODIUM SERPL-SCNC: 130 MMOL/L (ref 136–145)

## 2023-05-22 PROCEDURE — 80069 RENAL FUNCTION PANEL: CPT

## 2023-05-22 PROCEDURE — 76705 ECHO EXAM OF ABDOMEN: CPT

## 2023-05-22 PROCEDURE — 6370000000 HC RX 637 (ALT 250 FOR IP): Performed by: INTERNAL MEDICINE

## 2023-05-22 PROCEDURE — 2060000000 HC ICU INTERMEDIATE R&B

## 2023-05-22 PROCEDURE — 94761 N-INVAS EAR/PLS OXIMETRY MLT: CPT

## 2023-05-22 PROCEDURE — 2580000003 HC RX 258: Performed by: NURSE PRACTITIONER

## 2023-05-22 PROCEDURE — 83880 ASSAY OF NATRIURETIC PEPTIDE: CPT

## 2023-05-22 PROCEDURE — 6360000002 HC RX W HCPCS: Performed by: NURSE PRACTITIONER

## 2023-05-22 PROCEDURE — 83735 ASSAY OF MAGNESIUM: CPT

## 2023-05-22 PROCEDURE — 6370000000 HC RX 637 (ALT 250 FOR IP): Performed by: NURSE PRACTITIONER

## 2023-05-22 PROCEDURE — 36415 COLL VENOUS BLD VENIPUNCTURE: CPT

## 2023-05-22 PROCEDURE — 2700000000 HC OXYGEN THERAPY PER DAY

## 2023-05-22 RX ORDER — ASPIRIN 81 MG/1
81 TABLET, CHEWABLE ORAL DAILY
Status: DISCONTINUED | OUTPATIENT
Start: 2023-05-22 | End: 2023-05-24 | Stop reason: HOSPADM

## 2023-05-22 RX ORDER — INSULIN LISPRO 100 [IU]/ML
0-4 INJECTION, SOLUTION INTRAVENOUS; SUBCUTANEOUS NIGHTLY
Status: DISCONTINUED | OUTPATIENT
Start: 2023-05-22 | End: 2023-05-24 | Stop reason: HOSPADM

## 2023-05-22 RX ORDER — INSULIN LISPRO 100 [IU]/ML
0-16 INJECTION, SOLUTION INTRAVENOUS; SUBCUTANEOUS
Status: DISCONTINUED | OUTPATIENT
Start: 2023-05-22 | End: 2023-05-24 | Stop reason: HOSPADM

## 2023-05-22 RX ADMIN — OXYCODONE AND ACETAMINOPHEN 1 TABLET: 5; 325 TABLET ORAL at 00:34

## 2023-05-22 RX ADMIN — CARVEDILOL 3.12 MG: 3.12 TABLET, FILM COATED ORAL at 08:07

## 2023-05-22 RX ADMIN — BRIMONIDINE TARTRATE AND TIMOLOL MALEATE 1 DROP: 2; 5 SOLUTION OPHTHALMIC at 08:13

## 2023-05-22 RX ADMIN — DORZOLAMIDE HCL 1 DROP: 20 SOLUTION/ DROPS OPHTHALMIC at 21:21

## 2023-05-22 RX ADMIN — ATORVASTATIN CALCIUM 80 MG: 80 TABLET, FILM COATED ORAL at 21:14

## 2023-05-22 RX ADMIN — PREGABALIN 50 MG: 25 CAPSULE ORAL at 10:47

## 2023-05-22 RX ADMIN — FERROUS SULFATE TAB 325 MG (65 MG ELEMENTAL FE) 325 MG: 325 (65 FE) TAB at 08:04

## 2023-05-22 RX ADMIN — GUAIFENESIN 600 MG: 600 TABLET, EXTENDED RELEASE ORAL at 21:14

## 2023-05-22 RX ADMIN — BRIMONIDINE TARTRATE AND TIMOLOL MALEATE 1 DROP: 2; 5 SOLUTION OPHTHALMIC at 21:28

## 2023-05-22 RX ADMIN — OXYCODONE AND ACETAMINOPHEN 1 TABLET: 5; 325 TABLET ORAL at 06:54

## 2023-05-22 RX ADMIN — OXYCODONE HYDROCHLORIDE AND ACETAMINOPHEN 500 MG: 500 TABLET ORAL at 08:07

## 2023-05-22 RX ADMIN — INSULIN LISPRO 12 UNITS: 100 INJECTION, SOLUTION INTRAVENOUS; SUBCUTANEOUS at 16:52

## 2023-05-22 RX ADMIN — ASPIRIN 81 MG 81 MG: 81 TABLET ORAL at 16:52

## 2023-05-22 RX ADMIN — NALOXEGOL OXALATE 25 MG: 25 TABLET, FILM COATED ORAL at 06:54

## 2023-05-22 RX ADMIN — PANTOPRAZOLE SODIUM 40 MG: 40 TABLET, DELAYED RELEASE ORAL at 08:07

## 2023-05-22 RX ADMIN — FERROUS SULFATE TAB 325 MG (65 MG ELEMENTAL FE) 325 MG: 325 (65 FE) TAB at 21:14

## 2023-05-22 RX ADMIN — PREGABALIN 50 MG: 25 CAPSULE ORAL at 21:24

## 2023-05-22 RX ADMIN — DOCUSATE SODIUM 100 MG: 100 CAPSULE, LIQUID FILLED ORAL at 21:14

## 2023-05-22 RX ADMIN — SPIRONOLACTONE 25 MG: 25 TABLET ORAL at 08:07

## 2023-05-22 RX ADMIN — SODIUM CHLORIDE, PRESERVATIVE FREE 10 ML: 5 INJECTION INTRAVENOUS at 08:13

## 2023-05-22 RX ADMIN — FUROSEMIDE 40 MG: 10 INJECTION, SOLUTION INTRAMUSCULAR; INTRAVENOUS at 08:04

## 2023-05-22 RX ADMIN — INSULIN GLARGINE 40 UNITS: 100 INJECTION, SOLUTION SUBCUTANEOUS at 21:14

## 2023-05-22 RX ADMIN — DORZOLAMIDE HCL 1 DROP: 20 SOLUTION/ DROPS OPHTHALMIC at 08:13

## 2023-05-22 RX ADMIN — ALLOPURINOL 300 MG: 300 TABLET ORAL at 08:04

## 2023-05-22 RX ADMIN — DOCUSATE SODIUM 100 MG: 100 CAPSULE, LIQUID FILLED ORAL at 08:07

## 2023-05-22 RX ADMIN — SENNOSIDES 8.6 MG: 8.6 TABLET, COATED ORAL at 08:05

## 2023-05-22 RX ADMIN — GUAIFENESIN 600 MG: 600 TABLET, EXTENDED RELEASE ORAL at 08:07

## 2023-05-22 RX ADMIN — INSULIN LISPRO 4 UNITS: 100 INJECTION, SOLUTION INTRAVENOUS; SUBCUTANEOUS at 11:53

## 2023-05-22 RX ADMIN — FUROSEMIDE 40 MG: 10 INJECTION, SOLUTION INTRAMUSCULAR; INTRAVENOUS at 16:52

## 2023-05-22 RX ADMIN — TAMSULOSIN HYDROCHLORIDE 0.8 MG: 0.4 CAPSULE ORAL at 06:54

## 2023-05-22 RX ADMIN — SODIUM CHLORIDE, PRESERVATIVE FREE 10 ML: 5 INJECTION INTRAVENOUS at 21:20

## 2023-05-22 RX ADMIN — ENOXAPARIN SODIUM 40 MG: 100 INJECTION SUBCUTANEOUS at 08:07

## 2023-05-22 RX ADMIN — SENNOSIDES 8.6 MG: 8.6 TABLET, COATED ORAL at 21:14

## 2023-05-22 RX ADMIN — CARVEDILOL 3.12 MG: 3.12 TABLET, FILM COATED ORAL at 16:52

## 2023-05-22 RX ADMIN — INSULIN LISPRO 4 UNITS: 100 INJECTION, SOLUTION INTRAVENOUS; SUBCUTANEOUS at 07:49

## 2023-05-22 ASSESSMENT — PAIN SCALES - GENERAL
PAINLEVEL_OUTOF10: 0
PAINLEVEL_OUTOF10: 3

## 2023-05-22 NOTE — CARE COORDINATION
Case Management Assessment  Initial Evaluation    Date/Time of Evaluation: 5/22/2023 11:09 AM  Assessment Completed by: Luca Boo RN    If patient is discharged prior to next notation, then this note serves as note for discharge by case management. Patient Name: Patricia Colorado                   YOB: 1955  Diagnosis: Acute clinical systolic heart failure ([facility] [I50.21]  Acute on chronic diastolic (congestive) heart failure ([facility] [I50.33]                   Date / Time: 5/19/2023  6:03 PM    Patient Admission Status: Inpatient   Readmission Risk (Low < 19, Mod (19-27), High > 27): Readmission Risk Score: 23.5    Current PCP: Cinthya Rocha MD  PCP verified by CM? (P) Yes    Chart Reviewed: Yes      History Provided by: (P) Patient  Patient Orientation: (P) Alert and Oriented    Patient Cognition: (P) Alert    Hospitalization in the last 30 days (Readmission):  No    If yes, Readmission Assessment in  Navigator will be completed. Advance Directives:      Code Status: Limited   Patient's Primary Decision Maker is: (P) Legal Next of Kin    Primary Decision MakerThomos Marcy - [facility] - [phone]    Discharge Planning:    Patient lives with: (P) Other (Comment) (ECF facility) [facility]: (P) [facility]: (P) Other (Comment) (Adriana Anderson)  Patient Support Systems include: (P) Family Members   Current Financial resources: (P) Medicare, Medicaid  Current community resources: (P) ECF/Home Care  Current services prior to admission: (P) Oxygen Therapy, Extended Care Facility            Current DME:              Type of Home Care services:  (P) None    ADLS  Prior functional level: (P) Assistance with the following:, Cooking, Housework  Current functional level: (P) Assistance with the following:, Cooking, Housework    PT AM-PAC: 18 /24  OT AM-PAC:   /24    Family can provide assistance at DC: (P) Other (comment) (n/a.  Adriana Anderson)  Would you like Case Management

## 2023-05-22 NOTE — FLOWSHEET NOTE
05/21/23 2036   Assessment   Charting Type Shift assessment   Psychosocial   Psychosocial (WDL) WDL   Neurological   Neuro (WDL) WDL   Level of Consciousness 0   [place] Coma Scale   Eye Opening 4   Best Verbal Response 5   Best Motor Response 6   [place] Coma Scale Score 15   HEENT (Head, Ears, Eyes, Nose, & Throat)   HEENT (WDL) X   Right Eye Blind   Left Eye Impaired vision   Teeth Dentures upper;Dentures lower  (Lower dentures left at [facility])   Respiratory   Respiratory (WDL) X   Respiratory Pattern Regular   Respiratory Depth Normal   Respiratory Quality/Effort Unlabored   Chest Assessment Chest expansion symmetrical;Trachea midline   L Breath Sounds Diminished   R Breath Sounds Diminished   Subcutaneous Air/Crepitus None   Cardiac   Cardiac (WDL) WDL   Gastrointestinal   Abdominal (WDL) X   Abdomen Inspection Obese;Rounded;Distended   RUQ Bowel Sounds Active   LUQ Bowel Sounds Active   RLQ Bowel Sounds Active   LLQ Bowel Sounds Active   Tenderness Tenderness   Genitourinary   Genitourinary (WDL) WDL   Suprapubic Tenderness No   Dysuria (Pain/Burning w/Urination) No   Urine Assessment   Urine Color Yellow/straw   Urine Appearance Clear   Urine Odor No odor   Peripheral Vascular   Peripheral Vascular (WDL) X   Edema Right lower extremity; Left lower extremity   RLE Edema +2;Non-pitting   LLE Edema +2;Non-pitting   RLE Neurovascular Assessment   Capillary Refill Less than/Equal to 3 seconds   Color Rosales;Ecchymosis   Temperature Warm   R Pedal Pulse +2   LLE Neurovascular Assessment   Capillary Refill Less than/Equal to 3 seconds   Color Rosales;Ecchymosis   Temperature Warm   L Pedal Pulse +2   Skin Integumentary    Skin Integumentary (WDL) X   Skin Color Rosales   Musculoskeletal   Musculoskeletal (WDL) X   RL Extremity Weakness   LL Extremity Weakness

## 2023-05-22 NOTE — CONSULTS
[address] [facility] Makenna Seat 1955    History:  Past Medical History:   Diagnosis Date    Anemia     Angina     Arthritis     CAD (coronary artery disease)     CHF (congestive heart failure) ([facility])     CKD (chronic kidney disease) stage 3, GFR 30-59 ml/min ([facility])     Clostridium difficile diarrhea 3/16/12; 2/29/12    positive stool toxin    Diabetes mellitus ([address]     Diabetic neuropathy ([facility]     feet and legs    Disease of blood and blood forming organ     Dizziness     When he moves his head/ loses his balance    GERD (gastroesophageal reflux disease)     gastric ulcer    Headache     Hearing loss     Hyperlipidemia     Hypertension     Kidney stone 2002    Refusal of blood transfusions as patient is Pentecostalism     Sleep apnea     Tinnitus     Venous ulcer ([facility]     LLE    Wound, open 1/13/2012       ECHO:  8/26/22   EF 50-55%  HgA1C:  2/20/23   8.0  Iron Saturation:  4/24/22   15  Ferritin:     ACE/ARB/ARNI:   BB: Carvedilol 3.125 mg BID  Spironolactone:  25 mg daily  Ferrous Sulfate 325 mg BID   SGLT2:    Last Hospital Admission:   2/20/23   acute on chronic CHF  Discharge plans:  return to [address] [place] [address]: patient has advance directives scanned in the chart    Chart review completed. Patient a 70year old male, admitted for acute clinical systolic heart failure. Hospital day 3, currently on C4 level of care. Writer very familiar with patient due to several previous admissions. Cardiology, nephrology, and hospital medicine all managing care. IV diuretics in place. Pt had a 13 lbs weight gain at facility. Pt with move to facility in the last year. During this time readmissions have significantly lessened as they assist with monitoring for s/s of CHF including daily weights. They also assist with diet and fluid restrictions.        Patient recent weights and intake/output reviewed:    Patient Vitals for the past 96 hrs (Last 3

## 2023-05-22 NOTE — DISCHARGE INSTRUCTIONS
- Repeat BMP in one week   - Continue bowel regimen  - Wound Care Plan of Care: Wound 05/23/23 Pretibial Right;Left medial right leg fluid filled blister and lateral left leg couple fluid filled blisters-Dressing/Treatment: Non adherent, Roll gauze, Ace wrap     Recommend:  Cleanse bi lateral legs daily with normal saline, pat dry. Protect fluid filled blisters with non adherent pad, wrap legs with kerlix, then Ace Wrap. *Patient does not like full wrap. States he can not move legs and feet. Can apply silicone lotion to lower legs  Goal is to apply ACE wraps to BLE daily before ambulating, can remove at bedtime           Heart Failure Resources:    Heart Failure Interactive Workbook:   Go to www.[employer].com/aha-heartfailure for a Free Heart Failure Interactive Workbook provided by [employer]. This interactive workbook will provide information on Healthier Living with Heart Failure. Please copy and paste link into search bar. Use your mouse to scroll through the pages. HF [place] carmina:   Heart Failure Free smart phone carmina available for iPhone and Android download. Use your phone to track sodium intake, fluid intake, symptoms, and weight. Low Sodium Diet:  Go to www. [employer] website for [employer] which is Low Sodium! [employer] is a dialysis company, but this website offers free seasonal cookbooks. Each quarter, they will release 25-30 new recipes with a breakdown of calories, sodium, and glucose. Recipes:   Go to www.[employer]/recipes website for free recipes. Home Exercise Program:     Identification of Green/yellow/Red zones: You should be able to identify when you feel good (green zone), if you have 1-2 symptoms of HF (yellow zone), or if you are in need of medical attention (red zone). In your CHF education folder you were provided a stop light tool to outline this information. We want to you to rate your exertion levels:     Our therapy team has discussed

## 2023-05-23 LAB
ANION GAP SERPL CALCULATED.3IONS-SCNC: 12 MMOL/L (ref 3–16)
BUN SERPL-MCNC: 90 MG/DL (ref 7–20)
CALCIUM SERPL-MCNC: 9.2 MG/DL (ref 8.3–10.6)
CHLORIDE SERPL-SCNC: 92 MMOL/L (ref 99–110)
CO2 SERPL-SCNC: 29 MMOL/L (ref 21–32)
CREAT SERPL-MCNC: 1.7 MG/DL (ref 0.8–1.3)
GFR SERPLBLD CREATININE-BSD FMLA CKD-EPI: 43 ML/MIN/{1.73_M2}
GLUCOSE BLD-MCNC: 196 MG/DL (ref 70–99)
GLUCOSE BLD-MCNC: 254 MG/DL (ref 70–99)
GLUCOSE BLD-MCNC: 283 MG/DL (ref 70–99)
GLUCOSE BLD-MCNC: 293 MG/DL (ref 70–99)
GLUCOSE SERPL-MCNC: 323 MG/DL (ref 70–99)
MAGNESIUM SERPL-MCNC: 2.3 MG/DL (ref 1.8–2.4)
PERFORMED ON: ABNORMAL
POTASSIUM SERPL-SCNC: 4.2 MMOL/L (ref 3.5–5.1)
SODIUM SERPL-SCNC: 133 MMOL/L (ref 136–145)

## 2023-05-23 PROCEDURE — 97116 GAIT TRAINING THERAPY: CPT

## 2023-05-23 PROCEDURE — 6370000000 HC RX 637 (ALT 250 FOR IP): Performed by: NURSE PRACTITIONER

## 2023-05-23 PROCEDURE — 80048 BASIC METABOLIC PNL TOTAL CA: CPT

## 2023-05-23 PROCEDURE — 6370000000 HC RX 637 (ALT 250 FOR IP): Performed by: INTERNAL MEDICINE

## 2023-05-23 PROCEDURE — 97110 THERAPEUTIC EXERCISES: CPT

## 2023-05-23 PROCEDURE — 2060000000 HC ICU INTERMEDIATE R&B

## 2023-05-23 PROCEDURE — 2700000000 HC OXYGEN THERAPY PER DAY

## 2023-05-23 PROCEDURE — 6360000002 HC RX W HCPCS: Performed by: NURSE PRACTITIONER

## 2023-05-23 PROCEDURE — 36415 COLL VENOUS BLD VENIPUNCTURE: CPT

## 2023-05-23 PROCEDURE — 94761 N-INVAS EAR/PLS OXIMETRY MLT: CPT

## 2023-05-23 PROCEDURE — 2580000003 HC RX 258: Performed by: NURSE PRACTITIONER

## 2023-05-23 PROCEDURE — 83735 ASSAY OF MAGNESIUM: CPT

## 2023-05-23 RX ORDER — TORSEMIDE 100 MG/1
100 TABLET ORAL DAILY
Status: DISCONTINUED | OUTPATIENT
Start: 2023-05-23 | End: 2023-05-24 | Stop reason: HOSPADM

## 2023-05-23 RX ORDER — ENOXAPARIN SODIUM 100 MG/ML
30 INJECTION SUBCUTANEOUS 2 TIMES DAILY
Status: DISCONTINUED | OUTPATIENT
Start: 2023-05-24 | End: 2023-05-24 | Stop reason: HOSPADM

## 2023-05-23 RX ADMIN — FERROUS SULFATE TAB 325 MG (65 MG ELEMENTAL FE) 325 MG: 325 (65 FE) TAB at 20:16

## 2023-05-23 RX ADMIN — SODIUM CHLORIDE, PRESERVATIVE FREE 10 ML: 5 INJECTION INTRAVENOUS at 20:28

## 2023-05-23 RX ADMIN — INSULIN LISPRO 8 UNITS: 100 INJECTION, SOLUTION INTRAVENOUS; SUBCUTANEOUS at 08:30

## 2023-05-23 RX ADMIN — GUAIFENESIN 600 MG: 600 TABLET, EXTENDED RELEASE ORAL at 08:34

## 2023-05-23 RX ADMIN — SPIRONOLACTONE 25 MG: 25 TABLET ORAL at 08:34

## 2023-05-23 RX ADMIN — DOCUSATE SODIUM 100 MG: 100 CAPSULE, LIQUID FILLED ORAL at 20:16

## 2023-05-23 RX ADMIN — BRIMONIDINE TARTRATE AND TIMOLOL MALEATE 1 DROP: 2; 5 SOLUTION OPHTHALMIC at 20:19

## 2023-05-23 RX ADMIN — ENOXAPARIN SODIUM 40 MG: 100 INJECTION SUBCUTANEOUS at 08:33

## 2023-05-23 RX ADMIN — CARVEDILOL 3.12 MG: 3.12 TABLET, FILM COATED ORAL at 17:24

## 2023-05-23 RX ADMIN — DORZOLAMIDE HCL 1 DROP: 20 SOLUTION/ DROPS OPHTHALMIC at 20:20

## 2023-05-23 RX ADMIN — TAMSULOSIN HYDROCHLORIDE 0.8 MG: 0.4 CAPSULE ORAL at 05:35

## 2023-05-23 RX ADMIN — PREGABALIN 50 MG: 25 CAPSULE ORAL at 22:09

## 2023-05-23 RX ADMIN — OXYCODONE AND ACETAMINOPHEN 1 TABLET: 5; 325 TABLET ORAL at 20:16

## 2023-05-23 RX ADMIN — SODIUM CHLORIDE, PRESERVATIVE FREE 10 ML: 5 INJECTION INTRAVENOUS at 08:35

## 2023-05-23 RX ADMIN — DOCUSATE SODIUM 100 MG: 100 CAPSULE, LIQUID FILLED ORAL at 08:34

## 2023-05-23 RX ADMIN — TORSEMIDE 100 MG: 100 TABLET ORAL at 11:46

## 2023-05-23 RX ADMIN — PREGABALIN 50 MG: 25 CAPSULE ORAL at 11:46

## 2023-05-23 RX ADMIN — PANTOPRAZOLE SODIUM 40 MG: 40 TABLET, DELAYED RELEASE ORAL at 08:34

## 2023-05-23 RX ADMIN — OXYCODONE AND ACETAMINOPHEN 1 TABLET: 5; 325 TABLET ORAL at 11:46

## 2023-05-23 RX ADMIN — FERROUS SULFATE TAB 325 MG (65 MG ELEMENTAL FE) 325 MG: 325 (65 FE) TAB at 08:34

## 2023-05-23 RX ADMIN — DORZOLAMIDE HCL 1 DROP: 20 SOLUTION/ DROPS OPHTHALMIC at 08:42

## 2023-05-23 RX ADMIN — ASPIRIN 81 MG 81 MG: 81 TABLET ORAL at 08:34

## 2023-05-23 RX ADMIN — ATORVASTATIN CALCIUM 80 MG: 80 TABLET, FILM COATED ORAL at 20:16

## 2023-05-23 RX ADMIN — OXYCODONE HYDROCHLORIDE AND ACETAMINOPHEN 500 MG: 500 TABLET ORAL at 08:34

## 2023-05-23 RX ADMIN — INSULIN GLARGINE 40 UNITS: 100 INJECTION, SOLUTION SUBCUTANEOUS at 20:16

## 2023-05-23 RX ADMIN — BRIMONIDINE TARTRATE AND TIMOLOL MALEATE 1 DROP: 2; 5 SOLUTION OPHTHALMIC at 08:42

## 2023-05-23 RX ADMIN — SENNOSIDES 8.6 MG: 8.6 TABLET, COATED ORAL at 20:16

## 2023-05-23 RX ADMIN — INSULIN LISPRO 8 UNITS: 100 INJECTION, SOLUTION INTRAVENOUS; SUBCUTANEOUS at 11:58

## 2023-05-23 RX ADMIN — OXYCODONE AND ACETAMINOPHEN 1 TABLET: 5; 325 TABLET ORAL at 04:40

## 2023-05-23 RX ADMIN — ALLOPURINOL 300 MG: 300 TABLET ORAL at 08:34

## 2023-05-23 RX ADMIN — GUAIFENESIN 600 MG: 600 TABLET, EXTENDED RELEASE ORAL at 20:16

## 2023-05-23 RX ADMIN — CARVEDILOL 3.12 MG: 3.12 TABLET, FILM COATED ORAL at 08:34

## 2023-05-23 RX ADMIN — SENNOSIDES 8.6 MG: 8.6 TABLET, COATED ORAL at 08:34

## 2023-05-23 ASSESSMENT — PAIN DESCRIPTION - LOCATION
LOCATION: FOOT
LOCATION: BACK;SHOULDER;NECK
LOCATION: FOOT
LOCATION: GENERALIZED
LOCATION: BACK;NECK;SHOULDER

## 2023-05-23 ASSESSMENT — PAIN DESCRIPTION - PAIN TYPE
TYPE: CHRONIC PAIN;NEUROPATHIC PAIN

## 2023-05-23 ASSESSMENT — PAIN SCALES - GENERAL
PAINLEVEL_OUTOF10: 0
PAINLEVEL_OUTOF10: 6
PAINLEVEL_OUTOF10: 8
PAINLEVEL_OUTOF10: 2
PAINLEVEL_OUTOF10: 8
PAINLEVEL_OUTOF10: 7
PAINLEVEL_OUTOF10: 2
PAINLEVEL_OUTOF10: 7
PAINLEVEL_OUTOF10: 0
PAINLEVEL_OUTOF10: 7
PAINLEVEL_OUTOF10: 2

## 2023-05-23 ASSESSMENT — PAIN DESCRIPTION - DESCRIPTORS
DESCRIPTORS: BURNING;ACHING;DISCOMFORT
DESCRIPTORS: ACHING;BURNING;DISCOMFORT
DESCRIPTORS: ACHING;DISCOMFORT

## 2023-05-23 ASSESSMENT — PAIN DESCRIPTION - ONSET
ONSET: AWAKENED FROM SLEEP
ONSET: ON-GOING
ONSET: ON-GOING

## 2023-05-23 ASSESSMENT — PAIN - FUNCTIONAL ASSESSMENT
PAIN_FUNCTIONAL_ASSESSMENT: PREVENTS OR INTERFERES SOME ACTIVE ACTIVITIES AND ADLS

## 2023-05-23 ASSESSMENT — PAIN DESCRIPTION - FREQUENCY
FREQUENCY: CONTINUOUS
FREQUENCY: CONTINUOUS

## 2023-05-23 ASSESSMENT — PAIN DESCRIPTION - ORIENTATION
ORIENTATION: RIGHT;LEFT;MID;UPPER
ORIENTATION: RIGHT;LEFT;MID;UPPER
ORIENTATION: LEFT
ORIENTATION: LEFT

## 2023-05-23 NOTE — CONSULTS
[address] [id number] Continence Nurse  Consult Note       NAME:  Leo [facility]  MEDICAL RECORD NUMBER:  [id number]  AGE: 70 y.o. GENDER: male  : 1955  TODAY'S DATE:  2023    Subjective Hello where is other girl, Lauren Abdullahi? O.K. I want to get to bed to lie down in minute anyway. Reason for WOCN Evaluation and Assessment: check lower legs      Shay Harris is a 70 y.o. male referred by:   [] Physician  [x] Nursing  [] Other:     Wound Identification:  Wound Type: venous and diabetic  Contributing Factors: venous stasis, diabetes, poor glucose control, chronic pressure, decreased mobility, and obesity    Wound History: Last seen 2022 last seen by this Wound Care nurse. Has been seen for venous legs multiple times.   Current Wound Care Treatment:  LAWANDA    Patient Goal of Care:  [x] Wound Healing  [] Odor Control  [] Palliative Care  [] Pain Control   [] Other:         PAST MEDICAL HISTORY        Diagnosis Date    Anemia     Angina     Arthritis     CAD (coronary artery disease)     CHF (congestive heart failure) ([facility])     CKD (chronic kidney disease) stage 3, GFR 30-59 ml/min ([facility])     Clostridium difficile diarrhea 3/16/12; 12    positive stool toxin    Diabetes mellitus ([address]     Diabetic neuropathy ([facility])     feet and legs    Disease of blood and blood forming organ     Dizziness     When he moves his head/ loses his balance    GERD (gastroesophageal reflux disease)     gastric ulcer    Headache     Hearing loss     Hyperlipidemia     Hypertension     Kidney stone     Refusal of blood transfusions as patient is Confucianism     Sleep apnea     Tinnitus     Venous ulcer ([address]     LLE    Wound, open 2012       PAST SURGICAL HISTORY    Past Surgical History:   Procedure Laterality Date    CARDIAC SURGERY      Dec 27 2010, triple bypass    CHOLECYSTECTOMY  2011    HERNIA REPAIR  age1    KNEE ARTHROCENTESIS Right 10/6/2022    RIGHT SHOULDER INTRA ARTICULAR INJECTION SITE

## 2023-05-24 VITALS
OXYGEN SATURATION: 98 % | HEART RATE: 92 BPM | WEIGHT: 250.7 LBS | DIASTOLIC BLOOD PRESSURE: 77 MMHG | RESPIRATION RATE: 18 BRPM | BODY MASS INDEX: 41.77 KG/M2 | SYSTOLIC BLOOD PRESSURE: 118 MMHG | HEIGHT: 65 IN | TEMPERATURE: 97.7 F

## 2023-05-24 LAB
ANION GAP SERPL CALCULATED.3IONS-SCNC: 11 MMOL/L (ref 3–16)
BUN SERPL-MCNC: 87 MG/DL (ref 7–20)
CALCIUM SERPL-MCNC: 9.5 MG/DL (ref 8.3–10.6)
CHLORIDE SERPL-SCNC: 91 MMOL/L (ref 99–110)
CO2 SERPL-SCNC: 30 MMOL/L (ref 21–32)
CREAT SERPL-MCNC: 1.7 MG/DL (ref 0.8–1.3)
GFR SERPLBLD CREATININE-BSD FMLA CKD-EPI: 43 ML/MIN/{1.73_M2}
GLUCOSE BLD-MCNC: 176 MG/DL (ref 70–99)
GLUCOSE BLD-MCNC: 200 MG/DL (ref 70–99)
GLUCOSE BLD-MCNC: 213 MG/DL (ref 70–99)
GLUCOSE SERPL-MCNC: 258 MG/DL (ref 70–99)
MAGNESIUM SERPL-MCNC: 2.2 MG/DL (ref 1.8–2.4)
PERFORMED ON: ABNORMAL
POTASSIUM SERPL-SCNC: 4.2 MMOL/L (ref 3.5–5.1)
SODIUM SERPL-SCNC: 132 MMOL/L (ref 136–145)

## 2023-05-24 PROCEDURE — 36415 COLL VENOUS BLD VENIPUNCTURE: CPT

## 2023-05-24 PROCEDURE — 80048 BASIC METABOLIC PNL TOTAL CA: CPT

## 2023-05-24 PROCEDURE — 6370000000 HC RX 637 (ALT 250 FOR IP): Performed by: NURSE PRACTITIONER

## 2023-05-24 PROCEDURE — 2580000003 HC RX 258: Performed by: NURSE PRACTITIONER

## 2023-05-24 PROCEDURE — 6370000000 HC RX 637 (ALT 250 FOR IP): Performed by: INTERNAL MEDICINE

## 2023-05-24 PROCEDURE — 6360000002 HC RX W HCPCS: Performed by: INTERNAL MEDICINE

## 2023-05-24 PROCEDURE — 83735 ASSAY OF MAGNESIUM: CPT

## 2023-05-24 RX ORDER — METOLAZONE 2.5 MG/1
5 TABLET ORAL SEE ADMIN INSTRUCTIONS
Status: DISCONTINUED | OUTPATIENT
Start: 2023-05-25 | End: 2023-05-24 | Stop reason: HOSPADM

## 2023-05-24 RX ORDER — OXYCODONE HYDROCHLORIDE AND ACETAMINOPHEN 5; 325 MG/1; MG/1
1 TABLET ORAL EVERY 6 HOURS PRN
Qty: 6 TABLET | Refills: 0 | Status: SHIPPED | OUTPATIENT
Start: 2023-05-24 | End: 2023-05-27

## 2023-05-24 RX ORDER — ASPIRIN 81 MG/1
81 TABLET ORAL DAILY
Qty: 90 TABLET | Refills: 0
Start: 2023-05-24

## 2023-05-24 RX ORDER — METOLAZONE 5 MG/1
5 TABLET ORAL SEE ADMIN INSTRUCTIONS
Qty: 30 TABLET | Refills: 0
Start: 2023-05-25

## 2023-05-24 RX ADMIN — OXYCODONE HYDROCHLORIDE AND ACETAMINOPHEN 500 MG: 500 TABLET ORAL at 09:50

## 2023-05-24 RX ADMIN — TORSEMIDE 100 MG: 100 TABLET ORAL at 09:49

## 2023-05-24 RX ADMIN — NALOXEGOL OXALATE 25 MG: 25 TABLET, FILM COATED ORAL at 08:16

## 2023-05-24 RX ADMIN — PREGABALIN 50 MG: 25 CAPSULE ORAL at 12:18

## 2023-05-24 RX ADMIN — INSULIN LISPRO 4 UNITS: 100 INJECTION, SOLUTION INTRAVENOUS; SUBCUTANEOUS at 12:21

## 2023-05-24 RX ADMIN — DORZOLAMIDE HCL 1 DROP: 20 SOLUTION/ DROPS OPHTHALMIC at 09:48

## 2023-05-24 RX ADMIN — CARVEDILOL 3.12 MG: 3.12 TABLET, FILM COATED ORAL at 09:48

## 2023-05-24 RX ADMIN — SPIRONOLACTONE 25 MG: 25 TABLET ORAL at 09:49

## 2023-05-24 RX ADMIN — PANTOPRAZOLE SODIUM 40 MG: 40 TABLET, DELAYED RELEASE ORAL at 09:49

## 2023-05-24 RX ADMIN — TAMSULOSIN HYDROCHLORIDE 0.8 MG: 0.4 CAPSULE ORAL at 12:19

## 2023-05-24 RX ADMIN — GUAIFENESIN 600 MG: 600 TABLET, EXTENDED RELEASE ORAL at 09:50

## 2023-05-24 RX ADMIN — FERROUS SULFATE TAB 325 MG (65 MG ELEMENTAL FE) 325 MG: 325 (65 FE) TAB at 09:48

## 2023-05-24 RX ADMIN — SENNOSIDES 8.6 MG: 8.6 TABLET, COATED ORAL at 09:49

## 2023-05-24 RX ADMIN — OXYCODONE AND ACETAMINOPHEN 1 TABLET: 5; 325 TABLET ORAL at 09:50

## 2023-05-24 RX ADMIN — INSULIN LISPRO 4 UNITS: 100 INJECTION, SOLUTION INTRAVENOUS; SUBCUTANEOUS at 08:19

## 2023-05-24 RX ADMIN — ASPIRIN 81 MG 81 MG: 81 TABLET ORAL at 09:49

## 2023-05-24 RX ADMIN — BRIMONIDINE TARTRATE AND TIMOLOL MALEATE 1 DROP: 2; 5 SOLUTION OPHTHALMIC at 09:48

## 2023-05-24 RX ADMIN — CARVEDILOL 3.12 MG: 3.12 TABLET, FILM COATED ORAL at 17:30

## 2023-05-24 RX ADMIN — SODIUM CHLORIDE, PRESERVATIVE FREE 10 ML: 5 INJECTION INTRAVENOUS at 12:20

## 2023-05-24 RX ADMIN — ENOXAPARIN SODIUM 30 MG: 100 INJECTION SUBCUTANEOUS at 09:48

## 2023-05-24 RX ADMIN — DOCUSATE SODIUM 100 MG: 100 CAPSULE, LIQUID FILLED ORAL at 09:49

## 2023-05-24 RX ADMIN — ALLOPURINOL 300 MG: 300 TABLET ORAL at 09:49

## 2023-05-24 ASSESSMENT — PAIN SCALES - GENERAL
PAINLEVEL_OUTOF10: 6
PAINLEVEL_OUTOF10: 5
PAINLEVEL_OUTOF10: 0
PAINLEVEL_OUTOF10: 0
PAINLEVEL_OUTOF10: 4
PAINLEVEL_OUTOF10: 0

## 2023-05-24 ASSESSMENT — PAIN DESCRIPTION - LOCATION
LOCATION: BACK;NECK;SHOULDER

## 2023-05-24 ASSESSMENT — PAIN DESCRIPTION - PAIN TYPE
TYPE: CHRONIC PAIN;NEUROPATHIC PAIN
TYPE: CHRONIC PAIN;NEUROPATHIC PAIN

## 2023-05-24 ASSESSMENT — PAIN DESCRIPTION - FREQUENCY
FREQUENCY: CONTINUOUS
FREQUENCY: CONTINUOUS

## 2023-05-24 ASSESSMENT — PAIN DESCRIPTION - ORIENTATION
ORIENTATION: RIGHT;LEFT

## 2023-05-24 ASSESSMENT — PAIN DESCRIPTION - DESCRIPTORS
DESCRIPTORS: ACHING;TIGHTNESS;SQUEEZING
DESCRIPTORS: ACHING;TIGHTNESS;SQUEEZING

## 2023-05-24 ASSESSMENT — PAIN DESCRIPTION - ONSET
ONSET: ON-GOING
ONSET: ON-GOING

## 2023-05-24 NOTE — PLAN OF CARE
Patient's EF (Ejection Fraction) is greater than 40%    Heart Failure Medications:  Diuretics[de-identified] Furosemide and Spironolactone    (One of the following REQUIRED for EF </= 40%/SYSTOLIC FAILURE but MAY be used in EF% >40%/DIASTOLIC FAILURE)        ACE[de-identified] None        ARB[de-identified] Other         ARNI[de-identified] None    (Beta Blockers)  NON- Evidenced Based Beta Blocker (for EF% >40%/DIASTOLIC FAILURE): None    Evidenced Based Beta Blocker::(REQUIRED for EF% <40%/SYSTOLIC FAILURE) Carvedilol- Coreg  . .................................................................................................................................................. Patient's weights and intake/output reviewed: Yes    Patient's Last Weight: 249.8 lb   lbs obtained by standing scale. Difference of 1 lbs less than last documented weight. Intake/Output Summary (Last 24 hours) at 5/23/2023 9058  Last data filed at 5/23/2023 3526  Gross per 24 hour   Intake 1920 ml   Output 550 ml   Net 1370 ml       Daily Weight log at bedside and being used: [employer] [place] Provided: yes    Comorbidities Reviewed Yes    Patient has a past medical history of Anemia, Angina, Arthritis, CAD (coronary artery disease), CHF (congestive heart failure) ([address] CKD (chronic kidney disease) stage 3, GFR 30-59 ml/min ([address] Clostridium difficile diarrhea, Diabetes mellitus ([address] Diabetic neuropathy ([address] Disease of blood and blood forming organ, Dizziness, GERD (gastroesophageal reflux disease), Headache, Hearing loss, Hyperlipidemia, Hypertension, Kidney stone, Refusal of blood transfusions as patient is Denominational, Sleep apnea, Tinnitus, Venous ulcer ([address] and Wound, open.      >>For CHF and Comorbidity documentation on Education Time and Topics, please see Education Tab    Progressive Mobility Assessment:  What is this patient's Current Level of Mobility?: Ambulatory- with Assistance  How was this patient Mobilized today?: Up to Chair,  Up to Toilet/Shower, Up
Patient's EF (Ejection Fraction) is greater than 40%    Heart Failure Medications:  Diuretics[de-identified] Torsemide and Spironolactone    (One of the following REQUIRED for EF </= 40%/SYSTOLIC FAILURE but MAY be used in EF% >40%/DIASTOLIC FAILURE)        ACE[de-identified] None        ARB[de-identified] Other         ARNI[de-identified] None    (Beta Blockers)  NON- Evidenced Based Beta Blocker (for EF% >40%/DIASTOLIC FAILURE): None    Evidenced Based Beta Blocker::(REQUIRED for EF% <40%/SYSTOLIC FAILURE) Carvedilol- Coreg  . .................................................................................................................................................. Healthy Weight Tracking - BMI + Meds 2/22/2023 2/23/2023 5/20/2023 5/21/2023 5/22/2023 5/23/2023 5/24/2023   Weight 230 lb 11.2 oz 231 lb 8 oz 256 lb 4.8 oz 252 lb 4.8 oz 251 lb 249 lb 12.8 oz 250 lb 11.2 oz   Height - - - 5' 5\" - - -   Body Mass Index - - - - 41.77 kg/m2 41.57 kg/m2 41.72 kg/m2   Some recent data might be hidden         Patient's weights and intake/output reviewed: Yes    Daily Weight log at bedside and being used: \"yes    Patient's Last Weight: 250.7 lbs obtained by standing scale. Difference of 0.9 lbs more than last documented weight.       Intake/Output Summary (Last 24 hours) at 5/24/2023 0557  Last data filed at 5/24/2023 0549  Gross per 24 hour   Intake 1510 ml   Output 0 ml   Net 1510 ml       Education Booklet Provided: yes    Comorbidities Reviewed Yes    Patient has a past medical history of Anemia, Angina, Arthritis, CAD (coronary artery disease), CHF (congestive heart failure) ([facility] CKD (chronic kidney disease) stage 3, GFR 30-59 ml/min ([facility] Clostridium difficile diarrhea, Diabetes mellitus ([facility] Di[facility] Disease of blood and blood forming organ, Dizziness, GERD (gastroesophageal reflux disease), Headache, Hearing loss, Hyperlipidemia, Hypertension, Kidney stone, Refusal of blood transfusions as patient is Evangelical, Sleep apnea,
Problem: Chronic Conditions and Co-morbidities  Goal: Patient's chronic conditions and co-morbidity symptoms are monitored and maintained or improved  Outcome: Progressing       Patient's EF (Ejection Fraction) is greater than 40%    Heart Failure Medications:  Diuretics[de-identified] Furosemide    (One of the following REQUIRED for EF </= 40%/SYSTOLIC FAILURE but MAY be used in EF% >40%/DIASTOLIC FAILURE)        ACE[de-identified] None        ARB[de-identified] None         ARNI[de-identified] None    (Beta Blockers)  NON- Evidenced Based Beta Blocker (for EF% >40%/DIASTOLIC FAILURE): none    Evidenced Based Beta Blocker::(REQUIRED for EF% <40%/SYSTOLIC FAILURE) Carvedilol- Coreg  . .................................................................................................................................................. Patient's weights and intake/output reviewed: Yes    Patient's Last Weight: 252 lbs obtained by standing scale. Intake/Output Summary (Last 24 hours) at 5/21/2023 2333  Last data filed at 5/21/2023 2037  Gross per 24 hour   Intake 480 ml   Output 2100 ml   Net -1620 ml       Daily Weight log at bedside and being used: [place] Provided: yes    Comorbidities Reviewed Yes    Patient has a past medical history of Anemia, Angina, Arthritis, CAD (coronary artery disease), CHF (congestive heart failure) ([address] CKD (chronic kidney disease) stage 3, GFR 30-59 ml/min ([address] Clostridium difficile diarrhea, Diabetes mellitus ([address] Diabetic neuropathy ([address] Disease of blood and blood forming organ, Dizziness, GERD (gastroesophageal reflux disease), Headache, Hearing loss, Hyperlipidemia, Hypertension, Kidney stone, Refusal of blood transfusions as patient is Christianity, Sleep apnea, Tinnitus, Venous ulcer ([facility] and Wound, open.      >>For CHF and Comorbidity documentation on Education Time and Topics, please see Education Tab    Progressive Mobility Assessment:  What is this patient's Current Level of Mobility?:
Problem: Discharge Planning  Goal: Discharge to home or other facility with appropriate resources  5/21/2023 2038 by Wellington Morales RN  Outcome: Progressing
Problem: Infection - Adult  Goal: Absence of infection at discharge  Outcome: Progressing  Goal: Absence of infection during hospitalization  Outcome: Progressing  Goal: Absence of fever/infection during anticipated neutropenic period  Outcome: Progressing

## 2023-05-24 NOTE — DISCHARGE SUMMARY
Hospital Medicine Discharge Summary    Patient: Kevin Doan   : 1955     Admit Date: 2023   Discharge Date: 2023    Disposition:  []Home   []HHC  []SNF  []Acute Rehab  []LTAC  []Hospice  Code status:  []Full  []DNR/CCA  []Limited (DNR/CCA with Do Not Intubate)  []DNRCC  Condition at Discharge: Stable  Primary Care Provider: Sybil Killian MD    Admitting Physician: No admitting provider for patient encounter. Discharge Physician: Miguel Cazares MD     Discharge Diagnoses: Active Hospital Problems    Diagnosis     Acute on chronic respiratory failure with hypoxemia ([facility]) [J96.21]      Priority: Medium    Acute diastolic HF (heart failure) ([facility]) [I50.31]      Priority: Medium    Anemia due to stage 3b chronic kidney disease ([facility]) [W06.91, D63.1]     Acute on chronic diastolic (congestive) heart failure ([facility] [I50.33]        Presenting Admission History:      70 y.o. male who presented to [facility] with weight gain and SOB under the advisement of his cardiology team.  PMHx significant for diastolic CHF, DM2, CKD III, peripheral neuropathy. The patient lives at [facility]. He has been having trouble with weight gain and SOB. He reportedly gained 13 pounds in 8 days, despite increasing his torsemide from 50 mg to 100 mg daily. His Cr has also been going up as well and he saw Dr. Binu Gonzales in office on . He was referred for a possible paracentesis to help with fluid removal on  at [facility], but reportedly there was not enough fluid to tap. Over the last few days, his weight continues to go up and he complains of dyspnea on exertion, noting his RA sats drop to 88% with minimal activity. He denies fever, chills, chest pain, abdominal pain, nausea, and vomiting. Assessment/Plan:      Acute on chronic diastolic CHF - he reports his weight is up 40 pounds over the past several weeks.     - last TTE  with grade III DD.  - initially held home torsemide   - Received

## 2023-05-24 NOTE — PROGRESS NOTES
[address]   Daily Progress Note    Admit Date:  5/19/2023  HPI:    Chief Complaint   Patient presents with    Shortness of Breath     Per EMS c/o SOB x 1 week. States patient needs lasix. Home O2 3L        Interval history: Gurpreet Patel is being followed for CHF. Known patient to me from the heart failure clinic     Subjective:  Mr. Ashleigh Arias comfortably on his side. Has been ambulating in his room with walker.      Objective:   /68   Pulse 93   Temp 97.8 °F (36.6 °C)   Resp 18   Ht 5' 5\" (1.651 m)   Wt 250 lb 11.2 oz (113.7 kg)   SpO2 97%   BMI 41.72 kg/m²     Intake/Output Summary (Last 24 hours) at 5/24/2023 1424  Last data filed at 5/24/2023 1240  Gross per 24 hour   Intake 1630 ml   Output 0 ml   Net 1630 ml       NYHA: IV    Physical Exam:  General:  sleeping soundly   Extremities:  ++ bilateral lower extremity edema- legs wrapped    Medications:    torsemide  100 mg Oral Daily    enoxaparin  30 mg SubCUTAneous BID    insulin lispro  0-16 Units SubCUTAneous TID WC    insulin lispro  0-4 Units SubCUTAneous Nightly    aspirin  81 mg Oral Daily    brimonidine-timolol  1 drop Left Eye Q12H    allopurinol  300 mg Oral Daily    ascorbic acid  500 mg Oral Daily    atorvastatin  80 mg Oral Nightly    carvedilol  3.125 mg Oral BID WC    docusate sodium  100 mg Oral BID    dorzolamide  1 drop Left Eye BID    ferrous sulfate  325 mg Oral BID    guaiFENesin  600 mg Oral BID    insulin glargine  40 Units SubCUTAneous Nightly    linaclotide  290 mcg Oral QAM AC    naloxegol  25 mg Oral QAM AC    pantoprazole  40 mg Oral Daily    pregabalin  50 mg Oral Q12H    Netarsudil Dimesylate  1 drop Left Eye Nightly    senna  1 tablet Oral BID    spironolactone  25 mg Oral Daily    tamsulosin  0.8 mg Oral Daily    sodium chloride flush  5-40 mL IntraVENous 2 times per day      sodium chloride      dextrose         Lab Data:  CBC:   No results for input(s): WBC, HGB, PLT in the last 72
[address]   Daily Progress Note    Admit Date:  5/19/2023  HPI:    Chief Complaint   Patient presents with    Shortness of Breath     Per EMS c/o SOB x 1 week. States patient needs lasix. Home O2 3L        Interval history: Mariela Hawk is being followed for CHF. Known patient to me from the heart failure clinic     Subjective:  Mr. Madiha Rae felt better yesterday . Continues with edema.    Abd ultrasound didn't show any significant ascites     Objective:   /66   Pulse 93   Temp 98.3 °F (36.8 °C) (Oral)   Resp 18   Ht 5' 5\" (1.651 m)   Wt 249 lb 12.8 oz (113.3 kg)   SpO2 97%   BMI 41.57 kg/m²     Intake/Output Summary (Last 24 hours) at 5/23/2023 0858  Last data filed at 5/23/2023 0813  Gross per 24 hour   Intake 1980 ml   Output 550 ml   Net 1430 ml       NYHA: IV    Physical Exam:  General:  Awake, alert, NAD, able to sit up in the chair   Skin:  Warm and dry  Neck:  JVD elevated   Chest:  Clear to auscultation, no wheezes/rhonchi/rales  Cardiovascular:  RRR S1S2, + murmur/ no r/g   Abdomen:  Soft, nontender, +bowel sounds  Extremities:  ++ bilateral lower extremity edema- legs wrapped    Medications:    insulin lispro  0-16 Units SubCUTAneous TID WC    insulin lispro  0-4 Units SubCUTAneous Nightly    aspirin  81 mg Oral Daily    brimonidine-timolol  1 drop Left Eye Q12H    allopurinol  300 mg Oral Daily    ascorbic acid  500 mg Oral Daily    atorvastatin  80 mg Oral Nightly    carvedilol  3.125 mg Oral BID WC    docusate sodium  100 mg Oral BID    dorzolamide  1 drop Left Eye BID    ferrous sulfate  325 mg Oral BID    guaiFENesin  600 mg Oral BID    insulin glargine  40 Units SubCUTAneous Nightly    linaclotide  290 mcg Oral QAM AC    naloxegol  25 mg Oral QAM AC    pantoprazole  40 mg Oral Daily    pregabalin  50 mg Oral Q12H    Netarsudil Dimesylate  1 drop Left Eye Nightly    senna  1 tablet Oral BID    spironolactone  25 mg Oral Daily    tamsulosin  0.8 mg Oral Daily    sodium
Hospital Medicine Progress Note      Date of Admission: 5/19/2023  Hospital Day: 4    Chief Admission Complaint: weight gain, SOB     Subjective:  resting in bed, sob improved, on 2L NC    Presenting Admission History:       70 y.o. male who presented to [facility] with weight gain and SOB under the advisement of his cardiology team.  PMHx significant for diastolic CHF, DM2, CKD III, peripheral neuropathy. The patient lives at [facility]. He has been having trouble with weight gain and SOB. He reportedly gained 13 pounds in 8 days, despite increasing his torsemide from 50 mg to 100 mg daily. His Cr has also been going up as well and he saw Dr. Emigdio Degroot in office on 4/25. He was referred for a possible paracentesis to help with fluid removal on 5/2 at [facility], but reportedly there was not enough fluid to tap. Over the last few days, his weight continues to go up and he complains of dyspnea on exertion, noting his RA sats drop to 88% with minimal activity. He denies fever, chills, chest pain, abdominal pain, nausea, and vomiting. Assessment/Plan:      Current Principal Problem:  Acute clinical systolic heart failure (HCC)    Acute clinical systolic heart failure [facility])     This 58-year-old male admitted with acute on chronic diastolic CHF continued with IV Lasix, Aldactone checked 2D ech,o continue with beta-blocker, cardiology consult appreciated. Acute on chronic kidney disease nephrology consulted, appreciated and following, continued with Lasix ,followed with daily BMP. Diabetes mellitus type 2 continue with current care, hemoglobin A1c= 8.0 on 2/20/2023. Diabetic neuropathy on Lyrica. Hypertension- on antihypertensive medication Coreg. Acute hypoxic respiratory- due to CHF exacerbation continue with supplemental oxygen weaned as tolerated.      Distended abdomen -paracentesis ultrasound ordered 5/22 to see amt of ascites       Physical Exam Performed:      General appearance:
Hospital Medicine Progress Note      Date of Admission: 5/19/2023  Hospital Day: 5    Chief Admission Complaint: weight gain, SOB     Subjective:  resting in bed, sob improved, on 2L NC    Presenting Admission History:       70 y.o. male who presented to [facility] with weight gain and SOB under the advisement of his cardiology team.  PMHx significant for diastolic CHF, DM2, CKD III, peripheral neuropathy. The patient lives at [facility]. He has been having trouble with weight gain and SOB. He reportedly gained 13 pounds in 8 days, despite increasing his torsemide from 50 mg to 100 mg daily. His Cr has also been going up as well and he saw Dr. Pily Chanel in office on 4/25. He was referred for a possible paracentesis to help with fluid removal on 5/2 at [facility], but reportedly there was not enough fluid to tap. Over the last few days, his weight continues to go up and he complains of dyspnea on exertion, noting his RA sats drop to 88% with minimal activity. He denies fever, chills, chest pain, abdominal pain, nausea, and vomiting. Assessment/Plan:      Current Principal Problem:  Acute clinical systolic heart failure (HCC)    Acute clinical systolic heart failure [facility])     This 65-year-old male admitted with acute on chronic diastolic CHF continued with IV Lasix, Aldactone checked 2D ech,o continue with beta-blocker, cardiology consult appreciated. - abd u/s done, no ascites noted    Acute on chronic kidney disease nephrology consulted, appreciated and following, continued with Lasix ,followed with daily BMP. Diabetes mellitus type 2 continue with current care, hemoglobin A1c= 8.0 on 2/20/2023. Diabetic neuropathy on Lyrica. Hypertension- on antihypertensive medication Coreg. Acute hypoxic respiratory- due to CHF exacerbation continue with supplemental oxygen weaned as tolerated.      Distended abdomen -paracentesis ultrasound ordered 5/22 to see amt of ascites       Physical Exam
Nephrology Progress Note   [email or web address]      Sub/interval history  Resting  Feels better     Last 24 h uop not all charted   Admission wt 256 lbs  Wt 251--> 249 lbs    ROS: No chest pain/shortness of breath/fever/nausea/vomiting  PSFH: No visitor    Scheduled Meds:   torsemide  100 mg Oral Daily    [START ON 5/24/2023] enoxaparin  30 mg SubCUTAneous BID    insulin lispro  0-16 Units SubCUTAneous TID WC    insulin lispro  0-4 Units SubCUTAneous Nightly    aspirin  81 mg Oral Daily    brimonidine-timolol  1 drop Left Eye Q12H    allopurinol  300 mg Oral Daily    ascorbic acid  500 mg Oral Daily    atorvastatin  80 mg Oral Nightly    carvedilol  3.125 mg Oral BID WC    docusate sodium  100 mg Oral BID    dorzolamide  1 drop Left Eye BID    ferrous sulfate  325 mg Oral BID    guaiFENesin  600 mg Oral BID    insulin glargine  40 Units SubCUTAneous Nightly    linaclotide  290 mcg Oral QAM AC    naloxegol  25 mg Oral QAM AC    pantoprazole  40 mg Oral Daily    pregabalin  50 mg Oral Q12H    Netarsudil Dimesylate  1 drop Left Eye Nightly    senna  1 tablet Oral BID    spironolactone  25 mg Oral Daily    tamsulosin  0.8 mg Oral Daily    sodium chloride flush  5-40 mL IntraVENous 2 times per day     Continuous Infusions:   sodium chloride      dextrose       PRN Meds:.oxyCODONE-acetaminophen, carboxymethylcellulose PF, sodium chloride flush, sodium chloride, ondansetron **OR** ondansetron, polyethylene glycol, acetaminophen **OR** acetaminophen, glucose, dextrose bolus **OR** dextrose bolus, glucagon (rDNA), dextrose    Objective/     Vitals:    05/23/23 0440 05/23/23 0510 05/23/23 0813 05/23/23 1153   BP: 112/71  109/66 121/71   Pulse: 93  93 92   Resp: 18 16 18 18   Temp: 98.4 °F (36.9 °C)  98.3 °F (36.8 °C) 97.8 °F (36.6 °C)   TempSrc: Oral  Oral Oral   SpO2: 98%  97% 98%   Weight:       Height:         24HR INTAKE/OUTPUT:    Intake/Output Summary (Last 24 hours) at 5/23/2023 1432  Last data filed at 5/23/2023
Nephrology Progress Note   [facility]      Sub/interval history  Resting  Feels better     Last 24 h uop not all charted   Admission wt 256 lbs  Wt 251--> 249-->250 lbs    ROS: No chest pain/shortness of breath/fever/nausea/vomiting  PSFH: No visitor    Scheduled Meds:   torsemide  100 mg Oral Daily    enoxaparin  30 mg SubCUTAneous BID    insulin lispro  0-16 Units SubCUTAneous TID WC    insulin lispro  0-4 Units SubCUTAneous Nightly    aspirin  81 mg Oral Daily    brimonidine-timolol  1 drop Left Eye Q12H    allopurinol  300 mg Oral Daily    ascorbic acid  500 mg Oral Daily    atorvastatin  80 mg Oral Nightly    carvedilol  3.125 mg Oral BID WC    docusate sodium  100 mg Oral BID    dorzolamide  1 drop Left Eye BID    ferrous sulfate  325 mg Oral BID    guaiFENesin  600 mg Oral BID    insulin glargine  40 Units SubCUTAneous Nightly    linaclotide  290 mcg Oral QAM AC    naloxegol  25 mg Oral QAM AC    pantoprazole  40 mg Oral Daily    pregabalin  50 mg Oral Q12H    Netarsudil Dimesylate  1 drop Left Eye Nightly    senna  1 tablet Oral BID    spironolactone  25 mg Oral Daily    tamsulosin  0.8 mg Oral Daily    sodium chloride flush  5-40 mL IntraVENous 2 times per day     Continuous Infusions:   sodium chloride      dextrose       PRN Meds:.oxyCODONE-acetaminophen, carboxymethylcellulose PF, sodium chloride flush, sodium chloride, ondansetron **OR** ondansetron, polyethylene glycol, acetaminophen **OR** acetaminophen, glucose, dextrose bolus **OR** dextrose bolus, glucagon (rDNA), dextrose    Objective/     Vitals:    05/24/23 0022 05/24/23 0023 05/24/23 0408 05/24/23 0945   BP: 116/69  (!) 97/59 121/70   Pulse: 95  93 94   Resp: 18  18 18   Temp:   97.8 °F (36.6 °C) 98.2 °F (36.8 °C)   TempSrc:   Oral Oral   SpO2: (!) 85% 94% 96% 98%   Weight:   250 lb 11.2 oz (113.7 kg)    Height:         24HR INTAKE/OUTPUT:    Intake/Output Summary (Last 24 hours) at 5/24/2023 1118  Last data filed at 5/24/2023
Nephrology Progress Note   [email or web address]      Sub/interval history  Resting  Feels better but c/o abd  swelling    Last 24 h uop 1400 ml  Admission wt 256 lbs  Wt 251 lbs on 5/22    ROS: No chest pain/shortness of breath/fever/nausea/vomiting  PSFH: No visitor    Scheduled Meds:   brimonidine-timolol  1 drop Left Eye Q12H    allopurinol  300 mg Oral Daily    ascorbic acid  500 mg Oral Daily    atorvastatin  80 mg Oral Nightly    carvedilol  3.125 mg Oral BID WC    docusate sodium  100 mg Oral BID    dorzolamide  1 drop Left Eye BID    ferrous sulfate  325 mg Oral BID    guaiFENesin  600 mg Oral BID    insulin glargine  40 Units SubCUTAneous Nightly    linaclotide  290 mcg Oral QAM AC    naloxegol  25 mg Oral QAM AC    pantoprazole  40 mg Oral Daily    pregabalin  50 mg Oral Q12H    Netarsudil Dimesylate  1 drop Left Eye Nightly    senna  1 tablet Oral BID    spironolactone  25 mg Oral Daily    tamsulosin  0.8 mg Oral Daily    sodium chloride flush  5-40 mL IntraVENous 2 times per day    enoxaparin  40 mg SubCUTAneous Daily    furosemide  40 mg IntraVENous BID    insulin lispro  0-8 Units SubCUTAneous TID WC    insulin lispro  0-4 Units SubCUTAneous Nightly     Continuous Infusions:   sodium chloride      dextrose       PRN Meds:.oxyCODONE-acetaminophen, carboxymethylcellulose PF, sodium chloride flush, sodium chloride, ondansetron **OR** ondansetron, polyethylene glycol, acetaminophen **OR** acetaminophen, glucose, dextrose bolus **OR** dextrose bolus, glucagon (rDNA), dextrose    Objective/     Vitals:    05/22/23 0407 05/22/23 0724 05/22/23 0745 05/22/23 1045   BP: 126/69  122/74 (!) 144/74   Pulse: 95  92 94   Resp: 16 16 16 16   Temp: 97.6 °F (36.4 °C)  98.3 °F (36.8 °C) 97.6 °F (36.4 °C)   TempSrc: Oral  Oral Oral   SpO2: 93%  96% 94%   Weight:       Height:         24HR INTAKE/OUTPUT:    Intake/Output Summary (Last 24 hours) at 5/22/2023 1100  Last data filed at 5/22/2023 0815  Gross per 24 hour
Occupational Therapy    Chart reviewed, attempted to see pt for OT eval this date;  pt up in chair, per pt & nursing staff, no OT needs identified at this time.     Mike Sees
Physical Therapy  Facility/Department: [facility]  Daily Treatment Note  NAME: Kevin Doan  : 1955  MRN: [id number]    Date of Service: 2023    Discharge Recommendations:  [address] with PT   PT Equipment Recommendations  Equipment Needed: No  Other: defer to next level of care. Patient Diagnosis(es): The primary encounter diagnosis was Acute on chronic congestive heart failure, unspecified heart failure type ([facility] [address] Diagnoses of Acute kidney injury superimposed on CKD ([facility] [address] and Acute on chronic respiratory failure with hypoxemia [facility]) were also pertinent to this visit. Assessment   Assessment: Patient seen for gait and LE strengthening. Patient cleared by RN for therapy participation this date. Patient agreeable to therapy. Patient completed mobility with supervision and RW. Pt tolerates up to 100 ft of ambulation, VSS. Pt completes seated exercises in recliner and returned to bed at end of session per request.  P.T .will continue to follow throughout LOS. Recommending return to [facility] with PT. Activity Tolerance: Patient tolerated evaluation without incident;Patient tolerated treatment well  Equipment Needed: No  Other: defer to next level of care. Plan    Physcial Therapy Plan  General Plan: 3-5 times per week  Current Treatment Recommendations: Strengthening;Balance training;Functional mobility training;Transfer training; Endurance training;Gait training;Stair training;Neuromuscular re-education;Home exercise program;Safety education & training;Patient/Caregiver education & training;Equipment evaluation, education, & procurement; Therapeutic activities     Restrictions  Restrictions/Precautions  Restrictions/Precautions: General Precautions  Position Activity Restriction  Other position/activity restrictions: on nasal cannula,     Subjective    Subjective  Subjective: pt in bed, agreeable to therapy  Pain: Pt reporting chronic pain 8/10, generalized  Orientation  Overall
Nightly      sodium chloride      dextrose         Lab Data:  CBC:   Recent Labs     05/19/23  1918   WBC 8.3   HGB 11.9*        BMP:    Recent Labs     05/20/23  0544 05/21/23  0548 05/22/23  0505   * 132* 130*   K 3.7 4.2 4.0   CO2 28 30 29   BUN 98* 92* 88*   CREATININE 1.6* 1.8* 1.5*     INR:  No results for input(s): INR in the last 72 hours. BNP:    Recent Labs     05/19/23 2010 05/22/23  0505   PROBNP 5,430* 4,219*     Lab Results   Component Value Date    LVEF 53 08/26/2022       Testing:    Stress MPI 3/8/22   Abnormal moderate risk myocardial perfusion study. There is a large area of mixed ischemia and scar within the infero-apical,  apical-lateral, lateral, posterior-lateral, and posterior-basal segments. The left ventricular size is moderately dilated. The estimated left ventricular function is 67%. Cath 2012  RCA 90%; LM ok.; %; LCX 40%; SVG to D1 patent; SVG to RPL patent; LIMA to LAD patent; LVEF 60%  Pulmonary Venous hypertension; PA 73/27, PCWP 30, RA 21, CO 6.1 L/min      ECHO 8/24/22   Summary   Limited visualization due to body habitus. Definity® used for myocardial border enhancement. Left ventricular systolic function is low normal with a visually estimated ejection fraction of 50-55%. The left ventricle is normal in size with mild concentric hypertrophy. The interventricular septum during systole and diastole is flattened. This is consistent with right ventricular pressure and volume overload. Grade III diastolic dysfunction with elevated LV pressure. There is no evidence of a left ventricular thrombus. The right ventricle is mildly dilated. Right ventricular systolic function is reduced. The left atrium appears severely enlarged. The right atrium is mildly enlarged. The aortic valve appears functionally bicuspid with severely calcified and  fused left and non-coronary cusps. Moderate aortic stenosis and regurgitation.    Mild mitral,

## 2023-05-24 NOTE — DISCHARGE INSTR - COC
Continuity of Care Form    Patient Name: Karon Resendiz   :  1955  MRN:  [id number]    Admit date:  2023  Discharge date:  ***    Code Status Order: Limited   Advance Directives:     Admitting Physician:  No admitting provider for patient encounter. PCP: Aminata Bee MD    Discharging Nurse: [facility] Unit/Room#: 9561/1560-64  Discharging Unit Phone Number: ***    Emergency Contact:   Extended Emergency Contact Information  Primary Emergency Contact: [address] of [address] [place] [address] Phone: [phone]  Mobile Phone: [phone]  Relation: Other   needed?  No  Secondary Emergency Contact: [address] Phone: [phone]  Mobile Phone: [phone]  Relation: Other    Past Surgical History:  Past Surgical History:   Procedure Laterality Date    CARDIAC SURGERY      Dec 27 2010, triple bypass    CHOLECYSTECTOMY  2011    HERNIA REPAIR  age2    KNEE ARTHROCENTESIS Right 10/6/2022    RIGHT SHOULDER INTRA ARTICULAR INJECTION SITE CONFIRMED BY FLUOROSCOPY performed by Chrissie Desir MD at [facility] EG OR    OTHER SURGICAL HISTORY  10 REPAIR LOWER STERNAL INCISION, REMOVAL OF ONE STERNAL WIRE,    OTHER SURGICAL HISTORY  10/10/2014    phacoemulsification of cataract with intraocular lens implant left eye    PAIN MANAGEMENT PROCEDURE N/A 2/10/2022    MIDLINE CERVICAL SIX SEVEN EPIDURAL STEROID INJECTION SITE CONFIRMED BY FLUOROSCOPY performed by Chrissie Desir MD at [place] [address] N/A 2022    MIDLINE TO RIGHT CERVICAL SIX SEVEN EPIDURAL STEROID INJECTION SITE CONFIRMED BY FLUOROSCOPY performed by Chrissie Desir MD at [address] [place] [address] ENDOSCOPY         Immunization History:   Immunization History   Administered Date(s) Administered    COVID-19, PFIZER PURPLE top, DILUTE for use, (age 15 y+), 30mcg/0.3mL 2021, 2021    Influenza Virus Vaccine 2012, 10/02/2017, 10/01/2018,

## 2023-05-24 NOTE — CARE COORDINATION
CASE MANAGEMENT DISCHARGE SUMMARY      Discharge to: [facility]         IMM given: (date) 5/24/23         Transportation:        Medical Transport explained to [employer]. Pt/family voice no agency preference. Agency used: [address] [place] [address] Place up time: 18:30   Ambulance form completed: Yes    Confirmed discharge plan with:     Patient: yes      Family:   no- pt to call     Facility/Agency, name:  [facility]   Phone number for report to facility: [phone] [address]     RN, name: Ben Fuentes    Note: Discharging nurse to complete ARASH, reconcile AVS, and place final copy with patient's discharge packet. RN to ensure that written prescriptions for  Level II medications are sent with patient to the facility as per protocol.

## 2023-06-20 ENCOUNTER — TELEPHONE (OUTPATIENT)
Dept: CARDIOLOGY CLINIC | Age: 68
End: 2023-06-20

## 2023-06-20 NOTE — TELEPHONE ENCOUNTER
Spoke with pt, he states he came from kidney doctor today, diuretic is only as needed when weight goes up. Torsemide 100 mg and metolazone 5 mg both as needed with weight gain. Will send to [facility] as an FYI.

## 2023-06-20 NOTE — TELEPHONE ENCOUNTER
Ying Marcos RN calling into to report weight gain of 5-6 pounds in 1 week. Reports weight 242. 2 lbs today. SOB baseline wears oxygen as needed. No other associated symptoms. 06/15 BUN 89 Creatnine 1.5. 06/08 , Creatinine 1.6. Torsemide decreased from 100 mg to 50 mg daily on 06/15/23 per cardiology. Metolazone was discontinued on 06/15/23. Patient currently at nephrology appointment potentially discussing dialysis.      Call back [facility] director nursing [phone]

## 2023-06-20 NOTE — TELEPHONE ENCOUNTER
I don't want to cause any confusion in the dosing of his diuretics. Last notes were torsemide 100 mg daily with metolazone prn for weight gain. Will defer diuretic dosing to nephrology based on their visit today. Have SIGRID Eddy go by Dr. Yamileth Bobby recommendations.    John Corado, APRN - CNP

## 2023-06-20 NOTE — TELEPHONE ENCOUNTER
[address] called back to see if they could get standing orders for medication dosing. NPDD message gave. V/u. Will f/u with nephrology.

## 2023-06-22 LAB
B-TYPE NATRIURETIC PEPTIDE: 183.8 PG/ML
BASOPHILS ABSOLUTE: ABNORMAL
BASOPHILS RELATIVE PERCENT: 0.4 %
BUN BLDV-MCNC: 68 MG/DL
CALCIUM SERPL-MCNC: 9.3 MG/DL
CHLORIDE BLD-SCNC: 101 MMOL/L
CO2: 26 MMOL/L
CREAT SERPL-MCNC: 1.3 MG/DL
EGFR: NORMAL
EOSINOPHILS ABSOLUTE: 0.2 /ΜL
EOSINOPHILS RELATIVE PERCENT: 3.6 %
GLUCOSE BLD-MCNC: 255 MG/DL
HCT VFR BLD CALC: 37.9 % (ref 41–53)
HEMOGLOBIN: 12.3 G/DL (ref 13.5–17.5)
LYMPHOCYTES ABSOLUTE: 0.9 /ΜL
LYMPHOCYTES RELATIVE PERCENT: 16.8 %
MCH RBC QN AUTO: 27 PG
MCHC RBC AUTO-ENTMCNC: 32.4 G/DL
MCV RBC AUTO: 83.2 FL
MONOCYTES ABSOLUTE: 0.4 /ΜL
MONOCYTES RELATIVE PERCENT: 7.1 %
NEUTROPHILS ABSOLUTE: 3.9 /ΜL
NEUTROPHILS RELATIVE PERCENT: 72.1 %
PLATELET # BLD: 134 K/ΜL
PMV BLD AUTO: 8.7 FL
POTASSIUM SERPL-SCNC: 4.5 MMOL/L
RBC # BLD: 4.56 10^6/ΜL
SODIUM BLD-SCNC: 134 MMOL/L
WBC # BLD: 5.4 10^3/ML

## 2023-07-27 ENCOUNTER — CLINICAL DOCUMENTATION (OUTPATIENT)
Dept: CASE MANAGEMENT | Age: 68
End: 2023-07-27

## 2023-07-27 LAB
B-TYPE NATRIURETIC PEPTIDE: 138.7 PG/ML
BUN BLDV-MCNC: 85 MG/DL
CALCIUM SERPL-MCNC: 9.4 MG/DL
CHLORIDE BLD-SCNC: 98 MMOL/L
CO2: 27 MMOL/L
CREAT SERPL-MCNC: 1.7 MG/DL
EGFR: 40
GLUCOSE BLD-MCNC: 315 MG/DL
POTASSIUM SERPL-SCNC: 4 MMOL/L
SODIUM BLD-SCNC: 136 MMOL/L

## 2023-07-27 NOTE — MANAGEMENT PLAN
Patient Management Plan              Pt. Name: Lui Kc  : 1955           Date plan entered: 22        Patients Physicians:     Primary Care  : No primary care provider on file. Contact #:    Specialists:    Contact #:                                                   Summary        Reason for Referral: This patient has been provided a management plan for Medical Needs                 Patient has had frequent visits for:  Taoism  One admission a month this year. Always brings up an issue with a garcia being inserted when he refused. Noncompliant with CHF, diet, etc.  Currently at [facility] [place] [address] [address]     22  ED COURSE/MDM  Patient seen and evaluated. Old records reviewed. Labs and imaging reviewed and results discussed with patient. 59-year-old male presenting with signs and symptoms concerning for CHF exacerbation, volume overload. Blood pressure in the 104 systolics, SPO2 in the low to mid 90s. He has exam that is consistent with volume overload. Chest x-ray with pulmonary edema. Creatinine is stable at 1.9 however he is new uremia to 113. No symptoms of GI bleed. I spoke with on-call nephrology, Dr. Rakesh Padgett, who recommended Lasix infusion as this has previously worked well for the patient. Patient expressed frustration to nursing staff that his hospital bed was not immediately available. He states that he has chronic back pain and if he is not able to get to his hospital room soon, he wants to go back to his nursing facility. Myself and nursing staff explained to the patient that the hospital is full and we are holding patients in the emergency department. He does have elevated troponin, exam consistent with volume overload and his symptoms were only improved with Lasix infusion and he definitely requires inpatient hospitalization. Patient however does not want to listen to this and continues to want to leave [address] [place] [address].      I have

## 2023-08-10 ENCOUNTER — TELEPHONE (OUTPATIENT)
Dept: CARDIOLOGY CLINIC | Age: 68
End: 2023-08-10

## 2023-08-10 NOTE — TELEPHONE ENCOUNTER
NH sts pt weight yesterday was 248.2 and today 251.00  NH got critical results on BUN of 157  Please advise.

## 2023-08-11 ENCOUNTER — APPOINTMENT (OUTPATIENT)
Dept: GENERAL RADIOLOGY | Age: 68
End: 2023-08-11
Payer: MEDICARE

## 2023-08-11 ENCOUNTER — HOSPITAL ENCOUNTER (INPATIENT)
Age: 68
LOS: 6 days | Discharge: NURSING, REHAB OR LONG-TERM CARE | End: 2023-08-17
Attending: EMERGENCY MEDICINE | Admitting: INTERNAL MEDICINE
Payer: MEDICARE

## 2023-08-11 DIAGNOSIS — N18.9 ACUTE RENAL FAILURE SUPERIMPOSED ON CHRONIC KIDNEY DISEASE, UNSPECIFIED CKD STAGE, UNSPECIFIED ACUTE RENAL FAILURE TYPE (HCC): Primary | ICD-10-CM

## 2023-08-11 DIAGNOSIS — E11.65 TYPE 2 DIABETES MELLITUS WITH HYPERGLYCEMIA, UNSPECIFIED WHETHER LONG TERM INSULIN USE (HCC): ICD-10-CM

## 2023-08-11 DIAGNOSIS — N17.9 ACUTE RENAL FAILURE SUPERIMPOSED ON CHRONIC KIDNEY DISEASE, UNSPECIFIED CKD STAGE, UNSPECIFIED ACUTE RENAL FAILURE TYPE (HCC): Primary | ICD-10-CM

## 2023-08-11 PROBLEM — E87.1 HYPONATREMIA: Status: ACTIVE | Noted: 2023-08-11

## 2023-08-11 PROBLEM — R79.89 AZOTEMIA: Status: ACTIVE | Noted: 2023-08-11

## 2023-08-11 LAB
ALBUMIN SERPL-MCNC: 3.8 G/DL (ref 3.4–5)
ALBUMIN/GLOB SERPL: 1 {RATIO} (ref 1.1–2.2)
ALP SERPL-CCNC: 125 U/L (ref 40–129)
ALT SERPL-CCNC: 11 U/L (ref 10–40)
ANION GAP SERPL CALCULATED.3IONS-SCNC: 10 MMOL/L (ref 3–16)
AST SERPL-CCNC: 13 U/L (ref 15–37)
BASOPHILS # BLD: 0 K/UL (ref 0–0.2)
BASOPHILS NFR BLD: 0.6 %
BILIRUB SERPL-MCNC: 0.6 MG/DL (ref 0–1)
BUN SERPL-MCNC: 133 MG/DL (ref 7–20)
CALCIUM SERPL-MCNC: 9.5 MG/DL (ref 8.3–10.6)
CHLORIDE SERPL-SCNC: 92 MMOL/L (ref 99–110)
CO2 SERPL-SCNC: 29 MMOL/L (ref 21–32)
CREAT SERPL-MCNC: 2.1 MG/DL (ref 0.8–1.3)
DEPRECATED RDW RBC AUTO: 19.4 % (ref 12.4–15.4)
EOSINOPHIL # BLD: 0.2 K/UL (ref 0–0.6)
EOSINOPHIL NFR BLD: 3.7 %
GFR SERPLBLD CREATININE-BSD FMLA CKD-EPI: 33 ML/MIN/{1.73_M2}
GLUCOSE BLD-MCNC: 381 MG/DL (ref 70–99)
GLUCOSE SERPL-MCNC: 410 MG/DL (ref 70–99)
HCT VFR BLD AUTO: 36.1 % (ref 40.5–52.5)
HGB BLD-MCNC: 11.8 G/DL (ref 13.5–17.5)
LYMPHOCYTES # BLD: 0.7 K/UL (ref 1–5.1)
LYMPHOCYTES NFR BLD: 10.9 %
MAGNESIUM SERPL-MCNC: 2.7 MG/DL (ref 1.8–2.4)
MCH RBC QN AUTO: 27.6 PG (ref 26–34)
MCHC RBC AUTO-ENTMCNC: 32.7 G/DL (ref 31–36)
MCV RBC AUTO: 84.4 FL (ref 80–100)
MONOCYTES # BLD: 0.5 K/UL (ref 0–1.3)
MONOCYTES NFR BLD: 7.2 %
NEUTROPHILS # BLD: 5.2 K/UL (ref 1.7–7.7)
NEUTROPHILS NFR BLD: 77.6 %
NT-PROBNP SERPL-MCNC: 2373 PG/ML (ref 0–124)
PERFORMED ON: ABNORMAL
PLATELET # BLD AUTO: 129 K/UL (ref 135–450)
PMV BLD AUTO: 9.5 FL (ref 5–10.5)
POTASSIUM SERPL-SCNC: 4.5 MMOL/L (ref 3.5–5.1)
PROT SERPL-MCNC: 7.5 G/DL (ref 6.4–8.2)
RBC # BLD AUTO: 4.28 M/UL (ref 4.2–5.9)
SODIUM SERPL-SCNC: 131 MMOL/L (ref 136–145)
TROPONIN, HIGH SENSITIVITY: 65 NG/L (ref 0–22)
TROPONIN, HIGH SENSITIVITY: 66 NG/L (ref 0–22)
WBC # BLD AUTO: 6.7 K/UL (ref 4–11)

## 2023-08-11 PROCEDURE — 71045 X-RAY EXAM CHEST 1 VIEW: CPT

## 2023-08-11 PROCEDURE — 83880 ASSAY OF NATRIURETIC PEPTIDE: CPT

## 2023-08-11 PROCEDURE — 93005 ELECTROCARDIOGRAM TRACING: CPT | Performed by: PHYSICIAN ASSISTANT

## 2023-08-11 PROCEDURE — 6370000000 HC RX 637 (ALT 250 FOR IP): Performed by: NURSE PRACTITIONER

## 2023-08-11 PROCEDURE — 84484 ASSAY OF TROPONIN QUANT: CPT

## 2023-08-11 PROCEDURE — 99285 EMERGENCY DEPT VISIT HI MDM: CPT

## 2023-08-11 PROCEDURE — 2580000003 HC RX 258: Performed by: NURSE PRACTITIONER

## 2023-08-11 PROCEDURE — 6360000002 HC RX W HCPCS: Performed by: NURSE PRACTITIONER

## 2023-08-11 PROCEDURE — 2580000003 HC RX 258: Performed by: PHYSICIAN ASSISTANT

## 2023-08-11 PROCEDURE — 2700000000 HC OXYGEN THERAPY PER DAY

## 2023-08-11 PROCEDURE — 85025 COMPLETE CBC W/AUTO DIFF WBC: CPT

## 2023-08-11 PROCEDURE — 1200000000 HC SEMI PRIVATE

## 2023-08-11 PROCEDURE — 83735 ASSAY OF MAGNESIUM: CPT

## 2023-08-11 PROCEDURE — 80053 COMPREHEN METABOLIC PANEL: CPT

## 2023-08-11 PROCEDURE — 94761 N-INVAS EAR/PLS OXIMETRY MLT: CPT

## 2023-08-11 RX ORDER — POLYVINYL ALCOHOL 14 MG/ML
1 SOLUTION/ DROPS OPHTHALMIC 4 TIMES DAILY PRN
Status: DISCONTINUED | OUTPATIENT
Start: 2023-08-11 | End: 2023-08-17 | Stop reason: HOSPADM

## 2023-08-11 RX ORDER — DEXTROSE MONOHYDRATE 100 MG/ML
INJECTION, SOLUTION INTRAVENOUS CONTINUOUS PRN
Status: DISCONTINUED | OUTPATIENT
Start: 2023-08-11 | End: 2023-08-17 | Stop reason: HOSPADM

## 2023-08-11 RX ORDER — INSULIN LISPRO 100 [IU]/ML
0-4 INJECTION, SOLUTION INTRAVENOUS; SUBCUTANEOUS NIGHTLY
Status: DISCONTINUED | OUTPATIENT
Start: 2023-08-11 | End: 2023-08-17 | Stop reason: HOSPADM

## 2023-08-11 RX ORDER — SODIUM CHLORIDE 9 MG/ML
INJECTION, SOLUTION INTRAVENOUS ONCE
Status: COMPLETED | OUTPATIENT
Start: 2023-08-11 | End: 2023-08-12

## 2023-08-11 RX ORDER — ONDANSETRON 2 MG/ML
4 INJECTION INTRAMUSCULAR; INTRAVENOUS EVERY 6 HOURS PRN
Status: DISCONTINUED | OUTPATIENT
Start: 2023-08-11 | End: 2023-08-17 | Stop reason: HOSPADM

## 2023-08-11 RX ORDER — INSULIN GLARGINE 100 [IU]/ML
40 INJECTION, SOLUTION SUBCUTANEOUS NIGHTLY
Status: DISCONTINUED | OUTPATIENT
Start: 2023-08-11 | End: 2023-08-17 | Stop reason: HOSPADM

## 2023-08-11 RX ORDER — POTASSIUM CHLORIDE 750 MG/1
10 TABLET, EXTENDED RELEASE ORAL DAILY
Status: DISCONTINUED | OUTPATIENT
Start: 2023-08-12 | End: 2023-08-17 | Stop reason: HOSPADM

## 2023-08-11 RX ORDER — GUAIFENESIN 600 MG/1
600 TABLET, EXTENDED RELEASE ORAL 2 TIMES DAILY
Status: DISCONTINUED | OUTPATIENT
Start: 2023-08-11 | End: 2023-08-17 | Stop reason: HOSPADM

## 2023-08-11 RX ORDER — BRIMONIDINE TARTRATE 2 MG/ML
1 SOLUTION/ DROPS OPHTHALMIC 2 TIMES DAILY
Status: DISCONTINUED | OUTPATIENT
Start: 2023-08-11 | End: 2023-08-12

## 2023-08-11 RX ORDER — ACETAMINOPHEN 325 MG/1
650 TABLET ORAL EVERY 6 HOURS PRN
Status: DISCONTINUED | OUTPATIENT
Start: 2023-08-11 | End: 2023-08-17 | Stop reason: HOSPADM

## 2023-08-11 RX ORDER — DORZOLAMIDE HCL 20 MG/ML
1 SOLUTION/ DROPS OPHTHALMIC 2 TIMES DAILY
Status: DISCONTINUED | OUTPATIENT
Start: 2023-08-11 | End: 2023-08-12

## 2023-08-11 RX ORDER — SODIUM CHLORIDE 0.9 % (FLUSH) 0.9 %
5-40 SYRINGE (ML) INJECTION EVERY 12 HOURS SCHEDULED
Status: DISCONTINUED | OUTPATIENT
Start: 2023-08-11 | End: 2023-08-17 | Stop reason: HOSPADM

## 2023-08-11 RX ORDER — 0.9 % SODIUM CHLORIDE 0.9 %
500 INTRAVENOUS SOLUTION INTRAVENOUS ONCE
Status: COMPLETED | OUTPATIENT
Start: 2023-08-11 | End: 2023-08-11

## 2023-08-11 RX ORDER — TAMSULOSIN HYDROCHLORIDE 0.4 MG/1
0.8 CAPSULE ORAL DAILY
Status: DISCONTINUED | OUTPATIENT
Start: 2023-08-12 | End: 2023-08-17 | Stop reason: HOSPADM

## 2023-08-11 RX ORDER — CYCLOBENZAPRINE HCL 10 MG
5 TABLET ORAL EVERY 8 HOURS PRN
Status: DISCONTINUED | OUTPATIENT
Start: 2023-08-11 | End: 2023-08-17 | Stop reason: HOSPADM

## 2023-08-11 RX ORDER — LANOLIN ALCOHOL/MO/W.PET/CERES
400 CREAM (GRAM) TOPICAL 2 TIMES DAILY
Status: DISCONTINUED | OUTPATIENT
Start: 2023-08-11 | End: 2023-08-17 | Stop reason: HOSPADM

## 2023-08-11 RX ORDER — SODIUM CHLORIDE 0.9 % (FLUSH) 0.9 %
5-40 SYRINGE (ML) INJECTION PRN
Status: DISCONTINUED | OUTPATIENT
Start: 2023-08-11 | End: 2023-08-17 | Stop reason: HOSPADM

## 2023-08-11 RX ORDER — FERROUS SULFATE 325(65) MG
325 TABLET ORAL 2 TIMES DAILY
Status: DISCONTINUED | OUTPATIENT
Start: 2023-08-11 | End: 2023-08-17 | Stop reason: HOSPADM

## 2023-08-11 RX ORDER — ASCORBIC ACID 500 MG
500 TABLET ORAL DAILY
Status: DISCONTINUED | OUTPATIENT
Start: 2023-08-12 | End: 2023-08-17 | Stop reason: HOSPADM

## 2023-08-11 RX ORDER — CARVEDILOL 3.12 MG/1
3.12 TABLET ORAL 2 TIMES DAILY WITH MEALS
Status: DISCONTINUED | OUTPATIENT
Start: 2023-08-12 | End: 2023-08-17 | Stop reason: HOSPADM

## 2023-08-11 RX ORDER — INSULIN LISPRO 100 [IU]/ML
0-16 INJECTION, SOLUTION INTRAVENOUS; SUBCUTANEOUS
Status: DISCONTINUED | OUTPATIENT
Start: 2023-08-12 | End: 2023-08-17 | Stop reason: HOSPADM

## 2023-08-11 RX ORDER — ENOXAPARIN SODIUM 100 MG/ML
30 INJECTION SUBCUTANEOUS 2 TIMES DAILY
Status: COMPLETED | OUTPATIENT
Start: 2023-08-11 | End: 2023-08-13

## 2023-08-11 RX ORDER — ASPIRIN 81 MG/1
81 TABLET ORAL DAILY
Status: DISCONTINUED | OUTPATIENT
Start: 2023-08-12 | End: 2023-08-17 | Stop reason: HOSPADM

## 2023-08-11 RX ORDER — DOCUSATE SODIUM 100 MG/1
100 CAPSULE, LIQUID FILLED ORAL 2 TIMES DAILY
Status: DISCONTINUED | OUTPATIENT
Start: 2023-08-11 | End: 2023-08-17 | Stop reason: HOSPADM

## 2023-08-11 RX ORDER — ALLOPURINOL 300 MG/1
300 TABLET ORAL DAILY
Status: DISCONTINUED | OUTPATIENT
Start: 2023-08-12 | End: 2023-08-17 | Stop reason: HOSPADM

## 2023-08-11 RX ORDER — BRIMONIDINE TARTRATE AND TIMOLOL MALEATE 2; 5 MG/ML; MG/ML
1 SOLUTION OPHTHALMIC 2 TIMES DAILY
Status: DISCONTINUED | OUTPATIENT
Start: 2023-08-11 | End: 2023-08-11 | Stop reason: SDUPTHER

## 2023-08-11 RX ORDER — TIMOLOL MALEATE 5 MG/ML
1 SOLUTION/ DROPS OPHTHALMIC 2 TIMES DAILY
Status: DISCONTINUED | OUTPATIENT
Start: 2023-08-11 | End: 2023-08-12

## 2023-08-11 RX ORDER — ONDANSETRON 4 MG/1
4 TABLET, ORALLY DISINTEGRATING ORAL EVERY 8 HOURS PRN
Status: DISCONTINUED | OUTPATIENT
Start: 2023-08-11 | End: 2023-08-17 | Stop reason: HOSPADM

## 2023-08-11 RX ORDER — ATORVASTATIN CALCIUM 80 MG/1
80 TABLET, FILM COATED ORAL NIGHTLY
Status: DISCONTINUED | OUTPATIENT
Start: 2023-08-11 | End: 2023-08-17 | Stop reason: HOSPADM

## 2023-08-11 RX ORDER — SODIUM CHLORIDE 9 MG/ML
INJECTION, SOLUTION INTRAVENOUS PRN
Status: DISCONTINUED | OUTPATIENT
Start: 2023-08-11 | End: 2023-08-17 | Stop reason: HOSPADM

## 2023-08-11 RX ORDER — PREGABALIN 25 MG/1
50 CAPSULE ORAL EVERY 12 HOURS
Status: DISCONTINUED | OUTPATIENT
Start: 2023-08-11 | End: 2023-08-17 | Stop reason: HOSPADM

## 2023-08-11 RX ORDER — PANTOPRAZOLE SODIUM 40 MG/1
40 TABLET, DELAYED RELEASE ORAL
Status: DISCONTINUED | OUTPATIENT
Start: 2023-08-12 | End: 2023-08-17 | Stop reason: HOSPADM

## 2023-08-11 RX ORDER — ACETAMINOPHEN 650 MG/1
650 SUPPOSITORY RECTAL EVERY 6 HOURS PRN
Status: DISCONTINUED | OUTPATIENT
Start: 2023-08-11 | End: 2023-08-17 | Stop reason: HOSPADM

## 2023-08-11 RX ADMIN — SODIUM CHLORIDE, PRESERVATIVE FREE 10 ML: 5 INJECTION INTRAVENOUS at 23:27

## 2023-08-11 RX ADMIN — SODIUM CHLORIDE 500 ML: 9 INJECTION, SOLUTION INTRAVENOUS at 21:09

## 2023-08-11 RX ADMIN — ENOXAPARIN SODIUM 30 MG: 100 INJECTION SUBCUTANEOUS at 23:22

## 2023-08-11 RX ADMIN — SODIUM CHLORIDE: 9 INJECTION, SOLUTION INTRAVENOUS at 23:21

## 2023-08-11 RX ADMIN — GUAIFENESIN 600 MG: 600 TABLET, EXTENDED RELEASE ORAL at 23:23

## 2023-08-11 RX ADMIN — DOCUSATE SODIUM 100 MG: 100 CAPSULE, LIQUID FILLED ORAL at 23:23

## 2023-08-11 RX ADMIN — ATORVASTATIN CALCIUM 80 MG: 80 TABLET, FILM COATED ORAL at 23:22

## 2023-08-11 RX ADMIN — CYCLOBENZAPRINE 5 MG: 10 TABLET, FILM COATED ORAL at 23:25

## 2023-08-11 RX ADMIN — FERROUS SULFATE TAB 325 MG (65 MG ELEMENTAL FE) 325 MG: 325 (65 FE) TAB at 23:22

## 2023-08-11 ASSESSMENT — PAIN SCALES - GENERAL
PAINLEVEL_OUTOF10: 9
PAINLEVEL_OUTOF10: 5

## 2023-08-11 ASSESSMENT — PAIN DESCRIPTION - LOCATION
LOCATION: BACK
LOCATION: NECK

## 2023-08-11 ASSESSMENT — PAIN - FUNCTIONAL ASSESSMENT: PAIN_FUNCTIONAL_ASSESSMENT: 0-10

## 2023-08-11 ASSESSMENT — PAIN DESCRIPTION - DESCRIPTORS: DESCRIPTORS: ACHING

## 2023-08-11 NOTE — TELEPHONE ENCOUNTER
RN from [facility] WILLEM VARELA called to follow up on previous messages. She reports yesterday pt.'s weight was 251 lbs, /?? In the AM and 145/61 in the PM, HR 47. Pt complaining of fatigue. RN reports pt accidentally called 911 yesterday multiple times and reports he is \"not himself\". She reports he is alert and oriented. RN gave scheduled dose of torsemide plus the PRN diuretic. Today weight is 249 lbs, /56, HR 47. Coreg held yesterday and today.  Please advise

## 2023-08-11 NOTE — ED PROVIDER NOTES
[address] [place] [address]  ED  EMERGENCY DEPARTMENT ENCOUNTER        Pt Name: Adry Andrews  MRN: [id number]  [address] [facility] [place] 1955  Date of evaluation: 8/11/2023  Provider: Sarah Davis PA-C  PCP: Reinaldo Moon MD  ED Attending: Hima Godwin MD       I have seen and evaluated this patient with my supervising physician Kale Bhardwaj MD.    CHIEF COMPLAINT:     Chief Complaint   Patient presents with    Fatigue     Patient comes from [facility] for worsening fatigue over the past 2 weeks. Per EMS he is supposed to be getting dialysis but patient has been refusing. HISTORY OF PRESENT ILLNESS:      History provided by the patient. No limitations. Adry Andrews is a 70 y.o. male who arrives to the ED by EMS from [facility]. This patient has an extensive past medical history. He is CODE STATUS is DNR CC arrest.  He has been complaining of worsening fatigue and generalized weakness for the last couple of weeks. Has a history of chronic kidney disease and had outpatient labs done. His BUN was reported to be greater than 100. Based on this and his complaints he is sent for evaluation. In reviewing the patient's chart he has hospitalizations approximately every 3 months for either acute on chronic CHF and or acute on chronic kidney disease. The patient is not experiencing any worsening shortness of breath today. He denies chest pain. He spoken with his nephrologist (Dr. Krishna Wilson) regarding dialysis and thus far they have been able to hold off on that. Nursing Notes were reviewed     REVIEW OF SYSTEMS:     Review of Systems  Positives and pertinent negatives as per HPI.       PAST MEDICAL HISTORY:     Past Medical History:   Diagnosis Date    Anemia     Angina     Arthritis     CAD (coronary artery disease)     CHF (congestive heart failure) ([facility])     CKD (chronic kidney disease) stage 3, GFR 30-59 ml/min ([facility])     Clostridium difficile diarrhea 3/16/12; 2/29/12    positive stool toxin Anesthesia Pre Eval Note    Anesthesia ROS/Med Hx    Overall Review:  EKG was reviewed     Anesthetic Complication History:  Patient does not have a history of anesthetic complications      Pulmonary Review:  Patient does not have a pulmonary history      Neuro/Psych Review:    Positive for psychiatric history    Cardiovascular Review:    Positive for hypertension  Positive for hyperlipidemia    GI/HEPATIC/RENAL Review:   Positive for liver disease     End/Other Review:  Positive for diabetes    Overall Review of Systems Comments:  Mult alcohol related DX      Relevant Problems   No relevant active problems       Physical Exam     Airway   Mallampati: II  TM Distance: >3 FB  Neck ROM: Limited    Cardiovascular  Cardiovascular exam normal    Pulmonary Exam  Pulmonary exam normal    Abdominal Exam  Abdominal exam normal      Anesthesia Plan    ASA Status: 3    Anesthesia Type: MAC    Induction: Intravenous  Preferred Airway Type: Mask    Checklist  Reviewed: Lab Results, Patient Summary, Allergies, Past Med History, EKG, Nursing Notes, Beta Blocker Status, Medications, Consultations, NPO Status and Problem list     stool. Do not exceed 4 doses in 24 hours. MAGNESIUM HYDROXIDE (MILK OF MAGNESIA) 400 MG/5ML SUSPENSION    Take 30 mLs by mouth daily as needed for Constipation Give if no BM for 3 days. Do not give more than 3 consecutive doses. MAGNESIUM OXIDE (MAG-OX) 400 (240 MG) MG TABLET    Take 1 tablet by mouth 2 times daily    METOLAZONE (ZAROXOLYN) 5 MG TABLET    Take 1 tablet by mouth See Admin Instructions MWF prn for wt gain >3lbs in 24h  or 5lbs in week    MINERAL OIL-HYDROPHILIC PETROLATUM (HYDROPHOR) OINTMENT    Apply to the nostrils nightly for moisturization    NALOXEGOL (MOVANTIK) 25 MG TABS TABLET    Take 1 tablet by mouth every morning (before breakfast)    ONDANSETRON (ZOFRAN-ODT) 4 MG DISINTEGRATING TABLET    Take 1 tablet by mouth every 6 hours as needed for Nausea    PANTOPRAZOLE (PROTONIX) 40 MG TABLET    Take 1 tablet by mouth daily    POLYETHYL GLYCOL-PROPYL GLYCOL 0.4-0.3 % (SYSTANE ULTRA) 0.4-0.3 % OPHTHALMIC SOLUTION    Place 1 drop into both eyes 4 times daily as needed for Dry Eyes    POLYETHYLENE GLYCOL (GLYCOLAX) 17 GM/SCOOP POWDER    Take 17 g by mouth 2 times daily as needed (constipation)    POTASSIUM CHLORIDE (KLOR-CON M) 10 MEQ EXTENDED RELEASE TABLET    Take 1 tablet by mouth daily    PREGABALIN (LYRICA) 50 MG CAPSULE    Take 1 capsule by mouth in the morning and 1 capsule in the evening. RHOPRESSA 0.02 % SOLN    Place 1 drop into the left eye nightly    SENNA (SENOKOT) 8.6 MG TABS TABLET    Take 1 tablet by mouth 2 times daily    SILVER SULFADIAZINE (SILVADENE) 1 % CREAM    Apply to BL lower leg ulcers daily    SODIUM CHLORIDE (OCEAN, BABY AYR) 0.65 % NASAL SPRAY    1 spray by Nasal route every 2 hours as needed for Congestion (epistaxis - only use while awake)    SODIUM PHOSPHATES (FLEET) 7-19 GM/118ML    Place 1 enema rectally daily as needed Administer if no BM in 12 hours following the glycerin suppository.     SPIRONOLACTONE (ALDACTONE) 25 MG TABLET    Take 1 tablet by mouth

## 2023-08-11 NOTE — TELEPHONE ENCOUNTER
Spoke with pt nurse Jose Johns. Nurse states they already planned on taking him to the hospital, was just waiting on our call. Jose Johns states she will have him brought to [facility].

## 2023-08-12 LAB
ANION GAP SERPL CALCULATED.3IONS-SCNC: 9 MMOL/L (ref 3–16)
BASOPHILS # BLD: 0 K/UL (ref 0–0.2)
BASOPHILS NFR BLD: 0.6 %
BUN SERPL-MCNC: 139 MG/DL (ref 7–20)
CALCIUM SERPL-MCNC: 9.5 MG/DL (ref 8.3–10.6)
CHLORIDE SERPL-SCNC: 96 MMOL/L (ref 99–110)
CO2 SERPL-SCNC: 29 MMOL/L (ref 21–32)
CREAT SERPL-MCNC: 1.8 MG/DL (ref 0.8–1.3)
DEPRECATED RDW RBC AUTO: 19.3 % (ref 12.4–15.4)
EKG ATRIAL RATE: 50 BPM
EKG DIAGNOSIS: NORMAL
EKG P-R INTERVAL: 186 MS
EKG Q-T INTERVAL: 438 MS
EKG QRS DURATION: 98 MS
EKG QTC CALCULATION (BAZETT): 399 MS
EKG R AXIS: -38 DEGREES
EKG T AXIS: 119 DEGREES
EKG VENTRICULAR RATE: 50 BPM
EOSINOPHIL # BLD: 0.2 K/UL (ref 0–0.6)
EOSINOPHIL NFR BLD: 3.8 %
GFR SERPLBLD CREATININE-BSD FMLA CKD-EPI: 40 ML/MIN/{1.73_M2}
GLUCOSE BLD-MCNC: 188 MG/DL (ref 70–99)
GLUCOSE BLD-MCNC: 189 MG/DL (ref 70–99)
GLUCOSE BLD-MCNC: 206 MG/DL (ref 70–99)
GLUCOSE BLD-MCNC: 216 MG/DL (ref 70–99)
GLUCOSE SERPL-MCNC: 289 MG/DL (ref 70–99)
HCT VFR BLD AUTO: 36.4 % (ref 40.5–52.5)
HGB BLD-MCNC: 12 G/DL (ref 13.5–17.5)
LYMPHOCYTES # BLD: 0.9 K/UL (ref 1–5.1)
LYMPHOCYTES NFR BLD: 13.6 %
MAGNESIUM SERPL-MCNC: 2.8 MG/DL (ref 1.8–2.4)
MCH RBC QN AUTO: 27.5 PG (ref 26–34)
MCHC RBC AUTO-ENTMCNC: 33.1 G/DL (ref 31–36)
MCV RBC AUTO: 83.1 FL (ref 80–100)
MONOCYTES # BLD: 0.5 K/UL (ref 0–1.3)
MONOCYTES NFR BLD: 7.7 %
NEUTROPHILS # BLD: 4.7 K/UL (ref 1.7–7.7)
NEUTROPHILS NFR BLD: 74.3 %
PERFORMED ON: ABNORMAL
PLATELET # BLD AUTO: 127 K/UL (ref 135–450)
PMV BLD AUTO: 9.6 FL (ref 5–10.5)
POTASSIUM SERPL-SCNC: 3.8 MMOL/L (ref 3.5–5.1)
RBC # BLD AUTO: 4.38 M/UL (ref 4.2–5.9)
SODIUM SERPL-SCNC: 134 MMOL/L (ref 136–145)
WBC # BLD AUTO: 6.3 K/UL (ref 4–11)

## 2023-08-12 PROCEDURE — 94761 N-INVAS EAR/PLS OXIMETRY MLT: CPT

## 2023-08-12 PROCEDURE — 2580000003 HC RX 258: Performed by: NURSE PRACTITIONER

## 2023-08-12 PROCEDURE — 6360000002 HC RX W HCPCS: Performed by: INTERNAL MEDICINE

## 2023-08-12 PROCEDURE — 6360000002 HC RX W HCPCS: Performed by: NURSE PRACTITIONER

## 2023-08-12 PROCEDURE — 6370000000 HC RX 637 (ALT 250 FOR IP): Performed by: INTERNAL MEDICINE

## 2023-08-12 PROCEDURE — 83735 ASSAY OF MAGNESIUM: CPT

## 2023-08-12 PROCEDURE — 2700000000 HC OXYGEN THERAPY PER DAY

## 2023-08-12 PROCEDURE — 1200000000 HC SEMI PRIVATE

## 2023-08-12 PROCEDURE — 93010 ELECTROCARDIOGRAM REPORT: CPT | Performed by: INTERNAL MEDICINE

## 2023-08-12 PROCEDURE — 85025 COMPLETE CBC W/AUTO DIFF WBC: CPT

## 2023-08-12 PROCEDURE — P9047 ALBUMIN (HUMAN), 25%, 50ML: HCPCS | Performed by: INTERNAL MEDICINE

## 2023-08-12 PROCEDURE — 2500000003 HC RX 250 WO HCPCS: Performed by: NURSE PRACTITIONER

## 2023-08-12 PROCEDURE — 83036 HEMOGLOBIN GLYCOSYLATED A1C: CPT

## 2023-08-12 PROCEDURE — 80048 BASIC METABOLIC PNL TOTAL CA: CPT

## 2023-08-12 PROCEDURE — 6370000000 HC RX 637 (ALT 250 FOR IP): Performed by: NURSE PRACTITIONER

## 2023-08-12 RX ORDER — ALBUMIN (HUMAN) 12.5 G/50ML
25 SOLUTION INTRAVENOUS EVERY 6 HOURS
Status: COMPLETED | OUTPATIENT
Start: 2023-08-12 | End: 2023-08-13

## 2023-08-12 RX ORDER — BRIMONIDINE TARTRATE AND TIMOLOL MALEATE 2; 5 MG/ML; MG/ML
1 SOLUTION OPHTHALMIC 2 TIMES DAILY
Status: DISCONTINUED | OUTPATIENT
Start: 2023-08-12 | End: 2023-08-14

## 2023-08-12 RX ORDER — DORZOLAMIDE HCL 20 MG/ML
1 SOLUTION/ DROPS OPHTHALMIC 2 TIMES DAILY
Status: DISCONTINUED | OUTPATIENT
Start: 2023-08-12 | End: 2023-08-14

## 2023-08-12 RX ORDER — FUROSEMIDE 10 MG/ML
80 INJECTION INTRAMUSCULAR; INTRAVENOUS 2 TIMES DAILY
Status: DISCONTINUED | OUTPATIENT
Start: 2023-08-12 | End: 2023-08-16

## 2023-08-12 RX ORDER — OXYCODONE HYDROCHLORIDE AND ACETAMINOPHEN 5; 325 MG/1; MG/1
1 TABLET ORAL EVERY 6 HOURS PRN
Status: DISCONTINUED | OUTPATIENT
Start: 2023-08-12 | End: 2023-08-17 | Stop reason: HOSPADM

## 2023-08-12 RX ORDER — ENOXAPARIN SODIUM 100 MG/ML
30 INJECTION SUBCUTANEOUS 2 TIMES DAILY
Status: DISCONTINUED | OUTPATIENT
Start: 2023-08-14 | End: 2023-08-17 | Stop reason: HOSPADM

## 2023-08-12 RX ADMIN — OXYCODONE HYDROCHLORIDE AND ACETAMINOPHEN 500 MG: 500 TABLET ORAL at 08:22

## 2023-08-12 RX ADMIN — SILVER SULFADIAZINE: 10 CREAM TOPICAL at 20:07

## 2023-08-12 RX ADMIN — INSULIN GLARGINE 40 UNITS: 100 INJECTION, SOLUTION SUBCUTANEOUS at 00:15

## 2023-08-12 RX ADMIN — BRIMONIDINE TARTRATE AND TIMOLOL MALEATE 1 DROP: 2; 5 SOLUTION OPHTHALMIC at 20:08

## 2023-08-12 RX ADMIN — ENOXAPARIN SODIUM 30 MG: 100 INJECTION SUBCUTANEOUS at 08:22

## 2023-08-12 RX ADMIN — GUAIFENESIN 600 MG: 600 TABLET, EXTENDED RELEASE ORAL at 20:03

## 2023-08-12 RX ADMIN — ACETAMINOPHEN 650 MG: 325 TABLET ORAL at 05:53

## 2023-08-12 RX ADMIN — OXYCODONE HYDROCHLORIDE AND ACETAMINOPHEN 1 TABLET: 5; 325 TABLET ORAL at 14:13

## 2023-08-12 RX ADMIN — PANTOPRAZOLE SODIUM 40 MG: 40 TABLET, DELAYED RELEASE ORAL at 06:53

## 2023-08-12 RX ADMIN — CYCLOBENZAPRINE 5 MG: 10 TABLET, FILM COATED ORAL at 20:04

## 2023-08-12 RX ADMIN — POTASSIUM CHLORIDE 10 MEQ: 750 TABLET, EXTENDED RELEASE ORAL at 08:22

## 2023-08-12 RX ADMIN — SILVER SULFADIAZINE: 10 CREAM TOPICAL at 08:24

## 2023-08-12 RX ADMIN — SODIUM CHLORIDE, PRESERVATIVE FREE 10 ML: 5 INJECTION INTRAVENOUS at 20:08

## 2023-08-12 RX ADMIN — TAMSULOSIN HYDROCHLORIDE 0.8 MG: 0.4 CAPSULE ORAL at 06:52

## 2023-08-12 RX ADMIN — GUAIFENESIN 600 MG: 600 TABLET, EXTENDED RELEASE ORAL at 08:22

## 2023-08-12 RX ADMIN — OXYCODONE HYDROCHLORIDE AND ACETAMINOPHEN 1 TABLET: 5; 325 TABLET ORAL at 20:05

## 2023-08-12 RX ADMIN — DOCUSATE SODIUM 100 MG: 100 CAPSULE, LIQUID FILLED ORAL at 08:22

## 2023-08-12 RX ADMIN — FERROUS SULFATE TAB 325 MG (65 MG ELEMENTAL FE) 325 MG: 325 (65 FE) TAB at 20:03

## 2023-08-12 RX ADMIN — BRIMONIDINE TARTRATE AND TIMOLOL MALEATE 1 DROP: 2; 5 SOLUTION OPHTHALMIC at 03:30

## 2023-08-12 RX ADMIN — CARVEDILOL 3.12 MG: 3.12 TABLET, FILM COATED ORAL at 08:22

## 2023-08-12 RX ADMIN — ALLOPURINOL 300 MG: 300 TABLET ORAL at 08:22

## 2023-08-12 RX ADMIN — ENOXAPARIN SODIUM 30 MG: 100 INJECTION SUBCUTANEOUS at 20:02

## 2023-08-12 RX ADMIN — SODIUM CHLORIDE, PRESERVATIVE FREE 10 ML: 5 INJECTION INTRAVENOUS at 08:23

## 2023-08-12 RX ADMIN — DOCUSATE SODIUM 100 MG: 100 CAPSULE, LIQUID FILLED ORAL at 20:02

## 2023-08-12 RX ADMIN — INSULIN LISPRO 4 UNITS: 100 INJECTION, SOLUTION INTRAVENOUS; SUBCUTANEOUS at 08:24

## 2023-08-12 RX ADMIN — BRIMONIDINE TARTRATE AND TIMOLOL MALEATE 1 DROP: 2; 5 SOLUTION OPHTHALMIC at 08:23

## 2023-08-12 RX ADMIN — DORZOLAMIDE HCL 1 DROP: 20 SOLUTION/ DROPS OPHTHALMIC at 20:09

## 2023-08-12 RX ADMIN — FERROUS SULFATE TAB 325 MG (65 MG ELEMENTAL FE) 325 MG: 325 (65 FE) TAB at 08:23

## 2023-08-12 RX ADMIN — ALBUMIN (HUMAN) 25 G: 0.25 INJECTION, SOLUTION INTRAVENOUS at 18:30

## 2023-08-12 RX ADMIN — FUROSEMIDE 80 MG: 10 INJECTION, SOLUTION INTRAMUSCULAR; INTRAVENOUS at 18:28

## 2023-08-12 RX ADMIN — SILVER SULFADIAZINE: 10 CREAM TOPICAL at 03:30

## 2023-08-12 RX ADMIN — ATORVASTATIN CALCIUM 80 MG: 80 TABLET, FILM COATED ORAL at 20:01

## 2023-08-12 RX ADMIN — ASPIRIN 81 MG: 81 TABLET, COATED ORAL at 08:22

## 2023-08-12 RX ADMIN — CARVEDILOL 3.12 MG: 3.12 TABLET, FILM COATED ORAL at 17:16

## 2023-08-12 RX ADMIN — DORZOLAMIDE HCL 1 DROP: 20 SOLUTION/ DROPS OPHTHALMIC at 08:23

## 2023-08-12 RX ADMIN — PREGABALIN 50 MG: 25 CAPSULE ORAL at 00:17

## 2023-08-12 RX ADMIN — INSULIN GLARGINE 40 UNITS: 100 INJECTION, SOLUTION SUBCUTANEOUS at 20:03

## 2023-08-12 RX ADMIN — PREGABALIN 50 MG: 25 CAPSULE ORAL at 12:16

## 2023-08-12 RX ADMIN — INSULIN LISPRO 4 UNITS: 100 INJECTION, SOLUTION INTRAVENOUS; SUBCUTANEOUS at 00:16

## 2023-08-12 ASSESSMENT — PAIN DESCRIPTION - PAIN TYPE
TYPE: CHRONIC PAIN
TYPE: CHRONIC PAIN

## 2023-08-12 ASSESSMENT — PAIN DESCRIPTION - LOCATION
LOCATION: BACK

## 2023-08-12 ASSESSMENT — PAIN SCALES - GENERAL
PAINLEVEL_OUTOF10: 8
PAINLEVEL_OUTOF10: 8
PAINLEVEL_OUTOF10: 5
PAINLEVEL_OUTOF10: 8
PAINLEVEL_OUTOF10: 0

## 2023-08-12 ASSESSMENT — PAIN DESCRIPTION - DESCRIPTORS: DESCRIPTORS: ACHING

## 2023-08-12 NOTE — CONSULTS
Consult received for CHF diet education. Pt sleeping at time of visit. Left HF nutrition therapy at [facility] that discusses low sodium diet, daily weights, and fluid restriction. Provided carb control diet education in addition. Pt has received multiple CHF diet educations in the past. Will readdress diet education on follow up per pt and RD availability.      Marsha Fernando MS, RD, LD on 8/12/2023 at 11:59 AM  Office: [phone]

## 2023-08-12 NOTE — ED PROVIDER NOTES
I independently performed a history and physical on [facility]. All diagnostic, treatment, and disposition decisions were made by myself in conjunction with the advanced practice provider. I personally saw the patient and performed a substantive portion of the visit including all aspects of the medical decision making. For further details of Shelli Cook emergency department encounter, please see EYAD Hugo's documentation. Patient is a 62-year-old male presenting today due to increased fatigue along with shortness of breath and overall not feeling well. He reports that he was told he may need dialysis in the future but denies starting it yet. He denies any chest pain. He does report bilateral leg swelling. He is currently from [facility] and reports worsening symptoms for the last 2 weeks. No fever. He gets around with a walker. He reports chronic neck pain but nothing out of the ordinary. Due to concern for increasing fatigue and shortness of breath, he came to the emergency department for further evaluation. There was concern his kidney function was worsening at the nursing home. Physical:   Gen: No acute distress. AOx3. Psych: Depressed mood and affect  HEENT: NCAT  Neck: supple  Cardiac: RRR, pulses 2+ in lower extremities with dorsalis pedis pulses, 3+ pitting edema to bilateral lower extremities, no calf tenderness  Lungs: Normal effort  Abdomen: soft and nontender with no R/D/G  Neuro: GCS equals 15, moving all extremities independently      The Ekg interpreted by me shows  sinus bradycardia, rate=50  with premature atrial complexes noted   Axis is   Left axis deviation  QTc is  normal  Intervals and Durations are unremarkable. ST Segments: no acute change and nonspecific changes  No significant change from prior EKG dated - 5/19/23  No STEMI         I was the primary provider for the patient along with EYAD Renee.     MDM: Patient was evaluated due to concern for increasing fatigue and shortness of breath over the past 2 weeks with concern for worsening kidney function. His BUN was critically elevated and nephrology was ultimately consulted. He will need to be admitted for further evaluation related to this. He was stable for the floor and agreed with this plan. He denied any chest pain and story not concerning for acute coronary syndrome or pulmonary embolism. Troponin can be trended at this time although it is most likely elevated due to kidney failure. I do not suspect infection at this time.           Edwardo Burgos MD  08/16/23 0040

## 2023-08-12 NOTE — PROGRESS NOTES
Writer verified with the patient about his code status, his previous code status last May 2023 was limited code. Patient  told me that he only wants to be shocked during resuscitation. NP notified to update the patient's current code status. Alert and oriented X4.

## 2023-08-12 NOTE — ED NOTES
Mr. Norberto Cbaa is a 70 y.o. male who had concerns including Fatigue (Patient comes from [facility]  for worsening fatigue over the past 2 weeks. Per EMS he is supposed to be getting dialysis but patient has been refusing. ). Chief Complaint   Patient presents with    Fatigue     Patient comes from [facility]  for worsening fatigue over the past 2 weeks. Per EMS he is supposed to be getting dialysis but patient has been refusing. He is being admitted for:    Azotemia    His ED problem list included:    1. Acute renal failure superimposed on chronic kidney disease, unspecified CKD stage, unspecified acute renal failure type ([address])    2.  Type 2 diabetes mellitus with hyperglycemia, unspecified whether long term insulin use ([facility])        Past Medical History:   Diagnosis Date    Anemia     Angina     Arthritis     CAD (coronary artery disease)     CHF (congestive heart failure) ([facility])     CKD (chronic kidney disease) stage 3, GFR 30-59 ml/min ([facility])     Clostridium difficile diarrhea 3/16/12; 2/29/12    positive stool toxin    Diabetes mellitus ([address])     Diabetic neuropathy ([facility])     feet and legs    Disease of blood and blood forming organ     Dizziness     When he moves his head/ loses his balance    GERD (gastroesophageal reflux disease)     gastric ulcer    Headache     Hearing loss     Hyperlipidemia     Hypertension     Kidney stone 2002    Refusal of blood transfusions as patient is Quaker     Sleep apnea     Tinnitus     Venous ulcer ([address])     LLE    Wound, open 1/13/2012       Past Surgical History:   Procedure Laterality Date    CARDIAC SURGERY      Dec 27 2010, triple bypass    CHOLECYSTECTOMY  9/2011    HERNIA REPAIR  age2    KNEE ARTHROCENTESIS Right 10/6/2022    RIGHT SHOULDER INTRA ARTICULAR INJECTION SITE CONFIRMED BY FLUOROSCOPY performed by Clarinda Kussmaul, MD at [facility] OR    OTHER SURGICAL HISTORY  11-16-11 REPAIR LOWER STERNAL INCISION, REMOVAL OF ONE STERNAL WIRE,    OTHER SURGICAL HISTORY  10/10/2014    phacoemulsification of cataract with intraocular lens implant left eye    PAIN MANAGEMENT PROCEDURE N/A 2/10/2022    MIDLINE CERVICAL SIX SEVEN EPIDURAL STEROID INJECTION SITE CONFIRMED BY FLUOROSCOPY performed by Mauricio Cortes MD at [facility] N/A 7/21/2022    MIDLINE TO RIGHT CERVICAL SIX SEVEN EPIDURAL STEROID INJECTION SITE CONFIRMED BY FLUOROSCOPY performed by Mauricio Cortes MD at [facility]         His recent abnormal labs were:    Labs Reviewed   CBC WITH AUTO DIFFERENTIAL - Abnormal; Notable for the following components:       Result Value    Hemoglobin 11.8 (*)     Hematocrit 36.1 (*)     RDW 19.4 (*)     Platelets 319 (*)     Lymphocytes Absolute 0.7 (*)     All other components within normal limits   COMPREHENSIVE METABOLIC PANEL - Abnormal; Notable for the following components:    Sodium 131 (*)     Chloride 92 (*)     Glucose 410 (*)      (*)     Creatinine 2.1 (*)     Est, Glom Filt Rate 33 (*)     Albumin/Globulin Ratio 1.0 (*)     AST 13 (*)     All other components within normal limits    Narrative:     Jesse Herrera tel. [phone],  Chemistry results called to and read back by Araceli Billingsley RN, 08/11/2023  21:02, by [address] [place] [address] St - Abnormal; Notable for the following components:    Magnesium 2.70 (*)     All other components within normal limits    Narrative:     Jesse Herrera tel. [phone],  Chemistry results called to and read back by Araceli Billingsley RN, 08/11/2023  21:02, by [employer]   TROPONIN - Abnormal; Notable for the following components:    Troponin, High Sensitivity 66 (*)     All other components within normal limits    Narrative:     Jesse Herrera tel. [phone],  Chemistry results called to and read back by Araceli Billingsley RN, 08/11/2023  21:02, by [employer]   TROPONIN - Abnormal; Notable for the following components:    Troponin, High Sensitivity 65 (*)     All other components

## 2023-08-12 NOTE — CONSULTS
myelopathy 09/24/2018    Lumbosacral spondylosis without myelopathy 09/24/2018    Lumbar facet arthropathy 02/11/2019    Disc displacement, lumbar 02/11/2019    Spinal stenosis of lumbar region 02/11/2019    Mixed conductive and sensorineural hearing loss of left ear with restricted hearing of right ear 10/15/2020    Recurrent acute suppurative otitis media with spontaneous rupture of left tympanic membrane 10/15/2020    Chest pain 08/23/2021    Cor pulmonale, chronic ([facility])     Pulmonary hypertension due to alveolar hypoventilation disorder ([facility])     Aortic valve stenosis     Chronic neck pain 09/29/2021    Dyspnea 20/89/1850    Acute diastolic CHF (congestive heart failure) ([address]) 01/13/2022    Cervical stenosis of spinal canal 01/19/2022    Degeneration of cervical intervertebral disc 01/19/2022    Cervical radiculitis     Acute on chronic diastolic CHF (congestive heart failure) ([address]) 03/07/2022    Acute respiratory failure with hypoxia ([address]) 03/31/2022    Acute on chronic renal insufficiency     PAF (paroxysmal atrial fibrillation) ([facility])     Acute diastolic HF (heart failure) ([address]) 04/24/2022    Subdural hematoma ([address]) 05/29/2022    SDH (subdural hematoma) ([address]) 05/29/2022    Acute on chronic respiratory failure with hypoxemia ([address]) 05/29/2022    Fluid overload 08/24/2022    MARGARITO (acute kidney injury) ([address]) 08/28/2022    Epistaxis 02/06/2023    Acute heart failure, unspecified heart failure type ([address]) 02/20/2023    Weight gain 02/20/2023    Acute on chronic congestive heart failure ([address]) 02/20/2023    Acute on chronic diastolic (congestive) heart failure ([address]) 05/19/2023    Anemia due to stage 3b chronic kidney disease ([address]) 05/22/2023     Resolved Ambulatory Problems     Diagnosis Date Noted    CHF exacerbation ([address])     Acute on chronic renal failure ([address])     Hypoxia     Acute respiratory failure with hypoxia ([address]) 09/15/2016    Acute on chronic heart failure ([address]) 09/15/2016    Otitis externa, fungal, left ear 10/15/2020    Elevated troponin 2022    Dehydration 10/21/2022    Acute clinical systolic heart failure ([address]) 2023     Past Medical History:   Diagnosis Date    Anemia     Angina     Arthritis     CAD (coronary artery disease)     CHF (congestive heart failure) ([facility])     CKD (chronic kidney disease) stage 3, GFR 30-59 ml/min ([facility])     Clostridium difficile diarrhea 3/16/12; 12    Diabetes mellitus ([address])     Diabetic neuropathy ([facility])     Disease of blood and blood forming organ     Dizziness     GERD (gastroesophageal reflux disease)     Headache     Hearing loss     Hyperlipidemia     Hypertension     Kidney stone     Refusal of blood transfusions as patient is Islam     Sleep apnea     Tinnitus     Venous ulcer ([facility])     Wound, open 2012         Review of Systems     Constitutional:  No weight loss, no fever/chills  Eyes:  No eye pain, no eye redness  Cardiovascular:  No chest pain, + edema  Respiratory:  No hemoptysis, no stridor  Gastrointestinal:  No blood in stool, no n/v, no diarrhea  Genitoruinary:  No hematuria, no difficulty with urination  Musculoskeletal:  No joint swelling, no redness  Integumentary:  No Rash, no itching  Neurological:  No focal weakness, No new sensory deficit  Psychiatric:  No depression, no confusion  Endocrine:  No polyuria, no polydipsia       Medications      Reviewed in EMR     Allergies     Amitriptyline, Celecoxib, Cymbalta [duloxetine hcl], Elavil [amitriptyline hcl], Gabapentin, and Nsaids      Family History       Negative for Kidney Disease    Social History      Social History     Socioeconomic History    Marital status: Single     Spouse name: None    Number of children: None    Years of education: None    Highest education level: None   Tobacco Use    Smoking status: Former     Packs/day: 1.00     Years: 16.00     Pack years: 16.00     Types: Cigarettes     Quit date: 1/10/1988     Years since quittin.6    Smokeless tobacco:

## 2023-08-12 NOTE — PROGRESS NOTES
Hospitalist Progress Note    CC: Azotemia    Name:  Brianna Anne /Age/Sex: 1955  (70 y.o. male)   MRN & CSN:  [id number] & [id number] Encounter Date/Time: 2023 1:56 PM EDT   Location:  [facility] PCP: MD Radha Shin MD         Hospital course:  70 y.o. male with a significant past medical history of hypertension, hyperlipidemia, type 2 diabetes, chronic diastolic heart failure, COPD, CAD, and CKD who presents to [facility] emergency permit from [facility] with reports of elevated BUN on routine blood work. He notes recently has been having mild symptoms of fatigue and weakness at nursing home which prompted blood work. Blood work from today's encounter is not available for review but repeat blood work was performed here in the ED. Pertinent findings include sodium 131, chloride 92, , creatinine 2.1, magnesium 2.7, blood sugar 410, proBNP 2373, initial at bedtime troponin 66 with repeat of 65. Nephrology was consulted, recommended holding diuretic therapy, slow MIVF, repeat lab work in the morning. Patient denies any other symptoms of the chest pain, dyspnea, fever, or poor urinary output. Chest x-ray tonight showed stable chronic cardiomegaly with stable central pulmonary congestion.   Patient received following cc bolus in the emergency department    Admit date: 2023  Days in hospital:  Hospital Day: 2  Status:  Inpatient       24 Hour Events: having pain    Subjective: pt usually takes percocet and has not had it since yesterday AM - will resume - pt knows he has end stage kidney disease but still feels that he wants to try and hold off on HD because of how his lifestyle would change  He feels his abd is more bloated    ROS:   A comprehensive review of systems was negative except for: chronic pain, abd more distended, legs more swollen      Objective:    BP (!) 118/42

## 2023-08-12 NOTE — H&P
Hospital Medicine History & Physical        Date of Service: 8/11/2023    Time of Service: 2140    Disposition:    [x]Admitted to inpatient status with expected LOS greater than two midnights due to medical therapy. []Placed in observation status. Chief Admission Complaint:  Elevated renal function on bloodwork    Presenting Admission History:      Henry Hernandez is a/an 70 y.o. male with a significant past medical history of hypertension, hyperlipidemia, type 2 diabetes, chronic diastolic heart failure, COPD, CAD, and CKD who presents to [facility] emergency permit from [facility] with reports of elevated BUN on routine blood work. He notes recently has been having mild symptoms of fatigue and weakness at nursing home which prompted blood work. Blood work from today's encounter is not available for review but repeat blood work was performed here in the ED. Pertinent findings include sodium 131, chloride 92, , creatinine 2.1, magnesium 2.7, blood sugar 410, proBNP 2373, initial at bedtime troponin 66 with repeat of 65. Nephrology was consulted, recommended holding diuretic therapy, slow MIVF, repeat lab work in the morning. Patient denies any other symptoms of the chest pain, dyspnea, fever, or poor urinary output. Chest x-ray tonight showed stable chronic cardiomegaly with stable central pulmonary congestion. Patient received following cc bolus in the emergency department. Hospital team was consulted to admit this patient for elevated BUN in setting of CKD and diuretic use at the nursing facility.     Assessment/Plan:      Current Principal Problem:  Azotemia    Elevated Renal Function/History of CKD  - Azotemic with BUN of 133, last known to be 85 on 7/20/2023  - Cr trending upward at 2.1 tonight, last known to be 1.7 on 7/20/2023  - Nephrology recommended slow MIVF, hold diuretic therapy for now  - NS infusion at 75/hr x 1L VTBI  - Repeat labs in AM  - Nephrology to eval in

## 2023-08-12 NOTE — PROGRESS NOTES
Patient admitted  to room 217 from ED due to azotemia. Patient oriented to room , call light , bedrails, phone, lights and bathroom . Telemetry box in place , patient aware of placement and reason. Bed locked , in lowest position , siderails up 3/4, call light within reach Plan  of care ongoing. 4 eyes skin assessment completed with Carlos Vallejo RN and [facility]. Admission assessment completed and documented on flowsheets.

## 2023-08-12 NOTE — CONSULTS
Called the [facility] @ 2108  PER: Gisela Guevara  RE: Dr. Loida Thomason group ---   Jesús Lopez responded to page @ [phone]

## 2023-08-12 NOTE — PLAN OF CARE
Problem: Chronic Conditions and Co-morbidities  Goal: Patient's chronic conditions and co-morbidity symptoms are monitored and maintained or improved  Outcome: Progressing     Patient's EF (Ejection Fraction) is greater than 40%    Heart Failure Medications:  Diuretics[de-identified] None    (One of the following REQUIRED for EF </= 40%/SYSTOLIC FAILURE but MAY be used in EF% >40%/DIASTOLIC FAILURE)        ACE[de-identified] None        ARB[de-identified] None         ARNI[de-identified] None    (Beta Blockers)  NON- Evidenced Based Beta Blocker (for EF% >40%/DIASTOLIC FAILURE): None    Evidenced Based Beta Blocker::(REQUIRED for EF% <40%/SYSTOLIC FAILURE) Carvedilol- Coreg  . .................................................................................................................................................. Healthy Weight Tracking - BMI + Meds 5/20/2023 5/21/2023 5/22/2023 5/23/2023 5/24/2023 8/11/2023 8/12/2023   Weight 256 lb 4.8 oz 252 lb 4.8 oz 251 lb 249 lb 12.8 oz 250 lb 11.2 oz 249 lb 250 lb   Height - 5' 5\" - - - 5' 9\" 5' 9\"   Body Mass Index - - 41.77 kg/m2 41.57 kg/m2 41.72 kg/m2 36.77 kg/m2 36.92 kg/m2   Some recent data might be hidden         Patient's weights and intake/output reviewed: Yes    Daily Weight log at bedside, patient/family participation in use of log: no    Patient's Last Weight: 250 lbs obtained by standing scale. Difference of 0 lbs less than last documented weight.       Intake/Output Summary (Last 24 hours) at 8/12/2023 0741  Last data filed at 8/12/2023 0114  Gross per 24 hour   Intake 120 ml   Output 1200 ml   Net -1080 ml       Education Booklet Provided: yes    Comorbidities Reviewed Yes    Patient has a past medical history of Anemia, Angina, Arthritis, CAD (coronary artery disease), CHF (congestive heart failure) ([address]), CKD (chronic kidney disease) stage 3, GFR 30-59 ml/min ([address]), Clostridium difficile diarrhea, Diabetes mellitus ([address]), Diabetic neuropathy ([address]), Disease of blood and blood forming organ,

## 2023-08-13 LAB
ALBUMIN SERPL-MCNC: 4.1 G/DL (ref 3.4–5)
ANION GAP SERPL CALCULATED.3IONS-SCNC: 12 MMOL/L (ref 3–16)
BASOPHILS # BLD: 0 K/UL (ref 0–0.2)
BASOPHILS NFR BLD: 0.5 %
BUN SERPL-MCNC: 126 MG/DL (ref 7–20)
CALCIUM SERPL-MCNC: 9.8 MG/DL (ref 8.3–10.6)
CHLORIDE SERPL-SCNC: 97 MMOL/L (ref 99–110)
CO2 SERPL-SCNC: 29 MMOL/L (ref 21–32)
CREAT SERPL-MCNC: 1.7 MG/DL (ref 0.8–1.3)
DEPRECATED RDW RBC AUTO: 19.2 % (ref 12.4–15.4)
EOSINOPHIL # BLD: 0.3 K/UL (ref 0–0.6)
EOSINOPHIL NFR BLD: 4.1 %
EST. AVERAGE GLUCOSE BLD GHB EST-MCNC: 217.3 MG/DL
GFR SERPLBLD CREATININE-BSD FMLA CKD-EPI: 43 ML/MIN/{1.73_M2}
GLUCOSE BLD-MCNC: 139 MG/DL (ref 70–99)
GLUCOSE BLD-MCNC: 199 MG/DL (ref 70–99)
GLUCOSE BLD-MCNC: 211 MG/DL (ref 70–99)
GLUCOSE BLD-MCNC: 260 MG/DL (ref 70–99)
GLUCOSE SERPL-MCNC: 167 MG/DL (ref 70–99)
HBA1C MFR BLD: 9.2 %
HCT VFR BLD AUTO: 36.3 % (ref 40.5–52.5)
HGB BLD-MCNC: 12.2 G/DL (ref 13.5–17.5)
LYMPHOCYTES # BLD: 0.9 K/UL (ref 1–5.1)
LYMPHOCYTES NFR BLD: 15 %
MAGNESIUM SERPL-MCNC: 2.9 MG/DL (ref 1.8–2.4)
MCH RBC QN AUTO: 28 PG (ref 26–34)
MCHC RBC AUTO-ENTMCNC: 33.7 G/DL (ref 31–36)
MCV RBC AUTO: 83.3 FL (ref 80–100)
MONOCYTES # BLD: 0.5 K/UL (ref 0–1.3)
MONOCYTES NFR BLD: 8.2 %
NEUTROPHILS # BLD: 4.5 K/UL (ref 1.7–7.7)
NEUTROPHILS NFR BLD: 72.2 %
PERFORMED ON: ABNORMAL
PHOSPHATE SERPL-MCNC: 3.9 MG/DL (ref 2.5–4.9)
PLATELET # BLD AUTO: 132 K/UL (ref 135–450)
PMV BLD AUTO: 9.5 FL (ref 5–10.5)
POTASSIUM SERPL-SCNC: 4.1 MMOL/L (ref 3.5–5.1)
RBC # BLD AUTO: 4.36 M/UL (ref 4.2–5.9)
SODIUM SERPL-SCNC: 138 MMOL/L (ref 136–145)
WBC # BLD AUTO: 6.3 K/UL (ref 4–11)

## 2023-08-13 PROCEDURE — 6360000002 HC RX W HCPCS: Performed by: INTERNAL MEDICINE

## 2023-08-13 PROCEDURE — P9047 ALBUMIN (HUMAN), 25%, 50ML: HCPCS | Performed by: INTERNAL MEDICINE

## 2023-08-13 PROCEDURE — 1200000000 HC SEMI PRIVATE

## 2023-08-13 PROCEDURE — 2700000000 HC OXYGEN THERAPY PER DAY

## 2023-08-13 PROCEDURE — 83735 ASSAY OF MAGNESIUM: CPT

## 2023-08-13 PROCEDURE — 94761 N-INVAS EAR/PLS OXIMETRY MLT: CPT

## 2023-08-13 PROCEDURE — 85025 COMPLETE CBC W/AUTO DIFF WBC: CPT

## 2023-08-13 PROCEDURE — 6370000000 HC RX 637 (ALT 250 FOR IP): Performed by: NURSE PRACTITIONER

## 2023-08-13 PROCEDURE — 80069 RENAL FUNCTION PANEL: CPT

## 2023-08-13 PROCEDURE — 6370000000 HC RX 637 (ALT 250 FOR IP): Performed by: INTERNAL MEDICINE

## 2023-08-13 PROCEDURE — 2580000003 HC RX 258: Performed by: NURSE PRACTITIONER

## 2023-08-13 RX ORDER — ALBUMIN (HUMAN) 12.5 G/50ML
25 SOLUTION INTRAVENOUS EVERY 6 HOURS
Status: COMPLETED | OUTPATIENT
Start: 2023-08-13 | End: 2023-08-14

## 2023-08-13 RX ADMIN — SILVER SULFADIAZINE: 10 CREAM TOPICAL at 08:55

## 2023-08-13 RX ADMIN — INSULIN LISPRO 4 UNITS: 100 INJECTION, SOLUTION INTRAVENOUS; SUBCUTANEOUS at 12:39

## 2023-08-13 RX ADMIN — GUAIFENESIN 600 MG: 600 TABLET, EXTENDED RELEASE ORAL at 08:52

## 2023-08-13 RX ADMIN — TAMSULOSIN HYDROCHLORIDE 0.8 MG: 0.4 CAPSULE ORAL at 05:37

## 2023-08-13 RX ADMIN — FUROSEMIDE 80 MG: 10 INJECTION, SOLUTION INTRAMUSCULAR; INTRAVENOUS at 08:52

## 2023-08-13 RX ADMIN — OXYCODONE HYDROCHLORIDE AND ACETAMINOPHEN 1 TABLET: 5; 325 TABLET ORAL at 11:56

## 2023-08-13 RX ADMIN — POTASSIUM CHLORIDE 10 MEQ: 750 TABLET, EXTENDED RELEASE ORAL at 08:53

## 2023-08-13 RX ADMIN — ASPIRIN 81 MG: 81 TABLET, COATED ORAL at 08:52

## 2023-08-13 RX ADMIN — FERROUS SULFATE TAB 325 MG (65 MG ELEMENTAL FE) 325 MG: 325 (65 FE) TAB at 21:23

## 2023-08-13 RX ADMIN — CARVEDILOL 3.12 MG: 3.12 TABLET, FILM COATED ORAL at 17:32

## 2023-08-13 RX ADMIN — ALLOPURINOL 300 MG: 300 TABLET ORAL at 08:52

## 2023-08-13 RX ADMIN — FUROSEMIDE 80 MG: 10 INJECTION, SOLUTION INTRAMUSCULAR; INTRAVENOUS at 17:32

## 2023-08-13 RX ADMIN — ALBUMIN (HUMAN) 25 G: 0.25 INJECTION, SOLUTION INTRAVENOUS at 02:11

## 2023-08-13 RX ADMIN — ENOXAPARIN SODIUM 30 MG: 100 INJECTION SUBCUTANEOUS at 21:25

## 2023-08-13 RX ADMIN — BRIMONIDINE TARTRATE AND TIMOLOL MALEATE 1 DROP: 2; 5 SOLUTION OPHTHALMIC at 08:52

## 2023-08-13 RX ADMIN — ENOXAPARIN SODIUM 30 MG: 100 INJECTION SUBCUTANEOUS at 08:52

## 2023-08-13 RX ADMIN — DORZOLAMIDE HCL 1 DROP: 20 SOLUTION/ DROPS OPHTHALMIC at 08:52

## 2023-08-13 RX ADMIN — BRIMONIDINE TARTRATE AND TIMOLOL MALEATE 1 DROP: 2; 5 SOLUTION OPHTHALMIC at 21:25

## 2023-08-13 RX ADMIN — ALBUMIN (HUMAN) 25 G: 0.25 INJECTION, SOLUTION INTRAVENOUS at 21:41

## 2023-08-13 RX ADMIN — DORZOLAMIDE HCL 1 DROP: 20 SOLUTION/ DROPS OPHTHALMIC at 21:25

## 2023-08-13 RX ADMIN — DOCUSATE SODIUM 100 MG: 100 CAPSULE, LIQUID FILLED ORAL at 21:23

## 2023-08-13 RX ADMIN — SODIUM CHLORIDE, PRESERVATIVE FREE 10 ML: 5 INJECTION INTRAVENOUS at 08:52

## 2023-08-13 RX ADMIN — PANTOPRAZOLE SODIUM 40 MG: 40 TABLET, DELAYED RELEASE ORAL at 05:38

## 2023-08-13 RX ADMIN — GUAIFENESIN 600 MG: 600 TABLET, EXTENDED RELEASE ORAL at 21:22

## 2023-08-13 RX ADMIN — INSULIN GLARGINE 40 UNITS: 100 INJECTION, SOLUTION SUBCUTANEOUS at 21:24

## 2023-08-13 RX ADMIN — PREGABALIN 50 MG: 25 CAPSULE ORAL at 11:53

## 2023-08-13 RX ADMIN — ALBUMIN (HUMAN) 25 G: 0.25 INJECTION, SOLUTION INTRAVENOUS at 16:03

## 2023-08-13 RX ADMIN — FERROUS SULFATE TAB 325 MG (65 MG ELEMENTAL FE) 325 MG: 325 (65 FE) TAB at 08:52

## 2023-08-13 RX ADMIN — ATORVASTATIN CALCIUM 80 MG: 80 TABLET, FILM COATED ORAL at 21:23

## 2023-08-13 RX ADMIN — OXYCODONE HYDROCHLORIDE AND ACETAMINOPHEN 1 TABLET: 5; 325 TABLET ORAL at 02:11

## 2023-08-13 RX ADMIN — SODIUM CHLORIDE, PRESERVATIVE FREE 10 ML: 5 INJECTION INTRAVENOUS at 21:25

## 2023-08-13 RX ADMIN — SILVER SULFADIAZINE: 10 CREAM TOPICAL at 21:26

## 2023-08-13 RX ADMIN — ALBUMIN (HUMAN) 25 G: 0.25 INJECTION, SOLUTION INTRAVENOUS at 05:30

## 2023-08-13 RX ADMIN — PREGABALIN 50 MG: 25 CAPSULE ORAL at 02:10

## 2023-08-13 RX ADMIN — OXYCODONE HYDROCHLORIDE AND ACETAMINOPHEN 500 MG: 500 TABLET ORAL at 08:52

## 2023-08-13 RX ADMIN — DOCUSATE SODIUM 100 MG: 100 CAPSULE, LIQUID FILLED ORAL at 08:53

## 2023-08-13 RX ADMIN — CARVEDILOL 3.12 MG: 3.12 TABLET, FILM COATED ORAL at 08:52

## 2023-08-13 ASSESSMENT — PAIN DESCRIPTION - LOCATION
LOCATION: BACK
LOCATION: BACK

## 2023-08-13 ASSESSMENT — PAIN DESCRIPTION - ORIENTATION: ORIENTATION: MID

## 2023-08-13 ASSESSMENT — PAIN SCALES - GENERAL
PAINLEVEL_OUTOF10: 0
PAINLEVEL_OUTOF10: 7
PAINLEVEL_OUTOF10: 0
PAINLEVEL_OUTOF10: 7
PAINLEVEL_OUTOF10: 8

## 2023-08-13 ASSESSMENT — PAIN DESCRIPTION - PAIN TYPE: TYPE: CHRONIC PAIN

## 2023-08-13 ASSESSMENT — PAIN DESCRIPTION - DESCRIPTORS: DESCRIPTORS: ACHING

## 2023-08-13 NOTE — PLAN OF CARE
Problem: Pain  Goal: Verbalizes/displays adequate comfort level or baseline comfort level  Outcome: Progressing     Problem: ABCDS Injury Assessment  Goal: Absence of physical injury  Outcome: Progressing     Problem: Safety - Adult  Goal: Free from fall injury  Outcome: Progressing  Flowsheets (Taken 8/12/2023 8921)  Free From Fall Injury: Instruct family/caregiver on patient safety     Problem: Chronic Conditions and Co-morbidities  Goal: Patient's chronic conditions and co-morbidity symptoms are monitored and maintained or improved  Outcome: Progressing

## 2023-08-13 NOTE — PROGRESS NOTES
Netarsudil Dimesylate eye drop (Rhopressa) is empty ( patient-supplied), not also available at inpatient-pharmacy.

## 2023-08-13 NOTE — PLAN OF CARE
Problem: Chronic Conditions and Co-morbidities  Goal: Patient's chronic conditions and co-morbidity symptoms are monitored and maintained or improved  8/13/2023 1837 by Sara Bailon RN  Outcome: Progressing  Note:   Patient's EF (Ejection Fraction) is greater than 40%    Heart Failure Medications:  Diuretics[de-identified] Furosemide    (One of the following REQUIRED for EF </= 40%/SYSTOLIC FAILURE but MAY be used in EF% >40%/DIASTOLIC FAILURE)        ACE[de-identified] None        ARB[de-identified] None         ARNI[de-identified] None    (Beta Blockers)  NON- Evidenced Based Beta Blocker (for EF% >40%/DIASTOLIC FAILURE): None    Evidenced Based Beta Blocker::(REQUIRED for EF% <40%/SYSTOLIC FAILURE) Carvedilol- Coreg  . .................................................................................................................................................. Healthy Weight Tracking - BMI + Meds 5/21/2023 5/22/2023 5/23/2023 5/24/2023 8/11/2023 8/12/2023 8/13/2023   Weight 252 lb 4.8 oz 251 lb 249 lb 12.8 oz 250 lb 11.2 oz 249 lb 250 lb 245 lb 1.6 oz   Height 5' 5\" - - - 5' 9\" 5' 9\" -   Body Mass Index - 41.77 kg/m2 41.57 kg/m2 41.72 kg/m2 36.77 kg/m2 36.92 kg/m2 36.19 kg/m2   Some recent data might be hidden         Patient's weights and intake/output reviewed: Yes    Daily Weight log at bedside, patient/family participation in use of log: \"yes    Patient's Last Weight: 245 lbs obtained by standing scale. Difference of 5 lbs less than last documented weight.       Intake/Output Summary (Last 24 hours) at 8/13/2023 Joslyn Mitchellwolf  Last data filed at 8/13/2023 8212  Gross per 24 hour   Intake 1430 ml   Output 3050 ml   Net -1620 ml       Education Booklet Provided: yes    Comorbidities Reviewed Yes    Patient has a past medical history of Anemia, Angina, Arthritis, CAD (coronary artery disease), CHF (congestive heart failure) ([address]), CKD (chronic kidney disease) stage 3, GFR 30-59 ml/min ([address]), Clostridium difficile diarrhea, Diabetes mellitus ([address]), Diabetic

## 2023-08-13 NOTE — PROGRESS NOTES
Physician Progress Note      Raphael Marti  CSN #:                  [id number]  :                       1955  ADMIT DATE:       2023 7:35 PM  [facility] DATE:    PROVIDER #:        Chandana Pham MD          QUERY TEXT:    Pt admitted with Elevated Renal Function and has diastolic CHF documented. If   possible, please document in progress notes and discharge summary further   specificity regarding the type and acuity of CHF:      The medical record reflects the following:  Risk Factors: HTN, CAD, CKD 3, diastolic CHF  Clinical Indicators: per Nephrology notes  \"admitted with worsening renal   function and weight gain; Decompensated  Diastolic Heart Failure\", ED notes  \"ABDOMEN: Soft. Ascites present\"; Pro-BNP=2,373 pg/mL, CXR \"Stable cardiomegaly  with mild central pulmonary congestion\"  Treatment: CBC, CMP, BNP every 3rd day, CXR, Nephrology consult, Heart failure   Nurse Coordinator consult, Dietary consult  (CHF diet education), Meds- Albumin 25% IV x1, Lasix 80 mg IV, daily weights,   I/O every 8 hours  Options provided:  -- Acute on Chronic Diastolic CHF/HFpEF  -- Chronic Diastolic CHF/HFpEF  -- Other - I will add my own diagnosis  -- Disagree - Not applicable / Not valid  -- Disagree - Clinically unable to determine / Unknown  -- Refer to Clinical Documentation Reviewer    PROVIDER RESPONSE TEXT:    This patient is in acute on chronic diastolic CHF/HFpEF.     Query created by: Shalonda Campbell on 2023 8:48 PM      Electronically signed by:  Chandana Pham MD 2023 6:03 AM

## 2023-08-13 NOTE — CARE COORDINATION
Case Management Assessment  Initial Evaluation    Date/Time of Evaluation: 8/13/2023 3:21 PM  Assessment Completed by: Marielos Richard RN    If patient is discharged prior to next notation, then this note serves as note for discharge by case management. Patient Name: Arian Lakhani                   YOB: 1955  Diagnosis: Azotemia [R79.89]  Acute renal failure superimposed on chronic kidney disease, unspecified CKD stage, unspecified acute renal failure type ([address]) [N17.9, N18.9]  Type 2 diabetes mellitus with hyperglycemia, unspecified whether long term insulin use ([address]) [E11.65]                   Date / Time: 8/11/2023  7:35 PM    Patient Admission Status: Inpatient   Readmission Risk (Low < 19, Mod (19-27), High > 27): Readmission Risk Score: 26.6    Current PCP: Benito Fu MD  PCP verified by CM? Yes    Chart Reviewed: Yes      History Provided by: Patient  Patient Orientation: Alert and Oriented, Person, Place, Situation    Patient Cognition: Alert    Hospitalization in the last 30 days (Readmission):  No    If yes, Readmission Assessment in  Navigator will be completed. Advance Directives:      Code Status: Limited   Patient's Primary Decision Maker is: Legal Next of Kin    [phone]    Discharge Planning:    Patient lives with:  Other (Comment) Type of Home: Long-Term Care  Primary Care Giver:    Patient Support Systems include: [employer]   Current Financial resources:    Current community resources:    Current services prior to admission: Oxygen Therapy            Current DME:              Type of Home Care services:  None    ADLS  Prior functional level: Assistance with the following:, Cooking, Housework, Shopping  Current functional level: Assistance with the following:, Cooking, Housework, Shopping    PT AM-PAC:   /24  OT AM-PAC:   /24    Family can provide assistance at DC: No  Would you like Case Management to

## 2023-08-13 NOTE — PLAN OF CARE
Problem: Chronic Conditions and Co-morbidities  Goal: Patient's chronic conditions and co-morbidity symptoms are monitored and maintained or improved  8/13/2023 0511 by Randy Walker RN  Outcome: Progressing     Patient's EF (Ejection Fraction) is greater than 40%    Heart Failure Medications:  Diuretics[de-identified] None    (One of the following REQUIRED for EF </= 40%/SYSTOLIC FAILURE but MAY be used in EF% >40%/DIASTOLIC FAILURE)        ACE[de-identified] None        ARB[de-identified] None         ARNI[de-identified] None    (Beta Blockers)  NON- Evidenced Based Beta Blocker (for EF% >40%/DIASTOLIC FAILURE): None    Evidenced Based Beta Blocker::(REQUIRED for EF% <40%/SYSTOLIC FAILURE) Carvedilol- Coreg  . .................................................................................................................................................. Healthy Weight Tracking - BMI + Meds 5/21/2023 5/22/2023 5/23/2023 5/24/2023 8/11/2023 8/12/2023 8/13/2023   Weight 252 lb 4.8 oz 251 lb 249 lb 12.8 oz 250 lb 11.2 oz 249 lb 250 lb 245 lb 1.6 oz   Height 5' 5\" - - - 5' 9\" 5' 9\" -   Body Mass Index - 41.77 kg/m2 41.57 kg/m2 41.72 kg/m2 36.77 kg/m2 36.92 kg/m2 36.19 kg/m2   Some recent data might be hidden         Patient's weights and intake/output reviewed: Yes    Daily Weight log at bedside, patient/family participation in use of log: \"yes    Patient's Last Weight: 345 lbs obtained by standing scale. Difference of 5 lbs less than last documented weight.       Intake/Output Summary (Last 24 hours) at 8/13/2023 0511  Last data filed at 8/13/2023 0502  Gross per 24 hour   Intake 530 ml   Output 1250 ml   Net -720 ml       Education Booklet Provided: yes    Comorbidities Reviewed Yes    Patient has a past medical history of Anemia, Angina, Arthritis, CAD (coronary artery disease), CHF (congestive heart failure) ([address]), CKD (chronic kidney disease) stage 3, GFR 30-59 ml/min ([address]), Clostridium difficile diarrhea, Diabetes mellitus ([address]), Diabetic neuropathy

## 2023-08-14 ENCOUNTER — APPOINTMENT (OUTPATIENT)
Dept: INTERVENTIONAL RADIOLOGY/VASCULAR | Age: 68
End: 2023-08-14
Payer: MEDICARE

## 2023-08-14 LAB
ALBUMIN SERPL-MCNC: 4.8 G/DL (ref 3.4–5)
ANION GAP SERPL CALCULATED.3IONS-SCNC: 14 MMOL/L (ref 3–16)
BASOPHILS # BLD: 0 K/UL (ref 0–0.2)
BASOPHILS NFR BLD: 0.5 %
BUN SERPL-MCNC: 125 MG/DL (ref 7–20)
CALCIUM SERPL-MCNC: 10.2 MG/DL (ref 8.3–10.6)
CHLORIDE SERPL-SCNC: 94 MMOL/L (ref 99–110)
CO2 SERPL-SCNC: 28 MMOL/L (ref 21–32)
CREAT SERPL-MCNC: 1.8 MG/DL (ref 0.8–1.3)
DEPRECATED RDW RBC AUTO: 19.2 % (ref 12.4–15.4)
EOSINOPHIL # BLD: 0.2 K/UL (ref 0–0.6)
EOSINOPHIL NFR BLD: 3.2 %
GFR SERPLBLD CREATININE-BSD FMLA CKD-EPI: 40 ML/MIN/{1.73_M2}
GLUCOSE BLD-MCNC: 147 MG/DL (ref 70–99)
GLUCOSE BLD-MCNC: 162 MG/DL (ref 70–99)
GLUCOSE BLD-MCNC: 178 MG/DL (ref 70–99)
GLUCOSE BLD-MCNC: 323 MG/DL (ref 70–99)
GLUCOSE SERPL-MCNC: 219 MG/DL (ref 70–99)
HCT VFR BLD AUTO: 35.9 % (ref 40.5–52.5)
HGB BLD-MCNC: 12.1 G/DL (ref 13.5–17.5)
INR PPP: 1.4 (ref 0.84–1.16)
LYMPHOCYTES # BLD: 0.8 K/UL (ref 1–5.1)
LYMPHOCYTES NFR BLD: 13.4 %
MAGNESIUM SERPL-MCNC: 2.8 MG/DL (ref 1.8–2.4)
MCH RBC QN AUTO: 28 PG (ref 26–34)
MCHC RBC AUTO-ENTMCNC: 33.6 G/DL (ref 31–36)
MCV RBC AUTO: 83.3 FL (ref 80–100)
MONOCYTES # BLD: 0.5 K/UL (ref 0–1.3)
MONOCYTES NFR BLD: 9 %
NEUTROPHILS # BLD: 4.2 K/UL (ref 1.7–7.7)
NEUTROPHILS NFR BLD: 73.9 %
NT-PROBNP SERPL-MCNC: 2292 PG/ML (ref 0–124)
PERFORMED ON: ABNORMAL
PHOSPHATE SERPL-MCNC: 3.8 MG/DL (ref 2.5–4.9)
PLATELET # BLD AUTO: 125 K/UL (ref 135–450)
PMV BLD AUTO: 9.1 FL (ref 5–10.5)
POTASSIUM SERPL-SCNC: 3.8 MMOL/L (ref 3.5–5.1)
PROTHROMBIN TIME: 17.1 SEC (ref 11.5–14.8)
RBC # BLD AUTO: 4.31 M/UL (ref 4.2–5.9)
SODIUM SERPL-SCNC: 136 MMOL/L (ref 136–145)
WBC # BLD AUTO: 5.7 K/UL (ref 4–11)

## 2023-08-14 PROCEDURE — 2580000003 HC RX 258: Performed by: NURSE PRACTITIONER

## 2023-08-14 PROCEDURE — 6360000002 HC RX W HCPCS: Performed by: INTERNAL MEDICINE

## 2023-08-14 PROCEDURE — P9047 ALBUMIN (HUMAN), 25%, 50ML: HCPCS | Performed by: INTERNAL MEDICINE

## 2023-08-14 PROCEDURE — 94761 N-INVAS EAR/PLS OXIMETRY MLT: CPT

## 2023-08-14 PROCEDURE — 2700000000 HC OXYGEN THERAPY PER DAY

## 2023-08-14 PROCEDURE — 85610 PROTHROMBIN TIME: CPT

## 2023-08-14 PROCEDURE — 85025 COMPLETE CBC W/AUTO DIFF WBC: CPT

## 2023-08-14 PROCEDURE — 6370000000 HC RX 637 (ALT 250 FOR IP): Performed by: INTERNAL MEDICINE

## 2023-08-14 PROCEDURE — 1200000000 HC SEMI PRIVATE

## 2023-08-14 PROCEDURE — 83735 ASSAY OF MAGNESIUM: CPT

## 2023-08-14 PROCEDURE — 83880 ASSAY OF NATRIURETIC PEPTIDE: CPT

## 2023-08-14 PROCEDURE — 2709999900 IR US GUIDED PARACENTESIS

## 2023-08-14 PROCEDURE — 80069 RENAL FUNCTION PANEL: CPT

## 2023-08-14 PROCEDURE — 2709999900 US ABDOMEN LIMITED

## 2023-08-14 PROCEDURE — 6370000000 HC RX 637 (ALT 250 FOR IP): Performed by: NURSE PRACTITIONER

## 2023-08-14 PROCEDURE — 36415 COLL VENOUS BLD VENIPUNCTURE: CPT

## 2023-08-14 RX ORDER — DORZOLAMIDE HCL 20 MG/ML
1 SOLUTION/ DROPS OPHTHALMIC 2 TIMES DAILY
Status: DISCONTINUED | OUTPATIENT
Start: 2023-08-14 | End: 2023-08-14

## 2023-08-14 RX ORDER — LINACLOTIDE 290 UG/1
290 CAPSULE, GELATIN COATED ORAL
Qty: 6 CAPSULE | Refills: 0 | Status: SHIPPED | OUTPATIENT
Start: 2023-08-14 | End: 2023-08-20

## 2023-08-14 RX ORDER — TIMOLOL MALEATE 5 MG/ML
1 SOLUTION/ DROPS OPHTHALMIC 2 TIMES DAILY
Status: DISCONTINUED | OUTPATIENT
Start: 2023-08-14 | End: 2023-08-17 | Stop reason: HOSPADM

## 2023-08-14 RX ORDER — BRIMONIDINE TARTRATE 2 MG/ML
1 SOLUTION/ DROPS OPHTHALMIC 2 TIMES DAILY
Status: DISCONTINUED | OUTPATIENT
Start: 2023-08-14 | End: 2023-08-17 | Stop reason: HOSPADM

## 2023-08-14 RX ORDER — DORZOLAMIDE HCL 20 MG/ML
1 SOLUTION/ DROPS OPHTHALMIC 2 TIMES DAILY
Status: DISCONTINUED | OUTPATIENT
Start: 2023-08-14 | End: 2023-08-17 | Stop reason: HOSPADM

## 2023-08-14 RX ADMIN — SODIUM CHLORIDE, PRESERVATIVE FREE 5 ML: 5 INJECTION INTRAVENOUS at 08:25

## 2023-08-14 RX ADMIN — CARVEDILOL 3.12 MG: 3.12 TABLET, FILM COATED ORAL at 08:17

## 2023-08-14 RX ADMIN — SILVER SULFADIAZINE: 10 CREAM TOPICAL at 21:35

## 2023-08-14 RX ADMIN — TIMOLOL MALEATE 1 DROP: 5 SOLUTION OPHTHALMIC at 21:50

## 2023-08-14 RX ADMIN — GUAIFENESIN 600 MG: 600 TABLET, EXTENDED RELEASE ORAL at 08:16

## 2023-08-14 RX ADMIN — INSULIN LISPRO 4 UNITS: 100 INJECTION, SOLUTION INTRAVENOUS; SUBCUTANEOUS at 21:32

## 2023-08-14 RX ADMIN — SALINE NASAL SPRAY 1 SPRAY: 1.5 SOLUTION NASAL at 10:50

## 2023-08-14 RX ADMIN — ENOXAPARIN SODIUM 30 MG: 100 INJECTION SUBCUTANEOUS at 21:31

## 2023-08-14 RX ADMIN — BRIMONIDINE TARTRATE AND TIMOLOL MALEATE 1 DROP: 2; 5 SOLUTION OPHTHALMIC at 08:25

## 2023-08-14 RX ADMIN — FUROSEMIDE 80 MG: 10 INJECTION, SOLUTION INTRAMUSCULAR; INTRAVENOUS at 08:17

## 2023-08-14 RX ADMIN — NALOXEGOL OXALATE 25 MG: 25 TABLET, FILM COATED ORAL at 05:53

## 2023-08-14 RX ADMIN — INSULIN GLARGINE 40 UNITS: 100 INJECTION, SOLUTION SUBCUTANEOUS at 21:31

## 2023-08-14 RX ADMIN — PREGABALIN 50 MG: 25 CAPSULE ORAL at 23:47

## 2023-08-14 RX ADMIN — ASPIRIN 81 MG: 81 TABLET, COATED ORAL at 08:23

## 2023-08-14 RX ADMIN — TAMSULOSIN HYDROCHLORIDE 0.8 MG: 0.4 CAPSULE ORAL at 05:53

## 2023-08-14 RX ADMIN — BRIMONIDINE TARTRATE 1 DROP: 2 SOLUTION/ DROPS OPHTHALMIC at 21:50

## 2023-08-14 RX ADMIN — DOCUSATE SODIUM 100 MG: 100 CAPSULE, LIQUID FILLED ORAL at 08:21

## 2023-08-14 RX ADMIN — SILVER SULFADIAZINE: 10 CREAM TOPICAL at 12:04

## 2023-08-14 RX ADMIN — ALLOPURINOL 300 MG: 300 TABLET ORAL at 08:17

## 2023-08-14 RX ADMIN — GUAIFENESIN 600 MG: 600 TABLET, EXTENDED RELEASE ORAL at 21:31

## 2023-08-14 RX ADMIN — DOCUSATE SODIUM 100 MG: 100 CAPSULE, LIQUID FILLED ORAL at 21:31

## 2023-08-14 RX ADMIN — OXYCODONE HYDROCHLORIDE AND ACETAMINOPHEN 1 TABLET: 5; 325 TABLET ORAL at 00:00

## 2023-08-14 RX ADMIN — POTASSIUM CHLORIDE 10 MEQ: 750 TABLET, EXTENDED RELEASE ORAL at 08:16

## 2023-08-14 RX ADMIN — ALBUMIN (HUMAN) 25 G: 0.25 INJECTION, SOLUTION INTRAVENOUS at 03:24

## 2023-08-14 RX ADMIN — SODIUM CHLORIDE, PRESERVATIVE FREE 10 ML: 5 INJECTION INTRAVENOUS at 21:33

## 2023-08-14 RX ADMIN — PREGABALIN 50 MG: 25 CAPSULE ORAL at 00:01

## 2023-08-14 RX ADMIN — PREGABALIN 50 MG: 25 CAPSULE ORAL at 10:51

## 2023-08-14 RX ADMIN — OXYCODONE HYDROCHLORIDE AND ACETAMINOPHEN 500 MG: 500 TABLET ORAL at 08:17

## 2023-08-14 RX ADMIN — ACETAMINOPHEN 650 MG: 325 TABLET ORAL at 08:16

## 2023-08-14 RX ADMIN — ENOXAPARIN SODIUM 30 MG: 100 INJECTION SUBCUTANEOUS at 08:25

## 2023-08-14 RX ADMIN — DORZOLAMIDE HCL 1 DROP: 20 SOLUTION/ DROPS OPHTHALMIC at 08:25

## 2023-08-14 RX ADMIN — ATORVASTATIN CALCIUM 80 MG: 80 TABLET, FILM COATED ORAL at 21:31

## 2023-08-14 RX ADMIN — PANTOPRAZOLE SODIUM 40 MG: 40 TABLET, DELAYED RELEASE ORAL at 05:53

## 2023-08-14 RX ADMIN — FUROSEMIDE 80 MG: 10 INJECTION, SOLUTION INTRAMUSCULAR; INTRAVENOUS at 19:05

## 2023-08-14 RX ADMIN — CARVEDILOL 3.12 MG: 3.12 TABLET, FILM COATED ORAL at 19:05

## 2023-08-14 RX ADMIN — FERROUS SULFATE TAB 325 MG (65 MG ELEMENTAL FE) 325 MG: 325 (65 FE) TAB at 08:24

## 2023-08-14 RX ADMIN — FERROUS SULFATE TAB 325 MG (65 MG ELEMENTAL FE) 325 MG: 325 (65 FE) TAB at 21:31

## 2023-08-14 ASSESSMENT — PAIN DESCRIPTION - ONSET
ONSET: ON-GOING
ONSET: ON-GOING

## 2023-08-14 ASSESSMENT — PAIN SCALES - GENERAL
PAINLEVEL_OUTOF10: 7
PAINLEVEL_OUTOF10: 9
PAINLEVEL_OUTOF10: 7
PAINLEVEL_OUTOF10: 4
PAINLEVEL_OUTOF10: 7

## 2023-08-14 ASSESSMENT — PAIN DESCRIPTION - ORIENTATION
ORIENTATION: LOWER

## 2023-08-14 ASSESSMENT — PAIN DESCRIPTION - FREQUENCY
FREQUENCY: CONTINUOUS
FREQUENCY: CONTINUOUS

## 2023-08-14 ASSESSMENT — PAIN DESCRIPTION - PAIN TYPE
TYPE: CHRONIC PAIN
TYPE: CHRONIC PAIN

## 2023-08-14 ASSESSMENT — PAIN DESCRIPTION - DESCRIPTORS
DESCRIPTORS: PINS AND NEEDLES;DISCOMFORT
DESCRIPTORS: ACHING;DISCOMFORT
DESCRIPTORS: ACHING;DISCOMFORT

## 2023-08-14 ASSESSMENT — PAIN DESCRIPTION - LOCATION
LOCATION: BACK;NECK

## 2023-08-14 NOTE — PROGRESS NOTES
US evaluation of the peritoneal space demonstrated no ascites. No paracentesis was performed. Discussed with the patient and RN.        Christ Belcher DO  8/14/2023, 4:50 PM  Interventional Radiology  [phone] ([phone])

## 2023-08-14 NOTE — PROGRESS NOTES
Hospitalist Progress Note    CC: Azotemia    Name:  Dorota Hobson /Age/Sex: 1955  (70 y.o. male)   MRN & CSN:  [id number] & [id number] Encounter Date/Time: 2023 1:56 PM EDT   Location:  0217/0217-01 PCP: MD Felix Nguyen MD         Hospital course:  70 y.o. male with a significant past medical history of hypertension, hyperlipidemia, type 2 diabetes, chronic diastolic heart failure, COPD, CAD, and CKD who presents to [facility] emergency [facility] from [facility] with reports of elevated BUN on routine blood work. He notes recently has been having mild symptoms of fatigue and weakness at nursing home which prompted blood work. Blood work from today's encounter is not available for review but repeat blood work was performed here in the ED. Pertinent findings include sodium 131, chloride 92, , creatinine 2.1, magnesium 2.7, blood sugar 410, proBNP 2373, initial at bedtime troponin 66 with repeat of 65. Nephrology was consulted, recommended holding diuretic therapy, slow MIVF, repeat lab work in the morning. Patient denies any other symptoms of the chest pain, dyspnea, fever, or poor urinary output. Chest x-ray tonight showed stable chronic cardiomegaly with stable central pulmonary congestion. Patient received following cc bolus in the emergency department. PT evaluated by nephrology. He has had multiple admissions for fluid overload and his renal function is very sensitive to diuretics.     Admit date: 2023  Days in hospital:  Hospital Day: 4  Status:  Inpatient       24 Hour Events: some nosebleed overnight    Subjective:  pt is asking for nasal spray for nosebleed that he is getting from [address] [place] [address] also still constipated and he states that the only thing that works is linzess - will need help obtaining a supply from his nursing facility as this is not carried inpatient at [facility]  HTN    BP stable  AYO is neg for thrombus   please obtain EP eval for ILR     History of tobacco  on nicotine patch sulfate  325 mg Oral BID    guaiFENesin  600 mg Oral BID    insulin glargine  40 Units SubCUTAneous Nightly    [Held by provider] magnesium oxide  400 mg Oral BID    pantoprazole  40 mg Oral QAM AC    potassium chloride  10 mEq Oral Daily    pregabalin  50 mg Oral Q12H    Netarsudil Dimesylate  1 drop Left Eye Nightly    tamsulosin  0.8 mg Oral Daily    sodium chloride flush  5-40 mL IntraVENous 2 times per day    insulin lispro  0-16 Units SubCUTAneous TID WC    insulin lispro  0-4 Units SubCUTAneous Nightly    silver sulfADIAZINE   Topical BID     PRN Meds: oxyCODONE-acetaminophen, cyclobenzaprine, polyvinyl alcohol, sodium chloride flush, sodium chloride, ondansetron **OR** ondansetron, acetaminophen **OR** acetaminophen, glucose, dextrose bolus **OR** dextrose bolus, glucagon (rDNA), dextrose     Labs:  Personally reviewed and interpreted for clinical significance. CBC:   Recent Labs     08/12/23 0442 08/13/23 0440 08/14/23  0506   WBC 6.3 6.3 5.7   HGB 12.0* 12.2* 12.1*   HCT 36.4* 36.3* 35.9*   MCV 83.1 83.3 83.3   * 132* 125*       BMP, Mag, Phos:    Recent Labs     08/12/23 0442 08/13/23 0440 08/14/23  0506   * 138 136   K 3.8 4.1 3.8   CL 96* 97* 94*   CO2 29 29 28   * 126* 125*   CREATININE 1.8* 1.7* 1.8*   CALCIUM 9.5 9.8 10.2   MG 2.80* 2.90* 2.80*   PHOS  --  3.9 3.8       Pro-BNP, Trop:    Recent Labs     08/11/23 2011 08/11/23 2100 08/14/23  0506   PROBNP 2,373*  --  2,292*   TROPHS 66* 65*  --        LFTs:    Recent Labs     08/11/23 2011   AST 13*   ALT 11   BILITOT 0.6   ALKPHOS 125       PT/INR, Lactic acid, TSH:  No results for input(s): INR, LACTA, TSH in the last 72 hours.   HgbA1c:    Recent Labs     08/12/23  0442   LABA1C 9.2       Glucose trend:  No results found for: GLU    UA:No results for input(s): NITRITE, COLORU, PHUR, LABCAST, WBCUA, RBCUA, MUCUS, TRICHOMONAS, YEAST, BACTERIA, CLARITYU, SPECGRAV, LEUKOCYTESUR, UROBILINOGEN, BILIRUBINUR, BLOODU, GLUCOSEU,

## 2023-08-14 NOTE — PLAN OF CARE
Problem: Chronic Conditions and Co-morbidities  Goal: Patient's chronic conditions and co-morbidity symptoms are monitored and maintained or improved  8/13/2023 0511 by Jeannette Burnett RN  Outcome: Progressing   Diabetic education reviewed with patient. Patient's EF (Ejection Fraction) is greater than 40%    Heart Failure Medications:  Diuretics[de-identified] Lasix    (One of the following REQUIRED for EF </= 40%/SYSTOLIC FAILURE but MAY be used in EF% >40%/DIASTOLIC FAILURE)        ACE[de-identified] None        ARB[de-identified] None         ARNI[de-identified] None    (Beta Blockers)  NON- Evidenced Based Beta Blocker (for EF% >40%/DIASTOLIC FAILURE): Coreg    Evidenced Based Beta Blocker::(REQUIRED for EF% <40%/SYSTOLIC FAILURE) Carvedilol- Coreg  . .................................................................................................................................................. Healthy Weight Tracking - BMI + Meds 5/22/2023 5/23/2023 5/24/2023 8/11/2023 8/12/2023 8/13/2023 8/14/2023   Weight 251 lb 249 lb 12.8 oz 250 lb 11.2 oz 249 lb 250 lb 245 lb 1.6 oz 242 lb 12.8 oz   Height - - - 5' 9\" 5' 9\" - -   Body Mass Index 41.77 kg/m2 41.57 kg/m2 41.72 kg/m2 36.77 kg/m2 36.92 kg/m2 36.19 kg/m2 35.86 kg/m2   Some recent data might be hidden         Patient's weights and intake/output reviewed: Yes    Daily Weight log at bedside, patient/family participation in use of log: \"yes    Patient's Last Weight: 242 lb 12.8 oz obtained by standing scale. Difference of 2.5 lbs less than last documented weight.       Intake/Output Summary (Last 24 hours) at 8/14/2023 1537  Last data filed at 8/14/2023 0825  Gross per 24 hour   Intake 765 ml   Output 2400 ml   Net -1635 ml       Education Booklet Provided: yes    Comorbidities Reviewed Yes    Patient has a past medical history of Anemia, Angina, Arthritis, CAD (coronary artery disease), CHF (congestive heart failure) ([address]), CKD (chronic kidney disease) stage 3, GFR 30-59 ml/min ([address]), Clostridium difficile diarrhea, Diabetes mellitus ([address]), Diabetic neuropathy ([address]), Disease of blood and blood forming organ, Dizziness, GERD (gastroesophageal reflux disease), Headache, Hearing loss, Hyperlipidemia, Hypertension, Kidney stone, Refusal of blood transfusions as patient is [employer], Sleep apnea, Tinnitus, Venous ulcer ([address]), and Wound, open. >>For CHF and Comorbidity documentation on Education Time and Topics, please see Education Tab    Progressive Mobility Assessment:  What is this patient's Current Level of Mobility?: Requires Bed Rest  How was this patient Mobilized today?: Edge of Bed, Up to Chair, and Bedside Commode, ambulated 10 ft                 With Whom? Nurse, PCA, and Self                 Level of Difficulty/Assistance: 1x Assist     Pt up in chair at this time on  3 L O2. Pt with complaints of shortness of breath. Pt with pitting lower extremity edema.      Patient and/or Family's stated Goal of Care this Admission: reduce shortness of breath, increase activity tolerance, better understand heart failure and disease management, be more comfortable, and reduce lower extremity edema prior to discharge

## 2023-08-14 NOTE — PLAN OF CARE
Problem: Pain  Goal: Verbalizes/displays adequate comfort level or baseline comfort level  Outcome: Progressing     Problem: ABCDS Injury Assessment  Goal: Absence of physical injury  Outcome: Progressing     Problem: Safety - Adult  Goal: Free from fall injury  Outcome: Progressing     Problem: Chronic Conditions and Co-morbidities  Goal: Patient's chronic conditions and co-morbidity symptoms are monitored and maintained or improved  8/14/2023 0539 by Anders Coburn RN  Outcome: Progressing       Patient's EF (Ejection Fraction) is greater than 40%    Heart Failure Medications:  Diuretics[de-identified] Furosemide    (One of the following REQUIRED for EF </= 40%/SYSTOLIC FAILURE but MAY be used in EF% >40%/DIASTOLIC FAILURE)        ACE[de-identified] None        ARB[de-identified] None         ARNI[de-identified] None    (Beta Blockers)  NON- Evidenced Based Beta Blocker (for EF% >40%/DIASTOLIC FAILURE): None    Evidenced Based Beta Blocker::(REQUIRED for EF% <40%/SYSTOLIC FAILURE) Carvedilol- Coreg  . .................................................................................................................................................. Healthy Weight Tracking - BMI + Meds 5/22/2023 5/23/2023 5/24/2023 8/11/2023 8/12/2023 8/13/2023 8/14/2023   Weight 251 lb 249 lb 12.8 oz 250 lb 11.2 oz 249 lb 250 lb 245 lb 1.6 oz 242 lb 12.8 oz   Height - - - 5' 9\" 5' 9\" - -   Body Mass Index 41.77 kg/m2 41.57 kg/m2 41.72 kg/m2 36.77 kg/m2 36.92 kg/m2 36.19 kg/m2 35.86 kg/m2   Some recent data might be hidden         Patient's weights and intake/output reviewed: Yes    Daily Weight log at bedside, patient/family participation in use of log: \"yes    Patient's Last Weight: 242 lbs obtained by standing scale. Difference of 3 lbs less than last documented weight.       Intake/Output Summary (Last 24 hours) at 8/14/2023 0592  Last data filed at 8/14/2023 0204  Gross per 24 hour   Intake 1380 ml   Output 3300 ml   Net -1920 ml       Education Booklet Provided:

## 2023-08-14 NOTE — PROGRESS NOTES
Nursing Shift Summary 7 AM- 7 PM    Shift Events  Paracentesis cancelled due to no abd fluid detected. BM this shift. Reports improved abd distention    Vitals  BP:Runs on the lower side. MAP >60. Asymptomatic  HR: Sinus deshawn HR 50-60  Resp/SpO2: Weaned to 1.5 L  Temp: Afebrile  Pain: Severe headache this morning. Resolved.      Neuro  OX:4     I/O & Drains &   Patient declines to use hat/ urinal          Bedside Shift Handoff given to: Олег Palacio RN   07:21 PM  Bishop Angelique RN

## 2023-08-14 NOTE — CONSULTS
[facility] Jessica Max 1955    History:  Past Medical History:   Diagnosis Date    Anemia     Angina     Arthritis     CAD (coronary artery disease)     CHF (congestive heart failure) ([facility])     CKD (chronic kidney disease) stage 3, GFR 30-59 ml/min ([facility])     Clostridium difficile diarrhea 3/16/12; 2/29/12    positive stool toxin    Diabetes mellitus ([address])     Diabetic neuropathy ([address])     feet and legs    Disease of blood and blood forming organ     Dizziness     When he moves his head/ loses his balance    GERD (gastroesophageal reflux disease)     gastric ulcer    Headache     Hearing loss     Hyperlipidemia     Hypertension     Kidney stone 2002    Refusal of blood transfusions as patient is [employer]     Sleep apnea     Tinnitus     Venous ulcer ([address])     LLE    Wound, open 1/13/2012       ECHO:   8/26/22   EF 50-55%  HgA1C: 8/12/23   9.2  Iron Saturation: 4/24/22   15  Ferritin: 4/24/18    328.2    ACE/ARB/ARNI:   BB: Carvedilol 3.125mg BID  Spironolactone:  Ferrous Sulfate 325mg BID  SGLT2:    Last Hospital Admission:  5/19/23  acute on chronic Heart Failure  Discharge plans: Return [facility]    Advanced Directives: patient has advance directives scanned in the chart     Chart review completed. Patient a 70year old male, admitted for azotemia. Hospital day 3, currently on A2 level of care. Pt familiar to writer from several previous CHF admissions. Pt admitted from [facility] where he lives LTC, for elevated BUN and fatigue/weakness. IV lasix and albumin in place. Nephrology following as renal function is sensitive to diuretics. Plans are for patient to return to [facility] when medically stable. There they will monitor for s/s of CHF including daily weights. They will also assist with diet and fluid restrictions.      Patient recent weights and intake/output reviewed:    Patient Vitals for the past 96 hrs (Last 3

## 2023-08-14 NOTE — CARE COORDINATION
Spoke with MD who states patient is getting a paracentesis today. She states patient will discharge back to his facility with a 7 day script for Linzess and asking where to send script to. Patient is a long term resident at [facility]. Placed call to Dae with hospital outpatient pharmacy to notify him of the above. He states to have MD send them the script and they will order it today and have it on hand tomorrow. Message sent to MD who will send script to them today. No other barriers from CM standpoint. Patient will just need for CM to arrange for ambulance transport back to [facility] at discharge.

## 2023-08-15 LAB
ANION GAP SERPL CALCULATED.3IONS-SCNC: 13 MMOL/L (ref 3–16)
BUN SERPL-MCNC: 128 MG/DL (ref 7–20)
CALCIUM SERPL-MCNC: 10 MG/DL (ref 8.3–10.6)
CHLORIDE SERPL-SCNC: 93 MMOL/L (ref 99–110)
CO2 SERPL-SCNC: 30 MMOL/L (ref 21–32)
CREAT SERPL-MCNC: 1.9 MG/DL (ref 0.8–1.3)
GFR SERPLBLD CREATININE-BSD FMLA CKD-EPI: 38 ML/MIN/{1.73_M2}
GLUCOSE BLD-MCNC: 222 MG/DL (ref 70–99)
GLUCOSE BLD-MCNC: 265 MG/DL (ref 70–99)
GLUCOSE BLD-MCNC: 348 MG/DL (ref 70–99)
GLUCOSE BLD-MCNC: 372 MG/DL (ref 70–99)
GLUCOSE SERPL-MCNC: 256 MG/DL (ref 70–99)
MAGNESIUM SERPL-MCNC: 2.5 MG/DL (ref 1.8–2.4)
PERFORMED ON: ABNORMAL
POTASSIUM SERPL-SCNC: 4 MMOL/L (ref 3.5–5.1)
SODIUM SERPL-SCNC: 136 MMOL/L (ref 136–145)

## 2023-08-15 PROCEDURE — 6360000002 HC RX W HCPCS: Performed by: INTERNAL MEDICINE

## 2023-08-15 PROCEDURE — 1200000000 HC SEMI PRIVATE

## 2023-08-15 PROCEDURE — 80048 BASIC METABOLIC PNL TOTAL CA: CPT

## 2023-08-15 PROCEDURE — 83735 ASSAY OF MAGNESIUM: CPT

## 2023-08-15 PROCEDURE — 2580000003 HC RX 258: Performed by: NURSE PRACTITIONER

## 2023-08-15 PROCEDURE — 6370000000 HC RX 637 (ALT 250 FOR IP): Performed by: NURSE PRACTITIONER

## 2023-08-15 PROCEDURE — 6370000000 HC RX 637 (ALT 250 FOR IP): Performed by: INTERNAL MEDICINE

## 2023-08-15 PROCEDURE — 36415 COLL VENOUS BLD VENIPUNCTURE: CPT

## 2023-08-15 RX ADMIN — FUROSEMIDE 80 MG: 10 INJECTION, SOLUTION INTRAMUSCULAR; INTRAVENOUS at 17:49

## 2023-08-15 RX ADMIN — ENOXAPARIN SODIUM 30 MG: 100 INJECTION SUBCUTANEOUS at 10:19

## 2023-08-15 RX ADMIN — FERROUS SULFATE TAB 325 MG (65 MG ELEMENTAL FE) 325 MG: 325 (65 FE) TAB at 20:50

## 2023-08-15 RX ADMIN — ATORVASTATIN CALCIUM 80 MG: 80 TABLET, FILM COATED ORAL at 20:49

## 2023-08-15 RX ADMIN — INSULIN GLARGINE 40 UNITS: 100 INJECTION, SOLUTION SUBCUTANEOUS at 20:51

## 2023-08-15 RX ADMIN — INSULIN LISPRO 8 UNITS: 100 INJECTION, SOLUTION INTRAVENOUS; SUBCUTANEOUS at 10:45

## 2023-08-15 RX ADMIN — DOCUSATE SODIUM 100 MG: 100 CAPSULE, LIQUID FILLED ORAL at 10:18

## 2023-08-15 RX ADMIN — TIMOLOL MALEATE 1 DROP: 5 SOLUTION OPHTHALMIC at 20:49

## 2023-08-15 RX ADMIN — OXYCODONE HYDROCHLORIDE AND ACETAMINOPHEN 1 TABLET: 5; 325 TABLET ORAL at 12:34

## 2023-08-15 RX ADMIN — OXYCODONE HYDROCHLORIDE AND ACETAMINOPHEN 1 TABLET: 5; 325 TABLET ORAL at 04:37

## 2023-08-15 RX ADMIN — ASPIRIN 81 MG: 81 TABLET, COATED ORAL at 10:18

## 2023-08-15 RX ADMIN — SILVER SULFADIAZINE: 10 CREAM TOPICAL at 10:24

## 2023-08-15 RX ADMIN — TAMSULOSIN HYDROCHLORIDE 0.8 MG: 0.4 CAPSULE ORAL at 06:57

## 2023-08-15 RX ADMIN — SODIUM CHLORIDE, PRESERVATIVE FREE 5 ML: 5 INJECTION INTRAVENOUS at 10:30

## 2023-08-15 RX ADMIN — BRIMONIDINE TARTRATE 1 DROP: 2 SOLUTION/ DROPS OPHTHALMIC at 20:49

## 2023-08-15 RX ADMIN — PREGABALIN 50 MG: 25 CAPSULE ORAL at 23:18

## 2023-08-15 RX ADMIN — CARVEDILOL 3.12 MG: 3.12 TABLET, FILM COATED ORAL at 17:49

## 2023-08-15 RX ADMIN — TIMOLOL MALEATE 1 DROP: 5 SOLUTION OPHTHALMIC at 10:41

## 2023-08-15 RX ADMIN — GUAIFENESIN 600 MG: 600 TABLET, EXTENDED RELEASE ORAL at 20:50

## 2023-08-15 RX ADMIN — PANTOPRAZOLE SODIUM 40 MG: 40 TABLET, DELAYED RELEASE ORAL at 06:57

## 2023-08-15 RX ADMIN — INSULIN LISPRO 4 UNITS: 100 INJECTION, SOLUTION INTRAVENOUS; SUBCUTANEOUS at 20:51

## 2023-08-15 RX ADMIN — SILVER SULFADIAZINE: 10 CREAM TOPICAL at 20:52

## 2023-08-15 RX ADMIN — NALOXEGOL OXALATE 25 MG: 25 TABLET, FILM COATED ORAL at 06:57

## 2023-08-15 RX ADMIN — ALLOPURINOL 300 MG: 300 TABLET ORAL at 10:18

## 2023-08-15 RX ADMIN — CARVEDILOL 3.12 MG: 3.12 TABLET, FILM COATED ORAL at 10:19

## 2023-08-15 RX ADMIN — BRIMONIDINE TARTRATE 1 DROP: 2 SOLUTION/ DROPS OPHTHALMIC at 10:41

## 2023-08-15 RX ADMIN — FUROSEMIDE 80 MG: 10 INJECTION, SOLUTION INTRAMUSCULAR; INTRAVENOUS at 10:17

## 2023-08-15 RX ADMIN — OXYCODONE HYDROCHLORIDE AND ACETAMINOPHEN 500 MG: 500 TABLET ORAL at 10:30

## 2023-08-15 RX ADMIN — SODIUM CHLORIDE, PRESERVATIVE FREE 10 ML: 5 INJECTION INTRAVENOUS at 20:52

## 2023-08-15 RX ADMIN — OXYCODONE HYDROCHLORIDE AND ACETAMINOPHEN 1 TABLET: 5; 325 TABLET ORAL at 20:50

## 2023-08-15 RX ADMIN — FERROUS SULFATE TAB 325 MG (65 MG ELEMENTAL FE) 325 MG: 325 (65 FE) TAB at 10:17

## 2023-08-15 RX ADMIN — PREGABALIN 50 MG: 25 CAPSULE ORAL at 10:17

## 2023-08-15 RX ADMIN — GUAIFENESIN 600 MG: 600 TABLET, EXTENDED RELEASE ORAL at 10:18

## 2023-08-15 RX ADMIN — POTASSIUM CHLORIDE 10 MEQ: 750 TABLET, EXTENDED RELEASE ORAL at 10:19

## 2023-08-15 RX ADMIN — INSULIN LISPRO 4 UNITS: 100 INJECTION, SOLUTION INTRAVENOUS; SUBCUTANEOUS at 13:25

## 2023-08-15 RX ADMIN — DOCUSATE SODIUM 100 MG: 100 CAPSULE, LIQUID FILLED ORAL at 20:49

## 2023-08-15 RX ADMIN — ENOXAPARIN SODIUM 30 MG: 100 INJECTION SUBCUTANEOUS at 20:51

## 2023-08-15 RX ADMIN — INSULIN LISPRO 16 UNITS: 100 INJECTION, SOLUTION INTRAVENOUS; SUBCUTANEOUS at 17:46

## 2023-08-15 ASSESSMENT — PAIN DESCRIPTION - LOCATION
LOCATION: BACK;NECK
LOCATION: LEG
LOCATION: BACK

## 2023-08-15 ASSESSMENT — PAIN SCALES - GENERAL
PAINLEVEL_OUTOF10: 7
PAINLEVEL_OUTOF10: 9
PAINLEVEL_OUTOF10: 7
PAINLEVEL_OUTOF10: 7

## 2023-08-15 ASSESSMENT — PAIN DESCRIPTION - ORIENTATION: ORIENTATION: MID

## 2023-08-15 ASSESSMENT — PAIN DESCRIPTION - DESCRIPTORS: DESCRIPTORS: ACHING

## 2023-08-15 NOTE — PLAN OF CARE
Problem: ABCDS Injury Assessment  Goal: Absence of physical injury  Outcome: Progressing     Problem: Chronic Conditions and Co-morbidities  Goal: Patient's chronic conditions and co-morbidity symptoms are monitored and maintained or improved  Outcome: Progressing       Patient's EF (Ejection Fraction) is greater than 40%    Heart Failure Medications:  Diuretics[de-identified] Furosemide    (One of the following REQUIRED for EF </= 40%/SYSTOLIC FAILURE but MAY be used in EF% >40%/DIASTOLIC FAILURE)        ACE[de-identified] None        ARB[de-identified] None         ARNI[de-identified] None    (Beta Blockers)  NON- Evidenced Based Beta Blocker (for EF% >40%/DIASTOLIC FAILURE): None    Evidenced Based Beta Blocker::(REQUIRED for EF% <40%/SYSTOLIC FAILURE) Carvedilol- Coreg  . .................................................................................................................................................. Healthy Weight Tracking - BMI + Meds 5/23/2023 5/24/2023 8/11/2023 8/12/2023 8/13/2023 8/14/2023 8/15/2023   Weight 249 lb 12.8 oz 250 lb 11.2 oz 249 lb 250 lb 245 lb 1.6 oz 242 lb 12.8 oz 241 lb 11.2 oz   Height - - 5' 9\" 5' 9\" - - -   Body Mass Index 41.57 kg/m2 41.72 kg/m2 36.77 kg/m2 36.92 kg/m2 36.19 kg/m2 35.86 kg/m2 35.69 kg/m2   Some recent data might be hidden         Patient's weights and intake/output reviewed: Yes    Daily Weight log at bedside, patient/family participation in use of log: \"yes    Patient's Last Weight: 241 lbs obtained by standing scale. Difference of 1 lbs less than last documented weight.       Intake/Output Summary (Last 24 hours) at 8/15/2023 0650  Last data filed at 8/15/2023 0442  Gross per 24 hour   Intake 1245 ml   Output 700 ml   Net 545 ml       Education Booklet Provided: yes    Comorbidities Reviewed Yes    Patient has a past medical history of Anemia, Angina, Arthritis, CAD (coronary artery disease), CHF (congestive heart failure) ([address]), CKD (chronic kidney disease) stage 3, GFR 30-59 ml/min ([address]), Clostridium difficile diarrhea, Diabetes mellitus ([address]), Diabetic neuropathy ([address]), Disease of blood and blood forming organ, Dizziness, GERD (gastroesophageal reflux disease), Headache, Hearing loss, Hyperlipidemia, Hypertension, Kidney stone, Refusal of blood transfusions as patient is Yazdanism, Sleep apnea, Tinnitus, Venous ulcer ([address]), and Wound, open. >>For CHF and Comorbidity documentation on Education Time and Topics, please see Education Tab    Progressive Mobility Assessment:  What is this patient's Current Level of Mobility?: Ambulatory-Up Ad Mami  How was this patient Mobilized today?: Edge of Bed, Up to Chair, Bedside Commode,  Up to Toilet/Shower, and Up in Room, ambulated 30 ft                 With Whom? Nurse, PCA, and Self                 Level of Difficulty/Assistance: 1x Assist     Pt up in chair at this time on  1.5 L O2. Pt denies shortness of breath. Pt with pitting lower extremity edema.      Patient and/or Family's stated Goal of Care this Admission: reduce shortness of breath, increase activity tolerance, better understand heart failure and disease management, be more comfortable, and reduce lower extremity edema prior to discharge        :

## 2023-08-16 LAB
ANION GAP SERPL CALCULATED.3IONS-SCNC: 10 MMOL/L (ref 3–16)
BUN SERPL-MCNC: 114 MG/DL (ref 7–20)
CALCIUM SERPL-MCNC: 10 MG/DL (ref 8.3–10.6)
CHLORIDE SERPL-SCNC: 95 MMOL/L (ref 99–110)
CO2 SERPL-SCNC: 29 MMOL/L (ref 21–32)
CREAT SERPL-MCNC: 1.6 MG/DL (ref 0.8–1.3)
GFR SERPLBLD CREATININE-BSD FMLA CKD-EPI: 46 ML/MIN/{1.73_M2}
GLUCOSE BLD-MCNC: 168 MG/DL (ref 70–99)
GLUCOSE BLD-MCNC: 253 MG/DL (ref 70–99)
GLUCOSE BLD-MCNC: 264 MG/DL (ref 70–99)
GLUCOSE BLD-MCNC: 264 MG/DL (ref 70–99)
GLUCOSE SERPL-MCNC: 256 MG/DL (ref 70–99)
MAGNESIUM SERPL-MCNC: 2.4 MG/DL (ref 1.8–2.4)
PERFORMED ON: ABNORMAL
POTASSIUM SERPL-SCNC: 3.8 MMOL/L (ref 3.5–5.1)
SODIUM SERPL-SCNC: 134 MMOL/L (ref 136–145)

## 2023-08-16 PROCEDURE — 2580000003 HC RX 258: Performed by: NURSE PRACTITIONER

## 2023-08-16 PROCEDURE — 94761 N-INVAS EAR/PLS OXIMETRY MLT: CPT

## 2023-08-16 PROCEDURE — 83735 ASSAY OF MAGNESIUM: CPT

## 2023-08-16 PROCEDURE — 6360000002 HC RX W HCPCS: Performed by: INTERNAL MEDICINE

## 2023-08-16 PROCEDURE — 6370000000 HC RX 637 (ALT 250 FOR IP): Performed by: NURSE PRACTITIONER

## 2023-08-16 PROCEDURE — 1200000000 HC SEMI PRIVATE

## 2023-08-16 PROCEDURE — 6370000000 HC RX 637 (ALT 250 FOR IP): Performed by: INTERNAL MEDICINE

## 2023-08-16 PROCEDURE — 2700000000 HC OXYGEN THERAPY PER DAY

## 2023-08-16 PROCEDURE — 80048 BASIC METABOLIC PNL TOTAL CA: CPT

## 2023-08-16 RX ADMIN — NALOXEGOL OXALATE 25 MG: 25 TABLET, FILM COATED ORAL at 06:32

## 2023-08-16 RX ADMIN — TAMSULOSIN HYDROCHLORIDE 0.8 MG: 0.4 CAPSULE ORAL at 06:32

## 2023-08-16 RX ADMIN — GUAIFENESIN 600 MG: 600 TABLET, EXTENDED RELEASE ORAL at 21:08

## 2023-08-16 RX ADMIN — INSULIN LISPRO 8 UNITS: 100 INJECTION, SOLUTION INTRAVENOUS; SUBCUTANEOUS at 09:56

## 2023-08-16 RX ADMIN — INSULIN GLARGINE 40 UNITS: 100 INJECTION, SOLUTION SUBCUTANEOUS at 21:09

## 2023-08-16 RX ADMIN — SODIUM CHLORIDE, PRESERVATIVE FREE 10 ML: 5 INJECTION INTRAVENOUS at 22:45

## 2023-08-16 RX ADMIN — PREGABALIN 50 MG: 25 CAPSULE ORAL at 22:45

## 2023-08-16 RX ADMIN — PREGABALIN 50 MG: 25 CAPSULE ORAL at 12:18

## 2023-08-16 RX ADMIN — FERROUS SULFATE TAB 325 MG (65 MG ELEMENTAL FE) 325 MG: 325 (65 FE) TAB at 21:08

## 2023-08-16 RX ADMIN — CARVEDILOL 3.12 MG: 3.12 TABLET, FILM COATED ORAL at 17:58

## 2023-08-16 RX ADMIN — SILVER SULFADIAZINE: 10 CREAM TOPICAL at 09:53

## 2023-08-16 RX ADMIN — SODIUM CHLORIDE, PRESERVATIVE FREE 10 ML: 5 INJECTION INTRAVENOUS at 10:04

## 2023-08-16 RX ADMIN — INSULIN LISPRO 8 UNITS: 100 INJECTION, SOLUTION INTRAVENOUS; SUBCUTANEOUS at 12:18

## 2023-08-16 RX ADMIN — CARVEDILOL 3.12 MG: 3.12 TABLET, FILM COATED ORAL at 09:45

## 2023-08-16 RX ADMIN — OXYCODONE HYDROCHLORIDE AND ACETAMINOPHEN 1 TABLET: 5; 325 TABLET ORAL at 17:57

## 2023-08-16 RX ADMIN — BRIMONIDINE TARTRATE 1 DROP: 2 SOLUTION/ DROPS OPHTHALMIC at 22:46

## 2023-08-16 RX ADMIN — POTASSIUM CHLORIDE 10 MEQ: 750 TABLET, EXTENDED RELEASE ORAL at 09:45

## 2023-08-16 RX ADMIN — ASPIRIN 81 MG: 81 TABLET, COATED ORAL at 09:45

## 2023-08-16 RX ADMIN — FERROUS SULFATE TAB 325 MG (65 MG ELEMENTAL FE) 325 MG: 325 (65 FE) TAB at 09:45

## 2023-08-16 RX ADMIN — ENOXAPARIN SODIUM 30 MG: 100 INJECTION SUBCUTANEOUS at 21:08

## 2023-08-16 RX ADMIN — TIMOLOL MALEATE 1 DROP: 5 SOLUTION OPHTHALMIC at 09:45

## 2023-08-16 RX ADMIN — ALLOPURINOL 300 MG: 300 TABLET ORAL at 09:45

## 2023-08-16 RX ADMIN — PANTOPRAZOLE SODIUM 40 MG: 40 TABLET, DELAYED RELEASE ORAL at 06:32

## 2023-08-16 RX ADMIN — ATORVASTATIN CALCIUM 80 MG: 80 TABLET, FILM COATED ORAL at 21:08

## 2023-08-16 RX ADMIN — OXYCODONE HYDROCHLORIDE AND ACETAMINOPHEN 500 MG: 500 TABLET ORAL at 09:45

## 2023-08-16 RX ADMIN — SILVER SULFADIAZINE: 10 CREAM TOPICAL at 22:46

## 2023-08-16 RX ADMIN — BRIMONIDINE TARTRATE 1 DROP: 2 SOLUTION/ DROPS OPHTHALMIC at 09:45

## 2023-08-16 RX ADMIN — DOCUSATE SODIUM 100 MG: 100 CAPSULE, LIQUID FILLED ORAL at 09:45

## 2023-08-16 RX ADMIN — OXYCODONE HYDROCHLORIDE AND ACETAMINOPHEN 1 TABLET: 5; 325 TABLET ORAL at 04:29

## 2023-08-16 RX ADMIN — TIMOLOL MALEATE 1 DROP: 5 SOLUTION OPHTHALMIC at 22:46

## 2023-08-16 RX ADMIN — GUAIFENESIN 600 MG: 600 TABLET, EXTENDED RELEASE ORAL at 09:45

## 2023-08-16 RX ADMIN — DORZOLAMIDE HYDROCHLORIDE 1 DROP: 20 SOLUTION/ DROPS OPHTHALMIC at 21:27

## 2023-08-16 RX ADMIN — DOCUSATE SODIUM 100 MG: 100 CAPSULE, LIQUID FILLED ORAL at 21:08

## 2023-08-16 ASSESSMENT — PAIN DESCRIPTION - DESCRIPTORS: DESCRIPTORS: ACHING

## 2023-08-16 ASSESSMENT — PAIN DESCRIPTION - LOCATION
LOCATION: HEAD
LOCATION: BACK

## 2023-08-16 ASSESSMENT — PAIN SCALES - WONG BAKER: WONGBAKER_NUMERICALRESPONSE: 0

## 2023-08-16 ASSESSMENT — PAIN SCALES - GENERAL
PAINLEVEL_OUTOF10: 8
PAINLEVEL_OUTOF10: 8

## 2023-08-16 NOTE — PROGRESS NOTES
Rusty Coe  05/22/2018 02:30 PM     <10,000 CFU/ml mixed skin/urogenital deysi. No further workup     Blood Cultures:   Lab Results   Component Value Date/Time    BC No Growth after 4 days of incubation. 03/07/2022 04:30 PM     Lab Results   Component Value Date/Time    BLOODCULT2 No Growth after 4 days of incubation. 03/07/2022 04:34 PM     Organism:   Lab Results   Component Value Date/Time    ORG Pseudomonas aeruginosa 09/18/2020 03:23 PM    ORG Enterococcus faecalis 09/18/2020 03:23 PM         Invalid input(s): ABG        Electronically signed by Tereza Guerra MD on 8/16/2023 at 7:23 AM    ------------------------------------------------------------------------------------------------------------------------------------------------------------------------    MDM      [x] High (any 2)    A. Problems (any 1)  [] Acute/Chronic Illness/injury posing threat to life or bodily function:    [x] Severe exacerbation of chronic illness:  azotemia significantly worse, fluid overload  ---------------------------------------------------------------------  B. Risk of Treatment (any 1)   [x] Drugs/treatments that require intensive monitoring for toxicity include:  may require HD, may require paracentesis  [] Change in code status:    [] Decision to escalate care:    [] Major surgery/procedure with associated risk factors:    ----------------------------------------------------------------------  C.  Data (any 2)  [x] Discussed management of the case with:  nursing  [] Imaging personally reviewed and interpreted, includes:    [x] Data Review (any 3)  [] Collateral history obtained from:    [x] All available Consultant notes from yesterday/today were reviewed  [x] All current labs were reviewed and interpreted for clinical significance   [x] Appropriate follow-up labs were ordered

## 2023-08-16 NOTE — CARE COORDINATION
Chart reviewed day #5. Per chart review and RN, patient getting tremors, reportedly from the Lasix drip so Lasix on hold for now. Patient being followed by Nephrology. Patient is a long term resident at [facility] and can return with no barriers. Patient has a script for Mary Zuniga here at our outpatient pharmacy that he will need to take with him at discharge. CM will arrange ambulance transport at discharge.

## 2023-08-17 VITALS
HEART RATE: 91 BPM | OXYGEN SATURATION: 91 % | BODY MASS INDEX: 35.55 KG/M2 | WEIGHT: 240 LBS | RESPIRATION RATE: 20 BRPM | TEMPERATURE: 98.1 F | DIASTOLIC BLOOD PRESSURE: 71 MMHG | SYSTOLIC BLOOD PRESSURE: 132 MMHG | HEIGHT: 69 IN

## 2023-08-17 LAB
ANION GAP SERPL CALCULATED.3IONS-SCNC: 10 MMOL/L (ref 3–16)
BUN SERPL-MCNC: 91 MG/DL (ref 7–20)
CALCIUM SERPL-MCNC: 9.9 MG/DL (ref 8.3–10.6)
CHLORIDE SERPL-SCNC: 93 MMOL/L (ref 99–110)
CO2 SERPL-SCNC: 29 MMOL/L (ref 21–32)
CREAT SERPL-MCNC: 1.3 MG/DL (ref 0.8–1.3)
GFR SERPLBLD CREATININE-BSD FMLA CKD-EPI: 59 ML/MIN/{1.73_M2}
GLUCOSE BLD-MCNC: 212 MG/DL (ref 70–99)
GLUCOSE BLD-MCNC: 324 MG/DL (ref 70–99)
GLUCOSE SERPL-MCNC: 206 MG/DL (ref 70–99)
PERFORMED ON: ABNORMAL
PERFORMED ON: ABNORMAL
POTASSIUM SERPL-SCNC: 3.6 MMOL/L (ref 3.5–5.1)
SODIUM SERPL-SCNC: 132 MMOL/L (ref 136–145)

## 2023-08-17 PROCEDURE — 2580000003 HC RX 258: Performed by: NURSE PRACTITIONER

## 2023-08-17 PROCEDURE — 80048 BASIC METABOLIC PNL TOTAL CA: CPT

## 2023-08-17 PROCEDURE — 36415 COLL VENOUS BLD VENIPUNCTURE: CPT

## 2023-08-17 PROCEDURE — 6370000000 HC RX 637 (ALT 250 FOR IP): Performed by: NURSE PRACTITIONER

## 2023-08-17 PROCEDURE — 6360000002 HC RX W HCPCS: Performed by: INTERNAL MEDICINE

## 2023-08-17 PROCEDURE — 6370000000 HC RX 637 (ALT 250 FOR IP): Performed by: INTERNAL MEDICINE

## 2023-08-17 RX ADMIN — GUAIFENESIN 600 MG: 600 TABLET, EXTENDED RELEASE ORAL at 09:59

## 2023-08-17 RX ADMIN — SILVER SULFADIAZINE: 10 CREAM TOPICAL at 09:59

## 2023-08-17 RX ADMIN — OXYCODONE HYDROCHLORIDE AND ACETAMINOPHEN 500 MG: 500 TABLET ORAL at 09:59

## 2023-08-17 RX ADMIN — INSULIN LISPRO 12 UNITS: 100 INJECTION, SOLUTION INTRAVENOUS; SUBCUTANEOUS at 13:57

## 2023-08-17 RX ADMIN — ASPIRIN 81 MG: 81 TABLET, COATED ORAL at 09:59

## 2023-08-17 RX ADMIN — DOCUSATE SODIUM 100 MG: 100 CAPSULE, LIQUID FILLED ORAL at 09:59

## 2023-08-17 RX ADMIN — DORZOLAMIDE HYDROCHLORIDE 1 DROP: 20 SOLUTION/ DROPS OPHTHALMIC at 09:59

## 2023-08-17 RX ADMIN — POTASSIUM CHLORIDE 10 MEQ: 750 TABLET, EXTENDED RELEASE ORAL at 09:59

## 2023-08-17 RX ADMIN — TIMOLOL MALEATE 1 DROP: 5 SOLUTION OPHTHALMIC at 09:59

## 2023-08-17 RX ADMIN — INSULIN LISPRO 4 UNITS: 100 INJECTION, SOLUTION INTRAVENOUS; SUBCUTANEOUS at 09:59

## 2023-08-17 RX ADMIN — FERROUS SULFATE TAB 325 MG (65 MG ELEMENTAL FE) 325 MG: 325 (65 FE) TAB at 09:59

## 2023-08-17 RX ADMIN — OXYCODONE HYDROCHLORIDE AND ACETAMINOPHEN 1 TABLET: 5; 325 TABLET ORAL at 06:41

## 2023-08-17 RX ADMIN — SODIUM CHLORIDE, PRESERVATIVE FREE 10 ML: 5 INJECTION INTRAVENOUS at 09:59

## 2023-08-17 RX ADMIN — ALLOPURINOL 300 MG: 300 TABLET ORAL at 09:59

## 2023-08-17 RX ADMIN — CARVEDILOL 3.12 MG: 3.12 TABLET, FILM COATED ORAL at 09:59

## 2023-08-17 RX ADMIN — BRIMONIDINE TARTRATE 1 DROP: 2 SOLUTION/ DROPS OPHTHALMIC at 09:59

## 2023-08-17 RX ADMIN — NALOXEGOL OXALATE 25 MG: 25 TABLET, FILM COATED ORAL at 06:37

## 2023-08-17 RX ADMIN — ENOXAPARIN SODIUM 30 MG: 100 INJECTION SUBCUTANEOUS at 09:59

## 2023-08-17 RX ADMIN — PANTOPRAZOLE SODIUM 40 MG: 40 TABLET, DELAYED RELEASE ORAL at 06:37

## 2023-08-17 RX ADMIN — TAMSULOSIN HYDROCHLORIDE 0.8 MG: 0.4 CAPSULE ORAL at 06:37

## 2023-08-17 ASSESSMENT — PAIN SCALES - GENERAL: PAINLEVEL_OUTOF10: 8

## 2023-08-17 ASSESSMENT — PAIN DESCRIPTION - LOCATION: LOCATION: SHOULDER

## 2023-08-17 ASSESSMENT — PAIN DESCRIPTION - DESCRIPTORS: DESCRIPTORS: ACHING

## 2023-08-17 ASSESSMENT — PAIN DESCRIPTION - ORIENTATION: ORIENTATION: LEFT

## 2023-08-17 NOTE — DISCHARGE SUMMARY
V2.0  Discharge Summary    Name:  Argentina Galvez /Age/Sex: 1955 (70 y.o. male)   Admit Date: 2023  Discharge Date: 23    MRN & CSN:  [id number] & [id number] Encounter Date and Time 23 12:55 PM EDT    Attending:  Nicholas Sharma MD Discharging Provider: Nicholas Sharma MD       Hospital Course:     Brief HPI: Argentina Galvez is a 70 y.o. male who presented with ***    Brief Problem Based Course:   ***      The patient expressed appropriate understanding of, and agreement with the discharge recommendations, medications, and plan. Consults this admission:  IP CONSULT TO NEPHROLOGY  IP CONSULT TO HOSPITALIST  IP CONSULT TO HEART FAILURE NURSE/COORDINATOR  IP CONSULT TO DIETITIAN  IP CONSULT TO NEPHROLOGY  IP CONSULT TO CASE MANAGEMENT    Discharge Diagnosis:   Azotemia    ***    Discharge Instruction:   Follow up appointments: ***  Primary care physician: Kathleen Mcdaniel MD within 2 weeks  Diet: {diet:55706}   Activity: {discharge activity:92475}  Disposition: Discharged to:   []Home, []C, []SNF, []Acute Rehab, []Hospice ***  Condition on discharge: Stable  Labs and Tests to be Followed up as an outpatient by PCP or Specialist: ***    Discharge Medications:        Medication List        CHANGE how you take these medications      * Linzess 290 MCG Caps capsule  Generic drug: linaclotide  Take 1 capsule by mouth every morning (before breakfast)  What changed: Another medication with the same name was added. Make sure you understand how and when to take each. * Linzess 290 MCG Caps capsule  Generic drug: linaclotide  Take 1 capsule by mouth every morning (before breakfast) for 6 days  What changed: You were already taking a medication with the same name, and this prescription was added. Make sure you understand how and when to take each. * This list has 2 medication(s) that are the same as other medications prescribed for you.  Read the directions carefully, and ask

## 2023-08-17 NOTE — PROGRESS NOTES
Hospitalist Progress Note    CC: Azotemia    Name:  Won Santiago /Age/Sex: 1955  (70 y.o. male)   MRN & CSN:  [id number] & [id number] Encounter Date/Time: 2023 7:56 AM EDT   Location:  [id number] PCP: Amber Chavez MD     Attending:Larry Evans MD         Hospital course:  70 y.o. male with a significant past medical history of hypertension, hyperlipidemia, type 2 diabetes, chronic diastolic heart failure, COPD, CAD, and CKD who presents to [facility] emergency permit from [facility] with reports of elevated BUN on routine blood work. He notes recently has been having mild symptoms of fatigue and weakness at nursing home which prompted blood work. Blood work from today's encounter is not available for review but repeat blood work was performed here in the ED. Pertinent findings include sodium 131, chloride 92, , creatinine 2.1, magnesium 2.7, blood sugar 410, proBNP 2373, initial at bedtime troponin 66 with repeat of 65. Nephrology was consulted, recommended holding diuretic therapy, slow MIVF, repeat lab work in the morning. Patient denies any other symptoms of the chest pain, dyspnea, fever, or poor urinary output. Chest x-ray tonight showed stable chronic cardiomegaly with stable central pulmonary congestion. Patient received following cc bolus in the emergency department. PT evaluated by nephrology. He has had multiple admissions for fluid overload and his renal function is very sensitive to diuretics. Treated with IV Lasix and IV Albumin and transitioned to IV Lasix but given Lasix holiday due to tremors. Admit date: 2023  Days in hospital:  Hospital Day: 7  Status:  Inpatient       Subjective: Patient seen and examined at bedside.   Patient states his tremors has improved but he still have slurred speech he states that it will get better by tomorrow but he has complaint on the food especially Defibrillation/cardioversion, No Chest Compressions, and  No Resuscitative Medications    DVT prophylaxis: Lovenox  GI prophylaxis: Proton Pump Inhibitor  Antibiotic prophylaxis indicated:  No     PT/OT Eval Status:   [x]NOT yet ordered  []Ordered and Pending   []Seen with Recommendations for:  []Home independently  []Home w/ assist  []HHC  []SNF  []Acute Rehab    Disposition:  Home with home health in 2-3 day(s). Patient requires continued admission due to renal failure. Discussed with patient and nursing. This patient has a high likelihood of being discharged tomorrow and is appropriate for A1 Discharge Unit in AM pending clinical course overnight: []Yes  [x]No        Radiology/ECHO:  CXR:    Impression:        Stable cardiomegaly with mild central pulmonary congestion pulmonary artery   hypertension which is less prominent. Hazy perihilar bibasilar interstitial and ground-glass opacities which is   slightly less prominent could represent pulmonary edema vs pneumonitis or   underlying residual pulmonary fibrosis and scarring. Suggest short-term   follow-up        Diet:  ADULT DIET; Regular; 5 carb choices (75 gm/meal); Low Sodium (2 gm)    Medications:  Personally reviewed in detail in conjunction w/ labs as documented for evidence of drug toxicity.      Infusion Medications    sodium chloride      dextrose       Scheduled Medications    brimonidine  1 drop Left Eye BID    And    timolol  1 drop Left Eye BID    dorzolamide  1 drop Left Eye BID    linaclotide  290 mcg Oral QAM AC    naloxegol  25 mg Oral QAM AC    enoxaparin  30 mg SubCUTAneous BID    allopurinol  300 mg Oral Daily    ascorbic acid  500 mg Oral Daily    aspirin EC  81 mg Oral Daily    atorvastatin  80 mg Oral Nightly    carvedilol  3.125 mg Oral BID WC    docusate sodium  100 mg Oral BID    ferrous sulfate  325 mg Oral BID    guaiFENesin  600 mg Oral BID    insulin glargine  40 Units SubCUTAneous Nightly    [Held by provider] magnesium

## 2023-08-17 NOTE — CARE COORDINATION
CASE MANAGEMENT DISCHARGE SUMMARY      Discharge to: [id number] [facility] completed: [facility] Exemption Notification (HENS) completed: n/a    IMM given: (date) 8/17/23    Transportation: ambulance    Medical Transport explained to pt/family. Pt/family voice no agency preference. Agency used:[facility]   [address] [facility] up time:4pm   Ambulance form completed: Yes    Confirmed discharge plan with:patient/RN/Rita with [facility]     Patient: yes     Family:  yes       Facility/Agency, name:  [facility] faxed   Phone number for report: [phone]   RN, name: Harlen Lundborg    Note: Discharging nurse to complete [facility], reconcile [facility], and place final copy with patient's discharge packet. RN to ensure that written prescriptions for  Level II medications are sent with patient to the facility as per protocol.

## 2023-08-17 NOTE — PROGRESS NOTES
Pt d/c'd to [facility]. Removed peripheral IV's and stopped bleeding. Catheter intact. Pt tolerated well. No redness noted at site. Notified [facility] and removed tele box. Reviewed d/c instructions, home meds, and  f/u information utilizing teach-back method. Linzess given to patient. Patient supplied eye drops given to patient. Patient verbalized understanding. EMS given report. Patient taken to squad for transport.  Electronically signed by Tracey Moody RN on 8/17/23 at 4:29 PM EDT

## 2023-08-20 NOTE — ED NOTES
Pt standing bedside to use a urinal pulse ox dropped to 85% on 6L/pt placed back in bed.      Vernon Garcia RN  01/13/22 6851 I have reviewed and confirmed nurses' notes...

## 2023-08-31 ENCOUNTER — TELEPHONE (OUTPATIENT)
Dept: CARDIOLOGY CLINIC | Age: 68
End: 2023-08-31

## 2023-08-31 LAB
B-TYPE NATRIURETIC PEPTIDE: 83 PG/ML
BASOPHILS ABSOLUTE: 0 /ΜL
BASOPHILS RELATIVE PERCENT: 0.6 %
BUN BLDV-MCNC: 122 MG/DL
CALCIUM SERPL-MCNC: 8.9 MG/DL
CHLORIDE BLD-SCNC: 91 MMOL/L
CO2: 27 MMOL/L
CREAT SERPL-MCNC: 2 MG/DL
EGFR: 33
EOSINOPHILS ABSOLUTE: 0.3 /ΜL
EOSINOPHILS RELATIVE PERCENT: 4.6 %
GLUCOSE BLD-MCNC: 352 MG/DL
HCT VFR BLD CALC: 36.1 % (ref 41–53)
HEMOGLOBIN: 12 G/DL (ref 13.5–17.5)
LYMPHOCYTES ABSOLUTE: 1 /ΜL
LYMPHOCYTES RELATIVE PERCENT: 14.7 %
MCH RBC QN AUTO: 27.7 PG
MCHC RBC AUTO-ENTMCNC: 33.1 G/DL
MCV RBC AUTO: 83.7 FL
MONOCYTES ABSOLUTE: 0.5 /ΜL
MONOCYTES RELATIVE PERCENT: 7.7 %
NEUTROPHILS ABSOLUTE: 5 /ΜL
NEUTROPHILS RELATIVE PERCENT: 72.4 %
PLATELET # BLD: 148 K/ΜL
PMV BLD AUTO: 9.3 FL
POTASSIUM SERPL-SCNC: 3.6 MMOL/L
RBC # BLD: 4.31 10^6/ΜL
SODIUM BLD-SCNC: 133 MMOL/L
WBC # BLD: 6.9 10^3/ML

## 2023-08-31 NOTE — TELEPHONE ENCOUNTER
[facility] home needs clarification on instructions on how to give/when metOLazone (ZAROXOLYN) 5 MG tablet   Sig: Take 1 tablet by mouth See Admin Instructions MWF prn for wt gain >3lbs in 24h  or 5lbs in week.

## 2023-09-01 NOTE — TELEPHONE ENCOUNTER
Pt prescribed metolazone in hospital by other provider. Pt also on spironolactone by [facility] based on weight gain. Any suggestions on how to take both of these medications? Nursing Home asking for clarifications.

## 2023-09-01 NOTE — TELEPHONE ENCOUNTER
Spironolactone (aldactone) is a daily med and not adjusted based on weight. Follow the metolazone guide they were given per the kidney doctor. Recommend further diuretic management to nephrology.

## 2023-09-11 ENCOUNTER — TELEPHONE (OUTPATIENT)
Dept: CARDIOLOGY CLINIC | Age: 68
End: 2023-09-11

## 2023-09-11 NOTE — TELEPHONE ENCOUNTER
Called and spoke with patients SIGRID Cordova. Relayed NP Rachel Code message. Verbalized understanding.

## 2023-09-11 NOTE — TELEPHONE ENCOUNTER
----- Message from PATRICK Dunbar CNP sent at 9/1/2023  3:17 PM EDT -----  Please call nursing home, these results need to go to the nephrologist.

## 2023-10-12 ENCOUNTER — OFFICE VISIT (OUTPATIENT)
Dept: CARDIOLOGY CLINIC | Age: 68
End: 2023-10-12
Payer: MEDICARE

## 2023-10-12 VITALS
OXYGEN SATURATION: 95 % | HEIGHT: 69 IN | SYSTOLIC BLOOD PRESSURE: 90 MMHG | HEART RATE: 60 BPM | BODY MASS INDEX: 35.37 KG/M2 | WEIGHT: 238.8 LBS | DIASTOLIC BLOOD PRESSURE: 46 MMHG

## 2023-10-12 DIAGNOSIS — D64.9 ANEMIA, UNSPECIFIED TYPE: ICD-10-CM

## 2023-10-12 DIAGNOSIS — I48.0 PAF (PAROXYSMAL ATRIAL FIBRILLATION) (HCC): ICD-10-CM

## 2023-10-12 DIAGNOSIS — I50.32 CHRONIC DIASTOLIC HEART FAILURE (HCC): Primary | ICD-10-CM

## 2023-10-12 DIAGNOSIS — N18.4 STAGE 4 CHRONIC KIDNEY DISEASE (HCC): ICD-10-CM

## 2023-10-12 DIAGNOSIS — I27.81 COR PULMONALE, CHRONIC (HCC): ICD-10-CM

## 2023-10-12 PROCEDURE — 3078F DIAST BP <80 MM HG: CPT | Performed by: NURSE PRACTITIONER

## 2023-10-12 PROCEDURE — 1123F ACP DISCUSS/DSCN MKR DOCD: CPT | Performed by: NURSE PRACTITIONER

## 2023-10-12 PROCEDURE — G8417 CALC BMI ABV UP PARAM F/U: HCPCS | Performed by: NURSE PRACTITIONER

## 2023-10-12 PROCEDURE — 99214 OFFICE O/P EST MOD 30 MIN: CPT | Performed by: NURSE PRACTITIONER

## 2023-10-12 PROCEDURE — 1036F TOBACCO NON-USER: CPT | Performed by: NURSE PRACTITIONER

## 2023-10-12 PROCEDURE — 3074F SYST BP LT 130 MM HG: CPT | Performed by: NURSE PRACTITIONER

## 2023-10-12 PROCEDURE — G8484 FLU IMMUNIZE NO ADMIN: HCPCS | Performed by: NURSE PRACTITIONER

## 2023-10-12 PROCEDURE — G8427 DOCREV CUR MEDS BY ELIG CLIN: HCPCS | Performed by: NURSE PRACTITIONER

## 2023-10-12 PROCEDURE — 3017F COLORECTAL CA SCREEN DOC REV: CPT | Performed by: NURSE PRACTITIONER

## 2023-10-12 NOTE — PROGRESS NOTES
Allergies:  Amitriptyline, Celecoxib, Cymbalta [duloxetine hcl], Elavil [amitriptyline hcl], Gabapentin, and Nsaids     Physical Examination:    Vitals:    10/12/23 1404 10/12/23 1412   BP: (!) 90/50 (!) 90/46   Pulse: 60    SpO2: 95%    Weight: 238 lb 12.8 oz (108.3 kg)    Height: 5' 9\" (1.753 m)        Physical exam was reviewed and updated as below at the time of the visit:     Constitutional and General Appearance: no apparent distress, obese  HEENT: non-icteric sclera,  disconjugate gaze  Neck: JVP difficult to assess   Respiratory:  No use of accessory muscles  Diminished breath sounds throughout, no wheezing, no crackles, no rhonchi  Cardiovascular:  + 3/6 high pitched systolic murmur loudest at second right sternal border, no rub or gallop  Regular rate and irregular rhythm, S1,S2 distant  Radial pulses 2+ and equal bilaterally  No BLE edema   Abdomen:  No masses or tenderness  Liver: No Abnormalities Noted  Musculoskeletal/Skin:  Wheelchair   There is no clubbing, cyanosis of the extremities  Skin is warm and dry  Moves all extremities   Neurological/Psychiatric:  Alert and oriented in all spheres  No abnormalities of mood, affect, memory, mentation, or behavior are noted    Lab Data:  CBC:   Lab Results   Component Value Date/Time    WBC 6.9 08/31/2023 12:00 AM    WBC 5.7 08/14/2023 05:06 AM    WBC 6.3 08/13/2023 04:40 AM    RBC 4.31 08/31/2023 12:00 AM    RBC 4.31 08/14/2023 05:06 AM    RBC 4.36 08/13/2023 04:40 AM    HGB 12 08/31/2023 12:00 AM    HGB 12.1 08/14/2023 05:06 AM    HGB 12.2 08/13/2023 04:40 AM    HCT 36.1 08/31/2023 12:00 AM    HCT 35.9 08/14/2023 05:06 AM    HCT 36.3 08/13/2023 04:40 AM    MCV 83.7 08/31/2023 12:00 AM    MCV 83.3 08/14/2023 05:06 AM    MCV 83.3 08/13/2023 04:40 AM    RDW 19.2 08/14/2023 05:06 AM    RDW 19.2 08/13/2023 04:40 AM    RDW 19.3 08/12/2023 04:42 AM     08/31/2023 12:00 AM     08/14/2023 05:06 AM     08/13/2023 04:40 AM     BMP:   Lab

## 2023-10-12 NOTE — PATIENT INSTRUCTIONS
Plan:   Continue daily weights  No medication list to review from facility- to be faxed  Recent labs reviewed  Follow up with nephrology as planned   Follow up 3 months

## 2023-12-21 LAB
BASOPHILS ABSOLUTE: ABNORMAL
BASOPHILS RELATIVE PERCENT: 0.4 %
BUN BLDV-MCNC: 103 MG/DL
CALCIUM SERPL-MCNC: 8.9 MG/DL
CHLORIDE BLD-SCNC: 89 MMOL/L
CO2: 28 MMOL/L
CREAT SERPL-MCNC: 2 MG/DL
EGFR: NORMAL
EOSINOPHILS ABSOLUTE: 0.2 /ΜL
EOSINOPHILS RELATIVE PERCENT: 3.2 %
GLUCOSE BLD-MCNC: 518 MG/DL
HCT VFR BLD CALC: 39.5 % (ref 41–53)
HEMOGLOBIN: 12.9 G/DL (ref 13.5–17.5)
LYMPHOCYTES ABSOLUTE: 1.1 /ΜL
LYMPHOCYTES RELATIVE PERCENT: 13.6 %
MCH RBC QN AUTO: 28.5 PG
MCHC RBC AUTO-ENTMCNC: 32.7 G/DL
MCV RBC AUTO: 87.2 FL
MONOCYTES ABSOLUTE: 0.7 /ΜL
MONOCYTES RELATIVE PERCENT: 8.4 %
NEUTROPHILS ABSOLUTE: 5.8 /ΜL
NEUTROPHILS RELATIVE PERCENT: 74.4 %
PLATELET # BLD: 134 K/ΜL
PMV BLD AUTO: 9.9 FL
POTASSIUM SERPL-SCNC: 4 MMOL/L
RBC # BLD: 4.53 10^6/ΜL
SODIUM BLD-SCNC: 131 MMOL/L
WBC # BLD: 7.8 10^3/ML

## 2023-12-28 LAB
BASOPHILS ABSOLUTE: ABNORMAL
BASOPHILS RELATIVE PERCENT: 0.5 %
BUN BLDV-MCNC: 142 MG/DL
CALCIUM SERPL-MCNC: 9 MG/DL
CHLORIDE BLD-SCNC: 88 MMOL/L
CO2: 26 MMOL/L
CREAT SERPL-MCNC: 2 MG/DL
EGFR: NORMAL
EOSINOPHILS ABSOLUTE: 0.3 /ΜL
EOSINOPHILS RELATIVE PERCENT: 3.8 %
GLUCOSE BLD-MCNC: 405 MG/DL
HCT VFR BLD CALC: 39.7 % (ref 41–53)
HEMOGLOBIN: 13.8 G/DL (ref 13.5–17.5)
LYMPHOCYTES ABSOLUTE: 1 /ΜL
LYMPHOCYTES RELATIVE PERCENT: 11.6 %
MCH RBC QN AUTO: 29.7 PG
MCHC RBC AUTO-ENTMCNC: 34.9 G/DL
MCV RBC AUTO: 85 FL
MONOCYTES ABSOLUTE: 0.6 /ΜL
MONOCYTES RELATIVE PERCENT: 7.2 %
NEUTROPHILS ABSOLUTE: 6.9 /ΜL
NEUTROPHILS RELATIVE PERCENT: 76.9 %
PLATELET # BLD: 150 K/ΜL
PMV BLD AUTO: 10 FL
POTASSIUM SERPL-SCNC: 3.7 MMOL/L
RBC # BLD: 4.67 10^6/ΜL
SODIUM BLD-SCNC: 128 MMOL/L
WBC # BLD: 9 10^3/ML

## 2024-01-04 LAB
BASOPHILS ABSOLUTE: 0.4 /ΜL
BASOPHILS RELATIVE PERCENT: 0.4 %
BUN BLDV-MCNC: 116 MG/DL
CALCIUM SERPL-MCNC: 10 MG/DL
CHLORIDE BLD-SCNC: 88 MMOL/L
CO2: 25 MMOL/L
CREAT SERPL-MCNC: 2.1 MG/DL
EGFR: NORMAL
EOSINOPHILS ABSOLUTE: 0.3 /ΜL
EOSINOPHILS RELATIVE PERCENT: 3.5 %
GLUCOSE BLD-MCNC: 472 MG/DL
HCT VFR BLD CALC: 40.9 % (ref 41–53)
HEMOGLOBIN: 13.8 G/DL (ref 13.5–17.5)
LYMPHOCYTES ABSOLUTE: 1.2 /ΜL
LYMPHOCYTES RELATIVE PERCENT: 14.8 %
MCH RBC QN AUTO: 29.2 PG
MCHC RBC AUTO-ENTMCNC: 33.9 G/DL
MCV RBC AUTO: 86.3 FL
MONOCYTES ABSOLUTE: 0.8 /ΜL
MONOCYTES RELATIVE PERCENT: 9.1 %
NEUTROPHILS ABSOLUTE: 6.1 /ΜL
NEUTROPHILS RELATIVE PERCENT: 72.2 %
PLATELET # BLD: 157 K/ΜL
PMV BLD AUTO: 9.8 FL
POTASSIUM SERPL-SCNC: 3.9 MMOL/L
RBC # BLD: 4.73 10^6/ΜL
SODIUM BLD-SCNC: 129 MMOL/L
WBC # BLD: 8.4 10^3/ML

## 2024-01-11 LAB
B-TYPE NATRIURETIC PEPTIDE: 103 PG/ML
BASOPHILS ABSOLUTE: ABNORMAL
BASOPHILS RELATIVE PERCENT: 0.3 %
BUN BLDV-MCNC: 126 MG/DL
CALCIUM SERPL-MCNC: 9 MG/DL
CHLORIDE BLD-SCNC: 92 MMOL/L
CO2: 27 MMOL/L
CREAT SERPL-MCNC: 2.1 MG/DL
EGFR: NORMAL
EOSINOPHILS ABSOLUTE: 0.3 /ΜL
EOSINOPHILS RELATIVE PERCENT: 3.6 %
GLUCOSE BLD-MCNC: 372 MG/DL
HCT VFR BLD CALC: 38.1 % (ref 41–53)
HEMOGLOBIN: 12.8 G/DL (ref 13.5–17.5)
LYMPHOCYTES ABSOLUTE: 1.2 /ΜL
LYMPHOCYTES RELATIVE PERCENT: 14.5 %
MCH RBC QN AUTO: 29.1 PG
MCHC RBC AUTO-ENTMCNC: 33.7 G/DL
MCV RBC AUTO: 86.3 FL
MONOCYTES ABSOLUTE: 0.6 /ΜL
MONOCYTES RELATIVE PERCENT: 7.5 %
NEUTROPHILS ABSOLUTE: 6.3 /ΜL
NEUTROPHILS RELATIVE PERCENT: 74.1 %
PLATELET # BLD: 127 K/ΜL
PMV BLD AUTO: 9.1 FL
POTASSIUM SERPL-SCNC: 3.7 MMOL/L
RBC # BLD: 4.41 10^6/ΜL
SODIUM BLD-SCNC: 133 MMOL/L
WBC # BLD: 8.5 10^3/ML

## 2024-01-12 NOTE — PROGRESS NOTES
[facility]   Cardiac Follow-up    Primary Care Doctor:  Julia Stein MD    Chief Complaint   Patient presents with    Congestive Heart Failure    Follow-up       History of Present Illness:   I had the pleasure of seeing Shay Townsend in follow up for diastolic heart failure, HTN, CAD s/p CABG, pHTN, CKD.    Directly admitted from my office to [facility] for volume overload, treated with lasix infusion on 8/24/22-9/1/22. Admission weight up to 302lbs;  He had 8L paracentesis. Discharged on torsemide 100mg BID and metolazone three times a week per nephrology.   Recently admitted  10/21/22- 10/25/22 with fatigue and abnormal labs with  and creatinine 1.9. treated with IVFs. Admissions weight  was 194lb    Since last visit, no meds were changed.  He is following with nephrology as well; last visit 12/2023  His eye sight is worse.   Creatinine was 2.1 last week per patient- no records to review at the time of the visit.   Denies any mental \"fogginess\"   Concerned about the kidney numbers and his vision.   No weeping  or sores.   Spo2 running 91% at the home  Some shortness of breath.   Fullness in abd.   Asking about dialysis and whether it would help his foggy vision.   Has tremors at times   Walking after dinner- walking about 1/2 mile with the walker.     Shay Townsend describes symptoms including dyspnea on exertion, fatigue, lower extremity edema but denies chest pain, chest pressure/discomfort, dizziness, palpitations, syncope    Home weights: 242 lbs at the nursing home.   Appetite: up  Diet: nursing home diet     Taking medications as prescribed: Yes  Able to afford medications: Yes    NYHA:   III  ACC/ AHA Stage:    C    Past Medical History:   has a past medical history of Anemia, Angina, Arthritis, CAD (coronary artery disease), CHF (congestive heart failure) ([facility]), CKD (chronic kidney disease) stage 3, GFR 30-59 ml/min ([facility]), Clostridium difficile diarrhea, Diabetes mellitus ([facility]),

## 2024-01-15 ENCOUNTER — OFFICE VISIT (OUTPATIENT)
Dept: CARDIOLOGY CLINIC | Age: 69
End: 2024-01-15
Payer: MEDICARE

## 2024-01-15 ENCOUNTER — TELEPHONE (OUTPATIENT)
Dept: CARDIOLOGY CLINIC | Age: 69
End: 2024-01-15

## 2024-01-15 VITALS
HEART RATE: 65 BPM | WEIGHT: 242 LBS | OXYGEN SATURATION: 91 % | DIASTOLIC BLOOD PRESSURE: 58 MMHG | HEIGHT: 69 IN | SYSTOLIC BLOOD PRESSURE: 120 MMHG | BODY MASS INDEX: 35.84 KG/M2

## 2024-01-15 DIAGNOSIS — I27.81 COR PULMONALE, CHRONIC (HCC): ICD-10-CM

## 2024-01-15 DIAGNOSIS — I10 ESSENTIAL HYPERTENSION: ICD-10-CM

## 2024-01-15 DIAGNOSIS — I35.0 MODERATE AORTIC STENOSIS: ICD-10-CM

## 2024-01-15 DIAGNOSIS — N18.4 STAGE 4 CHRONIC KIDNEY DISEASE (HCC): ICD-10-CM

## 2024-01-15 DIAGNOSIS — I50.32 CHRONIC DIASTOLIC HEART FAILURE (HCC): Primary | ICD-10-CM

## 2024-01-15 PROCEDURE — 1036F TOBACCO NON-USER: CPT | Performed by: NURSE PRACTITIONER

## 2024-01-15 PROCEDURE — 3074F SYST BP LT 130 MM HG: CPT | Performed by: NURSE PRACTITIONER

## 2024-01-15 PROCEDURE — 3017F COLORECTAL CA SCREEN DOC REV: CPT | Performed by: NURSE PRACTITIONER

## 2024-01-15 PROCEDURE — 3078F DIAST BP <80 MM HG: CPT | Performed by: NURSE PRACTITIONER

## 2024-01-15 PROCEDURE — G8427 DOCREV CUR MEDS BY ELIG CLIN: HCPCS | Performed by: NURSE PRACTITIONER

## 2024-01-15 PROCEDURE — 1123F ACP DISCUSS/DSCN MKR DOCD: CPT | Performed by: NURSE PRACTITIONER

## 2024-01-15 PROCEDURE — 99214 OFFICE O/P EST MOD 30 MIN: CPT | Performed by: NURSE PRACTITIONER

## 2024-01-15 PROCEDURE — G8417 CALC BMI ABV UP PARAM F/U: HCPCS | Performed by: NURSE PRACTITIONER

## 2024-01-15 PROCEDURE — G8484 FLU IMMUNIZE NO ADMIN: HCPCS | Performed by: NURSE PRACTITIONER

## 2024-01-15 RX ORDER — MECLIZINE HYDROCHLORIDE 25 MG/1
25 TABLET ORAL 3 TIMES DAILY PRN
COMMUNITY

## 2024-01-15 RX ORDER — OXYCODONE HYDROCHLORIDE AND ACETAMINOPHEN 5; 325 MG/1; MG/1
1 TABLET ORAL EVERY 4 HOURS PRN
COMMUNITY

## 2024-01-15 RX ORDER — PREGABALIN 50 MG/1
50 CAPSULE ORAL 2 TIMES DAILY
COMMUNITY

## 2024-01-15 RX ORDER — ONDANSETRON 4 MG/1
4 TABLET, ORALLY DISINTEGRATING ORAL EVERY 8 HOURS PRN
COMMUNITY

## 2024-01-15 RX ORDER — LOPERAMIDE HYDROCHLORIDE 2 MG/1
2 CAPSULE ORAL 4 TIMES DAILY PRN
COMMUNITY

## 2024-01-15 RX ORDER — LANOLIN ALCOHOL/MO/W.PET/CERES
400 CREAM (GRAM) TOPICAL DAILY
COMMUNITY

## 2024-01-15 NOTE — TELEPHONE ENCOUNTER
Please call nursing home and get last lab results scanned to us and a copy of his current medications to review.   Thanks

## 2024-01-15 NOTE — TELEPHONE ENCOUNTER
Received lab results and medication list from [facility]. Labs abstracted and medications updated in Epic. Report scanned into media for review

## 2024-01-15 NOTE — PATIENT INSTRUCTIONS
Plan:   Continue daily weights  Obtain last labs - fax results to [phone]  Please fax med list to review  Follow up with nephrology as planned   Follow up CHF 6 months

## 2024-01-15 NOTE — TELEPHONE ENCOUNTER
Spoke with Staff at [facility] who reports she faxed over today around 2 pm. Will look for results and scan in

## 2024-01-16 NOTE — TELEPHONE ENCOUNTER
Spoke with RN. Dr. Bryant discontinued med x 10 days starting on 1/12/2024 due to lab results (high creatinine).

## 2024-01-16 NOTE — TELEPHONE ENCOUNTER
Reviewed.   Looks like he is not on metolazone anymore.   The last nephrology notes say he should be on metolazone 2 times a week.   Please have nursing reach out to nephrology to clarify if he should be on metolazone 2 times a week.   Thanks

## 2024-05-02 ENCOUNTER — HOSPITAL ENCOUNTER (INPATIENT)
Age: 69
LOS: 8 days | Discharge: NURSING, REHAB OR LONG-TERM CARE | End: 2024-05-10
Attending: STUDENT IN AN ORGANIZED HEALTH CARE EDUCATION/TRAINING PROGRAM | Admitting: INTERNAL MEDICINE
Payer: MEDICARE

## 2024-05-02 ENCOUNTER — APPOINTMENT (OUTPATIENT)
Dept: GENERAL RADIOLOGY | Age: 69
End: 2024-05-02
Payer: MEDICARE

## 2024-05-02 DIAGNOSIS — I50.9 ACUTE ON CHRONIC CONGESTIVE HEART FAILURE, UNSPECIFIED HEART FAILURE TYPE (HCC): Primary | ICD-10-CM

## 2024-05-02 DIAGNOSIS — R13.10 DYSPHAGIA, UNSPECIFIED TYPE: ICD-10-CM

## 2024-05-02 DIAGNOSIS — N18.30 ACUTE WORSENING OF STAGE 3 CHRONIC KIDNEY DISEASE (HCC): ICD-10-CM

## 2024-05-02 PROBLEM — I50.31 CHF NYHA CLASS I, ACUTE, DIASTOLIC (HCC): Status: ACTIVE | Noted: 2024-05-02

## 2024-05-02 LAB
ALBUMIN SERPL-MCNC: 3.6 G/DL (ref 3.4–5)
ALBUMIN/GLOB SERPL: 1.1 {RATIO} (ref 1.1–2.2)
ALP SERPL-CCNC: 109 U/L (ref 40–129)
ALT SERPL-CCNC: 10 U/L (ref 10–40)
ANION GAP SERPL CALCULATED.3IONS-SCNC: 10 MMOL/L (ref 3–16)
AST SERPL-CCNC: 16 U/L (ref 15–37)
BASE EXCESS BLDV CALC-SCNC: 2.3 MMOL/L (ref -3–3)
BASOPHILS # BLD: 0 K/UL (ref 0–0.2)
BASOPHILS NFR BLD: 0.4 %
BILIRUB SERPL-MCNC: 0.9 MG/DL (ref 0–1)
BUN SERPL-MCNC: 108 MG/DL (ref 7–20)
CALCIUM SERPL-MCNC: 9 MG/DL (ref 8.3–10.6)
CHLORIDE SERPL-SCNC: 90 MMOL/L (ref 99–110)
CO2 BLDV-SCNC: 31 MMOL/L
CO2 SERPL-SCNC: 30 MMOL/L (ref 21–32)
COHGB MFR BLDV: 3.6 % (ref 0–1.5)
CREAT SERPL-MCNC: 1.7 MG/DL (ref 0.8–1.3)
DEPRECATED RDW RBC AUTO: 19.4 % (ref 12.4–15.4)
EKG ATRIAL RATE: 89 BPM
EKG DIAGNOSIS: NORMAL
EKG Q-T INTERVAL: 444 MS
EKG QRS DURATION: 80 MS
EKG QTC CALCULATION (BAZETT): 543 MS
EKG R AXIS: -67 DEGREES
EKG T AXIS: 210 DEGREES
EKG VENTRICULAR RATE: 90 BPM
EOSINOPHIL # BLD: 0.3 K/UL (ref 0–0.6)
EOSINOPHIL NFR BLD: 3.1 %
FLUAV RNA RESP QL NAA+PROBE: NOT DETECTED
FLUBV RNA RESP QL NAA+PROBE: NOT DETECTED
GFR SERPLBLD CREATININE-BSD FMLA CKD-EPI: 43 ML/MIN/{1.73_M2}
GLUCOSE BLD-MCNC: 282 MG/DL (ref 70–99)
GLUCOSE BLD-MCNC: 321 MG/DL (ref 70–99)
GLUCOSE SERPL-MCNC: 307 MG/DL (ref 70–99)
HCO3 BLDV-SCNC: 29.5 MMOL/L (ref 23–29)
HCT VFR BLD AUTO: 43.1 % (ref 40.5–52.5)
HGB BLD-MCNC: 13.9 G/DL (ref 13.5–17.5)
LACTATE BLDV-SCNC: 0.9 MMOL/L (ref 0.4–2)
LYMPHOCYTES # BLD: 0.6 K/UL (ref 1–5.1)
LYMPHOCYTES NFR BLD: 7.3 %
MCH RBC QN AUTO: 27.8 PG (ref 26–34)
MCHC RBC AUTO-ENTMCNC: 32.2 G/DL (ref 31–36)
MCV RBC AUTO: 86.3 FL (ref 80–100)
METHGB MFR BLDV: 0.2 %
MONOCYTES # BLD: 0.7 K/UL (ref 0–1.3)
MONOCYTES NFR BLD: 8.3 %
NEUTROPHILS # BLD: 6.6 K/UL (ref 1.7–7.7)
NEUTROPHILS NFR BLD: 80.9 %
NT-PROBNP SERPL-MCNC: 3596 PG/ML (ref 0–124)
O2 THERAPY: ABNORMAL
PCO2 BLDV: 55.5 MMHG (ref 40–50)
PERFORMED ON: ABNORMAL
PERFORMED ON: ABNORMAL
PH BLDV: 7.34 [PH] (ref 7.35–7.45)
PLATELET # BLD AUTO: 122 K/UL (ref 135–450)
PMV BLD AUTO: 9.1 FL (ref 5–10.5)
PO2 BLDV: 50.3 MMHG (ref 25–40)
POTASSIUM SERPL-SCNC: 4.3 MMOL/L (ref 3.5–5.1)
PROCALCITONIN SERPL IA-MCNC: 0.29 NG/ML (ref 0–0.15)
PROT SERPL-MCNC: 6.8 G/DL (ref 6.4–8.2)
RBC # BLD AUTO: 4.99 M/UL (ref 4.2–5.9)
SAO2 % BLDV: 84 %
SARS-COV-2 RNA RESP QL NAA+PROBE: NOT DETECTED
SODIUM SERPL-SCNC: 130 MMOL/L (ref 136–145)
TROPONIN, HIGH SENSITIVITY: 89 NG/L (ref 0–22)
TROPONIN, HIGH SENSITIVITY: 92 NG/L (ref 0–22)
WBC # BLD AUTO: 8.1 K/UL (ref 4–11)

## 2024-05-02 PROCEDURE — 83605 ASSAY OF LACTIC ACID: CPT

## 2024-05-02 PROCEDURE — 99285 EMERGENCY DEPT VISIT HI MDM: CPT

## 2024-05-02 PROCEDURE — 2580000003 HC RX 258: Performed by: INTERNAL MEDICINE

## 2024-05-02 PROCEDURE — 84484 ASSAY OF TROPONIN QUANT: CPT

## 2024-05-02 PROCEDURE — 84439 ASSAY OF FREE THYROXINE: CPT

## 2024-05-02 PROCEDURE — 93005 ELECTROCARDIOGRAM TRACING: CPT | Performed by: EMERGENCY MEDICINE

## 2024-05-02 PROCEDURE — 94761 N-INVAS EAR/PLS OXIMETRY MLT: CPT

## 2024-05-02 PROCEDURE — 87636 SARSCOV2 & INF A&B AMP PRB: CPT

## 2024-05-02 PROCEDURE — 85025 COMPLETE CBC W/AUTO DIFF WBC: CPT

## 2024-05-02 PROCEDURE — 80053 COMPREHEN METABOLIC PANEL: CPT

## 2024-05-02 PROCEDURE — 84443 ASSAY THYROID STIM HORMONE: CPT

## 2024-05-02 PROCEDURE — 6370000000 HC RX 637 (ALT 250 FOR IP): Performed by: INTERNAL MEDICINE

## 2024-05-02 PROCEDURE — 6360000002 HC RX W HCPCS: Performed by: INTERNAL MEDICINE

## 2024-05-02 PROCEDURE — 6360000002 HC RX W HCPCS: Performed by: PHYSICIAN ASSISTANT

## 2024-05-02 PROCEDURE — 96374 THER/PROPH/DIAG INJ IV PUSH: CPT

## 2024-05-02 PROCEDURE — 83880 ASSAY OF NATRIURETIC PEPTIDE: CPT

## 2024-05-02 PROCEDURE — 82803 BLOOD GASES ANY COMBINATION: CPT

## 2024-05-02 PROCEDURE — 1200000000 HC SEMI PRIVATE

## 2024-05-02 PROCEDURE — 84145 PROCALCITONIN (PCT): CPT

## 2024-05-02 PROCEDURE — 2700000000 HC OXYGEN THERAPY PER DAY

## 2024-05-02 PROCEDURE — 93010 ELECTROCARDIOGRAM REPORT: CPT | Performed by: INTERNAL MEDICINE

## 2024-05-02 PROCEDURE — 83036 HEMOGLOBIN GLYCOSYLATED A1C: CPT

## 2024-05-02 PROCEDURE — 71046 X-RAY EXAM CHEST 2 VIEWS: CPT

## 2024-05-02 RX ORDER — SODIUM CHLORIDE 9 MG/ML
INJECTION, SOLUTION INTRAVENOUS PRN
Status: DISCONTINUED | OUTPATIENT
Start: 2024-05-02 | End: 2024-05-10 | Stop reason: HOSPADM

## 2024-05-02 RX ORDER — PREGABALIN 25 MG/1
50 CAPSULE ORAL 2 TIMES DAILY
Status: DISCONTINUED | OUTPATIENT
Start: 2024-05-02 | End: 2024-05-10 | Stop reason: HOSPADM

## 2024-05-02 RX ORDER — ONDANSETRON 4 MG/1
4 TABLET, ORALLY DISINTEGRATING ORAL EVERY 8 HOURS PRN
Status: DISCONTINUED | OUTPATIENT
Start: 2024-05-02 | End: 2024-05-10 | Stop reason: HOSPADM

## 2024-05-02 RX ORDER — INSULIN LISPRO 100 [IU]/ML
0-4 INJECTION, SOLUTION INTRAVENOUS; SUBCUTANEOUS NIGHTLY
Status: DISCONTINUED | OUTPATIENT
Start: 2024-05-02 | End: 2024-05-10 | Stop reason: HOSPADM

## 2024-05-02 RX ORDER — BRIMONIDINE TARTRATE 2 MG/ML
1 SOLUTION/ DROPS OPHTHALMIC 2 TIMES DAILY
Status: DISCONTINUED | OUTPATIENT
Start: 2024-05-02 | End: 2024-05-10 | Stop reason: HOSPADM

## 2024-05-02 RX ORDER — ALLOPURINOL 300 MG/1
300 TABLET ORAL DAILY
Status: DISCONTINUED | OUTPATIENT
Start: 2024-05-02 | End: 2024-05-10 | Stop reason: HOSPADM

## 2024-05-02 RX ORDER — BRIMONIDINE TARTRATE AND TIMOLOL MALEATE 2; 5 MG/ML; MG/ML
1 SOLUTION OPHTHALMIC 2 TIMES DAILY
Status: DISCONTINUED | OUTPATIENT
Start: 2024-05-02 | End: 2024-05-02

## 2024-05-02 RX ORDER — DORZOLAMIDE HCL 20 MG/ML
1 SOLUTION/ DROPS OPHTHALMIC 2 TIMES DAILY
Status: DISCONTINUED | OUTPATIENT
Start: 2024-05-02 | End: 2024-05-10 | Stop reason: HOSPADM

## 2024-05-02 RX ORDER — SODIUM CHLORIDE 0.9 % (FLUSH) 0.9 %
5-40 SYRINGE (ML) INJECTION PRN
Status: DISCONTINUED | OUTPATIENT
Start: 2024-05-02 | End: 2024-05-10 | Stop reason: HOSPADM

## 2024-05-02 RX ORDER — CARBOXYMETHYLCELLULOSE SODIUM 10 MG/ML
1 GEL OPHTHALMIC 4 TIMES DAILY PRN
Status: DISCONTINUED | OUTPATIENT
Start: 2024-05-02 | End: 2024-05-02

## 2024-05-02 RX ORDER — DEXTROSE MONOHYDRATE 100 MG/ML
INJECTION, SOLUTION INTRAVENOUS CONTINUOUS PRN
Status: DISCONTINUED | OUTPATIENT
Start: 2024-05-02 | End: 2024-05-10 | Stop reason: HOSPADM

## 2024-05-02 RX ORDER — DOCUSATE SODIUM 100 MG/1
100 CAPSULE, LIQUID FILLED ORAL 2 TIMES DAILY
Status: DISCONTINUED | OUTPATIENT
Start: 2024-05-02 | End: 2024-05-10 | Stop reason: HOSPADM

## 2024-05-02 RX ORDER — ASPIRIN 81 MG/1
81 TABLET ORAL DAILY
Status: DISCONTINUED | OUTPATIENT
Start: 2024-05-02 | End: 2024-05-10 | Stop reason: HOSPADM

## 2024-05-02 RX ORDER — ONDANSETRON 2 MG/ML
4 INJECTION INTRAMUSCULAR; INTRAVENOUS EVERY 6 HOURS PRN
Status: DISCONTINUED | OUTPATIENT
Start: 2024-05-02 | End: 2024-05-02

## 2024-05-02 RX ORDER — TAMSULOSIN HYDROCHLORIDE 0.4 MG/1
0.8 CAPSULE ORAL DAILY
Status: DISCONTINUED | OUTPATIENT
Start: 2024-05-02 | End: 2024-05-10 | Stop reason: HOSPADM

## 2024-05-02 RX ORDER — SPIRONOLACTONE 25 MG/1
25 TABLET ORAL DAILY
Status: DISCONTINUED | OUTPATIENT
Start: 2024-05-02 | End: 2024-05-03

## 2024-05-02 RX ORDER — ACETAMINOPHEN 650 MG/1
650 SUPPOSITORY RECTAL EVERY 6 HOURS PRN
Status: DISCONTINUED | OUTPATIENT
Start: 2024-05-02 | End: 2024-05-10 | Stop reason: HOSPADM

## 2024-05-02 RX ORDER — GLUCAGON 1 MG/ML
1 KIT INJECTION PRN
Status: DISCONTINUED | OUTPATIENT
Start: 2024-05-02 | End: 2024-05-10 | Stop reason: HOSPADM

## 2024-05-02 RX ORDER — KETOCONAZOLE 20 MG/ML
SHAMPOO TOPICAL DAILY PRN
COMMUNITY

## 2024-05-02 RX ORDER — FUROSEMIDE 10 MG/ML
40 INJECTION INTRAMUSCULAR; INTRAVENOUS ONCE
Status: COMPLETED | OUTPATIENT
Start: 2024-05-02 | End: 2024-05-02

## 2024-05-02 RX ORDER — TIMOLOL MALEATE 5 MG/ML
1 SOLUTION/ DROPS OPHTHALMIC 2 TIMES DAILY
Status: DISCONTINUED | OUTPATIENT
Start: 2024-05-02 | End: 2024-05-10 | Stop reason: HOSPADM

## 2024-05-02 RX ORDER — INSULIN LISPRO 100 [IU]/ML
0-4 INJECTION, SOLUTION INTRAVENOUS; SUBCUTANEOUS
Status: DISCONTINUED | OUTPATIENT
Start: 2024-05-02 | End: 2024-05-10 | Stop reason: HOSPADM

## 2024-05-02 RX ORDER — INSULIN GLARGINE 100 [IU]/ML
40 INJECTION, SOLUTION SUBCUTANEOUS NIGHTLY
Status: DISCONTINUED | OUTPATIENT
Start: 2024-05-02 | End: 2024-05-08

## 2024-05-02 RX ORDER — ATORVASTATIN CALCIUM 80 MG/1
80 TABLET, FILM COATED ORAL NIGHTLY
Status: DISCONTINUED | OUTPATIENT
Start: 2024-05-02 | End: 2024-05-10 | Stop reason: HOSPADM

## 2024-05-02 RX ORDER — SODIUM CHLORIDE 0.9 % (FLUSH) 0.9 %
5-40 SYRINGE (ML) INJECTION EVERY 12 HOURS SCHEDULED
Status: DISCONTINUED | OUTPATIENT
Start: 2024-05-02 | End: 2024-05-10 | Stop reason: HOSPADM

## 2024-05-02 RX ORDER — HEPARIN SODIUM 5000 [USP'U]/ML
5000 INJECTION, SOLUTION INTRAVENOUS; SUBCUTANEOUS EVERY 8 HOURS SCHEDULED
Status: DISCONTINUED | OUTPATIENT
Start: 2024-05-02 | End: 2024-05-10 | Stop reason: HOSPADM

## 2024-05-02 RX ORDER — CARVEDILOL 3.12 MG/1
3.12 TABLET ORAL 2 TIMES DAILY WITH MEALS
Status: DISCONTINUED | OUTPATIENT
Start: 2024-05-02 | End: 2024-05-10 | Stop reason: HOSPADM

## 2024-05-02 RX ORDER — ACETAMINOPHEN 325 MG/1
650 TABLET ORAL EVERY 6 HOURS PRN
Status: DISCONTINUED | OUTPATIENT
Start: 2024-05-02 | End: 2024-05-10 | Stop reason: HOSPADM

## 2024-05-02 RX ORDER — OXYCODONE HYDROCHLORIDE AND ACETAMINOPHEN 5; 325 MG/1; MG/1
1 TABLET ORAL EVERY 6 HOURS PRN
Status: DISCONTINUED | OUTPATIENT
Start: 2024-05-02 | End: 2024-05-03

## 2024-05-02 RX ORDER — POTASSIUM CHLORIDE 750 MG/1
10 TABLET, EXTENDED RELEASE ORAL DAILY
Status: DISCONTINUED | OUTPATIENT
Start: 2024-05-02 | End: 2024-05-03

## 2024-05-02 RX ORDER — FUROSEMIDE 10 MG/ML
40 INJECTION INTRAMUSCULAR; INTRAVENOUS 2 TIMES DAILY
Status: DISCONTINUED | OUTPATIENT
Start: 2024-05-02 | End: 2024-05-03

## 2024-05-02 RX ORDER — PANTOPRAZOLE SODIUM 40 MG/1
40 TABLET, DELAYED RELEASE ORAL DAILY
Status: DISCONTINUED | OUTPATIENT
Start: 2024-05-02 | End: 2024-05-10 | Stop reason: HOSPADM

## 2024-05-02 RX ADMIN — CARVEDILOL 3.12 MG: 3.12 TABLET, FILM COATED ORAL at 18:51

## 2024-05-02 RX ADMIN — FUROSEMIDE 40 MG: 10 INJECTION, SOLUTION INTRAMUSCULAR; INTRAVENOUS at 18:51

## 2024-05-02 RX ADMIN — HEPARIN SODIUM 5000 UNITS: 5000 INJECTION INTRAVENOUS; SUBCUTANEOUS at 20:50

## 2024-05-02 RX ADMIN — BRIMONIDINE TARTRATE 1 DROP: 2 SOLUTION OPHTHALMIC at 20:51

## 2024-05-02 RX ADMIN — INSULIN GLARGINE 40 UNITS: 100 INJECTION, SOLUTION SUBCUTANEOUS at 21:30

## 2024-05-02 RX ADMIN — TIMOLOL MALEATE 1 DROP: 5 SOLUTION OPHTHALMIC at 20:51

## 2024-05-02 RX ADMIN — INSULIN LISPRO 4 UNITS: 100 INJECTION, SOLUTION INTRAVENOUS; SUBCUTANEOUS at 21:30

## 2024-05-02 RX ADMIN — Medication 10 ML: at 20:52

## 2024-05-02 RX ADMIN — DOCUSATE SODIUM 100 MG: 100 CAPSULE, LIQUID FILLED ORAL at 20:51

## 2024-05-02 RX ADMIN — ATORVASTATIN CALCIUM 80 MG: 80 TABLET, FILM COATED ORAL at 20:51

## 2024-05-02 RX ADMIN — PREGABALIN 50 MG: 25 CAPSULE ORAL at 20:51

## 2024-05-02 RX ADMIN — INSULIN LISPRO 2 UNITS: 100 INJECTION, SOLUTION INTRAVENOUS; SUBCUTANEOUS at 18:48

## 2024-05-02 RX ADMIN — FUROSEMIDE 40 MG: 10 INJECTION, SOLUTION INTRAMUSCULAR; INTRAVENOUS at 15:17

## 2024-05-02 RX ADMIN — ACETAMINOPHEN 650 MG: 325 TABLET ORAL at 21:38

## 2024-05-02 RX ADMIN — DORZOLAMIDE HYDROCHLORIDE 1 DROP: 20 SOLUTION/ DROPS OPHTHALMIC at 20:52

## 2024-05-02 ASSESSMENT — PAIN DESCRIPTION - DESCRIPTORS
DESCRIPTORS: ACHING
DESCRIPTORS: ACHING

## 2024-05-02 ASSESSMENT — PAIN SCALES - GENERAL
PAINLEVEL_OUTOF10: 7
PAINLEVEL_OUTOF10: 3
PAINLEVEL_OUTOF10: 3

## 2024-05-02 ASSESSMENT — PAIN DESCRIPTION - ORIENTATION
ORIENTATION: MID
ORIENTATION: MID
ORIENTATION: RIGHT;LEFT

## 2024-05-02 ASSESSMENT — PAIN DESCRIPTION - LOCATION
LOCATION: HEAD
LOCATION: HEAD
LOCATION: FOOT;BACK

## 2024-05-02 NOTE — ED PROVIDER NOTES
THIS IS MY JENNIE SUPERVISORY AND SHARED VISIT NOTE:    I personally saw the patient and made/approved the management plan and take responsibility for the patient management.    Labs Reviewed   CBC WITH AUTO DIFFERENTIAL - Abnormal; Notable for the following components:       Result Value    RDW 19.4 (*)     Platelets 122 (*)     Lymphocytes Absolute 0.6 (*)     All other components within normal limits   COMPREHENSIVE METABOLIC PANEL W/ REFLEX TO MG FOR LOW K - Abnormal; Notable for the following components:    Sodium 130 (*)     Chloride 90 (*)     Glucose 307 (*)      (*)     Creatinine 1.7 (*)     Est, Glom Filt Rate 43 (*)     All other components within normal limits    Narrative:     CALL  [facility] tel. [phone],  Chemistry results called to and read back by mago hurtado rn, 05/02/2024  14:14, by DIMITRIOS   TROPONIN - Abnormal; Notable for the following components:    Troponin, High Sensitivity 92 (*)     All other components within normal limits    Narrative:     CALL  [facility] tel. [phone],  Chemistry results called to and read back by mago hurtado rn, 05/02/2024  14:14, by DIMITRIOS   BRAIN NATRIURETIC PEPTIDE - Abnormal; Notable for the following components:    Pro-BNP 3,596 (*)     All other components within normal limits    Narrative:     [facility] [phone],  Chemistry results called to and read back by mago hurtado rn, 05/02/2024  14:14, by DIMITRIOS   BLOOD GAS, VENOUS - Abnormal; Notable for the following components:    pH, Angel 7.343 (*)     pCO2, Angel 55.5 (*)     pO2, Angel 50.3 (*)     HCO3, Venous 29.5 (*)     Carboxyhemoglobin 3.6 (*)     All other components within normal limits   PROCALCITONIN - Abnormal; Notable for the following components:    Procalcitonin 0.29 (*)     All other components within normal limits    Narrative:     CALL  Jasso  [facility] tel. [phone],  Chemistry results called to and read back by mago hurtado rn, 05/02/2024  14:14, by DIMITRIOS   TROPONIN -

## 2024-05-02 NOTE — PROGRESS NOTES
Patient admitted to room 213 from ED.  Patient oriented to room, call light, bed rails, phone, lights and bathroom.  Patient instructed about the schedule of the day including: vital sign frequency, lab draws, possible tests, frequency of MD and staff rounds, including RN/MD rounding together at bedside, daily weights, and I &O's.  Patient instructed about prescribed diet, how to call and order meals, and television.   bed alarm in place, patient aware of placement and reason.  Telemetry box  in place, patient aware of placement and reason.  Bed locked, in lowest position, side rails up 2/4, call light within reach.  Will continue to monitor.      Vitals:    05/02/24 1719   BP: 122/72   Pulse: 90   Resp: 18   Temp: 97.7 °F (36.5 °C)   SpO2: 96%

## 2024-05-02 NOTE — ED PROVIDER NOTES
[facility] A2 CARD TELEMETRY  EMERGENCY DEPARTMENT ENCOUNTER        Pt Name: Shay Townsend  MRN: [id number]  Birthdate 1955  Date of evaluation: 5/2/2024  Provider: Radha Kyle PA-C  PCP: Julia Stein MD  Note Started: 3:03 PM EDT 5/2/24       I have seen and evaluated this patient with my supervising physician Olivia Majano MD.      CHIEF COMPLAINT       Chief Complaint   Patient presents with    Shortness of Breath     10 lb weight gain past week, placed on 3L this past week, usually does not wear oxygen, increasing sob, resides at [facility],        HISTORY OF PRESENT ILLNESS: 1 or more Elements     History from : Patient    Limitations to history : None    Shay Townsend is a 69 y.o. male with a past medical history of CHF, MARGARITO, DM 2, HTN, HLD, obesity, A-fib, HENNA who presents to the emergency room via EMS from [facility] for evaluation of an acute increase in his weight he reports he has gained 20 pounds over the past 2 weeks.  Associated with shortness of breath.  Patient is hypoxic on room air.  Patient does not typically require oxygen.  Patient advised that he just saw his nephrologist who advised him he needs a paracentesis.  Denies fevers, chills denies sick contacts, denies cough   Will refuse urinary catheter, advised he will only pee standing up and will need someone to stand with him and help him pee    Nursing Notes were all reviewed and agreed with or any disagreements were addressed in the HPI.    REVIEW OF SYSTEMS :      Review of Systems   Constitutional:  Negative for chills and fever.   HENT: Negative.  Negative for congestion, rhinorrhea, sinus pressure, sinus pain and sore throat.    Eyes:  Negative for discharge, redness and visual disturbance.   Respiratory:  Positive for shortness of breath. Negative for cough and chest tightness.    Cardiovascular:  Positive for leg swelling. Negative for chest pain and palpitations.   Gastrointestinal:  Negative for    COVID-19 & INFLUENZA COMBO   LACTIC ACID   HEMOGLOBIN A1C   TSH   T4, FREE   TROPONIN   LIPID PANEL   HEMOGLOBIN A1C   POCT GLUCOSE   POCT GLUCOSE   POCT GLUCOSE   POCT GLUCOSE   POCT GLUCOSE   POCT GLUCOSE   POCT GLUCOSE   POCT GLUCOSE   POCT GLUCOSE   POCT GLUCOSE       When ordered only abnormal lab results are displayed. All other labs were within normal range or not returned as of this dictation.    EKG: When ordered, EKG's are interpreted by the Emergency Department Physician in the absence of a cardiologist.  Please see their note for interpretation of EKG.    RADIOLOGY:   Non-plain film images such as CT, Ultrasound and MRI are read by the radiologist. Plain radiographic images are visualized and preliminarily interpreted by the ED Provider with the below findings:      Interpretation per the Radiologist below, if available at the time of this note:    XR CHEST (2 VW)   Final Result   Mild cardiomegaly and mild central pulmonary congestion which is more   prominent      Hazy perihilar bibasilar interstitial ground-glass opacity which is slightly   more prominent may be related to pulmonary edema and/or early infiltrates   from pneumonia.  Recommend follow-up.             CRITICAL CARE TIME (.cctime)   Because of high probability of sudden clinical deterioration of the patient's condition and risk of further deterioration, critical care time required my full attention to the patient's condition; which included chart data review, documentation, medication ordering, reviewing the patient's old records, reevaluation patient's cardiac, pulmonary and neurological status.  Reevaluation of vital signs. Consultations with ED attending and admitting physician. Ordering, interpreting reviewing diagnostic testing.  Therefore, I personally saw the patient and independently provided 31 minutes of non-concurrent critical care out of the total shared critical care time provided, direct attention to the patient's condition

## 2024-05-02 NOTE — CONSULTS
Consult Placed     Who: [facility]   Date: 5/2/2024  Time: 1745     Electronically signed by Millie Cohen RN on 5/2/2024 at 5:45 PM

## 2024-05-02 NOTE — ED NOTES
Mr. Townsend is a 69 y.o. male who had concerns including Shortness of Breath (10 lb weight gain past week, placed on 3L this past week, usually does not wear oxygen, increasing sob, resides at [facility], ).    Chief Complaint   Patient presents with    Shortness of Breath     10 lb weight gain past week, placed on 3L this past week, usually does not wear oxygen, increasing sob, resides at [facility],        He is being admitted for:    CHF NYHA class I, acute, diastolic ([facility])    His ED problem list included:    1. Acute on chronic congestive heart failure, unspecified heart failure type ([facility])    2. Acute worsening of stage 3 chronic kidney disease ([facility])    3. Dysphagia, unspecified type        Past Medical History:   Diagnosis Date    Anemia     Angina     Arthritis     CAD (coronary artery disease)     CHF (congestive heart failure) ([facility])     CKD (chronic kidney disease) stage 3, GFR 30-59 ml/min ([facility])     Clostridium difficile diarrhea 3/16/12; 2/29/12    positive stool toxin    Diabetes mellitus ([facility])     Diabetic neuropathy ([facility])     feet and legs    Disease of blood and blood forming organ     Dizziness     When he moves his head/ loses his balance    GERD (gastroesophageal reflux disease)     gastric ulcer    Headache     Hearing loss     Hyperlipidemia     Hypertension     Kidney stone 2002    Refusal of blood transfusions as patient is Yazdanism     Sleep apnea     Tinnitus     Venous ulcer ([facility])     LLE    Wound, open 1/13/2012       Past Surgical History:   Procedure Laterality Date    CARDIAC SURGERY      Dec 27 2010, triple bypass    CHOLECYSTECTOMY  9/2011    HERNIA REPAIR  age2    KNEE ARTHROCENTESIS Right 10/6/2022    RIGHT SHOULDER INTRA ARTICULAR INJECTION SITE CONFIRMED BY FLUOROSCOPY performed by Bjorn Burciaga MD [place] [facility]    OTHER SURGICAL HISTORY  11-16-11 REPAIR LOWER STERNAL INCISION, REMOVAL OF ONE STERNAL WIRE,    OTHER SURGICAL HISTORY  10/10/2014    phacoemulsification

## 2024-05-02 NOTE — DISCHARGE INSTR - COC
Mobility/ADLs:  Walking   Assisted  Transfer  Assisted  Bathing  Assisted  Dressing  Assisted  Toileting  Assisted  Feeding  Independent  Med Admin  Assisted  Med Delivery   whole    Wound Care Documentation and Therapy:  Wound 05/23/23 Pretibial Right;Left medial right leg fluid filled blister and lateral left leg couple fluid filled blisters (Active)   Number of days: 345        Elimination:  Continence:   Bowel: Yes  Bladder: No  Urinary Catheter: None   Colostomy/Ileostomy/Ileal Conduit: No       Date of Last BM: 5/9  No intake or output data in the 24 hours ending 05/02/24 1518  No intake/output data recorded.    Safety Concerns:     At Risk for Falls    Impairments/Disabilities:      Vision and Hearing    Nutrition Therapy:  Current Nutrition Therapy:   - Oral Diet:  Carb Control 5 carbs/meal (2000kcals/day), Low Sodium (2gm), and Double protein portions    Routes of Feeding: Oral  Liquids: No Restrictions  Daily Fluid Restriction: yes - amount 1800  Last Modified Barium Swallow with Video (Video Swallowing Test): not done    Treatments at the Time of Hospital Discharge:   Respiratory Treatments:   Oxygen Therapy:  is not on home oxygen therapy.  Ventilator:    - No ventilator support    Rehab Therapies: Physical Therapy and Occupational Therapy  Weight Bearing Status/Restrictions: No weight bearing restrictions  Other Medical Equipment (for information only, NOT a DME order):  walker  Other Treatments:    Patient's personal belongings (please select all that are sent with patient):  IPAd and     RN SIGNATURE:  Electronically signed by Erinn Granda RN on 5/10/24 at 3:50 PM EDT    CASE MANAGEMENT/SOCIAL WORK SECTION    Inpatient Status Date: ***    Readmission Risk Assessment Score:  Readmission Risk              Risk of Unplanned Readmission:  0           Discharging to Facility/ Agency   [facility]  Skilled Nursing  [address] [facility]  [address]  [phone]     / signature: Electronically signed by ABELINO Carney on 5/10/24 at 2:13 PM EDT    PHYSICIAN SECTION    Prognosis: {Prognosis:1607889490}    Condition at Discharge: { Patient Condition:891182540}    Rehab Potential (if transferring to Rehab): {Prognosis:5627473851}    Recommended Labs or Other Treatments After Discharge: ***    Physician Certification: I certify the above information and transfer of Shay Townsend  is necessary for the continuing treatment of the diagnosis listed and that he requires {Admit to Appropriate Level of Care:45477} for {GREATER/LESS:822691240} 30 days.     Update Admission H&P: {CHP DME Changes in HandP:856214576}    PHYSICIAN SIGNATURE:  {Esignature:[id number]}

## 2024-05-02 NOTE — PROGRESS NOTES
4 Eyes Skin Assessment     The patient is being assess for  Admission    I agree that 2 RN's have performed a thorough Head to Toe Skin Assessment on the patient. ALL assessment sites listed below have been assessed.       Areas assessed by both nurses: Montserrat & Melany  [x]   Head, Face, and Ears   [x]   Shoulders, Back, and Chest  [x]   Arms, Elbows, and Hands   [x]   Coccyx, Sacrum, and Ischum  [x]   Legs, Feet, and Heels        Does the Patient have Skin Breakdown?  Yes a wound was noted on the Admission Assessment and an WOUND LDA was Initiated documentation include the Radha-wound, Wound Assessment, Measurements, Dressing Treatment, Drainage, and Color\",         John Prevention initiated:  Yes   Wound Care Orders initiated:  No      WOC nurse consulted for Pressure Injury (Stage 3,4, Unstageable, DTI, NWPT, and Complex wounds):  Yes      Nurse 1 eSignature: Electronically signed by Millie Cohen RN on 5/2/24 at 6:46 PM EDT    **SHARE this note so that the co-signing nurse is able to place an eSignature**    Nurse 2 eSignature: Electronically signed by Melany Larson RN on 5/2/24 at 7:24 PM EDT

## 2024-05-02 NOTE — PLAN OF CARE
Problem: Discharge Planning  Goal: Discharge to home or other facility with appropriate resources  Outcome: Progressing  Note:   CHF Care Plan      Patient's EF (Ejection Fraction) is greater than 40%    Heart Failure Medications:  Diuretics:: Furosemide and Spironolactone    (One of the following REQUIRED for EF </= 40%/SYSTOLIC FAILURE but MAY be used in EF% >40%/DIASTOLIC FAILURE)        ACE:: None        ARB:: None         ARNI:: None    (Beta Blockers)  NON- Evidenced Based Beta Blocker (for EF% >40%/DIASTOLIC FAILURE): None    Evidenced Based Beta Blocker::(REQUIRED for EF% <40%/SYSTOLIC FAILURE) Carvedilol- Coreg  ...................................................................................................................................................    Failed to redirect to the Timeline version of the [employer] SmartLink.      Patient's weights and intake/output reviewed    Daily Weight log at bedside, patient/family participation in use of log: \"yes    Patient's current weight today shows a difference of 0 lbs less than last documented weight.      Intake/Output Summary (Last 24 hours) at 5/2/2024 1849  Last data filed at 5/2/2024 1840  Gross per 24 hour   Intake 240 ml   Output --   Net 240 ml       Education Booklet Provided: yes    Comorbidities Reviewed Yes    Patient has a past medical history of Anemia, Angina, Arthritis, CAD (coronary artery disease), CHF (congestive heart failure) ([facility]), CKD (chronic kidney disease) stage 3, GFR 30-59 ml/min ([facility]), Clostridium difficile diarrhea, Diabetes mellitus (HCC), Diabetic neuropathy ([facility]), Disease of blood and blood forming organ, Dizziness, GERD (gastroesophageal reflux disease), Headache, Hearing loss, Hyperlipidemia, Hypertension, Kidney stone, Refusal of blood transfusions as patient is Gnosticism, Sleep apnea, Tinnitus, Venous ulcer (HCC), and Wound, open.     >>For CHF and Comorbidity documentation on Education Time and Topics, please see  before meals and at bedtime with sliding scale insulin in place for coverage. Will continue to monitor for signs and symptoms of hypoglycemia and hyperglycemia throughout shift.

## 2024-05-02 NOTE — H&P
Hospital Medicine History & Physical      Date of Admission: 5/2/2024    Date of Service:  Pt seen/examined on 5/2/2024      [x]Admitted to Inpatient with expected LOS greater than two midnights due to medical therapy.  []Placed in Observation status.    Chief Admission Complaint:   sob    Presenting Admission History:      69 y.o. male who presented to [facility] with  above c/o ..  PMHx significant for CKD diastolic CHF CAD CABG diabetes hypertension and hyperlipidemia.  He is a long-term care resident and started with shortness of breath few days ago  Is worsened and currently he needs oxygen  He was sent to the emergency room for further evaluation  He is on 3 L oxygen and comfortable now  He denies chest pain cough sputum fever change in mental status nausea vomiting diarrhea or any urinary complaints.  He feels his stomach is more and more distended   He has chronic venous stasis with edema and erythematous changes over lower extremities  He is a Jehovah witness    Assessment/Plan:      Current Principal Problem:  CHF NYHA class I, acute, diastolic (HCC)    Acute diastolic congestive heart failure-admit to telemetry troponin IV Lasix monitor BMP intake and output Daily weight repeat echocardiogram cardiology evaluation  Given CKD and very sensitive to Lasix ,nephrology evaluation  Continue home Aldactone  Echocardiogram done in 2023 ejection fraction 50 to 55% and diastolic dysfunction grade 3    History of CAD CABG-troponins mildly elevated, trend, cardiology on board    Mild thrombocytopenia-monitor    Acute hypoxic respiratory failure secondary to above-titrate the oxygen    Chronic kidney disease stage III-creatinine 1.7 BUN more than 100-stable  Given diuretics, nephrology was consulted  Lasix infusion and paracentesis deferred to nephrology    Diabetes-continue home insulin monitor with low pressure scale insulin hemoglobin A1c diabetic diet    History of hypertension and hyperlipidemia-continue  home medication    Elevated procalcitonin-no evidence of infection clinically  Chronically elevated?    Chronic venous stasis with chronic color change  Jehovah witness      Discussed management and the need for Hospitalization of the patient w/ the Emergency Department Provider: Tyron MACIAS    CXR: I have reviewed the CXR with the following interpretation: Cardiomegaly with congestion-my reading  EKG:  I have reviewed the EKG with the following interpretation: Accelerated junctional rhythm 90/min    Physical Exam Performed:      /74   Pulse 89   Temp 98 °F (36.7 °C) (Oral)   Resp 16   Ht 1.727 m (5' 8\")   Wt 123.8 kg (273 lb)   SpO2 94%   BMI 41.51 kg/m²     General appearance:  No apparent distress, appears stated age and cooperative.  Obese  HEENT:  Conjunctivae/corneas clear.  Respiratory:  Normal respiratory effort.  bilaterally with Rales/ no Wheezes/Rhonchi.  Cardiovascular:  Regular rate and rhythm with normal S1/S2 without murmurs, rubs or gallops.  Abdomen:  Soft, non-tender, -distended with normal bowel sounds.  Obese  Musculoskeletal:  No clubbing, cyanosis has 2+ edema bilaterally.  Full range of motion without deformity.  Neurologic:  Neurovascularly intact without any focal s/motor deficits.  grossly non-focal.  Psychiatric:  Alert and oriented, thought content appropriate, normal insight  Skin:  Skin color, texture, turgor normal.  Bilateral lower extremity edema and chronic color change few blisters fluid-filled  Capillary Refill:  Brisk,< 3 seconds   Peripheral Pulses:  +2 palpable, equal bilaterally     Diet: []Adult/General  [x]Cardiac  [x]Diabetic  []Low Fat  []NPO  []NPO after Midnight  []Other   DVT Prophylaxis: []PPx LMWH  [x]SQ Heparin  []IPC/SCDs  []Eliquis  []Xarelto  []Coumadin     Code status: [x]Full  []DNR/CCA  []Limited (DNR/CCA with Do Not Intubate)  []DNRCC  Surrogate Decision Maker: Self  PT/OT Eval Status:   [x]NOT yet ordered  []Ordered and Pending   []Seen with

## 2024-05-02 NOTE — CONSULTS
Consult Placed     Who: Nephrology- Dr. Arreola  Date: 5/2/2024  Time: 1747     Electronically signed by Millie Cohen RN on 5/2/2024 at 5:47 PM

## 2024-05-03 ENCOUNTER — APPOINTMENT (OUTPATIENT)
Dept: ULTRASOUND IMAGING | Age: 69
End: 2024-05-03
Payer: MEDICARE

## 2024-05-03 LAB
ANION GAP SERPL CALCULATED.3IONS-SCNC: 12 MMOL/L (ref 3–16)
BASOPHILS # BLD: 0 K/UL (ref 0–0.2)
BASOPHILS NFR BLD: 0.6 %
BUN SERPL-MCNC: 105 MG/DL (ref 7–20)
CALCIUM SERPL-MCNC: 9 MG/DL (ref 8.3–10.6)
CHLORIDE SERPL-SCNC: 92 MMOL/L (ref 99–110)
CHOLEST SERPL-MCNC: 77 MG/DL (ref 0–199)
CO2 SERPL-SCNC: 32 MMOL/L (ref 21–32)
CREAT SERPL-MCNC: 1.5 MG/DL (ref 0.8–1.3)
DEPRECATED RDW RBC AUTO: 19.9 % (ref 12.4–15.4)
EOSINOPHIL # BLD: 0.3 K/UL (ref 0–0.6)
EOSINOPHIL NFR BLD: 4.8 %
EST. AVERAGE GLUCOSE BLD GHB EST-MCNC: 226 MG/DL
EST. AVERAGE GLUCOSE BLD GHB EST-MCNC: 226 MG/DL
GFR SERPLBLD CREATININE-BSD FMLA CKD-EPI: 50 ML/MIN/{1.73_M2}
GLUCOSE BLD-MCNC: 215 MG/DL (ref 70–99)
GLUCOSE BLD-MCNC: 222 MG/DL (ref 70–99)
GLUCOSE BLD-MCNC: 245 MG/DL (ref 70–99)
GLUCOSE BLD-MCNC: 269 MG/DL (ref 70–99)
GLUCOSE SERPL-MCNC: 193 MG/DL (ref 70–99)
HBA1C MFR BLD: 9.5 %
HBA1C MFR BLD: 9.5 %
HCT VFR BLD AUTO: 42.4 % (ref 40.5–52.5)
HDLC SERPL-MCNC: 35 MG/DL (ref 40–60)
HGB BLD-MCNC: 13.6 G/DL (ref 13.5–17.5)
LDLC SERPL CALC-MCNC: 27 MG/DL
LYMPHOCYTES # BLD: 0.6 K/UL (ref 1–5.1)
LYMPHOCYTES NFR BLD: 9.5 %
MAGNESIUM SERPL-MCNC: 2.8 MG/DL (ref 1.8–2.4)
MCH RBC QN AUTO: 27.7 PG (ref 26–34)
MCHC RBC AUTO-ENTMCNC: 32.1 G/DL (ref 31–36)
MCV RBC AUTO: 86.2 FL (ref 80–100)
MONOCYTES # BLD: 0.6 K/UL (ref 0–1.3)
MONOCYTES NFR BLD: 9.2 %
NEUTROPHILS # BLD: 4.7 K/UL (ref 1.7–7.7)
NEUTROPHILS NFR BLD: 75.9 %
PERFORMED ON: ABNORMAL
PLATELET # BLD AUTO: 122 K/UL (ref 135–450)
PMV BLD AUTO: 8.8 FL (ref 5–10.5)
POTASSIUM SERPL-SCNC: 3.7 MMOL/L (ref 3.5–5.1)
RBC # BLD AUTO: 4.92 M/UL (ref 4.2–5.9)
SODIUM SERPL-SCNC: 136 MMOL/L (ref 136–145)
T4 FREE SERPL-MCNC: 1.8 NG/DL (ref 0.9–1.8)
TRIGL SERPL-MCNC: 73 MG/DL (ref 0–150)
TSH SERPL DL<=0.005 MIU/L-ACNC: 0.76 UIU/ML (ref 0.27–4.2)
VLDLC SERPL CALC-MCNC: 15 MG/DL
WBC # BLD AUTO: 6.2 K/UL (ref 4–11)

## 2024-05-03 PROCEDURE — 6370000000 HC RX 637 (ALT 250 FOR IP): Performed by: INTERNAL MEDICINE

## 2024-05-03 PROCEDURE — 6360000002 HC RX W HCPCS: Performed by: INTERNAL MEDICINE

## 2024-05-03 PROCEDURE — 2580000003 HC RX 258: Performed by: INTERNAL MEDICINE

## 2024-05-03 PROCEDURE — 80048 BASIC METABOLIC PNL TOTAL CA: CPT

## 2024-05-03 PROCEDURE — 85025 COMPLETE CBC W/AUTO DIFF WBC: CPT

## 2024-05-03 PROCEDURE — 6370000000 HC RX 637 (ALT 250 FOR IP): Performed by: NURSE PRACTITIONER

## 2024-05-03 PROCEDURE — 76705 ECHO EXAM OF ABDOMEN: CPT

## 2024-05-03 PROCEDURE — 1200000000 HC SEMI PRIVATE

## 2024-05-03 PROCEDURE — 94761 N-INVAS EAR/PLS OXIMETRY MLT: CPT

## 2024-05-03 PROCEDURE — 2700000000 HC OXYGEN THERAPY PER DAY

## 2024-05-03 PROCEDURE — 99223 1ST HOSP IP/OBS HIGH 75: CPT | Performed by: INTERNAL MEDICINE

## 2024-05-03 PROCEDURE — 83735 ASSAY OF MAGNESIUM: CPT

## 2024-05-03 PROCEDURE — 83036 HEMOGLOBIN GLYCOSYLATED A1C: CPT

## 2024-05-03 PROCEDURE — C8929 TTE W OR WO FOL WCON,DOPPLER: HCPCS

## 2024-05-03 PROCEDURE — 80061 LIPID PANEL: CPT

## 2024-05-03 RX ORDER — OXYCODONE HYDROCHLORIDE AND ACETAMINOPHEN 5; 325 MG/1; MG/1
1 TABLET ORAL EVERY 4 HOURS PRN
Status: DISCONTINUED | OUTPATIENT
Start: 2024-05-03 | End: 2024-05-10 | Stop reason: HOSPADM

## 2024-05-03 RX ADMIN — PANTOPRAZOLE SODIUM 40 MG: 40 TABLET, DELAYED RELEASE ORAL at 10:10

## 2024-05-03 RX ADMIN — TAMSULOSIN HYDROCHLORIDE 0.8 MG: 0.4 CAPSULE ORAL at 05:11

## 2024-05-03 RX ADMIN — PREGABALIN 50 MG: 25 CAPSULE ORAL at 20:52

## 2024-05-03 RX ADMIN — BRIMONIDINE TARTRATE 1 DROP: 2 SOLUTION OPHTHALMIC at 10:13

## 2024-05-03 RX ADMIN — INSULIN GLARGINE 40 UNITS: 100 INJECTION, SOLUTION SUBCUTANEOUS at 20:57

## 2024-05-03 RX ADMIN — INSULIN LISPRO 1 UNITS: 100 INJECTION, SOLUTION INTRAVENOUS; SUBCUTANEOUS at 16:48

## 2024-05-03 RX ADMIN — INSULIN LISPRO 1 UNITS: 100 INJECTION, SOLUTION INTRAVENOUS; SUBCUTANEOUS at 09:45

## 2024-05-03 RX ADMIN — DORZOLAMIDE HYDROCHLORIDE 1 DROP: 20 SOLUTION/ DROPS OPHTHALMIC at 21:01

## 2024-05-03 RX ADMIN — TIMOLOL MALEATE 1 DROP: 5 SOLUTION OPHTHALMIC at 10:13

## 2024-05-03 RX ADMIN — ALLOPURINOL 300 MG: 300 TABLET ORAL at 10:10

## 2024-05-03 RX ADMIN — ATORVASTATIN CALCIUM 80 MG: 80 TABLET, FILM COATED ORAL at 20:52

## 2024-05-03 RX ADMIN — HEPARIN SODIUM 5000 UNITS: 5000 INJECTION INTRAVENOUS; SUBCUTANEOUS at 20:51

## 2024-05-03 RX ADMIN — BRIMONIDINE TARTRATE 1 DROP: 2 SOLUTION OPHTHALMIC at 20:51

## 2024-05-03 RX ADMIN — OXYCODONE AND ACETAMINOPHEN 1 TABLET: 5; 325 TABLET ORAL at 20:52

## 2024-05-03 RX ADMIN — FUROSEMIDE 5 MG/HR: 10 INJECTION, SOLUTION INTRAMUSCULAR; INTRAVENOUS at 10:06

## 2024-05-03 RX ADMIN — HEPARIN SODIUM 5000 UNITS: 5000 INJECTION INTRAVENOUS; SUBCUTANEOUS at 05:11

## 2024-05-03 RX ADMIN — Medication 10 ML: at 10:07

## 2024-05-03 RX ADMIN — DORZOLAMIDE HYDROCHLORIDE 1 DROP: 20 SOLUTION/ DROPS OPHTHALMIC at 10:13

## 2024-05-03 RX ADMIN — DOCUSATE SODIUM 100 MG: 100 CAPSULE, LIQUID FILLED ORAL at 20:52

## 2024-05-03 RX ADMIN — DOCUSATE SODIUM 100 MG: 100 CAPSULE, LIQUID FILLED ORAL at 10:10

## 2024-05-03 RX ADMIN — PREGABALIN 50 MG: 25 CAPSULE ORAL at 10:11

## 2024-05-03 RX ADMIN — INSULIN LISPRO 1 UNITS: 100 INJECTION, SOLUTION INTRAVENOUS; SUBCUTANEOUS at 12:14

## 2024-05-03 RX ADMIN — ASPIRIN 81 MG: 81 TABLET, COATED ORAL at 10:11

## 2024-05-03 RX ADMIN — OXYCODONE AND ACETAMINOPHEN 1 TABLET: 5; 325 TABLET ORAL at 16:48

## 2024-05-03 RX ADMIN — TIMOLOL MALEATE 1 DROP: 5 SOLUTION OPHTHALMIC at 20:57

## 2024-05-03 RX ADMIN — HEPARIN SODIUM 5000 UNITS: 5000 INJECTION INTRAVENOUS; SUBCUTANEOUS at 14:13

## 2024-05-03 ASSESSMENT — PAIN DESCRIPTION - LOCATION: LOCATION: BACK;FOOT

## 2024-05-03 ASSESSMENT — PAIN SCALES - GENERAL
PAINLEVEL_OUTOF10: 0
PAINLEVEL_OUTOF10: 7
PAINLEVEL_OUTOF10: 0
PAINLEVEL_OUTOF10: 0

## 2024-05-03 ASSESSMENT — PAIN - FUNCTIONAL ASSESSMENT: PAIN_FUNCTIONAL_ASSESSMENT: ACTIVITIES ARE NOT PREVENTED

## 2024-05-03 ASSESSMENT — PAIN DESCRIPTION - ORIENTATION: ORIENTATION: RIGHT;LEFT

## 2024-05-03 ASSESSMENT — PAIN DESCRIPTION - DESCRIPTORS: DESCRIPTORS: ACHING

## 2024-05-03 NOTE — CONSULTS
[address] [address]                              CONSULTATION      PATIENT NAME: RICAHR BUSTAMANTE              : 1955  MED REC NO: [id number]                      ROOM: [id number]  ACCOUNT NO: [id number]                       ADMIT DATE: 2024  PROVIDER: Omi Hill MD      REASON FOR CONSULTATION:  Acute on chronic kidney disease.    HISTORY OF PRESENT ILLNESS:  The patient is a 69-year-old  male patient with a past medical history significant for chronic kidney disease and a baseline creatinine ranging between 1.5 and 1.7 mg/dL.  The patient presented to [facility] complaining of worsening shortness of breath, lower extremity edema, and progressive weight gain.  Upon presentation, he was noted to have an acute on chronic diastolic heart failure decompensation, for which he was started on IV Lasix.  Nephrology was consulted for further assistance with diuresis in the setting of chronic kidney disease.    PAST MEDICAL HISTORY:    1. Chronic kidney disease.  2. Coronary artery disease.  3. Diastolic heart failure.  4. Diabetes mellitus.  5. Hyperlipidemia.  6. Hypertension.  7. Nephrolithiasis.  8. Obstructive sleep apnea.    PAST SURGICAL HISTORY:    1. Cholecystectomy.  2. Hernia repair.  3. EGD.    ALLERGIES:  THE PATIENT IS ALLERGIC TO AMITRIPTYLINE, CELECOXIB, CYMBALTA, ELAVIL, GABAPENTIN, AND NONSTEROIDAL ANTI-INFLAMMATORY DRUGS.      SOCIAL HISTORY:  The patient does not smoke or drink alcohol.    FAMILY HISTORY:  Negative for kidney disease.    REVIEW OF SYSTEMS:  The patient denied any fever, chills, cough, or expectorations.  Otherwise, a 10-point review of system was relatively unremarkable.    PHYSICAL EXAMINATION:  VITAL SIGNS:  Blood pressure 115/69, heart rate 98, respirations 18, temperature 98.1 Fahrenheit.  The patient is satting 97% on 1 L nasal cannula.  GENERAL APPEARANCE:  The

## 2024-05-03 NOTE — CONSULTS
[facility]   CONSULTATION  [phone]        Reason for Consultation/Chief Complaint: \"I have been having SOB and weight gain .\"  Consulted by Dr. Carney for CHF  Last saw NP Patrice 1/15/24;  reports home weight 242#    History of Present Illness:    Shay Townsend is a 69 y.o. patient who presented to [facility] 5/2/24 with c/o SOB and weight gain. He has PMH chronic diastolic CHF, HTN, HLD, CAD s/p CABG, pulm HTN, CKD (Dr. Zaragoza), hx PAF (Catholic--declines AC), severe HENNA on nighttime O2, and fall s/p subdural hematoma 5/22. Prior testing: [place] nuc 3/9/22 large area mixed ischemia/scar inferoapical,apical-lateral, lateral, posterior-lateral, and posterior-basal segments; EF=67%. ECHO 8/26/22 EF=50-55%; mild LVH; grade 3 DD elevated pressure; AV appears functionally bicuspid fused left/non-coronary cusps; moderate AS (cachorro 3.82m/s; MPG 32mmHg); moderate AI   Now presents with above complaints. Reports 30# weight gain over last 3 months with abdominal distension (240# up to 270# now). He c/o SOB at rest and exertion but worse walking. He is compliant with torsemide 100mg qd. Uses metolazone 5mg PRN. Watches salt but admits to drinking more fluid than he should. EKG undetermined rhythm ?junctional or ectopic rhythm; PVC; NST change (SB on 11/23 EKG). CXR mild CM and mild central PVC; hazy perihilar IS GGO maybe pulm edema vs early PNA. Labs: BUN/Cr 108/1.7 (126/2.1 in 1/24); K+ 4.3; Na 130; Roseline 92, 89 (65-66 in 8/23); BNP 3596; normal H/H. Patient with no c/o CP, palpitations, dizziness, or orthopnea/PND. I have been asked to provide consultation regarding further management and testing.    Past Medical History:   has a past medical history of Anemia, Angina, Arthritis, CAD (coronary artery disease), CHF (congestive heart failure) (HCC), CKD (chronic kidney disease) stage 3, GFR 30-59 ml/min (HCC), Clostridium difficile diarrhea, Diabetes mellitus (HCC), Diabetic neuropathy (HCC), Disease  diuretic mgt given extent of CKD. Patient is trying to avoid HD.  ECHO pending to reevaluate AV disease. ? Worsened AS/AI contributing to decompensated CHF.  Continue baby asa qd, coreg 3.125mg BID, lipitor 80 qd, KCL 10 qd, aldactone 25 qd, flomax 0.8 qd    Note acute on chronic diastolic CHF, AS/AI, CAD, and CKD increase his morbidity and mortality requiring med mgt and cardiac testing.     Patient Active Problem List   Diagnosis    DM (diabetes mellitus) ([facility])    Coronary artery disease involving native heart without angina pectoris    Anemia of chronic disease    Essential hypertension    Hyperkalemia    Chronic kidney disease    Chronic diastolic heart failure ([facility])    Pulmonary hypertension due to left ventricular diastolic dysfunction ([facility])    Obesity    S/P CABG x 3    DM2 (diabetes mellitus, type 2) ([facility])    Morbid obesity with BMI of 50.0-59.9, adult ([facility])    HLD (hyperlipidemia)    Cardiomyopathy ([facility])    Productive cough    Chronic pain in right shoulder    Severe obstructive sleep apnea    Obesity, Class III, BMI 40-49.9 (morbid obesity) ([facility])    Acute diastolic congestive heart failure ([facility])    Degeneration of lumbar or lumbosacral intervertebral disc    Displacement of lumbar intervertebral disc without myelopathy    Lumbosacral spondylosis without myelopathy    Lumbar facet arthropathy    Disc displacement, lumbar    Spinal stenosis of lumbar region    Mixed conductive and sensorineural hearing loss of left ear with restricted hearing of right ear    Recurrent acute suppurative otitis media with spontaneous rupture of left tympanic membrane    Chest pain    Cor pulmonale, chronic ([facility])    Pulmonary hypertension due to alveolar hypoventilation disorder ([facility])    Aortic valve stenosis    Chronic neck pain    Dyspnea    Acute diastolic CHF (congestive heart failure) ([facility])    Cervical stenosis of spinal canal    Degeneration of cervical intervertebral disc    Cervical radiculitis    Acute on chronic  diastolic CHF (congestive heart failure) (HCC)    Acute respiratory failure with hypoxia (HCC)    Acute on chronic renal insufficiency    PAF (paroxysmal atrial fibrillation) (HCC)    Acute diastolic HF (heart failure) (HCC)    Subdural hematoma (HCC)    SDH (subdural hematoma) (HCC)    Acute on chronic respiratory failure with hypoxemia (HCC)    Fluid overload    MARGARITO (acute kidney injury) (HCC)    Epistaxis    Acute heart failure, unspecified heart failure type (HCC)    Weight gain    Acute on chronic congestive heart failure (HCC)    Acute on chronic diastolic (congestive) heart failure (HCC)    Anemia due to stage 3b chronic kidney disease (HCC)    Azotemia    Hyperglycemia due to type 2 diabetes mellitus (HCC)    Hyponatremia    CHF NYHA class I, acute, diastolic (HCC)      Thank you for allowing to us to participate in the care or Shay Townsend. Further evaluation will be based upon the patient's clinical course and testing results.

## 2024-05-03 NOTE — PLAN OF CARE
Problem: Pain  Goal: Verbalizes/displays adequate comfort level or baseline comfort level  5/3/2024 0458 by Judy Mcarthur RN  Outcome: Progressing     Problem: Skin/Tissue Integrity  Goal: Absence of new skin breakdown  Description: 1.  Monitor for areas of redness and/or skin breakdown  2.  Assess vascular access sites hourly  3.  Every 4-6 hours minimum:  Change oxygen saturation probe site  4.  Every 4-6 hours:  If on nasal continuous positive airway pressure, respiratory therapy assess nares and determine need for appliance change or resting period.  5/3/2024 0458 by Judy Mcarthur RN  Outcome: Progressing     Problem: Safety - Adult  Goal: Free from fall injury  5/3/2024 0458 by Judy Mcarthur RN  Outcome: Progressing     Problem: Chronic Conditions and Co-morbidities  Goal: Patient's chronic conditions and co-morbidity symptoms are monitored and maintained or improved  5/3/2024 0458 by Judy Mcarthur RN  Outcome: Progressing   CHF Care Plan      Patient's EF (Ejection Fraction) is greater than 40%    Heart Failure Medications:  Diuretics:: Furosemide and Spironolactone    (One of the following REQUIRED for EF </= 40%/SYSTOLIC FAILURE but MAY be used in EF% >40%/DIASTOLIC FAILURE)        ACE:: None        ARB:: None         ARNI:: None    (Beta Blockers)  NON- Evidenced Based Beta Blocker (for EF% >40%/DIASTOLIC FAILURE): None    Evidenced Based Beta Blocker::(REQUIRED for EF% <40%/SYSTOLIC FAILURE) Carvedilol- Coreg  ...................................................................................................................................................    Failed to redirect to the Timeline version of the [employer].      Patient's weights and intake/output reviewed    Daily Weight log at bedside, patient/family participation in use of log: \"yes    Patient's current weight today shows a difference of 11 lbs less than last documented weight.      Intake/Output Summary (Last 24 hours) at  5/3/2024 0458  Last data filed at 5/3/2024 0451  Gross per 24 hour   Intake 240 ml   Output 2600 ml   Net -2360 ml       Education Booklet Provided: yes    Comorbidities Reviewed Yes    Patient has a past medical history of Anemia, Angina, Arthritis, CAD (coronary artery disease), CHF (congestive heart failure) ([facility]), CKD (chronic kidney disease) stage 3, GFR 30-59 ml/min (HCC), Clostridium difficile diarrhea, Diabetes mellitus (HCC), Diabetic neuropathy ([facility]), Disease of blood and blood forming organ, Dizziness, GERD (gastroesophageal reflux disease), Headache, Hearing loss, Hyperlipidemia, Hypertension, Kidney stone, Refusal of blood transfusions as patient is Mosque, Sleep apnea, Tinnitus, Venous ulcer ([facility]), and Wound, open.     >>For CHF and Comorbidity documentation on Education Time and Topics, please see Education Tab      Pt resting in bed at this time on  2 L O2. Pt denies shortness of breath. Pt with pitting lower extremity edema.     Patient and/or Family's stated Goal of Care this Admission: increase activity tolerance, better understand heart failure and disease management, be more comfortable, and reduce lower extremity edema prior to discharge        :

## 2024-05-03 NOTE — CONSULTS
[facility] Contin[facility] Nurse  Consult Note       NAME:  Shay Townsend  MEDICAL RECORD NUMBER:  [id number]  AGE: 69 y.o.   GENDER: male  : 1955  TODAY'S DATE:  5/3/2024    Subjective;  Is Franchesca  still here?   Yes if she has time I'd like to say arvind.   Reason for WOCN Evaluation and Assessment:        healing venous wounds BLE      Shay Townsend is a 69 y.o. male referred by:   [] Physician  [x] Nursing  [] Other:     This wound care nurse saw patient 2023 & 22 for bi lateral lower legs.  Has had fluid filled blisters before also.  Patient concerned over bi lateral groins.   Wound Care evaluated groins and abdomen fold to be normal skin tone.   Intra dry sheets have already been placed as protection measure.      Wound Identification:  Wound Type: venous and diabetic  Contributing Factors: venous stasis, diabetes, poor glucose control, chronic pressure, decreased mobility, shear force, and obesity    Wound History: 69 y.o. male who presented to [facility] with  above c/o ..  PMHx significant for CKD diastolic CHF CAD CABG diabetes hypertension and hyperlipidemia.  He is a long-term care resident and started with shortness of breath few days ago  Is worsened and currently he needs oxygen  Current Wound Care Treatment:  LAWANDA    Patient Goal of Care:  [x] Wound Healing  [] Odor Control  [] Palliative Care  [x] Pain Control   [] Other:         PAST MEDICAL HISTORY        Diagnosis Date    Anemia     Angina     Arthritis     CAD (coronary artery disease)     CHF (congestive heart failure) ([facility])     CKD (chronic kidney disease) stage 3, GFR 30-59 ml/min ([facility])     Clostridium difficile diarrhea 3/16/12; 12    positive stool toxin    Diabetes mellitus ([facility])     Diabetic neuropathy ([facility])     feet and legs    Disease of blood and blood forming organ     Dizziness     When he moves his head/ loses his balance    GERD (gastroesophageal reflux disease)     gastric ulcer    Headache      ___ O'Clock 0 05/03/24 0835   Undermining Ends___ O'Clock 0 05/03/24 0835   Undermining Maxium Distance (cm) 0 05/03/24 0835   Wound Assessment Fluid filled blister 05/03/24 0835   Drainage Amount None (dry) 05/03/24 0835   Odor None 05/03/24 0835   Radha-wound Assessment Dry/flaky 05/03/24 0835   Margins Attached edges 05/03/24 0835   Number of days: 1            Response to treatment:  Well tolerated by patient.     Pain Assessment:  Severity:  0 / 10  Quality of pain: N/A  Wound Pain Timing/Severity: none  Premedicated: No    Plan   Plan of Care: Wound 05/02/24 Pretibial Right-Dressing/Treatment: Open to air  Wound 05/02/24 Pretibial Left-Dressing/Treatment: Open to air    Bid monitor left lower tibia anterior fluid filled blister.  If erupts cover with mepilex.    Wound Care to sign off    Reconsult if needed.    Specialty Bed Required : Yes   [] Low Air Loss   [x] Pressure Redistribution  [] Fluid Immersion  [] Bariatric  [] Total Pressure Relief  [] Other:     Current Diet: ADULT DIET; Regular; 3 carb choices (45 gm/meal); No Added Salt (3-4 gm)  Dietician consult:  Yes    Discharge Plan:  Placement for patient upon discharge: home with support    Patient appropriate for Outpatient Wound Care Center: No    Referrals:  [x]  / discharge planner  [] Home Health Care  [] Supplies  [] Other    Patient/Caregiver Teaching:  Level of patient/caregiver understanding able to:   [] Indicates understanding       [] Needs reinforcement  [] Unsuccessful      [x] Verbal Understanding  [] Demonstrated understanding       [] No evidence of learning  [] Refused teaching         [] N/A       Electronically signed by Malinda Aguilar RN, BSN on 5/3/2024 at 8:38 AM

## 2024-05-03 NOTE — PROGRESS NOTES
Hospital Medicine Progress Note      Date of Admission: 5/2/2024  Hospital Day: 2    Chief Admission Complaint:    Chief Complaint   Patient presents with    Shortness of Breath     10 lb weight gain past week, placed on 3L this past week, usually does not wear oxygen, increasing sob, resides at [facility], [place],      Subjective:    Pt states that he has been feeling pretty good for a while now, however over the past week, he started requiring oxygen and noted that he gained about 10 lbs over the week. States that he is starting to feel better, but is interested in getting a paracentesis as he says his abdomen feels very tight and swollen right now.     Presenting Admission History:       69 y.o. male who presented to [facility] with  above c/o ..  PMHx significant for CKD diastolic CHF CAD CABG diabetes hypertension and hyperlipidemia.  He is a long-term care resident and started with shortness of breath few days ago  Is worsened and currently he needs oxygen  He was sent to the emergency room for further evaluation  He is on 3 L oxygen and comfortable now  He denies chest pain cough sputum fever change in mental status nausea vomiting diarrhea or any urinary complaints.  He feels his stomach is more and more distended   He has chronic venous stasis with edema and erythematous changes over lower extremities  He is a Jehovah witness     Assessment/Plan:       Current Principal Problem:  CHF NYHA class I, acute, diastolic (HCC)     Acute on chronic diastolic congestive heart failure, appears decompensated  - TTE on 08/26/22 with LV systolic function low normal with EF 50-55%. Grade 3 DD. Right ventricular systolic function is reduced. Aortic valve appears functionally bicuspid with severely calcified and fused left and non-coronary cusps. Moderate aortic stenosis and regurgitation.  - Repeat Echo pending  - Continue home carvedilol, spironolactone  - IV lasix gtt per nephro  - Daily weights, strict I's and O's  -  oxyCODONE-acetaminophen, sodium chloride flush, sodium chloride, ondansetron **OR** [DISCONTINUED] ondansetron, acetaminophen **OR** acetaminophen, perflutren lipid microspheres, glucose, dextrose bolus **OR** dextrose bolus, glucagon (rDNA), dextrose     Labs:  Personally reviewed and interpreted for clinical significance.     Recent Labs     05/02/24  1342 05/03/24  0431   WBC 8.1 6.2   HGB 13.9 13.6   HCT 43.1 42.4   * 122*     Recent Labs     05/02/24  1342 05/03/24  0431   * 136   K 4.3 3.7   CL 90* 92*   CO2 30 32   * 105*   CREATININE 1.7* 1.5*   CALCIUM 9.0 9.0   MG  --  2.80*     Recent Labs     05/02/24  1342 05/02/24  1522   PROBNP 3,596*  --    TROPHS 92* 89*     No results for input(s): \"LABA1C\" in the last 72 hours.  Recent Labs     05/02/24  1342   AST 16   ALT 10   BILITOT 0.9   ALKPHOS 109     Recent Labs     05/02/24  1342 05/02/24  1522   LACTA 0.9  --    TSH  --  0.76       Urine Cultures:   Lab Results   Component Value Date/Time    LABURIN  05/22/2018 02:30 PM     <10,000 CFU/ml mixed skin/urogenital deysi. No further workup     Blood Cultures:   Lab Results   Component Value Date/Time    BC No Growth after 4 days of incubation. 03/07/2022 04:30 PM     Lab Results   Component Value Date/Time    BLOODCULT2 No Growth after 4 days of incubation. 03/07/2022 04:34 PM     Organism:   Lab Results   Component Value Date/Time    ORG Pseudomonas aeruginosa 09/18/2020 03:23 PM    ORG Enterococcus faecalis 09/18/2020 03:23 PM         PATRICK Bourne

## 2024-05-03 NOTE — CONSULTS
Patient seen and examined, consult note dictated.     Assessment and Plan:     1- CKD: The patient has chronic kidney disease with a baseline creatinine of 1.5 to 1.7 mg/dl. His urinary output is well maintained and his serum creatinine is currently at baseline.  - Consider IV Lasix drip.  - Avoid all nephrotoxic agents at this time.  - Maintain systolic blood pressure > 120 mmHg.    2- Hyponatremia: Secondary to hypervolemia and slowly improving with diuresis, monitor.     3- HTN: Blood pressure relatively low, we will apply parameters.

## 2024-05-04 LAB
ANION GAP SERPL CALCULATED.3IONS-SCNC: 13 MMOL/L (ref 3–16)
BUN SERPL-MCNC: 101 MG/DL (ref 7–20)
CALCIUM SERPL-MCNC: 9.4 MG/DL (ref 8.3–10.6)
CHLORIDE SERPL-SCNC: 90 MMOL/L (ref 99–110)
CO2 SERPL-SCNC: 31 MMOL/L (ref 21–32)
CREAT SERPL-MCNC: 1.6 MG/DL (ref 0.8–1.3)
GFR SERPLBLD CREATININE-BSD FMLA CKD-EPI: 46 ML/MIN/{1.73_M2}
GLUCOSE BLD-MCNC: 223 MG/DL (ref 70–99)
GLUCOSE BLD-MCNC: 245 MG/DL (ref 70–99)
GLUCOSE BLD-MCNC: 246 MG/DL (ref 70–99)
GLUCOSE BLD-MCNC: 249 MG/DL (ref 70–99)
GLUCOSE SERPL-MCNC: 226 MG/DL (ref 70–99)
MAGNESIUM SERPL-MCNC: 2.6 MG/DL (ref 1.8–2.4)
PERFORMED ON: ABNORMAL
POTASSIUM SERPL-SCNC: 4.1 MMOL/L (ref 3.5–5.1)
SODIUM SERPL-SCNC: 134 MMOL/L (ref 136–145)

## 2024-05-04 PROCEDURE — 99232 SBSQ HOSP IP/OBS MODERATE 35: CPT | Performed by: NURSE PRACTITIONER

## 2024-05-04 PROCEDURE — 6360000002 HC RX W HCPCS: Performed by: INTERNAL MEDICINE

## 2024-05-04 PROCEDURE — 6370000000 HC RX 637 (ALT 250 FOR IP): Performed by: INTERNAL MEDICINE

## 2024-05-04 PROCEDURE — 1200000000 HC SEMI PRIVATE

## 2024-05-04 PROCEDURE — 80048 BASIC METABOLIC PNL TOTAL CA: CPT

## 2024-05-04 PROCEDURE — 36415 COLL VENOUS BLD VENIPUNCTURE: CPT

## 2024-05-04 PROCEDURE — 2580000003 HC RX 258: Performed by: INTERNAL MEDICINE

## 2024-05-04 PROCEDURE — 2700000000 HC OXYGEN THERAPY PER DAY

## 2024-05-04 PROCEDURE — 94761 N-INVAS EAR/PLS OXIMETRY MLT: CPT

## 2024-05-04 PROCEDURE — 6360000002 HC RX W HCPCS: Performed by: NURSE PRACTITIONER

## 2024-05-04 PROCEDURE — 83735 ASSAY OF MAGNESIUM: CPT

## 2024-05-04 PROCEDURE — 6370000000 HC RX 637 (ALT 250 FOR IP): Performed by: NURSE PRACTITIONER

## 2024-05-04 RX ORDER — DIPHENHYDRAMINE HYDROCHLORIDE 50 MG/ML
25 INJECTION INTRAMUSCULAR; INTRAVENOUS EVERY 6 HOURS PRN
Status: DISCONTINUED | OUTPATIENT
Start: 2024-05-04 | End: 2024-05-10 | Stop reason: HOSPADM

## 2024-05-04 RX ORDER — MIDODRINE HYDROCHLORIDE 5 MG/1
5 TABLET ORAL 2 TIMES DAILY WITH MEALS
Status: DISCONTINUED | OUTPATIENT
Start: 2024-05-04 | End: 2024-05-10 | Stop reason: HOSPADM

## 2024-05-04 RX ADMIN — OXYCODONE AND ACETAMINOPHEN 1 TABLET: 5; 325 TABLET ORAL at 06:40

## 2024-05-04 RX ADMIN — TIMOLOL MALEATE 1 DROP: 5 SOLUTION OPHTHALMIC at 09:16

## 2024-05-04 RX ADMIN — DORZOLAMIDE HYDROCHLORIDE 1 DROP: 20 SOLUTION/ DROPS OPHTHALMIC at 09:16

## 2024-05-04 RX ADMIN — Medication 10 ML: at 09:20

## 2024-05-04 RX ADMIN — MIDODRINE HYDROCHLORIDE 5 MG: 5 TABLET ORAL at 14:29

## 2024-05-04 RX ADMIN — PREGABALIN 50 MG: 25 CAPSULE ORAL at 20:51

## 2024-05-04 RX ADMIN — DOCUSATE SODIUM 100 MG: 100 CAPSULE, LIQUID FILLED ORAL at 09:18

## 2024-05-04 RX ADMIN — OXYCODONE AND ACETAMINOPHEN 1 TABLET: 5; 325 TABLET ORAL at 20:51

## 2024-05-04 RX ADMIN — DOCUSATE SODIUM 100 MG: 100 CAPSULE, LIQUID FILLED ORAL at 20:51

## 2024-05-04 RX ADMIN — BRIMONIDINE TARTRATE 1 DROP: 2 SOLUTION OPHTHALMIC at 20:56

## 2024-05-04 RX ADMIN — FUROSEMIDE 5 MG/HR: 10 INJECTION, SOLUTION INTRAMUSCULAR; INTRAVENOUS at 22:03

## 2024-05-04 RX ADMIN — PREGABALIN 50 MG: 25 CAPSULE ORAL at 09:17

## 2024-05-04 RX ADMIN — CARVEDILOL 3.12 MG: 3.12 TABLET, FILM COATED ORAL at 09:18

## 2024-05-04 RX ADMIN — BRIMONIDINE TARTRATE 1 DROP: 2 SOLUTION OPHTHALMIC at 09:16

## 2024-05-04 RX ADMIN — PANTOPRAZOLE SODIUM 40 MG: 40 TABLET, DELAYED RELEASE ORAL at 09:20

## 2024-05-04 RX ADMIN — CARVEDILOL 3.12 MG: 3.12 TABLET, FILM COATED ORAL at 17:52

## 2024-05-04 RX ADMIN — INSULIN LISPRO 1 UNITS: 100 INJECTION, SOLUTION INTRAVENOUS; SUBCUTANEOUS at 09:17

## 2024-05-04 RX ADMIN — ASPIRIN 81 MG: 81 TABLET, COATED ORAL at 09:18

## 2024-05-04 RX ADMIN — ATORVASTATIN CALCIUM 80 MG: 80 TABLET, FILM COATED ORAL at 20:50

## 2024-05-04 RX ADMIN — Medication 10 ML: at 20:56

## 2024-05-04 RX ADMIN — TIMOLOL MALEATE 1 DROP: 5 SOLUTION OPHTHALMIC at 20:56

## 2024-05-04 RX ADMIN — ALLOPURINOL 300 MG: 300 TABLET ORAL at 09:18

## 2024-05-04 RX ADMIN — INSULIN LISPRO 1 UNITS: 100 INJECTION, SOLUTION INTRAVENOUS; SUBCUTANEOUS at 13:04

## 2024-05-04 RX ADMIN — DIPHENHYDRAMINE HYDROCHLORIDE 25 MG: 50 INJECTION INTRAMUSCULAR; INTRAVENOUS at 20:51

## 2024-05-04 RX ADMIN — HEPARIN SODIUM 5000 UNITS: 5000 INJECTION INTRAVENOUS; SUBCUTANEOUS at 05:42

## 2024-05-04 RX ADMIN — INSULIN LISPRO 1 UNITS: 100 INJECTION, SOLUTION INTRAVENOUS; SUBCUTANEOUS at 18:28

## 2024-05-04 RX ADMIN — TAMSULOSIN HYDROCHLORIDE 0.8 MG: 0.4 CAPSULE ORAL at 06:34

## 2024-05-04 RX ADMIN — DORZOLAMIDE HYDROCHLORIDE 1 DROP: 20 SOLUTION/ DROPS OPHTHALMIC at 20:56

## 2024-05-04 RX ADMIN — MIDODRINE HYDROCHLORIDE 5 MG: 5 TABLET ORAL at 17:52

## 2024-05-04 RX ADMIN — OXYCODONE AND ACETAMINOPHEN 1 TABLET: 5; 325 TABLET ORAL at 15:48

## 2024-05-04 RX ADMIN — INSULIN GLARGINE 40 UNITS: 100 INJECTION, SOLUTION SUBCUTANEOUS at 20:50

## 2024-05-04 RX ADMIN — OXYCODONE AND ACETAMINOPHEN 1 TABLET: 5; 325 TABLET ORAL at 01:21

## 2024-05-04 ASSESSMENT — PAIN SCALES - GENERAL
PAINLEVEL_OUTOF10: 0
PAINLEVEL_OUTOF10: 7
PAINLEVEL_OUTOF10: 8
PAINLEVEL_OUTOF10: 7
PAINLEVEL_OUTOF10: 8
PAINLEVEL_OUTOF10: 7
PAINLEVEL_OUTOF10: 0
PAINLEVEL_OUTOF10: 0

## 2024-05-04 ASSESSMENT — PAIN DESCRIPTION - LOCATION
LOCATION: BACK;FOOT
LOCATION: BACK
LOCATION: BACK
LOCATION: BACK;FOOT
LOCATION: BACK

## 2024-05-04 ASSESSMENT — PAIN DESCRIPTION - DESCRIPTORS
DESCRIPTORS: ACHING

## 2024-05-04 ASSESSMENT — PAIN DESCRIPTION - ORIENTATION
ORIENTATION: RIGHT;LEFT
ORIENTATION: MID;RIGHT;LEFT
ORIENTATION: MID

## 2024-05-04 ASSESSMENT — PAIN - FUNCTIONAL ASSESSMENT
PAIN_FUNCTIONAL_ASSESSMENT: ACTIVITIES ARE NOT PREVENTED

## 2024-05-04 NOTE — PLAN OF CARE
Problem: Discharge Planning  Goal: Discharge to home or other facility with appropriate resources  5/3/2024 2241 by Chichi Childress RN  Outcome: Progressing  5/3/2024 2240 by Chichi Childress RN  Outcome: Progressing  Flowsheets (Taken 5/3/2024 2232)  Discharge to home or other facility with appropriate resources: Identify barriers to discharge with patient and caregiver     Problem: Pain  Goal: Verbalizes/displays adequate comfort level or baseline comfort level  5/3/2024 2241 by Chichi Childress RN  Outcome: Progressing  5/3/2024 2240 by Chichi Childress RN  Outcome: Progressing  Flowsheets (Taken 5/3/2024 2000)  Verbalizes/displays adequate comfort level or baseline comfort level: Assess pain using appropriate pain scale     Problem: Skin/Tissue Integrity  Goal: Absence of new skin breakdown  Description: 1.  Monitor for areas of redness and/or skin breakdown  2.  Assess vascular access sites hourly  3.  Every 4-6 hours minimum:  Change oxygen saturation probe site  4.  Every 4-6 hours:  If on nasal continuous positive airway pressure, respiratory therapy assess nares and determine need for appliance change or resting period.  5/3/2024 2241 by Chichi Childress RN  Outcome: Progressing  5/3/2024 2240 by Chichi Childress RN  Outcome: Progressing  Note: Assist pt. With q2t and prn.     Problem: Safety - Adult  Goal: Free from fall injury  5/3/2024 2241 by Chichi Childress RN  Outcome: Progressing  5/3/2024 2240 by Chichi Childress RN  Outcome: Progressing  Flowsheets (Taken 5/3/2024 2240)  Free From Fall Injury: (no caregiver present) --     Problem: Chronic Conditions and Co-morbidities  Goal: Patient's chronic conditions and co-morbidity symptoms are monitored and maintained or improved  5/3/2024 2241 by Chichi Childress RN  Outcome: Progressing  5/3/2024 2240 by Chichi Childress RN  Outcome: Progressing  Flowsheets (Taken 5/3/2024 2232)  Care Plan - Patient's Chronic Conditions and Co-Morbidity Symptoms are Monitored and Maintained or

## 2024-05-04 NOTE — PROGRESS NOTES
Department of Internal Medicine  Nephrology Progress Note        SUBJECTIVE:    We are following this patient for CKD management.  The patient was seen and examined; he feels well today with no CP, SOB, nausea or vomiting.    ROS: No fever or chills.  Social: No family at bedside.    Physical Exam:    VITALS:  BP (!) 89/48   Pulse 88   Temp 97.5 °F (36.4 °C) (Axillary)   Resp 18   Ht 1.727 m (5' 8\")   Wt 118.5 kg (261 lb 3.2 oz)   SpO2 98%   BMI 39.72 kg/m²     General appearance: Seems comfortable, no acute distress.  Neck: Trachea midline, thyroid normal.   Lungs:  Non labored breathing, CTA to anterior auscultation.  Heart:  S1S2 normal, rub or gallop. + peripheral edema.  Abdomen: Soft, non-tender, no organomegaly.   Skin: No lesions or rashes, warm to touch.     DATA:    CBC with Differential:    Lab Results   Component Value Date/Time    WBC 6.2 05/03/2024 04:31 AM    RBC 4.92 05/03/2024 04:31 AM    HGB 13.6 05/03/2024 04:31 AM    HCT 42.4 05/03/2024 04:31 AM     05/03/2024 04:31 AM    MCV 86.2 05/03/2024 04:31 AM    MCH 27.7 05/03/2024 04:31 AM    MCHC 32.1 05/03/2024 04:31 AM    RDW 19.9 05/03/2024 04:31 AM    NRBC CANCELED 10/05/2016 12:04 PM    BANDSPCT 12.0 03/02/2012 06:30 AM    LYMPHOPCT 9.5 05/03/2024 04:31 AM    MONOPCT 9.2 05/03/2024 04:31 AM    EOSPCT 4.8 05/03/2024 04:31 AM    BASOPCT 0.6 05/03/2024 04:31 AM    MONOSABS 0.6 05/03/2024 04:31 AM    EOSABS 0.3 05/03/2024 04:31 AM    BASOSABS 0.0 05/03/2024 04:31 AM    DIFFTYPE Auto-K 02/04/2013 10:11 AM     BMP:    Lab Results   Component Value Date/Time     05/04/2024 05:07 AM    K 4.1 05/04/2024 05:07 AM    K 4.3 05/02/2024 01:42 PM    CL 90 05/04/2024 05:07 AM    CO2 31 05/04/2024 05:07 AM     05/04/2024 05:07 AM    CREATININE 1.6 05/04/2024 05:07 AM    CALCIUM 9.4 05/04/2024 05:07 AM    GFRAA 43 09/01/2022 08:20 AM    GFRAA 53 06/06/2013 02:22 PM    LABGLOM 46 05/04/2024 05:07 AM    LABGLOM 33 08/31/2023 12:00 AM

## 2024-05-04 NOTE — PROGRESS NOTES
Hospital Medicine Progress Note      Date of Admission: 5/2/2024  Hospital Day: 3    Chief Admission Complaint:    Chief Complaint   Patient presents with    Shortness of Breath     10 lb weight gain past week, placed on 3L this past week, usually does not wear oxygen, increasing sob, resides at [facility],      Subjective:    Wt 118.5kg today. Net negative 4.1L    Pt reports feeling better overall, but concerned that he hasn't lost much weight yet.     Presenting Admission History:       69 y.o. male who presented to [facility] with  above c/o ..  PMHx significant for CKD diastolic CHF CAD CABG diabetes hypertension and hyperlipidemia.  He is a long-term care resident and started with shortness of breath few days ago  Is worsened and currently he needs oxygen  He was sent to the emergency room for further evaluation  He is on 3 L oxygen and comfortable now  He denies chest pain cough sputum fever change in mental status nausea vomiting diarrhea or any urinary complaints.  He feels his stomach is more and more distended   He has chronic venous stasis with edema and erythematous changes over lower extremities  He is a Jehovah witness     Assessment/Plan:       Current Principal Problem:  CHF NYHA class I, acute, diastolic (HCC)     Acute on chronic diastolic congestive heart failure, appears decompensated  - TTE on 08/26/22 with LV systolic function low normal with EF 50-55%. Grade 3 DD. Right ventricular systolic function is reduced. Aortic valve appears functionally bicuspid with severely calcified and fused left and non-coronary cusps. Moderate aortic stenosis and regurgitation.  - Repeat Echo on 05/03/24 with LVEF 50-54% with indeterminate relaxation. Septal flattening is noted consistent with RV volume/pressure overload. RV poorly visualized, but appears moderately dilated with reduced systolic function. Severely enlarged right atrium. Calcified aortic valve with moderate stenosis and mild  regurgitation. Mild to moderate tricuspid valve regurgitation. Severe pulmonary hypertension.   - Continue home carvedilol, spironolactone  - IV lasix gtt per nephro  - Daily weights, strict I's and O's  - Cardiology consulted, appreciate their input    Hypoxia in setting of above  - CXR with mild cardiomegaly and mild central pulmonary congestion which is more prominent. Hazy perihilar bibasilar interstitial ground glass opacity which is slightly more prominent may be related to pulmonary edema and/or early infiltrates from pneumonia  - IV lasix gtt  - Will hold off on antibiotics for now as suspect more likely CHF exacerbation causing hypoxia  - Pulmonary toileting  - Oxygen therapy protocol    Hx of CAD s/p CABG  - Troponins mildly elevated on admission, likely 2/2 above  - No chest pain  - Cardiology following    CKD3, stable  - Baseline Cr ~ 1.7-2.0  - Currently on IV lasix gtt  - Nephrology consulted, appreciate their input    DM2, uncontrolled.  - Hemoglobin a1c 7.4 on 12/16/2019  - LDSSI + prandial  - POCT ac/hs  - Hypoglycemia protocol  - Carb control diet    Essential HTN, controlled.  - Continue home carvedilol  - Telemetry monitoring    Hx of HLD, controlled with statin.  - Continue home statin  - Follow-up with PCP for med adjustments     Chronic venous stasis with chronic color change    Jehovah witness    Physical Exam Performed:      General appearance:  No apparent distress  Respiratory:  Normal respiratory effort.   Cardiovascular:  Regular rate and rhythm.  Abdomen:  Soft, non-tender, non-distended.  Musculoskelatal:  2+ edema bilateral lower extremities. Chronic color changed, few fluid filled blisters noted to L shin area  Neurologic:  Non-focal  Psychiatric:  Alert and oriented x4    /72   Pulse 88   Temp 97.6 °F (36.4 °C) (Oral)   Resp 18   Ht 1.727 m (5' 8\")   Wt 118.5 kg (261 lb 3.2 oz)   SpO2 90%   BMI 39.72 kg/m²     Diet: ADULT DIET; Regular; 3 carb choices (45 gm/meal); No  day    insulin lispro  0-4 Units SubCUTAneous TID WC    insulin lispro  0-4 Units SubCUTAneous Nightly    brimonidine  1 drop Left Eye BID    And    timolol  1 drop Left Eye BID     PRN Meds: oxyCODONE-acetaminophen, sodium chloride flush, sodium chloride, ondansetron **OR** [DISCONTINUED] ondansetron, acetaminophen **OR** acetaminophen, perflutren lipid microspheres, glucose, dextrose bolus **OR** dextrose bolus, glucagon (rDNA), dextrose     Labs:  Personally reviewed and interpreted for clinical significance.     Recent Labs     05/02/24  1342 05/03/24  0431   WBC 8.1 6.2   HGB 13.9 13.6   HCT 43.1 42.4   * 122*       Recent Labs     05/02/24  1342 05/03/24  0431 05/04/24  0507   * 136 134*   K 4.3 3.7 4.1   CL 90* 92* 90*   CO2 30 32 31   * 105* 101*   CREATININE 1.7* 1.5* 1.6*   CALCIUM 9.0 9.0 9.4   MG  --  2.80* 2.60*       Recent Labs     05/02/24  1342 05/02/24  1522   PROBNP 3,596*  --    TROPHS 92* 89*       Recent Labs     05/03/24  0431   LABA1C 9.5     Recent Labs     05/02/24  1342   AST 16   ALT 10   BILITOT 0.9   ALKPHOS 109       Recent Labs     05/02/24  1342 05/02/24  1522   LACTA 0.9  --    TSH  --  0.76         Urine Cultures:   Lab Results   Component Value Date/Time    LABURIN  05/22/2018 02:30 PM     <10,000 CFU/ml mixed skin/urogenital deysi. No further workup     Blood Cultures:   Lab Results   Component Value Date/Time    BC No Growth after 4 days of incubation. 03/07/2022 04:30 PM     Lab Results   Component Value Date/Time    BLOODCULT2 No Growth after 4 days of incubation. 03/07/2022 04:34 PM     Organism:   Lab Results   Component Value Date/Time    ORG Pseudomonas aeruginosa 09/18/2020 03:23 PM    ORG Enterococcus faecalis 09/18/2020 03:23 PM         PATRICK Bourne

## 2024-05-04 NOTE — PROGRESS NOTES
Perfect serve message sent to JOSIAS Deras, \"No telemetry order. Do you want to continue? Thanks.\", awaiting response.

## 2024-05-04 NOTE — PLAN OF CARE
Problem: Discharge Planning  Goal: Discharge to home or other facility with appropriate resources  Outcome: Progressing  Flowsheets (Taken 5/3/2024 2232)  Discharge to home or other facility with appropriate resources: Identify barriers to discharge with patient and caregiver     Problem: Pain  Goal: Verbalizes/displays adequate comfort level or baseline comfort level  Outcome: Progressing  Flowsheets (Taken 5/3/2024 2000)  Verbalizes/displays adequate comfort level or baseline comfort level: Assess pain using appropriate pain scale     Problem: Skin/Tissue Integrity  Goal: Absence of new skin breakdown  Description: 1.  Monitor for areas of redness and/or skin breakdown  2.  Assess vascular access sites hourly  3.  Every 4-6 hours minimum:  Change oxygen saturation probe site  4.  Every 4-6 hours:  If on nasal continuous positive airway pressure, respiratory therapy assess nares and determine need for appliance change or resting period.  Outcome: Progressing  Note: Assist pt. With q2t and prn.     Problem: Safety - Adult  Goal: Free from fall injury  Outcome: Progressing  Flowsheets (Taken 5/3/2024 2240)  Free From Fall Injury: (no caregiver present) --     Problem: Chronic Conditions and Co-morbidities  Goal: Patient's chronic conditions and co-morbidity symptoms are monitored and maintained or improved  Outcome: Progressing  Flowsheets (Taken 5/3/2024 2232)  Care Plan - Patient's Chronic Conditions and Co-Morbidity Symptoms are Monitored and Maintained or Improved: Monitor and assess patient's chronic conditions and comorbid symptoms for stability, deterioration, or improvement

## 2024-05-04 NOTE — PROGRESS NOTES
[facility]   Daily Progress Note      Admit Date:  5/2/2024    Reason for follow up visit: CHF    CC: \"I feel miserable.\"    70 y/o male with PMH notable for diastolic HF, HTN, HLD, CAD-S/P CABG, pulmonary HTN, CKD, H/O PAF (declines OAC), severe HENNA and subdural hematoma admitted with CHF.  Continue IV diuretic.     Interval History:  Pt. seen and examined; records reviewed  Cr 1.6 today; wt today 261# (dry weight 242#)  BP stable. Remains on O2 @ 1L  Net diuresis -4.1L since admit; -1.7 last 24 hours  Remains on Lasix gtt @ 5 mg (nephrology )  + SOB, + marked edema  Denies chest pain, palpitations or dizziness    Subjective:  Pt with no acute overnight cardiac events.     Objective:   /72   Pulse 88   Temp 97.6 °F (36.4 °C) (Oral)   Resp 18   Ht 1.727 m (5' 8\")   Wt 118.5 kg (261 lb 3.2 oz)   SpO2 90%   BMI 39.72 kg/m²     Intake/Output Summary (Last 24 hours) at 5/4/2024 0745  Last data filed at 5/4/2024 0451  Gross per 24 hour   Intake 541.13 ml   Output 2300 ml   Net -1758.87 ml     Wt Readings from Last 3 Encounters:   05/04/24 118.5 kg (261 lb 3.2 oz)   01/15/24 109.8 kg (242 lb)   10/12/23 108.3 kg (238 lb 12.8 oz)       Physical Exam:  General: In no acute distress. Alert and oriented x4. Resting in bed  HEENT; Sclerae anicteric. Normocephalic  Skin:  Warm and dry. .   Neck:  Supple. + JVD to jaw  Chest: Lungs with rales in posterior bases  Cardiovascular:  irreg, irreg; II/VI AS murmur  Abdomen: + markedly distended; nontender, + bowel sounds  Extremities:  2-3+ edema from hips down lower extremities; chronic erythematous (stasis) to lower legs bilaterally; blisters noted    Medications:    allopurinol  300 mg Oral Daily    aspirin EC  81 mg Oral Daily    atorvastatin  80 mg Oral Nightly    carvedilol  3.125 mg Oral BID WC    docusate sodium  100 mg Oral BID    dorzolamide  1 drop Left Eye BID    insulin glargine  40 Units SubCUTAneous Nightly    linaclotide  290 mcg Oral QAM AC     pantoprazole  40 mg Oral Daily    pregabalin  50 mg Oral BID    Netarsudil Dimesylate  1 drop Left Eye Nightly    tamsulosin  0.8 mg Oral Daily    sodium chloride flush  5-40 mL IntraVENous 2 times per day    heparin (porcine)  5,000 Units SubCUTAneous 3 times per day    insulin lispro  0-4 Units SubCUTAneous TID WC    insulin lispro  0-4 Units SubCUTAneous Nightly    brimonidine  1 drop Left Eye BID    And    timolol  1 drop Left Eye BID      furosemide (LASIX) 100 mg in sodium chloride 0.9 % 100 mL infusion 5 mg/hr (05/03/24 1006)    sodium chloride      dextrose       oxyCODONE-acetaminophen, sodium chloride flush, sodium chloride, ondansetron **OR** [DISCONTINUED] ondansetron, acetaminophen **OR** acetaminophen, perflutren lipid microspheres, glucose, dextrose bolus **OR** dextrose bolus, glucagon (rDNA), dextrose    Lab Data:  CBC:   Recent Labs     05/02/24  1342 05/03/24  0431   WBC 8.1 6.2   HGB 13.9 13.6   * 122*     BMP:    Recent Labs     05/02/24  1342 05/03/24  0431 05/04/24  0507   * 136 134*   K 4.3 3.7 4.1   CO2 30 32 31   * 105* 101*   CREATININE 1.7* 1.5* 1.6*     LIVR:   Recent Labs     05/02/24  1342   AST 16   ALT 10      Latest Reference Range & Units 08/11/23 20:11 08/14/23 05:06 05/02/24 13:42   Pro-BNP 0 - 124 pg/mL 2,373 (H) 2,292 (H) 3,596 (H)   (H): Data is abnormally high     Latest Reference Range & Units 05/03/24 04:31   Cholesterol, Total 0 - 199 mg/dL 77   HDL Cholesterol 40 - 60 mg/dL 35 (L)   LDL Cholesterol <100 mg/dL 27   Triglycerides 0 - 150 mg/dL 73   VLDL Not Established mg/dL 15   (L): Data is abnormally low    5/2/24 Abdominal US:  FINDINGS:  Trace ascites was seen in the right upper quadrant.  The amount of ascites  does not justify paracentesis.     IMPRESSION:  Trace ascites in the right upper quadrant.    Echo 5/3/24:  Summary   Technically difficult study.   Left ventricle is normal in size with low normal systolic function.   Poor visualization of  consistent with markedly elevated right atrial pressures (15     3/8/22 Lexiscan-Myoview:  Summary   Abnormal moderate risk myocardial perfusion study.   There is a large area of mixed ischemia and scar within the infero-apical,   apical-lateral, lateral, posterior-lateral, and posterior-basal segments.   The left ventricular size is moderately dilated.   The estimated left ventricular function is 67%.      Telemetry: Atrial fib with controlled VR  (Personally reviewed)    Assessment/Plan:    1) Acute on chronic biventricular heart failure  -acute on chronic HFpEF  -acute on chronic RV failure  -needs massive diuresis and on Lasix infusion  -continue low dose carvedilol  -on midodrine to help with hypotension with Lasix  -low sodium diet and fluid restriction  -trace ascites on US    2) Moderate aortic stenosis/aortic insufficiency  -echo shows mean gradient 23 mm Hg and mild AI    3) Coronary artery disease with prior CABG  -no angina  -continue medical management    4) Paroxysmal atrial fib  -VR controlled    5) Chronic kidney disease  -nephrology following  -baseline Cr 1.5-1.7  -continue IV lasix and monitor     Electronically signed by DIANE M ENZWEILER, APRN - CNP on 5/4/2024 at 7:45 AM

## 2024-05-04 NOTE — PROGRESS NOTES
Perfect serve message sent to PATRICK Deras-CNP, \"Pt BP 83/47. Midodrine given. Lasix gttp infusing.\", awaiting response.   She start it then see me to see if improving her symptoms

## 2024-05-04 NOTE — PLAN OF CARE
CHF Care Plan      Patient's EF (Ejection Fraction) is greater than 40%    Heart Failure Medications:  Diuretics:: Furosemide    (One of the following REQUIRED for EF </= 40%/SYSTOLIC FAILURE but MAY be used in EF% >40%/DIASTOLIC FAILURE)        ACE:: None        ARB:: None         ARNI:: None    (Beta Blockers)  NON- Evidenced Based Beta Blocker (for EF% >40%/DIASTOLIC FAILURE): None    Evidenced Based Beta Blocker::(REQUIRED for EF% <40%/SYSTOLIC FAILURE) Carvedilol- Coreg  ...................................................................................................................................................    Failed to redirect to the Timeline version of the [employer] SmartLink.      Patient's weights and intake/output reviewed    Daily Weight log at bedside, patient/family participation in use of log: \"yes    Patient's current weight today shows a difference of 1 lbs less than last documented weight.      Intake/Output Summary (Last 24 hours) at 5/4/2024 0706  Last data filed at 5/4/2024 0451  Gross per 24 hour   Intake 541.13 ml   Output 2300 ml   Net -1758.87 ml       Education Booklet Provided: yes    Comorbidities Reviewed Yes    Patient has a past medical history of Anemia, Angina, Arthritis, CAD (coronary artery disease), CHF (congestive heart failure) (HCC), CKD (chronic kidney disease) stage 3, GFR 30-59 ml/min (HCC), Clostridium difficile diarrhea, Diabetes mellitus (HCC), Diabetic neuropathy (HCC), Disease of blood and blood forming organ, Dizziness, GERD (gastroesophageal reflux disease), Headache, Hearing loss, Hyperlipidemia, Hypertension, Kidney stone, Refusal of blood transfusions as patient is Hinduism, Sleep apnea, Tinnitus, Venous ulcer (HCC), and Wound, open.     >>For CHF and Comorbidity documentation on Education Time and Topics, please see Education Tab      Pt resting in bed at this time on  1 L O2. Pt denies shortness of breath. Pt with pitting lower extremity edema.

## 2024-05-05 LAB
ANION GAP SERPL CALCULATED.3IONS-SCNC: 9 MMOL/L (ref 3–16)
BASOPHILS # BLD: 0 K/UL (ref 0–0.2)
BASOPHILS NFR BLD: 0.6 %
BUN SERPL-MCNC: 102 MG/DL (ref 7–20)
CALCIUM SERPL-MCNC: 9.3 MG/DL (ref 8.3–10.6)
CHLORIDE SERPL-SCNC: 91 MMOL/L (ref 99–110)
CO2 SERPL-SCNC: 35 MMOL/L (ref 21–32)
CREAT SERPL-MCNC: 1.6 MG/DL (ref 0.8–1.3)
DEPRECATED RDW RBC AUTO: 19.4 % (ref 12.4–15.4)
EOSINOPHIL # BLD: 0.2 K/UL (ref 0–0.6)
EOSINOPHIL NFR BLD: 3.9 %
GFR SERPLBLD CREATININE-BSD FMLA CKD-EPI: 46 ML/MIN/{1.73_M2}
GLUCOSE BLD-MCNC: 156 MG/DL (ref 70–99)
GLUCOSE BLD-MCNC: 190 MG/DL (ref 70–99)
GLUCOSE BLD-MCNC: 207 MG/DL (ref 70–99)
GLUCOSE BLD-MCNC: 254 MG/DL (ref 70–99)
GLUCOSE SERPL-MCNC: 253 MG/DL (ref 70–99)
HCT VFR BLD AUTO: 41 % (ref 40.5–52.5)
HGB BLD-MCNC: 13.3 G/DL (ref 13.5–17.5)
LYMPHOCYTES # BLD: 0.6 K/UL (ref 1–5.1)
LYMPHOCYTES NFR BLD: 10.6 %
MAGNESIUM SERPL-MCNC: 2.4 MG/DL (ref 1.8–2.4)
MCH RBC QN AUTO: 27.7 PG (ref 26–34)
MCHC RBC AUTO-ENTMCNC: 32.4 G/DL (ref 31–36)
MCV RBC AUTO: 85.4 FL (ref 80–100)
MONOCYTES # BLD: 0.7 K/UL (ref 0–1.3)
MONOCYTES NFR BLD: 11.6 %
NEUTROPHILS # BLD: 4.5 K/UL (ref 1.7–7.7)
NEUTROPHILS NFR BLD: 73.3 %
PERFORMED ON: ABNORMAL
PLATELET # BLD AUTO: 114 K/UL (ref 135–450)
PMV BLD AUTO: 9.2 FL (ref 5–10.5)
POTASSIUM SERPL-SCNC: 4 MMOL/L (ref 3.5–5.1)
RBC # BLD AUTO: 4.79 M/UL (ref 4.2–5.9)
SODIUM SERPL-SCNC: 135 MMOL/L (ref 136–145)
WBC # BLD AUTO: 6.1 K/UL (ref 4–11)

## 2024-05-05 PROCEDURE — 2700000000 HC OXYGEN THERAPY PER DAY

## 2024-05-05 PROCEDURE — 6370000000 HC RX 637 (ALT 250 FOR IP): Performed by: INTERNAL MEDICINE

## 2024-05-05 PROCEDURE — 36415 COLL VENOUS BLD VENIPUNCTURE: CPT

## 2024-05-05 PROCEDURE — 83735 ASSAY OF MAGNESIUM: CPT

## 2024-05-05 PROCEDURE — 94761 N-INVAS EAR/PLS OXIMETRY MLT: CPT

## 2024-05-05 PROCEDURE — 2580000003 HC RX 258: Performed by: NURSE PRACTITIONER

## 2024-05-05 PROCEDURE — 85025 COMPLETE CBC W/AUTO DIFF WBC: CPT

## 2024-05-05 PROCEDURE — 6370000000 HC RX 637 (ALT 250 FOR IP): Performed by: NURSE PRACTITIONER

## 2024-05-05 PROCEDURE — 1200000000 HC SEMI PRIVATE

## 2024-05-05 PROCEDURE — 80048 BASIC METABOLIC PNL TOTAL CA: CPT

## 2024-05-05 PROCEDURE — 6360000002 HC RX W HCPCS: Performed by: NURSE PRACTITIONER

## 2024-05-05 PROCEDURE — 2580000003 HC RX 258: Performed by: INTERNAL MEDICINE

## 2024-05-05 PROCEDURE — 99232 SBSQ HOSP IP/OBS MODERATE 35: CPT | Performed by: NURSE PRACTITIONER

## 2024-05-05 PROCEDURE — 6360000002 HC RX W HCPCS: Performed by: INTERNAL MEDICINE

## 2024-05-05 RX ORDER — POLYETHYLENE GLYCOL 3350 17 G/17G
17 POWDER, FOR SOLUTION ORAL DAILY
Status: DISCONTINUED | OUTPATIENT
Start: 2024-05-05 | End: 2024-05-05

## 2024-05-05 RX ORDER — POLYETHYLENE GLYCOL 3350 17 G/17G
17 POWDER, FOR SOLUTION ORAL 2 TIMES DAILY
Status: DISCONTINUED | OUTPATIENT
Start: 2024-05-05 | End: 2024-05-10 | Stop reason: HOSPADM

## 2024-05-05 RX ADMIN — DOCUSATE SODIUM 100 MG: 100 CAPSULE, LIQUID FILLED ORAL at 20:57

## 2024-05-05 RX ADMIN — MIDODRINE HYDROCHLORIDE 5 MG: 5 TABLET ORAL at 17:24

## 2024-05-05 RX ADMIN — MIDODRINE HYDROCHLORIDE 5 MG: 5 TABLET ORAL at 09:18

## 2024-05-05 RX ADMIN — INSULIN LISPRO 1 UNITS: 100 INJECTION, SOLUTION INTRAVENOUS; SUBCUTANEOUS at 12:35

## 2024-05-05 RX ADMIN — DORZOLAMIDE HYDROCHLORIDE 1 DROP: 20 SOLUTION/ DROPS OPHTHALMIC at 21:06

## 2024-05-05 RX ADMIN — POLYETHYLENE GLYCOL 3350 17 G: 17 POWDER, FOR SOLUTION ORAL at 22:36

## 2024-05-05 RX ADMIN — CARVEDILOL 3.12 MG: 3.12 TABLET, FILM COATED ORAL at 09:18

## 2024-05-05 RX ADMIN — PANTOPRAZOLE SODIUM 40 MG: 40 TABLET, DELAYED RELEASE ORAL at 09:18

## 2024-05-05 RX ADMIN — PREGABALIN 50 MG: 25 CAPSULE ORAL at 20:57

## 2024-05-05 RX ADMIN — TAMSULOSIN HYDROCHLORIDE 0.8 MG: 0.4 CAPSULE ORAL at 09:18

## 2024-05-05 RX ADMIN — TIMOLOL MALEATE 1 DROP: 5 SOLUTION OPHTHALMIC at 09:22

## 2024-05-05 RX ADMIN — OXYCODONE AND ACETAMINOPHEN 1 TABLET: 5; 325 TABLET ORAL at 01:50

## 2024-05-05 RX ADMIN — TIMOLOL MALEATE 1 DROP: 5 SOLUTION OPHTHALMIC at 21:06

## 2024-05-05 RX ADMIN — ASPIRIN 81 MG: 81 TABLET, COATED ORAL at 09:18

## 2024-05-05 RX ADMIN — BRIMONIDINE TARTRATE 1 DROP: 2 SOLUTION OPHTHALMIC at 21:06

## 2024-05-05 RX ADMIN — HEPARIN SODIUM 5000 UNITS: 5000 INJECTION INTRAVENOUS; SUBCUTANEOUS at 20:57

## 2024-05-05 RX ADMIN — Medication 10 ML: at 20:57

## 2024-05-05 RX ADMIN — ALLOPURINOL 300 MG: 300 TABLET ORAL at 09:18

## 2024-05-05 RX ADMIN — BRIMONIDINE TARTRATE 1 DROP: 2 SOLUTION OPHTHALMIC at 09:22

## 2024-05-05 RX ADMIN — OXYCODONE AND ACETAMINOPHEN 1 TABLET: 5; 325 TABLET ORAL at 06:30

## 2024-05-05 RX ADMIN — POLYETHYLENE GLYCOL 3350 17 G: 17 POWDER, FOR SOLUTION ORAL at 10:58

## 2024-05-05 RX ADMIN — OXYCODONE AND ACETAMINOPHEN 1 TABLET: 5; 325 TABLET ORAL at 17:24

## 2024-05-05 RX ADMIN — OXYCODONE AND ACETAMINOPHEN 1 TABLET: 5; 325 TABLET ORAL at 10:58

## 2024-05-05 RX ADMIN — DORZOLAMIDE HYDROCHLORIDE 1 DROP: 20 SOLUTION/ DROPS OPHTHALMIC at 09:22

## 2024-05-05 RX ADMIN — PREGABALIN 50 MG: 25 CAPSULE ORAL at 09:18

## 2024-05-05 RX ADMIN — OXYCODONE AND ACETAMINOPHEN 1 TABLET: 5; 325 TABLET ORAL at 22:48

## 2024-05-05 RX ADMIN — ATORVASTATIN CALCIUM 80 MG: 80 TABLET, FILM COATED ORAL at 20:57

## 2024-05-05 RX ADMIN — FUROSEMIDE 10 MG/HR: 10 INJECTION, SOLUTION INTRAMUSCULAR; INTRAVENOUS at 13:42

## 2024-05-05 RX ADMIN — INSULIN GLARGINE 40 UNITS: 100 INJECTION, SOLUTION SUBCUTANEOUS at 20:57

## 2024-05-05 RX ADMIN — DOCUSATE SODIUM 100 MG: 100 CAPSULE, LIQUID FILLED ORAL at 09:18

## 2024-05-05 ASSESSMENT — PAIN DESCRIPTION - LOCATION
LOCATION: BACK;LEG
LOCATION: BACK;NECK
LOCATION: BACK;NECK

## 2024-05-05 ASSESSMENT — PAIN SCALES - GENERAL
PAINLEVEL_OUTOF10: 1
PAINLEVEL_OUTOF10: 7
PAINLEVEL_OUTOF10: 8
PAINLEVEL_OUTOF10: 8

## 2024-05-05 ASSESSMENT — PAIN DESCRIPTION - DESCRIPTORS: DESCRIPTORS: ACHING

## 2024-05-05 ASSESSMENT — PAIN DESCRIPTION - ORIENTATION: ORIENTATION: RIGHT;LEFT;LOWER

## 2024-05-05 NOTE — PROGRESS NOTES
[facility]   Daily Progress Note      Admit Date:  5/2/2024    Reason for follow up visit: CHF    CC: \" I do not feel well.\"    70 y/o male with PMH notable for diastolic HF, HTN, HLD, CAD-S/P CABG, pulmonary HTN, CKD, H/O PAF (declines OAC), severe HENNA and subdural hematoma admitted with CHF. Continue IV diuretic.     Interval History:  Pt. seen and examined; records reviewed  BP stable overnight  Net diuresis -6.2 since admit; -2.1 L last 24 hours  Remains on Lasix @ 5 mg (nephrology monitoring)  Wt today 261#; remains on O2 @ 1L  + SOB, generalized edema  Denies chest pain, palpitations or dizziness    Subjective:  Pt with no acute overnight cardiac events.     Objective:   /65   Pulse 84   Temp 97.6 °F (36.4 °C) (Oral)   Resp 18   Ht 1.727 m (5' 8\")   Wt 118.8 kg (261 lb 12.8 oz)   SpO2 92%   BMI 39.81 kg/m²     Intake/Output Summary (Last 24 hours) at 5/5/2024 1137  Last data filed at 5/5/2024 0824  Gross per 24 hour   Intake 740 ml   Output 2900 ml   Net -2160 ml     Wt Readings from Last 3 Encounters:   05/05/24 118.8 kg (261 lb 12.8 oz)   01/15/24 109.8 kg (242 lb)   10/12/23 108.3 kg (238 lb 12.8 oz)       Physical Exam:  General: In no acute distress. Awake, alert, and oriented X4. Resting in bed  Skin:  Warm and dry.   Neck:  Supple.   Chest:  Lungs with decreased breath sounds bilaterally  Cardiovascular:  irreg, irreg; II/VI AS murmur  Abdomen:  + distended; nontender; + bowel sounds  Extremities:  2-3+ edema from hips down bilaterally; chronic stasis changes to lower legs.     Medications:    polyethylene glycol  17 g Oral Daily    midodrine  5 mg Oral BID WC    allopurinol  300 mg Oral Daily    aspirin EC  81 mg Oral Daily    atorvastatin  80 mg Oral Nightly    carvedilol  3.125 mg Oral BID WC    docusate sodium  100 mg Oral BID    dorzolamide  1 drop Left Eye BID    insulin glargine  40 Units SubCUTAneous Nightly    linaclotide  290 mcg Oral QAM AC    pantoprazole  40 mg Oral  Daily    pregabalin  50 mg Oral BID    Netarsudil Dimesylate  1 drop Left Eye Nightly    tamsulosin  0.8 mg Oral Daily    sodium chloride flush  5-40 mL IntraVENous 2 times per day    heparin (porcine)  5,000 Units SubCUTAneous 3 times per day    insulin lispro  0-4 Units SubCUTAneous TID WC    insulin lispro  0-4 Units SubCUTAneous Nightly    brimonidine  1 drop Left Eye BID    And    timolol  1 drop Left Eye BID      furosemide (LASIX) 100 mg in sodium chloride 0.9 % 100 mL infusion 10 mg/hr (05/05/24 1101)    sodium chloride      dextrose       diphenhydrAMINE, oxyCODONE-acetaminophen, sodium chloride flush, sodium chloride, ondansetron **OR** [DISCONTINUED] ondansetron, acetaminophen **OR** acetaminophen, perflutren lipid microspheres, glucose, dextrose bolus **OR** dextrose bolus, glucagon (rDNA), dextrose    Lab Data:  CBC:   Recent Labs     05/02/24  1342 05/03/24  0431 05/05/24  0452   WBC 8.1 6.2 6.1   HGB 13.9 13.6 13.3*   * 122* 114*     BMP:    Recent Labs     05/03/24  0431 05/04/24  0507 05/05/24  0452    134* 135*   K 3.7 4.1 4.0   CO2 32 31 35*   * 101* 102*   CREATININE 1.5* 1.6* 1.6*     LIVR:   Recent Labs     05/02/24  1342   AST 16   ALT 10     5/2/24 Abdominal US:  FINDINGS:  Trace ascites was seen in the right upper quadrant.  The amount of ascites  does not justify paracentesis.     IMPRESSION:  Trace ascites in the right upper quadrant.     Echo 5/3/24:  Summary   Technically difficult study.   Left ventricle is normal in size with low normal systolic function.   Poor visualization of endocardium, cannot exclude wall motion abnormalities.   LVEF is estimated at 50-54% with indeterminate relaxation.   Septal flattening is noted consistent with RV volume/pressure overload.   Right ventricle is poorly visualized but appears moderately dilatated with   reduced systolic function.   Right atrium is severely enlarged.   Calcified aortic valve with moderate stenosis and mild  function is 67%.    Telemetry: Atrial fib with controlled VR  (Personally reviewed)    Assessment/Plan:    1) Acute on chronic biventricular heart failure  -acute on chronic HFpEF  -acute on chronic RV failure  -continue diuresis on IV lasix (? Dialysis)  -continue low dose carvedilol  -on midodrine to help with hypotension with Lasix  -low sodium diet and fluid restriction  -trace ascites on US     2) Moderate aortic stenosis/aortic insufficiency  -echo shows mean gradient 23 mm Hg and mild AI     3) Coronary artery disease with prior CABG  -nothing to suggest angina  -continue medical management     4) Paroxysmal atrial fib  -VR controlled     5) Chronic kidney disease  -nephrology following  -baseline Cr 1.5-1.7  -continue IV lasix and monitor  -? Dialysis (defer to nephrology)    Electronically signed by DIANE M ENZWEILER, APRN - CNP on 5/5/2024 at 11:37 AM

## 2024-05-05 NOTE — PLAN OF CARE
Problem: Pain  Goal: Verbalizes/displays adequate comfort level or baseline comfort level  5/4/2024 2125 by Ashleigh Huntley RN  Outcome: Progressing   Verbalizes/displays adequate comfort level or baseline comfort level:   Encourage patient to monitor pain and request assistance   Assess pain using appropriate pain scale   Administer analgesics based on type and severity of pain and evaluate response   Implement non-pharmacological measures as appropriate and evaluate response  5/4/2024 2023 by Amanda Hewitt, RN  Outcome: Progressing  Flowsheets (Taken 5/4/2024 1230)  Verbalizes/displays adequate comfort level or baseline comfort level:   Encourage patient to monitor pain and request assistance   Assess pain using appropriate pain scale   Administer analgesics based on type and severity of pain and evaluate response   Implement non-pharmacological measures as appropriate and evaluate response     Problem: Skin/Tissue Integrity  Goal: Absence of new skin breakdown  Description: 1.  Monitor for areas of redness and/or skin breakdown  2.  Assess vascular access sites hourly  3.  Every 4-6 hours minimum:  Change oxygen saturation probe site  4.  Every 4-6 hours:  If on nasal continuous positive airway pressure, respiratory therapy assess nares and determine need for appliance change or resting period.  Outcome: Progressing     Problem: Safety - Adult  Goal: Free from fall injury  5/4/2024 2125 by Ashleigh Huntley, RN  Outcome: Progressing  Patient calls out appropriately for assistance    5/4/2024 2023 by Amanda Hewitt, RN  Outcome: Progressing  Flowsheets (Taken 5/4/2024 1230)  Free From Fall Injury: Instruct family/caregiver on patient safety     Problem: Neurosensory - Adult  Goal: Achieves stable or improved neurological status  Outcome: Progressing     Problem: Respiratory - Adult  Goal: Achieves optimal ventilation and oxygenation  5/4/2024 2125 by Ashleigh Huntley, RN  Outcome: Progressing  5/4/2024 2023 by  Amanda Hewitt, RN  Outcome: Progressing  Flowsheets (Taken 5/4/2024 1230)  Achieves optimal ventilation and oxygenation:   Assess for changes in respiratory status   Assess for changes in mentation and behavior   Position to facilitate oxygenation and minimize respiratory effort   Oxygen supplementation based on oxygen saturation or arterial blood gases     Problem: Skin/Tissue Integrity - Adult  Goal: Skin integrity remains intact  Outcome: Progressing

## 2024-05-05 NOTE — PROGRESS NOTES
Pt assessment completed and charted. VSS. Pt a/o. Pt c/o neuropathic pain bilat feet. Visitor @ bedside. Pt ambulating standby assist w/ walker. Malewick in place for accurate I/O's. Daily weight strict I/O's. Bed in lowest position and wheels locked. Call light within reach. Bedside table within reach. Non-skid socks in place. Pt denies any other needs at this time.  Pt calls out appropriately.

## 2024-05-05 NOTE — PROGRESS NOTES
Hospital Medicine Progress Note      Date of Admission: 5/2/2024  Hospital Day: 4    Chief Admission Complaint:    Chief Complaint   Patient presents with    Shortness of Breath     10 lb weight gain past week, placed on 3L this past week, usually does not wear oxygen, increasing sob, resides at [facility],      Subjective:    Wt 118.5kg today. Net negative 4.1L    Pt reports feeling better overall, but concerned that he hasn't lost much weight yet.     Discussed case with nephrology today - they recommended increasing lasix infusion to 10mg/hr, ordered. Appreciate their input.      Presenting Admission History:       69 y.o. male who presented to [facility] with  above c/o ..  PMHx significant for CKD diastolic CHF CAD CABG diabetes hypertension and hyperlipidemia.  He is a long-term care resident and started with shortness of breath few days ago  Is worsened and currently he needs oxygen  He was sent to the emergency room for further evaluation  He is on 3 L oxygen and comfortable now  He denies chest pain cough sputum fever change in mental status nausea vomiting diarrhea or any urinary complaints.  He feels his stomach is more and more distended   He has chronic venous stasis with edema and erythematous changes over lower extremities  He is a Jehovah witness     Assessment/Plan:       Current Principal Problem:  CHF NYHA class I, acute, diastolic (HCC)     Acute on chronic diastolic congestive heart failure, appears decompensated  - TTE on 08/26/22 with LV systolic function low normal with EF 50-55%. Grade 3 DD. Right ventricular systolic function is reduced. Aortic valve appears functionally bicuspid with severely calcified and fused left and non-coronary cusps. Moderate aortic stenosis and regurgitation.  - Repeat Echo on 05/03/24 with LVEF 50-54% with indeterminate relaxation. Septal flattening is noted consistent with RV volume/pressure overload. RV poorly visualized, but appears moderately dilated  Netarsudil Dimesylate  1 drop Left Eye Nightly    tamsulosin  0.8 mg Oral Daily    sodium chloride flush  5-40 mL IntraVENous 2 times per day    heparin (porcine)  5,000 Units SubCUTAneous 3 times per day    insulin lispro  0-4 Units SubCUTAneous TID WC    insulin lispro  0-4 Units SubCUTAneous Nightly    brimonidine  1 drop Left Eye BID    And    timolol  1 drop Left Eye BID     PRN Meds: diphenhydrAMINE, oxyCODONE-acetaminophen, sodium chloride flush, sodium chloride, ondansetron **OR** [DISCONTINUED] ondansetron, acetaminophen **OR** acetaminophen, perflutren lipid microspheres, glucose, dextrose bolus **OR** dextrose bolus, glucagon (rDNA), dextrose     Labs:  Personally reviewed and interpreted for clinical significance.     Recent Labs     05/02/24  1342 05/03/24  0431 05/05/24  0452   WBC 8.1 6.2 6.1   HGB 13.9 13.6 13.3*   HCT 43.1 42.4 41.0   * 122* 114*       Recent Labs     05/03/24  0431 05/04/24  0507 05/05/24  0452    134* 135*   K 3.7 4.1 4.0   CL 92* 90* 91*   CO2 32 31 35*   * 101* 102*   CREATININE 1.5* 1.6* 1.6*   CALCIUM 9.0 9.4 9.3   MG 2.80* 2.60* 2.40       Recent Labs     05/02/24  1342 05/02/24  1522   PROBNP 3,596*  --    TROPHS 92* 89*       Recent Labs     05/03/24  0431   LABA1C 9.5       Recent Labs     05/02/24  1342   AST 16   ALT 10   BILITOT 0.9   ALKPHOS 109       Recent Labs     05/02/24  1342 05/02/24  1522   LACTA 0.9  --    TSH  --  0.76         Urine Cultures:   Lab Results   Component Value Date/Time    LABURIN  05/22/2018 02:30 PM     <10,000 CFU/ml mixed skin/urogenital deysi. No further workup     Blood Cultures:   Lab Results   Component Value Date/Time    BC No Growth after 4 days of incubation. 03/07/2022 04:30 PM     Lab Results   Component Value Date/Time    BLOODCULT2 No Growth after 4 days of incubation. 03/07/2022 04:34 PM     Organism:   Lab Results   Component Value Date/Time    ORG Pseudomonas aeruginosa 09/18/2020 03:23 PM    ORG

## 2024-05-05 NOTE — PROGRESS NOTES
Department of Internal Medicine  Nephrology Progress Note        SUBJECTIVE:    We are following this patient for CKD management.  The patient was seen and examined; he feels well today with no CP, SOB, nausea or vomiting.    ROS: No fever or chills.  Social: No family at bedside.    Physical Exam:    VITALS:  /67   Pulse 86   Temp 98.6 °F (37 °C) (Oral)   Resp 18   Ht 1.727 m (5' 8\")   Wt 118.8 kg (261 lb 12.8 oz)   SpO2 91%   BMI 39.81 kg/m²     General appearance: Seems comfortable, no acute distress.  Neck: Trachea midline, thyroid normal.   Lungs:  Non labored breathing, CTA to anterior auscultation.  Heart:  S1S2 normal, rub or gallop. + peripheral edema.  Abdomen: Soft, non-tender, no organomegaly.   Skin: No lesions or rashes, warm to touch.     DATA:    CBC with Differential:    Lab Results   Component Value Date/Time    WBC 6.1 05/05/2024 04:52 AM    RBC 4.79 05/05/2024 04:52 AM    HGB 13.3 05/05/2024 04:52 AM    HCT 41.0 05/05/2024 04:52 AM     05/05/2024 04:52 AM    MCV 85.4 05/05/2024 04:52 AM    MCH 27.7 05/05/2024 04:52 AM    MCHC 32.4 05/05/2024 04:52 AM    RDW 19.4 05/05/2024 04:52 AM    NRBC CANCELED 10/05/2016 12:04 PM    BANDSPCT 12.0 03/02/2012 06:30 AM    LYMPHOPCT 10.6 05/05/2024 04:52 AM    MONOPCT 11.6 05/05/2024 04:52 AM    EOSPCT 3.9 05/05/2024 04:52 AM    BASOPCT 0.6 05/05/2024 04:52 AM    MONOSABS 0.7 05/05/2024 04:52 AM    EOSABS 0.2 05/05/2024 04:52 AM    BASOSABS 0.0 05/05/2024 04:52 AM    DIFFTYPE Auto-K 02/04/2013 10:11 AM     BMP:    Lab Results   Component Value Date/Time     05/05/2024 04:52 AM    K 4.0 05/05/2024 04:52 AM    K 4.3 05/02/2024 01:42 PM    CL 91 05/05/2024 04:52 AM    CO2 35 05/05/2024 04:52 AM     05/05/2024 04:52 AM    CREATININE 1.6 05/05/2024 04:52 AM    CALCIUM 9.3 05/05/2024 04:52 AM    GFRAA 43 09/01/2022 08:20 AM    GFRAA 53 06/06/2013 02:22 PM    LABGLOM 46 05/05/2024 04:52 AM    LABGLOM 33 08/31/2023 12:00 AM    GLUCOSE

## 2024-05-05 NOTE — CARE COORDINATION
Case Management Assessment  Initial Evaluation    Date/Time of Evaluation: 5/5/2024 8:59 AM  Assessment Completed by: Mai Ramirez RN    If patient is discharged prior to next notation, then this note serves as note for discharge by case management.    Patient Name: Shay Townsend                   YOB: 1955  Diagnosis: CHF NYHA class I, acute, diastolic (HCC) [I50.31]  Acute worsening of stage 3 chronic kidney disease (HCC) [N18.30]  Dysphagia, unspecified type [R13.10]  Acute on chronic congestive heart failure, unspecified heart failure type (HCC) [I50.9]                   Date / Time: 5/2/2024  1:24 PM    Patient Admission Status: Inpatient   Readmission Risk (Low < 19, Mod (19-27), High > 27): Readmission Risk Score: 20.9    Current PCP: Julia Stein MD  PCP verified by CM? Yes    Chart Reviewed: Yes      History Provided by: Patient  Patient Orientation: Alert and Oriented    Patient Cognition: Alert    Hospitalization in the last 30 days (Readmission):  No    If yes, Readmission Assessment in CM Navigator will be completed.    Advance Directives:      Code Status: Limited   Patient's Primary Decision Maker is: Named in Scanned ACP Document    Primary Decision Maker: Sloan Dey - Other - [phone]    Discharge Planning:    Patient lives with: Other (Comment) ([facility]) Type of Home: Long-Term Acute Care  Primary Care Giver: Self (lives at [facility])  Patient Support Systems include: [employer], Friends/Neighbors   Current Financial resources:    Current community resources:    Current services prior to admission: Long Term Acute Care            Current DME:              Type of Home Care services:  Skilled Therapy (gets therapy at [facility]placed by [facility] when insurance allows)    ADLS  Prior functional level: Independent in ADLs/IADLs  Current functional level: Independent in ADLs/IADLs    PT AM-PAC:   /24  OT AM-PAC:   /24    Family can provide assistance at DC:

## 2024-05-05 NOTE — PLAN OF CARE
CHF Care Plan      Patient's EF (Ejection Fraction) is greater than 40%    Heart Failure Medications:  Diuretics:: Furosemide    (One of the following REQUIRED for EF </= 40%/SYSTOLIC FAILURE but MAY be used in EF% >40%/DIASTOLIC FAILURE)        ACE:: None        ARB:: None         ARNI:: None    (Beta Blockers)  NON- Evidenced Based Beta Blocker (for EF% >40%/DIASTOLIC FAILURE): None    Evidenced Based Beta Blocker::(REQUIRED for EF% <40%/SYSTOLIC FAILURE) Carvedilol- Coreg  ...................................................................................................................................................    Failed to redirect to the Timeline version of the [employer] SmartLink.      Patient's weights and intake/output reviewed    Daily Weight log at bedside, patient/family participation in use of log: \"yes    Patient's current weight today shows a difference of 0 lbs less than last documented weight.      Intake/Output Summary (Last 24 hours) at 5/5/2024 0449  Last data filed at 5/5/2024 0228  Gross per 24 hour   Intake 740 ml   Output 3500 ml   Net -2760 ml         Education Booklet Provided: yes    Comorbidities Reviewed Yes    Patient has a past medical history of Anemia, Angina, Arthritis, CAD (coronary artery disease), CHF (congestive heart failure) ([facility]), CKD (chronic kidney disease) stage 3, GFR 30-59 ml/min (HCC), Clostridium difficile diarrhea, Diabetes mellitus (HCC), Diabetic neuropathy (HCC), Disease of blood and blood forming organ, Dizziness, GERD (gastroesophageal reflux disease), Headache, Hearing loss, Hyperlipidemia, Hypertension, Kidney stone, Refusal of blood transfusions as patient is [employer], Sleep apnea, Tinnitus, Venous ulcer (HCC), and Wound, open.     >>For CHF and Comorbidity documentation on Education Time and Topics, please see Education Tab      Pt sitting in bed at this time on  1 L O2. Pt denies shortness of breath. Pt with pitting lower extremity edema.

## 2024-05-05 NOTE — PLAN OF CARE
Problem: Pain  Goal: Verbalizes/displays adequate comfort level or baseline comfort level  Outcome: Progressing  Flowsheets (Taken 5/4/2024 1230)  Verbalizes/displays adequate comfort level or baseline comfort level:   Encourage patient to monitor pain and request assistance   Assess pain using appropriate pain scale   Administer analgesics based on type and severity of pain and evaluate response   Implement non-pharmacological measures as appropriate and evaluate response     Problem: Safety - Adult  Goal: Free from fall injury  Outcome: Progressing  Flowsheets (Taken 5/4/2024 1230)  Free From Fall Injury: Instruct family/caregiver on patient safety     Problem: Respiratory - Adult  Goal: Achieves optimal ventilation and oxygenation  Outcome: Progressing  Flowsheets (Taken 5/4/2024 1230)  Achieves optimal ventilation and oxygenation:   Assess for changes in respiratory status   Assess for changes in mentation and behavior   Position to facilitate oxygenation and minimize respiratory effort   Oxygen supplementation based on oxygen saturation or arterial blood gases

## 2024-05-05 NOTE — PLAN OF CARE
CHF Care Plan      Patient's EF (Ejection Fraction) is greater than 40%    Heart Failure Medications:  Diuretics:: Furosemide    (One of the following REQUIRED for EF </= 40%/SYSTOLIC FAILURE but MAY be used in EF% >40%/DIASTOLIC FAILURE)        ACE:: None        ARB:: None         ARNI:: None    (Beta Blockers)  NON- Evidenced Based Beta Blocker (for EF% >40%/DIASTOLIC FAILURE): None    Evidenced Based Beta Blocker::(REQUIRED for EF% <40%/SYSTOLIC FAILURE) Carvedilol- Coreg  ...................................................................................................................................................    Failed to redirect to the Timeline version of the [employer] SmartLink.      Patient's weights and intake/output reviewed    Daily Weight log at bedside, patient/family participation in use of log: \"yes    Patient's current weight today shows a difference of 1 lbs less than last documented weight.      Intake/Output Summary (Last 24 hours) at 5/4/2024 2025  Last data filed at 5/4/2024 1951  Gross per 24 hour   Intake 801.13 ml   Output 2900 ml   Net -2098.87 ml       Education Booklet Provided: yes    Comorbidities Reviewed Yes    Patient has a past medical history of Anemia, Angina, Arthritis, CAD (coronary artery disease), CHF (congestive heart failure) (HCC), CKD (chronic kidney disease) stage 3, GFR 30-59 ml/min (HCC), Clostridium difficile diarrhea, Diabetes mellitus (HCC), Diabetic neuropathy (HCC), Disease of blood and blood forming organ, Dizziness, GERD (gastroesophageal reflux disease), Headache, Hearing loss, Hyperlipidemia, Hypertension, Kidney stone, 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     >>For CHF and Comorbidity documentation on Education Time and Topics, please see Education Tab      Pt sitting in bed at this time on  1 L O2. Pt denies shortness of breath. Pt with pitting lower extremity edema.      Patient and/or Family's stated Goal of Care this Admission: reduce shortness of breath, increase activity tolerance, better understand heart failure and disease management, be more comfortable, and reduce lower extremity edema prior to discharge        :

## 2024-05-05 NOTE — PROGRESS NOTES
CHF Care Plan      Patient's EF (Ejection Fraction) is greater than 40%    Heart Failure Medications:  Diuretics:: Furosemide    (One of the following REQUIRED for EF </= 40%/SYSTOLIC FAILURE but MAY be used in EF% >40%/DIASTOLIC FAILURE)        ACE:: None        ARB:: None         ARNI:: None    (Beta Blockers)  NON- Evidenced Based Beta Blocker (for EF% >40%/DIASTOLIC FAILURE): None    Evidenced Based Beta Blocker::(REQUIRED for EF% <40%/SYSTOLIC FAILURE) Carvedilol- Coreg  ...................................................................................................................................................    Failed to redirect to the Timeline version of the [employer] SmartLink.      Patient's weights and intake/output reviewed    Daily Weight log at bedside, patient/family participation in use of log: \"yes    Patient's current weight today shows a difference of 0.5 lbs more than last documented weight.      Intake/Output Summary (Last 24 hours) at 5/5/2024 1041  Last data filed at 5/5/2024 0824  Gross per 24 hour   Intake 740 ml   Output 2900 ml   Net -2160 ml       Education Booklet Provided: yes    Comorbidities Reviewed Yes    Patient has a past medical history of Anemia, Angina, Arthritis, CAD (coronary artery disease), CHF (congestive heart failure) (HCC), CKD (chronic kidney disease) stage 3, GFR 30-59 ml/min (HCC), Clostridium difficile diarrhea, Diabetes mellitus (HCC), Diabetic neuropathy (HCC), Disease of blood and blood forming organ, Dizziness, GERD (gastroesophageal reflux disease), Headache, Hearing loss, Hyperlipidemia, Hypertension, Kidney stone, Refusal of blood transfusions as patient is [employer], Sleep apnea, Tinnitus, Venous ulcer (HCC), and Wound, open.     >>For CHF and Comorbidity documentation on Education Time and Topics, please see Education Tab      Pt resting in bed at this time on room air. Pt with complaints of shortness of breath. Pt with pitting lower extremity

## 2024-05-06 LAB
ANION GAP SERPL CALCULATED.3IONS-SCNC: 10 MMOL/L (ref 3–16)
BASOPHILS # BLD: 0 K/UL (ref 0–0.2)
BASOPHILS NFR BLD: 0.7 %
BUN SERPL-MCNC: 106 MG/DL (ref 7–20)
CALCIUM SERPL-MCNC: 9.1 MG/DL (ref 8.3–10.6)
CHLORIDE SERPL-SCNC: 90 MMOL/L (ref 99–110)
CO2 SERPL-SCNC: 34 MMOL/L (ref 21–32)
CREAT SERPL-MCNC: 2 MG/DL (ref 0.8–1.3)
DEPRECATED RDW RBC AUTO: 20.1 % (ref 12.4–15.4)
EOSINOPHIL # BLD: 0.2 K/UL (ref 0–0.6)
EOSINOPHIL NFR BLD: 2.8 %
GFR SERPLBLD CREATININE-BSD FMLA CKD-EPI: 35 ML/MIN/{1.73_M2}
GLUCOSE BLD-MCNC: 213 MG/DL (ref 70–99)
GLUCOSE BLD-MCNC: 225 MG/DL (ref 70–99)
GLUCOSE BLD-MCNC: 240 MG/DL (ref 70–99)
GLUCOSE BLD-MCNC: 263 MG/DL (ref 70–99)
GLUCOSE SERPL-MCNC: 231 MG/DL (ref 70–99)
HCT VFR BLD AUTO: 39.9 % (ref 40.5–52.5)
HGB BLD-MCNC: 13 G/DL (ref 13.5–17.5)
LYMPHOCYTES # BLD: 0.7 K/UL (ref 1–5.1)
LYMPHOCYTES NFR BLD: 13.4 %
MAGNESIUM SERPL-MCNC: 2.5 MG/DL (ref 1.8–2.4)
MCH RBC QN AUTO: 28 PG (ref 26–34)
MCHC RBC AUTO-ENTMCNC: 32.7 G/DL (ref 31–36)
MCV RBC AUTO: 85.8 FL (ref 80–100)
MONOCYTES # BLD: 0.7 K/UL (ref 0–1.3)
MONOCYTES NFR BLD: 12.4 %
NEUTROPHILS # BLD: 3.9 K/UL (ref 1.7–7.7)
NEUTROPHILS NFR BLD: 70.7 %
PERFORMED ON: ABNORMAL
PLATELET # BLD AUTO: 110 K/UL (ref 135–450)
PMV BLD AUTO: 9.1 FL (ref 5–10.5)
POTASSIUM SERPL-SCNC: 3.8 MMOL/L (ref 3.5–5.1)
RBC # BLD AUTO: 4.65 M/UL (ref 4.2–5.9)
SODIUM SERPL-SCNC: 134 MMOL/L (ref 136–145)
WBC # BLD AUTO: 5.6 K/UL (ref 4–11)

## 2024-05-06 PROCEDURE — 99232 SBSQ HOSP IP/OBS MODERATE 35: CPT | Performed by: NURSE PRACTITIONER

## 2024-05-06 PROCEDURE — 2700000000 HC OXYGEN THERAPY PER DAY

## 2024-05-06 PROCEDURE — 6360000002 HC RX W HCPCS: Performed by: INTERNAL MEDICINE

## 2024-05-06 PROCEDURE — 6370000000 HC RX 637 (ALT 250 FOR IP): Performed by: INTERNAL MEDICINE

## 2024-05-06 PROCEDURE — 80048 BASIC METABOLIC PNL TOTAL CA: CPT

## 2024-05-06 PROCEDURE — 36415 COLL VENOUS BLD VENIPUNCTURE: CPT

## 2024-05-06 PROCEDURE — 2580000003 HC RX 258: Performed by: INTERNAL MEDICINE

## 2024-05-06 PROCEDURE — 2580000003 HC RX 258: Performed by: NURSE PRACTITIONER

## 2024-05-06 PROCEDURE — 94761 N-INVAS EAR/PLS OXIMETRY MLT: CPT

## 2024-05-06 PROCEDURE — 6360000002 HC RX W HCPCS: Performed by: NURSE PRACTITIONER

## 2024-05-06 PROCEDURE — 85025 COMPLETE CBC W/AUTO DIFF WBC: CPT

## 2024-05-06 PROCEDURE — 1200000000 HC SEMI PRIVATE

## 2024-05-06 PROCEDURE — 83735 ASSAY OF MAGNESIUM: CPT

## 2024-05-06 PROCEDURE — 6370000000 HC RX 637 (ALT 250 FOR IP): Performed by: NURSE PRACTITIONER

## 2024-05-06 RX ORDER — SODIUM PHOSPHATE,MONO-DIBASIC 19G-7G/118
1 ENEMA (ML) RECTAL ONCE
Status: DISCONTINUED | OUTPATIENT
Start: 2024-05-06 | End: 2024-05-10 | Stop reason: HOSPADM

## 2024-05-06 RX ORDER — MINERAL OIL 100 G/100G
1 OIL RECTAL ONCE
Status: DISCONTINUED | OUTPATIENT
Start: 2024-05-06 | End: 2024-05-06

## 2024-05-06 RX ORDER — METOLAZONE 2.5 MG/1
2.5 TABLET ORAL ONCE
Status: COMPLETED | OUTPATIENT
Start: 2024-05-06 | End: 2024-05-06

## 2024-05-06 RX ADMIN — TIMOLOL MALEATE 1 DROP: 5 SOLUTION OPHTHALMIC at 21:17

## 2024-05-06 RX ADMIN — MIDODRINE HYDROCHLORIDE 5 MG: 5 TABLET ORAL at 08:04

## 2024-05-06 RX ADMIN — POTASSIUM BICARBONATE 20 MEQ: 782 TABLET, EFFERVESCENT ORAL at 12:47

## 2024-05-06 RX ADMIN — DOCUSATE SODIUM 100 MG: 100 CAPSULE, LIQUID FILLED ORAL at 08:04

## 2024-05-06 RX ADMIN — ATORVASTATIN CALCIUM 80 MG: 80 TABLET, FILM COATED ORAL at 21:16

## 2024-05-06 RX ADMIN — CARVEDILOL 3.12 MG: 3.12 TABLET, FILM COATED ORAL at 08:04

## 2024-05-06 RX ADMIN — DORZOLAMIDE HYDROCHLORIDE 1 DROP: 20 SOLUTION/ DROPS OPHTHALMIC at 08:15

## 2024-05-06 RX ADMIN — BRIMONIDINE TARTRATE 1 DROP: 2 SOLUTION OPHTHALMIC at 08:05

## 2024-05-06 RX ADMIN — TIMOLOL MALEATE 1 DROP: 5 SOLUTION OPHTHALMIC at 08:05

## 2024-05-06 RX ADMIN — FUROSEMIDE 10 MG/HR: 10 INJECTION, SOLUTION INTRAMUSCULAR; INTRAVENOUS at 01:26

## 2024-05-06 RX ADMIN — HEPARIN SODIUM 5000 UNITS: 5000 INJECTION INTRAVENOUS; SUBCUTANEOUS at 04:10

## 2024-05-06 RX ADMIN — OXYCODONE AND ACETAMINOPHEN 1 TABLET: 5; 325 TABLET ORAL at 04:10

## 2024-05-06 RX ADMIN — BRIMONIDINE TARTRATE 1 DROP: 2 SOLUTION OPHTHALMIC at 21:20

## 2024-05-06 RX ADMIN — POLYETHYLENE GLYCOL 3350 17 G: 17 POWDER, FOR SOLUTION ORAL at 08:04

## 2024-05-06 RX ADMIN — DORZOLAMIDE HYDROCHLORIDE 1 DROP: 20 SOLUTION/ DROPS OPHTHALMIC at 21:15

## 2024-05-06 RX ADMIN — PANTOPRAZOLE SODIUM 40 MG: 40 TABLET, DELAYED RELEASE ORAL at 08:04

## 2024-05-06 RX ADMIN — INSULIN LISPRO 1 UNITS: 100 INJECTION, SOLUTION INTRAVENOUS; SUBCUTANEOUS at 17:13

## 2024-05-06 RX ADMIN — INSULIN LISPRO 1 UNITS: 100 INJECTION, SOLUTION INTRAVENOUS; SUBCUTANEOUS at 08:14

## 2024-05-06 RX ADMIN — DOCUSATE SODIUM 100 MG: 100 CAPSULE, LIQUID FILLED ORAL at 21:16

## 2024-05-06 RX ADMIN — HEPARIN SODIUM 5000 UNITS: 5000 INJECTION INTRAVENOUS; SUBCUTANEOUS at 14:11

## 2024-05-06 RX ADMIN — PREGABALIN 50 MG: 25 CAPSULE ORAL at 08:04

## 2024-05-06 RX ADMIN — CARVEDILOL 3.12 MG: 3.12 TABLET, FILM COATED ORAL at 17:13

## 2024-05-06 RX ADMIN — METOLAZONE 2.5 MG: 2.5 TABLET ORAL at 12:47

## 2024-05-06 RX ADMIN — MIDODRINE HYDROCHLORIDE 5 MG: 5 TABLET ORAL at 17:13

## 2024-05-06 RX ADMIN — OXYCODONE AND ACETAMINOPHEN 1 TABLET: 5; 325 TABLET ORAL at 14:11

## 2024-05-06 RX ADMIN — OXYCODONE AND ACETAMINOPHEN 1 TABLET: 5; 325 TABLET ORAL at 21:22

## 2024-05-06 RX ADMIN — PREGABALIN 50 MG: 25 CAPSULE ORAL at 21:16

## 2024-05-06 RX ADMIN — HEPARIN SODIUM 5000 UNITS: 5000 INJECTION INTRAVENOUS; SUBCUTANEOUS at 21:17

## 2024-05-06 RX ADMIN — FUROSEMIDE 15 MG/HR: 10 INJECTION, SOLUTION INTRAMUSCULAR; INTRAVENOUS at 22:30

## 2024-05-06 RX ADMIN — INSULIN GLARGINE 40 UNITS: 100 INJECTION, SOLUTION SUBCUTANEOUS at 21:14

## 2024-05-06 RX ADMIN — ALLOPURINOL 300 MG: 300 TABLET ORAL at 08:04

## 2024-05-06 RX ADMIN — TAMSULOSIN HYDROCHLORIDE 0.8 MG: 0.4 CAPSULE ORAL at 08:04

## 2024-05-06 RX ADMIN — FUROSEMIDE 10 MG/HR: 10 INJECTION, SOLUTION INTRAMUSCULAR; INTRAVENOUS at 14:09

## 2024-05-06 RX ADMIN — Medication 10 ML: at 21:20

## 2024-05-06 RX ADMIN — INSULIN LISPRO 1 UNITS: 100 INJECTION, SOLUTION INTRAVENOUS; SUBCUTANEOUS at 12:51

## 2024-05-06 RX ADMIN — ASPIRIN 81 MG: 81 TABLET, COATED ORAL at 08:04

## 2024-05-06 RX ADMIN — POLYETHYLENE GLYCOL 3350 17 G: 17 POWDER, FOR SOLUTION ORAL at 21:14

## 2024-05-06 ASSESSMENT — PAIN DESCRIPTION - ORIENTATION
ORIENTATION: RIGHT;LEFT
ORIENTATION: LOWER;LEFT;RIGHT
ORIENTATION: RIGHT;LEFT

## 2024-05-06 ASSESSMENT — PAIN DESCRIPTION - LOCATION
LOCATION: BACK;FOOT
LOCATION: LEG
LOCATION: LEG

## 2024-05-06 ASSESSMENT — PAIN - FUNCTIONAL ASSESSMENT: PAIN_FUNCTIONAL_ASSESSMENT: ACTIVITIES ARE NOT PREVENTED

## 2024-05-06 ASSESSMENT — PAIN DESCRIPTION - DESCRIPTORS
DESCRIPTORS: ACHING
DESCRIPTORS: NAGGING

## 2024-05-06 ASSESSMENT — PAIN SCALES - GENERAL
PAINLEVEL_OUTOF10: 7
PAINLEVEL_OUTOF10: 8
PAINLEVEL_OUTOF10: 7
PAINLEVEL_OUTOF10: 0

## 2024-05-06 NOTE — PROGRESS NOTES
CHF Care Plan      Patient's EF (Ejection Fraction) is greater than 40%    Heart Failure Medications:  Diuretics:: Furosemide, Torsemide, Spironolactone, Metalozone, Other, and None    (One of the following REQUIRED for EF </= 40%/SYSTOLIC FAILURE but MAY be used in EF% >40%/DIASTOLIC FAILURE)        ACE:: None        ARB:: None         ARNI:: None    (Beta Blockers)  NON- Evidenced Based Beta Blocker (for EF% >40%/DIASTOLIC FAILURE): None    Evidenced Based Beta Blocker::(REQUIRED for EF% <40%/SYSTOLIC FAILURE) Carvedilol- Coreg  ...................................................................................................................................................    Failed to redirect to the Timeline version of the [employer] SmartLink.      Patient's weights and intake/output reviewed    Daily Weight log at bedside, patient/family participation in use of log: \"yes    Patient's current weight today 261.2 lb      Intake/Output Summary (Last 24 hours) at 5/6/2024 0523  Last data filed at 5/6/2024 0107  Gross per 24 hour   Intake 1231.31 ml   Output 2100 ml   Net -868.69 ml       Education Booklet Provided: yes    Comorbidities Reviewed Yes    Patient has a past medical history of Anemia, Angina, Arthritis, CAD (coronary artery disease), CHF (congestive heart failure) ([facility]), CKD (chronic kidney disease) stage 3, GFR 30-59 ml/min ([facility]), Clostridium difficile diarrhea, Diabetes mellitus ([facility]), Diabetic neuropathy ([facility]), Disease of blood and blood forming organ, Dizziness, GERD (gastroesophageal reflux disease), Headache, Hearing loss, Hyperlipidemia, Hypertension, Kidney stone, Refusal of blood transfusions as patient is Restoration[employer], Sleep apnea, Tinnitus, Venous ulcer (HCC), and Wound, open.     >>For CHF and Comorbidity documentation on Education Time and Topics, please see Education Tab      Pt resting in bed at this time on  1 L O2. Pt denies shortness of breath. Pt with pitting lower extremity edema.      Patient and/or Family's stated Goal of Care this Admission: reduce shortness of breath, increase activity tolerance, better understand heart failure and disease management, be more comfortable, and reduce lower extremity edema prior to discharge        :

## 2024-05-06 NOTE — PROGRESS NOTES
[facility] Progress Note           Subjective/   69 y.o. year old male who we are seeing in consultation for CKD stage 3b.     HPI:  Renal function worsening with diuresis, non-oliguric though still gaining weight.  States shortness of breath better, on 1 L NC, continues to remain edematous.    ROS:  Intake adequate, +weak.    Objective/   GEN:  Chronically ill, /74   Pulse 83   Temp 97.9 °F (36.6 °C) (Oral)   Resp 16   Ht 1.727 m (5' 8\")   Wt 119.4 kg (263 lb 3.2 oz)   SpO2 94%   BMI 40.02 kg/m²   HEENT: non-icteric, no JVD  CV: S1, S2 without m/r/g; +++ LE edema  RESP: CTA B without w/r/r; breathing wnl  ABD: +bs, soft, nt, no hsm  SKIN: warm, no rashes    Data/  Recent Labs     05/05/24  0452 05/06/24  0441   WBC 6.1 5.6   HGB 13.3* 13.0*   HCT 41.0 39.9*   MCV 85.4 85.8   * 110*     Recent Labs     05/04/24  0507 05/05/24  0452 05/06/24  0441   * 135* 134*   K 4.1 4.0 3.8   CL 90* 91* 90*   CO2 31 35* 34*   GLUCOSE 226* 253* 231*   MG 2.60* 2.40 2.50*   * 102* 106*   CREATININE 1.6* 1.6* 2.0*   LABGLOM 46* 46* 35*       Assessment/     - Chronic kidney disease stage 3b   SCr 1.5-1.7    - CHF    - Hyponatremia    - Hypertension      Plan/     - Lasix drip 10 mg/hour, add prn metolazone today  - Trend labs, bp's, weights, & urine output    ____________________________________  Ben Bryant MD  [facility]  [email or web address]  Office: [phone]

## 2024-05-06 NOTE — PROGRESS NOTES
[facility]   Heart Failure Daily Progress Note    Admit Date:  5/2/2024  HPI:    Chief Complaint   Patient presents with    Shortness of Breath     10 lb weight gain past week, placed on 3L this past week, usually does not wear oxygen, increasing sob, resides at [facility],         Shay Townsend is being followed for shortness of breath. Known patient to me from CHF clinic. Resides at [facility].   Hx of CAD s/p CABG and CKD.     Interval history: remains on lasix infusion 10mg/hr  Given Dose of 2.5mg metolazone- around 1pm.     Subjective:  Mr. Townsend continues with significant BLE edema.   No chest pain, some chest tightness.   Doesn't feel like he is urinating as much as he should be.     He reports the metolazone causes him to feel poorly if he gets it more than once a week.     Objective:   BP (!) 95/52   Pulse 87   Temp 97.9 °F (36.6 °C) (Oral)   Resp 17   Ht 1.727 m (5' 8\")   Wt 119.4 kg (263 lb 3.2 oz)   SpO2 97%   BMI 40.02 kg/m²     Intake/Output Summary (Last 24 hours) at 5/6/2024 1351  Last data filed at 5/6/2024 1319  Gross per 24 hour   Intake 1411.31 ml   Output 2600 ml   Net -1188.69 ml       NYHA: IV    Physical Exam:  General:  Awake, alert, NAD, able to sit up on the side of the bed.   Skin:  Warm and dry  Chest:  crackles in the bases to auscultation, no wheezes/rhonchi  Cardiovascular:  irregular, reg S1S2, no m/r/g   Abdomen:  Soft, nontender, +bowel sounds  Extremities:  ++ bilateral lower extremity edema    Medications:    sodium phosphate  1 enema Rectal Once    polyethylene glycol  17 g Oral BID    midodrine  5 mg Oral BID WC    allopurinol  300 mg Oral Daily    aspirin EC  81 mg Oral Daily    atorvastatin  80 mg Oral Nightly    carvedilol  3.125 mg Oral BID WC    docusate sodium  100 mg Oral BID    dorzolamide  1 drop Left Eye BID    insulin glargine  40 Units SubCUTAneous Nightly    linaclotide  290 mcg Oral QAM AC    pantoprazole  40 mg Oral Daily     correlate   clinically.     8/26/22 Echo  Limited visualization due to body habitus. Definity® used for myocardial border enhancement. Left ventricular systolic function is low normal with a visually estimated ejection fraction of 50-55%. The left ventricle is normal in size with mild concentric hypertrophy. The interventricular septum during systole and diastole is flattened. This is consistent with right ventricular pressure and volume overload. Grade III diastolic dysfunction with elevated LV pressure. There is no evidence of a left ventricular thrombus. The right ventricle is mildly dilated. Right ventricular systolic function is reduced. The left atrium appears severely enlarged. The right atrium is mildly enlarged. The aortic valve appears functionally bicuspid with severely calcified and fused left and non-coronary cusps. Moderate aortic stenosis and regurgitation. Mild mitral, pulmonic, and tricuspid regurgitation. Systolic pulmonary artery pressure (SPAP) is elevated and estimated at 81 mmHg (right atrial pressure 15 mmHg) consistent with severe pulmonary hypertension. The IVC is dilated in size (>2.1 cm) and collapses <50% with respiration consistent with markedly elevated right atrial pressures (15      3/8/22 Lexiscan-Myoview:  Summary   Abnormal moderate risk myocardial perfusion study.   There is a large area of mixed ischemia and scar within the infero-apical,   apical-lateral, lateral, posterior-lateral, and posterior-basal segments.   The left ventricular size is moderately dilated.   The estimated left ventricular function is 67%.     Telemetry independently reviewed and interpreted by me and shows: atrial fibrillation      05/06/24 0617 119.4 kg (263 lb 3.2 oz) Standing scale      05/05/24 0448 118.8 kg (261 lb 12.8 oz) Standing scale     05/04/24 0458 118.5 kg (261 lb 3.2 oz) Standing scale     05/03/24 0451 119.1 kg (262 lb 8 oz) Standing scale     05/02/24 1415 123.8 kg (273 lb) --        Principal Problem:    CHF NYHA class I, acute, diastolic (HCC)  Active Problems:    S/P CABG (coronary artery bypass graft)  Resolved Problems:    * No resolved hospital problems. *      Assessment:  Acute on chronic biventricular Heart failure  Aortic stenosis  CAD s/p CABG  PAF (declines anticoagulation)   CKD  Anemia  Thrombocytopenia      Plan:  Daily weights, Daily BMP, Strict I/O's  Continue lasix infusion- discussed with nephrology increase to 15mg/hr  Continue aspirin and statin  Continue coreg   Already on midodrine 2.5mg BID      Education provided today Disease process and medications discussed. Questions answered fully.  Encouraged daily monitoring of the patient's weight.  Time spent educating today 15 minutes    PATRICK Hargrove - CNP,  5/6/2024, 1:51 PM

## 2024-05-06 NOTE — PROGRESS NOTES
Pt states that during the day, the bed alarm is not on, as he is trusted to call out for help. Informed SIGRID Villarreal, and pt's bed alarm is staying off. Pt was educated on safety concerns.

## 2024-05-06 NOTE — PLAN OF CARE
Problem: Discharge Planning  Goal: Discharge to home or other facility with appropriate resources  Outcome: Progressing     Problem: Pain  Goal: Verbalizes/displays adequate comfort level or baseline comfort level  Outcome: Progressing     Problem: Skin/Tissue Integrity  Goal: Absence of new skin breakdown  Description: 1.  Monitor for areas of redness and/or skin breakdown  2.  Assess vascular access sites hourly  3.  Every 4-6 hours minimum:  Change oxygen saturation probe site  4.  Every 4-6 hours:  If on nasal continuous positive airway pressure, respiratory therapy assess nares and determine need for appliance change or resting period.  Outcome: Progressing     Problem: Safety - Adult  Goal: Free from fall injury  Outcome: Progressing     Problem: Chronic Conditions and Co-morbidities  Goal: Patient's chronic conditions and co-morbidity symptoms are monitored and maintained or improved  Outcome: Progressing     Problem: Neurosensory - Adult  Goal: Achieves stable or improved neurological status  Outcome: Progressing     Problem: Respiratory - Adult  Goal: Achieves optimal ventilation and oxygenation  Outcome: Progressing     Problem: Cardiovascular - Adult  Goal: Maintains optimal cardiac output and hemodynamic stability  Outcome: Progressing     Problem: Skin/Tissue Integrity - Adult  Goal: Skin integrity remains intact  Outcome: Progressing  Flowsheets (Taken 5/5/2024 1036 by Rebeka Garvey RN)  Skin Integrity Remains Intact: Monitor for areas of redness and/or skin breakdown     Problem: Musculoskeletal - Adult  Goal: Return mobility to safest level of function  Outcome: Progressing     Problem: Gastrointestinal - Adult  Goal: Minimal or absence of nausea and vomiting  Outcome: Progressing     Problem: Genitourinary - Adult  Goal: Absence of urinary retention  Outcome: Progressing     Problem: Metabolic/Fluid and Electrolytes - Adult  Goal: Electrolytes maintained within normal limits  Outcome:

## 2024-05-06 NOTE — DISCHARGE INSTRUCTIONS
Heart Failure Resources:  Heart Failure Interactive Workbook:  Go to [email or web address] for a Free Heart Failure Interactive Workbook provided by The American Heart Association. This interactive workbook will provide information on Healthier Living with Heart Failure. Please copy and paste link into search bar. Use your mouse to scroll through the pages.    HF [place] carmina:   Heart Failure Free smart phone carmina available for iPhone and Android download. Use your phone to track sodium intake, fluid intake, symptoms, and weight.     Low Sodium Diet / Recipes:  Go to www.[employer] website for “renal” diet which is Low Sodium! [employer] is a dialysis company, but this website offers free seasonal cookbooks. Each quarter, they will release 25-30 new recipes with a breakdown of calories, sodium, and glucose. You can also go to www.[employer]/recipes website for free recipes.     Home Exercise Program:   Identification of Green/Yellow/Red zones:  You should be able to identify when you feel good (green zone), if you have 1-2 symptoms of HF (yellow zone), or if you are in need of medical attention (red zone).  In your CHF education folder you were provided a “stop light tool” to outline this information.     We want to you to rate your exertion levels:    Our therapy team has discussed means of identification with you such as the \"Alana scale.\"  The Alana rating scale ranges from 6 to 20, where 6 means \"no exertion at all\" and 20 means \"maximal exertion.\" The goal is to use this to gauge how much effort it is taking for you to do your normal daily tasks.   You should be able to recognize when too much exertion is being expended.    Elements of Energy Conservation:   Prioritize/Plan: Decide what needs to be done today, and what can wait for a later date, write to do lists, plan ahead to avoid extra trips, and gather supplies and equipment needed before starting an activity.   Position:

## 2024-05-06 NOTE — PLAN OF CARE
Problem: Pain  Goal: Verbalizes/displays adequate comfort level or baseline comfort level  5/6/2024 0821 by Arlyn Corado, RN  Outcome: Progressing  Flowsheets (Taken 5/6/2024 0800)  Verbalizes/displays adequate comfort level or baseline comfort level: Encourage patient to monitor pain and request assistance  5/6/2024 0033 by Cherelle Martinez, RN  Outcome: Progressing     Problem: Skin/Tissue Integrity  Goal: Absence of new skin breakdown  Description: 1.  Monitor for areas of redness and/or skin breakdown  2.  Assess vascular access sites hourly  3.  Every 4-6 hours minimum:  Change oxygen saturation probe site  4.  Every 4-6 hours:  If on nasal continuous positive airway pressure, respiratory therapy assess nares and determine need for appliance change or resting period.  5/6/2024 0821 by Arlyn Corado, RN  Outcome: Progressing  5/6/2024 0033 by Cherelle Martinez, RN  Outcome: Progressing

## 2024-05-06 NOTE — PROGRESS NOTES
Hospital Medicine Progress Note      Date of Admission: 5/2/2024  Hospital Day: 5    Chief Admission Complaint:    Chief Complaint   Patient presents with    Shortness of Breath     10 lb weight gain past week, placed on 3L this past week, usually does not wear oxygen, increasing sob, resides at [facility],      Subjective:    Wt up to 119.4kg today. Net negative 7.2L    Cr up to 2.0. Metolazone per nephrology today.    Pt c/o constipation, ordered fleet enema for now.    Presenting Admission History:       69 y.o. male who presented to [facility] with  above c/o ..  PMHx significant for CKD diastolic CHF CAD CABG diabetes hypertension and hyperlipidemia.  He is a long-term care resident and started with shortness of breath few days ago  Is worsened and currently he needs oxygen  He was sent to the emergency room for further evaluation  He is on 3 L oxygen and comfortable now  He denies chest pain cough sputum fever change in mental status nausea vomiting diarrhea or any urinary complaints.  He feels his stomach is more and more distended   He has chronic venous stasis with edema and erythematous changes over lower extremities  He is a Jehovah witness     Assessment/Plan:       Current Principal Problem:  CHF NYHA class I, acute, diastolic (HCC)     Acute on chronic diastolic congestive heart failure, appears decompensated  - TTE on 08/26/22 with LV systolic function low normal with EF 50-55%. Grade 3 DD. Right ventricular systolic function is reduced. Aortic valve appears functionally bicuspid with severely calcified and fused left and non-coronary cusps. Moderate aortic stenosis and regurgitation.  - Repeat Echo on 05/03/24 with LVEF 50-54% with indeterminate relaxation. Septal flattening is noted consistent with RV volume/pressure overload. RV poorly visualized, but appears moderately dilated with reduced systolic function. Severely enlarged right atrium. Calcified aortic valve with moderate stenosis and  Dimesylate  1 drop Left Eye Nightly    tamsulosin  0.8 mg Oral Daily    sodium chloride flush  5-40 mL IntraVENous 2 times per day    heparin (porcine)  5,000 Units SubCUTAneous 3 times per day    insulin lispro  0-4 Units SubCUTAneous TID WC    insulin lispro  0-4 Units SubCUTAneous Nightly    brimonidine  1 drop Left Eye BID    And    timolol  1 drop Left Eye BID     PRN Meds: diphenhydrAMINE, oxyCODONE-acetaminophen, sodium chloride flush, sodium chloride, ondansetron **OR** [DISCONTINUED] ondansetron, acetaminophen **OR** acetaminophen, perflutren lipid microspheres, glucose, dextrose bolus **OR** dextrose bolus, glucagon (rDNA), dextrose     Labs:  Personally reviewed and interpreted for clinical significance.     Recent Labs     05/05/24 0452 05/06/24 0441   WBC 6.1 5.6   HGB 13.3* 13.0*   HCT 41.0 39.9*   * 110*       Recent Labs     05/04/24  0507 05/05/24 0452 05/06/24  0441   * 135* 134*   K 4.1 4.0 3.8   CL 90* 91* 90*   CO2 31 35* 34*   * 102* 106*   CREATININE 1.6* 1.6* 2.0*   CALCIUM 9.4 9.3 9.1   MG 2.60* 2.40 2.50*       No results for input(s): \"PROBNP\", \"TROPHS\" in the last 72 hours.    No results for input(s): \"LABA1C\" in the last 72 hours.    No results for input(s): \"AST\", \"ALT\", \"BILIDIR\", \"BILITOT\", \"ALKPHOS\" in the last 72 hours.    No results for input(s): \"INR\", \"LACTA\", \"TSH\" in the last 72 hours.      Urine Cultures:   Lab Results   Component Value Date/Time    LABURIN  05/22/2018 02:30 PM     <10,000 CFU/ml mixed skin/urogenital deysi. No further workup     Blood Cultures:   Lab Results   Component Value Date/Time    BC No Growth after 4 days of incubation. 03/07/2022 04:30 PM     Lab Results   Component Value Date/Time    BLOODCULT2 No Growth after 4 days of incubation. 03/07/2022 04:34 PM     Organism:   Lab Results   Component Value Date/Time    ORG Pseudomonas aeruginosa 09/18/2020 03:23 PM    ORG Enterococcus faecalis 09/18/2020 03:23 PM         Gabi Goodrich  Pete, APRN

## 2024-05-07 LAB
ANION GAP SERPL CALCULATED.3IONS-SCNC: 10 MMOL/L (ref 3–16)
BASOPHILS # BLD: 0 K/UL (ref 0–0.2)
BASOPHILS NFR BLD: 0.7 %
BUN SERPL-MCNC: 109 MG/DL (ref 7–20)
CALCIUM SERPL-MCNC: 9.3 MG/DL (ref 8.3–10.6)
CHLORIDE SERPL-SCNC: 87 MMOL/L (ref 99–110)
CO2 SERPL-SCNC: 35 MMOL/L (ref 21–32)
CREAT SERPL-MCNC: 2 MG/DL (ref 0.8–1.3)
DEPRECATED RDW RBC AUTO: 19 % (ref 12.4–15.4)
EOSINOPHIL # BLD: 0.2 K/UL (ref 0–0.6)
EOSINOPHIL NFR BLD: 4.5 %
GFR SERPLBLD CREATININE-BSD FMLA CKD-EPI: 35 ML/MIN/{1.73_M2}
GLUCOSE BLD-MCNC: 208 MG/DL (ref 70–99)
GLUCOSE BLD-MCNC: 246 MG/DL (ref 70–99)
GLUCOSE BLD-MCNC: 250 MG/DL (ref 70–99)
GLUCOSE BLD-MCNC: 252 MG/DL (ref 70–99)
GLUCOSE SERPL-MCNC: 241 MG/DL (ref 70–99)
HCT VFR BLD AUTO: 40.5 % (ref 40.5–52.5)
HGB BLD-MCNC: 13.1 G/DL (ref 13.5–17.5)
LYMPHOCYTES # BLD: 0.7 K/UL (ref 1–5.1)
LYMPHOCYTES NFR BLD: 13 %
MAGNESIUM SERPL-MCNC: 2.2 MG/DL (ref 1.8–2.4)
MCH RBC QN AUTO: 27.7 PG (ref 26–34)
MCHC RBC AUTO-ENTMCNC: 32.4 G/DL (ref 31–36)
MCV RBC AUTO: 85.4 FL (ref 80–100)
MONOCYTES # BLD: 0.5 K/UL (ref 0–1.3)
MONOCYTES NFR BLD: 10.3 %
NEUTROPHILS # BLD: 3.8 K/UL (ref 1.7–7.7)
NEUTROPHILS NFR BLD: 71.5 %
PERFORMED ON: ABNORMAL
PLATELET # BLD AUTO: 105 K/UL (ref 135–450)
PMV BLD AUTO: 9.1 FL (ref 5–10.5)
POTASSIUM SERPL-SCNC: 3.4 MMOL/L (ref 3.5–5.1)
RBC # BLD AUTO: 4.74 M/UL (ref 4.2–5.9)
SODIUM SERPL-SCNC: 132 MMOL/L (ref 136–145)
WBC # BLD AUTO: 5.3 K/UL (ref 4–11)

## 2024-05-07 PROCEDURE — 6360000002 HC RX W HCPCS: Performed by: INTERNAL MEDICINE

## 2024-05-07 PROCEDURE — 6360000002 HC RX W HCPCS: Performed by: NURSE PRACTITIONER

## 2024-05-07 PROCEDURE — 6370000000 HC RX 637 (ALT 250 FOR IP): Performed by: INTERNAL MEDICINE

## 2024-05-07 PROCEDURE — 1200000000 HC SEMI PRIVATE

## 2024-05-07 PROCEDURE — 94761 N-INVAS EAR/PLS OXIMETRY MLT: CPT

## 2024-05-07 PROCEDURE — 99232 SBSQ HOSP IP/OBS MODERATE 35: CPT | Performed by: NURSE PRACTITIONER

## 2024-05-07 PROCEDURE — 83735 ASSAY OF MAGNESIUM: CPT

## 2024-05-07 PROCEDURE — 97530 THERAPEUTIC ACTIVITIES: CPT

## 2024-05-07 PROCEDURE — 6370000000 HC RX 637 (ALT 250 FOR IP): Performed by: NURSE PRACTITIONER

## 2024-05-07 PROCEDURE — 80048 BASIC METABOLIC PNL TOTAL CA: CPT

## 2024-05-07 PROCEDURE — 2580000003 HC RX 258: Performed by: INTERNAL MEDICINE

## 2024-05-07 PROCEDURE — 97166 OT EVAL MOD COMPLEX 45 MIN: CPT

## 2024-05-07 PROCEDURE — 97116 GAIT TRAINING THERAPY: CPT

## 2024-05-07 PROCEDURE — 36415 COLL VENOUS BLD VENIPUNCTURE: CPT

## 2024-05-07 PROCEDURE — 85025 COMPLETE CBC W/AUTO DIFF WBC: CPT

## 2024-05-07 PROCEDURE — 97162 PT EVAL MOD COMPLEX 30 MIN: CPT

## 2024-05-07 PROCEDURE — 2700000000 HC OXYGEN THERAPY PER DAY

## 2024-05-07 PROCEDURE — 2580000003 HC RX 258: Performed by: NURSE PRACTITIONER

## 2024-05-07 RX ADMIN — INSULIN LISPRO 1 UNITS: 100 INJECTION, SOLUTION INTRAVENOUS; SUBCUTANEOUS at 12:33

## 2024-05-07 RX ADMIN — HEPARIN SODIUM 5000 UNITS: 5000 INJECTION INTRAVENOUS; SUBCUTANEOUS at 13:39

## 2024-05-07 RX ADMIN — INSULIN LISPRO 1 UNITS: 100 INJECTION, SOLUTION INTRAVENOUS; SUBCUTANEOUS at 08:16

## 2024-05-07 RX ADMIN — POLYETHYLENE GLYCOL 3350 17 G: 17 POWDER, FOR SOLUTION ORAL at 08:14

## 2024-05-07 RX ADMIN — INSULIN GLARGINE 40 UNITS: 100 INJECTION, SOLUTION SUBCUTANEOUS at 20:21

## 2024-05-07 RX ADMIN — ATORVASTATIN CALCIUM 80 MG: 80 TABLET, FILM COATED ORAL at 20:23

## 2024-05-07 RX ADMIN — TIMOLOL MALEATE 1 DROP: 5 SOLUTION OPHTHALMIC at 20:21

## 2024-05-07 RX ADMIN — BRIMONIDINE TARTRATE 1 DROP: 2 SOLUTION OPHTHALMIC at 20:22

## 2024-05-07 RX ADMIN — Medication 10 ML: at 20:28

## 2024-05-07 RX ADMIN — FUROSEMIDE 15 MG/HR: 10 INJECTION, SOLUTION INTRAMUSCULAR; INTRAVENOUS at 11:40

## 2024-05-07 RX ADMIN — TIMOLOL MALEATE 1 DROP: 5 SOLUTION OPHTHALMIC at 08:18

## 2024-05-07 RX ADMIN — HEPARIN SODIUM 5000 UNITS: 5000 INJECTION INTRAVENOUS; SUBCUTANEOUS at 05:50

## 2024-05-07 RX ADMIN — FUROSEMIDE 15 MG/HR: 10 INJECTION, SOLUTION INTRAMUSCULAR; INTRAVENOUS at 19:08

## 2024-05-07 RX ADMIN — OXYCODONE AND ACETAMINOPHEN 1 TABLET: 5; 325 TABLET ORAL at 08:25

## 2024-05-07 RX ADMIN — POLYETHYLENE GLYCOL 3350 17 G: 17 POWDER, FOR SOLUTION ORAL at 20:23

## 2024-05-07 RX ADMIN — DOCUSATE SODIUM 100 MG: 100 CAPSULE, LIQUID FILLED ORAL at 20:23

## 2024-05-07 RX ADMIN — TAMSULOSIN HYDROCHLORIDE 0.8 MG: 0.4 CAPSULE ORAL at 08:16

## 2024-05-07 RX ADMIN — MIDODRINE HYDROCHLORIDE 5 MG: 5 TABLET ORAL at 17:38

## 2024-05-07 RX ADMIN — BRIMONIDINE TARTRATE 1 DROP: 2 SOLUTION OPHTHALMIC at 08:15

## 2024-05-07 RX ADMIN — ACETAZOLAMIDE SODIUM 500 MG: 500 INJECTION, POWDER, LYOPHILIZED, FOR SOLUTION INTRAVENOUS at 11:47

## 2024-05-07 RX ADMIN — PREGABALIN 50 MG: 25 CAPSULE ORAL at 08:16

## 2024-05-07 RX ADMIN — ASPIRIN 81 MG: 81 TABLET, COATED ORAL at 08:16

## 2024-05-07 RX ADMIN — FUROSEMIDE 15 MG/HR: 10 INJECTION, SOLUTION INTRAMUSCULAR; INTRAVENOUS at 04:27

## 2024-05-07 RX ADMIN — PANTOPRAZOLE SODIUM 40 MG: 40 TABLET, DELAYED RELEASE ORAL at 08:16

## 2024-05-07 RX ADMIN — PREGABALIN 50 MG: 25 CAPSULE ORAL at 20:23

## 2024-05-07 RX ADMIN — DORZOLAMIDE HYDROCHLORIDE 1 DROP: 20 SOLUTION/ DROPS OPHTHALMIC at 08:21

## 2024-05-07 RX ADMIN — HEPARIN SODIUM 5000 UNITS: 5000 INJECTION INTRAVENOUS; SUBCUTANEOUS at 20:22

## 2024-05-07 RX ADMIN — DOCUSATE SODIUM 100 MG: 100 CAPSULE, LIQUID FILLED ORAL at 08:16

## 2024-05-07 RX ADMIN — MIDODRINE HYDROCHLORIDE 5 MG: 5 TABLET ORAL at 08:20

## 2024-05-07 RX ADMIN — ALLOPURINOL 300 MG: 300 TABLET ORAL at 08:16

## 2024-05-07 RX ADMIN — POTASSIUM BICARBONATE 40 MEQ: 782 TABLET, EFFERVESCENT ORAL at 10:47

## 2024-05-07 RX ADMIN — DORZOLAMIDE HYDROCHLORIDE 1 DROP: 20 SOLUTION/ DROPS OPHTHALMIC at 20:22

## 2024-05-07 RX ADMIN — INSULIN LISPRO 2 UNITS: 100 INJECTION, SOLUTION INTRAVENOUS; SUBCUTANEOUS at 17:37

## 2024-05-07 ASSESSMENT — PAIN SCALES - GENERAL
PAINLEVEL_OUTOF10: 7
PAINLEVEL_OUTOF10: 7
PAINLEVEL_OUTOF10: 5
PAINLEVEL_OUTOF10: 8
PAINLEVEL_OUTOF10: 7
PAINLEVEL_OUTOF10: 6

## 2024-05-07 ASSESSMENT — PAIN DESCRIPTION - DESCRIPTORS
DESCRIPTORS: ACHING
DESCRIPTORS: SPASM
DESCRIPTORS: SPASM
DESCRIPTORS: NAGGING
DESCRIPTORS: SPASM

## 2024-05-07 ASSESSMENT — PAIN DESCRIPTION - LOCATION
LOCATION: NECK
LOCATION: NECK;BACK;FOOT
LOCATION: BACK
LOCATION: BACK
LOCATION: BACK;FOOT
LOCATION: NECK

## 2024-05-07 ASSESSMENT — PAIN - FUNCTIONAL ASSESSMENT
PAIN_FUNCTIONAL_ASSESSMENT: ACTIVITIES ARE NOT PREVENTED

## 2024-05-07 ASSESSMENT — PAIN DESCRIPTION - ORIENTATION
ORIENTATION: LOWER;LEFT;RIGHT
ORIENTATION: MID;POSTERIOR;RIGHT;LEFT
ORIENTATION: MID

## 2024-05-07 NOTE — PROGRESS NOTES
Hospital Medicine Progress Note      Date of Admission: 5/2/2024  Hospital Day: 6    Chief Admission Complaint:    Chief Complaint   Patient presents with    Shortness of Breath     10 lb weight gain past week, placed on 3L this past week, usually does not wear oxygen, increasing sob, resides at [facility],      Subjective:        CRT stable at 2.0 today.  Received Metozalone yesterday and will receive Diamox today.   No complaints today except he feels he should have lost more weight than he has.   He is down 10 pounds since admission and 10L.      Presenting Admission History:       This is a 69 y.o. male who presented to [facility] with  above c/o ..  PMHx significant for CKD diastolic CHF CAD CABG diabetes hypertension and hyperlipidemia.  He is a long-term care resident and started with shortness of breath few days ago  Is worsened and currently he needs oxygen  He was sent to the emergency room for further evaluation  He is on 3 L oxygen and comfortable now  He denies chest pain cough sputum fever change in mental status nausea vomiting diarrhea or any urinary complaints.  He feels his stomach is more and more distended   He has chronic venous stasis with edema and erythematous changes over lower extremities  He is a Jehovah witness     Assessment/Plan:       Current Principal Problem:  CHF NYHA class I, acute, diastolic (HCC)     Acute on chronic diastolic congestive heart failure, appears decompensated  - TTE on 08/26/22 with LV systolic function low normal with EF 50-55%. Grade 3 DD. Right ventricular systolic function is reduced. Aortic valve appears functionally bicuspid with severely calcified and fused left and non-coronary cusps. Moderate aortic stenosis and regurgitation.  - Repeat Echo on 05/03/24 with LVEF 50-54% with indeterminate relaxation. Septal flattening is noted consistent with RV volume/pressure overload. RV poorly visualized, but appears moderately dilated with reduced systolic  Aggressive IV diuresis requiring serial monitoring for renal impairment and electrolyte derangements  [] Hypertonic Saline requiring serial renal monitoring for appropriate electrolyte correction rate   [] Critical electrolyte abnormalities requiring IV replacement and close serial monitoring  [] Insulin - monitoring FSBS for Hypoglycemic ADR  [] Anticoagulation requiring close serial monitoring and dose adjustments at high risk of ADR   [] HD requiring close serial monitoring of electrolytes and fluid status  [] Other -  [] Change in code status:    [] Decision to escalate care:    [] Major surgery/procedure with associated risk factors:    ----------------------------------------------------------------------  C. Data (any 2)  [] Discussed management of the case with consultants as follows:    [x] Discussed the discharge plan in detail with case mgt including timing/barriers to discharge, need for support services and placement decision   [] Imaging personally reviewed and interpreted, includes:   [x] Telemetry monitoring as noted above  [x] Data Review (any 3)  [x] Collateral history obtained from:  pt, review of emr  [x] All available Consultant notes from yesterday/today were reviewed  [x] All current labs were reviewed and interpreted for clinical significance   [x] Appropriate follow-up labs were ordered    Medications:  Personally reviewed in detail in conjunction w/ labs as documented for evidence of drug toxicity.     Infusion Medications    furosemide (LASIX) 100 mg in sodium chloride 0.9 % 100 mL infusion 15 mg/hr (05/07/24 9588)    sodium chloride      dextrose       Scheduled Medications    sodium phosphate  1 enema Rectal Once    polyethylene glycol  17 g Oral BID    midodrine  5 mg Oral BID WC    allopurinol  300 mg Oral Daily    aspirin EC  81 mg Oral Daily    atorvastatin  80 mg Oral Nightly    carvedilol  3.125 mg Oral BID WC    docusate sodium  100 mg Oral BID    dorzolamide  1 drop Left Eye BID  incubation. 03/07/2022 04:34 PM     Organism:   Lab Results   Component Value Date/Time    ORG Pseudomonas aeruginosa 09/18/2020 03:23 PM    ORG Enterococcus faecalis 09/18/2020 03:23 PM         PATRICK Vyas - CNP

## 2024-05-07 NOTE — PLAN OF CARE
Problem: Pain  Goal: Verbalizes/displays adequate comfort level or baseline comfort level  5/7/2024 1002 by Fran Hawkins RN  Outcome: Progressing  Note: Pt will be satisfied with pain control with PRN Percocet. Pt uses numeric pain rating scale with reassessments after pain med administration. Will continue to monitor progression throughout shift.  Problem: Skin/Tissue Integrity  Goal: Absence of new skin breakdown  Description: 1.  Monitor for areas of redness and/or skin breakdown  2.  Assess vascular access sites hourly  3.  Every 4-6 hours minimum:  Change oxygen saturation probe site  4.  Every 4-6 hours:  If on nasal continuous positive airway pressure, respiratory therapy assess nares and determine need for appliance change or resting period.  5/7/2024 1002 by Fran Hawkins, RN  Outcome: Progressing  Note: Pt is at risk for skin breakdown. Pt will have skin assessments every shift, encourage safe ambulation, heels elevated off of the bed, and friction and shear prevented when possible. Will continue to monitor for signs of skin breakdown and enforce prevention measures.  Problem: Safety - Adult  Goal: Free from fall injury  5/7/2024 1002 by Fran Hawkins, RN  Outcome: Progressing  Note: Pt will remain free from falls throughout hospital stay. Fall precautions in place, bed in lowest position with wheels locked and side rails 2/4 up. Room door open and hourly rounding completed. Will continue to monitor throughout shift.

## 2024-05-07 NOTE — CONSULTS
[facility]  HEART FAILURE PROGRAM      Shay Townsend 1955    History:  Past Medical History:   Diagnosis Date    Anemia     Angina     Arthritis     CAD (coronary artery disease)     CHF (congestive heart failure) (HCC)     CKD (chronic kidney disease) stage 3, GFR 30-59 ml/min (HCC)     Clostridium difficile diarrhea 3/16/12; 2/29/12    positive stool toxin    Diabetes mellitus (HCC)     Diabetic neuropathy (HCC)     feet and legs    Disease of blood and blood forming organ     Dizziness     When he moves his head/ loses his balance    GERD (gastroesophageal reflux disease)     gastric ulcer    Headache     Hearing loss     Hyperlipidemia     Hypertension     Kidney stone 2002    Refusal of blood transfusions as patient is Baptism     Sleep apnea     Tinnitus     Venous ulcer (HCC)     LLE    Wound, open 1/13/2012       ECHO:  5/3/24   EF 50-54%  HgA1C: 5/3/24   9.5  Iron Saturation:  4/24/22   15  Ferritin: 4/24/18   328.2    ACE/ARB/ARNI:   BB: Carvedilol  3.125mg BID  Spironolactone:  SGLT2:    Last Hospital Admission:  8/11/23  acute renal failure  Discharge plans: LTC [facility]    Advanced Directives: patient has advance directives scanned in the chart    Chart review completed.  Patient a 69 year old male, admitted for CHF.  Hospital day 5, currently admitted on A2.  Pt familiar to writer from previous admissions however since moving to a skilled facility LT he has remained hospital free since 8/11/23.  Pt admitted this time for CHF exacerbation.  Weight gain 10lbs this week and increased oxygen demand.  Pt currently on lasix drip.  Will return to [facility] when medically stable.  There they assist with monitoring for s/s of CHF including daily weights as well as diet and fluid restrictions.     Patient recent weights and intake/output reviewed:    Patient Vitals for the past 96 hrs (Last 3 readings):   Weight   05/07/24 0406 118.8 kg (262 lb)   05/06/24 0617 119.4

## 2024-05-07 NOTE — PROGRESS NOTES
Occupational Therapy  Facility/Department: [facility] A2 CARD TELEMETRY  Occupational Therapy Initial Assessment    Name: Shay Townsend  : 1955  MRN: [id number]  Date of Service: 2024    Discharge Recommendations:  Long Term Care with OT  OT Equipment Recommendations  Equipment Needed: No     If pt is unable to be seen after this session, please let this note serve as discharge summary.  Please see case management note for discharge disposition.  Thank you.    Patient Diagnosis(es): The primary encounter diagnosis was Acute on chronic congestive heart failure, unspecified heart failure type (HCC). Diagnoses of Acute worsening of stage 3 chronic kidney disease (HCC) and Dysphagia, unspecified type were also pertinent to this visit.  Past Medical History:  has a past medical history of Anemia, Angina, Arthritis, CAD (coronary artery disease), CHF (congestive heart failure) (HCC), CKD (chronic kidney disease) stage 3, GFR 30-59 ml/min (HCC), Clostridium difficile diarrhea, Diabetes mellitus (HCC), Diabetic neuropathy (HCC), Disease of blood and blood forming organ, Dizziness, GERD (gastroesophageal reflux disease), Headache, Hearing loss, Hyperlipidemia, Hypertension, Kidney stone, Refusal of blood transfusions as patient is Restorationism, Sleep apnea, Tinnitus, Venous ulcer (HCC), and Wound, open.  Past Surgical History:  has a past surgical history that includes hernia repair (age1); Cholecystectomy (2011); other surgical history (-16- REPAIR LOWER STERNAL INCISION, REMOVAL OF ONE STERNAL WIRE,); Upper gastrointestinal endoscopy; Cardiac surgery; other surgical history (10/10/2014); Pain management procedure (N/A, 2/10/2022); Pain management procedure (N/A, 2022); and Knee Arthrocentesis (Right, 10/6/2022).           Assessment   Performance deficits / Impairments: Decreased functional mobility ;Decreased endurance;Decreased ADL status;Decreased posture;Decreased balance;Decreased  Walker, 4 wheeled, Wheelchair-manual  Has the patient had two or more falls in the past year or any fall with injury in the past year?: No (Reports one fall in past two years, no injury)  ADL Assistance: Independent  Homemaking Assistance: Independent  Ambulation Assistance: Independent (With 4WW)  Transfer Assistance: Independent  Active : No  Occupation: Retired       Objective   Temp: 98.2 °F (36.8 °C)  Pulse: 84  Heart Rate Source: Monitor  Respirations: 16  SpO2: 94 %  O2 Device: Nasal cannula  BP: 116/60  MAP (Calculated): 79  BP Location: Right upper arm  BP Method: Automatic          Observation/Palpation  Posture: Poor  Observation: Forward flexed trunk, forward head  Safety Devices  Type of Devices: Patient at risk for falls;All fall risk precautions in place;Call light within reach;Left in bed;Bed alarm in place;Gait belt;Nurse notified  Restraints  Restraints Initially in Place: No  Bed Mobility Training  Bed Mobility Training: Yes  Supine to Sit: Modified independent (HOB flat, with BR, increased time to complete)  Balance  Sitting: Intact  Standing: Impaired (Grossly CGA to SBA with RW)  Standing - Static: Constant support;Fair  Standing - Dynamic: Constant support;Fair  Transfer Training  Transfer Training: Yes  Interventions: Safety awareness training;Verbal cues  Sit to Stand: Contact-guard assistance (EOB to RW)  Stand to Sit: Contact-guard assistance (EOB to RW)  Gait  Gait Training: Yes  Overall Level of Assistance: Contact-guard assistance;Stand-by assistance (CGA progressing to SBA)  Distance (ft): 200 Feet  Assistive Device: Walker, rolling;Gait belt  Interventions: Verbal cues  Base of Support: Widened  Speed/Chrissy: Slow;Shuffled;Pace decreased (< 100 feet/min)  Step Length: Left shortened;Right shortened  Stance: Left increased;Right increased;Time  Gait Abnormalities: Decreased step clearance (slow reciprocal pattern, B decreased toe clearance, forward flexed trunk and forward head

## 2024-05-07 NOTE — CARE COORDINATION
Spoke with RN who states patient is till being diuresed.  Patient is a long term resident at [facility] and there are no barriers to him returning.  Will need ambulance transport at discharge.

## 2024-05-07 NOTE — PROGRESS NOTES
is low normal with a visually estimated ejection fraction of 50-55%. The left ventricle is normal in size with mild concentric hypertrophy. The interventricular septum during systole and diastole is flattened. This is consistent with right ventricular pressure and volume overload. Grade III diastolic dysfunction with elevated LV pressure. There is no evidence of a left ventricular thrombus. The right ventricle is mildly dilated. Right ventricular systolic function is reduced. The left atrium appears severely enlarged. The right atrium is mildly enlarged. The aortic valve appears functionally bicuspid with severely calcified and fused left and non-coronary cusps. Moderate aortic stenosis and regurgitation. Mild mitral, pulmonic, and tricuspid regurgitation. Systolic pulmonary artery pressure (SPAP) is elevated and estimated at 81 mmHg (right atrial pressure 15 mmHg) consistent with severe pulmonary hypertension. The IVC is dilated in size (>2.1 cm) and collapses <50% with respiration consistent with markedly elevated right atrial pressures (15      3/8/22 Lexiscan-Myoview:  Summary   Abnormal moderate risk myocardial perfusion study.   There is a large area of mixed ischemia and scar within the infero-apical,   apical-lateral, lateral, posterior-lateral, and posterior-basal segments.   The left ventricular size is moderately dilated.   The estimated left ventricular function is 67%.    Telemetry independently reviewed and interpreted by me today and shows: atrial fibrillation    05/07/24 0406 118.8 kg (262 lb) Actual;Standing scale      05/06/24 0617 119.4 kg (263 lb 3.2 oz) Standing scale     05/05/24 0448 118.8 kg (261 lb 12.8 oz) Standing scale     05/04/24 0458 118.5 kg (261 lb 3.2 oz) Standing scale     05/03/24 0451 119.1 kg (262 lb 8 oz) Standing scale     05/02/24 1415 123.8 kg (273 lb) --       Principal Problem:    CHF NYHA class I, acute, diastolic (HCC)  Active Problems:    S/P CABG (coronary artery  bypass graft)  Resolved Problems:    * No resolved hospital problems. *      Assessment:  Acute on chronic biventricular Heart failure  Aortic stenosis  CAD s/p CABG  PAF (declines anticoagulation)   CKD  Anemia  Thrombocytopenia      Plan:  Daily weights, Daily BMP, Strict I/O's  Continue lasix infusion 15mg/hr  Continue aspirin and statin  Continue coreg   Already on midodrine 2.5mg BID      Education provided today Disease process and medications discussed. Questions answered fully.  Time spent educating today 10 minutes    PATRICK Hargrove - CNP,  5/7/2024, 12:16 PM

## 2024-05-07 NOTE — PLAN OF CARE
Problem: Chronic Conditions and Co-morbidities  Goal: Patient's chronic conditions and co-morbidity symptoms are monitored and maintained or improved  5/7/2024 1002 by Fran Hawkins RN  Outcome: Progressing  Note:   CHF Care Plan      Patient's EF (Ejection Fraction) is greater than 40%    Heart Failure Medications:  Diuretics:: Furosemide    (One of the following REQUIRED for EF </= 40%/SYSTOLIC FAILURE but MAY be used in EF% >40%/DIASTOLIC FAILURE)        ACE:: None        ARB:: None         ARNI:: None    (Beta Blockers)  NON- Evidenced Based Beta Blocker (for EF% >40%/DIASTOLIC FAILURE): None    Evidenced Based Beta Blocker::(REQUIRED for EF% <40%/SYSTOLIC FAILURE) None  ...................................................................................................................................................    Failed to redirect to the Timeline version of the [employer] SmartLink.      Patient's weights and intake/output reviewed    Daily Weight log at bedside, patient/family participation in use of log: \"yes    Patient's current weight today shows a difference of 1 lbs less than last documented weight.      Intake/Output Summary (Last 24 hours) at 5/7/2024 1002  Last data filed at 5/7/2024 0840  Gross per 24 hour   Intake 1220 ml   Output 3525 ml   Net -2305 ml       Education Booklet Provided: yes    Comorbidities Reviewed Yes    Patient has a past medical history of Anemia, Angina, Arthritis, CAD (coronary artery disease), CHF (congestive heart failure) ([facility]), CKD (chronic kidney disease) stage 3, GFR 30-59 ml/min ([facility]), Clostridium difficile diarrhea, Diabetes mellitus ([facility]), Diabetic neuropathy ([facility]), Disease of blood and blood forming organ, Dizziness, GERD (gastroesophageal reflux disease), Headache, Hearing loss, Hyperlipidemia, Hypertension, Kidney stone, Refusal of blood transfusions as patient is Congregational, Sleep apnea, Tinnitus, Venous ulcer (HCC), and Wound, open.     >>For CHF  and Comorbidity documentation on Education Time and Topics, please see Education Tab      Pt resting in bed at this time on  1 L O2. Pt denies shortness of breath. Pt with pitting lower extremity edema.     Patient and/or Family's stated Goal of Care this Admission: reduce shortness of breath, increase activity tolerance, better understand heart failure and disease management, be more comfortable, and reduce lower extremity edema prior to discharge        :  Pt will have accuchecks before meals and at bedtime with sliding scale insulin in place for coverage. Will continue to monitor for signs and symptoms of hypoglycemia and hyperglycemia throughout shift.

## 2024-05-07 NOTE — PROGRESS NOTES
[facility] Progress Note           Subjective/   69 y.o. year old male who we are seeing in consultation for CKD stage 3b.     HPI:  Renal function worsening with diuresis, non-oliguric.  States shortness of breath better, on 1 L NC, continues to remain edematous.    ROS:  Intake adequate, +weak.    Objective/   GEN:  Chronically ill, /63   Pulse 85   Temp 97.9 °F (36.6 °C) (Oral)   Resp 18   Ht 1.727 m (5' 8\")   Wt 118.8 kg (262 lb)   SpO2 92%   BMI 39.84 kg/m²   HEENT: non-icteric, no JVD  CV: S1, S2 without m/r/g; +++ LE edema  RESP: CTA B without w/r/r; breathing wnl  ABD: +bs, soft, nt, no hsm  SKIN: warm, no rashes    Data/  Recent Labs     05/05/24 0452 05/06/24  0441 05/07/24  0441   WBC 6.1 5.6 5.3   HGB 13.3* 13.0* 13.1*   HCT 41.0 39.9* 40.5   MCV 85.4 85.8 85.4   * 110* 105*       Recent Labs     05/05/24 0452 05/06/24 0441 05/07/24  0441   * 134* 132*   K 4.0 3.8 3.4*   CL 91* 90* 87*   CO2 35* 34* 35*   GLUCOSE 253* 231* 241*   MG 2.40 2.50* 2.20   * 106* 109*   CREATININE 1.6* 2.0* 2.0*   LABGLOM 46* 35* 35*         Assessment/     - Acute on Chronic kidney disease stage 3b - Cardiorenal syndrome   SCr 1.5-1.7    - CHF    - Hyponatremia    - Hypertension      Plan/     - Lasix drip 15 mg/hour, prn diamox today   - Trend labs, bp's, weights, & urine output    ____________________________________  Ben Bryant MD  [facility]  [email or web address]  Office: [phone]

## 2024-05-07 NOTE — PROGRESS NOTES
Observation/Palpation  Posture: Poor  Observation: Forward flexed trunk, forward head  Gross Assessment  AROM: Within functional limits  PROM: Within functional limits  Strength: Generally decreased, functional  Sensation: Impaired                 Bed Mobility Training  Bed Mobility Training: Yes  Supine to Sit: Modified independent (HOB flat, with BR, increased time to complete)  Balance  Sitting: Intact  Standing: Impaired (Grossly CGA to SBA with RW)  Standing - Static: Constant support;Fair  Standing - Dynamic: Constant support;Fair  Transfer Training  Transfer Training: Yes  Interventions: Safety awareness training;Verbal cues  Sit to Stand: Contact-guard assistance (EOB to RW)  Stand to Sit: Contact-guard assistance (EOB to RW)  Gait  Gait Training: Yes  Overall Level of Assistance: Contact-guard assistance;Stand-by assistance (CGA progressing to SBA)  Distance (ft): 200 Feet  Assistive Device: Walker, rolling;Gait belt  Interventions: Verbal cues  Base of Support: Widened  Speed/Chrissy: Slow;Shuffled;Pace decreased (< 100 feet/min)  Step Length: Left shortened;Right shortened  Stance: Left increased;Right increased;Time  Gait Abnormalities: Decreased step clearance (slow reciprocal pattern, B decreased toe clearance, forward flexed trunk and forward head with cues for forward gaze. Overall steady with no LOB)                            AM-PAC - Mobility    AM-PAC Basic Mobility - Inpatient   How much help is needed turning from your back to your side while in a flat bed without using bedrails?: None  How much help is needed moving from lying on your back to sitting on the side of a flat bed without using bedrails?: None  How much help is needed moving to and from a bed to a chair?: A Little  How much help is needed standing up from a chair using your arms?: A Little  How much help is needed walking in hospital room?: A Little  How much help is needed climbing 3-5 steps with a railing?: A Lot  AM-PAC  Inpatient Mobility Raw Score : 19  AM-PAC Inpatient T-Scale Score : 45.44  Mobility Inpatient CMS 0-100% Score: 41.77  Mobility Inpatient CMS G-Code Modifier : CK           Goals  Short Term Goals  Time Frame for Short Term Goals: 1 week 5/14/24 (unless otherwise specified)  Short Term Goal 1: Pt will complete functional t/f with LRAD with Ayden  Short Term Goal 2: Pt will ambulate >150' with RW with Ayden without LOB  Short Term Goal 3: 5/12/25: Pt will participate in 12-15 reps BLE TE to improve strength and increase safety and IND with mobility and gait  Short Term Goal 4: Pt will meet 3/5 CHF goals  Patient Goals   Patient Goals : \"Be able to walk\"       Education  Patient Education  Education Given To: Patient  Education Provided: Role of Therapy;Plan of Care;Precautions;Transfer Training  Education Provided Comments: Educated on role of PT, safe progression of mobility, daily weights for CHF  Education Method: Demonstration;Verbal  Barriers to Learning: Vision  Education Outcome: Verbalized understanding      Therapy Time   Individual Concurrent Group Co-treatment   Time In 1420         Time Out 1455         Minutes 35         Timed Code Treatment Minutes: 25 Minutes (10 minutes for eval)       If pt is unable to be seen after this session, please let this note serve as discharge summary.  Please see case management note for discharge disposition.  Thank you.    Laura Treadwell, PT, DPT

## 2024-05-08 LAB
ALBUMIN SERPL-MCNC: 4.1 G/DL (ref 3.4–5)
ANION GAP SERPL CALCULATED.3IONS-SCNC: 10 MMOL/L (ref 3–16)
BASOPHILS # BLD: 0 K/UL (ref 0–0.2)
BASOPHILS NFR BLD: 0.6 %
BUN SERPL-MCNC: 103 MG/DL (ref 7–20)
CALCIUM SERPL-MCNC: 9.6 MG/DL (ref 8.3–10.6)
CHLORIDE SERPL-SCNC: 88 MMOL/L (ref 99–110)
CO2 SERPL-SCNC: 37 MMOL/L (ref 21–32)
CREAT SERPL-MCNC: 1.7 MG/DL (ref 0.8–1.3)
DEPRECATED RDW RBC AUTO: 19.2 % (ref 12.4–15.4)
EOSINOPHIL # BLD: 0.2 K/UL (ref 0–0.6)
EOSINOPHIL NFR BLD: 3.6 %
GFR SERPLBLD CREATININE-BSD FMLA CKD-EPI: 43 ML/MIN/{1.73_M2}
GLUCOSE BLD-MCNC: 172 MG/DL (ref 70–99)
GLUCOSE BLD-MCNC: 210 MG/DL (ref 70–99)
GLUCOSE BLD-MCNC: 240 MG/DL (ref 70–99)
GLUCOSE BLD-MCNC: 243 MG/DL (ref 70–99)
GLUCOSE SERPL-MCNC: 216 MG/DL (ref 70–99)
HCT VFR BLD AUTO: 41.5 % (ref 40.5–52.5)
HGB BLD-MCNC: 13.5 G/DL (ref 13.5–17.5)
LYMPHOCYTES # BLD: 0.6 K/UL (ref 1–5.1)
LYMPHOCYTES NFR BLD: 11.1 %
MCH RBC QN AUTO: 27.9 PG (ref 26–34)
MCHC RBC AUTO-ENTMCNC: 32.6 G/DL (ref 31–36)
MCV RBC AUTO: 85.5 FL (ref 80–100)
MONOCYTES # BLD: 0.6 K/UL (ref 0–1.3)
MONOCYTES NFR BLD: 11 %
NEUTROPHILS # BLD: 4.3 K/UL (ref 1.7–7.7)
NEUTROPHILS NFR BLD: 73.7 %
PERFORMED ON: ABNORMAL
PHOSPHATE SERPL-MCNC: 4.1 MG/DL (ref 2.5–4.9)
PLATELET # BLD AUTO: 118 K/UL (ref 135–450)
PMV BLD AUTO: 9.3 FL (ref 5–10.5)
POTASSIUM SERPL-SCNC: 4 MMOL/L (ref 3.5–5.1)
RBC # BLD AUTO: 4.85 M/UL (ref 4.2–5.9)
SODIUM SERPL-SCNC: 135 MMOL/L (ref 136–145)
WBC # BLD AUTO: 5.8 K/UL (ref 4–11)

## 2024-05-08 PROCEDURE — 94761 N-INVAS EAR/PLS OXIMETRY MLT: CPT

## 2024-05-08 PROCEDURE — 36415 COLL VENOUS BLD VENIPUNCTURE: CPT

## 2024-05-08 PROCEDURE — 6370000000 HC RX 637 (ALT 250 FOR IP): Performed by: INTERNAL MEDICINE

## 2024-05-08 PROCEDURE — 85025 COMPLETE CBC W/AUTO DIFF WBC: CPT

## 2024-05-08 PROCEDURE — 6360000002 HC RX W HCPCS: Performed by: NURSE PRACTITIONER

## 2024-05-08 PROCEDURE — 80069 RENAL FUNCTION PANEL: CPT

## 2024-05-08 PROCEDURE — 6360000002 HC RX W HCPCS: Performed by: INTERNAL MEDICINE

## 2024-05-08 PROCEDURE — 1200000000 HC SEMI PRIVATE

## 2024-05-08 PROCEDURE — 6370000000 HC RX 637 (ALT 250 FOR IP): Performed by: NURSE PRACTITIONER

## 2024-05-08 PROCEDURE — 2580000003 HC RX 258: Performed by: INTERNAL MEDICINE

## 2024-05-08 PROCEDURE — 97530 THERAPEUTIC ACTIVITIES: CPT

## 2024-05-08 PROCEDURE — 97110 THERAPEUTIC EXERCISES: CPT

## 2024-05-08 PROCEDURE — 99232 SBSQ HOSP IP/OBS MODERATE 35: CPT | Performed by: NURSE PRACTITIONER

## 2024-05-08 PROCEDURE — 97116 GAIT TRAINING THERAPY: CPT

## 2024-05-08 PROCEDURE — 2700000000 HC OXYGEN THERAPY PER DAY

## 2024-05-08 PROCEDURE — 2580000003 HC RX 258: Performed by: NURSE PRACTITIONER

## 2024-05-08 RX ORDER — INSULIN GLARGINE 100 [IU]/ML
45 INJECTION, SOLUTION SUBCUTANEOUS NIGHTLY
Status: DISCONTINUED | OUTPATIENT
Start: 2024-05-08 | End: 2024-05-10 | Stop reason: HOSPADM

## 2024-05-08 RX ADMIN — PREGABALIN 50 MG: 25 CAPSULE ORAL at 09:58

## 2024-05-08 RX ADMIN — MIDODRINE HYDROCHLORIDE 5 MG: 5 TABLET ORAL at 09:58

## 2024-05-08 RX ADMIN — POLYETHYLENE GLYCOL 3350 17 G: 17 POWDER, FOR SOLUTION ORAL at 10:02

## 2024-05-08 RX ADMIN — TIMOLOL MALEATE 1 DROP: 5 SOLUTION OPHTHALMIC at 20:43

## 2024-05-08 RX ADMIN — TIMOLOL MALEATE 1 DROP: 5 SOLUTION OPHTHALMIC at 10:00

## 2024-05-08 RX ADMIN — FUROSEMIDE 15 MG/HR: 10 INJECTION, SOLUTION INTRAMUSCULAR; INTRAVENOUS at 01:19

## 2024-05-08 RX ADMIN — MIDODRINE HYDROCHLORIDE 5 MG: 5 TABLET ORAL at 17:29

## 2024-05-08 RX ADMIN — INSULIN GLARGINE 45 UNITS: 100 INJECTION, SOLUTION SUBCUTANEOUS at 20:40

## 2024-05-08 RX ADMIN — OXYCODONE AND ACETAMINOPHEN 1 TABLET: 5; 325 TABLET ORAL at 00:13

## 2024-05-08 RX ADMIN — INSULIN LISPRO 1 UNITS: 100 INJECTION, SOLUTION INTRAVENOUS; SUBCUTANEOUS at 17:29

## 2024-05-08 RX ADMIN — DORZOLAMIDE HYDROCHLORIDE 1 DROP: 20 SOLUTION/ DROPS OPHTHALMIC at 20:43

## 2024-05-08 RX ADMIN — DORZOLAMIDE HYDROCHLORIDE 1 DROP: 20 SOLUTION/ DROPS OPHTHALMIC at 10:00

## 2024-05-08 RX ADMIN — PREGABALIN 50 MG: 25 CAPSULE ORAL at 20:44

## 2024-05-08 RX ADMIN — BRIMONIDINE TARTRATE 1 DROP: 2 SOLUTION OPHTHALMIC at 20:43

## 2024-05-08 RX ADMIN — Medication 10 ML: at 20:44

## 2024-05-08 RX ADMIN — POLYETHYLENE GLYCOL 3350 17 G: 17 POWDER, FOR SOLUTION ORAL at 20:44

## 2024-05-08 RX ADMIN — HEPARIN SODIUM 5000 UNITS: 5000 INJECTION INTRAVENOUS; SUBCUTANEOUS at 05:53

## 2024-05-08 RX ADMIN — ASPIRIN 81 MG: 81 TABLET, COATED ORAL at 09:58

## 2024-05-08 RX ADMIN — ATORVASTATIN CALCIUM 80 MG: 80 TABLET, FILM COATED ORAL at 20:43

## 2024-05-08 RX ADMIN — TAMSULOSIN HYDROCHLORIDE 0.8 MG: 0.4 CAPSULE ORAL at 09:58

## 2024-05-08 RX ADMIN — DOCUSATE SODIUM 100 MG: 100 CAPSULE, LIQUID FILLED ORAL at 20:43

## 2024-05-08 RX ADMIN — HEPARIN SODIUM 5000 UNITS: 5000 INJECTION INTRAVENOUS; SUBCUTANEOUS at 20:42

## 2024-05-08 RX ADMIN — PANTOPRAZOLE SODIUM 40 MG: 40 TABLET, DELAYED RELEASE ORAL at 09:58

## 2024-05-08 RX ADMIN — HEPARIN SODIUM 5000 UNITS: 5000 INJECTION INTRAVENOUS; SUBCUTANEOUS at 14:18

## 2024-05-08 RX ADMIN — BRIMONIDINE TARTRATE 1 DROP: 2 SOLUTION OPHTHALMIC at 10:00

## 2024-05-08 RX ADMIN — OXYCODONE AND ACETAMINOPHEN 1 TABLET: 5; 325 TABLET ORAL at 10:23

## 2024-05-08 RX ADMIN — Medication 10 ML: at 10:01

## 2024-05-08 RX ADMIN — ALLOPURINOL 300 MG: 300 TABLET ORAL at 09:58

## 2024-05-08 RX ADMIN — DOCUSATE SODIUM 100 MG: 100 CAPSULE, LIQUID FILLED ORAL at 09:58

## 2024-05-08 ASSESSMENT — PAIN SCALES - GENERAL
PAINLEVEL_OUTOF10: 8
PAINLEVEL_OUTOF10: 6

## 2024-05-08 ASSESSMENT — PAIN DESCRIPTION - ORIENTATION
ORIENTATION: MID;POSTERIOR;RIGHT;LEFT
ORIENTATION: MID;POSTERIOR;RIGHT;LEFT

## 2024-05-08 ASSESSMENT — PAIN DESCRIPTION - DESCRIPTORS: DESCRIPTORS: SPASM

## 2024-05-08 ASSESSMENT — PAIN DESCRIPTION - LOCATION
LOCATION: BACK;NECK;FOOT
LOCATION: NECK;BACK;FOOT

## 2024-05-08 NOTE — PLAN OF CARE
CHF Care Plan      Patient's EF (Ejection Fraction) is greater than 40%    Heart Failure Medications:  Diuretics:: Furosemide    (One of the following REQUIRED for EF </= 40%/SYSTOLIC FAILURE but MAY be used in EF% >40%/DIASTOLIC FAILURE)        ACE:: None        ARB:: None         ARNI:: None    (Beta Blockers)  NON- Evidenced Based Beta Blocker (for EF% >40%/DIASTOLIC FAILURE): None    Evidenced Based Beta Blocker::(REQUIRED for EF% <40%/SYSTOLIC FAILURE) Carvedilol- Coreg  ...................................................................................................................................................    Failed to redirect to the Timeline version of the [employer] SmartLink.      Patient's weights and intake/output reviewed    Daily Weight log at bedside, patient/family participation in use of log: \"yes    Patient's current weight today shows a difference of 4 lbs less than last documented weight.      Intake/Output Summary (Last 24 hours) at 5/8/2024 1056  Last data filed at 5/8/2024 0938  Gross per 24 hour   Intake 1356.63 ml   Output 5950 ml   Net -4593.37 ml       Education Booklet Provided: yes    Comorbidities Reviewed Yes    Patient has a past medical history of Anemia, Angina, Arthritis, CAD (coronary artery disease), CHF (congestive heart failure) (HCC), CKD (chronic kidney disease) stage 3, GFR 30-59 ml/min (HCC), Clostridium difficile diarrhea, Diabetes mellitus (HCC), Diabetic neuropathy (HCC), Disease of blood and blood forming organ, Dizziness, GERD (gastroesophageal reflux disease), Headache, Hearing loss, Hyperlipidemia, Hypertension, Kidney stone, Refusal of blood transfusions as patient is [employer], Sleep apnea, Tinnitus, Venous ulcer (HCC), and Wound, open.     >>For CHF and Comorbidity documentation on Education Time and Topics, please see Education Tab      Pt sitting in bed at this time on  2 L O2. Pt with complaints of shortness of breath. Pt with pitting lower

## 2024-05-08 NOTE — PLAN OF CARE
Problem: Discharge Planning  Goal: Discharge to home or other facility with appropriate resources  5/8/2024 1026 by Candace Stein, RN  Outcome: Progressing     Problem: Pain  Goal: Verbalizes/displays adequate comfort level or baseline comfort level  5/8/2024 1026 by Candace Stein, RN  Outcome: Progressing     Problem: Safety - Adult  Goal: Free from fall injury  5/8/2024 1026 by Candace Stein, RN  Outcome: Progressing

## 2024-05-08 NOTE — PLAN OF CARE
Problem: Discharge Planning  Goal: Discharge to home or other facility with appropriate resources  Outcome: Progressing     Problem: Pain  Goal: Verbalizes/displays adequate comfort level or baseline comfort level  Outcome: Progressing     Problem: Skin/Tissue Integrity  Goal: Absence of new skin breakdown  Description: 1.  Monitor for areas of redness and/or skin breakdown  2.  Assess vascular access sites hourly  3.  Every 4-6 hours minimum:  Change oxygen saturation probe site  4.  Every 4-6 hours:  If on nasal continuous positive airway pressure, respiratory therapy assess nares and determine need for appliance change or resting period.  Outcome: Progressing     Problem: Safety - Adult  Goal: Free from fall injury  Outcome: Progressing     Problem: Chronic Conditions and Co-morbidities  Goal: Patient's chronic conditions and co-morbidity symptoms are monitored and maintained or improved  Outcome: Progressing   CHF Care Plan      Patient's EF (Ejection Fraction) is greater than 40%    Heart Failure Medications:  Diuretics:: Furosemide    (One of the following REQUIRED for EF </= 40%/SYSTOLIC FAILURE but MAY be used in EF% >40%/DIASTOLIC FAILURE)        ACE:: None        ARB:: None         ARNI:: None    (Beta Blockers)  NON- Evidenced Based Beta Blocker (for EF% >40%/DIASTOLIC FAILURE): None    Evidenced Based Beta Blocker::(REQUIRED for EF% <40%/SYSTOLIC FAILURE) Carvedilol- Coreg  ...................................................................................................................................................    Failed to redirect to the Timeline version of the [employer] SmartLink.      Patient's weights and intake/output reviewed    Daily Weight log at bedside, patient/family participation in use of log: \"yes    Patient's current weight today shows a difference of 9 lbs less than last documented weight.      Intake/Output Summary (Last 24 hours) at 5/8/2024 3015  Last data filed at 5/8/2024

## 2024-05-08 NOTE — PROGRESS NOTES
Physician Progress Note      PATIENT:               RICHAR BUSTAMANTE  [facility] #:                  [id number]  :                       1955  ADMIT DATE:       2024 1:24 PM  DISCH DATE:  RESPONDING  PROVIDER #:        JORGE CONRAD          QUERY TEXT:    Patient admitted with CHF.  Noted documentation of acute hypoxic respiratory   failure per H/P and hypoxia on subsequent attending notes.  If possible, please document in progress notes and discharge summary if you   are evaluating and /or treating any of the following:    The medical record reflects the following:  Risk Factors: CHD, obesity, Pulm HTN, CKD, DM2, LD  Clinical Indicators: Per H/P - \"Acute hypoxic respiratory failure secondary   to above-titrate the oxygen...Respiratory:  Normal respiratory effort.    bilaterally with Rales/ no Wheezes/Rhonchi.\"  Per IM note 5/3-\"Hypoxia in setting of above\"  Per VS flow sheet -RR 23, placed on 3L NC maintaining O2 sat 93-99%  Treatment: O2, labs, diuretics, cardiology consult, CHF nurse consult    Thank you,  Holly Ryan RN BSN CRCR CCDS  [email or web address]  Options provided:  -- Acute hypoxic respiratory failure confirmed, please include additional   supporting evidence, please include additional supporting evidence.  -- Hypoxia only, acute respiratory failure ruled out  -- Other - I will add my own diagnosis  -- Disagree - Not applicable / Not valid  -- Disagree - Clinically unable to determine / Unknown  -- Refer to Clinical Documentation Reviewer    PROVIDER RESPONSE TEXT:    Hypoxia only, acute respiratory failure ruled out    Query created by: Kindra Ryan on 2024 2:04 PM      QUERY TEXT:    Pt admitted with CHF. Pt noted to also have HTN, CAD, aortic stenosis. If   possible, please document in progress notes and discharge summary the etiology   of CHF, if able to be determined.    The medical record reflects the following:  Risk Factors: CAD, HTN, aortic stenosis, CKD, DM2,  Clinical

## 2024-05-08 NOTE — PROGRESS NOTES
[facility] Progress Note           Subjective/   69 y.o. year old male who we are seeing in consultation for CKD stage 3b.     HPI:  Renal function stable with diuresis, non-oliguric.  States shortness of breath better, on 1-2 L NC, edema better.    ROS:  Intake adequate, +weak.    Objective/   GEN:  Chronically ill, /61   Pulse 80   Temp 98.4 °F (36.9 °C) (Oral)   Resp 18   Ht 1.727 m (5' 8\")   Wt 114.8 kg (253 lb)   SpO2 97%   BMI 38.47 kg/m²   HEENT: non-icteric, no JVD  CV: S1, S2 without m/r/g; ++ LE edema  RESP: CTA B without w/r/r; breathing wnl  ABD: +bs, soft, nt, no hsm  SKIN: warm, no rashes    Data/  Recent Labs     05/06/24 0441 05/07/24  0441 05/08/24  0436   WBC 5.6 5.3 5.8   HGB 13.0* 13.1* 13.5   HCT 39.9* 40.5 41.5   MCV 85.8 85.4 85.5   * 105* 118*       Recent Labs     05/06/24 0441 05/07/24  0441 05/08/24  0436   * 132* 135*   K 3.8 3.4* 4.0   CL 90* 87* 88*   CO2 34* 35* 37*   GLUCOSE 231* 241* 216*   PHOS  --   --  4.1   MG 2.50* 2.20  --    * 109* 103*   CREATININE 2.0* 2.0* 1.7*   LABGLOM 35* 35* 43*         Assessment/     - Acute on Chronic kidney disease stage 3b - Cardiorenal syndrome   SCr 1.5-1.7    - CHF    - Hyponatremia    - Hypertension      Plan/     - Trial off diuresis given adequate response over last 24 hours, transition to torsemide in AM   - Continue midodrine for BP support  - Trend labs, bp's, weights, & urine output    ____________________________________  Ben Bryant MD  [facility]  [email or web address]  Office: [phone]

## 2024-05-08 NOTE — PROGRESS NOTES
Hospital Medicine Progress Note      Date of Admission: 5/2/2024  Hospital Day: 7    Chief Admission Complaint:    Chief Complaint   Patient presents with    Shortness of Breath     10 lb weight gain past week, placed on 3L this past week, usually does not wear oxygen, increasing sob, resides at [facility],      Subjective:    Weight - down 20 lb/ -13L    Patient is doing well today. He is encouraged by the weight and fluid loss since yesterday. On 1L/NC and plan to wean O2 off today.    Presenting Admission History:       This is a 69 y.o. male who presented to [facility] with  above c/o ..  PMHx significant for CKD diastolic CHF CAD CABG diabetes hypertension and hyperlipidemia.  He is a long-term care resident and started with shortness of breath few days ago  Is worsened and currently he needs oxygen  He was sent to the emergency room for further evaluation  He is on 3 L oxygen and comfortable now  He denies chest pain cough sputum fever change in mental status nausea vomiting diarrhea or any urinary complaints.  He feels his stomach is more and more distended   He has chronic venous stasis with edema and erythematous changes over lower extremities  He is a Jehovah witness     Assessment/Plan:       Current Principal Problem:  CHF NYHA class I, acute, diastolic (HCC)     Acute on chronic diastolic congestive heart failure, appears decompensated DCHF mostly likely secondary to HTN heart disease.  - TTE on 08/26/22 with LV systolic function low normal with EF 50-55%. Grade 3 DD. Right ventricular systolic function is reduced. Aortic valve appears functionally bicuspid with severely calcified and fused left and non-coronary cusps. Moderate aortic stenosis and regurgitation.  - Repeat Echo on 05/03/24 with LVEF 50-54% with indeterminate relaxation. Septal flattening is noted consistent with RV volume/pressure overload. RV poorly visualized, but appears moderately dilated with reduced systolic function.  Nephropathy  [] IV Narcotic analgesia for ADR   [x] Aggressive IV diuresis requiring serial monitoring for renal impairment and electrolyte derangements  [] Hypertonic Saline requiring serial renal monitoring for appropriate electrolyte correction rate   [] Critical electrolyte abnormalities requiring IV replacement and close serial monitoring  [] Insulin - monitoring FSBS for Hypoglycemic ADR  [] Anticoagulation requiring close serial monitoring and dose adjustments at high risk of ADR   [] HD requiring close serial monitoring of electrolytes and fluid status  [] Other -  [] Change in code status:    [] Decision to escalate care:    [] Major surgery/procedure with associated risk factors:    ----------------------------------------------------------------------  C. Data (any 2)  [x] Discussed management of the case with consultants as follows:  Nephrology, Dr. Bryant  [x] Discussed the discharge plan in detail with case mgt including timing/barriers to discharge, need for support services and placement decision   [] Imaging personally reviewed and interpreted, includes:   [] Telemetry monitoring as noted above  [x] Data Review (any 3)  [x] Collateral history obtained from:  pt, review of emr  [x] All available Consultant notes from yesterday/today were reviewed  [x] All current labs were reviewed and interpreted for clinical significance   [x] Appropriate follow-up labs were ordered    Medications:  Personally reviewed in detail in conjunction w/ labs as documented for evidence of drug toxicity.     Infusion Medications    furosemide (LASIX) 100 mg in sodium chloride 0.9 % 100 mL infusion 15 mg/hr (05/08/24 0119)    sodium chloride      dextrose       Scheduled Medications    sodium phosphate  1 enema Rectal Once    polyethylene glycol  17 g Oral BID    midodrine  5 mg Oral BID WC    allopurinol  300 mg Oral Daily    aspirin EC  81 mg Oral Daily    atorvastatin  80 mg Oral Nightly    carvedilol  3.125 mg Oral BID

## 2024-05-08 NOTE — PROGRESS NOTES
[facility]   Heart Failure Daily Progress Note    Admit Date:  5/2/2024  HPI:    Chief Complaint   Patient presents with    Shortness of Breath     10 lb weight gain past week, placed on 3L this past week, usually does not wear oxygen, increasing sob, resides at [facility],         Shay Townsend is being followed for shortness of breath. Known patient to me from CHF clinic. Resides at [facility].   Hx of CAD s/p CABG and CKD.     Interval history: lasix infusion increased to 15mg/hr on 5/6/24.   Diamox given 5/7/2024; over 6L UOP last 24 hours, with weight down 9lbs overnight.    Lasix infusion stopped 5/8/2024    Subjective:  Mr. Townsend wants to continue diuresis. Felt like the IV meds yesterday really helped.     Objective:   /61   Pulse 81   Temp 98.4 °F (36.9 °C) (Oral)   Resp 18   Ht 1.727 m (5' 8\")   Wt 114.8 kg (253 lb)   SpO2 98%   BMI 38.47 kg/m²     Intake/Output Summary (Last 24 hours) at 5/8/2024 0723  Last data filed at 5/8/2024 0505  Gross per 24 hour   Intake 1716.63 ml   Output 6200 ml   Net -4483.37 ml       NYHA: IV    Physical Exam:  General:  Awake, alert, NAD, able to sit up on the side of the bed.   Skin:  Warm and dry  Chest:  no  in the bases to auscultation, no wheezes/rhonchi  Cardiovascular:  irregular, reg S1S2, no m/r/g   Abdomen:  Soft, nontender, +bowel sounds  Extremities:  ++ bilateral lower extremity edema    Medications:    sodium phosphate  1 enema Rectal Once    polyethylene glycol  17 g Oral BID    midodrine  5 mg Oral BID WC    allopurinol  300 mg Oral Daily    aspirin EC  81 mg Oral Daily    atorvastatin  80 mg Oral Nightly    carvedilol  3.125 mg Oral BID WC    docusate sodium  100 mg Oral BID    dorzolamide  1 drop Left Eye BID    insulin glargine  40 Units SubCUTAneous Nightly    linaclotide  290 mcg Oral QAM AC    pantoprazole  40 mg Oral Daily    pregabalin  50 mg Oral BID    Netarsudil Dimesylate  1 drop Left Eye Nightly    tamsulosin  Standing scale     05/02/24 1415 123.8 kg (273 lb) --       Principal Problem:    CHF NYHA class I, acute, diastolic (HCC)  Active Problems:    S/P CABG (coronary artery bypass graft)  Resolved Problems:    * No resolved hospital problems. *      Assessment:  Acute on chronic biventricular Heart failure  Aortic stenosis  CAD s/p CABG  PAF (declines anticoagulation)   CKD  Anemia  Thrombocytopenia      Plan:  Daily weights, Daily BMP, Strict I/O's  Continue aspirin and statin  Continue coreg   Already on midodrine 2.5mg BID  Torsemide per nephrology - appreciate assistance     Discussed with nursing       Education provided today Disease process and medications discussed. Questions answered fully.  Time spent educating today 10 minutes    PATRICK Hargrove - CNP,  5/8/2024, 3:56 PM

## 2024-05-08 NOTE — PROGRESS NOTES
Physical Therapy  Facility/Department: [facility] A2 CARD TELEMETRY  Daily Treatment Note  NAME: Shay Townsend  : 1955  MRN: [id number]    Date of Service: 2024    Discharge Recommendations:  Long Term Care with PT   PT Equipment Recommendations  Equipment Needed: No    Patient Diagnosis(es): The primary encounter diagnosis was Acute on chronic congestive heart failure, unspecified heart failure type (HCC). Diagnoses of Acute worsening of stage 3 chronic kidney disease (HCC) and Dysphagia, unspecified type were also pertinent to this visit.    Assessment   Assessment: Patient seen for gait and LE strengthening.  Patient cleared by RN for therapy participation this date.  Patient agreeable to therapy. Patient completed mobility with supervision and RW. Pt tolerates up to 200 ft of ambulation, VSS. Pt completes seated exercises at EOB.  P.T .will continue to follow throughout LOS.    Recommending return to ECF with PT.  Activity Tolerance: Patient tolerated evaluation without incident  Equipment Needed: No     Plan    Physical Therapy Plan  General Plan: 3-5 times per week  Specific Instructions for Next Treatment: Progress mobility as tolerated  Current Treatment Recommendations: Strengthening;ROM;Balance training;Gait training;Home exercise program;Functional mobility training;Stair training;Transfer training;Neuromuscular re-education;Safety education & training;Therapeutic activities;Patient/Caregiver education & training;Equipment evaluation, education, & procurement;Endurance training;Positioning     Restrictions  Restrictions/Precautions  Restrictions/Precautions: Fall Risk, General Precautions  Position Activity Restriction  Other position/activity restrictions: O2 NC, purewick, IV     Subjective    Subjective  Subjective: pt in bed, agreeable to therapy  Pain: Pt reporting chronic pain 8/10, generalized  Orientation  Overall Orientation Status: Within Normal Limits  Cognition  Overall Cognitive  Status: WFL     Objective   Vitals  Vitals:    05/08/24    BP: (!) 112/95   Pulse: 84   Resp:    Temp:    SpO2: 97%          Bed Mobility Training  Bed Mobility Training: No  Supine to Sit:  (sitting EOB on arrival)  Balance  Sitting: Intact  Standing: Impaired  Standing - Static: Constant support;Fair (RW)  Standing - Dynamic: Constant support;Fair (RW)  Transfer Training  Transfer Training: Yes  Interventions: Safety awareness training;Verbal cues  Sit to Stand: Contact-guard assistance;Additional time;Adaptive equipment (RW)  Stand to Sit: Contact-guard assistance;Additional time;Adaptive equipment (RW)  Gait  Gait Training: Yes  Overall Level of Assistance: Contact-guard assistance;Stand-by assistance  Distance (ft): 200 Feet  Assistive Device: Walker, rolling;Gait belt  Interventions: Verbal cues  Base of Support: Widened  Speed/Chrissy: Slow;Shuffled;Pace decreased (< 100 feet/min)  Step Length: Left shortened;Right shortened  Stance: Left increased;Right increased;Time  Gait Abnormalities: Decreased step clearance     PT Exercises  Exercise Treatment: BLE AP, LAQ, ben, kerry X 10-12     Safety Devices  Type of Devices: Patient at risk for falls;All fall risk precautions in place;Call light within reach;Left in bed;Gait belt;Nurse notified;Bed alarm in place (seated EOB with tray table in front as he was found prior to start of session)       Goals  Short Term Goals  Time Frame for Short Term Goals: 1 week 5/14/24 (unless otherwise specified); 5/8 con't goals  Short Term Goal 1: Pt will complete functional t/f with LRAD with Ayden  Short Term Goal 2: Pt will ambulate >150' with RW with Ayden without LOB  Short Term Goal 3: 5/12/25: Pt will participate in 12-15 reps BLE TE to improve strength and increase safety and IND with mobility and gait  Short Term Goal 4: Pt will meet 3/5 CHF goals  Patient Goals   Patient Goals : \"Be able to walk\"    Education  Patient Education  Education Given To:

## 2024-05-09 LAB
ALBUMIN SERPL-MCNC: 3.9 G/DL (ref 3.4–5)
ANION GAP SERPL CALCULATED.3IONS-SCNC: 12 MMOL/L (ref 3–16)
BASOPHILS # BLD: 0 K/UL (ref 0–0.2)
BASOPHILS NFR BLD: 0.5 %
BUN SERPL-MCNC: 100 MG/DL (ref 7–20)
CALCIUM SERPL-MCNC: 9.4 MG/DL (ref 8.3–10.6)
CHLORIDE SERPL-SCNC: 90 MMOL/L (ref 99–110)
CO2 SERPL-SCNC: 32 MMOL/L (ref 21–32)
CREAT SERPL-MCNC: 1.6 MG/DL (ref 0.8–1.3)
DEPRECATED RDW RBC AUTO: 19.2 % (ref 12.4–15.4)
EOSINOPHIL # BLD: 0.2 K/UL (ref 0–0.6)
EOSINOPHIL NFR BLD: 4 %
GFR SERPLBLD CREATININE-BSD FMLA CKD-EPI: 46 ML/MIN/{1.73_M2}
GLUCOSE BLD-MCNC: 254 MG/DL (ref 70–99)
GLUCOSE BLD-MCNC: 287 MG/DL (ref 70–99)
GLUCOSE BLD-MCNC: 293 MG/DL (ref 70–99)
GLUCOSE BLD-MCNC: 335 MG/DL (ref 70–99)
GLUCOSE SERPL-MCNC: 296 MG/DL (ref 70–99)
HCT VFR BLD AUTO: 41.6 % (ref 40.5–52.5)
HGB BLD-MCNC: 13.6 G/DL (ref 13.5–17.5)
LYMPHOCYTES # BLD: 0.6 K/UL (ref 1–5.1)
LYMPHOCYTES NFR BLD: 11.9 %
MAGNESIUM SERPL-MCNC: 2.3 MG/DL (ref 1.8–2.4)
MCH RBC QN AUTO: 27.7 PG (ref 26–34)
MCHC RBC AUTO-ENTMCNC: 32.7 G/DL (ref 31–36)
MCV RBC AUTO: 84.8 FL (ref 80–100)
MONOCYTES # BLD: 0.5 K/UL (ref 0–1.3)
MONOCYTES NFR BLD: 10.4 %
NEUTROPHILS # BLD: 3.8 K/UL (ref 1.7–7.7)
NEUTROPHILS NFR BLD: 73.2 %
PERFORMED ON: ABNORMAL
PHOSPHATE SERPL-MCNC: 3.9 MG/DL (ref 2.5–4.9)
PLATELET # BLD AUTO: 119 K/UL (ref 135–450)
PMV BLD AUTO: 9.3 FL (ref 5–10.5)
POTASSIUM SERPL-SCNC: 3.2 MMOL/L (ref 3.5–5.1)
RBC # BLD AUTO: 4.91 M/UL (ref 4.2–5.9)
SODIUM SERPL-SCNC: 134 MMOL/L (ref 136–145)
WBC # BLD AUTO: 5.2 K/UL (ref 4–11)

## 2024-05-09 PROCEDURE — 80069 RENAL FUNCTION PANEL: CPT

## 2024-05-09 PROCEDURE — 6370000000 HC RX 637 (ALT 250 FOR IP): Performed by: NURSE PRACTITIONER

## 2024-05-09 PROCEDURE — 6370000000 HC RX 637 (ALT 250 FOR IP): Performed by: INTERNAL MEDICINE

## 2024-05-09 PROCEDURE — 85025 COMPLETE CBC W/AUTO DIFF WBC: CPT

## 2024-05-09 PROCEDURE — 2580000003 HC RX 258: Performed by: INTERNAL MEDICINE

## 2024-05-09 PROCEDURE — 99232 SBSQ HOSP IP/OBS MODERATE 35: CPT | Performed by: NURSE PRACTITIONER

## 2024-05-09 PROCEDURE — 36415 COLL VENOUS BLD VENIPUNCTURE: CPT

## 2024-05-09 PROCEDURE — 97535 SELF CARE MNGMENT TRAINING: CPT

## 2024-05-09 PROCEDURE — 97530 THERAPEUTIC ACTIVITIES: CPT

## 2024-05-09 PROCEDURE — 6360000002 HC RX W HCPCS: Performed by: INTERNAL MEDICINE

## 2024-05-09 PROCEDURE — 2700000000 HC OXYGEN THERAPY PER DAY

## 2024-05-09 PROCEDURE — 83735 ASSAY OF MAGNESIUM: CPT

## 2024-05-09 PROCEDURE — 6370000000 HC RX 637 (ALT 250 FOR IP): Performed by: STUDENT IN AN ORGANIZED HEALTH CARE EDUCATION/TRAINING PROGRAM

## 2024-05-09 PROCEDURE — 94761 N-INVAS EAR/PLS OXIMETRY MLT: CPT

## 2024-05-09 PROCEDURE — 1200000000 HC SEMI PRIVATE

## 2024-05-09 RX ORDER — POTASSIUM CHLORIDE 20 MEQ/1
40 TABLET, EXTENDED RELEASE ORAL ONCE
Status: COMPLETED | OUTPATIENT
Start: 2024-05-09 | End: 2024-05-09

## 2024-05-09 RX ADMIN — BRIMONIDINE TARTRATE 1 DROP: 2 SOLUTION OPHTHALMIC at 10:52

## 2024-05-09 RX ADMIN — TIMOLOL MALEATE 1 DROP: 5 SOLUTION OPHTHALMIC at 10:52

## 2024-05-09 RX ADMIN — TIMOLOL MALEATE 1 DROP: 5 SOLUTION OPHTHALMIC at 21:03

## 2024-05-09 RX ADMIN — BRIMONIDINE TARTRATE 1 DROP: 2 SOLUTION OPHTHALMIC at 21:03

## 2024-05-09 RX ADMIN — DOCUSATE SODIUM 100 MG: 100 CAPSULE, LIQUID FILLED ORAL at 20:57

## 2024-05-09 RX ADMIN — HEPARIN SODIUM 5000 UNITS: 5000 INJECTION INTRAVENOUS; SUBCUTANEOUS at 06:14

## 2024-05-09 RX ADMIN — ATORVASTATIN CALCIUM 80 MG: 80 TABLET, FILM COATED ORAL at 20:57

## 2024-05-09 RX ADMIN — DOCUSATE SODIUM 100 MG: 100 CAPSULE, LIQUID FILLED ORAL at 10:46

## 2024-05-09 RX ADMIN — HEPARIN SODIUM 5000 UNITS: 5000 INJECTION INTRAVENOUS; SUBCUTANEOUS at 20:57

## 2024-05-09 RX ADMIN — TAMSULOSIN HYDROCHLORIDE 0.8 MG: 0.4 CAPSULE ORAL at 10:45

## 2024-05-09 RX ADMIN — MIDODRINE HYDROCHLORIDE 5 MG: 5 TABLET ORAL at 17:06

## 2024-05-09 RX ADMIN — ALLOPURINOL 300 MG: 300 TABLET ORAL at 10:45

## 2024-05-09 RX ADMIN — HEPARIN SODIUM 5000 UNITS: 5000 INJECTION INTRAVENOUS; SUBCUTANEOUS at 14:08

## 2024-05-09 RX ADMIN — ASPIRIN 81 MG: 81 TABLET, COATED ORAL at 10:45

## 2024-05-09 RX ADMIN — POTASSIUM CHLORIDE 40 MEQ: 1500 TABLET, EXTENDED RELEASE ORAL at 06:14

## 2024-05-09 RX ADMIN — PREGABALIN 50 MG: 25 CAPSULE ORAL at 20:57

## 2024-05-09 RX ADMIN — INSULIN LISPRO 2 UNITS: 100 INJECTION, SOLUTION INTRAVENOUS; SUBCUTANEOUS at 08:33

## 2024-05-09 RX ADMIN — PANTOPRAZOLE SODIUM 40 MG: 40 TABLET, DELAYED RELEASE ORAL at 10:45

## 2024-05-09 RX ADMIN — DORZOLAMIDE HYDROCHLORIDE 1 DROP: 20 SOLUTION/ DROPS OPHTHALMIC at 10:52

## 2024-05-09 RX ADMIN — OXYCODONE AND ACETAMINOPHEN 1 TABLET: 5; 325 TABLET ORAL at 20:56

## 2024-05-09 RX ADMIN — Medication 10 ML: at 21:07

## 2024-05-09 RX ADMIN — INSULIN GLARGINE 45 UNITS: 100 INJECTION, SOLUTION SUBCUTANEOUS at 20:58

## 2024-05-09 RX ADMIN — POTASSIUM BICARBONATE 40 MEQ: 782 TABLET, EFFERVESCENT ORAL at 10:50

## 2024-05-09 RX ADMIN — OXYCODONE AND ACETAMINOPHEN 1 TABLET: 5; 325 TABLET ORAL at 10:51

## 2024-05-09 RX ADMIN — PREGABALIN 50 MG: 25 CAPSULE ORAL at 10:46

## 2024-05-09 RX ADMIN — INSULIN LISPRO 2 UNITS: 100 INJECTION, SOLUTION INTRAVENOUS; SUBCUTANEOUS at 13:26

## 2024-05-09 RX ADMIN — INSULIN LISPRO 4 UNITS: 100 INJECTION, SOLUTION INTRAVENOUS; SUBCUTANEOUS at 20:58

## 2024-05-09 RX ADMIN — POLYETHYLENE GLYCOL 3350 17 G: 17 POWDER, FOR SOLUTION ORAL at 10:46

## 2024-05-09 RX ADMIN — DORZOLAMIDE HYDROCHLORIDE 1 DROP: 20 SOLUTION/ DROPS OPHTHALMIC at 21:02

## 2024-05-09 RX ADMIN — Medication 10 ML: at 10:50

## 2024-05-09 RX ADMIN — MIDODRINE HYDROCHLORIDE 5 MG: 5 TABLET ORAL at 10:45

## 2024-05-09 RX ADMIN — INSULIN LISPRO 2 UNITS: 100 INJECTION, SOLUTION INTRAVENOUS; SUBCUTANEOUS at 17:06

## 2024-05-09 ASSESSMENT — PAIN SCALES - GENERAL
PAINLEVEL_OUTOF10: 6
PAINLEVEL_OUTOF10: 8

## 2024-05-09 ASSESSMENT — PAIN SCALES - WONG BAKER: WONGBAKER_NUMERICALRESPONSE: NO HURT

## 2024-05-09 ASSESSMENT — PAIN - FUNCTIONAL ASSESSMENT: PAIN_FUNCTIONAL_ASSESSMENT: PREVENTS OR INTERFERES SOME ACTIVE ACTIVITIES AND ADLS

## 2024-05-09 ASSESSMENT — PAIN DESCRIPTION - ORIENTATION
ORIENTATION: RIGHT;LEFT
ORIENTATION: MID;POSTERIOR;RIGHT;LEFT

## 2024-05-09 ASSESSMENT — PAIN DESCRIPTION - LOCATION
LOCATION: BACK;NECK;FOOT
LOCATION: FOOT;LEG

## 2024-05-09 ASSESSMENT — PAIN DESCRIPTION - DESCRIPTORS: DESCRIPTORS: SPASM;SHOOTING;SHARP

## 2024-05-09 ASSESSMENT — PAIN DESCRIPTION - PAIN TYPE: TYPE: CHRONIC PAIN

## 2024-05-09 ASSESSMENT — PAIN DESCRIPTION - FREQUENCY: FREQUENCY: CONTINUOUS

## 2024-05-09 ASSESSMENT — PAIN DESCRIPTION - ONSET: ONSET: GRADUAL

## 2024-05-09 NOTE — PLAN OF CARE
Problem: Pain  Goal: Verbalizes/displays adequate comfort level or baseline comfort level  5/9/2024 1655 by Candace Stein, RN  Outcome: Progressing     Problem: Safety - Adult  Goal: Free from fall injury  5/9/2024 1655 by Candace Stein, RN  Outcome: Progressing     Problem: Discharge Planning  Goal: Discharge to home or other facility with appropriate resources  5/9/2024 1655 by Candace Stein, RN  Outcome: Progressing

## 2024-05-09 NOTE — PROGRESS NOTES
V2.0    [facility] Progress Note      Name:  Shay Townsend /Age/Sex: 1955  (69 y.o. male)   MRN & CSN:  [id number] & [id number] Encounter Date/Time: 2024 8:55 AM EDT   Location:   PCP: Julia Stein MD     Attending:Alvaro Nolasco MD       Hospital Day: 8    Assessment and Recommendations   Shay Townsend is a 69 y.o. male with pmh of  CHF, PAF, CAD s/p CABG, CKD3, DM2, HTN, DMII, HTN, HLD, and  PVD who presents with CHF NYHA class I, acute, diastolic (HCC)      Plan:     Acute on chronic diastolic congestive heart failure, appears decompensated DCHF mostly likely secondary to HTN heart disease.  - TTE on 22 with LV systolic function low normal with EF 50-55%. Grade 3 DD. Right ventricular systolic function is reduced. Aortic valve appears functionally bicuspid with severely calcified and fused left and non-coronary cusps. Moderate aortic stenosis and regurgitation.  - Repeat Echo on 24 with LVEF 50-54% with indeterminate relaxation. Septal flattening is noted consistent with RV volume/pressure overload. RV poorly visualized, but appears moderately dilated with reduced systolic function. Severely enlarged right atrium. Calcified aortic valve with moderate stenosis and mild regurgitation. Mild to moderate tricuspid valve regurgitation. Severe pulmonary hypertension.   - IV lasix gtt per nephro, increased to 10mg/hr on 24 per nephro recs  - Metolazone x 1 given 24 per nephrology  - Diamox  per Nephrology. Now trial off diuresis given adequate response over last 24 hours and transition to torsemide in AM on    - Continue home carvedilol  - Daily weights, strict I's and O's  - Cardiology consulted, appreciate their input     Hypoxia 2/2 ACHF   - CXR with mild cardiomegaly and mild central pulmonary congestion which is more prominent. Hazy perihilar bibasilar interstitial ground glass opacity which is slightly more prominent may be related to pulmonary edema  Imaging   US ABDOMEN LIMITED    Result Date: 5/6/2024  EXAMINATION: RIGHT UPPER QUADRANT ULTRASOUND 5/3/2024 5:15 pm COMPARISON: None. HISTORY: ORDERING SYSTEM PROVIDED HISTORY: check for ascites TECHNOLOGIST PROVIDED HISTORY: Reason for exam:->check for ascites FINDINGS: Trace ascites was seen in the right upper quadrant.  The amount of ascites does not justify paracentesis.     Trace ascites in the right upper quadrant.     XR CHEST (2 VW)    Result Date: 5/2/2024  EXAMINATION: TWO XRAY VIEWS OF THE CHEST 5/2/2024 1:52 pm COMPARISON: 08/11/2023 HISTORY: ORDERING SYSTEM PROVIDED HISTORY: shortness of breath TECHNOLOGIST PROVIDED HISTORY: Reason for exam:->shortness of breath Reason for Exam: SOB FINDINGS: The heart is mildly enlarged unchanged.  The pulmonary vessels are engorged centrally more prominent there is some hazy perihilar bibasilar interstitial and ground-glass opacity which is slightly more prominent.  No effusion is seen.  The bones are intact.  There are postop changes along the mediastinum and sternum.     Mild cardiomegaly and mild central pulmonary congestion which is more prominent Hazy perihilar bibasilar interstitial ground-glass opacity which is slightly more prominent may be related to pulmonary edema and/or early infiltrates from pneumonia.  Recommend follow-up.       CBC:   Recent Labs     05/07/24 0441 05/08/24 0436 05/09/24 0432   WBC 5.3 5.8 5.2   HGB 13.1* 13.5 13.6   * 118* 119*     BMP:    Recent Labs     05/07/24 0441 05/08/24  0436 05/09/24 0432   * 135* 134*   K 3.4* 4.0 3.2*   CL 87* 88* 90*   CO2 35* 37* 32   * 103* 100*   CREATININE 2.0* 1.7* 1.6*   GLUCOSE 241* 216* 296*     Hepatic: No results for input(s): \"AST\", \"ALT\", \"BILITOT\", \"ALKPHOS\" in the last 72 hours.    Invalid input(s): \"ALB\"  Lipids:   Lab Results   Component Value Date/Time    CHOL 77 05/03/2024 04:31 AM    HDL 35 05/03/2024 04:31 AM    HDL 31 01/11/2012 06:04 AM    TRIG 73 05/03/2024

## 2024-05-09 NOTE — PLAN OF CARE
CHF Care Plan      Patient's EF (Ejection Fraction) is greater than 40%    Heart Failure Medications:  Diuretics:: Torsemide    (One of the following REQUIRED for EF </= 40%/SYSTOLIC FAILURE but MAY be used in EF% >40%/DIASTOLIC FAILURE)        ACE:: None        ARB:: None         ARNI:: None    (Beta Blockers)  NON- Evidenced Based Beta Blocker (for EF% >40%/DIASTOLIC FAILURE): None    Evidenced Based Beta Blocker::(REQUIRED for EF% <40%/SYSTOLIC FAILURE) Carvedilol- Coreg  ...................................................................................................................................................    Failed to redirect to the Timeline version of the [employer] SmartLink.      Patient's weights and intake/output reviewed    Daily Weight log at bedside, patient/family participation in use of log: \"yes    Patient's current weight today shows a difference of 2 lbs less than last documented weight.      Intake/Output Summary (Last 24 hours) at 5/9/2024 1654  Last data filed at 5/9/2024 1257  Gross per 24 hour   Intake 120 ml   Output 3675 ml   Net -3555 ml       Education Booklet Provided: yes    Comorbidities Reviewed Yes    Patient has a past medical history of Anemia, Angina, Arthritis, CAD (coronary artery disease), CHF (congestive heart failure) ([facility]), CKD (chronic kidney disease) stage 3, GFR 30-59 ml/min (HCC), Clostridium difficile diarrhea, Diabetes mellitus (HCC), Diabetic neuropathy (HCC), Disease of blood and blood forming organ, Dizziness, GERD (gastroesophageal reflux disease), Headache, Hearing loss, Hyperlipidemia, Hypertension, Kidney stone, Refusal of blood transfusions as patient is Orthodox, Sleep apnea, Tinnitus, Venous ulcer (HCC), and Wound, open.     >>For CHF and Comorbidity documentation on Education Time and Topics, please see Education Tab      Pt sitting in bed at this time on  1 L O2. Pt denies shortness of breath. Pt with pitting lower extremity edema.     Patient

## 2024-05-09 NOTE — PROGRESS NOTES
[facility]   Heart Failure Daily Progress Note    Admit Date:  5/2/2024  HPI:    Chief Complaint   Patient presents with    Shortness of Breath     10 lb weight gain past week, placed on 3L this past week, usually does not wear oxygen, increasing sob, resides at [facility] [place],         Shay Townsend is being followed for shortness of breath. Known patient to me from CHF clinic. Resides at [facility].   Hx of CAD s/p CABG and CKD.     Interval history: lasix infusion increased to 15mg/hr on 5/6/24.   Diamox given 5/7/2024; over 6L UOP last 24 hours, with weight down 9lbs overnight.    Lasix infusion stopped 5/8/2024    Subjective:  Mr. Townsend edema is improved.     Objective:   /62   Pulse 85   Temp 97.7 °F (36.5 °C) (Oral)   Resp 18   Ht 1.727 m (5' 8\")   Wt 112.9 kg (249 lb)   SpO2 94%   BMI 37.86 kg/m²     Intake/Output Summary (Last 24 hours) at 5/9/2024 0703  Last data filed at 5/9/2024 0411  Gross per 24 hour   Intake --   Output 3975 ml   Net -3975 ml       NYHA: IV    Physical Exam:  General:  Awake, alert, NAD, sitting on side of the bed   Skin:  Warm and dry  Chest:  no  in the bases to auscultation, no wheezes/rhonchi  Cardiovascular:  irregular, reg S1S2, no m/r/g   Abdomen:  Soft, nontender, +bowel sounds  Extremities:  + bilateral lower extremity edema    Medications:    insulin glargine  45 Units SubCUTAneous Nightly    sodium phosphate  1 enema Rectal Once    polyethylene glycol  17 g Oral BID    midodrine  5 mg Oral BID WC    allopurinol  300 mg Oral Daily    aspirin EC  81 mg Oral Daily    atorvastatin  80 mg Oral Nightly    carvedilol  3.125 mg Oral BID WC    docusate sodium  100 mg Oral BID    dorzolamide  1 drop Left Eye BID    linaclotide  290 mcg Oral QAM AC    pantoprazole  40 mg Oral Daily    pregabalin  50 mg Oral BID    Netarsudil Dimesylate  1 drop Left Eye Nightly    tamsulosin  0.8 mg Oral Daily    sodium chloride flush  5-40 mL IntraVENous 2 times per

## 2024-05-09 NOTE — PROGRESS NOTES
Occupational Therapy  Facility/Department: [facility] A2 CARD TELEMETRY  Daily Treatment Note  NAME: Shay Townsend  : 1955  MRN: [id number]    Date of Service: 2024    Discharge Recommendations:  Long Term Care with OT  OT Equipment Recommendations  Equipment Needed: No    If pt is unable to be seen after this session, please let this note serve as discharge summary.  Please see case management note for discharge disposition.  Thank you.    Patient Diagnosis(es): The primary encounter diagnosis was Acute on chronic congestive heart failure, unspecified heart failure type (HCC). Diagnoses of Acute worsening of stage 3 chronic kidney disease (HCC) and Dysphagia, unspecified type were also pertinent to this visit.     Assessment    Assessment: Pt tolerated session well, performing community distance mobility in hallway w/ RW and CGA. He completed sit<>stand transfers w/ SBA and standing grooming tasks w/ CGA and RW. Pt required mod A for LB dressing. He required VC throughout mobility for safety/manuevering around obstacles 2/2 low vision. Pt is limited by weakness, fatigue, balance, endurance, safety awareness, and activity tolerance and will benefit from continued acute OT. Recommend LTC w/ OT upon d/c to address functional deficits within scope of medical complexity.  Activity Tolerance: Patient tolerated treatment well  Discharge Recommendations: Long Term Care with OT  Equipment Needed: No      Plan   Occupational Therapy Plan  Times Per Week: 2-3x/wk  Current Treatment Recommendations: Strengthening;Balance training;Functional mobility training;Endurance training;Safety education & training;Patient/Caregiver education & training;Equipment evaluation, education, & procurement;Positioning;Self-Care / ADL     Restrictions  Restrictions/Precautions  Restrictions/Precautions: Fall Risk;General Precautions  Position Activity Restriction  Other position/activity restrictions: O2 NC, farhana, IV    Subjective  Therapy;Plan of Care;ADL Adaptive Strategies;Transfer Training;Energy Conservation  Education Method: Verbal  Barriers to Learning: Vision  Education Outcome: Verbalized understanding;Continued education needed    Goals  Short Term Goals  Time Frame for Short Term Goals: Short term goals to be met by 5/14 unless otherwise specified- goals ongoing 5/09  Short Term Goal 1: Pt will perform toileting tasks w/ min A by 5/12  Short Term Goal 2: Pt will perform LB dressing w/ min A and LRAD  Short Term Goal 3: Pt will tolerate 8 min stance at RW to perform grooming tasks w/ SBA  Short Term Goal 4: Pt will tolerate x15 BUE ther ex  Short Term Goal 5: Pt to voice understanding with teach-back of 1/3 heart failure goals-  Patient Goals   Patient goals : not stated    AM-PAC - ADL  AM-PAC Daily Activity - Inpatient   How much help is needed for putting on and taking off regular lower body clothing?: A Lot  How much help is needed for bathing (which includes washing, rinsing, drying)?: A Lot  How much help is needed for toileting (which includes using toilet, bedpan, or urinal)?: A Lot  How much help is needed for putting on and taking off regular upper body clothing?: A Little  How much help is needed for taking care of personal grooming?: A Little  How much help for eating meals?: None  AM-PAC Inpatient Daily Activity Raw Score: 16  AM-PAC Inpatient ADL T-Scale Score : 35.96  ADL Inpatient CMS 0-100% Score: 53.32  ADL Inpatient CMS G-Code Modifier : CK    Therapy Time   Individual Concurrent Group Co-treatment   Time In 1005         Time Out 1037         Minutes 32         Timed Code Treatment Minutes: 32 Minutes       Any Smith OT

## 2024-05-09 NOTE — PROGRESS NOTES
[facility] Progress Note           Subjective/   69 y.o. year old male who we are seeing in consultation for CKD stage 3b.     HPI:  Renal function stable with diuresis, non-oliguric.  States shortness of breath better, on 1-2 L NC, edema better.    ROS:  Intake adequate, +weak.    Objective/   GEN:  Chronically ill, BP (!) 108/58   Pulse 84   Temp 97.8 °F (36.6 °C) (Oral)   Resp 18   Ht 1.727 m (5' 8\")   Wt 112.9 kg (249 lb)   SpO2 95%   BMI 37.86 kg/m²   HEENT: non-icteric, no JVD  CV: S1, S2 without m/r/g; ++ LE edema  RESP: CTA B without w/r/r; breathing wnl  ABD: +bs, soft, nt, no hsm  SKIN: warm, no rashes    Data/  Recent Labs     05/07/24 0441 05/08/24  0436 05/09/24  0432   WBC 5.3 5.8 5.2   HGB 13.1* 13.5 13.6   HCT 40.5 41.5 41.6   MCV 85.4 85.5 84.8   * 118* 119*       Recent Labs     05/07/24  0441 05/08/24  0436 05/09/24  0432   * 135* 134*   K 3.4* 4.0 3.2*   CL 87* 88* 90*   CO2 35* 37* 32   GLUCOSE 241* 216* 296*   PHOS  --  4.1 3.9   MG 2.20  --   --    * 103* 100*   CREATININE 2.0* 1.7* 1.6*   LABGLOM 35* 43* 46*         Assessment/     - Acute on Chronic kidney disease stage 3b - Cardiorenal syndrome   SCr 1.5-1.7    - CHF    - Hyponatremia    - Hypertension    - Hypokalemia - prn replacement      Plan/     - Trial off diuresis given adequate response over last 48 hours, seems to be auto-diuresing, transition to torsemide/diamox regimen in AM   - Continue midodrine for BP support  - Trend labs, bp's, weights, & urine output    Okay for discharge planning hopefully in AM    ____________________________________  Ben Bryant MD  [facility]  [email or web address]  Office: [phone]

## 2024-05-09 NOTE — CARE COORDINATION
Therapy rec's of returning to [facility] noted.   Placed call to Rita with [facility]  to update her.  She states they will give him some therapy when he returns under his part B so no need to initiate precert.  Patient will just need an ambulance transport to return.

## 2024-05-10 VITALS
SYSTOLIC BLOOD PRESSURE: 106 MMHG | HEIGHT: 68 IN | DIASTOLIC BLOOD PRESSURE: 68 MMHG | TEMPERATURE: 97.4 F | WEIGHT: 247.9 LBS | OXYGEN SATURATION: 92 % | HEART RATE: 87 BPM | RESPIRATION RATE: 17 BRPM | BODY MASS INDEX: 37.57 KG/M2

## 2024-05-10 LAB
ALBUMIN SERPL-MCNC: 3.8 G/DL (ref 3.4–5)
ALBUMIN SERPL-MCNC: 3.8 G/DL (ref 3.4–5)
ANION GAP SERPL CALCULATED.3IONS-SCNC: 12 MMOL/L (ref 3–16)
ANION GAP SERPL CALCULATED.3IONS-SCNC: 14 MMOL/L (ref 3–16)
BASOPHILS # BLD: 0 K/UL (ref 0–0.2)
BASOPHILS NFR BLD: 0.7 %
BUN SERPL-MCNC: 102 MG/DL (ref 7–20)
BUN SERPL-MCNC: 97 MG/DL (ref 7–20)
CALCIUM SERPL-MCNC: 9.4 MG/DL (ref 8.3–10.6)
CALCIUM SERPL-MCNC: 9.5 MG/DL (ref 8.3–10.6)
CHLORIDE SERPL-SCNC: 91 MMOL/L (ref 99–110)
CHLORIDE SERPL-SCNC: 91 MMOL/L (ref 99–110)
CO2 SERPL-SCNC: 29 MMOL/L (ref 21–32)
CO2 SERPL-SCNC: 32 MMOL/L (ref 21–32)
CREAT SERPL-MCNC: 1.7 MG/DL (ref 0.8–1.3)
CREAT SERPL-MCNC: 1.7 MG/DL (ref 0.8–1.3)
DEPRECATED RDW RBC AUTO: 19 % (ref 12.4–15.4)
EKG ATRIAL RATE: 88 BPM
EKG DIAGNOSIS: NORMAL
EKG Q-T INTERVAL: 370 MS
EKG QRS DURATION: 86 MS
EKG QTC CALCULATION (BAZETT): 445 MS
EKG R AXIS: -18 DEGREES
EKG T AXIS: 84 DEGREES
EKG VENTRICULAR RATE: 87 BPM
EOSINOPHIL # BLD: 0.3 K/UL (ref 0–0.6)
EOSINOPHIL NFR BLD: 4.6 %
GFR SERPLBLD CREATININE-BSD FMLA CKD-EPI: 43 ML/MIN/{1.73_M2}
GFR SERPLBLD CREATININE-BSD FMLA CKD-EPI: 43 ML/MIN/{1.73_M2}
GLUCOSE BLD-MCNC: 231 MG/DL (ref 70–99)
GLUCOSE BLD-MCNC: 249 MG/DL (ref 70–99)
GLUCOSE SERPL-MCNC: 243 MG/DL (ref 70–99)
GLUCOSE SERPL-MCNC: 294 MG/DL (ref 70–99)
HCT VFR BLD AUTO: 41.1 % (ref 40.5–52.5)
HGB BLD-MCNC: 13.2 G/DL (ref 13.5–17.5)
LYMPHOCYTES # BLD: 0.8 K/UL (ref 1–5.1)
LYMPHOCYTES NFR BLD: 14.7 %
MAGNESIUM SERPL-MCNC: 2.4 MG/DL (ref 1.8–2.4)
MCH RBC QN AUTO: 27.6 PG (ref 26–34)
MCHC RBC AUTO-ENTMCNC: 32.2 G/DL (ref 31–36)
MCV RBC AUTO: 85.7 FL (ref 80–100)
MONOCYTES # BLD: 0.7 K/UL (ref 0–1.3)
MONOCYTES NFR BLD: 12.6 %
NEUTROPHILS # BLD: 3.7 K/UL (ref 1.7–7.7)
NEUTROPHILS NFR BLD: 67.4 %
PERFORMED ON: ABNORMAL
PERFORMED ON: ABNORMAL
PHOSPHATE SERPL-MCNC: 3.1 MG/DL (ref 2.5–4.9)
PHOSPHATE SERPL-MCNC: 3.3 MG/DL (ref 2.5–4.9)
PLATELET # BLD AUTO: 118 K/UL (ref 135–450)
PMV BLD AUTO: 9.4 FL (ref 5–10.5)
POTASSIUM SERPL-SCNC: 3.2 MMOL/L (ref 3.5–5.1)
POTASSIUM SERPL-SCNC: 4.6 MMOL/L (ref 3.5–5.1)
RBC # BLD AUTO: 4.8 M/UL (ref 4.2–5.9)
SODIUM SERPL-SCNC: 134 MMOL/L (ref 136–145)
SODIUM SERPL-SCNC: 135 MMOL/L (ref 136–145)
WBC # BLD AUTO: 5.6 K/UL (ref 4–11)

## 2024-05-10 PROCEDURE — 2580000003 HC RX 258: Performed by: INTERNAL MEDICINE

## 2024-05-10 PROCEDURE — 6370000000 HC RX 637 (ALT 250 FOR IP): Performed by: NURSE PRACTITIONER

## 2024-05-10 PROCEDURE — 6370000000 HC RX 637 (ALT 250 FOR IP): Performed by: INTERNAL MEDICINE

## 2024-05-10 PROCEDURE — 36415 COLL VENOUS BLD VENIPUNCTURE: CPT

## 2024-05-10 PROCEDURE — 6360000002 HC RX W HCPCS: Performed by: INTERNAL MEDICINE

## 2024-05-10 PROCEDURE — 80069 RENAL FUNCTION PANEL: CPT

## 2024-05-10 PROCEDURE — 93010 ELECTROCARDIOGRAM REPORT: CPT | Performed by: INTERNAL MEDICINE

## 2024-05-10 PROCEDURE — 93005 ELECTROCARDIOGRAM TRACING: CPT | Performed by: NURSE PRACTITIONER

## 2024-05-10 PROCEDURE — 83735 ASSAY OF MAGNESIUM: CPT

## 2024-05-10 PROCEDURE — 85025 COMPLETE CBC W/AUTO DIFF WBC: CPT

## 2024-05-10 RX ORDER — POTASSIUM CHLORIDE 750 MG/1
10 TABLET, EXTENDED RELEASE ORAL DAILY
Status: DISCONTINUED | OUTPATIENT
Start: 2024-05-11 | End: 2024-05-10 | Stop reason: HOSPADM

## 2024-05-10 RX ORDER — ACETAZOLAMIDE 250 MG/1
250 TABLET ORAL DAILY
Status: DISCONTINUED | OUTPATIENT
Start: 2024-05-11 | End: 2024-05-10 | Stop reason: HOSPADM

## 2024-05-10 RX ORDER — ACETAZOLAMIDE 250 MG/1
250 TABLET ORAL DAILY
Qty: 90 TABLET | Refills: 3 | Status: SHIPPED | OUTPATIENT
Start: 2024-05-11

## 2024-05-10 RX ORDER — METOLAZONE 5 MG/1
5 TABLET ORAL DAILY PRN
Qty: 30 TABLET | Refills: 0 | Status: SHIPPED | OUTPATIENT
Start: 2024-05-10

## 2024-05-10 RX ORDER — TORSEMIDE 100 MG/1
100 TABLET ORAL DAILY
Qty: 30 TABLET | Refills: 3 | Status: SHIPPED | OUTPATIENT
Start: 2024-05-10

## 2024-05-10 RX ORDER — ASPIRIN 81 MG/1
81 TABLET ORAL DAILY
Qty: 30 TABLET | Refills: 3 | Status: SHIPPED | OUTPATIENT
Start: 2024-05-11

## 2024-05-10 RX ORDER — MIDODRINE HYDROCHLORIDE 5 MG/1
5 TABLET ORAL 2 TIMES DAILY WITH MEALS
Qty: 90 TABLET | Refills: 3 | Status: SHIPPED | OUTPATIENT
Start: 2024-05-10

## 2024-05-10 RX ORDER — BRIMONIDINE TARTRATE 2 MG/ML
1 SOLUTION/ DROPS OPHTHALMIC 2 TIMES DAILY
Refills: 3 | Status: CANCELLED | OUTPATIENT
Start: 2024-05-10

## 2024-05-10 RX ORDER — TORSEMIDE 100 MG/1
100 TABLET ORAL DAILY
Status: DISCONTINUED | OUTPATIENT
Start: 2024-05-11 | End: 2024-05-10 | Stop reason: HOSPADM

## 2024-05-10 RX ADMIN — Medication 10 ML: at 07:36

## 2024-05-10 RX ADMIN — ASPIRIN 81 MG: 81 TABLET, COATED ORAL at 07:31

## 2024-05-10 RX ADMIN — POTASSIUM BICARBONATE 40 MEQ: 782 TABLET, EFFERVESCENT ORAL at 12:34

## 2024-05-10 RX ADMIN — DOCUSATE SODIUM 100 MG: 100 CAPSULE, LIQUID FILLED ORAL at 07:30

## 2024-05-10 RX ADMIN — OXYCODONE AND ACETAMINOPHEN 1 TABLET: 5; 325 TABLET ORAL at 05:28

## 2024-05-10 RX ADMIN — ALLOPURINOL 300 MG: 300 TABLET ORAL at 07:31

## 2024-05-10 RX ADMIN — TAMSULOSIN HYDROCHLORIDE 0.8 MG: 0.4 CAPSULE ORAL at 07:30

## 2024-05-10 RX ADMIN — PANTOPRAZOLE SODIUM 40 MG: 40 TABLET, DELAYED RELEASE ORAL at 07:30

## 2024-05-10 RX ADMIN — PREGABALIN 50 MG: 25 CAPSULE ORAL at 07:30

## 2024-05-10 RX ADMIN — DORZOLAMIDE HYDROCHLORIDE 1 DROP: 20 SOLUTION/ DROPS OPHTHALMIC at 07:38

## 2024-05-10 RX ADMIN — OXYCODONE AND ACETAMINOPHEN 1 TABLET: 5; 325 TABLET ORAL at 12:13

## 2024-05-10 RX ADMIN — HEPARIN SODIUM 5000 UNITS: 5000 INJECTION INTRAVENOUS; SUBCUTANEOUS at 05:28

## 2024-05-10 RX ADMIN — INSULIN LISPRO 1 UNITS: 100 INJECTION, SOLUTION INTRAVENOUS; SUBCUTANEOUS at 07:31

## 2024-05-10 RX ADMIN — MIDODRINE HYDROCHLORIDE 5 MG: 5 TABLET ORAL at 07:31

## 2024-05-10 RX ADMIN — POLYETHYLENE GLYCOL 3350 17 G: 17 POWDER, FOR SOLUTION ORAL at 07:31

## 2024-05-10 RX ADMIN — INSULIN LISPRO 1 UNITS: 100 INJECTION, SOLUTION INTRAVENOUS; SUBCUTANEOUS at 12:34

## 2024-05-10 RX ADMIN — BRIMONIDINE TARTRATE 1 DROP: 2 SOLUTION OPHTHALMIC at 07:38

## 2024-05-10 RX ADMIN — POTASSIUM BICARBONATE 40 MEQ: 782 TABLET, EFFERVESCENT ORAL at 09:43

## 2024-05-10 RX ADMIN — TIMOLOL MALEATE 1 DROP: 5 SOLUTION OPHTHALMIC at 07:38

## 2024-05-10 ASSESSMENT — PAIN DESCRIPTION - ONSET: ONSET: ON-GOING

## 2024-05-10 ASSESSMENT — PAIN DESCRIPTION - FREQUENCY: FREQUENCY: CONTINUOUS

## 2024-05-10 ASSESSMENT — PAIN DESCRIPTION - DESCRIPTORS
DESCRIPTORS: ACHING
DESCRIPTORS: ACHING
DESCRIPTORS: ACHING;DISCOMFORT

## 2024-05-10 ASSESSMENT — PAIN DESCRIPTION - LOCATION
LOCATION: LEG;FOOT
LOCATION: LEG;FOOT
LOCATION: FOOT;LEG

## 2024-05-10 ASSESSMENT — PAIN DESCRIPTION - PAIN TYPE: TYPE: CHRONIC PAIN

## 2024-05-10 ASSESSMENT — PAIN SCALES - GENERAL
PAINLEVEL_OUTOF10: 7
PAINLEVEL_OUTOF10: 7
PAINLEVEL_OUTOF10: 8

## 2024-05-10 ASSESSMENT — PAIN - FUNCTIONAL ASSESSMENT: PAIN_FUNCTIONAL_ASSESSMENT: PREVENTS OR INTERFERES SOME ACTIVE ACTIVITIES AND ADLS

## 2024-05-10 ASSESSMENT — PAIN DESCRIPTION - ORIENTATION: ORIENTATION: RIGHT;LEFT

## 2024-05-10 NOTE — PROGRESS NOTES
Cardio note    12:07 RN contacted STEWART Grajeda NP via perfect serve in regards to patient stating \"I feel like my heart is dancing (racing/shaking) when I lay down.\" Patient also said they have experienced this before, but not during this admission. Patient is not experiencing any symptoms at the moment and is eating lunch at bedside comfortably.     EKG  ordered.     [facility] contacted RN about a large First Degree AV block according to strip and RN notified NP.     1:06 PM- EKG Tech Called RN about Abnormal Results and RN went to bedside. Patient had no complaints of chest pain at this time.     1:08-1:15pm RN contacted Cardiology Department RN reached out to A2  Charge and Clinical.      1:08 PM RN reached out to NP and stated that the EKG resulted and \"It says ACUTE MI/STEMI  on the EKG\" Please advise. And at 1:15 pm NP said to consult cardiology.     1:36 Cardiology return page about read of EKG. EKG unchanged and pt okay to DC.     1:49 PM Cardiology said EKG was unchanged from previous EKG and patient can be discharged from Cardio standpoint.     1:58 NP stated pt okay to DC     Will continue of plan.     Erinn Granda, RN

## 2024-05-10 NOTE — CARE COORDINATION
CASE MANAGEMENT DISCHARGE SUMMARY      Discharge to: [facility]    Precertification completed: NA  Hospital Exemption Notification (HENS) completed: NA    IMM given: 5/10/24    New Durable Medical Equipment ordered/agency: NA    Transportation:       Medical Transport explained to pt/family. Pt/family voice no agency preference.    Agency used:[employer]    time:1600   Ambulance form completed: Yes    Confirmed discharge plan with:     Patient: yes     Family:  yes, per patient     Facility/Agency, name:  [facility] to obtain from Epic   Phone number for report to facility: [phone]     RN, name: Erinn    Note: Discharging nurse to complete [facility], reconcile [facility], and place final copy with patient's discharge packet. RN to ensure that written prescriptions for  Level II medications are sent with patient to the facility as per protocol.

## 2024-05-10 NOTE — PLAN OF CARE
Problem: Discharge Planning  Goal: Discharge to home or other facility with appropriate resources  5/10/2024 1603 by Erinn Granda RN  Outcome: Adequate for Discharge  5/10/2024 0422 by Sheri Nath RN  Outcome: Progressing     Problem: Pain  Goal: Verbalizes/displays adequate comfort level or baseline comfort level  5/10/2024 1603 by Erinn Granda RN  Outcome: Adequate for Discharge  5/10/2024 0422 by Sheri Nath RN  Outcome: Progressing     Problem: Skin/Tissue Integrity  Goal: Absence of new skin breakdown  Description: 1.  Monitor for areas of redness and/or skin breakdown  2.  Assess vascular access sites hourly  3.  Every 4-6 hours minimum:  Change oxygen saturation probe site  4.  Every 4-6 hours:  If on nasal continuous positive airway pressure, respiratory therapy assess nares and determine need for appliance change or resting period.  5/10/2024 1603 by Erinn Granda RN  Outcome: Adequate for Discharge  5/10/2024 0422 by Sheri Nath RN  Outcome: Progressing     Problem: Safety - Adult  Goal: Free from fall injury  5/10/2024 1603 by Erinn Granda RN  Outcome: Adequate for Discharge  5/10/2024 0422 by Sheri Nath RN  Outcome: Progressing     Problem: Chronic Conditions and Co-morbidities  Goal: Patient's chronic conditions and co-morbidity symptoms are monitored and maintained or improved  5/10/2024 1603 by Erinn Granda RN  Outcome: Adequate for Discharge  5/10/2024 0422 by Sheri Nath RN  Outcome: Progressing  Note: Patient with diagnosis of Diabetes. Active orders for ACHS glucose monitoring, SSI and Lantus. Instructed importance of following carb control diet to maintain stable gluocose levels.       Problem: Neurosensory - Adult  Goal: Achieves stable or improved neurological status  Outcome: Adequate for Discharge     Problem: Respiratory - Adult  Goal: Achieves optimal ventilation and oxygenation  Outcome: Adequate for Discharge     Problem: Cardiovascular -

## 2024-05-10 NOTE — PROGRESS NOTES
Comprehensive Nutrition Assessment    Type and Reason for Visit:  Initial, RD Nutrition Re-Screen/LOS    Nutrition Recommendations/Plan:   Recommend CC5, low sodium diet and encourage PO intake   FR per MD  Allow for double protein portions   Monitor nutrition adequacy, pertinent labs, bowel habits, wt changes, and clinical progress     Malnutrition Assessment:  Malnutrition Status:  No malnutrition (05/10/24 1303)    Context:  Acute Illness       Nutrition Assessment:    LOS assessment: 69 y.o. m w/ PMHx significant for CKD, CHF, CAD, CABG, DM, HTN and hyperlipidemia admitted w/ hypoxia 2/2 CHF. On CC3, low sodium diet- will modify to CC5, low sodium diet. On 1800 ml FR. PO intakes % of meals. Pt reports good appetite, eating most meals. Reports wt varying d/t fluid, wt trending down in EMR (pt also -18 L since admission). Pt reports his meals are not always filling enough, RD to add double protein portions w/ meals. Provided CHF and DM diet education handouts, pt reports he has received education in the past. Continue to encourage PO intake, will continue to monitor.    Nutrition Related Findings:    -335 x 24 hours. -18 L since admission. + 2 BM on 5/7. BLE + 2 pitting edema. Wound Type: Venous Stasis       Current Nutrition Intake & Therapies:    Average Meal Intake: 51-75%, %  Average Supplements Intake: None Ordered  ADULT DIET; Regular; 3 carb choices (45 gm/meal); Low Sodium (2 gm); 1800 ml    Anthropometric Measures:  Height: 172.7 cm (5' 8\")  Ideal Body Weight (IBW): 154 lbs (70 kg)       Current Body Weight: 112 kg (247 lb), 160.4 % IBW. Weight Source: Standing Scale  Current BMI (kg/m2): 37.6        Weight Adjustment For: No Adjustment                 BMI Categories: Obese Class 2 (BMI 35.0 -39.9)    Estimated Daily Nutrient Needs:  Energy Requirements Based On: Kcal/kg (28-33 kcals/kg)  Weight Used for Energy Requirements: Ideal (70 kg)  Energy (kcal/day): 2156-1038  Weight Used for  Protein Requirements: Ideal (1-1.2 g/kg)  Protein (g/day): 70-84 g  Method Used for Fluid Requirements: 1 ml/kcal  Fluid (ml/day):      Nutrition Diagnosis:   Increased nutrient needs related to increase demand for energy/nutrients as evidenced by weight loss    Nutrition Interventions:   Food and/or Nutrient Delivery: Modify Current Diet, Start Oral Nutrition Supplement  Nutrition Education/Counseling: Education declined  Coordination of Nutrition Care: Continue to monitor while inpatient       Goals:     Goals: PO intake 50% or greater, prior to discharge       Nutrition Monitoring and Evaluation:   Behavioral-Environmental Outcomes: None Identified  Food/Nutrient Intake Outcomes: Food and Nutrient Intake, Supplement Intake  Physical Signs/Symptoms Outcomes: Weight, Nutrition Focused Physical Findings, Biochemical Data    Discharge Planning:    Continue current diet     Jayne Quintana, MS, RD, LD  Contact: Office: [phone]; [place]: [phone]

## 2024-05-10 NOTE — PROGRESS NOTES
Pt d/c'd to [facility] .  Removed peripheral IV and stopped bleeding.  Catheter intact. Pt tolerated well. No redness noted at site.  Notified [facility] and removed tele box.Gave transport   d/c instructions, home meds, and  f/u information. Patient verbalized understanding. Patient was sent with ipad, , glasses and home medications. Patient left the unit in stable condition via stretcher. RN called report to Raya MATTA at [facility]. No further questions.       Erinn Granda RN     RN received call from [facility] that patient was missing their dentures. RN checked patients room on A1 for dentures and called A2 to check patient's previous room before transfer today. RN looked at documented belongings and saw that Upper Dentures was documented upon arrival. RN called Raya back and notified RN. RN stated that patient's top dentures were in upon arrival to [facility]. RN notified Manager of  missing dentures. Will continue plan of care.

## 2024-05-10 NOTE — PROGRESS NOTES
[facility] Progress Note           Subjective/   69 y.o. year old male who we are seeing in consultation for CKD stage 3b.     HPI:  Renal function stable with diuresis, non-oliguric.  States shortness of breath better, on 1-2 L NC, edema better.  Having some chest fluttering when he lies down.    ROS:  Intake adequate, +weak.    Objective/   GEN:  Chronically ill, /67   Pulse 85   Temp 97.6 °F (36.4 °C) (Axillary)   Resp 16   Ht 1.727 m (5' 8\")   Wt 112.4 kg (247 lb 14.4 oz)   SpO2 93%   BMI 37.69 kg/m²   HEENT: non-icteric, no JVD  CV: S1, S2 without m/r/g; ++ LE edema  RESP: CTA B without w/r/r; breathing wnl  ABD: +bs, soft, nt, no hsm  SKIN: warm, no rashes    Data/  Recent Labs     05/08/24 0436 05/09/24  0432 05/10/24  0446   WBC 5.8 5.2 5.6   HGB 13.5 13.6 13.2*   HCT 41.5 41.6 41.1   MCV 85.5 84.8 85.7   * 119* 118*       Recent Labs     05/08/24 0436 05/09/24  0432 05/10/24  0446   * 134* 135*   K 4.0 3.2* 3.2*   CL 88* 90* 91*   CO2 37* 32 32   GLUCOSE 216* 296* 243*   PHOS 4.1 3.9 3.3   MG  --  2.30 2.40   * 100* 102*   CREATININE 1.7* 1.6* 1.7*   LABGLOM 43* 46* 43*         Assessment/     - Acute on Chronic kidney disease stage 3b - Cardiorenal syndrome   SCr 1.5-1.7    - CHF    - Hyponatremia    - Hypertension    - Hypokalemia - prn replacement      Plan/     -Transition to torsemide 100 mg a day & diamox 250 mg a day regimen with prn metoalazone on discharge  - Continue midodrine 5 mg po bid for BP support  - Continue kcl 10 meq a day (home dosing) on discharge, received additional kcl 80 meq on 5/9 & 5/10 while off diuretics  - Trend labs, bp's, weights, & urine output    Okay for discharge planning   Recheck labs in 1 week arranged with office    ____________________________________  Ben Bryant MD  [facility]  [email or web address]  Office: [phone]

## 2024-05-10 NOTE — PLAN OF CARE
Problem: Discharge Planning  Goal: Discharge to home or other facility with appropriate resources  5/10/2024 0422 by Sheri Nath RN  Outcome: Progressing     Problem: Pain  Goal: Verbalizes/displays adequate comfort level or baseline comfort level  5/10/2024 0422 by Sheri Nath RN  Outcome: Progressing     Problem: Skin/Tissue Integrity  Goal: Absence of new skin breakdown  Description: 1.  Monitor for areas of redness and/or skin breakdown  2.  Assess vascular access sites hourly  3.  Every 4-6 hours minimum:  Change oxygen saturation probe site  4.  Every 4-6 hours:  If on nasal continuous positive airway pressure, respiratory therapy assess nares and determine need for appliance change or resting period.  Outcome: Progressing     Problem: Safety - Adult  Goal: Free from fall injury  5/10/2024 0422 by Sheri Nath RN  Outcome: Progressing     Problem: Chronic Conditions and Co-morbidities  Goal: Patient's chronic conditions and co-morbidity symptoms are monitored and maintained or improved  Outcome: Progressing  Note: Patient with diagnosis of Diabetes. Active orders for ACHS glucose monitoring, SSI and Lantus. Instructed importance of following carb control diet to maintain stable gluocose levels.       CHF Care Plan      Patient's EF (Ejection Fraction) is greater than 40%    Heart Failure Medications:  Diuretics:: Torsemide    (One of the following REQUIRED for EF </= 40%/SYSTOLIC FAILURE but MAY be used in EF% >40%/DIASTOLIC FAILURE)        ACE:: None        ARB:: None         ARNI:: None    (Beta Blockers)  NON- Evidenced Based Beta Blocker (for EF% >40%/DIASTOLIC FAILURE): None    Evidenced Based Beta Blocker::(REQUIRED for EF% <40%/SYSTOLIC FAILURE) Carvedilol- Coreg  ...................................................................................................................................................    Failed to redirect to the Timeline version of the [employer] SmartLink.      Patient's

## 2024-05-10 NOTE — DISCHARGE SUMMARY
V2.0  Discharge Summary    Name:  Shay Townsend /Age/Sex: 1955 (69 y.o. male)   Admit Date: 2024  Discharge Date: 5/10/24    MRN & CSN:  [id number] & [id number] Encounter Date and Time 5/10/24 2:59 PM EDT    Attending:  Alvaro Nolasco MD Discharging Provider: PATRICK Gonzalez [facility] Course:     Brief HPI: Shay Townsend is a 69 y.o. male who presented with PMHx significant for CKD diastolic CHF CAD CABG diabetes hypertension and hyperlipidemia.  He is a long-term care resident and started with shortness of breath few days ago is worsened and currently he needs oxygen. He was sent to the emergency room for further evaluation. He is on 3 L oxygen and comfortable now  He denies chest pain cough sputum fever change in mental status nausea vomiting diarrhea or any urinary complaints. He feels his stomach is more and more distended. He has chronic venous stasis with edema and erythematous changes over lower extremities  He is a Jehovah witness       Brief Problem Based Course:     Acute on chronic diastolic congestive heart failure, appears decompensated DCHF mostly likely secondary to HTN heart disease.  - TTE on 22 with LV systolic function low normal with EF 50-55%. Grade 3 DD. Right ventricular systolic function is reduced. Aortic valve appears functionally bicuspid with severely calcified and fused left and non-coronary cusps. Moderate aortic stenosis and regurgitation.  - Repeat Echo on 24 with LVEF 50-54% with indeterminate relaxation. Septal flattening is noted consistent with RV volume/pressure overload. RV poorly visualized, but appears moderately dilated with reduced systolic function. Severely enlarged right atrium. Calcified aortic valve with moderate stenosis and mild regurgitation. Mild to moderate tricuspid valve regurgitation. Severe pulmonary hypertension.   - IV lasix gtt per nephro, increased to 10mg/hr on 24 per nephro recs  - Metolazone x 1

## 2024-05-13 ENCOUNTER — CLINICAL DOCUMENTATION (OUTPATIENT)
Dept: CASE MANAGEMENT | Age: 69
End: 2024-05-13

## 2024-05-13 NOTE — MANAGEMENT PLAN
Patient Management Plan              Pt. Name: Shay Townsend  : 1955           Date plan entered: 22        Patients Physicians:     Primary Care  : No primary care provider on file.  Contact #:    Specialists:    Contact #:                                                   Summary            Reason for Referral: This patient has been provided a management plan for Medical Needs                 Patient has had frequent visits for:  Yazdanism  One admission a month this year.  Always brings up an issue with a garcia being inserted when he refused.  Noncompliant with CHF, diet, etc.  Currently at [facility].     22  ED COURSE/MDM  Patient seen and evaluated. Old records reviewed. Labs and imaging reviewed and results discussed with patient.      67-year-old male presenting with signs and symptoms concerning for CHF exacerbation, volume overload.  Blood pressure in the 100 systolics, SPO2 in the low to mid 90s.  He has exam that is consistent with volume overload.  Chest x-ray with pulmonary edema.  Creatinine is stable at 1.9 however he is new uremia to 113.  No symptoms of GI bleed.  I spoke with on-call nephrology, Dr. Bryant, who recommended Lasix infusion as this has previously worked well for the patient.     Patient expressed frustration to nursing staff that his hospital bed was not immediately available.  He states that he has chronic back pain and if he is not able to get to his hospital room soon, he wants to go back to his nursing facility.  Myself and nursing staff explained to the patient that the hospital is full and we are holding patients in the emergency department.  He does have elevated troponin, exam consistent with volume overload and his symptoms were only improved with Lasix infusion and he definitely requires inpatient hospitalization.  Patient however does not want to listen to this and continues to want to leave AGAINST MEDICAL ADVICE.

## 2024-05-17 ENCOUNTER — APPOINTMENT (OUTPATIENT)
Dept: GENERAL RADIOLOGY | Age: 69
End: 2024-05-17
Payer: MEDICARE

## 2024-05-17 ENCOUNTER — HOSPITAL ENCOUNTER (INPATIENT)
Age: 69
LOS: 8 days | Discharge: NURSING, REHAB OR LONG-TERM CARE | End: 2024-05-25
Attending: EMERGENCY MEDICINE | Admitting: INTERNAL MEDICINE
Payer: MEDICARE

## 2024-05-17 DIAGNOSIS — M47.816 LUMBAR FACET ARTHROPATHY: Chronic | ICD-10-CM

## 2024-05-17 DIAGNOSIS — G62.9 NEUROPATHY: Chronic | ICD-10-CM

## 2024-05-17 DIAGNOSIS — R79.89 ELEVATED BRAIN NATRIURETIC PEPTIDE (BNP) LEVEL: ICD-10-CM

## 2024-05-17 DIAGNOSIS — R04.0 EPISTAXIS: ICD-10-CM

## 2024-05-17 DIAGNOSIS — R79.89 TROPONIN LEVEL ELEVATED: ICD-10-CM

## 2024-05-17 DIAGNOSIS — E87.70 HYPERVOLEMIA, UNSPECIFIED HYPERVOLEMIA TYPE: Primary | ICD-10-CM

## 2024-05-17 PROBLEM — I50.9 HEART FAILURE (HCC): Status: ACTIVE | Noted: 2024-05-17

## 2024-05-17 LAB
ANION GAP SERPL CALCULATED.3IONS-SCNC: 13 MMOL/L (ref 3–16)
BASOPHILS # BLD: 0 K/UL (ref 0–0.2)
BASOPHILS NFR BLD: 0.5 %
BUN SERPL-MCNC: 121 MG/DL (ref 7–20)
CALCIUM SERPL-MCNC: 8.8 MG/DL (ref 8.3–10.6)
CHLORIDE SERPL-SCNC: 93 MMOL/L (ref 99–110)
CO2 SERPL-SCNC: 23 MMOL/L (ref 21–32)
CREAT SERPL-MCNC: 2.2 MG/DL (ref 0.8–1.3)
DEPRECATED RDW RBC AUTO: 20.5 % (ref 12.4–15.4)
EKG ATRIAL RATE: 86 BPM
EKG DIAGNOSIS: NORMAL
EKG Q-T INTERVAL: 384 MS
EKG QRS DURATION: 88 MS
EKG QTC CALCULATION (BAZETT): 405 MS
EKG R AXIS: -67 DEGREES
EKG T AXIS: 250 DEGREES
EKG VENTRICULAR RATE: 67 BPM
EOSINOPHIL # BLD: 0.2 K/UL (ref 0–0.6)
EOSINOPHIL NFR BLD: 2.8 %
GFR SERPLBLD CREATININE-BSD FMLA CKD-EPI: 31 ML/MIN/{1.73_M2}
GLUCOSE BLD-MCNC: 297 MG/DL (ref 70–99)
GLUCOSE SERPL-MCNC: 315 MG/DL (ref 70–99)
HCT VFR BLD AUTO: 42.8 % (ref 40.5–52.5)
HGB BLD-MCNC: 13.6 G/DL (ref 13.5–17.5)
LYMPHOCYTES # BLD: 0.7 K/UL (ref 1–5.1)
LYMPHOCYTES NFR BLD: 9.4 %
MCH RBC QN AUTO: 28 PG (ref 26–34)
MCHC RBC AUTO-ENTMCNC: 31.9 G/DL (ref 31–36)
MCV RBC AUTO: 87.8 FL (ref 80–100)
MONOCYTES # BLD: 0.5 K/UL (ref 0–1.3)
MONOCYTES NFR BLD: 6.8 %
NEUTROPHILS # BLD: 5.8 K/UL (ref 1.7–7.7)
NEUTROPHILS NFR BLD: 80.5 %
NT-PROBNP SERPL-MCNC: 3359 PG/ML (ref 0–124)
PERFORMED ON: ABNORMAL
PLATELET # BLD AUTO: 147 K/UL (ref 135–450)
PMV BLD AUTO: 9.8 FL (ref 5–10.5)
POTASSIUM SERPL-SCNC: 4.6 MMOL/L (ref 3.5–5.1)
RBC # BLD AUTO: 4.87 M/UL (ref 4.2–5.9)
SODIUM SERPL-SCNC: 129 MMOL/L (ref 136–145)
TROPONIN, HIGH SENSITIVITY: 87 NG/L (ref 0–22)
TROPONIN, HIGH SENSITIVITY: 89 NG/L (ref 0–22)
WBC # BLD AUTO: 7.2 K/UL (ref 4–11)

## 2024-05-17 PROCEDURE — 84484 ASSAY OF TROPONIN QUANT: CPT

## 2024-05-17 PROCEDURE — 2060000000 HC ICU INTERMEDIATE R&B

## 2024-05-17 PROCEDURE — 96374 THER/PROPH/DIAG INJ IV PUSH: CPT

## 2024-05-17 PROCEDURE — 99285 EMERGENCY DEPT VISIT HI MDM: CPT

## 2024-05-17 PROCEDURE — 83880 ASSAY OF NATRIURETIC PEPTIDE: CPT

## 2024-05-17 PROCEDURE — 80048 BASIC METABOLIC PNL TOTAL CA: CPT

## 2024-05-17 PROCEDURE — 93005 ELECTROCARDIOGRAM TRACING: CPT | Performed by: PHYSICIAN ASSISTANT

## 2024-05-17 PROCEDURE — 85025 COMPLETE CBC W/AUTO DIFF WBC: CPT

## 2024-05-17 PROCEDURE — 71045 X-RAY EXAM CHEST 1 VIEW: CPT

## 2024-05-17 PROCEDURE — 2700000000 HC OXYGEN THERAPY PER DAY

## 2024-05-17 PROCEDURE — 94761 N-INVAS EAR/PLS OXIMETRY MLT: CPT

## 2024-05-17 PROCEDURE — 6360000002 HC RX W HCPCS: Performed by: EMERGENCY MEDICINE

## 2024-05-17 PROCEDURE — 93010 ELECTROCARDIOGRAM REPORT: CPT | Performed by: INTERNAL MEDICINE

## 2024-05-17 RX ORDER — INSULIN GLARGINE 100 [IU]/ML
45 INJECTION, SOLUTION SUBCUTANEOUS NIGHTLY
Status: DISCONTINUED | OUTPATIENT
Start: 2024-05-18 | End: 2024-05-19

## 2024-05-17 RX ORDER — SODIUM CHLORIDE 0.9 % (FLUSH) 0.9 %
5-40 SYRINGE (ML) INJECTION EVERY 12 HOURS SCHEDULED
Status: DISCONTINUED | OUTPATIENT
Start: 2024-05-17 | End: 2024-05-25 | Stop reason: HOSPADM

## 2024-05-17 RX ORDER — ACETAMINOPHEN 650 MG/1
650 SUPPOSITORY RECTAL EVERY 6 HOURS PRN
Status: DISCONTINUED | OUTPATIENT
Start: 2024-05-17 | End: 2024-05-25 | Stop reason: HOSPADM

## 2024-05-17 RX ORDER — INSULIN LISPRO 100 [IU]/ML
0-4 INJECTION, SOLUTION INTRAVENOUS; SUBCUTANEOUS
Status: DISCONTINUED | OUTPATIENT
Start: 2024-05-18 | End: 2024-05-19

## 2024-05-17 RX ORDER — SODIUM CHLORIDE 0.9 % (FLUSH) 0.9 %
5-40 SYRINGE (ML) INJECTION PRN
Status: DISCONTINUED | OUTPATIENT
Start: 2024-05-17 | End: 2024-05-25 | Stop reason: HOSPADM

## 2024-05-17 RX ORDER — ENOXAPARIN SODIUM 100 MG/ML
30 INJECTION SUBCUTANEOUS 2 TIMES DAILY
Status: DISCONTINUED | OUTPATIENT
Start: 2024-05-17 | End: 2024-05-20

## 2024-05-17 RX ORDER — MAGNESIUM SULFATE IN WATER 40 MG/ML
2000 INJECTION, SOLUTION INTRAVENOUS PRN
Status: DISCONTINUED | OUTPATIENT
Start: 2024-05-17 | End: 2024-05-25 | Stop reason: HOSPADM

## 2024-05-17 RX ORDER — POTASSIUM CHLORIDE 20 MEQ/1
40 TABLET, EXTENDED RELEASE ORAL PRN
Status: DISCONTINUED | OUTPATIENT
Start: 2024-05-17 | End: 2024-05-18

## 2024-05-17 RX ORDER — FUROSEMIDE 10 MG/ML
40 INJECTION INTRAMUSCULAR; INTRAVENOUS ONCE
Status: COMPLETED | OUTPATIENT
Start: 2024-05-17 | End: 2024-05-17

## 2024-05-17 RX ORDER — POTASSIUM CHLORIDE 7.45 MG/ML
10 INJECTION INTRAVENOUS PRN
Status: DISCONTINUED | OUTPATIENT
Start: 2024-05-17 | End: 2024-05-17 | Stop reason: SDUPTHER

## 2024-05-17 RX ORDER — ACETAMINOPHEN 325 MG/1
650 TABLET ORAL EVERY 6 HOURS PRN
Status: DISCONTINUED | OUTPATIENT
Start: 2024-05-17 | End: 2024-05-25 | Stop reason: HOSPADM

## 2024-05-17 RX ORDER — FUROSEMIDE 10 MG/ML
40 INJECTION INTRAMUSCULAR; INTRAVENOUS 3 TIMES DAILY
Status: DISCONTINUED | OUTPATIENT
Start: 2024-05-18 | End: 2024-05-18

## 2024-05-17 RX ORDER — DEXTROSE MONOHYDRATE 100 MG/ML
INJECTION, SOLUTION INTRAVENOUS CONTINUOUS PRN
Status: DISCONTINUED | OUTPATIENT
Start: 2024-05-17 | End: 2024-05-25 | Stop reason: HOSPADM

## 2024-05-17 RX ORDER — ONDANSETRON 2 MG/ML
4 INJECTION INTRAMUSCULAR; INTRAVENOUS EVERY 6 HOURS PRN
Status: DISCONTINUED | OUTPATIENT
Start: 2024-05-17 | End: 2024-05-25 | Stop reason: HOSPADM

## 2024-05-17 RX ORDER — POTASSIUM CHLORIDE 7.45 MG/ML
10 INJECTION INTRAVENOUS PRN
Status: DISCONTINUED | OUTPATIENT
Start: 2024-05-17 | End: 2024-05-18

## 2024-05-17 RX ORDER — SODIUM CHLORIDE 9 MG/ML
INJECTION, SOLUTION INTRAVENOUS PRN
Status: DISCONTINUED | OUTPATIENT
Start: 2024-05-17 | End: 2024-05-25 | Stop reason: HOSPADM

## 2024-05-17 RX ORDER — INSULIN LISPRO 100 [IU]/ML
0-4 INJECTION, SOLUTION INTRAVENOUS; SUBCUTANEOUS NIGHTLY
Status: DISCONTINUED | OUTPATIENT
Start: 2024-05-18 | End: 2024-05-19

## 2024-05-17 RX ORDER — GLUCAGON 1 MG/ML
1 KIT INJECTION PRN
Status: DISCONTINUED | OUTPATIENT
Start: 2024-05-17 | End: 2024-05-25 | Stop reason: HOSPADM

## 2024-05-17 RX ORDER — POLYETHYLENE GLYCOL 3350 17 G/17G
17 POWDER, FOR SOLUTION ORAL DAILY PRN
Status: DISCONTINUED | OUTPATIENT
Start: 2024-05-17 | End: 2024-05-25 | Stop reason: HOSPADM

## 2024-05-17 RX ORDER — ONDANSETRON 4 MG/1
4 TABLET, ORALLY DISINTEGRATING ORAL EVERY 8 HOURS PRN
Status: DISCONTINUED | OUTPATIENT
Start: 2024-05-17 | End: 2024-05-25 | Stop reason: HOSPADM

## 2024-05-17 RX ADMIN — FUROSEMIDE 40 MG: 10 INJECTION, SOLUTION INTRAMUSCULAR; INTRAVENOUS at 19:40

## 2024-05-17 ASSESSMENT — PAIN - FUNCTIONAL ASSESSMENT: PAIN_FUNCTIONAL_ASSESSMENT: 0-10

## 2024-05-17 ASSESSMENT — PAIN SCALES - GENERAL
PAINLEVEL_OUTOF10: 7
PAINLEVEL_OUTOF10: 7

## 2024-05-17 NOTE — H&P
Hospital Medicine History & Physical      Patient Name: Shay Townsend    : 1955    PCP: Julia Stein MD    Date of Service:  Patient seen and examined on 24     Chief Complaint:  fluid overloaded    History Of Present Illness:    Shay Townsend is a 69 y.o. male with a PMH of CKD diastolic CHF CAD CABG diabetes hypertension and hyperlipidemia who presented to ED with complaint of fluid overload      Of note patient was admitted  to 5/10 for acute on chronic diastolic heart failure with associated hypoxic respiratory failure.  Was seen in consult by nephrology and cardiology.    Patient presents to the ED from nursing home due to volume overload.  Patient endorses that he has gained 20 pounds since discharge on 5/10/2024.  Associated increased lower extremity edema, abdominal distention with shortness of breath on exertion.  States that torsemide not adequately diuresing him currently.    Blood pressure 105/57 pulse 64 saturating 95% on 3 L via NC.  Labs show sodium of 129, chloride of 93, BUN of 121, creatinine of 2.2, glucose of 315, proBNP of 3359, troponin of 89.  WBC of 7.2 hemoglobin of 13.6.    Chest imaging, chest x-ray shows mild vascular congestion.  EKG shows atrial fibrillation, with left axis deviation, with indeterminate ischemic changes.    In the ED patient received 4 mg IV of Lasix.  Nephrology was consulted.    Past Medical History:    Patient has a past medical history of Anemia, Angina, Arthritis, CAD (coronary artery disease), CHF (congestive heart failure) ([facility]), CKD (chronic kidney disease) stage 3, GFR 30-59 ml/min ([facility]), Clostridium difficile diarrhea, Diabetes mellitus ([facility]), Diabetic neuropathy ([facility]), Disease of blood and blood forming organ, Dizziness, GERD (gastroesophageal reflux disease), Headache, Hearing loss, Hyperlipidemia, Hypertension, Kidney stone, Refusal of blood transfusions as patient is Yazidi, Sleep apnea, Tinnitus, Venous

## 2024-05-17 NOTE — ED PROVIDER NOTES
[facility]  ED     EMERGENCY DEPARTMENT ENCOUNTER            Pt Name: Shay Townsend   MRN: [id number]   Birthdate 1955   Date of evaluation: 5/17/2024   Provider: Ronnell Duarte MD   PCP: Julia Stein MD   Note Started: 6:38 PM EDT 5/17/24          CHIEF COMPLAINT     Chief Complaint   Patient presents with    Congestive Heart Failure     Pt is CHF non compliant.  Per facility pt has gained approx 20lbs in 10 days.  Takes diuretic and PRN diuretic.  Pt has management plan due to non compliance.  On 3L prn at  SNF.  Pt will always refuse garcia for output management.               HISTORY OF PRESENT ILLNESS:   History from : Patient   Limitations to history : None     Shay Townsend is a 69 y.o. male who presents complaining of weight gain and shortness of breath.  This patient has a history of heart failure.  He has a history of chronically elevated troponin.  He has frequent visits for CHF exacerbation and volume overload.  Patient also has chronic back pain.  He also has a history of coronary artery disease, chronic kidney disease, etc.  The patient states he is gaining 20 pounds in the last 10 days.  While he has a history of medication noncompliance he reports to me that he has been compliant with all of his medications.  He states despite this he has been gaining weight and having more shortness of breath.'s mild orthopnea    Nursing Notes were all reviewed and agreed with, or any disagreements were addressed in the HPI.     REVIEW OF SYSTEMS :    Review of Systems   Constitutional:  Positive for unexpected weight change. Negative for fever.   HENT:  Negative for rhinorrhea and sore throat.    Eyes:  Negative for redness.   Respiratory:  Positive for shortness of breath.    Cardiovascular:  Positive for leg swelling. Negative for chest pain.   Gastrointestinal:  Negative for abdominal pain.   Genitourinary:  Negative for flank pain.   Neurological:  Negative for

## 2024-05-18 LAB
ANION GAP SERPL CALCULATED.3IONS-SCNC: 14 MMOL/L (ref 3–16)
BUN SERPL-MCNC: 119 MG/DL (ref 7–20)
CALCIUM SERPL-MCNC: 8.6 MG/DL (ref 8.3–10.6)
CHLORIDE SERPL-SCNC: 96 MMOL/L (ref 99–110)
CHOLEST SERPL-MCNC: 79 MG/DL (ref 0–199)
CO2 SERPL-SCNC: 23 MMOL/L (ref 21–32)
CREAT SERPL-MCNC: 2.1 MG/DL (ref 0.8–1.3)
GFR SERPLBLD CREATININE-BSD FMLA CKD-EPI: 33 ML/MIN/{1.73_M2}
GLUCOSE BLD-MCNC: 199 MG/DL (ref 70–99)
GLUCOSE BLD-MCNC: 236 MG/DL (ref 70–99)
GLUCOSE BLD-MCNC: 257 MG/DL (ref 70–99)
GLUCOSE BLD-MCNC: 310 MG/DL (ref 70–99)
GLUCOSE SERPL-MCNC: 273 MG/DL (ref 70–99)
HDLC SERPL-MCNC: 35 MG/DL (ref 40–60)
LDLC SERPL CALC-MCNC: 29 MG/DL
MAGNESIUM SERPL-MCNC: 2.9 MG/DL (ref 1.8–2.4)
PERFORMED ON: ABNORMAL
POTASSIUM SERPL-SCNC: 4 MMOL/L (ref 3.5–5.1)
SODIUM SERPL-SCNC: 133 MMOL/L (ref 136–145)
TRIGL SERPL-MCNC: 77 MG/DL (ref 0–150)
VLDLC SERPL CALC-MCNC: 15 MG/DL

## 2024-05-18 PROCEDURE — 94761 N-INVAS EAR/PLS OXIMETRY MLT: CPT

## 2024-05-18 PROCEDURE — 6370000000 HC RX 637 (ALT 250 FOR IP): Performed by: INTERNAL MEDICINE

## 2024-05-18 PROCEDURE — 2700000000 HC OXYGEN THERAPY PER DAY

## 2024-05-18 PROCEDURE — 6370000000 HC RX 637 (ALT 250 FOR IP): Performed by: NURSE PRACTITIONER

## 2024-05-18 PROCEDURE — 6360000002 HC RX W HCPCS: Performed by: INTERNAL MEDICINE

## 2024-05-18 PROCEDURE — 51702 INSERT TEMP BLADDER CATH: CPT

## 2024-05-18 PROCEDURE — 36415 COLL VENOUS BLD VENIPUNCTURE: CPT

## 2024-05-18 PROCEDURE — 2060000000 HC ICU INTERMEDIATE R&B

## 2024-05-18 PROCEDURE — 80061 LIPID PANEL: CPT

## 2024-05-18 PROCEDURE — 99223 1ST HOSP IP/OBS HIGH 75: CPT | Performed by: INTERNAL MEDICINE

## 2024-05-18 PROCEDURE — 83735 ASSAY OF MAGNESIUM: CPT

## 2024-05-18 PROCEDURE — 2580000003 HC RX 258: Performed by: INTERNAL MEDICINE

## 2024-05-18 PROCEDURE — P9047 ALBUMIN (HUMAN), 25%, 50ML: HCPCS | Performed by: INTERNAL MEDICINE

## 2024-05-18 PROCEDURE — 80048 BASIC METABOLIC PNL TOTAL CA: CPT

## 2024-05-18 RX ORDER — BRIMONIDINE TARTRATE AND TIMOLOL MALEATE 2; 5 MG/ML; MG/ML
1 SOLUTION OPHTHALMIC 2 TIMES DAILY
Status: DISCONTINUED | OUTPATIENT
Start: 2024-05-18 | End: 2024-05-18 | Stop reason: SDUPTHER

## 2024-05-18 RX ORDER — ATORVASTATIN CALCIUM 80 MG/1
80 TABLET, FILM COATED ORAL NIGHTLY
Status: DISCONTINUED | OUTPATIENT
Start: 2024-05-18 | End: 2024-05-25 | Stop reason: HOSPADM

## 2024-05-18 RX ORDER — NICOTINE POLACRILEX 4 MG
15 LOZENGE BUCCAL SEE ADMIN INSTRUCTIONS
COMMUNITY

## 2024-05-18 RX ORDER — BRIMONIDINE TARTRATE 2 MG/ML
1 SOLUTION/ DROPS OPHTHALMIC 2 TIMES DAILY
Status: DISCONTINUED | OUTPATIENT
Start: 2024-05-18 | End: 2024-05-25 | Stop reason: HOSPADM

## 2024-05-18 RX ORDER — TIMOLOL MALEATE 5 MG/ML
1 SOLUTION/ DROPS OPHTHALMIC 2 TIMES DAILY
Status: DISCONTINUED | OUTPATIENT
Start: 2024-05-18 | End: 2024-05-25 | Stop reason: HOSPADM

## 2024-05-18 RX ORDER — OXYCODONE HYDROCHLORIDE AND ACETAMINOPHEN 5; 325 MG/1; MG/1
1 TABLET ORAL EVERY 6 HOURS PRN
Status: DISCONTINUED | OUTPATIENT
Start: 2024-05-18 | End: 2024-05-23

## 2024-05-18 RX ORDER — DOCUSATE SODIUM 100 MG/1
100 CAPSULE, LIQUID FILLED ORAL 2 TIMES DAILY
Status: DISCONTINUED | OUTPATIENT
Start: 2024-05-18 | End: 2024-05-25 | Stop reason: HOSPADM

## 2024-05-18 RX ORDER — LIDOCAINE HYDROCHLORIDE 20 MG/ML
JELLY TOPICAL PRN
Status: DISCONTINUED | OUTPATIENT
Start: 2024-05-18 | End: 2024-05-25 | Stop reason: HOSPADM

## 2024-05-18 RX ORDER — ASPIRIN 81 MG/1
81 TABLET ORAL DAILY
Status: DISCONTINUED | OUTPATIENT
Start: 2024-05-18 | End: 2024-05-25 | Stop reason: HOSPADM

## 2024-05-18 RX ORDER — FUROSEMIDE 10 MG/ML
80 INJECTION INTRAMUSCULAR; INTRAVENOUS 3 TIMES DAILY
Status: DISCONTINUED | OUTPATIENT
Start: 2024-05-18 | End: 2024-05-23

## 2024-05-18 RX ORDER — SENNOSIDES A AND B 8.6 MG/1
1 TABLET, FILM COATED ORAL 2 TIMES DAILY
Status: DISCONTINUED | OUTPATIENT
Start: 2024-05-18 | End: 2024-05-25 | Stop reason: HOSPADM

## 2024-05-18 RX ORDER — DORZOLAMIDE HCL 20 MG/ML
1 SOLUTION/ DROPS OPHTHALMIC 2 TIMES DAILY
Status: DISCONTINUED | OUTPATIENT
Start: 2024-05-18 | End: 2024-05-18 | Stop reason: SDUPTHER

## 2024-05-18 RX ORDER — TAMSULOSIN HYDROCHLORIDE 0.4 MG/1
0.8 CAPSULE ORAL DAILY
Status: DISCONTINUED | OUTPATIENT
Start: 2024-05-18 | End: 2024-05-25 | Stop reason: HOSPADM

## 2024-05-18 RX ORDER — DORZOLAMIDE HCL 20 MG/ML
1 SOLUTION/ DROPS OPHTHALMIC 2 TIMES DAILY
Status: DISCONTINUED | OUTPATIENT
Start: 2024-05-18 | End: 2024-05-25 | Stop reason: HOSPADM

## 2024-05-18 RX ORDER — ALBUMIN (HUMAN) 12.5 G/50ML
25 SOLUTION INTRAVENOUS EVERY 8 HOURS
Status: COMPLETED | OUTPATIENT
Start: 2024-05-18 | End: 2024-05-20

## 2024-05-18 RX ORDER — CARVEDILOL 3.12 MG/1
3.12 TABLET ORAL 2 TIMES DAILY WITH MEALS
Status: DISCONTINUED | OUTPATIENT
Start: 2024-05-18 | End: 2024-05-24

## 2024-05-18 RX ORDER — MIDODRINE HYDROCHLORIDE 5 MG/1
10 TABLET ORAL
Status: DISCONTINUED | OUTPATIENT
Start: 2024-05-18 | End: 2024-05-25 | Stop reason: HOSPADM

## 2024-05-18 RX ORDER — PREGABALIN 25 MG/1
50 CAPSULE ORAL 2 TIMES DAILY
Status: DISCONTINUED | OUTPATIENT
Start: 2024-05-18 | End: 2024-05-19

## 2024-05-18 RX ORDER — METOLAZONE 2.5 MG/1
5 TABLET ORAL ONCE
Status: COMPLETED | OUTPATIENT
Start: 2024-05-18 | End: 2024-05-18

## 2024-05-18 RX ORDER — ALLOPURINOL 300 MG/1
300 TABLET ORAL DAILY
Status: DISCONTINUED | OUTPATIENT
Start: 2024-05-18 | End: 2024-05-23

## 2024-05-18 RX ORDER — CARBOXYMETHYLCELLULOSE SODIUM 10 MG/ML
1 GEL OPHTHALMIC 3 TIMES DAILY
Status: DISCONTINUED | OUTPATIENT
Start: 2024-05-18 | End: 2024-05-25 | Stop reason: HOSPADM

## 2024-05-18 RX ORDER — PANTOPRAZOLE SODIUM 40 MG/1
40 TABLET, DELAYED RELEASE ORAL DAILY
Status: DISCONTINUED | OUTPATIENT
Start: 2024-05-18 | End: 2024-05-25 | Stop reason: HOSPADM

## 2024-05-18 RX ADMIN — ALLOPURINOL 300 MG: 300 TABLET ORAL at 13:06

## 2024-05-18 RX ADMIN — OXYCODONE AND ACETAMINOPHEN 1 TABLET: 5; 325 TABLET ORAL at 03:08

## 2024-05-18 RX ADMIN — TIMOLOL MALEATE 1 DROP: 5 SOLUTION OPHTHALMIC at 03:05

## 2024-05-18 RX ADMIN — INSULIN LISPRO 4 UNITS: 100 INJECTION, SOLUTION INTRAVENOUS; SUBCUTANEOUS at 17:23

## 2024-05-18 RX ADMIN — SODIUM CHLORIDE, PRESERVATIVE FREE 10 ML: 5 INJECTION INTRAVENOUS at 03:48

## 2024-05-18 RX ADMIN — CARVEDILOL 3.12 MG: 3.12 TABLET, FILM COATED ORAL at 16:20

## 2024-05-18 RX ADMIN — ALBUMIN (HUMAN) 25 G: 0.25 INJECTION, SOLUTION INTRAVENOUS at 22:14

## 2024-05-18 RX ADMIN — BRIMONIDINE TARTRATE 1 DROP: 2 SOLUTION OPHTHALMIC at 22:15

## 2024-05-18 RX ADMIN — ENOXAPARIN SODIUM 30 MG: 100 INJECTION SUBCUTANEOUS at 00:24

## 2024-05-18 RX ADMIN — SODIUM CHLORIDE, PRESERVATIVE FREE 10 ML: 5 INJECTION INTRAVENOUS at 09:21

## 2024-05-18 RX ADMIN — BRIMONIDINE TARTRATE 1 DROP: 2 SOLUTION OPHTHALMIC at 10:27

## 2024-05-18 RX ADMIN — ATORVASTATIN CALCIUM 80 MG: 80 TABLET, FILM COATED ORAL at 22:05

## 2024-05-18 RX ADMIN — Medication 10 ML: at 03:50

## 2024-05-18 RX ADMIN — SENNOSIDES 8.6 MG: 8.6 TABLET, FILM COATED ORAL at 22:05

## 2024-05-18 RX ADMIN — MIDODRINE HYDROCHLORIDE 10 MG: 5 TABLET ORAL at 16:19

## 2024-05-18 RX ADMIN — TAMSULOSIN HYDROCHLORIDE 0.8 MG: 0.4 CAPSULE ORAL at 13:06

## 2024-05-18 RX ADMIN — SENNOSIDES 8.6 MG: 8.6 TABLET, FILM COATED ORAL at 13:06

## 2024-05-18 RX ADMIN — DORZOLAMIDE HYDROCHLORIDE 1 DROP: 20 SOLUTION/ DROPS OPHTHALMIC at 09:25

## 2024-05-18 RX ADMIN — DORZOLAMIDE HYDROCHLORIDE 1 DROP: 20 SOLUTION/ DROPS OPHTHALMIC at 03:30

## 2024-05-18 RX ADMIN — BRIMONIDINE TARTRATE 1 DROP: 2 SOLUTION OPHTHALMIC at 03:32

## 2024-05-18 RX ADMIN — FUROSEMIDE 80 MG: 10 INJECTION, SOLUTION INTRAMUSCULAR; INTRAVENOUS at 22:04

## 2024-05-18 RX ADMIN — TIMOLOL MALEATE 1 DROP: 5 SOLUTION OPHTHALMIC at 10:29

## 2024-05-18 RX ADMIN — DOCUSATE SODIUM 100 MG: 100 CAPSULE, LIQUID FILLED ORAL at 13:08

## 2024-05-18 RX ADMIN — ALBUMIN (HUMAN) 25 G: 0.25 INJECTION, SOLUTION INTRAVENOUS at 11:25

## 2024-05-18 RX ADMIN — INSULIN LISPRO 4 UNITS: 100 INJECTION, SOLUTION INTRAVENOUS; SUBCUTANEOUS at 11:31

## 2024-05-18 RX ADMIN — FUROSEMIDE 80 MG: 10 INJECTION, SOLUTION INTRAMUSCULAR; INTRAVENOUS at 14:24

## 2024-05-18 RX ADMIN — TIMOLOL MALEATE 1 DROP: 5 SOLUTION OPHTHALMIC at 22:15

## 2024-05-18 RX ADMIN — ENOXAPARIN SODIUM 30 MG: 100 INJECTION SUBCUTANEOUS at 22:04

## 2024-05-18 RX ADMIN — DORZOLAMIDE HYDROCHLORIDE 1 DROP: 20 SOLUTION/ DROPS OPHTHALMIC at 22:15

## 2024-05-18 RX ADMIN — CARBOXYMETHYLCELLULOSE SODIUM 1 DROP: 10 GEL OPHTHALMIC at 13:07

## 2024-05-18 RX ADMIN — CARBOXYMETHYLCELLULOSE SODIUM 1 DROP: 10 GEL OPHTHALMIC at 22:05

## 2024-05-18 RX ADMIN — DOCUSATE SODIUM 100 MG: 100 CAPSULE, LIQUID FILLED ORAL at 22:05

## 2024-05-18 RX ADMIN — MIDODRINE HYDROCHLORIDE 10 MG: 5 TABLET ORAL at 11:26

## 2024-05-18 RX ADMIN — PANTOPRAZOLE SODIUM 40 MG: 40 TABLET, DELAYED RELEASE ORAL at 13:06

## 2024-05-18 RX ADMIN — ENOXAPARIN SODIUM 30 MG: 100 INJECTION SUBCUTANEOUS at 09:20

## 2024-05-18 RX ADMIN — ASPIRIN 81 MG: 81 TABLET, COATED ORAL at 13:06

## 2024-05-18 RX ADMIN — METOLAZONE 5 MG: 2.5 TABLET ORAL at 11:26

## 2024-05-18 RX ADMIN — INSULIN GLARGINE 45 UNITS: 100 INJECTION, SOLUTION SUBCUTANEOUS at 00:24

## 2024-05-18 RX ADMIN — LIDOCAINE HYDROCHLORIDE: 20 JELLY TOPICAL at 14:25

## 2024-05-18 RX ADMIN — SODIUM CHLORIDE, PRESERVATIVE FREE 10 ML: 5 INJECTION INTRAVENOUS at 22:05

## 2024-05-18 RX ADMIN — PREGABALIN 50 MG: 25 CAPSULE ORAL at 22:04

## 2024-05-18 RX ADMIN — INSULIN LISPRO 4 UNITS: 100 INJECTION, SOLUTION INTRAVENOUS; SUBCUTANEOUS at 22:04

## 2024-05-18 RX ADMIN — INSULIN GLARGINE 45 UNITS: 100 INJECTION, SOLUTION SUBCUTANEOUS at 22:04

## 2024-05-18 ASSESSMENT — PAIN SCALES - GENERAL
PAINLEVEL_OUTOF10: 7
PAINLEVEL_OUTOF10: 6
PAINLEVEL_OUTOF10: 3
PAINLEVEL_OUTOF10: 0
PAINLEVEL_OUTOF10: 3
PAINLEVEL_OUTOF10: 0
PAINLEVEL_OUTOF10: 0
PAINLEVEL_OUTOF10: 7

## 2024-05-18 ASSESSMENT — PAIN DESCRIPTION - DESCRIPTORS
DESCRIPTORS: ACHING
DESCRIPTORS: THROBBING
DESCRIPTORS: THROBBING

## 2024-05-18 ASSESSMENT — PAIN DESCRIPTION - FREQUENCY: FREQUENCY: CONTINUOUS

## 2024-05-18 ASSESSMENT — PAIN DESCRIPTION - LOCATION
LOCATION: BACK;FOOT
LOCATION: BACK;LEG
LOCATION: BACK;FOOT
LOCATION: GENERALIZED

## 2024-05-18 ASSESSMENT — PAIN DESCRIPTION - PAIN TYPE: TYPE: CHRONIC PAIN

## 2024-05-18 ASSESSMENT — PAIN DESCRIPTION - ORIENTATION: ORIENTATION: RIGHT;LEFT

## 2024-05-18 ASSESSMENT — PAIN - FUNCTIONAL ASSESSMENT: PAIN_FUNCTIONAL_ASSESSMENT: ACTIVITIES ARE NOT PREVENTED

## 2024-05-18 NOTE — PROGRESS NOTES
Slightly pale face for ethnicity. Abdomen and ble are red/pili. Bruising noted on norris upper arms,very small superficial open area in abd pannus but no overt redness in abd pannus noted. Ble have scabs and blisters but no open areas or weeping noted.

## 2024-05-18 NOTE — PROGRESS NOTES
4 Eyes Skin Assessment     NAME:  Shay Townsend  YOB: 1955  MEDICAL RECORD NUMBER:  [id number]    The patient is being assessed for  Admission    I agree that at least one RN has performed a thorough Head to Toe Skin Assessment on the patient. ALL assessment sites listed below have been assessed.      Areas assessed by both nurses:    Head, Face, Ears, Shoulders, Back, Chest, Arms, Elbows, Hands, Sacrum. Buttock, Coccyx, Ischium, Legs. Feet and Heels, Under Medical Devices , and Other abdominal pannus        Does the Patient have a Wound? Yes wound(s) were present on assessment. LDA wound assessment was Initiated and completed by RN       John Prevention initiated by RN: No oncoming rn will initiate  Wound Care Orders initiated by RN: No    Pressure Injury (Stage 3,4, Unstageable, DTI, NWPT, and Complex wounds) if present, place Wound referral order by RN under : No    New Ostomies, if present place, Ostomy referral order under : No     Nurse 1 eSignature: Electronically signed by Sera Esteban RN on 5/18/24 at 1:40 PM EDT    **SHARE this note so that the co-signing nurse can place an eSignature**    Nurse 2 eSignature: Electronically signed by Loreta Landon RN on 5/18/24 at 5:43 PM EDT

## 2024-05-18 NOTE — PROGRESS NOTES
Patient admitted to room 447 from ed.  Patient oriented to room, call light, bed rails, phone, lights and bathroom.  Patient instructed about the schedule of the day including: vital sign frequency, lab draws, possible tests, frequency of MD and staff rounds, including RN/MD rounding together at bedside, daily weights, and I &O's.  Patient instructed about prescribed diet, how to use 8MENU, and television.   bed alarm in place, patient aware of placement and reason.   Telemetry box  in place, patient aware of placement and reason.  Bed locked, in lowest position, side rails up 2/4, call light within reach.  Will continue to monitor.

## 2024-05-18 NOTE — PLAN OF CARE
CHF Care Plan      Patient's EF (Ejection Fraction) is less than 40%    Heart Failure Medications:  Diuretics:: Furosemide and Metalozone    (One of the following REQUIRED for EF </= 40%/SYSTOLIC FAILURE but MAY be used in EF% >40%/DIASTOLIC FAILURE)        ACE:: None        ARB:: None         ARNI:: None    (Beta Blockers)  NON- Evidenced Based Beta Blocker (for EF% >40%/DIASTOLIC FAILURE): Other    Evidenced Based Beta Blocker::(REQUIRED for EF% <40%/SYSTOLIC FAILURE) Carvedilol- Coreg  ...................................................................................................................................................    Failed to redirect to the Timeline version of the [employer] SmartLink.      Patient's weights and intake/output reviewed    Daily Weight log at bedside, patient/family participation in use of log: \"yes    Patient's current weight today shows a difference of 0 lbs new patient than last documented weight.      Intake/Output Summary (Last 24 hours) at 5/18/2024 1727  Last data filed at 5/18/2024 1644  Gross per 24 hour   Intake 825 ml   Output 1200 ml   Net -375 ml       Education Booklet Provided: yes    Comorbidities Reviewed Yes    Patient has a past medical history of Anemia, Angina, Arthritis, Atrial fibrillation ([facility]), CAD (coronary artery disease), CHF (congestive heart failure) ([facility]), CKD (chronic kidney disease) stage 3, GFR 30-59 ml/min ([facility]), Clostridium difficile diarrhea, Diabetes mellitus ([facility]), Diabetic neuropathy ([facility]), Disease of blood and blood forming organ, Dizziness, GERD (gastroesophageal reflux disease), Headache, Hearing loss, Hyperlipidemia, Hypertension, Kidney stone, Refusal of blood transfusions as patient is Zoroastrianism, Sleep apnea, Tinnitus, Venous ulcer ([facility]), and Wound, open.     >>For CHF and Comorbidity documentation on Education Time and Topics, please see Education Tab      Pt resting in bed at this time on  3 L O2. Pt with complaints of shortness

## 2024-05-18 NOTE — CONSULTS
tremor    Gabapentin      Tremor at high dose    Nsaids      Gastric ulcer       Social History:  Reviewed.  reports that he quit smoking about 36 years ago. His smoking use included cigarettes. He started smoking about 52 years ago. He has a 16.0 pack-year smoking history. He has never used smokeless tobacco. He reports that he does not drink alcohol and does not use drugs.     Family History:  Reviewed. family history includes Cancer in his father; Diabetes in his brother and brother; Heart Disease in his father and mother.   No premature CAD.     Review of System:  All other systems reviewed except for that noted above. Pertinent negatives and positives are:     General: negative for fever, chills   Ophthalmic ROS: negative for - eye pain or loss of vision  ENT ROS: negative for - headaches, sore throat   Respiratory: negative for - cough, sputum  Cardiovascular: Reviewed in HPI  Gastrointestinal: negative for - abdominal pain, diarrhea, N/V  Hematology: negative for - bleeding, blood clots, bruising or jaundice  Genito-Urinary:  negative for - Dysuria or incontinence  Musculoskeletal: negative for - Joint swelling, muscle pain  Neurological: negative for - confusion, dizziness, headaches   Psychiatric: No anxiety, no depression.  Dermatological: negative for - rash    Physical Examination:  Vitals:    24 1032   BP: 123/78   Pulse: 76   Resp: 18   Temp:    SpO2:         Intake/Output Summary (Last 24 hours) at 2024 1226  Last data filed at 2024 0927  Gross per 24 hour   Intake 725 ml   Output 300 ml   Net 425 ml     In: 725 [P.O.:705; I.V.:20]  Out: 300    Wt Readings from Last 3 Encounters:   24 119.5 kg (263 lb 6 oz)   05/10/24 112.4 kg (247 lb 14.4 oz)   01/15/24 109.8 kg (242 lb)     Temp  Av.9 °F (36.6 °C)  Min: 97.5 °F (36.4 °C)  Max: 98.2 °F (36.8 °C)  Pulse  Av  Min: 48  Max: 76  BP  Min: 78/43  Max: 131/78  SpO2  Av.1 %  Min: 90 %  Max: 97 %    Telemetry: Atrial  DM2 (diabetes mellitus, type 2) ([facility]) 04/14/2015    Pulmonary hypertension due to left ventricular diastolic dysfunction ([facility]) 01/07/2013    Obesity 01/07/2013    S/P CABG (coronary artery bypass graft) 01/07/2013    Chronic diastolic heart failure ([facility]) 01/03/2013    Hyperkalemia 09/01/2012    Chronic kidney disease 09/01/2012    Essential hypertension 02/06/2012    Anemia of chronic disease 02/04/2012    DM (diabetes mellitus) ([facility]) 01/11/2012    Coronary artery disease involving native heart without angina pectoris 01/11/2012      Active Hospital Problems    Diagnosis Date Noted    Heart failure ([facility]) [I50.9] 05/17/2024     Mr. Townsend is a pleasant 69 year old male with a medical history significant significant for ischemic cardiomyopathy status post CABG (Dr. Brandt), permanent atrial fibrillation (declined OAC given Episcopalian beliefs), diabetes mellitus type II, hypertension, hyperlipidemia, chronic renal insufficiency, obstructive sleep apnea, history of subdural hematoma post fall, and heart failure with preserved ejection fraction who presents from home with acute on chronic heart failure with preserved ejection fraction exacerbation.      Problem List:  1. Acute on chronic heart failure with preseved ejection fraction.  2. Ischemic cardiomyopathy status post CABG.  3. Permanent atrial fibrillation ([id number] score 4).  4. Acute on chronic renal failure.    Assessment and Plan:  1. Acute on chronic heart failure with preseved ejection fraction.  Patient is a pleasant but unfortunate 69-year-old male with a medical history significant for ejection fraction, chronic kidney insufficiency, ischemic cardiomyopathy, permanent atrial fibrillation, hypertension, and obstructive sleep apnea who presents from home with acute on chronic heart failure exacerbation largely driven by his renal insufficiency.  Nephrology on board.  Appreciate their assistance with diuresis.  Will allow him to adjust his diuretics.  Will

## 2024-05-18 NOTE — CONSULTS
Smokeless tobacco: Never    Tobacco comments:     22 yrs ago   Vaping Use    Vaping Use: Never used   Substance and Sexual Activity    Alcohol use: No     Alcohol/week: 0.0 standard drinks of alcohol    Drug use: No    Sexual activity: Not Currently     Social Determinants of Health     Food Insecurity: No Food Insecurity (5/2/2024)    Hunger Vital Sign     Worried About Running Out of Food in the Last Year: Never true     Ran Out of Food in the Last Year: Never true   Transportation Needs: No Transportation Needs (5/2/2024)    PRAPARE - Transportation     Lack of Transportation (Medical): No     Lack of Transportation (Non-Medical): No   Housing Stability: Low Risk  (5/2/2024)    Housing Stability Vital Sign     Unable to Pay for Housing in the Last Year: No     Number of Places Lived in the Last Year: 1     Unstable Housing in the Last Year: No       Physical Exam     Blood pressure 123/78, pulse 76, temperature 98.2 °F (36.8 °C), resp. rate 18, height 1.727 m (5' 8\"), weight 119.5 kg (263 lb 6 oz), SpO2 97 %.    General:  Ill appearing, somnolent   HEENT:  PERRL, EOMI  Neck:  Supple, normal range of movement  Chest:  On supplemental oxygen, no wheezing   CV:  Regular, no rub   Abdomen:  NTND, soft, +BS, no hepatosplenomegaly  Extremities:  ++ peripheral edema  Neurological:  Moving all four extremities, CN II-XII grossly intact, myoclonic jerks noted   Lymphatics:  No palpable lymph nodes  Skin:  No rash, no jaundice  Psychiatric:  Somnolent and confused     Data     Recent Labs     05/17/24  1841   WBC 7.2   HGB 13.6   HCT 42.8   MCV 87.8        Recent Labs     05/17/24  1841 05/18/24  0445   * 133*   K 4.6 4.0   CL 93* 96*   CO2 23 23   GLUCOSE 315* 273*   MG  --  2.90*   * 119*   CREATININE 2.2* 2.1*   LABGLOM 31* 33*       Assessment:    Chronic Kidney Disease:  Stage 3b  Etiology:  Cardiorenal syndrome  Scr is 1.8, stuck in a pattern of worsening labs to achieve effective diuresis with

## 2024-05-18 NOTE — CONSULTS
Consult Call Back    Acute on chronic diastolic heart failure    Who: Dr. Brooks  Date:5/18/2024,  Time:7:35 AM    Electronically signed by Myrtle May on 5/18/24 at 7:35 AM EDT

## 2024-05-18 NOTE — PLAN OF CARE
CHF Care Plan      Patient's EF (Ejection Fraction) is greater than 40%    Heart Failure Medications:  Diuretics:: Furosemide    (One of the following REQUIRED for EF </= 40%/SYSTOLIC FAILURE but MAY be used in EF% >40%/DIASTOLIC FAILURE)        ACE:: None        ARB:: None         ARNI:: None    (Beta Blockers)  NON- Evidenced Based Beta Blocker (for EF% >40%/DIASTOLIC FAILURE): None    Evidenced Based Beta Blocker::(REQUIRED for EF% <40%/SYSTOLIC FAILURE) Carvedilol- Coreg  ...................................................................................................................................................    Failed to redirect to the Timeline version of the [employer] SmartLink.      Patient's weights and intake/output reviewed    Daily Weight log at bedside, patient/family participation in use of log: \"yes    Patient's current weight today shows a difference of 1.5 lbs less than last documented weight.      Intake/Output Summary (Last 24 hours) at 5/18/2024 1358  Last data filed at 5/18/2024 1221  Gross per 24 hour   Intake 825 ml   Output 700 ml   Net 125 ml       Education Booklet Provided: yes    Comorbidities Reviewed Yes    Patient has a past medical history of Anemia, Angina, Arthritis, CAD (coronary artery disease), CHF (congestive heart failure) ([facility]), CKD (chronic kidney disease) stage 3, GFR 30-59 ml/min ([facility]), Clostridium difficile diarrhea, Diabetes mellitus ([facility]), Diabetic neuropathy ([facility]), Disease of blood and blood forming organ, Dizziness, GERD (gastroesophageal reflux disease), Headache, Hearing loss, Hyperlipidemia, Hypertension, Kidney stone, Refusal of blood transfusions as patient is Episcopalian, Sleep apnea, Tinnitus, Venous ulcer ([facility]), and Wound, open.     >>For CHF and Comorbidity documentation on Education Time and Topics, please see Education Tab      Pt resting in bed at this time on  4 L O2. Pt denies shortness of breath. Pt with nonpitting lower extremity edema.      Patient and/or Family's stated Goal of Care this Admission: increase activity tolerance prior to discharge        :

## 2024-05-18 NOTE — CONSULTS
Nutrition Education    Consult received for CHF diet education. Provided pt with verbal instruction on HF nutrition therapy. Discussed low sodium diet, daily weights, and fluid restriction. Pt voiced understanding. Reports being familiar with low sodium and fluid restriction. Reports having daily weights taken.  Denied need for handout d/t poor eyesight. No further questions.     Time spent: 5 minutes    Educated on HF nutrition therapy  Learners: Patient  Readiness: Acceptance  Method: Explanation and Handout  Response: Verbalizes Understanding  Contact name and number provided.    JESUS VELASCO RD  Contact Number: Office: [phone]; Joaquin: [phone]

## 2024-05-18 NOTE — ED NOTES
1933- called [facility]  Per Dr. Duarte  RE renal failure  2020- Dr. Mcmillan returned page

## 2024-05-18 NOTE — PROGRESS NOTES
Hospital Medicine Progress Note      Date of Admission: 5/17/2024  Hospital Day: 2    Chief Admission Complaint:   Volume overload     Subjective: He is sitting up at the edge of the bed.    Presenting Admission History:       69-year-old male  with a PMH of chronic kidney disease, diastolic congestive heart failure, CAD, CABG, diabetes mellitus type 2, hypertension, hyperlipidemia.    He does have history of cardiorenal syndrome, he has had multiple hospitalizations for decompensated heart failure despite being on high dose diuretics. EF slightly reduced but severe pulmonary hypertension with dilated right and left atria. Scr is 2.1. Recent admission and lost 20 lbs but gained it right back. Now 263 lbs with discharge weight of 247 lbs.    He was admitted to [facility] from 5/2 to 5/10/2024 for acute on chronic diastolic heart failure with associated hypoxic respiratory failure.  He was discharged to the F and over the past week he gained significant weight..  He was having increased lower extremity swelling, abdominal distention or some shortness of breath.  He was sent from the F back to the ED for further evaluation.  Chest x-ray did reveal vascular congestion, and he was admitted for further management and evaluation.    He is currently receiving IV Lasix, nephrology is also consulted.    Assessment/Plan:      Current Principal Problem:  Heart failure (HCC)    Acute on chronic diastolic heart failure with volume overload :   Of note patient was admitted from 5/2/--5/10 for similar condition.    Echo done 5/3/2024 showed EF of 50 to 54%, with severe pulmonary hypertension  Patient endorses 20 pound weight gain over the last week  Chest x-ray shows presence of mild vascular congestion.  Cardiology and nephrology been consulted.  Continue on IV Lasix 80 mg 3 times a day.  Follow volume status, check daily weights and I's and O's.  Nephrology did discuss with him that he may benefit from dialysis to    HGB 13.6   HCT 42.8        Recent Labs     05/17/24  1841 05/18/24  0445   * 133*   K 4.6 4.0   CL 93* 96*   CO2 23 23   * 119*   CREATININE 2.2* 2.1*   CALCIUM 8.8 8.6   MG  --  2.90*     Recent Labs     05/17/24  1841 05/17/24  1945   PROBNP 3,359*  --    TROPHS 89* 87*     No results for input(s): \"LABA1C\" in the last 72 hours.  No results for input(s): \"AST\", \"ALT\", \"BILIDIR\", \"BILITOT\", \"ALKPHOS\" in the last 72 hours.  No results for input(s): \"INR\", \"LACTA\", \"TSH\" in the last 72 hours.    Urine Cultures:   Lab Results   Component Value Date/Time    LABURIN  05/22/2018 02:30 PM     <10,000 CFU/ml mixed skin/urogenital deysi. No further workup     Blood Cultures:   Lab Results   Component Value Date/Time    BC No Growth after 4 days of incubation. 03/07/2022 04:30 PM     Lab Results   Component Value Date/Time    BLOODCULT2 No Growth after 4 days of incubation. 03/07/2022 04:34 PM     Organism:   Lab Results   Component Value Date/Time    ORG Pseudomonas aeruginosa 09/18/2020 03:23 PM    ORG Enterococcus faecalis 09/18/2020 03:23 PM         MEDINA OWEN MD

## 2024-05-19 LAB
ANION GAP SERPL CALCULATED.3IONS-SCNC: 14 MMOL/L (ref 3–16)
BUN SERPL-MCNC: 125 MG/DL (ref 7–20)
CALCIUM SERPL-MCNC: 9.2 MG/DL (ref 8.3–10.6)
CHLORIDE SERPL-SCNC: 94 MMOL/L (ref 99–110)
CO2 SERPL-SCNC: 25 MMOL/L (ref 21–32)
CREAT SERPL-MCNC: 2.2 MG/DL (ref 0.8–1.3)
DEPRECATED RDW RBC AUTO: 20.3 % (ref 12.4–15.4)
GFR SERPLBLD CREATININE-BSD FMLA CKD-EPI: 31 ML/MIN/{1.73_M2}
GLUCOSE BLD-MCNC: 208 MG/DL (ref 70–99)
GLUCOSE BLD-MCNC: 212 MG/DL (ref 70–99)
GLUCOSE BLD-MCNC: 219 MG/DL (ref 70–99)
GLUCOSE BLD-MCNC: 246 MG/DL (ref 70–99)
GLUCOSE SERPL-MCNC: 260 MG/DL (ref 70–99)
HCT VFR BLD AUTO: 40.4 % (ref 40.5–52.5)
HGB BLD-MCNC: 13 G/DL (ref 13.5–17.5)
MAGNESIUM SERPL-MCNC: 3 MG/DL (ref 1.8–2.4)
MCH RBC QN AUTO: 28 PG (ref 26–34)
MCHC RBC AUTO-ENTMCNC: 32.2 G/DL (ref 31–36)
MCV RBC AUTO: 86.8 FL (ref 80–100)
PERFORMED ON: ABNORMAL
PLATELET # BLD AUTO: 139 K/UL (ref 135–450)
PMV BLD AUTO: 9.4 FL (ref 5–10.5)
POTASSIUM SERPL-SCNC: 3.6 MMOL/L (ref 3.5–5.1)
RBC # BLD AUTO: 4.65 M/UL (ref 4.2–5.9)
SODIUM SERPL-SCNC: 133 MMOL/L (ref 136–145)
WBC # BLD AUTO: 6.9 K/UL (ref 4–11)

## 2024-05-19 PROCEDURE — 94761 N-INVAS EAR/PLS OXIMETRY MLT: CPT

## 2024-05-19 PROCEDURE — 36415 COLL VENOUS BLD VENIPUNCTURE: CPT

## 2024-05-19 PROCEDURE — 97166 OT EVAL MOD COMPLEX 45 MIN: CPT

## 2024-05-19 PROCEDURE — 97530 THERAPEUTIC ACTIVITIES: CPT

## 2024-05-19 PROCEDURE — 6370000000 HC RX 637 (ALT 250 FOR IP): Performed by: NURSE PRACTITIONER

## 2024-05-19 PROCEDURE — 2700000000 HC OXYGEN THERAPY PER DAY

## 2024-05-19 PROCEDURE — 6370000000 HC RX 637 (ALT 250 FOR IP): Performed by: INTERNAL MEDICINE

## 2024-05-19 PROCEDURE — 2060000000 HC ICU INTERMEDIATE R&B

## 2024-05-19 PROCEDURE — 97162 PT EVAL MOD COMPLEX 30 MIN: CPT

## 2024-05-19 PROCEDURE — 97110 THERAPEUTIC EXERCISES: CPT

## 2024-05-19 PROCEDURE — 6360000002 HC RX W HCPCS: Performed by: INTERNAL MEDICINE

## 2024-05-19 PROCEDURE — 83735 ASSAY OF MAGNESIUM: CPT

## 2024-05-19 PROCEDURE — 85027 COMPLETE CBC AUTOMATED: CPT

## 2024-05-19 PROCEDURE — P9047 ALBUMIN (HUMAN), 25%, 50ML: HCPCS | Performed by: INTERNAL MEDICINE

## 2024-05-19 PROCEDURE — 80048 BASIC METABOLIC PNL TOTAL CA: CPT

## 2024-05-19 PROCEDURE — 2580000003 HC RX 258: Performed by: INTERNAL MEDICINE

## 2024-05-19 RX ORDER — PREGABALIN 25 MG/1
50 CAPSULE ORAL DAILY
Status: DISCONTINUED | OUTPATIENT
Start: 2024-05-20 | End: 2024-05-25 | Stop reason: HOSPADM

## 2024-05-19 RX ORDER — INSULIN LISPRO 100 [IU]/ML
0-8 INJECTION, SOLUTION INTRAVENOUS; SUBCUTANEOUS
Status: DISCONTINUED | OUTPATIENT
Start: 2024-05-19 | End: 2024-05-25 | Stop reason: HOSPADM

## 2024-05-19 RX ORDER — INSULIN LISPRO 100 [IU]/ML
0-4 INJECTION, SOLUTION INTRAVENOUS; SUBCUTANEOUS NIGHTLY
Status: DISCONTINUED | OUTPATIENT
Start: 2024-05-19 | End: 2024-05-25 | Stop reason: HOSPADM

## 2024-05-19 RX ORDER — INSULIN GLARGINE 100 [IU]/ML
50 INJECTION, SOLUTION SUBCUTANEOUS NIGHTLY
Status: DISCONTINUED | OUTPATIENT
Start: 2024-05-19 | End: 2024-05-25 | Stop reason: HOSPADM

## 2024-05-19 RX ADMIN — SODIUM CHLORIDE, PRESERVATIVE FREE 10 ML: 5 INJECTION INTRAVENOUS at 08:15

## 2024-05-19 RX ADMIN — ALBUMIN (HUMAN) 25 G: 0.25 INJECTION, SOLUTION INTRAVENOUS at 04:42

## 2024-05-19 RX ADMIN — PANTOPRAZOLE SODIUM 40 MG: 40 TABLET, DELAYED RELEASE ORAL at 08:15

## 2024-05-19 RX ADMIN — SENNOSIDES 8.6 MG: 8.6 TABLET, FILM COATED ORAL at 20:43

## 2024-05-19 RX ADMIN — MIDODRINE HYDROCHLORIDE 10 MG: 5 TABLET ORAL at 08:14

## 2024-05-19 RX ADMIN — DOCUSATE SODIUM 100 MG: 100 CAPSULE, LIQUID FILLED ORAL at 20:43

## 2024-05-19 RX ADMIN — POLYETHYLENE GLYCOL 3350 17 G: 17 POWDER, FOR SOLUTION ORAL at 17:54

## 2024-05-19 RX ADMIN — FUROSEMIDE 80 MG: 10 INJECTION, SOLUTION INTRAMUSCULAR; INTRAVENOUS at 20:42

## 2024-05-19 RX ADMIN — INSULIN LISPRO 2 UNITS: 100 INJECTION, SOLUTION INTRAVENOUS; SUBCUTANEOUS at 16:49

## 2024-05-19 RX ADMIN — PREGABALIN 50 MG: 25 CAPSULE ORAL at 08:14

## 2024-05-19 RX ADMIN — MIDODRINE HYDROCHLORIDE 10 MG: 5 TABLET ORAL at 12:10

## 2024-05-19 RX ADMIN — MIDODRINE HYDROCHLORIDE 10 MG: 5 TABLET ORAL at 16:50

## 2024-05-19 RX ADMIN — CARBOXYMETHYLCELLULOSE SODIUM 1 DROP: 10 GEL OPHTHALMIC at 20:43

## 2024-05-19 RX ADMIN — ACETAMINOPHEN 650 MG: 325 TABLET ORAL at 08:14

## 2024-05-19 RX ADMIN — ALLOPURINOL 300 MG: 300 TABLET ORAL at 08:14

## 2024-05-19 RX ADMIN — ALBUMIN (HUMAN) 25 G: 0.25 INJECTION, SOLUTION INTRAVENOUS at 20:36

## 2024-05-19 RX ADMIN — ENOXAPARIN SODIUM 30 MG: 100 INJECTION SUBCUTANEOUS at 08:13

## 2024-05-19 RX ADMIN — TIMOLOL MALEATE 1 DROP: 5 SOLUTION OPHTHALMIC at 20:44

## 2024-05-19 RX ADMIN — CARBOXYMETHYLCELLULOSE SODIUM 1 DROP: 10 GEL OPHTHALMIC at 15:53

## 2024-05-19 RX ADMIN — FUROSEMIDE 80 MG: 10 INJECTION, SOLUTION INTRAMUSCULAR; INTRAVENOUS at 08:13

## 2024-05-19 RX ADMIN — DORZOLAMIDE HYDROCHLORIDE 1 DROP: 20 SOLUTION/ DROPS OPHTHALMIC at 20:43

## 2024-05-19 RX ADMIN — TAMSULOSIN HYDROCHLORIDE 0.8 MG: 0.4 CAPSULE ORAL at 06:02

## 2024-05-19 RX ADMIN — BRIMONIDINE TARTRATE 1 DROP: 2 SOLUTION OPHTHALMIC at 20:43

## 2024-05-19 RX ADMIN — SENNOSIDES 8.6 MG: 8.6 TABLET, FILM COATED ORAL at 08:15

## 2024-05-19 RX ADMIN — ATORVASTATIN CALCIUM 80 MG: 80 TABLET, FILM COATED ORAL at 20:43

## 2024-05-19 RX ADMIN — CARBOXYMETHYLCELLULOSE SODIUM 1 DROP: 10 GEL OPHTHALMIC at 08:18

## 2024-05-19 RX ADMIN — BRIMONIDINE TARTRATE 1 DROP: 2 SOLUTION OPHTHALMIC at 07:43

## 2024-05-19 RX ADMIN — CARVEDILOL 3.12 MG: 3.12 TABLET, FILM COATED ORAL at 08:18

## 2024-05-19 RX ADMIN — DORZOLAMIDE HYDROCHLORIDE 1 DROP: 20 SOLUTION/ DROPS OPHTHALMIC at 08:18

## 2024-05-19 RX ADMIN — CARVEDILOL 3.12 MG: 3.12 TABLET, FILM COATED ORAL at 16:50

## 2024-05-19 RX ADMIN — ALBUMIN (HUMAN) 25 G: 0.25 INJECTION, SOLUTION INTRAVENOUS at 10:53

## 2024-05-19 RX ADMIN — INSULIN LISPRO 1 UNITS: 100 INJECTION, SOLUTION INTRAVENOUS; SUBCUTANEOUS at 08:13

## 2024-05-19 RX ADMIN — INSULIN LISPRO 2 UNITS: 100 INJECTION, SOLUTION INTRAVENOUS; SUBCUTANEOUS at 12:10

## 2024-05-19 RX ADMIN — ASPIRIN 81 MG: 81 TABLET, COATED ORAL at 08:15

## 2024-05-19 RX ADMIN — ENOXAPARIN SODIUM 30 MG: 100 INJECTION SUBCUTANEOUS at 20:44

## 2024-05-19 RX ADMIN — DOCUSATE SODIUM 100 MG: 100 CAPSULE, LIQUID FILLED ORAL at 08:14

## 2024-05-19 RX ADMIN — FUROSEMIDE 80 MG: 10 INJECTION, SOLUTION INTRAMUSCULAR; INTRAVENOUS at 14:33

## 2024-05-19 RX ADMIN — TIMOLOL MALEATE 1 DROP: 5 SOLUTION OPHTHALMIC at 08:25

## 2024-05-19 RX ADMIN — INSULIN GLARGINE 50 UNITS: 100 INJECTION, SOLUTION SUBCUTANEOUS at 20:42

## 2024-05-19 ASSESSMENT — PAIN SCALES - GENERAL
PAINLEVEL_OUTOF10: 2
PAINLEVEL_OUTOF10: 0
PAINLEVEL_OUTOF10: 0
PAINLEVEL_OUTOF10: 4
PAINLEVEL_OUTOF10: 0
PAINLEVEL_OUTOF10: 0
PAINLEVEL_OUTOF10: 3
PAINLEVEL_OUTOF10: 3

## 2024-05-19 ASSESSMENT — PAIN DESCRIPTION - LOCATION
LOCATION: GENERALIZED
LOCATION: GENERALIZED

## 2024-05-19 ASSESSMENT — PAIN DESCRIPTION - ONSET: ONSET: ON-GOING

## 2024-05-19 ASSESSMENT — PAIN DESCRIPTION - DESCRIPTORS: DESCRIPTORS: ACHING;SORE

## 2024-05-19 NOTE — PLAN OF CARE
breath. Pt with pitting lower extremity edema.     Patient and/or Family's stated Goal of Care this Admission: reduce shortness of breath, increase activity tolerance, better understand heart failure and disease management, be more comfortable, and reduce lower extremity edema prior to discharge        :

## 2024-05-19 NOTE — PLAN OF CARE
Problem: Discharge Planning  Goal: Discharge to home or other facility with appropriate resources  Outcome: Progressing     Problem: Pain  Goal: Verbalizes/displays adequate comfort level or baseline comfort level  Outcome: Progressing     Problem: Skin/Tissue Integrity  Goal: Absence of new skin breakdown  Description: 1.  Monitor for areas of redness and/or skin breakdown  2.  Assess vascular access sites hourly  3.  Every 4-6 hours minimum:  Change oxygen saturation probe site  4.  Every 4-6 hours:  If on nasal continuous positive airway pressure, respiratory therapy assess nares and determine need for appliance change or resting period.  5/19/2024 0115 by Rita Rojo RN  Outcome: Progressing  5/18/2024 1727 by Loreta Landon RN  Outcome: Progressing     Problem: Safety - Adult  Goal: Free from fall injury  5/19/2024 0115 by Rita Rojo RN  Outcome: Progressing  5/18/2024 1727 by Loreta Landon, RN  Outcome: Progressing     Problem: ABCDS Injury Assessment  Goal: Absence of physical injury  5/19/2024 0115 by Rita Rojo RN  Outcome: Progressing  5/18/2024 1727 by Loreta Landon, RN  Outcome: Progressing

## 2024-05-19 NOTE — PROGRESS NOTES
Physical Therapy  [address]  Physical Therapy Initial Assessment    Name: Shay Townsend  : 1955  MRN: [id number]  Date of Service: 2024    Discharge Recommendations:  Subacute/Skilled Nursing Facility   PT Equipment Recommendations  Equipment Needed: No  Other: defer to next level of care.      Therapy discharge recommendations take into account each patient's current medical complexities and are subject to input/oversight from the patient's healthcare team.   Barriers to Home Discharge:   [] Steps to access home entry or bed/bath:   [x] Unable to transfer, ambulate, or propel wheelchair household distances without assist   [x] Unable to perform ADLs without assist   [x] Limited available assist at home upon discharge    [] Patient or family requests d/c to post-acute facility    [] Poor cognition/safety awareness for d/c to home alone    [] Unable to maintain ordered weight bearing status    [x] Patient with salient signs of long-standing immobility   [] Other:  pt at high risk for falls.    Patient Diagnosis(es): The primary encounter diagnosis was Hypervolemia, unspecified hypervolemia type. Diagnoses of Troponin level elevated and Elevated brain natriuretic peptide (BNP) level were also pertinent to this visit.  Past Medical History:  has a past medical history of Anemia, Angina, Arthritis, Atrial fibrillation (HCC), CAD (coronary artery disease), CHF (congestive heart failure) ([facility]), CKD (chronic kidney disease) stage 3, GFR 30-59 ml/min (HCC), Clostridium difficile diarrhea, Diabetes mellitus (HCC), Diabetic neuropathy (HCC), Disease of blood and blood forming organ, Dizziness, GERD (gastroesophageal reflux disease), Headache, Hearing loss, Hyperlipidemia, Hypertension, Kidney stone, Refusal of blood transfusions as patient is Hindu, Sleep apnea, Tinnitus, Venous ulcer (HCC), and Wound, open.  Past Surgical History:  has a past surgical history that includes  checking body weight daily. Barriers to completion of Daily weight check are identified with recommendations made or Patient demonstrates and/or verbalizes safety in:stepping up to weight self and complete downward gaze/head nod to read scale on standard scale  and safety stepping off scale.   5. Teach back of Elements of PT HF Education  Pt voices and demonstrates appropriate teach back of elements of Physical Therapy Heart Failure Education Program                        1)Heart Failure Zones Self Check Plan referencing Red/Yellow/Green Light Symbol with:  []Green Zone/All Clear:   physical activity is normal for you,   No new swelling in feet, ankles, legs or stomach,   No weight gain of more than 2-3 pounds,   No chest pain or worsening of shortness of breath.   (Continue daily: weight check, meds as directed, low salt eating, monitoring of fluid intake, balance activity, follow up visits)  []Yellow Zone/Caution:   Increased cough or shortness of breath with activity  Increased swelling in your feet, ankles, legs or stomach from baseline  Weight gain or loss of more than 2-3 pounds in 1 day.  Increase in the number of pillows needed while sleeping  (Check In!: You need to contact your doctor or provider as soon as possible)  [x]Red Zone/Medical Alert:  Unrelieved chest pain or shortness of breath, especially while resting  Increased Discomfort or swelling in the abdomen or lower body  Sudden weight gain of more than 5 pounds in a week  Increased cough with bubbly and/or pink sputum  (Warning: You need to be seen right away .  If you cannot reach your physician, call 911)    2)Alana Rating of Perceived Exertion (RPE) Scale     The Alana rating scale ranges from 6 to 20, where 6 means \"no exertion at all\" and 20 means \"maximal exertion.\" The patient chooses the number that best describes their level of exertion. This will objectify the intensity level of the patient's activity, and the therapist can use this

## 2024-05-19 NOTE — PROGRESS NOTES
Shift Summary    Admitting Dx:  SOB, 20# Weight Gain - CHF    Shift Events:  Patient did not sleep well overnight - frequently moving from lying down to sitting on the side of bed.  Recliner placed in room, patient rested better sitting up.  Encouraged keeping his feet elevated     Vitals:  Vitals:    05/18/24 1943 05/19/24 0001 05/19/24 0526 05/19/24 0559   BP: 131/74 (!) 120/55  127/67   Pulse: 85 63  71   Resp: 18 18 18   Temp: 98.1 °F (36.7 °C) 98.2 °F (36.8 °C)  98.1 °F (36.7 °C)   TempSrc: Oral Oral  Oral   SpO2: 90% 98%  91%   Weight:   119.7 kg (263 lb 14.4 oz)    Height:            Wt Readings from Last 3 Encounters:   05/19/24 119.7 kg (263 lb 14.4 oz)   05/10/24 112.4 kg (247 lb 14.4 oz)   01/15/24 109.8 kg (242 lb)        Tele:  SR with 1st Degree AVB, PACs    IV/Line:  R AC PIV   L FA PIV     Drains/Little:  Little - I/O    Neuro:   A&Ox4 - impulsive, forgetful at times    I/O:   I/O last 3 completed shifts:  In: 1065 [P.O.:945; I.V.:20; IV Piggyback:100]  Out: 1200 [Urine:1200]    I/O this shift:  In: 600 [P.O.:600]  Out: 1550 [Urine:1550]

## 2024-05-19 NOTE — PROGRESS NOTES
CHF Care Plan      Patient's EF (Ejection Fraction) is greater than 40%    Heart Failure Medications:  Diuretics:: Furosemide and Metalozone    (One of the following REQUIRED for EF </= 40%/SYSTOLIC FAILURE but MAY be used in EF% >40%/DIASTOLIC FAILURE)        ACE:: None        ARB:: None         ARNI:: None    (Beta Blockers)  NON- Evidenced Based Beta Blocker (for EF% >40%/DIASTOLIC FAILURE): None    Evidenced Based Beta Blocker::(REQUIRED for EF% <40%/SYSTOLIC FAILURE) Carvedilol- Coreg  ...................................................................................................................................................    Failed to redirect to the Timeline version of the [employer] SmartLink.      Patient's weights and intake/output reviewed    Daily Weight log at bedside, patient/family participation in use of log: \"yes    Patient's current weight today shows a difference of 0 lbs less than last documented weight.      Intake/Output Summary (Last 24 hours) at 5/19/2024 0544  Last data filed at 5/19/2024 0526  Gross per 24 hour   Intake 1645 ml   Output 2450 ml   Net -805 ml       Education Booklet Provided: yes    Comorbidities Reviewed Yes    Patient has a past medical history of Anemia, Angina, Arthritis, Atrial fibrillation ([facility]), CAD (coronary artery disease), CHF (congestive heart failure) ([facility]), CKD (chronic kidney disease) stage 3, GFR 30-59 ml/min ([facility]), Clostridium difficile diarrhea, Diabetes mellitus ([facility]), Diabetic neuropathy ([facility]), Disease of blood and blood forming organ, Dizziness, GERD (gastroesophageal reflux disease), Headache, Hearing loss, Hyperlipidemia, Hypertension, Kidney stone, Refusal of blood transfusions as patient is Nondenominational, Sleep apnea, Tinnitus, Venous ulcer ([facility]), and Wound, open.     >>For CHF and Comorbidity documentation on Education Time and Topics, please see Education Tab      Pt up in chair at this time on  3 L O2. Pt with complaints of shortness of

## 2024-05-19 NOTE — PROGRESS NOTES
Occupational Therapy  [address]  Occupational Therapy Initial Assessment and Treatment Note    Name: Shay Townsend  : 1955  MRN: [id number]  Date of Service: 2024    Discharge Recommendations:  Subacute/Skilled Nursing Facility  OT Equipment Recommendations  Equipment Needed: No  Other: defer      If pt is unable to be seen after this session, please let this note serve as discharge summary.  Please see case management note for discharge disposition.  Thank you.      Patient Diagnosis(es): The primary encounter diagnosis was Hypervolemia, unspecified hypervolemia type. Diagnoses of Troponin level elevated and Elevated brain natriuretic peptide (BNP) level were also pertinent to this visit.  Past Medical History:  has a past medical history of Anemia, Angina, Arthritis, Atrial fibrillation (HCC), CAD (coronary artery disease), CHF (congestive heart failure) (HCC), CKD (chronic kidney disease) stage 3, GFR 30-59 ml/min (HCC), Clostridium difficile diarrhea, Diabetes mellitus (HCC), Diabetic neuropathy (HCC), Disease of blood and blood forming organ, Dizziness, GERD (gastroesophageal reflux disease), Headache, Hearing loss, Hyperlipidemia, Hypertension, Kidney stone, Refusal of blood transfusions as patient is Sikh, Sleep apnea, Tinnitus, Venous ulcer (HCC), and Wound, open.  Past Surgical History:  has a past surgical history that includes hernia repair (age2); Cholecystectomy (2011); other surgical history (-16-11 REPAIR LOWER STERNAL INCISION, REMOVAL OF ONE STERNAL WIRE,); Upper gastrointestinal endoscopy; Cardiac surgery; other surgical history (10/10/2014); Pain management procedure (N/A, 2/10/2022); Pain management procedure (N/A, 2022); and Knee Arthrocentesis (Right, 10/6/2022).    Treatment Diagnosis: Heart Failure      Assessment   Performance deficits / Impairments: Decreased functional mobility ;Decreased safe awareness;Decreased  includes using toilet, bedpan, or urinal)?: Total  How much help is needed for putting on and taking off regular upper body clothing?: A Little  How much help is needed for taking care of personal grooming?: A Little  How much help for eating meals?: A Lot  AM-PAC Inpatient Daily Activity Raw Score: 13  AM-PAC Inpatient ADL T-Scale Score : 32.03  ADL Inpatient CMS 0-100% Score: 63.03  ADL Inpatient CMS G-Code Modifier : CL       Goals  Short Term Goals  Time Frame for Short Term Goals: 1 week (5/26/24)  Short Term Goal 1: Pt will complete functional/toilet transfer SBA 5/23/24  Short Term Goal 2: Pt will complete 1-2 grooming tasks at sink with CGA  Short Term Goal 3: Pt will verbalize understanding of CHF ed  Short Term Goal 4: Pt will complete x15 BUE ex  Patient Goals   Patient goals : \"to go home\"       Therapy Time   Individual Concurrent Group Co-treatment   Time In       1337   Time Out       1414   Minutes       37   Timed Code Treatment Minutes: 27 Minutes (10 minute eval)       Judi Moran, OT

## 2024-05-19 NOTE — PROGRESS NOTES
New water filled blisters left lower extremity. Encouraged to keep legs elevated when in the chair.

## 2024-05-19 NOTE — PROGRESS NOTES
Heart failure acute exacerbation  [] Severe exacerbation of chronic illness:    ---------------------------------------------------------------------  B. Risk of Treatment (any 1)   [x] Drugs/treatments that require intensive monitoring for toxicity include: IV Lasix,  [] Change in code status:    [] Decision to escalate care:    [] Major surgery/procedure with associated risk factors:    ----------------------------------------------------------------------  C. Data (any 2)  [] Discussed current management and discharge planning options with Case Management.  [] Discussed management of the case with:    [x] Telemetry personally reviewed and interpreted as documented above    [] Imaging personally reviewed and interpreted, includes:    [] Data Review (any 3)  [] Collateral history obtained from:    [x] All available Consultant notes from yesterday/today were reviewed  [x] All current labs were reviewed and interpreted for clinical significance   [x] Appropriate follow-up labs were ordered    Medications:  Personally reviewed in detail in conjunction w/ labs as documented for evidence of drug toxicity.     Infusion Medications    sodium chloride      dextrose       Scheduled Medications    insulin glargine  50 Units SubCUTAneous Nightly    insulin lispro  0-8 Units SubCUTAneous TID     insulin lispro  0-4 Units SubCUTAneous Nightly    timolol  1 drop Left Eye BID    brimonidine  1 drop Left Eye BID    dorzolamide  1 drop Left Eye BID    Netarsudil Dimesylate  1 drop Left Eye QPM    linaclotide  290 mcg Oral QAM AC    midodrine  10 mg Oral TID     albumin human 25%  25 g IntraVENous Q8H    furosemide  80 mg IntraVENous TID    allopurinol  300 mg Oral Daily    aspirin  81 mg Oral Daily    atorvastatin  80 mg Oral Nightly    carboxymethylcellulose PF  1 drop Both Eyes TID    carvedilol  3.125 mg Oral BID     docusate sodium  100 mg Oral BID    pantoprazole  40 mg Oral Daily    pregabalin  50 mg Oral BID    senna   1 tablet Oral BID    tamsulosin  0.8 mg Oral Daily    sodium chloride flush  5-40 mL IntraVENous 2 times per day    enoxaparin  30 mg SubCUTAneous BID     PRN Meds: oxyCODONE-acetaminophen, lidocaine, sodium chloride flush, sodium chloride, ondansetron **OR** ondansetron, polyethylene glycol, acetaminophen **OR** acetaminophen, magnesium sulfate, glucose, dextrose bolus **OR** dextrose bolus, glucagon (rDNA), dextrose     Labs:  Personally reviewed and interpreted for clinical significance.     Recent Labs     05/17/24 1841 05/19/24 0527   WBC 7.2 6.9   HGB 13.6 13.0*   HCT 42.8 40.4*    139     Recent Labs     05/17/24 1841 05/18/24  0445 05/19/24 0527   * 133* 133*   K 4.6 4.0 3.6   CL 93* 96* 94*   CO2 23 23 25   * 119* 125*   CREATININE 2.2* 2.1* 2.2*   CALCIUM 8.8 8.6 9.2   MG  --  2.90* 3.00*     Recent Labs     05/17/24 1841 05/17/24  1945   PROBNP 3,359*  --    TROPHS 89* 87*     No results for input(s): \"LABA1C\" in the last 72 hours.  No results for input(s): \"AST\", \"ALT\", \"BILIDIR\", \"BILITOT\", \"ALKPHOS\" in the last 72 hours.  No results for input(s): \"INR\", \"LACTA\", \"TSH\" in the last 72 hours.    Urine Cultures:   Lab Results   Component Value Date/Time    LABURIN  05/22/2018 02:30 PM     <10,000 CFU/ml mixed skin/urogenital deysi. No further workup     Blood Cultures:   Lab Results   Component Value Date/Time    BC No Growth after 4 days of incubation. 03/07/2022 04:30 PM     Lab Results   Component Value Date/Time    BLOODCULT2 No Growth after 4 days of incubation. 03/07/2022 04:34 PM     Organism:   Lab Results   Component Value Date/Time    ORG Pseudomonas aeruginosa 09/18/2020 03:23 PM    ORG Enterococcus faecalis 09/18/2020 03:23 PM         MEDINA BRAYDEN, MD

## 2024-05-19 NOTE — PROGRESS NOTES
Progress Note    HISTORY     CC:  Shortness of breath          We are following for MARGARITO and fluid overload        Subjective/   HPI:  Patient is not doing well.  Somnolent with more myoclonic jerking.  He is on IV lasix and his BUN and Scr are worsening.  BP stable     ROS:  Constitutional:  No fevers, No Chills, + weakness  Cardiovascular:  No palpations, + edema  Respiratory:  No wheezing, no cough  Skin:  No rash, no itching  :  No hematuria, no dysuria     Social Hx:  No Family at the bedside     Past Medical and Surgical History:  - Reviewed, no changes     EXAM       Objective/     Vitals:    05/19/24 0001 05/19/24 0526 05/19/24 0559 05/19/24 0738   BP: (!) 120/55  127/67 132/68   Pulse: 63  71 69   Resp: 18  18 18   Temp: 98.2 °F (36.8 °C)  98.1 °F (36.7 °C) 97.7 °F (36.5 °C)   TempSrc: Oral  Oral Oral   SpO2: 98%  91% 93%   Weight:  119.7 kg (263 lb 14.4 oz)     Height:         24HR INTAKE/OUTPUT:    Intake/Output Summary (Last 24 hours) at 5/19/2024 1221  Last data filed at 5/19/2024 0957  Gross per 24 hour   Intake 1080 ml   Output 2050 ml   Net -970 ml     Constitutional:  Ill appearing   Eyes:  Pupils reactive, sclera clear   Neck:  Normal thyroid, no masses   Cardiovascular:  Regular, no rub  Respiratory:  No distress, no wheezing  Psychiatry:  Flat mood/affect, alert  Abdomen: +bs, soft, nt, no masses   Musculoskeletal: ++ LE edema, no clubbing   Lymphatics:  No LAD in neck, no supraclavicular nodes       MEDICAL DECISION MAKING       Data/  Recent Labs     05/17/24 1841 05/19/24  0527   WBC 7.2 6.9   HGB 13.6 13.0*   HCT 42.8 40.4*   MCV 87.8 86.8    139     Recent Labs     05/17/24  1841 05/18/24  0445 05/19/24  0527   * 133* 133*   K 4.6 4.0 3.6   CL 93* 96* 94*   CO2 23 23 25   GLUCOSE 315* 273* 260*   MG  --  2.90* 3.00*   * 119* 125*   CREATININE 2.2* 2.1* 2.2*   LABGLOM 31* 33* 31*       Assessment/     Chronic Kidney Disease:  Stage 3b  Etiology:  Cardiorenal  syndrome  Stuck in a pattern of worsening labs to achieve effective diuresis with multiple admissions over the last 12 months despite weekly labs and daily weights as outpatient.   Scr and BUN worsening with diuresis despite IV albumin and midodrine    Decompensated Diastolic Heart Failure:  Patient with weight gain and hypoxia  Worsening LE edema and weight gain  He did have ascites once but we have attempted to get a paracentesis multiple times   Has severe pulmonary hypertension which is likely related to chronic volume overload   RV failure with hypotension.  Weight was 247 lbs but now 263 lbs   Anemia:  Stable   Hyponatremia:  Due to hypervolemia     Plan/     I discussed again with him that we need to start dialysis.    He is agreeable   Will consult IR for a tunneled dialysis catheter since I suspect he will end up being ESRD  Follow labs  Continue lasix for now  Will reduce his lyrica by 50% but I think his myoclonus is more related to uremia     Thank you for asking us to participate in the management of your patient, please do not hesitate to contact me for any concerns regarding my recommendations as outlined above.    -----------------------------  Pili Zaragoza M.D.   [facility]

## 2024-05-20 LAB
ALBUMIN SERPL-MCNC: 4.3 G/DL (ref 3.4–5)
ANION GAP SERPL CALCULATED.3IONS-SCNC: 16 MMOL/L (ref 3–16)
BILIRUB UR QL STRIP.AUTO: ABNORMAL
BUN SERPL-MCNC: 133 MG/DL (ref 7–20)
CALCIUM SERPL-MCNC: 9.4 MG/DL (ref 8.3–10.6)
CHARACTER UR: ABNORMAL
CHLORIDE SERPL-SCNC: 95 MMOL/L (ref 99–110)
CLARITY UR: ABNORMAL
CO2 SERPL-SCNC: 26 MMOL/L (ref 21–32)
COLOR UR: ABNORMAL
CREAT SERPL-MCNC: 2.1 MG/DL (ref 0.8–1.3)
EPI CELLS #/AREA URNS HPF: ABNORMAL /HPF (ref 0–5)
GFR SERPLBLD CREATININE-BSD FMLA CKD-EPI: 33 ML/MIN/{1.73_M2}
GLUCOSE BLD-MCNC: 201 MG/DL (ref 70–99)
GLUCOSE BLD-MCNC: 279 MG/DL (ref 70–99)
GLUCOSE BLD-MCNC: 372 MG/DL (ref 70–99)
GLUCOSE SERPL-MCNC: 206 MG/DL (ref 70–99)
GLUCOSE UR STRIP.AUTO-MCNC: ABNORMAL MG/DL
HGB UR QL STRIP.AUTO: ABNORMAL
INR PPP: 1.32 (ref 0.85–1.15)
KETONES UR STRIP.AUTO-MCNC: ABNORMAL MG/DL
LEUKOCYTE ESTERASE UR QL STRIP.AUTO: ABNORMAL
MAGNESIUM SERPL-MCNC: 2.8 MG/DL (ref 1.8–2.4)
NITRITE UR QL STRIP.AUTO: ABNORMAL
NT-PROBNP SERPL-MCNC: 4645 PG/ML (ref 0–124)
PERFORMED ON: ABNORMAL
PH UR STRIP.AUTO: ABNORMAL [PH] (ref 5–8)
PHOSPHATE SERPL-MCNC: 5.6 MG/DL (ref 2.5–4.9)
POTASSIUM SERPL-SCNC: 3.4 MMOL/L (ref 3.5–5.1)
PROT UR STRIP.AUTO-MCNC: ABNORMAL MG/DL
PROTHROMBIN TIME: 16.6 SEC (ref 11.9–14.9)
RBC #/AREA URNS HPF: >100 /HPF (ref 0–4)
SODIUM SERPL-SCNC: 137 MMOL/L (ref 136–145)
SP GR UR STRIP.AUTO: ABNORMAL (ref 1–1.03)
UA DIPSTICK W REFLEX MICRO PNL UR: YES
URN SPEC COLLECT METH UR: ABNORMAL
UROBILINOGEN UR STRIP-ACNC: ABNORMAL E.U./DL
WBC #/AREA URNS HPF: ABNORMAL /HPF (ref 0–5)

## 2024-05-20 PROCEDURE — 99232 SBSQ HOSP IP/OBS MODERATE 35: CPT | Performed by: NURSE PRACTITIONER

## 2024-05-20 PROCEDURE — 6370000000 HC RX 637 (ALT 250 FOR IP): Performed by: INTERNAL MEDICINE

## 2024-05-20 PROCEDURE — 85610 PROTHROMBIN TIME: CPT

## 2024-05-20 PROCEDURE — 6360000002 HC RX W HCPCS: Performed by: INTERNAL MEDICINE

## 2024-05-20 PROCEDURE — 97530 THERAPEUTIC ACTIVITIES: CPT

## 2024-05-20 PROCEDURE — P9047 ALBUMIN (HUMAN), 25%, 50ML: HCPCS | Performed by: INTERNAL MEDICINE

## 2024-05-20 PROCEDURE — 6370000000 HC RX 637 (ALT 250 FOR IP): Performed by: NURSE PRACTITIONER

## 2024-05-20 PROCEDURE — 2580000003 HC RX 258: Performed by: INTERNAL MEDICINE

## 2024-05-20 PROCEDURE — 81001 URINALYSIS AUTO W/SCOPE: CPT

## 2024-05-20 PROCEDURE — 6360000002 HC RX W HCPCS: Performed by: STUDENT IN AN ORGANIZED HEALTH CARE EDUCATION/TRAINING PROGRAM

## 2024-05-20 PROCEDURE — 36415 COLL VENOUS BLD VENIPUNCTURE: CPT

## 2024-05-20 PROCEDURE — 83735 ASSAY OF MAGNESIUM: CPT

## 2024-05-20 PROCEDURE — 2060000000 HC ICU INTERMEDIATE R&B

## 2024-05-20 PROCEDURE — 80069 RENAL FUNCTION PANEL: CPT

## 2024-05-20 PROCEDURE — 2700000000 HC OXYGEN THERAPY PER DAY

## 2024-05-20 PROCEDURE — 94761 N-INVAS EAR/PLS OXIMETRY MLT: CPT

## 2024-05-20 PROCEDURE — 83880 ASSAY OF NATRIURETIC PEPTIDE: CPT

## 2024-05-20 RX ORDER — HEPARIN SODIUM 5000 [USP'U]/ML
5000 INJECTION, SOLUTION INTRAVENOUS; SUBCUTANEOUS EVERY 8 HOURS SCHEDULED
Status: DISCONTINUED | OUTPATIENT
Start: 2024-05-20 | End: 2024-05-25 | Stop reason: HOSPADM

## 2024-05-20 RX ORDER — MORPHINE SULFATE 2 MG/ML
2 INJECTION, SOLUTION INTRAMUSCULAR; INTRAVENOUS EVERY 4 HOURS PRN
Status: COMPLETED | OUTPATIENT
Start: 2024-05-20 | End: 2024-05-23

## 2024-05-20 RX ADMIN — MIDODRINE HYDROCHLORIDE 10 MG: 5 TABLET ORAL at 08:14

## 2024-05-20 RX ADMIN — CARVEDILOL 3.12 MG: 3.12 TABLET, FILM COATED ORAL at 17:49

## 2024-05-20 RX ADMIN — PREGABALIN 50 MG: 25 CAPSULE ORAL at 08:14

## 2024-05-20 RX ADMIN — ALBUMIN (HUMAN) 25 G: 0.25 INJECTION, SOLUTION INTRAVENOUS at 04:33

## 2024-05-20 RX ADMIN — INSULIN LISPRO 4 UNITS: 100 INJECTION, SOLUTION INTRAVENOUS; SUBCUTANEOUS at 23:20

## 2024-05-20 RX ADMIN — DORZOLAMIDE HYDROCHLORIDE 1 DROP: 20 SOLUTION/ DROPS OPHTHALMIC at 08:16

## 2024-05-20 RX ADMIN — DOCUSATE SODIUM 100 MG: 100 CAPSULE, LIQUID FILLED ORAL at 08:14

## 2024-05-20 RX ADMIN — INSULIN LISPRO 2 UNITS: 100 INJECTION, SOLUTION INTRAVENOUS; SUBCUTANEOUS at 08:41

## 2024-05-20 RX ADMIN — ENOXAPARIN SODIUM 30 MG: 100 INJECTION SUBCUTANEOUS at 08:15

## 2024-05-20 RX ADMIN — INSULIN LISPRO 4 UNITS: 100 INJECTION, SOLUTION INTRAVENOUS; SUBCUTANEOUS at 17:53

## 2024-05-20 RX ADMIN — SODIUM CHLORIDE, PRESERVATIVE FREE 10 ML: 5 INJECTION INTRAVENOUS at 23:35

## 2024-05-20 RX ADMIN — ACETAMINOPHEN 650 MG: 325 TABLET ORAL at 08:12

## 2024-05-20 RX ADMIN — FUROSEMIDE 80 MG: 10 INJECTION, SOLUTION INTRAMUSCULAR; INTRAVENOUS at 13:46

## 2024-05-20 RX ADMIN — MIDODRINE HYDROCHLORIDE 10 MG: 5 TABLET ORAL at 13:46

## 2024-05-20 RX ADMIN — ONDANSETRON 4 MG: 2 INJECTION INTRAMUSCULAR; INTRAVENOUS at 08:12

## 2024-05-20 RX ADMIN — HEPARIN SODIUM 5000 UNITS: 5000 INJECTION INTRAVENOUS; SUBCUTANEOUS at 13:46

## 2024-05-20 RX ADMIN — BRIMONIDINE TARTRATE 1 DROP: 2 SOLUTION OPHTHALMIC at 08:16

## 2024-05-20 RX ADMIN — MIDODRINE HYDROCHLORIDE 10 MG: 5 TABLET ORAL at 17:20

## 2024-05-20 RX ADMIN — SENNOSIDES 8.6 MG: 8.6 TABLET, FILM COATED ORAL at 23:18

## 2024-05-20 RX ADMIN — CARBOXYMETHYLCELLULOSE SODIUM 1 DROP: 10 GEL OPHTHALMIC at 13:46

## 2024-05-20 RX ADMIN — SENNOSIDES 8.6 MG: 8.6 TABLET, FILM COATED ORAL at 08:14

## 2024-05-20 RX ADMIN — HEPARIN SODIUM 5000 UNITS: 5000 INJECTION INTRAVENOUS; SUBCUTANEOUS at 23:24

## 2024-05-20 RX ADMIN — POLYETHYLENE GLYCOL 3350 17 G: 17 POWDER, FOR SOLUTION ORAL at 08:14

## 2024-05-20 RX ADMIN — FUROSEMIDE 80 MG: 10 INJECTION, SOLUTION INTRAMUSCULAR; INTRAVENOUS at 23:23

## 2024-05-20 RX ADMIN — TAMSULOSIN HYDROCHLORIDE 0.8 MG: 0.4 CAPSULE ORAL at 04:34

## 2024-05-20 RX ADMIN — CARBOXYMETHYLCELLULOSE SODIUM 1 DROP: 10 GEL OPHTHALMIC at 08:16

## 2024-05-20 RX ADMIN — DORZOLAMIDE HYDROCHLORIDE 1 DROP: 20 SOLUTION/ DROPS OPHTHALMIC at 23:36

## 2024-05-20 RX ADMIN — TIMOLOL MALEATE 1 DROP: 5 SOLUTION OPHTHALMIC at 08:16

## 2024-05-20 RX ADMIN — PANTOPRAZOLE SODIUM 40 MG: 40 TABLET, DELAYED RELEASE ORAL at 08:14

## 2024-05-20 RX ADMIN — OXYCODONE AND ACETAMINOPHEN 1 TABLET: 5; 325 TABLET ORAL at 17:20

## 2024-05-20 RX ADMIN — ALLOPURINOL 300 MG: 300 TABLET ORAL at 08:14

## 2024-05-20 RX ADMIN — FUROSEMIDE 80 MG: 10 INJECTION, SOLUTION INTRAMUSCULAR; INTRAVENOUS at 08:12

## 2024-05-20 RX ADMIN — INSULIN GLARGINE 50 UNITS: 100 INJECTION, SOLUTION SUBCUTANEOUS at 23:20

## 2024-05-20 RX ADMIN — POTASSIUM BICARBONATE 40 MEQ: 782 TABLET, EFFERVESCENT ORAL at 23:15

## 2024-05-20 RX ADMIN — CARBOXYMETHYLCELLULOSE SODIUM 1 DROP: 10 GEL OPHTHALMIC at 23:36

## 2024-05-20 RX ADMIN — ATORVASTATIN CALCIUM 80 MG: 80 TABLET, FILM COATED ORAL at 23:19

## 2024-05-20 RX ADMIN — DOCUSATE SODIUM 100 MG: 100 CAPSULE, LIQUID FILLED ORAL at 23:18

## 2024-05-20 RX ADMIN — BRIMONIDINE TARTRATE 1 DROP: 2 SOLUTION OPHTHALMIC at 23:36

## 2024-05-20 ASSESSMENT — PAIN SCALES - GENERAL: PAINLEVEL_OUTOF10: 10

## 2024-05-20 NOTE — DISCHARGE INSTRUCTIONS
Heart Failure Resources:  Heart Failure Interactive Workbook:  Go to [email or web address] for a Free Heart Failure Interactive Workbook provided by The American Heart Association. This interactive workbook will provide information on Healthier Living with Heart Failure. Please copy and paste link into search bar. Use your mouse to scroll through the pages.    HF [place] carmina:   Heart Failure Free smart phone carmina available for iPhone and Android download. Use your phone to track sodium intake, fluid intake, symptoms, and weight.     Low Sodium Diet / Recipes:  Go to www.[employer] website for “renal” diet which is Low Sodium! [employer] is a dialysis company, but this website offers free seasonal cookbooks. Each quarter, they will release 25-30 new recipes with a breakdown of calories, sodium, and glucose. You can also go to www.[employer]/recipes website for free recipes.   Home Exercise Program:   Identification of Green/Yellow/Red zones:  You should be able to identify when you feel good (green zone), if you have 1-2 symptoms of HF (yellow zone), or if you are in need of medical attention (red zone).  In your CHF education folder you were provided a “stop light tool” to outline this information.     We want to you to rate your exertion levels:    Our therapy team has discussed means of identification with you such as the \"Alana scale.\"  The Alana rating scale ranges from 6 to 20, where 6 means \"no exertion at all\" and 20 means \"maximal exertion.\" The goal is to use this to gauge how much effort it is taking for you to do your normal daily tasks.   You should be able to recognize when too much exertion is being expended.    Elements of Energy Conservation:   Prioritize/Plan: Decide what needs to be done today, and what can wait for a later date, write to do lists, plan ahead to avoid extra trips, and gather supplies and equipment needed before starting an activity.   Position: Avoid

## 2024-05-20 NOTE — PROGRESS NOTES
Patient ambulated to the bathroom with assist x1 and walker to attempt to have BM, however pt unsuccessful. Patient back in bed with call light and bedside table with in reach; room door open. Scheduled medications given. VSS. Crackles throughout mid and lower lobes. Sp02 93% on 3L.  Sacral and BLE edema +3. Little catheter patent and draining however urine color has changed from morelia in color to cherry red. 700 mls o/p documented.

## 2024-05-20 NOTE — PROGRESS NOTES
The following perfect serve was sent to Kitty Mirza: \"Patient here with CHF getting high dose lasix 80 mg TID and will be starting new HD soon. Has a garcia. At the start of my shift he had 700 mls morelia colored urine out. Now his urine has changed to a cherry red color and then to a dark red color with an aditional 800 mls of urine o/p. This drastic change in urine color is concerning and I wanted to let you know to see if there is anything I should do\". Currently waiting on response.

## 2024-05-20 NOTE — PROGRESS NOTES
05:58   Pro-BNP 0 - 124 pg/mL 3,359 (H)   4,645 (H)   Troponin, High Sensitivity 0 - 22 ng/L 89 (H) 87 (H)     Cholesterol, Total 0 - 199 mg/dL   79    HDL Cholesterol 40 - 60 mg/dL   35 (L)    LDL Cholesterol <100 mg/dL   29    Triglycerides 0 - 150 mg/dL   77    VLDL Not Established mg/dL   15    (H): Data is abnormally high  (L): Data is abnormally low    Echo 5/3/24:  Technically difficult study.   Left ventricle is normal in size with low normal systolic function.   Poor visualization of endocardium, cannot exclude wall motion abnormalities.   LVEF is estimated at 50-54% with indeterminate relaxation.   Septal flattening is noted consistent with RV volume/pressure overload.   Right ventricle is poorly visualized but appears moderately dilatated with   reduced systolic function.   Right atrium is severely enlarged.   Calcified aortic valve with moderate stenosis and mild regurgitation;   estimated RHIANNON: 1.3 cm2, mean gradient 23 mmHg.   Mild to moderate tricuspid valve regurgitation.   Systolic pulmonary artery pressure (SPAP) is elevated and estimated at 66   mmHg (right atrial pressure 15 mmHg) consistent with severe pulmonary   hypertension.   Aortic root is mildly dilated at 4.0 cm.   Echo findings consistent with elevated right-sided pressures, correlate   clinically.    Nuclear Stress Test: 03/10/2022   Summary   Abnormal moderate risk myocardial perfusion study.   There is a large area of mixed ischemia and scar within the infero-apical,   apical-lateral, lateral, posterior-lateral, and posterior-basal segments.   The left ventricular size is moderately dilated.   The estimated left ventricular function is 67%.    Cath: 01/12/2012   HEMODYNAMICS:  RA pressure mean of 21.  RV pressure is 70/26.  PA pressure is  73/27.  The LV pressure is 135, EDP of 8.  AO pressure is 140/60.  The Anh  cardiac output was 6.16 L/min.  The pulmonary _____ 165 _____ per second.   The left ventricular function is preserved.

## 2024-05-20 NOTE — PROGRESS NOTES
Patient refusing tunneled dialysis catheter at this time.    Please re-consult IR if we can be of service in the future.      Nyasia Arredondo MD  Vascular and Interventional Radiology

## 2024-05-20 NOTE — PROGRESS NOTES
Occupational Therapy  Facility/Department: [facility] C4 PCU  Daily Treatment Note  NAME: Shay Townsend  : 1955  MRN: [id number]    Date of Service: 2024    Discharge Recommendations:  Subacute/Skilled Nursing Facility  OT Equipment Recommendations  Equipment Needed: No  Other: defer    Therapy discharge recommendations are subject to collaboration from the patient’s interdisciplinary healthcare team, including MD and case management recommendations.    Barriers to Home Discharge:   [] Steps to access home entry or bed/bath:   [x] Unable to transfer, ambulate, or propel wheelchair household distances without assist   [x] Limited available assist at home upon discharge    [] Patient or family requests d/c to post-acute facility    [] Poor cognition/safety awareness for d/c to home alone    [] Unable to maintain ordered weight bearing status    [] Patient with salient signs of long-standing immobility   [x] Decreased independence with ADLs   [x] Increased risk for falls   [] Other:    If pt is unable to be seen after this session, please let this note serve as discharge summary.  Please see case management note for discharge disposition.  Thank you.      Patient Diagnosis(es): The primary encounter diagnosis was Hypervolemia, unspecified hypervolemia type. Diagnoses of Troponin level elevated and Elevated brain natriuretic peptide (BNP) level were also pertinent to this visit.     Assessment    Assessment: Pt seated in chair at start of session. Pt tolerates OT session fair but extremely lethargic and difficult to arouse. Pt slumped in chair and unsafe to remain in chair. RN present to assist OT with transfers this date. Pt requires modAx2 to complete stand to RW from chair but unable to take steps d/t being lethargic and BLEs buckling. Pt completes second stand with modAx2 to SaraStedy and is dependently transferred from chair to bed. Pt is limited by fatigue, weakness, activity tolerance - OT recommends SNF

## 2024-05-20 NOTE — CARE COORDINATION
Following for care plan; per MD, patient does not want dialysis and stating preference for comfort care measures only; now has Palliative Care consult. Await outcome of PC discussion to confirm care plan for dc.      Patient is from [facility] unit; call to Rita gillis, at [facility], to confirm status and ability to return; await return call.      LG

## 2024-05-20 NOTE — PRE SEDATION
Sedation Pre-Procedure Note    Patient Name: Shay Townsend   YOB: 1955  Room/Bed: 0447/0447-01  Medical Record Number: [id number]  Date: 5/20/2024   Time: 9:23 AM       Indication:  Tunneled Dialysis Catheter Placement    Consent: I have discussed with the patient and/or the patient representative the indication, alternatives, and the possible risks and/or complications of the planned procedure and the anesthesia methods. The patient and/or patient representative appear to understand and agree to proceed.    Vital Signs:   Vitals:    05/20/24 0808   BP: 100/60   Pulse: 64   Resp: 20   Temp: 97.8 °F (36.6 °C)   SpO2: 93%       Past Medical History:   has a past medical history of Anemia, Angina, Arthritis, Atrial fibrillation ([facility]), CAD (coronary artery disease), CHF (congestive heart failure) ([facility]), CKD (chronic kidney disease) stage 3, GFR 30-59 ml/min ([facility]), Clostridium difficile diarrhea, Diabetes mellitus ([facility]), Diabetic neuropathy ([facility]), Disease of blood and blood forming organ, Dizziness, GERD (gastroesophageal reflux disease), Headache, Hearing loss, Hyperlipidemia, Hypertension, Kidney stone, Refusal of blood transfusions as patient is [employer], Sleep apnea, Tinnitus, Venous ulcer ([facility]), and Wound, open.    Past Surgical History:   has a past surgical history that includes hernia repair (age2); Cholecystectomy (9/2011); other surgical history (11-16-11 REPAIR LOWER STERNAL INCISION, REMOVAL OF ONE STERNAL WIRE,); Upper gastrointestinal endoscopy; Cardiac surgery; other surgical history (10/10/2014); Pain management procedure (N/A, 2/10/2022); Pain management procedure (N/A, 7/21/2022); and Knee Arthrocentesis (Right, 10/6/2022).    Medications:   Scheduled Meds:    heparin (porcine)  5,000 Units SubCUTAneous 3 times per day    insulin glargine  50 Units SubCUTAneous Nightly    insulin lispro  0-8 Units SubCUTAneous TID WC    insulin lispro  0-4 Units SubCUTAneous Nightly     suspension Take 30 mLs by mouth daily as needed for Constipation Give if no BM for 3 days. Do not give more than 3 consecutive doses.    Hermelinda Alva MD   RHOPRESSA 0.02 % SOLN Place 1 drop into the left eye nightly 5/19/22   Hermelinda Alva MD   dorzolamide (TRUSOPT) 2 % ophthalmic solution Place 1 drop into the left eye 2 times daily 5/21/22   Hermelinda Alva MD   brimonidine-timolol (COMBIGAN) 0.2-0.5 % ophthalmic solution Place 1 drop into the left eye 2 times daily 5/20/22   Hermelinda Alva MD   carvedilol (COREG) 3.125 MG tablet Take 1 tablet by mouth 2 times daily (with meals) 4/9/22   Samm Pimentel MD   linaclotide (LINZESS) 290 MCG CAPS capsule Take 1 capsule by mouth every morning (before breakfast) 4/10/22   Samm Pimentel MD   tamsulosin (FLOMAX) 0.4 MG capsule Take 2 capsules by mouth Daily 12/30/21   Hermelinda Alva MD   atorvastatin (LIPITOR) 80 MG tablet Take 1 tablet by mouth nightly 3/14/22   Melany Ibrahim APRN - CNP   allopurinol (ZYLOPRIM) 300 MG tablet Take 1 tablet by mouth daily 3/15/22   Alvaro Nolasco MD   senna (SENOKOT) 8.6 MG TABS tablet Take 1 tablet by mouth 2 times daily 7/8/19   Nany Mann APRN - CNP   docusate sodium (COLACE, DULCOLAX) 100 MG CAPS Take 100 mg by mouth 2 times daily 5/1/18   Ashlie Bass APRN - CNP   nitroGLYCERIN (NITROSTAT) 0.4 MG SL tablet Place 1 tablet under the tongue every 5 minutes as needed  Patient not taking: Reported on 8/24/2022 8/17/15 9/1/22  Kellie Patino APRN - CNP   ferrous sulfate 325 (65 FE) MG tablet Take 1 tablet by mouth 2 times daily 12/22/10   Hermelinda Alva MD     Coumadin Use Last 7 Days:  no  Antiplatelet drug therapy use last 7 days: yes - asa  Other anticoagulant use last 7 days: no  Additional Medication Information:  na      Pre-Sedation Documentation and Exam:   I have reviewed the patient's history and review of systems.    Mallampati Airway

## 2024-05-20 NOTE — PROGRESS NOTES
Physical Therapy  Facility/Department: [place] [address] PCU  Daily Treatment Note  NAME: Shay Townsend  : 1955  MRN: [id number]    Date of Service: 2024    Discharge Recommendations:  Subacute/Skilled Nursing Facility   PT Equipment Recommendations  Equipment Needed: No  Other: defer to next level of care.    Therapy discharge recommendations take into account each patient's current medical complexities and are subject to input/oversight from the patient's healthcare team.   Barriers to Home Discharge:   [] Steps to access home entry or bed/bath:   [x] Unable to transfer, ambulate, or propel wheelchair household distances without assist   [x] Limited available assist at home upon discharge    [] Patient or family requests d/c to post-acute facility    [] Poor cognition/safety awareness for d/c to home alone    []Unable to maintain ordered weight bearing status    [] Patient with salient signs of long-standing immobility   [x] Patient is at risk for falls    [] Other:    If pt is unable to be seen after this session, please let this note serve as discharge summary.  Please see case management note for discharge disposition.  Thank you.      Patient Diagnosis(es): The primary encounter diagnosis was Hypervolemia, unspecified hypervolemia type. Diagnoses of Troponin level elevated and Elevated brain natriuretic peptide (BNP) level were also pertinent to this visit.    Assessment   Assessment: Pt tolerated treatment session fairly well, having increased pain at catheter site and in BLE's. Pt able to perform bed mobility with min A and transfer bed to chair with RW and min A. Pt limited by fatigue/pain as well as low vision, requires verbal and tactile cues for safety. Unable to ambulate at this time due to above deficits. Pt would continue to benefit from skilled therapy during LOS to progress mobility as tolerated. Continue to recommend SNF at d/c.  Activity Tolerance: Patient limited by fatigue;Patient limited

## 2024-05-20 NOTE — PROGRESS NOTES
Hospital Medicine Progress Note      Date of Admission: 5/17/2024  Hospital Day: 4    Chief Admission Complaint:  Dyspnea     Subjective: Patient is in hospital chair awake and alert.  No new issues or complaints today.  Patient states that he feels \"a little bit better \". Denies fevers, chills, sweats. Denies chest pain & palpitations. Denies shortness of breath and cough. Denies nausea, vomiting, or diarrhea. Denies changes in urination. Spoke with nursing staff and was informed of no acute issues overnight.    Presenting Admission History:       69-year-old male  with a PMH of chronic kidney disease, diastolic congestive heart failure, CAD, CABG, diabetes mellitus type 2, hypertension, hyperlipidemia.     He does have history of cardiorenal syndrome, he has had multiple hospitalizations for decompensated heart failure despite being on high dose diuretics. EF slightly reduced but severe pulmonary hypertension with dilated right and left atria. Scr is 2.1. Recent admission and lost 20 lbs but gained it right back. Now 263 lbs with discharge weight of 247 lbs.     He was admitted to [facility] from 5/2 to 5/10/2024 for acute on chronic diastolic heart failure with associated hypoxic respiratory failure.  He was discharged to the F and over the past week he gained significant weight..  He was having increased lower extremity swelling, abdominal distention or some shortness of breath.  He was sent from the F back to the ED for further evaluation.  Chest x-ray did reveal vascular congestion, and he was admitted for further management and evaluation.    Assessment/Plan:      Current Principal Problem:  Heart failure (HCC)    Acute Respiratory Failure - w/ hypoxia, POArrival.  Presence of clinical respiratory distress w/ tachypnea/dyspnea/SOB and wheezing w/ use of accessory muscles to breath.  Supplemental O2 and wean as tolerated.     CHF - acute on chronic diastolic failure w/ preserved EF 50-55% by Echo

## 2024-05-20 NOTE — CONSULTS
Consult Placed     Who: Riana Anders  Date:  Time:1115     Electronically signed by Radha Titus on 5/20/2024 at 11:18 AM

## 2024-05-20 NOTE — PROGRESS NOTES
Nephrology Progress Note   [facility]      This patient is a 69 year old male whom we are following for MARGARITO on CKD and fluid overload.      Subjective:  The patient was seen and examined. Refused TDC placement this morning. Up on a chair and sleeping but arousable. Has close friends in the room and he was able to recognize them.    Family History: Visitors at bedside  ROS: Weak, tired      Vitals:  BP (!) 109/55   Pulse 62   Temp 97.8 °F (36.6 °C) (Oral)   Resp 20   Ht 1.727 m (5' 8\")   Wt 117.5 kg (259 lb 1.6 oz)   SpO2 96%   BMI 39.40 kg/m²   I/O last 3 completed shifts:  In: 1440 [P.O.:1440]  Out: 3250 [Urine:3250]  No intake/output data recorded.    Physical Exam  Vitals reviewed.   Constitutional:       General: He is not in acute distress.  HENT:      Head: Normocephalic and atraumatic.      Mouth/Throat:      Mouth: Mucous membranes are moist.   Eyes:      General: No scleral icterus.     Conjunctiva/sclera: Conjunctivae normal.   Cardiovascular:      Rate and Rhythm: Normal rate.   Pulmonary:      Effort: Pulmonary effort is normal. No respiratory distress.      Breath sounds: Rales present. No wheezing.   Abdominal:      General: Bowel sounds are normal. There is no distension.      Tenderness: There is no abdominal tenderness.   Musculoskeletal:      Right lower leg: Edema present.      Left lower leg: Edema present.           Medications:   heparin (porcine)  5,000 Units SubCUTAneous 3 times per day    insulin glargine  50 Units SubCUTAneous Nightly    insulin lispro  0-8 Units SubCUTAneous TID     insulin lispro  0-4 Units SubCUTAneous Nightly    pregabalin  50 mg Oral Daily    timolol  1 drop Left Eye BID    brimonidine  1 drop Left Eye BID    dorzolamide  1 drop Left Eye BID    Netarsudil Dimesylate  1 drop Left Eye QPM    linaclotide  290 mcg Oral QAM AC    midodrine  10 mg Oral TID WC    furosemide  80 mg IntraVENous TID    allopurinol  300 mg Oral Daily    [Held by provider] aspirin

## 2024-05-20 NOTE — PROGRESS NOTES
This RN at bedside to bring pt to TDC. Pt in chair and states he no longer wants dialysis line and that he would talk to nephrology regarding dialysis plans. Primary nurse and nephrology aware. Procedure cancelled at this time.

## 2024-05-21 ENCOUNTER — APPOINTMENT (OUTPATIENT)
Dept: INTERVENTIONAL RADIOLOGY/VASCULAR | Age: 69
End: 2024-05-21
Attending: INTERNAL MEDICINE
Payer: MEDICARE

## 2024-05-21 LAB
ALBUMIN SERPL-MCNC: 4 G/DL (ref 3.4–5)
ALBUMIN/GLOB SERPL: 1.4 {RATIO} (ref 1.1–2.2)
ALP SERPL-CCNC: 81 U/L (ref 40–129)
ALT SERPL-CCNC: 6 U/L (ref 10–40)
ANION GAP SERPL CALCULATED.3IONS-SCNC: 15 MMOL/L (ref 3–16)
ANION GAP SERPL CALCULATED.3IONS-SCNC: 16 MMOL/L (ref 3–16)
AST SERPL-CCNC: 14 U/L (ref 15–37)
BASOPHILS # BLD: 0 K/UL (ref 0–0.2)
BASOPHILS NFR BLD: 0.3 %
BILIRUB SERPL-MCNC: 1 MG/DL (ref 0–1)
BUN SERPL-MCNC: 139 MG/DL (ref 7–20)
BUN SERPL-MCNC: 141 MG/DL (ref 7–20)
CALCIUM SERPL-MCNC: 9.2 MG/DL (ref 8.3–10.6)
CALCIUM SERPL-MCNC: 9.2 MG/DL (ref 8.3–10.6)
CHLORIDE SERPL-SCNC: 93 MMOL/L (ref 99–110)
CHLORIDE SERPL-SCNC: 95 MMOL/L (ref 99–110)
CO2 SERPL-SCNC: 27 MMOL/L (ref 21–32)
CO2 SERPL-SCNC: 29 MMOL/L (ref 21–32)
CREAT SERPL-MCNC: 1.8 MG/DL (ref 0.8–1.3)
CREAT SERPL-MCNC: 2.1 MG/DL (ref 0.8–1.3)
DEPRECATED RDW RBC AUTO: 19.6 % (ref 12.4–15.4)
DEPRECATED RDW RBC AUTO: 19.8 % (ref 12.4–15.4)
DEPRECATED RDW RBC AUTO: 20 % (ref 12.4–15.4)
EOSINOPHIL # BLD: 0.1 K/UL (ref 0–0.6)
EOSINOPHIL NFR BLD: 0.7 %
GFR SERPLBLD CREATININE-BSD FMLA CKD-EPI: 33 ML/MIN/{1.73_M2}
GFR SERPLBLD CREATININE-BSD FMLA CKD-EPI: 40 ML/MIN/{1.73_M2}
GLUCOSE BLD-MCNC: 229 MG/DL (ref 70–99)
GLUCOSE BLD-MCNC: 244 MG/DL (ref 70–99)
GLUCOSE BLD-MCNC: 269 MG/DL (ref 70–99)
GLUCOSE BLD-MCNC: 274 MG/DL (ref 70–99)
GLUCOSE BLD-MCNC: 282 MG/DL (ref 70–99)
GLUCOSE SERPL-MCNC: 221 MG/DL (ref 70–99)
GLUCOSE SERPL-MCNC: 321 MG/DL (ref 70–99)
HCT VFR BLD AUTO: 38.1 % (ref 40.5–52.5)
HCT VFR BLD AUTO: 39.8 % (ref 40.5–52.5)
HCT VFR BLD AUTO: 40.9 % (ref 40.5–52.5)
HGB BLD-MCNC: 12.2 G/DL (ref 13.5–17.5)
HGB BLD-MCNC: 12.7 G/DL (ref 13.5–17.5)
HGB BLD-MCNC: 13.2 G/DL (ref 13.5–17.5)
LYMPHOCYTES # BLD: 0.5 K/UL (ref 1–5.1)
LYMPHOCYTES NFR BLD: 4.5 %
MAGNESIUM SERPL-MCNC: 2.7 MG/DL (ref 1.8–2.4)
MAGNESIUM SERPL-MCNC: 2.7 MG/DL (ref 1.8–2.4)
MCH RBC QN AUTO: 27.6 PG (ref 26–34)
MCH RBC QN AUTO: 27.7 PG (ref 26–34)
MCH RBC QN AUTO: 27.9 PG (ref 26–34)
MCHC RBC AUTO-ENTMCNC: 32 G/DL (ref 31–36)
MCHC RBC AUTO-ENTMCNC: 32.1 G/DL (ref 31–36)
MCHC RBC AUTO-ENTMCNC: 32.1 G/DL (ref 31–36)
MCV RBC AUTO: 86.2 FL (ref 80–100)
MCV RBC AUTO: 86.4 FL (ref 80–100)
MCV RBC AUTO: 86.9 FL (ref 80–100)
MONOCYTES # BLD: 0.8 K/UL (ref 0–1.3)
MONOCYTES NFR BLD: 7.2 %
NEUTROPHILS # BLD: 9.3 K/UL (ref 1.7–7.7)
NEUTROPHILS NFR BLD: 87.3 %
PERFORMED ON: ABNORMAL
PLATELET # BLD AUTO: 123 K/UL (ref 135–450)
PLATELET # BLD AUTO: 123 K/UL (ref 135–450)
PLATELET # BLD AUTO: 143 K/UL (ref 135–450)
PMV BLD AUTO: 10.7 FL (ref 5–10.5)
PMV BLD AUTO: 9 FL (ref 5–10.5)
PMV BLD AUTO: 9.5 FL (ref 5–10.5)
POTASSIUM SERPL-SCNC: 3.4 MMOL/L (ref 3.5–5.1)
POTASSIUM SERPL-SCNC: 3.8 MMOL/L (ref 3.5–5.1)
PROT SERPL-MCNC: 6.8 G/DL (ref 6.4–8.2)
RBC # BLD AUTO: 4.42 M/UL (ref 4.2–5.9)
RBC # BLD AUTO: 4.6 M/UL (ref 4.2–5.9)
RBC # BLD AUTO: 4.71 M/UL (ref 4.2–5.9)
SODIUM SERPL-SCNC: 137 MMOL/L (ref 136–145)
SODIUM SERPL-SCNC: 138 MMOL/L (ref 136–145)
WBC # BLD AUTO: 10.7 K/UL (ref 4–11)
WBC # BLD AUTO: 8.6 K/UL (ref 4–11)
WBC # BLD AUTO: 8.8 K/UL (ref 4–11)

## 2024-05-21 PROCEDURE — 83735 ASSAY OF MAGNESIUM: CPT

## 2024-05-21 PROCEDURE — 2580000003 HC RX 258: Performed by: RADIOLOGY

## 2024-05-21 PROCEDURE — 0JH63XZ INSERTION OF TUNNELED VASCULAR ACCESS DEVICE INTO CHEST SUBCUTANEOUS TISSUE AND FASCIA, PERCUTANEOUS APPROACH: ICD-10-PCS | Performed by: RADIOLOGY

## 2024-05-21 PROCEDURE — 76937 US GUIDE VASCULAR ACCESS: CPT

## 2024-05-21 PROCEDURE — 2580000003 HC RX 258: Performed by: INTERNAL MEDICINE

## 2024-05-21 PROCEDURE — C1769 GUIDE WIRE: HCPCS

## 2024-05-21 PROCEDURE — 97535 SELF CARE MNGMENT TRAINING: CPT

## 2024-05-21 PROCEDURE — 02H633Z INSERTION OF INFUSION DEVICE INTO RIGHT ATRIUM, PERCUTANEOUS APPROACH: ICD-10-PCS | Performed by: RADIOLOGY

## 2024-05-21 PROCEDURE — 97110 THERAPEUTIC EXERCISES: CPT

## 2024-05-21 PROCEDURE — 2060000000 HC ICU INTERMEDIATE R&B

## 2024-05-21 PROCEDURE — 2580000003 HC RX 258: Performed by: NURSE PRACTITIONER

## 2024-05-21 PROCEDURE — 6370000000 HC RX 637 (ALT 250 FOR IP): Performed by: INTERNAL MEDICINE

## 2024-05-21 PROCEDURE — 94761 N-INVAS EAR/PLS OXIMETRY MLT: CPT

## 2024-05-21 PROCEDURE — 2700000000 HC OXYGEN THERAPY PER DAY

## 2024-05-21 PROCEDURE — 85025 COMPLETE CBC W/AUTO DIFF WBC: CPT

## 2024-05-21 PROCEDURE — 5A1D70Z PERFORMANCE OF URINARY FILTRATION, INTERMITTENT, LESS THAN 6 HOURS PER DAY: ICD-10-PCS | Performed by: RADIOLOGY

## 2024-05-21 PROCEDURE — 80053 COMPREHEN METABOLIC PANEL: CPT

## 2024-05-21 PROCEDURE — 6370000000 HC RX 637 (ALT 250 FOR IP): Performed by: NURSE PRACTITIONER

## 2024-05-21 PROCEDURE — 6360000002 HC RX W HCPCS: Performed by: INTERNAL MEDICINE

## 2024-05-21 PROCEDURE — 97530 THERAPEUTIC ACTIVITIES: CPT

## 2024-05-21 PROCEDURE — 6360000002 HC RX W HCPCS: Performed by: RADIOLOGY

## 2024-05-21 PROCEDURE — 36415 COLL VENOUS BLD VENIPUNCTURE: CPT

## 2024-05-21 PROCEDURE — 85027 COMPLETE CBC AUTOMATED: CPT

## 2024-05-21 PROCEDURE — 77001 FLUOROGUIDE FOR VEIN DEVICE: CPT

## 2024-05-21 PROCEDURE — 36558 INSERT TUNNELED CV CATH: CPT

## 2024-05-21 RX ORDER — CEFAZOLIN SODIUM IN 0.9 % NACL 2 G/100 ML
2000 PLASTIC BAG, INJECTION (ML) INTRAVENOUS ONCE
Status: DISCONTINUED | OUTPATIENT
Start: 2024-05-21 | End: 2024-05-25 | Stop reason: HOSPADM

## 2024-05-21 RX ORDER — FENTANYL CITRATE 50 UG/ML
INJECTION, SOLUTION INTRAMUSCULAR; INTRAVENOUS PRN
Status: COMPLETED | OUTPATIENT
Start: 2024-05-21 | End: 2024-05-21

## 2024-05-21 RX ORDER — 0.9 % SODIUM CHLORIDE 0.9 %
250 INTRAVENOUS SOLUTION INTRAVENOUS ONCE
Status: COMPLETED | OUTPATIENT
Start: 2024-05-21 | End: 2024-05-21

## 2024-05-21 RX ADMIN — BRIMONIDINE TARTRATE 1 DROP: 2 SOLUTION OPHTHALMIC at 09:47

## 2024-05-21 RX ADMIN — TIMOLOL MALEATE 1 DROP: 5 SOLUTION OPHTHALMIC at 01:01

## 2024-05-21 RX ADMIN — ALLOPURINOL 300 MG: 300 TABLET ORAL at 09:45

## 2024-05-21 RX ADMIN — SENNOSIDES 8.6 MG: 8.6 TABLET, FILM COATED ORAL at 09:44

## 2024-05-21 RX ADMIN — FENTANYL CITRATE 25 MCG: 50 INJECTION, SOLUTION INTRAMUSCULAR; INTRAVENOUS at 12:08

## 2024-05-21 RX ADMIN — INSULIN LISPRO 4 UNITS: 100 INJECTION, SOLUTION INTRAVENOUS; SUBCUTANEOUS at 09:46

## 2024-05-21 RX ADMIN — FENTANYL CITRATE 25 MCG: 50 INJECTION, SOLUTION INTRAMUSCULAR; INTRAVENOUS at 12:03

## 2024-05-21 RX ADMIN — DORZOLAMIDE HYDROCHLORIDE 1 DROP: 20 SOLUTION/ DROPS OPHTHALMIC at 09:48

## 2024-05-21 RX ADMIN — PANTOPRAZOLE SODIUM 40 MG: 40 TABLET, DELAYED RELEASE ORAL at 09:45

## 2024-05-21 RX ADMIN — DOCUSATE SODIUM 100 MG: 100 CAPSULE, LIQUID FILLED ORAL at 09:45

## 2024-05-21 RX ADMIN — TIMOLOL MALEATE 1 DROP: 5 SOLUTION OPHTHALMIC at 09:48

## 2024-05-21 RX ADMIN — CEFAZOLIN 2000 MG: 1 INJECTION, POWDER, FOR SOLUTION INTRAMUSCULAR; INTRAVENOUS at 12:03

## 2024-05-21 RX ADMIN — CARBOXYMETHYLCELLULOSE SODIUM 1 DROP: 10 GEL OPHTHALMIC at 09:45

## 2024-05-21 RX ADMIN — INSULIN GLARGINE 50 UNITS: 100 INJECTION, SOLUTION SUBCUTANEOUS at 23:58

## 2024-05-21 RX ADMIN — INSULIN LISPRO 2 UNITS: 100 INJECTION, SOLUTION INTRAVENOUS; SUBCUTANEOUS at 19:50

## 2024-05-21 RX ADMIN — TAMSULOSIN HYDROCHLORIDE 0.8 MG: 0.4 CAPSULE ORAL at 06:46

## 2024-05-21 RX ADMIN — SODIUM CHLORIDE, PRESERVATIVE FREE 10 ML: 5 INJECTION INTRAVENOUS at 09:46

## 2024-05-21 RX ADMIN — MIDODRINE HYDROCHLORIDE 10 MG: 5 TABLET ORAL at 09:44

## 2024-05-21 RX ADMIN — SODIUM CHLORIDE 250 ML: 9 INJECTION, SOLUTION INTRAVENOUS at 23:15

## 2024-05-21 RX ADMIN — PREGABALIN 50 MG: 25 CAPSULE ORAL at 09:45

## 2024-05-21 RX ADMIN — FUROSEMIDE 80 MG: 10 INJECTION, SOLUTION INTRAMUSCULAR; INTRAVENOUS at 09:46

## 2024-05-21 RX ADMIN — CARVEDILOL 3.12 MG: 3.12 TABLET, FILM COATED ORAL at 09:45

## 2024-05-21 RX ADMIN — DESMOPRESSIN ACETATE 32 MCG: 40 INJECTION, SOLUTION INTRAVENOUS; SUBCUTANEOUS at 20:59

## 2024-05-21 NOTE — PROGRESS NOTES
Physical Therapy  Facility/Department: [place] [address] PCU  Daily Treatment Note  NAME: Shay Townsend  : 1955  MRN: [id number]    Date of Service: 2024    Discharge Recommendations:  Subacute/Skilled Nursing Facility   PT Equipment Recommendations  Equipment Needed: No  Other: defer to next level of care.    Patient Diagnosis(es): The primary encounter diagnosis was Hypervolemia, unspecified hypervolemia type. Diagnoses of Troponin level elevated and Elevated brain natriuretic peptide (BNP) level were also pertinent to this visit.    Assessment   Assessment: Pt seen as co treat to safely progress mobility.  Pt requried A of 2 for bed mobility, STS and transfer to chair with RW.  Knees stronger today, no buckling. Pt O2 sats in upper 80's and recovered to 90% with cued breathing, pt stood 1 more time for static stand at walker X 1 min.   He performed a couple BLE legs exs but then started with a nose bleed and other exs were deferred for nursing care.  Pt is recommended for con't skilled PT and SNF at D/C.  Activity Tolerance: Patient limited by fatigue;Patient limited by endurance  Equipment Needed: No  Other: defer to next level of care.     Plan    Physical Therapy Plan  General Plan: 2-3 times per week  Current Treatment Recommendations: Strengthening;Balance training;Functional mobility training;Transfer training;Endurance training;Gait training;Neuromuscular re-education;Home exercise program;Safety education & training;Patient/Caregiver education & training;Equipment evaluation, education, & procurement;Therapeutic activities;Co-Treatment     Restrictions  Restrictions/Precautions  Restrictions/Precautions: General Precautions, Fall Risk, Up as Tolerated  Required Braces or Orthoses?: No  Position Activity Restriction  Other position/activity restrictions: 3L O2 nasal cannula, tele, catheter, low vision     Subjective    Subjective  Pain: Pt denies pain.  Orientation  Overall Orientation Status:

## 2024-05-21 NOTE — PROGRESS NOTES
Nephrology Progress Note   [facility]      This patient is a 69 year old male whom we are following for MARGARITO on CKD and fluid overload.      Subjective:  The patient was seen and examined. More awake today and conversant. Close friends in the room including the POA. Patient said that he decided to proceed with TDC placement and dialysis. UO=2.1L the past 24H.    Family History: Visitors at bedside  ROS: Weak, tired      Vitals:  BP (!) 110/57   Pulse 76   Temp 97.8 °F (36.6 °C) (Oral)   Resp 20   Ht 1.727 m (5' 8\")   Wt 122.4 kg (269 lb 14.4 oz)   SpO2 94%   BMI 41.04 kg/m²   I/O last 3 completed shifts:  In: 120 [P.O.:120]  Out: 3675 [Urine:3675]  No intake/output data recorded.    Physical Exam  Vitals reviewed.   Constitutional:       General: He is not in acute distress.  HENT:      Head: Normocephalic and atraumatic.      Mouth/Throat:      Mouth: Mucous membranes are moist.   Eyes:      General: No scleral icterus.     Conjunctiva/sclera: Conjunctivae normal.   Cardiovascular:      Rate and Rhythm: Normal rate.   Pulmonary:      Effort: Pulmonary effort is normal. No respiratory distress.      Breath sounds: Rales present. No wheezing.   Abdominal:      General: Bowel sounds are normal. There is no distension.      Tenderness: There is no abdominal tenderness.   Musculoskeletal:      Right lower leg: Edema present.      Left lower leg: Edema present.           Medications:   heparin (porcine)  5,000 Units SubCUTAneous 3 times per day    insulin glargine  50 Units SubCUTAneous Nightly    insulin lispro  0-8 Units SubCUTAneous TID     insulin lispro  0-4 Units SubCUTAneous Nightly    pregabalin  50 mg Oral Daily    timolol  1 drop Left Eye BID    brimonidine  1 drop Left Eye BID    dorzolamide  1 drop Left Eye BID    Netarsudil Dimesylate  1 drop Left Eye QPM    linaclotide  290 mcg Oral QAM AC    midodrine  10 mg Oral TID WC    furosemide  80 mg IntraVENous TID    allopurinol  300 mg Oral Daily

## 2024-05-21 NOTE — BRIEF OP NOTE
Brief Postoperative Note    Shay Townsend  YOB: 1955  [id number]    Pre-operative Diagnosis: CRF    Post-operative Diagnosis: Same    Procedure: Tunneled hemodialysis catheter placement    Anesthesia: Moderate Sedation    Surgeons: Nyasia Arredondo MD    Estimated Blood Loss: Less than 5 mL    Complications: None    Specimens: Was Not Obtained    Findings: Successful placement of a right IJ tunneled hemodialysis catheter.    Electronically signed by Nyasia Arredondo MD on 5/21/2024 at 11:53 AM

## 2024-05-21 NOTE — CONSULTS
Demonstrated understanding       [] No evidence of learning  [] Refused teaching         [x] N/A       Electronically signed by Malinda Aguilar RN, BSN on 5/21/2024 at 3:09 PM

## 2024-05-21 NOTE — OR NURSING
Image guided tunneled dialysis catheter insertion right IJ completed. Dr. Arredondo placed 14.5 [place] 19 cm [employer] tunneled dialysis catheter LOT # [id number] EXP 09/30/25. Secured with sutures. Blood return / flushes / Heparin locked with 1.6 per lumen. Dressing clean, dry, and intact. Pt tolerated procedure without any signs or symptoms of distress. Vital signs stable pt remains on 4L N.[place]. Report called to Rissa MATTA. Pt transported back to [facility] in stable condition via bed by RN.     Vital Signs  Vitals:    05/21/24 1216   BP: 122/72   Pulse: 64   Resp: 21   Temp:    SpO2: 95%    (vital signs in table format)    Post Luh  2 - Able to move 4 extremities voluntarily on command  2 - BP+/- 20mmHg of normal  2 - Fully awake  1 - Needs oxygen to maintain oxygen saturation >90%  2 - Able to breathe deeply and cough freely

## 2024-05-21 NOTE — OR NURSING
Pt arrived for image guided tunneled dialysis catheter insertion right IJ . Procedure explained including the risk and benefits of the procedure. All questions answered. Pt verbalizes understanding of the procedure and states no more questions. Consent confirmed. Vital signs stable. Labs, allergies, medications, and code status reviewed. No contraindications noted.     Vital Signs  Vitals:    05/21/24 1201   BP: (!) 143/50   Pulse: 63   Resp: 24   Temp:    SpO2: 95%    (vital signs in table format)    Pre Luh Score  2 - Able to move 4 extremities voluntarily on command  2 - BP+/- 20mmHg of normal  2 - Fully awake  1 - Needs oxygen to maintain oxygen saturation >90%  2 - Able to breathe deeply and cough freely    Allergies  Amitriptyline, Celecoxib, Cymbalta [duloxetine hcl], Elavil [amitriptyline hcl], Gabapentin, and Nsaids (allergies)    Labs  Lab Results   Component Value Date    INR 1.32 (H) 05/20/2024    PROTIME 16.6 (H) 05/20/2024     Lab Results   Component Value Date    CREATININE 2.1 (H) 05/21/2024     (HH) 05/21/2024     05/21/2024    K 3.8 05/21/2024    CL 93 (L) 05/21/2024    CO2 29 05/21/2024     Lab Results   Component Value Date    WBC 8.6 05/21/2024    HGB 12.7 (L) 05/21/2024    HCT 39.8 (L) 05/21/2024    MCV 86.4 05/21/2024     (L) 05/21/2024      Time out completed prior to procedure.  Pt was place supine on the IR table.  Pt was placed on all cardiac monitoring. Pt arrived on oxygen 4 L NC.

## 2024-05-21 NOTE — PRE SEDATION
Sedation Pre-Procedure Note    Patient Name: Shay Townsend   YOB: 1955  Room/Bed: 0447/0447-01  Medical Record Number: [id number]  Date: 5/21/2024   Time: 11:52 AM       Indication:  Tunneled Dialysis Catheter Placement    Consent: I have discussed with the patient and/or the patient representative the indication, alternatives, and the possible risks and/or complications of the planned procedure and the anesthesia methods. The patient and/or patient representative appear to understand and agree to proceed.    Vital Signs:   Vitals:    05/21/24 0932   BP:    Pulse: 76   Resp:    Temp:    SpO2: 94%       Past Medical History:   has a past medical history of Anemia, Angina, Arthritis, Atrial fibrillation (HCC), CAD (coronary artery disease), CHF (congestive heart failure) ([facility]), CKD (chronic kidney disease) stage 3, GFR 30-59 ml/min (HCC), Clostridium difficile diarrhea, Diabetes mellitus (HCC), Diabetic neuropathy (HCC), Disease of blood and blood forming organ, Dizziness, GERD (gastroesophageal reflux disease), Headache, Hearing loss, Hyperlipidemia, Hypertension, Kidney stone, Refusal of blood transfusions as patient is Bahai, Sleep apnea, Tinnitus, Venous ulcer (HCC), and Wound, open.    Past Surgical History:   has a past surgical history that includes hernia repair (age2); Cholecystectomy (9/2011); other surgical history (11-16-11 REPAIR LOWER STERNAL INCISION, REMOVAL OF ONE STERNAL WIRE,); Upper gastrointestinal endoscopy; Cardiac surgery; other surgical history (10/10/2014); Pain management procedure (N/A, 2/10/2022); Pain management procedure (N/A, 7/21/2022); and Knee Arthrocentesis (Right, 10/6/2022).    Medications:   Scheduled Meds:    ceFAZolin  2,000 mg IntraVENous Once    [Held by provider] heparin (porcine)  5,000 Units SubCUTAneous 3 times per day    insulin glargine  50 Units SubCUTAneous Nightly    insulin lispro  0-8 Units SubCUTAneous TID WC    insulin lispro  0-4

## 2024-05-21 NOTE — CONSULTS
Reason for Consult: garcia pulled out/large amounts of bleeding    History of Present Illness: Shay Townsend is a 69 y.o. male with extensive medical history below who had a 20lb weight gain since discharge on 5/10/24. He was then on IV lasix and a garcia catheter was in place. The patient was getting oob and the catheter came out fully inflated and patient had profusely been bleeding after this. At the time of my evaluation, there was a large clot noted to the meatus already. I was able to re-insert an 18F catheter with ease with some blood and urine output    ROS:  General: no fever or chills  CV: No chest pain  Pulm: No SOB  GI: No nausea, vomiting, diarrhea or constipation  Skin: No rash  Neuro: No headaches, dizziness or neurologic symptoms  : as above  Hematologic: no complaints  Endocrine: no complaints  Psych: no complaints    Past Medical History:   Past Medical History:   Diagnosis Date    Anemia     Angina     Arthritis     Atrial fibrillation (HCC)     CAD (coronary artery disease)     CHF (congestive heart failure) (HCC)     CKD (chronic kidney disease) stage 3, GFR 30-59 ml/min (HCC)     Clostridium difficile diarrhea 3/16/12; 2/29/12    positive stool toxin    Diabetes mellitus (HCC)     Diabetic neuropathy (HCC)     feet and legs    Disease of blood and blood forming organ     Dizziness     When he moves his head/ loses his balance    GERD (gastroesophageal reflux disease)     gastric ulcer    Headache     Hearing loss     Hyperlipidemia     Hypertension     Kidney stone 2002    Refusal of blood transfusions as patient is Scientology     Sleep apnea     Tinnitus     Venous ulcer (HCC)     LLE    Wound, open 01/13/2012       Past Surgical History:  Past Surgical History:   Procedure Laterality Date    CARDIAC SURGERY      Dec 27 2010, triple bypass    CHOLECYSTECTOMY  9/2011    HERNIA REPAIR  age2    IR TUNNELED CATHETER PLACEMENT GREATER THAN 5 YEARS  5/21/2024    IR TUNNELED  no lower extremity edema noted  Abdomen: firm, non-tender, distended, no masses  Skin: Skin color, texture, turgor normal, no rashes or lesions  Neurologic: no gross deficit   Male :  Penis appears normal and un circumcised  Urethral meatus is normal in size and location, blood oozing from meatus  Scrotum appears normal and both testicles appear normal in size and location    Labs:  CBC   Lab Results   Component Value Date/Time    WBC 8.6 05/21/2024 10:59 AM    RBC 4.60 05/21/2024 10:59 AM    HGB 12.7 05/21/2024 10:59 AM    HCT 39.8 05/21/2024 10:59 AM    MCV 86.4 05/21/2024 10:59 AM    MCH 27.6 05/21/2024 10:59 AM    MCHC 32.0 05/21/2024 10:59 AM    RDW 19.6 05/21/2024 10:59 AM     05/21/2024 10:59 AM    MPV 9.5 05/21/2024 10:59 AM     BMP   Lab Results   Component Value Date/Time     05/21/2024 05:08 AM    K 3.8 05/21/2024 05:08 AM    K 4.6 05/17/2024 06:41 PM    CL 93 05/21/2024 05:08 AM    CO2 29 05/21/2024 05:08 AM     05/21/2024 05:08 AM    CREATININE 2.1 05/21/2024 05:08 AM    GLUCOSE 321 05/21/2024 05:08 AM    CALCIUM 9.2 05/21/2024 05:08 AM       Urinalysis:   Lab Results   Component Value Date/Time    COLORU RED 05/20/2024 03:50 AM    GLUCOSEU Color Interfer 05/20/2024 03:50 AM    GLUCOSEU NEGATIVE 02/29/2012 03:17 PM    BLOODU Color Interfer 05/20/2024 03:50 AM    NITRU Color Interfer 05/20/2024 03:50 AM    LEUKOCYTESUR Color Interfer 05/20/2024 03:50 AM       Imaging:  NA    Impression/Plan:   - 69y.o. male admitted with CHF exacerbation. Consulted for traumatic removal of garcia.  - Blood clotting at meatus, 18F garcia catheter was replaced with ease. Hematuria will be expected with this trauma. Aspirin has been on hold for several days, continue to hold this.     Sheri Joaquin, APRN - CNP 5/21/20244:30 PM

## 2024-05-21 NOTE — CARE COORDINATION
Writer met with pt (did not talk, appears to be sleeping), pt's TRACE Lisa, and some other friends bedside.  Plan is for HD, pt to get TDC.  Per Nephro, pt will need OP HD, writer initiating [employer] portal for OP slot closest to pt's home at [facility].  [facility] will have to arrange transport once OP HD time confirmed, CM will be in touch with Rita/elis.  PEYMAN Wilkes-RN

## 2024-05-21 NOTE — PROGRESS NOTES
Patient at the end of night shift not clearly demonstrating that he is capable of making informed medical decisions on his own. Had heard during report that patient had initially refused to have vascath placed for dialysis, but then patient had a change of heart and would like the vascath to be placed after all. Patient's POA is Sloan Dey (cell no. [phone]). Spoke with Yissel about patient possibly getting vascath for dialysis but not being fully capable of making decisions for himself. Yissel shared that patient had indeed said that he wanted the vascath for dialysis after all, and that he would be by later on today along with other Anglican friends to come see the patient and discuss vascath with medical team.

## 2024-05-21 NOTE — PROGRESS NOTES
Large amount of nelida blood from around the garcia.  Taoist.  BP soft, patient already volume overloaded.  , will give DDAVP x1.  RN reached out to urology.

## 2024-05-21 NOTE — CARE COORDINATION
Chart reviewed, iRana/Palliative aware of new referral but writer updated her on latest note by RN re: pt now wanting vascath and POA coming to bedside later today, writer will see before Palliative.  TAMIA Wilkes     0727 Addendum:  Writer spoke with Rita/Karen Isabel, pt can return without any barriers, has bed hold.  Karen Isabel aware pt leaning towards HD now, they would have to arrange transport to OP HD.  Of note, if pt chooses hospice in the future, [facility] is the only agency that can go to [facility].  TAMIA Wilkes

## 2024-05-21 NOTE — PROGRESS NOTES
Patient was found by staff to be out of bed without assistance. Patient had removed garcia catheter with significant amount of bleeding. Pressure held on site of bleeding, urology consulted and en route to bedside. New virtual  orders placed. Attending MD notified, aware. Standard safety measures in place, bed alarm on, call light and bedside table within reach.

## 2024-05-21 NOTE — PROGRESS NOTES
Hospital Medicine Progress Note      Date of Admission: 5/17/2024  Hospital Day: 5    Chief Admission Complaint:  Dyspnea     Subjective: Patient is in hospital bed awake and alert.  New complaints of pain at catheter site.  Pain started today, without provocation. Urine seems to be clearer than yesterday. Denies pain elsewhere. Denies fevers, chills, sweats. Denies chest pain & palpitations. Denies shortness of breath and cough. Denies nausea, vomiting, or diarrhea. Spoke with nursing staff and patient had nose bleed in AM, currently controlled.     Presenting Admission History:       69-year-old male  with a PMH of chronic kidney disease, diastolic congestive heart failure, CAD, CABG, diabetes mellitus type 2, hypertension, hyperlipidemia.     He does have history of cardiorenal syndrome, he has had multiple hospitalizations for decompensated heart failure despite being on high dose diuretics. EF slightly reduced but severe pulmonary hypertension with dilated right and left atria. Scr is 2.1. Recent admission and lost 20 lbs but gained it right back. Now 263 lbs with discharge weight of 247 lbs.     He was admitted to [facility] from 5/2 to 5/10/2024 for acute on chronic diastolic heart failure with associated hypoxic respiratory failure.  He was discharged to the [facility] and over the past week he gained significant weight..  He was having increased lower extremity swelling, abdominal distention or some shortness of breath.  He was sent from the F back to the ED for further evaluation.  Chest x-ray did reveal vascular congestion, and he was admitted for further management and evaluation.    Assessment/Plan:      Current Principal Problem:  Heart failure (HCC)    Acute Respiratory Failure - w/ hypoxia, POArrival.  Presence of clinical respiratory distress w/ tachypnea/dyspnea/SOB and wheezing w/ use of accessory muscles to breath.  Supplemental O2 and wean as tolerated.   -Improved    CHF - acute on chronic

## 2024-05-21 NOTE — PROGRESS NOTES
Occupational Therapy  Facility/Department: [facility] C4 PCU  Daily Treatment Note  NAME: Shay Townsend  : 1955  MRN: [id number]    Date of Service: 2024    Discharge Recommendations:  Subacute/Skilled Nursing Facility       Therapy discharge recommendations are subject to collaboration from the patient’s interdisciplinary healthcare team, including MD and case management recommendations.  Barriers to Home Discharge:   [] Steps to access home entry or bed/bath   [x] Unable to transfer, ambulate, or propel wheelchair household distances without assist   [x] Limited available assist at home upon discharge    [] Patient or family requests d/c to post-acute facility    [] Poor cognition/safety awareness for d/c to home alone    [] Unable to maintain ordered weight bearing status    [] Patient with salient signs of long-standing immobility   [x] Decreased independence with ADLs   [x] Increased risk for falls   [] Other:    Patient Diagnosis(es): The primary encounter diagnosis was Hypervolemia, unspecified hypervolemia type. Diagnoses of Troponin level elevated and Elevated brain natriuretic peptide (BNP) level were also pertinent to this visit.     Assessment    Assessment: Pt tolerated session fair overall, requires increased time for most tasks, delayed responses and rest breaks due to fatigue. Pt with nose bleed during session following blowing nose multiple times (RN notified and present at EOS). Pt fatigues quickly with therex and transfer training. Co-tx collaboration this date with PT staff to safely progress pt toward goals. Pt will have better occupational performance outcomes within a co-treatment than 1:1 session. Pt functioning below his baseline, recommend SNF at d/c.   Activity Tolerance: Patient limited by fatigue;Patient limited by endurance  Discharge Recommendations: Subacute/Skilled Nursing Facility      Plan   Occupational Therapy Plan  Times Per Week: 2-4x/week

## 2024-05-22 LAB
ANION GAP SERPL CALCULATED.3IONS-SCNC: 16 MMOL/L (ref 3–16)
BASE EXCESS BLDA CALC-SCNC: 3.4 MMOL/L (ref -3–3)
BUN SERPL-MCNC: 136 MG/DL (ref 7–20)
CALCIUM SERPL-MCNC: 9.4 MG/DL (ref 8.3–10.6)
CHLORIDE SERPL-SCNC: 95 MMOL/L (ref 99–110)
CO2 BLDA-SCNC: 31.1 MMOL/L
CO2 SERPL-SCNC: 29 MMOL/L (ref 21–32)
COHGB MFR BLDA: 2 % (ref 0–1.5)
CREAT SERPL-MCNC: 1.8 MG/DL (ref 0.8–1.3)
DEPRECATED RDW RBC AUTO: 19.2 % (ref 12.4–15.4)
GFR SERPLBLD CREATININE-BSD FMLA CKD-EPI: 40 ML/MIN/{1.73_M2}
GLUCOSE BLD-MCNC: 211 MG/DL (ref 70–99)
GLUCOSE BLD-MCNC: 219 MG/DL (ref 70–99)
GLUCOSE BLD-MCNC: 238 MG/DL (ref 70–99)
GLUCOSE BLD-MCNC: 238 MG/DL (ref 70–99)
GLUCOSE SERPL-MCNC: 215 MG/DL (ref 70–99)
HAV IGM SERPL QL IA: NORMAL
HBV CORE IGM SERPL QL IA: NORMAL
HBV SURFACE AB SERPL IA-ACNC: 183.7 MIU/ML
HBV SURFACE AG SERPL QL IA: NORMAL
HCO3 BLDA-SCNC: 29.5 MMOL/L (ref 21–29)
HCT VFR BLD AUTO: 36.1 % (ref 40.5–52.5)
HCV AB SERPL QL IA: NORMAL
HGB BLD-MCNC: 11.7 G/DL (ref 13.5–17.5)
HGB BLDA-MCNC: 12.5 G/DL (ref 13.5–17.5)
MAGNESIUM SERPL-MCNC: 2.6 MG/DL (ref 1.8–2.4)
MCH RBC QN AUTO: 27.8 PG (ref 26–34)
MCHC RBC AUTO-ENTMCNC: 32.5 G/DL (ref 31–36)
MCV RBC AUTO: 85.5 FL (ref 80–100)
METHGB MFR BLDA: 0.3 %
O2 THERAPY: ABNORMAL
PCO2 BLDA: 51 MMHG (ref 35–45)
PERFORMED ON: ABNORMAL
PH BLDA: 7.38 [PH] (ref 7.35–7.45)
PLATELET # BLD AUTO: 123 K/UL (ref 135–450)
PMV BLD AUTO: 9 FL (ref 5–10.5)
PO2 BLDA: 66.3 MMHG (ref 75–108)
POTASSIUM SERPL-SCNC: 3.3 MMOL/L (ref 3.5–5.1)
RBC # BLD AUTO: 4.22 M/UL (ref 4.2–5.9)
SAO2 % BLDA: 92.9 %
SODIUM SERPL-SCNC: 140 MMOL/L (ref 136–145)
WBC # BLD AUTO: 9.2 K/UL (ref 4–11)

## 2024-05-22 PROCEDURE — 80074 ACUTE HEPATITIS PANEL: CPT

## 2024-05-22 PROCEDURE — 6370000000 HC RX 637 (ALT 250 FOR IP): Performed by: STUDENT IN AN ORGANIZED HEALTH CARE EDUCATION/TRAINING PROGRAM

## 2024-05-22 PROCEDURE — 36600 WITHDRAWAL OF ARTERIAL BLOOD: CPT

## 2024-05-22 PROCEDURE — 2060000000 HC ICU INTERMEDIATE R&B

## 2024-05-22 PROCEDURE — 83735 ASSAY OF MAGNESIUM: CPT

## 2024-05-22 PROCEDURE — 36415 COLL VENOUS BLD VENIPUNCTURE: CPT

## 2024-05-22 PROCEDURE — 51702 INSERT TEMP BLADDER CATH: CPT

## 2024-05-22 PROCEDURE — 6370000000 HC RX 637 (ALT 250 FOR IP): Performed by: NURSE PRACTITIONER

## 2024-05-22 PROCEDURE — 85027 COMPLETE CBC AUTOMATED: CPT

## 2024-05-22 PROCEDURE — 6360000002 HC RX W HCPCS: Performed by: INTERNAL MEDICINE

## 2024-05-22 PROCEDURE — 86704 HEP B CORE ANTIBODY TOTAL: CPT

## 2024-05-22 PROCEDURE — 2580000003 HC RX 258: Performed by: INTERNAL MEDICINE

## 2024-05-22 PROCEDURE — 86706 HEP B SURFACE ANTIBODY: CPT

## 2024-05-22 PROCEDURE — 94761 N-INVAS EAR/PLS OXIMETRY MLT: CPT

## 2024-05-22 PROCEDURE — 6370000000 HC RX 637 (ALT 250 FOR IP)

## 2024-05-22 PROCEDURE — 6370000000 HC RX 637 (ALT 250 FOR IP): Performed by: INTERNAL MEDICINE

## 2024-05-22 PROCEDURE — 90935 HEMODIALYSIS ONE EVALUATION: CPT

## 2024-05-22 PROCEDURE — 2700000000 HC OXYGEN THERAPY PER DAY

## 2024-05-22 PROCEDURE — 6360000002 HC RX W HCPCS: Performed by: NURSE PRACTITIONER

## 2024-05-22 PROCEDURE — 80048 BASIC METABOLIC PNL TOTAL CA: CPT

## 2024-05-22 PROCEDURE — 82803 BLOOD GASES ANY COMBINATION: CPT

## 2024-05-22 RX ORDER — POTASSIUM CHLORIDE 7.45 MG/ML
10 INJECTION INTRAVENOUS PRN
Status: DISCONTINUED | OUTPATIENT
Start: 2024-05-22 | End: 2024-05-25 | Stop reason: HOSPADM

## 2024-05-22 RX ORDER — HEPARIN SODIUM 1000 [USP'U]/ML
3200 INJECTION, SOLUTION INTRAVENOUS; SUBCUTANEOUS AS NEEDED
Status: DISCONTINUED | OUTPATIENT
Start: 2024-05-22 | End: 2024-05-25 | Stop reason: HOSPADM

## 2024-05-22 RX ORDER — POTASSIUM CHLORIDE 20 MEQ/1
40 TABLET, EXTENDED RELEASE ORAL ONCE
Status: DISCONTINUED | OUTPATIENT
Start: 2024-05-22 | End: 2024-05-24

## 2024-05-22 RX ORDER — MIDODRINE HYDROCHLORIDE 5 MG/1
TABLET ORAL
Status: COMPLETED
Start: 2024-05-22 | End: 2024-05-22

## 2024-05-22 RX ORDER — POTASSIUM CHLORIDE 20 MEQ/1
40 TABLET, EXTENDED RELEASE ORAL PRN
Status: DISCONTINUED | OUTPATIENT
Start: 2024-05-22 | End: 2024-05-25 | Stop reason: HOSPADM

## 2024-05-22 RX ADMIN — BRIMONIDINE TARTRATE 1 DROP: 2 SOLUTION OPHTHALMIC at 00:18

## 2024-05-22 RX ADMIN — SODIUM CHLORIDE, PRESERVATIVE FREE 10 ML: 5 INJECTION INTRAVENOUS at 09:02

## 2024-05-22 RX ADMIN — HEPARIN SODIUM 3200 UNITS: 1000 INJECTION INTRAVENOUS; SUBCUTANEOUS at 14:06

## 2024-05-22 RX ADMIN — MORPHINE SULFATE 2 MG: 2 INJECTION, SOLUTION INTRAMUSCULAR; INTRAVENOUS at 18:12

## 2024-05-22 RX ADMIN — DOCUSATE SODIUM 100 MG: 100 CAPSULE, LIQUID FILLED ORAL at 00:12

## 2024-05-22 RX ADMIN — FUROSEMIDE 80 MG: 10 INJECTION, SOLUTION INTRAMUSCULAR; INTRAVENOUS at 20:37

## 2024-05-22 RX ADMIN — MIDODRINE HYDROCHLORIDE 10 MG: 5 TABLET ORAL at 17:51

## 2024-05-22 RX ADMIN — FUROSEMIDE 80 MG: 10 INJECTION, SOLUTION INTRAMUSCULAR; INTRAVENOUS at 08:57

## 2024-05-22 RX ADMIN — POTASSIUM BICARBONATE 40 MEQ: 782 TABLET, EFFERVESCENT ORAL at 08:57

## 2024-05-22 RX ADMIN — SENNOSIDES 8.6 MG: 8.6 TABLET, FILM COATED ORAL at 00:12

## 2024-05-22 RX ADMIN — ATORVASTATIN CALCIUM 80 MG: 80 TABLET, FILM COATED ORAL at 00:12

## 2024-05-22 RX ADMIN — BRIMONIDINE TARTRATE 1 DROP: 2 SOLUTION OPHTHALMIC at 21:34

## 2024-05-22 RX ADMIN — TIMOLOL MALEATE 1 DROP: 5 SOLUTION OPHTHALMIC at 09:21

## 2024-05-22 RX ADMIN — ATORVASTATIN CALCIUM 80 MG: 80 TABLET, FILM COATED ORAL at 20:36

## 2024-05-22 RX ADMIN — DORZOLAMIDE HYDROCHLORIDE 1 DROP: 20 SOLUTION/ DROPS OPHTHALMIC at 09:21

## 2024-05-22 RX ADMIN — FUROSEMIDE 80 MG: 10 INJECTION, SOLUTION INTRAMUSCULAR; INTRAVENOUS at 17:50

## 2024-05-22 RX ADMIN — SODIUM CHLORIDE, PRESERVATIVE FREE 10 ML: 5 INJECTION INTRAVENOUS at 20:39

## 2024-05-22 RX ADMIN — TIMOLOL MALEATE 1 DROP: 5 SOLUTION OPHTHALMIC at 00:17

## 2024-05-22 RX ADMIN — DORZOLAMIDE HYDROCHLORIDE 1 DROP: 20 SOLUTION/ DROPS OPHTHALMIC at 00:17

## 2024-05-22 RX ADMIN — CARBOXYMETHYLCELLULOSE SODIUM 1 DROP: 10 GEL OPHTHALMIC at 00:16

## 2024-05-22 RX ADMIN — PREGABALIN 50 MG: 25 CAPSULE ORAL at 09:01

## 2024-05-22 RX ADMIN — CARBOXYMETHYLCELLULOSE SODIUM 1 DROP: 10 GEL OPHTHALMIC at 09:02

## 2024-05-22 RX ADMIN — CARBOXYMETHYLCELLULOSE SODIUM 1 DROP: 10 GEL OPHTHALMIC at 17:56

## 2024-05-22 RX ADMIN — INSULIN LISPRO 2 UNITS: 100 INJECTION, SOLUTION INTRAVENOUS; SUBCUTANEOUS at 17:52

## 2024-05-22 RX ADMIN — MIDODRINE HYDROCHLORIDE 10 MG: 5 TABLET ORAL at 13:16

## 2024-05-22 RX ADMIN — MIDODRINE HYDROCHLORIDE 10 MG: 5 TABLET ORAL at 09:00

## 2024-05-22 RX ADMIN — PANTOPRAZOLE SODIUM 40 MG: 40 TABLET, DELAYED RELEASE ORAL at 08:58

## 2024-05-22 RX ADMIN — CARVEDILOL 3.12 MG: 3.12 TABLET, FILM COATED ORAL at 09:01

## 2024-05-22 RX ADMIN — TAMSULOSIN HYDROCHLORIDE 0.8 MG: 0.4 CAPSULE ORAL at 05:37

## 2024-05-22 RX ADMIN — TIMOLOL MALEATE 1 DROP: 5 SOLUTION OPHTHALMIC at 21:34

## 2024-05-22 RX ADMIN — OXYCODONE AND ACETAMINOPHEN 1 TABLET: 5; 325 TABLET ORAL at 23:03

## 2024-05-22 RX ADMIN — INSULIN LISPRO 2 UNITS: 100 INJECTION, SOLUTION INTRAVENOUS; SUBCUTANEOUS at 08:58

## 2024-05-22 RX ADMIN — BRIMONIDINE TARTRATE 1 DROP: 2 SOLUTION OPHTHALMIC at 09:21

## 2024-05-22 RX ADMIN — INSULIN GLARGINE 50 UNITS: 100 INJECTION, SOLUTION SUBCUTANEOUS at 20:36

## 2024-05-22 RX ADMIN — CARBOXYMETHYLCELLULOSE SODIUM 1 DROP: 10 GEL OPHTHALMIC at 20:37

## 2024-05-22 RX ADMIN — DORZOLAMIDE HYDROCHLORIDE 1 DROP: 20 SOLUTION/ DROPS OPHTHALMIC at 21:34

## 2024-05-22 RX ADMIN — ALLOPURINOL 300 MG: 300 TABLET ORAL at 09:01

## 2024-05-22 RX ADMIN — FUROSEMIDE 80 MG: 10 INJECTION, SOLUTION INTRAMUSCULAR; INTRAVENOUS at 00:37

## 2024-05-22 RX ADMIN — SENNOSIDES 8.6 MG: 8.6 TABLET, FILM COATED ORAL at 09:01

## 2024-05-22 ASSESSMENT — PAIN SCALES - GENERAL
PAINLEVEL_OUTOF10: 7
PAINLEVEL_OUTOF10: 7
PAINLEVEL_OUTOF10: 8
PAINLEVEL_OUTOF10: 5
PAINLEVEL_OUTOF10: 8
PAINLEVEL_OUTOF10: 8

## 2024-05-22 ASSESSMENT — PAIN DESCRIPTION - LOCATION
LOCATION: ABDOMEN
LOCATION: ABDOMEN

## 2024-05-22 ASSESSMENT — PAIN - FUNCTIONAL ASSESSMENT: PAIN_FUNCTIONAL_ASSESSMENT: PREVENTS OR INTERFERES WITH MANY ACTIVE NOT PASSIVE ACTIVITIES

## 2024-05-22 NOTE — PLAN OF CARE
Problem: ABCDS Injury Assessment  Goal: Absence of physical injury  Outcome: Not Progressing   -Tissue injury from self-inflicted garcia removal.  Problem: Discharge Planning  Goal: Discharge to home or other facility with appropriate resources  Outcome: Progressing     Problem: Pain  Goal: Verbalizes/displays adequate comfort level or baseline comfort level  Outcome: Progressing     Problem: Skin/Tissue Integrity  Goal: Absence of new skin breakdown  Description: 1.  Monitor for areas of redness and/or skin breakdown  2.  Assess vascular access sites hourly  3.  Every 4-6 hours minimum:  Change oxygen saturation probe site  4.  Every 4-6 hours:  If on nasal continuous positive airway pressure, respiratory therapy assess nares and determine need for appliance change or resting period.  Outcome: Progressing     Problem: Safety - Adult  Goal: Free from fall injury  Outcome: Progressing     Problem: Chronic Conditions and Co-morbidities  Goal: Patient's chronic conditions and co-morbidity symptoms are monitored and maintained or improved  Outcome: Progressing   CHF Care Plan      Patient's EF (Ejection Fraction) is greater than 40%    Heart Failure Medications:  Diuretics:: Furosemide    (One of the following REQUIRED for EF </= 40%/SYSTOLIC FAILURE but MAY be used in EF% >40%/DIASTOLIC FAILURE)        ACE:: None        ARB:: None         ARNI:: None    (Beta Blockers)  NON- Evidenced Based Beta Blocker (for EF% >40%/DIASTOLIC FAILURE): None    Evidenced Based Beta Blocker::(REQUIRED for EF% <40%/SYSTOLIC FAILURE) None  ...................................................................................................................................................    Failed to redirect to the Timeline version of the [employer] SmartLink.      Patient's weights and intake/output reviewed    Daily Weight log at bedside, patient/family participation in use of log: pt unable to participate\" (Not alert and  oriented)    Patient's current weight today shows a difference of 10 lbs more than last documented weight.      Intake/Output Summary (Last 24 hours) at 5/21/2024 2133  Last data filed at 5/21/2024 2006  Gross per 24 hour   Intake --   Output 1400 ml   Net -1400 ml       Education Booklet Provided: yes    Comorbidities Reviewed Yes    Patient has a past medical history of Anemia, Angina, Arthritis, Atrial fibrillation (HCC), CAD (coronary artery disease), CHF (congestive heart failure) (HCC), CKD (chronic kidney disease) stage 3, GFR 30-59 ml/min (HCC), Clostridium difficile diarrhea, Diabetes mellitus (HCC), Diabetic neuropathy (HCC), Disease of blood and blood forming organ, Dizziness, GERD (gastroesophageal reflux disease), Headache, Hearing loss, Hyperlipidemia, Hypertension, Kidney stone, Refusal of blood transfusions as patient is Confucianist, Sleep apnea, Tinnitus, Venous ulcer (HCC), and Wound, open.     >>For CHF and Comorbidity documentation on Education Time and Topics, please see Education Tab      Pt resting in bed at this time on  4 L O2. Pt denies shortness of breath. Pt with pitting lower extremity edema.     Patient and/or Family's stated Goal of Care this Admission: reduce shortness of breath, increase activity tolerance, better understand heart failure and disease management, be more comfortable, and reduce lower extremity edema prior to discharge        :

## 2024-05-22 NOTE — PROGRESS NOTES
Treatment time: 2 hrs    Net UF: 500 ml    Pre weight: 115.5 kg  Post weight: 1115 kg  EDW: TBD    Access used: Rt CW TDC  Access function: Well tolerated, 200 BFR    Medications or blood products given: Midodrine 10 mg, Heparin dwells    Regular outpatient schedule: TBD, MARGARITO    Summary of response to treatment: Pt tolerated first treatment well. Pt remained stable throughout entire treatment and upon exiting the hemodialysis suite.     Copy of dialysis treatment record placed in chart, to be scanned into EMR.

## 2024-05-22 NOTE — CARE COORDINATION
Writer uploaded hepatitis labs to [facility] portal.  Timing for first OP HD still unclear.  Per [facility] portal, tentatively OP slot of T/T/S Early Morning at [facility].  Karen Painters following, will need to schedule transport once time confirmed.  Writer aware Urology now involved too, after traumatic garcia removal.  CM will continue to follow pt's progress and coordinate discharge arrangements as appropriate.  PEYMAN Wilkes-SIGRID

## 2024-05-22 NOTE — PROGRESS NOTES
Hospital Medicine Progress Note      Date of Admission: 5/17/2024  Hospital Day: 6    Chief Admission Complaint:  Dyspnea     Subjective: Patient is in hospital bed alert and awake.  Continues to have penile pain where he took out his catheter.  Continues to state that he does not know how this happened.  Denies any other pain at this time.  Informed by nursing staff that patient had pulled his catheter and had significant bleeding from the urethral meatus.  Rapid response was called.  Patient stable at that time.    Presenting Admission History:       69-year-old male  with a PMH of chronic kidney disease, diastolic congestive heart failure, CAD, CABG, diabetes mellitus type 2, hypertension, hyperlipidemia.     He does have history of cardiorenal syndrome, he has had multiple hospitalizations for decompensated heart failure despite being on high dose diuretics. EF slightly reduced but severe pulmonary hypertension with dilated right and left atria. Scr is 2.1. Recent admission and lost 20 lbs but gained it right back. Now 263 lbs with discharge weight of 247 lbs.     He was admitted to [facility] from 5/2 to 5/10/2024 for acute on chronic diastolic heart failure with associated hypoxic respiratory failure.  He was discharged to the [facility] and over the past week he gained significant weight..  He was having increased lower extremity swelling, abdominal distention or some shortness of breath.  He was sent from the F back to the ED for further evaluation.  Chest x-ray did reveal vascular congestion, and he was admitted for further management and evaluation.    Assessment/Plan:      Current Principal Problem:  Heart failure (HCC)    Acute Respiratory Failure - w/ hypoxia, POArrival.  Presence of clinical respiratory distress w/ tachypnea/dyspnea/SOB and wheezing w/ use of accessory muscles to breath.  Supplemental O2 and wean as tolerated.   -Improved    CHF - acute on chronic diastolic failure w/ preserved EF  03/07/2022 04:34 PM     Organism:   Lab Results   Component Value Date/Time    ORG Pseudomonas aeruginosa 09/18/2020 03:23 PM    ORG Enterococcus faecalis 09/18/2020 03:23 PM         Danilo Porter DO

## 2024-05-22 NOTE — CODE DOCUMENTATION
Called [facility] urology after hours service line Dr. Harsh Matos at [phone]  Left message and gave call back number.    Shon Salazar  Time : 10:55 pm  Date : 05/21/2024

## 2024-05-22 NOTE — PROGRESS NOTES
Pt Name: Shay Townsend  Medical Record Number: [id number]  Date of Birth 1955   Today's Date: 5/22/2024      Subjective:  More awake and alert today, eating breakfast. Has no recollection of the garcia catheter issue yesterday.     ROS: Constitutional: No fever    Vitals:  Vitals:    05/22/24 0050 05/22/24 0500 05/22/24 0550 05/22/24 0854   BP:   122/60 127/64   Pulse:   82 75   Resp:   22 22   Temp:    98.4 °F (36.9 °C)   TempSrc:    Oral   SpO2: 93%  92% 94%   Weight:  115.5 kg (254 lb 10.1 oz)     Height:         I/O last 3 completed shifts:  In: 160 [Other:160]  Out: 2450 [Urine:2450]    Exam:  General: Awake, oriented, no acute distress  Respiratory: Nonlabored breathing  Abdomen: Soft, non-tender, non-distended, no masses  : garcia catheter intact with yellow output, blood continues to ooze around meatus - some clots noted oozing around catheter which is expected  Skin: Skin color, texture, turgor normal, no rashes or lesions  Neurologic: no gross deficits    CURRENT MEDICATIONS   Scheduled Meds:   potassium chloride  40 mEq Oral Once    ceFAZolin  2,000 mg IntraVENous Once    [Held by provider] heparin (porcine)  5,000 Units SubCUTAneous 3 times per day    insulin glargine  50 Units SubCUTAneous Nightly    insulin lispro  0-8 Units SubCUTAneous TID WC    insulin lispro  0-4 Units SubCUTAneous Nightly    pregabalin  50 mg Oral Daily    timolol  1 drop Left Eye BID    brimonidine  1 drop Left Eye BID    dorzolamide  1 drop Left Eye BID    Netarsudil Dimesylate  1 drop Left Eye QPM    linaclotide  290 mcg Oral QAM AC    midodrine  10 mg Oral TID WC    furosemide  80 mg IntraVENous TID    allopurinol  300 mg Oral Daily    [Held by provider] aspirin  81 mg Oral Daily    atorvastatin  80 mg Oral Nightly    carboxymethylcellulose PF  1 drop Both Eyes TID    carvedilol  3.125 mg Oral BID WC    docusate sodium  100 mg Oral BID    pantoprazole  40 mg Oral Daily    senna  1 tablet Oral BID    tamsulosin  0.8  mg Oral Daily    sodium chloride flush  5-40 mL IntraVENous 2 times per day     Continuous Infusions:   sodium chloride      dextrose       PRN Meds:.potassium chloride **OR** potassium alternative oral replacement **OR** potassium chloride, morphine, oxyCODONE-acetaminophen, lidocaine, sodium chloride flush, sodium chloride, ondansetron **OR** ondansetron, polyethylene glycol, acetaminophen **OR** acetaminophen, magnesium sulfate, glucose, dextrose bolus **OR** dextrose bolus, glucagon (rDNA), dextrose    LABS     Recent Labs     05/20/24  0558 05/20/24  0558 05/20/24  0748 05/21/24  0508 05/21/24  1059 05/21/24  2252 05/22/24  0521   WBC  --    < >  --  8.8 8.6 10.7 9.2   HGB  --    < >  --  13.2* 12.7* 12.2* 11.7*   HCT  --    < >  --  40.9 39.8* 38.1* 36.1*   PLT  --    < >  --  143 123* 123* 123*     --   --  138  --  137 140   K 3.4*  --   --  3.8  --  3.4* 3.3*   CL 95*  --   --  93*  --  95* 95*   CO2 26  --   --  29  --  27 29   *  --   --  139*  --  141* 136*   CREATININE 2.1*  --   --  2.1*  --  1.8* 1.8*   MG 2.80*  --   --  2.70*  --  2.70* 2.60*   PHOS 5.6*  --   --   --   --   --   --    CALCIUM 9.4  --   --  9.2  --  9.2 9.4   INR  --   --  1.32*  --   --   --   --    AST  --   --   --   --   --  14*  --    ALT  --   --   --   --   --  6*  --    BILITOT  --   --   --   --   --  1.0  --     < > = values in this interval not displayed.       ASSESSMENT   Hospital day # 5  69y.o. male admitted with CHF exacerbation. Consulted for traumatic removal of garcia catheter yesterday.  18F garcia catheter was replaced with ease by me  PLAN   Urine within the garcia catheter is yellow. He continues to have bleeding and clots around the meatus, this is expected and will gradually resolve, even having blood within the garcia catheter may be expected. Continue to apply gauze and pressure to the meatal site, change dressings as needed. Continue to hold aspirin.     Sheri Joaquin, APRN - CNP 5/22/2024

## 2024-05-22 NOTE — PROGRESS NOTES
Nephrology Progress Note   [employer]      This patient is a 69 year old male whom we are following for MARGARITO on CKD and fluid overload.      Subjective:  The patient was seen and examined. Events overnight reviewed. Had TDC placed yesterday. Patient was confused and removed garcia last night, had gross hematuria which seems to be clearing now.    Family History: No visitors at bedside  ROS: Confused      Vitals:  /60   Pulse 82   Temp 98.4 °F (36.9 °C)   Resp 22   Ht 1.727 m (5' 8\")   Wt 115.5 kg (254 lb 10.1 oz)   SpO2 92%   BMI 38.72 kg/m²   I/O last 3 completed shifts:  In: 160 [Other:160]  Out: 2450 [Urine:2450]  No intake/output data recorded.    Physical Exam  Vitals reviewed.   Constitutional:       General: He is not in acute distress.  HENT:      Head: Normocephalic and atraumatic.      Mouth/Throat:      Mouth: Mucous membranes are moist.   Eyes:      General: No scleral icterus.     Conjunctiva/sclera: Conjunctivae normal.   Cardiovascular:      Rate and Rhythm: Normal rate.   Pulmonary:      Effort: Pulmonary effort is normal. No respiratory distress.      Breath sounds: Rales present. No wheezing.   Abdominal:      General: Bowel sounds are normal. There is no distension.      Tenderness: There is no abdominal tenderness.   Musculoskeletal:      Right lower leg: Edema present.      Left lower leg: Edema present.           Medications:   potassium chloride  40 mEq Oral Once    ceFAZolin  2,000 mg IntraVENous Once    [Held by provider] heparin (porcine)  5,000 Units SubCUTAneous 3 times per day    insulin glargine  50 Units SubCUTAneous Nightly    insulin lispro  0-8 Units SubCUTAneous TID     insulin lispro  0-4 Units SubCUTAneous Nightly    pregabalin  50 mg Oral Daily    timolol  1 drop Left Eye BID    brimonidine  1 drop Left Eye BID    dorzolamide  1 drop Left Eye BID    Netarsudil Dimesylate  1 drop Left Eye QPM    linaclotide  290 mcg Oral QAM AC    midodrine  10 mg Oral TID      furosemide  80 mg IntraVENous TID    allopurinol  300 mg Oral Daily    [Held by provider] aspirin  81 mg Oral Daily    atorvastatin  80 mg Oral Nightly    carboxymethylcellulose PF  1 drop Both Eyes TID    carvedilol  3.125 mg Oral BID WC    docusate sodium  100 mg Oral BID    pantoprazole  40 mg Oral Daily    senna  1 tablet Oral BID    tamsulosin  0.8 mg Oral Daily    sodium chloride flush  5-40 mL IntraVENous 2 times per day         Labs:  Recent Labs     05/21/24  1059 05/21/24  2252 05/22/24  0521   WBC 8.6 10.7 9.2   HGB 12.7* 12.2* 11.7*   HCT 39.8* 38.1* 36.1*   MCV 86.4 86.2 85.5   * 123* 123*       Recent Labs     05/20/24  0558 05/21/24  0508 05/21/24  2252 05/22/24  0521    138 137 140   K 3.4* 3.8 3.4* 3.3*   CL 95* 93* 95* 95*   CO2 26 29 27 29   GLUCOSE 206* 321* 221* 215*   PHOS 5.6*  --   --   --    MG 2.80* 2.70* 2.70* 2.60*   * 139* 141* 136*   CREATININE 2.1* 2.1* 1.8* 1.8*   LABGLOM 33* 33* 40* 40*             Assessment/Plan:    MARGARITO on CKD Stage 3b.  - Presumed from cardiorenal syndrome.  - Follows with Dr. Zaragoza in the office.  - Worsening labs with intensification of diuretics.  - Patient is now agreeable to do dialysis.   - S/P RIJ TDC placed on 5/21/24 per IR.   - First HD session today x 2H.  - / for outpatient HD arrangements.    Decompensated Diastolic Heart Failure:  - Has severe pulmonary hypertension which is likely related to chronic volume overload   - RV failure with hypotension.    - Cardiology following.  - Continue IV Lasix for now then UF with HD as tolerated.    Anemia:    - Stable, above goal.  - No need for RAMÍREZ at this time.     Hypervolemic Hyponatremia:  - Continue diuretics.  - Fluid removal with HD.    DM.  - Per primary service.    Please do not hesitate to contact me at [phone] if with questions. Thank you!    Ilsa Gleason MD  [facility]  [facility] [employer]  5/22/2024

## 2024-05-22 NOTE — SIGNIFICANT EVENT
Was notified by Julia of patient being increasingly agitated and fidgeting around in bed. Went to check on patient to find that he had lots of blood covering the lower part of his gown and blood surrounding the garcia site. Called staff assist which became rapid response. During rapid, CBC and BMP drawn and call made to on-call urology MD. 250 mL bolus given as well. Hospitalist at bedside was suspecting that patient may need a cystoscopy or CBI given the amount of blood that was coming out from urethra. Spoke with Dr. Matos/on-call urologist to discuss what to do about the bleeding. Urology MD recommended to hold pressure at site to control bleeding and suggested to place patient in restraints so he would not pull at any lines. No other urologic intervention recommended. Updated patient's family members who were asking about update prior to the rapid.    Knabb NP aware of conversation and restraints ordered. Patient educated on the reason why restraints were being used. No evidence of learning demonstrated.

## 2024-05-23 LAB
GLUCOSE BLD-MCNC: 160 MG/DL (ref 70–99)
GLUCOSE BLD-MCNC: 171 MG/DL (ref 70–99)
GLUCOSE BLD-MCNC: 189 MG/DL (ref 70–99)
HBV CORE AB SERPL QL IA: POSITIVE
NT-PROBNP SERPL-MCNC: 5016 PG/ML (ref 0–124)
PERFORMED ON: ABNORMAL

## 2024-05-23 PROCEDURE — 6360000002 HC RX W HCPCS: Performed by: NURSE PRACTITIONER

## 2024-05-23 PROCEDURE — 2580000003 HC RX 258: Performed by: INTERNAL MEDICINE

## 2024-05-23 PROCEDURE — 6360000002 HC RX W HCPCS: Performed by: STUDENT IN AN ORGANIZED HEALTH CARE EDUCATION/TRAINING PROGRAM

## 2024-05-23 PROCEDURE — 2700000000 HC OXYGEN THERAPY PER DAY

## 2024-05-23 PROCEDURE — 6370000000 HC RX 637 (ALT 250 FOR IP): Performed by: INTERNAL MEDICINE

## 2024-05-23 PROCEDURE — 6370000000 HC RX 637 (ALT 250 FOR IP): Performed by: NURSE PRACTITIONER

## 2024-05-23 PROCEDURE — P9047 ALBUMIN (HUMAN), 25%, 50ML: HCPCS

## 2024-05-23 PROCEDURE — 6360000002 HC RX W HCPCS

## 2024-05-23 PROCEDURE — 83880 ASSAY OF NATRIURETIC PEPTIDE: CPT

## 2024-05-23 PROCEDURE — 2060000000 HC ICU INTERMEDIATE R&B

## 2024-05-23 PROCEDURE — 90935 HEMODIALYSIS ONE EVALUATION: CPT

## 2024-05-23 PROCEDURE — 97535 SELF CARE MNGMENT TRAINING: CPT

## 2024-05-23 PROCEDURE — 94761 N-INVAS EAR/PLS OXIMETRY MLT: CPT

## 2024-05-23 PROCEDURE — 97530 THERAPEUTIC ACTIVITIES: CPT

## 2024-05-23 RX ORDER — OXYCODONE HYDROCHLORIDE AND ACETAMINOPHEN 5; 325 MG/1; MG/1
1 TABLET ORAL EVERY 6 HOURS PRN
Status: COMPLETED | OUTPATIENT
Start: 2024-05-23 | End: 2024-05-24

## 2024-05-23 RX ORDER — ALBUMIN (HUMAN) 12.5 G/50ML
25 SOLUTION INTRAVENOUS ONCE
Status: COMPLETED | OUTPATIENT
Start: 2024-05-23 | End: 2024-05-23

## 2024-05-23 RX ORDER — TORSEMIDE 100 MG/1
100 TABLET ORAL DAILY
Status: DISCONTINUED | OUTPATIENT
Start: 2024-05-24 | End: 2024-05-25 | Stop reason: HOSPADM

## 2024-05-23 RX ORDER — ALBUMIN (HUMAN) 12.5 G/50ML
SOLUTION INTRAVENOUS
Status: COMPLETED
Start: 2024-05-23 | End: 2024-05-23

## 2024-05-23 RX ORDER — ALLOPURINOL 100 MG/1
100 TABLET ORAL DAILY
Status: DISCONTINUED | OUTPATIENT
Start: 2024-05-24 | End: 2024-05-25 | Stop reason: HOSPADM

## 2024-05-23 RX ORDER — MIDODRINE HYDROCHLORIDE 5 MG/1
TABLET ORAL
Status: DISCONTINUED
Start: 2024-05-23 | End: 2024-05-23 | Stop reason: WASHOUT

## 2024-05-23 RX ADMIN — PANTOPRAZOLE SODIUM 40 MG: 40 TABLET, DELAYED RELEASE ORAL at 13:45

## 2024-05-23 RX ADMIN — TIMOLOL MALEATE 1 DROP: 5 SOLUTION OPHTHALMIC at 20:36

## 2024-05-23 RX ADMIN — CARVEDILOL 3.12 MG: 3.12 TABLET, FILM COATED ORAL at 13:46

## 2024-05-23 RX ADMIN — DORZOLAMIDE HYDROCHLORIDE 1 DROP: 20 SOLUTION/ DROPS OPHTHALMIC at 20:36

## 2024-05-23 RX ADMIN — DOCUSATE SODIUM 100 MG: 100 CAPSULE, LIQUID FILLED ORAL at 13:47

## 2024-05-23 RX ADMIN — BRIMONIDINE TARTRATE 1 DROP: 2 SOLUTION OPHTHALMIC at 20:35

## 2024-05-23 RX ADMIN — HYDROMORPHONE HYDROCHLORIDE 0.5 MG: 1 INJECTION, SOLUTION INTRAMUSCULAR; INTRAVENOUS; SUBCUTANEOUS at 08:22

## 2024-05-23 RX ADMIN — HYDROMORPHONE HYDROCHLORIDE 0.5 MG: 1 INJECTION, SOLUTION INTRAMUSCULAR; INTRAVENOUS; SUBCUTANEOUS at 14:11

## 2024-05-23 RX ADMIN — PREGABALIN 50 MG: 25 CAPSULE ORAL at 13:45

## 2024-05-23 RX ADMIN — HYDROMORPHONE HYDROCHLORIDE 0.5 MG: 1 INJECTION, SOLUTION INTRAMUSCULAR; INTRAVENOUS; SUBCUTANEOUS at 16:50

## 2024-05-23 RX ADMIN — MORPHINE SULFATE 2 MG: 2 INJECTION, SOLUTION INTRAMUSCULAR; INTRAVENOUS at 00:14

## 2024-05-23 RX ADMIN — MIDODRINE HYDROCHLORIDE 10 MG: 5 TABLET ORAL at 16:49

## 2024-05-23 RX ADMIN — DORZOLAMIDE HYDROCHLORIDE 1 DROP: 20 SOLUTION/ DROPS OPHTHALMIC at 13:47

## 2024-05-23 RX ADMIN — DOCUSATE SODIUM 100 MG: 100 CAPSULE, LIQUID FILLED ORAL at 20:31

## 2024-05-23 RX ADMIN — ATORVASTATIN CALCIUM 80 MG: 80 TABLET, FILM COATED ORAL at 20:31

## 2024-05-23 RX ADMIN — SODIUM CHLORIDE, PRESERVATIVE FREE 10 ML: 5 INJECTION INTRAVENOUS at 20:36

## 2024-05-23 RX ADMIN — ALBUMIN (HUMAN) 25 G: 0.25 INJECTION, SOLUTION INTRAVENOUS at 09:30

## 2024-05-23 RX ADMIN — OXYCODONE AND ACETAMINOPHEN 1 TABLET: 5; 325 TABLET ORAL at 23:30

## 2024-05-23 RX ADMIN — CARBOXYMETHYLCELLULOSE SODIUM 1 DROP: 10 GEL OPHTHALMIC at 20:31

## 2024-05-23 RX ADMIN — SENNOSIDES 8.6 MG: 8.6 TABLET, FILM COATED ORAL at 13:46

## 2024-05-23 RX ADMIN — SENNOSIDES 8.6 MG: 8.6 TABLET, FILM COATED ORAL at 21:16

## 2024-05-23 RX ADMIN — TAMSULOSIN HYDROCHLORIDE 0.8 MG: 0.4 CAPSULE ORAL at 06:34

## 2024-05-23 RX ADMIN — TIMOLOL MALEATE 1 DROP: 5 SOLUTION OPHTHALMIC at 13:47

## 2024-05-23 RX ADMIN — CARBOXYMETHYLCELLULOSE SODIUM 1 DROP: 10 GEL OPHTHALMIC at 13:45

## 2024-05-23 RX ADMIN — ALBUMIN (HUMAN) 25 G: 12.5 SOLUTION INTRAVENOUS at 09:30

## 2024-05-23 RX ADMIN — BRIMONIDINE TARTRATE 1 DROP: 2 SOLUTION OPHTHALMIC at 13:46

## 2024-05-23 RX ADMIN — INSULIN GLARGINE 50 UNITS: 100 INJECTION, SOLUTION SUBCUTANEOUS at 20:31

## 2024-05-23 ASSESSMENT — PAIN SCALES - GENERAL
PAINLEVEL_OUTOF10: 10
PAINLEVEL_OUTOF10: 9
PAINLEVEL_OUTOF10: 10
PAINLEVEL_OUTOF10: 8
PAINLEVEL_OUTOF10: 9
PAINLEVEL_OUTOF10: 9
PAINLEVEL_OUTOF10: 0
PAINLEVEL_OUTOF10: 9
PAINLEVEL_OUTOF10: 7
PAINLEVEL_OUTOF10: 8

## 2024-05-23 ASSESSMENT — PAIN SCALES - WONG BAKER
WONGBAKER_NUMERICALRESPONSE: HURTS A LITTLE BIT
WONGBAKER_NUMERICALRESPONSE: NO HURT

## 2024-05-23 NOTE — CARE COORDINATION
MD states that the Pt is confused about his dc plan. This RN CM met with the Pt at the bedside about discharge plan. Pt awake and alert at this time with some intermittent confusion. Bedside nurse, Frederic, performing garcia care. After update, the Pt states that \"Frederic will take care of it.\" Pt still with some confusion. Will follow up with Pt tomorrow as he continues to improve and confusion subsides.     Melissa with [facility] contacted and aware of new OP HD set up. Will follow. Plan is dialysis on Saturday and then likely dc back to facility for new OP HD start on 5/28 0625 am.

## 2024-05-23 NOTE — PROGRESS NOTES
Occupational Therapy  Facility/Department: [facility] C4 PCU  Daily Treatment Note  NAME: Shay Townsend  : 1955  MRN: [id number]    Date of Service: 2024    Discharge Recommendations:  Subacute/Skilled Nursing Facility         Patient Diagnosis(es): The primary encounter diagnosis was Hypervolemia, unspecified hypervolemia type. Diagnoses of Troponin level elevated and Elevated brain natriuretic peptide (BNP) level were also pertinent to this visit.     Assessment    Assessment: Pt tolerated session well overall, pleasant throughout. Pt demos need for increased assistance to complete bed mobility, once at EOB requiring grossly mod A for posterior and L lateral lean, however did briefly maintain CGA a few times. Pt tolerated ~ 20 mins total EOB with focus on sitting balance, positioning and reaching activity. Once returned to bed focus on self feeding and noted increased difficulty, decreased coordination and perception with use of LUE, also pt reports severe pain in LUE. Co-tx collaboration this date with PT staff to safely progress pt toward goals. Pt will have better occupational performance outcomes within a co-treatment than 1:1 session. Pt functioning below his baseline, continue to recommend SNF at d/c.   Activity Tolerance: Patient tolerated treatment well;Patient limited by fatigue;Patient limited by pain  Discharge Recommendations: Subacute/Skilled Nursing Facility      Plan   Occupational Therapy Plan  Times Per Week: 2-4x/week     Restrictions  Restrictions/Precautions  Restrictions/Precautions: General Precautions;Fall Risk;Up as Tolerated  Position Activity Restriction  Other position/activity restrictions: O2 nasal cannula, tele, catheter, low vision    Subjective   Subjective  Subjective: Pt in bed at approach, agreeable to OT treatment.    Orientation  Orientation Level: Oriented to person;Oriented to place    Pain: Pt reports 9/10 in L arm, R leg, garcia site.    Cognition  Overall Cognitive  functional/toilet transfer SBA 5/23/24-- goal not met pt unsafe to trial transfers due to poor sitting balance 5/23/24  Short Term Goal 2: Pt will complete 1-2 grooming tasks at sink with CGA  Short Term Goal 3: Pt will verbalize understanding of CHF ed-- defer due to cognitive deficits 5/23/24  Short Term Goal 4: Pt will complete x15 BUE ex  Patient Goals   Patient goals : \"to go home\"    AM-PAC - ADL  AM-PAC Daily Activity - Inpatient   How much help is needed for putting on and taking off regular lower body clothing?: Total  How much help is needed for bathing (which includes washing, rinsing, drying)?: A Lot  How much help is needed for toileting (which includes using toilet, bedpan, or urinal)?: Total  How much help is needed for putting on and taking off regular upper body clothing?: A Lot  How much help is needed for taking care of personal grooming?: A Lot  How much help for eating meals?: A Little  AM-PAC Inpatient Daily Activity Raw Score: 11  AM-PAC Inpatient ADL T-Scale Score : 29.04  ADL Inpatient CMS 0-100% Score: 70.42  ADL Inpatient CMS G-Code Modifier : CL    Therapy Time   Individual Concurrent Group Co-treatment   Time In       1402   Time Out       1444   Minutes       42           BIJAN Bill/MADISON

## 2024-05-23 NOTE — CARE COORDINATION
Madhuri from [facility]  called and has accepted patient at [facility] with start date of 5/28 on a TTS schedule and needs to arrive at 6:25 AM for paperwork that morning. Madhuri can be reached at [phone] ext [phone].

## 2024-05-23 NOTE — PROGRESS NOTES
Physical Therapy  Facility/Department: [facility] C4 PCU  Daily Treatment Note  NAME: Shay Townsend  : 1955  MRN: [id number]    Date of Service: 2024    Discharge Recommendations:  Subacute/Skilled Nursing Facility   PT Equipment Recommendations  Equipment Needed: No  Other: defer to next level of care.    Therapy discharge recommendations take into account each patient's current medical complexities and are subject to input/oversight from the patient's healthcare team.   Barriers to Home Discharge:   [] Steps to access home entry or bed/bath:   [x] Unable to transfer, ambulate, or propel wheelchair household distances without assist   [x] Limited available assist at home upon discharge    [] Patient or family requests d/c to post-acute facility    [x] Poor cognition/safety awareness for d/c to home alone    []Unable to maintain ordered weight bearing status    [] Patient with salient signs of long-standing immobility   [x] Patient is at risk for falls    [] Other:    If pt is unable to be seen after this session, please let this note serve as discharge summary.  Please see case management note for discharge disposition.  Thank you.    Patient Diagnosis(es): The primary encounter diagnosis was Hypervolemia, unspecified hypervolemia type. Diagnoses of Troponin level elevated and Elevated brain natriuretic peptide (BNP) level were also pertinent to this visit.    Assessment   Assessment: Pt presents to [facility] for heart failure. Pt presents below baseline for PT treatment with decreased strength, decreased endurance, and decreased mobility. Pt presented with posterior lean EOB. Pt required constant cues for correction to midline. Unsafe to attempt stand this date. Pt would continue to benefit from skilled PT to aid in the above deficits and a safe DC SNF when medically appropriate.    Pt seen as co-treat with OT due to complexity and level of assist in order to promote safe environment with transfers and increase  overall effectiveness of treatment.  Activity Tolerance: Patient tolerated treatment well;Patient limited by fatigue;Patient limited by pain  Equipment Needed: No  Other: defer to next level of care.       Plan    Physical Therapy Plan  General Plan: 2-3 times per week  Current Treatment Recommendations: Strengthening;Balance training;Functional mobility training;Transfer training;Endurance training;Gait training;Neuromuscular re-education;Home exercise program;Safety education & training;Patient/Caregiver education & training;Equipment evaluation, education, & procurement;Therapeutic activities;Co-Treatment     Restrictions  Restrictions/Precautions  Restrictions/Precautions: General Precautions, Fall Risk, Up as Tolerated  Required Braces or Orthoses?: No  Position Activity Restriction  Other position/activity restrictions: O2 nasal cannula, tele, catheter, low vision     Subjective    Subjective  Subjective: pt in bed, RN cleared  Pain: Pt reports 9/10 in L arm, R leg, garcia site.  Orientation  Overall Orientation Status: Impaired  Orientation Level: Oriented to person;Oriented to place  Cognition  Overall Cognitive Status: Exceptions  Arousal/Alertness: Delayed responses to stimuli  Following Commands: Follows one step commands with increased time;Follows one step commands with repetition  Attention Span: Attends with cues to redirect  Memory: Appears intact  Safety Judgement: Decreased awareness of need for assistance  Problem Solving: Impaired  Insights: Decreased awareness of deficits  Initiation: Requires cues for some  Sequencing: Requires cues for some     Objective        Bed Mobility Training  Bed Mobility Training: Yes  Interventions: Manual cues;Weight shifting training/pressure relief;Safety awareness training;Tactile cues;Verbal cues  Supine to Sit: Maximum assistance;Assist X2 (to R with HOB elevated)  Sit to Supine: Total assistance;Assist X2  Scooting: Maximum assistance  Balance  Sitting:

## 2024-05-23 NOTE — PROGRESS NOTES
Patient's EF (Ejection Fraction) is greater than 40%    Heart Failure Medications:  Diuretics:: Furosemide    (One of the following REQUIRED for EF </= 40%/SYSTOLIC FAILURE but MAY be used in EF% >40%/DIASTOLIC FAILURE)        ACE:: None        ARB:: None         ARNI:: None    (Beta Blockers)  NON- Evidenced Based Beta Blocker (for EF% >40%/DIASTOLIC FAILURE): None    Evidenced Based Beta Blocker::(REQUIRED for EF% <40%/SYSTOLIC FAILURE) None  ...................................................................................................................................................  Patient's weights and intake/output reviewed: Yes    Patient's Last Weight: 114.9 kg obtained by bed scale. Difference of 0.1 lbs less than last documented weight.      Intake/Output Summary (Last 24 hours) at 5/23/2024 1016  Last data filed at 5/23/2024 0809  Gross per 24 hour   Intake 990 ml   Output 800 ml   Net 190 ml       Comorbidities Reviewed Yes    Patient has a past medical history of Anemia, Angina, Arthritis, Atrial fibrillation ([facility]), CAD (coronary artery disease), CHF (congestive heart failure) ([facility]), CKD (chronic kidney disease) stage 3, GFR 30-59 ml/min ([facility]), Clostridium difficile diarrhea, Diabetes mellitus ([facility]), Diabetic neuropathy ([facility]), Disease of blood and blood forming organ, Dizziness, GERD (gastroesophageal reflux disease), Headache, Hearing loss, Hyperlipidemia, Hypertension, Kidney stone, Refusal of blood transfusions as patient is Yarsanism, Sleep apnea, Tinnitus, Venous ulcer (HCC), and Wound, open.     >>For CHF and Comorbidity documentation on Education Time and Topics, please see Education Tab    Progressive Mobility Assessment:  What is this patient's Current Level of Mobility?: Requires Bed Rest  How was this patient Mobilized today?: Unable to Mobilize, ambulated 0 ft                 With Whom? Nurse, PCA, PT, OT, and Self                 Level of Difficulty/Assistance: 2x Assist      Pt resting in bed at this time on  4 L O2. Pt denies shortness of breath. Pt with pitting lower extremity edema.     Patient and/or Family's stated Goal of Care this Admission: reduce shortness of breath, increase activity tolerance, better understand heart failure and disease management, be more comfortable, and reduce lower extremity edema prior to discharge        :

## 2024-05-23 NOTE — PROGRESS NOTES
Nephrology Progress Note   [facility]      This patient is a 69 year old male whom we are following for MARGARITO on CKD and fluid overload.      Subjective:  The patient was seen and examined on dialysis. BP is low, received pain meds this morning. Tolerated 1st HD session yesterday.    Family History: No visitors at bedside  ROS: No nausea or vomiting      Vitals:  /62   Pulse 61   Temp 98 °F (36.7 °C) (Oral)   Resp 24   Ht 1.727 m (5' 8\")   Wt 114.9 kg (253 lb 4.9 oz)   SpO2 96%   BMI 38.52 kg/m²   I/O last 3 completed shifts:  In: 1100 [P.O.:740; Other:360]  Out: 2725 [Urine:2725]  I/O this shift:  In: 50 [Other:50]  Out: -     Physical Exam  Vitals reviewed.   Constitutional:       General: He is not in acute distress.  HENT:      Head: Normocephalic and atraumatic.      Mouth/Throat:      Mouth: Mucous membranes are moist.   Eyes:      General: No scleral icterus.     Conjunctiva/sclera: Conjunctivae normal.   Cardiovascular:      Rate and Rhythm: Normal rate.   Pulmonary:      Effort: Pulmonary effort is normal. No respiratory distress.      Breath sounds: Rales present. No wheezing.   Abdominal:      General: Bowel sounds are normal. There is no distension.      Tenderness: There is no abdominal tenderness.   Musculoskeletal:      Right lower leg: Edema present.      Left lower leg: Edema present.           Medications:   albumin human 25%  25 g IntraVENous Once    midodrine        albumin human 25%        potassium chloride  40 mEq Oral Once    ceFAZolin  2,000 mg IntraVENous Once    [Held by provider] heparin (porcine)  5,000 Units SubCUTAneous 3 times per day    insulin glargine  50 Units SubCUTAneous Nightly    insulin lispro  0-8 Units SubCUTAneous TID     insulin lispro  0-4 Units SubCUTAneous Nightly    pregabalin  50 mg Oral Daily    timolol  1 drop Left Eye BID    brimonidine  1 drop Left Eye BID    dorzolamide  1 drop Left Eye BID    Netarsudil Dimesylate  1 drop Left Eye QPM

## 2024-05-23 NOTE — PROGRESS NOTES
Treatment time: 3 hours  Net UF: 500 ml     Pre weight: 114.9 kg  Post weight:114.4 kg  EDW: tbd kg    Access used: cvc    Access function: good with  ml/min     Medications or blood products given: Albumin 25g     Regular outpatient schedule: tbd     Summary of response to treatment: Patient unable to tolerate UF, hypotension throughout tx.  Copy of dialysis treatment record placed in chart, to be scanned into EMR.

## 2024-05-23 NOTE — PROGRESS NOTES
Pt Name: Shay Townsend  Medical Record Number: [id number]  Date of Birth 1955   Today's Date: 5/23/2024      Subjective:  Seen on HD, complains of some discomfort in his penis       Vitals:  Vitals:    05/22/24 2245 05/22/24 2300 05/23/24 0427 05/23/24 0800   BP: (!) 104/56 114/66 (!) 108/56 116/62   Pulse:   61    Resp: 24      Temp: 98.5 °F (36.9 °C)  98.7 °F (37.1 °C) 98 °F (36.7 °C)   TempSrc: Oral  Oral Oral   SpO2: 95%  96% 96%   Weight:   114.9 kg (253 lb 4.9 oz)    Height:         I/O last 3 completed shifts:  In: 1100 [P.O.:740; Other:360]  Out: 2725 [Urine:2725]    Exam:  General: Awake, oriented, no acute distress  Respiratory: Nonlabored breathing  Abdomen: Soft, non-tender, non-distended, no masses  : garcia catheter intact with yellow output, blood clots and old blood noted in diaper, but no active oozing at the meatus   Skin: Skin color, texture, turgor normal, no rashes or lesions  Neurologic: no gross deficits    CURRENT MEDICATIONS   Scheduled Meds:   albumin human 25%  25 g IntraVENous Once    midodrine        albumin human 25%        [START ON 5/24/2024] torsemide  100 mg Oral Daily    [START ON 5/24/2024] allopurinol  100 mg Oral Daily    potassium chloride  40 mEq Oral Once    ceFAZolin  2,000 mg IntraVENous Once    [Held by provider] heparin (porcine)  5,000 Units SubCUTAneous 3 times per day    insulin glargine  50 Units SubCUTAneous Nightly    insulin lispro  0-8 Units SubCUTAneous TID WC    insulin lispro  0-4 Units SubCUTAneous Nightly    pregabalin  50 mg Oral Daily    timolol  1 drop Left Eye BID    brimonidine  1 drop Left Eye BID    dorzolamide  1 drop Left Eye BID    Netarsudil Dimesylate  1 drop Left Eye QPM    linaclotide  290 mcg Oral QAM AC    midodrine  10 mg Oral TID WC    [Held by provider] aspirin  81 mg Oral Daily    atorvastatin  80 mg Oral Nightly    carboxymethylcellulose PF  1 drop Both Eyes TID    carvedilol  3.125 mg Oral BID WC    docusate sodium  100 mg

## 2024-05-23 NOTE — PLAN OF CARE
Problem: Discharge Planning  Goal: Discharge to home or other facility with appropriate resources  Outcome: Progressing     Problem: Pain  Goal: Verbalizes/displays adequate comfort level or baseline comfort level  Outcome: Progressing     Problem: Skin/Tissue Integrity  Goal: Absence of new skin breakdown  Description: 1.  Monitor for areas of redness and/or skin breakdown  2.  Assess vascular access sites hourly  3.  Every 4-6 hours minimum:  Change oxygen saturation probe site  4.  Every 4-6 hours:  If on nasal continuous positive airway pressure, respiratory therapy assess nares and determine need for appliance change or resting period.  Outcome: Progressing     Problem: Safety - Adult  Goal: Free from fall injury  Outcome: Progressing     Problem: ABCDS Injury Assessment  Goal: Absence of physical injury  Outcome: Progressing     Problem: Chronic Conditions and Co-morbidities  Goal: Patient's chronic conditions and co-morbidity symptoms are monitored and maintained or improved  Outcome: Progressing     Problem: Safety - Medical Restraint  Goal: Remains free of injury from restraints (Restraint for Interference with Medical Device)  Description: INTERVENTIONS:  1. Determine that other, less restrictive measures have been tried or would not be effective before applying the restraint  2. Evaluate the patient's condition at the time of restraint application  3. Inform patient/family regarding the reason for restraint  4. Q2H: Monitor safety, psychosocial status, comfort, nutrition and hydration  Outcome: Progressing    CHF Care Plan      Patient's EF (Ejection Fraction) is greater than 40%    Heart Failure Medications:  Diuretics:: Furosemide    (One of the following REQUIRED for EF </= 40%/SYSTOLIC FAILURE but MAY be used in EF% >40%/DIASTOLIC FAILURE)        ACE:: None        ARB:: None         ARNI:: None    (Beta Blockers)  NON- Evidenced Based Beta Blocker (for EF% >40%/DIASTOLIC FAILURE): None    Evidenced  Based Beta Blocker::(REQUIRED for EF% <40%/SYSTOLIC FAILURE) Carvedilol- Coreg  ...................................................................................................................................................    Failed to redirect to the Timeline version of the [employer] Smart[employer].      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      Intake/Output Summary (Last 24 hours) at 5/22/2024 2127  Last data filed at 5/22/2024 1750  Gross per 24 hour   Intake 670 ml   Output 1925 ml   Net -1255 ml       Education Booklet Provided: yes    Comorbidities Reviewed Yes    Patient has a past medical history of Anemia, Angina, Arthritis, Atrial fibrillation (HCC), CAD (coronary artery disease), CHF (congestive heart failure) ([facility]), CKD (chronic kidney disease) stage 3, GFR 30-59 ml/min (HCC), Clostridium difficile diarrhea, Diabetes mellitus (HCC), Diabetic neuropathy (HCC), Disease of blood and blood forming organ, Dizziness, GERD (gastroesophageal reflux disease), Headache, Hearing loss, Hyperlipidemia, Hypertension, Kidney stone, 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     >>For CHF and Comorbidity documentation on Education Time and Topics, please see Education Tab      Pt resting in bed at this time on  4 L O2. Pt with complaints of shortness of breath. Pt with pitting lower extremity edema.     Patient and/or Family's stated Goal of Care this Admission: reduce shortness of breath, increase activity tolerance, better understand heart failure and disease management, be more comfortable, and reduce lower extremity edema prior to discharge        :

## 2024-05-23 NOTE — PLAN OF CARE
CHF Care Plan      Patient's EF (Ejection Fraction) is greater than 40%    Heart Failure Medications:  Diuretics:: None    (One of the following REQUIRED for EF </= 40%/SYSTOLIC FAILURE but MAY be used in EF% >40%/DIASTOLIC FAILURE)        ACE:: None        ARB:: None         ARNI:: None    (Beta Blockers)  NON- Evidenced Based Beta Blocker (for EF% >40%/DIASTOLIC FAILURE): None    Evidenced Based Beta Blocker::(REQUIRED for EF% <40%/SYSTOLIC FAILURE) Carvedilol- Coreg  ...................................................................................................................................................    Failed to redirect to the Timeline version of the [employer] SmartLink.      Patient's weights and intake/output reviewed    Daily Weight log at bedside, patient/family participation in use of log: pt unable to participate\" (0)    Patient's current weight today shows a difference of 0 lbs less than last documented weight.      Intake/Output Summary (Last 24 hours) at 5/23/2024 0549  Last data filed at 5/23/2024 0427  Gross per 24 hour   Intake 940 ml   Output 1425 ml   Net -485 ml       Education Booklet Provided: yes    Comorbidities Reviewed Yes    Patient has a past medical history of Anemia, Angina, Arthritis, Atrial fibrillation ([facility]), CAD (coronary artery disease), CHF (congestive heart failure) ([facility]), CKD (chronic kidney disease) stage 3, GFR 30-59 ml/min ([facility]), Clostridium difficile diarrhea, Diabetes mellitus ([facility]), Diabetic neuropathy ([facility]), Disease of blood and blood forming organ, Dizziness, GERD (gastroesophageal reflux disease), Headache, Hearing loss, Hyperlipidemia, Hypertension, Kidney stone, Refusal of blood transfusions as patient is Episcopal, Sleep apnea, Tinnitus, Venous ulcer ([facility]), and Wound, open.     >>For CHF and Comorbidity documentation on Education Time and Topics, please see Education Tab      Pt resting in bed at this time on  4 L O2. Pt denies shortness of breath. Pt

## 2024-05-23 NOTE — PROGRESS NOTES
Comprehensive Nutrition Assessment    Type and Reason for Visit:  Initial, RD Nutrition Re-Screen/LOS    Nutrition Recommendations/Plan:   Continue [place] diet and encourage PO intake   FR per MD   Monitor nutrition adequacy, pertinent labs, bowel habits, wt changes, and clinical progress     Malnutrition Assessment:  Malnutrition Status:  At risk for malnutrition (Comment) (05/23/24 8665)    Context:  Acute Illness     Findings of the 6 clinical characteristics of malnutrition:  Fluid Accumulation:  Moderate to Severe Extremities    Nutrition Assessment:    69 y.o. m w/ PMH of CKD diastolic CHF CAD CABG, DM, HTN and hyperlipidemia admitted w/ fluid overload. S/P RIJ TDC placed on 5/21/24 per IR, first HD session on 5/22/24, second session today. Pt in HD at time of attempted visit. On [place] diet, PO intakes % of meals in EMR. Wt varying in EMR, pt also -5.7 L since admission. S/p CHF diet education on 5/18. Continue to encourage PO intake, will continue to monitor.    Nutrition Related Findings:    + 2 BM on 5/22. -238 x 24 hours. No updated labs today. + 4 BLE weeping edema. Wound Type: Multiple, Venous Stasis, Open Wounds       Current Nutrition Intake & Therapies:    Average Meal Intake: %, 51-75%  Average Supplements Intake: None Ordered  ADULT DIET; Regular; No Added Salt (3-4 gm); 1200 ml    Anthropometric Measures:  Height: 172.7 cm (5' 8\")  Ideal Body Weight (IBW): 154 lbs (70 kg)       Current Body Weight: 114.8 kg (253 lb), 164.3 % IBW. Weight Source: Bed Scale  Current BMI (kg/m2): 38.5        Weight Adjustment For: No Adjustment                 BMI Categories: Obese Class 2 (BMI 35.0 -39.9)      Nutrition Diagnosis:   No nutrition diagnosis at this time related to   as evidenced by      Nutrition Interventions:   Food and/or Nutrient Delivery: Continue Current Diet  Nutrition Education/Counseling: Education completed  Coordination of Nutrition Care: Continue to monitor while inpatient

## 2024-05-23 NOTE — PROGRESS NOTES
Hospital Medicine Progress Note      Date of Admission: 5/17/2024  Hospital Day: 7    Chief Admission Complaint:  Dyspnea     Subjective: Patient is in hospital bed alert and oriented.  No new issues or complaints today.  Patient states that he has been having BMs today.  Denies any pain at this time. Spoke with nursing staff and was informed of no acute issues overnight.    Presenting Admission History:       69-year-old male  with a PMH of chronic kidney disease, diastolic congestive heart failure, CAD, CABG, diabetes mellitus type 2, hypertension, hyperlipidemia.     He does have history of cardiorenal syndrome, he has had multiple hospitalizations for decompensated heart failure despite being on high dose diuretics. EF slightly reduced but severe pulmonary hypertension with dilated right and left atria. Scr is 2.1. Recent admission and lost 20 lbs but gained it right back. Now 263 lbs with discharge weight of 247 lbs.     He was admitted to [facility] from 5/2 to 5/10/2024 for acute on chronic diastolic heart failure with associated hypoxic respiratory failure.  He was discharged to the F and over the past week he gained significant weight..  He was having increased lower extremity swelling, abdominal distention or some shortness of breath.  He was sent from the F back to the ED for further evaluation.  Chest x-ray did reveal vascular congestion, and he was admitted for further management and evaluation.    Assessment/Plan:      Current Principal Problem:  Heart failure (HCC)    Acute Respiratory Failure - w/ hypoxia, POArrival.  Presence of clinical respiratory distress w/ tachypnea/dyspnea/SOB and wheezing w/ use of accessory muscles to breath.  Supplemental O2 and wean as tolerated.   -Improved    CHF - acute on chronic diastolic failure w/ preserved EF 50-55% by Echo 5/3/24.  Likely due to hypertensive heart disease.  Continue diuresis as currently ordered w/ close monitoring of Renal function and

## 2024-05-24 LAB
ALBUMIN SERPL-MCNC: 4 G/DL (ref 3.4–5)
ANION GAP SERPL CALCULATED.3IONS-SCNC: 17 MMOL/L (ref 3–16)
BUN SERPL-MCNC: 73 MG/DL (ref 7–20)
CALCIUM SERPL-MCNC: 9.3 MG/DL (ref 8.3–10.6)
CHLORIDE SERPL-SCNC: 99 MMOL/L (ref 99–110)
CO2 SERPL-SCNC: 23 MMOL/L (ref 21–32)
CREAT SERPL-MCNC: 1.5 MG/DL (ref 0.8–1.3)
DEPRECATED RDW RBC AUTO: 19.3 % (ref 12.4–15.4)
GFR SERPLBLD CREATININE-BSD FMLA CKD-EPI: 50 ML/MIN/{1.73_M2}
GLUCOSE BLD-MCNC: 181 MG/DL (ref 70–99)
GLUCOSE BLD-MCNC: 225 MG/DL (ref 70–99)
GLUCOSE BLD-MCNC: 230 MG/DL (ref 70–99)
GLUCOSE BLD-MCNC: 246 MG/DL (ref 70–99)
GLUCOSE SERPL-MCNC: 176 MG/DL (ref 70–99)
HCT VFR BLD AUTO: 30.8 % (ref 40.5–52.5)
HGB BLD-MCNC: 9.8 G/DL (ref 13.5–17.5)
MCH RBC QN AUTO: 27.8 PG (ref 26–34)
MCHC RBC AUTO-ENTMCNC: 32 G/DL (ref 31–36)
MCV RBC AUTO: 86.8 FL (ref 80–100)
PERFORMED ON: ABNORMAL
PHOSPHATE SERPL-MCNC: 3.8 MG/DL (ref 2.5–4.9)
PLATELET # BLD AUTO: 106 K/UL (ref 135–450)
PMV BLD AUTO: 9.6 FL (ref 5–10.5)
POTASSIUM SERPL-SCNC: 4.4 MMOL/L (ref 3.5–5.1)
RBC # BLD AUTO: 3.55 M/UL (ref 4.2–5.9)
SODIUM SERPL-SCNC: 139 MMOL/L (ref 136–145)
WBC # BLD AUTO: 7.6 K/UL (ref 4–11)

## 2024-05-24 PROCEDURE — 97110 THERAPEUTIC EXERCISES: CPT

## 2024-05-24 PROCEDURE — 97530 THERAPEUTIC ACTIVITIES: CPT

## 2024-05-24 PROCEDURE — 2700000000 HC OXYGEN THERAPY PER DAY

## 2024-05-24 PROCEDURE — 6370000000 HC RX 637 (ALT 250 FOR IP): Performed by: INTERNAL MEDICINE

## 2024-05-24 PROCEDURE — 2580000003 HC RX 258: Performed by: INTERNAL MEDICINE

## 2024-05-24 PROCEDURE — 2060000000 HC ICU INTERMEDIATE R&B

## 2024-05-24 PROCEDURE — 97535 SELF CARE MNGMENT TRAINING: CPT

## 2024-05-24 PROCEDURE — 6370000000 HC RX 637 (ALT 250 FOR IP): Performed by: NURSE PRACTITIONER

## 2024-05-24 PROCEDURE — 94761 N-INVAS EAR/PLS OXIMETRY MLT: CPT

## 2024-05-24 PROCEDURE — 85027 COMPLETE CBC AUTOMATED: CPT

## 2024-05-24 PROCEDURE — 80069 RENAL FUNCTION PANEL: CPT

## 2024-05-24 RX ORDER — OXYCODONE HYDROCHLORIDE AND ACETAMINOPHEN 5; 325 MG/1; MG/1
1 TABLET ORAL EVERY 6 HOURS PRN
Status: COMPLETED | OUTPATIENT
Start: 2024-05-24 | End: 2024-05-25

## 2024-05-24 RX ADMIN — SODIUM CHLORIDE, PRESERVATIVE FREE 10 ML: 5 INJECTION INTRAVENOUS at 20:13

## 2024-05-24 RX ADMIN — ALLOPURINOL 100 MG: 100 TABLET ORAL at 08:09

## 2024-05-24 RX ADMIN — TIMOLOL MALEATE 1 DROP: 5 SOLUTION OPHTHALMIC at 20:12

## 2024-05-24 RX ADMIN — CARBOXYMETHYLCELLULOSE SODIUM 1 DROP: 10 GEL OPHTHALMIC at 13:55

## 2024-05-24 RX ADMIN — INSULIN GLARGINE 50 UNITS: 100 INJECTION, SOLUTION SUBCUTANEOUS at 20:13

## 2024-05-24 RX ADMIN — DORZOLAMIDE HYDROCHLORIDE 1 DROP: 20 SOLUTION/ DROPS OPHTHALMIC at 10:21

## 2024-05-24 RX ADMIN — MIDODRINE HYDROCHLORIDE 10 MG: 5 TABLET ORAL at 18:34

## 2024-05-24 RX ADMIN — DOCUSATE SODIUM 100 MG: 100 CAPSULE, LIQUID FILLED ORAL at 08:09

## 2024-05-24 RX ADMIN — BRIMONIDINE TARTRATE 1 DROP: 2 SOLUTION OPHTHALMIC at 08:10

## 2024-05-24 RX ADMIN — OXYCODONE AND ACETAMINOPHEN 1 TABLET: 5; 325 TABLET ORAL at 19:08

## 2024-05-24 RX ADMIN — TAMSULOSIN HYDROCHLORIDE 0.8 MG: 0.4 CAPSULE ORAL at 05:17

## 2024-05-24 RX ADMIN — TORSEMIDE 100 MG: 100 TABLET ORAL at 08:09

## 2024-05-24 RX ADMIN — INSULIN LISPRO 2 UNITS: 100 INJECTION, SOLUTION INTRAVENOUS; SUBCUTANEOUS at 18:33

## 2024-05-24 RX ADMIN — BRIMONIDINE TARTRATE 1 DROP: 2 SOLUTION OPHTHALMIC at 20:12

## 2024-05-24 RX ADMIN — SODIUM CHLORIDE, PRESERVATIVE FREE 10 ML: 5 INJECTION INTRAVENOUS at 10:24

## 2024-05-24 RX ADMIN — ATORVASTATIN CALCIUM 80 MG: 80 TABLET, FILM COATED ORAL at 20:12

## 2024-05-24 RX ADMIN — PANTOPRAZOLE SODIUM 40 MG: 40 TABLET, DELAYED RELEASE ORAL at 08:09

## 2024-05-24 RX ADMIN — SENNOSIDES 8.6 MG: 8.6 TABLET, FILM COATED ORAL at 20:12

## 2024-05-24 RX ADMIN — INSULIN LISPRO 2 UNITS: 100 INJECTION, SOLUTION INTRAVENOUS; SUBCUTANEOUS at 13:55

## 2024-05-24 RX ADMIN — PREGABALIN 50 MG: 25 CAPSULE ORAL at 08:09

## 2024-05-24 RX ADMIN — TIMOLOL MALEATE 1 DROP: 5 SOLUTION OPHTHALMIC at 10:21

## 2024-05-24 RX ADMIN — CARVEDILOL 3.12 MG: 3.12 TABLET, FILM COATED ORAL at 08:08

## 2024-05-24 RX ADMIN — DOCUSATE SODIUM 100 MG: 100 CAPSULE, LIQUID FILLED ORAL at 20:12

## 2024-05-24 RX ADMIN — MIDODRINE HYDROCHLORIDE 10 MG: 5 TABLET ORAL at 11:35

## 2024-05-24 RX ADMIN — DORZOLAMIDE HYDROCHLORIDE 1 DROP: 20 SOLUTION/ DROPS OPHTHALMIC at 20:12

## 2024-05-24 RX ADMIN — OXYCODONE AND ACETAMINOPHEN 1 TABLET: 5; 325 TABLET ORAL at 11:34

## 2024-05-24 RX ADMIN — POLYETHYLENE GLYCOL 3350 17 G: 17 POWDER, FOR SOLUTION ORAL at 08:08

## 2024-05-24 RX ADMIN — CARBOXYMETHYLCELLULOSE SODIUM 1 DROP: 10 GEL OPHTHALMIC at 20:12

## 2024-05-24 RX ADMIN — CARBOXYMETHYLCELLULOSE SODIUM 1 DROP: 10 GEL OPHTHALMIC at 08:09

## 2024-05-24 RX ADMIN — SENNOSIDES 8.6 MG: 8.6 TABLET, FILM COATED ORAL at 08:09

## 2024-05-24 RX ADMIN — MIDODRINE HYDROCHLORIDE 10 MG: 5 TABLET ORAL at 08:14

## 2024-05-24 RX ADMIN — OXYCODONE AND ACETAMINOPHEN 1 TABLET: 5; 325 TABLET ORAL at 05:55

## 2024-05-24 ASSESSMENT — PAIN DESCRIPTION - LOCATION: LOCATION: BACK;LEG

## 2024-05-24 ASSESSMENT — PAIN SCALES - GENERAL
PAINLEVEL_OUTOF10: 8
PAINLEVEL_OUTOF10: 0
PAINLEVEL_OUTOF10: 8
PAINLEVEL_OUTOF10: 9
PAINLEVEL_OUTOF10: 7

## 2024-05-24 ASSESSMENT — PAIN DESCRIPTION - ORIENTATION: ORIENTATION: RIGHT;LEFT

## 2024-05-24 ASSESSMENT — PAIN DESCRIPTION - DESCRIPTORS
DESCRIPTORS: ACHING
DESCRIPTORS: ACHING

## 2024-05-24 NOTE — PROGRESS NOTES
Occupational Therapy  Facility/Department: [place] [address] [facility]  Daily Treatment Note  NAME: Shay Townsend  : 1955  MRN: [id number]    Date of Service: 2024    Discharge Recommendations:  Subacute/Skilled Nursing Facility         Patient Diagnosis(es): The primary encounter diagnosis was Hypervolemia, unspecified hypervolemia type. Diagnoses of Troponin level elevated and Elevated brain natriuretic peptide (BNP) level were also pertinent to this visit.     Assessment    Assessment: Pt tolerated OT session well this date, demos improved sitting balance CGA-min A this session. Pt requiring grossly min-mod A of 2 with Melissa GOTTLIEB for all transfers. Pt total A for LB ADLs. Co-tx collaboration this date with PT staff to safely progress pt toward goals. Pt will have better occupational performance outcomes within a co-treatment than 1:1 session. Pt functioning below his baseline, continue to recommend SNF at d/c.   Activity Tolerance: Patient tolerated treatment well;Patient limited by pain  Discharge Recommendations: Subacute/Skilled Nursing Facility      Plan   Occupational Therapy Plan  Times Per Week: 2-4x/week     Restrictions  Restrictions/Precautions  Restrictions/Precautions: General Precautions;Fall Risk;Up as Tolerated  Position Activity Restriction  Other position/activity restrictions: O2 nasal cannula, tele, catheter, low vision    Subjective   Subjective  Subjective: Pt in bed at approach, pleasant and agreeable to OT treatment.    Orientation  Overall Orientation Status: Impaired  Orientation Level: Oriented to person;Oriented to place    Pain: Pt reports pain in L arm and leg, R hand; not rated.    Cognition  Overall Cognitive Status: Exceptions  Following Commands: Follows one step commands with increased time  Attention Span: Attends with cues to redirect  Memory: Decreased short term memory  Insights: Decreased awareness of deficits  Initiation: Requires cues for some  Sequencing: Requires cues for

## 2024-05-24 NOTE — DISCHARGE INSTR - COC
Continuity of Care Form    Patient Name: Shay Townsend   :  1955  MRN:  [id number]    Admit date:  2024  Discharge date:  24    Code Status Order: Limited   Advance Directives:     Admitting Physician:  Link Cazares MD  PCP: Julia Stein MD    Discharging Nurse: brook jimenez rn  Discharging Hospital Unit/Room#: 0447/0447-01  Discharging Unit Phone Number: [phone]    Emergency Contact:   Extended Emergency Contact Information  Primary Emergency Contact: Jim Betancur [facility]  Home Phone: [phone]  Mobile Phone: [phone]  Relation: Other   needed? No  Secondary Emergency Contact: Sloan Dey  Mobile Phone: [phone]  Relation: Other    Past Surgical History:  Past Surgical History:   Procedure Laterality Date    CARDIAC SURGERY      Dec 27 2010, triple bypass    CHOLECYSTECTOMY  2011    HERNIA REPAIR  age2    IR TUNNELED CATHETER PLACEMENT GREATER THAN 5 YEARS  2024    IR TUNNELED CATHETER PLACEMENT GREATER THAN 5 YEARS 2024 [facility] PROCEDURES    KNEE ARTHROCENTESIS Right 10/6/2022    RIGHT SHOULDER INTRA ARTICULAR INJECTION SITE CONFIRMED BY FLUOROSCOPY performed by Bjorn Burciaga MD at [facility] [place] [facility]    OTHER SURGICAL HISTORY  11 REPAIR LOWER STERNAL INCISION, REMOVAL OF ONE STERNAL WIRE,    OTHER SURGICAL HISTORY  10/10/2014    phacoemulsification of cataract with intraocular lens implant left eye    PAIN MANAGEMENT PROCEDURE N/A 2/10/2022    MIDLINE CERVICAL SIX SEVEN EPIDURAL STEROID INJECTION SITE CONFIRMED BY FLUOROSCOPY performed by Bjorn Burciaga MD at [facility] [place] [facility]    PAIN MANAGEMENT PROCEDURE N/A 2022    MIDLINE TO RIGHT CERVICAL SIX SEVEN EPIDURAL STEROID INJECTION SITE CONFIRMED BY FLUOROSCOPY performed by Bjorn Burciaga MD at [facility]    UPPER GASTROINTESTINAL ENDOSCOPY         Immunization History:   Immunization History   Administered Date(s) Administered    COVID-19, PFIZER PURPLE top,  05/17/24    Readmission Risk Assessment Score:  Readmission Risk              Risk of Unplanned Readmission:  43           Discharging to Facility/ Agency   Name: [facility]  Phone:[phone]  Fax:[phone]    Dialysis Facility (if applicable)   Name: [facility]   Dialysis Schedule: [facility]- start of service Tuesday May 28th, 0625 arrival time.   Phone:[phone]  Fax:[phone]    / signature: Electronically signed by Madhuri Aleman RN on 5/25/24 at 2:34 PM EDT    PHYSICIAN SECTION    Prognosis: Fair    Condition at Discharge: Stable    Rehab Potential (if transferring to Rehab): Good    Recommended Labs or Other Treatments After Discharge: NA    Physician Certification: I certify the above information and transfer of Shay Townsend  is necessary for the continuing treatment of the diagnosis listed and that he requires Skilled Nursing Facility for less 30 days.     Update Admission H&P: No change in H&P    PHYSICIAN SIGNATURE:  Electronically signed by Danilo Porter DO on 5/25/24 at 1:45 PM EDT

## 2024-05-24 NOTE — CARE COORDINATION
Met with the pt at the bedside to discuss dc plan. Pt up in chair today, alert and oriented. Plan is for dc tomorrow back to [facility] after dialysis session. Pt will start dialysis at [facility] on Tuesday 5/28. Message left for Lisa ([phone]) with [facility] with update.

## 2024-05-24 NOTE — PLAN OF CARE

## 2024-05-24 NOTE — PROGRESS NOTES
Nephrology Progress Note   [email or web address]      This patient is a 69 year old male whom we are following for MARGARITO on CKD and fluid overload.      Subjective:  The patient was seen and examined. Had 2nd HD yesterday with net UF of 500mL. On 3LPM of O2 via nc.    Family History: No visitors at bedside  ROS: No nausea or vomiting      Vitals:  /66   Pulse 83   Temp 97.9 °F (36.6 °C) (Oral)   Resp 17   Ht 1.727 m (5' 8\")   Wt 119.4 kg (263 lb 3.7 oz)   SpO2 98%   BMI 40.02 kg/m²   I/O last 3 completed shifts:  In: 720 [P.O.:270; Other:450]  Out: 650 [Urine:650]  No intake/output data recorded.    Physical Exam  Vitals reviewed.   Constitutional:       General: He is not in acute distress.  HENT:      Head: Normocephalic and atraumatic.      Mouth/Throat:      Mouth: Mucous membranes are moist.   Eyes:      General: No scleral icterus.     Conjunctiva/sclera: Conjunctivae normal.   Cardiovascular:      Rate and Rhythm: Normal rate.   Pulmonary:      Effort: Pulmonary effort is normal. No respiratory distress.      Breath sounds: Rales present. No wheezing.   Abdominal:      General: Bowel sounds are normal. There is no distension.      Tenderness: There is no abdominal tenderness.   Musculoskeletal:      Right lower leg: Edema present.      Left lower leg: Edema present.           Medications:   torsemide  100 mg Oral Daily    allopurinol  100 mg Oral Daily    ceFAZolin  2,000 mg IntraVENous Once    [Held by provider] heparin (porcine)  5,000 Units SubCUTAneous 3 times per day    insulin glargine  50 Units SubCUTAneous Nightly    insulin lispro  0-8 Units SubCUTAneous TID WC    insulin lispro  0-4 Units SubCUTAneous Nightly    pregabalin  50 mg Oral Daily    timolol  1 drop Left Eye BID    brimonidine  1 drop Left Eye BID    dorzolamide  1 drop Left Eye BID    Netarsudil Dimesylate  1 drop Left Eye QPM    linaclotide  290 mcg Oral QAM AC    midodrine  10 mg Oral TID WC    [Held by provider] aspirin  81 mg

## 2024-05-24 NOTE — PLAN OF CARE
CHF Care Plan      Patient's EF (Ejection Fraction) is greater than 40%    Heart Failure Medications:  Diuretics:: Furosemide    (One of the following REQUIRED for EF </= 40%/SYSTOLIC FAILURE but MAY be used in EF% >40%/DIASTOLIC FAILURE)        ACE:: None        ARB:: None         ARNI:: None    (Beta Blockers)  NON- Evidenced Based Beta Blocker (for EF% >40%/DIASTOLIC FAILURE): None    Evidenced Based Beta Blocker::(REQUIRED for EF% <40%/SYSTOLIC FAILURE) None  ...................................................................................................................................................    Failed to redirect to the Timeline version of the [employer] SmartLink.      Patient's weights and intake/output reviewed    Daily Weight log at bedside, patient/family participation in use of log: \"yes    Patient's current weight today shows a difference of 10 lbs more than last documented weight.      Intake/Output Summary (Last 24 hours) at 5/24/2024 0620  Last data filed at 5/24/2024 0415  Gross per 24 hour   Intake 330 ml   Output 100 ml   Net 230 ml       Education Booklet Provided: yes    Comorbidities Reviewed Yes    Patient has a past medical history of Anemia, Angina, Arthritis, Atrial fibrillation (HCC), CAD (coronary artery disease), CHF (congestive heart failure) (HCC), CKD (chronic kidney disease) stage 3, GFR 30-59 ml/min (HCC), Clostridium difficile diarrhea, Diabetes mellitus (HCC), Diabetic neuropathy (HCC), Disease of blood and blood forming organ, Dizziness, GERD (gastroesophageal reflux disease), Headache, Hearing loss, Hyperlipidemia, Hypertension, Kidney stone, Refusal of blood transfusions as patient is Oriental orthodox, Sleep apnea, Tinnitus, Venous ulcer (HCC), and Wound, open.     >>For CHF and Comorbidity documentation on Education Time and Topics, please see Education Tab      Pt resting in bed at this time on  4 L O2. Pt denies shortness of breath. Pt with pitting lower extremity

## 2024-05-24 NOTE — PROGRESS NOTES
Urology Attending Progress Note      Subjective: pt tolerating garcia well    Vitals:  /66   Pulse 83   Temp 97.9 °F (36.6 °C) (Oral)   Resp 17   Ht 1.727 m (5' 8\")   Wt 119.4 kg (263 lb 3.7 oz)   SpO2 98%   BMI 40.02 kg/m²   Temp  Av °F (36.7 °C)  Min: 97.5 °F (36.4 °C)  Max: 98.4 °F (36.9 °C)    Intake/Output Summary (Last 24 hours) at 2024 1152  Last data filed at 2024 0641  Gross per 24 hour   Intake 280 ml   Output 350 ml   Net -70 ml       Exam: urine morelia and small amount of blood around garcia at tip of penis    Labs:  WBC:    Lab Results   Component Value Date/Time    WBC 7.6 2024 05:38 AM     Hemoglobin/Hematocrit:    Lab Results   Component Value Date/Time    HGB 9.8 2024 05:38 AM    HCT 30.8 2024 05:38 AM     BMP:    Lab Results   Component Value Date/Time     2024 05:38 AM    K 4.4 2024 05:38 AM    K 3.4 2024 10:52 PM    CL 99 2024 05:38 AM    CO2 23 2024 05:38 AM    BUN 73 2024 05:38 AM    CREATININE 1.5 2024 05:38 AM    CALCIUM 9.3 2024 05:38 AM    GFRAA 43 2022 08:20 AM    GFRAA 53 2013 02:22 PM    LABGLOM 50 2024 05:38 AM    LABGLOM 33 2023 12:00 AM     PT/INR:    Lab Results   Component Value Date/Time    PROTIME 16.6 2024 07:48 AM    INR 1.32 2024 07:48 AM     PTT:    Lab Results   Component Value Date/Time    APTT 49.4 2023 12:47 PM   [APTT    Impression/Plan: s/p traumatic garcia removal  Urine clearing and no need for garcia flushes  Small amount of blood around the garcia, continue garcia for now and this will improve with time    Harsh Matos MD

## 2024-05-24 NOTE — PROGRESS NOTES
Physical Therapy  Facility/Department: [place] [address] [facility]  Daily Treatment Note  NAME: Shay Townsend  : 1955  MRN: [id number]    Date of Service: 2024    Discharge Recommendations:  Subacute/Skilled Nursing Facility   PT Equipment Recommendations  Equipment Needed: No  Other: defer to next level of care.    Patient Diagnosis(es): The primary encounter diagnosis was Hypervolemia, unspecified hypervolemia type. Diagnoses of Troponin level elevated and Elevated brain natriuretic peptide (BNP) level were also pertinent to this visit.    Assessment   Assessment: Pt demos need for  x 2 during bed mobility and STS transfers.  He utilized the stedy for toileting per pts request for BM.  Pt was seen as co treat to safely mobilize and progress pts activity.  Pt is recommended for con't skilled PT and SNF at D/C.  Activity Tolerance: Patient tolerated treatment well;Patient limited by fatigue;Patient limited by pain  Equipment Needed: No  Other: defer to next level of care.     Plan    Physical Therapy Plan  General Plan: 2-3 times per week  Current Treatment Recommendations: Strengthening;Balance training;Functional mobility training;Transfer training;Endurance training;Gait training;Neuromuscular re-education;Home exercise program;Safety education & training;Patient/Caregiver education & training;Equipment evaluation, education, & procurement;Therapeutic activities;Co-Treatment     Restrictions  Restrictions/Precautions  Restrictions/Precautions: General Precautions, Fall Risk, Up as Tolerated  Required Braces or Orthoses?: No  Position Activity Restriction  Other position/activity restrictions: O2 nasal cannula, tele, catheter, low vision     Subjective    Subjective  Subjective: pt in bed, RN cleared  Pain: Pt reports pain in L arm and leg, R hand.  not rated.     Objective   Vitals  Pulse: 67  BP: (!) 103/59  MAP (Calculated): 74  SpO2: 93 %  Bed Mobility Training  Bed Mobility Training: Yes  Interventions: Manual  turning from your back to your side while in a flat bed without using bedrails?: A Lot  How much help is needed moving from lying on your back to sitting on the side of a flat bed without using bedrails?: A Lot  How much help is needed moving to and from a bed to a chair?: Total  How much help is needed standing up from a chair using your arms?: A Lot  How much help is needed walking in hospital room?: Total  How much help is needed climbing 3-5 steps with a railing?: Total  AM-PAC Inpatient Mobility Raw Score : 9  AM-[facility] Inpatient T-Scale Score : 30.55  Mobility Inpatient CMS 0-100% Score: 81.38  Mobility Inpatient CMS G-Code Modifier : CM         Therapy Time   Individual Concurrent Group Co-treatment   Time In       1052   Time Out       1130   Minutes       38           Diane Mills, PTA#[id number]

## 2024-05-24 NOTE — PROGRESS NOTES
Prophylaxis: [x]PPx LMWH  []SQ Heparin  []IPC/SCDs  []Eliquis  []Xarelto  []Coumadin  []Other -      Code status: Limited  PT/OT Eval Status:   []NOT yet ordered  []Ordered and Pending   [x]Seen with Recommendations for:  []Home independently  []Home w/ assist  []HHC  [x]SNF  []Acute Rehab    Anticipated Discharge Day/Date:  1-3 days    Anticipated Discharge Location: []Home  []HHC  [x]SNF  []Acute Rehab  []ECF  []LTAC  []Hospice  []Other -      Consults:      IP CONSULT TO NEPHROLOGY  IP CONSULT TO HEART FAILURE NURSE/COORDINATOR  IP CONSULT TO DIETITIAN  IP CONSULT TO CARDIOLOGY  IP CONSULT TO PALLIATIVE CARE  IP CONSULT TO UROLOGY      This patient has a high likelihood of being discharged tomorrow and is appropriate for A1 Discharge Unit in AM pending clinical course overnight: []Yes  [x]No    ------------------------------------------------------------------------------------------------------------------------------------------------------------------------    [facility]  (this section is simply meant as an aid to accurate billing and does not necessarily reflect ongoing patient care which is documented elsewhere in the medical record)     [x] High (any 2)     A. Problems (any 1)  [x] Acute/Chronic Illness/injury posing threat to life or bodily function: Acute CHF / Respiratory failure   [] Severe exacerbation of chronic illness:    ---------------------------------------------------------------------  B. Risk of Treatment (any 1)   [x] Drugs/treatments that require intensive monitoring for toxicity include:    [] IV ABX requiring serial renal monitoring for nephrotoxicity:     [] Post-Cath/Contrast study requiring serial renal monitoring for Contrast Induced Nephropathy  [] IV Narcotic analgesia for ADR   [] Aggressive IV diuresis requiring serial monitoring for renal impairment and electrolyte derangements  [] Hypertonic Saline requiring serial renal monitoring for appropriate electrolyte correction rate   []    Lab Results   Component Value Date/Time    ORG Pseudomonas aeruginosa 09/18/2020 03:23 PM    ORG Enterococcus faecalis 09/18/2020 03:23 PM         Danilo Porter, DO

## 2024-05-25 VITALS
OXYGEN SATURATION: 91 % | WEIGHT: 243.61 LBS | SYSTOLIC BLOOD PRESSURE: 108 MMHG | RESPIRATION RATE: 18 BRPM | BODY MASS INDEX: 36.92 KG/M2 | DIASTOLIC BLOOD PRESSURE: 51 MMHG | TEMPERATURE: 98.1 F | HEIGHT: 68 IN | HEART RATE: 65 BPM

## 2024-05-25 PROBLEM — G62.9 NEUROPATHY: Chronic | Status: ACTIVE | Noted: 2024-05-25

## 2024-05-25 LAB
FERRITIN SERPL IA-MCNC: 250.9 NG/ML (ref 30–400)
GLUCOSE BLD-MCNC: 138 MG/DL (ref 70–99)
IRON SATN MFR SERPL: 17 % (ref 20–50)
IRON SERPL-MCNC: 39 UG/DL (ref 59–158)
PERFORMED ON: ABNORMAL
TIBC SERPL-MCNC: 225 UG/DL (ref 260–445)

## 2024-05-25 PROCEDURE — 6360000002 HC RX W HCPCS: Performed by: INTERNAL MEDICINE

## 2024-05-25 PROCEDURE — 6370000000 HC RX 637 (ALT 250 FOR IP): Performed by: INTERNAL MEDICINE

## 2024-05-25 PROCEDURE — 83550 IRON BINDING TEST: CPT

## 2024-05-25 PROCEDURE — 6360000002 HC RX W HCPCS: Performed by: STUDENT IN AN ORGANIZED HEALTH CARE EDUCATION/TRAINING PROGRAM

## 2024-05-25 PROCEDURE — 6370000000 HC RX 637 (ALT 250 FOR IP): Performed by: STUDENT IN AN ORGANIZED HEALTH CARE EDUCATION/TRAINING PROGRAM

## 2024-05-25 PROCEDURE — 82728 ASSAY OF FERRITIN: CPT

## 2024-05-25 PROCEDURE — 94761 N-INVAS EAR/PLS OXIMETRY MLT: CPT

## 2024-05-25 PROCEDURE — 2700000000 HC OXYGEN THERAPY PER DAY

## 2024-05-25 PROCEDURE — 90935 HEMODIALYSIS ONE EVALUATION: CPT

## 2024-05-25 PROCEDURE — 83540 ASSAY OF IRON: CPT

## 2024-05-25 RX ORDER — OXYCODONE HYDROCHLORIDE AND ACETAMINOPHEN 5; 325 MG/1; MG/1
1 TABLET ORAL EVERY 6 HOURS PRN
Qty: 12 TABLET | Refills: 0 | Status: ON HOLD | OUTPATIENT
Start: 2024-05-25 | End: 2024-05-29 | Stop reason: HOSPADM

## 2024-05-25 RX ORDER — OXYCODONE AND ACETAMINOPHEN 10; 325 MG/1; MG/1
1 TABLET ORAL ONCE
Status: COMPLETED | OUTPATIENT
Start: 2024-05-25 | End: 2024-05-25

## 2024-05-25 RX ORDER — PREGABALIN 50 MG/1
50 CAPSULE ORAL DAILY
Qty: 30 CAPSULE | Refills: 0 | Status: SHIPPED | OUTPATIENT
Start: 2024-05-26 | End: 2024-06-25

## 2024-05-25 RX ADMIN — PANTOPRAZOLE SODIUM 40 MG: 40 TABLET, DELAYED RELEASE ORAL at 09:04

## 2024-05-25 RX ADMIN — TORSEMIDE 100 MG: 100 TABLET ORAL at 11:38

## 2024-05-25 RX ADMIN — BRIMONIDINE TARTRATE 1 DROP: 2 SOLUTION OPHTHALMIC at 09:08

## 2024-05-25 RX ADMIN — MIDODRINE HYDROCHLORIDE 10 MG: 5 TABLET ORAL at 18:08

## 2024-05-25 RX ADMIN — EPOETIN ALFA-EPBX 3000 UNITS: 3000 INJECTION, SOLUTION INTRAVENOUS; SUBCUTANEOUS at 16:34

## 2024-05-25 RX ADMIN — HYDROMORPHONE HYDROCHLORIDE 0.5 MG: 1 INJECTION, SOLUTION INTRAMUSCULAR; INTRAVENOUS; SUBCUTANEOUS at 16:07

## 2024-05-25 RX ADMIN — ALLOPURINOL 100 MG: 100 TABLET ORAL at 09:05

## 2024-05-25 RX ADMIN — OXYCODONE AND ACETAMINOPHEN 1 TABLET: 5; 325 TABLET ORAL at 09:05

## 2024-05-25 RX ADMIN — OXYCODONE HYDROCHLORIDE AND ACETAMINOPHEN 1 TABLET: 10; 325 TABLET ORAL at 13:45

## 2024-05-25 RX ADMIN — TAMSULOSIN HYDROCHLORIDE 0.8 MG: 0.4 CAPSULE ORAL at 05:40

## 2024-05-25 RX ADMIN — OXYCODONE AND ACETAMINOPHEN 1 TABLET: 5; 325 TABLET ORAL at 02:16

## 2024-05-25 RX ADMIN — DORZOLAMIDE HYDROCHLORIDE 1 DROP: 20 SOLUTION/ DROPS OPHTHALMIC at 09:12

## 2024-05-25 RX ADMIN — MIDODRINE HYDROCHLORIDE 10 MG: 5 TABLET ORAL at 11:38

## 2024-05-25 RX ADMIN — MIDODRINE HYDROCHLORIDE 10 MG: 5 TABLET ORAL at 09:05

## 2024-05-25 RX ADMIN — PREGABALIN 50 MG: 25 CAPSULE ORAL at 09:05

## 2024-05-25 RX ADMIN — SENNOSIDES 8.6 MG: 8.6 TABLET, FILM COATED ORAL at 11:38

## 2024-05-25 RX ADMIN — DOCUSATE SODIUM 100 MG: 100 CAPSULE, LIQUID FILLED ORAL at 11:38

## 2024-05-25 RX ADMIN — TIMOLOL MALEATE 1 DROP: 5 SOLUTION OPHTHALMIC at 09:18

## 2024-05-25 RX ADMIN — CARBOXYMETHYLCELLULOSE SODIUM 1 DROP: 10 GEL OPHTHALMIC at 11:37

## 2024-05-25 ASSESSMENT — PAIN SCALES - WONG BAKER
WONGBAKER_NUMERICALRESPONSE: NO HURT
WONGBAKER_NUMERICALRESPONSE: NO HURT

## 2024-05-25 ASSESSMENT — PAIN SCALES - GENERAL
PAINLEVEL_OUTOF10: 10
PAINLEVEL_OUTOF10: 2
PAINLEVEL_OUTOF10: 9
PAINLEVEL_OUTOF10: 0
PAINLEVEL_OUTOF10: 8
PAINLEVEL_OUTOF10: 2
PAINLEVEL_OUTOF10: 8

## 2024-05-25 ASSESSMENT — PAIN DESCRIPTION - DESCRIPTORS
DESCRIPTORS: ACHING
DESCRIPTORS: ACHING

## 2024-05-25 ASSESSMENT — PAIN DESCRIPTION - ORIENTATION: ORIENTATION: RIGHT;LEFT

## 2024-05-25 ASSESSMENT — PAIN DESCRIPTION - LOCATION
LOCATION: GENERALIZED
LOCATION: GENERALIZED
LOCATION: FOOT

## 2024-05-25 NOTE — PROGRESS NOTES
Pts vs taken. Assessed. Urinary garcia catheter wnl, urine clear light yellow. No blood noted. Transport here to take pt to hd. Cmu updated.

## 2024-05-25 NOTE — PROGRESS NOTES
Pt is back to his room. Report taken. Pt did have a hard bm in hd on bed pan. After bm, penis started bleeding around urinary garcia catheter. No in the catheter, around the catheter. Hd nurse stated he held pressure for about 45 mins and it finally stopped. Pt is back in his room cmu aware.

## 2024-05-25 NOTE — PLAN OF CARE
Problem: Discharge Planning  Goal: Discharge to home or other facility with appropriate resources  Outcome: Completed     Problem: Pain  Goal: Verbalizes/displays adequate comfort level or baseline comfort level  Outcome: Completed     Problem: Skin/Tissue Integrity  Goal: Absence of new skin breakdown  Description: 1.  Monitor for areas of redness and/or skin breakdown  2.  Assess vascular access sites hourly  3.  Every 4-6 hours minimum:  Change oxygen saturation probe site  4.  Every 4-6 hours:  If on nasal continuous positive airway pressure, respiratory therapy assess nares and determine need for appliance change or resting period.  Outcome: Completed     Problem: Safety - Adult  Goal: Free from fall injury  Outcome: Completed     Problem: ABCDS Injury Assessment  Goal: Absence of physical injury  Outcome: Completed     Problem: Chronic Conditions and Co-morbidities  Goal: Patient's chronic conditions and co-morbidity symptoms are monitored and maintained or improved  Outcome: Completed     Problem: Safety - Medical Restraint  Goal: Remains free of injury from restraints (Restraint for Interference with Medical Device)  Description: INTERVENTIONS:  1. Determine that other, less restrictive measures have been tried or would not be effective before applying the restraint  2. Evaluate the patient's condition at the time of restraint application  3. Inform patient/family regarding the reason for restraint  4. Q2H: Monitor safety, psychosocial status, comfort, nutrition and hydration  Outcome: Completed      [phone]

## 2024-05-25 NOTE — PROGRESS NOTES
Nephrology Progress Note   [email or web address]      Sub/interval history  Seen and examined at HD on 5/25 UF as tolerated    Last 24 h uop 700 ml    ROS: No chest pain/shortness of breath/fever/nausea/vomiting  PSFH: No visitor    Scheduled Meds:   epoetin kashif-epbx  3,000 Units IntraVENous Once per day on Tue Thu Sat    torsemide  100 mg Oral Daily    allopurinol  100 mg Oral Daily    ceFAZolin  2,000 mg IntraVENous Once    [Held by provider] heparin (porcine)  5,000 Units SubCUTAneous 3 times per day    insulin glargine  50 Units SubCUTAneous Nightly    insulin lispro  0-8 Units SubCUTAneous TID WC    insulin lispro  0-4 Units SubCUTAneous Nightly    pregabalin  50 mg Oral Daily    timolol  1 drop Left Eye BID    brimonidine  1 drop Left Eye BID    dorzolamide  1 drop Left Eye BID    Netarsudil Dimesylate  1 drop Left Eye QPM    linaclotide  290 mcg Oral QAM AC    midodrine  10 mg Oral TID WC    [Held by provider] aspirin  81 mg Oral Daily    atorvastatin  80 mg Oral Nightly    carboxymethylcellulose PF  1 drop Both Eyes TID    docusate sodium  100 mg Oral BID    pantoprazole  40 mg Oral Daily    senna  1 tablet Oral BID    tamsulosin  0.8 mg Oral Daily    sodium chloride flush  5-40 mL IntraVENous 2 times per day     Continuous Infusions:   sodium chloride      dextrose       PRN Meds:.HYDROmorphone **OR** HYDROmorphone, potassium chloride **OR** potassium alternative oral replacement **OR** potassium chloride, heparin (porcine), lidocaine, sodium chloride flush, sodium chloride, ondansetron **OR** ondansetron, polyethylene glycol, acetaminophen **OR** acetaminophen, magnesium sulfate, glucose, dextrose bolus **OR** dextrose bolus, glucagon (rDNA), dextrose    Objective/     Vitals:    05/25/24 0530 05/25/24 0745 05/25/24 0810 05/25/24 0935   BP: (!) 97/51 (!) 110/56 (!) 116/58    Pulse: 62  69    Resp: 18  18 18   Temp: 98.9 °F (37.2 °C)  98.1 °F (36.7 °C)    TempSrc: Oral      SpO2: 91%      Weight:   112.3 kg  Hemigard Intro: Due to skin fragility and wound tension, it was decided to use HEMIGARD adhesive retention suture devices to permit a linear closure. The skin was cleaned and dried for a 6cm distance away from the wound. Excessive hair, if present, was removed to allow for adhesion.

## 2024-05-25 NOTE — PROGRESS NOTES
Notice retacrit was not given. Called pharmacy dose received.     Pt was calling out in bad pain for neuropathy, not relieved by percocet. Dilaudid given as requested and ordered.     Pt is more comfortable. He is eating dinner and talking to pca telling his stories. He is a people person. Very pleasant.

## 2024-05-25 NOTE — CARE COORDINATION
CASE MANAGEMENT DISCHARGE SUMMARY      Discharge to: [facility]    Precertification completed: N/A  Hospital Exemption Notification (HENS) completed: N/A     Transportation:    Family/car: no   Medical Transport explained to pt/family. Pt/family voice no agency preference.    Agency used: [facility]   time: 1700   Ambulance form completed: Yes    Confirmed discharge plan with:     Patient: yes per RN     Family:  yes Sloan Dey [phone]       Facility/Agency, name: [facility] faxed   Phone number for report to facility: [phone]     RN, name: Caryn MATTA    Note: Discharging nurse to complete ARASH, reconcile AVS, and place final copy with patient's discharge packet. RN to ensure that written prescriptions for  Level II medications are sent with patient to the facility as per protocol.

## 2024-05-25 NOTE — PLAN OF CARE
CHF Care Plan  20 mins review    Patient's EF (Ejection Fraction) is greater than 40%    Heart Failure Medications:  Diuretics:: Furosemide    (One of the following REQUIRED for EF </= 40%/SYSTOLIC FAILURE but MAY be used in EF% >40%/DIASTOLIC FAILURE)        ACE:: None        ARB:: None         ARNI:: None    (Beta Blockers)  NON- Evidenced Based Beta Blocker (for EF% >40%/DIASTOLIC FAILURE): None    Evidenced Based Beta Blocker::(REQUIRED for EF% <40%/SYSTOLIC FAILURE) None  ...................................................................................................................................................    Failed to redirect to the Timeline version of the [employer] SmartLink.      Patient's weights and intake/output reviewed    Daily Weight log at bedside, patient/family participation in use of log: \"yes    Patient's current weight today shows a difference of 7 lbs less than last documented weight.      Intake/Output Summary (Last 24 hours) at 5/25/2024 0606  Last data filed at 5/25/2024 0410  Gross per 24 hour   Intake 240 ml   Output 950 ml   Net -710 ml       Education Booklet Provided: yes    Comorbidities Reviewed Yes    Patient has a past medical history of Anemia, Angina, Arthritis, Atrial fibrillation (HCC), CAD (coronary artery disease), CHF (congestive heart failure) (HCC), CKD (chronic kidney disease) stage 3, GFR 30-59 ml/min (HCC), Clostridium difficile diarrhea, Diabetes mellitus (HCC), Diabetic neuropathy (HCC), Disease of blood and blood forming organ, Dizziness, GERD (gastroesophageal reflux disease), Headache, Hearing loss, Hyperlipidemia, Hypertension, Kidney stone, Refusal of blood transfusions as patient is Mu-ism, Sleep apnea, Tinnitus, Venous ulcer (HCC), and Wound, open.     >>For CHF and Comorbidity documentation on Education Time and Topics, please see Education Tab      Pt resting in bed at this time on  2 L O2. Pt denies shortness of breath. Pt with pitting lower

## 2024-05-25 NOTE — PROGRESS NOTES
No bleeding noted at this time. Little tubing was not in the secure device when he came back from hd. It is now. Old dried blood on penis. Urine in catheter tubing light yellow and clear. Pt questioning whether or not he is going to [employer] today. Pt has been surrounded by visitors since about 1145. Pt is a very loved man. He has been very active with his visitors. He is aware about [facility] services and mentions them often. Waiting for doc to round and update on discharge. Pt resting in bed. His breakfast was heated up earlier and cut in bit size pieces.     Messaged attending.

## 2024-05-25 NOTE — PROGRESS NOTES
Detailed report called to receiving rn at [facility]. Pt ready for pickup pt is anxious and keeps calling out checking on the departure time. Family packed his belongings and took them with them. Pt has slippers and watch.

## 2024-05-25 NOTE — PLAN OF CARE
Problem: Discharge Planning  Goal: Discharge to home or other facility with appropriate resources  Outcome: Progressing     Problem: Pain  Goal: Verbalizes/displays adequate comfort level or baseline comfort level  Outcome: Progressing     Problem: Skin/Tissue Integrity  Goal: Absence of new skin breakdown  Description: 1.  Monitor for areas of redness and/or skin breakdown  2.  Assess vascular access sites hourly  3.  Every 4-6 hours minimum:  Change oxygen saturation probe site  4.  Every 4-6 hours:  If on nasal continuous positive airway pressure, respiratory therapy assess nares and determine need for appliance change or resting period.  Outcome: Progressing     Problem: Safety - Adult  Goal: Free from fall injury  Outcome: Progressing     Problem: Chronic Conditions and Co-morbidities  Goal: Patient's chronic conditions and co-morbidity symptoms are monitored and maintained or improved  Outcome: Progressing     Problem: Safety - Medical Restraint  Goal: Remains free of injury from restraints (Restraint for Interference with Medical Device)  Description: INTERVENTIONS:  1. Determine that other, less restrictive measures have been tried or would not be effective before applying the restraint  2. Evaluate the patient's condition at the time of restraint application  3. Inform patient/family regarding the reason for restraint  4. Q2H: Monitor safety, psychosocial status, comfort, nutrition and hydration  Outcome: Progressing

## 2024-05-26 LAB
BASOPHILS ABSOLUTE: 38 /UL (ref 0–200)
BASOPHILS RELATIVE PERCENT: 0.4 % (ref 0–1)
EOSINOPHILS ABSOLUTE: 182 /UL (ref 15–500)
EOSINOPHILS RELATIVE PERCENT: 1.9 % (ref 0–8)
HCT VFR BLD CALC: 27.4 % (ref 38.5–50)
HEMOGLOBIN: 9 G/DL (ref 13.2–17.1)
LYMPHOCYTES ABSOLUTE: 662 /UL (ref 850–3900)
LYMPHOCYTES RELATIVE PERCENT: 6.9 % (ref 15–45)
MCH RBC QN AUTO: 28.4 PG (ref 27–33)
MCHC RBC AUTO-ENTMCNC: 32.7 G/DL (ref 32–36)
MCV RBC AUTO: 86.6 FL (ref 80–100)
MONOCYTES ABSOLUTE: 682 /UL (ref 200–950)
MONOCYTES RELATIVE PERCENT: 7.1 % (ref 0–12)
NEUTROPHILS ABSOLUTE: 8035 /UL (ref 1500–7800)
NEUTROPHILS RELATIVE PERCENT: 83.7 % (ref 40–80)
NUCLEATED RED BLOOD CELLS: 0 /100 WBC (ref 0–0)
PDW BLD-RTO: 18.6 % (ref 11–15)
PLATELET # BLD: 126 10E3/UL (ref 140–400)
PMV BLD AUTO: 10.2 FL (ref 7.5–11.5)
RBC # BLD: 3.17 10E6/UL (ref 4.2–5.8)
WBC # BLD: 9.6 10E3/UL (ref 3.8–10.8)

## 2024-05-27 ENCOUNTER — HOSPITAL ENCOUNTER (INPATIENT)
Age: 69
LOS: 5 days | Discharge: NURSING, REHAB OR LONG-TERM CARE | DRG: 698 | End: 2024-06-01
Attending: EMERGENCY MEDICINE | Admitting: STUDENT IN AN ORGANIZED HEALTH CARE EDUCATION/TRAINING PROGRAM
Payer: MEDICARE

## 2024-05-27 DIAGNOSIS — N18.6 ESRD (END STAGE RENAL DISEASE) ON DIALYSIS (HCC): ICD-10-CM

## 2024-05-27 DIAGNOSIS — Z99.2 ESRD (END STAGE RENAL DISEASE) ON DIALYSIS (HCC): ICD-10-CM

## 2024-05-27 DIAGNOSIS — M54.12 CERVICAL RADICULITIS: Primary | ICD-10-CM

## 2024-05-27 DIAGNOSIS — N48.89 PENILE BLEEDING: ICD-10-CM

## 2024-05-27 DIAGNOSIS — R31.1 BENIGN ESSENTIAL MICROSCOPIC HEMATURIA: ICD-10-CM

## 2024-05-27 DIAGNOSIS — I50.31 CHF NYHA CLASS I, ACUTE, DIASTOLIC (HCC): ICD-10-CM

## 2024-05-27 DIAGNOSIS — D64.9 ANEMIA, UNSPECIFIED TYPE: ICD-10-CM

## 2024-05-27 DIAGNOSIS — I95.9 HYPOTENSION, UNSPECIFIED HYPOTENSION TYPE: ICD-10-CM

## 2024-05-27 PROBLEM — R31.9 HEMATURIA: Status: ACTIVE | Noted: 2024-05-27

## 2024-05-27 LAB
ALBUMIN SERPL-MCNC: 3.8 G/DL (ref 3.4–5)
ALBUMIN/GLOB SERPL: 1.4 {RATIO} (ref 1.1–2.2)
ALP SERPL-CCNC: 81 U/L (ref 40–129)
ALT SERPL-CCNC: 7 U/L (ref 10–40)
ANION GAP SERPL CALCULATED.3IONS-SCNC: 11 MMOL/L (ref 3–16)
AST SERPL-CCNC: 15 U/L (ref 15–37)
BACTERIA URNS QL MICRO: ABNORMAL /HPF
BASOPHILS # BLD: 0.1 K/UL (ref 0–0.2)
BASOPHILS NFR BLD: 0.8 %
BILIRUB SERPL-MCNC: 0.7 MG/DL (ref 0–1)
BILIRUB UR QL STRIP.AUTO: ABNORMAL
BUN SERPL-MCNC: 62 MG/DL (ref 7–20)
CALCIUM SERPL-MCNC: 9.1 MG/DL (ref 8.3–10.6)
CHLORIDE SERPL-SCNC: 98 MMOL/L (ref 99–110)
CLARITY UR: ABNORMAL
CO2 SERPL-SCNC: 27 MMOL/L (ref 21–32)
COLOR UR: YELLOW
CREAT SERPL-MCNC: 2 MG/DL (ref 0.8–1.3)
DEPRECATED RDW RBC AUTO: 18.9 % (ref 12.4–15.4)
EOSINOPHIL # BLD: 0.3 K/UL (ref 0–0.6)
EOSINOPHIL NFR BLD: 3.3 %
EPI CELLS #/AREA URNS HPF: ABNORMAL /HPF (ref 0–5)
GFR SERPLBLD CREATININE-BSD FMLA CKD-EPI: 35 ML/MIN/{1.73_M2}
GLUCOSE BLD-MCNC: 166 MG/DL (ref 70–99)
GLUCOSE BLD-MCNC: 183 MG/DL (ref 70–99)
GLUCOSE BLD-MCNC: 208 MG/DL (ref 70–99)
GLUCOSE SERPL-MCNC: 263 MG/DL (ref 70–99)
GLUCOSE UR STRIP.AUTO-MCNC: NEGATIVE MG/DL
HCT VFR BLD AUTO: 26.6 % (ref 40.5–52.5)
HGB BLD-MCNC: 8.4 G/DL (ref 13.5–17.5)
HGB UR QL STRIP.AUTO: ABNORMAL
HYALINE CASTS #/AREA URNS LPF: ABNORMAL /LPF (ref 0–2)
KETONES UR STRIP.AUTO-MCNC: ABNORMAL MG/DL
LEUKOCYTE ESTERASE UR QL STRIP.AUTO: ABNORMAL
LYMPHOCYTES # BLD: 0.7 K/UL (ref 1–5.1)
LYMPHOCYTES NFR BLD: 8.5 %
MCH RBC QN AUTO: 27.7 PG (ref 26–34)
MCHC RBC AUTO-ENTMCNC: 31.7 G/DL (ref 31–36)
MCV RBC AUTO: 87.3 FL (ref 80–100)
MONOCYTES # BLD: 0.7 K/UL (ref 0–1.3)
MONOCYTES NFR BLD: 8.2 %
NEUTROPHILS # BLD: 6.4 K/UL (ref 1.7–7.7)
NEUTROPHILS NFR BLD: 79.2 %
NITRITE UR QL STRIP.AUTO: NEGATIVE
PERFORMED ON: ABNORMAL
PH UR STRIP.AUTO: 5.5 [PH] (ref 5–8)
PLATELET # BLD AUTO: 129 K/UL (ref 135–450)
PMV BLD AUTO: 9.7 FL (ref 5–10.5)
POTASSIUM SERPL-SCNC: 4.3 MMOL/L (ref 3.5–5.1)
PROT SERPL-MCNC: 6.6 G/DL (ref 6.4–8.2)
PROT UR STRIP.AUTO-MCNC: 100 MG/DL
RBC # BLD AUTO: 3.05 M/UL (ref 4.2–5.9)
RBC #/AREA URNS HPF: >100 /HPF (ref 0–4)
SODIUM SERPL-SCNC: 136 MMOL/L (ref 136–145)
SP GR UR STRIP.AUTO: >=1.03 (ref 1–1.03)
UA COMPLETE W REFLEX CULTURE PNL UR: YES
UA DIPSTICK W REFLEX MICRO PNL UR: YES
URN SPEC COLLECT METH UR: ABNORMAL
UROBILINOGEN UR STRIP-ACNC: 1 E.U./DL
WBC # BLD AUTO: 8 K/UL (ref 4–11)
WBC #/AREA URNS HPF: ABNORMAL /HPF (ref 0–5)

## 2024-05-27 PROCEDURE — 87186 SC STD MICRODIL/AGAR DIL: CPT

## 2024-05-27 PROCEDURE — 99285 EMERGENCY DEPT VISIT HI MDM: CPT

## 2024-05-27 PROCEDURE — 85025 COMPLETE CBC W/AUTO DIFF WBC: CPT

## 2024-05-27 PROCEDURE — 94761 N-INVAS EAR/PLS OXIMETRY MLT: CPT

## 2024-05-27 PROCEDURE — 6370000000 HC RX 637 (ALT 250 FOR IP): Performed by: STUDENT IN AN ORGANIZED HEALTH CARE EDUCATION/TRAINING PROGRAM

## 2024-05-27 PROCEDURE — 80053 COMPREHEN METABOLIC PANEL: CPT

## 2024-05-27 PROCEDURE — 81001 URINALYSIS AUTO W/SCOPE: CPT

## 2024-05-27 PROCEDURE — 87086 URINE CULTURE/COLONY COUNT: CPT

## 2024-05-27 PROCEDURE — 87077 CULTURE AEROBIC IDENTIFY: CPT

## 2024-05-27 PROCEDURE — 2580000003 HC RX 258: Performed by: STUDENT IN AN ORGANIZED HEALTH CARE EDUCATION/TRAINING PROGRAM

## 2024-05-27 PROCEDURE — 6360000002 HC RX W HCPCS: Performed by: INTERNAL MEDICINE

## 2024-05-27 PROCEDURE — 1200000000 HC SEMI PRIVATE

## 2024-05-27 PROCEDURE — 2580000003 HC RX 258: Performed by: INTERNAL MEDICINE

## 2024-05-27 PROCEDURE — 2700000000 HC OXYGEN THERAPY PER DAY

## 2024-05-27 PROCEDURE — 2580000003 HC RX 258: Performed by: EMERGENCY MEDICINE

## 2024-05-27 RX ORDER — MECLIZINE HCL 12.5 MG/1
25 TABLET ORAL 3 TIMES DAILY PRN
Status: DISCONTINUED | OUTPATIENT
Start: 2024-05-27 | End: 2024-06-01 | Stop reason: HOSPADM

## 2024-05-27 RX ORDER — DEXTROSE MONOHYDRATE 100 MG/ML
INJECTION, SOLUTION INTRAVENOUS CONTINUOUS PRN
Status: DISCONTINUED | OUTPATIENT
Start: 2024-05-27 | End: 2024-06-01 | Stop reason: HOSPADM

## 2024-05-27 RX ORDER — DOCUSATE SODIUM 100 MG/1
100 CAPSULE, LIQUID FILLED ORAL 2 TIMES DAILY
Status: DISCONTINUED | OUTPATIENT
Start: 2024-05-27 | End: 2024-06-01 | Stop reason: HOSPADM

## 2024-05-27 RX ORDER — SODIUM CHLORIDE 0.9 % (FLUSH) 0.9 %
5-40 SYRINGE (ML) INJECTION PRN
Status: DISCONTINUED | OUTPATIENT
Start: 2024-05-27 | End: 2024-06-01 | Stop reason: HOSPADM

## 2024-05-27 RX ORDER — ONDANSETRON 2 MG/ML
4 INJECTION INTRAMUSCULAR; INTRAVENOUS EVERY 6 HOURS PRN
Status: DISCONTINUED | OUTPATIENT
Start: 2024-05-27 | End: 2024-06-01 | Stop reason: HOSPADM

## 2024-05-27 RX ORDER — CARVEDILOL 3.12 MG/1
3.12 TABLET ORAL 2 TIMES DAILY WITH MEALS
Status: DISCONTINUED | OUTPATIENT
Start: 2024-05-27 | End: 2024-06-01 | Stop reason: HOSPADM

## 2024-05-27 RX ORDER — POLYVINYL ALCOHOL 14 MG/ML
1 SOLUTION/ DROPS OPHTHALMIC 4 TIMES DAILY PRN
Status: DISCONTINUED | OUTPATIENT
Start: 2024-05-27 | End: 2024-05-27

## 2024-05-27 RX ORDER — POLYETHYLENE GLYCOL 3350 17 G/17G
17 POWDER, FOR SOLUTION ORAL DAILY PRN
Status: DISCONTINUED | OUTPATIENT
Start: 2024-05-27 | End: 2024-06-01 | Stop reason: HOSPADM

## 2024-05-27 RX ORDER — POTASSIUM CHLORIDE 20 MEQ/1
40 TABLET, EXTENDED RELEASE ORAL PRN
Status: DISCONTINUED | OUTPATIENT
Start: 2024-05-27 | End: 2024-06-01 | Stop reason: HOSPADM

## 2024-05-27 RX ORDER — TAMSULOSIN HYDROCHLORIDE 0.4 MG/1
0.8 CAPSULE ORAL DAILY
Status: DISCONTINUED | OUTPATIENT
Start: 2024-05-27 | End: 2024-06-01 | Stop reason: HOSPADM

## 2024-05-27 RX ORDER — ACETAMINOPHEN 650 MG/1
650 SUPPOSITORY RECTAL EVERY 6 HOURS PRN
Status: DISCONTINUED | OUTPATIENT
Start: 2024-05-27 | End: 2024-06-01 | Stop reason: HOSPADM

## 2024-05-27 RX ORDER — FERROUS SULFATE 325(65) MG
325 TABLET ORAL 2 TIMES DAILY
Status: DISCONTINUED | OUTPATIENT
Start: 2024-05-27 | End: 2024-06-01 | Stop reason: HOSPADM

## 2024-05-27 RX ORDER — BRIMONIDINE TARTRATE 2 MG/ML
1 SOLUTION/ DROPS OPHTHALMIC 2 TIMES DAILY
Status: DISCONTINUED | OUTPATIENT
Start: 2024-05-27 | End: 2024-06-01 | Stop reason: HOSPADM

## 2024-05-27 RX ORDER — METOLAZONE 2.5 MG/1
5 TABLET ORAL DAILY PRN
Status: DISCONTINUED | OUTPATIENT
Start: 2024-05-27 | End: 2024-06-01 | Stop reason: HOSPADM

## 2024-05-27 RX ORDER — ATORVASTATIN CALCIUM 80 MG/1
80 TABLET, FILM COATED ORAL NIGHTLY
Status: DISCONTINUED | OUTPATIENT
Start: 2024-05-27 | End: 2024-06-01 | Stop reason: HOSPADM

## 2024-05-27 RX ORDER — ACETAMINOPHEN 325 MG/1
650 TABLET ORAL EVERY 6 HOURS PRN
Status: DISCONTINUED | OUTPATIENT
Start: 2024-05-27 | End: 2024-06-01 | Stop reason: HOSPADM

## 2024-05-27 RX ORDER — CARBOXYMETHYLCELLULOSE SODIUM 10 MG/ML
1 GEL OPHTHALMIC 3 TIMES DAILY
Status: DISCONTINUED | OUTPATIENT
Start: 2024-05-27 | End: 2024-06-01 | Stop reason: HOSPADM

## 2024-05-27 RX ORDER — INSULIN GLARGINE 100 [IU]/ML
65 INJECTION, SOLUTION SUBCUTANEOUS NIGHTLY
Status: DISCONTINUED | OUTPATIENT
Start: 2024-05-27 | End: 2024-06-01 | Stop reason: HOSPADM

## 2024-05-27 RX ORDER — GLUCAGON 1 MG/ML
1 KIT INJECTION PRN
Status: DISCONTINUED | OUTPATIENT
Start: 2024-05-27 | End: 2024-06-01 | Stop reason: HOSPADM

## 2024-05-27 RX ORDER — MAGNESIUM SULFATE IN WATER 40 MG/ML
2000 INJECTION, SOLUTION INTRAVENOUS PRN
Status: DISCONTINUED | OUTPATIENT
Start: 2024-05-27 | End: 2024-06-01 | Stop reason: HOSPADM

## 2024-05-27 RX ORDER — BRIMONIDINE TARTRATE AND TIMOLOL MALEATE 2; 5 MG/ML; MG/ML
1 SOLUTION OPHTHALMIC 2 TIMES DAILY
Status: DISCONTINUED | OUTPATIENT
Start: 2024-05-27 | End: 2024-05-27

## 2024-05-27 RX ORDER — PANTOPRAZOLE SODIUM 40 MG/1
40 TABLET, DELAYED RELEASE ORAL DAILY
Status: DISCONTINUED | OUTPATIENT
Start: 2024-05-27 | End: 2024-06-01 | Stop reason: HOSPADM

## 2024-05-27 RX ORDER — MIDODRINE HYDROCHLORIDE 5 MG/1
5 TABLET ORAL 2 TIMES DAILY WITH MEALS
Status: DISCONTINUED | OUTPATIENT
Start: 2024-05-27 | End: 2024-06-01 | Stop reason: HOSPADM

## 2024-05-27 RX ORDER — TIMOLOL MALEATE 5 MG/ML
1 SOLUTION/ DROPS OPHTHALMIC 2 TIMES DAILY
Status: DISCONTINUED | OUTPATIENT
Start: 2024-05-27 | End: 2024-06-01 | Stop reason: HOSPADM

## 2024-05-27 RX ORDER — TORSEMIDE 100 MG/1
100 TABLET ORAL DAILY
Status: DISCONTINUED | OUTPATIENT
Start: 2024-05-27 | End: 2024-06-01 | Stop reason: HOSPADM

## 2024-05-27 RX ORDER — OXYCODONE HYDROCHLORIDE AND ACETAMINOPHEN 5; 325 MG/1; MG/1
1 TABLET ORAL EVERY 6 HOURS PRN
Status: DISCONTINUED | OUTPATIENT
Start: 2024-05-27 | End: 2024-06-01 | Stop reason: HOSPADM

## 2024-05-27 RX ORDER — ONDANSETRON 4 MG/1
4 TABLET, ORALLY DISINTEGRATING ORAL EVERY 8 HOURS PRN
Status: DISCONTINUED | OUTPATIENT
Start: 2024-05-27 | End: 2024-06-01 | Stop reason: HOSPADM

## 2024-05-27 RX ORDER — INSULIN LISPRO 100 [IU]/ML
0-16 INJECTION, SOLUTION INTRAVENOUS; SUBCUTANEOUS
Status: DISCONTINUED | OUTPATIENT
Start: 2024-05-27 | End: 2024-06-01 | Stop reason: HOSPADM

## 2024-05-27 RX ORDER — PREGABALIN 25 MG/1
50 CAPSULE ORAL DAILY
Status: DISCONTINUED | OUTPATIENT
Start: 2024-05-27 | End: 2024-06-01 | Stop reason: HOSPADM

## 2024-05-27 RX ORDER — ACETAZOLAMIDE 250 MG/1
250 TABLET ORAL DAILY
Status: DISCONTINUED | OUTPATIENT
Start: 2024-05-27 | End: 2024-06-01

## 2024-05-27 RX ORDER — SODIUM CHLORIDE 9 MG/ML
INJECTION, SOLUTION INTRAVENOUS PRN
Status: DISCONTINUED | OUTPATIENT
Start: 2024-05-27 | End: 2024-06-01 | Stop reason: HOSPADM

## 2024-05-27 RX ORDER — DORZOLAMIDE HCL 20 MG/ML
1 SOLUTION/ DROPS OPHTHALMIC 2 TIMES DAILY
Status: DISCONTINUED | OUTPATIENT
Start: 2024-05-27 | End: 2024-06-01 | Stop reason: HOSPADM

## 2024-05-27 RX ORDER — ALLOPURINOL 300 MG/1
300 TABLET ORAL DAILY
Status: DISCONTINUED | OUTPATIENT
Start: 2024-05-27 | End: 2024-06-01

## 2024-05-27 RX ORDER — CYCLOBENZAPRINE HCL 10 MG
5 TABLET ORAL EVERY 8 HOURS PRN
Status: DISCONTINUED | OUTPATIENT
Start: 2024-05-27 | End: 2024-06-01 | Stop reason: HOSPADM

## 2024-05-27 RX ORDER — INSULIN LISPRO 100 [IU]/ML
0-4 INJECTION, SOLUTION INTRAVENOUS; SUBCUTANEOUS NIGHTLY
Status: DISCONTINUED | OUTPATIENT
Start: 2024-05-27 | End: 2024-06-01 | Stop reason: HOSPADM

## 2024-05-27 RX ORDER — POTASSIUM CHLORIDE 7.45 MG/ML
10 INJECTION INTRAVENOUS PRN
Status: DISCONTINUED | OUTPATIENT
Start: 2024-05-27 | End: 2024-06-01 | Stop reason: HOSPADM

## 2024-05-27 RX ORDER — SODIUM CHLORIDE 0.9 % (FLUSH) 0.9 %
5-40 SYRINGE (ML) INJECTION EVERY 12 HOURS SCHEDULED
Status: DISCONTINUED | OUTPATIENT
Start: 2024-05-27 | End: 2024-06-01 | Stop reason: HOSPADM

## 2024-05-27 RX ORDER — 0.9 % SODIUM CHLORIDE 0.9 %
250 INTRAVENOUS SOLUTION INTRAVENOUS ONCE
Status: COMPLETED | OUTPATIENT
Start: 2024-05-27 | End: 2024-05-27

## 2024-05-27 RX ADMIN — DORZOLAMIDE HYDROCHLORIDE 1 DROP: 20 SOLUTION/ DROPS OPHTHALMIC at 21:50

## 2024-05-27 RX ADMIN — TIMOLOL MALEATE 1 DROP: 5 SOLUTION OPHTHALMIC at 14:13

## 2024-05-27 RX ADMIN — DORZOLAMIDE HYDROCHLORIDE 1 DROP: 20 SOLUTION/ DROPS OPHTHALMIC at 14:13

## 2024-05-27 RX ADMIN — PREGABALIN 50 MG: 25 CAPSULE ORAL at 16:46

## 2024-05-27 RX ADMIN — ACETAZOLAMIDE 250 MG: 250 TABLET ORAL at 16:39

## 2024-05-27 RX ADMIN — FERROUS SULFATE TAB 325 MG (65 MG ELEMENTAL FE) 325 MG: 325 (65 FE) TAB at 21:45

## 2024-05-27 RX ADMIN — BRIMONIDINE TARTRATE 1 DROP: 2 SOLUTION OPHTHALMIC at 14:13

## 2024-05-27 RX ADMIN — MIDODRINE HYDROCHLORIDE 5 MG: 5 TABLET ORAL at 16:45

## 2024-05-27 RX ADMIN — PANTOPRAZOLE SODIUM 40 MG: 40 TABLET, DELAYED RELEASE ORAL at 16:40

## 2024-05-27 RX ADMIN — INSULIN GLARGINE 65 UNITS: 100 INJECTION, SOLUTION SUBCUTANEOUS at 21:42

## 2024-05-27 RX ADMIN — FERROUS SULFATE TAB 325 MG (65 MG ELEMENTAL FE) 325 MG: 325 (65 FE) TAB at 16:46

## 2024-05-27 RX ADMIN — ALLOPURINOL 300 MG: 300 TABLET ORAL at 16:39

## 2024-05-27 RX ADMIN — ATORVASTATIN CALCIUM 80 MG: 80 TABLET, FILM COATED ORAL at 21:45

## 2024-05-27 RX ADMIN — SODIUM CHLORIDE 250 ML: 9 INJECTION, SOLUTION INTRAVENOUS at 07:49

## 2024-05-27 RX ADMIN — CARBOXYMETHYLCELLULOSE SODIUM 1 DROP: 10 GEL OPHTHALMIC at 14:10

## 2024-05-27 RX ADMIN — IRON SUCROSE 200 MG: 20 INJECTION, SOLUTION INTRAVENOUS at 16:33

## 2024-05-27 RX ADMIN — SODIUM CHLORIDE, PRESERVATIVE FREE 10 ML: 5 INJECTION INTRAVENOUS at 21:43

## 2024-05-27 RX ADMIN — BRIMONIDINE TARTRATE 1 DROP: 2 SOLUTION OPHTHALMIC at 21:50

## 2024-05-27 RX ADMIN — TIMOLOL MALEATE 1 DROP: 5 SOLUTION OPHTHALMIC at 21:48

## 2024-05-27 RX ADMIN — CARVEDILOL 3.12 MG: 3.12 TABLET, FILM COATED ORAL at 16:45

## 2024-05-27 RX ADMIN — TORSEMIDE 100 MG: 100 TABLET ORAL at 16:40

## 2024-05-27 RX ADMIN — TAMSULOSIN HYDROCHLORIDE 0.8 MG: 0.4 CAPSULE ORAL at 16:40

## 2024-05-27 RX ADMIN — CARBOXYMETHYLCELLULOSE SODIUM 1 DROP: 10 GEL OPHTHALMIC at 21:42

## 2024-05-27 ASSESSMENT — PAIN - FUNCTIONAL ASSESSMENT: PAIN_FUNCTIONAL_ASSESSMENT: 0-10

## 2024-05-27 ASSESSMENT — PAIN DESCRIPTION - DESCRIPTORS
DESCRIPTORS: ACHING;DISCOMFORT
DESCRIPTORS: ACHING;DISCOMFORT

## 2024-05-27 ASSESSMENT — PAIN DESCRIPTION - LOCATION
LOCATION: GENERALIZED
LOCATION: GENERALIZED

## 2024-05-27 ASSESSMENT — PAIN SCALES - GENERAL
PAINLEVEL_OUTOF10: 8
PAINLEVEL_OUTOF10: 9
PAINLEVEL_OUTOF10: 9

## 2024-05-27 NOTE — CONSULTS
8.6 MG TABS tablet Take 1 tablet by mouth 2 times daily 120 tablet 0    docusate sodium (COLACE, DULCOLAX) 100 MG CAPS Take 100 mg by mouth 2 times daily      [DISCONTINUED] nitroGLYCERIN (NITROSTAT) 0.4 MG SL tablet Place 1 tablet under the tongue every 5 minutes as needed (Patient not taking: Reported on 2022) 25 tablet 1    ferrous sulfate 325 (65 FE) MG tablet Take 1 tablet by mouth 2 times daily         Allergies:  Amitriptyline, Celecoxib, Cymbalta [duloxetine hcl], Elavil [amitriptyline hcl], Gabapentin, and Nsaids    Social History:    Social History     Socioeconomic History    Marital status: Single     Spouse name: Not on file    Number of children: Not on file    Years of education: Not on file    Highest education level: Not on file   Occupational History    Not on file   Tobacco Use    Smoking status: Former     Current packs/day: 0.00     Average packs/day: 1 pack/day for 16.0 years (16.0 ttl pk-yrs)     Types: Cigarettes     Start date: 1/10/1972     Quit date: 1/10/1988     Years since quittin.4    Smokeless tobacco: Never    Tobacco comments:     22 yrs ago   Vaping Use    Vaping Use: Never used   Substance and Sexual Activity    Alcohol use: No     Alcohol/week: 0.0 standard drinks of alcohol    Drug use: No    Sexual activity: Not Currently   Other Topics Concern    Not on file   Social History Narrative    Not on file     Social Determinants of Health     Financial Resource Strain: Not on file   Food Insecurity: No Food Insecurity (2024)    Hunger Vital Sign     Worried About Running Out of Food in the Last Year: Never true     Ran Out of Food in the Last Year: Never true   Transportation Needs: No Transportation Needs (2024)    PRAPARE - Transportation     Lack of Transportation (Medical): No     Lack of Transportation (Non-Medical): No   Physical Activity: Not on file   Stress: Not on file   Social Connections: Not on file   Intimate Partner Violence: Not on file   Housing  Stability: Low Risk  (5/27/2024)    Housing Stability Vital Sign     Unable to Pay for Housing in the Last Year: No     Number of Places Lived in the Last Year: 1     Unstable Housing in the Last Year: No       Family History:   Family History   Problem Relation Age of Onset    Heart Disease Mother     Heart Disease Father     Cancer Father     Diabetes Brother     Diabetes Brother        Review of Systems:   Pertinent positives stated above in HPI. Remainder of 10 point review of systems were reviewed and were negative.    Physical exam:   Constitutional:  VITALS:  /60   Pulse 65   Temp 97.6 °F (36.4 °C) (Oral)   Resp 17   Wt 110.5 kg (243 lb 9.7 oz)   SpO2 95%   BMI 37.04 kg/m²   Gen: alert, awake, nad  Skin: no rash, turgor wnl  Heent:  eomi, mmm  Neck: no bruits or jvd noted, thyroid normal  Cardiovascular:  S1, S2 without m/r/g  Respiratory: CTA B without w/r/r; respiratory effort normal  Abdomen:  +bs, soft, nt, nd, no hepatosplenomegaly  Ext: ++ lower extremity edema  Access: Right IJ TDC (5/21)  Psychiatric: mood and affect appropriate; judgement and insight intact  Musculoskeletal:  Rom, muscular strength limited; digits, nails normal    Data/  CBC:   Lab Results   Component Value Date/Time    WBC 8.0 05/27/2024 06:55 AM    RBC 3.05 05/27/2024 06:55 AM    HGB 8.4 05/27/2024 06:55 AM    HCT 26.6 05/27/2024 06:55 AM    MCV 87.3 05/27/2024 06:55 AM    MCH 27.7 05/27/2024 06:55 AM    MCHC 31.7 05/27/2024 06:55 AM    RDW 18.9 05/27/2024 06:55 AM     05/27/2024 06:55 AM    MPV 9.7 05/27/2024 06:55 AM     BMP:    Lab Results   Component Value Date/Time     05/27/2024 06:55 AM    K 4.3 05/27/2024 06:55 AM    CL 98 05/27/2024 06:55 AM    CO2 27 05/27/2024 06:55 AM    BUN 62 05/27/2024 06:55 AM    CREATININE 2.0 05/27/2024 06:55 AM    CALCIUM 9.1 05/27/2024 06:55 AM    GFRAA 43 09/01/2022 08:20 AM    GFRAA 53 06/06/2013 02:22 PM    LABGLOM 35 05/27/2024 06:55 AM    LABGLOM 33 08/31/2023 12:00

## 2024-05-27 NOTE — ED PROVIDER NOTES
EMERGENCY DEPARTMENT ENCOUNTER     [facility]  ED     Pt Name: Shay Townsend   MRN: [id number]   Birthdate 1955   Date of evaluation: 5/27/2024   Provider: Ronnell Sykes MD   PCP: Julia Stein MD   Note Started: 6:23 AM EDT 5/27/24     CHIEF COMPLAINT     Chief Complaint   Patient presents with    Hematuria     Pt has \"copious\" amounts of blood noted from pt garcia catheter.  Bleeding started yesterday.  Pt is also a dialysis pt.  Upon assessment blood noted to head of penis.  Swelling and possible injury noted.          HISTORY OF PRESENT ILLNESS:  History from : Patient   Limitations to history : None     Shay Townsend is a 69 y.o. male with pmh of CAD, CKD3 on dialysis, and diabetes who presents with penile bleeding from Garcia catheter. Patient is a resident of [facility] and states that he noticed some blood from his penis. He was told by others that he was having a state of altered mental status and was pulling out his equipment. He had a recent admission for acute on chronic heart failure when he was fluid overloaded and was discharged two days ago. He had RIJ tunneled dialysis catheter placed on 5/21/24 with plans for HD on TTS at [facility].    Nursing Notes were all reviewed and agreed with or any disagreements were addressed in the HPI.     ROS: Positives and Pertinent negatives as per HPI.    PAST MEDICAL HISTORY     Past medical history:  has a past medical history of Anemia, Angina, Arthritis, Atrial fibrillation ([facility]), CAD (coronary artery disease), CHF (congestive heart failure) ([facility]), CKD (chronic kidney disease) stage 3, GFR 30-59 ml/min ([facility]), Clostridium difficile diarrhea (3/16/12; 2/29/12), Diabetes mellitus ([facility]), Diabetic neuropathy ([facility]), Disease of blood and blood forming organ, Dizziness, GERD (gastroesophageal reflux disease), Headache, Hearing loss, Hyperlipidemia, Hypertension, Kidney stone (2002), Refusal of blood transfusions

## 2024-05-27 NOTE — ED NOTES
No complications with garcia catheter upon arrival.  Bleeding noted around penis. Adequate urine return.   Garcia catheter not replaced at this time, awaiting urology consult/ orders.

## 2024-05-27 NOTE — ED NOTES
Attempted to call report on patient.  RN unavailable and will return call when available. Patient transport at bedside at this time.

## 2024-05-27 NOTE — ED PROVIDER NOTES
THIS IS MY RESIDENT SUPERVISORY AND SHARED VISIT NOTE:    I personally saw the patient and made/approved the management plan and take responsibility for the patient management.    I independently performed a history and physical on Shay Townsend.   All diagnostic, treatment, and disposition decisions were made by myself in conjunction with the resident physician. I personally saw the patient and performed a substantive portion of the visit including all aspects of the medical decision making.      For further details of Shay Townsend's emergency department encounter, please see Ronnell Sykes MD's documentation.        History: Patient was evaluated due to concern for worsening bleeding from the penis with some discomfort at the insertion of the Little.  He had some dried blood on the bandages of his legsand is concerned about significant bleeding.  He denies any testicular discomfort although did complain of some penile pain.  No reported abdominal pain or vomiting.  No fever.  No chest pain or shortness of breath.        Physical exam:   Gen: No acute distress.  Alert and answering questions appropriately.  Pysch: Anxious mood and affect  HEENT: NCAT  Neck: supple  Cardiac: RRR  Lungs: Normal effort  Abdomen: soft and nontender with no R/D/G  Neuro: Moving all extremities, GCS equals 15  : No active bleeding from the tip of the penis, catheter in place, Joseline RN was chaperone, no testicular pain or tenderness, no scrotal erythema          I independently interpreted the following study(s) labs which show hemoglobin is worsening from 9 down to 8.4 concerning for worsening anemia and with him being a [employer], this is concerning since he does not accept blood transfusions.  Urinalysis showed possibility of infection although he does have a catheter and at this point I do not plan on treating for UTI since he does not have symptoms related to this.              I was the primary provider

## 2024-05-27 NOTE — ACP (ADVANCE CARE PLANNING)
Advance Care Planning     General Advance Care Planning (ACP) Conversation    Date of Conversation: 5/27/2024  Conducted with: Patient with Decision Making Capacity  Other persons present: None    Healthcare Decision Maker:   Primary Decision Maker: Jacque Betancur - Other - [phone]    Primary Decision Maker: Sloan Dey - Other - [phone]  Click here to complete Healthcare Decision Makers including selection of the Healthcare Decision Maker Relationship (ie \"Primary\").       Content/Action Overview:  Has ACP document(s) on file - reflects the patient's care preferences  Reviewed DNR/DNI and patient   Patient confirms ok with meds, compressions and Defibx1...DOES NOT WANT VENTILATOR.  Length of Voluntary ACP Conversation in minutes:  <16 minutes (Non-Billable)    Zack Johansen RN

## 2024-05-27 NOTE — PROGRESS NOTES
4 Eyes Skin Assessment     The patient is being assess for  Admission    I agree that 2 RN's have performed a thorough Head to Toe Skin Assessment on the patient. ALL assessment sites listed below have been assessed.       Areas assessed by both nurses: Zoila Vitale RN  [x]   Head, Face, and Ears   [x]   Shoulders, Back, and Chest  [x]   Arms, Elbows, and Hands   [x]   Coccyx, Sacrum, and Ischum  [x]   Legs, Feet, and Heels        Does the Patient have Skin Breakdown?  Yes a wound was noted on the Admission Assessment and an WOUND LDA was Initiated documentation include the Radha-wound, Wound Assessment, Measurements, Dressing Treatment, Drainage, and Color\",         John Prevention initiated:  Yes   Wound Care Orders initiated:  Yes      WOC nurse consulted for Pressure Injury (Stage 3,4, Unstageable, DTI, NWPT, and Complex wounds):  Yes      Nurse 1 eSignature: Electronically signed by Zoila Kaur RN on 5/27/24 at 10:48 AM EDT    **SHARE this note so that the co-signing nurse is able to place an eSignature**    Nurse 2 eSignature: Electronically signed by Winifred Griffin RN on 5/27/24 at 11:48 AM EDT

## 2024-05-27 NOTE — H&P
Hospital Medicine History & Physical      Date of Admission: 5/27/2024    Date of Service:  Pt seen/examined on 05/27/24     [x]Admitted to Inpatient with expected LOS greater than two midnights due to medical therapy.  []Placed in Observation status.    Chief Admission Complaint:  hematuria    Presenting Admission History:      69 y.o. male who presented to [facility] with hematuria.  PMHx significant for CKD, Diastolic CHF, CAD, Hx of CABG, T2DM, HTN, HLD.      Recent discharge on 5/25/2024 for multiple conditions.  Patient is poor historian.  Patient states that he returns due to complications of things 1 of which includes blood coming out of his penis.  Patient states he started to have increased amount of blood over the past couple days and this worsened when he would strain such as going to the bathroom.  Patient stated that yesterday that he got a hemoglobin test and it was low.  Due to multiple factors patient was taken to the emergency department for further evaluation and treatment.    Patient presented to the emergency department afebrile with temperature of 98 Fahrenheit.  Respiratory rate 18 satting at 96% on 3 L supplemental O2 via nasal cannula.  Heart rate 70.  BP 91/47.  Creatinine 2.0.  Glucose 263.  Liver panel unremarkable.  Hemoglobin 8.4.  UA showed large blood, greater than 100 RBCs.  Urine culture pending.  Patient was given 250 mL normal saline bolus while in the emergency department.  It was at this time patient be admitted to hospital service for further evaluation and treatment.    Assessment/Plan:      Current Principal Problem:  Hematuria    Hematuria  -Etiology likely due to trauma; patient pulled catheter with bulb inflated during last admission  -Hold AC at this time  -Urology consulted, appreciate recommendations in advance     Anemia - etiology clinically unable to determine, w/out evidence of active bleeding/hemolysis. Asymptomatic w/out indication for transfusion. Follow  mouth 2 times daily 5/1/18   Ashlie Bass, APRN - CNP   nitroGLYCERIN (NITROSTAT) 0.4 MG SL tablet Place 1 tablet under the tongue every 5 minutes as needed  Patient not taking: Reported on 8/24/2022 8/17/15 9/1/22  Kellie Patino, APRN - CNP   ferrous sulfate 325 (65 FE) MG tablet Take 1 tablet by mouth 2 times daily 12/22/10   Provider, MD Hermelinda       Labs: Personally reviewed and interpreted for clinical significance.   Recent Labs     05/26/24  1430 05/27/24  0655   WBC 9.6 8.0   HGB 9.0* 8.4*   HCT 27.4* 26.6*   * 129*     Recent Labs     05/27/24  0655      K 4.3   CL 98*   CO2 27   BUN 62*   CREATININE 2.0*   CALCIUM 9.1     No results for input(s): \"PROBNP\", \"TROPHS\" in the last 72 hours.  No results for input(s): \"LABA1C\" in the last 72 hours.  Recent Labs     05/27/24  0655   AST 15   ALT 7*   BILITOT 0.7   ALKPHOS 81     No results for input(s): \"INR\", \"LACTA\", \"TSH\" in the last 72 hours.     Danilo Porter, DO

## 2024-05-27 NOTE — PROGRESS NOTES
Pt admitted to 350 from ED. Pt arrived in stable condition, VSS, assessment completed and charted. Pt reports generalized pain at this time, awaiting orders. Little patent and draining. Bed in lowest position with wheels locked. Bedside table and call light within reach. Pt instructed to call for assistance. Pt denies any further needs at this time. Will continue to monitor.

## 2024-05-27 NOTE — CARE COORDINATION
Case Management Assessment  Initial Evaluation    Date/Time of Evaluation: 5/27/2024 12:02 PM  Assessment Completed by: Zack Johansen RN    If patient is discharged prior to next notation, then this note serves as note for discharge by case management.    Patient Name: Shay Townsend                   YOB: 1955  Diagnosis: Hematuria [R31.9]  ESRD (end stage renal disease) on dialysis (HCC) [N18.6, Z99.2]  Penile bleeding [N48.89]  Hypotension, unspecified hypotension type [I95.9]  Anemia, unspecified type [D64.9]                   Date / Time: 5/27/2024  5:55 AM    Patient Admission Status: Inpatient   Readmission Risk (Low < 19, Mod (19-27), High > 27): Readmission Risk Score: 30.1    Current PCP: Julia Stein MD  PCP verified by CM? Yes    Chart Reviewed: Yes      History Provided by: Patient  Patient Orientation: Alert and Oriented, Person, Place, Situation, Self    Patient Cognition:      Hospitalization in the last 30 days (Readmission):  Yes    If yes, Readmission Assessment in  Navigator will be completed.    Advance Directives:      Code Status: Limited   Patient's Primary Decision Maker is:      Primary Decision Maker: Jacque Betancur - Other - [phone]    Primary Decision Maker: Sloan Dey - Other - [phone]    Discharge Planning:    Patient lives with: Alone Type of Home: Long-Term Care  Primary Care Giver: Self  Patient Support Systems include: Friends/Neighbors   Current Financial resources: Medicaid, Medicare  Current community resources: None  Current services prior to admission: Skilled Nursing Facility            Current DME:              Type of Home Care services:  None    ADLS  Prior functional level: Assistance with the following:, Bathing, Dressing, Cooking, Housework, Shopping, Mobility  Current functional level: Assistance with the following:, Bathing, Dressing, Mobility, Shopping, Housework, Cooking    PT AM-PAC:   /24  OT AM-PAC:   /24    Family

## 2024-05-27 NOTE — CONSULTS
Consult Placed     Who: Manny  Date: 5/27/2024   Time: 11:16     Electronically signed by María Mercer on 5/27/2024 at 11:16 AM

## 2024-05-27 NOTE — ED NOTES
ED UROLOGY CONSULT  RE- Hematuria with Little, downtrending Hb, Mandaeism  [id number]-Paged  through [employer]  0752-Repaged  0750- returned page, transferred to

## 2024-05-27 NOTE — PROGRESS NOTES
Took over care for SIGRID Vitale. Agree with previous assessment. Pt resting in bed. Family updated. Little catheter draining clear, morelia colored urine. Bed alarm active for safety. Bed locked and in lowest position. Call light and bedside table within reach. Will continue to monitor.

## 2024-05-28 LAB
ALBUMIN SERPL-MCNC: 3.8 G/DL (ref 3.4–5)
ANION GAP SERPL CALCULATED.3IONS-SCNC: 13 MMOL/L (ref 3–16)
BUN SERPL-MCNC: 67 MG/DL (ref 7–20)
CALCIUM SERPL-MCNC: 9 MG/DL (ref 8.3–10.6)
CHLORIDE SERPL-SCNC: 99 MMOL/L (ref 99–110)
CO2 SERPL-SCNC: 24 MMOL/L (ref 21–32)
CREAT SERPL-MCNC: 2 MG/DL (ref 0.8–1.3)
GFR SERPLBLD CREATININE-BSD FMLA CKD-EPI: 35 ML/MIN/{1.73_M2}
GLUCOSE BLD-MCNC: 151 MG/DL (ref 70–99)
GLUCOSE BLD-MCNC: 166 MG/DL (ref 70–99)
GLUCOSE BLD-MCNC: 210 MG/DL (ref 70–99)
GLUCOSE BLD-MCNC: 231 MG/DL (ref 70–99)
GLUCOSE BLD-MCNC: 240 MG/DL (ref 70–99)
GLUCOSE SERPL-MCNC: 140 MG/DL (ref 70–99)
HCT VFR BLD AUTO: 25.4 % (ref 40.5–52.5)
HGB BLD-MCNC: 8.2 G/DL (ref 13.5–17.5)
PERFORMED ON: ABNORMAL
PHOSPHATE SERPL-MCNC: 4 MG/DL (ref 2.5–4.9)
POTASSIUM SERPL-SCNC: 4.3 MMOL/L (ref 3.5–5.1)
SODIUM SERPL-SCNC: 136 MMOL/L (ref 136–145)

## 2024-05-28 PROCEDURE — 6370000000 HC RX 637 (ALT 250 FOR IP): Performed by: STUDENT IN AN ORGANIZED HEALTH CARE EDUCATION/TRAINING PROGRAM

## 2024-05-28 PROCEDURE — 2580000003 HC RX 258: Performed by: STUDENT IN AN ORGANIZED HEALTH CARE EDUCATION/TRAINING PROGRAM

## 2024-05-28 PROCEDURE — 2580000003 HC RX 258: Performed by: INTERNAL MEDICINE

## 2024-05-28 PROCEDURE — 80069 RENAL FUNCTION PANEL: CPT

## 2024-05-28 PROCEDURE — 2500000003 HC RX 250 WO HCPCS: Performed by: STUDENT IN AN ORGANIZED HEALTH CARE EDUCATION/TRAINING PROGRAM

## 2024-05-28 PROCEDURE — 6360000002 HC RX W HCPCS: Performed by: INTERNAL MEDICINE

## 2024-05-28 PROCEDURE — 1200000000 HC SEMI PRIVATE

## 2024-05-28 PROCEDURE — 94761 N-INVAS EAR/PLS OXIMETRY MLT: CPT

## 2024-05-28 PROCEDURE — 90935 HEMODIALYSIS ONE EVALUATION: CPT

## 2024-05-28 PROCEDURE — 6360000002 HC RX W HCPCS

## 2024-05-28 PROCEDURE — 2700000000 HC OXYGEN THERAPY PER DAY

## 2024-05-28 PROCEDURE — 36415 COLL VENOUS BLD VENIPUNCTURE: CPT

## 2024-05-28 PROCEDURE — 85014 HEMATOCRIT: CPT

## 2024-05-28 PROCEDURE — 85018 HEMOGLOBIN: CPT

## 2024-05-28 RX ORDER — ALBUMIN (HUMAN) 12.5 G/50ML
SOLUTION INTRAVENOUS
Status: DISPENSED
Start: 2024-05-28 | End: 2024-05-28

## 2024-05-28 RX ORDER — HEPARIN SODIUM 1000 [USP'U]/ML
INJECTION, SOLUTION INTRAVENOUS; SUBCUTANEOUS
Status: DISPENSED
Start: 2024-05-28 | End: 2024-05-28

## 2024-05-28 RX ADMIN — BRIMONIDINE TARTRATE 1 DROP: 2 SOLUTION OPHTHALMIC at 14:15

## 2024-05-28 RX ADMIN — OXYCODONE HYDROCHLORIDE AND ACETAMINOPHEN 1 TABLET: 5; 325 TABLET ORAL at 11:29

## 2024-05-28 RX ADMIN — DOCUSATE SODIUM 100 MG: 100 CAPSULE, LIQUID FILLED ORAL at 21:32

## 2024-05-28 RX ADMIN — CARBOXYMETHYLCELLULOSE SODIUM 1 DROP: 10 GEL OPHTHALMIC at 14:11

## 2024-05-28 RX ADMIN — TIMOLOL MALEATE 1 DROP: 5 SOLUTION OPHTHALMIC at 14:15

## 2024-05-28 RX ADMIN — SODIUM CHLORIDE, PRESERVATIVE FREE 10 ML: 5 INJECTION INTRAVENOUS at 16:49

## 2024-05-28 RX ADMIN — PREGABALIN 50 MG: 25 CAPSULE ORAL at 14:01

## 2024-05-28 RX ADMIN — SILVER SULFADIAZINE: 10 CREAM TOPICAL at 14:18

## 2024-05-28 RX ADMIN — MIDODRINE HYDROCHLORIDE 5 MG: 5 TABLET ORAL at 18:44

## 2024-05-28 RX ADMIN — FERROUS SULFATE TAB 325 MG (65 MG ELEMENTAL FE) 325 MG: 325 (65 FE) TAB at 21:32

## 2024-05-28 RX ADMIN — ALLOPURINOL 300 MG: 300 TABLET ORAL at 14:01

## 2024-05-28 RX ADMIN — DORZOLAMIDE HYDROCHLORIDE 1 DROP: 20 SOLUTION/ DROPS OPHTHALMIC at 21:34

## 2024-05-28 RX ADMIN — TIMOLOL MALEATE 1 DROP: 5 SOLUTION OPHTHALMIC at 21:33

## 2024-05-28 RX ADMIN — TAMSULOSIN HYDROCHLORIDE 0.8 MG: 0.4 CAPSULE ORAL at 06:20

## 2024-05-28 RX ADMIN — IRON SUCROSE 200 MG: 20 INJECTION, SOLUTION INTRAVENOUS at 22:39

## 2024-05-28 RX ADMIN — INSULIN LISPRO 4 UNITS: 100 INJECTION, SOLUTION INTRAVENOUS; SUBCUTANEOUS at 14:02

## 2024-05-28 RX ADMIN — FERROUS SULFATE TAB 325 MG (65 MG ELEMENTAL FE) 325 MG: 325 (65 FE) TAB at 14:02

## 2024-05-28 RX ADMIN — DORZOLAMIDE HYDROCHLORIDE 1 DROP: 20 SOLUTION/ DROPS OPHTHALMIC at 14:15

## 2024-05-28 RX ADMIN — MIDODRINE HYDROCHLORIDE 5 MG: 5 TABLET ORAL at 14:01

## 2024-05-28 RX ADMIN — TORSEMIDE 100 MG: 100 TABLET ORAL at 14:02

## 2024-05-28 RX ADMIN — OXYCODONE HYDROCHLORIDE AND ACETAMINOPHEN 1 TABLET: 5; 325 TABLET ORAL at 18:43

## 2024-05-28 RX ADMIN — SODIUM CHLORIDE, PRESERVATIVE FREE 10 ML: 5 INJECTION INTRAVENOUS at 21:48

## 2024-05-28 RX ADMIN — CARBOXYMETHYLCELLULOSE SODIUM 1 DROP: 10 GEL OPHTHALMIC at 21:37

## 2024-05-28 RX ADMIN — DOCUSATE SODIUM 100 MG: 100 CAPSULE, LIQUID FILLED ORAL at 14:02

## 2024-05-28 RX ADMIN — POLYVINYL ALCOHOL, POVIDONE 1 DROP: 14; 6 SOLUTION/ DROPS OPHTHALMIC at 14:13

## 2024-05-28 RX ADMIN — SODIUM CHLORIDE: 9 INJECTION, SOLUTION INTRAVENOUS at 22:39

## 2024-05-28 RX ADMIN — INSULIN GLARGINE 65 UNITS: 100 INJECTION, SOLUTION SUBCUTANEOUS at 21:32

## 2024-05-28 RX ADMIN — BRIMONIDINE TARTRATE 1 DROP: 2 SOLUTION OPHTHALMIC at 21:33

## 2024-05-28 RX ADMIN — ACETAZOLAMIDE 250 MG: 250 TABLET ORAL at 14:30

## 2024-05-28 RX ADMIN — ATORVASTATIN CALCIUM 80 MG: 80 TABLET, FILM COATED ORAL at 21:32

## 2024-05-28 RX ADMIN — PANTOPRAZOLE SODIUM 40 MG: 40 TABLET, DELAYED RELEASE ORAL at 14:02

## 2024-05-28 ASSESSMENT — PAIN - FUNCTIONAL ASSESSMENT: PAIN_FUNCTIONAL_ASSESSMENT: ACTIVITIES ARE NOT PREVENTED

## 2024-05-28 ASSESSMENT — PAIN DESCRIPTION - LOCATION: LOCATION: FOOT

## 2024-05-28 ASSESSMENT — PAIN SCALES - GENERAL
PAINLEVEL_OUTOF10: 9
PAINLEVEL_OUTOF10: 7
PAINLEVEL_OUTOF10: 9

## 2024-05-28 ASSESSMENT — PAIN DESCRIPTION - ORIENTATION: ORIENTATION: RIGHT;LEFT

## 2024-05-28 ASSESSMENT — PAIN DESCRIPTION - DESCRIPTORS: DESCRIPTORS: NUMBNESS

## 2024-05-28 NOTE — PROGRESS NOTES
Treatment time: 3.5 hours  Net UF: 2500 ml     Pre weight: 109.8 kg  Post weight:107.3 kg      Access used: r tunneled cvc    Access function:good with  ml/min     Medications or blood products given: albumin, retacrit     Regular outpatient schedule: tts     Summary of response to treatment: Patient tolerated treatment well and without any complications. Patient remained stable throughout entire treatment and upon the exiting the dialysis suite via transport.     Report given to SIGRID Goff and copy of dialysis treatment record placed in chart, to be scanned into EMR.

## 2024-05-28 NOTE — PROGRESS NOTES
Pt  VSS. Pt a/o. Pt is leaving for dialysis.             Standard Precautions   Bed in lowest position and wheels locked. Call light within reach. Bedside table within reach. Non-skid socks in place. Pt denies any other needs at this time.  Pt calls out appropriately.

## 2024-05-28 NOTE — PROGRESS NOTES
Hospital Medicine Progress Note      Date of Admission: 5/27/2024  Hospital Day: 2    Chief Admission Complaint:  hematuria     Subjective:  Patient is in hospital chair alert and awake. No new issues or complaints today. Patient states he has pain all over, however chronic in nature. Dialysis went well and without issues. Patient states he feels \"fine\". Denies SOB. Currently on 2L O2 via NC. Spoke with nursing staff and was informed of no acute issues overnight.     Presenting Admission History:       69 y.o. male who presented to [facility] with hematuria.  PMHx significant for CKD, Diastolic CHF, CAD, Hx of CABG, T2DM, HTN, HLD.       Recent discharge on 5/25/2024 for multiple conditions.  Patient is poor historian.  Patient states that he returns due to complications of things 1 of which includes blood coming out of his penis.  Patient states he started to have increased amount of blood over the past couple days and this worsened when he would strain such as going to the bathroom.  Patient stated that yesterday that he got a hemoglobin test and it was low.  Due to multiple factors patient was taken to the emergency department for further evaluation and treatment.     Patient presented to the emergency department afebrile with temperature of 98 Fahrenheit.  Respiratory rate 18 satting at 96% on 3 L supplemental O2 via nasal cannula.  Heart rate 70.  BP 91/47.  Creatinine 2.0.  Glucose 263.  Liver panel unremarkable.  Hemoglobin 8.4.  UA showed large blood, greater than 100 RBCs.  Urine culture pending.  Patient was given 250 mL normal saline bolus while in the emergency department.  It was at this time patient be admitted to hospital service for further evaluation and treatment.    Assessment/Plan:      Current Principal Problem:  Hematuria      Hematuria  -Etiology likely due to trauma; patient pulled catheter with bulb inflated during last admission  -Hold AC at this time  -Urology consulted, appreciate  surgery/procedure with associated risk factors:    ----------------------------------------------------------------------  C. Data (any 2)  [] Discussed management of the case with consultants as follows:    [] Discussed the discharge plan in detail with case mgt including timing/barriers to discharge, need for support services and placement decision   [x] Imaging personally reviewed and interpreted, includes:   [x] Telemetry monitoring w/ NSR with a rate of 71   [x] Data Review (any 3)  [] Collateral history obtained from:    [x] All available Consultant notes from yesterday/today were reviewed  [x] All current labs were reviewed and interpreted for clinical significance   [x] Appropriate follow-up labs were ordered      Medications:  Personally reviewed in detail in conjunction w/ labs as documented for evidence of drug toxicity.     Infusion Medications    sodium chloride      dextrose       Scheduled Medications    albumin human 25%        albumin human 25%        heparin (porcine)        epoetin kashif-epbx        sodium chloride flush  5-40 mL IntraVENous 2 times per day    acetaZOLAMIDE  250 mg Oral Daily    allopurinol  300 mg Oral Daily    atorvastatin  80 mg Oral Nightly    carboxymethylcellulose PF  1 drop Both Eyes TID    carvedilol  3.125 mg Oral BID WC    docusate sodium  100 mg Oral BID    dorzolamide  1 drop Left Eye BID    ferrous sulfate  325 mg Oral BID    linaclotide  290 mcg Oral QAM AC    midodrine  5 mg Oral BID WC    naloxegol  25 mg Oral QAM AC    pantoprazole  40 mg Oral Daily    pregabalin  50 mg Oral Daily    Netarsudil Dimesylate  1 drop Left Eye Nightly    silver sulfADIAZINE   Topical Daily    tamsulosin  0.8 mg Oral Daily    torsemide  100 mg Oral Daily    insulin glargine  65 Units SubCUTAneous Nightly    insulin lispro  0-16 Units SubCUTAneous TID     insulin lispro  0-4 Units SubCUTAneous Nightly    brimonidine  1 drop Left Eye BID    timolol  1 drop Left Eye BID    iron sucrose

## 2024-05-28 NOTE — PROGRESS NOTES
4 Eyes Skin Assessment     The patient is being assess for  Low john    I agree that 2 RN's have performed a thorough Head to Toe Skin Assessment on the patient. ALL assessment sites listed below have been assessed.       Areas assessed by both nurses:   [x]   Head, Face, and Ears   [x]   Shoulders, Back, and Chest  [x]   Arms, Elbows, and Hands   [x]   Coccyx, Sacrum, and Ischum  [x]   Legs, Feet, and Heels        Does the Patient have Skin Breakdown?  No         John Prevention initiated:  Yes   Wound Care Orders initiated:  Yes      [facility] nurse consulted for Pressure Injury (Stage 3,4, Unstageable, DTI, NWPT, and Complex wounds):  Yes      Nurse 1 eSignature: Electronically signed by Shellie Iraheta RN on 5/27/24 at 9:56 PM EDT    **SHARE this note so that the co-signing nurse is able to place an eSignature**    Nurse 2 eSignature: Electronically signed by Haley Constantino RN on 5/27/24 at 11:46 PM EDT

## 2024-05-28 NOTE — CARE COORDINATION
Chart reviewed. Care managed by nephrology and IM. Spoke with patient. Received dialysis run today. Hgb 8.2 today. Plan to return LTC at [facility] with resumption of Daniel Schaefer, YAA sched. Zack Johansen RN

## 2024-05-28 NOTE — CONSULTS
Reason for Consult: garcia pulled out/large amounts of bleeding    CC \"unable to voice one\"  Hist from emr nursing    History of Present Illness: Shay Townsend is a 69 y.o. male with extensive medical history who was recently discharged from hospital with a garcia. He had traumatic removal of the garcia and hematuria last admit.  Jehovah witness and not allowing blood transfusions.   Sheri Joaquin was able re-insert an 18F catheter with ease.      Now represents with blood around garcia catheter and worry hemoglobin is still low.  He is on Hemodialysis t/th/sat.   Now no pain, catheter draining well      Past Medical History:   Past Medical History:   Diagnosis Date    Anemia     Angina     Arthritis     Atrial fibrillation (HCC)     CAD (coronary artery disease)     CHF (congestive heart failure) (HCC)     CKD (chronic kidney disease) stage 3, GFR 30-59 ml/min (HCC)     Clostridium difficile diarrhea 3/16/12; 2/29/12    positive stool toxin    Diabetes mellitus (HCC)     Diabetic neuropathy (HCC)     feet and legs    Disease of blood and blood forming organ     Dizziness     When he moves his head/ loses his balance    GERD (gastroesophageal reflux disease)     gastric ulcer    Headache     Hearing loss     Hyperlipidemia     Hypertension     Kidney stone 2002    Refusal of blood transfusions as patient is [employer]     Sleep apnea     Tinnitus     Venous ulcer (HCC)     LLE    Wound, open 01/13/2012       Past Surgical History:  Past Surgical History:   Procedure Laterality Date    CARDIAC SURGERY      Dec 27 2010, triple bypass    CHOLECYSTECTOMY  9/2011    HERNIA REPAIR  age2    IR TUNNELED CATHETER PLACEMENT GREATER THAN 5 YEARS  5/21/2024    IR TUNNELED CATHETER PLACEMENT GREATER THAN 5 YEARS 5/21/2024 [facility] SPECIAL PROCEDURES    KNEE ARTHROCENTESIS Right 10/6/2022    RIGHT SHOULDER INTRA ARTICULAR INJECTION SITE CONFIRMED BY FLUOROSCOPY performed by Bjorn Burciaga MD at [facility] OR    OTHER

## 2024-05-28 NOTE — PROGRESS NOTES
05/28/24 1022   Encounter Summary   Encounter Overview/Reason Advance Care Planning;Spiritual/Emotional Needs   Service Provided For Patient   Last Encounter    (5/28: pt OOR, chart review, discussion with CM, established that patient is alert, has documents scanned into chart, no blood products, decision makers named in documents.  Left note about helping with ACP as needed/desired by patient with contact number)   Begin Time 1015   End Time  1024   Total Time Calculated 9 min   Advance Care Planning   Type ACP conversation  (chart review, pt listed as 'Limited Code' and does not want ventilator.)     Thank you for consulting Spiritual Health    If you would like a 's presence for emotional, spiritual, grief or comfort care,   please dial \"[phone]\" and ask for the  on-call to be paged.    For help with Advanced Care Planning, Power of  for Healthcare or Living Will forms, you may also call us directly:    [phone] Uri  [phone]) [place]  [phone]) Outpatient    [facility]

## 2024-05-28 NOTE — PROGRESS NOTES
Nephrology Progress Note   [facility]      Sub/interval history  Seen and examined on HD on 5/28 with UF as tolerated  Hematuria has resolved  Little catheter in situ    ROS: No chest pain/shortness of breath/fever/nausea/vomiting  PSFH: No visitor    Scheduled Meds:   albumin human 25%        albumin human 25%        heparin (porcine)        sodium chloride flush  5-40 mL IntraVENous 2 times per day    acetaZOLAMIDE  250 mg Oral Daily    allopurinol  300 mg Oral Daily    atorvastatin  80 mg Oral Nightly    carboxymethylcellulose PF  1 drop Both Eyes TID    carvedilol  3.125 mg Oral BID WC    docusate sodium  100 mg Oral BID    dorzolamide  1 drop Left Eye BID    ferrous sulfate  325 mg Oral BID    linaclotide  290 mcg Oral QAM AC    midodrine  5 mg Oral BID WC    naloxegol  25 mg Oral QAM AC    pantoprazole  40 mg Oral Daily    pregabalin  50 mg Oral Daily    Netarsudil Dimesylate  1 drop Left Eye Nightly    silver sulfADIAZINE   Topical Daily    tamsulosin  0.8 mg Oral Daily    torsemide  100 mg Oral Daily    insulin glargine  65 Units SubCUTAneous Nightly    insulin lispro  0-16 Units SubCUTAneous TID WC    insulin lispro  0-4 Units SubCUTAneous Nightly    brimonidine  1 drop Left Eye BID    timolol  1 drop Left Eye BID    iron sucrose (VENOFER) 200 mg in sodium chloride 0.9 % 100 mL IVPB  200 mg IntraVENous Q24H    epoetin kashif-epbx  10,000 Units IntraVENous Once per day on Tue Thu Sat     Continuous Infusions:   sodium chloride      dextrose       PRN Meds:.albumin human 25%, albumin human 25%, heparin (porcine), sodium chloride flush, sodium chloride, potassium chloride **OR** potassium alternative oral replacement **OR** potassium chloride, magnesium sulfate, ondansetron **OR** ondansetron, polyethylene glycol, acetaminophen **OR** acetaminophen, cyclobenzaprine, glycerin (ADULT), meclizine, metOLazone, oxyCODONE-acetaminophen, sodium chloride, glucose, dextrose bolus **OR** dextrose bolus, glucagon    DM.  - Per Admitting service.     Hypotension.  - Continue Sulemanodrine     Monique Gordon MD  Office: [phone]  Fax:    [phone]  [facility]

## 2024-05-29 LAB
ALBUMIN SERPL-MCNC: 3.9 G/DL (ref 3.4–5)
ALBUMIN/GLOB SERPL: 1.4 {RATIO} (ref 1.1–2.2)
ALP SERPL-CCNC: 83 U/L (ref 40–129)
ALT SERPL-CCNC: <5 U/L (ref 10–40)
ANION GAP SERPL CALCULATED.3IONS-SCNC: 10 MMOL/L (ref 3–16)
AST SERPL-CCNC: 10 U/L (ref 15–37)
BACTERIA UR CULT: ABNORMAL
BASOPHILS # BLD: 0.1 K/UL (ref 0–0.2)
BASOPHILS NFR BLD: 0.7 %
BILIRUB SERPL-MCNC: 0.7 MG/DL (ref 0–1)
BUN SERPL-MCNC: 44 MG/DL (ref 7–20)
CALCIUM SERPL-MCNC: 9.1 MG/DL (ref 8.3–10.6)
CHLORIDE SERPL-SCNC: 96 MMOL/L (ref 99–110)
CO2 SERPL-SCNC: 25 MMOL/L (ref 21–32)
CREAT SERPL-MCNC: 2.3 MG/DL (ref 0.8–1.3)
DEPRECATED RDW RBC AUTO: 19.2 % (ref 12.4–15.4)
EOSINOPHIL # BLD: 0.3 K/UL (ref 0–0.6)
EOSINOPHIL NFR BLD: 4 %
GFR SERPLBLD CREATININE-BSD FMLA CKD-EPI: 30 ML/MIN/{1.73_M2}
GLUCOSE BLD-MCNC: 187 MG/DL (ref 70–99)
GLUCOSE BLD-MCNC: 213 MG/DL (ref 70–99)
GLUCOSE BLD-MCNC: 222 MG/DL (ref 70–99)
GLUCOSE BLD-MCNC: 241 MG/DL (ref 70–99)
GLUCOSE BLD-MCNC: 245 MG/DL (ref 70–99)
GLUCOSE SERPL-MCNC: 210 MG/DL (ref 70–99)
HCT VFR BLD AUTO: 25.7 % (ref 40.5–52.5)
HGB BLD-MCNC: 8.2 G/DL (ref 13.5–17.5)
LYMPHOCYTES # BLD: 0.7 K/UL (ref 1–5.1)
LYMPHOCYTES NFR BLD: 8.6 %
MCH RBC QN AUTO: 28.2 PG (ref 26–34)
MCHC RBC AUTO-ENTMCNC: 31.9 G/DL (ref 31–36)
MCV RBC AUTO: 88.4 FL (ref 80–100)
MONOCYTES # BLD: 0.7 K/UL (ref 0–1.3)
MONOCYTES NFR BLD: 8.3 %
NEUTROPHILS # BLD: 6.5 K/UL (ref 1.7–7.7)
NEUTROPHILS NFR BLD: 78.4 %
ORGANISM: ABNORMAL
PERFORMED ON: ABNORMAL
PLATELET # BLD AUTO: 147 K/UL (ref 135–450)
PMV BLD AUTO: 10.2 FL (ref 5–10.5)
POTASSIUM SERPL-SCNC: 4 MMOL/L (ref 3.5–5.1)
PROT SERPL-MCNC: 6.7 G/DL (ref 6.4–8.2)
RBC # BLD AUTO: 2.9 M/UL (ref 4.2–5.9)
SODIUM SERPL-SCNC: 131 MMOL/L (ref 136–145)
WBC # BLD AUTO: 8.2 K/UL (ref 4–11)

## 2024-05-29 PROCEDURE — 6370000000 HC RX 637 (ALT 250 FOR IP): Performed by: STUDENT IN AN ORGANIZED HEALTH CARE EDUCATION/TRAINING PROGRAM

## 2024-05-29 PROCEDURE — 1200000000 HC SEMI PRIVATE

## 2024-05-29 PROCEDURE — 80053 COMPREHEN METABOLIC PANEL: CPT

## 2024-05-29 PROCEDURE — 2700000000 HC OXYGEN THERAPY PER DAY

## 2024-05-29 PROCEDURE — 2580000003 HC RX 258: Performed by: STUDENT IN AN ORGANIZED HEALTH CARE EDUCATION/TRAINING PROGRAM

## 2024-05-29 PROCEDURE — 85025 COMPLETE CBC W/AUTO DIFF WBC: CPT

## 2024-05-29 PROCEDURE — 2580000003 HC RX 258: Performed by: INTERNAL MEDICINE

## 2024-05-29 PROCEDURE — 94761 N-INVAS EAR/PLS OXIMETRY MLT: CPT

## 2024-05-29 PROCEDURE — 36415 COLL VENOUS BLD VENIPUNCTURE: CPT

## 2024-05-29 PROCEDURE — 6360000002 HC RX W HCPCS: Performed by: INTERNAL MEDICINE

## 2024-05-29 RX ADMIN — TAMSULOSIN HYDROCHLORIDE 0.8 MG: 0.4 CAPSULE ORAL at 07:01

## 2024-05-29 RX ADMIN — DORZOLAMIDE HYDROCHLORIDE 1 DROP: 20 SOLUTION/ DROPS OPHTHALMIC at 08:28

## 2024-05-29 RX ADMIN — OXYCODONE HYDROCHLORIDE AND ACETAMINOPHEN 1 TABLET: 5; 325 TABLET ORAL at 01:48

## 2024-05-29 RX ADMIN — SILVER SULFADIAZINE: 10 CREAM TOPICAL at 08:54

## 2024-05-29 RX ADMIN — BRIMONIDINE TARTRATE 1 DROP: 2 SOLUTION OPHTHALMIC at 08:27

## 2024-05-29 RX ADMIN — PANTOPRAZOLE SODIUM 40 MG: 40 TABLET, DELAYED RELEASE ORAL at 08:36

## 2024-05-29 RX ADMIN — PREGABALIN 50 MG: 25 CAPSULE ORAL at 08:37

## 2024-05-29 RX ADMIN — FERROUS SULFATE TAB 325 MG (65 MG ELEMENTAL FE) 325 MG: 325 (65 FE) TAB at 21:23

## 2024-05-29 RX ADMIN — INSULIN LISPRO 4 UNITS: 100 INJECTION, SOLUTION INTRAVENOUS; SUBCUTANEOUS at 17:29

## 2024-05-29 RX ADMIN — OXYCODONE HYDROCHLORIDE AND ACETAMINOPHEN 1 TABLET: 5; 325 TABLET ORAL at 21:44

## 2024-05-29 RX ADMIN — CARBOXYMETHYLCELLULOSE SODIUM 1 DROP: 10 GEL OPHTHALMIC at 21:23

## 2024-05-29 RX ADMIN — CARVEDILOL 3.12 MG: 3.12 TABLET, FILM COATED ORAL at 08:37

## 2024-05-29 RX ADMIN — ATORVASTATIN CALCIUM 80 MG: 80 TABLET, FILM COATED ORAL at 21:23

## 2024-05-29 RX ADMIN — CARBOXYMETHYLCELLULOSE SODIUM 1 DROP: 10 GEL OPHTHALMIC at 08:35

## 2024-05-29 RX ADMIN — ALLOPURINOL 300 MG: 300 TABLET ORAL at 08:36

## 2024-05-29 RX ADMIN — TIMOLOL MALEATE 1 DROP: 5 SOLUTION OPHTHALMIC at 21:24

## 2024-05-29 RX ADMIN — BRIMONIDINE TARTRATE 1 DROP: 2 SOLUTION OPHTHALMIC at 21:24

## 2024-05-29 RX ADMIN — MIDODRINE HYDROCHLORIDE 5 MG: 5 TABLET ORAL at 08:36

## 2024-05-29 RX ADMIN — FERROUS SULFATE TAB 325 MG (65 MG ELEMENTAL FE) 325 MG: 325 (65 FE) TAB at 08:37

## 2024-05-29 RX ADMIN — DORZOLAMIDE HYDROCHLORIDE 1 DROP: 20 SOLUTION/ DROPS OPHTHALMIC at 21:24

## 2024-05-29 RX ADMIN — IRON SUCROSE 200 MG: 20 INJECTION, SOLUTION INTRAVENOUS at 18:47

## 2024-05-29 RX ADMIN — INSULIN GLARGINE 65 UNITS: 100 INJECTION, SOLUTION SUBCUTANEOUS at 21:23

## 2024-05-29 RX ADMIN — DOCUSATE SODIUM 100 MG: 100 CAPSULE, LIQUID FILLED ORAL at 21:24

## 2024-05-29 RX ADMIN — TIMOLOL MALEATE 1 DROP: 5 SOLUTION OPHTHALMIC at 08:29

## 2024-05-29 RX ADMIN — CARBOXYMETHYLCELLULOSE SODIUM 1 DROP: 10 GEL OPHTHALMIC at 15:33

## 2024-05-29 RX ADMIN — OXYCODONE HYDROCHLORIDE AND ACETAMINOPHEN 1 TABLET: 5; 325 TABLET ORAL at 08:36

## 2024-05-29 RX ADMIN — SODIUM CHLORIDE, PRESERVATIVE FREE 10 ML: 5 INJECTION INTRAVENOUS at 08:54

## 2024-05-29 RX ADMIN — SODIUM CHLORIDE, PRESERVATIVE FREE 10 ML: 5 INJECTION INTRAVENOUS at 21:23

## 2024-05-29 RX ADMIN — INSULIN LISPRO 4 UNITS: 100 INJECTION, SOLUTION INTRAVENOUS; SUBCUTANEOUS at 11:55

## 2024-05-29 RX ADMIN — DOCUSATE SODIUM 100 MG: 100 CAPSULE, LIQUID FILLED ORAL at 08:37

## 2024-05-29 RX ADMIN — ACETAZOLAMIDE 250 MG: 250 TABLET ORAL at 08:36

## 2024-05-29 ASSESSMENT — PAIN SCALES - GENERAL
PAINLEVEL_OUTOF10: 9
PAINLEVEL_OUTOF10: 8
PAINLEVEL_OUTOF10: 9
PAINLEVEL_OUTOF10: 8

## 2024-05-29 ASSESSMENT — PAIN DESCRIPTION - ORIENTATION
ORIENTATION: RIGHT;LEFT
ORIENTATION: RIGHT;LEFT

## 2024-05-29 ASSESSMENT — PAIN - FUNCTIONAL ASSESSMENT
PAIN_FUNCTIONAL_ASSESSMENT: PREVENTS OR INTERFERES SOME ACTIVE ACTIVITIES AND ADLS
PAIN_FUNCTIONAL_ASSESSMENT: PREVENTS OR INTERFERES WITH MANY ACTIVE NOT PASSIVE ACTIVITIES

## 2024-05-29 ASSESSMENT — PAIN DESCRIPTION - LOCATION
LOCATION: FOOT
LOCATION: FOOT;LEG
LOCATION: LEG;FOOT

## 2024-05-29 ASSESSMENT — PAIN DESCRIPTION - DESCRIPTORS
DESCRIPTORS: BURNING
DESCRIPTORS: BURNING

## 2024-05-29 NOTE — DISCHARGE INSTR - COC
Continuity of Care Form    Patient Name: Shay Townsend   :  1955  MRN:  [id number]    Admit date:  2024  Discharge date:  2024    Code Status Order: Limited   Advance Directives:     Admitting Physician:  Danilo Porter DO  PCP: Julia Stein MD    Discharging Nurse: Shell John  Discharging Hospital Unit/Room#: 0350/0350-01  Discharging Unit Phone Number: [phone]    Emergency Contact:   Extended Emergency Contact Information  Primary Emergency Contact: Jim Betancur And Noelle   [facility]  Home Phone: [phone]  Mobile Phone: [phone]  Relation: Other   needed? No  Secondary Emergency Contact: Sloan Dey  Mobile Phone: [phone]  Relation: Other    Past Surgical History:  Past Surgical History:   Procedure Laterality Date    CARDIAC SURGERY      Dec 27 2010, triple bypass    CHOLECYSTECTOMY  2011    HERNIA REPAIR  age1    IR TUNNELED CATHETER PLACEMENT GREATER THAN 5 YEARS  2024    IR TUNNELED CATHETER PLACEMENT GREATER THAN 5 YEARS 2024 [facility] SPECIAL PROCEDURES    KNEE ARTHROCENTESIS Right 10/6/2022    RIGHT SHOULDER INTRA ARTICULAR INJECTION SITE CONFIRMED BY FLUOROSCOPY performed by Bjorn Burciaga MD at [facility] OR    OTHER SURGICAL HISTORY  11 REPAIR LOWER STERNAL INCISION, REMOVAL OF ONE STERNAL WIRE,    OTHER SURGICAL HISTORY  10/10/2014    phacoemulsification of cataract with intraocular lens implant left eye    PAIN MANAGEMENT PROCEDURE N/A 2/10/2022    MIDLINE CERVICAL SIX SEVEN EPIDURAL STEROID INJECTION SITE CONFIRMED BY FLUOROSCOPY performed by Bjorn Burciaga MD at [facility] OR    PAIN MANAGEMENT PROCEDURE N/A 2022    MIDLINE TO RIGHT CERVICAL SIX SEVEN EPIDURAL STEROID INJECTION SITE CONFIRMED BY FLUOROSCOPY performed by Bjorn Burciaga MD at [facility] OR    UPPER GASTROINTESTINAL ENDOSCOPY         Immunization History:   Immunization History   Administered Date(s) Administered    COVID-19, PFIZER PURPLE top,  Thickness Description not for Pressure Injury Partial thickness 05/29/24 1021   Number of days: 0        Elimination:  Continence:   Bowel: Yes  Bladder: garcia   Urinary Catheter: Indication for Use of Catheter: Acute urinary retention/obstruction   Colostomy/Ileostomy/Ileal Conduit: No       Date of Last BM: 5/31/2024    Intake/Output Summary (Last 24 hours) at 5/29/2024 1327  Last data filed at 5/29/2024 1016  Gross per 24 hour   Intake 883.91 ml   Output 500 ml   Net 383.91 ml     I/O last 3 completed shifts:  In: 1155.9 [P.O.:942; I.V.:1.1; IV Piggyback:212.9]  Out: 775 [Urine:775]    Safety Concerns:     None    Impairments/Disabilities:      None    Nutrition Therapy:  Current Nutrition Therapy:   - Oral Diet:  General    Routes of Feeding: Oral  Liquids: Thin Liquids  Daily Fluid Restriction: yes - amount 1000ml  Last Modified Barium Swallow with Video (Video Swallowing Test): not done    Treatments at the Time of Hospital Discharge:   Respiratory Treatments:   Oxygen Therapy:  is on oxygen at 2 L/min per nasal cannula.  Ventilator:    - No ventilator support    Rehab Therapies: Physical Therapy and Occupational Therapy  Weight Bearing Status/Restrictions: No weight bearing restrictions  Other Medical Equipment (for information only, NOT a DME order):    Other Treatments:     Patient's personal belongings (please select all that are sent with patient):  Dentures upper    RN SIGNATURE:  Electronically signed by Shell John RN on 6/1/24 at 4:23 PM EDT    CASE MANAGEMENT/SOCIAL WORK SECTION    Inpatient Status Date: 5/27    Readmission Risk Assessment Score:  Readmission Risk              Risk of Unplanned Readmission:  43           Discharging to Facility/ Agency   Name: Julita Painter  Address:  Phone: [phone]  Fax:    Dialysis Facility (if applicable)   Name:  Address:  Dialysis Schedule:  Phone:  Fax:    / signature: Electronically signed by Madhuri Aleman RN on 6/1/24 at

## 2024-05-29 NOTE — DISCHARGE SUMMARY
V2.0  Discharge Summary    Name:  Shay Townsend /Age/Sex: 1955 (69 y.o. male)   Admit Date: 2024  Discharge Date: 24    MRN & CSN:  [id number] & [id number] Encounter Date and Time 24 1:28 PM EDT    Attending:  Raghav Beckham MD Discharging Provider: Raghav Beckham MD       Discharge Diagnosis:   Hematuria likely due to trauma, resolved  MARGARITO on CKD stage IIIb progressed to end-stage renal disease initiated on HD on 2024 now on TTS schedule  Anemia of chronic kidney disease  Hypertension  CAD  IDDM 2 with hyperglycemia  Acute on chronic chronic diastolic heart failure, improved  Chronic hypotension on midodrine        Hospital Course:     Brief HPI: 69-year-old male  with a PMH of chronic kidney disease, diastolic congestive heart failure, CAD, CABG, diabetes mellitus type 2, hypertension, hyperlipidemia.     He does have history of cardiorenal syndrome, he has had multiple hospitalizations for decompensated heart failure despite being on high dose diuretics. EF slightly reduced but severe pulmonary hypertension with dilated right and left atria. Scr is 2.1. Recent admission and lost 20 lbs but gained it right back. Now 263 lbs with discharge weight of 247 lbs.     He was admitted to [facility] from  to 5/10/2024 for acute on chronic diastolic heart failure with associated hypoxic respiratory failure.  He was discharged to the F and over the past week he gained significant weight..  He was having increased lower extremity swelling, abdominal distention or some shortness of breath.  He was sent from the F back to the ED for further evaluation.  Chest x-ray did reveal vascular congestion, and he was admitted for further management and evaluation.    Brief Problem Based Course:   Patient admitted for increased abdominal distention with extremity swelling and shortness of breath and noted to have MARGARITO on CKD 3B hence nephrology was consulted and patient was initiated on HD due to

## 2024-05-29 NOTE — PROGRESS NOTES
Urology Progress Note      Subjective: Shay Townsend denies complaints  Uro asked to re eval due to dropping hb    Vitals:  /69   Pulse 55   Temp 97.7 °F (36.5 °C) (Oral)   Resp 18   Ht 1.727 m (5' 8\")   Wt 109.8 kg (242 lb 1 oz)   SpO2 100%   BMI 36.81 kg/m²   Temp  Av.8 °F (36.6 °C)  Min: 97.4 °F (36.3 °C)  Max: 98.3 °F (36.8 °C)    Intake/Output Summary (Last 24 hours) at 2024 0926  Last data filed at 2024 0854  Gross per 24 hour   Intake 703.91 ml   Output 500 ml   Net 203.91 ml       Exam:   Physical:   Well developed, well nourished in no acute distress  Mood indicates no abnormalities. Pt doesn’t appear depressed  Orientated to time and place  Neck is supple, trachea is midline  Respiratory effort is normal       Male :  Penis appears normal and circumcised  Urethral meatus is normal in size and location. No active bleeding  Little draining yellow urine     Labs:  WBC:    Lab Results   Component Value Date/Time    WBC 8.0 2024 06:55 AM     Hemoglobin/Hematocrit:    Lab Results   Component Value Date/Time    HGB 8.2 2024 05:24 AM    HCT 25.4 2024 05:24 AM     BMP:    Lab Results   Component Value Date/Time     2024 05:24 AM    K 4.3 2024 05:24 AM    K 4.3 2024 06:55 AM    CL 99 2024 05:24 AM    CO2 24 2024 05:24 AM    BUN 67 2024 05:24 AM    CREATININE 2.0 2024 05:24 AM    CALCIUM 9.0 2024 05:24 AM    GFRAA 43 2022 08:20 AM    GFRAA 53 2013 02:22 PM    LABGLOM 35 2024 05:24 AM    LABGLOM 33 2023 12:00 AM     PT/INR:    Lab Results   Component Value Date/Time    PROTIME 16.6 2024 07:48 AM    INR 1.32 2024 07:48 AM     PTT:    Lab Results   Component Value Date/Time    APTT 49.4 2023 12:47 PM   [APTT      Impression/Plan:     - 69y.o. male admitted with blood per urethral meatus, with known CHF, ESRD.      - catheter is draining well  -I do not see any active

## 2024-05-29 NOTE — PROGRESS NOTES
Nephrology Progress Note   [facility]      Sub/interval history  Resting, patient refuses to get discharged    HD on 5/28 with 2.5 L net UF, postweight 107.3 kg  Hematuria has resolved  Little catheter in situ    ROS: No chest pain/shortness of breath/fever/nausea/vomiting  PSFH: + visitor    Scheduled Meds:   sodium chloride flush  5-40 mL IntraVENous 2 times per day    acetaZOLAMIDE  250 mg Oral Daily    allopurinol  300 mg Oral Daily    atorvastatin  80 mg Oral Nightly    carboxymethylcellulose PF  1 drop Both Eyes TID    carvedilol  3.125 mg Oral BID WC    docusate sodium  100 mg Oral BID    dorzolamide  1 drop Left Eye BID    ferrous sulfate  325 mg Oral BID    linaclotide  290 mcg Oral QAM AC    midodrine  5 mg Oral BID WC    naloxegol  25 mg Oral QAM AC    pantoprazole  40 mg Oral Daily    pregabalin  50 mg Oral Daily    Netarsudil Dimesylate  1 drop Left Eye Nightly    silver sulfADIAZINE   Topical Daily    tamsulosin  0.8 mg Oral Daily    torsemide  100 mg Oral Daily    insulin glargine  65 Units SubCUTAneous Nightly    insulin lispro  0-16 Units SubCUTAneous TID WC    insulin lispro  0-4 Units SubCUTAneous Nightly    brimonidine  1 drop Left Eye BID    timolol  1 drop Left Eye BID    iron sucrose (VENOFER) 200 mg in sodium chloride 0.9 % 100 mL IVPB  200 mg IntraVENous Q24H    epoetin kashif-epbx  10,000 Units IntraVENous Once per day on Tue Thu Sat     Continuous Infusions:   sodium chloride 5 mL/hr at 05/28/24 2307    dextrose       PRN Meds:.sodium chloride flush, sodium chloride, potassium chloride **OR** potassium alternative oral replacement **OR** potassium chloride, magnesium sulfate, ondansetron **OR** ondansetron, polyethylene glycol, acetaminophen **OR** acetaminophen, cyclobenzaprine, meclizine, metOLazone, oxyCODONE-acetaminophen, sodium chloride, glucose, dextrose bolus **OR** dextrose bolus, glucagon (rDNA), dextrose, Polyvinyl Alcohol-Povidone PF    Objective/     Vitals:    05/29/24

## 2024-05-29 NOTE — CONSULTS
[facility] Nurse  Consult Note       NAME:  Shay Townsend  MEDICAL RECORD NUMBER:  [id number]  AGE: 69 y.o.   GENDER: male  : 1955  TODAY'S DATE:  2024    Subjective:  So good to see you, I have been asking about you.  My legs are very painful from the toes to the thighs with burning neuropathy.  Some times I have burning in my upper body too.   Reason for WOCN Evaluation and Assessment: Bilateral leg wounds- seen last admission      Shay Townsend is a 69 y.o. male referred by:   [x] Physician  [x] Nursing  [] Other:     Wound Identification:  Wound Type: Known venous ulcers bilateral lower legs.  Contributing Factors: edema, venous stasis, diabetes, decreased mobility, shear force, and obesity    Wound History:   Current Wound Care Treatment:  wraps both legs    Patient Goal of Care:  [x] Wound Healing  [] Odor Control  [] Palliative Care  [] Pain Control   [x] Other: edema control        PAST MEDICAL HISTORY        Diagnosis Date    Anemia     Angina     Arthritis     Atrial fibrillation (HCC)     CAD (coronary artery disease)     CHF (congestive heart failure) (HCC)     CKD (chronic kidney disease) stage 3, GFR 30-59 ml/min (HCC)     Clostridium difficile diarrhea 3/16/12; 12    positive stool toxin    Diabetes mellitus (HCC)     Diabetic neuropathy (HCC)     feet and legs    Disease of blood and blood forming organ     Dizziness     When he moves his head/ loses his balance    GERD (gastroesophageal reflux disease)     gastric ulcer    Headache     Hearing loss     Hyperlipidemia     Hypertension     Kidney stone     Refusal of blood transfusions as patient is [place]     Sleep apnea     Tinnitus     Venous ulcer (HCC)     LLE    Wound, open 2012       PAST SURGICAL HISTORY    Past Surgical History:   Procedure Laterality Date    CARDIAC SURGERY      Dec 27 2010, triple bypass    CHOLECYSTECTOMY  2011    HERNIA REPAIR  age1    IR TUNNELED CATHETER    Wound 05/18/24 Abdomen Mid;Inner very small superficial open area (Active)   Wound Image   05/21/24 1504   Wound Etiology Other 05/23/24 1344   Dressing Status New dressing applied 05/23/24 1344   Wound Cleansed Cleansed with saline 05/23/24 1344   Dressing/Treatment Xeroform;Foam 05/23/24 1344   Offloading for Diabetic Foot Ulcers Offloading ordered 05/23/24 1344   Dressing Change Due 05/22/24 05/23/24 1344   Wound Length (cm) 1 cm 05/21/24 1504   Wound Width (cm) 1 cm 05/21/24 1504   Wound Depth (cm) 0.1 cm 05/21/24 1504   Wound Surface Area (cm^2) 1 cm^2 05/21/24 1504   Change in Wound Size % (l*w) -566.67 05/21/24 1504   Wound Volume (cm^3) 0.1 cm^3 05/21/24 1504   Distance Tunneling (cm) 0 cm 05/23/24 1344   Tunneling Position ___ O'Clock 0 05/23/24 1344   Undermining Starts ___ O'Clock 0 05/23/24 1344   Undermining Ends___ O'Clock 0 05/23/24 1344   Undermining Maxium Distance (cm) 0 05/23/24 1344   Wound Assessment Pink/red 05/23/24 1344   Drainage Amount Scant (moist but unmeasurable) 05/23/24 1344   Drainage Description Serous 05/23/24 1344   Odor None 05/23/24 1344   Radha-wound Assessment Intact 05/23/24 1344   Margins Unattached edges 05/23/24 1344   Wound Thickness Description not for Pressure Injury Partial thickness 05/23/24 1344   Number of days: 11       Wound 05/29/24 Foot Left;Anterior (Active)   Wound Image   05/29/24 1021   Wound Etiology Venous 05/29/24 1021   Dressing Status New dressing applied 05/29/24 1021   Wound Cleansed Wound cleanser 05/29/24 1021   Dressing/Treatment Xeroform;ABD;Roll gauze;Tape change;Ace wrap 05/29/24 1021   Offloading for Diabetic Foot Ulcers Offloading ordered 05/29/24 1021   Dressing Change Due 05/30/24 05/29/24 1021   Wound Length (cm) 5.5 cm 05/29/24 1021   Wound Width (cm) 4.5 cm 05/29/24 1021   Wound Depth (cm) 0.5 cm 05/29/24 1021   Wound Surface Area (cm^2) 24.75 cm^2 05/29/24 1021   Wound Volume (cm^3) 12.375 cm^3 05/29/24 1021   Distance Tunneling (cm) 0

## 2024-05-29 NOTE — CARE COORDINATION
Detailed Notice of Discharge given to the patient and appeal packet submitted.  Will await appeal determination. Diane Chi RN

## 2024-05-29 NOTE — CARE COORDINATION
Patient has decided to appeal d/c to medicare. Has already been provided IMM. Calling appeal number now. Spoke with Olive at [facility] notified that patient will not be there in am. Will need a start of care on Saturday. Stated no barriers to start of care on Saturday. Zack Johansen RN  [place] at [facility]. Zack Johansen RN  Transport moved to 5/30/24 at 430pm. Zack Johansen RN    Medicare notified hospital appeal. Documents faxed to [facility]. Will follow up tomorrow. Zack Johansen RN    Nursing notified to let dialysis know of need early tomorrow pending appeal. Zack Johansen RN

## 2024-05-29 NOTE — DISCHARGE INSTRUCTIONS
HD from 5/22/24,now on TTS schedule ([facility]) as outpatient with UF as tolerated  - S/P RIJ TDC placed on 5/21/24 per IR.        Heart Failure Resources:  Heart Failure Interactive Workbook:  Go to https://[employer].ViaSat/publication/?z=450491 for a Free Heart Failure Interactive Workbook provided by The American Heart Association. This interactive workbook will provide information on Healthier Living with Heart Failure. Please copy and paste link into search bar. Use your mouse to scroll through the pages.    HF Arena carmina:   Heart Failure Free smart phone carmina available for iPhone and Android download. Use your phone to track sodium intake, fluid intake, symptoms, and weight.     Low Sodium Diet / Recipes:  Go to www.[employer] website for “renal” diet which is Low Sodium! [employer] is a dialysis company, but this website offers free seasonal cookbooks. Each quarter, they will release 25-30 new recipes with a breakdown of calories, sodium, and glucose. You can also go to www.[employer]arm/recipes website for free recipes.     Home Exercise Program:   Identification of Green/Yellow/Red zones:  You should be able to identify when you feel good (green zone), if you have 1-2 symptoms of HF (yellow zone), or if you are in need of medical attention (red zone).  In your CHF education folder you were provided a “stop light tool” to outline this information.     We want to you to rate your exertion levels:    Our therapy team has discussed means of identification with you such as the \"Alana scale.\"  The Alana rating scale ranges from 6 to 20, where 6 means \"no exertion at all\" and 20 means \"maximal exertion.\" The goal is to use this to gauge how much effort it is taking for you to do your normal daily tasks.   You should be able to recognize when too much exertion is being expended.    Elements of Energy Conservation:   Prioritize/Plan: Decide what needs to be done today, and what can wait for a later date, write to

## 2024-05-29 NOTE — PLAN OF CARE
Problem: Safety - Adult  Goal: Free from fall injury  Outcome: Progressing     Problem: Pain  Goal: Verbalizes/displays adequate comfort level or baseline comfort level  Outcome: Progressing     Problem: ABCDS Injury Assessment  Goal: Absence of physical injury  Outcome: Progressing     Problem: Skin/Tissue Integrity  Goal: Absence of new skin breakdown  Description: 1.  Monitor for areas of redness and/or skin breakdown  2.  Assess vascular access sites hourly  3.  Every 4-6 hours minimum:  Change oxygen saturation probe site  4.  Every 4-6 hours:  If on nasal continuous positive airway pressure, respiratory therapy assess nares and determine need for appliance change or resting period.  Outcome: Progressing

## 2024-05-29 NOTE — CARE COORDINATION
Chart reviewed. Spoke with MD. Medically ready for d/c today. Plan for return to LTC at [facility].Zack Johansen RN        CASE MANAGEMENT DISCHARGE SUMMARY      Discharge to: LTC [facility] with new dialysis Daniel Schaefer.     Precertification completed: alireza  Hospital Exemption Notification (HENS) completed: na    IMM given: 5/28/24    New Durable Medical Equipment ordered/agency: none    Transportation:    Family/car:   Medical Transport explained to pt/family. Pt/family voice no agency preference.    Agency used:[facility]    time:430pm   Ambulance form completed: Yes    Confirmed discharge plan with:     Patient: yes     Family:  yes family friends bedside..     Facility/Agency, name:  [facility] faxed [phone]   Phone number for report to facility: [phone]     RN, name: Shell    Note: Discharging nurse to complete [facility], reconcile [facility], and place final copy with patient's discharge packet. RN to ensure that written prescriptions for  Level II medications are sent with patient to the facility as per protocol.  Zack Johansen RN      Last run sheet and line approval faxed. Zack Johansen RN

## 2024-05-30 LAB
GLUCOSE BLD-MCNC: 110 MG/DL (ref 70–99)
GLUCOSE BLD-MCNC: 150 MG/DL (ref 70–99)
GLUCOSE BLD-MCNC: 156 MG/DL (ref 70–99)
GLUCOSE BLD-MCNC: 179 MG/DL (ref 70–99)
GLUCOSE BLD-MCNC: 181 MG/DL (ref 70–99)
PERFORMED ON: ABNORMAL

## 2024-05-30 PROCEDURE — 6360000002 HC RX W HCPCS: Performed by: INTERNAL MEDICINE

## 2024-05-30 PROCEDURE — 6360000002 HC RX W HCPCS

## 2024-05-30 PROCEDURE — 2580000003 HC RX 258: Performed by: INTERNAL MEDICINE

## 2024-05-30 PROCEDURE — 90935 HEMODIALYSIS ONE EVALUATION: CPT

## 2024-05-30 PROCEDURE — P9047 ALBUMIN (HUMAN), 25%, 50ML: HCPCS

## 2024-05-30 PROCEDURE — 2700000000 HC OXYGEN THERAPY PER DAY

## 2024-05-30 PROCEDURE — 6370000000 HC RX 637 (ALT 250 FOR IP): Performed by: STUDENT IN AN ORGANIZED HEALTH CARE EDUCATION/TRAINING PROGRAM

## 2024-05-30 PROCEDURE — 1200000000 HC SEMI PRIVATE

## 2024-05-30 PROCEDURE — 2580000003 HC RX 258: Performed by: STUDENT IN AN ORGANIZED HEALTH CARE EDUCATION/TRAINING PROGRAM

## 2024-05-30 PROCEDURE — 94761 N-INVAS EAR/PLS OXIMETRY MLT: CPT

## 2024-05-30 RX ORDER — ALBUMIN (HUMAN) 12.5 G/50ML
SOLUTION INTRAVENOUS
Status: COMPLETED
Start: 2024-05-30 | End: 2024-05-30

## 2024-05-30 RX ORDER — HEPARIN SODIUM 1000 [USP'U]/ML
INJECTION, SOLUTION INTRAVENOUS; SUBCUTANEOUS
Status: DISPENSED
Start: 2024-05-30 | End: 2024-05-30

## 2024-05-30 RX ADMIN — CARVEDILOL 3.12 MG: 3.12 TABLET, FILM COATED ORAL at 18:06

## 2024-05-30 RX ADMIN — PANTOPRAZOLE SODIUM 40 MG: 40 TABLET, DELAYED RELEASE ORAL at 12:59

## 2024-05-30 RX ADMIN — EPOETIN ALFA-EPBX 10000 UNITS: 10000 INJECTION, SOLUTION INTRAVENOUS; SUBCUTANEOUS at 10:27

## 2024-05-30 RX ADMIN — ALLOPURINOL 300 MG: 300 TABLET ORAL at 12:53

## 2024-05-30 RX ADMIN — OXYCODONE HYDROCHLORIDE AND ACETAMINOPHEN 1 TABLET: 5; 325 TABLET ORAL at 04:35

## 2024-05-30 RX ADMIN — NALOXEGOL OXALATE 25 MG: 25 TABLET, FILM COATED ORAL at 12:52

## 2024-05-30 RX ADMIN — ATORVASTATIN CALCIUM 80 MG: 80 TABLET, FILM COATED ORAL at 20:58

## 2024-05-30 RX ADMIN — SILVER SULFADIAZINE: 10 CREAM TOPICAL at 12:54

## 2024-05-30 RX ADMIN — OXYCODONE HYDROCHLORIDE AND ACETAMINOPHEN 1 TABLET: 5; 325 TABLET ORAL at 18:43

## 2024-05-30 RX ADMIN — BRIMONIDINE TARTRATE 1 DROP: 2 SOLUTION OPHTHALMIC at 21:01

## 2024-05-30 RX ADMIN — TIMOLOL MALEATE 1 DROP: 5 SOLUTION OPHTHALMIC at 12:56

## 2024-05-30 RX ADMIN — MIDODRINE HYDROCHLORIDE 5 MG: 5 TABLET ORAL at 18:06

## 2024-05-30 RX ADMIN — BRIMONIDINE TARTRATE 1 DROP: 2 SOLUTION OPHTHALMIC at 12:56

## 2024-05-30 RX ADMIN — CARBOXYMETHYLCELLULOSE SODIUM 1 DROP: 10 GEL OPHTHALMIC at 12:53

## 2024-05-30 RX ADMIN — TAMSULOSIN HYDROCHLORIDE 0.8 MG: 0.4 CAPSULE ORAL at 05:50

## 2024-05-30 RX ADMIN — TIMOLOL MALEATE 1 DROP: 5 SOLUTION OPHTHALMIC at 20:58

## 2024-05-30 RX ADMIN — ALBUMIN (HUMAN) 25 G: 0.25 INJECTION, SOLUTION INTRAVENOUS at 08:48

## 2024-05-30 RX ADMIN — DORZOLAMIDE HYDROCHLORIDE 1 DROP: 20 SOLUTION/ DROPS OPHTHALMIC at 12:56

## 2024-05-30 RX ADMIN — MIDODRINE HYDROCHLORIDE 5 MG: 5 TABLET ORAL at 11:11

## 2024-05-30 RX ADMIN — CARBOXYMETHYLCELLULOSE SODIUM 1 DROP: 10 GEL OPHTHALMIC at 20:57

## 2024-05-30 RX ADMIN — FERROUS SULFATE TAB 325 MG (65 MG ELEMENTAL FE) 325 MG: 325 (65 FE) TAB at 20:58

## 2024-05-30 RX ADMIN — PREGABALIN 50 MG: 25 CAPSULE ORAL at 11:11

## 2024-05-30 RX ADMIN — SODIUM CHLORIDE, PRESERVATIVE FREE 10 ML: 5 INJECTION INTRAVENOUS at 12:57

## 2024-05-30 RX ADMIN — OXYCODONE HYDROCHLORIDE AND ACETAMINOPHEN 1 TABLET: 5; 325 TABLET ORAL at 11:11

## 2024-05-30 RX ADMIN — SODIUM CHLORIDE, PRESERVATIVE FREE 10 ML: 5 INJECTION INTRAVENOUS at 21:00

## 2024-05-30 RX ADMIN — DORZOLAMIDE HYDROCHLORIDE 1 DROP: 20 SOLUTION/ DROPS OPHTHALMIC at 20:58

## 2024-05-30 RX ADMIN — ACETAZOLAMIDE 250 MG: 250 TABLET ORAL at 12:52

## 2024-05-30 RX ADMIN — CYCLOBENZAPRINE 5 MG: 10 TABLET, FILM COATED ORAL at 20:57

## 2024-05-30 RX ADMIN — IRON SUCROSE 200 MG: 20 INJECTION, SOLUTION INTRAVENOUS at 18:11

## 2024-05-30 RX ADMIN — INSULIN GLARGINE 65 UNITS: 100 INJECTION, SOLUTION SUBCUTANEOUS at 20:57

## 2024-05-30 RX ADMIN — FERROUS SULFATE TAB 325 MG (65 MG ELEMENTAL FE) 325 MG: 325 (65 FE) TAB at 12:53

## 2024-05-30 RX ADMIN — DOCUSATE SODIUM 100 MG: 100 CAPSULE, LIQUID FILLED ORAL at 20:58

## 2024-05-30 ASSESSMENT — PAIN DESCRIPTION - LOCATION
LOCATION: FOOT;HEAD;NECK
LOCATION: GENERALIZED
LOCATION: FOOT;LEG
LOCATION: GENERALIZED

## 2024-05-30 ASSESSMENT — PAIN SCALES - GENERAL
PAINLEVEL_OUTOF10: 8
PAINLEVEL_OUTOF10: 9

## 2024-05-30 ASSESSMENT — PAIN DESCRIPTION - ORIENTATION: ORIENTATION: RIGHT;LEFT

## 2024-05-30 ASSESSMENT — PAIN DESCRIPTION - DESCRIPTORS
DESCRIPTORS: BURNING;ACHING;DISCOMFORT
DESCRIPTORS: BURNING

## 2024-05-30 ASSESSMENT — PAIN - FUNCTIONAL ASSESSMENT: PAIN_FUNCTIONAL_ASSESSMENT: PREVENTS OR INTERFERES SOME ACTIVE ACTIVITIES AND ADLS

## 2024-05-30 NOTE — PROGRESS NOTES
dextrose bolus **OR** dextrose bolus, glucagon (rDNA), dextrose, Polyvinyl Alcohol-Povidone PF    Objective/     Vitals:    05/30/24 0005 05/30/24 0408 05/30/24 0824 05/30/24 1111   BP: (!) 105/52 (!) 100/54 (!) 102/59    Pulse: 75 66 55    Resp:  18  18   Temp: 97.7 °F (36.5 °C) 98.1 °F (36.7 °C) 97.2 °F (36.2 °C)    TempSrc:  Oral     SpO2: 95% 95%     Weight:  96.7 kg (213 lb 3 oz)     Height:         24HR INTAKE/OUTPUT:    Intake/Output Summary (Last 24 hours) at 5/30/2024 1139  Last data filed at 5/30/2024 0551  Gross per 24 hour   Intake 222 ml   Output 175 ml   Net 47 ml       Gen: alert, awake  Neck: No JVD  Skin: Unremarkable  Cardiovascular:  S1, S2 without m/r/g   Respiratory: CTA B without w/r/r; respiratory effort normal  Abdomen:  soft, nt, nd,   Extremities:+ lower extremity edema; Ace wrapped  Neuro/Psy: AAoriented times 3 ; moves all 4 ext    Data/  Recent Labs     05/28/24  0524 05/29/24  1123   WBC  --  8.2   HGB 8.2* 8.2*   HCT 25.4* 25.7*   MCV  --  88.4   PLT  --  147       Recent Labs     05/28/24  0524 05/29/24  1123    131*   K 4.3 4.0   CL 99 96*   CO2 24 25   GLUCOSE 140* 210*   PHOS 4.0  --    BUN 67* 44*   CREATININE 2.0* 2.3*   LABGLOM 35* 30*       Assessment/plan    MARGARITO on CKD Stage 3b -> ESKD.  - Presumed from Cardiorenal syndrome.  - Follows with Dr. Zaragoza in the office.  - Started on HD from 5/22/24,now on TTS schedule ([facility]) as outpatient with UF as tolerated  - S/P RIJ TDC placed on 5/21/24 per IR.   -Will continue HD on a TTS schedule while in hospital     Decompensated Diastolic Heart Failure:  - Has severe pulmonary hypertension which is likely related to chronic volume overload   - RV failure with hypotension.    - Continue Torsemide 100mg daily.     Anemia/Hematuria:    - Hgb below goal.  -IV venofer  - RAMÍREZ qHD.  - Little mgmt/further input per Urology--> recommending Little catheter exchange every month     Hypervolemic Hyponatremia:  - Continue diuretics.  -  Fluid removal with HD.     DM.  - Per Admitting service.     Hypotension.  - Continue Josefina Gordon MD  Office: [phone]  Fax:    [phone]  [facility]

## 2024-05-30 NOTE — PLAN OF CARE
Bed locked and in low position, bedside table and call light within reach, nonskid socks and bed alarm on. Verbalizes adequate pain relief with meds as ordered. Low lou interventions in place. No new skin breakdown noted.  Problem: Safety - Adult  Goal: Free from fall injury  5/30/2024 0618 by Shellie Iraheta, RN  Outcome: Progressing    Problem: Pain  Goal: Verbalizes/displays adequate comfort level or baseline comfort level  5/30/2024 0618 by Shellie Iraheta RN  Outcome: Progressing    Problem: ABCDS Injury Assessment  Goal: Absence of physical injury  5/30/2024 0618 by Shellie Iraheta, RN  Outcome: Progressing    Problem: Skin/Tissue Integrity  Goal: Absence of new skin breakdown  Description: 1.  Monitor for areas of redness and/or skin breakdown  2.  Assess vascular access sites hourly  3.  Every 4-6 hours minimum:  Change oxygen saturation probe site  4.  Every 4-6 hours:  If on nasal continuous positive airway pressure, respiratory therapy assess nares and determine need for appliance change or resting period.  5/30/2024 0618 by Shellie Iraheta, RN  Outcome: Progressing

## 2024-05-30 NOTE — PROGRESS NOTES
Treatment time: 3.5 hours  Net UF: 1500 ml     Pre weight: 96.7 kg  Post weight:95.2 kg  EDW: TBD kg       Access used: RTDC    Access function: well with  ml/min     Medications or blood products given: Retacrit 10,000 units Albumin 25g     Regular outpatient schedule: TTS     Summary of response to treatment: Patient tolerated treatment well and without any complications. Patient remained stable throughout entire treatment.

## 2024-05-30 NOTE — PROGRESS NOTES
Pt called out d/t bleeding. Pt found to have large amount of blood and multiple clots from penis around garcia catheter. No blood noted in garcia catheter. Pt denied pain. Message sent to urology to update on patient status.

## 2024-05-30 NOTE — PROGRESS NOTES
4 Eyes Skin Assessment     The patient is being assess for  Low john    I agree that 2 RN's have performed a thorough Head to Toe Skin Assessment on the patient. ALL assessment sites listed below have been assessed.       Areas assessed by both nurses:   [x]   Head, Face, and Ears   [x]   Shoulders, Back, and Chest  [x]   Arms, Elbows, and Hands   [x]   Coccyx, Sacrum, and Ischum  [x]   Legs, Feet, and Heels        Does the Patient have Skin Breakdown?  No         John Prevention initiated:  Yes   Wound Care Orders initiated:  NA      [facility] nurse consulted for Pressure Injury (Stage 3,4, Unstageable, DTI, NWPT, and Complex wounds):  Yes  BLE vascular wounds    Nurse 1 eSignature: Electronically signed by Shellie Iraheta RN on 5/30/24 at 6:18 AM EDT    **SHARE this note so that the co-signing nurse is able to place an eSignature**    Nurse 2 eSignature: Electronically signed by Haley Constantino RN on 5/30/24 at 6:19 AM EDT

## 2024-05-30 NOTE — PROGRESS NOTES
Patient was discharged yesterday(5/29/2024) to [facility] but has appealed against the discharge and awaiting determination        CC: Hematuria           NAD  S1-S2     Seen in HD unit awake and alert interacting and answering appropriately        Continue present care till determination of appeal process settled

## 2024-05-30 NOTE — CARE COORDINATION
Chart reviewed. Medicare appeal in review at this time. Following for determination. Tentative transport arranged for 430pm to [facility]. Zack Johansen RN      Transport canceled for today. Medicare appeal remains pending. Zack Johansen RN

## 2024-05-31 LAB
ALBUMIN SERPL-MCNC: 4.1 G/DL (ref 3.4–5)
ALBUMIN/GLOB SERPL: 1.5 {RATIO} (ref 1.1–2.2)
ALP SERPL-CCNC: 96 U/L (ref 40–129)
ALT SERPL-CCNC: <5 U/L (ref 10–40)
ANION GAP SERPL CALCULATED.3IONS-SCNC: 12 MMOL/L (ref 3–16)
AST SERPL-CCNC: 11 U/L (ref 15–37)
BASOPHILS # BLD: 0 K/UL (ref 0–0.2)
BASOPHILS NFR BLD: 0.5 %
BILIRUB SERPL-MCNC: 0.6 MG/DL (ref 0–1)
BUN SERPL-MCNC: 34 MG/DL (ref 7–20)
CALCIUM SERPL-MCNC: 9.4 MG/DL (ref 8.3–10.6)
CHLORIDE SERPL-SCNC: 98 MMOL/L (ref 99–110)
CO2 SERPL-SCNC: 24 MMOL/L (ref 21–32)
CREAT SERPL-MCNC: 2.4 MG/DL (ref 0.8–1.3)
DEPRECATED RDW RBC AUTO: 19.4 % (ref 12.4–15.4)
EOSINOPHIL # BLD: 0.3 K/UL (ref 0–0.6)
EOSINOPHIL NFR BLD: 3.7 %
GFR SERPLBLD CREATININE-BSD FMLA CKD-EPI: 28 ML/MIN/{1.73_M2}
GLUCOSE BLD-MCNC: 110 MG/DL (ref 70–99)
GLUCOSE BLD-MCNC: 127 MG/DL (ref 70–99)
GLUCOSE BLD-MCNC: 135 MG/DL (ref 70–99)
GLUCOSE BLD-MCNC: 139 MG/DL (ref 70–99)
GLUCOSE BLD-MCNC: 159 MG/DL (ref 70–99)
GLUCOSE SERPL-MCNC: 122 MG/DL (ref 70–99)
HCT VFR BLD AUTO: 27.4 % (ref 40.5–52.5)
HGB BLD-MCNC: 8.7 G/DL (ref 13.5–17.5)
LYMPHOCYTES # BLD: 0.8 K/UL (ref 1–5.1)
LYMPHOCYTES NFR BLD: 9 %
MCH RBC QN AUTO: 28.4 PG (ref 26–34)
MCHC RBC AUTO-ENTMCNC: 31.7 G/DL (ref 31–36)
MCV RBC AUTO: 89.5 FL (ref 80–100)
MONOCYTES # BLD: 0.7 K/UL (ref 0–1.3)
MONOCYTES NFR BLD: 7.9 %
NEUTROPHILS # BLD: 6.8 K/UL (ref 1.7–7.7)
NEUTROPHILS NFR BLD: 78.9 %
PERFORMED ON: ABNORMAL
PLATELET # BLD AUTO: 179 K/UL (ref 135–450)
PMV BLD AUTO: 9.6 FL (ref 5–10.5)
POTASSIUM SERPL-SCNC: 3.7 MMOL/L (ref 3.5–5.1)
PROT SERPL-MCNC: 6.9 G/DL (ref 6.4–8.2)
RBC # BLD AUTO: 3.06 M/UL (ref 4.2–5.9)
SODIUM SERPL-SCNC: 134 MMOL/L (ref 136–145)
WBC # BLD AUTO: 8.7 K/UL (ref 4–11)

## 2024-05-31 PROCEDURE — 85025 COMPLETE CBC W/AUTO DIFF WBC: CPT

## 2024-05-31 PROCEDURE — 6370000000 HC RX 637 (ALT 250 FOR IP): Performed by: INTERNAL MEDICINE

## 2024-05-31 PROCEDURE — 2580000003 HC RX 258: Performed by: INTERNAL MEDICINE

## 2024-05-31 PROCEDURE — 36415 COLL VENOUS BLD VENIPUNCTURE: CPT

## 2024-05-31 PROCEDURE — 1200000000 HC SEMI PRIVATE

## 2024-05-31 PROCEDURE — 2580000003 HC RX 258: Performed by: STUDENT IN AN ORGANIZED HEALTH CARE EDUCATION/TRAINING PROGRAM

## 2024-05-31 PROCEDURE — 80053 COMPREHEN METABOLIC PANEL: CPT

## 2024-05-31 PROCEDURE — 6360000002 HC RX W HCPCS: Performed by: INTERNAL MEDICINE

## 2024-05-31 PROCEDURE — 6370000000 HC RX 637 (ALT 250 FOR IP): Performed by: STUDENT IN AN ORGANIZED HEALTH CARE EDUCATION/TRAINING PROGRAM

## 2024-05-31 PROCEDURE — 2700000000 HC OXYGEN THERAPY PER DAY

## 2024-05-31 PROCEDURE — 94761 N-INVAS EAR/PLS OXIMETRY MLT: CPT

## 2024-05-31 RX ORDER — NEPHROCAP 1 MG
1 CAP ORAL DAILY
Status: DISCONTINUED | OUTPATIENT
Start: 2024-05-31 | End: 2024-06-01 | Stop reason: HOSPADM

## 2024-05-31 RX ADMIN — FERROUS SULFATE TAB 325 MG (65 MG ELEMENTAL FE) 325 MG: 325 (65 FE) TAB at 21:29

## 2024-05-31 RX ADMIN — OXYCODONE HYDROCHLORIDE AND ACETAMINOPHEN 1 TABLET: 5; 325 TABLET ORAL at 12:13

## 2024-05-31 RX ADMIN — OXYCODONE HYDROCHLORIDE AND ACETAMINOPHEN 1 TABLET: 5; 325 TABLET ORAL at 21:29

## 2024-05-31 RX ADMIN — TAMSULOSIN HYDROCHLORIDE 0.8 MG: 0.4 CAPSULE ORAL at 05:54

## 2024-05-31 RX ADMIN — DORZOLAMIDE HYDROCHLORIDE 1 DROP: 20 SOLUTION/ DROPS OPHTHALMIC at 10:28

## 2024-05-31 RX ADMIN — DOCUSATE SODIUM 100 MG: 100 CAPSULE, LIQUID FILLED ORAL at 21:30

## 2024-05-31 RX ADMIN — PANTOPRAZOLE SODIUM 40 MG: 40 TABLET, DELAYED RELEASE ORAL at 10:35

## 2024-05-31 RX ADMIN — CYCLOBENZAPRINE 5 MG: 10 TABLET, FILM COATED ORAL at 12:14

## 2024-05-31 RX ADMIN — TIMOLOL MALEATE 1 DROP: 5 SOLUTION OPHTHALMIC at 10:27

## 2024-05-31 RX ADMIN — SODIUM CHLORIDE, PRESERVATIVE FREE 10 ML: 5 INJECTION INTRAVENOUS at 21:31

## 2024-05-31 RX ADMIN — ALLOPURINOL 300 MG: 300 TABLET ORAL at 10:36

## 2024-05-31 RX ADMIN — CARBOXYMETHYLCELLULOSE SODIUM 1 DROP: 10 GEL OPHTHALMIC at 16:15

## 2024-05-31 RX ADMIN — TIMOLOL MALEATE 1 DROP: 5 SOLUTION OPHTHALMIC at 21:36

## 2024-05-31 RX ADMIN — OXYCODONE HYDROCHLORIDE AND ACETAMINOPHEN 1 TABLET: 5; 325 TABLET ORAL at 05:54

## 2024-05-31 RX ADMIN — INSULIN GLARGINE 65 UNITS: 100 INJECTION, SOLUTION SUBCUTANEOUS at 21:29

## 2024-05-31 RX ADMIN — BRIMONIDINE TARTRATE 1 DROP: 2 SOLUTION OPHTHALMIC at 21:35

## 2024-05-31 RX ADMIN — MIDODRINE HYDROCHLORIDE 5 MG: 5 TABLET ORAL at 10:37

## 2024-05-31 RX ADMIN — PREGABALIN 50 MG: 25 CAPSULE ORAL at 10:36

## 2024-05-31 RX ADMIN — NALOXEGOL OXALATE 25 MG: 25 TABLET, FILM COATED ORAL at 05:54

## 2024-05-31 RX ADMIN — ATORVASTATIN CALCIUM 80 MG: 80 TABLET, FILM COATED ORAL at 21:28

## 2024-05-31 RX ADMIN — BRIMONIDINE TARTRATE 1 DROP: 2 SOLUTION OPHTHALMIC at 10:28

## 2024-05-31 RX ADMIN — FERROUS SULFATE TAB 325 MG (65 MG ELEMENTAL FE) 325 MG: 325 (65 FE) TAB at 10:35

## 2024-05-31 RX ADMIN — IRON SUCROSE 200 MG: 20 INJECTION, SOLUTION INTRAVENOUS at 17:52

## 2024-05-31 RX ADMIN — CARBOXYMETHYLCELLULOSE SODIUM 1 DROP: 10 GEL OPHTHALMIC at 21:28

## 2024-05-31 RX ADMIN — Medication 1 MG: at 10:35

## 2024-05-31 RX ADMIN — MIDODRINE HYDROCHLORIDE 5 MG: 5 TABLET ORAL at 17:53

## 2024-05-31 RX ADMIN — ACETAZOLAMIDE 250 MG: 250 TABLET ORAL at 10:46

## 2024-05-31 RX ADMIN — CYCLOBENZAPRINE 5 MG: 10 TABLET, FILM COATED ORAL at 21:29

## 2024-05-31 RX ADMIN — DORZOLAMIDE HYDROCHLORIDE 1 DROP: 20 SOLUTION/ DROPS OPHTHALMIC at 21:33

## 2024-05-31 RX ADMIN — SILVER SULFADIAZINE: 10 CREAM TOPICAL at 10:37

## 2024-05-31 RX ADMIN — SODIUM CHLORIDE, PRESERVATIVE FREE 10 ML: 5 INJECTION INTRAVENOUS at 10:38

## 2024-05-31 RX ADMIN — CARBOXYMETHYLCELLULOSE SODIUM 1 DROP: 10 GEL OPHTHALMIC at 10:28

## 2024-05-31 ASSESSMENT — PAIN SCALES - GENERAL
PAINLEVEL_OUTOF10: 9
PAINLEVEL_OUTOF10: 8
PAINLEVEL_OUTOF10: 5
PAINLEVEL_OUTOF10: 6
PAINLEVEL_OUTOF10: 9

## 2024-05-31 ASSESSMENT — PAIN DESCRIPTION - ORIENTATION: ORIENTATION: RIGHT;LEFT

## 2024-05-31 ASSESSMENT — PAIN DESCRIPTION - LOCATION
LOCATION: LEG;BACK;ARM
LOCATION: GENERALIZED
LOCATION: BACK;ARM
LOCATION: GENERALIZED;LEG;FOOT

## 2024-05-31 ASSESSMENT — PAIN DESCRIPTION - DESCRIPTORS
DESCRIPTORS: ACHING;BURNING
DESCRIPTORS: ACHING

## 2024-05-31 ASSESSMENT — PAIN DESCRIPTION - ONSET: ONSET: ON-GOING

## 2024-05-31 ASSESSMENT — PAIN DESCRIPTION - FREQUENCY: FREQUENCY: CONTINUOUS

## 2024-05-31 ASSESSMENT — PAIN SCALES - WONG BAKER: WONGBAKER_NUMERICALRESPONSE: HURTS A LITTLE BIT

## 2024-05-31 ASSESSMENT — PAIN DESCRIPTION - PAIN TYPE
TYPE: CHRONIC PAIN
TYPE: CHRONIC PAIN

## 2024-05-31 NOTE — PROGRESS NOTES
4 Eyes Skin Assessment     NAME:  Shay Townsend  YOB: 1955  MEDICAL RECORD NUMBER:  [id number]    The patient is being assessed for  other    I agree that at least one RN has performed a thorough Head to Toe Skin Assessment on the patient. ALL assessment sites listed below have been assessed.      Areas assessed by both nurses:    Head, Face, Ears, Shoulders, Back, Chest, Arms, Elbows, Hands, Sacrum. Buttock, Coccyx, Ischium, Legs. Feet and Heels, Under Medical Devices , and Other          Does the Patient have a Wound? yes       John Prevention initiated by RN: Yes  Wound Care Orders initiated by RN: Yes    Pressure Injury (Stage 3,4, Unstageable, DTI, NWPT, and Complex wounds) if present, place Wound referral order by RN under : No    New Ostomies, if present place, Ostomy referral order under : No     Nurse 1 eSignature: Electronically signed by Nancy Shook RN on 5/31/24 at 6:00 AM EDT    **SHARE this note so that the co-signing nurse can place an eSignature**    Nurse 2 eSignature: Electronically signed by Valentina Casper RN on 5/31/24 at 6:02 AM EDT

## 2024-05-31 NOTE — PROGRESS NOTES
Urology Progress Note      Subjective: Shay Townsend denies complaints  Urethral bleeding noted again last, has since resolved again     Vitals:  BP (!) 92/52   Pulse 58   Temp 97.6 °F (36.4 °C) (Oral)   Resp 18   Ht 1.727 m (5' 8\")   Wt 115.4 kg (254 lb 6.6 oz)   SpO2 100%   BMI 38.68 kg/m²   Temp  Av.9 °F (36.6 °C)  Min: 97.6 °F (36.4 °C)  Max: 98.1 °F (36.7 °C)    Intake/Output Summary (Last 24 hours) at 2024 1041  Last data filed at 2024 1210  Gross per 24 hour   Intake --   Output 150 ml   Net -150 ml         Exam:   Physical:   Well developed, well nourished in no acute distress  Mood indicates no abnormalities. Pt doesn’t appear depressed  Orientated to time and place  Neck is supple, trachea is midline  Respiratory effort is normal       Male :  Penis appears normal and circumcised  Urethral meatus is normal in size and location. No active bleeding  Garcia draining morelia urine     Labs:  WBC:    Lab Results   Component Value Date/Time    WBC 8.2 2024 11:23 AM     Hemoglobin/Hematocrit:    Lab Results   Component Value Date/Time    HGB 8.2 2024 11:23 AM    HCT 25.7 2024 11:23 AM     BMP:    Lab Results   Component Value Date/Time     2024 11:23 AM    K 4.0 2024 11:23 AM    CL 96 2024 11:23 AM    CO2 25 2024 11:23 AM    BUN 44 2024 11:23 AM    CREATININE 2.3 2024 11:23 AM    CALCIUM 9.1 2024 11:23 AM    GFRAA 43 2022 08:20 AM    GFRAA 53 2013 02:22 PM    LABGLOM 30 2024 11:23 AM    LABGLOM 33 2023 12:00 AM     PT/INR:    Lab Results   Component Value Date/Time    PROTIME 16.6 2024 07:48 AM    INR 1.32 2024 07:48 AM     PTT:    Lab Results   Component Value Date/Time    APTT 49.4 2023 12:47 PM   [APTT      Impression/Plan:     - 69y.o. male admitted with blood per urethral meatus after a traumatic garcia removal.  He has known CHF, ESRD.      - This patient traumatically self

## 2024-05-31 NOTE — PROGRESS NOTES
Nephrology Progress Note   [facility]      Sub/interval history  Seen and examined on HD on 5/30 with 2 L net UF goal, needed IV albumin and midodrine    Intermittent hemodialysis yesterday with 1.5 L of UF.  Postdialysis weight of 95.2 kg.  Hematuria has resolved  Little catheter in situ    ROS: No chest pain/shortness of breath/fever/nausea/vomiting  PSFH:no visitor    Scheduled Meds:   sodium chloride flush  5-40 mL IntraVENous 2 times per day    acetaZOLAMIDE  250 mg Oral Daily    allopurinol  300 mg Oral Daily    atorvastatin  80 mg Oral Nightly    carboxymethylcellulose PF  1 drop Both Eyes TID    carvedilol  3.125 mg Oral BID WC    docusate sodium  100 mg Oral BID    dorzolamide  1 drop Left Eye BID    ferrous sulfate  325 mg Oral BID    linaclotide  290 mcg Oral QAM AC    midodrine  5 mg Oral BID WC    naloxegol  25 mg Oral QAM AC    pantoprazole  40 mg Oral Daily    pregabalin  50 mg Oral Daily    Netarsudil Dimesylate  1 drop Left Eye Nightly    silver sulfADIAZINE   Topical Daily    tamsulosin  0.8 mg Oral Daily    torsemide  100 mg Oral Daily    insulin glargine  65 Units SubCUTAneous Nightly    insulin lispro  0-16 Units SubCUTAneous TID WC    insulin lispro  0-4 Units SubCUTAneous Nightly    brimonidine  1 drop Left Eye BID    timolol  1 drop Left Eye BID    iron sucrose (VENOFER) 200 mg in sodium chloride 0.9 % 100 mL IVPB  200 mg IntraVENous Q24H    epoetin kashif-epbx  10,000 Units IntraVENous Once per day on Tue Thu Sat     Continuous Infusions:   sodium chloride 5 mL/hr at 05/28/24 2307    dextrose       PRN Meds:.sodium chloride flush, sodium chloride, potassium chloride **OR** potassium alternative oral replacement **OR** potassium chloride, magnesium sulfate, ondansetron **OR** ondansetron, polyethylene glycol, acetaminophen **OR** acetaminophen, cyclobenzaprine, meclizine, metOLazone, oxyCODONE-acetaminophen, sodium chloride, glucose, dextrose bolus **OR** dextrose bolus, glucagon

## 2024-05-31 NOTE — CARE COORDINATION
HERMAN aware of appeal decision that agree that pt is ready for dc.    Herman spoke with pt's friend, Noelle Betancur [phone] who has helped pt appeal his dc. She received the decision and pt wishes to appeal to second level. She has spoken with [employer] and has started that process. Noelle is aware that pt will be responsible for cost of stay after 12noon on 6/1/24 at 12 noon.     Herman reviewed [employer] website and it is in the 2nd level appeal. Herman spoke with Isabel Lima, Supervisor and she is aware of 2nd level appeal.

## 2024-05-31 NOTE — PROGRESS NOTES
Pt assessment completed and charted. VSS. Pt a/o w/ forgetfulness. Pt c/o chronic pain. PRN medication administered per MAR. Pt complete Q2T. Pt continent of stool. Little catheter in place and patent. No bleeding noted today. Wound care to bilateral extremities. Bed in lowest position and wheels locked. Call light within reach. Bedside table within reach. Non-skid socks in place. Pt denies any other needs at this time.  Pt calls out appropriately.

## 2024-05-31 NOTE — CARE COORDINATION
Clarence spoke with Rita at [facility]. She is aware pt is at 2nd level appeal. She is aware potential for pt to return to facility tomorrow after HD. No barriers to pt returning this weekend.

## 2024-05-31 NOTE — CARE COORDINATION
Cm met with pt at bedside. Delivered HINN 12 letter. Pt unable to sign due to being blind. CM discussed letter with pt. Pt stated if he needs to leave tomorrow after HD he will leave and return to [facility].

## 2024-05-31 NOTE — CARE COORDINATION
Received message from [employer]. As noted, pt/friend have filed for second appeal. Per Kd, pt is not protected from liability while the reconsideration is in process. Pt's liability begins at noon tomorrow.   HINN 12 will be issued to pt once we have a daily rate from [employer].   Isabel MORLEY, CASSY, Manager CM.

## 2024-05-31 NOTE — PROGRESS NOTES
Pt returned from HD. Pt a/o. VSS. Pt complete Q2T. Little catheter in place and patent. Pt c/o generalized pain. PRN medication administered per MAR. Pt Q2T. Wound care to BLE. Bed in lowest position and wheels locked. Call light within reach. Bedside table within reach. Bed alarm in place. Pt calls out appropriately.

## 2024-05-31 NOTE — PROGRESS NOTES
Patient was discharged on 5/29/2024 to [facility] but appealed against discharge to Medicare awaiting the review process      CC: Intermittent hematuria    NAD  S1-S2      Continue present care as ordered till  Medicare appeal review is complete

## 2024-06-01 VITALS
SYSTOLIC BLOOD PRESSURE: 113 MMHG | DIASTOLIC BLOOD PRESSURE: 67 MMHG | TEMPERATURE: 98.4 F | HEART RATE: 77 BPM | HEIGHT: 68 IN | OXYGEN SATURATION: 92 % | RESPIRATION RATE: 20 BRPM | BODY MASS INDEX: 39.63 KG/M2 | WEIGHT: 261.47 LBS

## 2024-06-01 LAB
ALBUMIN SERPL-MCNC: 3.9 G/DL (ref 3.4–5)
ANION GAP SERPL CALCULATED.3IONS-SCNC: 14 MMOL/L (ref 3–16)
BUN SERPL-MCNC: 49 MG/DL (ref 7–20)
CALCIUM SERPL-MCNC: 9 MG/DL (ref 8.3–10.6)
CHLORIDE SERPL-SCNC: 95 MMOL/L (ref 99–110)
CO2 SERPL-SCNC: 24 MMOL/L (ref 21–32)
CREAT SERPL-MCNC: 2.6 MG/DL (ref 0.8–1.3)
DEPRECATED RDW RBC AUTO: 20.4 % (ref 12.4–15.4)
GFR SERPLBLD CREATININE-BSD FMLA CKD-EPI: 26 ML/MIN/{1.73_M2}
GLUCOSE BLD-MCNC: 157 MG/DL (ref 70–99)
GLUCOSE BLD-MCNC: 184 MG/DL (ref 70–99)
GLUCOSE BLD-MCNC: 74 MG/DL (ref 70–99)
GLUCOSE SERPL-MCNC: 118 MG/DL (ref 70–99)
HCT VFR BLD AUTO: 26.2 % (ref 40.5–52.5)
HGB BLD-MCNC: 8.2 G/DL (ref 13.5–17.5)
MCH RBC QN AUTO: 28.4 PG (ref 26–34)
MCHC RBC AUTO-ENTMCNC: 31.3 G/DL (ref 31–36)
MCV RBC AUTO: 90.7 FL (ref 80–100)
PERFORMED ON: ABNORMAL
PERFORMED ON: ABNORMAL
PERFORMED ON: NORMAL
PHOSPHATE SERPL-MCNC: 4.8 MG/DL (ref 2.5–4.9)
PLATELET # BLD AUTO: 168 K/UL (ref 135–450)
PMV BLD AUTO: 9.7 FL (ref 5–10.5)
POTASSIUM SERPL-SCNC: 4 MMOL/L (ref 3.5–5.1)
RBC # BLD AUTO: 2.89 M/UL (ref 4.2–5.9)
SODIUM SERPL-SCNC: 133 MMOL/L (ref 136–145)
WBC # BLD AUTO: 7 K/UL (ref 4–11)

## 2024-06-01 PROCEDURE — 80069 RENAL FUNCTION PANEL: CPT

## 2024-06-01 PROCEDURE — 6370000000 HC RX 637 (ALT 250 FOR IP)

## 2024-06-01 PROCEDURE — 85027 COMPLETE CBC AUTOMATED: CPT

## 2024-06-01 PROCEDURE — 6360000002 HC RX W HCPCS

## 2024-06-01 PROCEDURE — 2580000003 HC RX 258: Performed by: STUDENT IN AN ORGANIZED HEALTH CARE EDUCATION/TRAINING PROGRAM

## 2024-06-01 PROCEDURE — 36415 COLL VENOUS BLD VENIPUNCTURE: CPT

## 2024-06-01 PROCEDURE — 5A1D70Z PERFORMANCE OF URINARY FILTRATION, INTERMITTENT, LESS THAN 6 HOURS PER DAY: ICD-10-PCS | Performed by: STUDENT IN AN ORGANIZED HEALTH CARE EDUCATION/TRAINING PROGRAM

## 2024-06-01 PROCEDURE — 6370000000 HC RX 637 (ALT 250 FOR IP): Performed by: STUDENT IN AN ORGANIZED HEALTH CARE EDUCATION/TRAINING PROGRAM

## 2024-06-01 PROCEDURE — 90935 HEMODIALYSIS ONE EVALUATION: CPT

## 2024-06-01 RX ORDER — ALLOPURINOL 100 MG/1
100 TABLET ORAL DAILY
Status: DISCONTINUED | OUTPATIENT
Start: 2024-06-02 | End: 2024-06-01 | Stop reason: HOSPADM

## 2024-06-01 RX ORDER — HEPARIN SODIUM 1000 [USP'U]/ML
3200 INJECTION, SOLUTION INTRAVENOUS; SUBCUTANEOUS PRN
Status: DISCONTINUED | OUTPATIENT
Start: 2024-06-01 | End: 2024-06-01 | Stop reason: HOSPADM

## 2024-06-01 RX ORDER — MIDODRINE HYDROCHLORIDE 5 MG/1
TABLET ORAL
Status: COMPLETED
Start: 2024-06-01 | End: 2024-06-01

## 2024-06-01 RX ORDER — OXYCODONE HYDROCHLORIDE AND ACETAMINOPHEN 5; 325 MG/1; MG/1
1 TABLET ORAL EVERY 6 HOURS PRN
Qty: 10 TABLET | Refills: 0 | Status: SHIPPED | OUTPATIENT
Start: 2024-06-01 | End: 2024-06-01

## 2024-06-01 RX ORDER — OXYCODONE HYDROCHLORIDE AND ACETAMINOPHEN 5; 325 MG/1; MG/1
1 TABLET ORAL EVERY 6 HOURS PRN
Qty: 10 TABLET | Refills: 0 | Status: SHIPPED | OUTPATIENT
Start: 2024-06-01 | End: 2024-06-04

## 2024-06-01 RX ADMIN — SODIUM CHLORIDE, PRESERVATIVE FREE 10 ML: 5 INJECTION INTRAVENOUS at 10:30

## 2024-06-01 RX ADMIN — CARBOXYMETHYLCELLULOSE SODIUM 1 DROP: 10 GEL OPHTHALMIC at 10:24

## 2024-06-01 RX ADMIN — MIDODRINE HYDROCHLORIDE 5 MG: 5 TABLET ORAL at 11:39

## 2024-06-01 RX ADMIN — EPOETIN ALFA-EPBX 10000 UNITS: 10000 INJECTION, SOLUTION INTRAVENOUS; SUBCUTANEOUS at 15:02

## 2024-06-01 RX ADMIN — BRIMONIDINE TARTRATE 1 DROP: 2 SOLUTION OPHTHALMIC at 10:26

## 2024-06-01 RX ADMIN — TIMOLOL MALEATE 1 DROP: 5 SOLUTION OPHTHALMIC at 10:27

## 2024-06-01 RX ADMIN — DORZOLAMIDE HYDROCHLORIDE 1 DROP: 20 SOLUTION/ DROPS OPHTHALMIC at 10:26

## 2024-06-01 RX ADMIN — OXYCODONE HYDROCHLORIDE AND ACETAMINOPHEN 1 TABLET: 5; 325 TABLET ORAL at 10:24

## 2024-06-01 RX ADMIN — TAMSULOSIN HYDROCHLORIDE 0.8 MG: 0.4 CAPSULE ORAL at 06:13

## 2024-06-01 RX ADMIN — SILVER SULFADIAZINE: 10 CREAM TOPICAL at 10:28

## 2024-06-01 ASSESSMENT — PAIN SCALES - GENERAL
PAINLEVEL_OUTOF10: 6
PAINLEVEL_OUTOF10: 0
PAINLEVEL_OUTOF10: 8

## 2024-06-01 NOTE — PROGRESS NOTES
Nephrology Progress Note   [facility]      Sub/interval history  The patient was seen and examined during dialysis; he feels well today with no CP, SOB, nausea or vomiting.    ROS: No fever or chills.  Social: No family at bedside.    Scheduled Meds:   Virt-Caps  1 capsule Oral Daily    sodium chloride flush  5-40 mL IntraVENous 2 times per day    acetaZOLAMIDE  250 mg Oral Daily    allopurinol  300 mg Oral Daily    atorvastatin  80 mg Oral Nightly    carboxymethylcellulose PF  1 drop Both Eyes TID    carvedilol  3.125 mg Oral BID WC    docusate sodium  100 mg Oral BID    dorzolamide  1 drop Left Eye BID    ferrous sulfate  325 mg Oral BID    linaclotide  290 mcg Oral QAM AC    midodrine  5 mg Oral BID WC    naloxegol  25 mg Oral QAM AC    pantoprazole  40 mg Oral Daily    pregabalin  50 mg Oral Daily    Netarsudil Dimesylate  1 drop Left Eye Nightly    silver sulfADIAZINE   Topical Daily    tamsulosin  0.8 mg Oral Daily    torsemide  100 mg Oral Daily    insulin glargine  65 Units SubCUTAneous Nightly    insulin lispro  0-16 Units SubCUTAneous TID WC    insulin lispro  0-4 Units SubCUTAneous Nightly    brimonidine  1 drop Left Eye BID    timolol  1 drop Left Eye BID    epoetin kashif-epbx  10,000 Units IntraVENous Once per day on Tue Thu Sat     Continuous Infusions:   sodium chloride 5 mL/hr at 05/28/24 2307    dextrose       PRN Meds:.sodium chloride flush, sodium chloride, potassium chloride **OR** potassium alternative oral replacement **OR** potassium chloride, magnesium sulfate, ondansetron **OR** ondansetron, polyethylene glycol, acetaminophen **OR** acetaminophen, cyclobenzaprine, meclizine, metOLazone, oxyCODONE-acetaminophen, sodium chloride, glucose, dextrose bolus **OR** dextrose bolus, glucagon (rDNA), dextrose, Polyvinyl Alcohol-Povidone PF    Objective/     Vitals:    05/31/24 2159 06/01/24 0046 06/01/24 0532 06/01/24 1015   BP:  101/65  (!) 105/54   Pulse:  56  60   Resp: 16 15  16   Temp:

## 2024-06-01 NOTE — DISCHARGE SUMMARY
V2.0  Discharge Summary    Name:  Shay Townsend /Age/Sex: 1955 (69 y.o. male)   Admit Date: 2024  Discharge Date: 24    MRN & CSN:  [id number] & [id number] Encounter Date and Time 24 11:56 AM EDT    Attending:  Basim Donald MD Discharging Provider: Basim Donald MD       Hospital Course:     Patient is a 69-year-old female past medical history of  diastolic congestive heart failure, CAD, CABG, diabetes, hypertension, hyperlipidemia who was admitted for hematuria, and was managed by nephrology for dialysis, for acute kidney injury on CKD.  Patient was discharged.  Refer to discharge summary from 2024.  Patient had appealed her discharge.  During my evaluation, patient was undergoing hemodialysis.  He denied any new symptoms.  He states he is now ready to be discharged and will leave the hospital.  Per chart review, patient was seen by nephrology, urology.  Reported recommended follow-up in the office after discharge and recommended that patient should be discharged with a Little catheter.    Discharge Instruction:     Primary care physician: Julia Stein MD within 2 weeks  Diet: renal diet   Activity: activity as tolerated  Disposition: Discharged to:   []Home, [x]HHC, [x]SNF, []Acute Rehab, []Hospice   Condition on discharge: Stable    Discharge Medications:        Medication List        CONTINUE taking these medications      acetaminophen 325 MG tablet  Commonly known as: TYLENOL     acetaZOLAMIDE 250 MG tablet  Commonly known as: DIAMOX  Take 1 tablet by mouth daily     allopurinol 300 MG tablet  Commonly known as: ZYLOPRIM  Take 1 tablet by mouth daily     ANTIBACTERIAL ALGINATE W/SILV EX     ascorbic acid 500 MG tablet  Commonly known as: VITAMIN C     aspirin 81 MG EC tablet  Take 1 tablet by mouth daily     atorvastatin 80 MG tablet  Commonly known as: LIPITOR  Take 1 tablet by mouth nightly     brimonidine-timolol 0.2-0.5 % ophthalmic solution  Commonly known as: COMBIGAN

## 2024-06-01 NOTE — PROGRESS NOTES
Pt a/o x4. VSS. All prescriptions, discharge and follow up instructions given to the patient.  The patient verbalizes understanding and denies questions.  All belongings collected and sent with the patient. The patient was discharged off the unit by stretcher and transportation  in stable condition.

## 2024-06-01 NOTE — DIALYSIS
Treatment time: 3.5 hours  Net UF: 1000 ml     Pre weight: 119.6 kg  Post weight:118.6 kg  EDW: TBD kg  Crit Line Used: Yes Ending Profile: B  Refill Present: No    Access used: R TDC    Access function: Well with  ml/min     Medications or blood products given: Retacrit and midodrine     Regular outpatient schedule: MWF     Summary of response to treatment: Patient tolerated treatment well and without any complications. Patient remained stable throughout entire treatment and upon the exiting the dialysis suite via transport.     Report given to Shell John RN and copy of dialysis treatment record placed in chart, to be scanned into EMR.

## 2024-06-01 NOTE — PLAN OF CARE
Problem: Safety - Adult  Goal: Free from fall injury  6/1/2024 1701 by Shell John RN  Outcome: Adequate for Discharge     Problem: Pain  Goal: Verbalizes/displays adequate comfort level or baseline comfort level  6/1/2024 1701 by Shell John RN  Outcome: Adequate for Discharge     Problem: ABCDS Injury Assessment  Goal: Absence of physical injury  6/1/2024 1701 by Shell John RN  Outcome: Adequate for Discharge     Problem: Skin/Tissue Integrity  Goal: Absence of new skin breakdown  Description: 1.  Monitor for areas of redness and/or skin breakdown  2.  Assess vascular access sites hourly  3.  Every 4-6 hours minimum:  Change oxygen saturation probe site  4.  Every 4-6 hours:  If on nasal continuous positive airway pressure, respiratory therapy assess nares and determine need for appliance change or resting period.  6/1/2024 1701 by Shell John RN  Outcome: Adequate for Discharge     Problem: Chronic Conditions and Co-morbidities  Goal: Patient's chronic conditions and co-morbidity symptoms are monitored and maintained or improved  6/1/2024 1701 by Shell John RN  Outcome: Adequate for Discharge

## 2024-06-01 NOTE — CARE COORDINATION
CASE MANAGEMENT DISCHARGE SUMMARY      Discharge to: [facility]    Precertification completed: N/A  Hospital Exemption Notification (HENS) completed: N/A    Transportation:    Family/car:   Medical Transport explained to pt/family. Pt/family voice no agency preference.    Agency used: [facility]   time: 1630   Ambulance form completed: Yes    Confirmed discharge plan with:     Patient: yes     Family:  yes Leoncio Dey      Facility/Agency, name: [facility] faxed   Phone number for report to facility: [phone]     RN, name: Shell MATTA    Note: Discharging nurse to complete ARASH, reconcile AVS, and place final copy with patient's discharge packet. RN to ensure that written prescriptions for  Level II medications are sent with patient to the facility as per protocol.

## 2024-06-01 NOTE — PLAN OF CARE
Problem: Safety - Adult  Goal: Free from fall injury  Outcome: Progressing     Problem: Pain  Goal: Verbalizes/displays adequate comfort level or baseline comfort level  Outcome: Progressing  Flowsheets (Taken 6/1/2024 0345)  Verbalizes/displays adequate comfort level or baseline comfort level:   Assess pain using appropriate pain scale   Encourage patient to monitor pain and request assistance   Administer analgesics based on type and severity of pain and evaluate response   Implement non-pharmacological measures as appropriate and evaluate response     Problem: ABCDS Injury Assessment  Goal: Absence of physical injury  Outcome: Progressing  Flowsheets (Taken 6/1/2024 0345)  Absence of Physical Injury: Implement safety measures based on patient assessment

## 2024-06-01 NOTE — CARE COORDINATION
Per RN pt going to dialysis in the next hour. Pt is agreeable to discharging back to facility today after dialysis.

## 2024-06-01 NOTE — PLAN OF CARE
Problem: Safety - Adult  Goal: Free from fall injury  6/1/2024 1104 by Shell John RN  Outcome: Progressing     Problem: Pain  Goal: Verbalizes/displays adequate comfort level or baseline comfort level  6/1/2024 1104 by Shell John RN  Outcome: Progressing     Problem: ABCDS Injury Assessment  Goal: Absence of physical injury  6/1/2024 1104 by Shell John RN  Outcome: Progressing     Problem: Skin/Tissue Integrity  Goal: Absence of new skin breakdown  Description: 1.  Monitor for areas of redness and/or skin breakdown  2.  Assess vascular access sites hourly  3.  Every 4-6 hours minimum:  Change oxygen saturation probe site  4.  Every 4-6 hours:  If on nasal continuous positive airway pressure, respiratory therapy assess nares and determine need for appliance change or resting period.  6/1/2024 1104 by Shell John RN  Outcome: Progressing

## 2024-06-01 NOTE — PROGRESS NOTES
Pt assessment completed and charted. VSS. Pt a/o. Pt is complaining of 8/10 pain this morning. Pt is ready and waiting for dialysis. Has a pickup time of 4:30pm.         Standard Precautions   Bed in lowest position, wheels locked.   Call light within reach.   Bedside table within reach.   Non-skid socks in place.

## 2024-07-02 ENCOUNTER — HOSPITAL ENCOUNTER (EMERGENCY)
Age: 69
Discharge: HOME | End: 2024-07-02
Payer: MEDICARE

## 2024-07-02 VITALS
OXYGEN SATURATION: 97 % | BODY MASS INDEX: 36.68 KG/M2 | TEMPERATURE: 98.8 F | WEIGHT: 242 LBS | HEIGHT: 68 IN | RESPIRATION RATE: 16 BRPM | HEART RATE: 57 BPM | SYSTOLIC BLOOD PRESSURE: 108 MMHG | DIASTOLIC BLOOD PRESSURE: 55 MMHG

## 2024-07-02 DIAGNOSIS — R25.1 TREMOR: Primary | ICD-10-CM

## 2024-07-02 LAB
ALBUMIN SERPL-MCNC: 4.2 G/DL (ref 3.4–5)
ALBUMIN/GLOB SERPL: 1.3 {RATIO} (ref 1.1–2.2)
ALP SERPL-CCNC: 110 U/L (ref 40–129)
ALT SERPL-CCNC: <5 U/L (ref 10–40)
ANION GAP SERPL CALCULATED.3IONS-SCNC: 9 MMOL/L (ref 3–16)
AST SERPL-CCNC: 10 U/L (ref 15–37)
BASOPHILS # BLD: 0 K/UL (ref 0–0.2)
BASOPHILS NFR BLD: 0.5 %
BILIRUB SERPL-MCNC: 0.8 MG/DL (ref 0–1)
BUN SERPL-MCNC: 22 MG/DL (ref 7–20)
CALCIUM SERPL-MCNC: 8.8 MG/DL (ref 8.3–10.6)
CHLORIDE SERPL-SCNC: 100 MMOL/L (ref 99–110)
CO2 SERPL-SCNC: 26 MMOL/L (ref 21–32)
CREAT SERPL-MCNC: 2.1 MG/DL (ref 0.8–1.3)
DEPRECATED RDW RBC AUTO: 22 % (ref 12.4–15.4)
EOSINOPHIL # BLD: 0.1 K/UL (ref 0–0.6)
EOSINOPHIL NFR BLD: 1.5 %
GFR SERPLBLD CREATININE-BSD FMLA CKD-EPI: 33 ML/MIN/{1.73_M2}
GLUCOSE SERPL-MCNC: 184 MG/DL (ref 70–99)
HCT VFR BLD AUTO: 32.1 % (ref 40.5–52.5)
HGB BLD-MCNC: 10 G/DL (ref 13.5–17.5)
LYMPHOCYTES # BLD: 0.6 K/UL (ref 1–5.1)
LYMPHOCYTES NFR BLD: 7.7 %
MAGNESIUM SERPL-MCNC: 2.1 MG/DL (ref 1.8–2.4)
MCH RBC QN AUTO: 29.1 PG (ref 26–34)
MCHC RBC AUTO-ENTMCNC: 31.3 G/DL (ref 31–36)
MCV RBC AUTO: 92.9 FL (ref 80–100)
MONOCYTES # BLD: 0.5 K/UL (ref 0–1.3)
MONOCYTES NFR BLD: 6.9 %
NEUTROPHILS # BLD: 6.1 K/UL (ref 1.7–7.7)
NEUTROPHILS NFR BLD: 83.4 %
PLATELET # BLD AUTO: 118 K/UL (ref 135–450)
PMV BLD AUTO: 9.2 FL (ref 5–10.5)
POTASSIUM SERPL-SCNC: 4.7 MMOL/L (ref 3.5–5.1)
PROT SERPL-MCNC: 7.5 G/DL (ref 6.4–8.2)
RBC # BLD AUTO: 3.45 M/UL (ref 4.2–5.9)
SODIUM SERPL-SCNC: 135 MMOL/L (ref 136–145)
WBC # BLD AUTO: 7.3 K/UL (ref 4–11)

## 2024-07-02 PROCEDURE — 80053 COMPREHEN METABOLIC PANEL: CPT

## 2024-07-02 PROCEDURE — 83735 ASSAY OF MAGNESIUM: CPT

## 2024-07-02 PROCEDURE — 36415 COLL VENOUS BLD VENIPUNCTURE: CPT

## 2024-07-02 PROCEDURE — 99283 EMERGENCY DEPT VISIT LOW MDM: CPT

## 2024-07-02 PROCEDURE — 85025 COMPLETE CBC W/AUTO DIFF WBC: CPT

## 2024-07-02 NOTE — DISCHARGE INSTRUCTIONS
Labs in the emergency department today are reassuring.  Please follow-up with your primary care physician.

## 2024-07-02 NOTE — ED PROVIDER NOTES
[facility]  ED  EMERGENCY DEPARTMENT ENCOUNTER        Pt Name: Shay Townsend  MRN: [id number]  Birthdate 1955  Date of evaluation: 7/2/2024  Provider: EYAD Hardin  PCP: Julia Stein MD  Note Started: 5:44 PM EDT 7/2/24      JENNIE. I have evaluated this patient.        CHIEF COMPLAINT       Chief Complaint   Patient presents with    Tremors     Pt sent in by [facility] for tremors. Pt states he has tremors for the past 2x weeks. Pt has \"intermittent dementia\".        HISTORY OF PRESENT ILLNESS: 1 or more Elements     History from : Patient    Limitations to history : None    Shay Townsend is a 69 y.o. male who presents to the emergency department via EMS from [facility] for concerns of a tremor.  The patient reports he has had a tremor for the past 2 weeks.  He is a dialysis patient and receives dialysis Tuesday Thursday Saturday.  He did get his full dialysis this morning.  Patient reports that in the mornings he has tremors and they were worse today while at dialysis.  He states he thinks that is a medication reaction, specifically thinks that it may be a reaction to his Lasix.  He denies any chest pain, shortness of breath or cough.  No abdominal pain, nausea or vomiting.  No lightheadedness or dizziness.  Patient is primarily asymptomatic at this time.    Nursing Notes were all reviewed and agreed with or any disagreements were addressed in the HPI.    REVIEW OF SYSTEMS :      Review of Systems    Positives and Pertinent negatives as per HPI.     SURGICAL HISTORY     Past Surgical History:   Procedure Laterality Date    CARDIAC SURGERY      Dec 27 2010, triple bypass    CHOLECYSTECTOMY  9/2011    HERNIA REPAIR  age2    IR TUNNELED CATHETER PLACEMENT GREATER THAN 5 YEARS  5/21/2024    IR TUNNELED CATHETER PLACEMENT GREATER THAN 5 YEARS 5/21/2024 [facility] PROCEDURES    KNEE ARTHROCENTESIS Right 10/6/2022    RIGHT SHOULDER INTRA ARTICULAR INJECTION SITE CONFIRMED BY

## 2024-07-09 ENCOUNTER — OFFICE VISIT (OUTPATIENT)
Dept: CARDIOLOGY CLINIC | Age: 69
End: 2024-07-09
Payer: MEDICARE

## 2024-07-09 VITALS
HEIGHT: 68 IN | HEART RATE: 58 BPM | BODY MASS INDEX: 36.8 KG/M2 | OXYGEN SATURATION: 95 % | SYSTOLIC BLOOD PRESSURE: 90 MMHG | DIASTOLIC BLOOD PRESSURE: 42 MMHG

## 2024-07-09 DIAGNOSIS — I35.0 MODERATE AORTIC STENOSIS: ICD-10-CM

## 2024-07-09 DIAGNOSIS — I50.32 CHRONIC DIASTOLIC HEART FAILURE (HCC): Primary | ICD-10-CM

## 2024-07-09 DIAGNOSIS — I27.81 COR PULMONALE, CHRONIC (HCC): ICD-10-CM

## 2024-07-09 DIAGNOSIS — N18.6 ESRD (END STAGE RENAL DISEASE) (HCC): ICD-10-CM

## 2024-07-09 PROCEDURE — G8417 CALC BMI ABV UP PARAM F/U: HCPCS | Performed by: NURSE PRACTITIONER

## 2024-07-09 PROCEDURE — 1123F ACP DISCUSS/DSCN MKR DOCD: CPT | Performed by: NURSE PRACTITIONER

## 2024-07-09 PROCEDURE — 99214 OFFICE O/P EST MOD 30 MIN: CPT | Performed by: NURSE PRACTITIONER

## 2024-07-09 PROCEDURE — 3078F DIAST BP <80 MM HG: CPT | Performed by: NURSE PRACTITIONER

## 2024-07-09 PROCEDURE — 3074F SYST BP LT 130 MM HG: CPT | Performed by: NURSE PRACTITIONER

## 2024-07-09 PROCEDURE — G8427 DOCREV CUR MEDS BY ELIG CLIN: HCPCS | Performed by: NURSE PRACTITIONER

## 2024-07-09 PROCEDURE — 1036F TOBACCO NON-USER: CPT | Performed by: NURSE PRACTITIONER

## 2024-07-09 PROCEDURE — 3017F COLORECTAL CA SCREEN DOC REV: CPT | Performed by: NURSE PRACTITIONER

## 2024-07-09 PROCEDURE — 93000 ELECTROCARDIOGRAM COMPLETE: CPT | Performed by: NURSE PRACTITIONER

## 2024-07-09 RX ORDER — MIDODRINE HYDROCHLORIDE 10 MG/1
10 TABLET ORAL 2 TIMES DAILY
COMMUNITY
Start: 2024-06-13

## 2024-07-09 RX ORDER — OXYCODONE HYDROCHLORIDE AND ACETAMINOPHEN 5; 325 MG/1; MG/1
1 TABLET ORAL EVERY 4 HOURS PRN
COMMUNITY

## 2024-07-09 NOTE — PATIENT INSTRUCTIONS
Plan:   Continue daily weights  Continue aspirin  Midodrine per nephrology  Volume control per HD  Off of coreg due to hypotension and HR in the 60's  Follow up with cardiology team as needed  Consider discussing palliative care or hospice with patient along with goals of care

## 2024-07-09 NOTE — PROGRESS NOTES
Take 1 tablet by mouth nightly  Patient not taking: Reported on 7/9/2024 3/14/22   Melany Ibrahim, APRN - CNP   allopurinol (ZYLOPRIM) 300 MG tablet Take 1 tablet by mouth daily  Patient not taking: Reported on 7/9/2024 3/15/22   Alvaro Nolasco MD   docusate sodium (COLACE, DULCOLAX) 100 MG CAPS Take 100 mg by mouth 2 times daily  Patient not taking: Reported on 7/9/2024 5/1/18   Ashlie Bass, APRN - CNP   nitroGLYCERIN (NITROSTAT) 0.4 MG SL tablet Place 1 tablet under the tongue every 5 minutes as needed  Patient not taking: Reported on 8/24/2022 8/17/15 9/1/22  Kellie Patino, APRN - CNP   ferrous sulfate 325 (65 FE) MG tablet Take 1 tablet by mouth 2 times daily  Patient not taking: Reported on 7/9/2024 12/22/10   Provider, MD Hermelinda        Allergies:  Amitriptyline, Celecoxib, Cymbalta [duloxetine hcl], Elavil [amitriptyline hcl], Gabapentin, and Nsaids     Physical Examination:    Vitals:    07/09/24 1412   BP: (!) 90/42   Pulse: 51   SpO2: 95%   Height: 1.727 m (5' 7.99\")     Physical exam was reviewed and updated as below at the time of the visit:     Constitutional and General Appearance: no apparent distress, obese  HEENT: non-icteric sclera,  disconjugate gaze  Neck: JVP difficult to assess   Respiratory:  No use of accessory muscles  Diminished breath sounds throughout, no wheezing, no crackles, no rhonchi  Cardiovascular:  + 3/6 high pitched systolic murmur loudest at second right sternal border, no rub or gallop  Regular rate and irregular rhythm, S1,S2 distant  Radial pulses 2+ and equal bilaterally  2+ BLE edema , legs wrapped   Musculoskeletal/Skin:  Wheelchair   There is no clubbing, cyanosis of the extremities  Skin is warm and dry, chronic venous stasis changes   Moves all extremities   Neurological/Psychiatric:  Alert and oriented in all spheres  No abnormalities of mood, affect, mentation, or behavior are noted    Lab Data:  CBC:   Lab Results   Component Value Date/Time

## 2024-07-15 ENCOUNTER — HOSPITAL ENCOUNTER (EMERGENCY)
Age: 69
Discharge: HOME | End: 2024-07-15
Payer: MEDICARE

## 2024-07-15 VITALS
RESPIRATION RATE: 18 BRPM | DIASTOLIC BLOOD PRESSURE: 63 MMHG | TEMPERATURE: 98.5 F | SYSTOLIC BLOOD PRESSURE: 107 MMHG | HEIGHT: 67 IN | OXYGEN SATURATION: 99 % | HEART RATE: 85 BPM | WEIGHT: 245 LBS | BODY MASS INDEX: 38.45 KG/M2

## 2024-07-15 DIAGNOSIS — T83.511A URINARY TRACT INFECTION ASSOCIATED WITH INDWELLING URETHRAL CATHETER, INITIAL ENCOUNTER (HCC): Primary | ICD-10-CM

## 2024-07-15 DIAGNOSIS — N39.0 URINARY TRACT INFECTION ASSOCIATED WITH INDWELLING URETHRAL CATHETER, INITIAL ENCOUNTER (HCC): Primary | ICD-10-CM

## 2024-07-15 LAB
ALBUMIN SERPL-MCNC: 3.7 G/DL (ref 3.4–5)
ALBUMIN/GLOB SERPL: 0.9 {RATIO} (ref 1.1–2.2)
ALP SERPL-CCNC: 118 U/L (ref 40–129)
ALT SERPL-CCNC: <5 U/L (ref 10–40)
ANION GAP SERPL CALCULATED.3IONS-SCNC: 11 MMOL/L (ref 3–16)
AST SERPL-CCNC: 10 U/L (ref 15–37)
BACTERIA URNS QL MICRO: ABNORMAL /HPF
BASOPHILS # BLD: 0 K/UL (ref 0–0.2)
BASOPHILS NFR BLD: 0.5 %
BILIRUB SERPL-MCNC: 0.6 MG/DL (ref 0–1)
BILIRUB UR QL STRIP.AUTO: NEGATIVE
BUN SERPL-MCNC: 34 MG/DL (ref 7–20)
CALCIUM SERPL-MCNC: 9.3 MG/DL (ref 8.3–10.6)
CHLORIDE SERPL-SCNC: 100 MMOL/L (ref 99–110)
CLARITY UR: ABNORMAL
CO2 SERPL-SCNC: 24 MMOL/L (ref 21–32)
COLOR UR: YELLOW
CREAT SERPL-MCNC: 2.5 MG/DL (ref 0.8–1.3)
DEPRECATED RDW RBC AUTO: 19.6 % (ref 12.4–15.4)
EOSINOPHIL # BLD: 0.3 K/UL (ref 0–0.6)
EOSINOPHIL NFR BLD: 3.1 %
EPI CELLS #/AREA URNS HPF: ABNORMAL /HPF (ref 0–5)
GFR SERPLBLD CREATININE-BSD FMLA CKD-EPI: 27 ML/MIN/{1.73_M2}
GLUCOSE SERPL-MCNC: 190 MG/DL (ref 70–99)
GLUCOSE UR STRIP.AUTO-MCNC: NEGATIVE MG/DL
HCT VFR BLD AUTO: 31 % (ref 40.5–52.5)
HGB BLD-MCNC: 9.9 G/DL (ref 13.5–17.5)
HGB UR QL STRIP.AUTO: ABNORMAL
KETONES UR STRIP.AUTO-MCNC: NEGATIVE MG/DL
LEUKOCYTE ESTERASE UR QL STRIP.AUTO: ABNORMAL
LYMPHOCYTES # BLD: 0.5 K/UL (ref 1–5.1)
LYMPHOCYTES NFR BLD: 5.2 %
MCH RBC QN AUTO: 28.7 PG (ref 26–34)
MCHC RBC AUTO-ENTMCNC: 31.9 G/DL (ref 31–36)
MCV RBC AUTO: 89.7 FL (ref 80–100)
MONOCYTES # BLD: 0.5 K/UL (ref 0–1.3)
MONOCYTES NFR BLD: 5.2 %
NEUTROPHILS # BLD: 8.3 K/UL (ref 1.7–7.7)
NEUTROPHILS NFR BLD: 86 %
NITRITE UR QL STRIP.AUTO: NEGATIVE
PH UR STRIP.AUTO: 6 [PH] (ref 5–8)
PLATELET # BLD AUTO: 172 K/UL (ref 135–450)
PMV BLD AUTO: 8.9 FL (ref 5–10.5)
POTASSIUM SERPL-SCNC: 3.9 MMOL/L (ref 3.5–5.1)
PROT SERPL-MCNC: 7.8 G/DL (ref 6.4–8.2)
PROT UR STRIP.AUTO-MCNC: NEGATIVE MG/DL
RBC # BLD AUTO: 3.46 M/UL (ref 4.2–5.9)
RBC #/AREA URNS HPF: ABNORMAL /HPF (ref 0–4)
SODIUM SERPL-SCNC: 135 MMOL/L (ref 136–145)
SP GR UR STRIP.AUTO: 1.01 (ref 1–1.03)
UA COMPLETE W REFLEX CULTURE PNL UR: YES
UA DIPSTICK W REFLEX MICRO PNL UR: YES
URN SPEC COLLECT METH UR: ABNORMAL
UROBILINOGEN UR STRIP-ACNC: 0.2 E.U./DL
WBC # BLD AUTO: 9.6 K/UL (ref 4–11)
WBC #/AREA URNS HPF: >100 /HPF (ref 0–5)

## 2024-07-15 PROCEDURE — 87184 SC STD DISK METHOD PER PLATE: CPT

## 2024-07-15 PROCEDURE — 2580000003 HC RX 258: Performed by: PHYSICIAN ASSISTANT

## 2024-07-15 PROCEDURE — 87077 CULTURE AEROBIC IDENTIFY: CPT

## 2024-07-15 PROCEDURE — 6360000002 HC RX W HCPCS: Performed by: PHYSICIAN ASSISTANT

## 2024-07-15 PROCEDURE — 80053 COMPREHEN METABOLIC PANEL: CPT

## 2024-07-15 PROCEDURE — 96365 THER/PROPH/DIAG IV INF INIT: CPT

## 2024-07-15 PROCEDURE — 87186 SC STD MICRODIL/AGAR DIL: CPT

## 2024-07-15 PROCEDURE — 99284 EMERGENCY DEPT VISIT MOD MDM: CPT

## 2024-07-15 PROCEDURE — 81001 URINALYSIS AUTO W/SCOPE: CPT

## 2024-07-15 PROCEDURE — 6370000000 HC RX 637 (ALT 250 FOR IP): Performed by: PHYSICIAN ASSISTANT

## 2024-07-15 PROCEDURE — 87086 URINE CULTURE/COLONY COUNT: CPT

## 2024-07-15 PROCEDURE — 85025 COMPLETE CBC W/AUTO DIFF WBC: CPT

## 2024-07-15 RX ORDER — ONDANSETRON 4 MG/1
4 TABLET, ORALLY DISINTEGRATING ORAL ONCE
Status: COMPLETED | OUTPATIENT
Start: 2024-07-15 | End: 2024-07-15

## 2024-07-15 RX ORDER — CEPHALEXIN 500 MG/1
500 CAPSULE ORAL 4 TIMES DAILY
Qty: 28 CAPSULE | Refills: 0 | Status: SHIPPED | OUTPATIENT
Start: 2024-07-15 | End: 2024-07-22

## 2024-07-15 RX ORDER — ACETAMINOPHEN 500 MG
500 TABLET ORAL ONCE
Status: COMPLETED | OUTPATIENT
Start: 2024-07-15 | End: 2024-07-15

## 2024-07-15 RX ADMIN — CEFTRIAXONE SODIUM 1000 MG: 1 INJECTION, POWDER, FOR SOLUTION INTRAMUSCULAR; INTRAVENOUS at 14:01

## 2024-07-15 RX ADMIN — ACETAMINOPHEN 500 MG: 500 TABLET ORAL at 12:23

## 2024-07-15 RX ADMIN — ONDANSETRON 4 MG: 4 TABLET, ORALLY DISINTEGRATING ORAL at 14:57

## 2024-07-15 ASSESSMENT — ENCOUNTER SYMPTOMS
SHORTNESS OF BREATH: 0
COLOR CHANGE: 0
EYE DISCHARGE: 0
DIARRHEA: 0
SINUS PAIN: 0
VOMITING: 0
EYE REDNESS: 0
SINUS PRESSURE: 0
RHINORRHEA: 0
NAUSEA: 0
CONSTIPATION: 0
ABDOMINAL PAIN: 0
SORE THROAT: 0
CHEST TIGHTNESS: 0
COUGH: 0

## 2024-07-15 ASSESSMENT — PAIN DESCRIPTION - LOCATION: LOCATION: GENERALIZED

## 2024-07-15 ASSESSMENT — PAIN DESCRIPTION - PAIN TYPE: TYPE: ACUTE PAIN

## 2024-07-15 ASSESSMENT — PAIN DESCRIPTION - FREQUENCY: FREQUENCY: CONTINUOUS

## 2024-07-15 ASSESSMENT — PAIN SCALES - GENERAL
PAINLEVEL_OUTOF10: 10
PAINLEVEL_OUTOF10: 0

## 2024-07-15 ASSESSMENT — PAIN DESCRIPTION - DESCRIPTORS: DESCRIPTORS: ACHING

## 2024-07-15 ASSESSMENT — PAIN - FUNCTIONAL ASSESSMENT
PAIN_FUNCTIONAL_ASSESSMENT: 0-10
PAIN_FUNCTIONAL_ASSESSMENT: 0-10

## 2024-07-15 ASSESSMENT — PAIN DESCRIPTION - ONSET: ONSET: GRADUAL

## 2024-07-15 NOTE — ED PROVIDER NOTES
[facility]  ED  EMERGENCY DEPARTMENT ENCOUNTER        Pt Name: Shay Townsend  MRN: [id number]  Birthdate 1955  Date of evaluation: 7/15/2024  Provider: Radha Kyle PA-C  PCP: Julia Stein MD  Note Started: 12:08 PM EDT 7/15/24      JENNIE. I have evaluated this patient.        CHIEF COMPLAINT       Chief Complaint   Patient presents with    Urinary Catheter     Pt brought in from [facility] for garcia cath issues. Pt states it hurts all the time. Garcia draining, bladder scan shows 19ML       HISTORY OF PRESENT ILLNESS: 1 or more Elements     History from : Patient    Limitations to history : None    Shay Townsend is a 69 y.o. male with a past medical history of CHF, CKD, anemia, dialysis patient, HTN, HLD, CAD, HENNA, DM 2, morbid obesity who presents to the ER sent in from skilled nursing facility where patient resides for evaluation of pain in his urinary catheter.  He denies having any trauma to the area.  Patient advised that the catheter hurts all the time and has hurt since it was placed., aching pain, continuous pain in his penis.    Nursing Notes were all reviewed and agreed with or any disagreements were addressed in the HPI.    REVIEW OF SYSTEMS :      Review of Systems   Constitutional:  Negative for chills, fatigue and fever.   HENT: Negative.  Negative for congestion, rhinorrhea, sinus pressure, sinus pain and sore throat.    Eyes:  Negative for discharge, redness and visual disturbance.   Respiratory:  Negative for cough, chest tightness and shortness of breath.    Cardiovascular:  Negative for chest pain and palpitations.   Gastrointestinal:  Negative for abdominal pain, constipation, diarrhea, nausea and vomiting.   Genitourinary:  Positive for dysuria. Negative for difficulty urinating and frequency.   Musculoskeletal: Negative.    Skin: Negative.  Negative for color change and rash.   Neurological: Negative.  Negative for dizziness, weakness, numbness and headaches.

## 2024-07-15 NOTE — DISCHARGE INSTR - COC
lumbar region M48.061    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A32    Recurrent acute suppurative otitis media with spontaneous rupture of left tympanic membrane H66.015    Chest pain R07.9    Cor pulmonale, chronic ([facility]) I27.81    Pulmonary hypertension due to alveolar hypoventilation disorder ([facility]) I27.23    Moderate aortic stenosis I35.0    Chronic neck pain M54.2, G89.29    Dyspnea R06.00    Acute diastolic CHF (congestive heart failure) ([facility]) I50.31    Cervical stenosis of spinal canal M48.02    Degeneration of cervical intervertebral disc M50.30    Cervical radiculitis M54.12    Acute on chronic diastolic CHF (congestive heart failure) ([facility]) I50.33    Acute respiratory failure with hypoxia ([facility]) J96.01    Acute on chronic renal insufficiency N28.9, N18.9    PAF (paroxysmal atrial fibrillation) ([facility]) I48.0    Acute diastolic HF (heart failure) ([facility]) I50.31    Subdural hematoma ([facility]) S06.5XAA    SDH (subdural hematoma) ([facility]) S06.5XAA    Acute on chronic respiratory failure with hypoxemia ([facility]) J96.21    Fluid overload E87.70    MARGARITO (acute kidney injury) ([facility]) N17.9    Epistaxis R04.0    Acute heart failure, unspecified heart failure type ([facility]) I50.9    Weight gain R63.5    Acute on chronic congestive heart failure ([facility]) I50.9    Acute on chronic diastolic (congestive) heart failure ([facility]) I50.33    Anemia due to stage 3b chronic kidney disease ([facility]) N18.32, D63.1    Azotemia R79.89    Hyperglycemia due to type 2 diabetes mellitus ([facility]) E11.65    Hyponatremia E87.1    CHF NYHA class I, acute, diastolic ([facility]) I50.31    Heart failure ([facility]) I50.9    Neuropathy G62.9    Hematuria R31.9       Isolation/Infection:   Isolation            No Isolation          Patient Infection Status       None to display                     Nurse Assessment:  Last Vital Signs: There were no vitals taken for this visit.    Last documented pain score (0-10 scale):    Last

## 2024-07-18 LAB
BACTERIA UR CULT: ABNORMAL
BACTERIA UR CULT: ABNORMAL
ORGANISM: ABNORMAL
ORGANISM: ABNORMAL

## 2024-07-24 ENCOUNTER — HOSPITAL ENCOUNTER (EMERGENCY)
Age: 69
Discharge: HOME | End: 2024-07-24
Attending: EMERGENCY MEDICINE
Payer: MEDICARE

## 2024-07-24 ENCOUNTER — APPOINTMENT (OUTPATIENT)
Dept: CT IMAGING | Age: 69
End: 2024-07-24
Payer: MEDICARE

## 2024-07-24 VITALS
HEART RATE: 84 BPM | WEIGHT: 245 LBS | OXYGEN SATURATION: 98 % | RESPIRATION RATE: 20 BRPM | SYSTOLIC BLOOD PRESSURE: 117 MMHG | DIASTOLIC BLOOD PRESSURE: 60 MMHG | BODY MASS INDEX: 38.45 KG/M2 | TEMPERATURE: 97.6 F | HEIGHT: 67 IN

## 2024-07-24 DIAGNOSIS — N39.0 URINARY TRACT INFECTION WITHOUT HEMATURIA, SITE UNSPECIFIED: ICD-10-CM

## 2024-07-24 DIAGNOSIS — R10.10 PAIN OF UPPER ABDOMEN: Primary | ICD-10-CM

## 2024-07-24 DIAGNOSIS — R11.0 NAUSEA: ICD-10-CM

## 2024-07-24 LAB
ALBUMIN SERPL-MCNC: 3.8 G/DL (ref 3.4–5)
ALBUMIN/GLOB SERPL: 1 {RATIO} (ref 1.1–2.2)
ALP SERPL-CCNC: 127 U/L (ref 40–129)
ALT SERPL-CCNC: 7 U/L (ref 10–40)
ANION GAP SERPL CALCULATED.3IONS-SCNC: 11 MMOL/L (ref 3–16)
AST SERPL-CCNC: 16 U/L (ref 15–37)
BACTERIA URNS QL MICRO: ABNORMAL /HPF
BASOPHILS # BLD: 0 K/UL (ref 0–0.2)
BASOPHILS NFR BLD: 0.6 %
BILIRUB SERPL-MCNC: 0.6 MG/DL (ref 0–1)
BILIRUB UR QL STRIP.AUTO: NEGATIVE
BUN SERPL-MCNC: 24 MG/DL (ref 7–20)
CALCIUM SERPL-MCNC: 9.3 MG/DL (ref 8.3–10.6)
CHLORIDE SERPL-SCNC: 99 MMOL/L (ref 99–110)
CLARITY UR: CLEAR
CO2 SERPL-SCNC: 26 MMOL/L (ref 21–32)
COLOR UR: YELLOW
CREAT SERPL-MCNC: 2 MG/DL (ref 0.8–1.3)
DEPRECATED RDW RBC AUTO: 19.7 % (ref 12.4–15.4)
EOSINOPHIL # BLD: 0.2 K/UL (ref 0–0.6)
EOSINOPHIL NFR BLD: 3.5 %
EPI CELLS #/AREA URNS HPF: ABNORMAL /HPF (ref 0–5)
GFR SERPLBLD CREATININE-BSD FMLA CKD-EPI: 35 ML/MIN/{1.73_M2}
GLUCOSE SERPL-MCNC: 177 MG/DL (ref 70–99)
GLUCOSE UR STRIP.AUTO-MCNC: NEGATIVE MG/DL
HCT VFR BLD AUTO: 33.2 % (ref 40.5–52.5)
HGB BLD-MCNC: 10.7 G/DL (ref 13.5–17.5)
HGB UR QL STRIP.AUTO: ABNORMAL
HYALINE CASTS #/AREA URNS LPF: ABNORMAL /LPF (ref 0–2)
KETONES UR STRIP.AUTO-MCNC: NEGATIVE MG/DL
LEUKOCYTE ESTERASE UR QL STRIP.AUTO: ABNORMAL
LIPASE SERPL-CCNC: 37 U/L (ref 13–60)
LYMPHOCYTES # BLD: 0.7 K/UL (ref 1–5.1)
LYMPHOCYTES NFR BLD: 10.7 %
MCH RBC QN AUTO: 28.3 PG (ref 26–34)
MCHC RBC AUTO-ENTMCNC: 32.1 G/DL (ref 31–36)
MCV RBC AUTO: 88.3 FL (ref 80–100)
MONOCYTES # BLD: 0.5 K/UL (ref 0–1.3)
MONOCYTES NFR BLD: 7.4 %
MUCOUS THREADS #/AREA URNS LPF: ABNORMAL /LPF
NEUTROPHILS # BLD: 5.2 K/UL (ref 1.7–7.7)
NEUTROPHILS NFR BLD: 77.8 %
NITRITE UR QL STRIP.AUTO: NEGATIVE
PH UR STRIP.AUTO: 6.5 [PH] (ref 5–8)
PLATELET # BLD AUTO: 193 K/UL (ref 135–450)
PMV BLD AUTO: 8.3 FL (ref 5–10.5)
POTASSIUM SERPL-SCNC: 3.8 MMOL/L (ref 3.5–5.1)
PROT SERPL-MCNC: 7.7 G/DL (ref 6.4–8.2)
PROT UR STRIP.AUTO-MCNC: NEGATIVE MG/DL
RBC # BLD AUTO: 3.76 M/UL (ref 4.2–5.9)
RBC #/AREA URNS HPF: ABNORMAL /HPF (ref 0–4)
RENAL EPI CELLS #/AREA UR COMP ASSIST: ABNORMAL /HPF (ref 0–1)
SODIUM SERPL-SCNC: 136 MMOL/L (ref 136–145)
SP GR UR STRIP.AUTO: 1.01 (ref 1–1.03)
UA COMPLETE W REFLEX CULTURE PNL UR: YES
UA DIPSTICK W REFLEX MICRO PNL UR: YES
URN SPEC COLLECT METH UR: ABNORMAL
UROBILINOGEN UR STRIP-ACNC: 0.2 E.U./DL
WBC # BLD AUTO: 6.7 K/UL (ref 4–11)
WBC #/AREA URNS HPF: >100 /HPF (ref 0–5)

## 2024-07-24 PROCEDURE — 6370000000 HC RX 637 (ALT 250 FOR IP): Performed by: PHYSICIAN ASSISTANT

## 2024-07-24 PROCEDURE — 99284 EMERGENCY DEPT VISIT MOD MDM: CPT

## 2024-07-24 PROCEDURE — 74176 CT ABD & PELVIS W/O CONTRAST: CPT

## 2024-07-24 PROCEDURE — 87086 URINE CULTURE/COLONY COUNT: CPT

## 2024-07-24 PROCEDURE — 85025 COMPLETE CBC W/AUTO DIFF WBC: CPT

## 2024-07-24 PROCEDURE — 51701 INSERT BLADDER CATHETER: CPT

## 2024-07-24 PROCEDURE — 81001 URINALYSIS AUTO W/SCOPE: CPT

## 2024-07-24 PROCEDURE — 36415 COLL VENOUS BLD VENIPUNCTURE: CPT

## 2024-07-24 PROCEDURE — 6360000002 HC RX W HCPCS: Performed by: PHYSICIAN ASSISTANT

## 2024-07-24 PROCEDURE — 96374 THER/PROPH/DIAG INJ IV PUSH: CPT

## 2024-07-24 PROCEDURE — 80053 COMPREHEN METABOLIC PANEL: CPT

## 2024-07-24 PROCEDURE — 83690 ASSAY OF LIPASE: CPT

## 2024-07-24 RX ORDER — ONDANSETRON 2 MG/ML
4 INJECTION INTRAMUSCULAR; INTRAVENOUS ONCE
Status: COMPLETED | OUTPATIENT
Start: 2024-07-24 | End: 2024-07-24

## 2024-07-24 RX ORDER — CEFDINIR 300 MG/1
300 CAPSULE ORAL ONCE
Status: COMPLETED | OUTPATIENT
Start: 2024-07-24 | End: 2024-07-24

## 2024-07-24 RX ORDER — CEFDINIR 300 MG/1
300 CAPSULE ORAL 2 TIMES DAILY
Qty: 14 CAPSULE | Refills: 0 | Status: SHIPPED | OUTPATIENT
Start: 2024-07-24 | End: 2024-07-31

## 2024-07-24 RX ADMIN — CEFDINIR 300 MG: 300 CAPSULE ORAL at 18:30

## 2024-07-24 RX ADMIN — ONDANSETRON 4 MG: 2 INJECTION INTRAMUSCULAR; INTRAVENOUS at 17:13

## 2024-07-24 NOTE — ED PROVIDER NOTES
[facility]  ED     EMERGENCY DEPARTMENT ENCOUNTER     Location: [facility]  ED  7/24/2024  Note Started: 5:25 PM EDT 7/24/24      Patient Identification  Shay Townsend is a 69 y.o. male      HPI:Shay Townsend was evaluated in the Emergency Department for epigastric pain and constipation.  Patient is on a care plan for chronic abdominal pain.  [facility]  He reports burning in the epigastric region and was reportedly prescribed Protonix which he has not yet initiated.  Patient also reports concern for possible constipation.. Although initial history and physical exam information was obtained by JENNIE/NPP/MD/ (who also dictated a record of this visit), I personally saw the patient and performed and made/approved the management plan and take responsibility for the patient management.      PHYSICAL EXAM:  General: No acute distress.  Head: Normocephalic/atraumatic.  Heart: Regular rate and rhythm.  Normal S1-S2.  Lungs: Clear to auscultation bilaterally.  No wheezing, rales, rhonchi.  Abdo: Soft.  Epigastric tenderness to palpation.  No rebound, or guarding.    Patient seen and evaluated.  Relevant records reviewed.  MDM    Patient presents to the emergency department with acute on chronic abdominal discomfort.  Today, pain is predominantly in the epigastric region.  Afebrile with stable vitals on arrival.    I independently interpreted the following studies:     CKD.  Stable.  Electrolytes within normal limits.  Normal white count and stable hemoglobin.    CLINICAL IMPRESSION  1. Pain of upper abdomen    2. Nausea    3. Urinary tract infection without hematuria, site unspecified          IDarius II, DO, am the primary clinician of record.   I personally saw the patient and independently provided 0 minutes of non-concurrent critical care out of the total shared critical care time excluding separately billable procedures.    This chart was 
admit): Patient here with acute on chronic abdominal pain.  Ultimately lab workup is normal.  His UA shows persistent infection and he will be placed on a third-generation cephalosporin.  CT imaging shows nothing acute.  At this point the patient can be safely discharged back to his facility.  He will be prescribed cefdinir.  He should start Protonix as was planned.  Employed shared decision making.     FINAL IMPRESSION:      1. Pain of upper abdomen    2. Nausea    3. Urinary tract infection without hematuria, site unspecified          DISPOSITION/PLAN:   DISPOSITION Discharge - Pending Orders Complete      PATIENT REFERRED TO:  Julia Stein MD  [address] [place] [id number] [phone]    Schedule an appointment as soon as possible for a visit         DISCHARGE MEDICATIONS:  New Prescriptions    CEFDINIR (OMNICEF) 300 MG CAPSULE    Take 1 capsule by mouth 2 times daily for 7 days                  (Please note thatportions of this note were completed with a voice recognition program.  Efforts were made to edit the dictations, but occasionally words are mis-transcribed.)    RAJESH KOVACSC (electronicallysigned)             Shilo Renee PA  07/24/24 2489

## 2024-07-25 LAB — BACTERIA UR CULT: NORMAL

## 2024-07-28 ENCOUNTER — HOSPITAL ENCOUNTER (EMERGENCY)
Age: 69
Discharge: OTHER INSTITUTION | End: 2024-07-28
Attending: EMERGENCY MEDICINE
Payer: MEDICARE

## 2024-07-28 VITALS
SYSTOLIC BLOOD PRESSURE: 96 MMHG | DIASTOLIC BLOOD PRESSURE: 57 MMHG | BODY MASS INDEX: 38.45 KG/M2 | TEMPERATURE: 98.1 F | HEIGHT: 67 IN | RESPIRATION RATE: 18 BRPM | HEART RATE: 73 BPM | OXYGEN SATURATION: 98 % | WEIGHT: 245 LBS

## 2024-07-28 DIAGNOSIS — F41.9 ANXIETY: Primary | ICD-10-CM

## 2024-07-28 LAB
ALBUMIN SERPL-MCNC: 4.3 G/DL (ref 3.4–5)
ALBUMIN/GLOB SERPL: 1.1 {RATIO} (ref 1.1–2.2)
ALP SERPL-CCNC: 134 U/L (ref 40–129)
ALT SERPL-CCNC: 9 U/L (ref 10–40)
ANION GAP SERPL CALCULATED.3IONS-SCNC: 10 MMOL/L (ref 3–16)
AST SERPL-CCNC: 17 U/L (ref 15–37)
BASOPHILS # BLD: 0 K/UL (ref 0–0.2)
BASOPHILS NFR BLD: 0.8 %
BILIRUB SERPL-MCNC: 0.7 MG/DL (ref 0–1)
BUN SERPL-MCNC: 26 MG/DL (ref 7–20)
CALCIUM SERPL-MCNC: 9.3 MG/DL (ref 8.3–10.6)
CHLORIDE SERPL-SCNC: 99 MMOL/L (ref 99–110)
CO2 SERPL-SCNC: 27 MMOL/L (ref 21–32)
CREAT SERPL-MCNC: 1.8 MG/DL (ref 0.8–1.3)
DEPRECATED RDW RBC AUTO: 20.3 % (ref 12.4–15.4)
EOSINOPHIL # BLD: 0.2 K/UL (ref 0–0.6)
EOSINOPHIL NFR BLD: 3.9 %
GFR SERPLBLD CREATININE-BSD FMLA CKD-EPI: 40 ML/MIN/{1.73_M2}
GLUCOSE SERPL-MCNC: 255 MG/DL (ref 70–99)
HCT VFR BLD AUTO: 35.3 % (ref 40.5–52.5)
HGB BLD-MCNC: 11 G/DL (ref 13.5–17.5)
LYMPHOCYTES # BLD: 0.8 K/UL (ref 1–5.1)
LYMPHOCYTES NFR BLD: 12.8 %
MCH RBC QN AUTO: 27.5 PG (ref 26–34)
MCHC RBC AUTO-ENTMCNC: 31.3 G/DL (ref 31–36)
MCV RBC AUTO: 88 FL (ref 80–100)
MONOCYTES # BLD: 0.4 K/UL (ref 0–1.3)
MONOCYTES NFR BLD: 6.5 %
NEUTROPHILS # BLD: 4.9 K/UL (ref 1.7–7.7)
NEUTROPHILS NFR BLD: 76 %
PLATELET # BLD AUTO: 175 K/UL (ref 135–450)
PMV BLD AUTO: 8.1 FL (ref 5–10.5)
POTASSIUM SERPL-SCNC: 4.2 MMOL/L (ref 3.5–5.1)
PROT SERPL-MCNC: 8.1 G/DL (ref 6.4–8.2)
RBC # BLD AUTO: 4.01 M/UL (ref 4.2–5.9)
SODIUM SERPL-SCNC: 136 MMOL/L (ref 136–145)
WBC # BLD AUTO: 6.4 K/UL (ref 4–11)

## 2024-07-28 PROCEDURE — 6370000000 HC RX 637 (ALT 250 FOR IP): Performed by: EMERGENCY MEDICINE

## 2024-07-28 PROCEDURE — 99284 EMERGENCY DEPT VISIT MOD MDM: CPT

## 2024-07-28 PROCEDURE — 80053 COMPREHEN METABOLIC PANEL: CPT

## 2024-07-28 PROCEDURE — 85025 COMPLETE CBC W/AUTO DIFF WBC: CPT

## 2024-07-28 RX ORDER — LORAZEPAM 0.5 MG/1
0.5 TABLET ORAL ONCE
Status: COMPLETED | OUTPATIENT
Start: 2024-07-28 | End: 2024-07-28

## 2024-07-28 RX ADMIN — LORAZEPAM 0.5 MG: 0.5 TABLET ORAL at 00:27

## 2024-07-28 ASSESSMENT — PAIN SCALES - GENERAL: PAINLEVEL_OUTOF10: 9

## 2024-07-28 ASSESSMENT — PAIN DESCRIPTION - LOCATION: LOCATION: FOOT

## 2024-07-28 ASSESSMENT — PAIN - FUNCTIONAL ASSESSMENT: PAIN_FUNCTIONAL_ASSESSMENT: 0-10

## 2024-07-28 NOTE — DISCHARGE INSTRUCTIONS
You were seen for anxiety and difficulty with sleep.  Your exam was reassuring.  Your labs were unremarkable.    Rest.  Use Benadryl and melatonin for sleep.  Follow-up with primary care and mental health.  Return with any new concerns

## 2024-07-28 NOTE — ED PROVIDER NOTES
Given - by NOLVIA SEGURA on 07/28/24 at 0027 ALEXANDER ROBERTSON            Radiology  No results found.    Bedside Ultrasound  No results found.     Labs  Results for orders placed or performed during the hospital encounter of 07/28/24   CBC with Auto Differential   Result Value Ref Range    WBC 6.4 4.0 - 11.0 K/uL    RBC 4.01 (L) 4.20 - 5.90 M/uL    Hemoglobin 11.0 (L) 13.5 - 17.5 g/dL    Hematocrit 35.3 (L) 40.5 - 52.5 %    MCV 88.0 80.0 - 100.0 fL    MCH 27.5 26.0 - 34.0 pg    MCHC 31.3 31.0 - 36.0 g/dL    RDW 20.3 (H) 12.4 - 15.4 %    Platelets 175 135 - 450 K/uL    MPV 8.1 5.0 - 10.5 fL    Neutrophils % 76.0 %    Lymphocytes % 12.8 %    Monocytes % 6.5 %    Eosinophils % 3.9 %    Basophils % 0.8 %    Neutrophils Absolute 4.9 1.7 - 7.7 K/uL    Lymphocytes Absolute 0.8 (L) 1.0 - 5.1 K/uL    Monocytes Absolute 0.4 0.0 - 1.3 K/uL    Eosinophils Absolute 0.2 0.0 - 0.6 K/uL    Basophils Absolute 0.0 0.0 - 0.2 K/uL   CMP w/ Reflex to MG   Result Value Ref Range    Sodium 136 136 - 145 mmol/L    Potassium reflex Magnesium 4.2 3.5 - 5.1 mmol/L    Chloride 99 99 - 110 mmol/L    CO2 27 21 - 32 mmol/L    Anion Gap 10 3 - 16    Glucose 255 (H) 70 - 99 mg/dL    BUN 26 (H) 7 - 20 mg/dL    Creatinine 1.8 (H) 0.8 - 1.3 mg/dL    Est, Glom Filt Rate 40 (A) >60    Calcium 9.3 8.3 - 10.6 mg/dL    Total Protein 8.1 6.4 - 8.2 g/dL    Albumin 4.3 3.4 - 5.0 g/dL    Albumin/Globulin Ratio 1.1 1.1 - 2.2    Total Bilirubin 0.7 0.0 - 1.0 mg/dL    Alkaline Phosphatase 134 (H) 40 - 129 U/L    ALT 9 (L) 10 - 40 U/L    AST 17 15 - 37 U/L       Procedures  Procedures    Diagnostic studies reviewed.  Patient's labs are unrevealing.  He has chronic kidney disease    Upon reassessment, Shay Townsend patient has been sleeping comfortably.  Anxiety is controlled.  He feels comfortable going back to his nursing facility, and following up with his primary care doctor for further evaluation of his chronic anxiety and other chronic medical

## 2024-08-09 ENCOUNTER — OFFICE VISIT (OUTPATIENT)
Dept: VASCULAR SURGERY | Age: 69
End: 2024-08-09

## 2024-08-09 VITALS
SYSTOLIC BLOOD PRESSURE: 110 MMHG | DIASTOLIC BLOOD PRESSURE: 70 MMHG | WEIGHT: 245 LBS | BODY MASS INDEX: 38.45 KG/M2 | HEIGHT: 67 IN

## 2024-08-09 DIAGNOSIS — N18.6 ENCOUNTER REGARDING VASCULAR ACCESS FOR DIALYSIS FOR ESRD (HCC): Primary | ICD-10-CM

## 2024-08-09 DIAGNOSIS — Z99.2 ENCOUNTER REGARDING VASCULAR ACCESS FOR DIALYSIS FOR ESRD (HCC): Primary | ICD-10-CM

## 2024-08-09 NOTE — PROGRESS NOTES
Recommendations/Plan:  Right forearm radial cephalic AV fistula creation.     I discussed the following:  The intended benefits: a working arteriovenous fistula (AVF) is one of the best ways to allow effective haemodialysis needed to replace failed kidney function to sustain life. Many types on arms and sometimes legs can be used; the best for you will be discussed with you.  Alternative treatments: peritoneal dialysis-uses a plastic tube inserted into the abdomen through which fluid is run in and out.   Haemodialysis alternative - can be done through a plastic tube (line) into a major vein but is usually temporary because blockage and infections are common. It can also be done using a graft (a plastic tube embedded beneath the skin and connecting an artery and vein) but these, too are susceptible to infection and blockage.  Serious or frequently occurring risks: none of these methods is durable and some fail immediately or within a year. We can sometimes get them going again by re-operating. Overall, around five to seven in ten are still working after two years and half after five years. The chances of success vary with the site of the AVF or graft and so do the complications.  Up to one in ten patients experience a problem with wound healing which may require further surgery or prolonged dressings. Bleeding from the wound is rare because we take great care to avoid it but it may require a return to the operating theatre. Around one in ten patients develop a wound infection and need antibiotics.  Sometimes too much blood flows through the fistula and not enough into the limb downstream, most commonly affecting the hand. This we call steal and it may require surgery to block the fistula despite which fingers are occasionally lost due to the poor blood supply. Over time some fistulas continue to enlarge (aneurysm) and may clot or bleed.  Rarely nerves lying near the blood vessels are injured, causing numbness

## 2024-08-12 ENCOUNTER — PREP FOR PROCEDURE (OUTPATIENT)
Dept: VASCULAR SURGERY | Age: 69
End: 2024-08-12

## 2024-08-12 DIAGNOSIS — Z01.818 PREOP TESTING: Primary | ICD-10-CM

## 2024-08-12 DIAGNOSIS — N18.6 END STAGE RENAL DISEASE (HCC): ICD-10-CM

## 2024-08-12 RX ORDER — SODIUM CHLORIDE 0.9 % (FLUSH) 0.9 %
5-40 SYRINGE (ML) INJECTION PRN
Status: CANCELLED | OUTPATIENT
Start: 2024-08-12

## 2024-08-12 RX ORDER — SODIUM CHLORIDE 9 MG/ML
INJECTION, SOLUTION INTRAVENOUS PRN
Status: CANCELLED | OUTPATIENT
Start: 2024-08-12

## 2024-08-12 RX ORDER — SODIUM CHLORIDE 0.9 % (FLUSH) 0.9 %
5-40 SYRINGE (ML) INJECTION EVERY 12 HOURS SCHEDULED
Status: CANCELLED | OUTPATIENT
Start: 2024-08-12

## 2024-08-16 ENCOUNTER — TELEPHONE (OUTPATIENT)
Dept: VASCULAR SURGERY | Age: 69
End: 2024-08-16

## 2024-08-16 NOTE — TELEPHONE ENCOUNTER
Called [facility] her[facility] and spoke with nurse as reminder of surgery for Monday August 19, 2024 at 10:00am and arrive at 8:00am. Sherly

## 2024-08-19 ENCOUNTER — TELEPHONE (OUTPATIENT)
Dept: VASCULAR SURGERY | Age: 69
End: 2024-08-19

## 2024-08-19 NOTE — TELEPHONE ENCOUNTER
Silvano Sanchez called and cancelled patient surgery for today. He wishes to not go ahead with this for now. Sherly

## 2024-09-27 ENCOUNTER — OFFICE VISIT (OUTPATIENT)
Dept: ORTHOPEDIC SURGERY | Age: 69
End: 2024-09-27
Payer: MEDICARE

## 2024-09-27 VITALS — HEIGHT: 67 IN | WEIGHT: 245 LBS | BODY MASS INDEX: 38.45 KG/M2

## 2024-09-27 DIAGNOSIS — M25.562 LEFT KNEE PAIN, UNSPECIFIED CHRONICITY: ICD-10-CM

## 2024-09-27 DIAGNOSIS — M54.2 CERVICALGIA: ICD-10-CM

## 2024-09-27 DIAGNOSIS — M17.11 PRIMARY OSTEOARTHRITIS OF RIGHT KNEE: Primary | ICD-10-CM

## 2024-09-27 DIAGNOSIS — M25.561 RIGHT KNEE PAIN, UNSPECIFIED CHRONICITY: ICD-10-CM

## 2024-09-27 DIAGNOSIS — M17.12 PRIMARY OSTEOARTHRITIS OF LEFT KNEE: ICD-10-CM

## 2024-09-27 PROCEDURE — G8417 CALC BMI ABV UP PARAM F/U: HCPCS | Performed by: ORTHOPAEDIC SURGERY

## 2024-09-27 PROCEDURE — 1123F ACP DISCUSS/DSCN MKR DOCD: CPT | Performed by: ORTHOPAEDIC SURGERY

## 2024-09-27 PROCEDURE — 3017F COLORECTAL CA SCREEN DOC REV: CPT | Performed by: ORTHOPAEDIC SURGERY

## 2024-09-27 PROCEDURE — 99204 OFFICE O/P NEW MOD 45 MIN: CPT | Performed by: ORTHOPAEDIC SURGERY

## 2024-09-27 PROCEDURE — 1036F TOBACCO NON-USER: CPT | Performed by: ORTHOPAEDIC SURGERY

## 2024-09-27 PROCEDURE — G8427 DOCREV CUR MEDS BY ELIG CLIN: HCPCS | Performed by: ORTHOPAEDIC SURGERY

## 2024-09-27 PROCEDURE — 20610 DRAIN/INJ JOINT/BURSA W/O US: CPT | Performed by: ORTHOPAEDIC SURGERY

## 2024-09-27 RX ORDER — TRIAMCINOLONE ACETONIDE 40 MG/ML
40 INJECTION, SUSPENSION INTRA-ARTICULAR; INTRAMUSCULAR ONCE
Status: COMPLETED | OUTPATIENT
Start: 2024-09-27 | End: 2024-09-27

## 2024-09-27 RX ORDER — ROPIVACAINE HYDROCHLORIDE 5 MG/ML
4 INJECTION, SOLUTION EPIDURAL; INFILTRATION; PERINEURAL ONCE
Status: COMPLETED | OUTPATIENT
Start: 2024-09-27 | End: 2024-09-27

## 2024-09-27 RX ADMIN — TRIAMCINOLONE ACETONIDE 40 MG: 40 INJECTION, SUSPENSION INTRA-ARTICULAR; INTRAMUSCULAR at 10:40

## 2024-09-27 RX ADMIN — ROPIVACAINE HYDROCHLORIDE 4 ML: 5 INJECTION, SOLUTION EPIDURAL; INFILTRATION; PERINEURAL at 10:40

## 2024-10-04 ENCOUNTER — OFFICE VISIT (OUTPATIENT)
Dept: ORTHOPEDIC SURGERY | Age: 69
End: 2024-10-04
Payer: MEDICARE

## 2024-10-04 VITALS — HEIGHT: 67 IN | BODY MASS INDEX: 38.45 KG/M2 | WEIGHT: 245 LBS

## 2024-10-04 DIAGNOSIS — M50.30 DDD (DEGENERATIVE DISC DISEASE), CERVICAL: ICD-10-CM

## 2024-10-04 DIAGNOSIS — M54.2 CERVICAL PAIN: Primary | ICD-10-CM

## 2024-10-04 DIAGNOSIS — M47.22 CERVICAL SPONDYLOSIS WITH RADICULOPATHY: ICD-10-CM

## 2024-10-04 DIAGNOSIS — M48.02 CERVICAL SPINAL STENOSIS: ICD-10-CM

## 2024-10-04 PROCEDURE — G8484 FLU IMMUNIZE NO ADMIN: HCPCS | Performed by: PHYSICIAN ASSISTANT

## 2024-10-04 PROCEDURE — G8417 CALC BMI ABV UP PARAM F/U: HCPCS | Performed by: PHYSICIAN ASSISTANT

## 2024-10-04 PROCEDURE — G8427 DOCREV CUR MEDS BY ELIG CLIN: HCPCS | Performed by: PHYSICIAN ASSISTANT

## 2024-10-04 PROCEDURE — 3017F COLORECTAL CA SCREEN DOC REV: CPT | Performed by: PHYSICIAN ASSISTANT

## 2024-10-04 PROCEDURE — 99214 OFFICE O/P EST MOD 30 MIN: CPT | Performed by: PHYSICIAN ASSISTANT

## 2024-10-04 PROCEDURE — 1123F ACP DISCUSS/DSCN MKR DOCD: CPT | Performed by: PHYSICIAN ASSISTANT

## 2024-10-04 PROCEDURE — 1036F TOBACCO NON-USER: CPT | Performed by: PHYSICIAN ASSISTANT

## 2024-10-04 NOTE — PROGRESS NOTES
REVIEW OF SYSTEMS: Full ROS noted & scanned   CONSTITUTIONAL: Denies unexplained weight loss, fevers, chills or fatigue  NEUROLOGICAL: Denies unsteady gait or progressive weakness  MUSCULOSKELETAL: Denies joint swelling or redness  PSYCHOLOGICAL: Patient has a history of anxiety, depression  SKIN: Denies skin changes, delayed healing, rash, itching   HEMATOLOGIC: Denies easy bleeding or bruising  ENDOCRINE: Denies excessive thirst, urination, heat/cold  RESPIRATORY: Denies current dyspnea, cough  GI: Denies nausea, vomiting, diarrhea   : Denies bowel or bladder issues       PHYSICAL EXAM:    Vitals: Height 1.702 m (5' 7.01\"), weight 111.1 kg (245 lb).    GENERAL EXAM:  General Apparence: Patient is adequately groomed with no evidence of malnutrition.  Orientation: The patient is oriented to time, place and person.   Mood & Affect:The patient's mood and affect are appropriate   Vascular: Examination reveals no swelling tenderness in upper or lower extremities. Good capillary refill  Lymphatic: The lymphatic examination bilaterally reveals all areas to be without enlargement or induration  Sensation: Sensation is intact without deficit  Coordination/Balance: Good coordination.     CERVICAL EXAMINATION: Patient examined in wheelchair  Inspection: Local inspection shows no step-off or bruising.  Cervical alignment is normal.     Palpation:Diffuse tenderness at the midline, and trapezius.  Paraspinal tenderness is present. There is no step-off or paraspinal spasm.   Range of Motion: Cervical flexion, extension, and rotation are  reduced with pain.  Strength: 4/5 bilateral upper extremities   Special Tests:     Spurling's negative & Mccracken's negative bilaterally.    Cubital and Carpal tunnel Tinel's negative bilaterally.      Skin:There are no rashes, ulcerations or lesions in right & left upper extremities.  Reflexes: Bilaterally triceps, biceps and brachioradialis are 2+.  Clonus absent bilaterally at the feet.

## 2024-10-11 ENCOUNTER — TELEPHONE (OUTPATIENT)
Dept: ORTHOPEDIC SURGERY | Age: 69
End: 2024-10-11

## 2024-10-11 ENCOUNTER — OFFICE VISIT (OUTPATIENT)
Dept: ORTHOPEDIC SURGERY | Age: 69
End: 2024-10-11

## 2024-10-11 VITALS — HEIGHT: 67 IN | BODY MASS INDEX: 38.45 KG/M2 | WEIGHT: 245 LBS

## 2024-10-11 DIAGNOSIS — M17.12 PRIMARY OSTEOARTHRITIS OF LEFT KNEE: Primary | ICD-10-CM

## 2024-10-11 RX ORDER — TRIAMCINOLONE ACETONIDE 40 MG/ML
40 INJECTION, SUSPENSION INTRA-ARTICULAR; INTRAMUSCULAR ONCE
Status: COMPLETED | OUTPATIENT
Start: 2024-10-11 | End: 2024-10-11

## 2024-10-11 RX ORDER — ROPIVACAINE HYDROCHLORIDE 5 MG/ML
4 INJECTION, SOLUTION EPIDURAL; INFILTRATION; PERINEURAL ONCE
Status: COMPLETED | OUTPATIENT
Start: 2024-10-11 | End: 2024-10-11

## 2024-10-11 RX ADMIN — ROPIVACAINE HYDROCHLORIDE 4 ML: 5 INJECTION, SOLUTION EPIDURAL; INFILTRATION; PERINEURAL at 08:36

## 2024-10-11 RX ADMIN — TRIAMCINOLONE ACETONIDE 40 MG: 40 INJECTION, SUSPENSION INTRA-ARTICULAR; INTRAMUSCULAR at 08:35

## 2024-10-11 NOTE — PROGRESS NOTES
was had detailing the potential increased risk of infection and potential increase in FSBG and to monitor FSBG and adjust medications as needed.  -After sterile prep of the left knee, a 5cc corticosteroid injection (4cc ropivicaine and 1cc kenolog 40) was administered into the left knee intra-articular space.  The patient tolerated the procedure well and started to have immediate improvement in pain/symptoms.  -Continue activity modifications to include assistive device as needed  -All questions answered to the patient's satisfaction and the patient expressed understanding and agreement with the above listed treatment plan  -Follow up in 3 to 4 months or sooner should symptoms require  -Thank you for the clinical consultation and allowing me to participate in the patient's care.      Electronically signed by Neymar Riggins MD on 10/11/24 at 8:38 AM EDT         Neymar Riggins MD       Orthopaedic Surgery-Sports Medicine    Disclaimer:  This note was dictated with voice recognition software.  Though review and correction are routinely performed, please contact the office/medical records for any errors requiring correction.

## 2024-10-11 NOTE — TELEPHONE ENCOUNTER
Other    ERUM FROM [facility] CALLING TO REQUEST A AFTER VISIT SUMMARY FOR THE FOLLOW VISIT ON 10/11/24    PLEASE FAX : [phone]    PLEASE [phone] NURSE 200 VALE ASK FOR ERUM

## 2024-10-11 NOTE — TELEPHONE ENCOUNTER
Spoke with Lima at [facility] to clarify what is needed. AVS has been faxed to the number provided

## 2025-01-08 ENCOUNTER — OFFICE VISIT (OUTPATIENT)
Dept: NEUROLOGY | Age: 70
End: 2025-01-08

## 2025-01-08 VITALS
HEIGHT: 67 IN | DIASTOLIC BLOOD PRESSURE: 60 MMHG | SYSTOLIC BLOOD PRESSURE: 108 MMHG | HEART RATE: 58 BPM | WEIGHT: 198 LBS | OXYGEN SATURATION: 97 % | BODY MASS INDEX: 31.08 KG/M2

## 2025-01-08 DIAGNOSIS — M54.81 BILATERAL OCCIPITAL NEURALGIA: Primary | ICD-10-CM

## 2025-01-08 RX ORDER — DEXAMETHASONE SODIUM PHOSPHATE 10 MG/ML
10 INJECTION INTRAMUSCULAR; INTRAVENOUS ONCE
Status: COMPLETED | OUTPATIENT
Start: 2025-01-08 | End: 2025-01-08

## 2025-01-08 RX ORDER — LIDOCAINE HYDROCHLORIDE 20 MG/ML
5 INJECTION, SOLUTION INFILTRATION; PERINEURAL ONCE
Status: COMPLETED | OUTPATIENT
Start: 2025-01-08 | End: 2025-01-08

## 2025-01-08 RX ADMIN — DEXAMETHASONE SODIUM PHOSPHATE 10 MG: 10 INJECTION INTRAMUSCULAR; INTRAVENOUS at 11:57

## 2025-01-08 RX ADMIN — LIDOCAINE HYDROCHLORIDE 5 ML: 20 INJECTION, SOLUTION INFILTRATION; PERINEURAL at 11:58

## 2025-01-08 NOTE — PROGRESS NOTES
Neurology outpatient new visit    Patient name: Shay Townsend      Chief Complaint:  Refractory headache.    History of present illness:  This is a 69 years old right-handed male.  The patient is here for evaluation of refractory headache.  The onset was about more than 5 years ago.  The course is refractory.  The patient describes headache as bilateral occipital headache without nauseous, phonophobia or photophobia.  The headache also radiates to his neck and behind his eyes occasionally.  The patient denies significant radicular pain to both arms.  The patient has no history of migraine headache.    Past medical history:    Past Medical History:   Diagnosis Date    Anemia     Angina     Anxiety     Arthritis     Atrial fibrillation (HCC)     BPH (benign prostatic hyperplasia)     CAD (coronary artery disease)     CHF (congestive heart failure) ([facility])     Chronic pain     CKD (chronic kidney disease) stage 3, GFR 30-59 ml/min ([facility])     Clostridium difficile diarrhea 3/16/12; 2/29/12    positive stool toxin    Depression     Diabetes mellitus ([facility])     Diabetic neuropathy ([facility])     feet and legs    Disease of blood and blood forming organ     Dizziness     When he moves his head/ loses his balance    ESRD (end stage renal disease) ([facility])     GERD (gastroesophageal reflux disease)     gastric ulcer    Headache     Hearing loss     Hemodialysis patient ([facility])     Hyperlipidemia     Hypertension     IBS (irritable bowel syndrome)     Kidney stone 2002    Refusal of blood transfusions as patient is [employer]ism     Sleep apnea     SOB (shortness of breath)     chronic    Tinnitus     Venous ulcer ([facility])     LLE    Wound, open 01/13/2012       Past surgical history:    Past Surgical History:   Procedure Laterality Date    CARDIAC SURGERY      Dec 27 2010, triple bypass    CHOLECYSTECTOMY  09/2011    CORONARY ARTERY BYPASS GRAFT  2011    HERNIA REPAIR  age1    IR TUNNELED CVC PLACE WO SQ PORT/PUMP > 5 YEARS  05/21/2024

## 2025-01-08 NOTE — PROGRESS NOTES
Occipital nerve block    Indication:    Occipital neuralgia    Technique:    Identify landmarks and ailyn the injection site as the diagram below  Move hair from area (e.g. with assistant or lubricating jelly can be applied)  Clean the area (e.g. hibiclens, betadine, or Alcohol)    Preparation:    Syringe: 5 cc  Needle: 27 gauge 1.25\"  1% Lidocaine 3 cc  Dexamethasone 10 mg or 1 cc    Injection:    Insert the needle from inferior approach  Angle approximately 30-45 degrees and insert until striking periosteum  Aspirate for blood and if found, withdraw and redirect needle (to prevent intravascular injection)  Inject a total of 2-3 cc of medication at site, distributing in a fan-shaped distribution, JONN technique

## 2025-01-08 NOTE — PATIENT INSTRUCTIONS

## 2025-01-17 ENCOUNTER — OFFICE VISIT (OUTPATIENT)
Dept: ORTHOPEDIC SURGERY | Age: 70
End: 2025-01-17
Payer: MEDICARE

## 2025-01-17 VITALS — HEIGHT: 67 IN | BODY MASS INDEX: 31.08 KG/M2 | WEIGHT: 198 LBS

## 2025-01-17 DIAGNOSIS — M17.11 PRIMARY OSTEOARTHRITIS OF RIGHT KNEE: Primary | ICD-10-CM

## 2025-01-17 PROCEDURE — 3017F COLORECTAL CA SCREEN DOC REV: CPT | Performed by: ORTHOPAEDIC SURGERY

## 2025-01-17 PROCEDURE — 99213 OFFICE O/P EST LOW 20 MIN: CPT | Performed by: ORTHOPAEDIC SURGERY

## 2025-01-17 PROCEDURE — 1036F TOBACCO NON-USER: CPT | Performed by: ORTHOPAEDIC SURGERY

## 2025-01-17 PROCEDURE — 1123F ACP DISCUSS/DSCN MKR DOCD: CPT | Performed by: ORTHOPAEDIC SURGERY

## 2025-01-17 PROCEDURE — G8417 CALC BMI ABV UP PARAM F/U: HCPCS | Performed by: ORTHOPAEDIC SURGERY

## 2025-01-17 PROCEDURE — 1159F MED LIST DOCD IN RCRD: CPT | Performed by: ORTHOPAEDIC SURGERY

## 2025-01-17 PROCEDURE — G8427 DOCREV CUR MEDS BY ELIG CLIN: HCPCS | Performed by: ORTHOPAEDIC SURGERY

## 2025-01-17 NOTE — PROGRESS NOTES
FOLLOW UP ORTHOPAEDIC NOTE    The patient follows up today for reevaluation of right knee. The patient states 9/10 pain.  Patient states previous corticosteroid injection in September 2024 provided only 1 weeks worth of symptom relief albeit for 70% of pain relief.  He states that he feels the majority of his discomfort with dialysis when he is sitting for 3-1/2 hours.  He does enjoy using a recumbent bike.  He states that he has to wait for his insurance to occasionally turn over with regard to additional visits.     PE:  AAOx3  RR  Unlabored breathing  Skin warm and moist  Focused physical examination of the right knee  No erythema or effusion.  Unchanged exam     Diagnosis Orders   1. Primary osteoarthritis of right knee          Assessment and plan: 69 male with continued subjective symptoms of right knee pain with known, correlating diagnosis of right knee osteoarthritis.  -Time of 12 minutes was spent coordinating and discussing the clinical findings and diagnostic imaging results as they pertain to the patient's presenting subjective symptoms.  - I had a pleasant discussion with the patient today.  I reviewed with him that currently I did not advocate for any additional corticosteroid injections as he has not had enough symptomatic relief duration wise after the injection last time.  -I do think we need to work around his insurance constraints and a note was written today stating that his therapy facility/care facility should space out the number of allowable physical therapy visits so that they do not work through them too quickly and then he does not have therapy  -Note was also provided at encouraging a recumbent bike and low impact activities to include stationary bike as long as he can safely get on and off  -I also recommended and a note was provided stating that he has a majority of his pain at dialysis and if the technician or transport team could help arrange his knees by way of range of motion

## 2025-02-11 ENCOUNTER — TELEPHONE (OUTPATIENT)
Age: 70
End: 2025-02-11

## 2025-02-11 NOTE — TELEPHONE ENCOUNTER
Julita Isabel called to say pt had passed on 2/10/25. Pt appt with Dr. Escobar on 3/19/25 cancelled.

## (undated) DEVICE — ALCOHOL RUBBING 16OZ 70% ISO

## (undated) DEVICE — UNIVERSAL BLOCK TRAY: Brand: MEDLINE INDUSTRIES, INC.

## (undated) DEVICE — GAUZE,SPONGE,4"X4",16PLY,STRL,LF,10/TRAY: Brand: MEDLINE

## (undated) DEVICE — STERILE POLYISOPRENE POWDER-FREE SURGICAL GLOVES: Brand: PROTEXIS

## (undated) DEVICE — APPLICATOR PREP 26ML 0.7% IOD POVACRYLEX 74% ISO ALC ST

## (undated) DEVICE — GLOVE ORTHO 8   MSG9480

## (undated) DEVICE — TOWEL OR BLUEE 16X26IN ST 8 PACK ORB08 16X26ORTWL